# Patient Record
Sex: FEMALE | Race: ASIAN | NOT HISPANIC OR LATINO | URBAN - METROPOLITAN AREA
[De-identification: names, ages, dates, MRNs, and addresses within clinical notes are randomized per-mention and may not be internally consistent; named-entity substitution may affect disease eponyms.]

---

## 2017-09-25 ENCOUNTER — INPATIENT (INPATIENT)
Facility: HOSPITAL | Age: 47
LOS: 7 days | Discharge: HOME | End: 2017-10-03
Attending: HOSPITALIST | Admitting: HOSPITALIST
Payer: MEDICAID

## 2017-09-25 VITALS
HEART RATE: 98 BPM | TEMPERATURE: 98 F | RESPIRATION RATE: 16 BRPM | DIASTOLIC BLOOD PRESSURE: 70 MMHG | OXYGEN SATURATION: 100 % | SYSTOLIC BLOOD PRESSURE: 142 MMHG

## 2017-09-25 NOTE — ED ADULT NURSE NOTE - OBJECTIVE STATEMENT
(facilitating) Pt rec'd to R#26 Aox4, in NAD, sent in by PMD for elevated calcium, BUN, creat levels in BW. Pt c/o B/L lower extremity pain x3 days, denies fall/ recent injury to legs. States it is difficult to ambulate d/t pain. Also c/o nausea last night, subjective fevers x2.5 weeks, lower back pain (states she has known B/L kidney stones), intermittent abdominal pain. Denies CP/ SOB/ cough/ urinary symptoms/ weakness/ dizziness/ other acute complaints. VSS> IV inserted, BW collected and sent to lab. Awaiting MD [place]. Family at bedside. Report endorsed to Isaura MORATAYA.

## 2017-09-25 NOTE — ED ADULT TRIAGE NOTE - CHIEF COMPLAINT QUOTE
pt sent in by PMD for elevated Cal[place] and kidney levels. pt also c/o abdominal pain, nausea, dizziness.

## 2017-09-26 DIAGNOSIS — E11.9 TYPE 2 DIABETES MELLITUS WITHOUT COMPLICATIONS: ICD-10-CM

## 2017-09-26 DIAGNOSIS — Z29.9 ENCOUNTER FOR PROPHYLACTIC MEASURES, UNSPECIFIED: ICD-10-CM

## 2017-09-26 DIAGNOSIS — D64.9 ANEMIA, UNSPECIFIED: ICD-10-CM

## 2017-09-26 DIAGNOSIS — E87.5 HYPERKALEMIA: ICD-10-CM

## 2017-09-26 DIAGNOSIS — D86.9 SARCOIDOSIS, UNSPECIFIED: ICD-10-CM

## 2017-09-26 DIAGNOSIS — E83.52 HYPERCALCEMIA: ICD-10-CM

## 2017-09-26 DIAGNOSIS — D72.1 EOSINOPHILIA: ICD-10-CM

## 2017-09-26 DIAGNOSIS — N17.9 ACUTE KIDNEY FAILURE, UNSPECIFIED: ICD-10-CM

## 2017-09-26 LAB
24R-OH-CALCIDIOL SERPL-MCNC: 12.3 NG/ML — LOW (ref 30–80)
ALBUMIN SERPL ELPH-MCNC: 3.5 G/DL — SIGNIFICANT CHANGE UP (ref 3.3–5)
ALBUMIN SERPL ELPH-MCNC: 4.1 G/DL — SIGNIFICANT CHANGE UP (ref 3.3–5)
ALP SERPL-CCNC: 47 U/L — SIGNIFICANT CHANGE UP (ref 40–120)
ALP SERPL-CCNC: 47 U/L — SIGNIFICANT CHANGE UP (ref 40–120)
ALT FLD-CCNC: 4 U/L — SIGNIFICANT CHANGE UP (ref 4–33)
ALT FLD-CCNC: 4 U/L — SIGNIFICANT CHANGE UP (ref 4–33)
APPEARANCE UR: CLEAR — SIGNIFICANT CHANGE UP
AST SERPL-CCNC: 11 U/L — SIGNIFICANT CHANGE UP (ref 4–32)
AST SERPL-CCNC: 13 U/L — SIGNIFICANT CHANGE UP (ref 4–32)
BASOPHILS # BLD AUTO: 0.02 K/UL — SIGNIFICANT CHANGE UP (ref 0–0.2)
BASOPHILS # BLD AUTO: 0.02 K/UL — SIGNIFICANT CHANGE UP (ref 0–0.2)
BASOPHILS NFR BLD AUTO: 0.4 % — SIGNIFICANT CHANGE UP (ref 0–2)
BASOPHILS NFR BLD AUTO: 0.5 % — SIGNIFICANT CHANGE UP (ref 0–2)
BILIRUB SERPL-MCNC: 0.2 MG/DL — SIGNIFICANT CHANGE UP (ref 0.2–1.2)
BILIRUB SERPL-MCNC: 0.3 MG/DL — SIGNIFICANT CHANGE UP (ref 0.2–1.2)
BILIRUB UR-MCNC: NEGATIVE — SIGNIFICANT CHANGE UP
BLOOD UR QL VISUAL: NEGATIVE — SIGNIFICANT CHANGE UP
BUN SERPL-MCNC: 73 MG/DL — HIGH (ref 7–23)
BUN SERPL-MCNC: 77 MG/DL — HIGH (ref 7–23)
CALCIUM SERPL-MCNC: 12.8 MG/DL — HIGH (ref 8.4–10.5)
CALCIUM SERPL-MCNC: 15.3 MG/DL — CRITICAL HIGH (ref 8.4–10.5)
CHLORIDE SERPL-SCNC: 94 MMOL/L — LOW (ref 98–107)
CHLORIDE SERPL-SCNC: 99 MMOL/L — SIGNIFICANT CHANGE UP (ref 98–107)
CO2 SERPL-SCNC: 20 MMOL/L — LOW (ref 22–31)
CO2 SERPL-SCNC: 22 MMOL/L — SIGNIFICANT CHANGE UP (ref 22–31)
COLOR SPEC: SIGNIFICANT CHANGE UP
CREAT ?TM UR-MCNC: 35.32 MG/DL — SIGNIFICANT CHANGE UP
CREAT SERPL-MCNC: 5.21 MG/DL — HIGH (ref 0.5–1.3)
CREAT SERPL-MCNC: 5.65 MG/DL — HIGH (ref 0.5–1.3)
EOSINOPHIL # BLD AUTO: 0.5 K/UL — SIGNIFICANT CHANGE UP (ref 0–0.5)
EOSINOPHIL # BLD AUTO: 0.64 K/UL — HIGH (ref 0–0.5)
EOSINOPHIL NFR BLD AUTO: 11.5 % — HIGH (ref 0–6)
EOSINOPHIL NFR BLD AUTO: 12.2 % — HIGH (ref 0–6)
GLUCOSE SERPL-MCNC: 133 MG/DL — HIGH (ref 70–99)
GLUCOSE SERPL-MCNC: 86 MG/DL — SIGNIFICANT CHANGE UP (ref 70–99)
GLUCOSE UR-MCNC: 50 — SIGNIFICANT CHANGE UP
HBA1C BLD-MCNC: 6.9 % — HIGH (ref 4–5.6)
HCG UR-SCNC: NEGATIVE — SIGNIFICANT CHANGE UP
HCT VFR BLD CALC: 26.5 % — LOW (ref 34.5–45)
HCT VFR BLD CALC: 29.5 % — LOW (ref 34.5–45)
HGB BLD-MCNC: 8.6 G/DL — LOW (ref 11.5–15.5)
HGB BLD-MCNC: 9.7 G/DL — LOW (ref 11.5–15.5)
IMM GRANULOCYTES # BLD AUTO: 0.02 # — SIGNIFICANT CHANGE UP
IMM GRANULOCYTES # BLD AUTO: 0.03 # — SIGNIFICANT CHANGE UP
IMM GRANULOCYTES NFR BLD AUTO: 0.4 % — SIGNIFICANT CHANGE UP (ref 0–1.5)
IMM GRANULOCYTES NFR BLD AUTO: 0.7 % — SIGNIFICANT CHANGE UP (ref 0–1.5)
KETONES UR-MCNC: NEGATIVE — SIGNIFICANT CHANGE UP
LEUKOCYTE ESTERASE UR-ACNC: NEGATIVE — SIGNIFICANT CHANGE UP
LYMPHOCYTES # BLD AUTO: 0.69 K/UL — LOW (ref 1–3.3)
LYMPHOCYTES # BLD AUTO: 1.03 K/UL — SIGNIFICANT CHANGE UP (ref 1–3.3)
LYMPHOCYTES # BLD AUTO: 15.9 % — SIGNIFICANT CHANGE UP (ref 13–44)
LYMPHOCYTES # BLD AUTO: 19.6 % — SIGNIFICANT CHANGE UP (ref 13–44)
MCHC RBC-ENTMCNC: 25.4 PG — LOW (ref 27–34)
MCHC RBC-ENTMCNC: 26.1 PG — LOW (ref 27–34)
MCHC RBC-ENTMCNC: 32.5 % — SIGNIFICANT CHANGE UP (ref 32–36)
MCHC RBC-ENTMCNC: 32.9 % — SIGNIFICANT CHANGE UP (ref 32–36)
MCV RBC AUTO: 78.4 FL — LOW (ref 80–100)
MCV RBC AUTO: 79.3 FL — LOW (ref 80–100)
MONOCYTES # BLD AUTO: 0.55 K/UL — SIGNIFICANT CHANGE UP (ref 0–0.9)
MONOCYTES # BLD AUTO: 0.6 K/UL — SIGNIFICANT CHANGE UP (ref 0–0.9)
MONOCYTES NFR BLD AUTO: 11.4 % — SIGNIFICANT CHANGE UP (ref 2–14)
MONOCYTES NFR BLD AUTO: 12.7 % — SIGNIFICANT CHANGE UP (ref 2–14)
MUCOUS THREADS # UR AUTO: SIGNIFICANT CHANGE UP
NEUTROPHILS # BLD AUTO: 2.54 K/UL — SIGNIFICANT CHANGE UP (ref 1.8–7.4)
NEUTROPHILS # BLD AUTO: 2.94 K/UL — SIGNIFICANT CHANGE UP (ref 1.8–7.4)
NEUTROPHILS NFR BLD AUTO: 56 % — SIGNIFICANT CHANGE UP (ref 43–77)
NEUTROPHILS NFR BLD AUTO: 58.7 % — SIGNIFICANT CHANGE UP (ref 43–77)
NITRITE UR-MCNC: NEGATIVE — SIGNIFICANT CHANGE UP
NRBC # FLD: 0 — SIGNIFICANT CHANGE UP
NRBC # FLD: 0 — SIGNIFICANT CHANGE UP
PH UR: 6.5 — SIGNIFICANT CHANGE UP (ref 4.6–8)
PLATELET # BLD AUTO: 195 K/UL — SIGNIFICANT CHANGE UP (ref 150–400)
PLATELET # BLD AUTO: 216 K/UL — SIGNIFICANT CHANGE UP (ref 150–400)
PMV BLD: 8.9 FL — SIGNIFICANT CHANGE UP (ref 7–13)
PMV BLD: 9.3 FL — SIGNIFICANT CHANGE UP (ref 7–13)
POTASSIUM SERPL-MCNC: 5.2 MMOL/L — SIGNIFICANT CHANGE UP (ref 3.5–5.3)
POTASSIUM SERPL-MCNC: 5.5 MMOL/L — HIGH (ref 3.5–5.3)
POTASSIUM SERPL-SCNC: 5.2 MMOL/L — SIGNIFICANT CHANGE UP (ref 3.5–5.3)
POTASSIUM SERPL-SCNC: 5.5 MMOL/L — HIGH (ref 3.5–5.3)
PROT SERPL-MCNC: 6.9 G/DL — SIGNIFICANT CHANGE UP (ref 6–8.3)
PROT SERPL-MCNC: 7.3 G/DL — SIGNIFICANT CHANGE UP (ref 6–8.3)
PROT SERPL-MCNC: 8.5 G/DL — HIGH (ref 6–8.3)
PROT UR-MCNC: 22.7 MG/DL — SIGNIFICANT CHANGE UP
PROT UR-MCNC: 30 — HIGH
PTH-INTACT SERPL-MCNC: 8.12 PG/ML — LOW (ref 15–65)
RBC # BLD: 3.38 M/UL — LOW (ref 3.8–5.2)
RBC # BLD: 3.72 M/UL — LOW (ref 3.8–5.2)
RBC # FLD: 12.6 % — SIGNIFICANT CHANGE UP (ref 10.3–14.5)
RBC # FLD: 12.7 % — SIGNIFICANT CHANGE UP (ref 10.3–14.5)
RBC CASTS # UR COMP ASSIST: SIGNIFICANT CHANGE UP (ref 0–?)
SODIUM SERPL-SCNC: 135 MMOL/L — SIGNIFICANT CHANGE UP (ref 135–145)
SODIUM SERPL-SCNC: 137 MMOL/L — SIGNIFICANT CHANGE UP (ref 135–145)
SODIUM UR-SCNC: 102 MEQ/L — SIGNIFICANT CHANGE UP
SP GR SPEC: 1.01 — SIGNIFICANT CHANGE UP (ref 1–1.03)
SP GR UR: 1 — LOW (ref 1–1.03)
SQUAMOUS # UR AUTO: SIGNIFICANT CHANGE UP
UROBILINOGEN FLD QL: NORMAL E.U. — SIGNIFICANT CHANGE UP (ref 0.1–0.2)
VIT D25+D1,25 OH+D1,25 PNL SERPL-MCNC: 501 PG/ML — HIGH (ref 19.9–79.3)
WBC # BLD: 4.33 K/UL — SIGNIFICANT CHANGE UP (ref 3.8–10.5)
WBC # BLD: 5.25 K/UL — SIGNIFICANT CHANGE UP (ref 3.8–10.5)
WBC # FLD AUTO: 4.33 K/UL — SIGNIFICANT CHANGE UP (ref 3.8–10.5)
WBC # FLD AUTO: 5.25 K/UL — SIGNIFICANT CHANGE UP (ref 3.8–10.5)
WBC UR QL: SIGNIFICANT CHANGE UP (ref 0–?)

## 2017-09-26 PROCEDURE — 71250 CT THORAX DX C-: CPT | Mod: 26

## 2017-09-26 PROCEDURE — 99223 1ST HOSP IP/OBS HIGH 75: CPT | Mod: GC

## 2017-09-26 PROCEDURE — 84165 PROTEIN E-PHORESIS SERUM: CPT | Mod: 26

## 2017-09-26 PROCEDURE — 99223 1ST HOSP IP/OBS HIGH 75: CPT | Mod: AI,GC

## 2017-09-26 PROCEDURE — 84166 PROTEIN E-PHORESIS/URINE/CSF: CPT | Mod: 26

## 2017-09-26 PROCEDURE — 76775 US EXAM ABDO BACK WALL LIM: CPT | Mod: 26

## 2017-09-26 PROCEDURE — 86334 IMMUNOFIX E-PHORESIS SERUM: CPT | Mod: 26

## 2017-09-26 PROCEDURE — 86335 IMMUNFIX E-PHORSIS/URINE/CSF: CPT | Mod: 26

## 2017-09-26 RX ORDER — INFLUENZA VIRUS VACCINE 15; 15; 15; 15 UG/.5ML; UG/.5ML; UG/.5ML; UG/.5ML
0.5 SUSPENSION INTRAMUSCULAR ONCE
Qty: 0 | Refills: 0 | Status: DISCONTINUED | OUTPATIENT
Start: 2017-09-26 | End: 2017-10-03

## 2017-09-26 RX ORDER — DEXTROSE 50 % IN WATER 50 %
12.5 SYRINGE (ML) INTRAVENOUS ONCE
Qty: 0 | Refills: 0 | Status: DISCONTINUED | OUTPATIENT
Start: 2017-09-26 | End: 2017-10-03

## 2017-09-26 RX ORDER — SODIUM CHLORIDE 9 MG/ML
1000 INJECTION, SOLUTION INTRAVENOUS
Qty: 0 | Refills: 0 | Status: DISCONTINUED | OUTPATIENT
Start: 2017-09-26 | End: 2017-10-03

## 2017-09-26 RX ORDER — SODIUM POLYSTYRENE SULFONATE 4.1 MEQ/G
30 POWDER, FOR SUSPENSION ORAL ONCE
Qty: 0 | Refills: 0 | Status: DISCONTINUED | OUTPATIENT
Start: 2017-09-26 | End: 2017-09-26

## 2017-09-26 RX ORDER — SODIUM CHLORIDE 9 MG/ML
1000 INJECTION INTRAMUSCULAR; INTRAVENOUS; SUBCUTANEOUS
Qty: 0 | Refills: 0 | Status: DISCONTINUED | OUTPATIENT
Start: 2017-09-26 | End: 2017-09-29

## 2017-09-26 RX ORDER — DEXTROSE 50 % IN WATER 50 %
1 SYRINGE (ML) INTRAVENOUS ONCE
Qty: 0 | Refills: 0 | Status: DISCONTINUED | OUTPATIENT
Start: 2017-09-26 | End: 2017-10-03

## 2017-09-26 RX ORDER — OXYCODONE AND ACETAMINOPHEN 5; 325 MG/1; MG/1
1 TABLET ORAL EVERY 6 HOURS
Qty: 0 | Refills: 0 | Status: DISCONTINUED | OUTPATIENT
Start: 2017-09-26 | End: 2017-10-03

## 2017-09-26 RX ORDER — INSULIN LISPRO 100/ML
VIAL (ML) SUBCUTANEOUS AT BEDTIME
Qty: 0 | Refills: 0 | Status: DISCONTINUED | OUTPATIENT
Start: 2017-09-26 | End: 2017-10-03

## 2017-09-26 RX ORDER — SODIUM POLYSTYRENE SULFONATE 4.1 MEQ/G
15 POWDER, FOR SUSPENSION ORAL ONCE
Qty: 0 | Refills: 0 | Status: COMPLETED | OUTPATIENT
Start: 2017-09-26 | End: 2017-09-26

## 2017-09-26 RX ORDER — INSULIN LISPRO 100/ML
VIAL (ML) SUBCUTANEOUS
Qty: 0 | Refills: 0 | Status: DISCONTINUED | OUTPATIENT
Start: 2017-09-26 | End: 2017-10-03

## 2017-09-26 RX ORDER — DEXTROSE 50 % IN WATER 50 %
25 SYRINGE (ML) INTRAVENOUS ONCE
Qty: 0 | Refills: 0 | Status: DISCONTINUED | OUTPATIENT
Start: 2017-09-26 | End: 2017-10-03

## 2017-09-26 RX ORDER — SODIUM CHLORIDE 9 MG/ML
2000 INJECTION INTRAMUSCULAR; INTRAVENOUS; SUBCUTANEOUS ONCE
Qty: 0 | Refills: 0 | Status: COMPLETED | OUTPATIENT
Start: 2017-09-26 | End: 2017-09-26

## 2017-09-26 RX ORDER — ACETAMINOPHEN 500 MG
1000 TABLET ORAL ONCE
Qty: 0 | Refills: 0 | Status: COMPLETED | OUTPATIENT
Start: 2017-09-26 | End: 2017-09-26

## 2017-09-26 RX ORDER — GLUCAGON INJECTION, SOLUTION 0.5 MG/.1ML
1 INJECTION, SOLUTION SUBCUTANEOUS ONCE
Qty: 0 | Refills: 0 | Status: DISCONTINUED | OUTPATIENT
Start: 2017-09-26 | End: 2017-10-03

## 2017-09-26 RX ADMIN — SODIUM CHLORIDE 2000 MILLILITER(S): 9 INJECTION INTRAMUSCULAR; INTRAVENOUS; SUBCUTANEOUS at 00:44

## 2017-09-26 RX ADMIN — SODIUM POLYSTYRENE SULFONATE 15 GRAM(S): 4.1 POWDER, FOR SUSPENSION ORAL at 16:29

## 2017-09-26 RX ADMIN — Medication 1000 MILLIGRAM(S): at 01:05

## 2017-09-26 RX ADMIN — Medication 400 MILLIGRAM(S): at 00:50

## 2017-09-26 RX ADMIN — SODIUM CHLORIDE 200 MILLILITER(S): 9 INJECTION INTRAMUSCULAR; INTRAVENOUS; SUBCUTANEOUS at 08:30

## 2017-09-26 RX ADMIN — OXYCODONE AND ACETAMINOPHEN 1 TABLET(S): 5; 325 TABLET ORAL at 17:40

## 2017-09-26 RX ADMIN — OXYCODONE AND ACETAMINOPHEN 1 TABLET(S): 5; 325 TABLET ORAL at 18:30

## 2017-09-26 NOTE — CONSULT NOTE ADULT - ASSESSMENT
46 y/o female with history of DM, sarcoidosis ~2010 ( been on and off prednisone) presented to [facility] with compliant of weakness, back pain and LE pain found to have Jose and hypercalcemia. 47-year-old female with known history of sarcoidosis, DM presented to [facility] with complaints of weakness, back pain and LE pain. Pt. found to have MIKALA and hypercalcemia.

## 2017-09-26 NOTE — H&P ADULT - NSHPLABSRESULTS_GEN_ALL_CORE
Normocytic anemia,Hypercalcemia,MIKALA,hyperkalemic,hyperglycemia    EKG:NSR,Normal axis,no acute ST or T wave changes. Normocytic anemia, Hypercalcemia, MIKALA, Hyperkalemic, Hyperglycemia    EKG: NSR, Normal axis, no acute ST or T wave changes.

## 2017-09-26 NOTE — H&P ADULT - PROBLEM SELECTOR PLAN 2
differential includes Jose 2/2 malignancy(myeloma) vs prerenal vs intrinsic renal   -Renal Sono  -SPEP/UPEP,Immunofixation  -Strict I/OS  -Nephrology consult   -Hold nephrotixins differential includes Jose 2/2 malignancy(myeloma) vs prerenal 2/2 hypercalcemia vs NSAID induced nephropathy  -Renal Sono  -SPEP/UPEP,Immunofixation,FLC  -Strict I/OS  -Nephrology consult   -Hold nephrotixins

## 2017-09-26 NOTE — ED PROVIDER NOTE - NS ED ROS FT
REVIEW OF SYSTEMS:    CONSTITUTIONAL: No weakness, fevers or chills  EYES/ENT: No visual changes;  No vertigo or throat pain   NECK: No pain or stiffness  RESPIRATORY: No cough, wheezing, hemoptysis; No shortness of breath  CARDIOVASCULAR: No chest pain or palpitations  GASTROINTESTINAL: No abdominal or epigastric pain. No nausea, vomiting, or hematemesis; No diarrhea or constipation. No melena or hematochezia.  GENITOURINARY: Urinary burning.  NEUROLOGICAL: bilateral LE pain  SKIN: No itching, burning, rashes, or lesions   All other review of systems is negative unless indicated above.

## 2017-09-26 NOTE — ED PROVIDER NOTE - OBJECTIVE STATEMENT
46yo F w/ PMHx of sarcoidosis present to ER from pulmonologist' s office after found to have MIKALA and hyperCa level. Patient reported feeling urinary burning s/p outpatient abx treatment as well as bilateral LE pain from feet up to knee level. Patient was found to have elevated Ca up to 15 and MIKALA cr 5.5 (baseline 1.6 Cr). 48yo F w/ PMHx of sarcoidosis present to ER from pulmonologist' s office after found to have MIKALA and hyperCa level. Patient reported feeling urinary burning s/p outpatient abx treatment as well as bilateral LE pain from feet up to knee level. Patient was found to have elevated Ca up to 15 and MIKALA cr 5.5 (baseline 1.6 Cr).  Kle[place]fish: 47F PMH sarcoid, DM p/w abnormal labs. Pt had routine labs drawn 3d ago, called yesterday for high ca and high cr. Pt c/o b/l LE pain, diffuse, minimal lower back pain. Denies SOB/CP, f/c, palpitations, lightheaded, NVD, weakness/numbness, abd pain, urinary complaints. PMD [facility], has no nephrologist. Baseline cr ~1.6 per pt.

## 2017-09-26 NOTE — H&P ADULT - PROBLEM SELECTOR PLAN 3
Normcytic/microcytic anemia. Differntial includes iron deficiency vs anemia of chronic disease. Low suspicion for hemolysis given normal Bilirubin and unchanged Hg level from prior examinations   -Check iron studies  -Monitor CBC daily

## 2017-09-26 NOTE — ED PROVIDER NOTE - MEDICAL DECISION MAKING DETAILS
47F w/ sarcoidosis present to ER from pulmonologist' s office for MIKALA and hypercalcemia. Started on 2L bolus in the ER. Will trend BMP.

## 2017-09-26 NOTE — CONSULT NOTE ADULT - PROBLEM SELECTOR RECOMMENDATION 9
PT with MIKALA likely hemodynamically mediated in setting of hypercalcemia and NSAID use. Pt with Scr 1.7 in August 2017. Pt with elevated Scr at 5.6 on 9/26 with improvement to 5.2 with IVF. No documentation of urine output , however patient reports she is voiding well.  Advise to continue IVF. Advise to check renal sonogram. Monitor BMP, strict I/O, avoid nephrotoxcis, NSAIDS, RCA Pt. with most likely hemodynamically mediated MIKALA in setting of hypercalcemia and recent NSAID use. Scr elevated at 5.6 on initial labs in ER today. Scr decreased to 5.2 with IVF. Patient reports voiding well. Continue with IVF as tolerated. Check renal sonogram. Monitor labs and urine output. Avoid nephrotoxins, NSAIDs, and RCA

## 2017-09-26 NOTE — ED PROVIDER NOTE - ATTENDING CONTRIBUTION TO CARE
47F PMH sarcoid, DM p/w high ca and increasing cr, c/o b/l LE pain x3-4d, minimal LBP. No neuro or cardiovascular complaints. Vitals wnl, exam as above.  ddx: Possibly 2/2 sarcoid or DM vs. underlying malignancy,  CBC, cmp, IVF, EKG, reassess.

## 2017-09-26 NOTE — H&P ADULT - PROBLEM SELECTOR PLAN 5
Possible 2/2 sarcoid vs vasculitis    - Possible 2/2 sarcoid vs vasculitis    -Will check ANCAs  -Repeat CBC with diff

## 2017-09-26 NOTE — CONSULT NOTE ADULT - ASSESSMENT
47yoF hx of sarcoidosis (dx 2010 via biopsy showing noncaseating granulomas not on any treatment), T2DM, sent in from outside rheumatologist for abnormal labs showing MIKALA and hypercalcemia. Pt on admission has Ca+ 15.3, creatinine 5.65. Rheum consulted for further evaluation    1) Hypercalcemia- differential includes sarcoidosis vs. malignancy such as MM. Pt has diagnosed sarcoid not on any treatment  HyperCa+ can be due to excess Vit D production by granulomas. However, she should also undergo w/u to rule out multiple myeloma  - Check Vit D 25-OH, 1-25OH, ACE level  - SPEP, UPEP  - f/u CT Chest   - Check Immunoglobulins, HIV, Quant Gold, ESR CRP    2) MIKALA- MIKALA may be 2/2 hypercalcemia or AIN 2/2 Aleve use. Pt also notes recent Abx use for UTI treatment  - Would check UA and Urine culture  - c/w IVF  - If no improvement, will consider steroids  - f/u renal sono    3) Anemia- pt has microcytic anemia, likely iron deficiency  - Check iron studies including LDH Hapto Retic    will follow 47yoF hx of sarcoidosis (dx 2010 via biopsy showing noncaseating granulomas not on any treatment), T2DM, sent in from outside rheumatologist for abnormal labs showing MIKALA and hypercalcemia. Pt on admission has Ca+ 15.3, creatinine 5.65. Rheum consulted for further evaluation    1) Hypercalcemia- differential includes sarcoidosis vs. malignancy such as MM/lymphoma. Pt has diagnosed sarcoid not on any treatment  HyperCa+ can be due to excess Vit D production by granulomas. However, she should also undergo w/u to rule out multiple myeloma  - Check Vit D 25-OH, 1-25OH, ACE level  - SPEP, UPEP  - f/u CT Chest   - Check Immunoglobulins, HIV, Quant Gold, ESR CRP    2) MIKALA- MIKALA may be 2/2 hypercalcemia or AIN 2/2 Aleve use. Pt also notes recent Abx use for UTI treatment  - Would check UA and Urine culture  - c/w IVF  - If no improvement, will consider steroids  - f/u renal sono    3) Anemia- pt has microcytic anemia, likely iron deficiency  - Check iron studies including LDH Hapto Retic    will follow 47yoF hx of sarcoidosis (dx 2010 via biopsy showing noncaseating granulomas not on any treatment), T2DM, sent in from outside rheumatologist for abnormal labs showing MIKALA and hypercalcemia. Pt on admission has Ca+ 15.3, creatinine 5.65. Rheum consulted for further evaluation    1) Hypercalcemia- differential includes sarcoidosis vs. malignancy such as MM/lymphoma. Pt has diagnosed sarcoid not on any treatment  HyperCa+ can be due to excess Vit D production by granulomas. However, she should also undergo w/u to rule out multiple myeloma  - Check Vit D 25-OH, 1-25OH, ACE level  - SPEP, UPEP  - f/u CT Chest   - Check Immunoglobulins, HIV, Quant Gold, ESR CRP    2) MIKALA- MIKALA may be 2/2 hypercalcemia or AIN 2/2 NSAID use. Pt also notes recent Abx use for UTI treatment  - Would check UA and Urine culture  - c/w IVF  - If no improvement, will consider steroids  - f/u renal sono    3) Anemia- pt has microcytic anemia, likely iron deficiency  - Check iron studies including LDH Hapto Retic  4) Progressive ENGLISH - concern for pulm vs CARDIAC sarcoid vs possible pericardial effusion 2/2 renal failure/uremia  will follow     will follow

## 2017-09-26 NOTE — H&P ADULT - ASSESSMENT
47 y.o F with pmh sarcoidosis (untreated for past 3 years),DMII and nephrolithiasis p/w abnormal labs from pulmonologists office. Pt found 47 y.o F with pmh sarcoidosis (untreated for past 3 years), DMII and nephrolithiasis, p/w high Ca and increasing Cr from pulmonologists office, c/o BLE pain x3-4d, LBP, dizziness and nausea. Vitals wnL, no cardiac complaints. Exam as above.

## 2017-09-26 NOTE — H&P ADULT - PROBLEM SELECTOR PLAN 6
-Pulm consult   -Obtain home meds   -Talk to outpatient PCP Pt has been off treatment for over 3 years because she has not followed up with her PCP.   -Rheum consult   -Obtain home meds   -Talk to outpatient PCP

## 2017-09-26 NOTE — CONSULT NOTE ADULT - PROBLEM SELECTOR RECOMMENDATION 3
Pt with hypercalcemia in setting of sarcoidosis. Pt with serum calcium 10.3 in August 2017. Pt with serum calcium 15.3 today. Pt was started on IVF with improvement in serum calcium to 12.8. Advise to continue IVF. Monitor serum calcium closely Pt. with hypercalcemia in setting of sarcoidosis. Serum calcium decreased to 12.8 with IVF. Continue IVF. Monitor serum calcium

## 2017-09-26 NOTE — CONSULT NOTE ADULT - SUBJECTIVE AND OBJECTIVE BOX
LEONEL SIRIA  [id number]    HISTORY OF PRESENT ILLNESS:  47yoF hx of sarcoidosis (dx 2010 via transbronchial bx), T2DM sent in from outside rheumatologist Dr. Carmichael for hypercalcemia and MIKALA.  Pt accompanied by daughter at bedside.  States that she has not felt like herself for almost 2 years but more recently has felt more fatigue, worsening ENGLISH, and weight loss. Has lost about 25 lbs in the last month. Gets short of breath even when walking about 1 block. Has noticed rash on her arms and has had hair loss, muscle weakness, and leg pains. +dry mouth x 3 days.   Says that she only takes Metformin and Aleve at home but over the weekend, she had taken about 8 Aleve/day for relief of her joint pains. Normally takes about 2 Aleve/day for pains   Also reports that she recently had a UTI and was treated with ciprofloxacin.    Says she was dx with sarcoidosis in 2010 after she had reported cough and imaging showed abnormalities in her lungs. Transbronchial bx of RLL showed noncaseating granulomas. She believes she was started on Prednisone but then stopped because she was feeling well.  Had previously seen a rheumatologist Dr. Sky but had not followed up. She was recently found to have an elevated ESR at her PMD's office and was referred to see Dr. Carmichael who did labs on her yesterday 9/25. He noted that her calcium had returned >15 and her creatinine was 3.7 (labs in 8/2017 showed creatinine 1.67, calcium 10.3) so she was advised to come to ED.     In ED, pt noted to have creatinine of 5.65, calcium of 15.3. She was started on NS 2L and admitted to Medicine.    PAST MEDICAL & SURGICAL HISTORY:  Diabetes  Sarcoidosis  No significant past surgical history      Review of Systems:  Gen: +fatigue +weight loss  HEENT: No blurry vision, no difficulty swallowing. No painful/red eyes +dry mouth  CVS: No chest pain/palpitations  Resp: +SOB +ENGLISH  GI: No N/V/C/D/abdominal pain  MSK:  +joint pains  Skin: +rashes  Neuro: No headaches    MEDICATIONS  (STANDING):  sodium chloride 0.9%. 1000 milliLiter(s) (200 mL/Hr) IV Continuous <Continuous>  insulin lispro (HumaLOG) corrective regimen sliding scale   SubCutaneous three times a day before meals  insulin lispro (HumaLOG) corrective regimen sliding scale   SubCutaneous at bedtime  dextrose 5%. 1000 milliLiter(s) (50 mL/Hr) IV Continuous <Continuous>  dextrose 50% Injectable 12.5 Gram(s) IV Push once  dextrose 50% Injectable 25 Gram(s) IV Push once  dextrose 50% Injectable 25 Gram(s) IV Push once  sodium polystyrene sulfonate Suspension 15 Gram(s) Oral once    MEDICATIONS  (PRN):  dextrose Gel 1 Dose(s) Oral once PRN Blood Glucose LESS THAN 70 milliGRAM(s)/deciliter  glucagon  Injectable 1 milliGRAM(s) IntraMuscular once PRN Glucose LESS THAN 70 milligrams/deciliter      Allergies    No Known Allergies    Intolerances        PERTINENT MEDICATION HISTORY:    SOCIAL HISTORY: Originally from [facility], lives with family. No toxic habits  OCCUPATION:  at [employer].       FAMILY HISTORY:  Family history of type C viral hepatitis (Mother): Mother      Vital Signs Last 24 Hrs  T(C): 37.1 (26 Sep 2017 13:16), Max: 37.1 (26 Sep 2017 08:30)  T(F): 98.7 (26 Sep 2017 13:16), Max: 98.7 (26 Sep 2017 08:30)  HR: 88 (26 Sep 2017 14:53) (80 - 98)  BP: 132/70 (26 Sep 2017 14:53) (130/70 - 160/82)  BP(mean): --  RR: 20 (26 Sep 2017 14:53) (16 - 20)  SpO2: 100% (26 Sep 2017 14:53) (100% - 100%)    Daily     Daily     Physical Exam:  General: No apparent distress  HEENT: EOMI, MMM. Impressive palpable preauricular LAD as well as anterior/posterior cervical adenopathy and submandibular and supraclavicular LAD  CVS: +S1/S2, RRR, no murmurs/rubs/gallops  Resp: CTA b/l. No crackles/wheezing  GI: Soft, NT/ND +BS  MSK:  Shoulders: wnl  Elbows: wnl  Wrists: wnl  MCPs: wnl  PIPs: wnl  DIPs: wnl   Hips: wnl  Knees: wnl   Ankle: wnl  Neuro: AAOx3  Skin: palpable erythematous nodule appreciated on RUE. Pt has postinflammatory hyperpigmentation noted on RLE from previous dimitry.     LABS:                        8.6    4.33  )-----------( 195      ( 26 Sep 2017 09:09 )             26.5     09-26    137  |  99  |  73<H>  ----------------------------<  86  5.5<H>   |  20<L>  |  5.21<H>    Ca    12.8<H>      26 Sep 2017 07:35    TPro  7.3  /  Alb  x   /  TBili  x   /  DBili  x   /  AST  x   /  ALT  x   /  AlkPhos  x   09-26          RADIOLOGY & ADDITIONAL STUDIES:

## 2017-09-26 NOTE — H&P ADULT - RS GEN PE MLT RESP DETAILS PC
good air movement/clear to auscultation bilaterally/normal/airway patent/breath sounds equal/respirations non-labored/no wheezes

## 2017-09-26 NOTE — H&P ADULT - NSHPSOCIALHISTORY_GEN_ALL_CORE
Non smoker,non drinker,no recreational drugs. Pt reports having 6 kids and works at [employer] Non smoker,non drinker,no recreational drugs. Pt is single. She lives at home with her 6 children and mother. She reports working at [employer]. Non-smoker, non-drinker, no recreational drugs. Pt is single. She lives at home with her 6 children and mother. She reports working at [employer].

## 2017-09-26 NOTE — CONSULT NOTE ADULT - PROBLEM SELECTOR RECOMMENDATION 2
PT with hyperkalemia in setting of renal failure. Pt with elevated serum potassium at 5.5 today. Advise one dose of Kayexalate. Advise to repeat serum potassium. Monitor serum potassium closely.  Low potassium diet Pt. with mild hyperkalemia in setting of renal failure. Serum potassium elevated at 5.5 today. Recommend one dose of Kayexalate. Repeat serum potassium today. Low potassium diet. Monitor serum potassium

## 2017-09-26 NOTE — H&P ADULT - FAMILY HISTORY
Mother  Still living? Yes, Estimated age: Age Unknown  Family history of type C viral hepatitis, Age at diagnosis: Age Unknown

## 2017-09-26 NOTE — ED PROVIDER NOTE - PHYSICAL EXAMINATION
T(C): 36.9 (09-25-17 @ 21:44), Max: 36.9 (09-25-17 @ 21:44)  HR: 92 (09-25-17 @ 23:40) (92 - 98)  BP: 160/82 (09-25-17 @ 23:40) (142/70 - 160/82)  RR: 16 (09-25-17 @ 23:40) (16 - 16)  SpO2: 100% (09-25-17 @ 23:40) (100% - 100%)  Wt(kg): --  GENERAL: NAD, well-developed  HEAD:  Atraumatic, Normocephalic  EYES: EOMI, PERRLA, conjunctiva and sclera clear  NECK: Supple, No JVD  CHEST/LUNG: Clear to auscultation bilaterally; No wheeze  HEART: Regular rate and rhythm; No murmurs, rubs, or gallops  ABDOMEN: Soft, Nontender, Nondistended; Bowel sounds present  EXTREMITIES:  2+ Peripheral Pulses, No clubbing, cyanosis, or edema  PSYCH: AAOx3  NEUROLOGY: non-focal  SKIN: No rashes or lesions no LE edema, normal equal distal pulses.   no calf ttp, strength 5/5

## 2017-09-26 NOTE — H&P ADULT - HISTORY OF PRESENT ILLNESS
47 y.o F with pmh DMII,Nephrolithiasis, Sarcoidosis presenting from pulmonologist office with abnormal labs. PT said that she went to visit her pulmonlogist 47 y.o F with pmh DMII,Nephrolithiasis, Sarcoidosis presenting from pulmonologist office with abnormal labs. PT said that she went to visit a new pulmonologist to establish care. She reports that for the past 3 months she has been having fevers/night sweats,20 pounds weight loss, fatigue, 47 y.o F with pmh DMII,Nephrolithiasis, Sarcoidosis presenting from pulmonologist office with abnormal labs. PT said that she went to visit a new pulmonologist to establish care. She reports that for the past 3 months she has been having fevers/night sweats,20 pounds weight loss, fatigue. Within the past week she has been experiencing worsening diffuse crampy intermittent  abdominal pain 10/10 in intensity not associated with diarrhea. She also ntoes crampy bi 47 y.o F with pmh DMII, Nephrolithiasis, Sarcoidosis presenting from pulmonologist office with abnormal labs. PT said that she went to visit a new pulmonologist to establish care. She has not seen a pulmonologist or been treated for her Sarcoidosis for at least the last three years, despite being diagnosed with Sarcoidosis in 2010. She reports that for the past 3 months she has been having fevers/night sweats, 20 pounds weight loss, fatigue. Around 9/17/17, she was given antibiotics by her PCP for UTI after reporting burning during urination. Within the past week she has been experiencing worsening diffuse crampy intermittent abdominal pain 10/10 in intensity not associated with diarrhea. She also notes crampy bilateral leg pain below the knees since Friday. She has been using aleve 2-4 times daily to control the pain in her legs. This is on top of the aleve she has taken for several years for intermittent headaches. Prior to the last four days, ambulation beyond a few blocks was limited due to her shortness of breath, but now the pain her legs limits her ability to walk across the room.     In the ED, IVONNE Elaine 47 y.o F with pmh DMII, Nephrolithiasis, Sarcoidosis presenting from pulmonologist office with abnormal labs. PT said that she went to visit a new pulmonologist to establish care. She has not seen a pulmonologist or been treated for her Sarcoidosis for at least the last three years, despite being diagnosed with Sarcoidosis in 2010. She reports that for the past 3 months she has been having fevers/night sweats, 20 pounds weight loss, fatigue. Around 9/17/17, she was given antibiotics by her PCP for UTI after reporting burning during urination. Within the past week she has been experiencing worsening diffuse crampy intermittent abdominal pain 10/10 in intensity not associated with diarrhea. She also notes crampy bilateral leg pain below the knees since Friday. She has been using aleve 2-4 times daily to control the pain in her legs. This is on top of the aleve she has taken for several years for intermittent headaches. Prior to the last four days, ambulation beyond a few blocks was limited due to her shortness of breath, but now the pain her legs limits her ability to walk across the room.     In the ED, VS T 98.5F, HR 98/min, /70, RR 16/min, SpO2 100% on RA. Found to have Ca 15.3. She was given 2L NS bolus. 47 y.o F with pmh DMII, Nephrolithiasis, Sarcoidosis presenting from pulmonologist office with abnormal labs. PT said that she went to visit a new pulmonologist to establish care. She has not seen a pulmonologist or been treated for her Sarcoidosis for at least the last three years, despite being diagnosed with Sarcoidosis in 2010. She reports that for the past 3 months she has been having fevers/night sweats, 20 pounds weight loss, fatigue. Around 9/17/17, she was given antibiotics by her PCP for UTI after reporting burning during urination. Within the past week she has been experiencing worsening diffuse crampy intermittent abdominal pain 10/10 in intensity not associated with diarrhea. She also notes crampy bilateral leg pain below the knees since Friday. She has been using aleve 2-4 times daily to control the pain in her legs. This is on top of the aleve she has taken for several years for intermittent headaches. Prior to the last four days, ambulation beyond a few blocks was limited due to her shortness of breath, but now the pain her legs limits her ability to walk across the room.     In the ED, VS T 98.5F, HR 98/min, /70, RR 16/min, SpO2 100% on RA. Found to have Ca 15.3 and creatinine 5.65. Pt was given 2L NS bolus. Serum calcium improved to 12.8 and serum creatinine to 5.2. 47 y.o F with pmh DMII, Nephrolithiasis, Sarcoidosis presenting from pulmonologist office with abnormal labs. PT said that she went to visit a new pulmonologist to establish care. She has not seen a pulmonologist or been treated for her Sarcoidosis for at least the last three years, despite being diagnosed with Sarcoidosis in 2010. She reports that for the past 3 months she has been having fevers/night sweats, 20 pounds weight loss, and fatigue. Pt reports that on 9/17/17, she was given antibiotics by her PCP for UTI after reporting burning during urination. Within the past week she has been experiencing worsening diffuse crampy intermittent abdominal pain 10/10 in intensity not associated with diarrhea. She also notes crampy bilateral leg pain below the knees since Friday. She has been using aleve 2-4 times daily to control the pain in her legs. This is on top of the aleve she has taken for several years for intermittent headaches. Prior to the last four days, ambulation beyond a few blocks was limited due to her shortness of breath, but now the pain her legs limits her ability to walk across the room.     In the ED, VS T 98.5F, HR 98/min, /70, RR 16/min, SpO2 100% on RA. Found to have Ca 15.3 and creatinine 5.65. Pt was given 2L NS bolus. Serum calcium improved to 12.8 and serum creatinine to 5.2. 47 y.o F with pmh DMII, Nephrolithiasis, Sarcoidosis presenting from pulmonologist office with abnormal labs. PT said that she went to visit a new pulmonologist to establish care. She has not seen a pulmonologist or been treated for her Sarcoidosis for at least the last three years, despite being diagnosed with Sarcoidosis in 2010. She reports that for the past 3 months she has been having fevers/night sweats, 20 pounds weight loss, and fatigue. Pt reports that on 9/17/17, she was given antibiotics by her PCP for UTI after reporting burning during urination. Within the past week she has been experiencing worsening diffuse crampy intermittent abdominal pain 10/10 in intensity not associated with diarrhea. She also notes crampy bilateral leg pain below the knees since Friday. She has been using aleve 2-4 times daily to control the pain in her legs. This is on top of the aleve she has taken for several years for intermittent headaches. Prior to the last four days, ambulation beyond a few blocks was limited due to her shortness of breath, but now the pain her legs limits her ability to walk across the room without rest.   In the ED, her vital signs were T 98.5F, HR 98/min, /70, RR 16/min, SpO2 100% on RA. She was found to have Ca 15.3 and creatinine 5.65. Pt was given 2L NS bolus. Serum calcium improved to 12.8 and serum creatinine to 5.2. Pt endorses nausea, headaches, dizziness, back pain, cough, shortness of breath, orthopnea, skin nodules on right arm and right thigh. Denies vision changes, vomiting, diarrhea, changes in urinary or bowel frequency, or chest pain at this time. 47 y.o F with pmh DMII, Nephrolithiasis, Sarcoidosis presenting from pulmonologist office with abnormal labs. PT said that she went to visit a new pulmonologist to establish care. She has not seen a pulmonologist or been treated for her Sarcoidosis for at least the last three years, despite being diagnosed with Sarcoidosis in 2010. She reports that for the past 3 months she has been having fevers/night sweats, 20 pounds weight loss, and fatigue. Pt reports that on 9/17/17, she was given antibiotics by her PCP for UTI after reporting burning during urination. Within the past week she has been experiencing worsening diffuse crampy intermittent abdominal pain 10/10 in intensity not associated with diarrhea. She also notes crampy bilateral leg pain below the knees since Friday. She has been using aleve 2-4 times daily to control the pain in her legs. This is on top of the aleve she has taken for several years for intermittent headaches. Prior to the last four days, ambulation beyond a few blocks was limited due to her shortness of breath, but now the pain her legs limits her ability to walk across the room without rest. Pt endorses nausea, headaches, dizziness, back pain, cough, shortness of breath, orthopnea, skin changes on right arm and right thigh. Denies vision changes, vomiting, diarrhea, changes in urinary or bowel frequency, or chest pain at this time.    In the ED, her vital signs were T 98.5F, HR 98/min, /70, RR 16/min, SpO2 100% on RA. She was found to have Ca 15.3 and creatinine 5.65. Pt was given 2L NS bolus. Serum calcium improved to 12.8 and serum creatinine to 5.2. 47 y.o F with pmh DMII, Nephrolithiasis, Sarcoidosis presenting from pulmonologist office with abnormal labs. PT said that she went to visit a new pulmonologist to establish care. She has not seen a pulmonologist or been treated for her Sarcoidosis for at least the last three years, despite being diagnosed with Sarcoidosis in 2010. She reports that for the past 3 months she has been having fevers/night sweats, 20 pounds weight loss, and fatigue. Pt reports that on 9/17/17, she was given antibiotics by her PCP for UTI after reporting burning during urination. Within the past week she has been experiencing worsening diffuse crampy intermittent abdominal pain 10/10 in intensity not associated with diarrhea. She also notes crampy bilateral leg pain below the knees since Friday. She has been using aleve 2-4 times daily to control the pain in her legs. This is on top of the aleve she has taken for several years for intermittent headaches. Prior to the last four days, ambulation beyond a few blocks was limited due to her shortness of breath, but now the pain in her legs limits her ability to walk across the room without rest. Pt endorses nausea, headaches, dizziness, back pain, cough, shortness of breath, orthopnea, skin changes on right arm and right thigh. Denies vision changes, vomiting, diarrhea, changes in urinary or bowel frequency, or chest pain at this time.    In the ED, her vital signs were T 98.5F, HR 98/min, /70, RR 16/min, SpO2 100% on RA. She was found to have Ca 15.3 and creatinine 5.65. Pt was given 2L NS bolus. Serum calcium improved to 12.8 and serum creatinine to 5.2.

## 2017-09-26 NOTE — CONSULT NOTE ADULT - ATTENDING COMMENTS
patient seen and examined by me personally with fellow and resident   agree with assessment and plan as above   concern for progressive ENGLISH, fatigue, HA, subjective weakness, rash  LAD, renal failure,   need to r/o malignancy though sarcoid is highly likely the etiology  consider cardiac MRI to r/o cardiac sarcoid depending on ECHO  monitor Ca2++ and cardiac status closely   IVF for now - consider steroids if status changes but Ca is improving  will follow with you

## 2017-09-26 NOTE — H&P ADULT - NEGATIVE CARDIOVASCULAR SYMPTOMS
no paroxysmal nocturnal dyspnea/no chest pain/no orthopnea/no palpitations no paroxysmal nocturnal dyspnea/no palpitations/no chest pain

## 2017-09-26 NOTE — H&P ADULT - BACK EXAM
normal/normal shape/ROM intact normal shape/costovertebral angle tenderness L/costovertebral angle tenderness R/normal/ROM intact

## 2017-09-26 NOTE — H&P ADULT - CVS HE PE MLT D E PC
no rub/regular rate and rhythm systolic murmur/no rub/regular rate and rhythm regular rate and rhythm/no rub

## 2017-09-26 NOTE — CONSULT NOTE ADULT - SUBJECTIVE AND OBJECTIVE BOX
[facility] DIVISION OF KIDNEY DISEASES AND HYPERTENSION -- [phone]  -- INITIAL CONSULT NOTE  --------------------------------------------------------------------------------  HPI:  46 y/o female with history of DM, sarcoidosis since 2010 ( been  off prednisone for couple of years) presented to [facility] with compliant of weakness, back pain and LE pain. Nephrology consulted for elevated serum creatinine. Pt states she was told by  her PMD in August 2017, that her Scr was 1.6 which was slightly above then her usual serum creatinine. Her serum calcium was elevated at 10.3. Pt was subsequently seen by her pulmonologist on 9/22, who told her Scr was ~3. Pt as advised by her pulmonologist to go to ER. Pt was found to have Scr of 5.6 on 9/26 and serum calcium of 15.3 upon arrival to ER. Pt states she has never had any kidney disease before, or family history of kidney disease. Pt did state she has been taking aleve every day since past 4 days for back pain. Pt was started on IVF in ER, which improvement in Scr to 5.2 and serum calcium to 12.8. Pt seen and examined at bedside. PT currently denies urinary complaints. Pt reports intermittent SOB and abdominal pain.    PAST HISTORY  --------------------------------------------------------------------------------  PAST MEDICAL & SURGICAL HISTORY:  Diabetes  Sarcoidosis  No significant past surgical history    FAMILY HISTORY:    PAST SOCIAL HISTORY:    ALLERGIES & MEDICATIONS  --------------------------------------------------------------------------------  Allergies    No Known Allergies    Intolerances      Standing Inpatient Medications  sodium chloride 0.9%. 1000 milliLiter(s) IV Continuous <Continuous>  insulin lispro (HumaLOG) corrective regimen sliding scale   SubCutaneous three times a day before meals  insulin lispro (HumaLOG) corrective regimen sliding scale   SubCutaneous at bedtime  dextrose 5%. 1000 milliLiter(s) IV Continuous <Continuous>  dextrose 50% Injectable 12.5 Gram(s) IV Push once  dextrose 50% Injectable 25 Gram(s) IV Push once  dextrose 50% Injectable 25 Gram(s) IV Push once    PRN Inpatient Medications  dextrose Gel 1 Dose(s) Oral once PRN  glucagon  Injectable 1 milliGRAM(s) IntraMuscular once PRN      REVIEW OF SYSTEMS  --------------------------------------------------------------------------------  Gen: No fevers/chills  Skin: No rashes  Head/Eyes/Ears: Normal hearing,  Normal vision   Respiratory: + dyspnea, cough  CV: No chest pain  GI: + abdominal pain, no diarrhea, constipation, nausea, vomiting  : No dysuria, hematuria  MSK: No  edema  Heme: No easy bruising or bleeding  Psych: No significant depression    All other systems were reviewed and are negative, except as noted.    VITALS/PHYSICAL EXAM  --------------------------------------------------------------------------------  T(C): 37.1 (09-26-17 @ 13:16), Max: 37.1 (09-26-17 @ 08:30)  HR: 90 (09-26-17 @ 13:16) (80 - 98)  BP: 132/74 (09-26-17 @ 13:16) (130/70 - 160/82)  RR: 18 (09-26-17 @ 13:16) (16 - 20)  SpO2: 100% (09-26-17 @ 13:16) (100% - 100%)  Wt(kg): --        Physical Exam:  	Gen: NAD  	HEENT: MMM  	Pulm: CTA B/L  	CV: S1S2  	Abd: Soft, +BS   	Ext: No LE edema B/L  	Neuro: Awake, alert  	Skin: Warm and dry  	    LABS/STUDIES  --------------------------------------------------------------------------------              8.6    4.33  >-----------<  195      [09-26-17 @ 09:09]              26.5     137  |  99  |  73  ----------------------------<  86      [09-26-17 @ 07:35]  5.5   |  20  |  5.21        Ca     12.8     [09-26-17 @ 07:35]    TPro  7.3  /  Alb  x   /  TBili  x   /  DBili  x   /  AST  x   /  ALT  x   /  AlkPhos  x   [09-26-17 @ 09:09]          Creatinine Trend:  SCr 5.21 [09-26 @ 07:35]  SCr 5.65 [09-25 @ 23:40]      Urine Creatinine 35.32      [09-26-17 @ 08:34]  Urine Protein 22.7      [09-26-17 @ 09:21]  Urine Sodium 102      [09-26-17 @ 08:34]    PTH 8.12 (Ca --)      [09-26-17 @ 07:35]   --  HbA1c 6.9      [09-26-17 @ 09:39] [facility] DIVISION OF KIDNEY DISEASES AND HYPERTENSION -- [phone]  -- INITIAL CONSULT NOTE  --------------------------------------------------------------------------------  HPI: 47-year-old female with history of DM, sarcoidosis since 2010 ( been  off prednisone for couple of years) presented to [facility] with complaints of weakness, back pain and LE pain. Nephrology consulted for elevated serum creatinine. Pt states she was told by  her PMD in August 2017 that her Scr was 1.6 which was slightly above then her usual serum creatinine. Her serum calcium was elevated at 10.3 at that time. Pt was subsequently seen by her pulmonologist on 9/22 who told her that Scr was ~3. Pt. was advised by her pulmonologist to go to ER. Pt was found to have Scr of 5.6 and serum calcium of 15.3 on labs done upon arrival to ER. Pt. denies any personal or family history of kidney disease. Pt. gives history of taking NSAIDs (aleve) every day since past 4 days for back pain. Pt was started on IVF in ER, which improvement in Scr to 5.2 and serum calcium to 12.8. Pt seen and examined at bedside. Pt. denies urinary complaints. Pt. gives history of reports intermittent SOB and abdominal pain.    PAST HISTORY  --------------------------------------------------------------------------------  PAST MEDICAL & SURGICAL HISTORY:  Diabetes  Sarcoidosis  No significant past surgical history    FAMILY HISTORY:    PAST SOCIAL HISTORY:    ALLERGIES & MEDICATIONS  --------------------------------------------------------------------------------  Allergies    No Known Allergies    Intolerances      Standing Inpatient Medications  sodium chloride 0.9%. 1000 milliLiter(s) IV Continuous <Continuous>  insulin lispro (HumaLOG) corrective regimen sliding scale   SubCutaneous three times a day before meals  insulin lispro (HumaLOG) corrective regimen sliding scale   SubCutaneous at bedtime  dextrose 5%. 1000 milliLiter(s) IV Continuous <Continuous>  dextrose 50% Injectable 12.5 Gram(s) IV Push once  dextrose 50% Injectable 25 Gram(s) IV Push once  dextrose 50% Injectable 25 Gram(s) IV Push once    PRN Inpatient Medications  dextrose Gel 1 Dose(s) Oral once PRN  glucagon  Injectable 1 milliGRAM(s) IntraMuscular once PRN      REVIEW OF SYSTEMS  --------------------------------------------------------------------------------  Gen: No fevers/chills  Skin: No rashes  Head/Eyes/Ears: Normal hearing,  Normal vision   Respiratory: + intermittent dyspnea, cough  CV: No chest pain  GI: + abdominal pain, no diarrhea, constipation, nausea, vomiting  : No dysuria, hematuria  MSK: No  edema  Heme: No easy bruising or bleeding  Psych: No significant depression    All other systems were reviewed and are negative, except as noted.    VITALS/PHYSICAL EXAM  --------------------------------------------------------------------------------  T(C): 37.1 (09-26-17 @ 13:16), Max: 37.1 (09-26-17 @ 08:30)  HR: 90 (09-26-17 @ 13:16) (80 - 98)  BP: 132/74 (09-26-17 @ 13:16) (130/70 - 160/82)  RR: 18 (09-26-17 @ 13:16) (16 - 20)  SpO2: 100% (09-26-17 @ 13:16) (100% - 100%)  Wt(kg): --        Physical Exam:  	Gen: NAD  	HEENT: MMM  	Pulm: CTA B/L  	CV: S1S2  	Abd: Soft, +BS   	Ext: No LE edema B/L  	Neuro: Awake, alert  	Skin: Warm and dry  	    LABS/STUDIES  --------------------------------------------------------------------------------              8.6    4.33  >-----------<  195      [09-26-17 @ 09:09]              26.5     137  |  99  |  73  ----------------------------<  86      [09-26-17 @ 07:35]  5.5   |  20  |  5.21        Ca     12.8     [09-26-17 @ 07:35]    TPro  7.3  /  Alb  x   /  TBili  x   /  DBili  x   /  AST  x   /  ALT  x   /  AlkPhos  x   [09-26-17 @ 09:09]          Creatinine Trend:  SCr 5.21 [09-26 @ 07:35]  SCr 5.65 [09-25 @ 23:40]      Urine Creatinine 35.32      [09-26-17 @ 08:34]  Urine Protein 22.7      [09-26-17 @ 09:21]  Urine Sodium 102      [09-26-17 @ 08:34]    PTH 8.12 (Ca --)      [09-26-17 @ 07:35]   --  HbA1c 6.9      [09-26-17 @ 09:39]

## 2017-09-26 NOTE — H&P ADULT - NEGATIVE OPHTHALMOLOGIC SYMPTOMS
no diplopia/no lacrimation R/no blurred vision L/no blurred vision R/no photophobia/no lacrimation L

## 2017-09-27 LAB
BASOPHILS # BLD AUTO: 0 K/UL — SIGNIFICANT CHANGE UP (ref 0–0.2)
BASOPHILS NFR BLD AUTO: 0 % — SIGNIFICANT CHANGE UP (ref 0–2)
BUN SERPL-MCNC: 55 MG/DL — HIGH (ref 7–23)
BUN SERPL-MCNC: 59 MG/DL — HIGH (ref 7–23)
C-ANCA SER-ACNC: NEGATIVE — SIGNIFICANT CHANGE UP
CALCIUM SERPL-MCNC: 11.5 MG/DL — HIGH (ref 8.4–10.5)
CALCIUM SERPL-MCNC: 11.9 MG/DL — HIGH (ref 8.4–10.5)
CHLORIDE SERPL-SCNC: 105 MMOL/L — SIGNIFICANT CHANGE UP (ref 98–107)
CHLORIDE SERPL-SCNC: 107 MMOL/L — SIGNIFICANT CHANGE UP (ref 98–107)
CK SERPL-CCNC: 10 U/L — LOW (ref 25–170)
CO2 SERPL-SCNC: 17 MMOL/L — LOW (ref 22–31)
CO2 SERPL-SCNC: 20 MMOL/L — LOW (ref 22–31)
CREAT SERPL-MCNC: 4.3 MG/DL — HIGH (ref 0.5–1.3)
CREAT SERPL-MCNC: 4.61 MG/DL — HIGH (ref 0.5–1.3)
CRP SERPL-MCNC: 10.9 MG/L — HIGH
EOSINOPHIL # BLD AUTO: 0.55 K/UL — HIGH (ref 0–0.5)
EOSINOPHIL NFR BLD AUTO: 14.4 % — HIGH (ref 0–6)
ERYTHROCYTE [SEDIMENTATION RATE] IN BLOOD: 40 MM/HR — HIGH (ref 4–25)
FERRITIN SERPL-MCNC: 112.8 NG/ML — SIGNIFICANT CHANGE UP (ref 15–150)
GAS PNL BLDMV: SIGNIFICANT CHANGE UP
GAS PNL BLDMV: SIGNIFICANT CHANGE UP
GLUCOSE SERPL-MCNC: 122 MG/DL — HIGH (ref 70–99)
GLUCOSE SERPL-MCNC: 80 MG/DL — SIGNIFICANT CHANGE UP (ref 70–99)
HAPTOGLOB SERPL-MCNC: 98 MG/DL — SIGNIFICANT CHANGE UP (ref 34–200)
HBA1C BLD-MCNC: 6.9 % — HIGH (ref 4–5.6)
HCT VFR BLD CALC: 24.8 % — LOW (ref 34.5–45)
HGB BLD-MCNC: 8.1 G/DL — LOW (ref 11.5–15.5)
HIV 1+2 AB+HIV1 P24 AG SERPL QL IA: SIGNIFICANT CHANGE UP
IGA FLD-MCNC: 400 MG/DL — SIGNIFICANT CHANGE UP (ref 70–400)
IGG FLD-MCNC: 1581 MG/DL — SIGNIFICANT CHANGE UP (ref 700–1600)
IGM SERPL-MCNC: 99 MG/DL — SIGNIFICANT CHANGE UP (ref 40–230)
IMM GRANULOCYTES # BLD AUTO: 0.04 # — SIGNIFICANT CHANGE UP
IMM GRANULOCYTES NFR BLD AUTO: 1 % — SIGNIFICANT CHANGE UP (ref 0–1.5)
IRON SATN MFR SERPL: 202 UG/DL — SIGNIFICANT CHANGE UP (ref 140–530)
IRON SATN MFR SERPL: 36 UG/DL — SIGNIFICANT CHANGE UP (ref 30–160)
KAPPA FREE LIGHT CHAINS, SERUM: 17.4 MG/DL — HIGH (ref 0.33–1.94)
LAMBDA FREE LIGHT CHAINS, SERUM: 8.98 MG/DL — HIGH (ref 0.57–2.63)
LDH SERPL L TO P-CCNC: 130 U/L — LOW (ref 135–225)
LYMPHOCYTES # BLD AUTO: 0.76 K/UL — LOW (ref 1–3.3)
LYMPHOCYTES # BLD AUTO: 19.9 % — SIGNIFICANT CHANGE UP (ref 13–44)
MCHC RBC-ENTMCNC: 25.4 PG — LOW (ref 27–34)
MCHC RBC-ENTMCNC: 32.7 % — SIGNIFICANT CHANGE UP (ref 32–36)
MCV RBC AUTO: 77.7 FL — LOW (ref 80–100)
MONOCYTES # BLD AUTO: 0.5 K/UL — SIGNIFICANT CHANGE UP (ref 0–0.9)
MONOCYTES NFR BLD AUTO: 13.1 % — SIGNIFICANT CHANGE UP (ref 2–14)
NEUTROPHILS # BLD AUTO: 1.96 K/UL — SIGNIFICANT CHANGE UP (ref 1.8–7.4)
NEUTROPHILS NFR BLD AUTO: 51.6 % — SIGNIFICANT CHANGE UP (ref 43–77)
NRBC # FLD: 0 — SIGNIFICANT CHANGE UP
P-ANCA SER-ACNC: NEGATIVE — SIGNIFICANT CHANGE UP
PLATELET # BLD AUTO: 202 K/UL — SIGNIFICANT CHANGE UP (ref 150–400)
PMV BLD: 9.7 FL — SIGNIFICANT CHANGE UP (ref 7–13)
POTASSIUM SERPL-MCNC: 4.5 MMOL/L — SIGNIFICANT CHANGE UP (ref 3.5–5.3)
POTASSIUM SERPL-MCNC: 5 MMOL/L — SIGNIFICANT CHANGE UP (ref 3.5–5.3)
POTASSIUM SERPL-SCNC: 4.5 MMOL/L — SIGNIFICANT CHANGE UP (ref 3.5–5.3)
POTASSIUM SERPL-SCNC: 5 MMOL/L — SIGNIFICANT CHANGE UP (ref 3.5–5.3)
PROT SERPL-MCNC: 6.3 G/DL — SIGNIFICANT CHANGE UP (ref 6–8.3)
PROTEINASE3 AB FLD-ACNC: <5 UNITS — SIGNIFICANT CHANGE UP
RBC # BLD: 3.19 M/UL — LOW (ref 3.8–5.2)
RBC # FLD: 12.9 % — SIGNIFICANT CHANGE UP (ref 10.3–14.5)
RETICS #: 0 10X6/UL — LOW (ref 0.02–0.07)
RETICS/RBC NFR: 0.8 % — SIGNIFICANT CHANGE UP (ref 0.5–2.5)
SODIUM SERPL-SCNC: 138 MMOL/L — SIGNIFICANT CHANGE UP (ref 135–145)
SODIUM SERPL-SCNC: 139 MMOL/L — SIGNIFICANT CHANGE UP (ref 135–145)
UIBC SERPL-MCNC: 166 UG/DL — SIGNIFICANT CHANGE UP (ref 110–370)
WBC # BLD: 3.81 K/UL — SIGNIFICANT CHANGE UP (ref 3.8–10.5)
WBC # FLD AUTO: 3.81 K/UL — SIGNIFICANT CHANGE UP (ref 3.8–10.5)

## 2017-09-27 PROCEDURE — 99233 SBSQ HOSP IP/OBS HIGH 50: CPT | Mod: GC

## 2017-09-27 PROCEDURE — 99232 SBSQ HOSP IP/OBS MODERATE 35: CPT | Mod: GC

## 2017-09-27 RX ORDER — LANOLIN ALCOHOL/MO/W.PET/CERES
3 CREAM (GRAM) TOPICAL AT BEDTIME
Qty: 0 | Refills: 0 | Status: DISCONTINUED | OUTPATIENT
Start: 2017-09-27 | End: 2017-10-03

## 2017-09-27 RX ADMIN — OXYCODONE AND ACETAMINOPHEN 1 TABLET(S): 5; 325 TABLET ORAL at 21:59

## 2017-09-27 RX ADMIN — Medication 3 MILLIGRAM(S): at 22:45

## 2017-09-27 RX ADMIN — OXYCODONE AND ACETAMINOPHEN 1 TABLET(S): 5; 325 TABLET ORAL at 00:58

## 2017-09-27 RX ADMIN — OXYCODONE AND ACETAMINOPHEN 1 TABLET(S): 5; 325 TABLET ORAL at 00:28

## 2017-09-27 RX ADMIN — SODIUM CHLORIDE 200 MILLILITER(S): 9 INJECTION INTRAMUSCULAR; INTRAVENOUS; SUBCUTANEOUS at 02:56

## 2017-09-27 RX ADMIN — SODIUM CHLORIDE 100 MILLILITER(S): 9 INJECTION INTRAMUSCULAR; INTRAVENOUS; SUBCUTANEOUS at 14:49

## 2017-09-27 RX ADMIN — Medication 1: at 17:44

## 2017-09-27 RX ADMIN — OXYCODONE AND ACETAMINOPHEN 1 TABLET(S): 5; 325 TABLET ORAL at 21:29

## 2017-09-27 NOTE — CONSULT NOTE ADULT - SUBJECTIVE AND OBJECTIVE BOX
CC: Patient is a 47y old female who presents with a chief complaint of malaise, cough (26 Sep 2017 16:12)        Patient is a 47y year old female with PMHx of sarcoidosis (dx'ed , not on medications x 3 years) who presents from pulmonologist office with abnormal labs (hypercalcemia, hyperkalemia). She reports that for the past month she has had worsening cough, fevers/night sweats, fatigue, decreased po intake, and myalgias. Reports also 30 pound weight loss over 1 year.          ROS:  REVIEW OF SYSTEMS:  CONSTITUTIONAL: SEE HPI  EYES: No eye pain, visual disturbances, or discharge  ENMT:  No difficulty hearing, tinnitus, vertigo; No sinus or throat pain  RESPIRATORY: + COUGH, + SOB, No hemoptysis  CARDIOVASCULAR: No chest pain, palpitations, dizziness, or leg swelling  GASTROINTESTINAL: +NAUSEA, VOMITING, No diarrhea or constipation. No melena or hematochezia.  GENITOURINARY: RECENT UTI DX'ed 2 weeks ago; Currently no frequency, hematuria, or incontinence  NEUROLOGICAL: No headaches, memory loss, loss of strength, numbness, or tremors  SKIN: No itching, burning, rashes, or lesions   MUSCULOSKELETAL: No joint pain or swelling; No muscle, back, or extremity pain      PMH  Diabetes  Sarcoidosis    PSH  Transbronchial biopsy   Lap montse   R inguinal lymph node bx  (at [facility] in [place], "not cancer," granulomatous lymphadenitis per rheum note)    MEDS  see eMAR    Allergies  No Known Allergies or Intolerances      Social  Denies tobacco  Denies EtOH  Lives with mother and kids  Works at [employer]    Fam History  Mother: Liver disease from HCV, DM  Father: DM  Denies family history of blood disorders    Physical Exam  T(C): 36.7 (17 @ 11:05), Max: 36.8 (17 @ 21:33)  HR: 92 (17 @ 11:05) (82 - 95)  BP: 132/72 (17 @ 11:05) (127/59 - 148/89)  RR: 17 (17 @ 11:05) (16 - 18)  SpO2: 100% (17 @ 11:05) (100% - 100%)  Wt(kg): --  Tmax: T(C): , Max: 36.8 (17 @ 21:33)  Wt(kg): --    Gen: NAD  HEENT: normocephalic, atraumatic, no scleral icterus; Bulky cervical and axillary lymphadenopathy B/L, nontender  CV: S1, S2, RRR  Pulm: nonlabored respirations  Abd: Soft, ND, NTP, well-healed Pfannenstiel incision, no rebound, no guarding, no palpable organomegaly/masses  Groin: obvious inguinal lymphadenopathy, nontender; well-healed R groin incision  Ext: warm, no edema, palp DP      Labs:                        8.1    3.81  )-----------( 202      ( 27 Sep 2017 06:30 )             24.8         139  |  107  |  55<H>  ----------------------------<  80  4.5   |  17<L>  |  4.30<H>    Ca    11.5<H>      27 Sep 2017 06:30    TPro  6.3  /  Alb  x   /  TBili  x   /  DBili  x   /  AST  x   /  ALT  x   /  AlkPhos  x         Urinalysis Basic - ( 26 Sep 2017 15:02 )    Color: PLYEL / Appearance: CLEAR / S.010 / pH: 6.5  Gluc: 50 / Ketone: NEGATIVE  / Bili: NEGATIVE / Urobili: NORMAL E.U.   Blood: NEGATIVE / Protein: 30 / Nitrite: NEGATIVE   Leuk Esterase: NEGATIVE / RBC: 0-2 / WBC 0-2   Sq Epi: OCC / Non Sq Epi: x / Bacteria: x            Imaging  < from: CT Chest No Cont (17 @ 16:00) >  FINDINGS:    CHEST:     LUNGS AND LARGE AIRWAYS: Patent central airways. Mild fibrosis and   traction bronchiectasis involving the right middle lobe, lingula and to a   lesser extent the periphery of both lower lobes. No pulmonary nodules.  PLEURA: No pleural effusion.  VESSELS: Within normal limits.  HEART: Heart size is normal. No pericardial effusion.  MEDIASTINUM AND SANJANA: 1.7 x 1.3 cm prevascular adenopathy. 2.4 x 1.7 cm   subcarinal adenopathy. Additional smaller paratracheal and bilateral   hilar lymph nodes.  CHEST WALL AND LOWER NECK: Bulky bilateral axillary and subcutaneous   pectoral adenopathy measuring up to 3.6 x 3.3 cm on the right (series 2,   image 27) and 3.2 x 2.3 cm on the left (series 2, image 29). Bilateral   supraclavicular adenopathy is also noted.  VISUALIZED UPPER ABDOMEN: Bulky upper abdominal adenopathy noted. Status   post cholecystectomy.  BONES: Within normal limits.    IMPRESSION: Bulky adenopathy in the chest and upper abdomen which can be   seen with chronic lymphocytic leukemia.   Mild bibasilar pulmonary fibrosis.    < end of copied text >

## 2017-09-27 NOTE — PROGRESS NOTE ADULT - PROBLEM SELECTOR PLAN 3
Normocytic/microcytic anemia. Differential includes anemia of chronic disease vs iron deficiency. Low suspicion for hemolysis given normal Bilirubin and unchanged Hg level from prior examinations   -Check iron studies  -Monitor CBC daily Normocytic/microcytic anemia. Differential includes anemia of chronic disease vs iron deficiency less likely given normal iron studies vs thalessemia. Low suspicion for hemolysis given normal Bilirubin.   -Iron studies wnL (iron, ferritin, TIBC, transferrin)  -Monitor CBC daily

## 2017-09-27 NOTE — PROGRESS NOTE ADULT - PROBLEM SELECTOR PLAN 6
Pt has been off treatment for over 3 years because she has not followed up with her pulmonologist  -Rheum consult appreciated. Pt has hx of biopsy proven sarcoid in 2007 but no paper work regarding results. Pt would benefit from   -Will obtain Echo given murmur   -Will discuss today role of steroids given elevated   -F/u ACE level   -Talk to outpatient PCP today

## 2017-09-27 NOTE — PROGRESS NOTE ADULT - SUBJECTIVE AND OBJECTIVE BOX
MAYRAAVINASH BEVERLY  [id number]    INTERVAL HPI/OVERNIGHT EVENTS:    MEDICATIONS  (STANDING):  sodium chloride 0.9%. 1000 milliLiter(s) (200 mL/Hr) IV Continuous <Continuous>  insulin lispro (HumaLOG) corrective regimen sliding scale   SubCutaneous three times a day before meals  insulin lispro (HumaLOG) corrective regimen sliding scale   SubCutaneous at bedtime  dextrose 5%. 1000 milliLiter(s) (50 mL/Hr) IV Continuous <Continuous>  dextrose 50% Injectable 12.5 Gram(s) IV Push once  dextrose 50% Injectable 25 Gram(s) IV Push once  dextrose 50% Injectable 25 Gram(s) IV Push once  influenza   Vaccine 0.5 milliLiter(s) IntraMuscular once    MEDICATIONS  (PRN):  dextrose Gel 1 Dose(s) Oral once PRN Blood Glucose LESS THAN 70 milliGRAM(s)/deciliter  glucagon  Injectable 1 milliGRAM(s) IntraMuscular once PRN Glucose LESS THAN 70 milligrams/deciliter  oxyCODONE    5 mG/acetaminophen 325 mG 1 Tablet(s) Oral every 6 hours PRN Severe Pain (7 - 10)    Allergies: No Known Allergies    Review of Systems:   General:   HEENT:   CVS:   Resp:   GI:   MSK:   Skin:   Neuro:       Vital Signs Last 24 Hrs  T(C): 36.7 (27 Sep 2017 11:05), Max: 36.8 (26 Sep 2017 21:33)  T(F): 98 (27 Sep 2017 11:05), Max: 98.3 (26 Sep 2017 21:33)  HR: 92 (27 Sep 2017 11:05) (82 - 95)  BP: 132/72 (27 Sep 2017 11:05) (127/59 - 149/89)  RR: 17 (27 Sep 2017 11:05) (16 - 20)  SpO2: 100% (27 Sep 2017 11:05) (100% - 100%)    Physical Exam:  General:  HEENT:   Cardio:   Resp:   Abd:   MSK:  Neuro:   Psych:     LABS:                        8.1    3.81  )-----------( 202      ( 27 Sep 2017 06:30 )             24.8     Complete Blood Count + Automated Diff (17 @ 06:30)    Mean Cell Volume: 77.7 fL    Auto Neutrophil #: 1.96 K/uL    Auto Lymphocyte #: 0.76 K/uL    Auto Eosinophil #: 0.55 K/uL    Auto Neutrophil %: 51.6 %    Auto Lymphocyte %: 19.9 %    Auto Eosinophil %: 14.4 %          139  |  107  |  55<H>  ----------------------------<  80  4.5   |  17<L>  |  4.30<H>    Ca    11.5<H>      27 Sep 2017 06:30    TPro  6.3  /  Alb  x   /  TBili  x   /  DBili  x   /  AST  x   /  ALT  x   /  AlkPhos  x       Sedimentation Rate, Erythrocyte: 40 mm/hr (17 @ 06:30)  C-Reactive Protein, Serum: 10.9 mg/L (17 @ :30)    Iron with Total Binding Capacity in AM (17 @ :30)    Iron Total, Serum: 36 ug/dL    Unsaturated Iron Binding Capacity: 166 ug/dL    Total Iron Binding Capacity.: 202 ug/dL  Lactate Dehydrogenase, Serum: 130 U/L (17 @ :30)  Haptoglobin, Serum: 98 mg/dL (17 @ 06:30)  Reticulocyte Count in AM (17 @ 06:30)    Reticulocyte Percent: 0.8 %    Absolute Reticulocytes: 0.000 10x6/uL  Immunoglobulin, Serum (17 @ 06:30)    Quantitative IgA: 400 mg/dL    Quantitative Ig mg/dL    Quantitative IgM: 99 mg/dL  Serum Protein Electrophoresis -- 2017 ----------------------------------------------------------                                                  Reference Range         Total Protein -  7.30                6.2 - 8.2 G/DL             Albumin   -  3.43                3.3 - 4.4 G/DL      Alpha-1 Globulin -  0.23             0.1 - 0.3 G/DL      Alpha-2 Globulin -  0.76             0.6 - 1.0 G/DL      Beta Globulins   -  0.76                0.6 - 1.1 G/DL      Gamma Globulins  -  2.12            0.7 - 1.7 G/DL      ASSESSMENT:        INCREASED POLYCLONAL GAMMA FRACTION SUGGESTIVE OF CHRONIC      INFLAMMATION OR IMMUNE REACTION.    --------------------------------------------------------------------------------------------    Urine Protein Electrophoresis -- 2017 ----------------------------------------------------------                                   Albumin  -  PRESENT      Alpha-1 Globulin  -  PRESENT      Alpha-2 Globulin  -  PRESENT      Beta Globulins    -  PRESENT      Gamma Globulins   -  PRESENT      Total Protein     -  22.7 MG/DL      Specimen Type     -  RANDOM URINE X100 CONC            BANDS SEEN IN GAMMA REGION      ASSESSMENT:        DISTINCT BANDS IN GAMMA REGION ARE NOTED. SERUM AND URINE IMMUNOFIXATION      ELECTROPHORESIS ARE RECOMMENDED IF NOT PREVIOUSLY PERFORMED.    --------------------------------------------------------------------------------------------    Urinalysis Basic - ( 26 Sep 2017 15:02 )    Color: PLYEL / Appearance: CLEAR / S.010 / pH: 6.5  Gluc: 50 / Ketone: NEGATIVE  / Bili: NEGATIVE / Urobili: NORMAL E.U.   Blood: NEGATIVE / Protein: 30 / Nitrite: NEGATIVE   Leuk Esterase: NEGATIVE / RBC: 0-2 / WBC 0-2   Sq Epi: OCC / Non Sq Epi: x / Bacteria: x      RADIOLOGY & ADDITIONAL TESTS:   Ultrasound of the Kidneys 2017  "IMPRESSION: No hydronephrosis. Findings compatible with medical renal disease."    CT Chest 2017  "LUNGS AND LARGE AIRWAYS: Patent central airways. Mild fibrosis and   traction bronchiectasis involving the right middle lobe, lingula and to a   lesser extent the periphery of both lower lobes. No pulmonary nodules.  PLEURA: No pleural effusion.  VESSELS: Within normal limits.  HEART: Heart size is normal. No pericardial effusion.  MEDIASTINUM AND SANJANA: 1.7 x 1.3 cm prevascular adenopathy. 2.4 x 1.7 cm   subcarinal adenopathy. Additional smaller paratracheal and bilateral   hilar lymph nodes.  CHEST WALL AND LOWER NECK: Bulky bilateral axillary and subcutaneous   pectoral adenopathy measuring up to 3.6 x 3.3 cm on the right (series 2,   image 27) and 3.2 x 2.3 cm on the left (series 2, image 29). Bilateral   supraclavicular adenopathy is also noted.  VISUALIZED UPPER ABDOMEN: Bulky upper abdominal adenopathy noted. Status   post cholecystectomy.  BONES: Within normal limits.    IMPRESSION: Bulky adenopathy in the chest and upper abdomen which can be   seen with chronic lymphocytic leukemia.   Mild bibasilar pulmonary fibrosis." LEONEL SIRIA  [id number]    INTERVAL HPI/OVERNIGHT EVENTS:  Awake multiple times overnight; mostly feeling better than yesterday    MEDICATIONS  (STANDING):  sodium chloride 0.9%. 1000 milliLiter(s) (200 mL/Hr) IV Continuous <Continuous>  insulin lispro (HumaLOG) corrective regimen sliding scale   SubCutaneous three times a day before meals  insulin lispro (HumaLOG) corrective regimen sliding scale   SubCutaneous at bedtime  dextrose 5%. 1000 milliLiter(s) (50 mL/Hr) IV Continuous <Continuous>  dextrose 50% Injectable 12.5 Gram(s) IV Push once  dextrose 50% Injectable 25 Gram(s) IV Push once  dextrose 50% Injectable 25 Gram(s) IV Push once  influenza   Vaccine 0.5 milliLiter(s) IntraMuscular once    MEDICATIONS  (PRN):  dextrose Gel 1 Dose(s) Oral once PRN Blood Glucose LESS THAN 70 milliGRAM(s)/deciliter  glucagon  Injectable 1 milliGRAM(s) IntraMuscular once PRN Glucose LESS THAN 70 milligrams/deciliter  oxyCODONE    5 mG/acetaminophen 325 mG 1 Tablet(s) Oral every 6 hours PRN Severe Pain (7 - 10)    Allergies: No Known Allergies    Review of Systems (w/ reference to overnight & today):   General: no fevers, no chills, no night sweats  HEENT: no lacrimation  CVS: no chest pain, no palpitations  Resp: no shortness of breath  GI: no nausea, no vomiting; last bowel movement  was not bloody or black/tarry  MSK: leg pain 5/10, improved from 8.5/10  Neuro: no headache or dizziness      Vital Signs Last 24 Hrs  T(C): 36.7 (27 Sep 2017 11:05), Max: 36.8 (26 Sep 2017 21:33)  T(F): 98 (27 Sep 2017 11:05), Max: 98.3 (26 Sep 2017 21:33)  HR: 92 (27 Sep 2017 11:05) (82 - 95)  BP: 132/72 (27 Sep 2017 11:05) (127/59 - 149/89)  RR: 17 (27 Sep 2017 11:05) (16 - 20)  SpO2: 100% (27 Sep 2017 11:05) (100% - 100%)    Physical Exam:  General: no acute distress  Lymph: Swollen lymph nodes palpable in areas including: pre-auricular, submandibular, anterior and posterior cervical, supraclavicular, axillary  HEENT: pupils round, equal-sized  Cardio: regular rate and rhythm  Resp: bibasilar fine crackles  Abd: soft, non-tender  MSK: knees and calves non-tender bilaterally  Neuro: moves all four extremities  Psych: pleasant, coherent, goal-oriented    LABS:                        8.1    3.81  )-----------( 202      ( 27 Sep 2017 06:30 )             24.8     Complete Blood Count + Automated Diff (17 @ 06:30)    Mean Cell Volume: 77.7 fL    Auto Neutrophil #: 1.96 K/uL    Auto Lymphocyte #: 0.76 K/uL    Auto Eosinophil #: 0.55 K/uL    Auto Neutrophil %: 51.6 %    Auto Lymphocyte %: 19.9 %    Auto Eosinophil %: 14.4 %          139  |  107  |  55<H>  ----------------------------<  80  4.5   |  17<L>  |  4.30<H>    Ca    11.5<H>      27 Sep 2017 06:30    TPro  6.3  /  Alb  x   /  TBili  x   /  DBili  x   /  AST  x   /  ALT  x   /  AlkPhos  x       Sedimentation Rate, Erythrocyte: 40 mm/hr (17 @ 06:30)  C-Reactive Protein, Serum: 10.9 mg/L (17 @ 06:30)    Iron with Total Binding Capacity in AM (17 @ 06:30)    Iron Total, Serum: 36 ug/dL    Unsaturated Iron Binding Capacity: 166 ug/dL    Total Iron Binding Capacity.: 202 ug/dL  Lactate Dehydrogenase, Serum: 130 U/L (17 @ 06:30)  Haptoglobin, Serum: 98 mg/dL (17 @ 06:30)  Reticulocyte Count in AM (17 @ 06:30)    Reticulocyte Percent: 0.8 %    Absolute Reticulocytes: 0.000 10x6/uL  Immunoglobulin, Serum (17 @ 06:30)    Quantitative IgA: 400 mg/dL    Quantitative Ig mg/dL    Quantitative IgM: 99 mg/dL  Serum Protein Electrophoresis -- 2017 ----------------------------------------------------------                                                  Reference Range         Total Protein -  7.30                6.2 - 8.2 G/DL             Albumin   -  3.43                3.3 - 4.4 G/DL      Alpha-1 Globulin -  0.23             0.1 - 0.3 G/DL      Alpha-2 Globulin -  0.76             0.6 - 1.0 G/DL      Beta Globulins   -  0.76                0.6 - 1.1 G/DL      Gamma Globulins  -  2.12            0.7 - 1.7 G/DL      ASSESSMENT:        INCREASED POLYCLONAL GAMMA FRACTION SUGGESTIVE OF CHRONIC      INFLAMMATION OR IMMUNE REACTION.    --------------------------------------------------------------------------------------------    Urine Protein Electrophoresis -2017 ----------------------------------------------------------                                   Albumin  -  PRESENT      Alpha-1 Globulin  -  PRESENT      Alpha-2 Globulin  -  PRESENT      Beta Globulins    -  PRESENT      Gamma Globulins   -  PRESENT      Total Protein     -  22.7 MG/DL      Specimen Type     -  RANDOM URINE X100 CONC            BANDS SEEN IN GAMMA REGION      ASSESSMENT:        DISTINCT BANDS IN GAMMA REGION ARE NOTED. SERUM AND URINE IMMUNOFIXATION      ELECTROPHORESIS ARE RECOMMENDED IF NOT PREVIOUSLY PERFORMED.    --------------------------------------------------------------------------------------------    Urinalysis Basic - ( 26 Sep 2017 15:02 )    Color: PLYEL / Appearance: CLEAR / S.010 / pH: 6.5  Gluc: 50 / Ketone: NEGATIVE  / Bili: NEGATIVE / Urobili: NORMAL E.U.   Blood: NEGATIVE / Protein: 30 / Nitrite: NEGATIVE   Leuk Esterase: NEGATIVE / RBC: 0-2 / WBC 0-2   Sq Epi: OCC / Non Sq Epi: x / Bacteria: x      RADIOLOGY & ADDITIONAL TESTS:   Ultrasound of the Kidneys 2017  "IMPRESSION: No hydronephrosis. Findings compatible with medical renal disease."    CT Chest 2017  "LUNGS AND LARGE AIRWAYS: Patent central airways. Mild fibrosis and   traction bronchiectasis involving the right middle lobe, lingula and to a   lesser extent the periphery of both lower lobes. No pulmonary nodules.  PLEURA: No pleural effusion.  VESSELS: Within normal limits.  HEART: Heart size is normal. No pericardial effusion.  MEDIASTINUM AND SANJANA: 1.7 x 1.3 cm prevascular adenopathy. 2.4 x 1.7 cm   subcarinal adenopathy. Additional smaller paratracheal and bilateral   hilar lymph nodes.  CHEST WALL AND LOWER NECK: Bulky bilateral axillary and subcutaneous   pectoral adenopathy measuring up to 3.6 x 3.3 cm on the right (series 2,   image 27) and 3.2 x 2.3 cm on the left (series 2, image 29). Bilateral   supraclavicular adenopathy is also noted.  VISUALIZED UPPER ABDOMEN: Bulky upper abdominal adenopathy noted. Status   post cholecystectomy.  BONES: Within normal limits.    IMPRESSION: Bulky adenopathy in the chest and upper abdomen which can be   seen with chronic lymphocytic leukemia.   Mild bibasilar pulmonary fibrosis." LEONEL SIRIA  [id number]    INTERVAL HPI/OVERNIGHT EVENTS:  Awake multiple times overnight; mostly feeling better than yesterday    MEDICATIONS  (STANDING):  sodium chloride 0.9%. 1000 milliLiter(s) (200 mL/Hr) IV Continuous <Continuous>  insulin lispro (HumaLOG) corrective regimen sliding scale   SubCutaneous three times a day before meals  insulin lispro (HumaLOG) corrective regimen sliding scale   SubCutaneous at bedtime  dextrose 5%. 1000 milliLiter(s) (50 mL/Hr) IV Continuous <Continuous>  dextrose 50% Injectable 12.5 Gram(s) IV Push once  dextrose 50% Injectable 25 Gram(s) IV Push once  dextrose 50% Injectable 25 Gram(s) IV Push once  influenza   Vaccine 0.5 milliLiter(s) IntraMuscular once    MEDICATIONS  (PRN):  dextrose Gel 1 Dose(s) Oral once PRN Blood Glucose LESS THAN 70 milliGRAM(s)/deciliter  glucagon  Injectable 1 milliGRAM(s) IntraMuscular once PRN Glucose LESS THAN 70 milligrams/deciliter  oxyCODONE    5 mG/acetaminophen 325 mG 1 Tablet(s) Oral every 6 hours PRN Severe Pain (7 - 10)    Allergies: No Known Allergies    Review of Systems (w/ reference to overnight/morning &/or time of interview):   General: no fevers, no chills, no night sweats  HEENT: no lacrimation  CVS: no chest pain, no palpitations  Resp: no shortness of breath  GI: no nausea, no vomiting; last bowel movement  was not bloody or black/tarry  MSK: leg pain 5/10, improved from 8.5/10  Neuro: no headache or dizziness      Vital Signs Last 24 Hrs  T(C): 36.7 (27 Sep 2017 11:05), Max: 36.8 (26 Sep 2017 21:33)  T(F): 98 (27 Sep 2017 11:05), Max: 98.3 (26 Sep 2017 21:33)  HR: 92 (27 Sep 2017 11:05) (82 - 95)  BP: 132/72 (27 Sep 2017 11:05) (127/59 - 149/89)  RR: 17 (27 Sep 2017 11:05) (16 - 20)  SpO2: 100% (27 Sep 2017 11:05) (100% - 100%)    Physical Exam:  General: no acute distress  Lymph: Swollen lymph nodes palpable in areas including: pre-auricular, submandibular, anterior and posterior cervical, supraclavicular, axillary  HEENT: pupils round, equal-sized  Cardio: regular rate and rhythm  Resp: bibasilar fine crackles  Abd: soft, non-tender  MSK: knees and calves non-tender bilaterally  Neuro: moves all four extremities  Psych: pleasant, coherent, goal-oriented    LABS:                        8.1    3.81  )-----------( 202      ( 27 Sep 2017 06:30 )             24.8     Complete Blood Count + Automated Diff (17 @ 06:30)    Mean Cell Volume: 77.7 fL    Auto Neutrophil #: 1.96 K/uL    Auto Lymphocyte #: 0.76 K/uL    Auto Eosinophil #: 0.55 K/uL    Auto Neutrophil %: 51.6 %    Auto Lymphocyte %: 19.9 %    Auto Eosinophil %: 14.4 %          139  |  107  |  55<H>  ----------------------------<  80  4.5   |  17<L>  |  4.30<H>    Ca    11.5<H>      27 Sep 2017 06:30    TPro  6.3  /  Alb  x   /  TBili  x   /  DBili  x   /  AST  x   /  ALT  x   /  AlkPhos  x       Sedimentation Rate, Erythrocyte: 40 mm/hr (17 @ 06:30)  C-Reactive Protein, Serum: 10.9 mg/L (17 @ 06:30)    Iron with Total Binding Capacity in AM (17 @ 06:30)    Iron Total, Serum: 36 ug/dL    Unsaturated Iron Binding Capacity: 166 ug/dL    Total Iron Binding Capacity.: 202 ug/dL  Lactate Dehydrogenase, Serum: 130 U/L (17 @ 06:30)  Haptoglobin, Serum: 98 mg/dL (17 @ 06:30)  Reticulocyte Count in AM (17 @ 06:30)    Reticulocyte Percent: 0.8 %    Absolute Reticulocytes: 0.000 10x6/uL  Immunoglobulin, Serum (17 @ 06:30)    Quantitative IgA: 400 mg/dL    Quantitative Ig mg/dL    Quantitative IgM: 99 mg/dL  Serum Protein Electrophoresis -- 2017 ----------------------------------------------------------                                                  Reference Range         Total Protein -  7.30                6.2 - 8.2 G/DL             Albumin   -  3.43                3.3 - 4.4 G/DL      Alpha-1 Globulin -  0.23             0.1 - 0.3 G/DL      Alpha-2 Globulin -  0.76             0.6 - 1.0 G/DL      Beta Globulins   -  0.76                0.6 - 1.1 G/DL      Gamma Globulins  -  2.12            0.7 - 1.7 G/DL      ASSESSMENT:        INCREASED POLYCLONAL GAMMA FRACTION SUGGESTIVE OF CHRONIC      INFLAMMATION OR IMMUNE REACTION.    --------------------------------------------------------------------------------------------    Urine Protein Electrophoresis -- 2017 ----------------------------------------------------------                                   Albumin  -  PRESENT      Alpha-1 Globulin  -  PRESENT      Alpha-2 Globulin  -  PRESENT      Beta Globulins    -  PRESENT      Gamma Globulins   -  PRESENT      Total Protein     -  22.7 MG/DL      Specimen Type     -  RANDOM URINE X100 CONC            BANDS SEEN IN GAMMA REGION      ASSESSMENT:        DISTINCT BANDS IN GAMMA REGION ARE NOTED. SERUM AND URINE IMMUNOFIXATION      ELECTROPHORESIS ARE RECOMMENDED IF NOT PREVIOUSLY PERFORMED.    --------------------------------------------------------------------------------------------    Urinalysis Basic - ( 26 Sep 2017 15:02 )    Color: PLYEL / Appearance: CLEAR / S.010 / pH: 6.5  Gluc: 50 / Ketone: NEGATIVE  / Bili: NEGATIVE / Urobili: NORMAL E.U.   Blood: NEGATIVE / Protein: 30 / Nitrite: NEGATIVE   Leuk Esterase: NEGATIVE / RBC: 0-2 / WBC 0-2   Sq Epi: OCC / Non Sq Epi: x / Bacteria: x      RADIOLOGY & ADDITIONAL TESTS:   Ultrasound of the Kidneys 2017  "IMPRESSION: No hydronephrosis. Findings compatible with medical renal disease."    CT Chest 2017  "LUNGS AND LARGE AIRWAYS: Patent central airways. Mild fibrosis and   traction bronchiectasis involving the right middle lobe, lingula and to a   lesser extent the periphery of both lower lobes. No pulmonary nodules.  PLEURA: No pleural effusion.  VESSELS: Within normal limits.  HEART: Heart size is normal. No pericardial effusion.  MEDIASTINUM AND SANJANA: 1.7 x 1.3 cm prevascular adenopathy. 2.4 x 1.7 cm   subcarinal adenopathy. Additional smaller paratracheal and bilateral   hilar lymph nodes.  CHEST WALL AND LOWER NECK: Bulky bilateral axillary and subcutaneous   pectoral adenopathy measuring up to 3.6 x 3.3 cm on the right (series 2,   image 27) and 3.2 x 2.3 cm on the left (series 2, image 29). Bilateral   supraclavicular adenopathy is also noted.  VISUALIZED UPPER ABDOMEN: Bulky upper abdominal adenopathy noted. Status   post cholecystectomy.  BONES: Within normal limits.    IMPRESSION: Bulky adenopathy in the chest and upper abdomen which can be   seen with chronic lymphocytic leukemia.   Mild bibasilar pulmonary fibrosis." LEONEL BEVERLY  [id number]    INTERVAL HPI/OVERNIGHT EVENTS:  Awake multiple times overnight; mostly feeling better than yesterday  she has been evaluated by renal, medicine, is awaiting surgery consult for bx    MEDICATIONS  (STANDING):  sodium chloride 0.9%. 1000 milliLiter(s) (200 mL/Hr) IV Continuous <Continuous>  insulin lispro (HumaLOG) corrective regimen sliding scale   SubCutaneous three times a day before meals  insulin lispro (HumaLOG) corrective regimen sliding scale   SubCutaneous at bedtime  dextrose 5%. 1000 milliLiter(s) (50 mL/Hr) IV Continuous <Continuous>  dextrose 50% Injectable 12.5 Gram(s) IV Push once  dextrose 50% Injectable 25 Gram(s) IV Push once  dextrose 50% Injectable 25 Gram(s) IV Push once  influenza   Vaccine 0.5 milliLiter(s) IntraMuscular once    MEDICATIONS  (PRN):  dextrose Gel 1 Dose(s) Oral once PRN Blood Glucose LESS THAN 70 milliGRAM(s)/deciliter  glucagon  Injectable 1 milliGRAM(s) IntraMuscular once PRN Glucose LESS THAN 70 milligrams/deciliter  oxyCODONE    5 mG/acetaminophen 325 mG 1 Tablet(s) Oral every 6 hours PRN Severe Pain (7 - 10)    Allergies: No Known Allergies    Review of Systems (w/ reference to overnight/morning &/or time of interview):   General: no fevers, no chills, no night sweats  HEENT: no lacrimation  CVS: no chest pain, no palpitations  Resp: no shortness of breath  GI: no nausea, no vomiting; last bowel movement  was not bloody or black/tarry  MSK: leg pain 5/10, improved from 8.5/10  Neuro: no headache or dizziness      Vital Signs Last 24 Hrs  T(C): 36.7 (27 Sep 2017 11:05), Max: 36.8 (26 Sep 2017 21:33)  T(F): 98 (27 Sep 2017 11:05), Max: 98.3 (26 Sep 2017 21:33)  HR: 92 (27 Sep 2017 11:05) (82 - 95)  BP: 132/72 (27 Sep 2017 11:05) (127/59 - 149/89)  RR: 17 (27 Sep 2017 11:05) (16 - 20)  SpO2: 100% (27 Sep 2017 11:05) (100% - 100%)    Physical Exam:  General: no acute distress  Lymph: Swollen lymph nodes palpable in areas including: pre-auricular, submandibular, anterior and posterior cervical, supraclavicular, axillary  HEENT: pupils round, equal-sized  Cardio: regular rate and rhythm  Resp: bibasilar fine crackles  Abd: soft, non-tender  MSK: knees and calves non-tender bilaterally  Neuro: moves all four extremities  Psych: pleasant, coherent, goal-oriented    LABS:                        8.1    3.81  )-----------( 202      ( 27 Sep 2017 06:30 )             24.8     Complete Blood Count + Automated Diff (17 @ 06:30)    Mean Cell Volume: 77.7 fL    Auto Neutrophil #: 1.96 K/uL    Auto Lymphocyte #: 0.76 K/uL    Auto Eosinophil #: 0.55 K/uL    Auto Neutrophil %: 51.6 %    Auto Lymphocyte %: 19.9 %    Auto Eosinophil %: 14.4 %          139  |  107  |  55<H>  ----------------------------<  80  4.5   |  17<L>  |  4.30<H>    Ca    11.5<H>      27 Sep 2017 06:30    TPro  6.3  /  Alb  x   /  TBili  x   /  DBili  x   /  AST  x   /  ALT  x   /  AlkPhos  x       Sedimentation Rate, Erythrocyte: 40 mm/hr (17 @ 06:30)  C-Reactive Protein, Serum: 10.9 mg/L (17 @ 06:30)    Iron with Total Binding Capacity in AM (17 @ 06:30)    Iron Total, Serum: 36 ug/dL    Unsaturated Iron Binding Capacity: 166 ug/dL    Total Iron Binding Capacity.: 202 ug/dL  Lactate Dehydrogenase, Serum: 130 U/L (17 @ 06:30)  Haptoglobin, Serum: 98 mg/dL (17 @ 06:30)  Reticulocyte Count in AM (17 @ 06:30)    Reticulocyte Percent: 0.8 %    Absolute Reticulocytes: 0.000 10x6/uL  Immunoglobulin, Serum (17 @ 06:30)    Quantitative IgA: 400 mg/dL    Quantitative Ig mg/dL    Quantitative IgM: 99 mg/dL  Serum Protein Electrophoresis -- 2017 ----------------------------------------------------------                                                  Reference Range         Total Protein -  7.30                6.2 - 8.2 G/DL             Albumin   -  3.43                3.3 - 4.4 G/DL      Alpha-1 Globulin -  0.23             0.1 - 0.3 G/DL      Alpha-2 Globulin -  0.76             0.6 - 1.0 G/DL      Beta Globulins   -  0.76                0.6 - 1.1 G/DL      Gamma Globulins  -  2.12            0.7 - 1.7 G/DL      ASSESSMENT:        INCREASED POLYCLONAL GAMMA FRACTION SUGGESTIVE OF CHRONIC      INFLAMMATION OR IMMUNE REACTION.    --------------------------------------------------------------------------------------------    Urine Protein Electrophoresis -- 2017 ----------------------------------------------------------                                   Albumin  -  PRESENT      Alpha-1 Globulin  -  PRESENT      Alpha-2 Globulin  -  PRESENT      Beta Globulins    -  PRESENT      Gamma Globulins   -  PRESENT      Total Protein     -  22.7 MG/DL      Specimen Type     -  RANDOM URINE X100 CONC            BANDS SEEN IN GAMMA REGION      ASSESSMENT:        DISTINCT BANDS IN GAMMA REGION ARE NOTED. SERUM AND URINE IMMUNOFIXATION      ELECTROPHORESIS ARE RECOMMENDED IF NOT PREVIOUSLY PERFORMED.    --------------------------------------------------------------------------------------------    Urinalysis Basic - ( 26 Sep 2017 15:02 )    Color: PLYEL / Appearance: CLEAR / S.010 / pH: 6.5  Gluc: 50 / Ketone: NEGATIVE  / Bili: NEGATIVE / Urobili: NORMAL E.U.   Blood: NEGATIVE / Protein: 30 / Nitrite: NEGATIVE   Leuk Esterase: NEGATIVE / RBC: 0-2 / WBC 0-2   Sq Epi: OCC / Non Sq Epi: x / Bacteria: x      RADIOLOGY & ADDITIONAL TESTS:   Ultrasound of the Kidneys 2017  "IMPRESSION: No hydronephrosis. Findings compatible with medical renal disease."    CT Chest 2017  "LUNGS AND LARGE AIRWAYS: Patent central airways. Mild fibrosis and   traction bronchiectasis involving the right middle lobe, lingula and to a   lesser extent the periphery of both lower lobes. No pulmonary nodules.  PLEURA: No pleural effusion.  VESSELS: Within normal limits.  HEART: Heart size is normal. No pericardial effusion.  MEDIASTINUM AND SANJANA: 1.7 x 1.3 cm prevascular adenopathy. 2.4 x 1.7 cm   subcarinal adenopathy. Additional smaller paratracheal and bilateral   hilar lymph nodes.  CHEST WALL AND LOWER NECK: Bulky bilateral axillary and subcutaneous   pectoral adenopathy measuring up to 3.6 x 3.3 cm on the right (series 2,   image 27) and 3.2 x 2.3 cm on the left (series 2, image 29). Bilateral   supraclavicular adenopathy is also noted.  VISUALIZED UPPER ABDOMEN: Bulky upper abdominal adenopathy noted. Status   post cholecystectomy.  BONES: Within normal limits.    IMPRESSION: Bulky adenopathy in the chest and upper abdomen which can be   seen with chronic lymphocytic leukemia.   Mild bibasilar pulmonary fibrosis."

## 2017-09-27 NOTE — PROGRESS NOTE ADULT - SUBJECTIVE AND OBJECTIVE BOX
LEONEL BEVERLY  47y  Female      Patient is a 47y old  Female who presents with a chief complaint of High calcium and my kidney sent by doctor (26 Sep 2017 16:12)      INTERVAL HPI/OVERNIGHT EVENTS:    Pt reports difficulty with sleep, due to lab and noise disturbances. She has an 8.5/10 headache as a result. States pain in legs is much better, with only brief intermittent episodes of cramping. Walked to bathroom to urinate several times throughout the night without difficulty. Urine color and appearance is unchanged, has "bubbles." Only concern other than headache is when she can shower. No other concerns.    REVIEW OF SYSTEMS:  CONSTITUTIONAL: No fever, night sweats overnight. +some fatigue  EYES: No eye pain, visual disturbances, or discharge  ENMT:  No difficulty hearing, tinnitus, vertigo; No sinus or throat pain  NECK: No pain or stiffness  RESPIRATORY: No wheezing, chills or hemoptysis; +cough  CARDIOVASCULAR: No chest pain, palpitations, dizziness, or leg swelling  GASTROINTESTINAL: No abdominal or epigastric pain. No diarrhea or constipation. No nausea, vomiting, or hematemesis. No melena or hematochezia.  GENITOURINARY: No dysuria, hematuria, or incontinence  NEUROLOGICAL: No memory loss, loss of strength, numbness, or tremors. +headaches  MUSCULOSKELETAL: No joint pain or swelling; No muscle, back, or extremity pain  SKIN: No new itching, burning, rashes, or lesions   LYMPH NODES: +axillary and paratracheal   ENDOCRINE: No heat or cold intolerance; No hair loss  PSYCHIATRIC: No depression, anxiety, mood swings. +difficulty sleeping  HEME/LYMPH: No easy bruising, or bleeding gums  ALLERY AND IMMUNOLOGIC: No hives or eczema    T(C): 36.7 (17 @ 06:04), Max: 37.1 (17 @ 08:30)  HR: 95 (17 @ 06:04) (80 - 95)  BP: 130/84 (17 @ 06:04) (127/59 - 149/89)  RR: 16 (17 @ 06:04) (16 - 20)  SpO2: 100% (17 @ 06:04) (100% - 100%)  Wt(kg): --Vital Signs Last 24 Hrs  T(C): 36.7 (27 Sep 2017 06:04), Max: 37.1 (26 Sep 2017 08:30)  T(F): 98.1 (27 Sep 2017 06:04), Max: 98.7 (26 Sep 2017 08:30)  HR: 95 (27 Sep 2017 06:04) (80 - 95)  BP: 130/84 (27 Sep 2017 06:04) (127/59 - 149/89)  BP(mean): --  RR: 16 (27 Sep 2017 06:04) (16 - 20)  SpO2: 100% (27 Sep 2017 06:04) (100% - 100%)  Wt(kg): --    PHYSICAL EXAM:  GENERAL: NAD, well-groomed, well-developed  HEAD:  Atraumatic, Normocephalic  EYES: EOMI, PERRLA, conjunctiva and sclera clear  ENMT: Moist mucous membranes, Good dentition, No lesions  NECK: Supple, No JVD, Normal thyroid  NERVOUS SYSTEM:  Alert & Oriented X3, Good concentration; Motor Strength 5/5 B/L upper and lower extremities; DTRs 2+ intact and symmetric  CHEST/LUNG: Clear to auscultation bilaterally; No rales, rhonchi, wheezing, or rubs  HEART: Regular rate and rhythm; No rubs, or gallops  ABDOMEN: Soft, Nontender, Nondistended; Bowel sounds present  EXTREMITIES:  2+ Peripheral Pulses, No clubbing, cyanosis, or edema  SKIN: palpable erythematous nodule on RUE. Postinflammatory hyperpigmentation noted on right thigh and left lower extremity from previous dimitry.         Consultant(s) Notes Reviewed:  [x ] YES  [ ] NO  Care Discussed with Consultants/Other Providers [ x] YES  [ ] NO    LABS:                        8.6    4.33  )-----------( 195      ( 26 Sep 2017 09:09 )             26.5     09-    138  |  105  |  59<H>  ----------------------------<  122<H>  5.0   |  20<L>  |  4.61<H>    Ca    11.9<H>      27 Sep 2017 00:40    TPro  7.3  /  Alb  x   /  TBili  x   /  DBili  x   /  AST  x   /  ALT  x   /  AlkPhos  x         Urinalysis Basic - ( 26 Sep 2017 15:02 )    Color: PLYEL / Appearance: CLEAR / S.010 / pH: 6.5  Gluc: 50 / Ketone: NEGATIVE  / Bili: NEGATIVE / Urobili: NORMAL E.U.   Blood: NEGATIVE / Protein: 30 / Nitrite: NEGATIVE   Leuk Esterase: NEGATIVE / RBC: 0-2 / WBC 0-2   Sq Epi: OCC / Non Sq Epi: x / Bacteria: x      CAPILLARY BLOOD GLUCOSE  140 (26 Sep 2017 21:33)  102 (26 Sep 2017 17:38)  140 (26 Sep 2017 13:37)  105 (26 Sep 2017 12:24)  99 (26 Sep 2017 08:30)            Urinalysis Basic - ( 26 Sep 2017 15:02 )    Color: PLYEL / Appearance: CLEAR / S.010 / pH: 6.5  Gluc: 50 / Ketone: NEGATIVE  / Bili: NEGATIVE / Urobili: NORMAL E.U.   Blood: NEGATIVE / Protein: 30 / Nitrite: NEGATIVE   Leuk Esterase: NEGATIVE / RBC: 0-2 / WBC 0-2   Sq Epi: OCC / Non Sq Epi: x / Bacteria: x        RADIOLOGY & ADDITIONAL TESTS:    US Renal: consistent with medical renal disease.  CT chest: Bulky adenopathy in the chest and upper abdomen which can be seen with chronic lymphocytic leukemia. Mild bibasilar pulmonary fibrosis.    Imaging Personally Reviewed:  [X] YES  [ ] NO LEONEL BEVERLY  47y  Female      Patient is a 47y old  Female who presents with a chief complaint of High calcium and my kidney sent by doctor (26 Sep 2017 16:12)      INTERVAL HPI/OVERNIGHT EVENTS:    Pt reports difficulty with sleep, due to lab and noise disturbances. She has an 8.5/10 headache as a result. States pain in legs is much better, with only brief intermittent episodes of cramping. Walked to bathroom to urinate several times throughout the night without difficulty. Urine color and appearance is unchanged, has "bubbles." Only concern other than headache is when she can shower. Denies CP, abdominal pain.    REVIEW OF SYSTEMS:  CONSTITUTIONAL: No fever, night sweats overnight. +some fatigue  EYES: No eye pain, visual disturbances, or discharge  ENMT:  No difficulty hearing, tinnitus, vertigo; No sinus or throat pain  NECK: No pain or stiffness  RESPIRATORY: No wheezing, chills or hemoptysis; +cough  CARDIOVASCULAR: No chest pain, palpitations, dizziness, or leg swelling  GASTROINTESTINAL: No abdominal or epigastric pain. No diarrhea or constipation. No nausea, vomiting, or hematemesis. No melena or hematochezia.  GENITOURINARY: No dysuria, hematuria, or incontinence  NEUROLOGICAL: No memory loss, loss of strength, numbness, or tremors. +headaches  MUSCULOSKELETAL: No joint pain or swelling; No muscle, back, or extremity pain  SKIN: No new itching, burning, rashes, or lesions   LYMPH NODES: +axillary and paratracheal   ENDOCRINE: No heat or cold intolerance; No hair loss  PSYCHIATRIC: No depression, anxiety, mood swings. +difficulty sleeping  HEME/LYMPH: No easy bruising, or bleeding gums  ALLERY AND IMMUNOLOGIC: No hives or eczema    T(C): 36.7 (17 @ 06:04), Max: 37.1 (17 @ 08:30)  HR: 95 (17 @ 06:04) (80 - 95)  BP: 130/84 (17 @ 06:04) (127/59 - 149/89)  RR: 16 (17 @ 06:04) (16 - 20)  SpO2: 100% (17 @ 06:04) (100% - 100%)  Wt(kg): --Vital Signs Last 24 Hrs  T(C): 36.7 (27 Sep 2017 06:04), Max: 37.1 (26 Sep 2017 08:30)  T(F): 98.1 (27 Sep 2017 06:04), Max: 98.7 (26 Sep 2017 08:30)  HR: 95 (27 Sep 2017 06:04) (80 - 95)  BP: 130/84 (27 Sep 2017 06:04) (127/59 - 149/89)  BP(mean): --  RR: 16 (27 Sep 2017 06:04) (16 - 20)  SpO2: 100% (27 Sep 2017 06:04) (100% - 100%)  Wt(kg): --    PHYSICAL EXAM:  GENERAL: NAD, well-groomed, well-developed  HEAD:  Atraumatic, Normocephalic  EYES: EOMI, PERRLA, conjunctiva and sclera clear  ENMT: Moist mucous membranes, Good dentition, No lesions  NECK: Supple, No JVD, Normal thyroid  NERVOUS SYSTEM:  Alert & Oriented X3, Good concentration; Motor Strength 5/5 B/L upper and lower extremities; DTRs 2+ intact and symmetric  CHEST/LUNG: Clear to auscultation bilaterally; No rales, rhonchi, wheezing, or rubs  HEART: Regular rate and rhythm; No rubs, or gallops  ABDOMEN: Soft, Nontender, Nondistended; Bowel sounds present  EXTREMITIES:  2+ Peripheral Pulses, No clubbing, cyanosis, or edema  SKIN: palpable erythematous nodule on RUE. Postinflammatory hyperpigmentation noted on right thigh and left lower extremity from previous dimitry.         Consultant(s) Notes Reviewed:  [x ] YES  [ ] NO  Care Discussed with Consultants/Other Providers [ x] YES  [ ] NO    LABS:                        8.6    4.33  )-----------( 195      ( 26 Sep 2017 09:09 )             26.5     09-    138  |  105  |  59<H>  ----------------------------<  122<H>  5.0   |  20<L>  |  4.61<H>    Ca    11.9<H>      27 Sep 2017 00:40    TPro  7.3  /  Alb  x   /  TBili  x   /  DBili  x   /  AST  x   /  ALT  x   /  AlkPhos  x         Urinalysis Basic - ( 26 Sep 2017 15:02 )    Color: PLYEL / Appearance: CLEAR / S.010 / pH: 6.5  Gluc: 50 / Ketone: NEGATIVE  / Bili: NEGATIVE / Urobili: NORMAL E.U.   Blood: NEGATIVE / Protein: 30 / Nitrite: NEGATIVE   Leuk Esterase: NEGATIVE / RBC: 0-2 / WBC 0-2   Sq Epi: OCC / Non Sq Epi: x / Bacteria: x      CAPILLARY BLOOD GLUCOSE  140 (26 Sep 2017 21:33)  102 (26 Sep 2017 17:38)  140 (26 Sep 2017 13:37)  105 (26 Sep 2017 12:24)  99 (26 Sep 2017 08:30)            Urinalysis Basic - ( 26 Sep 2017 15:02 )    Color: PLYEL / Appearance: CLEAR / S.010 / pH: 6.5  Gluc: 50 / Ketone: NEGATIVE  / Bili: NEGATIVE / Urobili: NORMAL E.U.   Blood: NEGATIVE / Protein: 30 / Nitrite: NEGATIVE   Leuk Esterase: NEGATIVE / RBC: 0-2 / WBC 0-2   Sq Epi: OCC / Non Sq Epi: x / Bacteria: x        RADIOLOGY & ADDITIONAL TESTS:    US Renal: consistent with medical renal disease.  CT chest: Bulky adenopathy in the chest and upper abdomen which can be seen with chronic lymphocytic leukemia. Mild bibasilar pulmonary fibrosis.    Imaging Personally Reviewed:  [X] YES  [ ] NO

## 2017-09-27 NOTE — PROGRESS NOTE ADULT - PROBLEM SELECTOR PLAN 1
Pt. with most likely hemodynamically mediated MIKALA in setting of hypercalcemia and recent NSAID use. Scr improved to 4.6 today from 5.2 yesterday. Patient reports voiding well. Continue with IVF as tolerated. Monitor labs and urine output. Avoid nephrotoxins, NSAIDs, and RCA

## 2017-09-27 NOTE — PROGRESS NOTE ADULT - ASSESSMENT
47-year-old female with known history of sarcoidosis, DM presented to [facility] with complaints of weakness, back pain and LE pain. Pt. found to have MIKALA and hypercalcemia.

## 2017-09-27 NOTE — PROGRESS NOTE ADULT - ASSESSMENT
47 y.o F with pmh sarcoidosis (untreated for past 3 years), DMII and nephrolithiasis, p/w high Ca and increasing Cr from pulmonologists office.

## 2017-09-27 NOTE — CONSULT NOTE ADULT - ASSESSMENT
47F with history of sarcoidosis presenting with 1 month history of malaise, fevers/chills, SOB/cough, unintentional weight loss, previous history of lymphadenopathy s/p prior LN biopsy (granulomatous lymphadenitis)  - with concern for lymphoma/leukemia, recommend med onc consultation  - given bulky lymphadenopathy, would first recommend core needle biopsy  - if nondiagnostic, would be available to perform excisional biopsy  - discussed with attending Phyllis Chow [employer] surgery [id number]

## 2017-09-27 NOTE — PROGRESS NOTE ADULT - ASSESSMENT
47 y.o F with pmh sarcoidosis (untreated for past 3 years), DMII and nephrolithiasis, p/w high Ca and increasing Cr from pulmonologists office, c/o BLE pain x3-4d, LBP, dizziness and nausea. Vitals wnL, no cardiac complaints. Exam as above.

## 2017-09-27 NOTE — PROGRESS NOTE ADULT - PROBLEM SELECTOR PLAN 1
Pt presenting with hypercalcemia in the setting of known sarcoidosis. Differential includes Sarcoidosis vs malignancy vs Vit D intoxication s/p 2L NS bolus. Calcium at 11.9 down from 15.3. PTH is low, calcidiol is low, and calcitriol is high. Hypercalcemia 2/2 calcitriol from granulomas from Sarcoidosis vs PTHrP from lymphoma  -PTHrP  -NS @200cc/hr  -Strict I/Os  -Consider calcitonin though pt's calcium has improved significantly with IV fluids.

## 2017-09-27 NOTE — PROGRESS NOTE ADULT - PROBLEM SELECTOR PLAN 2
differential includes Jose 2/2 malignancy(myeloma) vs prerenal 2/2 hypercalcemia vs NSAID induced nephropathy. Renal Sono showing medical renal disease  -SPEP/UPEP,Immunofixation,FLC  -Strict I/OS  -Nephrology consult appreciated   -Hold nephrotoxins

## 2017-09-27 NOTE — PROGRESS NOTE ADULT - PROBLEM SELECTOR PLAN 3
Pt. with hypercalcemia in setting of sarcoidosis. Serum calcium decreased to 11.9 today from 12.8 yesterday.  Continue IVF. Monitor serum calcium Pt. with hypercalcemia in setting of sarcoidosis. Serum calcium decreased to 11.9 today from 12.8 yesterday. Continue IVF. Monitor serum calcium

## 2017-09-27 NOTE — PROGRESS NOTE ADULT - SUBJECTIVE AND OBJECTIVE BOX
[facility] DIVISION OF KIDNEY DISEASES AND HYPERTENSION -- [phone]   FOLLOW UP NOTE  --------------------------------------------------------------------------------  HPI:  47-year-old female with known history of sarcoidosis, DM presented to [facility] with complaints of weakness, back pain and LE pain. Pt. found to have MIKALA and hypercalcemia. Pt currently on IVF with improvement in serum creatinine and hypercalcemia. PT seen and examined at bedside. Pt reports improvement in SOB. PT denies chest pain, LE edema.     PAST HISTORY  --------------------------------------------------------------------------------  No significant changes to PMH, PSH, FHx, SHx, unless otherwise noted    ALLERGIES & MEDICATIONS  --------------------------------------------------------------------------------  Allergies    No Known Allergies    Intolerances      Standing Inpatient Medications  sodium chloride 0.9%. 1000 milliLiter(s) IV Continuous <Continuous>  insulin lispro (HumaLOG) corrective regimen sliding scale   SubCutaneous three times a day before meals  insulin lispro (HumaLOG) corrective regimen sliding scale   SubCutaneous at bedtime  dextrose 5%. 1000 milliLiter(s) IV Continuous <Continuous>  dextrose 50% Injectable 12.5 Gram(s) IV Push once  dextrose 50% Injectable 25 Gram(s) IV Push once  dextrose 50% Injectable 25 Gram(s) IV Push once  influenza   Vaccine 0.5 milliLiter(s) IntraMuscular once    PRN Inpatient Medications  dextrose Gel 1 Dose(s) Oral once PRN  glucagon  Injectable 1 milliGRAM(s) IntraMuscular once PRN  oxyCODONE    5 mG/acetaminophen 325 mG 1 Tablet(s) Oral every 6 hours PRN      REVIEW OF SYSTEMS  --------------------------------------------------------------------------------  General: no fever  CVS: no chest pain  RESP: improved  sob, no cough  ABD: no abdominal pain  : no dysuria,  MSK: no edema     VITALS/PHYSICAL EXAM  --------------------------------------------------------------------------------  T(C): 36.7 (09-27-17 @ 06:04), Max: 37.1 (09-26-17 @ 13:16)  HR: 95 (09-27-17 @ 06:04) (82 - 95)  BP: 130/84 (09-27-17 @ 06:04) (127/59 - 149/89)  RR: 16 (09-27-17 @ 06:04) (16 - 20)  SpO2: 100% (09-27-17 @ 06:04) (100% - 100%)  Wt(kg): --        Physical Exam:  	Gen: NAD  	HEENT: MMM  	Pulm: CTA B/L  	CV: S1S2  	Abd: Soft, +BS   	Ext: No LE edema B/L  	Neuro: Awake, alert  	Skin: Warm and dry    LABS/STUDIES  --------------------------------------------------------------------------------              8.6    4.33  >-----------<  195      [09-26-17 @ 09:09]              26.5     138  |  105  |  59  ----------------------------<  122      [09-27-17 @ 00:40]  5.0   |  20  |  4.61        Ca     11.9     [09-27-17 @ 00:40]    TPro  6.3  /  Alb  x   /  TBili  x   /  DBili  x   /  AST  x   /  ALT  x   /  AlkPhos  x   [09-27-17 @ 06:30]        CK 10      [09-27-17 @ 06:30]        [09-27-17 @ 06:30]    Creatinine Trend:  SCr 4.61 [09-27 @ 00:40]  SCr 5.21 [09-26 @ 07:35]  SCr 5.65 [09-25 @ 23:40]    Urinalysis - [09-26-17 @ 15:02]      Color PLYEL / Appearance CLEAR / SG 1.010 / pH 6.5      Gluc 50 / Ketone NEGATIVE  / Bili NEGATIVE / Urobili NORMAL       Blood NEGATIVE / Protein 30 / Leuk Est NEGATIVE / Nitrite NEGATIVE      RBC 0-2 / WBC 0-2 / Hyaline  / Gran  / Sq Epi OCC / Non Sq Epi  / Bacteria     Urine Creatinine 35.32      [09-26-17 @ 08:34]  Urine Protein 22.7      [09-26-17 @ 09:21]  Urine Sodium 102      [09-26-17 @ 08:34]    Iron 36, TIBC 202, %sat --      [09-27-17 @ 06:30]  Ferritin 112.8      [09-27-17 @ 06:30]  PTH 8.12 (Ca --)      [09-26-17 @ 07:35]   --  Vitamin D (25OH) 12.3      [09-26-17 @ 07:35]  HbA1c 6.9      [09-27-17 @ 06:30] [facility] DIVISION OF KIDNEY DISEASES AND HYPERTENSION -- [phone]   FOLLOW UP NOTE  --------------------------------------------------------------------------------  HPI: 47-year-old female with known history of sarcoidosis, DM presented to [facility] with complaints of weakness, back pain and LE pain. Pt. found to have MIKALA and hypercalcemia. Pt currently on IVF with improvement in serum creatinine and calcium levels. Pt. seen and examined at bedside. Pt. feels better with improvement in SOB. Pt. denies chest pain, LE edema.     PAST HISTORY  --------------------------------------------------------------------------------  No significant changes to PMH, PSH, FHx, SHx, unless otherwise noted    ALLERGIES & MEDICATIONS  --------------------------------------------------------------------------------  Allergies    No Known Allergies    Intolerances      Standing Inpatient Medications  sodium chloride 0.9%. 1000 milliLiter(s) IV Continuous <Continuous>  insulin lispro (HumaLOG) corrective regimen sliding scale   SubCutaneous three times a day before meals  insulin lispro (HumaLOG) corrective regimen sliding scale   SubCutaneous at bedtime  dextrose 5%. 1000 milliLiter(s) IV Continuous <Continuous>  dextrose 50% Injectable 12.5 Gram(s) IV Push once  dextrose 50% Injectable 25 Gram(s) IV Push once  dextrose 50% Injectable 25 Gram(s) IV Push once  influenza   Vaccine 0.5 milliLiter(s) IntraMuscular once    PRN Inpatient Medications  dextrose Gel 1 Dose(s) Oral once PRN  glucagon  Injectable 1 milliGRAM(s) IntraMuscular once PRN  oxyCODONE    5 mG/acetaminophen 325 mG 1 Tablet(s) Oral every 6 hours PRN      REVIEW OF SYSTEMS  --------------------------------------------------------------------------------  General: no fever  CVS: no chest pain  RESP: improved SOB, no cough  ABD: no abdominal pain  : no dysuria,  MSK: no edema     VITALS/PHYSICAL EXAM  --------------------------------------------------------------------------------  T(C): 36.7 (09-27-17 @ 06:04), Max: 37.1 (09-26-17 @ 13:16)  HR: 95 (09-27-17 @ 06:04) (82 - 95)  BP: 130/84 (09-27-17 @ 06:04) (127/59 - 149/89)  RR: 16 (09-27-17 @ 06:04) (16 - 20)  SpO2: 100% (09-27-17 @ 06:04) (100% - 100%)  Wt(kg): --    Physical Exam:  	Gen: NAD  	HEENT: MMM  	Pulm: CTA B/L  	CV: S1S2  	Abd: Soft, +BS   	Ext: No LE edema B/L  	Neuro: Awake, alert  	Skin: Warm and dry    LABS/STUDIES  --------------------------------------------------------------------------------              8.6    4.33  >-----------<  195      [09-26-17 @ 09:09]              26.5     138  |  105  |  59  ----------------------------<  122      [09-27-17 @ 00:40]  5.0   |  20  |  4.61        Ca     11.9     [09-27-17 @ 00:40]    TPro  6.3  /  Alb  x   /  TBili  x   /  DBili  x   /  AST  x   /  ALT  x   /  AlkPhos  x   [09-27-17 @ 06:30]    CK 10      [09-27-17 @ 06:30]        [09-27-17 @ 06:30]    Creatinine Trend:  SCr 4.61 [09-27 @ 00:40]  SCr 5.21 [09-26 @ 07:35]  SCr 5.65 [09-25 @ 23:40]    Urinalysis - [09-26-17 @ 15:02]      Color PLYEL / Appearance CLEAR / SG 1.010 / pH 6.5      Gluc 50 / Ketone NEGATIVE  / Bili NEGATIVE / Urobili NORMAL       Blood NEGATIVE / Protein 30 / Leuk Est NEGATIVE / Nitrite NEGATIVE      RBC 0-2 / WBC 0-2 / Hyaline  / Gran  / Sq Epi OCC / Non Sq Epi  / Bacteria     Urine Creatinine 35.32      [09-26-17 @ 08:34]  Urine Protein 22.7      [09-26-17 @ 09:21]  Urine Sodium 102      [09-26-17 @ 08:34]    Iron 36, TIBC 202, %sat --      [09-27-17 @ 06:30]  Ferritin 112.8      [09-27-17 @ 06:30]  PTH 8.12 (Ca --)      [09-26-17 @ 07:35]   --  Vitamin D (25OH) 12.3      [09-26-17 @ 07:35]  HbA1c 6.9      [09-27-17 @ 06:30]

## 2017-09-27 NOTE — CHART NOTE - NSCHARTNOTEFT_GEN_A_CORE
Excerpts from outpatient records faxed from [facility] (phone [phone], alt phone [phone], fax [phone]; courtesy Randi Burger NP)    Biopsy of Lymph Node, Right Groin -- collected 2014-12-02  "Gross Description: Comments The specimen [...] consists of one piece of yellowish tan lymph node measuring, in cm. 0.8 x 0.6 x 0.3. The specimen is bisected and entirely submitted in one cassette(s)."  "Clinical Diagnosis: Granulomatous lymphadenitis  Comments: Stains for acid fast and fungal organisms are negative."  "Clinical Information: Pt. with diffuse lymphadenopathy and weight loss, chills and night sweats Right groin matted nodes"    Immunofixation, Serum (2017-09-22) "No monoclonal proteins detected"    Quantiferon-TB Gold Serum (2017-09-22) Negative    Complete Blood Count (2017-09-22) WBC 5.3 (Eos 7.7%) / Hgb 10.9 / Hct 32.4 / MCV 78.1 / Plt 234    Comprehensive Metabolic Panel (2017-09-22) Na 135 / K 4.6 / Cl 96 / CO2 29 / Cr 3.71 / BUN 15 / Glc 167 / Ca >15   TProt 8.6 / Alb 4.2 / TGlobulin 4.4 / TBili 0.5 / AST 13 / ALT 7 / Alk Ph 39    Sed Rate (2017-09-22) 87  C-reactive Protein (2017-08-22) 7.4    HB Core Ab, Total (2017-09-22) Non Reactive  Hepatitis Panel, Acute (2017-09-22)   - HAV AB (IgM) Nonreactive  - HB S Ag Non Reactive  - HB Core AB (IgM) Non Reactive  - HCV Ratio - [facility] 0.09  - Hepatits C Ab Non Reactive    Angiotensin Converting Enzyme (2017-09-22) 357 Excerpts from outpatient records faxed from [facility] (phone [phone], alt phone [phone], fax [phone]; courtesy Randi Burger NP)  - Topmost results are from lymph-node biopsy in 12/2014  - Lower results are from recent lab values (9/2017)  - Complete fax placed in patient's binder    Biopsy of Lymph Node, Right Groin -- collected 2014-12-02  "Gross Description: Comments The specimen [...] consists of one piece of yellowish tan lymph node measuring, in cm. 0.8 x 0.6 x 0.3. The specimen is bisected and entirely submitted in one cassette(s)."  "Clinical Diagnosis: Granulomatous lymphadenitis  Comments: Stains for acid fast and fungal organisms are negative."  "Clinical Information: Pt. with diffuse lymphadenopathy and weight loss, chills and night sweats Right groin matted nodes"      Complete Blood Count (2017-09-22)   - WBC 5.3 (Eos 7.7%) / Hgb 10.9 / Hct 32.4 / MCV 78.1 / Plt 234    Comprehensive Metabolic Panel (2017-09-22)   - Na 135 / K 4.6 / Cl 96 / CO2 29 / Cr 3.71 / BUN 15 / Glc 167 / Ca >15   --- TProt 8.6 / Alb 4.2 / TGlobulin 4.4 / TBili 0.5 / AST 13 / ALT 7 / Alk Ph 39    Quantiferon-TB Gold Serum (2017-09-22)   - Negative  - Nil 0.22  - TB Antigen minus Nil <0.00  - Mitogen minus Nil 0.60    Sed Rate (2017-09-22) 87  C-reactive Protein (2017-09-22) 7.4    HB Core Ab, Total (2017-09-22) Non Reactive  Hepatitis Panel, Acute (2017-09-22)   - HAV AB (IgM) Nonreactive  - HB S Ag Non Reactive  - HB Core AB (IgM) Non Reactive  - HCV Ratio - [facility] 0.09  - Hepatits C Ab Non Reactive    Angiotensin Converting Enzyme (2017-09-22) 357    Protein Electrophoresis, Serum (2017-09-22)  - Protein total 8.6 (6.1-8.1)  - Gamma globulin 2.3 (0.8-1.7)    Immunofixation, Serum (2017-09-22) "No monoclonal proteins detected"    Immunofixation and Protein Electrophoresis, Random Urine (2017-09-22)  - Creatinine, Random Urine 84 ()  - Urine Total Protein 1031 ()  - Prot/Creat Ratio, Ur  (1233)  - Protein Fractions, Urine  --- Comments: Pattern consistent with glomerular proteinuria  --- Albumin 19.7%  --- Alpha-1 globulin 6.1%  --- Alpha-2 globulin 14.8%  --- Beta globulin 19.8%  --- Gamma globulin 39.6%  - A/G Ratio 0.25  - Immunofixation, Urine Not Detected  - Comments: No monoclonal proteins detected    High-Sensitivity CRP (2017-08-15) 9.5    Hemoglobin A1c (2017-08-15) 7.8    Lipid Panel (2017-08-15)  --- Total Cholesterol 198 (125-200)  --- HDL Cholesterol 22 (>=46)  --- Cholesterol/HDL Ratio 9.0 (<5.0)  --- LDL Cholesterol, calculated 119 (<130)  --- Triglycerides 287 (<150)    Urinalysis Complete (2017-08-15)  --- Appearance cloudy / Protein 1+ / Nitrite negative / Leuk Est 1+ / WBC 10-20 / Bacteria Few (10-25) / Hyaline casts occasional / Calcium Oxalate Crystals identified    Thyroid Studies (2017-08-15)  --- TSH 4.83 (0.4-4.50)  --- Free T4 1.4 (0.8-1.8)

## 2017-09-27 NOTE — PROGRESS NOTE ADULT - PROBLEM SELECTOR PLAN 2
differential includes Jose 2/2 malignancy(myeloma) vs prerenal 2/2 hypercalcemia vs NSAID induced nephropathy.   -Renal Sono consistent with medical renal disease.  -SPEP/UPEP,Immunofixation,FLC  -Strict I/OS  -Nephrology consult   -Hold nephrotixins

## 2017-09-27 NOTE — PROGRESS NOTE ADULT - PROBLEM SELECTOR PLAN 2
Pt. with mild hyperkalemia in setting of renal failure. Serum potassium improved to 5.0 today. Low potassium diet. Monitor serum potassium

## 2017-09-27 NOTE — PROGRESS NOTE ADULT - PROBLEM SELECTOR PLAN 6
Pt has been off treatment for over 3 years because she has not followed up with her PCP.   -Appreciate rheum consult   -Obtain home meds   -Talk to outpatient PCP

## 2017-09-27 NOTE — PROGRESS NOTE ADULT - SUBJECTIVE AND OBJECTIVE BOX
Patient is a 47y old  Female who presents with a chief complaint of High calcium and my kidney sent by doctor (26 Sep 2017 16:12)        SUBJECTIVE / OVERNIGHT EVENTS:No acute events overnight. No events on telemetry      MEDICATIONS  (STANDING):  sodium chloride 0.9%. 1000 milliLiter(s) (200 mL/Hr) IV Continuous <Continuous>  insulin lispro (HumaLOG) corrective regimen sliding scale   SubCutaneous three times a day before meals  insulin lispro (HumaLOG) corrective regimen sliding scale   SubCutaneous at bedtime  dextrose 5%. 1000 milliLiter(s) (50 mL/Hr) IV Continuous <Continuous>  dextrose 50% Injectable 12.5 Gram(s) IV Push once  dextrose 50% Injectable 25 Gram(s) IV Push once  dextrose 50% Injectable 25 Gram(s) IV Push once  influenza   Vaccine 0.5 milliLiter(s) IntraMuscular once    MEDICATIONS  (PRN):  dextrose Gel 1 Dose(s) Oral once PRN Blood Glucose LESS THAN 70 milliGRAM(s)/deciliter  glucagon  Injectable 1 milliGRAM(s) IntraMuscular once PRN Glucose LESS THAN 70 milligrams/deciliter  oxyCODONE    5 mG/acetaminophen 325 mG 1 Tablet(s) Oral every 6 hours PRN Severe Pain (7 - 10)        CAPILLARY BLOOD GLUCOSE  89 (27 Sep 2017 08:25)  140 (26 Sep 2017 21:33)  102 (26 Sep 2017 17:38)  140 (26 Sep 2017 13:37)  105 (26 Sep 2017 12:24)        I&O's Summary      PHYSICAL EXAM  GENERAL: NAD, well-developed  HEAD:  Atraumatic, Normocephalic  EYES: EOMI, PERRLA, conjunctiva and sclera clear  NECK: Supple, No JVD  CHEST/LUNG: Clear to auscultation bilaterally; No wheeze  HEART: Regular rate and rhythm; No murmurs, rubs, or gallops  ABDOMEN: Soft, Nontender, Nondistended; Bowel sounds present  EXTREMITIES:  2+ Peripheral Pulses, No clubbing, cyanosis, or edema. B/l axillary lymphadenopathy  PSYCH: AAOx3  SKIN: No rashes or lesions    LABS:                        8.6    4.33  )-----------( 195      ( 26 Sep 2017 09:09 )             26.5     09-27    138  |  105  |  59<H>  ----------------------------<  122<H>  5.0   |  20<L>  |  4.61<H>    Ca    11.9<H>      27 Sep 2017 00:40    TPro  6.3  /  Alb  x   /  TBili  x   /  DBili  x   /  AST  x   /  ALT  x   /  AlkPhos  x         CARDIAC MARKERS ( 27 Sep 2017 06:30 )  x     / x     / 10 u/L / x     / x          Urinalysis Basic - ( 26 Sep 2017 15:02 )    Color: PLYEL / Appearance: CLEAR / S.010 / pH: 6.5  Gluc: 50 / Ketone: NEGATIVE  / Bili: NEGATIVE / Urobili: NORMAL E.U.   Blood: NEGATIVE / Protein: 30 / Nitrite: NEGATIVE   Leuk Esterase: NEGATIVE / RBC: 0-2 / WBC 0-2   Sq Epi: OCC / Non Sq Epi: x / Bacteria: x        RADIOLOGY & ADDITIONAL TESTS:    Imaging Personally Reviewed:   CT Chest No Cont (17 @ 16:00) >  Bulky adenopathy in the chest and upper abdomen which can be   seen with chronic lymphocytic leukemia.   Mild bibasilar pulmonary fibrosis.    < end of copied text >    Consultant(s) Notes Reviewed:    Care Discussed with Consultants/Other Providers:

## 2017-09-27 NOTE — PROGRESS NOTE ADULT - ASSESSMENT
47yoF hx of sarcoidosis (dx 2010 via biopsy showing noncaseating granulomas not on any treatment), T2DM, sent in from outside rheumatologist for abnormal labs showing MIKALA and hypercalcemia. Pt on admission (9/25) had Ca+ 15.3, creatinine 5.65. Rheum consulted for further evaluation. As of 9/27, on IV fluids Ca improved to 11.5, Cr improved to 4.3.     1) Hypercalcemia- differential includes sarcoidosis vs. malignancy such as MM/lymphoma. Pt has diagnosed sarcoid not on any treatment  HyperCa+ can be due to excess Vit D production by granulomas. However, she should also undergo w/u to rule out multiple myeloma  - Vit D 25-OH low, 1-25OH high, ACE level pending  - SPEP w increased polyclonal gamma fraction, UPEP w bands in gamma region  - CT Chest diffuse adenopathy, bibasilar pulmonary fibrosis, no pulmonary nodules  - Immunoglobulins IgA ULN, IgG ULN, IgM wnl  - ESR 40 (above age-adjusted ULN), CRP 10.9  - Follow up HIV    2) MIKALA - MIKALA may be 2/2 hypercalcemia or AIN 2/2 NSAID use. Pt also notes recent Abx use for UTI treatment  - UA appears non-infectious; Urine culture collected  - c/w IV fluids  - US renal w/o hydronephrosis, with parenchyma echogenic  - If no improvement, will consider steroids  - In anticipation of possible steroid use, check Quant Gold, check Acute Hepatitis Panel    3) Anemia- pt has microcytic anemia w Abs Retic 0.0, likely failure of production  - Fe, wnl, TIBC wnl, UIBC wnl, Ferritin wnl, LDH low, haptoglobin wnl    4) Progressive ENGLISH - concern for pulm vs CARDIAC sarcoid vs possible pericardial effusion 2/2 renal failure/uremia  - Follow up TTE     will follow 47yoF hx of sarcoidosis (dx 2010 via biopsy showing noncaseating granulomas not on any treatment), T2DM, sent in from outside rheumatologist for abnormal labs showing MIKALA and hypercalcemia, in setting of subacute-to-acute sx/sx including weight loss, night sweats, and dyspnea on exertion. Pt on admission (9/25) had Ca+ 15.3, creatinine 5.65, diffuse lymphadenopathy on physical exam and CT chest. As of 9/27, on IV fluids Ca improved to 11.5, Cr improved to 4.3.   Diff Dx for hypercalcemia, MIKALA, recent weight loss & fatigue includes sarcoidosis but also hematologic malignancy (favored by diffuse lymphadenopathy even outside lungs). Given patient's subjective and objective improvement since admission, and given possible malignancy, recommend deferring steroids until after excisional biopsy of peripheral lymphadenopathy.     1) Hypercalcemia- differential includes sarcoidosis vs. malignancy such as MM/lymphoma. Pt has diagnosed sarcoid not on any treatment  HyperCa+ can be due to excess Vit D production by granulomas. However, she should also undergo w/u to rule out multiple myeloma  - Vit D 25-OH low, 1-25OH high, ACE level pending  - SPEP w increased polyclonal gamma fraction, UPEP w bands in gamma region  - CT Chest diffuse adenopathy, bibasilar pulmonary fibrosis, no pulmonary nodules  - Immunoglobulins IgA ULN, IgG ULN, IgM wnl  - ESR 40 (above age-adjusted ULN), CRP 10.9  - Follow up HIV    2) MIKALA - MIKALA may be 2/2 hypercalcemia or AIN 2/2 NSAID use. Pt also notes recent Abx use for UTI treatment  - UA appears non-infectious; Urine culture collected  - c/w IV fluids  - US renal w/o hydronephrosis, with parenchyma echogenic  - If improvement stops, will consider steroids  - In anticipation of possible steroid use, check Quant Gold, check Acute Hepatitis Panel    3) Anemia- pt has microcytic anemia w Retic Index 0.5%, likely failure of production (low EPO vs lymphoma vs parvovirus infection vs other)  - Fe, wnl, TIBC wnl, UIBC wnl, Ferritin wnl, LDH low, haptoglobin wnl    4) Progressive ENGLISH - concern for pulm vs CARDIAC sarcoid vs possible pericardial effusion 2/2 renal failure/uremia  - Follow up TTE     5) Fever, chills, night sweats, weight loss; diffuse lymphadenopathy  - Excisional biopsy of peripheral lymph node(s)    will follow 47yoF hx of sarcoidosis (dx 2010 via biopsy showing noncaseating granulomas not on any treatment), T2DM, sent in from outside rheumatologist for abnormal labs showing MIKALA and hypercalcemia, in setting of subacute-to-acute sx/sx including weight loss, night sweats, and dyspnea on exertion. Pt on admission (9/25) had Ca+ 15.3, creatinine 5.65, diffuse lymphadenopathy on physical exam and CT chest. As of 9/27, on IV fluids Ca improved to 11.5, Cr improved to 4.3.   Diff Dx for hypercalcemia, MIKALA, recent weight loss & fatigue includes sarcoidosis but also hematologic malignancy (favored by diffuse lymphadenopathy even outside lungs). Given patient's subjective and objective improvement since admission, and given possible malignancy, recommend deferring steroids until after excisional biopsy of peripheral lymphadenopathy.     1) Hypercalcemia- differential includes sarcoidosis vs. malignancy such as MM/lymphoma. Pt has diagnosed sarcoid not on any treatment  HyperCa+ can be due to excess Vit D production by granulomas. However, she should also undergo w/u to rule out multiple myeloma  - Vit D 25-OH low, 1-25OH high, ACE level pending  - SPEP w increased polyclonal gamma fraction, UPEP w bands in gamma region  - CT Chest diffuse adenopathy, bibasilar pulmonary fibrosis, no pulmonary nodules  - Immunoglobulins IgA ULN, IgG ULN, IgM wnl  - ESR 40 (above age-adjusted ULN), CRP 10.9  - Follow up HIV    2) MIKALA - MIKALA may be 2/2 hypercalcemia or AIN 2/2 NSAID use. Pt also notes recent Abx use for UTI treatment  - UA appears non-infectious; Urine culture collected  - c/w IV fluids  - US renal w/o hydronephrosis, with parenchyma echogenic  - If improvement stops, will consider steroids  - In anticipation of possible steroid use, check Quant Gold, check Acute Hepatitis Panel    3) Anemia- pt has microcytic anemia w Retic Index 0.5%, likely failure of production (low EPO vs lymphoma vs parvovirus infection vs other)  - Fe, wnl, TIBC wnl, UIBC wnl, Ferritin wnl, LDH low, haptoglobin wnl    4) Progressive ENGLISH - concern for pulm vs CARDIAC sarcoid vs possible pericardial effusion 2/2 renal failure/uremia  - Follow up TTE     5) Fever, chills, night sweats, weight loss; diffuse lymphadenopathy  - Excisional biopsy of peripheral lymph node(s), with analyses to include AFB staining    will follow 47yoF hx of sarcoidosis (dx 2010 via biopsy showing noncaseating granulomas not on any treatment), T2DM, sent in from outside rheumatologist for abnormal labs showing MIKALA and hypercalcemia, in setting of subacute-to-acute sx/sx including weight loss, night sweats, and dyspnea on exertion. Pt on admission (9/25) had Ca+ 15.3, creatinine 5.65, diffuse lymphadenopathy on physical exam and CT chest. As of 9/27, on IV fluids Ca improved to 11.5, Cr improved to 4.3.   Diff Dx for hypercalcemia, MIKALA, recent weight loss & fatigue includes sarcoidosis but also hematologic malignancy (favored by diffuse lymphadenopathy even outside lungs), although patient has history of lymphadenopathy/weight loss/night sweats that in 12/2014 prompted outpatient biopsy of R-groin lymph node demonstrating granulomatous inflammation (see Chart Note from 9/27 afternoon and/or patient's physical chart for details).     1) Hypercalcemia- differential includes sarcoidosis vs. malignancy such as MM/lymphoma. Pt has diagnosed sarcoid not on any treatment  HyperCa+ can be due to excess Vit D production by granulomas. However, she should also undergo w/u to rule out multiple myeloma  - Vit D 25-OH low, 1-25OH high, ACE level pending (elevated as outpatient 9/22)  - SPEP w increased polyclonal gamma fraction, UPEP w bands in gamma region  - Immunofixation pending (as outpatient 9/22, showed no monoclonal proteins in either Urine immunofixation or Serum immunofixation)  - CT Chest diffuse adenopathy, bibasilar pulmonary fibrosis, no pulmonary nodules  - Immunoglobulins IgA ULN, IgG ULN, IgM wnl  - ESR 40 (above age-adjusted ULN), CRP 10.9  - Follow up HIV  - Follow up Kappa:Lambda ratio    2) MIKALA - MIKALA may be 2/2 hypercalcemia or AIN 2/2 NSAID use. Pt also notes recent Abx use for UTI treatment  - UA appears non-infectious; Urine culture collected  - c/w IV fluids  - US renal w/o hydronephrosis, with parenchyma echogenic  - If improvement stops, will consider steroids  - In anticipation of possible steroid use, consider checking Quant Gold (negative as outpatient 9/22/17) and Acute Hepatitis Panel (negative as outpatient 9/22/17)    3) Anemia- pt has microcytic anemia w Retic Index 0.5%, likely failure of production (low EPO vs lymphoma vs parvovirus infection vs other)  - Fe, wnl, TIBC wnl, UIBC wnl, Ferritin wnl, LDH low, haptoglobin wnl    4) Progressive ENGLISH - concern for pulm vs CARDIAC sarcoid vs possible pericardial effusion 2/2 renal failure/uremia  - Follow up TTE     5) Fever, chills, night sweats, weight loss; diffuse lymphadenopathy; hx of R groin lymph-node bx (12/2014) revealing granulomatous lymphadenitis, AFB neg  - Consider repeat excisional biopsy of peripheral lymph node(s), with analyses to include AFB staining    will follow

## 2017-09-27 NOTE — PROGRESS NOTE ADULT - PROBLEM SELECTOR PLAN 3
Normcytic/microcytic anemia. Differntial includes iron deficiency vs anemia of chronic disease. Low suspicion for hemolysis given normal Bilirubin and unchanged Hg level from prior examinations. Iron studies not consistent with chronic disease or deficiency. Reticulocyte count showing no marrow response.   -Monitor CBC daily  -Will consider alternative diagnoses for pts presentation such as lymphoma,MM

## 2017-09-28 ENCOUNTER — RESULT REVIEW (OUTPATIENT)
Age: 47
End: 2017-09-28

## 2017-09-28 DIAGNOSIS — D72.819 DECREASED WHITE BLOOD CELL COUNT, UNSPECIFIED: ICD-10-CM

## 2017-09-28 DIAGNOSIS — R50.9 FEVER, UNSPECIFIED: ICD-10-CM

## 2017-09-28 DIAGNOSIS — R59.1 GENERALIZED ENLARGED LYMPH NODES: ICD-10-CM

## 2017-09-28 LAB
APTT BLD: 31.9 SEC — SIGNIFICANT CHANGE UP (ref 27.5–37.4)
B PERT DNA SPEC QL NAA+PROBE: SIGNIFICANT CHANGE UP
BACTERIA UR CULT: SIGNIFICANT CHANGE UP
BASOPHILS # BLD AUTO: 0.01 K/UL — SIGNIFICANT CHANGE UP (ref 0–0.2)
BASOPHILS NFR BLD AUTO: 0.3 % — SIGNIFICANT CHANGE UP (ref 0–2)
BUN SERPL-MCNC: 46 MG/DL — HIGH (ref 7–23)
C PNEUM DNA SPEC QL NAA+PROBE: NOT DETECTED — SIGNIFICANT CHANGE UP
CALCIUM SERPL-MCNC: 11.4 MG/DL — HIGH (ref 8.4–10.5)
CHLORIDE SERPL-SCNC: 109 MMOL/L — HIGH (ref 98–107)
CO2 SERPL-SCNC: 19 MMOL/L — LOW (ref 22–31)
CREAT SERPL-MCNC: 4.11 MG/DL — HIGH (ref 0.5–1.3)
DEPRECATED KAPPA LC FREE/LAMBDA SER: 2.66 — SIGNIFICANT CHANGE UP (ref 2.04–10.37)
EOSINOPHIL # BLD AUTO: 0.47 K/UL — SIGNIFICANT CHANGE UP (ref 0–0.5)
EOSINOPHIL NFR BLD AUTO: 12.7 % — HIGH (ref 0–6)
FLUAV H1 2009 PAND RNA SPEC QL NAA+PROBE: NOT DETECTED — SIGNIFICANT CHANGE UP
FLUAV H1 RNA SPEC QL NAA+PROBE: NOT DETECTED — SIGNIFICANT CHANGE UP
FLUAV H3 RNA SPEC QL NAA+PROBE: NOT DETECTED — SIGNIFICANT CHANGE UP
FLUAV SUBTYP SPEC NAA+PROBE: SIGNIFICANT CHANGE UP
FLUBV RNA SPEC QL NAA+PROBE: NOT DETECTED — SIGNIFICANT CHANGE UP
GAS PNL BLDMV: SIGNIFICANT CHANGE UP
GLUCOSE SERPL-MCNC: 91 MG/DL — SIGNIFICANT CHANGE UP (ref 70–99)
HADV DNA SPEC QL NAA+PROBE: NOT DETECTED — SIGNIFICANT CHANGE UP
HCOV 229E RNA SPEC QL NAA+PROBE: NOT DETECTED — SIGNIFICANT CHANGE UP
HCOV HKU1 RNA SPEC QL NAA+PROBE: NOT DETECTED — SIGNIFICANT CHANGE UP
HCOV NL63 RNA SPEC QL NAA+PROBE: NOT DETECTED — SIGNIFICANT CHANGE UP
HCOV OC43 RNA SPEC QL NAA+PROBE: NOT DETECTED — SIGNIFICANT CHANGE UP
HCT VFR BLD CALC: 25.1 % — LOW (ref 34.5–45)
HGB BLD-MCNC: 8.1 G/DL — LOW (ref 11.5–15.5)
HMPV RNA SPEC QL NAA+PROBE: NOT DETECTED — SIGNIFICANT CHANGE UP
HPIV1 RNA SPEC QL NAA+PROBE: NOT DETECTED — SIGNIFICANT CHANGE UP
HPIV2 RNA SPEC QL NAA+PROBE: NOT DETECTED — SIGNIFICANT CHANGE UP
HPIV3 RNA SPEC QL NAA+PROBE: NOT DETECTED — SIGNIFICANT CHANGE UP
HPIV4 RNA SPEC QL NAA+PROBE: NOT DETECTED — SIGNIFICANT CHANGE UP
IMM GRANULOCYTES # BLD AUTO: 0.05 # — SIGNIFICANT CHANGE UP
IMM GRANULOCYTES NFR BLD AUTO: 1.3 % — SIGNIFICANT CHANGE UP (ref 0–1.5)
INR BLD: 1.07 — SIGNIFICANT CHANGE UP (ref 0.88–1.17)
KAPPA LC UR-MCNC: 144 MG/L — HIGH (ref 1.35–24.19)
LAMBDA LC UR-MCNC: 54.2 MG/L — HIGH (ref 0.24–6.66)
LYMPHOCYTES # BLD AUTO: 1.03 K/UL — SIGNIFICANT CHANGE UP (ref 1–3.3)
LYMPHOCYTES # BLD AUTO: 27.8 % — SIGNIFICANT CHANGE UP (ref 13–44)
M PNEUMO DNA SPEC QL NAA+PROBE: NOT DETECTED — SIGNIFICANT CHANGE UP
M PROTEIN 24H MFR UR ELPH: 300 MG/24 HR — SIGNIFICANT CHANGE UP
MAGNESIUM SERPL-MCNC: 1.3 MG/DL — LOW (ref 1.6–2.6)
MCHC RBC-ENTMCNC: 25.8 PG — LOW (ref 27–34)
MCHC RBC-ENTMCNC: 32.3 % — SIGNIFICANT CHANGE UP (ref 32–36)
MCV RBC AUTO: 79.9 FL — LOW (ref 80–100)
MONOCYTES # BLD AUTO: 0.44 K/UL — SIGNIFICANT CHANGE UP (ref 0–0.9)
MONOCYTES NFR BLD AUTO: 11.9 % — SIGNIFICANT CHANGE UP (ref 2–14)
NEUTROPHILS # BLD AUTO: 1.71 K/UL — LOW (ref 1.8–7.4)
NEUTROPHILS NFR BLD AUTO: 46 % — SIGNIFICANT CHANGE UP (ref 43–77)
NRBC # FLD: 0 — SIGNIFICANT CHANGE UP
PHOSPHATE SERPL-MCNC: 4.5 MG/DL — SIGNIFICANT CHANGE UP (ref 2.5–4.5)
PLATELET # BLD AUTO: 178 K/UL — SIGNIFICANT CHANGE UP (ref 150–400)
PMV BLD: 9.4 FL — SIGNIFICANT CHANGE UP (ref 7–13)
POTASSIUM SERPL-MCNC: 4.4 MMOL/L — SIGNIFICANT CHANGE UP (ref 3.5–5.3)
POTASSIUM SERPL-SCNC: 4.4 MMOL/L — SIGNIFICANT CHANGE UP (ref 3.5–5.3)
PROTHROM AB SERPL-ACNC: 12 SEC — SIGNIFICANT CHANGE UP (ref 9.8–13.1)
RBC # BLD: 3.14 M/UL — LOW (ref 3.8–5.2)
RBC # FLD: 13.1 % — SIGNIFICANT CHANGE UP (ref 10.3–14.5)
RSV RNA SPEC QL NAA+PROBE: NOT DETECTED — SIGNIFICANT CHANGE UP
RV+EV RNA SPEC QL NAA+PROBE: NOT DETECTED — SIGNIFICANT CHANGE UP
SODIUM SERPL-SCNC: 141 MMOL/L — SIGNIFICANT CHANGE UP (ref 135–145)
SPECIMEN SOURCE: SIGNIFICANT CHANGE UP
SPECIMEN VOL 24H UR: 2500 ML — SIGNIFICANT CHANGE UP
WBC # BLD: 3.71 K/UL — LOW (ref 3.8–10.5)
WBC # FLD AUTO: 3.71 K/UL — LOW (ref 3.8–10.5)

## 2017-09-28 PROCEDURE — 71010: CPT | Mod: 26

## 2017-09-28 PROCEDURE — 76937 US GUIDE VASCULAR ACCESS: CPT | Mod: 26

## 2017-09-28 PROCEDURE — 99233 SBSQ HOSP IP/OBS HIGH 50: CPT | Mod: GC

## 2017-09-28 PROCEDURE — 88305 TISSUE EXAM BY PATHOLOGIST: CPT | Mod: 26

## 2017-09-28 PROCEDURE — 88312 SPECIAL STAINS GROUP 1: CPT | Mod: 26

## 2017-09-28 PROCEDURE — 93010 ELECTROCARDIOGRAM REPORT: CPT

## 2017-09-28 PROCEDURE — 99232 SBSQ HOSP IP/OBS MODERATE 35: CPT | Mod: GC

## 2017-09-28 PROCEDURE — 38505 NEEDLE BIOPSY LYMPH NODES: CPT

## 2017-09-28 PROCEDURE — 99231 SBSQ HOSP IP/OBS SF/LOW 25: CPT | Mod: GC

## 2017-09-28 RX ORDER — MAGNESIUM SULFATE 500 MG/ML
2 VIAL (ML) INJECTION ONCE
Qty: 0 | Refills: 0 | Status: COMPLETED | OUTPATIENT
Start: 2017-09-28 | End: 2017-09-28

## 2017-09-28 RX ADMIN — OXYCODONE AND ACETAMINOPHEN 1 TABLET(S): 5; 325 TABLET ORAL at 08:59

## 2017-09-28 RX ADMIN — OXYCODONE AND ACETAMINOPHEN 1 TABLET(S): 5; 325 TABLET ORAL at 21:41

## 2017-09-28 RX ADMIN — OXYCODONE AND ACETAMINOPHEN 1 TABLET(S): 5; 325 TABLET ORAL at 22:11

## 2017-09-28 RX ADMIN — SODIUM CHLORIDE 100 MILLILITER(S): 9 INJECTION INTRAMUSCULAR; INTRAVENOUS; SUBCUTANEOUS at 08:59

## 2017-09-28 RX ADMIN — Medication 50 GRAM(S): at 08:59

## 2017-09-28 RX ADMIN — SODIUM CHLORIDE 100 MILLILITER(S): 9 INJECTION INTRAMUSCULAR; INTRAVENOUS; SUBCUTANEOUS at 21:41

## 2017-09-28 RX ADMIN — OXYCODONE AND ACETAMINOPHEN 1 TABLET(S): 5; 325 TABLET ORAL at 08:03

## 2017-09-28 RX ADMIN — SODIUM CHLORIDE 100 MILLILITER(S): 9 INJECTION INTRAMUSCULAR; INTRAVENOUS; SUBCUTANEOUS at 19:31

## 2017-09-28 NOTE — CONSULT NOTE ADULT - SUBJECTIVE AND OBJECTIVE BOX
HPI:  DRAFT DRAFT DRAFT *****  47 y.o F with pmh DMII, Nephrolithiasis, Sarcoidosis presenting from pulmonologist office with abnormal labs. PT said that she went to visit a new pulmonologist to establish care. She has not seen a pulmonologist or been treated for her Sarcoidosis for at least the last three years, despite being diagnosed with Sarcoidosis in . She reports that for the past 3 months she has been having fevers/night sweats, 20 pounds weight loss, and fatigue. Pt reports that on 17, she was given antibiotics by her PCP for UTI after reporting burning during urination. Within the past week she has been experiencing worsening diffuse crampy intermittent abdominal pain 10/10 in intensity not associated with diarrhea. She also notes crampy bilateral leg pain below the knees since Friday. She has been using aleve 2-4 times daily to control the pain in her legs. This is on top of the aleve she has taken for several years for intermittent headaches. Prior to the last four days, ambulation beyond a few blocks was limited due to her shortness of breath, but now the pain in her legs limits her ability to walk across the room without rest. Pt endorses nausea, headaches, dizziness, back pain, cough, shortness of breath, orthopnea, skin changes on right arm and right thigh. Denies vision changes, vomiting, diarrhea, changes in urinary or bowel frequency, or chest pain at this time.    In the ED, her vital signs were T 98.5F, HR 98/min, /70, RR 16/min, SpO2 100% on RA. She was found to have Ca 15.3 and creatinine 5.65. Pt was given 2L NS bolus. Serum calcium improved to 12.8 and serum creatinine to 5.2. (26 Sep 2017 08:27)      PAST MEDICAL & SURGICAL HISTORY:  Diabetes  Sarcoidosis  No significant past surgical history      General: denies fevers, chills  Skin/Breast: denies rash   Ophthalmologic: denies blurry vision  ENMT: denies throat pain  Respiratory and Thorax: denies cough, denies shortness of breath  Cardiovascular: denies chest pain, palpitations. Denies LE swelling   Gastrointestinal: denies abdominal pain/ nausea/ vomiting/ diarrhea. Denies BRBPR/ melena   Genitourinary: Denies dysuria  Musculoskeletal: Denies mylagias   Neurological: Denies syncope  Psychiatric: Denies mood disturbance   Hematology/Lymphatics: denies bleeding/bruising. Denies skin lumps 	    MEDICATIONS  (STANDING):  sodium chloride 0.9%. 1000 milliLiter(s) (100 mL/Hr) IV Continuous <Continuous>  insulin lispro (HumaLOG) corrective regimen sliding scale   SubCutaneous three times a day before meals  insulin lispro (HumaLOG) corrective regimen sliding scale   SubCutaneous at bedtime  dextrose 5%. 1000 milliLiter(s) (50 mL/Hr) IV Continuous <Continuous>  dextrose 50% Injectable 12.5 Gram(s) IV Push once  dextrose 50% Injectable 25 Gram(s) IV Push once  dextrose 50% Injectable 25 Gram(s) IV Push once  influenza   Vaccine 0.5 milliLiter(s) IntraMuscular once    MEDICATIONS  (PRN):  dextrose Gel 1 Dose(s) Oral once PRN Blood Glucose LESS THAN 70 milliGRAM(s)/deciliter  glucagon  Injectable 1 milliGRAM(s) IntraMuscular once PRN Glucose LESS THAN 70 milligrams/deciliter  oxyCODONE    5 mG/acetaminophen 325 mG 1 Tablet(s) Oral every 6 hours PRN Severe Pain (7 - 10)  melatonin 3 milliGRAM(s) Oral at bedtime PRN Insomnia      Allergies    No Known Allergies    Intolerances        SOCIAL HISTORY:    FAMILY HISTORY:  Family history of type C viral hepatitis (Mother): Mother      Vital Signs Last 24 Hrs  T(C): 36.8 (28 Sep 2017 14:04), Max: 38.4 (27 Sep 2017 19:27)  T(F): 98.2 (28 Sep 2017 14:04), Max: 101.1 (27 Sep 2017 19:27)  HR: 99 (28 Sep 2017 14:04) (97 - 109)  BP: 154/80 (28 Sep 2017 14:04) (134/75 - 154/80)  BP(mean): --  RR: 18 (28 Sep 2017 14:04) (16 - 18)  SpO2: 100% (28 Sep 2017 14:04) (97% - 100%)    PHYSICAL EXAM:    GENERAL: NAD, AAOx3   HEAD:  NC/AT  EYES: EOMI, PERRLA, no scleral icterus  HEENT: Moist mucous membranes  LUNG: Clear to auscultation bilaterally; No rales, rhonchi, wheezing, or rubs  HEART: RRR; No murmurs, rubs, or gallops  ABDOMEN: +BS, ST/ND/NT  EXTREMITIES:  2+ Peripheral Pulses, No clubbing, cyanosis, or edema  LAD: no palpable adenopathy    LABS:                        8.1    3.71  )-----------( 178      ( 28 Sep 2017 06:30 )             25.1         141  |  109<H>  |  46<H>  ----------------------------<  91  4.4   |  19<L>  |  4.11<H>    Ca    11.4<H>      28 Sep 2017 06:30  Phos  4.5       Mg     1.3         TPro  6.3  /  Alb  x   /  TBili  x   /  DBili  x   /  AST  x   /  ALT  x   /  AlkPhos  x       PT/INR - ( 28 Sep 2017 09:51 )   PT: 12.0 SEC;   INR: 1.07          PTT - ( 28 Sep 2017 09:51 )  PTT:31.9 SEC  Urinalysis Basic - ( 26 Sep 2017 15:02 )    Color: PLYEL / Appearance: CLEAR / S.010 / pH: 6.5  Gluc: 50 / Ketone: NEGATIVE  / Bili: NEGATIVE / Urobili: NORMAL E.U.   Blood: NEGATIVE / Protein: 30 / Nitrite: NEGATIVE   Leuk Esterase: NEGATIVE / RBC: 0-2 / WBC 0-2   Sq Epi: OCC / Non Sq Epi: x / Bacteria: x            RADIOLOGY & ADDITIONAL STUDIES:  < from: CT Chest No Cont (17 @ 16:00) >  EXAM:  CT CHEST        PROCEDURE DATE:  Sep 26 2017         INTERPRETATION:  CLINICAL INFORMATION: Sarcoidosis.    COMPARISON: Chest radiograph 2010.    PROCEDURE:   CT of the Chest was performed without intravenous contrast.  Sagittal and coronal reformats were performed.      FINDINGS:    CHEST:     LUNGS AND LARGE AIRWAYS: Patent central airways. Mild fibrosis and   traction bronchiectasis involving the right middle lobe, lingula and to a   lesser extent the periphery of both lower lobes. No pulmonary nodules.  PLEURA: No pleural effusion.  VESSELS: Within normal limits.  HEART: Heart size is normal. No pericardial effusion.  MEDIASTINUM AND SANJANA: 1.7 x 1.3 cm prevascular adenopathy. 2.4 x 1.7 cm   subcarinal adenopathy. Additional smaller paratracheal and bilateral   hilar lymph nodes.  CHEST WALL AND LOWER NECK: Bulky bilateral axillary and subcutaneous   pectoral adenopathy measuring up to 3.6 x 3.3 cm on the right (series 2,   image 27) and 3.2 x 2.3 cm on the left (series 2, image 29). Bilateral   supraclavicular adenopathy is also noted.  VISUALIZED UPPER ABDOMEN: Bulky upper abdominal adenopathy noted. Status   post cholecystectomy.  BONES: Within normal limits.    IMPRESSION: Bulky adenopathy in the chest and upper abdomen which can be   seen with chronic lymphocytic leukemia.   Mild bibasilar pulmonary fibrosis.                    MARISELA LONDON M.D., ATTENDING RADIOLOGIST  This document has been electronically signed. Sep 26 2017  4:40PM          < end of copied text >  < from: Xray Chest 1 view AP/PA- Port (06.24.10 @ 08:57) >  EXAM:  RAD CHEST PORTABLE ROUTINE        PROCEDURE DATE:  2010     .    INTERPRETATION:  HISTORY: Status post bronchoscopy with right lower lobe   biopsy. Evaluate for pneumothorax.    Portable frontal view of the chest from 2010 at 0855.    COMPARISON: None.    FINDINGS:  The lungs are clear. There are no pleural effusions.  The cardiomediastinal structures are within normal limits.  The visualized osseous structures are unremarkable.        IMPRESSION:   Clear lungs.        JEFF RIZVI M.D., RESIDENT RADIOLOGIST    PASTOR GUDINO M.D., ATTENDING RADIOLOGIST  This examination was interpreted on:  2010  3:23P.  This document   has been electronically signed. 2010  4:09P.            < end of copied text > pt is a 47 y.o F with  DMII, Nephrolithiasis and Sarcoidosis dx in  never treated presenting from pulmonologist office with renal failure and hypercalcemia. She was establishing care with a new pulmonologist as over the past 3 months she reports fevers/night sweats, 20 pounds weight loss, and fatigue. Pt admits to recent abx use for UTI and increased NSAID use for b.l Leg pain recently and headaches. She denies  any recent travel, sick contacts, vision changes, vomiting, diarrhea, changes in urinary or bowel frequency, or chest pain. Currently she feels fatigue and has mild pain at her axillary LN biopsy site.     In the ED, her vital signs were T 98.5F, HR 98/min, /70, RR 16/min, SpO2 100% on RA. She was found to have Ca 15.3 and creatinine 5.65. Pt was given 2L NS bolus. Serum calcium improved to 12.8 and serum creatinine to 5.2. (26 Sep 2017 08:27)      PAST MEDICAL & SURGICAL HISTORY:  Diabetes  Sarcoidosis  No significant past surgical history      General: denies fevers, chills currently  Skin/Breast: denies rash   Ophthalmologic: denies blurry vision  ENMT: denies throat pain  Respiratory and Thorax: denies cough, denies shortness of breath  Cardiovascular: denies chest pain, palpitations. Denies LE swelling   Gastrointestinal: denies abdominal pain/ nausea/ vomiting/ diarrhea. Denies BRBPR/ melena   Genitourinary: Denies dysuria  Musculoskeletal: Denies mylagias   Neurological: Denies syncope  Psychiatric: Denies mood disturbance   Hematology/Lymphatics: denies bleeding/bruising. + axillary lymphadenopathy    MEDICATIONS  (STANDING):  sodium chloride 0.9%. 1000 milliLiter(s) (100 mL/Hr) IV Continuous <Continuous>  insulin lispro (HumaLOG) corrective regimen sliding scale   SubCutaneous three times a day before meals  insulin lispro (HumaLOG) corrective regimen sliding scale   SubCutaneous at bedtime  dextrose 5%. 1000 milliLiter(s) (50 mL/Hr) IV Continuous <Continuous>  dextrose 50% Injectable 12.5 Gram(s) IV Push once  dextrose 50% Injectable 25 Gram(s) IV Push once  dextrose 50% Injectable 25 Gram(s) IV Push once  influenza   Vaccine 0.5 milliLiter(s) IntraMuscular once    MEDICATIONS  (PRN):  dextrose Gel 1 Dose(s) Oral once PRN Blood Glucose LESS THAN 70 milliGRAM(s)/deciliter  glucagon  Injectable 1 milliGRAM(s) IntraMuscular once PRN Glucose LESS THAN 70 milligrams/deciliter  oxyCODONE    5 mG/acetaminophen 325 mG 1 Tablet(s) Oral every 6 hours PRN Severe Pain (7 - 10)  melatonin 3 milliGRAM(s) Oral at bedtime PRN Insomnia      Allergies    No Known Allergies    Intolerances        SOCIAL HISTORY:    FAMILY HISTORY:  Family history of type C viral hepatitis (Mother): Mother      Vital Signs Last 24 Hrs  T(C): 36.8 (28 Sep 2017 14:04), Max: 38.4 (27 Sep 2017 19:27)  T(F): 98.2 (28 Sep 2017 14:04), Max: 101.1 (27 Sep 2017 19:27)  HR: 99 (28 Sep 2017 14:04) (97 - 109)  BP: 154/80 (28 Sep 2017 14:04) (134/75 - 154/80)  BP(mean): --  RR: 18 (28 Sep 2017 14:04) (16 - 18)  SpO2: 100% (28 Sep 2017 14:04) (97% - 100%)    PHYSICAL EXAM:    GENERAL: NAD, AAOx3   HEAD:  NC/AT  EYES: EOMI, PERRLA, no scleral icterus  HEENT: Moist mucous membranes  LUNG: Clear to auscultation bilaterally; No rales, rhonchi, wheezing, or rubs  HEART: RRR; No murmurs, rubs, or gallops  ABDOMEN: +BS, ST/ND/NT  EXTREMITIES:  2+ Peripheral Pulses, No clubbing, cyanosis, or edema  LAD: + palpable axillary lymphadenopathy bilaterally. left axilla dressing clean dry intact.    LABS:                        8.1    3.71  )-----------( 178      ( 28 Sep 2017 06:30 )             25.1     -    141  |  109<H>  |  46<H>  ----------------------------<  91  4.4   |  19<L>  |  4.11<H>    Ca    11.4<H>      28 Sep 2017 06:30  Phos  4.5       Mg     1.3         TPro  6.3  /  Alb  x   /  TBili  x   /  DBili  x   /  AST  x   /  ALT  x   /  AlkPhos  x       PT/INR - ( 28 Sep 2017 09:51 )   PT: 12.0 SEC;   INR: 1.07          PTT - ( 28 Sep 2017 09:51 )  PTT:31.9 SEC  Urinalysis Basic - ( 26 Sep 2017 15:02 )    Color: PLYEL / Appearance: CLEAR / S.010 / pH: 6.5  Gluc: 50 / Ketone: NEGATIVE  / Bili: NEGATIVE / Urobili: NORMAL E.U.   Blood: NEGATIVE / Protein: 30 / Nitrite: NEGATIVE   Leuk Esterase: NEGATIVE / RBC: 0-2 / WBC 0-2   Sq Epi: OCC / Non Sq Epi: x / Bacteria: x            RADIOLOGY & ADDITIONAL STUDIES:  < from: CT Chest No Cont (17 @ 16:00) >  EXAM:  CT CHEST        PROCEDURE DATE:  Sep 26 2017         INTERPRETATION:  CLINICAL INFORMATION: Sarcoidosis.    COMPARISON: Chest radiograph 2010.    PROCEDURE:   CT of the Chest was performed without intravenous contrast.  Sagittal and coronal reformats were performed.      FINDINGS:    CHEST:     LUNGS AND LARGE AIRWAYS: Patent central airways. Mild fibrosis and   traction bronchiectasis involving the right middle lobe, lingula and to a   lesser extent the periphery of both lower lobes. No pulmonary nodules.  PLEURA: No pleural effusion.  VESSELS: Within normal limits.  HEART: Heart size is normal. No pericardial effusion.  MEDIASTINUM AND SANJANA: 1.7 x 1.3 cm prevascular adenopathy. 2.4 x 1.7 cm   subcarinal adenopathy. Additional smaller paratracheal and bilateral   hilar lymph nodes.  CHEST WALL AND LOWER NECK: Bulky bilateral axillary and subcutaneous   pectoral adenopathy measuring up to 3.6 x 3.3 cm on the right (series 2,   image 27) and 3.2 x 2.3 cm on the left (series 2, image 29). Bilateral   supraclavicular adenopathy is also noted.  VISUALIZED UPPER ABDOMEN: Bulky upper abdominal adenopathy noted. Status   post cholecystectomy.  BONES: Within normal limits.    IMPRESSION: Bulky adenopathy in the chest and upper abdomen which can be   seen with chronic lymphocytic leukemia.   Mild bibasilar pulmonary fibrosis.                    MARISELA LONDON M.D., ATTENDING RADIOLOGIST  This document has been electronically signed. Sep 26 2017  4:40PM          < end of copied text >  < from: Xray Chest 1 view AP/PA- Port (06.24.10 @ 08:57) >  EXAM:  RAD CHEST PORTABLE ROUTINE        PROCEDURE DATE:  2010     .    INTERPRETATION:  HISTORY: Status post bronchoscopy with right lower lobe   biopsy. Evaluate for pneumothorax.    Portable frontal view of the chest from 2010 at 0855.    COMPARISON: None.    FINDINGS:  The lungs are clear. There are no pleural effusions.  The cardiomediastinal structures are within normal limits.  The visualized osseous structures are unremarkable.        IMPRESSION:   Clear lungs.        JEFF RIZVI M.D., RESIDENT RADIOLOGIST    PASTOR GUDINO M.D., ATTENDING RADIOLOGIST  This examination was interpreted on:  2010  3:23P.  This document   has been electronically signed. 2010  4:09P.            < end of copied text >

## 2017-09-28 NOTE — DIETITIAN INITIAL EVALUATION ADULT. - PHYSICAL APPEARANCE
Nutrition focused physical exam conducted - found signs of malnutrition []absent [x]present   Subcutaneous fat loss: [ ] Orbital fat pads region, [ mild]Buccal fat region, [ ]Triceps region,  [ ]Ribs region  Muscle wasting: [mild ]Temples region, [ ]Clavicle region, [ ]Shoulder region, [ ]Scapula region, [ ]Interosseous region,  [ ]thigh region, [ mild]Calf region

## 2017-09-28 NOTE — PROGRESS NOTE ADULT - PROBLEM SELECTOR PLAN 7
Pt has been off treatment for over 3 years because she has not followed up with her pulmonologist  -Rheum consult appreciated. Pt has hx of biopsy proven sarcoid in 2007 and LN Bx in 2014 showing sarcoid   -Will obtain Echo given murmur   -Will defer steroids until diagnosis established  -F/u ACE level Pt has been off treatment for over 3 years because she has not followed up with her pulmonologist  -Rheum consult appreciated. Pt has hx of biopsy proven sarcoid in 2007 and LN Bx in 2014 showing sarcoid. Will obtain core biopsy today  -Will obtain Echo given murmur   -Will defer steroids until diagnosis established  -F/u ACE level  -Surgery consult appreciated

## 2017-09-28 NOTE — PROGRESS NOTE ADULT - PROBLEM SELECTOR PLAN 9
DVT PPX:Improve Score .4%. Low risk  Dispo:pending work up Pt has been off treatment for over 3 years because she has not followed up with her pulmonologist  -Rheum consult appreciated. Pt has hx of biopsy proven sarcoid in 2007 and LN Bx in 2014 showing sarcoid   -Will obtain Echo given murmur   -Will defer steroids until diagnosis established  -F/u ACE level

## 2017-09-28 NOTE — PROGRESS NOTE ADULT - PROBLEM SELECTOR PLAN 6
A1C 9/27 was 6.9  -FS TID   -Diabetic diet  -Hold metformin   -Sliding scale Unclear etiology but pt now has two cell lines (WBC, RBC) affected concerning for primary marrow issue  -Will consider hematology consult.

## 2017-09-28 NOTE — PROGRESS NOTE ADULT - PROBLEM SELECTOR PLAN 3
Normcytic/microcytic anemia likely 2/2 insufficient marrow production. Reticulocyte count showing no marrow response.   -Monitor CBC daily  -Will consider alternative diagnoses for pts presentation such as lymphoma,MM

## 2017-09-28 NOTE — PROGRESS NOTE ADULT - PROBLEM SELECTOR PLAN 8
Pt has been off treatment for over 3 years because she has not followed up with her pulmonologist  -Rheum consult appreciated. Pt has hx of biopsy proven sarcoid in 2007 and LN Bx in 2014 showing sarcoid   -Will obtain Echo given murmur   -Will defer steroids until diagnosis established  -F/u ACE level Possible 2/2 sarcoid. ANCAs neg no other evidence of vasculitis clinically   -Trend CBC. Will discuss with rheumatology

## 2017-09-28 NOTE — DIETITIAN INITIAL EVALUATION ADULT. - PERTINENT LABORATORY DATA
09-28 Na141 mmol/L Glu 91 mg/dL K+ 4.4 mmol/L Cr  4.11 mg/dL<H> BUN 46 mg/dL<H> Phos 4.5 mg/dL Alb n/a   PAB n/a

## 2017-09-28 NOTE — CONSULT NOTE ADULT - ATTENDING COMMENTS
This patient has a clinical diagnosis of sarcoidosis. There is fatigue and chronic state of hypercalcemia. A left axillary lymph node biopsy was performed today. results are pending and may be followed as an outpatient. If diagnosis yield is insufficient for sarcoid or malignancy, lymph node biopsy is recommended.

## 2017-09-28 NOTE — PROGRESS NOTE ADULT - PROBLEM SELECTOR PLAN 6
Possible 2/2 sarcoid. ANCAs neg no other evidence of vasculitis clinically   -Trend CBC. Will disucss with rheumatology

## 2017-09-28 NOTE — DIETITIAN INITIAL EVALUATION ADULT. - OTHER INFO
Nutrition consult for Wt. loss. Pt is a 47F with  F w PMH sarcoidosis (untreated for past 3 years), DM2, and nephrolithiasis, admitted for worsening sarcoid vs underlying malignancy.   Fair PO intake reported at this time.  No GI distress (nausea/vomiting/diarrhea/constipation.) No difficulties chewing or swallowing.  Pt endorses poor PO intake and appetite prior to admission 2/2 early satiety and poor appetite for the past 3 years. Pt states that she works long hours and skips meals at time. Intake of protein energy dense foods encouraged. Pt is amenable to Glucerna Shakes and states that she has tried them in the past.

## 2017-09-28 NOTE — CHART NOTE - NSCHARTNOTEFT_GEN_A_CORE
Called by RN for c/o SOB. Patient satting well, 97% on 4L NC. On my arrival, patient sitting up in bed and stating she is having difficulty catching her breath. Patient recently refused transport to a V/Q scan ordered by primary team because of concerns about laying flat. On exam, significant rales B/L noted on exam. No LE edema. Unlikely to represent CHF, so will keep fluids on for now (were ordered initially for treatment of hypercalcemia 2/2 sarcoid). Earlier in night, patient had a fever but is s/p BCx earlier today. CXR done recently negative for focal consolidation.     Returned to patient's room after a few minutes talking to nuclear medicine re: missed VQ scan and patient looks much better, was speaking on phone in NAD. Patient states her SOB is now improved. She says her SOB usually improved with supplemental oxygen. I will offer her a NRB PRN and inform RN to keep me updated if patient desats or develops worsening respiratory distress. D/w MAR. Will sign out events to day team.    Kennedy Garnett MD  PGY-1 | Preliminary Resident  Department of Internal Medicine  Pager: [phone] Called by RN for c/o SOB. Patient satting well, 97% on 4L NC. On my arrival, patient sitting up in bed and stating she is having difficulty catching her breath. Patient recently refused transport to a V/Q scan ordered by primary team because of concerns about laying flat. On exam, significant rales B/L noted on exam. No LE edema. Unlikely to represent CHF, so will keep fluids on for now (were ordered initially for treatment of hypercalcemia 2/2 sarcoid). Earlier in night, patient had a fever but is s/p BCx earlier today. CXR done recently negative for focal consolidation.     Returned to patient's room after a few minutes talking to nuclear medicine re: missed VQ scan and patient looks much better, was speaking on phone in NAD. Patient states her SOB is now improved. She says her SOB usually improved with supplemental oxygen. I will offer her a NRB PRN and inform RN to keep me updated if patient desats or develops worsening respiratory distress. I will also personally bed check the patient in a few hours.     D/w MAR. Will sign out events to day team.    Kennedy Garnett MD  PGY-1 | Preliminary Resident  Department of Internal Medicine  Pager: [phone] Called by RN for c/o SOB. Patient satting well, 97% on 4L NC. On my arrival, patient sitting up in bed and stating she is having difficulty catching her breath. Patient recently refused transport to a V/Q scan ordered by primary team because of concerns about laying flat. Significant rales B/L noted on exam. No LE edema. Unlikely to represent CHF, so will keep fluids on for now (were ordered initially for treatment of hypercalcemia 2/2 sarcoid). Earlier in night, patient had a fever but is s/p BCx earlier today. CXR done recently negative for focal consolidation.    Returned to patient's room after a few minutes talking to nuclear medicine re: missed VQ scan and patient looks much better, was speaking on phone in NAD. Patient states her SOB is now improved. She says her SOB usually improved with supplemental oxygen. I will offer her a NRB PRN and inform RN to keep me updated if patient desats or develops worsening respiratory distress. I will also personally bed check the patient in a few hours.     Bed check at 2am: patient sleeping comfortably. Satting well. Patient was on NRB briefly but is sleeping now on NC.    Will sign out events to day team.    Kennedy Garnett MD  PGY-1 | Preliminary Resident  Department of Internal Medicine  Pager: [phone]

## 2017-09-28 NOTE — PROGRESS NOTE ADULT - SUBJECTIVE AND OBJECTIVE BOX
LEONEL BEVERLY  47y  Female      Patient is a 47y old  Female who presents with a chief complaint of High calcium and creatinine sent by doctor (26 Sep 2017 16:12)      INTERVAL HPI/OVERNIGHT EVENTS:    Pt reports episode of fever and chills. T 101.1F at that time. Diffuse headache 6/10 at time of exam, which patient attributed to "hospital beeping." Endorses SOB on exertion, unchanged from yesterday. Lower leg pain improving and able to walk to the bathroom. Urinary frequency unchanged over the last few days. No dysuria. Denies CP, abd pain, n/v/c/d. Last BM 17.     REVIEW OF SYSTEMS:  CONSTITUTIONAL: No fatigue. +fever and chills overnight  EYES: No eye pain, visual disturbances, or discharge  ENMT:  No difficulty hearing, tinnitus, vertigo; No sinus or throat pain  NECK: No pain or stiffness  RESPIRATORY: +cough, +SOB. No wheezing, hemoptysis;   CARDIOVASCULAR: No chest pain, palpitations, dizziness, or leg swelling  GASTROINTESTINAL: No abdominal or epigastric pain. No diarrhea or constipation. No nausea, vomiting, or hematemesis. No melena or hematochezia.  GENITOURINARY: No dysuria, hematuria, or incontinence  NEUROLOGICAL: +headaches, No memory loss, loss of strength, numbness, or tremors  MUSCULOSKELETAL: No joint pain or swelling; No muscle, back pain. Bilateral lower extremity pain intermittent and alleviated by pain meds.  SKIN: No itching, burning, rashes, or lesions   LYMPH NODES: axillary, supraclavicular, submandibular lymph nodes  ENDOCRINE: No heat or cold intolerance; No hair loss  PSYCHIATRIC: +restlessness and difficulty sleeping. No depression, anxiety, mood swings.  HEME/LYMPH: No easy bruising, or bleeding gums  ALLERY AND IMMUNOLOGIC: No hives or eczema    T(C): 36.8 (17 @ 05:39), Max: 38.4 (17 @ 19:27)  HR: 97 (17 @ 05:39) (92 - 109)  BP: 134/75 (17 @ 05:39) (132/72 - 150/95)  RR: 17 (17 @ 05:39) (16 - 17)  SpO2: 100% (17 @ 05:39) (97% - 100%)  Wt(kg): --Vital Signs Last 24 Hrs  T(C): 36.8 (28 Sep 2017 05:39), Max: 38.4 (27 Sep 2017 19:27)  T(F): 98.3 (28 Sep 2017 05:39), Max: 101.1 (27 Sep 2017 19:27)  HR: 97 (28 Sep 2017 05:39) (92 - 109)  BP: 134/75 (28 Sep 2017 05:39) (132/72 - 150/95)  BP(mean): --  RR: 17 (28 Sep 2017 05:39) (16 - 17)  SpO2: 100% (28 Sep 2017 05:39) (97% - 100%)  Wt(kg): --    PHYSICAL EXAM:  GENERAL: NAD, well-groomed, well-developed  HEAD:  Atraumatic, Normocephalic  EYES: EOMI, PERRLA, conjunctiva and sclera clear  ENMT: Moist mucous membranes, Good dentition, No lesions  NECK: Supple, No JVD, Normal thyroid  NERVOUS SYSTEM:  Alert & Oriented X3, Good concentration; Motor Strength 5/5 B/L upper and lower extremities; DTRs 2+ intact and symmetric  CHEST/LUNG: Clear to auscultation bilaterally; No rales, rhonchi, wheezing, or rubs  HEART: Regular rate and rhythm; No murmurs, rubs, or gallops  ABDOMEN: Soft, Nontender, Nondistended; Bowel sounds present  EXTREMITIES:  2+ Peripheral Pulses, No clubbing, cyanosis, or edema  LYMPH: axillary, supraclavicular, preauricular, anterior cervical, posterior cervical lymph nodes palpable    Consultant(s) Notes Reviewed:  [x ] YES  [ ] NO    LABS:                        8.1    3.71  )-----------( 178      ( 28 Sep 2017 06:30 )             25.1     -    141  |  109<H>  |  46<H>  ----------------------------<  91  4.4   |  19<L>  |  4.11<H>    Ca    11.4<H>      28 Sep 2017 06:30  Phos  4.5       Mg     1.3         TPro  6.3  /  Alb  x   /  TBili  x   /  DBili  x   /  AST  x   /  ALT  x   /  AlkPhos  x         Urinalysis Basic - ( 26 Sep 2017 15:02 )    Color: PLYEL / Appearance: CLEAR / S.010 / pH: 6.5  Gluc: 50 / Ketone: NEGATIVE  / Bili: NEGATIVE / Urobili: NORMAL E.U.   Blood: NEGATIVE / Protein: 30 / Nitrite: NEGATIVE   Leuk Esterase: NEGATIVE / RBC: 0-2 / WBC 0-2   Sq Epi: OCC / Non Sq Epi: x / Bacteria: x      CAPILLARY BLOOD GLUCOSE  84 (28 Sep 2017 08:38)  146 (27 Sep 2017 21:49)  180 (27 Sep 2017 16:52)  134 (27 Sep 2017 12:10)            Urinalysis Basic - ( 26 Sep 2017 15:02 )    Color: PLYEL / Appearance: CLEAR / S.010 / pH: 6.5  Gluc: 50 / Ketone: NEGATIVE  / Bili: NEGATIVE / Urobili: NORMAL E.U.   Blood: NEGATIVE / Protein: 30 / Nitrite: NEGATIVE   Leuk Esterase: NEGATIVE / RBC: 0-2 / WBC 0-2   Sq Epi: OCC / Non Sq Epi: x / Bacteria: x

## 2017-09-28 NOTE — DIETITIAN INITIAL EVALUATION ADULT. - PROBLEM SELECTOR PLAN 1
Pt presenting with severe hypercalcemia in the setting of known sarcoidosis. Differential includes Sarcoidosis vs malignancy vs Vit D intoxication s/p 2L NS bolus  -PTH,PTHrp,Vit D 25,Vit D 1,25  -NS @200cc/hr  -Strict I/Os  -Consider calcitonin though pt's calcium has improved significantly with IV fluids.

## 2017-09-28 NOTE — PROGRESS NOTE ADULT - ASSESSMENT
47-year-old female with known history of sarcoidosis, DM presented to [facility] with complaints of weakness, back pain and LE pain. Pt. found to have MIKALA and hypercalcemia.

## 2017-09-28 NOTE — PROGRESS NOTE ADULT - PROBLEM SELECTOR PLAN 7
Possible 2/2 sarcoid. ANCAs neg no other evidence of vasculitis clinically   -Trend CBC. Will discuss with rheumatology A1C 9/27 was 6.9  -FS TID   -Diabetic diet  -Hold metformin   -Sliding scale

## 2017-09-28 NOTE — CHART NOTE - NSCHARTNOTEFT_GEN_A_CORE
NUTRITION SERVICES                                                                                  MALNUTRITION ALERT     Attention Health Care Provider: Upon nutritional assessment by the Registered Dietitian your patient was determined to meet criteria / has evidence of the following diagnosis/diagnoses:    [ ] Mild Protein Calorie Malnutrition   [ ] Moderate Protein Calorie Malnutrition   [x ] Severe Protein Calorie Malnutrition   [ ] Unspecified Protein Calorie Malnutrition   [ ] Underweight / BMI <19  [ ] Morbid Obesity / BMI >40    By signing this assessment you are acknowledging the diagnosis/diagnoses.       PLAN OF CARE: Refer to Initial Dietitian Evaluation or Nutrition Follow-Up Documentation for Nutritional Recommendations.

## 2017-09-28 NOTE — PROGRESS NOTE ADULT - PROBLEM SELECTOR PLAN 3
Differential includes MIKALA 2/2 malignancy(myeloma) vs 2/2 hypercalcemia vs NSAID induced nephropathy. Renal Sono showing medical renal disease. SPEP findings c/w chronic inflamation,UPEP showing gamma band  -Immunofixation,FLC  -Strict I/OS  -Nephrology consult appreciated   -Hold nephrotoxins Given history of fever, night sweats, weight loss, and several palpable lymph nodes in the axilla, supraclavicular, preauricular, and cervical regions, need to rule out lymphoma.  -Surgery to perform core needle biopsy   -will consider consulting med onc

## 2017-09-28 NOTE — PROGRESS NOTE ADULT - PROBLEM SELECTOR PLAN 1
Pt presenting with severe hypercalcemia in the setting of known sarcoidosis. Pt with Vit D 1,25 505 with otherwise normal hypercalemia w/u. Improving with IV hydration  - c/w NS  to 100cc/hr  -Strict I/Os  -Pt will benefit from steroids given elevated Vit D but will have to first r/o Lymphoma as a cause of elevated Vit D 1,25

## 2017-09-28 NOTE — PROGRESS NOTE ADULT - SUBJECTIVE AND OBJECTIVE BOX
[facility] DIVISION OF KIDNEY DISEASES AND HYPERTENSION -- [phone]   FOLLOW UP NOTE  --------------------------------------------------------------------------------  HPI:  47-year-old female with known history of sarcoidosis, DM presented to [facility] with complaints of weakness, back pain and LE pain. Pt. found to have MIKALA and hypercalcemia. Pt currently on IVF with improvement in serum creatinine and calcium levels. Pt. seen and examined at bedside. Pt. feels better with improvement in SOB. Pt. denies chest pain, LE edema.     PAST HISTORY  --------------------------------------------------------------------------------  No significant changes to PMH, PSH, FHx, SHx, unless otherwise noted    ALLERGIES & MEDICATIONS  --------------------------------------------------------------------------------  Allergies    No Known Allergies    Intolerances      Standing Inpatient Medications  sodium chloride 0.9%. 1000 milliLiter(s) IV Continuous <Continuous>  insulin lispro (HumaLOG) corrective regimen sliding scale   SubCutaneous three times a day before meals  insulin lispro (HumaLOG) corrective regimen sliding scale   SubCutaneous at bedtime  dextrose 5%. 1000 milliLiter(s) IV Continuous <Continuous>  dextrose 50% Injectable 12.5 Gram(s) IV Push once  dextrose 50% Injectable 25 Gram(s) IV Push once  dextrose 50% Injectable 25 Gram(s) IV Push once  influenza   Vaccine 0.5 milliLiter(s) IntraMuscular once    PRN Inpatient Medications  dextrose Gel 1 Dose(s) Oral once PRN  glucagon  Injectable 1 milliGRAM(s) IntraMuscular once PRN  oxyCODONE    5 mG/acetaminophen 325 mG 1 Tablet(s) Oral every 6 hours PRN  melatonin 3 milliGRAM(s) Oral at bedtime PRN      REVIEW OF SYSTEMS  --------------------------------------------------------------------------------  General: no fever  CVS: no chest pain  RESP: no sob, no cough  ABD: no abdominal pain  : no dysuria,  MSK: no edema     VITALS/PHYSICAL EXAM  --------------------------------------------------------------------------------  T(C): 36.8 (09-28-17 @ 05:39), Max: 38.4 (09-27-17 @ 19:27)  HR: 97 (09-28-17 @ 05:39) (92 - 109)  BP: 134/75 (09-28-17 @ 05:39) (132/72 - 150/95)  RR: 17 (09-28-17 @ 05:39) (16 - 17)  SpO2: 100% (09-28-17 @ 05:39) (97% - 100%)  Wt(kg): --        Physical Exam:  	Gen: NAD  	HEENT: MMM  	Pulm: CTA B/L  	CV: S1S2  	Abd: Soft, +BS   	Ext: No LE edema B/L  	Neuro: Awake, alert  	Skin: Warm and dry    LABS/STUDIES  --------------------------------------------------------------------------------              8.1    3.71  >-----------<  178      [09-28-17 @ 06:30]              25.1     141  |  109  |  46  ----------------------------<  91      [09-28-17 @ 06:30]  4.4   |  19  |  4.11        Ca     11.4     [09-28-17 @ 06:30]      Mg     1.3     [09-28-17 @ 06:30]      Phos  4.5     [09-28-17 @ 06:30]    TPro  6.3  /  Alb  x   /  TBili  x   /  DBili  x   /  AST  x   /  ALT  x   /  AlkPhos  x   [09-27-17 @ 06:30]        CK 10      [09-27-17 @ 06:30]        [09-27-17 @ 06:30]    Creatinine Trend:  SCr 4.11 [09-28 @ 06:30]  SCr 4.30 [09-27 @ 06:30]  SCr 4.61 [09-27 @ 00:40]  SCr 5.21 [09-26 @ 07:35]  SCr 5.65 [09-25 @ 23:40]    Urinalysis - [09-26-17 @ 15:02]      Color PLYEL / Appearance CLEAR / SG 1.010 / pH 6.5      Gluc 50 / Ketone NEGATIVE  / Bili NEGATIVE / Urobili NORMAL       Blood NEGATIVE / Protein 30 / Leuk Est NEGATIVE / Nitrite NEGATIVE      RBC 0-2 / WBC 0-2 / Hyaline  / Gran  / Sq Epi OCC / Non Sq Epi  / Bacteria     Urine Creatinine 35.32      [09-26-17 @ 08:34]  Urine Protein 22.7      [09-26-17 @ 09:21]  Urine Sodium 102      [09-26-17 @ 08:34]    Iron 36, TIBC 202, %sat --      [09-27-17 @ 06:30]  Ferritin 112.8      [09-27-17 @ 06:30]  PTH 8.12 (Ca --)      [09-26-17 @ 07:35]   --  Vitamin D (25OH) 12.3      [09-26-17 @ 07:35]  HbA1c 6.9      [09-27-17 @ 06:30]      ANCA: cANCA Negative, pANCA Negative, atypical ANCA --      [09-26-17 @ 09:09]  PR3-ANCA <5.0, interpretation: --      [09-26-17 @ 09:09]  Free Light Chains: kappa 17.40, lambda 8.98, ratio = --      [09-26 @ 09:09] [facility] DIVISION OF KIDNEY DISEASES AND HYPERTENSION -- [phone]   FOLLOW UP NOTE  --------------------------------------------------------------------------------  HPI: 47-year-old female with known history of sarcoidosis, DM presented to [facility] with complaints of weakness, back pain and LE pain. Pt. found to have MIKALA and hypercalcemia. Pt currently on IVF with improvement in serum creatinine and calcium levels. Pt. seen and examined at bedside. Pt. feels better with improvement in SOB. Pt. denies chest pain, LE edema. Pt. awaiting LN biopsy.    PAST HISTORY  --------------------------------------------------------------------------------  No significant changes to PMH, PSH, FHx, SHx, unless otherwise noted    ALLERGIES & MEDICATIONS  --------------------------------------------------------------------------------  Allergies    No Known Allergies    Intolerances      Standing Inpatient Medications  sodium chloride 0.9%. 1000 milliLiter(s) IV Continuous <Continuous>  insulin lispro (HumaLOG) corrective regimen sliding scale   SubCutaneous three times a day before meals  insulin lispro (HumaLOG) corrective regimen sliding scale   SubCutaneous at bedtime  dextrose 5%. 1000 milliLiter(s) IV Continuous <Continuous>  dextrose 50% Injectable 12.5 Gram(s) IV Push once  dextrose 50% Injectable 25 Gram(s) IV Push once  dextrose 50% Injectable 25 Gram(s) IV Push once  influenza   Vaccine 0.5 milliLiter(s) IntraMuscular once    PRN Inpatient Medications  dextrose Gel 1 Dose(s) Oral once PRN  glucagon  Injectable 1 milliGRAM(s) IntraMuscular once PRN  oxyCODONE    5 mG/acetaminophen 325 mG 1 Tablet(s) Oral every 6 hours PRN  melatonin 3 milliGRAM(s) Oral at bedtime PRN      REVIEW OF SYSTEMS  --------------------------------------------------------------------------------  General: no fever  CVS: no chest pain  RESP: no sob, no cough  ABD: no abdominal pain  : no dysuria,  MSK: no edema     VITALS/PHYSICAL EXAM  --------------------------------------------------------------------------------  T(C): 36.8 (09-28-17 @ 05:39), Max: 38.4 (09-27-17 @ 19:27)  HR: 97 (09-28-17 @ 05:39) (92 - 109)  BP: 134/75 (09-28-17 @ 05:39) (132/72 - 150/95)  RR: 17 (09-28-17 @ 05:39) (16 - 17)  SpO2: 100% (09-28-17 @ 05:39) (97% - 100%)  Wt(kg): --        Physical Exam:  	Gen: NAD  	HEENT: MMM  	Pulm: CTA B/L  	CV: S1S2  	Abd: Soft, +BS   	Ext: No LE edema B/L  	Neuro: Awake, alert  	Skin: Warm and dry    LABS/STUDIES  --------------------------------------------------------------------------------              8.1    3.71  >-----------<  178      [09-28-17 @ 06:30]              25.1     141  |  109  |  46  ----------------------------<  91      [09-28-17 @ 06:30]  4.4   |  19  |  4.11        Ca     11.4     [09-28-17 @ 06:30]      Mg     1.3     [09-28-17 @ 06:30]      Phos  4.5     [09-28-17 @ 06:30]    TPro  6.3  /  Alb  x   /  TBili  x   /  DBili  x   /  AST  x   /  ALT  x   /  AlkPhos  x   [09-27-17 @ 06:30]        CK 10      [09-27-17 @ 06:30]        [09-27-17 @ 06:30]    Creatinine Trend:  SCr 4.11 [09-28 @ 06:30]  SCr 4.30 [09-27 @ 06:30]  SCr 4.61 [09-27 @ 00:40]  SCr 5.21 [09-26 @ 07:35]  SCr 5.65 [09-25 @ 23:40]    Urinalysis - [09-26-17 @ 15:02]      Color PLYEL / Appearance CLEAR / SG 1.010 / pH 6.5      Gluc 50 / Ketone NEGATIVE  / Bili NEGATIVE / Urobili NORMAL       Blood NEGATIVE / Protein 30 / Leuk Est NEGATIVE / Nitrite NEGATIVE      RBC 0-2 / WBC 0-2 / Hyaline  / Gran  / Sq Epi OCC / Non Sq Epi  / Bacteria     Urine Creatinine 35.32      [09-26-17 @ 08:34]  Urine Protein 22.7      [09-26-17 @ 09:21]  Urine Sodium 102      [09-26-17 @ 08:34]    Iron 36, TIBC 202, %sat --      [09-27-17 @ 06:30]  Ferritin 112.8      [09-27-17 @ 06:30]  PTH 8.12 (Ca --)      [09-26-17 @ 07:35]   --  Vitamin D (25OH) 12.3      [09-26-17 @ 07:35]  HbA1c 6.9      [09-27-17 @ 06:30]      ANCA: cANCA Negative, pANCA Negative, atypical ANCA --      [09-26-17 @ 09:09]  PR3-ANCA <5.0, interpretation: --      [09-26-17 @ 09:09]  Free Light Chains: kappa 17.40, lambda 8.98, ratio = --      [09-26 @ 09:09]

## 2017-09-28 NOTE — PROGRESS NOTE ADULT - PROBLEM SELECTOR PLAN 1
Pt presenting with severe hypercalcemia in the setting of known sarcoidosis. Pt with Vit D 1,25 505 with otherwise normal hypercalemia w/u. Improving with IV hydration  -c/w NS  to 100cc/hr  -Strict I/Os  -Pt will benefit from steroids given elevated Vit D but will have to first r/o Lymphoma as a cause of elevated Vit D 1,25 Spiked a fever to 101.1 overnight. Episode similar to fever and chills she has been experiencing at home over the last month.   -Draw blood cx  -RVP  -f/u urine cx

## 2017-09-28 NOTE — PROGRESS NOTE ADULT - SUBJECTIVE AND OBJECTIVE BOX
LEONEL SIRIA  [id number]    INTERVAL HPI/OVERNIGHT EVENTS:  Fever (Tm 101.1) and chills overnight   AM core biopsy of lymph node(s) performed by Interventional Radiology    MEDICATIONS  (STANDING):  sodium chloride 0.9%. 1000 milliLiter(s) (100 mL/Hr) IV Continuous <Continuous>  insulin lispro (HumaLOG) corrective regimen sliding scale   SubCutaneous three times a day before meals  insulin lispro (HumaLOG) corrective regimen sliding scale   SubCutaneous at bedtime  dextrose 5%. 1000 milliLiter(s) (50 mL/Hr) IV Continuous <Continuous>  dextrose 50% Injectable 12.5 Gram(s) IV Push once  dextrose 50% Injectable 25 Gram(s) IV Push once  dextrose 50% Injectable 25 Gram(s) IV Push once  influenza   Vaccine 0.5 milliLiter(s) IntraMuscular once    MEDICATIONS  (PRN):  dextrose Gel 1 Dose(s) Oral once PRN Blood Glucose LESS THAN 70 milliGRAM(s)/deciliter  glucagon  Injectable 1 milliGRAM(s) IntraMuscular once PRN Glucose LESS THAN 70 milligrams/deciliter  oxyCODONE    5 mG/acetaminophen 325 mG 1 Tablet(s) Oral every 6 hours PRN Severe Pain (7 - 10)  melatonin 3 milliGRAM(s) Oral at bedtime PRN Insomnia    Allergies: No Known Allergies    Review of Systems:   General:   HEENT:   CVS:   Resp:   GI:   MSK:   Skin:   Neuro:       Vital Signs Last 24 Hrs  T(C): 36.8 (28 Sep 2017 05:39), Max: 38.4 (27 Sep 2017 19:27)  T(F): 98.3 (28 Sep 2017 05:39), Max: 101.1 (27 Sep 2017 19:27)  HR: 97 (28 Sep 2017 05:39) (92 - 109)  BP: 134/75 (28 Sep 2017 05:39) (132/72 - 150/95)  RR: 17 (28 Sep 2017 05:39) (16 - 17)  SpO2: 100% (28 Sep 2017 05:39) (97% - 100%)    Physical Exam:  General:  HEENT:   Cardio:   Resp:   GI:   MSK:  Neuro:   Psych:     LABS:                        8.1    3.71  )-----------( 178      ( 28 Sep 2017 06:30 )             25.1   - Auto Neutrophil # 1.71  - Auto Eosinophil # 0.47  - Auto Neutrophil % 46.0  - Auto Eosinophil % 12.7        141  |  109<H>  |  46<H>  ----------------------------<  91  4.4   |  19<L>  |  4.11<H>    Ca    11.4<H>      28 Sep 2017 06:30  Phos  4.5       Mg     1.3         TPro  6.3  /  Alb  x   /  TBili  x   /  DBili  x   /  AST  x   /  ALT  x   /  AlkPhos  x       [place] Free Light Chains, Serum: 17.40 mg/dL [0.3-1.9] (17 @ 09:09)  [place] Free Light Chains, Serum: 8.98 mg/dL [0.6-2.6] (17 @ 09:09)    Neutrophil Cytoplasmic Antibody (17 @ 09:09)    Cytoplasmic (c-ANCA) Antibody: Negative    Perinuclear (p-ANCA) Antibody: Negative  Proteinase 3 Antibody Assay: <5.0 Units (17 @ 09:09)    HIV-1/2 Antigen/Antibody Screen by CMIA (17 @ 06:30)    HIV-1/2 Combo Result: Nonreactive     Urinalysis Basic - ( 26 Sep 2017 15:02 )    Color: PLYEL / Appearance: CLEAR / S.010 / pH: 6.5  Gluc: 50 / Ketone: NEGATIVE  / Bili: NEGATIVE / Urobili: NORMAL E.U.   Blood: NEGATIVE / Protein: 30 / Nitrite: NEGATIVE   Leuk Esterase: NEGATIVE / RBC: 0-2 / WBC 0-2   Sq Epi: OCC / Non Sq Epi: x / Bacteria: x\    RADIOLOGY & ADDITIONAL TESTS: LEONEL BEVERLY  [id number]    INTERVAL HPI/OVERNIGHT EVENTS:  Fever (Tm 101.1) and chills overnight    MEDICATIONS  (STANDING):  sodium chloride 0.9%. 1000 milliLiter(s) (100 mL/Hr) IV Continuous <Continuous>  insulin lispro (HumaLOG) corrective regimen sliding scale   SubCutaneous three times a day before meals  insulin lispro (HumaLOG) corrective regimen sliding scale   SubCutaneous at bedtime  dextrose 5%. 1000 milliLiter(s) (50 mL/Hr) IV Continuous <Continuous>  dextrose 50% Injectable 12.5 Gram(s) IV Push once  dextrose 50% Injectable 25 Gram(s) IV Push once  dextrose 50% Injectable 25 Gram(s) IV Push once  influenza   Vaccine 0.5 milliLiter(s) IntraMuscular once    MEDICATIONS  (PRN):  dextrose Gel 1 Dose(s) Oral once PRN Blood Glucose LESS THAN 70 milliGRAM(s)/deciliter  glucagon  Injectable 1 milliGRAM(s) IntraMuscular once PRN Glucose LESS THAN 70 milligrams/deciliter  oxyCODONE    5 mG/acetaminophen 325 mG 1 Tablet(s) Oral every 6 hours PRN Severe Pain (7 - 10)  melatonin 3 milliGRAM(s) Oral at bedtime PRN Insomnia    Allergies: No Known Allergies    Review of Systems:   General: Fatigue. Overnight fever, chills, night sweats. Felt similar to at home, though she says usually at home she does not have an objective fever (i.e. usually her measured temp not elevated)  HEENT: No tears, no vision changes. Throat dry/painful in setting of NPO, improved w ice  CVS: No CP, no palpitations  Resp: Dry cough. No SOB.    GI: No abd pain, no nausea, no vomiting. LBM .  MSK: Foot pain overnight, resolved as of this interview  Skin: No new rash; no itching  Neuro: Headache earlier but resolved as of this interview. No focal weakness, no paresthesias.       Vital Signs Last 24 Hrs  T(C): 36.8 (28 Sep 2017 05:39), Max: 38.4 (27 Sep 2017 19:27)  T(F): 98.3 (28 Sep 2017 05:39), Max: 101.1 (27 Sep 2017 19:27)  HR: 97 (28 Sep 2017 05:39) (92 - 109)  BP: 134/75 (28 Sep 2017 05:39) (132/72 - 150/95)  RR: 17 (28 Sep 2017 05:39) (16 - 17)  SpO2: 100% (28 Sep 2017 05:39) (97% - 100%)    Physical Exam:  General: No acute distress  HEENT: Pupils equal-sized  Lymph: Palpable lymphadenopathy pre-auricular, submandibular (L), posterior cervical, supraclavicular, axillary  Cardio: S1 S2  Resp: Adequate air movement bilaterally. Bibasilar crackles.  Abd: Soft, non-tender  MSK: Feet non-tender, calves non-tender  Neuro: Moves all four extremities  Psych: Pleasant, coherent, goal-oriented    LABS:                        8.1    3.71  )-----------( 178      ( 28 Sep 2017 06:30 )             25.1   - Auto Neutrophil # 1.71  - Auto Eosinophil # 0.47  - Auto Neutrophil % 46.0  - Auto Eosinophil % 12.7        141  |  109<H>  |  46<H>  ----------------------------<  91  4.4   |  19<L>  |  4.11<H>    Ca    11.4<H>      28 Sep 2017 06:30  Phos  4.5       Mg     1.3         TPro  6.3  /  Alb  x   /  TBili  x   /  DBili  x   /  AST  x   /  ALT  x   /  AlkPhos  x       [place] Free Light Chains, Serum: 17.40 mg/dL [0.3-1.9] (17 @ 09:09)  [place] Free Light Chains, Serum: 8.98 mg/dL [0.6-2.6] (17 @ 09:09)    Neutrophil Cytoplasmic Antibody (17 @ 09:09)    Cytoplasmic (c-ANCA) Antibody: Negative    Perinuclear (p-ANCA) Antibody: Negative  Proteinase 3 Antibody Assay: <5.0 Units (17 @ 09:09)    HIV-1/2 Antigen/Antibody Screen by CMIA (17 @ 06:30)    HIV-1/2 Combo Result: Nonreactive     Urinalysis Basic - ( 26 Sep 2017 15:02 )    Color: PLYEL / Appearance: CLEAR / S.010 / pH: 6.5  Gluc: 50 / Ketone: NEGATIVE  / Bili: NEGATIVE / Urobili: NORMAL E.U.   Blood: NEGATIVE / Protein: 30 / Nitrite: NEGATIVE   Leuk Esterase: NEGATIVE / RBC: 0-2 / WBC 0-2   Sq Epi: OCC / Non Sq Epi: x / Bacteria: x\    RADIOLOGY & ADDITIONAL TESTS:

## 2017-09-28 NOTE — PROVIDER CONTACT NOTE (OTHER) - ASSESSMENT
VS as charted, MD aware.  O2 sat decreased to 85% on RA with tachycardia noted, sinus tachy to 120's.

## 2017-09-28 NOTE — PROGRESS NOTE ADULT - SUBJECTIVE AND OBJECTIVE BOX
Patient is a 47y old  Female who presents with a chief complaint of High calcium and my kidney sent by doctor (26 Sep 2017 16:12)        SUBJECTIVE / OVERNIGHT EVENTS:Pt febrile to 101.1 overnight. PT otherwise aymptomatic. No acute complaints.       MEDICATIONS  (STANDING):  sodium chloride 0.9%. 1000 milliLiter(s) (100 mL/Hr) IV Continuous <Continuous>  insulin lispro (HumaLOG) corrective regimen sliding scale   SubCutaneous three times a day before meals  insulin lispro (HumaLOG) corrective regimen sliding scale   SubCutaneous at bedtime  dextrose 5%. 1000 milliLiter(s) (50 mL/Hr) IV Continuous <Continuous>  dextrose 50% Injectable 12.5 Gram(s) IV Push once  dextrose 50% Injectable 25 Gram(s) IV Push once  dextrose 50% Injectable 25 Gram(s) IV Push once  influenza   Vaccine 0.5 milliLiter(s) IntraMuscular once    MEDICATIONS  (PRN):  dextrose Gel 1 Dose(s) Oral once PRN Blood Glucose LESS THAN 70 milliGRAM(s)/deciliter  glucagon  Injectable 1 milliGRAM(s) IntraMuscular once PRN Glucose LESS THAN 70 milligrams/deciliter  oxyCODONE    5 mG/acetaminophen 325 mG 1 Tablet(s) Oral every 6 hours PRN Severe Pain (7 - 10)  melatonin 3 milliGRAM(s) Oral at bedtime PRN Insomnia        CAPILLARY BLOOD GLUCOSE  146 (27 Sep 2017 21:49)  180 (27 Sep 2017 16:52)  134 (27 Sep 2017 12:10)  89 (27 Sep 2017 08:25)        I&O's Summary      PHYSICAL EXAM  GENERAL: NAD, well-developed  HEAD:  Atraumatic, Normocephalic  EYES: EOMI, PERRLA, conjunctiva and sclera clear  NECK: Supple, No JVD  CHEST/LUNG: Clear to auscultation bilaterally; No wheeze  HEART: Regular rate and rhythm; No murmurs, rubs, or gallops  ABDOMEN: Soft, Nontender, Nondistended; Bowel sounds present  EXTREMITIES:  2+ Peripheral Pulses, No clubbing, cyanosis, or edema,axillary LAD  PSYCH: AAOx3  SKIN: No rashes or lesions    LABS:                        8.1    3.71  )-----------( 178      ( 28 Sep 2017 06:30 )             25.1     09-27    139  |  107  |  55<H>  ----------------------------<  80  4.5   |  17<L>  |  4.30<H>    Ca    11.5<H>      27 Sep 2017 06:30    TPro  6.3  /  Alb  x   /  TBili  x   /  DBili  x   /  AST  x   /  ALT  x   /  AlkPhos  x         CARDIAC MARKERS ( 27 Sep 2017 06:30 )  x     / x     / 10 u/L / x     / x          Urinalysis Basic - ( 26 Sep 2017 15:02 )    Color: PLYEL / Appearance: CLEAR / S.010 / pH: 6.5  Gluc: 50 / Ketone: NEGATIVE  / Bili: NEGATIVE / Urobili: NORMAL E.U.   Blood: NEGATIVE / Protein: 30 / Nitrite: NEGATIVE   Leuk Esterase: NEGATIVE / RBC: 0-2 / WBC 0-2   Sq Epi: OCC / Non Sq Epi: x / Bacteria: x        RADIOLOGY & ADDITIONAL TESTS:    Imaging Personally Reviewed:  Consultant(s) Notes Reviewed:  Surgery,Rheumatology  Care Discussed with Consultants/Other Providers: Patient is a 47y old  Female who presents with a chief complaint of High calcium and my kidney sent by doctor (26 Sep 2017 16:12)        SUBJECTIVE / OVERNIGHT EVENTS:Pt febrile to 101.1 overnight. PT otherwise aymptomatic. Pt reports having chills.       MEDICATIONS  (STANDING):  sodium chloride 0.9%. 1000 milliLiter(s) (100 mL/Hr) IV Continuous <Continuous>  insulin lispro (HumaLOG) corrective regimen sliding scale   SubCutaneous three times a day before meals  insulin lispro (HumaLOG) corrective regimen sliding scale   SubCutaneous at bedtime  dextrose 5%. 1000 milliLiter(s) (50 mL/Hr) IV Continuous <Continuous>  dextrose 50% Injectable 12.5 Gram(s) IV Push once  dextrose 50% Injectable 25 Gram(s) IV Push once  dextrose 50% Injectable 25 Gram(s) IV Push once  influenza   Vaccine 0.5 milliLiter(s) IntraMuscular once    MEDICATIONS  (PRN):  dextrose Gel 1 Dose(s) Oral once PRN Blood Glucose LESS THAN 70 milliGRAM(s)/deciliter  glucagon  Injectable 1 milliGRAM(s) IntraMuscular once PRN Glucose LESS THAN 70 milligrams/deciliter  oxyCODONE    5 mG/acetaminophen 325 mG 1 Tablet(s) Oral every 6 hours PRN Severe Pain (7 - 10)  melatonin 3 milliGRAM(s) Oral at bedtime PRN Insomnia        CAPILLARY BLOOD GLUCOSE  146 (27 Sep 2017 21:49)  180 (27 Sep 2017 16:52)  134 (27 Sep 2017 12:10)  89 (27 Sep 2017 08:25)        I&O's Summary      PHYSICAL EXAM  GENERAL: NAD, well-developed  HEAD:  Atraumatic, Normocephalic  EYES: EOMI, PERRLA, conjunctiva and sclera clear  NECK: Supple, No JVD  CHEST/LUNG: Clear to auscultation bilaterally; No wheeze  HEART: Regular rate and rhythm; No murmurs, rubs, or gallops  ABDOMEN: Soft, Nontender, Nondistended; Bowel sounds present  EXTREMITIES:  2+ Peripheral Pulses, No clubbing, cyanosis, or edema,axillary LAD  PSYCH: AAOx3  SKIN: No rashes or lesions    LABS:                        8.1    3.71  )-----------( 178      ( 28 Sep 2017 06:30 )             25.1     -    139  |  107  |  55<H>  ----------------------------<  80  4.5   |  17<L>  |  4.30<H>    Ca    11.5<H>      27 Sep 2017 06:30    TPro  6.3  /  Alb  x   /  TBili  x   /  DBili  x   /  AST  x   /  ALT  x   /  AlkPhos  x   09      CARDIAC MARKERS ( 27 Sep 2017 06:30 )  x     / x     / 10 u/L / x     / x          Urinalysis Basic - ( 26 Sep 2017 15:02 )    Color: PLYEL / Appearance: CLEAR / S.010 / pH: 6.5  Gluc: 50 / Ketone: NEGATIVE  / Bili: NEGATIVE / Urobili: NORMAL E.U.   Blood: NEGATIVE / Protein: 30 / Nitrite: NEGATIVE   Leuk Esterase: NEGATIVE / RBC: 0-2 / WBC 0-2   Sq Epi: OCC / Non Sq Epi: x / Bacteria: x        RADIOLOGY & ADDITIONAL TESTS:    Imaging Personally Reviewed:  Consultant(s) Notes Reviewed:  Surgery,Rheumatology  Care Discussed with Consultants/Other Providers:

## 2017-09-28 NOTE — PROGRESS NOTE ADULT - ASSESSMENT
47yoF hx of sarcoidosis (dx 2010 via biopsy showing noncaseating granulomas not on any treatment), T2DM, sent in from outside rheumatologist for abnormal labs showing MIKALA and hypercalcemia, in setting of subacute-to-acute sx/sx including weight loss, night sweats, and dyspnea on exertion. Pt on admission (9/25) had Ca+ 15.3, creatinine 5.65, diffuse lymphadenopathy on physical exam and CT chest. As of 9/28, on IV fluids Ca improved to 11.4, Cr improved to 4.1.   Per Gen Surg and primary team, plan is first to perform core biopsy, though with goal of ruling out malignancy prior to starting steroid treatment, rheumatology recommendation is to perform excisional biopsy (to be re-discussed with Dr. Jackson).     PLAN  1) Hypercalcemia- differential includes sarcoidosis vs. malignancy such as MM/lymphoma. Pt has diagnosed sarcoid not on any treatment  HyperCa+ can be due to excess Vit D production by granulomas. However, she should also undergo w/u to rule out multiple myeloma  - Vit D 25-OH low, 1-25OH high, ACE level pending (ACE elevated as outpatient 9/22)  - SPEP w increased polyclonal gamma fraction, UPEP w bands in gamma region  - Immunofixation pending (as outpatient 9/22, showed no monoclonal proteins in either Urine immunofixation or Serum immunofixation)  - CT Chest diffuse adenopathy, bibasilar pulmonary fibrosis, no pulmonary nodules  - Immunoglobulins IgA ULN, IgG ULN, IgM wnl  - ESR 40 (above age-adjusted ULN), CRP 10.9  - Kappa:Lambda ratio <2:1  - HIV negative      2) MIKALA - MIKALA may be 2/2 hypercalcemia or AIN 2/2 NSAID use. Pt also notes recent Abx use for UTI treatment  - UA appears non-infectious; Urine culture collected  - c/w IV fluids  - US renal w/o hydronephrosis, with parenchyma echogenic  - If improvement stops, will consider steroids  - In anticipation of possible steroid use, follow up Quant Gold (negative as outpatient 9/22/17), and consider checking Acute Hepatitis Panel (negative as outpatient 9/22/17)    3) Anemia- pt has microcytic anemia w Retic Index 0.5%, likely failure of production (low EPO vs lymphoma vs parvovirus infection vs other)  - Fe wnl, TIBC wnl, UIBC wnl, Ferritin wnl, LDH low, haptoglobin wnl    4) Progressive ENGLISH - concern for pulm vs CARDIAC sarcoid vs possible pericardial effusion 2/2 renal failure/uremia  - Follow up TTE     5) Fever, chills, night sweats, weight loss; diffuse lymphadenopathy; hx of R groin lymph-node bx (12/2014) revealing granulomatous lymphadenitis, AFB neg  - Recommend repeat excisional biopsy of peripheral lymph node(s), with analyses to include AFB staining    will follow

## 2017-09-28 NOTE — CONSULT NOTE ADULT - ASSESSMENT
47 Y.O f w/ sarcoidosis p/w B symptoms found with lymphadenopathy consulted to r.o underlying malignancy 47 Y.O f w/ sarcoidosis p/w B symptoms, hypercalcemia and ARF found with lymphadenopathy on imaging consulted to r.o underlying malignancy

## 2017-09-28 NOTE — PROGRESS NOTE ADULT - PROBLEM SELECTOR PLAN 1
Pt. with most likely hemodynamically mediated MIKALA in setting of hypercalcemia and recent NSAID use. Scr improved to 4.1 today from 4.3 yesterday. Patient reports voiding well. Continue with IVF as tolerated. Monitor labs and urine output. Avoid nephrotoxins, NSAIDs, and RCA Pt. with most likely hemodynamically mediated MIKALA in setting of hypercalcemia and recent NSAID use. Scr decreased to 4.1 today from 4.3 yesterday. Patient reports voiding well. Continue with IVF as tolerated. Monitor labs and urine output. Avoid nephrotoxins, NSAIDs, and RCA

## 2017-09-28 NOTE — DIETITIAN INITIAL EVALUATION ADULT. - PROBLEM SELECTOR PLAN 2
differential includes Jose 2/2 malignancy(myeloma) vs prerenal 2/2 hypercalcemia vs NSAID induced nephropathy  -Renal Sono  -SPEP/UPEP,Immunofixation,FLC  -Strict I/OS  -Nephrology consult   -Hold nephrotixins

## 2017-09-28 NOTE — CONSULT NOTE ADULT - PROBLEM SELECTOR RECOMMENDATION 9
- broad differential diagnosis, infection, vs inflammation/sarcoidosis vs malignancy  - would consider pulm consult regarding prior hx of sarcoid. consider ACE level?  - check LDH, uric acid, HIV, hepatits  - if planning for lymph node biopsy would suggest excisional lN biopsy to evaluate full architecture. - broad differential diagnosis, infection, vs inflammation/sarcoidosis vs malignancy  - would consider pulm consult regarding prior hx of sarcoid. consider ACE level?  - check LDH, uric acid, HIV, hepatits  - if planning for lymph node biopsy would suggest excisional lN biopsy to evaluate full architecture. ?axillary area may be most approachable. - broad differential diagnosis, infection, vs inflammation/sarcoidosis vs malignancy  - would consider pulm consult regarding prior hx of sarcoid. consider ACE level?  - check LDH, uric acid, HIV, hepatits  - if planning for repeat lymph node biopsy would suggest excisional lN biopsy to evaluate full architecture. ?axillary area may be most approachable. will follow up path of needle biopsy prior to proceeding with further invasive procedures.  - Ct abd and pelvis with contrast to evaluate extent of lymphadenopathy

## 2017-09-28 NOTE — CONSULT NOTE ADULT - PROBLEM SELECTOR RECOMMENDATION 2
- appreciate work up thus far  - hypercalcemia improved from admission. agree with fluids  - may be related to sarcoid vs underlying lymphadenopathy - appreciate work up thus far  - hypercalcemia improved from admission. agree with fluids  - may be related to sarcoid vs underlying lymphadenopathy  -consider parathyroid like related peptide level

## 2017-09-28 NOTE — PROGRESS NOTE ADULT - PROBLEM SELECTOR PLAN 2
Given history of fever, night sweats, weight loss, and several palpable lymph nodes in the axilla, supraclavicular, preauricular, and cervical regions, need to rule out lymphoma.  -Surgery to perform core needle biopsy   -will consider consulting med onc Pt presenting with severe hypercalcemia in the setting of known sarcoidosis. Pt with Vit D 1,25 505 with otherwise normal hypercalemia w/u. Improving with IV hydration  -c/w NS  to 100cc/hr  -Strict I/Os  -Pt will benefit from steroids given elevated Vit D but will have to first r/o Lymphoma as a cause of elevated Vit D 1,25

## 2017-09-28 NOTE — PROGRESS NOTE ADULT - PROBLEM SELECTOR PLAN 5
Unclear etiology but pt now has two cell lines (WBC, RBC) affected concerning for primary marrow issue  -Will consider hematology consult. Normocytic/microcytic anemia (w nmL iron studies) likely 2/2 insufficient marrow production. Reticulocyte count showing no marrow response.   -Monitor CBC daily  -Will consider alternative diagnoses for pts presentation such as lymphoma,MM

## 2017-09-28 NOTE — PROGRESS NOTE ADULT - PROBLEM SELECTOR PLAN 2
Pt. with mild hyperkalemia in setting of renal failure. Serum potassium improved to 4.4 today. Low potassium diet. Monitor serum potassium

## 2017-09-28 NOTE — PROGRESS NOTE ADULT - PROBLEM SELECTOR PLAN 4
Unclear etiology but pt now has two cell lines affected concerning for primary marrow issue  -Will consider hematology consult.

## 2017-09-28 NOTE — PROGRESS NOTE ADULT - PROBLEM SELECTOR PLAN 4
Normocytic/microcytic anemia (w nmL iron studies) likely 2/2 insufficient marrow production. Reticulocyte count showing no marrow response.   -Monitor CBC daily  -Will consider alternative diagnoses for pts presentation such as lymphoma,MM Differential includes MIKALA 2/2 malignancy(myeloma) vs 2/2 hypercalcemia vs NSAID induced nephropathy. Renal Sono showing medical renal disease. SPEP findings c/w chronic inflamation,UPEP showing gamma band  -Immunofixation,FLC  -Strict I/OS  -Nephrology consult appreciated   -Hold nephrotoxins

## 2017-09-28 NOTE — PROGRESS NOTE ADULT - PROBLEM SELECTOR PLAN 3
Pt. with hypercalcemia in setting of sarcoidosis vs ?malignancy. Serum calcium decreased to 11.4 today. Continue IVF. Monitor serum calcium Pt. with hypercalcemia in setting of sarcoidosis/lymphadenopathy. Serum calcium decreased to 11.4 today. Continue IVF. Monitor serum calcium

## 2017-09-28 NOTE — PROGRESS NOTE ADULT - PROBLEM SELECTOR PLAN 2
differential includes Jose 2/2 malignancy(myeloma) vs 2/2 hypercalcemia vs NSAID induced nephropathy. Renal Sono showing medical renal disease. SPEP findings c/w chronic inflamation,UPEP showing gamma band  -Immunofixation,FLC  -Strict I/OS  -Nephrology consult appreciated   -Hold nephrotoxins

## 2017-09-29 DIAGNOSIS — R06.00 DYSPNEA, UNSPECIFIED: ICD-10-CM

## 2017-09-29 DIAGNOSIS — A41.9 SEPSIS, UNSPECIFIED ORGANISM: ICD-10-CM

## 2017-09-29 DIAGNOSIS — J96.91 RESPIRATORY FAILURE, UNSPECIFIED WITH HYPOXIA: ICD-10-CM

## 2017-09-29 LAB
ALBUMIN SERPL ELPH-MCNC: 3.2 G/DL — LOW (ref 3.3–5)
ALP SERPL-CCNC: 54 U/L — SIGNIFICANT CHANGE UP (ref 40–120)
ALT FLD-CCNC: 6 U/L — SIGNIFICANT CHANGE UP (ref 4–33)
AST SERPL-CCNC: 12 U/L — SIGNIFICANT CHANGE UP (ref 4–32)
BASOPHILS # BLD AUTO: 0.02 K/UL — SIGNIFICANT CHANGE UP (ref 0–0.2)
BASOPHILS NFR BLD AUTO: 0.4 % — SIGNIFICANT CHANGE UP (ref 0–2)
BILIRUB SERPL-MCNC: 0.3 MG/DL — SIGNIFICANT CHANGE UP (ref 0.2–1.2)
BUN SERPL-MCNC: 38 MG/DL — HIGH (ref 7–23)
CALCIUM SERPL-MCNC: 11.7 MG/DL — HIGH (ref 8.4–10.5)
CHLORIDE SERPL-SCNC: 105 MMOL/L — SIGNIFICANT CHANGE UP (ref 98–107)
CO2 SERPL-SCNC: 16 MMOL/L — LOW (ref 22–31)
CREAT SERPL-MCNC: 3.75 MG/DL — HIGH (ref 0.5–1.3)
EOSINOPHIL # BLD AUTO: 0.49 K/UL — SIGNIFICANT CHANGE UP (ref 0–0.5)
EOSINOPHIL NFR BLD AUTO: 9.7 % — HIGH (ref 0–6)
GLUCOSE SERPL-MCNC: 84 MG/DL — SIGNIFICANT CHANGE UP (ref 70–99)
HCT VFR BLD CALC: 27.8 % — LOW (ref 34.5–45)
HGB BLD-MCNC: 9.2 G/DL — LOW (ref 11.5–15.5)
IMM GRANULOCYTES # BLD AUTO: 0.04 # — SIGNIFICANT CHANGE UP
IMM GRANULOCYTES NFR BLD AUTO: 0.8 % — SIGNIFICANT CHANGE UP (ref 0–1.5)
KAPPA/LAMBDA FREE LIGHT CHAIN RATIO, SERUM: 1.94 — SIGNIFICANT CHANGE UP
LYMPHOCYTES # BLD AUTO: 0.8 K/UL — LOW (ref 1–3.3)
LYMPHOCYTES # BLD AUTO: 15.9 % — SIGNIFICANT CHANGE UP (ref 13–44)
M TB TUBERC IFN-G BLD QL: 0 IU/ML — SIGNIFICANT CHANGE UP
M TB TUBERC IFN-G BLD QL: 0.2 IU/ML — SIGNIFICANT CHANGE UP
M TB TUBERC IFN-G BLD QL: NEGATIVE — SIGNIFICANT CHANGE UP
MAGNESIUM SERPL-MCNC: 1.8 MG/DL — SIGNIFICANT CHANGE UP (ref 1.6–2.6)
MCHC RBC-ENTMCNC: 26.2 PG — LOW (ref 27–34)
MCHC RBC-ENTMCNC: 33.1 % — SIGNIFICANT CHANGE UP (ref 32–36)
MCV RBC AUTO: 79.2 FL — LOW (ref 80–100)
MITOGEN IGNF BCKGRD COR BLD-ACNC: 0.71 IU/ML — SIGNIFICANT CHANGE UP
MONOCYTES # BLD AUTO: 0.53 K/UL — SIGNIFICANT CHANGE UP (ref 0–0.9)
MONOCYTES NFR BLD AUTO: 10.5 % — SIGNIFICANT CHANGE UP (ref 2–14)
NEUTROPHILS # BLD AUTO: 3.16 K/UL — SIGNIFICANT CHANGE UP (ref 1.8–7.4)
NEUTROPHILS NFR BLD AUTO: 62.7 % — SIGNIFICANT CHANGE UP (ref 43–77)
NRBC # FLD: 0 — SIGNIFICANT CHANGE UP
NT-PROBNP SERPL-SCNC: SIGNIFICANT CHANGE UP PG/ML
PHOSPHATE SERPL-MCNC: 4.4 MG/DL — SIGNIFICANT CHANGE UP (ref 2.5–4.5)
PLATELET # BLD AUTO: 187 K/UL — SIGNIFICANT CHANGE UP (ref 150–400)
PMV BLD: 9.2 FL — SIGNIFICANT CHANGE UP (ref 7–13)
POTASSIUM SERPL-MCNC: 4.7 MMOL/L — SIGNIFICANT CHANGE UP (ref 3.5–5.3)
POTASSIUM SERPL-SCNC: 4.7 MMOL/L — SIGNIFICANT CHANGE UP (ref 3.5–5.3)
PROT SERPL-MCNC: 7 G/DL — SIGNIFICANT CHANGE UP (ref 6–8.3)
RBC # BLD: 3.51 M/UL — LOW (ref 3.8–5.2)
RBC # FLD: 12.9 % — SIGNIFICANT CHANGE UP (ref 10.3–14.5)
SODIUM SERPL-SCNC: 139 MMOL/L — SIGNIFICANT CHANGE UP (ref 135–145)
SPECIMEN SOURCE: SIGNIFICANT CHANGE UP
SPECIMEN SOURCE: SIGNIFICANT CHANGE UP
URATE SERPL-MCNC: 7.7 MG/DL — HIGH (ref 2.5–7)
WBC # BLD: 5.04 K/UL — SIGNIFICANT CHANGE UP (ref 3.8–10.5)
WBC # FLD AUTO: 5.04 K/UL — SIGNIFICANT CHANGE UP (ref 3.8–10.5)

## 2017-09-29 PROCEDURE — 93306 TTE W/DOPPLER COMPLETE: CPT | Mod: 26

## 2017-09-29 PROCEDURE — 88189 FLOWCYTOMETRY/READ 16 & >: CPT

## 2017-09-29 PROCEDURE — 99233 SBSQ HOSP IP/OBS HIGH 50: CPT | Mod: GC

## 2017-09-29 PROCEDURE — 99231 SBSQ HOSP IP/OBS SF/LOW 25: CPT | Mod: GC

## 2017-09-29 PROCEDURE — 99223 1ST HOSP IP/OBS HIGH 75: CPT | Mod: GC

## 2017-09-29 PROCEDURE — 74176 CT ABD & PELVIS W/O CONTRAST: CPT | Mod: 26

## 2017-09-29 RX ORDER — VANCOMYCIN HCL 1 G
750 VIAL (EA) INTRAVENOUS ONCE
Qty: 0 | Refills: 0 | Status: COMPLETED | OUTPATIENT
Start: 2017-09-29 | End: 2017-09-29

## 2017-09-29 RX ORDER — HEPARIN SODIUM 5000 [USP'U]/ML
5000 INJECTION INTRAVENOUS; SUBCUTANEOUS EVERY 8 HOURS
Qty: 0 | Refills: 0 | Status: DISCONTINUED | OUTPATIENT
Start: 2017-09-29 | End: 2017-10-03

## 2017-09-29 RX ORDER — AMPICILLIN SODIUM AND SULBACTAM SODIUM 250; 125 MG/ML; MG/ML
1.5 INJECTION, POWDER, FOR SUSPENSION INTRAMUSCULAR; INTRAVENOUS EVERY 12 HOURS
Qty: 0 | Refills: 0 | Status: DISCONTINUED | OUTPATIENT
Start: 2017-09-30 | End: 2017-10-03

## 2017-09-29 RX ORDER — ACETAMINOPHEN 500 MG
650 TABLET ORAL ONCE
Qty: 0 | Refills: 0 | Status: COMPLETED | OUTPATIENT
Start: 2017-09-29 | End: 2017-09-29

## 2017-09-29 RX ORDER — FUROSEMIDE 40 MG
40 TABLET ORAL ONCE
Qty: 0 | Refills: 0 | Status: COMPLETED | OUTPATIENT
Start: 2017-09-29 | End: 2017-09-29

## 2017-09-29 RX ORDER — AMPICILLIN SODIUM AND SULBACTAM SODIUM 250; 125 MG/ML; MG/ML
1.5 INJECTION, POWDER, FOR SUSPENSION INTRAMUSCULAR; INTRAVENOUS ONCE
Qty: 0 | Refills: 0 | Status: COMPLETED | OUTPATIENT
Start: 2017-09-29 | End: 2017-09-29

## 2017-09-29 RX ORDER — AMPICILLIN SODIUM AND SULBACTAM SODIUM 250; 125 MG/ML; MG/ML
INJECTION, POWDER, FOR SUSPENSION INTRAMUSCULAR; INTRAVENOUS
Qty: 0 | Refills: 0 | Status: DISCONTINUED | OUTPATIENT
Start: 2017-09-29 | End: 2017-10-03

## 2017-09-29 RX ADMIN — Medication 1: at 22:31

## 2017-09-29 RX ADMIN — OXYCODONE AND ACETAMINOPHEN 1 TABLET(S): 5; 325 TABLET ORAL at 09:15

## 2017-09-29 RX ADMIN — OXYCODONE AND ACETAMINOPHEN 1 TABLET(S): 5; 325 TABLET ORAL at 10:15

## 2017-09-29 RX ADMIN — HEPARIN SODIUM 5000 UNIT(S): 5000 INJECTION INTRAVENOUS; SUBCUTANEOUS at 20:23

## 2017-09-29 RX ADMIN — Medication 150 MILLIGRAM(S): at 18:23

## 2017-09-29 RX ADMIN — Medication 650 MILLIGRAM(S): at 20:01

## 2017-09-29 RX ADMIN — AMPICILLIN SODIUM AND SULBACTAM SODIUM 100 GRAM(S): 250; 125 INJECTION, POWDER, FOR SUSPENSION INTRAMUSCULAR; INTRAVENOUS at 20:03

## 2017-09-29 RX ADMIN — Medication 40 MILLIGRAM(S): at 11:24

## 2017-09-29 RX ADMIN — Medication 50 MILLIGRAM(S): at 20:02

## 2017-09-29 NOTE — PROGRESS NOTE ADULT - SUBJECTIVE AND OBJECTIVE BOX
CONTACT INFO  David Metz M.D., PGY-1  Pager: [facility]- [phone], [facility]- [phone]    Mon-Fri: pager covered by day team 7am-7pm;   ***Academic conferences M-F 8am-9am & 12pm-1pm- page ONLY if URGENT or if Consultant  /[place]: see chart, primary physician assigned available 7am-12pm  Sat/[place] Coverage 12pm-7pm: [facility]- page [phone] for Team1-4, [facility]- pager forwarded to covering Resident  For Night coverage 7pm-7am: [facility]- page [phone] Team1-3, page [phone] Team4 & Care Model    LEONEL BEVERLY  47y  Female      Patient is a 47y old  Female who presents with a chief complaint of High calcium and my kidney sent by doctor (26 Sep 2017 16:12)      INTERVAL HPI/OVERNIGHT EVENTS: SOB last night with sat 85%, NF placed on NRB and patient's SOB resolved. Patient was unable to get V/Q scan as she was unable to lie flat due to SOB. Tachy overnight with tele showing sinus tach 130's. Also received percocet ON for complaint of headache. This AM patient seen and she is feeling well satting high 90's on 3L NC, no head pain reported.     REVIEW OF SYSTEMS:  CONSTITUTIONAL: No fever  EYES: No eye pain, visual disturbances, or discharge  RESPIRATORY: SOB resolved  CARDIOVASCULAR: No chest pain  GASTROINTESTINAL: No abdominal or epigastric pain. No diarrhea or constipation. No nausea, vomiting, or hematemesis.  GENITOURINARY: No dysuria, frequency, hematuria, or incontinence  NEUROLOGICAL: Headache overnight  SKIN: No itching, burning, rashes, or lesions   PSYCHIATRIC: No difficulty sleeping    T(C): 37.4 (09-29-17 @ 05:44), Max: 38.3 (09-28-17 @ 21:48)  HR: 103 (09-29-17 @ 05:44) (99 - 128)  BP: 166/94 (09-29-17 @ 05:44) (141/72 - 166/94)  RR: 17 (09-29-17 @ 05:44) (17 - 22)  SpO2: 98% (09-29-17 @ 05:44) (85% - 100%)  Wt(kg): --Vital Signs Last 24 Hrs  T(C): 37.4 (29 Sep 2017 05:44), Max: 38.3 (28 Sep 2017 21:48)  T(F): 99.3 (29 Sep 2017 05:44), Max: 100.9 (28 Sep 2017 21:48)  HR: 103 (29 Sep 2017 05:44) (99 - 128)  BP: 166/94 (29 Sep 2017 05:44) (141/72 - 166/94)  BP(mean): --  RR: 17 (29 Sep 2017 05:44) (17 - 22)  SpO2: 98% (29 Sep 2017 05:44) (85% - 100%)  Wt(kg): --    PHYSICAL EXAM:  GENERAL: NAD  HEAD:  Atraumatic, Normocephalic  ENMT: Moist mucous membranes  NERVOUS SYSTEM:  Alert & Oriented X3, Good concentration; moving all extremities, SILT distal extremities  CHEST/LUNG: Diffuse faint crackles and decreased breath sounds bilaterally  HEART: Tachycardic, regular, no m/r/g  ABDOMEN: Soft, Nontender, Nondistended; Bowel sounds present  EXTREMITIES:  Warm, no edema  LYMPH: Axillary FESTUS bilateral    Consultant(s) Notes Reviewed:  [x ] YES  [ ] NO  Care Discussed with Consultants/Other Providers [ x] YES  [ ] NO    LABS:                        8.1    3.71  )-----------( 178      ( 28 Sep 2017 06:30 )             25.1     09-28    141  |  109<H>  |  46<H>  ----------------------------<  91  4.4   |  19<L>  |  4.11<H>    Ca    11.4<H>      28 Sep 2017 06:30  Phos  4.5     09-28  Mg     1.3     09-28      PT/INR - ( 28 Sep 2017 09:51 )   PT: 12.0 SEC;   INR: 1.07          PTT - ( 28 Sep 2017 09:51 )  PTT:31.9 SEC    CAPILLARY BLOOD GLUCOSE  102 (28 Sep 2017 22:25)  98 (28 Sep 2017 16:32)  103 (28 Sep 2017 13:31)  84 (28 Sep 2017 08:38)    RADIOLOGY & ADDITIONAL TESTS:  CXR 9/28 - Official read pending; increased interstitial markings bilateral, loss of diaphragmatic angle on R, ?pleural effusion   Imaging Personally Reviewed:  [x] YES  [ ] NO

## 2017-09-29 NOTE — CONSULT NOTE ADULT - ASSESSMENT
46 yo F with PMHx of TIIDM and sarcoidosis who presented for hypercalcemia and MIKALA. The patient has many features of uncontrolled sarcoidosis and could be manifesting some of the cardiac and respiratory manifestations such as pulmonary fibrosis. The CT chest did not show extensive fibrotic change making ILD component lesss likely to be causing the dyspnea. Unclear cause of eosinophilia. Currently being worked up for possible hematologic malignancy.  -Addition of albuterol nebulizer for possible endobronchial sarcoid vs underlying obstructive processess  -diuresis with furosemide, however patient not currently volume overloaded  -spirometry to determine type of lung disease  -follow up with TTE to assess pulmon HTN, lymphnode biospy

## 2017-09-29 NOTE — PROGRESS NOTE ADULT - PROBLEM SELECTOR PLAN 1
Pt presenting with severe hypercalcemia in the setting of known sarcoidosis. Pt with Vit D 1,25 505 with otherwise normal hypercalemia w/u.  - c/w NS  to 100cc/hr  -Strict I/Os  -Pt will benefit from steroids given elevated Vit D but will have to first r/o Lymphoma as a cause of elevated Vit D 1,25  - Flow cytometry sent this AM Pt presenting with severe hypercalcemia in the setting of known sarcoidosis. Pt with Vit D 1,25 505 with otherwise normal hypercalemia w/u.  - Ca uptrended today to 11.7 on fluids  - on 100 cc/hr NS ON; dc this AM as patient is increasingly dyspneic and has crackles  -Strict I/Os  -Pt will benefit from steroids given elevated Vit D but will have to first r/o Lymphoma as a cause of elevated Vit D 1,25  - Flow cytometry sent this AM SOB overnight with desat to 85%. Differential includes volume overload vs pna vs PE vs progression of sarcoid.   - Most likely volume overload; IVF dc'd, 40 lasix given will monitor for response  - Wells score 3, moderate risk group but patient can not tolerate V/Q due to SOB and has MIKALA preventing CTA, will continue to monitor   - Pulm c/s for dyspnea and recommendations regarding sarcoid

## 2017-09-29 NOTE — CONSULT NOTE ADULT - ATTENDING COMMENTS
H/o sarcoidosis with worsening dyspnea.  CT chest is not H/o sarcoidosis with worsening dyspnea.  CT chest shows some fibrotic changes, but it does not explain fevers and worsening dyspnea. It is difficult to assess for progression of disease because there is no imaging to compare todays CT to. I would not treat with steroids for parenchymal changes, but understand that patient may need steroids to control hypercalcemia.  Would try BD(albuterol) to see if there is component of endobronchial sarcoid contributing to dyspnea symptoms although lack of wheezing on exam makes me doubt this possibility.  Fevers are unusual, will await LN biopsy results to assess for alternative diagnosis causing patient's symptoms.

## 2017-09-29 NOTE — PROGRESS NOTE ADULT - PROBLEM SELECTOR PLAN 8
DVT PPX:Improve Score .4%. Low risk  Dispo:pending work up Pt has been off treatment for over 3 years because she has not followed up with her pulmonologist  Rheum consult appreciated. Pt has hx of biopsy proven sarcoid in 2007 and LN Bx in 2014 showing sarcoid.  -Will obtain Echo given murmur   -Will defer steroids until diagnosis established  -F/u ACE level  -Surgery consult appreciated

## 2017-09-29 NOTE — PROGRESS NOTE ADULT - ASSESSMENT
47-year-old female with known history of sarcoidosis, DM presented to [facility] with complaints of weakness, back pain and LE pain. Pt. found to have MIKALA and hypercalcemia.

## 2017-09-29 NOTE — PROGRESS NOTE ADULT - PROBLEM SELECTOR PLAN 7
Pt has been off treatment for over 3 years because she has not followed up with her pulmonologist  Rheum consult appreciated. Pt has hx of biopsy proven sarcoid in 2007 and LN Bx in 2014 showing sarcoid.  -Will obtain Echo given murmur   -Will defer steroids until diagnosis established  -F/u ACE level  -Surgery consult appreciated Possible 2/2 sarcoid. ANCAs neg no other evidence of vasculitis clinically   -Trend CBC

## 2017-09-29 NOTE — CONSULT NOTE ADULT - SUBJECTIVE AND OBJECTIVE BOX
CHIEF COMPLAINT: Abnormal labs    HPI: 48 yo F with pmh of TIIDM, sarcoidosis with no treatment in last 3 years who presented fro rheumatologist for abnormal labs.   past 3 months she reports fevers/night sweats, 20 pounds weight loss, and fatigue. Pt admits to recent abx use for UTI and increased NSAID use for b.l Leg pain recently and headaches.  T 98.5F, HR 98/min, /70, RR 16/min, SpO2 100% on RA. She was found to have Ca 15.3 and creatinine 5.65. Pt was given 2L NS bolus. Serum calcium improved to 12.8 and serum creatinine to 5.2. (26 Sep 2017 08:27)    PAST MEDICAL & SURGICAL HISTORY:  Diabetes  Sarcoidosis  No significant past surgical history      FAMILY HISTORY:  Family history of type C viral hepatitis (Mother): Mother      SOCIAL HISTORY:  Smoking: [ ] Never Smoked [ ] Former Smoker (__ packs x ___ years) [ ] Current Smoker  (__ packs x ___ years)  Substance Use: [ ] Never Used [ ] Used ____  EtOH Use:  Marital Status: [ ] Single [ ]  [ ]  [ ]   Sexual History:   Occupation:  Recent Travel:  Country of Birth:  Advance Directives:    Allergies    No Known Allergies    Intolerances        HOME MEDICATIONS:    REVIEW OF SYSTEMS:  Constitutional: [ ] negative [ ] fevers [ ] chills [ ] weight loss [ ] weight gain  HEENT: [ ] negative [ ] dry eyes [ ] eye irritation [ ] postnasal drip [ ] nasal congestion  CV: [ ] negative  [ ] chest pain [ ] orthopnea [ ] palpitations [ ] murmur  Resp: [ ] negative [ ] cough [ ] shortness of breath [ ] dyspnea [ ] wheezing [ ] sputum [ ] hemoptysis  GI: [ ] negative [ ] nausea [ ] vomiting [ ] diarrhea [ ] constipation [ ] abd pain [ ] dysphagia   : [ ] negative [ ] dysuria [ ] nocturia [ ] hematuria [ ] increased urinary frequency  Musculoskeletal: [ ] negative [ ] back pain [ ] myalgias [ ] arthralgias [ ] fracture  Skin: [ ] negative [ ] rash [ ] itch  Neurological: [ ] negative [ ] headache [ ] dizziness [ ] syncope [ ] weakness [ ] numbness  Psychiatric: [ ] negative [ ] anxiety [ ] depression  Endocrine: [ ] negative [ ] diabetes [ ] thyroid problem  Hematologic/Lymphatic: [ ] negative [ ] anemia [ ] bleeding problem  Allergic/Immunologic: [ ] negative [ ] itchy eyes [ ] nasal discharge [ ] hives [ ] angioedema  [ ] All other systems negative  [ ] Unable to assess ROS because ________    OBJECTIVE:  ICU Vital Signs Last 24 Hrs  T(C): 37.7 (29 Sep 2017 14:06), Max: 38.3 (28 Sep 2017 21:48)  T(F): 99.9 (29 Sep 2017 14:06), Max: 100.9 (28 Sep 2017 21:48)  HR: 104 (29 Sep 2017 14:06) (103 - 128)  BP: 148/91 (29 Sep 2017 14:06) (141/72 - 166/94)  BP(mean): --  ABP: --  ABP(mean): --  RR: 19 (29 Sep 2017 14:06) (17 - 22)  SpO2: 100% (29 Sep 2017 14:06) (85% - 100%)        09-29 @ 07:01  -  09-29 @ 15:39  --------------------------------------------------------  IN: 0 mL / OUT: 1800 mL / NET: -1800 mL      CAPILLARY BLOOD GLUCOSE  102 (29 Sep 2017 12:23)          PHYSICAL EXAM:  General:   HEENT:   Lymph Nodes:  Neck:   Respiratory:   Cardiovascular:   Abdomen:   Extremities:   Skin:   Neurological:  Psychiatry:    HOSPITAL MEDICATIONS:  Standing Meds:  insulin lispro (HumaLOG) corrective regimen sliding scale   SubCutaneous three times a day before meals  insulin lispro (HumaLOG) corrective regimen sliding scale   SubCutaneous at bedtime  dextrose 5%. 1000 milliLiter(s) IV Continuous <Continuous>  dextrose 50% Injectable 12.5 Gram(s) IV Push once  dextrose 50% Injectable 25 Gram(s) IV Push once  dextrose 50% Injectable 25 Gram(s) IV Push once  influenza   Vaccine 0.5 milliLiter(s) IntraMuscular once  vancomycin  IVPB 750 milliGRAM(s) IV Intermittent once  ampicillin/sulbactam  IVPB      heparin  Injectable 5000 Unit(s) SubCutaneous every 8 hours      PRN Meds:  dextrose Gel 1 Dose(s) Oral once PRN  glucagon  Injectable 1 milliGRAM(s) IntraMuscular once PRN  oxyCODONE    5 mG/acetaminophen 325 mG 1 Tablet(s) Oral every 6 hours PRN  melatonin 3 milliGRAM(s) Oral at bedtime PRN      LABS:                        9.2    5.04  )-----------( 187      ( 29 Sep 2017 06:30 )             27.8     Hgb Trend: 9.2<--, 8.1<--, 8.1<--, 8.6<--, 9.7<--  09-29    139  |  105  |  38<H>  ----------------------------<  84  4.7   |  16<L>  |  3.75<H>    Ca    11.7<H>      29 Sep 2017 06:30  Phos  4.4     09-29  Mg     1.8     09-29    TPro  7.0  /  Alb  3.2<L>  /  TBili  0.3  /  DBili  x   /  AST  12  /  ALT  6   /  AlkPhos  54  09-29    Creatinine Trend: 3.75<--, 4.11<--, 4.30<--, 4.61<--, 5.21<--, 5.65<--  PT/INR - ( 28 Sep 2017 09:51 )   PT: 12.0 SEC;   INR: 1.07          PTT - ( 28 Sep 2017 09:51 )  PTT:31.9 SEC          MICROBIOLOGY:     RADIOLOGY:  [ ] Reviewed and interpreted by me    PULMONARY FUNCTION TESTS:    EKG: CHIEF COMPLAINT: Abnormal labs    HPI: 46 yo F with pmh of TIIDM, sarcoidosis with no treatment in last 3 years who presented fro rheumatologist for abnormal labs. The patient states that over the past 3 months she has been having worsening symptoms of fever, night sweats, weight loss, fatigue, dyspnea on exertion and so went to a rheumatologist who salomón labs and found that she was hypercalcemic with acute kidney injury so sent her to the ED.  In the ED she was found to have T 98.5F, HR 98/min, /70, RR 16/min, SpO2 100% on RA. She was found to have Ca 15.3 and creatinine 5.65. Pt was given 2L NS bolus. Serum calcium improved to 12.8 and serum creatinine to 5.2. (26 Sep 2017 08:27)  She was treated for hypercalcemia and MIKALA with IVF    PAST MEDICAL & SURGICAL HISTORY:  Diabetes  Sarcoidosis  No significant past surgical history      FAMILY HISTORY:  Family history of type C viral hepatitis (Mother): Mother      SOCIAL HISTORY:  Smoking: [ ] Never Smoked [ ] Former Smoker (__ packs x ___ years) [ ] Current Smoker  (__ packs x ___ years)  Substance Use: [ ] Never Used [ ] Used ____  EtOH Use:  Marital Status: [ ] Single [ ]  [ ]  [ ]   Sexual History:   Occupation:  Recent Travel:  Country of Birth:  Advance Directives:    Allergies    No Known Allergies    Intolerances        HOME MEDICATIONS:    REVIEW OF SYSTEMS:  Constitutional: [ ] negative [ ] fevers [ ] chills [ ] weight loss [ ] weight gain  HEENT: [ ] negative [ ] dry eyes [ ] eye irritation [ ] postnasal drip [ ] nasal congestion  CV: [ ] negative  [ ] chest pain [ ] orthopnea [ ] palpitations [ ] murmur  Resp: [ ] negative [ ] cough [ ] shortness of breath [ ] dyspnea [ ] wheezing [ ] sputum [ ] hemoptysis  GI: [ ] negative [ ] nausea [ ] vomiting [ ] diarrhea [ ] constipation [ ] abd pain [ ] dysphagia   : [ ] negative [ ] dysuria [ ] nocturia [ ] hematuria [ ] increased urinary frequency  Musculoskeletal: [ ] negative [ ] back pain [ ] myalgias [ ] arthralgias [ ] fracture  Skin: [ ] negative [ ] rash [ ] itch  Neurological: [ ] negative [ ] headache [ ] dizziness [ ] syncope [ ] weakness [ ] numbness  Psychiatric: [ ] negative [ ] anxiety [ ] depression  Endocrine: [ ] negative [ ] diabetes [ ] thyroid problem  Hematologic/Lymphatic: [ ] negative [ ] anemia [ ] bleeding problem  Allergic/Immunologic: [ ] negative [ ] itchy eyes [ ] nasal discharge [ ] hives [ ] angioedema  [ ] All other systems negative  [ ] Unable to assess ROS because ________    OBJECTIVE:  ICU Vital Signs Last 24 Hrs  T(C): 37.7 (29 Sep 2017 14:06), Max: 38.3 (28 Sep 2017 21:48)  T(F): 99.9 (29 Sep 2017 14:06), Max: 100.9 (28 Sep 2017 21:48)  HR: 104 (29 Sep 2017 14:06) (103 - 128)  BP: 148/91 (29 Sep 2017 14:06) (141/72 - 166/94)  BP(mean): --  ABP: --  ABP(mean): --  RR: 19 (29 Sep 2017 14:06) (17 - 22)  SpO2: 100% (29 Sep 2017 14:06) (85% - 100%)        09-29 @ 07:01  -  09-29 @ 15:39  --------------------------------------------------------  IN: 0 mL / OUT: 1800 mL / NET: -1800 mL      CAPILLARY BLOOD GLUCOSE  102 (29 Sep 2017 12:23)          PHYSICAL EXAM:  General:   HEENT:   Lymph Nodes:  Neck:   Respiratory:   Cardiovascular:   Abdomen:   Extremities:   Skin:   Neurological:  Psychiatry:    HOSPITAL MEDICATIONS:  Standing Meds:  insulin lispro (HumaLOG) corrective regimen sliding scale   SubCutaneous three times a day before meals  insulin lispro (HumaLOG) corrective regimen sliding scale   SubCutaneous at bedtime  dextrose 5%. 1000 milliLiter(s) IV Continuous <Continuous>  dextrose 50% Injectable 12.5 Gram(s) IV Push once  dextrose 50% Injectable 25 Gram(s) IV Push once  dextrose 50% Injectable 25 Gram(s) IV Push once  influenza   Vaccine 0.5 milliLiter(s) IntraMuscular once  vancomycin  IVPB 750 milliGRAM(s) IV Intermittent once  ampicillin/sulbactam  IVPB      heparin  Injectable 5000 Unit(s) SubCutaneous every 8 hours      PRN Meds:  dextrose Gel 1 Dose(s) Oral once PRN  glucagon  Injectable 1 milliGRAM(s) IntraMuscular once PRN  oxyCODONE    5 mG/acetaminophen 325 mG 1 Tablet(s) Oral every 6 hours PRN  melatonin 3 milliGRAM(s) Oral at bedtime PRN      LABS:                        9.2    5.04  )-----------( 187      ( 29 Sep 2017 06:30 )             27.8     Hgb Trend: 9.2<--, 8.1<--, 8.1<--, 8.6<--, 9.7<--  09-29    139  |  105  |  38<H>  ----------------------------<  84  4.7   |  16<L>  |  3.75<H>    Ca    11.7<H>      29 Sep 2017 06:30  Phos  4.4     09-29  Mg     1.8     09-29    TPro  7.0  /  Alb  3.2<L>  /  TBili  0.3  /  DBili  x   /  AST  12  /  ALT  6   /  AlkPhos  54  09-29    Creatinine Trend: 3.75<--, 4.11<--, 4.30<--, 4.61<--, 5.21<--, 5.65<--  PT/INR - ( 28 Sep 2017 09:51 )   PT: 12.0 SEC;   INR: 1.07          PTT - ( 28 Sep 2017 09:51 )  PTT:31.9 SEC          MICROBIOLOGY:     RADIOLOGY:  [ ] Reviewed and interpreted by me    PULMONARY FUNCTION TESTS:    EKG: CHIEF COMPLAINT: Abnormal labs    HPI: 46 yo F with pmh of TIIDM, sarcoidosis with no treatment in last 3 years who presented fro rheumatologist for abnormal labs. The patient states that over the past 3 months she has been having worsening symptoms of fever, night sweats, weight loss, fatigue, dyspnea on exertion and so went to a rheumatologist who salomón labs and found that she was hypercalcemic with acute kidney injury so sent her to the ED.  In the ED she was found to have T 98.5F, HR 98/min, /70, RR 16/min, SpO2 100% on RA. She was found to have Ca 15.3 and creatinine 5.65. Pt was given 2L NS bolus. Serum calcium improved to 12.8 and serum creatinine to 5.2. (26 Sep 2017 08:27)  She was treated for hypercalcemia and MIKALA with IVF which have improve. Heme/Onc were consulted and took a left axillary biopsy.     PAST MEDICAL & SURGICAL HISTORY:  Diabetes  Sarcoidosis  No significant past surgical history      FAMILY HISTORY:  Family history of type C viral hepatitis (Mother): Mother      SOCIAL HISTORY:  Smoking: [x ] Never Smoked [ ] Former Smoker (__ packs x ___ years) [ ] Current Smoker  (__ packs x ___ years)  Substance Use: [x ] Never Used [ ] Used ____  EtOH Use: none  Marital Status: [x ] Single [ ]  [ ]  [ ]   Sexual History:   Occupation: works at [employer]  Recent Travel: None  Birth: [facility]    Allergies    No Known Allergies    Intolerances        HOME MEDICATIONS:    REVIEW OF SYSTEMS:  Constitutional: [ ] negative [x ] fevers [x ] chills [x ] weight loss [ ] weight gain  HEENT: [ ] negative [x ] dry eyes [x ] eye irritation [ ] postnasal drip [ ] nasal congestion  CV: [x ] negative  [ ] chest pain [ ] orthopnea [ ] palpitations [ ] murmur  Resp: [ ] negative [ ] cough [x ] shortness of breath [x ] dyspnea [ ] wheezing [ ] sputum [ ] hemoptysis  GI: [ x] negative [ ] nausea [ ] vomiting [ ] diarrhea [ ] constipation [ ] abd pain [ ] dysphagia   : [x ] negative [ ] dysuria [ ] nocturia [ ] hematuria [ ] increased urinary frequency  Musculoskeletal: [ ] negative [ ] back pain [X ] myalgias [X ] arthralgias [ ] fracture  Skin: [ ] negative [ X] rash [ ] itch  Neurological: [ ] negative [ ] headache [ ] dizziness [ ] syncope [ X] weakness [ ] numbness  Psychiatric: [X ] negative [ ] anxiety [ ] depression  Endocrine: [ ] negative [ X] diabetes [ ] thyroid problem  Hematologic/Lymphatic: [ ] negative [ ] anemia [ ] bleeding problem  Allergic/Immunologic: [X ] negative [ ] itchy eyes [ ] nasal discharge [ ] hives [ ] angioedema  [ ] All other systems negative  [ ] Unable to assess ROS because ________    OBJECTIVE:  ICU Vital Signs Last 24 Hrs  T(C): 37.7 (29 Sep 2017 14:06), Max: 38.3 (28 Sep 2017 21:48)  T(F): 99.9 (29 Sep 2017 14:06), Max: 100.9 (28 Sep 2017 21:48)  HR: 104 (29 Sep 2017 14:06) (103 - 128)  BP: 148/91 (29 Sep 2017 14:06) (141/72 - 166/94)  BP(mean): --  ABP: --  ABP(mean): --  RR: 19 (29 Sep 2017 14:06) (17 - 22)  SpO2: 100% (29 Sep 2017 14:06) (85% - 100%)        09-29 @ 07:01  -  09-29 @ 15:39  --------------------------------------------------------  IN: 0 mL / OUT: 1800 mL / NET: -1800 mL      CAPILLARY BLOOD GLUCOSE  102 (29 Sep 2017 12:23)          PHYSICAL EXAM:  General: Lying in bed no acute distress  HEENT: Injected conjunctiva, PERRL  Lymph Nodes: diffuse lymphadenopathy down cervical, posterior and SCM chain as well as axillary  Neck: No JVD  Respiratory: Mild inspiratory crackles L and R LL, no dullness to percussion  Cardiovascular: I/VI systolic murmur with normal S1, S2, no rubs or gallops  Abdomen: No TTP, normoactive BS, no distention  Extremities: No clubbing, 2+ dorsalis pedis pulses  Skin: right forearm erythema nodosum  Neurological: nonfocal, CN II-XII intact  Psychiatry: normal affect    HOSPITAL MEDICATIONS:  Standing Meds:  insulin lispro (HumaLOG) corrective regimen sliding scale   SubCutaneous three times a day before meals  insulin lispro (HumaLOG) corrective regimen sliding scale   SubCutaneous at bedtime  dextrose 5%. 1000 milliLiter(s) IV Continuous <Continuous>  dextrose 50% Injectable 12.5 Gram(s) IV Push once  dextrose 50% Injectable 25 Gram(s) IV Push once  dextrose 50% Injectable 25 Gram(s) IV Push once  influenza   Vaccine 0.5 milliLiter(s) IntraMuscular once  vancomycin  IVPB 750 milliGRAM(s) IV Intermittent once  ampicillin/sulbactam  IVPB      heparin  Injectable 5000 Unit(s) SubCutaneous every 8 hours      PRN Meds:  dextrose Gel 1 Dose(s) Oral once PRN  glucagon  Injectable 1 milliGRAM(s) IntraMuscular once PRN  oxyCODONE    5 mG/acetaminophen 325 mG 1 Tablet(s) Oral every 6 hours PRN  melatonin 3 milliGRAM(s) Oral at bedtime PRN      LABS:                        9.2    5.04  )-----------( 187      ( 29 Sep 2017 06:30 )             27.8     Hgb Trend: 9.2<--, 8.1<--, 8.1<--, 8.6<--, 9.7<--  09-29    139  |  105  |  38<H>  ----------------------------<  84  4.7   |  16<L>  |  3.75<H>    Ca    11.7<H>      29 Sep 2017 06:30  Phos  4.4     09-29  Mg     1.8     09-29    TPro  7.0  /  Alb  3.2<L>  /  TBili  0.3  /  DBili  x   /  AST  12  /  ALT  6   /  AlkPhos  54  09-29    Creatinine Trend: 3.75<--, 4.11<--, 4.30<--, 4.61<--, 5.21<--, 5.65<--  PT/INR - ( 28 Sep 2017 09:51 )   PT: 12.0 SEC;   INR: 1.07          PTT - ( 28 Sep 2017 09:51 )  PTT:31.9 SEC          MICROBIOLOGY:     RADIOLOGY:  [x ] Reviewed and interpreted by me    PULMONARY FUNCTION TESTS:    EKG:

## 2017-09-29 NOTE — PROGRESS NOTE ADULT - PROBLEM SELECTOR PLAN 2
differential includes Jose 2/2 malignancy(myeloma) vs 2/2 hypercalcemia vs NSAID induced nephropathy. Renal Sono showing medical renal disease. SPEP findings c/w chronic inflamation,UPEP showing gamma band, increased kappa and lambda light chains but ratio normal range. Cr down trending on IVF  -Immunofixation  -Strict I/OS  -Hold nephrotoxins differential includes Jose 2/2 malignancy(myeloma) vs 2/2 hypercalcemia vs NSAID induced nephropathy. Renal Sono showing medical renal disease. SPEP findings c/w chronic inflamation,UPEP showing gamma band, increased kappa and lambda light chains but ratio normal range. Cr down trending on IVF  - dc'd fluids this AM for SOB/crackles  -Immunofixation  -Strict I/OS  -Hold nephrotoxins Pt presenting with severe hypercalcemia in the setting of known sarcoidosis. Pt with Vit D 1,25 505 with otherwise normal hypercalemia w/u.  - Ca uptrended today to 11.7 on fluids  - on 100 cc/hr NS ON; dc this AM as patient is increasingly dyspneic and has crackles  -Strict I/Os  -Pt will benefit from steroids given elevated Vit D but will have to first r/o Lymphoma as a cause of elevated Vit D 1,25  - Flow cytometry sent this AM

## 2017-09-29 NOTE — PROGRESS NOTE ADULT - PROBLEM SELECTOR PLAN 5
A1C 1 month ago was 7.8; FS yesterday , no SSI administered   -FS TID   -Diabetic diet  -Hold metformin   -Sliding scale Unclear etiology but pt now has two cell lines affected concerning for primary marrow issue  - Hematology c/s: recommending hepatitis panel, uric acid, PTH-rp and CT A/P (HIV -, )

## 2017-09-29 NOTE — PROGRESS NOTE ADULT - PROBLEM SELECTOR PLAN 1
Pt. with most likely hemodynamically mediated MIKALA in setting of hypercalcemia and recent NSAID use. Scr decreased to 3.7  today from 4.1 yesterday. Patient reports voiding well. Continue IVF with close observation. May need to decrease IVF if patient shows signs of fluid overload.  Monitor labs and urine output. Avoid nephrotoxins, NSAIDs, and RCA Pt. with most likely hemodynamically mediated MIKALA in setting of hypercalcemia and recent NSAID use. Scr decreased to 3.7  today from 4.1 yesterday. Patient reports voiding well. Hold IVF. Advise one dose of lasix 40mg today.  Monitor labs and urine output. Avoid nephrotoxins, NSAIDs, and RCA Pt. with most likely hemodynamically mediated MIKALA in setting of hypercalcemia and recent NSAID use. Scr decreased to 3.75 today from 4.1 yesterday. Patient reports voiding well. IVF discontinued as pt. developed SOB. Pt. with ? fluid overload. Recommend a dose of IV lasix 40 mg now. Discussed with primary medical team.  Monitor labs and urine output. Avoid nephrotoxins, NSAIDs, and RCA

## 2017-09-29 NOTE — PROGRESS NOTE ADULT - PROBLEM SELECTOR PLAN 3
Pt. with hypercalcemia in setting of sarcoidosis/lymphadenopathy. Serum calcium 11.7  today. Continue IVF as tolerated. Monitor serum calcium Pt. with hypercalcemia in setting of sarcoidosis/lymphadenopathy. Serum calcium 11.7  today. Advise lasix 40mg today.  Monitor serum calcium Pt. with hypercalcemia in setting of sarcoidosis/lymphadenopathy. Serum calcium 11.7  today. Advise Lasix 40mg today. Monitor serum calcium

## 2017-09-29 NOTE — PROGRESS NOTE ADULT - ASSESSMENT
47yoF hx of sarcoidosis (dx 2010 via biopsy showing noncaseating granulomas not on any treatment), T2DM, sent in from outside rheumatologist for abnormal labs showing MIKALA and hypercalcemia, in setting of subacute-to-acute sx/sx including weight loss, night sweats, and dyspnea on exertion. Pt on admission (9/25) had Ca+ 15.3, creatinine 5.65, diffuse lymphadenopathy on physical exam and CT chest. As of 9/29, Ca improved to <12 and Cr to <4. S/p biopsy of axillary lymph node 9/28. Course complicated by event on 9/28 including hypoxia (SpO2 85%), tachycardia (HRmax 128), and hemoptysis, thought likely to be acute pulmonary edema in setting of heart failure -- supported by high volume of IV fluid input during admission, BNP >13,000. Another possible option is pulmonary embolism (Modified Wells 2.5 based on tachycardia + hemoptysis i.e. intermediate probability); patient's known pulmonary sarcoid seems unlikely to have progressed so acutely.     PLAN  1) Hypercalcemia- differential includes sarcoidosis vs. malignancy such as MM/lymphoma. Pt has diagnosed sarcoid not on any treatment  HyperCa+ can be due to excess Vit D production by granulomas. However, she should also undergo w/u to rule out multiple myeloma  - f/u immunofixation from inpatient studies (as outpatient 9/22, showed no monoclonal proteins in either Urine immunofixation or Serum immunofixation)  - f/u IR-guided core biopsy  - In anticipation of possible steroid use, consider checking Acute Hepatitis Panel (negative as outpatient 9/22/17). TB Quant Gold negative.   - Rheum to discuss with Pulm RE: advisability of initiating steroids with one-time dose of prednisone 50mg PO; final recommendations per attending's addendum.      2) MIKALA - MIKALA may be 2/2 hypercalcemia and/or AIN 2/2 NSAID use. Pt also notes recent Abx use for UTI treatment  - UA appears non-infectious; Urine culture negative  - s/p furosemide 40mg IV x1 on 9/29; holding IV fluids due to concern that dyspnea and cough 2/2 fluid overload  - US renal w/o hydronephrosis, with parenchyma echogenic    3) Anemia- pt has microcytic anemia w Retic Index 0.5%, likely failure of production (low EPO vs lymphoma vs parvovirus infection vs other)  - Fe wnl, TIBC wnl, UIBC wnl, Ferritin wnl, LDH low, haptoglobin wnl    4) Acute exacerbation of progressive ENGLISH - concern for pulm vs CARDIAC sarcoid vs possible pericardial effusion (though none seen on CT chest) 2/2 renal failure/uremia  - Perform TTE     5) Fever, chills, night sweats, weight loss; diffuse lymphadenopathy; hx of R groin lymph-node bx (12/2014) revealing granulomatous lymphadenitis, AFB neg  - Recommend repeat excisional biopsy of peripheral lymph node(s), with analyses to include AFB staining    will follow 47yoF hx of sarcoidosis (dx 2010 via biopsy showing noncaseating granulomas not on any treatment), T2DM, sent in from outside rheumatologist for abnormal labs showing MIKALA and hypercalcemia, in setting of subacute-to-acute sx/sx including weight loss, night sweats, and dyspnea on exertion. Pt on admission (9/25) had Ca+ 15.3, creatinine 5.65, diffuse lymphadenopathy on physical exam and CT chest. As of 9/29, Ca improved to <12 and Cr to <4. S/p biopsy of axillary lymph node 9/28. Course complicated by event on 9/28 including hypoxia (SpO2 85%), tachycardia (HRmax 128), and hemoptysis, thought likely to be acute pulmonary edema in setting of acute left-sided heart failure -- supported by high volume of IV fluid input during admission, BNP >13,000. Another possible option is pulmonary embolism (Modified Wells 2.5 based on tachycardia + hemoptysis i.e. intermediate probability, V/Q scan deferred on 9/28 due to patient's concern about lying flat for extended period, CTA inadvisable in setting of MIKALA). Patient's known pulmonary sarcoid seems unlikely to have progressed so acutely.     PLAN  1) Hypercalcemia- differential includes sarcoidosis vs. malignancy such as MM/lymphoma. Pt has diagnosed sarcoid not on any treatment  HyperCa+ can be due to excess Vit D production by granulomas. However, she should also undergo w/u to rule out multiple myeloma  - f/u immunofixation from inpatient studies (as outpatient 9/22, showed no monoclonal proteins in either Urine immunofixation or Serum immunofixation)  - f/u IR-guided core biopsy  - In anticipation of possible steroid use, consider checking Acute Hepatitis Panel (negative as outpatient 9/22/17). TB Quant Gold negative.   - Rheum to discuss with Pulm RE: advisability of initiating steroids with one-time dose of prednisone 50mg PO; final recommendations per attending's addendum.      2) MIKALA - MIKALA may be 2/2 hypercalcemia and/or AIN 2/2 NSAID use. Pt also notes recent Abx use for UTI treatment  - UA appears non-infectious; Urine culture negative  - s/p furosemide 40mg IV x1 on 9/29; holding IV fluids due to concern that dyspnea and cough 2/2 fluid overload  - US renal w/o hydronephrosis, with parenchyma echogenic    3) Anemia- pt has microcytic anemia w Retic Index 0.5%, likely failure of production (low EPO vs lymphoma vs parvovirus infection vs other)  - Fe wnl, TIBC wnl, UIBC wnl, Ferritin wnl, LDH low, haptoglobin wnl    4) Acute exacerbation of progressive ENGLISH - concern for pulm vs CARDIAC sarcoid vs possible pericardial effusion (though none seen on CT chest) 2/2 renal failure/uremia  - Perform TTE  - Consider V/Q scan      5) Fever, chills, night sweats, weight loss; diffuse lymphadenopathy; hx of R groin lymph-node bx (12/2014) revealing granulomatous lymphadenitis, AFB neg  - Recommend repeat excisional biopsy of peripheral lymph node(s), with analyses to include AFB staining    will follow 47yoF hx of sarcoidosis (dx 2010 via biopsy showing noncaseating granulomas not on any treatment), T2DM, sent in from outside rheumatologist for abnormal labs showing MIKALA and hypercalcemia, in setting of subacute-to-acute sx/sx including weight loss, night sweats, and dyspnea on exertion. Pt on admission (9/25) had Ca+ 15.3, creatinine 5.65, diffuse lymphadenopathy on physical exam and CT chest. As of 9/29, Ca improved to <12 and Cr to <4. S/p biopsy of axillary lymph node 9/28. Course complicated by event on 9/28 including hypoxia (SpO2 85%), tachycardia (HRmax 128), and hemoptysis, thought likely to be acute pulmonary edema in setting of acute left-sided heart failure -- supported by high volume of IV fluid input during admission, BNP >13,000. Another possible option is pulmonary embolism (Modified Wells 2.5 based on tachycardia + hemoptysis i.e. intermediate probability, V/Q scan deferred on 9/28 due to patient's concern about lying flat for extended period, CTA inadvisable in setting of MIKALA). Patient's known pulmonary sarcoid seems unlikely to have progressed so acutely.     PLAN  1) Hypercalcemia- differential includes sarcoidosis vs. malignancy such as MM/lymphoma. Pt has diagnosed sarcoid not on any treatment  HyperCa+ can be due to excess Vit D production by granulomas. However, she should also undergo w/u to rule out multiple myeloma  - f/u immunofixation from inpatient studies (as outpatient 9/22, showed no monoclonal proteins in either Urine immunofixation or Serum immunofixation)  - f/u IR-guided core biopsy  - In anticipation of possible steroid use, consider checking Acute Hepatitis Panel (negative as outpatient 9/22/17). TB Quant Gold negative.   - Rheum to discuss with Pulm RE: advisability of initiating steroids with one-time dose of prednisone 50mg PO; final recommendations per attending's addendum. (Recommend prednisone rather than methylprednisolone, because patient has history of tolerating prednisone and a questionable history of skin reaction in response to methylprednisolone)    2) MIKALA - MIKALA may be 2/2 hypercalcemia and/or AIN 2/2 NSAID use. Pt also notes recent Abx use for UTI treatment  - UA appears non-infectious; Urine culture negative  - s/p furosemide 40mg IV x1 on 9/29; holding IV fluids due to concern that dyspnea and cough 2/2 fluid overload  - US renal w/o hydronephrosis, with parenchyma echogenic    3) Anemia- pt has microcytic anemia w Retic Index 0.5%, likely failure of production (low EPO vs lymphoma vs parvovirus infection vs other)  - Fe wnl, TIBC wnl, UIBC wnl, Ferritin wnl, LDH low, haptoglobin wnl    4) Acute exacerbation of progressive ENGLISH - concern for pulm vs CARDIAC sarcoid vs possible pericardial effusion (though none seen on CT chest) 2/2 renal failure/uremia  - Perform TTE  - Consider V/Q scan      5) Fever, chills, night sweats, weight loss; diffuse lymphadenopathy; hx of R groin lymph-node bx (12/2014) revealing granulomatous lymphadenitis, AFB neg  - Recommend repeat excisional biopsy of peripheral lymph node(s), with analyses to include AFB staining    will follow 47yoF hx of sarcoidosis (dx 2010 via biopsy showing noncaseating granulomas not on any treatment), T2DM, sent in from outside rheumatologist for abnormal labs showing MIKALA and hypercalcemia, in setting of subacute-to-acute sx/sx including weight loss, night sweats, and dyspnea on exertion. Pt on admission (9/25) had Ca+ 15.3, creatinine 5.65, diffuse lymphadenopathy on physical exam and CT chest. As of 9/29, Ca improved to <12 and Cr to <4. S/p biopsy of axillary lymph node 9/28. Course complicated by event on 9/28 including hypoxia (SpO2 85%), tachycardia (HRmax 128), and hemoptysis, thought likely to be acute pulmonary edema in setting of acute left-sided heart failure -- supported by high volume of IV fluid input during admission, BNP >13,000. Another possible option is pulmonary embolism (Modified Wells 2.5 based on tachycardia + hemoptysis i.e. intermediate probability, V/Q scan deferred on 9/28 due to patient's concern about lying flat for extended period, CTA inadvisable in setting of MIKALA). Patient's known pulmonary sarcoid seems unlikely to have progressed so acutely. As of 9/29, on Unasyn and Vancomycin.      PLAN  1) Hypercalcemia- differential includes sarcoidosis vs. malignancy such as MM/lymphoma. Pt has diagnosed sarcoid not on any treatment  HyperCa+ can be due to excess Vit D production by granulomas. However, she should also undergo w/u to rule out multiple myeloma  - f/u immunofixation from inpatient studies (as outpatient 9/22, showed no monoclonal proteins in either Urine immunofixation or Serum immunofixation)  - f/u IR-guided core biopsy  - In anticipation of possible steroid use, consider checking Acute Hepatitis Panel (negative as outpatient 9/22/17). TB Quant Gold negative.   - Rheum to discuss with Pulm RE: advisability of initiating steroids with one-time dose of prednisone 50mg PO; final recommendations per attending's addendum. (Recommend prednisone rather than methylprednisolone, because patient has history of tolerating prednisone and a questionable history of skin reaction in response to methylprednisolone)    2) MIKALA - MIKALA may be 2/2 hypercalcemia and/or AIN 2/2 NSAID use. Pt also notes recent Abx use for UTI treatment  - UA appears non-infectious; Urine culture negative  - s/p furosemide 40mg IV x1 on 9/29; holding IV fluids due to concern that dyspnea and cough 2/2 fluid overload  - US renal w/o hydronephrosis, with parenchyma echogenic    3) Anemia- pt has microcytic anemia w Retic Index 0.5%, likely failure of production (low EPO vs lymphoma vs parvovirus infection vs other)  - Fe wnl, TIBC wnl, UIBC wnl, Ferritin wnl, LDH low, haptoglobin wnl    4) Acute exacerbation of progressive ENGLISH - concern for pulm vs CARDIAC sarcoid vs possible pericardial effusion (though none seen on CT chest) 2/2 renal failure/uremia  - Perform TTE  - Consider V/Q scan      5) Fever, chills, night sweats, weight loss; diffuse lymphadenopathy; hx of R groin lymph-node bx (12/2014) revealing granulomatous lymphadenitis, AFB neg  - Recommend repeat excisional biopsy of peripheral lymph node(s), with analyses to include AFB staining    will follow 47yoF hx of sarcoidosis (dx 2010 via biopsy showing noncaseating granulomas not on any treatment), T2DM, sent in from outside rheumatologist for abnormal labs showing MIKALA and hypercalcemia, in setting of subacute-to-acute sx/sx including weight loss, night sweats, and dyspnea on exertion. Pt on admission (9/25) had Ca+ 15.3, creatinine 5.65, diffuse lymphadenopathy on physical exam and CT chest. As of 9/29, Ca improved to <12 and Cr to <4. S/p biopsy of axillary lymph node 9/28. Course complicated by event on 9/28 including hypoxia (SpO2 85%), tachycardia (HRmax 128), and hemoptysis, thought likely to be acute pulmonary edema in setting of acute left-sided heart failure -- supported by high volume of IV fluid input during admission, BNP >13,000. Another possible option is pulmonary embolism (Modified Wells 2.5 based on tachycardia + hemoptysis i.e. intermediate probability, V/Q scan deferred on 9/28 due to patient's concern about lying flat for extended period, CTA inadvisable in setting of MIKALA). Diff Dx for fever & SOB also includes pneumonia (anion gap increased to 18 on 9/29 from 13 since 9/28); as of 9/29, on Unasyn and Vancomycin.  Patient's known pulmonary sarcoid seems unlikely to have progressed so acutely.     PLAN  1) Hypercalcemia- differential includes sarcoidosis vs. malignancy such as MM/lymphoma. Pt has diagnosed sarcoid not on any treatment  HyperCa+ can be due to excess Vit D production by granulomas. However, she should also undergo w/u to rule out multiple myeloma  - f/u immunofixation inpatient studies (as outpatient 9/22, showed no monoclonal proteins in either Urine immunofixation or Serum immunofixation)  - f/u IR-guided core biopsy  - In anticipation of possible steroid use, consider checking Acute Hepatitis Panel (negative as outpatient 9/22/17). TB Quant Gold negative.   - Rheum to discuss with Pulm RE: advisability of initiating steroids with one-time dose of prednisone 50mg PO; final recommendations per attending's addendum. (Recommend prednisone rather than methylprednisolone, because patient has history of tolerating prednisone and a questionable history of skin reaction in response to methylprednisolone)    2) MIKALA - MIKALA may be 2/2 hypercalcemia and/or AIN 2/2 NSAID use. Pt also notes recent Abx use for UTI treatment  - UA appears non-infectious; Urine culture negative  - s/p furosemide 40mg IV x1 on 9/29; holding IV fluids due to concern that dyspnea and cough 2/2 fluid overload  - US renal w/o hydronephrosis, with parenchyma echogenic    3) Anemia- pt has microcytic anemia w Retic Index 0.5%, likely failure of production (low EPO vs lymphoma vs parvovirus infection vs other)  - Fe wnl, TIBC wnl, UIBC wnl, Ferritin wnl, LDH low, haptoglobin wnl    4) Acute exacerbation of progressive ENGLISH - concern for pulm vs CARDIAC sarcoid vs possible pericardial effusion (though none seen on CT chest) 2/2 renal failure/uremia  - Perform TTE  - Consider V/Q scan if patient able to tolerate    5) Fever, chills, night sweats, weight loss; diffuse lymphadenopathy; hx of R groin lymph-node bx (12/2014) revealing granulomatous lymphadenitis, AFB neg  - Recommend repeat excisional biopsy of peripheral lymph node(s), with analyses to include AFB staining    will follow

## 2017-09-29 NOTE — PROGRESS NOTE ADULT - SUBJECTIVE AND OBJECTIVE BOX
MAYRATRACIELA SIRIA  [id number]    INTERVAL HPI/OVERNIGHT EVENTS:  9/28 evening, after biopsy of L axillary lymph node, patient became hypoxic (SpO2 85%) and tachycardic (HR 120s), w increased cougihing and w some hemoptysis. Placed on nasal cannula; overnight briefly used non-rebreather, otherwise nasal cannula.   9/28 night, febrile (Tmax 100.9).   Received furosemide 40mg IV x1  Ordered to start vancomycin and ampicillin/sulbactam  Started heparin subcutaneously at VTE prophylaxis dose    MEDICATIONS  (STANDING):  insulin lispro (HumaLOG) corrective regimen sliding scale   SubCutaneous three times a day before meals  insulin lispro (HumaLOG) corrective regimen sliding scale   SubCutaneous at bedtime  dextrose 5%. 1000 milliLiter(s) (50 mL/Hr) IV Continuous <Continuous>  dextrose 50% Injectable 12.5 Gram(s) IV Push once  dextrose 50% Injectable 25 Gram(s) IV Push once  dextrose 50% Injectable 25 Gram(s) IV Push once  influenza   Vaccine 0.5 milliLiter(s) IntraMuscular once  vancomycin  IVPB 750 milliGRAM(s) IV Intermittent once  ampicillin/sulbactam  IVPB      heparin  Injectable 5000 Unit(s) SubCutaneous every 8 hours    MEDICATIONS  (PRN):  dextrose Gel 1 Dose(s) Oral once PRN Blood Glucose LESS THAN 70 milliGRAM(s)/deciliter  glucagon  Injectable 1 milliGRAM(s) IntraMuscular once PRN Glucose LESS THAN 70 milligrams/deciliter  oxyCODONE    5 mG/acetaminophen 325 mG 1 Tablet(s) Oral every 6 hours PRN Severe Pain (7 - 10)  melatonin 3 milliGRAM(s) Oral at bedtime PRN Insomnia    Allergies: No Known Allergies    Review of Systems:   General:   HEENT:   CVS:   Resp:   GI:   MSK:   Skin:   Neuro:       Vital Signs Last 24 Hrs  T(C): 37.7 (29 Sep 2017 14:06), Max: 38.3 (28 Sep 2017 21:48)  T(F): 99.9 (29 Sep 2017 14:06), Max: 100.9 (28 Sep 2017 21:48)  HR: 104 (29 Sep 2017 14:06) (103 - 128)  BP: 148/91 (29 Sep 2017 14:06) (141/72 - 166/94)  RR: 19 (29 Sep 2017 14:06) (17 - 22)  SpO2: 100% (29 Sep 2017 14:06) (85% - 100%)    Physical Exam:  General:  HEENT:   Cardio:   Resp:   GI:   MSK:  Neuro:   Psych:     LABS:                        9.2    5.04  )-----------( 187      ( 29 Sep 2017 06:30 )             27.8     09-29    139  |  105      |  38<H>  ----------------------------<  84  4.7   |  16<L>  |  3.75<H>    Ca    11.7<H>      29 Sep 2017 06:30  Phos  4.4     09-29  Mg     1.8     09-29    TPro  7.0  /  Alb  3.2<L>  /  TBili  0.3  /  DBili  x   /  AST  12  /  ALT  6   /  AlkPhos  54  09-29    PT/INR - ( 28 Sep 2017 09:51 )   PT: 12.0 SEC;   INR: 1.07     PTT - ( 28 Sep 2017 09:51 )  PTT:31.9 SEC    Uric Acid, Serum: 7.7 mg/dL (09.29.17 @ 06:30)    Culture - Urine: NO GROWTH AT 24 HOURS (09.26.17 @ 19:11)    RADIOLOGY & ADDITIONAL TESTS:  "FINDINGS:  Bibasilar reticular opacities corresponding to known pulmonary fibrosis.   No new focal consolidations.  There are no pleural effusions or pneumothorax.  The cardiomediastinal silhouette is within normal limits.  The visualized osseous and soft tissue structures demonstrate no acute   pathology.    IMPRESSION:   Bibasilar reticular opacities correspond to known pulmonary fibrosis. No   new focal lung consolidations." LEONEL SIRIA  [id number]    INTERVAL HPI/OVERNIGHT EVENTS:  9/28 evening, after biopsy of L axillary lymph node, patient became hypoxic (SpO2 85%) and tachycardic (HR 120s), w increased cougihing and w some hemoptysis. Placed on nasal cannula; overnight briefly used non-rebreather, otherwise nasal cannula.   9/28 night, febrile (Tmax 100.9).   Received furosemide 40mg IV x1  Ordered to start vancomycin and ampicillin/sulbactam  Started heparin subcutaneously at VTE prophylaxis dose    MEDICATIONS  (STANDING):  insulin lispro (HumaLOG) corrective regimen sliding scale   SubCutaneous three times a day before meals  insulin lispro (HumaLOG) corrective regimen sliding scale   SubCutaneous at bedtime  dextrose 5%. 1000 milliLiter(s) (50 mL/Hr) IV Continuous <Continuous>  dextrose 50% Injectable 12.5 Gram(s) IV Push once  dextrose 50% Injectable 25 Gram(s) IV Push once  dextrose 50% Injectable 25 Gram(s) IV Push once  influenza   Vaccine 0.5 milliLiter(s) IntraMuscular once  vancomycin  IVPB 750 milliGRAM(s) IV Intermittent once  ampicillin/sulbactam  IVPB      heparin  Injectable 5000 Unit(s) SubCutaneous every 8 hours    MEDICATIONS  (PRN):  dextrose Gel 1 Dose(s) Oral once PRN Blood Glucose LESS THAN 70 milliGRAM(s)/deciliter  glucagon  Injectable 1 milliGRAM(s) IntraMuscular once PRN Glucose LESS THAN 70 milligrams/deciliter  oxyCODONE    5 mG/acetaminophen 325 mG 1 Tablet(s) Oral every 6 hours PRN Severe Pain (7 - 10)  melatonin 3 milliGRAM(s) Oral at bedtime PRN Insomnia    Allergies: No Known Allergies -- past history of skin lesions in association with ?Medrol dose pack, but also took prednisone without incident as recently as ~3 years ago    Review of Systems:   General:   HEENT:   CVS:   Resp: Most recent hemoptysis 9/29 AM  GI:   MSK:   Skin:   Neuro:       Vital Signs Last 24 Hrs  T(C): 37.7 (29 Sep 2017 14:06), Max: 38.3 (28 Sep 2017 21:48)  T(F): 99.9 (29 Sep 2017 14:06), Max: 100.9 (28 Sep 2017 21:48)  HR: 104 (29 Sep 2017 14:06) (103 - 128)  BP: 148/91 (29 Sep 2017 14:06) (141/72 - 166/94)  RR: 19 (29 Sep 2017 14:06) (17 - 22)  SpO2: 100% (29 Sep 2017 14:06) (85% - 100%)    Physical Exam:  General: Anxious-appearing  Cardio: Regular rate and rhythm  Resp: Posterior lung fields with crackles up to mid-lung-field  Abd: Soft, non-tender  Ext: Lower extremities without pitting edema  Neuro: Moving all four extremities  Psych: Coherent, goal-oriented    LABS:                        9.2    5.04  )-----------( 187      ( 29 Sep 2017 06:30 )             27.8     09-29    139  |  105      |  38<H>  ----------------------------<  84  4.7   |  16<L>  |  3.75<H>    Ca    11.7<H>      29 Sep 2017 06:30  Phos  4.4     09-29  Mg     1.8     09-29    TPro  7.0  /  Alb  3.2<L>  /  TBili  0.3  /  DBili  x   /  AST  12  /  ALT  6   /  AlkPhos  54  09-29    PT/INR - ( 28 Sep 2017 09:51 )   PT: 12.0 SEC;   INR: 1.07     PTT - ( 28 Sep 2017 09:51 )  PTT:31.9 SEC    Uric Acid, Serum: 7.7 mg/dL (09.29.17 @ 06:30)    Serum Pro-Brain Natriuretic Peptide: 31027  (09.29.17 @ 06:30)    CONSIDER ACUTE CONGESTIVE HEART FAILURE if:    AGE                NT-proBNP RESULT  ---                -------------  < 50 YEARS      >  450 pg/mL    Quantiferon - Gold Tuberculosis (09.28.17 @ 06:30)    Quantiferon - Gold Tuberculosis Result: Negative    Culture - Blood (09.28.17 @ 14:44)    Culture - Blood: NO ORGANISMS ISOLATED AT 24 HOURS    Specimen Source: BLOOD VENOUS  Culture - Blood (09.28.17 @ 14:44)    Culture - Blood: NO ORGANISMS ISOLATED AT 24 HOURS    Specimen Source: BLOOD  Culture - Urine: NO GROWTH AT 24 HOURS (09.26.17 @ 19:11)    RADIOLOGY & ADDITIONAL TESTS:  CT Abd/Pelvis W/ PO Contrast 9/29 15:03  "IMPRESSION:   Patchy perihilar opacities and small bilateral pleural effusions which   are new since 9/26/2017 and are consistent with pulmonary edema.  Diffuse bulky lymphadenopathy which is often seen in the setting of   leukemia or lymphoma."    Xray Chest 9/28 19:57  "IMPRESSION:   Bibasilar reticular opacities correspond to known pulmonary fibrosis. No   new focal lung consolidations." [Per primary team and per this author, R costophrenic angle blunting]

## 2017-09-29 NOTE — PROGRESS NOTE ADULT - PROBLEM SELECTOR PLAN 3
Normcytic/microcytic anemia likely 2/2 insufficient marrow production. Reticulocyte count showing no marrow response.   -Monitor CBC daily  -Will consider alternative diagnoses for pts presentation such as lymphoma,MM differential includes Jose 2/2 malignancy(myeloma) vs 2/2 hypercalcemia vs NSAID induced nephropathy. Renal Sono showing medical renal disease. SPEP findings c/w chronic inflamation,UPEP showing gamma band, increased kappa and lambda light chains but ratio normal range. Cr down trending on IVF  - dc'd fluids this AM for SOB/crackles  -Immunofixation  -Strict I/OS  -Hold nephrotoxins

## 2017-09-29 NOTE — PROGRESS NOTE ADULT - SUBJECTIVE AND OBJECTIVE BOX
[facility] DIVISION OF KIDNEY DISEASES AND HYPERTENSION -- [phone]   FOLLOW UP NOTE  --------------------------------------------------------------------------------  HPI:  47-year-old female with known history of sarcoidosis, DM presented to [facility] with complaints of weakness, back pain and LE pain. Pt. found to have MIKALA and hypercalcemia. Pt currently on IVF with improvement in serum creatinine and calcium levels. Pt. seen and examined at bedside. Pt. states she was SOB overnight, improved this AM. Pt also reports headache.  Pt. denies chest pain, LE edema.  Pt. s/p axillary  LN biopsy on 9/28.    PAST HISTORY  --------------------------------------------------------------------------------  No significant changes to PMH, PSH, FHx, SHx, unless otherwise noted    ALLERGIES & MEDICATIONS  --------------------------------------------------------------------------------  Allergies    No Known Allergies    Intolerances      Standing Inpatient Medications  insulin lispro (HumaLOG) corrective regimen sliding scale   SubCutaneous three times a day before meals  insulin lispro (HumaLOG) corrective regimen sliding scale   SubCutaneous at bedtime  dextrose 5%. 1000 milliLiter(s) IV Continuous <Continuous>  dextrose 50% Injectable 12.5 Gram(s) IV Push once  dextrose 50% Injectable 25 Gram(s) IV Push once  dextrose 50% Injectable 25 Gram(s) IV Push once  influenza   Vaccine 0.5 milliLiter(s) IntraMuscular once    PRN Inpatient Medications  dextrose Gel 1 Dose(s) Oral once PRN  glucagon  Injectable 1 milliGRAM(s) IntraMuscular once PRN  oxyCODONE    5 mG/acetaminophen 325 mG 1 Tablet(s) Oral every 6 hours PRN  melatonin 3 milliGRAM(s) Oral at bedtime PRN      REVIEW OF SYSTEMS  --------------------------------------------------------------------------------  General: no fever  CVS: no chest pain  RESP: + sob, + cough  ABD: no abdominal pain  : no dysuria,  MSK: no edema     VITALS/PHYSICAL EXAM  --------------------------------------------------------------------------------  T(C): 37.9 (09-29-17 @ 08:52), Max: 38.3 (09-28-17 @ 21:48)  HR: 125 (09-29-17 @ 08:52) (99 - 128)  BP: 165/98 (09-29-17 @ 08:52) (141/72 - 166/94)  RR: 17 (09-29-17 @ 08:52) (17 - 22)  SpO2: 95% (09-29-17 @ 08:52) (85% - 100%)  Wt(kg): --  Height (cm): 152.4 (09-28-17 @ 16:07)  Weight (kg): 54 (09-28-17 @ 16:07)  BMI (kg/m2): 23.3 (09-28-17 @ 16:07)  BSA (m2): 1.5 (09-28-17 @ 16:07)      Physical Exam:  	Gen: NAD  	HEENT: MMM  	Pulm: minimal bibasilar rales   	CV: S1S2  	Abd: Soft, +BS   	Ext: No LE edema B/L  	Neuro: Awake, alert  	Skin: Warm and dry    LABS/STUDIES  --------------------------------------------------------------------------------              9.2    5.04  >-----------<  187      [09-29-17 @ 06:30]              27.8     139  |  105  |  38  ----------------------------<  84      [09-29-17 @ 06:30]  4.7   |  16  |  3.75        Ca     11.7     [09-29-17 @ 06:30]      Mg     1.8     [09-29-17 @ 06:30]      Phos  4.4     [09-29-17 @ 06:30]    TPro  7.0  /  Alb  3.2  /  TBili  0.3  /  DBili  x   /  AST  12  /  ALT  6   /  AlkPhos  54  [09-29-17 @ 06:30]    PT/INR: PT 12.0 , INR 1.07       [09-28-17 @ 09:51]  PTT: 31.9       [09-28-17 @ 09:51]      Creatinine Trend:  SCr 3.75 [09-29 @ 06:30]  SCr 4.11 [09-28 @ 06:30]  SCr 4.30 [09-27 @ 06:30]  SCr 4.61 [09-27 @ 00:40]  SCr 5.21 [09-26 @ 07:35]    Urinalysis - [09-26-17 @ 15:02]      Color PLYEL / Appearance CLEAR / SG 1.010 / pH 6.5      Gluc 50 / Ketone NEGATIVE  / Bili NEGATIVE / Urobili NORMAL       Blood NEGATIVE / Protein 30 / Leuk Est NEGATIVE / Nitrite NEGATIVE      RBC 0-2 / WBC 0-2 / Hyaline  / Gran  / Sq Epi OCC / Non Sq Epi  / Bacteria     Urine Creatinine 35.32      [09-26-17 @ 08:34]  Urine Protein 22.7      [09-26-17 @ 09:21]  Urine Sodium 102      [09-26-17 @ 08:34]    Iron 36, TIBC 202, %sat --      [09-27-17 @ 06:30]  Ferritin 112.8      [09-27-17 @ 06:30]  PTH 8.12 (Ca --)      [09-26-17 @ 07:35]   --  Vitamin D (25OH) 12.3      [09-26-17 @ 07:35]  HbA1c 6.9      [09-27-17 @ 06:30]      ANCA: cANCA Negative, pANCA Negative, atypical ANCA --      [09-26-17 @ 09:09]  PR3-ANCA <5.0, interpretation: --      [09-26-17 @ 09:09]  Free Light Chains: kappa 17.40, lambda 8.98, ratio = --      [09-26 @ 09:09] [facility] DIVISION OF KIDNEY DISEASES AND HYPERTENSION -- [phone]   FOLLOW UP NOTE  --------------------------------------------------------------------------------  HPI:  47-year-old female with known history of sarcoidosis, DM presented to [facility] with complaints of weakness, back pain and LE pain. Pt. found to have MIKALA and hypercalcemia. Pt currently on IVF with improvement in serum creatinine and calcium levels. Pt. seen and examined at bedside. Pt. states she was SOB overnight, improved this AM. Pt also reports headache.  Pt. denies chest pain, LE edema.  Pt. s/p axillary  LN biopsy on 9/28.    PAST HISTORY  --------------------------------------------------------------------------------  No significant changes to PMH, PSH, FHx, SHx, unless otherwise noted    ALLERGIES & MEDICATIONS  --------------------------------------------------------------------------------  Allergies    No Known Allergies    Intolerances      Standing Inpatient Medications  insulin lispro (HumaLOG) corrective regimen sliding scale   SubCutaneous three times a day before meals  insulin lispro (HumaLOG) corrective regimen sliding scale   SubCutaneous at bedtime  dextrose 5%. 1000 milliLiter(s) IV Continuous <Continuous>  dextrose 50% Injectable 12.5 Gram(s) IV Push once  dextrose 50% Injectable 25 Gram(s) IV Push once  dextrose 50% Injectable 25 Gram(s) IV Push once  influenza   Vaccine 0.5 milliLiter(s) IntraMuscular once    PRN Inpatient Medications  dextrose Gel 1 Dose(s) Oral once PRN  glucagon  Injectable 1 milliGRAM(s) IntraMuscular once PRN  oxyCODONE    5 mG/acetaminophen 325 mG 1 Tablet(s) Oral every 6 hours PRN  melatonin 3 milliGRAM(s) Oral at bedtime PRN      REVIEW OF SYSTEMS  --------------------------------------------------------------------------------  General: + fever  CVS: no chest pain  RESP: + sob, + cough  ABD: no abdominal pain  : no dysuria,  MSK: no edema     VITALS/PHYSICAL EXAM  --------------------------------------------------------------------------------  T(C): 37.9 (09-29-17 @ 08:52), Max: 38.3 (09-28-17 @ 21:48)  HR: 125 (09-29-17 @ 08:52) (99 - 128)  BP: 165/98 (09-29-17 @ 08:52) (141/72 - 166/94)  RR: 17 (09-29-17 @ 08:52) (17 - 22)  SpO2: 95% (09-29-17 @ 08:52) (85% - 100%)  Wt(kg): --  Height (cm): 152.4 (09-28-17 @ 16:07)  Weight (kg): 54 (09-28-17 @ 16:07)  BMI (kg/m2): 23.3 (09-28-17 @ 16:07)  BSA (m2): 1.5 (09-28-17 @ 16:07)      Physical Exam:  	Gen: NAD  	HEENT: MMM  	Pulm: minimal bibasilar rales   	CV: S1S2  	Abd: Soft, +BS   	Ext: No LE edema B/L  	Neuro: Awake, alert  	Skin: Warm and dry    LABS/STUDIES  --------------------------------------------------------------------------------              9.2    5.04  >-----------<  187      [09-29-17 @ 06:30]              27.8     139  |  105  |  38  ----------------------------<  84      [09-29-17 @ 06:30]  4.7   |  16  |  3.75        Ca     11.7     [09-29-17 @ 06:30]      Mg     1.8     [09-29-17 @ 06:30]      Phos  4.4     [09-29-17 @ 06:30]    TPro  7.0  /  Alb  3.2  /  TBili  0.3  /  DBili  x   /  AST  12  /  ALT  6   /  AlkPhos  54  [09-29-17 @ 06:30]    PT/INR: PT 12.0 , INR 1.07       [09-28-17 @ 09:51]  PTT: 31.9       [09-28-17 @ 09:51]      Creatinine Trend:  SCr 3.75 [09-29 @ 06:30]  SCr 4.11 [09-28 @ 06:30]  SCr 4.30 [09-27 @ 06:30]  SCr 4.61 [09-27 @ 00:40]  SCr 5.21 [09-26 @ 07:35]    Urinalysis - [09-26-17 @ 15:02]      Color PLYEL / Appearance CLEAR / SG 1.010 / pH 6.5      Gluc 50 / Ketone NEGATIVE  / Bili NEGATIVE / Urobili NORMAL       Blood NEGATIVE / Protein 30 / Leuk Est NEGATIVE / Nitrite NEGATIVE      RBC 0-2 / WBC 0-2 / Hyaline  / Gran  / Sq Epi OCC / Non Sq Epi  / Bacteria     Urine Creatinine 35.32      [09-26-17 @ 08:34]  Urine Protein 22.7      [09-26-17 @ 09:21]  Urine Sodium 102      [09-26-17 @ 08:34]    Iron 36, TIBC 202, %sat --      [09-27-17 @ 06:30]  Ferritin 112.8      [09-27-17 @ 06:30]  PTH 8.12 (Ca --)      [09-26-17 @ 07:35]   --  Vitamin D (25OH) 12.3      [09-26-17 @ 07:35]  HbA1c 6.9      [09-27-17 @ 06:30]      ANCA: cANCA Negative, pANCA Negative, atypical ANCA --      [09-26-17 @ 09:09]  PR3-ANCA <5.0, interpretation: --      [09-26-17 @ 09:09]  Free Light Chains: kappa 17.40, lambda 8.98, ratio = --      [09-26 @ 09:09] [facility] DIVISION OF KIDNEY DISEASES AND HYPERTENSION -- [phone]   FOLLOW UP NOTE  --------------------------------------------------------------------------------  HPI:  47-year-old female with known history of sarcoidosis, DM presented to [facility] with complaints of weakness, back pain and LE pain. Pt. found to have MIKALA and hypercalcemia. Pt currently on IVF with improvement in serum creatinine and calcium levels. Pt. seen and examined at bedside. Pt. states she was SOB overnight, improved this AM. Pt also reports headache.  Pt. denies chest pain, LE edema.  Pt. s/p axillary  LN biopsy on 9/28.    PAST HISTORY  --------------------------------------------------------------------------------  No significant changes to PMH, PSH, FHx, SHx, unless otherwise noted    ALLERGIES & MEDICATIONS  --------------------------------------------------------------------------------  Allergies    No Known Allergies    Intolerances      Standing Inpatient Medications  insulin lispro (HumaLOG) corrective regimen sliding scale   SubCutaneous three times a day before meals  insulin lispro (HumaLOG) corrective regimen sliding scale   SubCutaneous at bedtime  dextrose 5%. 1000 milliLiter(s) IV Continuous <Continuous>  dextrose 50% Injectable 12.5 Gram(s) IV Push once  dextrose 50% Injectable 25 Gram(s) IV Push once  dextrose 50% Injectable 25 Gram(s) IV Push once  influenza   Vaccine 0.5 milliLiter(s) IntraMuscular once    PRN Inpatient Medications  dextrose Gel 1 Dose(s) Oral once PRN  glucagon  Injectable 1 milliGRAM(s) IntraMuscular once PRN  oxyCODONE    5 mG/acetaminophen 325 mG 1 Tablet(s) Oral every 6 hours PRN  melatonin 3 milliGRAM(s) Oral at bedtime PRN      REVIEW OF SYSTEMS  --------------------------------------------------------------------------------  General: + fever  CVS: no chest pain  RESP: + sob, + cough  ABD: no abdominal pain  : no dysuria,  MSK: no edema     VITALS/PHYSICAL EXAM  --------------------------------------------------------------------------------  T(C): 37.9 (09-29-17 @ 08:52), Max: 38.3 (09-28-17 @ 21:48)  HR: 125 (09-29-17 @ 08:52) (99 - 128)  BP: 165/98 (09-29-17 @ 08:52) (141/72 - 166/94)  RR: 17 (09-29-17 @ 08:52) (17 - 22)  SpO2: 95% (09-29-17 @ 08:52) (85% - 100%)  Wt(kg): --  Height (cm): 152.4 (09-28-17 @ 16:07)  Weight (kg): 54 (09-28-17 @ 16:07)  BMI (kg/m2): 23.3 (09-28-17 @ 16:07)  BSA (m2): 1.5 (09-28-17 @ 16:07)      Physical Exam:  	Gen: NAD  	HEENT: MMM  	Pulm: bibasilar crackles   	CV: S1S2  	Abd: Soft, +BS   	Ext: No LE edema B/L  	Neuro: Awake, alert  	Skin: Warm and dry    LABS/STUDIES  --------------------------------------------------------------------------------              9.2    5.04  >-----------<  187      [09-29-17 @ 06:30]              27.8     139  |  105  |  38  ----------------------------<  84      [09-29-17 @ 06:30]  4.7   |  16  |  3.75        Ca     11.7     [09-29-17 @ 06:30]      Mg     1.8     [09-29-17 @ 06:30]      Phos  4.4     [09-29-17 @ 06:30]    TPro  7.0  /  Alb  3.2  /  TBili  0.3  /  DBili  x   /  AST  12  /  ALT  6   /  AlkPhos  54  [09-29-17 @ 06:30]    PT/INR: PT 12.0 , INR 1.07       [09-28-17 @ 09:51]  PTT: 31.9       [09-28-17 @ 09:51]      Creatinine Trend:  SCr 3.75 [09-29 @ 06:30]  SCr 4.11 [09-28 @ 06:30]  SCr 4.30 [09-27 @ 06:30]  SCr 4.61 [09-27 @ 00:40]  SCr 5.21 [09-26 @ 07:35]    Urinalysis - [09-26-17 @ 15:02]      Color PLYEL / Appearance CLEAR / SG 1.010 / pH 6.5      Gluc 50 / Ketone NEGATIVE  / Bili NEGATIVE / Urobili NORMAL       Blood NEGATIVE / Protein 30 / Leuk Est NEGATIVE / Nitrite NEGATIVE      RBC 0-2 / WBC 0-2 / Hyaline  / Gran  / Sq Epi OCC / Non Sq Epi  / Bacteria     Urine Creatinine 35.32      [09-26-17 @ 08:34]  Urine Protein 22.7      [09-26-17 @ 09:21]  Urine Sodium 102      [09-26-17 @ 08:34]    Iron 36, TIBC 202, %sat --      [09-27-17 @ 06:30]  Ferritin 112.8      [09-27-17 @ 06:30]  PTH 8.12 (Ca --)      [09-26-17 @ 07:35]   --  Vitamin D (25OH) 12.3      [09-26-17 @ 07:35]  HbA1c 6.9      [09-27-17 @ 06:30]      ANCA: cANCA Negative, pANCA Negative, atypical ANCA --      [09-26-17 @ 09:09]  PR3-ANCA <5.0, interpretation: --      [09-26-17 @ 09:09]  Free Light Chains: kappa 17.40, lambda 8.98, ratio = --      [09-26 @ 09:09] [facility] DIVISION OF KIDNEY DISEASES AND HYPERTENSION -- [phone]   FOLLOW UP NOTE  --------------------------------------------------------------------------------  HPI: 47-year-old female with known history of sarcoidosis, DM presented to [facility] with complaints of weakness, back pain and LE pain. Pt. found to have MIKALA and hypercalcemia. Pt. received IVF with improvement in serum creatinine and calcium levels. Pt. seen and examined at bedside. Pt. states she was SOB overnight, improved this AM. IVF discontinued. Pt. also reports headache.  Pt. denies chest pain, LE edema.  Pt. s/p left axillary LN biopsy yesterday.     PAST HISTORY  --------------------------------------------------------------------------------  No significant changes to PMH, PSH, FHx, SHx, unless otherwise noted    ALLERGIES & MEDICATIONS  --------------------------------------------------------------------------------  Allergies    No Known Allergies    Intolerances    Standing Inpatient Medications  insulin lispro (HumaLOG) corrective regimen sliding scale   SubCutaneous three times a day before meals  insulin lispro (HumaLOG) corrective regimen sliding scale   SubCutaneous at bedtime  dextrose 5%. 1000 milliLiter(s) IV Continuous <Continuous>  dextrose 50% Injectable 12.5 Gram(s) IV Push once  dextrose 50% Injectable 25 Gram(s) IV Push once  dextrose 50% Injectable 25 Gram(s) IV Push once  influenza   Vaccine 0.5 milliLiter(s) IntraMuscular once    PRN Inpatient Medications  dextrose Gel 1 Dose(s) Oral once PRN  glucagon  Injectable 1 milliGRAM(s) IntraMuscular once PRN  oxyCODONE    5 mG/acetaminophen 325 mG 1 Tablet(s) Oral every 6 hours PRN  melatonin 3 milliGRAM(s) Oral at bedtime PRN      REVIEW OF SYSTEMS  --------------------------------------------------------------------------------  General: + fever  CVS: no chest pain  RESP: + sob, + cough  ABD: no abdominal pain  : no dysuria,  MSK: no edema     VITALS/PHYSICAL EXAM  --------------------------------------------------------------------------------  T(C): 37.9 (09-29-17 @ 08:52), Max: 38.3 (09-28-17 @ 21:48)  HR: 125 (09-29-17 @ 08:52) (99 - 128)  BP: 165/98 (09-29-17 @ 08:52) (141/72 - 166/94)  RR: 17 (09-29-17 @ 08:52) (17 - 22)  SpO2: 95% (09-29-17 @ 08:52) (85% - 100%)  Wt(kg): --  Height (cm): 152.4 (09-28-17 @ 16:07)  Weight (kg): 54 (09-28-17 @ 16:07)  BMI (kg/m2): 23.3 (09-28-17 @ 16:07)  BSA (m2): 1.5 (09-28-17 @ 16:07)      Physical Exam:  	Gen: NAD  	HEENT: MMM  	Pulm: bilateral crackles heard   	CV: S1S2  	Abd: Soft, +BS   	Ext: No LE edema B/L  	Neuro: Awake, alert  	Skin: Warm and dry    LABS/STUDIES  --------------------------------------------------------------------------------              9.2    5.04  >-----------<  187      [09-29-17 @ 06:30]              27.8     139  |  105  |  38  ----------------------------<  84      [09-29-17 @ 06:30]  4.7   |  16  |  3.75        Ca     11.7     [09-29-17 @ 06:30]      Mg     1.8     [09-29-17 @ 06:30]      Phos  4.4     [09-29-17 @ 06:30]    TPro  7.0  /  Alb  3.2  /  TBili  0.3  /  DBili  x   /  AST  12  /  ALT  6   /  AlkPhos  54  [09-29-17 @ 06:30]    PT/INR: PT 12.0 , INR 1.07       [09-28-17 @ 09:51]  PTT: 31.9       [09-28-17 @ 09:51]    Creatinine Trend:  SCr 3.75 [09-29 @ 06:30]  SCr 4.11 [09-28 @ 06:30]  SCr 4.30 [09-27 @ 06:30]  SCr 4.61 [09-27 @ 00:40]  SCr 5.21 [09-26 @ 07:35]    Urinalysis - [09-26-17 @ 15:02]      Color PLYEL / Appearance CLEAR / SG 1.010 / pH 6.5      Gluc 50 / Ketone NEGATIVE  / Bili NEGATIVE / Urobili NORMAL       Blood NEGATIVE / Protein 30 / Leuk Est NEGATIVE / Nitrite NEGATIVE      RBC 0-2 / WBC 0-2 / Hyaline  / Gran  / Sq Epi OCC / Non Sq Epi  / Bacteria     Urine Creatinine 35.32      [09-26-17 @ 08:34]  Urine Protein 22.7      [09-26-17 @ 09:21]  Urine Sodium 102      [09-26-17 @ 08:34]    Iron 36, TIBC 202, %sat --      [09-27-17 @ 06:30]  Ferritin 112.8      [09-27-17 @ 06:30]  PTH 8.12 (Ca --)      [09-26-17 @ 07:35]   --  Vitamin D (25OH) 12.3      [09-26-17 @ 07:35]  HbA1c 6.9      [09-27-17 @ 06:30]    ANCA: cANCA Negative, pANCA Negative, atypical ANCA --      [09-26-17 @ 09:09]  PR3-ANCA <5.0, interpretation: --      [09-26-17 @ 09:09]  Free Light Chains: kappa 17.40, lambda 8.98, ratio = --      [09-26 @ 09:09]

## 2017-09-29 NOTE — PROGRESS NOTE ADULT - PROBLEM SELECTOR PLAN 6
Possible 2/2 sarcoid. ANCAs neg no other evidence of vasculitis clinically   -Trend CBC A1C 1 month ago was 7.8; FS yesterday , no SSI administered   -FS TID   -Diabetic diet  -Hold metformin   -Sliding scale

## 2017-09-29 NOTE — PROGRESS NOTE ADULT - PROBLEM SELECTOR PLAN 2
Pt. with mild hyperkalemia in setting of renal failure. Serum potassium improved to 4.7 today. Low potassium diet. Monitor serum potassium

## 2017-09-30 LAB
ALBUMIN SERPL ELPH-MCNC: 3.9 G/DL — SIGNIFICANT CHANGE UP (ref 3.3–5)
ALP SERPL-CCNC: 72 U/L — SIGNIFICANT CHANGE UP (ref 40–120)
ALT FLD-CCNC: 6 U/L — SIGNIFICANT CHANGE UP (ref 4–33)
AST SERPL-CCNC: 12 U/L — SIGNIFICANT CHANGE UP (ref 4–32)
BILIRUB SERPL-MCNC: 0.4 MG/DL — SIGNIFICANT CHANGE UP (ref 0.2–1.2)
BUN SERPL-MCNC: 43 MG/DL — HIGH (ref 7–23)
CALCIUM SERPL-MCNC: 12.4 MG/DL — HIGH (ref 8.4–10.5)
CHLORIDE SERPL-SCNC: 98 MMOL/L — SIGNIFICANT CHANGE UP (ref 98–107)
CO2 SERPL-SCNC: 20 MMOL/L — LOW (ref 22–31)
CREAT SERPL-MCNC: 3.9 MG/DL — HIGH (ref 0.5–1.3)
GLUCOSE SERPL-MCNC: 246 MG/DL — HIGH (ref 70–99)
HCT VFR BLD CALC: 30.7 % — LOW (ref 34.5–45)
HGB BLD-MCNC: 10.1 G/DL — LOW (ref 11.5–15.5)
L PNEUMO AG UR QL: NEGATIVE — SIGNIFICANT CHANGE UP
MAGNESIUM SERPL-MCNC: 1.9 MG/DL — SIGNIFICANT CHANGE UP (ref 1.6–2.6)
MCHC RBC-ENTMCNC: 25.6 PG — LOW (ref 27–34)
MCHC RBC-ENTMCNC: 32.9 % — SIGNIFICANT CHANGE UP (ref 32–36)
MCV RBC AUTO: 77.7 FL — LOW (ref 80–100)
NRBC # FLD: 0 — SIGNIFICANT CHANGE UP
PHOSPHATE SERPL-MCNC: 6.3 MG/DL — HIGH (ref 2.5–4.5)
PLATELET # BLD AUTO: 225 K/UL — SIGNIFICANT CHANGE UP (ref 150–400)
PMV BLD: 9.5 FL — SIGNIFICANT CHANGE UP (ref 7–13)
POTASSIUM SERPL-MCNC: 5.3 MMOL/L — SIGNIFICANT CHANGE UP (ref 3.5–5.3)
POTASSIUM SERPL-SCNC: 5.3 MMOL/L — SIGNIFICANT CHANGE UP (ref 3.5–5.3)
PROT SERPL-MCNC: 8.5 G/DL — HIGH (ref 6–8.3)
RBC # BLD: 3.95 M/UL — SIGNIFICANT CHANGE UP (ref 3.8–5.2)
RBC # FLD: 12.9 % — SIGNIFICANT CHANGE UP (ref 10.3–14.5)
SODIUM SERPL-SCNC: 137 MMOL/L — SIGNIFICANT CHANGE UP (ref 135–145)
VANCOMYCIN TROUGH SERPL-MCNC: 15.1 UG/ML — SIGNIFICANT CHANGE UP (ref 10–20)
WBC # BLD: 3.77 K/UL — LOW (ref 3.8–10.5)
WBC # FLD AUTO: 3.77 K/UL — LOW (ref 3.8–10.5)

## 2017-09-30 PROCEDURE — 99232 SBSQ HOSP IP/OBS MODERATE 35: CPT | Mod: GC

## 2017-09-30 RX ORDER — VANCOMYCIN HCL 1 G
500 VIAL (EA) INTRAVENOUS EVERY 24 HOURS
Qty: 0 | Refills: 0 | Status: COMPLETED | OUTPATIENT
Start: 2017-09-30 | End: 2017-09-30

## 2017-09-30 RX ADMIN — Medication 100 MILLIGRAM(S): at 14:43

## 2017-09-30 RX ADMIN — HEPARIN SODIUM 5000 UNIT(S): 5000 INJECTION INTRAVENOUS; SUBCUTANEOUS at 05:33

## 2017-09-30 RX ADMIN — AMPICILLIN SODIUM AND SULBACTAM SODIUM 100 GRAM(S): 250; 125 INJECTION, POWDER, FOR SUSPENSION INTRAMUSCULAR; INTRAVENOUS at 05:32

## 2017-09-30 RX ADMIN — Medication 2: at 12:48

## 2017-09-30 RX ADMIN — Medication 2: at 08:57

## 2017-09-30 RX ADMIN — AMPICILLIN SODIUM AND SULBACTAM SODIUM 100 GRAM(S): 250; 125 INJECTION, POWDER, FOR SUSPENSION INTRAMUSCULAR; INTRAVENOUS at 17:08

## 2017-09-30 RX ADMIN — Medication 1: at 17:08

## 2017-09-30 RX ADMIN — HEPARIN SODIUM 5000 UNIT(S): 5000 INJECTION INTRAVENOUS; SUBCUTANEOUS at 14:44

## 2017-09-30 NOTE — PROGRESS NOTE ADULT - PROBLEM SELECTOR PLAN 2
Slghtly elevated today. Would benefit from steroids given elevated Vit D 1,25 pt s/p 1 dose prednisone last night  - Ca uptrended today likely 2/2 d/c of IV fluids   -Strict I/Os  - Flow cytometry and LN biopsy pending for lymphoma work up Sightly elevated today. Would benefit from steroids given elevated Vit D 1,25 pt s/p 1 dose prednisone last nigh. Ca uptrended today likely 2/2 d/c of IV fluids   -Strict I/Os  - Flow cytometry and LN biopsy pending for lymphoma work up

## 2017-09-30 NOTE — PROGRESS NOTE ADULT - PROBLEM SELECTOR PLAN 3
Stable. s/p 1 dose of Lasix yesterday. W/u for myeloma entirely neg. 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 Infectious w/u entirely negative. Differential includes tumor fever vs PE vs autoimmune process   -Will monitor response to steroids.

## 2017-09-30 NOTE — PROGRESS NOTE ADULT - PROBLEM SELECTOR PLAN 9
DVT PPX:Improve Score .4%. Low risk  Dispo:pending work up Pt has been off treatment for over 3 years because she has not followed up with her pulmonologist  Rheum consult appreciated. Pt has hx of biopsy proven sarcoid in 2007 and LN Bx in 2014 showing sarcoid. EF 65% no evidence of sarcoid on TTE thought MRI would be most sensitive test.  -Will touch base with rheumatology regarding steroids   -F/u ACE level

## 2017-09-30 NOTE — PROGRESS NOTE ADULT - SUBJECTIVE AND OBJECTIVE BOX
[facility] Division of Kidney Diseases & Hypertension  FOLLOW UP NOTE  [phone]--------------------------------------------------------------------------------    HPI: 47-year-old female with known history of sarcoidosis, DM presented to [facility] with complaints of weakness, back pain and LE pain. Pt. found to have MIKALA and hypercalcemia. Pt. received IVF with improvement in serum creatinine and calcium levels. Pt. seen and examined at bedside. Pt. states SOB has improved after receiving lasix. IVF discontinued.  Pt. denies chest pain, LE edema.  Pt. s/p left axillary LN biopsy on 9/28/17.      PAST HISTORY  --------------------------------------------------------------------------------  No significant changes to PMH, PSH, FHx, SHx, unless otherwise noted    ALLERGIES & MEDICATIONS  --------------------------------------------------------------------------------  Allergies    No Known Allergies    Intolerances      Standing Inpatient Medications  insulin lispro (HumaLOG) corrective regimen sliding scale   SubCutaneous three times a day before meals  insulin lispro (HumaLOG) corrective regimen sliding scale   SubCutaneous at bedtime  dextrose 5%. 1000 milliLiter(s) IV Continuous <Continuous>  dextrose 50% Injectable 12.5 Gram(s) IV Push once  dextrose 50% Injectable 25 Gram(s) IV Push once  dextrose 50% Injectable 25 Gram(s) IV Push once  influenza   Vaccine 0.5 milliLiter(s) IntraMuscular once  ampicillin/sulbactam  IVPB      heparin  Injectable 5000 Unit(s) SubCutaneous every 8 hours  ampicillin/sulbactam  IVPB 1.5 Gram(s) IV Intermittent every 12 hours    PRN Inpatient Medications  dextrose Gel 1 Dose(s) Oral once PRN  glucagon  Injectable 1 milliGRAM(s) IntraMuscular once PRN  oxyCODONE    5 mG/acetaminophen 325 mG 1 Tablet(s) Oral every 6 hours PRN  melatonin 3 milliGRAM(s) Oral at bedtime PRN      REVIEW OF SYSTEMS  --------------------------------------------------------------------------------  General: + fever  CVS: no chest pain  RESP: + sob, + cough  ABD: no abdominal pain  : no dysuria,  MSK: no edema     VITALS/PHYSICAL EXAM  --------------------------------------------------------------------------------  T(C): 36.4 (09-30-17 @ 05:29), Max: 39 (09-29-17 @ 19:31)  HR: 61 (09-30-17 @ 05:29) (61 - 121)  BP: 133/79 (09-30-17 @ 05:29) (133/79 - 148/91)  RR: 16 (09-30-17 @ 05:29) (16 - 19)  SpO2: 100% (09-30-17 @ 05:29) (96% - 100%)  Wt(kg): --  Height (cm): 152.4 (09-28-17 @ 16:07)  Weight (kg): 54 (09-28-17 @ 16:07)  BMI (kg/m2): 23.3 (09-28-17 @ 16:07)  BSA (m2): 1.5 (09-28-17 @ 16:07)      09-29-17 @ 07:01  -  09-30-17 @ 07:00  --------------------------------------------------------  IN: 0 mL / OUT: 1800 mL / NET: -1800 mL      Physical Exam:  	Gen: NAD  	HEENT: MMM  	Pulm: bilateral crackles heard   	CV: S1S2  	Abd: Soft, +BS   	Ext: No LE edema B/L  	Neuro: Awake, alert  	Skin: Warm and dry    LABS/STUDIES  --------------------------------------------------------------------------------              10.1   3.77  >-----------<  225      [09-30-17 @ 05:40]              30.7     137  |  98  |  43  ----------------------------<  246      [09-30-17 @ 05:40]  5.3   |  20  |  3.90        Ca     12.4     [09-30-17 @ 05:40]      Mg     1.9     [09-30-17 @ 05:40]      Phos  6.3     [09-30-17 @ 05:40]    TPro  8.5  /  Alb  3.9  /  TBili  0.4  /  DBili  x   /  AST  12  /  ALT  6   /  AlkPhos  72  [09-30-17 @ 05:40]        Uric acid 7.7      [09-29-17 @ 06:30]    Creatinine Trend:  SCr 3.90 [09-30 @ 05:40]  SCr 3.75 [09-29 @ 06:30]  SCr 4.11 [09-28 @ 06:30]  SCr 4.30 [09-27 @ 06:30]  SCr 4.61 [09-27 @ 00:40]    Urinalysis - [09-26-17 @ 15:02]      Color PLYEL / Appearance CLEAR / SG 1.010 / pH 6.5      Gluc 50 / Ketone NEGATIVE  / Bili NEGATIVE / Urobili NORMAL       Blood NEGATIVE / Protein 30 / Leuk Est NEGATIVE / Nitrite NEGATIVE      RBC 0-2 / WBC 0-2 / Hyaline  / Gran  / Sq Epi OCC / Non Sq Epi  / Bacteria     Urine Creatinine 35.32      [09-26-17 @ 08:34]  Urine Protein 22.7      [09-26-17 @ 09:21]  Urine Sodium 102      [09-26-17 @ 08:34]    Iron 36, TIBC 202, %sat --      [09-27-17 @ 06:30]  Ferritin 112.8      [09-27-17 @ 06:30]  PTH 8.12 (Ca --)      [09-26-17 @ 07:35]   --  Vitamin D (25OH) 12.3      [09-26-17 @ 07:35]  HbA1c 6.9      [09-27-17 @ 06:30]      ANCA: cANCA Negative, pANCA Negative, atypical ANCA --      [09-26-17 @ 09:09]  PR3-ANCA <5.0, interpretation: --      [09-26-17 @ 09:09]  Free Light Chains: kappa 17.40, lambda 8.98, ratio = 1.94      [09-26 @ 09:09] [facility] Division of Kidney Diseases & Hypertension  FOLLOW UP NOTE  [phone]--------------------------------------------------------------------------------    HPI: 47-year-old female with known history of sarcoidosis, DM presented to [facility] with complaints of weakness, back pain and LE pain. Pt. found to have MIKALA and hypercalcemia. Pt. received IVF with improvement in serum creatinine and calcium levels. Pt. seen and examined at bedside. Pt. states SOB has improved after receiving lasix. IVF discontinued. Pt. denies chest pain, LE edema.  Pt. s/p left axillary LN biopsy on 9/28/17.      PAST HISTORY  --------------------------------------------------------------------------------  No significant changes to PMH, PSH, FHx, SHx, unless otherwise noted    ALLERGIES & MEDICATIONS  --------------------------------------------------------------------------------  Allergies    No Known Allergies    Intolerances      Standing Inpatient Medications  insulin lispro (HumaLOG) corrective regimen sliding scale   SubCutaneous three times a day before meals  insulin lispro (HumaLOG) corrective regimen sliding scale   SubCutaneous at bedtime  dextrose 5%. 1000 milliLiter(s) IV Continuous <Continuous>  dextrose 50% Injectable 12.5 Gram(s) IV Push once  dextrose 50% Injectable 25 Gram(s) IV Push once  dextrose 50% Injectable 25 Gram(s) IV Push once  influenza   Vaccine 0.5 milliLiter(s) IntraMuscular once  ampicillin/sulbactam  IVPB      heparin  Injectable 5000 Unit(s) SubCutaneous every 8 hours  ampicillin/sulbactam  IVPB 1.5 Gram(s) IV Intermittent every 12 hours    PRN Inpatient Medications  dextrose Gel 1 Dose(s) Oral once PRN  glucagon  Injectable 1 milliGRAM(s) IntraMuscular once PRN  oxyCODONE    5 mG/acetaminophen 325 mG 1 Tablet(s) Oral every 6 hours PRN  melatonin 3 milliGRAM(s) Oral at bedtime PRN      REVIEW OF SYSTEMS  --------------------------------------------------------------------------------  General: + fever  CVS: no chest pain  RESP: + improved SOB, + cough  ABD: no abdominal pain  : no dysuria  MSK: no edema     VITALS/PHYSICAL EXAM  --------------------------------------------------------------------------------  T(C): 36.4 (09-30-17 @ 05:29), Max: 39 (09-29-17 @ 19:31)  HR: 61 (09-30-17 @ 05:29) (61 - 121)  BP: 133/79 (09-30-17 @ 05:29) (133/79 - 148/91)  RR: 16 (09-30-17 @ 05:29) (16 - 19)  SpO2: 100% (09-30-17 @ 05:29) (96% - 100%)  Wt(kg): --  Height (cm): 152.4 (09-28-17 @ 16:07)  Weight (kg): 54 (09-28-17 @ 16:07)  BMI (kg/m2): 23.3 (09-28-17 @ 16:07)  BSA (m2): 1.5 (09-28-17 @ 16:07)      09-29-17 @ 07:01  -  09-30-17 @ 07:00  --------------------------------------------------------  IN: 0 mL / OUT: 1800 mL / NET: -1800 mL      Physical Exam:  	Gen: NAD  	Pulm: bilateral crackles heard   	CV: S1S2  	Abd: Soft, +BS   	Ext: No LE edema B/L  	Neuro: Awake, alert  	Skin: Warm and dry    LABS/STUDIES  --------------------------------------------------------------------------------              10.1   3.77  >-----------<  225      [09-30-17 @ 05:40]              30.7     137  |  98  |  43  ----------------------------<  246      [09-30-17 @ 05:40]  5.3   |  20  |  3.90        Ca     12.4     [09-30-17 @ 05:40]      Mg     1.9     [09-30-17 @ 05:40]      Phos  6.3     [09-30-17 @ 05:40]    TPro  8.5  /  Alb  3.9  /  TBili  0.4  /  DBili  x   /  AST  12  /  ALT  6   /  AlkPhos  72  [09-30-17 @ 05:40]    Uric acid 7.7      [09-29-17 @ 06:30]    Creatinine Trend:  SCr 3.90 [09-30 @ 05:40]  SCr 3.75 [09-29 @ 06:30]  SCr 4.11 [09-28 @ 06:30]  SCr 4.30 [09-27 @ 06:30]  SCr 4.61 [09-27 @ 00:40]    Urinalysis - [09-26-17 @ 15:02]      Color PLYEL / Appearance CLEAR / SG 1.010 / pH 6.5      Gluc 50 / Ketone NEGATIVE  / Bili NEGATIVE / Urobili NORMAL       Blood NEGATIVE / Protein 30 / Leuk Est NEGATIVE / Nitrite NEGATIVE      RBC 0-2 / WBC 0-2 / Hyaline  / Gran  / Sq Epi OCC / Non Sq Epi  / Bacteria     Urine Creatinine 35.32      [09-26-17 @ 08:34]  Urine Protein 22.7      [09-26-17 @ 09:21]  Urine Sodium 102      [09-26-17 @ 08:34]    Iron 36, TIBC 202, %sat --      [09-27-17 @ 06:30]  Ferritin 112.8      [09-27-17 @ 06:30]  PTH 8.12 (Ca --)      [09-26-17 @ 07:35]   --  Vitamin D (25OH) 12.3      [09-26-17 @ 07:35]  HbA1c 6.9      [09-27-17 @ 06:30]    ANCA: cANCA Negative, pANCA Negative, atypical ANCA --      [09-26-17 @ 09:09]  PR3-ANCA <5.0, interpretation: --      [09-26-17 @ 09:09]  Free Light Chains: kappa 17.40, lambda 8.98, ratio = 1.94      [09-26 @ 09:09]

## 2017-09-30 NOTE — PROGRESS NOTE ADULT - PROBLEM SELECTOR PLAN 2
Pt presenting with severe hypercalcemia in the setting of known sarcoidosis. Pt with Vit D 1,25 505 with otherwise normal hypercalcemia w/u.  - Ca uptrended today to 11.7 on fluids  - on 100 cc/hr NS ON; dc this AM as patient is increasingly dyspneic and has crackles  -Strict I/Os  -Pt will benefit from steroids given elevated Vit D but will have to first r/o Lymphoma as a cause of elevated Vit D 1,25  - Flow cytometry sent this AM Pt presenting with severe hypercalcemia in the setting of known sarcoidosis. Pt with Vit D 1,25 505 with otherwise normal hypercalcemia w/u.  - Ca uptrended today to 12.4 off fluids  - IVF held yesterday AM as patient was increasingly dyspneic and had crackles  - Strict I/Os  - Pt will benefit from steroids given elevated Vit D but will have to first r/o Lymphoma as a cause of elevated Vit D 1,25  - Flow cytometry sent this AM Pt presenting with severe hypercalcemia in the setting of known sarcoidosis. Pt with Vit D 1,25 505 with otherwise normal hypercalcemia w/u.  - Ca uptrended today to 12.4 off fluids  - IVF held yesterday AM as patient was increasingly dyspneic and had crackles  - Strict I/Os  - Pt will benefit from steroids given elevated Vit D but will have to first r/o Lymphoma as a cause of elevated Vit D 1,25  - f/u flow cytometry

## 2017-09-30 NOTE — PROGRESS NOTE ADULT - ASSESSMENT
47yoF hx of sarcoidosis (dx 2010 via biopsy showing noncaseating granulomas not on any treatment), T2DM, sent in from outside rheumatologist for abnormal labs showing MIKALA and hypercalcemia, in setting of subacute-to-acute sx/sx including weight loss, night sweats, and dyspnea on exertion. Pt on admission (9/25) had Ca+ 15.3, creatinine 5.65, diffuse lymphadenopathy on physical exam and CT chest. As of 9/29, Ca improved to <12 and Cr to <4. S/p biopsy of axillary lymph node 9/28. Course complicated by event on 9/28 including hypoxia (SpO2 85%), tachycardia (HRmax 128), and hemoptysis, thought likely to be acute pulmonary edema in setting of acute left-sided heart failure -- supported by high volume of IV fluid input during admission, BNP >13,000. Another possible option is pulmonary embolism (Modified Wells 2.5 based on tachycardia + hemoptysis i.e. intermediate probability, V/Q scan deferred on 9/28 due to patient's concern about lying flat for extended period, CTA inadvisable in setting of MIKALA). Diff Dx for fever & SOB also includes pneumonia (anion gap increased to 18 on 9/29 from 13 since 9/28); as of 9/29, on Unasyn and Vancomycin.  Patient's known pulmonary sarcoid seems unlikely to have progressed so acutely.     PLAN  1) Hypercalcemia- differential includes sarcoidosis vs. malignancy such as MM/lymphoma. Pt has diagnosed sarcoid not on any treatment  HyperCa+ can be due to excess Vit D production by granulomas. However, she should also undergo w/u to rule out multiple myeloma  - f/u immunofixation inpatient studies (as outpatient 9/22, showed no monoclonal proteins in either Urine immunofixation or Serum immunofixation)  - f/u IR-guided core biopsy  - In anticipation of possible steroid use, consider checking Acute Hepatitis Panel (negative as outpatient 9/22/17). TB Quant Gold negative.   - Rheum to discuss with Pulm RE: advisability of initiating steroids with one-time dose of prednisone 50mg PO; final recommendations per attending's addendum. (Recommend prednisone rather than methylprednisolone, because patient has history of tolerating prednisone and a questionable history of skin reaction in response to methylprednisolone)    2) MIKALA - MIKALA may be 2/2 hypercalcemia and/or AIN 2/2 NSAID use. Pt also notes recent Abx use for UTI treatment  - UA appears non-infectious; Urine culture negative  - s/p furosemide 40mg IV x1 on 9/29; holding IV fluids due to concern that dyspnea and cough 2/2 fluid overload  - US renal w/o hydronephrosis, with parenchyma echogenic    3) Anemia- pt has microcytic anemia w Retic Index 0.5%, likely failure of production (low EPO vs lymphoma vs parvovirus infection vs other)  - Fe wnl, TIBC wnl, UIBC wnl, Ferritin wnl, LDH low, haptoglobin wnl    4) Acute exacerbation of progressive ENGLISH - concern for pulm vs CARDIAC sarcoid vs possible pericardial effusion (though none seen on CT chest) 2/2 renal failure/uremia  - Perform TTE  - Consider V/Q scan if patient able to tolerate    5) Fever, chills, night sweats, weight loss; diffuse lymphadenopathy; hx of R groin lymph-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 Will start prednisone 50mg once prelim of LN back. 47yoF hx of sarcoidosis (dx 2010 via biopsy showing noncaseating granulomas not on any treatment), T2DM, with hypercalcemia, MIKALA, weight loss, night sweats, diffuse LAD, s/p axillary LN bx on 9/28, differentials include hypercalcemia of malignancy vs sarcoidosis    PLAN  Plan:  -Ideally hold prednisone till preliminary results of LN back. Given fluid overload, would give gentle IVF for hypercalcemia, alternatively consider calcitonin.    TBD 47yoF hx of sarcoidosis (dx 2010 via biopsy showing noncaseating granulomas not on any treatment), T2DM, with hypercalcemia, MIKALA, weight loss, night sweats, diffuse LAD, s/p axillary LN bx on 9/28, differentials include hypercalcemia of malignancy vs sarcoidosis    PLAN  Plan:  -Ideally hold prednisone till preliminary results of LN biopsy back since prednisone can distort clinical presentation if malignancy is present. Given fluid overload, would give gentle IVF for hypercalcemia, alternatively consider calcitonin.     JUAN Osborne

## 2017-09-30 NOTE — PROGRESS NOTE ADULT - ASSESSMENT
47-year-old female with known history of sarcoidosis, DM presented to [facility] with complaints of weakness, back pain and LE pain. Pt. found to have MIKALA and hypercalcemia.

## 2017-09-30 NOTE — PROGRESS NOTE ADULT - PROBLEM SELECTOR PLAN 3
Pt. with hypercalcemia in setting of sarcoidosis/lymphadenopathy. Serum calcium is 12.4  today. IV fluids were discontinued. Was started on steroids by primary team. Monitor serum calcium. Pt. with hypercalcemia in setting of sarcoidosis/lymphadenopathy. Serum calcium elevated at 12.4  today. IV fluids were discontinued. Pt. received a dose of oral prednisone yesterday. Monitor serum calcium

## 2017-09-30 NOTE — PROGRESS NOTE ADULT - SUBJECTIVE AND OBJECTIVE BOX
LEONEL SIRIA  [id number]    INTERVAL HPI/OVERNIGHT EVENTS:  Respiratory status has improved after IV diuresis.     MEDICATIONS  (STANDING):  insulin lispro (HumaLOG) corrective regimen sliding scale   SubCutaneous three times a day before meals  insulin lispro (HumaLOG) corrective regimen sliding scale   SubCutaneous at bedtime  influenza   Vaccine 0.5 milliLiter(s) IntraMuscular once  vancomycin  IVPB 750 milliGRAM(s) IV Intermittent once  ampicillin/sulbactam  IVPB      heparin  Injectable 5000 Unit(s) SubCutaneous every 8 hours    MEDICATIONS  (PRN):  dextrose Gel 1 Dose(s) Oral once PRN Blood Glucose LESS THAN 70 milliGRAM(s)/deciliter  glucagon  Injectable 1 milliGRAM(s) IntraMuscular once PRN Glucose LESS THAN 70 milligrams/deciliter  oxyCODONE    5 mG/acetaminophen 325 mG 1 Tablet(s) Oral every 6 hours PRN Severe Pain (7 - 10)  melatonin 3 milliGRAM(s) Oral at bedtime PRN Insomnia    Allergies: No Known Allergies -- past history of skin lesions in association with ?Medrol dose pack, but also took prednisone without incident as recently as ~3 years ago    Review of Systems:   General:   HEENT:   CVS:   Resp: Most recent hemoptysis 9/29 AM  GI:   MSK:   Skin:   Neuro:       Vital Signs Last 24 Hrs  T(C): 37.7 (29 Sep 2017 14:06), Max: 38.3 (28 Sep 2017 21:48)  T(F): 99.9 (29 Sep 2017 14:06), Max: 100.9 (28 Sep 2017 21:48)  HR: 104 (29 Sep 2017 14:06) (103 - 128)  BP: 148/91 (29 Sep 2017 14:06) (141/72 - 166/94)  RR: 19 (29 Sep 2017 14:06) (17 - 22)  SpO2: 100% (29 Sep 2017 14:06) (85% - 100%)    Physical Exam:  General: Anxious-appearing  Cardio: Regular rate and rhythm  Resp: Posterior lung fields with crackles up to mid-lung-field  Abd: Soft, non-tender  Ext: Lower extremities without pitting edema  Neuro: Moving all four extremities  Psych: Coherent, goal-oriented    LABS:                        9.2    5.04  )-----------( 187      ( 29 Sep 2017 06:30 )             27.8     09-29    139  |  105      |  38<H>  ----------------------------<  84  4.7   |  16<L>  |  3.75<H>    Ca    11.7<H>      29 Sep 2017 06:30  Phos  4.4     09-29  Mg     1.8     09-29    TPro  7.0  /  Alb  3.2<L>  /  TBili  0.3  /  DBili  x   /  AST  12  /  ALT  6   /  AlkPhos  54  09-29    PT/INR - ( 28 Sep 2017 09:51 )   PT: 12.0 SEC;   INR: 1.07     PTT - ( 28 Sep 2017 09:51 )  PTT:31.9 SEC    Uric Acid, Serum: 7.7 mg/dL (09.29.17 @ 06:30)    Serum Pro-Brain Natriuretic Peptide: 19649  (09.29.17 @ 06:30)    CONSIDER ACUTE CONGESTIVE HEART FAILURE if:    AGE                NT-proBNP RESULT  ---                -------------  < 50 YEARS      >  450 pg/mL    Quantiferon - Gold Tuberculosis (09.28.17 @ 06:30)    Quantiferon - Gold Tuberculosis Result: Negative    Culture - Blood (09.28.17 @ 14:44)    Culture - Blood: NO ORGANISMS ISOLATED AT 24 HOURS    Specimen Source: BLOOD VENOUS  Culture - Blood (09.28.17 @ 14:44)    Culture - Blood: NO ORGANISMS ISOLATED AT 24 HOURS    Specimen Source: BLOOD  Culture - Urine: NO GROWTH AT 24 HOURS (09.26.17 @ 19:11)    RADIOLOGY & ADDITIONAL TESTS:  CT Abd/Pelvis W/ PO Contrast 9/29 15:03  "IMPRESSION:   Patchy perihilar opacities and small bilateral pleural effusions which   are new since 9/26/2017 and are consistent with pulmonary edema.  Diffuse bulky lymphadenopathy which is often seen in the setting of   leukemia or lymphoma."    Xray Chest 9/28 19:57  "IMPRESSION:   Bibasilar reticular opacities correspond to known pulmonary fibrosis. No   new focal lung consolidations." [Per primary team and per this author, R costophrenic angle blunting] LEONEL SIRIA  [id number]    INTERVAL HPI/OVERNIGHT EVENTS:  Respiratory status has improved after IV diuresis.     MEDICATIONS  (STANDING):  insulin lispro (HumaLOG) corrective regimen sliding scale   SubCutaneous three times a day before meals  insulin lispro (HumaLOG) corrective regimen sliding scale   SubCutaneous at bedtime  influenza   Vaccine 0.5 milliLiter(s) IntraMuscular once  vancomycin  IVPB 750 milliGRAM(s) IV Intermittent once  ampicillin/sulbactam  IVPB      heparin  Injectable 5000 Unit(s) SubCutaneous every 8 hours    MEDICATIONS  (PRN):  dextrose Gel 1 Dose(s) Oral once PRN Blood Glucose LESS THAN 70 milliGRAM(s)/deciliter  glucagon  Injectable 1 milliGRAM(s) IntraMuscular once PRN Glucose LESS THAN 70 milligrams/deciliter  oxyCODONE    5 mG/acetaminophen 325 mG 1 Tablet(s) Oral every 6 hours PRN Severe Pain (7 - 10)  melatonin 3 milliGRAM(s) Oral at bedtime PRN Insomnia    Allergies: No Known Allergies -- past history of skin lesions in association with ?Medrol dose pack, but also took prednisone without incident as recently as ~3 years ago    Review of Systems:   General:   HEENT:   CVS:   Resp: Most recent hemoptysis 9/29 AM  GI:   MSK:   Skin:   Neuro:       Vital Signs Last 24 Hrs  T(C): 37.7 (29 Sep 2017 14:06), Max: 38.3 (28 Sep 2017 21:48)  T(F): 99.9 (29 Sep 2017 14:06), Max: 100.9 (28 Sep 2017 21:48)  HR: 104 (29 Sep 2017 14:06) (103 - 128)  BP: 148/91 (29 Sep 2017 14:06) (141/72 - 166/94)  RR: 19 (29 Sep 2017 14:06) (17 - 22)  SpO2: 100% (29 Sep 2017 14:06) (85% - 100%)    Physical Exam:  General: Anxious-appearing  Cardio: Regular rate and rhythm  Resp: Posterior lung fields with crackles up to mid-lung-field  Abd: Soft, non-tender  Ext: Lower extremities without pitting edema  Neuro: Moving all four extremities  Psych: Coherent, goal-oriented    LABS:                10.1   3.77  >-----------<  225      [09-30-17 @ 05:40]              30.7     137  |  98  |  43  ----------------------------<  246      [09-30-17 @ 05:40]  5.3   |  20  |  3.90        Ca     12.4     [09-30-17 @ 05:40] <- 11.4      Mg     1.9     [09-30-17 @ 05:40]      Phos  6.3     [09-30-17 @ 05:40]    TPro  8.5  /  Alb  3.9  /  TBili  0.4  /  DBili  x   /  AST  12  /  ALT  6   /  AlkPhos  72  [09-30-17 @ 05:40]    Uric acid 7.7      [09-29-17 @ 06:30]    Creatinine Trend:  SCr 3.90 [09-30 @ 05:40]  SCr 3.75 [09-29 @ 06:30]  SCr 4.11 [09-28 @ 06:30]  SCr 4.30 [09-27 @ 06:30]  SCr 4.61 [09-27 @ 00:40]      Uric Acid, Serum: 7.7 mg/dL (09.29.17 @ 06:30)    Serum Pro-Brain Natriuretic Peptide: 20454  (09.29.17 @ 06:30)    CONSIDER ACUTE CONGESTIVE HEART FAILURE if:    AGE                NT-proBNP RESULT  ---                -------------  < 50 YEARS      >  450 pg/mL    Quantiferon - Gold Tuberculosis (09.28.17 @ 06:30)    Quantiferon - Gold Tuberculosis Result: Negative    Culture - Blood (09.28.17 @ 14:44)    Culture - Blood: NO ORGANISMS ISOLATED AT 24 HOURS    Specimen Source: BLOOD VENOUS  Culture - Blood (09.28.17 @ 14:44)    Culture - Blood: NO ORGANISMS ISOLATED AT 24 HOURS    Specimen Source: BLOOD  Culture - Urine: NO GROWTH AT 24 HOURS (09.26.17 @ 19:11)    RADIOLOGY & ADDITIONAL TESTS:  CT Abd/Pelvis W/ PO Contrast 9/29 15:03  "IMPRESSION:   Patchy perihilar opacities and small bilateral pleural effusions which   are new since 9/26/2017 and are consistent with pulmonary edema.  Diffuse bulky lymphadenopathy which is often seen in the setting of   leukemia or lymphoma."    Xray Chest 9/28 19:57  "IMPRESSION:   Bibasilar reticular opacities correspond to known pulmonary fibrosis. No   new focal lung consolidations." [Per primary team and per this author, R costophrenic angle blunting] LEONEL SIRIA  [id number]    INTERVAL HPI/OVERNIGHT EVENTS:  Respiratory status has improved after IV diuresis. Pt reports no SOB, cough, CP.    MEDICATIONS  (STANDING):  insulin lispro (HumaLOG) corrective regimen sliding scale   SubCutaneous three times a day before meals  insulin lispro (HumaLOG) corrective regimen sliding scale   SubCutaneous at bedtime  influenza   Vaccine 0.5 milliLiter(s) IntraMuscular once  vancomycin  IVPB 750 milliGRAM(s) IV Intermittent once  ampicillin/sulbactam  IVPB      heparin  Injectable 5000 Unit(s) SubCutaneous every 8 hours    MEDICATIONS  (PRN):  dextrose Gel 1 Dose(s) Oral once PRN Blood Glucose LESS THAN 70 milliGRAM(s)/deciliter  glucagon  Injectable 1 milliGRAM(s) IntraMuscular once PRN Glucose LESS THAN 70 milligrams/deciliter  oxyCODONE    5 mG/acetaminophen 325 mG 1 Tablet(s) Oral every 6 hours PRN Severe Pain (7 - 10)  melatonin 3 milliGRAM(s) Oral at bedtime PRN Insomnia    Allergies: No Known Allergies -- past history of skin lesions in association with ?Medrol dose pack, but also took prednisone without incident as recently as ~3 years ago    Vital Signs Last 24 Hrs  T(C): 37.7 (29 Sep 2017 14:06), Max: 38.3 (28 Sep 2017 21:48)  T(F): 99.9 (29 Sep 2017 14:06), Max: 100.9 (28 Sep 2017 21:48)  HR: 104 (29 Sep 2017 14:06) (103 - 128)  BP: 148/91 (29 Sep 2017 14:06) (141/72 - 166/94)  RR: 19 (29 Sep 2017 14:06) (17 - 22)  SpO2: 100% (29 Sep 2017 14:06) (85% - 100%)    Physical Exam:  General: NAD  Cardio: Regular rate and rhythm  Resp: Posterior lung fields with crackles up to mid-lung-field  Abd: Soft, non-tender  Ext: Lower extremities without pitting edema  Neuro: Moving all four extremities  Psych: Coherent, goal-oriented    LABS:                10.1   3.77  >-----------<  225      [09-30-17 @ 05:40]              30.7     137  |  98  |  43  ----------------------------<  246      [09-30-17 @ 05:40]  5.3   |  20  |  3.90        Ca     12.4     [09-30-17 @ 05:40] <- 11.4      Mg     1.9     [09-30-17 @ 05:40]      Phos  6.3     [09-30-17 @ 05:40]    TPro  8.5  /  Alb  3.9  /  TBili  0.4  /  DBili  x   /  AST  12  /  ALT  6   /  AlkPhos  72  [09-30-17 @ 05:40]    Uric acid 7.7      [09-29-17 @ 06:30]    Creatinine Trend:  SCr 3.90 [09-30 @ 05:40]  SCr 3.75 [09-29 @ 06:30]  SCr 4.11 [09-28 @ 06:30]  SCr 4.30 [09-27 @ 06:30]  SCr 4.61 [09-27 @ 00:40]    Uric Acid, Serum: 7.7 mg/dL (09.29.17 @ 06:30)    Serum Pro-Brain Natriuretic Peptide: 29512  (09.29.17 @ 06:30)    CONSIDER ACUTE CONGESTIVE HEART FAILURE if:    AGE                NT-proBNP RESULT  ---                -------------  < 50 YEARS      >  450 pg/mL    Quantiferon - Gold Tuberculosis (09.28.17 @ 06:30)    Quantiferon - Gold Tuberculosis Result: Negative    Culture - Blood (09.28.17 @ 14:44)    Culture - Blood: NO ORGANISMS ISOLATED AT 24 HOURS    Specimen Source: BLOOD VENOUS  Culture - Blood (09.28.17 @ 14:44)    Culture - Blood: NO ORGANISMS ISOLATED AT 24 HOURS    Specimen Source: BLOOD  Culture - Urine: NO GROWTH AT 24 HOURS (09.26.17 @ 19:11)    RADIOLOGY & ADDITIONAL TESTS:  CT Abd/Pelvis W/ PO Contrast 9/29 15:03  "IMPRESSION:   Patchy perihilar opacities and small bilateral pleural effusions which   are new since 9/26/2017 and are consistent with pulmonary edema.  Diffuse bulky lymphadenopathy which is often seen in the setting of   leukemia or lymphoma."    Xray Chest 9/28 19:57  "IMPRESSION:   Bibasilar reticular opacities correspond to known pulmonary fibrosis. No   new focal lung consolidations." [Per primary team and per this author, R costophrenic angle blunting]

## 2017-09-30 NOTE — PROGRESS NOTE ADULT - PROBLEM SELECTOR PLAN 5
Unclear etiology but pt now has two cell lines affected concerning for primary marrow issue  - Hematology c/s: recommending hepatitis panel,   - uric acid elevated  - f/u PTH-rp  - HIV neg,  Unclear etiology but pt now has two cell lines affected concerning for primary marrow issue. uric acid elevated to 7.7, HIV neg,   - Hematology c/s  - f/u hepatitis panel  - f/u PTH-rp Unclear etiology but pt now has two cell lines affected concerning for primary marrow issue. Uric acid elevated to 7.7, HIV neg, .  - Hematology c/s  - f/u hepatitis panel  - f/u PTH-rp

## 2017-09-30 NOTE — PROGRESS NOTE ADULT - SUBJECTIVE AND OBJECTIVE BOX
Patient is a 47y old  Female who presents with a chief complaint of High calcium and my kidney sent by doctor (26 Sep 2017 16:12)        SUBJECTIVE / OVERNIGHT EVENTS:      MEDICATIONS  (STANDING):  insulin lispro (HumaLOG) corrective regimen sliding scale   SubCutaneous three times a day before meals  insulin lispro (HumaLOG) corrective regimen sliding scale   SubCutaneous at bedtime  dextrose 5%. 1000 milliLiter(s) (50 mL/Hr) IV Continuous <Continuous>  dextrose 50% Injectable 12.5 Gram(s) IV Push once  dextrose 50% Injectable 25 Gram(s) IV Push once  dextrose 50% Injectable 25 Gram(s) IV Push once  influenza   Vaccine 0.5 milliLiter(s) IntraMuscular once  ampicillin/sulbactam  IVPB      heparin  Injectable 5000 Unit(s) SubCutaneous every 8 hours  ampicillin/sulbactam  IVPB 1.5 Gram(s) IV Intermittent every 12 hours    MEDICATIONS  (PRN):  dextrose Gel 1 Dose(s) Oral once PRN Blood Glucose LESS THAN 70 milliGRAM(s)/deciliter  glucagon  Injectable 1 milliGRAM(s) IntraMuscular once PRN Glucose LESS THAN 70 milligrams/deciliter  oxyCODONE    5 mG/acetaminophen 325 mG 1 Tablet(s) Oral every 6 hours PRN Severe Pain (7 - 10)  melatonin 3 milliGRAM(s) Oral at bedtime PRN Insomnia        CAPILLARY BLOOD GLUCOSE  203 (30 Sep 2017 08:30)  261 (29 Sep 2017 21:55)  102 (29 Sep 2017 12:23)        I&O's Summary    29 Sep 2017 07:01  -  30 Sep 2017 07:00  --------------------------------------------------------  IN: 0 mL / OUT: 1800 mL / NET: -1800 mL        PHYSICAL EXAM  GENERAL: NAD, well-developed  HEAD:  Atraumatic, Normocephalic  EYES: EOMI, PERRLA, conjunctiva and sclera clear  NECK: Supple, No JVD  CHEST/LUNG: Clear to auscultation bilaterally; No wheeze  HEART: Regular rate and rhythm; No murmurs, rubs, or gallops  ABDOMEN: Soft, Nontender, Nondistended; Bowel sounds present  EXTREMITIES:  2+ Peripheral Pulses, No clubbing, cyanosis, or edema  PSYCH: AAOx3  SKIN: No rashes or lesions    LABS:                        10.1   3.77  )-----------( 225      ( 30 Sep 2017 05:40 )             30.7     09-30    137  |  98  |  43<H>  ----------------------------<  246<H>  5.3   |  20<L>  |  3.90<H>    Ca    12.4<H>      30 Sep 2017 05:40  Phos  6.3     09-30  Mg     1.9     09-30    TPro  8.5<H>  /  Alb  3.9  /  TBili  0.4  /  DBili  x   /  AST  12  /  ALT  6   /  AlkPhos  72  09-30    PT/INR - ( 28 Sep 2017 09:51 )   PT: 12.0 SEC;   INR: 1.07          PTT - ( 28 Sep 2017 09:51 )  PTT:31.9 SEC          RADIOLOGY & ADDITIONAL TESTS:    Imaging Personally Reviewed:  Consultant(s) Notes Reviewed:    Care Discussed with Consultants/Other Providers: Patient is a 47y old  Female who presents with a chief complaint of High calcium and my kidney sent by doctor (26 Sep 2017 16:12)        SUBJECTIVE / OVERNIGHT EVENTS:Pt reports feeling well. She says since her prednisone dose her sugars have been increasing. She still reports non productive cough      MEDICATIONS  (STANDING):  insulin lispro (HumaLOG) corrective regimen sliding scale   SubCutaneous three times a day before meals  insulin lispro (HumaLOG) corrective regimen sliding scale   SubCutaneous at bedtime  dextrose 5%. 1000 milliLiter(s) (50 mL/Hr) IV Continuous <Continuous>  dextrose 50% Injectable 12.5 Gram(s) IV Push once  dextrose 50% Injectable 25 Gram(s) IV Push once  dextrose 50% Injectable 25 Gram(s) IV Push once  influenza   Vaccine 0.5 milliLiter(s) IntraMuscular once  ampicillin/sulbactam  IVPB      heparin  Injectable 5000 Unit(s) SubCutaneous every 8 hours  ampicillin/sulbactam  IVPB 1.5 Gram(s) IV Intermittent every 12 hours    MEDICATIONS  (PRN):  dextrose Gel 1 Dose(s) Oral once PRN Blood Glucose LESS THAN 70 milliGRAM(s)/deciliter  glucagon  Injectable 1 milliGRAM(s) IntraMuscular once PRN Glucose LESS THAN 70 milligrams/deciliter  oxyCODONE    5 mG/acetaminophen 325 mG 1 Tablet(s) Oral every 6 hours PRN Severe Pain (7 - 10)  melatonin 3 milliGRAM(s) Oral at bedtime PRN Insomnia        CAPILLARY BLOOD GLUCOSE  203 (30 Sep 2017 08:30)  261 (29 Sep 2017 21:55)  102 (29 Sep 2017 12:23)        I&O's Summary    29 Sep 2017 07:01  -  30 Sep 2017 07:00  --------------------------------------------------------  IN: 0 mL / OUT: 1800 mL / NET: -1800 mL        PHYSICAL EXAM  GENERAL: NAD, well-developed  HEAD:  Atraumatic, Normocephalic  EYES: EOMI, PERRLA, conjunctiva and sclera clear  NECK: Supple, No JVD  CHEST/LUNG: inspiratory crackles b/l; No wheeze  HEART: Regular rate and rhythm; No murmurs, rubs, or gallops  ABDOMEN: Soft, Nontender, Nondistended; Bowel sounds present  EXTREMITIES:  2+ Peripheral Pulses, No clubbing, cyanosis, or edema,axillary FESTUS  PSYCH: AAOx3  SKIN: No rashes or lesions    LABS:                        10.1   3.77  )-----------( 225      ( 30 Sep 2017 05:40 )             30.7     09-30    137  |  98  |  43<H>  ----------------------------<  246<H>  5.3   |  20<L>  |  3.90<H>    Ca    12.4<H>      30 Sep 2017 05:40  Phos  6.3     09-30  Mg     1.9     09-30    TPro  8.5<H>  /  Alb  3.9  /  TBili  0.4  /  DBili  x   /  AST  12  /  ALT  6   /  AlkPhos  72  09-30    PT/INR - ( 28 Sep 2017 09:51 )   PT: 12.0 SEC;   INR: 1.07          PTT - ( 28 Sep 2017 09:51 )  PTT:31.9 SEC          RADIOLOGY & ADDITIONAL TESTS:    Imaging Personally Reviewed:  Consultant(s) Notes Reviewed:    Care Discussed with Consultants/Other Providers:

## 2017-09-30 NOTE — PROGRESS NOTE ADULT - PROBLEM SELECTOR PLAN 7
Possible 2/2 sarcoid. ANCAs neg no other evidence of vasculitis clinically   -Trend CBC A1C 1 month ago was 7.8; FS trending upward in setting of steroids.  -FS TID   -Diabetic diet  -Hold metformin   -Sliding scale. Will add basal insulin if steroids will

## 2017-09-30 NOTE — PROGRESS NOTE ADULT - PROBLEM SELECTOR PLAN 3
differential includes Jose 2/2 malignancy(myeloma) vs 2/2 hypercalcemia vs NSAID induced nephropathy. Renal Sono showing medical renal disease. SPEP findings c/w chronic inflamation,UPEP showing gamma band, increased kappa and lambda light chains but ratio normal range. Cr down trending on IVF  - dc'd fluids this AM for SOB/crackles  -Immunofixation  -Strict I/OS  -Hold nephrotoxins Differential includes Jose 2/2 malignancy(myeloma) vs 2/2 hypercalcemia vs NSAID induced nephropathy. Renal Sono showing medical renal disease. SPEP findings c/w chronic inflamation,UPEP showing gamma band, increased kappa and lambda light chains but ratio normal range. Cr uptrending off IVF  -f/u Immunofixation  -Strict I/OS  -Hold nephrotoxins Differential includes MIKALA 2/2 malignancy (myeloma) vs 2/2 hypercalcemia vs NSAID-induced nephropathy. Renal Sono showing medical renal disease. SPEP findings c/w chronic inflammation, UPEP showing gamma band, increased kappa and lambda light chains but ratio normal range. Cr uptrending off IVF  -f/u Immunofixation  -Strict I/OS  -Hold nephrotoxins

## 2017-09-30 NOTE — PROGRESS NOTE ADULT - PROBLEM SELECTOR PLAN 2
Pt. with mild hyperkalemia in setting of renal failure. Serum potassium is 5.3 today. Low potassium diet. Monitor serum potassium

## 2017-09-30 NOTE — PROGRESS NOTE ADULT - PROBLEM SELECTOR PLAN 4
Normcytic/microcytic anemia likely 2/2 insufficient marrow production. Reticulocyte count showing no marrow response.   -Monitor CBC daily  -Will consider alternative diagnoses for pts presentation such as lymphoma Stable. s/p 1 dose of Lasix yesterday. W/u for myeloma entirely neg. 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

## 2017-09-30 NOTE — PROGRESS NOTE ADULT - PROBLEM SELECTOR PLAN 8
Pt has been off treatment for over 3 years because she has not followed up with her pulmonologist  Rheum consult appreciated. Pt has hx of biopsy proven sarcoid in 2007 and LN Bx in 2014 showing sarcoid.  -Will obtain Echo given murmur   -Will defer steroids until diagnosis established  -F/u ACE level  -Surgery consult appreciated Pt has been off treatment for over 3 years because she has not followed up with her pulmonologist  Rheum consult appreciated. Pt has hx of biopsy proven sarcoid in 2007 and LN Bx in 2014 showing sarcoid.  -Echo grossly normal  -Will defer steroids until diagnosis established  -F/u ACE level  -Surgery consult appreciated Pt has been off treatment for over 3 years because she has not followed up with her pulmonologist.  Rheum consult appreciated. Pt has hx of biopsy proven sarcoid in 2007 and LN Bx in 2014 showing sarcoid.  -Echo grossly normal  -Will defer steroids until diagnosis established  -F/u core needle biopsy of axillary lymph node  -F/u ACE level  -Surgery consult appreciated

## 2017-09-30 NOTE — PROGRESS NOTE ADULT - PROBLEM SELECTOR PLAN 4
Normcytic/microcytic anemia likely 2/2 insufficient marrow production. Reticulocyte count showing no marrow response.   -Monitor CBC daily  -Will consider alternative diagnoses for pts presentation such as lymphoma,MM  -F/u core needle biopsy of axillary node Normocytic/microcytic anemia likely 2/2 insufficient marrow production. Reticulocyte count showing no marrow response.   -Monitor CBC daily  -Will consider alternative diagnoses for pts presentation such as lymphoma,MM  -F/u core needle biopsy of axillary node

## 2017-09-30 NOTE — PROGRESS NOTE ADULT - PROBLEM SELECTOR PLAN 5
Unclear etiology but pt now has two cell lines affected concerning for primary marrow issue  - Hematology c/s: recommending hepatitis panel, uric acid, PTH-rp and CT A/P (HIV -, ) Normcytic/microcytic anemia likely 2/2 insufficient marrow production. Reticulocyte count showing no marrow response.   -Monitor CBC daily  -Will consider alternative diagnoses for pts presentation such as lymphoma

## 2017-09-30 NOTE — PROGRESS NOTE ADULT - PROBLEM SELECTOR PLAN 1
SOB overnight with desat to 85%. Differential includes volume overload vs pna vs PE vs progression of sarcoid. Pt s/p 1 dose lasix with 1.8L output but no improvement in oxygenation    - Wells score 3, moderate risk group but patient can not tolerate V/Q due to SOB and has MIKALA preventing CTA, will continue to monitor   - Pulm consult appreciated  -Will obtain ABG today   -c/w Unasyn and vanc Pt s/p 1 dose lasix with 1.8L output and pt no longer needs supplemental O2. She denies dyspnea at this time    - Pulm consult appreciated  -c/w Unasyn and vanc for presumed PNa  -Would dose Lasix 40 IV X 1 given inspiratory crackles on exam.   -Will ambulate patient to see if she desaturates on RA. Pt s/p 1 dose lasix with 1.8L output and pt no longer needs supplemental O2. She denies dyspnea at this time    - Pulm consult appreciated  -c/w Unasyn and bruce for presumed pna  -Will ambulate patient to see if she desaturates on RA.

## 2017-09-30 NOTE — PROGRESS NOTE ADULT - SUBJECTIVE AND OBJECTIVE BOX
LEONEL BEVERLY  47y  Female      Patient is a 47y old  Female who presents with a chief complaint of High calcium and my kidney sent by doctor (26 Sep 2017 16:12)      INTERVAL HPI/OVERNIGHT EVENTS:    REVIEW OF SYSTEMS:  CONSTITUTIONAL: No fever, weight loss, or fatigue  EYES: No eye pain, visual disturbances, or discharge  ENMT:  No difficulty hearing, tinnitus, vertigo; No sinus or throat pain  NECK: No pain or stiffness  RESPIRATORY: No cough, wheezing, chills or hemoptysis; No shortness of breath  CARDIOVASCULAR: No chest pain, palpitations, dizziness, or leg swelling  GASTROINTESTINAL: No abdominal or epigastric pain. No diarrhea or constipation. No nausea, vomiting, or hematemesis. No melena or hematochezia.  GENITOURINARY: No dysuria, frequency, hematuria, or incontinence  NEUROLOGICAL: No headaches, memory loss, loss of strength, numbness, or tremors  MUSCULOSKELETAL: No joint pain or swelling; No muscle, back, or extremity pain  SKIN: No itching, burning, rashes, or lesions   LYMPH NODES: No enlarged glands  ENDOCRINE: No heat or cold intolerance; No hair loss  PSYCHIATRIC: No depression, anxiety, mood swings, or difficulty sleeping  HEME/LYMPH: No easy bruising, or bleeding gums  ALLERY AND IMMUNOLOGIC: No hives or eczema    T(C): 36.4 (09-30-17 @ 05:29), Max: 39 (09-29-17 @ 19:31)  HR: 61 (09-30-17 @ 05:29) (61 - 125)  BP: 133/79 (09-30-17 @ 05:29) (133/79 - 165/98)  RR: 16 (09-30-17 @ 05:29) (16 - 19)  SpO2: 100% (09-30-17 @ 05:29) (95% - 100%)  Wt(kg): --Vital Signs Last 24 Hrs  T(C): 36.4 (30 Sep 2017 05:29), Max: 39 (29 Sep 2017 19:31)  T(F): 97.5 (30 Sep 2017 05:29), Max: 102.2 (29 Sep 2017 19:31)  HR: 61 (30 Sep 2017 05:29) (61 - 125)  BP: 133/79 (30 Sep 2017 05:29) (133/79 - 165/98)  BP(mean): --  RR: 16 (30 Sep 2017 05:29) (16 - 19)  SpO2: 100% (30 Sep 2017 05:29) (95% - 100%)  Wt(kg): --    PHYSICAL EXAM:  GENERAL: NAD, well-groomed, well-developed  HEAD:  Atraumatic, Normocephalic  EYES: EOMI, PERRLA, conjunctiva and sclera clear  ENMT: Moist mucous membranes, Good dentition, No lesions  NECK: Supple, No JVD, Normal thyroid  NERVOUS SYSTEM:  Alert & Oriented X3, Good concentration; Motor Strength 5/5 B/L upper and lower extremities; DTRs 2+ intact and symmetric  CHEST/LUNG: Clear to auscultation bilaterally; No rales, rhonchi, wheezing, or rubs  HEART: Regular rate and rhythm; No murmurs, rubs, or gallops  ABDOMEN: Soft, Nontender, Nondistended; Bowel sounds present  EXTREMITIES:  2+ Peripheral Pulses, No clubbing, cyanosis, or edema  LYMPH: No lymphadenopathy noted  SKIN: No rashes or lesions    Consultant(s) Notes Reviewed:  [x ] YES  [ ] NO  Care Discussed with Consultants/Other Providers [ x] YES  [ ] NO    LABS:                        9.2    5.04  )-----------( 187      ( 29 Sep 2017 06:30 )             27.8     09-29    139  |  105  |  38<H>  ----------------------------<  84  4.7   |  16<L>  |  3.75<H>    Ca    11.7<H>      29 Sep 2017 06:30  Phos  4.4     09-29  Mg     1.8     09-29    TPro  7.0  /  Alb  3.2<L>  /  TBili  0.3  /  DBili  x   /  AST  12  /  ALT  6   /  AlkPhos  54  09-29    PT/INR - ( 28 Sep 2017 09:51 )   PT: 12.0 SEC;   INR: 1.07          PTT - ( 28 Sep 2017 09:51 )  PTT:31.9 SEC    CAPILLARY BLOOD GLUCOSE  261 (29 Sep 2017 21:55)  102 (29 Sep 2017 12:23)                RADIOLOGY & ADDITIONAL TESTS:    Imaging Personally Reviewed:  [ ] YES  [ ] NO LEONEL BEVERLY  47y  Female      Patient is a 47y old  Female who presents with a chief complaint of High calcium and my kidney sent by doctor (26 Sep 2017 16:12)      INTERVAL HPI/OVERNIGHT EVENTS:    Pt denies dyspnea or hemoptysis for 24 hours. However, spiked a fever to 102.2F at 730p last night. Came down to less than 98F three hours later. Patient endorses sweating through clothes at that time, but notes that she felt better by the time it took to change her gown and sheets. Pt denies headache, which she attributes to sleeping well through the night. Denies CP, SOB, abd pain, or pain in lower extremities.     REVIEW OF SYSTEMS:  CONSTITUTIONAL: +fever, No fatigue  EYES: No eye pain, visual disturbances, or discharge  ENMT: No difficulty hearing, tinnitus, vertigo; No sinus or throat pain  NECK: No pain or stiffness  RESPIRATORY: No cough, wheezing, or hemoptysis; No shortness of breath  CARDIOVASCULAR: No chest pain, palpitations, dizziness, or leg swelling  GASTROINTESTINAL: No abdominal or epigastric pain. No diarrhea or constipation. No nausea, vomiting, or hematemesis. No melena or hematochezia.  GENITOURINARY: No dysuria, frequency, hematuria, or incontinence  NEUROLOGICAL: No headaches, memory loss, loss of strength, numbness, or tremors  MUSCULOSKELETAL: No joint pain or swelling; No muscle, back, or extremity pain  SKIN: No itching, burning, rashes, or lesions   LYMPH NODES: + axillary, supraclavicular, submandibular, anterior and posterior cervical  ENDOCRINE: No heat or cold intolerance; No hair loss  PSYCHIATRIC: No depression, anxiety, mood swings, or difficulty sleeping  HEME/LYMPH: No easy bruising, or bleeding gums  ALLERY AND IMMUNOLOGIC: No hives or eczema    T(C): 36.4 (09-30-17 @ 05:29), Max: 39 (09-29-17 @ 19:31)  HR: 61 (09-30-17 @ 05:29) (61 - 125)  BP: 133/79 (09-30-17 @ 05:29) (133/79 - 165/98)  RR: 16 (09-30-17 @ 05:29) (16 - 19)  SpO2: 100% (09-30-17 @ 05:29) (95% - 100%)  Wt(kg): --Vital Signs Last 24 Hrs  T(C): 36.4 (30 Sep 2017 05:29), Max: 39 (29 Sep 2017 19:31)  T(F): 97.5 (30 Sep 2017 05:29), Max: 102.2 (29 Sep 2017 19:31)  HR: 61 (30 Sep 2017 05:29) (61 - 125)  BP: 133/79 (30 Sep 2017 05:29) (133/79 - 165/98)  BP(mean): --  RR: 16 (30 Sep 2017 05:29) (16 - 19)  SpO2: 100% (30 Sep 2017 05:29) (95% - 100%)  Wt(kg): --    PHYSICAL EXAM:  GENERAL: NAD, well-groomed, well-developed  HEAD:  Atraumatic, Normocephalic  EYES: EOMI, PERRLA, conjunctiva and sclera clear  ENMT: Moist mucous membranes, Good dentition, No lesions  NECK: Supple, No JVD, Normal thyroid  NERVOUS SYSTEM:  Alert & Oriented X3, Good concentration; Motor Strength 5/5 B/L upper and lower extremities; DTRs 2+ intact and symmetric  CHEST/LUNG: No crackles, bilateral decreased breath sounds; No rales, rhonchi, wheezing, or rubs  HEART: Regular rate and rhythm; No murmurs, rubs, or gallops  ABDOMEN: Soft, Nontender, Nondistended; Bowel sounds present  EXTREMITIES:  2+ Peripheral Pulses, No clubbing, cyanosis, or edema  LYMPH: LAD - axillary, supraclavicular, submandibular, anterior and posterior cervical   SKIN: No rashes or lesions    Consultant(s) Notes Reviewed:  [x ] YES  [ ] NO    LABS:                             10.1<L>  3.77<L>  )-----------( 225      ( 30 Sep 2017 05:40 )                  30.7<L>    MCV: 77.7<L>    09-29    139  |  105  |  38<H>  ----------------------------<  84  4.7   |  16<L>  |  3.75<H>    Ca    11.7<H>      29 Sep 2017 06:30  Phos  4.4     09-29  Mg     1.8     09-29    TPro  7.0  /  Alb  3.2<L>  /  TBili  0.3  /  DBili  x   /  AST  12  /  ALT  6   /  AlkPhos  54  09-29    PT/INR - ( 28 Sep 2017 09:51 )   PT: 12.0 SEC;   INR: 1.07          PTT - ( 28 Sep 2017 09:51 )  PTT:31.9 SEC    CAPILLARY BLOOD GLUCOSE  261 (29 Sep 2017 21:55)  102 (29 Sep 2017 12:23)                RADIOLOGY & ADDITIONAL TESTS:    CT abd/pelvis 9/29/17:  Patchy perihilar opacities and small bilateral pleural effusions which are  new since 9/26/2017 and are consistent with pulmonary edema.  Diffuse bulky lymphadenopathy which is often seen in the setting of leukemia  or lymphoma.    TTE 9/29/17: grossly normal LEONEL BEVERLY  47y  Female      Patient is a 47y old  Female who presents with a chief complaint of High calcium and my kidney sent by doctor (26 Sep 2017 16:12)      INTERVAL HPI/OVERNIGHT EVENTS:    Pt denies dyspnea or hemoptysis for 24 hours. However, spiked a fever to 102.2F at 730p last night. Came down to less than 98F three hours later. Patient endorses sweating through clothes at that time, but notes that she felt better by the time it took to change her gown and sheets. Pt denies headache, which she attributes to sleeping well through the night. Denies CP, SOB, abd pain, or pain in lower extremities. On NC overnight, off supplemental oxygen as of 9am.    REVIEW OF SYSTEMS:  CONSTITUTIONAL: +fever, No fatigue  EYES: No eye pain, visual disturbances, or discharge  ENMT: No difficulty hearing, tinnitus, vertigo; No sinus or throat pain  NECK: No pain or stiffness  RESPIRATORY: No cough, wheezing, or hemoptysis; No shortness of breath  CARDIOVASCULAR: No chest pain, palpitations, dizziness, or leg swelling  GASTROINTESTINAL: No abdominal or epigastric pain. No diarrhea or constipation. No nausea, vomiting, or hematemesis. No melena or hematochezia.  GENITOURINARY: No dysuria, frequency, hematuria, or incontinence  NEUROLOGICAL: No headaches, memory loss, loss of strength, numbness, or tremors  MUSCULOSKELETAL: No joint pain or swelling; No muscle, back, or extremity pain  SKIN: No itching, burning, rashes, or lesions   LYMPH NODES: + axillary, supraclavicular, submandibular, anterior and posterior cervical  ENDOCRINE: No heat or cold intolerance; No hair loss  PSYCHIATRIC: No depression, anxiety, mood swings, or difficulty sleeping  HEME/LYMPH: No easy bruising, or bleeding gums  ALLERY AND IMMUNOLOGIC: No hives or eczema    T(C): 36.4 (09-30-17 @ 05:29), Max: 39 (09-29-17 @ 19:31)  HR: 61 (09-30-17 @ 05:29) (61 - 125)  BP: 133/79 (09-30-17 @ 05:29) (133/79 - 165/98)  RR: 16 (09-30-17 @ 05:29) (16 - 19)  SpO2: 100% (09-30-17 @ 05:29) (95% - 100%)  Wt(kg): --Vital Signs Last 24 Hrs  T(C): 36.4 (30 Sep 2017 05:29), Max: 39 (29 Sep 2017 19:31)  T(F): 97.5 (30 Sep 2017 05:29), Max: 102.2 (29 Sep 2017 19:31)  HR: 61 (30 Sep 2017 05:29) (61 - 125)  BP: 133/79 (30 Sep 2017 05:29) (133/79 - 165/98)  BP(mean): --  RR: 16 (30 Sep 2017 05:29) (16 - 19)  SpO2: 100% (30 Sep 2017 05:29) (95% - 100%)  Wt(kg): --    PHYSICAL EXAM:  GENERAL: NAD, well-groomed, well-developed  HEAD:  Atraumatic, Normocephalic  EYES: EOMI, PERRLA, conjunctiva and sclera clear  ENMT: Moist mucous membranes, Good dentition, No lesions  NECK: Supple, No JVD, Normal thyroid  NERVOUS SYSTEM:  Alert & Oriented X3, Good concentration; Motor Strength 5/5 B/L upper and lower extremities; DTRs 2+ intact and symmetric  CHEST/LUNG: Mild inspiratory crackles, bilateral decreased breath sounds; No rhonchi, wheezing, or rubs  HEART: Regular rate and rhythm; Soft systolic murmur. No rubs, or gallops  ABDOMEN: Soft, Nontender, Nondistended; Bowel sounds present  EXTREMITIES:  2+ Peripheral Pulses, No clubbing, cyanosis, or edema  LYMPH: LAD - axillary, supraclavicular, submandibular, anterior and posterior cervical   SKIN: No rashes or lesions    Consultant(s) Notes Reviewed:  [x ] YES  [ ] NO    LABS:                             10.1<L>  3.77<L>  )-----------( 225      ( 30 Sep 2017 05:40 )                  30.7<L>    MCV: 77.7<L>    09-30    137  |  98        |  43<H>  ----------------------------<  246<H>  5.3   |  20<L>  |  3.90<H>    Ca    12.4<H>      30 Sep 2017 05:40  Phos  6.3<H>     09-30  Mg     1.9           09-30    TPro  7.0  /  Alb  3.2<L>  /  TBili  0.3  /  DBili  x   /  AST  12  /  ALT  6   /  AlkPhos  54  09-29    PT/INR - ( 28 Sep 2017 09:51 )   PT: 12.0 SEC;   INR: 1.07          PTT - ( 28 Sep 2017 09:51 )  PTT:31.9 SEC    CAPILLARY BLOOD GLUCOSE  261 (29 Sep 2017 21:55)  102 (29 Sep 2017 12:23)                RADIOLOGY & ADDITIONAL TESTS:    CT abd/pelvis 9/29/17:  Patchy perihilar opacities and small bilateral pleural effusions which are  new since 9/26/2017 and are consistent with pulmonary edema.  Diffuse bulky lymphadenopathy which is often seen in the setting of leukemia  or lymphoma.    TTE 9/29/17: grossly normal

## 2017-09-30 NOTE — PROGRESS NOTE ADULT - PROBLEM SELECTOR PLAN 1
SOB improved since yesterday. No dyspnea or hemoptysis endorsed for 24 hours.  - Most likely 2/2 volume overload, improved with d/c IVF and lasix  - Pulm c/s for dyspnea and recommendations regarding sarcoid SOB improved since yesterday. No dyspnea or hemoptysis endorsed for 24 hours.  - Most likely 2/2 volume overload, improved with d/c IVF and lasix  - Due to PNA concern, started on Unasyn and one dose of vanc  - Pulm c/s for dyspnea and recommendations regarding sarcoid

## 2017-09-30 NOTE — PROGRESS NOTE ADULT - PROBLEM SELECTOR PLAN 1
Pt. with most likely hemodynamically mediated MIKALA in setting of hypercalcemia and recent NSAID use. Scr increased to 3.90 today from 3.75 yesterday. Patient reports voiding well. IVF discontinued as pt. developed SOB.  Monitor labs and urine output. Avoid nephrotoxins, NSAIDs, and RCA Pt. with most likely hemodynamically mediated MIKALA in setting of hypercalcemia and recent NSAID use. Scr increased to 3.90 today from 3.75 yesterday. Patient reports voiding well. IVF discontinued as pt. developed SOB. Monitor labs and urine output. Avoid nephrotoxins, NSAIDs, and RCA

## 2017-10-01 ENCOUNTER — OUTPATIENT (OUTPATIENT)
Dept: OUTPATIENT SERVICES | Facility: HOSPITAL | Age: 47
LOS: 1 days | End: 2017-10-01
Payer: MEDICAID

## 2017-10-01 LAB
BASOPHILS # BLD AUTO: 0.02 K/UL — SIGNIFICANT CHANGE UP (ref 0–0.2)
BASOPHILS NFR BLD AUTO: 0.5 % — SIGNIFICANT CHANGE UP (ref 0–2)
BUN SERPL-MCNC: 55 MG/DL — HIGH (ref 7–23)
CALCIUM SERPL-MCNC: 11.7 MG/DL — HIGH (ref 8.4–10.5)
CHLORIDE SERPL-SCNC: 97 MMOL/L — LOW (ref 98–107)
CO2 SERPL-SCNC: 24 MMOL/L — SIGNIFICANT CHANGE UP (ref 22–31)
CREAT SERPL-MCNC: 3.59 MG/DL — HIGH (ref 0.5–1.3)
EOSINOPHIL # BLD AUTO: 0.47 K/UL — SIGNIFICANT CHANGE UP (ref 0–0.5)
EOSINOPHIL NFR BLD AUTO: 11.5 % — HIGH (ref 0–6)
GLUCOSE SERPL-MCNC: 163 MG/DL — HIGH (ref 70–99)
HAV IGM SER-ACNC: NONREACTIVE — SIGNIFICANT CHANGE UP
HBV CORE IGM SER-ACNC: NONREACTIVE — SIGNIFICANT CHANGE UP
HBV SURFACE AG SER-ACNC: NONREACTIVE — SIGNIFICANT CHANGE UP
HCT VFR BLD CALC: 26.7 % — LOW (ref 34.5–45)
HCV AB S/CO SERPL IA: 0.13 S/CO — SIGNIFICANT CHANGE UP
HCV AB SERPL-IMP: SIGNIFICANT CHANGE UP
HGB BLD-MCNC: 8.9 G/DL — LOW (ref 11.5–15.5)
IMM GRANULOCYTES # BLD AUTO: 0.05 # — SIGNIFICANT CHANGE UP
IMM GRANULOCYTES NFR BLD AUTO: 1.2 % — SIGNIFICANT CHANGE UP (ref 0–1.5)
LYMPHOCYTES # BLD AUTO: 0.7 K/UL — LOW (ref 1–3.3)
LYMPHOCYTES # BLD AUTO: 17.1 % — SIGNIFICANT CHANGE UP (ref 13–44)
MAGNESIUM SERPL-MCNC: 1.9 MG/DL — SIGNIFICANT CHANGE UP (ref 1.6–2.6)
MCHC RBC-ENTMCNC: 25.9 PG — LOW (ref 27–34)
MCHC RBC-ENTMCNC: 33.3 % — SIGNIFICANT CHANGE UP (ref 32–36)
MCV RBC AUTO: 77.6 FL — LOW (ref 80–100)
MONOCYTES # BLD AUTO: 0.54 K/UL — SIGNIFICANT CHANGE UP (ref 0–0.9)
MONOCYTES NFR BLD AUTO: 13.2 % — SIGNIFICANT CHANGE UP (ref 2–14)
NEUTROPHILS # BLD AUTO: 2.32 K/UL — SIGNIFICANT CHANGE UP (ref 1.8–7.4)
NEUTROPHILS NFR BLD AUTO: 56.5 % — SIGNIFICANT CHANGE UP (ref 43–77)
NRBC # FLD: 0 — SIGNIFICANT CHANGE UP
PHOSPHATE SERPL-MCNC: 4.7 MG/DL — HIGH (ref 2.5–4.5)
PLATELET # BLD AUTO: 226 K/UL — SIGNIFICANT CHANGE UP (ref 150–400)
PMV BLD: 9.5 FL — SIGNIFICANT CHANGE UP (ref 7–13)
POTASSIUM SERPL-MCNC: 3.6 MMOL/L — SIGNIFICANT CHANGE UP (ref 3.5–5.3)
POTASSIUM SERPL-SCNC: 3.6 MMOL/L — SIGNIFICANT CHANGE UP (ref 3.5–5.3)
PTH RELATED PROT SERPL-MCNC: 2.9 ZZ — SIGNIFICANT CHANGE UP
RBC # BLD: 3.44 M/UL — LOW (ref 3.8–5.2)
RBC # FLD: 12.9 % — SIGNIFICANT CHANGE UP (ref 10.3–14.5)
SODIUM SERPL-SCNC: 138 MMOL/L — SIGNIFICANT CHANGE UP (ref 135–145)
WBC # BLD: 4.1 K/UL — SIGNIFICANT CHANGE UP (ref 3.8–10.5)
WBC # FLD AUTO: 4.1 K/UL — SIGNIFICANT CHANGE UP (ref 3.8–10.5)

## 2017-10-01 PROCEDURE — 99232 SBSQ HOSP IP/OBS MODERATE 35: CPT | Mod: GC

## 2017-10-01 RX ORDER — VANCOMYCIN HCL 1 G
500 VIAL (EA) INTRAVENOUS EVERY 24 HOURS
Qty: 0 | Refills: 0 | Status: DISCONTINUED | OUTPATIENT
Start: 2017-10-01 | End: 2017-10-03

## 2017-10-01 RX ORDER — PANTOPRAZOLE SODIUM 20 MG/1
40 TABLET, DELAYED RELEASE ORAL
Qty: 0 | Refills: 0 | Status: DISCONTINUED | OUTPATIENT
Start: 2017-10-01 | End: 2017-10-03

## 2017-10-01 RX ORDER — ACETAMINOPHEN 500 MG
650 TABLET ORAL ONCE
Qty: 0 | Refills: 0 | Status: COMPLETED | OUTPATIENT
Start: 2017-10-01 | End: 2017-10-01

## 2017-10-01 RX ADMIN — PANTOPRAZOLE SODIUM 40 MILLIGRAM(S): 20 TABLET, DELAYED RELEASE ORAL at 08:26

## 2017-10-01 RX ADMIN — Medication 1: at 17:58

## 2017-10-01 RX ADMIN — AMPICILLIN SODIUM AND SULBACTAM SODIUM 100 GRAM(S): 250; 125 INJECTION, POWDER, FOR SUSPENSION INTRAMUSCULAR; INTRAVENOUS at 17:59

## 2017-10-01 RX ADMIN — Medication 650 MILLIGRAM(S): at 18:57

## 2017-10-01 RX ADMIN — Medication 100 MILLIGRAM(S): at 14:33

## 2017-10-01 RX ADMIN — AMPICILLIN SODIUM AND SULBACTAM SODIUM 100 GRAM(S): 250; 125 INJECTION, POWDER, FOR SUSPENSION INTRAMUSCULAR; INTRAVENOUS at 05:49

## 2017-10-01 NOTE — PROGRESS NOTE ADULT - PROBLEM SELECTOR PLAN 8
Possible 2/2 sarcoid. ANCAs neg no other evidence of vasculitis clinically. Low suspicion for parasitic infection vs persistent allergen    -Trend CBC

## 2017-10-01 NOTE — PROGRESS NOTE ADULT - PROBLEM SELECTOR PLAN 9
Pt has been off treatment for over 3 years because she has not followed up with her pulmonologist  Rheum consult appreciated. Pt has hx of biopsy proven sarcoid in 2007 and LN Bx in 2014 showing sarcoid. EF 65% no evidence of sarcoid on TTE thought MRI would be most sensitive test.  -Will touch base with rheumatology regarding steroids   -F/u ACE level Pt has been off treatment for over 3 years because she has not followed up with her pulmonologist  Rheum consult appreciated. Pt has hx of biopsy proven sarcoid in 2007 and LN Bx in 2014 showing sarcoid. EF 65% no evidence of sarcoid on TTE thought MRI would be most sensitive test.  -As per rheum, hold steroids until LN biopsy results.  -F/u ACE level

## 2017-10-01 NOTE — PROGRESS NOTE ADULT - PROBLEM SELECTOR PLAN 1
Pt s/p 1 dose lasix with 1.8L output and pt no longer needs supplemental O2. She denies dyspnea at this time    - Pulm consult appreciated  -c/w Unasyn and bruce for presumed pna  -Will ambulate patient to see if she desaturates on RA. Improved with lasix and IVL. Pt no longer needs supplemental O2. She denies dyspnea at this time    - Pulm consult appreciated. Hold steroids until LN biopsy results.  -c/w Unasyn and vanc for presumed pna Improved with lasix and IVL. Pt no longer needs supplemental O2. She denies dyspnea at this time    - Pulm consult appreciated. Hold steroids until LN biopsy results.  -c/w Unasyn and vanc for presumed PNA

## 2017-10-01 NOTE — PROGRESS NOTE ADULT - PROBLEM SELECTOR PLAN 3
Pt. with hypercalcemia in setting of sarcoidosis/lymphadenopathy. Serum calcium elevated at 11.7  today. IV fluids were discontinued. Pt. received a dose of oral prednisone on 9/29. Monitor serum calcium

## 2017-10-01 NOTE — PROGRESS NOTE ADULT - PROBLEM SELECTOR PLAN 7
A1C 1 month ago was 7.8; FS trending upward in setting of steroids. Pt has elevated sugars after 1 dose of prednisone. s/p 5 units   -FS TID   -Diabetic diet  -Hold metformin   -Sliding scale. Will add basal insulin if steroids will be continuous factor

## 2017-10-01 NOTE — PROGRESS NOTE ADULT - PROBLEM SELECTOR PLAN 5
Normcytic/microcytic anemia likely 2/2 insufficient marrow production. Reticulocyte count showing no marrow response.   -Monitor CBC daily  -Will consider alternative diagnoses for pts presentation such as lymphoma

## 2017-10-01 NOTE — PROGRESS NOTE ADULT - PROBLEM SELECTOR PLAN 7
A1C 1 month ago was 7.8; FS trending upward in setting of steroids.  -FS TID   -Diabetic diet  -Hold metformin   -Sliding scale. Will add basal insulin if steroids will A1C 1 month ago was 7.8; FS trending upward in setting of steroids. 3 units over the last 24 hours.  -FS TID   -Diabetic diet  -Hold metformin   -Sliding scale. Will consider basal insulin A1C 1 month ago was 7.8; FS trending upward in setting of steroids. 5 units over the last 24 hours.  -FS TID   -Diabetic diet  -Hold metformin   -Sliding scale. Will consider basal insulin

## 2017-10-01 NOTE — PROGRESS NOTE ADULT - PROBLEM SELECTOR PLAN 5
Normcytic/microcytic anemia likely 2/2 insufficient marrow production. Reticulocyte count showing no marrow response.   -Monitor CBC daily  -F/u Flow cytometry. Will touch base with heme  -Will consider alternative diagnoses for pts presentation such as lymphoma

## 2017-10-01 NOTE — PROGRESS NOTE ADULT - SUBJECTIVE AND OBJECTIVE BOX
[facility] Division of Kidney Diseases & Hypertension  FOLLOW UP NOTE  [phone]--------------------------------------------------------------------------------  HPI: 47-year-old female with known history of sarcoidosis, DM presented to [facility] with complaints of weakness, back pain and LE pain. Pt. found to have MIKALA and hypercalcemia. Pt. received IVF with improvement in serum creatinine and calcium levels. Pt. seen and examined at bedside. Pt. denies chest pain, LE edema.  Pt. s/p left axillary LN biopsy on 9/28/17.    PAST HISTORY  --------------------------------------------------------------------------------  No significant changes to PMH, PSH, FHx, SHx, unless otherwise noted    ALLERGIES & MEDICATIONS  --------------------------------------------------------------------------------  Allergies    No Known Allergies    Intolerances      Standing Inpatient Medications  insulin lispro (HumaLOG) corrective regimen sliding scale   SubCutaneous three times a day before meals  insulin lispro (HumaLOG) corrective regimen sliding scale   SubCutaneous at bedtime  dextrose 5%. 1000 milliLiter(s) IV Continuous <Continuous>  dextrose 50% Injectable 12.5 Gram(s) IV Push once  dextrose 50% Injectable 25 Gram(s) IV Push once  dextrose 50% Injectable 25 Gram(s) IV Push once  influenza   Vaccine 0.5 milliLiter(s) IntraMuscular once  ampicillin/sulbactam  IVPB      heparin  Injectable 5000 Unit(s) SubCutaneous every 8 hours  ampicillin/sulbactam  IVPB 1.5 Gram(s) IV Intermittent every 12 hours  pantoprazole    Tablet 40 milliGRAM(s) Oral before breakfast  vancomycin  IVPB 500 milliGRAM(s) IV Intermittent every 24 hours    PRN Inpatient Medications  dextrose Gel 1 Dose(s) Oral once PRN  glucagon  Injectable 1 milliGRAM(s) IntraMuscular once PRN  oxyCODONE    5 mG/acetaminophen 325 mG 1 Tablet(s) Oral every 6 hours PRN  melatonin 3 milliGRAM(s) Oral at bedtime PRN      REVIEW OF SYSTEMS  --------------------------------------------------------------------------------  CVS: no chest pain  RESP: + improved SOB, + cough  ABD: no abdominal pain  : no dysuria  MSK: no edema     All other systems were reviewed and are negative, except as noted.    VITALS/PHYSICAL EXAM  --------------------------------------------------------------------------------  T(C): 36.6 (10-01-17 @ 05:49), Max: 36.7 (09-30-17 @ 20:10)  HR: 82 (10-01-17 @ 05:49) (82 - 89)  BP: 127/64 (10-01-17 @ 05:49) (121/68 - 141/80)  RR: 16 (10-01-17 @ 05:49) (16 - 18)  SpO2: 98% (10-01-17 @ 05:49) (96% - 100%)  Wt(kg): --        Physical Exam:  	Gen: NAD  	Pulm: bilateral crackles heard   	CV: S1S2  	Abd: Soft, +BS   	Ext: No LE edema B/L  	Neuro: Awake, alert  	Skin: Warm and dry    LABS/STUDIES  --------------------------------------------------------------------------------              8.9    4.10  >-----------<  226      [10-01-17 @ 06:15]              26.7     138  |  97  |  55  ----------------------------<  163      [10-01-17 @ 06:10]  3.6   |  24  |  3.59        Ca     11.7     [10-01-17 @ 06:10]      Mg     1.9     [10-01-17 @ 06:10]      Phos  4.7     [10-01-17 @ 06:10]    TPro  8.5  /  Alb  3.9  /  TBili  0.4  /  DBili  x   /  AST  12  /  ALT  6   /  AlkPhos  72  [09-30-17 @ 05:40]          Creatinine Trend:  SCr 3.59 [10-01 @ 06:10]  SCr 3.90 [09-30 @ 05:40]  SCr 3.75 [09-29 @ 06:30]  SCr 4.11 [09-28 @ 06:30]  SCr 4.30 [09-27 @ 06:30]    Urinalysis - [09-26-17 @ 15:02]      Color PLYEL / Appearance CLEAR / SG 1.010 / pH 6.5      Gluc 50 / Ketone NEGATIVE  / Bili NEGATIVE / Urobili NORMAL       Blood NEGATIVE / Protein 30 / Leuk Est NEGATIVE / Nitrite NEGATIVE      RBC 0-2 / WBC 0-2 / Hyaline  / Gran  / Sq Epi OCC / Non Sq Epi  / Bacteria     Urine Creatinine 35.32      [09-26-17 @ 08:34]  Urine Protein 22.7      [09-26-17 @ 09:21]  Urine Sodium 102      [09-26-17 @ 08:34]    Iron 36, TIBC 202, %sat --      [09-27-17 @ 06:30]  Ferritin 112.8      [09-27-17 @ 06:30]  PTH 8.12 (Ca --)      [09-26-17 @ 07:35]   --  Vitamin D (25OH) 12.3      [09-26-17 @ 07:35]  HbA1c 6.9      [09-27-17 @ 06:30]    HCV 0.13, --      [09-30-17 @ 05:40]    ANCA: cANCA Negative, pANCA Negative, atypical ANCA --      [09-26-17 @ 09:09]  PR3-ANCA <5.0, interpretation: --      [09-26-17 @ 09:09]  Free Light Chains: kappa 17.40, lambda 8.98, ratio = 1.94      [09-26 @ 09:09] [facility] Division of Kidney Diseases & Hypertension  FOLLOW UP NOTE  [phone]--------------------------------------------------------------------------------    HPI: 47-year-old female with known history of sarcoidosis, DM presented to [facility] with complaints of weakness, back pain and LE pain. Pt. found to have MIKALA and hypercalcemia. Pt. received IVF with improvement in serum creatinine and calcium levels. Pt. seen and examined at bedside. Pt. feels better and denies chest pain, LE edema.  Pt. s/p left axillary LN biopsy on 9/28/17.    PAST HISTORY  --------------------------------------------------------------------------------  No significant changes to PMH, PSH, FHx, SHx, unless otherwise noted    ALLERGIES & MEDICATIONS  --------------------------------------------------------------------------------  Allergies    No Known Allergies    Intolerances      Standing Inpatient Medications  insulin lispro (HumaLOG) corrective regimen sliding scale   SubCutaneous three times a day before meals  insulin lispro (HumaLOG) corrective regimen sliding scale   SubCutaneous at bedtime  dextrose 5%. 1000 milliLiter(s) IV Continuous <Continuous>  dextrose 50% Injectable 12.5 Gram(s) IV Push once  dextrose 50% Injectable 25 Gram(s) IV Push once  dextrose 50% Injectable 25 Gram(s) IV Push once  influenza   Vaccine 0.5 milliLiter(s) IntraMuscular once  ampicillin/sulbactam  IVPB      heparin  Injectable 5000 Unit(s) SubCutaneous every 8 hours  ampicillin/sulbactam  IVPB 1.5 Gram(s) IV Intermittent every 12 hours  pantoprazole    Tablet 40 milliGRAM(s) Oral before breakfast  vancomycin  IVPB 500 milliGRAM(s) IV Intermittent every 24 hours    PRN Inpatient Medications  dextrose Gel 1 Dose(s) Oral once PRN  glucagon  Injectable 1 milliGRAM(s) IntraMuscular once PRN  oxyCODONE    5 mG/acetaminophen 325 mG 1 Tablet(s) Oral every 6 hours PRN  melatonin 3 milliGRAM(s) Oral at bedtime PRN      REVIEW OF SYSTEMS  --------------------------------------------------------------------------------  CVS: no chest pain  RESP: + improved SOB, + cough  ABD: no abdominal pain  : no dysuria  MSK: no edema     All other systems were reviewed and are negative, except as noted.    VITALS/PHYSICAL EXAM  --------------------------------------------------------------------------------  T(C): 36.6 (10-01-17 @ 05:49), Max: 36.7 (09-30-17 @ 20:10)  HR: 82 (10-01-17 @ 05:49) (82 - 89)  BP: 127/64 (10-01-17 @ 05:49) (121/68 - 141/80)  RR: 16 (10-01-17 @ 05:49) (16 - 18)  SpO2: 98% (10-01-17 @ 05:49) (96% - 100%)  Wt(kg): --    Physical Exam:  	Gen: NAD  	Pulm: bilateral crackles heard   	CV: S1S2  	Abd: Soft, +BS   	Ext: No LE edema B/L  	Neuro: Awake, alert  	Skin: Warm and dry    LABS/STUDIES  --------------------------------------------------------------------------------              8.9    4.10  >-----------<  226      [10-01-17 @ 06:15]              26.7     138  |  97  |  55  ----------------------------<  163      [10-01-17 @ 06:10]  3.6   |  24  |  3.59        Ca     11.7     [10-01-17 @ 06:10]      Mg     1.9     [10-01-17 @ 06:10]      Phos  4.7     [10-01-17 @ 06:10]    TPro  8.5  /  Alb  3.9  /  TBili  0.4  /  DBili  x   /  AST  12  /  ALT  6   /  AlkPhos  72  [09-30-17 @ 05:40]    Creatinine Trend:  SCr 3.59 [10-01 @ 06:10]  SCr 3.90 [09-30 @ 05:40]  SCr 3.75 [09-29 @ 06:30]  SCr 4.11 [09-28 @ 06:30]  SCr 4.30 [09-27 @ 06:30]    Urinalysis - [09-26-17 @ 15:02]      Color PLYEL / Appearance CLEAR / SG 1.010 / pH 6.5      Gluc 50 / Ketone NEGATIVE  / Bili NEGATIVE / Urobili NORMAL       Blood NEGATIVE / Protein 30 / Leuk Est NEGATIVE / Nitrite NEGATIVE      RBC 0-2 / WBC 0-2 / Hyaline  / Gran  / Sq Epi OCC / Non Sq Epi  / Bacteria     Urine Creatinine 35.32      [09-26-17 @ 08:34]  Urine Protein 22.7      [09-26-17 @ 09:21]  Urine Sodium 102      [09-26-17 @ 08:34]    Iron 36, TIBC 202, %sat --      [09-27-17 @ 06:30]  Ferritin 112.8      [09-27-17 @ 06:30]  PTH 8.12 (Ca --)      [09-26-17 @ 07:35]   --  Vitamin D (25OH) 12.3      [09-26-17 @ 07:35]  HbA1c 6.9      [09-27-17 @ 06:30]    HCV 0.13, --      [09-30-17 @ 05:40]    ANCA: cANCA Negative, pANCA Negative, atypical ANCA --      [09-26-17 @ 09:09]  PR3-ANCA <5.0, interpretation: --      [09-26-17 @ 09:09]  Free Light Chains: kappa 17.40, lambda 8.98, ratio = 1.94      [09-26 @ 09:09]

## 2017-10-01 NOTE — PROGRESS NOTE ADULT - PROBLEM SELECTOR PLAN 2
Sightly elevated today. Would benefit from steroids given elevated Vit D 1,25 pt s/p 1 dose prednisone last nigh. Ca uptrended today likely 2/2 d/c of IV fluids   -Strict I/Os  - Flow cytometry and LN biopsy pending for lymphoma work up Still elevated today (11.7). Would benefit from steroids given elevated Vit D 1,25 pt s/p 1 dose prednisone last nigh. Ca uptrended today likely 2/2 d/c of IV fluids   -Strict I/Os  - Flow cytometry and LN biopsy pending for lymphoma work up

## 2017-10-01 NOTE — PROGRESS NOTE ADULT - PROBLEM SELECTOR PLAN 6
Unclear etiology but pt now has two cell lines affected concerning for primary marrow issue. Improving  -Daily CBC

## 2017-10-01 NOTE — PROGRESS NOTE ADULT - PROBLEM SELECTOR PLAN 2
Stable at current level. As per rheum team pt's dyspnea improved with lasix so no acute need for steroids at this time    -Strict I/Os  - Flow cytometry and LN biopsy pending for lymphoma work up

## 2017-10-01 NOTE — PROGRESS NOTE ADULT - PROBLEM SELECTOR PLAN 1
Pt s/p 1 dose lasix with 1.8L output and pt no longer needs supplemental O2. She denies dyspnea at this time    - Pulm consult appreciated  -c/w Unasyn and bruce for presumed pna  -Will ambulate patient to see if she desaturates on RA.

## 2017-10-01 NOTE — PROGRESS NOTE ADULT - PROBLEM SELECTOR PLAN 2
Pt. with mild hyperkalemia in setting of renal failure. Serum potassium is 3.6 today. Low potassium diet. Monitor serum potassium Pt. with mild hyperkalemia in setting of renal failure. Serum potassium WNL (3.6) today. Low potassium diet. Monitor serum potassium

## 2017-10-01 NOTE — PROGRESS NOTE ADULT - PROBLEM SELECTOR PLAN 3
Pt with persistent fevers of unclear etiology. Infectious w/u entirely negative. Differential includes tumor fever vs PE vs autoimmune process   -Will monitor response to steroids. Pt with persistent fevers of unclear etiology. Infectious w/u entirely negative. Differential includes tumor fever vs PE vs autoimmune process   -C/w unasyn and vanc for presumed PNA

## 2017-10-01 NOTE — PROGRESS NOTE ADULT - SUBJECTIVE AND OBJECTIVE BOX
Patient is a 47y old  Female who presents with a chief complaint of High calcium and my kidney sent by doctor (26 Sep 2017 16:12)        SUBJECTIVE / OVERNIGHT EVENTS:Pt reports no overnight events. Feels well. Ambulating       MEDICATIONS  (STANDING):  insulin lispro (HumaLOG) corrective regimen sliding scale   SubCutaneous three times a day before meals  insulin lispro (HumaLOG) corrective regimen sliding scale   SubCutaneous at bedtime  dextrose 5%. 1000 milliLiter(s) (50 mL/Hr) IV Continuous <Continuous>  dextrose 50% Injectable 12.5 Gram(s) IV Push once  dextrose 50% Injectable 25 Gram(s) IV Push once  dextrose 50% Injectable 25 Gram(s) IV Push once  influenza   Vaccine 0.5 milliLiter(s) IntraMuscular once  ampicillin/sulbactam  IVPB      heparin  Injectable 5000 Unit(s) SubCutaneous every 8 hours  ampicillin/sulbactam  IVPB 1.5 Gram(s) IV Intermittent every 12 hours  pantoprazole    Tablet 40 milliGRAM(s) Oral before breakfast    MEDICATIONS  (PRN):  dextrose Gel 1 Dose(s) Oral once PRN Blood Glucose LESS THAN 70 milliGRAM(s)/deciliter  glucagon  Injectable 1 milliGRAM(s) IntraMuscular once PRN Glucose LESS THAN 70 milligrams/deciliter  oxyCODONE    5 mG/acetaminophen 325 mG 1 Tablet(s) Oral every 6 hours PRN Severe Pain (7 - 10)  melatonin 3 milliGRAM(s) Oral at bedtime PRN Insomnia        CAPILLARY BLOOD GLUCOSE  136 (01 Oct 2017 08:31)  224 (30 Sep 2017 21:54)  194 (30 Sep 2017 16:33)  243 (30 Sep 2017 12:04)        I&O's Summary      PHYSICAL EXAM  GENERAL: NAD, well-developed  HEAD:  Atraumatic, Normocephalic  EYES: EOMI, PERRLA, conjunctiva and sclera clear  NECK: Supple, No JVD  CHEST/LUNG: Clear to auscultation bilaterally; No wheeze  HEART: Regular rate and rhythm; No murmurs, rubs, or gallops  ABDOMEN: Soft, Nontender, Nondistended; Bowel sounds present  EXTREMITIES:  2+ Peripheral Pulses, No clubbing, cyanosis, or edema,axillary FESTUS  PSYCH: AAOx3  SKIN: No rashes or lesions    LABS:                        8.9    4.10  )-----------( 226      ( 01 Oct 2017 06:15 )             26.7     10-01    138  |  97<L>  |  55<H>  ----------------------------<  163<H>  3.6   |  24  |  3.59<H>    Ca    11.7<H>      01 Oct 2017 06:10  Phos  4.7     10-01  Mg     1.9     10-01    TPro  8.5<H>  /  Alb  3.9  /  TBili  0.4  /  DBili  x   /  AST  12  /  ALT  6   /  AlkPhos  72  09-30              RADIOLOGY & ADDITIONAL TESTS:    Imaging Personally Reviewed:  Consultant(s) Notes Reviewed:    Care Discussed with Consultants/Other Providers:

## 2017-10-01 NOTE — PROGRESS NOTE ADULT - PROBLEM SELECTOR PLAN 6
Unclear etiology but pt now has two cell lines affected concerning for primary marrow issue  - Hematology c/s: recommending hepatitis panel, uric acid, PTH-rp and CT A/P (HIV -, ) Unclear etiology but concern for primary marrow issue (hep panel neg to date, uric acid elevated, HIV neg, )  - Appreciate Hematology recs

## 2017-10-01 NOTE — PROGRESS NOTE ADULT - SUBJECTIVE AND OBJECTIVE BOX
LEONEL BEVERLY  47y  Female      Patient is a 47y old  Female who presents with a chief complaint of High calcium and my kidney sent by doctor (26 Sep 2017 16:12)      INTERVAL HPI/OVERNIGHT EVENTS:    REVIEW OF SYSTEMS:  CONSTITUTIONAL: No fever, weight loss, or fatigue  EYES: No eye pain, visual disturbances, or discharge  ENMT:  No difficulty hearing, tinnitus, vertigo; No sinus or throat pain  NECK: No pain or stiffness  RESPIRATORY: No cough, wheezing, chills or hemoptysis; No shortness of breath  CARDIOVASCULAR: No chest pain, palpitations, dizziness, or leg swelling  GASTROINTESTINAL: No abdominal or epigastric pain. No diarrhea or constipation. No nausea, vomiting, or hematemesis. No melena or hematochezia.  GENITOURINARY: No dysuria, frequency, hematuria, or incontinence  NEUROLOGICAL: No headaches, memory loss, loss of strength, numbness, or tremors  MUSCULOSKELETAL: No joint pain or swelling; No muscle, back, or extremity pain  SKIN: No itching, burning, rashes, or lesions   LYMPH NODES: No enlarged glands  ENDOCRINE: No heat or cold intolerance; No hair loss  PSYCHIATRIC: No depression, anxiety, mood swings, or difficulty sleeping  HEME/LYMPH: No easy bruising, or bleeding gums  ALLERY AND IMMUNOLOGIC: No hives or eczema    T(C): 36.6 (10-01-17 @ 05:49), Max: 36.7 (09-30-17 @ 20:10)  HR: 82 (10-01-17 @ 05:49) (82 - 89)  BP: 127/64 (10-01-17 @ 05:49) (121/68 - 141/80)  RR: 16 (10-01-17 @ 05:49) (16 - 18)  SpO2: 98% (10-01-17 @ 05:49) (96% - 100%)  Wt(kg): --Vital Signs Last 24 Hrs  T(C): 36.6 (01 Oct 2017 05:49), Max: 36.7 (30 Sep 2017 20:10)  T(F): 97.9 (01 Oct 2017 05:49), Max: 98 (30 Sep 2017 20:10)  HR: 82 (01 Oct 2017 05:49) (82 - 89)  BP: 127/64 (01 Oct 2017 05:49) (121/68 - 141/80)  BP(mean): --  RR: 16 (01 Oct 2017 05:49) (16 - 18)  SpO2: 98% (01 Oct 2017 05:49) (96% - 100%)  Wt(kg): --    PHYSICAL EXAM:  GENERAL: NAD, well-groomed, well-developed  HEAD:  Atraumatic, Normocephalic  EYES: EOMI, PERRLA, conjunctiva and sclera clear  ENMT: Moist mucous membranes, Good dentition, No lesions  NECK: Supple, No JVD, Normal thyroid  NERVOUS SYSTEM:  Alert & Oriented X3, Good concentration; Motor Strength 5/5 B/L upper and lower extremities; DTRs 2+ intact and symmetric  CHEST/LUNG: Clear to auscultation bilaterally; No rales, rhonchi, wheezing, or rubs  HEART: Regular rate and rhythm; No murmurs, rubs, or gallops  ABDOMEN: Soft, Nontender, Nondistended; Bowel sounds present  EXTREMITIES:  2+ Peripheral Pulses, No clubbing, cyanosis, or edema  LYMPH: No lymphadenopathy noted  SKIN: No rashes or lesions    Consultant(s) Notes Reviewed:  [x ] YES  [ ] NO  Care Discussed with Consultants/Other Providers [ x] YES  [ ] NO    LABS:                        10.1   3.77  )-----------( 225      ( 30 Sep 2017 05:40 )             30.7     09-30    137  |  98  |  43<H>  ----------------------------<  246<H>  5.3   |  20<L>  |  3.90<H>    Ca    12.4<H>      30 Sep 2017 05:40  Phos  6.3     09-30  Mg     1.9     09-30    TPro  8.5<H>  /  Alb  3.9  /  TBili  0.4  /  DBili  x   /  AST  12  /  ALT  6   /  AlkPhos  72  09-30        CAPILLARY BLOOD GLUCOSE  224 (30 Sep 2017 21:54)  194 (30 Sep 2017 16:33)  243 (30 Sep 2017 12:04)  203 (30 Sep 2017 08:30) LEONEL BEVERLY  47y  Female      Patient is a 47y old  Female who presents with a chief complaint of High calcium and my kidney sent by doctor (26 Sep 2017 16:12)      INTERVAL HPI/OVERNIGHT EVENTS:    Pt reports episode of burning in upper abdominal area ON. Denies dyspnea, CP, SOB, n/v/c/d. Last BM Friday, no dysuria, changes in urine frequency, color, or appearance. Has been walking around the floor, no pain in lower extremities.    REVIEW OF SYSTEMS:  CONSTITUTIONAL: No fever, weight loss, or fatigue  EYES: No eye pain, visual disturbances, or discharge  ENMT:  No difficulty hearing, tinnitus, vertigo; No sinus or throat pain  NECK: No pain or stiffness  RESPIRATORY: +cough. No wheezing, chills or hemoptysis; No shortness of breath  CARDIOVASCULAR: No chest pain, palpitations, dizziness, or leg swelling  GASTROINTESTINAL: +epigastric burning. No abdominal pain. No diarrhea or constipation. No nausea, vomiting, or hematemesis. No melena or hematochezia.  GENITOURINARY: No dysuria, frequency, hematuria, or incontinence  NEUROLOGICAL: No headaches, memory loss, loss of strength, numbness, or tremors  MUSCULOSKELETAL: No joint pain or swelling; No muscle, back, or extremity pain  SKIN: No itching, burning, rashes, or lesions   LYMPH NODES: Diffuse enlarged glands  ENDOCRINE: No heat or cold intolerance; No hair loss  PSYCHIATRIC: No depression, anxiety, mood swings, or difficulty sleeping  HEME/LYMPH: No easy bruising, or bleeding gums  ALLERY AND IMMUNOLOGIC: No hives or eczema    T(C): 36.6 (10-01-17 @ 05:49), Max: 36.7 (09-30-17 @ 20:10)  HR: 82 (10-01-17 @ 05:49) (82 - 89)  BP: 127/64 (10-01-17 @ 05:49) (121/68 - 141/80)  RR: 16 (10-01-17 @ 05:49) (16 - 18)  SpO2: 98% (10-01-17 @ 05:49) (96% - 100%)  Wt(kg): --Vital Signs Last 24 Hrs  T(C): 36.6 (01 Oct 2017 05:49), Max: 36.7 (30 Sep 2017 20:10)  T(F): 97.9 (01 Oct 2017 05:49), Max: 98 (30 Sep 2017 20:10)  HR: 82 (01 Oct 2017 05:49) (82 - 89)  BP: 127/64 (01 Oct 2017 05:49) (121/68 - 141/80)  BP(mean): --  RR: 16 (01 Oct 2017 05:49) (16 - 18)  SpO2: 98% (01 Oct 2017 05:49) (96% - 100%)  Wt(kg): --    PHYSICAL EXAM:  GENERAL: NAD, well-groomed, well-developed  HEAD:  Atraumatic, Normocephalic  EYES: EOMI, PERRLA, conjunctiva and sclera clear  ENMT: Moist mucous membranes, Good dentition, No lesions  NECK: Supple, No JVD, Normal thyroid  NERVOUS SYSTEM:  Alert & Oriented X3, Good concentration; Motor Strength 5/5 B/L upper and lower extremities; DTRs 2+ intact and symmetric  CHEST/LUNG: No rales, rhonchi, wheezing, or rubs  HEART: Regular rate and rhythm; Soft systolic murmur. No rubs, or gallops  ABDOMEN: Soft, Nontender, Nondistended; Bowel sounds present  EXTREMITIES:  2+ Peripheral Pulses, No clubbing, cyanosis, or edema  LYMPH: LAD - axillary, supraclavicular, anterior and posterior cervical  SKIN: No rashes or lesions    Consultant(s) Notes Reviewed:  [x ] YES  [ ] NO  Care Discussed with Consultants/Other Providers [ x] YES  [ ] NO    LABS:                                   8.9<L>    4.10  )-----------( 226      ( 01 Oct 2017 06:15 )             26.7<L>     10-01    138  |  97<L>  |  55<H>  ----------------------------<  163<H>  3.6   |  24        |  3.59<H>    Ca    11.7<H>      01 Oct 2017 06:10  Phos  4.7     10-01  Mg     1.9     10-01    TPro  8.5<H>  /  Alb  3.9  /  TBili  0.4  /  DBili  x   /  AST  12  /  ALT  6   /  AlkPhos  72  09-30      CAPILLARY BLOOD GLUCOSE  224 (30 Sep 2017 21:54)  194 (30 Sep 2017 16:33)  243 (30 Sep 2017 12:04)  203 (30 Sep 2017 08:30)

## 2017-10-01 NOTE — PROGRESS NOTE ADULT - PROBLEM SELECTOR PLAN 3
Pt with persistent fevers of unclear etiology. Infectious w/u entirely negative. Pt afebrile last 24 hours. Differential includes tumor fever vs PE vs autoimmune process vs infection   -c/w Antibiotics

## 2017-10-01 NOTE — PROGRESS NOTE ADULT - PROBLEM SELECTOR PLAN 9
Pt has been off treatment for over 3 years because she has not followed up with her pulmonologist  Rheum consult appreciated. Pt has hx of biopsy proven sarcoid in 2007 and LN Bx in 2014 showing sarcoid. EF 65% no evidence of sarcoid on TTE thought MRI would be most sensitive test.  -F/u ACE level  -Hold off on steroids until we have definitive diagnosis

## 2017-10-01 NOTE — PROGRESS NOTE ADULT - PROBLEM SELECTOR PLAN 4
Stable. s/p 1 dose of Lasix yesterday. W/u for myeloma entirely neg. 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

## 2017-10-01 NOTE — PROGRESS NOTE ADULT - PROBLEM SELECTOR PLAN 1
Pt. with most likely hemodynamically mediated MIKALA in setting of hypercalcemia and recent NSAID use. Scr has improved to 3.59 today from 3.90 yesterday. Patient reports voiding well.  Monitor labs and urine output. Avoid nephrotoxins, NSAIDs, and RCA Pt. with most likely hemodynamically mediated MIKALA in setting of hypercalcemia and recent NSAID use. Scr decreased to 3.59 today from 3.90 yesterday. Patient reports voiding well.  Monitor labs and urine output. Avoid nephrotoxins, NSAIDs, and RCA

## 2017-10-01 NOTE — PROGRESS NOTE ADULT - ASSESSMENT
47-year-old female with known history of sarcoidosis, DM presented to [facility] with complaints of weakness, back pain and LE pain. Pt. found to have MIKALA and hypercalcemia.

## 2017-10-02 LAB
ACE SERPL-CCNC: 223 U/L — HIGH (ref 14–82)
BASOPHILS # BLD AUTO: 0.03 K/UL — SIGNIFICANT CHANGE UP (ref 0–0.2)
BASOPHILS NFR BLD AUTO: 0.8 % — SIGNIFICANT CHANGE UP (ref 0–2)
BUN SERPL-MCNC: 53 MG/DL — HIGH (ref 7–23)
CALCIUM SERPL-MCNC: 11.4 MG/DL — HIGH (ref 8.4–10.5)
CHLORIDE SERPL-SCNC: 100 MMOL/L — SIGNIFICANT CHANGE UP (ref 98–107)
CO2 SERPL-SCNC: 24 MMOL/L — SIGNIFICANT CHANGE UP (ref 22–31)
CREAT SERPL-MCNC: 3.71 MG/DL — HIGH (ref 0.5–1.3)
EOSINOPHIL # BLD AUTO: 0.69 K/UL — HIGH (ref 0–0.5)
EOSINOPHIL NFR BLD AUTO: 18.5 % — HIGH (ref 0–6)
GLUCOSE SERPL-MCNC: 170 MG/DL — HIGH (ref 70–99)
HCT VFR BLD CALC: 28.9 % — LOW (ref 34.5–45)
HGB BLD-MCNC: 9.4 G/DL — LOW (ref 11.5–15.5)
HTLV I+II AB PATRN SER RIPA-IMP: SIGNIFICANT CHANGE UP
IMM GRANULOCYTES # BLD AUTO: 0.05 # — SIGNIFICANT CHANGE UP
IMM GRANULOCYTES NFR BLD AUTO: 1.3 % — SIGNIFICANT CHANGE UP (ref 0–1.5)
LYMPHOCYTES # BLD AUTO: 0.66 K/UL — LOW (ref 1–3.3)
LYMPHOCYTES # BLD AUTO: 17.7 % — SIGNIFICANT CHANGE UP (ref 13–44)
MAGNESIUM SERPL-MCNC: 1.8 MG/DL — SIGNIFICANT CHANGE UP (ref 1.6–2.6)
MCHC RBC-ENTMCNC: 25.7 PG — LOW (ref 27–34)
MCHC RBC-ENTMCNC: 32.5 % — SIGNIFICANT CHANGE UP (ref 32–36)
MCV RBC AUTO: 79 FL — LOW (ref 80–100)
MONOCYTES # BLD AUTO: 0.4 K/UL — SIGNIFICANT CHANGE UP (ref 0–0.9)
MONOCYTES NFR BLD AUTO: 10.8 % — SIGNIFICANT CHANGE UP (ref 2–14)
NEUTROPHILS # BLD AUTO: 1.89 K/UL — SIGNIFICANT CHANGE UP (ref 1.8–7.4)
NEUTROPHILS NFR BLD AUTO: 50.9 % — SIGNIFICANT CHANGE UP (ref 43–77)
NRBC # FLD: 0 — SIGNIFICANT CHANGE UP
PHOSPHATE SERPL-MCNC: 4.6 MG/DL — HIGH (ref 2.5–4.5)
PLATELET # BLD AUTO: 246 K/UL — SIGNIFICANT CHANGE UP (ref 150–400)
PMV BLD: 9 FL — SIGNIFICANT CHANGE UP (ref 7–13)
POTASSIUM SERPL-MCNC: 4.4 MMOL/L — SIGNIFICANT CHANGE UP (ref 3.5–5.3)
POTASSIUM SERPL-SCNC: 4.4 MMOL/L — SIGNIFICANT CHANGE UP (ref 3.5–5.3)
RBC # BLD: 3.66 M/UL — LOW (ref 3.8–5.2)
RBC # FLD: 13.2 % — SIGNIFICANT CHANGE UP (ref 10.3–14.5)
SODIUM SERPL-SCNC: 140 MMOL/L — SIGNIFICANT CHANGE UP (ref 135–145)
TM INTERPRETATION: SIGNIFICANT CHANGE UP
VANCOMYCIN TROUGH SERPL-MCNC: 19.8 UG/ML — SIGNIFICANT CHANGE UP (ref 10–20)
WBC # BLD: 3.72 K/UL — LOW (ref 3.8–10.5)
WBC # FLD AUTO: 3.72 K/UL — LOW (ref 3.8–10.5)

## 2017-10-02 PROCEDURE — 99232 SBSQ HOSP IP/OBS MODERATE 35: CPT | Mod: GC

## 2017-10-02 PROCEDURE — 99233 SBSQ HOSP IP/OBS HIGH 50: CPT | Mod: GC

## 2017-10-02 RX ORDER — DOCUSATE SODIUM 100 MG
100 CAPSULE ORAL THREE TIMES A DAY
Qty: 0 | Refills: 0 | Status: DISCONTINUED | OUTPATIENT
Start: 2017-10-02 | End: 2017-10-03

## 2017-10-02 RX ORDER — SENNA PLUS 8.6 MG/1
2 TABLET ORAL AT BEDTIME
Qty: 0 | Refills: 0 | Status: DISCONTINUED | OUTPATIENT
Start: 2017-10-02 | End: 2017-10-03

## 2017-10-02 RX ADMIN — Medication 100 MILLIGRAM(S): at 21:26

## 2017-10-02 RX ADMIN — AMPICILLIN SODIUM AND SULBACTAM SODIUM 100 GRAM(S): 250; 125 INJECTION, POWDER, FOR SUSPENSION INTRAMUSCULAR; INTRAVENOUS at 06:21

## 2017-10-02 RX ADMIN — OXYCODONE AND ACETAMINOPHEN 1 TABLET(S): 5; 325 TABLET ORAL at 16:58

## 2017-10-02 RX ADMIN — PANTOPRAZOLE SODIUM 40 MILLIGRAM(S): 20 TABLET, DELAYED RELEASE ORAL at 06:21

## 2017-10-02 RX ADMIN — AMPICILLIN SODIUM AND SULBACTAM SODIUM 100 GRAM(S): 250; 125 INJECTION, POWDER, FOR SUSPENSION INTRAMUSCULAR; INTRAVENOUS at 17:07

## 2017-10-02 RX ADMIN — Medication 100 MILLIGRAM(S): at 13:47

## 2017-10-02 RX ADMIN — Medication 1: at 12:23

## 2017-10-02 RX ADMIN — OXYCODONE AND ACETAMINOPHEN 1 TABLET(S): 5; 325 TABLET ORAL at 16:13

## 2017-10-02 RX ADMIN — SENNA PLUS 2 TABLET(S): 8.6 TABLET ORAL at 21:26

## 2017-10-02 RX ADMIN — Medication 1: at 17:06

## 2017-10-02 NOTE — PROGRESS NOTE ADULT - PROBLEM SELECTOR PLAN 10
DVT PPX:Improve Score .4%. Low risk  Dispo:pending work up

## 2017-10-02 NOTE — PROGRESS NOTE ADULT - PROBLEM SELECTOR PLAN 2
Pt no longer needing supplemental O2. She denies dyspnea at this time    - c/w Unasyn and vanc for presumed pna  - Pulm consult appreciated

## 2017-10-02 NOTE — PROGRESS NOTE ADULT - SUBJECTIVE AND OBJECTIVE BOX
CHIEF COMPLAINT: abnormal lab values    Interval Events:  Patient states that she is feeling well and no longer needs oxygen. She had some knee pain overnight that was relieved with acetaminophen. She denies chest pain, headaches, palpitations, abdominal pain, N/V, diarrhea, dysuria.    REVIEW OF SYSTEMS:  Constitutional: [X ] negative [ ] fevers [ ] chills [ ] weight loss [ ] weight gain  HEENT: [X ] negative [ ] dry eyes [ ] eye irritation [ ] postnasal drip [ ] nasal congestion  CV: [ X] negative  [ ] chest pain [ ] orthopnea [ ] palpitations [ ] murmur  Resp: [ ] negative [ ] cough [X ] shortness of breath [ ] dyspnea [ ] wheezing [ ] sputum [ ] hemoptysis  GI: [X ] negative [ ] nausea [ ] vomiting [ ] diarrhea [ ] constipation [ ] abd pain [ ] dysphagia   : [X ] negative [ ] dysuria [ ] nocturia [ ] hematuria [ ] increased urinary frequency  Musculoskeletal: [ ] negative [ ] back pain [ ] myalgias [ X] arthralgias [ ] fracture  Skin: [X ] negative [ ] rash [ ] itch  Neurological: [X ] negative [ ] headache [ ] dizziness [ ] syncope [ ] weakness [ ] numbness  Psychiatric: [X ] negative [ ] anxiety [ ] depression  Endocrine: [ X] negative [ ] diabetes [ ] thyroid problem  Hematologic/Lymphatic: [X ] negative [ ] anemia [ ] bleeding problem  Allergic/Immunologic: [X ] negative [ ] itchy eyes [ ] nasal discharge [ ] hives [ ] angioedema  [ ] All other systems negative  [ ] Unable to assess ROS because ________    OBJECTIVE:  ICU Vital Signs Last 24 Hrs  T(C): 36.7 (02 Oct 2017 12:38), Max: 36.9 (01 Oct 2017 21:43)  T(F): 98 (02 Oct 2017 12:38), Max: 98.4 (01 Oct 2017 21:43)  HR: 87 (02 Oct 2017 12:38) (86 - 94)  BP: 145/79 (02 Oct 2017 12:38) (129/79 - 145/79)  BP(mean): --  ABP: --  ABP(mean): --  RR: 18 (02 Oct 2017 12:38) (17 - 18)  SpO2: 100% (02 Oct 2017 12:38) (97% - 100%)        CAPILLARY BLOOD GLUCOSE  166 (02 Oct 2017 12:16)        PHYSICAL EXAM:  General: Lying in bed no acute distress  HEENT: Injected conjunctiva, PERRL  Lymph Nodes: diffuse lymphadenopathy down cervical, posterior and SCM chain as well as axillary  Neck: No JVD  Respiratory: Mild inspiratory crackles L and R LL, no dullness to percussion  Cardiovascular: I/VI systolic murmur with normal S1, S2, no rubs or gallops  Abdomen: No TTP, normoactive BS, no distention  Extremities: No clubbing, 2+ dorsalis pedis pulses  Skin: right forearm erythema nodosum  Neurological: nonfocal, CN II-XII intact  Psychiatry: normal affect    HOSPITAL MEDICATIONS:  Standing Meds:  insulin lispro (HumaLOG) corrective regimen sliding scale   SubCutaneous three times a day before meals  insulin lispro (HumaLOG) corrective regimen sliding scale   SubCutaneous at bedtime  dextrose 5%. 1000 milliLiter(s) IV Continuous <Continuous>  dextrose 50% Injectable 12.5 Gram(s) IV Push once  dextrose 50% Injectable 25 Gram(s) IV Push once  dextrose 50% Injectable 25 Gram(s) IV Push once  influenza   Vaccine 0.5 milliLiter(s) IntraMuscular once  ampicillin/sulbactam  IVPB      heparin  Injectable 5000 Unit(s) SubCutaneous every 8 hours  ampicillin/sulbactam  IVPB 1.5 Gram(s) IV Intermittent every 12 hours  pantoprazole    Tablet 40 milliGRAM(s) Oral before breakfast  vancomycin  IVPB 500 milliGRAM(s) IV Intermittent every 24 hours      PRN Meds:  dextrose Gel 1 Dose(s) Oral once PRN  glucagon  Injectable 1 milliGRAM(s) IntraMuscular once PRN  oxyCODONE    5 mG/acetaminophen 325 mG 1 Tablet(s) Oral every 6 hours PRN  melatonin 3 milliGRAM(s) Oral at bedtime PRN      LABS:                        9.4    3.72  )-----------( 246      ( 02 Oct 2017 09:52 )             28.9     Hgb Trend: 9.4<--, 8.9<--, 10.1<--, 9.2<--, 8.1<--  10-02    140  |  100  |  53<H>  ----------------------------<  170<H>  4.4   |  24  |  3.71<H>    Ca    11.4<H>      02 Oct 2017 09:52  Phos  4.6     10-02  Mg     1.8     10-02      Creatinine Trend: 3.71<--, 3.59<--, 3.90<--, 3.75<--, 4.11<--, 4.30<--            MICROBIOLOGY:     RADIOLOGY:  [ ] Reviewed and interpreted by me  < from: Transthoracic Echocardiogram (09.29.17 @ 16:56) >  CONCLUSIONS:  1. Normal trileaflet aortic valve.  2. Normal left ventricular internal dimensions and wall  thicknesses.  3. Normal left ventricular systolic function. No segmental  wall motion abnormalities.  4. The right ventricle is not well visualized; grossly  normalright ventricular systolic function.    < end of copied text >

## 2017-10-02 NOTE — PROGRESS NOTE ADULT - PROBLEM SELECTOR PLAN 5
A1C 1 month ago was 7.8; FS trending upward in setting of steroids. Pt has elevated sugars after 1 dose of prednisone. s/p 5 units   -FS TID   -Diabetic diet  -Hold metformin   -Sliding scale.

## 2017-10-02 NOTE — PROGRESS NOTE ADULT - PROBLEM SELECTOR PLAN 1
Stable at current level. As per rheum team pt's dyspnea improved with lasix so no acute need for steroids at this time. PTHrP elevated to 2.9 (ref <2), possibly 2/2 paraneoplastic vs sarcoidosis.  - Strict I/Os  - Flow cytometry and LN biopsy pending for lymphoma work up

## 2017-10-02 NOTE — PROGRESS NOTE ADULT - ASSESSMENT
46 yo F with PMHx of TIIDM and sarcoidosis who presented for hypercalcemia and MIKALA. The patient has many features of uncontrolled sarcoidosis and could be manifesting some of the cardiac and respiratory manifestations such as pulmonary fibrosis. The CT chest did not show extensive fibrotic change making ILD component less likely to be causing the dyspnea. Unclear cause of eosinophilia. Currently being worked up for possible hematologic malignancy. TTE did not show signs of RH failure, however on bedside echo patient did have signs of mild pulmonary edema without peripheral edema.  -Can follow up with pulmonary outpatient for sarcoidosis management and steroid taper  -spirometry to determine type of lung disease  -follow up with lymphnode biospy

## 2017-10-02 NOTE — PROGRESS NOTE ADULT - SUBJECTIVE AND OBJECTIVE BOX
LEONEL SIRIA  [id number]    INTERVAL HPI/OVERNIGHT EVENTS:    MEDICATIONS  (STANDING):  insulin lispro (HumaLOG) corrective regimen sliding scale   SubCutaneous three times a day before meals  insulin lispro (HumaLOG) corrective regimen sliding scale   SubCutaneous at bedtime  dextrose 5%. 1000 milliLiter(s) (50 mL/Hr) IV Continuous <Continuous>  dextrose 50% Injectable 12.5 Gram(s) IV Push once  dextrose 50% Injectable 25 Gram(s) IV Push once  dextrose 50% Injectable 25 Gram(s) IV Push once  influenza   Vaccine 0.5 milliLiter(s) IntraMuscular once  ampicillin/sulbactam  IVPB      heparin  Injectable 5000 Unit(s) SubCutaneous every 8 hours  ampicillin/sulbactam  IVPB 1.5 Gram(s) IV Intermittent every 12 hours  pantoprazole    Tablet 40 milliGRAM(s) Oral before breakfast  vancomycin  IVPB 500 milliGRAM(s) IV Intermittent every 24 hours    MEDICATIONS  (PRN):  dextrose Gel 1 Dose(s) Oral once PRN Blood Glucose LESS THAN 70 milliGRAM(s)/deciliter  glucagon  Injectable 1 milliGRAM(s) IntraMuscular once PRN Glucose LESS THAN 70 milligrams/deciliter  oxyCODONE    5 mG/acetaminophen 325 mG 1 Tablet(s) Oral every 6 hours PRN Severe Pain (7 - 10)  melatonin 3 milliGRAM(s) Oral at bedtime PRN Insomnia      Allergies: No Known Allergies    Intolerances    Review of Systems:   General:   HEENT:   CVS:   Resp:   GI:   MSK:   Skin:   Neuro:       Vital Signs Last 24 Hrs  T(C): 36.7 (02 Oct 2017 12:38), Max: 36.9 (01 Oct 2017 21:43)  T(F): 98 (02 Oct 2017 12:38), Max: 98.4 (01 Oct 2017 21:43)  HR: 87 (02 Oct 2017 12:38) (86 - 94)  BP: 145/79 (02 Oct 2017 12:38) (129/79 - 145/79)  RR: 18 (02 Oct 2017 12:38) (17 - 18)  SpO2: 100% (02 Oct 2017 12:38) (97% - 100%)    Physical Exam:  General:  HEENT:   Cardio:   Resp:   GI:   MSK:  Neuro:   Psych:     LABS:                        9.4    3.72  )-----------( 246      ( 02 Oct 2017 09:52 )             28.9     Auto Eosinophil %: 18.5 % (10.02.17 @ 09:52)    10-02    140  |  100  |  53<H>  ----------------------------<  170<H>  4.4   |  24  |  3.71<H>    Ca    11.4<H>      02 Oct 2017 09:52  Phos  4.6     10-02  Mg     1.8     10-02    PTH Related Peptide: 2.9 (09.26.17 @ 07:35)    RADIOLOGY & ADDITIONAL TESTS:  TTE 9/29/2017  "Ejection Fraction (Teicholtz): 65 %  CONCLUSIONS:  1. Normal trileaflet aortic valve.  2. Normal left ventricular internal dimensions and wall  thicknesses.  3. Normal left ventricular systolic function. No segmental  wall motion abnormalities.  4. The right ventricle is not well visualized; grossly  normalright ventricular systolic function." LEONEL SIIRA  [id number]    INTERVAL HPI/OVERNIGHT EVENTS:  Transient sharp lower-extremity pain, localized around knee    MEDICATIONS  (STANDING):  insulin lispro (HumaLOG) corrective regimen sliding scale   SubCutaneous three times a day before meals  insulin lispro (HumaLOG) corrective regimen sliding scale   SubCutaneous at bedtime  dextrose 5%. 1000 milliLiter(s) (50 mL/Hr) IV Continuous <Continuous>  dextrose 50% Injectable 12.5 Gram(s) IV Push once  dextrose 50% Injectable 25 Gram(s) IV Push once  dextrose 50% Injectable 25 Gram(s) IV Push once  influenza   Vaccine 0.5 milliLiter(s) IntraMuscular once  ampicillin/sulbactam  IVPB      heparin  Injectable 5000 Unit(s) SubCutaneous every 8 hours  ampicillin/sulbactam  IVPB 1.5 Gram(s) IV Intermittent every 12 hours  pantoprazole    Tablet 40 milliGRAM(s) Oral before breakfast  vancomycin  IVPB 500 milliGRAM(s) IV Intermittent every 24 hours    MEDICATIONS  (PRN):  dextrose Gel 1 Dose(s) Oral once PRN Blood Glucose LESS THAN 70 milliGRAM(s)/deciliter  glucagon  Injectable 1 milliGRAM(s) IntraMuscular once PRN Glucose LESS THAN 70 milligrams/deciliter  oxyCODONE    5 mG/acetaminophen 325 mG 1 Tablet(s) Oral every 6 hours PRN Severe Pain (7 - 10)  melatonin 3 milliGRAM(s) Oral at bedtime PRN Insomnia      Allergies: No Known Allergies    Review of Systems:   General:   HEENT:   CVS:   Resp:   GI:   MSK:   Skin:   Neuro:       Vital Signs Last 24 Hrs  T(C): 36.7 (02 Oct 2017 12:38), Max: 36.9 (01 Oct 2017 21:43)  T(F): 98 (02 Oct 2017 12:38), Max: 98.4 (01 Oct 2017 21:43)  HR: 87 (02 Oct 2017 12:38) (86 - 94)  BP: 145/79 (02 Oct 2017 12:38) (129/79 - 145/79)  RR: 18 (02 Oct 2017 12:38) (17 - 18)  SpO2: 100% (02 Oct 2017 12:38) (97% - 100%)    Physical Exam:  General:  HEENT:   Cardio:   Resp:   GI:   MSK:  Neuro:   Psych:     LABS:                        9.4    3.72  )-----------( 246      ( 02 Oct 2017 09:52 )             28.9     Auto Eosinophil %: 18.5 % (10.02.17 @ 09:52)    10-02    140  |  100  |  53<H>  ----------------------------<  170<H>  4.4   |  24  |  3.71<H>    Ca    11.4<H>      02 Oct 2017 09:52  Phos  4.6     10-02  Mg     1.8     10-02    PTH Related Peptide: 2.9 (09.26.17 @ 07:35)    RADIOLOGY & ADDITIONAL TESTS:  TTE 9/29/2017  "Ejection Fraction (Teicholtz): 65 %  CONCLUSIONS:  1. Normal trileaflet aortic valve.  2. Normal left ventricular internal dimensions and wall  thicknesses.  3. Normal left ventricular systolic function. No segmental  wall motion abnormalities.  4. The right ventricle is not well visualized; grossly  normalright ventricular systolic function." LEONEL BEVERLY  [id number]    INTERVAL HPI/OVERNIGHT EVENTS:  Lower-extremity transient pain (see ROS below)    MEDICATIONS  (STANDING):  insulin lispro (HumaLOG) corrective regimen sliding scale   SubCutaneous three times a day before meals  insulin lispro (HumaLOG) corrective regimen sliding scale   SubCutaneous at bedtime  dextrose 5%. 1000 milliLiter(s) (50 mL/Hr) IV Continuous <Continuous>  dextrose 50% Injectable 12.5 Gram(s) IV Push once  dextrose 50% Injectable 25 Gram(s) IV Push once  dextrose 50% Injectable 25 Gram(s) IV Push once  influenza   Vaccine 0.5 milliLiter(s) IntraMuscular once  ampicillin/sulbactam  IVPB      heparin  Injectable 5000 Unit(s) SubCutaneous every 8 hours  ampicillin/sulbactam  IVPB 1.5 Gram(s) IV Intermittent every 12 hours  pantoprazole    Tablet 40 milliGRAM(s) Oral before breakfast  vancomycin  IVPB 500 milliGRAM(s) IV Intermittent every 24 hours    MEDICATIONS  (PRN):  dextrose Gel 1 Dose(s) Oral once PRN Blood Glucose LESS THAN 70 milliGRAM(s)/deciliter  glucagon  Injectable 1 milliGRAM(s) IntraMuscular once PRN Glucose LESS THAN 70 milligrams/deciliter  oxyCODONE    5 mG/acetaminophen 325 mG 1 Tablet(s) Oral every 6 hours PRN Severe Pain (7 - 10)  melatonin 3 milliGRAM(s) Oral at bedtime PRN Insomnia    Allergies: No Known Allergies    Review of Systems:   General: Main complaint is lower-extremity pain  HEENT: Coughing occasionally, non-productive  CVS: No chest pain  Resp: No abdominal pain  GI: No abdominal pain  MSK: Transient lower-extremity sharp pain, localized around knees, worse R than L, not as severe as the lower-extremity pain that she had on admission  Skin: No new rashes. Site of L axillary lymph-node biopsy not painful.       Vital Signs Last 24 Hrs  T(C): 36.7 (02 Oct 2017 12:38), Max: 36.9 (01 Oct 2017 21:43)  T(F): 98 (02 Oct 2017 12:38), Max: 98.4 (01 Oct 2017 21:43)  HR: 87 (02 Oct 2017 12:38) (86 - 94)  BP: 145/79 (02 Oct 2017 12:38) (129/79 - 145/79)  RR: 18 (02 Oct 2017 12:38) (17 - 18)  SpO2: 100% (02 Oct 2017 12:38) (97% - 100%)    Physical Exam:  General: Lying in bed comfortably  Lymph: Lymphadenopathy cervically, supraclavicularly, R axillary  Cardio: Regular rate and rhythm  Resp: Breath sounds soft bilaterally; no wheezes  Abd: Non-tender  MSK: Knees non-tender and without effusions bilaterally  Neuro: Moves all four extremities  Psych: pleasant, coherent, goal-oriented    LABS:                        9.4    3.72  )-----------( 246      ( 02 Oct 2017 09:52 )             28.9     Auto Eosinophil %: 18.5 % (10.02.17 @ 09:52)    10-02    140  |  100  |  53<H>  ----------------------------<  170<H>  4.4   |  24  |  3.71<H>    Ca    11.4<H>      02 Oct 2017 09:52  Phos  4.6     10-02  Mg     1.8     10-02    PTH Related Peptide: 2.9 [<2] (09.26.17 @ 07:35)    Angiotensin Converting Enzyme, Serum: 223 [14-82] (09.30.17 @ 05:40)    Flow Cytometry -- TM Interpretation (09.29.17 @ 12:00)  "DIAGNOSIS:  Peripheral blood:       - The lymphocyte immunophenotypic findings show no diagnostic abnormalities."    RADIOLOGY & ADDITIONAL TESTS:  TTE 9/29/2017  "Ejection Fraction (Teicholtz): 65 %  CONCLUSIONS:  1. Normal trileaflet aortic valve.  2. Normal left ventricular internal dimensions and wall  thicknesses.  3. Normal left ventricular systolic function. No segmental  wall motion abnormalities.  4. The right ventricle is not well visualized; grossly  normalright ventricular systolic function." LEONEL BEVERLY  [id number]    INTERVAL HPI/OVERNIGHT EVENTS:  Lower-extremity transient pain (see ROS below)  chart reviewed  MEDICATIONS  (STANDING):  insulin lispro (HumaLOG) corrective regimen sliding scale   SubCutaneous three times a day before meals  insulin lispro (HumaLOG) corrective regimen sliding scale   SubCutaneous at bedtime  dextrose 5%. 1000 milliLiter(s) (50 mL/Hr) IV Continuous <Continuous>  dextrose 50% Injectable 12.5 Gram(s) IV Push once  dextrose 50% Injectable 25 Gram(s) IV Push once  dextrose 50% Injectable 25 Gram(s) IV Push once  influenza   Vaccine 0.5 milliLiter(s) IntraMuscular once  ampicillin/sulbactam  IVPB      heparin  Injectable 5000 Unit(s) SubCutaneous every 8 hours  ampicillin/sulbactam  IVPB 1.5 Gram(s) IV Intermittent every 12 hours  pantoprazole    Tablet 40 milliGRAM(s) Oral before breakfast  vancomycin  IVPB 500 milliGRAM(s) IV Intermittent every 24 hours    MEDICATIONS  (PRN):  dextrose Gel 1 Dose(s) Oral once PRN Blood Glucose LESS THAN 70 milliGRAM(s)/deciliter  glucagon  Injectable 1 milliGRAM(s) IntraMuscular once PRN Glucose LESS THAN 70 milligrams/deciliter  oxyCODONE    5 mG/acetaminophen 325 mG 1 Tablet(s) Oral every 6 hours PRN Severe Pain (7 - 10)  melatonin 3 milliGRAM(s) Oral at bedtime PRN Insomnia    Allergies: No Known Allergies    Review of Systems:   General: Main complaint is lower-extremity pain  HEENT: Coughing occasionally, non-productive  CVS: No chest pain  Resp: No abdominal pain  GI: No abdominal pain  MSK: Transient lower-extremity sharp pain, localized around knees, worse R than L, not as severe as the lower-extremity pain that she had on admission  Skin: No new rashes. Site of L axillary lymph-node biopsy not painful.       Vital Signs Last 24 Hrs  T(C): 36.7 (02 Oct 2017 12:38), Max: 36.9 (01 Oct 2017 21:43)  T(F): 98 (02 Oct 2017 12:38), Max: 98.4 (01 Oct 2017 21:43)  HR: 87 (02 Oct 2017 12:38) (86 - 94)  BP: 145/79 (02 Oct 2017 12:38) (129/79 - 145/79)  RR: 18 (02 Oct 2017 12:38) (17 - 18)  SpO2: 100% (02 Oct 2017 12:38) (97% - 100%)    Physical Exam:  General: Lying in bed comfortably  Lymph: Lymphadenopathy cervically, supraclavicularly, R axillary  Cardio: Regular rate and rhythm  Resp: Breath sounds soft bilaterally; no wheezes  Abd: Non-tender  MSK: Knees non-tender and without effusions bilaterally  Neuro: Moves all four extremities  Psych: pleasant, coherent, goal-oriented    LABS:                        9.4    3.72  )-----------( 246      ( 02 Oct 2017 09:52 )             28.9     Auto Eosinophil %: 18.5 % (10.02.17 @ 09:52)    10-02    140  |  100  |  53<H>  ----------------------------<  170<H>  4.4   |  24  |  3.71<H>    Ca    11.4<H>      02 Oct 2017 09:52  Phos  4.6     10-02  Mg     1.8     10-02    PTH Related Peptide: 2.9 [<2] (09.26.17 @ 07:35)    Angiotensin Converting Enzyme, Serum: 223 [14-82] (09.30.17 @ 05:40)    Flow Cytometry -- TM Interpretation (09.29.17 @ 12:00)  "DIAGNOSIS:  Peripheral blood:       - The lymphocyte immunophenotypic findings show no diagnostic abnormalities."    RADIOLOGY & ADDITIONAL TESTS:  TTE 9/29/2017  "Ejection Fraction (Teicholtz): 65 %  CONCLUSIONS:  1. Normal trileaflet aortic valve.  2. Normal left ventricular internal dimensions and wall  thicknesses.  3. Normal left ventricular systolic function. No segmental  wall motion abnormalities.  4. The right ventricle is not well visualized; grossly  normalright ventricular systolic function."

## 2017-10-02 NOTE — PROGRESS NOTE ADULT - ASSESSMENT
47yoF hx of sarcoidosis (dx 2010 via biopsy showing noncaseating granulomas; not on any treatment), T2DM, sent in from outside rheumatologist for abnormal labs showing MIKALA and hypercalcemia, in setting of subacute-to-acute sx/sx including weight loss, night sweats, and dyspnea on exertion; diff dx includes sarcoid vs hematologic malignancy, s/p axillary lymph-node Core Biopsy 9/28, s/p prednisone 50mg PO x1 (9/29). Course c/b fevers (last fever 9/29 PM, SOB & hemoptysis on 9/28; being treated w vancomycin (9/29--> ) and Unasyn (9/29--> ).     PLAN  to be discussed with fellow, attending 47yoF hx of sarcoidosis (dx 2010 via biopsy showing noncaseating granulomas; not on any treatment), T2DM, sent in from outside rheumatologist for abnormal labs showing MIKALA and hypercalcemia, in setting of subacute-to-acute sx/sx including weight loss, night sweats, and dyspnea on exertion; diff dx includes sarcoid vs hematologic malignancy, s/p axillary lymph-node Core Biopsy 9/28, s/p prednisone 50mg PO x1 (9/29). Course c/b fevers (last fever 9/29 PM, SOB & hemoptysis on 9/28; being treated w vancomycin (9/29--> ) and Unasyn (9/29--> ).     PLAN  to be discussed with fellow, attending    Once biopsy is returned will start prednsione 50mg daily 47yoF hx of sarcoidosis (dx 2010 via biopsy showing noncaseating granulomas; not on any treatment), T2DM, sent in from outside rheumatologist for abnormal labs showing MIKALA and hypercalcemia, in setting of subacute-to-acute sx/sx including weight loss, night sweats, and dyspnea on exertion; diff dx includes sarcoid vs hematologic malignancy, s/p axillary lymph-node Core Biopsy 9/28, s/p prednisone 50mg PO x1 (9/29). Course c/b fevers (last fever 9/29 PM, SOB & hemoptysis on 9/28; being treated w vancomycin (9/29--> ) and Unasyn (9/29--> ).     PLAN  to be discussed with fellow, attending    Once biopsy is returned will start prednsione 50mg daily if clincially appropriate. 47yoF hx of sarcoidosis (dx 2010 via biopsy showing noncaseating granulomas; not on any treatment), T2DM, sent in from outside rheumatologist for abnormal labs showing MIKALA and hypercalcemia, in setting of subacute-to-acute sx/sx including weight loss, night sweats, and dyspnea on exertion; diff dx includes sarcoid vs hematologic malignancy, s/p axillary lymph-node Core Biopsy 9/28, s/p prednisone 50mg PO x1 (9/29). Course c/b fevers (last fever 9/29 PM, SOB & hemoptysis on 9/28; being treated w vancomycin (9/29--> ) and Unasyn (9/29--> ).     PLAN  Once biopsy is returned will start prednsione 50mg daily if clincially appropriate.   Given history of sarcoid recommend cardiac MRI, can be done inpatient or outpatient  Given history of sarcoid recommend ophthalmologic evaluation as outpatient

## 2017-10-02 NOTE — PROGRESS NOTE ADULT - SUBJECTIVE AND OBJECTIVE BOX
LEONEL BEVERLY  47y  Female      Patient is a 47y old  Female who presents with a chief complaint of High calcium and my kidney sent by doctor (26 Sep 2017 16:12)      INTERVAL HPI/OVERNIGHT EVENTS:    Sudden, sharp knee pain, alleviated by Tylenol. No other events overnight. Denies CP, SOB, abdominal pain, n/v/c/d.    REVIEW OF SYSTEMS:  CONSTITUTIONAL: No fever, weight loss, or fatigue  EYES: No eye pain, visual disturbances, or discharge  ENMT:  No difficulty hearing, tinnitus, vertigo; No sinus or throat pain  NECK: No pain or stiffness  RESPIRATORY: +cough. No wheezing, chills or hemoptysis; No shortness of breath  CARDIOVASCULAR: No chest pain, palpitations, dizziness, or leg swelling  GASTROINTESTINAL: No abdominal or epigastric pain. No diarrhea or constipation. No nausea, vomiting, or hematemesis. No melena or hematochezia.  GENITOURINARY: No dysuria, frequency, hematuria, or incontinence  NEUROLOGICAL: No headaches, memory loss, loss of strength, numbness, or tremors  MUSCULOSKELETAL: No joint pain or swelling; No muscle, back, or extremity pain  SKIN: No itching, burning, rashes, or lesions   LYMPH NODES: Diffuse enlarged glands  ENDOCRINE: No heat or cold intolerance; No hair loss  PSYCHIATRIC: No depression, anxiety, mood swings, or difficulty sleeping  HEME/LYMPH: No easy bruising, or bleeding gums  ALLERY AND IMMUNOLOGIC: No hives or eczema    T(C): 36.8 (10-02-17 @ 06:20), Max: 36.9 (10-01-17 @ 21:43)  HR: 86 (10-02-17 @ 06:20) (84 - 94)  BP: 129/79 (10-02-17 @ 06:20) (118/64 - 138/81)  RR: 17 (10-02-17 @ 06:20) (17 - 18)  SpO2: 97% (10-02-17 @ 06:20) (97% - 100%)  Wt(kg): --Vital Signs Last 24 Hrs  T(C): 36.8 (02 Oct 2017 06:20), Max: 36.9 (01 Oct 2017 21:43)  T(F): 98.2 (02 Oct 2017 06:20), Max: 98.4 (01 Oct 2017 21:43)  HR: 86 (02 Oct 2017 06:20) (84 - 94)  BP: 129/79 (02 Oct 2017 06:20) (118/64 - 138/81)  BP(mean): --  RR: 17 (02 Oct 2017 06:20) (17 - 18)  SpO2: 97% (02 Oct 2017 06:20) (97% - 100%)  Wt(kg): --    PHYSICAL EXAM:  GENERAL: NAD, well-groomed, well-developed  HEAD:  Atraumatic, Normocephalic  EYES: EOMI, PERRLA, conjunctiva and sclera clear  ENMT: Moist mucous membranes, Good dentition, No lesions  NECK: Supple, No JVD, Normal thyroid  NERVOUS SYSTEM:  Alert & Oriented X3, Good concentration; Motor Strength 5/5 B/L upper and lower extremities; DTRs 2+ intact and symmetric  CHEST/LUNG: Mild bibasilar crackles.; No rhonchi, wheezing, or rubs  HEART: Regular rate and rhythm; Subtle systolic murmur. No rubs or gallops  ABDOMEN: Soft, Nontender, Nondistended; Bowel sounds present  EXTREMITIES:  2+ Peripheral Pulses, No clubbing, cyanosis, or edema  LYMPH: LAD -axillary, supraclavicular, anterior and posterior cervical  SKIN: No new rashes or lesions    Consultant(s) Notes Reviewed:  [x ] YES  [ ] NO  Care Discussed with Consultants/Other Providers [ x] YES  [ ] NO    LABS:                          8.9<L>    4.10  )-----------( 226      ( 01 Oct 2017 06:15 )             26.7<L>     10-01    138  |  97<L>  |  55<H>  ----------------------------<  163<H>  3.6   |  24        |  3.59<H>    Ca    11.7<H>      01 Oct 2017 06:10  Phos  4.7     10-01  Mg     1.9     10-01           CAPILLARY BLOOD GLUCOSE  156 (01 Oct 2017 21:51)  184 (01 Oct 2017 16:26)  148 (01 Oct 2017 11:59)  136 (01 Oct 2017 08:31)

## 2017-10-02 NOTE — PROGRESS NOTE ADULT - PROBLEM SELECTOR PLAN 7
Pt has been off treatment for over 3 years because she has not followed up with her pulmonologist  Rheum consult appreciated. Pt has hx of biopsy proven sarcoid in 2007 and LN Bx in 2014 showing sarcoid. EF 65% no evidence of sarcoid on TTE thought MRI would be most sensitive test.  -F/u ACE level  -Hold off on steroids until we have definitive diagnosis Pt has been off treatment for over 3 years because she has not followed up with her pulmonologist  Rheum consult appreciated. Pt has hx of biopsy proven sarcoid in 2007 and LN Bx in 2014 showing sarcoid. EF 65% no evidence of sarcoid on TTE   -F/u ACE level  -Hold off on steroids until we have definitive diagnosis

## 2017-10-02 NOTE — PROGRESS NOTE ADULT - SUBJECTIVE AND OBJECTIVE BOX
Patient is a 47y old  Female who presents with a chief complaint of High calcium and my kidney sent by doctor (26 Sep 2017 16:12)        SUBJECTIVE / OVERNIGHT EVENTS:Pt has no acute complaints. No telemetry events       MEDICATIONS  (STANDING):  insulin lispro (HumaLOG) corrective regimen sliding scale   SubCutaneous three times a day before meals  insulin lispro (HumaLOG) corrective regimen sliding scale   SubCutaneous at bedtime  dextrose 5%. 1000 milliLiter(s) (50 mL/Hr) IV Continuous <Continuous>  dextrose 50% Injectable 12.5 Gram(s) IV Push once  dextrose 50% Injectable 25 Gram(s) IV Push once  dextrose 50% Injectable 25 Gram(s) IV Push once  influenza   Vaccine 0.5 milliLiter(s) IntraMuscular once  ampicillin/sulbactam  IVPB      heparin  Injectable 5000 Unit(s) SubCutaneous every 8 hours  ampicillin/sulbactam  IVPB 1.5 Gram(s) IV Intermittent every 12 hours  pantoprazole    Tablet 40 milliGRAM(s) Oral before breakfast  vancomycin  IVPB 500 milliGRAM(s) IV Intermittent every 24 hours    MEDICATIONS  (PRN):  dextrose Gel 1 Dose(s) Oral once PRN Blood Glucose LESS THAN 70 milliGRAM(s)/deciliter  glucagon  Injectable 1 milliGRAM(s) IntraMuscular once PRN Glucose LESS THAN 70 milligrams/deciliter  oxyCODONE    5 mG/acetaminophen 325 mG 1 Tablet(s) Oral every 6 hours PRN Severe Pain (7 - 10)  melatonin 3 milliGRAM(s) Oral at bedtime PRN Insomnia        CAPILLARY BLOOD GLUCOSE  143 (02 Oct 2017 08:39)  156 (01 Oct 2017 21:51)  184 (01 Oct 2017 16:26)  148 (01 Oct 2017 11:59)        I&O's Summary      PHYSICAL EXAM  GENERAL: NAD, well-developed  HEAD:  Atraumatic, Normocephalic  EYES: EOMI, PERRLA, conjunctiva and sclera clear  NECK: Supple, No JVD  CHEST/LUNG: Clear to auscultation bilaterally; No wheeze  HEART: Regular rate and rhythm; No murmurs, rubs, or gallops  ABDOMEN: Soft, Nontender, Nondistended; Bowel sounds present  EXTREMITIES:  2+ Peripheral Pulses, No clubbing, cyanosis, or edema,palpable axillary LN  PSYCH: AAOx3  SKIN: No rashes or lesions    LABS:                        8.9    4.10  )-----------( 226      ( 01 Oct 2017 06:15 )             26.7     10-01    138  |  97<L>  |  55<H>  ----------------------------<  163<H>  3.6   |  24  |  3.59<H>    Ca    11.7<H>      01 Oct 2017 06:10  Phos  4.7     10-01  Mg     1.9     10-01                RADIOLOGY & ADDITIONAL TESTS:    Imaging Personally Reviewed:  Consultant(s) Notes Reviewed:    Care Discussed with Consultants/Other Providers: Patient is a 47y old  Female who presents with a chief complaint of High calcium and my kidney sent by doctor (26 Sep 2017 16:12)        SUBJECTIVE / OVERNIGHT EVENTS:Pt has no acute complaints. No telemetry events       MEDICATIONS  (STANDING):  insulin lispro (HumaLOG) corrective regimen sliding scale   SubCutaneous three times a day before meals  insulin lispro (HumaLOG) corrective regimen sliding scale   SubCutaneous at bedtime  dextrose 5%. 1000 milliLiter(s) (50 mL/Hr) IV Continuous <Continuous>  dextrose 50% Injectable 12.5 Gram(s) IV Push once  dextrose 50% Injectable 25 Gram(s) IV Push once  dextrose 50% Injectable 25 Gram(s) IV Push once  influenza   Vaccine 0.5 milliLiter(s) IntraMuscular once  ampicillin/sulbactam  IVPB      heparin  Injectable 5000 Unit(s) SubCutaneous every 8 hours  ampicillin/sulbactam  IVPB 1.5 Gram(s) IV Intermittent every 12 hours  pantoprazole    Tablet 40 milliGRAM(s) Oral before breakfast  vancomycin  IVPB 500 milliGRAM(s) IV Intermittent every 24 hours    MEDICATIONS  (PRN):  dextrose Gel 1 Dose(s) Oral once PRN Blood Glucose LESS THAN 70 milliGRAM(s)/deciliter  glucagon  Injectable 1 milliGRAM(s) IntraMuscular once PRN Glucose LESS THAN 70 milligrams/deciliter  oxyCODONE    5 mG/acetaminophen 325 mG 1 Tablet(s) Oral every 6 hours PRN Severe Pain (7 - 10)  melatonin 3 milliGRAM(s) Oral at bedtime PRN Insomnia        CAPILLARY BLOOD GLUCOSE  143 (02 Oct 2017 08:39)  156 (01 Oct 2017 21:51)  184 (01 Oct 2017 16:26)  148 (01 Oct 2017 11:59)        I&O's Summary      PHYSICAL EXAM  GENERAL: NAD, well-developed  HEAD:  Atraumatic, Normocephalic  EYES: EOMI, PERRLA, conjunctiva and sclera clear  NECK: Supple, No JVD  CHEST/LUNG: Clear to auscultation bilaterally; No wheeze  HEART: Regular rate and rhythm; No murmurs, rubs, or gallops  ABDOMEN: Soft, Nontender, Nondistended; Bowel sounds present  EXTREMITIES:  2+ Peripheral Pulses, No clubbing, cyanosis, or edema,palpable axillary LN  PSYCH: AAOx3  SKIN: No rashes or lesions    LABS:                                     9.4    3.72  )-----------( 246      ( 02 Oct 2017 09:52 )             28.9                8.9    4.10  )-----------( 226      ( 01 Oct 2017 06:15 )             26.7     10-01    138  |  97<L>  |  55<H>  ----------------------------<  163<H>  3.6   |  24  |  3.59<H>    Ca    11.7<H>      01 Oct 2017 06:10  Phos  4.7     10-01  Mg     1.9     10-01                RADIOLOGY & ADDITIONAL TESTS:    Imaging Personally Reviewed:  Consultant(s) Notes Reviewed:  rheum, heme  Care Discussed with Consultants/Other Providers:

## 2017-10-02 NOTE — PROGRESS NOTE ADULT - PROBLEM SELECTOR PLAN 1
Stable at current level. As per rheum team pt's dyspnea improved with lasix so no acute need for steroids at this time    -Strict I/Os  - Flow cytometry and LN biopsy pending for lymphoma work up  -Will d/c tele

## 2017-10-02 NOTE — PROGRESS NOTE ADULT - PROBLEM SELECTOR PLAN 4
Stable and downtrending. W/u for myeloma entirely neg. 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   -Strict I/OS  -Hold nephrotoxins  -Daily BMP

## 2017-10-02 NOTE — PROGRESS NOTE ADULT - PROBLEM SELECTOR PLAN 7
A1C 1 month ago was 7.8; FS trended upward in setting of steroids. Now downtrending. s/p 1 units in 24 hrs.  -FS TID   -Diabetic diet  -Hold metformin   -Sliding scale. Will add basal insulin if steroids are a continuous factor

## 2017-10-02 NOTE — PROGRESS NOTE ADULT - PROBLEM SELECTOR PLAN 5
Normocytic/microcytic anemia likely 2/2 insufficient marrow production. Reticulocyte count showing no marrow response.   -Monitor CBC daily  -F/u Flow cytometry. Will touch base with heme  -Considering alternative diagnoses for pts presentation such as lymphoma

## 2017-10-02 NOTE — PROGRESS NOTE ADULT - PROBLEM SELECTOR PLAN 4
Normcytic/microcytic anemia likely 2/2 insufficient marrow production. Reticulocyte count showing no marrow response.   -Monitor CBC daily  -F/u Flow cytometry. Will touch base with heme  -Will consider alternative diagnoses for pts presentation such as lymphoma Normcytic/microcytic anemia likely 2/2 insufficient marrow production. Reticulocyte count showing no marrow response.   -Monitor CBC daily  -F/u Flow cytometry.

## 2017-10-03 ENCOUNTER — TRANSCRIPTION ENCOUNTER (OUTPATIENT)
Age: 47
End: 2017-10-03

## 2017-10-03 VITALS
RESPIRATION RATE: 17 BRPM | DIASTOLIC BLOOD PRESSURE: 64 MMHG | SYSTOLIC BLOOD PRESSURE: 98 MMHG | OXYGEN SATURATION: 100 % | HEART RATE: 77 BPM | TEMPERATURE: 98 F

## 2017-10-03 PROBLEM — D86.9 SARCOIDOSIS, UNSPECIFIED: Chronic | Status: ACTIVE | Noted: 2017-09-26

## 2017-10-03 PROBLEM — E11.9 TYPE 2 DIABETES MELLITUS WITHOUT COMPLICATIONS: Chronic | Status: ACTIVE | Noted: 2017-09-26

## 2017-10-03 LAB
BACTERIA BLD CULT: SIGNIFICANT CHANGE UP
BACTERIA BLD CULT: SIGNIFICANT CHANGE UP
BASOPHILS # BLD AUTO: 0.02 K/UL — SIGNIFICANT CHANGE UP (ref 0–0.2)
BASOPHILS NFR BLD AUTO: 0.5 % — SIGNIFICANT CHANGE UP (ref 0–2)
BUN SERPL-MCNC: 51 MG/DL — HIGH (ref 7–23)
CALCIUM SERPL-MCNC: 10.7 MG/DL — HIGH (ref 8.4–10.5)
CHLORIDE SERPL-SCNC: 102 MMOL/L — SIGNIFICANT CHANGE UP (ref 98–107)
CO2 SERPL-SCNC: 24 MMOL/L — SIGNIFICANT CHANGE UP (ref 22–31)
CREAT SERPL-MCNC: 3.48 MG/DL — HIGH (ref 0.5–1.3)
EOSINOPHIL # BLD AUTO: 0.71 K/UL — HIGH (ref 0–0.5)
EOSINOPHIL NFR BLD AUTO: 17.3 % — HIGH (ref 0–6)
GAS PNL BLDMV: SIGNIFICANT CHANGE UP
GLUCOSE SERPL-MCNC: 140 MG/DL — HIGH (ref 70–99)
HCT VFR BLD CALC: 28.1 % — LOW (ref 34.5–45)
HGB BLD-MCNC: 9.3 G/DL — LOW (ref 11.5–15.5)
IMM GRANULOCYTES # BLD AUTO: 0.03 # — SIGNIFICANT CHANGE UP
IMM GRANULOCYTES NFR BLD AUTO: 0.7 % — SIGNIFICANT CHANGE UP (ref 0–1.5)
LYMPHOCYTES # BLD AUTO: 0.82 K/UL — LOW (ref 1–3.3)
LYMPHOCYTES # BLD AUTO: 20 % — SIGNIFICANT CHANGE UP (ref 13–44)
MAGNESIUM SERPL-MCNC: 1.7 MG/DL — SIGNIFICANT CHANGE UP (ref 1.6–2.6)
MCHC RBC-ENTMCNC: 26 PG — LOW (ref 27–34)
MCHC RBC-ENTMCNC: 33.1 % — SIGNIFICANT CHANGE UP (ref 32–36)
MCV RBC AUTO: 78.5 FL — LOW (ref 80–100)
MONOCYTES # BLD AUTO: 0.54 K/UL — SIGNIFICANT CHANGE UP (ref 0–0.9)
MONOCYTES NFR BLD AUTO: 13.1 % — SIGNIFICANT CHANGE UP (ref 2–14)
NEUTROPHILS # BLD AUTO: 1.99 K/UL — SIGNIFICANT CHANGE UP (ref 1.8–7.4)
NEUTROPHILS NFR BLD AUTO: 48.4 % — SIGNIFICANT CHANGE UP (ref 43–77)
NON-GYNECOLOGICAL CYTOLOGY STUDY: SIGNIFICANT CHANGE UP
NRBC # FLD: 0 — SIGNIFICANT CHANGE UP
PHOSPHATE SERPL-MCNC: 4.4 MG/DL — SIGNIFICANT CHANGE UP (ref 2.5–4.5)
PLATELET # BLD AUTO: 241 K/UL — SIGNIFICANT CHANGE UP (ref 150–400)
PMV BLD: 8.8 FL — SIGNIFICANT CHANGE UP (ref 7–13)
POTASSIUM SERPL-MCNC: 4.4 MMOL/L — SIGNIFICANT CHANGE UP (ref 3.5–5.3)
POTASSIUM SERPL-SCNC: 4.4 MMOL/L — SIGNIFICANT CHANGE UP (ref 3.5–5.3)
RBC # BLD: 3.58 M/UL — LOW (ref 3.8–5.2)
RBC # FLD: 13.2 % — SIGNIFICANT CHANGE UP (ref 10.3–14.5)
SODIUM SERPL-SCNC: 141 MMOL/L — SIGNIFICANT CHANGE UP (ref 135–145)
TSH SERPL-MCNC: 2.07 UIU/ML — SIGNIFICANT CHANGE UP (ref 0.27–4.2)
WBC # BLD: 4.11 K/UL — SIGNIFICANT CHANGE UP (ref 3.8–10.5)
WBC # FLD AUTO: 4.11 K/UL — SIGNIFICANT CHANGE UP (ref 3.8–10.5)

## 2017-10-03 PROCEDURE — 99232 SBSQ HOSP IP/OBS MODERATE 35: CPT | Mod: GC

## 2017-10-03 PROCEDURE — 99239 HOSP IP/OBS DSCHRG MGMT >30: CPT

## 2017-10-03 RX ORDER — DOCUSATE SODIUM 100 MG
1 CAPSULE ORAL
Qty: 0 | Refills: 0 | COMMUNITY
Start: 2017-10-03

## 2017-10-03 RX ORDER — PANTOPRAZOLE SODIUM 20 MG/1
1 TABLET, DELAYED RELEASE ORAL
Qty: 30 | Refills: 0 | OUTPATIENT
Start: 2017-10-03 | End: 2017-11-02

## 2017-10-03 RX ORDER — DOCUSATE SODIUM 100 MG
1 CAPSULE ORAL
Qty: 90 | Refills: 0 | OUTPATIENT
Start: 2017-10-03 | End: 2017-11-02

## 2017-10-03 RX ORDER — CHOLECALCIFEROL (VITAMIN D3) 125 MCG
1 CAPSULE ORAL
Qty: 0 | Refills: 0 | COMMUNITY

## 2017-10-03 RX ORDER — METFORMIN HYDROCHLORIDE 850 MG/1
1 TABLET ORAL
Qty: 0 | Refills: 0 | COMMUNITY

## 2017-10-03 RX ORDER — SENNA PLUS 8.6 MG/1
2 TABLET ORAL
Qty: 0 | Refills: 0 | COMMUNITY
Start: 2017-10-03

## 2017-10-03 RX ORDER — SENNA PLUS 8.6 MG/1
2 TABLET ORAL
Qty: 60 | Refills: 0 | OUTPATIENT
Start: 2017-10-03 | End: 2017-11-02

## 2017-10-03 RX ORDER — PANTOPRAZOLE SODIUM 20 MG/1
1 TABLET, DELAYED RELEASE ORAL
Qty: 0 | Refills: 0 | COMMUNITY
Start: 2017-10-03

## 2017-10-03 RX ADMIN — OXYCODONE AND ACETAMINOPHEN 1 TABLET(S): 5; 325 TABLET ORAL at 07:00

## 2017-10-03 RX ADMIN — OXYCODONE AND ACETAMINOPHEN 1 TABLET(S): 5; 325 TABLET ORAL at 06:23

## 2017-10-03 RX ADMIN — AMPICILLIN SODIUM AND SULBACTAM SODIUM 100 GRAM(S): 250; 125 INJECTION, POWDER, FOR SUSPENSION INTRAMUSCULAR; INTRAVENOUS at 06:23

## 2017-10-03 RX ADMIN — Medication 100 MILLIGRAM(S): at 06:23

## 2017-10-03 RX ADMIN — Medication 100 MILLIGRAM(S): at 14:09

## 2017-10-03 RX ADMIN — PANTOPRAZOLE SODIUM 40 MILLIGRAM(S): 20 TABLET, DELAYED RELEASE ORAL at 06:23

## 2017-10-03 RX ADMIN — Medication 100 MILLIGRAM(S): at 14:08

## 2017-10-03 NOTE — PROGRESS NOTE ADULT - PROBLEM SELECTOR PLAN 4
Pt afebrile in last 24 hours. Blood cultures NGTD  -C/w Vanc and Unsayn to complete a 5 day course. Last day today.

## 2017-10-03 NOTE — PROGRESS NOTE ADULT - PROBLEM SELECTOR PLAN 2
Pt. with mild hyperkalemia in setting of renal failure. Serum potassium WNL (4.4) today. Low potassium diet. Monitor serum potassium

## 2017-10-03 NOTE — DISCHARGE NOTE ADULT - PATIENT PORTAL LINK FT
“You can access the [employer] Patient Portal, offered by [facility], by registering with the following website: [email or web address]”

## 2017-10-03 NOTE — PROGRESS NOTE ADULT - PROBLEM SELECTOR PLAN 1
Pt has been off treatment for over 3 years because she has not followed up with her pulmonologist  Rheum consult appreciated. Pt has hx of biopsy proven sarcoid in 2007 and LN Bx in 2014 showing sarcoid. EF 65% no evidence of sarcoid on TTE. ACE level elevated   -Hold off on steroids until we have definitive diagnosis  -Flow cytometry showing normal findings  -Pending final path from LN biopsy Pt has been off treatment for over 3 years because she has not followed up with her pulmonologist  Rheum consult appreciated. Pt has hx of biopsy proven sarcoid in 2007 and LN Bx in 2014 showing sarcoid. EF 65% no evidence of sarcoid on TTE. ACE level elevated. Path showing sarcoidosis  -Flow cytometry showing normal findings  -Will discuss steroids with rheumatology today

## 2017-10-03 NOTE — PROGRESS NOTE ADULT - SUBJECTIVE AND OBJECTIVE BOX
[facility] DIVISION OF KIDNEY DISEASES AND HYPERTENSION -- FOLLOW UP NOTE  --------------------------------------------------------------------------------  HPI: 47-year-old female with known history of sarcoidosis, DM presented to [facility] with complaints of weakness, back pain and LE pain. Pt. found to have MIKALA and hypercalcemia. Pt. received IVF with improvement in serum creatinine and calcium levels. Pt. seen and examined at bedside. Pt. feels better and denies chest pain, LE edema.  Pt. s/p left axillary LN biopsy on 9/28/17.        PAST HISTORY  --------------------------------------------------------------------------------  No significant changes to PMH, PSH, FHx, SHx, unless otherwise noted    ALLERGIES & MEDICATIONS  --------------------------------------------------------------------------------  Allergies    No Known Allergies    Intolerances      Standing Inpatient Medications  ampicillin/sulbactam  IVPB      ampicillin/sulbactam  IVPB 1.5 Gram(s) IV Intermittent every 12 hours  dextrose 5%. 1000 milliLiter(s) IV Continuous <Continuous>  dextrose 50% Injectable 12.5 Gram(s) IV Push once  dextrose 50% Injectable 25 Gram(s) IV Push once  dextrose 50% Injectable 25 Gram(s) IV Push once  docusate sodium 100 milliGRAM(s) Oral three times a day  heparin  Injectable 5000 Unit(s) SubCutaneous every 8 hours  influenza   Vaccine 0.5 milliLiter(s) IntraMuscular once  insulin lispro (HumaLOG) corrective regimen sliding scale   SubCutaneous three times a day before meals  insulin lispro (HumaLOG) corrective regimen sliding scale   SubCutaneous at bedtime  pantoprazole    Tablet 40 milliGRAM(s) Oral before breakfast  senna 2 Tablet(s) Oral at bedtime  vancomycin  IVPB 500 milliGRAM(s) IV Intermittent every 24 hours    PRN Inpatient Medications  dextrose Gel 1 Dose(s) Oral once PRN  glucagon  Injectable 1 milliGRAM(s) IntraMuscular once PRN  melatonin 3 milliGRAM(s) Oral at bedtime PRN  oxyCODONE    5 mG/acetaminophen 325 mG 1 Tablet(s) Oral every 6 hours PRN      REVIEW OF SYSTEMS  --------------------------------------------------------------------------------  CVS: no chest pain  RESP: + improved SOB  ABD: no abdominal pain  : no dysuria  MSK: no edema     All other systems were reviewed and are negative, except as noted.      VITALS/PHYSICAL EXAM  --------------------------------------------------------------------------------  T(C): 36.7 (10-03-17 @ 06:03), Max: 36.7 (10-02-17 @ 12:38)  HR: 83 (10-03-17 @ 06:03) (82 - 87)  BP: 120/70 (10-03-17 @ 06:03) (120/70 - 145/79)  RR: 17 (10-03-17 @ 06:03) (17 - 18)  SpO2: 99% (10-03-17 @ 06:03) (99% - 100%)  Wt(kg): --        Physical Exam:  	Gen: NAD  	Pulm: bilateral crackles heard   	CV: S1S2  	Abd: Soft, +BS   	Ext: No LE edema B/L  	Neuro: Awake, alert  	Skin: Warm and dry    LABS/STUDIES  --------------------------------------------------------------------------------              9.3    4.11  >-----------<  241      [10-03-17 @ 05:37]              28.1     141  |  102  |  51  ----------------------------<  140      [10-03-17 @ 05:37]  4.4   |  24  |  3.48        Ca     10.7     [10-03-17 @ 05:37]      Mg     1.7     [10-03-17 @ 05:37]      Phos  4.4     [10-03-17 @ 05:37]            Creatinine Trend:  SCr 3.48 [10-03 @ 05:37]  SCr 3.71 [10-02 @ 09:52]  SCr 3.59 [10-01 @ 06:10]  SCr 3.90 [09-30 @ 05:40]  SCr 3.75 [09-29 @ 06:30]    Urinalysis - [09-26-17 @ 15:02]      Color PLYEL / Appearance CLEAR / SG 1.010 / pH 6.5      Gluc 50 / Ketone NEGATIVE  / Bili NEGATIVE / Urobili NORMAL       Blood NEGATIVE / Protein 30 / Leuk Est NEGATIVE / Nitrite NEGATIVE      RBC 0-2 / WBC 0-2 / Hyaline  / Gran  / Sq Epi OCC / Non Sq Epi  / Bacteria       Iron 36, TIBC 202, %sat --      [09-27-17 @ 06:30]  Ferritin 112.8      [09-27-17 @ 06:30]  PTH 8.12 (Ca --)      [09-26-17 @ 07:35]   --  Vitamin D (25OH) 12.3      [09-26-17 @ 07:35]  HbA1c 6.9      [09-27-17 @ 06:30]    HBsAg Nonreactive      [09-30-17 @ 05:40]  HCV 0.13, Nonreactive Hepatitis C AB  S/CO Ratio                        Interpretation  < 1.0                                     Non-Reactive  1.0 - 4.9                           Weakly-Reactive  > 5.0                                 Reactive  Non-Reactive: Aperson with a non-reactive HCV antibody  result is considered uninfected.  No further action is  needed unless recent infection is suspected.  In these  cases, consider repeat testing later to detect  seroconversion..  Weakly-Reactive: HCV antibody test is abnormal, HCV RNA  Qualitative test will follow.  Reactive: HCV antibody test is abnormal, HCV RNA  Qualitative test will follow.  Note: HCV antibody testing is performed on the Abbott   system.      [09-30-17 @ 05:40]  HIV Nonreactive The HIV Ag/Ab Combo test performed screens for HIV-1 p24  antigen, antibodies to HIV-1 (group M and group O), and  antibodies to HIV-2. All specimens repeatedly reactive  will reflex to an HIV 1/2 antibody confirmation and  differentiation test. This assay detects p24 antigen which  may be present prior to the development of HIV antibodies,  therefore a reactive result with a negative HIV 1/2 AB  Confirmation should be followed up with HIV-1 RNA, HIV-2  RNA and repeat testing in 4-8 weeks. A nonreactive result  does not preclude previous exposure to or infection with  HIV-1 or HIV-2. [facility] prohibits disclosure of this  result to any unauthorized party.      [09-27-17 @ 06:30]    ANCA: cANCA Negative, pANCA Negative, atypical ANCA --      [09-26-17 @ 09:09]  PR3-ANCA <5.0, interpretation: --      [09-26-17 @ 09:09]  Free Light Chains: kappa 17.40, lambda 8.98, ratio = 1.94      [09-26 @ 09:09] [facility] DIVISION OF KIDNEY DISEASES AND HYPERTENSION -- FOLLOW UP NOTE  --------------------------------------------------------------------------------  HPI: 47-year-old female with known history of sarcoidosis, DM presented to [facility] with complaints of weakness, back pain and LE pain. Pt. found to have MIKALA and hypercalcemia. Pt. received IVF with improvement in serum creatinine and calcium levels. Pt. s/p left axillary LN biopsy on 9/28/17. Pt. seen and examined at bedside. Pt. feels better and denies chest pain, LE edema.       PAST HISTORY  --------------------------------------------------------------------------------  No significant changes to PMH, PSH, FHx, SHx, unless otherwise noted    ALLERGIES & MEDICATIONS  --------------------------------------------------------------------------------  Allergies    No Known Allergies    Intolerances    Standing Inpatient Medications  ampicillin/sulbactam  IVPB      ampicillin/sulbactam  IVPB 1.5 Gram(s) IV Intermittent every 12 hours  dextrose 5%. 1000 milliLiter(s) IV Continuous <Continuous>  dextrose 50% Injectable 12.5 Gram(s) IV Push once  dextrose 50% Injectable 25 Gram(s) IV Push once  dextrose 50% Injectable 25 Gram(s) IV Push once  docusate sodium 100 milliGRAM(s) Oral three times a day  heparin  Injectable 5000 Unit(s) SubCutaneous every 8 hours  influenza   Vaccine 0.5 milliLiter(s) IntraMuscular once  insulin lispro (HumaLOG) corrective regimen sliding scale   SubCutaneous three times a day before meals  insulin lispro (HumaLOG) corrective regimen sliding scale   SubCutaneous at bedtime  pantoprazole    Tablet 40 milliGRAM(s) Oral before breakfast  senna 2 Tablet(s) Oral at bedtime  vancomycin  IVPB 500 milliGRAM(s) IV Intermittent every 24 hours    PRN Inpatient Medications  dextrose Gel 1 Dose(s) Oral once PRN  glucagon  Injectable 1 milliGRAM(s) IntraMuscular once PRN  melatonin 3 milliGRAM(s) Oral at bedtime PRN  oxyCODONE    5 mG/acetaminophen 325 mG 1 Tablet(s) Oral every 6 hours PRN      REVIEW OF SYSTEMS  --------------------------------------------------------------------------------  CVS: no chest pain  RESP: + improved SOB  ABD: no abdominal pain  : no dysuria  MSK: no edema     All other systems were reviewed and are negative, except as noted.    VITALS/PHYSICAL EXAM  --------------------------------------------------------------------------------  T(C): 36.7 (10-03-17 @ 06:03), Max: 36.7 (10-02-17 @ 12:38)  HR: 83 (10-03-17 @ 06:03) (82 - 87)  BP: 120/70 (10-03-17 @ 06:03) (120/70 - 145/79)  RR: 17 (10-03-17 @ 06:03) (17 - 18)  SpO2: 99% (10-03-17 @ 06:03) (99% - 100%)  Wt(kg): --    Physical Exam:  	Gen: NAD  	Pulm: bilateral crackles heard   	CV: S1S2  	Abd: Soft, +BS   	Ext: No LE edema B/L  	Neuro: Awake, alert  	Skin: Warm and dry    LABS/STUDIES  --------------------------------------------------------------------------------              9.3    4.11  >-----------<  241      [10-03-17 @ 05:37]              28.1     141  |  102  |  51  ----------------------------<  140      [10-03-17 @ 05:37]  4.4   |  24  |  3.48        Ca     10.7     [10-03-17 @ 05:37]      Mg     1.7     [10-03-17 @ 05:37]      Phos  4.4     [10-03-17 @ 05:37]    Creatinine Trend:  SCr 3.48 [10-03 @ 05:37]  SCr 3.71 [10-02 @ 09:52]  SCr 3.59 [10-01 @ 06:10]  SCr 3.90 [09-30 @ 05:40]  SCr 3.75 [09-29 @ 06:30]    Urinalysis - [09-26-17 @ 15:02]      Color PLYEL / Appearance CLEAR / SG 1.010 / pH 6.5      Gluc 50 / Ketone NEGATIVE  / Bili NEGATIVE / Urobili NORMAL       Blood NEGATIVE / Protein 30 / Leuk Est NEGATIVE / Nitrite NEGATIVE      RBC 0-2 / WBC 0-2 / Hyaline  / Gran  / Sq Epi OCC / Non Sq Epi  / Bacteria       Iron 36, TIBC 202, %sat --      [09-27-17 @ 06:30]  Ferritin 112.8      [09-27-17 @ 06:30]  PTH 8.12 (Ca --)      [09-26-17 @ 07:35]   --  Vitamin D (25OH) 12.3      [09-26-17 @ 07:35]  HbA1c 6.9      [09-27-17 @ 06:30]    HBsAg Nonreactive      [09-30-17 @ 05:40]  HCV 0.13, Nonreactive Hepatitis C AB  S/CO Ratio                        Interpretation  < 1.0                                     Non-Reactive  1.0 - 4.9                           Weakly-Reactive  > 5.0                                 Reactive  Non-Reactive: Aperson with a non-reactive HCV antibody  result is considered uninfected.  No further action is  needed unless recent infection is suspected.  In these  cases, consider repeat testing later to detect  seroconversion..  Weakly-Reactive: HCV antibody test is abnormal, HCV RNA  Qualitative test will follow.  Reactive: HCV antibody test is abnormal, HCV RNA  Qualitative test will follow.  Note: HCV antibody testing is performed on the Abbott   system.      [09-30-17 @ 05:40]  HIV Nonreactive The HIV Ag/Ab Combo test performed screens for HIV-1 p24  antigen, antibodies to HIV-1 (group M and group O), and  antibodies to HIV-2. All specimens repeatedly reactive  will reflex to an HIV 1/2 antibody confirmation and  differentiation test. This assay detects p24 antigen which  may be present prior to the development of HIV antibodies,  therefore a reactive result with a negative HIV 1/2 AB  Confirmation should be followed up with HIV-1 RNA, HIV-2  RNA and repeat testing in 4-8 weeks. A nonreactive result  does not preclude previous exposure to or infection with  HIV-1 or HIV-2. [facility] prohibits disclosure of this  result to any unauthorized party.      [09-27-17 @ 06:30]    ANCA: cANCA Negative, pANCA Negative, atypical ANCA --      [09-26-17 @ 09:09]  PR3-ANCA <5.0, interpretation: --      [09-26-17 @ 09:09]  Free Light Chains: kappa 17.40, lambda 8.98, ratio = 1.94      [09-26 @ 09:09]

## 2017-10-03 NOTE — PROGRESS NOTE ADULT - PROBLEM SELECTOR PLAN 3
Stable. Cr downtrending. W/u for myeloma entirely neg. 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

## 2017-10-03 NOTE — PROGRESS NOTE ADULT - PROBLEM SELECTOR PLAN 6
A1C 1 month ago was 7.8; FS trending upward in setting of steroids. Pt has elevated sugars after 1 dose of prednisone.  -FS TID   -Diabetic diet  -Hold metformin   -Sliding scale. A1C 1 month ago was 7.8; FS trending upward in setting of steroids. Pt has elevated sugars after 1 dose of prednisone. s/p 2 u in 24 hr.  -FS TID   -Diabetic diet  -Hold metformin   -Sliding scale.

## 2017-10-03 NOTE — DISCHARGE NOTE ADULT - PLAN OF CARE
Improved SOB You came into the hospital with difficulty breathing. You were found to have sarcoidosis throughout the body. You were started on treatment for your sarcoidosis. Please follow up with the tr You were found to have an elevated calcium. You're calcium was elevated because of your sarcoidosis. Please continue to take your medications as prescribed, Please follow up with your PCP. You were found to have abnormal kidney function. Your kidneys were damaged from using NSAIDs like Aleve and Ibuprofen. Please avoid taking NSAIDs in the future. Please follow up with Dr. Garibay for management of your kidney disease . You came into the hospital with difficulty breathing. You were found to have sarcoidosis throughout the body. You were started on treatment for your sarcoidosis. Please follow up with the rheumatology clinic for management of your disease. You have a history of diabetes. You're diabetes was controlled in the hospital. Please stop taking metformin when you go home.

## 2017-10-03 NOTE — DISCHARGE NOTE ADULT - PROVIDER TOKENS
TOKEN:'[id number]' TOKEN:'[id number]',TOKEN:'[id number]' TOKEN:'[id number]:'[id number]:'[id number]',FREE:[LAST:[Brenton],FIRST:[Hernando],PHONE:[phone]:[(   [phone]    -],ADDRESS:[[address] [place] [address]]

## 2017-10-03 NOTE — PROGRESS NOTE ADULT - SUBJECTIVE AND OBJECTIVE BOX
Patient is a 47y old  Female who presents with a chief complaint of High calcium and my kidney sent by doctor (26 Sep 2017 16:12)        SUBJECTIVE / OVERNIGHT EVENTS:Pt has no acute complaints. No telemetry events       MEDICATIONS  (STANDING):  insulin lispro (HumaLOG) corrective regimen sliding scale   SubCutaneous three times a day before meals  insulin lispro (HumaLOG) corrective regimen sliding scale   SubCutaneous at bedtime  dextrose 5%. 1000 milliLiter(s) (50 mL/Hr) IV Continuous <Continuous>  dextrose 50% Injectable 12.5 Gram(s) IV Push once  dextrose 50% Injectable 25 Gram(s) IV Push once  dextrose 50% Injectable 25 Gram(s) IV Push once  influenza   Vaccine 0.5 milliLiter(s) IntraMuscular once  ampicillin/sulbactam  IVPB      heparin  Injectable 5000 Unit(s) SubCutaneous every 8 hours  ampicillin/sulbactam  IVPB 1.5 Gram(s) IV Intermittent every 12 hours  pantoprazole    Tablet 40 milliGRAM(s) Oral before breakfast  vancomycin  IVPB 500 milliGRAM(s) IV Intermittent every 24 hours    MEDICATIONS  (PRN):  dextrose Gel 1 Dose(s) Oral once PRN Blood Glucose LESS THAN 70 milliGRAM(s)/deciliter  glucagon  Injectable 1 milliGRAM(s) IntraMuscular once PRN Glucose LESS THAN 70 milligrams/deciliter  oxyCODONE    5 mG/acetaminophen 325 mG 1 Tablet(s) Oral every 6 hours PRN Severe Pain (7 - 10)  melatonin 3 milliGRAM(s) Oral at bedtime PRN Insomnia        CAPILLARY BLOOD GLUCOSE        I&O's Summary      PHYSICAL EXAM  GENERAL: NAD, well-developed  HEAD:  Atraumatic, Normocephalic  EYES: EOMI, PERRLA, conjunctiva and sclera clear  NECK: Supple, No JVD  CHEST/LUNG: Clear to auscultation bilaterally; No wheeze  HEART: Regular rate and rhythm; No murmurs, rubs, or gallops  ABDOMEN: Soft, Nontender, Nondistended; Bowel sounds present  EXTREMITIES:  2+ Peripheral Pulses, No clubbing, cyanosis, or edema,palpable axillary LN  PSYCH: AAOx3  SKIN: No rashes or lesions    LABS:                                          RADIOLOGY & ADDITIONAL TESTS:    Imaging Personally Reviewed:  Consultant(s) Notes Reviewed:  rheum, heme, pulm  Care Discussed with Consultants/Other Providers: Patient is a 47y old  Female who presents with a chief complaint of High calcium and my kidney sent by doctor (26 Sep 2017 16:12)        SUBJECTIVE / OVERNIGHT EVENTS:Pt has no acute complaints. No telemetry events       MEDICATIONS  (STANDING):  insulin lispro (HumaLOG) corrective regimen sliding scale   SubCutaneous three times a day before meals  insulin lispro (HumaLOG) corrective regimen sliding scale   SubCutaneous at bedtime  dextrose 5%. 1000 milliLiter(s) (50 mL/Hr) IV Continuous <Continuous>  dextrose 50% Injectable 12.5 Gram(s) IV Push once  dextrose 50% Injectable 25 Gram(s) IV Push once  dextrose 50% Injectable 25 Gram(s) IV Push once  influenza   Vaccine 0.5 milliLiter(s) IntraMuscular once  ampicillin/sulbactam  IVPB      heparin  Injectable 5000 Unit(s) SubCutaneous every 8 hours  ampicillin/sulbactam  IVPB 1.5 Gram(s) IV Intermittent every 12 hours  pantoprazole    Tablet 40 milliGRAM(s) Oral before breakfast  vancomycin  IVPB 500 milliGRAM(s) IV Intermittent every 24 hours    MEDICATIONS  (PRN):  dextrose Gel 1 Dose(s) Oral once PRN Blood Glucose LESS THAN 70 milliGRAM(s)/deciliter  glucagon  Injectable 1 milliGRAM(s) IntraMuscular once PRN Glucose LESS THAN 70 milligrams/deciliter  oxyCODONE    5 mG/acetaminophen 325 mG 1 Tablet(s) Oral every 6 hours PRN Severe Pain (7 - 10)  melatonin 3 milliGRAM(s) Oral at bedtime PRN Insomnia        CAPILLARY BLOOD GLUCOSE        I&O's Summary      PHYSICAL EXAM  GENERAL: NAD, well-developed  HEAD:  Atraumatic, Normocephalic  EYES: EOMI, PERRLA, conjunctiva and sclera clear  NECK: Supple, No JVD  CHEST/LUNG: Clear to auscultation bilaterally; No wheeze  HEART: Regular rate and rhythm; No murmurs, rubs, or gallops  ABDOMEN: Soft, Nontender, Nondistended; Bowel sounds present  EXTREMITIES:  2+ Peripheral Pulses, No clubbing, cyanosis, or edema,palpable axillary LN  PSYCH: AAOx3  SKIN: No rashes or lesions    LABS:    10-03    141  |  102  |  51<H>  ----------------------------<  140<H>  4.4   |  24  |  3.48<H>    Ca    10.7<H>      03 Oct 2017 05:37  Phos  4.4     10-03  Mg     1.7     10-03                                              RADIOLOGY & ADDITIONAL TESTS:    Imaging Personally Reviewed:  Consultant(s) Notes Reviewed:  rheum, heme, pulm  Care Discussed with Consultants/Other Providers:

## 2017-10-03 NOTE — PROGRESS NOTE ADULT - SUBJECTIVE AND OBJECTIVE BOX
Patient is a 47y old  Female who presents with a chief complaint of High calcium and my kidney sent by doctor (26 Sep 2017 16:12)        SUBJECTIVE / OVERNIGHT EVENTS:    Pt reports sudden pain in knee overnight, oxycodone was given at 6am, which alleviated her pain. This morning, she has no complaints. Denies CP, SOB, n/v/c/d, dysuria.      MEDICATIONS  (STANDING):  insulin lispro (HumaLOG) corrective regimen sliding scale   SubCutaneous three times a day before meals  insulin lispro (HumaLOG) corrective regimen sliding scale   SubCutaneous at bedtime  dextrose 5%. 1000 milliLiter(s) (50 mL/Hr) IV Continuous <Continuous>  dextrose 50% Injectable 12.5 Gram(s) IV Push once  dextrose 50% Injectable 25 Gram(s) IV Push once  dextrose 50% Injectable 25 Gram(s) IV Push once  influenza   Vaccine 0.5 milliLiter(s) IntraMuscular once  ampicillin/sulbactam  IVPB      heparin  Injectable 5000 Unit(s) SubCutaneous every 8 hours  ampicillin/sulbactam  IVPB 1.5 Gram(s) IV Intermittent every 12 hours  pantoprazole    Tablet 40 milliGRAM(s) Oral before breakfast  vancomycin  IVPB 500 milliGRAM(s) IV Intermittent every 24 hours    MEDICATIONS  (PRN):  dextrose Gel 1 Dose(s) Oral once PRN Blood Glucose LESS THAN 70 milliGRAM(s)/deciliter  glucagon  Injectable 1 milliGRAM(s) IntraMuscular once PRN Glucose LESS THAN 70 milligrams/deciliter  oxyCODONE    5 mG/acetaminophen 325 mG 1 Tablet(s) Oral every 6 hours PRN Severe Pain (7 - 10)  melatonin 3 milliGRAM(s) Oral at bedtime PRN Insomnia        CAPILLARY BLOOD GLUCOSE        I&O's Summary      PHYSICAL EXAM  GENERAL: NAD, well-developed  HEAD:  Atraumatic, Normocephalic  EYES: EOMI, PERRLA, conjunctiva and sclera clear  NECK: Supple, No JVD  CHEST/LUNG: Clear to auscultation bilaterally; No wheeze  HEART: Regular rate and rhythm; Mild systolic murmur. No rubs or gallops  ABDOMEN: Soft, Nontender, Nondistended; Bowel sounds present  EXTREMITIES:  2+ Peripheral Pulses, No clubbing, cyanosis, or edema  LYMPH: palpable axillary, anterior and posterior cervical, submandibular LN  PSYCH: AAOx3  SKIN: No new rashes or lesions    LABS:                                          RADIOLOGY & ADDITIONAL TESTS:    Imaging Personally Reviewed:  Consultant(s) Notes Reviewed:  rheum, heme, pulm    Flow cytometry:  Peripheral blood: The lymphocyte immunophenotypic findings show no diagnostic abnormalities.

## 2017-10-03 NOTE — PROGRESS NOTE ADULT - PROBLEM SELECTOR PLAN 1
Pt. with most likely hemodynamically mediated MIKALA in setting of hypercalcemia and recent NSAID use. Scr decreased to 3.48 today from 3.7 yesterday. Patient reports voiding well.  Monitor labs and urine output. Avoid nephrotoxins, NSAIDs, and RCA

## 2017-10-03 NOTE — PROGRESS NOTE ADULT - PROBLEM SELECTOR PLAN 2
Stable at current level. As per rheum team pt's dyspnea improved with lasix so no acute need for steroids at this time    -Strict I/Os  - Flow cytometry and LN biopsy pending for lymphoma work up  -Will d/c tele Stable at current level. Likely 2/2 sarcoid given biopsy findings  -Strict I/Os  -Will d/c tele

## 2017-10-03 NOTE — PROGRESS NOTE ADULT - PROBLEM SELECTOR PLAN 6
A1C 1 month ago was 7.8; FS trending upward in setting of steroids. Pt has elevated sugars after 1 dose of prednisone. s/p 5 units   -FS TID   -Diabetic diet  -Hold metformin   -Sliding scale. Possible 2/2 sarcoid. ANCAs neg no other evidence of vasculitis clinically. Low suspicion for parasitic infection vs persistent allergen    -Trend CBC

## 2017-10-03 NOTE — DISCHARGE NOTE ADULT - CARE PLAN
Principal Discharge DX:	Sarcoidosis  Goal:	Improved SOB  Instructions for follow-up, activity and diet:	You came into the hospital with difficulty breathing. You were found to have sarcoidosis throughout the body. You were started on treatment for your sarcoidosis. Please follow up with the tr  Secondary Diagnosis:	Hypercalcemia  Instructions for follow-up, activity and diet:	You were found to have an elevated calcium. You're calcium was elevated because of your sarcoidosis. Please continue to take your medications as prescribed, Please follow up with your PCP.  Secondary Diagnosis:	MIKALA (acute kidney injury)  Instructions for follow-up, activity and diet:	You were found to have abnormal kidney function. Your kidneys were damaged from using NSAIDs like Aleve and Ibuprofen. Please avoid taking NSAIDs in the future. Please follow up with Dr. Garibay for management of your kidney disease . Principal Discharge DX:	Sarcoidosis  Goal:	Improved SOB  Instructions for follow-up, activity and diet:	You came into the hospital with difficulty breathing. You were found to have sarcoidosis throughout the body. You were started on treatment for your sarcoidosis. Please follow up with the rheumatology clinic for management of your disease.  Secondary Diagnosis:	Hypercalcemia  Instructions for follow-up, activity and diet:	You were found to have an elevated calcium. You're calcium was elevated because of your sarcoidosis. Please continue to take your medications as prescribed, Please follow up with your PCP.  Secondary Diagnosis:	MIKALA (acute kidney injury)  Instructions for follow-up, activity and diet:	You were found to have abnormal kidney function. Your kidneys were damaged from using NSAIDs like Aleve and Ibuprofen. Please avoid taking NSAIDs in the future. Please follow up with Dr. Garibay for management of your kidney disease . Principal Discharge DX:	Sarcoidosis  Goal:	Improved SOB  Instructions for follow-up, activity and diet:	You came into the hospital with difficulty breathing. You were found to have sarcoidosis throughout the body. You were started on treatment for your sarcoidosis. Please follow up with the rheumatology clinic for management of your disease.  Secondary Diagnosis:	Hypercalcemia  Instructions for follow-up, activity and diet:	You were found to have an elevated calcium. You're calcium was elevated because of your sarcoidosis. Please continue to take your medications as prescribed, Please follow up with your PCP.  Secondary Diagnosis:	MIKALA (acute kidney injury)  Instructions for follow-up, activity and diet:	You were found to have abnormal kidney function. Your kidneys were damaged from using NSAIDs like Aleve and Ibuprofen. Please avoid taking NSAIDs in the future. Please follow up with Dr. Gairbay for management of your kidney disease .  Secondary Diagnosis:	Type 2 diabetes mellitus without complication, without long-term current use of insulin  Instructions for follow-up, activity and diet:	You have a history of diabetes. You're diabetes was controlled in the hospital. Please stop taking metformin when you go home.

## 2017-10-03 NOTE — PROGRESS NOTE ADULT - PROBLEM SELECTOR PLAN 1
Pt has been off treatment for over 3 years because she has not followed up with her pulmonologist  Rheum consult appreciated. Pt has hx of biopsy proven sarcoid in 2007 and LN Bx in 2014 showing sarcoid.TTE: EF 65% no evidence of sarcoid. ACE level elevated   -Hold off on steroids until LN biopsy returns with definitive diagnosis  -Flow cytometry showing normal findings  -Will f/u with LN biopsy path

## 2017-10-03 NOTE — PROGRESS NOTE ADULT - PROBLEM SELECTOR PLAN 3
Pt. with hypercalcemia in setting of sarcoidosis/lymphadenopathy. Serum calcium improved to 10.7 today. IV fluids were discontinued. Pt. received a dose of oral prednisone on 9/29. Monitor serum calcium

## 2017-10-03 NOTE — DISCHARGE NOTE ADULT - CARE PROVIDERS DIRECT ADDRESSES
,[email or web address] ,[email or web address],DirectAddress_Unknown,DirectAddress_Unknown

## 2017-10-03 NOTE — PROGRESS NOTE ADULT - ATTENDING COMMENTS
48 y/o F w/sarcoidosis previously untreated and RML and lingular bronchiectasis presenting with new onset fevers, hypercalcemia and MIKALA. Unclear if sarcoidosis is the etiology for Faith of findings vs potential underlying malignancy.  PTHrP is WNL, and vit D 1,25 is elevated with low Vitamin D level which would be consistent with hypercalcemia from granulomatous disease, and despite elevated paraproteins no monoclonal spike seen on SPEP or UPEP. Currently awaiting biopsy pathology prior to initiation steroids for treatment of sarcoid. BNP significantly elevated and some B lines seen on lung ultrasounds which could be consistent with volume overload.    - Continue diuresis goal net negative 0-500 cc  - Can continue broad spectrum antibiotics  - Can give trial of ICS and albuterol PRN  - monitor cr
48 y/o F w/sarcoidosis previously untreated and RML and lingular bronchiectasis presenting with new onset fevers, hypercalcemia and MIKALA. Unclear if sarcoidosis is the etiology for Sabianism of findings vs potential underlying malignancy.  PTHrP is WNL, and vit D 1,25 is elevated with low Vitamin D level which would be consistent with hypercalcemia from granulomatous disease, and despite elevated paraproteins no monoclonal spike seen on SPEP or UPEP. Biopsy path showing granulomas, no evidence of malignancy. Recommend starting prednisone.. BNP significantly elevated and some B lines seen on lung ultrasounds which could be consistent with volume overload, however BNP is drastically elevated for clinical condition, possible lab error?.    - Prednisone as per rheum  - Repeat BNP, please check TSH  - Can give trial of ICS and albuterol PRN
patient seen and examined by me personally w fellow and resident   agree with detailed assessment and plan as above    will initiate steroids once w/u is complete - if she acutely becomes worse can start prior to this   will follow w you
patient seen and examined by me personally with fellow and resident  recent events noted    agree with above assessment and plan  acute worsening of SOB being evaluated by Pulm.  Would consider tx w/ steroids if response to diuresis is minimal    will continue to follow with you  results of bx pending   patient is aware of our assessment and plan
patient seen and examined by me personally with fellow and rotating physician  events of recent hospital course reviewed  most likely the LAD and sx are related to her known sarcoid but we need to r/o underlying other granulomatous disease as her sarcoid was tx and dormant for nearly a decade, and this sudden abrupt change is not a typical sarcoid presentation.  r/o malignancy vs infection    agree with above assessment and plan in excellent note by resident   patient is aware of our assessment and plan
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   Workup sent for MM  Surgery called for possible biopsy  DW Rheem- Await recos re- steroids
Pt seen and examined by  me Agree with resident  1) MIKALA likely  ATN secondary to NSAID nephropathy vs hypercalcemia. Creatinine slowly improving  2) Non Anion Gap Metabolic Acidosis secondary to MIKALA  2) Bulky adenopathy  likely secondary to sarcoidosis ,r/o  leukemia .DW IR- plan for core biopsy   Discussed with Hematology - they will assess pt in AM  3) FUO - pt spiked a fever, resolved spontaneously. As per pt, she has been having chills with subjective fevers since 1-2 months   Could be secondary to underlying rheumatological process- will order infectious work up- BC/UA/RVP. UC - NGTD Pt had recent CT chest with no evidence of PNA. Will continue to monitor off Abx for now   4) Hypercalcemia likely secondary to sacroidosis ( elevated 125 OH Vitamin D). Await Rheum recos- Steroids  5) Dispo- Await IR core biopsy today
Pt seen and examined with HS on above date.  Agree with above HS note.  Plan discussed with House staff.
Pt seen and examined by me Agree with resident.   Pt doing much better, breathing back to baseline. Pt off supplemental oxygen and saturating well.   1) Hypoxic respiratory failure likely secondary to fluid overload- Resolved with Lasix 40 mg IV X1   2) Sepsis concerning for gram negative PNA- BC-NGTD, Afebrile x 48 hours . On  Vanc/Unasyn day 3/5 .Urine for Legionella- negative   3) Hypercalcemia likely secondary to Sarcoidosis - Recd Prednisone x 1 .DW Rheum - to start definitive treatment post Biopsy results  4) MIKALA likely ATN from NSAID nephropathy vs hypercalcemia- improving. IVF Dced. Encourage PO intake. Renal on board  5) Bulky Lpathy concerning for CLL vs Sarcoidosis- s/p IR guided Bx  day - Await path. Hematology on board  6) Sarcoidosis  with evidence of pulmonary sarcoidosis - Pulmonology/ Rheum on board.Await LN biopsy   7) DVT prox- HSQ .  8) Dispo - Await definitive treatment for Sarcoidosis/Hypercalcemia pending LN biopsy results. DC planning pending path
Pt seen and examined with HS on above date.  Agree with above HS note.  Plan discussed with House staff.    Pt with sarcoid on bx.  will discuss with heme/rheum for steroid initiation.  stable fo dc with o/p f/u with renal, pulm, rheum, pmd.  dc time 45 minutes
Pt seen and examined by me Agree with resident. Noted events from overnight - pt developed worsening cough with pinky sputumx 1 . pt was also hypoxic to the 80s and was placed on NRB. VQ scan was attempted but pt was not able to tolerate it as she could not lie flat  1) Hypoxic respiratory failure likely secondary to fluid overload- suspect pt has underlying HF . Pt recd close tp 7 litres in 3 day for the MIKALA.  Will given Lasix 40 mg x 1 and assess response.Check TTE/ABG   Other concern is PNA given presence of fever or progression of pulmonary sarcoidosis  2) Sepsis concerning for gram negative PNA- pt spike 101 yday. BC-pending.   Will start Vanc/Unasyn. Check sputum for gram stain./Urine for Legionella  3) Hypercalcemia likely secondary to Sarcoidosis - Pt receiving Lasix today. assess response to Lasix  4) MIKALA likely ATN from NSAID nephropathy vs hypercalcemia- improving. IVF Dced. Encourage PO intake. Renal on board  5) Bulky Lpathy concerning for CLL vs Sarcoidosis- s/p IR guided Bx  day - Await path. Hematology on board  6) Sarcoidosis  with evidence of pulmonary sarcoidosis - Pulmonology called. Rheum on board
Pt seen and examined by me Agree with resident.   Pt doing much better, breathing back to baseline. Pt off supplemental oxygen and saturating well.     1) Hypoxic respiratory failure likely secondary to fluid overload- suspect pt has underlying HF .improving, Recd Lasix 40mg IVx 1 yday with good diuresis.continue to monitor. Lasix PRN  2) Sepsis concerning for gram negative PNA- BC-pending. On  Vanc/Unasyn. Check sputum for gram stain./Urine for Legionella  3) Hypercalcemia likely secondary to Sarcoidosis - Recd Prednisone x 1 yday,   4) MIKALA likely ATN from NSAID nephropathy vs hypercalcemia- improving. IVF Dced. Encourage PO intake. Renal on board  5) Bulky Lpathy concerning for CLL vs Sarcoidosis- s/p IR guided Bx  day - Await path. Hematology on board  6) Sarcoidosis  with evidence of pulmonary sarcoidosis - Pulmonology called. Rheum on board .  7) DVT prox- HSQ

## 2017-10-03 NOTE — PROGRESS NOTE ADULT - ASSESSMENT
47yoF hx of sarcoidosis (dx 2010 via biopsy showing noncaseating granulomas; no treatment since ~2014), T2DM, sent in from outside rheumatologist for abnormal labs showing MIKALA and hypercalcemia, in setting of subacute-to-acute sx/sx including weight loss, night sweats, dyspnea on exertion, and lower-extremity pain; diff dx includes sarcoid vs hematologic malignancy, s/p axillary lymph-node Core Biopsy 9/28, s/p prednisone 50mg PO x1 (9/29). Course c/b fevers (last fever 9/29 PM, SOB & hemoptysis on 9/28; being treated w vancomycin (9/29--> ) and Unasyn (9/29--> ).     PLAN -- to be discussed and finalized w fellow and attending  Once biopsy is returned, will start prednsione 50mg PO daily if clinically appropriate.   Given history of sarcoid, recommend cardiac MRI, can be done inpatient or outpatient  Given history of sarcoid, recommend ophthalmologic evaluation as outpatient 47yoF hx of sarcoidosis (dx 2010 via biopsy showing noncaseating granulomas; no treatment since ~2014), T2DM, sent in from outside rheumatologist for abnormal labs showing MIKALA and hypercalcemia, in setting of subacute-to-acute sx/sx including weight loss, night sweats, dyspnea on exertion, and lower-extremity pain; diff dx includes sarcoid vs hematologic malignancy, s/p axillary lymph-node Core Biopsy 9/28, s/p prednisone 50mg PO x1 (9/29). Course c/b fevers (last fever 9/29 PM, SOB & hemoptysis on 9/28; being treated w vancomycin (9/29--> ) and Unasyn (9/29--> ).     PLAN -- to be discussed and finalized w fellow and attending  Outpatient steroid regimen recommendation to be finalized  Patient wants to switch outpatient rheumatology follow-up to [facility]; accordingly, please advise/help her to make an appointment at [address] [place] [address], [address], [place] [address], tel [phone], ideally with attending Dr. Frances Jackson  Given history of sarcoid, recommend cardiac MRI, can be done inpatient or outpatient  Given history of sarcoid, recommend ophthalmologic evaluation as outpatient 47yoF hx of sarcoidosis (dx 2010 via biopsy showing noncaseating granulomas; no treatment since ~2014), T2DM, sent in from outside rheumatologist for abnormal labs showing MIKALA and hypercalcemia, in setting of subacute-to-acute sx/sx including weight loss, night sweats, dyspnea on exertion, and lower-extremity pain; diff dx includes sarcoid vs hematologic malignancy, s/p axillary lymph-node Core Biopsy 9/28, s/p prednisone 50mg PO x1 (9/29). Course c/b fevers (last fever 9/29 PM, SOB & hemoptysis on 9/28; being treated w vancomycin (9/29--> ) and Unasyn (9/29--> ).     PLAN   prednisone 50mg daily   to be d/c today   follow up with Dr. Ospina in 3 weeks (our office to arrange)   Please call if any further questions.   Given history of sarcoid, recommend cardiac MRI, can be done inpatient or outpatient  Given history of sarcoid, recommend ophthalmologic evaluation as outpatient

## 2017-10-03 NOTE — PROGRESS NOTE ADULT - NSHPATTENDINGPLANDISCUSS_GEN_ALL_CORE
medicine resident
Dr. Phillips
medicine
medicine, renal;
patient
patient and rheumatology team
patient
patient and medical team

## 2017-10-03 NOTE — PROGRESS NOTE ADULT - PROBLEM SELECTOR PLAN 5
Normcytic/microcytic anemia likely 2/2 insufficient marrow production. Reticulocyte count showing no marrow response.   -Monitor CBC daily  -F/u Flow cytometry. A1C 1 month ago was 7.8; FS trending upward in setting of steroids. Pt has elevated sugars after 1 dose of prednisone. s/p 5 units   -FS TID   -Diabetic diet  -Hold metformin   -Sliding scale.

## 2017-10-03 NOTE — PROGRESS NOTE ADULT - PROBLEM SELECTOR PLAN 2
Stable at current level. As per rheum team pt's dyspnea improved with lasix so no acute need for steroids at this time    -Strict I/Os  -Flow cytometry did not demonstrate lymphocyte immunophenotypic findings c/w diagnostic abnormalities.  -LN biopsy pending for lymphoma work up

## 2017-10-03 NOTE — PROGRESS NOTE ADULT - PROBLEM SELECTOR PLAN 4
Pt afebrile in last 24 hours. Blood cultures NGTD  -c/w Vanc and Unsayn to compelte a 5 day course. Normcytic/microcytic anemia likely 2/2 insufficient marrow production. Reticulocyte count showing no marrow response.   -Monitor CBC daily  -F/u Flow cytometry.

## 2017-10-03 NOTE — PROGRESS NOTE ADULT - PROVIDER SPECIALTY LIST ADULT
Internal Medicine
Nephrology
Pulmonology
Pulmonology
Rheumatology
Nephrology
Rheumatology
Internal Medicine

## 2017-10-03 NOTE — DISCHARGE NOTE ADULT - MEDICATION SUMMARY - MEDICATIONS TO TAKE
I will START or STAY ON the medications listed below when I get home from the hospital:    predniSONE 50 mg oral tablet  -- 1 tab(s) by mouth once a day   -- It is very important that you take or use this exactly as directed.  Do not skip doses or discontinue unless directed by your doctor.  Obtain medical advice before taking any non-prescription drugs as some may affect the action of this medication.  Take with food or milk.    -- Indication: For Sarcoidosis    docusate sodium 100 mg oral capsule  -- 1 cap(s) by mouth 3 times a day  -- Indication: For Constipation    senna oral tablet  -- 2 tab(s) by mouth once a day (at bedtime)  -- Indication: For Constipation    pantoprazole 40 mg oral delayed release tablet  -- 1 tab(s) by mouth once a day (before a meal)  -- Indication: For GERD

## 2017-10-03 NOTE — DISCHARGE NOTE ADULT - SECONDARY DIAGNOSIS.
Hypercalcemia MIKALA (acute kidney injury) Type 2 diabetes mellitus without complication, without long-term current use of insulin

## 2017-10-03 NOTE — PROGRESS NOTE ADULT - PROBLEM SELECTOR PLAN 5
Normocytic/microcytic anemia likely 2/2 insufficient marrow production. Reticulocyte count showing no marrow response. Flow cytometry revealed no lymphocytic immunophenotypic findings.  -Monitor CBC daily

## 2017-10-03 NOTE — DISCHARGE NOTE ADULT - CARE PROVIDER_API CALL
Mariaelena Ware), Internal Medicine; Rheumatology  [address] [address]  Phone: [phone]  Fax: [phone] Mariaelena Ware), Internal Medicine; Rheumatology  [address] [facility]  [address]  [place] [address]  Phone: [phone]  Fax: [phone]    Jose M Garibay), Nephrology  [address] [place] [address]  [address] [place] [address]  Phone: [phone]  Fax: [phone] Mariaelena Ware), Internal Medicine; Rheumatology  [address] [facility] [address]  [place] [address]  Phone: [phone]  Fax: [phone]    Jose M Garibay), Nephrology  [address] [facility]  [address]nd [place] [address]  Phone: [phone]  Fax: [phone]    Jacinto Adams), Internal Medicine; Pulmonary Disease  [address] [place] [address]  Phone: [phone]  Fax: [phone]    Hernando Farris  [address] [facility]  Phone: [phone]  Fax: (   )    -

## 2017-10-03 NOTE — DISCHARGE NOTE ADULT - HOSPITAL COURSE
48 yo F w PMH sarcoidosis (untreated for past 3 years), DM2, and nephrolithiasis, p/w high Ca and increasing Cr. PT wad admitted to medical floors and started on IV fluids for elevated calcium. Pt had CT chest abdomen and pevlis which showed bulky adenopathy  Work up revealed elevated ACE levels and Vit D 1,25 c/w sarcoid diagnosis. Given patients ARF,anemia, and hypercalcemia pt had myeloma w/u which was unrevealing. Pt had LN biopsy which showed non caseating granulomas. 48 yo F w PMH sarcoidosis (untreated for past 3 years), DM2, and nephrolithiasis, p/w high Ca and increasing Cr. PT wad admitted to medical floors and started on IV fluids for elevated calcium. Pt had CT chest abdomen and pevlis which showed bulky adenopathy. TTE was grossly normal.  Work up revealed elevated ACE levels and Vit D 1,25 c/w sarcoid diagnosis. Given patients ARF,anemia, and hypercalcemia pt had myeloma w/u which was unrevealing. Pt had LN biopsy which showed non caseating granulomas which effectively r/o lymphoma. While admitted patient had episode of dyspnea and hypoxia that improved with IV lasix.  Pt was started on prednisone 50 mg by rheumatology. Pt's renal failure improved and patient is planned for outpatient rheumatology f/ u.

## 2017-10-03 NOTE — PROGRESS NOTE ADULT - ASSESSMENT
48 yo F with PMHx of TIIDM and sarcoidosis who presented for hypercalcemia and MIKALA. The patient has many features of uncontrolled sarcoidosis and could be manifesting some of the cardiac and respiratory manifestations such as pulmonary fibrosis. The CT chest did not show extensive fibrotic change making ILD component less likely to be causing the dyspnea. Currently being worked up for possible hematologic malignancy. TTE did not show signs of RH failure, however on bedside echo patient did have signs of mild pulmonary edema without peripheral edema and elevated proBNP.  -Can follow up with pulmonary outpatient for sarcoidosis management and steroid taper  -monitor creatinine  -can give trial of inhaled corticosteroids and albuterol PRN  -follow up with lymphnode biospy   -Please add TSH for possible hyperthyroidism, repeat proBNP as clinically patient does not appear in heart failure, can consider HIV testing. 46 yo F with PMHx of TIIDM and sarcoidosis who presented for hypercalcemia and MIKALA. The patient has many features of uncontrolled sarcoidosis and could be manifesting some of the cardiac and respiratory manifestations such as pulmonary fibrosis. The CT chest did not show extensive fibrotic change making ILD component less likely to be causing the dyspnea. Currently being worked up for possible hematologic malignancy. TTE did not show signs of RH failure, however on bedside echo patient did have signs of mild pulmonary edema without peripheral edema and elevated proBNP.  -Can follow up with pulmonary outpatient for sarcoidosis management and steroid taper  -monitor creatinine  -can give trial of inhaled corticosteroids and albuterol PRN  -follow up with lymphnode biospy   -Please add TSH for possible hyperthyroidism, repeat proBNP as clinically patient does not appear in heart failure, can consider HIV testing.  -scheduled for outpatient pulmonoogy appointment on October 31st at 3pm 46 yo F with PMHx of TIIDM and sarcoidosis who presented for hypercalcemia and MIKALA. The patient has many features of uncontrolled sarcoidosis and could be manifesting some of the cardiac and respiratory manifestations such as pulmonary fibrosis. The CT chest did not show extensive fibrotic change making ILD component less likely to be causing the dyspnea. Currently being worked up for possible hematologic malignancy. TTE did not show signs of RH failure, however on bedside echo patient did have signs of mild pulmonary edema without peripheral edema and elevated proBNP.  -Can follow up with pulmonary outpatient for sarcoidosis management and steroid taper  -monitor creatinine  -can give trial of inhaled corticosteroids and albuterol PRN  -follow up with lymphnode biospy   -Please add TSH for possible hyperthyroidism, repeat proBNP as clinically patient does not appear in heart failure.  -scheduled for outpatient pulmonoogy appointment on October 31st at 3pm

## 2017-10-03 NOTE — PROGRESS NOTE ADULT - ASSESSMENT
47-year-old female with known history of sarcoidosis, DM presented to [facility] with complaints of weakness, back pain and LE pain. Pt. found to have MIKALA and hypercalcemia.

## 2017-10-03 NOTE — PROGRESS NOTE ADULT - SUBJECTIVE AND OBJECTIVE BOX
LEONEL SIRIA  [id number]    INTERVAL HPI/OVERNIGHT EVENTS:  Knee pain in AM s/p oxycodone/acetaminophen 5/325    MEDICATIONS  (STANDING):  ampicillin/sulbactam  IVPB      ampicillin/sulbactam  IVPB 1.5 Gram(s) IV Intermittent every 12 hours  dextrose 5%. 1000 milliLiter(s) (50 mL/Hr) IV Continuous <Continuous>  dextrose 50% Injectable 12.5 Gram(s) IV Push once  dextrose 50% Injectable 25 Gram(s) IV Push once  dextrose 50% Injectable 25 Gram(s) IV Push once  docusate sodium 100 milliGRAM(s) Oral three times a day  heparin  Injectable 5000 Unit(s) SubCutaneous every 8 hours  influenza   Vaccine 0.5 milliLiter(s) IntraMuscular once  insulin lispro (HumaLOG) corrective regimen sliding scale   SubCutaneous three times a day before meals  insulin lispro (HumaLOG) corrective regimen sliding scale   SubCutaneous at bedtime  pantoprazole    Tablet 40 milliGRAM(s) Oral before breakfast  senna 2 Tablet(s) Oral at bedtime  vancomycin  IVPB 500 milliGRAM(s) IV Intermittent every 24 hours    MEDICATIONS  (PRN):  dextrose Gel 1 Dose(s) Oral once PRN Blood Glucose LESS THAN 70 milliGRAM(s)/deciliter  glucagon  Injectable 1 milliGRAM(s) IntraMuscular once PRN Glucose LESS THAN 70 milligrams/deciliter  melatonin 3 milliGRAM(s) Oral at bedtime PRN Insomnia  oxyCODONE    5 mG/acetaminophen 325 mG 1 Tablet(s) Oral every 6 hours PRN Severe Pain (7 - 10)      Allergies: No Known Allergies    Review of Systems:   General:   HEENT:   CVS:   Resp:   GI:   MSK:   Skin:   Neuro:       Vital Signs Last 24 Hrs  T(C): 36.7 (03 Oct 2017 06:03), Max: 36.7 (02 Oct 2017 12:38)  T(F): 98 (03 Oct 2017 06:03), Max: 98.1 (02 Oct 2017 21:22)  HR: 83 (03 Oct 2017 06:03) (82 - 87)  BP: 120/70 (03 Oct 2017 06:03) (120/70 - 145/79)  RR: 17 (03 Oct 2017 06:03) (17 - 18)  SpO2: 99% (03 Oct 2017 06:03) (99% - 100%)    Physical Exam:  General:  HEENT:   Cardio:   Resp:   GI:   MSK:  Neuro:   Psych:     LABS:                        9.3    4.11  )-----------( 241      ( 03 Oct 2017 05:37 )             28.1   Complete Blood Count + Automated Diff (10.03.17 @ 05:37)    Auto Eosinophil #: 0.71 K/uL    Auto Eosinophil %: 17.3 %    10-03    141  |  102  |  51<H>  ----------------------------<  140<H>  4.4   |  24  |  3.48<H>    Ca    10.7<H>      03 Oct 2017 05:37  Phos  4.4     10-03  Mg     1.7     10-03      RADIOLOGY & ADDITIONAL TESTS: LEONEL SIRIA  [id number]    INTERVAL HPI/OVERNIGHT EVENTS:  Transient knee pain, including an episode in AM improved w/ oxycodone/acetaminophen 5/325    MEDICATIONS  (STANDING):  ampicillin/sulbactam  IVPB      ampicillin/sulbactam  IVPB 1.5 Gram(s) IV Intermittent every 12 hours  dextrose 5%. 1000 milliLiter(s) (50 mL/Hr) IV Continuous <Continuous>  dextrose 50% Injectable 12.5 Gram(s) IV Push once  dextrose 50% Injectable 25 Gram(s) IV Push once  dextrose 50% Injectable 25 Gram(s) IV Push once  docusate sodium 100 milliGRAM(s) Oral three times a day  heparin  Injectable 5000 Unit(s) SubCutaneous every 8 hours  influenza   Vaccine 0.5 milliLiter(s) IntraMuscular once  insulin lispro (HumaLOG) corrective regimen sliding scale   SubCutaneous three times a day before meals  insulin lispro (HumaLOG) corrective regimen sliding scale   SubCutaneous at bedtime  pantoprazole    Tablet 40 milliGRAM(s) Oral before breakfast  senna 2 Tablet(s) Oral at bedtime  vancomycin  IVPB 500 milliGRAM(s) IV Intermittent every 24 hours    MEDICATIONS  (PRN):  dextrose Gel 1 Dose(s) Oral once PRN Blood Glucose LESS THAN 70 milliGRAM(s)/deciliter  glucagon  Injectable 1 milliGRAM(s) IntraMuscular once PRN Glucose LESS THAN 70 milligrams/deciliter  melatonin 3 milliGRAM(s) Oral at bedtime PRN Insomnia  oxyCODONE    5 mG/acetaminophen 325 mG 1 Tablet(s) Oral every 6 hours PRN Severe Pain (7 - 10)      Allergies: No Known Allergies    Review of Systems:   General:   HEENT:   CVS:   Resp:   GI:   MSK:   Skin:   Neuro:     Vital Signs Last 24 Hrs  T(C): 36.7 (03 Oct 2017 06:03), Max: 36.7 (02 Oct 2017 12:38)  T(F): 98 (03 Oct 2017 06:03), Max: 98.1 (02 Oct 2017 21:22)  HR: 83 (03 Oct 2017 06:03) (82 - 87)  BP: 120/70 (03 Oct 2017 06:03) (120/70 - 145/79)  RR: 17 (03 Oct 2017 06:03) (17 - 18)  SpO2: 99% (03 Oct 2017 06:03) (99% - 100%)    Physical Exam:  General: No acute distress  HEENT: pupils equal-sized, mouth not inflamed  Lymph: Lymphadenopathy palpable in multiple regions including cervical, supraclavicular, axillary  Cardio: regular rate and rhythm  Resp: air movement adequate bilaterally   Abd: Soft, non-tender   MSK: Knees non-tender  Neuro: moves all four extremities  Psych: coherent, goal-oriented    LABS:                        9.3    4.11  )-----------( 241      ( 03 Oct 2017 05:37 )             28.1   Complete Blood Count + Automated Diff (10.03.17 @ 05:37)    Auto Eosinophil #: 0.71 K/uL    Auto Eosinophil %: 17.3 %    10-03    141  |  102  |  51<H>  ----------------------------<  140<H>  4.4   |  24  |  3.48<H>    Ca    10.7<H>      03 Oct 2017 05:37  Phos  4.4     10-03  Mg     1.7     10-03      RADIOLOGY & ADDITIONAL TESTS:  Core Biopsy of Lymph Node, Axillary, Left 2017-10-03   "Final Diagnosis  LYMPH NODE, AXILLARY, LEFT, US GUIDED CORE BIOPSY  SIGNIFICANT FINDINGS.  Non-caseating granulomas, see note.    Note: The touch preps and core biopsy show non-caseating  granulomas in a background of lymphoid tissue. AFB and GMS  special stain results will be reported in an addendum. The  overall findings are compatible with the patient's known history  of sarcoidosis." LEONEL SIRIA  [id number]    INTERVAL HPI/OVERNIGHT EVENTS:  Transient knee pain, including an episode in AM improved w/ oxycodone/acetaminophen 5/325  no other complaints, no  fevers, last headache was yesterday and anxious to go home.     MEDICATIONS  (STANDING):  ampicillin/sulbactam  IVPB      ampicillin/sulbactam  IVPB 1.5 Gram(s) IV Intermittent every 12 hours  dextrose 5%. 1000 milliLiter(s) (50 mL/Hr) IV Continuous <Continuous>  dextrose 50% Injectable 12.5 Gram(s) IV Push once  dextrose 50% Injectable 25 Gram(s) IV Push once  dextrose 50% Injectable 25 Gram(s) IV Push once  docusate sodium 100 milliGRAM(s) Oral three times a day  heparin  Injectable 5000 Unit(s) SubCutaneous every 8 hours  influenza   Vaccine 0.5 milliLiter(s) IntraMuscular once  insulin lispro (HumaLOG) corrective regimen sliding scale   SubCutaneous three times a day before meals  insulin lispro (HumaLOG) corrective regimen sliding scale   SubCutaneous at bedtime  pantoprazole    Tablet 40 milliGRAM(s) Oral before breakfast  senna 2 Tablet(s) Oral at bedtime  vancomycin  IVPB 500 milliGRAM(s) IV Intermittent every 24 hours    MEDICATIONS  (PRN):  dextrose Gel 1 Dose(s) Oral once PRN Blood Glucose LESS THAN 70 milliGRAM(s)/deciliter  glucagon  Injectable 1 milliGRAM(s) IntraMuscular once PRN Glucose LESS THAN 70 milligrams/deciliter  melatonin 3 milliGRAM(s) Oral at bedtime PRN Insomnia  oxyCODONE    5 mG/acetaminophen 325 mG 1 Tablet(s) Oral every 6 hours PRN Severe Pain (7 - 10)      Allergies: No Known Allergies    Review of Systems: negative other than as above.       Vital Signs Last 24 Hrs  T(C): 36.7 (03 Oct 2017 06:03), Max: 36.7 (02 Oct 2017 12:38)  T(F): 98 (03 Oct 2017 06:03), Max: 98.1 (02 Oct 2017 21:22)  HR: 83 (03 Oct 2017 06:03) (82 - 87)  BP: 120/70 (03 Oct 2017 06:03) (120/70 - 145/79)  RR: 17 (03 Oct 2017 06:03) (17 - 18)  SpO2: 99% (03 Oct 2017 06:03) (99% - 100%)    Physical Exam:  General: No acute distress  HEENT: pupils equal-sized, mouth not inflamed  Lymph: Lymphadenopathy palpable in multiple regions including cervical, supraclavicular, axillary  Cardio: regular rate and rhythm  Resp: air movement adequate bilaterally   Abd: Soft, non-tender   MSK: Knees non-tender  Neuro: moves all four extremities  Psych: coherent, goal-oriented    LABS:                        9.3    4.11  )-----------( 241      ( 03 Oct 2017 05:37 )             28.1   Complete Blood Count + Automated Diff (10.03.17 @ 05:37)    Auto Eosinophil #: 0.71 K/uL    Auto Eosinophil %: 17.3 %    10-03    141  |  102  |  51<H>  ----------------------------<  140<H>  4.4   |  24  |  3.48<H>    Ca    10.7<H>      03 Oct 2017 05:37  Phos  4.4     10-03  Mg     1.7     10-03      RADIOLOGY & ADDITIONAL TESTS:  Core Biopsy of Lymph Node, Axillary, Left 2017-10-03   "Final Diagnosis  LYMPH NODE, AXILLARY, LEFT, US GUIDED CORE BIOPSY  SIGNIFICANT FINDINGS.  Non-caseating granulomas, see note.    Note: The touch preps and core biopsy show non-caseating  granulomas in a background of lymphoid tissue. AFB and GMS  special stain results will be reported in an addendum. The  overall findings are compatible with the patient's known history  of sarcoidosis."

## 2017-10-03 NOTE — PROGRESS NOTE ADULT - SUBJECTIVE AND OBJECTIVE BOX
CHIEF COMPLAINT: Abnormal lab values    Interval Events: Patient states that she is currently doing well with no SOB and not requiring NC. She also denies fevers, chills, chest pain, headache, N/V, abdominal pain    REVIEW OF SYSTEMS:  Constitutional: [X ] negative [ ] fevers [ ] chills [ ] weight loss [ ] weight gain  HEENT: [X ] negative [ ] dry eyes [ ] eye irritation [ ] postnasal drip [ ] nasal congestion  CV: [X ] negative  [ ] chest pain [ ] orthopnea [ ] palpitations [ ] murmur  Resp: [X ] negative [ ] cough [ ] shortness of breath [ ] dyspnea [ ] wheezing [ ] sputum [ ] hemoptysis  GI: [X ] negative [ ] nausea [ ] vomiting [ ] diarrhea [ ] constipation [ ] abd pain [ ] dysphagia   : [X ] negative [ ] dysuria [ ] nocturia [ ] hematuria [ ] increased urinary frequency  Musculoskeletal: [X ] negative [ ] back pain [ ] myalgias [ ] arthralgias [ ] fracture  Skin: [X ] negative [ ] rash [ ] itch  Neurological: [X ] negative [ ] headache [ ] dizziness [ ] syncope [ ] weakness [ ] numbness  Psychiatric: [X ] negative [ ] anxiety [ ] depression  Endocrine: [ X] negative [ ] diabetes [ ] thyroid problem  Hematologic/Lymphatic: [ X] negative [ ] anemia [ ] bleeding problem  Allergic/Immunologic: [X ] negative [ ] itchy eyes [ ] nasal discharge [ ] hives [ ] angioedema  [ ] All other systems negative  [ ] Unable to assess ROS because ________    OBJECTIVE:  ICU Vital Signs Last 24 Hrs  T(C): 36.7 (03 Oct 2017 06:03), Max: 36.7 (02 Oct 2017 12:38)  T(F): 98 (03 Oct 2017 06:03), Max: 98.1 (02 Oct 2017 21:22)  HR: 83 (03 Oct 2017 06:03) (82 - 87)  BP: 120/70 (03 Oct 2017 06:03) (120/70 - 145/79)  BP(mean): --  ABP: --  ABP(mean): --  RR: 17 (03 Oct 2017 06:03) (17 - 18)  SpO2: 99% (03 Oct 2017 06:03) (99% - 100%)        CAPILLARY BLOOD GLUCOSE  116 (03 Oct 2017 08:42)        PHYSICAL EXAM:  General: Lying in bed no acute distress  HEENT: Injected conjunctiva, PERRL  Lymph Nodes: diffuse lymphadenopathy down cervical, posterior and SCM chain as well as axillary  Neck: No JVD  Respiratory: Mild inspiratory crackles L and R LL, no dullness to percussion  Cardiovascular: normal S1, S2, no rubs or gallops  Abdomen: No TTP, normoactive BS, no distention  Extremities: No clubbing, 2+ dorsalis pedis pulses  Skin: right forearm erythema nodosum  Neurological: nonfocal, CN II-XII intact  Psychiatry: normal affect    HOSPITAL MEDICATIONS:  Standing Meds:  ampicillin/sulbactam  IVPB      ampicillin/sulbactam  IVPB 1.5 Gram(s) IV Intermittent every 12 hours  dextrose 5%. 1000 milliLiter(s) IV Continuous <Continuous>  dextrose 50% Injectable 12.5 Gram(s) IV Push once  dextrose 50% Injectable 25 Gram(s) IV Push once  dextrose 50% Injectable 25 Gram(s) IV Push once  docusate sodium 100 milliGRAM(s) Oral three times a day  heparin  Injectable 5000 Unit(s) SubCutaneous every 8 hours  influenza   Vaccine 0.5 milliLiter(s) IntraMuscular once  insulin lispro (HumaLOG) corrective regimen sliding scale   SubCutaneous three times a day before meals  insulin lispro (HumaLOG) corrective regimen sliding scale   SubCutaneous at bedtime  pantoprazole    Tablet 40 milliGRAM(s) Oral before breakfast  senna 2 Tablet(s) Oral at bedtime  vancomycin  IVPB 500 milliGRAM(s) IV Intermittent every 24 hours      PRN Meds:  dextrose Gel 1 Dose(s) Oral once PRN  glucagon  Injectable 1 milliGRAM(s) IntraMuscular once PRN  melatonin 3 milliGRAM(s) Oral at bedtime PRN  oxyCODONE    5 mG/acetaminophen 325 mG 1 Tablet(s) Oral every 6 hours PRN      LABS:                        9.3    4.11  )-----------( 241      ( 03 Oct 2017 05:37 )             28.1     Hgb Trend: 9.3<--, 9.4<--, 8.9<--, 10.1<--, 9.2<--  10-03    141  |  102  |  51<H>  ----------------------------<  140<H>  4.4   |  24  |  3.48<H>    Ca    10.7<H>      03 Oct 2017 05:37  Phos  4.4     10-03  Mg     1.7     10-03      Creatinine Trend: 3.48<--, 3.71<--, 3.59<--, 3.90<--, 3.75<--, 4.11<--            MICROBIOLOGY:     RADIOLOGY:  [X ] Reviewed and interpreted by me    PULMONARY FUNCTION TESTS:    EKG:

## 2017-10-03 NOTE — PROGRESS NOTE ADULT - PROBLEM SELECTOR PLAN 7
Possible 2/2 sarcoid. ANCAs neg no other evidence of vasculitis clinically. Low suspicion for parasitic infection vs persistent allergen    -Trend CBC DVT PPX:Improve Score .4%. Low risk  Dispo:Will d/c today likely after first dose of steroids.

## 2017-10-04 LAB — NON-GYNECOLOGICAL CYTOLOGY STUDY: SIGNIFICANT CHANGE UP

## 2017-10-06 ENCOUNTER — RX RENEWAL (OUTPATIENT)
Age: 47
End: 2017-10-06

## 2017-10-19 ENCOUNTER — INPATIENT (INPATIENT)
Facility: HOSPITAL | Age: 47
LOS: 1 days | Discharge: HOME | End: 2017-10-21
Attending: HOSPITALIST | Admitting: HOSPITALIST
Payer: MEDICAID

## 2017-10-19 ENCOUNTER — APPOINTMENT (OUTPATIENT)
Dept: RHEUMATOLOGY | Facility: CLINIC | Age: 47
End: 2017-10-19
Payer: MEDICAID

## 2017-10-19 VITALS
WEIGHT: 116 LBS | HEIGHT: 61 IN | TEMPERATURE: 98.3 F | DIASTOLIC BLOOD PRESSURE: 84 MMHG | SYSTOLIC BLOOD PRESSURE: 130 MMHG | BODY MASS INDEX: 21.9 KG/M2 | OXYGEN SATURATION: 98 % | HEART RATE: 106 BPM

## 2017-10-19 PROCEDURE — 99205 OFFICE O/P NEW HI 60 MIN: CPT | Mod: GC

## 2017-10-20 ENCOUNTER — APPOINTMENT (OUTPATIENT)
Dept: INTERNAL MEDICINE | Facility: CLINIC | Age: 47
End: 2017-10-20

## 2017-10-20 VITALS
HEART RATE: 108 BPM | TEMPERATURE: 98 F | DIASTOLIC BLOOD PRESSURE: 96 MMHG | RESPIRATION RATE: 24 BRPM | SYSTOLIC BLOOD PRESSURE: 134 MMHG | OXYGEN SATURATION: 100 %

## 2017-10-20 DIAGNOSIS — R06.02 SHORTNESS OF BREATH: ICD-10-CM

## 2017-10-20 DIAGNOSIS — Z29.9 ENCOUNTER FOR PROPHYLACTIC MEASURES, UNSPECIFIED: ICD-10-CM

## 2017-10-20 DIAGNOSIS — D86.9 SARCOIDOSIS, UNSPECIFIED: ICD-10-CM

## 2017-10-20 DIAGNOSIS — E11.9 TYPE 2 DIABETES MELLITUS WITHOUT COMPLICATIONS: ICD-10-CM

## 2017-10-20 DIAGNOSIS — E87.8 OTHER DISORDERS OF ELECTROLYTE AND FLUID BALANCE, NOT ELSEWHERE CLASSIFIED: ICD-10-CM

## 2017-10-20 LAB
ALBUMIN SERPL ELPH-MCNC: 3.9 G/DL — SIGNIFICANT CHANGE UP (ref 3.3–5)
ALP SERPL-CCNC: 63 U/L — SIGNIFICANT CHANGE UP (ref 40–120)
ALT FLD-CCNC: 35 U/L — HIGH (ref 4–33)
APPEARANCE UR: CLEAR — SIGNIFICANT CHANGE UP
APTT BLD: 23.2 SEC — LOW (ref 27.5–37.4)
AST SERPL-CCNC: 28 U/L — SIGNIFICANT CHANGE UP (ref 4–32)
B PERT DNA SPEC QL NAA+PROBE: SIGNIFICANT CHANGE UP
BASOPHILS # BLD AUTO: 0.02 K/UL — SIGNIFICANT CHANGE UP (ref 0–0.2)
BASOPHILS NFR BLD AUTO: 0.3 % — SIGNIFICANT CHANGE UP (ref 0–2)
BILIRUB SERPL-MCNC: 0.4 MG/DL — SIGNIFICANT CHANGE UP (ref 0.2–1.2)
BILIRUB UR-MCNC: NEGATIVE — SIGNIFICANT CHANGE UP
BLOOD UR QL VISUAL: NEGATIVE — SIGNIFICANT CHANGE UP
BUN SERPL-MCNC: 39 MG/DL — HIGH (ref 7–23)
BUN SERPL-MCNC: 42 MG/DL — HIGH (ref 7–23)
BUN SERPL-MCNC: 49 MG/DL — HIGH (ref 7–23)
C PNEUM DNA SPEC QL NAA+PROBE: NOT DETECTED — SIGNIFICANT CHANGE UP
CALCIUM SERPL-MCNC: 8.2 MG/DL — LOW (ref 8.4–10.5)
CALCIUM SERPL-MCNC: 8.3 MG/DL — LOW (ref 8.4–10.5)
CALCIUM SERPL-MCNC: 8.7 MG/DL — SIGNIFICANT CHANGE UP (ref 8.4–10.5)
CHLORIDE SERPL-SCNC: 101 MMOL/L — SIGNIFICANT CHANGE UP (ref 98–107)
CHLORIDE SERPL-SCNC: 104 MMOL/L — SIGNIFICANT CHANGE UP (ref 98–107)
CHLORIDE SERPL-SCNC: 104 MMOL/L — SIGNIFICANT CHANGE UP (ref 98–107)
CK MB BLD-MCNC: 1 NG/ML — SIGNIFICANT CHANGE UP (ref 1–4.7)
CK SERPL-CCNC: 11 U/L — LOW (ref 25–170)
CO2 SERPL-SCNC: 25 MMOL/L — SIGNIFICANT CHANGE UP (ref 22–31)
CO2 SERPL-SCNC: 25 MMOL/L — SIGNIFICANT CHANGE UP (ref 22–31)
CO2 SERPL-SCNC: 26 MMOL/L — SIGNIFICANT CHANGE UP (ref 22–31)
COLOR SPEC: SIGNIFICANT CHANGE UP
CREAT SERPL-MCNC: 1.59 MG/DL — HIGH (ref 0.5–1.3)
CREAT SERPL-MCNC: 1.59 MG/DL — HIGH (ref 0.5–1.3)
CREAT SERPL-MCNC: 1.78 MG/DL — HIGH (ref 0.5–1.3)
EOSINOPHIL # BLD AUTO: 0.4 K/UL — SIGNIFICANT CHANGE UP (ref 0–0.5)
EOSINOPHIL NFR BLD AUTO: 6.7 % — HIGH (ref 0–6)
FLUAV H1 2009 PAND RNA SPEC QL NAA+PROBE: NOT DETECTED — SIGNIFICANT CHANGE UP
FLUAV H1 RNA SPEC QL NAA+PROBE: NOT DETECTED — SIGNIFICANT CHANGE UP
FLUAV H3 RNA SPEC QL NAA+PROBE: NOT DETECTED — SIGNIFICANT CHANGE UP
FLUAV SUBTYP SPEC NAA+PROBE: SIGNIFICANT CHANGE UP
FLUBV RNA SPEC QL NAA+PROBE: NOT DETECTED — SIGNIFICANT CHANGE UP
GLUCOSE BLDC GLUCOMTR-MCNC: 135 MG/DL — HIGH (ref 70–99)
GLUCOSE BLDC GLUCOMTR-MCNC: 171 MG/DL — HIGH (ref 70–99)
GLUCOSE BLDC GLUCOMTR-MCNC: 321 MG/DL — HIGH (ref 70–99)
GLUCOSE BLDC GLUCOMTR-MCNC: 432 MG/DL — HIGH (ref 70–99)
GLUCOSE BLDC GLUCOMTR-MCNC: 467 MG/DL — CRITICAL HIGH (ref 70–99)
GLUCOSE SERPL-MCNC: 139 MG/DL — HIGH (ref 70–99)
GLUCOSE SERPL-MCNC: 190 MG/DL — HIGH (ref 70–99)
GLUCOSE SERPL-MCNC: 205 MG/DL — HIGH (ref 70–99)
GLUCOSE UR-MCNC: 500 — SIGNIFICANT CHANGE UP
HADV DNA SPEC QL NAA+PROBE: NOT DETECTED — SIGNIFICANT CHANGE UP
HCG UR-SCNC: NEGATIVE — SIGNIFICANT CHANGE UP
HCOV 229E RNA SPEC QL NAA+PROBE: NOT DETECTED — SIGNIFICANT CHANGE UP
HCOV HKU1 RNA SPEC QL NAA+PROBE: NOT DETECTED — SIGNIFICANT CHANGE UP
HCOV NL63 RNA SPEC QL NAA+PROBE: NOT DETECTED — SIGNIFICANT CHANGE UP
HCOV OC43 RNA SPEC QL NAA+PROBE: NOT DETECTED — SIGNIFICANT CHANGE UP
HCT VFR BLD CALC: 29.6 % — LOW (ref 34.5–45)
HGB BLD-MCNC: 9.1 G/DL — LOW (ref 11.5–15.5)
HMPV RNA SPEC QL NAA+PROBE: NOT DETECTED — SIGNIFICANT CHANGE UP
HPIV1 RNA SPEC QL NAA+PROBE: NOT DETECTED — SIGNIFICANT CHANGE UP
HPIV2 RNA SPEC QL NAA+PROBE: NOT DETECTED — SIGNIFICANT CHANGE UP
HPIV3 RNA SPEC QL NAA+PROBE: NOT DETECTED — SIGNIFICANT CHANGE UP
HPIV4 RNA SPEC QL NAA+PROBE: NOT DETECTED — SIGNIFICANT CHANGE UP
HYALINE CASTS # UR AUTO: SIGNIFICANT CHANGE UP (ref 0–?)
IMM GRANULOCYTES # BLD AUTO: 0.33 # — SIGNIFICANT CHANGE UP
IMM GRANULOCYTES NFR BLD AUTO: 5.5 % — HIGH (ref 0–1.5)
INR BLD: 0.91 — SIGNIFICANT CHANGE UP (ref 0.88–1.17)
KETONES UR-MCNC: NEGATIVE — SIGNIFICANT CHANGE UP
LEUKOCYTE ESTERASE UR-ACNC: NEGATIVE — SIGNIFICANT CHANGE UP
LYMPHOCYTES # BLD AUTO: 1.03 K/UL — SIGNIFICANT CHANGE UP (ref 1–3.3)
LYMPHOCYTES # BLD AUTO: 17.2 % — SIGNIFICANT CHANGE UP (ref 13–44)
M PNEUMO DNA SPEC QL NAA+PROBE: NOT DETECTED — SIGNIFICANT CHANGE UP
MAGNESIUM SERPL-MCNC: 1.3 MG/DL — LOW (ref 1.6–2.6)
MAGNESIUM SERPL-MCNC: 1.5 MG/DL — LOW (ref 1.6–2.6)
MCHC RBC-ENTMCNC: 26.3 PG — LOW (ref 27–34)
MCHC RBC-ENTMCNC: 30.7 % — LOW (ref 32–36)
MCV RBC AUTO: 85.5 FL — SIGNIFICANT CHANGE UP (ref 80–100)
MONOCYTES # BLD AUTO: 0.53 K/UL — SIGNIFICANT CHANGE UP (ref 0–0.9)
MONOCYTES NFR BLD AUTO: 8.8 % — SIGNIFICANT CHANGE UP (ref 2–14)
MUCOUS THREADS # UR AUTO: SIGNIFICANT CHANGE UP
NEUTROPHILS # BLD AUTO: 3.68 K/UL — SIGNIFICANT CHANGE UP (ref 1.8–7.4)
NEUTROPHILS NFR BLD AUTO: 61.5 % — SIGNIFICANT CHANGE UP (ref 43–77)
NITRITE UR-MCNC: NEGATIVE — SIGNIFICANT CHANGE UP
NRBC # FLD: 0 — SIGNIFICANT CHANGE UP
PH UR: 6 — SIGNIFICANT CHANGE UP (ref 4.6–8)
PHOSPHATE SERPL-MCNC: 4.3 MG/DL — SIGNIFICANT CHANGE UP (ref 2.5–4.5)
PHOSPHATE SERPL-MCNC: 4.6 MG/DL — HIGH (ref 2.5–4.5)
PLATELET # BLD AUTO: 260 K/UL — SIGNIFICANT CHANGE UP (ref 150–400)
PMV BLD: 8.9 FL — SIGNIFICANT CHANGE UP (ref 7–13)
POTASSIUM SERPL-MCNC: 5.3 MMOL/L — SIGNIFICANT CHANGE UP (ref 3.5–5.3)
POTASSIUM SERPL-MCNC: 5.4 MMOL/L — HIGH (ref 3.5–5.3)
POTASSIUM SERPL-MCNC: 5.7 MMOL/L — HIGH (ref 3.5–5.3)
POTASSIUM SERPL-SCNC: 5.3 MMOL/L — SIGNIFICANT CHANGE UP (ref 3.5–5.3)
POTASSIUM SERPL-SCNC: 5.4 MMOL/L — HIGH (ref 3.5–5.3)
POTASSIUM SERPL-SCNC: 5.7 MMOL/L — HIGH (ref 3.5–5.3)
PROT SERPL-MCNC: 6.7 G/DL — SIGNIFICANT CHANGE UP (ref 6–8.3)
PROT UR-MCNC: 10 — SIGNIFICANT CHANGE UP
PROTHROM AB SERPL-ACNC: 10.2 SEC — SIGNIFICANT CHANGE UP (ref 9.8–13.1)
RBC # BLD: 3.46 M/UL — LOW (ref 3.8–5.2)
RBC # FLD: 16 % — HIGH (ref 10.3–14.5)
RBC CASTS # UR COMP ASSIST: SIGNIFICANT CHANGE UP (ref 0–?)
RSV RNA SPEC QL NAA+PROBE: NOT DETECTED — SIGNIFICANT CHANGE UP
RV+EV RNA SPEC QL NAA+PROBE: NOT DETECTED — SIGNIFICANT CHANGE UP
SODIUM SERPL-SCNC: 139 MMOL/L — SIGNIFICANT CHANGE UP (ref 135–145)
SODIUM SERPL-SCNC: 141 MMOL/L — SIGNIFICANT CHANGE UP (ref 135–145)
SODIUM SERPL-SCNC: 141 MMOL/L — SIGNIFICANT CHANGE UP (ref 135–145)
SP GR SPEC: 1.01 — SIGNIFICANT CHANGE UP (ref 1–1.03)
SP GR UR: 1.01 — SIGNIFICANT CHANGE UP (ref 1–1.03)
SQUAMOUS # UR AUTO: SIGNIFICANT CHANGE UP
TROPONIN T SERPL-MCNC: < 0.06 NG/ML — SIGNIFICANT CHANGE UP (ref 0–0.06)
UROBILINOGEN FLD QL: NORMAL E.U. — SIGNIFICANT CHANGE UP (ref 0.1–0.2)
WBC # BLD: 5.99 K/UL — SIGNIFICANT CHANGE UP (ref 3.8–10.5)
WBC # FLD AUTO: 5.99 K/UL — SIGNIFICANT CHANGE UP (ref 3.8–10.5)
WBC UR QL: SIGNIFICANT CHANGE UP (ref 0–?)

## 2017-10-20 PROCEDURE — 99223 1ST HOSP IP/OBS HIGH 75: CPT | Mod: GC

## 2017-10-20 PROCEDURE — 93970 EXTREMITY STUDY: CPT | Mod: 26

## 2017-10-20 PROCEDURE — 71020: CPT | Mod: 26

## 2017-10-20 PROCEDURE — 71250 CT THORAX DX C-: CPT | Mod: 26

## 2017-10-20 PROCEDURE — 78582 LUNG VENTILAT&PERFUS IMAGING: CPT | Mod: 26,GC

## 2017-10-20 RX ORDER — INFLUENZA VIRUS VACCINE 15; 15; 15; 15 UG/.5ML; UG/.5ML; UG/.5ML; UG/.5ML
0.5 SUSPENSION INTRAMUSCULAR ONCE
Qty: 0 | Refills: 0 | Status: DISCONTINUED | OUTPATIENT
Start: 2017-10-20 | End: 2017-10-21

## 2017-10-20 RX ORDER — DOCUSATE SODIUM 100 MG
100 CAPSULE ORAL DAILY
Qty: 0 | Refills: 0 | Status: DISCONTINUED | OUTPATIENT
Start: 2017-10-20 | End: 2017-10-21

## 2017-10-20 RX ORDER — INSULIN LISPRO 100/ML
VIAL (ML) SUBCUTANEOUS AT BEDTIME
Qty: 0 | Refills: 0 | Status: DISCONTINUED | OUTPATIENT
Start: 2017-10-20 | End: 2017-10-21

## 2017-10-20 RX ORDER — SODIUM POLYSTYRENE SULFONATE 4.1 MEQ/G
15 POWDER, FOR SUSPENSION ORAL ONCE
Qty: 0 | Refills: 0 | Status: COMPLETED | OUTPATIENT
Start: 2017-10-20 | End: 2017-10-20

## 2017-10-20 RX ORDER — INSULIN LISPRO 100/ML
VIAL (ML) SUBCUTANEOUS
Qty: 0 | Refills: 0 | Status: DISCONTINUED | OUTPATIENT
Start: 2017-10-20 | End: 2017-10-21

## 2017-10-20 RX ORDER — INSULIN LISPRO 100/ML
VIAL (ML) SUBCUTANEOUS AT BEDTIME
Qty: 0 | Refills: 0 | Status: DISCONTINUED | OUTPATIENT
Start: 2017-10-20 | End: 2017-10-20

## 2017-10-20 RX ORDER — SODIUM CHLORIDE 9 MG/ML
1000 INJECTION INTRAMUSCULAR; INTRAVENOUS; SUBCUTANEOUS ONCE
Qty: 0 | Refills: 0 | Status: COMPLETED | OUTPATIENT
Start: 2017-10-20 | End: 2017-10-20

## 2017-10-20 RX ORDER — PANTOPRAZOLE SODIUM 20 MG/1
40 TABLET, DELAYED RELEASE ORAL
Qty: 0 | Refills: 0 | Status: DISCONTINUED | OUTPATIENT
Start: 2017-10-20 | End: 2017-10-21

## 2017-10-20 RX ORDER — INSULIN LISPRO 100/ML
VIAL (ML) SUBCUTANEOUS
Qty: 0 | Refills: 0 | Status: DISCONTINUED | OUTPATIENT
Start: 2017-10-20 | End: 2017-10-20

## 2017-10-20 RX ORDER — GLUCAGON INJECTION, SOLUTION 0.5 MG/.1ML
1 INJECTION, SOLUTION SUBCUTANEOUS ONCE
Qty: 0 | Refills: 0 | Status: DISCONTINUED | OUTPATIENT
Start: 2017-10-20 | End: 2017-10-21

## 2017-10-20 RX ORDER — POLYETHYLENE GLYCOL 3350 17 G/17G
17 POWDER, FOR SOLUTION ORAL DAILY
Qty: 0 | Refills: 0 | Status: DISCONTINUED | OUTPATIENT
Start: 2017-10-20 | End: 2017-10-21

## 2017-10-20 RX ORDER — HEPARIN SODIUM 5000 [USP'U]/ML
5000 INJECTION INTRAVENOUS; SUBCUTANEOUS EVERY 8 HOURS
Qty: 0 | Refills: 0 | Status: DISCONTINUED | OUTPATIENT
Start: 2017-10-20 | End: 2017-10-21

## 2017-10-20 RX ORDER — DEXTROSE 50 % IN WATER 50 %
25 SYRINGE (ML) INTRAVENOUS ONCE
Qty: 0 | Refills: 0 | Status: DISCONTINUED | OUTPATIENT
Start: 2017-10-20 | End: 2017-10-21

## 2017-10-20 RX ORDER — SODIUM CHLORIDE 9 MG/ML
1000 INJECTION, SOLUTION INTRAVENOUS
Qty: 0 | Refills: 0 | Status: DISCONTINUED | OUTPATIENT
Start: 2017-10-20 | End: 2017-10-21

## 2017-10-20 RX ORDER — DEXTROSE 50 % IN WATER 50 %
1 SYRINGE (ML) INTRAVENOUS ONCE
Qty: 0 | Refills: 0 | Status: DISCONTINUED | OUTPATIENT
Start: 2017-10-20 | End: 2017-10-21

## 2017-10-20 RX ORDER — SENNA PLUS 8.6 MG/1
2 TABLET ORAL AT BEDTIME
Qty: 0 | Refills: 0 | Status: DISCONTINUED | OUTPATIENT
Start: 2017-10-20 | End: 2017-10-21

## 2017-10-20 RX ORDER — DEXTROSE 50 % IN WATER 50 %
12.5 SYRINGE (ML) INTRAVENOUS ONCE
Qty: 0 | Refills: 0 | Status: DISCONTINUED | OUTPATIENT
Start: 2017-10-20 | End: 2017-10-21

## 2017-10-20 RX ORDER — MAGNESIUM SULFATE 500 MG/ML
2 VIAL (ML) INJECTION ONCE
Qty: 0 | Refills: 0 | Status: COMPLETED | OUTPATIENT
Start: 2017-10-20 | End: 2017-10-20

## 2017-10-20 RX ADMIN — SENNA PLUS 2 TABLET(S): 8.6 TABLET ORAL at 22:22

## 2017-10-20 RX ADMIN — HEPARIN SODIUM 5000 UNIT(S): 5000 INJECTION INTRAVENOUS; SUBCUTANEOUS at 15:28

## 2017-10-20 RX ADMIN — SODIUM CHLORIDE 1000 MILLILITER(S): 9 INJECTION INTRAMUSCULAR; INTRAVENOUS; SUBCUTANEOUS at 02:55

## 2017-10-20 RX ADMIN — Medication 50 GRAM(S): at 11:22

## 2017-10-20 RX ADMIN — POLYETHYLENE GLYCOL 3350 17 GRAM(S): 17 POWDER, FOR SOLUTION ORAL at 16:17

## 2017-10-20 RX ADMIN — SODIUM POLYSTYRENE SULFONATE 15 GRAM(S): 4.1 POWDER, FOR SUSPENSION ORAL at 05:35

## 2017-10-20 RX ADMIN — Medication 8: at 21:59

## 2017-10-20 RX ADMIN — HEPARIN SODIUM 5000 UNIT(S): 5000 INJECTION INTRAVENOUS; SUBCUTANEOUS at 22:22

## 2017-10-20 RX ADMIN — Medication 60 MILLIGRAM(S): at 15:30

## 2017-10-20 RX ADMIN — SODIUM POLYSTYRENE SULFONATE 15 GRAM(S): 4.1 POWDER, FOR SUSPENSION ORAL at 11:43

## 2017-10-20 RX ADMIN — Medication 4: at 18:30

## 2017-10-20 NOTE — H&P ADULT - PROBLEM SELECTOR PLAN 2
- Pt with recent high dose prednisone taper, currently on 10 mg TID  - will check ESR/CRP, ACE level  - will consult Rheum and Pulmonology

## 2017-10-20 NOTE — H&P ADULT - HISTORY OF PRESENT ILLNESS
Pt is a 46 yo F with PMH of sarcoidosis (on prednisone) and diabetes presenting here for 2-3 days for shortness of breath with exertion. She had a recent admission at [facility] 9/26-10/3 for SOB, found to have hypercalcemia, and MIKALA. During admission, she had an episode of dyspnea and hypoxia that improved with IV Lasix. She was discharged with 50 mg prednisone and told to f/u with cardiology, pulmonology (Dr. Jacinto Adams), nephrology (Dr. Jose M Garibay), and rheumatology (Dr. Ospina). 2-3 days prior to admission she began having dyspnea, mostly after walking or talking. She reports orthopnea, and requires 3-4 pillows under her head to sleep at night. The day before admission she went to her cardiologist, who recommended ED visit because of hypoxia and tachycardia. She also complains of an intermittent non-productive cough, chest tightness, palpitations, suprapubic tenderness, b/l lower leg pain, chills, headaches, increased eye discharge. She denies night sweats, n/v/d, hematochezia, skin rashes, epistaxis. She denies any sick contacts, recent travel, or pets at home.   In the ED, her vital signs were T 98.3F, /min, /96, RR 18/min, SpO2 100% on RA. She was found to have a Hb/Hc of 9/29.6, K of 5.7, BUN of 49, creatinine of 1.78, and Mg of 1.5. UA and RVP was within normal limits. Pulmonary V/P scan was performed to r/o PE, and showed no "radionuclide evidence of pulmonary embolus." 2g Mg sulfate was given, but Mg worsened to 1.3. 2 doses of 15g sodium polysterene sulfonate was given and K improved to 5.4. Pt is a 46 yo F with PMH of sarcoidosis (on prednisone taper) and DM (A1C 9/27 of 6.9) presenting here for 2-3 days for ENGLISH. Pt was recently admitted to [facility] from 9/26-10/3 for SOB, found to have hypercalcemia (2/2 Sarcoidosis), and MIKALA (2/2 NSAID use). She was discharged with 50 mg prednisone taper and told to f/u with cardiology, pulmonology (Dr. Jacinto Adams), nephrology (Dr. Jose M Garibay), and rheumatology (Dr. Ospina). 2-3 days prior to admission she began having dyspnea, mostly after walking or talking. She reports orthopnea, and requires 3-4 pillows under her head to sleep at night. Pt complained to consistently worsening ENGLISH which caused her to have trouble completing her ADLs. One day prior to presentation, she went to her Rheumatologist, who recommended ED visit because of hypoxia and tachycardia. She also complains of an intermittent non-productive cough, chest tightness, palpitations, suprapubic tenderness, b/l lower leg pain, chills, headaches, increased eye discharge. She denies night sweats, n/v/d, hematochezia, skin rashes, epistaxis. She denies any sick contacts, recent travel, or pets at home.   In the ED, pt was found to be tachycardic,( ) hyperkalemia (K=5.7). Due to pts tachycardia and SOB, a VQ scan was performed to r/o PE, which was negative. Pt was give Kayexalate for her hyperkalemia with improvement to 5.4. Pt was then admitted to medicine. Pt is a 48 yo F with PMH of sarcoidosis (on prednisone taper), nephrolithiasis, and DM (A1C 9/27 of 6.9) presenting here with 2-3 days for ENGLISH. Pt was recently admitted to [facility] from 9/26-10/3 for SOB, found to have hypercalcemia (2/2 Sarcoidosis), and MIKALA (2/2 NSAID use). She was discharged with 50 mg prednisone taper and told to f/u with cardiology, pulmonology (Dr. Jacinto Adams), nephrology (Dr. Jose M Garibay), and rheumatology (Dr. Ospina). 2-3 days prior to admission she began having dyspnea, mostly after walking or talking. She reports orthopnea, and requires 3-4 pillows under her head to sleep at night. Pt complained to consistently worsening ENGLISH which caused her to have trouble completing her ADLs. One day prior to presentation, she went to her Rheumatologist, who recommended ED visit because of hypoxia and tachycardia. She also complains of an intermittent non-productive cough, chest tightness, palpitations, suprapubic tenderness, b/l lower leg pain, chills, headaches, increased eye discharge. She denies night sweats, n/v/d, hematochezia, skin rashes, epistaxis. She denies any sick contacts, recent travel, or pets at home.   In the ED, pt was found to be tachycardic,( ) hyperkalemia (K=5.7). Due to pts tachycardia and SOB, a VQ scan was performed to r/o PE, which was negative. Pt was give Kayexalate for her hyperkalemia with improvement to 5.4. Pt was then admitted to medicine.

## 2017-10-20 NOTE — ED ADULT TRIAGE NOTE - CHIEF COMPLAINT QUOTE
pt was discharged from [facility] on October 2nd after admission for sarcoidosis. c/o worsening SOB since discharge. Also c/o chest pressure since 2 days ago. appears to be SOB at triage.  pt went to her PMD Yesterday who sent her to the ER. c/o swelling to both feet and weakness.

## 2017-10-20 NOTE — H&P ADULT - NSHPSOCIALHISTORY_GEN_ALL_CORE
Pt denies any tobacco, EtOH, or illicit drug use. Pt lives at home with her children and mother, and is a manager at a local restaurant.

## 2017-10-20 NOTE — ED PROVIDER NOTE - OBJECTIVE STATEMENT
47y f w sarcoidosis, recently hospitalized 9/26-10/3 for SOB, hypercalcemia, and MIKALA, here today for persistent SOB since d/c 47y f w sarcoidosis on prednisone, recently hospitalized 9/26-10/3 for SOB, hypercalcemia, and MIKALA, here today for persistent SOB since discharged from hospital. Says has not acutely worsened, just remained. Saw PMD today who recommended coming to ED for further eval. Since d/c has been using 3 pillow due to orthopnea lying flat. Denies cp. Also c/o dry cough. No fever. Has pain in both lower legs and feet that has not changed recently. Also c/o diffuse abd pain, worst in rlq, as well as back pain on both sides. 47y f w sarcoidosis on prednisone, recently hospitalized 9/26-10/3 for SOB, hypercalcemia, and MIKALA, here today for persistent SOB since discharged from hospital. Says has not acutely worsened, just remained. Saw cardiologist today who recommended coming to ED for further eval (said pt hypoxic, tachycardic, w hemoptysis). Since d/c has been using 3 pillow due to orthopnea lying flat. Denies cp. Also c/o dry cough. No fever. Has pain in both lower legs and feet that has not changed recently. Also c/o diffuse abd pain, worst in rlq, as well as back pain on both sides.

## 2017-10-20 NOTE — ED ADULT NURSE NOTE - CHIEF COMPLAINT QUOTE
pt was discharged from [facility] on October 2nd after admission for sarcoidosis. c/o worsening SOB since discharge. Also c/o chest pressure since 2 days ago. appears to be SOB at triage.  pt went to her PMD Yesterday who sent her to the ER. c/o swelling to both feet and weakness.

## 2017-10-20 NOTE — ED ADULT NURSE NOTE - OBJECTIVE STATEMENT
Break coverage- Pt received to spot #7. AAOx4, c/o feeling SOB after being discharged from the hospital for sarcoidosis. c/o intermittent chest pain and back pain. Also c/o B/L LE pain. Respiratory even and unlabored. Denies n/v. MD glasgow performed. #22g IV placed to left ac, labs drawn and sent. Family members at bedside. no acute distress. Awaiting further plan of care.

## 2017-10-20 NOTE — H&P ADULT - ASSESSMENT
Pt is a 46 yo F with PMH of sarcoidosis (on prednisone taper), nephrolithiasis, and DM (A1C 9/27 of 6.9) presenting here with 2-3 days for ENGLISH.

## 2017-10-20 NOTE — H&P ADULT - FAMILY HISTORY
Family history of type C viral hepatitis, Mother     Father  Still living? Unknown  Family history of diabetes mellitus in first degree relative, Age at diagnosis: Age Unknown     Mother  Still living? Unknown  Family history of diabetes mellitus in first degree relative, Age at diagnosis: Age Unknown

## 2017-10-20 NOTE — CONSULT NOTE ADULT - ASSESSMENT
47yoF hx of sarcoidosis (dx 2010 via biopsy showing noncaseating granulomas), T2DM sent in from our office after the patient was found to be dyspnic and tachycardic.  Here in the ED patient remains dyspneic and tachycardic with diffuse arthralgia and myalgia.  Concern for ongoing sarcoidosis flare vs. infectious etiology.    Plan:  Solumedrol 60mg once STAT once now and then cont 30mg BID daily starting tomorrow  RVP panel  follow BMP  CT chest wo contrast  pulmonary evaluation  close blood glucose control    Will follow    JUAN Cordova 47yoF hx of sarcoidosis (dx 2010 via biopsy showing noncaseating granulomas), T2DM sent in from our office after the patient was found to be dyspnic and tachycardic.  Here in the ED patient remains dyspneic and tachycardic with diffuse arthralgia and myalgia.  Concern for ongoing sarcoidosis flare vs. infectious etiology.    Plan:  Solumedrol 60mg once now and then cont 30mg BID daily starting tomorrow  RVP panel  follow BMP  CT chest wo contrast  pulmonary evaluation  close blood glucose monitoring    Will follow    JUAN Cordova

## 2017-10-20 NOTE — CONSULT NOTE ADULT - SUBJECTIVE AND OBJECTIVE BOX
MAYRAAVINASH BEVERLY  [id number]    HISTORY OF PRESENT ILLNESS:    THIS IS A PRELIM WORKING NOTE.      47yoF hx of sarcoidosis (dx 2010 via biopsy showing noncaseating granulomas), T2DM sent in from our office after evaluation of post hospitalization.    She reports SOB with or wo exertion - feels at baseline doesn't have enough oxygen.  she also has intermittent productive cough (green at times), intermittent chest pain substernal with no specific activity - exertion or with supine position, LE edema and b/l knee and ankle pain with ankle joint swelling Also reports some facial edema but no fever or rash.  She further reported elev BP at home with diastolic in the 100s.  She reports orthopnea with needing 3 pillows to sleep    She was previously hospitalized 17 for abnormal labs showing MIKALA and hypercalcemia, in setting of subacute-to-acute sx/sx including weight loss, night sweats, and dyspnea on exertion. Pt on admission () had Ca+ 15.3, creatinine 5.65, diffuse lymphadenopathy on physical exam and CT chest.  s/p lymph node bx on  confirming sarcoidosis.    Currently, in the ED with diffuse arthalgia and myalgia.  Reports she didn't take her prednisone yesterday and has        PAST MEDICAL & SURGICAL HISTORY:  Diabetes  Sarcoidosis  No significant past surgical history      Review of Systems:  Gen:  No fevers/chills, weight loss  HEENT: No blurry vision, no difficulty swallowing  CVS: No chest pain/palpitations  Resp: No SOB/wheezing  GI: No N/V/C/D/abdominal pain  MSK:  Skin: No new rashes  Neuro: No headaches    MEDICATIONS  (STANDING):  docusate sodium 100 milliGRAM(s) Oral daily  heparin  Injectable 5000 Unit(s) SubCutaneous every 8 hours  influenza   Vaccine 0.5 milliLiter(s) IntraMuscular once  pantoprazole    Tablet 40 milliGRAM(s) Oral before breakfast  predniSONE   Tablet 10 milliGRAM(s) Oral three times a day  senna 2 Tablet(s) Oral at bedtime    MEDICATIONS  (PRN):      Allergies    No Known Allergies    Intolerances        PERTINENT MEDICATION HISTORY:    SOCIAL HISTORY:  OCCUPATION:  TRAVEL HISTORY:    FAMILY HISTORY:  Family history of diabetes mellitus in first degree relative (Father, Mother)  Family history of type C viral hepatitis: Mother      Vital Signs Last 24 Hrs  T(C): 37.2 (20 Oct 2017 07:23), Max: 37.2 (20 Oct 2017 07:23)  T(F): 98.9 (20 Oct 2017 07:23), Max: 98.9 (20 Oct 2017 07:23)  HR: 100 (20 Oct 2017 07:23) (100 - 108)  BP: 143/82 (20 Oct 2017 07:23) (134/96 - 154/94)  BP(mean): --  RR: 18 (20 Oct 2017 07:23) (18 - 24)  SpO2: 100% (20 Oct 2017 07:23) (100% - 100%)    Daily     Daily     Physical Exam:  General: No apparent distress  HEENT: EOMI, MMM  CVS: +S1/S2, RRR, no murmurs/rubs/gallops  Resp: CTA b/l. No crackles/wheezing  GI: Soft, NT/ND +BS  MSK:  Shoulders: wnl  Elbows: wnl  Wrists: wnl  MCPs: wnl  PIPs: wnl  DIPs: wnl   Hips: wnl  Knees: wnl   Ankle: wnl  Neuro: AAOx3  Skin: no visible rashes    LABS:                        9.1    5.99  )-----------( 260      ( 20 Oct 2017 01:20 )             29.6     10-20    141  |  104  |  42<H>  ----------------------------<  139<H>  5.4<H>   |  26  |  1.59<H>    Ca    8.2<L>      20 Oct 2017 07:10  Phos  4.3     10-20  Mg     1.3     10-20    TPro  6.7  /  Alb  3.9  /  TBili  0.4  /  DBili  x   /  AST  28  /  ALT  35<H>  /  AlkPhos  63  10-20    PT/INR - ( 20 Oct 2017 07:03 )   PT: 10.2 SEC;   INR: 0.91          PTT - ( 20 Oct 2017 07:03 )  PTT:23.2 SEC  Urinalysis Basic - ( 20 Oct 2017 02:22 )    Color: PLYEL / Appearance: CLEAR / S.012 / pH: 6.0  Gluc: 500 / Ketone: NEGATIVE  / Bili: NEGATIVE / Urobili: NORMAL E.U.   Blood: NEGATIVE / Protein: 10 / Nitrite: NEGATIVE   Leuk Esterase: NEGATIVE / RBC: 0-2 / WBC 0-2   Sq Epi: OCC / Non Sq Epi: x / Bacteria: x        RADIOLOGY & ADDITIONAL STUDIES: LEONEL BEVERLY  [id number]    HISTORY OF PRESENT ILLNESS:    47yoF hx of sarcoidosis (dx 2010 via biopsy showing noncaseating granulomas), T2DM sent in from our office after evaluation of post hospitalization.    She reports SOB with or wo exertion - feels at baseline doesn't have enough oxygen.  she also has intermittent productive cough (green at times), intermittent chest pain substernal with no specific activity - exertion or with supine position, LE edema and b/l knee and ankle pain with ankle joint swelling Also reports some facial edema but no fever or rash.  She further reported elev BP at home with diastolic in the 100s.  She reports orthopnea with needing 3 pillows to sleep    She was previously hospitalized 17 for abnormal labs showing MIKALA and hypercalcemia, in setting of subacute-to-acute sx/sx including weight loss, night sweats, and dyspnea on exertion. Pt on admission () had Ca+ 15.3, creatinine 5.65, diffuse lymphadenopathy on physical exam and CT chest.  s/p lymph node bx on  confirming sarcoidosis.    Currently, in the ED with dyspnea, diffuse arthalgia and myalgia.  Reports she didn't take her prednisone yesterday and has yet to receive today's dose totaling 48 hours off of prednisone.    Denies any fever, rash, sore throat, n/v or any other complaints on ROS.    PAST MEDICAL & SURGICAL HISTORY:  Diabetes  Sarcoidosis  No significant past surgical history      Review of Systems:  Gen:  +fatigue, no fevers/chills, weight loss  HEENT: No blurry vision, no difficulty swallowing  CVS: No chest pain/palpitations  Resp: + SOB, cough; no wheezing  GI: No N/V/C/D/abdominal pain  MSK: diffuse arthralgias with increased pain in b/l ankle pain, diffuse myalgia  Skin: No new rashes  Neuro: No headaches    MEDICATIONS  (STANDING):  docusate sodium 100 milliGRAM(s) Oral daily  heparin  Injectable 5000 Unit(s) SubCutaneous every 8 hours  influenza   Vaccine 0.5 milliLiter(s) IntraMuscular once  pantoprazole    Tablet 40 milliGRAM(s) Oral before breakfast  predniSONE   Tablet 10 milliGRAM(s) Oral three times a day  senna 2 Tablet(s) Oral at bedtime    MEDICATIONS  (PRN):      Allergies    No Known Allergies    Intolerances        PERTINENT MEDICATION HISTORY:    SOCIAL HISTORY:non smoker  OCCUPATION:NA  TRAVEL HISTORY: NA    FAMILY HISTORY:  Family history of diabetes mellitus in first degree relative (Father, Mother)  Family history of type C viral hepatitis: Mother      Vital Signs Last 24 Hrs  T(C): 37.2 (20 Oct 2017 07:23), Max: 37.2 (20 Oct 2017 07:23)  T(F): 98.9 (20 Oct 2017 07:23), Max: 98.9 (20 Oct 2017 07:23)  HR: 100 (20 Oct 2017 07:23) (100 - 108)  BP: 143/82 (20 Oct 2017 07:23) (134/96 - 154/94)  BP(mean): --  RR: 18 (20 Oct 2017 07:23) (18 - 24)  SpO2: 100% (20 Oct 2017 07:23) (100% - 100%)    Daily     Daily     Physical Exam:  General: mild distress  HEENT: EOMI, dry oral mucosa  CVS: +S1/S2, tachycardic  Resp: CTA b/l.   GI: Soft, NT/ND +BS  MSK: no synovitis  Neuro: AAOx3  Skin: no visible rashes    LABS:                        9.1    5.99  )-----------( 260      ( 20 Oct 2017 01:20 )             29.6     10-20    141  |  104  |  42<H>  ----------------------------<  139<H>  5.4<H>   |  26  |  1.59<H>    Ca    8.2<L>      20 Oct 2017 07:10  Phos  4.3     10-20  Mg     1.3     10-20    TPro  6.7  /  Alb  3.9  /  TBili  0.4  /  DBili  x   /  AST  28  /  ALT  35<H>  /  AlkPhos  63  10-20    PT/INR - ( 20 Oct 2017 07:03 )   PT: 10.2 SEC;   INR: 0.91          PTT - ( 20 Oct 2017 07:03 )  PTT:23.2 SEC  Urinalysis Basic - ( 20 Oct 2017 02:22 )    Color: PLYEL / Appearance: CLEAR / S.012 / pH: 6.0  Gluc: 500 / Ketone: NEGATIVE  / Bili: NEGATIVE / Urobili: NORMAL E.U.   Blood: NEGATIVE / Protein: 10 / Nitrite: NEGATIVE   Leuk Esterase: NEGATIVE / RBC: 0-2 / WBC 0-2   Sq Epi: OCC / Non Sq Epi: x / Bacteria: x        RADIOLOGY & ADDITIONAL STUDIES:    EXAM:  NM PULM VENTILATION PERFUS IMG        PROCEDURE DATE:  Oct 20 2017       INTERPRETATION:  RADIOPHARMACEUTICAL: 1.0 mCi Tc-99m-DTPA via inhalation;   6.5 mCi Tc-99m-MAA, I.V.    CLINICAL INFORMATION: 47 year old female with shortness of breath;   referred to evaluate for pulmonary embolism.    TECHNIQUE:  Ventilation and perfusion images of the lungs were obtained   following administration of Tc-99m-DTPA and Tc-99m-MAA. Images were   obtained in the anterior, posterior, both lateral, andall 4 oblique   projections. The study was interpreted in conjunction with a chest   radiograph of 10/20/2017.    COMPARISON: No previous lung scans were available for comparison.    FINDINGS: There is normal distribution of radiopharmaceutical in both   lungs on the ventilation and perfusion images. There are no perfusion   defects in either lung.    IMPRESSION: Normal ventilation/perfusion lung scan. No radionuclide   evidence of pulmonary embolus.    < from: Xray Chest 2 Views PA/Lat (10.20. @ 01:36) >    EXAM:  RAD CHEST PA LAT        PROCEDURE DATE:  Oct 20 2017         INTERPRETATION:  CLINICAL INFORMATION: Sarcoidosis. Dyspnea.    TIME OF EXAMINATION: 10/20/2017 at 1:37 AM    EXAM: PA and Lateral Chest    FINDINGS:      The heart is normal in size. There is no focal consolidation,   pneumothorax, or pleural effusion.  Mild interstitial opacities at both lung bases with improvement of the   overall changes since the last exam.     COMPARISON: Chest radiograph [id number].    IMPRESSION: No acutepulmonary pathology.    < end of copied text > LEONEL BEVERLY  [id number]    HISTORY OF PRESENT ILLNESS:    47yoF hx of sarcoidosis (dx 2010 via biopsy showing noncaseating granulomas), T2DM sent in from our office after evaluation of post hospitalization.    She reports SOB with or wo exertion - feels at baseline doesn't have enough oxygen.  she also has intermittent productive cough (green at times), intermittent chest pain substernal with no specific activity - exertion or with supine position, LE edema and b/l knee and ankle pain with ankle joint swelling Also reports some facial edema but no fever or rash.  She further reported elev BP at home with diastolic in the 100s.  She reports orthopnea with needing 3 pillows to sleep    She was previously hospitalized 17 for abnormal labs showing MIKALA and hypercalcemia, in setting of subacute-to-acute sx/sx including weight loss, night sweats, and dyspnea on exertion. Pt on admission () had Ca+ 15.3, creatinine 5.65, diffuse lymphadenopathy on physical exam and CT chest.  s/p lymph node bx on  confirming sarcoidosis.    Currently, in the ED with dyspnea, diffuse arthalgia and myalgia.  Reports she didn't take her prednisone yesterday and has yet to receive today's dose totaling 48 hours off of prednisone.    Denies any fever, rash, sore throat, n/v or any other complaints on ROS.    PAST MEDICAL & SURGICAL HISTORY:  Diabetes  Sarcoidosis  No significant past surgical history      Review of Systems:  Gen:  +fatigue, no fevers/chills, weight loss  HEENT: No blurry vision, no difficulty swallowing  CVS: No chest pain/palpitations  Resp: + SOB, cough; no wheezing  GI: No N/V/C/D/abdominal pain  MSK: diffuse arthralgias with increased pain in b/l ankle pain, diffuse myalgia  Skin: No new rashes  Neuro: No headaches    MEDICATIONS  (STANDING):  docusate sodium 100 milliGRAM(s) Oral daily  heparin  Injectable 5000 Unit(s) SubCutaneous every 8 hours  influenza   Vaccine 0.5 milliLiter(s) IntraMuscular once  pantoprazole    Tablet 40 milliGRAM(s) Oral before breakfast  predniSONE   Tablet 10 milliGRAM(s) Oral three times a day  senna 2 Tablet(s) Oral at bedtime    MEDICATIONS  (PRN):      Allergies    No Known Allergies    Intolerances        PERTINENT MEDICATION HISTORY:    SOCIAL HISTORY:non smoker  OCCUPATION:NA  TRAVEL HISTORY: NA    FAMILY HISTORY:  Family history of diabetes mellitus in first degree relative (Father, Mother)  Family history of type C viral hepatitis: Mother      Vital Signs Last 24 Hrs  T(C): 37.2 (20 Oct 2017 07:23), Max: 37.2 (20 Oct 2017 07:23)  T(F): 98.9 (20 Oct 2017 07:23), Max: 98.9 (20 Oct 2017 07:23)  HR: 100 (20 Oct 2017 07:23) (100 - 108)  BP: 143/82 (20 Oct 2017 07:23) (134/96 - 154/94)  BP(mean): --  RR: 18 (20 Oct 2017 07:23) (18 - 24)  SpO2: 100% (20 Oct 2017 07:23) (100% - 100%)    Daily     Daily     Physical Exam:  General: mild distress  HEENT: EOMI, dry oral mucosa  CVS: +S1/S2,   Resp: CTA b/l.   GI: Soft, NT/ND +BS  MSK: no synovitis  Neuro: AAOx3  Skin: no visible rashes    LABS:                        9.1    5.99  )-----------( 260      ( 20 Oct 2017 01:20 )             29.6     10-20    141  |  104  |  42<H>  ----------------------------<  139<H>  5.4<H>   |  26  |  1.59<H>    Ca    8.2<L>      20 Oct 2017 07:10  Phos  4.3     10-20  Mg     1.3     10-20    TPro  6.7  /  Alb  3.9  /  TBili  0.4  /  DBili  x   /  AST  28  /  ALT  35<H>  /  AlkPhos  63  10-20    PT/INR - ( 20 Oct 2017 07:03 )   PT: 10.2 SEC;   INR: 0.91          PTT - ( 20 Oct 2017 07:03 )  PTT:23.2 SEC  Urinalysis Basic - ( 20 Oct 2017 02:22 )    Color: PLYEL / Appearance: CLEAR / S.012 / pH: 6.0  Gluc: 500 / Ketone: NEGATIVE  / Bili: NEGATIVE / Urobili: NORMAL E.U.   Blood: NEGATIVE / Protein: 10 / Nitrite: NEGATIVE   Leuk Esterase: NEGATIVE / RBC: 0-2 / WBC 0-2   Sq Epi: OCC / Non Sq Epi: x / Bacteria: x        RADIOLOGY & ADDITIONAL STUDIES:    EXAM:  NM PULM VENTILATION PERFUS IMG        PROCEDURE DATE:  Oct 20 2017       INTERPRETATION:  RADIOPHARMACEUTICAL: 1.0 mCi Tc-99m-DTPA via inhalation;   6.5 mCi Tc-99m-MAA, I.V.    CLINICAL INFORMATION: 47 year old female with shortness of breath;   referred to evaluate for pulmonary embolism.    TECHNIQUE:  Ventilation and perfusion images of the lungs were obtained   following administration of Tc-99m-DTPA and Tc-99m-MAA. Images were   obtained in the anterior, posterior, both lateral, andall 4 oblique   projections. The study was interpreted in conjunction with a chest   radiograph of 10/20/2017.    COMPARISON: No previous lung scans were available for comparison.    FINDINGS: There is normal distribution of radiopharmaceutical in both   lungs on the ventilation and perfusion images. There are no perfusion   defects in either lung.    IMPRESSION: Normal ventilation/perfusion lung scan. No radionuclide   evidence of pulmonary embolus.    < from: Xray Chest 2 Views PA/Lat (10.20.17 @ 01:36) >    EXAM:  RAD CHEST PA LAT        PROCEDURE DATE:  Oct 20 2017         INTERPRETATION:  CLINICAL INFORMATION: Sarcoidosis. Dyspnea.    TIME OF EXAMINATION: 10/20/2017 at 1:37 AM    EXAM: PA and Lateral Chest    FINDINGS:      The heart is normal in size. There is no focal consolidation,   pneumothorax, or pleural effusion.  Mild interstitial opacities at both lung bases with improvement of the   overall changes since the last exam.     COMPARISON: Chest radiograph [id number].    IMPRESSION: No acutepulmonary pathology.    < end of copied text >

## 2017-10-20 NOTE — H&P ADULT - PROBLEM SELECTOR PLAN 3
- A1C 6.9 on 9/27  - FS since admission WNL  - given that pt is on high dose steroids will give low dose ISS  - will closely monitor FS

## 2017-10-20 NOTE — H&P ADULT - PROBLEM SELECTOR PLAN 1
- Pt with 2-3 progressive dyspnea on exertion  - likely 2/2 to sarcoidosis  - pt complaining of YULISSA, orthopnea, and chest tightness  - given that pt was on high dose steroid taper, with no antibiotic prophylaxis, there is concern for possible infectious etiology  - Infectious etiology however is unlikely given normal WBC count, no fever, and negative X-ray  - VQ scan negative for PE  - will r/o cardiac etiology. will trend CE  - t/c CT chest

## 2017-10-20 NOTE — ED ADULT NURSE REASSESSMENT NOTE - NS ED NURSE REASSESS COMMENT FT1
Pt resting comfortably; repeat vitals as noted. Report given to HOOD Main in [facility]. Pt being xfer to [facility].

## 2017-10-20 NOTE — ED PROVIDER NOTE - MEDICAL DECISION MAKING DETAILS
47y f w sarcoidosis sent in by cardiologist for SOB, supposed hemoptysis and hypoxia. Given hx and rheum disorder w recent hospitalization and relative decreased immobilization pt at considerable risk for PE. Will CTA; cxr, labs, likely admit

## 2017-10-20 NOTE — H&P ADULT - NSHPLABSRESULTS_GEN_ALL_CORE
(10-20 @ 01:20)                      9.1  5.99 )-----------( 260                 29.6    Neutrophils = 3.68 (61.5%)  Lymphocytes = 1.03 (17.2%)  Eosinophils = 0.40 (6.7%)  Basophils = 0.02 (0.3%)  Monocytes = 0.53 (8.8%)  Bands = --%    10-20    141  |  104  |  42<H>  ----------------------------<  139<H>  5.4<H>   |  26  |  1.59<H>    Ca    8.2<L>      20 Oct 2017 07:10  Phos  4.3     10-20  Mg     1.3     10-20    TPro  6.7  /  Alb  3.9  /  TBili  0.4  /  DBili  x   /  AST  28  /  ALT  35<H>  /  AlkPhos  63  10-20    ( 20 Oct 2017 07:03 )   PT: 10.2 SEC;   INR: 0.91 ;       PTT:23.2 SEC      RVP:          Tox:         Urinalysis Basic - ( 20 Oct 2017 02:22 )    Color: PLYEL / Appearance: CLEAR / S.012 / pH: 6.0  Gluc: 500 / Ketone: NEGATIVE  / Bili: NEGATIVE / Urobili: NORMAL E.U.   Blood: NEGATIVE / Protein: 10 / Nitrite: NEGATIVE   Leuk Esterase: NEGATIVE / RBC: 0-2 / WBC 0-2   Sq Epi: OCC / Non Sq Epi: x / Bacteria: x      VQ Scan IMPRESSION: Normal ventilation/perfusion lung scan. No radionuclide   evidence of pulmonary embolus.    CXR IMPRESSION: No acute pulmonary pathology.

## 2017-10-20 NOTE — H&P ADULT - ATTENDING COMMENTS
Pt was seen and examined by me on 10/20/17 at 11am.  Case d/w housestaff and I agree with findings and plan as detailed above.    This is a 46 yo F w/ PMH of sarcoidosis, nephrolithiasis, and DM who was sent from rheumatologist's office for worsening SOB.  Pt reports symptoms of ENGLISH and orthopnea, which she says are unchanged since she was discharged 2 weeks ago.  Denies any fevers or chills.  No productive cough.  No recent sick contacts or travel.  She was normoxic on room air and with ambulation of 200 ft.  She remained nonlabored during my exam.  Given recent hospitalization, she underwent V/Q scan which was low probability for PE.  She remains tachycardic but is afebrile without leukocytosis.  Low clinical suspicion for infectious process.  I suspect her SOB is related to sarcoidosis but will observe overnight.  -monitor pulse ox  -c/w steroids  -rheum consult appreciated  -f/u cardiac enzymes to r/o atypical ACS (she did report exertional left sided chest pain)  -DVT ppx

## 2017-10-20 NOTE — H&P ADULT - PROBLEM SELECTOR PLAN 4
- Pt with K of 5.7, and Mg of 1.3 on admission,   - now s/p 30mg Kayexalate   - will check BMP following BM  - will replete Mg   - will continue to monitor

## 2017-10-20 NOTE — H&P ADULT - GASTROINTESTINAL DETAILS
no guarding/no distention/soft/no rebound tenderness/no rigidity/no masses palpable/bowel sounds normal

## 2017-10-20 NOTE — ED PROVIDER NOTE - ATTENDING CONTRIBUTION TO CARE
DR. ALMAGUER, ATTENDING MD-  I performed a face to face bedside interview with patient regarding history of present illness, review of symptoms and past medical history. I completed an independent physical exam.  I have discussed patient's plan of care with the resident.   Documentation as above in the note.    46 y/o female h/o sarcoidosis, dm2, hypercalcemia c/o sob and diffuse myalgias.  Recent admission for same, found to have hypercalcemia, karla, likely complications of sarcoidosis.  Seen by cardiologist today, sent to ED for further eval for concern of worsening sob, hemoptysis.  Denies f/c, ha, neck stiffness, cp, abd pain, n/v/d, dysuria, rash.  Afebrile, appears fatigued, mmm, tachypneic, diminished at bases otherwise ctabil, no retractions, easy wob, s1s2 tachy reg rhythm no m/r/g, abd soft non ttp no r/g, no cva tenderness b/l, no leg swelling b/l, no rash.  Worsening sarcoidosis vs PE vs less likely pna vs ptx.  Obtain cbc, cmp, cxr, ctpa if Cr ok for dye otherwise vq scan as inpatient, will need admission for further w/u and care.

## 2017-10-21 ENCOUNTER — TRANSCRIPTION ENCOUNTER (OUTPATIENT)
Age: 47
End: 2017-10-21

## 2017-10-21 VITALS
SYSTOLIC BLOOD PRESSURE: 142 MMHG | OXYGEN SATURATION: 100 % | RESPIRATION RATE: 18 BRPM | DIASTOLIC BLOOD PRESSURE: 83 MMHG | HEART RATE: 77 BPM | TEMPERATURE: 98 F

## 2017-10-21 LAB
BUN SERPL-MCNC: 45 MG/DL — HIGH (ref 7–23)
CALCIUM SERPL-MCNC: 8.4 MG/DL — SIGNIFICANT CHANGE UP (ref 8.4–10.5)
CHLORIDE SERPL-SCNC: 100 MMOL/L — SIGNIFICANT CHANGE UP (ref 98–107)
CK MB BLD-MCNC: 1 NG/ML — SIGNIFICANT CHANGE UP (ref 1–4.7)
CK SERPL-CCNC: 11 U/L — LOW (ref 25–170)
CO2 SERPL-SCNC: 25 MMOL/L — SIGNIFICANT CHANGE UP (ref 22–31)
CREAT SERPL-MCNC: 1.61 MG/DL — HIGH (ref 0.5–1.3)
GLUCOSE BLDC GLUCOMTR-MCNC: 210 MG/DL — HIGH (ref 70–99)
GLUCOSE BLDC GLUCOMTR-MCNC: 215 MG/DL — HIGH (ref 70–99)
GLUCOSE BLDC GLUCOMTR-MCNC: 374 MG/DL — HIGH (ref 70–99)
GLUCOSE SERPL-MCNC: 249 MG/DL — HIGH (ref 70–99)
HCT VFR BLD CALC: 28.2 % — LOW (ref 34.5–45)
HGB BLD-MCNC: 8.9 G/DL — LOW (ref 11.5–15.5)
MAGNESIUM SERPL-MCNC: 1.7 MG/DL — SIGNIFICANT CHANGE UP (ref 1.6–2.6)
MCHC RBC-ENTMCNC: 26.5 PG — LOW (ref 27–34)
MCHC RBC-ENTMCNC: 31.6 % — LOW (ref 32–36)
MCV RBC AUTO: 83.9 FL — SIGNIFICANT CHANGE UP (ref 80–100)
NRBC # FLD: 0 — SIGNIFICANT CHANGE UP
PHOSPHATE SERPL-MCNC: 4.9 MG/DL — HIGH (ref 2.5–4.5)
PLATELET # BLD AUTO: 190 K/UL — SIGNIFICANT CHANGE UP (ref 150–400)
PMV BLD: 8.6 FL — SIGNIFICANT CHANGE UP (ref 7–13)
POTASSIUM SERPL-MCNC: 5.4 MMOL/L — HIGH (ref 3.5–5.3)
POTASSIUM SERPL-SCNC: 5.4 MMOL/L — HIGH (ref 3.5–5.3)
RBC # BLD: 3.36 M/UL — LOW (ref 3.8–5.2)
RBC # FLD: 15.8 % — HIGH (ref 10.3–14.5)
SODIUM SERPL-SCNC: 141 MMOL/L — SIGNIFICANT CHANGE UP (ref 135–145)
TROPONIN T SERPL-MCNC: < 0.06 NG/ML — SIGNIFICANT CHANGE UP (ref 0–0.06)
WBC # BLD: 5.13 K/UL — SIGNIFICANT CHANGE UP (ref 3.8–10.5)
WBC # FLD AUTO: 5.13 K/UL — SIGNIFICANT CHANGE UP (ref 3.8–10.5)

## 2017-10-21 PROCEDURE — 99223 1ST HOSP IP/OBS HIGH 75: CPT | Mod: GC

## 2017-10-21 PROCEDURE — 99239 HOSP IP/OBS DSCHRG MGMT >30: CPT

## 2017-10-21 RX ORDER — DOCUSATE SODIUM 100 MG
1 CAPSULE ORAL
Qty: 0 | Refills: 0 | COMMUNITY
Start: 2017-10-21

## 2017-10-21 RX ORDER — SODIUM POLYSTYRENE SULFONATE 4.1 MEQ/G
30 POWDER, FOR SUSPENSION ORAL ONCE
Qty: 0 | Refills: 0 | Status: COMPLETED | OUTPATIENT
Start: 2017-10-21 | End: 2017-10-21

## 2017-10-21 RX ORDER — DOCUSATE SODIUM 100 MG
1 CAPSULE ORAL
Qty: 0 | Refills: 0 | DISCHARGE
Start: 2017-10-21

## 2017-10-21 RX ORDER — PANTOPRAZOLE SODIUM 20 MG/1
1 TABLET, DELAYED RELEASE ORAL
Qty: 10 | Refills: 0 | OUTPATIENT
Start: 2017-10-21 | End: 2017-10-31

## 2017-10-21 RX ORDER — SODIUM POLYSTYRENE SULFONATE 4.1 MEQ/G
30 POWDER, FOR SUSPENSION ORAL ONCE
Qty: 0 | Refills: 0 | Status: DISCONTINUED | OUTPATIENT
Start: 2017-10-21 | End: 2017-10-21

## 2017-10-21 RX ORDER — SENNA PLUS 8.6 MG/1
2 TABLET ORAL
Qty: 0 | Refills: 0 | COMMUNITY
Start: 2017-10-21

## 2017-10-21 RX ADMIN — HEPARIN SODIUM 5000 UNIT(S): 5000 INJECTION INTRAVENOUS; SUBCUTANEOUS at 05:11

## 2017-10-21 RX ADMIN — PANTOPRAZOLE SODIUM 40 MILLIGRAM(S): 20 TABLET, DELAYED RELEASE ORAL at 05:11

## 2017-10-21 RX ADMIN — Medication 10: at 16:59

## 2017-10-21 RX ADMIN — Medication 4: at 12:45

## 2017-10-21 RX ADMIN — SODIUM POLYSTYRENE SULFONATE 30 GRAM(S): 4.1 POWDER, FOR SUSPENSION ORAL at 11:58

## 2017-10-21 RX ADMIN — Medication 30 MILLIGRAM(S): at 05:10

## 2017-10-21 RX ADMIN — Medication 4: at 08:49

## 2017-10-21 NOTE — DISCHARGE NOTE ADULT - PLAN OF CARE
Improvement You originally came into the hospital due to shortness of breath. You were tested for both infectious causes and clots in your lungs with no evidence of either. It is thought that your SOB was caused by anxiety and missing some doses of your steroids. Please follow up with your PCP within 1 week of discharge regarding need for anxiolytics. Follow up with both your Pulmonologist and You originally came into the hospital due to shortness of breath. You were tested for both infectious causes and clots in your lungs with no evidence of either. It is thought that your SOB was caused by anxiety and missing some doses of your steroids. Please follow up with your PCP within 1 week of discharge regarding need for anxiolytics. Follow up with both your Pulmonologist and Rheumatologist within 1 week of discharge for further management of your Sarcoidosis. Follow up You were followed by Rheumatology for your diagnosis of Sarcoidosis, and recommended you take 60mg of Prednisone daily. Continue to take your Prednisone as prescribed and follow up with your Rheumatologist within one week of discharge. Blood sugar control You were given insulin while you were admitted in order to control your blood sugar. You are on high doses of steroids, which will cause your blood sugar to rise. Please follow up with your PCP regarding starting medications to control your blood sugar while you are on steroids.

## 2017-10-21 NOTE — PROGRESS NOTE ADULT - SUBJECTIVE AND OBJECTIVE BOX
Patient is a 47y old  Female who presents with a chief complaint of SOB (20 Oct 2017 11:22)      SUBJECTIVE / OVERNIGHT EVENTS:  Pt had LY overnight. Pt complaining of continued b/l ankle pain this AM. Pt denies any chest pain, palpitations SOB at rest, abdominal pain, N/V/D/C, dysuria, hematuria    MEDICATIONS  (STANDING):  dextrose 5%. 1000 milliLiter(s) (50 mL/Hr) IV Continuous <Continuous>  dextrose 50% Injectable 12.5 Gram(s) IV Push once  dextrose 50% Injectable 25 Gram(s) IV Push once  dextrose 50% Injectable 25 Gram(s) IV Push once  docusate sodium 100 milliGRAM(s) Oral daily  heparin  Injectable 5000 Unit(s) SubCutaneous every 8 hours  influenza   Vaccine 0.5 milliLiter(s) IntraMuscular once  insulin lispro (HumaLOG) corrective regimen sliding scale   SubCutaneous three times a day before meals  insulin lispro (HumaLOG) corrective regimen sliding scale   SubCutaneous at bedtime  methylPREDNISolone sodium succinate Injectable 30 milliGRAM(s) IV Push two times a day  pantoprazole    Tablet 40 milliGRAM(s) Oral before breakfast  polyethylene glycol 3350 17 Gram(s) Oral daily  senna 2 Tablet(s) Oral at bedtime    MEDICATIONS  (PRN):  dextrose Gel 1 Dose(s) Oral once PRN Blood Glucose LESS THAN 70 milliGRAM(s)/deciliter  glucagon  Injectable 1 milliGRAM(s) IntraMuscular once PRN Glucose LESS THAN 70 milligrams/deciliter      OBJECTIVE:    Vital Signs Last 24 Hrs  T(C): 36.8 (21 Oct 2017 04:34), Max: 37.3 (20 Oct 2017 14:17)  T(F): 98.2 (21 Oct 2017 04:34), Max: 99.2 (20 Oct 2017 14:17)  HR: 93 (21 Oct 2017 04:34) (93 - 106)  BP: 135/85 (21 Oct 2017 04:34) (115/76 - 135/85)  BP(mean): --  RR: 18 (21 Oct 2017 04:34) (18 - 18)  SpO2: 100% (21 Oct 2017 04:34) (100% - 100%)    CAPILLARY BLOOD GLUCOSE      POCT Blood Glucose.: 210 mg/dL (21 Oct 2017 08:19)  POCT Blood Glucose.: 432 mg/dL (20 Oct 2017 21:43)  POCT Blood Glucose.: 467 mg/dL (20 Oct 2017 21:23)  POCT Blood Glucose.: 321 mg/dL (20 Oct 2017 18:23)  POCT Blood Glucose.: 171 mg/dL (20 Oct 2017 13:07)    I&O's Summary      PHYSICAL EXAM:  GENERAL: NAD, well-developed  HEAD:  Atraumatic, Normocephalic  EYES: EOMI, PERRLA, conjunctiva and sclera clear  NECK: Supple, No JVD  CHEST/LUNG: Clear to auscultation bilaterally; No wheeze  HEART: Regular rate and rhythm; No murmurs, rubs, or gallops  ABDOMEN: Soft, Nontender, Nondistended; Bowel sounds present  EXTREMITIES:  2+ Peripheral Pulses, No clubbing, cyanosis, or edema  PSYCH: AAOx3  NEUROLOGY: non-focal  SKIN: No rashes or lesions    LABS:                        9.1    5.99  )-----------( 260      ( 20 Oct 2017 01:20 )             29.6     Auto Eosinophil # 0.40  / Auto Eosinophil % 6.7   / Auto Neutrophil # 3.68  / Auto Neutrophil % 61.5  / BANDS % x        10    139  |  101  |  39<H>  ----------------------------<  205<H>  5.3   |  25  |  1.59<H>  10    141  |  104  |  42<H>  ----------------------------<  139<H>  5.4<H>   |  26  |  1.59<H>  10-20    141  |  104  |  49<H>  ----------------------------<  190<H>  5.7<H>   |  25  |  1.78<H>    Ca    8.3<L>      20 Oct 2017 17:00  Mg     1.3     10-20  Phos  4.3     10-20  TPro  6.7  /  Alb  3.9  /  TBili  0.4  /  DBili  x   /  AST  28  /  ALT  35<H>  /  AlkPhos  63  10-    PT/INR - ( 20 Oct 2017 07:03 )   PT: 10.2 SEC;   INR: 0.91          PTT - ( 20 Oct 2017 07:03 )  PTT:23.2 SEC  CARDIAC MARKERS ( 20 Oct 2017 23:15 )  x     / < 0.06 ng/mL / 11 u/L / 1.00 ng/mL / x      CARDIAC MARKERS ( 20 Oct 2017 17:00 )  x     / < 0.06 ng/mL / 11 u/L / 1.00 ng/mL / x          Urinalysis Basic - ( 20 Oct 2017 02:22 )    Color: PLYEL / Appearance: CLEAR / S.012 / pH: 6.0  Gluc: 500 / Ketone: NEGATIVE  / Bili: NEGATIVE / Urobili: NORMAL E.U.   Blood: NEGATIVE / Protein: 10 / Nitrite: NEGATIVE   Leuk Esterase: NEGATIVE / RBC: 0-2 / WBC 0-2   Sq Epi: OCC / Non Sq Epi: x / Bacteria: x          RESPIRATORY  VENT:    ABG:     VBG:     RADIOLOGY & ADDITIONAL TESTS:  (Imaging Personally Reviewed)    Consultant(s) Notes Reviewed:      Care Discussed with Consultants/Other Providers:

## 2017-10-21 NOTE — DISCHARGE NOTE ADULT - NS AS ACTIVITY OBS
Sex allowed/Walking-Outdoors allowed/Stairs allowed/Showering allowed/Driving allowed/Walking-Indoors allowed/Return to Work/School allowed/Bathing allowed

## 2017-10-21 NOTE — CONSULT NOTE ADULT - ATTENDING COMMENTS
Patient seen and examined. Agree with above  Reviewed CT chest, V/Q scan, dopplers and CXR. Stable chronic fibrosis with traction bronchiectasis and honeycombing, no evidence of new opacity or fluid.  Patient states she missed 2 doses of her Prednisone this past week. Feeling much better since receiving Soulmedrol.  Not hypoxic. +Chronic dry cough.  Encouraged her to ambulate.  Significant anxiety - I spoke with her about this at length- recommend slow deep breaths, purse lip breathing. She will follow up with her PMD regarding anxiolytics.  From my standpoint she can be discharged home with close follow up. She has a Pulmonary follow up appointment on Oct 31st. Patient seen and examined. Agree with above  Reviewed CT chest, V/Q scan, dopplers and CXR. Stable chronic fibrosis with traction bronchiectasis and honeycombing, no evidence of new opacity or fluid.  Patient states she missed 2 doses of her Prednisone this past week. Feeling much better since receiving Soulmedrol.  Not hypoxic. +Chronic dry cough.  Encouraged her to ambulate.  Significant anxiety - I spoke with her about this at length- recommend slow deep breaths, purse lip breathing. She will follow up with her PMD regarding anxiolytics.  From a pulmonary standpoint she can be discharged home with close follow up. She has a Pulmonary follow up appointment on Oct 31st.

## 2017-10-21 NOTE — PROGRESS NOTE ADULT - ATTENDING COMMENTS
patient seen and examine at bed side   agree with above plan and management    46 yo F with PMH of sarcoidosis (on prednisone taper), nephrolithiasis, and DM (A1C 9/27 of 6.9) presenting here with 2-3 days for ENGLISH.  continue steroids  f/u with rheuma and pulmonary as out patient   dc home today

## 2017-10-21 NOTE — DISCHARGE NOTE ADULT - MEDICATION SUMMARY - MEDICATIONS TO STOP TAKING
I will STOP taking the medications listed below when I get home from the hospital:    predniSONE 10 mg oral tablet  -- 1 tab(s) by mouth 3 times a day

## 2017-10-21 NOTE — DISCHARGE NOTE ADULT - PROVIDER TOKENS
TOKEN:'[id number]',FREE:[LAST:[Brenton],FIRST:[Sp Luna],PHONE:[phone]],FAX:[(   [phone]    -],ADDRESS:[General Internal Medicine   [address] [place] [address], [address] [place] [id number]]],TOKEN:'[id number]'

## 2017-10-21 NOTE — CONSULT NOTE ADULT - SUBJECTIVE AND OBJECTIVE BOX
CHIEF COMPLAINT:    HPI:    PAST MEDICAL & SURGICAL HISTORY:  Diabetes  Sarcoidosis  No significant past surgical history      FAMILY HISTORY:  Family history of diabetes mellitus in first degree relative (Father, Mother)  Family history of type C viral hepatitis: Mother      SOCIAL HISTORY:  Smoking: [ ] Never Smoked [ ] Former Smoker (__ packs x ___ years) [ ] Current Smoker  (__ packs x ___ years)  Substance Use: [ ] Never Used [ ] Used ____  EtOH Use:  Marital Status: [ ] Single [ ]  [ ]  [ ]   Occupation:  Recent Travel:  Country of Birth:  Advance Directives:    Allergies    No Known Allergies    Intolerances        HOME MEDICATIONS:    REVIEW OF SYSTEMS:  Constitutional: [ ] fevers [ ] chills [ ] weight loss [ ] weight gain  HEENT: [ ] dry eyes [ ] eye irritation [ ] postnasal drip [ ] nasal congestion  CV: [ ] chest pain [ ] orthopnea [ ] palpitations [ ] murmur  Resp: [ ] cough [ ] shortness of breath [ ] wheezing [ ] sputum [ ] hemoptysis  GI: [ ] nausea [ ] vomiting [ ] diarrhea [ ] constipation [ ] abd pain [ ] dysphagia   : [ ] dysuria [ ] nocturia [ ] hematuria [ ] increased urinary frequency  Musculoskeletal: [ ] myalgias [ ] arthralgias   Skin: [ ] rash [ ] itch  Neurological: [ ] headache [ ] dizziness [ ] syncope [ ] weakness [ ] numbness  Psychiatric: [ ] anxiety [ ] depression  Endocrine: [ ] diabetes [ ] thyroid problem  Hematologic/Lymphatic: [ ] anemia [ ] bleeding problem  Allergic/Immunologic: [ ] itchy eyes [ ] nasal discharge [ ] hives [ ] angioedema  [ ] All other systems negative  [ ] Unable to assess ROS because ________    OBJECTIVE:  ICU Vital Signs Last 24 Hrs  T(C): 36.9 (21 Oct 2017 11:20), Max: 37.3 (20 Oct 2017 14:17)  T(F): 98.4 (21 Oct 2017 11:20), Max: 99.2 (20 Oct 2017 14:17)  HR: 77 (21 Oct 2017 11:20) (77 - 106)  BP: 142/83 (21 Oct 2017 11:20) (115/76 - 142/83)  BP(mean): --  ABP: --  ABP(mean): --  RR: 18 (21 Oct 2017 11:20) (18 - 18)  SpO2: 100% (21 Oct 2017 11:20) (100% - 100%)        CAPILLARY BLOOD GLUCOSE  175 (20 Oct 2017 00:08)      POCT Blood Glucose.: 210 mg/dL (21 Oct 2017 08:19)      PHYSICAL EXAM:  General:  NAD  HEENT:  NC/AT  Lymph Nodes: No cervical or supraclavicular lymphadenopathy   Neck: Supple  Respiratory:  CTA B/L, no wheezes, crackles or rhonchi  Cardiovascular:  RRR, no m/r/g  Abdomen: soft, NT/ND, +BS  Extremities: no clubbing, cyanosis or edema, warm  Skin: no rash  Neurological: AAOx3, no focal deficits  Psychiatry: not anxious, normal affect    HOSPITAL MEDICATIONS:  MEDICATIONS  (STANDING):  dextrose 5%. 1000 milliLiter(s) (50 mL/Hr) IV Continuous <Continuous>  dextrose 50% Injectable 12.5 Gram(s) IV Push once  dextrose 50% Injectable 25 Gram(s) IV Push once  dextrose 50% Injectable 25 Gram(s) IV Push once  docusate sodium 100 milliGRAM(s) Oral daily  heparin  Injectable 5000 Unit(s) SubCutaneous every 8 hours  influenza   Vaccine 0.5 milliLiter(s) IntraMuscular once  insulin lispro (HumaLOG) corrective regimen sliding scale   SubCutaneous three times a day before meals  insulin lispro (HumaLOG) corrective regimen sliding scale   SubCutaneous at bedtime  methylPREDNISolone sodium succinate Injectable 30 milliGRAM(s) IV Push two times a day  pantoprazole    Tablet 40 milliGRAM(s) Oral before breakfast  polyethylene glycol 3350 17 Gram(s) Oral daily  senna 2 Tablet(s) Oral at bedtime    MEDICATIONS  (PRN):  dextrose Gel 1 Dose(s) Oral once PRN Blood Glucose LESS THAN 70 milliGRAM(s)/deciliter  glucagon  Injectable 1 milliGRAM(s) IntraMuscular once PRN Glucose LESS THAN 70 milligrams/deciliter      LABS:                        8.9    5.13  )-----------( 190      ( 21 Oct 2017 10:00 )             28.2     Hgb Trend: 8.9<--, 9.1<--  10-21    141  |  100  |  45<H>  ----------------------------<  249<H>  5.4<H>   |  25  |  1.61<H>    Ca    8.4      21 Oct 2017 10:00  Phos  4.9     10-21  Mg     1.7     10-21    TPro  6.7  /  Alb  3.9  /  TBili  0.4  /  DBili  x   /  AST  28  /  ALT  35<H>  /  AlkPhos  63  10-20    Creatinine Trend: 1.61<--, 1.59<--, 1.59<--, 1.78<--, 3.48<--, 3.71<--  PT/INR - ( 20 Oct 2017 07:03 )   PT: 10.2 SEC;   INR: 0.91          PTT - ( 20 Oct 2017 07:03 )  PTT:23.2 SEC  Urinalysis Basic - ( 20 Oct 2017 02:22 )    Color: PLYEL / Appearance: CLEAR / S.012 / pH: 6.0  Gluc: 500 / Ketone: NEGATIVE  / Bili: NEGATIVE / Urobili: NORMAL E.U.   Blood: NEGATIVE / Protein: 10 / Nitrite: NEGATIVE   Leuk Esterase: NEGATIVE / RBC: 0-2 / WBC 0-2   Sq Epi: OCC / Non Sq Epi: x / Bacteria: x            MICROBIOLOGY:     RADIOLOGY:  [ ] Reviewed and interpreted by me    PULMONARY FUNCTION TESTS:    EKG:    Please call [phone] between 8am-pm weekdays, [phone] after hours and weekends. CHIEF COMPLAINT:  SOB    HPI:  46 yo F with PMH of sarcoidosis (on prednisone taper), nephrolithiasis, and DM (A1C  of 6.9) presenting here with 2-3 days for ENGLISH.    Recently hospitalized and treated for a flare of her sarcoidosis and treated with prednisone taper.    She returns with worsening shortness of breath.  Patient endorses chronic dry cough but no fevers.      PAST MEDICAL & SURGICAL HISTORY:  Diabetes  Sarcoidosis  No significant past surgical history      FAMILY HISTORY:  Family history of diabetes mellitus in first degree relative (Father, Mother)  Family history of type C viral hepatitis: Mother      SOCIAL HISTORY:  Smoking: [x ] Never Smoked [ ] Former Smoker (__ packs x ___ years) [ ] Current Smoker  (__ packs x ___ years)  Substance Use: [ x] Never Used [ ] Used ____    Allergies    No Known Allergies    Intolerances    HOME MEDICATIONS:  · 	senna oral tablet: 2 tab(s) orally once a day (at bedtime)  · 	docusate sodium 100 mg oral capsule: 1 cap(s) orally 3 times a day  · 	predniSONE 50 mg oral tablet: 1 tab(s) orally once a day   · 	pantoprazole 40 mg oral delayed release tablet: 1 tab(s) orally once a day (before a meal)    .    REVIEW OF SYSTEMS:  Constitutional: [ ] fevers [ ] chills [ ] weight loss [ ] weight gain  HEENT: [ ] dry eyes [ ] eye irritation [ ] postnasal drip [ ] nasal congestion  CV: [ ] chest pain [ ] orthopnea [ ] palpitations [ ] murmur  Resp: [ ] cough [ x] shortness of breath [ ] wheezing [ ] sputum [ ] hemoptysis  GI: [ ] nausea [ ] vomiting [ ] diarrhea [ ] constipation [ ] abd pain [ ] dysphagia   : [ ] dysuria [ ] nocturia [ ] hematuria [ ] increased urinary frequency  Musculoskeletal: [ ] myalgias [ ] arthralgias   Skin: [ ] rash [ ] itch  Neurological: [ ] headache [ ] dizziness [ ] syncope [ ] weakness [ ] numbness  Psychiatric: [ ] anxiety [ ] depression  Endocrine: [ ] diabetes [ ] thyroid problem  Hematologic/Lymphatic: [ ] anemia [ ] bleeding problem  Allergic/Immunologic: [ ] itchy eyes [ ] nasal discharge [ ] hives [ ] angioedema  [x ] All other systems negative  [ ] Unable to assess ROS because ________    OBJECTIVE:  ICU Vital Signs Last 24 Hrs  T(C): 36.9 (21 Oct 2017 11:20), Max: 37.3 (20 Oct 2017 14:17)  T(F): 98.4 (21 Oct 2017 11:20), Max: 99.2 (20 Oct 2017 14:17)  HR: 77 (21 Oct 2017 11:20) (77 - 106)  BP: 142/83 (21 Oct 2017 11:20) (115/76 - 142/83)  BP(mean): --  ABP: --  ABP(mean): --  RR: 18 (21 Oct 2017 11:20) (18 - 18)  SpO2: 100% (21 Oct 2017 11:20) (100% - 100%)        CAPILLARY BLOOD GLUCOSE  175 (20 Oct 2017 00:08)      POCT Blood Glucose.: 210 mg/dL (21 Oct 2017 08:19)      PHYSICAL EXAM:  General:  NAD  HEENT:  NC/AT  Lymph Nodes: No cervical or supraclavicular lymphadenopathy   Neck: Supple  Respiratory:  CTA B/L, no wheezes, crackles or rhonchi  Cardiovascular:  RRR, no m/r/g  Abdomen: soft, NT/ND, +BS  Extremities: no clubbing, cyanosis or edema, warm  Skin: no rash  Neurological: AAOx3, no focal deficits  Psychiatry: not anxious, normal affect    HOSPITAL MEDICATIONS:  MEDICATIONS  (STANDING):  dextrose 5%. 1000 milliLiter(s) (50 mL/Hr) IV Continuous <Continuous>  dextrose 50% Injectable 12.5 Gram(s) IV Push once  dextrose 50% Injectable 25 Gram(s) IV Push once  dextrose 50% Injectable 25 Gram(s) IV Push once  docusate sodium 100 milliGRAM(s) Oral daily  heparin  Injectable 5000 Unit(s) SubCutaneous every 8 hours  influenza   Vaccine 0.5 milliLiter(s) IntraMuscular once  insulin lispro (HumaLOG) corrective regimen sliding scale   SubCutaneous three times a day before meals  insulin lispro (HumaLOG) corrective regimen sliding scale   SubCutaneous at bedtime  methylPREDNISolone sodium succinate Injectable 30 milliGRAM(s) IV Push two times a day  pantoprazole    Tablet 40 milliGRAM(s) Oral before breakfast  polyethylene glycol 3350 17 Gram(s) Oral daily  senna 2 Tablet(s) Oral at bedtime    MEDICATIONS  (PRN):  dextrose Gel 1 Dose(s) Oral once PRN Blood Glucose LESS THAN 70 milliGRAM(s)/deciliter  glucagon  Injectable 1 milliGRAM(s) IntraMuscular once PRN Glucose LESS THAN 70 milligrams/deciliter      LABS:                        8.9    5.13  )-----------( 190      ( 21 Oct 2017 10:00 )             28.2     Hgb Trend: 8.9<--, 9.1<--  10-21    141  |  100  |  45<H>  ----------------------------<  249<H>  5.4<H>   |  25  |  1.61<H>    Ca    8.4      21 Oct 2017 10:00  Phos  4.9     10-21  Mg     1.7     10-21    TPro  6.7  /  Alb  3.9  /  TBili  0.4  /  DBili  x   /  AST  28  /  ALT  35<H>  /  AlkPhos  63  10-20    Creatinine Trend: 1.61<--, 1.59<--, 1.59<--, 1.78<--, 3.48<--, 3.71<--  PT/INR - ( 20 Oct 2017 07:03 )   PT: 10.2 SEC;   INR: 0.91          PTT - ( 20 Oct 2017 07:03 )  PTT:23.2 SEC  Urinalysis Basic - ( 20 Oct 2017 02:22 )    Color: PLYEL / Appearance: CLEAR / S.012 / pH: 6.0  Gluc: 500 / Ketone: NEGATIVE  / Bili: NEGATIVE / Urobili: NORMAL E.U.   Blood: NEGATIVE / Protein: 10 / Nitrite: NEGATIVE   Leuk Esterase: NEGATIVE / RBC: 0-2 / WBC 0-2   Sq Epi: OCC / Non Sq Epi: x / Bacteria: x        RADIOLOGY:  [x ] Reviewed and interpreted by me        Please call [phone] between 8am-pm weekdays, [phone] after hours and weekends.

## 2017-10-21 NOTE — DISCHARGE NOTE ADULT - MEDICATION SUMMARY - MEDICATIONS TO TAKE
I will START or STAY ON the medications listed below when I get home from the hospital:    predniSONE 20 mg oral tablet  -- 1 tab(s) by mouth 2 times a day   -- It is very important that you take or use this exactly as directed.  Do not skip doses or discontinue unless directed by your doctor.  Obtain medical advice before taking any non-prescription drugs as some may affect the action of this medication.  Take with food or milk.    -- Indication: For Sarcoidosis    docusate sodium 100 mg oral capsule  -- 1 cap(s) by mouth once a day  -- Indication: For Constipation    senna oral tablet  -- 2 tab(s) by mouth once a day (at bedtime)  -- Indication: For Constipation    pantoprazole 40 mg oral delayed release tablet  -- 1 tab(s) by mouth once a day (before a meal)  -- Indication: For GERD

## 2017-10-21 NOTE — PROGRESS NOTE ADULT - ASSESSMENT
Pt is a 48 yo F with PMH of sarcoidosis (on prednisone taper), nephrolithiasis, and DM (A1C 9/27 of 6.9) presenting here with 2-3 days for ENGLISH.

## 2017-10-21 NOTE — CONSULT NOTE ADULT - ASSESSMENT
48 y/o F with PMH of sarcoidosis p/w dyspnes    1.  Dyspnea-  -Etiology: possible related to underlying sarcoidosis +/- component of missed steroid dose +/- a component of anxiety  -Clinically stable, ok for discharge  -Send home on prednisone 30mg and taper to 20mg on Monday  -Follow up pulmonary as outpatient    Philippe Bess DO  Pulmonary and Critical Care Fellow

## 2017-10-21 NOTE — DISCHARGE NOTE ADULT - HOSPITAL COURSE
Pt is a 48 yo F with PMH of sarcoidosis (on prednisone taper), nephrolithiasis, and DM (A1C 9/27 of 6.9) presenting here with 2-3 days for ENGLISH. Pt was recently admitted to [facility] from 9/26-10/3 for SOB, found to have hypercalcemia (2/2 Sarcoidosis), and MIKALA (2/2 NSAID use). She was discharged with 50 mg prednisone taper and told to f/u with cardiology, pulmonology (Dr. Jacinto Adams), nephrology (Dr. Jose M Garibay), and rheumatology (Dr. Ospina). 2-3 days prior to admission she began having dyspnea, mostly after walking or talking. She reports orthopnea, and requires 3-4 pillows under her head to sleep at night. Pt complained to consistently worsening ENGLISH which caused her to have trouble completing her ADLs. One day prior to presentation, she went to her Rheumatologist, who recommended ED visit because of hypoxia and tachycardia. She also complains of an intermittent non-productive cough, chest tightness, palpitations, suprapubic tenderness, b/l lower leg pain, chills, headaches, increased eye discharge. She denies night sweats, n/v/d, hematochezia, skin rashes, epistaxis. She denies any sick contacts, recent travel, or pets at home.   In the ED, pt was found to be tachycardic,( ) hyperkalemia (K=5.7). Due to pts tachycardia and SOB, a VQ scan was performed to r/o PE, which was negative. Pt was give Kayexalate for her hyperkalemia with improvement to 5.4. Pt was then admitted to medicine.    Once admitted, the pt was seen by Rheumatology, who recommended a one time dose of Solumedrol 60 mg, since the pt had not taken her steroids on the day of admission. The pt was then continued on Solumedrol 30 mg BID, as per Rheumatology recommendations. The pts SOB began to improve, and was satting at 100% when ambulating. The pt was seen by Pulmonology, and it was discovered that there was a component on anxiety that was contributing to her SOB. Pulm spoke to the pt at length regarding breathing techniques and the need to follow up with her PCP regarding anxiolytics. The pt continued to improve clinically and was discharged to home in stable condition, with instructions to follow up with her PCP, Pulm, and Rufina, within 1 week of discharge.

## 2017-10-21 NOTE — PROGRESS NOTE ADULT - PROBLEM SELECTOR PLAN 1
- Pt with 2-3 days progressive dyspnea on exertion  - likely 2/2 to sarcoidosis  - pt complaining of ENGLISH, orthopnea, and chest tightness  - given that pt was on high dose steroid taper, with no antibiotic prophylaxis, there is concern for possible infectious etiology  - Infectious etiology however is unlikely given normal WBC count, no fever, and negative X-ray  - VQ scan negative for PE  - CE negative x3

## 2017-10-21 NOTE — DISCHARGE NOTE ADULT - CARE PROVIDER_API CALL
Anais Gordon), Internal Medicine; Pulmonary Disease  [address] [place] [address]  Phone: [phone]  Fax: [phone]    Sp Farris  [facility] Internal Medicine   [address] [place] [address] [address]  [place] [address]  Phone: [phone]  Fax: (   )    -    Frances Diaz), Internal Medicine; Rheumatology  [address] [facility] [address]  [place] [address]  Phone: [phone]  Fax: [phone]

## 2017-10-21 NOTE — PROGRESS NOTE ADULT - PROBLEM SELECTOR PLAN 4
- Pt with K of 5.7, and Mg of 1.3 on admission  - now s/p 30mg Kayexalate   - K today improving, will give 30 mg Kayexalate   - will continue to monitor

## 2017-10-21 NOTE — DISCHARGE NOTE ADULT - PATIENT PORTAL LINK FT
“You can access the [facility] Patient Portal, offered by [facility], by registering with the following website: [email or web address]”

## 2017-10-21 NOTE — DISCHARGE NOTE ADULT - CARE PROVIDERS DIRECT ADDRESSES
[email or web address]

## 2017-10-21 NOTE — DISCHARGE NOTE ADULT - CARE PLAN
Principal Discharge DX:	Shortness of breath  Goal:	Improvement  Instructions for follow-up, activity and diet:	You originally came into the hospital due to shortness of breath. You were tested for both infectious causes and clots in your lungs with no evidence of either. It is thought that your SOB was caused by anxiety and missing some doses of your steroids. Please follow up with your PCP within 1 week of discharge regarding need for anxiolytics. Follow up with both your Pulmonologist and  Secondary Diagnosis:	Sarcoidosis  Secondary Diagnosis:	Type 2 diabetes mellitus without complication, without long-term current use of insulin Principal Discharge DX:	Shortness of breath  Goal:	Improvement  Instructions for follow-up, activity and diet:	You originally came into the hospital due to shortness of breath. You were tested for both infectious causes and clots in your lungs with no evidence of either. It is thought that your SOB was caused by anxiety and missing some doses of your steroids. Please follow up with your PCP within 1 week of discharge regarding need for anxiolytics. Follow up with both your Pulmonologist and Rheumatologist within 1 week of discharge for further management of your Sarcoidosis.  Secondary Diagnosis:	Sarcoidosis  Goal:	Follow up  Instructions for follow-up, activity and diet:	You were followed by Rheumatology for your diagnosis of Sarcoidosis, and recommended you take 60mg of Prednisone daily. Continue to take your Prednisone as prescribed and follow up with your Rheumatologist within one week of discharge.  Secondary Diagnosis:	Type 2 diabetes mellitus without complication, without long-term current use of insulin  Goal:	Blood sugar control  Instructions for follow-up, activity and diet:	You were given insulin while you were admitted in order to control your blood sugar. You are on high doses of steroids, which will cause your blood sugar to rise. Please follow up with your PCP regarding starting medications to control your blood sugar while you are on steroids.

## 2017-10-21 NOTE — DISCHARGE NOTE ADULT - MEDICATION SUMMARY - MEDICATIONS TO CHANGE
I will SWITCH the dose or number of times a day I take the medications listed below when I get home from the hospital:    predniSONE 50 mg oral tablet  -- 1 tab(s) by mouth once a day   -- It is very important that you take or use this exactly as directed.  Do not skip doses or discontinue unless directed by your doctor.  Obtain medical advice before taking any non-prescription drugs as some may affect the action of this medication.  Take with food or milk.

## 2017-10-21 NOTE — PROGRESS NOTE ADULT - PROBLEM SELECTOR PLAN 2
- Pt with recent high dose prednisone taper, currently on 10 mg TID  - Per Rheum, will continue Solumedrol 30mg BID  - will c/s Pulmonology

## 2017-10-30 DIAGNOSIS — R69 ILLNESS, UNSPECIFIED: ICD-10-CM

## 2017-10-31 ENCOUNTER — APPOINTMENT (OUTPATIENT)
Dept: PULMONOLOGY | Facility: CLINIC | Age: 47
End: 2017-10-31

## 2017-11-02 ENCOUNTER — APPOINTMENT (OUTPATIENT)
Dept: PULMONOLOGY | Facility: CLINIC | Age: 47
End: 2017-11-02

## 2017-11-08 ENCOUNTER — APPOINTMENT (OUTPATIENT)
Dept: INTERNAL MEDICINE | Facility: CLINIC | Age: 47
End: 2017-11-08

## 2017-11-22 ENCOUNTER — APPOINTMENT (OUTPATIENT)
Dept: PULMONOLOGY | Facility: CLINIC | Age: 47
End: 2017-11-22

## 2017-11-24 ENCOUNTER — LABORATORY RESULT (OUTPATIENT)
Age: 47
End: 2017-11-24

## 2017-11-24 ENCOUNTER — OUTPATIENT (OUTPATIENT)
Dept: OUTPATIENT SERVICES | Facility: HOSPITAL | Age: 47
LOS: 1 days | End: 2017-11-24
Payer: MEDICAID

## 2017-11-24 ENCOUNTER — APPOINTMENT (OUTPATIENT)
Dept: INTERNAL MEDICINE | Facility: CLINIC | Age: 47
End: 2017-11-24
Payer: MEDICAID

## 2017-11-24 ENCOUNTER — MOBILE ON CALL (OUTPATIENT)
Age: 47
End: 2017-11-24

## 2017-11-24 VITALS
OXYGEN SATURATION: 96 % | WEIGHT: 120 LBS | HEIGHT: 61 IN | DIASTOLIC BLOOD PRESSURE: 80 MMHG | SYSTOLIC BLOOD PRESSURE: 148 MMHG | BODY MASS INDEX: 22.66 KG/M2 | HEART RATE: 106 BPM

## 2017-11-24 DIAGNOSIS — I10 ESSENTIAL (PRIMARY) HYPERTENSION: ICD-10-CM

## 2017-11-24 DIAGNOSIS — Z83.3 FAMILY HISTORY OF DIABETES MELLITUS: ICD-10-CM

## 2017-11-24 DIAGNOSIS — Z83.1 FAMILY HISTORY OF OTHER INFECTIOUS AND PARASITIC DISEASES: ICD-10-CM

## 2017-11-24 LAB
GLUCOSE BLDC GLUCOMTR-MCNC: 572
HBA1C MFR BLD HPLC: 10.5

## 2017-11-24 PROCEDURE — G0463: CPT

## 2017-11-24 PROCEDURE — 83036 HEMOGLOBIN GLYCOSYLATED A1C: CPT

## 2017-11-24 PROCEDURE — 82962 GLUCOSE BLOOD TEST: CPT

## 2017-11-24 PROCEDURE — 99205 OFFICE O/P NEW HI 60 MIN: CPT | Mod: GC

## 2017-11-28 LAB
CHOLEST SERPL-MCNC: 343 MG/DL
CHOLEST/HDLC SERPL: 4.3 RATIO
HBA1C MFR BLD HPLC: 11.3 %
HDLC SERPL-MCNC: 80 MG/DL
HIV1+2 AB SPEC QL IA.RAPID: NONREACTIVE
LDLC SERPL CALC-MCNC: 173 MG/DL
T PALLIDUM AB SER QL IA: NEGATIVE
TRIGL SERPL-MCNC: 450 MG/DL

## 2017-11-29 ENCOUNTER — APPOINTMENT (OUTPATIENT)
Dept: NEPHROLOGY | Facility: HOSPITAL | Age: 47
End: 2017-11-29

## 2017-11-29 ENCOUNTER — APPOINTMENT (OUTPATIENT)
Dept: INTERNAL MEDICINE | Facility: CLINIC | Age: 47
End: 2017-11-29

## 2017-12-01 ENCOUNTER — APPOINTMENT (OUTPATIENT)
Dept: INTERNAL MEDICINE | Facility: CLINIC | Age: 47
End: 2017-12-01

## 2017-12-05 DIAGNOSIS — D64.9 ANEMIA, UNSPECIFIED: ICD-10-CM

## 2017-12-05 DIAGNOSIS — Z83.3 FAMILY HISTORY OF DIABETES MELLITUS: ICD-10-CM

## 2017-12-05 DIAGNOSIS — Z83.1 FAMILY HISTORY OF OTHER INFECTIOUS AND PARASITIC DISEASES: ICD-10-CM

## 2017-12-05 DIAGNOSIS — D86.9 SARCOIDOSIS, UNSPECIFIED: ICD-10-CM

## 2017-12-05 DIAGNOSIS — E11.9 TYPE 2 DIABETES MELLITUS WITHOUT COMPLICATIONS: ICD-10-CM

## 2017-12-05 DIAGNOSIS — N18.9 CHRONIC KIDNEY DISEASE, UNSPECIFIED: ICD-10-CM

## 2017-12-13 NOTE — PROGRESS NOTE ADULT - PROBLEM SELECTOR PLAN 6
ER Documentation


Chief Complaint


Chief Complaint


diarrhea, vomiting x 1 day





HPI


5-year-old male presents here to emergency department for complaints of 

vomiting and diarrhea that started today.  Patient does not have any blood in 

the stool or black stool.  Patient denies any blood in vomit.  Patient does not 

have any sick contacts.  Patient does not complain of abdominal pain.  Patient 

did not take any medications to help with symptoms.  Patient denies any sick 

contacts.  Patient denies any recent travel.





ROS


All systems reviewed and are negative except as per history of present illness.





Medications


Home Meds


Active Scripts


Electrolyte,Oral (Pedialyte) 1,000 Ml Solution, 100 ML PO Q6, #1 BOT


   Prov:BERRY DEVINE NP         12/13/17


Ibuprofen (Ibuprofen) 100 Mg/5 Ml Oral.susp, 10 ML PO Q6H Y for PAIN AND OR 

ELEVATED TEMP, #4 OZ


   Prov:BERRY DEVINE NP         12/13/17


Ondansetron Hcl* (Ondansetron Hcl* Liq) 4 Mg/5 Ml Solution, 1 ML PO Q6H Y for 

NAUSEA AND/OR VOMITING, #2 OZ


   Prov:BERRY DEVINE NP         12/13/17


Electrolyte,Oral (Pedialyte) 1,000 Ml Solution, 100 ML PO Q6 Y for VOMITTING 

for 4 Days, ML


   Prov:GLADYS MARTIN MD         2/9/17


Ondansetron Hcl* (Zofran*) 4 Mg Tablet, 4 MG PO Q6H for NAUSEA AND/OR VOMITING, 

#8 TAB


   Prov:GLADYS MARTIN MD         2/9/17


Cetirizine Hcl* (Cetirizine Hcl*) 5 Mg/5 Ml Solution, 5 ML PO DAILY, #4 OZ


   Prov:BERRY DEVINE NP         12/19/16


Ibuprofen (Ibuprofen) 100 Mg/5 Ml Oral.susp, 10 ML PO Q6H Y for PAIN AND OR 

ELEVATED TEMP, #4 OZ


   Prov:BERRY DEVINE NP         12/19/16


Amoxicillin* (Amoxicillin* Susp) 400 Mg/5 Ml Susp.recon, 5 ML PO TID for 10 Days

, BOTTLE


   Prov:BERRY DEVINE NP         12/19/16


Cetirizine Hcl* (Zyrtec*) 1 Mg/Ml Syrup, 2.5 ML PO DAILY, #14 OZ


   Prov:HUGO REHMAN         1/30/16


Reported Medications


Ibuprofen* Susp (Motrin* Susp) 20 Mg/Ml Susp, 50 MG PO Q6H Y for FEVER GREATER 

THAN 100.6, ML


   1/30/16





Allergies


Allergies:  


Coded Allergies:  


     No Known Allergy (Unverified , 12/25/14)





PMhx/Soc


Immunizations: Up to date


Medical and Surgical Hx:  pt denies Medical Hx, pt denies Surgical Hx


History of Surgery:  No


Anesthesia Reaction:  No


Hx Neurological Disorder:  No


Hx Respiratory Disorders:  No


Hx Cardiac Disorders:  No


Hx Psychiatric Problems:  No


Hx Miscellaneous Medical Probl:  No


Hx Alcohol Use:  No


Hx Substance Use:  No


Hx Tobacco Use:  No


Smoking Status:  Never smoker





FmHx


Family History:  No coronary disease, No diabetes, No other





Physical Exam


Vitals





Vital Signs








  Date Time  Temp Pulse Resp B/P Pulse Ox O2 Delivery O2 Flow Rate FiO2


 


12/13/17 20:20 97.7 99 20 101/70 100   








Physical Exam


GENERAL:  The child is well developed and nourished for age, interactive and 

vigorous appearing. No acute distress and nontoxic.


HEENT: Atraumatic. Ears: Normal tympanic membrane, no erythema or bulging. No 

ear canal swelling. No ear discharge. Nose: normal nasal turbinates, no 

erythema or swelling. Normal nasal discharge. Throat: oropharynx clear. No 

tonsillar swelling or tonsillar exudates. No lymphadenopathy.


LUNGS:  Clear to auscultation.  No accessory muscle use.  No wheezing, no 

crackles. No signs or symptoms of respiratory distress.  


HEART:  Regular rate and rhythm. No murmurs, clicks, rubs or gallops.


ABDOMEN:  Soft, nontender and nondistended. Bowel sounds hyperactive. No 

rebound or guarding. No gross peritoneal signs. No Preston or McBurney point 

tenderness. No gross masses.


BACK:  No midline tenderness, no costovertebral tenderness.


EXTREMITIES:  There is no peripheral cyanosis or edema. No focal pain or 

notable trauma. Full range of motion. Good capillary refill.


NEURO:  The patient moves all 4 extremities with 5/5 strength. Cranial nerves 

are grossly intact. Normal mental status for age. 


SKIN:  There is no apparent rash, petechiae, erythema or swelling. Good skin 

turgor.


Results 24 hrs





 Current Medications








 Medications


  (Trade)  Dose


 Ordered  Sig/Lien


 Route


 PRN Reason  Start Time


 Stop Time Status Last Admin


Dose Admin


 


 Ondansetron HCl


  (Zofran (Ped))  2 mg  ONCE  STAT


 PO


   12/13/17 21:51


 12/13/17 21:52 DC  


 





Patient was given Zofran here in the emergency department. After treatment, 

patient was able to tolerate po fluids here in the emergency department without 

any vomiting. There is no signs and symptoms of dehydration.





Procedures/MDM


Medical Decision Making: Patient symptoms was likely is consistent with viral 

gastroenteritis.  No symptoms of dehydration.  No active vomiting at this time.

  There is low suspicion for abdominal emergencies at this time. Patients 

abdominal exam is normal at this time.  Radiology exams not indicated at this 

time. There is low suspicion for appendicitis, cholecystitis, abdominal aortic 

aneurysms or peritonitis at this time.  There is low suspicion for sepsis. 

Patient appears well and is hemodynamically stable.





Disposition: Home. Condition: Stable


Prescription Zofran Pedialyte ibuprofen


Instructions: Patient is advised to take medications as prescribed. Patient is 

advised to rest, increase fluid intake and do brat diet for next 1-2 days and 

progress as tolerated. Patient is advised that if symptoms are worse, severe 

abdominal pain, uncontrolled vomiting, high fever, severe flank pain, worst 

signs and symptoms, to return to the emergency department immediately.  

Otherwise, patient can follow up with primary care doctor in 5-7 days. 








Disclaimer: Inadvertent spelling and grammatical errors are likely due to EHR/

dictation software use and do not reflect on the overall quality of patient 

care. Also, please note that the electronic time recorded on this note does not 

necessarily reflect the actual time of the patient encounter.





Departure


Diagnosis:  


 Primary Impression:  


 Viral gastroenteritis


Condition:  Stable


Patient Instructions:  Viral Gastroenteritis in Children











BERRY DEVINE NP Dec 13, 2017 22:10 A1C 1 month ago was 7.8; FS yesterday , no SSI administered   -FS TID   -Diabetic diet  -Hold metformin   -Sliding scale Unclear etiology but pt now has two cell lines affected concerning for primary marrow issue  - Hematology c/s: recommending hepatitis panel, uric acid, PTH-rp and CT A/P (HIV -, )

## 2017-12-14 ENCOUNTER — APPOINTMENT (OUTPATIENT)
Dept: RHEUMATOLOGY | Facility: CLINIC | Age: 47
End: 2017-12-14

## 2017-12-14 ENCOUNTER — APPOINTMENT (OUTPATIENT)
Dept: INTERNAL MEDICINE | Facility: CLINIC | Age: 47
End: 2017-12-14

## 2017-12-14 ENCOUNTER — OTHER (OUTPATIENT)
Age: 47
End: 2017-12-14

## 2017-12-15 LAB
ALBUMIN SERPL ELPH-MCNC: 4.5 G/DL
ALP BLD-CCNC: 61 U/L
ALT SERPL-CCNC: 14 U/L
ANION GAP SERPL CALC-SCNC: 17 MMOL/L
AST SERPL-CCNC: 9 U/L
BASOPHILS # BLD AUTO: 0.01 K/UL
BASOPHILS NFR BLD AUTO: 0.1 %
BILIRUB SERPL-MCNC: 0.4 MG/DL
BUN SERPL-MCNC: 57 MG/DL
CALCIUM SERPL-MCNC: 10.2 MG/DL
CHLORIDE SERPL-SCNC: 92 MMOL/L
CO2 SERPL-SCNC: 23 MMOL/L
CREAT SERPL-MCNC: 1.72 MG/DL
EOSINOPHIL # BLD AUTO: 0.12 K/UL
EOSINOPHIL NFR BLD AUTO: 1.8 %
GLUCOSE SERPL-MCNC: 585 MG/DL
HCT VFR BLD CALC: 32.9 %
HGB BLD-MCNC: 10.6 G/DL
IMM GRANULOCYTES NFR BLD AUTO: 2.5 %
LYMPHOCYTES # BLD AUTO: 0.83 K/UL
LYMPHOCYTES NFR BLD AUTO: 12.3 %
MAN DIFF?: NORMAL
MCHC RBC-ENTMCNC: 28.5 PG
MCHC RBC-ENTMCNC: 32.2 GM/DL
MCV RBC AUTO: 88.4 FL
MONOCYTES # BLD AUTO: 0.19 K/UL
MONOCYTES NFR BLD AUTO: 2.8 %
NEUTROPHILS # BLD AUTO: 5.45 K/UL
NEUTROPHILS NFR BLD AUTO: 80.5 %
PLATELET # BLD AUTO: 284 K/UL
POTASSIUM SERPL-SCNC: 5.3 MMOL/L
PROT SERPL-MCNC: 7.9 G/DL
RBC # BLD: 3.72 M/UL
RBC # FLD: 14.8 %
SODIUM SERPL-SCNC: 132 MMOL/L
WBC # FLD AUTO: 6.77 K/UL

## 2017-12-27 ENCOUNTER — APPOINTMENT (OUTPATIENT)
Dept: INTERNAL MEDICINE | Facility: CLINIC | Age: 47
End: 2017-12-27

## 2017-12-27 ENCOUNTER — OUTPATIENT (OUTPATIENT)
Dept: OUTPATIENT SERVICES | Facility: HOSPITAL | Age: 47
LOS: 1 days | End: 2017-12-27
Payer: MEDICAID

## 2017-12-27 DIAGNOSIS — I10 ESSENTIAL (PRIMARY) HYPERTENSION: ICD-10-CM

## 2017-12-27 DIAGNOSIS — N83.9 NONINFLAMMATORY DISORDER OF OVARY, FALLOPIAN TUBE AND BROAD LIGAMENT, UNSPECIFIED: ICD-10-CM

## 2017-12-27 PROCEDURE — G0009: CPT

## 2017-12-27 PROCEDURE — G0463: CPT

## 2017-12-27 PROCEDURE — 99214 OFFICE O/P EST MOD 30 MIN: CPT | Mod: GC

## 2017-12-27 PROCEDURE — 90670 PCV13 VACCINE IM: CPT

## 2017-12-27 RX ORDER — PREDNISONE 10 MG/1
10 TABLET ORAL DAILY
Qty: 30 | Refills: 0 | Status: COMPLETED | COMMUNITY
Start: 2017-10-06 | End: 2017-12-27

## 2017-12-28 VITALS
BODY MASS INDEX: 23.03 KG/M2 | DIASTOLIC BLOOD PRESSURE: 74 MMHG | SYSTOLIC BLOOD PRESSURE: 146 MMHG | HEART RATE: 102 BPM | RESPIRATION RATE: 12 BRPM | WEIGHT: 122 LBS | HEIGHT: 61 IN

## 2017-12-29 ENCOUNTER — APPOINTMENT (OUTPATIENT)
Dept: PULMONOLOGY | Facility: CLINIC | Age: 47
End: 2017-12-29
Payer: MEDICAID

## 2017-12-29 VITALS
SYSTOLIC BLOOD PRESSURE: 110 MMHG | RESPIRATION RATE: 15 BRPM | BODY MASS INDEX: 24.73 KG/M2 | TEMPERATURE: 98.7 F | HEIGHT: 61 IN | OXYGEN SATURATION: 98 % | WEIGHT: 131 LBS | DIASTOLIC BLOOD PRESSURE: 62 MMHG | HEART RATE: 107 BPM

## 2017-12-29 PROCEDURE — 94729 DIFFUSING CAPACITY: CPT

## 2017-12-29 PROCEDURE — 99215 OFFICE O/P EST HI 40 MIN: CPT | Mod: 25

## 2017-12-29 PROCEDURE — ZZZZZ: CPT

## 2017-12-29 PROCEDURE — 94726 PLETHYSMOGRAPHY LUNG VOLUMES: CPT

## 2017-12-29 PROCEDURE — 94010 BREATHING CAPACITY TEST: CPT

## 2018-01-02 ENCOUNTER — APPOINTMENT (OUTPATIENT)
Dept: OBGYN | Facility: CLINIC | Age: 48
End: 2018-01-02

## 2018-01-03 DIAGNOSIS — N83.9 NONINFLAMMATORY DISORDER OF OVARY, FALLOPIAN TUBE AND BROAD LIGAMENT, UNSPECIFIED: ICD-10-CM

## 2018-01-03 DIAGNOSIS — E11.9 TYPE 2 DIABETES MELLITUS WITHOUT COMPLICATIONS: ICD-10-CM

## 2018-01-03 DIAGNOSIS — D86.9 SARCOIDOSIS, UNSPECIFIED: ICD-10-CM

## 2018-01-03 DIAGNOSIS — G40.909 EPILEPSY, UNSPECIFIED, NOT INTRACTABLE, WITHOUT STATUS EPILEPTICUS: ICD-10-CM

## 2018-01-03 DIAGNOSIS — Z23 ENCOUNTER FOR IMMUNIZATION: ICD-10-CM

## 2018-01-10 ENCOUNTER — APPOINTMENT (OUTPATIENT)
Dept: MRI IMAGING | Facility: HOSPITAL | Age: 48
End: 2018-01-10

## 2018-01-11 ENCOUNTER — APPOINTMENT (OUTPATIENT)
Dept: INTERNAL MEDICINE | Facility: HOME HEALTH | Age: 48
End: 2018-01-11

## 2018-01-12 ENCOUNTER — APPOINTMENT (OUTPATIENT)
Dept: ENDOCRINOLOGY | Facility: CLINIC | Age: 48
End: 2018-01-12
Payer: MEDICAID

## 2018-01-12 VITALS
HEART RATE: 88 BPM | BODY MASS INDEX: 25.3 KG/M2 | SYSTOLIC BLOOD PRESSURE: 120 MMHG | OXYGEN SATURATION: 98 % | DIASTOLIC BLOOD PRESSURE: 80 MMHG | WEIGHT: 134 LBS | HEIGHT: 61 IN

## 2018-01-12 DIAGNOSIS — E83.52 HYPERCALCEMIA: ICD-10-CM

## 2018-01-12 LAB — GLUCOSE BLDC GLUCOMTR-MCNC: 168

## 2018-01-12 PROCEDURE — 82962 GLUCOSE BLOOD TEST: CPT

## 2018-01-12 PROCEDURE — 36415 COLL VENOUS BLD VENIPUNCTURE: CPT

## 2018-01-12 PROCEDURE — 99205 OFFICE O/P NEW HI 60 MIN: CPT | Mod: 25

## 2018-01-12 RX ORDER — PREDNISONE 10 MG/1
10 TABLET ORAL
Qty: 120 | Refills: 3 | Status: DISCONTINUED | COMMUNITY
Start: 2017-12-29 | End: 2018-01-12

## 2018-01-12 RX ORDER — INSULIN GLARGINE 100 [IU]/ML
100 INJECTION, SOLUTION SUBCUTANEOUS AT BEDTIME
Qty: 5 | Refills: 0 | Status: DISCONTINUED | COMMUNITY
Start: 2017-12-27 | End: 2018-01-12

## 2018-01-14 ENCOUNTER — TRANSCRIPTION ENCOUNTER (OUTPATIENT)
Age: 48
End: 2018-01-14

## 2018-01-14 PROBLEM — E83.52 HYPERCALCEMIA: Status: ACTIVE | Noted: 2018-01-14

## 2018-01-17 ENCOUNTER — APPOINTMENT (OUTPATIENT)
Dept: MRI IMAGING | Facility: HOSPITAL | Age: 48
End: 2018-01-17

## 2018-01-18 ENCOUNTER — OTHER (OUTPATIENT)
Age: 48
End: 2018-01-18

## 2018-01-19 ENCOUNTER — APPOINTMENT (OUTPATIENT)
Dept: PULMONOLOGY | Facility: CLINIC | Age: 48
End: 2018-01-19
Payer: MEDICAID

## 2018-01-19 ENCOUNTER — OTHER (OUTPATIENT)
Age: 48
End: 2018-01-19

## 2018-01-19 VITALS
SYSTOLIC BLOOD PRESSURE: 120 MMHG | TEMPERATURE: 97.2 F | DIASTOLIC BLOOD PRESSURE: 80 MMHG | RESPIRATION RATE: 16 BRPM | BODY MASS INDEX: 24.92 KG/M2 | OXYGEN SATURATION: 97 % | WEIGHT: 132 LBS | HEIGHT: 61 IN | HEART RATE: 100 BPM

## 2018-01-19 PROCEDURE — 99213 OFFICE O/P EST LOW 20 MIN: CPT

## 2018-01-23 ENCOUNTER — APPOINTMENT (OUTPATIENT)
Dept: NEUROLOGY | Facility: HOSPITAL | Age: 48
End: 2018-01-23

## 2018-01-23 ENCOUNTER — OUTPATIENT (OUTPATIENT)
Dept: OUTPATIENT SERVICES | Facility: HOSPITAL | Age: 48
LOS: 1 days | End: 2018-01-23
Payer: MEDICAID

## 2018-01-23 VITALS
HEART RATE: 88 BPM | DIASTOLIC BLOOD PRESSURE: 92 MMHG | SYSTOLIC BLOOD PRESSURE: 152 MMHG | WEIGHT: 132 LBS | RESPIRATION RATE: 14 BRPM | HEIGHT: 61 IN | BODY MASS INDEX: 24.92 KG/M2

## 2018-01-23 DIAGNOSIS — G40.909 EPILEPSY, UNSPECIFIED, NOT INTRACTABLE, WITHOUT STATUS EPILEPTICUS: ICD-10-CM

## 2018-01-23 DIAGNOSIS — R56.9 UNSPECIFIED CONVULSIONS: ICD-10-CM

## 2018-01-23 DIAGNOSIS — D86.9 SARCOIDOSIS, UNSPECIFIED: ICD-10-CM

## 2018-01-23 LAB — PHENYTOIN SERPL QL: 6.7 UG/ML

## 2018-01-23 PROCEDURE — G0463: CPT

## 2018-01-24 ENCOUNTER — LABORATORY RESULT (OUTPATIENT)
Age: 48
End: 2018-01-24

## 2018-01-24 ENCOUNTER — OUTPATIENT (OUTPATIENT)
Dept: OUTPATIENT SERVICES | Facility: HOSPITAL | Age: 48
LOS: 1 days | End: 2018-01-24

## 2018-01-24 ENCOUNTER — APPOINTMENT (OUTPATIENT)
Dept: NEPHROLOGY | Facility: HOSPITAL | Age: 48
End: 2018-01-24
Payer: MEDICAID

## 2018-01-24 VITALS
BODY MASS INDEX: 25.3 KG/M2 | HEART RATE: 84 BPM | SYSTOLIC BLOOD PRESSURE: 157 MMHG | DIASTOLIC BLOOD PRESSURE: 103 MMHG | WEIGHT: 134 LBS | HEIGHT: 61 IN

## 2018-01-24 LAB
CREAT ?TM UR-MCNC: 80.18 MG/DL — SIGNIFICANT CHANGE UP
PROT UR-MCNC: 31.3 MG/DL — SIGNIFICANT CHANGE UP

## 2018-01-24 PROCEDURE — 99214 OFFICE O/P EST MOD 30 MIN: CPT | Mod: GC

## 2018-01-25 ENCOUNTER — OTHER (OUTPATIENT)
Age: 48
End: 2018-01-25

## 2018-01-25 DIAGNOSIS — I10 ESSENTIAL (PRIMARY) HYPERTENSION: ICD-10-CM

## 2018-01-25 DIAGNOSIS — N18.9 CHRONIC KIDNEY DISEASE, UNSPECIFIED: ICD-10-CM

## 2018-01-25 DIAGNOSIS — N17.9 ACUTE KIDNEY FAILURE, UNSPECIFIED: ICD-10-CM

## 2018-01-25 LAB
25(OH)D3 SERPL-MCNC: 13.5 NG/ML
ALBUMIN SERPL ELPH-MCNC: 4.1 G/DL
ALP BLD-CCNC: 41 U/L
ALT SERPL-CCNC: 8 U/L
ANION GAP SERPL CALC-SCNC: 16 MMOL/L
AST SERPL-CCNC: 9 U/L
BILIRUB SERPL-MCNC: <0.2 MG/DL
BUN SERPL-MCNC: 34 MG/DL
CALCIUM SERPL-MCNC: 9.2 MG/DL
CHLORIDE SERPL-SCNC: 101 MMOL/L
CHOLEST SERPL-MCNC: 289 MG/DL
CHOLEST/HDLC SERPL: 5.6 RATIO
CO2 SERPL-SCNC: 23 MMOL/L
CREAT SERPL-MCNC: 1.57 MG/DL
CREAT SPEC-SCNC: 86 MG/DL
GLUCOSE SERPL-MCNC: 212 MG/DL
HDLC SERPL-MCNC: 52 MG/DL
LDLC SERPL CALC-MCNC: NORMAL
MICROALBUMIN 24H UR DL<=1MG/L-MCNC: 6.6 MG/DL
MICROALBUMIN/CREAT 24H UR-RTO: 77 MG/G
POTASSIUM SERPL-SCNC: 4.9 MMOL/L
PROT SERPL-MCNC: 7.2 G/DL
SODIUM SERPL-SCNC: 140 MMOL/L
TRIGL SERPL-MCNC: 429 MG/DL
TSH SERPL-ACNC: 2.21 UIU/ML

## 2018-01-29 ENCOUNTER — APPOINTMENT (OUTPATIENT)
Dept: INTERNAL MEDICINE | Facility: CLINIC | Age: 48
End: 2018-01-29
Payer: MEDICAID

## 2018-01-29 ENCOUNTER — OUTPATIENT (OUTPATIENT)
Dept: OUTPATIENT SERVICES | Facility: HOSPITAL | Age: 48
LOS: 1 days | End: 2018-01-29
Payer: MEDICAID

## 2018-01-29 VITALS
HEART RATE: 83 BPM | WEIGHT: 133 LBS | BODY MASS INDEX: 25.13 KG/M2 | SYSTOLIC BLOOD PRESSURE: 142 MMHG | DIASTOLIC BLOOD PRESSURE: 90 MMHG | OXYGEN SATURATION: 97 %

## 2018-01-29 DIAGNOSIS — I10 ESSENTIAL (PRIMARY) HYPERTENSION: ICD-10-CM

## 2018-01-29 PROCEDURE — 99213 OFFICE O/P EST LOW 20 MIN: CPT | Mod: GE

## 2018-01-29 PROCEDURE — G0463: CPT

## 2018-01-29 PROCEDURE — 90471 IMMUNIZATION ADMIN: CPT

## 2018-01-29 PROCEDURE — 90715 TDAP VACCINE 7 YRS/> IM: CPT

## 2018-01-29 PROCEDURE — 83036 HEMOGLOBIN GLYCOSYLATED A1C: CPT

## 2018-01-29 RX ORDER — PANTOPRAZOLE 40 MG/1
40 TABLET, DELAYED RELEASE ORAL
Refills: 0 | Status: COMPLETED | COMMUNITY
Start: 2017-12-27 | End: 2018-01-29

## 2018-01-31 LAB — HBA1C MFR BLD HPLC: 8.4

## 2018-02-06 ENCOUNTER — RECORD ABSTRACTING (OUTPATIENT)
Age: 48
End: 2018-02-06

## 2018-02-08 ENCOUNTER — OUTPATIENT (OUTPATIENT)
Dept: OUTPATIENT SERVICES | Facility: HOSPITAL | Age: 48
LOS: 1 days | End: 2018-02-08
Payer: MEDICAID

## 2018-02-08 ENCOUNTER — APPOINTMENT (OUTPATIENT)
Dept: INTERNAL MEDICINE | Facility: CLINIC | Age: 48
End: 2018-02-08
Payer: MEDICAID

## 2018-02-08 VITALS
HEART RATE: 106 BPM | TEMPERATURE: 99.8 F | OXYGEN SATURATION: 98 % | SYSTOLIC BLOOD PRESSURE: 119 MMHG | WEIGHT: 130 LBS | DIASTOLIC BLOOD PRESSURE: 90 MMHG | BODY MASS INDEX: 24.56 KG/M2

## 2018-02-08 DIAGNOSIS — E78.5 HYPERLIPIDEMIA, UNSPECIFIED: ICD-10-CM

## 2018-02-08 DIAGNOSIS — I10 ESSENTIAL (PRIMARY) HYPERTENSION: ICD-10-CM

## 2018-02-08 DIAGNOSIS — D86.9 SARCOIDOSIS, UNSPECIFIED: ICD-10-CM

## 2018-02-08 DIAGNOSIS — E11.9 TYPE 2 DIABETES MELLITUS WITHOUT COMPLICATIONS: ICD-10-CM

## 2018-02-08 DIAGNOSIS — Z23 ENCOUNTER FOR IMMUNIZATION: ICD-10-CM

## 2018-02-08 DIAGNOSIS — K21.9 GASTRO-ESOPHAGEAL REFLUX DISEASE WITHOUT ESOPHAGITIS: ICD-10-CM

## 2018-02-08 DIAGNOSIS — F32.89 OTHER SPECIFIED DEPRESSIVE EPISODES: ICD-10-CM

## 2018-02-08 DIAGNOSIS — G40.909 EPILEPSY, UNSPECIFIED, NOT INTRACTABLE, WITHOUT STATUS EPILEPTICUS: ICD-10-CM

## 2018-02-08 DIAGNOSIS — N18.9 CHRONIC KIDNEY DISEASE, UNSPECIFIED: ICD-10-CM

## 2018-02-08 PROCEDURE — 99213 OFFICE O/P EST LOW 20 MIN: CPT | Mod: GE

## 2018-02-08 PROCEDURE — G0463: CPT

## 2018-02-12 ENCOUNTER — APPOINTMENT (OUTPATIENT)
Dept: ENDOCRINOLOGY | Facility: CLINIC | Age: 48
End: 2018-02-12

## 2018-02-22 ENCOUNTER — APPOINTMENT (OUTPATIENT)
Dept: PULMONOLOGY | Facility: CLINIC | Age: 48
End: 2018-02-22

## 2018-02-22 DIAGNOSIS — R05 COUGH: ICD-10-CM

## 2018-02-22 DIAGNOSIS — F32.89 OTHER SPECIFIED DEPRESSIVE EPISODES: ICD-10-CM

## 2018-03-01 PROCEDURE — G9001: CPT

## 2018-03-05 ENCOUNTER — APPOINTMENT (OUTPATIENT)
Dept: INTERNAL MEDICINE | Facility: CLINIC | Age: 48
End: 2018-03-05
Payer: MEDICAID

## 2018-03-05 ENCOUNTER — OUTPATIENT (OUTPATIENT)
Dept: OUTPATIENT SERVICES | Facility: HOSPITAL | Age: 48
LOS: 1 days | End: 2018-03-05
Payer: MEDICAID

## 2018-03-05 VITALS
HEIGHT: 61 IN | DIASTOLIC BLOOD PRESSURE: 90 MMHG | WEIGHT: 132 LBS | HEART RATE: 93 BPM | BODY MASS INDEX: 24.92 KG/M2 | SYSTOLIC BLOOD PRESSURE: 160 MMHG | OXYGEN SATURATION: 98 %

## 2018-03-05 DIAGNOSIS — F41.9 ANXIETY DISORDER, UNSPECIFIED: ICD-10-CM

## 2018-03-05 DIAGNOSIS — I10 ESSENTIAL (PRIMARY) HYPERTENSION: ICD-10-CM

## 2018-03-05 DIAGNOSIS — K21.9 GASTRO-ESOPHAGEAL REFLUX DISEASE WITHOUT ESOPHAGITIS: ICD-10-CM

## 2018-03-05 DIAGNOSIS — D86.9 SARCOIDOSIS, UNSPECIFIED: ICD-10-CM

## 2018-03-05 PROCEDURE — 99214 OFFICE O/P EST MOD 30 MIN: CPT | Mod: GC

## 2018-03-05 RX ORDER — AZITHROMYCIN 500 MG/1
500 TABLET, FILM COATED ORAL DAILY
Qty: 5 | Refills: 0 | Status: COMPLETED | COMMUNITY
Start: 2018-02-08 | End: 2018-03-05

## 2018-03-05 RX ORDER — DEXTROMETHORPHAN 30 MG/5ML
30 SUSPENSION, EXTENDED RELEASE ORAL
Qty: 1 | Refills: 0 | Status: COMPLETED | COMMUNITY
Start: 2018-02-08 | End: 2018-03-05

## 2018-03-05 RX ORDER — OMEGA-3-ACID ETHYL ESTERS CAPSULES 1 G/1
1 CAPSULE, LIQUID FILLED ORAL
Qty: 120 | Refills: 3 | Status: COMPLETED | COMMUNITY
Start: 2018-01-25 | End: 2018-03-05

## 2018-03-05 RX ORDER — BENZONATATE 100 MG/1
100 CAPSULE ORAL
Qty: 42 | Refills: 0 | Status: COMPLETED | COMMUNITY
Start: 2018-02-06 | End: 2018-03-05

## 2018-03-05 RX ORDER — INSULIN GLARGINE 100 [IU]/ML
100 INJECTION, SOLUTION SUBCUTANEOUS
Qty: 30 | Refills: 0 | Status: COMPLETED | COMMUNITY
Start: 2018-01-12 | End: 2018-03-05

## 2018-03-05 RX ORDER — FAMOTIDINE 20 MG/1
20 TABLET, FILM COATED ORAL DAILY
Qty: 30 | Refills: 0 | Status: COMPLETED | COMMUNITY
Start: 2018-01-29 | End: 2018-03-05

## 2018-03-07 ENCOUNTER — FORM ENCOUNTER (OUTPATIENT)
Age: 48
End: 2018-03-07

## 2018-03-08 ENCOUNTER — APPOINTMENT (OUTPATIENT)
Dept: MRI IMAGING | Facility: HOSPITAL | Age: 48
End: 2018-03-08

## 2018-03-08 DIAGNOSIS — N83.9 NONINFLAMMATORY DISORDER OF OVARY, FALLOPIAN TUBE AND BROAD LIGAMENT, UNSPECIFIED: ICD-10-CM

## 2018-03-08 DIAGNOSIS — G40.909 EPILEPSY, UNSPECIFIED, NOT INTRACTABLE, WITHOUT STATUS EPILEPTICUS: ICD-10-CM

## 2018-03-08 LAB
GLUCOSE BLDC GLUCOMTR-MCNC: 272 MG/DL — HIGH (ref 70–99)
GLUCOSE BLDC GLUCOMTR-MCNC: 309 MG/DL — HIGH (ref 70–99)
GLUCOSE BLDC GLUCOMTR-MCNC: 329 MG/DL — HIGH (ref 70–99)

## 2018-03-08 PROCEDURE — 82962 GLUCOSE BLOOD TEST: CPT

## 2018-03-08 PROCEDURE — 72195 MRI PELVIS W/O DYE: CPT

## 2018-03-08 PROCEDURE — G0463: CPT

## 2018-03-08 PROCEDURE — 70553 MRI BRAIN STEM W/O & W/DYE: CPT | Mod: 26

## 2018-03-08 PROCEDURE — 70553 MRI BRAIN STEM W/O & W/DYE: CPT

## 2018-03-08 PROCEDURE — 72195 MRI PELVIS W/O DYE: CPT | Mod: 26

## 2018-03-14 ENCOUNTER — APPOINTMENT (OUTPATIENT)
Dept: NEUROLOGY | Facility: CLINIC | Age: 48
End: 2018-03-14

## 2018-03-22 ENCOUNTER — APPOINTMENT (OUTPATIENT)
Dept: INTERNAL MEDICINE | Facility: CLINIC | Age: 48
End: 2018-03-22

## 2018-03-23 ENCOUNTER — APPOINTMENT (OUTPATIENT)
Dept: NEUROLOGY | Facility: CLINIC | Age: 48
End: 2018-03-23
Payer: MEDICAID

## 2018-03-23 PROCEDURE — 95819 EEG AWAKE AND ASLEEP: CPT

## 2018-03-24 PROCEDURE — 95953: CPT

## 2018-03-25 ENCOUNTER — OTHER (OUTPATIENT)
Age: 48
End: 2018-03-25

## 2018-03-27 ENCOUNTER — RESULT REVIEW (OUTPATIENT)
Age: 48
End: 2018-03-27

## 2018-03-28 ENCOUNTER — RX RENEWAL (OUTPATIENT)
Age: 48
End: 2018-03-28

## 2018-04-02 ENCOUNTER — OTHER (OUTPATIENT)
Age: 48
End: 2018-04-02

## 2018-04-05 ENCOUNTER — APPOINTMENT (OUTPATIENT)
Dept: PULMONOLOGY | Facility: CLINIC | Age: 48
End: 2018-04-05
Payer: MEDICAID

## 2018-04-05 VITALS
TEMPERATURE: 98.4 F | RESPIRATION RATE: 18 BRPM | HEART RATE: 104 BPM | WEIGHT: 133 LBS | HEIGHT: 61 IN | BODY MASS INDEX: 25.11 KG/M2 | OXYGEN SATURATION: 98 %

## 2018-04-05 PROCEDURE — ZZZZZ: CPT

## 2018-04-05 PROCEDURE — 94060 EVALUATION OF WHEEZING: CPT

## 2018-04-05 PROCEDURE — 94726 PLETHYSMOGRAPHY LUNG VOLUMES: CPT

## 2018-04-05 PROCEDURE — 99214 OFFICE O/P EST MOD 30 MIN: CPT | Mod: 25

## 2018-04-05 PROCEDURE — 94729 DIFFUSING CAPACITY: CPT

## 2018-04-10 ENCOUNTER — LABORATORY RESULT (OUTPATIENT)
Age: 48
End: 2018-04-10

## 2018-04-10 ENCOUNTER — APPOINTMENT (OUTPATIENT)
Dept: NEUROLOGY | Facility: HOSPITAL | Age: 48
End: 2018-04-10

## 2018-04-10 ENCOUNTER — OUTPATIENT (OUTPATIENT)
Dept: OUTPATIENT SERVICES | Facility: HOSPITAL | Age: 48
LOS: 1 days | End: 2018-04-10
Payer: MEDICAID

## 2018-04-10 VITALS
BODY MASS INDEX: 25.3 KG/M2 | DIASTOLIC BLOOD PRESSURE: 99 MMHG | SYSTOLIC BLOOD PRESSURE: 158 MMHG | WEIGHT: 134 LBS | HEART RATE: 87 BPM | HEIGHT: 61 IN

## 2018-04-10 DIAGNOSIS — D86.9 SARCOIDOSIS, UNSPECIFIED: ICD-10-CM

## 2018-04-10 DIAGNOSIS — G40.909 EPILEPSY, UNSPECIFIED, NOT INTRACTABLE, WITHOUT STATUS EPILEPTICUS: ICD-10-CM

## 2018-04-10 DIAGNOSIS — R51 HEADACHE: ICD-10-CM

## 2018-04-10 PROCEDURE — G0463: CPT

## 2018-04-10 RX ORDER — ERGOCALCIFEROL 1.25 MG/1
1.25 MG CAPSULE, LIQUID FILLED ORAL
Qty: 8 | Refills: 0 | Status: DISCONTINUED | COMMUNITY
Start: 2018-01-25 | End: 2018-04-10

## 2018-04-11 ENCOUNTER — MEDICATION RENEWAL (OUTPATIENT)
Age: 48
End: 2018-04-11

## 2018-04-12 ENCOUNTER — APPOINTMENT (OUTPATIENT)
Dept: ENDOCRINOLOGY | Facility: CLINIC | Age: 48
End: 2018-04-12
Payer: MEDICAID

## 2018-04-12 ENCOUNTER — APPOINTMENT (OUTPATIENT)
Dept: INTERNAL MEDICINE | Facility: CLINIC | Age: 48
End: 2018-04-12

## 2018-04-12 PROCEDURE — 83036 HEMOGLOBIN GLYCOSYLATED A1C: CPT | Mod: QW

## 2018-04-12 PROCEDURE — 99214 OFFICE O/P EST MOD 30 MIN: CPT | Mod: 25

## 2018-04-12 RX ORDER — PANTOPRAZOLE 40 MG/1
40 TABLET, DELAYED RELEASE ORAL DAILY
Qty: 30 | Refills: 1 | Status: DISCONTINUED | COMMUNITY
Start: 2018-03-05 | End: 2018-04-12

## 2018-04-25 ENCOUNTER — OUTPATIENT (OUTPATIENT)
Dept: OUTPATIENT SERVICES | Facility: HOSPITAL | Age: 48
LOS: 1 days | End: 2018-04-25

## 2018-04-25 ENCOUNTER — APPOINTMENT (OUTPATIENT)
Dept: NEPHROLOGY | Facility: HOSPITAL | Age: 48
End: 2018-04-25
Payer: MEDICAID

## 2018-04-25 VITALS
SYSTOLIC BLOOD PRESSURE: 120 MMHG | HEIGHT: 61 IN | HEART RATE: 95 BPM | DIASTOLIC BLOOD PRESSURE: 84 MMHG | BODY MASS INDEX: 25.68 KG/M2 | WEIGHT: 136 LBS

## 2018-04-25 PROCEDURE — 99213 OFFICE O/P EST LOW 20 MIN: CPT | Mod: GC

## 2018-04-26 DIAGNOSIS — N18.9 CHRONIC KIDNEY DISEASE, UNSPECIFIED: ICD-10-CM

## 2018-04-26 DIAGNOSIS — I10 ESSENTIAL (PRIMARY) HYPERTENSION: ICD-10-CM

## 2018-04-26 DIAGNOSIS — N17.9 ACUTE KIDNEY FAILURE, UNSPECIFIED: ICD-10-CM

## 2018-04-30 LAB
25(OH)D3 SERPL-MCNC: 28.1 NG/ML
ALBUMIN SERPL ELPH-MCNC: 4.3 G/DL
ALP BLD-CCNC: 72 U/L
ALT SERPL-CCNC: 16 U/L
ANION GAP SERPL CALC-SCNC: 17 MMOL/L
AST SERPL-CCNC: 11 U/L
BILIRUB SERPL-MCNC: 0.2 MG/DL
BUN SERPL-MCNC: 25 MG/DL
C PEPTIDE SERPL-MCNC: 3.7 NG/ML
CALCIUM SERPL-MCNC: 9.6 MG/DL
CHLORIDE SERPL-SCNC: 99 MMOL/L
CHOLEST SERPL-MCNC: 228 MG/DL
CHOLEST/HDLC SERPL: 3.7 RATIO
CK SERPL-CCNC: 19 U/L
CO2 SERPL-SCNC: 23 MMOL/L
CREAT SERPL-MCNC: 1.42 MG/DL
GLUCOSE SERPL-MCNC: 245 MG/DL
HBA1C MFR BLD HPLC: 10.2
HDLC SERPL-MCNC: 62 MG/DL
LDLC SERPL CALC-MCNC: 93 MG/DL
POTASSIUM SERPL-SCNC: 5 MMOL/L
PROT SERPL-MCNC: 7.6 G/DL
SODIUM SERPL-SCNC: 139 MMOL/L
TRIGL SERPL-MCNC: 364 MG/DL

## 2018-05-02 ENCOUNTER — APPOINTMENT (OUTPATIENT)
Dept: PULMONOLOGY | Facility: CLINIC | Age: 48
End: 2018-05-02

## 2018-05-14 ENCOUNTER — APPOINTMENT (OUTPATIENT)
Dept: INTERNAL MEDICINE | Facility: CLINIC | Age: 48
End: 2018-05-14

## 2018-05-23 ENCOUNTER — APPOINTMENT (OUTPATIENT)
Dept: ENDOCRINOLOGY | Facility: CLINIC | Age: 48
End: 2018-05-23

## 2018-06-13 ENCOUNTER — APPOINTMENT (OUTPATIENT)
Dept: PULMONOLOGY | Facility: CLINIC | Age: 48
End: 2018-06-13
Payer: MEDICAID

## 2018-06-13 VITALS
RESPIRATION RATE: 16 BRPM | SYSTOLIC BLOOD PRESSURE: 130 MMHG | WEIGHT: 140 LBS | TEMPERATURE: 98.1 F | HEART RATE: 98 BPM | BODY MASS INDEX: 26.43 KG/M2 | DIASTOLIC BLOOD PRESSURE: 90 MMHG | HEIGHT: 61 IN

## 2018-06-13 PROCEDURE — 99213 OFFICE O/P EST LOW 20 MIN: CPT | Mod: GC

## 2018-06-27 ENCOUNTER — APPOINTMENT (OUTPATIENT)
Dept: PULMONOLOGY | Facility: CLINIC | Age: 48
End: 2018-06-27

## 2018-06-27 ENCOUNTER — OUTPATIENT (OUTPATIENT)
Dept: OUTPATIENT SERVICES | Facility: HOSPITAL | Age: 48
LOS: 1 days | End: 2018-06-27
Payer: MEDICAID

## 2018-06-27 ENCOUNTER — APPOINTMENT (OUTPATIENT)
Dept: INTERNAL MEDICINE | Facility: CLINIC | Age: 48
End: 2018-06-27
Payer: MEDICAID

## 2018-06-27 VITALS
DIASTOLIC BLOOD PRESSURE: 80 MMHG | BODY MASS INDEX: 26.26 KG/M2 | SYSTOLIC BLOOD PRESSURE: 127 MMHG | HEART RATE: 88 BPM | OXYGEN SATURATION: 99 % | WEIGHT: 139 LBS

## 2018-06-27 DIAGNOSIS — I10 ESSENTIAL (PRIMARY) HYPERTENSION: ICD-10-CM

## 2018-06-27 LAB — HBA1C MFR BLD HPLC: 12.9

## 2018-06-27 PROCEDURE — G0463: CPT

## 2018-06-27 PROCEDURE — 99214 OFFICE O/P EST MOD 30 MIN: CPT | Mod: GC

## 2018-06-27 RX ORDER — ENALAPRIL MALEATE 5 MG/1
5 TABLET ORAL
Qty: 60 | Refills: 3 | Status: DISCONTINUED | COMMUNITY
Start: 2018-04-12 | End: 2018-06-27

## 2018-06-28 RX ORDER — ESCITALOPRAM OXALATE 10 MG/1
10 TABLET ORAL TWICE DAILY
Qty: 180 | Refills: 0 | Status: DISCONTINUED | COMMUNITY
Start: 2018-06-27 | End: 2018-06-28

## 2018-06-28 NOTE — END OF VISIT
[] : Resident [FreeTextEntry3] : emphasized dm mgmt; resent all meds; wrote letter for public assistance; connected to  and sw. fu with glucometer in 2w. call if any low sugars.

## 2018-06-28 NOTE — PLAN
[FreeTextEntry1] : 47 y.o F with DMII, Sarcoidosis, CKDIII, seizure disorder presents to the clinic for a follow up \par \par # pulm sarcoid\par - at June 13 visit, pt had prednisone dose decr'd from 20mg qd to 15mg qd. Feels about the same on this dose. Needs a few minutes of rest after walking up one flight of steps or 4 flat city blocks. \par ---> pt skipped a pft appointment today to see med team, advised to make pulm appt for next week, having had just dec'rd steroids on June 13th and given that pt still with sx, defer change in steroiid mgmt at this time, will follow up additional pulm rec's. \par \par # dry cough: Has been going for about 4 weeks per pt, worse at night, but not responsive to flonase given by pulm on June 13\par ---> may be 2/2 ACE cough, DC'd enalapril, started losartan, fu BP at next visit. \par \par # Anxiety \par - KAIT 7 score at 8 and moderate interference in her life, started lexapro 10mg and will bridge with xanax for now for breakthru anxiety\par \par # Disability\par - filled out paperwork related to disability application \par \par DW Dr. Mitchell\par FU 2 weeks for anxiety and BP check

## 2018-06-28 NOTE — REVIEW OF SYSTEMS
[Cough] : cough [Dyspnea on Exertion] : dyspnea on exertion [Anxiety] : anxiety [Negative] : Heme/Lymph [Fever] : no fever [Chills] : no chills [Chest Pain] : no chest pain [FreeTextEntry6] : see hpi

## 2018-06-28 NOTE — PHYSICAL EXAM

## 2018-06-28 NOTE — HISTORY OF PRESENT ILLNESS
[FreeTextEntry1] : Follow up [de-identified] : 47 y.o F with DMII, Sarcoidosis, CKDIII, seizure disorder presents to the clinic for a follow up \par \par # pulm sarcoid\par - at June 13 visit, pt had prednisone dose decr'd from 20mg qd to 15mg qd. Feels about the same on this dose. Needs a few minutes of rest after walking up one flight of steps or 4 flat city blocks. \par \par # dry cough: Has been going for about 4 weeks per pt, worse at night, but not responsive to flonase given by pulm on June 13\par \par Pt states she has home aid for 6 hours per day. Also relies on kids for support. Pt states she always needs someone nearby or else would have a panic attack. Reports last panic was Tuesday, occurred after walking through tunnel near the [place] [facility].

## 2018-07-02 DIAGNOSIS — D86.9 SARCOIDOSIS, UNSPECIFIED: ICD-10-CM

## 2018-07-02 DIAGNOSIS — E11.9 TYPE 2 DIABETES MELLITUS WITHOUT COMPLICATIONS: ICD-10-CM

## 2018-07-02 DIAGNOSIS — F41.9 ANXIETY DISORDER, UNSPECIFIED: ICD-10-CM

## 2018-07-02 DIAGNOSIS — G40.909 EPILEPSY, UNSPECIFIED, NOT INTRACTABLE, WITHOUT STATUS EPILEPTICUS: ICD-10-CM

## 2018-07-02 DIAGNOSIS — N18.9 CHRONIC KIDNEY DISEASE, UNSPECIFIED: ICD-10-CM

## 2018-07-03 ENCOUNTER — OUTPATIENT (OUTPATIENT)
Dept: OUTPATIENT SERVICES | Facility: HOSPITAL | Age: 48
LOS: 1 days | End: 2018-07-03
Payer: MEDICAID

## 2018-07-03 ENCOUNTER — APPOINTMENT (OUTPATIENT)
Dept: NEUROLOGY | Facility: HOSPITAL | Age: 48
End: 2018-07-03

## 2018-07-03 VITALS
SYSTOLIC BLOOD PRESSURE: 158 MMHG | HEART RATE: 83 BPM | BODY MASS INDEX: 26.24 KG/M2 | DIASTOLIC BLOOD PRESSURE: 94 MMHG | HEIGHT: 61 IN | WEIGHT: 139 LBS | RESPIRATION RATE: 14 BRPM

## 2018-07-03 DIAGNOSIS — R56.9 UNSPECIFIED CONVULSIONS: ICD-10-CM

## 2018-07-03 DIAGNOSIS — G40.909 EPILEPSY, UNSPECIFIED, NOT INTRACTABLE, WITHOUT STATUS EPILEPTICUS: ICD-10-CM

## 2018-07-03 PROCEDURE — G0463: CPT

## 2018-07-11 ENCOUNTER — APPOINTMENT (OUTPATIENT)
Dept: ENDOCRINOLOGY | Facility: CLINIC | Age: 48
End: 2018-07-11

## 2018-08-01 ENCOUNTER — APPOINTMENT (OUTPATIENT)
Dept: INTERNAL MEDICINE | Facility: CLINIC | Age: 48
End: 2018-08-01

## 2018-08-03 ENCOUNTER — OTHER (OUTPATIENT)
Age: 48
End: 2018-08-03

## 2018-08-03 ENCOUNTER — TRANSCRIPTION ENCOUNTER (OUTPATIENT)
Age: 48
End: 2018-08-03

## 2018-08-07 ENCOUNTER — RX RENEWAL (OUTPATIENT)
Age: 48
End: 2018-08-07

## 2018-08-09 ENCOUNTER — APPOINTMENT (OUTPATIENT)
Dept: INTERNAL MEDICINE | Facility: CLINIC | Age: 48
End: 2018-08-09

## 2018-08-30 ENCOUNTER — RX RENEWAL (OUTPATIENT)
Age: 48
End: 2018-08-30

## 2018-09-17 NOTE — H&P ADULT - NEGATIVE ENMT SYMPTOMS
no nose bleeds/no gum bleeding
Patient is a 71yo male reporting 2 days of fever to 101 at home. Patient reports he has hx of sepsis with unknown origin and this feels the same way. Patient denies any chest pain, shortness of breath, abdominal pain, n/v/d, urinary symptoms. Patient took 1000mg of Ciprofloxacin prior to arrival as per his PMD.

## 2018-09-24 ENCOUNTER — LABORATORY RESULT (OUTPATIENT)
Age: 48
End: 2018-09-24

## 2018-09-24 ENCOUNTER — OUTPATIENT (OUTPATIENT)
Dept: OUTPATIENT SERVICES | Facility: HOSPITAL | Age: 48
LOS: 1 days | End: 2018-09-24
Payer: MEDICAID

## 2018-09-24 ENCOUNTER — APPOINTMENT (OUTPATIENT)
Dept: INTERNAL MEDICINE | Facility: CLINIC | Age: 48
End: 2018-09-24
Payer: MEDICAID

## 2018-09-24 VITALS
HEART RATE: 88 BPM | OXYGEN SATURATION: 98 % | BODY MASS INDEX: 24 KG/M2 | WEIGHT: 127 LBS | DIASTOLIC BLOOD PRESSURE: 90 MMHG | SYSTOLIC BLOOD PRESSURE: 134 MMHG

## 2018-09-24 DIAGNOSIS — I10 ESSENTIAL (PRIMARY) HYPERTENSION: ICD-10-CM

## 2018-09-24 LAB
HCT VFR BLD CALC: 35.2 % — SIGNIFICANT CHANGE UP (ref 34.5–45)
HGB BLD-MCNC: 11.9 G/DL — SIGNIFICANT CHANGE UP (ref 11.5–15.5)
MCHC RBC-ENTMCNC: 27.6 PG — SIGNIFICANT CHANGE UP (ref 27–34)
MCHC RBC-ENTMCNC: 33.8 GM/DL — SIGNIFICANT CHANGE UP (ref 32–36)
MCV RBC AUTO: 81.7 FL — SIGNIFICANT CHANGE UP (ref 80–100)
PLATELET # BLD AUTO: 204 K/UL — SIGNIFICANT CHANGE UP (ref 150–400)
RBC # BLD: 4.31 M/UL — SIGNIFICANT CHANGE UP (ref 3.8–5.2)
RBC # FLD: 12.6 % — SIGNIFICANT CHANGE UP (ref 10.3–14.5)
WBC # BLD: 8.55 K/UL — SIGNIFICANT CHANGE UP (ref 3.8–10.5)
WBC # FLD AUTO: 8.55 K/UL — SIGNIFICANT CHANGE UP (ref 3.8–10.5)

## 2018-09-24 PROCEDURE — 80061 LIPID PANEL: CPT

## 2018-09-24 PROCEDURE — G0463: CPT

## 2018-09-24 PROCEDURE — 99214 OFFICE O/P EST MOD 30 MIN: CPT | Mod: GC

## 2018-09-24 PROCEDURE — 80185 ASSAY OF PHENYTOIN TOTAL: CPT

## 2018-09-24 PROCEDURE — 80053 COMPREHEN METABOLIC PANEL: CPT

## 2018-09-24 PROCEDURE — 85027 COMPLETE CBC AUTOMATED: CPT

## 2018-09-24 PROCEDURE — 83036 HEMOGLOBIN GLYCOSYLATED A1C: CPT

## 2018-09-24 NOTE — PHYSICAL EXAM
[No Acute Distress] : no acute distress [Well Nourished] : well nourished [Normal Sclera/Conjunctiva] : normal sclera/conjunctiva [PERRL] : pupils equal round and reactive to light [Normal Outer Ear/Nose] : the outer ears and nose were normal in appearance [No JVD] : no jugular venous distention [Supple] : supple [No Respiratory Distress] : no respiratory distress  [Clear to Auscultation] : lungs were clear to auscultation bilaterally [Normal Rate] : normal rate  [Regular Rhythm] : with a regular rhythm [Soft] : abdomen soft [Non Tender] : non-tender [No CVA Tenderness] : no CVA  tenderness [Grossly Normal Strength/Tone] : grossly normal strength/tone [No Rash] : no rash [Coordination Grossly Intact] : coordination grossly intact [Normal Insight/Judgement] : insight and judgment were intact

## 2018-09-25 ENCOUNTER — APPOINTMENT (OUTPATIENT)
Dept: NEUROLOGY | Facility: HOSPITAL | Age: 48
End: 2018-09-25

## 2018-09-25 ENCOUNTER — OUTPATIENT (OUTPATIENT)
Dept: OUTPATIENT SERVICES | Facility: HOSPITAL | Age: 48
LOS: 1 days | End: 2018-09-25
Payer: MEDICAID

## 2018-09-25 VITALS
HEIGHT: 61 IN | BODY MASS INDEX: 23.98 KG/M2 | WEIGHT: 127 LBS | HEART RATE: 92 BPM | DIASTOLIC BLOOD PRESSURE: 97 MMHG | SYSTOLIC BLOOD PRESSURE: 145 MMHG | RESPIRATION RATE: 16 BRPM

## 2018-09-25 DIAGNOSIS — G40.909 EPILEPSY, UNSPECIFIED, NOT INTRACTABLE, WITHOUT STATUS EPILEPTICUS: ICD-10-CM

## 2018-09-25 DIAGNOSIS — R56.9 UNSPECIFIED CONVULSIONS: ICD-10-CM

## 2018-09-25 LAB
ALBUMIN SERPL ELPH-MCNC: 4.5 G/DL — SIGNIFICANT CHANGE UP (ref 3.3–5)
ALP SERPL-CCNC: 63 U/L — SIGNIFICANT CHANGE UP (ref 40–120)
ALT FLD-CCNC: 24 U/L — SIGNIFICANT CHANGE UP (ref 10–45)
ANION GAP SERPL CALC-SCNC: 19 MMOL/L — HIGH (ref 5–17)
AST SERPL-CCNC: 22 U/L — SIGNIFICANT CHANGE UP (ref 10–40)
BILIRUB SERPL-MCNC: 0.2 MG/DL — SIGNIFICANT CHANGE UP (ref 0.2–1.2)
BUN SERPL-MCNC: 34 MG/DL — HIGH (ref 7–23)
CALCIUM SERPL-MCNC: 10.1 MG/DL — SIGNIFICANT CHANGE UP (ref 8.4–10.5)
CHLORIDE SERPL-SCNC: 91 MMOL/L — LOW (ref 96–108)
CHOLEST SERPL-MCNC: 336 MG/DL — HIGH (ref 10–199)
CO2 SERPL-SCNC: 23 MMOL/L — SIGNIFICANT CHANGE UP (ref 22–31)
CREAT SERPL-MCNC: 1.44 MG/DL — HIGH (ref 0.5–1.3)
GLUCOSE SERPL-MCNC: 564 MG/DL — CRITICAL HIGH (ref 70–99)
HBA1C BLD-MCNC: >15.5 % — HIGH (ref 4–5.6)
HDLC SERPL-MCNC: 56 MG/DL — SIGNIFICANT CHANGE UP
LIPID PNL WITH DIRECT LDL SERPL: SIGNIFICANT CHANGE UP
PHENYTOIN FREE SERPL-MCNC: 4.4 UG/ML — LOW (ref 10–20)
POTASSIUM SERPL-MCNC: 4.6 MMOL/L — SIGNIFICANT CHANGE UP (ref 3.5–5.3)
POTASSIUM SERPL-SCNC: 4.6 MMOL/L — SIGNIFICANT CHANGE UP (ref 3.5–5.3)
PROT SERPL-MCNC: 7.7 G/DL — SIGNIFICANT CHANGE UP (ref 6–8.3)
SODIUM SERPL-SCNC: 133 MMOL/L — LOW (ref 135–145)
TOTAL CHOLESTEROL/HDL RATIO MEASUREMENT: 6 RATIO — SIGNIFICANT CHANGE UP (ref 3.3–7.1)
TRIGL SERPL-MCNC: 624 MG/DL — HIGH (ref 10–149)

## 2018-09-25 PROCEDURE — G0463: CPT

## 2018-09-25 NOTE — ASSESSMENT
[FreeTextEntry1] : 48F with DMII (12.9% 6/18), Sarcoidosis on chronic steroids, CKDIII, seizure disorder on AED presents to the clinic for a follow up. \par

## 2018-09-25 NOTE — END OF VISIT
[] : Resident [FreeTextEntry3] : Uncontrolled DM- A1C today\par Sarcoidosis- back to see pulm and higher dose of steroids\par CKD\par Seizure dz- appt to see neuro this week\par Social issues affecting care- address this to assist with medical care

## 2018-09-25 NOTE — REVIEW OF SYSTEMS
[Fever] : no fever [Chills] : no chills [Fatigue] : no fatigue [Vision Problems] : no vision problems [Hearing Loss] : no hearing loss [Chest Pain] : no chest pain [Palpitations] : no palpitations [Orthopnea] : no orthopnea [Shortness Of Breath] : no shortness of breath [Wheezing] : no wheezing [Abdominal Pain] : no abdominal pain [Nausea] : no nausea [Vomiting] : no vomiting [Hematuria] : no hematuria [Joint Pain] : no joint pain [Skin Rash] : no skin rash [Headache] : no headache

## 2018-09-25 NOTE — PLAN
[FreeTextEntry1] : Sarcoidosis \par Currently stable \par Cont Prednisone 15 mg QD \par Follow Up w Pulmonary \par \par Seizure disorder \par Absence seizure 2/2 medication non-adherence\par Cont Vimpat and Phenytoin\par Send Phenytoin level today\par Neurology follow up\par \par DM2, on insulin\par Uncontrolled - A1c today > 14 \par Will continue Humalog to 20 units TID, increase 30 units  Basaglar\par \par Send CBC, CMP, A1c , Lipid Panel, Phenytoin level\par Pt referred to Case management for extensive social issues \par \par Will address healthcare maintenance during next visit\par \par Case discussed w Dr. Dill \par \par RTC in 5 weeks \par \par

## 2018-09-25 NOTE — HISTORY OF PRESENT ILLNESS
[FreeTextEntry1] : Follow Up [de-identified] : 48F with DMII (12.9% 6/18), Sarcoidosis on chronic steroids, CKDIII, seizure disorder on AED presents to the clinic for a follow up. \par \par Pt currently unemployed, unable to work 2/2 severe comorbid conditions, endorses medication non-adherence for multiple reasons including lack of adequate medication supplies. On chronic prednisone for Sarcoidosis however has been taking 10 mg QD instead of recommended 15 mg QD because of insufficient supply. Describes as her respiratory status as stable, improved exercise tolerance from 3 blocks to 5 blocks, denies fevers cough or recent illness. \par DM is uncontrolled, often skips third premeal dosing b/c pt "forgets," denies hyperglycemic episodes including headache dizziness, chest pain, diaphoresis. +Palpitations. -400 Premeal.\par Pt on Anti-epileptics and previously seizure free since December 2017, however  endorsing 1 absence seizure last month, w/o associated shaking, tongue biting or urinary/fecal incontinence, due to medications nonadherence. \par \par Needs public assistance papers filled out today.\par

## 2018-09-27 DIAGNOSIS — D86.9 SARCOIDOSIS, UNSPECIFIED: ICD-10-CM

## 2018-09-27 DIAGNOSIS — N18.3 CHRONIC KIDNEY DISEASE, STAGE 3 (MODERATE): ICD-10-CM

## 2018-09-27 DIAGNOSIS — E11.65 TYPE 2 DIABETES MELLITUS WITH HYPERGLYCEMIA: ICD-10-CM

## 2018-10-31 ENCOUNTER — APPOINTMENT (OUTPATIENT)
Dept: NEPHROLOGY | Facility: HOSPITAL | Age: 48
End: 2018-10-31
Payer: MEDICAID

## 2018-10-31 ENCOUNTER — APPOINTMENT (OUTPATIENT)
Dept: INTERNAL MEDICINE | Facility: CLINIC | Age: 48
End: 2018-10-31

## 2018-10-31 ENCOUNTER — OUTPATIENT (OUTPATIENT)
Dept: OUTPATIENT SERVICES | Facility: HOSPITAL | Age: 48
LOS: 1 days | End: 2018-10-31

## 2018-10-31 VITALS
HEART RATE: 100 BPM | HEIGHT: 61 IN | SYSTOLIC BLOOD PRESSURE: 142 MMHG | DIASTOLIC BLOOD PRESSURE: 88 MMHG | BODY MASS INDEX: 24.76 KG/M2 | WEIGHT: 131.13 LBS

## 2018-10-31 PROCEDURE — 99213 OFFICE O/P EST LOW 20 MIN: CPT | Mod: GC

## 2018-11-02 DIAGNOSIS — I10 ESSENTIAL (PRIMARY) HYPERTENSION: ICD-10-CM

## 2018-11-02 DIAGNOSIS — N18.3 CHRONIC KIDNEY DISEASE, STAGE 3 (MODERATE): ICD-10-CM

## 2018-11-12 ENCOUNTER — APPOINTMENT (OUTPATIENT)
Dept: PULMONOLOGY | Facility: CLINIC | Age: 48
End: 2018-11-12

## 2018-11-20 ENCOUNTER — APPOINTMENT (OUTPATIENT)
Dept: NEUROLOGY | Facility: HOSPITAL | Age: 48
End: 2018-11-20

## 2018-12-05 ENCOUNTER — APPOINTMENT (OUTPATIENT)
Dept: INTERNAL MEDICINE | Facility: CLINIC | Age: 48
End: 2018-12-05

## 2018-12-27 ENCOUNTER — RX RENEWAL (OUTPATIENT)
Age: 48
End: 2018-12-27

## 2018-12-28 ENCOUNTER — RX RENEWAL (OUTPATIENT)
Age: 48
End: 2018-12-28

## 2019-01-10 ENCOUNTER — APPOINTMENT (OUTPATIENT)
Dept: INTERNAL MEDICINE | Facility: CLINIC | Age: 49
End: 2019-01-10
Payer: MEDICAID

## 2019-01-10 ENCOUNTER — OUTPATIENT (OUTPATIENT)
Dept: OUTPATIENT SERVICES | Facility: HOSPITAL | Age: 49
LOS: 1 days | End: 2019-01-10
Payer: MEDICAID

## 2019-01-10 VITALS
WEIGHT: 124 LBS | BODY MASS INDEX: 23.41 KG/M2 | DIASTOLIC BLOOD PRESSURE: 70 MMHG | OXYGEN SATURATION: 99 % | SYSTOLIC BLOOD PRESSURE: 114 MMHG | HEART RATE: 92 BPM | HEIGHT: 61 IN

## 2019-01-10 DIAGNOSIS — I10 ESSENTIAL (PRIMARY) HYPERTENSION: ICD-10-CM

## 2019-01-10 PROCEDURE — G0463: CPT

## 2019-01-10 PROCEDURE — 99215 OFFICE O/P EST HI 40 MIN: CPT | Mod: GC

## 2019-01-10 RX ORDER — DULAGLUTIDE 0.75 MG/.5ML
0.75 INJECTION, SOLUTION SUBCUTANEOUS
Qty: 4 | Refills: 2 | Status: DISCONTINUED | COMMUNITY
Start: 2018-09-24 | End: 2019-01-10

## 2019-01-10 NOTE — PHYSICAL EXAM
[No Acute Distress] : no acute distress [Well Nourished] : well nourished [PERRL] : pupils equal round and reactive to light [EOMI] : extraocular movements intact [Normal Oropharynx] : the oropharynx was normal [No JVD] : no jugular venous distention [Supple] : supple [No Respiratory Distress] : no respiratory distress  [Clear to Auscultation] : lungs were clear to auscultation bilaterally

## 2019-01-16 ENCOUNTER — APPOINTMENT (OUTPATIENT)
Dept: RADIOLOGY | Facility: IMAGING CENTER | Age: 49
End: 2019-01-16

## 2019-01-16 ENCOUNTER — APPOINTMENT (OUTPATIENT)
Dept: PULMONOLOGY | Facility: CLINIC | Age: 49
End: 2019-01-16

## 2019-01-22 NOTE — END OF VISIT
[] : Resident [FreeTextEntry3] : Preventative visit\par addressed age appropr screening yet given many co-morbid conditions focused on DM

## 2019-01-22 NOTE — ASSESSMENT
[FreeTextEntry1] : 48F with DMII (15.5% 9/18), Sarcoidosis on chronic steroids, CKDIII, seizure disorder on AED presents to the clinic for a follow up. \par \par Age appropr screening\par \par IDDM\par Remains uncontrolled, a1C 14% today \par Will increase basal insulin to 35 units at bedtime \par Will begin to decrease Humalog to 20 units TID \par \par Sarcoidosis \par Symptoms well controlled on chronic Prednisone- Care as per Pulmonology\par \par Seizures \par Well controlled\par Cont Phenytoin and Vimpat \par \par RTC in 2 weeks and again in 5 weeks\par \par Case discussed w Dr. Dill

## 2019-01-22 NOTE — HISTORY OF PRESENT ILLNESS
[FreeTextEntry1] : CPE [de-identified] : 48F with DMII (15.5% 9/18), Sarcoidosis on chronic steroids, CKDIII, seizure disorder on AED presents to the clinic for a follow up. \par Pt accompanied by daughter who is present during the interview.\par \par Pt previously w many barriers to medications including financial limitations, and physical disabilities preventing adherence, however pt endorses that for past 1-2 months she has had no difficulty obtaining meds. \par \par  Pt taking Basaglar nighttime 25 units, and Humalog 25 units tid. Pt FS are as follows: , Lunch 200, Dinner 285, Bedtime 365.  Lowest - experiences dizziness and nausea, thus skips premeal dosing of insulin and eats to a repeat FS > 250-300 at which points symptoms improve. Denies symptoms of hyperglycemia, despite endorsing HA w neck pain that coincide w elevated FS. Insurance did not cover Trulicity so was never started on the new medication. \par \par Pt continues on chronic Prednisone 15 mg QD for maintenance of Sarcoidosis. Endorses improved cough. Physical activity remains limited  w exercise tolerance of 1 flight of stairs prior to becoming more fatigued. Follows w Dr. Leslie of Pulmonology w appt 1/16/18. \par \par Pt is adherent to AED, denies hx of seizures since last visit.\par \par Pt intermittently taking Lexapro w Xanax PRN when feeling down, however endorses well controlled depressive symptoms. Denies worsening anhedonia, feelings of ill-will or suicidality.

## 2019-01-22 NOTE — HEALTH RISK ASSESSMENT
[Fair] : ~his/her~ current health as fair  [Good] : ~his/her~  mood as  good [No falls in past year] : Patient reported no falls in the past year [0] : 2) Feeling down, depressed, or hopeless: Not at all (0) [] : No

## 2019-01-22 NOTE — REVIEW OF SYSTEMS
[Fatigue] : fatigue [Dyspnea on Exertion] : dyspnea on exertion [Nausea] : nausea [Skin Rash] : skin rash [Headache] : headache [Dizziness] : dizziness [Fever] : no fever [Chills] : no chills [Vision Problems] : no vision problems [Earache] : no earache [Hearing Loss] : no hearing loss [Chest Pain] : no chest pain [Palpitations] : no palpitations [Shortness Of Breath] : no shortness of breath [Cough] : no cough [Abdominal Pain] : no abdominal pain [Constipation] : no constipation [Vomiting] : no vomiting [Dysuria] : no dysuria [Incontinence] : no incontinence [Hematuria] : no hematuria [Frequency] : no frequency [Joint Pain] : no joint pain [Muscle Pain] : no muscle pain [Depression] : no depression

## 2019-01-23 DIAGNOSIS — E11.65 TYPE 2 DIABETES MELLITUS WITH HYPERGLYCEMIA: ICD-10-CM

## 2019-01-23 DIAGNOSIS — B36.9 SUPERFICIAL MYCOSIS, UNSPECIFIED: ICD-10-CM

## 2019-01-28 ENCOUNTER — INPATIENT (INPATIENT)
Facility: HOSPITAL | Age: 49
LOS: 3 days | Discharge: HOME | End: 2019-02-01
Attending: HOSPITALIST | Admitting: HOSPITALIST
Payer: MEDICAID

## 2019-01-28 VITALS
DIASTOLIC BLOOD PRESSURE: 81 MMHG | TEMPERATURE: 103 F | OXYGEN SATURATION: 100 % | HEART RATE: 124 BPM | SYSTOLIC BLOOD PRESSURE: 134 MMHG | RESPIRATION RATE: 22 BRPM

## 2019-01-28 DIAGNOSIS — A41.9 SEPSIS, UNSPECIFIED ORGANISM: ICD-10-CM

## 2019-01-28 DIAGNOSIS — N17.9 ACUTE KIDNEY FAILURE, UNSPECIFIED: ICD-10-CM

## 2019-01-28 DIAGNOSIS — N39.0 URINARY TRACT INFECTION, SITE NOT SPECIFIED: ICD-10-CM

## 2019-01-28 DIAGNOSIS — G40.909 EPILEPSY, UNSPECIFIED, NOT INTRACTABLE, WITHOUT STATUS EPILEPTICUS: ICD-10-CM

## 2019-01-28 DIAGNOSIS — J09.X1 INFLUENZA DUE TO IDENTIFIED NOVEL INFLUENZA A VIRUS WITH PNEUMONIA: ICD-10-CM

## 2019-01-28 DIAGNOSIS — D86.9 SARCOIDOSIS, UNSPECIFIED: ICD-10-CM

## 2019-01-28 DIAGNOSIS — I10 ESSENTIAL (PRIMARY) HYPERTENSION: ICD-10-CM

## 2019-01-28 DIAGNOSIS — E11.22 TYPE 2 DIABETES MELLITUS WITH DIABETIC CHRONIC KIDNEY DISEASE: ICD-10-CM

## 2019-01-28 DIAGNOSIS — Z29.9 ENCOUNTER FOR PROPHYLACTIC MEASURES, UNSPECIFIED: ICD-10-CM

## 2019-01-28 DIAGNOSIS — E87.2 ACIDOSIS: ICD-10-CM

## 2019-01-28 LAB
ALBUMIN SERPL ELPH-MCNC: 4.1 G/DL — SIGNIFICANT CHANGE UP (ref 3.3–5)
ALP SERPL-CCNC: 46 U/L — SIGNIFICANT CHANGE UP (ref 40–120)
ALT FLD-CCNC: 5 U/L — SIGNIFICANT CHANGE UP (ref 4–33)
AMORPH CRY # UR COMP ASSIST: SIGNIFICANT CHANGE UP (ref 0–0)
ANION GAP SERPL CALC-SCNC: 20 MMO/L — HIGH (ref 7–14)
APPEARANCE UR: SIGNIFICANT CHANGE UP
AST SERPL-CCNC: 12 U/L — SIGNIFICANT CHANGE UP (ref 4–32)
B PERT DNA SPEC QL NAA+PROBE: NOT DETECTED — SIGNIFICANT CHANGE UP
BACTERIA # UR AUTO: HIGH
BASE EXCESS BLDV CALC-SCNC: -0.8 MMOL/L — SIGNIFICANT CHANGE UP
BASE EXCESS BLDV CALC-SCNC: -7.5 MMOL/L — SIGNIFICANT CHANGE UP
BASOPHILS # BLD AUTO: 0.02 K/UL — SIGNIFICANT CHANGE UP (ref 0–0.2)
BASOPHILS NFR BLD AUTO: 0.2 % — SIGNIFICANT CHANGE UP (ref 0–2)
BILIRUB SERPL-MCNC: 0.7 MG/DL — SIGNIFICANT CHANGE UP (ref 0.2–1.2)
BILIRUB UR-MCNC: NEGATIVE — SIGNIFICANT CHANGE UP
BLOOD GAS VENOUS - CREATININE: 1.15 MG/DL — SIGNIFICANT CHANGE UP (ref 0.5–1.3)
BLOOD GAS VENOUS - CREATININE: 1.67 MG/DL — HIGH (ref 0.5–1.3)
BLOOD UR QL VISUAL: HIGH
BUN SERPL-MCNC: 27 MG/DL — HIGH (ref 7–23)
C PNEUM DNA SPEC QL NAA+PROBE: NOT DETECTED — SIGNIFICANT CHANGE UP
CALCIUM SERPL-MCNC: 9.6 MG/DL — SIGNIFICANT CHANGE UP (ref 8.4–10.5)
CHLORIDE BLDV-SCNC: 101 MMOL/L — SIGNIFICANT CHANGE UP (ref 96–108)
CHLORIDE BLDV-SCNC: 110 MMOL/L — HIGH (ref 96–108)
CHLORIDE SERPL-SCNC: 97 MMOL/L — LOW (ref 98–107)
CO2 SERPL-SCNC: 21 MMOL/L — LOW (ref 22–31)
COLOR SPEC: YELLOW — SIGNIFICANT CHANGE UP
CREAT SERPL-MCNC: 1.7 MG/DL — HIGH (ref 0.5–1.3)
EOSINOPHIL # BLD AUTO: 0.03 K/UL — SIGNIFICANT CHANGE UP (ref 0–0.5)
EOSINOPHIL NFR BLD AUTO: 0.3 % — SIGNIFICANT CHANGE UP (ref 0–6)
FLUAV H1 2009 PAND RNA SPEC QL NAA+PROBE: NOT DETECTED — SIGNIFICANT CHANGE UP
FLUAV H1 RNA SPEC QL NAA+PROBE: NOT DETECTED — SIGNIFICANT CHANGE UP
FLUAV H3 RNA SPEC QL NAA+PROBE: DETECTED — HIGH
FLUBV RNA SPEC QL NAA+PROBE: NOT DETECTED — SIGNIFICANT CHANGE UP
GAS PNL BLDV: 132 MMOL/L — LOW (ref 136–146)
GAS PNL BLDV: 137 MMOL/L — SIGNIFICANT CHANGE UP (ref 136–146)
GLUCOSE BLDV-MCNC: 282 — HIGH (ref 70–99)
GLUCOSE BLDV-MCNC: 374 — HIGH (ref 70–99)
GLUCOSE SERPL-MCNC: 280 MG/DL — HIGH (ref 70–99)
GLUCOSE UR-MCNC: 1000 — HIGH
GRAN CASTS # UR COMP ASSIST: SIGNIFICANT CHANGE UP
HADV DNA SPEC QL NAA+PROBE: NOT DETECTED — SIGNIFICANT CHANGE UP
HCO3 BLDV-SCNC: 19 MMOL/L — LOW (ref 20–27)
HCO3 BLDV-SCNC: 22 MMOL/L — SIGNIFICANT CHANGE UP (ref 20–27)
HCOV PNL SPEC NAA+PROBE: SIGNIFICANT CHANGE UP
HCT VFR BLD CALC: 35.1 % — SIGNIFICANT CHANGE UP (ref 34.5–45)
HCT VFR BLDV CALC: 29.5 % — LOW (ref 34.5–45)
HCT VFR BLDV CALC: 37.2 % — SIGNIFICANT CHANGE UP (ref 34.5–45)
HGB BLD-MCNC: 11.4 G/DL — LOW (ref 11.5–15.5)
HGB BLDV-MCNC: 12.1 G/DL — SIGNIFICANT CHANGE UP (ref 11.5–15.5)
HGB BLDV-MCNC: 9.5 G/DL — LOW (ref 11.5–15.5)
HMPV RNA SPEC QL NAA+PROBE: NOT DETECTED — SIGNIFICANT CHANGE UP
HPIV1 RNA SPEC QL NAA+PROBE: NOT DETECTED — SIGNIFICANT CHANGE UP
HPIV2 RNA SPEC QL NAA+PROBE: NOT DETECTED — SIGNIFICANT CHANGE UP
HPIV3 RNA SPEC QL NAA+PROBE: NOT DETECTED — SIGNIFICANT CHANGE UP
HPIV4 RNA SPEC QL NAA+PROBE: NOT DETECTED — SIGNIFICANT CHANGE UP
IMM GRANULOCYTES NFR BLD AUTO: 1.4 % — SIGNIFICANT CHANGE UP (ref 0–1.5)
KETONES UR-MCNC: SIGNIFICANT CHANGE UP
LACTATE BLDV-MCNC: 1.1 MMOL/L — SIGNIFICANT CHANGE UP (ref 0.5–2)
LACTATE BLDV-MCNC: 3.2 MMOL/L — HIGH (ref 0.5–2)
LEUKOCYTE ESTERASE UR-ACNC: NEGATIVE — SIGNIFICANT CHANGE UP
LYMPHOCYTES # BLD AUTO: 1.06 K/UL — SIGNIFICANT CHANGE UP (ref 1–3.3)
LYMPHOCYTES # BLD AUTO: 11.2 % — LOW (ref 13–44)
MCHC RBC-ENTMCNC: 27 PG — SIGNIFICANT CHANGE UP (ref 27–34)
MCHC RBC-ENTMCNC: 32.5 % — SIGNIFICANT CHANGE UP (ref 32–36)
MCV RBC AUTO: 83.2 FL — SIGNIFICANT CHANGE UP (ref 80–100)
MONOCYTES # BLD AUTO: 0.76 K/UL — SIGNIFICANT CHANGE UP (ref 0–0.9)
MONOCYTES NFR BLD AUTO: 8.1 % — SIGNIFICANT CHANGE UP (ref 2–14)
NEUTROPHILS # BLD AUTO: 7.44 K/UL — HIGH (ref 1.8–7.4)
NEUTROPHILS NFR BLD AUTO: 78.8 % — HIGH (ref 43–77)
NITRITE UR-MCNC: NEGATIVE — SIGNIFICANT CHANGE UP
NRBC # FLD: 0 K/UL — LOW (ref 25–125)
PCO2 BLDV: 30 MMHG — LOW (ref 41–51)
PCO2 BLDV: 45 MMHG — SIGNIFICANT CHANGE UP (ref 41–51)
PH BLDV: 7.35 PH — SIGNIFICANT CHANGE UP (ref 7.32–7.43)
PH BLDV: 7.37 PH — SIGNIFICANT CHANGE UP (ref 7.32–7.43)
PH UR: 6 — SIGNIFICANT CHANGE UP (ref 5–8)
PLATELET # BLD AUTO: 301 K/UL — SIGNIFICANT CHANGE UP (ref 150–400)
PMV BLD: 9.7 FL — SIGNIFICANT CHANGE UP (ref 7–13)
PO2 BLDV: 99 MMHG — HIGH (ref 35–40)
PO2 BLDV: < 24 MMHG — LOW (ref 35–40)
POTASSIUM BLDV-SCNC: 4.3 MMOL/L — SIGNIFICANT CHANGE UP (ref 3.4–4.5)
POTASSIUM BLDV-SCNC: 4.7 MMOL/L — HIGH (ref 3.4–4.5)
POTASSIUM SERPL-MCNC: 4.7 MMOL/L — SIGNIFICANT CHANGE UP (ref 3.5–5.3)
POTASSIUM SERPL-SCNC: 4.7 MMOL/L — SIGNIFICANT CHANGE UP (ref 3.5–5.3)
PROT SERPL-MCNC: 8.8 G/DL — HIGH (ref 6–8.3)
PROT UR-MCNC: >600 — HIGH
RBC # BLD: 4.22 M/UL — SIGNIFICANT CHANGE UP (ref 3.8–5.2)
RBC # FLD: 11.6 % — SIGNIFICANT CHANGE UP (ref 10.3–14.5)
RBC CASTS # UR COMP ASSIST: SIGNIFICANT CHANGE UP (ref 0–?)
RSV RNA SPEC QL NAA+PROBE: NOT DETECTED — SIGNIFICANT CHANGE UP
RV+EV RNA SPEC QL NAA+PROBE: NOT DETECTED — SIGNIFICANT CHANGE UP
SAO2 % BLDV: 20.6 % — LOW (ref 60–85)
SAO2 % BLDV: 97.7 % — HIGH (ref 60–85)
SODIUM SERPL-SCNC: 138 MMOL/L — SIGNIFICANT CHANGE UP (ref 135–145)
SP GR SPEC: 1.02 — SIGNIFICANT CHANGE UP (ref 1–1.04)
SQUAMOUS # UR AUTO: SIGNIFICANT CHANGE UP
UROBILINOGEN FLD QL: NORMAL — SIGNIFICANT CHANGE UP
WBC # BLD: 9.44 K/UL — SIGNIFICANT CHANGE UP (ref 3.8–10.5)
WBC # FLD AUTO: 9.44 K/UL — SIGNIFICANT CHANGE UP (ref 3.8–10.5)
WBC UR QL: HIGH (ref 0–?)

## 2019-01-28 PROCEDURE — 71046 X-RAY EXAM CHEST 2 VIEWS: CPT | Mod: 26

## 2019-01-28 PROCEDURE — 71250 CT THORAX DX C-: CPT | Mod: 26

## 2019-01-28 RX ORDER — AZITHROMYCIN 500 MG/1
500 TABLET, FILM COATED ORAL ONCE
Qty: 0 | Refills: 0 | Status: COMPLETED | OUTPATIENT
Start: 2019-01-28 | End: 2019-01-28

## 2019-01-28 RX ORDER — ATORVASTATIN CALCIUM 80 MG/1
40 TABLET, FILM COATED ORAL AT BEDTIME
Qty: 0 | Refills: 0 | Status: DISCONTINUED | OUTPATIENT
Start: 2019-01-28 | End: 2019-02-01

## 2019-01-28 RX ORDER — AZITHROMYCIN 500 MG/1
500 TABLET, FILM COATED ORAL EVERY 24 HOURS
Qty: 0 | Refills: 0 | Status: DISCONTINUED | OUTPATIENT
Start: 2019-01-29 | End: 2019-02-01

## 2019-01-28 RX ORDER — INSULIN GLARGINE 100 [IU]/ML
24 INJECTION, SOLUTION SUBCUTANEOUS AT BEDTIME
Qty: 0 | Refills: 0 | Status: DISCONTINUED | OUTPATIENT
Start: 2019-01-28 | End: 2019-01-28

## 2019-01-28 RX ORDER — INSULIN LISPRO 100/ML
VIAL (ML) SUBCUTANEOUS AT BEDTIME
Qty: 0 | Refills: 0 | Status: DISCONTINUED | OUTPATIENT
Start: 2019-01-28 | End: 2019-02-01

## 2019-01-28 RX ORDER — LACOSAMIDE 50 MG/1
150 TABLET ORAL
Qty: 0 | Refills: 0 | Status: DISCONTINUED | OUTPATIENT
Start: 2019-01-28 | End: 2019-01-31

## 2019-01-28 RX ORDER — INSULIN LISPRO 100/ML
16 VIAL (ML) SUBCUTANEOUS
Qty: 0 | Refills: 0 | Status: DISCONTINUED | OUTPATIENT
Start: 2019-01-28 | End: 2019-01-29

## 2019-01-28 RX ORDER — ACETAMINOPHEN 500 MG
1000 TABLET ORAL ONCE
Qty: 0 | Refills: 0 | Status: COMPLETED | OUTPATIENT
Start: 2019-01-28 | End: 2019-01-28

## 2019-01-28 RX ORDER — SODIUM CHLORIDE 9 MG/ML
1000 INJECTION INTRAMUSCULAR; INTRAVENOUS; SUBCUTANEOUS
Qty: 0 | Refills: 0 | Status: DISCONTINUED | OUTPATIENT
Start: 2019-01-28 | End: 2019-01-29

## 2019-01-28 RX ORDER — ACETAMINOPHEN 500 MG
975 TABLET ORAL ONCE
Qty: 0 | Refills: 0 | Status: COMPLETED | OUTPATIENT
Start: 2019-01-28 | End: 2019-01-28

## 2019-01-28 RX ORDER — AMLODIPINE BESYLATE 2.5 MG/1
10 TABLET ORAL DAILY
Qty: 0 | Refills: 0 | Status: DISCONTINUED | OUTPATIENT
Start: 2019-01-28 | End: 2019-01-28

## 2019-01-28 RX ORDER — INSULIN GLARGINE 100 [IU]/ML
30 INJECTION, SOLUTION SUBCUTANEOUS AT BEDTIME
Qty: 0 | Refills: 0 | Status: DISCONTINUED | OUTPATIENT
Start: 2019-01-28 | End: 2019-01-28

## 2019-01-28 RX ORDER — ESCITALOPRAM OXALATE 10 MG/1
10 TABLET, FILM COATED ORAL
Qty: 0 | Refills: 0 | Status: DISCONTINUED | OUTPATIENT
Start: 2019-01-28 | End: 2019-02-01

## 2019-01-28 RX ORDER — INSULIN LISPRO 100/ML
VIAL (ML) SUBCUTANEOUS
Qty: 0 | Refills: 0 | Status: DISCONTINUED | OUTPATIENT
Start: 2019-01-28 | End: 2019-02-01

## 2019-01-28 RX ORDER — INSULIN GLARGINE 100 [IU]/ML
28 INJECTION, SOLUTION SUBCUTANEOUS AT BEDTIME
Qty: 0 | Refills: 0 | Status: DISCONTINUED | OUTPATIENT
Start: 2019-01-28 | End: 2019-01-29

## 2019-01-28 RX ORDER — DEXTROSE 50 % IN WATER 50 %
25 SYRINGE (ML) INTRAVENOUS ONCE
Qty: 0 | Refills: 0 | Status: DISCONTINUED | OUTPATIENT
Start: 2019-01-28 | End: 2019-02-01

## 2019-01-28 RX ORDER — CEFTRIAXONE 500 MG/1
1 INJECTION, POWDER, FOR SOLUTION INTRAMUSCULAR; INTRAVENOUS EVERY 24 HOURS
Qty: 0 | Refills: 0 | Status: DISCONTINUED | OUTPATIENT
Start: 2019-01-29 | End: 2019-02-01

## 2019-01-28 RX ORDER — SODIUM CHLORIDE 9 MG/ML
2000 INJECTION INTRAMUSCULAR; INTRAVENOUS; SUBCUTANEOUS ONCE
Qty: 0 | Refills: 0 | Status: COMPLETED | OUTPATIENT
Start: 2019-01-28 | End: 2019-01-28

## 2019-01-28 RX ORDER — AMLODIPINE BESYLATE 2.5 MG/1
10 TABLET ORAL DAILY
Qty: 0 | Refills: 0 | Status: DISCONTINUED | OUTPATIENT
Start: 2019-01-28 | End: 2019-01-29

## 2019-01-28 RX ORDER — DEXTROSE 50 % IN WATER 50 %
15 SYRINGE (ML) INTRAVENOUS ONCE
Qty: 0 | Refills: 0 | Status: DISCONTINUED | OUTPATIENT
Start: 2019-01-28 | End: 2019-02-01

## 2019-01-28 RX ORDER — PANTOPRAZOLE SODIUM 20 MG/1
40 TABLET, DELAYED RELEASE ORAL
Qty: 0 | Refills: 0 | Status: DISCONTINUED | OUTPATIENT
Start: 2019-01-28 | End: 2019-02-01

## 2019-01-28 RX ORDER — CEFTRIAXONE 500 MG/1
1 INJECTION, POWDER, FOR SOLUTION INTRAMUSCULAR; INTRAVENOUS ONCE
Qty: 0 | Refills: 0 | Status: COMPLETED | OUTPATIENT
Start: 2019-01-28 | End: 2019-01-28

## 2019-01-28 RX ORDER — ACETAMINOPHEN 500 MG
650 TABLET ORAL EVERY 6 HOURS
Qty: 0 | Refills: 0 | Status: DISCONTINUED | OUTPATIENT
Start: 2019-01-28 | End: 2019-02-01

## 2019-01-28 RX ORDER — INSULIN LISPRO 100/ML
14 VIAL (ML) SUBCUTANEOUS
Qty: 0 | Refills: 0 | Status: DISCONTINUED | OUTPATIENT
Start: 2019-01-28 | End: 2019-01-28

## 2019-01-28 RX ADMIN — SODIUM CHLORIDE 2000 MILLILITER(S): 9 INJECTION INTRAMUSCULAR; INTRAVENOUS; SUBCUTANEOUS at 22:36

## 2019-01-28 RX ADMIN — Medication 400 MILLIGRAM(S): at 20:26

## 2019-01-28 RX ADMIN — SODIUM CHLORIDE 75 MILLILITER(S): 9 INJECTION INTRAMUSCULAR; INTRAVENOUS; SUBCUTANEOUS at 23:16

## 2019-01-28 RX ADMIN — AZITHROMYCIN 250 MILLIGRAM(S): 500 TABLET, FILM COATED ORAL at 16:35

## 2019-01-28 RX ADMIN — Medication 125 MILLIGRAM(S): at 16:35

## 2019-01-28 RX ADMIN — SODIUM CHLORIDE 2000 MILLILITER(S): 9 INJECTION INTRAMUSCULAR; INTRAVENOUS; SUBCUTANEOUS at 15:58

## 2019-01-28 RX ADMIN — CEFTRIAXONE 100 GRAM(S): 500 INJECTION, POWDER, FOR SOLUTION INTRAMUSCULAR; INTRAVENOUS at 15:58

## 2019-01-28 RX ADMIN — Medication 975 MILLIGRAM(S): at 16:00

## 2019-01-28 NOTE — ED PROVIDER NOTE - PROGRESS NOTE DETAILS
gilbert: pt signed out to me from dr quach & dr chappell. will adm for r middle lobe pna, given abx & ivf. cannot get 30 cc/kg bolus for sepsis give hx ckd

## 2019-01-28 NOTE — H&P ADULT - NSHPPHYSICALEXAM_GEN_ALL_CORE
Vital Signs Last 24 Hrs  T(C): 36.7 (28 Jan 2019 19:29), Max: 39.7 (28 Jan 2019 15:59)  T(F): 98.1 (28 Jan 2019 19:29), Max: 103.4 (28 Jan 2019 15:59)  HR: 83 (28 Jan 2019 20:12) (83 - 126)  BP: 138/85 (28 Jan 2019 20:12) (128/80 - 138/85)  RR: 23 (28 Jan 2019 19:29) (21 - 23)  SpO2: 100% (28 Jan 2019 19:29) (98% - 100%)    General: WN/WD NAD  Neurology: A&Ox3, nonfocal, DAVID x 4  Eyes: PERRLA/ EOMI, Gross vision intact  ENT/Neck: Neck supple, trachea midline, No JVD, Gross hearing intact  Respiratory: mild right basilar crackles, No wheezing, rales, rhonchi  CV: RRR, S1S2, no murmurs, rubs or gallops  Abdominal: mild TTP at b/l lower quadrants, ND +BS  Extremities: No edema, + peripheral pulses  Skin: No Rashes, Hematoma, Ecchymosis

## 2019-01-28 NOTE — ED PROVIDER NOTE - ATTENDING CONTRIBUTION TO CARE
I performed a history and physical exam of the patient and discussed their management with the resident and /or advanced care provider. I reviewed the resident and /or ACP's note and agree with the documented findings and plan of care. My medical decison making and observations are found above.  Lungs clear, abd soft.

## 2019-01-28 NOTE — H&P ADULT - PROBLEM SELECTOR PLAN 4
- SCr 1.7 (baseline ~1.4), CKD stage 3  - likely pre-renal in setting of poor sepsis, poor PO intake, and GI losses (n/v/d)  - s/p 2L bolus in ED, c/w IVF   - trend SCr, renally dose medications, avoid nephrotoxic medications

## 2019-01-28 NOTE — H&P ADULT - NSHPOUTPATIENTPROVIDERS_GEN_ALL_CORE
PCP - [facility]   Pulmonology - Dr. Leslie  Nephrology - Dr. Jose M Garibay PCP - [facility] (Dr. Ash Marquez)  Pulmonology - Dr. Leslie  Nephrology - Dr. Jose M Garibay

## 2019-01-28 NOTE — H&P ADULT - PROBLEM SELECTOR PROBLEM 7
Type 2 diabetes mellitus with chronic kidney disease, with long-term current use of insulin, unspecified CKD stage Seizure disorder

## 2019-01-28 NOTE — H&P ADULT - PROBLEM SELECTOR PLAN 1
- tachycardic, febrile Tm 103, lactate 3.2  - RVP + influenza, CXR with bilateral opacities R > L  - BCx and UCx pending, s/p 2L NS bolus, will continue with maintenance fluids  - pt. on chronic steroids at home, s/p solu-medrol 125mg IV in ED, however, pt. not hypotensive prior to solu-medrol, and currrently maintaining blood pressures, thus will continue with home dose of prednisone 15mg daily, if becomes hypotensive resume stress dose steroids  - repeat VBG, abx coverage with CTX and azithromycin for PNA - tachycardic, febrile Tm 103, lactate 3.2  - RVP + influenza, CXR with bilateral opacities R > L  - BCx and UCx pending, s/p 2L NS bolus, will continue with maintenance fluids  - initially elevated lactate now wnl  - pt. on chronic steroids at home, s/p solu-medrol 125mg IV in ED, however, pt. not hypotensive prior to solu-medrol, and currrently maintaining blood pressures, thus will continue with home dose of prednisone 15mg daily, if becomes hypotensive resume stress dose steroids  - repeat VBG, abx coverage with CTX and azithromycin for PNA

## 2019-01-28 NOTE — H&P ADULT - NSHPLABSRESULTS_GEN_ALL_CORE
11.4   9.44  )-----------( 301      ( 28 Jan 2019 13:15 )             35.1     01-28    138  |  97<L>  |  27<H>  ----------------------------<  280<H>  4.7   |  21<L>  |  1.70<H>    Ca    9.6      28 Jan 2019 13:15    TPro  8.8<H>  /  Alb  4.1  /  TBili  0.7  /  DBili  x   /  AST  12  /  ALT  5   /  AlkPhos  46  01-28    < from: Xray Chest 2 Views PA/Lat (01.28.19 @ 15:04) >    IMPRESSION:  Redemonstrated bibasilar reticular interstitial opacities right greater   than left reflecting scarring/bronchiectasis, however component of   superimposed infection cannot  be excluded.     Grossly clear upper lungs. No pleural effusions or pneumothorax.     Cardiac and mediastinal silhouettes within normal limits.    Trachea midline.     Unremarkable osseous structures.    < end of copied text >     138  |  97<L>  |  27<H>  ----------------------------<  280<H>  4.7   |  21<L>  |  1.70<H>    Ca    9.6      2019 13:15    TPro  8.8<H>  /  Alb  4.1  /  TBili  0.7  /  DBili  x   /  AST  12  /  ALT  5   /  AlkPhos  46                          11.4   9.44  )-----------( 301      ( 2019 13:15 )             35.1     Urinalysis Basic - ( 2019 13:15 )  Color: YELLOW / Appearance: Lt TURBID / S.024 / pH: 6.0  Gluc: 1000 / Ketone: SMALL  / Bili: NEGATIVE / Urobili: NORMAL   Blood: MODERATE / Protein: >600 / Nitrite: NEGATIVE   Leuk Esterase: NEGATIVE / RBC: 3-5 / WBC 11-25   Sq Epi: MODERATE / Non Sq Epi: x / Bacteria: MANY    22:00 - VBG - pH: 7.37  | pCO2: 30    | pO2: 99    | Lactate: 1.1    13:15 - VBG - pH: 7.35  | pCO2: 45    | pO2: < 24  | Lactate: 3.2  \    [facility] Detect[facility] (19 @ 13:15)    < from: Xray Chest 2 Views PA/Lat (19 @ 15:04) >  IMPRESSION: Redemonstrated bibasilar reticular interstitial opacities right greater than left reflecting scarring/bronchiectasis, however component of superimposed infection cannot  be excluded. Grossly clear upper lungs. No pleural effusions or pneumothorax. Cardiac and mediastinal silhouettes within normal limits. Trachea midline. Unremarkable osseous structures.  < end of copied text >     138  |  97<L>  |  27<H>  ----------------------------<  280<H>  4.7   |  21<L>  |  1.70<H>    Ca    9.6      2019 13:15    TPro  8.8<H>  /  Alb  4.1  /  TBili  0.7  /  DBili  x   /  AST  12  /  ALT  5   /  AlkPhos  46                          11.4   9.44  )-----------( 301      ( 2019 13:15 )             35.1     Urinalysis Basic - ( 2019 13:15 )  Color: YELLOW / Appearance: Lt TURBID / S.024 / pH: 6.0  Gluc: 1000 / Ketone: SMALL  / Bili: NEGATIVE / Urobili: NORMAL   Blood: MODERATE / Protein: >600 / Nitrite: NEGATIVE   Leuk Esterase: NEGATIVE / RBC: 3-5 / WBC 11-25   Sq Epi: MODERATE / Non Sq Epi: x / Bacteria: MANY    22:00 - VBG - pH: 7.37  | pCO2: 30    | pO2: 99    | Lactate: 1.1    13:15 - VBG - pH: 7.35  | pCO2: 45    | pO2: < 24  | Lactate: 3.2  \    [facility] (19 @ 13:15)    < from: Xray Chest 2 Views PA/Lat (19 @ 15:04) >  IMPRESSION: Redemonstrated bibasilar reticular interstitial opacities right greater than left reflecting scarring/bronchiectasis, however component of superimposed infection cannot  be excluded. Grossly clear upper lungs. No pleural effusions or pneumothorax. Cardiac and mediastinal silhouettes within normal limits. Trachea midline. Unremarkable osseous structures.  < end of copied text >    EKG ordered and pending

## 2019-01-28 NOTE — H&P ADULT - PROBLEM SELECTOR PROBLEM 8
Hypertension Type 2 diabetes mellitus with chronic kidney disease, with long-term current use of insulin, unspecified CKD stage

## 2019-01-28 NOTE — ED PROVIDER NOTE - OBJECTIVE STATEMENT
48F presents to ED with 2 week history of subjective fever, cough, rhinorrhea, body aches.  Patient reports that multiple family members have the flu and they are getting better while she seems to be getting worse.  Patient reports she took Tylenol once 3 days ago but no other medications since.  Patient also reports 4 episodes of vomiting and diarrhea since last night.  Patient denies chest pain, abdominal pain.

## 2019-01-28 NOTE — H&P ADULT - ATTENDING COMMENTS
48-year-old female with sarcoidosis, seizure disorder, IDDM2, CKD3, HTN, HLD, presenting with 3 weeks of URI sx, recent URI sx in all her household members, found to have sepsis secondary to influenza A, on Tamiflu.  Covering for UTI given dysuria (contaminated UA with neg LE/N, Cx pending) and possible superimposed pna, f/u CT chest. Patient reports nausea/vomiting/diarrhea, abdominal pain, poor PO intake in setting of (+)influenza - admits she has not taken her basal or bolus insulin for 3 weeks as a result of poor PO intake.  Patient given lantus 28, discontinued, also discontinued pre-meal insulin, monitor on SS for now and dose insulin pending FS tomorrow.  Patient notes she had R sided chest pain described as a tightness in the ED, not pleuritic in nature, no known history of heart disease, resolved after a few hours, not reproducible on exam, associated with shortness of breath and diaphoresis.  Ordered for EKG, f/u.  Trop added onto presenting labs and repeat with AM labs. Agree with resident H&P and plan as edited above. 48-year-old female with sarcoidosis, seizure disorder, IDDM2, CKD3, HTN, HLD, presenting with 3 weeks of URI sx, recent URI sx in all her household members, found to have sepsis secondary to influenza A, on Tamiflu.  Covering for UTI given dysuria (contaminated UA with neg LE/N, Cx pending) and possible superimposed pna, f/u CT chest. Patient reports nausea/vomiting/diarrhea, abdominal pain, poor PO intake in setting of (+)influenza - admits she has not taken her basal or bolus insulin for 3 weeks as a result of poor PO intake.  Patient given lantus 28, discontinued, also discontinued pre-meal insulin, monitor on SS for now and dose insulin pending FS for 1/29 PM.  Patient notes she had R sided chest pain described as a tightness in the ED, not pleuritic in nature, no known history of heart disease, resolved after a few hours, not reproducible on exam, associated with shortness of breath and diaphoresis.  Ordered for EKG, f/u.  Trop added onto presenting labs and repeat with AM labs. Agree with resident H&P and plan as edited above.

## 2019-01-28 NOTE — H&P ADULT - PMH
Chronic kidney disease (CKD), stage III (moderate)    Diabetes    Hyperlipidemia    Hypertension    Sarcoidosis    Seizure disorder

## 2019-01-28 NOTE — H&P ADULT - HISTORY OF PRESENT ILLNESS
48F hx of sarcoidosis (dx 2010 via biopsy showing noncaseating granulomas on prednisone), seizure disorder, CKD stage 3, HTN, HLD, who presents with fevers and cough. Over the past 3 weeks pt. has not been feeling well, with subjective fevers at home as well as productive cough with yellow sputum. Of note, pt. had multiple family members at home with the flu, however they recovered quickly while pt. continued to get worse. She has been unable to tolerate PO intake, and began to have nausea/vomiting/diarrhea a few days ago associated with diffuse, crampy abdominal pain. She denies any blood in vomitus or BMs. She took tylenol one day and nyquil last night, but denies antibiotic use or recent hospitalizations. In addition, this morning she had dysuria.  She denies headache, neck stiffness, chest pain, shortness of breath, rashes, joint pains, recent travel.     In the ED - Tm 103.4, , /81, RR 22, 100% on RA  Labs notable for SCr. 1.7, anion gap 20, lactate 3.2, UA with moderate blood, protein, and bacteria, RVP +Influenza  CXR with bibasilar reticular interstitial opacities right greater than left   s/p ceftriaxone, azithromycin, 2L NS bolus, tyelnol, solu-medrol 125mg IV x1 48F hx of sarcoidosis (dx 2010 via biopsy showing noncaseating granulomas on prednisone), seizure disorder, T2DM (A1C >15.5% in 09/2018), CKD stage 3, HTN, HLD, who presents with fevers and cough. Over the past 3 weeks pt. has not been feeling well, with subjective fevers at home as well as productive cough with yellow sputum. Of note, pt. had multiple family members at home with the flu, however they recovered quickly while pt. continued to get worse. She has been unable to tolerate PO intake, and began to have nausea/vomiting/diarrhea a few days ago associated with diffuse, crampy abdominal pain. She denies any blood in vomitus or BMs. She took tylenol one day and nyquil last night, but denies antibiotic use or recent hospitalizations. In addition, this morning she had dysuria.  She denies headache, neck stiffness, chest pain, shortness of breath, rashes, joint pains, recent travel.     In the ED - Tm 103.4, , /81, RR 22, 100% on RA  Labs notable for SCr. 1.7, anion gap 20, lactate 3.2, UA with moderate blood, protein, and bacteria, RVP +Influenza  CXR with bibasilar reticular interstitial opacities right greater than left   s/p ceftriaxone, azithromycin, 2L NS bolus, tyelnol, solu-medrol 125mg IV x1

## 2019-01-28 NOTE — H&P ADULT - ASSESSMENT
48F hx of sarcoidosis (dx 2010 via biopsy showing noncaseating granulomas on prednisone), seizure disorder, CKD stage 3, HTN, HLD, who presents with fevers and cough a/w sepsis 2/2 influenza (RVP positive) and possibly CAP and UTI. 48F hx of sarcoidosis (dx 2010 via biopsy showing noncaseating granulomas on prednisone), seizure disorder, T2DM (A1C >15.5% in 09/2018), CKD stage 3, HTN, HLD who presents with fevers and cough a/w sepsis 2/2 influenza (RVP positive) and possibly CAP and UTI.

## 2019-01-28 NOTE — H&P ADULT - PROBLEM SELECTOR PLAN 9
- c/w phenytoin 300mg daily and vimpat 150mg BID  - pt. with nausea/vomiting/diarrhea, possibly in setting of viral illness, however will check phenytoin levels as could it be an adverse effect if levels are elevated - BPs wnl  - c/w amlodipine 10mg daily, hold losartan in setting of MIKALA  - monitor vital signs [id number] - BPs wnl  - c/w amlodipine 10mg daily with higher hold parameter (SBP <120) in setting of sepsis, hold losartan in setting of MIKALA  - monitor vital signs [id number] Essential hypertension  - BPs wnl  - c/w amlodipine 10mg daily with higher hold parameter (SBP <120) in setting of sepsis, hold losartan in setting of MIKALA  - monitor vital signs [id number]

## 2019-01-28 NOTE — H&P ADULT - PROBLEM SELECTOR PLAN 8
- BPs wnl  - c/w amlodipine 10mg daily, hold losartan in setting of MIKALA  - monitor vital signs [id number] - uncontrolled, A1C >15.5 (09/2018), in setting of steroid use recheck level in AM  - hyperglycemic on chem panel, anion gap elevated to 20, however pH on VBG wnl, not concerned for DKA  - at home: Basaglar 35 units PM, and Humalog 20 units TID   - will decrease home regimen in setting of poor PO intake start with Lantus 24 units at bedtime and humalog 14 units TID, titrate as needed  - FS and SSI qac/hs - uncontrolled, A1C >15.5 (09/2018), in setting of steroid use recheck level in AM  - hyperglycemic on chem panel, anion gap elevated to 20, however pH on VBG wnl, not concerned for DKA  - at home: Basaglar 35 units PM, and Humalog 20 units TID   - will decrease home regimen in setting of poor PO intake/however on steroids  start with Lantus 30 units at bedtime and humalog 16 units TID, titrate as needed  - FS and SSI qac/hs - uncontrolled, A1C >15.5 (09/2018), in setting of steroid use recheck level in AM  - hyperglycemic on chem panel, anion gap elevated to 20, however pH on VBG wnl, not concerned for DKA  - at home: Basaglar 35 units PM, and Humalog 20 units TID   - will decrease home regimen in setting of poor PO intake/however on steroids  start with Lantus 28 units at bedtime and humalog 16 units TID, titrate as needed  - FS and SSI qac/hs - uncontrolled, A1C >15.5 (09/2018), in setting of steroid use recheck level in AM  - hyperglycemic on chem panel, anion gap elevated to 20, however pH on VBG wnl, not concerned for DKA  - at home: Basaglar 35 units PM, and Humalog 20 units TID, reports not taking for past 3 weeks in light of poor PO intake, will d/c for now  - received lantus 28, check FS now to ensure no precipitous drop in glucose then will resume checking QAC dosing.  Standing basal insulin discontinued for now, monitor FS and redose for glucose control pending need for SS coverage, titrate as needed  - FS and SSI qac/hs - uncontrolled, A1C >15.5 (09/2018), in setting of steroid use recheck level in AM  - hyperglycemic on chem panel, anion gap elevated to 20, however pH on VBG wnl, not concerned for DKA  - at home: Basaglar 35 units PM, and Humalog 20 units TID, reports not taking for past 3 weeks in light of poor PO intake, will d/c for now  - received lantus 28, Standing basal insulin discontinued for now, monitor FS and redose for glucose control pending need for SS coverage, titrate as needed  - FS and SSI qac/hs

## 2019-01-28 NOTE — H&P ADULT - PROBLEM SELECTOR PLAN 6
- dx 2010 via biopsy showing noncaseating granulomas on prednisone  - s/p solu-medrol 125mg IV in ED   - however, pt. not hypotensive prior to solu-medrol, and currrently maintaining blood pressures, thus will continue with home dose of prednisone 15mg daily starting tomorrow morning, if becomes hypotensive resume stress dose steroids - dx 2010 via biopsy showing noncaseating granulomas on prednisone  - s/p solu-medrol 125mg IV in ED   - however, pt. not hypotensive prior to solu-medrol, and currrently maintaining blood pressures, thus will continue with home dose of prednisone 15mg daily starting tomorrow morning, if becomes hypotensive resume stress dose steroids  - GI stress ulcer prophylaxis with pantoprazole 40mg daily - dx 2010 via biopsy showing noncaseating granulomas on prednisone  - s/p solu-medrol 125mg IV in ED   - however, pt. not hypotensive prior to solu-medrol, and currently maintaining blood pressures, thus will continue with home dose of prednisone 15mg daily starting tomorrow morning, if becomes hypotensive resume stress dose steroids  - GI stress ulcer prophylaxis with pantoprazole 40mg daily

## 2019-01-28 NOTE — H&P ADULT - PROBLEM SELECTOR PLAN 2
- p/w fever and productive cough, RVP positive for influenza A, CXR with bilateral patchy opacities R>L  - pt. has hx of sarcoidosis, will get CT chest non-con to better delineate lung parenchyma and PNA   - f/u sputum cultures and pro-calcitonin, and LDH (pt. immunosuppressed on steroids, may be at risk for PCP)  - renally dose Tamiflu 30mg BID x 5 days, c/w CTX and azithromycin for CAP coverage, if pt. decompensates add vancomycin for possible post obstructive PNA  - droplet precautions, tylenol PRN fever, c/w IVF

## 2019-01-28 NOTE — ED PROVIDER NOTE - MEDICAL DECISION MAKING DETAILS
48F presents with fever, rhinorrhea, body aches, vomiting and diarrhea.  CXR, labs, IVFs.  Reassess. 48F presents with fever, rhinorrhea, body aches, vomiting and diarrhea.  CXR, labs, IVFs.  Reassess.  Kathy: 2 weeks of intermittent fevers, sarcoid on steroids Concern so immunocompromised. Will look for infection. hydrate as pt looks dry. vomitting and diarrhea contributing.

## 2019-01-28 NOTE — H&P ADULT - FAMILY HISTORY
Mother  Still living? Unknown  Family history of diabetes mellitus in first degree relative, Age at diagnosis: Age Unknown

## 2019-01-28 NOTE — ED ADULT NURSE NOTE - OBJECTIVE STATEMENT
(facilitator) Pt presents to ED R#4 c/o subjective fevers x3 weeks, with productive cough (green-yellow suputum), nausea, NBNB vomiting, diarrhea and generalized weakness x4 days. Has multiple sick contacts at home, denies recent travel. Denies dysuria/ CP/ SOB/ dizziness/ other acute complaints at this time. CODE SEPSIS protocol in place. Tachycardic, placed on continuous cardiac monitoring, otherwise VSS. Pt is in NAD, respirations even and unlabored. Family at bedside.

## 2019-01-28 NOTE — H&P ADULT - PROBLEM SELECTOR PLAN 7
- uncontrolled, A1C >15.5 (09/2018), in setting of steroid use recheck level in AM  - hyperglycemic on chem panel, anion gap elevated to 20, however pH on VBG wnl, not concerned for DKA  - at home: Basaglar 35 units PM, and Humalog 20 units TID   - will decrease home regimen in setting of poor PO intake start with Lantus 24 units at bedtime and humalog 14 units TID, titrate as needed  - FS and SSI qac/hs - c/w phenytoin 300mg daily and vimpat 150mg BID  - pt. with nausea/vomiting/diarrhea, possibly in setting of viral illness, however will check phenytoin levels as could it be an adverse effect if levels are elevated

## 2019-01-28 NOTE — H&P ADULT - PROBLEM SELECTOR PLAN 5
- anion gap of 20   - likely multifactorial from lactic acidosis in setting of sepsis (lactic acid level of 3.2) with component of starvation ketoacidosis (small ketones in UA)  - although UA + ketones, non-acidotic pH on VBG, chem panel glucose 280, low suspicion for DKA  - repeat VBG, c/w IVF

## 2019-01-28 NOTE — ED ADULT NURSE NOTE - NSIMPLEMENTINTERV_GEN_ALL_ED
Implemented All Universal Safety Interventions:  [place] to call system. Call bell, personal items and telephone within reach. Instruct patient to call for assistance. Room bathroom lighting operational. Non-slip footwear when patient is off stretcher. Physically safe environment: no spills, clutter or unnecessary equipment. Stretcher in lowest position, wheels locked, appropriate side rails in place.

## 2019-01-28 NOTE — H&P ADULT - PROBLEM SELECTOR PLAN 3
- pt. c/o dysuria, UA with blood and bacteria, however negative for leukocyte esterase and nitrites   - urine cultures pending, covering for UTI with CTX as pt. on it for CAP as above

## 2019-01-28 NOTE — H&P ADULT - NSHPREVIEWOFSYSTEMS_GEN_ALL_CORE
REVIEW OF SYSTEMS:  CONSTITUTIONAL: +weakness, +fevers or chills  EYES/ENT: No visual changes;  No vertigo or throat pain   NECK: No pain or stiffness  RESPIRATORY: +cough, wheezing, hemoptysis; No shortness of breath  CARDIOVASCULAR: No chest pain or palpitations  GASTROINTESTINAL: +abdominal pain, n/v/d. No melena or hematochezia.  GENITOURINARY: +dysuria, no increased frequency or hematuria  NEUROLOGICAL: No numbness or weakness  SKIN: No itching, rashes REVIEW OF SYSTEMS:  CONSTITUTIONAL: +weakness, +fevers or chills; poor PO intake  EYES/ENT: No visual changes;  No vertigo or (+)sore throat   NECK: No pain or stiffness  RESPIRATORY: +cough, wheezing, No hemoptysis; No shortness of breath  CARDIOVASCULAR: No chest pain or palpitations  GASTROINTESTINAL: +RLQ abdominal pain, n/v/d. No melena or hematochezia.  GENITOURINARY: +dysuria, no increased frequency or hematuria  NEUROLOGICAL: No numbness or weakness; history of seizures, last seizure in December - staring spells (in setting of missed doses of antiepileptics)  SKIN: No itching, rashes

## 2019-01-29 LAB
ALBUMIN SERPL ELPH-MCNC: 3.2 G/DL — LOW (ref 3.3–5)
ALP SERPL-CCNC: 40 U/L — SIGNIFICANT CHANGE UP (ref 40–120)
ALT FLD-CCNC: 5 U/L — SIGNIFICANT CHANGE UP (ref 4–33)
ANION GAP SERPL CALC-SCNC: 14 MMO/L — SIGNIFICANT CHANGE UP (ref 7–14)
ANION GAP SERPL CALC-SCNC: 20 MMO/L — HIGH (ref 7–14)
AST SERPL-CCNC: 10 U/L — SIGNIFICANT CHANGE UP (ref 4–32)
BASOPHILS # BLD AUTO: 0 K/UL — SIGNIFICANT CHANGE UP (ref 0–0.2)
BASOPHILS NFR BLD AUTO: 0 % — SIGNIFICANT CHANGE UP (ref 0–2)
BILIRUB SERPL-MCNC: 0.3 MG/DL — SIGNIFICANT CHANGE UP (ref 0.2–1.2)
BUN SERPL-MCNC: 32 MG/DL — HIGH (ref 7–23)
BUN SERPL-MCNC: 36 MG/DL — HIGH (ref 7–23)
CALCIUM SERPL-MCNC: 8.1 MG/DL — LOW (ref 8.4–10.5)
CALCIUM SERPL-MCNC: 8.3 MG/DL — LOW (ref 8.4–10.5)
CHLORIDE SERPL-SCNC: 102 MMOL/L — SIGNIFICANT CHANGE UP (ref 98–107)
CHLORIDE SERPL-SCNC: 102 MMOL/L — SIGNIFICANT CHANGE UP (ref 98–107)
CO2 SERPL-SCNC: 17 MMOL/L — LOW (ref 22–31)
CO2 SERPL-SCNC: 20 MMOL/L — LOW (ref 22–31)
CREAT SERPL-MCNC: 1.23 MG/DL — SIGNIFICANT CHANGE UP (ref 0.5–1.3)
CREAT SERPL-MCNC: 1.34 MG/DL — HIGH (ref 0.5–1.3)
EOSINOPHIL # BLD AUTO: 0 K/UL — SIGNIFICANT CHANGE UP (ref 0–0.5)
EOSINOPHIL NFR BLD AUTO: 0 % — SIGNIFICANT CHANGE UP (ref 0–6)
GLUCOSE BLDC GLUCOMTR-MCNC: 277 MG/DL — HIGH (ref 70–99)
GLUCOSE BLDC GLUCOMTR-MCNC: 344 MG/DL — HIGH (ref 70–99)
GLUCOSE BLDC GLUCOMTR-MCNC: 395 MG/DL — HIGH (ref 70–99)
GLUCOSE SERPL-MCNC: 370 MG/DL — HIGH (ref 70–99)
GLUCOSE SERPL-MCNC: 392 MG/DL — HIGH (ref 70–99)
HBA1C BLD-MCNC: 14 % — HIGH (ref 4–5.6)
HCT VFR BLD CALC: 32.4 % — LOW (ref 34.5–45)
HGB BLD-MCNC: 10.3 G/DL — LOW (ref 11.5–15.5)
HIV 1+2 AB+HIV1 P24 AG SERPL QL IA: SIGNIFICANT CHANGE UP
IMM GRANULOCYTES NFR BLD AUTO: 0.5 % — SIGNIFICANT CHANGE UP (ref 0–1.5)
LDH SERPL L TO P-CCNC: 193 U/L — SIGNIFICANT CHANGE UP (ref 135–225)
LYMPHOCYTES # BLD AUTO: 0.51 K/UL — LOW (ref 1–3.3)
LYMPHOCYTES # BLD AUTO: 12.1 % — LOW (ref 13–44)
MAGNESIUM SERPL-MCNC: 1.8 MG/DL — SIGNIFICANT CHANGE UP (ref 1.6–2.6)
MCHC RBC-ENTMCNC: 26.8 PG — LOW (ref 27–34)
MCHC RBC-ENTMCNC: 31.8 % — LOW (ref 32–36)
MCV RBC AUTO: 84.4 FL — SIGNIFICANT CHANGE UP (ref 80–100)
MONOCYTES # BLD AUTO: 0.12 K/UL — SIGNIFICANT CHANGE UP (ref 0–0.9)
MONOCYTES NFR BLD AUTO: 2.8 % — SIGNIFICANT CHANGE UP (ref 2–14)
NEUTROPHILS # BLD AUTO: 3.57 K/UL — SIGNIFICANT CHANGE UP (ref 1.8–7.4)
NEUTROPHILS NFR BLD AUTO: 84.6 % — HIGH (ref 43–77)
NRBC # FLD: 0 K/UL — LOW (ref 25–125)
PHENYTOIN FREE SERPL-MCNC: < 0.6 UG/ML — LOW (ref 10–20)
PHOSPHATE SERPL-MCNC: 4.1 MG/DL — SIGNIFICANT CHANGE UP (ref 2.5–4.5)
PLATELET # BLD AUTO: 249 K/UL — SIGNIFICANT CHANGE UP (ref 150–400)
PMV BLD: 9.8 FL — SIGNIFICANT CHANGE UP (ref 7–13)
POTASSIUM SERPL-MCNC: 4.9 MMOL/L — SIGNIFICANT CHANGE UP (ref 3.5–5.3)
POTASSIUM SERPL-MCNC: 4.9 MMOL/L — SIGNIFICANT CHANGE UP (ref 3.5–5.3)
POTASSIUM SERPL-SCNC: 4.9 MMOL/L — SIGNIFICANT CHANGE UP (ref 3.5–5.3)
POTASSIUM SERPL-SCNC: 4.9 MMOL/L — SIGNIFICANT CHANGE UP (ref 3.5–5.3)
PROCALCITONIN SERPL-MCNC: 2.82 NG/ML — HIGH (ref 0.02–0.1)
PROT SERPL-MCNC: 7.5 G/DL — SIGNIFICANT CHANGE UP (ref 6–8.3)
RBC # BLD: 3.84 M/UL — SIGNIFICANT CHANGE UP (ref 3.8–5.2)
RBC # FLD: 11.7 % — SIGNIFICANT CHANGE UP (ref 10.3–14.5)
SODIUM SERPL-SCNC: 136 MMOL/L — SIGNIFICANT CHANGE UP (ref 135–145)
SODIUM SERPL-SCNC: 139 MMOL/L — SIGNIFICANT CHANGE UP (ref 135–145)
SPECIMEN SOURCE: SIGNIFICANT CHANGE UP
TROPONIN T, HIGH SENSITIVITY: < 6 NG/L — SIGNIFICANT CHANGE UP (ref ?–14)
TROPONIN T, HIGH SENSITIVITY: < 6 NG/L — SIGNIFICANT CHANGE UP (ref ?–14)
WBC # BLD: 4.22 K/UL — SIGNIFICANT CHANGE UP (ref 3.8–10.5)
WBC # FLD AUTO: 4.22 K/UL — SIGNIFICANT CHANGE UP (ref 3.8–10.5)

## 2019-01-29 PROCEDURE — 12345: CPT | Mod: NC

## 2019-01-29 PROCEDURE — 99255 IP/OBS CONSLTJ NEW/EST HI 80: CPT | Mod: GC

## 2019-01-29 PROCEDURE — 99223 1ST HOSP IP/OBS HIGH 75: CPT | Mod: GC

## 2019-01-29 PROCEDURE — 93010 ELECTROCARDIOGRAM REPORT: CPT

## 2019-01-29 RX ORDER — INSULIN LISPRO 100/ML
15 VIAL (ML) SUBCUTANEOUS
Qty: 0 | Refills: 0 | Status: DISCONTINUED | OUTPATIENT
Start: 2019-01-29 | End: 2019-01-30

## 2019-01-29 RX ORDER — SODIUM CHLORIDE 9 MG/ML
1000 INJECTION INTRAMUSCULAR; INTRAVENOUS; SUBCUTANEOUS
Qty: 0 | Refills: 0 | Status: DISCONTINUED | OUTPATIENT
Start: 2019-01-29 | End: 2019-01-30

## 2019-01-29 RX ORDER — INSULIN LISPRO 100/ML
12 VIAL (ML) SUBCUTANEOUS
Qty: 0 | Refills: 0 | Status: DISCONTINUED | OUTPATIENT
Start: 2019-01-29 | End: 2019-01-29

## 2019-01-29 RX ORDER — INSULIN GLARGINE 100 [IU]/ML
35 INJECTION, SOLUTION SUBCUTANEOUS AT BEDTIME
Qty: 0 | Refills: 0 | Status: DISCONTINUED | OUTPATIENT
Start: 2019-01-29 | End: 2019-01-31

## 2019-01-29 RX ORDER — INSULIN GLARGINE 100 [IU]/ML
38 INJECTION, SOLUTION SUBCUTANEOUS AT BEDTIME
Qty: 0 | Refills: 0 | Status: DISCONTINUED | OUTPATIENT
Start: 2019-01-29 | End: 2019-01-29

## 2019-01-29 RX ORDER — INSULIN GLARGINE 100 [IU]/ML
35 INJECTION, SOLUTION SUBCUTANEOUS AT BEDTIME
Qty: 0 | Refills: 0 | Status: DISCONTINUED | OUTPATIENT
Start: 2019-01-29 | End: 2019-01-29

## 2019-01-29 RX ORDER — ONDANSETRON 8 MG/1
4 TABLET, FILM COATED ORAL ONCE
Qty: 0 | Refills: 0 | Status: DISCONTINUED | OUTPATIENT
Start: 2019-01-29 | End: 2019-01-30

## 2019-01-29 RX ADMIN — Medication 3: at 00:06

## 2019-01-29 RX ADMIN — INSULIN GLARGINE 28 UNIT(S): 100 INJECTION, SOLUTION SUBCUTANEOUS at 00:06

## 2019-01-29 RX ADMIN — ATORVASTATIN CALCIUM 40 MILLIGRAM(S): 80 TABLET, FILM COATED ORAL at 21:32

## 2019-01-29 RX ADMIN — Medication 30 MILLIGRAM(S): at 17:09

## 2019-01-29 RX ADMIN — Medication 30 MILLIGRAM(S): at 01:06

## 2019-01-29 RX ADMIN — INSULIN GLARGINE 35 UNIT(S): 100 INJECTION, SOLUTION SUBCUTANEOUS at 22:06

## 2019-01-29 RX ADMIN — Medication 4: at 17:23

## 2019-01-29 RX ADMIN — AZITHROMYCIN 250 MILLIGRAM(S): 500 TABLET, FILM COATED ORAL at 16:33

## 2019-01-29 RX ADMIN — LACOSAMIDE 150 MILLIGRAM(S): 50 TABLET ORAL at 05:00

## 2019-01-29 RX ADMIN — SODIUM CHLORIDE 100 MILLILITER(S): 9 INJECTION INTRAMUSCULAR; INTRAVENOUS; SUBCUTANEOUS at 16:33

## 2019-01-29 RX ADMIN — CEFTRIAXONE 100 GRAM(S): 500 INJECTION, POWDER, FOR SOLUTION INTRAMUSCULAR; INTRAVENOUS at 16:32

## 2019-01-29 RX ADMIN — Medication 5: at 07:54

## 2019-01-29 RX ADMIN — Medication 1: at 21:32

## 2019-01-29 RX ADMIN — AMLODIPINE BESYLATE 10 MILLIGRAM(S): 2.5 TABLET ORAL at 00:09

## 2019-01-29 RX ADMIN — Medication 5: at 12:50

## 2019-01-29 RX ADMIN — LACOSAMIDE 150 MILLIGRAM(S): 50 TABLET ORAL at 17:14

## 2019-01-29 RX ADMIN — Medication 30 MILLIGRAM(S): at 08:56

## 2019-01-29 RX ADMIN — ESCITALOPRAM OXALATE 10 MILLIGRAM(S): 10 TABLET, FILM COATED ORAL at 17:09

## 2019-01-29 RX ADMIN — Medication 300 MILLIGRAM(S): at 12:51

## 2019-01-29 RX ADMIN — ATORVASTATIN CALCIUM 40 MILLIGRAM(S): 80 TABLET, FILM COATED ORAL at 00:11

## 2019-01-29 RX ADMIN — PANTOPRAZOLE SODIUM 40 MILLIGRAM(S): 20 TABLET, DELAYED RELEASE ORAL at 05:01

## 2019-01-29 RX ADMIN — Medication 12 UNIT(S): at 17:23

## 2019-01-29 RX ADMIN — ESCITALOPRAM OXALATE 10 MILLIGRAM(S): 10 TABLET, FILM COATED ORAL at 07:46

## 2019-01-29 RX ADMIN — Medication 15 MILLIGRAM(S): at 05:00

## 2019-01-29 NOTE — PHYSICAL THERAPY INITIAL EVALUATION ADULT - PERTINENT HX OF CURRENT PROBLEM, REHAB EVAL
This is a 48F hx of sarcoidosis, seizure disorder, CKD stage 3,  who presents with fevers and cough a/w sepsis 2/2 influenza (RVP positive) and possibly CAP and UTI.

## 2019-01-29 NOTE — PHYSICAL THERAPY INITIAL EVALUATION ADULT - DISCHARGE DISPOSITION, PT EVAL
Home PT due to impaired strength, balance,endurance , decreased functional mobility, to negotiate 1 flight of stairs, use of new assistive device  and to safely navigate in the home environment/home w/ home PT

## 2019-01-29 NOTE — PROGRESS NOTE ADULT - PROBLEM SELECTOR PLAN 5
- SCr 1.7 (baseline ~1.4), CKD stage 3 - likely pre-renal in setting of sepsis, poor PO intake, and GI losses (n/v/d), s/p 2L bolus in ED, c/w IVF   - trend SCr, renally dose medications, avoid nephrotoxic medications

## 2019-01-29 NOTE — CONSULT NOTE ADULT - SUBJECTIVE AND OBJECTIVE BOX
HPI:  48F hx of sarcoidosis (dx 2010 via biopsy showing noncaseating granulomas on prednisone), seizure disorder, T2DM (A1C >15.5% in 09/2018), CKD stage 3, HTN, HLD, who presents with fevers and cough. Over the past 3 weeks pt. has not been feeling well, with subjective fevers at home as well as productive cough with yellow sputum. Of note, pt. had multiple family members at home with the flu, however they recovered quickly while pt. continued to get worse. She has been unable to tolerate PO intake, and began to have nausea/vomiting/diarrhea a few days ago associated with diffuse, crampy abdominal pain. She denies any blood in vomitus or BMs. She took tylenol one day and nyquil last night, but denies antibiotic use or recent hospitalizations. In addition, this morning she had dysuria.  She denies headache, neck stiffness, chest pain, shortness of breath, rashes, joint pains, recent travel.     In the ED - Tm 103.4, , /81, RR 22, 100% on RA  Labs notable for SCr. 1.7, anion gap 20, lactate 3.2, UA with moderate blood, protein, and bacteria, RVP +Influenza  CXR with bibasilar reticular interstitial opacities right greater than left   s/p ceftriaxone, azithromycin, 2L NS bolus, tyelnol, solu-medrol 125mg IV x1 (28 Jan 2019 20:33)      Interval History:   Today pt says she still feels run down, but reports that nausea and chest tightness are better.  Patient says that she was first diagnosed with diabetes "over 10 years ago", and that her home diabetes meds are Lantus 35 and Humalog 20 three times with meals.  Patient endorses checking her fingersticks at home, and reports that at home, when she's well, she fingersticks run high (300s, sometimes 400). Patient reports that "when I give myself insulin and I check my sugar and it's 215 or lower, I feel lightheaded and I have to have some orange juice. My doctor said it's because my body is used to the high sugars." However, patient reports that she has not taken any insulin nor has she checked her fingersticks at home for about 3 weeks prior to admission, because during this time she was feeling so unwell and nauseous that she was unable to eat solid foods.   Currently says that her nausea has improved and she has had no further episodes of emesis. Is tolerating regular diet. Says that she isn't eating much because food doesn't taste good to her and she has a sore throat.    Describes her typical diet (when she is well) as cereal for breakfast, toast or crackers (saltines) with a cup of coffee around 10-11am, and for lunch and dinner meals typically consist of rice with vegetables & fish or outside food / takeout. Endorses eating bread, rice, and pasta regularly. Reports that she avoids fruits due to their sugar content, but says she eats vegetables often.    She says that she does have an endocrinologist but she hasn't been able to see her for the last 2-3 months because of problems with her insurance.    Endorses polydipsia. Reports polyuria when she's well, but says for the last few weeks she has hardly been urinating at all. Endorses burning on soles of feet. Does not check her feet regularly for cuts, scrapes, ingrown nails, etc.  Endorses fevers, chills, malaise, body aches. Endorses unintentional weight loss, which she attributes to poor PO intake for 3 weeks.  Denies chest pain, palpitation, SOB, abd pain, vomiting, diarrhea, leg pain, numbness, weakness.      Reason for Consult: hyperglycemia in setting of chronic steroid use      PAST MEDICAL & SURGICAL HISTORY:  Seizure disorder  Chronic kidney disease (CKD), stage III (moderate)  Hyperlipidemia  Hypertension  Diabetes  Sarcoidosis  No significant past surgical history      FAMILY HISTORY:  Family history of diabetes mellitus in first degree relative (Mother)      Social History: denies tobacco, alcohol, illicit drug use    Outpatient Medications:  · 	docusate sodium 100 mg oral capsule: 1 cap(s) orally 2 times a day, Last Dose Taken:    · 	HumaLOG KwikPen 100 units/mL injectable solution: 20 unit(s) injectable 3 times a day (before meals), Last Dose Taken:    · 	pantoprazole 40 mg oral delayed release tablet: 1 tab(s) orally once a day (before a meal), Last Dose Taken:    · 	Vimpat 150 mg oral tablet: 1 tab(s) orally 2 times a day, Last Dose Taken:    · 	amLODIPine 10 mg oral tablet: 1 tab(s) orally once a day, Last Dose Taken:    · 	atorvastatin 40 mg oral tablet: 1 tab(s) orally once a day, Last Dose Taken:    · 	Basaglar KwikPen 100 units/mL subcutaneous solution: 35 unit(s) subcutaneous once a day (at bedtime), Last Dose Taken:    · 	phenytoin 100 mg oral capsule: 3 cap(s) (300mg) orally once a day, Last Dose Taken:    · 	predniSONE 10 mg oral tablet: 1 tab(s) orally once a day, taken with 5mg tablet for total daily dose of 15mg, Last Dose Taken:    · 	predniSONE 5 mg oral tablet: 1 tab(s) orally once a day, taken with 10mg tablets for total daily dose of 15mg, Last Dose Taken:    · 	losartan 25 mg oral tablet: 1 tab(s) orally once a day, Last Dose Taken:    · 	escitalopram 10 mg oral tablet: 1 tab(s) orally 2 times a day, Last Dose Taken:      MEDICATIONS  (STANDING):  atorvastatin 40 milliGRAM(s) Oral at bedtime  azithromycin  IVPB 500 milliGRAM(s) IV Intermittent every 24 hours  cefTRIAXone   IVPB 1 Gram(s) IV Intermittent every 24 hours  dextrose 50% Injectable 25 Gram(s) IV Push once  escitalopram 10 milliGRAM(s) Oral <User Schedule>  insulin glargine Injectable (LANTUS) 38 Unit(s) SubCutaneous at bedtime  insulin lispro (HumaLOG) corrective regimen sliding scale   SubCutaneous three times a day before meals  insulin lispro (HumaLOG) corrective regimen sliding scale   SubCutaneous at bedtime  insulin lispro Injectable (HumaLOG) 12 Unit(s) SubCutaneous three times a day before meals  lacosamide 150 milliGRAM(s) Oral two times a day  oseltamivir 30 milliGRAM(s) Oral two times a day  pantoprazole    Tablet 40 milliGRAM(s) Oral before breakfast  phenytoin   Capsule 300 milliGRAM(s) Oral daily  predniSONE   Tablet 15 milliGRAM(s) Oral daily  sodium chloride 0.9%. 1000 milliLiter(s) (100 mL/Hr) IV Continuous <Continuous>    MEDICATIONS  (PRN):  acetaminophen   Tablet .. 650 milliGRAM(s) Oral every 6 hours PRN Temp greater or equal to 38C (100.4F), Mild Pain (1 - 3), Moderate Pain (4 - 6)  dextrose 40% Gel 15 Gram(s) Oral once PRN Blood Glucose LESS THAN 70 milliGRAM(s)/deciliter      Allergies  No Known Allergies      Review of Systems:  Constitutional: Endorses subjective fevers at home, see above  Eyes: No acute visual disturbances  Neuro: No tremors  HEENT: No pain  Cardiovascular: No chest pain, palpitations  Respiratory: No SOB  GI: No nausea, vomiting, abdominal pain, see above  : No dysuria  Skin: no rash  Psych: no depression  Endocrine: see above  Hem/lymph: no swelling      PHYSICAL EXAM:  VITALS: T(C): 36.3 (01-29-19 @ 12:41)  T(F): 97.4 (01-29-19 @ 12:41), Max: 102.1 (01-28-19 @ 16:28)  HR: 71 (01-29-19 @ 12:41) (71 - 106)  BP: 114/74 (01-29-19 @ 12:41) (101/71 - 138/85)  RR:  (18 - 23)  SpO2:  (100% - 100%)  Wt(kg): --  GENERAL: NAD  EYES: No proptosis, no lid lag, anicteric  HEENT:  Atraumatic, Normocephalic, moist mucous membranes  THYROID: Normal size, no palpable nodules  RESPIRATORY: Clear to auscultation anteriorly bilaterally  CARDIOVASCULAR: Regular rate and rhythm; No murmurs; no peripheral edema  GI: Soft, nontender, non distended, normal bowel sounds  SKIN: Dry, intact, No rashes or lesions  MUSCULOSKELETAL: Full range of motion, normal strength  NEURO: sensation intact, extraocular movements intact, no tremor  PSYCH: Alert and oriented x 3, normal affect, normal mood  CUSHING'S SIGNS: no striae      CAPILLARY BLOOD GLUCOSE      POCT Blood Glucose.: 395 mg/dL (29 Jan 2019 12:04)  POCT Blood Glucose.: 397 mg/dL (29 Jan 2019 07:47)  POCT Blood Glucose.: 412 mg/dL (29 Jan 2019 07:45)  POCT Blood Glucose.: 372 mg/dL (29 Jan 2019 03:45)  POCT Blood Glucose.: 399 mg/dL (28 Jan 2019 23:32)                            10.3   4.22  )-----------( 249      ( 29 Jan 2019 06:30 )             32.4       01-29    139  |  102  |  32<H>  ----------------------------<  392<H>  4.9   |  17<L>  |  1.34<H>    EGFR if : 55  EGFR if non : 47    Ca    8.1<L>      01-29  Mg     1.8     01-29  Phos  4.1     01-29    TPro  7.5  /  Alb  3.2<L>  /  TBili  0.3  /  DBili  x   /  AST  10  /  ALT  5   /  AlkPhos  40  01-29        Hemoglobin A1C, Whole Blood: 14.0 % <H> [4.0 - 5.6] (01-29-19 @ 06:30)      Radiology:       EXAM:  XR CHEST PA LAT 2V      PROCEDURE DATE:  Jan 28 2019     INTERPRETATION:  CLINICAL INDICATION: fever; sarcoidosis    EXAM:  Frontal and lateral chest from 1/28/2019 at 1504. Compared to prior study   from 10/20/2017.    IMPRESSION:  Redemonstrated bibasilar reticular interstitial opacities right greater   than left reflecting scarring/bronchiectasis, however component of   superimposed infection cannot  be excluded.     Grossly clear upper lungs. No pleural effusions or pneumothorax.     Cardiac and mediastinal silhouettes within normal limits.    Trachea midline.     Unremarkable osseous structures.

## 2019-01-29 NOTE — PROGRESS NOTE ADULT - PROBLEM SELECTOR PLAN 7
- dx 2010 via biopsy showing noncaseating granulomas on prednisone  - s/p solu-medrol 125mg IV in ED   - however, pt. not hypotensive prior to solu-medrol, and currently maintaining blood pressures, thus will continue with home dose of prednisone 15mg daily starting tomorrow morning, if becomes hypotensive resume stress dose steroids  - GI stress ulcer prophylaxis with pantoprazole 40mg daily

## 2019-01-29 NOTE — PROGRESS NOTE ADULT - ASSESSMENT
48F hx of sarcoidosis (dx 2010 via biopsy showing noncaseating granulomas on prednisone), seizure disorder, uncontrolled T2DM (on insulin since 2017, A1C >15.5% in 09/2018), CKD stage 3, HTN, HLD who presents with fevers and cough a/w sepsis 2/2 influenza (RVP positive), CAP and possible UTI.

## 2019-01-29 NOTE — PROGRESS NOTE ADULT - SUBJECTIVE AND OBJECTIVE BOX
Patient is a 48y old  Female who presents with a chief complaint of influenza A, PNA (2019 20:33)    SUBJECTIVE / OVERNIGHT EVENTS:  C/o feeling very weak.  Family been sick as well.  Fever, cough, nausea, vomiting, diarrhea, sugars been very elevated, not been taking her insulin.    MEDICATIONS  (STANDING):  atorvastatin 40 milliGRAM(s) Oral at bedtime  azithromycin  IVPB 500 milliGRAM(s) IV Intermittent every 24 hours  cefTRIAXone   IVPB 1 Gram(s) IV Intermittent every 24 hours  dextrose 50% Injectable 25 Gram(s) IV Push once  escitalopram 10 milliGRAM(s) Oral <User Schedule>  insulin glargine Injectable (LANTUS) 38 Unit(s) SubCutaneous at bedtime  insulin lispro (HumaLOG) corrective regimen sliding scale   SubCutaneous three times a day before meals  insulin lispro (HumaLOG) corrective regimen sliding scale   SubCutaneous at bedtime  insulin lispro Injectable (HumaLOG) 12 Unit(s) SubCutaneous three times a day before meals  lacosamide 150 milliGRAM(s) Oral two times a day  oseltamivir 30 milliGRAM(s) Oral two times a day  pantoprazole    Tablet 40 milliGRAM(s) Oral before breakfast  phenytoin   Capsule 300 milliGRAM(s) Oral daily  predniSONE   Tablet 15 milliGRAM(s) Oral daily  sodium chloride 0.9%. 1000 milliLiter(s) (100 mL/Hr) IV Continuous <Continuous>    MEDICATIONS  (PRN):  acetaminophen   Tablet .. 650 milliGRAM(s) Oral every 6 hours PRN Temp greater or equal to 38C (100.4F), Mild Pain (1 - 3), Moderate Pain (4 - 6)  dextrose 40% Gel 15 Gram(s) Oral once PRN Blood Glucose LESS THAN 70 milliGRAM(s)/deciliter    CAPILLARY BLOOD GLUCOSE  POCT Blood Glucose.: 395 mg/dL (2019 12:04)  POCT Blood Glucose.: 397 mg/dL (2019 07:47)  POCT Blood Glucose.: 412 mg/dL (2019 07:45)  POCT Blood Glucose.: 372 mg/dL (2019 03:45)  POCT Blood Glucose.: 399 mg/dL (2019 23:32)    Vital Signs Last 24 Hrs  T(C): 36.3 (2019 12:41), Max: 39.7 (2019 15:59)  T(F): 97.4 (2019 12:41), Max: 103.4 (2019 15:59)  HR: 71 (2019 12:41) (71 - 117)  BP: 114/74 (2019 12:41) (101/71 - 138/85)  RR: 18 (2019 12:41) (18 - 23)  SpO2: 100% (2019 12:41) (98% - 100%)    PHYSICAL EXAM:  GENERAL: middle aged female, looks tired  HEAD:  Atraumatic, Normocephalic  EYES: EOMI, PERRLA, conjunctiva and sclera clear  NECK: Supple, No JVD  CHEST/LUNG: mild R base rhonchi  HEART: Regular rate and rhythm; No murmurs, rubs, or gallops  ABDOMEN: Soft, Nontender, Nondistended; Bowel sounds present  EXTREMITIES:  2+ Peripheral Pulses, No clubbing, cyanosis, or edema  PSYCH: AAOx3  NEUROLOGY: non-focal  SKIN: No rashes or lesions    LABS:                        10.3   4.22  )-----------( 249      ( 2019 06:30 )             32.4         139  |  102  |  32<H>  ----------------------------<  392<H>  4.9   |  17<L>  |  1.34<H>    Ca    8.1<L>      2019 06:30  Phos  4.1       Mg     1.8         TPro  7.5  /  Alb  3.2<L>  /  TBili  0.3  /  DBili  x   /  AST  10  /  ALT  5   /  AlkPhos  40      Urinalysis Basic - ( 2019 13:15 )  Color: YELLOW / Appearance: Lt TURBID / S.024 / pH: 6.0  Gluc: 1000 / Ketone: SMALL  / Bili: NEGATIVE / Urobili: NORMAL   Blood: MODERATE / Protein: >600 / Nitrite: NEGATIVE   Leuk Esterase: NEGATIVE / RBC: 3-5 / WBC 11-25   Sq Epi: MODERATE / Non Sq Epi: x / Bacteria: MANY    Ucx, blood cx NGTD    RADIOLOGY & ADDITIONAL TESTS:  < from: CT Chest No Cont (19 @ 22:09) >  Comparison is made with the prior chest CT of 2017.    There are enlarged bilateral axillary and subpectoral lymph nodes with   overall interval decrease in size since 2017. For reference   there is an enlarged right axillarylymph node measuring about 1.4 cm in   transverse dimension which previously measured about 2 cm. Subcentimeter   bilateral supraclavicular lymph nodes are also noted. There is a mildly   enlarged subcarinal lymph node measuring about 1.5 cm in anteroposterior   diameter containing internal foci of calcification as on the prior study.   Smaller mediastinal lymph nodes are also noted which are predominantly   unchanged. There is no pericardial effusion. The left atrium is prominent   measuring about 4 cm in anteroposterior dimension. Minimal right pleural   fluid is present.    Evaluation of the upper abdominal organs demonstrate the patient to be   status post cholecystectomy. The spleen is prominent as on the prior   study. There are prominent upper abdominal lymph nodes which appears to   have decreased in size since the prior study.    Evaluation of the lungs demonstrate bilateral lower lung peripheral cysts   within the lingula, right middle lobe and the lower lobes with mild   associated bronchiectasis predominantly unchanged since the prior study.   There are some associated mild bilateral peripheral reticular opacities   as on the prior study. There are superimposed bilateral patchy and   nodular opacities scattered throughout both lungs with somewhat of a   lower lung predominance new since the prior study. There are no central   endobronchial lesions.    Evaluation of the bones demonstrate no significant abnormality.    IMPRESSION: Multiple bilateral nodular and patchy opacities scattered   throughout both lungs new since 2017 are suggestive of   infection. One month follow-up chest CT is recommended to ensure   resolution.    Bilateral peripheral lower lung cysts are predominantly unchanged since   2017.    Bilateral axillary enlarged lymph nodes with overall interval improvement   and decrease in size since 2017.    Imaging Personally Reviewed:    Consultant(s) Notes Reviewed:      Care Discussed with Consultants/Other Providers: RN, SW, CM, ADS re overall care

## 2019-01-29 NOTE — PROGRESS NOTE ADULT - PROBLEM SELECTOR PLAN 1
- p/w fever and productive cough, RVP positive for influenza A, CXR with bilateral patchy opacities R>L, CT confirms new multiple bilateral opacities c/w pneumonia  - renally dose Tamiflu 30mg BID x 5 days, c/w CTX and azithromycin for CAP coverage, if pt. decompensates add vancomycin for possible post obstructive PNA  - droplet precautions, tylenol PRN fever, c/w IVF

## 2019-01-29 NOTE — CONSULT NOTE ADULT - ASSESSMENT
ASSESSMENT:  47yo F, PMHx sarcoidosis (diagnosed in 2010 via biopsy, on chronic prednisone), seizure disorder (on phenytoin and vimpat), DM2 (HgbA1C 14%), CKD stage 3, HTN, HLD, admitted for sepsis secondary to influenzae A. Endocrine consulted for hyperglycemia in the setting of chronic steroid use.      PLAN:  - Restart home dose of Lantus 35 and Humalog 20 TID      Will discuss with team      Mamie Boyer, PGY-1 ASSESSMENT:  49yo F, PMHx sarcoidosis (diagnosed in 2010 via biopsy, on chronic prednisone), seizure disorder (on phenytoin and vimpat), DM2 (HgbA1C 14%), CKD stage 3, HTN, HLD, admitted for sepsis secondary to influenzae A. Endocrine consulted for hyperglycemia in the setting of chronic steroid use.      PLAN:  - Lantus 35 and Humalog 15 TID and moderate ISS  - Continue to monitor FS TID with meals and at bedtime  - Likely some component of hyperglycemia due to single dose of solumedrol 125mg IV given in ED. Agree with d/c solumedrol & continuing only home dose prednisone 15mg qd  - Nutrition consult with RD    Discussed with team.      Mamie Boyer, PGY-1

## 2019-01-30 DIAGNOSIS — Z71.89 OTHER SPECIFIED COUNSELING: ICD-10-CM

## 2019-01-30 DIAGNOSIS — R50.81 FEVER PRESENTING WITH CONDITIONS CLASSIFIED ELSEWHERE: ICD-10-CM

## 2019-01-30 DIAGNOSIS — R05 COUGH: ICD-10-CM

## 2019-01-30 DIAGNOSIS — J18.9 PNEUMONIA, UNSPECIFIED ORGANISM: ICD-10-CM

## 2019-01-30 DIAGNOSIS — E83.39 OTHER DISORDERS OF PHOSPHORUS METABOLISM: ICD-10-CM

## 2019-01-30 LAB
ANION GAP SERPL CALC-SCNC: 12 MMO/L — SIGNIFICANT CHANGE UP (ref 7–14)
BACTERIA UR CULT: SIGNIFICANT CHANGE UP
BASOPHILS # BLD AUTO: 0.01 K/UL — SIGNIFICANT CHANGE UP (ref 0–0.2)
BASOPHILS NFR BLD AUTO: 0.1 % — SIGNIFICANT CHANGE UP (ref 0–2)
BUN SERPL-MCNC: 26 MG/DL — HIGH (ref 7–23)
CALCIUM SERPL-MCNC: 7.8 MG/DL — LOW (ref 8.4–10.5)
CHLORIDE SERPL-SCNC: 108 MMOL/L — HIGH (ref 98–107)
CO2 SERPL-SCNC: 20 MMOL/L — LOW (ref 22–31)
CREAT SERPL-MCNC: 1.08 MG/DL — SIGNIFICANT CHANGE UP (ref 0.5–1.3)
EOSINOPHIL # BLD AUTO: 0 K/UL — SIGNIFICANT CHANGE UP (ref 0–0.5)
EOSINOPHIL NFR BLD AUTO: 0 % — SIGNIFICANT CHANGE UP (ref 0–6)
GLUCOSE BLDC GLUCOMTR-MCNC: 108 MG/DL — HIGH (ref 70–99)
GLUCOSE BLDC GLUCOMTR-MCNC: 157 MG/DL — HIGH (ref 70–99)
GLUCOSE BLDC GLUCOMTR-MCNC: 205 MG/DL — HIGH (ref 70–99)
GLUCOSE BLDC GLUCOMTR-MCNC: 87 MG/DL — SIGNIFICANT CHANGE UP (ref 70–99)
GLUCOSE SERPL-MCNC: 207 MG/DL — HIGH (ref 70–99)
GRAM STN SPT: SIGNIFICANT CHANGE UP
HCT VFR BLD CALC: 30.7 % — LOW (ref 34.5–45)
HGB BLD-MCNC: 10 G/DL — LOW (ref 11.5–15.5)
IMM GRANULOCYTES NFR BLD AUTO: 1.1 % — SIGNIFICANT CHANGE UP (ref 0–1.5)
LYMPHOCYTES # BLD AUTO: 0.67 K/UL — LOW (ref 1–3.3)
LYMPHOCYTES # BLD AUTO: 9.2 % — LOW (ref 13–44)
MAGNESIUM SERPL-MCNC: 1.7 MG/DL — SIGNIFICANT CHANGE UP (ref 1.6–2.6)
MCHC RBC-ENTMCNC: 26.5 PG — LOW (ref 27–34)
MCHC RBC-ENTMCNC: 32.6 % — SIGNIFICANT CHANGE UP (ref 32–36)
MCV RBC AUTO: 81.2 FL — SIGNIFICANT CHANGE UP (ref 80–100)
METHOD TYPE: SIGNIFICANT CHANGE UP
MONOCYTES # BLD AUTO: 0.32 K/UL — SIGNIFICANT CHANGE UP (ref 0–0.9)
MONOCYTES NFR BLD AUTO: 4.4 % — SIGNIFICANT CHANGE UP (ref 2–14)
NEUTROPHILS # BLD AUTO: 6.18 K/UL — SIGNIFICANT CHANGE UP (ref 1.8–7.4)
NEUTROPHILS NFR BLD AUTO: 85.2 % — HIGH (ref 43–77)
NRBC # FLD: 0 K/UL — LOW (ref 25–125)
ORGANISM # SPEC MICROSCOPIC CNT: SIGNIFICANT CHANGE UP
ORGANISM # SPEC MICROSCOPIC CNT: SIGNIFICANT CHANGE UP
PHOSPHATE SERPL-MCNC: 1.8 MG/DL — LOW (ref 2.5–4.5)
PLATELET # BLD AUTO: 334 K/UL — SIGNIFICANT CHANGE UP (ref 150–400)
PMV BLD: 9.4 FL — SIGNIFICANT CHANGE UP (ref 7–13)
POTASSIUM SERPL-MCNC: 3.8 MMOL/L — SIGNIFICANT CHANGE UP (ref 3.5–5.3)
POTASSIUM SERPL-SCNC: 3.8 MMOL/L — SIGNIFICANT CHANGE UP (ref 3.5–5.3)
RBC # BLD: 3.78 M/UL — LOW (ref 3.8–5.2)
RBC # FLD: 11.8 % — SIGNIFICANT CHANGE UP (ref 10.3–14.5)
SODIUM SERPL-SCNC: 140 MMOL/L — SIGNIFICANT CHANGE UP (ref 135–145)
SPECIMEN SOURCE: SIGNIFICANT CHANGE UP
WBC # BLD: 7.26 K/UL — SIGNIFICANT CHANGE UP (ref 3.8–10.5)
WBC # FLD AUTO: 7.26 K/UL — SIGNIFICANT CHANGE UP (ref 3.8–10.5)

## 2019-01-30 PROCEDURE — 99233 SBSQ HOSP IP/OBS HIGH 50: CPT

## 2019-01-30 PROCEDURE — 99232 SBSQ HOSP IP/OBS MODERATE 35: CPT | Mod: GC

## 2019-01-30 PROCEDURE — 99255 IP/OBS CONSLTJ NEW/EST HI 80: CPT

## 2019-01-30 RX ORDER — SIMETHICONE 80 MG/1
80 TABLET, CHEWABLE ORAL ONCE
Qty: 0 | Refills: 0 | Status: COMPLETED | OUTPATIENT
Start: 2019-01-30 | End: 2019-01-30

## 2019-01-30 RX ORDER — LACTOBACILLUS ACIDOPHILUS 100MM CELL
1 CAPSULE ORAL
Qty: 0 | Refills: 0 | Status: DISCONTINUED | OUTPATIENT
Start: 2019-01-30 | End: 2019-02-01

## 2019-01-30 RX ORDER — SODIUM CHLORIDE 9 MG/ML
1000 INJECTION INTRAMUSCULAR; INTRAVENOUS; SUBCUTANEOUS
Qty: 0 | Refills: 0 | Status: DISCONTINUED | OUTPATIENT
Start: 2019-01-30 | End: 2019-02-01

## 2019-01-30 RX ORDER — POTASSIUM PHOSPHATE, MONOBASIC POTASSIUM PHOSPHATE, DIBASIC 236; 224 MG/ML; MG/ML
15 INJECTION, SOLUTION INTRAVENOUS ONCE
Qty: 0 | Refills: 0 | Status: COMPLETED | OUTPATIENT
Start: 2019-01-30 | End: 2019-01-30

## 2019-01-30 RX ORDER — INSULIN LISPRO 100/ML
10 VIAL (ML) SUBCUTANEOUS
Qty: 0 | Refills: 0 | Status: DISCONTINUED | OUTPATIENT
Start: 2019-01-30 | End: 2019-01-30

## 2019-01-30 RX ORDER — ONDANSETRON 8 MG/1
4 TABLET, FILM COATED ORAL ONCE
Qty: 0 | Refills: 0 | Status: DISCONTINUED | OUTPATIENT
Start: 2019-01-30 | End: 2019-02-01

## 2019-01-30 RX ORDER — INSULIN LISPRO 100/ML
12 VIAL (ML) SUBCUTANEOUS
Qty: 0 | Refills: 0 | Status: DISCONTINUED | OUTPATIENT
Start: 2019-01-30 | End: 2019-01-31

## 2019-01-30 RX ADMIN — Medication 650 MILLIGRAM(S): at 00:39

## 2019-01-30 RX ADMIN — PANTOPRAZOLE SODIUM 40 MILLIGRAM(S): 20 TABLET, DELAYED RELEASE ORAL at 06:01

## 2019-01-30 RX ADMIN — Medication 1: at 17:42

## 2019-01-30 RX ADMIN — Medication 1 TABLET(S): at 17:43

## 2019-01-30 RX ADMIN — Medication 2: at 08:23

## 2019-01-30 RX ADMIN — CEFTRIAXONE 100 GRAM(S): 500 INJECTION, POWDER, FOR SOLUTION INTRAMUSCULAR; INTRAVENOUS at 15:58

## 2019-01-30 RX ADMIN — Medication 650 MILLIGRAM(S): at 01:15

## 2019-01-30 RX ADMIN — ATORVASTATIN CALCIUM 40 MILLIGRAM(S): 80 TABLET, FILM COATED ORAL at 22:57

## 2019-01-30 RX ADMIN — AZITHROMYCIN 250 MILLIGRAM(S): 500 TABLET, FILM COATED ORAL at 15:58

## 2019-01-30 RX ADMIN — LACOSAMIDE 150 MILLIGRAM(S): 50 TABLET ORAL at 17:45

## 2019-01-30 RX ADMIN — Medication 75 MILLIGRAM(S): at 17:43

## 2019-01-30 RX ADMIN — Medication 30 MILLIGRAM(S): at 06:01

## 2019-01-30 RX ADMIN — INSULIN GLARGINE 35 UNIT(S): 100 INJECTION, SOLUTION SUBCUTANEOUS at 22:57

## 2019-01-30 RX ADMIN — Medication 300 MILLIGRAM(S): at 16:00

## 2019-01-30 RX ADMIN — ESCITALOPRAM OXALATE 10 MILLIGRAM(S): 10 TABLET, FILM COATED ORAL at 17:43

## 2019-01-30 RX ADMIN — Medication 15 UNIT(S): at 12:45

## 2019-01-30 RX ADMIN — SODIUM CHLORIDE 100 MILLILITER(S): 9 INJECTION INTRAMUSCULAR; INTRAVENOUS; SUBCUTANEOUS at 08:23

## 2019-01-30 RX ADMIN — Medication 12 UNIT(S): at 17:42

## 2019-01-30 RX ADMIN — POTASSIUM PHOSPHATE, MONOBASIC POTASSIUM PHOSPHATE, DIBASIC 62.5 MILLIMOLE(S): 236; 224 INJECTION, SOLUTION INTRAVENOUS at 17:42

## 2019-01-30 RX ADMIN — ESCITALOPRAM OXALATE 10 MILLIGRAM(S): 10 TABLET, FILM COATED ORAL at 06:01

## 2019-01-30 RX ADMIN — SODIUM CHLORIDE 100 MILLILITER(S): 9 INJECTION INTRAMUSCULAR; INTRAVENOUS; SUBCUTANEOUS at 01:04

## 2019-01-30 RX ADMIN — SODIUM CHLORIDE 75 MILLILITER(S): 9 INJECTION INTRAMUSCULAR; INTRAVENOUS; SUBCUTANEOUS at 23:48

## 2019-01-30 RX ADMIN — SODIUM CHLORIDE 100 MILLILITER(S): 9 INJECTION INTRAMUSCULAR; INTRAVENOUS; SUBCUTANEOUS at 23:00

## 2019-01-30 RX ADMIN — Medication 1 TABLET(S): at 08:21

## 2019-01-30 RX ADMIN — Medication 15 UNIT(S): at 08:23

## 2019-01-30 RX ADMIN — LACOSAMIDE 150 MILLIGRAM(S): 50 TABLET ORAL at 06:01

## 2019-01-30 RX ADMIN — Medication 15 MILLIGRAM(S): at 06:01

## 2019-01-30 NOTE — PROGRESS NOTE ADULT - PROBLEM SELECTOR PLAN 4
likely multifactorial - repeat chem, VBG as noted above, - SCr 1.7 (baseline ~1.4), CKD stage 3 - likely pre-renal in setting of sepsis, poor PO intake, and GI losses (n/v/d), s/p 2L bolus in ED, c/w IVF   - trend SCr, creatinine improving, avoid nephrotoxic medications

## 2019-01-30 NOTE — CONSULT NOTE ADULT - ATTENDING COMMENTS
Uncontrolled DM2 HBA1c 14%. Stopped insulin at home when not feeling well.  Reinforce compliance with basal bolus plan.  She can't remember the name of her endocrinologist.  Nutrition consult.  Will follow.
Yusuf Stanford  Attending Physician   Division of Infectious Disease  Pager #[phone]  After 5pm/weekend or no response, call #[phone]

## 2019-01-30 NOTE — DIETITIAN INITIAL EVALUATION ADULT. - NS AS NUTRI INTERV MEALS SNACK
Diets modified for specific foods and ingredients/1. Suggest: PO diet rx: Regular, Consistent Carbohydrate (no snacks), DASH/TLC (cholesterol and sodium restricted), Low Fat; PO supplement: Glucerna Therapeutic Nutrition 8oz. 1x daily (will provide additional ~220kcals, ~10g protein);          2. Encourage & assist Pt with meals; Monitor PO diet tolerance; Honor food preferences;            3. Monitor labs, weights, hydration status;            4. Suggest outpatient follow up with an endocrinologist to ensure long-term DM diet comprehension and compliance. Suggest outpatient follow up with appropriate RD for the purposes of long-term nutrition evaluation and diet education;/Other (specify)

## 2019-01-30 NOTE — PROGRESS NOTE ADULT - PROBLEM SELECTOR PLAN 9
amlodipine and losartan held, f/u bp closely - DVT ppx: IMPROVE score of 0, encourage ambulation  - DIET: DASH/TLC, Carbohydrate restricted

## 2019-01-30 NOTE — PROGRESS NOTE ADULT - PROBLEM SELECTOR PLAN 8
- c/w phenytoin 300mg daily and vimpat 150mg BID  - likely not been taking phenytoin level <.6 amlodipine and losartan held, f/u bp closely

## 2019-01-30 NOTE — PROGRESS NOTE ADULT - PROBLEM SELECTOR PLAN 1
- p/w fever and productive cough, RVP positive for influenza A, CXR with bilateral patchy opacities R>L, CT confirms new multiple bilateral opacities c/w pneumonia  - increased dose of Tamiflu 75mg BID as renal function has improved, c/w CTX and azithromycin for CAP coverage, if pt. decompensates add vancomycin for possible post obstructive PNA  - droplet precautions, tylenol PRN fever, c/w IVF  ID consulted, appreciate consult, check legionella and sputum culture

## 2019-01-30 NOTE — PROGRESS NOTE ADULT - PROBLEM SELECTOR PLAN 7
- dx 2010 via biopsy showing noncaseating granulomas on prednisone  - s/p solu-medrol 125mg IV in ED   - however, pt. not hypotensive prior to solu-medrol, and currently maintaining blood pressures, thus will continue with home dose of prednisone 15mg daily starting tomorrow morning, if becomes hypotensive resume stress dose steroids  - GI stress ulcer prophylaxis with pantoprazole 40mg daily - c/w phenytoin 300mg daily and vimpat 150mg BID  - likely not been taking phenytoin level <.6

## 2019-01-30 NOTE — CONSULT NOTE ADULT - ASSESSMENT
48F hx of sarcoidosis (dx 2010 via biopsy showing noncaseating granulomas on prednisone), seizure disorder, T2DM (A1C >15.5% in 09/2018), CKD stage 3, HTN, HLD, who presents with fevers and cough, influenza, PNA

## 2019-01-30 NOTE — DIETITIAN INITIAL EVALUATION ADULT. - OTHER INFO
Nutrition Consult X Assessment, Education; 49 yo F with PMHx of uncontrolled DM on insulin. HgA1C 14.0%. At time of visit Pt appears alert, oriented; Per Pt her appetite has been poor for past few weeks. Food preferences discussed; Pt agreed to try PO supplement: Glucerna Shake. No chewing difficulty, but Pt C/O swallowing difficulty. Per Pt it harts when she swallows her food. RDN offered altered consistency (likes Soft, Mechanical Soft) but Pt declined. Pt vomited this morning, but no C/O nausea/vomiting/diarrhea @ this time. Per Pt her body weight: ~127#; Pt also stated her body fluctuates ~2-5# every now and then. Of note Pt's HbA1c level 14.0% (1/29). At times Pt uses Regular sugar/honey to her beverages reported. Consistent Carbohydrate diet discussed with Pt including better food choices, foods to avoid; RDN answered questions/concerns related to diet; written materials on diet also provided. Pt verbalized understanding. RDN remains available, Pt made aware.

## 2019-01-30 NOTE — DIETITIAN INITIAL EVALUATION ADULT. - PERTINENT LABORATORY DATA
(1/30) H/H 10.0/30.7 L, phosphorus 1.8 L, Cl 108 H, BUN 26 H, Glu 207 H;              (1/29) Albumin 3.2 L, HbA1c 14.0% H

## 2019-01-30 NOTE — DIETITIAN INITIAL EVALUATION ADULT. - PROBLEM SELECTOR PROBLEM 3
UTI (urinary tract infection) Abdomen soft, nontender, nondistended, bowel sounds present in all 4 quadrants.

## 2019-01-30 NOTE — PROGRESS NOTE ADULT - PROBLEM SELECTOR PLAN 6
- pt. c/o dysuria, UA with blood and bacteria, however negative for leukocyte esterase and nitrites   - urine cultures pending, covering for UTI with CTX as pt. on it for CAP as above - dx 2010 via biopsy showing noncaseating granulomas on prednisone  - s/p solu-medrol 125mg IV in ED   - however, pt. not hypotensive prior to solu-medrol, and currently maintaining blood pressures, thus will continue with home dose of prednisone 15mg daily starting tomorrow morning, if becomes hypotensive resume stress dose steroids  - GI stress ulcer prophylaxis with pantoprazole 40mg daily

## 2019-01-30 NOTE — PROGRESS NOTE ADULT - ASSESSMENT
ASSESSMENT:  49yo F, PMHx sarcoidosis (diagnosed in 2010 via biopsy, on chronic prednisone), seizure disorder (on phenytoin and vimpat), DM2 (HgbA1C 14%), CKD stage 3, HTN, HLD, admitted for sepsis secondary to influenzae A. Endocrine consulted for hyperglycemia in the setting of chronic steroid use.      PLAN:  - Lantus 35 and Humalog 10 TID, given poor PO intake, and moderate ISS  - Can uptitrate Humalog if PO intake improves  - Continue to monitor FS TID with meals and at bedtime  - Nutrition consult with RD    Will discuss with team    Mamie Boyer, PGY-1 ASSESSMENT:  47yo F, PMHx sarcoidosis (diagnosed in 2010 via biopsy, on chronic prednisone), seizure disorder (on phenytoin and vimpat), DM2 (HgbA1C 14%), CKD stage 3, HTN, HLD, admitted for sepsis secondary to influenzae A. Endocrine consulted for hyperglycemia in the setting of chronic steroid use.      PLAN:  - Lantus 35 and Humalog 12 TID, given poor PO intake, and moderate ISS  - Can uptitrate Humalog if PO intake improves  - Continue to monitor FS TID with meals and at bedtime  - Nutrition consult with RD    Will discuss with team    Mamie Boyer, PGY-1

## 2019-01-30 NOTE — PROGRESS NOTE ADULT - SUBJECTIVE AND OBJECTIVE BOX
Patient is a 48y old  Female who presents with a chief complaint of influenza A, PNA (30 Jan 2019 13:15)      SUBJECTIVE / OVERNIGHT EVENTS: Pt seen and examined at 12:20pm, no overnight events, pt reports that she feels better comparatively, has cough with whitish to yellowish sputum and mild sob, no chest pain no other complaints.    MEDICATIONS  (STANDING):  atorvastatin 40 milliGRAM(s) Oral at bedtime  azithromycin  IVPB 500 milliGRAM(s) IV Intermittent every 24 hours  cefTRIAXone   IVPB 1 Gram(s) IV Intermittent every 24 hours  dextrose 50% Injectable 25 Gram(s) IV Push once  escitalopram 10 milliGRAM(s) Oral <User Schedule>  insulin glargine Injectable (LANTUS) 35 Unit(s) SubCutaneous at bedtime  insulin lispro (HumaLOG) corrective regimen sliding scale   SubCutaneous three times a day before meals  insulin lispro (HumaLOG) corrective regimen sliding scale   SubCutaneous at bedtime  insulin lispro Injectable (HumaLOG) 12 Unit(s) SubCutaneous three times a day before meals  lacosamide 150 milliGRAM(s) Oral two times a day  lactobacillus acidophilus 1 Tablet(s) Oral two times a day with meals  ondansetron Injectable 4 milliGRAM(s) IV Push once  oseltamivir 75 milliGRAM(s) Oral two times a day  pantoprazole    Tablet 40 milliGRAM(s) Oral before breakfast  phenytoin   Capsule 300 milliGRAM(s) Oral daily  predniSONE   Tablet 15 milliGRAM(s) Oral daily  sodium chloride 0.9%. 1000 milliLiter(s) (100 mL/Hr) IV Continuous <Continuous>    MEDICATIONS  (PRN):  acetaminophen   Tablet .. 650 milliGRAM(s) Oral every 6 hours PRN Temp greater or equal to 38C (100.4F), Mild Pain (1 - 3), Moderate Pain (4 - 6)  dextrose 40% Gel 15 Gram(s) Oral once PRN Blood Glucose LESS THAN 70 milliGRAM(s)/deciliter      Vital Signs Last 24 Hrs  T(C): 36.7 (30 Jan 2019 11:42), Max: 36.8 (29 Jan 2019 20:40)  T(F): 98 (30 Jan 2019 11:42), Max: 98.2 (29 Jan 2019 20:40)  HR: 69 (30 Jan 2019 11:42) (69 - 77)  BP: 113/69 (30 Jan 2019 11:42) (113/69 - 135/85)  BP(mean): --  RR: 16 (30 Jan 2019 11:42) (16 - 18)  SpO2: 99% (30 Jan 2019 11:42) (96% - 100%)  CAPILLARY BLOOD GLUCOSE      POCT Blood Glucose.: 157 mg/dL (30 Jan 2019 17:30)  POCT Blood Glucose.: 108 mg/dL (30 Jan 2019 12:08)  POCT Blood Glucose.: 205 mg/dL (30 Jan 2019 07:42)  POCT Blood Glucose.: 277 mg/dL (29 Jan 2019 21:26)    I&O's Summary      PHYSICAL EXAM:  GENERAL: NAD, well-developed  CHEST/LUNG: Decreased bs mid to lower lung fields b/l  HEART: Regular rate and rhythm  ABDOMEN: Soft, Nontender, Nondistended  EXTREMITIES: No LE edema  PSYCH: Calm  NEUROLOGY: AAOx3  SKIN: No rashes or lesions    LABS:                        10.0   7.26  )-----------( 334      ( 30 Jan 2019 07:05 )             30.7     01-30    140  |  108<H>  |  26<H>  ----------------------------<  207<H>  3.8   |  20<L>  |  1.08    Ca    7.8<L>      30 Jan 2019 07:05  Phos  1.8     01-30  Mg     1.7     01-30    TPro  7.5  /  Alb  3.2<L>  /  TBili  0.3  /  DBili  x   /  AST  10  /  ALT  5   /  AlkPhos  40  01-29              RADIOLOGY & ADDITIONAL TESTS:    Imaging Personally Reviewed:    Consultant(s) Notes Reviewed:      Care Discussed with Consultants/Other Providers:

## 2019-01-30 NOTE — PROGRESS NOTE ADULT - PROBLEM SELECTOR PLAN 10
- DVT ppx: IMPROVE score of 0, encourage ambulation  - DIET: DASH/TLC, Carbohydrate restricted repleted phos

## 2019-01-30 NOTE — CONSULT NOTE ADULT - PROBLEM SELECTOR RECOMMENDATION 4
-cont tamiflu  -viral vs secondary bacterial PNA  -ceftriaxone 1 gm iv q24  -zithromax 500 mg iv q24  -improving   -check sputum cx and urine legionella ag

## 2019-01-30 NOTE — DIETITIAN INITIAL EVALUATION ADULT. - PERTINENT MEDS FT
Lactobacillus Acidophilus, Zofran (PRN), K-Phos, Insulin (Humalog), Insulin (Lantus), Lipitor, Protonix, Dilantin

## 2019-01-30 NOTE — PROGRESS NOTE ADULT - PROBLEM SELECTOR PLAN 5
- SCr 1.7 (baseline ~1.4), CKD stage 3 - likely pre-renal in setting of sepsis, poor PO intake, and GI losses (n/v/d), s/p 2L bolus in ED, c/w IVF   - trend SCr, creatinine improving, avoid nephrotoxic medications - pt. c/o dysuria, UA with blood and bacteria, however negative for leukocyte esterase and nitrites   - urine cultures pending, covering for UTI with CTX as pt. on it for CAP as above

## 2019-01-30 NOTE — CONSULT NOTE ADULT - SUBJECTIVE AND OBJECTIVE BOX
LEONEL BEVERLY 48y Female  MRN-[id number]    Patient is a 48y old  Female who presents with a chief complaint of influenza A, PNA (2019 16:01)      HPI:  48F hx of sarcoidosis (dx 2010 via biopsy showing noncaseating granulomas on prednisone), seizure disorder, T2DM (A1C >15.5% in 2018), CKD stage 3, HTN, HLD, who presents with fevers and cough. Over the past 3 weeks pt. has not been feeling well, with subjective fevers at home as well as productive cough with yellow sputum. Of note, pt. had multiple family members at home with the flu, however they recovered quickly while pt. continued to get worse. She has been unable to tolerate PO intake, and began to have nausea/vomiting/diarrhea a few days ago associated with diffuse, crampy abdominal pain. She denies any blood in vomitus or BMs. She took tylenol one day and nyquil last night, but denies antibiotic use or recent hospitalizations. In addition, this morning she had dysuria.  She denies headache, neck stiffness, chest pain, shortness of breath, rashes, joint pains, recent travel.     In the ED - Tm 103.4, , /81, RR 22, 100% on RA  Labs notable for SCr. 1.7, anion gap 20, lactate 3.2, UA with moderate blood, protein, and bacteria, RVP +Influenza  CXR with bibasilar reticular interstitial opacities right greater than left   s/p ceftriaxone, azithromycin, 2L NS bolus, tyelnol, solu-medrol 125mg IV x1 (2019 20:33)    Feels better. No fever.    Did not get flu shot    PAST MEDICAL & SURGICAL HISTORY:  Seizure disorder  Chronic kidney disease (CKD), stage III (moderate)  Hyperlipidemia  Hypertension  Diabetes  Sarcoidosis  No significant past surgical history      Allergies    No Known Allergies    Intolerances        ANTIMICROBIALS:  azithromycin  IVPB 500 every 24 hours  cefTRIAXone   IVPB 1 every 24 hours  oseltamivir 75 two times a day      MEDICATIONS  (STANDING):  atorvastatin 40 milliGRAM(s) Oral at bedtime  azithromycin  IVPB 500 milliGRAM(s) IV Intermittent every 24 hours  cefTRIAXone   IVPB 1 Gram(s) IV Intermittent every 24 hours  dextrose 50% Injectable 25 Gram(s) IV Push once  escitalopram 10 milliGRAM(s) Oral <User Schedule>  insulin glargine Injectable (LANTUS) 35 Unit(s) SubCutaneous at bedtime  insulin lispro (HumaLOG) corrective regimen sliding scale   SubCutaneous three times a day before meals  insulin lispro (HumaLOG) corrective regimen sliding scale   SubCutaneous at bedtime  insulin lispro Injectable (HumaLOG) 15 Unit(s) SubCutaneous three times a day before meals  lacosamide 150 milliGRAM(s) Oral two times a day  lactobacillus acidophilus 1 Tablet(s) Oral two times a day with meals  ondansetron Injectable 4 milliGRAM(s) IV Push once  oseltamivir 75 milliGRAM(s) Oral two times a day  pantoprazole    Tablet 40 milliGRAM(s) Oral before breakfast  phenytoin   Capsule 300 milliGRAM(s) Oral daily  potassium phosphate IVPB 15 milliMole(s) IV Intermittent once  predniSONE   Tablet 15 milliGRAM(s) Oral daily  sodium chloride 0.9%. 1000 milliLiter(s) (100 mL/Hr) IV Continuous <Continuous>      Social History  Smoking: no  Etoh: no  Drug use: no      FAMILY HISTORY:  Family history of diabetes mellitus in first degree relative (Mother)      Vital Signs Last 24 Hrs  T(C): 36.7 (2019 11:42), Max: 36.8 (2019 20:40)  T(F): 98 (2019 11:42), Max: 98.2 (2019 20:40)  HR: 69 (2019 11:42) (69 - 82)  BP: 113/69 (2019 11:42) (113/69 - 140/92)  BP(mean): --  RR: 16 (2019 11:42) (16 - 20)  SpO2: 99% (2019 11:42) (96% - 100%)    CBC Full  -  ( 2019 07:05 )  WBC Count : 7.26 K/uL  Hemoglobin : 10.0 g/dL  Hematocrit : 30.7 %  Platelet Count - Automated : 334 K/uL  Mean Cell Volume : 81.2 fL  Mean Cell Hemoglobin : 26.5 pg  Mean Cell Hemoglobin Concentration : 32.6 %  Auto Neutrophil # : 6.18 K/uL  Auto Lymphocyte # : 0.67 K/uL  Auto Monocyte # : 0.32 K/uL  Auto Eosinophil # : 0.00 K/uL  Auto Basophil # : 0.01 K/uL  Auto Neutrophil % : 85.2 %  Auto Lymphocyte % : 9.2 %  Auto Monocyte % : 4.4 %  Auto Eosinophil % : 0.0 %  Auto Basophil % : 0.1 %        140  |  108<H>  |  26<H>  ----------------------------<  207<H>  3.8   |  20<L>  |  1.08    Ca    7.8<L>      2019 07:05  Phos  1.8       Mg     1.7         TPro  7.5  /  Alb  3.2<L>  /  TBili  0.3  /  DBili  x   /  AST  10  /  ALT  5   /  AlkPhos  40      LIVER FUNCTIONS - ( 2019 06:30 )  Alb: 3.2 g/dL / Pro: 7.5 g/dL / ALK PHOS: 40 u/L / ALT: 5 u/L / AST: 10 u/L / GGT: x           Urinalysis Basic - ( 2019 13:15 )    Color: YELLOW / Appearance: Lt TURBID / S.024 / pH: 6.0  Gluc: 1000 / Ketone: SMALL  / Bili: NEGATIVE / Urobili: NORMAL   Blood: MODERATE / Protein: >600 / Nitrite: NEGATIVE   Leuk Esterase: NEGATIVE / RBC: 3-5 / WBC 11-25   Sq Epi: MODERATE / Non Sq Epi: x / Bacteria: MANY        MICROBIOLOGY:  URINE MIDSTREAM  19 --  --  --    Culture - Blood (19 @ 14:33)    Culture - Blood:   NO ORGANISMS ISOLATED AT 24 HOURS    Culture - Blood:   ***Blood Panel PCR results on this specimen are available  approximately 3 hours after the Gram stain result***  Gram stain, PCR, and/or culture results may not always  correspond due to difference in methodologies  ------------------------------------------------------------  This PCR assay was performed using [facility]  The  following targets are tested for:  Enterococcus, vancomycin  resistant enterococci, Listeria monocytogenes,  coagulase  negative staphylococci, S. aureus, methicillin resistant S.  aureus, Streptococcus agalactiae (Group B), S. pneumoniae,  S. pyogenes (Group A), Acinetobacter baumannii, Enterobacter  cloacae, E. coli, Klebsiella oxytoca, K. pneumoniae, Proteus  sp., Serratia marcescens, Haemophilus influenzae, Neisseria  meningitidis, Pseudomonas aeruginosa, Candida albicans, C.  glabrata, C. krusei, C. parapsilosis, C. tropicalis and the  KPC resistance gene.  **NOTE: Due to technical problems, Proteus sp. will NOT be  reported as part of the BCID paneluntil further notice.    -  Coagulase negative Staphylococcus: + DETECT DOMI    Specimen Source: BLOOD VENOUS    Organism: BLOOD CULTURE PCR    Gram Stain Blood:   GPCCL^Gram Pos Cocci In Clusters  AFTER: 32 HOURS INCUBATION  BOTTLE: ANAEROBIC BOTTLE    Gram Stain Blood:   ***** CRITICAL RESULT *****  PERSON CALLED / READ-BACK: KEILA CONWAY R.N./RAD  DATE / TIME CALLED: 19 0235  CALLED BY: SUSSY COHEN                *******************************                * This is an appended result. *          *******************************  A prior result that was reported as final has been changed.  GPCCL^Gram Pos Cocci In Clusters  AFTER: 32 HOURS INCUBATION  BOTTLE: AEROBIC   ANAEROBIC BOTTLES    Organism Identification: BLOOD CULTURE PCR    Method Type: PCR        Rapid Respiratory Viral Panel (19 @ 13:15)    Adenovirus (RapRVP): Not Detected    Influenza AH1 2009 (RapRVP): Detected    Influenza AH1 (RapRVP): Not Detected    Influenza AH3 (RapRVP): Not Detected    Influenza B (RapRVP): Not Detected    Parainfluenza 1 (RapRVP): Not Detected    Parainfluenza 2 (RapRVP): Not Detected    Parainfluenza 3 (RapRVP): Not Detected    Parainfluenza 4 (RapRVP): Not Detected    Resp Syncytial Virus (RapRVP): Not Detected    Chlamydia pneumoniae (RapRVP): Not Detected    Mycoplasma pneumoniae (RapRVP): Not Detected    Entero/Rhinovirus (RapRVP): Not Detected    hMPV (RapRVP): Not Detected    Coronavirus (229E,HKU1,NL63,OC43): Not Detected This Respiratory Panel uses polymerase chain reaction (PCR)  to detect for adenovirus; coronavirus (HKU1, NL63, 229E,  OC43); human metapneumovirus (hMPV); human  enterovirus/rhinovirus (Entero/RV); influenza A; influenza  A/H1: influenza A/H3; influenza A/H1-2009; influenza B;  parainfluenza viruses 1,2,3,4; respiratory syncytial virus;  Mycoplasma pneumoniae; and Chlamydophila pneumoniae.      RADIOLOGY    CT Chest No Cont (19 @ 22:09) >  Multiple bilateral nodular and patchy opacities scattered   throughout both lungs new since 2017 are suggestive of   infection. One month follow-up chest CT is recommended to ensure   resolution.    Bilateral peripheral lower lung cysts are predominantly unchanged since   2017.    Bilateral axillary enlarged lymph nodes with overall interval improvement   and decrease in size since 2017

## 2019-01-30 NOTE — PROGRESS NOTE ADULT - SUBJECTIVE AND OBJECTIVE BOX
Chief Complaint: Influenza A, PNA    Interval History: Patient reports feeling better than yesterday. Reports that she still does not have much of an appetite because her throat is sore, but endorses that her odynophagia is improved from yesterday. She estimates that she only eats about 25% of each meal.  Patient reports that she felt lightheaded when her blood glucose level goes below 200. Earlier today when he blood glucose was 205, she reported feeling lightheaded and had to eat something in order to feel better.    Her regular Endocrinologist is Dr. Camp. She reports that she has not been able to see her for the last 2 months due to insurance problems but now those problems have resolved.    MEDICATIONS  (STANDING):  atorvastatin 40 milliGRAM(s) Oral at bedtime  azithromycin  IVPB 500 milliGRAM(s) IV Intermittent every 24 hours  cefTRIAXone   IVPB 1 Gram(s) IV Intermittent every 24 hours  dextrose 50% Injectable 25 Gram(s) IV Push once  escitalopram 10 milliGRAM(s) Oral <User Schedule>  insulin glargine Injectable (LANTUS) 35 Unit(s) SubCutaneous at bedtime  insulin lispro (HumaLOG) corrective regimen sliding scale   SubCutaneous three times a day before meals  insulin lispro (HumaLOG) corrective regimen sliding scale   SubCutaneous at bedtime  insulin lispro Injectable (HumaLOG) 15 Unit(s) SubCutaneous three times a day before meals  lacosamide 150 milliGRAM(s) Oral two times a day  lactobacillus acidophilus 1 Tablet(s) Oral two times a day with meals  ondansetron Injectable 4 milliGRAM(s) IV Push once  oseltamivir 75 milliGRAM(s) Oral two times a day  pantoprazole    Tablet 40 milliGRAM(s) Oral before breakfast  phenytoin   Capsule 300 milliGRAM(s) Oral daily  potassium phosphate IVPB 15 milliMole(s) IV Intermittent once  predniSONE   Tablet 15 milliGRAM(s) Oral daily  sodium chloride 0.9%. 1000 milliLiter(s) (100 mL/Hr) IV Continuous <Continuous>    MEDICATIONS  (PRN):  acetaminophen   Tablet .. 650 milliGRAM(s) Oral every 6 hours PRN Temp greater or equal to 38C (100.4F), Mild Pain (1 - 3), Moderate Pain (4 - 6)  dextrose 40% Gel 15 Gram(s) Oral once PRN Blood Glucose LESS THAN 70 milliGRAM(s)/deciliter      Allergies    No Known Allergies    Intolerances      Review of Systems:  Constitutional: No fever  Eyes: No acute visual changes  Neuro: No tremors  HEENT: Sore throat, improving  Cardiovascular: No chest pain, palpitations  Respiratory: No SOB, cough improving  GI: No nausea, vomiting, abdominal pain  : No dysuria  Skin: no rash  Psych: no depression  Endocrine: no polyuria, polydipsia  Hem/lymph: no swelling  Osteoporosis: no fractures    ALL OTHER SYSTEMS REVIEWED AND NEGATIVE    UNABLE TO OBTAIN    PHYSICAL EXAM:  VITALS: T(C): 36.7 (01-30-19 @ 11:42)  T(F): 98 (01-30-19 @ 11:42), Max: 98.2 (01-29-19 @ 20:40)  HR: 69 (01-30-19 @ 11:42) (69 - 82)  BP: 113/69 (01-30-19 @ 11:42) (113/69 - 140/92)  RR:  (16 - 20)  SpO2:  (96% - 100%)  Wt(kg): --  GENERAL: NAD, well-groomed  EYES: No proptosis, no lid lag, anicteric  HEENT:  Atraumatic, Normocephalic, moist mucous membranes  THYROID: Normal size, no palpable nodules  RESPIRATORY: Clear to auscultation anteriorly bilaterally  CARDIOVASCULAR: Regular rate and rhythm; No murmurs; no peripheral edema  GI: Soft, nontender, non distended  SKIN: Dry, intact, No rashes or lesions  MUSCULOSKELETAL: Full range of motion, normal strength  NEURO: extraocular movements intact, no tremor  PSYCH: Alert and oriented x 3, normal affect, normal mood      CAPILLARY BLOOD GLUCOSE      POCT Blood Glucose.: 108 mg/dL (30 Jan 2019 12:08)  POCT Blood Glucose.: 205 mg/dL (30 Jan 2019 07:42)  POCT Blood Glucose.: 277 mg/dL (29 Jan 2019 21:26)  POCT Blood Glucose.: 344 mg/dL (29 Jan 2019 17:21)      01-30    140  |  108<H>  |  26<H>  ----------------------------<  207<H>  3.8   |  20<L>  |  1.08    EGFR if : 71  EGFR if non : 62    Ca    7.8<L>      01-30  Mg     1.7     01-30  Phos  1.8     01-30    TPro  7.5  /  Alb  3.2<L>  /  TBili  0.3  /  DBili  x   /  AST  10  /  ALT  5   /  AlkPhos  40  01-29          Thyroid Function Tests:      Hemoglobin A1C, Whole Blood: 14.0 % <H> [4.0 - 5.6] (01-29-19 @ 06:30)

## 2019-01-31 ENCOUNTER — TRANSCRIPTION ENCOUNTER (OUTPATIENT)
Age: 49
End: 2019-01-31

## 2019-01-31 DIAGNOSIS — E87.8 OTHER DISORDERS OF ELECTROLYTE AND FLUID BALANCE, NOT ELSEWHERE CLASSIFIED: ICD-10-CM

## 2019-01-31 DIAGNOSIS — E11.65 TYPE 2 DIABETES MELLITUS WITH HYPERGLYCEMIA: ICD-10-CM

## 2019-01-31 DIAGNOSIS — R78.81 BACTEREMIA: ICD-10-CM

## 2019-01-31 LAB
-  COAGULASE NEGATIVE STAPHYLOCOCCUS: SIGNIFICANT CHANGE UP
ALBUMIN SERPL ELPH-MCNC: 2.8 G/DL — LOW (ref 3.3–5)
ALP SERPL-CCNC: 30 U/L — LOW (ref 40–120)
ALT FLD-CCNC: < 4 U/L — LOW (ref 4–33)
ANION GAP SERPL CALC-SCNC: 11 MMO/L — SIGNIFICANT CHANGE UP (ref 7–14)
AST SERPL-CCNC: 10 U/L — SIGNIFICANT CHANGE UP (ref 4–32)
BACTERIA BLD CULT: SIGNIFICANT CHANGE UP
BASOPHILS # BLD AUTO: 0.01 K/UL — SIGNIFICANT CHANGE UP (ref 0–0.2)
BASOPHILS NFR BLD AUTO: 0.1 % — SIGNIFICANT CHANGE UP (ref 0–2)
BASOPHILS NFR SPEC: 0 % — SIGNIFICANT CHANGE UP (ref 0–2)
BILIRUB SERPL-MCNC: < 0.2 MG/DL — LOW (ref 0.2–1.2)
BLASTS # FLD: 0 % — SIGNIFICANT CHANGE UP (ref 0–0)
BUN SERPL-MCNC: 16 MG/DL — SIGNIFICANT CHANGE UP (ref 7–23)
CALCIUM SERPL-MCNC: 7.8 MG/DL — LOW (ref 8.4–10.5)
CHLORIDE SERPL-SCNC: 113 MMOL/L — HIGH (ref 98–107)
CO2 SERPL-SCNC: 21 MMOL/L — LOW (ref 22–31)
CREAT SERPL-MCNC: 1.09 MG/DL — SIGNIFICANT CHANGE UP (ref 0.5–1.3)
EOSINOPHIL # BLD AUTO: 0.03 K/UL — SIGNIFICANT CHANGE UP (ref 0–0.5)
EOSINOPHIL NFR BLD AUTO: 0.4 % — SIGNIFICANT CHANGE UP (ref 0–6)
EOSINOPHIL NFR FLD: 0 % — SIGNIFICANT CHANGE UP (ref 0–6)
GLUCOSE BLDC GLUCOMTR-MCNC: 125 MG/DL — HIGH (ref 70–99)
GLUCOSE BLDC GLUCOMTR-MCNC: 67 MG/DL — LOW (ref 70–99)
GLUCOSE BLDC GLUCOMTR-MCNC: 81 MG/DL — SIGNIFICANT CHANGE UP (ref 70–99)
GLUCOSE BLDC GLUCOMTR-MCNC: 87 MG/DL — SIGNIFICANT CHANGE UP (ref 70–99)
GLUCOSE BLDC GLUCOMTR-MCNC: 99 MG/DL — SIGNIFICANT CHANGE UP (ref 70–99)
GLUCOSE SERPL-MCNC: 67 MG/DL — LOW (ref 70–99)
HCT VFR BLD CALC: 30.9 % — LOW (ref 34.5–45)
HGB BLD-MCNC: 9.8 G/DL — LOW (ref 11.5–15.5)
HYPOCHROMIA BLD QL: SLIGHT — SIGNIFICANT CHANGE UP
IMM GRANULOCYTES NFR BLD AUTO: 2.8 % — HIGH (ref 0–1.5)
L PNEUMO AG UR QL: NEGATIVE — SIGNIFICANT CHANGE UP
LYMPHOCYTES # BLD AUTO: 1.31 K/UL — SIGNIFICANT CHANGE UP (ref 1–3.3)
LYMPHOCYTES # BLD AUTO: 19.3 % — SIGNIFICANT CHANGE UP (ref 13–44)
LYMPHOCYTES NFR SPEC AUTO: 14.8 % — SIGNIFICANT CHANGE UP (ref 13–44)
MAGNESIUM SERPL-MCNC: 1.4 MG/DL — LOW (ref 1.6–2.6)
MCHC RBC-ENTMCNC: 26.6 PG — LOW (ref 27–34)
MCHC RBC-ENTMCNC: 31.7 % — LOW (ref 32–36)
MCV RBC AUTO: 84 FL — SIGNIFICANT CHANGE UP (ref 80–100)
METAMYELOCYTES # FLD: 0.9 % — SIGNIFICANT CHANGE UP (ref 0–1)
MICROCYTES BLD QL: SLIGHT — SIGNIFICANT CHANGE UP
MONOCYTES # BLD AUTO: 0.52 K/UL — SIGNIFICANT CHANGE UP (ref 0–0.9)
MONOCYTES NFR BLD AUTO: 7.7 % — SIGNIFICANT CHANGE UP (ref 2–14)
MONOCYTES NFR BLD: 7.8 % — SIGNIFICANT CHANGE UP (ref 2–9)
MYELOCYTES NFR BLD: 0 % — SIGNIFICANT CHANGE UP (ref 0–0)
NEUTROPHIL AB SER-ACNC: 73 % — SIGNIFICANT CHANGE UP (ref 43–77)
NEUTROPHILS # BLD AUTO: 4.72 K/UL — SIGNIFICANT CHANGE UP (ref 1.8–7.4)
NEUTROPHILS NFR BLD AUTO: 69.7 % — SIGNIFICANT CHANGE UP (ref 43–77)
NEUTS BAND # BLD: 0 % — SIGNIFICANT CHANGE UP (ref 0–6)
NRBC # FLD: 0 K/UL — LOW (ref 25–125)
ORGANISM # SPEC MICROSCOPIC CNT: SIGNIFICANT CHANGE UP
OTHER - HEMATOLOGY %: 0 — SIGNIFICANT CHANGE UP
PHOSPHATE SERPL-MCNC: 2.4 MG/DL — LOW (ref 2.5–4.5)
PLATELET # BLD AUTO: 355 K/UL — SIGNIFICANT CHANGE UP (ref 150–400)
PLATELET COUNT - ESTIMATE: NORMAL — SIGNIFICANT CHANGE UP
PMV BLD: 9.2 FL — SIGNIFICANT CHANGE UP (ref 7–13)
POTASSIUM SERPL-MCNC: 3.3 MMOL/L — LOW (ref 3.5–5.3)
POTASSIUM SERPL-SCNC: 3.3 MMOL/L — LOW (ref 3.5–5.3)
PROMYELOCYTES # FLD: 0 % — SIGNIFICANT CHANGE UP (ref 0–0)
PROT SERPL-MCNC: 6.1 G/DL — SIGNIFICANT CHANGE UP (ref 6–8.3)
RBC # BLD: 3.68 M/UL — LOW (ref 3.8–5.2)
RBC # FLD: 11.9 % — SIGNIFICANT CHANGE UP (ref 10.3–14.5)
REVIEW TO FOLLOW: YES — SIGNIFICANT CHANGE UP
SODIUM SERPL-SCNC: 145 MMOL/L — SIGNIFICANT CHANGE UP (ref 135–145)
SPECIMEN SOURCE: SIGNIFICANT CHANGE UP
SPECIMEN SOURCE: SIGNIFICANT CHANGE UP
VARIANT LYMPHS # BLD: 3.5 % — SIGNIFICANT CHANGE UP
WBC # BLD: 6.78 K/UL — SIGNIFICANT CHANGE UP (ref 3.8–10.5)
WBC # FLD AUTO: 6.78 K/UL — SIGNIFICANT CHANGE UP (ref 3.8–10.5)

## 2019-01-31 PROCEDURE — 99232 SBSQ HOSP IP/OBS MODERATE 35: CPT

## 2019-01-31 PROCEDURE — 99233 SBSQ HOSP IP/OBS HIGH 50: CPT

## 2019-01-31 RX ORDER — LACOSAMIDE 50 MG/1
150 TABLET ORAL
Qty: 0 | Refills: 0 | Status: DISCONTINUED | OUTPATIENT
Start: 2019-01-31 | End: 2019-02-01

## 2019-01-31 RX ORDER — INSULIN GLARGINE 100 [IU]/ML
25 INJECTION, SOLUTION SUBCUTANEOUS AT BEDTIME
Qty: 0 | Refills: 0 | Status: DISCONTINUED | OUTPATIENT
Start: 2019-01-31 | End: 2019-02-01

## 2019-01-31 RX ORDER — INSULIN LISPRO 100/ML
8 VIAL (ML) SUBCUTANEOUS
Qty: 0 | Refills: 0 | Status: DISCONTINUED | OUTPATIENT
Start: 2019-01-31 | End: 2019-01-31

## 2019-01-31 RX ORDER — INSULIN LISPRO 100/ML
7 VIAL (ML) SUBCUTANEOUS
Qty: 0 | Refills: 0 | Status: DISCONTINUED | OUTPATIENT
Start: 2019-01-31 | End: 2019-02-01

## 2019-01-31 RX ORDER — SODIUM,POTASSIUM PHOSPHATES 278-250MG
1 POWDER IN PACKET (EA) ORAL
Qty: 0 | Refills: 0 | Status: COMPLETED | OUTPATIENT
Start: 2019-01-31 | End: 2019-02-01

## 2019-01-31 RX ORDER — MAGNESIUM OXIDE 400 MG ORAL TABLET 241.3 MG
400 TABLET ORAL ONCE
Qty: 0 | Refills: 0 | Status: COMPLETED | OUTPATIENT
Start: 2019-01-31 | End: 2019-01-31

## 2019-01-31 RX ORDER — INSULIN GLARGINE 100 [IU]/ML
30 INJECTION, SOLUTION SUBCUTANEOUS AT BEDTIME
Qty: 0 | Refills: 0 | Status: DISCONTINUED | OUTPATIENT
Start: 2019-01-31 | End: 2019-01-31

## 2019-01-31 RX ORDER — POTASSIUM CHLORIDE 20 MEQ
20 PACKET (EA) ORAL ONCE
Qty: 0 | Refills: 0 | Status: COMPLETED | OUTPATIENT
Start: 2019-01-31 | End: 2019-01-31

## 2019-01-31 RX ADMIN — LACOSAMIDE 150 MILLIGRAM(S): 50 TABLET ORAL at 06:54

## 2019-01-31 RX ADMIN — CEFTRIAXONE 100 GRAM(S): 500 INJECTION, POWDER, FOR SOLUTION INTRAMUSCULAR; INTRAVENOUS at 13:22

## 2019-01-31 RX ADMIN — AZITHROMYCIN 250 MILLIGRAM(S): 500 TABLET, FILM COATED ORAL at 19:02

## 2019-01-31 RX ADMIN — Medication 8 UNIT(S): at 13:20

## 2019-01-31 RX ADMIN — ESCITALOPRAM OXALATE 10 MILLIGRAM(S): 10 TABLET, FILM COATED ORAL at 19:03

## 2019-01-31 RX ADMIN — LACOSAMIDE 150 MILLIGRAM(S): 50 TABLET ORAL at 19:02

## 2019-01-31 RX ADMIN — Medication 1 TABLET(S): at 13:21

## 2019-01-31 RX ADMIN — Medication 300 MILLIGRAM(S): at 13:21

## 2019-01-31 RX ADMIN — SODIUM CHLORIDE 75 MILLILITER(S): 9 INJECTION INTRAMUSCULAR; INTRAVENOUS; SUBCUTANEOUS at 21:57

## 2019-01-31 RX ADMIN — INSULIN GLARGINE 25 UNIT(S): 100 INJECTION, SOLUTION SUBCUTANEOUS at 21:58

## 2019-01-31 RX ADMIN — Medication 20 MILLIEQUIVALENT(S): at 13:21

## 2019-01-31 RX ADMIN — PANTOPRAZOLE SODIUM 40 MILLIGRAM(S): 20 TABLET, DELAYED RELEASE ORAL at 06:55

## 2019-01-31 RX ADMIN — ATORVASTATIN CALCIUM 40 MILLIGRAM(S): 80 TABLET, FILM COATED ORAL at 21:57

## 2019-01-31 RX ADMIN — Medication 7 UNIT(S): at 19:02

## 2019-01-31 RX ADMIN — Medication 15 MILLIGRAM(S): at 06:55

## 2019-01-31 RX ADMIN — Medication 75 MILLIGRAM(S): at 06:54

## 2019-01-31 RX ADMIN — Medication 1 TABLET(S): at 19:03

## 2019-01-31 RX ADMIN — MAGNESIUM OXIDE 400 MG ORAL TABLET 400 MILLIGRAM(S): 241.3 TABLET ORAL at 13:21

## 2019-01-31 RX ADMIN — Medication 75 MILLIGRAM(S): at 19:03

## 2019-01-31 RX ADMIN — Medication 1 TABLET(S): at 21:57

## 2019-01-31 RX ADMIN — Medication 1 TABLET(S): at 08:36

## 2019-01-31 RX ADMIN — ESCITALOPRAM OXALATE 10 MILLIGRAM(S): 10 TABLET, FILM COATED ORAL at 06:54

## 2019-01-31 NOTE — DISCHARGE NOTE ADULT - MEDICATION SUMMARY - MEDICATIONS TO TAKE
I will START or STAY ON the medications listed below when I get home from the hospital:    predniSONE 5 mg oral tablet  -- 3 tab(s) by mouth once a day  -- Indication: For Sarcoidosis    losartan 25 mg oral tablet  -- 1 tab(s) by mouth once a day  -- Indication: For Hypertension    phenytoin 100 mg oral capsule  -- 3 cap(s) (300mg) by mouth once a day  -- Indication: For Seizure disorder    Vimpat 150 mg oral tablet  -- 1 tab(s) by mouth 2 times a day  -- Indication: For Seizure disorder    escitalopram 10 mg oral tablet  -- 1 tab(s) by mouth 2 times a day  -- Indication: For Depression    Basaglar KwikPen 100 units/mL subcutaneous solution  -- 18 unit(s) subcutaneous once a day (at bedtime)  -- Indication: For Diabetes    HumaLOG 100 units/mL injectable solution  -- 5 unit(s) subcutaneous 3 times a day (before meals)  -- Indication: For Diabetes    atorvastatin 40 mg oral tablet  -- 1 tab(s) by mouth once a day  -- Indication: For Hyperlipidemia    amLODIPine 10 mg oral tablet  -- 1 tab(s) by mouth once a day  -- Indication: For Hypertension    docusate sodium 100 mg oral capsule  -- 1 cap(s) by mouth 2 times a day  -- Indication: For Constipation Prevention    pantoprazole 40 mg oral delayed release tablet  -- 1 tab(s) by mouth once a day (before a meal)  -- Indication: For Ulcer prevention    levoFLOXacin 500 mg oral tablet  -- 1 tab(s) by mouth every 24 hours  -- Indication: For Influenza A with pneumonia

## 2019-01-31 NOTE — DISCHARGE NOTE ADULT - HOSPITAL COURSE
48F hx of sarcoidosis (dx 2010 via biopsy showing noncaseating granulomas on prednisone), seizure disorder, T2DM (A1C >15.5% in 09/2018), CKD stage 3, HTN, HLD who presents with fevers and cough a/w sepsis 2/2 influenza (RVP positive) and possibly CAP and UTI.     hospital course:     Sepsis 2/2 Influenza A with pneumonia  - tachycardic, febrile Tm 103, lactate 3.2  - RVP + influenza, CXR with bilateral opacities R > L  - 1/28 BCx- GPC TBI, UCx -NTD  - s/p Solu-medrol 125mg IV in ED, c/w home dose of Prednisone 15mg daily  - abx coverage with CTX and Azithromycin for PNA   - f/u sputum cultures, Pro-calcitonin- 2.82 high, LDH- WNL   - Tamiflu x 5 days thru 2/2  - CT Chest- Multiple bilateral nodular and patchy opacities scattered throughout both lungs new since October 20, 2017 are suggestive of infection. Follow-up chest CT in 1 month for resolution    Acute on chronic renal failure  - SCr 1.7 (baseline ~1.4), CKD stage 3, c/w IVF   - trend SCr, renally dose medications, avoid nephrotoxic medications.     High anion gap metabolic acidosis  - anion gap of 20 multifactorial from lactic acidosis in setting of sepsis   - although UA + ketones, non-acidotic pH on VBG, glucose 280, low suspicion for DKA  -A1c =14    Sarcoidosis  - dx 2010 via biopsy showing noncaseating granulomas on prednisone  - s/p solu-medrol 125mg IV in ED   - continue with home dose of Prednisone 15mg daily, Pantoprazole 40mg daily    Seizure disorder  - c/w Phenytoin 300mg daily and Vimpat 150mg BID  - Phenytoin levels- <0.6     DM2 with chronic kidney disease, with long-term current use of insulin  - Endo house consulted  - at home on Basaglar 35 units PM, and Humalog 20 units TID   - Lantus 35 and Humalog 10 TID and moderate ISS  - d/c solumedrol & continuing only home dose Prednisone 15mg qd  - FS and SSI qac/hs, HgA1C- 14.0% 1/29     Hypertension   - Amlodipine 10mg daily on Hold 2/2 soft BP  - hold Losartan in setting of MIKALA    Need for prophylactic measure  - DVT ppx: IMPROVE score of 0, encourage ambulation  - DIET: DASH/TLC, Carbohydrate restricted. 48F hx of sarcoidosis (dx 2010 via biopsy showing noncaseating granulomas on prednisone), seizure disorder, T2DM (A1C >15.5% in 09/2018), CKD stage 3, HTN, HLD who presents with fevers and cough a/w sepsis 2/2 influenza (RVP positive) and possibly CAP and UTI.     hospital course:     Sepsis 2/2 Influenza A with pneumonia  - tachycardic, febrile Tm 103, lactate 3.2  - RVP + influenza, CXR with bilateral opacities R > L  - 1/28 BCx- GPC TBI, UCx -NTD  - s/p Solu-medrol 125mg IV in ED, c/w home dose of Prednisone 15mg daily  - abx coverage with CTX and Azithromycin for PNA   -completed tamiflu  - CT Chest- Multiple bilateral nodular and patchy opacities scattered throughout both lungs new since October 20, 2017 are suggestive of infection. Follow-up chest CT in 1 month for resolution    Acute on chronic renal failure  - SCr 1.7 (baseline ~1.4), CKD stage 3, c/w IVF   - trend SCr, renally dose medications, avoid nephrotoxic medications.     High anion gap metabolic acidosis  - improved  -A1c =14    Sarcoidosis  - dx 2010 via biopsy showing noncaseating granulomas on prednisone  - s/p solu-medrol 125mg IV in ED   - continue with home dose of Prednisone 15mg daily, Pantoprazole 40mg daily    Seizure disorder  - c/w Phenytoin 300mg daily and Vimpat 150mg BID  - Phenytoin levels- <0.6     DM2 with chronic kidney disease, with long-term current use of insulin  - Endo house consulted  - at home on Basaglar 35 units PM, and Humalog 20 units TID   - insulin decreased per endo recs due to low glucose  - d/c solumedrol & continuing only home dose Prednisone 15mg qd  - FS and SSI qac/hs, HgA1C- 14.0% 1/29     Hypertension   - Amlodipine 10mg daily on Hold 2/2 soft BP  - hold Losartan in setting of MIKALA    Need for prophylactic measure  - DVT ppx: IMPROVE score of 0, encourage ambulation  - DIET: DASH/TLC, Carbohydrate restricted.  pt stable for d/c home, advised to f/u as outpt.

## 2019-01-31 NOTE — DISCHARGE NOTE ADULT - MEDICATION SUMMARY - MEDICATIONS TO CHANGE
I will SWITCH the dose or number of times a day I take the medications listed below when I get home from the hospital:    HumaLOG KwikPen 100 units/mL injectable solution  -- 20 unit(s) injectable 3 times a day (before meals)    Basaglar KwikPen 100 units/mL subcutaneous solution  -- 35 unit(s) subcutaneous once a day (at bedtime)

## 2019-01-31 NOTE — PROGRESS NOTE ADULT - SUBJECTIVE AND OBJECTIVE BOX
Chief Complaint: DM2    History: Patient reports decreased po intake. If she eats more it causes N/V.  + hypoglycemia symptoms earlier    MEDICATIONS  (STANDING):  atorvastatin 40 milliGRAM(s) Oral at bedtime  azithromycin  IVPB 500 milliGRAM(s) IV Intermittent every 24 hours  cefTRIAXone   IVPB 1 Gram(s) IV Intermittent every 24 hours  dextrose 50% Injectable 25 Gram(s) IV Push once  escitalopram 10 milliGRAM(s) Oral <User Schedule>  insulin glargine Injectable (LANTUS) 30 Unit(s) SubCutaneous at bedtime  insulin lispro (HumaLOG) corrective regimen sliding scale   SubCutaneous three times a day before meals  insulin lispro (HumaLOG) corrective regimen sliding scale   SubCutaneous at bedtime  insulin lispro Injectable (HumaLOG) 8 Unit(s) SubCutaneous three times a day before meals  lacosamide 150 milliGRAM(s) Oral two times a day  lactobacillus acidophilus 1 Tablet(s) Oral two times a day with meals  ondansetron Injectable 4 milliGRAM(s) IV Push once  oseltamivir 75 milliGRAM(s) Oral two times a day  pantoprazole    Tablet 40 milliGRAM(s) Oral before breakfast  phenytoin   Capsule 300 milliGRAM(s) Oral daily  potassium acid phosphate/sodium acid phosphate tablet (K-PHOS No. 2) 1 Tablet(s) Oral four times a day with meals  predniSONE   Tablet 15 milliGRAM(s) Oral daily  sodium chloride 0.9%. 1000 milliLiter(s) (75 mL/Hr) IV Continuous <Continuous>    MEDICATIONS  (PRN):  acetaminophen   Tablet .. 650 milliGRAM(s) Oral every 6 hours PRN Temp greater or equal to 38C (100.4F), Mild Pain (1 - 3), Moderate Pain (4 - 6)  dextrose 40% Gel 15 Gram(s) Oral once PRN Blood Glucose LESS THAN 70 milliGRAM(s)/deciliter      Allergies    No Known Allergies    Intolerances      Review of Systems:      ALL OTHER SYSTEMS REVIEWED AND NEGATIVE      PHYSICAL EXAM:  VITALS: T(C): 36.8 (01-31-19 @ 12:27)  T(F): 98.2 (01-31-19 @ 12:27), Max: 98.4 (01-30-19 @ 21:45)  HR: 71 (01-31-19 @ 12:27) (71 - 77)  BP: 137/86 (01-31-19 @ 12:27) (121/77 - 148/80)  RR:  (17 - 18)  SpO2:  (100% - 100%)  Wt(kg): --  GENERAL: NAD, well-groomed, well-developed  EYES: No proptosis, no lid lag, anicteric  HEENT:  Atraumatic, Normocephalic, moist mucous membranes  NEURO: extraocular movements intact, no tremor  PSYCH: Alert and oriented x 3, normal affect, normal mood      CAPILLARY BLOOD GLUCOSE      POCT Blood Glucose.: 99 mg/dL (31 Jan 2019 12:19)  POCT Blood Glucose.: 81 mg/dL (31 Jan 2019 07:17)  POCT Blood Glucose.: 87 mg/dL (30 Jan 2019 21:31)  POCT Blood Glucose.: 157 mg/dL (30 Jan 2019 17:30)      01-31    145  |  113<H>  |  16  ----------------------------<  67<L>  3.3<L>   |  21<L>  |  1.09    EGFR if : 71  EGFR if non : 61    Ca    7.8<L>      01-31  Mg     1.4     01-31  Phos  2.4     01-31    TPro  6.1  /  Alb  2.8<L>  /  TBili  < 0.2<L>  /  DBili  x   /  AST  10  /  ALT  < 4<L>  /  AlkPhos  30<L>  01-31          Thyroid Function Tests:      Hemoglobin A1C, Whole Blood: 14.0 % <H> [4.0 - 5.6] (01-29-19 @ 06:30)

## 2019-01-31 NOTE — DISCHARGE NOTE ADULT - HOME CARE AGENCY
[facility]  for RN and PT visits. Initial RN visit a day after discharge.  [facility] . Home care services to resume on day of discharge.

## 2019-01-31 NOTE — DISCHARGE NOTE ADULT - SECONDARY DIAGNOSIS.
Type 2 diabetes mellitus with chronic kidney disease, with long-term current use of insulin, unspecified CKD stage Acute on chronic renal failure UTI (urinary tract infection) Sarcoidosis Hypertension Sepsis

## 2019-01-31 NOTE — DISCHARGE NOTE ADULT - CARE PLAN
Principal Discharge DX:	Influenza A with pneumonia  Assessment and plan of treatment:	You were started on tamiflu in the hospital. IV antibiotics given. Please continue with medications as prescribed. Follow up with your PCP if symptoms do not resolve or worsen.  Please have CT chest done in 1 month for assure resolution of infection.  Secondary Diagnosis:	Sepsis  Assessment and plan of treatment:	Blood cultures have been positive. Possibly contaminated.  Secondary Diagnosis:	Type 2 diabetes mellitus with chronic kidney disease, with long-term current use of insulin, unspecified CKD stage  Assessment and plan of treatment:	Outpatient endocrine follow up with Dr. Camp.   Continue consistent carbohydrate diet.  Monitor blood glucose levels throughout the day before meals and at bedtime.  Record blood sugars and bring to outpatient providers appointment in order to be reviewed by your doctor for management modifications.  Be aware of diabetes management symptoms including feeling cool and clammy may be related to low glucose levels.  Feeling hot and dry may indicate high glucose levels.  If  you feel these symptoms, check your blood sugar.  Make regular podiatry appointments in order to have feet checked for wounds and toe nails cut by a doctor to prevent infections.  Secondary Diagnosis:	Acute on chronic renal failure  Assessment and plan of treatment:	Monitor kidney function outpatient with primary care physician.  Secondary Diagnosis:	UTI (urinary tract infection)  Assessment and plan of treatment:	Urine cultures negative.  Secondary Diagnosis:	Sarcoidosis  Assessment and plan of treatment:	Continue with prednisone 15 mg daily.  Secondary Diagnosis:	Hypertension  Assessment and plan of treatment:	Continue blood pressure medication regimen as directed. Monitor for any visual changes, headaches or dizziness.  Monitor blood pressure regularly.  Follow up with your PCP for further management for high blood pressure. Principal Discharge DX:	Influenza A with pneumonia  Goal:	Follow-u  Assessment and plan of treatment:	You were started on tamiflu in the hospital and completed the course. IV antibiotics given. Please continue with medications as prescribed (including Oral antibiotics for 3 more days). Follow up with your PCP if symptoms do not resolve or worsen.  Please have CT chest done in 1 month for assure resolution of infection.  Secondary Diagnosis:	Sepsis  Goal:	Resolved  Assessment and plan of treatment:	Blood cultures have been positive. Possibly contaminated.  Secondary Diagnosis:	Type 2 diabetes mellitus with chronic kidney disease, with long-term current use of insulin, unspecified CKD stage  Goal:	Follow-up with endocrinology as soon as possible  Assessment and plan of treatment:	Outpatient endocrine follow up with Dr. Camp as soon as possible  Reduce Lantus to 18Units at night and Humalog 5 units prior to meals  Continue consistent carbohydrate diet.  Monitor blood glucose levels throughout the day before meals and at bedtime.  Record blood sugars and bring to outpatient providers appointment in order to be reviewed by your doctor for management modifications.  Be aware of diabetes management symptoms including feeling cool and clammy may be related to low glucose levels.  Feeling hot and dry may indicate high glucose levels.  If  you feel these symptoms, check your blood sugar.  Make regular podiatry appointments in order to have feet checked for wounds and toe nails cut by a doctor to prevent infections.  Secondary Diagnosis:	Acute on chronic renal failure  Assessment and plan of treatment:	Monitor kidney function outpatient with primary care physician.  Secondary Diagnosis:	UTI (urinary tract infection)  Assessment and plan of treatment:	Urine cultures negative.  Secondary Diagnosis:	Sarcoidosis  Assessment and plan of treatment:	Continue with prednisone 15 mg daily.  Secondary Diagnosis:	Hypertension  Assessment and plan of treatment:	Continue blood pressure medication regimen as directed. Monitor for any visual changes, headaches or dizziness.  Monitor blood pressure regularly.  Follow up with your PCP for further management for high blood pressure.

## 2019-01-31 NOTE — PROGRESS NOTE ADULT - PROBLEM SELECTOR PLAN 6
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

## 2019-01-31 NOTE — PROGRESS NOTE ADULT - PROBLEM SELECTOR PLAN 5
- pt. c/o dysuria, UA with blood and bacteria, however negative for leukocyte esterase and nitrites   - urine cultures pending, covering for UTI with CTX as pt. on it for CAP as above - pt. c/o dysuria, UA with blood and bacteria, however negative for leukocyte esterase and nitrites   - urine cultures neg, covering for UTI with CTX as pt. on it for CAP as above

## 2019-01-31 NOTE — PROGRESS NOTE ADULT - PROBLEM SELECTOR PLAN 1
Hypoglycemia today due to decreased PO intake.  Decrease Humalog to 7/7/7  Decrease Lantus to 25 units qhs  BG target 100-200 mg/dl.  DC on basal bolus regimen TBD.  Outpatient endocrine follow up with Dr. Camp.

## 2019-01-31 NOTE — PROGRESS NOTE ADULT - SUBJECTIVE AND OBJECTIVE BOX
Patient is a 48y old  Female who presents with a chief complaint of influenza A, PNA (31 Jan 2019 15:54)      SUBJECTIVE / OVERNIGHT EVENTS: Pt seen and examined at 12:30pm, overnight/early am events noted ( low glucose)  pt reports that her po intake is low as they serve pork and things that she does not eat, cough and sob better, no chest pain no other complaints.        MEDICATIONS  (STANDING):  atorvastatin 40 milliGRAM(s) Oral at bedtime  azithromycin  IVPB 500 milliGRAM(s) IV Intermittent every 24 hours  cefTRIAXone   IVPB 1 Gram(s) IV Intermittent every 24 hours  dextrose 50% Injectable 25 Gram(s) IV Push once  escitalopram 10 milliGRAM(s) Oral <User Schedule>  insulin glargine Injectable (LANTUS) 25 Unit(s) SubCutaneous at bedtime  insulin lispro (HumaLOG) corrective regimen sliding scale   SubCutaneous three times a day before meals  insulin lispro (HumaLOG) corrective regimen sliding scale   SubCutaneous at bedtime  insulin lispro Injectable (HumaLOG) 7 Unit(s) SubCutaneous three times a day before meals  lacosamide 150 milliGRAM(s) Oral two times a day  lactobacillus acidophilus 1 Tablet(s) Oral two times a day with meals  ondansetron Injectable 4 milliGRAM(s) IV Push once  oseltamivir 75 milliGRAM(s) Oral two times a day  pantoprazole    Tablet 40 milliGRAM(s) Oral before breakfast  phenytoin   Capsule 300 milliGRAM(s) Oral daily  potassium acid phosphate/sodium acid phosphate tablet (K-PHOS No. 2) 1 Tablet(s) Oral four times a day with meals  predniSONE   Tablet 15 milliGRAM(s) Oral daily  sodium chloride 0.9%. 1000 milliLiter(s) (75 mL/Hr) IV Continuous <Continuous>    MEDICATIONS  (PRN):  acetaminophen   Tablet .. 650 milliGRAM(s) Oral every 6 hours PRN Temp greater or equal to 38C (100.4F), Mild Pain (1 - 3), Moderate Pain (4 - 6)  dextrose 40% Gel 15 Gram(s) Oral once PRN Blood Glucose LESS THAN 70 milliGRAM(s)/deciliter      Vital Signs Last 24 Hrs  T(C): 36.8 (31 Jan 2019 12:27), Max: 36.9 (30 Jan 2019 21:45)  T(F): 98.2 (31 Jan 2019 12:27), Max: 98.4 (30 Jan 2019 21:45)  HR: 71 (31 Jan 2019 12:27) (71 - 77)  BP: 137/86 (31 Jan 2019 12:27) (121/77 - 148/80)  BP(mean): --  RR: 18 (31 Jan 2019 12:27) (17 - 18)  SpO2: 100% (31 Jan 2019 12:27) (100% - 100%)  CAPILLARY BLOOD GLUCOSE      POCT Blood Glucose.: 87 mg/dL (31 Jan 2019 17:25)  POCT Blood Glucose.: 99 mg/dL (31 Jan 2019 12:19)  POCT Blood Glucose.: 81 mg/dL (31 Jan 2019 07:17)  POCT Blood Glucose.: 87 mg/dL (30 Jan 2019 21:31)    I&O's Summary      PHYSICAL EXAM:  GENERAL: NAD, well-developed  CHEST/LUNG: Rt base with crackles, left clear to auscultation  HEART: Regular rate and rhythm  ABDOMEN: Soft, Nontender, Nondistended  EXTREMITIES: No LE edema  PSYCH: Calm  NEUROLOGY: AAOx3  SKIN: No rashes or lesions    LABS:                        9.8    6.78  )-----------( 355      ( 31 Jan 2019 06:30 )             30.9     01-31    145  |  113<H>  |  16  ----------------------------<  67<L>  3.3<L>   |  21<L>  |  1.09    Ca    7.8<L>      31 Jan 2019 06:30  Phos  2.4     01-31  Mg     1.4     01-31    TPro  6.1  /  Alb  2.8<L>  /  TBili  < 0.2<L>  /  DBili  x   /  AST  10  /  ALT  < 4<L>  /  AlkPhos  30<L>  01-31              RADIOLOGY & ADDITIONAL TESTS:    Imaging Personally Reviewed:    Consultant(s) Notes Reviewed:      Care Discussed with Consultants/Other Providers:

## 2019-01-31 NOTE — DISCHARGE NOTE ADULT - PATIENT PORTAL LINK FT
You can access the [facility] Patient Portal, offered by [facility], by registering with the following website: [email or web address]

## 2019-01-31 NOTE — PROGRESS NOTE ADULT - PROBLEM SELECTOR PLAN 1
- p/w fever and productive cough, RVP positive for influenza A, CXR with bilateral patchy opacities R>L, CT confirms new multiple bilateral opacities c/w pneumonia  - increased dose of Tamiflu 75mg BID as renal function has improved, c/w CTX and azithromycin for CAP coverage, Improving on current abx  - droplet precautions, tylenol PRN fever, c/w IVF  ID consulted, appreciate consult, f/u legionella and f/u sputum culture  d/c planning for tomorrow if stable

## 2019-01-31 NOTE — PROGRESS NOTE ADULT - SUBJECTIVE AND OBJECTIVE BOX
LEONEL BEVERLY 48y [id number]    Patient is a 48y old  Female who presents with a chief complaint of influenza A, PNA (30 Jan 2019 17:50)      Follow Up/CC:  ID following for flu    Interval History/ROS: feels better, no fever    Allergies    No Known Allergies    Intolerances        ANTIMICROBIALS:  azithromycin  IVPB 500 every 24 hours  cefTRIAXone   IVPB 1 every 24 hours  oseltamivir 75 two times a day      MEDICATIONS  (STANDING):  atorvastatin 40 milliGRAM(s) Oral at bedtime  azithromycin  IVPB 500 milliGRAM(s) IV Intermittent every 24 hours  cefTRIAXone   IVPB 1 Gram(s) IV Intermittent every 24 hours  dextrose 50% Injectable 25 Gram(s) IV Push once  escitalopram 10 milliGRAM(s) Oral <User Schedule>  insulin glargine Injectable (LANTUS) 30 Unit(s) SubCutaneous at bedtime  insulin lispro (HumaLOG) corrective regimen sliding scale   SubCutaneous three times a day before meals  insulin lispro (HumaLOG) corrective regimen sliding scale   SubCutaneous at bedtime  insulin lispro Injectable (HumaLOG) 8 Unit(s) SubCutaneous three times a day before meals  lacosamide 150 milliGRAM(s) Oral two times a day  lactobacillus acidophilus 1 Tablet(s) Oral two times a day with meals  ondansetron Injectable 4 milliGRAM(s) IV Push once  oseltamivir 75 milliGRAM(s) Oral two times a day  pantoprazole    Tablet 40 milliGRAM(s) Oral before breakfast  phenytoin   Capsule 300 milliGRAM(s) Oral daily  potassium acid phosphate/sodium acid phosphate tablet (K-PHOS No. 2) 1 Tablet(s) Oral four times a day with meals  predniSONE   Tablet 15 milliGRAM(s) Oral daily  sodium chloride 0.9%. 1000 milliLiter(s) (75 mL/Hr) IV Continuous <Continuous>    MEDICATIONS  (PRN):  acetaminophen   Tablet .. 650 milliGRAM(s) Oral every 6 hours PRN Temp greater or equal to 38C (100.4F), Mild Pain (1 - 3), Moderate Pain (4 - 6)  dextrose 40% Gel 15 Gram(s) Oral once PRN Blood Glucose LESS THAN 70 milliGRAM(s)/deciliter        Vital Signs Last 24 Hrs  T(C): 36.8 (31 Jan 2019 12:27), Max: 36.9 (30 Jan 2019 21:45)  T(F): 98.2 (31 Jan 2019 12:27), Max: 98.4 (30 Jan 2019 21:45)  HR: 71 (31 Jan 2019 12:27) (71 - 77)  BP: 137/86 (31 Jan 2019 12:27) (121/77 - 148/80)  BP(mean): --  RR: 18 (31 Jan 2019 12:27) (17 - 18)  SpO2: 100% (31 Jan 2019 12:27) (100% - 100%)    CBC Full  -  ( 31 Jan 2019 06:30 )  WBC Count : 6.78 K/uL  Hemoglobin : 9.8 g/dL  Hematocrit : 30.9 %  Platelet Count - Automated : 355 K/uL  Mean Cell Volume : 84.0 fL  Mean Cell Hemoglobin : 26.6 pg  Mean Cell Hemoglobin Concentration : 31.7 %  Auto Neutrophil # : 4.72 K/uL  Auto Lymphocyte # : 1.31 K/uL  Auto Monocyte # : 0.52 K/uL  Auto Eosinophil # : 0.03 K/uL  Auto Basophil # : 0.01 K/uL  Auto Neutrophil % : 69.7 %  Auto Lymphocyte % : 19.3 %  Auto Monocyte % : 7.7 %  Auto Eosinophil % : 0.4 %  Auto Basophil % : 0.1 %    01-31    145  |  113<H>  |  16  ----------------------------<  67<L>  3.3<L>   |  21<L>  |  1.09    Ca    7.8<L>      31 Jan 2019 06:30  Phos  2.4     01-31  Mg     1.4     01-31    TPro  6.1  /  Alb  2.8<L>  /  TBili  < 0.2<L>  /  DBili  x   /  AST  10  /  ALT  < 4<L>  /  AlkPhos  30<L>  01-31    LIVER FUNCTIONS - ( 31 Jan 2019 06:30 )  Alb: 2.8 g/dL / Pro: 6.1 g/dL / ALK PHOS: 30 u/L / ALT: < 4 u/L / AST: 10 u/L / GGT: x               MICROBIOLOGY:  SPUTUM  01-30-19 --  --  --      BLOOD PERIPHERAL  01-30-19 --  --  --      URINE MIDSTREAM  01-28-19 --  --  --      BLOOD PERIPHERAL  01-28-19 --  --  BLOOD CULTURE PCR  Staphylococcus sp.,coag neg      RADIOLOGY    < from: CT Chest No Cont (01.28.19 @ 22:09) >  Multiple bilateral nodular and patchy opacities scattered   throughout both lungs new since October 20, 2017 are suggestive of   infection. One month follow-up chest CT is recommended to ensure   resolution.    Bilateral peripheral lower lung cysts are predominantly unchanged since   October 20, 2017.    Bilateral axillary enlarged lymph nodes with overall interval improvement   and decrease in size since October 20, 2017.    < end of copied text >

## 2019-01-31 NOTE — PROGRESS NOTE ADULT - PROBLEM SELECTOR PLAN 4
- SCr 1.7 (baseline ~1.4), CKD stage 3 - likely pre-renal in setting of sepsis, poor PO intake, and GI losses (n/v/d), s/p 2L bolus in ED, c/w IVF   - trend SCr, creatinine improving, avoid nephrotoxic medications

## 2019-01-31 NOTE — DISCHARGE NOTE ADULT - PLAN OF CARE
Blood cultures have been positive. Possibly contaminated. Outpatient endocrine follow up with Dr. Camp.   Continue consistent carbohydrate diet.  Monitor blood glucose levels throughout the day before meals and at bedtime.  Record blood sugars and bring to outpatient providers appointment in order to be reviewed by your doctor for management modifications.  Be aware of diabetes management symptoms including feeling cool and clammy may be related to low glucose levels.  Feeling hot and dry may indicate high glucose levels.  If  you feel these symptoms, check your blood sugar.  Make regular podiatry appointments in order to have feet checked for wounds and toe nails cut by a doctor to prevent infections. Monitor kidney function outpatient with primary care physician. Urine cultures negative. Continue with prednisone 15 mg daily. You were started on tamiflu in the hospital. IV antibiotics given. Please continue with medications as prescribed. Follow up with your PCP if symptoms do not resolve or worsen.  Please have CT chest done in 1 month for assure resolution of infection. Continue blood pressure medication regimen as directed. Monitor for any visual changes, headaches or dizziness.  Monitor blood pressure regularly.  Follow up with your PCP for further management for high blood pressure. Follow-u You were started on tamiflu in the hospital and completed the course. IV antibiotics given. Please continue with medications as prescribed (including Oral antibiotics for 3 more days). Follow up with your PCP if symptoms do not resolve or worsen.  Please have CT chest done in 1 month for assure resolution of infection. Resolved Follow-up with endocrinology as soon as possible Outpatient endocrine follow up with Dr. Camp as soon as possible  Reduce Lantus to 18Units at night and Humalog 5 units prior to meals  Continue consistent carbohydrate diet.  Monitor blood glucose levels throughout the day before meals and at bedtime.  Record blood sugars and bring to outpatient providers appointment in order to be reviewed by your doctor for management modifications.  Be aware of diabetes management symptoms including feeling cool and clammy may be related to low glucose levels.  Feeling hot and dry may indicate high glucose levels.  If  you feel these symptoms, check your blood sugar.  Make regular podiatry appointments in order to have feet checked for wounds and toe nails cut by a doctor to prevent infections.

## 2019-01-31 NOTE — PROGRESS NOTE ADULT - ASSESSMENT
ASSESSMENT:  49yo F, PMHx sarcoidosis (diagnosed in 2010 via biopsy, on chronic prednisone), seizure disorder (on phenytoin and vimpat), DM2 (HgbA1C 14%), CKD stage 3, HTN, HLD, admitted for sepsis secondary to influenzae A. Endocrine consulted for hyperglycemia in the setting of chronic steroid use.

## 2019-02-01 VITALS
HEART RATE: 78 BPM | OXYGEN SATURATION: 100 % | TEMPERATURE: 98 F | RESPIRATION RATE: 18 BRPM | DIASTOLIC BLOOD PRESSURE: 70 MMHG | SYSTOLIC BLOOD PRESSURE: 147 MMHG

## 2019-02-01 LAB
ALBUMIN SERPL ELPH-MCNC: 3.4 G/DL — SIGNIFICANT CHANGE UP (ref 3.3–5)
ALP SERPL-CCNC: 38 U/L — LOW (ref 40–120)
ALT FLD-CCNC: 5 U/L — SIGNIFICANT CHANGE UP (ref 4–33)
ANION GAP SERPL CALC-SCNC: 16 MMO/L — HIGH (ref 7–14)
AST SERPL-CCNC: 16 U/L — SIGNIFICANT CHANGE UP (ref 4–32)
BILIRUB SERPL-MCNC: 0.2 MG/DL — SIGNIFICANT CHANGE UP (ref 0.2–1.2)
BUN SERPL-MCNC: 9 MG/DL — SIGNIFICANT CHANGE UP (ref 7–23)
CALCIUM SERPL-MCNC: 8.7 MG/DL — SIGNIFICANT CHANGE UP (ref 8.4–10.5)
CHLORIDE SERPL-SCNC: 107 MMOL/L — SIGNIFICANT CHANGE UP (ref 98–107)
CO2 SERPL-SCNC: 20 MMOL/L — LOW (ref 22–31)
CREAT SERPL-MCNC: 0.92 MG/DL — SIGNIFICANT CHANGE UP (ref 0.5–1.3)
GLUCOSE BLDC GLUCOMTR-MCNC: 110 MG/DL — HIGH (ref 70–99)
GLUCOSE BLDC GLUCOMTR-MCNC: 139 MG/DL — HIGH (ref 70–99)
GLUCOSE BLDC GLUCOMTR-MCNC: 93 MG/DL — SIGNIFICANT CHANGE UP (ref 70–99)
GLUCOSE SERPL-MCNC: 52 MG/DL — LOW (ref 70–99)
MAGNESIUM SERPL-MCNC: 1.3 MG/DL — LOW (ref 1.6–2.6)
PHOSPHATE SERPL-MCNC: 2.3 MG/DL — LOW (ref 2.5–4.5)
POTASSIUM SERPL-MCNC: 3.4 MMOL/L — LOW (ref 3.5–5.3)
POTASSIUM SERPL-SCNC: 3.4 MMOL/L — LOW (ref 3.5–5.3)
PROT SERPL-MCNC: 7.1 G/DL — SIGNIFICANT CHANGE UP (ref 6–8.3)
SODIUM SERPL-SCNC: 143 MMOL/L — SIGNIFICANT CHANGE UP (ref 135–145)

## 2019-02-01 PROCEDURE — 99232 SBSQ HOSP IP/OBS MODERATE 35: CPT

## 2019-02-01 PROCEDURE — 99239 HOSP IP/OBS DSCHRG MGMT >30: CPT

## 2019-02-01 RX ORDER — INSULIN LISPRO 100/ML
5 VIAL (ML) SUBCUTANEOUS
Qty: 0 | Refills: 0 | DISCHARGE
Start: 2019-02-01

## 2019-02-01 RX ORDER — INSULIN LISPRO 100/ML
5 VIAL (ML) SUBCUTANEOUS
Qty: 0 | Refills: 0 | Status: DISCONTINUED | OUTPATIENT
Start: 2019-02-01 | End: 2019-02-01

## 2019-02-01 RX ORDER — INSULIN LISPRO 100/ML
20 VIAL (ML) SUBCUTANEOUS
Qty: 0 | Refills: 0 | COMMUNITY

## 2019-02-01 RX ORDER — POTASSIUM PHOSPHATE, MONOBASIC POTASSIUM PHOSPHATE, DIBASIC 236; 224 MG/ML; MG/ML
15 INJECTION, SOLUTION INTRAVENOUS ONCE
Qty: 0 | Refills: 0 | Status: COMPLETED | OUTPATIENT
Start: 2019-02-01 | End: 2019-02-01

## 2019-02-01 RX ORDER — INSULIN GLARGINE 100 [IU]/ML
34 INJECTION, SOLUTION SUBCUTANEOUS
Qty: 0 | Refills: 0 | DISCHARGE
Start: 2019-02-01

## 2019-02-01 RX ORDER — INSULIN GLARGINE 100 [IU]/ML
35 INJECTION, SOLUTION SUBCUTANEOUS
Qty: 0 | Refills: 0 | COMMUNITY

## 2019-02-01 RX ORDER — INSULIN GLARGINE 100 [IU]/ML
18 INJECTION, SOLUTION SUBCUTANEOUS AT BEDTIME
Qty: 0 | Refills: 0 | Status: DISCONTINUED | OUTPATIENT
Start: 2019-02-01 | End: 2019-02-01

## 2019-02-01 RX ORDER — INSULIN GLARGINE 100 [IU]/ML
18 INJECTION, SOLUTION SUBCUTANEOUS
Qty: 0 | Refills: 0 | DISCHARGE
Start: 2019-02-01

## 2019-02-01 RX ORDER — MAGNESIUM SULFATE 500 MG/ML
2 VIAL (ML) INJECTION ONCE
Qty: 0 | Refills: 0 | Status: COMPLETED | OUTPATIENT
Start: 2019-02-01 | End: 2019-02-01

## 2019-02-01 RX ORDER — POTASSIUM CHLORIDE 20 MEQ
40 PACKET (EA) ORAL ONCE
Qty: 0 | Refills: 0 | Status: COMPLETED | OUTPATIENT
Start: 2019-02-01 | End: 2019-02-01

## 2019-02-01 RX ADMIN — Medication 1 TABLET(S): at 08:57

## 2019-02-01 RX ADMIN — PANTOPRAZOLE SODIUM 40 MILLIGRAM(S): 20 TABLET, DELAYED RELEASE ORAL at 06:01

## 2019-02-01 RX ADMIN — Medication 50 GRAM(S): at 12:19

## 2019-02-01 RX ADMIN — Medication 5 UNIT(S): at 12:26

## 2019-02-01 RX ADMIN — POTASSIUM PHOSPHATE, MONOBASIC POTASSIUM PHOSPHATE, DIBASIC 62.5 MILLIMOLE(S): 236; 224 INJECTION, SOLUTION INTRAVENOUS at 12:18

## 2019-02-01 RX ADMIN — ESCITALOPRAM OXALATE 10 MILLIGRAM(S): 10 TABLET, FILM COATED ORAL at 06:01

## 2019-02-01 RX ADMIN — Medication 40 MILLIEQUIVALENT(S): at 12:21

## 2019-02-01 RX ADMIN — LACOSAMIDE 150 MILLIGRAM(S): 50 TABLET ORAL at 06:02

## 2019-02-01 RX ADMIN — Medication 15 MILLIGRAM(S): at 06:01

## 2019-02-01 RX ADMIN — Medication 75 MILLIGRAM(S): at 06:02

## 2019-02-01 RX ADMIN — Medication 300 MILLIGRAM(S): at 12:18

## 2019-02-01 NOTE — PROGRESS NOTE ADULT - SUBJECTIVE AND OBJECTIVE BOX
LEONEL BEVERLY 48y [id number]    Patient is a 48y old  Female who presents with a chief complaint of influenza A, PNA (01 Feb 2019 10:54)      Follow Up/CC:  ID following for flu    Interval History/ROS: feels well, no fever    Allergies    No Known Allergies    Intolerances        ANTIMICROBIALS:  levoFLOXacin  Tablet 500 every 24 hours  oseltamivir 75 two times a day      MEDICATIONS  (STANDING):  atorvastatin 40 milliGRAM(s) Oral at bedtime  dextrose 50% Injectable 25 Gram(s) IV Push once  escitalopram 10 milliGRAM(s) Oral <User Schedule>  insulin glargine Injectable (LANTUS) 18 Unit(s) SubCutaneous at bedtime  insulin lispro (HumaLOG) corrective regimen sliding scale   SubCutaneous three times a day before meals  insulin lispro (HumaLOG) corrective regimen sliding scale   SubCutaneous at bedtime  insulin lispro Injectable (HumaLOG) 5 Unit(s) SubCutaneous three times a day before meals  lacosamide 150 milliGRAM(s) Oral two times a day  lactobacillus acidophilus 1 Tablet(s) Oral two times a day with meals  levoFLOXacin  Tablet 500 milliGRAM(s) Oral every 24 hours  ondansetron Injectable 4 milliGRAM(s) IV Push once  oseltamivir 75 milliGRAM(s) Oral two times a day  pantoprazole    Tablet 40 milliGRAM(s) Oral before breakfast  phenytoin   Capsule 300 milliGRAM(s) Oral daily  predniSONE   Tablet 15 milliGRAM(s) Oral daily    MEDICATIONS  (PRN):  acetaminophen   Tablet .. 650 milliGRAM(s) Oral every 6 hours PRN Temp greater or equal to 38C (100.4F), Mild Pain (1 - 3), Moderate Pain (4 - 6)  dextrose 40% Gel 15 Gram(s) Oral once PRN Blood Glucose LESS THAN 70 milliGRAM(s)/deciliter        Vital Signs Last 24 Hrs  T(C): 36.7 (01 Feb 2019 12:00), Max: 37.1 (31 Jan 2019 20:49)  T(F): 98 (01 Feb 2019 12:00), Max: 98.7 (31 Jan 2019 20:49)  HR: 78 (01 Feb 2019 12:00) (77 - 79)  BP: 147/70 (01 Feb 2019 12:00) (126/80 - 172/90)  BP(mean): --  RR: 18 (01 Feb 2019 12:00) (18 - 18)  SpO2: 100% (01 Feb 2019 12:00) (100% - 100%)    CBC Full  -  ( 31 Jan 2019 06:30 )  WBC Count : 6.78 K/uL  Hemoglobin : 9.8 g/dL  Hematocrit : 30.9 %  Platelet Count - Automated : 355 K/uL  Mean Cell Volume : 84.0 fL  Mean Cell Hemoglobin : 26.6 pg  Mean Cell Hemoglobin Concentration : 31.7 %  Auto Neutrophil # : 4.72 K/uL  Auto Lymphocyte # : 1.31 K/uL  Auto Monocyte # : 0.52 K/uL  Auto Eosinophil # : 0.03 K/uL  Auto Basophil # : 0.01 K/uL  Auto Neutrophil % : 69.7 %  Auto Lymphocyte % : 19.3 %  Auto Monocyte % : 7.7 %  Auto Eosinophil % : 0.4 %  Auto Basophil % : 0.1 %    02-01    143  |  107  |  9   ----------------------------<  52<L>  3.4<L>   |  20<L>  |  0.92    Ca    8.7      01 Feb 2019 05:44  Phos  2.3     02-01  Mg     1.3     02-01    TPro  7.1  /  Alb  3.4  /  TBili  0.2  /  DBili  x   /  AST  16  /  ALT  5   /  AlkPhos  38<L>  02-01    LIVER FUNCTIONS - ( 01 Feb 2019 05:44 )  Alb: 3.4 g/dL / Pro: 7.1 g/dL / ALK PHOS: 38 u/L / ALT: 5 u/L / AST: 16 u/L / GGT: x               MICROBIOLOGY:  SPUTUM  01-30-19 --  --  --      BLOOD PERIPHERAL  01-30-19 --  --  --      URINE MIDSTREAM  01-28-19 --  --  --      BLOOD PERIPHERAL  01-28-19 --  --  BLOOD CULTURE PCR  Staphylococcus sp.,coag neg          RADIOLOGY    < from: CT Chest No Cont (01.28.19 @ 22:09) >   Multiple bilateral nodular and patchy opacities scattered   throughout both lungs new since October 20, 2017 are suggestive of   infection. One month follow-up chest CT is recommended to ensure   resolution.    Bilateral peripheral lower lung cysts are predominantly unchanged since   October 20, 2017.    Bilateral axillary enlarged lymph nodes with overall interval improvement   and decrease in size since October 20, 2017.    < end of copied text >

## 2019-02-01 NOTE — PROGRESS NOTE ADULT - ATTENDING COMMENTS
Agree with plan as outlined.
Yusuf Stanford  Attending Physician   Division of Infectious Disease  Pager #[phone]  After 5pm/weekend or no response, call #[phone]
PT rec home PT, has CDP[employer] aide at home
Yusuf Stanford  Attending Physician   Division of Infectious Disease  Pager #[phone]  After 5pm/weekend or no response, call #[phone]

## 2019-02-01 NOTE — PROGRESS NOTE ADULT - PROBLEM SELECTOR PLAN 2
sepsis d/t above, being treated as ntoed  - pt. on chronic steroids at home, s/p solu-medrol 125mg IV in ED, however, pt. not hypotensive prior to solu-medrol, and currrently maintaining blood pressures, thus will continue with home dose of prednisone 15mg daily, if becomes hypotensive resume stress dose steroids
sepsis d/t above, being treated as ntoed  - pt. on chronic steroids at home, s/p solu-medrol 125mg IV in ED, however, pt. not hypotensive prior to solu-medrol, and currrently maintaining blood pressures, thus will continue with home dose of prednisone 15mg daily, if becomes hypotensive resume stress dose steroids  Blood culture 1 bottle coag neg staph likely contaminant
-from flu/PNA  -antitussives per primary team
-from flu/PNA  -antitussives per primary team

## 2019-02-01 NOTE — PROGRESS NOTE ADULT - RS GEN PE MLT RESP DETAILS PC
clear to auscultation bilaterally/respirations non-labored
respirations non-labored/clear to auscultation bilaterally/good air movement

## 2019-02-01 NOTE — PROGRESS NOTE ADULT - PROBLEM SELECTOR PLAN 5
- pt. c/o dysuria, UA with blood and bacteria, however negative for leukocyte esterase and nitrites   - urine cultures neg, covering for UTI with CTX as pt. on it for CAP as above

## 2019-02-01 NOTE — PROGRESS NOTE ADULT - PROBLEM SELECTOR PLAN 3
-better  -cont to trend
uncontrolled DM - Hb A1c 14  - at home: Basaglar 35 units PM, and Humalog 20 units TID, reports not taking for past 3 weeks --> s/p lantus 28 last night - remains very elevated, cont lantus 35 units and premeal 12 units per endo recs
uncontrolled DM - Hb A1c 14  - at home: Basaglar 35 units PM, and Humalog 20 units TID, reports not taking for past 3 weeks --> s/p lantus 28 last night - remains very elevated, repeat BMP with VBG, start lantus 38/humalog 12 tid, f/u endo input
uncontrolled DM - Hb A1c 14  - at home: Basaglar 35 units PM, and Humalog 20 units TID, reports not taking for past 3 weeks -->   insulin doses adjusted by endo due to low glucose
uncontrolled DM - Hb A1c 14  - at home: Basaglar 35 units PM, and Humalog 20 units TID, reports not taking for past 3 weeks -->   insulin doses adjusted by endo due to low glucose
-better  -cont to trend

## 2019-02-01 NOTE — PROGRESS NOTE ADULT - PROBLEM SELECTOR PROBLEM 3
Type 2 diabetes mellitus with chronic kidney disease, with long-term current use of insulin, unspecified CKD stage
Fever due to infection
Fever due to infection

## 2019-02-01 NOTE — PROGRESS NOTE ADULT - PROVIDER SPECIALTY LIST ADULT
Endocrinology
Hospitalist
Infectious Disease
Hospitalist
Infectious Disease

## 2019-02-01 NOTE — PROGRESS NOTE ADULT - REASON FOR ADMISSION
influenza A, PNA

## 2019-02-01 NOTE — PROGRESS NOTE ADULT - GASTROINTESTINAL DETAILS
no distention/no guarding/soft/nontender/no rebound tenderness/no rigidity
no rebound tenderness/no rigidity/nontender/soft/no distention/no guarding

## 2019-02-01 NOTE — PROGRESS NOTE ADULT - PROBLEM SELECTOR PROBLEM 1
Influenza A with pneumonia
Uncontrolled type 2 diabetes mellitus with hyperglycemia
Uncontrolled type 2 diabetes mellitus with hyperglycemia
Influenza A with pneumonia

## 2019-02-01 NOTE — PROGRESS NOTE ADULT - SUBJECTIVE AND OBJECTIVE BOX
Chief Complaint: DM2    History: Reports feeling better overall. Endorses hypoglycemia symptoms (mainly lightheadedness) when her BG < 200. Reports decreased PO intake while in hospital (consumes < 25% of meals) due to dislike of hospital food, as well as sore throat.    MEDICATIONS  (STANDING):  atorvastatin 40 milliGRAM(s) Oral at bedtime  azithromycin  IVPB 500 milliGRAM(s) IV Intermittent every 24 hours  cefTRIAXone   IVPB 1 Gram(s) IV Intermittent every 24 hours  dextrose 50% Injectable 25 Gram(s) IV Push once  escitalopram 10 milliGRAM(s) Oral <User Schedule>  insulin glargine Injectable (LANTUS) 25 Unit(s) SubCutaneous at bedtime  insulin lispro (HumaLOG) corrective regimen sliding scale   SubCutaneous three times a day before meals  insulin lispro (HumaLOG) corrective regimen sliding scale   SubCutaneous at bedtime  insulin lispro Injectable (HumaLOG) 7 Unit(s) SubCutaneous three times a day before meals  lacosamide 150 milliGRAM(s) Oral two times a day  lactobacillus acidophilus 1 Tablet(s) Oral two times a day with meals  magnesium sulfate  IVPB 2 Gram(s) IV Intermittent once  ondansetron Injectable 4 milliGRAM(s) IV Push once  oseltamivir 75 milliGRAM(s) Oral two times a day  pantoprazole    Tablet 40 milliGRAM(s) Oral before breakfast  phenytoin   Capsule 300 milliGRAM(s) Oral daily  potassium chloride    Tablet ER 40 milliEquivalent(s) Oral once  potassium phosphate IVPB 15 milliMole(s) IV Intermittent once  predniSONE   Tablet 15 milliGRAM(s) Oral daily    MEDICATIONS  (PRN):  acetaminophen   Tablet .. 650 milliGRAM(s) Oral every 6 hours PRN Temp greater or equal to 38C (100.4F), Mild Pain (1 - 3), Moderate Pain (4 - 6)  dextrose 40% Gel 15 Gram(s) Oral once PRN Blood Glucose LESS THAN 70 milliGRAM(s)/deciliter      Allergies  No Known Allergies      Review of Systems:  Constitutional: No fever  Eyes: No blurry vision  Neuro: No tremors  HEENT: No pain  Cardiovascular: No chest pain, palpitations  Respiratory: No SOB, no cough  GI: No nausea, vomiting, abdominal pain  : No dysuria  Skin: no rash  Psych: no depression  Endocrine: no polyuria, polydipsia  Hem/lymph: no swelling  Osteoporosis: no fractures    ALL OTHER SYSTEMS REVIEWED AND NEGATIVE    UNABLE TO OBTAIN    PHYSICAL EXAM:  VITALS: T(C): 36.7 (02-01-19 @ 05:10)  T(F): 98.1 (02-01-19 @ 05:10), Max: 98.7 (01-31-19 @ 20:49)  HR: 77 (02-01-19 @ 05:10) (71 - 79)  BP: 126/80 (02-01-19 @ 05:40) (126/80 - 172/90)  RR:  (18 - 18)  SpO2:  (100% - 100%)  Wt(kg): --  GENERAL: NAD  EYES: No proptosis, no lid lag, anicteric  HEENT:  Atraumatic, Normocephalic, moist mucous membranes  GI: Soft, nontender, non distended  SKIN: Dry, intact, No rashes or lesions  NEURO: extraocular movements intact, no tremor  PSYCH: Alert and oriented x 3, normal affect, normal mood      CAPILLARY BLOOD GLUCOSE      POCT Blood Glucose.: 110 mg/dL (01 Feb 2019 07:43)  POCT Blood Glucose.: 125 mg/dL (31 Jan 2019 22:54)  POCT Blood Glucose.: 67 mg/dL (31 Jan 2019 22:26)  POCT Blood Glucose.: 87 mg/dL (31 Jan 2019 17:25)  POCT Blood Glucose.: 99 mg/dL (31 Jan 2019 12:19)      02-01    143  |  107  |  9   ----------------------------<  52<L>  3.4<L>   |  20<L>  |  0.92    EGFR if : 87  EGFR if non : 75    Ca    8.7      02-01  Mg     1.3     02-01  Phos  2.3     02-01    TPro  7.1  /  Alb  3.4  /  TBili  0.2  /  DBili  x   /  AST  16  /  ALT  5   /  AlkPhos  38<L>  02-01          Thyroid Function Tests:      Hemoglobin A1C, Whole Blood: 14.0 % <H> [4.0 - 5.6] (01-29-19 @ 06:30) Chief Complaint: DM2    History: Reports feeling better overall. Endorses hypoglycemia symptoms (mainly lightheadedness) when her BG < 200. Reports decreased PO intake while in hospital (consumes < 25% of meals) due to dislike of hospital food, as well as sore throat.    MEDICATIONS  (STANDING):  atorvastatin 40 milliGRAM(s) Oral at bedtime  azithromycin  IVPB 500 milliGRAM(s) IV Intermittent every 24 hours  cefTRIAXone   IVPB 1 Gram(s) IV Intermittent every 24 hours  dextrose 50% Injectable 25 Gram(s) IV Push once  escitalopram 10 milliGRAM(s) Oral <User Schedule>  insulin glargine Injectable (LANTUS) 25 Unit(s) SubCutaneous at bedtime  insulin lispro (HumaLOG) corrective regimen sliding scale   SubCutaneous three times a day before meals  insulin lispro (HumaLOG) corrective regimen sliding scale   SubCutaneous at bedtime  insulin lispro Injectable (HumaLOG) 7 Unit(s) SubCutaneous three times a day before meals  lacosamide 150 milliGRAM(s) Oral two times a day  lactobacillus acidophilus 1 Tablet(s) Oral two times a day with meals  magnesium sulfate  IVPB 2 Gram(s) IV Intermittent once  ondansetron Injectable 4 milliGRAM(s) IV Push once  oseltamivir 75 milliGRAM(s) Oral two times a day  pantoprazole    Tablet 40 milliGRAM(s) Oral before breakfast  phenytoin   Capsule 300 milliGRAM(s) Oral daily  potassium chloride    Tablet ER 40 milliEquivalent(s) Oral once  potassium phosphate IVPB 15 milliMole(s) IV Intermittent once  predniSONE   Tablet 15 milliGRAM(s) Oral daily    MEDICATIONS  (PRN):  acetaminophen   Tablet .. 650 milliGRAM(s) Oral every 6 hours PRN Temp greater or equal to 38C (100.4F), Mild Pain (1 - 3), Moderate Pain (4 - 6)  dextrose 40% Gel 15 Gram(s) Oral once PRN Blood Glucose LESS THAN 70 milliGRAM(s)/deciliter      Allergies  No Known Allergies      Review of Systems:  Constitutional: No fever  Eyes: No blurry vision  Neuro: No tremors  HEENT: No pain  Cardiovascular: No chest pain, palpitations  Respiratory: No SOB, no cough  GI: No nausea, vomiting, abdominal pain  : No dysuria  Skin: no rash  Psych: no depression  Endocrine: no polyuria, polydipsia  Hem/lymph: no swelling  Osteoporosis: no fractures    ALL OTHER SYSTEMS REVIEWED AND NEGATIVE      PHYSICAL EXAM:  VITALS: T(C): 36.7 (02-01-19 @ 05:10)  T(F): 98.1 (02-01-19 @ 05:10), Max: 98.7 (01-31-19 @ 20:49)  HR: 77 (02-01-19 @ 05:10) (71 - 79)  BP: 126/80 (02-01-19 @ 05:40) (126/80 - 172/90)  RR:  (18 - 18)  SpO2:  (100% - 100%)  Wt(kg): --  GENERAL: NAD  EYES: No proptosis, no lid lag, anicteric  HEENT:  Atraumatic, Normocephalic, moist mucous membranes  GI: Soft, nontender, non distended  SKIN: Dry, intact, No rashes or lesions  NEURO: extraocular movements intact, no tremor  PSYCH: Alert and oriented x 3, normal affect, normal mood      CAPILLARY BLOOD GLUCOSE      POCT Blood Glucose.: 110 mg/dL (01 Feb 2019 07:43)  POCT Blood Glucose.: 125 mg/dL (31 Jan 2019 22:54)  POCT Blood Glucose.: 67 mg/dL (31 Jan 2019 22:26)  POCT Blood Glucose.: 87 mg/dL (31 Jan 2019 17:25)  POCT Blood Glucose.: 99 mg/dL (31 Jan 2019 12:19)      02-01    143  |  107  |  9   ----------------------------<  52<L>  3.4<L>   |  20<L>  |  0.92    EGFR if : 87  EGFR if non : 75    Ca    8.7      02-01  Mg     1.3     02-01  Phos  2.3     02-01    TPro  7.1  /  Alb  3.4  /  TBili  0.2  /  DBili  x   /  AST  16  /  ALT  5   /  AlkPhos  38<L>  02-01          Thyroid Function Tests:      Hemoglobin A1C, Whole Blood: 14.0 % <H> [4.0 - 5.6] (01-29-19 @ 06:30)

## 2019-02-01 NOTE — PROGRESS NOTE ADULT - NEUROLOGICAL DETAILS
responds to verbal commands/normal strength/alert and oriented x 3
responds to verbal commands/alert and oriented x 3/normal strength

## 2019-02-01 NOTE — PROGRESS NOTE ADULT - NEGATIVE GENERAL GENITOURINARY SYMPTOMS
no dysuria/no hematuria/no flank pain R/no flank pain L
no flank pain R/no hematuria/no flank pain L/no dysuria

## 2019-02-01 NOTE — PROGRESS NOTE ADULT - PROBLEM SELECTOR PLAN 4
-cont abx for flu/bacterial PNA  -levofloxacin 500 mg po daily x 3 days to complete abx for PNA  -potential side effects of FQs explained including GI, Cdiff, tendinitis, resistance issues, development of allergies, etc.

## 2019-02-01 NOTE — PROGRESS NOTE ADULT - PROBLEM SELECTOR PLAN 1
Continue with Lantus 25u qhs and Humalog 7u TID.  BG target 100-200 mg/dl.  Outpatient endocrine follow up with Dr. Camp at [address] [place] [address].    Will discuss with team. Change to Lantus 18u qhs and Humalog 5u TID due to poor PO intake.  BG target 100-200 mg/dl.  Outpatient endocrine follow up with Dr. Camp at [address] [place] [address], who can uptitrate insulin back to her home doses as an outpatient once pt is well.    Discussed with team.

## 2019-02-01 NOTE — PROGRESS NOTE ADULT - SUBJECTIVE AND OBJECTIVE BOX
Patient is a 48y old  Female who presents with a chief complaint of influenza A, PNA (01 Feb 2019 12:41)      SUBJECTIVE / OVERNIGHT EVENTS: Pt seen and examined at 1:25pm, no overnight events except low glucose, pt reports that she does not like the food here and her intake is low due to that otherwise feels well, no other complaints.        MEDICATIONS  (STANDING):  atorvastatin 40 milliGRAM(s) Oral at bedtime  dextrose 50% Injectable 25 Gram(s) IV Push once  escitalopram 10 milliGRAM(s) Oral <User Schedule>  insulin glargine Injectable (LANTUS) 18 Unit(s) SubCutaneous at bedtime  insulin lispro (HumaLOG) corrective regimen sliding scale   SubCutaneous three times a day before meals  insulin lispro (HumaLOG) corrective regimen sliding scale   SubCutaneous at bedtime  insulin lispro Injectable (HumaLOG) 5 Unit(s) SubCutaneous three times a day before meals  lacosamide 150 milliGRAM(s) Oral two times a day  lactobacillus acidophilus 1 Tablet(s) Oral two times a day with meals  levoFLOXacin  Tablet 500 milliGRAM(s) Oral every 24 hours  ondansetron Injectable 4 milliGRAM(s) IV Push once  oseltamivir 75 milliGRAM(s) Oral two times a day  pantoprazole    Tablet 40 milliGRAM(s) Oral before breakfast  phenytoin   Capsule 300 milliGRAM(s) Oral daily  predniSONE   Tablet 15 milliGRAM(s) Oral daily    MEDICATIONS  (PRN):  acetaminophen   Tablet .. 650 milliGRAM(s) Oral every 6 hours PRN Temp greater or equal to 38C (100.4F), Mild Pain (1 - 3), Moderate Pain (4 - 6)  dextrose 40% Gel 15 Gram(s) Oral once PRN Blood Glucose LESS THAN 70 milliGRAM(s)/deciliter      Vital Signs Last 24 Hrs  T(C): 36.7 (01 Feb 2019 12:00), Max: 37.1 (31 Jan 2019 20:49)  T(F): 98 (01 Feb 2019 12:00), Max: 98.7 (31 Jan 2019 20:49)  HR: 78 (01 Feb 2019 12:00) (77 - 79)  BP: 147/70 (01 Feb 2019 12:00) (126/80 - 172/90)  BP(mean): --  RR: 18 (01 Feb 2019 12:00) (18 - 18)  SpO2: 100% (01 Feb 2019 12:00) (100% - 100%)  CAPILLARY BLOOD GLUCOSE      POCT Blood Glucose.: 93 mg/dL (01 Feb 2019 17:16)  POCT Blood Glucose.: 139 mg/dL (01 Feb 2019 11:56)  POCT Blood Glucose.: 110 mg/dL (01 Feb 2019 07:43)  POCT Blood Glucose.: 125 mg/dL (31 Jan 2019 22:54)  POCT Blood Glucose.: 67 mg/dL (31 Jan 2019 22:26)    I&O's Summary      PHYSICAL EXAM:  GENERAL: NAD, well-developed  CHEST/LUNG: ctab  HEART: Regular rate and rhythm  ABDOMEN: Soft, Nontender, Nondistended  EXTREMITIES: No LE edema  PSYCH: Calm  NEUROLOGY: AAOx3  SKIN: No rashes or lesions      LABS:                        9.8    6.78  )-----------( 355      ( 31 Jan 2019 06:30 )             30.9     02-01    143  |  107  |  9   ----------------------------<  52<L>  3.4<L>   |  20<L>  |  0.92    Ca    8.7      01 Feb 2019 05:44  Phos  2.3     02-01  Mg     1.3     02-01    TPro  7.1  /  Alb  3.4  /  TBili  0.2  /  DBili  x   /  AST  16  /  ALT  5   /  AlkPhos  38<L>  02-01              RADIOLOGY & ADDITIONAL TESTS:    Imaging Personally Reviewed:    Consultant(s) Notes Reviewed:      Care Discussed with Consultants/Other Providers:

## 2019-02-01 NOTE — PROGRESS NOTE ADULT - PROBLEM SELECTOR PLAN 1
- p/w fever and productive cough, RVP positive for influenza A, CXR with bilateral patchy opacities R>L, CT confirms new multiple bilateral opacities c/w pneumonia  - increased dose of Tamiflu 75mg BID as renal function has improved, c/w CTX and azithromycin for CAP coverage, Improving on current abx  - droplet precautions, tylenol PRN fever, c/w IVF  ID consulted, appreciate consult, legionella neg  completed tamiflu  d/c home today, d/c planning time spent in coordination 45 mts ( discussion with Amaris, ID, HS, preparing d/c summary and plan)  advised to f/u as outpt.

## 2019-02-02 LAB
BACTERIA BLD CULT: SIGNIFICANT CHANGE UP
BACTERIA SPT RESP CULT: SIGNIFICANT CHANGE UP

## 2019-02-04 LAB
BACTERIA BLD CULT: SIGNIFICANT CHANGE UP
BACTERIA BLD CULT: SIGNIFICANT CHANGE UP

## 2019-02-12 PROBLEM — I10 ESSENTIAL (PRIMARY) HYPERTENSION: Chronic | Status: ACTIVE | Noted: 2019-01-28

## 2019-02-12 PROBLEM — E78.5 HYPERLIPIDEMIA, UNSPECIFIED: Chronic | Status: ACTIVE | Noted: 2019-01-28

## 2019-02-12 PROBLEM — N18.3 CHRONIC KIDNEY DISEASE, STAGE 3 (MODERATE): Chronic | Status: ACTIVE | Noted: 2019-01-28

## 2019-02-12 PROBLEM — G40.909 EPILEPSY, UNSPECIFIED, NOT INTRACTABLE, WITHOUT STATUS EPILEPTICUS: Chronic | Status: ACTIVE | Noted: 2019-01-28

## 2019-02-20 ENCOUNTER — APPOINTMENT (OUTPATIENT)
Dept: PULMONOLOGY | Facility: CLINIC | Age: 49
End: 2019-02-20

## 2019-02-21 ENCOUNTER — OUTPATIENT (OUTPATIENT)
Dept: OUTPATIENT SERVICES | Facility: HOSPITAL | Age: 49
LOS: 1 days | End: 2019-02-21
Payer: MEDICAID

## 2019-02-21 ENCOUNTER — APPOINTMENT (OUTPATIENT)
Dept: INTERNAL MEDICINE | Facility: CLINIC | Age: 49
End: 2019-02-21
Payer: MEDICAID

## 2019-02-21 VITALS
OXYGEN SATURATION: 97 % | DIASTOLIC BLOOD PRESSURE: 88 MMHG | BODY MASS INDEX: 21.9 KG/M2 | SYSTOLIC BLOOD PRESSURE: 112 MMHG | HEART RATE: 110 BPM | WEIGHT: 116 LBS | HEIGHT: 61 IN

## 2019-02-21 DIAGNOSIS — I10 ESSENTIAL (PRIMARY) HYPERTENSION: ICD-10-CM

## 2019-02-21 DIAGNOSIS — J10.00 INFLUENZA DUE TO OTHER IDENTIFIED INFLUENZA VIRUS WITH UNSPECIFIED TYPE OF PNEUMONIA: ICD-10-CM

## 2019-02-21 PROCEDURE — 99214 OFFICE O/P EST MOD 30 MIN: CPT | Mod: GC

## 2019-02-21 PROCEDURE — G0463: CPT

## 2019-02-22 PROBLEM — J10.00: Status: ACTIVE | Noted: 2019-02-22

## 2019-02-22 NOTE — PHYSICAL EXAM
[No Acute Distress] : no acute distress [Well Nourished] : well nourished [Well Developed] : well developed [Well-Appearing] : well-appearing [Normal Sclera/Conjunctiva] : normal sclera/conjunctiva [PERRL] : pupils equal round and reactive to light [EOMI] : extraocular movements intact [Normal Outer Ear/Nose] : the outer ears and nose were normal in appearance [Normal Oropharynx] : the oropharynx was normal [No JVD] : no jugular venous distention [Supple] : supple [No Respiratory Distress] : no respiratory distress  [Clear to Auscultation] : lungs were clear to auscultation bilaterally [No Accessory Muscle Use] : no accessory muscle use [Normal Rate] : normal rate  [Regular Rhythm] : with a regular rhythm [Normal S1, S2] : normal S1 and S2 [No Murmur] : no murmur heard [Pedal Pulses Present] : the pedal pulses are present [No Edema] : there was no peripheral edema [Soft] : abdomen soft [Non Tender] : non-tender [Non-distended] : non-distended [No Masses] : no abdominal mass palpated [No HSM] : no HSM [Normal Bowel Sounds] : normal bowel sounds [Normal Supraclavicular Nodes] : no supraclavicular lymphadenopathy [Normal Posterior Cervical Nodes] : no posterior cervical lymphadenopathy [Normal Anterior Cervical Nodes] : no anterior cervical lymphadenopathy [No CVA Tenderness] : no CVA  tenderness [No Spinal Tenderness] : no spinal tenderness [No Joint Swelling] : no joint swelling [Grossly Normal Strength/Tone] : grossly normal strength/tone [No Rash] : no rash [Normal Gait] : normal gait [Coordination Grossly Intact] : coordination grossly intact [No Focal Deficits] : no focal deficits [Normal Affect] : the affect was normal [Normal Insight/Judgement] : insight and judgment were intact

## 2019-03-04 NOTE — END OF VISIT
[] : Resident [FreeTextEntry3] : Recent flu, pneumonia, admission to hospital on this basis.  Advised to follow up chest CT to resolution of radiologic findings - will re-order this.  Has still felt limited, tired, since pneumonia - some cough with lomas inspiration -- to use Albuterol to optimize ventilation/reduce airway spasm s/p recent respiratory compromise.  To follow up at 5 weeks.

## 2019-03-04 NOTE — REVIEW OF SYSTEMS
[Fever] : no fever [Chills] : no chills [Fatigue] : no fatigue [Chest Pain] : no chest pain [Palpitations] : no palpitations [Lower Ext Edema] : no lower extremity edema [Orthopnea] : no orthopnea [Shortness Of Breath] : no shortness of breath [Wheezing] : no wheezing [Cough] : no cough [Dyspnea on Exertion] : no dyspnea on exertion [Abdominal Pain] : no abdominal pain [Nausea] : no nausea [Vomiting] : no vomiting

## 2019-03-04 NOTE — HISTORY OF PRESENT ILLNESS
[FreeTextEntry1] : hospital f/u [de-identified] : 48F with DMII (15.5% 9/18), Sarcoidosis on chronic steroids, CKDIII, seizure disorder, HTN, IDDM presents for f/u\par \par PNA - Recently admitted for sepsis with influenza PNA. Reports improvement although still with episodes of shortness of breath. Reports improvement with albuterol nebulizer of family member. Was advised to repeat Chest CT on discharge. Pending eval with pulm \par \par DM - Reports she continues with basaglar 35U qhs and humalog 20U premeals. Was titrated down during admission but reports requiring more insulin at home.

## 2019-03-04 NOTE — ASSESSMENT
[FreeTextEntry1] : 48F with DMII (15.5% 9/18), Sarcoidosis on chronic steroids, CKDIII, seizure disorder, HTN, IDDM presents for f/u\par \par PNA - overall improved\par - CT chest ordered to monitor for improvement\par - albuterol rescue inhaler ordered\par - f/u with pulm\par \par DM - Last A1c at 14 on 1/2019.\par - Cont basaglar 35U qhs and humalog 20U premeals.  \par - Advised to bring in blood glucose log\par \par f/u in 5-10 wks\par d/w Dr Echavarria

## 2019-03-05 DIAGNOSIS — J10.00 INFLUENZA DUE TO OTHER IDENTIFIED INFLUENZA VIRUS WITH UNSPECIFIED TYPE OF PNEUMONIA: ICD-10-CM

## 2019-03-05 DIAGNOSIS — E11.9 TYPE 2 DIABETES MELLITUS WITHOUT COMPLICATIONS: ICD-10-CM

## 2019-04-30 ENCOUNTER — APPOINTMENT (OUTPATIENT)
Dept: NEUROLOGY | Facility: HOSPITAL | Age: 49
End: 2019-04-30

## 2019-05-01 ENCOUNTER — APPOINTMENT (OUTPATIENT)
Dept: PULMONOLOGY | Facility: CLINIC | Age: 49
End: 2019-05-01

## 2019-07-10 ENCOUNTER — APPOINTMENT (OUTPATIENT)
Dept: PULMONOLOGY | Facility: CLINIC | Age: 49
End: 2019-07-10

## 2019-08-09 ENCOUNTER — OUTPATIENT (OUTPATIENT)
Dept: OUTPATIENT SERVICES | Facility: HOSPITAL | Age: 49
LOS: 1 days | End: 2019-08-09
Payer: MEDICAID

## 2019-08-09 ENCOUNTER — LABORATORY RESULT (OUTPATIENT)
Age: 49
End: 2019-08-09

## 2019-08-09 ENCOUNTER — APPOINTMENT (OUTPATIENT)
Dept: INTERNAL MEDICINE | Facility: CLINIC | Age: 49
End: 2019-08-09
Payer: MEDICAID

## 2019-08-09 VITALS
SYSTOLIC BLOOD PRESSURE: 154 MMHG | BODY MASS INDEX: 21.16 KG/M2 | DIASTOLIC BLOOD PRESSURE: 92 MMHG | OXYGEN SATURATION: 99 % | HEART RATE: 85 BPM | WEIGHT: 112 LBS

## 2019-08-09 DIAGNOSIS — I10 ESSENTIAL (PRIMARY) HYPERTENSION: ICD-10-CM

## 2019-08-09 LAB
ALBUMIN SERPL ELPH-MCNC: 4.9 G/DL — SIGNIFICANT CHANGE UP (ref 3.3–5)
ALP SERPL-CCNC: 46 U/L — SIGNIFICANT CHANGE UP (ref 40–120)
ALT FLD-CCNC: 5 U/L — LOW (ref 10–45)
ANION GAP SERPL CALC-SCNC: 13 MMOL/L — SIGNIFICANT CHANGE UP (ref 5–17)
AST SERPL-CCNC: 9 U/L — LOW (ref 10–40)
BASOPHILS # BLD AUTO: 0.04 K/UL — SIGNIFICANT CHANGE UP (ref 0–0.2)
BASOPHILS NFR BLD AUTO: 0.5 % — SIGNIFICANT CHANGE UP (ref 0–2)
BILIRUB SERPL-MCNC: 0.4 MG/DL — SIGNIFICANT CHANGE UP (ref 0.2–1.2)
BUN SERPL-MCNC: 25 MG/DL — HIGH (ref 7–23)
CALCIUM SERPL-MCNC: 10.1 MG/DL — SIGNIFICANT CHANGE UP (ref 8.4–10.5)
CHLORIDE SERPL-SCNC: 102 MMOL/L — SIGNIFICANT CHANGE UP (ref 96–108)
CHOLEST SERPL-MCNC: 263 MG/DL — HIGH (ref 10–199)
CO2 SERPL-SCNC: 23 MMOL/L — SIGNIFICANT CHANGE UP (ref 22–31)
CREAT SERPL-MCNC: 1.35 MG/DL — HIGH (ref 0.5–1.3)
EOSINOPHIL # BLD AUTO: 0.35 K/UL — SIGNIFICANT CHANGE UP (ref 0–0.5)
EOSINOPHIL NFR BLD AUTO: 4.8 % — SIGNIFICANT CHANGE UP (ref 0–6)
GLUCOSE SERPL-MCNC: 277 MG/DL — HIGH (ref 70–99)
HCT VFR BLD CALC: 39.1 % — SIGNIFICANT CHANGE UP (ref 34.5–45)
HDLC SERPL-MCNC: 41 MG/DL — LOW
HGB BLD-MCNC: 13.2 G/DL — SIGNIFICANT CHANGE UP (ref 11.5–15.5)
IMM GRANULOCYTES NFR BLD AUTO: 0.4 % — SIGNIFICANT CHANGE UP (ref 0–1.5)
LIPID PNL WITH DIRECT LDL SERPL: 155 MG/DL — HIGH
LYMPHOCYTES # BLD AUTO: 1.93 K/UL — SIGNIFICANT CHANGE UP (ref 1–3.3)
LYMPHOCYTES # BLD AUTO: 26.3 % — SIGNIFICANT CHANGE UP (ref 13–44)
MCHC RBC-ENTMCNC: 28.3 PG — SIGNIFICANT CHANGE UP (ref 27–34)
MCHC RBC-ENTMCNC: 33.8 GM/DL — SIGNIFICANT CHANGE UP (ref 32–36)
MCV RBC AUTO: 83.9 FL — SIGNIFICANT CHANGE UP (ref 80–100)
MONOCYTES # BLD AUTO: 0.43 K/UL — SIGNIFICANT CHANGE UP (ref 0–0.9)
MONOCYTES NFR BLD AUTO: 5.9 % — SIGNIFICANT CHANGE UP (ref 2–14)
NEUTROPHILS # BLD AUTO: 4.57 K/UL — SIGNIFICANT CHANGE UP (ref 1.8–7.4)
NEUTROPHILS NFR BLD AUTO: 62.1 % — SIGNIFICANT CHANGE UP (ref 43–77)
PLATELET # BLD AUTO: 245 K/UL — SIGNIFICANT CHANGE UP (ref 150–400)
POTASSIUM SERPL-MCNC: 5.1 MMOL/L — SIGNIFICANT CHANGE UP (ref 3.5–5.3)
POTASSIUM SERPL-SCNC: 5.1 MMOL/L — SIGNIFICANT CHANGE UP (ref 3.5–5.3)
PROT SERPL-MCNC: 7.9 G/DL — SIGNIFICANT CHANGE UP (ref 6–8.3)
RBC # BLD: 4.66 M/UL — SIGNIFICANT CHANGE UP (ref 3.8–5.2)
RBC # FLD: 13.3 % — SIGNIFICANT CHANGE UP (ref 10.3–14.5)
SODIUM SERPL-SCNC: 138 MMOL/L — SIGNIFICANT CHANGE UP (ref 135–145)
TOTAL CHOLESTEROL/HDL RATIO MEASUREMENT: 6.4 RATIO — SIGNIFICANT CHANGE UP (ref 3.3–7.1)
TRIGL SERPL-MCNC: 336 MG/DL — HIGH (ref 10–149)
WBC # BLD: 7.35 K/UL — SIGNIFICANT CHANGE UP (ref 3.8–10.5)
WBC # FLD AUTO: 7.35 K/UL — SIGNIFICANT CHANGE UP (ref 3.8–10.5)

## 2019-08-09 PROCEDURE — 80053 COMPREHEN METABOLIC PANEL: CPT

## 2019-08-09 PROCEDURE — 80061 LIPID PANEL: CPT

## 2019-08-09 PROCEDURE — G0463: CPT | Mod: 25

## 2019-08-09 PROCEDURE — 83036 HEMOGLOBIN GLYCOSYLATED A1C: CPT

## 2019-08-09 PROCEDURE — 99213 OFFICE O/P EST LOW 20 MIN: CPT | Mod: GE

## 2019-08-09 PROCEDURE — 82043 UR ALBUMIN QUANTITATIVE: CPT

## 2019-08-09 RX ORDER — MULTIVITAMIN
CAPSULE ORAL
Refills: 0 | Status: DISCONTINUED | COMMUNITY
Start: 2018-01-12 | End: 2019-08-09

## 2019-08-09 RX ORDER — NYSTATIN 100000 [USP'U]/G
100000 CREAM TOPICAL 3 TIMES DAILY
Qty: 1 | Refills: 1 | Status: DISCONTINUED | COMMUNITY
Start: 2019-01-10 | End: 2019-08-09

## 2019-08-09 RX ORDER — ALPRAZOLAM 0.25 MG/1
0.25 TABLET ORAL
Qty: 90 | Refills: 0 | Status: DISCONTINUED | COMMUNITY
Start: 2018-06-27 | End: 2019-08-09

## 2019-08-09 RX ORDER — ESCITALOPRAM OXALATE 10 MG/1
10 TABLET ORAL DAILY
Qty: 30 | Refills: 2 | Status: DISCONTINUED | COMMUNITY
Start: 2018-06-28 | End: 2019-08-09

## 2019-08-09 NOTE — PHYSICAL EXAM
[No Acute Distress] : no acute distress [Well Nourished] : well nourished [Well Developed] : well developed [PERRL] : pupils equal round and reactive to light [EOMI] : extraocular movements intact [Normal Outer Ear/Nose] : the outer ears and nose were normal in appearance [Normal Oropharynx] : the oropharynx was normal [Supple] : supple [No Lymphadenopathy] : no lymphadenopathy [No Accessory Muscle Use] : no accessory muscle use [Clear to Auscultation] : lungs were clear to auscultation bilaterally [Regular Rhythm] : with a regular rhythm [Normal S1, S2] : normal S1 and S2 [Soft] : abdomen soft [No HSM] : no HSM [No CVA Tenderness] : no CVA  tenderness [No Spinal Tenderness] : no spinal tenderness [Grossly Normal Strength/Tone] : grossly normal strength/tone [No Rash] : no rash [Coordination Grossly Intact] : coordination grossly intact [Normal Affect] : the affect was normal [No Focal Deficits] : no focal deficits [Normal Insight/Judgement] : insight and judgment were intact

## 2019-08-10 LAB
CREAT ?TM UR-MCNC: 116 MG/DL — SIGNIFICANT CHANGE UP
ESTIMATED AVERAGE GLUCOSE: 275 MG/DL — HIGH (ref 68–114)
HBA1C BLD-MCNC: 11.2 % — HIGH (ref 4–5.6)
MICROALBUMIN UR-MCNC: 105.9 MG/DL — SIGNIFICANT CHANGE UP
MICROALBUMIN/CREAT UR-RTO: 911 MG/G — HIGH (ref 0–30)

## 2019-08-12 LAB — HBA1C MFR BLD HPLC: 12

## 2019-08-12 NOTE — ASSESSMENT
[FreeTextEntry1] : 48F with DMII (15.5% 9/18), Sarcoidosis on chronic steroids, CKDIII, seizure disorder, HTN, IDDM presents for f/u\par \par HCM\par Immunizations UTD \par GYN ref for pap smear\par Podiatry and Opthalmology ref given\par \par Case discussed w Dr. Burton \par \par RTC in 5 weeks

## 2019-08-12 NOTE — HISTORY OF PRESENT ILLNESS
[FreeTextEntry1] : Follow Up [de-identified] : 48F with DMII (15.5% 9/18), Sarcoidosis on chronic steroids, CKDIII, seizure disorder, HTN, IDDM presents for f/u.\par \par Overall pt is doing well, denies recent hospitalizations or ED visits. Notes she has run out of a number of medications including her Prednisone that she hasn't had for approx 3 weeks. She notes shes had a new productive cough for 2 weeks, however denies recent sick contacts, fevers or chills. She notes shes had difficulty sleeping for last month namely because her sleep schedule is out of wack.\par With regards to DM pt takes insulin however at lower doses than prescribed because of feelings of hypoglycemia including tremors, HA, dizziness and lightheadedness. Generally FS run from 100 fasting to 260-280 postprandially. Currently pt is taking 15-20 units premeal and 25 units at bedtime. \par Has complaints of HA and neck pain- namely when BP is elevated w SBP approx 180's at home.

## 2019-08-12 NOTE — REVIEW OF SYSTEMS
[Headache] : headache [Dizziness] : dizziness [Fever] : no fever [Chills] : no chills [Fatigue] : no fatigue [Vision Problems] : no vision problems [Hearing Loss] : no hearing loss [Chest Pain] : no chest pain [Palpitations] : no palpitations [Lower Ext Edema] : no lower extremity edema [Shortness Of Breath] : no shortness of breath [Dyspnea on Exertion] : no dyspnea on exertion [Abdominal Pain] : no abdominal pain [Nausea] : no nausea [Vomiting] : no vomiting [Joint Pain] : no joint pain [Muscle Pain] : no muscle pain [Skin Rash] : no skin rash [Unsteady Walking] : no ataxia [Anxiety] : no anxiety [Depression] : no depression

## 2019-08-13 DIAGNOSIS — D86.9 SARCOIDOSIS, UNSPECIFIED: ICD-10-CM

## 2019-08-13 DIAGNOSIS — G40.909 EPILEPSY, UNSPECIFIED, NOT INTRACTABLE, WITHOUT STATUS EPILEPTICUS: ICD-10-CM

## 2019-08-13 DIAGNOSIS — N18.9 CHRONIC KIDNEY DISEASE, UNSPECIFIED: ICD-10-CM

## 2019-08-13 DIAGNOSIS — E11.9 TYPE 2 DIABETES MELLITUS WITHOUT COMPLICATIONS: ICD-10-CM

## 2019-09-13 ENCOUNTER — APPOINTMENT (OUTPATIENT)
Dept: INTERNAL MEDICINE | Facility: CLINIC | Age: 49
End: 2019-09-13
Payer: MEDICAID

## 2019-09-13 ENCOUNTER — OUTPATIENT (OUTPATIENT)
Dept: OUTPATIENT SERVICES | Facility: HOSPITAL | Age: 49
LOS: 1 days | End: 2019-09-13
Payer: MEDICAID

## 2019-09-13 VITALS
OXYGEN SATURATION: 99 % | DIASTOLIC BLOOD PRESSURE: 96 MMHG | BODY MASS INDEX: 21.16 KG/M2 | WEIGHT: 112 LBS | SYSTOLIC BLOOD PRESSURE: 142 MMHG | HEART RATE: 87 BPM

## 2019-09-13 LAB
BILIRUB UR QL STRIP: NORMAL
CLARITY UR: NORMAL
COLLECTION METHOD: NORMAL
GLUCOSE BLDC GLUCOMTR-MCNC: 307
GLUCOSE BLDC GLUCOMTR-MCNC: 460
GLUCOSE BLDC GLUCOMTR-MCNC: 588
GLUCOSE UR-MCNC: NORMAL
HBA1C MFR BLD HPLC: 11.1
HCG UR QL: 0.2 EU/DL
HGB UR QL STRIP.AUTO: NORMAL
KETONES UR-MCNC: NORMAL
LEUKOCYTE ESTERASE UR QL STRIP: NORMAL
NITRITE UR QL STRIP: NORMAL
PH UR STRIP: 6.5
PROT UR STRIP-MCNC: NORMAL
SP GR UR STRIP: 1.01

## 2019-09-13 PROCEDURE — 99215 OFFICE O/P EST HI 40 MIN: CPT | Mod: GC

## 2019-09-13 PROCEDURE — G0463: CPT | Mod: 25

## 2019-09-13 PROCEDURE — 83036 HEMOGLOBIN GLYCOSYLATED A1C: CPT

## 2019-09-13 PROCEDURE — 81002 URINALYSIS NONAUTO W/O SCOPE: CPT

## 2019-09-13 PROCEDURE — 81003 URINALYSIS AUTO W/O SCOPE: CPT

## 2019-09-13 RX ORDER — INSULIN GLARGINE 100 [IU]/ML
100 INJECTION, SOLUTION SUBCUTANEOUS
Qty: 3 | Refills: 5 | Status: DISCONTINUED | COMMUNITY
Start: 2018-03-05 | End: 2019-09-13

## 2019-09-13 RX ORDER — SODIUM CHLORIDE 9 G/ML
0.9 INJECTION, SOLUTION INTRAVENOUS
Qty: 0 | Refills: 0 | Status: COMPLETED | OUTPATIENT
Start: 2019-09-13

## 2019-09-13 RX ORDER — INSULIN LISPRO 100 [IU]/ML
100 INJECTION, SOLUTION INTRAVENOUS; SUBCUTANEOUS
Qty: 0 | Refills: 0 | Status: COMPLETED | OUTPATIENT
Start: 2019-09-13

## 2019-09-13 RX ADMIN — INSULIN HUMAN 0 UNIT/ML: 100 INJECTION, SOLUTION PARENTERAL at 00:00

## 2019-09-13 RX ADMIN — SODIUM CHLORIDE 0 %: 9 INJECTION, SOLUTION INTRAVENOUS at 00:00

## 2019-09-13 RX ADMIN — SODIUM CHLORIDE %: 9 INJECTION, SOLUTION INTRAVENOUS at 00:00

## 2019-09-13 RX ADMIN — INSULIN LISPRO 15 UNIT/ML: 100 INJECTION, SOLUTION INTRAVENOUS; SUBCUTANEOUS at 00:00

## 2019-09-13 NOTE — PHYSICAL EXAM
[No Acute Distress] : no acute distress [Well Nourished] : well nourished [EOMI] : extraocular movements intact [Normal Outer Ear/Nose] : the outer ears and nose were normal in appearance [No Lymphadenopathy] : no lymphadenopathy [No Accessory Muscle Use] : no accessory muscle use [No Respiratory Distress] : no respiratory distress  [Regular Rhythm] : with a regular rhythm [Normal Rate] : normal rate  [Soft] : abdomen soft [Non Tender] : non-tender [No HSM] : no HSM [No Spinal Tenderness] : no spinal tenderness [Grossly Normal Strength/Tone] : grossly normal strength/tone [Coordination Grossly Intact] : coordination grossly intact [No Rash] : no rash [Memory Grossly Normal] : memory grossly normal [No Focal Deficits] : no focal deficits [Normal Mood] : the mood was normal [Normal Affect] : the affect was normal [Normal Insight/Judgement] : insight and judgment were intact [Normal S1, S2] : normal S1 and S2

## 2019-09-14 NOTE — REVIEW OF SYSTEMS
[Fatigue] : fatigue [Back Pain] : back pain [Dizziness] : dizziness [Headache] : headache [Fever] : no fever [Chills] : no chills [Vision Problems] : no vision problems [Earache] : no earache [Hearing Loss] : no hearing loss [Chest Pain] : no chest pain [Palpitations] : no palpitations [Shortness Of Breath] : no shortness of breath [Dyspnea on Exertion] : no dyspnea on exertion [Abdominal Pain] : no abdominal pain [Nausea] : no nausea [Dysuria] : no dysuria [Joint Pain] : no joint pain [Muscle Pain] : no muscle pain [Skin Rash] : no skin rash [Anxiety] : no anxiety

## 2019-09-14 NOTE — HISTORY OF PRESENT ILLNESS
[FreeTextEntry1] : Follow Up [de-identified] : 49F with DMII (11.2% 8/19), Sarcoidosis on chronic steroids, CKDIII, seizure disorder, HTN, IDDM presents for f/u.\par \par Pt states she has been doing well since last meeting. Pt states previous cough has resolved since restarting chronic prednisone. Pt states she hasn't has insulin nor supplies for 2 weeks and thus has been non-adherent. Pt states when she has been taking insulin she has experienced relative hypoglycemia w 's, but also expressed periods w FS 50-70 after taking injecting premeal hypotension- notes she's has inconsistent food intake.\par \par Today POCT , A1C 11.1.% POCT Urine negative for ketones. Pt asymptomatic, denies HA, dizziness, Diaphoresis, palpitations.

## 2019-09-14 NOTE — ASSESSMENT
[FreeTextEntry1] : 49F with DMII (11.2% 8/19), Sarcoidosis on chronic steroids, CKDIII, seizure disorder, HTN, IDDM presents for f/u.\par Pt found to have FS >500, UA w/o ketones consistent w hyperglycemic hyperosmolar syndrome.\par \par Will address HCM during next visit\par \par RTC in 2 weeks for follow up\par \par Case discussed w Dr. Burton

## 2019-09-18 ENCOUNTER — APPOINTMENT (OUTPATIENT)
Dept: PULMONOLOGY | Facility: CLINIC | Age: 49
End: 2019-09-18
Payer: MEDICAID

## 2019-09-18 VITALS
HEART RATE: 86 BPM | SYSTOLIC BLOOD PRESSURE: 133 MMHG | TEMPERATURE: 97.9 F | OXYGEN SATURATION: 99 % | DIASTOLIC BLOOD PRESSURE: 79 MMHG | RESPIRATION RATE: 16 BRPM | WEIGHT: 123.13 LBS | BODY MASS INDEX: 23.26 KG/M2

## 2019-09-18 PROCEDURE — 99213 OFFICE O/P EST LOW 20 MIN: CPT | Mod: GC

## 2019-09-18 NOTE — HISTORY OF PRESENT ILLNESS
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 \par \par # Sarcoidosis\par Patient has been on chronic steroids for last several years. Dx with sarcoid in 2010 but had extrapulmonary involvement in 2018. Patient has poor up in our office. Since last visit had had a CT scan showing scattered nodular opacity in Jan when admitted for FlluA/pneumonia with stable lower cystic disease and lymph adenopathy. She has been feeling better on this current dose of steroids . Ran out of meds several weeks ago and symptoms of cough, sputum production and ENGLISH worsened. Went to see her PMD who reordered steroids with improvement in her symptoms.

## 2019-09-18 NOTE — PHYSICAL EXAM
[General Appearance - Well Nourished] : well nourished [General Appearance - Well Developed] : well developed [General Appearance - In No Acute Distress] : no acute distress [Normal Oropharynx] : normal oropharynx [Neck Appearance] : the appearance of the neck was normal [Heart Rate And Rhythm] : heart rate and rhythm were normal [Heart Sounds] : normal S1 and S2 [Murmurs] : no murmurs present [Edema] : no peripheral edema present [Respiration, Rhythm And Depth] : normal respiratory rhythm and effort [Exaggerated Use Of Accessory Muscles For Inspiration] : no accessory muscle use [Auscultation Breath Sounds / Voice Sounds] : lungs were clear to auscultation bilaterally [Abdomen Soft] : soft [Bowel Sounds] : normal bowel sounds [Abdomen Tenderness] : non-tender [Nail Clubbing] : no clubbing of the fingernails [Abnormal Walk] : normal gait [] : no rash [Cyanosis, Localized] : no localized cyanosis [No Focal Deficits] : no focal deficits [Cranial Nerves] : cranial nerves 2-12 were intact [Affect] : the affect was normal [Oriented To Time, Place, And Person] : oriented to person, place, and time [Impaired Insight] : insight and judgment were intact [Mood] : the mood was normal

## 2019-09-18 NOTE — ASSESSMENT
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 \par \par # Sarcoidosis\par - currently on prednisone 15mg with control of symptoms. \par - Will need 6 min walk test and PFT prior to next visit. \par - Will attempt to titrate down to the lowest possible dose\par - D/W patient about dangers of abruptly stopping chronic steroids\par - F/U in clinic 2-4 weeks \par Walked patient 180' without O2 desaturation\par # HCM\par - D/W patient about the risk and benefits of flu vaccinations. \par - Would like to d/w her PMD Dr. Marquez prior to administation.

## 2019-09-18 NOTE — REVIEW OF SYSTEMS
[Fatigue] : fatigue [Cough] : cough [Dyspnea] : dyspnea [Wheezing] : wheezing [Hypertension] : ~T hypertension [Diabetes] : diabetes mellitus [Sputum] : sputum  [Negative] : Dermatologic [Fever] : no fever [Chills] : no chills [Poor Appetite] : normal appetite  [Pleuritic Pain] : no pleuritic pain [Palpitations] : no palpitations [Edema] : ~T edema was not present [Nausea] : no nausea [Vomiting] : no vomiting [de-identified] : lesion right arm

## 2019-09-19 DIAGNOSIS — I10 ESSENTIAL (PRIMARY) HYPERTENSION: ICD-10-CM

## 2019-10-01 ENCOUNTER — APPOINTMENT (OUTPATIENT)
Dept: INTERNAL MEDICINE | Facility: CLINIC | Age: 49
End: 2019-10-01

## 2019-10-03 DIAGNOSIS — G40.909 EPILEPSY, UNSPECIFIED, NOT INTRACTABLE, WITHOUT STATUS EPILEPTICUS: ICD-10-CM

## 2019-10-03 DIAGNOSIS — D86.9 SARCOIDOSIS, UNSPECIFIED: ICD-10-CM

## 2019-10-03 DIAGNOSIS — E11.9 TYPE 2 DIABETES MELLITUS WITHOUT COMPLICATIONS: ICD-10-CM

## 2019-10-10 ENCOUNTER — APPOINTMENT (OUTPATIENT)
Dept: OBGYN | Facility: CLINIC | Age: 49
End: 2019-10-10

## 2019-10-16 ENCOUNTER — APPOINTMENT (OUTPATIENT)
Dept: NEPHROLOGY | Facility: CLINIC | Age: 49
End: 2019-10-16

## 2019-10-17 ENCOUNTER — APPOINTMENT (OUTPATIENT)
Dept: CHRONIC DISEASE MANAGEMENT | Facility: CLINIC | Age: 49
End: 2019-10-17

## 2019-11-05 ENCOUNTER — APPOINTMENT (OUTPATIENT)
Dept: NEUROLOGY | Facility: HOSPITAL | Age: 49
End: 2019-11-05

## 2019-11-11 ENCOUNTER — APPOINTMENT (OUTPATIENT)
Dept: PODIATRY | Facility: HOSPITAL | Age: 49
End: 2019-11-11

## 2019-11-14 NOTE — CHART NOTE - NSCHARTNOTEFT_GEN_A_CORE
Spoke to ID and will d/c on Levaquin for 3 more days and patient completed Tamiflu today so no need for further doses. Spoke to Endo and given patient not eating much will decrease Lantus to 18U QHS and Humalog 5U AC but will need prompt follow-up with her endocrinologist in next few days because she likely will be back to eating her normal diet at home.   -James GAFFNEY NP Service PGY4 [facility] Pager#[phone] 21:56

## 2019-11-27 ENCOUNTER — APPOINTMENT (OUTPATIENT)
Dept: PULMONOLOGY | Facility: CLINIC | Age: 49
End: 2019-11-27

## 2019-12-02 NOTE — PROVIDER CONTACT NOTE (OTHER) - ACTION/TREATMENT ORDERED:
Follow Up Call  Onset: two days  Location / description: Patient calling with non bloody diarrhea, watery with mucous, about five stools/day. She has had this for two weeks. She has tried Imodium, but it hasn't helped.  She was seen in the office on 11/25/19 and had lab/ stool tests done, which were all negative. She is concerned because she is still having diarrhea. She has been on \"mostly\" BRAT diet for a week. She has been taking Imodium four times/day but is still having diarrhea. (No Imodium taken 48 hours before labs were done.) No abdominal or low back pain. No fever. No new medications. She did stop taking magnesium. No other signs of illness. She is drinking a lot of fluids. She is asking for a stronger medication that Imodium. She would use the [facility] in [place] if medication is called in for her. She is already taking Imodium, so she doesn't need a prescription for that. Please call her back.      CXR and EKG ordered.  Continue 2LNC.

## 2020-03-03 NOTE — ED PROVIDER NOTE - RESPIRATORY, MLM
Discharge Instructions/Wound [facility]  [address]  [place], [address] [facility]  Telephone: [phone]       Wound Care Orders:    Left mid foot   Clean wound with NS, pat dry. Pack with endoform, 2.5 cm @ 10:00. . Patient to change outer dressing only. Leotis Curl, RG and tape  Left heel    Clean wound with NS, pat dry. Apply endoform, HFBR, gauze RG. Right foot plantar lateral    Clean wound with NS, pat dry. Apply endoform, HFBR, gauze RG. Return to wound center in 1 week to see Dr Elaine No. .      Treatment Orders:   Elevate leg(s) above the level of the heart when sitting, if swelling present. Avoid prolonged standing in one place. Wear off-loading shoe/boot on the affected foot when walking. Do not get dressing/cast wet. Do not use objects to scratch inside cast/wrap. Follow diet as prescribed:  [] Diet as tolerated: [x] Diabetic Diet: Low carb no sugar [] No Added Salt:  [] Increase Protein: [] Other:                Follow-up:  [x] Return Appointment: With Dr Elaine No in 1 week. [] Ordered tests:        Electronically signed Leticia Pabon RN on 3/3/2020 at [facility] [address] Information: Should you experience any significant changes in your wound(s) or have questions about your wound care, please contact the [address] [place] [address] at [address] [place] [address] 8:00 am - 4:30. If you need help with your wound outside these hours and cannot wait until we are again available, contact your PCP or go to the hospital emergency room. PLEASE NOTE: IF YOU ARE UNABLE TO OBTAIN WOUND SUPPLIES, CONTINUE TO USE THE SUPPLIES YOU HAVE AVAILABLE UNTIL YOU ARE ABLE TO REACH US. IT IS MOST IMPORTANT TO KEEP THE WOUND COVERED AT ALL TIMES.      Physician Signature:_______________________    Date: ___________ Time:  ____________
Breath sounds clear and equal bilaterally.

## 2020-03-11 ENCOUNTER — APPOINTMENT (OUTPATIENT)
Dept: PULMONOLOGY | Facility: CLINIC | Age: 50
End: 2020-03-11
Payer: MEDICAID

## 2020-03-11 VITALS
RESPIRATION RATE: 17 BRPM | WEIGHT: 119 LBS | HEART RATE: 81 BPM | OXYGEN SATURATION: 100 % | HEIGHT: 61 IN | DIASTOLIC BLOOD PRESSURE: 92 MMHG | SYSTOLIC BLOOD PRESSURE: 144 MMHG | TEMPERATURE: 97.9 F | BODY MASS INDEX: 22.47 KG/M2

## 2020-03-11 VITALS — HEIGHT: 61 IN | BODY MASS INDEX: 22.47 KG/M2 | WEIGHT: 119 LBS

## 2020-03-11 PROCEDURE — 94618 PULMONARY STRESS TESTING: CPT | Mod: GC

## 2020-03-11 PROCEDURE — 99213 OFFICE O/P EST LOW 20 MIN: CPT | Mod: 25,GC

## 2020-03-11 PROCEDURE — 94726 PLETHYSMOGRAPHY LUNG VOLUMES: CPT | Mod: GC

## 2020-03-11 PROCEDURE — ZZZZZ: CPT

## 2020-03-11 PROCEDURE — 94010 BREATHING CAPACITY TEST: CPT | Mod: GC

## 2020-03-11 PROCEDURE — 94729 DIFFUSING CAPACITY: CPT | Mod: GC

## 2020-03-11 NOTE — REVIEW OF SYSTEMS
[Fatigue] : fatigue [Eye Irritation] : eye irritation [Nasal Congestion] : nasal congestion [Cough] : cough [Dyspnea] : dyspnea [SOB on Exertion] : sob on exertion [PND] : PND [Watery Eyes] : watery eyes [Seasonal Allergies] : seasonal allergies [Chronic Pain] : chronic pain [Headache] : headache [Negative] : Endocrine [Fever] : no fever [Recent Wt Gain (___ Lbs)] : ~T no recent weight gain [Chills] : no chills [Recent Wt Loss (___ Lbs)] : ~T no recent weight loss [Epistaxis] : no epistaxis [Sore Throat] : no sore throat [Postnasal Drip] : no postnasal drip [Hemoptysis] : no hemoptysis [Chest Tightness] : no chest tightness [Sputum] : no sputum [Pleuritic Pain] : no pleuritic pain [Wheezing] : no wheezing [Chest Discomfort] : no chest discomfort [Edema] : no edema [Orthopnea] : no orthopnea [Itchy Eyes] : no itchy eyes [Nasal Discharge] : no nasal discharge [GERD] : no gerd [Abdominal Pain] : no abdominal pain [Nausea] : no nausea [Vomiting] : no vomiting [Arthralgias] : no arthralgias [Myalgias] : no myalgias [Back Pain] : no back pain [TextBox_94] : chronic LLE pain

## 2020-03-11 NOTE — PHYSICAL EXAM
[No Acute Distress] : no acute distress [Well Nourished] : well nourished [Well Developed] : well developed [Normal Oropharynx] : normal oropharynx [Erythema] : erythema [Turbinate hypertrophy] : turbinate hypertrophy [Normal Appearance] : normal appearance [Supple] : supple [No Neck Mass] : no neck mass [No JVD] : no jvd [Normal Rate/Rhythm] : normal rate/rhythm [Normal Pulses] : normal pulses [Normal S1, S2] : normal s1, s2 [No Murmurs] : no murmurs [No Resp Distress] : no resp distress [No Acc Muscle Use] : no acc muscle use [Normal Rhythm and Effort] : normal rhythm and effort [No Abnormalities] : no abnormalities [Benign] : benign [Gait - Sufficient For Exercise Testing] : gait sufficient for exercise testing [No Clubbing] : no clubbing [No Cyanosis] : no cyanosis [No Edema] : no edema [FROM] : FROM [Normal Color/ Pigmentation] : normal color/ pigmentation [No Focal Deficits] : no focal deficits [Oriented x3] : oriented x3 [Normal Affect] : normal affect [TextBox_68] : fine crackles present b/l

## 2020-03-11 NOTE — REASON FOR VISIT
[Follow-Up] : a follow-up visit [Pulmonary Fibrosis] : pulmonary fibrosis [Sarcoidosis] : sarcoidosis

## 2020-03-11 NOTE — ASSESSMENT
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 PFTs from today reveal mild restrictive defect with severe reduction in DLCO, essentially unchanged from 2018. 6MWT distance was suboptimal at 362m.\par \par Sarcoidosis\par - continue prednisone 15 mg daily, reiterated importance of medication adherence and proper follow up\par - albuterol prn\par - PFTs today do not reveal significant progression of pulmonary disease\par - 6MWT distance suboptimal, however patient did not desaturate after walking 362 meters, though was symptomatic\par - repeat CT chest\par - will attempt to start slow steroid taper if patient is adherent to follow up\par \par RTC in 1 month

## 2020-03-11 NOTE — HISTORY OF PRESENT ILLNESS
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 Reports symptoms were generally well controlled up until that time. Since stopping steroids, patient has been having worsening SOB and dyspnea and PND. Reportedly tried calling the office for a refill but was unable to get through. Denies any f/c, cough, URI symptoms, sick contacts, recent travel, chest pain, sputum production, hemoptysis.

## 2020-03-19 ENCOUNTER — NON-APPOINTMENT (OUTPATIENT)
Age: 50
End: 2020-03-19

## 2020-04-06 ENCOUNTER — APPOINTMENT (OUTPATIENT)
Dept: PODIATRY | Facility: HOSPITAL | Age: 50
End: 2020-04-06

## 2020-04-09 ENCOUNTER — APPOINTMENT (OUTPATIENT)
Dept: INTERNAL MEDICINE | Facility: CLINIC | Age: 50
End: 2020-04-09

## 2020-04-09 ENCOUNTER — OUTPATIENT (OUTPATIENT)
Dept: OUTPATIENT SERVICES | Facility: HOSPITAL | Age: 50
LOS: 1 days | End: 2020-04-09
Payer: MEDICAID

## 2020-04-09 DIAGNOSIS — B37.9 CANDIDIASIS, UNSPECIFIED: ICD-10-CM

## 2020-04-09 PROCEDURE — G0463: CPT

## 2020-04-17 ENCOUNTER — INPATIENT (INPATIENT)
Facility: HOSPITAL | Age: 50
LOS: 9 days | Discharge: HOME | DRG: 871 | End: 2020-04-27
Attending: HOSPITALIST | Admitting: STUDENT IN AN ORGANIZED HEALTH CARE EDUCATION/TRAINING PROGRAM
Payer: MEDICAID

## 2020-04-17 VITALS
DIASTOLIC BLOOD PRESSURE: 82 MMHG | HEART RATE: 108 BPM | TEMPERATURE: 98 F | RESPIRATION RATE: 28 BRPM | OXYGEN SATURATION: 85 % | SYSTOLIC BLOOD PRESSURE: 137 MMHG

## 2020-04-17 LAB
ALBUMIN SERPL ELPH-MCNC: 3.9 G/DL — SIGNIFICANT CHANGE UP (ref 3.3–5)
ALP SERPL-CCNC: 53 U/L — SIGNIFICANT CHANGE UP (ref 40–120)
ALT FLD-CCNC: 9 U/L — LOW (ref 10–45)
ANION GAP SERPL CALC-SCNC: 17 MMOL/L — SIGNIFICANT CHANGE UP (ref 5–17)
APPEARANCE UR: CLEAR — SIGNIFICANT CHANGE UP
APTT BLD: 28.8 SEC — SIGNIFICANT CHANGE UP (ref 27.5–36.3)
AST SERPL-CCNC: 16 U/L — SIGNIFICANT CHANGE UP (ref 10–40)
BASOPHILS # BLD AUTO: 0.01 K/UL — SIGNIFICANT CHANGE UP (ref 0–0.2)
BASOPHILS NFR BLD AUTO: 0.2 % — SIGNIFICANT CHANGE UP (ref 0–2)
BILIRUB SERPL-MCNC: 0.4 MG/DL — SIGNIFICANT CHANGE UP (ref 0.2–1.2)
BILIRUB UR-MCNC: NEGATIVE — SIGNIFICANT CHANGE UP
BUN SERPL-MCNC: 56 MG/DL — HIGH (ref 7–23)
CALCIUM SERPL-MCNC: 10.1 MG/DL — SIGNIFICANT CHANGE UP (ref 8.4–10.5)
CHLORIDE SERPL-SCNC: 87 MMOL/L — LOW (ref 96–108)
CK SERPL-CCNC: 12 U/L — LOW (ref 25–170)
CO2 SERPL-SCNC: 21 MMOL/L — LOW (ref 22–31)
COLOR SPEC: COLORLESS — SIGNIFICANT CHANGE UP
CREAT SERPL-MCNC: 1.82 MG/DL — HIGH (ref 0.5–1.3)
D DIMER BLD IA.RAPID-MCNC: 314 NG/ML DDU — HIGH
DIFF PNL FLD: ABNORMAL
EOSINOPHIL # BLD AUTO: 0 K/UL — SIGNIFICANT CHANGE UP (ref 0–0.5)
EOSINOPHIL NFR BLD AUTO: 0 % — SIGNIFICANT CHANGE UP (ref 0–6)
GAS PNL BLDV: SIGNIFICANT CHANGE UP
GLUCOSE SERPL-MCNC: 1039 MG/DL — CRITICAL HIGH (ref 70–99)
GLUCOSE UR QL: ABNORMAL
HCT VFR BLD CALC: 43.8 % — SIGNIFICANT CHANGE UP (ref 34.5–45)
HGB BLD-MCNC: 13.5 G/DL — SIGNIFICANT CHANGE UP (ref 11.5–15.5)
IMM GRANULOCYTES NFR BLD AUTO: 0.6 % — SIGNIFICANT CHANGE UP (ref 0–1.5)
INR BLD: 1.05 RATIO — SIGNIFICANT CHANGE UP (ref 0.88–1.16)
KETONES UR-MCNC: SIGNIFICANT CHANGE UP
LDH SERPL L TO P-CCNC: 301 U/L — HIGH (ref 50–242)
LEUKOCYTE ESTERASE UR-ACNC: NEGATIVE — SIGNIFICANT CHANGE UP
LIDOCAIN IGE QN: 96 U/L — HIGH (ref 7–60)
LYMPHOCYTES # BLD AUTO: 0.74 K/UL — LOW (ref 1–3.3)
LYMPHOCYTES # BLD AUTO: 11.7 % — LOW (ref 13–44)
MCHC RBC-ENTMCNC: 26.5 PG — LOW (ref 27–34)
MCHC RBC-ENTMCNC: 30.8 GM/DL — LOW (ref 32–36)
MCV RBC AUTO: 86.1 FL — SIGNIFICANT CHANGE UP (ref 80–100)
MONOCYTES # BLD AUTO: 0.68 K/UL — SIGNIFICANT CHANGE UP (ref 0–0.9)
MONOCYTES NFR BLD AUTO: 10.8 % — SIGNIFICANT CHANGE UP (ref 2–14)
NEUTROPHILS # BLD AUTO: 4.83 K/UL — SIGNIFICANT CHANGE UP (ref 1.8–7.4)
NEUTROPHILS NFR BLD AUTO: 76.7 % — SIGNIFICANT CHANGE UP (ref 43–77)
NITRITE UR-MCNC: NEGATIVE — SIGNIFICANT CHANGE UP
NRBC # BLD: 0 /100 WBCS — SIGNIFICANT CHANGE UP (ref 0–0)
NT-PROBNP SERPL-SCNC: 204 PG/ML — SIGNIFICANT CHANGE UP (ref 0–300)
PH UR: 6 — SIGNIFICANT CHANGE UP (ref 5–8)
PLATELET # BLD AUTO: 285 K/UL — SIGNIFICANT CHANGE UP (ref 150–400)
POTASSIUM SERPL-MCNC: 7.6 MMOL/L — CRITICAL HIGH (ref 3.5–5.3)
POTASSIUM SERPL-SCNC: 7.6 MMOL/L — CRITICAL HIGH (ref 3.5–5.3)
PROCALCITONIN SERPL-MCNC: 1.32 NG/ML — HIGH (ref 0.02–0.1)
PROT SERPL-MCNC: 8.7 G/DL — HIGH (ref 6–8.3)
PROT UR-MCNC: 100 — SIGNIFICANT CHANGE UP
PROTHROM AB SERPL-ACNC: 12 SEC — SIGNIFICANT CHANGE UP (ref 10–12.9)
RBC # BLD: 5.09 M/UL — SIGNIFICANT CHANGE UP (ref 3.8–5.2)
RBC # FLD: 12.6 % — SIGNIFICANT CHANGE UP (ref 10.3–14.5)
SODIUM SERPL-SCNC: 125 MMOL/L — LOW (ref 135–145)
SP GR SPEC: 1.03 — HIGH (ref 1.01–1.02)
TROPONIN T, HIGH SENSITIVITY RESULT: 7 NG/L — SIGNIFICANT CHANGE UP (ref 0–51)
UROBILINOGEN FLD QL: NEGATIVE — SIGNIFICANT CHANGE UP
WBC # BLD: 6.3 K/UL — SIGNIFICANT CHANGE UP (ref 3.8–10.5)
WBC # FLD AUTO: 6.3 K/UL — SIGNIFICANT CHANGE UP (ref 3.8–10.5)

## 2020-04-17 PROCEDURE — 71045 X-RAY EXAM CHEST 1 VIEW: CPT | Mod: 26

## 2020-04-17 PROCEDURE — 93010 ELECTROCARDIOGRAM REPORT: CPT

## 2020-04-17 PROCEDURE — 99291 CRITICAL CARE FIRST HOUR: CPT

## 2020-04-17 RX ORDER — ACETAMINOPHEN 500 MG
650 TABLET ORAL ONCE
Refills: 0 | Status: COMPLETED | OUTPATIENT
Start: 2020-04-17 | End: 2020-04-17

## 2020-04-17 RX ORDER — ONDANSETRON 8 MG/1
4 TABLET, FILM COATED ORAL ONCE
Refills: 0 | Status: COMPLETED | OUTPATIENT
Start: 2020-04-17 | End: 2020-04-17

## 2020-04-17 RX ORDER — SODIUM CHLORIDE 9 MG/ML
250 INJECTION INTRAMUSCULAR; INTRAVENOUS; SUBCUTANEOUS ONCE
Refills: 0 | Status: DISCONTINUED | OUTPATIENT
Start: 2020-04-17 | End: 2020-04-18

## 2020-04-17 RX ADMIN — Medication 650 MILLIGRAM(S): at 22:47

## 2020-04-17 RX ADMIN — ONDANSETRON 4 MILLIGRAM(S): 8 TABLET, FILM COATED ORAL at 22:47

## 2020-04-17 NOTE — ED ADULT NURSE NOTE - OBJECTIVE STATEMENT
49F to ED c/o SOB, cough, fever and abd pain, body aches. Pt c/o chest pain with coughs, nausea. Pt room air sat is 5-88%. Pt is on 4L nc in ED see VS as documented. Pt states oral swelling and white stuff on her tongue a month ago. Hx of seizure disorder well controlled with medication. Pt has 20 ga to LAC. Pt states abd pain for last 3 days. Pt is alert and oriented x4, calm/cooperative, NAD, VSS.

## 2020-04-17 NOTE — ED PROVIDER NOTE - CARE PLAN
Principal Discharge DX:	COVID-19 virus infection Principal Discharge DX:	Hyperglycemic crisis in diabetes mellitus  Secondary Diagnosis:	COVID-19 virus infection Principal Discharge DX:	Hyperglycemic crisis in diabetes mellitus  Secondary Diagnosis:	COVID-19 virus infection  Secondary Diagnosis:	Hyperkalemia

## 2020-04-17 NOTE — ED PROVIDER NOTE - OBJECTIVE STATEMENT
Delma Saavedra MD: 50yo F with PMH of seizure disorder on Vimpat and phenytoin, DM, sarcoidosis on 10mg prednisone, CKD, HTN, HLD who presents with a fever, cough, shortness of breath, generalized weakness and body aches over the last 5 days. Symptoms have been getting progressively worse. SOB is worse with exertion. Chest pain with cough. Also admits to 3 days of epigastric abdominal crampy pain relieved with multiple episodes of NBNB emesis a day. No sick contacts. Pt was hypoxic on RA to 85% requiring 4L NC.

## 2020-04-17 NOTE — ED PROVIDER NOTE - PHYSICAL EXAMINATION
CONSTITUTIONAL: Nontoxic, well nourished, well developed, middle-aged female, difficulty speaking in full sentences   HEAD: Normocephalic; atraumatic  EYES: Normal inspection, EOMI  ENMT: External appears normal; normal oropharynx  NECK: Supple; non-tender; no cervical lymphadenopathy  CARD: RRR; no audible murmurs, rubs, or gallops  RESP: tachypneic, lungs ctab/l, on 4L NC   ABD: Soft, non-distended; diffusely TTP; no rebound or guarding  EXT: No LE pitting edema or calf tenderness; distal pulses intact with good capillary refill  SKIN: Warm, dry, intact  NEURO: aaox3, moving all extremities spontaneously

## 2020-04-17 NOTE — ED PROVIDER NOTE - SHIFT CHANGE DETAILS
Ashley Coe MD - Attending Physician: PT here with likely COVID+, multiple medical complaints. Found to be severely hyperglycemic with hyperK and hypoNa. Hospitalist aware, to accept for admit once K improves and pt stabilizes

## 2020-04-17 NOTE — ED PROVIDER NOTE - PROGRESS NOTE DETAILS
[facility] giving fluids and meds for hyperglycemia and hyperK (ekg normal). Discussed pt. with hospitalist, will accept patient when K is improved. Pt. signed out to Carolin pending repeat labs. Ashley Coe MD - Attending Physician: First repeat CMP done prior to medication administration. Will repeat now that Insulin, Ca, and IVF have been given Spoke with ICU, now accepts for admission given minimal change to labs post-IVF, insulin, Ca. Admit to Dr Turpin

## 2020-04-17 NOTE — ED PROVIDER NOTE - ATTENDING CONTRIBUTION TO CARE
Cadence DO: patient seen and evaluated with the resident.  I was present for key portions of the History & Physical, and I agree with the Impression & Plan.  Cadence, DO: 48yo F w/ sarcoid, HTN HLD DM seizure disorder p/w COVID like symptoms, fever cough SOB, poor historian seems confused and uncomfortable. Also complaining of abd pain, N/V. Hypoxic in triage.  Gen: Mod distress  HEENT: Dry mucous membranes, NCAT  CV: Tachycardic  Resp: CTAB  GI: Abdomen soft, NT, ND.   : No CVAT  Neuro: A&O x 3, moving all 4 extremities  MSK: No gross abnormalities  Skin: No rashes   48yo F w/ multiple medical problems presents hypoxic with COVID like symptoms, will send labs, EKG, CXR, CT abd/pelv although nontender but in significant distress, not requiring intubation at this time, on NRB, needs admission.

## 2020-04-17 NOTE — ED PROVIDER NOTE - NS ED ROS FT
General: +fever, chills  HENT: denies nasal congestion, sore throat, rhinorrhea  Eyes: denies vision changes  CV: +chest pain  Resp: +difficulty breathing, +cough  Abdominal: +nausea, +vomiting, diarrhea, +abdominal pain, blood in stool, dark stool  : denies pain with urination  MSK: denies recent trauma  Neuro: denies headaches, numbness, tingling, dizziness, lightheadedness.  Skin: denies new rashes  Endocrine: denies recent weight loss

## 2020-04-17 NOTE — ED ADULT TRIAGE NOTE - TEMPERATURE IN CELSIUS (DEGREES C)
Quality 130: Documentation Of Current Medications In The Medical Record: Current Medications Documented Detail Level: Detailed Quality 131: Pain Assessment And Follow-Up: Pain assessment using a standardized tool is documented as negative, no follow-up plan required Quality 111:Pneumonia Vaccination Status For Older Adults: Pneumococcal Vaccination not Administered or Previously Received, Reason not Otherwise Specified 36.9 Quality 110: Preventive Care And Screening: Influenza Immunization: Influenza Immunization not Administered for Documented Reasons.

## 2020-04-17 NOTE — ED PROVIDER NOTE - CLINICAL SUMMARY MEDICAL DECISION MAKING FREE TEXT BOX
Delma Saavedra MD: 50yo F with PMH of seizure disorder on Vimpat and phenytoin, DM, sarcoidosis on 10mg prednisone, CKD, HTN, HLD who presents with a URI symptoms over 5 days with recent N/V abdominal pain and tenderness on exam concerning for COVID infection, possible pancreatitis vs gastritis vs gastroparesis. Will get labs, CXR, EKG, COVID swab, CT A/P, pain and nausea control, admit

## 2020-04-18 DIAGNOSIS — E27.8 OTHER SPECIFIED DISORDERS OF ADRENAL GLAND: ICD-10-CM

## 2020-04-18 DIAGNOSIS — E11.00 TYPE 2 DIABETES MELLITUS WITH HYPEROSMOLARITY WITHOUT NONKETOTIC HYPERGLYCEMIC-HYPEROSMOLAR COMA (NKHHC): ICD-10-CM

## 2020-04-18 DIAGNOSIS — E78.5 HYPERLIPIDEMIA, UNSPECIFIED: ICD-10-CM

## 2020-04-18 DIAGNOSIS — E11.65 TYPE 2 DIABETES MELLITUS WITH HYPERGLYCEMIA: ICD-10-CM

## 2020-04-18 DIAGNOSIS — I10 ESSENTIAL (PRIMARY) HYPERTENSION: ICD-10-CM

## 2020-04-18 LAB
A1C WITH ESTIMATED AVERAGE GLUCOSE RESULT: 14.2 % — HIGH (ref 4–5.6)
ALBUMIN SERPL ELPH-MCNC: 3 G/DL — LOW (ref 3.3–5)
ALBUMIN SERPL ELPH-MCNC: 3.1 G/DL — LOW (ref 3.3–5)
ALBUMIN SERPL ELPH-MCNC: 3.7 G/DL — SIGNIFICANT CHANGE UP (ref 3.3–5)
ALP SERPL-CCNC: 38 U/L — LOW (ref 40–120)
ALP SERPL-CCNC: 41 U/L — SIGNIFICANT CHANGE UP (ref 40–120)
ALP SERPL-CCNC: 53 U/L — SIGNIFICANT CHANGE UP (ref 40–120)
ALT FLD-CCNC: 6 U/L — LOW (ref 10–45)
ALT FLD-CCNC: 6 U/L — LOW (ref 10–45)
ALT FLD-CCNC: 8 U/L — LOW (ref 10–45)
ANION GAP SERPL CALC-SCNC: 13 MMOL/L — SIGNIFICANT CHANGE UP (ref 5–17)
ANION GAP SERPL CALC-SCNC: 13 MMOL/L — SIGNIFICANT CHANGE UP (ref 5–17)
ANION GAP SERPL CALC-SCNC: 14 MMOL/L — SIGNIFICANT CHANGE UP (ref 5–17)
ANION GAP SERPL CALC-SCNC: 16 MMOL/L — SIGNIFICANT CHANGE UP (ref 5–17)
APTT BLD: 29.4 SEC — SIGNIFICANT CHANGE UP (ref 27.5–36.3)
AST SERPL-CCNC: 14 U/L — SIGNIFICANT CHANGE UP (ref 10–40)
AST SERPL-CCNC: 16 U/L — SIGNIFICANT CHANGE UP (ref 10–40)
AST SERPL-CCNC: 19 U/L — SIGNIFICANT CHANGE UP (ref 10–40)
BILIRUB SERPL-MCNC: 0.2 MG/DL — SIGNIFICANT CHANGE UP (ref 0.2–1.2)
BILIRUB SERPL-MCNC: 0.2 MG/DL — SIGNIFICANT CHANGE UP (ref 0.2–1.2)
BILIRUB SERPL-MCNC: 0.4 MG/DL — SIGNIFICANT CHANGE UP (ref 0.2–1.2)
BUN SERPL-MCNC: 41 MG/DL — HIGH (ref 7–23)
BUN SERPL-MCNC: 46 MG/DL — HIGH (ref 7–23)
BUN SERPL-MCNC: 52 MG/DL — HIGH (ref 7–23)
BUN SERPL-MCNC: 57 MG/DL — HIGH (ref 7–23)
CALCIUM SERPL-MCNC: 10.1 MG/DL — SIGNIFICANT CHANGE UP (ref 8.4–10.5)
CALCIUM SERPL-MCNC: 9.4 MG/DL — SIGNIFICANT CHANGE UP (ref 8.4–10.5)
CALCIUM SERPL-MCNC: 9.9 MG/DL — SIGNIFICANT CHANGE UP (ref 8.4–10.5)
CALCIUM SERPL-MCNC: 9.9 MG/DL — SIGNIFICANT CHANGE UP (ref 8.4–10.5)
CHLORIDE SERPL-SCNC: 106 MMOL/L — SIGNIFICANT CHANGE UP (ref 96–108)
CHLORIDE SERPL-SCNC: 107 MMOL/L — SIGNIFICANT CHANGE UP (ref 96–108)
CHLORIDE SERPL-SCNC: 89 MMOL/L — LOW (ref 96–108)
CHLORIDE SERPL-SCNC: 97 MMOL/L — SIGNIFICANT CHANGE UP (ref 96–108)
CO2 SERPL-SCNC: 20 MMOL/L — LOW (ref 22–31)
CO2 SERPL-SCNC: 21 MMOL/L — LOW (ref 22–31)
CREAT SERPL-MCNC: 1.36 MG/DL — HIGH (ref 0.5–1.3)
CREAT SERPL-MCNC: 1.41 MG/DL — HIGH (ref 0.5–1.3)
CREAT SERPL-MCNC: 1.51 MG/DL — HIGH (ref 0.5–1.3)
CREAT SERPL-MCNC: 1.75 MG/DL — HIGH (ref 0.5–1.3)
CRP SERPL-MCNC: 11.09 MG/DL — HIGH (ref 0–0.4)
CRP SERPL-MCNC: 9.02 MG/DL — HIGH (ref 0–0.4)
D DIMER BLD IA.RAPID-MCNC: 273 NG/ML DDU — HIGH
ESTIMATED AVERAGE GLUCOSE: 361 MG/DL — HIGH (ref 68–114)
FERRITIN SERPL-MCNC: 1066 NG/ML — HIGH (ref 15–150)
FERRITIN SERPL-MCNC: 916 NG/ML — HIGH (ref 15–150)
FIBRINOGEN PPP-MCNC: 1094 MG/DL — SIGNIFICANT CHANGE UP (ref 350–510)
GLUCOSE BLDC GLUCOMTR-MCNC: 165 MG/DL — HIGH (ref 70–99)
GLUCOSE BLDC GLUCOMTR-MCNC: 174 MG/DL — HIGH (ref 70–99)
GLUCOSE BLDC GLUCOMTR-MCNC: 179 MG/DL — HIGH (ref 70–99)
GLUCOSE BLDC GLUCOMTR-MCNC: 182 MG/DL — HIGH (ref 70–99)
GLUCOSE BLDC GLUCOMTR-MCNC: 187 MG/DL — HIGH (ref 70–99)
GLUCOSE BLDC GLUCOMTR-MCNC: 194 MG/DL — HIGH (ref 70–99)
GLUCOSE BLDC GLUCOMTR-MCNC: 204 MG/DL — HIGH (ref 70–99)
GLUCOSE BLDC GLUCOMTR-MCNC: 218 MG/DL — HIGH (ref 70–99)
GLUCOSE BLDC GLUCOMTR-MCNC: 238 MG/DL — HIGH (ref 70–99)
GLUCOSE BLDC GLUCOMTR-MCNC: 259 MG/DL — HIGH (ref 70–99)
GLUCOSE BLDC GLUCOMTR-MCNC: 270 MG/DL — HIGH (ref 70–99)
GLUCOSE BLDC GLUCOMTR-MCNC: 386 MG/DL — HIGH (ref 70–99)
GLUCOSE BLDC GLUCOMTR-MCNC: 545 MG/DL — CRITICAL HIGH (ref 70–99)
GLUCOSE BLDC GLUCOMTR-MCNC: 572 MG/DL — CRITICAL HIGH (ref 70–99)
GLUCOSE SERPL-MCNC: 194 MG/DL — HIGH (ref 70–99)
GLUCOSE SERPL-MCNC: 252 MG/DL — HIGH (ref 70–99)
GLUCOSE SERPL-MCNC: 728 MG/DL — CRITICAL HIGH (ref 70–99)
GLUCOSE SERPL-MCNC: 988 MG/DL — CRITICAL HIGH (ref 70–99)
HCT VFR BLD CALC: 32.9 % — LOW (ref 34.5–45)
HCT VFR BLD CALC: 33.3 % — LOW (ref 34.5–45)
HCT VFR BLD CALC: 39.7 % — SIGNIFICANT CHANGE UP (ref 34.5–45)
HGB BLD-MCNC: 10.9 G/DL — LOW (ref 11.5–15.5)
HGB BLD-MCNC: 11.1 G/DL — LOW (ref 11.5–15.5)
HGB BLD-MCNC: 12.7 G/DL — SIGNIFICANT CHANGE UP (ref 11.5–15.5)
INR BLD: 1.09 RATIO — SIGNIFICANT CHANGE UP (ref 0.88–1.16)
LDH SERPL L TO P-CCNC: 266 U/L — HIGH (ref 50–242)
MAGNESIUM SERPL-MCNC: 2.6 MG/DL — SIGNIFICANT CHANGE UP (ref 1.6–2.6)
MCHC RBC-ENTMCNC: 26.7 PG — LOW (ref 27–34)
MCHC RBC-ENTMCNC: 27.3 PG — SIGNIFICANT CHANGE UP (ref 27–34)
MCHC RBC-ENTMCNC: 27.6 PG — SIGNIFICANT CHANGE UP (ref 27–34)
MCHC RBC-ENTMCNC: 32 GM/DL — SIGNIFICANT CHANGE UP (ref 32–36)
MCHC RBC-ENTMCNC: 32.7 GM/DL — SIGNIFICANT CHANGE UP (ref 32–36)
MCHC RBC-ENTMCNC: 33.7 GM/DL — SIGNIFICANT CHANGE UP (ref 32–36)
MCV RBC AUTO: 81.8 FL — SIGNIFICANT CHANGE UP (ref 80–100)
MCV RBC AUTO: 83.5 FL — SIGNIFICANT CHANGE UP (ref 80–100)
MCV RBC AUTO: 83.6 FL — SIGNIFICANT CHANGE UP (ref 80–100)
NRBC # BLD: 0 /100 WBCS — SIGNIFICANT CHANGE UP (ref 0–0)
OSMOLALITY SERPL: 334 MOSMOL/KG — HIGH (ref 275–300)
PHENYTOIN FREE SERPL-MCNC: 2.6 UG/ML — LOW (ref 10–20)
PHOSPHATE SERPL-MCNC: 3.7 MG/DL — SIGNIFICANT CHANGE UP (ref 2.5–4.5)
PLATELET # BLD AUTO: 233 K/UL — SIGNIFICANT CHANGE UP (ref 150–400)
PLATELET # BLD AUTO: 254 K/UL — SIGNIFICANT CHANGE UP (ref 150–400)
PLATELET # BLD AUTO: 275 K/UL — SIGNIFICANT CHANGE UP (ref 150–400)
POTASSIUM SERPL-MCNC: 5.3 MMOL/L — SIGNIFICANT CHANGE UP (ref 3.5–5.3)
POTASSIUM SERPL-MCNC: 5.4 MMOL/L — HIGH (ref 3.5–5.3)
POTASSIUM SERPL-MCNC: 5.9 MMOL/L — HIGH (ref 3.5–5.3)
POTASSIUM SERPL-MCNC: 6.9 MMOL/L — CRITICAL HIGH (ref 3.5–5.3)
POTASSIUM SERPL-SCNC: 5.3 MMOL/L — SIGNIFICANT CHANGE UP (ref 3.5–5.3)
POTASSIUM SERPL-SCNC: 5.4 MMOL/L — HIGH (ref 3.5–5.3)
POTASSIUM SERPL-SCNC: 5.9 MMOL/L — HIGH (ref 3.5–5.3)
POTASSIUM SERPL-SCNC: 6.9 MMOL/L — CRITICAL HIGH (ref 3.5–5.3)
PROCALCITONIN SERPL-MCNC: 1.22 NG/ML — HIGH (ref 0.02–0.1)
PROT SERPL-MCNC: 6.8 G/DL — SIGNIFICANT CHANGE UP (ref 6–8.3)
PROT SERPL-MCNC: 7 G/DL — SIGNIFICANT CHANGE UP (ref 6–8.3)
PROT SERPL-MCNC: 8 G/DL — SIGNIFICANT CHANGE UP (ref 6–8.3)
PROTHROM AB SERPL-ACNC: 12.5 SEC — SIGNIFICANT CHANGE UP (ref 10–12.9)
RBC # BLD: 3.99 M/UL — SIGNIFICANT CHANGE UP (ref 3.8–5.2)
RBC # BLD: 4.02 M/UL — SIGNIFICANT CHANGE UP (ref 3.8–5.2)
RBC # BLD: 4.75 M/UL — SIGNIFICANT CHANGE UP (ref 3.8–5.2)
RBC # FLD: 12.2 % — SIGNIFICANT CHANGE UP (ref 10.3–14.5)
RBC # FLD: 12.3 % — SIGNIFICANT CHANGE UP (ref 10.3–14.5)
RBC # FLD: 12.3 % — SIGNIFICANT CHANGE UP (ref 10.3–14.5)
SARS-COV-2 RNA SPEC QL NAA+PROBE: DETECTED
SODIUM SERPL-SCNC: 126 MMOL/L — LOW (ref 135–145)
SODIUM SERPL-SCNC: 132 MMOL/L — LOW (ref 135–145)
SODIUM SERPL-SCNC: 140 MMOL/L — SIGNIFICANT CHANGE UP (ref 135–145)
SODIUM SERPL-SCNC: 140 MMOL/L — SIGNIFICANT CHANGE UP (ref 135–145)
TROPONIN T, HIGH SENSITIVITY RESULT: <6 NG/L — SIGNIFICANT CHANGE UP (ref 0–51)
WBC # BLD: 6.11 K/UL — SIGNIFICANT CHANGE UP (ref 3.8–10.5)
WBC # BLD: 8.3 K/UL — SIGNIFICANT CHANGE UP (ref 3.8–10.5)
WBC # BLD: 9.42 K/UL — SIGNIFICANT CHANGE UP (ref 3.8–10.5)
WBC # FLD AUTO: 6.11 K/UL — SIGNIFICANT CHANGE UP (ref 3.8–10.5)
WBC # FLD AUTO: 8.3 K/UL — SIGNIFICANT CHANGE UP (ref 3.8–10.5)
WBC # FLD AUTO: 9.42 K/UL — SIGNIFICANT CHANGE UP (ref 3.8–10.5)

## 2020-04-18 PROCEDURE — 74176 CT ABD & PELVIS W/O CONTRAST: CPT | Mod: 26

## 2020-04-18 PROCEDURE — 99223 1ST HOSP IP/OBS HIGH 75: CPT

## 2020-04-18 PROCEDURE — 99291 CRITICAL CARE FIRST HOUR: CPT

## 2020-04-18 RX ORDER — INSULIN HUMAN 100 [IU]/ML
2 INJECTION, SOLUTION SUBCUTANEOUS
Qty: 100 | Refills: 0 | Status: DISCONTINUED | OUTPATIENT
Start: 2020-04-18 | End: 2020-04-18

## 2020-04-18 RX ORDER — IPRATROPIUM/ALBUTEROL SULFATE 18-103MCG
3 AEROSOL WITH ADAPTER (GRAM) INHALATION
Refills: 0 | Status: COMPLETED | OUTPATIENT
Start: 2020-04-18 | End: 2020-04-18

## 2020-04-18 RX ORDER — LACOSAMIDE 50 MG/1
150 TABLET ORAL
Refills: 0 | Status: DISCONTINUED | OUTPATIENT
Start: 2020-04-18 | End: 2020-04-27

## 2020-04-18 RX ORDER — GLUCAGON INJECTION, SOLUTION 0.5 MG/.1ML
1 INJECTION, SOLUTION SUBCUTANEOUS ONCE
Refills: 0 | Status: DISCONTINUED | OUTPATIENT
Start: 2020-04-18 | End: 2020-04-27

## 2020-04-18 RX ORDER — SODIUM CHLORIDE 9 MG/ML
1000 INJECTION INTRAMUSCULAR; INTRAVENOUS; SUBCUTANEOUS ONCE
Refills: 0 | Status: COMPLETED | OUTPATIENT
Start: 2020-04-18 | End: 2020-04-18

## 2020-04-18 RX ORDER — HUMAN INSULIN 100 [IU]/ML
15 INJECTION, SUSPENSION SUBCUTANEOUS ONCE
Refills: 0 | Status: COMPLETED | OUTPATIENT
Start: 2020-04-18 | End: 2020-04-18

## 2020-04-18 RX ORDER — DEXTROSE 50 % IN WATER 50 %
25 SYRINGE (ML) INTRAVENOUS ONCE
Refills: 0 | Status: DISCONTINUED | OUTPATIENT
Start: 2020-04-18 | End: 2020-04-27

## 2020-04-18 RX ORDER — INSULIN LISPRO 100/ML
VIAL (ML) SUBCUTANEOUS AT BEDTIME
Refills: 0 | Status: DISCONTINUED | OUTPATIENT
Start: 2020-04-18 | End: 2020-04-22

## 2020-04-18 RX ORDER — SODIUM CHLORIDE 9 MG/ML
1000 INJECTION, SOLUTION INTRAVENOUS
Refills: 0 | Status: DISCONTINUED | OUTPATIENT
Start: 2020-04-18 | End: 2020-04-18

## 2020-04-18 RX ORDER — LOSARTAN POTASSIUM 100 MG/1
50 TABLET, FILM COATED ORAL DAILY
Refills: 0 | Status: DISCONTINUED | OUTPATIENT
Start: 2020-04-18 | End: 2020-04-18

## 2020-04-18 RX ORDER — INSULIN LISPRO 100/ML
VIAL (ML) SUBCUTANEOUS
Refills: 0 | Status: DISCONTINUED | OUTPATIENT
Start: 2020-04-18 | End: 2020-04-22

## 2020-04-18 RX ORDER — HYDROXYCHLOROQUINE SULFATE 200 MG
TABLET ORAL
Refills: 0 | Status: COMPLETED | OUTPATIENT
Start: 2020-04-18 | End: 2020-04-22

## 2020-04-18 RX ORDER — PANTOPRAZOLE SODIUM 20 MG/1
40 TABLET, DELAYED RELEASE ORAL DAILY
Refills: 0 | Status: DISCONTINUED | OUTPATIENT
Start: 2020-04-18 | End: 2020-04-21

## 2020-04-18 RX ORDER — AMLODIPINE BESYLATE 2.5 MG/1
10 TABLET ORAL DAILY
Refills: 0 | Status: DISCONTINUED | OUTPATIENT
Start: 2020-04-18 | End: 2020-04-19

## 2020-04-18 RX ORDER — INSULIN GLARGINE 100 [IU]/ML
40 INJECTION, SOLUTION SUBCUTANEOUS ONCE
Refills: 0 | Status: DISCONTINUED | OUTPATIENT
Start: 2020-04-18 | End: 2020-04-18

## 2020-04-18 RX ORDER — DEXTROSE 50 % IN WATER 50 %
15 SYRINGE (ML) INTRAVENOUS ONCE
Refills: 0 | Status: DISCONTINUED | OUTPATIENT
Start: 2020-04-18 | End: 2020-04-27

## 2020-04-18 RX ORDER — HYDROXYCHLOROQUINE SULFATE 200 MG
400 TABLET ORAL EVERY 24 HOURS
Refills: 0 | Status: COMPLETED | OUTPATIENT
Start: 2020-04-19 | End: 2020-04-22

## 2020-04-18 RX ORDER — SODIUM ZIRCONIUM CYCLOSILICATE 10 G/10G
10 POWDER, FOR SUSPENSION ORAL ONCE
Refills: 0 | Status: COMPLETED | OUTPATIENT
Start: 2020-04-18 | End: 2020-04-18

## 2020-04-18 RX ORDER — HYDROXYCHLOROQUINE SULFATE 200 MG
800 TABLET ORAL EVERY 24 HOURS
Refills: 0 | Status: COMPLETED | OUTPATIENT
Start: 2020-04-18 | End: 2020-04-18

## 2020-04-18 RX ORDER — SODIUM CHLORIDE 9 MG/ML
1000 INJECTION, SOLUTION INTRAVENOUS
Refills: 0 | Status: DISCONTINUED | OUTPATIENT
Start: 2020-04-18 | End: 2020-04-27

## 2020-04-18 RX ORDER — DEXTROSE 50 % IN WATER 50 %
12.5 SYRINGE (ML) INTRAVENOUS ONCE
Refills: 0 | Status: DISCONTINUED | OUTPATIENT
Start: 2020-04-18 | End: 2020-04-27

## 2020-04-18 RX ORDER — INSULIN HUMAN 100 [IU]/ML
10 INJECTION, SOLUTION SUBCUTANEOUS ONCE
Refills: 0 | Status: COMPLETED | OUTPATIENT
Start: 2020-04-18 | End: 2020-04-18

## 2020-04-18 RX ORDER — CALCIUM GLUCONATE 100 MG/ML
2 VIAL (ML) INTRAVENOUS ONCE
Refills: 0 | Status: COMPLETED | OUTPATIENT
Start: 2020-04-18 | End: 2020-04-18

## 2020-04-18 RX ORDER — HEPARIN SODIUM 5000 [USP'U]/ML
5000 INJECTION INTRAVENOUS; SUBCUTANEOUS EVERY 12 HOURS
Refills: 0 | Status: DISCONTINUED | OUTPATIENT
Start: 2020-04-18 | End: 2020-04-19

## 2020-04-18 RX ORDER — INSULIN LISPRO 100/ML
8 VIAL (ML) SUBCUTANEOUS
Refills: 0 | Status: DISCONTINUED | OUTPATIENT
Start: 2020-04-18 | End: 2020-04-20

## 2020-04-18 RX ORDER — CHLORHEXIDINE GLUCONATE 213 G/1000ML
1 SOLUTION TOPICAL
Refills: 0 | Status: DISCONTINUED | OUTPATIENT
Start: 2020-04-18 | End: 2020-04-19

## 2020-04-18 RX ORDER — INSULIN GLARGINE 100 [IU]/ML
30 INJECTION, SOLUTION SUBCUTANEOUS AT BEDTIME
Refills: 0 | Status: DISCONTINUED | OUTPATIENT
Start: 2020-04-18 | End: 2020-04-19

## 2020-04-18 RX ORDER — GABAPENTIN 400 MG/1
300 CAPSULE ORAL AT BEDTIME
Refills: 0 | Status: DISCONTINUED | OUTPATIENT
Start: 2020-04-18 | End: 2020-04-27

## 2020-04-18 RX ORDER — INSULIN GLARGINE 100 [IU]/ML
10 INJECTION, SOLUTION SUBCUTANEOUS ONCE
Refills: 0 | Status: COMPLETED | OUTPATIENT
Start: 2020-04-18 | End: 2020-04-18

## 2020-04-18 RX ADMIN — SODIUM CHLORIDE 1000 MILLILITER(S): 9 INJECTION INTRAMUSCULAR; INTRAVENOUS; SUBCUTANEOUS at 01:23

## 2020-04-18 RX ADMIN — Medication 2: at 17:44

## 2020-04-18 RX ADMIN — Medication 10 MILLIGRAM(S): at 06:37

## 2020-04-18 RX ADMIN — HEPARIN SODIUM 5000 UNIT(S): 5000 INJECTION INTRAVENOUS; SUBCUTANEOUS at 17:57

## 2020-04-18 RX ADMIN — Medication 800 MILLIGRAM(S): at 11:29

## 2020-04-18 RX ADMIN — PANTOPRAZOLE SODIUM 40 MILLIGRAM(S): 20 TABLET, DELAYED RELEASE ORAL at 11:29

## 2020-04-18 RX ADMIN — Medication 2 GRAM(S): at 01:22

## 2020-04-18 RX ADMIN — INSULIN GLARGINE 10 UNIT(S): 100 INJECTION, SOLUTION SUBCUTANEOUS at 00:36

## 2020-04-18 RX ADMIN — INSULIN GLARGINE 30 UNIT(S): 100 INJECTION, SOLUTION SUBCUTANEOUS at 22:12

## 2020-04-18 RX ADMIN — Medication 8 UNIT(S): at 17:44

## 2020-04-18 RX ADMIN — LACOSAMIDE 150 MILLIGRAM(S): 50 TABLET ORAL at 17:57

## 2020-04-18 RX ADMIN — HUMAN INSULIN 15 UNIT(S): 100 INJECTION, SUSPENSION SUBCUTANEOUS at 13:26

## 2020-04-18 RX ADMIN — CHLORHEXIDINE GLUCONATE 1 APPLICATION(S): 213 SOLUTION TOPICAL at 06:44

## 2020-04-18 RX ADMIN — SODIUM CHLORIDE 1000 MILLILITER(S): 9 INJECTION INTRAMUSCULAR; INTRAVENOUS; SUBCUTANEOUS at 00:11

## 2020-04-18 RX ADMIN — INSULIN HUMAN 5 UNIT(S)/HR: 100 INJECTION, SOLUTION SUBCUTANEOUS at 02:58

## 2020-04-18 RX ADMIN — GABAPENTIN 300 MILLIGRAM(S): 400 CAPSULE ORAL at 22:12

## 2020-04-18 RX ADMIN — INSULIN HUMAN 10 UNIT(S): 100 INJECTION, SOLUTION SUBCUTANEOUS at 00:10

## 2020-04-18 RX ADMIN — Medication 300 MILLIGRAM(S): at 11:29

## 2020-04-18 RX ADMIN — HEPARIN SODIUM 5000 UNIT(S): 5000 INJECTION INTRAVENOUS; SUBCUTANEOUS at 02:57

## 2020-04-18 RX ADMIN — LACOSAMIDE 150 MILLIGRAM(S): 50 TABLET ORAL at 06:37

## 2020-04-18 RX ADMIN — Medication 200 GRAM(S): at 00:36

## 2020-04-18 RX ADMIN — SODIUM CHLORIDE 75 MILLILITER(S): 9 INJECTION, SOLUTION INTRAVENOUS at 06:37

## 2020-04-18 RX ADMIN — AMLODIPINE BESYLATE 10 MILLIGRAM(S): 2.5 TABLET ORAL at 06:37

## 2020-04-18 NOTE — CONSULT NOTE ADULT - SUBJECTIVE AND OBJECTIVE BOX
HPI:  48 y/o F w/ hx of Type 2 DM x 10 years complicated by neuropathy on Lantus 37 units and Humalog 20 with meals. Recently nonadherent with hyperglycemia. No hypoglycemia. Has had polyuria and polydipsia. Also with nausea and vomiting. Mother with Type 2 DM.  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  Per outpatient notes pt on prednisone 15 daily.  Pt recently given fluconazole and nystatin for concern for oral candidiasis.      PAST MEDICAL & SURGICAL HISTORY:  Seizure disorder  Chronic kidney disease (CKD), stage III (moderate)  Hyperlipidemia  Hypertension  Diabetes  Sarcoidosis  No significant past surgical history      FAMILY HISTORY:  Mother- Type 2 DM      Social History: No tobacco or alcohol use    Outpatient Medications:  · 	predniSONE 5 mg oral tablet: 3 tab(s) orally once a day  · 	levoFLOXacin 500 mg oral tablet: 1 tab(s) orally every 24 hours  · 	HumaLOG 100 units/mL injectable solution: 20 unit(s) subcutaneous 3 times a day (before meals)  · 	Lantus Pen 100 units/mL subcutaneous solution: 37 unit(s) subcutaneous once a day (at bedtime)  · 	docusate sodium 100 mg oral capsule: 1 cap(s) orally 2 times a day  · 	pantoprazole 40 mg oral delayed release tablet: 1 tab(s) orally once a day (before a meal)  · 	Vimpat 150 mg oral tablet: 1 tab(s) orally 2 times a day  · 	amLODIPine 10 mg oral tablet: 1 tab(s) orally once a day  · 	atorvastatin 40 mg oral tablet: 1 tab(s) orally once a day  · 	phenytoin 100 mg oral capsule: 3 cap(s) (300mg) orally once a day  · 	losartan 25 mg oral tablet: 1 tab(s) orally once a day  · 	escitalopram 10 mg oral tablet: 1 tab(s) orally 2 times a day    MEDICATIONS  (STANDING):  amLODIPine   Tablet 10 milliGRAM(s) Oral daily  chlorhexidine 4% Liquid 1 Application(s) Topical <User Schedule>  dextrose 5% + sodium chloride 0.45% 1000 milliLiter(s) (75 mL/Hr) IV Continuous <Continuous>  dextrose 5%. 1000 milliLiter(s) (50 mL/Hr) IV Continuous <Continuous>  dextrose 50% Injectable 12.5 Gram(s) IV Push once  dextrose 50% Injectable 25 Gram(s) IV Push once  dextrose 50% Injectable 25 Gram(s) IV Push once  gabapentin 300 milliGRAM(s) Oral at bedtime  heparin   Injectable 5000 Unit(s) SubCutaneous every 12 hours  hydroxychloroquine   Oral   insulin glargine Injectable (LANTUS) 30 Unit(s) SubCutaneous at bedtime  insulin lispro Injectable (HumaLOG) 8 Unit(s) SubCutaneous three times a day before meals  insulin regular Infusion 2 Unit(s)/Hr (2 mL/Hr) IV Continuous <Continuous>  lacosamide 150 milliGRAM(s) Oral two times a day  pantoprazole  Injectable 40 milliGRAM(s) IV Push daily  phenytoin   Capsule 300 milliGRAM(s) Oral daily  predniSONE   Tablet 10 milliGRAM(s) Oral daily    MEDICATIONS  (PRN):  dextrose 40% Gel 15 Gram(s) Oral once PRN Blood Glucose LESS THAN 70 milliGRAM(s)/deciliter  glucagon  Injectable 1 milliGRAM(s) IntraMuscular once PRN Glucose LESS THAN 70 milligrams/deciliter      Allergies    No Known Allergies    Intolerances      Review of Systems:  Constitutional: + fever, No change in weight  Eyes: No blurry vision  Neuro: No headache, No paresthesias  HEENT: No throat pain  Cardiovascular: No chest pain  Respiratory: +SOB +cough  GI: + nausea and vomiting  : + polyuria  Skin: no rash  Psych: no depression  Endocrine: + polydipsia, No heat or cold intolerance, rest as noted in HPI  Hem/lymph: no swelling    All other review of systems negative      PHYSICAL EXAM:  VITALS: T(C): 37.3 (04-18-20 @ 12:00)  T(F): 99.2 (04-18-20 @ 12:00), Max: 99.2 (04-18-20 @ 12:00)  HR: 100 (04-18-20 @ 12:00) (97 - 111)  BP: 114/73 (04-18-20 @ 12:00) (109/71 - 141/97)  RR:  (20 - 32)  SpO2:  (85% - 99%)  Wt(kg): --  Deferred. Please refer to [facility] 4/18/2020      POCT Blood Glucose.: 187 mg/dL (04-18-20 @ 15:21)  POCT Blood Glucose.: 182 mg/dL (04-18-20 @ 14:15)  POCT Blood Glucose.: 174 mg/dL (04-18-20 @ 13:07)  POCT Blood Glucose.: 194 mg/dL (04-18-20 @ 12:09)  POCT Blood Glucose.: 218 mg/dL (04-18-20 @ 11:11)  POCT Blood Glucose.: 270 mg/dL (04-18-20 @ 10:00)  POCT Blood Glucose.: 259 mg/dL (04-18-20 @ 09:15)  POCT Blood Glucose.: 238 mg/dL (04-18-20 @ 08:23)  POCT Blood Glucose.: 386 mg/dL (04-18-20 @ 06:23)  POCT Blood Glucose.: 545 mg/dL (04-18-20 @ 05:07)  POCT Blood Glucose.: 572 mg/dL (04-18-20 @ 04:17)  POCT Blood Glucose.: >600 mg/dL (04-17-20 @ 23:55)                              10.9   6.11  )-----------( 233      ( 18 Apr 2020 15:32 )             33.3       04-18    140  |  106  |  46<H>  ----------------------------<  252<H>  5.4<H>   |  21<L>  |  1.41<H>    EGFR if : 51<L>  EGFR if non : 44<L>    Ca    9.9      04-18  Mg     2.6     04-18  Phos  3.7     04-18    TPro  7.0  /  Alb  3.1<L>  /  TBili  0.2  /  DBili  x   /  AST  16  /  ALT  8<L>  /  AlkPhos  41  04-18    Thyroid Function Tests:            Radiology:

## 2020-04-18 NOTE — CONSULT NOTE ADULT - ASSESSMENT
50 y/o F w/ severely uncontrolled Type 2 DM w/ hyperglycemia complicated by nephropathy, HTN, HLD, on chronic steroids for sarcoid admitted with hyperglycemia and COVID with glucose values > 1000 on admission (high risk patient with severely uncontrolled diabetes with glucose level >1000 with COVID >60 min spent in the coordination and care for this patient via telephone interview and discussion of diabetes and management of HHS).

## 2020-04-18 NOTE — ED ADULT NURSE REASSESSMENT NOTE - NS ED NURSE REASSESS COMMENT FT1
Insulin gtt started at 5units (5mls/hr) as per orders, verified by 2nd RN. glucometer unable to read glucose, will pass on to MICU team to f/u serum. VSS with pt w/o c/o, awaiting available inpt bed

## 2020-04-18 NOTE — CHART NOTE - NSCHARTNOTEFT_GEN_A_CORE
Nutrition Initial Assessment    Source: medical record, communication with team. Unable to speak to pt due to current airborne isolation contact precautions related to COVID-19. Telephone interview inappropriate at time given current medical condition in ICU setting.     Admitting Diagnosis: Pt is a 49y Female admitted for Hyperglycemic crisis in diabetes mellitus, found to be covid-19 positive.      Past Medical History:   Seizure disorder  Chronic kidney disease (CKD), stage III (moderate)  Hyperlipidemia  Hypertension  Sarcoidosis  Diabetes --  PTA DM medically managed with Humalog 5 units 3 times a day pre-meals, Basaglar Kwikpen 18 units once a day at bedtime. Noted also takes prednisone for sarcoidosis 10mg daily. Currently no HbA1c available. Most recent 8/9/2019, 11.2%. Unable to assess therapeutic diet adherence at this time.     Food Allergies: none per chart    Diet Order: Diet, NPO:   Except Medications (04-18-20 @ 03:10)    Nutrition Events:   - Per ED provider note, admitted with nausea with multiple episodes NBNB emesis daily, diarrhea, abdominal pain, blood in stool, dark stool. Denied recent weight loss at time of admission.   - Admitted with HHS, blood sugars between 238 to >600 mg/dL since admission; addressed with insulin infusion at this time   - Admitted with hyponatremia; now resolved, Na 140 (4/18; 9:02)  - Noted with persistent hyperkalemia; most recent 5.4 (4/18; 9:02); trending down since admit  - Noted order for lactated ringers @ 75ml/hr   - Currently no weight/height in chart at this time. Per previous RD note, pt weighed 56.2 kg at 60 inches on 1/30/2019 (~1.5 years ago).     GI per chart:   - no BM documented at this time; suspect PTA  - not currently ordered for bowel regimen    Anthropometric Measurements:  Unable to assess current weight/height at this time  Using height per previous RD note, height 60 inches, IBW: 100 pounds +/-10%  %IBW: unable to assess at this time without current weight   Unable to visually assess pt or conduct nutrition-focused physical exam at this time due to current airborne/isolation precautions related to COVID-19.    Medications: MEDICATIONS  (STANDING):  amLODIPine   Tablet 10 milliGRAM(s) Oral daily  chlorhexidine 4% Liquid 1 Application(s) Topical <User Schedule>  dextrose 5%. 1000 milliLiter(s) (50 mL/Hr) IV Continuous <Continuous>  dextrose 50% Injectable 12.5 Gram(s) IV Push once  dextrose 50% Injectable 25 Gram(s) IV Push once  dextrose 50% Injectable 25 Gram(s) IV Push once  gabapentin 300 milliGRAM(s) Oral at bedtime  heparin   Injectable 5000 Unit(s) SubCutaneous every 12 hours  hydroxychloroquine   Oral   hydroxychloroquine 800 milliGRAM(s) Oral every 24 hours  insulin regular Infusion 5 Unit(s)/Hr (5 mL/Hr) IV Continuous <Continuous>  lacosamide 150 milliGRAM(s) Oral two times a day  lactated ringers. 1000 milliLiter(s) (75 mL/Hr) IV Continuous <Continuous>  pantoprazole  Injectable 40 milliGRAM(s) IV Push daily  phenytoin   Capsule 300 milliGRAM(s) Oral daily  predniSONE   Tablet 10 milliGRAM(s) Oral daily  sodium zirconium cyclosilicate 10 Gram(s) Oral once    MEDICATIONS  (PRN):  dextrose 40% Gel 15 Gram(s) Oral once PRN Blood Glucose LESS THAN 70 milliGRAM(s)/deciliter  glucagon  Injectable 1 milliGRAM(s) IntraMuscular once PRN Glucose LESS THAN 70 milligrams/deciliter    Labs: 04-18 @ 09:02: Sodium --, Potassium --, Calcium --, Magnesium --, Phosphorus --, BUN --, Creatinine --, Glucose --, Alk Phos --, ALT/SGPT --, AST/SGOT --, Albumin --, Prealbumin --, Total Bilirubin --, Hemoglobin 11.1<L>, Hematocrit 32.9<L>, Ferritin --, C-Reactive Protein --, Creatine Kinase <<27>  04-18 @ 06:26: Sodium --, Potassium --, Calcium --, Magnesium 2.6, Phosphorus 3.7, BUN --, Creatinine --, Glucose --, Alk Phos --, ALT/SGPT --, AST/SGOT --, Albumin --, Prealbumin --, Total Bilirubin --, Hemoglobin 12.7, Hematocrit 39.7, Ferritin --, C-Reactive Protein --, Creatine Kinase <<27>  04-18 @ 02:09: Sodium 132<L>, Potassium 5.9<H>, Calcium 10.1, Magnesium --, Phosphorus --, BUN 52<H>, Creatinine 1.51<H>, Glucose 728<HH>, Alk Phos --, ALT/SGPT --, AST/SGOT --, Albumin --, Prealbumin --, Total Bilirubin --, Hemoglobin --, Hematocrit --, Ferritin --, C-Reactive Protein --, Creatine Kinase <<27>  04-18 @ 01:13: Sodium --, Potassium --, Calcium --, Magnesium --, Phosphorus --, BUN --, Creatinine --, Glucose --, Alk Phos --, ALT/SGPT --, AST/SGOT --, Albumin --, Prealbumin --, Total Bilirubin --, Hemoglobin --, Hematocrit --, Ferritin 1066<H>, C-Reactive Protein 11.09<H>, Creatine Kinase <<27>  04-18 @ 00:29: Sodium 126<L>, Potassium 6.9<HH>, Calcium 9.9, Magnesium --, Phosphorus --, BUN 57<H>, Creatinine 1.75<H>, Glucose 988<HH>, Alk Phos 53, ALT/SGPT 6<L>, AST/SGOT 14, Albumin 3.7, Prealbumin --, Total Bilirubin 0.4, Hemoglobin --, Hematocrit --, Ferritin --, C-Reactive Protein --, Creatine Kinase <<27>  04-17 @ 22:57: Sodium 125<L>, Potassium 7.6<HH>, Calcium 10.1, Magnesium --, Phosphorus --, BUN 56<H>, Creatinine 1.82<H>, Glucose 1039<HH>, Alk Phos 53, ALT/SGPT 9<L>, AST/SGOT 16, Albumin 3.9, Prealbumin --, Total Bilirubin 0.4, Hemoglobin 13.5, Hematocrit 43.8, Ferritin --, C-Reactive Protein --, Creatine Kinase <<27>      POCT Blood Glucose.: 259 mg/dL (04-18-20 @ 09:15)  POCT Blood Glucose.: 238 mg/dL (04-18-20 @ 08:23)  POCT Blood Glucose.: 386 mg/dL (04-18-20 @ 06:23)  POCT Blood Glucose.: 545 mg/dL (04-18-20 @ 05:07)  POCT Blood Glucose.: 572 mg/dL (04-18-20 @ 04:17)  POCT Blood Glucose.: >600 mg/dL (04-17-20 @ 23:55)    Skin: free of pressure injuries   Edema: none noted    Estimated Needs:   Using  pounds/45.5 kg  Energy (25-30 kcal/kg): 2798-8178 kcal/day  Protein (1.0-1.2 gm/kg): 45.5-54.6 gm/day    Nutrition Diagnosis: Altered nutrition related lab values related to endocrine dysfunction, currently medical illness, as evidenced by [facility]    Goal: Pt to maintain euglycemia 140-180 mg/dL    Recommended Interventions:   1) Continue NPO as medically necessary - defer initiation of feeding to medical team  2) As feasible, initiate Consistent Carbohydrate diet - monitor need for No Concentrated Potassium restriction & need for oral nutrition supplements (Glucerna) to optimize PO intake  3) Monitor POCT blood glucose, electrolytes, renal indices   4) Obtain weight/height    Monitoring and Evaluation:   Continue to monitor nutrition provision and tolerance, weights, labs, skin integrity.   RD remains available upon request and will follow up per protocol.    Rossy Maldonado RD, CDN    Pager# [phone]

## 2020-04-18 NOTE — ED ADULT NURSE REASSESSMENT NOTE - NS ED NURSE REASSESS COMMENT FT1
Report given to accepting RN, Chelita, in PACU (MICU). H&P, ER course and pertinent clinical data reviewed. Last serum chemistry is still being resulted, RN aware of needed f/u on current glucose. Pt LOCx3, calm and cooperative with VSS. Awaiting avail resident for transfer

## 2020-04-18 NOTE — H&P ADULT - HISTORY OF PRESENT ILLNESS
50 yo F hx of sarcoidosis (dx 2010 via biopsy showing noncaseating granulomas on prednisone), seizure disorder, T2DM (A1C >15.5% in 09/2018), CKD stage 3, HTN, HLD, who presents with    Per prior documentation pt with hx noncompliance    Pt recently given fluconazole and nystatin for concern for oral candidiasis.      Pt previously noted to be taking roughly 35 Lantus and 15 Humalog TID however had episodes of hypoglycemia for which she was told to take Humalog 5 when she notes FS <120. 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  Per outpatient notes pt on prednisone 15 daily.  Pt recently given fluconazole and nystatin for concern for oral candidiasis.  Pt previously noted to be taking roughly 35 Lantus and 15 Humalog TID however had episodes of hypoglycemia for which she was told to take Humalog 5 when she notes FS <120.              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  Per outpatient notes pt on prednisone 15 daily.  Pt recently given fluconazole and nystatin for concern for oral candidiasis.  Pt previously noted to be taking roughly 35 Lantus and 15 Humalog TID however had episodes of hypoglycemia for which she was told to take Humalog 5 when she notes FS <120.    In ED  Vitals: RR 28, SaO2 85% on RA, , Tmax 98.7, /82  Labs: K+ 7.6, BMP glucose 1039, BhB 1.2, AG 17.   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

## 2020-04-18 NOTE — ED ADULT NURSE REASSESSMENT NOTE - NS ED NURSE REASSESS COMMENT FT1
Report rec'd pt seen. Criitcal K and serum glucose >900. STAT chems redrawn, Insulin and IVP up. Pt in no severe distress, en route to CT will folloow

## 2020-04-18 NOTE — H&P ADULT - ASSESSMENT
# Neuro  - pt with seziure hx on meds  - will check seizure med levels and dose renally    # Pulm  - pt found to be COVID + in the setting of underlying Pulm sarcoid with previously noted fibrosis on chronic prednisone    # Renal  - pt noted to have MIKALA with associated hyperkalemia on admission  - s/p 1 L IVF in the ER, pt will need more IVF however will be cautious in setting of COVID  - trend BMP for K, s/p Ca gluconate, insulin and lokelma  - will check EKG  - renally dose all meds    # Endo  - pt with borderline DKA/HHS on admission  - given lantus 10 initially with 10 humulin  - started on insulin gtt  - A1C pending, previously with A1C in the 11-16 range  - trend blood gas for acidosis, BHB, UA for ketones  - AM Endo consult  - Q1H FS on ISS    # GI  - NPO for now  - CT A/P w/o any acute abdominal pathology    # ID  - pt + for COVID with procalcitonin elevated to 1.32  - consider abx  - trend COVID labs  - will monitor for oral candidiasis    # DVT PPX  - HSQ 50 yo F hx of 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      # Neuro  - pt with seziure hx on meds  - will check seizure med levels and dose renally    # Pulm  - pt found to be COVID + in the setting of underlying Pulm sarcoidosis (dx 2010 via biopsy showing noncaseating granulomas on prednisone) c/b traction bronchiectasis on chronic prednisone    # Renal  - pt noted to have MIKALA with associated hyperkalemia on admission  - s/p 1 L IVF in the ER, pt will need more IVF however will be cautious in setting of COVID  - trend BMP for K, s/p Ca gluconate, insulin and lokelma  - will check EKG  - renally dose all meds    # Endo  - pt with borderline DKA/HHS on admission  - given lantus 10 initially with 10 humulin  - started on insulin gtt  - A1C pending, previously with A1C in the 11-16 range  - trend blood gas for acidosis, BHB, UA for ketones  - AM Endo consult  - Q1H FS on ISS    # GI  - NPO for now  - CT A/P w/o any acute abdominal pathology    # ID  - pt + for COVID with procalcitonin elevated to 1.32  - consider abx  - trend COVID labs  - will monitor for oral candidiasis    # DVT PPX  - HSQ 48 yo F hx of 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      # Neuro  - pt with seziure hx on meds  - will check seizure med levels and dose renally    # Pulm  - pt found to be COVID + in the setting of underlying Pulm sarcoidosis (dx 2010 via biopsy showing noncaseating granulomas on prednisone) c/b traction bronchiectasis on chronic prednisone    # Renal  - pt noted to have MIKALA with associated hyperkalemia on admission  - s/p 1 L IVF in the ER, pt will need more IVF however will be cautious in setting of COVID  - trend BMP for K, s/p Ca gluconate, insulin and lokelma  - will check EKG  - renally dose all meds    # Endo  - pt with borderline DKA/HHS on admission  - given lantus 10 initially with 10 humulin  - started on insulin gtt  - A1C pending, previously with A1C in the 11-16 range  - trend blood gas for acidosis, BHB, UA for ketones  - AM Endo consult  - Q1H FS on ISS    # GI  - NPO for now  - lipase elevated to 96, CT A/P w/o any acute abdominal pathology    # ID  - pt + for COVID with procalcitonin elevated to 1.32 and lactate 3  - consider abx  - trend COVID labs  - will monitor for oral candidiasis    # DVT PPX  - HSQ 50 yo F hx of 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      # Neuro  - pt with seziure hx on meds  - Resume home lacosamide     #CV  -Continue with home Losartan 50mg qD and Amlodipine 10qD    # Pulm  - pt found to be COVID + in the setting of underlying Pulm sarcoidosis (dx 2010 via biopsy showing noncaseating granulomas on prednisone) c/b traction bronchiectasis on chronic prednisone  - c/w home prednisone 10mg PO qD    # Renal  - pt noted to have MIKALA (1.8 Cr from baseline 1.5) with associated hyperkalemia on admission  - s/p 1 L IVF in the ER, pt will need more IVF however will be cautious in setting of COVID  - c/w 75cc/hr LR  - trend BMP q4h for K, s/p Ca gluconate, insulin and lokelma  - f/u EKG  - renally dose meds    # Endo  - pt with borderline DKA/HHS on admission  - Hold home antidiabetic medications  - given lantus 10 initially with 10 humulin  - started on insulin gtt  - Q1H FS  - A1C pending, previously with A1C in the 11-16 range  - trend blood gas for acidosis, BHB  - AM Endo consult    # GI  - NPO for now  - lipase elevated to 96, CT A/P w/o any acute abdominal pathology    # ID  - pt + for COVID with procalcitonin elevated to 1.32 and lactate 3  - trend COVID labs  - will monitor for oral candidiasis    # DVT PPX  - HSQ

## 2020-04-18 NOTE — H&P ADULT - ATTENDING COMMENTS
Patient seen and examined. Patient with complex medical history admitted to [facility]ICU (PACU) for hyperglycemia and metabolic abnormalities. She has been found to be COVID-19 positive. She has had decreased PO intake with nausea/vomiting/diarrhea for the last 5-7 days. She has not taken any insulin during this time. She continued to feel unwell prompting hospitalization. In the ED she was found to have blood sugars > 900 with a relatively normal AG.    - Hyperglycemia/HHS - continue insulin gtt and check A1c. Will give IVF as patient appears dehydrated until she is able to tolerate PO. Check serum osm. Monitor potassium and replete as necessary. Endocrine evaluation  - COVID-19 - follow Ddimer, CRP, and Ferritin. Patient is currently requiring 2-4L O2 without any evidence of respiratory distress. Will start Plaquenil. If she worsens clinically will consider Tocilizumab vs Anakinra.   - Cardiovascular - check EKG. No evidence of shock at present and patient not requiring vasopressor support.  - Neurosarcoid - continue AED. Continue Prednisone 10mg which patient is on chronically. If hypotensive may require stress dose steroid.    I have provided 40 minutes of critical care time independent of procedures and/or teaching services. Patient is critically ill requiring frequent bedside visits with therapy change

## 2020-04-18 NOTE — PROGRESS NOTE ADULT - ASSESSMENT
48yo woman h/o severe pulmonary and neurosarcoidosis with seizures, DMII nonadherent to medications, CKD III, HTN, HLD transferred to ICU for HHS mgmt likely 2/2 COVID19 infection.    Daily Labs:  CBC/CMP/Mg/Phos/CRP/PT, PTT & INR/D-Dimer/LDH/Ferritin/CK/Troponins    Every 3 day labs:   ESR/Tcell subset/D-dimer/LDH/Ferritin/CK/Troponins/Coags  COVID isolation protocol    Pulmonary:  Sepsis 2/2 COVID+  prednisone 10 daily  to review with Attending for potential treatment for COVID19    Follow Ups:  T-Cell Subset  Urinalysis  Urine Cultures  Blood Cultures  Obtain HIV consent for testing when able    Cardiovascular:    Goal MAP >65    Neurology:  no FND  seizures 2/2 neurosarcoidosis  c/w home meds dilantin 300mg daily and vimpat 150mg bid  f/u phenytoin and lacosamide levels    Gastrointestinal:  Prophylaxis  - Pantoprazole 40mg IV daily    Genitourinary:  strict I/Os while mgmt HHS    ID:  Sepsis 2/2 COVID+  consider treatment contingent upon conversation with ICU Attending    Diet: NPO until switch from insulin gtt to lantus    Code: FULL CODE    Disposition: Patient requires continued monitoring in MICU    Mgmt d/w ICU Attending 50yo woman h/o severe pulmonary and neurosarcoidosis with seizures, DMII nonadherent to medications, CKD III, HTN, HLD transferred to ICU for HHS mgmt likely 2/2 COVID19 infection.    Daily Labs:  CBC/CMP/Mg/Phos/CRP/PT, PTT & INR/D-Dimer/LDH/Ferritin/CK/Troponins    Every 3 day labs:   ESR/Tcell subset/D-dimer/LDH/Ferritin/CK/Troponins/Coags  COVID isolation protocol    Pulmonary:  Sepsis 2/2 COVID+  prednisone 10 daily  to review with Attending for potential treatment for COVID19    Follow Ups:  T-Cell Subset  Urinalysis  Urine Cultures  Blood Cultures  Obtain HIV consent for testing when able    Cardiovascular:  c/w amlodipine  stopped cozaar to prevent worsening of MIKALA  Goal MAP >65    Neurology:  no FND  seizure disorder 2/2 neurosarcoidosis  c/w home meds dilantin 300mg daily, vimpat 150mg bid, gabapentin 300mg at bedtime  f/u phenytoin and lacosamide levels    Endo:  A1c pending - known nonadherence and has documented A1c > 13 in past  [facility] management  K 5.4, changed fluids to D5 1/2 NS w/ KCl 20mEQ @ 75cc/hr ( to prevent overhydration in setting of COVID19)  Endo consult placed, f/u recs from Dr. Mars    Gastrointestinal:  Prophylaxis  - Pantoprazole 40mg IV daily    Genitourinary:  strict I/Os while mgmt [facility]    ID:  Sepsis 2/2 COVID+  plaquenil started 4/18  on daily prednisone 10mg daily for known sarcoidosis - continuing    Diet: NPO until switch from insulin gtt to lantus    Code: FULL CODE    Disposition: Patient requires continued monitoring in MICU    Mgmt d/w ICU Attending Dr. Donald, Fellow Waldemar 50yo woman h/o severe pulmonary and neurosarcoidosis with seizures, DMII nonadherent to medications, CKD III, HTN, HLD transferred to ICU for HHS mgmt likely 2/2 COVID19 infection.    Daily Labs:  CBC/CMP/Mg/Phos/CRP/PT, PTT & INR/D-Dimer/LDH/Ferritin/CK/Troponins    Every 3 day labs:   ESR/Tcell subset/D-dimer/LDH/Ferritin/CK/Troponins/Coags  COVID isolation protocol    Pulmonary:  Sepsis 2/2 COVID+  prednisone 10 daily    Follow Ups:  T-Cell Subset  Urinalysis  Urine Cultures  Blood Cultures  Obtain HIV consent for testing when able    Cardiovascular:  c/w amlodipine  stopped cozaar to prevent worsening of MIKALA  Goal MAP >65    Neurology:  no FND  seizure disorder 2/2 neurosarcoidosis  c/w home meds dilantin 300mg daily, vimpat 150mg bid, gabapentin 300mg at bedtime  f/u phenytoin and lacosamide levels    Endo:  A1c pending - known nonadherence and has documented A1c > 13 in past  [facility] management  K 5.4, changed fluids to D5 1/2 NS w/ KCl 20mEQ @ 75cc/hr ( to prevent overhydration in setting of COVID19)  Endo consult placed, f/u recs from Dr. Mars    Gastrointestinal:  Prophylaxis  - Pantoprazole 40mg IV daily    Genitourinary:  strict I/Os while mgmt [facility]    ID:  Sepsis 2/2 COVID+  plaquenil started 4/18  on daily prednisone 10mg daily for known sarcoidosis - continuing  EKG daily  QTc on last     Diet: NPO until switch from insulin gtt to lantus    Code: FULL CODE    Disposition: Patient requires continued monitoring in MICU    Mgmt d/w ICU Attending Dr. Donald, Fellow Waldemar

## 2020-04-18 NOTE — PROGRESS NOTE ADULT - SUBJECTIVE AND OBJECTIVE BOX
Admit Date: 04-18-20    Reason for admission to ICU:  48yo woman transferred to ICU for HHS mgmt    INTERVAL HPI/OVERNIGHT EVENTS:  insulin gtt, still hyperkalemic 5.9    MEDICATIONS  (STANDING):  amLODIPine   Tablet 10 milliGRAM(s) Oral daily  chlorhexidine 4% Liquid 1 Application(s) Topical <User Schedule>  dextrose 5%. 1000 milliLiter(s) (50 mL/Hr) IV Continuous <Continuous>  dextrose 50% Injectable 12.5 Gram(s) IV Push once  dextrose 50% Injectable 25 Gram(s) IV Push once  dextrose 50% Injectable 25 Gram(s) IV Push once  gabapentin 300 milliGRAM(s) Oral at bedtime  heparin   Injectable 5000 Unit(s) SubCutaneous every 12 hours  insulin regular Infusion 5 Unit(s)/Hr (5 mL/Hr) IV Continuous <Continuous>  lacosamide 150 milliGRAM(s) Oral two times a day  lactated ringers. 1000 milliLiter(s) (75 mL/Hr) IV Continuous <Continuous>  losartan 50 milliGRAM(s) Oral daily  pantoprazole  Injectable 40 milliGRAM(s) IV Push daily  phenytoin   Capsule 300 milliGRAM(s) Oral daily  predniSONE   Tablet 10 milliGRAM(s) Oral daily  sodium zirconium cyclosilicate 10 Gram(s) Oral once    MEDICATIONS  (PRN):  dextrose 40% Gel 15 Gram(s) Oral once PRN Blood Glucose LESS THAN 70 milliGRAM(s)/deciliter  glucagon  Injectable 1 milliGRAM(s) IntraMuscular once PRN Glucose LESS THAN 70 milligrams/deciliter      Vitals:  Vital Signs Last 24 Hrs  T(C): 36.7 (18 Apr 2020 04:00), Max: 37.1 (18 Apr 2020 02:47)  T(F): 98.1 (18 Apr 2020 04:00), Max: 98.7 (18 Apr 2020 02:47)  HR: 109 (18 Apr 2020 06:00) (97 - 110)  BP: 109/71 (18 Apr 2020 06:00) (109/71 - 141/97)  BP(mean): 85 (18 Apr 2020 06:00) (83 - 105)  RR: 20 (18 Apr 2020 06:00) (20 - 32)  SpO2: 94% (18 Apr 2020 06:00) (85% - 98%)    Physical Exam:      Labs:  COVID:  COVID-19 PCR: Detected (04-17-20 @ 23:09)                          12.7   9.42  )-----------( 275      ( 18 Apr 2020 06:26 )             39.7     04-18    132<L>  |  97  |  52<H>  ----------------------------<  728<HH>  5.9<H>   |  21<L>  |  1.51<H>    Ca    10.1      18 Apr 2020 02:09    TPro  8.0  /  Alb  3.7  /  TBili  0.4  /  DBili  x   /  AST  14  /  ALT  6<L>  /  AlkPhos  53  04-18    PT/INR - ( 17 Apr 2020 22:57 )   PT: 12.0 sec;   INR: 1.05 ratio         PTT - ( 17 Apr 2020 22:57 )  PTT:28.8 sec  Urinalysis Basic - ( 17 Apr 2020 23:30 )    Color: x / Appearance: x / SG: x / pH: x  Gluc: x / Ketone: x  / Bili: x / Urobili: x   Blood: x / Protein: x / Nitrite: x   Leuk Esterase: x / RBC: 412 /hpf / WBC 6 /HPF   Sq Epi: x / Non Sq Epi: 1 /hpf / Bacteria: Negative    COVID related labs:  18 Apr 2020 01:13  D-dimer:  x  Calcitonin:  x  CRP:  x  LDH:  x  Lactate,Blood:  x  CK:  x  Troponin I:  x  Troponin T:  x  Troponin HS:  x  Ferritin, Serum: 1066<H>  BNP:  x    (VENOUS):  04-17-20 @ 22:57  pH 7.25 / pCO2 55 / pO2 23 / HCO3 23  Total CO2, Venous 25  FiO2, Venous --  Oxygen Saturation 25  Blood Gas Venous - Creatinine --  Blood Gas Venous - Glucose >900  Blood Gas Venous - Hematocrit --  Blood Gas Venous Hemoglobin --  Blood Gas Venous - Lactate 3.0  Blood Gas Venous - Potassium 7.0  Blood Gas Venous - Sodium 130  Base Excess, Venous -4.1 Admit Date: 04-18-20    Reason for admission to ICU:  48yo woman transferred to ICU for HHS mgmt    INTERVAL HPI/OVERNIGHT EVENTS:  insulin gtt, still hyperkalemic 5.9    MEDICATIONS  (STANDING):  amLODIPine   Tablet 10 milliGRAM(s) Oral daily  chlorhexidine 4% Liquid 1 Application(s) Topical <User Schedule>  dextrose 5%. 1000 milliLiter(s) (50 mL/Hr) IV Continuous <Continuous>  dextrose 50% Injectable 12.5 Gram(s) IV Push once  dextrose 50% Injectable 25 Gram(s) IV Push once  dextrose 50% Injectable 25 Gram(s) IV Push once  gabapentin 300 milliGRAM(s) Oral at bedtime  heparin   Injectable 5000 Unit(s) SubCutaneous every 12 hours  insulin regular Infusion 5 Unit(s)/Hr (5 mL/Hr) IV Continuous <Continuous>  lacosamide 150 milliGRAM(s) Oral two times a day  lactated ringers. 1000 milliLiter(s) (75 mL/Hr) IV Continuous <Continuous>  losartan 50 milliGRAM(s) Oral daily  pantoprazole  Injectable 40 milliGRAM(s) IV Push daily  phenytoin   Capsule 300 milliGRAM(s) Oral daily  predniSONE   Tablet 10 milliGRAM(s) Oral daily  sodium zirconium cyclosilicate 10 Gram(s) Oral once    MEDICATIONS  (PRN):  dextrose 40% Gel 15 Gram(s) Oral once PRN Blood Glucose LESS THAN 70 milliGRAM(s)/deciliter  glucagon  Injectable 1 milliGRAM(s) IntraMuscular once PRN Glucose LESS THAN 70 milligrams/deciliter      Vitals:  Vital Signs Last 24 Hrs  T(C): 36.7 (18 Apr 2020 04:00), Max: 37.1 (18 Apr 2020 02:47)  T(F): 98.1 (18 Apr 2020 04:00), Max: 98.7 (18 Apr 2020 02:47)  HR: 109 (18 Apr 2020 06:00) (97 - 110)  BP: 109/71 (18 Apr 2020 06:00) (109/71 - 141/97)  BP(mean): 85 (18 Apr 2020 06:00) (83 - 105)  RR: 20 (18 Apr 2020 06:00) (20 - 32)  SpO2: 94% (18 Apr 2020 06:00) (85% - 98%)    Physical Exam:  MS: awake, alert, oriented to self, date, location, can name, repeat, intact comprehension  CN: PERRL, no gross facial asymmetry  Motor: grossly intact strength throughout  General: woman in bed in no acute distress  CV: S1, S2  Resp: CTAB  GI: soft, nontender, normoactive bs    Labs:  COVID:  COVID-19 PCR: Detected (04-17-20 @ 23:09)                          12.7   9.42  )-----------( 275      ( 18 Apr 2020 06:26 )             39.7     04-18    132<L>  |  97  |  52<H>  ----------------------------<  728<HH>  5.9<H>   |  21<L>  |  1.51<H>    Ca    10.1      18 Apr 2020 02:09    TPro  8.0  /  Alb  3.7  /  TBili  0.4  /  DBili  x   /  AST  14  /  ALT  6<L>  /  AlkPhos  53  04-18    PT/INR - ( 17 Apr 2020 22:57 )   PT: 12.0 sec;   INR: 1.05 ratio         PTT - ( 17 Apr 2020 22:57 )  PTT:28.8 sec  Urinalysis Basic - ( 17 Apr 2020 23:30 )    Color: x / Appearance: x / SG: x / pH: x  Gluc: x / Ketone: x  / Bili: x / Urobili: x   Blood: x / Protein: x / Nitrite: x   Leuk Esterase: x / RBC: 412 /hpf / WBC 6 /HPF   Sq Epi: x / Non Sq Epi: 1 /hpf / Bacteria: Negative    COVID related labs:  18 Apr 2020 01:13  D-dimer:  x  Calcitonin:  x  CRP:  x  LDH:  x  Lactate,Blood:  x  CK:  x  Troponin I:  x  Troponin T:  x  Troponin HS:  x  Ferritin, Serum: 1066<H>  BNP:  x    (VENOUS):  04-17-20 @ 22:57  pH 7.25 / pCO2 55 / pO2 23 / HCO3 23  Total CO2, Venous 25  FiO2, Venous --  Oxygen Saturation 25  Blood Gas Venous - Creatinine --  Blood Gas Venous - Glucose >900  Blood Gas Venous - Hematocrit --  Blood Gas Venous Hemoglobin --  Blood Gas Venous - Lactate 3.0  Blood Gas Venous - Potassium 7.0  Blood Gas Venous - Sodium 130  Base Excess, Venous -4.1

## 2020-04-19 DIAGNOSIS — G40.909 EPILEPSY, UNSPECIFIED, NOT INTRACTABLE, WITHOUT STATUS EPILEPTICUS: ICD-10-CM

## 2020-04-19 DIAGNOSIS — E11.00 TYPE 2 DIABETES MELLITUS WITH HYPEROSMOLARITY WITHOUT NONKETOTIC HYPERGLYCEMIC-HYPEROSMOLAR COMA (NKHHC): ICD-10-CM

## 2020-04-19 DIAGNOSIS — Z02.9 ENCOUNTER FOR ADMINISTRATIVE EXAMINATIONS, UNSPECIFIED: ICD-10-CM

## 2020-04-19 DIAGNOSIS — D86.9 SARCOIDOSIS, UNSPECIFIED: ICD-10-CM

## 2020-04-19 DIAGNOSIS — U07.1 COVID-19: ICD-10-CM

## 2020-04-19 DIAGNOSIS — B37.9 CANDIDIASIS, UNSPECIFIED: ICD-10-CM

## 2020-04-19 DIAGNOSIS — N18.3 CHRONIC KIDNEY DISEASE, STAGE 3 (MODERATE): ICD-10-CM

## 2020-04-19 LAB
ALBUMIN SERPL ELPH-MCNC: 3.1 G/DL — LOW (ref 3.3–5)
ALP SERPL-CCNC: 38 U/L — LOW (ref 40–120)
ALT FLD-CCNC: 5 U/L — LOW (ref 10–45)
ANION GAP SERPL CALC-SCNC: 13 MMOL/L — SIGNIFICANT CHANGE UP (ref 5–17)
AST SERPL-CCNC: 21 U/L — SIGNIFICANT CHANGE UP (ref 10–40)
BILIRUB SERPL-MCNC: 0.2 MG/DL — SIGNIFICANT CHANGE UP (ref 0.2–1.2)
BUN SERPL-MCNC: 31 MG/DL — HIGH (ref 7–23)
CALCIUM SERPL-MCNC: 9.4 MG/DL — SIGNIFICANT CHANGE UP (ref 8.4–10.5)
CHLORIDE SERPL-SCNC: 110 MMOL/L — HIGH (ref 96–108)
CO2 SERPL-SCNC: 22 MMOL/L — SIGNIFICANT CHANGE UP (ref 22–31)
CREAT SERPL-MCNC: 1.38 MG/DL — HIGH (ref 0.5–1.3)
GLUCOSE BLDC GLUCOMTR-MCNC: 112 MG/DL — HIGH (ref 70–99)
GLUCOSE BLDC GLUCOMTR-MCNC: 117 MG/DL — HIGH (ref 70–99)
GLUCOSE BLDC GLUCOMTR-MCNC: 54 MG/DL — LOW (ref 70–99)
GLUCOSE BLDC GLUCOMTR-MCNC: 90 MG/DL — SIGNIFICANT CHANGE UP (ref 70–99)
GLUCOSE SERPL-MCNC: 78 MG/DL — SIGNIFICANT CHANGE UP (ref 70–99)
HCT VFR BLD CALC: 33.5 % — LOW (ref 34.5–45)
HGB BLD-MCNC: 10.5 G/DL — LOW (ref 11.5–15.5)
MAGNESIUM SERPL-MCNC: 1.8 MG/DL — SIGNIFICANT CHANGE UP (ref 1.6–2.6)
MCHC RBC-ENTMCNC: 26.9 PG — LOW (ref 27–34)
MCHC RBC-ENTMCNC: 31.3 GM/DL — LOW (ref 32–36)
MCV RBC AUTO: 85.7 FL — SIGNIFICANT CHANGE UP (ref 80–100)
NRBC # BLD: 0 /100 WBCS — SIGNIFICANT CHANGE UP (ref 0–0)
PHENYTOIN FREE SERPL-MCNC: 5.2 UG/ML — LOW (ref 10–20)
PHOSPHATE SERPL-MCNC: 2.3 MG/DL — LOW (ref 2.5–4.5)
PLATELET # BLD AUTO: 272 K/UL — SIGNIFICANT CHANGE UP (ref 150–400)
POTASSIUM SERPL-MCNC: 4.4 MMOL/L — SIGNIFICANT CHANGE UP (ref 3.5–5.3)
POTASSIUM SERPL-SCNC: 4.4 MMOL/L — SIGNIFICANT CHANGE UP (ref 3.5–5.3)
PROT SERPL-MCNC: 7.1 G/DL — SIGNIFICANT CHANGE UP (ref 6–8.3)
RBC # BLD: 3.91 M/UL — SIGNIFICANT CHANGE UP (ref 3.8–5.2)
RBC # FLD: 12.5 % — SIGNIFICANT CHANGE UP (ref 10.3–14.5)
SODIUM SERPL-SCNC: 145 MMOL/L — SIGNIFICANT CHANGE UP (ref 135–145)
WBC # BLD: 6.23 K/UL — SIGNIFICANT CHANGE UP (ref 3.8–10.5)
WBC # FLD AUTO: 6.23 K/UL — SIGNIFICANT CHANGE UP (ref 3.8–10.5)

## 2020-04-19 PROCEDURE — 12345: CPT | Mod: NC

## 2020-04-19 PROCEDURE — 99231 SBSQ HOSP IP/OBS SF/LOW 25: CPT

## 2020-04-19 RX ORDER — FLUCONAZOLE 150 MG/1
200 TABLET ORAL DAILY
Refills: 0 | Status: DISCONTINUED | OUTPATIENT
Start: 2020-04-19 | End: 2020-04-27

## 2020-04-19 RX ORDER — LOSARTAN POTASSIUM 100 MG/1
25 TABLET, FILM COATED ORAL DAILY
Refills: 0 | Status: DISCONTINUED | OUTPATIENT
Start: 2020-04-19 | End: 2020-04-19

## 2020-04-19 RX ORDER — AMLODIPINE BESYLATE 2.5 MG/1
10 TABLET ORAL DAILY
Refills: 0 | Status: DISCONTINUED | OUTPATIENT
Start: 2020-04-19 | End: 2020-04-27

## 2020-04-19 RX ORDER — ALBUTEROL 90 UG/1
1 AEROSOL, METERED ORAL EVERY 4 HOURS
Refills: 0 | Status: DISCONTINUED | OUTPATIENT
Start: 2020-04-19 | End: 2020-04-27

## 2020-04-19 RX ORDER — ATORVASTATIN CALCIUM 80 MG/1
40 TABLET, FILM COATED ORAL AT BEDTIME
Refills: 0 | Status: DISCONTINUED | OUTPATIENT
Start: 2020-04-19 | End: 2020-04-27

## 2020-04-19 RX ORDER — ACETAMINOPHEN 500 MG
975 TABLET ORAL EVERY 8 HOURS
Refills: 0 | Status: DISCONTINUED | OUTPATIENT
Start: 2020-04-19 | End: 2020-04-19

## 2020-04-19 RX ORDER — INSULIN GLARGINE 100 [IU]/ML
24 INJECTION, SOLUTION SUBCUTANEOUS AT BEDTIME
Refills: 0 | Status: DISCONTINUED | OUTPATIENT
Start: 2020-04-19 | End: 2020-04-20

## 2020-04-19 RX ORDER — LOSARTAN POTASSIUM 100 MG/1
1 TABLET, FILM COATED ORAL
Qty: 0 | Refills: 0 | DISCHARGE

## 2020-04-19 RX ORDER — ENOXAPARIN SODIUM 100 MG/ML
40 INJECTION SUBCUTANEOUS DAILY
Refills: 0 | Status: DISCONTINUED | OUTPATIENT
Start: 2020-04-19 | End: 2020-04-27

## 2020-04-19 RX ORDER — ACETAMINOPHEN 500 MG
650 TABLET ORAL EVERY 6 HOURS
Refills: 0 | Status: DISCONTINUED | OUTPATIENT
Start: 2020-04-19 | End: 2020-04-27

## 2020-04-19 RX ORDER — ESCITALOPRAM OXALATE 10 MG/1
1 TABLET, FILM COATED ORAL
Qty: 0 | Refills: 0 | DISCHARGE

## 2020-04-19 RX ADMIN — Medication 8 UNIT(S): at 18:12

## 2020-04-19 RX ADMIN — FLUCONAZOLE 200 MILLIGRAM(S): 150 TABLET ORAL at 12:11

## 2020-04-19 RX ADMIN — GABAPENTIN 300 MILLIGRAM(S): 400 CAPSULE ORAL at 20:54

## 2020-04-19 RX ADMIN — Medication 400 MILLIGRAM(S): at 08:55

## 2020-04-19 RX ADMIN — ENOXAPARIN SODIUM 40 MILLIGRAM(S): 100 INJECTION SUBCUTANEOUS at 12:11

## 2020-04-19 RX ADMIN — AMLODIPINE BESYLATE 10 MILLIGRAM(S): 2.5 TABLET ORAL at 05:40

## 2020-04-19 RX ADMIN — Medication 300 MILLIGRAM(S): at 20:54

## 2020-04-19 RX ADMIN — Medication 8 UNIT(S): at 12:11

## 2020-04-19 RX ADMIN — Medication 15 MILLIGRAM(S): at 05:40

## 2020-04-19 RX ADMIN — LACOSAMIDE 150 MILLIGRAM(S): 50 TABLET ORAL at 18:11

## 2020-04-19 RX ADMIN — Medication 8 UNIT(S): at 08:55

## 2020-04-19 RX ADMIN — PANTOPRAZOLE SODIUM 40 MILLIGRAM(S): 20 TABLET, DELAYED RELEASE ORAL at 12:11

## 2020-04-19 RX ADMIN — Medication 650 MILLIGRAM(S): at 06:48

## 2020-04-19 RX ADMIN — Medication 975 MILLIGRAM(S): at 00:50

## 2020-04-19 RX ADMIN — ATORVASTATIN CALCIUM 40 MILLIGRAM(S): 80 TABLET, FILM COATED ORAL at 20:55

## 2020-04-19 RX ADMIN — LACOSAMIDE 150 MILLIGRAM(S): 50 TABLET ORAL at 05:40

## 2020-04-19 NOTE — PROGRESS NOTE ADULT - PROBLEM SELECTOR PLAN 4
Baseline SCr 1.5  -renally dose meds, avoid nephrotoxins, monitor SCr -continue home phenytoin and vimpat  -monitor phenytoin level q72h  -seizure precautions -continue home phenytoin and vimpat  -check phenytoin level in AM as it was low on admission, no need to monitor vimpat levels per pharmacy  -seizure precautions

## 2020-04-19 NOTE — PROGRESS NOTE ADULT - ASSESSMENT
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

## 2020-04-19 NOTE — PROGRESS NOTE ADULT - PROBLEM SELECTOR PROBLEM 2
Hyperosmolar non-ketotic state due to type 2 diabetes mellitus Type 2 diabetes mellitus with hyperosmolarity without coma, with long-term current use of insulin

## 2020-04-19 NOTE — PROGRESS NOTE ADULT - SUBJECTIVE AND OBJECTIVE BOX
Chief Complaint: Evaluating this 50 y/o F for uncontrolled Type 2 DM w/ hyperglycemia      Interval History: Glucose mostly at goal. Slightly below goal fasting.    MEDICATIONS  (STANDING):  amLODIPine   Tablet 10 milliGRAM(s) Oral daily  atorvastatin 40 milliGRAM(s) Oral at bedtime  dextrose 5%. 1000 milliLiter(s) (50 mL/Hr) IV Continuous <Continuous>  dextrose 50% Injectable 12.5 Gram(s) IV Push once  dextrose 50% Injectable 25 Gram(s) IV Push once  dextrose 50% Injectable 25 Gram(s) IV Push once  enoxaparin Injectable 40 milliGRAM(s) SubCutaneous daily  fluconAZOLE   Tablet 200 milliGRAM(s) Oral daily  gabapentin 300 milliGRAM(s) Oral at bedtime  hydroxychloroquine   Oral   hydroxychloroquine 400 milliGRAM(s) Oral every 24 hours  insulin glargine Injectable (LANTUS) 24 Unit(s) SubCutaneous at bedtime  insulin lispro (HumaLOG) corrective regimen sliding scale   SubCutaneous three times a day before meals  insulin lispro (HumaLOG) corrective regimen sliding scale   SubCutaneous at bedtime  insulin lispro Injectable (HumaLOG) 8 Unit(s) SubCutaneous three times a day before meals  lacosamide 150 milliGRAM(s) Oral two times a day  pantoprazole  Injectable 40 milliGRAM(s) IV Push daily  phenytoin   Capsule 300 milliGRAM(s) Oral at bedtime  predniSONE   Tablet 15 milliGRAM(s) Oral daily    MEDICATIONS  (PRN):  acetaminophen   Tablet .. 650 milliGRAM(s) Oral every 6 hours PRN Temp greater or equal to 38C (100.4F), Mild Pain (1 - 3), Moderate Pain (4 - 6)  ALBUTerol    90 MICROgram(s) HFA Inhaler 1 Puff(s) Inhalation every 4 hours PRN Shortness of Breath and/or Wheezing  dextrose 40% Gel 15 Gram(s) Oral once PRN Blood Glucose LESS THAN 70 milliGRAM(s)/deciliter  glucagon  Injectable 1 milliGRAM(s) IntraMuscular once PRN Glucose LESS THAN 70 milligrams/deciliter      Allergies    No Known Allergies    Intolerances      Review of Systems:  Not obtained at this time      PHYSICAL EXAM:  VITALS: T(C): 37.1 (04-19-20 @ 17:03)  T(F): 98.8 (04-19-20 @ 17:03), Max: 101.6 (04-19-20 @ 00:10)  HR: 98 (04-19-20 @ 17:03) (93 - 100)  BP: 121/77 (04-19-20 @ 17:03) (107/72 - 138/78)  RR:  (20 - 22)  SpO2:  (93% - 96%)  Wt(kg): --  Deferred. Refer to Internal Medicine PE      POCT Blood Glucose.: 112 mg/dL (04-19-20 @ 16:57)  POCT Blood Glucose.: 117 mg/dL (04-19-20 @ 12:04)  POCT Blood Glucose.: 165 mg/dL (04-18-20 @ 22:10)  POCT Blood Glucose.: 204 mg/dL (04-18-20 @ 16:59)  POCT Blood Glucose.: 179 mg/dL (04-18-20 @ 16:04)  POCT Blood Glucose.: 187 mg/dL (04-18-20 @ 15:21)  POCT Blood Glucose.: 182 mg/dL (04-18-20 @ 14:15)  POCT Blood Glucose.: 174 mg/dL (04-18-20 @ 13:07)  POCT Blood Glucose.: 194 mg/dL (04-18-20 @ 12:09)  POCT Blood Glucose.: 218 mg/dL (04-18-20 @ 11:11)  POCT Blood Glucose.: 270 mg/dL (04-18-20 @ 10:00)  POCT Blood Glucose.: 259 mg/dL (04-18-20 @ 09:15)  POCT Blood Glucose.: 238 mg/dL (04-18-20 @ 08:23)  POCT Blood Glucose.: 386 mg/dL (04-18-20 @ 06:23)  POCT Blood Glucose.: 545 mg/dL (04-18-20 @ 05:07)  POCT Blood Glucose.: 572 mg/dL (04-18-20 @ 04:17)  POCT Blood Glucose.: >600 mg/dL (04-17-20 @ 23:55)        04-19    145  |  110<H>  |  31<H>  ----------------------------<  78  4.4   |  22  |  1.38<H>    EGFR if : 53<L>  EGFR if non : 45<L>    Ca    9.4      04-19  Mg     1.8     04-19  Phos  2.3     04-19    TPro  7.1  /  Alb  3.1<L>  /  TBili  0.2  /  DBili  x   /  AST  21  /  ALT  5<L>  /  AlkPhos  38<L>  04-19        Thyroid Function Tests:

## 2020-04-19 NOTE — PROGRESS NOTE ADULT - PROBLEM SELECTOR PLAN 5
continue home prednisone 10mg daily Baseline SCr 1.5  -renally dose meds, avoid nephrotoxins, monitor SCr

## 2020-04-19 NOTE — PROGRESS NOTE ADULT - PROBLEM SELECTOR PLAN 1
-Plaquenil (4/18-)  -titrate supp O2 to maintain sat 92-96%, currently on 4L NC -Plaquenil (4/18-), QTc 423 (4/18)  -titrate supp O2 to maintain sat 92-96%, currently on 4L NC -Plaquenil (4/18-), QTc 423 (4/18)  -titrate supp O2 to maintain sat 92-96%, currently on 4L NC  -If worsens clinically will consider Tocilizumab vs Anakinra  -If becomes hypotensive, may require stress-dose steroids due to chronic prednisone -Plaquenil (4/18-), QTc 423 (4/18)  -titrate supp O2 to maintain sat 92-96%, currently on 4L NC  -If worsens clinically will consider Anakinra  -If becomes hypotensive, may require stress-dose steroids due to chronic prednisone

## 2020-04-19 NOTE — PROGRESS NOTE ADULT - PROBLEM SELECTOR PLAN 7
VTE ppx:    Transitions of Care Status:  1.  Name of PCP: Dr. Ash Marquez ([id number])  2.  PCP Contacted on Admission: [ ] Y    [ ] N    3.  PCP contacted at Discharge: [ ] Y    [ ] N    [ ] N/A  4.  Post-Discharge Appointment Date and Location:  5.  Summary of Handoff given to PCP: Home regimen amlodipine 10mg qD, losartan 25mg AM, losartan 50mg qHS  -amlodipine and losartan 25mg, restart home losartan 50mg qhs as needed  -Goal BP<130/80 Home regimen amlodipine 10mg qD, losartan 25mg AM, losartan 50mg qHS  -continue amlodipine, losartan held for MIKALA on admission  -Goal BP<130/80

## 2020-04-19 NOTE — PROGRESS NOTE ADULT - SUBJECTIVE AND OBJECTIVE BOX
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  Per outpatient notes pt on prednisone 10 daily.  Pt recently given fluconazole and nystatin for concern for oral candidiasis.  Pt previously noted to be taking roughly 35 Lantus and 15 Humalog TID however had episodes of hypoglycemia for which she was told to take Humalog 5 when she notes FS <120. BMP glucose 1039, K+ 7.6. Found to be COVID+.    MICU COURSE: Patient admitted to MICU and started on insulin drip. Hyperglycemia resolved and patient transitioned to basal bolus on diet. Hyperkalemia fully resolved. Patient was continued on home prednisone and anti-epileptic medications. Patient oxygen requirements remained at 4L NC for duration of her stay. Inflammatory markers CRP, ferritin, D-dimer downtrended. Patient is clinically stable and ready for transition to floors.    SUBJECTIVE / OVERNIGHT EVENTS:      ADDITIONAL REVIEW OF SYSTEMS:    MEDICATIONS  (STANDING):  amLODIPine   Tablet 10 milliGRAM(s) Oral daily  dextrose 5%. 1000 milliLiter(s) (50 mL/Hr) IV Continuous <Continuous>  dextrose 50% Injectable 12.5 Gram(s) IV Push once  dextrose 50% Injectable 25 Gram(s) IV Push once  dextrose 50% Injectable 25 Gram(s) IV Push once  gabapentin 300 milliGRAM(s) Oral at bedtime  heparin   Injectable 5000 Unit(s) SubCutaneous every 12 hours  hydroxychloroquine   Oral   hydroxychloroquine 400 milliGRAM(s) Oral every 24 hours  insulin glargine Injectable (LANTUS) 30 Unit(s) SubCutaneous at bedtime  insulin lispro (HumaLOG) corrective regimen sliding scale   SubCutaneous three times a day before meals  insulin lispro (HumaLOG) corrective regimen sliding scale   SubCutaneous at bedtime  insulin lispro Injectable (HumaLOG) 8 Unit(s) SubCutaneous three times a day before meals  lacosamide 150 milliGRAM(s) Oral two times a day  pantoprazole  Injectable 40 milliGRAM(s) IV Push daily  phenytoin   Capsule 300 milliGRAM(s) Oral daily  predniSONE   Tablet 10 milliGRAM(s) Oral daily    MEDICATIONS  (PRN):  acetaminophen   Tablet .. 975 milliGRAM(s) Oral every 8 hours PRN Temp greater or equal to 38C (100.4F)  dextrose 40% Gel 15 Gram(s) Oral once PRN Blood Glucose LESS THAN 70 milliGRAM(s)/deciliter  glucagon  Injectable 1 milliGRAM(s) IntraMuscular once PRN Glucose LESS THAN 70 milligrams/deciliter    CAPILLARY BLOOD GLUCOSE      POCT Blood Glucose.: 165 mg/dL (18 Apr 2020 22:10)  POCT Blood Glucose.: 204 mg/dL (18 Apr 2020 16:59)  POCT Blood Glucose.: 179 mg/dL (18 Apr 2020 16:04)  POCT Blood Glucose.: 187 mg/dL (18 Apr 2020 15:21)  POCT Blood Glucose.: 182 mg/dL (18 Apr 2020 14:15)  POCT Blood Glucose.: 174 mg/dL (18 Apr 2020 13:07)  POCT Blood Glucose.: 194 mg/dL (18 Apr 2020 12:09)  POCT Blood Glucose.: 218 mg/dL (18 Apr 2020 11:11)  POCT Blood Glucose.: 270 mg/dL (18 Apr 2020 10:00)  POCT Blood Glucose.: 259 mg/dL (18 Apr 2020 09:15)  POCT Blood Glucose.: 238 mg/dL (18 Apr 2020 08:23)  POCT Blood Glucose.: 386 mg/dL (18 Apr 2020 06:23)  POCT Blood Glucose.: 545 mg/dL (18 Apr 2020 05:07)  POCT Blood Glucose.: 572 mg/dL (18 Apr 2020 04:17)    I&O's Summary    17 Apr 2020 07:01  -  18 Apr 2020 07:00  --------------------------------------------------------  IN: 240 mL / OUT: 0 mL / NET: 240 mL    18 Apr 2020 07:01  -  19 Apr 2020 00:49  --------------------------------------------------------  IN: 1043 mL / OUT: 400 mL / NET: 643 mL        PHYSICAL EXAM per MICU provider note:  MS: awake, alert, oriented to self, date, location, can name, repeat, intact comprehension  CN: PERRL, no gross facial asymmetry  Motor: grossly intact strength throughout  General: woman in bed in no acute distress  CV: S1, S2  Resp: CTAB  GI: soft, nontender, normoactive bs      LABS:                        10.9   6.11  )-----------( 233      ( 18 Apr 2020 15:32 )             33.3     04-18    140  |  107  |  41<H>  ----------------------------<  194<H>  5.3   |  20<L>  |  1.36<H>    Ca    9.4      18 Apr 2020 15:32  Phos  3.7     04-18  Mg     2.6     04-18    TPro  6.8  /  Alb  3.0<L>  /  TBili  0.2  /  DBili  x   /  AST  19  /  ALT  6<L>  /  AlkPhos  38<L>  04-18    PT/INR - ( 18 Apr 2020 06:26 )   PT: 12.5 sec;   INR: 1.09 ratio         PTT - ( 18 Apr 2020 06:26 )  PTT:29.4 sec  CARDIAC MARKERS ( 17 Apr 2020 22:57 )  x     / x     / 12 U/L / x     / x          Urinalysis Basic - ( 17 Apr 2020 23:30 )    Color: x / Appearance: x / SG: x / pH: x  Gluc: x / Ketone: x  / Bili: x / Urobili: x   Blood: x / Protein: x / Nitrite: x   Leuk Esterase: x / RBC: 412 /hpf / WBC 6 /HPF   Sq Epi: x / Non Sq Epi: 1 /hpf / Bacteria: Negative        COVID-19 PCR: Detected (17 Apr 2020 23:09)      RADIOLOGY & ADDITIONAL TESTS:  Imaging from Last 24 Hours:    Electrocardiogram/QTc Interval: MICU accept note    Patient is a 49y old  Female who presents with a chief complaint of COVID (18 Apr 2020 15:51)    HPI: 48 yo F hx of 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  Per outpatient notes pt on prednisone 10 daily.  Pt recently given fluconazole and nystatin for concern for oral candidiasis.  Pt previously noted to be taking roughly 35 Lantus and 15 Humalog TID however had episodes of hypoglycemia for which she was told to take Humalog 5 when she notes FS <120. BMP glucose 1039, K+ 7.6. Found to be COVID+.    MICU COURSE: Patient admitted to MICU and started on insulin drip. Hyperglycemia resolved and patient transitioned to basal bolus on diet. Hyperkalemia fully resolved. Patient was continued on home prednisone and anti-epileptic medications. Patient oxygen requirements remained at 4L NC for duration of her stay. Inflammatory markers CRP, ferritin, D-dimer downtrended. Patient is clinically stable and ready for transition to floors.    SUBJECTIVE / OVERNIGHT EVENTS:  Pt reports feeling very weak and SOB. Shortness of breath has been stable for days. Pt did not eat any dinner 4/18. Denies N/V, abd pain. At home her children help her to the bathroom and shower.    ADDITIONAL REVIEW OF SYSTEMS:    MEDICATIONS  (STANDING):  amLODIPine   Tablet 10 milliGRAM(s) Oral daily  dextrose 5%. 1000 milliLiter(s) (50 mL/Hr) IV Continuous <Continuous>  dextrose 50% Injectable 12.5 Gram(s) IV Push once  dextrose 50% Injectable 25 Gram(s) IV Push once  dextrose 50% Injectable 25 Gram(s) IV Push once  gabapentin 300 milliGRAM(s) Oral at bedtime  heparin   Injectable 5000 Unit(s) SubCutaneous every 12 hours  hydroxychloroquine   Oral   hydroxychloroquine 400 milliGRAM(s) Oral every 24 hours  insulin glargine Injectable (LANTUS) 30 Unit(s) SubCutaneous at bedtime  insulin lispro (HumaLOG) corrective regimen sliding scale   SubCutaneous three times a day before meals  insulin lispro (HumaLOG) corrective regimen sliding scale   SubCutaneous at bedtime  insulin lispro Injectable (HumaLOG) 8 Unit(s) SubCutaneous three times a day before meals  lacosamide 150 milliGRAM(s) Oral two times a day  pantoprazole  Injectable 40 milliGRAM(s) IV Push daily  phenytoin   Capsule 300 milliGRAM(s) Oral daily  predniSONE   Tablet 10 milliGRAM(s) Oral daily    MEDICATIONS  (PRN):  acetaminophen   Tablet .. 975 milliGRAM(s) Oral every 8 hours PRN Temp greater or equal to 38C (100.4F)  dextrose 40% Gel 15 Gram(s) Oral once PRN Blood Glucose LESS THAN 70 milliGRAM(s)/deciliter  glucagon  Injectable 1 milliGRAM(s) IntraMuscular once PRN Glucose LESS THAN 70 milligrams/deciliter    CAPILLARY BLOOD GLUCOSE      POCT Blood Glucose.: 165 mg/dL (18 Apr 2020 22:10)  POCT Blood Glucose.: 204 mg/dL (18 Apr 2020 16:59)  POCT Blood Glucose.: 179 mg/dL (18 Apr 2020 16:04)  POCT Blood Glucose.: 187 mg/dL (18 Apr 2020 15:21)  POCT Blood Glucose.: 182 mg/dL (18 Apr 2020 14:15)  POCT Blood Glucose.: 174 mg/dL (18 Apr 2020 13:07)  POCT Blood Glucose.: 194 mg/dL (18 Apr 2020 12:09)  POCT Blood Glucose.: 218 mg/dL (18 Apr 2020 11:11)  POCT Blood Glucose.: 270 mg/dL (18 Apr 2020 10:00)  POCT Blood Glucose.: 259 mg/dL (18 Apr 2020 09:15)  POCT Blood Glucose.: 238 mg/dL (18 Apr 2020 08:23)  POCT Blood Glucose.: 386 mg/dL (18 Apr 2020 06:23)  POCT Blood Glucose.: 545 mg/dL (18 Apr 2020 05:07)  POCT Blood Glucose.: 572 mg/dL (18 Apr 2020 04:17)    I&O's Summary    17 Apr 2020 07:01  -  18 Apr 2020 07:00  --------------------------------------------------------  IN: 240 mL / OUT: 0 mL / NET: 240 mL    18 Apr 2020 07:01  -  19 Apr 2020 00:49  --------------------------------------------------------  IN: 1043 mL / OUT: 400 mL / NET: 643 mL        PHYSICAL EXAM:  Vital Signs Last 24 Hrs  T(C): 38.7 (19 Apr 2020 00:10), Max: 38.7 (19 Apr 2020 00:10)  T(F): 101.6 (19 Apr 2020 00:10), Max: 101.6 (19 Apr 2020 00:10)  HR: 100 (19 Apr 2020 02:22) (93 - 111)  BP: 138/78 (19 Apr 2020 00:10) (109/71 - 138/78)  BP(mean): 96 (18 Apr 2020 23:00) (85 - 105)  RR: 20 (19 Apr 2020 00:10) (20 - 24)  SpO2: 94% (19 Apr 2020 02:22) (93% - 99%)    GENERAL: No acute distress, well-developed  HEAD:  Atraumatic, Normocephalic  ENT: sclera clear  CHEST/LUNG: Clear to auscultation bilaterally; No wheeze, equal breath sounds bilaterally, unable to speak a full sentence, tachypneic, on 4L NC  HEART: Regular rate and rhythm; No murmurs, rubs, or gallops  ABDOMEN: Soft, nontender, nondistended; Bowel sounds present, no organomegaly, no guarding  EXTREMITIES:  No clubbing, cyanosis, or edema  PSYCH: Nl behavior, nl affect  NEUROLOGY: AAOx3, non-focal, moves all extremities spontaneously  SKIN: Normal color, No rashes or lesions        LABS:                        10.9   6.11  )-----------( 233      ( 18 Apr 2020 15:32 )             33.3     04-18    140  |  107  |  41<H>  ----------------------------<  194<H>  5.3   |  20<L>  |  1.36<H>    Ca    9.4      18 Apr 2020 15:32  Phos  3.7     04-18  Mg     2.6     04-18    TPro  6.8  /  Alb  3.0<L>  /  TBili  0.2  /  DBili  x   /  AST  19  /  ALT  6<L>  /  AlkPhos  38<L>  04-18    PT/INR - ( 18 Apr 2020 06:26 )   PT: 12.5 sec;   INR: 1.09 ratio         PTT - ( 18 Apr 2020 06:26 )  PTT:29.4 sec  CARDIAC MARKERS ( 17 Apr 2020 22:57 )  x     / x     / 12 U/L / x     / x          Urinalysis Basic - ( 17 Apr 2020 23:30 )    Color: x / Appearance: x / SG: x / pH: x  Gluc: x / Ketone: x  / Bili: x / Urobili: x   Blood: x / Protein: x / Nitrite: x   Leuk Esterase: x / RBC: 412 /hpf / WBC 6 /HPF   Sq Epi: x / Non Sq Epi: 1 /hpf / Bacteria: Negative        COVID-19 PCR: Detected (17 Apr 2020 23:09)      RADIOLOGY & ADDITIONAL TESTS:  Imaging from Last 24 Hours:

## 2020-04-19 NOTE — PROGRESS NOTE ADULT - PROBLEM SELECTOR PLAN 9
VTE ppx: HSQ    Transitions of Care Status:  1.  Name of PCP: Dr. Ash Marquez ([facility])  2.  PCP Contacted on Admission: [ ] Y    [ ] N    3.  PCP contacted at Discharge: [ ] Y    [ ] N    [ ] N/A  4.  Post-Discharge Appointment Date and Location:  5.  Summary of Handoff given to PCP: VTE ppx: lovenox    Transitions of Care Status:  1.  Name of PCP: Dr. Ash Marquez ([facility])  2.  PCP Contacted on Admission: [ ] Y    [ ] N    3.  PCP contacted at Discharge: [ ] Y    [ ] N    [ ] N/A  4.  Post-Discharge Appointment Date and Location:  5.  Summary of Handoff given to PCP:

## 2020-04-19 NOTE — PROGRESS NOTE ADULT - PROBLEM SELECTOR PLAN 1
Goal glucose 100-180  Decrease Lantus to 24 units  Continue with Humalog 8 units with meals.  c/w correction scale  Will adjust further as needed

## 2020-04-19 NOTE — PROGRESS NOTE ADULT - PROBLEM SELECTOR PLAN 3
HHS resolved s/p insulin gtt in ICU  -Endo following HHS resolved s/p insulin gtt in ICU  -Endo following  -monitor FS

## 2020-04-20 DIAGNOSIS — E78.5 HYPERLIPIDEMIA, UNSPECIFIED: ICD-10-CM

## 2020-04-20 LAB
GLUCOSE BLDC GLUCOMTR-MCNC: 107 MG/DL — HIGH (ref 70–99)
GLUCOSE BLDC GLUCOMTR-MCNC: 125 MG/DL — HIGH (ref 70–99)
GLUCOSE BLDC GLUCOMTR-MCNC: 179 MG/DL — HIGH (ref 70–99)
GLUCOSE BLDC GLUCOMTR-MCNC: 196 MG/DL — HIGH (ref 70–99)
GLUCOSE BLDC GLUCOMTR-MCNC: 249 MG/DL — HIGH (ref 70–99)
GLUCOSE BLDC GLUCOMTR-MCNC: 348 MG/DL — HIGH (ref 70–99)
GLUCOSE BLDC GLUCOMTR-MCNC: 97 MG/DL — SIGNIFICANT CHANGE UP (ref 70–99)
GLUCOSE BLDC GLUCOMTR-MCNC: >600 MG/DL — CRITICAL HIGH (ref 70–99)
PHENYTOIN FREE SERPL-MCNC: < 0.5 MG/L — LOW (ref 1–2)

## 2020-04-20 PROCEDURE — 99232 SBSQ HOSP IP/OBS MODERATE 35: CPT

## 2020-04-20 PROCEDURE — 99233 SBSQ HOSP IP/OBS HIGH 50: CPT

## 2020-04-20 RX ORDER — ONDANSETRON 8 MG/1
4 TABLET, FILM COATED ORAL EVERY 6 HOURS
Refills: 0 | Status: DISCONTINUED | OUTPATIENT
Start: 2020-04-20 | End: 2020-04-27

## 2020-04-20 RX ORDER — INSULIN GLARGINE 100 [IU]/ML
10 INJECTION, SOLUTION SUBCUTANEOUS AT BEDTIME
Refills: 0 | Status: DISCONTINUED | OUTPATIENT
Start: 2020-04-20 | End: 2020-04-22

## 2020-04-20 RX ORDER — INSULIN LISPRO 100/ML
3 VIAL (ML) SUBCUTANEOUS
Refills: 0 | Status: DISCONTINUED | OUTPATIENT
Start: 2020-04-20 | End: 2020-04-22

## 2020-04-20 RX ORDER — MULTIVIT-MIN/FERROUS GLUCONATE 9 MG/15 ML
1 LIQUID (ML) ORAL DAILY
Refills: 0 | Status: DISCONTINUED | OUTPATIENT
Start: 2020-04-20 | End: 2020-04-27

## 2020-04-20 RX ADMIN — LACOSAMIDE 150 MILLIGRAM(S): 50 TABLET ORAL at 17:47

## 2020-04-20 RX ADMIN — Medication 1: at 13:08

## 2020-04-20 RX ADMIN — FLUCONAZOLE 200 MILLIGRAM(S): 150 TABLET ORAL at 13:09

## 2020-04-20 RX ADMIN — Medication 650 MILLIGRAM(S): at 16:10

## 2020-04-20 RX ADMIN — Medication 1 TABLET(S): at 17:48

## 2020-04-20 RX ADMIN — GABAPENTIN 300 MILLIGRAM(S): 400 CAPSULE ORAL at 21:07

## 2020-04-20 RX ADMIN — ENOXAPARIN SODIUM 40 MILLIGRAM(S): 100 INJECTION SUBCUTANEOUS at 13:09

## 2020-04-20 RX ADMIN — INSULIN GLARGINE 10 UNIT(S): 100 INJECTION, SOLUTION SUBCUTANEOUS at 21:05

## 2020-04-20 RX ADMIN — Medication 2: at 17:48

## 2020-04-20 RX ADMIN — Medication 300 MILLIGRAM(S): at 21:07

## 2020-04-20 RX ADMIN — LACOSAMIDE 150 MILLIGRAM(S): 50 TABLET ORAL at 05:49

## 2020-04-20 RX ADMIN — AMLODIPINE BESYLATE 10 MILLIGRAM(S): 2.5 TABLET ORAL at 05:49

## 2020-04-20 RX ADMIN — Medication 15 MILLIGRAM(S): at 05:49

## 2020-04-20 RX ADMIN — Medication 3 UNIT(S): at 17:47

## 2020-04-20 RX ADMIN — Medication 3 UNIT(S): at 13:09

## 2020-04-20 RX ADMIN — Medication 400 MILLIGRAM(S): at 09:24

## 2020-04-20 RX ADMIN — Medication 650 MILLIGRAM(S): at 05:51

## 2020-04-20 RX ADMIN — PANTOPRAZOLE SODIUM 40 MILLIGRAM(S): 20 TABLET, DELAYED RELEASE ORAL at 13:09

## 2020-04-20 RX ADMIN — ONDANSETRON 4 MILLIGRAM(S): 8 TABLET, FILM COATED ORAL at 16:49

## 2020-04-20 RX ADMIN — ATORVASTATIN CALCIUM 40 MILLIGRAM(S): 80 TABLET, FILM COATED ORAL at 21:07

## 2020-04-20 RX ADMIN — Medication 1: at 09:24

## 2020-04-20 RX ADMIN — Medication 8 UNIT(S): at 09:25

## 2020-04-20 NOTE — PROGRESS NOTE ADULT - ASSESSMENT
50yo woman h/o severe pulmonary and neurosarcoidosis with seizures on chronic prednisone, DM2 nonadherent to medications (A1c 14% in 4/2020), CKD III (baseline SCr 1.5), HTN, HLD; presented with HHS 2/2 COVID19 infection.

## 2020-04-20 NOTE — PROGRESS NOTE ADULT - PROBLEM SELECTOR PLAN 9
VTE ppx: lovenox    Transitions of Care Status:  1.  Name of PCP: Dr. Ash Marquez ([facility])  2.  PCP Contacted on Admission: [ ] Y    [ ] N    3.  PCP contacted at Discharge: [ ] Y    [ ] N    [ ] N/A  4.  Post-Discharge Appointment Date and Location:  5.  Summary of Handoff given to PCP:

## 2020-04-20 NOTE — PROGRESS NOTE ADULT - PROBLEM SELECTOR PLAN 7
Home regimen amlodipine 10mg qD, losartan 25mg AM, losartan 50mg qHS  -continue amlodipine, losartan held for MIKALA on admission  -Goal BP<130/80

## 2020-04-20 NOTE — PROGRESS NOTE ADULT - PROBLEM SELECTOR PLAN 1
-Plaquenil (4/18-), QTc 423 (4/18)  -titrate supp O2 to maintain sat 92-96%, currently on 4L NC; wean as tolerated.   -If worsens clinically will consider Anakinra or tociluzimab or clinical trial.   -If becomes hypotensive, may require stress-dose steroids due to chronic prednisone

## 2020-04-20 NOTE — PROVIDER CONTACT NOTE (OTHER) - ASSESSMENT
Pt AOx4, VSS, and resting in bed. Upon RN assessment, pt was shaking in bed but denies headache, lightheadedness, & hunger. RN checked FS which was 52. Repeat FS showed 90. Pt reports not eating dinner d/t decreased appetite. RN gave chocolate pudding, orange juice x2, crackers & string cheese of which pt ate.

## 2020-04-20 NOTE — PROGRESS NOTE ADULT - PROBLEM SELECTOR PLAN 1
- Patient with severe hypoglycemia while febrile and on steroids  - Recommend check C-peptide, KAIT Ab, Islet Cell Ab, and Zn Transporter Ab in AM to r/o JAMES given mildly high BHB and trace ketones on admission  - Recommend Lantus 10 units QHS. Recommend Humalog 3 units TID AC and continue humalog low SSI AC and HS  - Hypoglycemia can trigger seizure in a patient with seizure history so goal is to avoid hypoglycemia. Also febrile state/sepsis can trigger hypoglycemia, so continue aggressive management for underlying condition    Outpatient Plan  - Basal-Bolus insulin for home or rehab  - If going home, patient will need insulin teaching as initial or refresher teaching given A1c is 14%. She will also need glucometer, test strips, and lancets  - Final doses to be determined

## 2020-04-20 NOTE — PROGRESS NOTE ADULT - ASSESSMENT
48 y/o female with uncontrolled T2DM (A1c 14%) with history of pulmonary and neurosarcoidosis and h/o seizure on steroids at home here with COVID-19 infection. Patient continues to be febrile and noted to have hypoglycemia while on Prednisone 15 mg QD. Also with h/o HTN and HLD.

## 2020-04-20 NOTE — PROGRESS NOTE ADULT - SUBJECTIVE AND OBJECTIVE BOX
Chief Complaint/Follow-up on: T2DM    Subjective: Lantus held overnight for FS 90 at bedtime. Patient continues to be on home dose Prednisone 15 mg QAM for sarcoidosis. Called patient room and no answer. Spoke with Diana WOLF. Patient has been eating and ate breakfast this morning. No seizures this admission.    History: 50 yo woman h/o severe pulmonary and neurosarcoidosis with seizures on chronic prednisone, DM2 nonadherent to medications (A1c 14% in 4/2020), CKD III (baseline SCr 1.5), HTN, HLD presented with HHS 2/2 COVID19 infection. Patient was on an insulin gtt in the ICU this admission. Serum osm was 334 with FS in 500s. BHB was 1.2. Had presence of trace urine ketones on admission.      Diet Ordered: Cons carb with HS snack    MEDICATIONS  (STANDING):  amLODIPine   Tablet 10 milliGRAM(s) Oral daily  atorvastatin 40 milliGRAM(s) Oral at bedtime  dextrose 5%. 1000 milliLiter(s) (50 mL/Hr) IV Continuous <Continuous>  dextrose 50% Injectable 12.5 Gram(s) IV Push once  dextrose 50% Injectable 25 Gram(s) IV Push once  dextrose 50% Injectable 25 Gram(s) IV Push once  enoxaparin Injectable 40 milliGRAM(s) SubCutaneous daily  fluconAZOLE   Tablet 200 milliGRAM(s) Oral daily  gabapentin 300 milliGRAM(s) Oral at bedtime  hydroxychloroquine   Oral   hydroxychloroquine 400 milliGRAM(s) Oral every 24 hours  insulin glargine Injectable (LANTUS) 24 Unit(s) SubCutaneous at bedtime  insulin lispro (HumaLOG) corrective regimen sliding scale LOW  SubCutaneous three times a day before meals  insulin lispro (HumaLOG) corrective regimen sliding scale LOW  SubCutaneous at bedtime  insulin lispro Injectable (HumaLOG) 8 Unit(s) SubCutaneous three times a day before meals  lacosamide 150 milliGRAM(s) Oral two times a day  pantoprazole  Injectable 40 milliGRAM(s) IV Push daily  phenytoin   Capsule 300 milliGRAM(s) Oral at bedtime  predniSONE   Tablet 15 milliGRAM(s) Oral daily    MEDICATIONS  (PRN):  acetaminophen   Tablet .. 650 milliGRAM(s) Oral every 6 hours PRN Temp greater or equal to 38C (100.4F), Mild Pain (1 - 3), Moderate Pain (4 - 6)  ALBUTerol    90 MICROgram(s) HFA Inhaler 1 Puff(s) Inhalation every 4 hours PRN Shortness of Breath and/or Wheezing  dextrose 40% Gel 15 Gram(s) Oral once PRN Blood Glucose LESS THAN 70 milliGRAM(s)/deciliter  glucagon  Injectable 1 milliGRAM(s) IntraMuscular once PRN Glucose LESS THAN 70 milligrams/deciliter      PHYSICAL EXAM:  VITALS: T(C): 37.6 (04-20-20 @ 09:15)  T(F): 99.7 (04-20-20 @ 09:15), Max: 101 (04-20-20 @ 05:07)  HR: 100 (04-20-20 @ 09:15) (93 - 106)  BP: 106/63 (04-20-20 @ 09:15) (106/63 - 137/84)  RR:  (20 - 24)  SpO2:  (89% - 94%) 4L NC  Wt(kg): 56.9 kg    Due to [facility] policy during the evolving novel coronavirus outbreak, efforts are being made to limit unnecessary patient contacts to limit the spread of disease. Accordingly, patients without clear indication for physical exam or face to face interview from the Endocrine Team will be adjusted in conjunction with conversations with primary provider team. This is being done for the safety of all patients we care for.    As per  Progress Note 4/19: Physical Exam:  GENERAL: No acute distress, well-developed  HEAD:  Atraumatic, Normocephalic  ENT: sclera clear  CHEST/LUNG: Clear to auscultation bilaterally; No wheeze, equal breath sounds bilaterally, unable to speak a full sentence, tachypneic, on 4L NC  HEART: Regular rate and rhythm; No murmurs, rubs, or gallops  ABDOMEN: Soft, nontender, nondistended; Bowel sounds present, no organomegaly, no guarding  EXTREMITIES:  No clubbing, cyanosis, or edema  PSYCH: Nl behavior, nl affect  NEUROLOGY: AAOx3, non-focal, moves all extremities spontaneously  SKIN: Normal color, No rashes or lesions      POCT Blood Glucose.: 196 mg/dL (04-20-20 @ 09:14)  POCT Blood Glucose.: 107 mg/dL (04-20-20 @ 05:46) Prednisone 15 mg QD, Hum 8+1    POCT Blood Glucose.: 90 mg/dL (04-19-20 @ 20:46) x Lantus 24  POCT Blood Glucose.: 54 mg/dL (04-19-20 @ 20:45)  POCT Blood Glucose.: 112 mg/dL (04-19-20 @ 16:57) Hum 8  POCT Blood Glucose.: 117 mg/dL (04-19-20 @ 12:04) Prednisone 15 mg QD, Hum 8  7am - Hum 8    POCT Blood Glucose.: 165 mg/dL (04-18-20 @ 22:10) Lantus 30  POCT Blood Glucose.: 204 mg/dL (04-18-20 @ 16:59) Hum 8+2  POCT Blood Glucose.: 179 mg/dL (04-18-20 @ 16:04)   POCT Blood Glucose.: 187 mg/dL (04-18-20 @ 15:21)  POCT Blood Glucose.: 182 mg/dL (04-18-20 @ 14:15)  POCT Blood Glucose.: 174 mg/dL (04-18-20 @ 13:07) NPH 15 x 1 dose  POCT Blood Glucose.: 194 mg/dL (04-18-20 @ 12:09)  POCT Blood Glucose.: 218 mg/dL (04-18-20 @ 11:11)  POCT Blood Glucose.: 270 mg/dL (04-18-20 @ 10:00)  POCT Blood Glucose.: 259 mg/dL (04-18-20 @ 09:15)  POCT Blood Glucose.: 238 mg/dL (04-18-20 @ 08:23)  POCT Blood Glucose.: 386 mg/dL (04-18-20 @ 06:23)  POCT Blood Glucose.: 545 mg/dL (04-18-20 @ 05:07) Prednisone 10 mg QD  POCT Blood Glucose.: 572 mg/dL (04-18-20 @ 04:17)  POCT Blood Glucose.: >600 mg/dL (04-17-20 @ 23:55) Lantus 10      Beta Hydroxy-Butyrate (04.17.20 @ 22:57)    Beta Hydroxy-Butyrate: 1.2 mmol/L    Blood Gas Venous - Lactate (04.17.20 @ 22:57)    Blood Gas Venous - Lactate: 3.0: Elevated lactate. Consider ordering follow-up lactate to trend. mmoL/L    04-19    145  |  110<H>  |  31<H>  ----------------------------<  78  4.4   |  22  |  1.38<H>    EGFR if : 53<L>  EGFR if non : 45<L>    Ca    9.4      04-19  Mg     1.8     04-19  Phos  2.3     04-19    TPro  7.1  /  Alb  3.1<L>  /  TBili  0.2  /  DBili  x   /  AST  21  /  ALT  5<L>  /  AlkPhos  38<L>  04-19    A1C with Estimated Average Glucose (04.18.20 @ 17:09)    A1C with Estimated Average Glucose Result: 14.2%

## 2020-04-20 NOTE — PROGRESS NOTE ADULT - SUBJECTIVE AND OBJECTIVE BOX
Patient is a 49y old  Female who presents with a chief complaint of COVID (19 Apr 2020 17:49)        SUBJECTIVE / OVERNIGHT EVENTS: Patient reports some cough and SOB. Not much appetite.       MEDICATIONS  (STANDING):  amLODIPine   Tablet 10 milliGRAM(s) Oral daily  atorvastatin 40 milliGRAM(s) Oral at bedtime  dextrose 5%. 1000 milliLiter(s) (50 mL/Hr) IV Continuous <Continuous>  dextrose 50% Injectable 12.5 Gram(s) IV Push once  dextrose 50% Injectable 25 Gram(s) IV Push once  dextrose 50% Injectable 25 Gram(s) IV Push once  enoxaparin Injectable 40 milliGRAM(s) SubCutaneous daily  fluconAZOLE   Tablet 200 milliGRAM(s) Oral daily  gabapentin 300 milliGRAM(s) Oral at bedtime  hydroxychloroquine   Oral   hydroxychloroquine 400 milliGRAM(s) Oral every 24 hours  insulin glargine Injectable (LANTUS) 10 Unit(s) SubCutaneous at bedtime  insulin lispro (HumaLOG) corrective regimen sliding scale   SubCutaneous three times a day before meals  insulin lispro (HumaLOG) corrective regimen sliding scale   SubCutaneous at bedtime  insulin lispro Injectable (HumaLOG) 3 Unit(s) SubCutaneous three times a day before meals  lacosamide 150 milliGRAM(s) Oral two times a day  multivitamin/minerals 1 Tablet(s) Oral daily  pantoprazole  Injectable 40 milliGRAM(s) IV Push daily  phenytoin   Capsule 300 milliGRAM(s) Oral at bedtime  predniSONE   Tablet 15 milliGRAM(s) Oral daily    MEDICATIONS  (PRN):  acetaminophen   Tablet .. 650 milliGRAM(s) Oral every 6 hours PRN Temp greater or equal to 38C (100.4F), Mild Pain (1 - 3), Moderate Pain (4 - 6)  ALBUTerol    90 MICROgram(s) HFA Inhaler 1 Puff(s) Inhalation every 4 hours PRN Shortness of Breath and/or Wheezing  dextrose 40% Gel 15 Gram(s) Oral once PRN Blood Glucose LESS THAN 70 milliGRAM(s)/deciliter  glucagon  Injectable 1 milliGRAM(s) IntraMuscular once PRN Glucose LESS THAN 70 milligrams/deciliter  ondansetron Injectable 4 milliGRAM(s) IV Push every 6 hours PRN Nausea and/or Vomiting      Vital Signs Last 24 Hrs  T(C): 36.9 (20 Apr 2020 17:03), Max: 37.6 (20 Apr 2020 09:15)  T(F): 98.5 (20 Apr 2020 17:03), Max: 99.7 (20 Apr 2020 09:15)  HR: 89 (20 Apr 2020 17:03) (89 - 100)  BP: 107/70 (20 Apr 2020 17:03) (106/63 - 107/70)  BP(mean): --  RR: 22 (20 Apr 2020 17:03) (22 - 24)  SpO2: 93% (20 Apr 2020 17:03) (89% - 93%)  CAPILLARY BLOOD GLUCOSE      POCT Blood Glucose.: 97 mg/dL (20 Apr 2020 20:58)  POCT Blood Glucose.: 249 mg/dL (20 Apr 2020 16:59)  POCT Blood Glucose.: 179 mg/dL (20 Apr 2020 13:04)  POCT Blood Glucose.: 196 mg/dL (20 Apr 2020 09:14)    I&O's Summary    19 Apr 2020 07:01  -  20 Apr 2020 07:00  --------------------------------------------------------  IN: 1200 mL / OUT: 0 mL / NET: 1200 mL    20 Apr 2020 07:01  -  21 Apr 2020 06:47  --------------------------------------------------------  IN: 120 mL / OUT: 0 mL / NET: 120 mL          PHYSICAL EXAM:   GENERAL: NAD, well-developed  HEAD:  Atraumatic, Normocephalic  EYES: Conjunctiva and sclera clear  NECK: Supple   CHEST/LUNG: Crackles.   HEART: S1S2 normal. Regular rate and rhythm; No murmurs, rubs, or gallops  ABDOMEN: Soft, Nontender, Nondistended; Bowel sounds present  EXTREMITIES: No clubbing, cyanosis, or edema  PSYCH/Neuro: AAOx3. Non-focal.   SKIN: No rashes or lesions      LABS:                        10.5   6.23  )-----------( 272      ( 19 Apr 2020 07:30 )             33.5     04-19    145  |  110<H>  |  31<H>  ----------------------------<  78  4.4   |  22  |  1.38<H>    Ca    9.4      19 Apr 2020 07:30  Phos  2.3     04-19  Mg     1.8     04-19    TPro  7.1  /  Alb  3.1<L>  /  TBili  0.2  /  DBili  x   /  AST  21  /  ALT  5<L>  /  AlkPhos  38<L>  04-19          RADIOLOGY & ADDITIONAL TESTS:    Imaging Personally Reviewed:  Consultant(s) Notes Reviewed:  Endo  Care Discussed with Consultants/Other Providers:

## 2020-04-20 NOTE — PROGRESS NOTE ADULT - PROBLEM SELECTOR PLAN 4
-continue home phenytoin and vimpat  -monitor phenytoin level as it was low on admission, no need to monitor vimpat levels per pharmacy  -seizure precautions

## 2020-04-21 LAB
C PEPTIDE SERPL-MCNC: 1 NG/ML — LOW (ref 1.1–4.4)
GLUCOSE BLDC GLUCOMTR-MCNC: 126 MG/DL — HIGH (ref 70–99)
GLUCOSE BLDC GLUCOMTR-MCNC: 132 MG/DL — HIGH (ref 70–99)
GLUCOSE BLDC GLUCOMTR-MCNC: 218 MG/DL — HIGH (ref 70–99)
GLUCOSE BLDC GLUCOMTR-MCNC: 71 MG/DL — SIGNIFICANT CHANGE UP (ref 70–99)

## 2020-04-21 PROCEDURE — 99232 SBSQ HOSP IP/OBS MODERATE 35: CPT

## 2020-04-21 PROCEDURE — 99233 SBSQ HOSP IP/OBS HIGH 50: CPT

## 2020-04-21 RX ORDER — PANTOPRAZOLE SODIUM 20 MG/1
40 TABLET, DELAYED RELEASE ORAL
Refills: 0 | Status: DISCONTINUED | OUTPATIENT
Start: 2020-04-21 | End: 2020-04-27

## 2020-04-21 RX ORDER — NYSTATIN 500MM UNIT
500000 POWDER (EA) MISCELLANEOUS
Refills: 0 | Status: DISCONTINUED | OUTPATIENT
Start: 2020-04-21 | End: 2020-04-27

## 2020-04-21 RX ADMIN — ENOXAPARIN SODIUM 40 MILLIGRAM(S): 100 INJECTION SUBCUTANEOUS at 12:25

## 2020-04-21 RX ADMIN — Medication 300 MILLIGRAM(S): at 20:31

## 2020-04-21 RX ADMIN — Medication 650 MILLIGRAM(S): at 08:56

## 2020-04-21 RX ADMIN — Medication 1 TABLET(S): at 12:24

## 2020-04-21 RX ADMIN — Medication 2: at 17:29

## 2020-04-21 RX ADMIN — PANTOPRAZOLE SODIUM 40 MILLIGRAM(S): 20 TABLET, DELAYED RELEASE ORAL at 12:25

## 2020-04-21 RX ADMIN — Medication 3 UNIT(S): at 12:24

## 2020-04-21 RX ADMIN — Medication 3 UNIT(S): at 08:53

## 2020-04-21 RX ADMIN — GABAPENTIN 300 MILLIGRAM(S): 400 CAPSULE ORAL at 20:31

## 2020-04-21 RX ADMIN — LACOSAMIDE 150 MILLIGRAM(S): 50 TABLET ORAL at 17:30

## 2020-04-21 RX ADMIN — LACOSAMIDE 150 MILLIGRAM(S): 50 TABLET ORAL at 05:31

## 2020-04-21 RX ADMIN — ATORVASTATIN CALCIUM 40 MILLIGRAM(S): 80 TABLET, FILM COATED ORAL at 20:31

## 2020-04-21 RX ADMIN — Medication 500000 UNIT(S): at 17:29

## 2020-04-21 RX ADMIN — Medication 400 MILLIGRAM(S): at 08:54

## 2020-04-21 RX ADMIN — Medication 15 MILLIGRAM(S): at 05:31

## 2020-04-21 RX ADMIN — Medication 3 UNIT(S): at 17:30

## 2020-04-21 RX ADMIN — FLUCONAZOLE 200 MILLIGRAM(S): 150 TABLET ORAL at 12:24

## 2020-04-21 RX ADMIN — AMLODIPINE BESYLATE 10 MILLIGRAM(S): 2.5 TABLET ORAL at 05:31

## 2020-04-21 NOTE — CHART NOTE - NSCHARTNOTEFT_GEN_A_CORE
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        Source of Information:  Unable to conduct a face to face interview or nutrition-focused physical exam due to limited contact restrictions related to Pt's medical condition and isolation precautions. Information obtained via phone call with pt and from EMR. Patient receptive. Able to carry on conversation without extreme SOB.    Current Diet: Consistent CHO diet with Glucerna 8 ounces x3  No Pork    Subjective Information: Patient reports a poor appetite in general.  Does not care for hospital food as she is used to her own cultural foods which includes, Roti, noodles, Yams, pumpkin.  Patient has been seen by nutrition services in past and has known hx of noncompliance to diet as well as chronic steroid use.  Patient unable to discuss A1c hx and when she check fingersticks at home, notes that they run 300-400.  Patient admits that for the last 5 weeks she has not been taking her insulin and meds since she was eating poorly, sick. Food recall prior to hospitalization is heavy starches.  Complains of nausea but better controlled with antiemetics.  Reinforced importance of getting control over her A1c and following up with her endocrinologist.  Discussed meal planning, portion sizes as well as importance of proteins with every meal and snack.  Also discussed benefits of blood sugar control with reduced complications.  Patient able to teach back a few points but not fully engaged with importance.      Objective Information: Finger sticks -   WT- 125 pounds  Calorie needs- 1420- 1700 kcals- (25-30 kcals/kg BW), Pro needs- 57-68 gms- (1.0-1.2 gms/kg BW)    Previous Nutrition Diagnosis: altered nutrition related labs related to endocrine dysfunction, behavioral issues and knowledge deficit evidenced by A1c 14%, lack of dietary discretion, high starch intake, steroid intake.         Nutrition Care Plan:  [x ] In progress   [ ] Achieved    Nutrition Interventions: continue Consistent CHO diet with snack, reinforced nutrition education for meal planning and self monitoring.    Monitoring and Evaluation:   Continue to monitor Nutritional intake, Tolerance to diet prescription, weights, labs, skin integrity    RD remains available upon request and will follow up per protocol    Nicole Yu MA,RD,CHES,CDN  Beeper [phone]

## 2020-04-21 NOTE — PROGRESS NOTE ADULT - SUBJECTIVE AND OBJECTIVE BOX
Chief Complaint/Follow-up on: T2DM    Subjective: Patient evaluated by RD. Noted to have have a poor diet inpatient as she is used to eating her cultural foods including roti, yams, etc. Spoke with RN, patient is minimally eating but drinking glucerna. Also has oral thrush and will start treatment as per primary team which may improve her diet.    Diet Ordered: Cons carb diet with glucerna can TID    MEDICATIONS  (STANDING):  amLODIPine   Tablet 10 milliGRAM(s) Oral daily  atorvastatin 40 milliGRAM(s) Oral at bedtime  dextrose 5%. 1000 milliLiter(s) (50 mL/Hr) IV Continuous <Continuous>  dextrose 50% Injectable 12.5 Gram(s) IV Push once  dextrose 50% Injectable 25 Gram(s) IV Push once  dextrose 50% Injectable 25 Gram(s) IV Push once  enoxaparin Injectable 40 milliGRAM(s) SubCutaneous daily  fluconAZOLE   Tablet 200 milliGRAM(s) Oral daily  gabapentin 300 milliGRAM(s) Oral at bedtime  hydroxychloroquine   Oral   hydroxychloroquine 400 milliGRAM(s) Oral every 24 hours  insulin glargine Injectable (LANTUS) 10 Unit(s) SubCutaneous at bedtime  insulin lispro (HumaLOG) corrective regimen sliding scale LOW  SubCutaneous three times a day before meals  insulin lispro (HumaLOG) corrective regimen sliding scale LOW  SubCutaneous at bedtime  insulin lispro Injectable (HumaLOG) 3 Unit(s) SubCutaneous three times a day before meals  lacosamide 150 milliGRAM(s) Oral two times a day  multivitamin/minerals 1 Tablet(s) Oral daily  pantoprazole  Injectable 40 milliGRAM(s) IV Push daily  phenytoin   Capsule 300 milliGRAM(s) Oral at bedtime  predniSONE   Tablet 15 milliGRAM(s) Oral daily    MEDICATIONS  (PRN):  acetaminophen   Tablet .. 650 milliGRAM(s) Oral every 6 hours PRN Temp greater or equal to 38C (100.4F), Mild Pain (1 - 3), Moderate Pain (4 - 6)  ALBUTerol    90 MICROgram(s) HFA Inhaler 1 Puff(s) Inhalation every 4 hours PRN Shortness of Breath and/or Wheezing  dextrose 40% Gel 15 Gram(s) Oral once PRN Blood Glucose LESS THAN 70 milliGRAM(s)/deciliter  glucagon  Injectable 1 milliGRAM(s) IntraMuscular once PRN Glucose LESS THAN 70 milligrams/deciliter  ondansetron Injectable 4 milliGRAM(s) IV Push every 6 hours PRN Nausea and/or Vomiting      PHYSICAL EXAM:  VITALS: T(C): 38.3 (04-21-20 @ 09:21)  T(F): 101 (04-21-20 @ 09:21), Max: 101 (04-21-20 @ 09:21)  HR: 109 (04-21-20 @ 09:21) (89 - 109)  BP: 124/77 (04-21-20 @ 09:21) (107/70 - 124/77)  RR:  (22 - 24)  SpO2:  (92% - 95%) on NC  Wt(kg): 56.9 kg    Due to [facility] policy during the evolving novel coronavirus outbreak, efforts are being made to limit unnecessary patient contacts to limit the spread of disease. Accordingly, patients without clear indication for physical exam or face to face interview from the Endocrine Team will be adjusted in conjunction with conversations with primary provider team. This is being done for the safety of all patients we care for.    As per Hospitalist Attending Progress Note 4/20:  PHYSICAL EXAM:   GENERAL: NAD, well-developed  HEAD:  Atraumatic, Normocephalic  EYES: Conjunctiva and sclera clear  NECK: Supple   CHEST/LUNG: Crackles.   HEART: S1S2 normal. Regular rate and rhythm; No murmurs, rubs, or gallops  ABDOMEN: Soft, Nontender, Nondistended; Bowel sounds present  EXTREMITIES: No clubbing, cyanosis, or edema  PSYCH/Neuro: AAOx3. Non-focal.   SKIN: No rashes or lesions        POCT Blood Glucose.: 126 mg/dL (04-21-20 @ 12:01) Hum 3  POCT Blood Glucose.: 132 mg/dL (04-21-20 @ 08:24) Hum 3    POCT Blood Glucose.: 97 mg/dL (04-20-20 @ 20:58) Lantus 10  POCT Blood Glucose.: 249 mg/dL (04-20-20 @ 16:59) Hum 3+2  POCT Blood Glucose.: 179 mg/dL (04-20-20 @ 13:04) Hum 3+1  POCT Blood Glucose.: 196 mg/dL (04-20-20 @ 09:14) Hum 8+1  POCT Blood Glucose.: 107 mg/dL (04-20-20 @ 05:46) Prednisone 15    POCT Blood Glucose.: 90 mg/dL (04-19-20 @ 20:46)  POCT Blood Glucose.: 54 mg/dL (04-19-20 @ 20:45)  POCT Blood Glucose.: 112 mg/dL (04-19-20 @ 16:57)  POCT Blood Glucose.: 117 mg/dL (04-19-20 @ 12:04)  POCT Blood Glucose.: 125 mg/dL (04-19-20 @ 08:25) Prednisone 15    POCT Blood Glucose.: 165 mg/dL (04-18-20 @ 22:10)  POCT Blood Glucose.: 204 mg/dL (04-18-20 @ 16:59)  POCT Blood Glucose.: 179 mg/dL (04-18-20 @ 16:04)  POCT Blood Glucose.: 187 mg/dL (04-18-20 @ 15:21)  POCT Blood Glucose.: 182 mg/dL (04-18-20 @ 14:15)  POCT Blood Glucose.: 174 mg/dL (04-18-20 @ 13:07)    04-19    145  |  110<H>  |  31<H>  ----------------------------<  78  4.4   |  22  |  1.38<H>    EGFR if : 53<L>  EGFR if non : 45<L>    Ca    9.4      04-19  Mg     1.8     04-19  Phos  2.3     04-19    TPro  7.1  /  Alb  3.1<L>  /  TBili  0.2  /  DBili  x   /  AST  21  /  ALT  5<L>  /  AlkPhos  38<L>  04-19

## 2020-04-21 NOTE — PROGRESS NOTE ADULT - SUBJECTIVE AND OBJECTIVE BOX
Patient is a 49y old  Female who presents with a chief complaint of HHS and COVID (20 Apr 2020 14:19)        SUBJECTIVE / OVERNIGHT EVENTS: Patient still reports some cough and SOB. Reduced taste and tongue burning making PO intake difficult.       MEDICATIONS  (STANDING):  amLODIPine   Tablet 10 milliGRAM(s) Oral daily  atorvastatin 40 milliGRAM(s) Oral at bedtime  dextrose 5%. 1000 milliLiter(s) (50 mL/Hr) IV Continuous <Continuous>  dextrose 50% Injectable 12.5 Gram(s) IV Push once  dextrose 50% Injectable 25 Gram(s) IV Push once  dextrose 50% Injectable 25 Gram(s) IV Push once  enoxaparin Injectable 40 milliGRAM(s) SubCutaneous daily  fluconAZOLE   Tablet 200 milliGRAM(s) Oral daily  gabapentin 300 milliGRAM(s) Oral at bedtime  hydroxychloroquine   Oral   hydroxychloroquine 400 milliGRAM(s) Oral every 24 hours  insulin glargine Injectable (LANTUS) 10 Unit(s) SubCutaneous at bedtime  insulin lispro (HumaLOG) corrective regimen sliding scale   SubCutaneous three times a day before meals  insulin lispro (HumaLOG) corrective regimen sliding scale   SubCutaneous at bedtime  insulin lispro Injectable (HumaLOG) 3 Unit(s) SubCutaneous three times a day before meals  lacosamide 150 milliGRAM(s) Oral two times a day  multivitamin/minerals 1 Tablet(s) Oral daily  nystatin    Suspension 232168 Unit(s) Oral four times a day  pantoprazole    Tablet 40 milliGRAM(s) Oral before breakfast  phenytoin   Capsule 300 milliGRAM(s) Oral at bedtime  predniSONE   Tablet 15 milliGRAM(s) Oral daily    MEDICATIONS  (PRN):  acetaminophen   Tablet .. 650 milliGRAM(s) Oral every 6 hours PRN Temp greater or equal to 38C (100.4F), Mild Pain (1 - 3), Moderate Pain (4 - 6)  ALBUTerol    90 MICROgram(s) HFA Inhaler 1 Puff(s) Inhalation every 4 hours PRN Shortness of Breath and/or Wheezing  dextrose 40% Gel 15 Gram(s) Oral once PRN Blood Glucose LESS THAN 70 milliGRAM(s)/deciliter  glucagon  Injectable 1 milliGRAM(s) IntraMuscular once PRN Glucose LESS THAN 70 milligrams/deciliter  ondansetron Injectable 4 milliGRAM(s) IV Push every 6 hours PRN Nausea and/or Vomiting      Vital Signs Last 24 Hrs  T(C): 36.7 (21 Apr 2020 16:44), Max: 38.3 (21 Apr 2020 09:21)  T(F): 98 (21 Apr 2020 16:44), Max: 101 (21 Apr 2020 09:21)  HR: 83 (21 Apr 2020 16:44) (83 - 109)  BP: 117/89 (21 Apr 2020 16:44) (117/89 - 124/77)  BP(mean): --  RR: 20 (21 Apr 2020 16:44) (20 - 24)  SpO2: 94% (21 Apr 2020 16:44) (94% - 95%)  CAPILLARY BLOOD GLUCOSE      POCT Blood Glucose.: 218 mg/dL (21 Apr 2020 16:41)  POCT Blood Glucose.: 126 mg/dL (21 Apr 2020 12:01)  POCT Blood Glucose.: 132 mg/dL (21 Apr 2020 08:24)  POCT Blood Glucose.: 97 mg/dL (20 Apr 2020 20:58)    I&O's Summary    20 Apr 2020 07:01  -  21 Apr 2020 07:00  --------------------------------------------------------  IN: 120 mL / OUT: 0 mL / NET: 120 mL    21 Apr 2020 07:01  -  21 Apr 2020 17:08  --------------------------------------------------------  IN: 660 mL / OUT: 0 mL / NET: 660 mL          PHYSICAL EXAM:   GENERAL: NAD, well-developed  HEAD:  Atraumatic, Normocephalic; oral thrush noted on tongue.   EYES: Conjunctiva and sclera clear  NECK: Supple   CHEST/LUNG: Crackles noted.   HEART: S1S2 normal. Regular rate and rhythm; No murmurs, rubs, or gallops  ABDOMEN: Soft, Nontender, Nondistended; Bowel sounds present  EXTREMITIES:   No clubbing, cyanosis, or edema  PSYCH/Neuro: AAOx3. Non-focal.   SKIN: No rashes or lesions      LABS:              RADIOLOGY & ADDITIONAL TESTS:    Imaging Personally Reviewed:  Consultant(s) Notes Reviewed:  Endo  Care Discussed with Consultants/Other Providers:

## 2020-04-21 NOTE — PROGRESS NOTE ADULT - PROBLEM SELECTOR PLAN 1
-Plaquenil (4/18-4/22), QTc 423 (4/18)  -titrate supp O2 to maintain sat 92-96%, currently on 4L NC; wean as tolerated.   -If worsens clinically will consider Anakinra or tociluzimab or clinical trial.   -If becomes hypotensive, may require stress-dose steroids due to chronic prednisone use.

## 2020-04-21 NOTE — PROGRESS NOTE ADULT - ASSESSMENT
48 y/o female with uncontrolled T2DM (A1c 14%) with history of pulmonary and neurosarcoidosis and h/o seizure on steroids at home here with COVID-19 infection. Patient continues to be febrile while on Prednisone 15 mg QD. Also with h/o HTN and HLD.

## 2020-04-22 DIAGNOSIS — E87.5 HYPERKALEMIA: ICD-10-CM

## 2020-04-22 LAB
ALBUMIN SERPL ELPH-MCNC: 3.1 G/DL — LOW (ref 3.3–5)
ALP SERPL-CCNC: 65 U/L — SIGNIFICANT CHANGE UP (ref 40–120)
ALT FLD-CCNC: <5 U/L — LOW (ref 10–45)
ANION GAP SERPL CALC-SCNC: 12 MMOL/L — SIGNIFICANT CHANGE UP (ref 5–17)
AST SERPL-CCNC: 15 U/L — SIGNIFICANT CHANGE UP (ref 10–40)
BILIRUB SERPL-MCNC: 0.2 MG/DL — SIGNIFICANT CHANGE UP (ref 0.2–1.2)
BUN SERPL-MCNC: 19 MG/DL — SIGNIFICANT CHANGE UP (ref 7–23)
CALCIUM SERPL-MCNC: 9.1 MG/DL — SIGNIFICANT CHANGE UP (ref 8.4–10.5)
CHLORIDE SERPL-SCNC: 101 MMOL/L — SIGNIFICANT CHANGE UP (ref 96–108)
CO2 SERPL-SCNC: 25 MMOL/L — SIGNIFICANT CHANGE UP (ref 22–31)
CREAT SERPL-MCNC: 1.24 MG/DL — SIGNIFICANT CHANGE UP (ref 0.5–1.3)
GLUCOSE BLDC GLUCOMTR-MCNC: 197 MG/DL — HIGH (ref 70–99)
GLUCOSE BLDC GLUCOMTR-MCNC: 275 MG/DL — HIGH (ref 70–99)
GLUCOSE BLDC GLUCOMTR-MCNC: 290 MG/DL — HIGH (ref 70–99)
GLUCOSE BLDC GLUCOMTR-MCNC: 392 MG/DL — HIGH (ref 70–99)
GLUCOSE BLDC GLUCOMTR-MCNC: 89 MG/DL — SIGNIFICANT CHANGE UP (ref 70–99)
GLUCOSE SERPL-MCNC: 158 MG/DL — HIGH (ref 70–99)
HCT VFR BLD CALC: 33.7 % — LOW (ref 34.5–45)
HGB BLD-MCNC: 10.8 G/DL — LOW (ref 11.5–15.5)
MCHC RBC-ENTMCNC: 27.2 PG — SIGNIFICANT CHANGE UP (ref 27–34)
MCHC RBC-ENTMCNC: 32 GM/DL — SIGNIFICANT CHANGE UP (ref 32–36)
MCV RBC AUTO: 84.9 FL — SIGNIFICANT CHANGE UP (ref 80–100)
NRBC # BLD: 0 /100 WBCS — SIGNIFICANT CHANGE UP (ref 0–0)
PLATELET # BLD AUTO: 369 K/UL — SIGNIFICANT CHANGE UP (ref 150–400)
POTASSIUM SERPL-MCNC: 5.4 MMOL/L — HIGH (ref 3.5–5.3)
POTASSIUM SERPL-SCNC: 5.4 MMOL/L — HIGH (ref 3.5–5.3)
PROT SERPL-MCNC: 7.5 G/DL — SIGNIFICANT CHANGE UP (ref 6–8.3)
RBC # BLD: 3.97 M/UL — SIGNIFICANT CHANGE UP (ref 3.8–5.2)
RBC # FLD: 12 % — SIGNIFICANT CHANGE UP (ref 10.3–14.5)
SODIUM SERPL-SCNC: 138 MMOL/L — SIGNIFICANT CHANGE UP (ref 135–145)
WBC # BLD: 7.63 K/UL — SIGNIFICANT CHANGE UP (ref 3.8–10.5)
WBC # FLD AUTO: 7.63 K/UL — SIGNIFICANT CHANGE UP (ref 3.8–10.5)

## 2020-04-22 PROCEDURE — 99233 SBSQ HOSP IP/OBS HIGH 50: CPT

## 2020-04-22 PROCEDURE — 99232 SBSQ HOSP IP/OBS MODERATE 35: CPT | Mod: GC

## 2020-04-22 RX ORDER — INSULIN LISPRO 100/ML
VIAL (ML) SUBCUTANEOUS
Refills: 0 | Status: DISCONTINUED | OUTPATIENT
Start: 2020-04-22 | End: 2020-04-27

## 2020-04-22 RX ORDER — INSULIN LISPRO 100/ML
8 VIAL (ML) SUBCUTANEOUS
Refills: 0 | Status: DISCONTINUED | OUTPATIENT
Start: 2020-04-22 | End: 2020-04-23

## 2020-04-22 RX ORDER — INSULIN GLARGINE 100 [IU]/ML
15 INJECTION, SOLUTION SUBCUTANEOUS AT BEDTIME
Refills: 0 | Status: DISCONTINUED | OUTPATIENT
Start: 2020-04-22 | End: 2020-04-25

## 2020-04-22 RX ORDER — SODIUM ZIRCONIUM CYCLOSILICATE 10 G/10G
10 POWDER, FOR SUSPENSION ORAL ONCE
Refills: 0 | Status: COMPLETED | OUTPATIENT
Start: 2020-04-22 | End: 2020-04-22

## 2020-04-22 RX ORDER — INSULIN LISPRO 100/ML
VIAL (ML) SUBCUTANEOUS AT BEDTIME
Refills: 0 | Status: DISCONTINUED | OUTPATIENT
Start: 2020-04-22 | End: 2020-04-27

## 2020-04-22 RX ADMIN — Medication 1 TABLET(S): at 12:03

## 2020-04-22 RX ADMIN — Medication 3: at 08:10

## 2020-04-22 RX ADMIN — GABAPENTIN 300 MILLIGRAM(S): 400 CAPSULE ORAL at 21:40

## 2020-04-22 RX ADMIN — Medication 2: at 17:17

## 2020-04-22 RX ADMIN — Medication 400 MILLIGRAM(S): at 08:14

## 2020-04-22 RX ADMIN — ATORVASTATIN CALCIUM 40 MILLIGRAM(S): 80 TABLET, FILM COATED ORAL at 21:40

## 2020-04-22 RX ADMIN — Medication 3 UNIT(S): at 08:10

## 2020-04-22 RX ADMIN — INSULIN GLARGINE 15 UNIT(S): 100 INJECTION, SOLUTION SUBCUTANEOUS at 21:39

## 2020-04-22 RX ADMIN — Medication 500000 UNIT(S): at 05:41

## 2020-04-22 RX ADMIN — SODIUM ZIRCONIUM CYCLOSILICATE 10 GRAM(S): 10 POWDER, FOR SUSPENSION ORAL at 17:16

## 2020-04-22 RX ADMIN — Medication 300 MILLIGRAM(S): at 21:40

## 2020-04-22 RX ADMIN — LACOSAMIDE 150 MILLIGRAM(S): 50 TABLET ORAL at 17:16

## 2020-04-22 RX ADMIN — Medication 5: at 12:04

## 2020-04-22 RX ADMIN — ENOXAPARIN SODIUM 40 MILLIGRAM(S): 100 INJECTION SUBCUTANEOUS at 12:03

## 2020-04-22 RX ADMIN — Medication 1: at 00:26

## 2020-04-22 RX ADMIN — Medication 500000 UNIT(S): at 00:28

## 2020-04-22 RX ADMIN — Medication 500000 UNIT(S): at 12:03

## 2020-04-22 RX ADMIN — INSULIN GLARGINE 10 UNIT(S): 100 INJECTION, SOLUTION SUBCUTANEOUS at 00:28

## 2020-04-22 RX ADMIN — FLUCONAZOLE 200 MILLIGRAM(S): 150 TABLET ORAL at 12:03

## 2020-04-22 RX ADMIN — Medication 3 UNIT(S): at 12:04

## 2020-04-22 RX ADMIN — Medication 650 MILLIGRAM(S): at 05:41

## 2020-04-22 RX ADMIN — Medication 8 UNIT(S): at 17:17

## 2020-04-22 RX ADMIN — Medication 15 MILLIGRAM(S): at 05:41

## 2020-04-22 RX ADMIN — LACOSAMIDE 150 MILLIGRAM(S): 50 TABLET ORAL at 05:41

## 2020-04-22 RX ADMIN — AMLODIPINE BESYLATE 10 MILLIGRAM(S): 2.5 TABLET ORAL at 05:41

## 2020-04-22 RX ADMIN — PANTOPRAZOLE SODIUM 40 MILLIGRAM(S): 20 TABLET, DELAYED RELEASE ORAL at 05:41

## 2020-04-22 RX ADMIN — Medication 500000 UNIT(S): at 17:16

## 2020-04-22 NOTE — PROGRESS NOTE ADULT - SUBJECTIVE AND OBJECTIVE BOX
Patient is a 49y old  Female who presents with a chief complaint of COVID, HHS (21 Apr 2020 12:52)      INTERVAL History of Present Illness/OVERNIGHT EVENTS: feels ok.  generalized weakness    MEDICATIONS  (STANDING):  amLODIPine   Tablet 10 milliGRAM(s) Oral daily  atorvastatin 40 milliGRAM(s) Oral at bedtime  dextrose 5%. 1000 milliLiter(s) (50 mL/Hr) IV Continuous <Continuous>  dextrose 50% Injectable 12.5 Gram(s) IV Push once  dextrose 50% Injectable 25 Gram(s) IV Push once  dextrose 50% Injectable 25 Gram(s) IV Push once  enoxaparin Injectable 40 milliGRAM(s) SubCutaneous daily  fluconAZOLE   Tablet 200 milliGRAM(s) Oral daily  gabapentin 300 milliGRAM(s) Oral at bedtime  insulin glargine Injectable (LANTUS) 15 Unit(s) SubCutaneous at bedtime  insulin lispro (HumaLOG) corrective regimen sliding scale   SubCutaneous three times a day before meals  insulin lispro (HumaLOG) corrective regimen sliding scale   SubCutaneous at bedtime  insulin lispro Injectable (HumaLOG) 8 Unit(s) SubCutaneous three times a day before meals  lacosamide 150 milliGRAM(s) Oral two times a day  multivitamin/minerals 1 Tablet(s) Oral daily  nystatin    Suspension 585659 Unit(s) Oral four times a day  pantoprazole    Tablet 40 milliGRAM(s) Oral before breakfast  phenytoin   Capsule 300 milliGRAM(s) Oral at bedtime  predniSONE   Tablet 15 milliGRAM(s) Oral daily    MEDICATIONS  (PRN):  acetaminophen   Tablet .. 650 milliGRAM(s) Oral every 6 hours PRN Temp greater or equal to 38C (100.4F), Mild Pain (1 - 3), Moderate Pain (4 - 6)  ALBUTerol    90 MICROgram(s) HFA Inhaler 1 Puff(s) Inhalation every 4 hours PRN Shortness of Breath and/or Wheezing  dextrose 40% Gel 15 Gram(s) Oral once PRN Blood Glucose LESS THAN 70 milliGRAM(s)/deciliter  glucagon  Injectable 1 milliGRAM(s) IntraMuscular once PRN Glucose LESS THAN 70 milligrams/deciliter  ondansetron Injectable 4 milliGRAM(s) IV Push every 6 hours PRN Nausea and/or Vomiting      Allergies    No Known Allergies    Intolerances        REVIEW OF SYSTEMS:  Negative unless otherwise specified above.    Vital Signs Last 24 Hrs  T(C): 36.8 (22 Apr 2020 10:00), Max: 37.9 (22 Apr 2020 05:39)  T(F): 98.3 (22 Apr 2020 10:00), Max: 100.2 (22 Apr 2020 05:39)  HR: 105 (22 Apr 2020 10:00) (83 - 115)  BP: 112/75 (22 Apr 2020 10:00) (112/75 - 148/92)  BP(mean): --  RR: 24 (22 Apr 2020 10:00) (20 - 24)  SpO2: 94% (22 Apr 2020 10:00) (94% - 95%)        PHYSICAL EXAM:  GENERAL: No apparent distress, appears stated age  HEAD:  Atraumatic, Normocephalic  EYES: Conjunctiva and sclera clear, no discharge  CHEST/LUNG: Normal respirations  HEART: Regular rhythm  ABDOMEN: Nondistended  EXTREMITIES:  No clubbing, cyanosis or edema  SKIN: No rash or new discoloration  NERVOUS SYSTEM:  Alert & Oriented; Bilateral Lower extremity mobile, sensation to light touch intact      LABS:                        10.8   7.63  )-----------( 369      ( 22 Apr 2020 05:52 )             33.7     22 Apr 2020 05:52    138    |  101    |  19     ----------------------------<  158    5.4     |  25     |  1.24     Ca    9.1        22 Apr 2020 05:52    TPro  7.5    /  Alb  3.1    /  TBili  0.2    /  DBili  x      /  AST  15     /  ALT  <5     /  AlkPhos  65     22 Apr 2020 05:52        CAPILLARY BLOOD GLUCOSE      POCT Blood Glucose.: 392 mg/dL (22 Apr 2020 11:47)  POCT Blood Glucose.: 290 mg/dL (22 Apr 2020 08:02)  POCT Blood Glucose.: 275 mg/dL (22 Apr 2020 00:23)  POCT Blood Glucose.: 71 mg/dL (21 Apr 2020 20:37)  POCT Blood Glucose.: 218 mg/dL (21 Apr 2020 16:41)      RADIOLOGY & ADDITIONAL TESTS:    Images reviewed personally    Consultant Notes Reviewed and Care Discussed with relevant Consultants/Other Providers.

## 2020-04-22 NOTE — PROGRESS NOTE ADULT - PROBLEM SELECTOR PLAN 1
- increase  Lantus to 15 units QHS   - increase Humalog to 8 units TID AC  - humalog moderate dose sliding scale tidac and qhs   - If Prednisone 15 mg dose is changed, please notify our team as she will need less insulin doses as well    Outpatient plan  - C-peptide 1.0, will discharge on insulin. A1c is 14%, glucose was in 200-300s even when she was taking her lantus 37 and humalog 20 units tidac at home but with hypoglycemic events depending on what she eats. Diet is carb heavy.   - F/u Endocrinology with [facility] at [address] [facility], call [phone] for apt - increase Lantus to 15 units QHS   - increase Humalog to 8 units TID AC  - humalog moderate dose sliding scale tidac and qhs   - If Prednisone 15 mg dose is changed, please notify our team as she will need less insulin doses as well    Outpatient plan  - C-peptide 1.0, will discharge on insulin. A1c is 14%, glucose was in 200-300s even when she was taking her lantus 37 and humalog 20 units tidac at home but with hypoglycemic events depending on what she eats. Diet is carb heavy.   - F/u Endocrinology with [facility] at [address] [facility], call [phone] for apt

## 2020-04-22 NOTE — PROGRESS NOTE ADULT - ASSESSMENT
50 y/o female with uncontrolled T2DM (A1c 14%) with history of pulmonary and neurosarcoidosis and h/o seizure on steroids at home here with COVID-19 infection. Patient continues to be febrile while on Prednisone 15 mg QD. Also with h/o HTN and HLD.

## 2020-04-22 NOTE — PROGRESS NOTE ADULT - SUBJECTIVE AND OBJECTIVE BOX
Chief Complaint:     History:    MEDICATIONS  (STANDING):  amLODIPine   Tablet 10 milliGRAM(s) Oral daily  atorvastatin 40 milliGRAM(s) Oral at bedtime  dextrose 5%. 1000 milliLiter(s) (50 mL/Hr) IV Continuous <Continuous>  dextrose 50% Injectable 12.5 Gram(s) IV Push once  dextrose 50% Injectable 25 Gram(s) IV Push once  dextrose 50% Injectable 25 Gram(s) IV Push once  enoxaparin Injectable 40 milliGRAM(s) SubCutaneous daily  fluconAZOLE   Tablet 200 milliGRAM(s) Oral daily  gabapentin 300 milliGRAM(s) Oral at bedtime  insulin glargine Injectable (LANTUS) 10 Unit(s) SubCutaneous at bedtime  insulin lispro (HumaLOG) corrective regimen sliding scale   SubCutaneous three times a day before meals  insulin lispro (HumaLOG) corrective regimen sliding scale   SubCutaneous at bedtime  insulin lispro Injectable (HumaLOG) 3 Unit(s) SubCutaneous three times a day before meals  lacosamide 150 milliGRAM(s) Oral two times a day  multivitamin/minerals 1 Tablet(s) Oral daily  nystatin    Suspension 069687 Unit(s) Oral four times a day  pantoprazole    Tablet 40 milliGRAM(s) Oral before breakfast  phenytoin   Capsule 300 milliGRAM(s) Oral at bedtime  predniSONE   Tablet 15 milliGRAM(s) Oral daily    MEDICATIONS  (PRN):  acetaminophen   Tablet .. 650 milliGRAM(s) Oral every 6 hours PRN Temp greater or equal to 38C (100.4F), Mild Pain (1 - 3), Moderate Pain (4 - 6)  ALBUTerol    90 MICROgram(s) HFA Inhaler 1 Puff(s) Inhalation every 4 hours PRN Shortness of Breath and/or Wheezing  dextrose 40% Gel 15 Gram(s) Oral once PRN Blood Glucose LESS THAN 70 milliGRAM(s)/deciliter  glucagon  Injectable 1 milliGRAM(s) IntraMuscular once PRN Glucose LESS THAN 70 milligrams/deciliter  ondansetron Injectable 4 milliGRAM(s) IV Push every 6 hours PRN Nausea and/or Vomiting      Allergies    No Known Allergies    Intolerances        ROS: All other systems reviewed and negative    PHYSICAL EXAM:  VITALS: T(C): 36.8 (04-22-20 @ 10:00)  T(F): 98.3 (04-22-20 @ 10:00), Max: 100.2 (04-22-20 @ 05:39)  HR: 105 (04-22-20 @ 10:00) (83 - 115)  BP: 112/75 (04-22-20 @ 10:00) (112/75 - 148/92)  RR:  (20 - 24)  SpO2:  (94% - 95%)  Wt(kg): --      POCT Blood Glucose.: 392 mg/dL (04-22-20 @ 11:47)H3+5  POCT Blood Glucose.: 290 mg/dL (04-22-20 @ 08:02)H3+3 Prednisone 15 mg   POCT Blood Glucose.: 275 mg/dL (04-22-20 @ 00:23)  POCT Blood Glucose.: 71 mg/dL (04-21-20 @ 20:37)L10  POCT Blood Glucose.: 218 mg/dL (04-21-20 @ 16:41)H3+2    POCT Blood Glucose.: 126 mg/dL (04-21-20 @ 12:01) Hum 3  POCT Blood Glucose.: 132 mg/dL (04-21-20 @ 08:24) Hum 3    POCT Blood Glucose.: 97 mg/dL (04-20-20 @ 20:58) Lantus 10  POCT Blood Glucose.: 249 mg/dL (04-20-20 @ 16:59) Hum 3+2  POCT Blood Glucose.: 179 mg/dL (04-20-20 @ 13:04) Hum 3+1  POCT Blood Glucose.: 196 mg/dL (04-20-20 @ 09:14) Hum 8+1  POCT Blood Glucose.: 107 mg/dL (04-20-20 @ 05:46) Prednisone 15      04-22    138  |  101  |  19  ----------------------------<  158<H>  5.4<H>   |  25  |  1.24    EGFR if : 60  EGFR if non : 52<L>    Ca    9.1      04-22    TPro  7.5  /  Alb  3.1<L>  /  TBili  0.2  /  DBili  x   /  AST  15  /  ALT  <5<L>  /  AlkPhos  65  04-22          Thyroid Function Tests: Chief Complaint: T2DM     History: Appetite improved today now that patient is feeling less nauseous. She had home food brought for her last night and ate it close to bedtime. No complaints today.     MEDICATIONS  (STANDING):  amLODIPine   Tablet 10 milliGRAM(s) Oral daily  atorvastatin 40 milliGRAM(s) Oral at bedtime  dextrose 5%. 1000 milliLiter(s) (50 mL/Hr) IV Continuous <Continuous>  dextrose 50% Injectable 12.5 Gram(s) IV Push once  dextrose 50% Injectable 25 Gram(s) IV Push once  dextrose 50% Injectable 25 Gram(s) IV Push once  enoxaparin Injectable 40 milliGRAM(s) SubCutaneous daily  fluconAZOLE   Tablet 200 milliGRAM(s) Oral daily  gabapentin 300 milliGRAM(s) Oral at bedtime  insulin glargine Injectable (LANTUS) 10 Unit(s) SubCutaneous at bedtime  insulin lispro (HumaLOG) corrective regimen sliding scale   SubCutaneous three times a day before meals  insulin lispro (HumaLOG) corrective regimen sliding scale   SubCutaneous at bedtime  insulin lispro Injectable (HumaLOG) 3 Unit(s) SubCutaneous three times a day before meals  lacosamide 150 milliGRAM(s) Oral two times a day  multivitamin/minerals 1 Tablet(s) Oral daily  nystatin    Suspension [id number] Unit(s) Oral four times a day  pantoprazole    Tablet 40 milliGRAM(s) Oral before breakfast  phenytoin   Capsule 300 milliGRAM(s) Oral at bedtime  predniSONE   Tablet 15 milliGRAM(s) Oral daily    MEDICATIONS  (PRN):  acetaminophen   Tablet .. 650 milliGRAM(s) Oral every 6 hours PRN Temp greater or equal to 38C (100.4F), Mild Pain (1 - 3), Moderate Pain (4 - 6)  ALBUTerol    90 MICROgram(s) HFA Inhaler 1 Puff(s) Inhalation every 4 hours PRN Shortness of Breath and/or Wheezing  dextrose 40% Gel 15 Gram(s) Oral once PRN Blood Glucose LESS THAN 70 milliGRAM(s)/deciliter  glucagon  Injectable 1 milliGRAM(s) IntraMuscular once PRN Glucose LESS THAN 70 milligrams/deciliter  ondansetron Injectable 4 milliGRAM(s) IV Push every 6 hours PRN Nausea and/or Vomiting      Allergies    No Known Allergies    Intolerances        ROS: All other systems reviewed and negative    PHYSICAL EXAM:  VITALS: T(C): 36.8 (04-22-20 @ 10:00)  T(F): 98.3 (04-22-20 @ 10:00), Max: 100.2 (04-22-20 @ 05:39)  HR: 105 (04-22-20 @ 10:00) (83 - 115)  BP: 112/75 (04-22-20 @ 10:00) (112/75 - 148/92)  RR:  (20 - 24)  SpO2:  (94% - 95%)  Wt(kg): --      POCT Blood Glucose.: 392 mg/dL (04-22-20 @ 11:47)H3+5  POCT Blood Glucose.: 290 mg/dL (04-22-20 @ 08:02)H3+3 Prednisone 15 mg   POCT Blood Glucose.: 275 mg/dL (04-22-20 @ 00:23)  POCT Blood Glucose.: 71 mg/dL (04-21-20 @ 20:37)L10  POCT Blood Glucose.: 218 mg/dL (04-21-20 @ 16:41)H3+2    POCT Blood Glucose.: 126 mg/dL (04-21-20 @ 12:01) Hum 3  POCT Blood Glucose.: 132 mg/dL (04-21-20 @ 08:24) Hum 3    POCT Blood Glucose.: 97 mg/dL (04-20-20 @ 20:58) Lantus 10  POCT Blood Glucose.: 249 mg/dL (04-20-20 @ 16:59) Hum 3+2  POCT Blood Glucose.: 179 mg/dL (04-20-20 @ 13:04) Hum 3+1  POCT Blood Glucose.: 196 mg/dL (04-20-20 @ 09:14) Hum 8+1  POCT Blood Glucose.: 107 mg/dL (04-20-20 @ 05:46) Prednisone 15      04-22    138  |  101  |  19  ----------------------------<  158<H>  5.4<H>   |  25  |  1.24    EGFR if : 60  EGFR if non : 52<L>    Ca    9.1      04-22    TPro  7.5  /  Alb  3.1<L>  /  TBili  0.2  /  DBili  x   /  AST  15  /  ALT  <5<L>  /  AlkPhos  65  04-22          Thyroid Function Tests: Chief Complaint: T2DM     History: Appetite improved today now that patient is feeling less nauseous. She had home food brought for her last night and ate it close to bedtime. No complaints today.     MEDICATIONS  (STANDING):  amLODIPine   Tablet 10 milliGRAM(s) Oral daily  atorvastatin 40 milliGRAM(s) Oral at bedtime  dextrose 5%. 1000 milliLiter(s) (50 mL/Hr) IV Continuous <Continuous>  dextrose 50% Injectable 12.5 Gram(s) IV Push once  dextrose 50% Injectable 25 Gram(s) IV Push once  dextrose 50% Injectable 25 Gram(s) IV Push once  enoxaparin Injectable 40 milliGRAM(s) SubCutaneous daily  fluconAZOLE   Tablet 200 milliGRAM(s) Oral daily  gabapentin 300 milliGRAM(s) Oral at bedtime  insulin glargine Injectable (LANTUS) 10 Unit(s) SubCutaneous at bedtime  insulin lispro (HumaLOG) corrective regimen sliding scale   SubCutaneous three times a day before meals  insulin lispro (HumaLOG) corrective regimen sliding scale   SubCutaneous at bedtime  insulin lispro Injectable (HumaLOG) 3 Unit(s) SubCutaneous three times a day before meals  lacosamide 150 milliGRAM(s) Oral two times a day  multivitamin/minerals 1 Tablet(s) Oral daily  nystatin    Suspension [id number] Unit(s) Oral four times a day  pantoprazole    Tablet 40 milliGRAM(s) Oral before breakfast  phenytoin   Capsule 300 milliGRAM(s) Oral at bedtime  predniSONE   Tablet 15 milliGRAM(s) Oral daily    MEDICATIONS  (PRN):  acetaminophen   Tablet .. 650 milliGRAM(s) Oral every 6 hours PRN Temp greater or equal to 38C (100.4F), Mild Pain (1 - 3), Moderate Pain (4 - 6)  ALBUTerol    90 MICROgram(s) HFA Inhaler 1 Puff(s) Inhalation every 4 hours PRN Shortness of Breath and/or Wheezing  dextrose 40% Gel 15 Gram(s) Oral once PRN Blood Glucose LESS THAN 70 milliGRAM(s)/deciliter  glucagon  Injectable 1 milliGRAM(s) IntraMuscular once PRN Glucose LESS THAN 70 milligrams/deciliter  ondansetron Injectable 4 milliGRAM(s) IV Push every 6 hours PRN Nausea and/or Vomiting      Allergies    No Known Allergies    Intolerances    ROS: All other systems reviewed and negative    PHYSICAL EXAM:  VITALS: T(C): 36.8 (04-22-20 @ 10:00)  T(F): 98.3 (04-22-20 @ 10:00), Max: 100.2 (04-22-20 @ 05:39)  HR: 105 (04-22-20 @ 10:00) (83 - 115)  BP: 112/75 (04-22-20 @ 10:00) (112/75 - 148/92)  RR:  (20 - 24)  SpO2:  (94% - 95%)  Wt(kg): --      POCT Blood Glucose.: 392 mg/dL (04-22-20 @ 11:47)H3+5  POCT Blood Glucose.: 290 mg/dL (04-22-20 @ 08:02)H3+3 Prednisone 15 mg   POCT Blood Glucose.: 275 mg/dL (04-22-20 @ 00:23)  POCT Blood Glucose.: 71 mg/dL (04-21-20 @ 20:37)L10  POCT Blood Glucose.: 218 mg/dL (04-21-20 @ 16:41)H3+2    POCT Blood Glucose.: 126 mg/dL (04-21-20 @ 12:01) Hum 3  POCT Blood Glucose.: 132 mg/dL (04-21-20 @ 08:24) Hum 3    POCT Blood Glucose.: 97 mg/dL (04-20-20 @ 20:58) Lantus 10  POCT Blood Glucose.: 249 mg/dL (04-20-20 @ 16:59) Hum 3+2  POCT Blood Glucose.: 179 mg/dL (04-20-20 @ 13:04) Hum 3+1  POCT Blood Glucose.: 196 mg/dL (04-20-20 @ 09:14) Hum 8+1  POCT Blood Glucose.: 107 mg/dL (04-20-20 @ 05:46) Prednisone 15      04-22    138  |  101  |  19  ----------------------------<  158<H>  5.4<H>   |  25  |  1.24    EGFR if : 60  EGFR if non : 52<L>    Ca    9.1      04-22    TPro  7.5  /  Alb  3.1<L>  /  TBili  0.2  /  DBili  x   /  AST  15  /  ALT  <5<L>  /  AlkPhos  65  04-22          Thyroid Function Tests:

## 2020-04-22 NOTE — PROGRESS NOTE ADULT - PROBLEM SELECTOR PLAN 1
-Plaquenil (4/18-4/22), QTc 423 (4/18)  -titrate supp O2 to maintain sat 92-96%, currently on 4L NC; wean as tolerated.   -If worsens clinically will consider Anakinra or tociluzimab or clinical trial.   -If becomes hypotensive, may require stress-dose steroids due to chronic prednisone use.  STABLE CLINICAL STATUS

## 2020-04-23 LAB
GLUCOSE BLDC GLUCOMTR-MCNC: 148 MG/DL — HIGH (ref 70–99)
GLUCOSE BLDC GLUCOMTR-MCNC: 171 MG/DL — HIGH (ref 70–99)
GLUCOSE BLDC GLUCOMTR-MCNC: 185 MG/DL — HIGH (ref 70–99)
GLUCOSE BLDC GLUCOMTR-MCNC: 192 MG/DL — HIGH (ref 70–99)
GLUCOSE BLDC GLUCOMTR-MCNC: 218 MG/DL — HIGH (ref 70–99)

## 2020-04-23 PROCEDURE — 99232 SBSQ HOSP IP/OBS MODERATE 35: CPT | Mod: GC

## 2020-04-23 PROCEDURE — 99233 SBSQ HOSP IP/OBS HIGH 50: CPT

## 2020-04-23 RX ORDER — INSULIN LISPRO 100/ML
7 VIAL (ML) SUBCUTANEOUS
Refills: 0 | Status: DISCONTINUED | OUTPATIENT
Start: 2020-04-23 | End: 2020-04-27

## 2020-04-23 RX ORDER — INSULIN LISPRO 100/ML
7 VIAL (ML) SUBCUTANEOUS
Refills: 0 | Status: DISCONTINUED | OUTPATIENT
Start: 2020-04-23 | End: 2020-04-23

## 2020-04-23 RX ORDER — INSULIN LISPRO 100/ML
8 VIAL (ML) SUBCUTANEOUS
Refills: 0 | Status: DISCONTINUED | OUTPATIENT
Start: 2020-04-23 | End: 2020-04-25

## 2020-04-23 RX ADMIN — INSULIN GLARGINE 15 UNIT(S): 100 INJECTION, SOLUTION SUBCUTANEOUS at 21:57

## 2020-04-23 RX ADMIN — Medication 15 MILLIGRAM(S): at 06:14

## 2020-04-23 RX ADMIN — ATORVASTATIN CALCIUM 40 MILLIGRAM(S): 80 TABLET, FILM COATED ORAL at 21:57

## 2020-04-23 RX ADMIN — Medication 300 MILLIGRAM(S): at 21:57

## 2020-04-23 RX ADMIN — Medication 2: at 08:36

## 2020-04-23 RX ADMIN — Medication 500000 UNIT(S): at 12:23

## 2020-04-23 RX ADMIN — Medication 7 UNIT(S): at 12:23

## 2020-04-23 RX ADMIN — Medication 4: at 12:23

## 2020-04-23 RX ADMIN — Medication 1 TABLET(S): at 12:23

## 2020-04-23 RX ADMIN — LACOSAMIDE 150 MILLIGRAM(S): 50 TABLET ORAL at 17:57

## 2020-04-23 RX ADMIN — LACOSAMIDE 150 MILLIGRAM(S): 50 TABLET ORAL at 06:14

## 2020-04-23 RX ADMIN — Medication 7 UNIT(S): at 17:58

## 2020-04-23 RX ADMIN — Medication 500000 UNIT(S): at 06:14

## 2020-04-23 RX ADMIN — PANTOPRAZOLE SODIUM 40 MILLIGRAM(S): 20 TABLET, DELAYED RELEASE ORAL at 06:14

## 2020-04-23 RX ADMIN — GABAPENTIN 300 MILLIGRAM(S): 400 CAPSULE ORAL at 21:57

## 2020-04-23 RX ADMIN — ENOXAPARIN SODIUM 40 MILLIGRAM(S): 100 INJECTION SUBCUTANEOUS at 12:23

## 2020-04-23 RX ADMIN — Medication 8 UNIT(S): at 08:36

## 2020-04-23 RX ADMIN — Medication 2: at 17:58

## 2020-04-23 RX ADMIN — FLUCONAZOLE 200 MILLIGRAM(S): 150 TABLET ORAL at 12:24

## 2020-04-23 RX ADMIN — AMLODIPINE BESYLATE 10 MILLIGRAM(S): 2.5 TABLET ORAL at 06:14

## 2020-04-23 NOTE — PROGRESS NOTE ADULT - SUBJECTIVE AND OBJECTIVE BOX
Patient is a 49y old  Female who presents with a chief complaint of covid (23 Apr 2020 10:13)      INTERVAL History of Present Illness/OVERNIGHT EVENTS: stable clinical status.  still on 6 L NC    MEDICATIONS  (STANDING):  amLODIPine   Tablet 10 milliGRAM(s) Oral daily  atorvastatin 40 milliGRAM(s) Oral at bedtime  dextrose 5%. 1000 milliLiter(s) (50 mL/Hr) IV Continuous <Continuous>  dextrose 50% Injectable 12.5 Gram(s) IV Push once  dextrose 50% Injectable 25 Gram(s) IV Push once  dextrose 50% Injectable 25 Gram(s) IV Push once  enoxaparin Injectable 40 milliGRAM(s) SubCutaneous daily  fluconAZOLE   Tablet 200 milliGRAM(s) Oral daily  gabapentin 300 milliGRAM(s) Oral at bedtime  insulin glargine Injectable (LANTUS) 15 Unit(s) SubCutaneous at bedtime  insulin lispro (HumaLOG) corrective regimen sliding scale   SubCutaneous three times a day before meals  insulin lispro (HumaLOG) corrective regimen sliding scale   SubCutaneous at bedtime  insulin lispro Injectable (HumaLOG) 7 Unit(s) SubCutaneous three times a day before meals  lacosamide 150 milliGRAM(s) Oral two times a day  multivitamin/minerals 1 Tablet(s) Oral daily  nystatin    Suspension [id number] Unit(s) Oral four times a day  pantoprazole    Tablet 40 milliGRAM(s) Oral before breakfast  phenytoin   Capsule 300 milliGRAM(s) Oral at bedtime  predniSONE   Tablet 15 milliGRAM(s) Oral daily    MEDICATIONS  (PRN):  acetaminophen   Tablet .. 650 milliGRAM(s) Oral every 6 hours PRN Temp greater or equal to 38C (100.4F), Mild Pain (1 - 3), Moderate Pain (4 - 6)  ALBUTerol    90 MICROgram(s) HFA Inhaler 1 Puff(s) Inhalation every 4 hours PRN Shortness of Breath and/or Wheezing  dextrose 40% Gel 15 Gram(s) Oral once PRN Blood Glucose LESS THAN 70 milliGRAM(s)/deciliter  glucagon  Injectable 1 milliGRAM(s) IntraMuscular once PRN Glucose LESS THAN 70 milligrams/deciliter  ondansetron Injectable 4 milliGRAM(s) IV Push every 6 hours PRN Nausea and/or Vomiting      Allergies    No Known Allergies    Intolerances        REVIEW OF SYSTEMS:  Negative unless otherwise specified above.    Vital Signs Last 24 Hrs  T(C): 37.2 (23 Apr 2020 08:45), Max: 37.6 (23 Apr 2020 04:32)  T(F): 98.9 (23 Apr 2020 08:45), Max: 99.7 (23 Apr 2020 04:32)  HR: 115 (23 Apr 2020 08:45) (82 - 115)  BP: 127/87 (23 Apr 2020 08:45) (109/70 - 130/85)  BP(mean): --  RR: 20 (23 Apr 2020 08:45) (20 - 25)  SpO2: 95% (23 Apr 2020 08:45) (94% - 98%)        PHYSICAL EXAM:  GENERAL: No apparent distress, appears stated age  HEAD:  Atraumatic, Normocephalic  EYES: Conjunctiva and sclera clear, no discharge  ENMT: Moist mucous membranes, no nasal discharge  NECK: Supple, no JVD  CHEST/LUNG: Normal respirations  HEART: Regular rhythm  ABDOMEN: Soft, Nontender, Nondistended; Bowel sounds present  EXTREMITIES:  No clubbing, cyanosis or edema  SKIN: No rash or new discoloration  NERVOUS SYSTEM:  Alert & Oriented; Bilateral Lower extremity mobile, sensation to light touch intact      LABS:      Ca    9.1        22 Apr 2020 05:52          CAPILLARY BLOOD GLUCOSE      POCT Blood Glucose.: 218 mg/dL (23 Apr 2020 11:39)  POCT Blood Glucose.: 185 mg/dL (23 Apr 2020 08:20)  POCT Blood Glucose.: 148 mg/dL (23 Apr 2020 00:32)  POCT Blood Glucose.: 89 mg/dL (22 Apr 2020 21:06)  POCT Blood Glucose.: 197 mg/dL (22 Apr 2020 17:03)      RADIOLOGY & ADDITIONAL TESTS:    Images reviewed personally    Consultant Notes Reviewed and Care Discussed with relevant Consultants/Other Providers.

## 2020-04-23 NOTE — PROGRESS NOTE ADULT - PROBLEM SELECTOR PLAN 1
- continue Lantus 15 units QHS   - decrease Humalog to 7 units TID AC  - humalog moderate dose sliding scale tidac and qhs   - If Prednisone 15 mg dose is changed, please notify our team as she will need less insulin doses as well    Outpatient plan  - C-peptide 1.0, will discharge on insulin. A1c is 14%, glucose was in 200-300s even when she was taking her lantus 37 and humalog 20 units tidac at home but with hypoglycemic events depending on what she eats. Diet is carb heavy.   - F/u Endocrinology with [facility] at [address] [facility], call [phone] for apt - continue Lantus 15 units QHS   - decrease Humalog to 8/7/7 units TID AC  - humalog moderate dose sliding scale tidac and qhs   - If Prednisone 15 mg dose is changed, please notify our team as she will need less insulin doses as well    Outpatient plan  - C-peptide 1.0, will discharge on insulin. A1c is 14%, glucose was in 200-300s even when she was taking her lantus 37 and humalog 20 units tidac at home but with hypoglycemic events depending on what she eats. Diet is carb heavy.   - F/u Endocrinology with [facility] at [address] [facility], call [phone] for apt

## 2020-04-24 LAB
ALBUMIN SERPL ELPH-MCNC: 3.1 G/DL — LOW (ref 3.3–5)
ALP SERPL-CCNC: 70 U/L — SIGNIFICANT CHANGE UP (ref 40–120)
ALT FLD-CCNC: 11 U/L — SIGNIFICANT CHANGE UP (ref 10–45)
ANION GAP SERPL CALC-SCNC: 13 MMOL/L — SIGNIFICANT CHANGE UP (ref 5–17)
AST SERPL-CCNC: 16 U/L — SIGNIFICANT CHANGE UP (ref 10–40)
BASOPHILS # BLD AUTO: 0.03 K/UL — SIGNIFICANT CHANGE UP (ref 0–0.2)
BASOPHILS NFR BLD AUTO: 0.4 % — SIGNIFICANT CHANGE UP (ref 0–2)
BILIRUB SERPL-MCNC: 0.1 MG/DL — LOW (ref 0.2–1.2)
BUN SERPL-MCNC: 17 MG/DL — SIGNIFICANT CHANGE UP (ref 7–23)
CALCIUM SERPL-MCNC: 9.2 MG/DL — SIGNIFICANT CHANGE UP (ref 8.4–10.5)
CHLORIDE SERPL-SCNC: 98 MMOL/L — SIGNIFICANT CHANGE UP (ref 96–108)
CO2 SERPL-SCNC: 25 MMOL/L — SIGNIFICANT CHANGE UP (ref 22–31)
CREAT SERPL-MCNC: 1.18 MG/DL — SIGNIFICANT CHANGE UP (ref 0.5–1.3)
D DIMER BLD IA.RAPID-MCNC: 663 NG/ML DDU — HIGH
DESMETHYL LACOSAMIDE: <0.5 UG/ML — SIGNIFICANT CHANGE UP
EOSINOPHIL # BLD AUTO: 0.19 K/UL — SIGNIFICANT CHANGE UP (ref 0–0.5)
EOSINOPHIL NFR BLD AUTO: 2.8 % — SIGNIFICANT CHANGE UP (ref 0–6)
FERRITIN SERPL-MCNC: 353 NG/ML — HIGH (ref 15–150)
GAD65 AB SER-MCNC: 0 NMOL/L — SIGNIFICANT CHANGE UP
GLUCOSE BLDC GLUCOMTR-MCNC: 158 MG/DL — HIGH (ref 70–99)
GLUCOSE BLDC GLUCOMTR-MCNC: 180 MG/DL — HIGH (ref 70–99)
GLUCOSE BLDC GLUCOMTR-MCNC: 338 MG/DL — HIGH (ref 70–99)
GLUCOSE BLDC GLUCOMTR-MCNC: 338 MG/DL — HIGH (ref 70–99)
GLUCOSE SERPL-MCNC: 329 MG/DL — HIGH (ref 70–99)
HCT VFR BLD CALC: 32.4 % — LOW (ref 34.5–45)
HGB BLD-MCNC: 10 G/DL — LOW (ref 11.5–15.5)
IMM GRANULOCYTES NFR BLD AUTO: 4.7 % — HIGH (ref 0–1.5)
LACOSAMIDE (VIMPAT) RESULT: 6.7 UG/ML — SIGNIFICANT CHANGE UP (ref 1–10)
LYMPHOCYTES # BLD AUTO: 1.25 K/UL — SIGNIFICANT CHANGE UP (ref 1–3.3)
LYMPHOCYTES # BLD AUTO: 18.2 % — SIGNIFICANT CHANGE UP (ref 13–44)
MCHC RBC-ENTMCNC: 27 PG — SIGNIFICANT CHANGE UP (ref 27–34)
MCHC RBC-ENTMCNC: 30.9 GM/DL — LOW (ref 32–36)
MCV RBC AUTO: 87.3 FL — SIGNIFICANT CHANGE UP (ref 80–100)
MONOCYTES # BLD AUTO: 0.52 K/UL — SIGNIFICANT CHANGE UP (ref 0–0.9)
MONOCYTES NFR BLD AUTO: 7.6 % — SIGNIFICANT CHANGE UP (ref 2–14)
NEUTROPHILS # BLD AUTO: 4.54 K/UL — SIGNIFICANT CHANGE UP (ref 1.8–7.4)
NEUTROPHILS NFR BLD AUTO: 66.3 % — SIGNIFICANT CHANGE UP (ref 43–77)
NRBC # BLD: 0 /100 WBCS — SIGNIFICANT CHANGE UP (ref 0–0)
PLATELET # BLD AUTO: 434 K/UL — HIGH (ref 150–400)
POTASSIUM SERPL-MCNC: 5.6 MMOL/L — HIGH (ref 3.5–5.3)
POTASSIUM SERPL-SCNC: 5.6 MMOL/L — HIGH (ref 3.5–5.3)
PROT SERPL-MCNC: 7.6 G/DL — SIGNIFICANT CHANGE UP (ref 6–8.3)
RBC # BLD: 3.71 M/UL — LOW (ref 3.8–5.2)
RBC # FLD: 11.8 % — SIGNIFICANT CHANGE UP (ref 10.3–14.5)
SODIUM SERPL-SCNC: 136 MMOL/L — SIGNIFICANT CHANGE UP (ref 135–145)
WBC # BLD: 6.85 K/UL — SIGNIFICANT CHANGE UP (ref 3.8–10.5)
WBC # FLD AUTO: 6.85 K/UL — SIGNIFICANT CHANGE UP (ref 3.8–10.5)

## 2020-04-24 PROCEDURE — 99233 SBSQ HOSP IP/OBS HIGH 50: CPT

## 2020-04-24 PROCEDURE — 99232 SBSQ HOSP IP/OBS MODERATE 35: CPT | Mod: GC

## 2020-04-24 RX ORDER — SODIUM ZIRCONIUM CYCLOSILICATE 10 G/10G
10 POWDER, FOR SUSPENSION ORAL ONCE
Refills: 0 | Status: COMPLETED | OUTPATIENT
Start: 2020-04-24 | End: 2020-04-24

## 2020-04-24 RX ADMIN — Medication 7 UNIT(S): at 12:15

## 2020-04-24 RX ADMIN — Medication 300 MILLIGRAM(S): at 22:42

## 2020-04-24 RX ADMIN — Medication 8: at 08:00

## 2020-04-24 RX ADMIN — SODIUM ZIRCONIUM CYCLOSILICATE 10 GRAM(S): 10 POWDER, FOR SUSPENSION ORAL at 10:53

## 2020-04-24 RX ADMIN — Medication 15 MILLIGRAM(S): at 05:13

## 2020-04-24 RX ADMIN — GABAPENTIN 300 MILLIGRAM(S): 400 CAPSULE ORAL at 22:42

## 2020-04-24 RX ADMIN — INSULIN GLARGINE 15 UNIT(S): 100 INJECTION, SOLUTION SUBCUTANEOUS at 22:25

## 2020-04-24 RX ADMIN — Medication 1 TABLET(S): at 12:15

## 2020-04-24 RX ADMIN — LACOSAMIDE 150 MILLIGRAM(S): 50 TABLET ORAL at 05:12

## 2020-04-24 RX ADMIN — ATORVASTATIN CALCIUM 40 MILLIGRAM(S): 80 TABLET, FILM COATED ORAL at 22:42

## 2020-04-24 RX ADMIN — AMLODIPINE BESYLATE 10 MILLIGRAM(S): 2.5 TABLET ORAL at 05:13

## 2020-04-24 RX ADMIN — FLUCONAZOLE 200 MILLIGRAM(S): 150 TABLET ORAL at 12:14

## 2020-04-24 RX ADMIN — ENOXAPARIN SODIUM 40 MILLIGRAM(S): 100 INJECTION SUBCUTANEOUS at 10:53

## 2020-04-24 RX ADMIN — Medication 8 UNIT(S): at 08:00

## 2020-04-24 RX ADMIN — Medication 8: at 12:15

## 2020-04-24 RX ADMIN — LACOSAMIDE 150 MILLIGRAM(S): 50 TABLET ORAL at 17:36

## 2020-04-24 RX ADMIN — PANTOPRAZOLE SODIUM 40 MILLIGRAM(S): 20 TABLET, DELAYED RELEASE ORAL at 05:13

## 2020-04-24 RX ADMIN — Medication 2: at 17:36

## 2020-04-24 RX ADMIN — Medication 7 UNIT(S): at 17:36

## 2020-04-24 NOTE — PROGRESS NOTE ADULT - SUBJECTIVE AND OBJECTIVE BOX
Patient is a 49y old  Female who presents with a chief complaint of COVID pneumonia (23 Apr 2020 14:43)      INTERVAL History of Present Illness/OVERNIGHT EVENTS: stable respiratory status.  staff report dietary non-compliance with [employer] food for dinner last night etc.  Patient counseled on importance of dietary compliance    MEDICATIONS  (STANDING):  amLODIPine   Tablet 10 milliGRAM(s) Oral daily  atorvastatin 40 milliGRAM(s) Oral at bedtime  dextrose 5%. 1000 milliLiter(s) (50 mL/Hr) IV Continuous <Continuous>  dextrose 50% Injectable 12.5 Gram(s) IV Push once  dextrose 50% Injectable 25 Gram(s) IV Push once  dextrose 50% Injectable 25 Gram(s) IV Push once  enoxaparin Injectable 40 milliGRAM(s) SubCutaneous daily  fluconAZOLE   Tablet 200 milliGRAM(s) Oral daily  gabapentin 300 milliGRAM(s) Oral at bedtime  insulin glargine Injectable (LANTUS) 15 Unit(s) SubCutaneous at bedtime  insulin lispro (HumaLOG) corrective regimen sliding scale   SubCutaneous three times a day before meals  insulin lispro (HumaLOG) corrective regimen sliding scale   SubCutaneous at bedtime  insulin lispro Injectable (HumaLOG) 8 Unit(s) SubCutaneous before breakfast  insulin lispro Injectable (HumaLOG) 7 Unit(s) SubCutaneous before lunch  insulin lispro Injectable (HumaLOG) 7 Unit(s) SubCutaneous before dinner  lacosamide 150 milliGRAM(s) Oral two times a day  multivitamin/minerals 1 Tablet(s) Oral daily  nystatin    Suspension 181988 Unit(s) Oral four times a day  pantoprazole    Tablet 40 milliGRAM(s) Oral before breakfast  phenytoin   Capsule 300 milliGRAM(s) Oral at bedtime  predniSONE   Tablet 15 milliGRAM(s) Oral daily    MEDICATIONS  (PRN):  acetaminophen   Tablet .. 650 milliGRAM(s) Oral every 6 hours PRN Temp greater or equal to 38C (100.4F), Mild Pain (1 - 3), Moderate Pain (4 - 6)  ALBUTerol    90 MICROgram(s) HFA Inhaler 1 Puff(s) Inhalation every 4 hours PRN Shortness of Breath and/or Wheezing  dextrose 40% Gel 15 Gram(s) Oral once PRN Blood Glucose LESS THAN 70 milliGRAM(s)/deciliter  glucagon  Injectable 1 milliGRAM(s) IntraMuscular once PRN Glucose LESS THAN 70 milligrams/deciliter  ondansetron Injectable 4 milliGRAM(s) IV Push every 6 hours PRN Nausea and/or Vomiting      Allergies    No Known Allergies    Intolerances        REVIEW OF SYSTEMS:  Negative unless otherwise specified above.    Vital Signs Last 24 Hrs  T(C): 37.2 (24 Apr 2020 08:21), Max: 37.2 (24 Apr 2020 08:21)  T(F): 99 (24 Apr 2020 08:21), Max: 99 (24 Apr 2020 08:21)  HR: 109 (24 Apr 2020 08:21) (99 - 109)  BP: 137/88 (24 Apr 2020 08:21) (122/86 - 137/88)  BP(mean): --  RR: 20 (24 Apr 2020 08:21) (20 - 20)  SpO2: 96% (24 Apr 2020 08:21) (96% - 98%)        PHYSICAL EXAM:  GENERAL: No apparent distress, appears stated age  HEAD:  Atraumatic, Normocephalic  EYES: Conjunctiva and sclera clear, no discharge  ENMT: Moist mucous membranes, no nasal discharge  NECK: Supple, no JVD  CHEST/LUNG: Normal respiratory effort  HEART: Regular rhythm  ABDOMEN: Nondistended  EXTREMITIES:  No clubbing, cyanosis or edema  SKIN: No rash or new discoloration  NERVOUS SYSTEM:  Alert & Oriented; Bilateral Lower extremity mobile, sensation to light touch intact      LABS:                        10.0   6.85  )-----------( 434      ( 24 Apr 2020 07:09 )             32.4     24 Apr 2020 07:08    136    |  98     |  17     ----------------------------<  329    5.6     |  25     |  1.18     Ca    9.2        24 Apr 2020 07:08    TPro  7.6    /  Alb  3.1    /  TBili  0.1    /  DBili  x      /  AST  16     /  ALT  11     /  AlkPhos  70     24 Apr 2020 07:08        CAPILLARY BLOOD GLUCOSE      POCT Blood Glucose.: 338 mg/dL (24 Apr 2020 12:09)  POCT Blood Glucose.: 338 mg/dL (24 Apr 2020 07:46)  POCT Blood Glucose.: 192 mg/dL (23 Apr 2020 21:03)  POCT Blood Glucose.: 171 mg/dL (23 Apr 2020 17:00)      RADIOLOGY & ADDITIONAL TESTS:    Images reviewed personally    Consultant Notes Reviewed and Care Discussed with relevant Consultants/Other Providers.

## 2020-04-24 NOTE — PROGRESS NOTE ADULT - ASSESSMENT
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

## 2020-04-24 NOTE — PROGRESS NOTE ADULT - PROBLEM SELECTOR PLAN 1
- increase Lantus to 20 units QHS   - increase Humalog to 10 units TID AC  - humalog moderate dose sliding scale tidac and qhs   - If Prednisone 15 mg dose is changed, please notify our team as she will need less insulin doses as well    Outpatient plan  - C-peptide 1.0, will discharge on insulin. A1c is 14%, glucose was in 200-300s even when she was taking her lantus 37 and humalog 20 units tidac at home but with hypoglycemic events depending on what she eats. Diet is carb heavy.   - F/u Endocrinology with [facility] at [address] [facility], call [phone] for apt

## 2020-04-24 NOTE — PROGRESS NOTE ADULT - OTHER
[address] [facility]
[address] [facility]
[address] [facility].

## 2020-04-24 NOTE — PROGRESS NOTE ADULT - PROBLEM SELECTOR PLAN 1
-Plaquenil (4/18-4/22), QTc 423 (4/18)  -titrate supp O2 to maintain sat 92-96%, currently on 4L NC; wean as tolerated.     STABLE CLINICAL STATUS

## 2020-04-24 NOTE — PROGRESS NOTE ADULT - PROBLEM SELECTOR PLAN 3
more Camrynkelma today.  patient ate banana 4/23 - advised to avoid given diabetes and hyperkalemia.  very poor dietary and lifestyle choices

## 2020-04-24 NOTE — PROGRESS NOTE ADULT - SUBJECTIVE AND OBJECTIVE BOX
Chief Complaint: covid    History: Spoke with patient [phone]. Had half a mcdonalds sandwich at bedtime last night. Feels well generally with no complaints. Has good appetite.     MEDICATIONS  (STANDING):  amLODIPine   Tablet 10 milliGRAM(s) Oral daily  atorvastatin 40 milliGRAM(s) Oral at bedtime  dextrose 5%. 1000 milliLiter(s) (50 mL/Hr) IV Continuous <Continuous>  dextrose 50% Injectable 12.5 Gram(s) IV Push once  dextrose 50% Injectable 25 Gram(s) IV Push once  dextrose 50% Injectable 25 Gram(s) IV Push once  enoxaparin Injectable 40 milliGRAM(s) SubCutaneous daily  fluconAZOLE   Tablet 200 milliGRAM(s) Oral daily  gabapentin 300 milliGRAM(s) Oral at bedtime  insulin glargine Injectable (LANTUS) 15 Unit(s) SubCutaneous at bedtime  insulin lispro (HumaLOG) corrective regimen sliding scale   SubCutaneous three times a day before meals  insulin lispro (HumaLOG) corrective regimen sliding scale   SubCutaneous at bedtime  insulin lispro Injectable (HumaLOG) 8 Unit(s) SubCutaneous before breakfast  insulin lispro Injectable (HumaLOG) 7 Unit(s) SubCutaneous before lunch  insulin lispro Injectable (HumaLOG) 7 Unit(s) SubCutaneous before dinner  lacosamide 150 milliGRAM(s) Oral two times a day  multivitamin/minerals 1 Tablet(s) Oral daily  nystatin    Suspension 716807 Unit(s) Oral four times a day  pantoprazole    Tablet 40 milliGRAM(s) Oral before breakfast  phenytoin   Capsule 300 milliGRAM(s) Oral at bedtime  predniSONE   Tablet 15 milliGRAM(s) Oral daily    MEDICATIONS  (PRN):  acetaminophen   Tablet .. 650 milliGRAM(s) Oral every 6 hours PRN Temp greater or equal to 38C (100.4F), Mild Pain (1 - 3), Moderate Pain (4 - 6)  ALBUTerol    90 MICROgram(s) HFA Inhaler 1 Puff(s) Inhalation every 4 hours PRN Shortness of Breath and/or Wheezing  dextrose 40% Gel 15 Gram(s) Oral once PRN Blood Glucose LESS THAN 70 milliGRAM(s)/deciliter  glucagon  Injectable 1 milliGRAM(s) IntraMuscular once PRN Glucose LESS THAN 70 milligrams/deciliter  ondansetron Injectable 4 milliGRAM(s) IV Push every 6 hours PRN Nausea and/or Vomiting      Allergies    No Known Allergies    Intolerances    ROS: All other systems reviewed and negative    PHYSICAL EXAM:  VITALS: T(C): 37.2 (04-24-20 @ 08:21)  T(F): 99 (04-24-20 @ 08:21), Max: 99 (04-24-20 @ 08:21)  HR: 109 (04-24-20 @ 08:21) (99 - 109)  BP: 137/88 (04-24-20 @ 08:21) (122/86 - 137/88)  RR:  (20 - 20)  SpO2:  (96% - 98%)  Wt(kg): --    POCT Blood Glucose.: 338 mg/dL (04-24-20 @ 12:09)H8+8  POCT Blood Glucose.: 338 mg/dL (04-24-20 @ 07:46)H8+7 Prednisone 15 mg   POCT Blood Glucose.: 192 mg/dL (04-23-20 @ 21:03)L15  POCT Blood Glucose.: 171 mg/dL (04-23-20 @ 17:00)H7+2  POCT Blood Glucose.: 218 mg/dL (04-23-20 @ 11:39)H7+4    POCT Blood Glucose.: 185 mg/dL (04-23-20 @ 08:20)H8+2 Pred 15  POCT Blood Glucose.: 148 mg/dL (04-23-20 @ 00:32)  POCT Blood Glucose.: 89 mg/dL (04-22-20 @ 21:06)L15  POCT Blood Glucose.: 197 mg/dL (04-22-20 @ 17:03)H8+2  POCT Blood Glucose.: 392 mg/dL (04-22-20 @ 11:47)H3+5  POCT Blood Glucose.: 290 mg/dL (04-22-20 @ 08:02)H3+3 Prednisone 15 mg   POCT Blood Glucose.: 275 mg/dL (04-22-20 @ 00:23)  POCT Blood Glucose.: 71 mg/dL (04-21-20 @ 20:37)L10  POCT Blood Glucose.: 218 mg/dL (04-21-20 @ 16:41)H3+2    POCT Blood Glucose.: 126 mg/dL (04-21-20 @ 12:01) Hum 3  POCT Blood Glucose.: 132 mg/dL (04-21-20 @ 08:24) Hum 3    POCT Blood Glucose.: 97 mg/dL (04-20-20 @ 20:58) Lantus 10  POCT Blood Glucose.: 249 mg/dL (04-20-20 @ 16:59) Hum 3+2  POCT Blood Glucose.: 179 mg/dL (04-20-20 @ 13:04) Hum 3+1  POCT Blood Glucose.: 196 mg/dL (04-20-20 @ 09:14) Hum 8+1  POCT Blood Glucose.: 107 mg/dL (04-20-20 @ 05:46) Prednisone 15    04-24    136  |  98  |  17  ----------------------------<  329<H>  5.6<H>   |  25  |  1.18    EGFR if : 64  EGFR if non : 55<L>    Ca    9.2      04-24    TPro  7.6  /  Alb  3.1<L>  /  TBili  0.1<L>  /  DBili  x   /  AST  16  /  ALT  11  /  AlkPhos  70  04-24          Thyroid Function Tests: Chief Complaint: covid    History: Spoke with patient [phone]. Had half a mcdonalds sandwich at bedtime last night. Feels well generally with no complaints. Has good appetite.     MEDICATIONS  (STANDING):  amLODIPine   Tablet 10 milliGRAM(s) Oral daily  atorvastatin 40 milliGRAM(s) Oral at bedtime  dextrose 5%. 1000 milliLiter(s) (50 mL/Hr) IV Continuous <Continuous>  dextrose 50% Injectable 12.5 Gram(s) IV Push once  dextrose 50% Injectable 25 Gram(s) IV Push once  dextrose 50% Injectable 25 Gram(s) IV Push once  enoxaparin Injectable 40 milliGRAM(s) SubCutaneous daily  fluconAZOLE   Tablet 200 milliGRAM(s) Oral daily  gabapentin 300 milliGRAM(s) Oral at bedtime  insulin glargine Injectable (LANTUS) 15 Unit(s) SubCutaneous at bedtime  insulin lispro (HumaLOG) corrective regimen sliding scale   SubCutaneous three times a day before meals  insulin lispro (HumaLOG) corrective regimen sliding scale   SubCutaneous at bedtime  insulin lispro Injectable (HumaLOG) 8 Unit(s) SubCutaneous before breakfast  insulin lispro Injectable (HumaLOG) 7 Unit(s) SubCutaneous before lunch  insulin lispro Injectable (HumaLOG) 7 Unit(s) SubCutaneous before dinner  lacosamide 150 milliGRAM(s) Oral two times a day  multivitamin/minerals 1 Tablet(s) Oral daily  nystatin    Suspension [id number] Unit(s) Oral four times a day  pantoprazole    Tablet 40 milliGRAM(s) Oral before breakfast  phenytoin   Capsule 300 milliGRAM(s) Oral at bedtime  predniSONE   Tablet 15 milliGRAM(s) Oral daily    MEDICATIONS  (PRN):  acetaminophen   Tablet .. 650 milliGRAM(s) Oral every 6 hours PRN Temp greater or equal to 38C (100.4F), Mild Pain (1 - 3), Moderate Pain (4 - 6)  ALBUTerol    90 MICROgram(s) HFA Inhaler 1 Puff(s) Inhalation every 4 hours PRN Shortness of Breath and/or Wheezing  dextrose 40% Gel 15 Gram(s) Oral once PRN Blood Glucose LESS THAN 70 milliGRAM(s)/deciliter  glucagon  Injectable 1 milliGRAM(s) IntraMuscular once PRN Glucose LESS THAN 70 milligrams/deciliter  ondansetron Injectable 4 milliGRAM(s) IV Push every 6 hours PRN Nausea and/or Vomiting      Allergies    No Known Allergies    Intolerances    ROS: All other systems reviewed and negative    PHYSICAL EXAM:  VITALS: T(C): 37.2 (04-24-20 @ 08:21)  T(F): 99 (04-24-20 @ 08:21), Max: 99 (04-24-20 @ 08:21)  HR: 109 (04-24-20 @ 08:21) (99 - 109)  BP: 137/88 (04-24-20 @ 08:21) (122/86 - 137/88)  RR:  (20 - 20)  SpO2:  (96% - 98%)  Wt(kg): --  Deferred. Please refer to IM PE    POCT Blood Glucose.: 338 mg/dL (04-24-20 @ 12:09)H8+8  POCT Blood Glucose.: 338 mg/dL (04-24-20 @ 07:46)H8+7 Prednisone 15 mg   POCT Blood Glucose.: 192 mg/dL (04-23-20 @ 21:03)L15  POCT Blood Glucose.: 171 mg/dL (04-23-20 @ 17:00)H7+2  POCT Blood Glucose.: 218 mg/dL (04-23-20 @ 11:39)H7+4    POCT Blood Glucose.: 185 mg/dL (04-23-20 @ 08:20)H8+2 Pred 15  POCT Blood Glucose.: 148 mg/dL (04-23-20 @ 00:32)  POCT Blood Glucose.: 89 mg/dL (04-22-20 @ 21:06)L15  POCT Blood Glucose.: 197 mg/dL (04-22-20 @ 17:03)H8+2  POCT Blood Glucose.: 392 mg/dL (04-22-20 @ 11:47)H3+5  POCT Blood Glucose.: 290 mg/dL (04-22-20 @ 08:02)H3+3 Prednisone 15 mg   POCT Blood Glucose.: 275 mg/dL (04-22-20 @ 00:23)  POCT Blood Glucose.: 71 mg/dL (04-21-20 @ 20:37)L10  POCT Blood Glucose.: 218 mg/dL (04-21-20 @ 16:41)H3+2    POCT Blood Glucose.: 126 mg/dL (04-21-20 @ 12:01) Hum 3  POCT Blood Glucose.: 132 mg/dL (04-21-20 @ 08:24) Hum 3    POCT Blood Glucose.: 97 mg/dL (04-20-20 @ 20:58) Lantus 10  POCT Blood Glucose.: 249 mg/dL (04-20-20 @ 16:59) Hum 3+2  POCT Blood Glucose.: 179 mg/dL (04-20-20 @ 13:04) Hum 3+1  POCT Blood Glucose.: 196 mg/dL (04-20-20 @ 09:14) Hum 8+1  POCT Blood Glucose.: 107 mg/dL (04-20-20 @ 05:46) Prednisone 15    04-24    136  |  98  |  17  ----------------------------<  329<H>  5.6<H>   |  25  |  1.18    EGFR if : 64  EGFR if non : 55<L>    Ca    9.2      04-24    TPro  7.6  /  Alb  3.1<L>  /  TBili  0.1<L>  /  DBili  x   /  AST  16  /  ALT  11  /  AlkPhos  70  04-24          Thyroid Function Tests:

## 2020-04-25 LAB
ANION GAP SERPL CALC-SCNC: 13 MMOL/L — SIGNIFICANT CHANGE UP (ref 5–17)
BUN SERPL-MCNC: 19 MG/DL — SIGNIFICANT CHANGE UP (ref 7–23)
CALCIUM SERPL-MCNC: 9.4 MG/DL — SIGNIFICANT CHANGE UP (ref 8.4–10.5)
CHLORIDE SERPL-SCNC: 100 MMOL/L — SIGNIFICANT CHANGE UP (ref 96–108)
CO2 SERPL-SCNC: 23 MMOL/L — SIGNIFICANT CHANGE UP (ref 22–31)
CREAT SERPL-MCNC: 1.15 MG/DL — SIGNIFICANT CHANGE UP (ref 0.5–1.3)
GLUCOSE BLDC GLUCOMTR-MCNC: 108 MG/DL — HIGH (ref 70–99)
GLUCOSE BLDC GLUCOMTR-MCNC: 139 MG/DL — HIGH (ref 70–99)
GLUCOSE BLDC GLUCOMTR-MCNC: 142 MG/DL — HIGH (ref 70–99)
GLUCOSE BLDC GLUCOMTR-MCNC: 220 MG/DL — HIGH (ref 70–99)
GLUCOSE BLDC GLUCOMTR-MCNC: 298 MG/DL — HIGH (ref 70–99)
GLUCOSE SERPL-MCNC: 304 MG/DL — HIGH (ref 70–99)
HCT VFR BLD CALC: 32.2 % — LOW (ref 34.5–45)
HGB BLD-MCNC: 10 G/DL — LOW (ref 11.5–15.5)
ISLET CELL512 AB SER-ACNC: SIGNIFICANT CHANGE UP
MCHC RBC-ENTMCNC: 26.7 PG — LOW (ref 27–34)
MCHC RBC-ENTMCNC: 31.1 GM/DL — LOW (ref 32–36)
MCV RBC AUTO: 86.1 FL — SIGNIFICANT CHANGE UP (ref 80–100)
NRBC # BLD: 0 /100 WBCS — SIGNIFICANT CHANGE UP (ref 0–0)
PLATELET # BLD AUTO: 457 K/UL — HIGH (ref 150–400)
POTASSIUM SERPL-MCNC: 5.1 MMOL/L — SIGNIFICANT CHANGE UP (ref 3.5–5.3)
POTASSIUM SERPL-SCNC: 5.1 MMOL/L — SIGNIFICANT CHANGE UP (ref 3.5–5.3)
RBC # BLD: 3.74 M/UL — LOW (ref 3.8–5.2)
RBC # FLD: 11.9 % — SIGNIFICANT CHANGE UP (ref 10.3–14.5)
SODIUM SERPL-SCNC: 136 MMOL/L — SIGNIFICANT CHANGE UP (ref 135–145)
WBC # BLD: 8.74 K/UL — SIGNIFICANT CHANGE UP (ref 3.8–10.5)
WBC # FLD AUTO: 8.74 K/UL — SIGNIFICANT CHANGE UP (ref 3.8–10.5)

## 2020-04-25 PROCEDURE — 99442: CPT

## 2020-04-25 PROCEDURE — 99232 SBSQ HOSP IP/OBS MODERATE 35: CPT

## 2020-04-25 RX ORDER — INSULIN GLARGINE 100 [IU]/ML
18 INJECTION, SOLUTION SUBCUTANEOUS AT BEDTIME
Refills: 0 | Status: DISCONTINUED | OUTPATIENT
Start: 2020-04-25 | End: 2020-04-27

## 2020-04-25 RX ORDER — INSULIN LISPRO 100/ML
12 VIAL (ML) SUBCUTANEOUS
Refills: 0 | Status: DISCONTINUED | OUTPATIENT
Start: 2020-04-26 | End: 2020-04-27

## 2020-04-25 RX ADMIN — Medication 7 UNIT(S): at 12:06

## 2020-04-25 RX ADMIN — Medication 4: at 08:22

## 2020-04-25 RX ADMIN — LACOSAMIDE 150 MILLIGRAM(S): 50 TABLET ORAL at 05:41

## 2020-04-25 RX ADMIN — Medication 7 UNIT(S): at 17:32

## 2020-04-25 RX ADMIN — INSULIN GLARGINE 18 UNIT(S): 100 INJECTION, SOLUTION SUBCUTANEOUS at 23:36

## 2020-04-25 RX ADMIN — LACOSAMIDE 150 MILLIGRAM(S): 50 TABLET ORAL at 17:32

## 2020-04-25 RX ADMIN — Medication 6: at 12:06

## 2020-04-25 RX ADMIN — FLUCONAZOLE 200 MILLIGRAM(S): 150 TABLET ORAL at 12:09

## 2020-04-25 RX ADMIN — GABAPENTIN 300 MILLIGRAM(S): 400 CAPSULE ORAL at 23:35

## 2020-04-25 RX ADMIN — Medication 500000 UNIT(S): at 17:33

## 2020-04-25 RX ADMIN — Medication 300 MILLIGRAM(S): at 23:35

## 2020-04-25 RX ADMIN — ATORVASTATIN CALCIUM 40 MILLIGRAM(S): 80 TABLET, FILM COATED ORAL at 23:35

## 2020-04-25 RX ADMIN — Medication 1 TABLET(S): at 12:09

## 2020-04-25 RX ADMIN — Medication 15 MILLIGRAM(S): at 05:40

## 2020-04-25 RX ADMIN — Medication 500000 UNIT(S): at 12:09

## 2020-04-25 RX ADMIN — AMLODIPINE BESYLATE 10 MILLIGRAM(S): 2.5 TABLET ORAL at 05:41

## 2020-04-25 RX ADMIN — PANTOPRAZOLE SODIUM 40 MILLIGRAM(S): 20 TABLET, DELAYED RELEASE ORAL at 05:41

## 2020-04-25 RX ADMIN — ENOXAPARIN SODIUM 40 MILLIGRAM(S): 100 INJECTION SUBCUTANEOUS at 12:05

## 2020-04-25 RX ADMIN — Medication 8 UNIT(S): at 08:22

## 2020-04-25 NOTE — PROGRESS NOTE ADULT - ASSESSMENT
48 y/o female with uncontrolled T2DM (A1c 14%) with history of pulmonary and neurosarcoidosis and h/o seizure on steroids at home here with COVID-19 infection and HHS. On prednisone 15mg daily w/hyperglycemia. BG goal (100-180mg/dl). Tolerating POs w/improved PO intake.

## 2020-04-25 NOTE — PROGRESS NOTE ADULT - PROBLEM SELECTOR PLAN 3
-s/p more Lokelma yesterday. -K within normal today.   patient ate banana 4/23 - advised to avoid given diabetes and hyperkalemia.  very poor dietary and lifestyle choices. -RD f/u.   -Low K diet.

## 2020-04-25 NOTE — PROGRESS NOTE ADULT - PROBLEM SELECTOR PLAN 1
-test BG AC/HS  -Increase Lantus 18 units QHS  -Adjust Humalog 12-7-7 w/meals  -c/w Humalog moderate correction scale AC and Mod HS scale  - If Prednisone 15 mg dose is changed, please notify our team as she will need less insulin doses as well    Outpatient plan  - C-peptide 1.0, will discharge on insulin. A1c is 14%, glucose was in 200-300s even when she was taking her lantus 37 and humalog 20 units tidac at home but with hypoglycemic events depending on what she eats. Diet is carb heavy.   - F/u Endocrinology with [facility] at [address] [place] [address], call [phone] for apt    -I spent a total time of 18 mins with the patient via telephone of which more than 50% spent on coordination of care.

## 2020-04-25 NOTE — PROGRESS NOTE ADULT - SUBJECTIVE AND OBJECTIVE BOX
Diabetes Follow up note:    Chief complaint:     Interval Hx:    Review of Systems:  General:  GI: Tolerating POs. Denies N/V/D/Abd pain  CV: Denies CP/SOB  ENDO: No S&Sx of hypoglycemia  MEDS:  atorvastatin 40 milliGRAM(s) Oral at bedtime  dextrose 40% Gel 15 Gram(s) Oral once PRN  dextrose 50% Injectable 12.5 Gram(s) IV Push once  dextrose 50% Injectable 25 Gram(s) IV Push once  dextrose 50% Injectable 25 Gram(s) IV Push once  glucagon  Injectable 1 milliGRAM(s) IntraMuscular once PRN  insulin glargine Injectable (LANTUS) 15 Unit(s) SubCutaneous at bedtime  insulin lispro (HumaLOG) corrective regimen sliding scale   SubCutaneous three times a day before meals  insulin lispro (HumaLOG) corrective regimen sliding scale   SubCutaneous at bedtime  insulin lispro Injectable (HumaLOG) 8 Unit(s) SubCutaneous before breakfast  insulin lispro Injectable (HumaLOG) 7 Unit(s) SubCutaneous before lunch  insulin lispro Injectable (HumaLOG) 7 Unit(s) SubCutaneous before dinner  predniSONE   Tablet 15 milliGRAM(s) Oral daily    fluconAZOLE   Tablet 200 milliGRAM(s) Oral daily  nystatin    Suspension 614886 Unit(s) Oral four times a day    Allergies    No Known Allergies        PE:  General:  Vital Signs Last 24 Hrs  T(C): 36.7 (25 Apr 2020 09:20), Max: 37.5 (25 Apr 2020 05:04)  T(F): 98.1 (25 Apr 2020 09:20), Max: 99.5 (25 Apr 2020 05:04)  HR: 100 (25 Apr 2020 09:20) (98 - 102)  BP: 113/75 (25 Apr 2020 09:20) (110/75 - 128/84)  BP(mean): --  RR: 20 (25 Apr 2020 09:20) (20 - 21)  SpO2: 98% (25 Apr 2020 09:20) (96% - 100%)  Abd: Soft, NT,ND,   Extremities: Warm  Neuro: A&O X3    LABS:  POCT Blood Glucose.: 298 mg/dL (04-25-20 @ 11:53)  POCT Blood Glucose.: 220 mg/dL (04-25-20 @ 07:58)  POCT Blood Glucose.: 158 mg/dL (04-24-20 @ 21:36)  POCT Blood Glucose.: 180 mg/dL (04-24-20 @ 17:34)  POCT Blood Glucose.: 338 mg/dL (04-24-20 @ 12:09)  POCT Blood Glucose.: 338 mg/dL (04-24-20 @ 07:46)  POCT Blood Glucose.: 192 mg/dL (04-23-20 @ 21:03)  POCT Blood Glucose.: 171 mg/dL (04-23-20 @ 17:00)  POCT Blood Glucose.: 218 mg/dL (04-23-20 @ 11:39)  POCT Blood Glucose.: 185 mg/dL (04-23-20 @ 08:20)  POCT Blood Glucose.: 148 mg/dL (04-23-20 @ 00:32)  POCT Blood Glucose.: 89 mg/dL (04-22-20 @ 21:06)  POCT Blood Glucose.: 197 mg/dL (04-22-20 @ 17:03)                            10.0   8.74  )-----------( 457      ( 25 Apr 2020 10:46 )             32.2       04-25    136  |  100  |  19  ----------------------------<  304<H>  5.1   |  23  |  1.15    Ca    9.4      25 Apr 2020 10:46    TPro  7.6  /  Alb  3.1<L>  /  TBili  0.1<L>  /  DBili  x   /  AST  16  /  ALT  11  /  AlkPhos  70  04-24      Thyroid Function Tests:          C-Peptide, Serum: 1.0 ng/mL (04-21 @ 16:12)      Contact number: olinda [phone] or [phone] Diabetes Follow up note:    Chief complaint: T2DM    Interval Hx: Glucose values in 200s today. Pt reports eating an apple this AM prior to FS testing. Feels better, reports that she is on 2L NC today. Understands she needs to take her insulin at discharge.     Review of Systems:  General: + appetite improved  GI: Tolerating POs. Denies N/V/D/Abd pain  CV: Denies CP/SOB  ENDO: No S&Sx of hypoglycemia  MEDS:  atorvastatin 40 milliGRAM(s) Oral at bedtime    insulin glargine Injectable (LANTUS) 15 Unit(s) SubCutaneous at bedtime  insulin lispro (HumaLOG) corrective regimen sliding scale   SubCutaneous three times a day before meals  insulin lispro (HumaLOG) corrective regimen sliding scale   SubCutaneous at bedtime  insulin lispro Injectable (HumaLOG) 8 Unit(s) SubCutaneous before breakfast  insulin lispro Injectable (HumaLOG) 7 Unit(s) SubCutaneous before lunch  insulin lispro Injectable (HumaLOG) 7 Unit(s) SubCutaneous before dinner  predniSONE   Tablet 15 milliGRAM(s) Oral daily    fluconAZOLE   Tablet 200 milliGRAM(s) Oral daily  nystatin    Suspension 615996 Unit(s) Oral four times a day    Allergies    No Known Allergies        PHYSICAL EXAM: (4/24 internal medicine exam)  GENERAL: No apparent distress, appears stated age  HEAD:  Atraumatic, Normocephalic  EYES: Conjunctiva and sclera clear, no discharge  ENMT: Moist mucous membranes, no nasal discharge  NECK: Supple, no JVD  CHEST/LUNG: Normal respiratory effort  HEART: Regular rhythm  ABDOMEN: Nondistended  EXTREMITIES:  No clubbing, cyanosis or edema  SKIN: No rash or new discoloration  NERVOUS SYSTEM:  Alert & Oriented; Bilateral Lower extremity mobile, sensation to light touch intact    Vital Signs Last 24 Hrs  T(C): 36.7 (25 Apr 2020 09:20), Max: 37.5 (25 Apr 2020 05:04)  T(F): 98.1 (25 Apr 2020 09:20), Max: 99.5 (25 Apr 2020 05:04)  HR: 100 (25 Apr 2020 09:20) (98 - 102)  BP: 113/75 (25 Apr 2020 09:20) (110/75 - 128/84)  BP(mean): --  RR: 20 (25 Apr 2020 09:20) (20 - 21)  SpO2: 98% (25 Apr 2020 09:20) (96% - 100%)      LABS:  POCT Blood Glucose.: 298 mg/dL (04-25-20 @ 11:53)  POCT Blood Glucose.: 220 mg/dL (04-25-20 @ 07:58)  POCT Blood Glucose.: 158 mg/dL (04-24-20 @ 21:36)  POCT Blood Glucose.: 180 mg/dL (04-24-20 @ 17:34)  POCT Blood Glucose.: 338 mg/dL (04-24-20 @ 12:09)  POCT Blood Glucose.: 338 mg/dL (04-24-20 @ 07:46)  POCT Blood Glucose.: 192 mg/dL (04-23-20 @ 21:03)  POCT Blood Glucose.: 171 mg/dL (04-23-20 @ 17:00)  POCT Blood Glucose.: 218 mg/dL (04-23-20 @ 11:39)  POCT Blood Glucose.: 185 mg/dL (04-23-20 @ 08:20)  POCT Blood Glucose.: 148 mg/dL (04-23-20 @ 00:32)  POCT Blood Glucose.: 89 mg/dL (04-22-20 @ 21:06)  POCT Blood Glucose.: 197 mg/dL (04-22-20 @ 17:03)                            10.0   8.74  )-----------( 457      ( 25 Apr 2020 10:46 )             32.2       04-25    136  |  100  |  19  ----------------------------<  304<H>  5.1   |  23  |  1.15    Ca    9.4      25 Apr 2020 10:46    TPro  7.6  /  Alb  3.1<L>  /  TBili  0.1<L>  /  DBili  x   /  AST  16  /  ALT  11  /  AlkPhos  70  04-24            C-Peptide, Serum: 1.0 ng/mL (04-21 @ 16:12)      Contact number: olinda [phone] or [phone]

## 2020-04-25 NOTE — PROGRESS NOTE ADULT - NS ED BHA TELEPSYCH PATIENT LOCATION
[facility]

## 2020-04-25 NOTE — PROGRESS NOTE ADULT - SUBJECTIVE AND OBJECTIVE BOX
Patient is a 49y old  Female who presents with a chief complaint of covid (24 Apr 2020 14:35)        SUBJECTIVE / OVERNIGHT EVENTS: Patient reports that she's eating better. She says her mouth pain is a bit better. She says brushing her tongue helped. She doesn't report SOB.       MEDICATIONS  (STANDING):  amLODIPine   Tablet 10 milliGRAM(s) Oral daily  atorvastatin 40 milliGRAM(s) Oral at bedtime  dextrose 5%. 1000 milliLiter(s) (50 mL/Hr) IV Continuous <Continuous>  dextrose 50% Injectable 12.5 Gram(s) IV Push once  dextrose 50% Injectable 25 Gram(s) IV Push once  dextrose 50% Injectable 25 Gram(s) IV Push once  enoxaparin Injectable 40 milliGRAM(s) SubCutaneous daily  fluconAZOLE   Tablet 200 milliGRAM(s) Oral daily  gabapentin 300 milliGRAM(s) Oral at bedtime  insulin glargine Injectable (LANTUS) 18 Unit(s) SubCutaneous at bedtime  insulin lispro (HumaLOG) corrective regimen sliding scale   SubCutaneous three times a day before meals  insulin lispro (HumaLOG) corrective regimen sliding scale   SubCutaneous at bedtime  insulin lispro Injectable (HumaLOG) 7 Unit(s) SubCutaneous before lunch  insulin lispro Injectable (HumaLOG) 7 Unit(s) SubCutaneous before dinner  lacosamide 150 milliGRAM(s) Oral two times a day  multivitamin/minerals 1 Tablet(s) Oral daily  nystatin    Suspension [id number] Unit(s) Oral four times a day  pantoprazole    Tablet 40 milliGRAM(s) Oral before breakfast  phenytoin   Capsule 300 milliGRAM(s) Oral at bedtime  predniSONE   Tablet 15 milliGRAM(s) Oral daily    MEDICATIONS  (PRN):  acetaminophen   Tablet .. 650 milliGRAM(s) Oral every 6 hours PRN Temp greater or equal to 38C (100.4F), Mild Pain (1 - 3), Moderate Pain (4 - 6)  ALBUTerol    90 MICROgram(s) HFA Inhaler 1 Puff(s) Inhalation every 4 hours PRN Shortness of Breath and/or Wheezing  dextrose 40% Gel 15 Gram(s) Oral once PRN Blood Glucose LESS THAN 70 milliGRAM(s)/deciliter  glucagon  Injectable 1 milliGRAM(s) IntraMuscular once PRN Glucose LESS THAN 70 milligrams/deciliter  ondansetron Injectable 4 milliGRAM(s) IV Push every 6 hours PRN Nausea and/or Vomiting      Vital Signs Last 24 Hrs  T(C): 36.7 (25 Apr 2020 15:38), Max: 37.5 (25 Apr 2020 05:04)  T(F): 98.1 (25 Apr 2020 15:38), Max: 99.5 (25 Apr 2020 05:04)  HR: 103 (25 Apr 2020 15:38) (98 - 103)  BP: 130/85 (25 Apr 2020 15:38) (110/75 - 130/85)  BP(mean): --  RR: 20 (25 Apr 2020 15:38) (20 - 21)  SpO2: 99% (25 Apr 2020 15:38) (98% - 100%)  CAPILLARY BLOOD GLUCOSE      POCT Blood Glucose.: 142 mg/dL (25 Apr 2020 17:02)  POCT Blood Glucose.: 298 mg/dL (25 Apr 2020 11:53)  POCT Blood Glucose.: 220 mg/dL (25 Apr 2020 07:58)  POCT Blood Glucose.: 158 mg/dL (24 Apr 2020 21:36)    I&O's Summary    24 Apr 2020 07:01  -  25 Apr 2020 07:00  --------------------------------------------------------  IN: 1280 mL / OUT: 0 mL / NET: 1280 mL    25 Apr 2020 07:01  -  25 Apr 2020 18:17  --------------------------------------------------------  IN: 480 mL / OUT: 0 mL / NET: 480 mL          PHYSICAL EXAM:   GENERAL: NAD, well-developed  HEAD:  Atraumatic, Normocephalic; tongue with thrush.   EYES: Conjunctiva and sclera clear  NECK: Supple   CHEST/LUNG: Clear but distant; No wheeze  HEART: S1S2 normal. Regular rate and rhythm; No murmurs, rubs, or gallops  ABDOMEN: Soft, Nontender, Nondistended; Bowel sounds present  EXTREMITIES:   No clubbing, cyanosis, or edema  PSYCH/Neuro: AAOx3. Non-focal.          LABS:                        10.0   8.74  )-----------( 457      ( 25 Apr 2020 10:46 )             32.2     04-25    136  |  100  |  19  ----------------------------<  304<H>  5.1   |  23  |  1.15    Ca    9.4      25 Apr 2020 10:46    TPro  7.6  /  Alb  3.1<L>  /  TBili  0.1<L>  /  DBili  x   /  AST  16  /  ALT  11  /  AlkPhos  70  04-24          RADIOLOGY & ADDITIONAL TESTS:    Imaging Personally Reviewed:  Consultant(s) Notes Reviewed:  Endocrine  Care Discussed with Consultants/Other Providers:

## 2020-04-25 NOTE — PROGRESS NOTE ADULT - NS ED BHA TELEPSYCH PATIENT INTERVIEW PRIVATE SPACE
Patient interviewed in a private space.

## 2020-04-25 NOTE — PROGRESS NOTE ADULT - NSREFPHYEXINPTDOCREFER_GEN_ALL_CORE
Internal Medicine 4/19/2020
4/24 internal medicine exam
4/20 .
Hospitalist
IM 4/24
4/19 IM Progress Note
IM 4/22

## 2020-04-25 NOTE — PROGRESS NOTE ADULT - PROBLEM SELECTOR PLAN 1
-Plaquenil (4/18-4/22), QTc 423 (4/18)  -titrate supp O2 to maintain sat 92-96%, currently on 6L NC; wean as tolerated.   -Supportive care.

## 2020-04-26 LAB
GLUCOSE BLDC GLUCOMTR-MCNC: 149 MG/DL — HIGH (ref 70–99)
GLUCOSE BLDC GLUCOMTR-MCNC: 179 MG/DL — HIGH (ref 70–99)
GLUCOSE BLDC GLUCOMTR-MCNC: 375 MG/DL — HIGH (ref 70–99)
GLUCOSE BLDC GLUCOMTR-MCNC: 98 MG/DL — SIGNIFICANT CHANGE UP (ref 70–99)

## 2020-04-26 PROCEDURE — 99232 SBSQ HOSP IP/OBS MODERATE 35: CPT

## 2020-04-26 RX ADMIN — Medication 10: at 12:15

## 2020-04-26 RX ADMIN — AMLODIPINE BESYLATE 10 MILLIGRAM(S): 2.5 TABLET ORAL at 06:17

## 2020-04-26 RX ADMIN — Medication 7 UNIT(S): at 12:15

## 2020-04-26 RX ADMIN — Medication 15 MILLIGRAM(S): at 06:17

## 2020-04-26 RX ADMIN — LACOSAMIDE 150 MILLIGRAM(S): 50 TABLET ORAL at 06:17

## 2020-04-26 RX ADMIN — ENOXAPARIN SODIUM 40 MILLIGRAM(S): 100 INJECTION SUBCUTANEOUS at 12:17

## 2020-04-26 RX ADMIN — LACOSAMIDE 150 MILLIGRAM(S): 50 TABLET ORAL at 17:33

## 2020-04-26 RX ADMIN — INSULIN GLARGINE 18 UNIT(S): 100 INJECTION, SOLUTION SUBCUTANEOUS at 22:45

## 2020-04-26 RX ADMIN — Medication 12 UNIT(S): at 09:00

## 2020-04-26 RX ADMIN — GABAPENTIN 300 MILLIGRAM(S): 400 CAPSULE ORAL at 22:42

## 2020-04-26 RX ADMIN — Medication 2: at 09:00

## 2020-04-26 RX ADMIN — FLUCONAZOLE 200 MILLIGRAM(S): 150 TABLET ORAL at 12:17

## 2020-04-26 RX ADMIN — Medication 300 MILLIGRAM(S): at 22:43

## 2020-04-26 RX ADMIN — Medication 1 TABLET(S): at 12:17

## 2020-04-26 RX ADMIN — ATORVASTATIN CALCIUM 40 MILLIGRAM(S): 80 TABLET, FILM COATED ORAL at 22:42

## 2020-04-26 RX ADMIN — Medication 7 UNIT(S): at 17:33

## 2020-04-26 RX ADMIN — PANTOPRAZOLE SODIUM 40 MILLIGRAM(S): 20 TABLET, DELAYED RELEASE ORAL at 06:17

## 2020-04-26 NOTE — PROGRESS NOTE ADULT - PROBLEM SELECTOR PLAN 9
VTE ppx: lovenox  -Dispo: -DCP soon once off of O2.     Transitions of Care Status:  1.  Name of PCP: Dr. Ash Marquez ([address] [place] [address])  2.  PCP Contacted on Admission: [ ] Y    [ ] N    3.  PCP contacted at Discharge: [ ] Y    [ ] N    [ ] N/A  4.  Post-Discharge Appointment Date and Location:  5.  Summary of Handoff given to PCP:

## 2020-04-26 NOTE — CHART NOTE - NSCHARTNOTEFT_GEN_A_CORE
Nutrition Follow-up Note  Patient seen for: nutrition consult and nutrition follow up    Chart reviewed, events noted.  "Dxd- h/o severe pulmonary and neurosarcoidosis with seizures on chronic prednisone, DM2 nonadherent to medications (A1c 14% in 4/2020), CKD III (baseline SCr 1.5), HTN, HLD; presented with HHS 2/2 COVID19 infection."    Source of Information:  Face-to-face RD interview deferred at this time secondary to restricted isolation precautions with pt medical condition. RD interview conducted with pt via phone at this time. Pt seems with limited interest in RD follow up a this time overall.     Current Diet: Consistent Carbohydrate (with evening snacks) + No Concentrated Potassium + No Pork + 3 Glucerna/daily    GI: no acute GI distress reported, states nausea and vomiting has resolved. Last BM 4/25 per pt.    PO intake: good, pt reports eating well now and improved appetite, states she doesn't want to drink the glucerna given recent vomiting earlier in admission. Pt recalls she spoke with RD a few days ago and was provided nutrition education at that time, declines need for review of nutrition education and is able to teach back points regarding monitoring carbohydrates at meals and importance of SMBG, however overall seems disengaged in conversation at this time. Pt made aware RD remains available.    Weights: no new wt to assess    Previous Nutrition Diagnosis: altered nutrition related labs related to endocrine dysfunction, behavioral issues and knowledge deficit    New Nutrition Diagnosis: n/a    Nutrition Care Plan:  [ ] In progress   [x] Achieved    Nutrition Recommendations:  1. Continue Consistent Carbohydrate (with evening snacks) and No Concentrated Potassium diet as indicated, No Pork per pt preference.  2. Discontinue glucerna shakes as pt is not drinking them  3. Provide/review nutrition education as indicated     Monitoring and Evaluation:   Continue to monitor Nutritional intake, Tolerance to diet prescription, weights, labs, skin integrity    RD remains available upon request and will follow up per protocol. Jasmin Carreon RD, [id number]

## 2020-04-26 NOTE — PROGRESS NOTE ADULT - PROBLEM SELECTOR PLAN 3
-s/p more Lokelma on 4/24. -K within normal yesterday.   patient ate banana 4/23 - advised to avoid given diabetes and hyperkalemia.  very poor dietary and lifestyle choices. -RD f/u.   -Low K diet.

## 2020-04-26 NOTE — PROGRESS NOTE ADULT - PROBLEM SELECTOR PLAN 1
-Plaquenil (4/18-4/22), QTc 423 (4/18)  -titrate supp O2 to maintain sat 92-96%, currently on 4L NC; wean as tolerated.   -Supportive care.

## 2020-04-26 NOTE — PROGRESS NOTE ADULT - SUBJECTIVE AND OBJECTIVE BOX
Patient is a 49y old  Female who presents with a chief complaint of HHS and COVID (25 Apr 2020 13:09)        SUBJECTIVE / OVERNIGHT EVENTS: Patient reports feeling better overall. Eating better. Breathing and cough a bit better. Says she ambulates without issue.       MEDICATIONS  (STANDING):  amLODIPine   Tablet 10 milliGRAM(s) Oral daily  atorvastatin 40 milliGRAM(s) Oral at bedtime  dextrose 5%. 1000 milliLiter(s) (50 mL/Hr) IV Continuous <Continuous>  dextrose 50% Injectable 12.5 Gram(s) IV Push once  dextrose 50% Injectable 25 Gram(s) IV Push once  dextrose 50% Injectable 25 Gram(s) IV Push once  enoxaparin Injectable 40 milliGRAM(s) SubCutaneous daily  fluconAZOLE   Tablet 200 milliGRAM(s) Oral daily  gabapentin 300 milliGRAM(s) Oral at bedtime  insulin glargine Injectable (LANTUS) 18 Unit(s) SubCutaneous at bedtime  insulin lispro (HumaLOG) corrective regimen sliding scale   SubCutaneous three times a day before meals  insulin lispro (HumaLOG) corrective regimen sliding scale   SubCutaneous at bedtime  insulin lispro Injectable (HumaLOG) 12 Unit(s) SubCutaneous before breakfast  insulin lispro Injectable (HumaLOG) 7 Unit(s) SubCutaneous before lunch  insulin lispro Injectable (HumaLOG) 7 Unit(s) SubCutaneous before dinner  lacosamide 150 milliGRAM(s) Oral two times a day  multivitamin/minerals 1 Tablet(s) Oral daily  nystatin    Suspension 835104 Unit(s) Oral four times a day  pantoprazole    Tablet 40 milliGRAM(s) Oral before breakfast  phenytoin   Capsule 300 milliGRAM(s) Oral at bedtime  predniSONE   Tablet 15 milliGRAM(s) Oral daily    MEDICATIONS  (PRN):  acetaminophen   Tablet .. 650 milliGRAM(s) Oral every 6 hours PRN Temp greater or equal to 38C (100.4F), Mild Pain (1 - 3), Moderate Pain (4 - 6)  ALBUTerol    90 MICROgram(s) HFA Inhaler 1 Puff(s) Inhalation every 4 hours PRN Shortness of Breath and/or Wheezing  dextrose 40% Gel 15 Gram(s) Oral once PRN Blood Glucose LESS THAN 70 milliGRAM(s)/deciliter  glucagon  Injectable 1 milliGRAM(s) IntraMuscular once PRN Glucose LESS THAN 70 milligrams/deciliter  ondansetron Injectable 4 milliGRAM(s) IV Push every 6 hours PRN Nausea and/or Vomiting      Vital Signs Last 24 Hrs  T(C): 37.3 (26 Apr 2020 08:15), Max: 37.4 (26 Apr 2020 06:10)  T(F): 99.1 (26 Apr 2020 08:15), Max: 99.4 (26 Apr 2020 06:10)  HR: 108 (26 Apr 2020 08:15) (101 - 108)  BP: 119/84 (26 Apr 2020 08:15) (119/84 - 130/85)  BP(mean): --  RR: 20 (26 Apr 2020 08:15) (20 - 20)  SpO2: 98% (26 Apr 2020 08:15) (98% - 99%)  CAPILLARY BLOOD GLUCOSE      POCT Blood Glucose.: 375 mg/dL (26 Apr 2020 11:56)  POCT Blood Glucose.: 179 mg/dL (26 Apr 2020 08:42)  POCT Blood Glucose.: 139 mg/dL (25 Apr 2020 23:34)  POCT Blood Glucose.: 108 mg/dL (25 Apr 2020 21:58)  POCT Blood Glucose.: 142 mg/dL (25 Apr 2020 17:02)    I&O's Summary    25 Apr 2020 07:01  -  26 Apr 2020 07:00  --------------------------------------------------------  IN: 1430 mL / OUT: 0 mL / NET: 1430 mL    26 Apr 2020 07:01  -  26 Apr 2020 15:20  --------------------------------------------------------  IN: 480 mL / OUT: 0 mL / NET: 480 mL          PHYSICAL EXAM:   GENERAL: NAD, well-developed  HEAD:  Atraumatic, Normocephalic  EYES: Conjunctiva and sclera clear  NECK: Supple   CHEST/LUNG: Crackles in bases.   HEART: S1S2 normal. Regular rate and rhythm; No murmurs, rubs, or gallops  ABDOMEN: Soft, Nontender, Nondistended; Bowel sounds present  EXTREMITIES:   No clubbing, cyanosis, or edema  PSYCH/Neuro: AAOx3. Non-focal.          LABS:                        10.0   8.74  )-----------( 457      ( 25 Apr 2020 10:46 )             32.2     04-25    136  |  100  |  19  ----------------------------<  304<H>  5.1   |  23  |  1.15    Ca    9.4      25 Apr 2020 10:46        RADIOLOGY & ADDITIONAL TESTS:    Imaging Personally Reviewed:   Consultant(s) Notes Reviewed:    Care Discussed with Consultants/Other Providers:

## 2020-04-27 ENCOUNTER — TRANSCRIPTION ENCOUNTER (OUTPATIENT)
Age: 50
End: 2020-04-27

## 2020-04-27 VITALS — OXYGEN SATURATION: 95 % | RESPIRATION RATE: 20 BRPM

## 2020-04-27 DIAGNOSIS — E78.2 MIXED HYPERLIPIDEMIA: ICD-10-CM

## 2020-04-27 LAB
ANION GAP SERPL CALC-SCNC: 13 MMOL/L — SIGNIFICANT CHANGE UP (ref 5–17)
BUN SERPL-MCNC: 19 MG/DL — SIGNIFICANT CHANGE UP (ref 7–23)
CALCIUM SERPL-MCNC: 9.6 MG/DL — SIGNIFICANT CHANGE UP (ref 8.4–10.5)
CHLORIDE SERPL-SCNC: 101 MMOL/L — SIGNIFICANT CHANGE UP (ref 96–108)
CO2 SERPL-SCNC: 23 MMOL/L — SIGNIFICANT CHANGE UP (ref 22–31)
CREAT SERPL-MCNC: 1.07 MG/DL — SIGNIFICANT CHANGE UP (ref 0.5–1.3)
GLUCOSE BLDC GLUCOMTR-MCNC: 116 MG/DL — HIGH (ref 70–99)
GLUCOSE BLDC GLUCOMTR-MCNC: 270 MG/DL — HIGH (ref 70–99)
GLUCOSE BLDC GLUCOMTR-MCNC: 87 MG/DL — SIGNIFICANT CHANGE UP (ref 70–99)
GLUCOSE SERPL-MCNC: 234 MG/DL — HIGH (ref 70–99)
HCT VFR BLD CALC: 31.8 % — LOW (ref 34.5–45)
HGB BLD-MCNC: 10.1 G/DL — LOW (ref 11.5–15.5)
MCHC RBC-ENTMCNC: 27.2 PG — SIGNIFICANT CHANGE UP (ref 27–34)
MCHC RBC-ENTMCNC: 31.8 GM/DL — LOW (ref 32–36)
MCV RBC AUTO: 85.7 FL — SIGNIFICANT CHANGE UP (ref 80–100)
NRBC # BLD: 0 /100 WBCS — SIGNIFICANT CHANGE UP (ref 0–0)
PLATELET # BLD AUTO: 515 K/UL — HIGH (ref 150–400)
POTASSIUM SERPL-MCNC: 4.9 MMOL/L — SIGNIFICANT CHANGE UP (ref 3.5–5.3)
POTASSIUM SERPL-SCNC: 4.9 MMOL/L — SIGNIFICANT CHANGE UP (ref 3.5–5.3)
RBC # BLD: 3.71 M/UL — LOW (ref 3.8–5.2)
RBC # FLD: 11.9 % — SIGNIFICANT CHANGE UP (ref 10.3–14.5)
SODIUM SERPL-SCNC: 137 MMOL/L — SIGNIFICANT CHANGE UP (ref 135–145)
WBC # BLD: 9.43 K/UL — SIGNIFICANT CHANGE UP (ref 3.8–10.5)
WBC # FLD AUTO: 9.43 K/UL — SIGNIFICANT CHANGE UP (ref 3.8–10.5)

## 2020-04-27 PROCEDURE — 84145 PROCALCITONIN (PCT): CPT

## 2020-04-27 PROCEDURE — 87635 SARS-COV-2 COVID-19 AMP PRB: CPT

## 2020-04-27 PROCEDURE — 82330 ASSAY OF CALCIUM: CPT

## 2020-04-27 PROCEDURE — 80053 COMPREHEN METABOLIC PANEL: CPT

## 2020-04-27 PROCEDURE — 71045 X-RAY EXAM CHEST 1 VIEW: CPT

## 2020-04-27 PROCEDURE — 85379 FIBRIN DEGRADATION QUANT: CPT

## 2020-04-27 PROCEDURE — 81001 URINALYSIS AUTO W/SCOPE: CPT

## 2020-04-27 PROCEDURE — 82550 ASSAY OF CK (CPK): CPT

## 2020-04-27 PROCEDURE — 93005 ELECTROCARDIOGRAM TRACING: CPT

## 2020-04-27 PROCEDURE — 84132 ASSAY OF SERUM POTASSIUM: CPT

## 2020-04-27 PROCEDURE — 83735 ASSAY OF MAGNESIUM: CPT

## 2020-04-27 PROCEDURE — 85014 HEMATOCRIT: CPT

## 2020-04-27 PROCEDURE — 96375 TX/PRO/DX INJ NEW DRUG ADDON: CPT

## 2020-04-27 PROCEDURE — 96365 THER/PROPH/DIAG IV INF INIT: CPT | Mod: XU

## 2020-04-27 PROCEDURE — 83036 HEMOGLOBIN GLYCOSYLATED A1C: CPT

## 2020-04-27 PROCEDURE — 86341 ISLET CELL ANTIBODY: CPT

## 2020-04-27 PROCEDURE — 99285 EMERGENCY DEPT VISIT HI MDM: CPT | Mod: 25

## 2020-04-27 PROCEDURE — 84100 ASSAY OF PHOSPHORUS: CPT

## 2020-04-27 PROCEDURE — 85384 FIBRINOGEN ACTIVITY: CPT

## 2020-04-27 PROCEDURE — 86140 C-REACTIVE PROTEIN: CPT

## 2020-04-27 PROCEDURE — 82435 ASSAY OF BLOOD CHLORIDE: CPT

## 2020-04-27 PROCEDURE — 82962 GLUCOSE BLOOD TEST: CPT

## 2020-04-27 PROCEDURE — 80186 ASSAY OF PHENYTOIN FREE: CPT

## 2020-04-27 PROCEDURE — 83605 ASSAY OF LACTIC ACID: CPT

## 2020-04-27 PROCEDURE — 82803 BLOOD GASES ANY COMBINATION: CPT

## 2020-04-27 PROCEDURE — 80185 ASSAY OF PHENYTOIN TOTAL: CPT

## 2020-04-27 PROCEDURE — 82010 KETONE BODYS QUAN: CPT

## 2020-04-27 PROCEDURE — 85730 THROMBOPLASTIN TIME PARTIAL: CPT

## 2020-04-27 PROCEDURE — 83880 ASSAY OF NATRIURETIC PEPTIDE: CPT

## 2020-04-27 PROCEDURE — 83930 ASSAY OF BLOOD OSMOLALITY: CPT

## 2020-04-27 PROCEDURE — 74176 CT ABD & PELVIS W/O CONTRAST: CPT

## 2020-04-27 PROCEDURE — 84295 ASSAY OF SERUM SODIUM: CPT

## 2020-04-27 PROCEDURE — 82947 ASSAY GLUCOSE BLOOD QUANT: CPT

## 2020-04-27 PROCEDURE — 85027 COMPLETE CBC AUTOMATED: CPT

## 2020-04-27 PROCEDURE — 85610 PROTHROMBIN TIME: CPT

## 2020-04-27 PROCEDURE — 80235 DRUG ASSAY LACOSAMIDE: CPT

## 2020-04-27 PROCEDURE — 80048 BASIC METABOLIC PNL TOTAL CA: CPT

## 2020-04-27 PROCEDURE — 99239 HOSP IP/OBS DSCHRG MGMT >30: CPT

## 2020-04-27 PROCEDURE — 82728 ASSAY OF FERRITIN: CPT

## 2020-04-27 PROCEDURE — 83690 ASSAY OF LIPASE: CPT

## 2020-04-27 PROCEDURE — 83615 LACTATE (LD) (LDH) ENZYME: CPT

## 2020-04-27 PROCEDURE — 84681 ASSAY OF C-PEPTIDE: CPT

## 2020-04-27 PROCEDURE — 99232 SBSQ HOSP IP/OBS MODERATE 35: CPT

## 2020-04-27 PROCEDURE — 84484 ASSAY OF TROPONIN QUANT: CPT

## 2020-04-27 RX ORDER — LOSARTAN POTASSIUM 100 MG/1
1 TABLET, FILM COATED ORAL
Qty: 0 | Refills: 0 | DISCHARGE

## 2020-04-27 RX ORDER — INSULIN GLARGINE 100 [IU]/ML
28 INJECTION, SOLUTION SUBCUTANEOUS
Qty: 840 | Refills: 0
Start: 2020-04-27 | End: 2020-05-26

## 2020-04-27 RX ORDER — INSULIN LISPRO 100/ML
6 VIAL (ML) SUBCUTANEOUS ONCE
Refills: 0 | Status: COMPLETED | OUTPATIENT
Start: 2020-04-27 | End: 2020-04-27

## 2020-04-27 RX ORDER — SITAGLIPTIN AND METFORMIN HYDROCHLORIDE 500; 50 MG/1; MG/1
1 TABLET, FILM COATED ORAL
Qty: 0 | Refills: 0 | DISCHARGE

## 2020-04-27 RX ORDER — INSULIN LISPRO 100/ML
10 VIAL (ML) SUBCUTANEOUS
Qty: 30 | Refills: 0
Start: 2020-04-27 | End: 2020-05-26

## 2020-04-27 RX ORDER — INSULIN GLARGINE 100 [IU]/ML
36 INJECTION, SOLUTION SUBCUTANEOUS
Qty: 0 | Refills: 0 | DISCHARGE
Start: 2020-04-27 | End: 2020-05-26

## 2020-04-27 RX ORDER — MULTIVIT-MIN/FERROUS GLUCONATE 9 MG/15 ML
1 LIQUID (ML) ORAL
Qty: 0 | Refills: 0 | DISCHARGE
Start: 2020-04-27

## 2020-04-27 RX ORDER — ACETAMINOPHEN 500 MG
2 TABLET ORAL
Qty: 0 | Refills: 0 | DISCHARGE
Start: 2020-04-27

## 2020-04-27 RX ADMIN — PANTOPRAZOLE SODIUM 40 MILLIGRAM(S): 20 TABLET, DELAYED RELEASE ORAL at 05:56

## 2020-04-27 RX ADMIN — FLUCONAZOLE 200 MILLIGRAM(S): 150 TABLET ORAL at 13:31

## 2020-04-27 RX ADMIN — LACOSAMIDE 150 MILLIGRAM(S): 50 TABLET ORAL at 05:55

## 2020-04-27 RX ADMIN — Medication 7 UNIT(S): at 12:03

## 2020-04-27 RX ADMIN — Medication 6 UNIT(S): at 09:06

## 2020-04-27 RX ADMIN — Medication 6: at 12:01

## 2020-04-27 RX ADMIN — Medication 1 TABLET(S): at 13:31

## 2020-04-27 RX ADMIN — LACOSAMIDE 150 MILLIGRAM(S): 50 TABLET ORAL at 17:18

## 2020-04-27 RX ADMIN — AMLODIPINE BESYLATE 10 MILLIGRAM(S): 2.5 TABLET ORAL at 05:55

## 2020-04-27 RX ADMIN — Medication 15 MILLIGRAM(S): at 05:55

## 2020-04-27 RX ADMIN — ENOXAPARIN SODIUM 40 MILLIGRAM(S): 100 INJECTION SUBCUTANEOUS at 12:53

## 2020-04-27 RX ADMIN — Medication 7 UNIT(S): at 17:17

## 2020-04-27 RX ADMIN — Medication 500000 UNIT(S): at 13:31

## 2020-04-27 NOTE — DISCHARGE NOTE PROVIDER - NSDCFUADDAPPT_GEN_ALL_CORE_FT
Follow up with Endocrinology with [facility] at [address] [place] [address], call [phone] for appointment.

## 2020-04-27 NOTE — DISCHARGE NOTE NURSING/CASE MANAGEMENT/SOCIAL WORK - NSDCFUADDAPPT_GEN_ALL_CORE_FT
Follow up with Endocrinology with [facility] at [address] [place] [address], call [phone] for appointment.

## 2020-04-27 NOTE — DISCHARGE NOTE PROVIDER - NSDCFUADDINST_GEN_ALL_CORE_FT
Humalog 3x a day before meals   Blood Glucose:  : 10 units  151-250: 12 units  251-350: 14 units  over 351: 18 units

## 2020-04-27 NOTE — DISCHARGE NOTE PROVIDER - NSDCCPCAREPLAN_GEN_ALL_CORE_FT
PRINCIPAL DISCHARGE DIAGNOSIS  Diagnosis: Hyperglycemic crisis in diabetes mellitus  Assessment and Plan of Treatment:   Follow up with Endocrinology with [facility] at [address] [facility], call [phone] for appointment.      SECONDARY DISCHARGE DIAGNOSES  Diagnosis: Hyperkalemia  Assessment and Plan of Treatment: treated, resolved.    Diagnosis: COVID-19 virus infection  Assessment and Plan of Treatment: Quarantine yourself for 14 days   Please follow up with your PCP in 1-2 weeks -Call your Provider before hand to make then aware of your hospitalization. If you do not have a PMD please call [phone] for follow up.  Take Tylenol for Fevers every 6 hours as needed- Do not exceed 4gm of Tylenol in a 24 hour period  Avoid NSAIDs  Stay hydrated   WEAR A FACE MASK   Cover your cough and sneezes   Clean your hands often   Avoid sharing personal house hold items   Clean all high touch surfaces- everyday items like table tops , door knobs, cell phones etc   You should restrict activities outside your home except for getting medical care   Avoid using public transportation  Do not go to work, school, or Public areas   Monitor your oxygen saturation   Call [facility] at [phone] PRINCIPAL DISCHARGE DIAGNOSIS  Diagnosis: Hyperglycemic crisis in diabetes mellitus  Assessment and Plan of Treatment: -You were discharged with a Freestyle Charlie Sensor and [place], will have RN/CDE Eve Cagle f/u with you by phone  -Discharge on: Lantus 28 units sq qhs and humalog scale: bs : 10 units, 151-250: 12 units, 251-350: 14 units, and over 351: 18 units  Follow up with Endocrinology with [facility] at [address] [place] [address], call [phone] for appointment.      SECONDARY DISCHARGE DIAGNOSES  Diagnosis: Hyperkalemia  Assessment and Plan of Treatment: treated, resolved.    Diagnosis: COVID-19 virus infection  Assessment and Plan of Treatment: Quarantine yourself for 14 days   Please follow up with your PCP in 1-2 weeks -Call your Provider before hand to make then aware of your hospitalization. If you do not have a PMD please call [phone] for follow up.  Take Tylenol for Fevers every 6 hours as needed- Do not exceed 4gm of Tylenol in a 24 hour period  Avoid NSAIDs  Stay hydrated   WEAR A FACE MASK   Cover your cough and sneezes   Clean your hands often   Avoid sharing personal house hold items   Clean all high touch surfaces- everyday items like table tops , door knobs, cell phones etc   You should restrict activities outside your home except for getting medical care   Avoid using public transportation  Do not go to work, school, or Public areas   Monitor your oxygen saturation   Call [facility] at [phone]

## 2020-04-27 NOTE — PROGRESS NOTE ADULT - PROBLEM SELECTOR PLAN 3
-s/p more Lokelma on 4/24. -K within normal today.   patient ate banana 4/23 - advised to avoid given diabetes and hyperkalemia.  very poor dietary and lifestyle choices. -RD f/u appreciated.   -Low K diet advised.  -Hold home losartan on DC.

## 2020-04-27 NOTE — PROGRESS NOTE ADULT - NSHPATTENDINGPLANDISCUSS_GEN_ALL_CORE
PA and RN
Diana, NP
MARYANN Arguello
MARYANN Cortes
MARYANN Dubon, and HOOD
MARYANN Lane, and HOOD
MARYANN Yousif and RN

## 2020-04-27 NOTE — PROGRESS NOTE ADULT - PROBLEM SELECTOR PLAN 9
VTE ppx: lovenox while admitted only.   -Dispo: -DCP today since off of supplemental O2. -37 minutes spent on the discharge process.     Transitions of Care Status:  1.  Name of PCP: Dr. Ash Marquez ([address] [place] [address])  2.  PCP Contacted on Admission: [ ] Y    [ ] N    3.  PCP contacted at Discharge: [ ] Y    [ ] N    [ ] N/A  4.  Post-Discharge Appointment Date and Location:  5.  Summary of Handoff given to PCP:

## 2020-04-27 NOTE — DISCHARGE NOTE NURSING/CASE MANAGEMENT/SOCIAL WORK - PATIENT PORTAL LINK FT
You can access the FollowMyHealth Patient Portal offered by [facility] by registering at the following website: [email or web address]. By joining [facility]’s FollowMyHealth portal, you will also be able to view your health information using other applications (apps) compatible with our system.

## 2020-04-27 NOTE — PROGRESS NOTE ADULT - PROBLEM SELECTOR PLAN 1
-Plaquenil (4/18-4/22), QTc 423 (4/18)  -titrate supp O2 to maintain sat 92-96%. -Today saturating well on room air (~94%).   -Supportive care.

## 2020-04-27 NOTE — DISCHARGE NOTE PROVIDER - NSDCFUSCHEDAPPT_GEN_ALL_CORE_FT
LEONEL BEVERLY ; 05/14/2020 ; NPP Neurology [address] LEONEL Love ; 05/18/2020 ; NPP Podiatry [address] LEONEL Hdz ; 05/27/2020 ; NPP Med Int [address] [facility] LEONEL BEVERLY ; 05/14/2020 ; NPP Neurology [address] LEONEL Love ; 05/18/2020 ; NPP Podiatry [address] LEONEL Hdz ; 05/27/2020 ; NPP Med Int [address] [facility] LEONEL BEVERLY ; 05/14/2020 ; NPP Neurology [address] LEONEL Love ; 05/18/2020 ; NPP Podiatry [address] LEONEL Hdz ; 05/27/2020 ; NPP Med Int [address] [facility]

## 2020-04-27 NOTE — PROGRESS NOTE ADULT - PROBLEM SELECTOR PROBLEM 8
Hypertension, unspecified type
Candidiasis
Candidiasis
Hypertension, unspecified type
Candidiasis

## 2020-04-27 NOTE — DISCHARGE NOTE PROVIDER - HOSPITAL COURSE
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  Per outpatient notes pt on prednisone 15 daily.  Pt recently given fluconazole and nystatin for concern for oral candidiasis.  Pt previously noted to be taking roughly 35 Lantus and 15 Humalog TID however had episodes of hypoglycemia for which she was told to take Humalog 5 when she notes FS <120.          Patient with complex medical history admitted to COVID-ICU (PACU) for hyperglycemia and metabolic abnormalities. She has been found to be COVID-19 positive. She has had decreased PO intake with nausea/vomiting/diarrhea for the last 5-7 days. She has not taken any insulin during this time. She continued to feel unwell prompting hospitalization. In the ED she was found to have blood sugars > 900 with a relatively normal AG. Patient started on insulin drip. BMPs q4 to monitor hyperkalemia. Started on 75cc/hr LR. --> HHS mgmt thru out the day --> and was weaned off by the late afternoon.   Endocrinology was consulted for management of DM.  Patient FS improved and patient was stable for transfer to [facility] [address] [place] on 4/18. Patient was weaned off supplemental oxygen.  At the time of discharge, patient was tolerating a diet, having GI fxn, FS controlled via endo recs, and at rest and ambulating with O2 sat >92%. The patient was stable for discharge home. She felt comfortable with discharge and had no other questions. 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  Per outpatient notes pt on prednisone 15 daily.  Pt recently given fluconazole and nystatin for concern for oral candidiasis.  Pt previously noted to be taking roughly 35 Lantus and 15 Humalog TID however had episodes of hypoglycemia for which she was told to take Humalog 5 when she notes FS <120.          Patient with complex medical history admitted to COVID-ICU (PACU) for hyperglycemia and metabolic abnormalities. She has been found to be COVID-19 positive. She has had decreased PO intake with nausea/vomiting/diarrhea for the last 5-7 days. She has not taken any insulin during this time. She continued to feel unwell prompting hospitalization. In the ED she was found to have blood sugars > 900 with a relatively normal AG. Patient started on insulin drip. BMPs q4 to monitor hyperkalemia. Started on 75cc/hr LR. --> HHS mgmt thru out the day --> and was weaned off by the late afternoon.   Endocrinology was consulted for management of DM.  Patient FS improved and patient was stable for transfer to [facility] [address] [place] on 4/18. Patient was weaned off supplemental oxygen.  At the time of discharge, patient was tolerating a diet, having GI fxn, FS controlled via endo recs, and at rest and ambulating with O2 sat >92%. The patient was stable for discharge home. Pt will follow up with PMD and endo.  She felt comfortable with discharge and had no other questions. 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  Per outpatient notes pt on prednisone 15 daily.  Pt recently given fluconazole and nystatin for concern for oral candidiasis.  Pt previously noted to be taking roughly 35 Lantus and 15 Humalog TID however had episodes of hypoglycemia for which she was told to take Humalog 5 when she notes FS <120.          Patient with complex medical history admitted to COVID-ICU (PACU) for hyperglycemia and metabolic abnormalities. She has been found to be COVID-19 positive. She has had decreased PO intake with nausea/vomiting/diarrhea for the last 5-7 days. She has not taken any insulin during this time. She continued to feel unwell prompting hospitalization. In the ED she was found to have blood sugars > 900 with a relatively normal AG. Patient started on insulin drip. BMPs q4 to monitor hyperkalemia. Started on 75cc/hr LR. --> HHS mgmt thru out the day --> and was weaned off by the late afternoon.   Endocrinology was consulted for management of DM.  Patient FS improved and patient was stable for transfer to [facility] [address] [place] on 4/18. Patient was weaned off supplemental oxygen.  At the time of discharge, patient was tolerating a diet, having GI fxn, FS controlled via endo recs, and at rest and ambulating with O2 sat >92%. The patient was stable for discharge home. Pt will follow up with PMD and endo.  Patient does not require dvt ppx upon dc per attending Dr. Loredo. She felt comfortable with discharge and had no other questions.

## 2020-04-27 NOTE — PROGRESS NOTE ADULT - ASSESSMENT
50 y/o female with uncontrolled T2DM (A1c 14%) with history of pulmonary and neurosarcoidosis and h/o seizure on steroids at home here with COVID-19 infection and HHS. On prednisone 15mg daily w/hyperglycemia. BG goal (100-180mg/dl). Tolerating POs w/improved PO intake.

## 2020-04-27 NOTE — PROGRESS NOTE ADULT - PROBLEM SELECTOR PLAN 2
- Ca is stable  - Continue steroids as per primary team  - Please notify us if steroid doses are changed
- Ca is stable  - Continue steroids as per primary team  - Please notify us if steroid doses are changed
- Ca is stable  - Continue steroids as per primary team  - Please notify us if steroid doses are changed    Cedric Prince MD, Endocrine Fellow   Pager  from 9-5PM. After hours and on weekends please call [phone]
- Ca is stable  - Continue steroids as per primary team  - Please notify us if steroid doses are changed    Cedric Prince MD, Endocrine Fellow   Pager  from 9-5PM. After hours and on weekends please call [phone]
- Well controlled, at goal <130/80  - Continue current meds
-continue atorvastatin 40mg po qhs  -discussed with NP Ling Easley MD  Pager: [phone]  Nights and Weekends: [phone]
-continue home oral fluconazole.   -Oral candidiasis on tongue noted causing difficulty eating. Will add Nystatin oral for 10 days.
-continue home oral fluconazole.   -Oral candidiasis on tongue noted causing difficulty eating. Added Nystatin oral for 10 days.
A1c 14, not adherent with medications. Home regimen lantus 34u qhs, humalog 5u premeal, janumet  -hold home oral agents  -lantus 10u qhs, humalog 3u premeal  -Endo following. Goal glucose 100-180  -Needs outpt. endo, optho, podiatry, and nephrology followup  -F/u labs recommended by endo.  -FAYE glasgow. Glucerdomenic.
A1c 14, not adherent with medications. Home regimen lantus 34u qhs, humalog 5u premeal, janumet  -hold home oral agents  -lantus 10u qhs, humalog 3u premeal, per endo.   -Endo following. Goal glucose 100-180  -Needs outpt. endo, optho, podiatry, and nephrology followup  -F/u labs recommended by endo.  -FAYE glasgow appreciated. Glucerna.
A1c 14, not adherent with medications. Home regimen lantus 34u qhs, humalog 5u premeal, janumet  -hold home oral agents  -lantus 30u qhs, humalog 8u premeal  -Endo following. Goal glucose 100-180  -Needs outpt. endo, optho, podiatry, and nephrology followup

## 2020-04-27 NOTE — PROGRESS NOTE ADULT - PROBLEM SELECTOR PLAN 8
Blood pressure meds with hold parameters
-continue home oral fluconazole
-continue home oral fluconazole.   -Oral candidiasis on tongue noted causing difficulty eating. Will add Nystatin oral for 10 days.
Blood pressure meds with hold parameters.   -Currently acceptable on amlodipine.
Blood pressure meds with hold parameters.   -Currently acceptable on amlodipine.
Blood pressure meds with hold parameters.   -Currently acceptable on amlodipine.  -Hold home losartan on DC.
-continue home oral fluconazole

## 2020-04-27 NOTE — PROGRESS NOTE ADULT - PROBLEM SELECTOR PROBLEM 4
Allergy; Type 2 diabetes mellitus with hyperosmolarity without coma, with long-term current use of insulin

## 2020-04-27 NOTE — DISCHARGE NOTE PROVIDER - CARE PROVIDER_API CALL
Dr. Ash Marquez  Phone: (   )    -  Fax: (   )    -  Follow Up Time: Dr. Ash Marquez  Phone: (   )    -  Fax: (   )    -  Follow Up Time:     Alen Loredo)  Internal Medicine  [address] [place] [address]  Phone: [phone]  Fax: [phone]  Follow Up Time:

## 2020-04-27 NOTE — DISCHARGE NOTE PROVIDER - PROVIDER TOKENS
FREE:[LAST:[Jimmy],FIRST:[Dr. Aleman],PHONE:[(   )    -],FAX:[(   )    -]] FREE:[LAST:[Marquez],FIRST:[Dr. Aleman],PHONE:[(   )    -],FAX:[(   )    -]],PROVIDER:[TOKEN:[[id number]]]

## 2020-04-27 NOTE — PROGRESS NOTE ADULT - SUBJECTIVE AND OBJECTIVE BOX
Patient is a 49y old  Female who presents with a chief complaint of SOB (27 Apr 2020 14:26)        SUBJECTIVE / OVERNIGHT EVENTS: Patient reports breathing better. Ambulating without issues. Eating better. Feels better. She would like to go home.       MEDICATIONS  (STANDING):  amLODIPine   Tablet 10 milliGRAM(s) Oral daily  atorvastatin 40 milliGRAM(s) Oral at bedtime  dextrose 5%. 1000 milliLiter(s) (50 mL/Hr) IV Continuous <Continuous>  dextrose 50% Injectable 12.5 Gram(s) IV Push once  dextrose 50% Injectable 25 Gram(s) IV Push once  dextrose 50% Injectable 25 Gram(s) IV Push once  enoxaparin Injectable 40 milliGRAM(s) SubCutaneous daily  fluconAZOLE   Tablet 200 milliGRAM(s) Oral daily  gabapentin 300 milliGRAM(s) Oral at bedtime  insulin glargine Injectable (LANTUS) 18 Unit(s) SubCutaneous at bedtime  insulin lispro (HumaLOG) corrective regimen sliding scale   SubCutaneous three times a day before meals  insulin lispro (HumaLOG) corrective regimen sliding scale   SubCutaneous at bedtime  insulin lispro Injectable (HumaLOG) 7 Unit(s) SubCutaneous before lunch  insulin lispro Injectable (HumaLOG) 7 Unit(s) SubCutaneous before dinner  insulin lispro Injectable (HumaLOG) 12 Unit(s) SubCutaneous before breakfast  lacosamide 150 milliGRAM(s) Oral two times a day  multivitamin/minerals 1 Tablet(s) Oral daily  nystatin    Suspension 345519 Unit(s) Oral four times a day  pantoprazole    Tablet 40 milliGRAM(s) Oral before breakfast  phenytoin   Capsule 300 milliGRAM(s) Oral at bedtime  predniSONE   Tablet 15 milliGRAM(s) Oral daily    MEDICATIONS  (PRN):  acetaminophen   Tablet .. 650 milliGRAM(s) Oral every 6 hours PRN Temp greater or equal to 38C (100.4F), Mild Pain (1 - 3), Moderate Pain (4 - 6)  ALBUTerol    90 MICROgram(s) HFA Inhaler 1 Puff(s) Inhalation every 4 hours PRN Shortness of Breath and/or Wheezing  dextrose 40% Gel 15 Gram(s) Oral once PRN Blood Glucose LESS THAN 70 milliGRAM(s)/deciliter  glucagon  Injectable 1 milliGRAM(s) IntraMuscular once PRN Glucose LESS THAN 70 milligrams/deciliter  ondansetron Injectable 4 milliGRAM(s) IV Push every 6 hours PRN Nausea and/or Vomiting      Vital Signs Last 24 Hrs  T(C): 37 (27 Apr 2020 08:07), Max: 37.2 (27 Apr 2020 05:25)  T(F): 98.6 (27 Apr 2020 08:07), Max: 98.9 (27 Apr 2020 05:25)  HR: 101 (27 Apr 2020 08:07) (99 - 101)  BP: 129/82 (27 Apr 2020 08:07) (118/81 - 129/82)  BP(mean): --  RR: 20 (27 Apr 2020 14:53) (20 - 20)  SpO2: 95% (27 Apr 2020 14:53) (95% - 100%)  CAPILLARY BLOOD GLUCOSE      POCT Blood Glucose.: 270 mg/dL (27 Apr 2020 11:44)  POCT Blood Glucose.: 116 mg/dL (27 Apr 2020 08:28)  POCT Blood Glucose.: 87 mg/dL (27 Apr 2020 01:07)  POCT Blood Glucose.: 98 mg/dL (26 Apr 2020 21:49)  POCT Blood Glucose.: 149 mg/dL (26 Apr 2020 17:12)    I&O's Summary    26 Apr 2020 07:01  -  27 Apr 2020 07:00  --------------------------------------------------------  IN: 1080 mL / OUT: 0 mL / NET: 1080 mL    27 Apr 2020 07:01  -  27 Apr 2020 15:19  --------------------------------------------------------  IN: 360 mL / OUT: 0 mL / NET: 360 mL          PHYSICAL EXAM:   GENERAL: NAD, well-developed  HEAD:  Atraumatic, Normocephalic  EYES: Conjunctiva and sclera clear  NECK: Supple   CHEST/LUNG: Clear to auscultation bilaterally; No wheeze  HEART: S1S2 normal. Regular rate and rhythm; No murmurs, rubs, or gallops  ABDOMEN: Soft, Nontender, Nondistended; Bowel sounds present  EXTREMITIES:   No clubbing, cyanosis, or edema  PSYCH/Neuro: AAOx3. Non-focal.          LABS:                        10.1   9.43  )-----------( 515      ( 27 Apr 2020 10:53 )             31.8     04-27    137  |  101  |  19  ----------------------------<  234<H>  4.9   |  23  |  1.07    Ca    9.6      27 Apr 2020 10:53          RADIOLOGY & ADDITIONAL TESTS:    Imaging Personally Reviewed:  Consultant(s) Notes Reviewed:  Endo  Care Discussed with Consultants/Other Providers:

## 2020-04-27 NOTE — DISCHARGE NOTE PROVIDER - NSDCMRMEDTOKEN_GEN_ALL_CORE_FT
albuterol 90 mcg/inh inhalation aerosol: 2 puff(s) inhaled every 4 hours, As Needed  amLODIPine 10 mg oral tablet: 1 tab(s) orally once a day  atorvastatin 40 mg oral tablet: 1 tab(s) orally once a day (at bedtime)  Basaglar KwikPen 100 units/mL subcutaneous solution: 34 unit(s) subcutaneous once a day (at bedtime)  docusate sodium 100 mg oral capsule: 1 cap(s) orally 3 times a day  fluconazole 200 mg oral tablet: 1 tab(s) orally once a day  fluticasone 50 mcg/inh nasal spray: 1 spray(s) nasal 2 times a day  gabapentin 300 mg oral capsule: 1 cap(s) orally once a day (at bedtime)  glucometer (per patient&#x27;s insurance): Test blood sugars four times a day. Dispense #1 glucometer.  HumaLOG 100 units/mL injectable solution: 5 unit(s) subcutaneous 3 times a day (before meals)  Janumet 50 mg-500 mg oral tablet: 1 tab(s) orally once a day  lancets: 1 application subcutaneously 4 times a day   losartan 25 mg oral tablet: 1 tab(s) orally once a day (in the morning)  losartan 50 mg oral tablet: 1 tab(s) orally once a day (in the evening)  nystatin 100,000 units/mL oral suspension: 4 milliliter(s) orally 4 times a day  pantoprazole 40 mg oral delayed release tablet: 1 tab(s) orally once a day (before a meal)  phenytoin 100 mg oral capsule: 3 cap(s) orally once a day (at bedtime)  predniSONE 5 mg oral tablet: 3 tab(s) orally once a day  test strips (per patient&#x27;s insurance): 1 application subcutaneously 4 times a day. ** Compatible with patient&#x27;s glucometer **  Vimpat 150 mg oral tablet: 1 tab(s) orally 2 times a day  Vitamin D3 2000 intl units (50 mcg) oral tablet: 1 tab(s) orally once a day acetaminophen 325 mg oral tablet: 2 tab(s) orally every 6 hours, As needed, Temp greater or equal to 38C (100.4F), Mild Pain (1 - 3), Moderate Pain (4 - 6)  albuterol 90 mcg/inh inhalation aerosol: 2 puff(s) inhaled every 4 hours, As Needed  amLODIPine 10 mg oral tablet: 1 tab(s) orally once a day  atorvastatin 40 mg oral tablet: 1 tab(s) orally once a day (at bedtime)  Basaglar KwikPen 100 units/mL subcutaneous solution: 28 unit(s) subcutaneous once a day (at bedtime)   docusate sodium 100 mg oral capsule: 1 cap(s) orally 3 times a day  fluconazole 200 mg oral tablet: 1 tab(s) orally once a day  fluticasone 50 mcg/inh nasal spray: 1 spray(s) nasal 2 times a day  gabapentin 300 mg oral capsule: 1 cap(s) orally once a day (at bedtime)  glucometer (per patient&#x27;s insurance): Test blood sugars four times a day. Dispense #1 glucometer.  HumaLOG 100 units/mL injectable solution: 10 unit(s) subcutaneous 3 times a day (before meals) BS: : 10 units, 151-250: 12 units, 251-350: 14 units, over 351: 18 units  humalog sliding scale: Blood Glucose:  : 10 units  151-250: 12 units  251-350: 14 units  over 351: 18 units    lancets: 1 application subcutaneously 4 times a day   Multiple Vitamins with Minerals oral tablet: 1 tab(s) orally once a day  nystatin 100,000 units/mL oral suspension: 4 milliliter(s) orally 4 times a day  pantoprazole 40 mg oral delayed release tablet: 1 tab(s) orally once a day (before a meal)  phenytoin 100 mg oral capsule: 3 cap(s) orally once a day (at bedtime)  predniSONE 5 mg oral tablet: 3 tab(s) orally once a day  test strips (per patient&#x27;s insurance): 1 application subcutaneously 4 times a day. ** Compatible with patient&#x27;s glucometer **  Vimpat 150 mg oral tablet: 1 tab(s) orally 2 times a day  Vitamin D3 2000 intl units (50 mcg) oral tablet: 1 tab(s) orally once a day

## 2020-04-27 NOTE — PROGRESS NOTE ADULT - PROBLEM SELECTOR PROBLEM 1
COVID-19
Type 2 diabetes mellitus with hyperosmolarity without coma, with long-term current use of insulin
COVID-19
Type 2 diabetes mellitus with hyperosmolarity without coma, with long-term current use of insulin

## 2020-04-27 NOTE — PROGRESS NOTE ADULT - ATTENDING COMMENTS
Alen Loredo MD  Division of [facility] Medicine  Cell: [phone]  Pager: [phone]  Office: [phone]
Alen Loredo MD  Division of [facility] Medicine  Cell: [phone]  Pager: [phone]  Office: [phone]
Alen Loredo MD  Division of [facility] Medicine  Cell: [phone]  Pager: [phone]  Office: [phone]
Alen Loredo MD  Division of [facility] Medicine  Cell: [phone]  Pager: [phone]  Office: [phone]
Alen Loredo MD  Division of [facility] Medicine  Cell: [phone]  Pager: [phone]  Office: [phone]
Agree with assessment and plan as above by Dr. Prince. Reviewed all pertinent labs, glucose values, and imaging studies. Modifications made as indicated above. Hyperglycemia likely related to improved po intake and [employer]. Will needs more insulin so increase Lantus to 20 and Humalog to 10 units with meals. Will adjust further as needed.     Jayce Mars D.O  [phone]
Agree with assessment and plan as above by Dr. Prince. Reviewed all pertinent labs, glucose values, and imaging studies. Modifications made as indicated above. Decrease Humalog to prevent hypoglycemia. Monitor on Lantus 15 for now.    Jayce Mars D.O  [phone]
Agree with assessment and plan as above by Dr. Prince. Reviewed all pertinent labs, glucose values, and imaging studies. Modifications made as indicated above. Now eating better. Increase Lantus to 15 and Humalog to 8.    Jayce Masr D.O  [phone]
discharge home with home oxygen when lower oxygen requirement.  patient keen to go home to see her  grandchild.  plan of care discussed with floor NP/PA
plan of care discussed with floor NP/PA
very poor dietary and lifestyle choices predict a poor long term outcome from diabetes!  plan of care discussed with floor NP/PA   hopefully can be discharged home early next week with home oxygen

## 2020-04-27 NOTE — PROGRESS NOTE ADULT - SUBJECTIVE AND OBJECTIVE BOX
Chief Complaint/Follow-up on: T2D    Subjective: Patient has been weaned off of oxygen so she is being prepared for discharge. She is feeling better.    MEDICATIONS  (STANDING):  amLODIPine   Tablet 10 milliGRAM(s) Oral daily  atorvastatin 40 milliGRAM(s) Oral at bedtime  dextrose 5%. 1000 milliLiter(s) (50 mL/Hr) IV Continuous <Continuous>  dextrose 50% Injectable 12.5 Gram(s) IV Push once  dextrose 50% Injectable 25 Gram(s) IV Push once  dextrose 50% Injectable 25 Gram(s) IV Push once  enoxaparin Injectable 40 milliGRAM(s) SubCutaneous daily  fluconAZOLE   Tablet 200 milliGRAM(s) Oral daily  gabapentin 300 milliGRAM(s) Oral at bedtime  insulin glargine Injectable (LANTUS) 18 Unit(s) SubCutaneous at bedtime  insulin lispro (HumaLOG) corrective regimen sliding scale   SubCutaneous three times a day before meals  insulin lispro (HumaLOG) corrective regimen sliding scale   SubCutaneous at bedtime  insulin lispro Injectable (HumaLOG) 7 Unit(s) SubCutaneous before lunch  insulin lispro Injectable (HumaLOG) 7 Unit(s) SubCutaneous before dinner  insulin lispro Injectable (HumaLOG) 12 Unit(s) SubCutaneous before breakfast  lacosamide 150 milliGRAM(s) Oral two times a day  multivitamin/minerals 1 Tablet(s) Oral daily  nystatin    Suspension 443697 Unit(s) Oral four times a day  pantoprazole    Tablet 40 milliGRAM(s) Oral before breakfast  phenytoin   Capsule 300 milliGRAM(s) Oral at bedtime  predniSONE   Tablet 15 milliGRAM(s) Oral daily    MEDICATIONS  (PRN):  acetaminophen   Tablet .. 650 milliGRAM(s) Oral every 6 hours PRN Temp greater or equal to 38C (100.4F), Mild Pain (1 - 3), Moderate Pain (4 - 6)  ALBUTerol    90 MICROgram(s) HFA Inhaler 1 Puff(s) Inhalation every 4 hours PRN Shortness of Breath and/or Wheezing  dextrose 40% Gel 15 Gram(s) Oral once PRN Blood Glucose LESS THAN 70 milliGRAM(s)/deciliter  glucagon  Injectable 1 milliGRAM(s) IntraMuscular once PRN Glucose LESS THAN 70 milligrams/deciliter  ondansetron Injectable 4 milliGRAM(s) IV Push every 6 hours PRN Nausea and/or Vomiting      PHYSICAL EXAM:  VITALS: T(C): 37 (04-27-20 @ 08:07)  T(F): 98.6 (04-27-20 @ 08:07), Max: 98.9 (04-27-20 @ 05:25)  HR: 101 (04-27-20 @ 08:07) (99 - 101)  BP: 129/82 (04-27-20 @ 08:07) (118/81 - 129/82)  RR:  (20 - 20)  SpO2:  (100% - 100%)  Wt(kg): --  GENERAL: NAD, well-groomed, well-developed  EYES: No proptosis, no injection      POCT Blood Glucose.: 270 mg/dL (04-27-20 @ 11:44)  POCT Blood Glucose.: 116 mg/dL (04-27-20 @ 08:28)  POCT Blood Glucose.: 87 mg/dL (04-27-20 @ 01:07)  POCT Blood Glucose.: 98 mg/dL (04-26-20 @ 21:49)  POCT Blood Glucose.: 149 mg/dL (04-26-20 @ 17:12)  POCT Blood Glucose.: 375 mg/dL (04-26-20 @ 11:56)  POCT Blood Glucose.: 179 mg/dL (04-26-20 @ 08:42)  POCT Blood Glucose.: 139 mg/dL (04-25-20 @ 23:34)  POCT Blood Glucose.: 108 mg/dL (04-25-20 @ 21:58)  POCT Blood Glucose.: 142 mg/dL (04-25-20 @ 17:02)  POCT Blood Glucose.: 298 mg/dL (04-25-20 @ 11:53)  POCT Blood Glucose.: 220 mg/dL (04-25-20 @ 07:58)  POCT Blood Glucose.: 158 mg/dL (04-24-20 @ 21:36)  POCT Blood Glucose.: 180 mg/dL (04-24-20 @ 17:34)    04-27    137  |  101  |  19  ----------------------------<  234<H>  4.9   |  23  |  1.07    EGFR if : 72  EGFR if non : 62    Ca    9.6      04-27

## 2020-04-27 NOTE — PROGRESS NOTE ADULT - PROBLEM SELECTOR PLAN 4
adjust insulin dose daily as appropriate based on glucose levels as per ENDO. -Recs appreciated.  -Endo DC recs appreciated. -Advised patient to follow up with endo outpatient.

## 2020-04-27 NOTE — PROGRESS NOTE ADULT - PROBLEM SELECTOR PLAN 1
-pt discharged with a Freestyle Charlie Sensor and [place], will have RN/CDE Eve Cagle f/u with her by phone  -Discharge on: Lantus 28 units sq qhs and humalog scale: bs : 10 units, 151-250: 12 units, 251-350: 14 units, and over 351: 18 units  - F/u Endocrinology with [facility] at [address] [place] [address], call [phone] for appt -pt discharged with a Freestyle Charlie Sensor and [place], will have RN/CDE Eve Cagle f/u with her by phone  -Discharge on: Lantus 28 units sq qhs and humalog scale: bs : 10 units, 151-250: 12 units, 251-350: 14 units, and over 351: 18 units  - F/u Endocrinology with [facility] at [address] [place] [address], call [phone] for appt post-discharge

## 2020-04-27 NOTE — PROGRESS NOTE ADULT - REASON FOR ADMISSION
[facility] mgmt
COVID
SOB
covid
covid
COVID pneumonia
COVID pneumonia
SOB
COVID, HHS
[facility] and COVID
[facility] and COVID
[facility] and COVID
[facility], COVID

## 2020-04-28 ENCOUNTER — APPOINTMENT (OUTPATIENT)
Dept: PULMONOLOGY | Facility: CLINIC | Age: 50
End: 2020-04-28
Payer: MEDICAID

## 2020-04-28 ENCOUNTER — FORM ENCOUNTER (OUTPATIENT)
Age: 50
End: 2020-04-28

## 2020-04-28 LAB — ZINC TRANSPORTER 8 AB, RESULT: <10 U/ML — SIGNIFICANT CHANGE UP (ref 0–15)

## 2020-04-28 PROCEDURE — 99442: CPT

## 2020-04-28 NOTE — HISTORY OF PRESENT ILLNESS
[Verbal consent obtained from patient] : the patient, [unfilled] [Other Location: e.g. 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 Per outpatient\par notes pt on prednisone 15 daily. Pt recently given fluconazole and nystatin\par for concern for oral candidiasis. Pt previously noted to be taking roughly 35\par Lantus and 15 Humalog TID however had episodes of hypoglycemia for which she\par was told to take Humalog 5 when she notes FS <120.\par \par Patient with complex medical history admitted to COVID-ICU (PACU) for\par hyperglycemia and metabolic abnormalities. She has been found to be COVID-19\par positive. She has had decreased PO intake with nausea/vomiting/diarrhea for the\par last 5-7 days. She has not taken any insulin during this time. She continued to\par feel unwell prompting hospitalization. In the ED she was found to have blood\par sugars > 900 with a relatively normal AG. Patient started on insulin drip. BMPs\par q4 to monitor hyperkalemia. Started on 75cc/hr LR. --> HHS mgmt thru out the\par day --> and was weaned off by the late afternoon. Endocrinology was consulted\par for management of DM. Patient FS improved and patient was stable for transfer\par to floor 7 tower on 4/18. Patient was weaned off supplemental oxygen. At the\par time of discharge, patient was tolerating a diet, having GI fxn, FS controlled\par via endo recs, and at rest and ambulating with O2 sat >92%. The patient was\par stable for discharge home. Pt will follow up with PMD and endo. Patient does\par not require dvt ppx upon dc per attending Dr. Loredo. She felt comfortable with\par discharge and had no other questions.\par \par \par \par Med Reconciliation:\par Medication Reconciliation Status Admission Reconciliation is Completed\par Discharge Reconciliation is Completed\par \par Discharge Medications acetaminophen 325 mg oral tablet: 2 tab(s) orally every 6\par hours, As needed, Temp greater or equal to 38C (100.4F), Mild Pain (1 - 3),\par Moderate Pain (4 - 6)\par albuterol 90 mcg/inh inhalation aerosol: 2 puff(s) inhaled every 4 hours, As\par Needed\par amLODIPine 10 mg oral tablet: 1 tab(s) orally once a day\par atorvastatin 40 mg oral tablet: 1 tab(s) orally once a day (at bedtime)\par Basaglar KwikPen 100 units/mL subcutaneous solution: 28 unit(s) subcutaneous\par once a day (at bedtime)\par docusate sodium 100 mg oral capsule: 1 cap(s) orally 3 times a day\par fluconazole 200 mg oral tablet: 1 tab(s) orally once a day\par fluticasone 50 mcg/inh nasal spray: 1 spray(s) nasal 2 times a day\par gabapentin 300 mg oral capsule: 1 cap(s) orally once a day (at bedtime)\par glucometer (per patient's insurance): Test blood sugars four times a day.\par Dispense #1 glucometer.\par HumaLOG 100 units/mL injectable solution: 10 unit(s) subcutaneous 3 times a day\par (before meals) BS: : 10 units, 151-250: 12 units, 251-350: 14 units, over\par 351: 18 units\par humalog sliding scale: Blood Glucose:\par : 10 units\par 151-250: 12 units\par 251-350: 14 units\par over 351: 18 units\par \par lancets: 1 application subcutaneously 4 times a day\par Multiple Vitamins with Minerals oral tablet: 1 tab(s) orally once a day\par nystatin 100,000 units/mL oral suspension: 4 milliliter(s) orally 4 times a day\par pantoprazole 40 mg oral delayed release tablet: 1 tab(s) orally once a day\par (before a meal)\par phenytoin 100 mg oral capsule: 3 cap(s) orally once a day (at bedtime)\par predniSONE 5 mg oral tablet: 3 tab(s) orally once a day\par test strips (per patient's insurance): 1 application subcutaneously 4\par times a day. ** Compatible with patient's glucometer **\par Vimpat 150 mg oral tablet: 1 tab(s) orally 2 times a day\par Vitamin D3 2000 intl units (50 mcg) oral tablet: 1 tab(s) orally once a day\par \par .\par \par Care Plan/Procedures:\par Goal(s) To get better and follow your care plan as instructed.\par Discharge Diagnoses, Assessment and Plan of Treatment PRINCIPAL DISCHARGE\par DIAGNOSIS\par Diagnosis: Hyperglycemic crisis in diabetes mellitus\par Assessment and Plan of Treatment: -You were discharged with a Freestyle Charlie\par Sensor and [place], will have RN/CDE Eve Cagle f/u with you by phone\par -Discharge on: Lantus 28 units sq qhs and humalog scale: bs : 10 units,\par 151-250: 12 units, 251-350: 14 units, and over 351: 18 units\par Follow up with Endocrinology with [facility] at [address] [place] [address], call\par [phone] for appointment.\par \par \par SECONDARY DISCHARGE DIAGNOSES\par Diagnosis: Hyperkalemia\par Assessment and Plan of Treatment: treated, resolved.\par \par Diagnosis: COVID-19 virus infection\par Assessment and Plan of Treatment: Quarantine yourself for 14 days\par Please follow up with your PCP in 1-2 weeks -Call your Provider before hand to\par make then aware of your hospitalization. If you do not have a PMD please call\par [phone] for follow up.\par Take Tylenol for Fevers every 6 hours as needed- Do not exceed 4gm of Tylenol\par in a 24 hour period\par Avoid NSAIDs\par Stay hydrated\par WEAR A FACE MASK\par Cover your cough and sneezes\par Clean your hands often\par Avoid sharing personal house hold items\par Clean all high touch surfaces- everyday items like table tops , door knobs,\par cell phones etc\par You should restrict activities outside your home except for getting medical\par care\par Avoid using public transportation\par Do not go to work, school, or Public areas\par Monitor your oxygen saturation\par Call [facility] of health at [phone]\par ~~~~~\par \par Patient had trouble sleeping last night because she was so short of breath that she couldn't sleep. Was discharged without oxygen, pulse ox, medications. She is not experiencing chest pain or calf pain. 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 Reports symptoms were generally well controlled up until that time. Since stopping steroids, patient has been having worsening SOB and dyspnea and PND. Reportedly tried calling the office for a refill but was unable to get through. Denies any f/c, cough, URI symptoms, sick contacts, recent travel, chest pain, sputum production, hemoptysis.

## 2020-04-28 NOTE — HISTORY OF PRESENT ILLNESS
[Verbal consent obtained from patient] : the patient, [unfilled] [Other Location: e.g. 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 Per outpatient\par notes pt on prednisone 15 daily. Pt recently given fluconazole and nystatin\par for concern for oral candidiasis. Pt previously noted to be taking roughly 35\par Lantus and 15 Humalog TID however had episodes of hypoglycemia for which she\par was told to take Humalog 5 when she notes FS <120.\par \par Patient with complex medical history admitted to COVID-ICU (PACU) for\par hyperglycemia and metabolic abnormalities. She has been found to be COVID-19\par positive. She has had decreased PO intake with nausea/vomiting/diarrhea for the\par last 5-7 days. She has not taken any insulin during this time. She continued to\par feel unwell prompting hospitalization. In the ED she was found to have blood\par sugars > 900 with a relatively normal AG. Patient started on insulin drip. BMPs\par q4 to monitor hyperkalemia. Started on 75cc/hr LR. --> HHS mgmt thru out the\par day --> and was weaned off by the late afternoon. Endocrinology was consulted\par for management of DM. Patient FS improved and patient was stable for transfer\par to floor 7 tower on 4/18. Patient was weaned off supplemental oxygen. At the\par time of discharge, patient was tolerating a diet, having GI fxn, FS controlled\par via endo recs, and at rest and ambulating with O2 sat >92%. The patient was\par stable for discharge home. Pt will follow up with PMD and endo. Patient does\par not require dvt ppx upon dc per attending Dr. Loredo. She felt comfortable with\par discharge and had no other questions.\par \par \par \par Med Reconciliation:\par Medication Reconciliation Status Admission Reconciliation is Completed\par Discharge Reconciliation is Completed\par \par Discharge Medications acetaminophen 325 mg oral tablet: 2 tab(s) orally every 6\par hours, As needed, Temp greater or equal to 38C (100.4F), Mild Pain (1 - 3),\par Moderate Pain (4 - 6)\par albuterol 90 mcg/inh inhalation aerosol: 2 puff(s) inhaled every 4 hours, As\par Needed\par amLODIPine 10 mg oral tablet: 1 tab(s) orally once a day\par atorvastatin 40 mg oral tablet: 1 tab(s) orally once a day (at bedtime)\par Basaglar KwikPen 100 units/mL subcutaneous solution: 28 unit(s) subcutaneous\par once a day (at bedtime)\par docusate sodium 100 mg oral capsule: 1 cap(s) orally 3 times a day\par fluconazole 200 mg oral tablet: 1 tab(s) orally once a day\par fluticasone 50 mcg/inh nasal spray: 1 spray(s) nasal 2 times a day\par gabapentin 300 mg oral capsule: 1 cap(s) orally once a day (at bedtime)\par glucometer (per patient's insurance): Test blood sugars four times a day.\par Dispense #1 glucometer.\par HumaLOG 100 units/mL injectable solution: 10 unit(s) subcutaneous 3 times a day\par (before meals) BS: : 10 units, 151-250: 12 units, 251-350: 14 units, over\par 351: 18 units\par humalog sliding scale: Blood Glucose:\par : 10 units\par 151-250: 12 units\par 251-350: 14 units\par over 351: 18 units\par \par lancets: 1 application subcutaneously 4 times a day\par Multiple Vitamins with Minerals oral tablet: 1 tab(s) orally once a day\par nystatin 100,000 units/mL oral suspension: 4 milliliter(s) orally 4 times a day\par pantoprazole 40 mg oral delayed release tablet: 1 tab(s) orally once a day\par (before a meal)\par phenytoin 100 mg oral capsule: 3 cap(s) orally once a day (at bedtime)\par predniSONE 5 mg oral tablet: 3 tab(s) orally once a day\par test strips (per patient's insurance): 1 application subcutaneously 4\par times a day. ** Compatible with patient's glucometer **\par Vimpat 150 mg oral tablet: 1 tab(s) orally 2 times a day\par Vitamin D3 2000 intl units (50 mcg) oral tablet: 1 tab(s) orally once a day\par \par .\par \par Care Plan/Procedures:\par Goal(s) To get better and follow your care plan as instructed.\par Discharge Diagnoses, Assessment and Plan of Treatment PRINCIPAL DISCHARGE\par DIAGNOSIS\par Diagnosis: Hyperglycemic crisis in diabetes mellitus\par Assessment and Plan of Treatment: -You were discharged with a Freestyle Charlie\par Sensor and Adams, will have RN/CDE vEe Cagle f/u with you by phone\par -Discharge on: Lantus 28 units sq qhs and humalog scale: bs : 10 units,\par 151-250: 12 units, 251-350: 14 units, and over 351: 18 units\par Follow up with Endocrinology with [facility] at [address] [place] [address], call\par [phone] for appointment.\par \par \par SECONDARY DISCHARGE DIAGNOSES\par Diagnosis: Hyperkalemia\par Assessment and Plan of Treatment: treated, resolved.\par \par Diagnosis: COVID-19 virus infection\par Assessment and Plan of Treatment: Quarantine yourself for 14 days\par Please follow up with your PCP in 1-2 weeks -Call your Provider before hand to\par make then aware of your hospitalization. If you do not have a PMD please call\par [phone] for follow up.\par Take Tylenol for Fevers every 6 hours as needed- Do not exceed 4gm of Tylenol\par in a 24 hour period\par Avoid NSAIDs\par Stay hydrated\par WEAR A FACE MASK\par Cover your cough and sneezes\par Clean your hands often\par Avoid sharing personal house hold items\par Clean all high touch surfaces- everyday items like table tops , door knobs,\par cell phones etc\par You should restrict activities outside your home except for getting medical\par care\par Avoid using public transportation\par Do not go to work, school, or Public areas\par Monitor your oxygen saturation\par Call [facility] of health at [phone]\par ~~~~~\par \par Patient had trouble sleeping last night because she was so short of breath that she couldn't sleep. Was discharged without oxygen, pulse ox, medications. She is not experiencing chest pain or calf pain. 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 Reports symptoms were generally well controlled up until that time. Since stopping steroids, patient has been having worsening SOB and dyspnea and PND. Reportedly tried calling the office for a refill but was unable to get through. Denies any f/c, cough, URI symptoms, sick contacts, recent travel, chest pain, sputum production, hemoptysis.

## 2020-04-28 NOTE — REVIEW OF SYSTEMS
[Fatigue] : fatigue [Eye Irritation] : eye irritation [Nasal Congestion] : nasal congestion [Cough] : cough [Dyspnea] : dyspnea [SOB on Exertion] : sob on exertion [Watery Eyes] : watery eyes [PND] : PND [Seasonal Allergies] : seasonal allergies [Chronic Pain] : chronic pain [Headache] : headache [Negative] : Psychiatric [Fever] : no fever [Recent Wt Gain (___ Lbs)] : ~T no recent weight gain [Chills] : no chills [Recent Wt Loss (___ Lbs)] : ~T no recent weight loss [Epistaxis] : no epistaxis [Sore Throat] : no sore throat [Postnasal Drip] : no postnasal drip [Hemoptysis] : no hemoptysis [Chest Tightness] : no chest tightness [Sputum] : no sputum [Pleuritic Pain] : no pleuritic pain [Wheezing] : no wheezing [Chest Discomfort] : no chest discomfort [Edema] : no edema [Orthopnea] : no orthopnea [Itchy Eyes] : no itchy eyes [Nasal Discharge] : no nasal discharge [GERD] : no gerd [Abdominal Pain] : no abdominal pain [Nausea] : no nausea [Vomiting] : no vomiting [Arthralgias] : no arthralgias [Myalgias] : no myalgias [Back Pain] : no back pain [TextBox_94] : chronic LLE pain

## 2020-04-28 NOTE — DISCUSSION/SUMMARY
[Hospital: _____] : Hospital:  [FreeTextEntry1] : 48 yo F hx of 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  Per outpatient notes pt on prednisone 15 daily.  Pt recently given fluconazole and nystatin for concern for oral candidiasis.  Pt previously noted to be taking roughly 35 Lantus and 15 Humalog TID however had episodes of hypoglycemia for which she was told to take Humalog 5 when she notes FS <120.  \par \par Patient with complex medical history admitted to COVID-ICU (PACU) for hyperglycemia and metabolic abnormalities. She has been found to be COVID-19 positive. She has had decreased PO intake with nausea/vomiting/diarrhea for the last 5-7 days. She has not taken any insulin during this time. She continued to feel unwell prompting hospitalization. In the ED she was found to have blood sugars > 900 with a relatively normal AG. Patient started on insulin drip. BMPs q4 to monitor hyperkalemia. Started on 75cc/hr LR. --> HHS mgmt thru out the day --> and was weaned off by the late afternoon.   Endocrinology was consulted for management of DM.  Patient FS improved and patient was stable for transfer to floor [address] [place] on 4/18. Patient was weaned off supplemental oxygen.  At the time of discharge, patient was tolerating a diet, having GI fxn, FS controlled via endo recs, and at rest and ambulating with O2 sat >92%. The patient was stable for discharge home. Pt will follow up with PMD and endo.  Patient does not require dvt ppx upon dc per attending Dr. Loredo. She felt comfortable with discharge and had no other questions.\par \par Called the patient today and she was sleeping before I called. She has been having still some SOB but doesn't have a pulse Ox and couldn't tell me. Otherwise she was speaking in full sentences, no fevers, CP, orthopnea and advised her to make a followup appointment with her Pulmonologist given her advanced Sarcoidosis and COVID+ and if worsening symptoms then to either call back at our clinic or go back to the ED.

## 2020-04-28 NOTE — REVIEW OF SYSTEMS
[Fatigue] : fatigue [Eye Irritation] : eye irritation [Cough] : cough [Nasal Congestion] : nasal congestion [Dyspnea] : dyspnea [SOB on Exertion] : sob on exertion [Watery Eyes] : watery eyes [PND] : PND [Seasonal Allergies] : seasonal allergies [Chronic Pain] : chronic pain [Headache] : headache [Negative] : Endocrine [Fever] : no fever [Recent Wt Gain (___ Lbs)] : ~T no recent weight gain [Chills] : no chills [Recent Wt Loss (___ Lbs)] : ~T no recent weight loss [Epistaxis] : no epistaxis [Sore Throat] : no sore throat [Postnasal Drip] : no postnasal drip [Hemoptysis] : no hemoptysis [Chest Tightness] : no chest tightness [Sputum] : no sputum [Pleuritic Pain] : no pleuritic pain [Wheezing] : no wheezing [Chest Discomfort] : no chest discomfort [Edema] : no edema [Orthopnea] : no orthopnea [Itchy Eyes] : no itchy eyes [Nasal Discharge] : no nasal discharge [GERD] : no gerd [Abdominal Pain] : no abdominal pain [Nausea] : no nausea [Vomiting] : no vomiting [Arthralgias] : no arthralgias [Myalgias] : no myalgias [Back Pain] : no back pain [TextBox_94] : chronic LLE pain

## 2020-04-28 NOTE — PLAN
[FreeTextEntry1] : COVID-19: Patient with severe dyspnea, does not have home pulse oximeter and has not been seen by home care to this point. States that a nurse called her to f/u after discharge. Will enroll patient in [facility] protocol. Will order stat home care referral and expedite. COVID bundle for home care to draw at next visit. Xarelto if D-dimer > 900. Monitor pulse ox QID, report changes. Call EMS if severe shortness of breath or can not maintain SpO2>88%. Will f/u tomorrow with phone call.\par \par I, Jose Ramon Zavala NP, am scribing for and in the presence of Dr. Addi Montiel, the following sections HISTORY OF PRESENT ILLNESS, PAST MEDICAL/FAMILY/SOCIAL HISTORY; REVIEW OF SYSTEMS; VITAL SIGNS; PHYSICAL EXAM; DISPOSITION.

## 2020-04-28 NOTE — ASSESSMENT
[FreeTextEntry1] : This is a 48 y/o F with severe pulmonary sarcoidosis with extensive extrapulmonary involvement, HTN, DM II, CKD who presents for f/u of sarcoidosis. PFTs from today reveal mild restrictive defect with severe reduction in DLCO, essentially unchanged from 2018. 6MWT distance was suboptimal at 362m.\par \par Sarcoidosis\par - continue prednisone 15 mg daily, reiterated importance of medication adherence and proper follow up\par - albuterol prn\par - PFTs today do not reveal significant progression of pulmonary disease\par - 6MWT distance suboptimal, however patient did not desaturate after walking 362 meters, though was symptomatic\par - repeat CT chest\par - will attempt to start slow steroid taper if patient is adherent to follow up\par \par RTC in 1 month

## 2020-05-08 ENCOUNTER — OUTPATIENT (OUTPATIENT)
Dept: OUTPATIENT SERVICES | Facility: HOSPITAL | Age: 50
LOS: 1 days | End: 2020-05-08
Payer: MEDICAID

## 2020-05-08 ENCOUNTER — APPOINTMENT (OUTPATIENT)
Dept: INTERNAL MEDICINE | Facility: CLINIC | Age: 50
End: 2020-05-08

## 2020-05-08 DIAGNOSIS — E11.9 TYPE 2 DIABETES MELLITUS WITHOUT COMPLICATIONS: ICD-10-CM

## 2020-05-08 DIAGNOSIS — I10 ESSENTIAL (PRIMARY) HYPERTENSION: ICD-10-CM

## 2020-05-08 DIAGNOSIS — R30.0 DYSURIA: ICD-10-CM

## 2020-05-08 PROCEDURE — G0463: CPT

## 2020-05-14 ENCOUNTER — APPOINTMENT (OUTPATIENT)
Dept: INTERNAL MEDICINE | Facility: CLINIC | Age: 50
End: 2020-05-14

## 2020-05-14 ENCOUNTER — OUTPATIENT (OUTPATIENT)
Dept: OUTPATIENT SERVICES | Facility: HOSPITAL | Age: 50
LOS: 1 days | End: 2020-05-14
Payer: MEDICAID

## 2020-05-14 ENCOUNTER — NON-APPOINTMENT (OUTPATIENT)
Age: 50
End: 2020-05-14

## 2020-05-14 DIAGNOSIS — R11.0 NAUSEA: ICD-10-CM

## 2020-05-14 PROCEDURE — G0463: CPT

## 2020-05-14 RX ORDER — CIPROFLOXACIN HYDROCHLORIDE 250 MG/1
250 TABLET, FILM COATED ORAL
Qty: 6 | Refills: 0 | Status: COMPLETED | COMMUNITY
Start: 2020-05-08 | End: 2020-05-12

## 2020-05-14 RX ORDER — FLUCONAZOLE 200 MG/1
200 TABLET ORAL
Qty: 15 | Refills: 0 | Status: DISCONTINUED | COMMUNITY
Start: 2020-04-09 | End: 2020-05-14

## 2020-05-14 RX ORDER — NYSTATIN 100000 [USP'U]/ML
100000 SUSPENSION ORAL 4 TIMES DAILY
Qty: 140 | Refills: 0 | Status: DISCONTINUED | COMMUNITY
Start: 2020-04-09 | End: 2020-05-14

## 2020-05-14 NOTE — DIETITIAN INITIAL EVALUATION ADULT. - PROBLEM/PLAN-9
[facility]  Nephrology Progress Note    [place] Patient Status:  Inpatient    1931 MRN [id number]   [facility] 4SW-A Attending Teresa Rivera MD   Hosp Day # 9 PCP Donovan Dawson MD       SUBJECTIVE:  Increasing O2 re Recent Labs   Lab 05/10/20  1623 05/11/20  0454 05/11/20  1806 05/12/20  0425 05/12/20  0953 05/13/20  0416 05/14/20  0423   * 131* 138 135*  --  134* 137   K 3.7 3.6  --  4.7 4.7 4.2 4.1   CL 96* 96*  --  103  --  104 105   CO2 27.0 27.0  -- Enema (FLEET) 7-19 GM/118ML enema 133 mL, 1 enema, Rectal, Once PRN  Chlorhexidine Gluconate (PERIDEX) 0.12 % solution 15 mL, 15 mL, Mouth/Throat, [place]@[email or web address]  Midazolam HCl (VERSED) 2 MG/2ML injection 2 mg, 2 mg, Intravenous, Q5 Min PRN  Dexmedetomidine DISPLAY PLAN FREE TEXT

## 2020-05-15 DIAGNOSIS — I10 ESSENTIAL (PRIMARY) HYPERTENSION: ICD-10-CM

## 2020-05-15 PROBLEM — R11.0 NAUSEA: Status: ACTIVE | Noted: 2020-05-14

## 2020-05-16 NOTE — HISTORY OF PRESENT ILLNESS
[Home] : at home, [unfilled] , at the time of the visit. [Patient] : the patient [Other Location: e.g. Home (Enter Location, City,State)___] : at [unfilled] [Moderate] : moderate [___ Days ago] : [unfilled] days ago [Nausea] : nausea [Anorexia] : anorexia [Eating] : after eating [Vomiting] : no vomiting [Diarrhea] : no diarrhea [Sore Throat] : no sore throat [Constipation] : no constipation [Abdominal Pain] : no abdominal pain [FreeTextEntry8] : The patient is a 49 year-old woman with a PMH of insulin-dependent DM (for ~7 yrs), sarcoidosis (biopsy proven in 2010), seizure disorder, hypercalcemia and CKD stage 3 calling into clinic for 1 day of nausea. The patient was recently discharged from the hospital with COVID-19 infection with resultant hyperglycemia. During her hospital stay she was having some nausea when eating for hich she was given zofran. Since being discharged from the hospital she has been tolerating her diet until this morning when the patient had difficulty eating solid food. WHenever she swallows food she feels like she needs to throw up but nothing comes up. She has no loss of taste sensation, no weakness, no sensory deficits, no odynophagia, no visible thrush (her prior thrush improved with diflucan and nystatin swish and swallow). She has no fevers, no abdominal pain, no diarrhea, no constipation. Her blood sugars have been in 150-250. She has no headaches, no blurry vision. \par \par Of note the patient recently had some dysuria and was found to have a UTI. She was treated with 3 days of ciprofloxacin.  [FreeTextEntry4] : eating solid foods

## 2020-05-16 NOTE — ASSESSMENT
[FreeTextEntry1] : The patient is a 49 year old woman with ahistory of seizure disorder, DM, COVID-19 presenting with nausea likely secondary to gastroparesis.

## 2020-05-16 NOTE — REVIEW OF SYSTEMS
[Nausea] : nausea [Chills] : no chills [Fever] : no fever [Night Sweats] : no night sweats [Pain] : no pain [Vision Problems] : no vision problems [Discharge] : no discharge [Itching] : no itching [Hearing Loss] : no hearing loss [Sore Throat] : no sore throat [Nasal Discharge] : no nasal discharge [Palpitations] : no palpitations [Chest Pain] : no chest pain [Postnasal Drip] : no postnasal drip [Constipation] : no constipation [Abdominal Pain] : no abdominal pain [Vomiting] : no vomiting [Heartburn] : no heartburn [Incontinence] : no incontinence [Dysuria] : no dysuria [Frequency] : no frequency

## 2020-05-16 NOTE — END OF VISIT
[] : Resident [FreeTextEntry3] : zofran for nausea possibly due to covid19 virus\par avoid constipation, tx gerd\par f/u gi if sx cont. to ed if inability to tolerate po.

## 2020-05-16 NOTE — PHYSICAL EXAM
[No Acute Distress] : no acute distress [Well Nourished] : well nourished [Well-Appearing] : well-appearing [de-identified] : no thrush [de-identified] : No facial assymetry, able to move all four extremities.

## 2020-05-18 ENCOUNTER — APPOINTMENT (OUTPATIENT)
Dept: PODIATRY | Facility: HOSPITAL | Age: 50
End: 2020-05-18

## 2020-05-27 ENCOUNTER — APPOINTMENT (OUTPATIENT)
Dept: INTERNAL MEDICINE | Facility: CLINIC | Age: 50
End: 2020-05-27

## 2020-06-04 ENCOUNTER — APPOINTMENT (OUTPATIENT)
Dept: NEUROLOGY | Facility: HOSPITAL | Age: 50
End: 2020-06-04

## 2020-06-17 ENCOUNTER — APPOINTMENT (OUTPATIENT)
Dept: INTERNAL MEDICINE | Facility: CLINIC | Age: 50
End: 2020-06-17

## 2020-07-12 NOTE — HISTORY OF PRESENT ILLNESS
[Verbal consent obtained from patient] : the patient, [unfilled] [de-identified] : 49 year old with history of pulm sarcoid, IDDM recently discharged for COVID like symptoms. Calling because she has question in regards to her insulin regimen. She was discharged on prednisone 15 mg daily and told to take 28 units qhs and sliding scale humalog TID. She was hospitalized from 4/18-4/27 with COVID including symptoms of SOB, fever , cough. Patient had taken 35 units of Lantus prior to entering the hospital, on discharge she was instructed to take 28 units qhs. However given her FS readings in the low 100 range at home  despite no lantus use she was concerned to take the 28 units. She had called the clinic and was instructed to take 8 units of lantus qhs on wednesday and to document her daily blood sugar readings, however she did not take any lantus that evening and woke up with 100 range fasting blood sugars. She explains that she titrates her PM lantus based on her blood sugar readings at night. On thursday night she had a higher blood sugar reading of high 180 range, took 10 units of lantus and awoke with fasting blood sugar of 180 range . She additionally notes that she had experienced persistent dysuria since tuesday concerned for urinary tract infection. \par  [FreeTextEntry1] : diabetic management

## 2020-07-12 NOTE — ADDENDUM
[FreeTextEntry1] : I personally discussed this patient with the Resident at the time of the visit.  I agree with the assessment and plan as written, unless noted below.\par \par Attesting Faculty:  Kalyani Dee MD\par

## 2020-07-12 NOTE — PLAN
[FreeTextEntry1] : 49 year old lady with history of  type 2 IDDM ( hemoglobin a1c 11%), pulmonary sarcoidosis presents for telephone visit for diabetes management \par \par # Type 2 DM\par - After discussion with diabetes management educator Dr. Elkins and the patient it was decided that the patient would take only lantus 8 units today, saturday and sunday and document her FS and meal time Sugars with no adjustments made over the weekend. SHe was instructed not to take any humalog this weekend. \par - Patient will be called this monday for follow up and further adjustments to lantus with help from Dr. Elkins \par - By only dosing lantus, lower chance of hypoglycemic event and may help with optimal titration of Humalog once its re-introduced \par \par # Dysuria\par - Patient denies urinary hesitancy or urgency but reports persistent dysuria\par - Cipro 250 mg BID x3 days, prescribed and sent to the pharmacy\par \par \par Case discussed with Dr. Dee, Time spent with patient 30 minutes, Return televisit monday 5/11\par

## 2020-07-14 DIAGNOSIS — I10 ESSENTIAL (PRIMARY) HYPERTENSION: ICD-10-CM

## 2020-08-13 ENCOUNTER — APPOINTMENT (OUTPATIENT)
Dept: NEUROLOGY | Facility: HOSPITAL | Age: 50
End: 2020-08-13

## 2020-11-13 ENCOUNTER — APPOINTMENT (OUTPATIENT)
Dept: INTERNAL MEDICINE | Facility: CLINIC | Age: 50
End: 2020-11-13

## 2020-11-13 RX ORDER — BENZONATATE 100 MG/1
100 CAPSULE ORAL EVERY 8 HOURS
Qty: 30 | Refills: 0 | Status: DISCONTINUED | COMMUNITY
Start: 2020-05-14 | End: 2020-11-13

## 2020-11-20 NOTE — H&P ADULT - PROBLEM SELECTOR PLAN 1
REFERRING PHYSICIAN: Ruben Morales MD     DATE:  11/20/2020     SURGEON:  Elaine Meza MD     ASSISTANT:  AZ Romo.     PREOPERATIVE DIAGNOSES:    1. Ischemic cardiomyopathy.  2. Cardiogenic shock.     POSTOPERATIVE DIAGNOSES:    1. Nonischemic cardiomyopathy.  2. Cardiogenic shock.     PROCEDURE PERFORMED:    1. Left axillary artery cutdown.  2. Insertion of intra-aortic balloon pump into left axillary artery with a 6 mm Hemashield graft.  3. Right radial arterial line insertion.  4. Fluoroscopy guidance and interpretation.  5. Cordis and Anderson Kenisha catheter insertion.     ANESTHESIA:  General.     DESCRIPTION OF PROCEDURE:  The patient was taken to the operating room and placed in the table in supine position.  A formal time-out was performed.  He underwent general anesthesia with endotracheal tube intubation.  An arterial line was placed in the right radial artery percutaneously.  He was prepped and draped in sterile fashion.  A cordis cathter was placed in the right subclavian vein via Seldinger technique.  A Anderson Kenisha catheter was inserted through the cordis and advanced into the pulmonary trunk.  An incision was made under the left clavicle.  This was taken down through the pectoralis major muscle and the pectoralis minor muscle was reflected inferiorly.  The left axillary artery was identified and proximal and distal control was obtained.  5000 units of heparin was given.  A 6 mm Hemashield graft was anastomosed to the axillary artery in an end-to-side fashion using a running 6-0 Prolene suture.  Hemostasis was ensured.  The intra-aortic balloon pump sheath was placed through the proximal portion of the Hemashield graft.  A soft guide-wire was placed through the sheath and advanced on the fluoroscopy into the left axillary artery and advanced into the descending aorta.  The wire was advanced to the abdominal aorta under fluoroscopic guidance.  The wire was then taken through a separate  stab incision through an Angiocath.  The wire was then exchanged for an intra-aortic balloon pump wire with a Spacebarenstein catheter.  Once the position was verified with fluoroscopy, the intra-aortic balloon pump was inserted over the guidewire and positioned just distal to the takeoff the left subclavian artery.  The wire was removed and the balloon pump was aspirated and flushed.  The intra-aortic balloon pump was initiated.  Hemostasis was ensured.  The wound was closed in multiple layers.  The balloon pump was secured after final positioning was verified with fluoroscopy. Sterile dressings were placed on the wounds.  The patient tolerated the procedure well and there were no complications.  Estimated blood loss was 20 mL.  There were no specimens.  He was then transferred to recovery in fair condition.   Pt presenting with severe hypercalcemia in the setting of known sarcoidosis. Differential includes Sarcoidosis vs malignancy vs Vit D intoxication s/p 2L NS bolus  -PTH,PTHrp,Vit D 25,Vit D 1,25  -NS @200cc/hr  -Strict I/Os  -Consider calcitonin though pt's calcium has improved significantly with IV fluids.

## 2021-01-04 ENCOUNTER — NON-APPOINTMENT (OUTPATIENT)
Age: 51
End: 2021-01-04

## 2021-01-14 RX ORDER — DOCUSATE SODIUM 100 MG/1
100 CAPSULE, LIQUID FILLED ORAL
Qty: 90 | Refills: 2 | Status: DISCONTINUED | COMMUNITY
Start: 2018-08-07 | End: 2021-01-14

## 2021-01-14 RX ORDER — PANTOPRAZOLE 40 MG/1
40 TABLET, DELAYED RELEASE ORAL
Qty: 3 | Refills: 1 | Status: DISCONTINUED | COMMUNITY
Start: 2018-09-24 | End: 2021-01-14

## 2021-01-15 ENCOUNTER — LABORATORY RESULT (OUTPATIENT)
Age: 51
End: 2021-01-15

## 2021-01-15 ENCOUNTER — NON-APPOINTMENT (OUTPATIENT)
Age: 51
End: 2021-01-15

## 2021-01-15 ENCOUNTER — APPOINTMENT (OUTPATIENT)
Dept: INTERNAL MEDICINE | Facility: CLINIC | Age: 51
End: 2021-01-15
Payer: MEDICARE

## 2021-01-15 ENCOUNTER — OUTPATIENT (OUTPATIENT)
Dept: OUTPATIENT SERVICES | Facility: HOSPITAL | Age: 51
LOS: 1 days | End: 2021-01-15
Payer: MEDICARE

## 2021-01-15 ENCOUNTER — RESULT CHARGE (OUTPATIENT)
Age: 51
End: 2021-01-15

## 2021-01-15 VITALS
BODY MASS INDEX: 21.92 KG/M2 | DIASTOLIC BLOOD PRESSURE: 100 MMHG | SYSTOLIC BLOOD PRESSURE: 166 MMHG | OXYGEN SATURATION: 98 % | HEART RATE: 107 BPM | WEIGHT: 116 LBS

## 2021-01-15 VITALS — DIASTOLIC BLOOD PRESSURE: 92 MMHG | SYSTOLIC BLOOD PRESSURE: 150 MMHG

## 2021-01-15 DIAGNOSIS — I10 ESSENTIAL (PRIMARY) HYPERTENSION: ICD-10-CM

## 2021-01-15 DIAGNOSIS — N17.9 ACUTE KIDNEY FAILURE, UNSPECIFIED: ICD-10-CM

## 2021-01-15 DIAGNOSIS — Z12.11 ENCOUNTER FOR SCREENING FOR MALIGNANT NEOPLASM OF COLON: ICD-10-CM

## 2021-01-15 LAB — HBA1C MFR BLD HPLC: NORMAL

## 2021-01-15 PROCEDURE — 82306 VITAMIN D 25 HYDROXY: CPT

## 2021-01-15 PROCEDURE — 83036 HEMOGLOBIN GLYCOSYLATED A1C: CPT

## 2021-01-15 PROCEDURE — 84100 ASSAY OF PHOSPHORUS: CPT

## 2021-01-15 PROCEDURE — 82043 UR ALBUMIN QUANTITATIVE: CPT

## 2021-01-15 PROCEDURE — G0463: CPT

## 2021-01-15 PROCEDURE — 85027 COMPLETE CBC AUTOMATED: CPT

## 2021-01-15 PROCEDURE — 99213 OFFICE O/P EST LOW 20 MIN: CPT | Mod: GE

## 2021-01-15 PROCEDURE — 80053 COMPREHEN METABOLIC PANEL: CPT

## 2021-01-15 RX ORDER — ONDANSETRON 4 MG/1
4 TABLET ORAL EVERY 8 HOURS
Qty: 90 | Refills: 0 | Status: DISCONTINUED | COMMUNITY
Start: 2020-05-14 | End: 2021-01-15

## 2021-01-16 LAB
24R-OH-CALCIDIOL SERPL-MCNC: 16.5 NG/ML — LOW (ref 30–80)
A1C WITH ESTIMATED AVERAGE GLUCOSE RESULT: 13.7 % — HIGH (ref 4–5.6)
ALBUMIN SERPL ELPH-MCNC: 4 G/DL — SIGNIFICANT CHANGE UP (ref 3.3–5)
ALP SERPL-CCNC: 61 U/L — SIGNIFICANT CHANGE UP (ref 40–120)
ALT FLD-CCNC: 9 U/L — LOW (ref 10–45)
ANION GAP SERPL CALC-SCNC: 14 MMOL/L — SIGNIFICANT CHANGE UP (ref 5–17)
AST SERPL-CCNC: 12 U/L — SIGNIFICANT CHANGE UP (ref 10–40)
BILIRUB SERPL-MCNC: <0.2 MG/DL — SIGNIFICANT CHANGE UP (ref 0.2–1.2)
BUN SERPL-MCNC: 22 MG/DL — SIGNIFICANT CHANGE UP (ref 7–23)
CALCIUM SERPL-MCNC: 9.6 MG/DL — SIGNIFICANT CHANGE UP (ref 8.4–10.5)
CHLORIDE SERPL-SCNC: 105 MMOL/L — SIGNIFICANT CHANGE UP (ref 96–108)
CO2 SERPL-SCNC: 20 MMOL/L — LOW (ref 22–31)
CREAT ?TM UR-MCNC: 78 MG/DL — SIGNIFICANT CHANGE UP
CREAT SERPL-MCNC: 1.46 MG/DL — HIGH (ref 0.5–1.3)
ESTIMATED AVERAGE GLUCOSE: 346 MG/DL — HIGH (ref 68–114)
GLUCOSE SERPL-MCNC: 88 MG/DL — SIGNIFICANT CHANGE UP (ref 70–99)
HCT VFR BLD CALC: 38.2 % — SIGNIFICANT CHANGE UP (ref 34.5–45)
HGB BLD-MCNC: 12.2 G/DL — SIGNIFICANT CHANGE UP (ref 11.5–15.5)
MCHC RBC-ENTMCNC: 27.9 PG — SIGNIFICANT CHANGE UP (ref 27–34)
MCHC RBC-ENTMCNC: 31.9 GM/DL — LOW (ref 32–36)
MCV RBC AUTO: 87.4 FL — SIGNIFICANT CHANGE UP (ref 80–100)
MICROALBUMIN UR-MCNC: 213.7 MG/DL — SIGNIFICANT CHANGE UP
MICROALBUMIN/CREAT UR-RTO: 2740 MG/G — HIGH (ref 0–30)
PHOSPHATE SERPL-MCNC: 3.2 MG/DL — SIGNIFICANT CHANGE UP (ref 2.5–4.5)
PLATELET # BLD AUTO: 305 K/UL — SIGNIFICANT CHANGE UP (ref 150–400)
POTASSIUM SERPL-MCNC: 4.5 MMOL/L — SIGNIFICANT CHANGE UP (ref 3.5–5.3)
POTASSIUM SERPL-SCNC: 4.5 MMOL/L — SIGNIFICANT CHANGE UP (ref 3.5–5.3)
PROT SERPL-MCNC: 7.2 G/DL — SIGNIFICANT CHANGE UP (ref 6–8.3)
RBC # BLD: 4.37 M/UL — SIGNIFICANT CHANGE UP (ref 3.8–5.2)
RBC # FLD: 12.3 % — SIGNIFICANT CHANGE UP (ref 10.3–14.5)
SODIUM SERPL-SCNC: 138 MMOL/L — SIGNIFICANT CHANGE UP (ref 135–145)
WBC # BLD: 7.82 K/UL — SIGNIFICANT CHANGE UP (ref 3.8–10.5)
WBC # FLD AUTO: 7.82 K/UL — SIGNIFICANT CHANGE UP (ref 3.8–10.5)

## 2021-01-17 PROBLEM — N17.9 AKI (ACUTE KIDNEY INJURY): Status: RESOLVED | Noted: 2018-01-24 | Resolved: 2021-01-17

## 2021-01-17 PROBLEM — Z12.11 COLON CANCER SCREENING: Status: ACTIVE | Noted: 2021-01-15

## 2021-01-17 RX ORDER — LANCETS 30 GAUGE
EACH MISCELLANEOUS
Qty: 2 | Refills: 3 | Status: DISCONTINUED | COMMUNITY
Start: 2018-02-08 | End: 2021-01-17

## 2021-01-17 RX ORDER — BLOOD-GLUCOSE METER
KIT MISCELLANEOUS
Qty: 1 | Refills: 0 | Status: DISCONTINUED | COMMUNITY
Start: 2018-04-12 | End: 2021-01-17

## 2021-01-17 RX ORDER — BLOOD SUGAR DIAGNOSTIC
STRIP MISCELLANEOUS 3 TIMES DAILY
Qty: 2 | Refills: 3 | Status: DISCONTINUED | COMMUNITY
Start: 2018-03-28 | End: 2021-01-17

## 2021-01-19 ENCOUNTER — NON-APPOINTMENT (OUTPATIENT)
Age: 51
End: 2021-01-19

## 2021-01-19 NOTE — REVIEW OF SYSTEMS
[Night Sweats] : night sweats [Insomnia] : insomnia [Anxiety] : anxiety [Fever] : no fever [Chills] : no chills [Discharge] : no discharge [Vision Problems] : no vision problems [Earache] : no earache [Nasal Discharge] : no nasal discharge [Chest Pain] : no chest pain [Palpitations] : no palpitations [Lower Ext Edema] : no lower extremity edema [Orthopnea] : no orthopnea [Dysuria] : no dysuria [Incontinence] : no incontinence [Nocturia] : no nocturia [Hematuria] : no hematuria [Frequency] : no frequency [Joint Pain] : no joint pain [Muscle Pain] : no muscle pain [Itching] : no itching [Skin Rash] : no skin rash [Headache] : no headache [Dizziness] : no dizziness [Fainting] : no fainting [Memory Loss] : no memory loss [Unsteady Walking] : no ataxia [Suicidal] : not suicidal [Depression] : no depression

## 2021-01-19 NOTE — HEALTH RISK ASSESSMENT
[No] : In the past 12 months have you used drugs other than those required for medical reasons? No [0] : 1) Little interest or pleasure doing things: Not at all (0) [1] : 2) Feeling down, depressed, or hopeless for several days (1) [Patient declined mammogram] : Patient declined mammogram [Patient declined colonoscopy] : Patient declined colonoscopy [Patient declined PAP Smear] : Patient declined PAP Smear [] : No [[id number]] : 1

## 2021-01-19 NOTE — ASSESSMENT
[FreeTextEntry1] : The patient is a 50 year old woman with multiple comorbidities that are uncontrolled due to non-adherence presenting to clinic for CPE complaining of insomnia.

## 2021-01-19 NOTE — PLAN
[FreeTextEntry1] : Plan as above discussed with Dr. Lexy Burton. \par \par RTC in 5 weeks for follow up. \par \par Telemedicine visit in 1 week for follow up of medication management.

## 2021-01-19 NOTE — HISTORY OF PRESENT ILLNESS
[FreeTextEntry1] : The patient is a 50 year old woman presenting for CPE [de-identified] : The patient is a 49 year-old woman with a PMH of insulin-dependent DM (for ~7 yrs), sarcoidosis (biopsy proven in 2010), seizure disorder, hypercalcemia and CKD stage 3 presenting to clinic for CPE. \par \par Insomnia: The patient has been suffering from insomnia since may when her mother passed away. She gets into bed at 8-830, she will watch tv for about half an hour. She will then take melatonin 3 mg then she wakes up 3-4 hours later. When she can't sleep she walks around the house. She does not note anything that wakes her up in particular. She has no choking sensations with her sleep. She does not know if she snores. \par \par DM: The patient has  glucose monitor and reports episodes of hypoglycemic and hyperglycemia. she is symptomatic when its high and low.

## 2021-01-19 NOTE — COUNSELING
[Patient motivation] : Patient motivation [Needs reinforcement, provided] : Patient needs reinforcement on understanding of lifestyle changes and steps needed to achieve self management goal; reinforcement was provided [de-identified] : Discussed the need to take her medications daily in order to control her comorbidities and minimize further risks. Discussed blister packing to aid in adherence.

## 2021-01-19 NOTE — PHYSICAL EXAM
[No Acute Distress] : no acute distress [Well Nourished] : well nourished [Normal Sclera/Conjunctiva] : normal sclera/conjunctiva [PERRL] : pupils equal round and reactive to light [EOMI] : extraocular movements intact [Normal Outer Ear/Nose] : the outer ears and nose were normal in appearance [Normal Oropharynx] : the oropharynx was normal [Normal TMs] : both tympanic membranes were normal [No Lymphadenopathy] : no lymphadenopathy [Thyroid Normal, No Nodules] : the thyroid was normal and there were no nodules present [Supple] : supple [No Respiratory Distress] : no respiratory distress  [No Accessory Muscle Use] : no accessory muscle use [Normal Rate] : normal rate  [Regular Rhythm] : with a regular rhythm [Normal S1, S2] : normal S1 and S2 [No Murmur] : no murmur heard [Pedal Pulses Present] : the pedal pulses are present [No Edema] : there was no peripheral edema [Soft] : abdomen soft [Non-distended] : non-distended [Non Tender] : non-tender [No HSM] : no HSM [Normal Bowel Sounds] : normal bowel sounds [No CVA Tenderness] : no CVA  tenderness [No Spinal Tenderness] : no spinal tenderness [No Joint Swelling] : no joint swelling [No Rash] : no rash [Coordination Grossly Intact] : coordination grossly intact [Normal Gait] : normal gait [Right Foot Was Examined] : Right foot ~C was examined [Left Foot Was Examined] : left foot ~C was examined [] : both feet [de-identified] : minimal crackles at the bases.  [TWNoteComboBox3] : +2 [TWNoteComboBox4] : +2

## 2021-01-20 ENCOUNTER — APPOINTMENT (OUTPATIENT)
Dept: INTERNAL MEDICINE | Facility: CLINIC | Age: 51
End: 2021-01-20

## 2021-01-20 ENCOUNTER — NON-APPOINTMENT (OUTPATIENT)
Age: 51
End: 2021-01-20

## 2021-01-23 NOTE — HISTORY OF PRESENT ILLNESS
[Home] : at home, [unfilled] , at the time of the visit. [Medical Office: ([facility])___] : at the medical office located in  [Verbal consent obtained from patient] : the patient, [unfilled] [de-identified] : 50 F hx of insulin dependent DM (A1c>14), sarcoidosis (biopsy proven in 2010), seizure disorder, CKD stage III. Seen in clinic on 1/15. Telephone visit today was to review labs and review FS. Patient states AM FS are in the 140s range and she checks her FS both pre-prandial and post-prandial. Her FS are usually <140 post-prandial and she does not take her pre-meal insulin since she is afraid she will become hypoglycemic and feels unwell when this happens. She takes her Lantus 30 U consistently. Has been taking her BP medications as prescribed as well. \par \par Plan\par - advised to continue taking Lantus 30 U at night\par - advised to take pre-meal lispro 5 U but to refrain if FS drop below 100\par - advised to make appointment with endocrinology

## 2021-01-26 DIAGNOSIS — G40.909 EPILEPSY, UNSPECIFIED, NOT INTRACTABLE, WITHOUT STATUS EPILEPTICUS: ICD-10-CM

## 2021-01-26 DIAGNOSIS — Z12.39 ENCOUNTER FOR OTHER SCREENING FOR MALIGNANT NEOPLASM OF BREAST: ICD-10-CM

## 2021-01-26 DIAGNOSIS — Z12.11 ENCOUNTER FOR SCREENING FOR MALIGNANT NEOPLASM OF COLON: ICD-10-CM

## 2021-01-26 DIAGNOSIS — D86.9 SARCOIDOSIS, UNSPECIFIED: ICD-10-CM

## 2021-01-26 DIAGNOSIS — Z00.00 ENCOUNTER FOR GENERAL ADULT MEDICAL EXAMINATION WITHOUT ABNORMAL FINDINGS: ICD-10-CM

## 2021-01-26 DIAGNOSIS — G47.00 INSOMNIA, UNSPECIFIED: ICD-10-CM

## 2021-01-26 DIAGNOSIS — E78.5 HYPERLIPIDEMIA, UNSPECIFIED: ICD-10-CM

## 2021-01-26 DIAGNOSIS — N18.30 CHRONIC KIDNEY DISEASE, STAGE 3 UNSPECIFIED: ICD-10-CM

## 2021-01-26 DIAGNOSIS — E11.9 TYPE 2 DIABETES MELLITUS WITHOUT COMPLICATIONS: ICD-10-CM

## 2021-01-27 ENCOUNTER — APPOINTMENT (OUTPATIENT)
Dept: INTERNAL MEDICINE | Facility: CLINIC | Age: 51
End: 2021-01-27

## 2021-02-10 ENCOUNTER — APPOINTMENT (OUTPATIENT)
Dept: INTERNAL MEDICINE | Facility: CLINIC | Age: 51
End: 2021-02-10

## 2021-02-11 ENCOUNTER — APPOINTMENT (OUTPATIENT)
Dept: ENDOCRINOLOGY | Facility: HOSPITAL | Age: 51
End: 2021-02-11

## 2021-02-22 ENCOUNTER — APPOINTMENT (OUTPATIENT)
Dept: INTERNAL MEDICINE | Facility: CLINIC | Age: 51
End: 2021-02-22

## 2021-02-24 ENCOUNTER — NON-APPOINTMENT (OUTPATIENT)
Age: 51
End: 2021-02-24

## 2021-02-25 ENCOUNTER — NON-APPOINTMENT (OUTPATIENT)
Age: 51
End: 2021-02-25

## 2021-03-03 ENCOUNTER — NON-APPOINTMENT (OUTPATIENT)
Age: 51
End: 2021-03-03

## 2021-03-04 ENCOUNTER — NON-APPOINTMENT (OUTPATIENT)
Age: 51
End: 2021-03-04

## 2021-03-04 NOTE — PROGRESS NOTE ADULT - PROBLEM SELECTOR PLAN 6
A1C 1 month ago was 7.8; FS yesterday , no SSI administered   -FS TID   -Diabetic diet  -Hold metformin   -Sliding scale A1C 1 month ago was 7.8; FS yesterday , no SSI administered. 3 units of humalog given in 24 hours.   -FS TID   -Diabetic diet  -Hold metformin   -Sliding scale Griseofulvin Pregnancy And Lactation Text: This medication is Pregnancy Category X and is known to cause serious birth defects. It is unknown if this medication is excreted in breast milk but breast feeding should be avoided.

## 2021-03-11 ENCOUNTER — NON-APPOINTMENT (OUTPATIENT)
Age: 51
End: 2021-03-11

## 2021-03-22 ENCOUNTER — APPOINTMENT (OUTPATIENT)
Dept: INTERNAL MEDICINE | Facility: CLINIC | Age: 51
End: 2021-03-22

## 2021-03-31 ENCOUNTER — NON-APPOINTMENT (OUTPATIENT)
Age: 51
End: 2021-03-31

## 2021-04-01 ENCOUNTER — NON-APPOINTMENT (OUTPATIENT)
Age: 51
End: 2021-04-01

## 2021-04-05 ENCOUNTER — APPOINTMENT (OUTPATIENT)
Dept: INTERNAL MEDICINE | Facility: CLINIC | Age: 51
End: 2021-04-05
Payer: MEDICARE

## 2021-04-05 ENCOUNTER — NON-APPOINTMENT (OUTPATIENT)
Age: 51
End: 2021-04-05

## 2021-04-05 ENCOUNTER — OUTPATIENT (OUTPATIENT)
Dept: OUTPATIENT SERVICES | Facility: HOSPITAL | Age: 51
LOS: 1 days | End: 2021-04-05
Payer: MEDICARE

## 2021-04-05 ENCOUNTER — RESULT CHARGE (OUTPATIENT)
Age: 51
End: 2021-04-05

## 2021-04-05 ENCOUNTER — APPOINTMENT (OUTPATIENT)
Dept: CHRONIC DISEASE MANAGEMENT | Facility: CLINIC | Age: 51
End: 2021-04-05

## 2021-04-05 VITALS
OXYGEN SATURATION: 98 % | WEIGHT: 120 LBS | SYSTOLIC BLOOD PRESSURE: 170 MMHG | BODY MASS INDEX: 22.67 KG/M2 | HEART RATE: 97 BPM | DIASTOLIC BLOOD PRESSURE: 92 MMHG

## 2021-04-05 DIAGNOSIS — I10 ESSENTIAL (PRIMARY) HYPERTENSION: ICD-10-CM

## 2021-04-05 DIAGNOSIS — Z01.419 ENCOUNTER FOR GYNECOLOGICAL EXAMINATION (GENERAL) (ROUTINE) W/OUT ABNORMAL FINDINGS: ICD-10-CM

## 2021-04-05 DIAGNOSIS — D86.9 SARCOIDOSIS, UNSPECIFIED: ICD-10-CM

## 2021-04-05 DIAGNOSIS — N18.30 CHRONIC KIDNEY DISEASE, STAGE 3 UNSPECIFIED: ICD-10-CM

## 2021-04-05 DIAGNOSIS — E55.9 VITAMIN D DEFICIENCY, UNSPECIFIED: ICD-10-CM

## 2021-04-05 DIAGNOSIS — G40.909 EPILEPSY, UNSPECIFIED, NOT INTRACTABLE, WITHOUT STATUS EPILEPTICUS: ICD-10-CM

## 2021-04-05 DIAGNOSIS — Z01.419 ENCOUNTER FOR GYNECOLOGICAL EXAMINATION (GENERAL) (ROUTINE) WITHOUT ABNORMAL FINDINGS: ICD-10-CM

## 2021-04-05 DIAGNOSIS — E11.9 TYPE 2 DIABETES MELLITUS WITHOUT COMPLICATIONS: ICD-10-CM

## 2021-04-05 LAB — HBA1C MFR BLD HPLC: 10

## 2021-04-05 PROCEDURE — 99213 OFFICE O/P EST LOW 20 MIN: CPT | Mod: GE

## 2021-04-05 PROCEDURE — 83036 HEMOGLOBIN GLYCOSYLATED A1C: CPT

## 2021-04-05 PROCEDURE — G0463: CPT | Mod: 25

## 2021-04-05 RX ORDER — LOSARTAN POTASSIUM 25 MG/1
25 TABLET, FILM COATED ORAL
Qty: 90 | Refills: 3 | Status: DISCONTINUED | COMMUNITY
Start: 2018-06-27 | End: 2021-04-05

## 2021-04-05 NOTE — PHYSICAL EXAM
[No Acute Distress] : no acute distress [Normal Sclera/Conjunctiva] : normal sclera/conjunctiva [Normal Outer Ear/Nose] : the outer ears and nose were normal in appearance [No JVD] : no jugular venous distention [No Respiratory Distress] : no respiratory distress  [Normal Rate] : normal rate  [No Rash] : no rash [Coordination Grossly Intact] : coordination grossly intact [Normal Mood] : the mood was normal

## 2021-04-14 ENCOUNTER — APPOINTMENT (OUTPATIENT)
Dept: PULMONOLOGY | Facility: CLINIC | Age: 51
End: 2021-04-14

## 2021-04-16 ENCOUNTER — NON-APPOINTMENT (OUTPATIENT)
Age: 51
End: 2021-04-16

## 2021-04-20 NOTE — HISTORY OF PRESENT ILLNESS
[FreeTextEntry1] : CPE [de-identified] : 50 F hx of insulin dependent DM (A1c>14), sarcoidosis (biopsy proven in 2010), seizure disorder, CKD stage III here for CPE.\par \par #DM: when wakes up gets 110s-130s. checks at lunch, 120-200s, highest was 300s. if >200s takes 10 units insulin. if <200 skips insulin.  does not eat lunch every day. Won't take insulin if she does not eat. Dinner time numbers are mid 200s. Takes 10 units before dinner, always. Infrequently gets values >300 in which case takes 15 units. Takes Lantus 32 units QHS. Is compliant with long-acting. Forgot long-acting last night. Mom passed away. Right now FSG via freestyle is 248. Reports weak and shaky when she gets values of 80s, 90,s 110s. Gets values like that twice per week. Meals are labile. Spoke with nutrition this am. \par \par #HTN: takes amlodipine 10 mg and losartan 25 in AM, then losartan 50 mg in evening. Checks BPs at home, 150-160s, one time had 170s. \par \par #Sarcoidosis: has televisit with pul on 4/14. \par \par #CKDIII: used to see Dr. Garibay, lost to follow up. \par \par #Seizure d/o: takes vimpat and phenytoin. No seizures.

## 2021-04-20 NOTE — HISTORY OF PRESENT ILLNESS
[FreeTextEntry1] : CPE [de-identified] : 50 F hx of insulin dependent DM (A1c>14), sarcoidosis (biopsy proven in 2010), seizure disorder, CKD stage III here for CPE.\par \par #DM: when wakes up gets 110s-130s. checks at lunch, 120-200s, highest was 300s. if >200s takes 10 units insulin. if <200 skips insulin.  does not eat lunch every day. Won't take insulin if she does not eat. Dinner time numbers are mid 200s. Takes 10 units before dinner, always. Infrequently gets values >300 in which case takes 15 units. Takes Lantus 32 units QHS. Is compliant with long-acting. Forgot long-acting last night. Mom passed away. Right now FSG via freestyle is 248. Reports weak and shaky when she gets values of 80s, 90,s 110s. Gets values like that twice per week. Meals are labile. Spoke with nutrition this am. \par \par #HTN: takes amlodipine 10 mg and losartan 25 in AM, then losartan 50 mg in evening. Checks BPs at home, 150-160s, one time had 170s. \par \par #Sarcoidosis: has televisit with pul on 4/14. \par \par #CKDIII: used to see Dr. Garibay, lost to follow up. \par \par #Seizure d/o: takes vimpat and phenytoin. No seizures.

## 2021-04-20 NOTE — REVIEW OF SYSTEMS
[Fever] : no fever [Chest Pain] : no chest pain [Abdominal Pain] : no abdominal pain [Joint Pain] : no joint pain [Suicidal] : not suicidal

## 2021-04-20 NOTE — PLAN
[FreeTextEntry1] : #Diabetes mellitus, type 2 \par -improved to 10 today, last POCT A1C was >14. BS on Freestyle moshe today was 238 because she missed last night's Lantus dose. \par -discussed eating regular meals to avoid labile FSG and hypoglycemia. Nutrition met with her today and she was given resources for healthier meal options. \par -continue with Lantus 32 QHS, 10 premeal\par -endo appt next week\par -podiatry referral given, will schedule her own ophtho  \par \par #Essential hypertension \par -patient takes losartan 25 in the AM and 50 mg in the PM, also with amlodipine. She says her systolic BP is around 150-160 at home. today on repeat BP is 150/90. \par -increase losartan to 50 BID \par \par #CKD (chronic kidney disease) stage 3, GFR 30-59 ml/min\par -patient with baseline creatinine around 1.4. Last BMP from 2/2021 showing baseline Cr. will recheck BMP at next visit given increase in losartan.  \par \par #Sarcoidosis \par -patient is on chronic steroids but does not follow up with her pulmonologist. is scheduled to see pulm on 4/13.\par -continue prednisone 15 daily \par \par #Seizure disorder \par -no recent seizures\par -given referral to neuro \par -on vimpat and phenytoin \par \par #HLD (hyperlipidemia) \par -continue with statin, f/u lipid profile at next visit\par -may need uptitration given phenytoin decreases serum concentrations of statin. pharm recs appreciated!\par \par #HCM\par -declined pap today, will refer to GYN\par -no flu shot, stressed importance of COVID vaccine. pt states she is hesitant but will think about getting it. \par \par RTC for BP check \par Dw Dr. Dee

## 2021-04-21 ENCOUNTER — APPOINTMENT (OUTPATIENT)
Dept: ENDOCRINOLOGY | Facility: CLINIC | Age: 51
End: 2021-04-21
Payer: MEDICARE

## 2021-04-21 VITALS
BODY MASS INDEX: 22.66 KG/M2 | WEIGHT: 120 LBS | HEIGHT: 61 IN | SYSTOLIC BLOOD PRESSURE: 140 MMHG | HEART RATE: 93 BPM | OXYGEN SATURATION: 98 % | DIASTOLIC BLOOD PRESSURE: 80 MMHG | TEMPERATURE: 97.8 F

## 2021-04-21 PROCEDURE — 99204 OFFICE O/P NEW MOD 45 MIN: CPT | Mod: 25

## 2021-04-21 PROCEDURE — 82962 GLUCOSE BLOOD TEST: CPT

## 2021-04-21 PROCEDURE — 36415 COLL VENOUS BLD VENIPUNCTURE: CPT

## 2021-04-22 NOTE — ASSESSMENT
[FreeTextEntry1] : 49 yo F with PMH DM2, HTN, HLD, hypercalcemia 2/2 sarcoidosis\par \par 1. DM2 - continue lantus and humalog. d/c janumet and rx metformin 500 mg BID\par \par 2. HTN - amlodipine, losartan\par \par 3. HLD - atorvastatin\par

## 2021-04-22 NOTE — HISTORY OF PRESENT ILLNESS
[FreeTextEntry1] : 49 yo F with PMH DM2, HTN, HLD, hypercalcemia 2/2 sarcoidosis\par \par B: 2 bread butter\par L; 1/4 plate of rice meat + water\par D: veg + roti + water\par snacks apples , bananas\par lantus 32 u qhs humalog 10 -15 u tidac\par also on januvia  mg qd\par prednisone 15 mg qd taken at 6AM \par last ophthalmologist visit 4/2021

## 2021-04-28 LAB
25(OH)D3 SERPL-MCNC: 10.5 NG/ML
ALBUMIN SERPL ELPH-MCNC: 3.9 G/DL
ALP BLD-CCNC: 49 U/L
ALT SERPL-CCNC: 6 U/L
ANION GAP SERPL CALC-SCNC: 10 MMOL/L
AST SERPL-CCNC: 8 U/L
BILIRUB SERPL-MCNC: <0.2 MG/DL
BUN SERPL-MCNC: 27 MG/DL
C PEPTIDE SERPL-MCNC: 6.6 NG/ML
CALCIUM SERPL-MCNC: 9.5 MG/DL
CHLORIDE SERPL-SCNC: 103 MMOL/L
CHOLEST SERPL-MCNC: 309 MG/DL
CK SERPL-CCNC: 27 U/L
CO2 SERPL-SCNC: 23 MMOL/L
CREAT SERPL-MCNC: 1.41 MG/DL
CREAT SPEC-SCNC: 74 MG/DL
GLUCOSE BLDC GLUCOMTR-MCNC: 279
GLUCOSE SERPL-MCNC: 253 MG/DL
HDLC SERPL-MCNC: 77 MG/DL
LDLC SERPL CALC-MCNC: 184 MG/DL
MICROALBUMIN 24H UR DL<=1MG/L-MCNC: 321.9 MG/DL
MICROALBUMIN/CREAT 24H UR-RTO: 4373 MG/G
NONHDLC SERPL-MCNC: 232 MG/DL
POTASSIUM SERPL-SCNC: 5.7 MMOL/L
PROT SERPL-MCNC: 6.6 G/DL
SODIUM SERPL-SCNC: 137 MMOL/L
T4 FREE SERPL-MCNC: 0.9 NG/DL
TRIGL SERPL-MCNC: 239 MG/DL
TSH SERPL-ACNC: 1.29 UIU/ML

## 2021-05-14 ENCOUNTER — APPOINTMENT (OUTPATIENT)
Dept: INTERNAL MEDICINE | Facility: CLINIC | Age: 51
End: 2021-05-14

## 2021-05-26 ENCOUNTER — APPOINTMENT (OUTPATIENT)
Dept: PULMONOLOGY | Facility: CLINIC | Age: 51
End: 2021-05-26

## 2021-06-03 ENCOUNTER — NON-APPOINTMENT (OUTPATIENT)
Age: 51
End: 2021-06-03

## 2021-07-01 ENCOUNTER — NON-APPOINTMENT (OUTPATIENT)
Age: 51
End: 2021-07-01

## 2021-07-14 ENCOUNTER — APPOINTMENT (OUTPATIENT)
Dept: NEPHROLOGY | Facility: CLINIC | Age: 51
End: 2021-07-14

## 2021-07-15 ENCOUNTER — NON-APPOINTMENT (OUTPATIENT)
Age: 51
End: 2021-07-15

## 2021-07-16 ENCOUNTER — NON-APPOINTMENT (OUTPATIENT)
Age: 51
End: 2021-07-16

## 2021-07-20 ENCOUNTER — NON-APPOINTMENT (OUTPATIENT)
Age: 51
End: 2021-07-20

## 2021-07-21 ENCOUNTER — APPOINTMENT (OUTPATIENT)
Dept: PULMONOLOGY | Facility: CLINIC | Age: 51
End: 2021-07-21

## 2021-07-26 ENCOUNTER — OUTPATIENT (OUTPATIENT)
Dept: OUTPATIENT SERVICES | Facility: HOSPITAL | Age: 51
LOS: 1 days | End: 2021-07-26
Payer: MEDICARE

## 2021-07-26 ENCOUNTER — APPOINTMENT (OUTPATIENT)
Dept: INTERNAL MEDICINE | Facility: CLINIC | Age: 51
End: 2021-07-26
Payer: MEDICARE

## 2021-07-26 VITALS
DIASTOLIC BLOOD PRESSURE: 82 MMHG | SYSTOLIC BLOOD PRESSURE: 138 MMHG | BODY MASS INDEX: 22.66 KG/M2 | HEART RATE: 94 BPM | WEIGHT: 120 LBS | OXYGEN SATURATION: 98 % | HEIGHT: 61 IN

## 2021-07-26 DIAGNOSIS — I10 ESSENTIAL (PRIMARY) HYPERTENSION: ICD-10-CM

## 2021-07-26 LAB
GLUCOSE BLDC GLUCOMTR-MCNC: 96
HBA1C MFR BLD HPLC: 14

## 2021-07-26 PROCEDURE — G0463: CPT

## 2021-07-26 PROCEDURE — 99213 OFFICE O/P EST LOW 20 MIN: CPT | Mod: GE

## 2021-07-26 RX ORDER — SITAGLIPTIN AND METFORMIN HYDROCHLORIDE 50; 500 MG/1; MG/1
50-500 TABLET, FILM COATED ORAL DAILY
Qty: 90 | Refills: 3 | Status: DISCONTINUED | COMMUNITY
Start: 2019-09-13 | End: 2021-07-26

## 2021-07-27 DIAGNOSIS — E11.9 TYPE 2 DIABETES MELLITUS WITHOUT COMPLICATIONS: ICD-10-CM

## 2021-07-27 NOTE — PHYSICAL EXAM
[Normal] : soft, non-tender, non-distended, no masses palpated, no HSM and normal bowel sounds [de-identified] : Periorbital hyperpigmentation

## 2021-07-27 NOTE — ASSESSMENT
[FreeTextEntry1] : 50 y.o. F with PMH of uncontrolled T2DM, HTN, CKD, sarcoidosis on daily prednisone, seizure disorder, HLD, presents for follow up of multiple chronic medical problems, found to have A1C of 14 with frequent labile blood glucose levels. \par \par # T2DM - Uncontrolled likely in s/o nonadherence and c/b steroid induced hyperglycemia. \par - A1C today 07/2021 = 14 \par - Extensive counseling on medication adherence provided \par - INCREASE lantus to 38 U qHS (given morning fasting glucose consistently above 190) \par - STOP pre-meal humalog as pt with frequent hypoglycemia in s/o poor appetite and meal skipping\par - START sliding scale: 2U of Humalog for every 50 above 150; ie: 151-200=2U, 201-250=4U, etc. to prevent hypoglycemia \par - Tasked endocrinology to squeeze in a sooner appointment. Currently scheduled for f/u in 09/2021, which may be too far out \par - Follow up with [facility] within 1-2 weeks for sugar control \par \par # DM specific HCM \par - Optho exam UTD, next due 04/2022 \par - Foot exam UTD, next due 01/2022. Will give podiatry referral next visit \par - Evidence of CKD and nephrotic range proteinuria; already on Losartan. Appointment with Nephro scheduled 09/2021, will call office to try and expedite.\par - Neuropathy - on GPN 300mg TID \par \par # HTN - at goal \par - BP improved; 138/82 today, will c/w current regimen of Losartan 50mg BID and Amlodipine 10mg qD \par \par # Sarcoidosis\par - Will need to discuss with Pulm re: prednisone taper vs able to come off entirely? as hyperglycemia likely more challenging to manage in s/o steroid use \par - appt scheduled 8/4/21 \par \par \par

## 2021-07-27 NOTE — REVIEW OF SYSTEMS
[Fatigue] : fatigue [Palpitations] : palpitations [Negative] : Gastrointestinal [Recent Change In Weight] : ~T no recent weight change [Lower Ext Edema] : no lower extremity edema

## 2021-07-27 NOTE — HISTORY OF PRESENT ILLNESS
[FreeTextEntry1] : Follow up for diabetes  [de-identified] : 50 y.o. F with PMH of uncontrolled T2DM, HTN, CKD, sarcoidosis on daily prednisone, seizure disorder, HLD, presents for follow up of diabetes and HTN. \par \par Pt was last seen for CPE in April 2021, during which her A1C was found to have improved from 14 to 10. Her BP was uncontrolled and losartan was increased to 50mg BID. In the interim, pt was contacted or hyperglycemia to 600s and findings of nephrotic range proteinuria. She is scheduled to see Endo and Nephro in September 2021. \par \par # T2DM \par - prescribed regimen is basaglar 35U qHS and humalog 10-15U qAC \par - forgets to take lantus 2-3 x/wk. fasting am sugars are 190s at lowest\par - she has a continuous glucose monitoring device, average range 300s, hypoglycemic to 70s frequently \par - pt administers humalog only if BG over 200, which varies from 10-15U based on her appetite; she reports poor appetite overall\par - during hypoglycemic episodes pt feels dizzy and weak; improves with orange juice consumption \par - denies n/v/d, abdominal pain, polyuria, polydipsia; denies prior DKA or hospitalization for DM \par - no dedicated physical activity but pt taking care of 3 grandchildren, which keeps her active \par - consumes 1 cup of rice daily. drinks water mostly but has had to drink orange juice frequently due to hypoglycemic episodes \par - saw eye doctor in April, was told everything is fine\par - never established care with podiatry \par - has b/l feet paresthesia and pain; adherent to  TID \par \par # HTN\par - adherent to Losartan 50mg BID and Amlodipine 10mg qD \par - no LE swelling \par \par # Sarcoidosis\par - still on prednisone 15mg qD \par - scheduled to see Pulm 8/4/21\par \par

## 2021-07-27 NOTE — PLAN
[FreeTextEntry1] : RTC in 1-2 weeks to monitor glycemic control. Follow up with Endocrine as soon as possible. \par \par Care discussed with Dr. Hyatt.

## 2021-07-30 RX ORDER — INSULIN LISPRO 100 [IU]/ML
100 INJECTION, SOLUTION INTRAVENOUS; SUBCUTANEOUS
Qty: 1 | Refills: 2 | Status: DISCONTINUED | COMMUNITY
Start: 2017-11-24 | End: 2021-07-30

## 2021-07-31 NOTE — CHART NOTE - NSCHARTNOTEFT_GEN_A_CORE
MICU Transfer Note    Transfer from: MICU  Transfer to:  (X) Medicine    (  ) Telemetry    (  ) RCU    (  ) Palliative    (  ) Stroke Unit    (  ) _______________  Accepting physician: Dr. 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  Per outpatient notes pt on prednisone 10 daily.  Pt recently given fluconazole and nystatin for concern for oral candidiasis.  Pt previously noted to be taking roughly 35 Lantus and 15 Humalog TID however had episodes of hypoglycemia for which she was told to take Humalog 5 when she notes FS <120. BMP glucose 1039, K+ 7.6. Found to be COVID+.    MICU COURSE: Patient admitted to MICU and started on insulin drip. Hyperglycemia resolved and patient transitioned to basal bolus on diet. Hyperkalemia fully resolved. Patient was continued on home prednisone and anti-epileptic medications. Patient oxygen requirements remained at 4L NC for duration of her stay. Patient is clinically stable and ready for transition to floors.    Vital Signs Last 24 Hrs  T(C): 36.6 (18 Apr 2020 23:00), Max: 37.3 (18 Apr 2020 12:00)  T(F): 97.9 (18 Apr 2020 23:00), Max: 99.2 (18 Apr 2020 12:00)  HR: 98 (18 Apr 2020 23:00) (93 - 111)  BP: 126/76 (18 Apr 2020 23:00) (109/71 - 131/95)  BP(mean): 96 (18 Apr 2020 23:00) (83 - 105)  RR: 22 (18 Apr 2020 23:00) (20 - 24)  SpO2: 96% (18 Apr 2020 23:00) (93% - 99%)  I&O's Summary    17 Apr 2020 07:01  - 18 Apr 2020 07:00  --------------------------------------------------------  IN: 240 mL / OUT: 0 mL / NET: 240 mL    18 Apr 2020 07:01  -  18 Apr 2020 23:08  --------------------------------------------------------  IN: 1043 mL / OUT: 400 mL / NET: 643 mL          MEDICATIONS  (STANDING):  amLODIPine   Tablet 10 milliGRAM(s) Oral daily  chlorhexidine 4% Liquid 1 Application(s) Topical <User Schedule>  dextrose 5%. 1000 milliLiter(s) (50 mL/Hr) IV Continuous <Continuous>  dextrose 50% Injectable 12.5 Gram(s) IV Push once  dextrose 50% Injectable 25 Gram(s) IV Push once  dextrose 50% Injectable 25 Gram(s) IV Push once  gabapentin 300 milliGRAM(s) Oral at bedtime  heparin   Injectable 5000 Unit(s) SubCutaneous every 12 hours  hydroxychloroquine   Oral   insulin glargine Injectable (LANTUS) 30 Unit(s) SubCutaneous at bedtime  insulin lispro (HumaLOG) corrective regimen sliding scale   SubCutaneous three times a day before meals  insulin lispro (HumaLOG) corrective regimen sliding scale   SubCutaneous at bedtime  insulin lispro Injectable (HumaLOG) 8 Unit(s) SubCutaneous three times a day before meals  lacosamide 150 milliGRAM(s) Oral two times a day  pantoprazole  Injectable 40 milliGRAM(s) IV Push daily  phenytoin   Capsule 300 milliGRAM(s) Oral daily  predniSONE   Tablet 10 milliGRAM(s) Oral daily    MEDICATIONS  (PRN):  dextrose 40% Gel 15 Gram(s) Oral once PRN Blood Glucose LESS THAN 70 milliGRAM(s)/deciliter  glucagon  Injectable 1 milliGRAM(s) IntraMuscular once PRN Glucose LESS THAN 70 milligrams/deciliter        LABS                                            10.9                  Neurophils% (auto):   x      (04-18 @ 15:32):    6.11 )-----------(233          Lymphocytes% (auto):  x                                             33.3                   Eosinphils% (auto):   x        Manual%: Neutrophils x    ; Lymphocytes x    ; Eosinophils x    ; Bands%: x    ; Blasts x                                    140    |  107    |  41                  Calcium: 9.4   / iCa: x      (04-18 @ 15:32)    ----------------------------<  194       Magnesium: x                                5.3     |  20     |  1.36             Phosphorous: x        TPro  6.8    /  Alb  3.0    /  TBili  0.2    /  DBili  x      /  AST  19     /  ALT  6      /  AlkPhos  38     18 Apr 2020 15:32    ( 04-18 @ 06:26 )   PT: 12.5 sec;   INR: 1.09 ratio  aPTT: 29.4 sec      Assessment: 48yo woman h/o severe pulmonary and neurosarcoidosis with seizures, DMII nonadherent to medications, CKD III, HTN, HLD transferred to ICU for HHS mgmt likely 2/2 COVID19 infection.    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  c/w home meds dilantin 300mg daily, vimpat 150mg bid, gabapentin 300mg at bedtime  f/u phenytoin and lacosamide levels    Gastrointestinal:  Prophylaxis  - Pantoprazole 40mg IV daily    Genitourinary:  strict I/Os while mgmt HHS    ID:  Sepsis 2/2 COVID+  plaquenil started 4/18  on daily prednisone 10mg daily for known sarcoidosis - continuing  EKG daily  QTc on last     Endo:  A1c 14.2  HHS resolved on basal bolus Lantus 30u/premeal 8u, as per endocrine  Will require outpatient endo, ophto, renal, podaitry workup    Diet: NPO until switch from insulin gtt to lantus    Code: FULL CODE    Follow Ups:  [ ] Lacosamide and Phenytoin levels  [ ] ctm for oral thrush  [ ] Obtain HIV consent for testing when able    [ ] T-Cell Subset Statement Selected

## 2021-08-04 ENCOUNTER — APPOINTMENT (OUTPATIENT)
Dept: PULMONOLOGY | Facility: CLINIC | Age: 51
End: 2021-08-04

## 2021-08-09 ENCOUNTER — OUTPATIENT (OUTPATIENT)
Dept: OUTPATIENT SERVICES | Facility: HOSPITAL | Age: 51
LOS: 1 days | End: 2021-08-09
Payer: MEDICARE

## 2021-08-09 ENCOUNTER — RESULT CHARGE (OUTPATIENT)
Age: 51
End: 2021-08-09

## 2021-08-09 ENCOUNTER — APPOINTMENT (OUTPATIENT)
Dept: INTERNAL MEDICINE | Facility: CLINIC | Age: 51
End: 2021-08-09
Payer: MEDICARE

## 2021-08-09 ENCOUNTER — LABORATORY RESULT (OUTPATIENT)
Age: 51
End: 2021-08-09

## 2021-08-09 VITALS
WEIGHT: 122 LBS | SYSTOLIC BLOOD PRESSURE: 140 MMHG | HEART RATE: 97 BPM | BODY MASS INDEX: 23.03 KG/M2 | HEIGHT: 61 IN | DIASTOLIC BLOOD PRESSURE: 90 MMHG | OXYGEN SATURATION: 99 %

## 2021-08-09 DIAGNOSIS — I10 ESSENTIAL (PRIMARY) HYPERTENSION: ICD-10-CM

## 2021-08-09 PROCEDURE — 99214 OFFICE O/P EST MOD 30 MIN: CPT | Mod: GC

## 2021-08-09 PROCEDURE — G0463: CPT

## 2021-08-09 PROCEDURE — 80053 COMPREHEN METABOLIC PANEL: CPT

## 2021-08-09 RX ORDER — METFORMIN HYDROCHLORIDE 500 MG/1
500 TABLET, COATED ORAL
Qty: 180 | Refills: 0 | Status: COMPLETED | COMMUNITY
Start: 2021-04-22 | End: 2021-08-09

## 2021-08-10 DIAGNOSIS — E11.9 TYPE 2 DIABETES MELLITUS WITHOUT COMPLICATIONS: ICD-10-CM

## 2021-08-10 LAB
ALBUMIN SERPL ELPH-MCNC: 4 G/DL — SIGNIFICANT CHANGE UP (ref 3.3–5)
ALP SERPL-CCNC: 50 U/L — SIGNIFICANT CHANGE UP (ref 40–120)
ALT FLD-CCNC: 9 U/L — LOW (ref 10–45)
ANION GAP SERPL CALC-SCNC: 13 MMOL/L — SIGNIFICANT CHANGE UP (ref 5–17)
AST SERPL-CCNC: 17 U/L — SIGNIFICANT CHANGE UP (ref 10–40)
BILIRUB SERPL-MCNC: <0.2 MG/DL — SIGNIFICANT CHANGE UP (ref 0.2–1.2)
BUN SERPL-MCNC: 24 MG/DL — HIGH (ref 7–23)
CALCIUM SERPL-MCNC: 9.5 MG/DL — SIGNIFICANT CHANGE UP (ref 8.4–10.5)
CHLORIDE SERPL-SCNC: 103 MMOL/L — SIGNIFICANT CHANGE UP (ref 96–108)
CO2 SERPL-SCNC: 21 MMOL/L — LOW (ref 22–31)
CREAT SERPL-MCNC: 1.41 MG/DL — HIGH (ref 0.5–1.3)
GLUCOSE SERPL-MCNC: 302 MG/DL — HIGH (ref 70–99)
POTASSIUM SERPL-MCNC: 5.1 MMOL/L — SIGNIFICANT CHANGE UP (ref 3.5–5.3)
POTASSIUM SERPL-SCNC: 5.1 MMOL/L — SIGNIFICANT CHANGE UP (ref 3.5–5.3)
PROT SERPL-MCNC: 6.8 G/DL — SIGNIFICANT CHANGE UP (ref 6–8.3)
SODIUM SERPL-SCNC: 136 MMOL/L — SIGNIFICANT CHANGE UP (ref 135–145)

## 2021-08-10 NOTE — PLAN
[FreeTextEntry1] : \par 50 F with hx of uncontrolled DM, HTN, CKD, sarcoidosis (on daily 15g prednisone), HLD, presenting for follow up for her diabetes management and for her new LE swelling B/L\par \par # Uncontrolled DM\par - No hypoglycemic episodes since last visit but still getting symptomatic (dizziness). No LOC or syncope. Persistent hyperglycemia likely i/s/o continued prednisone use and dietary choices\par - Educated patient on appropriate dietary choices given her diabetes\par - Continue Lantus 38u qhs, can't increase further given glucose reading of 90 in AM\par - Advised patient to keep log of glucose readings daily (premeal and postmeal) and to bring them at next  visit so that adjustments can be made to correctional scale if needed \par - Sent prescription for Metformin HCl ER 500mg qd. Patient stopped taking metformin 500mg BID before because it was giving her diarrhea. ER version may have less risk of GI upset and patient amenable to trying medication.\par \par # Hypertension\par - /90, repeat /80\par - C/w amlodipine 10mg and losartan 50mg BID \par - Check CMP for renal function and electrolytes \par - Sent prescription for HCTZ 12.5mg qd. Can increase dose if tolerated at next visit.\par - Also sent script for new BP machine to pharmacy per pt's request\par - Recommend low salt diet\par \par # LE swelling\par - B/L swelling, symmetric, associated with pain on walking/standing. Likely worsened by uncontrolled sugar. Pain probably worsening neuropathy i/s/o poor diabetes control. No ulcers, cuts, wounds.\par - Recommend symptom management using compression stockings, leg elevation, and warm compress\par - Prescription for HCTZ to reduce swelling which could potentially improve pain\par - C/w gabapentin\par - Advised patient to avoid open toe shoes to prevent cuts or injury to foot. \par \par # HCM\par - Referral for GYN for cervical cancer screening\par - Mammography referral for breast cancer screening \par - FIT kit provided for colon cancer screening\par - Patient not planning to get COVID vaccine because she felt she has enough going on and was concerned the vaccine would introduce more issues. \par \par RTC in 1 week for blood glucose log check and f/u on LE swelling.\par \par Discussed with Dr. Mitchell\par \par Saji Madsen, PGY-2\par [facility]

## 2021-08-10 NOTE — HISTORY OF PRESENT ILLNESS
[FreeTextEntry1] : Follow up for Diabetes and Blood glucose level [de-identified] : 50 F with hx of uncontrolled DM, HTN, CKD, sarcoidosis (on 15mg prednisone), HLD, seizure d/o, presenting for follow up for her blood glucose readings. Patient also with complaints of LE swelling and pain. Since last visit, insulin regimen was adjusted - lantus was increased to 38u, premeal insulin discontinued, and started on correctional scale insulin. Patient was having difficulties getting the sliding scale insulin but finally got it a few days ago, so she hasn't been taking it until recently. Felt lightheaded occasionally but denied syncope. Glucose level this AM was 90. No readings in the 70s/80s. Denies chest pain, abdominal pain, changes in BMs or urination, vision changes. Experiences some dyspnea with exertion but this is not new and is attributed to her sarcoidosis. \par \par In regards to her LE swelling, pt first noticed symptoms last Thursday. She has never experienced these symptoms before. Symptoms associated with pain, especially with walking or standing. She takes gabapentin for her neuropathic pain, which she says has not really been helping with this new pain. Denies recent trauma or injury to feet. Has tried massaging her feet, but it does not improve the pain or swelling.

## 2021-08-10 NOTE — REVIEW OF SYSTEMS
[Fatigue] : fatigue [Lower Ext Edema] : lower extremity edema [Dyspnea on Exertion] : dyspnea on exertion [Dizziness] : dizziness [Negative] : Heme/Lymph [Fever] : no fever [Chills] : no chills [Hot Flashes] : no hot flashes [Night Sweats] : no night sweats [Recent Change In Weight] : ~T no recent weight change [Vision Problems] : no vision problems [Shortness Of Breath] : no shortness of breath [Wheezing] : no wheezing [Cough] : no cough [Headache] : no headache [Fainting] : no fainting

## 2021-08-10 NOTE — END OF VISIT
[] : Resident [FreeTextEntry3] : disucssed dm mgmt prevention of complications and htn mgmt fu 1w with sugar log.

## 2021-08-13 ENCOUNTER — NON-APPOINTMENT (OUTPATIENT)
Age: 51
End: 2021-08-13

## 2021-08-16 ENCOUNTER — OUTPATIENT (OUTPATIENT)
Dept: OUTPATIENT SERVICES | Facility: HOSPITAL | Age: 51
LOS: 1 days | End: 2021-08-16
Payer: MEDICARE

## 2021-08-16 ENCOUNTER — APPOINTMENT (OUTPATIENT)
Dept: INTERNAL MEDICINE | Facility: CLINIC | Age: 51
End: 2021-08-16

## 2021-08-16 DIAGNOSIS — I10 ESSENTIAL (PRIMARY) HYPERTENSION: ICD-10-CM

## 2021-08-16 PROCEDURE — G0463: CPT

## 2021-08-18 ENCOUNTER — APPOINTMENT (OUTPATIENT)
Dept: PULMONOLOGY | Facility: CLINIC | Age: 51
End: 2021-08-18
Payer: MEDICARE

## 2021-08-18 VITALS
SYSTOLIC BLOOD PRESSURE: 128 MMHG | HEIGHT: 61 IN | BODY MASS INDEX: 23.79 KG/M2 | RESPIRATION RATE: 16 BRPM | TEMPERATURE: 97.2 F | WEIGHT: 126 LBS | HEART RATE: 84 BPM | DIASTOLIC BLOOD PRESSURE: 85 MMHG

## 2021-08-18 DIAGNOSIS — B36.9 SUPERFICIAL MYCOSIS, UNSPECIFIED: ICD-10-CM

## 2021-08-18 DIAGNOSIS — U07.1 COVID-19: ICD-10-CM

## 2021-08-18 PROCEDURE — 99213 OFFICE O/P EST LOW 20 MIN: CPT | Mod: CS,GC

## 2021-08-18 NOTE — ASSESSMENT
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 PFTs from 2020 reveal  restrictive defect with severe reduction in DLCO.\par \par \par Pulmonary sarcoidosis:\par - as patient with stable symptoms over the past year, can decrease prednisone to 10mg/ day\par - patient to keep log of symptoms, and if worsening SOB, increase back up to 15 mg\par \par Breast rash;\par - patient with pruritic rash under bilateral breasts likely fungal in nature\par - will prescribe nystatin powder and follow up with medicine\par \par \par RTC in 2 months to evaluate symptoms.\par \par Plan discussed with Dr. Leslie

## 2021-08-18 NOTE — HISTORY OF PRESENT ILLNESS
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 After that, she was admitted to the hospital for COVID PNA and hyperglycemia. She has since followed up with the medicine clinic with ongoing management of uncontrolled DM and HTN.\par \par Patient has been on prednisone 15mg, and reports taking it fairly consistently. She states she misses about 1 dose/ week. Her respiratory symptoms are stable. She has SOB when she walks up the stairs or carries something heavy, unchanged from one year prior. No SOB at rest. \par \par Last PFTs from 2020 showing restrictive lung disease with severely decreased DLCO.

## 2021-08-18 NOTE — PHYSICAL EXAM
[No Acute Distress] : no acute distress [Well Nourished] : well nourished [Normal Oropharynx] : normal oropharynx [Normal Appearance] : normal appearance [No Neck Mass] : no neck mass [Normal Rate/Rhythm] : normal rate/rhythm [Normal S1, S2] : normal s1, s2 [No Abnormalities] : no abnormalities [Benign] : benign [No Edema] : no edema [Normal Color/ Pigmentation] : normal color/ pigmentation [No Focal Deficits] : no focal deficits [Oriented x3] : oriented x3 [Normal Affect] : normal affect [TextBox_68] : mild bibasilar crackles

## 2021-08-18 NOTE — REVIEW OF SYSTEMS
[Fever] : no fever [Fatigue] : no fatigue [Dry Eyes] : no dry eyes [Eye Irritation] : no eye irritation [Cough] : no cough [Sputum] : no sputum [Chest Discomfort] : no chest discomfort [Arthralgias] : no arthralgias [Myalgias] : no myalgias [Anemia] : no anemia

## 2021-08-30 DIAGNOSIS — E11.9 TYPE 2 DIABETES MELLITUS WITHOUT COMPLICATIONS: ICD-10-CM

## 2021-08-30 NOTE — ASSESSMENT
[FreeTextEntry1] : 50 y.o. F with PMH of uncontrolled T2DM, HTN, CKD, sarcoidosis on daily prednisone (15mg), seizure disorder, HLD, presents for follow up of DM w/ A1C of 14 (7/2021).

## 2021-08-30 NOTE — PLAN
[FreeTextEntry1] : \par # T2DM - Uncontrolled d/t steroid induced hyperglycemia. \par - A1C 07/2021 = 14 \par -Lantus 38u qHS\par -sliding scale: 2U of Humalog for every 50 above 150; ie: 151-200=2U, 201-250=4U, etc.\par -started Metformin ER 500mg last visit, tolerating well, no GI complaints\par -Has pulm appointment for sarcoidosis on steroids, possibly taper?, appt scheduled 8/18/21 \par -Has endo appointment in 9/24/21\par -FS broad range ranging from , given the lability of FS and in range AM sugars, will continue with current regimen\par \par #HTN - uncontrolled currently\par -currently on amlodipine 10mg and losartan 50mg BID & HCTZ 12.5mg qd\par -states she started taking HCTZ 5 days ago\par -will monitor for now, she hasn't been checking her BP at home\par -advised to bring in her BP log next visit\par \par d/w Dr. Echavarria\par RTC in 10/2021\par

## 2021-08-30 NOTE — HISTORY OF PRESENT ILLNESS
[Home] : at home, [unfilled] , at the time of the visit. [Medical Office: ([facility])___] : at the medical office located in  [Verbal consent obtained from patient] : the patient, [unfilled] [FreeTextEntry1] : DM follow up [de-identified] : 50 y.o. F with PMH of uncontrolled T2DM, HTN, CKD, sarcoidosis on daily prednisone (15mg), seizure disorder, HLD, presents for follow up of DM w/ A1C of 14 (7/2021). \par \par #DM - Fingersticks\par Before breakfast 120\par 11AM after breakfast: 179\par 2:30PM: after lunch 251\par 3:37PM: 128\par 5PM right before dinner: 89\par 5:07 PM after dinner: 107\par 6:07PM: 167\par 9:30PM: 264\par \par Yesterday\par Afternoon 4PM: right after eating (Rice, spinach, chickpeas) \par \par No dizziness over the past week/ no hypoglycemic episodes\par \par Todays BP is 145/93; HR 86s

## 2021-09-01 ENCOUNTER — APPOINTMENT (OUTPATIENT)
Dept: NEPHROLOGY | Facility: CLINIC | Age: 51
End: 2021-09-01
Payer: MEDICARE

## 2021-09-01 ENCOUNTER — OUTPATIENT (OUTPATIENT)
Dept: OUTPATIENT SERVICES | Facility: HOSPITAL | Age: 51
LOS: 1 days | End: 2021-09-01

## 2021-09-01 ENCOUNTER — RESULT REVIEW (OUTPATIENT)
Age: 51
End: 2021-09-01

## 2021-09-01 VITALS
DIASTOLIC BLOOD PRESSURE: 88 MMHG | RESPIRATION RATE: 16 BRPM | OXYGEN SATURATION: 99 % | HEART RATE: 97 BPM | SYSTOLIC BLOOD PRESSURE: 128 MMHG | HEIGHT: 61 IN | WEIGHT: 127 LBS | BODY MASS INDEX: 23.98 KG/M2

## 2021-09-01 VITALS — TEMPERATURE: 96.5 F

## 2021-09-01 DIAGNOSIS — N18.32 CHRONIC KIDNEY DISEASE, STAGE 3B: ICD-10-CM

## 2021-09-01 DIAGNOSIS — I10 ESSENTIAL (PRIMARY) HYPERTENSION: ICD-10-CM

## 2021-09-01 DIAGNOSIS — R80.9 PROTEINURIA, UNSPECIFIED: ICD-10-CM

## 2021-09-01 LAB
CREAT ?TM UR-MCNC: 53 MG/DL — SIGNIFICANT CHANGE UP
PROT ?TM UR-MCNC: 291 MG/DL — SIGNIFICANT CHANGE UP
PROT/CREAT UR-RTO: 5.5 RATIO — HIGH (ref 0–0.2)

## 2021-09-01 PROCEDURE — 99214 OFFICE O/P EST MOD 30 MIN: CPT | Mod: GC

## 2021-09-01 RX ORDER — ATORVASTATIN CALCIUM 40 MG/1
40 TABLET, FILM COATED ORAL
Qty: 90 | Refills: 3 | Status: DISCONTINUED | COMMUNITY
Start: 2018-01-25 | End: 2021-09-01

## 2021-09-01 NOTE — ASSESSMENT
[FreeTextEntry1] : 1. CKD Stage 3b/Proteinuria: Pt. with previous history of MIKALA in the setting of hypercalcemia. Pt was initially seen during her hospitalization at [facility] from 09/25/17 to 10/03/17. At that time, the patient was admitted with Scr of 5.65 and serum calcium of 15.3 on 09/25/17. Scr improved to 3.48 and serum calcium decreased to 10.7 on 10/03/17. Scr has further improved 1.44 and serum calcium is WNL at 10.1 on labs done on 9/24/18. Patient had a renal sonogram done on 12/26/17 which revealed no hydronephrosis. 24-hour urine for protein was 300 mg on 12/26/17. Last Scr checked on 4/28/21 was elevated/stable at 1.41. However pt. noted to have increasing spot urine microalbumin-to-creatinine ratio since January 2021. Spot urine microalbumin-to-creatinine ratio was elevated at 2740 mg/g on 1/15/21 and further increased to 4373 on 4/21/21. Of note, pt. with poorly controlled DM, HbA1C was significantly elevated at 14 in July 2021. Pt. with worsening proteinuria in the setting of poorly controlled DM. Pt. with likely diabetic nephropathy. Check renal panel, spot urine TP/CR ratio today. Continue Losartan for antiproteinuric effect (however change from 50 mg BID to 100 mg once daily). Importance of better glycemic control discussed with the patient. Monitor renal function. Avoid NSAIDs, RCA and nephrotoxins\par \par 2. HTN: BP in acceptable range during clinic visit today. Low salt diet advised. Monitor BP. \par \par RTC: 3 months

## 2021-09-01 NOTE — END OF VISIT
[FreeTextEntry3] : I was physically present for the key portions of the evaluation and management (E/M) service provided. I agree with the above history, ROS, physical exam, assessment and plan which I have reviewed and edited where appropriate. I have also reviewed the assessment and management of CKD, proteinuria and HTN with the patient during clinic visit today.\par \par Pt. with CKD stage 3b. Check labs today (as outlined above). Avoid nephrotoxins.

## 2021-09-01 NOTE — REVIEW OF SYSTEMS
[As Noted in HPI] : as noted in HPI [Lower Ext Edema] : lower extremity edema [Negative] : Heme/Lymph [Fever] : no fever [Chills] : no chills [Eye Pain] : no eye pain [Earache] : no earache [Heart Rate Is Slow] : the heart rate was not slow [Heart Rate Is Fast] : the heart rate was not fast [Chest Pain] : no chest pain [Palpitations] : no palpitations [Shortness Of Breath] : no shortness of breath [Cough] : no cough [SOB on Exertion] : no shortness of breath during exertion [Abdominal Pain] : no abdominal pain [Dysuria] : no dysuria [Incontinence] : no incontinence [Arthralgias] : no arthralgias [Skin Lesions] : no skin lesions [Skin Wound] : no skin wound [Confused] : no confusion [Convulsions] : no convulsions [Dizziness] : no dizziness

## 2021-09-01 NOTE — HISTORY OF PRESENT ILLNESS
[FreeTextEntry1] : 51-year-old female with longstanding history of insulin-dependent DM, sarcoidosis (biopsy proven in 2010), CKD stage 3, proteinuria, seizure disorder and hypercalcemia presented for follow-up visit after ~34 months. Pt. was last seen in nephrology clinic on 10/31/18. \par \par Pt. currently feels well and denies fever, CP, SOB, HA or dizziness. Pt. states she was advised to follow-up in nephrology clinic by her PMD for increasing proteinuria. Pt. says she noted LE edema recently and was initiated on HCTZ. LE edema has improved on diuretic therapy. Pt. gives history of poorly controlled DM. Scr was elevated/stable at 1.41 and serum calcium was WNL (9.5) on recent labs done on 8/9/21.

## 2021-09-01 NOTE — PHYSICAL EXAM
[General Appearance - Alert] : alert [General Appearance - In No Acute Distress] : in no acute distress [General Appearance - Well Nourished] : well nourished [Sclera] : the sclera and conjunctiva were normal [Outer Ear] : the ears and nose were normal in appearance [Jugular Venous Distention Increased] : there was no jugular-venous distention [Respiration, Rhythm And Depth] : normal respiratory rhythm and effort [Auscultation Breath Sounds / Voice Sounds] : lungs were clear to auscultation bilaterally [Heart Sounds] : normal S1 and S2 [Abdomen Soft] : soft [No CVA Tenderness] : no ~M costovertebral angle tenderness [] : no rash [Oriented To Time, Place, And Person] : oriented to person, place, and time [Impaired Insight] : insight and judgment were intact [Affect] : the affect was normal [FreeTextEntry1] : Improved LE edema B/L

## 2021-09-02 ENCOUNTER — NON-APPOINTMENT (OUTPATIENT)
Age: 51
End: 2021-09-02

## 2021-09-24 ENCOUNTER — APPOINTMENT (OUTPATIENT)
Dept: ENDOCRINOLOGY | Facility: CLINIC | Age: 51
End: 2021-09-24

## 2021-10-06 ENCOUNTER — APPOINTMENT (OUTPATIENT)
Dept: PULMONOLOGY | Facility: CLINIC | Age: 51
End: 2021-10-06

## 2021-10-12 ENCOUNTER — NON-APPOINTMENT (OUTPATIENT)
Age: 51
End: 2021-10-12

## 2021-10-13 NOTE — ED ADULT TRIAGE NOTE - RESPIRATORY RATE (BREATHS/MIN)
[facility]  Certificate of Child Health Examination  Student's Name:   Roger Hoffman Birth Date  2007  Sex  male  Race/Ethnicity   School/Grade Level/ID#     Address:   [address] [address]  Parent/Guardian             Telephone#                                            Home:                               Work:   IMMUNIZATIONS: To be completed by health care provider. The mo/da/yr for every dose administered is required. If a specific vaccine is medically contraindicated, a separate written statement must be attached by the health care responsible for completing the health examination explaining the medical reason for the contraindication.      Immunization History   Administered Date(s) Administered   • COVID-19 12Y+ Pfizer-BioNtech - Requires Dilution 05/13/2021, 06/02/2021   • DTaP(INFANRIX) 2009, 2009, 2009, 08/17/2010, 02/11/2013   • HIB, Unspecified Formulation 2009, 2009, 2009, 05/11/2010   • HPV 9-Valent 10/13/2021   • Hep A, ped/adol, 2 dose 02/11/2011, 02/13/2012   • Hep B, adolescent or pediatric 2009, 2009, 2009   • Influenza, Nasal, Unspecified Formulation 10/30/2014   • Influenza, injectable, quadrivalent, preservative free, pediatric 2009, 2009, 09/21/2010, 10/25/2011, 10/26/2012, 10/24/2013, 10/14/2015   • Influenza, injectable, quadrivalent, preservative-free 11/03/2017, 10/10/2018, 10/14/2019, 09/03/2020, 10/13/2021   • Influenza, seasonal, injectable, preservative free 10/31/2016   • MMR 05/11/2010, 02/14/2014   • Meningococcal Conjugate MCV4O (Menveo) 09/03/2020   • Novel Influenza [id number] 2009, 2009   • PPD 08/17/2010, 02/14/2014   • Pneumococcal Conjugate 13 valent 08/17/2010   • Pneumococcal Conjugate 7 Valent 2009, 2009, 2009, 02/12/2010   • Polio, INACTIVATED 2009, 2009, 08/17/2010, 02/11/2013   • Tdap 03/04/2019   • Varicella 02/12/2010, 02/14/2014          Health care provider (MD, , APN, PA, school health professional, health official) verifying above immunization history must sign below. If adding dates to the above immunization history section, put your initials by date(s) and sign here.)  Signature                                                                   Title                                                Date 10/13/21  _____________________________________________________________________________________  Signature                                                                 Title                                                Date  _____________________________________________________________________________________   ALTERNATIVE PROOF OF IMMUNITY   1. Clinical diagnosis (measles, mumps, hepatitis B) is allowed when verified by physician and supported with lab confirmation.  Attach copy of lab result.    * MEASLES (Rubeola)  MO   DA  YR          **MUMPS  MO   DA  YR             HEPATITIS B  MO   DA   YR           VARICELLA  MO   DA  YR      2. History of varicella (chickenpox) disease is acceptable if verified by health care provider, school health professional or health official.  Person signing below verifies that the parent/guardian’s description of varicella disease history is indicative of past infection and is accepting such history as documentation of disease.  Date of Disease                                  Signature                                                           Title   3. Laboratory Evidence of Immunity (check one)      __ Measles*         __ Mumps**        __ Rubella        __Varicella    (Attach copy of lab result)         *All measles cases diagnosed on or after July 1, 2002, must be confirmed by laboratory evidence.      **All mumps cases diagnosed on or after July 1, 2013, must be confirmed by laboratory evidence.     Completion of Alternative 1 or 3 MUST be accompanied by Labs & Physician Signature:  ___________________________  Physician Statements of Immunity MUST be submitted to [facility] for review.     Certificates of Gnosticist Exemption to Immunizations or Physician Medical Statements of Medical Contraindication Are Reviewed   and Maintained by the School Authority     (11/2015)                                         (COMPLETE BOTH SIDES)                                  Approved [facility] SHP 3/2016      Student's Name:  Roger Hoffman Birth Date 2007 Sex male School Grade Level/ID#     Page 2 of 3   HEALTH HISTORY      TO BE COMPLETED AND SIGNED BY PARENT/GUARDIAN AND VERIFIED BY HEALTH CARE PROVIDER  ALLERGIES: (Food, drug, insect, other)  is allergic to dust, oak, and trees.   MEDICATION: (List all prescribed or taken on a regular basis.)     Diagnosis of asthma?  Child wakes during night coughing? Yes    No  Yes    No  Loss of function of one of paired  organs?(eye/ear/kidney/testicle) Yes    No    Birth defects? Yes    No  Hospitalizations? Yes    No    Developmental delay? Yes    No   When? What for? Yes    No    Blood disorders? Hemophilia,  Sickle Cell, Other? Explain. Yes    No  Surgery? (List all.)  When? What for? Yes    No    Diabetes? Yes    No  Serious injury or illness? Yes    No    Head injury/Concussion/Passed out? Yes    No  TB skin test positive (past/present)? * Yes    No *If yes, refer to local [facility] dept   Seizures? What are they like?  Yes    No  TB disease (past or present)? * Yes    No *[facility] yes[facility]   Heart problem/Shortness of breath?  Yes    No  Tobacco use (type, frequency)?  Yes    No    Heart murmur/High blood pressure? Yes    No  Alcohol/Drug use?  Yes    No    Dizziness or chest pain with exercise? Yes    No  Family history of sudden death  before age 50? (Cause?) Yes    No    Eye/Vision problems? ______           __Glasses          __Contacts  Last exam by eye doctor ______  Other concerns? (crossed eye, drooping lids, squinting, difficulty reading)  Dental?   __ Braces     __ Bridge     __ Plate   __Other   Ear/Hearing problems? Yes    No  Information may be shared with appropriate personnel for health and educational purposes.   Bone/Joint problem/injury/scoliosis? Yes    No  Parent/Guardian  Signature                                               Date   PHYSICAL EXAMINATION REQUIREMENTS   Entire section below to be completed by MD//KEAGANN/PA Head Circumference if <2-3 years old - BP (!) 102/51 (BP Location: RUE - Right upper extremity, Patient Position: Sitting, Cuff Size: Regular)   Pulse 75   Temp 97.6 °F (36.4 °C) (Temporal)   Resp 20   Ht 5' 3.25\" (1.607 m)   Wt 44 kg (97 lb 1.6 oz)   BMI 17.06 kg/m²   BSA 1.43 m²    29 %ile (Z= -0.54) based on CDC (Boys, 2-20 Years) BMI-for-age based on BMI available as of 10/13/2021.   DIABETES SCREENING (NOT REQUIRED FOR ) BMI>85% age/sex No     And any two of the following: Family History: No  Ethnic Minority: No    Signs of Insulin Resistance (hypertension, dyslipidemia, polycystic ovarian syndrome, acanthosis nigricans): No  At Risk: No   LEAD RISK QUESTIONNAIRE Required for children age 6 months through 6 years enrolled in licensed or public school operated day care, , nursery school and/or . (Blood test required if resides in [place] or high risk zip code.)  Questionnaire Administered? Yes  Blood Test Indicated? No   Blood Test Date _____   Blood Test Result ______________   TB SKIN OR BLOOD TEST Recommended only for children in high-risk groups including children immunosuppressed due to HIV infection or other conditions, frequent travel to or born in high prevalence countries or those exposed to adults in high-risk categories. See CDC guidelines. http://www.cdc.gov/tb/publications/factsheets/testing/TB_testing.htm.  Test Needed: No     Test performed: No                          Skin Test:    Date Read              /      /             Result:   Positive__      Negative__         mm________                          Blood Test: Date Reported       /      /               Result:  Positive__      Negative__      Value________  LAB TESTS (recommended) Date/Result  Date/Results   Hemoglobin or Hematocrit NA Sickle Cell (when indicated) NA   Urinalysis NA Developmental Screening Tool NA        SYSTEM REVIEW Normal  Comments/Follow-up/Needs  Normal Comments/Follow-up/Needs   Skin  Yes  Endocrine Yes    Ears Yes                  Screening Result Gastrointestinal Yes    Eyes Yes                  Screening Result: Genito-Urinary Yes                                            LMP   Nose Yes  Neurologic Yes    Throat Yes  Musculoskeletal Yes    Mouth/Dental Yes  Spinal Exam Yes    Cardiovascular/HTN Yes  Nutritional status Yes    Respiratory Yes Diagnosis of Asthma: yes   Mental Health Yes    Currently Prescribed Asthma Medication:        No  Quick-relief medication (e.g. Short Acting Beta Antagonist)        No   Controller medication (e.g. inhaled corticosteroid) Other     NEEDS/MODIFICATIONS required in the school setting: No restrictions DIETARY Needs/Restrictions: No restrictions   SPECIAL INSTRUCTIONS/DEVICES e.g. safety glasses, glass eye, chest protector for arrhythmia, pacemaker, prosthetic device, dental bridge, false teeth, athletic support/cup: No restrictions   MENTAL HEALTH/OTHER Is there anything else the school should know about this student?  If you would like to discuss this student’s health with school or school health personnel:  Not needed   EMERGENCY ACTION needed while at school due to child’s health condition (e.g. ,seizures, asthma, insect sting, food, peanut allergy, bleeding problem, diabetes, heart problem)?   No   On the basis of the examination on this day, I approve this child’s participation in                                (If No or Modified please attach explanation.)  PHYSICAL EDUCATION:  Yes                               INTERSCHOLASTIC SPORTS   Yes   Print Name   Leda Cook MD         Signature                                                                         Date: 10/13/21   Address:  [address]  [address]  [facility] [address]  [phone]         (Complete Both Sides)     Approved [facility] 3/2016   [id number]

## 2021-11-01 ENCOUNTER — RESULT CHARGE (OUTPATIENT)
Age: 51
End: 2021-11-01

## 2021-11-01 ENCOUNTER — OUTPATIENT (OUTPATIENT)
Dept: OUTPATIENT SERVICES | Facility: HOSPITAL | Age: 51
LOS: 1 days | End: 2021-11-01
Payer: MEDICARE

## 2021-11-01 ENCOUNTER — APPOINTMENT (OUTPATIENT)
Dept: INTERNAL MEDICINE | Facility: CLINIC | Age: 51
End: 2021-11-01
Payer: MEDICARE

## 2021-11-01 VITALS
HEIGHT: 61 IN | SYSTOLIC BLOOD PRESSURE: 122 MMHG | WEIGHT: 127 LBS | DIASTOLIC BLOOD PRESSURE: 90 MMHG | HEART RATE: 104 BPM | OXYGEN SATURATION: 99 % | BODY MASS INDEX: 23.98 KG/M2

## 2021-11-01 DIAGNOSIS — I10 ESSENTIAL (PRIMARY) HYPERTENSION: ICD-10-CM

## 2021-11-01 LAB — HBA1C MFR BLD HPLC: 13.1

## 2021-11-01 PROCEDURE — 99214 OFFICE O/P EST MOD 30 MIN: CPT | Mod: GC

## 2021-11-01 PROCEDURE — G0463: CPT | Mod: 25

## 2021-11-01 PROCEDURE — 83036 HEMOGLOBIN GLYCOSYLATED A1C: CPT

## 2021-11-01 RX ORDER — HYDROCHLOROTHIAZIDE 12.5 MG/1
12.5 TABLET ORAL DAILY
Qty: 30 | Refills: 0 | Status: DISCONTINUED | COMMUNITY
Start: 2021-08-09 | End: 2021-11-01

## 2021-11-01 NOTE — PHYSICAL EXAM
[No Acute Distress] : no acute distress [No Respiratory Distress] : no respiratory distress  [Normal Rate] : normal rate  [Regular Rhythm] : with a regular rhythm [No Edema] : there was no peripheral edema [Soft] : abdomen soft [Non Tender] : non-tender [Non-distended] : non-distended

## 2021-11-04 NOTE — REVIEW OF SYSTEMS
[Fever] : no fever [Chest Pain] : no chest pain [Abdominal Pain] : no abdominal pain [Nausea] : no nausea [Vomiting] : no vomiting [Dysuria] : no dysuria [FreeTextEntry6] : as above

## 2021-11-04 NOTE — HISTORY OF PRESENT ILLNESS
[FreeTextEntry1] : follow up  [de-identified] : 51F with PMH DM2, HTN, HLD, CKD3 with proteinuria, hypercalcemia 2/2 sarcoidosis\par \par DM2: \par cup of tea in the AM, coffee with milk and small amt of sugar.\par crackers\par lunch is around 12-1. rice, noodles, veggies. not a meat person \par not a snacker\par dinner: roti, rice, noodles, cassava, plantain, sweet potato \par snack: banana, fruits \par \par Uses the freestyle moshe. Fasting in the AM gets values in the 110-130s. As high as 150 if late night snack. Checks after each meal. Values range from 200-400s. Is on prednisone. Last week pulm decreased dose to 10 mg from 15 mg. Missed last appt. Has been on lantus 38 units QHS, for meals she is only on sliding scale. For example if FSG is 200 she will inject 2 units. Does not take regular pre-meal insulin. \par \par Has 10 stairs at home, finds she is sob with stairs or with walking a lot of blocks. Finds that going down on dose exacerbates symptoms.

## 2021-11-04 NOTE — PLAN
[FreeTextEntry1] : #Diabetes mellitus, type 2 \par -previous POCT A1C was 14. uncontrolled FSG based on Freestyle moshe readings. \par -continue with Lantus 38 units. needs pre-meal insulin. start with 4 units pre-meal in addition to correction.\par -Nutrition will call her today to discuss carb counting. will reach out to Dr. White for assistance with converting carbs to insulin regimen. \par -endo referral given\par -needs podiatry referral, will schedule her own ophtho  \par \par #Essential hypertension \par -controlled on amlodipine and losartan 100 QD. \par \par #CKD (chronic kidney disease) stage 3, GFR 30-59 ml/min\par -patient with baseline creatinine around 1.4. Last BMP showing baseline Cr. \par -advised regular follow up with renal \par -on losartan \par \par #Sarcoidosis \par -patient is on chronic steroids but does not follow up with her pulmonologist.\par -continue prednisone as per pulm, most recently dose is 10 mg. \par \par #Seizure disorder \par -no recent seizures\par -given referral to neuro \par -cw AEDs \par \par #HLD (hyperlipidemia) \par -continue with statin\par \par #HCM\par -needs pap, refer to GYN at next visit.\par -given FIT test today\par -she declines both flu shot and COVID vaccination, stressed importance of COVID vaccine. pt states she is hesitant but will think about getting it. \par \par RTC 5 weeks for DM2 MGMT  \par Dw Dr. Dee

## 2021-11-08 ENCOUNTER — OUTPATIENT (OUTPATIENT)
Dept: OUTPATIENT SERVICES | Facility: HOSPITAL | Age: 51
LOS: 1 days | End: 2021-11-08
Payer: MEDICARE

## 2021-11-08 ENCOUNTER — APPOINTMENT (OUTPATIENT)
Dept: INTERNAL MEDICINE | Facility: CLINIC | Age: 51
End: 2021-11-08

## 2021-11-08 DIAGNOSIS — I10 ESSENTIAL (PRIMARY) HYPERTENSION: ICD-10-CM

## 2021-11-08 PROCEDURE — 97802 MEDICAL NUTRITION INDIV IN: CPT

## 2021-11-10 DIAGNOSIS — N18.30 CHRONIC KIDNEY DISEASE, STAGE 3 UNSPECIFIED: ICD-10-CM

## 2021-11-10 DIAGNOSIS — D86.9 SARCOIDOSIS, UNSPECIFIED: ICD-10-CM

## 2021-11-10 DIAGNOSIS — E11.9 TYPE 2 DIABETES MELLITUS WITHOUT COMPLICATIONS: ICD-10-CM

## 2021-11-12 DIAGNOSIS — E11.65 TYPE 2 DIABETES MELLITUS WITH HYPERGLYCEMIA: ICD-10-CM

## 2021-11-19 ENCOUNTER — NON-APPOINTMENT (OUTPATIENT)
Age: 51
End: 2021-11-19

## 2021-11-29 ENCOUNTER — NON-APPOINTMENT (OUTPATIENT)
Age: 51
End: 2021-11-29

## 2021-12-17 ENCOUNTER — NON-APPOINTMENT (OUTPATIENT)
Age: 51
End: 2021-12-17

## 2021-12-22 ENCOUNTER — APPOINTMENT (OUTPATIENT)
Dept: NEPHROLOGY | Facility: CLINIC | Age: 51
End: 2021-12-22

## 2022-02-24 ENCOUNTER — APPOINTMENT (OUTPATIENT)
Dept: ENDOCRINOLOGY | Facility: HOSPITAL | Age: 52
End: 2022-02-24

## 2022-03-15 NOTE — PROGRESS NOTE ADULT - PROBLEM SELECTOR PLAN 4
INDICATION:  

Left leg fractures.



TECHNIQUE: 

Intraoperative fluoroscopic views were obtained during

intramedullary bebe placement for tibial fracture. A total of six

views was obtained in Surgery. 141.5 seconds of fluoroscopy time

was used.



FINDINGS:

The intraoperative views demonstrate comminuted mid shaft

fractures of the tibia and fibula. An intramedullary bebe is seen

in place in the tibial shaft with anatomic alignment of the major

fracture fragments.



IMPRESSION:

Anatomic alignment of the tibial shaft fracture with an

intramedullary bebe in place. A fibular shaft fracture is also

noted.



Dictated by: 



  Dictated on workstation # [id number] Unclear etiology but pt now has two cell lines affected concerning for primary marrow issue  - Hematology c/s: recommending hepatitis panel, uric acid, PTH-rp and CT A/P (HIV -, ) Normcytic/microcytic anemia likely 2/2 insufficient marrow production. Reticulocyte count showing no marrow response.   -Monitor CBC daily  -Will consider alternative diagnoses for pts presentation such as lymphoma,MM

## 2022-04-29 ENCOUNTER — APPOINTMENT (OUTPATIENT)
Dept: INTERNAL MEDICINE | Facility: CLINIC | Age: 52
End: 2022-04-29

## 2022-06-13 ENCOUNTER — OUTPATIENT (OUTPATIENT)
Dept: OUTPATIENT SERVICES | Facility: HOSPITAL | Age: 52
LOS: 1 days | End: 2022-06-13
Payer: MEDICARE

## 2022-06-13 ENCOUNTER — RESULT CHARGE (OUTPATIENT)
Age: 52
End: 2022-06-13

## 2022-06-13 ENCOUNTER — APPOINTMENT (OUTPATIENT)
Dept: INTERNAL MEDICINE | Facility: CLINIC | Age: 52
End: 2022-06-13
Payer: MEDICARE

## 2022-06-13 VITALS
BODY MASS INDEX: 22.09 KG/M2 | HEART RATE: 103 BPM | WEIGHT: 117 LBS | HEIGHT: 61 IN | SYSTOLIC BLOOD PRESSURE: 120 MMHG | DIASTOLIC BLOOD PRESSURE: 70 MMHG | OXYGEN SATURATION: 99 %

## 2022-06-13 DIAGNOSIS — F41.9 ANXIETY DISORDER, UNSPECIFIED: ICD-10-CM

## 2022-06-13 DIAGNOSIS — I10 ESSENTIAL (PRIMARY) HYPERTENSION: ICD-10-CM

## 2022-06-13 LAB — HBA1C MFR BLD HPLC: 12.5

## 2022-06-13 PROCEDURE — 83036 HEMOGLOBIN GLYCOSYLATED A1C: CPT

## 2022-06-13 PROCEDURE — G0463: CPT

## 2022-06-13 PROCEDURE — 99213 OFFICE O/P EST LOW 20 MIN: CPT | Mod: GE

## 2022-06-14 NOTE — PHYSICAL EXAM
[Normal Sclera/Conjunctiva] : normal sclera/conjunctiva [Pedal Pulses Present] : the pedal pulses are present [No Edema] : there was no peripheral edema [Normal] : affect was normal and insight and judgment were intact [de-identified] : TTP in paravertebral muscles of upper, middle and lower back b/l.  [de-identified] : Lower back pain upon knee flexion

## 2022-06-14 NOTE — REVIEW OF SYSTEMS
[Muscle Pain] : muscle pain [Back Pain] : back pain [Fever] : no fever [Chills] : no chills [Chest Pain] : no chest pain [Palpitations] : no palpitations [Shortness Of Breath] : no shortness of breath [Wheezing] : no wheezing [Abdominal Pain] : no abdominal pain [Vomiting] : no vomiting [Dizziness] : no dizziness [Fainting] : no fainting

## 2022-06-14 NOTE — ASSESSMENT
[FreeTextEntry1] : 51F with PMH DM2, HTN, HLD, CKD3 with proteinuria, hypercalcemia 2/2 sarcoidosis, seizure disorder p/w back pain\par \par #Back pain \par -PT referral \par -10 day course of Naproxen \par -Heating pads \par -Rest\par -CMP to r/o electrolyte abnormalities \par \par #T2DM\par -Increase to Metformin ER 1000 qd \par -Start on Jardiance 25mg qd \par -D/c premeal Insulin \par -C/w Basaglar 36 units qhs\par -F/u w/ Pharmacy \par -F/u in 5 weeks to assess medication adherence \par \par #Sarcoidosis \par -F/u w/ Pulm\par \par #Seizure disorder\par -F/u w/ Neuro \par \par #CKD\par -F/u w/ Nephro \par -CMP today\par \par #Anxiety \par -Referral to Dr. Wright \par -Will task SW about transportation issues \par \par \par \par \par -F/u in 5 weeks\par

## 2022-06-14 NOTE — HISTORY OF PRESENT ILLNESS
[Family Member] : family member [FreeTextEntry1] : Back pain  [de-identified] : 51F with PMH DM2, HTN, HLD, CKD3 with proteinuria, hypercalcemia 2/2 sarcoidosis, seizure disorder p/w Back pain\par \par Patient has had back pain for the past 3 weeks. Pain is in mid back, also in upper and lower back pain, has trouble sleeping due to pain. No injury, was not lifting anything heavy. Patient also has pain from R knee down to R foot. Pain is sharp. Back pain feels "like heat." Leg pain "feels like water." Has not tried anything for the pain. Denies fevers, chills, CP, SOB. \par \par A1C 12.5 today. Patient takes Metformin occasionally and take Insulin occasionally. Patient states regimen is confusing, afraid of symptoms of hypoglycemia. Patient takes Basasglar, 36 units qhs, fasting sugar is in 200s. Patient sometimes takes Novolog w/ meals, but often gets hypoglycemic. \par \par Patient also complains of anxiety.

## 2022-06-16 ENCOUNTER — NON-APPOINTMENT (OUTPATIENT)
Age: 52
End: 2022-06-16

## 2022-06-16 ENCOUNTER — RX RENEWAL (OUTPATIENT)
Age: 52
End: 2022-06-16

## 2022-06-16 LAB
25(OH)D3 SERPL-MCNC: 5.3 NG/ML
ALBUMIN SERPL ELPH-MCNC: 3.6 G/DL
ALP BLD-CCNC: 48 U/L
ALT SERPL-CCNC: 8 U/L
ANION GAP SERPL CALC-SCNC: 17 MMOL/L
AST SERPL-CCNC: 10 U/L
BASOPHILS # BLD AUTO: 0.02 K/UL
BASOPHILS NFR BLD AUTO: 0.2 %
BILIRUB SERPL-MCNC: 0.2 MG/DL
BUN SERPL-MCNC: 40 MG/DL
CALCIUM SERPL-MCNC: 8.9 MG/DL
CHLORIDE SERPL-SCNC: 101 MMOL/L
CHOLEST SERPL-MCNC: 375 MG/DL
CO2 SERPL-SCNC: 20 MMOL/L
CREAT SERPL-MCNC: 2.8 MG/DL
CREAT SPEC-SCNC: 145 MG/DL
EGFR: 20 ML/MIN/1.73M2
EOSINOPHIL # BLD AUTO: 0.36 K/UL
EOSINOPHIL NFR BLD AUTO: 4.2 %
ESTIMATED AVERAGE GLUCOSE: 361 MG/DL
GLUCOSE SERPL-MCNC: 161 MG/DL
HBA1C MFR BLD HPLC: 14.2 %
HCT VFR BLD CALC: 32.1 %
HDLC SERPL-MCNC: 47 MG/DL
HGB BLD-MCNC: 10.8 G/DL
IMM GRANULOCYTES NFR BLD AUTO: 0.5 %
LDLC SERPL CALC-MCNC: NORMAL MG/DL
LYMPHOCYTES # BLD AUTO: 2.38 K/UL
LYMPHOCYTES NFR BLD AUTO: 27.8 %
MAN DIFF?: NORMAL
MCHC RBC-ENTMCNC: 27 PG
MCHC RBC-ENTMCNC: 33.6 GM/DL
MCV RBC AUTO: 80.3 FL
MICROALBUMIN 24H UR DL<=1MG/L-MCNC: 804 MG/DL
MICROALBUMIN/CREAT 24H UR-RTO: 5553 MG/G
MONOCYTES # BLD AUTO: 0.63 K/UL
MONOCYTES NFR BLD AUTO: 7.4 %
NEUTROPHILS # BLD AUTO: 5.13 K/UL
NEUTROPHILS NFR BLD AUTO: 59.9 %
NONHDLC SERPL-MCNC: 328 MG/DL
PLATELET # BLD AUTO: 268 K/UL
POTASSIUM SERPL-SCNC: 4.3 MMOL/L
PROT SERPL-MCNC: 6.2 G/DL
RBC # BLD: 4 M/UL
RBC # FLD: 12.4 %
SODIUM SERPL-SCNC: 139 MMOL/L
TRIGL SERPL-MCNC: 721 MG/DL
WBC # FLD AUTO: 8.56 K/UL

## 2022-06-17 ENCOUNTER — NON-APPOINTMENT (OUTPATIENT)
Age: 52
End: 2022-06-17

## 2022-06-20 DIAGNOSIS — N18.30 CHRONIC KIDNEY DISEASE, STAGE 3 UNSPECIFIED: ICD-10-CM

## 2022-06-20 DIAGNOSIS — E11.9 TYPE 2 DIABETES MELLITUS WITHOUT COMPLICATIONS: ICD-10-CM

## 2022-06-20 DIAGNOSIS — M54.9 DORSALGIA, UNSPECIFIED: ICD-10-CM

## 2022-06-28 ENCOUNTER — NON-APPOINTMENT (OUTPATIENT)
Age: 52
End: 2022-06-28

## 2022-07-21 ENCOUNTER — APPOINTMENT (OUTPATIENT)
Dept: INTERNAL MEDICINE | Facility: CLINIC | Age: 52
End: 2022-07-21

## 2022-07-27 ENCOUNTER — OUTPATIENT (OUTPATIENT)
Dept: OUTPATIENT SERVICES | Facility: HOSPITAL | Age: 52
LOS: 1 days | End: 2022-07-27

## 2022-07-27 ENCOUNTER — APPOINTMENT (OUTPATIENT)
Dept: NEPHROLOGY | Facility: CLINIC | Age: 52
End: 2022-07-27

## 2022-07-27 VITALS
BODY MASS INDEX: 22.47 KG/M2 | HEART RATE: 80 BPM | RESPIRATION RATE: 18 BRPM | TEMPERATURE: 97.3 F | SYSTOLIC BLOOD PRESSURE: 206 MMHG | HEIGHT: 61 IN | DIASTOLIC BLOOD PRESSURE: 104 MMHG | WEIGHT: 119 LBS | OXYGEN SATURATION: 97 %

## 2022-07-27 VITALS — SYSTOLIC BLOOD PRESSURE: 200 MMHG | DIASTOLIC BLOOD PRESSURE: 100 MMHG

## 2022-07-27 DIAGNOSIS — N18.30 CHRONIC KIDNEY DISEASE, STAGE 3 UNSPECIFIED: ICD-10-CM

## 2022-07-27 DIAGNOSIS — I10 ESSENTIAL (PRIMARY) HYPERTENSION: ICD-10-CM

## 2022-07-27 DIAGNOSIS — N18.4 CHRONIC KIDNEY DISEASE, STAGE 4 (SEVERE): ICD-10-CM

## 2022-07-27 DIAGNOSIS — R80.9 PROTEINURIA, UNSPECIFIED: ICD-10-CM

## 2022-07-27 LAB
ALBUMIN SERPL ELPH-MCNC: 3.2 G/DL
ANION GAP SERPL CALC-SCNC: 11 MMOL/L
BUN SERPL-MCNC: 42 MG/DL
CALCIUM SERPL-MCNC: 8.5 MG/DL
CHLORIDE SERPL-SCNC: 105 MMOL/L
CO2 SERPL-SCNC: 21 MMOL/L
CREAT SERPL-MCNC: 2.91 MG/DL
CREAT SPEC-SCNC: 45 MG/DL
CREAT/PROT UR: 11.1 RATIO
EGFR: 19 ML/MIN/1.73M2
GLUCOSE SERPL-MCNC: 443 MG/DL
PHOSPHATE SERPL-MCNC: 5.8 MG/DL
POTASSIUM SERPL-SCNC: 5.2 MMOL/L
PROT UR-MCNC: 496 MG/DL
SODIUM SERPL-SCNC: 137 MMOL/L

## 2022-07-27 PROCEDURE — 99214 OFFICE O/P EST MOD 30 MIN: CPT | Mod: GC

## 2022-07-27 RX ORDER — LACOSAMIDE 150 MG/1
150 TABLET, FILM COATED ORAL
Qty: 60 | Refills: 4 | Status: DISCONTINUED | COMMUNITY
Start: 2017-12-27 | End: 2022-07-27

## 2022-07-27 RX ORDER — METFORMIN HYDROCHLORIDE 1000 MG/1
1000 TABLET, FILM COATED, EXTENDED RELEASE ORAL
Qty: 90 | Refills: 3 | Status: DISCONTINUED | COMMUNITY
Start: 2021-08-09 | End: 2022-07-27

## 2022-07-27 RX ORDER — ERGOCALCIFEROL 1.25 MG/1
1.25 MG CAPSULE ORAL
Qty: 8 | Refills: 0 | Status: DISCONTINUED | COMMUNITY
Start: 2021-04-28 | End: 2022-07-27

## 2022-07-27 RX ORDER — PHENYTOIN SODIUM 100 MG/1
100 CAPSULE ORAL
Qty: 90 | Refills: 3 | Status: DISCONTINUED | COMMUNITY
Start: 2017-12-27 | End: 2022-07-27

## 2022-07-27 NOTE — PHYSICAL EXAM
[] : no respiratory distress [Heart Sounds Gallop] : no gallops [Heart Sounds Pericardial Friction Rub] : no pericardial rub [Edema] : there was no peripheral edema [Abdomen Tenderness] : non-tender [Nail Clubbing] : no clubbing  or cyanosis of the fingernails [Mood] : the mood was normal [Affect] : the affect was normal

## 2022-07-27 NOTE — REVIEW OF SYSTEMS
[As Noted in HPI] : as noted in HPI [Fever] : no fever [Chills] : no chills [Eye Pain] : no eye pain [Earache] : no earache [Heart Rate Is Slow] : the heart rate was not slow [Heart Rate Is Fast] : the heart rate was not fast [Chest Pain] : no chest pain [Palpitations] : no palpitations [Lower Ext Edema] : no lower extremity edema [Shortness Of Breath] : no shortness of breath [Cough] : no cough [SOB on Exertion] : no shortness of breath during exertion [Abdominal Pain] : no abdominal pain [Dysuria] : no dysuria [Incontinence] : no incontinence [Arthralgias] : no arthralgias [Skin Lesions] : no skin lesions [Skin Wound] : no skin wound [Confused] : no confusion [Convulsions] : no convulsions [Dizziness] : no dizziness [Depression] : no depression [Muscle Weakness] : no muscle weakness [FreeTextEntry5] : intermittent LE edema. No LE edema at present.

## 2022-07-27 NOTE — ASSESSMENT
[FreeTextEntry1] : 1. CKD Stage 4/Proteinuria: Pt. with previous history of MIKALA in the setting of hypercalcemia. Pt was initially seen during her hospitalization at [facility] from 09/25/17 to 10/03/17. At that time, the patient was admitted with Scr of 5.65 and serum calcium of 15.3 on 09/25/17. Scr improved to 3.48 and serum calcium decreased to 10.7 on 10/03/17. Scr improved 1.44 and serum calcium is WNL at 10.1 on labs done on 9/24/18. Scr remained elevated/stable at 1.41 on 8/9/2021. Recent Scr increased to 2.4 on 6/13/2022. Pt. with nephrotic range proteinuria in setting of poorly controlled DM (HbA1C was significantly elevated at 14.2 in July 2022). Pt. with likely diabetic nephropathy. Check renal panel and spot urine TP/CR ratio today. Continue Losartan for antiproteinuric effect. Advised to discontinue metformin (due to worsening kidney function). Importance of better glycemic and BP control discussed with the patient. Monitor kidney function. Avoid NSAIDs, RCA and nephrotoxins\par \par 2. HTN: BP elevated during clinic visit today. Pt. says she did not take her BP meds today. Pt. advised to take her BP meds as soon as she reaches home. Pt. advised to be compliant to her BP meds.Low salt diet advised. Monitor BP daily at home. \par \par RTC: 1 week

## 2022-07-27 NOTE — HISTORY OF PRESENT ILLNESS
[FreeTextEntry1] : 51-year-old female with longstanding history of insulin-dependent DM, sarcoidosis (biopsy proven in 2010), CKD, proteinuria, seizure disorder and hypercalcemia presented for follow-up visit after ~11 months. Pt. was last seen in nephrology clinic on 9/1/21. \par \par Pt. currently feels well and denies fever, CP, SOB, HA or dizziness. Pt. found to have elevated BP reading in clinic today. Pt. says she do not take her BP mediations today morning. SBP ranges in 140-150s at home (as per pt.). Pt. gives history of poorly controlled DM. Pt. gives history of intermittent LE swelling. Pt. however with no LE swelling at present. Denies chest pain, shortness of breath, palpitations, headaches, dizziness, nausea, vomiting, dysuria, hematuria, changes in urination. Scr increased to 2.8 and serum calcium was WNL (8.9) on recent labs done on 6/13/22.

## 2022-07-27 NOTE — END OF VISIT
[FreeTextEntry3] : I was physically present for the key portions of the evaluation and management (E/M) service provided. I agree with the above history, ROS, physical exam, assessment and plan which I have reviewed and edited where appropriate. I have also reviewed the assessment and management of CKD, proteinuria and HTN with the patient during clinic visit today.\par \par Pt. with advanced CKD stage 4. Recent Scr level increased to 2.8 in June 2022. Check labs today (as outlined above). Importance of better BP and glycemic control reviewed with patient during clinic visit today. Avoid nephrotoxins.

## 2022-07-28 ENCOUNTER — NON-APPOINTMENT (OUTPATIENT)
Age: 52
End: 2022-07-28

## 2022-08-03 ENCOUNTER — APPOINTMENT (OUTPATIENT)
Dept: NEPHROLOGY | Facility: CLINIC | Age: 52
End: 2022-08-03

## 2022-08-03 ENCOUNTER — OUTPATIENT (OUTPATIENT)
Dept: OUTPATIENT SERVICES | Facility: HOSPITAL | Age: 52
LOS: 1 days | End: 2022-08-03

## 2022-08-03 VITALS
TEMPERATURE: 97.7 F | BODY MASS INDEX: 22.51 KG/M2 | DIASTOLIC BLOOD PRESSURE: 78 MMHG | SYSTOLIC BLOOD PRESSURE: 134 MMHG | RESPIRATION RATE: 16 BRPM | OXYGEN SATURATION: 100 % | WEIGHT: 119.25 LBS | HEART RATE: 88 BPM | HEIGHT: 61 IN

## 2022-08-03 DIAGNOSIS — R60.0 LOCALIZED EDEMA: ICD-10-CM

## 2022-08-03 DIAGNOSIS — I10 ESSENTIAL (PRIMARY) HYPERTENSION: ICD-10-CM

## 2022-08-03 DIAGNOSIS — N18.4 CHRONIC KIDNEY DISEASE, STAGE 4 (SEVERE): ICD-10-CM

## 2022-08-03 DIAGNOSIS — R80.9 PROTEINURIA, UNSPECIFIED: ICD-10-CM

## 2022-08-03 PROCEDURE — 99214 OFFICE O/P EST MOD 30 MIN: CPT

## 2022-08-03 RX ORDER — NAPROXEN 500 MG/1
500 TABLET ORAL
Qty: 10 | Refills: 0 | Status: DISCONTINUED | COMMUNITY
Start: 2022-06-13 | End: 2022-08-03

## 2022-08-03 RX ORDER — METFORMIN ER 500 MG 500 MG/1
500 TABLET ORAL
Qty: 180 | Refills: 3 | Status: DISCONTINUED | COMMUNITY
Start: 2022-06-16 | End: 2022-08-03

## 2022-08-03 NOTE — ASSESSMENT
[FreeTextEntry1] : 1. CKD Stage 4/Proteinuria: Pt. with previous history of MIKALA in the setting of hypercalcemia. Pt was initially seen during her hospitalization at [facility] from 9/25/17 to 10/3/17. At that time, the patient was admitted with Scr of 5.65 and serum calcium of 15.3 on 09/25/17. Scr improved to 3.48 and serum calcium decreased to 10.7 on 10/03/17. Scr improved 1.44 and serum calcium is WNL at 10.1 on labs done on 9/24/18. Scr remained stable at 1.41 on 8/9/2021. Scr increased to 2.4 on 6/13/22. Scr increased further to 2.91 on labs done last week on 7/27/22. Pt. with significant/nephrotic range proteinuria in setting of poorly controlled DM (HbA1C was significantly elevated at 14.2 in July 2022). Pt. with most likely diabetic nephropathy. Continue Losartan for antiproteinuric effect. Importance of better glycemic and BP control discussed with the patient and her daughter today. Monitor kidney function. Avoid NSAIDs, RCA and nephrotoxins\par \par 2. HTN: BP in acceptable range during clinic visit today. Continue with current BP meds. Low salt diet advised. Monitor BP daily at home. \par \par 3. LE edema: Pt. with B/L LE pitting edema on exam today. Add oral Torsemide 10 mg once daily. Low salt diet and fluid restriction advised. Monitor body weight.  \par \par RTC: 6-8 weeks.

## 2022-08-03 NOTE — PHYSICAL EXAM
[General Appearance - Alert] : alert [General Appearance - In No Acute Distress] : in no acute distress [General Appearance - Well Nourished] : well nourished [Sclera] : the sclera and conjunctiva were normal [Outer Ear] : the ears and nose were normal in appearance [Jugular Venous Distention Increased] : there was no jugular-venous distention [Respiration, Rhythm And Depth] : normal respiratory rhythm and effort [Auscultation Breath Sounds / Voice Sounds] : lungs were clear to auscultation bilaterally [Heart Sounds] : normal S1 and S2 [Heart Sounds Gallop] : no gallops [Heart Sounds Pericardial Friction Rub] : no pericardial rub [Abdomen Soft] : soft [Abdomen Tenderness] : non-tender [No CVA Tenderness] : no ~M costovertebral angle tenderness [Nail Clubbing] : no clubbing  or cyanosis of the fingernails [] : no rash [Oriented To Time, Place, And Person] : oriented to person, place, and time [Affect] : the affect was normal [Mood] : the mood was normal [FreeTextEntry1] : B/L LE: pitting edema present on exam today

## 2022-08-03 NOTE — HISTORY OF PRESENT ILLNESS
[FreeTextEntry1] : 51-year-old female with longstanding history of insulin-dependent DM, sarcoidosis (biopsy proven in 2010), CKD, proteinuria, seizure disorder and hypercalcemia presented for follow-up visit. Pt. was seen in clinic last week on 7/27/22.   \par \par Pt. currently feels well but gives history of B/L LE edema. No fever, CP, SOB, HA or dizziness during clinic visit today. Pt. admits compliance to her BP mediations. SBP ranges in 140-150s at home (as per pt.).

## 2022-08-03 NOTE — REVIEW OF SYSTEMS
[As Noted in HPI] : as noted in HPI [Negative] : Heme/Lymph [Lower Ext Edema] : lower extremity edema [Limb Swelling] : limb swelling [Fever] : no fever [Eye Pain] : no eye pain [Earache] : no earache [Heart Rate Is Slow] : the heart rate was not slow [Heart Rate Is Fast] : the heart rate was not fast [Chest Pain] : no chest pain [Palpitations] : no palpitations [Shortness Of Breath] : no shortness of breath [Cough] : no cough [SOB on Exertion] : no shortness of breath during exertion [Abdominal Pain] : no abdominal pain [Dysuria] : no dysuria [Incontinence] : no incontinence [Arthralgias] : no arthralgias [Skin Lesions] : no skin lesions [Skin Wound] : no skin wound [Confused] : no confusion [Convulsions] : no convulsions [Dizziness] : no dizziness [Depression] : no depression [Muscle Weakness] : no muscle weakness

## 2022-08-17 ENCOUNTER — APPOINTMENT (OUTPATIENT)
Dept: NEPHROLOGY | Facility: CLINIC | Age: 52
End: 2022-08-17

## 2022-08-19 NOTE — CHART NOTE - NSCHARTNOTEFT_GEN_A_CORE
Spoke with pt informed referral was placed for eye specialist   comfortable in bed  normal respirations  regular rhythm  abdomen not distended  no edema    Plaquenil for COVID pneumonia  Tylenol prn fever  Monitor oxygen saturation and wean off oxygen as tolerated via NC    Poorly controlled Type 2 DM likely due to medication non-compliance.  adjust insulin dose daily as appropriate based on glucose levels.     CKD III: avoid nephrotoxics. monitor intake and output.     Stable clinical status currently, but pre-existing lung disease and poorly managed co-morbid conditions make her high risk for further morbidity and mortality.  Patient reports that her daughter is also admitted due to COVID.  I spoke to her and updated her on plan of care.    plan of care discussed with floor NP/PA

## 2022-09-12 ENCOUNTER — RESULT CHARGE (OUTPATIENT)
Age: 52
End: 2022-09-12

## 2022-09-12 ENCOUNTER — OUTPATIENT (OUTPATIENT)
Dept: OUTPATIENT SERVICES | Facility: HOSPITAL | Age: 52
LOS: 1 days | End: 2022-09-12
Payer: MEDICARE

## 2022-09-12 ENCOUNTER — APPOINTMENT (OUTPATIENT)
Dept: INTERNAL MEDICINE | Facility: CLINIC | Age: 52
End: 2022-09-12

## 2022-09-12 VITALS
SYSTOLIC BLOOD PRESSURE: 120 MMHG | OXYGEN SATURATION: 98 % | DIASTOLIC BLOOD PRESSURE: 90 MMHG | HEIGHT: 61 IN | HEART RATE: 91 BPM | BODY MASS INDEX: 21.71 KG/M2 | WEIGHT: 115 LBS

## 2022-09-12 DIAGNOSIS — M25.562 PAIN IN LEFT KNEE: ICD-10-CM

## 2022-09-12 DIAGNOSIS — Z00.00 ENCOUNTER FOR GENERAL ADULT MEDICAL EXAMINATION WITHOUT ABNORMAL FINDINGS: ICD-10-CM

## 2022-09-12 DIAGNOSIS — I10 ESSENTIAL (PRIMARY) HYPERTENSION: ICD-10-CM

## 2022-09-12 PROCEDURE — G0439: CPT

## 2022-09-12 PROCEDURE — 80061 LIPID PANEL: CPT

## 2022-09-12 PROCEDURE — G0439: CPT | Mod: GC

## 2022-09-12 PROCEDURE — 80053 COMPREHEN METABOLIC PANEL: CPT

## 2022-09-12 PROCEDURE — 83036 HEMOGLOBIN GLYCOSYLATED A1C: CPT

## 2022-09-12 PROCEDURE — 85025 COMPLETE CBC W/AUTO DIFF WBC: CPT

## 2022-09-12 RX ORDER — PANTOPRAZOLE 40 MG/1
40 TABLET, DELAYED RELEASE ORAL
Qty: 90 | Refills: 3 | Status: COMPLETED | COMMUNITY
Start: 2019-10-04 | End: 2022-09-12

## 2022-09-13 ENCOUNTER — NON-APPOINTMENT (OUTPATIENT)
Age: 52
End: 2022-09-13

## 2022-09-13 LAB
ALBUMIN SERPL ELPH-MCNC: 4.1 G/DL
ALP BLD-CCNC: 48 U/L
ALT SERPL-CCNC: 7 U/L
ANION GAP SERPL CALC-SCNC: 11 MMOL/L
AST SERPL-CCNC: 10 U/L
BASOPHILS # BLD AUTO: 0.03 K/UL
BASOPHILS NFR BLD AUTO: 0.5 %
BILIRUB SERPL-MCNC: 0.2 MG/DL
BUN SERPL-MCNC: 45 MG/DL
CALCIUM SERPL-MCNC: 9.2 MG/DL
CHLORIDE SERPL-SCNC: 103 MMOL/L
CHOLEST SERPL-MCNC: 244 MG/DL
CO2 SERPL-SCNC: 22 MMOL/L
CREAT SERPL-MCNC: 3.36 MG/DL
EGFR: 16 ML/MIN/1.73M2
EOSINOPHIL # BLD AUTO: 0.4 K/UL
EOSINOPHIL NFR BLD AUTO: 6.1 %
GLUCOSE SERPL-MCNC: 306 MG/DL
HBA1C MFR BLD HPLC: 9.9
HCT VFR BLD CALC: 32.1 %
HDLC SERPL-MCNC: 41 MG/DL
HGB BLD-MCNC: 10.5 G/DL
IMM GRANULOCYTES NFR BLD AUTO: 0.3 %
LDLC SERPL CALC-MCNC: 126 MG/DL
LYMPHOCYTES # BLD AUTO: 2.01 K/UL
LYMPHOCYTES NFR BLD AUTO: 30.7 %
MAN DIFF?: NORMAL
MCHC RBC-ENTMCNC: 27.1 PG
MCHC RBC-ENTMCNC: 32.7 GM/DL
MCV RBC AUTO: 82.9 FL
MONOCYTES # BLD AUTO: 0.41 K/UL
MONOCYTES NFR BLD AUTO: 6.3 %
NEUTROPHILS # BLD AUTO: 3.68 K/UL
NEUTROPHILS NFR BLD AUTO: 56.1 %
NONHDLC SERPL-MCNC: 204 MG/DL
PLATELET # BLD AUTO: 244 K/UL
POTASSIUM SERPL-SCNC: 5.5 MMOL/L
PROT SERPL-MCNC: 6.9 G/DL
RBC # BLD: 3.87 M/UL
RBC # FLD: 12.5 %
SODIUM SERPL-SCNC: 136 MMOL/L
TRIGL SERPL-MCNC: 390 MG/DL
WBC # FLD AUTO: 6.55 K/UL

## 2022-09-13 RX ORDER — GABAPENTIN 300 MG/1
300 CAPSULE ORAL TWICE DAILY
Qty: 60 | Refills: 1 | Status: DISCONTINUED | COMMUNITY
Start: 2019-08-09 | End: 2022-09-13

## 2022-09-13 RX ORDER — AMLODIPINE BESYLATE 5 MG/1
5 TABLET ORAL DAILY
Qty: 30 | Refills: 3 | Status: DISCONTINUED | COMMUNITY
Start: 2018-01-24 | End: 2022-09-13

## 2022-09-13 RX ORDER — AMLODIPINE BESYLATE 10 MG/1
10 TABLET ORAL
Qty: 90 | Refills: 3 | Status: DISCONTINUED | COMMUNITY
Start: 2022-09-13 | End: 2022-09-13

## 2022-09-22 ENCOUNTER — APPOINTMENT (OUTPATIENT)
Dept: RADIOLOGY | Facility: IMAGING CENTER | Age: 52
End: 2022-09-22

## 2022-09-22 ENCOUNTER — OUTPATIENT (OUTPATIENT)
Dept: OUTPATIENT SERVICES | Facility: HOSPITAL | Age: 52
LOS: 1 days | End: 2022-09-22
Payer: MEDICARE

## 2022-09-22 DIAGNOSIS — M54.9 DORSALGIA, UNSPECIFIED: ICD-10-CM

## 2022-09-22 PROCEDURE — 72070 X-RAY EXAM THORAC SPINE 2VWS: CPT

## 2022-09-22 PROCEDURE — 72100 X-RAY EXAM L-S SPINE 2/3 VWS: CPT

## 2022-09-22 PROCEDURE — 72070 X-RAY EXAM THORAC SPINE 2VWS: CPT | Mod: 26

## 2022-09-22 PROCEDURE — 72100 X-RAY EXAM L-S SPINE 2/3 VWS: CPT | Mod: 26

## 2022-09-28 ENCOUNTER — APPOINTMENT (OUTPATIENT)
Dept: NEPHROLOGY | Facility: CLINIC | Age: 52
End: 2022-09-28

## 2022-10-05 NOTE — PLAN
[FreeTextEntry1] : 52-year-old female with longstanding history of insulin-dependent DM, sarcoidosis (biopsy proven in 2010), CKD, proteinuria, and hypercalcemia presented for CPE. \par \par #Back Pain\par -Chronic back pain with acute worsening spinal point tenderness with radiating pain, worse with mvt, as well as paraspinal tenderness. DDx muscle pain/spasm, herniated disc, compression fracture given long tern steroid use. Lumbar spine Xray ordered, consider MRI if normal.\par -Tylenol 650 mg q8 for 2 weeks, no NSAIDs/Flexeril given GFR\par -Lidocaine patch\par \par #Sarcoidosis\par -Off prednisone for 1 week after running out of medications\par -Held off on restarting prednisone 10 mg given concern for possible compression fracture\par -Tasking [facility] for Pulm appointment as pt struggling to make appt\par \par #Knee pain\par -DDX patellofemoral syndrome, bursitis. Can consider gout since started on a diuretic but no evidence of swelling/warmth/erythema noted\par -Tylenol and Lidocaine patches as above\par \par #CKD, worsening\par -Repeat CMP\par -Renally dose Jardiance to 10 mg QD\par -Renally dose Gabapentin to 100 mg TID max\par -D/C PPI, replace with Famotidine\par -Patient on ACE-I for proteinuria and Torsemide for b/l LE edema but has worsening kidney function and low BP in AM. Will consider holding or lowering dosage pending CMP.\par -Attempt to add on CK\par -Encouraged patient to go to scheduled renal appointment on 9/28\par \par #T2DM\par -POCT A1C 9.9, from >14. Unclear if accurate\par -Gave patient booklet to record blood glucose pre-meals, and pt endorses hyperglycemic/hypoglycemic episodes\par -Start Trulicity 0.75 mg qweekly\par -Continue Basaglar 32 U at bedtime\par -Continue renally dosed Jardiance\par -Endo referral\par \par #BP\par -Endorses hypotensive episodes after taking BP meds to systolic 80-90s in the AM, resolves 1-2 hours later. Normotensive in clinic today. \par -Could be 2/2 medication dosage too high. Possible adrenal insufficiency due to long term steroids, or being off long term steroid regimen\par -Will space out BP meds to reduce HoTN\par -Will add on ACTH, cortisol, renin, aldosterone. If not able, draw at next visit\par -C/w Amlodipine 10 mg and Losartan 100 mg OD\par \par #HCM\par -Pending Flu shot, Prevnar and Shingles. Patient in pain, deferring at that time. Refusing COVID vaccine\par -Cancer screenings at next visit\par -Referral to Health Home given multiple comorbidities. Task sent to KIRSTEN Nash\par -To fill out forms for disability, patient to

## 2022-10-05 NOTE — PHYSICAL EXAM
[Well Nourished] : well nourished [Normal Sclera/Conjunctiva] : normal sclera/conjunctiva [PERRL] : pupils equal round and reactive to light [No JVD] : no jugular venous distention [No Lymphadenopathy] : no lymphadenopathy [Supple] : supple [Thyroid Normal, No Nodules] : the thyroid was normal and there were no nodules present [No Respiratory Distress] : no respiratory distress  [Clear to Auscultation] : lungs were clear to auscultation bilaterally [Normal Rate] : normal rate  [Regular Rhythm] : with a regular rhythm [Normal S1, S2] : normal S1 and S2 [Pedal Pulses Present] : the pedal pulses are present [No Edema] : there was no peripheral edema [Soft] : abdomen soft [Non Tender] : non-tender [Non-distended] : non-distended [Normal Bowel Sounds] : normal bowel sounds [Normal] : normal gait, coordination grossly intact, no focal deficits and deep tendon reflexes were 2+ and symmetric [None] : no ulcers in either foot were found [] : right foot [Kyphosis] : no kyphosis [Scoliosis] : no scoliosis [de-identified] : Mild discomfort 2/2 pain [de-identified] : spinal tenderness along thoracic and lumbar spine, paraspinal tenderness, straight leg test positive on left side [de-identified] : Left knee diffuse tenderness w/o swelling, warmth, erythema. Pain with anterior/posterior drawer test, Dev/Lachman test. ROM intact with active and passive motion.

## 2022-10-05 NOTE — HISTORY OF PRESENT ILLNESS
[Family Member] : family member [FreeTextEntry1] : CPE [de-identified] : 52-year-old female with longstanding history of insulin-dependent DM, sarcoidosis (biopsy proven in 2010), CKD, proteinuria and hypercalcemia presented for CPE.\par \par Endorses worsening back pain. Has had chronic back for "years" but states it has worsening acutely in last 5 days. Came on quickly, starting in middle of back radiating across lower back, down her left hip and leg. Described as a severe ache "hot, burning pain." Also has had left knee pain in last 5 days as well, described as a constant ache. Both pains worsen with movement, improve with rest and lying down. Was able to have some relief with Tylenol 650 mg last night but has returned since. Denies numbness, weakness, paresthesias in legs. Has chronic numbness and tingling in her feet from diabetes but unchanged. \par \par Also endorses low blood pressure and lightheadedness/dizziness after taking BP meds in the AM for a few weeks. Checks her blood pressure at that time and is systolic 80-90s. Has been occurring for "months." Improves after she drinks water/eats something salty.\par \par For pt's diabetes, does not check FSG regularly, only when she feels excessively thirsty or lightheaded/sweaty, expecting her blood sugar will be very high or low, respectively. States these sensations occur multiple times a week but unable to state when or if after taking insulin since she does not write the numbers down after checking the [employer]. Takes her insulin at bedtime and Jardiance as prescribed. No longer on pre-meal insulin due to recurrent hypoglycemic episodes after taking it. \par \par Unable to make appointment with pulmonologist. Therefore, has been off prednisone for at least 2 weeks.

## 2022-10-19 ENCOUNTER — APPOINTMENT (OUTPATIENT)
Dept: NEPHROLOGY | Facility: CLINIC | Age: 52
End: 2022-10-19

## 2022-10-20 ENCOUNTER — APPOINTMENT (OUTPATIENT)
Dept: INTERNAL MEDICINE | Facility: CLINIC | Age: 52
End: 2022-10-20

## 2022-10-26 ENCOUNTER — APPOINTMENT (OUTPATIENT)
Dept: INTERNAL MEDICINE | Facility: CLINIC | Age: 52
End: 2022-10-26

## 2022-10-26 ENCOUNTER — RESULT REVIEW (OUTPATIENT)
Age: 52
End: 2022-10-26

## 2022-10-26 ENCOUNTER — OUTPATIENT (OUTPATIENT)
Dept: OUTPATIENT SERVICES | Facility: HOSPITAL | Age: 52
LOS: 1 days | End: 2022-10-26
Payer: MEDICARE

## 2022-10-26 DIAGNOSIS — M25.561 PAIN IN RIGHT KNEE: ICD-10-CM

## 2022-10-26 DIAGNOSIS — I10 ESSENTIAL (PRIMARY) HYPERTENSION: ICD-10-CM

## 2022-10-26 PROCEDURE — G0463: CPT

## 2022-10-26 PROCEDURE — 99442: CPT | Mod: 95

## 2022-10-26 NOTE — ASSESSMENT
[FreeTextEntry1] : 52-year-old female with longstanding history of insulin-dependent DM, sarcoidosis (biopsy proven in 2010), CKD, proteinuria, and hypercalcemia, presents via telephonic visit for 3-4 week h/o LLE pain originating from buttocks radiating down to toes, suspicious for radiculopathy vs avascular necrosis and referred pain given chronic steroid use.

## 2022-10-26 NOTE — PLAN
[FreeTextEntry1] : # LLE pain\par - DDx: lumbar radiculopathy vs avascular necrosis vs OA \par - No concern for cord compression/cauda equina syndrome at this time \par - Xray left hip and left knee \par - C/w tylenol icy hot and voltaren gel \par - Increase GPN to 200mg TID (eGFR noted) \par - Add tramadol 25-50mg q12 PRN (7 day supply) for severe pain \par - RTC at earliest convenience for in-person exam \par \par Care discussed with Dr. Hanley.\par \par Lisha Hess, PGY3\par [facility]\par

## 2022-10-26 NOTE — HISTORY OF PRESENT ILLNESS
[Home] : at home, [unfilled] , at the time of the visit. [Medical Office: ([facility])___] : at the medical office located in  [Verbal consent obtained from patient] : the patient, [unfilled] [FreeTextEntry8] : 52-year-old female with longstanding history of insulin-dependent DM, sarcoidosis (biopsy proven in 2010), CKD, proteinuria, and hypercalcemia, presents via telephonic visit for 3-4 week h/o LLE pain. \par \par She reports pain in the entire left lower extremity, maximally in left knee and radiates from waist to buttcheeks and all the way down to toes. She reports sharp quality in pain, denies associated numbness, tingling, swelling, erythema. She is having difficulty sleeping at times due to pain, and she feels that her ambulation is beginning to be affected as well. She denies bowel/urinary incontinence, weakness in legs, or sensory changes. \par \par For pain control, she has been using tylenol and icy hot. She was previously prescribed voltaren gel for back pain and has been adding that onto her knee as well.

## 2022-10-26 NOTE — REVIEW OF SYSTEMS
[Back Pain] : back pain [Joint Stiffness] : no joint stiffness [Joint Swelling] : no joint swelling [Muscle Weakness] : no muscle weakness [FreeTextEntry9] : Left thigh pain radiating down to toes

## 2022-11-01 ENCOUNTER — OUTPATIENT (OUTPATIENT)
Dept: OUTPATIENT SERVICES | Facility: HOSPITAL | Age: 52
LOS: 1 days | End: 2022-11-01
Payer: MEDICARE

## 2022-11-01 ENCOUNTER — APPOINTMENT (OUTPATIENT)
Dept: INTERNAL MEDICINE | Facility: CLINIC | Age: 52
End: 2022-11-01

## 2022-11-01 VITALS
HEART RATE: 95 BPM | BODY MASS INDEX: 21.71 KG/M2 | HEIGHT: 61 IN | SYSTOLIC BLOOD PRESSURE: 144 MMHG | OXYGEN SATURATION: 99 % | WEIGHT: 115 LBS | DIASTOLIC BLOOD PRESSURE: 100 MMHG

## 2022-11-01 DIAGNOSIS — M79.605 DORSALGIA, UNSPECIFIED: ICD-10-CM

## 2022-11-01 DIAGNOSIS — I10 ESSENTIAL (PRIMARY) HYPERTENSION: ICD-10-CM

## 2022-11-01 DIAGNOSIS — M54.9 DORSALGIA, UNSPECIFIED: ICD-10-CM

## 2022-11-01 PROCEDURE — 99213 OFFICE O/P EST LOW 20 MIN: CPT | Mod: GE

## 2022-11-02 DIAGNOSIS — M54.9 DORSALGIA, UNSPECIFIED: ICD-10-CM

## 2022-11-02 DIAGNOSIS — D64.9 ANEMIA, UNSPECIFIED: ICD-10-CM

## 2022-11-02 PROCEDURE — 83550 IRON BINDING TEST: CPT

## 2022-11-02 PROCEDURE — 82607 VITAMIN B-12: CPT

## 2022-11-02 PROCEDURE — 82728 ASSAY OF FERRITIN: CPT

## 2022-11-02 PROCEDURE — 85025 COMPLETE CBC W/AUTO DIFF WBC: CPT

## 2022-11-02 PROCEDURE — 36415 COLL VENOUS BLD VENIPUNCTURE: CPT

## 2022-11-02 PROCEDURE — G0463: CPT

## 2022-11-02 PROCEDURE — 80069 RENAL FUNCTION PANEL: CPT

## 2022-11-02 PROCEDURE — 82550 ASSAY OF CK (CPK): CPT

## 2022-11-03 LAB
ALBUMIN SERPL ELPH-MCNC: 3.5 G/DL
ANION GAP SERPL CALC-SCNC: 10 MMOL/L
BASOPHILS # BLD AUTO: 0.03 K/UL
BASOPHILS NFR BLD AUTO: 0.5 %
BUN SERPL-MCNC: 39 MG/DL
CALCIUM SERPL-MCNC: 8.8 MG/DL
CHLORIDE SERPL-SCNC: 104 MMOL/L
CK SERPL-CCNC: 23 U/L
CO2 SERPL-SCNC: 23 MMOL/L
CREAT SERPL-MCNC: 2.95 MG/DL
EGFR: 19 ML/MIN/1.73M2
EOSINOPHIL # BLD AUTO: 0.39 K/UL
EOSINOPHIL NFR BLD AUTO: 6.9 %
FERRITIN SERPL-MCNC: 38 NG/ML
FOLATE SERPL-MCNC: 8.8 NG/ML
GLUCOSE SERPL-MCNC: 246 MG/DL
HCT VFR BLD CALC: 29.5 %
HGB BLD-MCNC: 9.8 G/DL
IMM GRANULOCYTES NFR BLD AUTO: 0.5 %
IRON SATN MFR SERPL: 18 %
IRON SERPL-MCNC: 43 UG/DL
LYMPHOCYTES # BLD AUTO: 1.98 K/UL
LYMPHOCYTES NFR BLD AUTO: 35.1 %
MAN DIFF?: NORMAL
MCHC RBC-ENTMCNC: 27.8 PG
MCHC RBC-ENTMCNC: 33.2 GM/DL
MCV RBC AUTO: 83.6 FL
MONOCYTES # BLD AUTO: 0.48 K/UL
MONOCYTES NFR BLD AUTO: 8.5 %
NEUTROPHILS # BLD AUTO: 2.73 K/UL
NEUTROPHILS NFR BLD AUTO: 48.5 %
PHOSPHATE SERPL-MCNC: 5.2 MG/DL
PLATELET # BLD AUTO: 300 K/UL
POTASSIUM SERPL-SCNC: 4.9 MMOL/L
RBC # BLD: 3.53 M/UL
RBC # FLD: 13.2 %
SODIUM SERPL-SCNC: 137 MMOL/L
TIBC SERPL-MCNC: 242 UG/DL
UIBC SERPL-MCNC: 199 UG/DL
VIT B12 SERPL-MCNC: 833 PG/ML
WBC # FLD AUTO: 5.64 K/UL

## 2022-11-03 NOTE — PHYSICAL EXAM
[Normal] : normal rate, regular rhythm, normal S1 and S2 and no murmur heard [No Edema] : there was no peripheral edema [de-identified] : Tenderness to palpation on L hip, anterior thigh, anterior/medial/lateral knee. No visible swelling. Full ROM.

## 2022-11-03 NOTE — REVIEW OF SYSTEMS
[Joint Stiffness] : joint stiffness [Joint Swelling] : joint swelling [Muscle Pain] : muscle pain [Negative] : Respiratory

## 2022-11-03 NOTE — HISTORY OF PRESENT ILLNESS
[FreeTextEntry8] : 51 y/o F with insulin-dependent DM, sarcoidosis, CKD with proteinuria, hypercalcemia here for acute visit.\par \par Had telephonic visit on 10/26 for 3-4 week h/o LLE pain, orst in the knee, radiating proximally and distally.\par Referred for xray of L hip and knee (not done)\par Increased gabapentin to 200 TID and added tramadal prn for severe pain x 1 week.\par \par Pt reports that has been having L hip pain and L knee pain x 4-5 weeks. \par Says that even when touches her leg, will have pain.\par Tingling on the bottom of foot, improved with gabapentin. Increased dose. \par Pain is continuous throughout the day. Currently taking tramadol, voltaren, icy hot, tylenol. \par Feels some stiffness as well. \par Able to walk but sometimes limping. \par \par DM - last A1c 9.9 (down from 12 previously). dorota 36u, timothy

## 2022-11-03 NOTE — ASSESSMENT
[FreeTextEntry1] : 53 y/o F insulin-dependent DM, sarcoidosis, CKD with proteinuria here for acute visit for LLE pain.\par Pain localized in L hip and L knee but also with some tenderness to palpation of quadriceps.\par No alarm symptoms.\par Suspect possible OA with referred pain. \par - Advised to proceed with hip and knee xrays\par - Can continue tramadol prn\par - Discussed careful use of voltaren, given CKD and possible systemic absorption.\par - will check labs including CK\par \par RTC in 1-2 months or sooner prn.

## 2022-11-08 ENCOUNTER — NON-APPOINTMENT (OUTPATIENT)
Age: 52
End: 2022-11-08

## 2022-11-08 DIAGNOSIS — M54.9 DORSALGIA, UNSPECIFIED: ICD-10-CM

## 2022-11-15 ENCOUNTER — APPOINTMENT (OUTPATIENT)
Dept: PULMONOLOGY | Facility: CLINIC | Age: 52
End: 2022-11-15

## 2022-11-15 ENCOUNTER — APPOINTMENT (OUTPATIENT)
Dept: RADIOLOGY | Facility: IMAGING CENTER | Age: 52
End: 2022-11-15

## 2022-11-15 ENCOUNTER — OUTPATIENT (OUTPATIENT)
Dept: OUTPATIENT SERVICES | Facility: HOSPITAL | Age: 52
LOS: 1 days | End: 2022-11-15
Payer: MEDICARE

## 2022-11-15 DIAGNOSIS — M54.50 LOW BACK PAIN, UNSPECIFIED: ICD-10-CM

## 2022-11-15 DIAGNOSIS — M25.562 PAIN IN LEFT KNEE: ICD-10-CM

## 2022-11-15 PROCEDURE — 73502 X-RAY EXAM HIP UNI 2-3 VIEWS: CPT | Mod: 26,LT

## 2022-11-15 PROCEDURE — 73502 X-RAY EXAM HIP UNI 2-3 VIEWS: CPT

## 2022-11-15 PROCEDURE — 73562 X-RAY EXAM OF KNEE 3: CPT

## 2022-11-15 PROCEDURE — 73562 X-RAY EXAM OF KNEE 3: CPT | Mod: 26,LT

## 2022-11-18 ENCOUNTER — NON-APPOINTMENT (OUTPATIENT)
Age: 52
End: 2022-11-18

## 2023-01-11 ENCOUNTER — APPOINTMENT (OUTPATIENT)
Dept: NEPHROLOGY | Facility: CLINIC | Age: 53
End: 2023-01-11
Payer: MEDICARE

## 2023-01-11 ENCOUNTER — OUTPATIENT (OUTPATIENT)
Dept: OUTPATIENT SERVICES | Facility: HOSPITAL | Age: 53
LOS: 1 days | End: 2023-01-11

## 2023-01-11 VITALS
BODY MASS INDEX: 21.34 KG/M2 | DIASTOLIC BLOOD PRESSURE: 98 MMHG | HEART RATE: 75 BPM | SYSTOLIC BLOOD PRESSURE: 140 MMHG | WEIGHT: 113 LBS | HEIGHT: 61 IN | OXYGEN SATURATION: 99 %

## 2023-01-11 DIAGNOSIS — N18.4 CHRONIC KIDNEY DISEASE, STAGE 4 (SEVERE): ICD-10-CM

## 2023-01-11 PROCEDURE — 99214 OFFICE O/P EST MOD 30 MIN: CPT

## 2023-01-12 ENCOUNTER — NON-APPOINTMENT (OUTPATIENT)
Age: 53
End: 2023-01-12

## 2023-01-12 RX ORDER — TORSEMIDE 10 MG/1
10 TABLET ORAL DAILY
Qty: 90 | Refills: 0 | Status: DISCONTINUED | COMMUNITY
Start: 2022-08-03 | End: 2023-01-12

## 2023-01-12 NOTE — ASSESSMENT
[FreeTextEntry1] : 1. CKD Stage 4/Proteinuria: Pt. with previous history of MIKALA in the setting of hypercalcemia. Pt was initially seen during her hospitalization at [facility] from 9/25/17 to 10/3/17. At that time, the patient was admitted with Scr of 5.65 and serum calcium of 15.3 on 09/25/17. Scr improved to 3.48 and serum calcium decreased to 10.7 on 10/03/17. Scr improved 1.44 and serum calcium is WNL at 10.1 on labs done on 9/24/18. Scr remained stable at 1.41 on 8/9/2021. Scr increased to 3.36 on 9/12/22. Last Scr is el;evated /improved to 2.95 on 11/1/22. Pt. with significant/nephrotic range proteinuria in setting of poorly controlled DM (HbA1C was significantly elevated at 14.2 in July 2022). Pt. with most likely diabetic nephropathy. Check renal panel and spot urine TP/Cr ratio today. Continue Losartan for antiproteinuric effect. Importance of better glycemic and BP control discussed with the patient today. Monitor kidney function. Avoid NSAIDs, RCA and nephrotoxins\par \par 2. HTN: BP is above goal during clinic visit today. However, pt reports that BP readings at home have been 120s-130s systolic. Continue with current BP meds. Low salt diet advised. Monitor BP daily at home. \par \par 3. LE edema: LE edema resolved with torsemide use. Continue with Torsemide 10 mg qd. Low salt diet and fluid restriction advised. Monitor body weight.  \par \par RTC: 3 months

## 2023-01-12 NOTE — END OF VISIT
[] : Fellow [FreeTextEntry3] : Patient examined and ROS reviewed. Agreed with the findings and management. [Time Spent: ___ minutes] : I have spent [unfilled] minutes of time on the encounter. [>50% of the face to face encounter time was spent on counseling and/or coordination of care for ___] : Greater than 50% of the face to face encounter time was spent on counseling and/or coordination of care for [unfilled]

## 2023-01-12 NOTE — REVIEW OF SYSTEMS
[As Noted in HPI] : as noted in HPI [Negative] : Heme/Lymph [Fever] : no fever [Eye Pain] : no eye pain [Earache] : no earache [Heart Rate Is Slow] : the heart rate was not slow [Heart Rate Is Fast] : the heart rate was not fast [Chest Pain] : no chest pain [Palpitations] : no palpitations [Lower Ext Edema] : no lower extremity edema [Shortness Of Breath] : no shortness of breath [Cough] : no cough [SOB on Exertion] : no shortness of breath during exertion [Abdominal Pain] : no abdominal pain [Dysuria] : no dysuria [Incontinence] : no incontinence [Arthralgias] : no arthralgias [Limb Swelling] : no limb swelling [Skin Lesions] : no skin lesions [Skin Wound] : no skin wound [Confused] : no confusion [Convulsions] : no convulsions [Dizziness] : no dizziness [Depression] : no depression [Muscle Weakness] : no muscle weakness

## 2023-01-12 NOTE — HISTORY OF PRESENT ILLNESS
[FreeTextEntry1] : 52-year-old female with longstanding history of insulin-dependent DM, sarcoidosis (biopsy proven in 2010), CKD, proteinuria, seizure disorder and hypercalcemia presented for follow-up visit. Pt. was seen in clinic last week on 8/3/22. During last clinic visit, pt was started on torsemide 10 mg qd for BL LE edema. \par \par Pt. currently feels well, repports that LE edema has resolved with torsemide. No fever, CP, SOB, HA or dizziness during clinic visit today. Pt. admits compliance to her BP mediations. SBP ranges in 120-130s at home (as per pt.).

## 2023-01-13 DIAGNOSIS — R60.0 LOCALIZED EDEMA: ICD-10-CM

## 2023-01-13 DIAGNOSIS — I10 ESSENTIAL (PRIMARY) HYPERTENSION: ICD-10-CM

## 2023-01-13 DIAGNOSIS — N18.4 CHRONIC KIDNEY DISEASE, STAGE 4 (SEVERE): ICD-10-CM

## 2023-01-13 DIAGNOSIS — R80.9 PROTEINURIA, UNSPECIFIED: ICD-10-CM

## 2023-01-20 ENCOUNTER — RX RENEWAL (OUTPATIENT)
Age: 53
End: 2023-01-20

## 2023-01-20 ENCOUNTER — NON-APPOINTMENT (OUTPATIENT)
Age: 53
End: 2023-01-20

## 2023-01-23 ENCOUNTER — RX RENEWAL (OUTPATIENT)
Age: 53
End: 2023-01-23

## 2023-01-23 ENCOUNTER — OUTPATIENT (OUTPATIENT)
Dept: OUTPATIENT SERVICES | Facility: HOSPITAL | Age: 53
LOS: 1 days | End: 2023-01-23

## 2023-01-23 ENCOUNTER — APPOINTMENT (OUTPATIENT)
Dept: INTERNAL MEDICINE | Facility: CLINIC | Age: 53
End: 2023-01-23
Payer: MEDICARE

## 2023-01-23 DIAGNOSIS — M13.0 POLYARTHRITIS, UNSPECIFIED: ICD-10-CM

## 2023-01-23 DIAGNOSIS — I10 ESSENTIAL (PRIMARY) HYPERTENSION: ICD-10-CM

## 2023-01-23 PROCEDURE — 99212 OFFICE O/P EST SF 10 MIN: CPT | Mod: GE,95

## 2023-01-24 PROBLEM — M13.0 SYMMETRICAL POLYARTHRITIS: Status: ACTIVE | Noted: 2023-01-24

## 2023-01-25 ENCOUNTER — INPATIENT (INPATIENT)
Facility: HOSPITAL | Age: 53
LOS: 5 days | Discharge: HOME HEALTH | DRG: 73 | End: 2023-01-31
Attending: HOSPITALIST | Admitting: STUDENT IN AN ORGANIZED HEALTH CARE EDUCATION/TRAINING PROGRAM
Payer: MEDICARE

## 2023-01-25 VITALS
RESPIRATION RATE: 20 BRPM | TEMPERATURE: 98 F | DIASTOLIC BLOOD PRESSURE: 132 MMHG | HEART RATE: 107 BPM | OXYGEN SATURATION: 100 % | WEIGHT: 130.07 LBS | SYSTOLIC BLOOD PRESSURE: 203 MMHG

## 2023-01-25 LAB
ALBUMIN SERPL ELPH-MCNC: 3.6 G/DL — SIGNIFICANT CHANGE UP (ref 3.3–5)
ALP SERPL-CCNC: 48 U/L — SIGNIFICANT CHANGE UP (ref 40–120)
ALT FLD-CCNC: 9 U/L — LOW (ref 10–45)
ANION GAP SERPL CALC-SCNC: 15 MMOL/L — SIGNIFICANT CHANGE UP (ref 5–17)
AST SERPL-CCNC: 13 U/L — SIGNIFICANT CHANGE UP (ref 10–40)
BASE EXCESS BLDV CALC-SCNC: -4.9 MMOL/L — LOW (ref -2–3)
BASOPHILS # BLD AUTO: 0.03 K/UL — SIGNIFICANT CHANGE UP (ref 0–0.2)
BASOPHILS NFR BLD AUTO: 0.4 % — SIGNIFICANT CHANGE UP (ref 0–2)
BILIRUB SERPL-MCNC: 0.2 MG/DL — SIGNIFICANT CHANGE UP (ref 0.2–1.2)
BUN SERPL-MCNC: 55 MG/DL — HIGH (ref 7–23)
CA-I SERPL-SCNC: 1.24 MMOL/L — SIGNIFICANT CHANGE UP (ref 1.15–1.33)
CALCIUM SERPL-MCNC: 9.5 MG/DL — SIGNIFICANT CHANGE UP (ref 8.4–10.5)
CHLORIDE BLDV-SCNC: 105 MMOL/L — SIGNIFICANT CHANGE UP (ref 96–108)
CHLORIDE SERPL-SCNC: 102 MMOL/L — SIGNIFICANT CHANGE UP (ref 96–108)
CO2 BLDV-SCNC: 23 MMOL/L — SIGNIFICANT CHANGE UP (ref 22–26)
CO2 SERPL-SCNC: 18 MMOL/L — LOW (ref 22–31)
CREAT SERPL-MCNC: 4.9 MG/DL — HIGH (ref 0.5–1.3)
EGFR: 10 ML/MIN/1.73M2 — LOW
EOSINOPHIL # BLD AUTO: 0.34 K/UL — SIGNIFICANT CHANGE UP (ref 0–0.5)
EOSINOPHIL NFR BLD AUTO: 4.3 % — SIGNIFICANT CHANGE UP (ref 0–6)
FLUAV AG NPH QL: SIGNIFICANT CHANGE UP
FLUBV AG NPH QL: SIGNIFICANT CHANGE UP
GAS PNL BLDV: 135 MMOL/L — LOW (ref 136–145)
GAS PNL BLDV: SIGNIFICANT CHANGE UP
GLUCOSE BLDV-MCNC: 364 MG/DL — HIGH (ref 70–99)
GLUCOSE SERPL-MCNC: 360 MG/DL — HIGH (ref 70–99)
HCO3 BLDV-SCNC: 22 MMOL/L — SIGNIFICANT CHANGE UP (ref 22–29)
HCT VFR BLD CALC: 31 % — LOW (ref 34.5–45)
HCT VFR BLDA CALC: 33 % — LOW (ref 34.5–46.5)
HGB BLD CALC-MCNC: 10.9 G/DL — LOW (ref 11.7–16.1)
HGB BLD-MCNC: 10.2 G/DL — LOW (ref 11.5–15.5)
IMM GRANULOCYTES NFR BLD AUTO: 0.3 % — SIGNIFICANT CHANGE UP (ref 0–0.9)
LACTATE BLDV-MCNC: 1.2 MMOL/L — SIGNIFICANT CHANGE UP (ref 0.5–2)
LIDOCAIN IGE QN: 71 U/L — HIGH (ref 7–60)
LYMPHOCYTES # BLD AUTO: 1.81 K/UL — SIGNIFICANT CHANGE UP (ref 1–3.3)
LYMPHOCYTES # BLD AUTO: 22.9 % — SIGNIFICANT CHANGE UP (ref 13–44)
MCHC RBC-ENTMCNC: 27 PG — SIGNIFICANT CHANGE UP (ref 27–34)
MCHC RBC-ENTMCNC: 32.9 GM/DL — SIGNIFICANT CHANGE UP (ref 32–36)
MCV RBC AUTO: 82 FL — SIGNIFICANT CHANGE UP (ref 80–100)
MONOCYTES # BLD AUTO: 0.3 K/UL — SIGNIFICANT CHANGE UP (ref 0–0.9)
MONOCYTES NFR BLD AUTO: 3.8 % — SIGNIFICANT CHANGE UP (ref 2–14)
NEUTROPHILS # BLD AUTO: 5.39 K/UL — SIGNIFICANT CHANGE UP (ref 1.8–7.4)
NEUTROPHILS NFR BLD AUTO: 68.3 % — SIGNIFICANT CHANGE UP (ref 43–77)
NRBC # BLD: 0 /100 WBCS — SIGNIFICANT CHANGE UP (ref 0–0)
PCO2 BLDV: 45 MMHG — HIGH (ref 39–42)
PH BLDV: 7.29 — LOW (ref 7.32–7.43)
PLATELET # BLD AUTO: 331 K/UL — SIGNIFICANT CHANGE UP (ref 150–400)
PO2 BLDV: 26 MMHG — SIGNIFICANT CHANGE UP (ref 25–45)
POTASSIUM BLDV-SCNC: 5.2 MMOL/L — HIGH (ref 3.5–5.1)
POTASSIUM SERPL-MCNC: 5.3 MMOL/L — SIGNIFICANT CHANGE UP (ref 3.5–5.3)
POTASSIUM SERPL-SCNC: 5.3 MMOL/L — SIGNIFICANT CHANGE UP (ref 3.5–5.3)
PROT SERPL-MCNC: 7.2 G/DL — SIGNIFICANT CHANGE UP (ref 6–8.3)
RBC # BLD: 3.78 M/UL — LOW (ref 3.8–5.2)
RBC # FLD: 12.4 % — SIGNIFICANT CHANGE UP (ref 10.3–14.5)
RSV RNA NPH QL NAA+NON-PROBE: SIGNIFICANT CHANGE UP
SAO2 % BLDV: 43.4 % — LOW (ref 67–88)
SARS-COV-2 RNA SPEC QL NAA+PROBE: SIGNIFICANT CHANGE UP
SODIUM SERPL-SCNC: 135 MMOL/L — SIGNIFICANT CHANGE UP (ref 135–145)
WBC # BLD: 7.89 K/UL — SIGNIFICANT CHANGE UP (ref 3.8–10.5)
WBC # FLD AUTO: 7.89 K/UL — SIGNIFICANT CHANGE UP (ref 3.8–10.5)

## 2023-01-25 PROCEDURE — 73562 X-RAY EXAM OF KNEE 3: CPT | Mod: 26,RT

## 2023-01-25 PROCEDURE — 71045 X-RAY EXAM CHEST 1 VIEW: CPT | Mod: 26

## 2023-01-25 PROCEDURE — 73590 X-RAY EXAM OF LOWER LEG: CPT | Mod: 26,RT

## 2023-01-25 PROCEDURE — 99285 EMERGENCY DEPT VISIT HI MDM: CPT | Mod: GC

## 2023-01-25 RX ORDER — MORPHINE SULFATE 50 MG/1
4 CAPSULE, EXTENDED RELEASE ORAL ONCE
Refills: 0 | Status: DISCONTINUED | OUTPATIENT
Start: 2023-01-25 | End: 2023-01-25

## 2023-01-25 RX ORDER — SODIUM CHLORIDE 9 MG/ML
1000 INJECTION INTRAMUSCULAR; INTRAVENOUS; SUBCUTANEOUS ONCE
Refills: 0 | Status: COMPLETED | OUTPATIENT
Start: 2023-01-25 | End: 2023-01-25

## 2023-01-25 RX ADMIN — Medication 0.5 MILLIGRAM(S): at 23:08

## 2023-01-25 RX ADMIN — Medication 650 MILLIGRAM(S): at 00:00

## 2023-01-25 RX ADMIN — SODIUM CHLORIDE 1000 MILLILITER(S): 9 INJECTION INTRAMUSCULAR; INTRAVENOUS; SUBCUTANEOUS at 22:07

## 2023-01-25 RX ADMIN — MORPHINE SULFATE 4 MILLIGRAM(S): 50 CAPSULE, EXTENDED RELEASE ORAL at 22:04

## 2023-01-25 NOTE — ED ADULT NURSE NOTE - NSICDXPASTMEDICALHX_GEN_ALL_CORE_FT
PAST MEDICAL HISTORY:  Chronic kidney disease (CKD), stage III (moderate)     Diabetes     Hyperlipidemia     Hypertension     Sarcoidosis     Seizure disorder

## 2023-01-25 NOTE — ED PROVIDER NOTE - CLINICAL SUMMARY MEDICAL DECISION MAKING FREE TEXT BOX
52-year-old past medical history sarcoidosis who presents to the ED for worsening knee and hip pain that has been ongoing for the past couple of months.  Recently prescribed tramadol for pain management which has exacerbated her poor appetite, nausea, vomiting.  Of note, now difficulty ambulating secondary to pain mostly bedbound.  On exam right knee tender to palpation no edema or erythema.  Left hip tender to palpation but no edema or erythema.  No concerns of a septic joint at this time.  Will assess with x-rays and blood work. 52-year-old past medical history sarcoidosis who presents to the ED for worsening knee and hip pain that has been ongoing for the past couple of months.  Recently prescribed tramadol for pain management which has exacerbated her poor appetite, nausea, vomiting.  Of note, now difficulty ambulating secondary to pain mostly bedbound.  On exam right knee tender to palpation no edema or erythema.  Left hip tender to palpation but no edema or erythema.  No concerns of a septic joint at this time.  Will assess with x-rays and blood work.    Hallie LOPEZ: See my attestation for history and physical. multiple MSK complaints - concerning for arthritis or possibly other autoimmune process. Exam is not significant for swelling, skin changes. Patient notably has MIKALA with Cr of 4.9. She reports she is not eating or drinking. Patient is on torsemide. Will need to hydrate gently. She will require admission for further treatment and evaluation of MIKALA and MSK pain.

## 2023-01-25 NOTE — ED PROVIDER NOTE - OBJECTIVE STATEMENT
52-year-old female patient past medical history of sarcoidosis, CKD, hyperlipidemia, GERD, diabetes who presents to the emergency department by referral of the PCP for further knee and hip pain that has been persistent for the past 2 months and worsening.  Patient endorsing weight loss and loss of appetite for the past couple of months with intermittent vomiting.  Of note, has been experiencing episodic headaches and dizziness.  Was recently prescribed tramadol which patient endorses has worsened her nausea.  Patient baseline short of breath, but denies any worsening shortness of breath or any chest pain, diarrhea, abdominal pain or any other complaints. Patient endorsing difficulty ambulating secondary to pain and being nearly bedbound. Currently on 10 of prednisone.

## 2023-01-25 NOTE — ED PROVIDER NOTE - ATTENDING CONTRIBUTION TO CARE
Patient is a 52-year-old female with a history of sarcoidosis,  diabetes, hypertension, hyperlipidemia, CKD stage III, seizure disorder presenting with multiple complaints.  Patient complains of bilateral knee pain and left hip pain.  She reported that she had headaches and was overall weak in triage.  Patient reports that she has not been eating or drinking.  She states that she has lost about 13 pounds in the past month.  She states that she takes tramadol for her pain which causes her to be nauseated.  She denies any fevers, chest pain,  abdominal pain, urinary symptoms.  She denies any black or bloody stools.    VS noted  Gen. no acute distress, Non toxic, underweight  HEENT: EOMI, mmm, pale conjunctiva  Lungs: CTAB/L no C/ W /R   CVS: RRR   Abd; Soft non tender, non distended  Pelvis: stable, ttp to left hip, full ROM, no skin changes  Ext: no edema, bilateral knees with full ROM, not warm, no laxity, mild tenderness to bilateral knees, no skin changes, feet without any lesions  Skin: no rash  Neuro AAOx3 non focal clear speech  a/p: multiple MSK complaints - concerning for arthritis or possibly other autoimmune process. Exam is not significant for swelling, skin changes. Patient notably has MIKALA with Cr of 4.9. She reports she is not eating or drinking. Patient is on torsemide. Will need to hydrate gently. She will require admission for further treatment and evaluation of MIKALA and MSK pain.   - Hallie LOPEZ

## 2023-01-25 NOTE — ED PROVIDER NOTE - PROGRESS NOTE DETAILS
X-rays nonactionable/unremarkable at this time.  PEnding CT head.  Will admit patient given unable to ambulate secondary to pain, weight loss/failure to thrive, poor appetite, dehydration.

## 2023-01-25 NOTE — ED CLERICAL - NS ED CLERK NOTE PRE-ARRIVAL INFORMATION; ADDITIONAL PRE-ARRIVAL INFORMATION
CC/Reason For referral: Pt. was having tele visit and reported being bedbound. Hx sarcoidosis, CKD. Needs expedited work-up for sarcoid arthritis, gout, CPPD and rheumatology evaluation.   Preferred Consultant(if applicable):  Who admits for you (if needed):  Do you have documents you would like to fax over?  Would you still like to speak to an ED attending? Yes

## 2023-01-25 NOTE — ED ADULT NURSE NOTE - NSIMPLEMENTINTERV_GEN_ALL_ED
Implemented All Fall Risk Interventions:  [place] to call system. Call bell, personal items and telephone within reach. Instruct patient to call for assistance. Room bathroom lighting operational. Non-slip footwear when patient is off stretcher. Physically safe environment: no spills, clutter or unnecessary equipment. Stretcher in lowest position, wheels locked, appropriate side rails in place. Provide visual cue, wrist band, yellow gown, etc. Monitor gait and stability. Monitor for mental status changes and reorient to person, place, and time. Review medications for side effects contributing to fall risk. Reinforce activity limits and safety measures with patient and family.

## 2023-01-25 NOTE — ED PROVIDER NOTE - PHYSICAL EXAMINATION
GENERAL: Awake, alert, NAD  HEENT: NC/AT, dry mucous membranes, EOMI  LUNGS: CTAB, no wheezes or crackles   CARDIAC: RRR, no m/r/g  ABDOMEN: Soft, non tender, non distended, no rebound, no guarding  BACK: No midline spinal tenderness  EXT: Right knee not edematous or erythematous.  Right knee tender to palpation over lower lateral aspect of patella.  Left hip tender to palpation.  Range of motion intact but limited to pain  NEURO: A&Ox3. Moving all extremities.  SKIN: Warm and dry. No rash..  PSYCH: Normal affect.

## 2023-01-25 NOTE — ED ADULT NURSE NOTE - OBJECTIVE STATEMENT
52 y.o. F A&Ox4 PMHx of sarcoidosis, CKD, hyperlipidemia, GERD, diabetes presenting to ED via walk-in triage from home for R Knee/hip pain. States that she has been having R knee and hip pain for 2 months and that it has been worsening. States that she went to her PCP and was referred to the ED for further work-up. States that she has also been having H/A and dizziness with Nausea that is preventing PO intake (including BP medication). States that she has lost a significant amount of weight over the last few months. Pt denies fever/chills, vision changes, SOB, chest pain, abd pain, Diarrhea, constipation, dysuria, hematuria, hematochezia, numbness, and tingling. Upon assessment, pt alert, grossly neurologically intact, and well appearing. Pupils PERRLA and no drainage noted. Airway patent, chest rise symmetrical with no advantageous L/S, and pt is eupneic. Skin is warm, dry, and normal for race. No cyanosis noted to lips or fingernails. Mucous membranes dry. Negative clubbed fingernails. Radial pulses equal and +2 bilaterally. Abd soft, nontender, nondistended. ROM intact and strength +5 in all four extremities. No edema noted to bilateral legs or arms. R hip and knee tender to palpation. Pt resting in stretcher in position of comfort. Stretcher locked and lowered and call bell within reach.

## 2023-01-25 NOTE — ED PROVIDER NOTE - RAPID ASSESSMENT
PMD: Dr. Venkata Roman MD ([phone])  51 y/o F PMHx seizure disorder, CKD3, HLD, GERD, DM, presents to the ED c/o rt knee and leg pain accompanied with headaches and weakness. Pt was sent by PMD to the ED for further eval. Pt reports loss of appetite, weight loss of 13lbs in a month, some vomiting. Pt reports headache and missing blood pressure medication for the past two days due to nausea. Pt denies asthma, chance of pregnancy, fever, trouble ambulating. Pt awake and alert with leg pain and high blood pressure.    Chato MARTINEZ (Scribe) have documented this rapid assessment note under the dictation of Ronnie Nolan) which has been reviewed and affirmed to be accurate. Patient was seen as a QDOC patient. The patient will be seen and further worked up in the main emergency department and their care will be completed by the main emergency department team along with a thorough physical exam. Receiving team will follow up on labs, analgesia, any clinical imaging, reassess and disposition as clinically indicated, all decisions regarding the progression of care will be made at their discretion. PMD: Dr. Venkata Roman MD ([phone])  51 y/o F PMHx seizure disorder, CKD3, HLD, GERD, DM, presents to the ED c/o rt knee and leg pain accompanied with headaches and weakness. Pt was sent by PMD to the ED for further eval. Pt reports loss of appetite, weight loss of 13lbs in a month, some vomiting. Pt reports headache and missing blood pressure medication for the past two days due to nausea. Pt denies asthma, chance of pregnancy, fever, trouble ambulating. Pt awake and alert with leg pain and high blood pressure.  Ronnie Nolan MD, Cristina   I, Chato James (Scribe) have documented this rapid assessment note under the dictation of Ronnie Nolan (MD) which has been reviewed and affirmed to be accurate. Patient was seen as a QDOC patient. The patient will be seen and further worked up in the main emergency department and their care will be completed by the main emergency department team along with a thorough physical exam. Receiving team will follow up on labs, analgesia, any clinical imaging, reassess and disposition as clinically indicated, all decisions regarding the progression of care will be made at their discretion.    PT seen as Q-doc/Triage with scribe, full evaluation to be performed once pt is transferred to Main ED  Ronnie Nolan MD, Cristina

## 2023-01-25 NOTE — ED PROVIDER NOTE - NS ED ROS FT
CONST: no fevers, no chills. + Dizziness, weakness  EYES: no pain, no vision changes  ENT: no sore throat, no ear pain, no change in hearing  CV: no chest pain, no leg swelling  RESP: no shortness of breath, no cough  ABD: no abdominal pain, no diarrhea. + Nausea, vomiting  : no dysuria, no flank pain, no hematuria  MSK: no back pain. + Knee and hip pain  NEURO: + Headache  SKIN:  no rash

## 2023-01-26 ENCOUNTER — NON-APPOINTMENT (OUTPATIENT)
Age: 53
End: 2023-01-26

## 2023-01-26 DIAGNOSIS — I10 ESSENTIAL (PRIMARY) HYPERTENSION: ICD-10-CM

## 2023-01-26 DIAGNOSIS — D86.9 SARCOIDOSIS, UNSPECIFIED: ICD-10-CM

## 2023-01-26 DIAGNOSIS — R62.7 ADULT FAILURE TO THRIVE: ICD-10-CM

## 2023-01-26 DIAGNOSIS — E11.9 TYPE 2 DIABETES MELLITUS WITHOUT COMPLICATIONS: ICD-10-CM

## 2023-01-26 DIAGNOSIS — Z29.9 ENCOUNTER FOR PROPHYLACTIC MEASURES, UNSPECIFIED: ICD-10-CM

## 2023-01-26 DIAGNOSIS — M35.3 POLYMYALGIA RHEUMATICA: ICD-10-CM

## 2023-01-26 DIAGNOSIS — N17.9 ACUTE KIDNEY FAILURE, UNSPECIFIED: ICD-10-CM

## 2023-01-26 DIAGNOSIS — R11.2 NAUSEA WITH VOMITING, UNSPECIFIED: ICD-10-CM

## 2023-01-26 DIAGNOSIS — N18.9 CHRONIC KIDNEY DISEASE, UNSPECIFIED: ICD-10-CM

## 2023-01-26 LAB
APPEARANCE UR: CLEAR — SIGNIFICANT CHANGE UP
BACTERIA # UR AUTO: NEGATIVE — SIGNIFICANT CHANGE UP
BILIRUB UR-MCNC: NEGATIVE — SIGNIFICANT CHANGE UP
CK SERPL-CCNC: 26 U/L — SIGNIFICANT CHANGE UP (ref 25–170)
COLOR SPEC: SIGNIFICANT CHANGE UP
CREAT ?TM UR-MCNC: 28 MG/DL — SIGNIFICANT CHANGE UP
CREAT ?TM UR-MCNC: 32 MG/DL — SIGNIFICANT CHANGE UP
DIFF PNL FLD: ABNORMAL
EPI CELLS # UR: 2 /HPF — SIGNIFICANT CHANGE UP
GLUCOSE BLDC GLUCOMTR-MCNC: 140 MG/DL — HIGH (ref 70–99)
GLUCOSE BLDC GLUCOMTR-MCNC: 211 MG/DL — HIGH (ref 70–99)
GLUCOSE BLDC GLUCOMTR-MCNC: 359 MG/DL — HIGH (ref 70–99)
GLUCOSE BLDC GLUCOMTR-MCNC: 429 MG/DL — HIGH (ref 70–99)
GLUCOSE UR QL: ABNORMAL
HYALINE CASTS # UR AUTO: 3 /LPF — HIGH (ref 0–2)
KETONES UR-MCNC: NEGATIVE — SIGNIFICANT CHANGE UP
LEUKOCYTE ESTERASE UR-ACNC: NEGATIVE — SIGNIFICANT CHANGE UP
NITRITE UR-MCNC: NEGATIVE — SIGNIFICANT CHANGE UP
OSMOLALITY UR: 369 MOS/KG — SIGNIFICANT CHANGE UP (ref 300–900)
PH UR: 6.5 — SIGNIFICANT CHANGE UP (ref 5–8)
POTASSIUM UR-SCNC: 18 MMOL/L — SIGNIFICANT CHANGE UP
PROT ?TM UR-MCNC: 533 MG/DL — HIGH (ref 0–12)
PROT ?TM UR-MCNC: 724 MG/DL — HIGH (ref 0–12)
PROT UR-MCNC: >600
PROT/CREAT UR-RTO: 19 RATIO — HIGH (ref 0–0.2)
PROT/CREAT UR-RTO: 22.6 RATIO — HIGH (ref 0–0.2)
RBC CASTS # UR COMP ASSIST: 11 /HPF — HIGH (ref 0–4)
SODIUM UR-SCNC: 103 MMOL/L — SIGNIFICANT CHANGE UP
SODIUM UR-SCNC: 88 MMOL/L — SIGNIFICANT CHANGE UP
SP GR SPEC: 1.02 — SIGNIFICANT CHANGE UP (ref 1.01–1.02)
UROBILINOGEN FLD QL: NEGATIVE — SIGNIFICANT CHANGE UP
UUN UR-MCNC: 209 MG/DL — SIGNIFICANT CHANGE UP
UUN UR-MCNC: 249 MG/DL — SIGNIFICANT CHANGE UP
WBC UR QL: 2 /HPF — SIGNIFICANT CHANGE UP (ref 0–5)

## 2023-01-26 PROCEDURE — 72125 CT NECK SPINE W/O DYE: CPT | Mod: 26,MA

## 2023-01-26 PROCEDURE — 76770 US EXAM ABDO BACK WALL COMP: CPT | Mod: 26

## 2023-01-26 PROCEDURE — 12345: CPT | Mod: NC,GC

## 2023-01-26 PROCEDURE — 70450 CT HEAD/BRAIN W/O DYE: CPT | Mod: 26,MA

## 2023-01-26 RX ORDER — HYDRALAZINE HCL 50 MG
20 TABLET ORAL THREE TIMES A DAY
Refills: 0 | Status: DISCONTINUED | OUTPATIENT
Start: 2023-01-26 | End: 2023-01-26

## 2023-01-26 RX ORDER — DEXTROSE 50 % IN WATER 50 %
25 SYRINGE (ML) INTRAVENOUS ONCE
Refills: 0 | Status: DISCONTINUED | OUTPATIENT
Start: 2023-01-26 | End: 2023-01-26

## 2023-01-26 RX ORDER — GLUCAGON INJECTION, SOLUTION 0.5 MG/.1ML
1 INJECTION, SOLUTION SUBCUTANEOUS ONCE
Refills: 0 | Status: DISCONTINUED | OUTPATIENT
Start: 2023-01-26 | End: 2023-01-26

## 2023-01-26 RX ORDER — DEXTROSE 50 % IN WATER 50 %
25 SYRINGE (ML) INTRAVENOUS ONCE
Refills: 0 | Status: DISCONTINUED | OUTPATIENT
Start: 2023-01-26 | End: 2023-01-31

## 2023-01-26 RX ORDER — INSULIN LISPRO 100/ML
4 VIAL (ML) SUBCUTANEOUS
Refills: 0 | Status: DISCONTINUED | OUTPATIENT
Start: 2023-01-26 | End: 2023-01-26

## 2023-01-26 RX ORDER — ALBUTEROL 90 UG/1
2 AEROSOL, METERED ORAL EVERY 6 HOURS
Refills: 0 | Status: DISCONTINUED | OUTPATIENT
Start: 2023-01-26 | End: 2023-01-31

## 2023-01-26 RX ORDER — INSULIN LISPRO 100/ML
9 VIAL (ML) SUBCUTANEOUS
Refills: 0 | Status: DISCONTINUED | OUTPATIENT
Start: 2023-01-26 | End: 2023-01-28

## 2023-01-26 RX ORDER — PANTOPRAZOLE SODIUM 20 MG/1
40 TABLET, DELAYED RELEASE ORAL
Refills: 0 | Status: DISCONTINUED | OUTPATIENT
Start: 2023-01-26 | End: 2023-01-31

## 2023-01-26 RX ORDER — NYSTATIN 500MM UNIT
4 POWDER (EA) MISCELLANEOUS
Qty: 0 | Refills: 0 | DISCHARGE

## 2023-01-26 RX ORDER — SODIUM CHLORIDE 9 MG/ML
1000 INJECTION INTRAMUSCULAR; INTRAVENOUS; SUBCUTANEOUS ONCE
Refills: 0 | Status: COMPLETED | OUTPATIENT
Start: 2023-01-26 | End: 2023-01-26

## 2023-01-26 RX ORDER — INSULIN LISPRO 100/ML
5 VIAL (ML) SUBCUTANEOUS
Refills: 0 | Status: DISCONTINUED | OUTPATIENT
Start: 2023-01-26 | End: 2023-01-26

## 2023-01-26 RX ORDER — DEXTROSE 50 % IN WATER 50 %
15 SYRINGE (ML) INTRAVENOUS ONCE
Refills: 0 | Status: DISCONTINUED | OUTPATIENT
Start: 2023-01-26 | End: 2023-01-26

## 2023-01-26 RX ORDER — OXYCODONE HYDROCHLORIDE 5 MG/1
5 TABLET ORAL ONCE
Refills: 0 | Status: DISCONTINUED | OUTPATIENT
Start: 2023-01-26 | End: 2023-01-26

## 2023-01-26 RX ORDER — INSULIN LISPRO 100/ML
VIAL (ML) SUBCUTANEOUS AT BEDTIME
Refills: 0 | Status: DISCONTINUED | OUTPATIENT
Start: 2023-01-26 | End: 2023-01-28

## 2023-01-26 RX ORDER — LACOSAMIDE 50 MG/1
1 TABLET ORAL
Qty: 0 | Refills: 0 | DISCHARGE

## 2023-01-26 RX ORDER — SODIUM CHLORIDE 9 MG/ML
1000 INJECTION, SOLUTION INTRAVENOUS
Refills: 0 | Status: DISCONTINUED | OUTPATIENT
Start: 2023-01-26 | End: 2023-01-26

## 2023-01-26 RX ORDER — HYDRALAZINE HCL 50 MG
50 TABLET ORAL THREE TIMES A DAY
Refills: 0 | Status: DISCONTINUED | OUTPATIENT
Start: 2023-01-26 | End: 2023-01-27

## 2023-01-26 RX ORDER — AMLODIPINE BESYLATE 2.5 MG/1
5 TABLET ORAL ONCE
Refills: 0 | Status: COMPLETED | OUTPATIENT
Start: 2023-01-26 | End: 2023-01-26

## 2023-01-26 RX ORDER — FLUCONAZOLE 150 MG/1
1 TABLET ORAL
Qty: 0 | Refills: 0 | DISCHARGE

## 2023-01-26 RX ORDER — LOSARTAN POTASSIUM 100 MG/1
100 TABLET, FILM COATED ORAL ONCE
Refills: 0 | Status: COMPLETED | OUTPATIENT
Start: 2023-01-26 | End: 2023-01-26

## 2023-01-26 RX ORDER — SODIUM CHLORIDE 9 MG/ML
1000 INJECTION INTRAMUSCULAR; INTRAVENOUS; SUBCUTANEOUS
Refills: 0 | Status: DISCONTINUED | OUTPATIENT
Start: 2023-01-26 | End: 2023-01-27

## 2023-01-26 RX ORDER — ONDANSETRON 8 MG/1
4 TABLET, FILM COATED ORAL EVERY 8 HOURS
Refills: 0 | Status: DISCONTINUED | OUTPATIENT
Start: 2023-01-26 | End: 2023-01-31

## 2023-01-26 RX ORDER — PANTOPRAZOLE SODIUM 20 MG/1
1 TABLET, DELAYED RELEASE ORAL
Qty: 0 | Refills: 0 | DISCHARGE

## 2023-01-26 RX ORDER — CHOLECALCIFEROL (VITAMIN D3) 125 MCG
1 CAPSULE ORAL
Qty: 0 | Refills: 0 | DISCHARGE

## 2023-01-26 RX ORDER — DEXTROSE 50 % IN WATER 50 %
15 SYRINGE (ML) INTRAVENOUS ONCE
Refills: 0 | Status: DISCONTINUED | OUTPATIENT
Start: 2023-01-26 | End: 2023-01-31

## 2023-01-26 RX ORDER — SODIUM CHLORIDE 9 MG/ML
1000 INJECTION, SOLUTION INTRAVENOUS
Refills: 0 | Status: DISCONTINUED | OUTPATIENT
Start: 2023-01-26 | End: 2023-01-31

## 2023-01-26 RX ORDER — HEPARIN SODIUM 5000 [USP'U]/ML
5000 INJECTION INTRAVENOUS; SUBCUTANEOUS EVERY 8 HOURS
Refills: 0 | Status: DISCONTINUED | OUTPATIENT
Start: 2023-01-26 | End: 2023-01-31

## 2023-01-26 RX ORDER — ACETAMINOPHEN 500 MG
650 TABLET ORAL EVERY 6 HOURS
Refills: 0 | Status: DISCONTINUED | OUTPATIENT
Start: 2023-01-26 | End: 2023-01-31

## 2023-01-26 RX ORDER — DEXTROSE 50 % IN WATER 50 %
12.5 SYRINGE (ML) INTRAVENOUS ONCE
Refills: 0 | Status: DISCONTINUED | OUTPATIENT
Start: 2023-01-26 | End: 2023-01-26

## 2023-01-26 RX ORDER — INSULIN LISPRO 100/ML
VIAL (ML) SUBCUTANEOUS AT BEDTIME
Refills: 0 | Status: DISCONTINUED | OUTPATIENT
Start: 2023-01-26 | End: 2023-01-26

## 2023-01-26 RX ORDER — INSULIN LISPRO 100/ML
VIAL (ML) SUBCUTANEOUS
Refills: 0 | Status: DISCONTINUED | OUTPATIENT
Start: 2023-01-26 | End: 2023-01-26

## 2023-01-26 RX ORDER — INSULIN GLARGINE 100 [IU]/ML
36 INJECTION, SOLUTION SUBCUTANEOUS AT BEDTIME
Refills: 0 | Status: DISCONTINUED | OUTPATIENT
Start: 2023-01-26 | End: 2023-01-28

## 2023-01-26 RX ORDER — HYDRALAZINE HCL 50 MG
25 TABLET ORAL ONCE
Refills: 0 | Status: COMPLETED | OUTPATIENT
Start: 2023-01-26 | End: 2023-01-26

## 2023-01-26 RX ORDER — HYDROMORPHONE HYDROCHLORIDE 2 MG/ML
2 INJECTION INTRAMUSCULAR; INTRAVENOUS; SUBCUTANEOUS EVERY 4 HOURS
Refills: 0 | Status: DISCONTINUED | OUTPATIENT
Start: 2023-01-26 | End: 2023-01-31

## 2023-01-26 RX ORDER — AMLODIPINE BESYLATE 2.5 MG/1
10 TABLET ORAL DAILY
Refills: 0 | Status: DISCONTINUED | OUTPATIENT
Start: 2023-01-26 | End: 2023-01-31

## 2023-01-26 RX ORDER — GABAPENTIN 400 MG/1
1 CAPSULE ORAL
Qty: 0 | Refills: 0 | DISCHARGE

## 2023-01-26 RX ORDER — INSULIN GLARGINE 100 [IU]/ML
36 INJECTION, SOLUTION SUBCUTANEOUS AT BEDTIME
Refills: 0 | Status: DISCONTINUED | OUTPATIENT
Start: 2023-01-26 | End: 2023-01-26

## 2023-01-26 RX ORDER — INSULIN LISPRO 100/ML
VIAL (ML) SUBCUTANEOUS
Refills: 0 | Status: DISCONTINUED | OUTPATIENT
Start: 2023-01-26 | End: 2023-01-31

## 2023-01-26 RX ORDER — CHLORHEXIDINE GLUCONATE 213 G/1000ML
1 SOLUTION TOPICAL DAILY
Refills: 0 | Status: DISCONTINUED | OUTPATIENT
Start: 2023-01-26 | End: 2023-01-30

## 2023-01-26 RX ORDER — HYDRALAZINE HCL 50 MG
10 TABLET ORAL THREE TIMES A DAY
Refills: 0 | Status: DISCONTINUED | OUTPATIENT
Start: 2023-01-26 | End: 2023-01-26

## 2023-01-26 RX ORDER — ONDANSETRON 8 MG/1
4 TABLET, FILM COATED ORAL ONCE
Refills: 0 | Status: COMPLETED | OUTPATIENT
Start: 2023-01-26 | End: 2023-01-26

## 2023-01-26 RX ORDER — INFLUENZA VIRUS VACCINE 15; 15; 15; 15 UG/.5ML; UG/.5ML; UG/.5ML; UG/.5ML
0.5 SUSPENSION INTRAMUSCULAR ONCE
Refills: 0 | Status: COMPLETED | OUTPATIENT
Start: 2023-01-26 | End: 2023-01-26

## 2023-01-26 RX ORDER — INSULIN GLARGINE 100 [IU]/ML
38 INJECTION, SOLUTION SUBCUTANEOUS AT BEDTIME
Refills: 0 | Status: DISCONTINUED | OUTPATIENT
Start: 2023-01-26 | End: 2023-01-26

## 2023-01-26 RX ORDER — NIFEDIPINE 30 MG
30 TABLET, EXTENDED RELEASE 24 HR ORAL ONCE
Refills: 0 | Status: DISCONTINUED | OUTPATIENT
Start: 2023-01-26 | End: 2023-01-26

## 2023-01-26 RX ORDER — DEXTROSE 50 % IN WATER 50 %
12.5 SYRINGE (ML) INTRAVENOUS ONCE
Refills: 0 | Status: DISCONTINUED | OUTPATIENT
Start: 2023-01-26 | End: 2023-01-31

## 2023-01-26 RX ORDER — GLUCAGON INJECTION, SOLUTION 0.5 MG/.1ML
1 INJECTION, SOLUTION SUBCUTANEOUS ONCE
Refills: 0 | Status: DISCONTINUED | OUTPATIENT
Start: 2023-01-26 | End: 2023-01-31

## 2023-01-26 RX ORDER — LIDOCAINE 4 G/100G
1 CREAM TOPICAL EVERY 24 HOURS
Refills: 0 | Status: DISCONTINUED | OUTPATIENT
Start: 2023-01-26 | End: 2023-01-31

## 2023-01-26 RX ADMIN — Medication 10 MILLIGRAM(S): at 06:24

## 2023-01-26 RX ADMIN — Medication 50 MILLIGRAM(S): at 21:44

## 2023-01-26 RX ADMIN — MORPHINE SULFATE 4 MILLIGRAM(S): 50 CAPSULE, EXTENDED RELEASE ORAL at 00:29

## 2023-01-26 RX ADMIN — Medication 25 MILLIGRAM(S): at 18:13

## 2023-01-26 RX ADMIN — Medication 12: at 14:05

## 2023-01-26 RX ADMIN — ONDANSETRON 4 MILLIGRAM(S): 8 TABLET, FILM COATED ORAL at 18:06

## 2023-01-26 RX ADMIN — SODIUM CHLORIDE 1000 MILLILITER(S): 9 INJECTION INTRAMUSCULAR; INTRAVENOUS; SUBCUTANEOUS at 02:03

## 2023-01-26 RX ADMIN — Medication 9 UNIT(S): at 17:54

## 2023-01-26 RX ADMIN — SODIUM CHLORIDE 100 MILLILITER(S): 9 INJECTION INTRAMUSCULAR; INTRAVENOUS; SUBCUTANEOUS at 12:03

## 2023-01-26 RX ADMIN — Medication 5 UNIT(S): at 14:06

## 2023-01-26 RX ADMIN — HEPARIN SODIUM 5000 UNIT(S): 5000 INJECTION INTRAVENOUS; SUBCUTANEOUS at 06:27

## 2023-01-26 RX ADMIN — LOSARTAN POTASSIUM 100 MILLIGRAM(S): 100 TABLET, FILM COATED ORAL at 00:34

## 2023-01-26 RX ADMIN — AMLODIPINE BESYLATE 5 MILLIGRAM(S): 2.5 TABLET ORAL at 00:33

## 2023-01-26 RX ADMIN — Medication 5: at 09:16

## 2023-01-26 RX ADMIN — HYDROMORPHONE HYDROCHLORIDE 2 MILLIGRAM(S): 2 INJECTION INTRAMUSCULAR; INTRAVENOUS; SUBCUTANEOUS at 09:25

## 2023-01-26 RX ADMIN — HYDROMORPHONE HYDROCHLORIDE 2 MILLIGRAM(S): 2 INJECTION INTRAMUSCULAR; INTRAVENOUS; SUBCUTANEOUS at 08:55

## 2023-01-26 RX ADMIN — OXYCODONE HYDROCHLORIDE 5 MILLIGRAM(S): 5 TABLET ORAL at 03:09

## 2023-01-26 RX ADMIN — HEPARIN SODIUM 5000 UNIT(S): 5000 INJECTION INTRAVENOUS; SUBCUTANEOUS at 13:02

## 2023-01-26 RX ADMIN — PANTOPRAZOLE SODIUM 40 MILLIGRAM(S): 20 TABLET, DELAYED RELEASE ORAL at 06:27

## 2023-01-26 RX ADMIN — AMLODIPINE BESYLATE 10 MILLIGRAM(S): 2.5 TABLET ORAL at 06:24

## 2023-01-26 RX ADMIN — ONDANSETRON 4 MILLIGRAM(S): 8 TABLET, FILM COATED ORAL at 10:00

## 2023-01-26 RX ADMIN — Medication 650 MILLIGRAM(S): at 22:03

## 2023-01-26 RX ADMIN — Medication 20 MILLIGRAM(S): at 13:01

## 2023-01-26 RX ADMIN — INSULIN GLARGINE 36 UNIT(S): 100 INJECTION, SOLUTION SUBCUTANEOUS at 22:04

## 2023-01-26 RX ADMIN — Medication 4: at 17:55

## 2023-01-26 RX ADMIN — LIDOCAINE 1 PATCH: 4 CREAM TOPICAL at 06:44

## 2023-01-26 RX ADMIN — LIDOCAINE 1 PATCH: 4 CREAM TOPICAL at 18:15

## 2023-01-26 RX ADMIN — HEPARIN SODIUM 5000 UNIT(S): 5000 INJECTION INTRAVENOUS; SUBCUTANEOUS at 21:43

## 2023-01-26 RX ADMIN — ONDANSETRON 4 MILLIGRAM(S): 8 TABLET, FILM COATED ORAL at 00:34

## 2023-01-26 NOTE — DIETITIAN INITIAL EVALUATION ADULT - ADD RECOMMEND
1) New malnutrition notification sent.   2) Continue current diet order; consistent carbohydrate diet.   3) Recommend [place] with Carbsteady 2x/day (850 johana, 21 Gm protein) to optimize PO intake.   4) Encourage adequate intake of meals and supplements to optimize PO intake.   5) Monitor PO intake/tolerance, weights, labs, hydration status, bowels, and skin integrity.

## 2023-01-26 NOTE — PHYSICAL THERAPY INITIAL EVALUATION ADULT - PLANNED THERAPY INTERVENTIONS, PT EVAL
GOAL: Stair Negotiation Training: Patient will be able to negotiate up & down 1 flight of stairs independently with unilateral rail, step to gait pattern, in 2 weeks./balance training/gait training/strengthening/transfer training

## 2023-01-26 NOTE — PROGRESS NOTE ADULT - SUBJECTIVE AND OBJECTIVE BOX
Andres Peterson  PGY-1 Resident Physician     Patient is a 52y old  Female who presents with a chief complaint of MIKALA, MSK pains (2023 05:21)      SUBJECTIVE / OVERNIGHT EVENTS:  NAEON. Endorsing HA, abdominal pain located in epigastric region, & right leg/left hip pain. Remaining ROS (-).     MEDICATIONS  (STANDING):  amLODIPine   Tablet 10 milliGRAM(s) Oral daily  dextrose 5%. 1000 milliLiter(s) (50 mL/Hr) IV Continuous <Continuous>  dextrose 5%. 1000 milliLiter(s) (100 mL/Hr) IV Continuous <Continuous>  dextrose 50% Injectable 25 Gram(s) IV Push once  dextrose 50% Injectable 12.5 Gram(s) IV Push once  dextrose 50% Injectable 25 Gram(s) IV Push once  glucagon  Injectable 1 milliGRAM(s) IntraMuscular once  heparin   Injectable 5000 Unit(s) SubCutaneous every 8 hours  hydrALAZINE 20 milliGRAM(s) Oral three times a day  influenza   Vaccine 0.5 milliLiter(s) IntraMuscular once  insulin glargine Injectable (LANTUS) 36 Unit(s) SubCutaneous at bedtime  insulin lispro (ADMELOG) corrective regimen sliding scale   SubCutaneous three times a day before meals  insulin lispro (ADMELOG) corrective regimen sliding scale   SubCutaneous at bedtime  insulin lispro Injectable (ADMELOG) 5 Unit(s) SubCutaneous three times a day before meals  lidocaine   4% Patch 1 Patch Transdermal every 24 hours  pantoprazole    Tablet 40 milliGRAM(s) Oral before breakfast  predniSONE   Tablet 10 milliGRAM(s) Oral daily  sodium chloride 0.9%. 1000 milliLiter(s) (100 mL/Hr) IV Continuous <Continuous>    MEDICATIONS  (PRN):  acetaminophen     Tablet .. 650 milliGRAM(s) Oral every 6 hours PRN Temp greater or equal to 38C (100.4F), Mild Pain (1 - 3)  albuterol    90 MICROgram(s) HFA Inhaler 2 Puff(s) Inhalation every 6 hours PRN Shortness of Breath and/or Wheezing  dextrose Oral Gel 15 Gram(s) Oral once PRN Blood Glucose LESS THAN 70 milliGRAM(s)/deciliter  HYDROmorphone   Tablet 2 milliGRAM(s) Oral every 4 hours PRN Severe Pain (7 - 10)  ondansetron Injectable 4 milliGRAM(s) IV Push every 8 hours PRN Nausea and/or Vomiting    Allergies    No Known Allergies    Intolerances        Vital Signs Last 24 Hrs  T(C): 36.6 (2023 10:16), Max: 36.9 (2023 03:05)  T(F): 97.9 (2023 10:16), Max: 98.4 (2023 03:05)  HR: 116 (2023 10:16) (99 - 116)  BP: 184/115 (2023 10:16) (170/104 - 225/120)  BP(mean): 133 (2023 03:05) (133 - 153)  RR: 16 (2023 06:22) (16 - 20)  SpO2: 100% (2023 06:22) (99% - 100%)    Parameters below as of 2023 06:22  Patient On (Oxygen Delivery Method): room air      Daily     Daily   I&O's Summary      Physical Exam  Gen: in mild distress from nausea and LE pain  HEENT: atraumatic, normocephalic, EOMI  CV: RRR, no murmurs  Resp: normal breath sounds  GI: soft, nontender, nondistended, BS+  MSK: extremities atraumatic, R knee and lower leg TTP diffusely, L hip TTP, no swelling or erythema noted. No edema.  Skin: warm, dry, no rashes or lesions  Neuro: no focal deficits, sensation grossly intact  Psych: alert and oriented x3, appropriate mood and affect    DIAGNOSTICS:                         10.2   7.89  )-----------( 331      ( 2023 19:38 )             31.0     Hgb Trend: 10.2<--      135  |  102  |  55<H>  ----------------------------<  360<H>  5.3   |  18<L>  |  4.90<H>    Ca    9.5      2023 19:38    TPro  7.2  /  Alb  3.6  /  TBili  0.2  /  DBili  x   /  AST  13  /  ALT  9<L>  /  AlkPhos  48      CAPILLARY BLOOD GLUCOSE      POCT Blood Glucose.: 359 mg/dL (2023 09:11)    Creatinine Trend: 4.90<--  LIVER FUNCTIONS - ( 2023 19:38 )  Alb: 3.6 g/dL / Pro: 7.2 g/dL / ALK PHOS: 48 U/L / ALT: 9 U/L / AST: 13 U/L / GGT: x             CARDIAC MARKERS ( 2023 19:38 )  x     / x     / 26 U/L / x     / x          Urinalysis Basic - ( 2023 00:42 )    Color: Light Yellow / Appearance: Clear / S.016 / pH: x  Gluc: x / Ketone: Negative  / Bili: Negative / Urobili: Negative   Blood: x / Protein: >600 / Nitrite: Negative   Leuk Esterase: Negative / RBC: 11 /hpf / WBC 2 /HPF   Sq Epi: x / Non Sq Epi: 2 /hpf / Bacteria: Negative

## 2023-01-26 NOTE — DIETITIAN INITIAL EVALUATION ADULT - PERTINENT MEDS FT
MEDICATIONS  (STANDING):  amLODIPine   Tablet 10 milliGRAM(s) Oral daily  chlorhexidine 2% Cloths 1 Application(s) Topical daily  dextrose 5%. 1000 milliLiter(s) (100 mL/Hr) IV Continuous <Continuous>  dextrose 5%. 1000 milliLiter(s) (50 mL/Hr) IV Continuous <Continuous>  dextrose 50% Injectable 25 Gram(s) IV Push once  dextrose 50% Injectable 12.5 Gram(s) IV Push once  dextrose 50% Injectable 25 Gram(s) IV Push once  glucagon  Injectable 1 milliGRAM(s) IntraMuscular once  heparin   Injectable 5000 Unit(s) SubCutaneous every 8 hours  hydrALAZINE 20 milliGRAM(s) Oral three times a day  influenza   Vaccine 0.5 milliLiter(s) IntraMuscular once  insulin glargine Injectable (LANTUS) 36 Unit(s) SubCutaneous at bedtime  insulin lispro (ADMELOG) corrective regimen sliding scale   SubCutaneous three times a day before meals  insulin lispro (ADMELOG) corrective regimen sliding scale   SubCutaneous at bedtime  insulin lispro Injectable (ADMELOG) 9 Unit(s) SubCutaneous three times a day before meals  lidocaine   4% Patch 1 Patch Transdermal every 24 hours  pantoprazole    Tablet 40 milliGRAM(s) Oral before breakfast  predniSONE   Tablet 10 milliGRAM(s) Oral daily  sodium chloride 0.9%. 1000 milliLiter(s) (100 mL/Hr) IV Continuous <Continuous>    MEDICATIONS  (PRN):  acetaminophen     Tablet .. 650 milliGRAM(s) Oral every 6 hours PRN Temp greater or equal to 38C (100.4F), Mild Pain (1 - 3)  albuterol    90 MICROgram(s) HFA Inhaler 2 Puff(s) Inhalation every 6 hours PRN Shortness of Breath and/or Wheezing  dextrose Oral Gel 15 Gram(s) Oral once PRN Blood Glucose LESS THAN 70 milliGRAM(s)/deciliter  HYDROmorphone   Tablet 2 milliGRAM(s) Oral every 4 hours PRN Severe Pain (7 - 10)  ondansetron Injectable 4 milliGRAM(s) IV Push every 8 hours PRN Nausea and/or Vomiting

## 2023-01-26 NOTE — DIETITIAN INITIAL EVALUATION ADULT - ORAL INTAKE PTA/DIET HISTORY
Pt reports variable appetite/PO intake x 2-3 months. Reports progressively worsening appetite/PO intake in the past few weeks.   - NKFA/intolerances reported.   - No therapeutic restrictions reported. Of note, reports she avoids fatty foods and cheese secondary to hx of gallbladder removal. Reports she does not eat pork.   - Micronutrient/Other supplementation: none   - Protein-energy supplementation: none   - No hx of chewing/swallowing difficulties.   Pt with hx of DM, confirms use of Trulicity and Glargine. Confirms use of moshe, with range between 200-300 mg/dl. No HbA1c yet documented in chart.

## 2023-01-26 NOTE — DIETITIAN INITIAL EVALUATION ADULT - REASON INDICATOR FOR ASSESSMENT
Nutrition consult warranted for: unintentional weight loss PTA  Information obtained from: electronic medical record and patient  Chart reviewed, events noted.

## 2023-01-26 NOTE — PHYSICAL THERAPY INITIAL EVALUATION ADULT - ADDITIONAL COMMENTS
Prior to hospitalization patient resides with her 5 adult children in a 2 family house with 6 steps to enter and 12 steps to 2nd level. Patient is assisted with ADLs by her children or by CDPAP 6 hours/5 days. Patient states she was independent with ambulation however has been having difficulty due to pain.

## 2023-01-26 NOTE — H&P ADULT - PROBLEM SELECTOR PLAN 1
Baseline SCr 2.9 11/2022. Increased to 4.19 1/11 and now 4.9 on admission  2/2 poor PO intake, uncontrolled HTN and DM  - holding home torsemide and losartan  - avoid nephrotoxins, renally dose medications  - obtain urine studies  - s/p 2L IVF, monitor SCr and UOP  - consider nephrology consult if SCr continues to worsen Baseline SCr 2.9 11/2022. Increased to 4.19 1/11 and now 4.9 on admission  2/2 poor PO intake, uncontrolled HTN and DM  - holding home torsemide and losartan  - avoid nephrotoxins, renally dose medications  - obtain urine studies  - Renal US  - s/p 2L IVF, monitor SCr and UOP  - consider nephrology consult if SCr continues to worsen

## 2023-01-26 NOTE — DIETITIAN INITIAL EVALUATION ADULT - REASON FOR ADMISSION
Per chart, "52F with sarcoidosis, T2DM, and CKD who presents c/o b/l MSK pains x 1.5 mo and n/v x 1 week. She states that the pains are constant. Denies noticing any erythema, swelling or rash. Pain involves R knee down the shin and L hip. States that she uses tylenol, tramadol and diclofenac gel for the pain. States that she has had poor PO intake over the past week and has felt increasingly nauseous, seemingly exacerbated by tramadol, over the past few days. Unable to take BP pills over the past two days because of nausea. Still having BM and passing."

## 2023-01-26 NOTE — H&P ADULT - PROBLEM SELECTOR PLAN 4
Home: amlodipine 5 mg daily, losartan 100 mg daily  Acutely worsened iso pain  - c/w amlodipine 5 daily  - start hydral 10 TID  - hold home losartan iso MIKALA

## 2023-01-26 NOTE — H&P ADULT - NSHPLABSRESULTS_GEN_ALL_CORE
10.2   7.89  )-----------( 331      ( 2023 19:38 )             31.0           135  |  102  |  55<H>  ----------------------------<  360<H>  5.3   |  18<L>  |  4.90<H>    Ca    9.5      2023 19:38    TPro  7.2  /  Alb  3.6  /  TBili  0.2  /  DBili  x   /  AST  13  /  ALT  9<L>  /  AlkPhos  48                Urinalysis Basic - ( 2023 00:42 )    Color: Light Yellow / Appearance: Clear / S.016 / pH: x  Gluc: x / Ketone: Negative  / Bili: Negative / Urobili: Negative   Blood: x / Protein: >600 / Nitrite: Negative   Leuk Esterase: Negative / RBC: 11 /hpf / WBC 2 /HPF   Sq Epi: x / Non Sq Epi: 2 /hpf / Bacteria: Negative            Lactate Trend            CAPILLARY BLOOD GLUCOSE    XR TIB+FIB  FINDINGS:  No acute fracture or dislocation.  No knee joint effusion or soft tissue swelling.    IMPRESSION:  No acute fracture or dislocation.      XR HIP  IMPRESSION:  No hip fractures or dislocations.    Intact pelvic and obturator rings and symmetrically aligned and spaced SI   joints.    Pubic symphysis degenerative changes again noted. Preserved bilateral hip   joint spaces. No gross radiographic evidence for AVN.    Redemonstrated tiny bone islands in peripheral anterior left iliac wing.

## 2023-01-26 NOTE — DIETITIAN INITIAL EVALUATION ADULT - PERTINENT LABORATORY DATA
01-25    135  |  102  |  55<H>  ----------------------------<  360<H>  5.3   |  18<L>  |  4.90<H>    Ca    9.5      25 Jan 2023 19:38    TPro  7.2  /  Alb  3.6  /  TBili  0.2  /  DBili  x   /  AST  13  /  ALT  9<L>  /  AlkPhos  48  01-25  POCT Blood Glucose.: 429 mg/dL (01-26-23 @ 14:04)

## 2023-01-26 NOTE — DIETITIAN INITIAL EVALUATION ADULT - NSFNSGIIOFT_GEN_A_CORE
- Pt denies nausea, vomiting, diarrhea, or constipation.   - Last BM:PTA; not currently ordered for bowel regimen

## 2023-01-26 NOTE — H&P ADULT - PROBLEM SELECTOR PLAN 5
Home: Basaglar 36u qhs, Jardiance 10, Trulicity qweek  - c/w lantus 36u qhs (monitor FS iso worsening renal fx), SSI  - f/u A1c

## 2023-01-26 NOTE — H&P ADULT - NSHPREVIEWOFSYSTEMS_GEN_ALL_CORE
Constitutional: +weight loss, no fever, no chills, no night sweats, no fatigue  ENT/mouth: no hearing changes, no sore throat, no rhinorrhea  Eyes: no eye pain, no eye redness, no eye swelling, no vision changes  CV: no chest pain, no orthopnea, no palpitations  Resp: +shortness of breath, no cough, no wheezing  GI: no abdominal pain, +nausea, +vomiting, no diarrhea, no constipation  : no dysuria, no urinary frequency or hesitancy, no hematuria  Skin/MSK: +pain, no rashes, no lower extremity edema  Neuro: +weakness, no numbness, no loss of consciousness, no syncope, no dizziness, no headache Constitutional: +weight loss, no fever, no chills, no night sweats, no fatigue  ENT/mouth: no hearing changes, no sore throat, no rhinorrhea  Eyes: no eye pain, no eye redness, no eye swelling, no vision changes  CV: no chest pain, no orthopnea, no palpitations  Resp: +shortness of breath, no cough, no wheezing  GI: no abdominal pain, +nausea, +vomiting, no diarrhea, no constipation  : no dysuria, no urinary frequency or hesitancy, no hematuria  Skin/MSK: +pain, no rashes, no lower extremity edema  Neuro: +weakness, no numbness, no loss of consciousness, no syncope, no dizziness, no headache  PSYCHIATRIC: No depression, anxiety, mood swings, or difficulty sleeping  HEME/LYMPH: No easy bruising, or bleeding gums; no enlarged LN

## 2023-01-26 NOTE — DIETITIAN INITIAL EVALUATION ADULT - SIGNS/SYMPTOMS
12% wt loss x 2 months, moderate findings of muscle/fat depletion, </75% of nutrition needs >/1month

## 2023-01-26 NOTE — DIETITIAN INITIAL EVALUATION ADULT - PHYSCIAL ASSESSMENT
Weight Hx Per:  - Source: patient   - UBW: 128 pounds   - Reported weight changes: Pt reports significant weight loss, reports dosing weight is incorrect. States that she weighed 113 pounds during outpatient visit last week.     [facility]  - 57.2 kg (8/18/2021)  - 54 kg (7/27/2022)  - 52.2 kg (11/1/2022)    Current Admission Weights:  - Dosing weight: 130.1 pounds (59 kg) 1/25   ?accuracy. Weight trend per [facility] is downtrending.   - Daily weight:     Weight Change:  - 12% weight loss x 2 months, clinically significant. (UBW vs current weight as stated by pt: 113 pounds)    **  Will continue to monitor weight trends as available/able.   Ht: 5'1 (obtained from [facility])  IBW: 105 pounds   %IBW: 108%    Nutrition Focused Physical Exam performed with pt's consent.

## 2023-01-26 NOTE — DIETITIAN INITIAL EVALUATION ADULT - OTHER INFO
Endo:   - BG being managed with Lantus, Lispro SSI.  - Finger sticks today 429, 359 mg/dl.   - Ordered for Deltasone.     Renal:   - Pt with CKD stage 3.   - Electrolytes WDL.

## 2023-01-26 NOTE — DIETITIAN INITIAL EVALUATION ADULT - ENERGY INTAKE
Pt reports continued poor appetite and PO intake since admission, is currently ordered for a consistent carbohydrate diet. Pt is amenable to receiving oral nutrition supplements at this time. Pt made aware of menu ordering procedures in house, food preferences obtained will honor as able. Pt confirms having no further questions at this time.

## 2023-01-26 NOTE — PROGRESS NOTE ADULT - PROBLEM SELECTOR PLAN 2
-Home: Basaglar 36u qhs, Jardiance 10, Trulicity qweek  > c/w lantus 36u qhs, ademelog 5, MDISS  > f/u A1c

## 2023-01-26 NOTE — H&P ADULT - PROBLEM SELECTOR PLAN 3
Concern for gastroparesis iso uncontrolled DM vs opioid SE. Pt having BM and passing gas. Abd exam benign. Low concern for SBO.  - Zofran PRN

## 2023-01-26 NOTE — PROGRESS NOTE ADULT - ATTENDING COMMENTS
51 yo f w pmh dm, htn, hld, ckd, sarcoidosis, chronic pain, p/w myalgias + arthralgias, found to have karla on ckd vs progressing ckd + uncontrolled dm + uncontrolled bp, admitted to medicine for further mgmt    of note, known to be nonadherent/noncompliant with medications and follow up visits.    karla on ckd  unclear baseline cr; 7/2022, cr 2.91 -> 9/2022, cr 3.36 ->11/2022, cr 2.94 -> 1/11/2023, cr 4.19 ->1/25/23, cr 4.9  per outpatient nephrology documentation; ckd deemed to be 2/2 diabetic nephropathy  s/p 2 L NS in ER  Monitor strict I/Os, daily weights, UO, BUN/Cr, volume status, acid-base balance, electrolytes   obtain urine studies + calculate FENa/FEUrea, renal/bladder US   avoid nephrotoxic agents (including home acei/arb + diuretics); appropriate dose adjustments for all renally cleared medications   can consider nephro consult if no improvement noted to kidney function     poorly controlled dm  obtain a1c  monitor fingersticks tidac + hs  carb consistent diet  hold home antidm meds, start home dose basal glargine + low dose correctional scale lispro tidac + hs; adjust to maintain goal bg 140-180 mg/dl    poorly controlled htn  replace home nephrotoxic antihtn with hydralazine; continues to be elevated; will increase dosing of hydralazine   - can also consider adding isodril vs imdur  adjust antihtn regimen to maintain goal bp <150/90 mmhg    PT eval pending     Rest as above. Discussed with HS.

## 2023-01-26 NOTE — H&P ADULT - ATTENDING COMMENTS
51 yo f w pmh dm, htn, hld, ckd, sarcoidosis, chronic pain, p/w myalgias + arthralgias, found to have karla on ckd vs progressing ckd + uncontrolled dm + uncontrolled bp, admitted to medicine for further mgmt    of note, known to be nonadherent/noncompliant with medications and follow up visits.    karla on ckd  unclear baseline cr; 7/2022, cr 2.91 -> 9/2022, cr 3.36 ->11/2022, cr 2.94 -> 1/11/2023, cr 4.19 ->1/25/23, cr 4.9  outpatient nephrology documentation reviewed; ckd deemed to be 2/2 diabetic nephropathy  s/p 2 L NS in ER  Monitor strict I/Os, daily weights, UO, BUN/Cr, volume status, acid-base balance, electrolytes   obtain urine studies + calculate FENa/FEUrea, renal/bladder US   avoid nephrotoxic agents (including home acei/arb + diuretics); appropriate dose adjustments for all renally cleared medications   nephro consult in am    poorly controlled dm  obtain a1c  monitor fingersticks tidac + hs  carb consistent diet  hold home antidm meds, start home dose basal glargine + low dose correctional scale lispro tidac + hs; adjust to maintain goal bg 140-180 mg/dl    poorly controlled htn  replace home nephrotoxic antihtn with hydralazine +/- isodril vs imdur  adjust antihtn regimen to maintain goal bp <150/90 mmhg    otherwise, concur with a+p as described by resident above

## 2023-01-26 NOTE — H&P ADULT - PROBLEM SELECTOR PLAN 2
Introductory lactation consult with Cooper Villeda and new baby girl. Ronal reports breastfeeding their son until late 2017, well into the pregnancy with this baby without issue. Breastfeeding has been going very well since delivery and infant has had over 9 good feedings since birth. Parents report she has been less interested this morning. Discussed typical expectations and recommendations, answering questions. See Infant education record.  Provided Lactation Support Services introductory packet with Lactation Consultant contact information. Encouraged to call with questions, concerns or for assistance with feedings as needed or desired.    Raad Lai RN, IBCLC  
XR LE w/o acute findings. Appears to involve both joint and muscles. Possible myopathy?  - Hold home rosuvastatin  - Obtain TSH, CK, ESR, CRP. Can consider inflammatory myopathy markers if elevated.  - Lidocaine patch  - Dilaudid 2 mg q4h for severe pain

## 2023-01-26 NOTE — DIETITIAN NUTRITION RISK NOTIFICATION - TREATMENT: THE FOLLOWING DIET HAS BEEN RECOMMENDED
Diet, Regular:   Consistent Carbohydrate {Evening Snack} (CSTCHOSN) (01-26-23 @ 04:00) [Active]

## 2023-01-26 NOTE — H&P ADULT - ASSESSMENT
52F with sarcoidosis, T2DM, and CKD who presents c/o b/l MSK pains x 1.5 mo and n/v x 1 week, admitted for myalgias/arthralgias and MIKALA on CKD.

## 2023-01-26 NOTE — H&P ADULT - NSHPPHYSICALEXAM_GEN_ALL_CORE
Vital Signs Last 24 Hrs  T(C): 36.9 (26 Jan 2023 05:09), Max: 36.9 (26 Jan 2023 03:05)  T(F): 98.4 (26 Jan 2023 05:09), Max: 98.4 (26 Jan 2023 03:05)  HR: 105 (26 Jan 2023 05:09) (99 - 111)  BP: 176/91 (26 Jan 2023 05:09) (170/104 - 225/120)  BP(mean): 133 (26 Jan 2023 03:05) (133 - 153)  RR: 16 (26 Jan 2023 05:09) (16 - 20)  SpO2: 100% (26 Jan 2023 05:09) (99% - 100%)    Parameters below as of 26 Jan 2023 05:09  Patient On (Oxygen Delivery Method): room air      Gen: in mild distress from nausea and LE pain  HEENT: atraumatic, normocephalic, EOMI  CV: RRR, no murmurs  Resp: decreased basilar breath sounds  GI: soft, nontender, nondistended, BS+  MSK: extremities atraumatic, R knee and lower leg TTP diffusely, L hip TTP, no swelling or erythema noted. No edema.  Skin: warm, dry, no rashes or lesions  Neuro: no focal deficits, sensation grossly intact  Psych: alert and oriented x3, appropriate mood and affect

## 2023-01-26 NOTE — PHYSICAL THERAPY INITIAL EVALUATION ADULT - PERTINENT HX OF CURRENT PROBLEM, REHAB EVAL
52F with sarcoidosis, T2DM, and CKD who presents c/o b/l MSK pains x 1.5 mo and n/v x 1 week. She states that the pains are constant. Denies noticing any erythema, swelling or rash. Pain involves R knee down the shin and L hip. States that she uses tylenol, tramadol and diclofenac gel for the pain. States that she has had poor PO intake over the past week and has felt increasingly nauseous, seemingly exacerbated by tramadol, over the past few days. Unable to take BP pills over the past two days because of nausea. In the ED, HR sinus 90-110s and /120s. Labs significant for SCr 4.9 and  mg/dl prot. CTH, c-spine wnl. CXR clear. XR R knee, tib+fib, L hip wnl. S/p 2L IVF and pain medication. Admitted to medicine for further management. 52F with sarcoidosis, T2DM, and CKD who presents c/o b/l MSK pains x 1.5 mo and n/v x 1 week. She states that the pains are constant. Denies noticing any erythema, swelling or rash. Pain involves R knee down the shin and L hip. States that she uses tylenol, tramadol and diclofenac gel for the pain. States that she has had poor PO intake over the past week and has felt increasingly nauseous, seemingly exacerbated by tramadol, over the past few days. Unable to take BP pills over the past two days because of nausea. In the ED, HR sinus 90-110s and /120s. Labs significant for SCr 4.9 and  mg/dl prot. CTH, c-spine wnl. CXR clear. XR R knee, tib+fib, L hip wnl. S/p 2L IVF and pain medication. Admitted to medicine for further management. US Abd: Increased echogenicity bilaterally, suggestive of underlying renal parenchymal disease.

## 2023-01-26 NOTE — DIETITIAN INITIAL EVALUATION ADULT - PROBLEM/PLAN-1
DISPLAY PLAN FREE TEXT
Principal Discharge DX:	Hyperglycemia  Secondary Diagnosis:	Dyspnea and respiratory abnormalities

## 2023-01-26 NOTE — DIETITIAN INITIAL EVALUATION ADULT - PERSON TAUGHT/METHOD
Emphasized the importance of adequate kcal and protein intake, provided recommendations to optimize nutritional intake in case of decreased appetite, recommended small frequent meals by consuming nutrient-dense snacks between meals, to start with protein, and sips of supplement throughout the day./verbal instruction/teach back - (Patient repeats in own words)/patient instructed

## 2023-01-26 NOTE — H&P ADULT - HISTORY OF PRESENT ILLNESS
52F with sarcoidosis, T2DM, and CKD who presents c/o b/l MSK pains x 1.5 mo and n/v x 1 week. She states that the pains are constant. Denies noticing any erythema, swelling or rash. Pain involves R knee down the shin and L hip. States that she uses tylenol, tramadol and diclofenac gel for the pain. States that she has had poor PO intake over the past week and has felt increasingly nauseous, seemingly exacerbated by tramadol, over the past few days. Unable to take BP pills over the past two days because of nausea. Still having BM and passing gas. FS over past 2wks reviewed, running 200-400s. Denies any urinary sx. Of note, SCr was 4.19 1/11 and was advised by nephrology to cut torsemide from 10 to 5, however pt states pharmacy did not get rx so she has continued to take torsemide 10. Pt denies fevers or CP. Endorses chronic dyspnea. In the ED, HR sinus 90-110s and /120s. Labs significant for SCr 4.9 and  mg/dl prot. CTH, c-spine wnl. CXR clear. XR R knee, tib+fib, L hip wnl. S/p 2L IVF and pain medication. Admitted to medicine for further management.

## 2023-01-26 NOTE — PATIENT PROFILE ADULT - FALL HARM RISK - RISK INTERVENTIONS
Assistance OOB with selected safe patient handling equipment/Assistance with ambulation/Communicate Fall Risk and Risk Factors to all staff, patient, and family/Discuss with provider need for PT consult/Monitor gait and stability/Reinforce activity limits and safety measures with patient and family/Visual Cue: Yellow wristband/Bed in lowest position, wheels locked, appropriate side rails in place/Call bell, personal items and telephone in reach/Instruct patient to call for assistance before getting out of bed or chair/Non-slip footwear when patient is out of bed/[place] to call system/Physically safe environment - no spills, clutter or unnecessary equipment/Purposeful Proactive Rounding/Room/bathroom lighting operational, light cord in reach

## 2023-01-27 LAB
A1C WITH ESTIMATED AVERAGE GLUCOSE RESULT: 10.6 % — HIGH (ref 4–5.6)
ALBUMIN SERPL ELPH-MCNC: 3.1 G/DL — LOW (ref 3.3–5)
ALP SERPL-CCNC: 44 U/L — SIGNIFICANT CHANGE UP (ref 40–120)
ALT FLD-CCNC: 9 U/L — LOW (ref 10–45)
ANION GAP SERPL CALC-SCNC: 12 MMOL/L — SIGNIFICANT CHANGE UP (ref 5–17)
AST SERPL-CCNC: 13 U/L — SIGNIFICANT CHANGE UP (ref 10–40)
BILIRUB SERPL-MCNC: 0.1 MG/DL — LOW (ref 0.2–1.2)
BUN SERPL-MCNC: 50 MG/DL — HIGH (ref 7–23)
CALCIUM SERPL-MCNC: 8.9 MG/DL — SIGNIFICANT CHANGE UP (ref 8.4–10.5)
CHLORIDE SERPL-SCNC: 110 MMOL/L — HIGH (ref 96–108)
CO2 SERPL-SCNC: 19 MMOL/L — LOW (ref 22–31)
CREAT SERPL-MCNC: 4.6 MG/DL — HIGH (ref 0.5–1.3)
EGFR: 11 ML/MIN/1.73M2 — LOW
ESTIMATED AVERAGE GLUCOSE: 258 MG/DL — HIGH (ref 68–114)
GLUCOSE BLDC GLUCOMTR-MCNC: 100 MG/DL — HIGH (ref 70–99)
GLUCOSE BLDC GLUCOMTR-MCNC: 102 MG/DL — HIGH (ref 70–99)
GLUCOSE BLDC GLUCOMTR-MCNC: 169 MG/DL — HIGH (ref 70–99)
GLUCOSE BLDC GLUCOMTR-MCNC: 208 MG/DL — HIGH (ref 70–99)
GLUCOSE BLDC GLUCOMTR-MCNC: 219 MG/DL — HIGH (ref 70–99)
GLUCOSE SERPL-MCNC: 102 MG/DL — HIGH (ref 70–99)
HCT VFR BLD CALC: 29.1 % — LOW (ref 34.5–45)
HGB BLD-MCNC: 9.6 G/DL — LOW (ref 11.5–15.5)
MAGNESIUM SERPL-MCNC: 1.9 MG/DL — SIGNIFICANT CHANGE UP (ref 1.6–2.6)
MCHC RBC-ENTMCNC: 27.3 PG — SIGNIFICANT CHANGE UP (ref 27–34)
MCHC RBC-ENTMCNC: 33 GM/DL — SIGNIFICANT CHANGE UP (ref 32–36)
MCV RBC AUTO: 82.7 FL — SIGNIFICANT CHANGE UP (ref 80–100)
MRSA PCR RESULT.: SIGNIFICANT CHANGE UP
NRBC # BLD: 0 /100 WBCS — SIGNIFICANT CHANGE UP (ref 0–0)
PHOSPHATE SERPL-MCNC: 4.5 MG/DL — SIGNIFICANT CHANGE UP (ref 2.5–4.5)
PLATELET # BLD AUTO: 296 K/UL — SIGNIFICANT CHANGE UP (ref 150–400)
POTASSIUM SERPL-MCNC: 4.3 MMOL/L — SIGNIFICANT CHANGE UP (ref 3.5–5.3)
POTASSIUM SERPL-SCNC: 4.3 MMOL/L — SIGNIFICANT CHANGE UP (ref 3.5–5.3)
PROT SERPL-MCNC: 6.1 G/DL — SIGNIFICANT CHANGE UP (ref 6–8.3)
RBC # BLD: 3.52 M/UL — LOW (ref 3.8–5.2)
RBC # FLD: 12.5 % — SIGNIFICANT CHANGE UP (ref 10.3–14.5)
S AUREUS DNA NOSE QL NAA+PROBE: SIGNIFICANT CHANGE UP
SODIUM SERPL-SCNC: 141 MMOL/L — SIGNIFICANT CHANGE UP (ref 135–145)
WBC # BLD: 6.5 K/UL — SIGNIFICANT CHANGE UP (ref 3.8–10.5)
WBC # FLD AUTO: 6.5 K/UL — SIGNIFICANT CHANGE UP (ref 3.8–10.5)

## 2023-01-27 PROCEDURE — 99233 SBSQ HOSP IP/OBS HIGH 50: CPT | Mod: GC

## 2023-01-27 PROCEDURE — 99222 1ST HOSP IP/OBS MODERATE 55: CPT | Mod: GC

## 2023-01-27 RX ORDER — HYDRALAZINE HCL 50 MG
100 TABLET ORAL THREE TIMES A DAY
Refills: 0 | Status: DISCONTINUED | OUTPATIENT
Start: 2023-01-27 | End: 2023-01-31

## 2023-01-27 RX ORDER — SODIUM CHLORIDE 9 MG/ML
1000 INJECTION INTRAMUSCULAR; INTRAVENOUS; SUBCUTANEOUS
Refills: 0 | Status: DISCONTINUED | OUTPATIENT
Start: 2023-01-27 | End: 2023-01-28

## 2023-01-27 RX ADMIN — HEPARIN SODIUM 5000 UNIT(S): 5000 INJECTION INTRAVENOUS; SUBCUTANEOUS at 14:52

## 2023-01-27 RX ADMIN — Medication 9 UNIT(S): at 17:43

## 2023-01-27 RX ADMIN — LIDOCAINE 1 PATCH: 4 CREAM TOPICAL at 17:46

## 2023-01-27 RX ADMIN — Medication 9 UNIT(S): at 12:51

## 2023-01-27 RX ADMIN — Medication 4: at 12:51

## 2023-01-27 RX ADMIN — SODIUM CHLORIDE 80 MILLILITER(S): 9 INJECTION INTRAMUSCULAR; INTRAVENOUS; SUBCUTANEOUS at 17:44

## 2023-01-27 RX ADMIN — INSULIN GLARGINE 36 UNIT(S): 100 INJECTION, SOLUTION SUBCUTANEOUS at 21:42

## 2023-01-27 RX ADMIN — Medication 650 MILLIGRAM(S): at 05:56

## 2023-01-27 RX ADMIN — SODIUM CHLORIDE 80 MILLILITER(S): 9 INJECTION INTRAMUSCULAR; INTRAVENOUS; SUBCUTANEOUS at 14:53

## 2023-01-27 RX ADMIN — LIDOCAINE 1 PATCH: 4 CREAM TOPICAL at 08:38

## 2023-01-27 RX ADMIN — Medication 4: at 08:43

## 2023-01-27 RX ADMIN — SODIUM CHLORIDE 80 MILLILITER(S): 9 INJECTION INTRAMUSCULAR; INTRAVENOUS; SUBCUTANEOUS at 12:54

## 2023-01-27 RX ADMIN — HEPARIN SODIUM 5000 UNIT(S): 5000 INJECTION INTRAVENOUS; SUBCUTANEOUS at 21:10

## 2023-01-27 RX ADMIN — CHLORHEXIDINE GLUCONATE 1 APPLICATION(S): 213 SOLUTION TOPICAL at 13:03

## 2023-01-27 RX ADMIN — AMLODIPINE BESYLATE 10 MILLIGRAM(S): 2.5 TABLET ORAL at 05:48

## 2023-01-27 RX ADMIN — Medication 650 MILLIGRAM(S): at 06:26

## 2023-01-27 RX ADMIN — Medication 9 UNIT(S): at 08:43

## 2023-01-27 RX ADMIN — Medication 100 MILLIGRAM(S): at 14:52

## 2023-01-27 RX ADMIN — Medication 100 MILLIGRAM(S): at 21:10

## 2023-01-27 RX ADMIN — Medication 10 MILLIGRAM(S): at 05:47

## 2023-01-27 RX ADMIN — Medication 50 MILLIGRAM(S): at 05:47

## 2023-01-27 RX ADMIN — HEPARIN SODIUM 5000 UNIT(S): 5000 INJECTION INTRAVENOUS; SUBCUTANEOUS at 05:48

## 2023-01-27 RX ADMIN — PANTOPRAZOLE SODIUM 40 MILLIGRAM(S): 20 TABLET, DELAYED RELEASE ORAL at 06:28

## 2023-01-27 RX ADMIN — LIDOCAINE 1 PATCH: 4 CREAM TOPICAL at 04:51

## 2023-01-27 RX ADMIN — SODIUM CHLORIDE 80 MILLILITER(S): 9 INJECTION INTRAMUSCULAR; INTRAVENOUS; SUBCUTANEOUS at 10:59

## 2023-01-27 RX ADMIN — LIDOCAINE 1 PATCH: 4 CREAM TOPICAL at 05:58

## 2023-01-27 NOTE — CONSULT NOTE ADULT - SUBJECTIVE AND OBJECTIVE BOX
LEONEL SIRIA  [id number]    HISTORY OF PRESENT ILLNESS:  52-year-old F w/hx of sarcoidosis DM2 on insulin, seizure, CKD stage IV presented with presented with R knee pain started over a month after stopping her gabapentin. Rheumatologist was consulted for further evaluation.     On admission, her BP was 220/120. Labs were remarkable for glucose 331, BUN 55, creatinine 4.9, Ca++ 9.5, UA showed protein 600 mg/dl. XR hip/tib/fib were all negative.     Hospital summary:  Patient was treated for her hyperglycemia. The CT head and spine were negative. The R knee XR was also negative.     Consult day:   23  Patient was seen at the bedside. Patient stated that her pain improved significantly after starting pain medication in the hospital. Patient stated that the pain is located in the R leg and described as sharp burning sensation. She stated that the pain started 2 month ago after stopping her gabapentin.     ROS:  Positive: R burning sensation on the leg, foamy urine  Negative: fevers, chills, night sweats, eye pain, eye redness, vision changes, photosensitivity, abdominal pain, n/v/d/c, foamy urine, visual change, joint swelling     #Sarcoidosis  Dx    Previous rheumatologist: Dr. Silva  Current rheumatologist:   Clinical manifestation: SOB and intermittent chest pain with LE edema, and joint pain, lymphoadnenopathy   BX: 2017 showed noncaseating granulomatosis axillary  Previous treatment: prednisone   Last CT chest showed traction bronchiectasis and linear bands of fibrosis. Subpleural honeycombing b/l  baseline.   Current med: prednisone 10 mg          PAST MEDICAL & SURGICAL HISTORY:  Sarcoidosis      Diabetes      Hypertension      Hyperlipidemia      Chronic kidney disease (CKD), stage III (moderate)      Seizure disorder      No significant past surgical history          Review of Systems:  Gen:  No fevers/chills, weight loss  HEENT: No blurry vision, no difficulty swallowing, no oral or nasal ulcers  CVS: No chest pain/palpitations  Resp: No SOB/wheezing  GI: No N/V/C/D/abdominal pain  MSK:  Skin: No new rashes  Neuro: No headaches    MEDICATIONS  (STANDING):  amLODIPine   Tablet 10 milliGRAM(s) Oral daily  chlorhexidine 2% Cloths 1 Application(s) Topical daily  dextrose 5%. 1000 milliLiter(s) (100 mL/Hr) IV Continuous <Continuous>  dextrose 5%. 1000 milliLiter(s) (50 mL/Hr) IV Continuous <Continuous>  dextrose 50% Injectable 25 Gram(s) IV Push once  dextrose 50% Injectable 12.5 Gram(s) IV Push once  dextrose 50% Injectable 25 Gram(s) IV Push once  glucagon  Injectable 1 milliGRAM(s) IntraMuscular once  heparin   Injectable 5000 Unit(s) SubCutaneous every 8 hours  hydrALAZINE 100 milliGRAM(s) Oral three times a day  influenza   Vaccine 0.5 milliLiter(s) IntraMuscular once  insulin glargine Injectable (LANTUS) 36 Unit(s) SubCutaneous at bedtime  insulin lispro (ADMELOG) corrective regimen sliding scale   SubCutaneous three times a day before meals  insulin lispro (ADMELOG) corrective regimen sliding scale   SubCutaneous at bedtime  insulin lispro Injectable (ADMELOG) 9 Unit(s) SubCutaneous three times a day before meals  lidocaine   4% Patch 1 Patch Transdermal every 24 hours  pantoprazole    Tablet 40 milliGRAM(s) Oral before breakfast  predniSONE   Tablet 10 milliGRAM(s) Oral daily  sodium chloride 0.9%. 1000 milliLiter(s) (80 mL/Hr) IV Continuous <Continuous>    MEDICATIONS  (PRN):  acetaminophen     Tablet .. 650 milliGRAM(s) Oral every 6 hours PRN Temp greater or equal to 38C (100.4F), Mild Pain (1 - 3)  albuterol    90 MICROgram(s) HFA Inhaler 2 Puff(s) Inhalation every 6 hours PRN Shortness of Breath and/or Wheezing  dextrose Oral Gel 15 Gram(s) Oral once PRN Blood Glucose LESS THAN 70 milliGRAM(s)/deciliter  HYDROmorphone   Tablet 2 milliGRAM(s) Oral every 4 hours PRN Severe Pain (7 - 10)  ondansetron Injectable 4 milliGRAM(s) IV Push every 8 hours PRN Nausea and/or Vomiting      Allergies    No Known Allergies    Intolerances        PERTINENT MEDICATION HISTORY:    SOCIAL HISTORY:  OCCUPATION:  TRAVEL HISTORY:    FAMILY HISTORY:  Family history of diabetes mellitus in first degree relative        Vital Signs Last 24 Hrs  T(C): 36.9 (2023 14:06), Max: 36.9 (2023 05:22)  T(F): 98.5 (2023 14:06), Max: 98.5 (2023 05:22)  HR: 96 (2023 14:06) (90 - 109)  BP: 155/82 (2023 14:06) (137/95 - 179/96)  BP(mean): --  RR: 18 (2023 14:06) (18 - 18)  SpO2: 100% (2023 14:06) (99% - 100%)    Parameters below as of 2023 14:06  Patient On (Oxygen Delivery Method): room air        Physical Exam:  General: No apparent distress  HEENT: EOMI, MMM  CVS: +S1/S2, RRR, no murmurs/rubs/gallops  Resp: CTA b/l. No crackles/wheezing  GI: Soft, NT/ND +BS  MSK:  Neuro: AAOx3  Skin: no visible rashes    LABS:                        9.6    6.50  )-----------( 296      ( 2023 06:44 )             29.1         141  |  110<H>  |  50<H>  ----------------------------<  102<H>  4.3   |  19<L>  |  4.60<H>    Ca    8.9      2023 06:38  Phos  4.5       Mg     1.9         TPro  6.1  /  Alb  3.1<L>  /  TBili  0.1<L>  /  DBili  x   /  AST  13  /  ALT  9<L>  /  AlkPhos  44        Urinalysis Basic - ( 2023 00:42 )    Color: Light Yellow / Appearance: Clear / S.016 / pH: x  Gluc: x / Ketone: Negative  / Bili: Negative / Urobili: Negative   Blood: x / Protein: >600 / Nitrite: Negative   Leuk Esterase: Negative / RBC: 11 /hpf / WBC 2 /HPF   Sq Epi: x / Non Sq Epi: 2 /hpf / Bacteria: Negative        RADIOLOGY & ADDITIONAL STUDIES:     LEONEL SIRIA  [id number]    HISTORY OF PRESENT ILLNESS:  52-year-old F w/hx of sarcoidosis DM2 on insulin, seizure, CKD stage IV presented with presented with R knee pain started over a month after stopping her gabapentin. Rheumatologist was consulted for further evaluation.     On admission, her BP was 220/120. Labs were remarkable for glucose 331, BUN 55, creatinine 4.9, Ca++ 9.5, UA showed protein 600 mg/dl. XR hip/tib/fib were all negative.     Hospital summary:  Patient was treated for her hyperglycemia. The CT head and spine were negative. The R knee XR was also negative.     Consult day:   23  Patient was seen at the bedside. Patient stated that her pain improved significantly after starting pain medication in the hospital. Patient stated that the pain is located in the R leg and described as sharp burning sensation. She stated that the pain started 2 month ago after stopping her gabapentin.     ROS:  Positive: R burning sensation on the leg, foamy urine  Negative: fevers, chills, night sweats, eye pain, eye redness, vision changes, photosensitivity, abdominal pain, n/v/d/c, foamy urine, visual change, joint swelling     #Sarcoidosis  Dx    Previous rheumatologist: Dr. Silva  Not active follow up with rheumatologist   Current rheumatologist:   Clinical manifestation: SOB and intermittent chest pain with LE edema, and joint pain, lymphoadenopathy   BX: 2017 showed noncaseating granulomatosis axillary  Previous treatment: prednisone   Last CT chest showed traction bronchiectasis and linear bands of fibrosis. Subpleural honeycombing b/l  baseline.   Current med: prednisone 10 mg per pulmonologist           PAST MEDICAL & SURGICAL HISTORY:  Sarcoidosis      Diabetes      Hypertension      Hyperlipidemia      Chronic kidney disease (CKD), stage III (moderate)      Seizure disorder      No significant past surgical history          Review of Systems:  See H&P    MEDICATIONS  (STANDING):  amLODIPine   Tablet 10 milliGRAM(s) Oral daily  chlorhexidine 2% Cloths 1 Application(s) Topical daily  dextrose 5%. 1000 milliLiter(s) (100 mL/Hr) IV Continuous <Continuous>  dextrose 5%. 1000 milliLiter(s) (50 mL/Hr) IV Continuous <Continuous>  dextrose 50% Injectable 25 Gram(s) IV Push once  dextrose 50% Injectable 12.5 Gram(s) IV Push once  dextrose 50% Injectable 25 Gram(s) IV Push once  glucagon  Injectable 1 milliGRAM(s) IntraMuscular once  heparin   Injectable 5000 Unit(s) SubCutaneous every 8 hours  hydrALAZINE 100 milliGRAM(s) Oral three times a day  influenza   Vaccine 0.5 milliLiter(s) IntraMuscular once  insulin glargine Injectable (LANTUS) 36 Unit(s) SubCutaneous at bedtime  insulin lispro (ADMELOG) corrective regimen sliding scale   SubCutaneous three times a day before meals  insulin lispro (ADMELOG) corrective regimen sliding scale   SubCutaneous at bedtime  insulin lispro Injectable (ADMELOG) 9 Unit(s) SubCutaneous three times a day before meals  lidocaine   4% Patch 1 Patch Transdermal every 24 hours  pantoprazole    Tablet 40 milliGRAM(s) Oral before breakfast  predniSONE   Tablet 10 milliGRAM(s) Oral daily  sodium chloride 0.9%. 1000 milliLiter(s) (80 mL/Hr) IV Continuous <Continuous>    MEDICATIONS  (PRN):  acetaminophen     Tablet .. 650 milliGRAM(s) Oral every 6 hours PRN Temp greater or equal to 38C (100.4F), Mild Pain (1 - 3)  albuterol    90 MICROgram(s) HFA Inhaler 2 Puff(s) Inhalation every 6 hours PRN Shortness of Breath and/or Wheezing  dextrose Oral Gel 15 Gram(s) Oral once PRN Blood Glucose LESS THAN 70 milliGRAM(s)/deciliter  HYDROmorphone   Tablet 2 milliGRAM(s) Oral every 4 hours PRN Severe Pain (7 - 10)  ondansetron Injectable 4 milliGRAM(s) IV Push every 8 hours PRN Nausea and/or Vomiting      Allergies    No Known Allergies    Intolerances        PERTINENT MEDICATION HISTORY:    SOCIAL HISTORY:  OCCUPATION:  TRAVEL HISTORY:    FAMILY HISTORY:  Family history of diabetes mellitus in first degree relative        Vital Signs Last 24 Hrs  T(C): 36.9 (2023 14:06), Max: 36.9 (2023 05:22)  T(F): 98.5 (2023 14:06), Max: 98.5 (2023 05:22)  HR: 96 (2023 14:06) (90 - 109)  BP: 155/82 (2023 14:06) (137/95 - 179/96)  BP(mean): --  RR: 18 (2023 14:06) (18 - 18)  SpO2: 100% (2023 14:06) (99% - 100%)    Parameters below as of 2023 14:06  Patient On (Oxygen Delivery Method): room air        Physical Exam:  General: No apparent distress  HEENT: EOMI, MMM  CVS: +S1/S2, RRR, no murmurs/rubs/gallops  Resp: CTA b/l. No crackles/wheezing  GI: Soft, NT/ND +BS  MSK: no sign of synovitis w/good ROM  Neuro: AAOx3, R leg S1, L5 dermatoma neuropathy   Skin: no visible rashes    LABS:                        9.6    6.50  )-----------( 296      ( 2023 06:44 )             29.1         141  |  110<H>  |  50<H>  ----------------------------<  102<H>  4.3   |  19<L>  |  4.60<H>    Ca    8.9      2023 06:38  Phos  4.5       Mg     1.9         TPro  6.1  /  Alb  3.1<L>  /  TBili  0.1<L>  /  DBili  x   /  AST  13  /  ALT  9<L>  /  AlkPhos  44        Urinalysis Basic - ( 2023 00:42 )    Color: Light Yellow / Appearance: Clear / S.016 / pH: x  Gluc: x / Ketone: Negative  / Bili: Negative / Urobili: Negative   Blood: x / Protein: >600 / Nitrite: Negative   Leuk Esterase: Negative / RBC: 11 /hpf / WBC 2 /HPF   Sq Epi: x / Non Sq Epi: 2 /hpf / Bacteria: Negative  Protein/Creatinine Ratio Calculation: 22.6 Ratio (23 @ 13:10)       RADIOLOGY & ADDITIONAL STUDIES:  < from: Xray Tibia + Fibula 2 Views, Right (23 @ 20:27) >    Right knee: No acute displaced fracture or dislocation.No joint effusion.    Right tibia/fibula: No acute displaced fracture.    < end of copied text >  < from: Xray Knee 3 Views, Right (23 @ 20:27) >  Right knee: No acute displaced fracture or dislocation.No joint effusion.    Right tibia/fibula: No acute displaced fracture.    < end of copied text >  < from: CT Head No Cont (23 @ 00:04) >  Head CT: No evidence for intracranial hemorrhage, mass effect, or   displaced calvarial fracture.    Cervical spine CT: No evidence for acute displaced fracture or traumatic   malalignment.    < end of copied text >  < from: US Kidney and Bladder (23 @ 10:48) >  Increased echogenicity bilaterally, suggestive of underlying renal   parenchymal disease.    < end of copied text >     LEONEL SIRIA  [id number]    HISTORY OF PRESENT ILLNESS:  52-year-old F w/hx of sarcoidosis, DM2 on insulin, seizure, CKD stage IV presented with R knee pain started over a month after stopping her gabapentin. Rheumatologist was consulted for further evaluation.     On admission, her BP was 220/120. Labs were remarkable for glucose 331, BUN 55, creatinine 4.9, Ca++ 9.5, UA showed protein 600 mg/dl. XR hip/tib/fib were all negative.     Hospital summary:  Patient was treated for her hyperglycemia. The CT head and spine were negative. The R knee XR was also negative.     Consult day:   23  Patient was seen at the bedside. Patient stated that her pain improved significantly after starting pain medication in the hospital. Patient stated that the pain is located in the R leg and described as sharp burning sensation. She stated that the pain started 2 months ago after stopping her gabapentin.     ROS:  Positive: R burning sensation on the leg, foamy urine  Negative: fevers, chills, night sweats, eye pain, eye redness, vision changes, photosensitivity, abdominal pain, n/v/d/c, foamy urine, visual change, joint swelling     #Sarcoidosis  Dx    Previous rheumatologist: Dr. Silva  Not active follow up with rheumatologist   Clinical manifestation: SOB and intermittent chest pain with LE edema, and joint pain, lymphoadenopathy   BX: 2017 showed noncaseating granulomatosis axillary   treatment: prednisone   Last CT chest showed traction bronchiectasis and linear bands of fibrosis. Subpleural honeycombing b/l  baseline.   Current med: prednisone 10 mg per pulmonologist           PAST MEDICAL & SURGICAL HISTORY:  Sarcoidosis      Diabetes      Hypertension      Hyperlipidemia      Chronic kidney disease (CKD), stage III (moderate)      Seizure disorder      No significant past surgical history          Review of Systems:  See H&P    MEDICATIONS  (STANDING):  amLODIPine   Tablet 10 milliGRAM(s) Oral daily  chlorhexidine 2% Cloths 1 Application(s) Topical daily  dextrose 5%. 1000 milliLiter(s) (100 mL/Hr) IV Continuous <Continuous>  dextrose 5%. 1000 milliLiter(s) (50 mL/Hr) IV Continuous <Continuous>  dextrose 50% Injectable 25 Gram(s) IV Push once  dextrose 50% Injectable 12.5 Gram(s) IV Push once  dextrose 50% Injectable 25 Gram(s) IV Push once  glucagon  Injectable 1 milliGRAM(s) IntraMuscular once  heparin   Injectable 5000 Unit(s) SubCutaneous every 8 hours  hydrALAZINE 100 milliGRAM(s) Oral three times a day  influenza   Vaccine 0.5 milliLiter(s) IntraMuscular once  insulin glargine Injectable (LANTUS) 36 Unit(s) SubCutaneous at bedtime  insulin lispro (ADMELOG) corrective regimen sliding scale   SubCutaneous three times a day before meals  insulin lispro (ADMELOG) corrective regimen sliding scale   SubCutaneous at bedtime  insulin lispro Injectable (ADMELOG) 9 Unit(s) SubCutaneous three times a day before meals  lidocaine   4% Patch 1 Patch Transdermal every 24 hours  pantoprazole    Tablet 40 milliGRAM(s) Oral before breakfast  predniSONE   Tablet 10 milliGRAM(s) Oral daily  sodium chloride 0.9%. 1000 milliLiter(s) (80 mL/Hr) IV Continuous <Continuous>    MEDICATIONS  (PRN):  acetaminophen     Tablet .. 650 milliGRAM(s) Oral every 6 hours PRN Temp greater or equal to 38C (100.4F), Mild Pain (1 - 3)  albuterol    90 MICROgram(s) HFA Inhaler 2 Puff(s) Inhalation every 6 hours PRN Shortness of Breath and/or Wheezing  dextrose Oral Gel 15 Gram(s) Oral once PRN Blood Glucose LESS THAN 70 milliGRAM(s)/deciliter  HYDROmorphone   Tablet 2 milliGRAM(s) Oral every 4 hours PRN Severe Pain (7 - 10)  ondansetron Injectable 4 milliGRAM(s) IV Push every 8 hours PRN Nausea and/or Vomiting      Allergies    No Known Allergies    Intolerances      FAMILY HISTORY:  Family history of diabetes mellitus in first degree relative        Vital Signs Last 24 Hrs  T(C): 36.9 (2023 14:06), Max: 36.9 (2023 05:22)  T(F): 98.5 (2023 14:06), Max: 98.5 (2023 05:22)  HR: 96 (2023 14:06) (90 - 109)  BP: 155/82 (2023 14:06) (137/95 - 179/96)  BP(mean): --  RR: 18 (2023 14:06) (18 - 18)  SpO2: 100% (2023 14:06) (99% - 100%)    Parameters below as of 2023 14:06  Patient On (Oxygen Delivery Method): room air        Physical Exam:  General: No apparent distress  HEENT: EOMI, MMM  CVS: +S1/S2  Resp: CTA b/l  GI: Soft,   MSK: no sign of synovitis w/good ROM  Neuro: AAOx3, R leg S1, L5 dermatoma neuropathy   Skin: no visible rashes    LABS:                        9.6    6.50  )-----------( 296      ( 2023 06:44 )             29.1         141  |  110<H>  |  50<H>  ----------------------------<  102<H>  4.3   |  19<L>  |  4.60<H>    Ca    8.9      2023 06:38  Phos  4.5       Mg     1.9         TPro  6.1  /  Alb  3.1<L>  /  TBili  0.1<L>  /  DBili  x   /  AST  13  /  ALT  9<L>  /  AlkPhos  44        Urinalysis Basic - ( 2023 00:42 )    Color: Light Yellow / Appearance: Clear / S.016 / pH: x  Gluc: x / Ketone: Negative  / Bili: Negative / Urobili: Negative   Blood: x / Protein: >600 / Nitrite: Negative   Leuk Esterase: Negative / RBC: 11 /hpf / WBC 2 /HPF   Sq Epi: x / Non Sq Epi: 2 /hpf / Bacteria: Negative  Protein/Creatinine Ratio Calculation: 22.6 Ratio (23 @ 13:10)       RADIOLOGY & ADDITIONAL STUDIES:  < from: Xray Tibia + Fibula 2 Views, Right (23 @ 20:27) >    Right knee: No acute displaced fracture or dislocation.No joint effusion.    Right tibia/fibula: No acute displaced fracture.    < end of copied text >  < from: Xray Knee 3 Views, Right (23 @ 20:27) >  Right knee: No acute displaced fracture or dislocation.No joint effusion.    Right tibia/fibula: No acute displaced fracture.    < end of copied text >  < from: CT Head No Cont (23 @ 00:04) >  Head CT: No evidence for intracranial hemorrhage, mass effect, or   displaced calvarial fracture.    Cervical spine CT: No evidence for acute displaced fracture or traumatic   malalignment.    < end of copied text >  < from: US Kidney and Bladder (23 @ 10:48) >  Increased echogenicity bilaterally, suggestive of underlying renal   parenchymal disease.    < end of copied text >

## 2023-01-27 NOTE — CONSULT NOTE ADULT - ASSESSMENT
52-year-old F w/hx of sarcoidosis DM2 on insulin, seizure, CKD stage IV presented with presented with R knee pain started over a month after stopping her gabapentin. Rheumatologist was consulted for further evaluation.  52-year-old F w/hx of sarcoidosis DM2 on insulin, seizure, CKD stage IV presented with presented with R knee pain started over a month after stopping her gabapentin. Rheumatologist was consulted for further evaluation.       #R L neuropathy   -Patient stated that she had this neuropathy for 1-2 months since she stopped taking the gabapentin per Nephrologist recommendation   -No active arthritis   -Risk factors: DM2 uncontrol w/last A1C 14   -Patient stated that the pain improved with pain medications   -Likely diabetic neuropathy, unlikely peripheral neurosarcoidosis since patient does have foot drop and is unilateral     #Sarcoidosis   Dx 2010   Previous rheumatologist: Dr. Silva  Not active follow up with rheumatologist   Current rheumatologist:   Clinical manifestation: SOB and intermittent chest pain with LE edema, and joint pain, lymphoadenopathy   BX: 2017 showed noncaseating granulomatosis axillary  2017 Last CT chest showed traction bronchiectasis and linear bands of fibrosis. Subpleural honeycombing b/l  baseline.   Current med: prednisone 10 mg per pulmonologist     #DM2  Uncontrol DM2 w/A1C 14 last visit      #MIKALA on CKD  Last creatinine 1.5 on 1/15 baseline admitted w/~ creatinine 4   P/Cr 22.6   Per nephrologist likely DM2 nephropathy      Plan  Peripheral neuropathy consistent w/uncontrol DM2. Unlikely sarcoidosis and no role to check biomarker  Patient will need to start tapering down the steroid as outpatient and start on sparing dose agent   Nephrology on board w/current CKD  Patient can follow w/us as outpatient w/Dr. Geronimo Lopez     We will sign off, call us for any question     DW Dr. Geronimo Del Angel MD  PGY-4 Rheumatology Fellow  Pager: [phone]   Available in teams           52-year-old F w/hx of sarcoidosis DM2 on insulin, seizure, CKD stage IV presented with R knee and leg pain started over a month after stopping her gabapentin. Rheumatologist was consulted for further evaluation.       #R L neuropathy   -Patient stated that she had this neuropathy for 1-2 months since she stopped taking the gabapentin per Nephrologist's recommendation   -No active arthritis   -Risk factors: DM2 uncontrol w/last A1C 14   -Patient stated that the pain improved with pain medications   -Likely diabetic neuropathy, unlikely peripheral neurosarcoidosis since patient does not have foot drop and is unilateral     #Sarcoidosis   Dx 2010   Previous rheumatologist: Dr. Silva  Not active follow up with rheumatologist   Clinical manifestation: SOB and intermittent chest pain with LE edema, and joint pain, lymphoadenopathy   BX: 2017 showed noncaseating granulomatosis axillary  2017 Last CT chest showed traction bronchiectasis and linear bands of fibrosis. Subpleural honeycombing b/l  baseline.   Current med: prednisone 10 mg per pulmonologist     #DM2  Uncontrol DM2 w/A1C 14 last visit      #MIKALA on CKD  Last creatinine 1.5 on 1/15 baseline admitted w/~ creatinine 4   P/Cr 22.6   Per nephrologist likely DM2 nephropathy      Plan  Peripheral neuropathy consistent w/uncontrolled DM2. Unlikely related to sarcoidosis  Patient should follow up with treating pulmonologist to discuss tapering of prednisone as outpatient given poorly controlled DM, would benefit from steroid sparing agent   Nephrology on board w/current CKD  Patient can follow w/us as outpatient w/Dr. Geronimo Del Angel MD  PGY-4 Rheumatology Fellow  Pager: [phone]   Available in teams

## 2023-01-27 NOTE — PROGRESS NOTE ADULT - ATTENDING COMMENTS
53 yo f w pmh dm, htn, hld, ckd, sarcoidosis, chronic pain, p/w myalgias + arthralgias, found to have karla on ckd vs progressing ckd + uncontrolled dm + uncontrolled bp, admitted to medicine for further mgmt    of note, known to be nonadherent/noncompliant with medications and follow up visits.    karla on ckd  unclear baseline cr; 7/2022, cr 2.91 -> 9/2022, cr 3.36 ->11/2022, cr 2.94 -> 1/11/2023, cr 4.19 ->1/25/23, cr 4.9  per outpatient nephrology documentation; ckd deemed to be 2/2 diabetic nephropathy  s/p 2 L NS in ER  Monitor strict I/Os, daily weights, UO, BUN/Cr, volume status, acid-base balance, electrolytes   renal/bladder US done showing parenchymal disease    avoid nephrotoxic agents (including home acei/arb + diuretics); appropriate dose adjustments for all renally cleared medications   can consider nephro consult if no improvement noted to kidney function     poorly controlled dm  f/u a1c  monitor fingersticks tidac + hs  carb consistent diet  hold home antidm meds, start home dose basal glargine + low dose correctional scale lispro tidac + hs; adjust to maintain goal bg 140-180 mg/dl  - blood sugars currently improving     poorly controlled htn  replace home nephrotoxic antihtn with hydralazine; continues to be elevated; will increase dosing of hydralazine   - can also consider adding isodril vs imdur  adjust antihtn regimen to maintain goal bp <150/90 mmhg    PT eval: home PT      Rheum consult for further evaluation for symmetric polyarthropathy     Rest as above. Discussed with HS. 53 yo f w pmh dm, htn, hld, ckd, sarcoidosis, chronic pain, p/w myalgias + arthralgias, found to have karla on ckd vs progressing ckd + uncontrolled dm + uncontrolled bp, admitted to medicine for further mgmt    of note, known to be nonadherent/noncompliant with medications and follow up visits.    karla on ckd  unclear baseline cr; 7/2022, cr 2.91 -> 9/2022, cr 3.36 ->11/2022, cr 2.94 -> 1/11/2023, cr 4.19 ->1/25/23, cr 4.9  per outpatient nephrology documentation; ckd deemed to be 2/2 diabetic nephropathy  s/p 2 L NS in ER  Monitor strict I/Os, daily weights, UO, BUN/Cr, volume status, acid-base balance, electrolytes   renal/bladder US done showing parenchymal disease    avoid nephrotoxic agents (including home acei/arb + diuretics); appropriate dose adjustments for all renally cleared medications   can consider nephro consult if no improvement noted to kidney function   - creatinine slightly improved to 4.6 this morning     poorly controlled dm  f/u a1c  monitor fingersticks tidac + hs  carb consistent diet  hold home antidm meds, start home dose basal glargine + low dose correctional scale lispro tidac + hs; adjust to maintain goal bg 140-180 mg/dl  - blood sugars currently improving     poorly controlled htn  replace home nephrotoxic antihtn with hydralazine; continues to be elevated; will increase dosing of hydralazine   - can also consider adding isodril vs imdur  adjust antihtn regimen to maintain goal bp <150/90 mmhg    PT eval: home PT      Rheum consult for further evaluation for symmetric polyarthropathy     Rest as above. Discussed with HS.

## 2023-01-27 NOTE — PROGRESS NOTE ADULT - SUBJECTIVE AND OBJECTIVE BOX
Andres Peterson  PGY-1 Resident Physician     Patient is a 52y old  Female who presents with a chief complaint of Per chart, "52F with sarcoidosis, T2DM, and CKD who presents c/o b/l MSK pains x 1.5 mo and n/v x 1 week. She states that the pains are constant. Denies noticing any erythema, swelling or rash. Pain involves R knee down the shin and L hip. States that she uses tylenol, tramadol and diclofenac gel for the pain. States that she has had poor PO intake over the past week and has felt increasingly nauseous, seemingly exacerbated by tramadol, over the past few days. Unable to take BP pills over the past two days because of nausea. Still having BM and passing."     (2023 16:29)      SUBJECTIVE / OVERNIGHT EVENTS:    MEDICATIONS  (STANDING):  amLODIPine   Tablet 10 milliGRAM(s) Oral daily  chlorhexidine 2% Cloths 1 Application(s) Topical daily  dextrose 5%. 1000 milliLiter(s) (100 mL/Hr) IV Continuous <Continuous>  dextrose 5%. 1000 milliLiter(s) (50 mL/Hr) IV Continuous <Continuous>  dextrose 50% Injectable 25 Gram(s) IV Push once  dextrose 50% Injectable 12.5 Gram(s) IV Push once  dextrose 50% Injectable 25 Gram(s) IV Push once  glucagon  Injectable 1 milliGRAM(s) IntraMuscular once  heparin   Injectable 5000 Unit(s) SubCutaneous every 8 hours  hydrALAZINE 100 milliGRAM(s) Oral three times a day  influenza   Vaccine 0.5 milliLiter(s) IntraMuscular once  insulin glargine Injectable (LANTUS) 36 Unit(s) SubCutaneous at bedtime  insulin lispro (ADMELOG) corrective regimen sliding scale   SubCutaneous three times a day before meals  insulin lispro (ADMELOG) corrective regimen sliding scale   SubCutaneous at bedtime  insulin lispro Injectable (ADMELOG) 9 Unit(s) SubCutaneous three times a day before meals  lidocaine   4% Patch 1 Patch Transdermal every 24 hours  pantoprazole    Tablet 40 milliGRAM(s) Oral before breakfast  predniSONE   Tablet 10 milliGRAM(s) Oral daily  sodium chloride 0.9%. 1000 milliLiter(s) (80 mL/Hr) IV Continuous <Continuous>    MEDICATIONS  (PRN):  acetaminophen     Tablet .. 650 milliGRAM(s) Oral every 6 hours PRN Temp greater or equal to 38C (100.4F), Mild Pain (1 - 3)  albuterol    90 MICROgram(s) HFA Inhaler 2 Puff(s) Inhalation every 6 hours PRN Shortness of Breath and/or Wheezing  dextrose Oral Gel 15 Gram(s) Oral once PRN Blood Glucose LESS THAN 70 milliGRAM(s)/deciliter  HYDROmorphone   Tablet 2 milliGRAM(s) Oral every 4 hours PRN Severe Pain (7 - 10)  ondansetron Injectable 4 milliGRAM(s) IV Push every 8 hours PRN Nausea and/or Vomiting    Allergies    No Known Allergies    Intolerances        Vital Signs Last 24 Hrs  T(C): 36.9 (2023 05:22), Max: 36.9 (2023 05:22)  T(F): 98.5 (2023 05:22), Max: 98.5 (2023 05:22)  HR: 90 (2023 06:40) (90 - 109)  BP: 168/82 (2023 06:40) (137/95 - 179/96)  BP(mean): --  RR: 18 (2023 05:22) (18 - 18)  SpO2: 100% (2023 05:22) (99% - 100%)    Parameters below as of 2023 05:22  Patient On (Oxygen Delivery Method): room air      Daily     Daily   I&O's Summary      Physical Exam  Gen: in mild distress from nausea and LE pain  HEENT: atraumatic, normocephalic, EOMI  CV: RRR, no murmurs  Resp: normal breath sounds  GI: soft, nontender, nondistended, BS+  MSK: extremities atraumatic, R knee and lower leg TTP diffusely, L hip TTP, no swelling or erythema noted. No edema.  Skin: warm, dry, no rashes or lesions  Neuro: no focal deficits, sensation grossly intact  Psych: alert and oriented x3, appropriate mood and affect      DIAGNOSTICS:                         9.6    6.50  )-----------( 296      ( 2023 06:44 )             29.1     Hgb Trend: 9.6<--, 10.2<--  -    141  |  110<H>  |  50<H>  ----------------------------<  102<H>  4.3   |  19<L>  |  4.60<H>    Ca    8.9      2023 06:38  Phos  4.5       Mg     1.9         TPro  6.1  /  Alb  3.1<L>  /  TBili  0.1<L>  /  DBili  x   /  AST  13  /  ALT  9<L>  /  AlkPhos  44      CAPILLARY BLOOD GLUCOSE      POCT Blood Glucose.: 219 mg/dL (2023 12:30)  POCT Blood Glucose.: 208 mg/dL (2023 08:41)  POCT Blood Glucose.: 140 mg/dL (2023 22:02)  POCT Blood Glucose.: 211 mg/dL (2023 17:09)  POCT Blood Glucose.: 429 mg/dL (2023 14:04)    Creatinine Trend: 4.60<--, 4.90<--  LIVER FUNCTIONS - ( 2023 06:38 )  Alb: 3.1 g/dL / Pro: 6.1 g/dL / ALK PHOS: 44 U/L / ALT: 9 U/L / AST: 13 U/L / GGT: x             CARDIAC MARKERS ( 2023 19:38 )  x     / x     / 26 U/L / x     / x          Urinalysis Basic - ( 2023 00:42 )    Color: Light Yellow / Appearance: Clear / S.016 / pH: x  Gluc: x / Ketone: Negative  / Bili: Negative / Urobili: Negative   Blood: x / Protein: >600 / Nitrite: Negative   Leuk Esterase: Negative / RBC: 11 /hpf / WBC 2 /HPF   Sq Epi: x / Non Sq Epi: 2 /hpf / Bacteria: Negative

## 2023-01-27 NOTE — PROGRESS NOTE ADULT - PROBLEM SELECTOR PLAN 2
-Home: Basaglar 36u qhs, Jardiance 10, Trulicity qweek  > c/w lantus 36u qhs, adeclaire 9, MDISS  > f/u A1c

## 2023-01-28 LAB
24R-OH-CALCIDIOL SERPL-MCNC: 7.6 NG/ML — LOW (ref 30–80)
A1C WITH ESTIMATED AVERAGE GLUCOSE RESULT: 10.6 % — HIGH (ref 4–5.6)
ALBUMIN SERPL ELPH-MCNC: 3.1 G/DL — LOW (ref 3.3–5)
ALP SERPL-CCNC: 40 U/L — SIGNIFICANT CHANGE UP (ref 40–120)
ALT FLD-CCNC: 7 U/L — LOW (ref 10–45)
ANION GAP SERPL CALC-SCNC: 18 MMOL/L — HIGH (ref 5–17)
AST SERPL-CCNC: 11 U/L — SIGNIFICANT CHANGE UP (ref 10–40)
BILIRUB SERPL-MCNC: 0.1 MG/DL — LOW (ref 0.2–1.2)
BUN SERPL-MCNC: 48 MG/DL — HIGH (ref 7–23)
CALCIUM SERPL-MCNC: 9.3 MG/DL — SIGNIFICANT CHANGE UP (ref 8.4–10.5)
CHLORIDE SERPL-SCNC: 115 MMOL/L — HIGH (ref 96–108)
CK SERPL-CCNC: 25 U/L — SIGNIFICANT CHANGE UP (ref 25–170)
CO2 SERPL-SCNC: 13 MMOL/L — LOW (ref 22–31)
CREAT SERPL-MCNC: 4.89 MG/DL — HIGH (ref 0.5–1.3)
CRP SERPL-MCNC: <3 MG/L — SIGNIFICANT CHANGE UP (ref 0–4)
EGFR: 10 ML/MIN/1.73M2 — LOW
ESTIMATED AVERAGE GLUCOSE: 258 MG/DL — HIGH (ref 68–114)
GLUCOSE BLDC GLUCOMTR-MCNC: 118 MG/DL — HIGH (ref 70–99)
GLUCOSE BLDC GLUCOMTR-MCNC: 144 MG/DL — HIGH (ref 70–99)
GLUCOSE BLDC GLUCOMTR-MCNC: 187 MG/DL — HIGH (ref 70–99)
GLUCOSE BLDC GLUCOMTR-MCNC: 207 MG/DL — HIGH (ref 70–99)
GLUCOSE SERPL-MCNC: 87 MG/DL — SIGNIFICANT CHANGE UP (ref 70–99)
HCT VFR BLD CALC: 29.2 % — LOW (ref 34.5–45)
HGB BLD-MCNC: 9.7 G/DL — LOW (ref 11.5–15.5)
MAGNESIUM SERPL-MCNC: 1.9 MG/DL — SIGNIFICANT CHANGE UP (ref 1.6–2.6)
MCHC RBC-ENTMCNC: 27.6 PG — SIGNIFICANT CHANGE UP (ref 27–34)
MCHC RBC-ENTMCNC: 33.2 GM/DL — SIGNIFICANT CHANGE UP (ref 32–36)
MCV RBC AUTO: 83.2 FL — SIGNIFICANT CHANGE UP (ref 80–100)
NRBC # BLD: 0 /100 WBCS — SIGNIFICANT CHANGE UP (ref 0–0)
PHOSPHATE SERPL-MCNC: 4.2 MG/DL — SIGNIFICANT CHANGE UP (ref 2.5–4.5)
PLATELET # BLD AUTO: 437 K/UL — HIGH (ref 150–400)
POTASSIUM SERPL-MCNC: 4.1 MMOL/L — SIGNIFICANT CHANGE UP (ref 3.5–5.3)
POTASSIUM SERPL-SCNC: 4.1 MMOL/L — SIGNIFICANT CHANGE UP (ref 3.5–5.3)
PROT SERPL-MCNC: 6.3 G/DL — SIGNIFICANT CHANGE UP (ref 6–8.3)
RBC # BLD: 3.51 M/UL — LOW (ref 3.8–5.2)
RBC # FLD: 13 % — SIGNIFICANT CHANGE UP (ref 10.3–14.5)
SODIUM SERPL-SCNC: 146 MMOL/L — HIGH (ref 135–145)
TSH SERPL-MCNC: 1.71 UIU/ML — SIGNIFICANT CHANGE UP (ref 0.27–4.2)
VIT D25+D1,25 OH+D1,25 PNL SERPL-MCNC: 7 PG/ML — LOW (ref 19.9–79.3)
WBC # BLD: 13.82 K/UL — HIGH (ref 3.8–10.5)
WBC # FLD AUTO: 13.82 K/UL — HIGH (ref 3.8–10.5)

## 2023-01-28 PROCEDURE — 99233 SBSQ HOSP IP/OBS HIGH 50: CPT | Mod: GC

## 2023-01-28 RX ORDER — CHOLECALCIFEROL (VITAMIN D3) 125 MCG
50000 CAPSULE ORAL
Refills: 0 | Status: DISCONTINUED | OUTPATIENT
Start: 2023-01-28 | End: 2023-01-28

## 2023-01-28 RX ORDER — INSULIN LISPRO 100/ML
VIAL (ML) SUBCUTANEOUS AT BEDTIME
Refills: 0 | Status: DISCONTINUED | OUTPATIENT
Start: 2023-01-28 | End: 2023-01-31

## 2023-01-28 RX ORDER — INSULIN GLARGINE 100 [IU]/ML
32 INJECTION, SOLUTION SUBCUTANEOUS AT BEDTIME
Refills: 0 | Status: DISCONTINUED | OUTPATIENT
Start: 2023-01-28 | End: 2023-01-31

## 2023-01-28 RX ORDER — SODIUM CHLORIDE 9 MG/ML
1000 INJECTION INTRAMUSCULAR; INTRAVENOUS; SUBCUTANEOUS
Refills: 0 | Status: DISCONTINUED | OUTPATIENT
Start: 2023-01-28 | End: 2023-01-31

## 2023-01-28 RX ORDER — ERGOCALCIFEROL 1.25 MG/1
50000 CAPSULE ORAL
Refills: 0 | Status: DISCONTINUED | OUTPATIENT
Start: 2023-01-28 | End: 2023-01-31

## 2023-01-28 RX ORDER — INSULIN LISPRO 100/ML
12 VIAL (ML) SUBCUTANEOUS
Refills: 0 | Status: DISCONTINUED | OUTPATIENT
Start: 2023-01-28 | End: 2023-01-31

## 2023-01-28 RX ADMIN — LIDOCAINE 1 PATCH: 4 CREAM TOPICAL at 18:05

## 2023-01-28 RX ADMIN — AMLODIPINE BESYLATE 10 MILLIGRAM(S): 2.5 TABLET ORAL at 05:10

## 2023-01-28 RX ADMIN — HEPARIN SODIUM 5000 UNIT(S): 5000 INJECTION INTRAVENOUS; SUBCUTANEOUS at 21:23

## 2023-01-28 RX ADMIN — PANTOPRAZOLE SODIUM 40 MILLIGRAM(S): 20 TABLET, DELAYED RELEASE ORAL at 05:12

## 2023-01-28 RX ADMIN — HEPARIN SODIUM 5000 UNIT(S): 5000 INJECTION INTRAVENOUS; SUBCUTANEOUS at 13:03

## 2023-01-28 RX ADMIN — Medication 12 UNIT(S): at 17:17

## 2023-01-28 RX ADMIN — HYDROMORPHONE HYDROCHLORIDE 2 MILLIGRAM(S): 2 INJECTION INTRAMUSCULAR; INTRAVENOUS; SUBCUTANEOUS at 05:12

## 2023-01-28 RX ADMIN — LIDOCAINE 1 PATCH: 4 CREAM TOPICAL at 05:15

## 2023-01-28 RX ADMIN — INSULIN GLARGINE 32 UNIT(S): 100 INJECTION, SOLUTION SUBCUTANEOUS at 21:45

## 2023-01-28 RX ADMIN — HEPARIN SODIUM 5000 UNIT(S): 5000 INJECTION INTRAVENOUS; SUBCUTANEOUS at 05:11

## 2023-01-28 RX ADMIN — CHLORHEXIDINE GLUCONATE 1 APPLICATION(S): 213 SOLUTION TOPICAL at 11:18

## 2023-01-28 RX ADMIN — Medication 9 UNIT(S): at 08:23

## 2023-01-28 RX ADMIN — Medication 9 UNIT(S): at 12:20

## 2023-01-28 RX ADMIN — Medication 100 MILLIGRAM(S): at 05:11

## 2023-01-28 RX ADMIN — SODIUM CHLORIDE 80 MILLILITER(S): 9 INJECTION INTRAMUSCULAR; INTRAVENOUS; SUBCUTANEOUS at 09:13

## 2023-01-28 RX ADMIN — Medication 100 MILLIGRAM(S): at 13:03

## 2023-01-28 RX ADMIN — LIDOCAINE 1 PATCH: 4 CREAM TOPICAL at 07:37

## 2023-01-28 RX ADMIN — Medication 100 MILLIGRAM(S): at 21:23

## 2023-01-28 RX ADMIN — ONDANSETRON 4 MILLIGRAM(S): 8 TABLET, FILM COATED ORAL at 09:13

## 2023-01-28 RX ADMIN — Medication 10 MILLIGRAM(S): at 05:10

## 2023-01-28 RX ADMIN — ERGOCALCIFEROL 50000 UNIT(S): 1.25 CAPSULE ORAL at 12:19

## 2023-01-28 RX ADMIN — ONDANSETRON 4 MILLIGRAM(S): 8 TABLET, FILM COATED ORAL at 17:36

## 2023-01-28 RX ADMIN — SODIUM CHLORIDE 100 MILLILITER(S): 9 INJECTION INTRAMUSCULAR; INTRAVENOUS; SUBCUTANEOUS at 10:50

## 2023-01-28 RX ADMIN — Medication 4: at 08:23

## 2023-01-28 NOTE — CHART NOTE - NSCHARTNOTEFT_GEN_A_CORE
Consulted Endo for DM management; as consult non-urgent pt will not be seen today. Recs provided by Dr. Mcgraw and followed: decrease basal lantus to 32u given CKD and AM BG 87, increase admelog to 12u before meals, bedtime ISS changed to low dose scale.

## 2023-01-28 NOTE — PROGRESS NOTE ADULT - ATTENDING COMMENTS
53 yo f w pmh dm, htn, hld, ckd, sarcoidosis, chronic pain, p/w myalgias + arthralgias, found to have karla on ckd vs progressing ckd + uncontrolled dm + uncontrolled bp, admitted to medicine for further mgmt    of note, known to be nonadherent/noncompliant with medications and follow up visits.    karla on ckd  unclear baseline cr; 7/2022, cr 2.91 -> 9/2022, cr 3.36 ->11/2022, cr 2.94 -> 1/11/2023, cr 4.19 ->1/25/23, cr 4.9  per outpatient nephrology documentation; ckd deemed to be 2/2 diabetic nephropathy  s/p 2 L NS in ER  Monitor strict I/Os, daily weights, UO, BUN/Cr, volume status, acid-base balance, electrolytes   renal/bladder US done showing parenchymal disease    avoid nephrotoxic agents (including home acei/arb + diuretics); appropriate dose adjustments for all renally cleared medications   can consider nephro consult if no improvement noted to kidney function   - creatinine slightly elevated to 4.89 this morning     poorly controlled dm  a1c noted to be 10.6   monitor fingersticks tidac + hs  carb consistent diet  hold home antidm meds, start home dose basal glargine + low dose correctional scale lispro tidac + hs; adjust to maintain goal bg 140-180 mg/dl  - blood sugars currently improving   - endo consult for further evaluation in setting of elevated A1c; recs noted and changed; f/u official consult note     poorly controlled htn  replace home nephrotoxic antihtn with hydralazine; improving now with higher dose of hydralazine   - can also consider adding isodril vs imdur  adjust antihtn regimen to maintain goal bp <150/90 mmhg    PT eval: home PT    Rheum consult noted; f/u JOSLYN, dsDNA, ACE level; outpatient rheum follow up   Vit d supplementation for low Vit D levels   f/u blood cx     Rest as above. Discussed with HS. 51 yo f w pmh dm, htn, hld, ckd, sarcoidosis, chronic pain, p/w myalgias + arthralgias, found to have karla on ckd vs progressing ckd + uncontrolled dm + uncontrolled bp, admitted to medicine for further mgmt    of note, known to be nonadherent/noncompliant with medications and follow up visits.    karla on ckd  unclear baseline cr; 7/2022, cr 2.91 -> 9/2022, cr 3.36 ->11/2022, cr 2.94 -> 1/11/2023, cr 4.19 ->1/25/23, cr 4.9  per outpatient nephrology documentation; ckd deemed to be 2/2 diabetic nephropathy  s/p 2 L NS in ER  Monitor strict I/Os, daily weights, UO, BUN/Cr, volume status, acid-base balance, electrolytes   renal/bladder US done showing parenchymal disease    avoid nephrotoxic agents (including home acei/arb + diuretics); appropriate dose adjustments for all renally cleared medications   can consider nephro consult if no improvement noted to kidney function   - creatinine slightly elevated to 4.89 this morning; will give some IVF since patient not eating/drinking too well and monitor     poorly controlled dm  a1c noted to be 10.6   monitor fingersticks tidac + hs  carb consistent diet  hold home antidm meds, start home dose basal glargine + low dose correctional scale lispro tidac + hs; adjust to maintain goal bg 140-180 mg/dl  - blood sugars currently improving   - endo consult for further evaluation in setting of elevated A1c; recs noted and changed; f/u official consult note     poorly controlled htn  replace home nephrotoxic antihtn with hydralazine; improving now with higher dose of hydralazine   - can also consider adding isodril vs imdur  adjust antihtn regimen to maintain goal bp <150/90 mmhg    PT eval: home PT    Rheum consult noted; f/u JOSLYN, dsDNA, ACE level; outpatient rheum follow up   Vit d supplementation for low Vit D levels   f/u blood cx     Rest as above. Discussed with HS.

## 2023-01-28 NOTE — PROGRESS NOTE ADULT - SUBJECTIVE AND OBJECTIVE BOX
Meredith Allison | PGY-2  Internal Medicine    OVERNIGHT EVENTS: no overnight events      SUBJECTIVE: Patient was examined at bedside this morning. Appeared comfortable. Overnight vitals and monitoring results were reviewed. Reporting nausea      MEDICATIONS  (STANDING):  amLODIPine   Tablet 10 milliGRAM(s) Oral daily  chlorhexidine 2% Cloths 1 Application(s) Topical daily  dextrose 5%. 1000 milliLiter(s) (100 mL/Hr) IV Continuous <Continuous>  dextrose 5%. 1000 milliLiter(s) (50 mL/Hr) IV Continuous <Continuous>  dextrose 50% Injectable 25 Gram(s) IV Push once  dextrose 50% Injectable 12.5 Gram(s) IV Push once  dextrose 50% Injectable 25 Gram(s) IV Push once  ergocalciferol 35814 Unit(s) Oral <User Schedule>  glucagon  Injectable 1 milliGRAM(s) IntraMuscular once  heparin   Injectable 5000 Unit(s) SubCutaneous every 8 hours  hydrALAZINE 100 milliGRAM(s) Oral three times a day  influenza   Vaccine 0.5 milliLiter(s) IntraMuscular once  insulin glargine Injectable (LANTUS) 36 Unit(s) SubCutaneous at bedtime  insulin lispro (ADMELOG) corrective regimen sliding scale   SubCutaneous three times a day before meals  insulin lispro (ADMELOG) corrective regimen sliding scale   SubCutaneous at bedtime  insulin lispro Injectable (ADMELOG) 9 Unit(s) SubCutaneous three times a day before meals  lidocaine   4% Patch 1 Patch Transdermal every 24 hours  pantoprazole    Tablet 40 milliGRAM(s) Oral before breakfast  predniSONE   Tablet 10 milliGRAM(s) Oral daily  sodium chloride 0.9%. 1000 milliLiter(s) (80 mL/Hr) IV Continuous <Continuous>    MEDICATIONS  (PRN):  acetaminophen     Tablet .. 650 milliGRAM(s) Oral every 6 hours PRN Temp greater or equal to 38C (100.4F), Mild Pain (1 - 3)  albuterol    90 MICROgram(s) HFA Inhaler 2 Puff(s) Inhalation every 6 hours PRN Shortness of Breath and/or Wheezing  dextrose Oral Gel 15 Gram(s) Oral once PRN Blood Glucose LESS THAN 70 milliGRAM(s)/deciliter  HYDROmorphone   Tablet 2 milliGRAM(s) Oral every 4 hours PRN Severe Pain (7 - 10)  ondansetron Injectable 4 milliGRAM(s) IV Push every 8 hours PRN Nausea and/or Vomiting        T(F): 97.3 (01-28-23 @ 05:53), Max: 98.5 (01-27-23 @ 14:06)  HR: 118 (01-28-23 @ 05:53) (96 - 118)  BP: 137/81 (01-28-23 @ 05:53) (137/81 - 168/80)  BP(mean): --  RR: 18 (01-28-23 @ 05:53) (18 - 20)  SpO2: 99% (01-28-23 @ 05:53) (99% - 100%)    PHYSICAL EXAM:     GENERAL: NAD, lying in bed comfortably  HEAD:  Atraumatic, Normocephalic  EYES: EOMI, PERRLA, conjunctiva and sclera clear, no nystagmus noted  CHEST/LUNG: Clear to auscultation bilaterally; No rales, rhonchi, wheezing, or rubs. Unlabored respirations  HEART: Regular rate and rhythm; No murmurs, rubs, or gallops, normal S1/S2  ABDOMEN: normal bowel sounds; Soft, nontender, nondistended, no organomegaly   EXTREMITIES:  2+ Peripheral Pulses, brisk capillary refill. No clubbing, cyanosis, or edema  Neurological:  A&Ox3, no focal deficits     TELEMETRY:    LABS:                        9.7    13.82 )-----------( 437      ( 28 Jan 2023 07:28 )             29.2     01-28    146<H>  |  115<H>  |  48<H>  ----------------------------<  87  4.1   |  13<L>  |  4.89<H>    Ca    9.3      28 Jan 2023 07:29  Phos  4.2     01-28  Mg     1.9     01-28    TPro  6.3  /  Alb  3.1<L>  /  TBili  0.1<L>  /  DBili  x   /  AST  11  /  ALT  7<L>  /  AlkPhos  40  01-28    CARDIAC MARKERS ( 28 Jan 2023 07:29 )  x     / x     / 25 U/L / x     / x              Creatinine Trend: 4.89<--, 4.60<--, 4.90<--  I&O's Summary    27 Jan 2023 07:01  -  28 Jan 2023 07:00  --------------------------------------------------------  IN: 0 mL / OUT: 400 mL / NET: -400 mL      BNP    RADIOLOGY & ADDITIONAL STUDIES:

## 2023-01-28 NOTE — PROGRESS NOTE ADULT - PROBLEM SELECTOR PLAN 2
-Home: Basaglar 36u qhs, Jardiance 10, Trulicity qweek  > c/w lantus 36u qhs, adeclaire 9, MDISS  > f/u A1c -Home: Basaglar 36u qhs, Jardiance 10, Trulicity qweek  > lantus 32u qhs, gricel 12u, MDISS for meals, low dose ISS for bedtime  > f/u A1c  - endo c/s, email sent, refer to chart note

## 2023-01-29 DIAGNOSIS — R73.9 HYPERGLYCEMIA, UNSPECIFIED: ICD-10-CM

## 2023-01-29 DIAGNOSIS — E11.65 TYPE 2 DIABETES MELLITUS WITH HYPERGLYCEMIA: ICD-10-CM

## 2023-01-29 DIAGNOSIS — E78.5 HYPERLIPIDEMIA, UNSPECIFIED: ICD-10-CM

## 2023-01-29 LAB
ALBUMIN SERPL ELPH-MCNC: 3 G/DL — LOW (ref 3.3–5)
ALP SERPL-CCNC: 40 U/L — SIGNIFICANT CHANGE UP (ref 40–120)
ALT FLD-CCNC: 13 U/L — SIGNIFICANT CHANGE UP (ref 10–45)
ANION GAP SERPL CALC-SCNC: 11 MMOL/L — SIGNIFICANT CHANGE UP (ref 5–17)
AST SERPL-CCNC: 21 U/L — SIGNIFICANT CHANGE UP (ref 10–40)
BILIRUB SERPL-MCNC: <0.1 MG/DL — LOW (ref 0.2–1.2)
BUN SERPL-MCNC: 44 MG/DL — HIGH (ref 7–23)
CALCIUM SERPL-MCNC: 8.8 MG/DL — SIGNIFICANT CHANGE UP (ref 8.4–10.5)
CHLORIDE SERPL-SCNC: 112 MMOL/L — HIGH (ref 96–108)
CO2 SERPL-SCNC: 17 MMOL/L — LOW (ref 22–31)
CREAT SERPL-MCNC: 4.56 MG/DL — HIGH (ref 0.5–1.3)
EGFR: 11 ML/MIN/1.73M2 — LOW
GLUCOSE BLDC GLUCOMTR-MCNC: 151 MG/DL — HIGH (ref 70–99)
GLUCOSE BLDC GLUCOMTR-MCNC: 157 MG/DL — HIGH (ref 70–99)
GLUCOSE BLDC GLUCOMTR-MCNC: 169 MG/DL — HIGH (ref 70–99)
GLUCOSE BLDC GLUCOMTR-MCNC: 89 MG/DL — SIGNIFICANT CHANGE UP (ref 70–99)
GLUCOSE SERPL-MCNC: 96 MG/DL — SIGNIFICANT CHANGE UP (ref 70–99)
HCT VFR BLD CALC: 27 % — LOW (ref 34.5–45)
HGB BLD-MCNC: 8.9 G/DL — LOW (ref 11.5–15.5)
MAGNESIUM SERPL-MCNC: 1.8 MG/DL — SIGNIFICANT CHANGE UP (ref 1.6–2.6)
MCHC RBC-ENTMCNC: 28.3 PG — SIGNIFICANT CHANGE UP (ref 27–34)
MCHC RBC-ENTMCNC: 33 GM/DL — SIGNIFICANT CHANGE UP (ref 32–36)
MCV RBC AUTO: 85.7 FL — SIGNIFICANT CHANGE UP (ref 80–100)
NRBC # BLD: 0 /100 WBCS — SIGNIFICANT CHANGE UP (ref 0–0)
PHOSPHATE SERPL-MCNC: 5 MG/DL — HIGH (ref 2.5–4.5)
PLATELET # BLD AUTO: 283 K/UL — SIGNIFICANT CHANGE UP (ref 150–400)
POTASSIUM SERPL-MCNC: 4.6 MMOL/L — SIGNIFICANT CHANGE UP (ref 3.5–5.3)
POTASSIUM SERPL-SCNC: 4.6 MMOL/L — SIGNIFICANT CHANGE UP (ref 3.5–5.3)
PROT SERPL-MCNC: 5.8 G/DL — LOW (ref 6–8.3)
RBC # BLD: 3.15 M/UL — LOW (ref 3.8–5.2)
RBC # FLD: 13.2 % — SIGNIFICANT CHANGE UP (ref 10.3–14.5)
SODIUM SERPL-SCNC: 140 MMOL/L — SIGNIFICANT CHANGE UP (ref 135–145)
WBC # BLD: 6.55 K/UL — SIGNIFICANT CHANGE UP (ref 3.8–10.5)
WBC # FLD AUTO: 6.55 K/UL — SIGNIFICANT CHANGE UP (ref 3.8–10.5)

## 2023-01-29 PROCEDURE — 99222 1ST HOSP IP/OBS MODERATE 55: CPT | Mod: GC

## 2023-01-29 PROCEDURE — 99232 SBSQ HOSP IP/OBS MODERATE 35: CPT

## 2023-01-29 RX ORDER — POLYETHYLENE GLYCOL 3350 17 G/17G
17 POWDER, FOR SOLUTION ORAL
Refills: 0 | Status: DISCONTINUED | OUTPATIENT
Start: 2023-01-29 | End: 2023-01-31

## 2023-01-29 RX ORDER — SENNA PLUS 8.6 MG/1
2 TABLET ORAL AT BEDTIME
Refills: 0 | Status: DISCONTINUED | OUTPATIENT
Start: 2023-01-29 | End: 2023-01-31

## 2023-01-29 RX ADMIN — HEPARIN SODIUM 5000 UNIT(S): 5000 INJECTION INTRAVENOUS; SUBCUTANEOUS at 21:50

## 2023-01-29 RX ADMIN — PANTOPRAZOLE SODIUM 40 MILLIGRAM(S): 20 TABLET, DELAYED RELEASE ORAL at 05:10

## 2023-01-29 RX ADMIN — HEPARIN SODIUM 5000 UNIT(S): 5000 INJECTION INTRAVENOUS; SUBCUTANEOUS at 13:24

## 2023-01-29 RX ADMIN — Medication 100 MILLIGRAM(S): at 21:46

## 2023-01-29 RX ADMIN — AMLODIPINE BESYLATE 10 MILLIGRAM(S): 2.5 TABLET ORAL at 05:11

## 2023-01-29 RX ADMIN — Medication 12 UNIT(S): at 12:23

## 2023-01-29 RX ADMIN — LIDOCAINE 1 PATCH: 4 CREAM TOPICAL at 17:39

## 2023-01-29 RX ADMIN — Medication 2: at 12:22

## 2023-01-29 RX ADMIN — SENNA PLUS 2 TABLET(S): 8.6 TABLET ORAL at 21:46

## 2023-01-29 RX ADMIN — Medication 12 UNIT(S): at 17:34

## 2023-01-29 RX ADMIN — HYDROMORPHONE HYDROCHLORIDE 2 MILLIGRAM(S): 2 INJECTION INTRAMUSCULAR; INTRAVENOUS; SUBCUTANEOUS at 04:47

## 2023-01-29 RX ADMIN — LIDOCAINE 1 PATCH: 4 CREAM TOPICAL at 04:48

## 2023-01-29 RX ADMIN — LIDOCAINE 1 PATCH: 4 CREAM TOPICAL at 07:39

## 2023-01-29 RX ADMIN — Medication 100 MILLIGRAM(S): at 05:11

## 2023-01-29 RX ADMIN — Medication 12 UNIT(S): at 08:35

## 2023-01-29 RX ADMIN — Medication 10 MILLIGRAM(S): at 05:11

## 2023-01-29 RX ADMIN — Medication 2: at 17:34

## 2023-01-29 RX ADMIN — HYDROMORPHONE HYDROCHLORIDE 2 MILLIGRAM(S): 2 INJECTION INTRAMUSCULAR; INTRAVENOUS; SUBCUTANEOUS at 06:26

## 2023-01-29 RX ADMIN — INSULIN GLARGINE 32 UNIT(S): 100 INJECTION, SOLUTION SUBCUTANEOUS at 21:08

## 2023-01-29 RX ADMIN — Medication 100 MILLIGRAM(S): at 17:33

## 2023-01-29 RX ADMIN — Medication 2: at 08:34

## 2023-01-29 RX ADMIN — CHLORHEXIDINE GLUCONATE 1 APPLICATION(S): 213 SOLUTION TOPICAL at 11:12

## 2023-01-29 RX ADMIN — HEPARIN SODIUM 5000 UNIT(S): 5000 INJECTION INTRAVENOUS; SUBCUTANEOUS at 05:11

## 2023-01-29 RX ADMIN — POLYETHYLENE GLYCOL 3350 17 GRAM(S): 17 POWDER, FOR SOLUTION ORAL at 06:16

## 2023-01-29 NOTE — PROGRESS NOTE ADULT - SUBJECTIVE AND OBJECTIVE BOX
Andres Peterson  PGY-1 Resident Physician     Patient is a 52y old Female who presents with a chief complaint of MIKALA, MSK pains (29 Jan 2023 09:04)    SUBJECTIVE / OVERNIGHT EVENTS:  NAEON. Endorsing N/V. Able to produce urine. Remaining ROS (-).     MEDICATIONS  (STANDING):  amLODIPine   Tablet 10 milliGRAM(s) Oral daily  chlorhexidine 2% Cloths 1 Application(s) Topical daily  dextrose 5%. 1000 milliLiter(s) (100 mL/Hr) IV Continuous <Continuous>  dextrose 5%. 1000 milliLiter(s) (50 mL/Hr) IV Continuous <Continuous>  dextrose 50% Injectable 25 Gram(s) IV Push once  dextrose 50% Injectable 12.5 Gram(s) IV Push once  dextrose 50% Injectable 25 Gram(s) IV Push once  ergocalciferol 42573 Unit(s) Oral <User Schedule>  glucagon  Injectable 1 milliGRAM(s) IntraMuscular once  heparin   Injectable 5000 Unit(s) SubCutaneous every 8 hours  hydrALAZINE 100 milliGRAM(s) Oral three times a day  influenza   Vaccine 0.5 milliLiter(s) IntraMuscular once  insulin glargine Injectable (LANTUS) 32 Unit(s) SubCutaneous at bedtime  insulin lispro (ADMELOG) corrective regimen sliding scale   SubCutaneous three times a day before meals  insulin lispro (ADMELOG) corrective regimen sliding scale   SubCutaneous at bedtime  insulin lispro Injectable (ADMELOG) 12 Unit(s) SubCutaneous three times a day before meals  lidocaine   4% Patch 1 Patch Transdermal every 24 hours  pantoprazole    Tablet 40 milliGRAM(s) Oral before breakfast  predniSONE   Tablet 10 milliGRAM(s) Oral daily  senna 2 Tablet(s) Oral at bedtime  sodium chloride 0.9%. 1000 milliLiter(s) (100 mL/Hr) IV Continuous <Continuous>    MEDICATIONS  (PRN):  acetaminophen     Tablet .. 650 milliGRAM(s) Oral every 6 hours PRN Temp greater or equal to 38C (100.4F), Mild Pain (1 - 3)  albuterol    90 MICROgram(s) HFA Inhaler 2 Puff(s) Inhalation every 6 hours PRN Shortness of Breath and/or Wheezing  dextrose Oral Gel 15 Gram(s) Oral once PRN Blood Glucose LESS THAN 70 milliGRAM(s)/deciliter  HYDROmorphone   Tablet 2 milliGRAM(s) Oral every 4 hours PRN Severe Pain (7 - 10)  ondansetron Injectable 4 milliGRAM(s) IV Push every 8 hours PRN Nausea and/or Vomiting  polyethylene glycol 3350 17 Gram(s) Oral two times a day PRN Constipation    Allergies    No Known Allergies    Intolerances        Vital Signs Last 24 Hrs  T(C): 36.9 (29 Jan 2023 04:31), Max: 37 (28 Jan 2023 20:52)  T(F): 98.5 (29 Jan 2023 04:31), Max: 98.6 (28 Jan 2023 20:52)  HR: 108 (29 Jan 2023 06:31) (105 - 114)  BP: 162/95 (29 Jan 2023 06:31) (149/85 - 176/93)  BP(mean): --  RR: 18 (29 Jan 2023 04:31) (18 - 18)  SpO2: 98% (29 Jan 2023 04:31) (98% - 98%)    Parameters below as of 29 Jan 2023 04:31  Patient On (Oxygen Delivery Method): room air      Daily Height in cm: 152.4 (28 Jan 2023 13:11)    Daily   I&O's Summary      Physical Exam  Gen: in mild distress from nausea and LE pain  HEENT: atraumatic, normocephalic, EOMI  CV: RRR, no murmurs  Resp: normal breath sounds  GI: soft, nontender, nondistended, BS+  MSK: extremities atraumatic, R knee and lower leg TTP diffusely, L hip TTP, no swelling or erythema noted. No edema.  Skin: warm, dry, no rashes or lesions  Neuro: no focal deficits, sensation grossly intact  Psych: alert and oriented x3, appropriate mood and affect    DIAGNOSTICS:                         8.9    6.55  )-----------( 283      ( 29 Jan 2023 07:01 )             27.0     Hgb Trend: 8.9<--, 9.7<--, 9.6<--, 10.2<--  01-29    140  |  112<H>  |  44<H>  ----------------------------<  96  4.6   |  17<L>  |  4.56<H>    Ca    8.8      29 Jan 2023 07:01  Phos  5.0     01-29  Mg     1.8     01-29    TPro  5.8<L>  /  Alb  3.0<L>  /  TBili  <0.1<L>  /  DBili  x   /  AST  21  /  ALT  13  /  AlkPhos  40  01-29    CAPILLARY BLOOD GLUCOSE      POCT Blood Glucose.: 151 mg/dL (29 Jan 2023 08:22)  POCT Blood Glucose.: 187 mg/dL (28 Jan 2023 21:08)  POCT Blood Glucose.: 118 mg/dL (28 Jan 2023 17:15)  POCT Blood Glucose.: 144 mg/dL (28 Jan 2023 12:17)    Creatinine Trend: 4.56<--, 4.89<--, 4.60<--, 4.90<--  LIVER FUNCTIONS - ( 29 Jan 2023 07:01 )  Alb: 3.0 g/dL / Pro: 5.8 g/dL / ALK PHOS: 40 U/L / ALT: 13 U/L / AST: 21 U/L / GGT: x             CARDIAC MARKERS ( 28 Jan 2023 07:29 )  x     / x     / 25 U/L / x     / x

## 2023-01-29 NOTE — CONSULT NOTE ADULT - SUBJECTIVE AND OBJECTIVE BOX
ENDOCRINE INITIAL CONSULT - diabetes    HPI:  52F with sarcoidosis, T2DM, and CKD who presents c/o b/l MSK pains x 1.5 mo and n/v x 1 week. She states that the pains are constant. Denies noticing any erythema, swelling or rash. Pain involves R knee down the shin and L hip. States that she uses tylenol, tramadol and diclofenac gel for the pain. States that she has had poor PO intake over the past week and has felt increasingly nauseous, seemingly exacerbated by tramadol, over the past few days. Unable to take BP pills over the past two days because of nausea. Still having BM and passing gas. FS over past 2wks reviewed, running 200-400s. Denies any urinary sx. Of note, SCr was 4.19 1/11 and was advised by nephrology to cut torsemide from 10 to 5, however pt states pharmacy did not get rx so she has continued to take torsemide 10. Pt denies fevers or CP. Endorses chronic dyspnea. In the ED, HR sinus 90-110s and /120s. Labs significant for SCr 4.9 and  mg/dl prot. CTH, c-spine wnl. CXR clear. XR R knee, tib+fib, L hip wnl. S/p 2L IVF and pain medication. Admitted to medicine for further management. (26 Jan 2023 05:21)      ENDOCRINE HISTORY   Diagnosed with DM:   Last HbA1c: 10.6  Endocrinologist:   Fran review of medications:  Home DM Meds: jardiance 10mg daily, trulicity 0.75mg subcutaneous weekly, basaglar 36 units nightly  Adherence:  Microvascular complications: Renal, opthalmologic, neuropathy  Macrovascular complications:  SMBG:  Symptoms:  Hypoglycemia episodes:  BG at home:  Diet at home:  Appetite in hospital:  Exercise:  PMHx:  PSHx:  Family hx:  Social hx:  Insurance:  Resides in:      PAST MEDICAL & SURGICAL HISTORY:  Sarcoidosis      Diabetes      Hypertension      Hyperlipidemia      Chronic kidney disease (CKD), stage III (moderate)      Seizure disorder      No significant past surgical history          FAMILY HISTORY:  Family history of diabetes mellitus in first degree relative        Social History:  Lives with two children  Not working  Denies substance use  Needs assistance to walk (26 Jan 2023 05:21)      Home Medications:  acetaminophen 325 mg oral tablet: 2 tab(s) orally every 6 hours, As needed, Temp greater or equal to 38C (100.4F), Mild Pain (1 - 3), Moderate Pain (4 - 6) (26 Jan 2023 04:46)  albuterol 90 mcg/inh inhalation aerosol: 2 puff(s) inhaled every 4 hours, As Needed (26 Jan 2023 04:46)  amLODIPine 10 mg oral tablet: 1 tab(s) orally once a day (26 Jan 2023 04:46)  Basaglar KwikPen 100 units/mL subcutaneous solution: 36 unit(s) subcutaneous once a day (at bedtime) (26 Jan 2023 04:46)  diclofenac 1% topical gel: Apply topically to affected area 3 times a day, As Needed (26 Jan 2023 04:46)  famotidine 20 mg oral tablet: 1 tab(s) orally once a day (26 Jan 2023 04:46)  fluticasone 50 mcg/inh nasal spray: 1 spray(s) nasal 2 times a day (26 Jan 2023 04:46)  Jardiance 10 mg oral tablet: 1 tab(s) orally once a day (in the morning) (26 Jan 2023 04:46)  losartan 100 mg oral tablet: 1 tab(s) orally once a day (26 Jan 2023 04:46)  predniSONE 10 mg oral tablet: 1 tab(s) orally once a day (26 Jan 2023 04:46)  rosuvastatin 40 mg oral tablet: 1 tab(s) orally once a day (26 Jan 2023 04:46)  torsemide 10 mg oral tablet: 1 tab(s) orally once a day (26 Jan 2023 04:46)  traMADol 50 mg oral tablet: 1 tab(s) orally every 6 hours, As Needed (26 Jan 2023 04:46)  Trulicity Pen 0.75 mg/0.5 mL subcutaneous solution: 0.75 milligram(s) subcutaneous once a week (26 Jan 2023 04:46)      MEDICATIONS  (STANDING):  amLODIPine   Tablet 10 milliGRAM(s) Oral daily  chlorhexidine 2% Cloths 1 Application(s) Topical daily  dextrose 5%. 1000 milliLiter(s) (100 mL/Hr) IV Continuous <Continuous>  dextrose 5%. 1000 milliLiter(s) (50 mL/Hr) IV Continuous <Continuous>  dextrose 50% Injectable 25 Gram(s) IV Push once  dextrose 50% Injectable 12.5 Gram(s) IV Push once  dextrose 50% Injectable 25 Gram(s) IV Push once  ergocalciferol 41947 Unit(s) Oral <User Schedule>  glucagon  Injectable 1 milliGRAM(s) IntraMuscular once  heparin   Injectable 5000 Unit(s) SubCutaneous every 8 hours  hydrALAZINE 100 milliGRAM(s) Oral three times a day  influenza   Vaccine 0.5 milliLiter(s) IntraMuscular once  insulin glargine Injectable (LANTUS) 32 Unit(s) SubCutaneous at bedtime  insulin lispro (ADMELOG) corrective regimen sliding scale   SubCutaneous at bedtime  insulin lispro (ADMELOG) corrective regimen sliding scale   SubCutaneous three times a day before meals  insulin lispro Injectable (ADMELOG) 12 Unit(s) SubCutaneous three times a day before meals  lidocaine   4% Patch 1 Patch Transdermal every 24 hours  pantoprazole    Tablet 40 milliGRAM(s) Oral before breakfast  predniSONE   Tablet 10 milliGRAM(s) Oral daily  senna 2 Tablet(s) Oral at bedtime  sodium chloride 0.9%. 1000 milliLiter(s) (100 mL/Hr) IV Continuous <Continuous>    MEDICATIONS  (PRN):  acetaminophen     Tablet .. 650 milliGRAM(s) Oral every 6 hours PRN Temp greater or equal to 38C (100.4F), Mild Pain (1 - 3)  albuterol    90 MICROgram(s) HFA Inhaler 2 Puff(s) Inhalation every 6 hours PRN Shortness of Breath and/or Wheezing  dextrose Oral Gel 15 Gram(s) Oral once PRN Blood Glucose LESS THAN 70 milliGRAM(s)/deciliter  HYDROmorphone   Tablet 2 milliGRAM(s) Oral every 4 hours PRN Severe Pain (7 - 10)  ondansetron Injectable 4 milliGRAM(s) IV Push every 8 hours PRN Nausea and/or Vomiting  polyethylene glycol 3350 17 Gram(s) Oral two times a day PRN Constipation      Allergies    No Known Allergies    Intolerances        REVIEW OF SYSTEMS  Constitutional: No fever  Eyes: No blurry vision  Neuro: No tremors  HEENT: No pain  Cardiovascular: No chest pain, palpitations  Respiratory: No SOB, no cough  GI: No nausea, vomiting, abdominal pain  : No dysuria  Skin: no rash  Psych: no depression  Endocrine: no polyuria, polydipsia  Hem/lymph: no swelling  Osteoporosis: no fractures  ALL OTHER SYSTEMS REVIEWED AND NEGATIVE     UNABLE TO OBTAIN     PHYSICAL EXAM   Vital Signs Last 24 Hrs  T(C): 36.9 (29 Jan 2023 04:31), Max: 37 (28 Jan 2023 20:52)  T(F): 98.5 (29 Jan 2023 04:31), Max: 98.6 (28 Jan 2023 20:52)  HR: 108 (29 Jan 2023 06:31) (105 - 114)  BP: 162/95 (29 Jan 2023 06:31) (149/85 - 176/93)  BP(mean): --  RR: 18 (29 Jan 2023 04:31) (18 - 18)  SpO2: 98% (29 Jan 2023 04:31) (98% - 98%)    Parameters below as of 29 Jan 2023 04:31  Patient On (Oxygen Delivery Method): room air      GENERAL: NAD, well-groomed, well-developed  EYES: No proptosis, no lid lag, anicteric  HEENT:  Atraumatic, Normocephalic, moist mucous membranes  THYROID: Normal size, no palpable nodules  RESPIRATORY: Clear to auscultation bilaterally; No rales, rhonchi, wheezing  CARDIOVASCULAR: Regular rate and rhythm; No murmurs; no peripheral edema  GI: Soft, nontender, non distended, normal bowel sounds  SKIN: Dry, intact, No rashes or lesions  MUSCULOSKELETAL: Full range of motion, normal strength  NEURO: sensation intact, extraocular movements intact, no tremor  PSYCH: Alert and oriented x 3, normal affect, normal mood  CUSHING'S SIGNS: no striae    CAPILLARY BLOOD GLUCOSE      POCT Blood Glucose.: 151 mg/dL (29 Jan 2023 08:22)  POCT Blood Glucose.: 187 mg/dL (28 Jan 2023 21:08)  POCT Blood Glucose.: 118 mg/dL (28 Jan 2023 17:15)  POCT Blood Glucose.: 144 mg/dL (28 Jan 2023 12:17)      A1C with Estimated Average Glucose Result: 10.6 % (01-27-23 @ 18:12)  A1C with Estimated Average Glucose Result: 10.6 % (01-27-23 @ 06:44)                            8.9    6.55  )-----------( 283      ( 29 Jan 2023 07:01 )             27.0       01-29    140  |  112<H>  |  44<H>  ----------------------------<  96  4.6   |  17<L>  |  4.56<H>    Ca    8.8      29 Jan 2023 07:01  Phos  5.0     01-29  Mg     1.8     01-29    TPro  5.8<L>  /  Alb  3.0<L>  /  TBili  <0.1<L>  /  DBili  x   /  AST  21  /  ALT  13  /  AlkPhos  40  01-29      Thyroid Stimulating Hormone, Serum: 1.71 uIU/mL (01-28-23 @ 07:29)      LIPIDS    RADIOLOGY ENDOCRINE INITIAL CONSULT - diabetes    HPI:  52F with sarcoidosis, T2DM, and CKD who presents c/o b/l MSK pains x 1.5 mo and n/v x 1 week. She states that the pains are constant. Denies noticing any erythema, swelling or rash. Pain involves R knee down the shin and L hip. States that she uses tylenol, tramadol and diclofenac gel for the pain. States that she has had poor PO intake over the past week and has felt increasingly nauseous, seemingly exacerbated by tramadol, over the past few days. Unable to take BP pills over the past two days because of nausea. Still having BM and passing gas. FS over past 2wks reviewed, running 200-400s. Denies any urinary sx. Of note, SCr was 4.19 1/11 and was advised by nephrology to cut torsemide from 10 to 5, however pt states pharmacy did not get rx so she has continued to take torsemide 10. Pt denies fevers or CP. Endorses chronic dyspnea. In the ED, HR sinus 90-110s and /120s. Labs significant for SCr 4.9 and  mg/dl prot. CTH, c-spine wnl. CXR clear. XR R knee, tib+fib, L hip wnl. S/p 2L IVF and pain medication. Admitted to medicine for further management. (26 Jan 2023 05:21)      ENDOCRINE HISTORY   Diagnosed with DM: 2 for since 2010  Last HbA1c: 10.6  Endocrinologist: managed by primary care doctor  Home DM Meds: basaglar 36 units, trulicity 0.75mg subcutaneous weekly, jardiance 10mg daily, takes prednisone 10mg daily chronically since 2010 for Sarcoidosis  Adherence: occasionally misses doses  Microvascular complications: endorses nephropathy, neuropathy in feet, denies retinopathy  Macrovascular complications: denies MI or CVA  SMBG: has freestyle moshe  Symptoms: denies polyuria, polydipsia, endorses neuropathy  Hypoglycemia episodes: denies  BG at home: am 180-250, before lunch: 180-200, before dinner: similar to lunch, bedtime 200s  Diet at home:  - Breakfast: crackers, egg, cereal  - Lunch: spaghetti, vegetables, spinach, eggplant  - Dinner: same as lunch  - Snacks: denies  - Denies juice or soda  Appetite in hospital: poor  Exercise: denies  PMHx: as above  PSHx: as above  Family hx: mother and father with diabetes, denies thyroid conditions  Social hx: denies tobacco use, alcohol use, recreational drug use or marijuana  Insurance: medicaid and medicare  Resides in: [place]      PAST MEDICAL & SURGICAL HISTORY:  Sarcoidosis      Diabetes      Hypertension      Hyperlipidemia      Chronic kidney disease (CKD), stage III (moderate)      Seizure disorder      No significant past surgical history          FAMILY HISTORY:  Family history of diabetes mellitus in first degree relative        Social History:  Lives with two children  Not working  Denies substance use  Needs assistance to walk (26 Jan 2023 05:21)      Home Medications:  acetaminophen 325 mg oral tablet: 2 tab(s) orally every 6 hours, As needed, Temp greater or equal to 38C (100.4F), Mild Pain (1 - 3), Moderate Pain (4 - 6) (26 Jan 2023 04:46)  albuterol 90 mcg/inh inhalation aerosol: 2 puff(s) inhaled every 4 hours, As Needed (26 Jan 2023 04:46)  amLODIPine 10 mg oral tablet: 1 tab(s) orally once a day (26 Jan 2023 04:46)  Basaglar KwikPen 100 units/mL subcutaneous solution: 36 unit(s) subcutaneous once a day (at bedtime) (26 Jan 2023 04:46)  diclofenac 1% topical gel: Apply topically to affected area 3 times a day, As Needed (26 Jan 2023 04:46)  famotidine 20 mg oral tablet: 1 tab(s) orally once a day (26 Jan 2023 04:46)  fluticasone 50 mcg/inh nasal spray: 1 spray(s) nasal 2 times a day (26 Jan 2023 04:46)  Jardiance 10 mg oral tablet: 1 tab(s) orally once a day (in the morning) (26 Jan 2023 04:46)  losartan 100 mg oral tablet: 1 tab(s) orally once a day (26 Jan 2023 04:46)  predniSONE 10 mg oral tablet: 1 tab(s) orally once a day (26 Jan 2023 04:46)  rosuvastatin 40 mg oral tablet: 1 tab(s) orally once a day (26 Jan 2023 04:46)  torsemide 10 mg oral tablet: 1 tab(s) orally once a day (26 Jan 2023 04:46)  traMADol 50 mg oral tablet: 1 tab(s) orally every 6 hours, As Needed (26 Jan 2023 04:46)  Trulicity Pen 0.75 mg/0.5 mL subcutaneous solution: 0.75 milligram(s) subcutaneous once a week (26 Jan 2023 04:46)      MEDICATIONS  (STANDING):  amLODIPine   Tablet 10 milliGRAM(s) Oral daily  chlorhexidine 2% Cloths 1 Application(s) Topical daily  dextrose 5%. 1000 milliLiter(s) (100 mL/Hr) IV Continuous <Continuous>  dextrose 5%. 1000 milliLiter(s) (50 mL/Hr) IV Continuous <Continuous>  dextrose 50% Injectable 25 Gram(s) IV Push once  dextrose 50% Injectable 12.5 Gram(s) IV Push once  dextrose 50% Injectable 25 Gram(s) IV Push once  ergocalciferol 42775 Unit(s) Oral <User Schedule>  glucagon  Injectable 1 milliGRAM(s) IntraMuscular once  heparin   Injectable 5000 Unit(s) SubCutaneous every 8 hours  hydrALAZINE 100 milliGRAM(s) Oral three times a day  influenza   Vaccine 0.5 milliLiter(s) IntraMuscular once  insulin glargine Injectable (LANTUS) 32 Unit(s) SubCutaneous at bedtime  insulin lispro (ADMELOG) corrective regimen sliding scale   SubCutaneous at bedtime  insulin lispro (ADMELOG) corrective regimen sliding scale   SubCutaneous three times a day before meals  insulin lispro Injectable (ADMELOG) 12 Unit(s) SubCutaneous three times a day before meals  lidocaine   4% Patch 1 Patch Transdermal every 24 hours  pantoprazole    Tablet 40 milliGRAM(s) Oral before breakfast  predniSONE   Tablet 10 milliGRAM(s) Oral daily  senna 2 Tablet(s) Oral at bedtime  sodium chloride 0.9%. 1000 milliLiter(s) (100 mL/Hr) IV Continuous <Continuous>    MEDICATIONS  (PRN):  acetaminophen     Tablet .. 650 milliGRAM(s) Oral every 6 hours PRN Temp greater or equal to 38C (100.4F), Mild Pain (1 - 3)  albuterol    90 MICROgram(s) HFA Inhaler 2 Puff(s) Inhalation every 6 hours PRN Shortness of Breath and/or Wheezing  dextrose Oral Gel 15 Gram(s) Oral once PRN Blood Glucose LESS THAN 70 milliGRAM(s)/deciliter  HYDROmorphone   Tablet 2 milliGRAM(s) Oral every 4 hours PRN Severe Pain (7 - 10)  ondansetron Injectable 4 milliGRAM(s) IV Push every 8 hours PRN Nausea and/or Vomiting  polyethylene glycol 3350 17 Gram(s) Oral two times a day PRN Constipation      Allergies    No Known Allergies    Intolerances        REVIEW OF SYSTEMS  Constitutional: No fever, chills  Eyes: No blurry vision  Neuro: No tremors  HEENT: No pain  Cardiovascular: No chest pain, palpitations  Respiratory: No SOB, no cough  GI: Endorses nausea, denies vomiting, abdominal pain, endorses mild constipation  : No dysuria  Skin: no rash  Psych: no depression  Endocrine: no polyuria, polydipsia  Hem/lymph: no swelling  Osteoporosis: no fractures  ALL OTHER SYSTEMS REVIEWED AND NEGATIVE       PHYSICAL EXAM   Vital Signs Last 24 Hrs  T(C): 36.9 (29 Jan 2023 04:31), Max: 37 (28 Jan 2023 20:52)  T(F): 98.5 (29 Jan 2023 04:31), Max: 98.6 (28 Jan 2023 20:52)  HR: 108 (29 Jan 2023 06:31) (105 - 114)  BP: 162/95 (29 Jan 2023 06:31) (149/85 - 176/93)  BP(mean): --  RR: 18 (29 Jan 2023 04:31) (18 - 18)  SpO2: 98% (29 Jan 2023 04:31) (98% - 98%)    Parameters below as of 29 Jan 2023 04:31  Patient On (Oxygen Delivery Method): room air      GENERAL: NAD, well-groomed, well-developed  EYES: No proptosis, no lid lag, anicteric  HEENT:  Atraumatic, Normocephalic, moist mucous membranes  THYROID: Normal size, no palpable nodules  RESPIRATORY: Clear to auscultation bilaterally; No rales, rhonchi, wheezing  CARDIOVASCULAR: Regular rate and rhythm; No murmurs; no peripheral edema  GI: Soft, nontender, non distended, normal bowel sounds  SKIN: Dry, intact, No rashes or lesions  MUSCULOSKELETAL: Full range of motion, normal strength  NEURO: sensation intact, extraocular movements intact, no tremor  PSYCH: Alert and oriented x 3, normal affect, normal mood  CUSHING'S SIGNS: no striae    CAPILLARY BLOOD GLUCOSE      POCT Blood Glucose.: 151 mg/dL (29 Jan 2023 08:22)  POCT Blood Glucose.: 187 mg/dL (28 Jan 2023 21:08)  POCT Blood Glucose.: 118 mg/dL (28 Jan 2023 17:15)  POCT Blood Glucose.: 144 mg/dL (28 Jan 2023 12:17)      A1C with Estimated Average Glucose Result: 10.6 % (01-27-23 @ 18:12)  A1C with Estimated Average Glucose Result: 10.6 % (01-27-23 @ 06:44)                            8.9    6.55  )-----------( 283      ( 29 Jan 2023 07:01 )             27.0       01-29    140  |  112<H>  |  44<H>  ----------------------------<  96  4.6   |  17<L>  |  4.56<H>    Ca    8.8      29 Jan 2023 07:01  Phos  5.0     01-29  Mg     1.8     01-29    TPro  5.8<L>  /  Alb  3.0<L>  /  TBili  <0.1<L>  /  DBili  x   /  AST  21  /  ALT  13  /  AlkPhos  40  01-29      Thyroid Stimulating Hormone, Serum: 1.71 uIU/mL (01-28-23 @ 07:29)      LIPIDS    RADIOLOGY ENDOCRINE INITIAL CONSULT - diabetes    HPI:  52F with sarcoidosis, T2DM, and CKD who presents c/o b/l MSK pains x 1.5 mo and n/v x 1 week. She states that the pains are constant. Denies noticing any erythema, swelling or rash. Pain involves R knee down the shin and L hip. States that she uses tylenol, tramadol and diclofenac gel for the pain. States that she has had poor PO intake over the past week and has felt increasingly nauseous, seemingly exacerbated by tramadol, over the past few days. Unable to take BP pills over the past two days because of nausea. Still having BM and passing gas. FS over past 2wks reviewed, running 200-400s. Denies any urinary sx. Of note, SCr was 4.19 1/11 and was advised by nephrology to cut torsemide from 10 to 5, however pt states pharmacy did not get rx so she has continued to take torsemide 10. Pt denies fevers or CP. Endorses chronic dyspnea. In the ED, HR sinus 90-110s and /120s. Labs significant for SCr 4.9 and  mg/dl prot. CTH, c-spine wnl. CXR clear. XR R knee, tib+fib, L hip wnl. S/p 2L IVF and pain medication. Admitted to medicine for further management. (26 Jan 2023 05:21)      ENDOCRINE HISTORY   Diagnosed with DM: 2 for since 2010  Last HbA1c: 10.6  Endocrinologist: managed by primary care doctor  Home DM Meds: basaglar 36 units, trulicity 0.75mg subcutaneous weekly, jardiance 10mg daily, takes prednisone 10mg daily chronically since 2010 for Sarcoidosis  Adherence: occasionally misses doses  Microvascular complications: endorses nephropathy, neuropathy in feet, denies retinopathy  Macrovascular complications: denies MI or CVA  SMBG: has freestyle moshe  Symptoms: denies polyuria, polydipsia, endorses neuropathy  Hypoglycemia episodes: denies  BG at home: am 180-250, before lunch: 180-200, before dinner: similar to lunch, bedtime 200s  Diet at home:  - Breakfast: crackers, egg, cereal  - Lunch: spaghetti, vegetables, spinach, eggplant  - Dinner: same as lunch  - Snacks: denies  - Denies juice or soda  Appetite in hospital: poor  Exercise: denies  PMHx: as above  PSHx: as above  Family hx: mother and father with diabetes, denies thyroid conditions  Social hx: denies tobacco use, alcohol use, recreational drug use or marijuana  Insurance: medicaid and medicare  Resides in: [place]      PAST MEDICAL & SURGICAL HISTORY:  Sarcoidosis      Diabetes      Hypertension      Hyperlipidemia      Chronic kidney disease (CKD), stage III (moderate)      Seizure disorder      No significant past surgical history          FAMILY HISTORY:  Family history of diabetes mellitus in first degree relative        Social History:  Lives with two children  Not working  Denies substance use  Needs assistance to walk (26 Jan 2023 05:21)      Home Medications:  acetaminophen 325 mg oral tablet: 2 tab(s) orally every 6 hours, As needed, Temp greater or equal to 38C (100.4F), Mild Pain (1 - 3), Moderate Pain (4 - 6) (26 Jan 2023 04:46)  albuterol 90 mcg/inh inhalation aerosol: 2 puff(s) inhaled every 4 hours, As Needed (26 Jan 2023 04:46)  amLODIPine 10 mg oral tablet: 1 tab(s) orally once a day (26 Jan 2023 04:46)  Basaglar KwikPen 100 units/mL subcutaneous solution: 36 unit(s) subcutaneous once a day (at bedtime) (26 Jan 2023 04:46)  diclofenac 1% topical gel: Apply topically to affected area 3 times a day, As Needed (26 Jan 2023 04:46)  famotidine 20 mg oral tablet: 1 tab(s) orally once a day (26 Jan 2023 04:46)  fluticasone 50 mcg/inh nasal spray: 1 spray(s) nasal 2 times a day (26 Jan 2023 04:46)  Jardiance 10 mg oral tablet: 1 tab(s) orally once a day (in the morning) (26 Jan 2023 04:46)  losartan 100 mg oral tablet: 1 tab(s) orally once a day (26 Jan 2023 04:46)  predniSONE 10 mg oral tablet: 1 tab(s) orally once a day (26 Jan 2023 04:46)  rosuvastatin 40 mg oral tablet: 1 tab(s) orally once a day (26 Jan 2023 04:46)  torsemide 10 mg oral tablet: 1 tab(s) orally once a day (26 Jan 2023 04:46)  traMADol 50 mg oral tablet: 1 tab(s) orally every 6 hours, As Needed (26 Jan 2023 04:46)  Trulicity Pen 0.75 mg/0.5 mL subcutaneous solution: 0.75 milligram(s) subcutaneous once a week (26 Jan 2023 04:46)      MEDICATIONS  (STANDING):  amLODIPine   Tablet 10 milliGRAM(s) Oral daily  chlorhexidine 2% Cloths 1 Application(s) Topical daily  dextrose 5%. 1000 milliLiter(s) (100 mL/Hr) IV Continuous <Continuous>  dextrose 5%. 1000 milliLiter(s) (50 mL/Hr) IV Continuous <Continuous>  dextrose 50% Injectable 25 Gram(s) IV Push once  dextrose 50% Injectable 12.5 Gram(s) IV Push once  dextrose 50% Injectable 25 Gram(s) IV Push once  ergocalciferol 61338 Unit(s) Oral <User Schedule>  glucagon  Injectable 1 milliGRAM(s) IntraMuscular once  heparin   Injectable 5000 Unit(s) SubCutaneous every 8 hours  hydrALAZINE 100 milliGRAM(s) Oral three times a day  influenza   Vaccine 0.5 milliLiter(s) IntraMuscular once  insulin glargine Injectable (LANTUS) 32 Unit(s) SubCutaneous at bedtime  insulin lispro (ADMELOG) corrective regimen sliding scale   SubCutaneous at bedtime  insulin lispro (ADMELOG) corrective regimen sliding scale   SubCutaneous three times a day before meals  insulin lispro Injectable (ADMELOG) 12 Unit(s) SubCutaneous three times a day before meals  lidocaine   4% Patch 1 Patch Transdermal every 24 hours  pantoprazole    Tablet 40 milliGRAM(s) Oral before breakfast  predniSONE   Tablet 10 milliGRAM(s) Oral daily  senna 2 Tablet(s) Oral at bedtime  sodium chloride 0.9%. 1000 milliLiter(s) (100 mL/Hr) IV Continuous <Continuous>    MEDICATIONS  (PRN):  acetaminophen     Tablet .. 650 milliGRAM(s) Oral every 6 hours PRN Temp greater or equal to 38C (100.4F), Mild Pain (1 - 3)  albuterol    90 MICROgram(s) HFA Inhaler 2 Puff(s) Inhalation every 6 hours PRN Shortness of Breath and/or Wheezing  dextrose Oral Gel 15 Gram(s) Oral once PRN Blood Glucose LESS THAN 70 milliGRAM(s)/deciliter  HYDROmorphone   Tablet 2 milliGRAM(s) Oral every 4 hours PRN Severe Pain (7 - 10)  ondansetron Injectable 4 milliGRAM(s) IV Push every 8 hours PRN Nausea and/or Vomiting  polyethylene glycol 3350 17 Gram(s) Oral two times a day PRN Constipation      Allergies    No Known Allergies    Intolerances        REVIEW OF SYSTEMS  Constitutional: No fever, chills  Eyes: No blurry vision  Neuro: No tremors  HEENT: No pain  Cardiovascular: No chest pain, palpitations  Respiratory: No SOB, no cough  GI: Endorses nausea, denies vomiting, abdominal pain, endorses mild constipation  : No dysuria  Skin: no rash  Psych: no depression  Endocrine: no polyuria, polydipsia  Hem/lymph: no swelling  Osteoporosis: no fractures  ALL OTHER SYSTEMS REVIEWED AND NEGATIVE       PHYSICAL EXAM   Vital Signs Last 24 Hrs  T(C): 36.9 (29 Jan 2023 04:31), Max: 37 (28 Jan 2023 20:52)  T(F): 98.5 (29 Jan 2023 04:31), Max: 98.6 (28 Jan 2023 20:52)  HR: 108 (29 Jan 2023 06:31) (105 - 114)  BP: 162/95 (29 Jan 2023 06:31) (149/85 - 176/93)  BP(mean): --  RR: 18 (29 Jan 2023 04:31) (18 - 18)  SpO2: 98% (29 Jan 2023 04:31) (98% - 98%)    Parameters below as of 29 Jan 2023 04:31  Patient On (Oxygen Delivery Method): room air      GENERAL: NAD, sitting up in bed  EYES: No proptosis, no lid lag, anicteric  HEENT:  Atraumatic, Normocephalic, moist mucous membranes  THYROID: Thyroid mildly enlarged, no palpable nodules  RESPIRATORY: Clear to auscultation bilaterally; No rales, rhonchi, wheezing  CARDIOVASCULAR: Regular rate and rhythm; No murmurs; no peripheral edema  GI: Soft, nontender, non distended, normal bowel sounds  SKIN: Dry, intact, No rashes or lesions  MUSCULOSKELETAL: Full range of motion, normal strength  NEURO: sensation intact, extraocular movements intact, no tremor  PSYCH: Answering questions appropriately, normal affect, normal mood  CUSHING'S SIGNS: no striae, no acanthosis, no dorsocervical fat pad    CAPILLARY BLOOD GLUCOSE      POCT Blood Glucose.: 151 mg/dL (29 Jan 2023 08:22)  POCT Blood Glucose.: 187 mg/dL (28 Jan 2023 21:08)  POCT Blood Glucose.: 118 mg/dL (28 Jan 2023 17:15)  POCT Blood Glucose.: 144 mg/dL (28 Jan 2023 12:17)      A1C with Estimated Average Glucose Result: 10.6 % (01-27-23 @ 18:12)  A1C with Estimated Average Glucose Result: 10.6 % (01-27-23 @ 06:44)                            8.9    6.55  )-----------( 283      ( 29 Jan 2023 07:01 )             27.0       01-29    140  |  112<H>  |  44<H>  ----------------------------<  96  4.6   |  17<L>  |  4.56<H>    Ca    8.8      29 Jan 2023 07:01  Phos  5.0     01-29  Mg     1.8     01-29    TPro  5.8<L>  /  Alb  3.0<L>  /  TBili  <0.1<L>  /  DBili  x   /  AST  21  /  ALT  13  /  AlkPhos  40  01-29      Thyroid Stimulating Hormone, Serum: 1.71 uIU/mL (01-28-23 @ 07:29)      LIPIDS    RADIOLOGY ENDOCRINE INITIAL CONSULT - diabetes    HPI:  52F with sarcoidosis, T2DM, and CKD who presents c/o b/l MSK pains x 1.5 mo and n/v x 1 week. She states that the pains are constant. Denies noticing any erythema, swelling or rash. Pain involves R knee down the shin and L hip. States that she uses tylenol, tramadol and diclofenac gel for the pain. States that she has had poor PO intake over the past week and has felt increasingly nauseous, seemingly exacerbated by tramadol, over the past few days. Unable to take BP pills over the past two days because of nausea. Still having BM and passing gas. FS over past 2wks reviewed, running 200-400s. Denies any urinary sx. Of note, SCr was 4.19 1/11 and was advised by nephrology to cut torsemide from 10 to 5, however pt states pharmacy did not get rx so she has continued to take torsemide 10. Pt denies fevers or CP. Endorses chronic dyspnea. In the ED, HR sinus 90-110s and /120s. Labs significant for SCr 4.9 and  mg/dl prot. CTH, c-spine wnl. CXR clear. XR R knee, tib+fib, L hip wnl. S/p 2L IVF and pain medication. Admitted to medicine for further management. (26 Jan 2023 05:21)      ENDOCRINE HISTORY   Diagnosed with DM: 2 for since 2010  Last HbA1c: 10.6  Endocrinologist: managed by primary care doctor  Home DM Meds: basaglar 36 units, trulicity 0.75mg subcutaneous weekly, jardiance 10mg daily, takes prednisone 10mg daily chronically since 2010 for Sarcoidosis  Adherence: occasionally misses doses  Microvascular complications: endorses nephropathy, neuropathy in feet, denies retinopathy  Macrovascular complications: denies MI or CVA  SMBG: has freestyle moshe  Symptoms: denies polyuria, polydipsia, endorses neuropathy  Hypoglycemia episodes: denies  BG at home: am 180-250, before lunch: 180-200, before dinner: similar to lunch, bedtime 200s  Diet at home:  - Breakfast: crackers, egg, cereal  - Lunch: spaghetti, vegetables, spinach, eggplant  - Dinner: same as lunch  - Snacks: denies  - Denies juice or soda  Appetite in hospital: poor  Exercise: denies  PMHx: as above  PSHx: as above  Family hx: mother and father with diabetes, denies thyroid conditions  Social hx: denies tobacco use, alcohol use, recreational drug use or marijuana  Insurance: medicaid and medicare  Resides in: [place]      PAST MEDICAL & SURGICAL HISTORY:  Sarcoidosis      Diabetes      Hypertension      Hyperlipidemia      Chronic kidney disease (CKD), stage III (moderate)      Seizure disorder      No significant past surgical history          FAMILY HISTORY:  Family history of diabetes mellitus in first degree relative        Social History:  Lives with two children  Not working  Denies substance use  Needs assistance to walk (26 Jan 2023 05:21)      Home Medications:  acetaminophen 325 mg oral tablet: 2 tab(s) orally every 6 hours, As needed, Temp greater or equal to 38C (100.4F), Mild Pain (1 - 3), Moderate Pain (4 - 6) (26 Jan 2023 04:46)  albuterol 90 mcg/inh inhalation aerosol: 2 puff(s) inhaled every 4 hours, As Needed (26 Jan 2023 04:46)  amLODIPine 10 mg oral tablet: 1 tab(s) orally once a day (26 Jan 2023 04:46)  Basaglar KwikPen 100 units/mL subcutaneous solution: 36 unit(s) subcutaneous once a day (at bedtime) (26 Jan 2023 04:46)  diclofenac 1% topical gel: Apply topically to affected area 3 times a day, As Needed (26 Jan 2023 04:46)  famotidine 20 mg oral tablet: 1 tab(s) orally once a day (26 Jan 2023 04:46)  fluticasone 50 mcg/inh nasal spray: 1 spray(s) nasal 2 times a day (26 Jan 2023 04:46)  Jardiance 10 mg oral tablet: 1 tab(s) orally once a day (in the morning) (26 Jan 2023 04:46)  losartan 100 mg oral tablet: 1 tab(s) orally once a day (26 Jan 2023 04:46)  predniSONE 10 mg oral tablet: 1 tab(s) orally once a day (26 Jan 2023 04:46)  rosuvastatin 40 mg oral tablet: 1 tab(s) orally once a day (26 Jan 2023 04:46)  torsemide 10 mg oral tablet: 1 tab(s) orally once a day (26 Jan 2023 04:46)  traMADol 50 mg oral tablet: 1 tab(s) orally every 6 hours, As Needed (26 Jan 2023 04:46)  Trulicity Pen 0.75 mg/0.5 mL subcutaneous solution: 0.75 milligram(s) subcutaneous once a week (26 Jan 2023 04:46)      MEDICATIONS  (STANDING):  amLODIPine   Tablet 10 milliGRAM(s) Oral daily  chlorhexidine 2% Cloths 1 Application(s) Topical daily  dextrose 5%. 1000 milliLiter(s) (100 mL/Hr) IV Continuous <Continuous>  dextrose 5%. 1000 milliLiter(s) (50 mL/Hr) IV Continuous <Continuous>  dextrose 50% Injectable 25 Gram(s) IV Push once  dextrose 50% Injectable 12.5 Gram(s) IV Push once  dextrose 50% Injectable 25 Gram(s) IV Push once  ergocalciferol 29349 Unit(s) Oral <User Schedule>  glucagon  Injectable 1 milliGRAM(s) IntraMuscular once  heparin   Injectable 5000 Unit(s) SubCutaneous every 8 hours  hydrALAZINE 100 milliGRAM(s) Oral three times a day  influenza   Vaccine 0.5 milliLiter(s) IntraMuscular once  insulin glargine Injectable (LANTUS) 32 Unit(s) SubCutaneous at bedtime  insulin lispro (ADMELOG) corrective regimen sliding scale   SubCutaneous at bedtime  insulin lispro (ADMELOG) corrective regimen sliding scale   SubCutaneous three times a day before meals  insulin lispro Injectable (ADMELOG) 12 Unit(s) SubCutaneous three times a day before meals  lidocaine   4% Patch 1 Patch Transdermal every 24 hours  pantoprazole    Tablet 40 milliGRAM(s) Oral before breakfast  predniSONE   Tablet 10 milliGRAM(s) Oral daily  senna 2 Tablet(s) Oral at bedtime  sodium chloride 0.9%. 1000 milliLiter(s) (100 mL/Hr) IV Continuous <Continuous>    MEDICATIONS  (PRN):  acetaminophen     Tablet .. 650 milliGRAM(s) Oral every 6 hours PRN Temp greater or equal to 38C (100.4F), Mild Pain (1 - 3)  albuterol    90 MICROgram(s) HFA Inhaler 2 Puff(s) Inhalation every 6 hours PRN Shortness of Breath and/or Wheezing  dextrose Oral Gel 15 Gram(s) Oral once PRN Blood Glucose LESS THAN 70 milliGRAM(s)/deciliter  HYDROmorphone   Tablet 2 milliGRAM(s) Oral every 4 hours PRN Severe Pain (7 - 10)  ondansetron Injectable 4 milliGRAM(s) IV Push every 8 hours PRN Nausea and/or Vomiting  polyethylene glycol 3350 17 Gram(s) Oral two times a day PRN Constipation      Allergies    No Known Allergies    Intolerances        REVIEW OF SYSTEMS  Constitutional: No fever, chills  Eyes: No blurry vision  Neuro: No tremors  HEENT: No pain  Cardiovascular: No chest pain, palpitations  Respiratory: No SOB, no cough  GI: Endorses nausea, denies vomiting, abdominal pain, endorses mild constipation  : No dysuria  Skin: no rash  Psych: no depression  Endocrine: no polyuria, polydipsia  Hem/lymph: no swelling  Osteoporosis: no fractures  ALL OTHER SYSTEMS REVIEWED AND NEGATIVE       PHYSICAL EXAM   Vital Signs Last 24 Hrs  T(C): 36.9 (29 Jan 2023 04:31), Max: 37 (28 Jan 2023 20:52)  T(F): 98.5 (29 Jan 2023 04:31), Max: 98.6 (28 Jan 2023 20:52)  HR: 108 (29 Jan 2023 06:31) (105 - 114)  BP: 162/95 (29 Jan 2023 06:31) (149/85 - 176/93)  BP(mean): --  RR: 18 (29 Jan 2023 04:31) (18 - 18)  SpO2: 98% (29 Jan 2023 04:31) (98% - 98%)    Parameters below as of 29 Jan 2023 04:31  Patient On (Oxygen Delivery Method): room air      GENERAL: NAD, sitting up in bed  EYES: No proptosis, no lid lag, anicteric  HEENT:  Atraumatic, Normocephalic, moist mucous membranes  THYROID: Normal thyroid gland, no palpable nodules  RESPIRATORY: Clear to auscultation bilaterally; No rales, rhonchi, wheezing  CARDIOVASCULAR: Regular rate and rhythm; No murmurs; no peripheral edema  GI: Soft, nontender, non distended, normal bowel sounds  SKIN: Dry, intact, No rashes or lesions  MUSCULOSKELETAL: Full range of motion, normal strength  NEURO: sensation intact, extraocular movements intact, no tremor  PSYCH: Answering questions appropriately, normal affect, normal mood  CUSHING'S SIGNS: no striae, no acanthosis, no dorsocervical fat pad    CAPILLARY BLOOD GLUCOSE      POCT Blood Glucose.: 151 mg/dL (29 Jan 2023 08:22)  POCT Blood Glucose.: 187 mg/dL (28 Jan 2023 21:08)  POCT Blood Glucose.: 118 mg/dL (28 Jan 2023 17:15)  POCT Blood Glucose.: 144 mg/dL (28 Jan 2023 12:17)      A1C with Estimated Average Glucose Result: 10.6 % (01-27-23 @ 18:12)  A1C with Estimated Average Glucose Result: 10.6 % (01-27-23 @ 06:44)                            8.9    6.55  )-----------( 283      ( 29 Jan 2023 07:01 )             27.0       01-29    140  |  112<H>  |  44<H>  ----------------------------<  96  4.6   |  17<L>  |  4.56<H>    Ca    8.8      29 Jan 2023 07:01  Phos  5.0     01-29  Mg     1.8     01-29    TPro  5.8<L>  /  Alb  3.0<L>  /  TBili  <0.1<L>  /  DBili  x   /  AST  21  /  ALT  13  /  AlkPhos  40  01-29      Thyroid Stimulating Hormone, Serum: 1.71 uIU/mL (01-28-23 @ 07:29)      LIPIDS    RADIOLOGY

## 2023-01-29 NOTE — PROGRESS NOTE ADULT - ATTENDING COMMENTS
53 yo f w pmh dm, htn, hld, ckd, sarcoidosis, chronic pain, p/w myalgias + arthralgias, found to have karla on ckd vs progressing ckd + uncontrolled dm + uncontrolled bp, admitted to medicine for further mgmt    of note, known to be nonadherent/noncompliant with medications and follow up visits.    karla on ckd  unclear baseline cr; 7/2022, cr 2.91 -> 9/2022, cr 3.36 ->11/2022, cr 2.94 -> 1/11/2023, cr 4.19 ->1/25/23, cr 4.9  per outpatient nephrology documentation; ckd deemed to be 2/2 diabetic nephropathy  s/p 2 L NS in ER  Monitor strict I/Os, daily weights, UO, BUN/Cr, volume status, acid-base balance, electrolytes   renal/bladder US done showing parenchymal disease    avoid nephrotoxic agents (including home acei/arb + diuretics); appropriate dose adjustments for all renally cleared medications   can consider nephro consult if no improvement noted to kidney function   - slight improvement in creatinine    poorly controlled dm  a1c noted to be 10.6   monitor fingersticks tidac + hs  carb consistent diet  hold home antidm meds, start home dose basal glargine + low dose correctional scale lispro tidac + hs; adjust to maintain goal bg 140-180 mg/dl  - blood sugars currently improving   - endo consult for further evaluation in setting of elevated A1c; recs noted and changed; f/u official consult note     poorly controlled htn  replace home nephrotoxic antihtn with hydralazine; improving now with higher dose of hydralazine   - can also consider adding isodril vs imdur  adjust antihtn regimen to maintain goal bp <150/90 mmhg    PT eval: home PT    Rheum consult noted; f/u JOSLYN, dsDNA, ACE level; outpatient rheum follow up   Vit d supplementation for low Vit D levels

## 2023-01-29 NOTE — PROGRESS NOTE ADULT - PROBLEM SELECTOR PLAN 2
-Home: Basaglar 36u qhs, Jardiance 10, Trulicity qweek  > Endo recs     > lantus 32u qhs, ademelrosa 12u, MDISS for meals, low dose ISS for bedtime     > f/u A1c  > Noted exacerbation w/ prednisone

## 2023-01-29 NOTE — CONSULT NOTE ADULT - ASSESSMENT
52F with sarcoidosis, T2DM, and CKD who presents c/o b/l MSK pains x 1.5 mo and n/v x 1 week. She states that the pains are constant. Denies noticing any erythema, swelling or rash. Pain involves R knee down the shin and L hip. Endocrinology consulted for management of diabetes.    Poorly controlled T2DM with hyperglycemia  Steroid Induced hyperglycemia  - HbA1c: 10.6  - Home Regimen:  jardiance 10mg daily, trulicity 0.75mg subcutaneous weekly, basaglar 36 units nightly  - Endocrinologist:  - patient is on prednisone 10mg daily  PLAN  - Hold oral DM agents while inpatient  - Start Lantus  units at bedtime. DO NOT HOLD IF NPO.  - Start Admelog  units TID pre-meal. HOLD IF NPO.  - Use moderate/Use low dose Admelog correction scale pre-meal  - Use moderate/Use low dose Admelog correction scale at bedtime  - Fingerstick BG before meals and bedtime  - Goal -180  - Carbohydrate consistent diet  - RD consult  Discharge plan:  - Discharge medications: ************************  - Patient will need education on how to self-administer insulin, how to check FSBG, as well as hypoglycemia management. Patient to call doctor with persistent high or low BG at home.   - Ensure patient has glucometer, test strips and lancets on discharge.  - Recommend routine outpatient ophthalmology, podiatry and endocrinology f/u    HTN  - Home regimen:  PLAN  - Continue  - Outpatient goal BP <130/80. Management per primary team.    HLD  - Home regimen: rosuvastatin 40mg daily  PLAN  - Would likely benefit from a statin if no contraindication  - Can check lipid profile if not done recently    Discussed with primary team.    Francisco Grace MD, Endocrinology Fellow  Pager [phone] from 9am to 5pm. After hours and on weekends, please call [phone].   52F with sarcoidosis, T2DM, and CKD who presents c/o b/l MSK pains x 1.5 mo and n/v x 1 week. She states that the pains are constant. Denies noticing any erythema, swelling or rash. Pain involves R knee down the shin and L hip. Endocrinology consulted for management of diabetes.    Poorly controlled T2DM with hyperglycemia  Steroid Induced hyperglycemia  - HbA1c: 10.6  - Home Regimen:  jardiance 10mg daily, trulicity 0.75mg subcutaneous weekly, basaglar 36 units nightly  - Endocrinologist:  - patient is on prednisone 10mg daily  PLAN  - Hold oral DM agents while inpatient  - Start Lantus  units at bedtime. DO NOT HOLD IF NPO.  - Start Admelog  units TID pre-meal. HOLD IF NPO.  - Use moderate/Use low dose Admelog correction scale pre-meal  - Use moderate/Use low dose Admelog correction scale at bedtime  - Fingerstick BG before meals and bedtime  - Goal -180  - Carbohydrate consistent diet  - RD consult  Discharge plan:  - Discharge medications: ************************  - Patient will need education on how to self-administer insulin, how to check FSBG, as well as hypoglycemia management. Patient to call doctor with persistent high or low BG at home.   - Ensure patient has glucometer, test strips and lancets on discharge.  - Recommend routine outpatient ophthalmology, podiatry and endocrinology f/u    Mildly enlarged thyroid  - clinically euthyroid  - appreciated on exam  PLAN  -  thyroid outpatient    HTN  - Home regimen:  PLAN  - Continue  - Outpatient goal BP <130/80. Management per primary team.    HLD  - Home regimen: rosuvastatin 40mg daily  PLAN  - Would likely benefit from a statin if no contraindication  - Can check lipid profile if not done recently    Discussed with primary team.    Francisco Grace MD, Endocrinology Fellow  Pager [phone] from 9am to 5pm. After hours and on weekends, please call [phone].   52F with sarcoidosis, T2DM, and CKD who presents c/o b/l MSK pains x 1.5 mo and n/v x 1 week. She states that the pains are constant. Denies noticing any erythema, swelling or rash. Pain involves R knee down the shin and L hip. Endocrinology consulted for management of diabetes.    Poorly controlled T2DM with hyperglycemia  Steroid Induced hyperglycemia  - HbA1c: 10.6  - Home Regimen:  jardiance 10mg daily, trulicity 0.75mg subcutaneous weekly, basaglar 36 units nightly  - Endocrinologist:  - patient is on prednisone 10mg daily  PLAN  - Hold oral DM agents while inpatient  - Start Lantus  units at bedtime. DO NOT HOLD IF NPO.  - Start Admelog  units TID pre-meal. HOLD IF NPO.  - Use moderate/Use low dose Admelog correction scale pre-meal  - Use moderate/Use low dose Admelog correction scale at bedtime  - Fingerstick BG before meals and bedtime  - Goal -180  - Carbohydrate consistent diet  - RD consult  Discharge plan:  - Discharge medications: ************************  - Patient will need education on how to self-administer insulin, how to check FSBG, as well as hypoglycemia management. Patient to call doctor with persistent high or low BG at home.   - Ensure patient has glucometer, test strips and lancets on discharge.  - Recommend routine outpatient ophthalmology, podiatry and endocrinology f/u    Mildly enlarged thyroid  - clinically euthyroid  - appreciated on exam  PLAN  - US thyroid outpatient    HTN  - Home regimen: amlodipine 10mg daily, losartan 100mg daily, torsemide 10 mg daily  PLAN  - Can check urine microalbumin outpatient  - Outpatient goal BP <130/80. Management per primary team.    HLD  - Home regimen: rosuvastatin 40mg daily  PLAN  - Would likely benefit from a statin if no contraindication  - Can check lipid profile if not done recently    Discussed with primary team.    Francisco Grace MD, Endocrinology Fellow  Pager [phone] from 9am to 5pm. After hours and on weekends, please call [phone].   52F with sarcoidosis, T2DM, and CKD who presents c/o b/l MSK pains x 1.5 mo and n/v x 1 week. She states that the pains are constant. Denies noticing any erythema, swelling or rash. Pain involves R knee down the shin and L hip. Endocrinology consulted for management of diabetes.    Poorly controlled T2DM with hyperglycemia  Steroid Induced hyperglycemia  - HbA1c: 10.6  - Home Regimen:  jardiance 10mg daily, trulicity 0.75mg subcutaneous weekly, basaglar 36 units nightly  - Endocrinologist: managed by primary care doctor  - patient is on prednisone 10mg daily  PLAN  - Hold oral DM agents while inpatient  - Continue lantus 32 units at bedtime. DO NOT HOLD IF NPO.  - Continue Admelog 12 units TID pre-meal. HOLD IF NPO.  - Use moderate dose Admelog correction scale pre-meal  - Use low dose Admelog correction scale at bedtime  - Fingerstick BG before meals and bedtime  - Goal -180  - Carbohydrate consistent diet  - RD consult  Discharge plan:  - Discharge medications: basal insulin, possibly uptitrate jardiance and trulicity pending clinical course  - Patient will need education on how to self-administer insulin, how to check FSBG, as well as hypoglycemia management. Patient to call doctor with persistent high or low BG at home.   - Ensure patient has glucometer, test strips and lancets on discharge.  - Recommend routine outpatient ophthalmology, podiatry and endocrinology f/u    HTN  - Home regimen: amlodipine 10mg daily, losartan 100mg daily, torsemide 10 mg daily  PLAN  - Can check urine microalbumin outpatient  - Outpatient goal BP <130/80. Management per primary team.    HLD  - Home regimen: rosuvastatin 40mg daily  PLAN  - Would likely benefit from a statin if no contraindication  - Can check lipid profile if not done recently    Discussed with primary team.    Francisco Grace MD, Endocrinology Fellow  Pager [phone] from 9am to 5pm. After hours and on weekends, please call [phone].

## 2023-01-30 DIAGNOSIS — N18.9 CHRONIC KIDNEY DISEASE, UNSPECIFIED: ICD-10-CM

## 2023-01-30 LAB
ACE SERPL-CCNC: 81 U/L — SIGNIFICANT CHANGE UP (ref 14–82)
ALBUMIN SERPL ELPH-MCNC: 3.1 G/DL — LOW (ref 3.3–5)
ALP SERPL-CCNC: 43 U/L — SIGNIFICANT CHANGE UP (ref 40–120)
ALT FLD-CCNC: 24 U/L — SIGNIFICANT CHANGE UP (ref 10–45)
ANION GAP SERPL CALC-SCNC: 9 MMOL/L — SIGNIFICANT CHANGE UP (ref 5–17)
AST SERPL-CCNC: 24 U/L — SIGNIFICANT CHANGE UP (ref 10–40)
BILIRUB SERPL-MCNC: <0.1 MG/DL — LOW (ref 0.2–1.2)
BUN SERPL-MCNC: 46 MG/DL — HIGH (ref 7–23)
CALCIUM SERPL-MCNC: 8.9 MG/DL — SIGNIFICANT CHANGE UP (ref 8.4–10.5)
CHLORIDE SERPL-SCNC: 109 MMOL/L — HIGH (ref 96–108)
CO2 SERPL-SCNC: 21 MMOL/L — LOW (ref 22–31)
CREAT SERPL-MCNC: 4.77 MG/DL — HIGH (ref 0.5–1.3)
DSDNA AB SER-ACNC: <12 IU/ML — SIGNIFICANT CHANGE UP
EGFR: 11 ML/MIN/1.73M2 — LOW
GLUCOSE BLDC GLUCOMTR-MCNC: 131 MG/DL — HIGH (ref 70–99)
GLUCOSE BLDC GLUCOMTR-MCNC: 139 MG/DL — HIGH (ref 70–99)
GLUCOSE BLDC GLUCOMTR-MCNC: 152 MG/DL — HIGH (ref 70–99)
GLUCOSE BLDC GLUCOMTR-MCNC: 160 MG/DL — HIGH (ref 70–99)
GLUCOSE BLDC GLUCOMTR-MCNC: 222 MG/DL — HIGH (ref 70–99)
GLUCOSE SERPL-MCNC: 125 MG/DL — HIGH (ref 70–99)
HCT VFR BLD CALC: 28.3 % — LOW (ref 34.5–45)
HGB BLD-MCNC: 9 G/DL — LOW (ref 11.5–15.5)
MAGNESIUM SERPL-MCNC: 1.8 MG/DL — SIGNIFICANT CHANGE UP (ref 1.6–2.6)
MCHC RBC-ENTMCNC: 27.4 PG — SIGNIFICANT CHANGE UP (ref 27–34)
MCHC RBC-ENTMCNC: 31.8 GM/DL — LOW (ref 32–36)
MCV RBC AUTO: 86 FL — SIGNIFICANT CHANGE UP (ref 80–100)
NRBC # BLD: 0 /100 WBCS — SIGNIFICANT CHANGE UP (ref 0–0)
PHOSPHATE SERPL-MCNC: 4.5 MG/DL — SIGNIFICANT CHANGE UP (ref 2.5–4.5)
PLATELET # BLD AUTO: 306 K/UL — SIGNIFICANT CHANGE UP (ref 150–400)
POTASSIUM SERPL-MCNC: 4.8 MMOL/L — SIGNIFICANT CHANGE UP (ref 3.5–5.3)
POTASSIUM SERPL-SCNC: 4.8 MMOL/L — SIGNIFICANT CHANGE UP (ref 3.5–5.3)
PROT SERPL-MCNC: 5.7 G/DL — LOW (ref 6–8.3)
RBC # BLD: 3.29 M/UL — LOW (ref 3.8–5.2)
RBC # FLD: 12.8 % — SIGNIFICANT CHANGE UP (ref 10.3–14.5)
SODIUM SERPL-SCNC: 139 MMOL/L — SIGNIFICANT CHANGE UP (ref 135–145)
WBC # BLD: 7.38 K/UL — SIGNIFICANT CHANGE UP (ref 3.8–10.5)
WBC # FLD AUTO: 7.38 K/UL — SIGNIFICANT CHANGE UP (ref 3.8–10.5)

## 2023-01-30 PROCEDURE — 93010 ELECTROCARDIOGRAM REPORT: CPT

## 2023-01-30 PROCEDURE — 99232 SBSQ HOSP IP/OBS MODERATE 35: CPT

## 2023-01-30 PROCEDURE — 99223 1ST HOSP IP/OBS HIGH 75: CPT | Mod: GC

## 2023-01-30 PROCEDURE — 99233 SBSQ HOSP IP/OBS HIGH 50: CPT | Mod: GC

## 2023-01-30 RX ORDER — CHLORHEXIDINE GLUCONATE 213 G/1000ML
1 SOLUTION TOPICAL DAILY
Refills: 0 | Status: DISCONTINUED | OUTPATIENT
Start: 2023-01-30 | End: 2023-01-31

## 2023-01-30 RX ADMIN — Medication 100 MILLIGRAM(S): at 15:10

## 2023-01-30 RX ADMIN — Medication 12 UNIT(S): at 09:32

## 2023-01-30 RX ADMIN — CHLORHEXIDINE GLUCONATE 1 APPLICATION(S): 213 SOLUTION TOPICAL at 15:08

## 2023-01-30 RX ADMIN — HEPARIN SODIUM 5000 UNIT(S): 5000 INJECTION INTRAVENOUS; SUBCUTANEOUS at 22:02

## 2023-01-30 RX ADMIN — LIDOCAINE 1 PATCH: 4 CREAM TOPICAL at 19:29

## 2023-01-30 RX ADMIN — SENNA PLUS 2 TABLET(S): 8.6 TABLET ORAL at 22:01

## 2023-01-30 RX ADMIN — LIDOCAINE 1 PATCH: 4 CREAM TOPICAL at 05:41

## 2023-01-30 RX ADMIN — HEPARIN SODIUM 5000 UNIT(S): 5000 INJECTION INTRAVENOUS; SUBCUTANEOUS at 15:07

## 2023-01-30 RX ADMIN — HEPARIN SODIUM 5000 UNIT(S): 5000 INJECTION INTRAVENOUS; SUBCUTANEOUS at 05:40

## 2023-01-30 RX ADMIN — AMLODIPINE BESYLATE 10 MILLIGRAM(S): 2.5 TABLET ORAL at 05:39

## 2023-01-30 RX ADMIN — Medication 12 UNIT(S): at 17:40

## 2023-01-30 RX ADMIN — INSULIN GLARGINE 32 UNIT(S): 100 INJECTION, SOLUTION SUBCUTANEOUS at 22:04

## 2023-01-30 RX ADMIN — PANTOPRAZOLE SODIUM 40 MILLIGRAM(S): 20 TABLET, DELAYED RELEASE ORAL at 05:39

## 2023-01-30 RX ADMIN — Medication 12 UNIT(S): at 13:09

## 2023-01-30 RX ADMIN — Medication 100 MILLIGRAM(S): at 22:01

## 2023-01-30 RX ADMIN — Medication 10 MILLIGRAM(S): at 05:39

## 2023-01-30 RX ADMIN — Medication 650 MILLIGRAM(S): at 20:56

## 2023-01-30 RX ADMIN — Medication 2: at 09:32

## 2023-01-30 RX ADMIN — Medication 100 MILLIGRAM(S): at 05:38

## 2023-01-30 RX ADMIN — Medication 650 MILLIGRAM(S): at 21:03

## 2023-01-30 RX ADMIN — LIDOCAINE 1 PATCH: 4 CREAM TOPICAL at 07:00

## 2023-01-30 NOTE — CONSULT NOTE ADULT - SUBJECTIVE AND OBJECTIVE BOX
Failureto dilate  Failure to descend  Inadequate uterine contraction [facility] DIVISION OF KIDNEY DISEASES AND HYPERTENSION -- [phone]  -- INITIAL CONSULT NOTE  --------------------------------------------------------------------------------  HPI:  Pt. is a 52 y.o. F with PMHx. of Sarcoidosis, DM2, HTN and CKD presents for worsening LE pain. Nephrology consulted for CKD. Pt. follows with Dr. Garibay as outpatient. Pt. admitted with SCr. of 4.9 which has trended to 4.7 today. She has 22.6 grams proteinuia. Per [facility] review she was started on Torsemide on last outpatient visit for lower extremity edema. SCr. on 1/11/23 was 4.1 at that time. SCr. was 2.9 in June 2022. Pt. here denies any shortness of breath, states she is urinating well. Has uncontrolled DM, on maintenance steroids for sarcoidosis. Denies any changes in urination. States LE edema is now resolved. Endorses lightheadedness and dizziness especially with standing and thus is afraid to go to the bathroom. Denies any NSAID use.      PAST HISTORY  --------------------------------------------------------------------------------  PAST MEDICAL & SURGICAL HISTORY:  Sarcoidosis      Diabetes      Hypertension      Hyperlipidemia      Chronic kidney disease (CKD), stage III (moderate)      Seizure disorder      No significant past surgical history        FAMILY HISTORY:  Family history of diabetes mellitus in first degree relative    Family history of ESRD      PAST SOCIAL HISTORY:  No toxic habits disclosed.     ALLERGIES & MEDICATIONS  --------------------------------------------------------------------------------  Allergies    No Known Allergies    Intolerances      Standing Inpatient Medications  amLODIPine   Tablet 10 milliGRAM(s) Oral daily  chlorhexidine 4% Liquid 1 Application(s) Topical daily  dextrose 5%. 1000 milliLiter(s) IV Continuous <Continuous>  dextrose 5%. 1000 milliLiter(s) IV Continuous <Continuous>  dextrose 50% Injectable 25 Gram(s) IV Push once  dextrose 50% Injectable 12.5 Gram(s) IV Push once  dextrose 50% Injectable 25 Gram(s) IV Push once  ergocalciferol 53101 Unit(s) Oral <User Schedule>  glucagon  Injectable 1 milliGRAM(s) IntraMuscular once  heparin   Injectable 5000 Unit(s) SubCutaneous every 8 hours  hydrALAZINE 100 milliGRAM(s) Oral three times a day  influenza   Vaccine 0.5 milliLiter(s) IntraMuscular once  insulin glargine Injectable (LANTUS) 32 Unit(s) SubCutaneous at bedtime  insulin lispro (ADMELOG) corrective regimen sliding scale   SubCutaneous three times a day before meals  insulin lispro (ADMELOG) corrective regimen sliding scale   SubCutaneous at bedtime  insulin lispro Injectable (ADMELOG) 12 Unit(s) SubCutaneous three times a day before meals  lidocaine   4% Patch 1 Patch Transdermal every 24 hours  pantoprazole    Tablet 40 milliGRAM(s) Oral before breakfast  predniSONE   Tablet 10 milliGRAM(s) Oral daily  senna 2 Tablet(s) Oral at bedtime  sodium chloride 0.9%. 1000 milliLiter(s) IV Continuous <Continuous>    PRN Inpatient Medications  acetaminophen     Tablet .. 650 milliGRAM(s) Oral every 6 hours PRN  albuterol    90 MICROgram(s) HFA Inhaler 2 Puff(s) Inhalation every 6 hours PRN  dextrose Oral Gel 15 Gram(s) Oral once PRN  HYDROmorphone   Tablet 2 milliGRAM(s) Oral every 4 hours PRN  ondansetron Injectable 4 milliGRAM(s) IV Push every 8 hours PRN  polyethylene glycol 3350 17 Gram(s) Oral two times a day PRN      REVIEW OF SYSTEMS  --------------------------------------------------------------------------------  As per HPI    VITALS/PHYSICAL EXAM  --------------------------------------------------------------------------------  T(C): 36.8 (01-30-23 @ 13:34), Max: 37.1 (01-30-23 @ 12:50)  HR: 111 (01-30-23 @ 13:34) (110 - 118)  BP: 162/93 (01-30-23 @ 13:34) (138/78 - 162/93)  RR: 17 (01-30-23 @ 13:34) (17 - 18)  SpO2: 99% (01-30-23 @ 13:34) (99% - 100%)  Wt(kg): --        01-29-23 @ 07:01  -  01-30-23 @ 07:00  --------------------------------------------------------  IN: 240 mL / OUT: 0 mL / NET: 240 mL      Physical Exam:  	Gen: NAD  	HEENT: MMM  	Pulm: CTA B/L  	CV: S1S2  	Abd: Soft, +BS   	Ext: trace LE edema B/L  	Neuro: Awake  	Skin: Warm and dry  	Vascular access: peripheral     LABS/STUDIES  --------------------------------------------------------------------------------              9.0    7.38  >-----------<  306      [01-30-23 @ 07:10]              28.3     139  |  109  |  46  ----------------------------<  125      [01-30-23 @ 07:10]  4.8   |  21  |  4.77        Ca     8.9     [01-30-23 @ 07:10]      Mg     1.8     [01-30-23 @ 07:10]      Phos  4.5     [01-30-23 @ 07:10]    TPro  5.7  /  Alb  3.1  /  TBili  <0.1  /  DBili  x   /  AST  24  /  ALT  24  /  AlkPhos  43  [01-30-23 @ 07:10]          Creatinine Trend:  SCr 4.77 [01-30 @ 07:10]  SCr 4.56 [01-29 @ 07:01]  SCr 4.89 [01-28 @ 07:29]  SCr 4.60 [01-27 @ 06:38]  SCr 4.90 [01-25 @ 19:38]    Urinalysis - [01-26-23 @ 00:42]      Color Light Yellow / Appearance Clear / SG 1.016 / pH 6.5      Gluc 1000 mg/dL / Ketone Negative  / Bili Negative / Urobili Negative       Blood Moderate / Protein >600 / Leuk Est Negative / Nitrite Negative      RBC 11 / WBC 2 / Hyaline 3 / Gran  / Sq Epi  / Non Sq Epi 2 / Bacteria Negative    Urine Creatinine 32      [01-26-23 @ 13:10]  Urine Protein 724      [01-26-23 @ 13:10]  Urine Sodium 88      [01-26-23 @ 13:10]  Urine Urea Nitrogen 249      [01-26-23 @ 13:14]  Urine Potassium 18      [01-26-23 @ 13:10]  Urine Osmolality 369      [01-26-23 @ 13:10]    Vitamin D (25OH) 7.6      [01-27-23 @ 18:12]  HbA1c 11.2      [08-09-19 @ 21:20]  TSH 1.71      [01-28-23 @ 07:29]    HIV Nonreactive The HIV Ag/Ab Combo test performed screens for HIV-1 p24  antigen, antibodies to HIV-1 (group M and group O), and  antibodies to HIV-2. All specimens repeatedly reactive  will reflex to an HIV 1/2 antibody confirmation and  differentiation test. This assay detects p24 antigen which  may be present prior to the development of HIV antibodies,  therefore a reactive result with a negative HIV 1/2 AB  Confirmation should be followed up with HIV-1 RNA, HIV-2  RNA and repeat testing in 4-8 weeks. A nonreactive result  does not preclude previous exposure to or infection with  HIV-1 or HIV-2. [facility] prohibits disclosure of this  result to any unauthorized party.      [01-29-19 @ 06:30]    dsDNA <12      [01-28-23 @ 07:29]

## 2023-01-30 NOTE — CHART NOTE - NSCHARTNOTEFT_GEN_A_CORE
Nutrition Follow Up Note  Patient seen for: consult for nutrition services assessment and education and HbA1c 10.6%, 1st malnutrition follow up   Chart reviewed, events noted.     Source: [x] Patient       [x] Medical Record        [] RN        [] Family at bedside       [] Other:    -If unable to interview patient: [] Trach/Vent/BiPAP  [] Disoriented/confused/inappropriate to interview    Diet Order:   Diet, Consistent Carbohydrate w/Evening Snack:   Supplement Feeding Modality:  Oral  Glucerna Shake Cans or Servings Per Day:  2       Frequency:  Daily (23)    - Is current order appropriate/adequate? [] Yes  []  No: See recommendations below     - PO intake :   [] >75%  Adequate    [x] 50-75%  Fair       [] <50%  Poor    - Nutrition-related concerns:      - Pt reports fair appetite and PO intake, reports she is consuming ~50% of meals secondary to disliking institutional meals. Reports family brings food from home.       - Pt reports she has not been receiving oral nutritional supplements (currently backordered). RD to change oral nutritional supplements to Green[employer] Trans glucose support 2x/day.       - Ordered for Vitamin D.       - IVF: NaCl 0.9% @ 100 ml/hr.     Endo:       - Pt ordered for Deltasone.       - BG being managed with Lantus and Lispro SSI. BG goal 100-180 mg/dl.       - Pt reports hypoglycemic episode last night. Finger sticks today; 160, 131 mg/dl.       - HbA1c 10.6% (2023), suggests poor glycemic control.     Renal: Chronic kidney disease, unspecified CKD stage. All electrolytes WDL today.     GI:  Pt denies nausea/vomiting and diarrhea/constipation. Last BM: none documented thus far.   Bowel Regimen? [x] Yes   Miralax and Senna       Weights:   Dosin kg   Daily: No daily weights to assess, will continue to monitor weight trends as able.     Nutritionally Pertinent MEDICATIONS  (STANDING):  amLODIPine   Tablet  dextrose 5%.  dextrose 5%.  dextrose 50% Injectable  dextrose 50% Injectable  dextrose 50% Injectable  ergocalciferol  glucagon  Injectable  hydrALAZINE  insulin glargine Injectable (LANTUS)  insulin lispro (ADMELOG) corrective regimen sliding scale  insulin lispro (ADMELOG) corrective regimen sliding scale  insulin lispro Injectable (ADMELOG)  pantoprazole    Tablet  predniSONE   Tablet  senna  sodium chloride 0.9%.    Pertinent Labs:  @ 07:10: Na 139, BUN 46<H>, Cr 4.77<H>, <H>, K+ 4.8, Phos 4.5, Mg 1.8, Alk Phos 43, ALT/SGPT 24, AST/SGOT 24, HbA1c --    A1C with Estimated Average Glucose Result: 10.6 % (23 @ 18:12)  A1C with Estimated Average Glucose Result: 10.6 % (23 @ 06:44)    Finger Sticks:  POCT Blood Glucose.: 131 mg/dL ( @ 12:51)  POCT Blood Glucose.: 160 mg/dL ( @ 08:57)  POCT Blood Glucose.: 89 mg/dL ( @ 20:20)  POCT Blood Glucose.: 157 mg/dL ( @ 17:20)      Skin per nursing documentation: No pressure injuries noted.  Edema per nursing documentation: No edema per flow sheets.     Estimated Needs: Based on reported current weight 51.2 kg   Energy needs: 4519-9240 kcal/kg (28-33 kcal/kg)  Protein needs: 61.44-71.68 g/kg (1.2-1.4 g/kg)  Fluid needs: 7473-5097 ml/kg (28-33 ml/kg)   [x] no change since previous assessment  [] recalculated:     Previous Nutrition Diagnosis: Severe chronic malnutrition  Nutrition Diagnosis is: [x] ongoing  - being addressed with PO intake, oral nutritional supplements, food preferences     Nutrition Care Plan:  [x] In Progress  [] Achieved  [] Not applicable    New Nutrition Diagnosis: Food and Nutrition related knowledge deficit related to limited prior nutrition-related education as evidenced by pt with DM nutrition-related questions.   Goal: Pt to be able to discuss 2 points discussed during following RD visit.     Nutrition Interventions:     Education Provided   [x] Yes:  [] No: The following were discussed with the pt: carbohydrate sources, pairing carbohydrates with proteins at meals, protein sources, portion sizes, balanced plate, consistent carbohydrate intake, relationship of carbs and blood sugar, necessary diet modifications. Written education also provided.     Recommendations:      1) Continue current diet order; consistent carbohydrate diet.   2) Recommend  Vantage[employer] 1.2 Glucose Support 2x/day (to be ordered as provider to RN).   3) Encourage adequate intake of meals and supplements to optimize PO intake.  4) Reinforce DM diet education as needed/able.       Monitoring and Evaluation:   Continue to monitor nutritional intake, tolerance to diet prescription, weights, labs, skin integrity    RD remains available upon request and will follow up per protocol  Faye Peterson RD CDN pager # [phone]

## 2023-01-30 NOTE — PROGRESS NOTE ADULT - ASSESSMENT
52F with sarcoidosis, T2DM A1C 10.6, c/b CKD, neuropathy who presents c/o b/l MSK pains x 1.5 mo and n/v x 1 week. She states that the pains are constant. Denies noticing any erythema, swelling or rash. Pain involves R knee down the shin and L hip. Endocrinology consulted for management of diabetes.    Met with patient and reviewed the following:    -A1c LEVEL: Present and goal  -Blood glucose goals: 100s to 150s as out pt  -Glucose monitoring frequency: ac and hs  -Hypoglycemia prevention,detection and treatment  -Insulin(s) action, time of administration and side effects  -Importance of follow up care- endocrine   Educated patient on current and potential complications of Diabetes (Retinopathy, neuropathy, nephropathy, MI, stroke, delayed wound healing )  Spent over 35 minutes providing face to face education.      Poorly controlled T2DM with hyperglycemia  Steroid Induced hyperglycemia  - HbA1c: 10.6  - Home Regimen:  jardiance 10mg daily, trulicity 0.75mg subcutaneous weekly, basaglar 36 units nightly  - Endocrinologist: managed by primary care doctor  - patient is on prednisone 10mg daily  - bedtime bg tightly controlled x1  due to insulin to carb mismatch at dinner   PLAN  - Hold oral DM agents while inpatient  - Continue lantus 32 units at bedtime. DO NOT HOLD IF NPO.  - Continue Admelog 12 units TID pre-meal. HOLD IF NPO.  - Use moderate dose Admelog correction scale pre-meal  - Use low dose Admelog correction scale at bedtime  - Fingerstick BG before meals and bedtime  - Goal -180  - Carbohydrate consistent diet  - RD consult  - consider pain management consult for neuropathy if pain remains uncontrolled , consider dose adjusted gabapentin if renal function allows  Discharge plan:  - egfr 11 , here with  MIKALA on CKD, recommend discontinuing Jardiance for glycemic management now given low eGFR, however defer to nephrology if medication can be continued or not  - current renal function limits oral medication choices, given current insulin requirements  will need bolus insulin at discharge   - Basaglar 36 units sq qhs  - continue Trulicity to 0.75mg sq once weekly, consider uptitrating as outpatient when n/v improved   - Send Rx for Humalog kwik pen (final dose TBD) TID AC   - Send Rx for glucometer, test strips, lancets, alcohol pads, BD heavenly pen needles.  -At home, Patient should check FSBG premeals and bedtime. Pt should call their doctor when FSBG <70 or above >400 and or consistently above 200s as changes in the regimen will have to be made.  -Recommend outpatient routine dilated eye exam/ophthalmology f/u; podiatry referral and endocrinology     follow up  [facility] [place]  [facility]  [address], [place] [address]  [phone]   *office emailed for f/u appt awaiting detials*       HTN  - Home regimen: amlodipine 10mg daily, losartan 100mg daily, torsemide 10 mg daily  PLAN  - Can check urine microalbumin outpatient  - Outpatient goal BP <130/80. Management per primary team.    HLD  - Home regimen: rosuvastatin 40mg daily  PLAN  - Would likely benefit from a statin if no contraindication  - Can check lipid profile if not done recently    discussed with patient and team Dr Ghosh   Contact via [employer] Teams during business hours  On evenings and weekends (or if no response on [employer] Teams) please call [phone] or page endocrine fellow on call.   For nonurgent matters please email [email or web address]    Please note that this patient may be followed by different provider tomorrow.  Notify endocrine 24 hours prior to discharge for final recommendations    greater than 50% of the encounter was spent counseling and/or coordination of care.  34 minutes spent on total encounter; The necessity of the time spent during the encounter on this date of service was due to development of plan of care/coordination of care/glycemic control through review of labs, blood glucose values and vital signs.  52F with sarcoidosis, T2DM A1C 10.6, c/b CKD, neuropathy who presents c/o b/l MSK pains x 1.5 mo and n/v x 1 week. She states that the pains are constant. Denies noticing any erythema, swelling or rash. Pain involves R knee down the shin and L hip. Endocrinology consulted for management of diabetes.    Met with patient and reviewed the following:    -A1c LEVEL: Present and goal  -Blood glucose goals: 100s to 150s as out pt  -Glucose monitoring frequency: ac and hs  -Hypoglycemia prevention,detection and treatment  -Insulin(s) action, time of administration and side effects  -Importance of follow up care- endocrine   Educated patient on current and potential complications of Diabetes (Retinopathy, neuropathy, nephropathy, MI, stroke, delayed wound healing )  Spent over 35 minutes providing face to face education.      Poorly controlled T2DM with hyperglycemia  Steroid Induced hyperglycemia  - HbA1c: 10.6  - Home Regimen:  jardiance 10mg daily, trulicity 0.75mg subcutaneous weekly, basaglar 36 units nightly  - Endocrinologist: managed by primary care doctor  - patient is on prednisone 10mg daily  - bedtime bg tightly controlled x1  due to insulin to carb mismatch at dinner   PLAN  - Hold oral DM agents while inpatient  - Continue lantus 32 units at bedtime. DO NOT HOLD IF NPO.  - Continue Admelog 12 units TID pre-meal. HOLD IF NPO.  - Use moderate dose Admelog correction scale pre-meal  - Use low dose Admelog correction scale at bedtime  - Fingerstick BG before meals and bedtime  - Goal -180  - Carbohydrate consistent diet  - RD consult  - consider pain management consult for neuropathy if pain remains uncontrolled , consider dose adjusted gabapentin if renal function allows  Discharge plan:  - egfr 11 , here with  MIKALA on CKD, recommend discontinuing Jardiance for glycemic management now given low eGFR, however defer to nephrology if medication can be continued or not  - current renal function limits oral medication choices, given current insulin requirements  will need bolus insulin at discharge   - Basaglar (Final dose TBD)  units sq qhs  - continue Trulicity to 0.75mg sq once weekly, consider uptitrating as outpatient when n/v improved   - Send Rx for Humalog kwik pen (final dose TBD) TID AC   - Send Rx for glucometer, test strips, lancets, alcohol pads, BD heavenly pen needles.  -At home, Patient should check FSBG premeals and bedtime. Pt should call their doctor when FSBG <70 or above >400 and or consistently above 200s as changes in the regimen will have to be made.  -Recommend outpatient routine dilated eye exam/ophthalmology f/u; podiatry referral and endocrinology     follow up  [facility] at [place]  [facility]  [address], [address], [place] [address]  [phone]   *office emailed for f/u appt awaiting detials*       HTN  - Home regimen: amlodipine 10mg daily, losartan 100mg daily, torsemide 10 mg daily  PLAN  - Can check urine microalbumin outpatient  - Outpatient goal BP <130/80. Management per primary team.    HLD  - Home regimen: rosuvastatin 40mg daily  PLAN  - Would likely benefit from a statin if no contraindication  - Can check lipid profile if not done recently    discussed with patient and team Dr Ghosh   Contact via Microsoft Teams during business hours  On evenings and weekends (or if no response on [employer] Teams) please call [phone] or page endocrine fellow on call.   For nonurgent matters please email [email or web address]    Please note that this patient may be followed by different provider tomorrow.  Notify endocrine 24 hours prior to discharge for final recommendations    greater than 50% of the encounter was spent counseling and/or coordination of care.  34 minutes spent on total encounter; The necessity of the time spent during the encounter on this date of service was due to development of plan of care/coordination of care/glycemic control through review of labs, blood glucose values and vital signs.  52F with sarcoidosis, T2DM A1C 10.6, c/b CKD, neuropathy who presents c/o b/l MSK pains x 1.5 mo and n/v x 1 week. She states that the pains are constant. Denies noticing any erythema, swelling or rash. Pain involves R knee down the shin and L hip. Endocrinology consulted for management of diabetes.    Met with patient and reviewed the following:    -A1c LEVEL: Present and goal  -Blood glucose goals: 100s to 150s as out pt  -Glucose monitoring frequency: ac and hs  -Hypoglycemia prevention,detection and treatment  -Insulin(s) action, time of administration and side effects  -Importance of follow up care- endocrine   Educated patient on current and potential complications of Diabetes (Retinopathy, neuropathy, nephropathy, MI, stroke, delayed wound healing )  Spent over 35 minutes providing face to face education.      Poorly controlled T2DM with hyperglycemia  Steroid Induced hyperglycemia  - HbA1c: 10.6  - Home Regimen:  jardiance 10mg daily, trulicity 0.75mg subcutaneous weekly, basaglar 36 units nightly  - Endocrinologist: managed by primary care doctor  - patient is on prednisone 10mg daily  - bedtime bg tightly controlled x1  due to insulin to carb mismatch at dinner   PLAN  - Hold oral DM agents while inpatient  - Continue lantus 32 units at bedtime. DO NOT HOLD IF NPO.  - Continue Admelog 12 units TID pre-meal. HOLD IF NPO.  - Use moderate dose Admelog correction scale pre-meal  - Use low dose Admelog correction scale at bedtime  - Fingerstick BG before meals and bedtime  - Goal -180  - Carbohydrate consistent diet  - RD consult  - consider pain management consult for neuropathy if pain remains uncontrolled , consider dose adjusted gabapentin if renal function allows  Discharge plan:  - egfr 11 , here with  MIKALA on CKD, recommend discontinuing Jardiance for glycemic management now given low eGFR, however defer to nephrology if medication can be continued or not  - current renal function limits oral medication choices, given current insulin requirements  will need bolus insulin at discharge   - Basaglar (Final dose TBD)  units sq qhs  - continue Trulicity to 0.75mg sq once weekly, consider uptitrating as outpatient when n/v improved   - Send Rx for Humalog kwik pen (final dose TBD) TID AC   - Send Rx for glucometer, test strips, lancets, alcohol pads, BD heavenly pen needles.  -At home, Patient should check FSBG premeals and bedtime. Pt should call their doctor when FSBG <70 or above >400 and or consistently above 200s as changes in the regimen will have to be made.  -Recommend outpatient routine dilated eye exam/ophthalmology f/u; podiatry referral and endocrinology     follow up  [facility] at [place]  [facility]  [address], [address], [place] [address]  [phone]   ADD TO DC PAPERS :  NP: 3/3 @2:30PM  Dr. Yessi Soriano ([address] [place] [address]): 6/20@ 2:20PM      HTN  - Home regimen: amlodipine 10mg daily, losartan 100mg daily, torsemide 10 mg daily  PLAN  - Can check urine microalbumin outpatient  - Outpatient goal BP <130/80. Management per primary team.    HLD  - Home regimen: rosuvastatin 40mg daily  PLAN  - Would likely benefit from a statin if no contraindication  - Can check lipid profile if not done recently    discussed with patient and team Dr Ghosh   Contact via [employer] Teams during business hours  On evenings and weekends (or if no response on [employer] Teams) please call [phone] or page endocrine fellow on call.   For nonurgent matters please email [email or web address]    Please note that this patient may be followed by different provider tomorrow.  Notify endocrine 24 hours prior to discharge for final recommendations    greater than 50% of the encounter was spent counseling and/or coordination of care.  34 minutes spent on total encounter; The necessity of the time spent during the encounter on this date of service was due to development of plan of care/coordination of care/glycemic control through review of labs, blood glucose values and vital signs.  52F with sarcoidosis, T2DM A1C 10.6, c/b CKD, neuropathy who presents c/o b/l MSK pains x 1.5 mo and n/v x 1 week. She states that the pains are constant. Denies noticing any erythema, swelling or rash. Pain involves R knee down the shin and L hip. Endocrinology consulted for management of diabetes.    Met with patient and reviewed the following:    -A1c LEVEL: Present and goal  -Blood glucose goals: 100s to 150s as out pt  -Glucose monitoring frequency: ac and hs  -Hypoglycemia prevention,detection and treatment  -Insulin(s) action, time of administration and side effects  -Importance of follow up care- endocrine   Educated patient on current and potential complications of Diabetes (Retinopathy, neuropathy, nephropathy, MI, stroke, delayed wound healing )  Spent over 35 minutes providing face to face education.      Poorly controlled T2DM with hyperglycemia  Steroid Induced hyperglycemia  - HbA1c: 10.6  - Home Regimen:  jardiance 10mg daily, trulicity 0.75mg subcutaneous weekly, basaglar 36 units nightly  - Endocrinologist: managed by primary care doctor  - patient is on prednisone 10mg daily  - bedtime bg tightly controlled x1  due to insulin to carb mismatch at dinner   PLAN  - Hold oral DM agents while inpatient  - Continue lantus 32 units at bedtime. DO NOT HOLD IF NPO.  - Continue Admelog 12 units TID pre-meal. HOLD IF NPO.  - Use moderate dose Admelog correction scale pre-meal  - Use low dose Admelog correction scale at bedtime  - Fingerstick BG before meals and bedtime  - Goal -180  - Carbohydrate consistent diet  - RD consult  - consider pain management consult for neuropathy if pain remains uncontrolled , consider dose adjusted gabapentin if renal function allows  Discharge plan:  - egfr 11 , here with  MIKALA on CKD, recommend discontinuing Jardiance for glycemic management now given low eGFR, however defer to nephrology if medication can be continued or not  - current renal function limits oral medication choices, given current insulin requirements  will need bolus insulin at discharge   - Basaglar (Final dose TBD)  units sq qhs  - continue Trulicity to 0.75mg sq once weekly, consider uptitrating as outpatient when n/v improved - needs new RX  - Send Rx for Humalog kwik pen (final dose TBD) TID AC   - Send Rx for glucometer, test strips, lancets, alcohol pads, BD heavenly pen needles.  -At home, Patient should check FSBG premeals and bedtime. Pt should call their doctor when FSBG <70 or above >400 and or consistently above 200s as changes in the regimen will have to be made.  -Recommend outpatient routine dilated eye exam/ophthalmology f/u; podiatry referral and endocrinology   - uses moshe monitor     follow up  [facility] at [place]  [facility]  [address], [address], [place] [address]  [phone]   ADD TO DC PAPERS :  NP: 3/3 @2:30PM  Dr. Yessi Soriano ([address] [place] [address]): @ 2:20PM      HTN  - Home regimen: amlodipine 10mg daily, losartan 100mg daily, torsemide 10 mg daily  PLAN  - Can check urine microalbumin outpatient  - Outpatient goal BP <130/80. Management per primary team.    HLD  - Home regimen: rosuvastatin 40mg daily  PLAN  - Would likely benefit from a statin if no contraindication  - Can check lipid profile if not done recently    discussed with patient and team Dr Ghosh   Contact via [employer] Teams during business hours  On evenings and weekends (or if no response on [employer] Teams) please call [phone] or page endocrine fellow on call.   For nonurgent matters please email [email or web address]    Please note that this patient may be followed by different provider tomorrow.  Notify endocrine 24 hours prior to discharge for final recommendations    greater than 50% of the encounter was spent counseling and/or coordination of care.  34 minutes spent on total encounter; The necessity of the time spent during the encounter on this date of service was due to development of plan of care/coordination of care/glycemic control through review of labs, blood glucose values and vital signs.     addendum 1749   pt has  freestyle moshe on left arm removed when revisted this evening   provided pt with living with t2dm booklet  pt agreeable to basal bolus + Glp1ra - she does confirm the n/v started only 1 day PTA & she is not always compliant with basal insulin misses it 1-2x week , states compliance to GLP1ra which her PCP started approx 1 month ago - states she needs a new RX

## 2023-01-30 NOTE — PROGRESS NOTE ADULT - SUBJECTIVE AND OBJECTIVE BOX
seen earlier today     Chief Complaint: Type 2 Diabetes Mellitus     INTERVAL HX: pt endorses she ate less than half of dinner yesterday as she was nauseas, received full admelog dose , bedtime BG tightly controlled, pt endorses she had sweating/chills/fatigue when BG 89 . educated pt on pseudohypoglycemia and goal BG while inpt 100-180. counseled on A1C current. observed multiple snacks on bedside tray full container of gummy candy -pt states her grandchild brought this but she is not eating it, container appears closed & pt denies snacking b/w meals . pt c/o bilateral burning sensation in feet which responded to gabapentin in past       Review of Systems:  General: As above  Cardiovascular: No chest pain  Respiratory: No SOB  GI: No nausea, vomiting  Endocrine: no  S&Sx of hypoglycemia    Allergies    No Known Allergies    Intolerances      MEDICATIONS  (STANDING):  amLODIPine   Tablet 10 milliGRAM(s) Oral daily  chlorhexidine 2% Cloths 1 Application(s) Topical daily  dextrose 5%. 1000 milliLiter(s) (100 mL/Hr) IV Continuous <Continuous>  dextrose 5%. 1000 milliLiter(s) (50 mL/Hr) IV Continuous <Continuous>  dextrose 50% Injectable 25 Gram(s) IV Push once  dextrose 50% Injectable 12.5 Gram(s) IV Push once  dextrose 50% Injectable 25 Gram(s) IV Push once  ergocalciferol 69556 Unit(s) Oral <User Schedule>  glucagon  Injectable 1 milliGRAM(s) IntraMuscular once  heparin   Injectable 5000 Unit(s) SubCutaneous every 8 hours  hydrALAZINE 100 milliGRAM(s) Oral three times a day  influenza   Vaccine 0.5 milliLiter(s) IntraMuscular once  insulin glargine Injectable (LANTUS) 32 Unit(s) SubCutaneous at bedtime  insulin lispro (ADMELOG) corrective regimen sliding scale   SubCutaneous three times a day before meals  insulin lispro (ADMELOG) corrective regimen sliding scale   SubCutaneous at bedtime  insulin lispro Injectable (ADMELOG) 12 Unit(s) SubCutaneous three times a day before meals  lidocaine   4% Patch 1 Patch Transdermal every 24 hours  pantoprazole    Tablet 40 milliGRAM(s) Oral before breakfast  predniSONE   Tablet 10 milliGRAM(s) Oral daily  senna 2 Tablet(s) Oral at bedtime  sodium chloride 0.9%. 1000 milliLiter(s) (100 mL/Hr) IV Continuous <Continuous>        insulin glargine Injectable (LANTUS)   32 Unit(s) SubCutaneous (01-29-23 @ 21:08)    insulin lispro (ADMELOG) corrective regimen sliding scale   2 Unit(s) SubCutaneous (01-30-23 @ 09:32)   2 Unit(s) SubCutaneous (01-29-23 @ 17:34)    insulin lispro Injectable (ADMELOG)   12 Unit(s) SubCutaneous (01-30-23 @ 13:09)   12 Unit(s) SubCutaneous (01-30-23 @ 09:32)   12 Unit(s) SubCutaneous (01-29-23 @ 17:34)    predniSONE   Tablet   10 milliGRAM(s) Oral (01-30-23 @ 05:39)        PHYSICAL EXAM:  VITALS: T(C): 37 (01-30-23 @ 06:10)  T(F): 98.6 (01-30-23 @ 06:10), Max: 98.6 (01-30-23 @ 06:10)  HR: 110 (01-30-23 @ 06:10) (110 - 118)  BP: 152/95 (01-30-23 @ 06:10) (138/78 - 152/95)  RR:  (18 - 18)  SpO2:  (100% - 100%)  Wt(kg): --  GENERAL: female laying in bed in NAD  Respiratory: Respirations unlabored   Extremities: Warm, no edema  NEURO: Alert , appropriate     LABS:  POCT Blood Glucose.: 131 mg/dL (01-30-23 @ 12:51)  POCT Blood Glucose.: 160 mg/dL (01-30-23 @ 08:57)  POCT Blood Glucose.: 89 mg/dL (01-29-23 @ 20:20)  POCT Blood Glucose.: 157 mg/dL (01-29-23 @ 17:20)  POCT Blood Glucose.: 169 mg/dL (01-29-23 @ 12:15)  POCT Blood Glucose.: 151 mg/dL (01-29-23 @ 08:22)  POCT Blood Glucose.: 187 mg/dL (01-28-23 @ 21:08)  POCT Blood Glucose.: 118 mg/dL (01-28-23 @ 17:15)  POCT Blood Glucose.: 144 mg/dL (01-28-23 @ 12:17)  POCT Blood Glucose.: 207 mg/dL (01-28-23 @ 08:17)  POCT Blood Glucose.: 169 mg/dL (01-27-23 @ 21:32)  POCT Blood Glucose.: 102 mg/dL (01-27-23 @ 17:27)  POCT Blood Glucose.: 100 mg/dL (01-27-23 @ 17:25)                          9.0    7.38  )-----------( 306      ( 30 Jan 2023 07:10 )             28.3     01-30    139  |  109<H>  |  46<H>  ----------------------------<  125<H>  4.8   |  21<L>  |  4.77<H>    Ca    8.9      30 Jan 2023 07:10  Phos  4.5     01-30  Mg     1.8     01-30    TPro  5.7<L>  /  Alb  3.1<L>  /  TBili  <0.1<L>  /  DBili  x   /  AST  24  /  ALT  24  /  AlkPhos  43  01-30    eGFR: 11 mL/min/1.73m2 (30 Jan 2023 07:10)      Thyroid Function Tests:  01-28 @ 07:29 TSH 1.71 FreeT4 -- T3 -- Anti TPO -- Anti Thyroglobulin Ab -- TSI --      A1C with Estimated Average Glucose Result: 10.6 % (01-27-23 @ 18:12)  A1C with Estimated Average Glucose Result: 10.6 % (01-27-23 @ 06:44)    Estimated Average Glucose: 258 mg/dL (01-27-23 @ 18:12)  Estimated Average Glucose: 258 mg/dL (01-27-23 @ 06:44)        Diet, Consistent Carbohydrate w/Evening Snack:   Supplement Feeding Modality:  Oral  Glucerna Shake Cans or Servings Per Day:  2       Frequency:  Daily (01-26-23 @ 17:13) [Active]  Diet, Consistent Carbohydrate w/Evening Snack:   Supplement Feeding Modality:  Oral  Suplena Cans or Servings Per Day:  2       Frequency:  Daily (01-26-23 @ 16:59) [Pending Verification By Attending]              seen earlier today     Chief Complaint: Type 2 Diabetes Mellitus     INTERVAL HX: pt endorses she ate less than half of dinner yesterday as she was nauseas, received full admelog dose , bedtime BG tightly controlled, pt endorses she had sweating/chills/fatigue when BG 89 . educated pt on pseudohypoglycemia and goal BG while inpt 100-180. counseled on A1C current. observed multiple snacks on bedside tray full container of gummy candy -pt states her grandchild brought this but she is not eating it, container appears closed & pt denies snacking b/w meals . pt c/o bilateral burning sensation in feet which responded to gabapentin in past .       Review of Systems:  General: As above  Cardiovascular: No chest pain  Respiratory: No SOB  GI: No nausea, vomiting  Endocrine: no  S&Sx of hypoglycemia    Allergies    No Known Allergies    Intolerances      MEDICATIONS  (STANDING):  amLODIPine   Tablet 10 milliGRAM(s) Oral daily  chlorhexidine 2% Cloths 1 Application(s) Topical daily  dextrose 5%. 1000 milliLiter(s) (100 mL/Hr) IV Continuous <Continuous>  dextrose 5%. 1000 milliLiter(s) (50 mL/Hr) IV Continuous <Continuous>  dextrose 50% Injectable 25 Gram(s) IV Push once  dextrose 50% Injectable 12.5 Gram(s) IV Push once  dextrose 50% Injectable 25 Gram(s) IV Push once  ergocalciferol 87777 Unit(s) Oral <User Schedule>  glucagon  Injectable 1 milliGRAM(s) IntraMuscular once  heparin   Injectable 5000 Unit(s) SubCutaneous every 8 hours  hydrALAZINE 100 milliGRAM(s) Oral three times a day  influenza   Vaccine 0.5 milliLiter(s) IntraMuscular once  insulin glargine Injectable (LANTUS) 32 Unit(s) SubCutaneous at bedtime  insulin lispro (ADMELOG) corrective regimen sliding scale   SubCutaneous three times a day before meals  insulin lispro (ADMELOG) corrective regimen sliding scale   SubCutaneous at bedtime  insulin lispro Injectable (ADMELOG) 12 Unit(s) SubCutaneous three times a day before meals  lidocaine   4% Patch 1 Patch Transdermal every 24 hours  pantoprazole    Tablet 40 milliGRAM(s) Oral before breakfast  predniSONE   Tablet 10 milliGRAM(s) Oral daily  senna 2 Tablet(s) Oral at bedtime  sodium chloride 0.9%. 1000 milliLiter(s) (100 mL/Hr) IV Continuous <Continuous>        insulin glargine Injectable (LANTUS)   32 Unit(s) SubCutaneous (01-29-23 @ 21:08)    insulin lispro (ADMELOG) corrective regimen sliding scale   2 Unit(s) SubCutaneous (01-30-23 @ 09:32)   2 Unit(s) SubCutaneous (01-29-23 @ 17:34)    insulin lispro Injectable (ADMELOG)   12 Unit(s) SubCutaneous (01-30-23 @ 13:09)   12 Unit(s) SubCutaneous (01-30-23 @ 09:32)   12 Unit(s) SubCutaneous (01-29-23 @ 17:34)    predniSONE   Tablet   10 milliGRAM(s) Oral (01-30-23 @ 05:39)        PHYSICAL EXAM:  VITALS: T(C): 37 (01-30-23 @ 06:10)  T(F): 98.6 (01-30-23 @ 06:10), Max: 98.6 (01-30-23 @ 06:10)  HR: 110 (01-30-23 @ 06:10) (110 - 118)  BP: 152/95 (01-30-23 @ 06:10) (138/78 - 152/95)  RR:  (18 - 18)  SpO2:  (100% - 100%)  Wt(kg): --  GENERAL: female laying in bed in NAD  Respiratory: Respirations unlabored   Extremities: Warm, no edema  NEURO: Alert , appropriate     LABS:  POCT Blood Glucose.: 131 mg/dL (01-30-23 @ 12:51)  POCT Blood Glucose.: 160 mg/dL (01-30-23 @ 08:57)  POCT Blood Glucose.: 89 mg/dL (01-29-23 @ 20:20)  POCT Blood Glucose.: 157 mg/dL (01-29-23 @ 17:20)  POCT Blood Glucose.: 169 mg/dL (01-29-23 @ 12:15)  POCT Blood Glucose.: 151 mg/dL (01-29-23 @ 08:22)  POCT Blood Glucose.: 187 mg/dL (01-28-23 @ 21:08)  POCT Blood Glucose.: 118 mg/dL (01-28-23 @ 17:15)  POCT Blood Glucose.: 144 mg/dL (01-28-23 @ 12:17)  POCT Blood Glucose.: 207 mg/dL (01-28-23 @ 08:17)  POCT Blood Glucose.: 169 mg/dL (01-27-23 @ 21:32)  POCT Blood Glucose.: 102 mg/dL (01-27-23 @ 17:27)  POCT Blood Glucose.: 100 mg/dL (01-27-23 @ 17:25)                          9.0    7.38  )-----------( 306      ( 30 Jan 2023 07:10 )             28.3     01-30    139  |  109<H>  |  46<H>  ----------------------------<  125<H>  4.8   |  21<L>  |  4.77<H>    Ca    8.9      30 Jan 2023 07:10  Phos  4.5     01-30  Mg     1.8     01-30    TPro  5.7<L>  /  Alb  3.1<L>  /  TBili  <0.1<L>  /  DBili  x   /  AST  24  /  ALT  24  /  AlkPhos  43  01-30    eGFR: 11 mL/min/1.73m2 (30 Jan 2023 07:10)      Thyroid Function Tests:  01-28 @ 07:29 TSH 1.71 FreeT4 -- T3 -- Anti TPO -- Anti Thyroglobulin Ab -- TSI --      A1C with Estimated Average Glucose Result: 10.6 % (01-27-23 @ 18:12)  A1C with Estimated Average Glucose Result: 10.6 % (01-27-23 @ 06:44)    Estimated Average Glucose: 258 mg/dL (01-27-23 @ 18:12)  Estimated Average Glucose: 258 mg/dL (01-27-23 @ 06:44)        Diet, Consistent Carbohydrate w/Evening Snack:   Supplement Feeding Modality:  Oral  Glucerna Shake Cans or Servings Per Day:  2       Frequency:  Daily (01-26-23 @ 17:13) [Active]  Diet, Consistent Carbohydrate w/Evening Snack:   Supplement Feeding Modality:  Oral  Suplena Cans or Servings Per Day:  2       Frequency:  Daily (01-26-23 @ 16:59) [Pending Verification By Attending]

## 2023-01-30 NOTE — CONSULT NOTE ADULT - ATTENDING COMMENTS
Amendments to fellow's A/P as above.  Patient aware of our recommendations and in agreement.  Discussed with primary team
Marii Jack MD  Off: [phone]  contact me on teams    (After 5 pm or on weekends please page the on-call fellow/attending, can check [email or web address] for schedule. Login is tianna carney, schedule under [facility] medicine, psych, derm)
Uncontrolled DM2  Agree with basal/bolus plan as outlined, adjust as plan above   d/w pt in detail regarding lifestyle changes with carb controlled diet, monitoring FSBG, exercise  Endocrine team consulted for uncontrolled diabetes. Patient is high risk with high level decision making due to uncontrolled diabetes which places patient at high risk for cardiovascular and cerebrovascular events. Patient with lability of glucose requiring close monitoring and insulin adjustments.

## 2023-01-30 NOTE — CONSULT NOTE ADULT - ASSESSMENT
Pt. is a 52 y.o. F with PMHx. of Sarcoidosis, DM2, HTN and CKD presents for worsening LE pain. Nephrology consulted for CKD.

## 2023-01-30 NOTE — PROGRESS NOTE ADULT - PROBLEM SELECTOR PLAN 7
Diet: CC  DVT: HSQ  Dispo: Pending PT recs, clinical course Diet: CC  DVT: HSQ  Dispo: Pending PT recs, clinical course  Communication: Reached out to daughter w/o response (1/30); #[phone]

## 2023-01-30 NOTE — CONSULT NOTE ADULT - PROBLEM SELECTOR RECOMMENDATION 9
Pt. admitted with SCr. of 4.9 which has trended to 4.7 today. She has 22.6 grams proteinuria. Per [facility] review she was started on Torsemide on last outpatient visit for lower extremity edema. SCr. on 1/11/23 was 4.1 at that time. SCr. was 2.9 in June 2022. Unclear of Patients baseline Cr. Pt. recent start on diuretics likely to have caused elevation in SCr. in addition to uncontrolled diabetes. Renal US consistent with CKD, 8.7 and 9.1 cm kidneys. Dose all medications per eGFR. Would not pursue renal biopsy as this is likely to be 2/2 diabetic nephropathy. Can repeat UA to show resolution of hematuria. Will need outpatient follow up for advanced CKD. Will likely need to plan for RRT. Pt. to think about it further. Check Uric acid. Hold SGLT2i, ARB and Torsemide. Optimize hemodynamics. Avoid urinary retention.    If you have any questions, please feel free to contact me  Lito Oglesby  Nephrology Fellow  [phone]; Prefer [employer] TEAMS  (After 5pm or on weekends please page the on-call fellow) Pt. admitted with SCr. of 4.9 which has trended to 4.7 today. She has 22.6 grams proteinuria. [facility] on last outpatient visit for lower extremity edema. SCr. on 1/11/23 was 4.1 at that time. SCr. was 2.9 in June 2022. Unclear of Patients baseline Cr. Pt. recent start on diuretics likely to have caused elevation in SCr. in addition to uncontrolled diabetes. Renal US consistent with CKD, 8.7 and 9.1 cm kidneys. Dose all medications per eGFR. Would not pursue renal biopsy as this is likely to be 2/2 diabetic nephropathy. Can repeat UA to show resolution of hematuria. Will need outpatient follow up for advanced CKD. Will likely need to plan for RRT. Pt. to think about it further. Check Uric acid. Hold SGLT2i, ARB and Torsemide. Optimize hemodynamics. Avoid urinary retention.    #Anemia: Please check TSAT, HgB below goal  #BMD: Chek calcium and phos daily. Check PTH.     If you have any questions, please feel free to contact me  Lito Oglesby  Nephrology Fellow  [phone]; Prefer [employer] TEAMS  (After 5pm or on weekends please page the on-call fellow)

## 2023-01-30 NOTE — PROGRESS NOTE ADULT - SUBJECTIVE AND OBJECTIVE BOX
Andres Peterson  PGY-1 Resident Physician     Patient is a 52y old  Female who presents with a chief complaint of MIKALA, MSK pains (29 Jan 2023 09:22)      SUBJECTIVE / OVERNIGHT EVENTS:  Not endorsing any pain or GI distress. Denies remaining ROS. Able to tolerate PO diet.     MEDICATIONS  (STANDING):  amLODIPine   Tablet 10 milliGRAM(s) Oral daily  chlorhexidine 2% Cloths 1 Application(s) Topical daily  dextrose 5%. 1000 milliLiter(s) (100 mL/Hr) IV Continuous <Continuous>  dextrose 5%. 1000 milliLiter(s) (50 mL/Hr) IV Continuous <Continuous>  dextrose 50% Injectable 25 Gram(s) IV Push once  dextrose 50% Injectable 12.5 Gram(s) IV Push once  dextrose 50% Injectable 25 Gram(s) IV Push once  ergocalciferol 94933 Unit(s) Oral <User Schedule>  glucagon  Injectable 1 milliGRAM(s) IntraMuscular once  heparin   Injectable 5000 Unit(s) SubCutaneous every 8 hours  hydrALAZINE 100 milliGRAM(s) Oral three times a day  influenza   Vaccine 0.5 milliLiter(s) IntraMuscular once  insulin glargine Injectable (LANTUS) 32 Unit(s) SubCutaneous at bedtime  insulin lispro (ADMELOG) corrective regimen sliding scale   SubCutaneous three times a day before meals  insulin lispro (ADMELOG) corrective regimen sliding scale   SubCutaneous at bedtime  insulin lispro Injectable (ADMELOG) 12 Unit(s) SubCutaneous three times a day before meals  lidocaine   4% Patch 1 Patch Transdermal every 24 hours  pantoprazole    Tablet 40 milliGRAM(s) Oral before breakfast  predniSONE   Tablet 10 milliGRAM(s) Oral daily  senna 2 Tablet(s) Oral at bedtime  sodium chloride 0.9%. 1000 milliLiter(s) (100 mL/Hr) IV Continuous <Continuous>    MEDICATIONS  (PRN):  acetaminophen     Tablet .. 650 milliGRAM(s) Oral every 6 hours PRN Temp greater or equal to 38C (100.4F), Mild Pain (1 - 3)  albuterol    90 MICROgram(s) HFA Inhaler 2 Puff(s) Inhalation every 6 hours PRN Shortness of Breath and/or Wheezing  dextrose Oral Gel 15 Gram(s) Oral once PRN Blood Glucose LESS THAN 70 milliGRAM(s)/deciliter  HYDROmorphone   Tablet 2 milliGRAM(s) Oral every 4 hours PRN Severe Pain (7 - 10)  ondansetron Injectable 4 milliGRAM(s) IV Push every 8 hours PRN Nausea and/or Vomiting  polyethylene glycol 3350 17 Gram(s) Oral two times a day PRN Constipation    Allergies    No Known Allergies    Intolerances        Vital Signs Last 24 Hrs  T(C): 37 (30 Jan 2023 06:10), Max: 37 (30 Jan 2023 06:10)  T(F): 98.6 (30 Jan 2023 06:10), Max: 98.6 (30 Jan 2023 06:10)  HR: 110 (30 Jan 2023 06:10) (107 - 118)  BP: 152/95 (30 Jan 2023 06:10) (138/78 - 154/89)  BP(mean): --  RR: 18 (30 Jan 2023 06:10) (18 - 18)  SpO2: 100% (30 Jan 2023 06:10) (100% - 100%)    Parameters below as of 30 Jan 2023 06:10  Patient On (Oxygen Delivery Method): room air      Daily     Daily   I&O's Summary    29 Jan 2023 07:01  -  30 Jan 2023 07:00  --------------------------------------------------------  IN: 240 mL / OUT: 0 mL / NET: 240 mL        Physical Exam  Gen: in mild distress from nausea and LE pain  HEENT: atraumatic, normocephalic, EOMI  CV: RRR, no murmurs  Resp: normal breath sounds  GI: soft, nontender, nondistended, BS+  MSK: extremities atraumatic, R knee and lower leg TTP diffusely, L hip TTP, no swelling or erythema noted. No edema.  Skin: warm, dry, no rashes or lesions  Neuro: no focal deficits, sensation grossly intact  Psych: alert and oriented x3, appropriate mood and affect    DIAGNOSTICS:                         9.0    7.38  )-----------( 306      ( 30 Jan 2023 07:10 )             28.3     Hgb Trend: 9.0<--, 8.9<--, 9.7<--, 9.6<--, 10.2<--  01-30    139  |  109<H>  |  46<H>  ----------------------------<  125<H>  4.8   |  21<L>  |  4.77<H>    Ca    8.9      30 Jan 2023 07:10  Phos  4.5     01-30  Mg     1.8     01-30    TPro  5.7<L>  /  Alb  3.1<L>  /  TBili  <0.1<L>  /  DBili  x   /  AST  24  /  ALT  24  /  AlkPhos  43  01-30    CAPILLARY BLOOD GLUCOSE      POCT Blood Glucose.: 160 mg/dL (30 Jan 2023 08:57)  POCT Blood Glucose.: 89 mg/dL (29 Jan 2023 20:20)  POCT Blood Glucose.: 157 mg/dL (29 Jan 2023 17:20)  POCT Blood Glucose.: 169 mg/dL (29 Jan 2023 12:15)    Creatinine Trend: 4.77<--, 4.56<--, 4.89<--, 4.60<--, 4.90<--  LIVER FUNCTIONS - ( 30 Jan 2023 07:10 )  Alb: 3.1 g/dL / Pro: 5.7 g/dL / ALK PHOS: 43 U/L / ALT: 24 U/L / AST: 24 U/L / GGT: x

## 2023-01-31 ENCOUNTER — TRANSCRIPTION ENCOUNTER (OUTPATIENT)
Age: 53
End: 2023-01-31

## 2023-01-31 VITALS
RESPIRATION RATE: 17 BRPM | HEART RATE: 111 BPM | SYSTOLIC BLOOD PRESSURE: 164 MMHG | OXYGEN SATURATION: 100 % | DIASTOLIC BLOOD PRESSURE: 93 MMHG | TEMPERATURE: 98 F

## 2023-01-31 DIAGNOSIS — E11.65 TYPE 2 DIABETES MELLITUS WITH HYPERGLYCEMIA: ICD-10-CM

## 2023-01-31 DIAGNOSIS — E11.42 TYPE 2 DIABETES MELLITUS WITH DIABETIC POLYNEUROPATHY: ICD-10-CM

## 2023-01-31 LAB
GLUCOSE BLDC GLUCOMTR-MCNC: 191 MG/DL — HIGH (ref 70–99)
GLUCOSE BLDC GLUCOMTR-MCNC: 265 MG/DL — HIGH (ref 70–99)
IRON SATN MFR SERPL: 18 % — SIGNIFICANT CHANGE UP (ref 14–50)
IRON SATN MFR SERPL: 48 UG/DL — SIGNIFICANT CHANGE UP (ref 30–160)
TIBC SERPL-MCNC: 267 UG/DL — SIGNIFICANT CHANGE UP (ref 220–430)
UIBC SERPL-MCNC: 219 UG/DL — SIGNIFICANT CHANGE UP (ref 110–370)
URATE SERPL-MCNC: 5.5 MG/DL — SIGNIFICANT CHANGE UP (ref 2.5–7)

## 2023-01-31 PROCEDURE — 82652 VIT D 1 25-DIHYDROXY: CPT

## 2023-01-31 PROCEDURE — 97530 THERAPEUTIC ACTIVITIES: CPT

## 2023-01-31 PROCEDURE — 97161 PT EVAL LOW COMPLEX 20 MIN: CPT

## 2023-01-31 PROCEDURE — 99232 SBSQ HOSP IP/OBS MODERATE 35: CPT | Mod: GC

## 2023-01-31 PROCEDURE — 82310 ASSAY OF CALCIUM: CPT

## 2023-01-31 PROCEDURE — 84295 ASSAY OF SERUM SODIUM: CPT

## 2023-01-31 PROCEDURE — 83735 ASSAY OF MAGNESIUM: CPT

## 2023-01-31 PROCEDURE — 82570 ASSAY OF URINE CREATININE: CPT

## 2023-01-31 PROCEDURE — 99239 HOSP IP/OBS DSCHRG MGMT >30: CPT

## 2023-01-31 PROCEDURE — 82330 ASSAY OF CALCIUM: CPT

## 2023-01-31 PROCEDURE — 85014 HEMATOCRIT: CPT

## 2023-01-31 PROCEDURE — 82306 VITAMIN D 25 HYDROXY: CPT

## 2023-01-31 PROCEDURE — 82947 ASSAY GLUCOSE BLOOD QUANT: CPT

## 2023-01-31 PROCEDURE — 87637 SARSCOV2&INF A&B&RSV AMP PRB: CPT

## 2023-01-31 PROCEDURE — 73590 X-RAY EXAM OF LOWER LEG: CPT

## 2023-01-31 PROCEDURE — 76770 US EXAM ABDO BACK WALL COMP: CPT

## 2023-01-31 PROCEDURE — 87640 STAPH A DNA AMP PROBE: CPT

## 2023-01-31 PROCEDURE — 99232 SBSQ HOSP IP/OBS MODERATE 35: CPT

## 2023-01-31 PROCEDURE — 84550 ASSAY OF BLOOD/URIC ACID: CPT

## 2023-01-31 PROCEDURE — 85025 COMPLETE CBC W/AUTO DIFF WBC: CPT

## 2023-01-31 PROCEDURE — 82435 ASSAY OF BLOOD CHLORIDE: CPT

## 2023-01-31 PROCEDURE — 99285 EMERGENCY DEPT VISIT HI MDM: CPT

## 2023-01-31 PROCEDURE — 96374 THER/PROPH/DIAG INJ IV PUSH: CPT

## 2023-01-31 PROCEDURE — 82962 GLUCOSE BLOOD TEST: CPT

## 2023-01-31 PROCEDURE — 84100 ASSAY OF PHOSPHORUS: CPT

## 2023-01-31 PROCEDURE — 83605 ASSAY OF LACTIC ACID: CPT

## 2023-01-31 PROCEDURE — 84133 ASSAY OF URINE POTASSIUM: CPT

## 2023-01-31 PROCEDURE — 84132 ASSAY OF SERUM POTASSIUM: CPT

## 2023-01-31 PROCEDURE — 71045 X-RAY EXAM CHEST 1 VIEW: CPT

## 2023-01-31 PROCEDURE — 85652 RBC SED RATE AUTOMATED: CPT

## 2023-01-31 PROCEDURE — 70450 CT HEAD/BRAIN W/O DYE: CPT | Mod: MA

## 2023-01-31 PROCEDURE — 87040 BLOOD CULTURE FOR BACTERIA: CPT

## 2023-01-31 PROCEDURE — 82803 BLOOD GASES ANY COMBINATION: CPT

## 2023-01-31 PROCEDURE — 85027 COMPLETE CBC AUTOMATED: CPT

## 2023-01-31 PROCEDURE — 82728 ASSAY OF FERRITIN: CPT

## 2023-01-31 PROCEDURE — 83690 ASSAY OF LIPASE: CPT

## 2023-01-31 PROCEDURE — 83970 ASSAY OF PARATHORMONE: CPT

## 2023-01-31 PROCEDURE — 83036 HEMOGLOBIN GLYCOSYLATED A1C: CPT

## 2023-01-31 PROCEDURE — 84540 ASSAY OF URINE/UREA-N: CPT

## 2023-01-31 PROCEDURE — 86038 ANTINUCLEAR ANTIBODIES: CPT

## 2023-01-31 PROCEDURE — 85018 HEMOGLOBIN: CPT

## 2023-01-31 PROCEDURE — 82550 ASSAY OF CK (CPK): CPT

## 2023-01-31 PROCEDURE — 84443 ASSAY THYROID STIM HORMONE: CPT

## 2023-01-31 PROCEDURE — 72125 CT NECK SPINE W/O DYE: CPT | Mod: MA

## 2023-01-31 PROCEDURE — 36415 COLL VENOUS BLD VENIPUNCTURE: CPT

## 2023-01-31 PROCEDURE — 86140 C-REACTIVE PROTEIN: CPT

## 2023-01-31 PROCEDURE — 83935 ASSAY OF URINE OSMOLALITY: CPT

## 2023-01-31 PROCEDURE — 96372 THER/PROPH/DIAG INJ SC/IM: CPT

## 2023-01-31 PROCEDURE — 96375 TX/PRO/DX INJ NEW DRUG ADDON: CPT

## 2023-01-31 PROCEDURE — 84156 ASSAY OF PROTEIN URINE: CPT

## 2023-01-31 PROCEDURE — 82164 ANGIOTENSIN I ENZYME TEST: CPT

## 2023-01-31 PROCEDURE — 84300 ASSAY OF URINE SODIUM: CPT

## 2023-01-31 PROCEDURE — 73562 X-RAY EXAM OF KNEE 3: CPT

## 2023-01-31 PROCEDURE — 80307 DRUG TEST PRSMV CHEM ANLYZR: CPT

## 2023-01-31 PROCEDURE — 81001 URINALYSIS AUTO W/SCOPE: CPT

## 2023-01-31 PROCEDURE — 80053 COMPREHEN METABOLIC PANEL: CPT

## 2023-01-31 PROCEDURE — 87641 MR-STAPH DNA AMP PROBE: CPT

## 2023-01-31 PROCEDURE — 86225 DNA ANTIBODY NATIVE: CPT

## 2023-01-31 PROCEDURE — 83540 ASSAY OF IRON: CPT

## 2023-01-31 PROCEDURE — 97116 GAIT TRAINING THERAPY: CPT

## 2023-01-31 PROCEDURE — 83550 IRON BINDING TEST: CPT

## 2023-01-31 PROCEDURE — 93005 ELECTROCARDIOGRAM TRACING: CPT

## 2023-01-31 RX ORDER — DULAGLUTIDE 4.5 MG/.5ML
1.5 INJECTION, SOLUTION SUBCUTANEOUS
Qty: 1 | Refills: 0
Start: 2023-01-31 | End: 2023-03-01

## 2023-01-31 RX ORDER — ISOPROPYL ALCOHOL, BENZOCAINE .7; .06 ML/ML; ML/ML
1 SWAB TOPICAL
Qty: 100 | Refills: 1
Start: 2023-01-31 | End: 2023-03-21

## 2023-01-31 RX ORDER — CARVEDILOL PHOSPHATE 80 MG/1
1 CAPSULE, EXTENDED RELEASE ORAL
Qty: 30 | Refills: 0
Start: 2023-01-31 | End: 2023-03-01

## 2023-01-31 RX ORDER — INSULIN ASPART 100 [IU]/ML
10 INJECTION, SOLUTION SUBCUTANEOUS
Qty: 1 | Refills: 0
Start: 2023-01-31 | End: 2023-03-01

## 2023-01-31 RX ORDER — HYDRALAZINE HCL 50 MG
1 TABLET ORAL
Qty: 90 | Refills: 0
Start: 2023-01-31 | End: 2023-03-01

## 2023-01-31 RX ORDER — DULAGLUTIDE 4.5 MG/.5ML
0.75 INJECTION, SOLUTION SUBCUTANEOUS
Qty: 0 | Refills: 0 | DISCHARGE

## 2023-01-31 RX ORDER — EMPAGLIFLOZIN 10 MG/1
1 TABLET, FILM COATED ORAL
Qty: 0 | Refills: 0 | DISCHARGE

## 2023-01-31 RX ORDER — GABAPENTIN 400 MG/1
1 CAPSULE ORAL
Qty: 60 | Refills: 0
Start: 2023-01-31 | End: 2023-03-01

## 2023-01-31 RX ORDER — LOSARTAN POTASSIUM 100 MG/1
1 TABLET, FILM COATED ORAL
Qty: 0 | Refills: 0 | DISCHARGE

## 2023-01-31 RX ADMIN — HEPARIN SODIUM 5000 UNIT(S): 5000 INJECTION INTRAVENOUS; SUBCUTANEOUS at 05:38

## 2023-01-31 RX ADMIN — Medication 12 UNIT(S): at 13:49

## 2023-01-31 RX ADMIN — Medication 10 MILLIGRAM(S): at 05:37

## 2023-01-31 RX ADMIN — Medication 100 MILLIGRAM(S): at 13:52

## 2023-01-31 RX ADMIN — PANTOPRAZOLE SODIUM 40 MILLIGRAM(S): 20 TABLET, DELAYED RELEASE ORAL at 05:39

## 2023-01-31 RX ADMIN — HEPARIN SODIUM 5000 UNIT(S): 5000 INJECTION INTRAVENOUS; SUBCUTANEOUS at 13:53

## 2023-01-31 RX ADMIN — AMLODIPINE BESYLATE 10 MILLIGRAM(S): 2.5 TABLET ORAL at 05:38

## 2023-01-31 RX ADMIN — Medication 100 MILLIGRAM(S): at 05:37

## 2023-01-31 RX ADMIN — LIDOCAINE 1 PATCH: 4 CREAM TOPICAL at 05:39

## 2023-01-31 RX ADMIN — Medication 2: at 09:06

## 2023-01-31 RX ADMIN — Medication 12 UNIT(S): at 09:07

## 2023-01-31 RX ADMIN — LIDOCAINE 1 PATCH: 4 CREAM TOPICAL at 12:32

## 2023-01-31 RX ADMIN — CHLORHEXIDINE GLUCONATE 1 APPLICATION(S): 213 SOLUTION TOPICAL at 12:35

## 2023-01-31 RX ADMIN — Medication 6: at 13:48

## 2023-01-31 NOTE — PROGRESS NOTE ADULT - PROBLEM SELECTOR PLAN 4
-XR LE w/o acute findings. Appears to involve both joint and muscles.   > Hold home rosuvastatin  > f/u TSH, CK, ESR, CRP  > consulted rheum for inpt eval  > PRN Lidocaine patch, Dilaudid 2 mg q4h for severe pain
-XR LE w/o acute findings. Appears to involve both joint and muscles.   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
-XR LE w/o acute findings. Appears to involve both joint and muscles.   > Hold home rosuvastatin  > f/u TSH, CK, ESR, CRP  > consulted rheum for inpt eval  > PRN Lidocaine patch, Dilaudid 2 mg q4h for severe pain
-XR LE w/o acute findings. Appears to involve both joint and muscles.   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
-XR LE w/o acute findings. Appears to involve both joint and muscles.   > Hold home rosuvastatin  > f/u TSH, CK, ESR, CRP. Can consider inflammatory myopathy markers if elevated.  > PRN Lidocaine patch, Dilaudid 2 mg q4h for severe pain
-Pt with neuropathy in LEs  -Consider gabapentin renal dose if ok with renal team
-XR LE w/o acute findings. Appears to involve both joint and muscles.   > Hold home rosuvastatin  > f/u TSH, CK, ESR, CRP  > consulted rheum for inpt eval  > PRN Lidocaine patch, Dilaudid 2 mg q4h for severe pain

## 2023-01-31 NOTE — PROGRESS NOTE ADULT - SUBJECTIVE AND OBJECTIVE BOX
Andres Peterson  PGY-1 Resident Physician     Patient is a 52y old  Female who presents with a chief complaint of MIKALA, MSK pains (31 Jan 2023 08:27)      SUBJECTIVE / OVERNIGHT EVENTS:  NAEON. ROS (-): no CP, SOB, dysuria.     MEDICATIONS  (STANDING):  amLODIPine   Tablet 10 milliGRAM(s) Oral daily  chlorhexidine 4% Liquid 1 Application(s) Topical daily  dextrose 5%. 1000 milliLiter(s) (100 mL/Hr) IV Continuous <Continuous>  dextrose 5%. 1000 milliLiter(s) (50 mL/Hr) IV Continuous <Continuous>  dextrose 50% Injectable 25 Gram(s) IV Push once  dextrose 50% Injectable 12.5 Gram(s) IV Push once  dextrose 50% Injectable 25 Gram(s) IV Push once  ergocalciferol 75621 Unit(s) Oral <User Schedule>  glucagon  Injectable 1 milliGRAM(s) IntraMuscular once  heparin   Injectable 5000 Unit(s) SubCutaneous every 8 hours  hydrALAZINE 100 milliGRAM(s) Oral three times a day  influenza   Vaccine 0.5 milliLiter(s) IntraMuscular once  insulin glargine Injectable (LANTUS) 32 Unit(s) SubCutaneous at bedtime  insulin lispro (ADMELOG) corrective regimen sliding scale   SubCutaneous three times a day before meals  insulin lispro (ADMELOG) corrective regimen sliding scale   SubCutaneous at bedtime  insulin lispro Injectable (ADMELOG) 12 Unit(s) SubCutaneous three times a day before meals  lidocaine   4% Patch 1 Patch Transdermal every 24 hours  pantoprazole    Tablet 40 milliGRAM(s) Oral before breakfast  predniSONE   Tablet 10 milliGRAM(s) Oral daily  senna 2 Tablet(s) Oral at bedtime  sodium chloride 0.9%. 1000 milliLiter(s) (100 mL/Hr) IV Continuous <Continuous>    MEDICATIONS  (PRN):  acetaminophen     Tablet .. 650 milliGRAM(s) Oral every 6 hours PRN Temp greater or equal to 38C (100.4F), Mild Pain (1 - 3)  albuterol    90 MICROgram(s) HFA Inhaler 2 Puff(s) Inhalation every 6 hours PRN Shortness of Breath and/or Wheezing  dextrose Oral Gel 15 Gram(s) Oral once PRN Blood Glucose LESS THAN 70 milliGRAM(s)/deciliter  HYDROmorphone   Tablet 2 milliGRAM(s) Oral every 4 hours PRN Severe Pain (7 - 10)  ondansetron Injectable 4 milliGRAM(s) IV Push every 8 hours PRN Nausea and/or Vomiting  polyethylene glycol 3350 17 Gram(s) Oral two times a day PRN Constipation    Allergies    No Known Allergies    Intolerances        Vital Signs Last 24 Hrs  T(C): 37.1 (31 Jan 2023 04:59), Max: 37.1 (30 Jan 2023 12:50)  T(F): 98.8 (31 Jan 2023 04:59), Max: 98.8 (31 Jan 2023 04:59)  HR: 108 (31 Jan 2023 04:59) (108 - 116)  BP: 153/88 (31 Jan 2023 04:59) (144/81 - 162/93)  BP(mean): --  RR: 18 (31 Jan 2023 04:59) (17 - 18)  SpO2: 99% (31 Jan 2023 04:59) (98% - 100%)    Parameters below as of 31 Jan 2023 04:59  Patient On (Oxygen Delivery Method): room air      Daily     Daily   I&O's Summary      Physical Exam  Gen: in mild distress from nausea and LE pain  HEENT: atraumatic, normocephalic, EOMI  CV: RRR, no murmurs  Resp: normal breath sounds  GI: soft, nontender, nondistended, BS+  MSK: extremities atraumatic, R knee and lower leg TTP diffusely, L hip TTP, no swelling or erythema noted. No edema.  Skin: warm, dry, no rashes or lesions  Neuro: no focal deficits, sensation grossly intact  Psych: alert and oriented x3, appropriate mood and affect    DIAGNOSTICS:                         9.0    7.38  )-----------( 306      ( 30 Jan 2023 07:10 )             28.3     Hgb Trend: 9.0<--, 8.9<--, 9.7<--, 9.6<--, 10.2<--  01-30    139  |  109<H>  |  46<H>  ----------------------------<  125<H>  4.8   |  21<L>  |  4.77<H>    Ca    8.9      30 Jan 2023 07:10  Phos  4.5     01-30  Mg     1.8     01-30    TPro  5.7<L>  /  Alb  3.1<L>  /  TBili  <0.1<L>  /  DBili  x   /  AST  24  /  ALT  24  /  AlkPhos  43  01-30    CAPILLARY BLOOD GLUCOSE      POCT Blood Glucose.: 191 mg/dL (31 Jan 2023 08:55)  POCT Blood Glucose.: 222 mg/dL (30 Jan 2023 22:00)  POCT Blood Glucose.: 152 mg/dL (30 Jan 2023 20:45)  POCT Blood Glucose.: 139 mg/dL (30 Jan 2023 17:10)  POCT Blood Glucose.: 131 mg/dL (30 Jan 2023 12:51)    Creatinine Trend: 4.77<--, 4.56<--, 4.89<--, 4.60<--, 4.90<--  LIVER FUNCTIONS - ( 30 Jan 2023 07:10 )  Alb: 3.1 g/dL / Pro: 5.7 g/dL / ALK PHOS: 43 U/L / ALT: 24 U/L / AST: 24 U/L / GGT: x

## 2023-01-31 NOTE — PROGRESS NOTE ADULT - PROBLEM SELECTOR PLAN 6
Diet: CC  DVT: HSQ  Dispo: DC  Communication: D/w daughter via phone by bedside (1/31); #[phone]
> c/w albuterol prn  > c/w prednisone 10 mg daily

## 2023-01-31 NOTE — DISCHARGE NOTE PROVIDER - HOSPITAL COURSE
52F with sarcoidosis, T2DM, and CKD who presents c/o b/l MSK pains x 1.5 mo and n/v x 1 week. She states that the pains are constant. Denies noticing any erythema, swelling or rash. Pain involves R knee down the shin and L hip. FS over past 2wks reviewed, running 200-400s. Denies any urinary sx. Of note, SCr was 4.19 1/11 and was advised by nephrology to cut torsemide from 10 to 5, however pt states pharmacy did not get rx so she has continued to take torsemide 10. In the ED, HR sinus 90-110s and /120s. Labs significant for SCr 4.9 and  mg/dl prot. CTH, c-spine wnl. CXR clear. XR R knee, tib+fib, L hip wnl. S/p 2L IVF and pain medication. Admitted to medicine for further management. of MIKALA, uncontrolled DM, & HTN.     In light of MIKALA, home torsemide & losartan were stopped. Continued w/ amlodipine & hydralazine 100 TID. Sugars were controlled on insulin regimen. Renal and endocrine were consulted for mgmt. Rheumatology c/s for polymyalgia and determined source to be uncontrolled DM neuropathy. During hospital course, DM & HTN were well controlled. Pt at this point will not need dialysis, but will need close Nephrology f/u. Current etiology for MIKALA determined to be pre-renal/intra-renal injury. On Day of d/c patient determined to be HDS.

## 2023-01-31 NOTE — PROGRESS NOTE ADULT - SUBJECTIVE AND OBJECTIVE BOX
DIABETES FOLLOW UP NOTE: Saw pt earlier today    Chief Complaint: Endocrine consult requested for management of T2DM    INTERVAL HX: Pt stable, reports tolerating POs with BG levels variable between 100s to 200s. No hypoglycemia. On Prednisone 10mg for sarcoidosis. Per primary team pt going home today.       Review of Systems:  General: As above  Cardiovascular: No chest pain, palpitations  Respiratory: No SOB, no cough  GI: No nausea, vomiting, abdominal pain  Endocrine: No polyuria, polydipsia or S&Sx of hypoglycemia    Allergies    No Known Allergies    Intolerances      MEDICATIONS:  ergocalciferol 53671 Unit(s) Oral <User Schedule>  insulin glargine Injectable (LANTUS) 32 Unit(s) SubCutaneous at bedtime  insulin lispro (ADMELOG) corrective regimen sliding scale   SubCutaneous at bedtime  insulin lispro (ADMELOG) corrective regimen sliding scale   SubCutaneous three times a day before meals  insulin lispro Injectable (ADMELOG) 12 Unit(s) SubCutaneous three times a day before meals  predniSONE   Tablet 10 milliGRAM(s) Oral daily    PHYSICAL EXAM:  VITALS: T(C): 36.4 (01-31-23 @ 12:35)  T(F): 97.5 (01-31-23 @ 12:35), Max: 98.8 (01-31-23 @ 04:59)  HR: 111 (01-31-23 @ 12:35) (108 - 116)  BP: 164/93 (01-31-23 @ 12:35) (144/81 - 164/93)  RR:  (17 - 18)  SpO2:  (98% - 100%)  Wt(kg): --  GENERAL: Female laying in bed in NAD  Abdomen: Soft, nontender, non distended  Extremities: Warm, no edema in all 4 exts  NEURO: A&O X3    LABS:  POCT Blood Glucose.: 265 mg/dL (01-31-23 @ 13:22)  POCT Blood Glucose.: 191 mg/dL (01-31-23 @ 08:55)  POCT Blood Glucose.: 222 mg/dL (01-30-23 @ 22:00)  POCT Blood Glucose.: 152 mg/dL (01-30-23 @ 20:45)  POCT Blood Glucose.: 139 mg/dL (01-30-23 @ 17:10)  POCT Blood Glucose.: 131 mg/dL (01-30-23 @ 12:51)  POCT Blood Glucose.: 160 mg/dL (01-30-23 @ 08:57)  POCT Blood Glucose.: 89 mg/dL (01-29-23 @ 20:20)  POCT Blood Glucose.: 157 mg/dL (01-29-23 @ 17:20)  POCT Blood Glucose.: 169 mg/dL (01-29-23 @ 12:15)  POCT Blood Glucose.: 151 mg/dL (01-29-23 @ 08:22)  POCT Blood Glucose.: 187 mg/dL (01-28-23 @ 21:08)  POCT Blood Glucose.: 118 mg/dL (01-28-23 @ 17:15)                            9.0    7.38  )-----------( 306      ( 30 Jan 2023 07:10 )             28.3       01-30    139  |  109<H>  |  46<H>  ----------------------------<  125<H>  4.8   |  21<L>  |  4.77<H>    eGFR: 11<L>    Ca    8.9      01-30  Mg     1.8     01-30  Phos  4.5     01-30    TPro  5.7<L>  /  Alb  3.1<L>  /  TBili  <0.1<L>  /  DBili  x   /  AST  24  /  ALT  24  /  AlkPhos  43  01-30      Thyroid Function Tests:  01-28 @ 07:29 TSH 1.71 FreeT4 -- T3 -- Anti TPO -- Anti Thyroglobulin Ab -- TSI --      A1C with Estimated Average Glucose Result: 10.6 % (01-27-23 @ 18:12)  A1C with Estimated Average Glucose Result: 10.6 % (01-27-23 @ 06:44)      Estimated Average Glucose: 258 mg/dL (01-27-23 @ 18:12)  Estimated Average Glucose: 258 mg/dL (01-27-23 @ 06:44)

## 2023-01-31 NOTE — PROGRESS NOTE ADULT - PROBLEM SELECTOR PLAN 5
-Concern for gastroparesis iso uncontrolled DM vs opioid SE. Pt having BM and passing gas. Abd exam benign. Low concern for SBO.  > Zofran PRN
> c/w albuterol prn  > c/w prednisone 10 mg daily

## 2023-01-31 NOTE — DISCHARGE NOTE PROVIDER - NSFOLLOWUPCLINICSTOKEN_GEN_ALL_ED_FT
014916: || ||00\01||False;[id number]: || ||00\01||False; 273330:1 week|| ||00\01||False;120766:1 week|| ||00\01||False;

## 2023-01-31 NOTE — DISCHARGE NOTE NURSING/CASE MANAGEMENT/SOCIAL WORK - NSDCPEFALRISK_GEN_ALL_CORE
For information on Fall & Injury Prevention, visit: [email or web address] OR  [email or web address] OR  https://www.cdc.gov/steadi/patient.html

## 2023-01-31 NOTE — PROGRESS NOTE ADULT - PROBLEM SELECTOR PLAN 2
- Can check urine microalbumin outpatient  - Outpatient goal BP <130/80.   Management per primary team/renal

## 2023-01-31 NOTE — PROGRESS NOTE ADULT - PROBLEM SELECTOR PROBLEM 2
HTN (hypertension)
Diabetes mellitus
Poorly controlled type 2 diabetes mellitus
Diabetes mellitus

## 2023-01-31 NOTE — DISCHARGE NOTE PROVIDER - NSDCFUADDAPPT_GEN_ALL_CORE_FT
Please follow up with your Diabetes Doctor (as listed above) on 3/3.     Please follow up with your nephrologist within 1-2 weeks of discharge.     Please follow up with your PCP within 2 weeks of leaving the hospital.

## 2023-01-31 NOTE — DISCHARGE NOTE PROVIDER - NSDCMRMEDTOKEN_GEN_ALL_CORE_FT
acetaminophen 325 mg oral tablet: 2 tab(s) orally every 6 hours, As needed, Temp greater or equal to 38C (100.4F), Mild Pain (1 - 3), Moderate Pain (4 - 6)  albuterol 90 mcg/inh inhalation aerosol: 2 puff(s) inhaled every 4 hours, As Needed  amLODIPine 10 mg oral tablet: 1 tab(s) orally once a day  Basaglar KwikPen 100 units/mL subcutaneous solution: 36 unit(s) subcutaneous once a day (at bedtime)  diclofenac 1% topical gel: Apply topically to affected area 3 times a day, As Needed  famotidine 20 mg oral tablet: 1 tab(s) orally once a day  fluticasone 50 mcg/inh nasal spray: 1 spray(s) nasal 2 times a day  Jardiance 10 mg oral tablet: 1 tab(s) orally once a day (in the morning)  losartan 100 mg oral tablet: 1 tab(s) orally once a day  predniSONE 10 mg oral tablet: 1 tab(s) orally once a day  rosuvastatin 40 mg oral tablet: 1 tab(s) orally once a day  torsemide 10 mg oral tablet: 1 tab(s) orally once a day  traMADol 50 mg oral tablet: 1 tab(s) orally every 6 hours, As Needed  Trulicity Pen 0.75 mg/0.5 mL subcutaneous solution: 0.75 milligram(s) subcutaneous once a week   acetaminophen 325 mg oral tablet: 2 tab(s) orally every 6 hours, As needed, Temp greater or equal to 38C (100.4F), Mild Pain (1 - 3), Moderate Pain (4 - 6)  albuterol 90 mcg/inh inhalation aerosol: 2 puff(s) inhaled every 4 hours, As Needed  amLODIPine 10 mg oral tablet: 1 tab(s) orally once a day  Basaglar KwikPen 100 units/mL subcutaneous solution: 36 unit(s) subcutaneous once a day (at bedtime)  diclofenac 1% topical gel: Apply topically to affected area 3 times a day, As Needed  famotidine 20 mg oral tablet: 1 tab(s) orally once a day  fluticasone 50 mcg/inh nasal spray: 1 spray(s) nasal 2 times a day  Jardiance 10 mg oral tablet: 1 tab(s) orally once a day (in the morning)  losartan 100 mg oral tablet: 1 tab(s) orally once a day  Please give the patient a rolling walker :   predniSONE 10 mg oral tablet: 1 tab(s) orally once a day  rosuvastatin 40 mg oral tablet: 1 tab(s) orally once a day  torsemide 10 mg oral tablet: 1 tab(s) orally once a day  traMADol 50 mg oral tablet: 1 tab(s) orally every 6 hours, As Needed  Trulicity Pen 0.75 mg/0.5 mL subcutaneous solution: 0.75 milligram(s) subcutaneous once a week   acetaminophen 325 mg oral tablet: 2 tab(s) orally every 6 hours, As needed, Temp greater or equal to 38C (100.4F), Mild Pain (1 - 3), Moderate Pain (4 - 6)  albuterol 90 mcg/inh inhalation aerosol: 2 puff(s) inhaled every 4 hours, As Needed  alcohol swabs : Apply topically to affected area 4 times a day   amLODIPine 10 mg oral tablet: 1 tab(s) orally once a day  Basaglar KwikPen 100 units/mL subcutaneous solution: 36 unit(s) subcutaneous once a day (at bedtime)  carvedilol 12.5 mg oral tablet: 1 tab(s) orally once a day   diclofenac 1% topical gel: Apply topically to affected area 3 times a day, As Needed  dulaglutide 1.5 mg/0.5 mL subcutaneous solution: 1.5 milligram(s) subcutaneously every 7 days   famotidine 20 mg oral tablet: 1 tab(s) orally once a day  fluticasone 50 mcg/inh nasal spray: 1 spray(s) nasal 2 times a day  Freestyle Charlie 2 [place]: Dispense 1 [place]  gabapentin 100 mg oral capsule: 1 cap(s) orally 2 times a day   glucometer (per patient&#x27;s insurance): Test blood sugars four times a day. Dispense #1 glucometer.  hydrALAZINE 100 mg oral tablet: 1 tab(s) orally 3 times a day  Insulin Pen Needles, 4mm: 1 application subcutaneously 4 times a day. ** Use with insulin pen **   lancets: 1 application subcutaneously 4 times a day   NovoLOG 100 units/mL injectable solution: 10 unit(s) injectable 3 times a day   predniSONE 10 mg oral tablet: 1 tab(s) orally once a day  rosuvastatin 40 mg oral tablet: 1 tab(s) orally once a day  test strips (per patient&#x27;s insurance): 1 application subcutaneously 4 times a day. ** Compatible with patient&#x27;s glucometer **  traMADol 50 mg oral tablet: 1 tab(s) orally every 6 hours, As Needed

## 2023-01-31 NOTE — DISCHARGE NOTE NURSING/CASE MANAGEMENT/SOCIAL WORK - PATIENT PORTAL LINK FT
You can access the FollowMyHealth Patient Portal offered by [facility] by registering at the following website: [email or web address]. By joining [facility]’s FollowMyHealth portal, you will also be able to view your health information using other applications (apps) compatible with our system.

## 2023-01-31 NOTE — PROGRESS NOTE ADULT - PROBLEM SELECTOR PROBLEM 5
Nausea & vomiting
Sarcoidosis
Nausea & vomiting

## 2023-01-31 NOTE — DISCHARGE NOTE PROVIDER - NSDCFUSCHEDAPPT_GEN_ALL_CORE_FT
Cheri Corona  [facility]  [address]  Scheduled Appointment: 03/03/2023

## 2023-01-31 NOTE — PROGRESS NOTE ADULT - ATTENDING COMMENTS
-C/w hydralazine, amlodipine, and carvedilol added for BP control.   -F/u final endo recs for DCP.   -Outpatient follow up with endo, renal, and PMD.   -36 minutes spent on the DC process.   -Discussed with patient and daughter at bedside.

## 2023-01-31 NOTE — DISCHARGE NOTE PROVIDER - NSDCCPCAREPLAN_GEN_ALL_CORE_FT
PRINCIPAL DISCHARGE DIAGNOSIS  Diagnosis: Acute kidney injury superimposed on CKD  Assessment and Plan of Treatment: It was noted that your kidney number was elevated upon admission. It was determined that uncontrolled diabetes & hypertension were contributing to acute kidney damage and causing neuropathic pain. You will need to follow up with your kidney doctor, while also being strict with your BP & diabetes medications.       PRINCIPAL DISCHARGE DIAGNOSIS  Diagnosis: Acute kidney injury superimposed on CKD  Assessment and Plan of Treatment: It was noted that your kidney number was elevated upon admission. It was determined that uncontrolled diabetes & hypertension were contributing to acute kidney damage and causing neuropathic pain. You will need to follow up with your kidney doctor, while also being strict with your BP & diabetes medications. Please follow up with nephrologist, endocrinologist, primary care physician within 1 week of discharge from the hospital.

## 2023-01-31 NOTE — PROGRESS NOTE ADULT - PROBLEM SELECTOR PROBLEM 3
HLD (hyperlipidemia)
HTN (hypertension)

## 2023-01-31 NOTE — PROGRESS NOTE ADULT - NSPROGADDITIONALINFOA_GEN_ALL_CORE
-Plan discussed with pt/team.  Contact info: [phone] (24/7). pager [phone]  [email or web address] password [email or web address]  Teams  Spent 30 minutes providing face to face education as well as assessing  pt/labs/meds and discussing discharge plan with primary team/pt  Adjusting insulin  Discharge plan  Follow up care

## 2023-01-31 NOTE — PROGRESS NOTE ADULT - ASSESSMENT
52F w/h/o uncontrolled T2DM (A1C 10.6%) on Basaglar 36 units q hs plus Trulicity 0.75mg started over a month ago and Jardiance also started a month ago. Novolog ac meals stopped when new DM meds were started a month ago. DM c/b CKD and neuropathy. Also h/o HTN/HLD. On Prednisone 10mg daily for sarcoidosis. Here with b/l MSK pains x 1.5 mo and n/v x 1 week> now improved > still has neuropathic pain in LEs> was on Gabapentin but ran out ot it. Endocrinology consulted for management of diabetes. Tolerating POs with BG levels variable between 100s to 200s while on present insulin doses. Will continue to adjust insulin to BG goal 100 to 180s. Per primary team, pt going home today> spoke to team and gave recs. Noted last GFR 11.     Discussed plan of care with pt.     Home Regimen:  jardiance 10mg daily, trulicity 0.75mg subcutaneous weekly, basaglar 36 units nightly

## 2023-01-31 NOTE — PROGRESS NOTE ADULT - PROBLEM SELECTOR PLAN 3
-Home: amlodipine 5 mg daily, losartan 100 mg daily  > increase amlodipine 10 daily  > increase hydral 100 TID  > hold home losartan iso MIKALA
-On rosuvastatin 40mg daily at home. Restart as out pt in not contraindicated  - Follow up levels as out pt
-Home: amlodipine 5 mg daily, losartan 100 mg daily  > increase amlodipine 10 daily  > increase hydral 100 TID  > hold home losartan iso MIKALA  > will discuss r/s home hypertensive in light of MIKALA
-Home: amlodipine 5 mg daily, losartan 100 mg daily  > increase amlodipine 10 daily  > increase hydral 20 TID  > hold home losartan iso MIKALA

## 2023-01-31 NOTE — PROGRESS NOTE ADULT - NUTRITIONAL ASSESSMENT
This patient has been assessed with a concern for Malnutrition and has been determined to have a diagnosis/diagnoses of Severe protein-calorie malnutrition.    This patient is being managed with:   Diet Consistent Carbohydrate w/Evening Snack-  Supplement Feeding Modality:  Oral  Glucerna Shake Cans or Servings Per Day:  2       Frequency:  Daily  Entered: Jan 26 2023  5:13PM    Diet Consistent Carbohydrate w/Evening Snack-  Supplement Feeding Modality:  Oral  Suplena Cans or Servings Per Day:  2       Frequency:  Daily  Entered: Jan 26 2023  4:59PM    The following pending diet order is being considered for treatment of Severe protein-calorie malnutrition:null
This patient has been assessed with a concern for Malnutrition and has been determined to have a diagnosis/diagnoses of Severe protein-calorie malnutrition.    This patient is being managed with:   Diet Consistent Carbohydrate w/Evening Snack-  Supplement Feeding Modality:  Oral  Glucerna Shake Cans or Servings Per Day:  2       Frequency:  Daily  Entered: Jan 26 2023  5:13PM    Diet Consistent Carbohydrate w/Evening Snack-  Supplement Feeding Modality:  Oral  Suplena Cans or Servings Per Day:  2       Frequency:  Daily  Entered: Jan 26 2023  4:59PM    The following pending diet order is being considered for treatment of Severe protein-calorie malnutrition:null
Diet, Consistent Carbohydrate w/Evening Snack:   Supplement Feeding Modality:  Oral  Glucerna Shake Cans or Servings Per Day:  2       Frequency:  Daily (01-26-23 @ 17:13) [Active]    Please see RD assessment and/or follow up.  Managed by primary team as well
This patient has been assessed with a concern for Malnutrition and has been determined to have a diagnosis/diagnoses of Severe protein-calorie malnutrition.    This patient is being managed with:   Diet Consistent Carbohydrate w/Evening Snack-  Supplement Feeding Modality:  Oral  Glucerna Shake Cans or Servings Per Day:  2       Frequency:  Daily  Entered: Jan 26 2023  5:13PM    Diet Consistent Carbohydrate w/Evening Snack-  Supplement Feeding Modality:  Oral  Suplena Cans or Servings Per Day:  2       Frequency:  Daily  Entered: Jan 26 2023  4:59PM    The following pending diet order is being considered for treatment of Severe protein-calorie malnutrition:null

## 2023-01-31 NOTE — PROGRESS NOTE ADULT - PROBLEM SELECTOR PROBLEM 1
Uncontrolled type 2 diabetes mellitus with hyperglycemia, with long-term current use of insulin
Chronic kidney disease, unspecified CKD stage
HLD (hyperlipidemia)
Acute kidney injury superimposed on CKD

## 2023-01-31 NOTE — DISCHARGE NOTE PROVIDER - NSFOLLOWUPCLINICS_GEN_ALL_ED_FT
[facility]  Nephrology  [address] [place] [address], [address] [place] [address]  Phone: [phone]  Fax:     [facility] [place]  Internal Medicine  [address] [place] [address]  Phone: [phone]  Fax: [phone]     [facility]  Nephrology  [address] [place] [address], [address]nd [place] [address]  Phone: [phone]  Fax:   Follow Up Time: 1 week    [facility] [place]  Internal Medicine  [address] [place] [address]  Phone: [phone]  Fax: [phone]  Follow Up Time: 1 week

## 2023-01-31 NOTE — PROGRESS NOTE ADULT - PROBLEM SELECTOR PROBLEM 4
Polymyalgia
Peripheral sensory neuropathy due to type 2 diabetes mellitus
Steroid-induced hyperglycemia
Polymyalgia

## 2023-01-31 NOTE — PROGRESS NOTE ADULT - PROBLEM SELECTOR PLAN 1
-Baseline SCr 2.9 11/2022. Increased to 4.19 1/11 and now 4.9 on admission. Suspected pre-renal injury.   -2/2 poor PO intake, uncontrolled HTN and DM  -Renal US w/o acute pathology  -Noted FeUrea (71%) indicative of intrinsic kidney damage  > holding home torsemide and losartan  > avoid nephrotoxins, renally dose medications  > consider renal c/s in light of increasing Cr
Pt. admitted with SCr. of 4.9 which has trended to 4.7 on day of consult. Awaiting labs for today. She has 22.6 grams proteinuria. Per Eusebio BERNAL review she was started on Torsemide on last outpatient visit for lower extremity edema. SCr. on 1/11/23 was 4.1 at that time. SCr. was 2.9 in June 2022. Unclear of Patients baseline Cr. Pt. recent start on diuretics likely to have caused elevation in SCr. in addition to uncontrolled diabetes. Renal US consistent with CKD, 8.7 and 9.1 cm kidneys. Dose all medications per eGFR. Would not pursue renal biopsy as this is likely to be 2/2 diabetic nephropathy. Can repeat UA to show resolution of hematuria. Will need outpatient follow up for advanced CKD. Will likely need to plan for RRT as outpatient. No current urgent indication for dialysis. Check Uric acid. Hold SGLT2i, ARB and Torsemide. Optimize hemodynamics. Avoid urinary retention.    #Anemia: Please check TSAT, HgB below goal  #BMD: Chek calcium and phos daily. Check PTH.     If you have any questions, please feel free to contact me  Lito Oglesby  Nephrology Fellow  [phone]; Prefer [employer] TEAMS  (After 5pm or on weekends please page the on-call fellow).
-Baseline SCr 2.9 11/2022. Increased to 4.19 1/11 and now 4.9 on admission. Suspected pre-renal injury.   -2/2 poor PO intake, uncontrolled HTN and DM  -Renal US w/o acute pathology  -Noted FeUrea (71%) indicative of intrinsic kidney damage  > holding home torsemide and losartan  > avoid nephrotoxins, renally dose medications  > IVF
-Baseline SCr 2.9 11/2022. Increased to 4.19 1/11 and now 4.9 on admission. Suspected pre-renal injury.   -2/2 poor PO intake, uncontrolled HTN and DM  > holding home torsemide and losartan  > avoid nephrotoxins, renally dose medications  > obtain urine studies, calculate FeUrea (d/t recent diuretic use)  > F/u Renal US  > started 1L LR for suspected pre-renal injury
-Baseline SCr 2.9 11/2022. Increased to 4.19 1/11 and now 4.9 on admission. Suspected pre-renal injury.   -2/2 poor PO intake, uncontrolled HTN and DM  -Renal US w/o acute pathology  -Noted FeUrea (71%) indicative of intrinsic kidney damage  > holding home torsemide and losartan  > avoid nephrotoxins, renally dose medications  > F/u Renal US  > IVF
-Test BG ac and hs  -Increase lantus dose to 34 units at bedtime. DO NOT HOLD IF NPO.  - Continue Admelog 12 units TID pre-meal for now. HOLD IF NPO.  - C/w moderate dose Admelog correction scale pre-meal and at bedtime  -going home today  Discharge plan:  - Basaglar 36 units q hs (home dose) plus increase Trulicity dose to 1.5mg since 0.75mg is not the therapeutic dose, plus Novolog 10 units ac meals.   -Discontinue Jardiance due to worsening CKD with GFR of 11. Can reevaluate as out pt with f/u GFR   -Pt has Freestyle Charlie CGM but doesn't have glucose meter so write rx for glucose meter, strips and lancets. Pt instructed to confirm BG <100s or >250 with POC testing until BGs are more stable to rely on CGM alone  -F/u at endo office  [address] [place] [address] [address]. Phone . Has NP appt for 3/3/23 at 2:30pm and with Dr Christie Dickson 6/2/23 at 2:20pm.   - Recommend routine outpatient ophthalmology, podiatry
-Baseline SCr 2.9 11/2022. Increased to 4.19 1/11 and now 4.9 on admission. Suspected pre-renal injury.   -2/2 poor PO intake, uncontrolled HTN and DM  -Renal US w/o acute pathology  -Noted FeUrea (71%) indicative of intrinsic kidney damage  > holding home torsemide and losartan  > avoid nephrotoxins, renally dose medications  > F/u Renal US  > started 1L LR for suspected pre-renal injury
-Baseline SCr 2.9 11/2022. Increased to 4.19 1/11 and now 4.9 on admission. Suspected pre-renal injury.   -2/2 poor PO intake, uncontrolled HTN and DM  -Renal US w/o acute pathology  -Noted FeUrea (71%) indicative of intrinsic kidney damage  > holding home torsemide and losartan  > avoid nephrotoxins, renally dose medications  > consider renal c/s in light of increasing Cr

## 2023-01-31 NOTE — PROGRESS NOTE ADULT - REASON FOR ADMISSION
MIKALA, MSK pains

## 2023-01-31 NOTE — PROGRESS NOTE ADULT - SUBJECTIVE AND OBJECTIVE BOX
[facility] DIVISION OF KIDNEY DISEASES AND HYPERTENSION -- FOLLOW UP NOTE  --------------------------------------------------------------------------------  HPI:  Pt. is a 52 y.o. F with PMHx. of Sarcoidosis, DM2, HTN and CKD presents for worsening LE pain. Nephrology consulted for CKD. Pt. follows with Dr. Garibay as outpatient. Pt. admitted with SCr. of 4.9 which has trended to 4.7on day of consult. She has 22.6 grams proteinuia. Per [facility] review she was started on Torsemide on last outpatient visit for lower extremity edema. SCr. on 1/11/23 was 4.1 at that time. SCr. was 2.9 in June 2022. Pt. here denies any shortness of breath, states she is urinating well. Has uncontrolled DM, on maintenance steroids for sarcoidosis. Denies any changes in urination. States LE edema is now resolved. Endorses lightheadedness and dizziness especially with standing and thus is afraid to go to the bathroom. Denies any NSAID use.    Pt. seen this AM. Denies any acute complaints. Awaiting discharge.       PAST HISTORY  --------------------------------------------------------------------------------  No significant changes to PMH, PSH, FHx, SHx, unless otherwise noted    ALLERGIES & MEDICATIONS  --------------------------------------------------------------------------------  Allergies    No Known Allergies    Intolerances      Standing Inpatient Medications  amLODIPine   Tablet 10 milliGRAM(s) Oral daily  chlorhexidine 4% Liquid 1 Application(s) Topical daily  dextrose 5%. 1000 milliLiter(s) IV Continuous <Continuous>  dextrose 5%. 1000 milliLiter(s) IV Continuous <Continuous>  dextrose 50% Injectable 25 Gram(s) IV Push once  dextrose 50% Injectable 12.5 Gram(s) IV Push once  dextrose 50% Injectable 25 Gram(s) IV Push once  ergocalciferol 43115 Unit(s) Oral <User Schedule>  glucagon  Injectable 1 milliGRAM(s) IntraMuscular once  heparin   Injectable 5000 Unit(s) SubCutaneous every 8 hours  hydrALAZINE 100 milliGRAM(s) Oral three times a day  influenza   Vaccine 0.5 milliLiter(s) IntraMuscular once  insulin glargine Injectable (LANTUS) 32 Unit(s) SubCutaneous at bedtime  insulin lispro (ADMELOG) corrective regimen sliding scale   SubCutaneous three times a day before meals  insulin lispro (ADMELOG) corrective regimen sliding scale   SubCutaneous at bedtime  insulin lispro Injectable (ADMELOG) 12 Unit(s) SubCutaneous three times a day before meals  lidocaine   4% Patch 1 Patch Transdermal every 24 hours  pantoprazole    Tablet 40 milliGRAM(s) Oral before breakfast  predniSONE   Tablet 10 milliGRAM(s) Oral daily  senna 2 Tablet(s) Oral at bedtime  sodium chloride 0.9%. 1000 milliLiter(s) IV Continuous <Continuous>    PRN Inpatient Medications  acetaminophen     Tablet .. 650 milliGRAM(s) Oral every 6 hours PRN  albuterol    90 MICROgram(s) HFA Inhaler 2 Puff(s) Inhalation every 6 hours PRN  dextrose Oral Gel 15 Gram(s) Oral once PRN  HYDROmorphone   Tablet 2 milliGRAM(s) Oral every 4 hours PRN  ondansetron Injectable 4 milliGRAM(s) IV Push every 8 hours PRN  polyethylene glycol 3350 17 Gram(s) Oral two times a day PRN      REVIEW OF SYSTEMS  --------------------------------------------------------------------------------  As per HPI    VITALS/PHYSICAL EXAM  --------------------------------------------------------------------------------  T(C): 37.1 (01-31-23 @ 04:59), Max: 37.1 (01-30-23 @ 12:50)  HR: 108 (01-31-23 @ 04:59) (108 - 116)  BP: 153/88 (01-31-23 @ 04:59) (144/81 - 162/93)  RR: 18 (01-31-23 @ 04:59) (17 - 18)  SpO2: 99% (01-31-23 @ 04:59) (98% - 100%)  Wt(kg): --      Physical Exam:  	Gen: NAD  	HEENT: MMM  	Pulm: CTA B/L  	CV: S1S2  	Abd: Soft, +BS   	Ext: trace LE edema B/L  	Neuro: Awake  	Skin: Warm and dry  	Vascular access: peripheral       LABS/STUDIES  --------------------------------------------------------------------------------              9.0    7.38  >-----------<  306      [01-30-23 @ 07:10]              28.3     139  |  109  |  46  ----------------------------<  125      [01-30-23 @ 07:10]  4.8   |  21  |  4.77        Ca     8.9     [01-30-23 @ 07:10]      Mg     1.8     [01-30-23 @ 07:10]      Phos  4.5     [01-30-23 @ 07:10]    TPro  5.7  /  Alb  3.1  /  TBili  <0.1  /  DBili  x   /  AST  24  /  ALT  24  /  AlkPhos  43  [01-30-23 @ 07:10]          Creatinine Trend:  SCr 4.77 [01-30 @ 07:10]  SCr 4.56 [01-29 @ 07:01]  SCr 4.89 [01-28 @ 07:29]  SCr 4.60 [01-27 @ 06:38]  SCr 4.90 [01-25 @ 19:38]    Urinalysis - [01-26-23 @ 00:42]      Color Light Yellow / Appearance Clear / SG 1.016 / pH 6.5      Gluc 1000 mg/dL / Ketone Negative  / Bili Negative / Urobili Negative       Blood Moderate / Protein >600 / Leuk Est Negative / Nitrite Negative      RBC 11 / WBC 2 / Hyaline 3 / Gran  / Sq Epi  / Non Sq Epi 2 / Bacteria Negative    Urine Creatinine 32      [01-26-23 @ 13:10]  Urine Protein 724      [01-26-23 @ 13:10]  Urine Sodium 88      [01-26-23 @ 13:10]  Urine Urea Nitrogen 249      [01-26-23 @ 13:14]  Urine Potassium 18      [01-26-23 @ 13:10]  Urine Osmolality 369      [01-26-23 @ 13:10]    Vitamin D (25OH) 7.6      [01-27-23 @ 18:12]  HbA1c 11.2      [08-09-19 @ 21:20]  TSH 1.71      [01-28-23 @ 07:29]      dsDNA <12      [01-28-23 @ 07:29]

## 2023-01-31 NOTE — PROGRESS NOTE ADULT - PROVIDER SPECIALTY LIST ADULT
Endocrinology
Internal Medicine
Internal Medicine
Nephrology
Endocrinology
Internal Medicine

## 2023-01-31 NOTE — PROGRESS NOTE ADULT - ATTENDING COMMENTS
CKD advanced stage 4/5  Will need close followup in [facility] nephrology clinic on [facility] with Dr Garibay  Importance of DM and HTN control discussed with pt    Marii Jack MD  Off: [phone]  contact me on teams    (After 5 pm or on weekends please page the on-call fellow/attending, can check [email or web address] for schedule. Login is tianna carney, schedule under [facility] medicine, psych, derm)

## 2023-02-01 ENCOUNTER — NON-APPOINTMENT (OUTPATIENT)
Age: 53
End: 2023-02-01

## 2023-02-01 LAB
CALCIUM SERPL-MCNC: 8.1 MG/DL — LOW (ref 8.4–10.5)
CULTURE RESULTS: SIGNIFICANT CHANGE UP
CULTURE RESULTS: SIGNIFICANT CHANGE UP
ERYTHROCYTE [SEDIMENTATION RATE] IN BLOOD: 2 MM/HR — SIGNIFICANT CHANGE UP (ref 0–20)
FERRITIN SERPL-MCNC: 58 NG/ML — SIGNIFICANT CHANGE UP (ref 15–150)
PTH-INTACT FLD-MCNC: 98 PG/ML — HIGH (ref 15–65)
SPECIMEN SOURCE: SIGNIFICANT CHANGE UP
SPECIMEN SOURCE: SIGNIFICANT CHANGE UP

## 2023-02-02 ENCOUNTER — APPOINTMENT (OUTPATIENT)
Dept: INTERNAL MEDICINE | Facility: CLINIC | Age: 53
End: 2023-02-02
Payer: MEDICARE

## 2023-02-02 ENCOUNTER — APPOINTMENT (OUTPATIENT)
Dept: INTERNAL MEDICINE | Facility: CLINIC | Age: 53
End: 2023-02-02

## 2023-02-02 ENCOUNTER — OUTPATIENT (OUTPATIENT)
Dept: OUTPATIENT SERVICES | Facility: HOSPITAL | Age: 53
LOS: 1 days | End: 2023-02-02
Payer: MEDICARE

## 2023-02-02 DIAGNOSIS — I10 ESSENTIAL (PRIMARY) HYPERTENSION: ICD-10-CM

## 2023-02-02 LAB — ANA TITR SER: NEGATIVE — SIGNIFICANT CHANGE UP

## 2023-02-02 PROCEDURE — G0463: CPT

## 2023-02-02 PROCEDURE — 99443: CPT | Mod: 95

## 2023-02-03 ENCOUNTER — NON-APPOINTMENT (OUTPATIENT)
Age: 53
End: 2023-02-03

## 2023-02-03 LAB — G-OH-BUTYR BLD-MCNC: NEGATIVE — SIGNIFICANT CHANGE UP

## 2023-02-03 RX ORDER — EMPAGLIFLOZIN 10 MG/1
10 TABLET, FILM COATED ORAL DAILY
Qty: 30 | Refills: 2 | Status: COMPLETED | COMMUNITY
Start: 2022-06-13 | End: 2023-02-03

## 2023-02-03 RX ORDER — DOCUSATE SODIUM 100 MG/1
100 CAPSULE ORAL 3 TIMES DAILY
Qty: 90 | Refills: 6 | Status: COMPLETED | COMMUNITY
Start: 2018-03-05 | End: 2023-02-03

## 2023-02-03 RX ORDER — BLOOD-GLUCOSE METER
KIT MISCELLANEOUS
Qty: 1 | Refills: 0 | Status: COMPLETED | COMMUNITY
Start: 2021-08-09 | End: 2023-02-03

## 2023-02-03 RX ORDER — MULTIVIT-MIN/FOLIC/VIT K/LYCOP 400-300MCG
50 MCG TABLET ORAL
Qty: 90 | Refills: 3 | Status: COMPLETED | COMMUNITY
Start: 2018-04-12 | End: 2023-02-03

## 2023-02-03 RX ORDER — TORSEMIDE 5 MG/1
5 TABLET ORAL DAILY
Qty: 30 | Refills: 3 | Status: COMPLETED | COMMUNITY
Start: 2023-01-12 | End: 2023-02-03

## 2023-02-03 RX ORDER — BLOOD-GLUCOSE METER
KIT MISCELLANEOUS
Qty: 1 | Refills: 0 | Status: ACTIVE | COMMUNITY
Start: 2019-08-09 | End: 1900-01-01

## 2023-02-03 RX ORDER — LOSARTAN POTASSIUM 100 MG/1
100 TABLET, FILM COATED ORAL
Qty: 90 | Refills: 3 | Status: COMPLETED | COMMUNITY
Start: 2019-08-09 | End: 2023-02-03

## 2023-02-03 NOTE — PLAN
[FreeTextEntry1] : Patient is a 52F with sarcoidosis, T2DM, and CKD presents for TEB for post-hospitalization visit. Discharged from hospital yesterday. \par \par #T2DM\par -New home regimen of Basaglar 36U at bedtime, Novolog 10 units with meals, Trulicity 1.5 mg/weekly\par -Stopped Jardiance\par -Counselled to check FSG from [employer] Charlie fasting, premeal and bedtime. Told to write down sugars but can also see when patient comes in clinic\par -Appt with Endo in March and June\par \par #CKD\par -2/2 Diabetic nephropathy\par -Off torsemide and losartan\par -Has nephrology appt in May, will task Kia if appt can be pushed up\par \par #HTN\par -On Hydralazine 100 mg TID, Amlodipine 10 mg, Carvedilol 12.5 mg QD\par -Prescribe BP cuff\par -Instructed patient to document BPs and bring into visit if uptitration needed\par \par #Pain control\par -Discharged with Tylenol 650 mg TID for mild pain, Tramadol 50 mg q6-8 for severe pain\par -Say pain controlled with Tylenol for now\par \par RTC clinic in 5 weeks for BP, DM check

## 2023-02-03 NOTE — HISTORY OF PRESENT ILLNESS
[Home] : at home, [unfilled] , at the time of the visit. [Medical Office: ([facility])___] : at the medical office located in  [Verbal consent obtained from patient] : the patient, [unfilled] [FreeTextEntry1] : Post hospitalization follow-up [de-identified] : Patient is a 52F with sarcoidosis, T2DM, and CKD presents for TEB for post-hospitalization visit. Discharged from hospital yesterday. \par \par Patient presented to [facility] ED for b/l MSK pains x 1.5 mo and n/v x 1 week, found to have MIKALA on CKD, hyperglycemic to 300s with /120. Labs significant for SCr 4.9, up from 4.1, and  mg/dl prot. CTH, c-spine wnl. CXR clear. XR R knee, tib+fib,\par L hip wnl. S/p 2L IVF and pain medication. In light of MIKALA, home torsemide & losartan were stopped. Continued w/\par amlodipine & hydralazine 100 TID. Renal and endocrine were consulted for mgmt. Rheumatology c/s for polymyalgia and determined source to be uncontrolled DM neuropathy. During hospital course, DM & HTN were well controlled.  Current etiology for MIKALA determined to be pre-renal/intra-renal injury. No need for inpatient HD, but will need close Nephrology f/u, c/f likely HD in the future. \par \par Today, patient states she feels well. Endorses mild pain, controlled with tylenol. Med rec noted in medication list of note. On ROS, notes bilateral swelling of feet and ankles, present in hospital. Has not worsened, non-pitting per patient. Improves with compression stockings and rising legs. Able to lay flat, no SOB, ENGLISH. \par \par Has Free Style moshe. Did not take Basaglar last night as returned from hospital late, so sugars this AM was in 300s. Took her AM Novolog and sugars later on were 110 (premeal, after Novolog)-169 (after meal 1 hour)-121 (after meal 2 hours). States she sometimes feels fatigued and weak when sugars below 120, feels better after drinking juice. No LOC, lightheadedness, dizziness. No sugars noted below 80, but only one day. \par \par Unable to check BP without BP cuff. Knows to stop Losartan, Torsemide, Jardiance. Starting hydralazine 100 mg TID, Trulicity at new 1.5/week dose, Carvedilol 12.5 mg QD (asked patient if it was BID, states its QD and seen on DC summary as well). Has all medications except Fluticasone. \par

## 2023-02-05 NOTE — REVIEW OF SYSTEMS
[Nausea] : nausea [Vomiting] : vomiting [Headache] : headache [Fever] : no fever [Chills] : no chills [Chest Pain] : no chest pain [Palpitations] : no palpitations [Shortness Of Breath] : no shortness of breath [Wheezing] : no wheezing

## 2023-02-05 NOTE — PLAN
[FreeTextEntry1] : 51 y/o F insulin-dependent DM, sarcoidosis, CKD with proteinuria previous evaluation for LLE pain presenting for TEB with interval progression to symmetric polyarthropathy of ankles, knees, and with involvement of hips. \par \par #Symmetric polyathropathy\par -previous imaging does not suggest OA\par -CK normal \par -Distribution of pain in bilateral ankles and knees suggests sarcoid arthritis/Todd syndrome. 1st line Therapeutic options such as NSAID or colchicine contraindicated in this patient with CKD. Prednisone remains an option although without physical assessment difficult to definitively diagnose and would appreciate rheum assistance. \par -Gout or CPPD disease remain on differential no previous joint aspiration per patient, of note with worsening kidney function patient at higher risk\par -Patient states largely bedbound at this time\par -given clinical complexity encouraged ED visit at time of TEB. follow up call 1/24 AM with no response and left VM and documented.\par -Other causes of symmetrical polyathropathy include SLE, vasculitis, rheumatoid arthritis. Would assess with JOSLYN, anti-dsDNA, C-ANCA, P-ANCA, anti-CCP, and rheumatoid factor. \par -Given patient has been essentially bedbound only ambulating to navigate to bathroom and kitchen with symptoms ongoing over past months will send to [facility] ED for further evaluation and management.

## 2023-02-05 NOTE — HISTORY OF PRESENT ILLNESS
[Home] : at home, [unfilled] , at the time of the visit. [Medical Office: ([facility])___] : at the medical office located in  [Verbal consent obtained from patient] : the patient, [unfilled] [FreeTextEntry1] : OZZIE [de-identified] : 51 y/o F insulin-dependent DM, sarcoidosis, CKD with proteinuria previous evaluation for LLE pain presenting for TEB.\par \par On previous exam at clinic- Pain localized in L hip and L knee but also with some tenderness to palpation of quadriceps. No alarm symptoms. Suspected possible OA with referred pain. \par \par Interval Hx- Has been unable to rise from bed with bilateral knee and ankle pain. Now has been present for past two months. Notes heat at the bilateral knees. No swelling of the knees. When pain is at its worst it is "past 10/10". Movement is limited by pain not focal deficit. No issues with fecal or urinary incontinence consistent with previous. Completed gabapentin 200 TID and was not refilled, notes foot burning alleviated by this medication. Tramadol has trialed one tablet. Tylenol 2 tabs every 8 hours. Has been previously evaluated by rheumatology and would be amenable to return to rheumatology. Cannot recall previous joint aspiration. Previously normal CK. \par \par Also with headache this AM posterior vomited once this morning nonbloody, bile. No changes to vision including double vision or blurriness. With intermittent R side or posterior headaches unsure if photophobia, pounding headache will last a couple of hours when present. Takes tylenol which alleviates the pain. Denies aura. Just a sharp pain.

## 2023-02-06 ENCOUNTER — APPOINTMENT (OUTPATIENT)
Dept: INTERNAL MEDICINE | Facility: CLINIC | Age: 53
End: 2023-02-06
Payer: MEDICARE

## 2023-02-06 PROCEDURE — 99213 OFFICE O/P EST LOW 20 MIN: CPT | Mod: GC,95

## 2023-02-08 ENCOUNTER — NON-APPOINTMENT (OUTPATIENT)
Age: 53
End: 2023-02-08

## 2023-02-08 NOTE — REVIEW OF SYSTEMS
[Abdominal Pain] : abdominal pain [Heartburn] : heartburn [Negative] : Constitutional [Chest Pain] : no chest pain [Nausea] : no nausea [Diarrhea] : diarrhea [Vomiting] : no vomiting

## 2023-02-08 NOTE — PLAN
[FreeTextEntry1] : # T2DM, poorly controlled\par - A1C 10.6 while admitted in Jan 2023 \par - Reviewed FSG (range 100-210s). Pt frequently increasing amount of food to match insulin. Encouraged eating to when she is full but not over extending to meet insulin regimen \par - Cut back on Novolog to 8U TID and Basaglar to 27U qHS \par - C/w Trulicity 1.5mg/wk  \par - Advised pt to keep FSG log \par - F/u appt scheduled 1/24 with Dr. Peterson; Endo appt 3/3 \par - Needs optho and pod eval\par - Will benefit from nutrition consult\par \par # Acid reflux\par - Had episode of epigastric pain with burning, suspect acid reflux. Possibly steroid induced, vs gastroparesis, vs h.pylori? \par - Trial of famotidine 20mg qD\par - Review lifestyle modification next visit \par \par # CKD 2/2 DM  \par - Cr 4.77 eGFR 11  \par - off of torsemide & losartan  \par - needs to see nephro! \par - role for bicarb ? defer to nephro \par \par # HTN \par - Hydral 100 TID, amlodipine 10mg, carvedilol 12.5mg QD (if BP and HR stable next visit, increase to BID. QD dose reasoning unclear, possibly mistake?)\par \par # Sarcoid  \par - C/w Prednisone 10mg qD \par \par # HCM  \par - Next visit, need repeat CBC CMP \par - Due for flu shot, prevnar 20, COVID, Shingrx \par - Mammo, HPV Pap, CRC screening: NO record on file\par \par =====================\par RTC as scheduled 2/24 for DM management and HCM discussion\par \par Care discussed with Dr. Beck\par \par Lisha Hess, PGY3\par [facility]\par

## 2023-02-08 NOTE — ASSESSMENT
[FreeTextEntry1] : 52F with sarcoidosis, T2DM (poorly controlled), CKD, recent hospitalization for DM neuropathy progression of CKD, who presents via telephonic visit for follow up. \par \par

## 2023-02-08 NOTE — HISTORY OF PRESENT ILLNESS
[Home] : at home, [unfilled] , at the time of the visit. [Medical Office: ([place])___] : at the medical office located in  [Verbal consent obtained from patient] : the patient, [unfilled] [FreeTextEntry1] : Follow up [de-identified] : 52F with sarcoidosis, T2DM, and CKD presents for follow up. \par \par Interval hx: \par Seen by Dr. Mireles 2/2/23 (telehealth) for post hospital visit. Called office reporting hypoglycemia in s/o skipping meals and insulin injection the day after. \par \par Pt report feeling unwell, states she woke  up around 4am this morning with sudden abdominal pain in epigastrium, non radiating, intermittent, 6 out of 10 severity, and burning in quality. She denies nausea, vomiting, changes in diet. \par \par She checks glucose at home before meals and notes range is around 100s in the morning, and 160-210s throughout the day. Since TEB visit 2/3 with Dr. Mireles, she denies new episodes of hypoglycemia but does notice glucose levels in low 80s when she does not eat a large amount. She reports a small appetite (not new). \par \par

## 2023-02-10 ENCOUNTER — NON-APPOINTMENT (OUTPATIENT)
Age: 53
End: 2023-02-10

## 2023-02-15 ENCOUNTER — OUTPATIENT (OUTPATIENT)
Dept: OUTPATIENT SERVICES | Facility: HOSPITAL | Age: 53
LOS: 1 days | End: 2023-02-15

## 2023-02-15 ENCOUNTER — APPOINTMENT (OUTPATIENT)
Dept: NEPHROLOGY | Facility: CLINIC | Age: 53
End: 2023-02-15
Payer: MEDICARE

## 2023-02-15 VITALS
BODY MASS INDEX: 23.03 KG/M2 | OXYGEN SATURATION: 99 % | DIASTOLIC BLOOD PRESSURE: 60 MMHG | HEART RATE: 95 BPM | SYSTOLIC BLOOD PRESSURE: 140 MMHG | HEIGHT: 61 IN | WEIGHT: 122 LBS

## 2023-02-15 DIAGNOSIS — R60.0 LOCALIZED EDEMA: ICD-10-CM

## 2023-02-15 DIAGNOSIS — N18.5 CHRONIC KIDNEY DISEASE, STAGE 5: ICD-10-CM

## 2023-02-15 DIAGNOSIS — I10 ESSENTIAL (PRIMARY) HYPERTENSION: ICD-10-CM

## 2023-02-15 PROCEDURE — 99214 OFFICE O/P EST MOD 30 MIN: CPT | Mod: GC

## 2023-02-15 NOTE — PHYSICAL EXAM
[General Appearance - Alert] : alert [General Appearance - In No Acute Distress] : in no acute distress [General Appearance - Well Nourished] : well nourished [Sclera] : the sclera and conjunctiva were normal [Outer Ear] : the ears and nose were normal in appearance [Jugular Venous Distention Increased] : there was no jugular-venous distention [Respiration, Rhythm And Depth] : normal respiratory rhythm and effort [Auscultation Breath Sounds / Voice Sounds] : lungs were clear to auscultation bilaterally [Heart Sounds] : normal S1 and S2 [Heart Sounds Gallop] : no gallops [Heart Sounds Pericardial Friction Rub] : no pericardial rub [Abdomen Soft] : soft [Abdomen Tenderness] : non-tender [No CVA Tenderness] : no ~M costovertebral angle tenderness [Abnormal Walk] : normal gait [Nail Clubbing] : no clubbing  or cyanosis of the fingernails [] : no rash [Oriented To Time, Place, And Person] : oriented to person, place, and time [Affect] : the affect was normal [Mood] : the mood was normal [FreeTextEntry1] : B/L LE: pitting edema present on exam today.

## 2023-02-15 NOTE — REVIEW OF SYSTEMS
[Negative] : Heme/Lymph [Recent Weight Gain (___ Lbs)] : recent [unfilled] ~Ulb weight gain [Lower Ext Edema] : lower extremity edema [As Noted in HPI] : as noted in HPI [SOB on Exertion] : shortness of breath during exertion [Arthralgias] : arthralgias [Limb Swelling] : limb swelling [Fever] : no fever [Eye Pain] : no eye pain [Eyesight Problems] : no eyesight problems [Earache] : no earache [Sore Throat] : no sore throat [Heart Rate Is Slow] : the heart rate was not slow [Heart Rate Is Fast] : the heart rate was not fast [Chest Pain] : no chest pain [Palpitations] : no palpitations [Cough] : no cough [Abdominal Pain] : no abdominal pain [Vomiting] : no vomiting [Dysuria] : no dysuria [Incontinence] : no incontinence [Skin Lesions] : no skin lesions [Skin Wound] : no skin wound [Confused] : no confusion [Convulsions] : no convulsions [Depression] : no depression [Muscle Weakness] : no muscle weakness [FreeTextEntry5] : B/L LE edema [FreeTextEntry9] : B/L LE edema [de-identified] : chronic LE neuropathy

## 2023-02-15 NOTE — ASSESSMENT
[FreeTextEntry1] : 1. CKD Stage 5/Proteinuria: Pt. with advanced/progressive CKD in setting of uncontrolled DM. Pt. with most likely diabetic nephropathy. Scr was 2.95 on labs done on 11/1/22. Pt was initiated on PO Torsemide 10 mg once daily for B/L LE edema. Scr increased to 4.19 on labs done during last clinic visit on 1/11/23. Torsemide dose was decreased to 5 mg once daily in view of worsening Scr and resolution of LE swelling. Pt. was recently admitted to [facility] (1/25/23-1/31/23) for worsening B/L LE chronic pain. Scr was elevated at 4.9 on 1/25/23 and remained elevated at 4.7 on 1/30/23. Torsemide and Losartan were held during hospitalization and upon discharge. Check renal panel today. Discussed with pt. and daughter regarding need for RRT in near future. Various options for RRT including HD, PD and kidney transplantation reviewed with patient and her daughter during clinic visit today. Pt. enrolled in [facility] Transitions in CKD program. Check renal panel today. Importance of better glycemic and BP control discussed with the patient today. Monitor kidney function. Avoid NSAIDs, RCA and nephrotoxins. \par  \par 2. HTN: BP is acceptable range during clinic visit today. Continue with current BP meds. Low salt diet advised. Monitor BP daily at home. \par \par 3. LE edema: Pt with significant B/L LE swelling on exam today. Pt advised to increase Torsemide to 20 mg PO once daily. Low salt diet and fluid restriction advised. Monitor body weight.  \par \par RTC: 1 week

## 2023-02-15 NOTE — END OF VISIT
[FreeTextEntry3] : I was physically present for the key portions of the evaluation and management (E/M) service provided. I agree with the above history, ROS, physical exam, assessment and plan which I have reviewed and edited where appropriate. I have also reviewed the assessment and management of CKD, HTN and LE edema with the patient during clinic visit today.\par \par Pt. with advanced CKD stage 5. Recent Scr elevated at 4.7 in January 2023. Check labs today (as outlined above). Importance of better BP and glycemic control reviewed with patient during clinic visit today. Avoid nephrotoxins.

## 2023-02-15 NOTE — HISTORY OF PRESENT ILLNESS
[FreeTextEntry1] : 52-year-old female with longstanding history of insulin-dependent DM, sarcoidosis (biopsy proven in 2010), CKD, proteinuria, seizure disorder and hypercalcemia presented for follow-up visit. Pt. was seen in clinic last week on 1/11/23.  \par \par Kidney history: Pt. with previous history of MIKALA in the setting of hypercalcemia. Pt was initially seen during her hospitalization at [facility] from 9/25/17 to 10/3/17. At that time, the patient was admitted with Scr of 5.65 and serum calcium of 15.3 on 09/25/17. Scr improved to 3.48 and serum calcium decreased to 10.7 on 10/03/17. Scr improved 1.44 and serum calcium was WNL at 10.1 on labs done on 9/24/18. Scr remained stable at 1.41 on 8/9/2021. Scr increased to 3.36 on 9/12/22. Pt with history of long standing uncontrolled DM. Scr was 2.95 on labs done on 11/1/22. Pt was initiated on PO Torsemide 10 mg once daily for B/L LE edema. Scr increased to 4.19 on labs done during clinic visit on 1/11/23. Torsemide dose was decreased to 5 mg once daily in view of worsening Scr and resolution of LE swelling. Pt. was admitted to [facility] (1/25/23-1/31/23) for worsening B/L LE chronic pain. Scr was elevated at 4.9 on 1/25/23 and remained elevated at 4.7 on 1/30/23. Pt. was seen by inpatient house nephrology team. Torsemide and Losartan were held during hospitalization and upon discharge. \par \par Pt. reported worsening of LE swelling with feeling of intermittent SOB after ~10 days of discharge from [facility]. Pt. was advised to initiate PO Torsemide 10 mg  once daily on 2/13/23. \par \par Pt. currently complaining of worsening B/L LE swelling and feeling intermittent SOB. Pt also reports chronic B/L LE pain. No fever, CP, SOB, HA or dizziness during clinic visit today.

## 2023-02-15 NOTE — REASON FOR VISIT
[Follow-Up] : a follow-up visit [Family Member] : family member [FreeTextEntry1] : Chronic kidney disease and hypertension

## 2023-02-17 ENCOUNTER — NON-APPOINTMENT (OUTPATIENT)
Age: 53
End: 2023-02-17

## 2023-02-20 ENCOUNTER — INPATIENT (INPATIENT)
Facility: HOSPITAL | Age: 53
LOS: 4 days | Discharge: HOME | DRG: 640 | End: 2023-02-25
Attending: STUDENT IN AN ORGANIZED HEALTH CARE EDUCATION/TRAINING PROGRAM | Admitting: HOSPITALIST
Payer: MEDICARE

## 2023-02-20 VITALS
HEIGHT: 61 IN | WEIGHT: 119.93 LBS | DIASTOLIC BLOOD PRESSURE: 79 MMHG | RESPIRATION RATE: 22 BRPM | TEMPERATURE: 98 F | OXYGEN SATURATION: 95 % | SYSTOLIC BLOOD PRESSURE: 126 MMHG | HEART RATE: 90 BPM

## 2023-02-20 DIAGNOSIS — J81.1 CHRONIC PULMONARY EDEMA: ICD-10-CM

## 2023-02-20 DIAGNOSIS — Z29.9 ENCOUNTER FOR PROPHYLACTIC MEASURES, UNSPECIFIED: ICD-10-CM

## 2023-02-20 DIAGNOSIS — N18.4 CHRONIC KIDNEY DISEASE, STAGE 4 (SEVERE): ICD-10-CM

## 2023-02-20 DIAGNOSIS — I10 ESSENTIAL (PRIMARY) HYPERTENSION: ICD-10-CM

## 2023-02-20 DIAGNOSIS — N18.9 CHRONIC KIDNEY DISEASE, UNSPECIFIED: ICD-10-CM

## 2023-02-20 DIAGNOSIS — D86.9 SARCOIDOSIS, UNSPECIFIED: ICD-10-CM

## 2023-02-20 DIAGNOSIS — E87.70 FLUID OVERLOAD, UNSPECIFIED: ICD-10-CM

## 2023-02-20 DIAGNOSIS — A41.9 SEPSIS, UNSPECIFIED ORGANISM: ICD-10-CM

## 2023-02-20 DIAGNOSIS — E11.9 TYPE 2 DIABETES MELLITUS WITHOUT COMPLICATIONS: ICD-10-CM

## 2023-02-20 DIAGNOSIS — I50.9 HEART FAILURE, UNSPECIFIED: ICD-10-CM

## 2023-02-20 DIAGNOSIS — E78.5 HYPERLIPIDEMIA, UNSPECIFIED: ICD-10-CM

## 2023-02-20 LAB
ALBUMIN SERPL ELPH-MCNC: 3.5 G/DL — SIGNIFICANT CHANGE UP (ref 3.3–5)
ALP SERPL-CCNC: 43 U/L — SIGNIFICANT CHANGE UP (ref 40–120)
ALT FLD-CCNC: 16 U/L — SIGNIFICANT CHANGE UP (ref 10–45)
ANION GAP SERPL CALC-SCNC: 19 MMOL/L — HIGH (ref 5–17)
ANION GAP SERPL CALC-SCNC: 19 MMOL/L — HIGH (ref 5–17)
APPEARANCE UR: CLEAR — SIGNIFICANT CHANGE UP
APTT BLD: 25.1 SEC — LOW (ref 27.5–35.5)
AST SERPL-CCNC: 13 U/L — SIGNIFICANT CHANGE UP (ref 10–40)
BACTERIA # UR AUTO: NEGATIVE — SIGNIFICANT CHANGE UP
BASE EXCESS BLDV CALC-SCNC: -6.9 MMOL/L — LOW (ref -2–3)
BASOPHILS # BLD AUTO: 0.06 K/UL — SIGNIFICANT CHANGE UP (ref 0–0.2)
BASOPHILS NFR BLD AUTO: 0.4 % — SIGNIFICANT CHANGE UP (ref 0–2)
BILIRUB SERPL-MCNC: 0.2 MG/DL — SIGNIFICANT CHANGE UP (ref 0.2–1.2)
BILIRUB UR-MCNC: NEGATIVE — SIGNIFICANT CHANGE UP
BLD GP AB SCN SERPL QL: NEGATIVE — SIGNIFICANT CHANGE UP
BUN SERPL-MCNC: 102 MG/DL — HIGH (ref 7–23)
BUN SERPL-MCNC: 96 MG/DL — HIGH (ref 7–23)
CA-I SERPL-SCNC: 1.03 MMOL/L — LOW (ref 1.15–1.33)
CALCIUM SERPL-MCNC: 7.7 MG/DL — LOW (ref 8.4–10.5)
CALCIUM SERPL-MCNC: 7.9 MG/DL — LOW (ref 8.4–10.5)
CHLORIDE BLDV-SCNC: 107 MMOL/L — SIGNIFICANT CHANGE UP (ref 96–108)
CHLORIDE SERPL-SCNC: 103 MMOL/L — SIGNIFICANT CHANGE UP (ref 96–108)
CHLORIDE SERPL-SCNC: 99 MMOL/L — SIGNIFICANT CHANGE UP (ref 96–108)
CO2 BLDV-SCNC: 20 MMOL/L — LOW (ref 22–26)
CO2 SERPL-SCNC: 15 MMOL/L — LOW (ref 22–31)
CO2 SERPL-SCNC: 15 MMOL/L — LOW (ref 22–31)
COLOR SPEC: COLORLESS — SIGNIFICANT CHANGE UP
CREAT ?TM UR-MCNC: 18 MG/DL — SIGNIFICANT CHANGE UP
CREAT SERPL-MCNC: 5.15 MG/DL — HIGH (ref 0.5–1.3)
CREAT SERPL-MCNC: 5.25 MG/DL — HIGH (ref 0.5–1.3)
DIFF PNL FLD: NEGATIVE — SIGNIFICANT CHANGE UP
EGFR: 10 ML/MIN/1.73M2 — LOW
EGFR: 9 ML/MIN/1.73M2 — LOW
EOSINOPHIL # BLD AUTO: 0.53 K/UL — HIGH (ref 0–0.5)
EOSINOPHIL NFR BLD AUTO: 3.4 % — SIGNIFICANT CHANGE UP (ref 0–6)
EPI CELLS # UR: 2 /HPF — SIGNIFICANT CHANGE UP
FLUAV AG NPH QL: SIGNIFICANT CHANGE UP
FLUBV AG NPH QL: SIGNIFICANT CHANGE UP
GAS PNL BLDV: 135 MMOL/L — LOW (ref 136–145)
GAS PNL BLDV: SIGNIFICANT CHANGE UP
GAS PNL BLDV: SIGNIFICANT CHANGE UP
GLUCOSE BLDC GLUCOMTR-MCNC: 228 MG/DL — HIGH (ref 70–99)
GLUCOSE BLDC GLUCOMTR-MCNC: 282 MG/DL — HIGH (ref 70–99)
GLUCOSE BLDC GLUCOMTR-MCNC: 364 MG/DL — HIGH (ref 70–99)
GLUCOSE BLDV-MCNC: 145 MG/DL — HIGH (ref 70–99)
GLUCOSE SERPL-MCNC: 150 MG/DL — HIGH (ref 70–99)
GLUCOSE SERPL-MCNC: 306 MG/DL — HIGH (ref 70–99)
GLUCOSE UR QL: ABNORMAL
HCO3 BLDV-SCNC: 19 MMOL/L — LOW (ref 22–29)
HCT VFR BLD CALC: 24.2 % — LOW (ref 34.5–45)
HCT VFR BLDA CALC: 25 % — LOW (ref 34.5–46.5)
HGB BLD CALC-MCNC: 8.3 G/DL — LOW (ref 11.7–16.1)
HGB BLD-MCNC: 8 G/DL — LOW (ref 11.5–15.5)
HYALINE CASTS # UR AUTO: 1 /LPF — SIGNIFICANT CHANGE UP (ref 0–2)
IMM GRANULOCYTES NFR BLD AUTO: 2.9 % — HIGH (ref 0–0.9)
INR BLD: 0.85 RATIO — LOW (ref 0.88–1.16)
KETONES UR-MCNC: NEGATIVE — SIGNIFICANT CHANGE UP
LACTATE BLDV-MCNC: 1.2 MMOL/L — SIGNIFICANT CHANGE UP (ref 0.5–2)
LEUKOCYTE ESTERASE UR-ACNC: NEGATIVE — SIGNIFICANT CHANGE UP
LYMPHOCYTES # BLD AUTO: 16.9 % — SIGNIFICANT CHANGE UP (ref 13–44)
LYMPHOCYTES # BLD AUTO: 2.6 K/UL — SIGNIFICANT CHANGE UP (ref 1–3.3)
MCHC RBC-ENTMCNC: 27.8 PG — SIGNIFICANT CHANGE UP (ref 27–34)
MCHC RBC-ENTMCNC: 33.1 GM/DL — SIGNIFICANT CHANGE UP (ref 32–36)
MCV RBC AUTO: 84 FL — SIGNIFICANT CHANGE UP (ref 80–100)
MONOCYTES # BLD AUTO: 1.21 K/UL — HIGH (ref 0–0.9)
MONOCYTES NFR BLD AUTO: 7.9 % — SIGNIFICANT CHANGE UP (ref 2–14)
NEUTROPHILS # BLD AUTO: 10.57 K/UL — HIGH (ref 1.8–7.4)
NEUTROPHILS NFR BLD AUTO: 68.5 % — SIGNIFICANT CHANGE UP (ref 43–77)
NITRITE UR-MCNC: NEGATIVE — SIGNIFICANT CHANGE UP
NRBC # BLD: 0 /100 WBCS — SIGNIFICANT CHANGE UP (ref 0–0)
NT-PROBNP SERPL-SCNC: 7876 PG/ML — HIGH (ref 0–300)
PCO2 BLDV: 40 MMHG — SIGNIFICANT CHANGE UP (ref 39–42)
PH BLDV: 7.29 — LOW (ref 7.32–7.43)
PH UR: 6.5 — SIGNIFICANT CHANGE UP (ref 5–8)
PLATELET # BLD AUTO: 290 K/UL — SIGNIFICANT CHANGE UP (ref 150–400)
PO2 BLDV: 45 MMHG — SIGNIFICANT CHANGE UP (ref 25–45)
POTASSIUM BLDV-SCNC: 5.2 MMOL/L — HIGH (ref 3.5–5.1)
POTASSIUM SERPL-MCNC: 5.2 MMOL/L — SIGNIFICANT CHANGE UP (ref 3.5–5.3)
POTASSIUM SERPL-MCNC: 5.8 MMOL/L — HIGH (ref 3.5–5.3)
POTASSIUM SERPL-SCNC: 5.2 MMOL/L — SIGNIFICANT CHANGE UP (ref 3.5–5.3)
POTASSIUM SERPL-SCNC: 5.8 MMOL/L — HIGH (ref 3.5–5.3)
POTASSIUM UR-SCNC: 26 MMOL/L — SIGNIFICANT CHANGE UP
PROT ?TM UR-MCNC: 229 MG/DL — HIGH (ref 0–12)
PROT SERPL-MCNC: 6.2 G/DL — SIGNIFICANT CHANGE UP (ref 6–8.3)
PROT UR-MCNC: ABNORMAL
PROT/CREAT UR-RTO: 12.7 RATIO — HIGH (ref 0–0.2)
PROTHROM AB SERPL-ACNC: 9.8 SEC — LOW (ref 10.5–13.4)
RBC # BLD: 2.88 M/UL — LOW (ref 3.8–5.2)
RBC # FLD: 13.1 % — SIGNIFICANT CHANGE UP (ref 10.3–14.5)
RBC CASTS # UR COMP ASSIST: 2 /HPF — SIGNIFICANT CHANGE UP (ref 0–4)
RH IG SCN BLD-IMP: NEGATIVE — SIGNIFICANT CHANGE UP
RH IG SCN BLD-IMP: NEGATIVE — SIGNIFICANT CHANGE UP
RSV RNA NPH QL NAA+NON-PROBE: SIGNIFICANT CHANGE UP
SAO2 % BLDV: 77.2 % — SIGNIFICANT CHANGE UP (ref 67–88)
SARS-COV-2 RNA SPEC QL NAA+PROBE: SIGNIFICANT CHANGE UP
SODIUM SERPL-SCNC: 133 MMOL/L — LOW (ref 135–145)
SODIUM SERPL-SCNC: 137 MMOL/L — SIGNIFICANT CHANGE UP (ref 135–145)
SODIUM UR-SCNC: 100 MMOL/L — SIGNIFICANT CHANGE UP
SP GR SPEC: 1.01 — SIGNIFICANT CHANGE UP (ref 1.01–1.02)
UROBILINOGEN FLD QL: NEGATIVE — SIGNIFICANT CHANGE UP
WBC # BLD: 15.41 K/UL — HIGH (ref 3.8–10.5)
WBC # FLD AUTO: 15.41 K/UL — HIGH (ref 3.8–10.5)
WBC UR QL: 2 /HPF — SIGNIFICANT CHANGE UP (ref 0–5)

## 2023-02-20 PROCEDURE — 99285 EMERGENCY DEPT VISIT HI MDM: CPT | Mod: GC

## 2023-02-20 PROCEDURE — 99223 1ST HOSP IP/OBS HIGH 75: CPT

## 2023-02-20 PROCEDURE — 71045 X-RAY EXAM CHEST 1 VIEW: CPT | Mod: 26

## 2023-02-20 PROCEDURE — 93308 TTE F-UP OR LMTD: CPT | Mod: 26

## 2023-02-20 PROCEDURE — 99223 1ST HOSP IP/OBS HIGH 75: CPT | Mod: GC

## 2023-02-20 RX ORDER — DEXTROSE 50 % IN WATER 50 %
25 SYRINGE (ML) INTRAVENOUS ONCE
Refills: 0 | Status: DISCONTINUED | OUTPATIENT
Start: 2023-02-20 | End: 2023-02-25

## 2023-02-20 RX ORDER — AMLODIPINE BESYLATE 2.5 MG/1
10 TABLET ORAL DAILY
Refills: 0 | Status: DISCONTINUED | OUTPATIENT
Start: 2023-02-20 | End: 2023-02-25

## 2023-02-20 RX ORDER — ACETAMINOPHEN 500 MG
650 TABLET ORAL EVERY 6 HOURS
Refills: 0 | Status: DISCONTINUED | OUTPATIENT
Start: 2023-02-20 | End: 2023-02-25

## 2023-02-20 RX ORDER — INSULIN LISPRO 100/ML
VIAL (ML) SUBCUTANEOUS AT BEDTIME
Refills: 0 | Status: DISCONTINUED | OUTPATIENT
Start: 2023-02-20 | End: 2023-02-25

## 2023-02-20 RX ORDER — BUMETANIDE 0.25 MG/ML
4 INJECTION INTRAMUSCULAR; INTRAVENOUS ONCE
Refills: 0 | Status: DISCONTINUED | OUTPATIENT
Start: 2023-02-20 | End: 2023-02-20

## 2023-02-20 RX ORDER — SODIUM CHLORIDE 9 MG/ML
1000 INJECTION, SOLUTION INTRAVENOUS
Refills: 0 | Status: DISCONTINUED | OUTPATIENT
Start: 2023-02-20 | End: 2023-02-25

## 2023-02-20 RX ORDER — BUMETANIDE 0.25 MG/ML
4 INJECTION INTRAMUSCULAR; INTRAVENOUS ONCE
Refills: 0 | Status: COMPLETED | OUTPATIENT
Start: 2023-02-20 | End: 2023-02-20

## 2023-02-20 RX ORDER — INSULIN HUMAN 100 [IU]/ML
5 INJECTION, SOLUTION SUBCUTANEOUS ONCE
Refills: 0 | Status: COMPLETED | OUTPATIENT
Start: 2023-02-20 | End: 2023-02-20

## 2023-02-20 RX ORDER — ONDANSETRON 8 MG/1
4 TABLET, FILM COATED ORAL EVERY 8 HOURS
Refills: 0 | Status: DISCONTINUED | OUTPATIENT
Start: 2023-02-20 | End: 2023-02-25

## 2023-02-20 RX ORDER — HEPARIN SODIUM 5000 [USP'U]/ML
5000 INJECTION INTRAVENOUS; SUBCUTANEOUS EVERY 8 HOURS
Refills: 0 | Status: DISCONTINUED | OUTPATIENT
Start: 2023-02-20 | End: 2023-02-25

## 2023-02-20 RX ORDER — ATORVASTATIN CALCIUM 80 MG/1
80 TABLET, FILM COATED ORAL AT BEDTIME
Refills: 0 | Status: DISCONTINUED | OUTPATIENT
Start: 2023-02-20 | End: 2023-02-25

## 2023-02-20 RX ORDER — SODIUM ZIRCONIUM CYCLOSILICATE 10 G/10G
5 POWDER, FOR SUSPENSION ORAL ONCE
Refills: 0 | Status: COMPLETED | OUTPATIENT
Start: 2023-02-20 | End: 2023-02-20

## 2023-02-20 RX ORDER — GLUCAGON INJECTION, SOLUTION 0.5 MG/.1ML
1 INJECTION, SOLUTION SUBCUTANEOUS ONCE
Refills: 0 | Status: DISCONTINUED | OUTPATIENT
Start: 2023-02-20 | End: 2023-02-25

## 2023-02-20 RX ORDER — LANOLIN ALCOHOL/MO/W.PET/CERES
3 CREAM (GRAM) TOPICAL AT BEDTIME
Refills: 0 | Status: DISCONTINUED | OUTPATIENT
Start: 2023-02-20 | End: 2023-02-25

## 2023-02-20 RX ORDER — DEXTROSE 50 % IN WATER 50 %
15 SYRINGE (ML) INTRAVENOUS ONCE
Refills: 0 | Status: DISCONTINUED | OUTPATIENT
Start: 2023-02-20 | End: 2023-02-25

## 2023-02-20 RX ORDER — ACETAMINOPHEN 500 MG
1000 TABLET ORAL ONCE
Refills: 0 | Status: COMPLETED | OUTPATIENT
Start: 2023-02-20 | End: 2023-02-20

## 2023-02-20 RX ORDER — DEXTROSE 50 % IN WATER 50 %
50 SYRINGE (ML) INTRAVENOUS ONCE
Refills: 0 | Status: COMPLETED | OUTPATIENT
Start: 2023-02-20 | End: 2023-02-20

## 2023-02-20 RX ORDER — CEFEPIME 1 G/1
2000 INJECTION, POWDER, FOR SOLUTION INTRAMUSCULAR; INTRAVENOUS ONCE
Refills: 0 | Status: COMPLETED | OUTPATIENT
Start: 2023-02-20 | End: 2023-02-20

## 2023-02-20 RX ORDER — INSULIN GLARGINE 100 [IU]/ML
18 INJECTION, SOLUTION SUBCUTANEOUS AT BEDTIME
Refills: 0 | Status: DISCONTINUED | OUTPATIENT
Start: 2023-02-20 | End: 2023-02-20

## 2023-02-20 RX ORDER — HYDRALAZINE HCL 50 MG
100 TABLET ORAL THREE TIMES A DAY
Refills: 0 | Status: DISCONTINUED | OUTPATIENT
Start: 2023-02-20 | End: 2023-02-25

## 2023-02-20 RX ORDER — AZITHROMYCIN 500 MG/1
500 TABLET, FILM COATED ORAL ONCE
Refills: 0 | Status: COMPLETED | OUTPATIENT
Start: 2023-02-20 | End: 2023-02-20

## 2023-02-20 RX ORDER — INSULIN GLARGINE 100 [IU]/ML
20 INJECTION, SOLUTION SUBCUTANEOUS AT BEDTIME
Refills: 0 | Status: DISCONTINUED | OUTPATIENT
Start: 2023-02-20 | End: 2023-02-21

## 2023-02-20 RX ORDER — DEXTROSE 50 % IN WATER 50 %
12.5 SYRINGE (ML) INTRAVENOUS ONCE
Refills: 0 | Status: DISCONTINUED | OUTPATIENT
Start: 2023-02-20 | End: 2023-02-25

## 2023-02-20 RX ORDER — BUMETANIDE 0.25 MG/ML
2 INJECTION INTRAMUSCULAR; INTRAVENOUS
Qty: 20 | Refills: 0 | Status: DISCONTINUED | OUTPATIENT
Start: 2023-02-20 | End: 2023-02-23

## 2023-02-20 RX ORDER — INSULIN LISPRO 100/ML
VIAL (ML) SUBCUTANEOUS
Refills: 0 | Status: DISCONTINUED | OUTPATIENT
Start: 2023-02-20 | End: 2023-02-25

## 2023-02-20 RX ORDER — FLUTICASONE PROPIONATE 50 MCG
1 SPRAY, SUSPENSION NASAL
Refills: 0 | Status: DISCONTINUED | OUTPATIENT
Start: 2023-02-20 | End: 2023-02-25

## 2023-02-20 RX ORDER — CARVEDILOL PHOSPHATE 80 MG/1
6.25 CAPSULE, EXTENDED RELEASE ORAL EVERY 12 HOURS
Refills: 0 | Status: DISCONTINUED | OUTPATIENT
Start: 2023-02-20 | End: 2023-02-25

## 2023-02-20 RX ORDER — GABAPENTIN 400 MG/1
100 CAPSULE ORAL
Refills: 0 | Status: DISCONTINUED | OUTPATIENT
Start: 2023-02-20 | End: 2023-02-20

## 2023-02-20 RX ORDER — FAMOTIDINE 10 MG/ML
20 INJECTION INTRAVENOUS DAILY
Refills: 0 | Status: DISCONTINUED | OUTPATIENT
Start: 2023-02-20 | End: 2023-02-25

## 2023-02-20 RX ADMIN — CEFEPIME 2000 MILLIGRAM(S): 1 INJECTION, POWDER, FOR SOLUTION INTRAMUSCULAR; INTRAVENOUS at 10:12

## 2023-02-20 RX ADMIN — Medication 100 MILLIGRAM(S): at 23:24

## 2023-02-20 RX ADMIN — Medication 1: at 23:27

## 2023-02-20 RX ADMIN — AZITHROMYCIN 500 MILLIGRAM(S): 500 TABLET, FILM COATED ORAL at 12:19

## 2023-02-20 RX ADMIN — Medication 1000 MILLIGRAM(S): at 12:19

## 2023-02-20 RX ADMIN — Medication 1 SPRAY(S): at 17:48

## 2023-02-20 RX ADMIN — Medication 2: at 15:41

## 2023-02-20 RX ADMIN — ATORVASTATIN CALCIUM 80 MILLIGRAM(S): 80 TABLET, FILM COATED ORAL at 23:25

## 2023-02-20 RX ADMIN — AZITHROMYCIN 255 MILLIGRAM(S): 500 TABLET, FILM COATED ORAL at 10:16

## 2023-02-20 RX ADMIN — CEFEPIME 100 MILLIGRAM(S): 1 INJECTION, POWDER, FOR SOLUTION INTRAMUSCULAR; INTRAVENOUS at 10:00

## 2023-02-20 RX ADMIN — INSULIN GLARGINE 20 UNIT(S): 100 INJECTION, SOLUTION SUBCUTANEOUS at 23:21

## 2023-02-20 RX ADMIN — BUMETANIDE 132 MILLIGRAM(S): 0.25 INJECTION INTRAMUSCULAR; INTRAVENOUS at 12:51

## 2023-02-20 RX ADMIN — BUMETANIDE 10 MG/HR: 0.25 INJECTION INTRAMUSCULAR; INTRAVENOUS at 17:45

## 2023-02-20 RX ADMIN — CARVEDILOL PHOSPHATE 6.25 MILLIGRAM(S): 80 CAPSULE, EXTENDED RELEASE ORAL at 17:49

## 2023-02-20 RX ADMIN — HEPARIN SODIUM 5000 UNIT(S): 5000 INJECTION INTRAVENOUS; SUBCUTANEOUS at 23:26

## 2023-02-20 RX ADMIN — Medication 400 MILLIGRAM(S): at 11:06

## 2023-02-20 RX ADMIN — Medication 100 MILLIGRAM(S): at 14:16

## 2023-02-20 RX ADMIN — Medication 650 MILLIGRAM(S): at 23:26

## 2023-02-20 NOTE — CHART NOTE - NSCHARTNOTEFT_GEN_A_CORE
TO BE COMPLETED WITHIN 6 HOURS OF INITIAL ASSESSMENT:    For use in patients that have 2 sepsis criteria and new organ dysfunction   •	New or increased oxygen requirement  •	Creatinine >2mg/dL  •	Bilirubin>2mg/dL  •	Platelet <100,000/mm3  •	INR >1.5, PTT>60  •	Lactate >2    If patient persistent hypotension (SBP<90) or any lactate >4 then provider evaluation (Physician/PA/NP) within 30 minutes of bolus completion is required.    Vital Signs Last 24 Hrs  T(C): 36.9 (20 Feb 2023 08:46), Max: 36.9 (20 Feb 2023 08:46)  T(F): 98.4 (20 Feb 2023 08:46), Max: 98.4 (20 Feb 2023 08:46)  HR: 89 (20 Feb 2023 08:46) (89 - 90)  BP: 131/76 (20 Feb 2023 08:46) (126/79 - 131/76)  BP(mean): --  RR: 28 (20 Feb 2023 08:46) (22 - 28)  SpO2: 94% (20 Feb 2023 08:46) (94% - 95%)    Parameters below as of 20 Feb 2023 08:46  Patient On (Oxygen Delivery Method): room air        		  LUNGS:  [  ] Clear bilaterally [  ] Wheeze [  ] Rhonchi [  ] Rales [  ] Crackles; Other:  HEART: [  ]RRR [  ] No murmur [  ]  Normal S1S2 [  ] Tachycardia;  Other:  CAPILLARY REFILL:  	Fingers: [  ] less than 2 seconds [  ] more than 2 seconds                                           Toes: [  ]  less than 2 seconds [  ] more than 2 seconds   PERIPHERAL PULSES:  Radial: [  ] Palpable  [  ]  non-palpable                                         Dorsalis Pedis: [  ] Palpable  [  ] non-palpable                                         Posterior Tibial: [  ] Palpable  [  ] non-palpable                                          Other:  SKIN:   [  ]  Diaphoretic  [  ]  Mottling  [  ]  Cold extremities  [  ]  Warm [  ]  Dry                      Other:    Labs:  20 Feb 2023 09:04    137    |  103    |  102    ----------------------------<  150    5.2     |  15     |  5.15     Ca    7.7        20 Feb 2023 09:04    TPro  6.2    /  Alb  3.5    /  TBili  0.2    /  DBili  x      /  AST  13     /  ALT  16     /  AlkPhos  43     20 Feb 2023 09:04                          8.0    15.41 )-----------( 290      ( 20 Feb 2023 09:04 )             24.2     PT/INR - ( 20 Feb 2023 09:04 )   PT: 9.8 sec;   INR: 0.85 ratio         PTT - ( 20 Feb 2023 09:04 )  PTT:25.1 sec  Lactate: 1.2    [ ] I have re-evaluated the patient's fluid status and reviewed vital signs. Clinical evaluation demonstrates an appropriate response to fluid resuscitation, with subsequent plan as follows:    The patient was not given 30ml/kg of fluid because it would be detrimental to the patient's condition despite having (check at least 1):  [ ] Hypotension  [ ] Lactate >=4  [ ] Septic shock     Due to (check at least 1):  [ ] Advanced stage heart failure (symptoms with minimal exertion or symptoms at rest)  [ ] Advanced stage kidney disease with GFR<30ml/min    Patient received _________ amount     Plan (orders must be placed in EMR):     [  ]  Check Repeat Lactate   [x]  No change in current plan  [  ]  Start Vasopressors:  [  ]  Repeat Fluid Bolus:  [  ] other:    Care Discussed with Consultants/Other Providers [x] YES  [ ] NO admitting team The patient meets criteria for diagnosis of severe sepsis as she has tachypnea and leukocytosis with a new oxygen requirement. Appropriately, the patient has received antibiotic therapy for coverage of possible community-acquired pneumonia (admitting team can consider broadening antibiotic coverage given recent hospitalization). The patient appropriately has not received fluid resuscitation as she has an acute kidney injury superimposed on advanced ckd with evidence of gross volume overload. She does not have an elevated serum lactate, and she is not hypotensive. She has brisk capillary refill on exam suggesting strong end-organ perfusion. Most likely, the patient's clinical extremis is secondary to fluid overload in the setting of progression of advanced kidney disease. Thus, diuresis is appropriate.

## 2023-02-20 NOTE — H&P ADULT - PROBLEM SELECTOR PLAN 2
-pt endorsing worsening LE edema and SOB since being d/c from hospital off of home torsemide   -pt w/ b/l infiltrates on CXR suspicious for pulmonary edema   -PE w/ crackles b/l   -possibly i/s/o CHF and volume overload due to under diuresis   -pt s/p bumex 4 IV in ED   -c/w lasix 40 IV qD  -strict Is/Os  -daily weights   -f/u proBNP  -f/u TTE -pt endorsing worsening LE edema and SOB since being d/c from hospital off of home torsemide   -pt w/ b/l infiltrates on CXR suspicious for pulmonary edema   -PE w/ crackles b/l   -volume overload due to under diuresis i/s/o CKD vs CHF  -pt s/p bumex 4 IV in ED   -nephro c/s; appreciate recs   -bumex gtt 1cc/hr  -strict Is/Os  -daily weights   -f/u proBNP -pt endorsing worsening LE edema and SOB since being d/c from hospital off of home torsemide   -pt w/ b/l infiltrates on CXR suspicious for pulmonary edema   -PE w/ crackles b/l   -volume overload due to under diuresis i/s/o CKD vs CHF  -pt s/p bumex 4 IV in ED   -nephro c/s; appreciate recs   -bumex gtt 1cc/hr  -strict Is/Os  -daily weights   -POCUS w/ grossly intact cardiac function   -f/u proBNP  -f/u TTE -pt endorsing worsening LE edema and SOB since being d/c from hospital off of home torsemide   -pt w/ b/l infiltrates on CXR suspicious for pulmonary edema   -PE w/ crackles b/l   -volume overload due to under diuresis i/s/o CKD vs CHF  -pt s/p bumex 4 IV in ED   -nephro c/s; appreciate recs   -start bumex gtt 2cc/hr  -strict Is/Os  -daily weights   -POCUS w/ grossly intact cardiac function   -f/u proBNP  -f/u TTE -pt endorsing worsening LE edema and SOB since being d/c from hospital off of home torsemide   -pt w/ b/l infiltrates on CXR suspicious for pulmonary edema   -PE w/ crackles b/l   -volume overload due to under diuresis i/s/o CKD vs CHF  -pt s/p bumex 4 IV in ED   -nephro c/s; appreciate recs   -start bumex gtt 2cc/hr, goal urine o/p 1-2L/day  -strict Is/Os  -daily weights   -POCUS w/ grossly intact cardiac function   -f/u proBNP  -f/u TTE

## 2023-02-20 NOTE — ED ADULT NURSE REASSESSMENT NOTE - NS ED NURSE REASSESS COMMENT FT1
MD Youssef request the patient only receive 1G of the 2G Cefepime that was ordered and administered. the 2G Cefepime was stopped after patient received 50mL of the 100mL in the medication bag. MD Youssef aware.

## 2023-02-20 NOTE — H&P ADULT - NSHPSOCIALHISTORY_GEN_ALL_CORE
No smoking hx, no drinking, no recreational drug use. Lives at home w/ kids and grandkids. Does not work.

## 2023-02-20 NOTE — CONSULT NOTE ADULT - ATTENDING COMMENTS
Pt. with CKD and massive nephrotic range proteinuria, presumed to be 2/2 diabetic nephropathy, presenting with hypervolemia and noted to have MIKALA on CKD with rise in BUN/ azotemia without uremia.   Recommend IV diuretics.   No plan for HD at this time however pt. made aware that she may need to start HD if volume status does not improve with IV diuresis.   Monitor BMP. Strict I/Os. Avoid nephrotoxins.     Fany Chavez MD  Office : [phone]  Contact me on [employer] Teams.

## 2023-02-20 NOTE — ED PROVIDER NOTE - PROGRESS NOTE DETAILS
Echocardiography in the ED showed good cardiac function, bilateral pleural effusion, and bilateral B-lines. Patient has a WBC count of 15, and in the setting of an acute worsening of respiratory symptoms possibly pneumonia; plan to give antibiotics empirically. Jeremi Youssef- spoke to hospitalist for admission. nephro recs 4 bumex and gtt.

## 2023-02-20 NOTE — H&P ADULT - ATTENDING COMMENTS
This is a 52F with h/o recent MIKALA on CKD 3/4, HTN, Pulmonary sarcoidosis, poorly controlled T2DM p/w SOB and LE edema. No c/o chest pain, fever, cough, sick contact or recent travel.  She was discharged from hospital in Jan 2023, was off of home torsemide and losartan. Upon f/u with Renal outpatient Torsemide was added 20mg qD  but she did not notice any increase in urination frequency rather she felt worsening LE swelling and SOB. Her renal function was worsening and she came to ED.    In ED BP was 126/79, afebrile, O2 96-98% RA. CXR showed b/l hazy opacity->pulmonary edema with small pleural effusion. WBC 15. She received Bumex 4mg IV x1& IV anbx    1. Worsening MIKALA on CKD 4, likely 2/2 diabetic nephropathy  2. Leukocytosis, less likely infection  3. Pulmonary Sarcoidosis on steroid  4. HTN  5. Poorly controlled T2DM    - O2 sat 94-98% RA, reviewed CXR->pulmonary edema, mild b/l pleural effusion, elevated Bunr 102/5.1 from 46/4.7.   - Spoke to Renal attending-> will start Bumex gtt at 2mg/hr, daily weight, I/O to net - 1-2L/d, held ACE/ARB.   - No need of renal biopsy per Renal. Outpatient w/u done. May need to prepare HD for near future. Also can be a renal transplant candidate for future  - would not continue with IV anbx, less suspicion for Infectious process. Would rather f/u blood and urine c/s. Elevated WBC is likely from worsening Renal failure   - Would get an Echo, c/w Coreg, amlodipine  - c/w Home dose prednisone 10mg/d for Sarcoidosis   - HbA1C 10.6%, c/w Lantus 20u qhs and Admelog SS, Diabetic renal diet  - DVT ppx This is a 52F with h/o recent MIKALA on CKD 3/4, HTN, Pulmonary sarcoidosis, poorly controlled T2DM p/w SOB and LE edema. No c/o chest pain, fever, cough, sick contact or recent travel.  She was discharged from hospital in Jan 2023, was off of home torsemide and losartan. Upon f/u with Renal outpatient Torsemide was added 20mg qD  but she did not notice any increase in urination frequency rather she felt worsening LE swelling and SOB. Her renal function was worsening and she came to ED.    In ED BP was 126/79, afebrile, O2 96-98% RA. CXR showed b/l hazy opacity->pulmonary edema with small pleural effusion. WBC 15. She received Bumex 4mg IV x1& IV anbx    1. Worsening MIKALA on CKD 4, likely 2/2 diabetic nephropathy  2. Leukocytosis, less likely infection  3. Pulmonary Sarcoidosis on steroid  4. HTN  5. Poorly controlled T2DM with proteinuria    - O2 sat 94-98% RA, reviewed CXR->pulmonary edema, mild b/l pleural effusion, elevated Bunr 102/5.1 from 46/4.7.   - Spoke to Renal attending-> will start Bumex gtt at 2mg/hr, daily weight, I/O to net - 1-2L/d, held ACE/ARB.   - No need of renal biopsy per Renal. Outpatient w/u done. May need to prepare HD for near future. Also can be a renal transplant candidate for future  - would not continue with IV anbx, less suspicion for Infectious process. Would rather f/u blood and urine c/s. Elevated WBC is likely from worsening Renal failure   - Would get an Echo, c/w Coreg, amlodipine, Hydral  - c/w Home dose prednisone 10mg/d for Sarcoidosis   - HbA1C 10.6%, c/w Lantus 20u qhs and Admelog SS, Diabetic renal diet  - DVT ppx

## 2023-02-20 NOTE — H&P ADULT - NSHPPHYSICALEXAM_GEN_ALL_CORE
VITALS:   T(C): 36.9 (02-20-23 @ 08:46), Max: 36.9 (02-20-23 @ 08:46)  HR: 86 (02-20-23 @ 12:21) (86 - 90)  BP: 132/82 (02-20-23 @ 12:21) (126/79 - 132/82)  RR: 22 (02-20-23 @ 12:21) (22 - 28)  SpO2: 98% (02-20-23 @ 12:21) (94% - 98%)    GENERAL: uncomfortable  HEAD:  Atraumatic, Normocephalic  EYES: EOMI, PERRLA, conjunctiva and sclera clear  ENT: Moist mucous membranes  NECK: Supple, No JVD, no anterior cervical, submandibular or axillary lymphadenopathy  CHEST/LUNG: b/l crackles   HEART: RRR. No M/R/G  ABDOMEN: Soft, nontender, non-distended, normoactive BS. No hepatomegaly  EXTREMITIES:  2+ Peripheral Pulses, brisk capillary refill. 2+ b/l LE edema   NERVOUS SYSTEM:  Alert & Oriented X3, speech clear. No deficits, CN II-XII intact. Normal sensation   MSK: FROM all 4 extremities, full and equal strength  PSYCH: Normal affect, normal speech, normal behavior  SKIN: No rashes or lesions

## 2023-02-20 NOTE — ED PROVIDER NOTE - ATTENDING CONTRIBUTION TO CARE
I, Austin Keys, performed a history and physical exam of the patient and discussed their management with the resident, student, and/or fellow. I reviewed the note and agree with the documented findings and plan of care. I was present and available for all procedures.

## 2023-02-20 NOTE — ED ADULT NURSE NOTE - OBJECTIVE STATEMENT
52 y f came to the ed c/o sob. patient says she has had increasing sob over the last week. has hx of CKD and is planning to start dialysis soon. says her MD started her on PO lasix about 1 week ago and symptoms have not improved instead have gotten worse. called her MD today who recommend she come to the ed for further management. patient also endorses worsening lower extremity edema. patient is currently a/ox3. denies fevers, chills, cough, chest pain. patient is tachypneic. abdomen is soft and nontender. skin is warm and dry. +1 pitting edema in lower extremities.

## 2023-02-20 NOTE — H&P ADULT - PROBLEM SELECTOR PLAN 3
-pt w/ sCr 5.15 on presentation   -pt w/ baseline CKD stage 5 i/s/o uncontrolled DM  -during last hospitalization baseline sCr around 4.9  -worsening renal function possibly i/s/o volume overload   -f/u FeUrea and U/A -pt w/ sCr 5.15 on presentation   -pt w/ baseline CKD stage 5 i/s/o uncontrolled DM  -during last hospitalization baseline sCr around 4.9  -worsening renal function possibly i/s/o volume overload   -f/u U/A, FeUrea, urine lytes, spot TP/CR

## 2023-02-20 NOTE — ED PROVIDER NOTE - OBJECTIVE STATEMENT
Pt is a 52y female with history of CKD, HTN, sarcoidosis, DMII, who was recently discharged from the hospital and now returns with worsening lower extremity swelling and new shortness of breath for three days. She was admitted for management of lower extremity pain, diagnosed as diabetic neuropathy iso uncontrolled DM, and worsening kidney function. Since discharge, she has noticed increased leg swelling; she spoke with her nephrologist Dr. Jose M Garibay who recommended she restart torsemide at home at 20mg qd one week ago. She did not notice an increase in urinary frequency or volume. Over the weekend three days ago she began having shortness of breath at rest, which has worsened. She presents now short of breath, with no chest pain, no abdominal pain, nausea, or vomiting. She has bilateral lower extremity pain, unchanged from baseline.

## 2023-02-20 NOTE — ED PROVIDER NOTE - NS ED ROS FT
Gen: No fever, normal appetite  Eyes: No eye irritation or discharge  ENT: No earpain, congestion, sore throat  Resp: +SOB, No cough  Cardiovascular: No chest pain or palpitation  Gastroenteric: No nausea/vomiting, diarrhea, constipation  : No dysuria  MS: No joint or muscle pain  Skin: No rashes  Neuro: No headache  Remainder negative, except as per the HPI

## 2023-02-20 NOTE — H&P ADULT - PROBLEM SELECTOR PLAN 5
-pt w/ home regimen lantus 36U and ademolog 10U premeal  -will start lantus 18U and CANDI -pt w/ home regimen lantus 36U and ademolog 10U premeal  -will start lantus 20U and CANDI  -A1c 10.6 in January

## 2023-02-20 NOTE — H&P ADULT - PROBLEM SELECTOR PLAN 1
-pt w/ SIRS criteria w/ tachypnea and leukocytosis to 15 on admission w/ new O2 requirement   -CXR w/ b/l infiltrates suspicious for pulmonary edema vs infection   -pt w/ lactate wnl   -s/p cefepime and azithro in ED   -likely i/s/o volume overload rather than infection   -pt s/p bumex 4 IV  -diuresis as below -pt w/ SIRS criteria w/ tachypnea and leukocytosis to 15 on admission w/ new O2 requirement   -CXR w/ b/l infiltrates suspicious for pulmonary edema vs infection   -pt w/ lactate wnl   -s/p cefepime and azithro in ED   -will hold abx given low suspicion for inf   -f/u blood cx   -likely i/s/o volume overload rather than infection   -pt s/p bumex 4 IV  -diuresis as below

## 2023-02-20 NOTE — H&P ADULT - NSHPREVIEWOFSYSTEMS_GEN_ALL_CORE
REVIEW OF SYSTEMS      General: no fever, no chills  Skin: no rashes  ENMT: no dry mouth, no swelling	  Respiratory: +SOB, no cough, no wheezing  Cardiovascular:  no chest pain, no palpitations   Gastrointestinal: no abdominal pain, no diarrhea, no N/V  Genitourinary: no dysuria, no hematuria   Musculoskeletal: no joint pain, + LE edema  Neurological: no HA, no change in vision 	  Hematology: no easy bruising

## 2023-02-20 NOTE — CONSULT NOTE ADULT - PROBLEM SELECTOR RECOMMENDATION 9
Pt. with MIKALA on advanced CKD likely in setting of volume overload and progression of her underlying disease. On review of [facility] pt noted to have a SCr of 4.77 1/30/23 and on arrival today elevated to 5.15. Primary nephrologist is Dr. Jose M aGribay, last seen in office 2/15/23. CKD thought to be in setting of advanced diabetic nephropathy. Send UA, urine electrolytes, spot urine TP/CR (previously elevated to 22.6g). Labs reviewed, pt clinically volume overloaded. At this time recommend one time dose of IV bumex 4mg followed by initiation of a bumex infusion. No acute indication for RRT at this time, however explained to patient that should her renal function worsen or unable to improve her volume status then there is a chance she may require it. Monitor labs and urine output. Avoid nephrotoxins. Dose medications as per eGFR.    If any questions, please feel free to contact me     Doug Stewart  Nephrology Fellow  [facility] Pager: [phone]

## 2023-02-20 NOTE — ED PROVIDER NOTE - PHYSICAL EXAMINATION
PHYSICAL EXAM:  GENERAL: Uncomfortable, breathing quickly  HEAD:  Atraumatic, Normocephalic  EYES: EOMI, conjunctiva and sclera clear  NECK: Supple, no lymphadenopathy, no JVD  CHEST/LUNG: Tachypneic, CTAB in all lung fields anteriorly and posteriorly; No wheezes, rales, or rhonchi  HEART: Tachycardic rate and normal rhythm. Normal S1/S2. No murmurs, rubs, or gallops  ABDOMEN: Soft, non-tender, non-distended; normal bowel sounds, no organomegaly  EXTREMITIES:  2+ peripheral pulses b/l, 1+ edema in b/l lower extremities, pain on palpation of legs (patient reports not new)  NEUROLOGY: A&O x 3, no focal deficits  SKIN: No rashes or lesions

## 2023-02-20 NOTE — H&P ADULT - ASSESSMENT
52 y.o. PMH CKD, HTN, sarcoidosis, DMII who presents w/ SOB and LE edema. Labs and VSS significant for leukocytosis, tachypnea and new O2 requirement and imaging consistent w/ b/l infiltrates suggestive of pulm edema vs infection. Pt admitted for AHRF likely i/s/o volume overload 2/2 CHF.  52 y.o. PMH CKD, HTN, sarcoidosis, DMII who presents w/ SOB and LE edema. Labs and VSS significant for leukocytosis, tachypnea and new O2 requirement and imaging consistent w/ b/l infiltrates suggestive of pulm edema vs infection. Pt admitted for AHRF likely i/s/o volume overload due to underdiuresis i/s/o CKD vs CHF.

## 2023-02-20 NOTE — ED ADULT NURSE NOTE - COVID-19  TEST TYPE
C/o L hip pain that shoots down leg since Sunday. Denies injury. States pain is worse with any movement. 6 years ago, had similar pain in bilateral hips. Was tested for rheumatoid arthritis which was negative and was told they don't know what it is.
MOLECULAR PCR

## 2023-02-20 NOTE — H&P ADULT - NSHPLABSRESULTS_GEN_ALL_CORE
8.0    15.41 )-----------( 290      ( 20 Feb 2023 09:04 )             24.2     02-20    137  |  103  |  102<H>  ----------------------------<  150<H>  5.2   |  15<L>  |  5.15<H>    Ca    7.7<L>      20 Feb 2023 09:04    TPro  6.2  /  Alb  3.5  /  TBili  0.2  /  DBili  x   /  AST  13  /  ALT  16  /  AlkPhos  43  02-20          LIVER FUNCTIONS - ( 20 Feb 2023 09:04 )  Alb: 3.5 g/dL / Pro: 6.2 g/dL / ALK PHOS: 43 U/L / ALT: 16 U/L / AST: 13 U/L / GGT: x             PT/INR - ( 20 Feb 2023 09:04 )   PT: 9.8 sec;   INR: 0.85 ratio         PTT - ( 20 Feb 2023 09:04 )  PTT:25.1 sec

## 2023-02-20 NOTE — H&P ADULT - HISTORY OF PRESENT ILLNESS
52 y.o. PMH CKD, HTN, sarcoidosis, DMII here w/ SOB and LE edema. Pt endorses start of LE swelling shortly after d/c from hospital. Pt's home torsemide and losartan was discontinued after discharge. Over time, pt's LE edema worsened. Last monday, pt started Torsemide 5mg qD which was increased to 20mg qD on Wednesday after seeing nephrologist. Pt reports that she did not notice any increase in urination frequency after starting torsemide. Pt states that she started feeling SOB since Friday. She states that she feels SOB even at rest. She has had to use multiple pillows when she sleeps or lays down. She also endorses waking up multiple times in the night SOB.     In the ED, pt tachypneic w/ RR 28, /76, HR 89 and 98.4F. Pt started on 3L NC for comfort w/ subsequent improvement in tachypnea.  52 y.o. PMH CKD, HTN, sarcoidosis, DMII here w/ SOB and LE edema. Pt endorses start of LE swelling shortly after d/c from hospital. Pt's home torsemide and losartan was discontinued after discharge. Over time, pt's LE edema worsened. Last monday, pt started Torsemide 5mg qD which was increased to 20mg qD on Wednesday after seeing nephrologist. Pt reports that she did not notice any increase in urination frequency after starting torsemide. Pt states that she started feeling SOB since Friday. She states that she feels SOB even at rest. She has had to use multiple pillows when she sleeps or lays down. She also endorses waking up multiple times in the night SOB. Pt has never had LE this significant in past and has not been this SOB at rest before.     In the ED, pt tachypneic w/ RR 28, /76, HR 89 and 98.4F. Pt started on 3L NC for comfort w/ subsequent improvement in tachypnea. Pt s/p 4mg bumex IV.  52 y.o. PMH CKD, HTN, sarcoidosis, DMII who p/w SOB and LE edema. Pt endorses start of LE swelling shortly after d/c from hospital. Pt's home torsemide and losartan was discontinued after discharge. Over time, pt's LE edema worsened. Last monday, pt started Torsemide 5mg qD which was increased to 20mg qD on Wednesday after seeing nephrologist. Pt reports that she did not notice any increase in urination frequency after starting torsemide. Pt states that she started feeling SOB since Friday. She states that she feels SOB even at rest. She has had to use multiple pillows when she sleeps or lays down. She also endorses waking up multiple times in the night SOB. Pt has never had LE this significant in past and has not been this SOB at rest before.     In the ED, pt tachypneic w/ RR 28, /76, HR 89 and 98.4F. Pt started on 3L NC for comfort w/ subsequent improvement in tachypnea. Pt w/ leukocytosis and tachypnea meeting SIRS criteria. Pt s/p cefepime and azithro. Pt s/p 4mg bumex IV.  52 y.o. PMH CKD, HTN, sarcoidosis, DMII who p/w SOB and LE edema. Pt endorses start of LE swelling shortly after d/c from hospital. Pt's home torsemide and losartan was discontinued after discharge. Over time, pt's LE edema worsened. Last monday, pt started Torsemide 5mg qD which was increased to 20mg qD on Wednesday after seeing nephrologist. Pt reports that she did not notice any increase in urination frequency after starting torsemide. Pt states that she started feeling SOB since Friday. She states that she feels SOB even at rest. She has had to use multiple pillows when she sleeps or lays down. She also endorses waking up multiple times in the night SOB. Pt has never had LE this significant in past and has not been this SOB at rest before. Pt denies cough, fever, sick contacts.     In the ED, pt tachypneic w/ RR 28, /76, HR 89 and 98.4F. Pt started on 3L NC for comfort w/ subsequent improvement in tachypnea. Pt w/ leukocytosis and tachypnea meeting SIRS criteria. Pt s/p cefepime and azithro. Pt s/p 4mg bumex IV.

## 2023-02-20 NOTE — CONSULT NOTE ADULT - SUBJECTIVE AND OBJECTIVE BOX
[facility] DIVISION OF KIDNEY DISEASES AND HYPERTENSION -- [phone]  -- INITIAL CONSULT NOTE  --------------------------------------------------------------------------------  HPI:  Patient is a 52 year old female with PMH of Sarcoidosis, DM2, HTN and CKD stage 5 who presents to the hospital with worsening LE edema and shortness of breath. The nephrology team was consulted for advanced kidney disease and volume overload. On review of [facility] pt noted to have a SCr of 4.77 1/30/23 and on arrival today elevated to 5.15. Of note the patient was recently admitted to [facility] after presenting with LE pain and shortness of breath as well. The patient follows with Dr Jose M Garibay as her primary nephrologist, last seen in the office 2/15/23. At that time she was advised to increase her diuretics to Torsemide 20mg daily and states she has been adherent. However over the course of the past few days she noticed worsening edema and shortness of breath. She states the breathing worsens when she lays down flat. She admits she has been urinating well without issues. She denied any chest pain, nausea, vomiting, abdominal pain, change in appetite or taste.      PAST HISTORY  --------------------------------------------------------------------------------  PAST MEDICAL & SURGICAL HISTORY:  Sarcoidosis      Diabetes      Hypertension      Hyperlipidemia      Chronic kidney disease (CKD), stage III (moderate)      Seizure disorder      No significant past surgical history        FAMILY HISTORY:  Family history of diabetes mellitus in first degree relative      PAST SOCIAL HISTORY:    ALLERGIES & MEDICATIONS  --------------------------------------------------------------------------------  Allergies    No Known Allergies    Intolerances      Standing Inpatient Medications  acetaminophen   IVPB .. 1000 milliGRAM(s) IV Intermittent Once    PRN Inpatient Medications      REVIEW OF SYSTEMS  All other systems were reviewed and are negative, except as noted.    VITALS/PHYSICAL EXAM  --------------------------------------------------------------------------------  T(C): 36.9 (02-20-23 @ 08:46), Max: 36.9 (02-20-23 @ 08:46)  HR: 89 (02-20-23 @ 08:46) (89 - 90)  BP: 131/76 (02-20-23 @ 08:46) (126/79 - 131/76)  RR: 28 (02-20-23 @ 08:46) (22 - 28)  SpO2: 94% (02-20-23 @ 08:46) (94% - 95%)  Wt(kg): --  Height (cm): 154.9 (02-20-23 @ 08:18)  Weight (kg): 54.4 (02-20-23 @ 08:18)  BMI (kg/m2): 22.7 (02-20-23 @ 08:18)  BSA (m2): 1.52 (02-20-23 @ 08:18)      Physical Exam:  	Gen: ill appearing  	HEENT: MMM  	Pulm: crackles in lung bases  	CV: S1S2  	Abd: Soft, +BS   	Ext: ++ LE edema B/L  	Neuro: Awake and alert   	Skin: Warm and dry    LABS/STUDIES  --------------------------------------------------------------------------------              8.0    15.41 >-----------<  290      [02-20-23 @ 09:04]              24.2     137  |  103  |  102  ----------------------------<  150      [02-20-23 @ 09:04]  5.2   |  15  |  5.15        Ca     7.7     [02-20-23 @ 09:04]    TPro  6.2  /  Alb  3.5  /  TBili  0.2  /  DBili  x   /  AST  13  /  ALT  16  /  AlkPhos  43  [02-20-23 @ 09:04]    PT/INR: PT 9.8  , INR 0.85       [02-20-23 @ 09:04]  PTT: 25.1       [02-20-23 @ 09:04]      Creatinine Trend:  SCr 5.15 [02-20 @ 09:04]  SCr 4.77 [01-30 @ 07:10]  SCr 4.56 [01-29 @ 07:01]  SCr 4.89 [01-28 @ 07:29]  SCr 4.60 [01-27 @ 06:38]    Urinalysis - [01-26-23 @ 00:42]      Color Light Yellow / Appearance Clear / SG 1.016 / pH 6.5      Gluc 1000 mg/dL / Ketone Negative  / Bili Negative / Urobili Negative       Blood Moderate / Protein >600 / Leuk Est Negative / Nitrite Negative      RBC 11 / WBC 2 / Hyaline 3 / Gran  / Sq Epi  / Non Sq Epi 2 / Bacteria Negative      Iron 48, TIBC 267, %sat 18      [01-31-23 @ 12:34]  Ferritin 58      [01-31-23 @ 12:34]  PTH -- (Ca 8.1)      [01-31-23 @ 12:34]   98  Vitamin D (25OH) 7.6      [01-27-23 @ 18:12]  HbA1c 11.2      [08-09-19 @ 21:20]  TSH 1.71      [01-28-23 @ 07:29]    HIV Nonreactive The HIV Ag/Ab Combo test performed screens for HIV-1 p24  antigen, antibodies to HIV-1 (group M and group O), and  antibodies to HIV-2. All specimens repeatedly reactive  will reflex to an HIV 1/2 antibody confirmation and  differentiation test. This assay detects p24 antigen which  may be present prior to the development of HIV antibodies,  therefore a reactive result with a negative HIV 1/2 AB  Confirmation should be followed up with HIV-1 RNA, HIV-2  RNA and repeat testing in 4-8 weeks. A nonreactive result  does not preclude previous exposure to or infection with  HIV-1 or HIV-2. [facility] prohibits disclosure of this  result to any unauthorized party.      [01-29-19 @ 06:30]    JOSLYN: titer Negative, pattern --      [01-28-23 @ 07:29]  dsDNA <12      [01-28-23 @ 07:29]

## 2023-02-21 LAB
ALBUMIN SERPL ELPH-MCNC: 3.3 G/DL — SIGNIFICANT CHANGE UP (ref 3.3–5)
ALP SERPL-CCNC: 43 U/L — SIGNIFICANT CHANGE UP (ref 40–120)
ALT FLD-CCNC: 14 U/L — SIGNIFICANT CHANGE UP (ref 10–45)
ANION GAP SERPL CALC-SCNC: 16 MMOL/L — SIGNIFICANT CHANGE UP (ref 5–17)
ANION GAP SERPL CALC-SCNC: 20 MMOL/L — HIGH (ref 5–17)
AST SERPL-CCNC: 10 U/L — SIGNIFICANT CHANGE UP (ref 10–40)
BASOPHILS # BLD AUTO: 0.04 K/UL — SIGNIFICANT CHANGE UP (ref 0–0.2)
BASOPHILS NFR BLD AUTO: 0.3 % — SIGNIFICANT CHANGE UP (ref 0–2)
BILIRUB SERPL-MCNC: 0.2 MG/DL — SIGNIFICANT CHANGE UP (ref 0.2–1.2)
BUN SERPL-MCNC: 100 MG/DL — HIGH (ref 7–23)
BUN SERPL-MCNC: 99 MG/DL — HIGH (ref 7–23)
CALCIUM SERPL-MCNC: 8.1 MG/DL — LOW (ref 8.4–10.5)
CALCIUM SERPL-MCNC: 8.1 MG/DL — LOW (ref 8.4–10.5)
CHLORIDE SERPL-SCNC: 100 MMOL/L — SIGNIFICANT CHANGE UP (ref 96–108)
CHLORIDE SERPL-SCNC: 97 MMOL/L — SIGNIFICANT CHANGE UP (ref 96–108)
CHOLEST SERPL-MCNC: 254 MG/DL — HIGH
CO2 SERPL-SCNC: 17 MMOL/L — LOW (ref 22–31)
CO2 SERPL-SCNC: 19 MMOL/L — LOW (ref 22–31)
CREAT SERPL-MCNC: 5.31 MG/DL — HIGH (ref 0.5–1.3)
CREAT SERPL-MCNC: 5.52 MG/DL — HIGH (ref 0.5–1.3)
CULTURE RESULTS: SIGNIFICANT CHANGE UP
EGFR: 9 ML/MIN/1.73M2 — LOW
EGFR: 9 ML/MIN/1.73M2 — LOW
EOSINOPHIL # BLD AUTO: 0.27 K/UL — SIGNIFICANT CHANGE UP (ref 0–0.5)
EOSINOPHIL NFR BLD AUTO: 2.2 % — SIGNIFICANT CHANGE UP (ref 0–6)
GLUCOSE BLDC GLUCOMTR-MCNC: 240 MG/DL — HIGH (ref 70–99)
GLUCOSE BLDC GLUCOMTR-MCNC: 264 MG/DL — HIGH (ref 70–99)
GLUCOSE BLDC GLUCOMTR-MCNC: 289 MG/DL — HIGH (ref 70–99)
GLUCOSE BLDC GLUCOMTR-MCNC: 349 MG/DL — HIGH (ref 70–99)
GLUCOSE BLDC GLUCOMTR-MCNC: 70 MG/DL — SIGNIFICANT CHANGE UP (ref 70–99)
GLUCOSE SERPL-MCNC: 102 MG/DL — HIGH (ref 70–99)
GLUCOSE SERPL-MCNC: 359 MG/DL — HIGH (ref 70–99)
HCT VFR BLD CALC: 24.4 % — LOW (ref 34.5–45)
HDLC SERPL-MCNC: 117 MG/DL — SIGNIFICANT CHANGE UP
HGB BLD-MCNC: 8.2 G/DL — LOW (ref 11.5–15.5)
IMM GRANULOCYTES NFR BLD AUTO: 2.5 % — HIGH (ref 0–0.9)
LIPID PNL WITH DIRECT LDL SERPL: 117 MG/DL — HIGH
LYMPHOCYTES # BLD AUTO: 17.6 % — SIGNIFICANT CHANGE UP (ref 13–44)
LYMPHOCYTES # BLD AUTO: 2.15 K/UL — SIGNIFICANT CHANGE UP (ref 1–3.3)
MAGNESIUM SERPL-MCNC: 2 MG/DL — SIGNIFICANT CHANGE UP (ref 1.6–2.6)
MAGNESIUM SERPL-MCNC: 2.1 MG/DL — SIGNIFICANT CHANGE UP (ref 1.6–2.6)
MCHC RBC-ENTMCNC: 27.8 PG — SIGNIFICANT CHANGE UP (ref 27–34)
MCHC RBC-ENTMCNC: 33.6 GM/DL — SIGNIFICANT CHANGE UP (ref 32–36)
MCV RBC AUTO: 82.7 FL — SIGNIFICANT CHANGE UP (ref 80–100)
MONOCYTES # BLD AUTO: 0.97 K/UL — HIGH (ref 0–0.9)
MONOCYTES NFR BLD AUTO: 7.9 % — SIGNIFICANT CHANGE UP (ref 2–14)
NEUTROPHILS # BLD AUTO: 8.5 K/UL — HIGH (ref 1.8–7.4)
NEUTROPHILS NFR BLD AUTO: 69.5 % — SIGNIFICANT CHANGE UP (ref 43–77)
NON HDL CHOLESTEROL: 137 MG/DL — HIGH
NRBC # BLD: 0 /100 WBCS — SIGNIFICANT CHANGE UP (ref 0–0)
NT-PROBNP SERPL-SCNC: 7907 PG/ML — HIGH (ref 0–300)
OSMOLALITY UR: 339 MOS/KG — SIGNIFICANT CHANGE UP (ref 300–900)
PHOSPHATE SERPL-MCNC: 7.3 MG/DL — HIGH (ref 2.5–4.5)
PHOSPHATE SERPL-MCNC: 8 MG/DL — HIGH (ref 2.5–4.5)
PLATELET # BLD AUTO: 304 K/UL — SIGNIFICANT CHANGE UP (ref 150–400)
POTASSIUM SERPL-MCNC: 4.7 MMOL/L — SIGNIFICANT CHANGE UP (ref 3.5–5.3)
POTASSIUM SERPL-MCNC: 5.5 MMOL/L — HIGH (ref 3.5–5.3)
POTASSIUM SERPL-SCNC: 4.7 MMOL/L — SIGNIFICANT CHANGE UP (ref 3.5–5.3)
POTASSIUM SERPL-SCNC: 5.5 MMOL/L — HIGH (ref 3.5–5.3)
PROT SERPL-MCNC: 6.1 G/DL — SIGNIFICANT CHANGE UP (ref 6–8.3)
RBC # BLD: 2.95 M/UL — LOW (ref 3.8–5.2)
RBC # FLD: 13.1 % — SIGNIFICANT CHANGE UP (ref 10.3–14.5)
SODIUM SERPL-SCNC: 132 MMOL/L — LOW (ref 135–145)
SODIUM SERPL-SCNC: 137 MMOL/L — SIGNIFICANT CHANGE UP (ref 135–145)
SPECIMEN SOURCE: SIGNIFICANT CHANGE UP
TRIGL SERPL-MCNC: 101 MG/DL — SIGNIFICANT CHANGE UP
UUN UR-MCNC: 237 MG/DL — SIGNIFICANT CHANGE UP
WBC # BLD: 12.24 K/UL — HIGH (ref 3.8–10.5)
WBC # FLD AUTO: 12.24 K/UL — HIGH (ref 3.8–10.5)

## 2023-02-21 PROCEDURE — 99233 SBSQ HOSP IP/OBS HIGH 50: CPT | Mod: GC

## 2023-02-21 PROCEDURE — 93306 TTE W/DOPPLER COMPLETE: CPT | Mod: 26

## 2023-02-21 PROCEDURE — 99233 SBSQ HOSP IP/OBS HIGH 50: CPT

## 2023-02-21 RX ORDER — INSULIN GLARGINE 100 [IU]/ML
25 INJECTION, SOLUTION SUBCUTANEOUS AT BEDTIME
Refills: 0 | Status: DISCONTINUED | OUTPATIENT
Start: 2023-02-21 | End: 2023-02-25

## 2023-02-21 RX ORDER — INSULIN LISPRO 100/ML
5 VIAL (ML) SUBCUTANEOUS
Refills: 0 | Status: DISCONTINUED | OUTPATIENT
Start: 2023-02-22 | End: 2023-02-23

## 2023-02-21 RX ORDER — INFLUENZA VIRUS VACCINE 15; 15; 15; 15 UG/.5ML; UG/.5ML; UG/.5ML; UG/.5ML
0.5 SUSPENSION INTRAMUSCULAR ONCE
Refills: 0 | Status: DISCONTINUED | OUTPATIENT
Start: 2023-02-21 | End: 2023-02-25

## 2023-02-21 RX ORDER — IBUPROFEN 200 MG
400 TABLET ORAL ONCE
Refills: 0 | Status: DISCONTINUED | OUTPATIENT
Start: 2023-02-21 | End: 2023-02-21

## 2023-02-21 RX ORDER — INSULIN LISPRO 100/ML
5 VIAL (ML) SUBCUTANEOUS ONCE
Refills: 0 | Status: DISCONTINUED | OUTPATIENT
Start: 2023-02-21 | End: 2023-02-21

## 2023-02-21 RX ORDER — ACETAMINOPHEN 500 MG
325 TABLET ORAL ONCE
Refills: 0 | Status: COMPLETED | OUTPATIENT
Start: 2023-02-21 | End: 2023-02-21

## 2023-02-21 RX ADMIN — ATORVASTATIN CALCIUM 80 MILLIGRAM(S): 80 TABLET, FILM COATED ORAL at 21:54

## 2023-02-21 RX ADMIN — Medication 30 MILLILITER(S): at 12:21

## 2023-02-21 RX ADMIN — HEPARIN SODIUM 5000 UNIT(S): 5000 INJECTION INTRAVENOUS; SUBCUTANEOUS at 13:46

## 2023-02-21 RX ADMIN — CARVEDILOL PHOSPHATE 6.25 MILLIGRAM(S): 80 CAPSULE, EXTENDED RELEASE ORAL at 17:34

## 2023-02-21 RX ADMIN — Medication 4: at 17:37

## 2023-02-21 RX ADMIN — Medication 325 MILLIGRAM(S): at 01:56

## 2023-02-21 RX ADMIN — Medication 650 MILLIGRAM(S): at 10:39

## 2023-02-21 RX ADMIN — BUMETANIDE 10 MG/HR: 0.25 INJECTION INTRAMUSCULAR; INTRAVENOUS at 02:33

## 2023-02-21 RX ADMIN — AMLODIPINE BESYLATE 10 MILLIGRAM(S): 2.5 TABLET ORAL at 05:18

## 2023-02-21 RX ADMIN — Medication 10 MILLIGRAM(S): at 05:18

## 2023-02-21 RX ADMIN — HEPARIN SODIUM 5000 UNIT(S): 5000 INJECTION INTRAVENOUS; SUBCUTANEOUS at 05:18

## 2023-02-21 RX ADMIN — BUMETANIDE 10 MG/HR: 0.25 INJECTION INTRAMUSCULAR; INTRAVENOUS at 05:19

## 2023-02-21 RX ADMIN — Medication 50 MILLILITER(S): at 00:40

## 2023-02-21 RX ADMIN — INSULIN HUMAN 5 UNIT(S): 100 INJECTION, SOLUTION SUBCUTANEOUS at 00:51

## 2023-02-21 RX ADMIN — Medication 650 MILLIGRAM(S): at 01:04

## 2023-02-21 RX ADMIN — Medication 100 MILLIGRAM(S): at 13:46

## 2023-02-21 RX ADMIN — HEPARIN SODIUM 5000 UNIT(S): 5000 INJECTION INTRAVENOUS; SUBCUTANEOUS at 21:50

## 2023-02-21 RX ADMIN — Medication 1 SPRAY(S): at 05:17

## 2023-02-21 RX ADMIN — Medication 1 SPRAY(S): at 17:36

## 2023-02-21 RX ADMIN — INSULIN GLARGINE 25 UNIT(S): 100 INJECTION, SOLUTION SUBCUTANEOUS at 22:55

## 2023-02-21 RX ADMIN — BUMETANIDE 10 MG/HR: 0.25 INJECTION INTRAMUSCULAR; INTRAVENOUS at 21:50

## 2023-02-21 RX ADMIN — FAMOTIDINE 20 MILLIGRAM(S): 10 INJECTION INTRAVENOUS at 12:19

## 2023-02-21 RX ADMIN — BUMETANIDE 10 MG/HR: 0.25 INJECTION INTRAMUSCULAR; INTRAVENOUS at 17:34

## 2023-02-21 RX ADMIN — SODIUM ZIRCONIUM CYCLOSILICATE 5 GRAM(S): 10 POWDER, FOR SUSPENSION ORAL at 00:44

## 2023-02-21 RX ADMIN — Medication 100 MILLIGRAM(S): at 21:55

## 2023-02-21 RX ADMIN — CARVEDILOL PHOSPHATE 6.25 MILLIGRAM(S): 80 CAPSULE, EXTENDED RELEASE ORAL at 05:16

## 2023-02-21 RX ADMIN — Medication 3: at 12:17

## 2023-02-21 RX ADMIN — BUMETANIDE 10 MG/HR: 0.25 INJECTION INTRAMUSCULAR; INTRAVENOUS at 09:33

## 2023-02-21 RX ADMIN — Medication 100 MILLIGRAM(S): at 05:17

## 2023-02-21 NOTE — PROGRESS NOTE ADULT - PROBLEM SELECTOR PLAN 3
-pt w/ sCr 5.15 on presentation   -pt w/ baseline CKD stage 5 i/s/o uncontrolled DM  -during last hospitalization baseline sCr around 4.9  -worsening renal function possibly i/s/o volume overload   -f/u U/A, FeUrea, urine lytes, spot TP/CR -pt w/ sCr 5.15 on presentation   -pt w/ baseline CKD stage 5 i/s/o uncontrolled DM  -during last hospitalization baseline sCr around 4.9  -worsening renal function possibly i/s/o volume overload   -f/u U/A, FeUrea, urine lytes, spot TP/CR  -will get AVF inpt per nephrology recommendations

## 2023-02-21 NOTE — PROGRESS NOTE ADULT - PROBLEM SELECTOR PLAN 5
-pt w/ home regimen lantus 36U and ademolog 10U premeal  -will start lantus 20U and CANDI  -A1c 10.6 in January

## 2023-02-21 NOTE — PROGRESS NOTE ADULT - PROBLEM SELECTOR PLAN 1
Pt. with MIKALA on advanced CKD likely in setting of volume overload and progression of her underlying disease. On review of [facility] pt noted to have a SCr of 4.77 1/30/23 and trended to 5.3 today. Primary nephrologist is Dr. Jose M Garibay, last seen in office 2/15/23. CKD thought to be in setting of advanced diabetic nephropathy. Send UA, urine electrolytes, spot urine TP/CR (previously elevated to 22.6g). Labs reviewed, pt clinically volume overloaded. C/w Bumex gtt. No acute indication for RRT at this time, however explained to patient that should her renal function worsen or unable to improve her volume status then there is a chance she may require it. It would be beneficial for patient to get AVF inpatient. Will continue to discuss with patient.     Monitor labs and strict urine output. Avoid nephrotoxins. Dose medications as per eGFR.    If you have any questions, please feel free to contact me  Lito Oglesby  Nephrology Fellow  [phone]; Prefer [employer] TEAMS  (After 5pm or on weekends please page the on-call fellow)

## 2023-02-21 NOTE — PATIENT PROFILE ADULT - FALL HARM RISK - UNIVERSAL INTERVENTIONS
Bed in lowest position, wheels locked, appropriate side rails in place/Call bell, personal items and telephone in reach/Instruct patient to call for assistance before getting out of bed or chair/Non-slip footwear when patient is out of bed/[place] to call system/Physically safe environment - no spills, clutter or unnecessary equipment/Purposeful Proactive Rounding/Room/bathroom lighting operational, light cord in reach

## 2023-02-21 NOTE — PROGRESS NOTE ADULT - PROBLEM SELECTOR PLAN 1
-pt w/ SIRS criteria w/ tachypnea and leukocytosis to 15 on admission w/ new O2 requirement   -CXR w/ b/l infiltrates suspicious for pulmonary edema vs infection   -pt w/ lactate wnl   -s/p cefepime and azithro in ED   -will hold abx given low suspicion for inf   -f/u blood cx   -likely i/s/o volume overload rather than infection   -pt s/p bumex 4 IV  -diuresis as below

## 2023-02-21 NOTE — PROGRESS NOTE ADULT - SUBJECTIVE AND OBJECTIVE BOX
[facility] DIVISION OF KIDNEY DISEASES AND HYPERTENSION -- FOLLOW UP NOTE  --------------------------------------------------------------------------------  HPI:  Patient is a 52 year old female with PMH of Sarcoidosis, DM2, HTN and CKD stage 5 who presents to the hospital with worsening LE edema and shortness of breath. The nephrology team was consulted for advanced kidney disease and volume overload. On review of [facility] pt noted to have a SCr of 4.77 1/30/23 and on arrival today elevated to 5.15. Of note the patient was recently admitted to [facility] after presenting with LE pain and shortness of breath as well. The patient follows with Dr Jose M Garibay as her primary nephrologist, last seen in the office 2/15/23. At that time she was advised to increase her diuretics to Torsemide 20mg daily and states she has been adherent. However over the course of the past few days she noticed worsening edema and shortness of breath. She states the breathing worsens when she lays down flat. She admits she has been urinating well without issues. She denied any chest pain, nausea, vomiting, abdominal pain, change in appetite or taste.      Pt. seen this AM. Endorses feeling better with breathing. Denies any tremors. Some what decreased appetite but no nausea or vomiting. Remains on Bumex gtt. Endorses good UOP. Discussed dialysis with [facility] but she is hesistant because she is awaiting transplant. Endorses donors but none have started the evaluation process yet. Discussed with Pt. that she may require HD before she is able to be transplanted.       PAST HISTORY  --------------------------------------------------------------------------------  No significant changes to PMH, PSH, FHx, SHx, unless otherwise noted    ALLERGIES & MEDICATIONS  --------------------------------------------------------------------------------  Allergies    No Known Allergies    Intolerances      Standing Inpatient Medications  amLODIPine   Tablet 10 milliGRAM(s) Oral daily  atorvastatin 80 milliGRAM(s) Oral at bedtime  buMETAnide Infusion 2 mG/Hr IV Continuous <Continuous>  carvedilol 6.25 milliGRAM(s) Oral every 12 hours  dextrose 5%. 1000 milliLiter(s) IV Continuous <Continuous>  dextrose 5%. 1000 milliLiter(s) IV Continuous <Continuous>  dextrose 50% Injectable 25 Gram(s) IV Push once  dextrose 50% Injectable 12.5 Gram(s) IV Push once  dextrose 50% Injectable 25 Gram(s) IV Push once  famotidine    Tablet 20 milliGRAM(s) Oral daily  fluticasone propionate 50 MICROgram(s)/spray Nasal Spray 1 Spray(s) Both Nostrils two times a day  glucagon  Injectable 1 milliGRAM(s) IntraMuscular once  heparin   Injectable 5000 Unit(s) SubCutaneous every 8 hours  hydrALAZINE 100 milliGRAM(s) Oral three times a day  insulin glargine Injectable (LANTUS) 20 Unit(s) SubCutaneous at bedtime  insulin lispro (ADMELOG) corrective regimen sliding scale   SubCutaneous three times a day before meals  insulin lispro (ADMELOG) corrective regimen sliding scale   SubCutaneous at bedtime  predniSONE   Tablet 10 milliGRAM(s) Oral daily    PRN Inpatient Medications  acetaminophen     Tablet .. 650 milliGRAM(s) Oral every 6 hours PRN  aluminum hydroxide/magnesium hydroxide/simethicone Suspension 30 milliLiter(s) Oral every 4 hours PRN  dextrose Oral Gel 15 Gram(s) Oral once PRN  melatonin 3 milliGRAM(s) Oral at bedtime PRN  ondansetron Injectable 4 milliGRAM(s) IV Push every 8 hours PRN      REVIEW OF SYSTEMS  --------------------------------------------------------------------------------  As per HPI    VITALS/PHYSICAL EXAM  --------------------------------------------------------------------------------  T(C): 36.9 (02-21-23 @ 04:38), Max: 37 (02-20-23 @ 17:45)  HR: 92 (02-21-23 @ 04:38) (76 - 93)  BP: 161/79 (02-21-23 @ 04:38) (132/82 - 170/76)  RR: 22 (02-21-23 @ 04:38) (20 - 22)  SpO2: 100% (02-21-23 @ 04:38) (96% - 100%)  Wt(kg): --  Height (cm): 154.9 (02-20-23 @ 08:18)  Weight (kg): 54.4 (02-20-23 @ 08:18)  BMI (kg/m2): 22.7 (02-20-23 @ 08:18)  BSA (m2): 1.52 (02-20-23 @ 08:18)      Physical Exam:  	Gen: ill appearing  	HEENT: MMM  	Pulm: faint crackles in axilla  	CV: S1S2  	Abd: Soft, +BS   	Ext: + LE edema B/L  	Neuro: Awake and alert   	Skin: Warm and dry      LABS/STUDIES  --------------------------------------------------------------------------------              8.2    12.24 >-----------<  304      [02-21-23 @ 07:58]              24.4     137  |  100  |  100  ----------------------------<  102      [02-21-23 @ 07:58]  4.7   |  17  |  5.31        Ca     8.1     [02-21-23 @ 07:58]      Mg     2.0     [02-21-23 @ 07:58]      Phos  7.3     [02-21-23 @ 07:58]    TPro  6.1  /  Alb  3.3  /  TBili  0.2  /  DBili  x   /  AST  10  /  ALT  14  /  AlkPhos  43  [02-21-23 @ 07:58]    PT/INR: PT 9.8  , INR 0.85       [02-20-23 @ 09:04]  PTT: 25.1       [02-20-23 @ 09:04]      Creatinine Trend:  SCr 5.31 [02-21 @ 07:58]  SCr 5.25 [02-20 @ 22:52]  SCr 5.15 [02-20 @ 09:04]  SCr 4.77 [01-30 @ 07:10]  SCr 4.56 [01-29 @ 07:01]    Urinalysis - [02-20-23 @ 15:05]      Color Colorless / Appearance Clear / SG 1.010 / pH 6.5      Gluc 200 mg/dL / Ketone Negative  / Bili Negative / Urobili Negative       Blood Negative / Protein 300 mg/dL / Leuk Est Negative / Nitrite Negative      RBC 2 / WBC 2 / Hyaline 1 / Gran  / Sq Epi  / Non Sq Epi 2 / Bacteria Negative    Urine Creatinine 18      [02-20-23 @ 22:52]  Urine Protein 229      [02-20-23 @ 22:52]  Urine Sodium 100      [02-20-23 @ 22:52]  Urine Urea Nitrogen 237      [02-20-23 @ 22:52]  Urine Potassium 26      [02-20-23 @ 22:52]  Urine Osmolality 339      [02-20-23 @ 22:52]    Iron 48, TIBC 267, %sat 18      [01-31-23 @ 12:34]  Ferritin 58      [01-31-23 @ 12:34]  PTH -- (Ca 8.1)      [01-31-23 @ 12:34]   98  Vitamin D (25OH) 7.6      [01-27-23 @ 18:12]  HbA1c 11.2      [08-09-19 @ 21:20]  TSH 1.71      [01-28-23 @ 07:29]      JOSLYN: titer Negative, pattern --      [01-28-23 @ 07:29]  dsDNA <12      [01-28-23 @ 07:29]   [facility] DIVISION OF KIDNEY DISEASES AND HYPERTENSION -- FOLLOW UP NOTE  --------------------------------------------------------------------------------  HPI:  Patient is a 52 year old female with PMH of Sarcoidosis, DM2, HTN and CKD stage 5 who presents to the hospital with worsening LE edema and shortness of breath. The nephrology team was consulted for advanced kidney disease and volume overload. On review of [facility] pt noted to have a SCr of 4.77 1/30/23 and on arrival today elevated to 5.15. Of note the patient was recently admitted to [facility] after presenting with LE pain and shortness of breath as well. The patient follows with Dr Jose M Garibay as her primary nephrologist, last seen in the office 2/15/23. At that time she was advised to increase her diuretics to Torsemide 20mg daily and states she has been adherent. However over the course of the past few days she noticed worsening edema and shortness of breath. She states the breathing worsens when she lays down flat. She admits she has been urinating well without issues. She denied any chest pain, nausea, vomiting, abdominal pain, change in appetite or taste.      Pt. seen this AM. Endorses feeling better with breathing. Denies any tremors. Some what decreased appetite but no nausea or vomiting. Remains on Bumex gtt. Endorses good UOP. Discussed dialysis with [facility] but she is hesistant because she is awaiting transplant. Endorses donors but none have started the evaluation process yet. Discussed with Pt. that she may require HD before she is able to be transplanted.       PAST HISTORY  --------------------------------------------------------------------------------  No significant changes to PMH, PSH, FHx, SHx, unless otherwise noted    ALLERGIES & MEDICATIONS  --------------------------------------------------------------------------------  Allergies    No Known Allergies    Intolerances      Standing Inpatient Medications  amLODIPine   Tablet 10 milliGRAM(s) Oral daily  atorvastatin 80 milliGRAM(s) Oral at bedtime  buMETAnide Infusion 2 mG/Hr IV Continuous <Continuous>  carvedilol 6.25 milliGRAM(s) Oral every 12 hours  dextrose 5%. 1000 milliLiter(s) IV Continuous <Continuous>  dextrose 5%. 1000 milliLiter(s) IV Continuous <Continuous>  dextrose 50% Injectable 25 Gram(s) IV Push once  dextrose 50% Injectable 12.5 Gram(s) IV Push once  dextrose 50% Injectable 25 Gram(s) IV Push once  famotidine    Tablet 20 milliGRAM(s) Oral daily  fluticasone propionate 50 MICROgram(s)/spray Nasal Spray 1 Spray(s) Both Nostrils two times a day  glucagon  Injectable 1 milliGRAM(s) IntraMuscular once  heparin   Injectable 5000 Unit(s) SubCutaneous every 8 hours  hydrALAZINE 100 milliGRAM(s) Oral three times a day  insulin glargine Injectable (LANTUS) 20 Unit(s) SubCutaneous at bedtime  insulin lispro (ADMELOG) corrective regimen sliding scale   SubCutaneous three times a day before meals  insulin lispro (ADMELOG) corrective regimen sliding scale   SubCutaneous at bedtime  predniSONE   Tablet 10 milliGRAM(s) Oral daily    PRN Inpatient Medications  acetaminophen     Tablet .. 650 milliGRAM(s) Oral every 6 hours PRN  aluminum hydroxide/magnesium hydroxide/simethicone Suspension 30 milliLiter(s) Oral every 4 hours PRN  dextrose Oral Gel 15 Gram(s) Oral once PRN  melatonin 3 milliGRAM(s) Oral at bedtime PRN  ondansetron Injectable 4 milliGRAM(s) IV Push every 8 hours PRN      REVIEW OF SYSTEMS  --------------------------------------------------------------------------------  As per HPI    VITALS/PHYSICAL EXAM  --------------------------------------------------------------------------------  T(C): 36.9 (02-21-23 @ 04:38), Max: 37 (02-20-23 @ 17:45)  HR: 92 (02-21-23 @ 04:38) (76 - 93)  BP: 161/79 (02-21-23 @ 04:38) (132/82 - 170/76)  RR: 22 (02-21-23 @ 04:38) (20 - 22)  SpO2: 100% (02-21-23 @ 04:38) (96% - 100%)  Wt(kg): --  Height (cm): 154.9 (02-20-23 @ 08:18)  Weight (kg): 54.4 (02-20-23 @ 08:18)  BMI (kg/m2): 22.7 (02-20-23 @ 08:18)  BSA (m2): 1.52 (02-20-23 @ 08:18)      Physical Exam:  	Gen: ill appearing  	HEENT: MMM  	Pulm: faint crackles in axilla  	CV: S1S2  	Abd: Soft, +BS   	Ext: + LE edema B/L, tender to palpation  	Neuro: Awake and alert   	Skin: Warm and dry      LABS/STUDIES  --------------------------------------------------------------------------------              8.2    12.24 >-----------<  304      [02-21-23 @ 07:58]              24.4     137  |  100  |  100  ----------------------------<  102      [02-21-23 @ 07:58]  4.7   |  17  |  5.31        Ca     8.1     [02-21-23 @ 07:58]      Mg     2.0     [02-21-23 @ 07:58]      Phos  7.3     [02-21-23 @ 07:58]    TPro  6.1  /  Alb  3.3  /  TBili  0.2  /  DBili  x   /  AST  10  /  ALT  14  /  AlkPhos  43  [02-21-23 @ 07:58]    PT/INR: PT 9.8  , INR 0.85       [02-20-23 @ 09:04]  PTT: 25.1       [02-20-23 @ 09:04]      Creatinine Trend:  SCr 5.31 [02-21 @ 07:58]  SCr 5.25 [02-20 @ 22:52]  SCr 5.15 [02-20 @ 09:04]  SCr 4.77 [01-30 @ 07:10]  SCr 4.56 [01-29 @ 07:01]    Urinalysis - [02-20-23 @ 15:05]      Color Colorless / Appearance Clear / SG 1.010 / pH 6.5      Gluc 200 mg/dL / Ketone Negative  / Bili Negative / Urobili Negative       Blood Negative / Protein 300 mg/dL / Leuk Est Negative / Nitrite Negative      RBC 2 / WBC 2 / Hyaline 1 / Gran  / Sq Epi  / Non Sq Epi 2 / Bacteria Negative    Urine Creatinine 18      [02-20-23 @ 22:52]  Urine Protein 229      [02-20-23 @ 22:52]  Urine Sodium 100      [02-20-23 @ 22:52]  Urine Urea Nitrogen 237      [02-20-23 @ 22:52]  Urine Potassium 26      [02-20-23 @ 22:52]  Urine Osmolality 339      [02-20-23 @ 22:52]    Iron 48, TIBC 267, %sat 18      [01-31-23 @ 12:34]  Ferritin 58      [01-31-23 @ 12:34]  PTH -- (Ca 8.1)      [01-31-23 @ 12:34]   98  Vitamin D (25OH) 7.6      [01-27-23 @ 18:12]  HbA1c 11.2      [08-09-19 @ 21:20]  TSH 1.71      [01-28-23 @ 07:29]      JOSLYN: titer Negative, pattern --      [01-28-23 @ 07:29]  dsDNA <12      [01-28-23 @ 07:29]

## 2023-02-21 NOTE — PROGRESS NOTE ADULT - PROBLEM SELECTOR PLAN 2
-pt endorsing worsening LE edema and SOB since being d/c from hospital off of home torsemide   -pt w/ b/l infiltrates on CXR suspicious for pulmonary edema   -PE w/ crackles b/l   -volume overload due to under diuresis i/s/o CKD vs CHF  -pt s/p bumex 4 IV in ED   -nephro c/s; appreciate recs   -start bumex gtt 2cc/hr, goal urine o/p 1-2L/day  -strict Is/Os  -daily weights   -POCUS w/ grossly intact cardiac function   -f/u proBNP  -f/u TTE

## 2023-02-21 NOTE — PROGRESS NOTE ADULT - SUBJECTIVE AND OBJECTIVE BOX
Ambrose Moran MD   Internal Medicine, PGY 1  Contact via TEAMS.     SUBJECTIVE / OVERNIGHT EVENTS:  - Pt seen and examined at bedside  - NADER    MEDICATIONS  (STANDING):  amLODIPine   Tablet 10 milliGRAM(s) Oral daily  atorvastatin 80 milliGRAM(s) Oral at bedtime  buMETAnide Infusion 2 mG/Hr (10 mL/Hr) IV Continuous <Continuous>  carvedilol 6.25 milliGRAM(s) Oral every 12 hours  dextrose 5%. 1000 milliLiter(s) (100 mL/Hr) IV Continuous <Continuous>  dextrose 5%. 1000 milliLiter(s) (50 mL/Hr) IV Continuous <Continuous>  dextrose 50% Injectable 25 Gram(s) IV Push once  dextrose 50% Injectable 12.5 Gram(s) IV Push once  dextrose 50% Injectable 25 Gram(s) IV Push once  famotidine    Tablet 20 milliGRAM(s) Oral daily  fluticasone propionate 50 MICROgram(s)/spray Nasal Spray 1 Spray(s) Both Nostrils two times a day  glucagon  Injectable 1 milliGRAM(s) IntraMuscular once  heparin   Injectable 5000 Unit(s) SubCutaneous every 8 hours  hydrALAZINE 100 milliGRAM(s) Oral three times a day  insulin glargine Injectable (LANTUS) 20 Unit(s) SubCutaneous at bedtime  insulin lispro (ADMELOG) corrective regimen sliding scale   SubCutaneous three times a day before meals  insulin lispro (ADMELOG) corrective regimen sliding scale   SubCutaneous at bedtime  predniSONE   Tablet 10 milliGRAM(s) Oral daily    MEDICATIONS  (PRN):  acetaminophen     Tablet .. 650 milliGRAM(s) Oral every 6 hours PRN Temp greater or equal to 38C (100.4F), Mild Pain (1 - 3)  aluminum hydroxide/magnesium hydroxide/simethicone Suspension 30 milliLiter(s) Oral every 4 hours PRN Dyspepsia  dextrose Oral Gel 15 Gram(s) Oral once PRN Blood Glucose LESS THAN 70 milliGRAM(s)/deciliter  melatonin 3 milliGRAM(s) Oral at bedtime PRN Insomnia  ondansetron Injectable 4 milliGRAM(s) IV Push every 8 hours PRN Nausea and/or Vomiting          PHYSICAL EXAM:  Vital Signs Last 24 Hrs  T(C): 36.9 (2023 04:38), Max: 37 (2023 17:45)  T(F): 98.4 (2023 04:38), Max: 98.6 (2023 17:45)  HR: 92 (2023 04:38) (76 - 93)  BP: 161/79 (2023 04:38) (126/79 - 170/76)  BP(mean): --  RR: 22 (2023 04:38) (20 - 28)  SpO2: 100% (2023 04:38) (94% - 100%)    Parameters below as of 2023 04:38  Patient On (Oxygen Delivery Method): nasal cannula  O2 Flow (L/min): 2      CAPILLARY BLOOD GLUCOSE      POCT Blood Glucose.: 264 mg/dL (2023 00:43)  POCT Blood Glucose.: 282 mg/dL (2023 23:16)  POCT Blood Glucose.: 364 mg/dL (2023 22:01)  POCT Blood Glucose.: 228 mg/dL (2023 15:21)    I&O's Summary      CONSTITUTIONAL: NAD, well-developed  RESPIRATORY: Normal respiratory effort; lungs are clear to auscultation bilaterally  CARDIOVASCULAR: Regular rate and rhythm, normal S1 and S2, no murmur/rub/gallop; No lower extremity edema; Peripheral pulses are 2+ bilaterally  ABDOMEN: Nontender to palpation, normoactive bowel sounds, no rebound/guarding; No hepatosplenomegaly  MUSCLOSKELETAL: no clubbing or cyanosis of digits; no joint swelling or tenderness to palpation  PSYCH: A+O to person, place, and time; affect appropriate    LABS:                        8.0    15.41 )-----------( 290      ( 2023 09:04 )             24.2     02-20    133<L>  |  99  |  96<H>  ----------------------------<  306<H>  5.8<H>   |  15<L>  |  5.25<H>    Ca    7.9<L>      2023 22:52    TPro  6.2  /  Alb  3.5  /  TBili  0.2  /  DBili  x   /  AST  13  /  ALT  16  /  AlkPhos  43  02-20    PT/INR - ( 2023 09:04 )   PT: 9.8 sec;   INR: 0.85 ratio         PTT - ( 2023 09:04 )  PTT:25.1 sec      Urinalysis Basic - ( 2023 15:05 )    Color: Colorless / Appearance: Clear / S.010 / pH: x  Gluc: x / Ketone: Negative  / Bili: Negative / Urobili: Negative   Blood: x / Protein: 300 mg/dL / Nitrite: Negative   Leuk Esterase: Negative / RBC: 2 /hpf / WBC 2 /HPF   Sq Epi: x / Non Sq Epi: 2 /hpf / Bacteria: Negative          IMAGING:    [X] All pertinent imaging reviewed by me Ambrose Moran MD   Internal Medicine, PGY 1  Contact via TEAMS.     SUBJECTIVE / OVERNIGHT EVENTS:  - Pt seen and examined at bedside  - Pt hyperkalemic ON, given insulin+D50 and lokelma. Repeat CMP this AM w/ resolved hyperkalemia.    MEDICATIONS  (STANDING):  amLODIPine   Tablet 10 milliGRAM(s) Oral daily  atorvastatin 80 milliGRAM(s) Oral at bedtime  buMETAnide Infusion 2 mG/Hr (10 mL/Hr) IV Continuous <Continuous>  carvedilol 6.25 milliGRAM(s) Oral every 12 hours  dextrose 5%. 1000 milliLiter(s) (100 mL/Hr) IV Continuous <Continuous>  dextrose 5%. 1000 milliLiter(s) (50 mL/Hr) IV Continuous <Continuous>  dextrose 50% Injectable 25 Gram(s) IV Push once  dextrose 50% Injectable 12.5 Gram(s) IV Push once  dextrose 50% Injectable 25 Gram(s) IV Push once  famotidine    Tablet 20 milliGRAM(s) Oral daily  fluticasone propionate 50 MICROgram(s)/spray Nasal Spray 1 Spray(s) Both Nostrils two times a day  glucagon  Injectable 1 milliGRAM(s) IntraMuscular once  heparin   Injectable 5000 Unit(s) SubCutaneous every 8 hours  hydrALAZINE 100 milliGRAM(s) Oral three times a day  insulin glargine Injectable (LANTUS) 20 Unit(s) SubCutaneous at bedtime  insulin lispro (ADMELOG) corrective regimen sliding scale   SubCutaneous three times a day before meals  insulin lispro (ADMELOG) corrective regimen sliding scale   SubCutaneous at bedtime  predniSONE   Tablet 10 milliGRAM(s) Oral daily    MEDICATIONS  (PRN):  acetaminophen     Tablet .. 650 milliGRAM(s) Oral every 6 hours PRN Temp greater or equal to 38C (100.4F), Mild Pain (1 - 3)  aluminum hydroxide/magnesium hydroxide/simethicone Suspension 30 milliLiter(s) Oral every 4 hours PRN Dyspepsia  dextrose Oral Gel 15 Gram(s) Oral once PRN Blood Glucose LESS THAN 70 milliGRAM(s)/deciliter  melatonin 3 milliGRAM(s) Oral at bedtime PRN Insomnia  ondansetron Injectable 4 milliGRAM(s) IV Push every 8 hours PRN Nausea and/or Vomiting          PHYSICAL EXAM:  Vital Signs Last 24 Hrs  T(C): 36.9 (2023 04:38), Max: 37 (2023 17:45)  T(F): 98.4 (2023 04:38), Max: 98.6 (2023 17:45)  HR: 92 (2023 04:38) (76 - 93)  BP: 161/79 (2023 04:38) (126/79 - 170/76)  BP(mean): --  RR: 22 (2023 04:38) (20 - 28)  SpO2: 100% (2023 04:38) (94% - 100%)    Parameters below as of 2023 04:38  Patient On (Oxygen Delivery Method): nasal cannula  O2 Flow (L/min): 2      CAPILLARY BLOOD GLUCOSE      POCT Blood Glucose.: 264 mg/dL (2023 00:43)  POCT Blood Glucose.: 282 mg/dL (2023 23:16)  POCT Blood Glucose.: 364 mg/dL (2023 22:01)  POCT Blood Glucose.: 228 mg/dL (2023 15:21)    I&O's Summary      CONSTITUTIONAL: NAD, well-developed  HEENT: NC/AT  RESPIRATORY: b/l crackles   CARDIOVASCULAR: Regular rate and rhythm, normal S1 and S2, no murmur/rub/gallop; No lower extremity edema; Peripheral pulses are 2+ bilaterally  ABDOMEN: Nontender to palpation, normoactive bowel sounds, no rebound/guarding; No hepatosplenomegaly  MUSCLOSKELETAL: no clubbing or cyanosis of digits; no joint swelling or tenderness to palpation  EXTREMITIES: 2+ b/l LE edema   NEURO: no focal deficits   PSYCH: A+O to person, place, and time; affect appropriate    LABS:                        8.0    15.41 )-----------( 290      ( 2023 09:04 )             24.2     02-20    133<L>  |  99  |  96<H>  ----------------------------<  306<H>  5.8<H>   |  15<L>  |  5.25<H>    Ca    7.9<L>      2023 22:52    TPro  6.2  /  Alb  3.5  /  TBili  0.2  /  DBili  x   /  AST  13  /  ALT  16  /  AlkPhos  43  02-20    PT/INR - ( 2023 09:04 )   PT: 9.8 sec;   INR: 0.85 ratio         PTT - ( 2023 09:04 )  PTT:25.1 sec      Urinalysis Basic - ( 2023 15:05 )    Color: Colorless / Appearance: Clear / S.010 / pH: x  Gluc: x / Ketone: Negative  / Bili: Negative / Urobili: Negative   Blood: x / Protein: 300 mg/dL / Nitrite: Negative   Leuk Esterase: Negative / RBC: 2 /hpf / WBC 2 /HPF   Sq Epi: x / Non Sq Epi: 2 /hpf / Bacteria: Negative          IMAGING:    [X] All pertinent imaging reviewed by me

## 2023-02-21 NOTE — PATIENT PROFILE ADULT - FUNCTIONAL ASSESSMENT - BASIC MOBILITY 3.
RITA RAMOS 89y Female from home brought by family to the ER for c/o generalized weakness, SOB and inc difficulty ambulating x 3 days du to generalized muscke pain.  The pt at baseline isable to ambulate with walker but in the last few days has been unable to get out of be due to severe musculoskeletal pain.  Of note the pt has Hx of  warm autoimmune hemlytic anemia and has been ofn prolonged steroid  tx  and has had  Rutixan tx as well.  The pt was noted to be fluid overloaded with a BNP of 4500. There is also the question of PNA  and PE.   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 There is also the ques of PNA and PE given the lower ext DVT.  The pt is being evaluated by Hem-Onc Dr Mejias.  The PMHx includes:  HTN, ASHD, TIA, CKD III, Warm IGG Hemolytic Anemia, OA, DDD, DJD, mobility dysfunction, GERD, diverticulosis, ch constipation.  Hospital Course:  The pt was treated for hypoxic RF with high flow O2,V diuretics and was already on steroids for hematologic issue.  The  pt had + BL DVT and was started on IV heparin, noted to have dec in PLTS ( she previously had dec in PLts which improved with D/C pepcid), concern over HIT, thus D/C all heparin products and started on  Eliquis.  The  pt had a high probability of  PE given the SOB, quick desaturation off O2 and + DVT. The pt had an  ECHO which  showed EF 55-60%, GIDD, severe AS/mild AI, severe Pul HTN.  The pt cont to require high flow O2 and given the prolonged immunosuppressive tx with prednisone the possibility of an opportunistic infection ( PCP) is high risk.  The Fungitell was + and the pt was started on Mepron/Atovaquone.      Overnight events:  pt clinically improved, c/o oral/tongue pain +thrush,  OOB to chair, good appetite, off O2, prednisone 40mg, apixiban now 5mg bid, atovaquon  750 q12, goal is SNF placement for cont tx and mm strength and mobility improvement    MEDICATIONS  (STANDING):  Mepron/atovaquone 750mg q12  chlorhexidine 4% Liquid 1 Application(s) Topical <User Schedule>  cyanocobalamin 1000 MICROGram(s) Oral daily  folic acid 1 milliGRAM(s) Oral daily  furosemide   Injectable 20 milliGRAM(s) to po  NIFEdipine XL 30 milliGRAM(s) Oral daily  predniSONE   Tablet 40 milliGRAM(s) Oral daily  Eliquis 5mg q 12   MEDICATIONS  (PRN):      Allergies    aspirin (Unknown)  Cipro (Unknown)  codeine (Unknown)  dicyclomine (Unknown)  Diovan (Unknown)  Flagyl (Unknown)  Librax (Unknown)  metronidazole (Unknown)  penicillin (Unknown)  Prevacid (Unknown)  trimethobenzamide (Unknown)  	    Vital Signs Last 24 Hrs    T(F): 97.3  HR: 70  BP: 105/55    RR: 18  SpO2: 100%    PHYSICAL EXAM:    Constitutional:  elderly, pale,  frail and fragile, alert and oriented and verbal      Eyes:  nonicteric    ENMT:  dental defects, white patches    Neck:  supple, + JVD, no bruits    Back:  + Kyphosis    Respiratory:  dec BS, shallow respirations, bibasilar crackles    Cardiovascular:  S1S2 tachy    Gastrointestinal: globose soft and benign    Genitourinary:    Extremities: moves all ext, + arthritic changes, + edema        LABS:                       10.7  7 )-----------(159            33.4    137 |  94 |  36  ----------------------------<  119  3.8  |  35 |  0.7  GFR  28...49, 64  Ca    8.4       Phos  3.4      Mg     2.3  troponin 0.02  BNP 7,429  4,500 dec to 840  , 404  Haptos 85    LA 4.0  Nare MRSA negativ  Fungitell  >500   Mycoplasma negative    TPro  4.6, 4.3 /  Alb  3.1, 2.8, 2.6  /  TBili  1.0  /  DBili  0.4<H>  /  AST  14  /  ALT  13  /  AlkPhos  73  11-26    PT/INR - ( 2019 13:30 )   PT: 13.10 sec;   INR: 1.14 ratio         PTT - ( 2019 13:30 )  PTT:21.6 sec  Urinalysis Basic - ( 2019 16:20 )    Color: Yellow / Appearance: Clear / S.020 / pH: x  Gluc: x / Ketone: Negative  / Bili: Negative / Urobili: <2 mg/dL   Blood: x / Protein: 30 mg/dL / Nitrite: Negative   Leuk Esterase: Negative / RBC: 10 /HPF / WBC 6 /HPF   Sq Epi: x / Non Sq Epi: 5 /HPF / Bacteria: Few        RADIOLOGY & ADDITIONAL TESTS:  CXR  inc vasc markings, cardiomegaly  CXR :  stable BL space occupying  opacities and sm L pl effusion  EKG sinus tach 121/min QTc 462    CT of abd:  bibasilar atelectasis, stable hepatic cyst,  calcification n the sybil hepatis in the area of the GB neck, stable  side branch pancreatic IPMN, no pancreatic constanza dilatation    Sono of abd:  no cholelithiases    Venous doppler of lower ext:  + DVT of  R posterior tibial vein, + DVT of  L peroneal and soleal veins    ECHO:  LVEF 55-60%, , mild LV wall thickening, GIDD, moderate enlarged R ventricle,  trace MR, severe AS, mild AI, inc pul a pressure 89.9 c/w severe Pul HTN RITA RAMOS 89y Female from home brought by family to the ER for c/o generalized weakness, SOB and inc difficulty ambulating x 3 days du to generalized muscke pain.  The pt at baseline isable to ambulate with walker but in the last few days has been unable to get out of be due to severe musculoskeletal pain.  Of note the pt has Hx of  warm autoimmune hemlytic anemia and has been ofn prolonged steroid  tx  and has had  Rutixan tx as well.  The pt was noted to be fluid overloaded with a BNP of 4500. There is also the question of PNA  and PE.   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 There is also the ques of PNA and PE given the lower ext DVT.  The pt is being evaluated by Hem-Onc Dr Mejias.  The PMHx includes:  HTN, ASHD, TIA, CKD III, Warm IGG Hemolytic Anemia, OA, DDD, DJD, mobility dysfunction, GERD, diverticulosis, ch constipation.  Hospital Course:  The pt was treated for hypoxic RF with high flow O2,V diuretics and was already on steroids for hematologic issue.  The  pt had + BL DVT and was started on IV heparin, noted to have dec in PLTS ( she previously had dec in PLts which improved with D/C pepcid), concern over HIT, thus D/C all heparin products and started on  Eliquis.  The  pt had a high probability of  PE given the SOB, quick desaturation off O2 and + DVT. The pt had an  ECHO which  showed EF 55-60%, GIDD, severe AS/mild AI, severe Pul HTN.  The pt cont to require high flow O2 and given the prolonged immunosuppressive tx with prednisone the possibility of an opportunistic infection ( PCP) is high risk.  The Fungitell was + and the pt was started on Mepron/Atovaquone.      Overnight events:  pt examined while sitting OOB in chair, daughter present, pt feels well, v alert and appropriate, oral discomfort improved on Myeclex nate, switched from atovaquone to Bactrim SS for presumed PCP, cont on Eliquis, pt needs O2  2 L desaturates on RA to 85, will anticipate for D/C to SNF for the AM    MEDICATIONS  (STANDING):  Mepron/atovaquone 750mg q12  chlorhexidine 4% Liquid 1 Application(s) Topical <User Schedule>  cyanocobalamin 1000 MICROGram(s) Oral daily  folic acid 1 milliGRAM(s) Oral daily  furosemide   Injectable 20 milliGRAM(s) to po  NIFEdipine XL 30 milliGRAM(s) Oral daily  predniSONE   Tablet 40 milliGRAM(s) Oral daily  Eliquis 5mg q 12   MEDICATIONS  (PRN):      Allergies    aspirin (Unknown)  Cipro (Unknown)  codeine (Unknown)  dicyclomine (Unknown)  Diovan (Unknown)  Flagyl (Unknown)  Librax (Unknown)  metronidazole (Unknown)  penicillin (Unknown)  Prevacid (Unknown)  trimethobenzamide (Unknown)  	    Vital Signs Last 24 Hrs    T(F): 96.1  HR: 75  BP: 115/58    RR: 18  SpO2: 95% on 2 L, 85% on RA    PHYSICAL EXAM:    Constitutional:  elderly, pale,  frail,  alert and oriented and verbal , O2 2 L NC    Eyes:  nonicteric    ENMT:  dental defects, white patches    Neck:  supple, + JVD, no bruits    Back:  + Kyphosis    Respiratory:  dec BS, shallow respirations, bibasilar crackles    Cardiovascular:  S1S2 tachy    Gastrointestinal: globose soft and benign    Genitourinary:    Extremities: moves all ext, + arthritic changes, + edema        LABS:                       10.3  7 )-----------(137            32.4    141  98 |  20  ----------------------------<  106  3.3  |  28 |  0.8    GFR  28...49, 64  Ca    8.4       Phos  3.4      Mg     2.3  troponin 0.02  BNP 7,429  4,500 dec to 840  , 404  Haptos 85    LA 4.0  Nare MRSA negativ  Fungitell  >500   Mycoplasma negative    TPro  4.6, 4.3, 4.4 /  Alb  3.1, 2.8, 2.6, 2.7  /  TBili  1.0  /  DBili  0.4<H>  /  AST  14  /  ALT  13  /  AlkPhos  73  11-26    PT/INR - ( 2019 13:30 )   PT: 13.10 sec;   INR: 1.14 ratio         PTT - ( 2019 13:30 )  PTT:21.6 sec  Urinalysis Basic - ( 2019 16:20 )    Color: Yellow / Appearance: Clear / S.020 / pH: x  Gluc: x / Ketone: Negative  / Bili: Negative / Urobili: <2 mg/dL   Blood: x / Protein: 30 mg/dL / Nitrite: Negative   Leuk Esterase: Negative / RBC: 10 /HPF / WBC 6 /HPF   Sq Epi: x / Non Sq Epi: 5 /HPF / Bacteria: Few        RADIOLOGY & ADDITIONAL TESTS:  CXR  inc vasc markings, cardiomegaly  CXR :  stable BL space occupying  opacities and sm L pl effusion  EKG sinus tach 121/min QTc 462    CT of abd:  bibasilar atelectasis, stable hepatic cyst,  calcification n the sybil hepatis in the area of the GB neck, stable  side branch pancreatic IPMN, no pancreatic constanza dilatation    Sono of abd:  no cholelithiases    Venous doppler of lower ext:  + DVT of  R posterior tibial vein, + DVT of  L peroneal and soleal veins    ECHO:  LVEF 55-60%, , mild LV wall thickening, GIDD, moderate enlarged R ventricle,  trace MR, severe AS, mild AI, inc pul a pressure 89.9 c/w severe Pul HTN 4 = No assist / stand by assistance

## 2023-02-22 ENCOUNTER — APPOINTMENT (OUTPATIENT)
Dept: NEPHROLOGY | Facility: CLINIC | Age: 53
End: 2023-02-22

## 2023-02-22 LAB
ALBUMIN SERPL ELPH-MCNC: 3.3 G/DL — SIGNIFICANT CHANGE UP (ref 3.3–5)
ALP SERPL-CCNC: 39 U/L — LOW (ref 40–120)
ALT FLD-CCNC: 16 U/L — SIGNIFICANT CHANGE UP (ref 10–45)
ANION GAP SERPL CALC-SCNC: 18 MMOL/L — HIGH (ref 5–17)
ANION GAP SERPL CALC-SCNC: 19 MMOL/L — HIGH (ref 5–17)
ANION GAP SERPL CALC-SCNC: 20 MMOL/L — HIGH (ref 5–17)
AST SERPL-CCNC: 13 U/L — SIGNIFICANT CHANGE UP (ref 10–40)
BASE EXCESS BLDV CALC-SCNC: -2.8 MMOL/L — LOW (ref -2–3)
BILIRUB SERPL-MCNC: 0.2 MG/DL — SIGNIFICANT CHANGE UP (ref 0.2–1.2)
BLD GP AB SCN SERPL QL: NEGATIVE — SIGNIFICANT CHANGE UP
BUN SERPL-MCNC: 104 MG/DL — HIGH (ref 7–23)
BUN SERPL-MCNC: 105 MG/DL — HIGH (ref 7–23)
BUN SERPL-MCNC: 113 MG/DL — HIGH (ref 7–23)
CA-I SERPL-SCNC: 1.05 MMOL/L — LOW (ref 1.15–1.33)
CALCIUM SERPL-MCNC: 8 MG/DL — LOW (ref 8.4–10.5)
CALCIUM SERPL-MCNC: 8.1 MG/DL — LOW (ref 8.4–10.5)
CALCIUM SERPL-MCNC: 8.4 MG/DL — SIGNIFICANT CHANGE UP (ref 8.4–10.5)
CALCIUM SERPL-MCNC: 8.4 MG/DL — SIGNIFICANT CHANGE UP (ref 8.4–10.5)
CHLORIDE BLDV-SCNC: 103 MMOL/L — SIGNIFICANT CHANGE UP (ref 96–108)
CHLORIDE SERPL-SCNC: 100 MMOL/L — SIGNIFICANT CHANGE UP (ref 96–108)
CHLORIDE SERPL-SCNC: 101 MMOL/L — SIGNIFICANT CHANGE UP (ref 96–108)
CHLORIDE SERPL-SCNC: 99 MMOL/L — SIGNIFICANT CHANGE UP (ref 96–108)
CO2 BLDV-SCNC: 23 MMOL/L — SIGNIFICANT CHANGE UP (ref 22–26)
CO2 SERPL-SCNC: 18 MMOL/L — LOW (ref 22–31)
CO2 SERPL-SCNC: 19 MMOL/L — LOW (ref 22–31)
CO2 SERPL-SCNC: 19 MMOL/L — LOW (ref 22–31)
CREAT ?TM UR-MCNC: 17 MG/DL — SIGNIFICANT CHANGE UP
CREAT SERPL-MCNC: 5.66 MG/DL — HIGH (ref 0.5–1.3)
CREAT SERPL-MCNC: 5.88 MG/DL — HIGH (ref 0.5–1.3)
CREAT SERPL-MCNC: 5.94 MG/DL — HIGH (ref 0.5–1.3)
EGFR: 8 ML/MIN/1.73M2 — LOW
EGFR: 8 ML/MIN/1.73M2 — LOW
EGFR: 9 ML/MIN/1.73M2 — LOW
GAS PNL BLDV: 133 MMOL/L — LOW (ref 136–145)
GAS PNL BLDV: SIGNIFICANT CHANGE UP
GAS PNL BLDV: SIGNIFICANT CHANGE UP
GLUCOSE BLDC GLUCOMTR-MCNC: 143 MG/DL — HIGH (ref 70–99)
GLUCOSE BLDC GLUCOMTR-MCNC: 178 MG/DL — HIGH (ref 70–99)
GLUCOSE BLDC GLUCOMTR-MCNC: 229 MG/DL — HIGH (ref 70–99)
GLUCOSE BLDC GLUCOMTR-MCNC: 319 MG/DL — HIGH (ref 70–99)
GLUCOSE BLDV-MCNC: 215 MG/DL — HIGH (ref 70–99)
GLUCOSE SERPL-MCNC: 210 MG/DL — HIGH (ref 70–99)
GLUCOSE SERPL-MCNC: 252 MG/DL — HIGH (ref 70–99)
GLUCOSE SERPL-MCNC: 81 MG/DL — SIGNIFICANT CHANGE UP (ref 70–99)
HCO3 BLDV-SCNC: 22 MMOL/L — SIGNIFICANT CHANGE UP (ref 22–29)
HCT VFR BLD CALC: 23.8 % — LOW (ref 34.5–45)
HCT VFR BLDA CALC: 24 % — LOW (ref 34.5–46.5)
HGB BLD CALC-MCNC: 8 G/DL — LOW (ref 11.7–16.1)
HGB BLD-MCNC: 7.7 G/DL — LOW (ref 11.5–15.5)
LACTATE BLDV-MCNC: 0.8 MMOL/L — SIGNIFICANT CHANGE UP (ref 0.5–2)
MAGNESIUM SERPL-MCNC: 2.1 MG/DL — SIGNIFICANT CHANGE UP (ref 1.6–2.6)
MAGNESIUM SERPL-MCNC: 2.2 MG/DL — SIGNIFICANT CHANGE UP (ref 1.6–2.6)
MAGNESIUM SERPL-MCNC: 2.2 MG/DL — SIGNIFICANT CHANGE UP (ref 1.6–2.6)
MCHC RBC-ENTMCNC: 27.7 PG — SIGNIFICANT CHANGE UP (ref 27–34)
MCHC RBC-ENTMCNC: 32.4 GM/DL — SIGNIFICANT CHANGE UP (ref 32–36)
MCV RBC AUTO: 85.6 FL — SIGNIFICANT CHANGE UP (ref 80–100)
NRBC # BLD: 0 /100 WBCS — SIGNIFICANT CHANGE UP (ref 0–0)
PCO2 BLDV: 35 MMHG — LOW (ref 39–42)
PH BLDV: 7.4 — SIGNIFICANT CHANGE UP (ref 7.32–7.43)
PHOSPHATE SERPL-MCNC: 8.4 MG/DL — HIGH (ref 2.5–4.5)
PHOSPHATE SERPL-MCNC: 8.6 MG/DL — HIGH (ref 2.5–4.5)
PHOSPHATE SERPL-MCNC: 9.2 MG/DL — HIGH (ref 2.5–4.5)
PLATELET # BLD AUTO: 287 K/UL — SIGNIFICANT CHANGE UP (ref 150–400)
PO2 BLDV: 110 MMHG — HIGH (ref 25–45)
POTASSIUM BLDV-SCNC: 5.6 MMOL/L — HIGH (ref 3.5–5.1)
POTASSIUM SERPL-MCNC: 4.8 MMOL/L — SIGNIFICANT CHANGE UP (ref 3.5–5.3)
POTASSIUM SERPL-MCNC: 5.2 MMOL/L — SIGNIFICANT CHANGE UP (ref 3.5–5.3)
POTASSIUM SERPL-MCNC: 5.5 MMOL/L — HIGH (ref 3.5–5.3)
POTASSIUM SERPL-SCNC: 4.8 MMOL/L — SIGNIFICANT CHANGE UP (ref 3.5–5.3)
POTASSIUM SERPL-SCNC: 5.2 MMOL/L — SIGNIFICANT CHANGE UP (ref 3.5–5.3)
POTASSIUM SERPL-SCNC: 5.5 MMOL/L — HIGH (ref 3.5–5.3)
PROT ?TM UR-MCNC: 206 MG/DL — HIGH (ref 0–12)
PROT SERPL-MCNC: 6.1 G/DL — SIGNIFICANT CHANGE UP (ref 6–8.3)
PROT/CREAT UR-RTO: 12.1 RATIO — HIGH (ref 0–0.2)
PTH-INTACT FLD-MCNC: 233 PG/ML — HIGH (ref 15–65)
RBC # BLD: 2.78 M/UL — LOW (ref 3.8–5.2)
RBC # FLD: 13.2 % — SIGNIFICANT CHANGE UP (ref 10.3–14.5)
RH IG SCN BLD-IMP: NEGATIVE — SIGNIFICANT CHANGE UP
SAO2 % BLDV: 98.2 % — HIGH (ref 67–88)
SODIUM SERPL-SCNC: 136 MMOL/L — SIGNIFICANT CHANGE UP (ref 135–145)
SODIUM SERPL-SCNC: 137 MMOL/L — SIGNIFICANT CHANGE UP (ref 135–145)
SODIUM SERPL-SCNC: 140 MMOL/L — SIGNIFICANT CHANGE UP (ref 135–145)
WBC # BLD: 8.76 K/UL — SIGNIFICANT CHANGE UP (ref 3.8–10.5)
WBC # FLD AUTO: 8.76 K/UL — SIGNIFICANT CHANGE UP (ref 3.8–10.5)

## 2023-02-22 PROCEDURE — 99233 SBSQ HOSP IP/OBS HIGH 50: CPT

## 2023-02-22 PROCEDURE — 99233 SBSQ HOSP IP/OBS HIGH 50: CPT | Mod: GC

## 2023-02-22 RX ORDER — SEVELAMER CARBONATE 2400 MG/1
800 POWDER, FOR SUSPENSION ORAL
Refills: 0 | Status: DISCONTINUED | OUTPATIENT
Start: 2023-02-22 | End: 2023-02-24

## 2023-02-22 RX ORDER — ERYTHROPOIETIN 10000 [IU]/ML
6000 INJECTION, SOLUTION INTRAVENOUS; SUBCUTANEOUS ONCE
Refills: 0 | Status: DISCONTINUED | OUTPATIENT
Start: 2023-02-22 | End: 2023-02-25

## 2023-02-22 RX ORDER — SODIUM BICARBONATE 1 MEQ/ML
650 SYRINGE (ML) INTRAVENOUS THREE TIMES A DAY
Refills: 0 | Status: DISCONTINUED | OUTPATIENT
Start: 2023-02-22 | End: 2023-02-25

## 2023-02-22 RX ORDER — DEXTROSE 50 % IN WATER 50 %
50 SYRINGE (ML) INTRAVENOUS ONCE
Refills: 0 | Status: DISCONTINUED | OUTPATIENT
Start: 2023-02-22 | End: 2023-02-22

## 2023-02-22 RX ORDER — INSULIN HUMAN 100 [IU]/ML
5 INJECTION, SOLUTION SUBCUTANEOUS ONCE
Refills: 0 | Status: DISCONTINUED | OUTPATIENT
Start: 2023-02-22 | End: 2023-02-22

## 2023-02-22 RX ADMIN — Medication 650 MILLIGRAM(S): at 23:31

## 2023-02-22 RX ADMIN — BUMETANIDE 10 MG/HR: 0.25 INJECTION INTRAMUSCULAR; INTRAVENOUS at 07:57

## 2023-02-22 RX ADMIN — FAMOTIDINE 20 MILLIGRAM(S): 10 INJECTION INTRAVENOUS at 11:53

## 2023-02-22 RX ADMIN — SEVELAMER CARBONATE 800 MILLIGRAM(S): 2400 POWDER, FOR SUSPENSION ORAL at 17:47

## 2023-02-22 RX ADMIN — CARVEDILOL PHOSPHATE 6.25 MILLIGRAM(S): 80 CAPSULE, EXTENDED RELEASE ORAL at 05:27

## 2023-02-22 RX ADMIN — Medication 100 MILLIGRAM(S): at 22:30

## 2023-02-22 RX ADMIN — Medication 2: at 22:03

## 2023-02-22 RX ADMIN — Medication 1 SPRAY(S): at 05:29

## 2023-02-22 RX ADMIN — AMLODIPINE BESYLATE 10 MILLIGRAM(S): 2.5 TABLET ORAL at 05:27

## 2023-02-22 RX ADMIN — Medication 100 MILLIGRAM(S): at 05:28

## 2023-02-22 RX ADMIN — Medication 650 MILLIGRAM(S): at 12:23

## 2023-02-22 RX ADMIN — Medication 100 MILLIGRAM(S): at 13:11

## 2023-02-22 RX ADMIN — INSULIN GLARGINE 25 UNIT(S): 100 INJECTION, SOLUTION SUBCUTANEOUS at 22:04

## 2023-02-22 RX ADMIN — HEPARIN SODIUM 5000 UNIT(S): 5000 INJECTION INTRAVENOUS; SUBCUTANEOUS at 22:30

## 2023-02-22 RX ADMIN — Medication 5 UNIT(S): at 09:14

## 2023-02-22 RX ADMIN — HEPARIN SODIUM 5000 UNIT(S): 5000 INJECTION INTRAVENOUS; SUBCUTANEOUS at 13:12

## 2023-02-22 RX ADMIN — Medication 650 MILLIGRAM(S): at 22:31

## 2023-02-22 RX ADMIN — CARVEDILOL PHOSPHATE 6.25 MILLIGRAM(S): 80 CAPSULE, EXTENDED RELEASE ORAL at 17:44

## 2023-02-22 RX ADMIN — Medication 2: at 17:43

## 2023-02-22 RX ADMIN — Medication 5 UNIT(S): at 17:43

## 2023-02-22 RX ADMIN — Medication 10 MILLIGRAM(S): at 05:27

## 2023-02-22 RX ADMIN — ATORVASTATIN CALCIUM 80 MILLIGRAM(S): 80 TABLET, FILM COATED ORAL at 22:30

## 2023-02-22 RX ADMIN — Medication 1 SPRAY(S): at 17:44

## 2023-02-22 RX ADMIN — Medication 650 MILLIGRAM(S): at 11:53

## 2023-02-22 RX ADMIN — HEPARIN SODIUM 5000 UNIT(S): 5000 INJECTION INTRAVENOUS; SUBCUTANEOUS at 05:28

## 2023-02-22 RX ADMIN — Medication 650 MILLIGRAM(S): at 17:47

## 2023-02-22 RX ADMIN — BUMETANIDE 10 MG/HR: 0.25 INJECTION INTRAMUSCULAR; INTRAVENOUS at 17:42

## 2023-02-22 RX ADMIN — SEVELAMER CARBONATE 800 MILLIGRAM(S): 2400 POWDER, FOR SUSPENSION ORAL at 11:53

## 2023-02-22 RX ADMIN — Medication 5 UNIT(S): at 13:12

## 2023-02-22 RX ADMIN — Medication 1: at 13:12

## 2023-02-22 NOTE — PROGRESS NOTE ADULT - PROBLEM SELECTOR PLAN 3
-pt w/ sCr 5.15 on presentation   -pt w/ baseline CKD stage 5 i/s/o uncontrolled DM  -during last hospitalization baseline sCr around 4.9  -worsening renal function possibly i/s/o volume overload   -f/u U/A, FeUrea, urine lytes, spot TP/CR  -will get AVF inpt per nephrology recommendations -pt w/ sCr 5.15 on presentation   -pt w/ baseline CKD stage 5 i/s/o uncontrolled DM  -during last hospitalization baseline sCr around 4.9  -worsening renal function possibly i/s/o volume overload   -U/A, FeUrea, urine lytes, spot TP/CR suggestive of intrinsic renal disease   -c/w sodium bicarb 650 TID   -c/w sevelamer 800 TID w/ meals   -possibility of HD requirement depending on progression of kidney function discussed w/ pt

## 2023-02-22 NOTE — PROGRESS NOTE ADULT - SUBJECTIVE AND OBJECTIVE BOX
Ambrose Moran MD   Internal Medicine, PGY 1  Contact via TEAMS.     SUBJECTIVE / OVERNIGHT EVENTS:  - Pt seen and examined at bedside  - NADER    MEDICATIONS  (STANDING):  amLODIPine   Tablet 10 milliGRAM(s) Oral daily  atorvastatin 80 milliGRAM(s) Oral at bedtime  buMETAnide Infusion 2 mG/Hr (10 mL/Hr) IV Continuous <Continuous>  carvedilol 6.25 milliGRAM(s) Oral every 12 hours  dextrose 5%. 1000 milliLiter(s) (50 mL/Hr) IV Continuous <Continuous>  dextrose 5%. 1000 milliLiter(s) (100 mL/Hr) IV Continuous <Continuous>  dextrose 50% Injectable 25 Gram(s) IV Push once  dextrose 50% Injectable 12.5 Gram(s) IV Push once  dextrose 50% Injectable 25 Gram(s) IV Push once  famotidine    Tablet 20 milliGRAM(s) Oral daily  fluticasone propionate 50 MICROgram(s)/spray Nasal Spray 1 Spray(s) Both Nostrils two times a day  glucagon  Injectable 1 milliGRAM(s) IntraMuscular once  heparin   Injectable 5000 Unit(s) SubCutaneous every 8 hours  hydrALAZINE 100 milliGRAM(s) Oral three times a day  influenza   Vaccine 0.5 milliLiter(s) IntraMuscular once  insulin glargine Injectable (LANTUS) 25 Unit(s) SubCutaneous at bedtime  insulin lispro (ADMELOG) corrective regimen sliding scale   SubCutaneous three times a day before meals  insulin lispro (ADMELOG) corrective regimen sliding scale   SubCutaneous at bedtime  insulin lispro Injectable (ADMELOG) 5 Unit(s) SubCutaneous three times a day before meals  predniSONE   Tablet 10 milliGRAM(s) Oral daily    MEDICATIONS  (PRN):  acetaminophen     Tablet .. 650 milliGRAM(s) Oral every 6 hours PRN Temp greater or equal to 38C (100.4F), Mild Pain (1 - 3)  aluminum hydroxide/magnesium hydroxide/simethicone Suspension 30 milliLiter(s) Oral every 4 hours PRN Dyspepsia  dextrose Oral Gel 15 Gram(s) Oral once PRN Blood Glucose LESS THAN 70 milliGRAM(s)/deciliter  melatonin 3 milliGRAM(s) Oral at bedtime PRN Insomnia  ondansetron Injectable 4 milliGRAM(s) IV Push every 8 hours PRN Nausea and/or Vomiting        23 @ 07:01  -  23 @ 07:00  --------------------------------------------------------  IN: 280 mL / OUT: 3050 mL / NET: -2770 mL        PHYSICAL EXAM:  Vital Signs Last 24 Hrs  T(C): 37.2 (2023 04:47), Max: 37.2 (2023 04:47)  T(F): 99 (2023 04:47), Max: 99 (2023 04:47)  HR: 100 (2023 04:47) (88 - 100)  BP: 147/75 (2023 04:47) (140/64 - 153/74)  BP(mean): --  RR: 18 (:47) (18 - 19)  SpO2: 96% (:47) (96% - 99%)    Parameters below as of 2023 04:47  Patient On (Oxygen Delivery Method): room air        CAPILLARY BLOOD GLUCOSE      POCT Blood Glucose.: 240 mg/dL (2023 22:10)  POCT Blood Glucose.: 349 mg/dL (2023 17:03)  POCT Blood Glucose.: 289 mg/dL (2023 11:31)  POCT Blood Glucose.: 70 mg/dL (2023 08:21)    I&O's Summary    2023 07:01  -  2023 07:00  --------------------------------------------------------  IN: 280 mL / OUT: 3050 mL / NET: -2770 mL        CONSTITUTIONAL: NAD, well-developed  RESPIRATORY: Normal respiratory effort; lungs are clear to auscultation bilaterally  CARDIOVASCULAR: Regular rate and rhythm, normal S1 and S2, no murmur/rub/gallop; No lower extremity edema; Peripheral pulses are 2+ bilaterally  ABDOMEN: Nontender to palpation, normoactive bowel sounds, no rebound/guarding; No hepatosplenomegaly  MUSCLOSKELETAL: no clubbing or cyanosis of digits; no joint swelling or tenderness to palpation  PSYCH: A+O to person, place, and time; affect appropriate    LABS:                        8.2    12.24 )-----------( 304      ( 2023 07:58 )             24.4     -    136  |  100  |  105<H>  ----------------------------<  252<H>  5.2   |  18<L>  |  5.66<H>    Ca    8.1<L>      2023 23:54  Phos  8.4       Mg     2.1         TPro  6.1  /  Alb  3.3  /  TBili  0.2  /  DBili  x   /  AST  10  /  ALT  14  /  AlkPhos  43      PT/INR - ( 2023 09:04 )   PT: 9.8 sec;   INR: 0.85 ratio         PTT - ( 2023 09:04 )  PTT:25.1 sec      Urinalysis Basic - ( 2023 15:05 )    Color: Colorless / Appearance: Clear / S.010 / pH: x  Gluc: x / Ketone: Negative  / Bili: Negative / Urobili: Negative   Blood: x / Protein: 300 mg/dL / Nitrite: Negative   Leuk Esterase: Negative / RBC: 2 /hpf / WBC 2 /HPF   Sq Epi: x / Non Sq Epi: 2 /hpf / Bacteria: Negative        Culture - Urine (collected 2023 15:05)  Source: Clean Catch Clean Catch (Midstream)  Final Report (2023 15:00):    <10,000 CFU/mL Normal Urogenital Cassandra        IMAGING:    [X] All pertinent imaging reviewed by me Ambrose Moran MD   Internal Medicine, PGY 1  Contact via TEAMS.     SUBJECTIVE / OVERNIGHT EVENTS:  - Pt seen and examined at bedside  - Pt endorses improvement in breathing and LE swelling and pain. Endorses anxiety regarding possible dialysis.     MEDICATIONS  (STANDING):  amLODIPine   Tablet 10 milliGRAM(s) Oral daily  atorvastatin 80 milliGRAM(s) Oral at bedtime  buMETAnide Infusion 2 mG/Hr (10 mL/Hr) IV Continuous <Continuous>  carvedilol 6.25 milliGRAM(s) Oral every 12 hours  dextrose 5%. 1000 milliLiter(s) (50 mL/Hr) IV Continuous <Continuous>  dextrose 5%. 1000 milliLiter(s) (100 mL/Hr) IV Continuous <Continuous>  dextrose 50% Injectable 25 Gram(s) IV Push once  dextrose 50% Injectable 12.5 Gram(s) IV Push once  dextrose 50% Injectable 25 Gram(s) IV Push once  famotidine    Tablet 20 milliGRAM(s) Oral daily  fluticasone propionate 50 MICROgram(s)/spray Nasal Spray 1 Spray(s) Both Nostrils two times a day  glucagon  Injectable 1 milliGRAM(s) IntraMuscular once  heparin   Injectable 5000 Unit(s) SubCutaneous every 8 hours  hydrALAZINE 100 milliGRAM(s) Oral three times a day  influenza   Vaccine 0.5 milliLiter(s) IntraMuscular once  insulin glargine Injectable (LANTUS) 25 Unit(s) SubCutaneous at bedtime  insulin lispro (ADMELOG) corrective regimen sliding scale   SubCutaneous three times a day before meals  insulin lispro (ADMELOG) corrective regimen sliding scale   SubCutaneous at bedtime  insulin lispro Injectable (ADMELOG) 5 Unit(s) SubCutaneous three times a day before meals  predniSONE   Tablet 10 milliGRAM(s) Oral daily    MEDICATIONS  (PRN):  acetaminophen     Tablet .. 650 milliGRAM(s) Oral every 6 hours PRN Temp greater or equal to 38C (100.4F), Mild Pain (1 - 3)  aluminum hydroxide/magnesium hydroxide/simethicone Suspension 30 milliLiter(s) Oral every 4 hours PRN Dyspepsia  dextrose Oral Gel 15 Gram(s) Oral once PRN Blood Glucose LESS THAN 70 milliGRAM(s)/deciliter  melatonin 3 milliGRAM(s) Oral at bedtime PRN Insomnia  ondansetron Injectable 4 milliGRAM(s) IV Push every 8 hours PRN Nausea and/or Vomiting        23 @ 07:01  -  23 @ 07:00  --------------------------------------------------------  IN: 280 mL / OUT: 3050 mL / NET: -2770 mL        PHYSICAL EXAM:  Vital Signs Last 24 Hrs  T(C): 37.2 (2023 04:47), Max: 37.2 (2023 04:47)  T(F): 99 (2023 04:47), Max: 99 (2023 04:47)  HR: 100 (2023 04:47) (88 - 100)  BP: 147/75 (2023 04:47) (140/64 - 153/74)  BP(mean): --  RR: 18 (2023 04:47) (18 - 19)  SpO2: 96% (2023 04:47) (96% - 99%)    Parameters below as of 2023 04:47  Patient On (Oxygen Delivery Method): room air        CAPILLARY BLOOD GLUCOSE      POCT Blood Glucose.: 240 mg/dL (2023 22:10)  POCT Blood Glucose.: 349 mg/dL (2023 17:03)  POCT Blood Glucose.: 289 mg/dL (2023 11:31)  POCT Blood Glucose.: 70 mg/dL (2023 08:21)    I&O's Summary    2023 07:01  -  2023 07:00  --------------------------------------------------------  IN: 280 mL / OUT: 3050 mL / NET: -2770 mL        CONSTITUTIONAL: NAD  HEENT: NC/AT  RESPIRATORY: b/l crackles at lung bases   CARDIOVASCULAR: Regular rate and rhythm, normal S1 and S2, no murmur/rub/gallop; No lower extremity edema; Peripheral pulses are 2+ bilaterally  ABDOMEN: Nontender to palpation, normoactive bowel sounds, no rebound/guarding; No hepatosplenomegaly  MUSCLOSKELETAL: no clubbing or cyanosis of digits; no joint swelling or tenderness to palpation  EXTREMITIES: 1+ b/l LE edema, mildly TTP   NEURO: no focal deficits   PSYCH: A+O to person, place, and time; affect appropriate    LABS:                        8.2    12.24 )-----------( 304      ( 2023 07:58 )             24.4     02-21    136  |  100  |  105<H>  ----------------------------<  252<H>  5.2   |  18<L>  |  5.66<H>    Ca    8.1<L>      2023 23:54  Phos  8.4     -  Mg     2.1     -    TPro  6.1  /  Alb  3.3  /  TBili  0.2  /  DBili  x   /  AST  10  /  ALT  14  /  AlkPhos  43  -21    PT/INR - ( 2023 09:04 )   PT: 9.8 sec;   INR: 0.85 ratio         PTT - ( 2023 09:04 )  PTT:25.1 sec      Urinalysis Basic - ( 2023 15:05 )    Color: Colorless / Appearance: Clear / S.010 / pH: x  Gluc: x / Ketone: Negative  / Bili: Negative / Urobili: Negative   Blood: x / Protein: 300 mg/dL / Nitrite: Negative   Leuk Esterase: Negative / RBC: 2 /hpf / WBC 2 /HPF   Sq Epi: x / Non Sq Epi: 2 /hpf / Bacteria: Negative        Culture - Urine (collected 2023 15:05)  Source: Clean Catch Clean Catch (Midstream)  Final Report (2023 15:00):    <10,000 CFU/mL Normal Urogenital Cassandra        IMAGING:    [X] All pertinent imaging reviewed by me

## 2023-02-22 NOTE — PROGRESS NOTE ADULT - PROBLEM SELECTOR PLAN 2
-pt endorsing worsening LE edema and SOB since being d/c from hospital off of home torsemide   -pt w/ b/l infiltrates on CXR suspicious for pulmonary edema   -PE w/ crackles b/l   -volume overload due to under diuresis i/s/o CKD vs CHF  -pt s/p bumex 4 IV in ED   -nephro c/s; appreciate recs   -start bumex gtt 2cc/hr, goal urine o/p 1-2L/day  -strict Is/Os  -daily weights   -POCUS w/ grossly intact cardiac function   -f/u proBNP  -f/u TTE -pt endorsing worsening LE edema and SOB since being d/c from hospital off of home torsemide   -pt w/ b/l infiltrates on CXR suspicious for pulmonary edema   -PE w/ crackles b/l   -volume overload due to under diuresis i/s/o CKD vs CHF  -pt s/p bumex 4 IV in ED   -nephro c/s; appreciate recs   -start bumex gtt 2cc/hr, goal urine o/p 1-2L/day, net negative 2.7L   -strict Is/Os  -daily weights   -POCUS w/ grossly intact cardiac function   -proBNP elevated at 7900  -TTE w/ no systolic dysfunction, likely fluid overload i/s/o CKD

## 2023-02-22 NOTE — PROGRESS NOTE ADULT - SUBJECTIVE AND OBJECTIVE BOX
[facility] DIVISION OF KIDNEY DISEASES AND HYPERTENSION -- FOLLOW UP NOTE  --------------------------------------------------------------------------------  HPI:  Patient is a 52 year old female with PMH of Sarcoidosis, DM2, HTN and CKD stage 5 who presents to the hospital with worsening LE edema and shortness of breath. The nephrology team was consulted for advanced kidney disease and volume overload. On review of [facility] pt noted to have a SCr of 4.77 1/30/23 and on arrival today elevated to 5.15. Of note the patient was recently admitted to [facility] after presenting with LE pain and shortness of breath as well. The patient follows with Dr Jose M Garibay as her primary nephrologist, last seen in the office 2/15/23. At that time she was advised to increase her diuretics to Torsemide 20mg daily and states she has been adherent. However over the course of the past few days she noticed worsening edema and shortness of breath. She states the breathing worsens when she lays down flat. She admits she has been urinating well without issues. She denied any chest pain, nausea, vomiting, abdominal pain, change in appetite or taste.      Pt. seen this AM. Endorses feeling better with breathing. Swellign decreased. Good UOP. Pt wants to discuss PD dialysis.       PAST HISTORY  --------------------------------------------------------------------------------  No significant changes to PMH, PSH, FHx, SHx, unless otherwise noted    ALLERGIES & MEDICATIONS  --------------------------------------------------------------------------------  Allergies    No Known Allergies    Intolerances      Standing Inpatient Medications  amLODIPine   Tablet 10 milliGRAM(s) Oral daily  atorvastatin 80 milliGRAM(s) Oral at bedtime  buMETAnide Infusion 2 mG/Hr IV Continuous <Continuous>  carvedilol 6.25 milliGRAM(s) Oral every 12 hours  dextrose 5%. 1000 milliLiter(s) IV Continuous <Continuous>  dextrose 5%. 1000 milliLiter(s) IV Continuous <Continuous>  dextrose 50% Injectable 25 Gram(s) IV Push once  dextrose 50% Injectable 12.5 Gram(s) IV Push once  dextrose 50% Injectable 25 Gram(s) IV Push once  famotidine    Tablet 20 milliGRAM(s) Oral daily  fluticasone propionate 50 MICROgram(s)/spray Nasal Spray 1 Spray(s) Both Nostrils two times a day  glucagon  Injectable 1 milliGRAM(s) IntraMuscular once  heparin   Injectable 5000 Unit(s) SubCutaneous every 8 hours  hydrALAZINE 100 milliGRAM(s) Oral three times a day  influenza   Vaccine 0.5 milliLiter(s) IntraMuscular once  insulin glargine Injectable (LANTUS) 25 Unit(s) SubCutaneous at bedtime  insulin lispro (ADMELOG) corrective regimen sliding scale   SubCutaneous three times a day before meals  insulin lispro (ADMELOG) corrective regimen sliding scale   SubCutaneous at bedtime  insulin lispro Injectable (ADMELOG) 5 Unit(s) SubCutaneous three times a day before meals  predniSONE   Tablet 10 milliGRAM(s) Oral daily    PRN Inpatient Medications  acetaminophen     Tablet .. 650 milliGRAM(s) Oral every 6 hours PRN  aluminum hydroxide/magnesium hydroxide/simethicone Suspension 30 milliLiter(s) Oral every 4 hours PRN  dextrose Oral Gel 15 Gram(s) Oral once PRN  melatonin 3 milliGRAM(s) Oral at bedtime PRN  ondansetron Injectable 4 milliGRAM(s) IV Push every 8 hours PRN      REVIEW OF SYSTEMS  --------------------------------------------------------------------------------  As per HPI    VITALS/PHYSICAL EXAM  --------------------------------------------------------------------------------  T(C): 37.2 (02-22-23 @ 04:47), Max: 37.2 (02-22-23 @ 04:47)  HR: 100 (02-22-23 @ 04:47) (88 - 100)  BP: 147/75 (02-22-23 @ 04:47) (140/64 - 153/74)  RR: 18 (02-22-23 @ 04:47) (18 - 19)  SpO2: 96% (02-22-23 @ 04:47) (96% - 99%)  Wt(kg): --        02-21-23 @ 07:01  -  02-22-23 @ 07:00  --------------------------------------------------------  IN: 280 mL / OUT: 3050 mL / NET: -2770 mL      Physical Exam:  	Gen: ill appearing  	HEENT: MMM  	Pulm: CTAB anteriorly, on NC  	CV: S1S2  	Abd: Soft, +BS   	Ext: + LE edema B/L, tender to palpation  	Neuro: Awake and alert   	Skin: Warm and dry        LABS/STUDIES  --------------------------------------------------------------------------------              7.7    8.76  >-----------<  287      [02-22-23 @ 07:12]              23.8     140  |  101  |  104  ----------------------------<  81      [02-22-23 @ 07:14]  4.8   |  19  |  5.88        Ca     8.4     [02-22-23 @ 07:14]      Mg     2.2     [02-22-23 @ 07:14]      Phos  8.6     [02-22-23 @ 07:14]    TPro  6.1  /  Alb  3.3  /  TBili  0.2  /  DBili  x   /  AST  13  /  ALT  16  /  AlkPhos  39  [02-22-23 @ 07:14]      Creatinine Trend:  SCr 5.88 [02-22 @ 07:14]  SCr 5.66 [02-21 @ 23:54]  SCr 5.52 [02-21 @ 15:02]  SCr 5.31 [02-21 @ 07:58]  SCr 5.25 [02-20 @ 22:52]    Urinalysis - [02-20-23 @ 15:05]      Color Colorless / Appearance Clear / SG 1.010 / pH 6.5      Gluc 200 mg/dL / Ketone Negative  / Bili Negative / Urobili Negative       Blood Negative / Protein 300 mg/dL / Leuk Est Negative / Nitrite Negative      RBC 2 / WBC 2 / Hyaline 1 / Gran  / Sq Epi  / Non Sq Epi 2 / Bacteria Negative    Urine Creatinine 17      [02-22-23 @ 07:15]  Urine Protein 206      [02-22-23 @ 07:15]  Urine Sodium 100      [02-20-23 @ 22:52]  Urine Urea Nitrogen 237      [02-20-23 @ 22:52]  Urine Potassium 26      [02-20-23 @ 22:52]  Urine Osmolality 339      [02-20-23 @ 22:52]    Iron 48, TIBC 267, %sat 18      [01-31-23 @ 12:34]  Ferritin 58      [01-31-23 @ 12:34]  PTH -- (Ca 8.1)      [01-31-23 @ 12:34]   98  Vitamin D (25OH) 7.6      [01-27-23 @ 18:12]  HbA1c 11.2      [08-09-19 @ 21:20]  TSH 1.71      [01-28-23 @ 07:29]  Lipid: chol 254, , , LDL --      [02-21-23 @ 07:58]      JOSLYN: titer Negative, pattern --      [01-28-23 @ 07:29]  dsDNA <12      [01-28-23 @ 07:29]

## 2023-02-22 NOTE — PROGRESS NOTE ADULT - PROBLEM SELECTOR PLAN 1
Pt. with MIKALA on advanced CKD likely in setting of volume overload and progression of her underlying disease. On review of [facility] pt noted to have a SCr of 4.77 1/30/23 and trended to 5.88 today. Primary nephrologist is Dr. Jose M Garibay, last seen in office 2/15/23. CKD thought to be in setting of advanced diabetic nephropathy. Send UA, urine electrolytes, spot urine TP/CR (previously elevated to 22.6g). Labs reviewed, pt clinically volume overloaded. C/w Bumex gtt. No acute indication for RRT at this time, however explained to patient that should her renal function worsen or unable to improve her volume status then there is a chance she may require it. It would be beneficial for patient to get AVF inpatient. Will continue to discuss with patient. Discussed possibility of starting PD dialysis. Pt. wants to discuss with family.    Please obtain daily weights.     Check TSAT. May require PRESTON.    Monitor labs and strict urine output. Avoid nephrotoxins. Dose medications as per eGFR.    If you have any questions, please feel free to contact me  Lito Oglesby  Nephrology Fellow  [phone]; Prefer [employer] TEAMS  (After 5pm or on weekends please page the on-call fellow) Pt. with MIKALA on advanced CKD likely in setting of volume overload and progression of her underlying disease. On review of [facility] pt noted to have a SCr of 4.77 1/30/23 and trended to 5.88 today. Primary nephrologist is Dr. Jose M Garibay, last seen in office 2/15/23. CKD thought to be in setting of advanced diabetic nephropathy. Send UA, urine electrolytes, spot urine TP/CR (previously elevated to 22.6g). Labs reviewed, pt clinically volume overloaded. C/w Bumex gtt. No acute indication for RRT at this time, however explained to patient that should her renal function worsen or unable to improve her volume status then there is a chance she may require it. It would be beneficial for patient to get AVF inpatient. Will continue to discuss with patient. Discussed possibility of starting PD dialysis. Pt. wants to discuss with family.    Please obtain daily weights.     Check TSAT and ferritin. May require PRESTON.    Check blood gas. Start NaHCO3 650mg TID    Check phos and calcium daily. check PTH. Start Sevelamer 800mg TID with meals.    Monitor labs and strict urine output. Avoid nephrotoxins. Dose medications as per eGFR.    If you have any questions, please feel free to contact me  Lito Oglesby  Nephrology Fellow  [phone]; Prefer [employer] TEAMS  (After 5pm or on weekends please page the on-call fellow) Pt. with MIKALA on advanced CKD likely in setting of volume overload and progression of her underlying disease. On review of [facility] pt noted to have a SCr of 4.77 1/30/23 and trended to 5.88 today. Primary nephrologist is Dr. Jose M Garibay, last seen in office 2/15/23. CKD thought to be in setting of advanced diabetic nephropathy. Send UA, urine electrolytes, spot urine TP/CR (previously elevated to 22.6g). Labs reviewed, pt clinically volume overloaded. C/w Bumex gtt. No acute indication for RRT at this time, however explained to patient that should her renal function worsen or unable to improve her volume status then there is a chance she may require it. It would be beneficial for patient to get AVF inpatient. Will continue to discuss with patient. Discussed possibility of starting PD dialysis. Pt. wants to discuss with family.    Please obtain daily weights. Can give Metolazone 5mg Daily    Check TSAT and ferritin. Will start PRESTON.    Check blood gas. Start NaHCO3 650mg TID    Check phos and calcium daily. check PTH. Start Sevelamer 800mg TID with meals.    Monitor labs and strict urine output. Avoid nephrotoxins. Dose medications as per eGFR.    If you have any questions, please feel free to contact me  Lito Oglesby  Nephrology Fellow  [phone]; Prefer [employer] TEAMS  (After 5pm or on weekends please page the on-call fellow)

## 2023-02-23 LAB
ANION GAP SERPL CALC-SCNC: 19 MMOL/L — HIGH (ref 5–17)
ANION GAP SERPL CALC-SCNC: 20 MMOL/L — HIGH (ref 5–17)
ANION GAP SERPL CALC-SCNC: 23 MMOL/L — HIGH (ref 5–17)
BLD GP AB SCN SERPL QL: NEGATIVE — SIGNIFICANT CHANGE UP
BUN SERPL-MCNC: 113 MG/DL — HIGH (ref 7–23)
BUN SERPL-MCNC: 119 MG/DL — HIGH (ref 7–23)
BUN SERPL-MCNC: 121 MG/DL — HIGH (ref 7–23)
CALCIUM SERPL-MCNC: 8.1 MG/DL — LOW (ref 8.4–10.5)
CALCIUM SERPL-MCNC: 8.1 MG/DL — LOW (ref 8.4–10.5)
CALCIUM SERPL-MCNC: 8.2 MG/DL — LOW (ref 8.4–10.5)
CALCIUM SERPL-MCNC: 8.4 MG/DL — SIGNIFICANT CHANGE UP (ref 8.4–10.5)
CHLORIDE SERPL-SCNC: 94 MMOL/L — LOW (ref 96–108)
CHLORIDE SERPL-SCNC: 96 MMOL/L — SIGNIFICANT CHANGE UP (ref 96–108)
CHLORIDE SERPL-SCNC: 97 MMOL/L — SIGNIFICANT CHANGE UP (ref 96–108)
CO2 SERPL-SCNC: 17 MMOL/L — LOW (ref 22–31)
CO2 SERPL-SCNC: 20 MMOL/L — LOW (ref 22–31)
CO2 SERPL-SCNC: 22 MMOL/L — SIGNIFICANT CHANGE UP (ref 22–31)
CREAT SERPL-MCNC: 5.92 MG/DL — HIGH (ref 0.5–1.3)
CREAT SERPL-MCNC: 5.92 MG/DL — HIGH (ref 0.5–1.3)
CREAT SERPL-MCNC: 5.93 MG/DL — HIGH (ref 0.5–1.3)
EGFR: 8 ML/MIN/1.73M2 — LOW
FERRITIN SERPL-MCNC: 22 NG/ML — SIGNIFICANT CHANGE UP (ref 15–150)
GLUCOSE BLDC GLUCOMTR-MCNC: 122 MG/DL — HIGH (ref 70–99)
GLUCOSE BLDC GLUCOMTR-MCNC: 154 MG/DL — HIGH (ref 70–99)
GLUCOSE BLDC GLUCOMTR-MCNC: 235 MG/DL — HIGH (ref 70–99)
GLUCOSE BLDC GLUCOMTR-MCNC: 98 MG/DL — SIGNIFICANT CHANGE UP (ref 70–99)
GLUCOSE SERPL-MCNC: 127 MG/DL — HIGH (ref 70–99)
GLUCOSE SERPL-MCNC: 152 MG/DL — HIGH (ref 70–99)
GLUCOSE SERPL-MCNC: 338 MG/DL — HIGH (ref 70–99)
HCT VFR BLD CALC: 22.7 % — LOW (ref 34.5–45)
HGB BLD-MCNC: 7.3 G/DL — LOW (ref 11.5–15.5)
IRON SATN MFR SERPL: 12 % — LOW (ref 14–50)
IRON SATN MFR SERPL: 33 UG/DL — SIGNIFICANT CHANGE UP (ref 30–160)
MAGNESIUM SERPL-MCNC: 2.2 MG/DL — SIGNIFICANT CHANGE UP (ref 1.6–2.6)
MAGNESIUM SERPL-MCNC: 2.2 MG/DL — SIGNIFICANT CHANGE UP (ref 1.6–2.6)
MAGNESIUM SERPL-MCNC: 2.3 MG/DL — SIGNIFICANT CHANGE UP (ref 1.6–2.6)
MCHC RBC-ENTMCNC: 27.2 PG — SIGNIFICANT CHANGE UP (ref 27–34)
MCHC RBC-ENTMCNC: 32.2 GM/DL — SIGNIFICANT CHANGE UP (ref 32–36)
MCV RBC AUTO: 84.7 FL — SIGNIFICANT CHANGE UP (ref 80–100)
NRBC # BLD: 0 /100 WBCS — SIGNIFICANT CHANGE UP (ref 0–0)
PHOSPHATE SERPL-MCNC: 8.7 MG/DL — HIGH (ref 2.5–4.5)
PHOSPHATE SERPL-MCNC: 9.6 MG/DL — HIGH (ref 2.5–4.5)
PHOSPHATE SERPL-MCNC: 9.7 MG/DL — HIGH (ref 2.5–4.5)
PLATELET # BLD AUTO: 275 K/UL — SIGNIFICANT CHANGE UP (ref 150–400)
POTASSIUM SERPL-MCNC: 4.3 MMOL/L — SIGNIFICANT CHANGE UP (ref 3.5–5.3)
POTASSIUM SERPL-MCNC: 4.8 MMOL/L — SIGNIFICANT CHANGE UP (ref 3.5–5.3)
POTASSIUM SERPL-MCNC: 5.3 MMOL/L — SIGNIFICANT CHANGE UP (ref 3.5–5.3)
POTASSIUM SERPL-SCNC: 4.3 MMOL/L — SIGNIFICANT CHANGE UP (ref 3.5–5.3)
POTASSIUM SERPL-SCNC: 4.8 MMOL/L — SIGNIFICANT CHANGE UP (ref 3.5–5.3)
POTASSIUM SERPL-SCNC: 5.3 MMOL/L — SIGNIFICANT CHANGE UP (ref 3.5–5.3)
PTH-INTACT FLD-MCNC: 218 PG/ML — HIGH (ref 15–65)
RBC # BLD: 2.68 M/UL — LOW (ref 3.8–5.2)
RBC # FLD: 13.2 % — SIGNIFICANT CHANGE UP (ref 10.3–14.5)
RH IG SCN BLD-IMP: NEGATIVE — SIGNIFICANT CHANGE UP
SODIUM SERPL-SCNC: 135 MMOL/L — SIGNIFICANT CHANGE UP (ref 135–145)
SODIUM SERPL-SCNC: 136 MMOL/L — SIGNIFICANT CHANGE UP (ref 135–145)
SODIUM SERPL-SCNC: 137 MMOL/L — SIGNIFICANT CHANGE UP (ref 135–145)
TIBC SERPL-MCNC: 280 UG/DL — SIGNIFICANT CHANGE UP (ref 220–430)
UIBC SERPL-MCNC: 247 UG/DL — SIGNIFICANT CHANGE UP (ref 110–370)
WBC # BLD: 7.99 K/UL — SIGNIFICANT CHANGE UP (ref 3.8–10.5)
WBC # FLD AUTO: 7.99 K/UL — SIGNIFICANT CHANGE UP (ref 3.8–10.5)

## 2023-02-23 PROCEDURE — 99232 SBSQ HOSP IP/OBS MODERATE 35: CPT

## 2023-02-23 PROCEDURE — 99233 SBSQ HOSP IP/OBS HIGH 50: CPT | Mod: GC

## 2023-02-23 RX ORDER — INSULIN LISPRO 100/ML
7 VIAL (ML) SUBCUTANEOUS
Refills: 0 | Status: DISCONTINUED | OUTPATIENT
Start: 2023-02-23 | End: 2023-02-25

## 2023-02-23 RX ORDER — BUMETANIDE 0.25 MG/ML
4 INJECTION INTRAMUSCULAR; INTRAVENOUS EVERY 8 HOURS
Refills: 0 | Status: DISCONTINUED | OUTPATIENT
Start: 2023-02-23 | End: 2023-02-24

## 2023-02-23 RX ADMIN — SEVELAMER CARBONATE 800 MILLIGRAM(S): 2400 POWDER, FOR SUSPENSION ORAL at 08:50

## 2023-02-23 RX ADMIN — Medication 650 MILLIGRAM(S): at 21:46

## 2023-02-23 RX ADMIN — HEPARIN SODIUM 5000 UNIT(S): 5000 INJECTION INTRAVENOUS; SUBCUTANEOUS at 21:48

## 2023-02-23 RX ADMIN — Medication 1 SPRAY(S): at 06:22

## 2023-02-23 RX ADMIN — Medication 10 MILLIGRAM(S): at 06:19

## 2023-02-23 RX ADMIN — Medication 650 MILLIGRAM(S): at 06:19

## 2023-02-23 RX ADMIN — AMLODIPINE BESYLATE 10 MILLIGRAM(S): 2.5 TABLET ORAL at 06:19

## 2023-02-23 RX ADMIN — Medication 7 UNIT(S): at 18:07

## 2023-02-23 RX ADMIN — BUMETANIDE 10 MG/HR: 0.25 INJECTION INTRAMUSCULAR; INTRAVENOUS at 06:48

## 2023-02-23 RX ADMIN — Medication 100 MILLIGRAM(S): at 06:20

## 2023-02-23 RX ADMIN — BUMETANIDE 132 MILLIGRAM(S): 0.25 INJECTION INTRAMUSCULAR; INTRAVENOUS at 21:47

## 2023-02-23 RX ADMIN — Medication 1: at 18:07

## 2023-02-23 RX ADMIN — ATORVASTATIN CALCIUM 80 MILLIGRAM(S): 80 TABLET, FILM COATED ORAL at 21:46

## 2023-02-23 RX ADMIN — Medication 5 UNIT(S): at 09:26

## 2023-02-23 RX ADMIN — Medication 650 MILLIGRAM(S): at 13:24

## 2023-02-23 RX ADMIN — Medication 1 SPRAY(S): at 17:57

## 2023-02-23 RX ADMIN — Medication 2: at 13:21

## 2023-02-23 RX ADMIN — Medication 100 MILLIGRAM(S): at 21:46

## 2023-02-23 RX ADMIN — SEVELAMER CARBONATE 800 MILLIGRAM(S): 2400 POWDER, FOR SUSPENSION ORAL at 12:02

## 2023-02-23 RX ADMIN — Medication 100 MILLIGRAM(S): at 13:24

## 2023-02-23 RX ADMIN — SEVELAMER CARBONATE 800 MILLIGRAM(S): 2400 POWDER, FOR SUSPENSION ORAL at 17:58

## 2023-02-23 RX ADMIN — BUMETANIDE 10 MG/HR: 0.25 INJECTION INTRAMUSCULAR; INTRAVENOUS at 08:50

## 2023-02-23 RX ADMIN — Medication 7 UNIT(S): at 13:21

## 2023-02-23 RX ADMIN — HEPARIN SODIUM 5000 UNIT(S): 5000 INJECTION INTRAVENOUS; SUBCUTANEOUS at 06:20

## 2023-02-23 RX ADMIN — HEPARIN SODIUM 5000 UNIT(S): 5000 INJECTION INTRAVENOUS; SUBCUTANEOUS at 13:25

## 2023-02-23 RX ADMIN — CARVEDILOL PHOSPHATE 6.25 MILLIGRAM(S): 80 CAPSULE, EXTENDED RELEASE ORAL at 06:19

## 2023-02-23 RX ADMIN — INSULIN GLARGINE 25 UNIT(S): 100 INJECTION, SOLUTION SUBCUTANEOUS at 21:20

## 2023-02-23 RX ADMIN — BUMETANIDE 132 MILLIGRAM(S): 0.25 INJECTION INTRAMUSCULAR; INTRAVENOUS at 14:08

## 2023-02-23 RX ADMIN — CARVEDILOL PHOSPHATE 6.25 MILLIGRAM(S): 80 CAPSULE, EXTENDED RELEASE ORAL at 17:58

## 2023-02-23 RX ADMIN — FAMOTIDINE 20 MILLIGRAM(S): 10 INJECTION INTRAVENOUS at 12:02

## 2023-02-23 NOTE — PROGRESS NOTE ADULT - PROBLEM SELECTOR PLAN 2
-pt endorsing worsening LE edema and SOB since being d/c from hospital off of home torsemide   -pt w/ b/l infiltrates on CXR suspicious for pulmonary edema   -PE w/ crackles b/l   -volume overload due to under diuresis i/s/o CKD vs CHF  -pt s/p bumex 4 IV in ED   -nephro c/s; appreciate recs   -start bumex gtt 2cc/hr, goal urine o/p 1-2L/day, net negative 2.7L   -strict Is/Os  -daily weights   -POCUS w/ grossly intact cardiac function   -proBNP elevated at 7900  -TTE w/ no systolic dysfunction, likely fluid overload i/s/o CKD

## 2023-02-23 NOTE — PHYSICAL THERAPY INITIAL EVALUATION ADULT - BED MOBILITY LIMITATIONS, REHAB EVAL
pt moves her LE s with assist of her UEs 2/2 edema in eamon LEs/decreased ability to use legs for bridging/pushing

## 2023-02-23 NOTE — PHYSICAL THERAPY INITIAL EVALUATION ADULT - PLANNED THERAPY INTERVENTIONS, PT EVAL
Stairs Goal: Pt will go up/down 10 steps with + handrail independently in 2 weeks./balance training/gait training/strengthening

## 2023-02-23 NOTE — PROGRESS NOTE ADULT - SUBJECTIVE AND OBJECTIVE BOX
[facility] DIVISION OF KIDNEY DISEASES AND HYPERTENSION -- FOLLOW UP NOTE  --------------------------------------------------------------------------------  HPI:  Patient is a 52 year old female with PMH of Sarcoidosis, DM2, HTN and CKD stage 5 who presents to the hospital with worsening LE edema and shortness of breath. The nephrology team was consulted for advanced kidney disease and volume overload. On review of [facility] pt noted to have a SCr of 4.77 1/30/23 and on arrival today elevated to 5.15. Of note the patient was recently admitted to [facility] after presenting with LE pain and shortness of breath as well. The patient follows with Dr Jose M Garibay as her primary nephrologist, last seen in the office 2/15/23. At that time she was advised to increase her diuretics to Torsemide 20mg daily and states she has been adherent. However over the course of the past few days she noticed worsening edema and shortness of breath. She states the breathing worsens when she lays down flat. She admits she has been urinating well without issues. She denied any chest pain, nausea, vomiting, abdominal pain, change in appetite or taste.      Pt. seen this AM. Endorses feeling better with breathing. Swelling decreased. Good UOP. Discussed PD with Pt. but after discussion with Dr. Garibay, outpatient nephrologist, pt with uncontrolled diabetes with A1c in 11-14 range over the last 5 years. Pt. here with uncontrolled diabetes Discussed that this will make her DM even more difficult to manage and that we will not be able to get good clearance or UF with PD. She wants to think and discuss more with family     PAST HISTORY  --------------------------------------------------------------------------------  No significant changes to PMH, PSH, FHx, SHx, unless otherwise noted    ALLERGIES & MEDICATIONS  --------------------------------------------------------------------------------  Allergies    No Known Allergies    Intolerances      Standing Inpatient Medications  amLODIPine   Tablet 10 milliGRAM(s) Oral daily  atorvastatin 80 milliGRAM(s) Oral at bedtime  buMETAnide Infusion 2 mG/Hr IV Continuous <Continuous>  carvedilol 6.25 milliGRAM(s) Oral every 12 hours  dextrose 5%. 1000 milliLiter(s) IV Continuous <Continuous>  dextrose 5%. 1000 milliLiter(s) IV Continuous <Continuous>  dextrose 50% Injectable 25 Gram(s) IV Push once  dextrose 50% Injectable 12.5 Gram(s) IV Push once  dextrose 50% Injectable 25 Gram(s) IV Push once  epoetin suzette-epbx (RETACRIT) Injectable 6000 Unit(s) SubCutaneous once  famotidine    Tablet 20 milliGRAM(s) Oral daily  fluticasone propionate 50 MICROgram(s)/spray Nasal Spray 1 Spray(s) Both Nostrils two times a day  glucagon  Injectable 1 milliGRAM(s) IntraMuscular once  heparin   Injectable 5000 Unit(s) SubCutaneous every 8 hours  hydrALAZINE 100 milliGRAM(s) Oral three times a day  influenza   Vaccine 0.5 milliLiter(s) IntraMuscular once  insulin glargine Injectable (LANTUS) 25 Unit(s) SubCutaneous at bedtime  insulin lispro (ADMELOG) corrective regimen sliding scale   SubCutaneous three times a day before meals  insulin lispro (ADMELOG) corrective regimen sliding scale   SubCutaneous at bedtime  insulin lispro Injectable (ADMELOG) 5 Unit(s) SubCutaneous three times a day before meals  metolazone 5 milliGRAM(s) Oral daily  predniSONE   Tablet 10 milliGRAM(s) Oral daily  sevelamer carbonate 800 milliGRAM(s) Oral three times a day with meals  sodium bicarbonate 650 milliGRAM(s) Oral three times a day    PRN Inpatient Medications  acetaminophen     Tablet .. 650 milliGRAM(s) Oral every 6 hours PRN  aluminum hydroxide/magnesium hydroxide/simethicone Suspension 30 milliLiter(s) Oral every 4 hours PRN  dextrose Oral Gel 15 Gram(s) Oral once PRN  melatonin 3 milliGRAM(s) Oral at bedtime PRN  ondansetron Injectable 4 milliGRAM(s) IV Push every 8 hours PRN      REVIEW OF SYSTEMS  --------------------------------------------------------------------------------  Gen: No fevers/chills  Skin: No rashes  Head/Eyes/Ears: Normal hearing,   Respiratory: No dyspnea, cough  CV: No chest pain  GI: No abdominal pain, diarrhea  : No dysuria, hematuria  MSK: No  edema  Heme: No easy bruising or bleeding  Psych: No significant depression      All other systems were reviewed and are negative, except as noted.    VITALS/PHYSICAL EXAM  --------------------------------------------------------------------------------  T(C): 36.4 (02-23-23 @ 04:28), Max: 36.9 (02-22-23 @ 12:08)  HR: 95 (02-23-23 @ 04:28) (95 - 96)  BP: 157/79 (02-23-23 @ 04:28) (155/80 - 160/75)  RR: 18 (02-23-23 @ 04:28) (18 - 18)  SpO2: 98% (02-23-23 @ 04:28) (98% - 100%)  Wt(kg): --        02-22-23 @ 07:01  -  02-23-23 @ 07:00  --------------------------------------------------------  IN: 450 mL / OUT: 4450 mL / NET: -4000 mL        Physical Exam:  	Gen: NAD  	HEENT: MMM  	Pulm: CTA B/L  	CV: S1S2  	Abd: Soft, +BS   	Ext: No LE edema B/L  	Neuro: Awake  	Skin: Warm and dry  	Vascular access:      LABS/STUDIES  --------------------------------------------------------------------------------              7.3    7.99  >-----------<  275      [02-23-23 @ 06:50]              22.7     137  |  97  |  121  ----------------------------<  127      [02-23-23 @ 06:50]  4.3   |  20  |  5.92        Ca     8.2     [02-23-23 @ 06:50]      Mg     2.2     [02-23-23 @ 06:50]      Phos  9.6     [02-23-23 @ 06:50]    TPro  6.1  /  Alb  3.3  /  TBili  0.2  /  DBili  x   /  AST  13  /  ALT  16  /  AlkPhos  39  [02-22-23 @ 07:14]          Creatinine Trend:  SCr 5.92 [02-23 @ 06:50]  SCr 5.93 [02-23 @ 01:02]  SCr 5.94 [02-22 @ 17:33]  SCr 5.88 [02-22 @ 07:14]  SCr 5.66 [02-21 @ 23:54]    Urinalysis - [02-20-23 @ 15:05]      Color Colorless / Appearance Clear / SG 1.010 / pH 6.5      Gluc 200 mg/dL / Ketone Negative  / Bili Negative / Urobili Negative       Blood Negative / Protein 300 mg/dL / Leuk Est Negative / Nitrite Negative      RBC 2 / WBC 2 / Hyaline 1 / Gran  / Sq Epi  / Non Sq Epi 2 / Bacteria Negative    Urine Creatinine 17      [02-22-23 @ 07:15]  Urine Protein 206      [02-22-23 @ 07:15]  Urine Sodium 100      [02-20-23 @ 22:52]  Urine Urea Nitrogen 237      [02-20-23 @ 22:52]  Urine Potassium 26      [02-20-23 @ 22:52]  Urine Osmolality 339      [02-20-23 @ 22:52]    Iron 48, TIBC 267, %sat 18      [01-31-23 @ 12:34]  Ferritin 58      [01-31-23 @ 12:34]  PTH -- (Ca 8.0)      [02-22-23 @ 17:33]   233  PTH -- (Ca 8.1)      [01-31-23 @ 12:34]   98  Vitamin D (25OH) 7.6      [01-27-23 @ 18:12]  HbA1c 11.2      [08-09-19 @ 21:20]  TSH 1.71      [01-28-23 @ 07:29]  Lipid: chol 254, , , LDL --      [02-21-23 @ 07:58]      JOSLYN: titer Negative, pattern --      [01-28-23 @ 07:29]  dsDNA <12      [01-28-23 @ 07:29]

## 2023-02-23 NOTE — PROGRESS NOTE ADULT - SUBJECTIVE AND OBJECTIVE BOX
Ambrose Moran MD   Internal Medicine, PGY 1  Contact via TEAMS.     SUBJECTIVE / OVERNIGHT EVENTS:  - Pt seen and examined at bedside  - NADER    MEDICATIONS  (STANDING):  amLODIPine   Tablet 10 milliGRAM(s) Oral daily  atorvastatin 80 milliGRAM(s) Oral at bedtime  buMETAnide Infusion 2 mG/Hr (10 mL/Hr) IV Continuous <Continuous>  carvedilol 6.25 milliGRAM(s) Oral every 12 hours  dextrose 5%. 1000 milliLiter(s) (50 mL/Hr) IV Continuous <Continuous>  dextrose 5%. 1000 milliLiter(s) (100 mL/Hr) IV Continuous <Continuous>  dextrose 50% Injectable 25 Gram(s) IV Push once  dextrose 50% Injectable 12.5 Gram(s) IV Push once  dextrose 50% Injectable 25 Gram(s) IV Push once  epoetin suzette-epbx (RETACRIT) Injectable 6000 Unit(s) SubCutaneous once  famotidine    Tablet 20 milliGRAM(s) Oral daily  fluticasone propionate 50 MICROgram(s)/spray Nasal Spray 1 Spray(s) Both Nostrils two times a day  glucagon  Injectable 1 milliGRAM(s) IntraMuscular once  heparin   Injectable 5000 Unit(s) SubCutaneous every 8 hours  hydrALAZINE 100 milliGRAM(s) Oral three times a day  influenza   Vaccine 0.5 milliLiter(s) IntraMuscular once  insulin glargine Injectable (LANTUS) 25 Unit(s) SubCutaneous at bedtime  insulin lispro (ADMELOG) corrective regimen sliding scale   SubCutaneous three times a day before meals  insulin lispro (ADMELOG) corrective regimen sliding scale   SubCutaneous at bedtime  insulin lispro Injectable (ADMELOG) 5 Unit(s) SubCutaneous three times a day before meals  metolazone 5 milliGRAM(s) Oral daily  predniSONE   Tablet 10 milliGRAM(s) Oral daily  sevelamer carbonate 800 milliGRAM(s) Oral three times a day with meals  sodium bicarbonate 650 milliGRAM(s) Oral three times a day    MEDICATIONS  (PRN):  acetaminophen     Tablet .. 650 milliGRAM(s) Oral every 6 hours PRN Temp greater or equal to 38C (100.4F), Mild Pain (1 - 3)  aluminum hydroxide/magnesium hydroxide/simethicone Suspension 30 milliLiter(s) Oral every 4 hours PRN Dyspepsia  dextrose Oral Gel 15 Gram(s) Oral once PRN Blood Glucose LESS THAN 70 milliGRAM(s)/deciliter  melatonin 3 milliGRAM(s) Oral at bedtime PRN Insomnia  ondansetron Injectable 4 milliGRAM(s) IV Push every 8 hours PRN Nausea and/or Vomiting        02-22-23 @ 07:01  -  02-23-23 @ 07:00  --------------------------------------------------------  IN: 450 mL / OUT: 4450 mL / NET: -4000 mL        PHYSICAL EXAM:  Vital Signs Last 24 Hrs  T(C): 36.4 (23 Feb 2023 04:28), Max: 36.9 (22 Feb 2023 12:08)  T(F): 97.6 (23 Feb 2023 04:28), Max: 98.4 (22 Feb 2023 12:08)  HR: 95 (23 Feb 2023 04:28) (95 - 96)  BP: 157/79 (23 Feb 2023 04:28) (155/80 - 160/75)  BP(mean): --  RR: 18 (23 Feb 2023 04:28) (18 - 18)  SpO2: 98% (23 Feb 2023 04:28) (98% - 100%)    Parameters below as of 23 Feb 2023 04:28  Patient On (Oxygen Delivery Method): room air        CAPILLARY BLOOD GLUCOSE      POCT Blood Glucose.: 319 mg/dL (22 Feb 2023 21:10)  POCT Blood Glucose.: 229 mg/dL (22 Feb 2023 17:11)  POCT Blood Glucose.: 178 mg/dL (22 Feb 2023 12:41)  POCT Blood Glucose.: 143 mg/dL (22 Feb 2023 08:45)    I&O's Summary    22 Feb 2023 07:01  -  23 Feb 2023 07:00  --------------------------------------------------------  IN: 450 mL / OUT: 4450 mL / NET: -4000 mL        CONSTITUTIONAL: NAD, well-developed  RESPIRATORY: Normal respiratory effort; lungs are clear to auscultation bilaterally  CARDIOVASCULAR: Regular rate and rhythm, normal S1 and S2, no murmur/rub/gallop; No lower extremity edema; Peripheral pulses are 2+ bilaterally  ABDOMEN: Nontender to palpation, normoactive bowel sounds, no rebound/guarding; No hepatosplenomegaly  MUSCLOSKELETAL: no clubbing or cyanosis of digits; no joint swelling or tenderness to palpation  PSYCH: A+O to person, place, and time; affect appropriate    LABS:                        7.3    7.99  )-----------( 275      ( 23 Feb 2023 06:50 )             22.7     02-23    137  |  97  |  121<H>  ----------------------------<  127<H>  4.3   |  20<L>  |  5.92<H>    Ca    8.2<L>      23 Feb 2023 06:50  Phos  9.6     02-23  Mg     2.2     02-23    TPro  6.1  /  Alb  3.3  /  TBili  0.2  /  DBili  x   /  AST  13  /  ALT  16  /  AlkPhos  39<L>  02-22              Culture - Blood (collected 21 Feb 2023 05:00)  Source: .Blood Blood-Peripheral  Preliminary Report (22 Feb 2023 12:01):    No growth to date.    Culture - Blood (collected 21 Feb 2023 04:50)  Source: .Blood Blood-Peripheral  Preliminary Report (22 Feb 2023 12:01):    No growth to date.    Culture - Urine (collected 20 Feb 2023 15:05)  Source: Clean Catch Clean Catch (Midstream)  Final Report (21 Feb 2023 15:00):    <10,000 CFU/mL Normal Urogenital Cassandra        IMAGING:    [X] All pertinent imaging reviewed by me Ambrose Moran MD   Internal Medicine, PGY 1  Contact via TEAMS.     SUBJECTIVE / OVERNIGHT EVENTS:  - Pt seen and examined at bedside  - NADER    MEDICATIONS  (STANDING):  amLODIPine   Tablet 10 milliGRAM(s) Oral daily  atorvastatin 80 milliGRAM(s) Oral at bedtime  buMETAnide Infusion 2 mG/Hr (10 mL/Hr) IV Continuous <Continuous>  carvedilol 6.25 milliGRAM(s) Oral every 12 hours  dextrose 5%. 1000 milliLiter(s) (50 mL/Hr) IV Continuous <Continuous>  dextrose 5%. 1000 milliLiter(s) (100 mL/Hr) IV Continuous <Continuous>  dextrose 50% Injectable 25 Gram(s) IV Push once  dextrose 50% Injectable 12.5 Gram(s) IV Push once  dextrose 50% Injectable 25 Gram(s) IV Push once  epoetin suzette-epbx (RETACRIT) Injectable 6000 Unit(s) SubCutaneous once  famotidine    Tablet 20 milliGRAM(s) Oral daily  fluticasone propionate 50 MICROgram(s)/spray Nasal Spray 1 Spray(s) Both Nostrils two times a day  glucagon  Injectable 1 milliGRAM(s) IntraMuscular once  heparin   Injectable 5000 Unit(s) SubCutaneous every 8 hours  hydrALAZINE 100 milliGRAM(s) Oral three times a day  influenza   Vaccine 0.5 milliLiter(s) IntraMuscular once  insulin glargine Injectable (LANTUS) 25 Unit(s) SubCutaneous at bedtime  insulin lispro (ADMELOG) corrective regimen sliding scale   SubCutaneous three times a day before meals  insulin lispro (ADMELOG) corrective regimen sliding scale   SubCutaneous at bedtime  insulin lispro Injectable (ADMELOG) 5 Unit(s) SubCutaneous three times a day before meals  metolazone 5 milliGRAM(s) Oral daily  predniSONE   Tablet 10 milliGRAM(s) Oral daily  sevelamer carbonate 800 milliGRAM(s) Oral three times a day with meals  sodium bicarbonate 650 milliGRAM(s) Oral three times a day    MEDICATIONS  (PRN):  acetaminophen     Tablet .. 650 milliGRAM(s) Oral every 6 hours PRN Temp greater or equal to 38C (100.4F), Mild Pain (1 - 3)  aluminum hydroxide/magnesium hydroxide/simethicone Suspension 30 milliLiter(s) Oral every 4 hours PRN Dyspepsia  dextrose Oral Gel 15 Gram(s) Oral once PRN Blood Glucose LESS THAN 70 milliGRAM(s)/deciliter  melatonin 3 milliGRAM(s) Oral at bedtime PRN Insomnia  ondansetron Injectable 4 milliGRAM(s) IV Push every 8 hours PRN Nausea and/or Vomiting        02-22-23 @ 07:01  -  02-23-23 @ 07:00  --------------------------------------------------------  IN: 450 mL / OUT: 4450 mL / NET: -4000 mL        PHYSICAL EXAM:  Vital Signs Last 24 Hrs  T(C): 36.4 (23 Feb 2023 04:28), Max: 36.9 (22 Feb 2023 12:08)  T(F): 97.6 (23 Feb 2023 04:28), Max: 98.4 (22 Feb 2023 12:08)  HR: 95 (23 Feb 2023 04:28) (95 - 96)  BP: 157/79 (23 Feb 2023 04:28) (155/80 - 160/75)  BP(mean): --  RR: 18 (23 Feb 2023 04:28) (18 - 18)  SpO2: 98% (23 Feb 2023 04:28) (98% - 100%)    Parameters below as of 23 Feb 2023 04:28  Patient On (Oxygen Delivery Method): room air        CAPILLARY BLOOD GLUCOSE      POCT Blood Glucose.: 319 mg/dL (22 Feb 2023 21:10)  POCT Blood Glucose.: 229 mg/dL (22 Feb 2023 17:11)  POCT Blood Glucose.: 178 mg/dL (22 Feb 2023 12:41)  POCT Blood Glucose.: 143 mg/dL (22 Feb 2023 08:45)    I&O's Summary    22 Feb 2023 07:01  -  23 Feb 2023 07:00  --------------------------------------------------------  IN: 450 mL / OUT: 4450 mL / NET: -4000 mL        CONSTITUTIONAL: NAD, well-developed  HEENT: NC/AT  RESPIRATORY: Normal respiratory effort; lungs are clear to auscultation bilaterally  CARDIOVASCULAR: Regular rate and rhythm, normal S1 and S2, no murmur/rub/gallop; No lower extremity edema; Peripheral pulses are 2+ bilaterally  ABDOMEN: Nontender to palpation, normoactive bowel sounds, no rebound/guarding; No hepatosplenomegaly  MUSCLOSKELETAL: no clubbing or cyanosis of digits; no joint swelling or tenderness to palpation  EXTREMITIES: 1+ b/l LE edema  NEURO: no focal deficits   PSYCH: A+O to person, place, and time; affect appropriate    LABS:                        7.3    7.99  )-----------( 275      ( 23 Feb 2023 06:50 )             22.7     02-23    137  |  97  |  121<H>  ----------------------------<  127<H>  4.3   |  20<L>  |  5.92<H>    Ca    8.2<L>      23 Feb 2023 06:50  Phos  9.6     02-23  Mg     2.2     02-23    TPro  6.1  /  Alb  3.3  /  TBili  0.2  /  DBili  x   /  AST  13  /  ALT  16  /  AlkPhos  39<L>  02-22              Culture - Blood (collected 21 Feb 2023 05:00)  Source: .Blood Blood-Peripheral  Preliminary Report (22 Feb 2023 12:01):    No growth to date.    Culture - Blood (collected 21 Feb 2023 04:50)  Source: .Blood Blood-Peripheral  Preliminary Report (22 Feb 2023 12:01):    No growth to date.    Culture - Urine (collected 20 Feb 2023 15:05)  Source: Clean Catch Clean Catch (Midstream)  Final Report (21 Feb 2023 15:00):    <10,000 CFU/mL Normal Urogenital Cassandra        IMAGING:    [X] All pertinent imaging reviewed by me

## 2023-02-23 NOTE — PROGRESS NOTE ADULT - PROBLEM SELECTOR PLAN 3
-pt w/ sCr 5.15 on presentation   -pt w/ baseline CKD stage 5 i/s/o uncontrolled DM  -during last hospitalization baseline sCr around 4.9  -worsening renal function possibly i/s/o volume overload   -U/A, FeUrea, urine lytes, spot TP/CR suggestive of intrinsic renal disease   -c/w sodium bicarb 650 TID   -c/w sevelamer 800 TID w/ meals   -possibility of HD requirement depending on progression of kidney function discussed w/ pt -pt w/ sCr 5.15 on presentation   -pt w/ baseline CKD stage 5 i/s/o uncontrolled DM  -during last hospitalization baseline sCr around 4.9  -worsening renal function possibly i/s/o volume overload   -U/A, FeUrea, urine lytes, spot TP/CR suggestive of intrinsic renal disease   -c/w sodium bicarb 650 TID   -c/w sevelamer 800 TID w/ meals   -possibility of HD requirement depending on progression of kidney function discussed w/ pt  -s/p EPO 2/22

## 2023-02-23 NOTE — PROGRESS NOTE ADULT - PROBLEM SELECTOR PLAN 1
Pt. with MIKALA on advanced CKD likely in setting of volume overload and progression of her underlying disease. On review of [facility] pt noted to have a SCr of 4.77 1/30/23 and trended to 5.88 today. Primary nephrologist is Dr. Jose M Garibay, last seen in office 2/15/23. CKD thought to be in setting of advanced diabetic nephropathy. Previously elevated proteinuria to 22.6g, now 12 grams; no hematuria. Labs reviewed, pt clinically volume overloaded. Off Bumex gtt would start Bumex 4mg TID.     No acute indication for RRT at this time, however explained to patient that should her renal function worsen or unable to improve her volume status then there is a chance she may require it. It would be beneficial for patient to get AVF inpatient. Will continue to discuss with patient. Pt. not a good candidate for PD at this time, perhaps in the future after better glycemic control.     Please obtain daily weights. Can give Metolazone 5mg Daily    Check TSAT and ferritin. Given Epo 6k units 2/22    Check blood gas. C/w NaHCO3 650mg TID    Check phos and calcium daily. PTH: 233(acceptable). C/w Sevelamer 800mg TID with meals.    Monitor labs and strict urine output. Avoid nephrotoxins. Dose medications as per eGFR.    If you have any questions, please feel free to contact me  Lito Oglesby  Nephrology Fellow  [phone]; Prefer [employer] TEAMS  (After 5pm or on weekends please page the on-call fellow)

## 2023-02-23 NOTE — PHYSICAL THERAPY INITIAL EVALUATION ADULT - PERTINENT HX OF CURRENT PROBLEM, REHAB EVAL
52 y.o. PMH CKD, HTN, sarcoidosis, DMII who presents w/ SOB and LE edema. Labs and VSS significant for leukocytosis, tachypnea and new O2 requirement and imaging consistent w/ b/l infiltrates suggestive of pulm edema vs infection. Pt admitted for AHRF likely i/s/o volume overload due to under diuresis i/s/o CKD vs CHF. 52 y.o. PMH CKD, HTN, sarcoidosis, DMII who presents w/ SOB and LE edema. Labs and VSS significant for leukocytosis, tachypnea and new O2 requirement and imaging consistent w/ b/l infiltrates suggestive of pulm edema vs infection. Pt admitted for AHRF likely i/s/o volume overload due to under diuresis i/s/o CKD vs CHF. **Of note, pt was recently admitted in Jan and received RW which she has been using. Prior to that admission, pt did not use a device.

## 2023-02-23 NOTE — PHYSICAL THERAPY INITIAL EVALUATION ADULT - ADDITIONAL COMMENTS
Pt lives in an apartment in a house with 5 steps to enter outside with + handrail and 12 steps inside with + handrail. Prior to last admission in January, pt ambulated without a device.

## 2023-02-23 NOTE — PHYSICAL THERAPY INITIAL EVALUATION ADULT - GENERAL OBSERVATIONS, REHAB EVAL
Pt received supine in bed in NAD, VSS, + UE IV (RN disconnected for PT), + 3 edema in feet and lower LEs.

## 2023-02-24 ENCOUNTER — APPOINTMENT (OUTPATIENT)
Dept: INTERNAL MEDICINE | Facility: CLINIC | Age: 53
End: 2023-02-24

## 2023-02-24 ENCOUNTER — TRANSCRIPTION ENCOUNTER (OUTPATIENT)
Age: 53
End: 2023-02-24

## 2023-02-24 LAB
ALBUMIN SERPL ELPH-MCNC: 3.3 G/DL — SIGNIFICANT CHANGE UP (ref 3.3–5)
ALP SERPL-CCNC: 52 U/L — SIGNIFICANT CHANGE UP (ref 40–120)
ALT FLD-CCNC: 19 U/L — SIGNIFICANT CHANGE UP (ref 10–45)
ANION GAP SERPL CALC-SCNC: 20 MMOL/L — HIGH (ref 5–17)
AST SERPL-CCNC: 13 U/L — SIGNIFICANT CHANGE UP (ref 10–40)
BILIRUB SERPL-MCNC: 0.1 MG/DL — LOW (ref 0.2–1.2)
BUN SERPL-MCNC: 116 MG/DL — HIGH (ref 7–23)
CALCIUM SERPL-MCNC: 7.9 MG/DL — LOW (ref 8.4–10.5)
CHLORIDE SERPL-SCNC: 94 MMOL/L — LOW (ref 96–108)
CO2 SERPL-SCNC: 22 MMOL/L — SIGNIFICANT CHANGE UP (ref 22–31)
CREAT SERPL-MCNC: 6.42 MG/DL — HIGH (ref 0.5–1.3)
EGFR: 7 ML/MIN/1.73M2 — LOW
GLUCOSE BLDC GLUCOMTR-MCNC: 195 MG/DL — HIGH (ref 70–99)
GLUCOSE BLDC GLUCOMTR-MCNC: 212 MG/DL — HIGH (ref 70–99)
GLUCOSE BLDC GLUCOMTR-MCNC: 215 MG/DL — HIGH (ref 70–99)
GLUCOSE BLDC GLUCOMTR-MCNC: 255 MG/DL — HIGH (ref 70–99)
GLUCOSE BLDC GLUCOMTR-MCNC: 317 MG/DL — HIGH (ref 70–99)
GLUCOSE SERPL-MCNC: 258 MG/DL — HIGH (ref 70–99)
HCT VFR BLD CALC: 21.6 % — LOW (ref 34.5–45)
HGB BLD-MCNC: 7.1 G/DL — LOW (ref 11.5–15.5)
MAGNESIUM SERPL-MCNC: 2.3 MG/DL — SIGNIFICANT CHANGE UP (ref 1.6–2.6)
MCHC RBC-ENTMCNC: 27.8 PG — SIGNIFICANT CHANGE UP (ref 27–34)
MCHC RBC-ENTMCNC: 32.9 GM/DL — SIGNIFICANT CHANGE UP (ref 32–36)
MCV RBC AUTO: 84.7 FL — SIGNIFICANT CHANGE UP (ref 80–100)
NRBC # BLD: 0 /100 WBCS — SIGNIFICANT CHANGE UP (ref 0–0)
PHOSPHATE SERPL-MCNC: 9.3 MG/DL — HIGH (ref 2.5–4.5)
PLATELET # BLD AUTO: 259 K/UL — SIGNIFICANT CHANGE UP (ref 150–400)
POTASSIUM SERPL-MCNC: 4.3 MMOL/L — SIGNIFICANT CHANGE UP (ref 3.5–5.3)
POTASSIUM SERPL-SCNC: 4.3 MMOL/L — SIGNIFICANT CHANGE UP (ref 3.5–5.3)
PROT SERPL-MCNC: 5.7 G/DL — LOW (ref 6–8.3)
RBC # BLD: 2.55 M/UL — LOW (ref 3.8–5.2)
RBC # FLD: 13.2 % — SIGNIFICANT CHANGE UP (ref 10.3–14.5)
SODIUM SERPL-SCNC: 136 MMOL/L — SIGNIFICANT CHANGE UP (ref 135–145)
WBC # BLD: 7.41 K/UL — SIGNIFICANT CHANGE UP (ref 3.8–10.5)
WBC # FLD AUTO: 7.41 K/UL — SIGNIFICANT CHANGE UP (ref 3.8–10.5)

## 2023-02-24 PROCEDURE — 99232 SBSQ HOSP IP/OBS MODERATE 35: CPT

## 2023-02-24 PROCEDURE — 99233 SBSQ HOSP IP/OBS HIGH 50: CPT | Mod: GC

## 2023-02-24 RX ORDER — IRON SUCROSE 20 MG/ML
200 INJECTION, SOLUTION INTRAVENOUS EVERY 24 HOURS
Refills: 0 | Status: DISCONTINUED | OUTPATIENT
Start: 2023-02-24 | End: 2023-02-25

## 2023-02-24 RX ORDER — TRAMADOL HYDROCHLORIDE 50 MG/1
1 TABLET ORAL
Qty: 0 | Refills: 0 | DISCHARGE

## 2023-02-24 RX ORDER — IRON SUCROSE 20 MG/ML
200 INJECTION, SOLUTION INTRAVENOUS EVERY 24 HOURS
Refills: 0 | Status: DISCONTINUED | OUTPATIENT
Start: 2023-02-24 | End: 2023-02-24

## 2023-02-24 RX ORDER — BUMETANIDE 0.25 MG/ML
4 INJECTION INTRAMUSCULAR; INTRAVENOUS
Refills: 0 | Status: DISCONTINUED | OUTPATIENT
Start: 2023-02-24 | End: 2023-02-25

## 2023-02-24 RX ORDER — SEVELAMER CARBONATE 2400 MG/1
1600 POWDER, FOR SUSPENSION ORAL
Refills: 0 | Status: DISCONTINUED | OUTPATIENT
Start: 2023-02-24 | End: 2023-02-25

## 2023-02-24 RX ORDER — SODIUM BICARBONATE 1 MEQ/ML
1 SYRINGE (ML) INTRAVENOUS
Qty: 90 | Refills: 0
Start: 2023-02-24 | End: 2023-03-25

## 2023-02-24 RX ORDER — BUMETANIDE 0.25 MG/ML
1 INJECTION INTRAMUSCULAR; INTRAVENOUS
Qty: 60 | Refills: 0
Start: 2023-02-24 | End: 2023-03-25

## 2023-02-24 RX ORDER — SEVELAMER CARBONATE 2400 MG/1
2 POWDER, FOR SUSPENSION ORAL
Qty: 180 | Refills: 0
Start: 2023-02-24 | End: 2023-03-25

## 2023-02-24 RX ADMIN — FAMOTIDINE 20 MILLIGRAM(S): 10 INJECTION INTRAVENOUS at 13:32

## 2023-02-24 RX ADMIN — Medication 1: at 22:00

## 2023-02-24 RX ADMIN — Medication 1 SPRAY(S): at 18:52

## 2023-02-24 RX ADMIN — SEVELAMER CARBONATE 800 MILLIGRAM(S): 2400 POWDER, FOR SUSPENSION ORAL at 09:31

## 2023-02-24 RX ADMIN — INSULIN GLARGINE 25 UNIT(S): 100 INJECTION, SOLUTION SUBCUTANEOUS at 22:00

## 2023-02-24 RX ADMIN — Medication 1 SPRAY(S): at 05:33

## 2023-02-24 RX ADMIN — BUMETANIDE 4 MILLIGRAM(S): 0.25 INJECTION INTRAMUSCULAR; INTRAVENOUS at 13:44

## 2023-02-24 RX ADMIN — Medication 2: at 13:31

## 2023-02-24 RX ADMIN — Medication 10 MILLIGRAM(S): at 05:32

## 2023-02-24 RX ADMIN — HEPARIN SODIUM 5000 UNIT(S): 5000 INJECTION INTRAVENOUS; SUBCUTANEOUS at 13:33

## 2023-02-24 RX ADMIN — Medication 650 MILLIGRAM(S): at 21:46

## 2023-02-24 RX ADMIN — CARVEDILOL PHOSPHATE 6.25 MILLIGRAM(S): 80 CAPSULE, EXTENDED RELEASE ORAL at 05:32

## 2023-02-24 RX ADMIN — Medication 7 UNIT(S): at 18:46

## 2023-02-24 RX ADMIN — Medication 650 MILLIGRAM(S): at 05:23

## 2023-02-24 RX ADMIN — Medication 650 MILLIGRAM(S): at 13:35

## 2023-02-24 RX ADMIN — BUMETANIDE 4 MILLIGRAM(S): 0.25 INJECTION INTRAMUSCULAR; INTRAVENOUS at 21:49

## 2023-02-24 RX ADMIN — Medication 4: at 09:29

## 2023-02-24 RX ADMIN — HEPARIN SODIUM 5000 UNIT(S): 5000 INJECTION INTRAVENOUS; SUBCUTANEOUS at 21:46

## 2023-02-24 RX ADMIN — Medication 650 MILLIGRAM(S): at 05:33

## 2023-02-24 RX ADMIN — CARVEDILOL PHOSPHATE 6.25 MILLIGRAM(S): 80 CAPSULE, EXTENDED RELEASE ORAL at 18:47

## 2023-02-24 RX ADMIN — ATORVASTATIN CALCIUM 80 MILLIGRAM(S): 80 TABLET, FILM COATED ORAL at 21:46

## 2023-02-24 RX ADMIN — Medication 100 MILLIGRAM(S): at 13:33

## 2023-02-24 RX ADMIN — Medication 100 MILLIGRAM(S): at 05:33

## 2023-02-24 RX ADMIN — Medication 650 MILLIGRAM(S): at 04:23

## 2023-02-24 RX ADMIN — HEPARIN SODIUM 5000 UNIT(S): 5000 INJECTION INTRAVENOUS; SUBCUTANEOUS at 05:32

## 2023-02-24 RX ADMIN — Medication 2: at 18:45

## 2023-02-24 RX ADMIN — Medication 7 UNIT(S): at 09:30

## 2023-02-24 RX ADMIN — AMLODIPINE BESYLATE 10 MILLIGRAM(S): 2.5 TABLET ORAL at 05:32

## 2023-02-24 RX ADMIN — BUMETANIDE 132 MILLIGRAM(S): 0.25 INJECTION INTRAMUSCULAR; INTRAVENOUS at 05:31

## 2023-02-24 RX ADMIN — Medication 7 UNIT(S): at 13:31

## 2023-02-24 RX ADMIN — Medication 100 MILLIGRAM(S): at 21:45

## 2023-02-24 RX ADMIN — IRON SUCROSE 110 MILLIGRAM(S): 20 INJECTION, SOLUTION INTRAVENOUS at 18:52

## 2023-02-24 NOTE — DISCHARGE NOTE PROVIDER - PROVIDER TOKENS
PROVIDER:[TOKEN:[[id number]],FOLLOWUP:[Routine]],PROVIDER:[TOKEN:[[id number]],FOLLOWUP:[Routine],ESTABLISHEDPATIENT:[T]]

## 2023-02-24 NOTE — PROGRESS NOTE ADULT - PROBLEM SELECTOR PLAN 1
-pt w/ SIRS criteria w/ tachypnea and leukocytosis to 15 on admission w/ new O2 requirement   -CXR w/ b/l infiltrates suspicious for pulmonary edema vs infection   -pt w/ lactate wnl   -s/p cefepime and azithro in ED   -will hold abx given low suspicion for inf   -f/u blood cx   -likely i/s/o volume overload rather than infection   -pt s/p bumex 4 IV  -diuresis as below -pt w/ SIRS criteria w/ tachypnea and leukocytosis to 15 on admission w/ new O2 requirement   -CXR w/ b/l infiltrates suspicious for pulmonary edema vs infection   -pt w/ lactate wnl   -s/p cefepime and azithro in ED   -will hold abx given low suspicion for inf   -blood cx NGTD  -likely i/s/o volume overload rather than infection   -pt s/p bumex 4 IV  -diuresis as below

## 2023-02-24 NOTE — PROGRESS NOTE ADULT - SUBJECTIVE AND OBJECTIVE BOX
[facility] DIVISION OF KIDNEY DISEASES AND HYPERTENSION -- FOLLOW UP NOTE  --------------------------------------------------------------------------------  HPI:  Patient is a 52 year old female with PMH of Sarcoidosis, DM2, HTN and CKD stage 5 who presents to the hospital with worsening LE edema and shortness of breath. The nephrology team was consulted for advanced kidney disease and volume overload. On review of [facility] pt noted to have a SCr of 4.77 1/30/23 and on arrival today elevated to 5.15. Of note the patient was recently admitted to [facility] after presenting with LE pain and shortness of breath as well. The patient follows with Dr Jose M Garibay as her primary nephrologist, last seen in the office 2/15/23. At that time she was advised to increase her diuretics to Torsemide 20mg daily and states she has been adherent. However over the course of the past few days she noticed worsening edema and shortness of breath. She states the breathing worsens when she lays down flat. She admits she has been urinating well without issues. She denied any chest pain, nausea, vomiting, abdominal pain, change in appetite or taste.      Pt. seen this AM. Now off O2. Swelling decreased. Off Bumex gtt. Tolerating intermittent diuretics.       PAST HISTORY  --------------------------------------------------------------------------------  No significant changes to PMH, PSH, FHx, SHx, unless otherwise noted    ALLERGIES & MEDICATIONS  --------------------------------------------------------------------------------  Allergies    No Known Allergies    Intolerances      Standing Inpatient Medications  amLODIPine   Tablet 10 milliGRAM(s) Oral daily  atorvastatin 80 milliGRAM(s) Oral at bedtime  buMETAnide IVPB 4 milliGRAM(s) IV Intermittent every 8 hours  carvedilol 6.25 milliGRAM(s) Oral every 12 hours  dextrose 5%. 1000 milliLiter(s) IV Continuous <Continuous>  dextrose 5%. 1000 milliLiter(s) IV Continuous <Continuous>  dextrose 50% Injectable 25 Gram(s) IV Push once  dextrose 50% Injectable 12.5 Gram(s) IV Push once  dextrose 50% Injectable 25 Gram(s) IV Push once  epoetin suzette-epbx (RETACRIT) Injectable 6000 Unit(s) SubCutaneous once  famotidine    Tablet 20 milliGRAM(s) Oral daily  fluticasone propionate 50 MICROgram(s)/spray Nasal Spray 1 Spray(s) Both Nostrils two times a day  glucagon  Injectable 1 milliGRAM(s) IntraMuscular once  heparin   Injectable 5000 Unit(s) SubCutaneous every 8 hours  hydrALAZINE 100 milliGRAM(s) Oral three times a day  influenza   Vaccine 0.5 milliLiter(s) IntraMuscular once  insulin glargine Injectable (LANTUS) 25 Unit(s) SubCutaneous at bedtime  insulin lispro (ADMELOG) corrective regimen sliding scale   SubCutaneous three times a day before meals  insulin lispro (ADMELOG) corrective regimen sliding scale   SubCutaneous at bedtime  insulin lispro Injectable (ADMELOG) 7 Unit(s) SubCutaneous three times a day before meals  metolazone 5 milliGRAM(s) Oral daily  predniSONE   Tablet 10 milliGRAM(s) Oral daily  sevelamer carbonate 800 milliGRAM(s) Oral three times a day with meals  sodium bicarbonate 650 milliGRAM(s) Oral three times a day    PRN Inpatient Medications  acetaminophen     Tablet .. 650 milliGRAM(s) Oral every 6 hours PRN  aluminum hydroxide/magnesium hydroxide/simethicone Suspension 30 milliLiter(s) Oral every 4 hours PRN  dextrose Oral Gel 15 Gram(s) Oral once PRN  melatonin 3 milliGRAM(s) Oral at bedtime PRN  ondansetron Injectable 4 milliGRAM(s) IV Push every 8 hours PRN      REVIEW OF SYSTEMS  --------------------------------------------------------------------------------  As per HPI    VITALS/PHYSICAL EXAM  --------------------------------------------------------------------------------  T(C): 37.3 (02-24-23 @ 05:10), Max: 37.3 (02-24-23 @ 05:10)  HR: 105 (02-24-23 @ 05:10) (93 - 105)  BP: 135/68 (02-24-23 @ 05:10) (135/68 - 154/83)  RR: 18 (02-24-23 @ 05:10) (18 - 18)  SpO2: 98% (02-24-23 @ 05:10) (97% - 98%)  Wt(kg): --        02-23-23 @ 07:01  -  02-24-23 @ 07:00  --------------------------------------------------------  IN: 340 mL / OUT: 2775 mL / NET: -2435 mL      Physical Exam:  	Gen: NAD  	HEENT: MMM  	Pulm: CTA B/L  	CV: S1S2  	Abd: Soft, +BS   	Ext: No LE edema B/L  	Neuro: Awake  	Skin: Warm and dry  	Vascular access:      LABS/STUDIES  --------------------------------------------------------------------------------              7.1    7.41  >-----------<  259      [02-24-23 @ 07:05]              21.6     136  |  94  |  116  ----------------------------<  258      [02-24-23 @ 07:06]  4.3   |  22  |  6.42        Ca     7.9     [02-24-23 @ 07:06]      Mg     2.3     [02-24-23 @ 07:06]      Phos  9.3     [02-24-23 @ 07:06]    TPro  5.7  /  Alb  3.3  /  TBili  0.1  /  DBili  x   /  AST  13  /  ALT  19  /  AlkPhos  52  [02-24-23 @ 07:06]      Creatinine Trend:  SCr 6.42 [02-24 @ 07:06]  SCr 5.92 [02-23 @ 16:29]  SCr 5.92 [02-23 @ 06:50]  SCr 5.93 [02-23 @ 01:02]  SCr 5.94 [02-22 @ 17:33]    Urinalysis - [02-20-23 @ 15:05]      Color Colorless / Appearance Clear / SG 1.010 / pH 6.5      Gluc 200 mg/dL / Ketone Negative  / Bili Negative / Urobili Negative       Blood Negative / Protein 300 mg/dL / Leuk Est Negative / Nitrite Negative      RBC 2 / WBC 2 / Hyaline 1 / Gran  / Sq Epi  / Non Sq Epi 2 / Bacteria Negative    Urine Creatinine 17      [02-22-23 @ 07:15]  Urine Protein 206      [02-22-23 @ 07:15]  Urine Sodium 100      [02-20-23 @ 22:52]  Urine Urea Nitrogen 237      [02-20-23 @ 22:52]  Urine Potassium 26      [02-20-23 @ 22:52]  Urine Osmolality 339      [02-20-23 @ 22:52]    Iron 33, TIBC 280, %sat 12      [02-23-23 @ 06:50]  Ferritin 22      [02-23-23 @ 06:50]  PTH -- (Ca 8.1)      [02-23-23 @ 06:50]   218  PTH -- (Ca 8.0)      [02-22-23 @ 17:33]   233  PTH -- (Ca 8.1)      [01-31-23 @ 12:34]   98  Vitamin D (25OH) 7.6      [01-27-23 @ 18:12]  HbA1c 11.2      [08-09-19 @ 21:20]  TSH 1.71      [01-28-23 @ 07:29]  Lipid: chol 254, , , LDL --      [02-21-23 @ 07:58]      JOSLYN: titer Negative, pattern --      [01-28-23 @ 07:29]  dsDNA <12      [01-28-23 @ 07:29]

## 2023-02-24 NOTE — DISCHARGE NOTE PROVIDER - NSDCFUSCHEDAPPT_GEN_ALL_CORE_FT
[facility] Physician [facility]  INTMED  [facility]   Scheduled Appointment: 02/24/2023    Cheri Corona  [facility]  ENDOCRIN [address] [facility]  Scheduled Appointment: 03/03/2023    Jose M Garibay  [facility]  NEPHRO OP [id number] [facility]  Scheduled Appointment: 05/10/2023     Cheri Corona  [facility]  ENDOCRIN [address] [facility]  Scheduled Appointment: 03/03/2023    Jose M Garibay  [facility] Physician [facility]  NEPHRO OP [id number] [facility]  Scheduled Appointment: 05/10/2023     Gabriela Ramirez  [facility] Physician [facility]  INTMED  [facility]   Scheduled Appointment: 03/01/2023    Cheri Corona  [facility]  ENDOCRIN [address] [facility]  Scheduled Appointment: 03/03/2023    Jose M Garibay  [facility]  NEPHRO OP [id number] [facility]  Scheduled Appointment: 04/26/2023    Jose M Garibay  [facility]  NEPHRO OP [id number] [facility]  Scheduled Appointment: 05/10/2023

## 2023-02-24 NOTE — DISCHARGE NOTE PROVIDER - HOSPITAL COURSE
52F PMH CKD presented w/ SOB and LE edema 2/2 missing torsemide due to recent MKIALA at previous admission.    While in hospital pt received bumex gtt and then transitioned to PO. Tolerating bumex PO well and still deciding regarding the decision for dialysis. She will follow up with nephro regarding dialysis decision (PD vs HD) and endocrine for better sugar control. 52F PMH CKD presented w/ SOB and LE edema 2/2 missing torsemide due to recent MIKALA at previous admission.    While in hospital pt received bumex gtt and then transitioned to PO. Tolerating bumex PO well and still deciding regarding the decision for dialysis. She is leaning towards peritoneal dialysis and will follow up with nephrology outpatient to further discuss. She will see endocrine for better sugar control to optimize for possible PD.

## 2023-02-24 NOTE — PROGRESS NOTE ADULT - SUBJECTIVE AND OBJECTIVE BOX
Ambrose Moran MD   Internal Medicine, PGY 1  Contact via TEAMS.     SUBJECTIVE / OVERNIGHT EVENTS:  - Pt seen and examined at bedside  - NADER    MEDICATIONS  (STANDING):  amLODIPine   Tablet 10 milliGRAM(s) Oral daily  atorvastatin 80 milliGRAM(s) Oral at bedtime  buMETAnide IVPB 4 milliGRAM(s) IV Intermittent every 8 hours  carvedilol 6.25 milliGRAM(s) Oral every 12 hours  dextrose 5%. 1000 milliLiter(s) (50 mL/Hr) IV Continuous <Continuous>  dextrose 5%. 1000 milliLiter(s) (100 mL/Hr) IV Continuous <Continuous>  dextrose 50% Injectable 25 Gram(s) IV Push once  dextrose 50% Injectable 12.5 Gram(s) IV Push once  dextrose 50% Injectable 25 Gram(s) IV Push once  epoetin suzette-epbx (RETACRIT) Injectable 6000 Unit(s) SubCutaneous once  famotidine    Tablet 20 milliGRAM(s) Oral daily  fluticasone propionate 50 MICROgram(s)/spray Nasal Spray 1 Spray(s) Both Nostrils two times a day  glucagon  Injectable 1 milliGRAM(s) IntraMuscular once  heparin   Injectable 5000 Unit(s) SubCutaneous every 8 hours  hydrALAZINE 100 milliGRAM(s) Oral three times a day  influenza   Vaccine 0.5 milliLiter(s) IntraMuscular once  insulin glargine Injectable (LANTUS) 25 Unit(s) SubCutaneous at bedtime  insulin lispro (ADMELOG) corrective regimen sliding scale   SubCutaneous three times a day before meals  insulin lispro (ADMELOG) corrective regimen sliding scale   SubCutaneous at bedtime  insulin lispro Injectable (ADMELOG) 7 Unit(s) SubCutaneous three times a day before meals  metolazone 5 milliGRAM(s) Oral daily  predniSONE   Tablet 10 milliGRAM(s) Oral daily  sevelamer carbonate 800 milliGRAM(s) Oral three times a day with meals  sodium bicarbonate 650 milliGRAM(s) Oral three times a day    MEDICATIONS  (PRN):  acetaminophen     Tablet .. 650 milliGRAM(s) Oral every 6 hours PRN Temp greater or equal to 38C (100.4F), Mild Pain (1 - 3)  aluminum hydroxide/magnesium hydroxide/simethicone Suspension 30 milliLiter(s) Oral every 4 hours PRN Dyspepsia  dextrose Oral Gel 15 Gram(s) Oral once PRN Blood Glucose LESS THAN 70 milliGRAM(s)/deciliter  melatonin 3 milliGRAM(s) Oral at bedtime PRN Insomnia  ondansetron Injectable 4 milliGRAM(s) IV Push every 8 hours PRN Nausea and/or Vomiting        02-22-23 @ 07:01  -  02-23-23 @ 07:00  --------------------------------------------------------  IN: 450 mL / OUT: 4450 mL / NET: -4000 mL    02-23-23 @ 07:01  -  02-24-23 @ 06:58  --------------------------------------------------------  IN: 340 mL / OUT: 2775 mL / NET: -2435 mL        PHYSICAL EXAM:  Vital Signs Last 24 Hrs  T(C): 37.3 (24 Feb 2023 05:10), Max: 37.3 (24 Feb 2023 05:10)  T(F): 99.2 (24 Feb 2023 05:10), Max: 99.2 (24 Feb 2023 05:10)  HR: 105 (24 Feb 2023 05:10) (93 - 105)  BP: 135/68 (24 Feb 2023 05:10) (135/68 - 154/83)  BP(mean): --  RR: 18 (24 Feb 2023 05:10) (18 - 18)  SpO2: 98% (24 Feb 2023 05:10) (97% - 98%)    Parameters below as of 24 Feb 2023 05:10  Patient On (Oxygen Delivery Method): room air        CAPILLARY BLOOD GLUCOSE      POCT Blood Glucose.: 122 mg/dL (23 Feb 2023 20:41)  POCT Blood Glucose.: 154 mg/dL (23 Feb 2023 18:05)  POCT Blood Glucose.: 235 mg/dL (23 Feb 2023 12:07)  POCT Blood Glucose.: 98 mg/dL (23 Feb 2023 09:23)    I&O's Summary    22 Feb 2023 07:01  -  23 Feb 2023 07:00  --------------------------------------------------------  IN: 450 mL / OUT: 4450 mL / NET: -4000 mL    23 Feb 2023 07:01  -  24 Feb 2023 06:58  --------------------------------------------------------  IN: 340 mL / OUT: 2775 mL / NET: -2435 mL        CONSTITUTIONAL: NAD, well-developed  RESPIRATORY: Normal respiratory effort; lungs are clear to auscultation bilaterally  CARDIOVASCULAR: Regular rate and rhythm, normal S1 and S2, no murmur/rub/gallop; No lower extremity edema; Peripheral pulses are 2+ bilaterally  ABDOMEN: Nontender to palpation, normoactive bowel sounds, no rebound/guarding; No hepatosplenomegaly  MUSCLOSKELETAL: no clubbing or cyanosis of digits; no joint swelling or tenderness to palpation  PSYCH: A+O to person, place, and time; affect appropriate    LABS:                        7.3    7.99  )-----------( 275      ( 23 Feb 2023 06:50 )             22.7     02-23    135  |  94<L>  |  119<H>  ----------------------------<  152<H>  5.3   |  22  |  5.92<H>    Ca    8.4      23 Feb 2023 16:29  Phos  9.7     02-23  Mg     2.3     02-23    TPro  6.1  /  Alb  3.3  /  TBili  0.2  /  DBili  x   /  AST  13  /  ALT  16  /  AlkPhos  39<L>  02-22                IMAGING:    [X] All pertinent imaging reviewed by me Ambrose Moran MD   Internal Medicine, PGY 1  Contact via TEAMS.     SUBJECTIVE / OVERNIGHT EVENTS:  - Pt seen and examined at bedside  - NADER    MEDICATIONS  (STANDING):  amLODIPine   Tablet 10 milliGRAM(s) Oral daily  atorvastatin 80 milliGRAM(s) Oral at bedtime  buMETAnide IVPB 4 milliGRAM(s) IV Intermittent every 8 hours  carvedilol 6.25 milliGRAM(s) Oral every 12 hours  dextrose 5%. 1000 milliLiter(s) (50 mL/Hr) IV Continuous <Continuous>  dextrose 5%. 1000 milliLiter(s) (100 mL/Hr) IV Continuous <Continuous>  dextrose 50% Injectable 25 Gram(s) IV Push once  dextrose 50% Injectable 12.5 Gram(s) IV Push once  dextrose 50% Injectable 25 Gram(s) IV Push once  epoetin suzette-epbx (RETACRIT) Injectable 6000 Unit(s) SubCutaneous once  famotidine    Tablet 20 milliGRAM(s) Oral daily  fluticasone propionate 50 MICROgram(s)/spray Nasal Spray 1 Spray(s) Both Nostrils two times a day  glucagon  Injectable 1 milliGRAM(s) IntraMuscular once  heparin   Injectable 5000 Unit(s) SubCutaneous every 8 hours  hydrALAZINE 100 milliGRAM(s) Oral three times a day  influenza   Vaccine 0.5 milliLiter(s) IntraMuscular once  insulin glargine Injectable (LANTUS) 25 Unit(s) SubCutaneous at bedtime  insulin lispro (ADMELOG) corrective regimen sliding scale   SubCutaneous three times a day before meals  insulin lispro (ADMELOG) corrective regimen sliding scale   SubCutaneous at bedtime  insulin lispro Injectable (ADMELOG) 7 Unit(s) SubCutaneous three times a day before meals  metolazone 5 milliGRAM(s) Oral daily  predniSONE   Tablet 10 milliGRAM(s) Oral daily  sevelamer carbonate 800 milliGRAM(s) Oral three times a day with meals  sodium bicarbonate 650 milliGRAM(s) Oral three times a day    MEDICATIONS  (PRN):  acetaminophen     Tablet .. 650 milliGRAM(s) Oral every 6 hours PRN Temp greater or equal to 38C (100.4F), Mild Pain (1 - 3)  aluminum hydroxide/magnesium hydroxide/simethicone Suspension 30 milliLiter(s) Oral every 4 hours PRN Dyspepsia  dextrose Oral Gel 15 Gram(s) Oral once PRN Blood Glucose LESS THAN 70 milliGRAM(s)/deciliter  melatonin 3 milliGRAM(s) Oral at bedtime PRN Insomnia  ondansetron Injectable 4 milliGRAM(s) IV Push every 8 hours PRN Nausea and/or Vomiting        02-22-23 @ 07:01  -  02-23-23 @ 07:00  --------------------------------------------------------  IN: 450 mL / OUT: 4450 mL / NET: -4000 mL    02-23-23 @ 07:01  -  02-24-23 @ 06:58  --------------------------------------------------------  IN: 340 mL / OUT: 2775 mL / NET: -2435 mL        PHYSICAL EXAM:  Vital Signs Last 24 Hrs  T(C): 37.3 (24 Feb 2023 05:10), Max: 37.3 (24 Feb 2023 05:10)  T(F): 99.2 (24 Feb 2023 05:10), Max: 99.2 (24 Feb 2023 05:10)  HR: 105 (24 Feb 2023 05:10) (93 - 105)  BP: 135/68 (24 Feb 2023 05:10) (135/68 - 154/83)  BP(mean): --  RR: 18 (24 Feb 2023 05:10) (18 - 18)  SpO2: 98% (24 Feb 2023 05:10) (97% - 98%)    Parameters below as of 24 Feb 2023 05:10  Patient On (Oxygen Delivery Method): room air        CAPILLARY BLOOD GLUCOSE      POCT Blood Glucose.: 122 mg/dL (23 Feb 2023 20:41)  POCT Blood Glucose.: 154 mg/dL (23 Feb 2023 18:05)  POCT Blood Glucose.: 235 mg/dL (23 Feb 2023 12:07)  POCT Blood Glucose.: 98 mg/dL (23 Feb 2023 09:23)    I&O's Summary    22 Feb 2023 07:01  -  23 Feb 2023 07:00  --------------------------------------------------------  IN: 450 mL / OUT: 4450 mL / NET: -4000 mL    23 Feb 2023 07:01  -  24 Feb 2023 06:58  --------------------------------------------------------  IN: 340 mL / OUT: 2775 mL / NET: -2435 mL        CONSTITUTIONAL: NAD   HEENT: NC/AT  RESPIRATORY: CTABL  CARDIOVASCULAR: Regular rate and rhythm, normal S1 and S2, no murmur/rub/gallop; No lower extremity edema; Peripheral pulses are 2+ bilaterally  ABDOMEN: Nontender to palpation, normoactive bowel sounds, no rebound/guarding; No hepatosplenomegaly  MUSCLOSKELETAL: no clubbing or cyanosis of digits; no joint swelling or tenderness to palpation  EXTREMITIES: 1+ LE edema  NEURO: no focal deficits   PSYCH: A+O to person, place, and time; affect appropriate    LABS:                        7.3    7.99  )-----------( 275      ( 23 Feb 2023 06:50 )             22.7     02-23    135  |  94<L>  |  119<H>  ----------------------------<  152<H>  5.3   |  22  |  5.92<H>    Ca    8.4      23 Feb 2023 16:29  Phos  9.7     02-23  Mg     2.3     02-23    TPro  6.1  /  Alb  3.3  /  TBili  0.2  /  DBili  x   /  AST  13  /  ALT  16  /  AlkPhos  39<L>  02-22                IMAGING:    [X] All pertinent imaging reviewed by me

## 2023-02-24 NOTE — DISCHARGE NOTE PROVIDER - NSDCMRMEDTOKEN_GEN_ALL_CORE_FT
acetaminophen 325 mg oral tablet: 2 tab(s) orally every 6 hours, As needed, Temp greater or equal to 38C (100.4F), Mild Pain (1 - 3), Moderate Pain (4 - 6)  albuterol 90 mcg/inh inhalation aerosol: 2 puff(s) inhaled every 4 hours, As Needed  amLODIPine 10 mg oral tablet: 1 tab(s) orally once a day  Basaglar KwikPen 100 units/mL subcutaneous solution: 36 unit(s) subcutaneous once a day (at bedtime)  carvedilol 12.5 mg oral tablet: 1 tab(s) orally once a day   diclofenac 1% topical gel: Apply topically to affected area 3 times a day, As Needed  dulaglutide 1.5 mg/0.5 mL subcutaneous solution: 1.5 milligram(s) subcutaneously every 7 days   famotidine 20 mg oral tablet: 1 tab(s) orally once a day  fluticasone 50 mcg/inh nasal spray: 1 spray(s) nasal 2 times a day  Freestyle Charlie 2 [place]: Dispense 1 [place]  gabapentin 100 mg oral capsule: 1 cap(s) orally 2 times a day   glucometer (per patient&#x27;s insurance): Test blood sugars four times a day. Dispense #1 glucometer.  hydrALAZINE 100 mg oral tablet: 1 tab(s) orally 3 times a day  Insulin Pen Needles, 4mm: 1 application subcutaneously 4 times a day. ** Use with insulin pen **   lancets: 1 application subcutaneously 4 times a day   NovoLOG 100 units/mL injectable solution: 10 unit(s) injectable 3 times a day   predniSONE 10 mg oral tablet: 1 tab(s) orally once a day  rosuvastatin 40 mg oral tablet: 1 tab(s) orally once a day  test strips (per patient&#x27;s insurance): 1 application subcutaneously 4 times a day. ** Compatible with patient&#x27;s glucometer **  traMADol 50 mg oral tablet: 1 tab(s) orally every 6 hours, As Needed   albuterol 90 mcg/inh inhalation aerosol: 2 puff(s) inhaled every 4 hours, As Needed  amLODIPine 10 mg oral tablet: 1 tab(s) orally once a day  Basaglar KwikPen 100 units/mL subcutaneous solution: 36 unit(s) subcutaneous once a day (at bedtime)  carvedilol 12.5 mg oral tablet: 1 tab(s) orally once a day   diclofenac 1% topical gel: Apply topically to affected area 3 times a day, As Needed  dulaglutide 1.5 mg/0.5 mL subcutaneous solution: 1.5 milligram(s) subcutaneously every 7 days   famotidine 20 mg oral tablet: 1 tab(s) orally once a day  fluticasone 50 mcg/inh nasal spray: 1 spray(s) nasal 2 times a day  Freestyle Charlie 2 [place]: Dispense 1 [place]  gabapentin 100 mg oral capsule: 1 cap(s) orally 2 times a day   glucometer (per patient&#x27;s insurance): Test blood sugars four times a day. Dispense #1 glucometer.  hydrALAZINE 100 mg oral tablet: 1 tab(s) orally 3 times a day  Insulin Pen Needles, 4mm: 1 application subcutaneously 4 times a day. ** Use with insulin pen **   lancets: 1 application subcutaneously 4 times a day   NovoLOG 100 units/mL injectable solution: 10 unit(s) injectable 3 times a day   predniSONE 10 mg oral tablet: 1 tab(s) orally once a day  rosuvastatin 40 mg oral tablet: 1 tab(s) orally once a day  test strips (per patient&#x27;s insurance): 1 application subcutaneously 4 times a day. ** Compatible with patient&#x27;s glucometer **   albuterol 90 mcg/inh inhalation aerosol: 2 puff(s) inhaled every 4 hours, As Needed  amLODIPine 10 mg oral tablet: 1 tab(s) orally once a day  Basaglar KwikPen 100 units/mL subcutaneous solution: 36 unit(s) subcutaneous once a day (at bedtime)  bumetanide 2 mg oral tablet: 1 tab(s) orally 2 times a day   carvedilol 12.5 mg oral tablet: 1 tab(s) orally once a day   diclofenac 1% topical gel: Apply topically to affected area 3 times a day, As Needed  dulaglutide 1.5 mg/0.5 mL subcutaneous solution: 1.5 milligram(s) subcutaneously every 7 days   famotidine 20 mg oral tablet: 1 tab(s) orally once a day  ferrous sulfate 324 mg (65 mg elemental iron) oral delayed release tablet: 1 tab(s) orally once a day   fluticasone 50 mcg/inh nasal spray: 1 spray(s) nasal 2 times a day  Freestyle Charlie 2 [place]: Dispense 1 [place]  gabapentin 100 mg oral capsule: 1 cap(s) orally 2 times a day   glucometer (per patient&#x27;s insurance): Test blood sugars four times a day. Dispense #1 glucometer.  hydrALAZINE 100 mg oral tablet: 1 tab(s) orally 3 times a day  Insulin Pen Needles, 4mm: 1 application subcutaneously 4 times a day. ** Use with insulin pen **   lancets: 1 application subcutaneously 4 times a day   NovoLOG 100 units/mL injectable solution: 10 unit(s) injectable 3 times a day   predniSONE 10 mg oral tablet: 1 tab(s) orally once a day  rosuvastatin 40 mg oral tablet: 1 tab(s) orally once a day  sevelamer carbonate 800 mg oral tablet: 2 tab(s) orally 3 times a day (with meals)  sodium bicarbonate 650 mg oral tablet: 1 tab(s) orally 3 times a day  test strips (per patient&#x27;s insurance): 1 application subcutaneously 4 times a day. ** Compatible with patient&#x27;s glucometer **   3 in 1 Bedside Commode: 3 in 1 Bedside Commode  ICD10: J81, D86.9  albuterol 90 mcg/inh inhalation aerosol: 2 puff(s) inhaled every 4 hours, As Needed  amLODIPine 10 mg oral tablet: 1 tab(s) orally once a day  Basaglar KwikPen 100 units/mL subcutaneous solution: 36 unit(s) subcutaneous once a day (at bedtime)  bumetanide 2 mg oral tablet: 1 tab(s) orally 2 times a day   carvedilol 12.5 mg oral tablet: 1 tab(s) orally once a day   diclofenac 1% topical gel: Apply topically to affected area 3 times a day, As Needed  dulaglutide 1.5 mg/0.5 mL subcutaneous solution: 1.5 milligram(s) subcutaneously every 7 days   famotidine 20 mg oral tablet: 1 tab(s) orally once a day  ferrous sulfate 324 mg (65 mg elemental iron) oral delayed release tablet: 1 tab(s) orally once a day   fluticasone 50 mcg/inh nasal spray: 1 spray(s) nasal 2 times a day  Freestyle Charlie 2 [place]: Dispense 1 [place]  gabapentin 100 mg oral capsule: 1 cap(s) orally 2 times a day   glucometer (per patient&#x27;s insurance): Test blood sugars four times a day. Dispense #1 glucometer.  hydrALAZINE 100 mg oral tablet: 1 tab(s) orally 3 times a day  Insulin Pen Needles, 4mm: 1 application subcutaneously 4 times a day. ** Use with insulin pen **   lancets: 1 application subcutaneously 4 times a day   NovoLOG 100 units/mL injectable solution: 10 unit(s) injectable 3 times a day   predniSONE 10 mg oral tablet: 1 tab(s) orally once a day  rosuvastatin 40 mg oral tablet: 1 tab(s) orally once a day  sevelamer carbonate 800 mg oral tablet: 2 tab(s) orally 3 times a day (with meals)  sodium bicarbonate 650 mg oral tablet: 1 tab(s) orally 3 times a day  test strips (per patient&#x27;s insurance): 1 application subcutaneously 4 times a day. ** Compatible with patient&#x27;s glucometer **

## 2023-02-24 NOTE — DISCHARGE NOTE PROVIDER - NSDCCPCAREPLAN_GEN_ALL_CORE_FT
PRINCIPAL DISCHARGE DIAGNOSIS  Diagnosis: Fluid overload  Assessment and Plan of Treatment: You had fluid overload and difficulty breathing because your diuretic regimen was stopped. We resumed your regimen and you improved. We discharged you on _______ regimen. Please follow up with your nephrologist regarding the decision for dialysis as it will remove more fluids. Please follow up with endocrine doctor for better sugar control as well.       PRINCIPAL DISCHARGE DIAGNOSIS  Diagnosis: Fluid overload  Assessment and Plan of Treatment: You had fluid overload and difficulty breathing because your diuretic regimen was stopped following your prior discharge from the hospital. We adjusted your regimen of diuretic medications and your breathing and lower extremity edema improved. We discharged you on oral bumex regimen. Please follow up with your nephrologist regarding the decision for dialysis as it will remove more fluids and help to filter your blood. Please follow up with endocrine doctor for better sugar control as well.       PRINCIPAL DISCHARGE DIAGNOSIS  Diagnosis: Fluid overload  Assessment and Plan of Treatment: You had fluid overload and difficulty breathing because your diuretic regimen was stopped following your prior discharge from the hospital. We adjusted your regimen of diuretic medications and your breathing and lower extremity edema improved. We discharged you on oral bumex regimen. Please follow up with your nephrologist regarding the decision for dialysis as it will remove more fluids and help to filter your blood. Please follow up with endocrine doctor for better sugar control as well. Please get bloodwork within a week of discharge in consultation with your nephrologist or kidney doctor.

## 2023-02-24 NOTE — DISCHARGE NOTE PROVIDER - CARE PROVIDERS DIRECT ADDRESSES
,DirectAddress_Unknown,[email or web address]

## 2023-02-24 NOTE — PROGRESS NOTE ADULT - PROBLEM SELECTOR PLAN 2
-pt endorsing worsening LE edema and SOB since being d/c from hospital off of home torsemide   -pt w/ b/l infiltrates on CXR suspicious for pulmonary edema   -PE w/ crackles b/l   -volume overload due to under diuresis i/s/o CKD vs CHF  -pt s/p bumex 4 IV in ED   -nephro c/s; appreciate recs   -start bumex gtt 2cc/hr, goal urine o/p 1-2L/day, net negative 2.7L   -strict Is/Os  -daily weights   -POCUS w/ grossly intact cardiac function   -proBNP elevated at 7900  -TTE w/ no systolic dysfunction, likely fluid overload i/s/o CKD -pt endorsing worsening LE edema and SOB since being d/c from hospital off of home torsemide   -pt w/ b/l infiltrates on CXR suspicious for pulmonary edema   -PE w/ crackles b/l   -volume overload due to under diuresis i/s/o CKD vs CHF  -pt s/p bumex 4 IV in ED   -nephro c/s; appreciate recs   -transitioned to bumex 4 IV TID  -strict Is/Os  -daily weights   -POCUS w/ grossly intact cardiac function   -proBNP elevated at 7900  -TTE w/ no systolic dysfunction, likely fluid overload i/s/o CKD -pt endorsing worsening LE edema and SOB since being d/c from hospital off of home torsemide   -pt w/ b/l infiltrates on CXR suspicious for pulmonary edema   -PE w/ crackles b/l   -volume overload due to under diuresis i/s/o CKD vs CHF  -pt s/p bumex 4 IV in ED   -nephro c/s; appreciate recs   -transitioned to bumex 4 PO TID  -strict Is/Os  -daily weights   -POCUS w/ grossly intact cardiac function   -proBNP elevated at 7900  -TTE w/ no systolic dysfunction, likely fluid overload i/s/o CKD

## 2023-02-24 NOTE — PROGRESS NOTE ADULT - PROBLEM SELECTOR PLAN 1
Pt. with MIKALA on advanced CKD likely in setting of volume overload and progression of her underlying disease. [facility] noted to have a SCr of 4.77 1/30/23 and trended to 5.88 today. Primary nephrologist is Dr. Jose M Garibay, last seen in office 2/15/23. CKD thought to be in setting of advanced diabetic nephropathy. Previously elevated proteinuria to 22.6g, now 12 grams; no hematuria. Labs reviewed, pt clinically volume overloaded. Off Bumex gtt would start Bumex 4mg TID. Can switch to PO Bumex today (unless giving pRBC then would c/w IV).     No acute indication for RRT at this time, however explained to patient that should her renal function worsen or unable to improve her volume status then there is a chance she may require it. It would be beneficial for patient to get AVF inpatient. Will continue to discuss with patient. Pt. not a good candidate for PD at this time, perhaps in the future after better glycemic control. Ensure endocrinology f/u as outpatient.    Please obtain daily weights. Can give Metolazone 5mg Daily    TSAT 12% and ferritin 22. Given Epo 6k units 2/22. Needs IV Iron 200mg x 5 doses. Transfusion as per primary team.    Check blood gas. C/w NaHCO3 650mg TID    Check phos and calcium daily. PTH: 233(acceptable). Increase Sevelamer to 1,600mg TID with meals.    Monitor labs and strict urine output. Avoid nephrotoxins. Dose medications as per eGFR.    If you have any questions, please feel free to contact me  Lito Oglesby  Nephrology Fellow  [phone]; Prefer [employer] TEAMS  (After 5pm or on weekends please page the on-call fellow) Pt. with MIKALA on advanced CKD likely in setting of volume overload and progression of her underlying disease. [facility] noted to have a SCr of 4.77 1/30/23 and trended to 5.88 today. Primary nephrologist is Dr. Jose M Garibay, last seen in office 2/15/23. CKD thought to be in setting of advanced diabetic nephropathy. Previously elevated proteinuria to 22.6g, now 12 grams; no hematuria. Labs reviewed, pt clinically volume overloaded. Off Bumex gtt would start Bumex 4mg TID. Can switch to PO Bumex today (unless giving pRBC then would c/w IV).     No acute indication for RRT at this time, however explained to patient that should her renal function worsen or unable to improve her volume status then there is a chance she may require it. It would be beneficial for patient to get AVF inpatient. Will continue to discuss with patient. Pt. not a good candidate for PD at this time, perhaps in the future after better glycemic control. Ensure endocrinology f/u as outpatient.    Please obtain daily weights. Can increase Metolazone to 10 mg Daily    TSAT 12% and ferritin 22. Given Epo 6k units 2/22. Needs IV Iron 200mg x 5 doses. Transfusion as per primary team.    Check blood gas. C/w NaHCO3 650mg TID    Check phos and calcium daily. PTH: 233(acceptable). Increase Sevelamer to 1,600mg TID with meals.    Monitor labs and strict urine output. Avoid nephrotoxins. Dose medications as per eGFR.    If you have any questions, please feel free to contact me  Lito Oglesby  Nephrology Fellow  [phone]; Prefer [employer] TEAMS  (After 5pm or on weekends please page the on-call fellow)

## 2023-02-24 NOTE — PROGRESS NOTE ADULT - PROBLEM SELECTOR PLAN 3
-pt w/ sCr 5.15 on presentation   -pt w/ baseline CKD stage 5 i/s/o uncontrolled DM  -during last hospitalization baseline sCr around 4.9  -worsening renal function possibly i/s/o volume overload   -U/A, FeUrea, urine lytes, spot TP/CR suggestive of intrinsic renal disease   -c/w sodium bicarb 650 TID   -c/w sevelamer 800 TID w/ meals   -possibility of HD requirement depending on progression of kidney function discussed w/ pt  -s/p EPO 2/22 -pt w/ sCr 5.15 on presentation   -pt w/ baseline CKD stage 5 i/s/o uncontrolled DM  -during last hospitalization baseline sCr around 4.9  -worsening renal function possibly i/s/o volume overload   -U/A, FeUrea, urine lytes, spot TP/CR suggestive of intrinsic renal disease   -c/w sodium bicarb 650 TID   -c/w sevelamer 1600 TID w/ meals   -possibility of HD requirement depending on progression of kidney function discussed w/ pt  -s/p EPO 2/22

## 2023-02-24 NOTE — DISCHARGE NOTE NURSING/CASE MANAGEMENT/SOCIAL WORK - PATIENT PORTAL LINK FT
You can access the FollowMyHealth Patient Portal offered by [facility] by registering at the following website: [email or web address]. By joining [facility]’s FollowMyHealth portal, you will also be able to view your health information using other applications (apps) compatible with our system.

## 2023-02-24 NOTE — DISCHARGE NOTE NURSING/CASE MANAGEMENT/SOCIAL WORK - NSDCFUADDAPPT_GEN_ALL_CORE_FT
APPTS ARE READY TO BE MADE: [x ] YES    Best Family or Patient Contact (if needed):    Additional Information about above appointments (if needed):    1: nephrology (patient needs an earlier appt than 5/10)  2:   3:     Other comments or requests:

## 2023-02-24 NOTE — DISCHARGE NOTE PROVIDER - NSDCFUADDAPPT_GEN_ALL_CORE_FT
APPTS ARE READY TO BE MADE: [x ] YES    Best Family or Patient Contact (if needed):    Additional Information about above appointments (if needed):    1: nephrology (patient needs an earlier appt than 5/10)  2:   3:     Other comments or requests:    APPTS ARE READY TO BE MADE: [x ] YES    Best Family or Patient Contact (if needed):    Additional Information about above appointments (if needed):    1: nephrology (patient needs an earlier appt than 5/10)  2:   3:     Other comments or requests:   Patient was scheduled with Dr. Jose M Garibay on 04/26/2023 11:20am at [address] [place] [address] through the provider's office. Patient advised of appointment.    Patient was previously scheduled with JANNY Corona ON 03/03/2023 2:30pm at [address] [place] [phone].     APPTS ARE READY TO BE MADE: [x ] YES    Best Family or Patient Contact (if needed):    Additional Information about above appointments (if needed):    1: nephrology (patient needs an earlier appt than 5/10)  2:   3:     Other comments or requests:   Patient was scheduled with Dr. Jose M Garibay on 04/26/2023 11:20am at [address] [place] [address] through the provider's office. Patient advised of appointment.    Patient was previously scheduled with JANNY Corona ON 03/03/2023 2:30pm at [address] [place] [id number].    Patient was scheduled with Dr. Stiven Ramirez on 003/01/2023 1:30pm at [address] [place] [phone] through the provider's office. Patient advised of appointment.   APPTS ARE READY TO BE MADE: [x ] YES    Best Family or Patient Contact (if needed):    Additional Information about above appointments (if needed):    1: nephrology (patient needs an earlier appt than 5/10)  2:   3:     Other comments or requests:   Patient was scheduled with Dr. Jose M Garibay on 04/26/2023 11:20am at [address] [place] [address] through the provider's office. Patient advised of appointment.    Patient was previously scheduled with JANNY Corona ON 03/03/2023 2:30pm at [address] [place] [phone].    Patient was scheduled with Dr. Gabriela Ramirez on 003/01/2023 1:30pm at [address] [place] [phone] through the provider's office. Patient advised of appointment.

## 2023-02-24 NOTE — DISCHARGE NOTE PROVIDER - CARE PROVIDER_API CALL
Cheri Corona (NP; RN)  NP in Family Health  Follow Up Time: Routine    Jose M Garibay (MD)  Nephrology  [address] [place] [address], [address] [place] [address]  Phone: [phone]  Fax: [phone]  Established Patient  Follow Up Time: Routine

## 2023-02-24 NOTE — DISCHARGE NOTE PROVIDER - ATTENDING DISCHARGE PHYSICAL EXAMINATION:
PHYSICAL EXAM  GENERAL: NAD, lying comfortably in bed   HEAD:  Atraumatic, Normocephalic  EYES: EOMI b/l, conjunctiva and sclera clear  NECK: Supple, No LAD   CHEST/LUNG: Clear to auscultation bilaterally; No wheeze or ronchi  HEART: Regular rate and rhythm; S1 and S2 present, No murmurs, rubs, or gallops  ABDOMEN: Soft, Nontender, Nondistended; Bowel sounds present  EXTREMITIES:  2+ Peripheral Pulses, trace pitting edema in ankles b/l  NEURO: AAOx3, non-focal   SKIN: No rashes or lesions

## 2023-02-24 NOTE — DISCHARGE NOTE PROVIDER - NSDCCPTREATMENT_GEN_ALL_CORE_FT
PRINCIPAL PROCEDURE  Procedure: CXR, single AP view  Findings and Treatment: FINDINGS:  The heart size is not well evaluated on an AP film  There is bilateral interstitial prominence with small bilateral pleural   effusions and bibasilar linear atelectasis. No focal consolidations.  There is no pneumothorax or pleural effusion.  IMPRESSION:  Bilateral interstitial prominence, slightly increased from the prior   study, which may represent pulmonary edema or infection on top of chronic   lung disease. Small bilateral effusions with bibasilar linear atelectasis.  .

## 2023-02-25 VITALS
SYSTOLIC BLOOD PRESSURE: 143 MMHG | DIASTOLIC BLOOD PRESSURE: 72 MMHG | RESPIRATION RATE: 18 BRPM | TEMPERATURE: 98 F | HEART RATE: 94 BPM | OXYGEN SATURATION: 98 %

## 2023-02-25 LAB
ANION GAP SERPL CALC-SCNC: 21 MMOL/L — HIGH (ref 5–17)
BUN SERPL-MCNC: 122 MG/DL — HIGH (ref 7–23)
CALCIUM SERPL-MCNC: 8.2 MG/DL — LOW (ref 8.4–10.5)
CHLORIDE SERPL-SCNC: 92 MMOL/L — LOW (ref 96–108)
CO2 SERPL-SCNC: 23 MMOL/L — SIGNIFICANT CHANGE UP (ref 22–31)
CREAT SERPL-MCNC: 6.27 MG/DL — HIGH (ref 0.5–1.3)
EGFR: 8 ML/MIN/1.73M2 — LOW
GLUCOSE BLDC GLUCOMTR-MCNC: 124 MG/DL — HIGH (ref 70–99)
GLUCOSE BLDC GLUCOMTR-MCNC: 422 MG/DL — HIGH (ref 70–99)
GLUCOSE BLDC GLUCOMTR-MCNC: 437 MG/DL — HIGH (ref 70–99)
GLUCOSE SERPL-MCNC: 230 MG/DL — HIGH (ref 70–99)
HCT VFR BLD CALC: 23.3 % — LOW (ref 34.5–45)
HGB BLD-MCNC: 7.7 G/DL — LOW (ref 11.5–15.5)
MAGNESIUM SERPL-MCNC: 2.4 MG/DL — SIGNIFICANT CHANGE UP (ref 1.6–2.6)
MCHC RBC-ENTMCNC: 27.8 PG — SIGNIFICANT CHANGE UP (ref 27–34)
MCHC RBC-ENTMCNC: 33 GM/DL — SIGNIFICANT CHANGE UP (ref 32–36)
MCV RBC AUTO: 84.1 FL — SIGNIFICANT CHANGE UP (ref 80–100)
NRBC # BLD: 0 /100 WBCS — SIGNIFICANT CHANGE UP (ref 0–0)
PHOSPHATE SERPL-MCNC: 9.1 MG/DL — HIGH (ref 2.5–4.5)
PLATELET # BLD AUTO: 274 K/UL — SIGNIFICANT CHANGE UP (ref 150–400)
POTASSIUM SERPL-MCNC: 4.7 MMOL/L — SIGNIFICANT CHANGE UP (ref 3.5–5.3)
POTASSIUM SERPL-SCNC: 4.7 MMOL/L — SIGNIFICANT CHANGE UP (ref 3.5–5.3)
RBC # BLD: 2.77 M/UL — LOW (ref 3.8–5.2)
RBC # FLD: 12.9 % — SIGNIFICANT CHANGE UP (ref 10.3–14.5)
SODIUM SERPL-SCNC: 136 MMOL/L — SIGNIFICANT CHANGE UP (ref 135–145)
WBC # BLD: 8.11 K/UL — SIGNIFICANT CHANGE UP (ref 3.8–10.5)
WBC # FLD AUTO: 8.11 K/UL — SIGNIFICANT CHANGE UP (ref 3.8–10.5)

## 2023-02-25 PROCEDURE — 83970 ASSAY OF PARATHORMONE: CPT

## 2023-02-25 PROCEDURE — 85025 COMPLETE CBC W/AUTO DIFF WBC: CPT

## 2023-02-25 PROCEDURE — 82728 ASSAY OF FERRITIN: CPT

## 2023-02-25 PROCEDURE — 84540 ASSAY OF URINE/UREA-N: CPT

## 2023-02-25 PROCEDURE — 83935 ASSAY OF URINE OSMOLALITY: CPT

## 2023-02-25 PROCEDURE — 84300 ASSAY OF URINE SODIUM: CPT

## 2023-02-25 PROCEDURE — 96365 THER/PROPH/DIAG IV INF INIT: CPT

## 2023-02-25 PROCEDURE — 84133 ASSAY OF URINE POTASSIUM: CPT

## 2023-02-25 PROCEDURE — 87040 BLOOD CULTURE FOR BACTERIA: CPT

## 2023-02-25 PROCEDURE — 80061 LIPID PANEL: CPT

## 2023-02-25 PROCEDURE — 86850 RBC ANTIBODY SCREEN: CPT

## 2023-02-25 PROCEDURE — 99285 EMERGENCY DEPT VISIT HI MDM: CPT | Mod: 25

## 2023-02-25 PROCEDURE — 85027 COMPLETE CBC AUTOMATED: CPT

## 2023-02-25 PROCEDURE — 83540 ASSAY OF IRON: CPT

## 2023-02-25 PROCEDURE — 99232 SBSQ HOSP IP/OBS MODERATE 35: CPT | Mod: GC

## 2023-02-25 PROCEDURE — 86901 BLOOD TYPING SEROLOGIC RH(D): CPT

## 2023-02-25 PROCEDURE — 80053 COMPREHEN METABOLIC PANEL: CPT

## 2023-02-25 PROCEDURE — 96375 TX/PRO/DX INJ NEW DRUG ADDON: CPT

## 2023-02-25 PROCEDURE — 80048 BASIC METABOLIC PNL TOTAL CA: CPT

## 2023-02-25 PROCEDURE — 82947 ASSAY GLUCOSE BLOOD QUANT: CPT

## 2023-02-25 PROCEDURE — 83880 ASSAY OF NATRIURETIC PEPTIDE: CPT

## 2023-02-25 PROCEDURE — 82803 BLOOD GASES ANY COMBINATION: CPT

## 2023-02-25 PROCEDURE — 71045 X-RAY EXAM CHEST 1 VIEW: CPT

## 2023-02-25 PROCEDURE — 87637 SARSCOV2&INF A&B&RSV AMP PRB: CPT

## 2023-02-25 PROCEDURE — 82962 GLUCOSE BLOOD TEST: CPT

## 2023-02-25 PROCEDURE — 85014 HEMATOCRIT: CPT

## 2023-02-25 PROCEDURE — 93308 TTE F-UP OR LMTD: CPT

## 2023-02-25 PROCEDURE — 96367 TX/PROPH/DG ADDL SEQ IV INF: CPT

## 2023-02-25 PROCEDURE — 85018 HEMOGLOBIN: CPT

## 2023-02-25 PROCEDURE — 86900 BLOOD TYPING SEROLOGIC ABO: CPT

## 2023-02-25 PROCEDURE — 85730 THROMBOPLASTIN TIME PARTIAL: CPT

## 2023-02-25 PROCEDURE — 93005 ELECTROCARDIOGRAM TRACING: CPT

## 2023-02-25 PROCEDURE — 84100 ASSAY OF PHOSPHORUS: CPT

## 2023-02-25 PROCEDURE — 83735 ASSAY OF MAGNESIUM: CPT

## 2023-02-25 PROCEDURE — 87086 URINE CULTURE/COLONY COUNT: CPT

## 2023-02-25 PROCEDURE — 84295 ASSAY OF SERUM SODIUM: CPT

## 2023-02-25 PROCEDURE — 93306 TTE W/DOPPLER COMPLETE: CPT

## 2023-02-25 PROCEDURE — 85610 PROTHROMBIN TIME: CPT

## 2023-02-25 PROCEDURE — 82330 ASSAY OF CALCIUM: CPT

## 2023-02-25 PROCEDURE — 81001 URINALYSIS AUTO W/SCOPE: CPT

## 2023-02-25 PROCEDURE — 83550 IRON BINDING TEST: CPT

## 2023-02-25 PROCEDURE — 82570 ASSAY OF URINE CREATININE: CPT

## 2023-02-25 PROCEDURE — 36415 COLL VENOUS BLD VENIPUNCTURE: CPT

## 2023-02-25 PROCEDURE — 82310 ASSAY OF CALCIUM: CPT

## 2023-02-25 PROCEDURE — 84156 ASSAY OF PROTEIN URINE: CPT

## 2023-02-25 PROCEDURE — 97162 PT EVAL MOD COMPLEX 30 MIN: CPT

## 2023-02-25 PROCEDURE — 99239 HOSP IP/OBS DSCHRG MGMT >30: CPT

## 2023-02-25 PROCEDURE — 83605 ASSAY OF LACTIC ACID: CPT

## 2023-02-25 PROCEDURE — 82435 ASSAY OF BLOOD CHLORIDE: CPT

## 2023-02-25 PROCEDURE — 94640 AIRWAY INHALATION TREATMENT: CPT

## 2023-02-25 PROCEDURE — 84132 ASSAY OF SERUM POTASSIUM: CPT

## 2023-02-25 RX ORDER — FERROUS SULFATE 325(65) MG
1 TABLET ORAL
Qty: 5 | Refills: 0
Start: 2023-02-25 | End: 2023-03-01

## 2023-02-25 RX ADMIN — CARVEDILOL PHOSPHATE 6.25 MILLIGRAM(S): 80 CAPSULE, EXTENDED RELEASE ORAL at 05:37

## 2023-02-25 RX ADMIN — AMLODIPINE BESYLATE 10 MILLIGRAM(S): 2.5 TABLET ORAL at 05:36

## 2023-02-25 RX ADMIN — Medication 650 MILLIGRAM(S): at 05:37

## 2023-02-25 RX ADMIN — SEVELAMER CARBONATE 1600 MILLIGRAM(S): 2400 POWDER, FOR SUSPENSION ORAL at 09:32

## 2023-02-25 RX ADMIN — Medication 1 SPRAY(S): at 05:34

## 2023-02-25 RX ADMIN — Medication 100 MILLIGRAM(S): at 05:37

## 2023-02-25 RX ADMIN — HEPARIN SODIUM 5000 UNIT(S): 5000 INJECTION INTRAVENOUS; SUBCUTANEOUS at 05:37

## 2023-02-25 RX ADMIN — BUMETANIDE 4 MILLIGRAM(S): 0.25 INJECTION INTRAMUSCULAR; INTRAVENOUS at 05:36

## 2023-02-25 RX ADMIN — Medication 10 MILLIGRAM(S): at 05:36

## 2023-02-25 NOTE — PROGRESS NOTE ADULT - SUBJECTIVE AND OBJECTIVE BOX
[facility] DIVISION OF KIDNEY DISEASES AND HYPERTENSION -- FOLLOW UP NOTE  --------------------------------------------------------------------------------  HPI:  Patient is a 52 year old female with PMH of Sarcoidosis, DM2, HTN and CKD stage 5 who presents to the hospital with worsening LE edema and shortness of breath. The nephrology team was consulted for advanced kidney disease and volume overload. On review of [facility] pt noted to have a SCr of 4.77 1/30/23 and on arrival today elevated to 5.15. Of note the patient was recently admitted to [facility] after presenting with LE pain and shortness of breath as well. The patient follows with Dr Jose M Garibay as her primary nephrologist, last seen in the office 2/15/23. At that time she was advised to increase her diuretics to Torsemide 20mg daily and states she has been adherent. However over the course of the past few days she noticed worsening edema and shortness of breath. She states the breathing worsens when she lays down flat. She admits she has been urinating well without issues. She denied any chest pain, nausea, vomiting, abdominal pain, change in appetite or taste.      Pt. seen this AM. No acute complaints. Titrated to PO bumex. Swelling decreased.     PAST HISTORY  --------------------------------------------------------------------------------  No significant changes to PMH, PSH, FHx, SHx, unless otherwise noted    ALLERGIES & MEDICATIONS  --------------------------------------------------------------------------------  Allergies    No Known Allergies    Intolerances      Standing Inpatient Medications  amLODIPine   Tablet 10 milliGRAM(s) Oral daily  atorvastatin 80 milliGRAM(s) Oral at bedtime  buMETAnide 4 milliGRAM(s) Oral <User Schedule>  carvedilol 6.25 milliGRAM(s) Oral every 12 hours  dextrose 5%. 1000 milliLiter(s) IV Continuous <Continuous>  dextrose 5%. 1000 milliLiter(s) IV Continuous <Continuous>  dextrose 50% Injectable 25 Gram(s) IV Push once  dextrose 50% Injectable 12.5 Gram(s) IV Push once  dextrose 50% Injectable 25 Gram(s) IV Push once  epoetin suzette-epbx (RETACRIT) Injectable 6000 Unit(s) SubCutaneous once  famotidine    Tablet 20 milliGRAM(s) Oral daily  fluticasone propionate 50 MICROgram(s)/spray Nasal Spray 1 Spray(s) Both Nostrils two times a day  glucagon  Injectable 1 milliGRAM(s) IntraMuscular once  heparin   Injectable 5000 Unit(s) SubCutaneous every 8 hours  hydrALAZINE 100 milliGRAM(s) Oral three times a day  influenza   Vaccine 0.5 milliLiter(s) IntraMuscular once  insulin glargine Injectable (LANTUS) 25 Unit(s) SubCutaneous at bedtime  insulin lispro (ADMELOG) corrective regimen sliding scale   SubCutaneous three times a day before meals  insulin lispro (ADMELOG) corrective regimen sliding scale   SubCutaneous at bedtime  insulin lispro Injectable (ADMELOG) 7 Unit(s) SubCutaneous three times a day before meals  iron sucrose IVPB 200 milliGRAM(s) IV Intermittent every 24 hours  metolazone 10 milliGRAM(s) Oral daily  predniSONE   Tablet 10 milliGRAM(s) Oral daily  sevelamer carbonate 1600 milliGRAM(s) Oral three times a day with meals  sodium bicarbonate 650 milliGRAM(s) Oral three times a day    PRN Inpatient Medications  acetaminophen     Tablet .. 650 milliGRAM(s) Oral every 6 hours PRN  aluminum hydroxide/magnesium hydroxide/simethicone Suspension 30 milliLiter(s) Oral every 4 hours PRN  dextrose Oral Gel 15 Gram(s) Oral once PRN  melatonin 3 milliGRAM(s) Oral at bedtime PRN  ondansetron Injectable 4 milliGRAM(s) IV Push every 8 hours PRN      REVIEW OF SYSTEMS  --------------------------------------------------------------------------------  As per HPI    VITALS/PHYSICAL EXAM  --------------------------------------------------------------------------------  T(C): 37.4 (02-25-23 @ 04:33), Max: 37.4 (02-25-23 @ 04:33)  HR: 93 (02-25-23 @ 04:33) (91 - 94)  BP: 133/71 (02-25-23 @ 04:33) (108/66 - 134/71)  RR: 18 (02-25-23 @ 04:33) (18 - 18)  SpO2: 100% (02-25-23 @ 04:33) (95% - 100%)  Wt(kg): --        02-25-23 @ 07:01  -  02-25-23 @ 10:39  --------------------------------------------------------  IN: 240 mL / OUT: 0 mL / NET: 240 mL      Physical Exam:  	Gen: NAD  	HEENT: MMM  	Pulm: CTA B/L  	CV: S1S2  	Abd: Soft, +BS   	Ext: No LE edema B/L  	Neuro: Awake  	Skin: Warm and dry  	Vascular access:      LABS/STUDIES  --------------------------------------------------------------------------------              7.7    8.11  >-----------<  274      [02-25-23 @ 00:46]              23.3     136  |  92  |  122  ----------------------------<  230      [02-25-23 @ 00:45]  4.7   |  23  |  6.27        Ca     8.2     [02-25-23 @ 00:45]      Mg     2.4     [02-25-23 @ 00:45]      Phos  9.1     [02-25-23 @ 00:45]    TPro  5.7  /  Alb  3.3  /  TBili  0.1  /  DBili  x   /  AST  13  /  ALT  19  /  AlkPhos  52  [02-24-23 @ 07:06]          Creatinine Trend:  SCr 6.27 [02-25 @ 00:45]  SCr 6.42 [02-24 @ 07:06]  SCr 5.92 [02-23 @ 16:29]  SCr 5.92 [02-23 @ 06:50]  SCr 5.93 [02-23 @ 01:02]    Urinalysis - [02-20-23 @ 15:05]      Color Colorless / Appearance Clear / SG 1.010 / pH 6.5      Gluc 200 mg/dL / Ketone Negative  / Bili Negative / Urobili Negative       Blood Negative / Protein 300 mg/dL / Leuk Est Negative / Nitrite Negative      RBC 2 / WBC 2 / Hyaline 1 / Gran  / Sq Epi  / Non Sq Epi 2 / Bacteria Negative    Urine Creatinine 17      [02-22-23 @ 07:15]  Urine Protein 206      [02-22-23 @ 07:15]  Urine Sodium 100      [02-20-23 @ 22:52]  Urine Urea Nitrogen 237      [02-20-23 @ 22:52]  Urine Potassium 26      [02-20-23 @ 22:52]  Urine Osmolality 339      [02-20-23 @ 22:52]    Iron 33, TIBC 280, %sat 12      [02-23-23 @ 06:50]  Ferritin 22      [02-23-23 @ 06:50]  PTH -- (Ca 8.1)      [02-23-23 @ 06:50]   218  PTH -- (Ca 8.0)      [02-22-23 @ 17:33]   233  PTH -- (Ca 8.1)      [01-31-23 @ 12:34]   98  Vitamin D (25OH) 7.6      [01-27-23 @ 18:12]  HbA1c 11.2      [08-09-19 @ 21:20]  TSH 1.71      [01-28-23 @ 07:29]  Lipid: chol 254, , , LDL --      [02-21-23 @ 07:58]      JOSLYN: titer Negative, pattern --      [01-28-23 @ 07:29]  dsDNA <12      [01-28-23 @ 07:29]

## 2023-02-25 NOTE — PROGRESS NOTE ADULT - PROBLEM SELECTOR PLAN 2
-pt endorsing worsening LE edema and SOB since being d/c from hospital off of home torsemide   -pt w/ b/l infiltrates on CXR suspicious for pulmonary edema   -PE w/ crackles b/l   -volume overload due to under diuresis i/s/o CKD vs CHF  -pt s/p bumex 4 IV in ED   -nephro c/s; appreciate recs   -transitioned to bumex 4 PO TID  -strict Is/Os  -daily weights   -POCUS w/ grossly intact cardiac function   -proBNP elevated at 7900  -TTE w/ no systolic dysfunction, likely fluid overload i/s/o CKD

## 2023-02-25 NOTE — PROGRESS NOTE ADULT - ASSESSMENT
52 y.o. PMH CKD, HTN, sarcoidosis, DMII who presents w/ SOB and LE edema. Labs and VSS significant for leukocytosis, tachypnea and new O2 requirement and imaging consistent w/ b/l infiltrates suggestive of pulm edema vs infection. Pt admitted for AHRF likely i/s/o volume overload due to underdiuresis i/s/o CKD vs CHF. 
Pt. with MIKALA on advanced CKD
52 y.o. PMH CKD, HTN, sarcoidosis, DMII who presents w/ SOB and LE edema. Labs and VSS significant for leukocytosis, tachypnea and new O2 requirement and imaging consistent w/ b/l infiltrates suggestive of pulm edema vs infection. Pt admitted for AHRF likely i/s/o volume overload due to underdiuresis i/s/o CKD vs CHF. 
Pt. with MIKALA on advanced CKD
Pt. with MIKALA on advanced CKD
52 y.o. PMH CKD, HTN, sarcoidosis, DMII who presents w/ SOB and LE edema. Labs and VSS significant for leukocytosis, tachypnea and new O2 requirement and imaging consistent w/ b/l infiltrates suggestive of pulm edema vs infection. Pt admitted for AHRF likely i/s/o volume overload due to underdiuresis i/s/o CKD vs CHF. 

## 2023-02-25 NOTE — PROGRESS NOTE ADULT - PROVIDER SPECIALTY LIST ADULT
Internal Medicine
Nephrology
Internal Medicine

## 2023-02-25 NOTE — PROGRESS NOTE ADULT - PROBLEM SELECTOR PLAN 6
-c/w home meds coreg, hydralazine and amlodipine

## 2023-02-25 NOTE — PROGRESS NOTE ADULT - PROBLEM SELECTOR PLAN 1
Pt. with MIKALA on advanced CKD likely in setting of volume overload and progression of her underlying disease. On review of [facility] pt noted to have a SCr of 4.77 1/30/23 and trended to 5.88 today. Primary nephrologist is Dr. Jose M Garibay, last seen in office 2/15/23. CKD thought to be in setting of advanced diabetic nephropathy. Previously elevated proteinuria to 22.6g, now 12 grams; no hematuria. Labs reviewed, pt clinically volume overloaded. Off Bumex gtt. Can discharge on PO Bumex 2mg BID. Needs close nephrology f/u with labs within 1 week. Discussed with Patient.     No acute indication for RRT at this time, however explained to patient that should her renal function worsen or unable to improve her volume status then there is a chance she may require it. It would be beneficial for patient to get AVF inpatient. Will continue to discuss with patient. Pt. not a good candidate for PD at this time, perhaps in the future after better glycemic control. Ensure endocrinology f/u as outpatient.    Please obtain daily weights. Can increase Metolazone to 10 mg Daily    TSAT 12% and ferritin 22. Given Epo 6k units 2/22. Needs IV Iron 200mg x 5 doses. Transfusion as per primary team.    Check blood gas. C/w NaHCO3 650mg TID    Check phos and calcium daily. PTH: 233(acceptable). Increase Sevelamer to 1,600mg TID with meals.    Monitor labs and strict urine output. Avoid nephrotoxins. Dose medications as per eGFR.    If you have any questions, please feel free to contact me  Lito Oglesby  Nephrology Fellow  [phone]; Prefer [employer] TEAMS  (After 5pm or on weekends please page the on-call fellow)

## 2023-02-25 NOTE — PROGRESS NOTE ADULT - PROBLEM SELECTOR PLAN 1
-pt w/ SIRS criteria w/ tachypnea and leukocytosis to 15 on admission w/ new O2 requirement   -CXR w/ b/l infiltrates suspicious for pulmonary edema vs infection   -pt w/ lactate wnl   -s/p cefepime and azithro in ED   -will hold abx given low suspicion for inf   -blood cx NGTD  -likely i/s/o volume overload rather than infection   -pt s/p bumex 4 IV  -diuresis as below

## 2023-02-25 NOTE — PROGRESS NOTE ADULT - REASON FOR ADMISSION
SOB and LE edema

## 2023-02-25 NOTE — PROGRESS NOTE ADULT - PROBLEM SELECTOR PROBLEM 1
Acute kidney injury superimposed on CKD
Sepsis with acute hypoxic respiratory failure

## 2023-02-25 NOTE — PROGRESS NOTE ADULT - PROBLEM SELECTOR PLAN 8
DVT PPx: heparin subq  Diet: DASH/Consistent Carb

## 2023-02-25 NOTE — PROGRESS NOTE ADULT - ATTENDING COMMENTS
Patient is a 52 year old female with PMH of Sarcoidosis, DM2, HTN and CKD stage 5 who presents to the hospital with worsening LE edema and shortness of breath. The nephrology team was consulted for advanced kidney disease and volume overload. On review of [facility] pt noted to have a SCr of 4.77 1/30/23 and on arrival today elevated to 5.15. Of note the patient was recently admitted to [facility] after presenting with LE pain and shortness of breath as well. The patient follows with Dr Jose M Garibay as her primary nephrologist, last seen in the office 2/15/23. At that time she was advised to increase her diuretics to Torsemide 20mg daily and states she has been adherent. However over the course of the past few days she noticed worsening edema and shortness of breath. Now much less SOB following aggressive diuretic rx.  Still with significant edema on PE>  Confirmed she has resisted access placement and counting on one of 6 children donating kidney although no evaluation has taken place.  NO uremic symptoms including no tremor, asterixis.  Speaking comfortably in full sentences.  Less edema, on bumex infusion-->intermittent.  Goal transition to PO for outpatient management.      1.  ARF on CKD5--no absolute HD indications at present.  Emphasized need for volume optimization and liklihood dialysis required before pre-emptive transplant evaluation.  Unclear if acute cardiorenal component vs isolated right sided associated renal venous congestion  Concur with rx as outlined.  Reviewed potential PD option which may be challenging given underlying issues with glycemic control.  Discussed with outpt nephrologist who favors hemodialysis option unless re-emptive transplant can be accomplished.  We will recommend AV access but if resolve volume overload--> PO meds can be d/c for f/u discussions.  She wants further review with family.    2.  Volume overload--diuretic rx even if --> uremic symptoms.  Goal is alleviation 3.  BNP >7K c/w CHF dysfunction.  Would add metolazone as part of effort to transition to PO meds.    3.  Shortness of breath--no requirement for UF or NIPPV.  Continue diuresis.  4.  Hyperphosphatemia--check PTH, binder goal <5.5.  Remains elevated and titrate further    discussed with med team  Lico Martines MD  contact me on TEAMS
Patient is a 52 year old female with PMH of Sarcoidosis, DM2, HTN and CKD stage 5 who presents to the hospital with worsening LE edema and shortness of breath. The nephrology team was consulted for advanced kidney disease and volume overload. On review of [facility] pt noted to have a SCr of 4.77 1/30/23 and on arrival today elevated to 5.15. Of note the patient was recently admitted to [facility] after presenting with LE pain and shortness of breath as well. The patient follows with Dr Jose M Garibay as her primary nephrologist, last seen in the office 2/15/23. At that time she was advised to increase her diuretics to Torsemide 20mg daily and states she has been adherent. However over the course of the past few days she noticed worsening edema and shortness of breath. Now much less SOB following aggressive diuretic rx.  Still with significant edema on PE>  Confirmed she has resisted access placement and counting on one of 6 children donating kidney although no evaluation has taken place.  NO uremic symptoms including no tremor, asterixis.  Speaking comfortably in full sentences.  Less edema, on bumex infusion-->intermittent.  Transition to PO for outpatient management.  Now rx anemia.  No SOB, edema dramatically improved.      1.  ARF on CKD5--no absolute HD indications at present.  Emphasized need for volume optimization and likelihood dialysis required before pre-emptive transplant evaluation.  Acute cardiorenal component < isolated right sided associated renal venous congestion  Concur with rx as outlined.  Reviewed potential PD option which may be challenging given underlying issues with glycemic control.  Discussed with outpt nephrologist who favors hemodialysis option unless re-emptive transplant can be accomplished.  We will recommend AV access but if resolve volume overload--> PO meds can be d/c for f/u discussions.  She wants further review with family.  F/U with Dr. Jose M Garibay  2.  Volume overload--diuretic rx even if --> uremic symptoms.  Goal is alleviation 3.  BNP >7K c/w CHF dysfunction.  Would add metolazone as part of effort to transition to PO meds.    3.  Shortness of breath--no requirement for UF or NIPPV.  Continue diuresis unless uremia supervenes.  4.  Hyperphosphatemia--check PTH, binder goal <5.5.  Remains elevated and titrate further  5.  Anemia--Fe, PRBC as outlined with [facility]    discussed with med team  Lico Martines MD  contact me on TEAMS
Patient is a 52 year old female with PMH of Sarcoidosis, DM2, HTN and CKD stage 5 who presents to the hospital with worsening LE edema and shortness of breath. The nephrology team was consulted for advanced kidney disease and volume overload. On review of [facility] pt noted to have a SCr of 4.77 1/30/23 and on arrival today elevated to 5.15. Of note the patient was recently admitted to [facility] after presenting with LE pain and shortness of breath as well. The patient follows with Dr Jose M Garibay as her primary nephrologist, last seen in the office 2/15/23. At that time she was advised to increase her diuretics to Torsemide 20mg daily and states she has been adherent. However over the course of the past few days she noticed worsening edema and shortness of breath. Now much less SOB following aggressive diuretic rx.  Still with significant edema on PE>  Confirmed she has resisted access placement and counting on one of 6 children donating kidney although no evaluation has taken place.  NO uremic symptoms including no tremor, asterixis.  No supplemental O2 at present and speaking comfortably in full sentences.    1.  ARF on CKD5--no absolute HD indications.  Emphasized need for volume optimization and liklihood HD required before pre-emptive transplant evaluation.  Unclear if acute cardiorenal component vs isolated right sided associated renal venous congestion  Concur with rx as outlined  2.  Volume overload--diuretic rx even if --> uremic symptoms.  Goal is alleviation 3.  BNP >7K c/w CHF dysfunction  3.  Shortness of breath--no requirement for UF or NIPPV.    4.  Hyperphosphatemia--check PTH, binders    discussed with med team  Lico Chaudhary MD  contact me on TEAMS
Patient is a 52 year old female with PMH of Sarcoidosis, DM2, HTN and CKD stage 5 who presents to the hospital with worsening LE edema and shortness of breath. The nephrology team was consulted for advanced kidney disease and volume overload. On review of [facility] pt noted to have a SCr of 4.77 1/30/23 and on arrival today elevated to 5.15. Of note the patient was recently admitted to [facility] after presenting with LE pain and shortness of breath as well. The patient follows with Dr Jose M Garibay as her primary nephrologist, last seen in the office 2/15/23. At that time she was advised to increase her diuretics to Torsemide 20mg daily and states she has been adherent. However over the course of the past few days she noticed worsening edema and shortness of breath. Now much less SOB following aggressive diuretic rx.  Still with significant edema on PE>  Confirmed she has resisted access placement and counting on one of 6 children donating kidney although no evaluation has taken place.  NO uremic symptoms including no tremor, asterixis.  Supplemental O2 at present and speaking comfortably in full sentences.  Less edema, on bumex infusion    1.  ARF on CKD5--no absolute HD indications.  Emphasized need for volume optimization and liklihood dialysis required before pre-emptive transplant evaluation.  Unclear if acute cardiorenal component vs isolated right sided associated renal venous congestion  Concur with rx as outlined.  Reviewed potential PD option which may be challenging given underlying issues with glycemic control.  Discussed with outpt nephrologist who favors hemodialysis option unless re-emptive transplant can be accomplished.  We will recommend AV access but if resolve volume overload--> PO meds can be d/c for f/u discussions  2.  Volume overload--diuretic rx even if --> uremic symptoms.  Goal is alleviation 3.  BNP >7K c/w CHF dysfunction.  Would add metolazone as part of effort to transition to PO meds.    3.  Shortness of breath--no requirement for UF or NIPPV.  Continue diuresis.  4.  Hyperphosphatemia--check PTH, binder goal <5.5    discussed with med team  Lico Martines MD  contact me on TEAMS
Patient is a 52 year old female with PMH of Sarcoidosis, DM2, HTN and CKD stage 5 who presents to the hospital with worsening LE edema and shortness of breath. The nephrology team was consulted for advanced kidney disease and volume overload. On review of [facility] pt noted to have a SCr of 4.77 1/30/23 and on arrival today elevated to 5.15. Of note the patient was recently admitted to [facility] after presenting with LE pain and shortness of breath as well. The patient follows with Dr Jose M Garibay as her primary nephrologist, last seen in the office 2/15/23. At that time she was advised to increase her diuretics to Torsemide 20mg daily and states she has been adherent. However over the course of the past few days she noticed worsening edema and shortness of breath. Now much less SOB following aggressive diuretic rx.  Still with significant edema on PE>  Confirmed she has resisted access placement and counting on one of 6 children donating kidney although no evaluation has taken place.  NO uremic symptoms including no tremor, asterixis.  Speaking comfortably in full sentences.  Less edema, on bumex infusion-->intermittent.  Goal transition to PO for outpatient management.  Now rx anemai    1.  ARF on CKD5--no absolute HD indications at present.  Emphasized need for volume optimization and likelihood dialysis required before pre-emptive transplant evaluation.  Acute cardiorenal component < isolated right sided associated renal venous congestion  Concur with rx as outlined.  Reviewed potential PD option which may be challenging given underlying issues with glycemic control.  Discussed with outpt nephrologist who favors hemodialysis option unless re-emptive transplant can be accomplished.  We will recommend AV access but if resolve volume overload--> PO meds can be d/c for f/u discussions.  She wants further review with family.    2.  Volume overload--diuretic rx even if --> uremic symptoms.  Goal is alleviation 3.  BNP >7K c/w CHF dysfunction.  Would add metolazone as part of effort to transition to PO meds.    3.  Shortness of breath--no requirement for UF or NIPPV.  Continue diuresis unless uremia supervenes.  4.  Hyperphosphatemia--check PTH, binder goal <5.5.  Remains elevated and titrate further  5.  Anemia--Fe, PRBC as outlined with [facility]    discussed with med team  Lico Martines MD  contact me on TEAMS
52 y.o. PMH CKD, HTN, sarcoidosis, DMII who presents w/ SOB and LE edema with tachypnea and new O2 requirement and imaging consistent w/ b/l infiltrates suggestive of pulm edema vs infection. Pt admitted for AHRF likely i/s/o volume overload due to progression of CKD    Currently with crackles, edema in hands, legs, interstitial spaces  Mild hyperkalemia as well, on bumex drip with about 150-200 ml/hr urine output  PrimaFit; io q4 hours  Monitor off abx - no source of infection suspected  q8 bmp wshile on diuretic infusion and with mild hyperkalemia  may need to plan for dialysis as per nephro
52 y.o. PMH CKD, HTN, sarcoidosis, DMII who presents w/ SOB and LE edema with tachypnea and new O2 requirement and imaging consistent w/ b/l infiltrates suggestive of pulm edema vs infection. Pt admitted for AHRF likely i/s/o volume overload due to progression of CKD    crackles, edema in hands, legs, interstitial spaces improved with significant diuresis - transitioned 2/23 to intermittent IV diuretic  PrimaFit; io q4 hours  Monitor off abx - no source of infection suspected  may need to plan for dialysis in the medium-term as per nephro - discussed with nephrology team and patient - she is amenable to exploring the notion of dialysis further at this time and wishes to discuss with her children and the nephrology team re: PD vs HD - her daughter will come to visit 2/24 and she will discuss with the team further after then
52 y.o. PMH CKD, HTN, sarcoidosis, DMII who presents w/ SOB and LE edema with tachypnea and new O2 requirement and imaging consistent w/ b/l infiltrates suggestive of pulm edema vs infection. Pt admitted for AHRF likely i/s/o volume overload due to progression of CKD    crackles, edema in hands, legs, interstitial spaces improved with neg negative 2.7 L  PrimaFit; io q4 hours  Monitor off abx - no source of infection suspected  q8 bmp while on diuretic infusion and with mild hyperkalemia  may need to plan for dialysis as per nephro - discussed with nephrology team and patient - she is amenable to exploring the notion of dialysis further at this time and wishes to discuss with her children and the nephrology team re: PD vs HD
52 y.o. PMH CKD, HTN, sarcoidosis, DMII who presents w/ SOB and LE edema with tachypnea and new O2 requirement and imaging consistent w/ b/l infiltrates suggestive of pulm edema vs infection. Pt admitted for AHRF likely i/s/o volume overload due to progression of CKD    crackles, edema in hands, legs, interstitial spaces improved with significant diuresis - transitioned 2/23 to intermittent IV diuretic  PrimaFit; io q4 hours  Monitor off abx - no source of infection suspected  may need to plan for dialysis in the medium-term as per nephro - discussed with nephrology team and patient - she is amenable to exploring the notion of dialysis further at this time and wishes to discuss with her children and the nephrology team re: PD vs HD  she has made the decision to pursue PD outpatient if feasible - we will switch her to PO diuresis and if she tolerates this regimen, she can feasibly be discharged over the weekend as her edema has drastically improved

## 2023-02-25 NOTE — PROGRESS NOTE ADULT - PROBLEM SELECTOR PLAN 5
-pt w/ home regimen lantus 36U and ademolog 10U premeal  -will start lantus 20U and CANDI  -A1c 10.6 in January -pt w/ home regimen lantus 36U and ademolog 10U premeal  -c/w lantus 25U, admelog 7U and CANDI   -A1c 10.6 in January

## 2023-02-25 NOTE — PROGRESS NOTE ADULT - PROBLEM SELECTOR PROBLEM 3
Acute kidney injury superimposed on CKD
Hpi Title: Evaluation of a Skin Lesion
Acute kidney injury superimposed on CKD

## 2023-02-25 NOTE — PROGRESS NOTE ADULT - PROBLEM SELECTOR PLAN 4
-pt w/ biopsy proven sarcoidosis in 2010   -no lymphadenopathy noted on PE   -c/w home prednisone

## 2023-02-25 NOTE — PROGRESS NOTE ADULT - PROBLEM SELECTOR PLAN 3
-pt w/ sCr 5.15 on presentation   -pt w/ baseline CKD stage 5 i/s/o uncontrolled DM  -during last hospitalization baseline sCr around 4.9  -worsening renal function possibly i/s/o volume overload   -U/A, FeUrea, urine lytes, spot TP/CR suggestive of intrinsic renal disease   -c/w sodium bicarb 650 TID   -c/w sevelamer 1600 TID w/ meals   -possibility of HD requirement depending on progression of kidney function discussed w/ pt  -s/p EPO 2/22

## 2023-02-26 LAB
CULTURE RESULTS: SIGNIFICANT CHANGE UP
CULTURE RESULTS: SIGNIFICANT CHANGE UP
SPECIMEN SOURCE: SIGNIFICANT CHANGE UP
SPECIMEN SOURCE: SIGNIFICANT CHANGE UP

## 2023-02-27 ENCOUNTER — APPOINTMENT (OUTPATIENT)
Dept: INTERNAL MEDICINE | Facility: CLINIC | Age: 53
End: 2023-02-27

## 2023-02-27 ENCOUNTER — NON-APPOINTMENT (OUTPATIENT)
Age: 53
End: 2023-02-27

## 2023-03-01 ENCOUNTER — OUTPATIENT (OUTPATIENT)
Dept: OUTPATIENT SERVICES | Facility: HOSPITAL | Age: 53
LOS: 1 days | End: 2023-03-01
Payer: MEDICARE

## 2023-03-01 ENCOUNTER — APPOINTMENT (OUTPATIENT)
Dept: INTERNAL MEDICINE | Facility: CLINIC | Age: 53
End: 2023-03-01
Payer: MEDICARE

## 2023-03-01 DIAGNOSIS — I10 ESSENTIAL (PRIMARY) HYPERTENSION: ICD-10-CM

## 2023-03-01 PROCEDURE — G0463: CPT

## 2023-03-01 PROCEDURE — 99214 OFFICE O/P EST MOD 30 MIN: CPT | Mod: GE,95

## 2023-03-01 NOTE — END OF VISIT
[] : Resident [FreeTextEntry3] : 51yo F with complex med hx including recent MIKALA on CKD c/b volume overload. Recently d/nimco from hospital, has planned in-person f/u on [facility] but could not make today due to transport issues. Does have ENGLISH persistently, however no overt distress noted at this time. Given close in-person follow-up favor keeping her at home over ED eval. Cont current meds. ER precautions reviewed.

## 2023-03-01 NOTE — PHYSICAL EXAM
[No Acute Distress] : no acute distress [No Edema] : there was no peripheral edema [de-identified] : N [de-identified] : No respiratory distress apparent on the phone

## 2023-03-01 NOTE — REVIEW OF SYSTEMS
[Lower Ext Edema] : lower extremity edema [Negative] : Constitutional [Shortness Of Breath] : no shortness of breath [Wheezing] : no wheezing [Cough] : no cough [FreeTextEntry2] : N

## 2023-03-01 NOTE — ASSESSMENT
[FreeTextEntry1] : 51 y/o female with pmh of IDDM, Sarcoidosis, CKD, Seizure Disorder, and Hypercalcemia who presents to clinic for a telephonic visit for a hospital follow up.

## 2023-03-01 NOTE — HISTORY OF PRESENT ILLNESS
[Home] : at home, [unfilled] , at the time of the visit. [Medical Office: ([facility])___] : at the medical office located in  [Verbal consent obtained from patient] : the patient, [unfilled] [FreeTextEntry8] : \par Ms. Ortiz is a 53 y/o female with pmh of IDDM, Sarcoidosis, CKD, Seizure Disorder, and Hypercalcemia who presents to clinic for a telephonic visit for a hospital follow up. She was scheduled for an in person hospital follow up today however she was unable to get transportation to come to the clinic today therefore her appointment was converted to a telemedicine visit and she has an in person visit schedule for Monday on 3/1/23.\par \par The pt has had two hospitalizations in this year. From 1/25/23 to 1/31/23 the pt was at [facility] for bilateral lower extremity pain, during this visit she was noted to have an MIKALA on CKD with Cr of 4.9. Nephrology was consulted and the pt's torsemide and losartan were held in the hospitalization and after discharge as well. There was discussion that the pt may need RRT. Then the patient was noted to have SOB and bilateral lower extremity edema on 2/17, spoke with nephrology, and was encouraged to go to the ED for further evaluation. From 2/20/23 to 2/25/23 the pt was in the hospital and at this time was placed on a bumex gtt for volume overload and transitioned to bumex 2 mg BID, continuing this on discharge as well. The pt continues to take bumex 2 mg BID. Nephrology saw the patient in the hospital and the conversation of RRT was discussed as well. The pt was instructed to see a nephrologist outpatient for her CKD and endocrinology for her diabetes. \par \par Today the pt is stating that she has bilateral lower extremity edema and SOB when ambulating or exerting herself. She denies chest pain, nausea, or vomiting. She does not have a pulse oximetry at home, therefore was unable to tell me the range of her oxygen saturation.

## 2023-03-03 ENCOUNTER — APPOINTMENT (OUTPATIENT)
Dept: ENDOCRINOLOGY | Facility: CLINIC | Age: 53
End: 2023-03-03

## 2023-03-03 DIAGNOSIS — Z09 ENCOUNTER FOR FOLLOW-UP EXAMINATION AFTER COMPLETED TREATMENT FOR CONDITIONS OTHER THAN MALIGNANT NEOPLASM: ICD-10-CM

## 2023-03-04 NOTE — CHART NOTE - NSCHARTNOTEFT_GEN_A_CORE
Spoke w/ home care agency regarding pt albuterol inhaler. Pt did not require albuterol inhaler during hospitalization, but took as needed at home. Pt not currently wheezing or in emergent need of inhaler, currently breathing comfortably. Discussed w/ home care agency to advise pt to follow up with primary care provider to get refill of inhaler.

## 2023-03-06 ENCOUNTER — OUTPATIENT (OUTPATIENT)
Dept: OUTPATIENT SERVICES | Facility: HOSPITAL | Age: 53
LOS: 1 days | End: 2023-03-06
Payer: MEDICARE

## 2023-03-06 ENCOUNTER — APPOINTMENT (OUTPATIENT)
Dept: INTERNAL MEDICINE | Facility: CLINIC | Age: 53
End: 2023-03-06
Payer: MEDICARE

## 2023-03-06 VITALS
HEIGHT: 61 IN | HEART RATE: 106 BPM | SYSTOLIC BLOOD PRESSURE: 116 MMHG | OXYGEN SATURATION: 97 % | DIASTOLIC BLOOD PRESSURE: 60 MMHG | WEIGHT: 110 LBS | BODY MASS INDEX: 20.77 KG/M2

## 2023-03-06 DIAGNOSIS — K12.0 RECURRENT ORAL APHTHAE: ICD-10-CM

## 2023-03-06 DIAGNOSIS — R25.1 TREMOR, UNSPECIFIED: ICD-10-CM

## 2023-03-06 PROCEDURE — 84466 ASSAY OF TRANSFERRIN: CPT

## 2023-03-06 PROCEDURE — 82550 ASSAY OF CK (CPK): CPT

## 2023-03-06 PROCEDURE — 80053 COMPREHEN METABOLIC PANEL: CPT

## 2023-03-06 PROCEDURE — 82728 ASSAY OF FERRITIN: CPT

## 2023-03-06 PROCEDURE — 84443 ASSAY THYROID STIM HORMONE: CPT

## 2023-03-06 PROCEDURE — 99215 OFFICE O/P EST HI 40 MIN: CPT

## 2023-03-06 PROCEDURE — 85025 COMPLETE CBC W/AUTO DIFF WBC: CPT

## 2023-03-06 PROCEDURE — 80061 LIPID PANEL: CPT

## 2023-03-06 PROCEDURE — G0463: CPT

## 2023-03-06 PROCEDURE — 82607 VITAMIN B-12: CPT

## 2023-03-06 PROCEDURE — 83970 ASSAY OF PARATHORMONE: CPT

## 2023-03-06 PROCEDURE — 83036 HEMOGLOBIN GLYCOSYLATED A1C: CPT

## 2023-03-06 RX ORDER — TORSEMIDE 20 MG/1
20 TABLET ORAL DAILY
Qty: 90 | Refills: 2 | Status: COMPLETED | COMMUNITY
Start: 2023-02-15 | End: 2023-03-06

## 2023-03-07 ENCOUNTER — NON-APPOINTMENT (OUTPATIENT)
Age: 53
End: 2023-03-07

## 2023-03-07 DIAGNOSIS — I10 ESSENTIAL (PRIMARY) HYPERTENSION: ICD-10-CM

## 2023-03-07 LAB
ALBUMIN SERPL ELPH-MCNC: 3.5 G/DL
ALP BLD-CCNC: 42 U/L
ALT SERPL-CCNC: 12 U/L
ANION GAP SERPL CALC-SCNC: 19 MMOL/L
AST SERPL-CCNC: 9 U/L
BASOPHILS # BLD AUTO: 0.04 K/UL
BASOPHILS NFR BLD AUTO: 0.4 %
BILIRUB SERPL-MCNC: 0.3 MG/DL
BUN SERPL-MCNC: 108 MG/DL
CALCIUM SERPL-MCNC: 8.2 MG/DL
CALCIUM SERPL-MCNC: 8.2 MG/DL
CHLORIDE SERPL-SCNC: 88 MMOL/L
CHOLEST SERPL-MCNC: 338 MG/DL
CK SERPL-CCNC: 21 U/L
CO2 SERPL-SCNC: 27 MMOL/L
CREAT SERPL-MCNC: 7.49 MG/DL
EGFR: 6 ML/MIN/1.73M2
EOSINOPHIL # BLD AUTO: 0.52 K/UL
EOSINOPHIL NFR BLD AUTO: 5.8 %
ESTIMATED AVERAGE GLUCOSE: 203 MG/DL
FERRITIN SERPL-MCNC: 371 NG/ML
FOLATE SERPL-MCNC: >20 NG/ML
GLUCOSE SERPL-MCNC: 369 MG/DL
HBA1C MFR BLD HPLC: 8.7 %
HCT VFR BLD CALC: 24.6 %
HDLC SERPL-MCNC: 66 MG/DL
HGB BLD-MCNC: 8.3 G/DL
IMM GRANULOCYTES NFR BLD AUTO: 0.6 %
LDLC SERPL CALC-MCNC: 211 MG/DL
LYMPHOCYTES # BLD AUTO: 1.62 K/UL
LYMPHOCYTES NFR BLD AUTO: 18.2 %
MAN DIFF?: NORMAL
MCHC RBC-ENTMCNC: 28.4 PG
MCHC RBC-ENTMCNC: 33.7 GM/DL
MCV RBC AUTO: 84.2 FL
MONOCYTES # BLD AUTO: 0.66 K/UL
MONOCYTES NFR BLD AUTO: 7.4 %
NEUTROPHILS # BLD AUTO: 6.02 K/UL
NEUTROPHILS NFR BLD AUTO: 67.6 %
NONHDLC SERPL-MCNC: 272 MG/DL
PARATHYROID HORMONE INTACT: 275 PG/ML
PLATELET # BLD AUTO: 257 K/UL
POTASSIUM SERPL-SCNC: 5 MMOL/L
PROT SERPL-MCNC: 6.3 G/DL
RBC # BLD: 2.92 M/UL
RBC # FLD: 12 %
SODIUM SERPL-SCNC: 134 MMOL/L
TRANSFERRIN SERPL-MCNC: 177 MG/DL
TRIGL SERPL-MCNC: 304 MG/DL
TSH SERPL-ACNC: 1.68 UIU/ML
VIT B12 SERPL-MCNC: 849 PG/ML
WBC # FLD AUTO: 8.91 K/UL

## 2023-03-08 ENCOUNTER — NON-APPOINTMENT (OUTPATIENT)
Age: 53
End: 2023-03-08

## 2023-03-09 ENCOUNTER — NON-APPOINTMENT (OUTPATIENT)
Age: 53
End: 2023-03-09

## 2023-03-09 NOTE — PLAN
[FreeTextEntry1] : Patient is a 52 F with PMHx of CKD V, Uncontrolled Type 2 Diabetes Mellitus, HLD, HTN, Sarcoidosis presents for follow-up following recent hospitalization.\par \par #CKD V\par -C/w Bumex 2mg BID, currently euvolemic\par -C/w Sevalemer and Sodium Bicarb\par -F/u CMP, if worsening BUN/Cr or urgent electrolyte derangements, will reach out to Nephrology if patient needs expedite starting dialysis, either inpatient or outpatient\par -Will reach out Healthy Transitions program with [facility] to assist patient/family getting more information on starting dialysis\par \par #Type 2 Diabetes\par -F/u A1C\par -Patient to continue Basaglar 27 Units at bedtime, Novolog (8 Units if low PO intake, 10 Units if full meal)\par -Counselled patient to start Trulicity to improve glycemic control\par -Tasked Pulm if patient can be tapered down from prednisone and started on steroid sparing therapy to improve glycemic control\par -Counselled patient and family to write down sugars fasting and before and after meals. Patient came late so was unable to look at FreeStyle on computer with Dr. White, will plan to do so at next visit.\par \par #Odynophagia\par -Noted poor dentition, possible apthous ulcer or white plaque at back of throat\par -Chlorhexidine mouth wash, encourage improved dental hygiene\par -Patient encouraged to change texture of foods to increase PO intake (soft texture, soups, Glucerna shakes)\par -ENT referral\par \par #Tremors\par -Will send CBC, CMP, iron studies, B12, folate, PTH, CK for possible metabolic causes\par -If nothing seen and not resolved, patient given Neurology referral\par \par #Social Needs\par -Wrote Letter of Medical Necessity to extend home care hours\par -Working with [facility] to write scripts for shower chair, etc (recommended for PT). \par -[facility] to work on setting up patient with [facility] to improve social support, better transportation. Daughter Mc as alternate contact\par -At next visit, will try to connect patient to Dr. Colleti to assist with pt's obvious anxiety and difficulty with navigating the health system\par \par RTC 5 weeks

## 2023-03-09 NOTE — HISTORY OF PRESENT ILLNESS
[Post-hospitalization from ___ Hospital] : Post-hospitalization from [unfilled] Hospital [Patient Contacted By: ____] : and contacted by [unfilled] [FreeTextEntry2] : Patient is a 52 F with PMHx of CKD V, Uncontrolled Type 2 Diabetes Mellitus, HLD, HTN, Sarcoidosis presents for follow-up following recent hospitalization. Daughter Mc in visit. \par \par Was hospitalized at [facility] on 2/20/23 to 2/25/23 after complaints of SOB and worsening volume overload at home. Was started on bumex gtt for volume overload and transitioned to bumex 2 mg BID, continuing this on discharge as well. Nephrology saw the patient in the hospital and the conversation of RRT was discussed as well. The pt was instructed to see a nephrologist outpatient for her CKD and endocrinology for her diabetes. \par \par Today, patient states her breathing and LE swelling has improved. Now has complaints of pain with swallowing, resulting in decreased PO intake. Patient states she is able to swallow liquids and solids without difficulty. Has poor dentition with several missing teeth and recently mistakenly biting her inner cheeks causing sores. Also endorsing involuntary tremors in her arms following discharge from the hospital, now reducing. Patient conscious throughout this time. No LOC, falls, seizure like activity. \par \par Regarding her DM, patient checks her sugars fasting, before meals and whenever she feels unwell. Does not have sugars written down with her. In AM, her sugars are usually in the 200s. However, due to her reduced and often inconsistent PO intake, her sugars fluctuate to as high as 300s and low as 90s. No longer having sugars below 70s after reducing Novolog to 8 units if low PO intake. Patient only eating fruits such as strawberries and bananas due to pain with eating. Patient taking Novolog 10 units premeal (reducing to 8 Units if not having a full meal) and Basaglar 27 Units. Not taking Trulicity for a few months because she was scared of injecting herself with new medication.\par \par Regarding her CKD, patient and family does not fully understand the severity of her disease or what options they may have regarding renal replacement therapy. Very overwhelmed and anxious with idea of patient likely needing dialysis. Also understand patient has several complex medical problems and need more help at home.  \par

## 2023-03-09 NOTE — PHYSICAL EXAM
[Normal Sclera/Conjunctiva] : normal sclera/conjunctiva [PERRL] : pupils equal round and reactive to light [Pedal Pulses Present] : the pedal pulses are present [No Edema] : there was no peripheral edema [Normal] : no rash [No Focal Deficits] : no focal deficits [Normal Gait] : normal gait [Alert and Oriented x3] : oriented to person, place, and time [de-identified] : weak appearing, thin, NAD [de-identified] : poor dentition, possible white plaques in back of throat, flat white lesions in inner cheeks (unclear if apthous ulcers or lesions from biting cheeks) [de-identified] : Anxious, tangential speech [36662 - Moderate Complexity requires multiple possible diagnoses and/or the management options, moderate complexity of the medical data (tests, etc.) to be reviewed, and moderate risk of significant complications, morbidity, and/or mortality as well as co] : Moderate Complexity

## 2023-03-10 DIAGNOSIS — K12.0 RECURRENT ORAL APHTHAE: ICD-10-CM

## 2023-03-10 DIAGNOSIS — D50.9 IRON DEFICIENCY ANEMIA, UNSPECIFIED: ICD-10-CM

## 2023-03-10 DIAGNOSIS — R25.1 TREMOR, UNSPECIFIED: ICD-10-CM

## 2023-03-15 ENCOUNTER — LABORATORY RESULT (OUTPATIENT)
Age: 53
End: 2023-03-15

## 2023-03-17 ENCOUNTER — RX RENEWAL (OUTPATIENT)
Age: 53
End: 2023-03-17

## 2023-03-23 ENCOUNTER — APPOINTMENT (OUTPATIENT)
Dept: INTERNAL MEDICINE | Facility: CLINIC | Age: 53
End: 2023-03-23
Payer: MEDICARE

## 2023-03-23 DIAGNOSIS — N18.9 CHRONIC KIDNEY DISEASE, UNSPECIFIED: ICD-10-CM

## 2023-03-23 PROCEDURE — 99213 OFFICE O/P EST LOW 20 MIN: CPT | Mod: GE,95

## 2023-03-24 NOTE — PHYSICAL EXAM
[No Acute Distress] : no acute distress [Well-Appearing] : well-appearing [de-identified] : Physical exam limited, as this was a TEB visit

## 2023-03-24 NOTE — HISTORY OF PRESENT ILLNESS
[Home] : at home, [unfilled] , at the time of the visit. [Medical Office: ([facility])___] : at the medical office located in  [Verbal consent obtained from patient] : the patient, [unfilled] [Family Member] : family member [FreeTextEntry1] : need for HD [de-identified] : 52 F with PMHx of CKD V, Uncontrolled Type 2 Diabetes Mellitus, HLD, HTN, Sarcoidosis presents for follow-up regarding need for HD. Discussed with patient and daughters regarding why HD was necessary, that CKD was worsening, and that patient needs to make decision soon regarding HD initiation. Explained that given rapidly worsening CKD, the options available now are HD or possible hospice.\par \par Patient stated that she was anxious about "everything," especially regarding preparation for HD (AVF placement, etc). Explained that sedation during AVF should be possible. She then handed over the phone to her daughters, who were not convinced that her kidney function was rapidly deteriorating (given that most recent Cr downtrended). However, I listed our her Cr values over the past months/years to demonstrate that her Kidneys were indeed worsening, and that urgent need for HD was likely imminent. Patient and daughters stated that they would still like time to think about it and plan to follow up with Nephrology next week.

## 2023-03-24 NOTE — ASSESSMENT
[FreeTextEntry1] : 52 F with PMHx of CKD V, Uncontrolled Type 2 Diabetes Mellitus, HLD, HTN, Sarcoidosis presents for follow-up regarding need for HD. \par \par #Need for HD \par -Had nearly hour long TEB visit - discussion above. Explained that our interprofessional team is trying to provide her with knowledge of her current medical status so that she can make an informed decision that is best for her and her family. Explained in layman's terms how her kidneys function, why HD was necessary, how she can continue to spend time with her loved ones even after initiation of dialysis, how not having HD would lead to eventual death (possibly within the next few weeks), and what hospice entailed should she choose to not go through with HD. Also stated that sedation would be an option during AVF placement, if the anxiety would be overwhelming for her. At end of discussion, patient was still unable to make a decision. \par -Encouraged her to follow up with Nephrology next week, when she can have more info regarding HD\par -Emailed [facility] Program that patient still needs information about following points:\par  1)what exactly does life with HD entail? \par  2)What do HD sessions look like? Are there videos,pamphlets, etc available where she can see how it is       conducted?\par  3)Is there any pain during HD sessions?\par  4)She is looking for confirmation that sedation can be provided for all procedures necessary to initiate HD\par -Our team will continue to discuss at next CPE on 4/12\par \par

## 2023-03-29 ENCOUNTER — OUTPATIENT (OUTPATIENT)
Dept: OUTPATIENT SERVICES | Facility: HOSPITAL | Age: 53
LOS: 1 days | End: 2023-03-29

## 2023-03-29 ENCOUNTER — APPOINTMENT (OUTPATIENT)
Dept: NEPHROLOGY | Facility: CLINIC | Age: 53
End: 2023-03-29
Payer: MEDICARE

## 2023-03-29 VITALS
HEIGHT: 61 IN | OXYGEN SATURATION: 99 % | WEIGHT: 120 LBS | HEART RATE: 110 BPM | TEMPERATURE: 97.5 F | RESPIRATION RATE: 17 BRPM | BODY MASS INDEX: 22.66 KG/M2 | DIASTOLIC BLOOD PRESSURE: 70 MMHG | SYSTOLIC BLOOD PRESSURE: 138 MMHG

## 2023-03-29 DIAGNOSIS — R80.9 PROTEINURIA, UNSPECIFIED: ICD-10-CM

## 2023-03-29 DIAGNOSIS — I10 ESSENTIAL (PRIMARY) HYPERTENSION: ICD-10-CM

## 2023-03-29 DIAGNOSIS — N18.5 CHRONIC KIDNEY DISEASE, STAGE 5: ICD-10-CM

## 2023-03-29 DIAGNOSIS — R60.0 LOCALIZED EDEMA: ICD-10-CM

## 2023-03-29 PROCEDURE — 99214 OFFICE O/P EST MOD 30 MIN: CPT | Mod: GC

## 2023-03-29 NOTE — HISTORY OF PRESENT ILLNESS
[FreeTextEntry1] : 52-year-old female with longstanding history of insulin-dependent DM, sarcoidosis (biopsy proven in 2010), CKD, proteinuria, seizure disorder and hypercalcemia presented for follow-up visit. Pt. was seen in clinic last week on 2/15/23.\par \par Kidney history: Pt. with previous history of MIKALA in the setting of hypercalcemia. Pt. was hospitalized at [facility] from 9/25/17 to 10/3/17. At that time, the patient was admitted with Scr of 5.65 and serum calcium of 15.3 on 09/25/17. Scr improved to 3.48 and serum calcium decreased to 10.7 on 10/03/17. Scr improved 1.44 and serum calcium was WNL at 10.1 on labs done on 9/24/18. Scr remained stable at 1.41 on 8/9/2021. Scr increased to 3.36 on 9/12/22. Pt with history of long standing uncontrolled DM. Scr was 2.95 on labs done on 11/1/22. Pt was initiated on PO Torsemide 10 mg once daily for B/L LE edema. Scr increased to 4.19 on labs done during clinic visit on 1/11/23. Torsemide dose was decreased to 5 mg once daily in view of worsening Scr and resolution of LE swelling. Pt. was admitted to [facility] (1/25/23-1/31/23) for worsening B/L LE chronic pain. Scr was elevated at 4.9 on 1/25/23 and remained elevated at 4.7 on 1/30/23. Pt. was seen by our inpatient nephrology team. Torsemide and Losartan were held during hospitalization and upon discharge. Pt. reported worsening of LE swelling with feeling of intermittent SOB after ~10 days of discharge from [facility]. Pt. was advised to initiate PO Torsemide 10 mg  once daily on 2/13/23. Pt. developed worsening LE swelling and SOB and was subsequently admitted at [facility] (2/20/23-2/25/23). Scr was elevated at 5.15 on 2/20/23 and remained elevated at 6.27 on 2/25/23. Pt was seen was seen by our inpatient nephrology team at that time. Pt. received Bumex infusion and was later transitioned to PO Bumex 2 mg twice daily upon discharge. Pt. was advised to initiate long term RRT at that time, however pt refused. Last Scr was elevated at 6.96 on 3/15/23.  \par \par Pt. gives history of worsening B/L LE swelling. Pt. also reports chronic B/L LE pain. No fever, CP, SOB, HA or dizziness during clinic visit today.

## 2023-03-29 NOTE — PHYSICAL EXAM
[General Appearance - Alert] : alert [General Appearance - In No Acute Distress] : in no acute distress [General Appearance - Well Nourished] : well nourished [Sclera] : the sclera and conjunctiva were normal [Outer Ear] : the ears and nose were normal in appearance [Jugular Venous Distention Increased] : there was no jugular-venous distention [Respiration, Rhythm And Depth] : normal respiratory rhythm and effort [Auscultation Breath Sounds / Voice Sounds] : lungs were clear to auscultation bilaterally [Heart Sounds] : normal S1 and S2 [Heart Sounds Gallop] : no gallops [Heart Sounds Pericardial Friction Rub] : no pericardial rub [Abdomen Soft] : soft [Abdomen Tenderness] : non-tender [No CVA Tenderness] : no ~M costovertebral angle tenderness [Nail Clubbing] : no clubbing  or cyanosis of the fingernails [] : no rash [Oriented To Time, Place, And Person] : oriented to person, place, and time [Affect] : the affect was normal [Mood] : the mood was normal [Abnormal Walk] : normal gait [FreeTextEntry1] : No asterixis seen (on outstretched hands)

## 2023-03-29 NOTE — END OF VISIT
[FreeTextEntry3] : I was physically present for the key portions of the evaluation and management (E/M) service provided. I agree with the above history, ROS, physical exam, assessment and plan which I have reviewed and edited where appropriate. I have also reviewed the assessment and management of CKD, HTN and LE edema with the patient during clinic visit today.\par \par Pt. with advanced CKD stage 5. Recent Scr significantly elevated at 6.96 (3/15/23). Check renal panel today. Pt. agreeable to initiate long-term HD at [facility] next week. Avoid nephrotoxins.

## 2023-03-29 NOTE — REVIEW OF SYSTEMS
[Recent Weight Gain (___ Lbs)] : recent [unfilled] ~Ulb weight gain [Lower Ext Edema] : lower extremity edema [Limb Swelling] : limb swelling [As Noted in HPI] : as noted in HPI [Negative] : Heme/Lymph [Feeling Tired] : feeling tired [Fever] : no fever [Eye Pain] : no eye pain [Eyesight Problems] : no eyesight problems [Earache] : no earache [Sore Throat] : no sore throat [Heart Rate Is Slow] : the heart rate was not slow [Heart Rate Is Fast] : the heart rate was not fast [Chest Pain] : no chest pain [Palpitations] : no palpitations [Shortness Of Breath] : no shortness of breath [Cough] : no cough [Abdominal Pain] : no abdominal pain [Vomiting] : no vomiting [Dysuria] : no dysuria [Incontinence] : no incontinence [Arthralgias] : no arthralgias [Skin Lesions] : no skin lesions [Skin Wound] : no skin wound [Confused] : no confusion [Convulsions] : no convulsions [Depression] : no depression [Muscle Weakness] : no muscle weakness [FreeTextEntry2] : occasionally [FreeTextEntry5] : B/L LE edema [FreeTextEntry9] : B/L LE edema [de-identified] : chronic LE neuropathy

## 2023-03-29 NOTE — ASSESSMENT
[FreeTextEntry1] : 1. CKD Stage 5/Proteinuria: Pt. with advanced/progressive CKD in setting of uncontrolled DM. Pt. with most likely diabetic nephropathy. Recent Scr was significantly elevated at 6.96 on 3/15/23. Pt. currently enrolled in [facility] in CKD program. Pt. was advised to initiate long-term HD earlier this month however pt. refused. Various options for RRT including HD, PD and kidney transplantation were reviewed again with patient and her daughter during clinic visit today. Spoke with patient in length regarding need to initiate long-term dialysis. Pt. agreeable to go to [facility] on Monday, 4/3/23 for initiation of long-term HD. Check renal panel today. Avoid NSAIDs/nephrotoxins. \par  \par 2. HTN: BP in acceptable range during clinic visit today. Continue with current BP meds. Low salt diet advised. Monitor BP daily at home. \par \par 3. LE edema: Pt with significant B/L LE swelling on exam today. Pt. currently on oral Bumex 2 mg BID. Pt advised to take oral Bumex 2 mg TID. Low salt diet and fluid restriction advised. Monitor body weight.  \par \par Follow-up pending review of lab results.

## 2023-03-30 ENCOUNTER — NON-APPOINTMENT (OUTPATIENT)
Age: 53
End: 2023-03-30

## 2023-03-30 LAB
ALBUMIN SERPL ELPH-MCNC: 3.3 G/DL
ALBUMIN SERPL ELPH-MCNC: 3.6 G/DL
ANION GAP SERPL CALC-SCNC: 12 MMOL/L
ANION GAP SERPL CALC-SCNC: 18 MMOL/L
BUN SERPL-MCNC: 51 MG/DL
BUN SERPL-MCNC: 70 MG/DL
CALCIUM SERPL-MCNC: 8.2 MG/DL
CALCIUM SERPL-MCNC: 8.6 MG/DL
CHLORIDE SERPL-SCNC: 102 MMOL/L
CHLORIDE SERPL-SCNC: 99 MMOL/L
CO2 SERPL-SCNC: 20 MMOL/L
CO2 SERPL-SCNC: 21 MMOL/L
CREAT SERPL-MCNC: 4.19 MG/DL
CREAT SERPL-MCNC: 4.97 MG/DL
CREAT SPEC-SCNC: 37 MG/DL
CREAT/PROT UR: 11.7 RATIO
EGFR: 10 ML/MIN/1.73M2
EGFR: 12 ML/MIN/1.73M2
GLUCOSE SERPL-MCNC: 326 MG/DL
GLUCOSE SERPL-MCNC: 371 MG/DL
PHOSPHATE SERPL-MCNC: 6.1 MG/DL
PHOSPHATE SERPL-MCNC: 6.2 MG/DL
POTASSIUM SERPL-SCNC: 4.9 MMOL/L
POTASSIUM SERPL-SCNC: 5.1 MMOL/L
PROT UR-MCNC: 429 MG/DL
SODIUM SERPL-SCNC: 134 MMOL/L
SODIUM SERPL-SCNC: 137 MMOL/L

## 2023-04-05 ENCOUNTER — OUTPATIENT (OUTPATIENT)
Dept: OUTPATIENT SERVICES | Facility: HOSPITAL | Age: 53
LOS: 1 days | End: 2023-04-05

## 2023-04-05 ENCOUNTER — APPOINTMENT (OUTPATIENT)
Dept: NEPHROLOGY | Facility: CLINIC | Age: 53
End: 2023-04-05
Payer: MEDICARE

## 2023-04-05 VITALS
RESPIRATION RATE: 18 BRPM | DIASTOLIC BLOOD PRESSURE: 78 MMHG | WEIGHT: 119 LBS | HEART RATE: 104 BPM | BODY MASS INDEX: 22.47 KG/M2 | OXYGEN SATURATION: 98 % | SYSTOLIC BLOOD PRESSURE: 142 MMHG | HEIGHT: 61 IN | TEMPERATURE: 96.5 F

## 2023-04-05 VITALS — DIASTOLIC BLOOD PRESSURE: 78 MMHG | SYSTOLIC BLOOD PRESSURE: 134 MMHG

## 2023-04-05 DIAGNOSIS — R80.9 PROTEINURIA, UNSPECIFIED: ICD-10-CM

## 2023-04-05 DIAGNOSIS — N18.5 CHRONIC KIDNEY DISEASE, STAGE 5: ICD-10-CM

## 2023-04-05 DIAGNOSIS — I10 ESSENTIAL (PRIMARY) HYPERTENSION: ICD-10-CM

## 2023-04-05 DIAGNOSIS — R60.0 LOCALIZED EDEMA: ICD-10-CM

## 2023-04-05 LAB
ALBUMIN SERPL ELPH-MCNC: 3.8 G/DL
ANION GAP SERPL CALC-SCNC: 15 MMOL/L
BUN SERPL-MCNC: 56 MG/DL
CALCIUM SERPL-MCNC: 8.2 MG/DL
CHLORIDE SERPL-SCNC: 98 MMOL/L
CO2 SERPL-SCNC: 24 MMOL/L
CREAT SERPL-MCNC: 6.38 MG/DL
EGFR: 7 ML/MIN/1.73M2
GLUCOSE SERPL-MCNC: 327 MG/DL
PHOSPHATE SERPL-MCNC: 5.4 MG/DL
POTASSIUM SERPL-SCNC: 5.5 MMOL/L
SODIUM SERPL-SCNC: 137 MMOL/L

## 2023-04-05 PROCEDURE — 99214 OFFICE O/P EST MOD 30 MIN: CPT | Mod: GC

## 2023-04-05 NOTE — PHYSICAL EXAM
[General Appearance - Alert] : alert [General Appearance - In No Acute Distress] : in no acute distress [General Appearance - Well Nourished] : well nourished [Sclera] : the sclera and conjunctiva were normal [Outer Ear] : the ears and nose were normal in appearance [Jugular Venous Distention Increased] : there was no jugular-venous distention [Respiration, Rhythm And Depth] : normal respiratory rhythm and effort [Auscultation Breath Sounds / Voice Sounds] : lungs were clear to auscultation bilaterally [Heart Sounds] : normal S1 and S2 [Heart Sounds Gallop] : no gallops [Heart Sounds Pericardial Friction Rub] : no pericardial rub [Abdomen Soft] : soft [Abdomen Tenderness] : non-tender [No CVA Tenderness] : no ~M costovertebral angle tenderness [Abnormal Walk] : normal gait [Nail Clubbing] : no clubbing  or cyanosis of the fingernails [] : no rash [Oriented To Time, Place, And Person] : oriented to person, place, and time [Affect] : the affect was normal [Mood] : the mood was normal [FreeTextEntry1] : No asterixis seen (on outstretched hands)

## 2023-04-05 NOTE — HISTORY OF PRESENT ILLNESS
[FreeTextEntry1] : 52-year-old female with longstanding history of insulin-dependent DM, sarcoidosis (biopsy proven in 2010), CKD, proteinuria, seizure disorder and hypercalcemia presented for follow-up visit. Pt. was seen in clinic last week on 3/29/23. \par \par Kidney history: Pt. with previous history of MIKALA in the setting of hypercalcemia. Pt. was hospitalized at [facility] from 9/25/17 to 10/3/17. At that time, the patient was admitted with Scr of 5.65 and serum calcium of 15.3 on 09/25/17. Scr improved to 3.48 and serum calcium decreased to 10.7 on 10/03/17. Scr improved 1.44 and serum calcium was WNL at 10.1 on labs done on 9/24/18. Scr remained stable at 1.41 on 8/9/2021. Scr increased to 3.36 on 9/12/22. Pt with history of long standing uncontrolled DM. Scr was 2.95 on labs done on 11/1/22. Pt was initiated on PO Torsemide 10 mg once daily for B/L LE edema. Scr increased to 4.19 on labs done during clinic visit on 1/11/23. Torsemide dose was decreased to 5 mg once daily in view of worsening Scr and resolution of LE swelling. Pt. was admitted to [facility] (1/25/23-1/31/23) for worsening B/L LE chronic pain. Scr was elevated at 4.9 on 1/25/23 and remained elevated at 4.7 on 1/30/23. Pt. was seen by our inpatient nephrology team. Torsemide and Losartan were held during hospitalization and upon discharge. Pt. reported worsening of LE swelling with feeling of intermittent SOB after ~10 days of discharge from [facility]. Pt. was advised to initiate PO Torsemide 10 mg  once daily on 2/13/23. Pt. developed worsening LE swelling and SOB and was subsequently admitted at [facility] (2/20/23-2/25/23). Scr was elevated at 5.15 on 2/20/23 and remained elevated at 6.27 on 2/25/23. Pt was seen was seen by our inpatient nephrology team at that time. Pt. received Bumex infusion and was later transitioned to PO Bumex 2 mg twice daily upon discharge. Pt. was advised to initiate long term RRT at that time, however pt refused. Pt. with significant b/L LE swelling on exam during clinic visit on 3/29/23. PO Bumex was increased to 2 mg TID.  Last Scr decreased to 4.97 at  on 3/29/23.  \par \par Pt. says her B/L LE swelling has slightly improved. Pt. also reports chronic B/L LE pain. No fever, CP, SOB, HA or dizziness during clinic visit today.

## 2023-04-05 NOTE — END OF VISIT
[FreeTextEntry3] : I was physically present for the key portions of the evaluation and management (E/M) service provided. I agree with the above history, ROS, physical exam, assessment and plan which I have reviewed and edited where appropriate. I have also reviewed the assessment and management of CKD, HTN and LE edema with the patient during clinic visit today.\par \par Pt. with advanced CKD stage 5. Last Scr elevated at 4.97 on 3/29/23. Check renal panel today. Pt. with significant B/L LE edema, continue with oral diuretic therapy. Avoid nephrotoxins.

## 2023-04-05 NOTE — ASSESSMENT
[FreeTextEntry1] : 1. CKD Stage 5/Proteinuria: Pt. with advanced/progressive CKD in setting of uncontrolled DM. Pt. with most likely diabetic nephropathy. Scr was significantly elevated at 6.96 on 3/15/23. Last Scr was elevated but decreased to 4.97 on 3/29/23. Pt. currently enrolled in [facility] in CKD program. Pt. was advised to initiate long-term HD last month however pt. refused. Various options for RRT including HD, PD and kidney transplantation were reviewed again with patient and her daughter during last clinic visit. Check renal panel today. Avoid NSAIDs/nephrotoxins. \par  \par 2. HTN: Repeat BP reading in acceptable range during clinic visit today. Continue with current BP meds. Low salt diet advised. Monitor BP daily at home. \par \par 3. LE edema: Pt with persistent/significant B/L LE swelling on exam today. Pt. currently on oral Bumex 2 mg TID. Will consider adding PO Metolazone pending review of labs results from today.  Low salt diet and fluid restriction advised. Monitor body weight.  \par \par Follow-up: 1 week.

## 2023-04-05 NOTE — REVIEW OF SYSTEMS
[Feeling Tired] : feeling tired [Recent Weight Gain (___ Lbs)] : recent [unfilled] ~Ulb weight gain [Lower Ext Edema] : lower extremity edema [Limb Swelling] : limb swelling [As Noted in HPI] : as noted in HPI [Negative] : Heme/Lymph [Fever] : no fever [Eye Pain] : no eye pain [Eyesight Problems] : no eyesight problems [Earache] : no earache [Sore Throat] : no sore throat [Heart Rate Is Slow] : the heart rate was not slow [Heart Rate Is Fast] : the heart rate was not fast [Chest Pain] : no chest pain [Palpitations] : no palpitations [Shortness Of Breath] : no shortness of breath [Cough] : no cough [Abdominal Pain] : no abdominal pain [Vomiting] : no vomiting [Dysuria] : no dysuria [Incontinence] : no incontinence [Arthralgias] : no arthralgias [Skin Lesions] : no skin lesions [Skin Wound] : no skin wound [Confused] : no confusion [Convulsions] : no convulsions [Depression] : no depression [Muscle Weakness] : no muscle weakness [FreeTextEntry2] : occasionally [FreeTextEntry5] : B/L LE edema [FreeTextEntry9] : B/L LE edema [de-identified] : chronic LE neuropathy

## 2023-04-06 ENCOUNTER — NON-APPOINTMENT (OUTPATIENT)
Age: 53
End: 2023-04-06

## 2023-04-12 ENCOUNTER — OUTPATIENT (OUTPATIENT)
Dept: OUTPATIENT SERVICES | Facility: HOSPITAL | Age: 53
LOS: 1 days | End: 2023-04-12
Payer: MEDICARE

## 2023-04-12 ENCOUNTER — APPOINTMENT (OUTPATIENT)
Dept: NEPHROLOGY | Facility: CLINIC | Age: 53
End: 2023-04-12

## 2023-04-12 ENCOUNTER — APPOINTMENT (OUTPATIENT)
Dept: INTERNAL MEDICINE | Facility: CLINIC | Age: 53
End: 2023-04-12
Payer: MEDICARE

## 2023-04-12 VITALS
DIASTOLIC BLOOD PRESSURE: 78 MMHG | SYSTOLIC BLOOD PRESSURE: 120 MMHG | HEIGHT: 61 IN | OXYGEN SATURATION: 98 % | BODY MASS INDEX: 21.71 KG/M2 | WEIGHT: 115 LBS | HEART RATE: 98 BPM

## 2023-04-12 DIAGNOSIS — I10 ESSENTIAL (PRIMARY) HYPERTENSION: ICD-10-CM

## 2023-04-12 PROCEDURE — 99214 OFFICE O/P EST MOD 30 MIN: CPT

## 2023-04-12 PROCEDURE — G0463: CPT

## 2023-04-12 RX ORDER — CHLORHEXIDINE GLUCONATE, 0.12% ORAL RINSE 1.2 MG/ML
0.12 SOLUTION DENTAL
Qty: 900 | Refills: 0 | Status: COMPLETED | COMMUNITY
Start: 2023-03-06 | End: 2023-04-12

## 2023-04-12 RX ORDER — TRAMADOL HYDROCHLORIDE 50 MG/1
50 TABLET, COATED ORAL
Qty: 21 | Refills: 0 | Status: COMPLETED | COMMUNITY
Start: 2022-10-26 | End: 2023-04-12

## 2023-04-12 RX ORDER — CARVEDILOL 12.5 MG/1
12.5 TABLET, FILM COATED ORAL DAILY
Qty: 30 | Refills: 3 | Status: COMPLETED | COMMUNITY
Start: 2023-02-03 | End: 2023-04-12

## 2023-04-12 RX ORDER — LIDOCAINE 40 MG/G
4 PATCH TOPICAL
Qty: 90 | Refills: 2 | Status: COMPLETED | COMMUNITY
Start: 2022-09-12 | End: 2023-04-12

## 2023-04-12 NOTE — END OF VISIT
[FreeTextEntry3] : Pt w renal failure thus far refusing dialysis/transplant.\par Long discussion about risk to her life w pt and also  family members.\par iMportance of f/u w Renal, she missed today's appointmt.\par \par  [] : Resident

## 2023-04-12 NOTE — ASSESSMENT
[FreeTextEntry1] : Patient is a 52 F with PMHx of CKD V, Uncontrolled Type 2 Diabetes Mellitus, HLD, HTN, Sarcoidosis presents for follow-up regarding HD initiation.\par \par #CKDV\par -At this time, patient continues to refuse to initiating HD. Patient has full capacity and able to fully explain the current situation. Able to reiterate risks of waiting until an emergent situation rather than planning out HD at this time. Also knows if she changes her mind, she can go to the hospital or call the clinic for instructions.\par -Patient currently hypervolemic, but significantly improved from prior Nephrology appointment. Has clear lungs and no JVD but reiterated the risks of volume overload and respiratory compromise if she continues to refuse HD. Will continue on PO Bumex TID. Tasked HD if any further options for diuretics. \par -Will task Nephrology to reschedule patient for weekly blood draws/visits as well as further discussions for HD. Is being followed by [facility] team for possible HD initiation. \par -Patient refusing Palliative Care/Hospice as well as Behavioral Health visit with Dr. Colleti. States she has  with her family, her Ca[employer] michelle and recently started going to a [employer] with her nephew. \par \par #Goals of Care\par -As patient does not want palliative care/hospice, continuing discussion on code status and health care proxy. Explained in depth what CPR entails, what does DNR/DNI mean and that the default is full code and what that means. States she needs to discuss that with her family further. \par -Also explained the importance of her picking a health care proxy. Has had difficulty establishing one amongst her 6 children since they have difference of opinions on what to medically. Told patient she needs to establish someone to make medical decisions for her if she is not able to, which can be imminent given her medical conditions.  \par -Holding off on health care maintenance given active and severe medical conditions\par \par RTC 5 weeks

## 2023-04-12 NOTE — HISTORY OF PRESENT ILLNESS
[FreeTextEntry1] : Follow-up [de-identified] : Patient is a 52 F with PMHx of CKD V, Uncontrolled Type 2 Diabetes Mellitus, HLD, HTN, Sarcoidosis presents for follow-up.\par \par During last in-person and tele-visit in Medicine clinic, as well as Nephrology follow-up appointments, patient has had multiple discussions regarding starting dialysis. Emphasized to patient again that her Creatinine is rapidly worsening and that she needs to make a decision to either pursue HD or hospice care. \par \par Last appointment with Nephrology had significant lower extremity swelling. Was increased to Bumex 2 mg TID from BID. States she has significant improvement and is urinating frequently but states returns when she lays in bed in supine position. \par Patient was unable to make her appointment with Nephrology this morning due to transportation issues. She states since her creatinine downtrended two blood draws before, her kidneys are not as bad as she has been told and that there may be medications, foods she is eating or other outside influences that are causing the fluctuation in creatinine. She also states it is hard to believe are kidneys "are that bad" if she "feels well," meaning she is not short of breath or fatigued like she was prior to her last hospitalization.  Explained to patient although there are fluctuations in her creatinine on labwork, her overall trend shows her kidneys are worsening and end stage. Emphasized that pursuing dialysis can open the possibility of extending her life or pursuing transplantation in the future but refusing dialysis until she no longer feels well can lead to a medical emergency (ie. volume overload, electrolyte abnormalities) can lead to her dying before getting any life saving interventions. Patient states she does not want to pursue hospice or palliative care at this time.

## 2023-04-12 NOTE — PHYSICAL EXAM
[No JVD] : no jugular venous distention [Supple] : supple [Pedal Pulses Present] : the pedal pulses are present [No Extremity Clubbing/Cyanosis] : no extremity clubbing/cyanosis [Normal] : affect was normal and insight and judgment were intact [de-identified] : 1+ pitting edema to below knees

## 2023-04-13 DIAGNOSIS — N18.5 CHRONIC KIDNEY DISEASE, STAGE 5: ICD-10-CM

## 2023-04-13 DIAGNOSIS — R60.0 LOCALIZED EDEMA: ICD-10-CM

## 2023-04-19 ENCOUNTER — APPOINTMENT (OUTPATIENT)
Dept: NEPHROLOGY | Facility: CLINIC | Age: 53
End: 2023-04-19
Payer: MEDICARE

## 2023-04-19 ENCOUNTER — OUTPATIENT (OUTPATIENT)
Dept: OUTPATIENT SERVICES | Facility: HOSPITAL | Age: 53
LOS: 1 days | End: 2023-04-19

## 2023-04-19 VITALS
TEMPERATURE: 98.1 F | RESPIRATION RATE: 16 BRPM | OXYGEN SATURATION: 98 % | HEIGHT: 61 IN | WEIGHT: 109.25 LBS | DIASTOLIC BLOOD PRESSURE: 78 MMHG | SYSTOLIC BLOOD PRESSURE: 122 MMHG | BODY MASS INDEX: 20.62 KG/M2

## 2023-04-19 VITALS — HEART RATE: 90 BPM

## 2023-04-19 LAB
ALBUMIN SERPL ELPH-MCNC: 4 G/DL
ANION GAP SERPL CALC-SCNC: 20 MMOL/L
BUN SERPL-MCNC: 70 MG/DL
CALCIUM SERPL-MCNC: 8.4 MG/DL
CHLORIDE SERPL-SCNC: 88 MMOL/L
CO2 SERPL-SCNC: 23 MMOL/L
CREAT SERPL-MCNC: 7.73 MG/DL
EGFR: 6 ML/MIN/1.73M2
GLUCOSE SERPL-MCNC: 467 MG/DL
PHOSPHATE SERPL-MCNC: 6.8 MG/DL
POTASSIUM SERPL-SCNC: 5.1 MMOL/L
SODIUM SERPL-SCNC: 131 MMOL/L

## 2023-04-19 PROCEDURE — 99214 OFFICE O/P EST MOD 30 MIN: CPT | Mod: GC

## 2023-04-19 NOTE — HISTORY OF PRESENT ILLNESS
[FreeTextEntry1] : 52-year-old female with longstanding history of insulin-dependent DM, sarcoidosis (biopsy proven in 2010), CKD, proteinuria, seizure disorder and hypercalcemia presents for follow-up visit. Pt. was last seen in clinic on 4/5/23. \par \par Kidney history: Pt. with previous history of MIKALA in the setting of hypercalcemia. Pt. was hospitalized at [facility] from 9/25/17 to 10/3/17. At that time, the patient was admitted with Scr of 5.65 and serum calcium of 15.3 on 09/25/17. Scr improved to 3.48 and serum calcium decreased to 10.7 on 10/03/17. Scr improved 1.44 and serum calcium was WNL at 10.1 on labs done on 9/24/18. Scr remained stable at 1.41 on 8/9/2021. Scr increased to 3.36 on 9/12/22. Pt with history of long standing uncontrolled DM. Scr was 2.95 on labs done on 11/1/22. Pt was initiated on PO Torsemide 10 mg once daily for B/L LE edema. Scr increased to 4.19 on labs done during clinic visit on 1/11/23. Torsemide dose was decreased to 5 mg once daily in view of worsening Scr and resolution of LE swelling. Pt. was admitted to [facility] (1/25/23-1/31/23) for worsening B/L LE chronic pain. Scr was elevated at 4.9 on 1/25/23 and remained elevated at 4.7 on 1/30/23. Pt. was seen by our inpatient nephrology team. Torsemide and Losartan were held during hospitalization and upon discharge. Pt. reported worsening of LE swelling with feeling of intermittent SOB after ~10 days of discharge from [facility]. Pt. was advised to initiate PO Torsemide 10 mg once daily on 2/13/23. Pt. developed worsening LE swelling and SOB and was subsequently admitted at [facility] (2/20/23-2/25/23). Scr was elevated at 5.15 on 2/20/23 and remained elevated at 6.27 on 2/25/23. Pt was seen was seen by our inpatient nephrology team at that time. Pt. received Bumex infusion and was later transitioned to PO Bumex 2 mg twice daily upon discharge. Pt. was advised to initiate long term RRT at that time, however pt refused. Pt. with significant b/L LE swelling on exam during clinic visit on 3/29/23. PO Bumex was increased to 2 mg TID at that time. Labs done on 4/5/23 showed an elevated serum potassium level of 5.5 and an elevated Scr of 6.38. Pt. was advised to go to the ER for further evaluation and initiation of HD however refused to go to the ER. Pt. initiated on oral Metolazone 2.5 mg on 4/13/23 for worsening B/L leg edema.\par \par Pt. says her B/L LE swelling has significantly improved/resolved after initiation of metolazone. She admits to having a cough with some congestion since Friday and states her grandchildren have been ill with cold like symptoms. She has an adequate appetite but admits to not eating as much. She has noted increased urine output and has lost ~11 lbs since 3/29/23. No fever, CP, SOB, HA or dizziness during clinic visit today.

## 2023-04-19 NOTE — ASSESSMENT
[FreeTextEntry1] : 1. CKD Stage 5/Proteinuria: Pt. with advanced/progressive CKD in setting of uncontrolled DM. Pt. with most likely diabetic nephropathy. Scr was significantly elevated at 6.96 on 3/15/23. Last Scr elevated at 6.38 with a serum potassium of 5.5 on 4/5/23. Pt. was advised to go to the ER for further evaluation and initiation of HD. Pt. however did not go to ER as advised. Pt. currently enrolled in [facility] in CKD program. Various options for RRT including HD, PD and kidney transplantation have been reviewed with the patient and her daughter (several times) during previous clinic visits. Check renal panel today. Avoid NSAIDs/nephrotoxins. \par  \par 2. HTN: BP in acceptable range during clinic visit today. Continue with current BP meds. Low salt diet advised. Monitor BP daily at home. \par \par 3. LE edema: Pt. initiated on oral Metolazone 2.5 mg on 4/13/23 for worsening B/L leg edema. No LE edema on exam during clinic visit today. Pt. advised to decrease Bumex to 2mg BID and to continue with oral Metolazone 2.5mg BID. Low salt diet and fluid restriction advised. Monitor body weight. \par \par Follow-up: 2 weeks.

## 2023-04-19 NOTE — PHYSICAL EXAM
[General Appearance - Alert] : alert [General Appearance - In No Acute Distress] : in no acute distress [General Appearance - Well Nourished] : well nourished [Sclera] : the sclera and conjunctiva were normal [Outer Ear] : the ears and nose were normal in appearance [Jugular Venous Distention Increased] : there was no jugular-venous distention [Respiration, Rhythm And Depth] : normal respiratory rhythm and effort [Auscultation Breath Sounds / Voice Sounds] : lungs were clear to auscultation bilaterally [Heart Sounds] : normal S1 and S2 [Heart Sounds Gallop] : no gallops [Heart Sounds Pericardial Friction Rub] : no pericardial rub [Edema] : there was no peripheral edema [Abdomen Soft] : soft [Abdomen Tenderness] : non-tender [No CVA Tenderness] : no ~M costovertebral angle tenderness [Abnormal Walk] : normal gait [Nail Clubbing] : no clubbing  or cyanosis of the fingernails [] : no rash [Oriented To Time, Place, And Person] : oriented to person, place, and time [Affect] : the affect was normal [Mood] : the mood was normal [FreeTextEntry1] : No asterixis seen (on outstretched hands)

## 2023-04-19 NOTE — REVIEW OF SYSTEMS
[Feeling Tired] : feeling tired [Negative] : Heme/Lymph [As Noted in HPI] : as noted in HPI [Fever] : no fever [Eye Pain] : no eye pain [Eyesight Problems] : no eyesight problems [Earache] : no earache [Sore Throat] : no sore throat [Heart Rate Is Slow] : the heart rate was not slow [Heart Rate Is Fast] : the heart rate was not fast [Chest Pain] : no chest pain [Palpitations] : no palpitations [Lower Ext Edema] : no lower extremity edema [Shortness Of Breath] : no shortness of breath [Cough] : no cough [Abdominal Pain] : no abdominal pain [Vomiting] : no vomiting [Dysuria] : no dysuria [Arthralgias] : no arthralgias [Limb Swelling] : no limb swelling [Skin Lesions] : no skin lesions [Skin Wound] : no skin wound [Confused] : no confusion [Convulsions] : no convulsions [Depression] : no depression [Muscle Weakness] : no muscle weakness [FreeTextEntry2] : occasionally [FreeTextEntry5] : LE edema resolved with addition of oral Metolazone [de-identified] : chronic LE neuropathy

## 2023-04-19 NOTE — END OF VISIT
[FreeTextEntry3] : I was physically present for the key portions of the evaluation and management (E/M) service provided. I agree with the above history, ROS, physical exam, assessment and plan which I have reviewed and edited where appropriate. I have also reviewed the assessment and management of CKD, HTN and LE edema with the patient during clinic visit today.\par \par Pt. with advanced CKD stage 5. Last Scr elevated at 6.38 on 4/5/23. Check renal panel today. LE edema resolved with oral diuretic therapy. Low salt diet and fluid restriction advised. Avoid nephrotoxins.

## 2023-04-20 ENCOUNTER — NON-APPOINTMENT (OUTPATIENT)
Age: 53
End: 2023-04-20

## 2023-04-21 DIAGNOSIS — N18.5 CHRONIC KIDNEY DISEASE, STAGE 5: ICD-10-CM

## 2023-04-21 DIAGNOSIS — I10 ESSENTIAL (PRIMARY) HYPERTENSION: ICD-10-CM

## 2023-04-21 DIAGNOSIS — R60.0 LOCALIZED EDEMA: ICD-10-CM

## 2023-04-26 ENCOUNTER — APPOINTMENT (OUTPATIENT)
Dept: NEPHROLOGY | Facility: CLINIC | Age: 53
End: 2023-04-26

## 2023-05-03 ENCOUNTER — OUTPATIENT (OUTPATIENT)
Dept: OUTPATIENT SERVICES | Facility: HOSPITAL | Age: 53
LOS: 1 days | End: 2023-05-03

## 2023-05-03 ENCOUNTER — APPOINTMENT (OUTPATIENT)
Dept: NEPHROLOGY | Facility: CLINIC | Age: 53
End: 2023-05-03
Payer: MEDICARE

## 2023-05-03 VITALS
WEIGHT: 110 LBS | DIASTOLIC BLOOD PRESSURE: 78 MMHG | RESPIRATION RATE: 17 BRPM | HEIGHT: 61 IN | TEMPERATURE: 97.1 F | HEART RATE: 110 BPM | OXYGEN SATURATION: 98 % | SYSTOLIC BLOOD PRESSURE: 132 MMHG | BODY MASS INDEX: 20.77 KG/M2

## 2023-05-03 PROCEDURE — 99214 OFFICE O/P EST MOD 30 MIN: CPT | Mod: GC

## 2023-05-03 NOTE — REVIEW OF SYSTEMS
[As Noted in HPI] : as noted in HPI [Negative] : Heme/Lymph [Fever] : no fever [Feeling Tired] : not feeling tired [Eye Pain] : no eye pain [Eyesight Problems] : no eyesight problems [Earache] : no earache [Sore Throat] : no sore throat [Heart Rate Is Slow] : the heart rate was not slow [Heart Rate Is Fast] : the heart rate was not fast [Chest Pain] : no chest pain [Palpitations] : no palpitations [Lower Ext Edema] : no lower extremity edema [Shortness Of Breath] : no shortness of breath [Cough] : no cough [Abdominal Pain] : no abdominal pain [Vomiting] : no vomiting [Dysuria] : no dysuria [Arthralgias] : no arthralgias [Limb Swelling] : no limb swelling [Skin Lesions] : no skin lesions [Skin Wound] : no skin wound [Confused] : no confusion [Convulsions] : no convulsions [Depression] : no depression [Muscle Weakness] : no muscle weakness [FreeTextEntry2] : decreased appetite [FreeTextEntry5] : LE edema resolved with addition of oral Metolazone [de-identified] : chronic LE neuropathy

## 2023-05-03 NOTE — END OF VISIT
[FreeTextEntry3] : I was physically present for the key portions of the evaluation and management (E/M) service provided. I agree with the above history, ROS, physical exam, assessment and plan which I have reviewed and edited where appropriate. I have also reviewed the assessment and management of CKD, HTN and LE edema with the patient during clinic visit today.\par \par Pt. with advanced CKD stage 5. Last Scr elevated at 7.73 on 4/5/23. Pt. continues to refuse HD initiation. Check renal panel today. LE edema resolved with oral diuretic therapy. Low salt diet and fluid restriction advised. Avoid nephrotoxins.

## 2023-05-03 NOTE — ASSESSMENT
[FreeTextEntry1] : 1. CKD Stage 5/Proteinuria: Pt. with advanced/progressive CKD in setting of uncontrolled DM. Pt. with most likely diabetic nephropathy. Last Scr significantly elevated at 7.73 on 4/19/23.. Pt. was advised to go to the ER for further evaluation and initiation of HD. Pt. however did not go to ER as advised. Pt. continues to refuse HD initiation. Pt. currently enrolled in [facility] in CKD program. Various options for RRT including HD, PD and kidney transplantation have been reviewed with the patient and her daughter (several times) during previous clinic visits. Check renal panel today. Avoid NSAIDs/nephrotoxins. \par  \par 2. HTN: BP in acceptable range during clinic visit today. Continue with current BP meds. Low salt diet advised. Monitor BP daily at home. \par \par 3. LE edema: Pt. initiated on oral Metolazone 2.5 mg on 4/13/23 for worsening B/L leg edema. No LE edema on exam during clinic visit today. Pt. advised to continue with Bumex 2 mg BID, and Metolazone 2.5 mg BID. Low salt diet and fluid restriction advised. Monitor body weight. \par \par Follow-up: 1 week

## 2023-05-03 NOTE — HISTORY OF PRESENT ILLNESS
[FreeTextEntry1] : 52-year-old female with longstanding history of insulin-dependent DM, sarcoidosis (biopsy proven in 2010), CKD, proteinuria, seizure disorder and hypercalcemia presents for follow-up visit. Pt. was last seen in clinic on 4/19/23. \par \par Kidney history: Pt. with previous history of MIKALA in the setting of hypercalcemia. Pt. was hospitalized at [facility] from 9/25/17 to 10/3/17. At that time, the patient was admitted with Scr of 5.65 and serum calcium of 15.3 on 09/25/17. Scr improved to 3.48 and serum calcium decreased to 10.7 on 10/03/17. Scr improved 1.44 and serum calcium was WNL at 10.1 on labs done on 9/24/18. Scr remained stable at 1.41 on 8/9/2021. Scr increased to 3.36 on 9/12/22. Pt with history of long standing uncontrolled DM. Scr was 2.95 on labs done on 11/1/22. Pt was initiated on PO Torsemide 10 mg once daily for B/L LE edema. Scr increased to 4.19 on labs done during clinic visit on 1/11/23. Torsemide dose was decreased to 5 mg once daily in view of worsening Scr and resolution of LE swelling. Pt. was admitted to [facility] (1/25/23-1/31/23) for worsening B/L LE chronic pain. Scr was elevated at 4.9 on 1/25/23 and remained elevated at 4.7 on 1/30/23. Pt. was seen by our inpatient nephrology team. Torsemide and Losartan were held during hospitalization and upon discharge. Pt. reported worsening of LE swelling with feeling of intermittent SOB after ~10 days of discharge from [facility]. Pt. was advised to initiate PO Torsemide 10 mg once daily on 2/13/23. Pt. developed worsening LE swelling and SOB and was subsequently admitted at [facility] (2/20/23-2/25/23). Scr was elevated at 5.15 on 2/20/23 and remained elevated at 6.27 on 2/25/23. Pt was seen was seen by our inpatient nephrology team at that time. Pt. received Bumex infusion and was later transitioned to PO Bumex 2 mg twice daily upon discharge. Pt. was advised to initiate long term RRT at that time, however pt refused. Pt. with significant b/L LE swelling on exam during clinic visit on 3/29/23. PO Bumex was increased to 2 mg TID at that time. Labs done on 4/5/23 showed an elevated serum potassium level of 5.5 and an elevated Scr of 6.38. Pt. was advised to go to the ER for further evaluation and initiation of HD however refused to go to the ER. Pt. initiated on oral Metolazone 2.5 mg on 4/13/23 for worsening B/L leg edema. Last Scr increased to 7.73 on labs performed on 4/19/23.\par \par Pt. says her B/L LE swelling has resolved after initiation of metolazone. She reports mild decrease in appetite. Otherwise feels well with no complaints. Pt. was advised to go to the ER multiple times to initiate HD. However, pt. continues to refuse initiate HD. No fever, CP, SOB, HA or dizziness during clinic visit today.

## 2023-05-04 ENCOUNTER — NON-APPOINTMENT (OUTPATIENT)
Age: 53
End: 2023-05-04

## 2023-05-04 DIAGNOSIS — N18.5 CHRONIC KIDNEY DISEASE, STAGE 5: ICD-10-CM

## 2023-05-04 DIAGNOSIS — I10 ESSENTIAL (PRIMARY) HYPERTENSION: ICD-10-CM

## 2023-05-04 DIAGNOSIS — R60.0 LOCALIZED EDEMA: ICD-10-CM

## 2023-05-10 ENCOUNTER — OUTPATIENT (OUTPATIENT)
Dept: OUTPATIENT SERVICES | Facility: HOSPITAL | Age: 53
LOS: 1 days | End: 2023-05-10

## 2023-05-10 ENCOUNTER — APPOINTMENT (OUTPATIENT)
Dept: NEPHROLOGY | Facility: CLINIC | Age: 53
End: 2023-05-10
Payer: MEDICARE

## 2023-05-10 VITALS
BODY MASS INDEX: 57.52 KG/M2 | WEIGHT: 293 LBS | HEIGHT: 60 IN | HEART RATE: 107 BPM | OXYGEN SATURATION: 98 % | DIASTOLIC BLOOD PRESSURE: 80 MMHG | SYSTOLIC BLOOD PRESSURE: 160 MMHG

## 2023-05-10 VITALS — DIASTOLIC BLOOD PRESSURE: 78 MMHG | SYSTOLIC BLOOD PRESSURE: 150 MMHG

## 2023-05-10 DIAGNOSIS — E83.51 HYPOCALCEMIA: ICD-10-CM

## 2023-05-10 PROCEDURE — 99214 OFFICE O/P EST MOD 30 MIN: CPT | Mod: GC

## 2023-05-10 NOTE — PHYSICAL EXAM
[General Appearance - Alert] : alert [General Appearance - In No Acute Distress] : in no acute distress [General Appearance - Well Nourished] : well nourished [Sclera] : the sclera and conjunctiva were normal [Outer Ear] : the ears and nose were normal in appearance [Jugular Venous Distention Increased] : there was no jugular-venous distention [Respiration, Rhythm And Depth] : normal respiratory rhythm and effort [Auscultation Breath Sounds / Voice Sounds] : lungs were clear to auscultation bilaterally [Heart Sounds] : normal S1 and S2 [Heart Sounds Gallop] : no gallops [Heart Sounds Pericardial Friction Rub] : no pericardial rub [Abdomen Tenderness] : non-tender [Abdomen Soft] : soft [No CVA Tenderness] : no ~M costovertebral angle tenderness [Abnormal Walk] : normal gait [Nail Clubbing] : no clubbing  or cyanosis of the fingernails [] : no rash [Oriented To Time, Place, And Person] : oriented to person, place, and time [Affect] : the affect was normal [Mood] : the mood was normal [FreeTextEntry1] : No asterixis seen (on outstretched hands)

## 2023-05-10 NOTE — HISTORY OF PRESENT ILLNESS
[FreeTextEntry1] : 52-year-old female with longstanding history of insulin-dependent DM, sarcoidosis (biopsy proven in 2010), CKD, proteinuria, seizure disorder and hypercalcemia presents for follow-up visit. Pt. was last seen in clinic on 5/3/23. \par \par Kidney history: Pt. with previous history of MIKALA in the setting of hypercalcemia. Pt. was hospitalized at [facility] from 9/25/17 to 10/3/17. At that time, the patient was admitted with Scr of 5.65 and serum calcium of 15.3 on 09/25/17. Scr improved to 3.48 and serum calcium decreased to 10.7 on 10/03/17. Scr improved 1.44 and serum calcium was WNL at 10.1 on labs done on 9/24/18. Scr remained stable at 1.41 on 8/9/2021. Scr increased to 3.36 on 9/12/22. Pt with history of long standing uncontrolled DM. Scr was 2.95 on labs done on 11/1/22. Pt was initiated on PO Torsemide 10 mg once daily for B/L LE edema. Scr increased to 4.19 on labs done during clinic visit on 1/11/23. Torsemide dose was decreased to 5 mg once daily in view of worsening Scr and resolution of LE swelling. Pt. was admitted to [facility] (1/25/23-1/31/23) for worsening B/L LE chronic pain. Scr was elevated at 4.9 on 1/25/23 and remained elevated at 4.7 on 1/30/23. Pt. was seen by our inpatient nephrology team. Torsemide and Losartan were held during hospitalization and upon discharge. Pt. reported worsening of LE swelling with feeling of intermittent SOB after ~10 days of discharge from [facility]. Pt. was advised to initiate PO Torsemide 10 mg once daily on 2/13/23. Pt. developed worsening LE swelling and SOB and was subsequently admitted at [facility] (2/20/23-2/25/23). Scr was elevated at 5.15 on 2/20/23 and remained elevated at 6.27 on 2/25/23. Pt was seen was seen by our inpatient nephrology team at that time. Pt. received Bumex infusion and was later transitioned to PO Bumex 2 mg twice daily upon discharge. Pt. was advised to initiate long term RRT at that time, however pt refused. Pt. with significant b/L LE swelling on exam during clinic visit on 3/29/23. PO Bumex was increased to 2 mg TID at that time. Labs done on 4/5/23 showed an elevated serum potassium level of 5.5 and an elevated Scr of 6.38. Pt. was advised to go to the ER for further evaluation and initiation of HD however refused to go to the ER. Pt. initiated on oral Metolazone 2.5 mg on 4/13/23 for worsening B/L leg edema. Last Scr increased to 7.28 on labs performed on 5/3/23.\par \par Pt. reports B/L LE edema which developed over the past week. She reports compliance with diuretic medication. Otherwise feels well with no complaints. Pt. was advised to go to the ER multiple times to initiate HD. However, pt. continues to refuse initiation of HD. No fever, CP, SOB, HA or dizziness during clinic visit today.

## 2023-05-10 NOTE — REVIEW OF SYSTEMS
[Lower Ext Edema] : lower extremity edema [As Noted in HPI] : as noted in HPI [Negative] : Heme/Lymph [Fever] : no fever [Feeling Tired] : not feeling tired [Eye Pain] : no eye pain [Eyesight Problems] : no eyesight problems [Earache] : no earache [Sore Throat] : no sore throat [Heart Rate Is Slow] : the heart rate was not slow [Heart Rate Is Fast] : the heart rate was not fast [Chest Pain] : no chest pain [Palpitations] : no palpitations [Shortness Of Breath] : no shortness of breath [Cough] : no cough [Abdominal Pain] : no abdominal pain [Vomiting] : no vomiting [Dysuria] : no dysuria [Arthralgias] : no arthralgias [Limb Swelling] : no limb swelling [Skin Lesions] : no skin lesions [Skin Wound] : no skin wound [Confused] : no confusion [Convulsions] : no convulsions [Depression] : no depression [Muscle Weakness] : no muscle weakness [de-identified] : chronic LE neuropathy

## 2023-05-10 NOTE — ED ADULT NURSE NOTE - NSIMPLEMENTINTERV_GEN_ALL_ED
MED
Implemented All Universal Safety Interventions:  [place] to call system. Call bell, personal items and telephone within reach. Instruct patient to call for assistance. Room bathroom lighting operational. Non-slip footwear when patient is off stretcher. Physically safe environment: no spills, clutter or unnecessary equipment. Stretcher in lowest position, wheels locked, appropriate side rails in place.

## 2023-05-11 ENCOUNTER — NON-APPOINTMENT (OUTPATIENT)
Age: 53
End: 2023-05-11

## 2023-05-11 PROBLEM — E83.51 HYPOCALCEMIA: Status: ACTIVE | Noted: 2023-05-11

## 2023-05-15 ENCOUNTER — APPOINTMENT (OUTPATIENT)
Dept: INTERNAL MEDICINE | Facility: CLINIC | Age: 53
End: 2023-05-15

## 2023-05-15 DIAGNOSIS — I10 ESSENTIAL (PRIMARY) HYPERTENSION: ICD-10-CM

## 2023-05-15 DIAGNOSIS — E83.51 HYPOCALCEMIA: ICD-10-CM

## 2023-05-15 DIAGNOSIS — N18.5 CHRONIC KIDNEY DISEASE, STAGE 5: ICD-10-CM

## 2023-05-15 DIAGNOSIS — R60.0 LOCALIZED EDEMA: ICD-10-CM

## 2023-05-31 ENCOUNTER — APPOINTMENT (OUTPATIENT)
Dept: NEPHROLOGY | Facility: CLINIC | Age: 53
End: 2023-05-31
Payer: MEDICARE

## 2023-05-31 ENCOUNTER — OUTPATIENT (OUTPATIENT)
Dept: OUTPATIENT SERVICES | Facility: HOSPITAL | Age: 53
LOS: 1 days | End: 2023-05-31

## 2023-05-31 VITALS
HEIGHT: 60 IN | DIASTOLIC BLOOD PRESSURE: 76 MMHG | WEIGHT: 118 LBS | OXYGEN SATURATION: 99 % | HEART RATE: 98 BPM | SYSTOLIC BLOOD PRESSURE: 140 MMHG | BODY MASS INDEX: 23.16 KG/M2

## 2023-05-31 VITALS — BODY MASS INDEX: 22.75 KG/M2 | WEIGHT: 116.5 LBS

## 2023-05-31 PROCEDURE — 99214 OFFICE O/P EST MOD 30 MIN: CPT | Mod: GC

## 2023-05-31 NOTE — ASSESSMENT
[FreeTextEntry1] : 1. CKD Stage 5/Proteinuria: Pt. with advanced/progressive CKD in setting of uncontrolled DM. Pt. with most likely diabetic nephropathy. Last Scr significantly elevated at 6.87 on labs done on 5/10/23.. Pt. was advised to go to the ER multiple times in the past for initiation of HD. Pt. however continues to refuse HD initiation. Pt. currently enrolled in [facility] in CKD program. Various options for RRT including HD, PD and kidney transplantation have been reviewed with the patient and her daughter (several times) during previous clinic visits. Check renal panel today. Avoid NSAIDs/nephrotoxins. \par  \par 2. HTN: BP in acceptable range during clinic visit today. Continue with current BP meds. Low salt diet advised. Monitor BP daily at home. \par \par 3. LE edema: Pt. currently receiving Bumex 2 mg BID, and Metolazone 5 mg BID (initiated on 2.5 mg BID on 4/13/23, dose increased to 5 mg BID on 5/10/23). Pt. with significant B/L LE edema on exam today. Increase Metolazone dose to 7.5 mg BID (30 minutes prior to Bumex dose). Low salt diet and fluid restriction advised. Monitor body weight. \par \par RTC in 1 week. Pt. advised to go to the ER, if she develops any new complaints.

## 2023-05-31 NOTE — REVIEW OF SYSTEMS
[Chills] : chills [Lower Ext Edema] : lower extremity edema [Negative] : Heme/Lymph [As Noted in HPI] : as noted in HPI [Limb Swelling] : limb swelling [Fever] : no fever [Feeling Tired] : not feeling tired [Eye Pain] : no eye pain [Eyesight Problems] : no eyesight problems [Earache] : no earache [Sore Throat] : no sore throat [Heart Rate Is Slow] : the heart rate was not slow [Heart Rate Is Fast] : the heart rate was not fast [Chest Pain] : no chest pain [Palpitations] : no palpitations [Shortness Of Breath] : no shortness of breath [Cough] : no cough [Abdominal Pain] : no abdominal pain [Vomiting] : no vomiting [Dysuria] : no dysuria [Arthralgias] : no arthralgias [Skin Lesions] : no skin lesions [Skin Wound] : no skin wound [Confused] : no confusion [Convulsions] : no convulsions [Depression] : no depression [Muscle Weakness] : no muscle weakness [de-identified] : chronic LE neuropathy

## 2023-05-31 NOTE — PHYSICAL EXAM
[General Appearance - Alert] : alert [General Appearance - In No Acute Distress] : in no acute distress [General Appearance - Well Nourished] : well nourished [Sclera] : the sclera and conjunctiva were normal [Outer Ear] : the ears and nose were normal in appearance [Jugular Venous Distention Increased] : there was no jugular-venous distention [Respiration, Rhythm And Depth] : normal respiratory rhythm and effort [Auscultation Breath Sounds / Voice Sounds] : lungs were clear to auscultation bilaterally [Heart Sounds] : normal S1 and S2 [Heart Sounds Gallop] : no gallops [Heart Sounds Pericardial Friction Rub] : no pericardial rub [Abdomen Soft] : soft [No CVA Tenderness] : no ~M costovertebral angle tenderness [Abdomen Tenderness] : non-tender [Abnormal Walk] : normal gait [Nail Clubbing] : no clubbing  or cyanosis of the fingernails [] : no rash [Oriented To Time, Place, And Person] : oriented to person, place, and time [Affect] : the affect was normal [Mood] : the mood was normal [FreeTextEntry1] : No asterixis seen (on outstretched hands)

## 2023-05-31 NOTE — END OF VISIT
[FreeTextEntry3] : I was physically present for the key portions of the evaluation and management (E/M) service provided. I agree with the above history, ROS, physical exam, assessment and plan which I have reviewed and edited where appropriate. I have also reviewed the assessment and management of CKD, HTN and LE edema with the patient during clinic visit today.\par \par Pt. with advanced CKD stage 5. Last Scr elevated at 6.87 on 5/10/23. Pt. continues to refuse HD initiation. Check renal panel today. Pt. with significant LE edema on exam today. Metolazone dose increased to 7.5 mg BID. Low salt diet and fluid restriction advised. Avoid nephrotoxins.

## 2023-05-31 NOTE — HISTORY OF PRESENT ILLNESS
[FreeTextEntry1] : 52-year-old female with longstanding history of insulin-dependent DM, sarcoidosis (biopsy proven in 2010), CKD, proteinuria, seizure disorder and hypercalcemia presents for follow-up visit. Pt. was last seen in clinic on 5/10/23. \par \par Kidney history: Pt. with previous history of MIKALA in the setting of hypercalcemia. Pt. was hospitalized at [facility] from 9/25/17 to 10/3/17. At that time, the patient was admitted with Scr of 5.65 and serum calcium of 15.3 on 09/25/17. Scr improved to 3.48 and serum calcium decreased to 10.7 on 10/03/17. Scr improved 1.44 and serum calcium was WNL at 10.1 on labs done on 9/24/18. Scr remained stable at 1.41 on 8/9/2021. Scr increased to 3.36 on 9/12/22. Pt with history of long standing uncontrolled DM. Scr was 2.95 on labs done on 11/1/22. Pt was initiated on PO Torsemide 10 mg once daily for B/L LE edema. Scr increased to 4.19 on labs done during clinic visit on 1/11/23. Torsemide dose was decreased to 5 mg once daily in view of worsening Scr and resolution of LE swelling. Pt. was admitted to [facility] (1/25/23-1/31/23) for worsening B/L LE chronic pain. Scr was elevated at 4.9 on 1/25/23 and remained elevated at 4.7 on 1/30/23. Pt. was seen by our inpatient nephrology team. Torsemide and Losartan were held during hospitalization and upon discharge. Pt. reported worsening of LE swelling with feeling of intermittent SOB after ~10 days of discharge from [facility]. Pt. was advised to initiate PO Torsemide 10 mg once daily on 2/13/23. Pt. developed worsening LE swelling and SOB and was subsequently admitted at [facility] (2/20/23-2/25/23). Scr was elevated at 5.15 on 2/20/23 and remained elevated at 6.27 on 2/25/23. Pt was seen was seen by our inpatient nephrology team at that time. Pt. received Bumex infusion and was later transitioned to PO Bumex 2 mg twice daily upon discharge. Pt. was advised to initiate long term RRT at that time, however pt refused. Pt. with significant b/L LE swelling on exam during clinic visit on 3/29/23. PO Bumex was increased to 2 mg TID at that time. Labs done on 4/5/23 showed an elevated serum potassium level of 5.5 and an elevated Scr of 6.38. Pt. was advised to go to the ER for further evaluation and initiation of HD however refused to go to the ER. Pt. initiated on oral Metolazone 2.5 mg on 4/13/23 for worsening B/L leg edema, increased to 5 mg BID on 5/10/23. Last Scr increased to 6.87 on labs performed on 5/10/23.\par \par Pt. reports intermittent chills, started ~4 days ago. She reports initial resolution of B/L LE edema with increased dose of Metolazone. Pt. however reports increasing B/L LE edema since 5/28. Pt. says she missed her PM diuretic dose on 5/28. Pt. was advised to go to the ER multiple times in the past to initiate HD. However, pt. continues to refuse initiation of HD. No fever, decreased appetite, nausea, vomiting, CP, SOB, HA or dizziness during clinic visit today.

## 2023-06-01 ENCOUNTER — NON-APPOINTMENT (OUTPATIENT)
Age: 53
End: 2023-06-01

## 2023-06-01 DIAGNOSIS — I10 ESSENTIAL (PRIMARY) HYPERTENSION: ICD-10-CM

## 2023-06-01 DIAGNOSIS — R60.0 LOCALIZED EDEMA: ICD-10-CM

## 2023-06-01 DIAGNOSIS — N18.5 CHRONIC KIDNEY DISEASE, STAGE 5: ICD-10-CM

## 2023-06-02 ENCOUNTER — APPOINTMENT (OUTPATIENT)
Dept: ENDOCRINOLOGY | Facility: CLINIC | Age: 53
End: 2023-06-02

## 2023-06-14 ENCOUNTER — APPOINTMENT (OUTPATIENT)
Dept: NEPHROLOGY | Facility: CLINIC | Age: 53
End: 2023-06-14

## 2023-06-15 ENCOUNTER — NON-APPOINTMENT (OUTPATIENT)
Age: 53
End: 2023-06-15

## 2023-06-22 ENCOUNTER — OUTPATIENT (OUTPATIENT)
Dept: OUTPATIENT SERVICES | Facility: HOSPITAL | Age: 53
LOS: 1 days | End: 2023-06-22
Payer: MEDICARE

## 2023-06-22 ENCOUNTER — APPOINTMENT (OUTPATIENT)
Dept: INTERNAL MEDICINE | Facility: CLINIC | Age: 53
End: 2023-06-22
Payer: MEDICARE

## 2023-06-22 ENCOUNTER — APPOINTMENT (OUTPATIENT)
Dept: VASCULAR SURGERY | Facility: CLINIC | Age: 53
End: 2023-06-22

## 2023-06-22 DIAGNOSIS — I10 ESSENTIAL (PRIMARY) HYPERTENSION: ICD-10-CM

## 2023-06-22 PROCEDURE — G0463: CPT

## 2023-06-22 PROCEDURE — 99213 OFFICE O/P EST LOW 20 MIN: CPT | Mod: GE,95

## 2023-06-23 DIAGNOSIS — E11.9 TYPE 2 DIABETES MELLITUS WITHOUT COMPLICATIONS: ICD-10-CM

## 2023-06-23 NOTE — PHYSICAL EXAM
[Normal] : affect was normal and insight and judgment were intact [de-identified] : trace pitting edema to ankles

## 2023-06-23 NOTE — PLAN
[FreeTextEntry1] : Patient is a 52 F with PMHx of CKD V, Uncontrolled Type 2 Diabetes Mellitus, HLD, HTN, Sarcoidosis presents for follow-up regarding HD initiation.\par  \par #Leg Swelling\par -Compression stockings for some symptom relief\par -Plan below re: CKD\par \par #Diabetes\par -Will reduce Basaglar to 22 Units at bedtime due to AM hypoglycemia\par -C/w Novolog 8 Units\par -Encouraged patient to be consistent with Trulicity\par -Continue Glucerna given poor nutrition\par -A1C ordered, plan to draw with bmp during renal appt. \par \par  #CKDV \par - At this time, patient continues to refuse to initiating HD. Patient has full capacity and able to fully explain the current situation. Able to reiterate risks of waiting until an emergent situation rather than planning out HD at this time. Also knows if she changes her mind, she can go to the hospital or call her nephrologist for instructions.\par  - Patient intermittently hypervolemic. Denies SOB/ENGLISH, but sometimes has bilateral pitting edema to shins. Reiterated the risks of volume overload and respiratory compromise if she continues to refuse HD. Pt to continue on PO Bumex 2mg BID and Metalazone 7.5 mg BID.\par   -Patient missed her appt with Nephrology for weekly labs. Patient has reached to Nephro, plan for appt next week.\par  -Patient refusing Palliative Care/Hospice as well as Behavioral Health visit with Dr. Colleti. States she has  with her family, her Yars[employer]ism michelle and recently started going to a [employer] with her nephew.   \par \par #Goals of Care\par  - Patient states she wants to remain FULL CODE. Reiterated risks of delaying HD causing electrolyte abnormalities  - Also explained the importance of her picking a health care proxy.Having difficulty establishing one amongst her 6 children since they each have different opinions on what to medically. Told patient she needs to establish someone to make medical decisions for her if she is not able to, which can be imminent given her medical conditions.

## 2023-06-23 NOTE — HISTORY OF PRESENT ILLNESS
[Home] : at home, [unfilled] , at the time of the visit. [Medical Office: ([facility])___] : at the medical office located in  [Verbal consent obtained from patient] : the patient, [unfilled] [FreeTextEntry1] : follow-up [de-identified] : Patient is a 52 F with PMHx of CKD V, Uncontrolled Type 2 Diabetes Mellitus, HLD, HTN, Sarcoidosis presents for follow-up regarding HD initiation.\par  \par Patient has complaints of lower extremity swelling, worse throughout the day, worse with legs hanging down. Improves with diuretics, legs lifted upward. Denies SOB, ENGLISH, chest pain, N/V, abdominal pain, abdominal swelling.\par \par For her DM, does not track her FSG all the time. But when she does, AM FSG sometimes low at 50-60 with symptoms of hypoglycemia 2-3x/week. Pre-meal, sugars 240-250s but there are times post-meal, when her sugars drop to 70. Has poor appetite, but drinks Glucerna to supplement diet. Using Trulicity intermittently, thought it was causing her sugars to drop.

## 2023-06-29 ENCOUNTER — NON-APPOINTMENT (OUTPATIENT)
Age: 53
End: 2023-06-29

## 2023-07-12 ENCOUNTER — APPOINTMENT (OUTPATIENT)
Dept: NEPHROLOGY | Facility: CLINIC | Age: 53
End: 2023-07-12
Payer: MEDICARE

## 2023-07-12 ENCOUNTER — OUTPATIENT (OUTPATIENT)
Dept: OUTPATIENT SERVICES | Facility: HOSPITAL | Age: 53
LOS: 1 days | End: 2023-07-12

## 2023-07-12 VITALS
HEART RATE: 101 BPM | RESPIRATION RATE: 18 BRPM | DIASTOLIC BLOOD PRESSURE: 79 MMHG | TEMPERATURE: 97.7 F | WEIGHT: 120 LBS | HEIGHT: 60 IN | SYSTOLIC BLOOD PRESSURE: 162 MMHG | OXYGEN SATURATION: 100 % | BODY MASS INDEX: 23.56 KG/M2

## 2023-07-12 VITALS — DIASTOLIC BLOOD PRESSURE: 80 MMHG | SYSTOLIC BLOOD PRESSURE: 140 MMHG

## 2023-07-12 LAB
ALBUMIN SERPL ELPH-MCNC: 4.1 G/DL
ANION GAP SERPL CALC-SCNC: 25 MMOL/L
BUN SERPL-MCNC: 124 MG/DL
CALCIUM SERPL-MCNC: 7.5 MG/DL
CHLORIDE SERPL-SCNC: 91 MMOL/L
CO2 SERPL-SCNC: 21 MMOL/L
CREAT SERPL-MCNC: 7.96 MG/DL
EGFR: 6 ML/MIN/1.73M2
GLUCOSE SERPL-MCNC: 208 MG/DL
PHOSPHATE SERPL-MCNC: 6.6 MG/DL
POTASSIUM SERPL-SCNC: 4.6 MMOL/L
SODIUM SERPL-SCNC: 136 MMOL/L

## 2023-07-12 PROCEDURE — 99214 OFFICE O/P EST MOD 30 MIN: CPT | Mod: GC

## 2023-07-12 RX ORDER — METOLAZONE 2.5 MG/1
2.5 TABLET ORAL
Qty: 60 | Refills: 1 | Status: DISCONTINUED | COMMUNITY
Start: 2023-05-31 | End: 2023-07-12

## 2023-07-12 NOTE — ASSESSMENT
[FreeTextEntry1] : 1. CKD Stage 5/Proteinuria: Pt. with advanced/progressive CKD in setting of uncontrolled DM. Pt. with most likely diabetic nephropathy. Last Scr significantly elevated at 7.02 on labs done on 5/31/23.. Pt. was advised to go to the ER multiple times in the past for initiation of HD. Pt. however continues to refuse HD initiation. Pt. currently enrolled in [facility] in CKD program. Various options for RRT including HD, PD and kidney transplantation have been reviewed with the patient and her daughter (several times) during previous clinic visits. Pt. with asterixis on exam today, advised to go to the ER today to initiate dialysis. Pt. refusing to go to ER today, wants to review her labs results (from today) first. Check renal panel today. Avoid NSAIDs/nephrotoxins.\par  \par 2. HTN: BP in acceptable range during clinic visit today. Continue with current BP meds. Low salt diet advised. Monitor BP daily at home. \par \par 3. LE edema: Pt. currently receiving Bumex 2 mg BID, and Metolazone 5 mg BID (initiated on 2.5 mg BID on 4/13/23, dose increased to 5 mg BID on 5/10/23). Pt. with stable B/L LE edema on exam today. Continue with Bumex and metolazone at current dose. Low salt diet and fluid restriction advised. Monitor body weight. \par \par RTC in 1 week. Pt. advised to go to the ER, if she develops any new complaints.

## 2023-07-12 NOTE — REVIEW OF SYSTEMS
[Lower Ext Edema] : lower extremity edema [Limb Swelling] : limb swelling [As Noted in HPI] : as noted in HPI [Negative] : Heme/Lymph [Fever] : no fever [Chills] : no chills [Feeling Tired] : not feeling tired [Eye Pain] : no eye pain [Eyesight Problems] : no eyesight problems [Earache] : no earache [Sore Throat] : no sore throat [Heart Rate Is Slow] : the heart rate was not slow [Heart Rate Is Fast] : the heart rate was not fast [Chest Pain] : no chest pain [Palpitations] : no palpitations [Shortness Of Breath] : no shortness of breath [Cough] : no cough [Abdominal Pain] : no abdominal pain [Vomiting] : no vomiting [Dysuria] : no dysuria [Arthralgias] : no arthralgias [Skin Lesions] : no skin lesions [Skin Wound] : no skin wound [Confused] : no confusion [Convulsions] : no convulsions [Depression] : no depression [Muscle Weakness] : no muscle weakness [FreeTextEntry5] : +Stable BL LE edema [de-identified] : chronic LE neuropathy

## 2023-07-12 NOTE — HISTORY OF PRESENT ILLNESS
[FreeTextEntry1] : HPI: 52-year-old female with longstanding history of insulin-dependent DM, sarcoidosis (biopsy proven in 2010), CKD, proteinuria, seizure disorder and hypercalcemia presents for follow-up visit. Pt. was last seen in clinic on 5/31/23. \par \par Kidney history: Pt. with previous history of MIKALA in the setting of hypercalcemia. Pt. was hospitalized at [facility] from 9/25/17 to 10/3/17. At that time, the patient was admitted with Scr of 5.65 and serum calcium of 15.3 on 09/25/17. Scr improved to 3.48 and serum calcium decreased to 10.7 on 10/03/17. Scr improved 1.44 and serum calcium was WNL at 10.1 on labs done on 9/24/18. Scr remained stable at 1.41 on 8/9/2021. Scr increased to 3.36 on 9/12/22. Pt with history of long standing uncontrolled DM. Scr was 2.95 on labs done on 11/1/22. Pt was initiated on PO Torsemide 10 mg once daily for B/L LE edema. Scr increased to 4.19 on labs done during clinic visit on 1/11/23. Torsemide dose was decreased to 5 mg once daily in view of worsening Scr and resolution of LE swelling. Pt. was admitted to [facility] (1/25/23-1/31/23) for worsening B/L LE chronic pain. Scr was elevated at 4.9 on 1/25/23 and remained elevated at 4.7 on 1/30/23. Pt. was seen by our inpatient nephrology team. Torsemide and Losartan were held during hospitalization and upon discharge. Pt. reported worsening of LE swelling with feeling of intermittent SOB after ~10 days of discharge from [facility]. Pt. was advised to initiate PO Torsemide 10 mg once daily on 2/13/23. Pt. developed worsening LE swelling and SOB and was subsequently admitted at [facility] (2/20/23-2/25/23). Scr was elevated at 5.15 on 2/20/23 and remained elevated at 6.27 on 2/25/23. Pt was seen was seen by our inpatient nephrology team at that time. Pt. received Bumex infusion and was later transitioned to PO Bumex 2 mg twice daily upon discharge. Pt. was advised to initiate long term RRT at that time, however pt refused. Pt. with significant b/L LE swelling on exam during clinic visit on 3/29/23. PO Bumex was increased to 2 mg TID at that time. Labs done on 4/5/23 showed an elevated serum potassium level of 5.5 and an elevated Scr of 6.38. Pt. was advised to go to the ER for further evaluation and initiation of HD however refused to go to the ER. Pt. initiated on oral Metolazone 2.5 mg on 4/13/23 for worsening B/L leg edema, increased to 5 mg BID on 5/10/23. Last Scr increased to 7.02 on labs performed on 5/31/23.\par \par Pt. reports stable BL LE edema. Otherwise, feels well. Pt. was advised to go to the ER multiple times in the past to initiate HD. However, pt. continues to refuse initiation of HD. No fever, decreased appetite, nausea, vomiting, CP, SOB, HA or dizziness during clinic visit today.

## 2023-07-12 NOTE — PHYSICAL EXAM
[General Appearance - Alert] : alert [General Appearance - In No Acute Distress] : in no acute distress [General Appearance - Well Nourished] : well nourished [Sclera] : the sclera and conjunctiva were normal [Outer Ear] : the ears and nose were normal in appearance [Jugular Venous Distention Increased] : there was no jugular-venous distention [Respiration, Rhythm And Depth] : normal respiratory rhythm and effort [Auscultation Breath Sounds / Voice Sounds] : lungs were clear to auscultation bilaterally [Heart Sounds] : normal S1 and S2 [Heart Sounds Gallop] : no gallops [Heart Sounds Pericardial Friction Rub] : no pericardial rub [Abdomen Soft] : soft [Abdomen Tenderness] : non-tender [No CVA Tenderness] : no ~M costovertebral angle tenderness [Abnormal Walk] : normal gait [Nail Clubbing] : no clubbing  or cyanosis of the fingernails [] : no rash [Oriented To Time, Place, And Person] : oriented to person, place, and time [Affect] : the affect was normal [Mood] : the mood was normal [FreeTextEntry1] : +Asterixis on outstretched hands bilaterally

## 2023-07-12 NOTE — END OF VISIT
[FreeTextEntry3] : I was physically present for the key portions of the evaluation and management (E/M) service provided. I agree with the above history, ROS, physical exam, assessment and plan which I have reviewed and edited where appropriate. I have also reviewed the assessment and management of CKD, HTN and LE edema with the patient during clinic visit today.\par \par Pt. with advanced CKD stage 5. Last Scr elevated at 7.02 on 5/31/23. Pt. continues to refuse HD initiation. Check renal panel today. Pt. with B/L LE edema, on diuretic therapy. Low salt diet and fluid restriction advised. Avoid nephrotoxins.

## 2023-07-13 ENCOUNTER — NON-APPOINTMENT (OUTPATIENT)
Age: 53
End: 2023-07-13

## 2023-07-13 DIAGNOSIS — N18.5 CHRONIC KIDNEY DISEASE, STAGE 5: ICD-10-CM

## 2023-07-13 DIAGNOSIS — R60.0 LOCALIZED EDEMA: ICD-10-CM

## 2023-07-13 DIAGNOSIS — I10 ESSENTIAL (PRIMARY) HYPERTENSION: ICD-10-CM

## 2023-07-13 LAB — HBV SURFACE AG SER QL: NONREACTIVE

## 2023-07-19 ENCOUNTER — APPOINTMENT (OUTPATIENT)
Dept: NEPHROLOGY | Facility: CLINIC | Age: 53
End: 2023-07-19

## 2023-08-23 ENCOUNTER — APPOINTMENT (OUTPATIENT)
Dept: NEPHROLOGY | Facility: CLINIC | Age: 53
End: 2023-08-23
Payer: MEDICARE

## 2023-08-23 ENCOUNTER — OUTPATIENT (OUTPATIENT)
Dept: OUTPATIENT SERVICES | Facility: HOSPITAL | Age: 53
LOS: 1 days | End: 2023-08-23

## 2023-08-23 VITALS — DIASTOLIC BLOOD PRESSURE: 88 MMHG | SYSTOLIC BLOOD PRESSURE: 182 MMHG

## 2023-08-23 VITALS
RESPIRATION RATE: 20 BRPM | DIASTOLIC BLOOD PRESSURE: 88 MMHG | HEIGHT: 60 IN | WEIGHT: 119 LBS | OXYGEN SATURATION: 98 % | TEMPERATURE: 97.5 F | SYSTOLIC BLOOD PRESSURE: 182 MMHG | HEART RATE: 101 BPM | BODY MASS INDEX: 23.36 KG/M2

## 2023-08-23 PROCEDURE — 99214 OFFICE O/P EST MOD 30 MIN: CPT | Mod: GC

## 2023-08-23 NOTE — END OF VISIT
[FreeTextEntry3] : I was physically present for the key portions of the evaluation and management (E/M) service provided. I agree with the above history, ROS, physical exam, assessment and plan which I have reviewed and edited where appropriate. I have also reviewed the assessment and management of CKD, HTN and LE edema with the patient during clinic visit today.  Pt. with advanced CKD stage 5. Last Scr elevated at 7.96 on 7/12/23. Pt. continues to refuse HD initiation. Check renal panel today. Pt. with B/L LE edema, on diuretic therapy. Pt. with elevated BP readings in clinic today, oral Metoprolol added. Low salt diet and fluid restriction advised. Avoid nephrotoxins.

## 2023-08-23 NOTE — HISTORY OF PRESENT ILLNESS
[FreeTextEntry1] : HPI: 52-year-old female with advanced CKD in the setting of longstanding history of insulin-dependent DM, sarcoidosis (biopsy proven in 2010), proteinuria, seizure disorder and hypercalcemia presents for follow-up visit. Pt. was last seen in clinic on 7/12/23.   Kidney history: Pt. with previous history of MIKALA in the setting of hypercalcemia. Pt. was hospitalized at [facility] from 9/25/17 to 10/3/17. At that time, the patient was admitted with Scr of 5.65 and serum calcium of 15.3 on 09/25/17. Scr improved to 3.48 and serum calcium decreased to 10.7 on 10/03/17. Scr improved 1.44 and serum calcium was WNL at 10.1 on labs done on 9/24/18. Scr remained stable at 1.41 on 8/9/2021. Scr increased to 3.36 on 9/12/22. Pt with history of long standing uncontrolled DM. Scr was 2.95 on labs done on 11/1/22. Pt was initiated on PO Torsemide 10 mg once daily for B/L LE edema. Scr increased to 4.19 on labs done during clinic visit on 1/11/23. Torsemide dose was decreased to 5 mg once daily in view of worsening Scr and resolution of LE swelling. Pt. was admitted to [facility] (1/25/23-1/31/23) for worsening B/L LE chronic pain. Scr was elevated at 4.9 on 1/25/23 and remained elevated at 4.7 on 1/30/23. Pt. was seen by our inpatient nephrology team. Torsemide and Losartan were held during hospitalization and upon discharge. Pt. reported worsening of LE swelling with feeling of intermittent SOB after ~10 days of discharge from [facility]. Pt. was advised to initiate PO Torsemide 10 mg once daily on 2/13/23. Pt. developed worsening LE swelling and SOB and was subsequently admitted at [facility] (2/20/23-2/25/23). Scr was elevated at 5.15 on 2/20/23 and remained elevated at 6.27 on 2/25/23. Pt was seen was seen by our inpatient nephrology team at that time. Pt. received Bumex infusion and was later transitioned to PO Bumex 2 mg twice daily upon discharge. Pt. was advised to initiate long term RRT at that time, however pt refused. Pt. with significant b/L LE swelling on exam during clinic visit on 3/29/23. PO Bumex was increased to 2 mg TID at that time. Labs done on 4/5/23 showed an elevated serum potassium level of 5.5 and an elevated Scr of 6.38. Pt. was advised to go to the ER for further evaluation and initiation of HD however refused to go to the ER. Pt. initiated on oral Metolazone 2.5 mg on 4/13/23 for worsening B/L leg edema, increased to 5 mg BID on 5/10/23. Last Scr increased to 7.96 on labs performed on 7/12/23.  Pt. reports B/L LE numbness and tingling. Also endorses trouble sleeping at night. Pt. states B/L LE edema has improved. Otherwise, feels well. Pt. was advised to go to the ER multiple times in the past to initiate HD. However, pt. continues to refuse initiation of HD. No fever, decreased appetite, nausea, vomiting, CP, SOB, HA or dizziness during clinic visit today.

## 2023-08-23 NOTE — REVIEW OF SYSTEMS
[Lower Ext Edema] : lower extremity edema [Limb Swelling] : limb swelling [As Noted in HPI] : as noted in HPI [Negative] : Heme/Lymph [Fever] : no fever [Chills] : no chills [Feeling Tired] : not feeling tired [Eye Pain] : no eye pain [Eyesight Problems] : no eyesight problems [Earache] : no earache [Sore Throat] : no sore throat [Heart Rate Is Slow] : the heart rate was not slow [Heart Rate Is Fast] : the heart rate was not fast [Chest Pain] : no chest pain [Palpitations] : no palpitations [Shortness Of Breath] : no shortness of breath [Cough] : no cough [Abdominal Pain] : no abdominal pain [Vomiting] : no vomiting [Dysuria] : no dysuria [Arthralgias] : no arthralgias [Skin Lesions] : no skin lesions [Skin Wound] : no skin wound [Confused] : no confusion [Convulsions] : no convulsions [Depression] : no depression [Muscle Weakness] : no muscle weakness [FreeTextEntry2] : +Insomnia [FreeTextEntry9] : decreased LE edema [de-identified] : chronic LE neuropathy, worsening recently

## 2023-08-23 NOTE — ASSESSMENT
[FreeTextEntry1] : 1. CKD Stage 5/Proteinuria: Pt. with advanced/progressive CKD in setting of uncontrolled DM. Pt. with most likely diabetic nephropathy. Last Scr significantly elevated at 7.96 on labs done on 7/12/23. Pt. was advised to go to the ER multiple times in the past for initiation of HD. Pt. however continues to refuse HD initiation. Pt. currently enrolled in [facility] in CKD program. Various options for RRT including HD, PD and kidney transplantation have been reviewed with the patient and her daughter (several times) during previous clinic visits. Pt. advised again to go to the ER today to initiate dialysis. Pt. states she will consider going to the ER today but needs to go home first. Check renal panel today. Avoid NSAIDs/nephrotoxins.    2. HTN: Pt. with elevated BP readings during clinic visit today. Add oral Metoprolol Succinate ER 25 mg once daily. Low salt diet advised. Monitor BP daily at home.   3. LE edema: Pt. currently receiving Bumex 2 mg BID and Metolazone 5 mg BID (initiated on 2.5 mg BID on 4/13/23, dose increased to 5 mg BID on 5/10/23). Pt. with mild B/L LE edema on exam today. Continue with Bumex and Metolazone at current dose. Low salt diet and fluid restriction advised. Monitor body weight.   Pt. was advised to go to the ER again today but remains undecided. Follow-up appointment in 2 weeks.

## 2023-08-24 ENCOUNTER — NON-APPOINTMENT (OUTPATIENT)
Age: 53
End: 2023-08-24

## 2023-08-24 DIAGNOSIS — I10 ESSENTIAL (PRIMARY) HYPERTENSION: ICD-10-CM

## 2023-08-24 DIAGNOSIS — N18.5 CHRONIC KIDNEY DISEASE, STAGE 5: ICD-10-CM

## 2023-08-24 DIAGNOSIS — R60.0 LOCALIZED EDEMA: ICD-10-CM

## 2023-09-01 ENCOUNTER — NON-APPOINTMENT (OUTPATIENT)
Age: 53
End: 2023-09-01

## 2023-09-06 ENCOUNTER — APPOINTMENT (OUTPATIENT)
Dept: NEPHROLOGY | Facility: CLINIC | Age: 53
End: 2023-09-06
Payer: MEDICARE

## 2023-09-06 ENCOUNTER — INPATIENT (INPATIENT)
Facility: HOSPITAL | Age: 53
LOS: 0 days | Discharge: LEFT BEFORE DISCHARGE | End: 2023-09-07
Attending: STUDENT IN AN ORGANIZED HEALTH CARE EDUCATION/TRAINING PROGRAM | Admitting: STUDENT IN AN ORGANIZED HEALTH CARE EDUCATION/TRAINING PROGRAM
Payer: MEDICARE

## 2023-09-06 ENCOUNTER — OUTPATIENT (OUTPATIENT)
Dept: OUTPATIENT SERVICES | Facility: HOSPITAL | Age: 53
LOS: 1 days | End: 2023-09-06

## 2023-09-06 VITALS
RESPIRATION RATE: 20 BRPM | DIASTOLIC BLOOD PRESSURE: 86 MMHG | TEMPERATURE: 99 F | OXYGEN SATURATION: 100 % | HEART RATE: 11 BPM | SYSTOLIC BLOOD PRESSURE: 156 MMHG

## 2023-09-06 VITALS
SYSTOLIC BLOOD PRESSURE: 176 MMHG | OXYGEN SATURATION: 99 % | DIASTOLIC BLOOD PRESSURE: 85 MMHG | BODY MASS INDEX: 22.38 KG/M2 | HEART RATE: 102 BPM | HEIGHT: 60 IN | WEIGHT: 114 LBS | RESPIRATION RATE: 16 BRPM

## 2023-09-06 DIAGNOSIS — I10 ESSENTIAL (PRIMARY) HYPERTENSION: ICD-10-CM

## 2023-09-06 DIAGNOSIS — N18.5 CHRONIC KIDNEY DISEASE, STAGE 5: ICD-10-CM

## 2023-09-06 DIAGNOSIS — R60.0 LOCALIZED EDEMA: ICD-10-CM

## 2023-09-06 DIAGNOSIS — E11.65 TYPE 2 DIABETES MELLITUS WITH HYPERGLYCEMIA: ICD-10-CM

## 2023-09-06 LAB
A1C WITH ESTIMATED AVERAGE GLUCOSE RESULT: 10.4 % — HIGH (ref 4–5.6)
ALBUMIN SERPL ELPH-MCNC: 4 G/DL — SIGNIFICANT CHANGE UP (ref 3.3–5)
ALP SERPL-CCNC: 62 U/L — SIGNIFICANT CHANGE UP (ref 40–120)
ALT FLD-CCNC: 10 U/L — SIGNIFICANT CHANGE UP (ref 4–33)
ANION GAP SERPL CALC-SCNC: 22 MMOL/L — HIGH (ref 7–14)
ANION GAP SERPL CALC-SCNC: 26 MMOL/L — HIGH (ref 7–14)
APPEARANCE UR: CLEAR — SIGNIFICANT CHANGE UP
AST SERPL-CCNC: 11 U/L — SIGNIFICANT CHANGE UP (ref 4–32)
B-OH-BUTYR SERPL-SCNC: 1 MMOL/L — HIGH (ref 0–0.4)
BASOPHILS # BLD AUTO: 0.02 K/UL — SIGNIFICANT CHANGE UP (ref 0–0.2)
BASOPHILS NFR BLD AUTO: 0.2 % — SIGNIFICANT CHANGE UP (ref 0–2)
BILIRUB SERPL-MCNC: 0.2 MG/DL — SIGNIFICANT CHANGE UP (ref 0.2–1.2)
BILIRUB UR-MCNC: NEGATIVE — SIGNIFICANT CHANGE UP
BLOOD GAS VENOUS COMPREHENSIVE RESULT: SIGNIFICANT CHANGE UP
BLOOD GAS VENOUS COMPREHENSIVE RESULT: SIGNIFICANT CHANGE UP
BUN SERPL-MCNC: 106 MG/DL — HIGH (ref 7–23)
BUN SERPL-MCNC: 107 MG/DL — HIGH (ref 7–23)
CALCIUM SERPL-MCNC: 8.2 MG/DL — LOW (ref 8.4–10.5)
CALCIUM SERPL-MCNC: 8.7 MG/DL — SIGNIFICANT CHANGE UP (ref 8.4–10.5)
CHLORIDE SERPL-SCNC: 84 MMOL/L — LOW (ref 98–107)
CHLORIDE SERPL-SCNC: 89 MMOL/L — LOW (ref 98–107)
CO2 SERPL-SCNC: 18 MMOL/L — LOW (ref 22–31)
CO2 SERPL-SCNC: 19 MMOL/L — LOW (ref 22–31)
COLOR SPEC: YELLOW — SIGNIFICANT CHANGE UP
CREAT SERPL-MCNC: 9.64 MG/DL — HIGH (ref 0.5–1.3)
CREAT SERPL-MCNC: 9.9 MG/DL — HIGH (ref 0.5–1.3)
DIFF PNL FLD: NEGATIVE — SIGNIFICANT CHANGE UP
EGFR: 4 ML/MIN/1.73M2 — LOW
EGFR: 4 ML/MIN/1.73M2 — LOW
EOSINOPHIL # BLD AUTO: 0.02 K/UL — SIGNIFICANT CHANGE UP (ref 0–0.5)
EOSINOPHIL NFR BLD AUTO: 0.2 % — SIGNIFICANT CHANGE UP (ref 0–6)
ESTIMATED AVERAGE GLUCOSE: 252 — SIGNIFICANT CHANGE UP
GLUCOSE BLDC GLUCOMTR-MCNC: 310 MG/DL — HIGH (ref 70–99)
GLUCOSE BLDC GLUCOMTR-MCNC: 370 MG/DL — HIGH (ref 70–99)
GLUCOSE SERPL-MCNC: 506 MG/DL — CRITICAL HIGH (ref 70–99)
GLUCOSE SERPL-MCNC: 677 MG/DL — CRITICAL HIGH (ref 70–99)
GLUCOSE UR QL: >=1000 MG/DL
HCT VFR BLD CALC: 23.1 % — LOW (ref 34.5–45)
HGB BLD-MCNC: 7.9 G/DL — LOW (ref 11.5–15.5)
IANC: 8.48 K/UL — HIGH (ref 1.8–7.4)
IMM GRANULOCYTES NFR BLD AUTO: 1.1 % — HIGH (ref 0–0.9)
KETONES UR-MCNC: NEGATIVE MG/DL — SIGNIFICANT CHANGE UP
LEUKOCYTE ESTERASE UR-ACNC: NEGATIVE — SIGNIFICANT CHANGE UP
LYMPHOCYTES # BLD AUTO: 0.58 K/UL — LOW (ref 1–3.3)
LYMPHOCYTES # BLD AUTO: 6.2 % — LOW (ref 13–44)
MCHC RBC-ENTMCNC: 27.9 PG — SIGNIFICANT CHANGE UP (ref 27–34)
MCHC RBC-ENTMCNC: 34.2 GM/DL — SIGNIFICANT CHANGE UP (ref 32–36)
MCV RBC AUTO: 81.6 FL — SIGNIFICANT CHANGE UP (ref 80–100)
MONOCYTES # BLD AUTO: 0.12 K/UL — SIGNIFICANT CHANGE UP (ref 0–0.9)
MONOCYTES NFR BLD AUTO: 1.3 % — LOW (ref 2–14)
NEUTROPHILS # BLD AUTO: 8.48 K/UL — HIGH (ref 1.8–7.4)
NEUTROPHILS NFR BLD AUTO: 91 % — HIGH (ref 43–77)
NITRITE UR-MCNC: NEGATIVE — SIGNIFICANT CHANGE UP
NRBC # BLD: 0 /100 WBCS — SIGNIFICANT CHANGE UP (ref 0–0)
NRBC # FLD: 0 K/UL — SIGNIFICANT CHANGE UP (ref 0–0)
PH UR: 7 — SIGNIFICANT CHANGE UP (ref 5–8)
PLATELET # BLD AUTO: 273 K/UL — SIGNIFICANT CHANGE UP (ref 150–400)
POTASSIUM SERPL-MCNC: 4.7 MMOL/L — SIGNIFICANT CHANGE UP (ref 3.5–5.3)
POTASSIUM SERPL-MCNC: 5.7 MMOL/L — HIGH (ref 3.5–5.3)
POTASSIUM SERPL-SCNC: 4.7 MMOL/L — SIGNIFICANT CHANGE UP (ref 3.5–5.3)
POTASSIUM SERPL-SCNC: 5.7 MMOL/L — HIGH (ref 3.5–5.3)
PROT SERPL-MCNC: 8.4 G/DL — HIGH (ref 6–8.3)
PROT UR-MCNC: >=1000 MG/DL
RBC # BLD: 2.83 M/UL — LOW (ref 3.8–5.2)
RBC # FLD: 11.8 % — SIGNIFICANT CHANGE UP (ref 10.3–14.5)
SODIUM SERPL-SCNC: 128 MMOL/L — LOW (ref 135–145)
SODIUM SERPL-SCNC: 130 MMOL/L — LOW (ref 135–145)
SP GR SPEC: 1.02 — SIGNIFICANT CHANGE UP (ref 1–1.03)
UROBILINOGEN FLD QL: 0.2 MG/DL — SIGNIFICANT CHANGE UP (ref 0.2–1)
WBC # BLD: 9.32 K/UL — SIGNIFICANT CHANGE UP (ref 3.8–10.5)
WBC # FLD AUTO: 9.32 K/UL — SIGNIFICANT CHANGE UP (ref 3.8–10.5)

## 2023-09-06 PROCEDURE — 99283 EMERGENCY DEPT VISIT LOW MDM: CPT | Mod: GC

## 2023-09-06 PROCEDURE — 99214 OFFICE O/P EST MOD 30 MIN: CPT | Mod: GC

## 2023-09-06 PROCEDURE — 99285 EMERGENCY DEPT VISIT HI MDM: CPT

## 2023-09-06 PROCEDURE — 71045 X-RAY EXAM CHEST 1 VIEW: CPT | Mod: 26

## 2023-09-06 RX ORDER — SODIUM ZIRCONIUM CYCLOSILICATE 10 G/10G
10 POWDER, FOR SUSPENSION ORAL ONCE
Refills: 0 | Status: COMPLETED | OUTPATIENT
Start: 2023-09-06 | End: 2023-09-06

## 2023-09-06 RX ORDER — INSULIN GLARGINE 100 [IU]/ML
40 INJECTION, SOLUTION SUBCUTANEOUS ONCE
Refills: 0 | Status: COMPLETED | OUTPATIENT
Start: 2023-09-06 | End: 2023-09-06

## 2023-09-06 RX ORDER — SODIUM CHLORIDE 9 MG/ML
500 INJECTION INTRAMUSCULAR; INTRAVENOUS; SUBCUTANEOUS ONCE
Refills: 0 | Status: COMPLETED | OUTPATIENT
Start: 2023-09-06 | End: 2023-09-06

## 2023-09-06 RX ORDER — INSULIN HUMAN 100 [IU]/ML
5 INJECTION, SOLUTION SUBCUTANEOUS ONCE
Refills: 0 | Status: COMPLETED | OUTPATIENT
Start: 2023-09-06 | End: 2023-09-06

## 2023-09-06 RX ORDER — CALCIUM GLUCONATE 100 MG/ML
2 VIAL (ML) INTRAVENOUS ONCE
Refills: 0 | Status: COMPLETED | OUTPATIENT
Start: 2023-09-06 | End: 2023-09-06

## 2023-09-06 RX ORDER — INSULIN LISPRO 100/ML
4 VIAL (ML) SUBCUTANEOUS ONCE
Refills: 0 | Status: COMPLETED | OUTPATIENT
Start: 2023-09-06 | End: 2023-09-06

## 2023-09-06 RX ADMIN — SODIUM CHLORIDE 500 MILLILITER(S): 9 INJECTION INTRAMUSCULAR; INTRAVENOUS; SUBCUTANEOUS at 17:43

## 2023-09-06 RX ADMIN — INSULIN HUMAN 5 UNIT(S): 100 INJECTION, SOLUTION SUBCUTANEOUS at 19:42

## 2023-09-06 RX ADMIN — Medication 200 GRAM(S): at 17:10

## 2023-09-06 RX ADMIN — INSULIN GLARGINE 40 UNIT(S): 100 INJECTION, SOLUTION SUBCUTANEOUS at 21:19

## 2023-09-06 RX ADMIN — SODIUM ZIRCONIUM CYCLOSILICATE 10 GRAM(S): 10 POWDER, FOR SUSPENSION ORAL at 19:42

## 2023-09-06 RX ADMIN — Medication 4 UNIT(S): at 23:01

## 2023-09-06 NOTE — ED PROVIDER NOTE - ATTENDING APP SHARED VISIT CONTRIBUTION OF CARE
54 y/o female with pmhx of CKD not on dialysis, DM, HTN, sarcoidosis sent to ED for initiation of dialysis. Pt states her nephrologist Dr. Jose M Garibay sent her to ED. Per nephrology, pt has creatinine levels of 9 and elevated potassium. Of note, pt fingerstick high in ED. Pt admits to sometimes taking her insulin, but did not take any today and only intermittently takes it. Did not take it today. Pt c/o fatigue and loss of appetite x few days. Chronic leg swelling that is mild. Pt denies cp, sob, cough, abd pain, n/v/d, fever, chills.  pt appearsin nad, will labs, lytes, ro DKA  admit for HD  plan for permacath tomrorow

## 2023-09-06 NOTE — PHYSICAL EXAM
[General Appearance - Alert] : alert [General Appearance - In No Acute Distress] : in no acute distress [General Appearance - Well Nourished] : well nourished [Sclera] : the sclera and conjunctiva were normal [Outer Ear] : the ears and nose were normal in appearance [Jugular Venous Distention Increased] : there was no jugular-venous distention [Respiration, Rhythm And Depth] : normal respiratory rhythm and effort [Auscultation Breath Sounds / Voice Sounds] : lungs were clear to auscultation bilaterally [Heart Sounds] : normal S1 and S2 [Heart Sounds Gallop] : no gallops [Heart Sounds Pericardial Friction Rub] : no pericardial rub [Abdomen Soft] : soft [Abdomen Tenderness] : non-tender [No CVA Tenderness] : no ~M costovertebral angle tenderness [Abnormal Walk] : normal gait [Nail Clubbing] : no clubbing  or cyanosis of the fingernails [] : no rash [Oriented To Time, Place, And Person] : oriented to person, place, and time [Affect] : the affect was normal [Mood] : the mood was normal [FreeTextEntry1] : B/L LE: pitting edema

## 2023-09-06 NOTE — ED ADULT NURSE NOTE - NSFALLHARMRISKINTERV_ED_ALL_ED
Please call patient to offer sooner available spot with Dr Demario Daniel for [facility] next week Assistance OOB with selected safe patient handling equipment if applicable/Assistance with ambulation/Communicate risk of Fall with Harm to all staff, patient, and family/Monitor gait and stability/Provide visual cue: red socks, yellow wristband, yellow gown, etc/Reinforce activity limits and safety measures with patient and family/Use of alarms - bed, stretcher, chair and/or video monitoring/Bed in lowest position, wheels locked, appropriate side rails in place/Call bell, personal items and telephone in reach/Instruct patient to call for assistance before getting out of bed/chair/stretcher/Non-slip footwear applied when patient is off stretcher/[place] to call system/Physically safe environment - no spills, clutter or unnecessary equipment/Purposeful Proactive Rounding/Room/bathroom lighting operational, light cord in reach

## 2023-09-06 NOTE — ED ADULT TRIAGE NOTE - CHIEF COMPLAINT QUOTE
Sent by MD for dialysis, as per pt her kidney function is low pt stated eamon. lower ext. swelling denies CP, no dizziness.  FS=Hi pt states she did not take her insulin today.

## 2023-09-06 NOTE — ED PROVIDER NOTE - NSICDXFAMILYHX_GEN_ALL_CORE_FT
HEARING EVALUATION    Name:  Vivien Barros  :  [id number]  Age:  70 Karissa Haynes , CCC-A  Clinical Audiologist
FAMILY HISTORY:  Family history of diabetes mellitus in first degree relative

## 2023-09-06 NOTE — ED ADULT NURSE NOTE - OBJECTIVE STATEMENT
Received pt in bed A and Ox 3 in NAD resting comfortably, c/o  high blood sugar,  PERRLA extremities are strong and equal B/l no ketosis like smell note don breath, abd soft non distended non tender, pt  reports here to have dialysis done, breathing is even and unlabored lung sounds celar B/l, some pedal edema noted to legs B/l. non pitting, IV intimated labs drawn and sent, mediated as per order, pt is on monitor with sinus rhythm noted.

## 2023-09-06 NOTE — HISTORY OF PRESENT ILLNESS
[FreeTextEntry1] : HPI: 52-year-old female with advanced CKD in the setting of longstanding history of insulin-dependent DM, sarcoidosis (biopsy proven in 2010), proteinuria, seizure disorder and hypercalcemia presents for follow-up visit. Pt. was last seen in clinic on 7/12/23.   Kidney history: Pt. with previous history of MIKALA in the setting of hypercalcemia. Pt. was hospitalized at [facility] from 9/25/17 to 10/3/17. At that time, the patient was admitted with Scr of 5.65 and serum calcium of 15.3 on 09/25/17. Scr improved to 3.48 and serum calcium decreased to 10.7 on 10/03/17. Scr improved 1.44 and serum calcium was WNL at 10.1 on labs done on 9/24/18. Scr remained stable at 1.41 on 8/9/2021. Scr increased to 3.36 on 9/12/22. Pt with history of long standing uncontrolled DM. Scr was 2.95 on labs done on 11/1/22. Pt was initiated on PO Torsemide 10 mg once daily for B/L LE edema. Scr increased to 4.19 on labs done during clinic visit on 1/11/23. Torsemide dose was decreased to 5 mg once daily in view of worsening Scr and resolution of LE swelling. Pt. was admitted to [facility] (1/25/23-1/31/23) for worsening B/L LE chronic pain. Scr was elevated at 4.9 on 1/25/23 and remained elevated at 4.7 on 1/30/23. Pt. was seen by our inpatient nephrology team. Torsemide and Losartan were held during hospitalization and upon discharge. Pt. reported worsening of LE swelling with feeling of intermittent SOB after ~10 days of discharge from [facility]. Pt. was advised to initiate PO Torsemide 10 mg once daily on 2/13/23. Pt. developed worsening LE swelling and SOB and was subsequently admitted at [facility] (2/20/23-2/25/23). Scr was elevated at 5.15 on 2/20/23 and remained elevated at 6.27 on 2/25/23. Pt was seen was seen by our inpatient nephrology team at that time. Pt. received Bumex infusion and was later transitioned to PO Bumex 2 mg twice daily upon discharge. Pt. was advised to initiate long term RRT at that time, however pt refused. Pt. with significant B/L LE swelling on exam during clinic visit on 3/29/23. PO Bumex was increased to 2 mg TID at that time. Labs done on 4/5/23 showed an elevated serum potassium level of 5.5 and an elevated Scr of 6.38. Pt. was advised to go to the ER for further evaluation and initiation of HD however refused to go to the ER. Pt. initiated on oral Metolazone 2.5 mg on 4/13/23 for worsening B/L leg edema, increased to 5 mg BID on 5/10/23. Last Scr increased to 8.84 on labs performed on 8/23/23.  Pt. reports decreased appetite and fatigue at times. Otherwise feels well. Pt. was advised to go to the ER multiple times in the past to initiate HD. However, pt. continues to refuse initiation of HD. No fever, nausea, vomiting, CP, SOB, HA or dizziness during clinic visit today.

## 2023-09-06 NOTE — CONSULT NOTE ADULT - SUBJECTIVE AND OBJECTIVE BOX
CHIEF COMPLAINT:     53 year-old Gayanes[place] female with PMHx of CKD (not on dialysis, +urine output), DM (insulin/lantus) HTN, and sarcoidosis who presented to ED from Nephrology outpatient office for elevated creatinine and initiation of dialysis. Pt and her daughter at bedside. Pt states she follows Dr. Jose M Garibay weekly and was at her Nephrology appointment when Dr. Jose M Garibay recommended she go to the ED to initiate dialysis given the significant elevated in creatinine (9) and hyperkalemia. Upon arrival, pt also found to have FSG ~660s, stating she did not take her insulin or Lantus > 48 hours secondary to significant neuropathic pain. She is awake, alert, and complaining of mild fatigue and loss of appetite but denies any changes in mental status, chest pain, palpitations, SOB, cough, abdominal pain, N/V/D, fever or chills. Regarding her renal diseases, she states she does make urine at home and has been in discussion of initiating dialysis in the near future, however was urgently sent by her Nephrologist.    In ED, pts vitals: HR tachycardic 100s, BP hypertensive 170/90s, RR 14 on room air, afebrile. EKG significant for slightly peaked T waves, correlating to hyperkalemia of 5.7. Glucose significant for ~660 finger stick metabolic acidosis 7.33, BHB1, anion gap 26. No leukocytosis 9.32, Anemia (possibly chronic i/s/o CKD) 7.9. UA sign, corrected na i/s/o hyperglycemia. A1c 10, CXR BL hazy opacities reduced from prior. Pt received 500 IV bolus x1 followed by insulin stat and lantus (see below) as well as calcium gluconate and lokelma for hyperkalemia. MICU consulted for evaluation of r/o DKA and indication for urgent dialysis.     Upon evaluation with MICU resident and attending, pt sitting upright with pt's daughter in ED, stable VSS and improved glucose.     Glucose: Improved from 660s to 400 and now ~200s s/p Lantus 40 and stat insulin IV 5  Hyperkalemia: 5.7 improved to 4.7 s/p cocktail, repeat EKG improved from prior   Acidosis: improving with fluids and management of above   Urine output: Increased when compared to prior to admission       FAMILY HISTORY:  Family history of diabetes mellitus in first degree relative   parents from liver cirrhosis (father) and hepatitis C (mother)      SOCIAL HISTORY:  No h/o of illicit drug use, tobacco use or alcohol.   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 No nausea, vomiting, or hematemesis; No diarrhea or constipation. No melena or hematochezia.  GENITOURINARY: No dysuria, frequency or hematuria  NEUROLOGICAL: +foot nerve tingling sensation consistent with neuropathy;  No numbness or weakness  SKIN: No itching, rashes    [x ] All other systems negative  [ ] Unable to assess ROS because ________    OBJECTIVE:  ICU Vital Signs Last 24 Hrs  T(C): 36.7 (06 Sep 2023 20:44), Max: 37 (06 Sep 2023 15:20)  T(F): 98 (06 Sep 2023 20:44), Max: 98.6 (06 Sep 2023 15:20)  HR: 102 (06 Sep 2023 20:44) (11 - 103)  BP: 173/90 (06 Sep 2023 20:44) (156/86 - 173/90)  BP(mean): --  ABP: --  ABP(mean): --  RR: 18 (06 Sep 2023 20:44) (18 - 20)  SpO2: 100% (06 Sep 2023 20:44) (100% - 100%)    O2 Parameters below as of 06 Sep 2023 20:44  Patient On (Oxygen Delivery Method): room air              CAPILLARY BLOOD GLUCOSE      POCT Blood Glucose.: 370 mg/dL (06 Sep 2023 22:52)      PHYSICAL EXAM:  GENERAL: NAD, well-developed  HEAD:  Atraumatic, Normocephalic  EYES: EOMI, PERRLA, conjunctiva and sclera clear  NECK: Supple, No JVD  CHEST/LUNG: Clear to auscultation bilaterally; No wheeze  HEART: Regular rate and rhythm; No murmurs, rubs, or gallops  ABDOMEN: Soft, Nontender, Nondistended; Bowel sounds present  EXTREMITIES:  2+ Peripheral Pulses, No clubbing, cyanosis, or edema  PSYCH: AAOx3  NEUROLOGY: non-focal  SKIN: No rashes or lesions      HOSPITAL MEDICATIONS:  MEDICATIONS  (STANDING):    MEDICATIONS  (PRN):      LABS:                        7.9    9.32  )-----------( 273      ( 06 Sep 2023 17:15 )             23.1         130<L>  |  89<L>  |  106<H>  ----------------------------<  506<HH>  4.7   |  19<L>  |  9.64<H>    Ca    8.7      06 Sep 2023 20:47    TPro  8.4<H>  /  Alb  4.0  /  TBili  0.2  /  DBili  x   /  AST  11  /  ALT  10  /  AlkPhos  62        Urinalysis Basic - ( 06 Sep 2023 22:30 )    Color: Yellow / Appearance: Clear / S.016 / pH: x  Gluc: x / Ketone: Negative mg/dL  / Bili: Negative / Urobili: 0.2 mg/dL   Blood: x / Protein: >=1000 mg/dL / Nitrite: Negative   Leuk Esterase: Negative / RBC: 3 /HPF / WBC 1 /HPF   Sq Epi: x / Non Sq Epi: 3 /HPF / Bacteria: Negative /HPF        Venous Blood Gas:   @ 22:30  7.34/40/51/22/82.2  VBG Lactate: 2.5  Venous Blood Gas:   @ 17:15  7.33/39/56/21/86.2  VBG Lactate: 1.9      MICROBIOLOGY:     RADIOLOGY:  [x ] Reviewed and interpreted by me    EKG:

## 2023-09-06 NOTE — CONSULT NOTE ADULT - ATTENDING COMMENTS
52 yo with CKD4, makes urine, DM on insulin, sarcoidosis, presents for initiation of HD and found to be hyperglycemic 2/2 not taking her insulin over the last 24-48 hours. Glucose 600's BHB 1 bicarb 18  vbg pH 7.33, she was given 5u short acting insulin sq for glucose and for hyperkalemia 5.7.  Peaked Ts on EKG.  Repeat K 4.7, EKG improved.  Also received 40U lantus and 500cc crystalloid.  Glucose improved to 200's.  No evidence of acute infection.  MS good, hemodynamically stable  [facility] mixed etiology: renal failure and hyperglycemia/very mild DKA now resolved  Would continue sq basal bolus with SS coverage; no need for insulin gtt at this time; judicious ivf iso renal failure  Plans for HD per nephrology  Pt does not require MICU level of care.  D/W ED team

## 2023-09-06 NOTE — ED ADULT NURSE REASSESSMENT NOTE - NS ED NURSE REASSESS COMMENT FT1
Critical value of blood glucose  685 given to MARYANN Landaverde, read back value obtained correctly.

## 2023-09-06 NOTE — ED PROVIDER NOTE - CARE PLAN
Principal Discharge DX:	Diabetes mellitus with hyperglycemia  Secondary Diagnosis:	Hyperkalemia  Secondary Diagnosis:	ESRD needing dialysis   1

## 2023-09-06 NOTE — ED PROVIDER NOTE - OBJECTIVE STATEMENT
52 y/o female with pmhx of CKD not on dialysis, DM, HTN, sarcoidosis sent to ED for initiation of dialysis. Pt states her nephrologist Dr. Betty Garibay sent her to ED. Per nephrology, pt has creatinine levels of 9 and elevated potassium. Of note, pt fingerstick high in ED. Pt admits to sometimes taking her insulin. Did not take it today. Pt c/o fatigue and loss of appetite x few days. Chronic leg swelling that is mild. Pt denies cp, sob, cough, abd pain, n/v/d, fever, chills. 52 y/o female with pmhx of CKD not on dialysis, DM, HTN, sarcoidosis sent to ED for initiation of dialysis. Pt states her nephrologist Dr. Jose M Garibay sent her to ED. Per nephrology, pt has creatinine levels of 9 and elevated potassium. Of note, pt fingerstick high in ED. Pt admits to sometimes taking her insulin. Did not take it today. Pt c/o fatigue and loss of appetite x few days. Chronic leg swelling that is mild. Pt denies cp, sob, cough, abd pain, n/v/d, fever, chills.

## 2023-09-06 NOTE — REVIEW OF SYSTEMS
[Lower Ext Edema] : lower extremity edema [As Noted in HPI] : as noted in HPI [Negative] : Heme/Lymph [Fever] : no fever [Chills] : no chills [Feeling Tired] : not feeling tired [Eye Pain] : no eye pain [Eyesight Problems] : no eyesight problems [Earache] : no earache [Sore Throat] : no sore throat [Heart Rate Is Slow] : the heart rate was not slow [Heart Rate Is Fast] : the heart rate was not fast [Chest Pain] : no chest pain [Palpitations] : no palpitations [Shortness Of Breath] : no shortness of breath [Cough] : no cough [Abdominal Pain] : no abdominal pain [Vomiting] : no vomiting [Dysuria] : no dysuria [Arthralgias] : no arthralgias [Limb Swelling] : no limb swelling [Skin Lesions] : no skin lesions [Skin Wound] : no skin wound [Confused] : no confusion [Convulsions] : no convulsions [Depression] : no depression [Muscle Weakness] : no muscle weakness [FreeTextEntry2] : insomnia [FreeTextEntry7] : decreased appetite [de-identified] : chronic LE neuropathy

## 2023-09-06 NOTE — ED PROVIDER NOTE - CLINICAL SUMMARY MEDICAL DECISION MAKING FREE TEXT BOX
52 y/o female with pmhx of CKD not on dialysis, DM, HTN, sarcoidosis sent to ED for initiation of dialysis. Pt states her nephrologist Dr. Betty Garibay sent her to ED. Per nephrology, pt has creatinine levels of 9 and elevated potassium. Of note, pt fingerstick high in ED. Pt admits to sometimes taking her insulin. Did not take it today. Pt c/o fatigue and loss of appetite x few days. Chronic leg swelling that is mild. pt well appearing, abd soft NT, lungs clear, no LE edema. plan to check labs r/o DKA, admit for dialysis

## 2023-09-06 NOTE — ED PROVIDER NOTE - INTERPRETATION
WORK STATUS REPORT    September 17, 2019      Re:   Rigoberto Nicole  [address]  [id number] [place] [id number]                    To Whom it may concern:    This is to certify that Rigoberto Nicole was evaluated today, 9/17/19, by Dr Khan at the [facility]. He is recommended the following work restrictions:    RESTRICTIONS: 50 lb lifting restriction from today until 2 week; No restrictions at work after 2 weeks.    REMARKS: Patient will follow only as needed with Dr Erin Khan MD  [id number] [place] [id number] [phone]      
normal

## 2023-09-06 NOTE — END OF VISIT
[FreeTextEntry3] : I was physically present for the key portions of the evaluation and management (E/M) service provided. I agree with the above history, ROS, physical exam, assessment and plan which I have reviewed and edited where appropriate. I have also reviewed the assessment and management of CKD, HTN and LE edema with the patient during clinic visit today.  Pt. with advanced CKD stage 5. Last Scr elevated at 8.84 on 8/23/23. Pt. agrees to go to [facility] today for long-term HD initiation. Pt. with B/L LE edema, on diuretic therapy. Pt. with elevated BP readings in clinic today, oral Metoprolol was added on 8/23/23. Low salt diet and fluid restriction advised. Avoid nephrotoxins.

## 2023-09-06 NOTE — ASSESSMENT
[FreeTextEntry1] : 1. CKD Stage 5/Proteinuria: Pt. with advanced/progressive CKD in setting of uncontrolled DM. Pt. with most likely diabetic nephropathy. Last Scr increased to 8.84 on labs performed on 8/23/23. Pt. was advised to go to the ER multiple times in the past for initiation of HD. Pt. however continues to refuse HD initiation. Pt. currently enrolled in [facility] in CKD program. Various options for RRT including HD, PD and kidney transplantation have been reviewed with the patient and her daughter (several times) during previous clinic visits. Pt. advised again to go to the ER today to initiate dialysis. Pt. states she will go to the ER later today but needs to go home first. Labs to be checked in ER. Avoid NSAIDs/nephrotoxins.    2. HTN: Pt. with elevated BP readings during clinic visit today. Oral Metoprolol Succinate ER 25 mg once daily was added on 8/23/23. Low salt diet advised. Monitor BP daily at home.  3. LE edema: Pt. currently receiving Bumex 2 mg BID and Metolazone 5 mg BID (initiated on 2.5 mg BID on 4/13/23, dose increased to 5 mg BID on 5/10/23). Pt. with B/L LE edema on exam today. Continue with Bumex and Metolazone at current dose. Low salt diet and fluid restriction advised. Monitor body weight.   Pt. was advised to go to the ER again today. Pt. agreeable to go to the ER later today to initiate hemodialysis.

## 2023-09-06 NOTE — ED ADULT TRIAGE NOTE - BP NONINVASIVE DIASTOLIC (MM HG)
Arm improving.  Taking antibiotics.  Minimal discharge.  Mildly tender.  Erythema improved.  Interested in resuming ADD med.  Used to take Vyvanse.  Not sure helped as well.  Would like to do wellness exam at later date.  Interested in HPV vaccine trial of Adderall XR.  JOSÉ MIGUEL BURCH reviewed     86

## 2023-09-07 ENCOUNTER — TRANSCRIPTION ENCOUNTER (OUTPATIENT)
Age: 53
End: 2023-09-07

## 2023-09-07 VITALS
SYSTOLIC BLOOD PRESSURE: 149 MMHG | DIASTOLIC BLOOD PRESSURE: 85 MMHG | HEART RATE: 113 BPM | OXYGEN SATURATION: 100 % | RESPIRATION RATE: 18 BRPM | TEMPERATURE: 98 F

## 2023-09-07 DIAGNOSIS — E87.20 ACIDOSIS, UNSPECIFIED: ICD-10-CM

## 2023-09-07 DIAGNOSIS — E11.65 TYPE 2 DIABETES MELLITUS WITH HYPERGLYCEMIA: ICD-10-CM

## 2023-09-07 DIAGNOSIS — T38.0X5A ADVERSE EFFECT OF GLUCOCORTICOIDS AND SYNTHETIC ANALOGUES, INITIAL ENCOUNTER: ICD-10-CM

## 2023-09-07 DIAGNOSIS — Z29.9 ENCOUNTER FOR PROPHYLACTIC MEASURES, UNSPECIFIED: ICD-10-CM

## 2023-09-07 DIAGNOSIS — E78.49 OTHER HYPERLIPIDEMIA: ICD-10-CM

## 2023-09-07 DIAGNOSIS — D86.9 SARCOIDOSIS, UNSPECIFIED: ICD-10-CM

## 2023-09-07 DIAGNOSIS — N18.6 END STAGE RENAL DISEASE: ICD-10-CM

## 2023-09-07 DIAGNOSIS — E11.9 TYPE 2 DIABETES MELLITUS WITHOUT COMPLICATIONS: ICD-10-CM

## 2023-09-07 DIAGNOSIS — Z79.899 OTHER LONG TERM (CURRENT) DRUG THERAPY: ICD-10-CM

## 2023-09-07 DIAGNOSIS — I10 ESSENTIAL (PRIMARY) HYPERTENSION: ICD-10-CM

## 2023-09-07 DIAGNOSIS — E11.40 TYPE 2 DIABETES MELLITUS WITH DIABETIC NEUROPATHY, UNSPECIFIED: ICD-10-CM

## 2023-09-07 LAB
24R-OH-CALCIDIOL SERPL-MCNC: 7.5 NG/ML — LOW (ref 30–80)
ALBUMIN SERPL ELPH-MCNC: 3.4 G/DL — SIGNIFICANT CHANGE UP (ref 3.3–5)
ALBUMIN SERPL ELPH-MCNC: 3.6 G/DL — SIGNIFICANT CHANGE UP (ref 3.3–5)
ALP SERPL-CCNC: 43 U/L — SIGNIFICANT CHANGE UP (ref 40–120)
ALP SERPL-CCNC: 47 U/L — SIGNIFICANT CHANGE UP (ref 40–120)
ALT FLD-CCNC: 10 U/L — SIGNIFICANT CHANGE UP (ref 4–33)
ALT FLD-CCNC: 8 U/L — SIGNIFICANT CHANGE UP (ref 4–33)
ANION GAP SERPL CALC-SCNC: 19 MMOL/L — HIGH (ref 7–14)
ANION GAP SERPL CALC-SCNC: 23 MMOL/L — HIGH (ref 7–14)
APTT BLD: 28.6 SEC — SIGNIFICANT CHANGE UP (ref 24.5–35.6)
AST SERPL-CCNC: 21 U/L — SIGNIFICANT CHANGE UP (ref 4–32)
AST SERPL-CCNC: 9 U/L — SIGNIFICANT CHANGE UP (ref 4–32)
B-OH-BUTYR SERPL-SCNC: <0 MMOL/L — SIGNIFICANT CHANGE UP (ref 0–0.4)
BASOPHILS # BLD AUTO: 0.02 K/UL — SIGNIFICANT CHANGE UP (ref 0–0.2)
BASOPHILS NFR BLD AUTO: 0.3 % — SIGNIFICANT CHANGE UP (ref 0–2)
BILIRUB SERPL-MCNC: <0.2 MG/DL — SIGNIFICANT CHANGE UP (ref 0.2–1.2)
BILIRUB SERPL-MCNC: <0.2 MG/DL — SIGNIFICANT CHANGE UP (ref 0.2–1.2)
BLD GP AB SCN SERPL QL: NEGATIVE — SIGNIFICANT CHANGE UP
BUN SERPL-MCNC: 109 MG/DL — HIGH (ref 7–23)
BUN SERPL-MCNC: 112 MG/DL — HIGH (ref 7–23)
CALCIUM SERPL-MCNC: 8.7 MG/DL — SIGNIFICANT CHANGE UP (ref 8.4–10.5)
CALCIUM SERPL-MCNC: 9.3 MG/DL — SIGNIFICANT CHANGE UP (ref 8.4–10.5)
CHLORIDE SERPL-SCNC: 92 MMOL/L — LOW (ref 98–107)
CHLORIDE SERPL-SCNC: 95 MMOL/L — LOW (ref 98–107)
CO2 SERPL-SCNC: 19 MMOL/L — LOW (ref 22–31)
CO2 SERPL-SCNC: 24 MMOL/L — SIGNIFICANT CHANGE UP (ref 22–31)
CREAT SERPL-MCNC: 9.49 MG/DL — HIGH (ref 0.5–1.3)
CREAT SERPL-MCNC: 9.96 MG/DL — HIGH (ref 0.5–1.3)
EGFR: 4 ML/MIN/1.73M2 — LOW
EGFR: 5 ML/MIN/1.73M2 — LOW
EOSINOPHIL # BLD AUTO: 0.19 K/UL — SIGNIFICANT CHANGE UP (ref 0–0.5)
EOSINOPHIL NFR BLD AUTO: 2.7 % — SIGNIFICANT CHANGE UP (ref 0–6)
FERRITIN SERPL-MCNC: 155 NG/ML — SIGNIFICANT CHANGE UP (ref 13–330)
GLUCOSE BLDC GLUCOMTR-MCNC: 101 MG/DL — HIGH (ref 70–99)
GLUCOSE BLDC GLUCOMTR-MCNC: 130 MG/DL — HIGH (ref 70–99)
GLUCOSE BLDC GLUCOMTR-MCNC: 163 MG/DL — HIGH (ref 70–99)
GLUCOSE SERPL-MCNC: 143 MG/DL — HIGH (ref 70–99)
GLUCOSE SERPL-MCNC: 168 MG/DL — HIGH (ref 70–99)
HCT VFR BLD CALC: 19.4 % — CRITICAL LOW (ref 34.5–45)
HCT VFR BLD CALC: 20.6 % — CRITICAL LOW (ref 34.5–45)
HGB BLD-MCNC: 6.9 G/DL — CRITICAL LOW (ref 11.5–15.5)
HGB BLD-MCNC: 7.3 G/DL — LOW (ref 11.5–15.5)
IANC: 4.94 K/UL — SIGNIFICANT CHANGE UP (ref 1.8–7.4)
IMM GRANULOCYTES NFR BLD AUTO: 1 % — HIGH (ref 0–0.9)
INR BLD: 0.91 RATIO — SIGNIFICANT CHANGE UP (ref 0.85–1.18)
IRON SATN MFR SERPL: 21 % — SIGNIFICANT CHANGE UP (ref 14–50)
IRON SATN MFR SERPL: 46 UG/DL — SIGNIFICANT CHANGE UP (ref 30–160)
LACTATE SERPL-SCNC: 2.1 MMOL/L — HIGH (ref 0.5–2)
LYMPHOCYTES # BLD AUTO: 1.25 K/UL — SIGNIFICANT CHANGE UP (ref 1–3.3)
LYMPHOCYTES # BLD AUTO: 17.9 % — SIGNIFICANT CHANGE UP (ref 13–44)
MAGNESIUM SERPL-MCNC: 3 MG/DL — HIGH (ref 1.6–2.6)
MAGNESIUM SERPL-MCNC: 3 MG/DL — HIGH (ref 1.6–2.6)
MCHC RBC-ENTMCNC: 28.2 PG — SIGNIFICANT CHANGE UP (ref 27–34)
MCHC RBC-ENTMCNC: 28.4 PG — SIGNIFICANT CHANGE UP (ref 27–34)
MCHC RBC-ENTMCNC: 35.4 GM/DL — SIGNIFICANT CHANGE UP (ref 32–36)
MCHC RBC-ENTMCNC: 35.6 GM/DL — SIGNIFICANT CHANGE UP (ref 32–36)
MCV RBC AUTO: 79.5 FL — LOW (ref 80–100)
MCV RBC AUTO: 79.8 FL — LOW (ref 80–100)
MONOCYTES # BLD AUTO: 0.5 K/UL — SIGNIFICANT CHANGE UP (ref 0–0.9)
MONOCYTES NFR BLD AUTO: 7.2 % — SIGNIFICANT CHANGE UP (ref 2–14)
NEUTROPHILS # BLD AUTO: 4.94 K/UL — SIGNIFICANT CHANGE UP (ref 1.8–7.4)
NEUTROPHILS NFR BLD AUTO: 70.9 % — SIGNIFICANT CHANGE UP (ref 43–77)
NRBC # BLD: 0 /100 WBCS — SIGNIFICANT CHANGE UP (ref 0–0)
NRBC # BLD: 0 /100 WBCS — SIGNIFICANT CHANGE UP (ref 0–0)
NRBC # FLD: 0 K/UL — SIGNIFICANT CHANGE UP (ref 0–0)
NRBC # FLD: 0 K/UL — SIGNIFICANT CHANGE UP (ref 0–0)
PHOSPHATE SERPL-MCNC: 10.1 MG/DL — HIGH (ref 2.5–4.5)
PHOSPHATE SERPL-MCNC: 9.3 MG/DL — HIGH (ref 2.5–4.5)
PLATELET # BLD AUTO: 256 K/UL — SIGNIFICANT CHANGE UP (ref 150–400)
PLATELET # BLD AUTO: 269 K/UL — SIGNIFICANT CHANGE UP (ref 150–400)
POTASSIUM SERPL-MCNC: 4.1 MMOL/L — SIGNIFICANT CHANGE UP (ref 3.5–5.3)
POTASSIUM SERPL-MCNC: 4.3 MMOL/L — SIGNIFICANT CHANGE UP (ref 3.5–5.3)
POTASSIUM SERPL-SCNC: 4.1 MMOL/L — SIGNIFICANT CHANGE UP (ref 3.5–5.3)
POTASSIUM SERPL-SCNC: 4.3 MMOL/L — SIGNIFICANT CHANGE UP (ref 3.5–5.3)
PROT SERPL-MCNC: 7.2 G/DL — SIGNIFICANT CHANGE UP (ref 6–8.3)
PROT SERPL-MCNC: 7.3 G/DL — SIGNIFICANT CHANGE UP (ref 6–8.3)
PROTHROM AB SERPL-ACNC: 10.2 SEC — SIGNIFICANT CHANGE UP (ref 9.5–13)
RBC # BLD: 2.43 M/UL — LOW (ref 3.8–5.2)
RBC # BLD: 2.59 M/UL — LOW (ref 3.8–5.2)
RBC # FLD: 11.9 % — SIGNIFICANT CHANGE UP (ref 10.3–14.5)
RBC # FLD: 11.9 % — SIGNIFICANT CHANGE UP (ref 10.3–14.5)
RH IG SCN BLD-IMP: NEGATIVE — SIGNIFICANT CHANGE UP
SODIUM SERPL-SCNC: 134 MMOL/L — LOW (ref 135–145)
SODIUM SERPL-SCNC: 138 MMOL/L — SIGNIFICANT CHANGE UP (ref 135–145)
TIBC SERPL-MCNC: 219 UG/DL — LOW (ref 220–430)
UIBC SERPL-MCNC: 173 UG/DL — SIGNIFICANT CHANGE UP (ref 110–370)
WBC # BLD: 6.97 K/UL — SIGNIFICANT CHANGE UP (ref 3.8–10.5)
WBC # BLD: 8.2 K/UL — SIGNIFICANT CHANGE UP (ref 3.8–10.5)
WBC # FLD AUTO: 6.97 K/UL — SIGNIFICANT CHANGE UP (ref 3.8–10.5)
WBC # FLD AUTO: 8.2 K/UL — SIGNIFICANT CHANGE UP (ref 3.8–10.5)

## 2023-09-07 PROCEDURE — 99223 1ST HOSP IP/OBS HIGH 75: CPT

## 2023-09-07 RX ORDER — GABAPENTIN 400 MG/1
100 CAPSULE ORAL THREE TIMES A DAY
Refills: 0 | Status: DISCONTINUED | OUTPATIENT
Start: 2023-09-07 | End: 2023-09-07

## 2023-09-07 RX ORDER — DEXTROSE 50 % IN WATER 50 %
25 SYRINGE (ML) INTRAVENOUS ONCE
Refills: 0 | Status: DISCONTINUED | OUTPATIENT
Start: 2023-09-07 | End: 2023-09-07

## 2023-09-07 RX ORDER — INSULIN LISPRO 100/ML
VIAL (ML) SUBCUTANEOUS AT BEDTIME
Refills: 0 | Status: DISCONTINUED | OUTPATIENT
Start: 2023-09-07 | End: 2023-09-07

## 2023-09-07 RX ORDER — METOPROLOL TARTRATE 50 MG
25 TABLET ORAL DAILY
Refills: 0 | Status: DISCONTINUED | OUTPATIENT
Start: 2023-09-07 | End: 2023-09-07

## 2023-09-07 RX ORDER — HEPARIN SODIUM 5000 [USP'U]/ML
5000 INJECTION INTRAVENOUS; SUBCUTANEOUS EVERY 8 HOURS
Refills: 0 | Status: DISCONTINUED | OUTPATIENT
Start: 2023-09-07 | End: 2023-09-07

## 2023-09-07 RX ORDER — INSULIN LISPRO 100/ML
8 VIAL (ML) SUBCUTANEOUS
Refills: 0 | Status: DISCONTINUED | OUTPATIENT
Start: 2023-09-07 | End: 2023-09-07

## 2023-09-07 RX ORDER — SODIUM CHLORIDE 9 MG/ML
1000 INJECTION, SOLUTION INTRAVENOUS
Refills: 0 | Status: DISCONTINUED | OUTPATIENT
Start: 2023-09-07 | End: 2023-09-07

## 2023-09-07 RX ORDER — INSULIN GLARGINE 100 [IU]/ML
28 INJECTION, SOLUTION SUBCUTANEOUS AT BEDTIME
Refills: 0 | Status: DISCONTINUED | OUTPATIENT
Start: 2023-09-07 | End: 2023-09-07

## 2023-09-07 RX ORDER — GLUCAGON INJECTION, SOLUTION 0.5 MG/.1ML
1 INJECTION, SOLUTION SUBCUTANEOUS ONCE
Refills: 0 | Status: DISCONTINUED | OUTPATIENT
Start: 2023-09-07 | End: 2023-09-07

## 2023-09-07 RX ORDER — ATORVASTATIN CALCIUM 80 MG/1
80 TABLET, FILM COATED ORAL AT BEDTIME
Refills: 0 | Status: DISCONTINUED | OUTPATIENT
Start: 2023-09-07 | End: 2023-09-07

## 2023-09-07 RX ORDER — GABAPENTIN 400 MG/1
100 CAPSULE ORAL
Refills: 0 | Status: DISCONTINUED | OUTPATIENT
Start: 2023-09-07 | End: 2023-09-07

## 2023-09-07 RX ORDER — HEPARIN SODIUM 5000 [USP'U]/ML
5000 INJECTION INTRAVENOUS; SUBCUTANEOUS EVERY 12 HOURS
Refills: 0 | Status: DISCONTINUED | OUTPATIENT
Start: 2023-09-07 | End: 2023-09-07

## 2023-09-07 RX ORDER — AMLODIPINE BESYLATE 2.5 MG/1
10 TABLET ORAL DAILY
Refills: 0 | Status: DISCONTINUED | OUTPATIENT
Start: 2023-09-07 | End: 2023-09-07

## 2023-09-07 RX ORDER — ACETAMINOPHEN 500 MG
975 TABLET ORAL ONCE
Refills: 0 | Status: COMPLETED | OUTPATIENT
Start: 2023-09-07 | End: 2023-09-07

## 2023-09-07 RX ORDER — FERROUS SULFATE 325(65) MG
325 TABLET ORAL DAILY
Refills: 0 | Status: DISCONTINUED | OUTPATIENT
Start: 2023-09-07 | End: 2023-09-07

## 2023-09-07 RX ORDER — FLUTICASONE PROPIONATE 50 MCG
1 SPRAY, SUSPENSION NASAL
Qty: 0 | Refills: 0 | DISCHARGE

## 2023-09-07 RX ORDER — BUMETANIDE 0.25 MG/ML
2 INJECTION INTRAMUSCULAR; INTRAVENOUS
Refills: 0 | Status: DISCONTINUED | OUTPATIENT
Start: 2023-09-07 | End: 2023-09-07

## 2023-09-07 RX ORDER — INSULIN LISPRO 100/ML
VIAL (ML) SUBCUTANEOUS
Refills: 0 | Status: DISCONTINUED | OUTPATIENT
Start: 2023-09-07 | End: 2023-09-07

## 2023-09-07 RX ORDER — DICLOFENAC SODIUM 30 MG/G
1 GEL TOPICAL
Qty: 0 | Refills: 0 | DISCHARGE

## 2023-09-07 RX ORDER — FAMOTIDINE 10 MG/ML
1 INJECTION INTRAVENOUS
Qty: 0 | Refills: 0 | DISCHARGE

## 2023-09-07 RX ORDER — DEXTROSE 50 % IN WATER 50 %
15 SYRINGE (ML) INTRAVENOUS ONCE
Refills: 0 | Status: DISCONTINUED | OUTPATIENT
Start: 2023-09-07 | End: 2023-09-07

## 2023-09-07 RX ORDER — SEVELAMER CARBONATE 2400 MG/1
1600 POWDER, FOR SUSPENSION ORAL
Refills: 0 | Status: DISCONTINUED | OUTPATIENT
Start: 2023-09-07 | End: 2023-09-07

## 2023-09-07 RX ORDER — ALBUTEROL 90 UG/1
2 AEROSOL, METERED ORAL EVERY 4 HOURS
Refills: 0 | Status: DISCONTINUED | OUTPATIENT
Start: 2023-09-07 | End: 2023-09-07

## 2023-09-07 RX ORDER — SODIUM BICARBONATE 1 MEQ/ML
650 SYRINGE (ML) INTRAVENOUS THREE TIMES A DAY
Refills: 0 | Status: DISCONTINUED | OUTPATIENT
Start: 2023-09-07 | End: 2023-09-07

## 2023-09-07 RX ORDER — GABAPENTIN 400 MG/1
200 CAPSULE ORAL DAILY
Refills: 0 | Status: DISCONTINUED | OUTPATIENT
Start: 2023-09-07 | End: 2023-09-07

## 2023-09-07 RX ORDER — DEXTROSE 50 % IN WATER 50 %
12.5 SYRINGE (ML) INTRAVENOUS ONCE
Refills: 0 | Status: DISCONTINUED | OUTPATIENT
Start: 2023-09-07 | End: 2023-09-07

## 2023-09-07 RX ORDER — HYDRALAZINE HCL 50 MG
100 TABLET ORAL THREE TIMES A DAY
Refills: 0 | Status: DISCONTINUED | OUTPATIENT
Start: 2023-09-07 | End: 2023-09-07

## 2023-09-07 RX ORDER — INSULIN LISPRO 100/ML
7 VIAL (ML) SUBCUTANEOUS
Refills: 0 | Status: DISCONTINUED | OUTPATIENT
Start: 2023-09-07 | End: 2023-09-07

## 2023-09-07 RX ADMIN — SEVELAMER CARBONATE 1600 MILLIGRAM(S): 2400 POWDER, FOR SUSPENSION ORAL at 08:47

## 2023-09-07 RX ADMIN — BUMETANIDE 2 MILLIGRAM(S): 0.25 INJECTION INTRAMUSCULAR; INTRAVENOUS at 13:31

## 2023-09-07 RX ADMIN — Medication 975 MILLIGRAM(S): at 13:30

## 2023-09-07 RX ADMIN — Medication 325 MILLIGRAM(S): at 13:29

## 2023-09-07 RX ADMIN — Medication 100 MILLIGRAM(S): at 07:47

## 2023-09-07 RX ADMIN — Medication 10 MILLIGRAM(S): at 07:47

## 2023-09-07 RX ADMIN — AMLODIPINE BESYLATE 10 MILLIGRAM(S): 2.5 TABLET ORAL at 07:47

## 2023-09-07 RX ADMIN — GABAPENTIN 200 MILLIGRAM(S): 400 CAPSULE ORAL at 13:29

## 2023-09-07 RX ADMIN — BUMETANIDE 2 MILLIGRAM(S): 0.25 INJECTION INTRAMUSCULAR; INTRAVENOUS at 07:47

## 2023-09-07 RX ADMIN — Medication 8 UNIT(S): at 08:45

## 2023-09-07 RX ADMIN — Medication 100 MILLIGRAM(S): at 13:30

## 2023-09-07 NOTE — PROVIDER CONTACT NOTE (OTHER) - ASSESSMENT
Patient and family upset that patient did not get a private room and scheduled IR shiley placement was not under general sedation. Char WOLF and attending collaborated with IR after speaking to patient and stated that procedure would have to be pushed back if sedation is required due to patient not being NPO. AMN Melchor spoke with patient regarding lack of room availability. Patient and mostly daughter upset and decided to wait. PA and attending both educated patient who is alert and oriented to person, place, and situation the risks of leave against medical advice. Patient signed AMA and it was placed in the charge. Discharge paperwork given to patient. PAtient and daughter decided to leave unit without waiting for transport to wheel them down. IV access removed.
Patient is asymptomatic.

## 2023-09-07 NOTE — CHART NOTE - NSCHARTNOTEFT_GEN_A_CORE
IR Pre-Procedure Note    Patient Age: 53y    Patient Gender: Female    Procedure (including site / side if known): non-tunneled HD cath     Diagnosis / Indication: Patient is a 53y old  Female who presents with a chief complaint of dialysis initiation (07 Sep 2023 11:21)    Interventional Radiology Attending Physician: Dr. Mcfarland     Ordering Attending Physician: Dr. Gerard    PAST MEDICAL & SURGICAL HISTORY:  Sarcoidosis  Diabetes  Hypertension  Hyperlipidemia  Chronic kidney disease (CKD), stage III (moderate)  Seizure disorder  No significant past surgical history    Pertinent Labs:   CBC Full  -  ( 07 Sep 2023 09:32 )  WBC Count : 8.20 K/uL  RBC Count : 2.59 M/uL  Hemoglobin : 7.3 g/dL  Hematocrit : 20.6 %  Platelet Count - Automated : 256 K/uL  Mean Cell Volume : 79.5 fL  Mean Cell Hemoglobin : 28.2 pg  Mean Cell Hemoglobin Concentration : 35.4 gm/dL  Auto Neutrophil # : x  Auto Lymphocyte # : x  Auto Monocyte # : x  Auto Eosinophil # : x  Auto Basophil # : x  Auto Neutrophil % : x  Auto Lymphocyte % : x  Auto Monocyte % : x  Auto Eosinophil % : x  Auto Basophil % : x    09-07    138  |  95<L>  |  109<H>  ----------------------------<  168<H>  4.1   |  24  |  9.96<H>    Ca    8.7      07 Sep 2023 06:50  Phos  10.1     09-07  Mg     3.00     09-07    TPro  7.2  /  Alb  3.4  /  TBili  <0.2  /  DBili  x   /  AST  9   /  ALT  8   /  AlkPhos  43  09-07    PT/INR - ( 07 Sep 2023 06:50 )   PT: 10.2 sec;   INR: 0.91 ratio         PTT - ( 07 Sep 2023 06:50 )  PTT:28.6 sec    Patient / Family aware of procedure:   [X] Y   [  ] N    Char Dowling PA-C  Department of Medicine   In-house pager #[phone]

## 2023-09-07 NOTE — CHART NOTE - NSCHARTNOTEFT_GEN_A_CORE
Called by nurse to evaluate patient who wishes to leave the hospital against medical advice. Spoke with patient and patient's daughter at bedside with Dr. Gerard. Patient has capacity to make her own medical decisions. Pt was informed of their medical conditions, benefits, and alternatives to treatment as well as the risks of refusing treatment and the seriousness of leaving against medical advice such as the risks of worsening kidney function, electrolytes imbalances, cardiac arrhythmias and even death were fully explained to the patient.  After expressing understanding, patient then signs out against medical advice.     Char Dowling PA-C  Department of Medicine   In-house pager #[phone]

## 2023-09-07 NOTE — PHARMACOTHERAPY INTERVENTION NOTE - COMMENTS
Medication history is complete. Medication list/dosages confirmed with outpatient pharmacy and patient at bedside.   Medication list updated in Outpatient Medication Record (OMR).  Of Note:   - Patient reports on Trulicity 0.75mg once a week (last dose ~ 1.5 weeks ago - unsure which day)

## 2023-09-07 NOTE — CHART NOTE - NSCHARTNOTEFT_GEN_A_CORE
Patient seen and examined. Expressed frustration over current room, Shiley not placed yet and pain in her feet. Had not taken Insulin prior to hospitalization x 2 days because of pain in her feet and trying to get to the hospital.  Nephrology, IR and Endocrine consulted  Awaiting Shiley placement and HD initiation   SW to assist with HD placement   Med rec done, medications started as appropriate   Plan per H&P Patient seen and examined. Expressed frustration over current room, Shiley not placed yet and pain in her feet. Had not taken Insulin prior to hospitalization x 2 days because of pain in her feet and trying to get to the hospital.  Nephrology, IR and Endocrine consulted  Awaiting Shiley placement and HD initiation   SW to assist with HD placement   Med rec done, medications started as appropriate   Plan per H&P    Addendum: D/w Dr. Mcfarland from IR, unable to get tunneled catheter w/o sedation given degree of anemia. Discussed with patient that she still can have catheter placed today with Ativan instead of full sedation. Patient's daughter began yelling, used expletives and adamantly refused catheter placement. Patient stated understanding of stated risks of leaving AMA which includes but is not limited to becoming hypervolemic, electrolyte abnl including hyperkalemia that can cause fatal arrythmia and death. AMA paperwork signed. Patient seen and examined. Expressed frustration over current room, Shiley not placed yet and pain in her feet. Had not taken Insulin prior to hospitalization x 2 days because of pain in her feet and trying to get to the hospital.  Nephrology, IR and Endocrine consulted  Awaiting Shiley placement and HD initiation   SW to assist with HD placement   Med rec done, medications started as appropriate   Plan per H&P    Addendum: D/w Dr. Mcfarland from IR, unable to get tunneled catheter w/ sedation given degree of anemia. Discussed with patient that she still can have catheter placed today with Ativan instead of full sedation. Patient's daughter began yelling, used expletives and adamantly refused catheter placement. Patient stated understanding of stated risks of leaving AMA which includes but is not limited to becoming hypervolemic, electrolyte abnl including hyperkalemia that can cause fatal arrythmia and death. AMA paperwork signed.

## 2023-09-07 NOTE — CONSULT NOTE ADULT - SUBJECTIVE AND OBJECTIVE BOX
Interventional Radiology    Evaluate for Procedure: Non-tunneled catheter for dialysis     HPI: 53 year-old female with PMHx of CKD (not on dialysis, +urine output), DM (insulin/lantus) HTN, and sarcoidosis who presented to ED from Nephrology outpatient office for elevated creatinine and initiation of dialysis. Scr >9, K 5.7. glucose 667 AG 26. Repeat labs show BUN/Cr 109/9.96 and K 4.1. Seen by MICU who deemed no need for emergent dialysis. IR consulted for non-tunneled catheter for dialysis.     Allergies: No Known Allergies    Medications (Abx/Cardiac/Anticoagulation/Blood Products)    amLODIPine   Tablet: 10 milliGRAM(s) Oral (09-07 @ 07:47)  buMETAnide: 2 milliGRAM(s) Oral (09-07 @ 07:47)  hydrALAZINE: 100 milliGRAM(s) Oral (09-07 @ 07:47)    Data:  157.5  57.153  T(C): 36.4  HR: 110  BP: 121/82  RR: 16  SpO2: 100%    -WBC 8.20 / HgB 7.3 / Hct 20.6 / Plt 256  -Na 138 / Cl 95 /  / Glucose 168  -K 4.1 / CO2 24 / Cr 9.96  -ALT 8 / Alk Phos 43 / T.Bili <0.2  -INR 0.91 / PTT 28.6    Assessment/Plan:   53 year-old female with PMHx of CKD (not on dialysis, +urine output), DM (insulin/lantus) HTN, and sarcoidosis who presented to ED from Nephrology outpatient office for elevated creatinine and initiation of dialysis. Scr >9, K 5.7. glucose 667 AG 26. Repeat labs show BUN/Cr 109/9.96 and K 4.1. Seen by MICU who deemed no need for emergent dialysis. IR consulted for non-tunneled catheter for dialysis. Case discussed with Dr. Mcfarland.   -- IR will plan to perform non-tunneled catheter placement for dialysis  -- please complete IR pre-procedure note  -- please place IR procedure request order under Dr. Mcfarland

## 2023-09-07 NOTE — CONSULT NOTE ADULT - ASSESSMENT
53 year-old [place] female with PMHx of CKD (not on dialysis, +urine output), DM (insulin/lantus) HTN, and sarcoidosis who presents with acute on chronic worsening CKD requiring dialysis initiation as well as DKA Hyperglycemia r/o for missing DM medications > 48 hours.     #DM with Hyperglycemia   #Hyperkalemia #ESRD requiring dialysis  - Pt with hyperglycemia w/hx of DM A1c 10.4%, s/p insulin/lantus 40   - improved FSG from > 600s to ~200s   - improving anion gap from 22, 26  - recommend to monitor FSG q4-6 while NPO with endocrine consult for DM managements i/s/o CKD  - no indication for insulin gtt at this time  - IVF, however caution i/s/o CKD and renal failure requiring HD setup    #lactic acidosis   - trend lactate elevated from 1.9 to 2.5  - s/p 500 IV cc, strict I/O now with improved urine output and consider fluid resuscitation to prevent elevation of lactate, caution for fluid overload or respiratory changes     #ESRD requiring dialysis #hyperkalemia (resolved)  - electrolyte abnormalities improved with medication management   - return to baseline with urine output, monitor strict I/O and formal nephro consult  - resolved hyperkalemia with improved lab and EKG findings   - pt stable, no need for MICU admission for urgent dialysis  - pt pending medicine admission for HD cath placement and initiation of HD    - manage rest of care with primar    Case discussed with MICU attending  See attestation below for final recommendations     SM
53F DM2 complicated by DM neuropathy and advanced CKD now ESRD and presented for new HD. Glucose 600s on presentation.    1) Type 2 DM with hyperglycemia - HbA1c 10.4%  mild DKA on admission now improved after Lantus 40 units given last night, glucose 600s to 100s.  Home regimen: Basaglar 35 units qhs with missed doses, Novolog 10 units premeal with missed doses, Trulicity 1.5mg SQ weekly with missed doses, Charlie 2 reader and sensors. Patient skipping insulin doses often due to hypoglycemia and reduced appetite.  Chronic prednisone 10mg.    While inpatient:  Agree with reduce Lantus to 28 units qhs to avoid hypoglycemia, lower insulin requirements due to renal status.  Reduce Admelog to 7/7/7 units premeal TID  Add low scale premeal and low bedtime scale  carb consistent diet  RD consult  Discharge plan:  Adjusted basal bolus insulin doses (Basaglar and Novolog pens), with Trulicity 1.5mg SQ weekly likely to be resumed.  Will need additional prescription for Charlie 2 sensors. Has a reader.  Future new appointment scheduled with [facility] endocrinology Dr. Rosie Fagan ([place]) at [address] [place] [address] [address] on 11/28 at 1pm.  Patient is aware of this appointment.    2) DM neuropathy  patient with pain and numbness b/l feet, is very uncomfortable  can increase gabapentin to maximum for ESRD which is 300mg daily.    3) Hypertension  BP goal < 130/80  on amlodipine, hydralazine    4) Hyperlipidemia  continue with high intensity statin (here atorva 80mg, home rosuva 40mg).    5) Long term steroid  is n chronic prednisone 10mg for sarcoid. May have component of secondary/tertiary adrenal insufficiency due to long term steroid, would not stop abruptly.    Endocrine team consulted for uncontrolled diabetes. Patient is high risk with high level decision making due to uncontrolled diabetes which places patient at high risk for cardiovascular and cerebrovascular events. Patient with lability of glucose requiring close monitoring and insulin adjustments.P    Plan discussed with Dr. Moustapha Thorpe MD  Division of Endocrinology  Pager: [phone]    If after 6PM or before 9AM, or on weekends/holidays, please call endocrine answering service for assistance ([phone]).  For nonurgent matters email [email or web address] for assistance.

## 2023-09-07 NOTE — H&P ADULT - HISTORY OF PRESENT ILLNESS
53 year-old [place] female with PMHx of CKD (not on dialysis, +urine output), DM (insulin/lantus) HTN, and sarcoidosis who presented to ED from Nephrology outpatient office for elevated creatinine and initiation of dialysis. Scr >9, K 5.7. glucose 667 AG 26. Aside from mild nausea which had since passed at time of my exam, pt reports her USOH and baseline urine output. She did miss her insulin doses over last 2 days. Repeat labs follow lantus 50, lispo 4, 500cc IVF, shows BHB 0.0,  . CMP resulting. Pt lucid, without significant volume overload, normalized K, most recent pH 7.34. Seen by MICU who deemed no need for emergent dialysis

## 2023-09-07 NOTE — DISCHARGE NOTE NURSING/CASE MANAGEMENT/SOCIAL WORK - NSDCFUADDAPPT_GEN_ALL_CORE_FT
Follow up with your PCP and Nephrologist.     Future new appointment scheduled with [facility] endocrinology Dr. Rosie ClemensJennjuvenal Fagan at [address] on 11/28 at 1pm.

## 2023-09-07 NOTE — DISCHARGE NOTE NURSING/CASE MANAGEMENT/SOCIAL WORK - NSDCPEFALRISK_GEN_ALL_CORE
For information on Fall & Injury Prevention, visit: [email or web address] OR  [email or web address] OR  https://www.cdc.gov/steadi/patient.html

## 2023-09-07 NOTE — H&P ADULT - PROBLEM SELECTOR PLAN 7
will order meds pt can recall, defer med rec to pharmacy whom I've emailed will order meds pt can recall, defer med rec to pharmacy whom I've emailed  pt unsure if she takes coreg or NaBicarb. reports taking metolazone 7.5 bid

## 2023-09-07 NOTE — H&P ADULT - NSHPPHYSICALEXAM_GEN_ALL_CORE
PHYSICAL EXAM:      Constitutional: NAD, well-groomed, well-developed  HEENT:  EOMI, Normal Hearing, poor dentition  Neck: No LAD, No JVD  Back: Normal spine flexure, No CVA tenderness  Respiratory: CTAB  Cardiovascular: S1 and S2, RRR  Gastrointestinal: BS+, soft, NT/ND  Extremities: No appreciable  peripheral edema  Vascular: 2+ peripheral pulses  Neurological: A/O x 3, no focal deficits  Psychiatric: Normal mood, normal affect  Musculoskeletal: 5/5 strength b/l upper and lower extremities  Skin: No rashes

## 2023-09-07 NOTE — H&P ADULT - NSHPLABSRESULTS_GEN_ALL_CORE
7.9    9.32  )-----------( 273      ( 06 Sep 2023 17:15 )             23.1     09-07    134<L>  |  92<L>  |  112<H>  ----------------------------<  143<H>  4.3   |  19<L>  |  9.49<H>    Ca    9.3      07 Sep 2023 01:40  Phos  9.3     09-07  Mg     3.00     09-07    TPro  7.3  /  Alb  3.6  /  TBili  <0.2  /  DBili  x   /  AST  21  /  ALT  10  /  AlkPhos  47  09-07    CAPILLARY BLOOD GLUCOSE      POCT Blood Glucose.: 163 mg/dL (07 Sep 2023 01:25)  POCT Blood Glucose.: 310 mg/dL (06 Sep 2023 23:48)  POCT Blood Glucose.: 370 mg/dL (06 Sep 2023 22:52)  POCT Blood Glucose.: 475 mg/dL (06 Sep 2023 20:59)  POCT Blood Glucose.: >600 mg/dL (06 Sep 2023 15:24)      Urinalysis Basic - ( 07 Sep 2023 01:40 )    Color: x / Appearance: x / SG: x / pH: x  Gluc: 143 mg/dL / Ketone: x  / Bili: x / Urobili: x   Blood: x / Protein: x / Nitrite: x   Leuk Esterase: x / RBC: x / WBC x   Sq Epi: x / Non Sq Epi: x / Bacteria: x      Vital Signs Last 24 Hrs  T(C): 36.4 (07 Sep 2023 02:40), Max: 37 (06 Sep 2023 15:20)  T(F): 97.5 (07 Sep 2023 02:40), Max: 98.6 (06 Sep 2023 15:20)  HR: 104 (07 Sep 2023 02:40) (11 - 104)  BP: 131/76 (07 Sep 2023 02:40) (131/76 - 174/92)  BP(mean): --  RR: 16 (07 Sep 2023 02:40) (15 - 20)  SpO2: 100% (07 Sep 2023 02:40) (100% - 100%)    Parameters below as of 07 Sep 2023 02:40  Patient On (Oxygen Delivery Method): room air

## 2023-09-07 NOTE — DISCHARGE NOTE PROVIDER - NSDCFUADDAPPT_GEN_ALL_CORE_FT
Follow up with your PCP and Nephrologist.     Future new appointment scheduled with [facility] endocrinology Dr. Rosie ClemensJennjuvenal Fagan at [address] on 11/28 at 1pm.

## 2023-09-07 NOTE — H&P ADULT - PROBLEM SELECTOR PLAN 1
-will need renal and vascular called in am to initiate dialysis  -c/w diuretics, Is/os, phosph binder,  htn meds

## 2023-09-07 NOTE — H&P ADULT - PROBLEM SELECTOR PLAN 2
-resolved dka as BHB 0, glucose 143. Elevated AG maybe explained by esrd. lacaate 2.1   -c/w home insulin regimen; pt on 35 basaglar, 10 premeal; will reduce by 20% -resolved mild  dka as BHB 0, glucose 143. Elevated AG maybe explained by esrd. lactate 2.1   -c/w home insulin regimen; pt on 35 basaglar, 10 premeal; will reduce by 20%

## 2023-09-07 NOTE — DISCHARGE NOTE PROVIDER - HOSPITAL COURSE
54 yo f with esrd not on HD, poorly controlled IDDM sent by nephrologist for initiation of HD.  IR consulted for shiley. Renal consulted for new HD. Patient leaving against medical advice prior to shiley placement and initiation of HD. Follow up with nephrologist upon discharge.

## 2023-09-07 NOTE — PROVIDER CONTACT NOTE (OTHER) - SITUATION
Patient and family upset that patient did not get a private room and scheduled IR shiley placement was not under general sedation.
patient's blood pressure 174/89

## 2023-09-07 NOTE — DISCHARGE NOTE PROVIDER - NSDCCPCAREPLAN_GEN_ALL_CORE_FT
PRINCIPAL DISCHARGE DIAGNOSIS  Diagnosis: ESRD needing dialysis  Assessment and Plan of Treatment: It is recommended you start on dialysis in order to prevent worsening of kidney function. Please follow up with your nephrologist for management.

## 2023-09-07 NOTE — CONSULT NOTE ADULT - SUBJECTIVE AND OBJECTIVE BOX
HPI:  53 year-old [facility] female with PMHx of CKD (not on dialysis, +urine output), DM (insulin/lantus) HTN, and sarcoidosis who presented to ED from Nephrology outpatient office for elevated creatinine and initiation of dialysis. Scr >9, K 5.7. glucose 667 AG 26. Aside from mild nausea which had since passed at time of my exam, pt reports her USOH and baseline urine output. She did miss her insulin doses over last 2 days. Repeat labs follow lantus 50, lispo 4, 500cc IVF, shows BHB 0.0,  . CMP resulting. Pt lucid, without significant volume overload, normalized K, most recent pH 7.34. Seen by MICU who deemed no need for emergent dialysis.    Endocrine history:  53F with type 2 DM, advanced CKD told to present for new HD. Noted with glucose 600s and mild DKA on presentation. Anion gap 26 then downtrend to 19. Initial BHB 1.0 now 0 after insulin received overnight. pH 7.33.  Patient with DM2 for about 10 years, initially on pills now on insulin.  Follows with pcp, has future new visit scheduled with endocrinology Dr. Jenn Fagan 11/28.  Home regimen for DM is Basaglar 35 units qhs, skips frequently due to it causing hypoglycemia as low as 40s, might skip about 50% of the time.  Novolog 10 units premeals, has not taken in 1 week due to poor appetite, not eating.   Trulicity newer med for her, med rec reports 1.5mg SQ once weekly. Last dose 1.5 weeks ago. She denies side effects from this med.  Has a Freestyle Charlie 2 reader and sensors at home.  + painful DM neuropathy b/l, said she was given gabapentin which helps to some degree.  Daughter of patient at bedside and is upset about patient being in [facility] and not receiving a standard room.  Patient follows closely with outpatient nephrology.    PAST MEDICAL & SURGICAL HISTORY:  Sarcoidosis      Diabetes      Hypertension      Hyperlipidemia      Chronic kidney disease (CKD), stage III (moderate)      Seizure disorder      No significant past surgical history          FAMILY HISTORY:  Family history of diabetes mellitus in first degree relative        Social History:    albuterol 90 mcg/inh inhalation aerosol: 2 puff(s) inhaled every 4 hours, As Needed  amLODIPine 10 mg oral tablet: 1 tab(s) orally once a day  Basaglar KwikPen 100 units/mL subcutaneous solution: 35 unit(s) subcutaneous once a day (at bedtime)  bumetanide 2 mg oral tablet: 1 tab(s) orally 3 times a day  famotidine 20 mg oral tablet: 1 tab(s) orally once a day (at bedtime)  ferrous sulfate 325 mg (65 mg elemental iron) oral delayed release tablet: 1 tab(s) orally once a day  gabapentin 100 mg oral capsule: 1 cap(s) orally 3 times a day  hydrALAZINE 100 mg oral tablet: 1 tab(s) orally 3 times a day  metOLazone 2.5 mg oral tablet: 1 tab(s) orally once a day  metOLazone 5 mg oral tablet: 1 tab(s) orally once a day  metoprolol succinate 25 mg oral tablet, extended release: 1 tab(s) orally once a day  NovoLOG 100 units/mL injectable solution: 10 unit(s) injectable 3 times a day   predniSONE 10 mg oral tablet: 1 tab(s) orally once a day  rosuvastatin 40 mg oral tablet: 1 tab(s) orally once a day  sevelamer carbonate 800 mg oral tablet: 2 tab(s) orally 3 times a day (with meals)  sodium bicarbonate 650 mg oral tablet: 1 tab(s) orally 3 times a day  Trulicity Pen 0.75 mg/0.5 mL subcutaneous solution: 0.75 milligram(s) subcutaneously once a week (Patient reports last dose ~1.5 weeks ago - unsure which day).      MEDICATIONS  (STANDING):  acetaminophen     Tablet .. 975 milliGRAM(s) Oral once  amLODIPine   Tablet 10 milliGRAM(s) Oral daily  atorvastatin 80 milliGRAM(s) Oral at bedtime  buMETAnide 2 milliGRAM(s) Oral two times a day  dextrose 5%. 1000 milliLiter(s) (100 mL/Hr) IV Continuous <Continuous>  dextrose 5%. 1000 milliLiter(s) (50 mL/Hr) IV Continuous <Continuous>  dextrose 50% Injectable 25 Gram(s) IV Push once  dextrose 50% Injectable 25 Gram(s) IV Push once  dextrose 50% Injectable 12.5 Gram(s) IV Push once  ferrous    sulfate 325 milliGRAM(s) Oral daily  gabapentin 200 milliGRAM(s) Oral daily  glucagon  Injectable 1 milliGRAM(s) IntraMuscular once  heparin   Injectable 5000 Unit(s) SubCutaneous every 8 hours  hydrALAZINE 100 milliGRAM(s) Oral three times a day  insulin glargine Injectable (LANTUS) 28 Unit(s) SubCutaneous at bedtime  insulin lispro Injectable (ADMELOG) 8 Unit(s) SubCutaneous before dinner  insulin lispro Injectable (ADMELOG) 8 Unit(s) SubCutaneous before lunch  insulin lispro Injectable (ADMELOG) 8 Unit(s) SubCutaneous before breakfast  metolazone 7.5 milliGRAM(s) Oral <User Schedule>  predniSONE   Tablet 10 milliGRAM(s) Oral daily  sevelamer carbonate 1600 milliGRAM(s) Oral three times a day with meals    MEDICATIONS  (PRN):  dextrose Oral Gel 15 Gram(s) Oral once PRN Blood Glucose LESS THAN 70 milliGRAM(s)/deciliter      Allergies    No Known Allergies    Intolerances      Review of Systems:  Constitutional: No fever  Eyes: No blurry vision  Neuro: No tremors  HEENT: No pain  Cardiovascular: No chest pain, palpitations  Respiratory: No SOB, no cough  GI: No nausea, vomiting, abdominal pain  : No dysuria  Skin: no rash  Psych: no depression  Endocrine: no polyuria, polydipsia  Hem/lymph: no swelling  Osteoporosis: no fractures    ALL OTHER SYSTEMS REVIEWED AND NEGATIVE    UNABLE TO OBTAIN    PHYSICAL EXAM:  VITALS: T(C): 36.4 (09-07-23 @ 02:40)  T(F): 97.5 (09-07-23 @ 02:40), Max: 98.6 (09-06-23 @ 15:20)  HR: 110 (09-07-23 @ 07:41) (11 - 110)  BP: 121/82 (09-07-23 @ 07:41) (121/82 - 174/92)  RR:  (15 - 20)  SpO2:  (100% - 100%)  Wt(kg): --  GENERAL: NAD, well-groomed, well-developed  EYES: No proptosis, no lid lag, anicteric  HEENT:  Atraumatic, Normocephalic, moist mucous membranes  THYROID: Normal size, no palpable nodules  RESPIRATORY: Clear to auscultation bilaterally; No rales, rhonchi, wheezing  CARDIOVASCULAR: Regular rate and rhythm; No murmurs; no peripheral edema  GI: Soft, nontender, non distended, normal bowel sounds  SKIN: Dry, intact, No rashes or lesions  MUSCULOSKELETAL: Full range of motion, normal strength  NEURO: sensation intact, extraocular movements intact, no tremor  PSYCH: Alert and oriented x 3, normal affect, normal mood  CUSHING'S SIGNS: no striae      CAPILLARY BLOOD GLUCOSE      POCT Blood Glucose.: 101 mg/dL (07 Sep 2023 12:14)  POCT Blood Glucose.: 130 mg/dL (07 Sep 2023 08:25)  POCT Blood Glucose.: 163 mg/dL (07 Sep 2023 01:25)  POCT Blood Glucose.: 310 mg/dL (06 Sep 2023 23:48)  POCT Blood Glucose.: 370 mg/dL (06 Sep 2023 22:52)  POCT Blood Glucose.: 475 mg/dL (06 Sep 2023 20:59)  POCT Blood Glucose.: >600 mg/dL (06 Sep 2023 15:24)                            7.3    8.20  )-----------( 256      ( 07 Sep 2023 09:32 )             20.6       09-07    138  |  95<L>  |  109<H>  ----------------------------<  168<H>  4.1   |  24  |  9.96<H>    eGFR: 4<L>    Ca    8.7      09-07  Mg     3.00     09-07  Phos  10.1     09-07    TPro  7.2  /  Alb  3.4  /  TBili  <0.2  /  DBili  x   /  AST  9   /  ALT  8   /  AlkPhos  43  09-07      Thyroid Function Tests:      A1C with Estimated Average Glucose Result: 10.4 % (09-06-23 @ 17:15)  A1C with Estimated Average Glucose Result: 10.6 % (01-27-23 @ 18:12)  A1C with Estimated Average Glucose Result: 10.6 % (01-27-23 @ 06:44)          Radiology:                HPI:  53 year-old [facility] female with PMHx of CKD (not on dialysis, +urine output), DM (insulin/lantus) HTN, and sarcoidosis who presented to ED from Nephrology outpatient office for elevated creatinine and initiation of dialysis. Scr >9, K 5.7. glucose 667 AG 26. Aside from mild nausea which had since passed at time of my exam, pt reports her USOH and baseline urine output. She did miss her insulin doses over last 2 days. Repeat labs follow lantus 50, lispo 4, 500cc IVF, shows BHB 0.0,  . CMP resulting. Pt lucid, without significant volume overload, normalized K, most recent pH 7.34. Seen by MICU who deemed no need for emergent dialysis.    Endocrine history:  53F with type 2 DM, advanced CKD told to present for new HD. Noted with glucose 600s and mild DKA on presentation. Anion gap 26 then downtrend to 19. Initial BHB 1.0 now 0 after insulin received overnight. pH 7.33.  Patient with DM2 for about 10 years, initially on pills now on insulin.  Follows with pcp, has future new visit scheduled with endocrinology Dr. Jenn Fagan 11/28.  Home regimen for DM is Basaglar 35 units qhs, skips frequently due to it causing hypoglycemia as low as 40s, might skip about 50% of the time.  Novolog 10 units premeals, has not taken in 1 week due to poor appetite, not eating.   Trulicity newer med for her, med rec reports 1.5mg SQ once weekly. Last dose 1.5 weeks ago. She denies side effects from this med.  Has a Freestyle Charlie 2 reader and sensors at home.  + painful DM neuropathy b/l, said she was given gabapentin which helps to some degree.  Daughter of patient at bedside and is upset about patient being in [facility] and not receiving a standard room.  Patient follows closely with outpatient nephrology.  Patient takes prednisone 10mg daily chronically for sarcoidosis.    PAST MEDICAL & SURGICAL HISTORY:  Sarcoidosis      Diabetes      Hypertension      Hyperlipidemia      Chronic kidney disease (CKD), stage III (moderate)      Seizure disorder      No significant past surgical history          FAMILY HISTORY:  Family history of diabetes mellitus in first degree relative        Social History: no tobacco    albuterol 90 mcg/inh inhalation aerosol: 2 puff(s) inhaled every 4 hours, As Needed  amLODIPine 10 mg oral tablet: 1 tab(s) orally once a day  Basaglar KwikPen 100 units/mL subcutaneous solution: 35 unit(s) subcutaneous once a day (at bedtime)  bumetanide 2 mg oral tablet: 1 tab(s) orally 3 times a day  famotidine 20 mg oral tablet: 1 tab(s) orally once a day (at bedtime)  ferrous sulfate 325 mg (65 mg elemental iron) oral delayed release tablet: 1 tab(s) orally once a day  gabapentin 100 mg oral capsule: 1 cap(s) orally 3 times a day  hydrALAZINE 100 mg oral tablet: 1 tab(s) orally 3 times a day  metOLazone 2.5 mg oral tablet: 1 tab(s) orally once a day  metOLazone 5 mg oral tablet: 1 tab(s) orally once a day  metoprolol succinate 25 mg oral tablet, extended release: 1 tab(s) orally once a day  NovoLOG 100 units/mL injectable solution: 10 unit(s) injectable 3 times a day   predniSONE 10 mg oral tablet: 1 tab(s) orally once a day  rosuvastatin 40 mg oral tablet: 1 tab(s) orally once a day  sevelamer carbonate 800 mg oral tablet: 2 tab(s) orally 3 times a day (with meals)  sodium bicarbonate 650 mg oral tablet: 1 tab(s) orally 3 times a day  Trulicity Pen 0.75 mg/0.5 mL subcutaneous solution: 0.75 milligram(s) subcutaneously once a week (Patient reports last dose ~1.5 weeks ago - unsure which day).      MEDICATIONS  (STANDING):  acetaminophen     Tablet .. 975 milliGRAM(s) Oral once  amLODIPine   Tablet 10 milliGRAM(s) Oral daily  atorvastatin 80 milliGRAM(s) Oral at bedtime  buMETAnide 2 milliGRAM(s) Oral two times a day  dextrose 5%. 1000 milliLiter(s) (100 mL/Hr) IV Continuous <Continuous>  dextrose 5%. 1000 milliLiter(s) (50 mL/Hr) IV Continuous <Continuous>  dextrose 50% Injectable 25 Gram(s) IV Push once  dextrose 50% Injectable 25 Gram(s) IV Push once  dextrose 50% Injectable 12.5 Gram(s) IV Push once  ferrous    sulfate 325 milliGRAM(s) Oral daily  gabapentin 200 milliGRAM(s) Oral daily  glucagon  Injectable 1 milliGRAM(s) IntraMuscular once  heparin   Injectable 5000 Unit(s) SubCutaneous every 8 hours  hydrALAZINE 100 milliGRAM(s) Oral three times a day  insulin glargine Injectable (LANTUS) 28 Unit(s) SubCutaneous at bedtime  insulin lispro Injectable (ADMELOG) 8 Unit(s) SubCutaneous before dinner  insulin lispro Injectable (ADMELOG) 8 Unit(s) SubCutaneous before lunch  insulin lispro Injectable (ADMELOG) 8 Unit(s) SubCutaneous before breakfast  metolazone 7.5 milliGRAM(s) Oral <User Schedule>  predniSONE   Tablet 10 milliGRAM(s) Oral daily  sevelamer carbonate 1600 milliGRAM(s) Oral three times a day with meals    MEDICATIONS  (PRN):  dextrose Oral Gel 15 Gram(s) Oral once PRN Blood Glucose LESS THAN 70 milliGRAM(s)/deciliter      Allergies    No Known Allergies    Intolerances      Review of Systems:  Constitutional: No fever  Eyes: No blurry vision  Neuro: + foot pain, numbness b/l  HEENT: No pain  Cardiovascular: No chest pain, palpitations  Respiratory: No SOB, no cough  GI: No nausea, vomiting, abdominal pain  : No dysuria  Skin: no rash  Psych: no depression  Endocrine: no polyuria, polydipsia  Hem/lymph: no swelling  Osteoporosis: no fractures    ALL OTHER SYSTEMS REVIEWED AND NEGATIVE      PHYSICAL EXAM:  VITALS: T(C): 36.4 (09-07-23 @ 02:40)  T(F): 97.5 (09-07-23 @ 02:40), Max: 98.6 (09-06-23 @ 15:20)  HR: 110 (09-07-23 @ 07:41) (11 - 110)  BP: 121/82 (09-07-23 @ 07:41) (121/82 - 174/92)  RR:  (15 - 20)  SpO2:  (100% - 100%)  Wt(kg): --  GENERAL: NAD, well-groomed, well-developed  EYES: No proptosis, no lid lag, anicteric  HEENT:  Atraumatic, Normocephalic, moist mucous membranes  THYROID: Normal size, no palpable nodules  RESPIRATORY: Clear to auscultation bilaterally; No rales, rhonchi, wheezing  CARDIOVASCULAR: Regular rate and rhythm; No murmurs; no peripheral edema  GI: Soft, nontender, non distended, normal bowel sounds  SKIN: Dry, intact, No rashes or lesions  MUSCULOSKELETAL: Full range of motion, normal strength  NEURO: decreased sesation b/l feet, no ulcers  PSYCH: Alert and oriented x 3, normal affect, normal mood      CAPILLARY BLOOD GLUCOSE      POCT Blood Glucose.: 101 mg/dL (07 Sep 2023 12:14)  POCT Blood Glucose.: 130 mg/dL (07 Sep 2023 08:25)  POCT Blood Glucose.: 163 mg/dL (07 Sep 2023 01:25)  POCT Blood Glucose.: 310 mg/dL (06 Sep 2023 23:48)  POCT Blood Glucose.: 370 mg/dL (06 Sep 2023 22:52)  POCT Blood Glucose.: 475 mg/dL (06 Sep 2023 20:59)  POCT Blood Glucose.: >600 mg/dL (06 Sep 2023 15:24)                            7.3    8.20  )-----------( 256      ( 07 Sep 2023 09:32 )             20.6       09-07    138  |  95<L>  |  109<H>  ----------------------------<  168<H>  4.1   |  24  |  9.96<H>    eGFR: 4<L>    Ca    8.7      09-07  Mg     3.00     09-07  Phos  10.1     09-07    TPro  7.2  /  Alb  3.4  /  TBili  <0.2  /  DBili  x   /  AST  9   /  ALT  8   /  AlkPhos  43  09-07      Thyroid Function Tests:      A1C with Estimated Average Glucose Result: 10.4 % (09-06-23 @ 17:15)  A1C with Estimated Average Glucose Result: 10.6 % (01-27-23 @ 18:12)  A1C with Estimated Average Glucose Result: 10.6 % (01-27-23 @ 06:44)          Radiology:

## 2023-09-07 NOTE — DISCHARGE NOTE PROVIDER - DID THE PATIENT PRESENT WITH OR WAS TREATED FOR MALNUTRITION DURING THIS ADMISSION
63 yo F with ESRD (on HD M/Tu/Th/Sa), type 1 DM w/DKA in past, CAD, HFrEF (EF 50% on TTE from 10/18), TIA, and PVD, initially admitted on 9/21/19 for COPD exacerbation 2/2 to enterovirus, PNA, now with hyperglycemia to 600s 2/2 steroids and increased po intake, MICU for concern for DKA.    #Hyperglycemia   Elevated to 600s today, likely 2/2 steroids and freq snaking by patient  AG 19, bicarb 19, pH 7.45, BHB negative  Pt at time of consult was refusing transfer to ICU for potential insulin drip   repeat     Recommend:  BMP, VBG, betahydroxybutyrate q4   FS q4 with insulin sliding scale, lantus/humalog as per endocrine recs   can d/w pulm regarding steroids as not optimal given hyperglycemia and pt not in acute respiratory distress    Not candidate for MICU at this time, please reconsult as necessary    D/w attending    Hoang Chavis, PGY-3  MICU resident 64y female with DM, CAD, ESRD, CHF, severe AS, COPD, PVD s/p b/l fem-pop, hilar adenopathy,  recently here with fever and viral URI.  Brief abx given, pt remained afebrile, resp status stable.  She returns with increasing SOB, weakness, chills, persistent cough.  Afebrile.  WBC slightly low with normal diff.  Trop still elevated.  Thus, difficult to be absolute abt prospect of new infection such as post viral pneumonia vs continuum of same process (PCR still with same EV/RV), now with component of CHF.  CT findings noted; again, ?of new infection/pneumonia or lag- CT not performed on recent admission.    Plan:  For any question of post viral pneumonia, will follow on Cefepime 1 g daily  Check Vanco level for AM  Doubt atypical process, d/c Azithro  Check MRSA screen  Follow temps and CBC/diff   d/w Dr Viera 65 yo F with ESRD (on HD M/Tu/Th/Sa), type 1 DM w/DKA in past, CAD, HFrEF (EF 50% on TTE from 10/18), TIA, and PVD, very recent admission here until two days ago for COPD exacerbation felt 2/2 viral illness from enterovirus now readmitted with HCAP No 65 yo F with ESRD on HD, DM1, CAD with ICM (EF 50%), TIA, and PVD (bilateral fem-pop bypass in 2013 with multiple angiogram/angioplasty since, and ileofemoral endarterectomy with vein patch 2015) well known to Dr. Elizalde admitted for COPD exacerbation secondary to pneumonia consulted for left lower extremity pain, which is chronic in nature, foot without wounds, DP/PT biphasic signal     - No intervention indicated, would recommend evaluate with arterial duplex (ordered)   - Discussed with Dr. Elizalde

## 2023-09-07 NOTE — H&P ADULT - NSHPREVIEWOFSYSTEMS_GEN_ALL_CORE
Review of Systems:   CONSTITUTIONAL: No fever  EYES: No eye pain, visual disturbances, or discharge  ENMT:  No difficulty hearing, tinnitus, vertigo; No sinus or throat pain  NECK: No pain or stiffness  RESPIRATORY: No cough, wheezing, chills or hemoptysis; No shortness of breath  CARDIOVASCULAR: No chest pain, palpitations, dizziness, or leg swelling  GASTROINTESTINAL: No abdominal or epigastric pain. No current  nausea, vomiting, or hematemesis; No diarrhea or constipation. No melena or hematochezia.  GENITOURINARY: No dysuria, frequency, hematuria, or incontinence  NEUROLOGICAL: baseline diabetic neuropathy   SKIN: No itching, burning, rashes, or lesions   MUSCULOSKELETAL: No joint pain or swelling; baseline diabetic neuropathy

## 2023-09-07 NOTE — DISCHARGE NOTE PROVIDER - NSDCMRMEDTOKEN_GEN_ALL_CORE_FT
albuterol 90 mcg/inh inhalation aerosol: 2 puff(s) inhaled every 4 hours, As Needed  amLODIPine 10 mg oral tablet: 1 tab(s) orally once a day  Basaglar KwikPen 100 units/mL subcutaneous solution: 35 unit(s) subcutaneous once a day (at bedtime)  bumetanide 2 mg oral tablet: 1 tab(s) orally 3 times a day  famotidine 20 mg oral tablet: 1 tab(s) orally once a day (at bedtime)  ferrous sulfate 325 mg (65 mg elemental iron) oral delayed release tablet: 1 tab(s) orally once a day  gabapentin 100 mg oral capsule: 1 cap(s) orally 3 times a day  hydrALAZINE 100 mg oral tablet: 1 tab(s) orally 3 times a day  metOLazone 2.5 mg oral tablet: 1 tab(s) orally once a day  metOLazone 5 mg oral tablet: 1 tab(s) orally once a day  metoprolol succinate 25 mg oral tablet, extended release: 1 tab(s) orally once a day  NovoLOG 100 units/mL injectable solution: 10 unit(s) injectable 3 times a day   predniSONE 10 mg oral tablet: 1 tab(s) orally once a day  rosuvastatin 40 mg oral tablet: 1 tab(s) orally once a day  sevelamer carbonate 800 mg oral tablet: 2 tab(s) orally 3 times a day (with meals)  sodium bicarbonate 650 mg oral tablet: 1 tab(s) orally 3 times a day  Trulicity Pen 0.75 mg/0.5 mL subcutaneous solution: 0.75 milligram(s) subcutaneously once a week (Patient reports last dose ~1.5 weeks ago - unsure which day).

## 2023-09-07 NOTE — DISCHARGE NOTE PROVIDER - NSDCFUSCHEDAPPT_GEN_ALL_CORE_FT
[facility]  [address]  [address]   Scheduled Appointment: 09/11/2023    [facility]  [address]  [address]  Scheduled Appointment: 09/13/2023    [facility]  PULED [address]  Scheduled Appointment: 09/25/2023    Addi Montiel  [facility]  PULMMED [address]  Scheduled Appointment: 09/25/2023    Jose M Garibay  [facility]  [id number]  Scheduled Appointment: 11/08/2023    Rosie Fagan  [facility]  [address]CRIN [address]  Scheduled Appointment: 11/28/2023

## 2023-09-07 NOTE — DISCHARGE NOTE NURSING/CASE MANAGEMENT/SOCIAL WORK - PATIENT PORTAL LINK FT
You can access the FollowMyHealth Patient Portal offered by [facility] by registering at the following website: [email or web address]. By joining [facility]’s FollowMyHealth portal, you will also be able to view your health information using other applications (apps) compatible with our system.

## 2023-09-07 NOTE — CONSULT NOTE ADULT - CONSULT REASON
MICU evaluation for urgent dialysis complicated by DKA rule out
Non-tunneled catheter placement for HD
DM2 uncontrolled

## 2023-09-08 ENCOUNTER — NON-APPOINTMENT (OUTPATIENT)
Age: 53
End: 2023-09-08

## 2023-09-11 ENCOUNTER — APPOINTMENT (OUTPATIENT)
Dept: INTERNAL MEDICINE | Facility: CLINIC | Age: 53
End: 2023-09-11

## 2023-09-12 LAB — PTH RELATED PROT SERPL-MCNC: <2 PMOL/L — SIGNIFICANT CHANGE UP

## 2023-09-13 ENCOUNTER — APPOINTMENT (OUTPATIENT)
Age: 53
End: 2023-09-13

## 2023-09-19 ENCOUNTER — INPATIENT (INPATIENT)
Facility: HOSPITAL | Age: 53
LOS: 8 days | Discharge: HOME | DRG: 673 | End: 2023-09-28
Attending: HOSPITALIST | Admitting: HOSPITALIST
Payer: MEDICARE

## 2023-09-19 VITALS
OXYGEN SATURATION: 95 % | DIASTOLIC BLOOD PRESSURE: 76 MMHG | WEIGHT: 149.91 LBS | RESPIRATION RATE: 18 BRPM | HEART RATE: 102 BPM | TEMPERATURE: 103 F | SYSTOLIC BLOOD PRESSURE: 174 MMHG

## 2023-09-19 LAB
ALBUMIN SERPL ELPH-MCNC: 3.6 G/DL — SIGNIFICANT CHANGE UP (ref 3.3–5)
ALP SERPL-CCNC: 35 U/L — LOW (ref 40–120)
ALT FLD-CCNC: 12 U/L — SIGNIFICANT CHANGE UP (ref 10–45)
ANION GAP SERPL CALC-SCNC: 27 MMOL/L — HIGH (ref 5–17)
AST SERPL-CCNC: 13 U/L — SIGNIFICANT CHANGE UP (ref 10–40)
BASE EXCESS BLDV CALC-SCNC: 0.6 MMOL/L — SIGNIFICANT CHANGE UP (ref -2–3)
BASOPHILS # BLD AUTO: 0.12 K/UL — SIGNIFICANT CHANGE UP (ref 0–0.2)
BASOPHILS NFR BLD AUTO: 0.8 % — SIGNIFICANT CHANGE UP (ref 0–2)
BILIRUB SERPL-MCNC: 0.4 MG/DL — SIGNIFICANT CHANGE UP (ref 0.2–1.2)
BUN SERPL-MCNC: 144 MG/DL — HIGH (ref 7–23)
CA-I SERPL-SCNC: 0.84 MMOL/L — LOW (ref 1.15–1.33)
CALCIUM SERPL-MCNC: 7.4 MG/DL — LOW (ref 8.4–10.5)
CHLORIDE BLDV-SCNC: 97 MMOL/L — SIGNIFICANT CHANGE UP (ref 96–108)
CHLORIDE SERPL-SCNC: 91 MMOL/L — LOW (ref 96–108)
CO2 BLDV-SCNC: 27 MMOL/L — HIGH (ref 22–26)
CO2 SERPL-SCNC: 22 MMOL/L — SIGNIFICANT CHANGE UP (ref 22–31)
CREAT SERPL-MCNC: 10.92 MG/DL — HIGH (ref 0.5–1.3)
EGFR: 4 ML/MIN/1.73M2 — LOW
EOSINOPHIL # BLD AUTO: 0.52 K/UL — HIGH (ref 0–0.5)
EOSINOPHIL NFR BLD AUTO: 3.5 % — SIGNIFICANT CHANGE UP (ref 0–6)
GAS PNL BLDV: 136 MMOL/L — SIGNIFICANT CHANGE UP (ref 136–145)
GAS PNL BLDV: SIGNIFICANT CHANGE UP
GLUCOSE BLDV-MCNC: 126 MG/DL — HIGH (ref 70–99)
GLUCOSE SERPL-MCNC: 131 MG/DL — HIGH (ref 70–99)
HCO3 BLDV-SCNC: 26 MMOL/L — SIGNIFICANT CHANGE UP (ref 22–29)
HCT VFR BLD CALC: 20.2 % — CRITICAL LOW (ref 34.5–45)
HCT VFR BLDA CALC: 20 % — CRITICAL LOW (ref 34.5–46.5)
HGB BLD CALC-MCNC: 6.8 G/DL — CRITICAL LOW (ref 11.7–16.1)
HGB BLD-MCNC: 6.7 G/DL — CRITICAL LOW (ref 11.5–15.5)
HYPOCHROMIA BLD QL: SLIGHT — SIGNIFICANT CHANGE UP
LACTATE BLDV-MCNC: 1.1 MMOL/L — SIGNIFICANT CHANGE UP (ref 0.5–2)
LYMPHOCYTES # BLD AUTO: 16.5 % — SIGNIFICANT CHANGE UP (ref 13–44)
LYMPHOCYTES # BLD AUTO: 2.44 K/UL — SIGNIFICANT CHANGE UP (ref 1–3.3)
MAGNESIUM SERPL-MCNC: 3.1 MG/DL — HIGH (ref 1.6–2.6)
MANUAL SMEAR VERIFICATION: SIGNIFICANT CHANGE UP
MCHC RBC-ENTMCNC: 29 PG — SIGNIFICANT CHANGE UP (ref 27–34)
MCHC RBC-ENTMCNC: 33.2 GM/DL — SIGNIFICANT CHANGE UP (ref 32–36)
MCV RBC AUTO: 87.4 FL — SIGNIFICANT CHANGE UP (ref 80–100)
MONOCYTES # BLD AUTO: 0.9 K/UL — SIGNIFICANT CHANGE UP (ref 0–0.9)
MONOCYTES NFR BLD AUTO: 6.1 % — SIGNIFICANT CHANGE UP (ref 2–14)
MYELOCYTES NFR BLD: 0.9 % — HIGH (ref 0–0)
NEUTROPHILS # BLD AUTO: 10.67 K/UL — HIGH (ref 1.8–7.4)
NEUTROPHILS NFR BLD AUTO: 72.2 % — SIGNIFICANT CHANGE UP (ref 43–77)
OTHER CELLS CSF MANUAL: 4 ML/DL — LOW (ref 18–22)
PCO2 BLDV: 45 MMHG — HIGH (ref 39–42)
PH BLDV: 7.37 — SIGNIFICANT CHANGE UP (ref 7.32–7.43)
PHOSPHATE SERPL-MCNC: 8.9 MG/DL — HIGH (ref 2.5–4.5)
PLAT MORPH BLD: NORMAL — SIGNIFICANT CHANGE UP
PLATELET # BLD AUTO: 184 K/UL — SIGNIFICANT CHANGE UP (ref 150–400)
PO2 BLDV: 25 MMHG — SIGNIFICANT CHANGE UP (ref 25–45)
POTASSIUM BLDV-SCNC: 4.9 MMOL/L — SIGNIFICANT CHANGE UP (ref 3.5–5.1)
POTASSIUM SERPL-MCNC: 5 MMOL/L — SIGNIFICANT CHANGE UP (ref 3.5–5.3)
POTASSIUM SERPL-SCNC: 5 MMOL/L — SIGNIFICANT CHANGE UP (ref 3.5–5.3)
PROT SERPL-MCNC: 6.9 G/DL — SIGNIFICANT CHANGE UP (ref 6–8.3)
RBC # BLD: 2.31 M/UL — LOW (ref 3.8–5.2)
RBC # FLD: 12.3 % — SIGNIFICANT CHANGE UP (ref 10.3–14.5)
RBC BLD AUTO: SIGNIFICANT CHANGE UP
SAO2 % BLDV: 39.5 % — LOW (ref 67–88)
SODIUM SERPL-SCNC: 140 MMOL/L — SIGNIFICANT CHANGE UP (ref 135–145)
WBC # BLD: 14.78 K/UL — HIGH (ref 3.8–10.5)
WBC # FLD AUTO: 14.78 K/UL — HIGH (ref 3.8–10.5)

## 2023-09-19 PROCEDURE — 99285 EMERGENCY DEPT VISIT HI MDM: CPT

## 2023-09-19 RX ORDER — VANCOMYCIN HCL 1 G
1000 VIAL (EA) INTRAVENOUS ONCE
Refills: 0 | Status: COMPLETED | OUTPATIENT
Start: 2023-09-19 | End: 2023-09-19

## 2023-09-19 RX ORDER — SODIUM CHLORIDE 9 MG/ML
1000 INJECTION, SOLUTION INTRAVENOUS ONCE
Refills: 0 | Status: COMPLETED | OUTPATIENT
Start: 2023-09-19 | End: 2023-09-19

## 2023-09-19 RX ORDER — CEFEPIME 1 G/1
1000 INJECTION, POWDER, FOR SOLUTION INTRAMUSCULAR; INTRAVENOUS ONCE
Refills: 0 | Status: COMPLETED | OUTPATIENT
Start: 2023-09-19 | End: 2023-09-19

## 2023-09-19 RX ORDER — ACETAMINOPHEN 500 MG
1000 TABLET ORAL ONCE
Refills: 0 | Status: COMPLETED | OUTPATIENT
Start: 2023-09-19 | End: 2023-09-19

## 2023-09-19 RX ADMIN — Medication 250 MILLIGRAM(S): at 23:53

## 2023-09-19 RX ADMIN — CEFEPIME 100 MILLIGRAM(S): 1 INJECTION, POWDER, FOR SOLUTION INTRAMUSCULAR; INTRAVENOUS at 23:44

## 2023-09-19 RX ADMIN — Medication 400 MILLIGRAM(S): at 22:36

## 2023-09-19 RX ADMIN — Medication 1000 MILLIGRAM(S): at 22:51

## 2023-09-19 RX ADMIN — SODIUM CHLORIDE 1000 MILLILITER(S): 9 INJECTION, SOLUTION INTRAVENOUS at 23:53

## 2023-09-19 NOTE — ED PROVIDER NOTE - OBJECTIVE STATEMENT
52 yo F with PMHx of ESRD not on HD, poorly controlled IDDM (on insulin), HTN, sarcoidosis, recent admission for HD (9/6-7) but never received due to leaving AMA, presenting for worsening chills since Friday. Pt also complained of 10/10 back pain which started today while in bed. Daughter stated that today, pt was sleeping all day in bed and much more tired than usual. Pt endorsed nonproductive cough. No sick contacts, no recent travel. Lives w/ children at home. Denied SOB or leg swelling. Daughter denied any change to pt's mental status.    Nephrologist: Dr. Jose M Garibay

## 2023-09-19 NOTE — ED ADULT NURSE NOTE - NSFALLRISKINTERV_ED_ALL_ED

## 2023-09-19 NOTE — ED PROVIDER NOTE - ATTENDING APP SHARED VISIT CONTRIBUTION OF CARE
Attending MD HITESH Soria- This was a shared visit with GIOVANNY.  I have reviewed and discussed the case with the GIOVANNY and agree with verified documentation unless otherwise documented.  I have independently spoken with and examined the patient and my documentation of history/physical exam and MDM are as follows:    53 F with PMH ESRD not on HD, HTN, poorly controlled DM, sarcoidosis, recent admission to initiate HD but patient left AMA, presenting with chills and generalized weakness x3 to 4 days with dry cough.  No fever, chest pain, shortness of breath, abdominal pain, GI symptoms, dysuria.  No sick contacts or recent travel.  On exam, patient in no acute distress.  Heart and lungs clear to auscultation, abdomen soft nontender.    MDM–53 F with multiple comorbidities presenting with chills and generalized weakness, noted to be febrile and tachycardic on triage vitals, concern for sepsis.  Will evaluate for metabolic derangement and infection with labs, chest x-ray.  Treated with empiric antibiotics, fluids, Tylenol.    Labs with leukocytosis to 14.7.  Anemic to 6.7, will transfuse (patient has required transfusions in the past).  No thrombocytopenia.  BMP concerning for creatinine of 10.9, gradually uptrending compared to prior recent values.    Anion gap attributable to .  Hepatic panel grossly within normal limits.  No significant acid-base derangement, lactate negative.  Viral panel positive for rhino enterovirus.  X-ray chest with bibasilar hazy opacities.  Urine negative for infection.  Patient admitted for sepsis secondary to gram-negative enterovirus.

## 2023-09-19 NOTE — ED ADULT NURSE NOTE - OBJECTIVE STATEMENT
53 y.o. female coming in from home via private car for chills x 3 days. pt states that she was recently diagnosed with CKD and was told that she needs to go on dialysis but was nervous about having a HD catheter placed, states that she was told that someone at [facility] would be able to sedate her for the procedure and went to [facility] to get the HD catheter placed. pt states that staff was delaying the procedure and pt wanted to leave the hospital, pt then 3 days ago stated that she started shaking and today started to feel feverish but did not take her temperature at home. PMH HTN, diabetes, HLD. A&Ox3, vss, NSR on tele, no other complaints at this time, bed in lowest position, call bell in reach and teaching on use completed, daughter at bedside.

## 2023-09-19 NOTE — ED PROVIDER NOTE - PHYSICAL EXAMINATION
PHYSICAL EXAM:  GENERAL: NAD, well-groomed, well-developed  HEAD:  Atraumatic, Normocephalic  EYES: EOMI, PERRLA, conjunctiva and sclera clear, no jaundice   ENMT: No tonsillar erythema, exudates, or enlargement; Moist mucous membranes  HEART: Regular rate and rhythm; No murmurs, rubs, or gallops. S1/S2 present  RESPIRATORY: CTA B/L, No W/R/R  ABDOMEN: Soft, Nontender, Nondistended; Bowel sounds present. No hepatosplenomegaly  NEUROLOGY: A&Ox3, nonfocal, moving all extremities  EXTREMITIES:  2+ Peripheral Pulses, No clubbing, cyanosis, or edema  SKIN: warm, dry, normal color, no rash or abnormal lesions

## 2023-09-19 NOTE — ED PROVIDER NOTE - CLINICAL SUMMARY MEDICAL DECISION MAKING FREE TEXT BOX
54 yo F with PMHx of ESRD not on HD, poorly controlled IDDM (on insulin), HTN, sarcoidosis, recent admission for HD (9/6-7) but never received due to leaving AMA, presenting for worsening chills since Friday, will obtain labs, RVP, CXR given desat.

## 2023-09-19 NOTE — ED ADULT TRIAGE NOTE - CHIEF COMPLAINT QUOTE
52 yo F ESRD to be started on HD pt has been avoiding starting now c/o chills, fever and slight cough.

## 2023-09-20 DIAGNOSIS — E83.39 OTHER DISORDERS OF PHOSPHORUS METABOLISM: ICD-10-CM

## 2023-09-20 DIAGNOSIS — Z86.69 PERSONAL HISTORY OF OTHER DISEASES OF THE NERVOUS SYSTEM AND SENSE ORGANS: ICD-10-CM

## 2023-09-20 DIAGNOSIS — A41.9 SEPSIS, UNSPECIFIED ORGANISM: ICD-10-CM

## 2023-09-20 DIAGNOSIS — Z29.9 ENCOUNTER FOR PROPHYLACTIC MEASURES, UNSPECIFIED: ICD-10-CM

## 2023-09-20 DIAGNOSIS — E11.9 TYPE 2 DIABETES MELLITUS WITHOUT COMPLICATIONS: ICD-10-CM

## 2023-09-20 DIAGNOSIS — E83.51 HYPOCALCEMIA: ICD-10-CM

## 2023-09-20 DIAGNOSIS — D86.9 SARCOIDOSIS, UNSPECIFIED: ICD-10-CM

## 2023-09-20 DIAGNOSIS — D64.9 ANEMIA, UNSPECIFIED: ICD-10-CM

## 2023-09-20 DIAGNOSIS — N18.6 END STAGE RENAL DISEASE: ICD-10-CM

## 2023-09-20 DIAGNOSIS — E78.5 HYPERLIPIDEMIA, UNSPECIFIED: ICD-10-CM

## 2023-09-20 DIAGNOSIS — G40.909 EPILEPSY, UNSPECIFIED, NOT INTRACTABLE, WITHOUT STATUS EPILEPTICUS: ICD-10-CM

## 2023-09-20 DIAGNOSIS — I10 ESSENTIAL (PRIMARY) HYPERTENSION: ICD-10-CM

## 2023-09-20 DIAGNOSIS — B34.9 VIRAL INFECTION, UNSPECIFIED: ICD-10-CM

## 2023-09-20 DIAGNOSIS — M54.9 DORSALGIA, UNSPECIFIED: ICD-10-CM

## 2023-09-20 LAB
ANION GAP SERPL CALC-SCNC: 23 MMOL/L — HIGH (ref 5–17)
APPEARANCE UR: CLEAR — SIGNIFICANT CHANGE UP
BACTERIA # UR AUTO: NEGATIVE — SIGNIFICANT CHANGE UP
BILIRUB UR-MCNC: NEGATIVE — SIGNIFICANT CHANGE UP
BLD GP AB SCN SERPL QL: NEGATIVE — SIGNIFICANT CHANGE UP
BUN SERPL-MCNC: 128 MG/DL — HIGH (ref 7–23)
CALCIUM SERPL-MCNC: 6.7 MG/DL — LOW (ref 8.4–10.5)
CHLORIDE SERPL-SCNC: 95 MMOL/L — LOW (ref 96–108)
CO2 SERPL-SCNC: 20 MMOL/L — LOW (ref 22–31)
COLOR SPEC: YELLOW — SIGNIFICANT CHANGE UP
CREAT SERPL-MCNC: 10.33 MG/DL — HIGH (ref 0.5–1.3)
DIFF PNL FLD: ABNORMAL
EGFR: 4 ML/MIN/1.73M2 — LOW
EPI CELLS # UR: 2 — SIGNIFICANT CHANGE UP
GLUCOSE BLDC GLUCOMTR-MCNC: 167 MG/DL — HIGH (ref 70–99)
GLUCOSE BLDC GLUCOMTR-MCNC: 180 MG/DL — HIGH (ref 70–99)
GLUCOSE SERPL-MCNC: 118 MG/DL — HIGH (ref 70–99)
GLUCOSE UR QL: ABNORMAL
HCT VFR BLD CALC: 22.5 % — LOW (ref 34.5–45)
HGB BLD-MCNC: 7.6 G/DL — LOW (ref 11.5–15.5)
HYALINE CASTS # UR AUTO: 7 /LPF — HIGH (ref 0–7)
KETONES UR-MCNC: NEGATIVE — SIGNIFICANT CHANGE UP
LEUKOCYTE ESTERASE UR-ACNC: NEGATIVE — SIGNIFICANT CHANGE UP
MCHC RBC-ENTMCNC: 29.5 PG — SIGNIFICANT CHANGE UP (ref 27–34)
MCHC RBC-ENTMCNC: 33.8 GM/DL — SIGNIFICANT CHANGE UP (ref 32–36)
MCV RBC AUTO: 87.2 FL — SIGNIFICANT CHANGE UP (ref 80–100)
NITRITE UR-MCNC: NEGATIVE — SIGNIFICANT CHANGE UP
NRBC # BLD: 0 /100 WBCS — SIGNIFICANT CHANGE UP (ref 0–0)
PH UR: 7 — SIGNIFICANT CHANGE UP (ref 5–8)
PLATELET # BLD AUTO: 158 K/UL — SIGNIFICANT CHANGE UP (ref 150–400)
POTASSIUM SERPL-MCNC: 4.6 MMOL/L — SIGNIFICANT CHANGE UP (ref 3.5–5.3)
POTASSIUM SERPL-SCNC: 4.6 MMOL/L — SIGNIFICANT CHANGE UP (ref 3.5–5.3)
PROT UR-MCNC: ABNORMAL
RAPID RVP RESULT: DETECTED
RBC # BLD: 2.58 M/UL — LOW (ref 3.8–5.2)
RBC # FLD: 12.3 % — SIGNIFICANT CHANGE UP (ref 10.3–14.5)
RBC CASTS # UR COMP ASSIST: 2 /HPF — SIGNIFICANT CHANGE UP (ref 0–4)
RH IG SCN BLD-IMP: NEGATIVE — SIGNIFICANT CHANGE UP
RV+EV RNA SPEC QL NAA+PROBE: DETECTED
SARS-COV-2 RNA SPEC QL NAA+PROBE: SIGNIFICANT CHANGE UP
SODIUM SERPL-SCNC: 138 MMOL/L — SIGNIFICANT CHANGE UP (ref 135–145)
SP GR SPEC: 1.02 — SIGNIFICANT CHANGE UP (ref 1.01–1.02)
UROBILINOGEN FLD QL: SIGNIFICANT CHANGE UP
WBC # BLD: 12.88 K/UL — HIGH (ref 3.8–10.5)
WBC # FLD AUTO: 12.88 K/UL — HIGH (ref 3.8–10.5)
WBC UR QL: 1 /HPF — SIGNIFICANT CHANGE UP (ref 0–5)

## 2023-09-20 PROCEDURE — 99255 IP/OBS CONSLTJ NEW/EST HI 80: CPT | Mod: GC

## 2023-09-20 PROCEDURE — 99223 1ST HOSP IP/OBS HIGH 75: CPT | Mod: GC

## 2023-09-20 PROCEDURE — 51702 INSERT TEMP BLADDER CATH: CPT

## 2023-09-20 PROCEDURE — 71045 X-RAY EXAM CHEST 1 VIEW: CPT | Mod: 26

## 2023-09-20 PROCEDURE — 72100 X-RAY EXAM L-S SPINE 2/3 VWS: CPT | Mod: 26

## 2023-09-20 RX ORDER — FAMOTIDINE 10 MG/ML
20 INJECTION INTRAVENOUS DAILY
Refills: 0 | Status: DISCONTINUED | OUTPATIENT
Start: 2023-09-20 | End: 2023-09-28

## 2023-09-20 RX ORDER — AMLODIPINE BESYLATE 2.5 MG/1
10 TABLET ORAL DAILY
Refills: 0 | Status: DISCONTINUED | OUTPATIENT
Start: 2023-09-20 | End: 2023-09-28

## 2023-09-20 RX ORDER — AMLODIPINE BESYLATE 2.5 MG/1
10 TABLET ORAL ONCE
Refills: 0 | Status: COMPLETED | OUTPATIENT
Start: 2023-09-20 | End: 2023-09-20

## 2023-09-20 RX ORDER — DEXTROSE 50 % IN WATER 50 %
15 SYRINGE (ML) INTRAVENOUS ONCE
Refills: 0 | Status: DISCONTINUED | OUTPATIENT
Start: 2023-09-20 | End: 2023-09-28

## 2023-09-20 RX ORDER — DEXTROSE 50 % IN WATER 50 %
25 SYRINGE (ML) INTRAVENOUS ONCE
Refills: 0 | Status: DISCONTINUED | OUTPATIENT
Start: 2023-09-20 | End: 2023-09-28

## 2023-09-20 RX ORDER — SODIUM CHLORIDE 9 MG/ML
1000 INJECTION, SOLUTION INTRAVENOUS
Refills: 0 | Status: DISCONTINUED | OUTPATIENT
Start: 2023-09-20 | End: 2023-09-28

## 2023-09-20 RX ORDER — INSULIN GLARGINE 100 [IU]/ML
14 INJECTION, SOLUTION SUBCUTANEOUS AT BEDTIME
Refills: 0 | Status: DISCONTINUED | OUTPATIENT
Start: 2023-09-20 | End: 2023-09-24

## 2023-09-20 RX ORDER — METOPROLOL TARTRATE 50 MG
25 TABLET ORAL DAILY
Refills: 0 | Status: DISCONTINUED | OUTPATIENT
Start: 2023-09-20 | End: 2023-09-28

## 2023-09-20 RX ORDER — ATORVASTATIN CALCIUM 80 MG/1
80 TABLET, FILM COATED ORAL AT BEDTIME
Refills: 0 | Status: DISCONTINUED | OUTPATIENT
Start: 2023-09-20 | End: 2023-09-28

## 2023-09-20 RX ORDER — DEXTROSE 50 % IN WATER 50 %
12.5 SYRINGE (ML) INTRAVENOUS ONCE
Refills: 0 | Status: DISCONTINUED | OUTPATIENT
Start: 2023-09-20 | End: 2023-09-28

## 2023-09-20 RX ORDER — HYDRALAZINE HCL 50 MG
100 TABLET ORAL ONCE
Refills: 0 | Status: COMPLETED | OUTPATIENT
Start: 2023-09-20 | End: 2023-09-20

## 2023-09-20 RX ORDER — CALCIUM ACETATE 667 MG
667 TABLET ORAL
Refills: 0 | Status: DISCONTINUED | OUTPATIENT
Start: 2023-09-20 | End: 2023-09-28

## 2023-09-20 RX ORDER — METOPROLOL TARTRATE 50 MG
25 TABLET ORAL ONCE
Refills: 0 | Status: COMPLETED | OUTPATIENT
Start: 2023-09-20 | End: 2023-09-20

## 2023-09-20 RX ORDER — ACETAMINOPHEN 500 MG
650 TABLET ORAL ONCE
Refills: 0 | Status: COMPLETED | OUTPATIENT
Start: 2023-09-20 | End: 2023-09-20

## 2023-09-20 RX ORDER — INSULIN LISPRO 100/ML
VIAL (ML) SUBCUTANEOUS EVERY 6 HOURS
Refills: 0 | Status: DISCONTINUED | OUTPATIENT
Start: 2023-09-20 | End: 2023-09-22

## 2023-09-20 RX ORDER — INFLUENZA VIRUS VACCINE 15; 15; 15; 15 UG/.5ML; UG/.5ML; UG/.5ML; UG/.5ML
0.5 SUSPENSION INTRAMUSCULAR ONCE
Refills: 0 | Status: DISCONTINUED | OUTPATIENT
Start: 2023-09-20 | End: 2023-09-28

## 2023-09-20 RX ORDER — GABAPENTIN 400 MG/1
100 CAPSULE ORAL THREE TIMES A DAY
Refills: 0 | Status: DISCONTINUED | OUTPATIENT
Start: 2023-09-20 | End: 2023-09-24

## 2023-09-20 RX ORDER — HYDRALAZINE HCL 50 MG
100 TABLET ORAL THREE TIMES A DAY
Refills: 0 | Status: DISCONTINUED | OUTPATIENT
Start: 2023-09-20 | End: 2023-09-28

## 2023-09-20 RX ORDER — HEPARIN SODIUM 5000 [USP'U]/ML
5000 INJECTION INTRAVENOUS; SUBCUTANEOUS EVERY 8 HOURS
Refills: 0 | Status: DISCONTINUED | OUTPATIENT
Start: 2023-09-20 | End: 2023-09-20

## 2023-09-20 RX ORDER — SODIUM BICARBONATE 1 MEQ/ML
650 SYRINGE (ML) INTRAVENOUS THREE TIMES A DAY
Refills: 0 | Status: DISCONTINUED | OUTPATIENT
Start: 2023-09-20 | End: 2023-09-22

## 2023-09-20 RX ORDER — CEFEPIME 1 G/1
500 INJECTION, POWDER, FOR SOLUTION INTRAMUSCULAR; INTRAVENOUS EVERY 24 HOURS
Refills: 0 | Status: DISCONTINUED | OUTPATIENT
Start: 2023-09-21 | End: 2023-09-22

## 2023-09-20 RX ORDER — GLUCAGON INJECTION, SOLUTION 0.5 MG/.1ML
1 INJECTION, SOLUTION SUBCUTANEOUS ONCE
Refills: 0 | Status: DISCONTINUED | OUTPATIENT
Start: 2023-09-20 | End: 2023-09-28

## 2023-09-20 RX ADMIN — Medication 1000 MILLIGRAM(S): at 01:18

## 2023-09-20 RX ADMIN — Medication 25 MILLIGRAM(S): at 04:53

## 2023-09-20 RX ADMIN — ATORVASTATIN CALCIUM 80 MILLIGRAM(S): 80 TABLET, FILM COATED ORAL at 21:32

## 2023-09-20 RX ADMIN — Medication 650 MILLIGRAM(S): at 21:33

## 2023-09-20 RX ADMIN — CEFEPIME 1000 MILLIGRAM(S): 1 INJECTION, POWDER, FOR SOLUTION INTRAMUSCULAR; INTRAVENOUS at 01:18

## 2023-09-20 RX ADMIN — SODIUM CHLORIDE 1000 MILLILITER(S): 9 INJECTION, SOLUTION INTRAVENOUS at 01:18

## 2023-09-20 RX ADMIN — Medication 100 MILLIGRAM(S): at 21:32

## 2023-09-20 RX ADMIN — Medication 1: at 18:06

## 2023-09-20 RX ADMIN — AMLODIPINE BESYLATE 10 MILLIGRAM(S): 2.5 TABLET ORAL at 04:53

## 2023-09-20 RX ADMIN — INSULIN GLARGINE 14 UNIT(S): 100 INJECTION, SOLUTION SUBCUTANEOUS at 21:32

## 2023-09-20 RX ADMIN — Medication 650 MILLIGRAM(S): at 17:16

## 2023-09-20 RX ADMIN — Medication 650 MILLIGRAM(S): at 04:52

## 2023-09-20 RX ADMIN — GABAPENTIN 100 MILLIGRAM(S): 400 CAPSULE ORAL at 21:32

## 2023-09-20 RX ADMIN — Medication 1: at 21:33

## 2023-09-20 RX ADMIN — Medication 667 MILLIGRAM(S): at 17:17

## 2023-09-20 RX ADMIN — Medication 100 MILLIGRAM(S): at 04:53

## 2023-09-20 NOTE — MEDICAL STUDENT ADULT H&P (EDUCATION) - PLAN 2
- Sepsis workup with blood culture and urine culture pending  - CXR complete showing bilateral hazy opacities  - Patient received one time dose of cefepime and vancomycin

## 2023-09-20 NOTE — MEDICAL STUDENT ADULT H&P (EDUCATION) - NS MD HP STUD PMH FT
Sarcoidosis- on prednisone and albuterol  Poorly Controlled DM- on insulin and Lantus  CKD3  HTN- on amlodipine, metoprolol, hydralazine  HLD- on rosuvastatin  BASIM- on ferrous sulfate  GERD- on famotidine  Seizure Disorder- started 5 years ago with 4 seizures, took Phenytoin and Vimpat up until November 2021 and has not followed with neurologist, but has not had any seizures as per daughter)

## 2023-09-20 NOTE — MEDICAL STUDENT ADULT H&P (EDUCATION) - NSHPREVIEWOFSYSTEMS_GEN_ALL_CORE
Review of Systems:  CONSTITUTIONAL: Febrile, somnolent  EYES: No eye pain, visual disturbances, or discharge  ENMT:  No difficulty hearing, tinnitus, vertigo; No sinus or throat pain  NECK: No pain or stiffness  RESPIRATORY: No cough, wheezing, chills or hemoptysis; No shortness of breath  CARDIOVASCULAR: No chest pain, palpitations, dizziness, + B/L 1+ pitting edema  GASTROINTESTINAL: No abdominal or epigastric pain. No current  nausea, vomiting, or hematemesis; No diarrhea or constipation. No melena or hematochezia.  GENITOURINARY: No dysuria, frequency, hematuria, or incontinence  NEUROLOGICAL: baseline diabetic neuropathy   SKIN: No itching, burning, rashes, or lesions   MUSCULOSKELETAL: Pain on palpation around L2-L4; baseline diabetic neuropathy

## 2023-09-20 NOTE — ED ADULT NURSE REASSESSMENT NOTE - NS ED NURSE REASSESS COMMENT FT1
rai catheter attempted x 3 with 16 F and 14 F coudae catheter, inpatient MD Singh Ferrara contacted and states that he will put in for a urology consult to attempt for rai, pending floor bed

## 2023-09-20 NOTE — H&P ADULT - NSHPPOAPULMEMBOLUS_GEN_A_CORE
Left HIPAA compliant message for patient to return call to [phone]. Re: Follow up: ACM attempted to reach pt to offer CC enrollment , to assess needs. Message left with number to return call.  Referral sent from [facility].  Review POC no

## 2023-09-20 NOTE — H&P ADULT - ATTENDING COMMENTS
-Found to have enterorhinovirus positive but unclear if that completely explains high fever to 102. Would cover with cefepime for now pending blood cultures. MRSA swab. Consider further imaging. Supportive care.   -ESRD. Renal for HD initiation. IR appreciated; plan for non-tunneled HD cath tomorrow with sedation. NPO at MN. Given retention of urine, urology appreciated for [place] placement.   -DM mgmt. Adjust insulins based on PO status and BG levels and SS usage.   -Trend Hgb. S/p 1 PRBC. Anemia w/u.   -F/u spine X-ray. Inflammatory markers.

## 2023-09-20 NOTE — CONSULT NOTE ADULT - SUBJECTIVE AND OBJECTIVE BOX
Interventional Radiology    Evaluate for Procedure: HD cathter placement     HPI: HPI:  Patient is a 54 y/o F w/ a PMHx of ESRD not on HD, poorly controlled Type 2 DM on insulin, HTN, and sarcoidosis, with a recent admission at [facility] (9/6-9/7) for HD initiation but left AMA before HD was started, who is presenting with chills and lower back pain. Patient states that she has had worsening chills since Friday and this morning woke up with 10/10 back pain. Patient's daughter states that the patient has been more tired in the past week since her prior AMA from [facility]. Denies any shortness of breath, cough, chest pain. Denies any sick contacts or recent travel. Patient also endorses worsening leg swelling since leaving [facility]. Patient denies any SOB, chest pain, abdominal pain, nausea/vomiting, diarrhea, or urinary symptoms.    Patient previously presented to the [facility] ED for initiation of dialysis on 09/06/2023 with creatinine of 9 and elevated potassium, also with fatigue and loss of appetite. At that time she had a fingerstick glucose in the 660s stating that she did not take her insulin or Lantus for > 2 days 2/2 neuropathic pain. She was found to have anion gap metabolic acidosis (bicarb 18, VBG pH 7.33) and hyperkalemia. MICU at that time deemed that she was not in need of emergent dialysis. Patient signed out AMA on 09/07/2023 due to frustrations with delays in the process of getting a Shiley placed for HD.    ED Course:  In the ED, patient was febrile (Tmax 102.9), tachycardic (102), hypertensive (174/76mmHg), requiring 2L NC after desatting to 88% on RA. Labs significant for leukocytosis (WBC 14.78), anemia (Hb 6.7), elevated BUN (144) and creatinine (10.92), hypocalcemia (7.4). UA was wnl. RVP positive for enterorhinovirus. CXR w/ bibasilar hazy opacities.   (20 Sep 2023 13:01)      PAST MEDICAL HISTORY:  53y    PAST SURGICAL HISTORY:  Female    MEDICATIONS:  No Known Allergies      ALLERGIES:  amLODIPine   Tablet: 10 milliGRAM(s) Oral (09-20 @ 04:53)  cefepime   IVPB: 100 mL/Hr IV Intermittent (09-19 @ 23:44)  hydrALAZINE: 100 milliGRAM(s) Oral (09-20 @ 04:53)  metoprolol succinate ER: 25 milliGRAM(s) Oral (09-20 @ 04:53)  vancomycin  IVPB.: 250 mL/Hr IV Intermittent (09-19 @ 23:53)      VITALS & I/Os:  Vital Signs Last 24 Hrs  T(C): 36.7 (20 Sep 2023 14:36), Max: 39.4 (19 Sep 2023 20:45)  T(F): 98.1 (20 Sep 2023 14:36), Max: 102.9 (19 Sep 2023 20:45)  HR: 85 (20 Sep 2023 14:36) (78 - 102)  BP: 160/77 (20 Sep 2023 14:36) (114/63 - 183/94)  BP(mean): 78 (20 Sep 2023 12:08) (78 - 121)  RR: 19 (20 Sep 2023 14:36) (12 - 22)  SpO2: 100% (20 Sep 2023 14:36) (88% - 100%)    Parameters below as of 20 Sep 2023 14:36  Patient On (Oxygen Delivery Method): nasal cannula  O2 Flow (L/min): 2      I&O's Summary      Allergies: No Known Allergies    Medications (Abx/Cardiac/Anticoagulation/Blood Products)    amLODIPine   Tablet: 10 milliGRAM(s) Oral (09-20 @ 04:53)  cefepime   IVPB: 100 mL/Hr IV Intermittent (09-19 @ 23:44)  hydrALAZINE: 100 milliGRAM(s) Oral (09-20 @ 04:53)  metoprolol succinate ER: 25 milliGRAM(s) Oral (09-20 @ 04:53)  vancomycin  IVPB.: 250 mL/Hr IV Intermittent (09-19 @ 23:53)    Data:    68  T(C): 36.7  HR: 85  BP: 160/77  RR: 19  SpO2: 100%    -WBC 12.88 / HgB 7.6 / Hct 22.5 / Plt 158  -Na 138 / Cl 95 /  / Glucose 118  -K 4.6 / CO2 20 / Cr 10.33  -ALT -- / Alk Phos -- / T.Bili --  -INR 0.91 / PTT 28.6    Radiology:     ##############INCOMPLETE UNTIL ATTENDING ATTESTATION##############    Assessment/Plan:   Patient is a 54 y/o F w/ a PMHx of ESRD not on HD, poorly controlled Type 2 DM on insulin, HTN, and sarcoidosis, with a recent admission at [facility] (9/6-9/7) for HD initiation but left AMA before HD was started, who is presenting with chills and lower back pain, found to be enterorhinovirus positive on RVP with leukocytosis, as well as anemia and elevated BUN/creatinine, admitted for HD initiation and suspected viral pneumonia. Per nephro CKD V and will require HD, elevated WBC w/+RVP panel and BCx pending (09/19 received)     - case reviewed and approved for tunneled vs non-tunneled HD cathter placement pending on WBC curve BCx  - please place IR procedure order under Dr. Nito Pena   - STAT labs in AM (cbc,coags, bmp, T&S)  - hold DVT ppx for 24hrs  - NPO @ [facility]   - Pt requesting GA can consider conscious sedation pending anesthesia approval   - d/w primary team Interventional Radiology    Evaluate for Procedure: HD cathter placement     HPI: HPI:  Patient is a 54 y/o F w/ a PMHx of ESRD not on HD, poorly controlled Type 2 DM on insulin, HTN, and sarcoidosis, with a recent admission at [facility] (9/6-9/7) for HD initiation but left AMA before HD was started, who is presenting with chills and lower back pain. Patient states that she has had worsening chills since Friday and this morning woke up with 10/10 back pain. Patient's daughter states that the patient has been more tired in the past week since her prior AMA from [facility]. Denies any shortness of breath, cough, chest pain. Denies any sick contacts or recent travel. Patient also endorses worsening leg swelling since leaving [facility]. Patient denies any SOB, chest pain, abdominal pain, nausea/vomiting, diarrhea, or urinary symptoms.    Patient previously presented to the [facility] ED for initiation of dialysis on 09/06/2023 with creatinine of 9 and elevated potassium, also with fatigue and loss of appetite. At that time she had a fingerstick glucose in the 660s stating that she did not take her insulin or Lantus for > 2 days 2/2 neuropathic pain. She was found to have anion gap metabolic acidosis (bicarb 18, VBG pH 7.33) and hyperkalemia. MICU at that time deemed that she was not in need of emergent dialysis. Patient signed out AMA on 09/07/2023 due to frustrations with delays in the process of getting a Shiley placed for HD.    ED Course:  In the ED, patient was febrile (Tmax 102.9), tachycardic (102), hypertensive (174/76mmHg), requiring 2L NC after desatting to 88% on RA. Labs significant for leukocytosis (WBC 14.78), anemia (Hb 6.7), elevated BUN (144) and creatinine (10.92), hypocalcemia (7.4). UA was wnl. RVP positive for enterorhinovirus. CXR w/ bibasilar hazy opacities.   (20 Sep 2023 13:01)      PAST MEDICAL HISTORY:  53y    PAST SURGICAL HISTORY:  Female    MEDICATIONS:  No Known Allergies      ALLERGIES:  amLODIPine   Tablet: 10 milliGRAM(s) Oral (09-20 @ 04:53)  cefepime   IVPB: 100 mL/Hr IV Intermittent (09-19 @ 23:44)  hydrALAZINE: 100 milliGRAM(s) Oral (09-20 @ 04:53)  metoprolol succinate ER: 25 milliGRAM(s) Oral (09-20 @ 04:53)  vancomycin  IVPB.: 250 mL/Hr IV Intermittent (09-19 @ 23:53)      VITALS & I/Os:  Vital Signs Last 24 Hrs  T(C): 36.7 (20 Sep 2023 14:36), Max: 39.4 (19 Sep 2023 20:45)  T(F): 98.1 (20 Sep 2023 14:36), Max: 102.9 (19 Sep 2023 20:45)  HR: 85 (20 Sep 2023 14:36) (78 - 102)  BP: 160/77 (20 Sep 2023 14:36) (114/63 - 183/94)  BP(mean): 78 (20 Sep 2023 12:08) (78 - 121)  RR: 19 (20 Sep 2023 14:36) (12 - 22)  SpO2: 100% (20 Sep 2023 14:36) (88% - 100%)    Parameters below as of 20 Sep 2023 14:36  Patient On (Oxygen Delivery Method): nasal cannula  O2 Flow (L/min): 2      I&O's Summary      Allergies: No Known Allergies    Medications (Abx/Cardiac/Anticoagulation/Blood Products)    amLODIPine   Tablet: 10 milliGRAM(s) Oral (09-20 @ 04:53)  cefepime   IVPB: 100 mL/Hr IV Intermittent (09-19 @ 23:44)  hydrALAZINE: 100 milliGRAM(s) Oral (09-20 @ 04:53)  metoprolol succinate ER: 25 milliGRAM(s) Oral (09-20 @ 04:53)  vancomycin  IVPB.: 250 mL/Hr IV Intermittent (09-19 @ 23:53)    Data:    68  T(C): 36.7  HR: 85  BP: 160/77  RR: 19  SpO2: 100%    -WBC 12.88 / HgB 7.6 / Hct 22.5 / Plt 158  -Na 138 / Cl 95 /  / Glucose 118  -K 4.6 / CO2 20 / Cr 10.33  -ALT -- / Alk Phos -- / T.Bili --  -INR 0.91 / PTT 28.6    Radiology:     Assessment/Plan:   Patient is a 54 y/o F w/ a PMHx of ESRD not on HD, poorly controlled Type 2 DM on insulin, HTN, and sarcoidosis, with a recent admission at [facility] (9/6-9/7) for HD initiation but left AMA before HD was started, who is presenting with chills and lower back pain, found to be enterorhinovirus positive on RVP with leukocytosis, as well as anemia and elevated BUN/creatinine, admitted for HD initiation and suspected viral pneumonia. Per nephro CKD V and will require HD, elevated WBC w/+RVP panel and BCx pending (09/19 received)     - case reviewed and approved fo non-tunneled HD cathter placement given elevated WBC w/o speciation of BCx  - please place IR procedure order under Dr. Nito Pena 09/21  - STAT labs in AM (cbc,coags, bmp, T&S)  - hold DVT ppx   - NPO @ [facility]   - Pt requesting GA can consider conscious sedation pending anesthesia approval   - d/w primary team

## 2023-09-20 NOTE — H&P ADULT - PROBLEM SELECTOR PLAN 9
- DVT PPx: heparin subq  - Diet:   - GOC:   - Pharmacy:   - Dispo:   - Communication: Pt reports a history of having seizures for 1 day roughly 5 years ago. States that she was taking antiepileptic medication up until last year, when she stopped because she felt well. Does not remember name of medication.  - Will monitor for signs/symptoms of seizure

## 2023-09-20 NOTE — MEDICAL STUDENT ADULT H&P (EDUCATION) - NSHPPHYSICALEXAM_GEN_ALL_CORE
GENERAL APPEARANCE: Somnolent, laying in bed, breathing comfortably on nasal cannula  HEAD:  Atraumatic, Normocephalic   ENMT: No tonsillar erythema, exudates, or enlargement; Moist mucous membranes  CARDIO: Regular rate and rhythm, Normal S1, S2, no murmurs, rubs or gallops, B/L LE Pitting edema   PULM: Bibasilar crackles, no wheezes   MSK: Point tenderness at L2-L4 spine  ABDOMEN: Soft, Nontender, Nondistended; Bowel sounds present. No hepatosplenomegaly  NEUROLOGY: A&Ox3, nonfocal, moving all extremities  SKIN: warm, dry, normal color, no rash or abnormal lesions

## 2023-09-20 NOTE — H&P ADULT - HISTORY OF PRESENT ILLNESS
Patient is a 52 y/o F w/ a PMHx of ESRD not on HD, poorly controlled Type 2 DM on insulin, HTN, and sarcoidosis, with a recent admission at [facility] (9/6-9/7) for HD initiation but left AMA before HD was started, who is presenting with chills and lower back pain. Patient states that she has had worsening chills since Friday and this morning woke up with 10/10 back pain. Patient's daughter states that the patient has been more tired in the past week since her prior AMA from [facility]. Denies any shortness of breath, cough, chest pain. Denies any sick contacts or recent travel. Patient also endorses worsening leg swelling since leaving [facility]. Patient denies any SOB, chest pain, abdominal pain, nausea/vomiting, diarrhea, or urinary symptoms.    Patient previously presented to the [facility] ED for initiation of dialysis on 09/06/2023 with creatinine of 9 and elevated potassium, also with fatigue and loss of appetite. At that time she had a fingerstick glucose in the 660s stating that she did not take her insulin or Lantus for > 2 days 2/2 neuropathic pain. She was found to have anion gap metabolic acidosis (bicarb 18, VBG pH 7.33) and hyperkalemia. MICU at that time deemed that she was not in need of emergent dialysis. Patient signed out AMA on 09/07/2023 due to frustrations with delays in the process of getting a Shiley placed for HD.    ED Course:  In the ED, patient was febrile (Tmax 102.9), tachycardic (102), hypertensive (174/76mmHg), requiring 2L NC after desatting to 88% on RA. Labs significant for leukocytosis (WBC 14.78), anemia (Hb 6.7), elevated BUN (144) and creatinine (10.92), hypocalcemia (7.4). UA was wnl. RVP positive for enterorhinovirus. CXR w/ bibasilar hazy opacities.

## 2023-09-20 NOTE — ED ADULT NURSE REASSESSMENT NOTE - NS ED NURSE REASSESS COMMENT FT1
Resident Singh JAY, teams status is offline, paged T4 in resident assignment at [phone], pending call back

## 2023-09-20 NOTE — H&P ADULT - ASSESSMENT
Patient is a 52 y/o F w/ a PMHx of ESRD not on HD, poorly controlled Type 2 DM on insulin, HTN, and sarcoidosis, with a recent admission at [facility] (9/6-9/7) for HD initiation but left AMA before HD was started, who is presenting with chills and lower back pain, found to be enterorhinovirus positive on RVP with leukocytosis, as well as anemia and elevated BUN/creatinine, admitted for HD initiation and suspected viral pneumonia.

## 2023-09-20 NOTE — PATIENT PROFILE ADULT - FALL HARM RISK - HARM RISK INTERVENTIONS

## 2023-09-20 NOTE — ED ADULT NURSE REASSESSMENT NOTE - NS ED NURSE REASSESS COMMENT FT1
Approximately ~1200 mL of urine drained during straight cath and ~360 mL noted on post-void bladder scan. MD Mcarthur made aware via TEAMS chat.

## 2023-09-20 NOTE — PATIENT PROFILE ADULT - NSPROEXTENSIONSOFSELF_GEN_A_NUR
clothing, pink and black luggage bag, cellphone, , 2 bangels gold colored , black coat, brown and white blanket

## 2023-09-20 NOTE — H&P ADULT - PROBLEM SELECTOR PLAN 5
Patient with a history of hypertension, on home amlodipine 10mg qd, hydralazine 100mg TID, Toprol XL 25mg qd.   - Will restart patient's home BP regimen  - Will hold patient's home diuretics (bumetanide, metolazone) given ESRD status Pt with a history of type 2 diabetes mellitus requiring insulin at home. Home regimen: Basaglar 35 units qhs with missed doses, Novolog 10 units premeal with missed doses, Trulicity 1.5mg SQ weekly with missed doses,   - A1c 10.4% on 9/6/23  - Endocrinology consulted on prior admission, rec Lantus 28 units qhs and Admelog 7/7/7 TID premeal  - Will start Lantus 14 units qhs given patient's decreased PO intake and current NPO status  - Start Admelog correctional scale q6h (for NPO)  - FS q6h (while NPO)

## 2023-09-20 NOTE — H&P ADULT - PROBLEM SELECTOR PLAN 8
Pt reports a history of having seizures for 1 day roughly 5 years ago. States that she was taking antiepileptic medication up until last year, when she stopped because she felt well. Does not remember name of medication.  - Will monitor for signs/symptoms of seizure Patient w/ a history of sarcoidosis, takes prednisone 10mg qd at home.  - c/w prednisone 10mg qd

## 2023-09-20 NOTE — CONSULT NOTE ADULT - SUBJECTIVE AND OBJECTIVE BOX
[facility] DIVISION OF KIDNEY DISEASES AND HYPERTENSION -- [phone]  -- INITIAL CONSULT NOTE  --------------------------------------------------------------------------------  HPI:  Patient is a 54 y/o F w/ a PMHx of ESRD not on HD, poorly controlled Type 2 DM on insulin, HTN, and sarcoidosis, with a recent admission at [facility] (9/6-9/7) for HD initiation but left AMA before HD was started, who is presenting with chills and lower back pain. Patient states she has been having chills and feeling feverish (didn't check temp) at home for last 3-4 days. Denies any shortness of breath, cough, chest pain, abdominal pain, nausea/vomiting, diarrhea, or urinary symptoms.     Nephrology consulted for ESRD management.       PAST HISTORY  --------------------------------------------------------------------------------  PAST MEDICAL & SURGICAL HISTORY:  Sarcoidosis      Diabetes      Hypertension      Hyperlipidemia      Chronic kidney disease (CKD), stage III (moderate)      Seizure disorder      No significant past surgical history        FAMILY HISTORY:  Family history of diabetes mellitus in first degree relative      PAST SOCIAL HISTORY:    ALLERGIES & MEDICATIONS  --------------------------------------------------------------------------------  Allergies    No Known Allergies    Intolerances      Standing Inpatient Medications  amLODIPine   Tablet 10 milliGRAM(s) Oral daily  atorvastatin 80 milliGRAM(s) Oral at bedtime  calcium acetate 667 milliGRAM(s) Oral three times a day with meals  dextrose 5%. 1000 milliLiter(s) IV Continuous <Continuous>  dextrose 5%. 1000 milliLiter(s) IV Continuous <Continuous>  dextrose 50% Injectable 12.5 Gram(s) IV Push once  dextrose 50% Injectable 25 Gram(s) IV Push once  dextrose 50% Injectable 25 Gram(s) IV Push once  famotidine    Tablet 20 milliGRAM(s) Oral daily  gabapentin 100 milliGRAM(s) Oral three times a day  glucagon  Injectable 1 milliGRAM(s) IntraMuscular once  heparin   Injectable 5000 Unit(s) SubCutaneous every 8 hours  hydrALAZINE 100 milliGRAM(s) Oral three times a day  insulin glargine Injectable (LANTUS) 14 Unit(s) SubCutaneous at bedtime  insulin lispro (ADMELOG) corrective regimen sliding scale   SubCutaneous every 6 hours  metoprolol succinate ER 25 milliGRAM(s) Oral daily  predniSONE   Tablet 10 milliGRAM(s) Oral daily  sodium bicarbonate 650 milliGRAM(s) Oral three times a day    PRN Inpatient Medications  dextrose Oral Gel 15 Gram(s) Oral once PRN      REVIEW OF SYSTEMS  --------------------------------------------------------------------------------  as per HPI    All other systems were reviewed and are negative, except as noted.    VITALS/PHYSICAL EXAM  --------------------------------------------------------------------------------  T(C): 36.7 (09-20-23 @ 12:08), Max: 39.4 (09-19-23 @ 20:45)  HR: 78 (09-20-23 @ 12:08) (78 - 102)  BP: 114/63 (09-20-23 @ 12:08) (114/63 - 183/94)  RR: 14 (09-20-23 @ 12:08) (12 - 22)  SpO2: 100% (09-20-23 @ 12:08) (88% - 100%)  Wt(kg): --    Weight (kg): 68 (09-19-23 @ 20:45)        Physical Exam:  	Gen: NAD  	HEENT: Anicteric  	Pulm: Bibasilar crackles   	CV: S1S2+  	Abd: Soft, +BS , NT  	Ext: b/l LE 1+edema   	Neuro: Awake, grossly intact CN.   	Skin: Warm and dry    LABS/STUDIES  --------------------------------------------------------------------------------              7.6    12.88 >-----------<  158      [09-20-23 @ 05:11]              22.5     138  |  95  |  128  ----------------------------<  118      [09-20-23 @ 05:11]  4.6   |  20  |  10.33        Ca     6.7     [09-20-23 @ 05:11]      Mg     3.1     [09-19-23 @ 22:34]      Phos  8.9     [09-19-23 @ 22:34]    TPro  6.9  /  Alb  3.6  /  TBili  0.4  /  DBili  x   /  AST  13  /  ALT  12  /  AlkPhos  35  [09-19-23 @ 22:34]          Creatinine Trend:  SCr 10.33 [09-20 @ 05:11]  SCr 10.92 [09-19 @ 22:34]  SCr 9.96 [09-07 @ 06:50]  SCr 9.49 [09-07 @ 01:40]  SCr 9.64 [09-06 @ 20:47]    Urinalysis - [09-20-23 @ 05:11]      Color Yellow / Appearance Clear / SG 1.019 / pH 7.0      Gluc 200 mg/dL / Ketone Negative  / Bili Negative / Urobili <2 mg/dL       Blood Trace / Protein 300 mg/dL / Leuk Est Negative / Nitrite Negative      RBC 2 / WBC 1 / Hyaline 7 / Gran  / Sq Epi  / Non Sq Epi  / Bacteria Negative      HIV Nonreactive The HIV Ag/Ab Combo test performed screens for HIV-1 p24  antigen, antibodies to HIV-1 (group M and group O), and  antibodies to HIV-2. All specimens repeatedly reactive  will reflex to an HIV 1/2 antibody confirmation and  differentiation test. This assay detects p24 antigen which  may be present prior to the development of HIV antibodies,  therefore a reactive result with a negative HIV 1/2 AB  Confirmation should be followed up with HIV-1 RNA, HIV-2  RNA and repeat testing in 4-8 weeks. A nonreactive result  does not preclude previous exposure to or infection with  HIV-1 or HIV-2. [facility] prohibits disclosure of this  result to any unauthorized party.      [01-29-19 @ 06:30]    JOSLYN: titer Negative, pattern --      [01-28-23 @ 07:29]  dsDNA <12      [01-28-23 @ 07:29]    Tacrolimus  Cyclosporine  Sirolimus  Mycophenolate  BK PCR  CMV PCR  Parvo PCR  EBV PCR

## 2023-09-20 NOTE — H&P ADULT - PROBLEM SELECTOR PLAN 7
Patient w/ a history of sarcoidosis, takes prednisone 10mg qd at home.  - c/w prednisone 10mg qd Pt w/ a history of HLD, on home rosuvastatin 40mg qd  - Will start atorvastatin 40mg qhs

## 2023-09-20 NOTE — MEDICAL STUDENT ADULT H&P (EDUCATION) - HISTORY OF PRESENT ILLNESS
52 yo F with PMHx of CKD not on dialysis, poorly controlled DM on insulin and Lantus, HTN, HLD, Sarcoidosis, GERD, and BASIM who presented to the [facility] ED for initiation of dialysis on 09/06/2023 with creatinine of 9 and elevated potassium, also with fatigue and loss of appetite. At that time she had a fingerstick glucose in the 660s stating that she did not take her insulin or Lantus for > 2 days 2/2 neuropathic pain. She was found to have anion gap metabolic acidosis (bicarb 18, VBG pH 7.33) and hyperkalemia. MICU at that time deemed that she was not in need of emergent dialysis. Signed out AMA on 09/07/2023. Patient presented yesterday 9/19 for worsening chills since Friday, 10/10 back pain and excessive fatigue. She has no sick contacts, no recent travel. No SOB. The daughter reports worsening leg swelling recently. Daughter denies any changes to her mental status.

## 2023-09-20 NOTE — H&P ADULT - NSHPLABSRESULTS_GEN_ALL_CORE
.                  7.6    12.88 )-----------( 158      ( 20 Sep 2023 05:11 )                 22.5       138  |  95<L>  |  128<H>  ----------------------------<  118<H>  4.6   |  20<L>  |  10.33<H>    Ca    6.7<L>      20 Sep 2023 05:11  Phos  8.9       Mg     3.1         TPro  6.9  /  Alb  3.6  /  TBili  0.4  /  DBili  x   /  AST  13  /  ALT  12  /  AlkPhos  35<L>      Urinalysis Basic - ( 20 Sep 2023 05:11 )  Color: Yellow / Appearance: Clear / S.019 / pH: x  Gluc: 118 mg/dL / Ketone: Negative  / Bili: Negative / Urobili: <2 mg/dL   Blood: x / Protein: 300 mg/dL / Nitrite: Negative   Leuk Esterase: Negative / RBC: 2 /hpf / WBC 1 /HPF   Sq Epi: x / Non Sq Epi: x / Bacteria: Negative    Respiratory Viral Panel with COVID-19 by CARMEN (23 @ 22:45)    Rapid RVP Result: Detected    SARS-CoV-2: NotDetec: This Respiratory Panel uses polymerase chain reaction (PCR) to detect for  adenovirus; coronavirus (HKU1, NL63, 229E, OC43); human metapneumovirus  (hMPV); human enterovirus/rhinovirus (Entero/RV); influenza A; influenza  A/H1; influenza A/H3; influenza A/H1-2009; influenza B; parainfluenza  viruses 1, 2, 3, 4; respiratory syncytial virus; Mycoplasma pneumoniae;  Chlamydophila pneumoniae; and SARS-CoV-2.    Entero/Rhinovirus (RapRVP): Detected      RADIOLOGY:  < from: Xray Chest 1 View- PORTABLE-Urgent (23 @ 00:23) >    FINDINGS:    The heart size is not accurately assessed on this projection.  Bibasilar hazy opacities.  There is no pneumothorax. Trace bilateral pleural effusions.    IMPRESSION:  Bibasilar hazy opacities.    < end of copied text >

## 2023-09-20 NOTE — MEDICAL STUDENT ADULT H&P (EDUCATION) - NSHPSOCIALHISTORY_GEN_ALL_CORE
Patient lives with her children at home. She used to work for [employer] and [employer] but has since stopped working because of worsening medical condition. She spends most of her time sedentary at home and goes out a few times a week.

## 2023-09-20 NOTE — H&P ADULT - NSHPREVIEWOFSYSTEMS_GEN_ALL_CORE
General: +Fever. +Chills. +Fatigue. No weakness, no change in weight  HEENT: No blurry vision, no congestion, no rhinorrhea, no ear pain, no throat pain, no odynophagia,   Respiratory: No cough, no shortness of breath  Cardiac: No chest pain, no palpitations  GI: No abdominal pain, no nausea, no vomiting, no constipation, no diarrhea  : No dysuria, no hematuria  MSK: +Lower back pain. +Lower leg swelling. No arthralgias.  Neuro: No headache, no dizziness  Skin: No rashes

## 2023-09-20 NOTE — MEDICAL STUDENT ADULT H&P (EDUCATION) - NS MD HP STUD MEDICATIONS FT
negative - no change in level of consciousness
Home Medications:   · 	sevelamer carbonate 800 mg oral tablet: 2 tab(s) orally 3 times a day (with meals)  · 	sodium bicarbonate 650 mg oral tablet: 1 tab(s) orally 3 times a day  · 	hydrALAZINE 100 mg oral tablet: 1 tab(s) orally 3 times a day  · 	NovoLOG 100 units/mL injectable solution: 10 unit(s) injectable 3 times a day   · 	rosuvastatin 40 mg oral tablet: 1 tab(s) orally once a day  · 	Trulicity Pen 0.75 mg/0.5 mL subcutaneous solution: 0.75 milligram(s) subcutaneously once a week (Patient reports last dose ~1.5 weeks ago - unsure which day).  · 	ferrous sulfate 325 mg (65 mg elemental iron) oral delayed release tablet: 1 tab(s) orally once a day  · 	gabapentin 100 mg oral capsule: 1 cap(s) orally 3 times a day  · 	albuterol 90 mcg/inh inhalation aerosol: 2 puff(s) inhaled every 4 hours, As Needed  · 	amLODIPine 10 mg oral tablet: 1 tab(s) orally once a day  · 	metOLazone 5 mg oral tablet: 1 tab(s) orally once a day  · 	bumetanide 2 mg oral tablet: 1 tab(s) orally 3 times a day  · 	famotidine 20 mg oral tablet: 1 tab(s) orally once a day (at bedtime)  · 	metoprolol succinate 25 mg oral tablet, extended release: 1 tab(s) orally once a day  · 	Basaglar KwikPen 100 units/mL subcutaneous solution: 35 unit(s) subcutaneous once a day (at bedtime)  · 	predniSONE 10 mg oral tablet: 1 tab(s) orally once a day  · 	metOLazone 2.5 mg oral tablet: 1 tab(s) orally once a day

## 2023-09-20 NOTE — CONSULT NOTE ADULT - PROBLEM SELECTOR RECOMMENDATION 4
Low phos diet. Start phoslo 667 TID with meals.  Obtain PTH level.    Thank you for this consult.  John Garcia  Nephrology Fellow  Please contact me on TEAMS  After 5 pm please contact the on-call Fellow.

## 2023-09-20 NOTE — H&P ADULT - PROBLEM SELECTOR PLAN 6
Pt w/ a history of HLD, on home rosuvastatin 40mg qd  - Will start atorvastatin 40mg qhs Patient with a history of hypertension, on home amlodipine 10mg qd, hydralazine 100mg TID, Toprol XL 25mg qd.   - Will restart patient's home BP regimen  - Will hold patient's home diuretics (bumetanide, metolazone) given ESRD status

## 2023-09-20 NOTE — H&P ADULT - PROBLEM SELECTOR PLAN 10
- DVT PPx: heparin subq  - Diet:   - GOC:   - Pharmacy:   - Dispo:   - Communication: - DVT PPx: heparin subq  - Diet: NPO in case of Shiley placement  - GOC: full code  - Pharmacy: Rx pharmacy on [address]  - Dispo: pending clinical course  - Communication:

## 2023-09-20 NOTE — H&P ADULT - PROBLEM SELECTOR PLAN 3
Pt with a history of anemia in the setting of ESRD. Baseline hemoglobin ~7-8.   - Hb on admission 6.7, received 1u pRBCs  - Will check iron studies, ferritin

## 2023-09-20 NOTE — CONSULT NOTE ADULT - ASSESSMENT
Patient is a 52 y/o F w/ a PMHx of ESRD not on HD, poorly controlled Type 2 DM on insulin, HTN, and sarcoidosis here for initiation of dialysis.

## 2023-09-20 NOTE — CONSULT NOTE ADULT - PROBLEM SELECTOR RECOMMENDATION 9
Pt with h/o CKD-5 approaching Pt with h/o CKD-5 now ESRD in need for non-emergent HD. Labs acceptable. Needs access. Suggest IR eval.  Start Bicarb tabs 650 mg TID.  Consent obtained and given to HD unit.

## 2023-09-20 NOTE — ED ADULT NURSE REASSESSMENT NOTE - NS ED NURSE REASSESS COMMENT FT1
#1 unit PRBC's administered as per MD order with 2 RN's at the bedside. Type and screen resulted. Consent in chart. PT educated on signs and symptoms of transfusion reaction, verbalized understanding. PT on cardiac monitor with two large bore IV's in place. Safety and comfort maintained. Call bell within reach.

## 2023-09-20 NOTE — H&P ADULT - PROBLEM SELECTOR PLAN 4
Pt with a history of type 2 diabetes mellitus requiring insulin at home. Home regimen: Basaglar 35 units qhs with missed doses, Novolog 10 units premeal with missed doses, Trulicity 1.5mg SQ weekly with missed doses,   - A1c 10.4% on 9/6/23  - Endocrinology consulted on prior admission, rec Lantus 28 units qhs and Admelog 7/7/7 TID premeal  - Will start Lantus 14 units qhs given patient's decreased PO intake and current NPO status  - Start Admelog correctional scale q6h (for NPO)  - FS q6h (while NPO) Pt endorsing new onset 10/10 back pain starting yesterday, with focal spinal tenderness on palpation of lumbar spine (L2-L4).  - Xray lumbar spine ordered  - Pain control as needed

## 2023-09-20 NOTE — H&P ADULT - NSHPSOCIALHISTORY_GEN_ALL_CORE
Patient lives with her children at home. She used to work for "[employer] and [employer] but has since stopped working because of worsening medical condition. She spends most of her time sedentary at home and goes out a few times a week. Denies any cigarette use, alcohol use, or illicit drug use.

## 2023-09-20 NOTE — ED ADULT NURSE REASSESSMENT NOTE - NS ED NURSE REASSESS COMMENT FT1
Report received from Asia MORATAYA. Patient resting comfortably in bed at this time. A&O x4. Patient and family member updated on plan of care, pending bed assignment. VS as documented. Bed locked and lowered. Comfort and safety measures maintained.

## 2023-09-20 NOTE — PROCEDURE NOTE - ADDITIONAL PROCEDURE DETAILS
Called by primary team for difficult straight catheterization. Patient presenting CKD V requiring initiation of HD also found to have urinary retention >500cc. Patient able to void with PVR 255cc, currently comfortable. Discussed with primary team who would still like rai.     Patient was prepped and draped with sterile technique. Urethra meatal opening visible. 14F coude easily inserted with return of clear yellow urine. Balloon inflated with 10cc sterile water and rai secured to leg. Patient immediately drained 50cc and continued draining. Rai management per primary team. No contraindication for RN rai placement for future as meatal opening visible. No speciality catheters required as well.

## 2023-09-20 NOTE — ED ADULT NURSE REASSESSMENT NOTE - NS ED NURSE REASSESS COMMENT FT1
PT bladder scanned and found to have 1,200 mL in her bladder, MD Mcarthur made aware via TEAMS. PT straight catheterized under sterile technique with 2 RN's at the bedside as per MD order. PT tolerated well. Approximately XX mL clear, yellow urine drained. UA sent as per MD order. Safety and comfort maintained. Call bell within reach.

## 2023-09-20 NOTE — MEDICAL STUDENT ADULT H&P (EDUCATION) - ASSESSMENT
54 yo F with PMHx of CKD not on dialysis, poorly controlled DM on insulin and Lantus, HTN, HLD, Sarcoidosis, GERD, and BASIM who initially presented to the [facility] ED for initiation of dialysis on 09/06 but signed out AMA on 09/07 and represented yesterday 9/19 for worsening chills since Friday, 10/10 back pain and excessive fatigue. She has no sick contacts, no recent travel. No SOB. The daughter reports worsening leg swelling. Daughter denies any changes to her mental status. The patient was febrile to 102F, with elevated WBC count to 14.88, and tachycardic to 102bpm meeting the criteria for sepsis, currently with an undetermined source.

## 2023-09-20 NOTE — H&P ADULT - NSHPPHYSICALEXAM_GEN_ALL_CORE
CONSTITUTIONAL: NAD, well-developed, tired but able to answer questions  HEENT: PERRLA, EOMI, moist mucous membranes.  NECK: Supple. No JVD.  RESPIRATORY: Normal respiratory effort. Bibasilar crackles.  CARDIOVASCULAR: Regular rate and rhythm with normal S1 and S2. No murmurs.  ABDOMEN: Soft, non-tender, non-distended. Normoactive bowel sounds, no rebound/guarding; No hepatosplenomegaly  EXTREMITIES: 1+ BLE pitting edema. 2+ peripheral pulses. No clubbing, cyanosis.  MUSCULOSKELETAL: +TTP to lumbar spine (~L2-L4). No joint swelling or tenderness to palpation  SKIN: No rashes or lesions  NEUROLOGIC: A&Ox3. No focal deficits.  PSYCH: Affect appropriate

## 2023-09-20 NOTE — H&P ADULT - PROBLEM SELECTOR PLAN 1
Pt w/ a history of ESRD, follows with Dr. Jose M Garibay outpt. Prior hospitalization at [facility] 9/6-9/7 for HD initiation, but left AMA. , Cr 10.92 on admission now.  - Pt states that she is unwilling to have the Shiley placed without being put to sleep for it  - Nephrology consulted, recommend initiation of HD on this admission w/ Shiley placement by IR  - Will check HBV surface antigen, PTH (hypocalcemia)  - Will start phoslo 667mg PO TID premeals for hyperphosphatemia  - Will start sodium bicabonate 650mg PO TID  - IR consulted for R Shiley placement, would like to defer until it can be fully determined that the pt will get more permanent access  - Will clarify if R Shiley placement with midazolam is acceptable for patient

## 2023-09-21 LAB
A1C WITH ESTIMATED AVERAGE GLUCOSE RESULT: 9.2 % — HIGH (ref 4–5.6)
ALBUMIN SERPL ELPH-MCNC: 3.3 G/DL — SIGNIFICANT CHANGE UP (ref 3.3–5)
ALP SERPL-CCNC: 43 U/L — SIGNIFICANT CHANGE UP (ref 40–120)
ALT FLD-CCNC: 9 U/L — LOW (ref 10–45)
ANION GAP SERPL CALC-SCNC: 26 MMOL/L — HIGH (ref 5–17)
AST SERPL-CCNC: 11 U/L — SIGNIFICANT CHANGE UP (ref 10–40)
BILIRUB SERPL-MCNC: 0.3 MG/DL — SIGNIFICANT CHANGE UP (ref 0.2–1.2)
BUN SERPL-MCNC: 133 MG/DL — HIGH (ref 7–23)
CA-I BLD-SCNC: 0.87 MMOL/L — LOW (ref 1.15–1.33)
CALCIUM SERPL-MCNC: 7.3 MG/DL — LOW (ref 8.4–10.5)
CALCIUM SERPL-MCNC: 8 MG/DL — LOW (ref 8.4–10.5)
CHLORIDE SERPL-SCNC: 93 MMOL/L — LOW (ref 96–108)
CO2 SERPL-SCNC: 20 MMOL/L — LOW (ref 22–31)
CREAT SERPL-MCNC: 11.42 MG/DL — HIGH (ref 0.5–1.3)
CRP SERPL-MCNC: 163 MG/L — HIGH (ref 0–4)
EGFR: 4 ML/MIN/1.73M2 — LOW
ERYTHROCYTE [SEDIMENTATION RATE] IN BLOOD: 67 MM/HR — HIGH (ref 0–20)
ESTIMATED AVERAGE GLUCOSE: 217 MG/DL — HIGH (ref 68–114)
FERRITIN SERPL-MCNC: 1 NG/ML — LOW (ref 13–330)
FOLATE SERPL-MCNC: >20 NG/ML — SIGNIFICANT CHANGE UP
GLUCOSE BLDC GLUCOMTR-MCNC: 135 MG/DL — HIGH (ref 70–99)
GLUCOSE BLDC GLUCOMTR-MCNC: 141 MG/DL — HIGH (ref 70–99)
GLUCOSE BLDC GLUCOMTR-MCNC: 174 MG/DL — HIGH (ref 70–99)
GLUCOSE BLDC GLUCOMTR-MCNC: 179 MG/DL — HIGH (ref 70–99)
GLUCOSE BLDC GLUCOMTR-MCNC: 258 MG/DL — HIGH (ref 70–99)
GLUCOSE SERPL-MCNC: 104 MG/DL — HIGH (ref 70–99)
HBV SURFACE AG SER-ACNC: SIGNIFICANT CHANGE UP
HCT VFR BLD CALC: 24.4 % — LOW (ref 34.5–45)
HGB BLD-MCNC: 8.3 G/DL — LOW (ref 11.5–15.5)
IRON SATN MFR SERPL: 14 UG/DL — LOW (ref 30–160)
IRON SATN MFR SERPL: 7 % — LOW (ref 14–50)
LDH SERPL L TO P-CCNC: 300 U/L — HIGH (ref 50–242)
MAGNESIUM SERPL-MCNC: 3.1 MG/DL — HIGH (ref 1.6–2.6)
MCHC RBC-ENTMCNC: 28.6 PG — SIGNIFICANT CHANGE UP (ref 27–34)
MCHC RBC-ENTMCNC: 34 GM/DL — SIGNIFICANT CHANGE UP (ref 32–36)
MCV RBC AUTO: 84.1 FL — SIGNIFICANT CHANGE UP (ref 80–100)
NRBC # BLD: 0 /100 WBCS — SIGNIFICANT CHANGE UP (ref 0–0)
PHOSPHATE SERPL-MCNC: 10.6 MG/DL — HIGH (ref 2.5–4.5)
PLATELET # BLD AUTO: 162 K/UL — SIGNIFICANT CHANGE UP (ref 150–400)
POTASSIUM SERPL-MCNC: 5.2 MMOL/L — SIGNIFICANT CHANGE UP (ref 3.5–5.3)
POTASSIUM SERPL-SCNC: 5.2 MMOL/L — SIGNIFICANT CHANGE UP (ref 3.5–5.3)
PROCALCITONIN SERPL-MCNC: 0.94 NG/ML — HIGH (ref 0.02–0.1)
PROT SERPL-MCNC: 6.7 G/DL — SIGNIFICANT CHANGE UP (ref 6–8.3)
PTH-INTACT FLD-MCNC: 7 PG/ML — LOW (ref 15–65)
RBC # BLD: 2.9 M/UL — LOW (ref 3.8–5.2)
RBC # BLD: 2.91 M/UL — LOW (ref 3.8–5.2)
RBC # FLD: 12.3 % — SIGNIFICANT CHANGE UP (ref 10.3–14.5)
RETICS #: 50.6 K/UL — SIGNIFICANT CHANGE UP (ref 25–125)
RETICS/RBC NFR: 1.7 % — SIGNIFICANT CHANGE UP (ref 0.5–2.5)
SODIUM SERPL-SCNC: 139 MMOL/L — SIGNIFICANT CHANGE UP (ref 135–145)
TIBC SERPL-MCNC: 191 UG/DL — LOW (ref 220–430)
UIBC SERPL-MCNC: 178 UG/DL — SIGNIFICANT CHANGE UP (ref 110–370)
VIT B12 SERPL-MCNC: 904 PG/ML — SIGNIFICANT CHANGE UP (ref 232–1245)
VIT D25+D1,25 OH+D1,25 PNL SERPL-MCNC: <5 PG/ML — LOW (ref 19.9–79.3)
WBC # BLD: 9.3 K/UL — SIGNIFICANT CHANGE UP (ref 3.8–10.5)
WBC # FLD AUTO: 9.3 K/UL — SIGNIFICANT CHANGE UP (ref 3.8–10.5)

## 2023-09-21 PROCEDURE — 36556 INSERT NON-TUNNEL CV CATH: CPT

## 2023-09-21 PROCEDURE — 99233 SBSQ HOSP IP/OBS HIGH 50: CPT | Mod: GC

## 2023-09-21 PROCEDURE — 76937 US GUIDE VASCULAR ACCESS: CPT | Mod: 26

## 2023-09-21 PROCEDURE — 77001 FLUOROGUIDE FOR VEIN DEVICE: CPT | Mod: 26

## 2023-09-21 RX ORDER — CALCIUM GLUCONATE 100 MG/ML
2 VIAL (ML) INTRAVENOUS ONCE
Refills: 0 | Status: COMPLETED | OUTPATIENT
Start: 2023-09-21 | End: 2023-09-21

## 2023-09-21 RX ORDER — ACETAMINOPHEN 500 MG
650 TABLET ORAL ONCE
Refills: 0 | Status: COMPLETED | OUTPATIENT
Start: 2023-09-21 | End: 2023-09-21

## 2023-09-21 RX ORDER — FERROUS SULFATE 325(65) MG
325 TABLET ORAL DAILY
Refills: 0 | Status: DISCONTINUED | OUTPATIENT
Start: 2023-09-21 | End: 2023-09-21

## 2023-09-21 RX ORDER — CHLORHEXIDINE GLUCONATE 213 G/1000ML
1 SOLUTION TOPICAL
Refills: 0 | Status: DISCONTINUED | OUTPATIENT
Start: 2023-09-21 | End: 2023-09-28

## 2023-09-21 RX ORDER — IRON SUCROSE 20 MG/ML
200 INJECTION, SOLUTION INTRAVENOUS EVERY 24 HOURS
Refills: 0 | Status: COMPLETED | OUTPATIENT
Start: 2023-09-21 | End: 2023-09-25

## 2023-09-21 RX ORDER — SODIUM CHLORIDE 9 MG/ML
10 INJECTION INTRAMUSCULAR; INTRAVENOUS; SUBCUTANEOUS
Refills: 0 | Status: DISCONTINUED | OUTPATIENT
Start: 2023-09-21 | End: 2023-09-28

## 2023-09-21 RX ORDER — ONDANSETRON 8 MG/1
4 TABLET, FILM COATED ORAL ONCE
Refills: 0 | Status: DISCONTINUED | OUTPATIENT
Start: 2023-09-26 | End: 2023-09-28

## 2023-09-21 RX ORDER — ACETAMINOPHEN 500 MG
650 TABLET ORAL EVERY 6 HOURS
Refills: 0 | Status: DISCONTINUED | OUTPATIENT
Start: 2023-09-21 | End: 2023-09-28

## 2023-09-21 RX ADMIN — Medication 3: at 12:45

## 2023-09-21 RX ADMIN — CEFEPIME 100 MILLIGRAM(S): 1 INJECTION, POWDER, FOR SOLUTION INTRAMUSCULAR; INTRAVENOUS at 05:19

## 2023-09-21 RX ADMIN — Medication 200 GRAM(S): at 16:47

## 2023-09-21 RX ADMIN — INSULIN GLARGINE 14 UNIT(S): 100 INJECTION, SOLUTION SUBCUTANEOUS at 21:26

## 2023-09-21 RX ADMIN — Medication 650 MILLIGRAM(S): at 05:19

## 2023-09-21 RX ADMIN — Medication 650 MILLIGRAM(S): at 21:27

## 2023-09-21 RX ADMIN — IRON SUCROSE 110 MILLIGRAM(S): 20 INJECTION, SOLUTION INTRAVENOUS at 16:00

## 2023-09-21 RX ADMIN — GABAPENTIN 100 MILLIGRAM(S): 400 CAPSULE ORAL at 05:19

## 2023-09-21 RX ADMIN — AMLODIPINE BESYLATE 10 MILLIGRAM(S): 2.5 TABLET ORAL at 05:19

## 2023-09-21 RX ADMIN — Medication 10 MILLIGRAM(S): at 05:18

## 2023-09-21 RX ADMIN — Medication 650 MILLIGRAM(S): at 21:28

## 2023-09-21 RX ADMIN — Medication 100 MILLIGRAM(S): at 21:28

## 2023-09-21 RX ADMIN — Medication 650 MILLIGRAM(S): at 11:27

## 2023-09-21 RX ADMIN — GABAPENTIN 100 MILLIGRAM(S): 400 CAPSULE ORAL at 21:27

## 2023-09-21 RX ADMIN — Medication 100 MILLIGRAM(S): at 05:19

## 2023-09-21 RX ADMIN — Medication 25 MILLIGRAM(S): at 05:19

## 2023-09-21 RX ADMIN — ATORVASTATIN CALCIUM 80 MILLIGRAM(S): 80 TABLET, FILM COATED ORAL at 21:27

## 2023-09-21 RX ADMIN — Medication 650 MILLIGRAM(S): at 10:50

## 2023-09-21 RX ADMIN — Medication 650 MILLIGRAM(S): at 21:48

## 2023-09-21 NOTE — DIETITIAN INITIAL EVALUATION ADULT - PERTINENT MEDS FT
MEDICATIONS  (STANDING):  amLODIPine   Tablet 10 milliGRAM(s) Oral daily  atorvastatin 80 milliGRAM(s) Oral at bedtime  calcium acetate 667 milliGRAM(s) Oral three times a day with meals  calcium gluconate IVPB 2 Gram(s) IV Intermittent once  cefepime   IVPB 500 milliGRAM(s) IV Intermittent every 24 hours  famotidine    Tablet 20 milliGRAM(s) Oral daily  gabapentin 100 milliGRAM(s) Oral three times a day  hydrALAZINE 100 milliGRAM(s) Oral three times a day  insulin glargine Injectable (LANTUS) 14 Unit(s) SubCutaneous at bedtime  insulin lispro (ADMELOG) corrective regimen sliding scale   SubCutaneous every 6 hours  metoprolol succinate ER 25 milliGRAM(s) Oral daily  predniSONE   Tablet 10 milliGRAM(s) Oral daily  sodium bicarbonate 650 milliGRAM(s) Oral three times a day

## 2023-09-21 NOTE — DIETITIAN INITIAL EVALUATION ADULT - PROBLEM SELECTOR PLAN 1
Pt w/ a history of ESRD, follows with Dr. Jose M Garibay outpt. Prior hospitalization at [facility] 9/6-9/7 for HD initiation, but left AMA. , Cr 10.92 on admission now.  - Pt states that she is unwilling to have the Shiley placed without being put to sleep for it  - Nephrology consulted, recommend initiation of HD on this admission w/ Shiley placement by IR  - Will check HBV surface antigen, PTH (hypocalcemia)  - Will start phoslo 667mg PO TID premeals for hyperphosphatemia  - Will start sodium bicabonate 650mg PO TID  - IR consulted for R Shiley placement, would like to defer until it can be fully determined that the pt will get more permanent access  - Will clarify if R Shiley placement with midazolam is acceptable for patient

## 2023-09-21 NOTE — DIETITIAN INITIAL EVALUATION ADULT - PROBLEM SELECTOR PLAN 10
- DVT PPx: heparin subq  - Diet: NPO in case of Shiley placement  - GOC: full code  - Pharmacy: Rx pharmacy on [address]  - Dispo: pending clinical course  - Communication:

## 2023-09-21 NOTE — DIETITIAN INITIAL EVALUATION ADULT - NS FNS DIET ORDER
Diet, Renal Restrictions:   For patients receiving Renal Replacement - No Protein Restr, No Conc K, No Conc Phos, Low Sodium  Consistent Carbohydrate {Evening Snack} (CSTCHOSN)  DASH/TLC {Sodium & Cholesterol Restricted} (DASH) (09-20-23 @ 15:32)  Diet, NPO after Midnight:      NPO Start Date: 20-Sep-2023,   NPO Start Time: 23:59 (09-20-23 @ 15:31)

## 2023-09-21 NOTE — DIETITIAN INITIAL EVALUATION ADULT - PROBLEM SELECTOR PLAN 4
Pt endorsing new onset 10/10 back pain starting yesterday, with focal spinal tenderness on palpation of lumbar spine (L2-L4).  - Xray lumbar spine ordered  - Pain control as needed

## 2023-09-21 NOTE — PROGRESS NOTE ADULT - SUBJECTIVE AND OBJECTIVE BOX
[facility] DIVISION OF KIDNEY DISEASES AND HYPERTENSION   FOLLOW UP NOTE    --------------------------------------------------------------------------------  Chief Complaint:    24 hour events/subjective: Pt. was seen and examined today. Feels tired and lethargic, otherwise able to converse appropriately.       PAST HISTORY  --------------------------------------------------------------------------------  No significant changes to PMH, PSH, FHx, SHx, unless otherwise noted    ALLERGIES & MEDICATIONS  --------------------------------------------------------------------------------  Allergies    No Known Allergies    Intolerances      Standing Inpatient Medications  amLODIPine   Tablet 10 milliGRAM(s) Oral daily  atorvastatin 80 milliGRAM(s) Oral at bedtime  calcium acetate 667 milliGRAM(s) Oral three times a day with meals  calcium gluconate IVPB 2 Gram(s) IV Intermittent once  cefepime   IVPB 500 milliGRAM(s) IV Intermittent every 24 hours  dextrose 5%. 1000 milliLiter(s) IV Continuous <Continuous>  dextrose 5%. 1000 milliLiter(s) IV Continuous <Continuous>  dextrose 50% Injectable 12.5 Gram(s) IV Push once  dextrose 50% Injectable 25 Gram(s) IV Push once  dextrose 50% Injectable 25 Gram(s) IV Push once  famotidine    Tablet 20 milliGRAM(s) Oral daily  ferrous    sulfate 325 milliGRAM(s) Oral daily  gabapentin 100 milliGRAM(s) Oral three times a day  glucagon  Injectable 1 milliGRAM(s) IntraMuscular once  hydrALAZINE 100 milliGRAM(s) Oral three times a day  influenza   Vaccine 0.5 milliLiter(s) IntraMuscular once  insulin glargine Injectable (LANTUS) 14 Unit(s) SubCutaneous at bedtime  insulin lispro (ADMELOG) corrective regimen sliding scale   SubCutaneous every 6 hours  metoprolol succinate ER 25 milliGRAM(s) Oral daily  predniSONE   Tablet 10 milliGRAM(s) Oral daily  sodium bicarbonate 650 milliGRAM(s) Oral three times a day    PRN Inpatient Medications  acetaminophen     Tablet .. 650 milliGRAM(s) Oral every 6 hours PRN  dextrose Oral Gel 15 Gram(s) Oral once PRN      REVIEW OF SYSTEMS  --------------------------------------------------------------------------------  as per hpi    All other systems were reviewed and are negative, except as noted.    VITALS/PHYSICAL EXAM  --------------------------------------------------------------------------------  T(C): 36.6 (09-21-23 @ 12:58), Max: 37.7 (09-20-23 @ 19:50)  HR: 91 (09-21-23 @ 12:58) (85 - 103)  BP: 146/80 (09-21-23 @ 12:58) (146/80 - 167/88)  RR: 18 (09-21-23 @ 04:50) (18 - 19)  SpO2: 95% (09-21-23 @ 12:58) (95% - 100%)  Wt(kg): --    Weight (kg): 68 (09-19-23 @ 20:45)        Physical Exam:  	Gen: NAD  	HEENT: Anicteric  	Pulm: Bibasilar crackles   	CV: S1S2+  	Abd: Soft, +BS , NT  	Ext: b/l LE 1+edema   	Neuro: Awake, grossly intact CN.   	Skin: Warm and dry    LABS/STUDIES  --------------------------------------------------------------------------------              8.3    9.30  >-----------<  162      [09-21-23 @ 05:13]              24.4     139  |  93  |  133  ----------------------------<  104      [09-21-23 @ 05:15]  5.2   |  20  |  11.42        Ca     8.0     [09-21-23 @ 05:15]      iCa    0.87     [09-21 @ 05:13]      Mg     3.1     [09-21-23 @ 05:15]      Phos  10.6     [09-21-23 @ 05:15]    TPro  6.7  /  Alb  3.3  /  TBili  0.3  /  DBili  x   /  AST  11  /  ALT  9   /  AlkPhos  43  [09-21-23 @ 05:15]              [09-21-23 @ 05:15]    Creatinine Trend:  SCr 11.42 [09-21 @ 05:15]  SCr 10.33 [09-20 @ 05:11]  SCr 10.92 [09-19 @ 22:34]  SCr 9.96 [09-07 @ 06:50]  SCr 9.49 [09-07 @ 01:40]    Urinalysis - [09-21-23 @ 05:15]      Color  / Appearance  / SG  / pH       Gluc 104 / Ketone   / Bili  / Urobili        Blood  / Protein  / Leuk Est  / Nitrite       RBC  / WBC  / Hyaline  / Gran  / Sq Epi  / Non Sq Epi  / Bacteria       HBsAg Nonreact      [09-21-23 @ 05:15]      Tacrolimus  Cyclosporine  Sirolimus  Mycophenolate  BK PCR  CMV PCR  Parvo PCR  EBV PCR [facility] DIVISION OF KIDNEY DISEASES AND HYPERTENSION   FOLLOW UP NOTE    --------------------------------------------------------------------------------  Chief Complaint:    24 hour events/subjective: Pt. was seen and examined today. Feels tired and lethargic, otherwise able to converse appropriately.   no acute events  renal fxn worsening    PAST HISTORY  --------------------------------------------------------------------------------  No significant changes to PMH, PSH, FHx, SHx, unless otherwise noted    ALLERGIES & MEDICATIONS  --------------------------------------------------------------------------------  Allergies    No Known Allergies    Intolerances      Standing Inpatient Medications  amLODIPine   Tablet 10 milliGRAM(s) Oral daily  atorvastatin 80 milliGRAM(s) Oral at bedtime  calcium acetate 667 milliGRAM(s) Oral three times a day with meals  calcium gluconate IVPB 2 Gram(s) IV Intermittent once  cefepime   IVPB 500 milliGRAM(s) IV Intermittent every 24 hours  dextrose 5%. 1000 milliLiter(s) IV Continuous <Continuous>  dextrose 5%. 1000 milliLiter(s) IV Continuous <Continuous>  dextrose 50% Injectable 12.5 Gram(s) IV Push once  dextrose 50% Injectable 25 Gram(s) IV Push once  dextrose 50% Injectable 25 Gram(s) IV Push once  famotidine    Tablet 20 milliGRAM(s) Oral daily  ferrous    sulfate 325 milliGRAM(s) Oral daily  gabapentin 100 milliGRAM(s) Oral three times a day  glucagon  Injectable 1 milliGRAM(s) IntraMuscular once  hydrALAZINE 100 milliGRAM(s) Oral three times a day  influenza   Vaccine 0.5 milliLiter(s) IntraMuscular once  insulin glargine Injectable (LANTUS) 14 Unit(s) SubCutaneous at bedtime  insulin lispro (ADMELOG) corrective regimen sliding scale   SubCutaneous every 6 hours  metoprolol succinate ER 25 milliGRAM(s) Oral daily  predniSONE   Tablet 10 milliGRAM(s) Oral daily  sodium bicarbonate 650 milliGRAM(s) Oral three times a day    PRN Inpatient Medications  acetaminophen     Tablet .. 650 milliGRAM(s) Oral every 6 hours PRN  dextrose Oral Gel 15 Gram(s) Oral once PRN      REVIEW OF SYSTEMS  --------------------------------------------------------------------------------  as per hpi    All other systems were reviewed and are negative, except as noted.    VITALS/PHYSICAL EXAM  --------------------------------------------------------------------------------  T(C): 36.6 (09-21-23 @ 12:58), Max: 37.7 (09-20-23 @ 19:50)  HR: 91 (09-21-23 @ 12:58) (85 - 103)  BP: 146/80 (09-21-23 @ 12:58) (146/80 - 167/88)  RR: 18 (09-21-23 @ 04:50) (18 - 19)  SpO2: 95% (09-21-23 @ 12:58) (95% - 100%)  Wt(kg): --    Weight (kg): 68 (09-19-23 @ 20:45)        Physical Exam:  	Gen: NAD  	HEENT: Anicteric  	Pulm: Bibasilar crackles   	CV: S1S2+  	Abd: Soft, +BS , NT  	Ext: b/l LE 1+edema   	Neuro: Awake, grossly intact CN.   	Skin: Warm and dry    LABS/STUDIES  --------------------------------------------------------------------------------              8.3    9.30  >-----------<  162      [09-21-23 @ 05:13]              24.4     139  |  93  |  133  ----------------------------<  104      [09-21-23 @ 05:15]  5.2   |  20  |  11.42        Ca     8.0     [09-21-23 @ 05:15]      iCa    0.87     [09-21 @ 05:13]      Mg     3.1     [09-21-23 @ 05:15]      Phos  10.6     [09-21-23 @ 05:15]    TPro  6.7  /  Alb  3.3  /  TBili  0.3  /  DBili  x   /  AST  11  /  ALT  9   /  AlkPhos  43  [09-21-23 @ 05:15]              [09-21-23 @ 05:15]    Creatinine Trend:  SCr 11.42 [09-21 @ 05:15]  SCr 10.33 [09-20 @ 05:11]  SCr 10.92 [09-19 @ 22:34]  SCr 9.96 [09-07 @ 06:50]  SCr 9.49 [09-07 @ 01:40]    Urinalysis - [09-21-23 @ 05:15]      Color  / Appearance  / SG  / pH       Gluc 104 / Ketone   / Bili  / Urobili        Blood  / Protein  / Leuk Est  / Nitrite       RBC  / WBC  / Hyaline  / Gran  / Sq Epi  / Non Sq Epi  / Bacteria       HBsAg Nonreact      [09-21-23 @ 05:15]      Tacrolimus  Cyclosporine  Sirolimus  Mycophenolate  BK PCR  CMV PCR  Parvo PCR  EBV PCR

## 2023-09-21 NOTE — PROGRESS NOTE ADULT - ATTENDING COMMENTS
pt seen and examined this morning in ER by me     Progressed CKD Stage V  was at [facility] early this month for dialysis initiation and she left [facility]  didn't feel well these past three days- fever, body aches, weakness so presented to [facility]  #viral syndrome now +rhinovirus; supportive management as needed  # ckd stage V  bun/cr increasing  will need to initiate hd this admission  please contact IR for dolores  # anemia in ckd  +iron deficiency- needs venofer 200mg iv x 5 days  #hypocalcemia  pth low- primary hypopth?  #hyperphosphatemia- start phoslo 667mg po tid with meals  #met acidosis  start sodium bicarb tabs 650mg po tid  #hyperkalemia  lokelma as needed  low k diet  monitor k trend  #plan HD today #1 after IR places access

## 2023-09-21 NOTE — DIETITIAN INITIAL EVALUATION ADULT - PROBLEM SELECTOR PLAN 6
Patient with a history of hypertension, on home amlodipine 10mg qd, hydralazine 100mg TID, Toprol XL 25mg qd.   - Will restart patient's home BP regimen  - Will hold patient's home diuretics (bumetanide, metolazone) given ESRD status

## 2023-09-21 NOTE — PROGRESS NOTE ADULT - PROBLEM SELECTOR PLAN 4
-pt w/ biopsy proven sarcoidosis in 2010   -no lymphadenopathy noted on PE   -c/w home prednisone Pt endorsing new onset 10/10 back pain starting yesterday, with focal spinal tenderness on palpation of lumbar spine (L2-L4).  - Xray lumbar spine ordered  - Pain control as needed Pt endorsing new onset 10/10 back pain starting yesterday, with focal spinal tenderness on palpation of lumbar spine (L2-L4).  - Xray lumbar spine w/ no fractures/dislocations  - Pain control as needed

## 2023-09-21 NOTE — DIETITIAN INITIAL EVALUATION ADULT - PERTINENT LABORATORY DATA
09-21    139  |  93<L>  |  133<H>  ----------------------------<  104<H>  5.2   |  20<L>  |  11.42<H>    Ca    8.0<L>      21 Sep 2023 05:15  Phos  10.6     09-21  Mg     3.1     09-21    TPro  6.7  /  Alb  3.3  /  TBili  0.3  /  DBili  x   /  AST  11  /  ALT  9<L>  /  AlkPhos  43  09-21  POCT Blood Glucose.: 174 mg/dL (09-21-23 @ 09:03)  A1C with Estimated Average Glucose Result: 9.2 % (09-21-23 @ 05:16)  A1C with Estimated Average Glucose Result: 10.4 % (09-06-23 @ 17:15)  A1C with Estimated Average Glucose Result: 10.6 % (01-27-23 @ 18:12)

## 2023-09-21 NOTE — PROGRESS NOTE ADULT - ASSESSMENT
52 y.o. PMH CKD, HTN, sarcoidosis, DMII who presents w/ SOB and LE edema. Labs and VSS significant for leukocytosis, tachypnea and new O2 requirement and imaging consistent w/ b/l infiltrates suggestive of pulm edema vs infection. Pt admitted for AHRF likely i/s/o volume overload due to underdiuresis i/s/o CKD vs CHF.  Patient is a 54 y/o F w/ a PMHx of ESRD not on HD, poorly controlled Type 2 DM on insulin, HTN, and sarcoidosis, with a recent admission at [facility] (9/6-9/7) for HD initiation but left AMA before HD was started, who is presenting with chills and lower back pain, found to be enterorhinovirus positive on RVP with leukocytosis, as well as anemia and elevated BUN/creatinine, admitted for HD initiation and suspected viral pneumonia.

## 2023-09-21 NOTE — PROGRESS NOTE ADULT - PROBLEM SELECTOR PLAN 1
Pt with h/o CKD-5 now ESRD in need for non-emergent HD. BUN worsening, K borderline high. Plan for IR PC placement followed by HD session today.

## 2023-09-21 NOTE — PRE-ANESTHESIA EVALUATION ADULT - NSANTHPMHFT_GEN_ALL_CORE
54 y/o F w/ a PMHx of ESRD not on HD, poorly controlled Type 2 DM on insulin, HTN, and sarcoidosis to undergo non-tunnelled catheter placement.

## 2023-09-21 NOTE — DIETITIAN INITIAL EVALUATION ADULT - OBTAIN CURRENT WEIGHT
PT IS VERY MINIMAL WITH NURSING ASSESSMENT. PT DOES NOT RATE ANXIETY OR DEPRESSION NUMERICALLY. PT ISOLATES TO HIS ROOM. DENIES ANY SI, HI, OR AVH.    yes

## 2023-09-21 NOTE — PROGRESS NOTE ADULT - PROBLEM SELECTOR PLAN 10
- DVT PPx: heparin subq  - Diet: NPO in case of Shiley placement  - GOC: full code  - Pharmacy: Rx pharmacy on [address]  - Dispo: pending clinical course  - Communication: - DVT PPx: heparin subq  - Diet: NPO for Shiley placement  - GOC: full code  - Pharmacy: Rx pharmacy on [address]  - Dispo: pending clinical course  - Communication:

## 2023-09-21 NOTE — DIETITIAN INITIAL EVALUATION ADULT - NSFNSGIIOFT_GEN_A_CORE
Patient reports no nausea/vomiting, diarrhea, or constipation. Last BM yesterday per patient and per flowsheets. Bowel regimen: none.

## 2023-09-21 NOTE — DIETITIAN INITIAL EVALUATION ADULT - REASON INDICATOR FOR ASSESSMENT
RD assessment warranted for: MST score 2 or >. Chart reviewed, events noted.  Source: medical record, patient.

## 2023-09-21 NOTE — DIETITIAN INITIAL EVALUATION ADULT - REASON FOR ADMISSION
Sepsis    Per chart: "Patient is a 54 y/o F w/ a PMHx of ESRD not on HD, poorly controlled Type 2 DM on insulin, HTN, and sarcoidosis, with a recent admission at [facility] (9/6-9/7) for HD initiation but left AMA before HD was started, who is presenting with chills and lower back pain, found to be enterorhinovirus positive on RVP with leukocytosis, as well as anemia and elevated BUN/creatinine, admitted for HD initiation and suspected viral pneumonia."

## 2023-09-21 NOTE — DIETITIAN INITIAL EVALUATION ADULT - OTHER CALCULATIONS
Defer fluid needs to team.  Estimated nutrient needs based on dosing weight 149.9lb, with consideration for malnutrition, hemodialysis.

## 2023-09-21 NOTE — PROGRESS NOTE ADULT - PROBLEM SELECTOR PLAN 5
-pt w/ home regimen lantus 36U and ademolog 10U premeal  -c/w lantus 25U, admelog 7U and CANDI   -A1c 10.6 in January Pt with a history of type 2 diabetes mellitus requiring insulin at home. Home regimen: Basaglar 35 units qhs with missed doses, Novolog 10 units premeal with missed doses, Trulicity 1.5mg SQ weekly with missed doses,   - A1c 10.4% on 9/6/23  - Endocrinology consulted on prior admission, rec Lantus 28 units qhs and Admelog 7/7/7 TID premeal  - Will start Lantus 14 units qhs given patient's decreased PO intake and current NPO status  - Start Admelog correctional scale q6h (for NPO)  - FS q6h (while NPO) Pt with a history of type 2 diabetes mellitus requiring insulin at home. Home regimen: Basaglar 35 units qhs with missed doses, Novolog 10 units premeal with missed doses, Trulicity 1.5mg SQ weekly with missed doses,   - A1c 10.4% on 9/6/23  - Endocrinology consulted on prior admission, rec Lantus 28 units qhs and Admelog 7/7/7 TID premeal  - c/w Lantus 14 units qhs given patient's decreased PO intake and current NPO status  - c/w Admelog correctional scale q6h (for NPO)  - FS q6h (while NPO)

## 2023-09-21 NOTE — DIETITIAN INITIAL EVALUATION ADULT - ORAL INTAKE PTA/DIET HISTORY
Patient reports decreased appetite and PO intake PTA since becoming sick a few weeks ago. Patient reports eating 1-2 meals/day, no snacking in between. Patient reports not following any dietary restrictions at home due to decreased appetite and PO intake. Confirms NKFA.

## 2023-09-21 NOTE — DIETITIAN INITIAL EVALUATION ADULT - PERSON TAUGHT/METHOD
Encouraged protein-energy intake, discussed eating small frequent meals to maximize PO intake and avoid skipping meals which can negatively impact blood glucose levels. Discussed use of oral nutrition supplements and keeping non-perishable food aside from tray to have available.   Discussed importance of monitoring carbohydrate intake for glycemic control at home, and to be mindful of sodium, potassium, and phosphorous intake for renal function./verbal instruction/written material/patient instructed

## 2023-09-21 NOTE — PROGRESS NOTE ADULT - PROBLEM SELECTOR PLAN 6
-c/w home meds coreg, hydralazine and amlodipine Patient with a history of hypertension, on home amlodipine 10mg qd, hydralazine 100mg TID, Toprol XL 25mg qd.   - Will restart patient's home BP regimen  - Will hold patient's home diuretics (bumetanide, metolazone) given ESRD status

## 2023-09-21 NOTE — PROGRESS NOTE ADULT - PROBLEM SELECTOR PLAN 9
Pt reports a history of having seizures for 1 day roughly 5 years ago. States that she was taking antiepileptic medication up until last year, when she stopped because she felt well. Does not remember name of medication.  - Will monitor for signs/symptoms of seizure

## 2023-09-21 NOTE — PROGRESS NOTE ADULT - PROBLEM SELECTOR PLAN 2
-pt endorsing worsening LE edema and SOB since being d/c from hospital off of home torsemide   -pt w/ b/l infiltrates on CXR suspicious for pulmonary edema   -PE w/ crackles b/l   -volume overload due to under diuresis i/s/o CKD vs CHF  -pt s/p bumex 4 IV in ED   -nephro c/s; appreciate recs   -transitioned to bumex 4 PO TID  -strict Is/Os  -daily weights   -POCUS w/ grossly intact cardiac function   -proBNP elevated at 7900  -TTE w/ no systolic dysfunction, likely fluid overload i/s/o CKD Pt presenting w/ chills, met sepsis criteria on admission with fever, tachycardia, and leukocytosis  - RVP+ for enterorhinovirus  - s/p 1x vancomycin and cefepime in the ED  - c/w cefepime 500mg (dose for eGFR)  - Currently requiring 2L NC, wean as tolerated  - Supportive care

## 2023-09-21 NOTE — DIETITIAN INITIAL EVALUATION ADULT - SIGNS/SYMPTOMS
<75% estimated energy needs > 7 days, mild edema, mild muscle loss Team planning to initiate hemodialysis during this admission

## 2023-09-21 NOTE — PROGRESS NOTE ADULT - PROBLEM SELECTOR PLAN 7
-c/w home rosuvastatin Pt w/ a history of HLD, on home rosuvastatin 40mg qd  - Will start atorvastatin 40mg qhs Pt w/ a history of HLD, on home rosuvastatin 40mg qd  - c/w atorvastatin 40mg qhs

## 2023-09-21 NOTE — DIETITIAN INITIAL EVALUATION ADULT - OTHER INFO
Patient unsure of UBW, reports it is usually within the 110-140lb range, but states that weight fluctuates frequently due to fluid shifts. Patient states she knows she lost weight because her clothes have been fitter looser, but is unsure of exact amount due to fluid shifts.   Dosing weight: 149.9lb (9/19)   IBW: 105lb  %IBW: 136% based on dosing weight  Weight history per [facility]: 126lb (9/7/23), 120lb (7/3/23), 118lb (5/31/23), 110lb (5/4/23), 109.3lb (4/19/23), 115lb (4/12/23), 120lb (3/29/23), 110lb (3/9/23), 122lb (2/15/23), 113lb (1/11/23), 115lb (9/12/22). - Consistent with pt's reported weight fluctuations. RD to continue to monitor weight trends as available/able.     -Pt ordered for steroids which can increase blood glucose levels; insulin regimen in-house: lantus 14 units at bedtime, admelog sliding scale q6hr.  -Ordered for calcium acetate for hyperphosphatemia; calcium gluconate for low calcium.  -Seen by RD during previous admission in January 2023 and diagnosed with severe malnutrition.  -Team planning to initiate hemodialysis during this admission per chart.

## 2023-09-21 NOTE — PROGRESS NOTE ADULT - PROBLEM SELECTOR PLAN 4
Pt. with hyperphosphatemia in the setting of ESRD. Low phos diet. Continue with phos binder. Monitor serum phosphorus, goal: 3.5-5.5.       Thank you for this consult.  John Garcia  Nephrology Fellow  Please contact me on TEAMS  After 5 pm please contact the on-call Fellow.

## 2023-09-21 NOTE — PROGRESS NOTE ADULT - PROBLEM SELECTOR PLAN 3
-pt w/ sCr 5.15 on presentation   -pt w/ baseline CKD stage 5 i/s/o uncontrolled DM  -during last hospitalization baseline sCr around 4.9  -worsening renal function possibly i/s/o volume overload   -U/A, FeUrea, urine lytes, spot TP/CR suggestive of intrinsic renal disease   -c/w sodium bicarb 650 TID   -c/w sevelamer 1600 TID w/ meals   -possibility of HD requirement depending on progression of kidney function discussed w/ pt  -s/p EPO 2/22 Pt with a history of anemia in the setting of ESRD. Baseline hemoglobin ~7-8.   - Hb on admission 6.7, received 1u pRBCs  - Will check iron studies, ferritin Pt with a history of anemia in the setting of ESRD. Baseline hemoglobin ~7-8.   - Hb on admission 6.7, received 1u pRBCs  - Iron low, ferritin low  - Will start oral ferrous sulfate

## 2023-09-21 NOTE — DIETITIAN INITIAL EVALUATION ADULT - ADD RECOMMEND
-Continue with renal restrictions and consistent carbohydrate diet restrictions, recommend discontinuing DASH/TLC restriction due to poor PO intake, patient already on low sodium through renal restrictions diet restriction. Recommend adding Glucerna 2x/day to maximize PO intake.   -Monitor PO intake, diet, weight, labs, skin, GI symptoms, and BM regularity.  -RD remains available upon request and will follow up per protocol.   -Malnutrition alert placed in chart.  -Continue with renal restrictions and consistent carbohydrate diet restrictions, recommend discontinuing DASH/TLC restriction due to poor PO intake, patient already on low sodium through renal restrictions diet restriction. Recommend adding Nepro 2x/day to maximize PO intake.   -Monitor PO intake, diet, weight, labs, skin, GI symptoms, and BM regularity.  -RD remains available upon request and will follow up per protocol.   -Malnutrition alert placed in chart.

## 2023-09-21 NOTE — DIETITIAN INITIAL EVALUATION ADULT - PROBLEM SELECTOR PLAN 5
Pt with a history of type 2 diabetes mellitus requiring insulin at home. Home regimen: Basaglar 35 units qhs with missed doses, Novolog 10 units premeal with missed doses, Trulicity 1.5mg SQ weekly with missed doses,   - A1c 10.4% on 9/6/23  - Endocrinology consulted on prior admission, rec Lantus 28 units qhs and Admelog 7/7/7 TID premeal  - Will start Lantus 14 units qhs given patient's decreased PO intake and current NPO status  - Start Admelog correctional scale q6h (for NPO)  - FS q6h (while NPO)

## 2023-09-21 NOTE — DIETITIAN INITIAL EVALUATION ADULT - ENERGY INTAKE
No intake documented per flowsheets, patient reports poor appetite and PO intake since admit. Patient currently NPO after Midnight for procedure today. No intake documented per flowsheets. Poor (<50%)

## 2023-09-21 NOTE — PROGRESS NOTE ADULT - PROBLEM SELECTOR PLAN 1
-pt w/ SIRS criteria w/ tachypnea and leukocytosis to 15 on admission w/ new O2 requirement   -CXR w/ b/l infiltrates suspicious for pulmonary edema vs infection   -pt w/ lactate wnl   -s/p cefepime and azithro in ED   -will hold abx given low suspicion for inf   -blood cx NGTD  -likely i/s/o volume overload rather than infection   -pt s/p bumex 4 IV  -diuresis as below Pt w/ a history of ESRD, follows with Dr. Jose M Garibay outpt. Prior hospitalization at [facility] 9/6-9/7 for HD initiation, but left AMA. , Cr 10.92 on admission now.  - Pt states that she is unwilling to have the Shiley placed without being put to sleep for it  - Nephrology consulted, recommend initiation of HD on this admission w/ Shiley placement by IR  - Will check HBV surface antigen, PTH (hypocalcemia)  - Will start phoslo 667mg PO TID premeals for hyperphosphatemia  - Will start sodium bicabonate 650mg PO TID  - IR consulted for R Shiley placement, would like to defer until it can be fully determined that the pt will get more permanent access  - Will clarify if R Shiley placement with midazolam is acceptable for patient Pt w/ a history of ESRD, follows with Dr. Jose M Garibay outpt. Prior hospitalization at [facility] 9/6-9/7 for HD initiation, but left AMA. , Cr 10.92 on admission now.  - Pt states that she is unwilling to have the Shiley placed without being put to sleep for it  - Nephrology consulted, recommend initiation of HD on this admission w/ Shiley placement by IR  - Will check HBV surface antigen, PTH (hypocalcemia)  - Will start phoslo 667mg PO TID premeals for hyperphosphatemia  - Will start sodium bicabonate 650mg PO TID  - IR consulted for R Shiley placement, would like to defer until it can be fully determined that the pt will get more permanent access  - Will clarify if R Shiley placement with midazolam is acceptable for patient Pt w/ a history of ESRD, follows with Dr. Jose M Garibay outpt. Prior hospitalization at [facility] 9/6-9/7 for HD initiation, but left AMA. , Cr 10.92 on admission now.  - Pt states that she is unwilling to have the Shiley placed without being put to sleep for it  - Nephrology consulted, recommend initiation of HD on this admission w/ Shiley placement by IR  - Will check HBV surface antigen, PTH (hypocalcemia)  - c/w phoslo 667mg PO TID premeals for hyperphosphatemia  - c/w sodium bicabonate 650mg PO TID  - IR consulted for R Shiley placement, plan for 9/21 with anesthesia

## 2023-09-21 NOTE — PROGRESS NOTE ADULT - ATTENDING COMMENTS
-F/u renal recs for HD and low Ca.   -IR for HD cath placement.   -C/w cefepime for now pending cultures.

## 2023-09-21 NOTE — DIETITIAN INITIAL EVALUATION ADULT - PROBLEM SELECTOR PLAN 2
Pt presenting w/ chills, met sepsis criteria on admission with fever, tachycardia, and leukocytosis  - RVP+ for enterorhinovirus  - s/p 1x vancomycin and cefepime in the ED  - c/w cefepime 500mg (dose for eGFR)  - Currently requiring 2L NC, wean as tolerated  - Supportive care

## 2023-09-21 NOTE — DIETITIAN NUTRITION RISK NOTIFICATION - TREATMENT: THE FOLLOWING DIET HAS BEEN RECOMMENDED
Diet, Renal Restrictions:   For patients receiving Renal Replacement - No Protein Restr, No Conc K, No Conc Phos, Low Sodium  Consistent Carbohydrate {Evening Snack} (CSTCHOSN)  Supplement Feeding Modality:  Oral  Nepro Cans or Servings Per Day:  2       Frequency:  Daily (09-21-23 @ 13:44) [Pending Verification By Attending]  Diet, Renal Restrictions:   For patients receiving Renal Replacement - No Protein Restr, No Conc K, No Conc Phos, Low Sodium  Consistent Carbohydrate {Evening Snack} (CSTCHOSN)  DASH/TLC {Sodium & Cholesterol Restricted} (DASH) (09-20-23 @ 15:32) [Active]  Diet, NPO after Midnight:      NPO Start Date: 20-Sep-2023,   NPO Start Time: 23:59 (09-20-23 @ 15:31) [Active]

## 2023-09-21 NOTE — DIETITIAN INITIAL EVALUATION ADULT - LITERATURE/VIDEOS GIVEN
1) Carbohydrate Counting for Diabetes  2) Plate Method for Diabetes  3) Chronic Kidney Disease Stage 3-5 Nutrition Therapy

## 2023-09-21 NOTE — DIETITIAN INITIAL EVALUATION ADULT - NSFNSADHERENCEPTAFT_GEN_A_CORE
Patient with hx T2DM. Reports being compliant with insulin schedule but has forgotten doses after becoming sick. Checks blood glucose levels everyday using freestyle moshe continuous glucose monitor. A1C 10.4% on 9/6/23 to 9.6% on 9/20/23 consistent with pt's reported decrease in PO intake, indicates poor glycemic control PTA.

## 2023-09-21 NOTE — PROGRESS NOTE ADULT - PROBLEM SELECTOR PLAN 8
DVT PPx: heparin subq  Diet: DASH/Consistent Carb Patient w/ a history of sarcoidosis, takes prednisone 10mg qd at home.  - c/w prednisone 10mg qd

## 2023-09-21 NOTE — PROGRESS NOTE ADULT - SUBJECTIVE AND OBJECTIVE BOX
PROGRESS NOTE:   Patient is a 53y old  Female who presents with a chief complaint of Chills (20 Sep 2023 15:09)    SUBJECTIVE / OVERNIGHT EVENTS:  No acute events overnight.      MEDICATIONS  (STANDING):  amLODIPine   Tablet 10 milliGRAM(s) Oral daily  atorvastatin 80 milliGRAM(s) Oral at bedtime  calcium acetate 667 milliGRAM(s) Oral three times a day with meals  cefepime   IVPB 500 milliGRAM(s) IV Intermittent every 24 hours  dextrose 5%. 1000 milliLiter(s) (100 mL/Hr) IV Continuous <Continuous>  dextrose 5%. 1000 milliLiter(s) (50 mL/Hr) IV Continuous <Continuous>  dextrose 50% Injectable 12.5 Gram(s) IV Push once  dextrose 50% Injectable 25 Gram(s) IV Push once  dextrose 50% Injectable 25 Gram(s) IV Push once  famotidine    Tablet 20 milliGRAM(s) Oral daily  gabapentin 100 milliGRAM(s) Oral three times a day  glucagon  Injectable 1 milliGRAM(s) IntraMuscular once  hydrALAZINE 100 milliGRAM(s) Oral three times a day  influenza   Vaccine 0.5 milliLiter(s) IntraMuscular once  insulin glargine Injectable (LANTUS) 14 Unit(s) SubCutaneous at bedtime  insulin lispro (ADMELOG) corrective regimen sliding scale   SubCutaneous every 6 hours  metoprolol succinate ER 25 milliGRAM(s) Oral daily  predniSONE   Tablet 10 milliGRAM(s) Oral daily  sodium bicarbonate 650 milliGRAM(s) Oral three times a day    MEDICATIONS  (PRN):  dextrose Oral Gel 15 Gram(s) Oral once PRN Blood Glucose LESS THAN 70 milliGRAM(s)/deciliter      CAPILLARY BLOOD GLUCOSE  POCT Blood Glucose.: 135 mg/dL (21 Sep 2023 05:32)  POCT Blood Glucose.: 180 mg/dL (20 Sep 2023 21:23)  POCT Blood Glucose.: 167 mg/dL (20 Sep 2023 17:36)      PHYSICAL EXAM:  Vital Signs Last 24 Hrs  T(C): 37.3 (21 Sep 2023 04:50), Max: 37.7 (20 Sep 2023 19:50)  T(F): 99.2 (21 Sep 2023 04:50), Max: 99.8 (20 Sep 2023 19:50)  HR: 103 (21 Sep 2023 04:50) (78 - 103)  BP: 147/82 (21 Sep 2023 04:50) (114/63 - 167/88)  BP(mean): 78 (20 Sep 2023 12:08) (78 - 78)  RR: 18 (21 Sep 2023 04:50) (14 - 19)  SpO2: 97% (21 Sep 2023 04:50) (97% - 100%)  Parameters below as of 21 Sep 2023 04:50  Patient On (Oxygen Delivery Method): room air    CONSTITUTIONAL: NAD, well-developed, tired but able to answer questions  HEENT: PERRLA, EOMI, moist mucous membranes.  NECK: Supple. No JVD.  RESPIRATORY: Normal respiratory effort. Bibasilar crackles.  CARDIOVASCULAR: Regular rate and rhythm with normal S1 and S2. No murmurs.  ABDOMEN: Soft, non-tender, non-distended. Normoactive bowel sounds, no rebound/guarding; No hepatosplenomegaly  EXTREMITIES: 1+ BLE pitting edema. 2+ peripheral pulses. No clubbing, cyanosis.  MUSCULOSKELETAL: +TTP to lumbar spine (~L2-L4). No joint swelling or tenderness to palpation  SKIN: No rashes or lesions  NEUROLOGIC: A&Ox3. No focal deficits.  PSYCH: Affect appropriate    LABS:                        8.3    9.30  )-----------( 162      ( 21 Sep 2023 05:13 )             24.4     09-21  139  |  93<L>  |  133<H>  ----------------------------<  104<H>  5.2   |  20<L>  |  11.42<H>    Ca    8.0<L>      21 Sep 2023 05:15  Phos  10.6     09-21  Mg     3.1     09-21    TPro  6.7  /  Alb  3.3  /  TBili  0.3  /  DBili  x   /  AST  11  /  ALT  9<L>  /  AlkPhos  43  09-21    Urinalysis Basic - ( 21 Sep 2023 05:15 )  Color: x / Appearance: x / SG: x / pH: x  Gluc: 104 mg/dL / Ketone: x  / Bili: x / Urobili: x   Blood: x / Protein: x / Nitrite: x   Leuk Esterase: x / RBC: x / WBC x   Sq Epi: x / Non Sq Epi: x / Bacteria: x      Culture - Blood (collected 19 Sep 2023 23:30)  Source: .Blood Blood-Peripheral  Preliminary Report (21 Sep 2023 04:01):    No growth at 24 hours    Culture - Blood (collected 19 Sep 2023 21:45)  Source: .Blood Blood-Peripheral  Preliminary Report (21 Sep 2023 04:01):    No growth at 24 hours        RADIOLOGY & ADDITIONAL TESTS:  Results Reviewed:   Imaging Personally Reviewed:  Electrocardiogram Personally Reviewed:    COORDINATION OF CARE:  Care Discussed with Consultants/Other Providers [Y/N]:  Prior or Outpatient Records Reviewed [Y/N]: PROGRESS NOTE:   Patient is a 53y old  Female who presents with a chief complaint of Chills (20 Sep 2023 15:09)    SUBJECTIVE / OVERNIGHT EVENTS:  No acute events overnight. Patient states that she did not sleep well but feels better overall than yesterday. Still endorsing generalized fatigue but denies any chills. States that her back pain has improved. Patient denies any fever, chills, SOB, chest pain, abdominal pain, nausea/vomiting, diarrhea, or urinary symptoms.      MEDICATIONS  (STANDING):  amLODIPine   Tablet 10 milliGRAM(s) Oral daily  atorvastatin 80 milliGRAM(s) Oral at bedtime  calcium acetate 667 milliGRAM(s) Oral three times a day with meals  cefepime   IVPB 500 milliGRAM(s) IV Intermittent every 24 hours  dextrose 5%. 1000 milliLiter(s) (100 mL/Hr) IV Continuous <Continuous>  dextrose 5%. 1000 milliLiter(s) (50 mL/Hr) IV Continuous <Continuous>  dextrose 50% Injectable 12.5 Gram(s) IV Push once  dextrose 50% Injectable 25 Gram(s) IV Push once  dextrose 50% Injectable 25 Gram(s) IV Push once  famotidine    Tablet 20 milliGRAM(s) Oral daily  gabapentin 100 milliGRAM(s) Oral three times a day  glucagon  Injectable 1 milliGRAM(s) IntraMuscular once  hydrALAZINE 100 milliGRAM(s) Oral three times a day  influenza   Vaccine 0.5 milliLiter(s) IntraMuscular once  insulin glargine Injectable (LANTUS) 14 Unit(s) SubCutaneous at bedtime  insulin lispro (ADMELOG) corrective regimen sliding scale   SubCutaneous every 6 hours  metoprolol succinate ER 25 milliGRAM(s) Oral daily  predniSONE   Tablet 10 milliGRAM(s) Oral daily  sodium bicarbonate 650 milliGRAM(s) Oral three times a day    MEDICATIONS  (PRN):  dextrose Oral Gel 15 Gram(s) Oral once PRN Blood Glucose LESS THAN 70 milliGRAM(s)/deciliter      CAPILLARY BLOOD GLUCOSE  POCT Blood Glucose.: 135 mg/dL (21 Sep 2023 05:32)  POCT Blood Glucose.: 180 mg/dL (20 Sep 2023 21:23)  POCT Blood Glucose.: 167 mg/dL (20 Sep 2023 17:36)      PHYSICAL EXAM:  Vital Signs Last 24 Hrs  T(C): 37.3 (21 Sep 2023 04:50), Max: 37.7 (20 Sep 2023 19:50)  T(F): 99.2 (21 Sep 2023 04:50), Max: 99.8 (20 Sep 2023 19:50)  HR: 103 (21 Sep 2023 04:50) (78 - 103)  BP: 147/82 (21 Sep 2023 04:50) (114/63 - 167/88)  BP(mean): 78 (20 Sep 2023 12:08) (78 - 78)  RR: 18 (21 Sep 2023 04:50) (14 - 19)  SpO2: 97% (21 Sep 2023 04:50) (97% - 100%)  Parameters below as of 21 Sep 2023 04:50  Patient On (Oxygen Delivery Method): room air    CONSTITUTIONAL: NAD, well-developed, tired but able to answer questions  HEENT: PERRLA, EOMI, moist mucous membranes.  NECK: Supple. No JVD.  RESPIRATORY: Normal respiratory effort. Bibasilar crackles.  CARDIOVASCULAR: Regular rate and rhythm with normal S1 and S2. No murmurs.  ABDOMEN: Soft, non-tender, non-distended. Normoactive bowel sounds, no rebound/guarding; No hepatosplenomegaly  EXTREMITIES: 1+ BLE pitting edema. 2+ peripheral pulses. No clubbing, cyanosis.  MUSCULOSKELETAL: +TTP to lumbar spine (~L2-L4). No joint swelling or tenderness to palpation  SKIN: No rashes or lesions  NEUROLOGIC: A&Ox3. No focal deficits.  PSYCH: Affect appropriate    LABS:                        8.3    9.30  )-----------( 162      ( 21 Sep 2023 05:13 )             24.4     09-21  139  |  93<L>  |  133<H>  ----------------------------<  104<H>  5.2   |  20<L>  |  11.42<H>    Ca    8.0<L>      21 Sep 2023 05:15  Phos  10.6     09-21  Mg     3.1     09-21    TPro  6.7  /  Alb  3.3  /  TBili  0.3  /  DBili  x   /  AST  11  /  ALT  9<L>  /  AlkPhos  43  09-21    Urinalysis Basic - ( 21 Sep 2023 05:15 )  Color: x / Appearance: x / SG: x / pH: x  Gluc: 104 mg/dL / Ketone: x  / Bili: x / Urobili: x   Blood: x / Protein: x / Nitrite: x   Leuk Esterase: x / RBC: x / WBC x   Sq Epi: x / Non Sq Epi: x / Bacteria: x      Culture - Blood (collected 19 Sep 2023 23:30)  Source: .Blood Blood-Peripheral  Preliminary Report (21 Sep 2023 04:01):    No growth at 24 hours    Culture - Blood (collected 19 Sep 2023 21:45)  Source: .Blood Blood-Peripheral  Preliminary Report (21 Sep 2023 04:01):    No growth at 24 hours        RADIOLOGY & ADDITIONAL TESTS:  Results Reviewed:   Imaging Personally Reviewed:  Electrocardiogram Personally Reviewed:    COORDINATION OF CARE:  Care Discussed with Consultants/Other Providers [Y/N]:  Prior or Outpatient Records Reviewed [Y/N]:

## 2023-09-22 DIAGNOSIS — E11.65 TYPE 2 DIABETES MELLITUS WITH HYPERGLYCEMIA: ICD-10-CM

## 2023-09-22 DIAGNOSIS — E78.5 HYPERLIPIDEMIA, UNSPECIFIED: ICD-10-CM

## 2023-09-22 DIAGNOSIS — E55.9 VITAMIN D DEFICIENCY, UNSPECIFIED: ICD-10-CM

## 2023-09-22 DIAGNOSIS — E83.51 HYPOCALCEMIA: ICD-10-CM

## 2023-09-22 DIAGNOSIS — E20.9 HYPOPARATHYROIDISM, UNSPECIFIED: ICD-10-CM

## 2023-09-22 DIAGNOSIS — I10 ESSENTIAL (PRIMARY) HYPERTENSION: ICD-10-CM

## 2023-09-22 LAB
ALBUMIN SERPL ELPH-MCNC: 3.1 G/DL — LOW (ref 3.3–5)
ALP SERPL-CCNC: 40 U/L — SIGNIFICANT CHANGE UP (ref 40–120)
ALT FLD-CCNC: 8 U/L — LOW (ref 10–45)
ANION GAP SERPL CALC-SCNC: 16 MMOL/L — SIGNIFICANT CHANGE UP (ref 5–17)
AST SERPL-CCNC: 7 U/L — LOW (ref 10–40)
BASOPHILS # BLD AUTO: 0.01 K/UL — SIGNIFICANT CHANGE UP (ref 0–0.2)
BASOPHILS NFR BLD AUTO: 0.2 % — SIGNIFICANT CHANGE UP (ref 0–2)
BILIRUB SERPL-MCNC: 0.2 MG/DL — SIGNIFICANT CHANGE UP (ref 0.2–1.2)
BUN SERPL-MCNC: 73 MG/DL — HIGH (ref 7–23)
CALCIUM SERPL-MCNC: 8.4 MG/DL — SIGNIFICANT CHANGE UP (ref 8.4–10.5)
CHLORIDE SERPL-SCNC: 98 MMOL/L — SIGNIFICANT CHANGE UP (ref 96–108)
CO2 SERPL-SCNC: 26 MMOL/L — SIGNIFICANT CHANGE UP (ref 22–31)
CREAT SERPL-MCNC: 6.91 MG/DL — HIGH (ref 0.5–1.3)
EGFR: 7 ML/MIN/1.73M2 — LOW
EOSINOPHIL # BLD AUTO: 0.24 K/UL — SIGNIFICANT CHANGE UP (ref 0–0.5)
EOSINOPHIL NFR BLD AUTO: 4 % — SIGNIFICANT CHANGE UP (ref 0–6)
GLUCOSE BLDC GLUCOMTR-MCNC: 116 MG/DL — HIGH (ref 70–99)
GLUCOSE BLDC GLUCOMTR-MCNC: 179 MG/DL — HIGH (ref 70–99)
GLUCOSE BLDC GLUCOMTR-MCNC: 186 MG/DL — HIGH (ref 70–99)
GLUCOSE BLDC GLUCOMTR-MCNC: 211 MG/DL — HIGH (ref 70–99)
GLUCOSE SERPL-MCNC: 112 MG/DL — HIGH (ref 70–99)
HCT VFR BLD CALC: 22.9 % — LOW (ref 34.5–45)
HGB BLD-MCNC: 7.7 G/DL — LOW (ref 11.5–15.5)
IMM GRANULOCYTES NFR BLD AUTO: 0.5 % — SIGNIFICANT CHANGE UP (ref 0–0.9)
LYMPHOCYTES # BLD AUTO: 1.27 K/UL — SIGNIFICANT CHANGE UP (ref 1–3.3)
LYMPHOCYTES # BLD AUTO: 21.2 % — SIGNIFICANT CHANGE UP (ref 13–44)
MAGNESIUM SERPL-MCNC: 2.6 MG/DL — SIGNIFICANT CHANGE UP (ref 1.6–2.6)
MCHC RBC-ENTMCNC: 28.6 PG — SIGNIFICANT CHANGE UP (ref 27–34)
MCHC RBC-ENTMCNC: 33.6 GM/DL — SIGNIFICANT CHANGE UP (ref 32–36)
MCV RBC AUTO: 85.1 FL — SIGNIFICANT CHANGE UP (ref 80–100)
MONOCYTES # BLD AUTO: 0.62 K/UL — SIGNIFICANT CHANGE UP (ref 0–0.9)
MONOCYTES NFR BLD AUTO: 10.3 % — SIGNIFICANT CHANGE UP (ref 2–14)
MRSA PCR RESULT.: SIGNIFICANT CHANGE UP
NEUTROPHILS # BLD AUTO: 3.83 K/UL — SIGNIFICANT CHANGE UP (ref 1.8–7.4)
NEUTROPHILS NFR BLD AUTO: 63.8 % — SIGNIFICANT CHANGE UP (ref 43–77)
NRBC # BLD: 0 /100 WBCS — SIGNIFICANT CHANGE UP (ref 0–0)
PHOSPHATE SERPL-MCNC: 6.7 MG/DL — HIGH (ref 2.5–4.5)
PLATELET # BLD AUTO: 176 K/UL — SIGNIFICANT CHANGE UP (ref 150–400)
POTASSIUM SERPL-MCNC: 4 MMOL/L — SIGNIFICANT CHANGE UP (ref 3.5–5.3)
POTASSIUM SERPL-SCNC: 4 MMOL/L — SIGNIFICANT CHANGE UP (ref 3.5–5.3)
PROT SERPL-MCNC: 6.2 G/DL — SIGNIFICANT CHANGE UP (ref 6–8.3)
RBC # BLD: 2.69 M/UL — LOW (ref 3.8–5.2)
RBC # FLD: 12.4 % — SIGNIFICANT CHANGE UP (ref 10.3–14.5)
S AUREUS DNA NOSE QL NAA+PROBE: SIGNIFICANT CHANGE UP
SODIUM SERPL-SCNC: 140 MMOL/L — SIGNIFICANT CHANGE UP (ref 135–145)
WBC # BLD: 6 K/UL — SIGNIFICANT CHANGE UP (ref 3.8–10.5)
WBC # FLD AUTO: 6 K/UL — SIGNIFICANT CHANGE UP (ref 3.8–10.5)

## 2023-09-22 PROCEDURE — 71045 X-RAY EXAM CHEST 1 VIEW: CPT | Mod: 26

## 2023-09-22 PROCEDURE — 99233 SBSQ HOSP IP/OBS HIGH 50: CPT

## 2023-09-22 PROCEDURE — 99222 1ST HOSP IP/OBS MODERATE 55: CPT

## 2023-09-22 PROCEDURE — 99233 SBSQ HOSP IP/OBS HIGH 50: CPT | Mod: GC

## 2023-09-22 RX ORDER — ONDANSETRON 8 MG/1
4 TABLET, FILM COATED ORAL ONCE
Refills: 0 | Status: COMPLETED | OUTPATIENT
Start: 2023-09-22 | End: 2023-09-22

## 2023-09-22 RX ORDER — ERYTHROPOIETIN 10000 [IU]/ML
4000 INJECTION, SOLUTION INTRAVENOUS; SUBCUTANEOUS
Refills: 0 | Status: DISCONTINUED | OUTPATIENT
Start: 2023-09-22 | End: 2023-09-28

## 2023-09-22 RX ORDER — INSULIN LISPRO 100/ML
VIAL (ML) SUBCUTANEOUS
Refills: 0 | Status: DISCONTINUED | OUTPATIENT
Start: 2023-09-22 | End: 2023-09-28

## 2023-09-22 RX ORDER — LANOLIN ALCOHOL/MO/W.PET/CERES
3 CREAM (GRAM) TOPICAL ONCE
Refills: 0 | Status: COMPLETED | OUTPATIENT
Start: 2023-09-22 | End: 2023-09-22

## 2023-09-22 RX ORDER — LANOLIN ALCOHOL/MO/W.PET/CERES
3 CREAM (GRAM) TOPICAL AT BEDTIME
Refills: 0 | Status: DISCONTINUED | OUTPATIENT
Start: 2023-09-22 | End: 2023-09-22

## 2023-09-22 RX ORDER — INSULIN LISPRO 100/ML
VIAL (ML) SUBCUTANEOUS AT BEDTIME
Refills: 0 | Status: DISCONTINUED | OUTPATIENT
Start: 2023-09-22 | End: 2023-09-25

## 2023-09-22 RX ORDER — ERGOCALCIFEROL 1.25 MG/1
50000 CAPSULE ORAL
Refills: 0 | Status: DISCONTINUED | OUTPATIENT
Start: 2023-09-23 | End: 2023-09-28

## 2023-09-22 RX ORDER — ACETAMINOPHEN 500 MG
1000 TABLET ORAL ONCE
Refills: 0 | Status: COMPLETED | OUTPATIENT
Start: 2023-09-22 | End: 2023-09-22

## 2023-09-22 RX ADMIN — GABAPENTIN 100 MILLIGRAM(S): 400 CAPSULE ORAL at 15:29

## 2023-09-22 RX ADMIN — Medication 25 MILLIGRAM(S): at 09:04

## 2023-09-22 RX ADMIN — Medication 650 MILLIGRAM(S): at 06:43

## 2023-09-22 RX ADMIN — Medication 10 MILLIGRAM(S): at 06:18

## 2023-09-22 RX ADMIN — GABAPENTIN 100 MILLIGRAM(S): 400 CAPSULE ORAL at 06:18

## 2023-09-22 RX ADMIN — Medication 650 MILLIGRAM(S): at 23:55

## 2023-09-22 RX ADMIN — Medication 100 MILLIGRAM(S): at 06:17

## 2023-09-22 RX ADMIN — Medication 650 MILLIGRAM(S): at 15:29

## 2023-09-22 RX ADMIN — Medication 667 MILLIGRAM(S): at 09:04

## 2023-09-22 RX ADMIN — Medication 650 MILLIGRAM(S): at 23:23

## 2023-09-22 RX ADMIN — Medication 667 MILLIGRAM(S): at 17:57

## 2023-09-22 RX ADMIN — ONDANSETRON 4 MILLIGRAM(S): 8 TABLET, FILM COATED ORAL at 09:04

## 2023-09-22 RX ADMIN — Medication 3 MILLIGRAM(S): at 02:29

## 2023-09-22 RX ADMIN — CHLORHEXIDINE GLUCONATE 1 APPLICATION(S): 213 SOLUTION TOPICAL at 12:04

## 2023-09-22 RX ADMIN — FAMOTIDINE 20 MILLIGRAM(S): 10 INJECTION INTRAVENOUS at 09:04

## 2023-09-22 RX ADMIN — CEFEPIME 100 MILLIGRAM(S): 1 INJECTION, POWDER, FOR SOLUTION INTRAMUSCULAR; INTRAVENOUS at 06:19

## 2023-09-22 RX ADMIN — Medication 650 MILLIGRAM(S): at 06:18

## 2023-09-22 RX ADMIN — Medication 1000 MILLIGRAM(S): at 02:30

## 2023-09-22 RX ADMIN — Medication 400 MILLIGRAM(S): at 02:08

## 2023-09-22 RX ADMIN — GABAPENTIN 100 MILLIGRAM(S): 400 CAPSULE ORAL at 22:24

## 2023-09-22 RX ADMIN — IRON SUCROSE 110 MILLIGRAM(S): 20 INJECTION, SOLUTION INTRAVENOUS at 15:30

## 2023-09-22 RX ADMIN — Medication 100 MILLIGRAM(S): at 22:24

## 2023-09-22 RX ADMIN — INSULIN GLARGINE 14 UNIT(S): 100 INJECTION, SOLUTION SUBCUTANEOUS at 22:25

## 2023-09-22 RX ADMIN — ATORVASTATIN CALCIUM 80 MILLIGRAM(S): 80 TABLET, FILM COATED ORAL at 22:24

## 2023-09-22 RX ADMIN — Medication 100 MILLIGRAM(S): at 15:29

## 2023-09-22 RX ADMIN — Medication 2: at 00:54

## 2023-09-22 NOTE — PROGRESS NOTE ADULT - SUBJECTIVE AND OBJECTIVE BOX
[facility] DIVISION OF KIDNEY DISEASES AND HYPERTENSION -- HEMODIALYSIS NOTE  --------------------------------------------------------------------------------  Chief Complaint: ESRD/Ongoing hemodialysis requirement    24 hour events/subjective:  seen on dialysis  tolerating well  has dolores  received hd #1 yesterday   no complaints except she doesnt like dialysis        PAST HISTORY  --------------------------------------------------------------------------------  No significant changes to PMH, PSH, FHx, SHx, unless otherwise noted    ALLERGIES & MEDICATIONS  --------------------------------------------------------------------------------  Allergies    No Known Allergies    Intolerances      Standing Inpatient Medications  amLODIPine   Tablet 10 milliGRAM(s) Oral daily  atorvastatin 80 milliGRAM(s) Oral at bedtime  calcium acetate 667 milliGRAM(s) Oral three times a day with meals  cefepime   IVPB 500 milliGRAM(s) IV Intermittent every 24 hours  chlorhexidine 4% Liquid 1 Application(s) Topical <User Schedule>  dextrose 5%. 1000 milliLiter(s) IV Continuous <Continuous>  dextrose 5%. 1000 milliLiter(s) IV Continuous <Continuous>  dextrose 50% Injectable 12.5 Gram(s) IV Push once  dextrose 50% Injectable 25 Gram(s) IV Push once  dextrose 50% Injectable 25 Gram(s) IV Push once  famotidine    Tablet 20 milliGRAM(s) Oral daily  gabapentin 100 milliGRAM(s) Oral three times a day  glucagon  Injectable 1 milliGRAM(s) IntraMuscular once  hydrALAZINE 100 milliGRAM(s) Oral three times a day  influenza   Vaccine 0.5 milliLiter(s) IntraMuscular once  insulin glargine Injectable (LANTUS) 14 Unit(s) SubCutaneous at bedtime  insulin lispro (ADMELOG) corrective regimen sliding scale   SubCutaneous every 6 hours  iron sucrose IVPB 200 milliGRAM(s) IV Intermittent every 24 hours  metoprolol succinate ER 25 milliGRAM(s) Oral daily  predniSONE   Tablet 10 milliGRAM(s) Oral daily  sodium bicarbonate 650 milliGRAM(s) Oral three times a day    PRN Inpatient Medications  acetaminophen     Tablet .. 650 milliGRAM(s) Oral every 6 hours PRN  dextrose Oral Gel 15 Gram(s) Oral once PRN  sodium chloride 0.9% lock flush 10 milliLiter(s) IV Push every 1 hour PRN      REVIEW OF SYSTEMS  --------------------------------------------------------------------------------  Gen: No weight changes, fatigue, fevers/chills, weakness  Skin: No rashes  Head/Eyes/Ears/Mouth: No headache; Normal hearing; Normal vision w/o blurriness; No sinus pain/discomfort, sore throat  Respiratory: No dyspnea, cough, wheezing, hemoptysis  CV: No chest pain, PND, orthopnea  GI: No abdominal pain, diarrhea, constipation, nausea, vomiting, melena, hematochezia  : No increased frequency, dysuria, hematuria, nocturia  MSK: No joint pain/swelling; no back pain; no edema  Neuro: No dizziness/lightheadedness, weakness, seizures, numbness, tingling  Heme: No easy bruising or bleeding  Endo: No heat/cold intolerance  Psych: No significant nervousness, anxiety, stress, depression    All other systems were reviewed and are negative, except as noted.    VITALS/PHYSICAL EXAM  --------------------------------------------------------------------------------  T(C): 36.4 (09-22-23 @ 09:35), Max: 37.1 (09-22-23 @ 00:54)  HR: 97 (09-22-23 @ 09:35) (78 - 101)  BP: 149/82 (09-22-23 @ 09:35) (136/74 - 185/92)  RR: 18 (09-22-23 @ 09:35) (15 - 18)  SpO2: 96% (09-22-23 @ 09:35) (95% - 100%)  Wt(kg): --  Drug Dosing Weight  Height (cm): 157.5 (07 Sep 2023 05:36)  Weight (kg): 68 (21 Sep 2023 14:00)  BMI (kg/m2): 27.4 (21 Sep 2023 14:00)  BSA (m2): 1.69 (21 Sep 2023 14:00)    Weight (kg): 68 (09-21-23 @ 14:00)      09-21-23 @ 07:01  -  09-22-23 @ 07:00  --------------------------------------------------------  IN: 200 mL / OUT: 1900 mL / NET: -1700 mL      Physical Exam:  	Gen: NAD,    	HEENT: no jvp  	Pulm: CTA B/L  	CV: RRR, S1S2; no rub  	Back:  no sacral edema  	Abd: +BS, soft,    	: No suprapubic tenderness  	LE: no edema  	Neuro: awake  	Psych: alert  	Skin: Warm,   	Vascular access: right dolores    LABS/STUDIES  --------------------------------------------------------------------------------              7.7    6.00  >-----------<  176      [09-22-23 @ 07:17]              22.9     140  |  98  |  73  ----------------------------<  112      [09-22-23 @ 07:17]  4.0   |  26  |  6.91        Ca     8.4     [09-22-23 @ 07:17]      iCa    0.87     [09-21 @ 05:13]      Mg     2.6     [09-22-23 @ 07:17]      Phos  6.7     [09-22-23 @ 07:17]    TPro  6.2  /  Alb  3.1  /  TBili  0.2  /  DBili  x   /  AST  7   /  ALT  8   /  AlkPhos  40  [09-22-23 @ 07:17]              [09-21-23 @ 05:15]    Iron 14, TIBC 191, %sat 7      [09-21-23 @ 05:17]  Ferritin 1      [09-21-23 @ 05:15]  PTH -- (Ca 7.3)      [09-21-23 @ 05:15]   7  PTH -- (Ca 8.1)      [02-23-23 @ 06:50]   218  PTH -- (Ca 8.0)      [02-22-23 @ 17:33]   233  PTH -- (Ca 8.1)      [01-31-23 @ 12:34]   98  Vitamin D (25OH) 7.5      [09-07-23 @ 06:50]  HbA1c 11.2      [08-09-19 @ 21:20]  TSH 1.71      [01-28-23 @ 07:29]  Lipid: chol 254, , , LDL --      [02-21-23 @ 07:58]    HBsAg Nonreact      [09-21-23 @ 05:15]      RADIOLOGY:  --------------------------------------------------------------------------------------

## 2023-09-22 NOTE — PROGRESS NOTE ADULT - ATTENDING COMMENTS
-Repeat CXR today clear. Cultures negative. DC cefepime. Observe off abx.   -Renal appreciated. HD per renal.   -Hypoparathyroidism and severe vitamin D deficiency. -F/u endo recs.   -Supportive care.

## 2023-09-22 NOTE — CONSULT NOTE ADULT - ATTENDING COMMENTS
Agree with assessment and plan as above by Dr. Balnton. Reviewed all pertinent labs, glucose values, and imaging studies. Modifications made as indicated above. Repeat PTH level to assess for hypoparathyroidism and possible need for calcitriol. In the meantime start ergocalciferol weekly for severely low vitamin D level. Monitor glucose with Lantus 14 units daily for now with poor po intake. Can increase as needed.     Jayce Mars D.O  [phone]
pt seen and examined this morning in ER by me     Progressed CKD Stage V  was at [facility] early this month for dialysis initiation and she left ama  didn't feel well these past three days- fever, body aches, weakness so presented to [facility]  #viral syndrome now +rhinovirus; supportive management as needed  # ckd stage V  will need to initiate hd this admission  please contact IR for dolores  #check [employer] ag  # anemia in ckd  check ferritin, iron studies  #hypocalcemia  check albumin  check pth  #hyperphosphatemia- start phoslo 667mg po tid with meals  #met acidosis  start sodium bicarb tabs 650mg po tid
(0) independent

## 2023-09-22 NOTE — PROGRESS NOTE ADULT - PROBLEM SELECTOR PLAN 3
Pt with a history of anemia in the setting of ESRD. Baseline hemoglobin ~7-8.   - Hb on admission 6.7, received 1u pRBCs  - Iron low, ferritin low  - Will start oral ferrous sulfate

## 2023-09-22 NOTE — MEDICAL STUDENT PROGRESS NOTE(EDUCATION) - PLAN 2
- Sepsis workup with blood culture and urine culture pending  - CXR complete showing bilateral hazy opacities  - Patient received one time dose of cefepime and vancomycin  - c/w cefepime
- Sepsis workup with blood culture and urine culture pending  - CXR complete showing bilateral hazy opacities  - Patient received one time dose of cefepime and vancomycin  - c/w cefepime pending repeat CXR

## 2023-09-22 NOTE — MEDICAL STUDENT PROGRESS NOTE(EDUCATION) - ASSESSMENT
54 yo F with PMHx of CKD not on dialysis, poorly controlled DM on insulin and Lantus, HTN, HLD, Sarcoidosis, GERD, and BASIM who initially presented to the [facility] ED for initiation of dialysis on 09/06 but signed out AMA on 09/07 and represented yesterday 9/19 for worsening chills since Friday, 10/10 back pain and excessive fatigue. She has no sick contacts, no recent travel. No SOB. The daughter reports worsening leg swelling. Daughter denies any changes to her mental status. The patient was febrile to 102F, with elevated WBC count to 14.88, and tachycardic to 102bpm meeting the criteria for sepsis, currently with an undetermined source. 
54 yo F with PMHx of CKD not on dialysis, poorly controlled DM on insulin and Lantus, HTN, HLD, Sarcoidosis, GERD, and BASIM who initially presented to the [facility] ED for initiation of dialysis on 09/06 but signed out AMA on 09/07 and represented yesterday 9/19 for worsening chills since Friday, 10/10 back pain and excessive fatigue. She has no sick contacts, no recent travel. No SOB. The daughter reports worsening leg swelling. Daughter denies any changes to her mental status. The patient was febrile to 102F, with elevated WBC count to 14.88, and tachycardic to 102bpm meeting the criteria for sepsis, currently with an undetermined source.

## 2023-09-22 NOTE — MEDICAL STUDENT PROGRESS NOTE(EDUCATION) - PLAN 5
- Continue hydralazine, amlodipine, and metoprolol
- Continue hydralazine, amlodipine, and metoprolol

## 2023-09-22 NOTE — MEDICAL STUDENT PROGRESS NOTE(EDUCATION) - PLAN 6
- Bladder scans revealed retention, De Los Santos catheter in
- Bladder scans revealed retention, De Los Santos catheter in

## 2023-09-22 NOTE — PROGRESS NOTE ADULT - PROBLEM SELECTOR PLAN 1
Pt w/ a history of ESRD, follows with Dr. Jose M Garibay outpt. Prior hospitalization at [facility] 9/6-9/7 for HD initiation, but left AMA. , Cr 10.92 on admission now.  - Pt states that she is unwilling to have the Shiley placed without being put to sleep for it  - Nephrology consulted, recommend initiation of HD on this admission w/ Shiley placement by IR  - Will check HBV surface antigen, PTH (hypocalcemia)  - c/w phoslo 667mg PO TID premeals for hyperphosphatemia  - c/w sodium bicabonate 650mg PO TID  - IR consulted for R Shiley placement, plan for 9/21 with anesthesia Pt w/ a history of ESRD, follows with Dr. Jose M Garibay outpt. Prior hospitalization at [facility] 9/6-9/7 for HD initiation, but left AMA. , Cr 10.92 on admission now.  - Pt states that she is unwilling to have the Shiley placed without being put to sleep for it  - Nephrology consulted, recommend initiation of HD on this admission w/ Shiley placement by IR  - Will check HBV surface antigen, PTH (hypocalcemia)  - c/w phoslo 667mg PO TID premeals for hyperphosphatemia  - c/w sodium bicabonate 650mg PO TID  - R IJ Betty placed 9/21 with first session of HD

## 2023-09-22 NOTE — PROGRESS NOTE ADULT - ATTENDING COMMENTS
Progressed CKD Stage V  was at [facility] early this month for dialysis initiation and she left ama  didn't feel well these past three days- fever, body aches, weakness so presented to [facility]  #viral syndrome now +rhinovirus; supportive management as needed  # ckd stage V  saeyinka placed 9/21  hd # 1 9/21  hd #2 today, tolerating well  HD # 3 monday 9/25  #access- [facility]ores; needs permacath/avf  #needs outpt hd setup by SW  # anemia in ckd  +iron deficiency- needs venofer 200mg iv x 5 days  start joseph  #hypocalcemia  pth low- primary hypopth? consider endo   #hyperphosphatemia-  phoslo 667mg po tid with meals; monitor phos trends  #met acidosis  DC sodium bicarb tabs   bicarb 26 today  monitor bicarb  #hyperkalemia  k stable today  low k diet  monitor k trend

## 2023-09-22 NOTE — CONSULT NOTE ADULT - SUBJECTIVE AND OBJECTIVE BOX
HPI:  Patient is a 54 y/o F w/ a PMHx of ESRD not on HD, poorly controlled Type 2 DM on insulin, HTN, and sarcoidosis, with a recent admission at [facility] (9/6-9/7) for HD initiation but left AMA before HD was started, who is presenting with chills and lower back pain. Patient states that she has had worsening chills since Friday and this morning woke up with 10/10 back pain. Patient's daughter states that the patient has been more tired in the past week since her prior AMA from [facility]. Denies any shortness of breath, cough, chest pain. Denies any sick contacts or recent travel. Patient also endorses worsening leg swelling since leaving [facility]. Patient denies any SOB, chest pain, abdominal pain, nausea/vomiting, diarrhea, or urinary symptoms.    Patient previously presented to the [facility] ED for initiation of dialysis on 09/06/2023 with creatinine of 9 and elevated potassium, also with fatigue and loss of appetite. At that time she had a fingerstick glucose in the 660s stating that she did not take her insulin or Lantus for > 2 days 2/2 neuropathic pain. She was found to have anion gap metabolic acidosis (bicarb 18, VBG pH 7.33) and hyperkalemia. MICU at that time deemed that she was not in need of emergent dialysis. Patient signed out AMA on 09/07/2023 due to frustrations with delays in the process of getting a Shiley placed for HD.    ED Course:  In the ED, patient was febrile (Tmax 102.9), tachycardic (102), hypertensive (174/76mmHg), requiring 2L NC after desatting to 88% on RA. Labs significant for leukocytosis (WBC 14.78), anemia (Hb 6.7), elevated BUN (144) and creatinine (10.92), hypocalcemia (7.4). UA was wnl. RVP positive for enterorhinovirus. CXR w/ bibasilar hazy opacities.   (20 Sep 2023 13:01)      ENDOCRINE HX:    Diabetes:  History/diagnosis: T2DM dx 10 years ago  Follows with: PCP, but has upcoming appt in November to see ivan Fagan  Last hemoglobin A1c: 9.2%  Home regimen: basaglar 35 units qhs; admelog 10 units TIDAC  Adherence: misses doses occasionally due to hypoglycemia  Complications: + neuropathy, + ESRD  Of note, patient also takes prednisone 10mg daily    Patient denies any personal or family history of calcium disorders. She denies neck surgery, diuretic use, or the use of bisphosphonates or any other medications for low bone density. She does have a hx of autoimmune disease (sarcoidosis). Workup notable for corrected calcium 6.7, low PTH, low vitamin D, and elevated phos i/s/o ESRD. Started on phoslo TID. Patient states she was formerly on a vitamin D supplement but has not taken it in months. She reports paraesthesias in her feet but has a hx of neuropathy. She denies muscle spasms, palpitations, or SOB.      PAST MEDICAL & SURGICAL HISTORY:  Sarcoidosis      Diabetes      Hypertension      Hyperlipidemia      Chronic kidney disease (CKD), stage III (moderate)      Seizure disorder      No significant past surgical history          FAMILY HISTORY:  Family history of diabetes mellitus in first degree relative        Social History:    Outpatient Medications: basaglar 35 units qhs; admelog 10 units TIDAC; prednisone 10mg daily    MEDICATIONS  (STANDING):  amLODIPine   Tablet 10 milliGRAM(s) Oral daily  atorvastatin 80 milliGRAM(s) Oral at bedtime  calcium acetate 667 milliGRAM(s) Oral three times a day with meals  chlorhexidine 4% Liquid 1 Application(s) Topical <User Schedule>  dextrose 5%. 1000 milliLiter(s) (100 mL/Hr) IV Continuous <Continuous>  dextrose 5%. 1000 milliLiter(s) (50 mL/Hr) IV Continuous <Continuous>  dextrose 50% Injectable 12.5 Gram(s) IV Push once  dextrose 50% Injectable 25 Gram(s) IV Push once  dextrose 50% Injectable 25 Gram(s) IV Push once  epoetin suzette-epbx (RETACRIT) Injectable 4000 Unit(s) IV Push <User Schedule>  famotidine    Tablet 20 milliGRAM(s) Oral daily  gabapentin 100 milliGRAM(s) Oral three times a day  glucagon  Injectable 1 milliGRAM(s) IntraMuscular once  hydrALAZINE 100 milliGRAM(s) Oral three times a day  influenza   Vaccine 0.5 milliLiter(s) IntraMuscular once  insulin glargine Injectable (LANTUS) 14 Unit(s) SubCutaneous at bedtime  insulin lispro (ADMELOG) corrective regimen sliding scale   SubCutaneous every 6 hours  iron sucrose IVPB 200 milliGRAM(s) IV Intermittent every 24 hours  metoprolol succinate ER 25 milliGRAM(s) Oral daily  predniSONE   Tablet 10 milliGRAM(s) Oral daily  sodium bicarbonate 650 milliGRAM(s) Oral three times a day    MEDICATIONS  (PRN):  acetaminophen     Tablet .. 650 milliGRAM(s) Oral every 6 hours PRN Mild Pain (1 - 3)  dextrose Oral Gel 15 Gram(s) Oral once PRN Blood Glucose LESS THAN 70 milliGRAM(s)/deciliter  sodium chloride 0.9% lock flush 10 milliLiter(s) IV Push every 1 hour PRN Pre/post blood products, medications, blood draw, and to maintain line patency      Allergies    No Known Allergies    Intolerances      Review of Systems:  Constitutional: No fever  Eyes: No blurry vision  Neuro: No tremors  HEENT: No pain  Cardiovascular: No chest pain, palpitations  Respiratory: No SOB, no cough  GI: No nausea, vomiting, abdominal pain  : No dysuria  Skin: no rash  Psych: no depression  Endocrine: no polyuria, polydipsia  Hem/lymph: no swelling  Osteoporosis: no fractures    ALL OTHER SYSTEMS REVIEWED AND NEGATIVE    PHYSICAL EXAM:  VITALS: T(C): 36.2 (09-22-23 @ 12:08)  T(F): 97.1 (09-22-23 @ 12:08), Max: 98.8 (09-22-23 @ 00:54)  HR: 97 (09-22-23 @ 12:08) (78 - 101)  BP: 156/84 (09-22-23 @ 12:08) (136/74 - 172/73)  RR:  (17 - 18)  SpO2:  (95% - 100%)  Wt(kg): --  GENERAL: NAD, well-groomed, well-developed  EYES: No proptosis, no lid lag, anicteric  HEENT:  Atraumatic, Normocephalic, moist mucous membranes  THYROID: Normal size, no palpable nodules  RESPIRATORY: Normal respiratory effort; no audible wheezing  SKIN: Dry, intact, No rashes or lesions; RIJ dialysis catheter in place  MUSCULOSKELETAL: Full range of motion, normal strength  NEURO: sensation intact, extraocular movements intact, no tremor  PSYCH: Alert and oriented x 3, normal affect, normal mood  CUSHING'S SIGNS: no striae                          7.7    6.00  )-----------( 176      ( 22 Sep 2023 07:17 )             22.9       09-22    140  |  98  |  73<H>  ----------------------------<  112<H>  4.0   |  26  |  6.91<H>    eGFR: 7<L>    Ca    8.4      09-22  Mg     2.6     09-22  Phos  6.7     09-22    TPro  6.2  /  Alb  3.1<L>  /  TBili  0.2  /  DBili  x   /  AST  7<L>  /  ALT  8<L>  /  AlkPhos  40  09-22      A1C with Estimated Average Glucose Result: 9.2 % (09-21-23 @ 05:16)  A1C with Estimated Average Glucose Result: 10.4 % (09-06-23 @ 17:15)  A1C with Estimated Average Glucose Result: 10.6 % (01-27-23 @ 18:12)  A1C with Estimated Average Glucose Result: 10.6 % (01-27-23 @ 06:44)      CAPILLARY BLOOD GLUCOSE      POCT Blood Glucose.: 116 mg/dL (22 Sep 2023 06:00)  POCT Blood Glucose.: 211 mg/dL (22 Sep 2023 00:48)  POCT Blood Glucose.: 179 mg/dL (21 Sep 2023 21:24)  POCT Blood Glucose.: 141 mg/dL (21 Sep 2023 15:58)      Thyroid Function Tests:  acetaminophen     Tablet .. 650 milliGRAM(s) Oral every 6 hours PRN  amLODIPine   Tablet 10 milliGRAM(s) Oral daily  atorvastatin 80 milliGRAM(s) Oral at bedtime  calcium acetate 667 milliGRAM(s) Oral three times a day with meals  chlorhexidine 4% Liquid 1 Application(s) Topical <User Schedule>  dextrose 5%. 1000 milliLiter(s) IV Continuous <Continuous>  dextrose 5%. 1000 milliLiter(s) IV Continuous <Continuous>  dextrose 50% Injectable 12.5 Gram(s) IV Push once  dextrose 50% Injectable 25 Gram(s) IV Push once  dextrose 50% Injectable 25 Gram(s) IV Push once  dextrose Oral Gel 15 Gram(s) Oral once PRN  epoetin suzette-epbx (RETACRIT) Injectable 4000 Unit(s) IV Push <User Schedule>  famotidine    Tablet 20 milliGRAM(s) Oral daily  gabapentin 100 milliGRAM(s) Oral three times a day  glucagon  Injectable 1 milliGRAM(s) IntraMuscular once  hydrALAZINE 100 milliGRAM(s) Oral three times a day  influenza   Vaccine 0.5 milliLiter(s) IntraMuscular once  insulin glargine Injectable (LANTUS) 14 Unit(s) SubCutaneous at bedtime  insulin lispro (ADMELOG) corrective regimen sliding scale   SubCutaneous every 6 hours  iron sucrose IVPB 200 milliGRAM(s) IV Intermittent every 24 hours  metoprolol succinate ER 25 milliGRAM(s) Oral daily  predniSONE   Tablet 10 milliGRAM(s) Oral daily  sodium bicarbonate 650 milliGRAM(s) Oral three times a day  sodium chloride 0.9% lock flush 10 milliLiter(s) IV Push every 1 hour PRN          Radiology:          HPI:  Patient is a 52 y/o F w/ a PMHx of ESRD not on HD, poorly controlled Type 2 DM on insulin, HTN, and sarcoidosis, with a recent admission at [facility] (9/6-9/7) for HD initiation but left AMA before HD was started, who is presenting with chills and lower back pain. Patient states that she has had worsening chills since Friday and this morning woke up with 10/10 back pain. Patient's daughter states that the patient has been more tired in the past week since her prior AMA from [facility]. Denies any shortness of breath, cough, chest pain. Denies any sick contacts or recent travel. Patient also endorses worsening leg swelling since leaving [facility]. Patient denies any SOB, chest pain, abdominal pain, nausea/vomiting, diarrhea, or urinary symptoms.    Patient previously presented to the [facility] ED for initiation of dialysis on 09/06/2023 with creatinine of 9 and elevated potassium, also with fatigue and loss of appetite. At that time she had a fingerstick glucose in the 660s stating that she did not take her insulin or Lantus for > 2 days 2/2 neuropathic pain. She was found to have anion gap metabolic acidosis (bicarb 18, VBG pH 7.33) and hyperkalemia. MICU at that time deemed that she was not in need of emergent dialysis. Patient signed out AMA on 09/07/2023 due to frustrations with delays in the process of getting a Shiley placed for HD.    ED Course:  In the ED, patient was febrile (Tmax 102.9), tachycardic (102), hypertensive (174/76mmHg), requiring 2L NC after desatting to 88% on RA. Labs significant for leukocytosis (WBC 14.78), anemia (Hb 6.7), elevated BUN (144) and creatinine (10.92), hypocalcemia (7.4). UA was wnl. RVP positive for enterorhinovirus. CXR w/ bibasilar hazy opacities.   (20 Sep 2023 13:01)      ENDOCRINE HX:    Diabetes:  History/diagnosis: T2DM dx 10 years ago  Follows with: PCP, but has upcoming appt in November to see ivan Fagan  Last hemoglobin A1c: 9.2%  Home regimen: basaglar 35 units qhs; admelog 10 units TIDAC  Adherence: misses doses occasionally due to hypoglycemia  Complications: + neuropathy, + ESRD  Of note, patient also takes prednisone 10mg daily    Patient denies any personal or family history of calcium disorders. She denies neck surgery, diuretic use, or the use of bisphosphonates or any other medications for low bone density. She does have a hx of autoimmune disease (sarcoidosis). Workup notable for corrected calcium 6.7, low PTH, low vitamin D, and elevated phos i/s/o ESRD. Started on phoslo TID. Patient states she was formerly on a vitamin D supplement but has not taken it in months. She reports paraesthesias in her feet but has a hx of neuropathy. She denies muscle spasms, palpitations, or SOB.      PAST MEDICAL & SURGICAL HISTORY:  Sarcoidosis      Diabetes      Hypertension      Hyperlipidemia      Chronic kidney disease (CKD), stage III (moderate)      Seizure disorder      No significant past surgical history          FAMILY HISTORY:  Family history of diabetes mellitus in first degree relative        Social History: No tobacco or alcohol use    Outpatient Medications: basaglar 35 units qhs; admelog 10 units TIDAC; prednisone 10mg daily    MEDICATIONS  (STANDING):  amLODIPine   Tablet 10 milliGRAM(s) Oral daily  atorvastatin 80 milliGRAM(s) Oral at bedtime  calcium acetate 667 milliGRAM(s) Oral three times a day with meals  chlorhexidine 4% Liquid 1 Application(s) Topical <User Schedule>  dextrose 5%. 1000 milliLiter(s) (100 mL/Hr) IV Continuous <Continuous>  dextrose 5%. 1000 milliLiter(s) (50 mL/Hr) IV Continuous <Continuous>  dextrose 50% Injectable 12.5 Gram(s) IV Push once  dextrose 50% Injectable 25 Gram(s) IV Push once  dextrose 50% Injectable 25 Gram(s) IV Push once  epoetin suzette-epbx (RETACRIT) Injectable 4000 Unit(s) IV Push <User Schedule>  famotidine    Tablet 20 milliGRAM(s) Oral daily  gabapentin 100 milliGRAM(s) Oral three times a day  glucagon  Injectable 1 milliGRAM(s) IntraMuscular once  hydrALAZINE 100 milliGRAM(s) Oral three times a day  influenza   Vaccine 0.5 milliLiter(s) IntraMuscular once  insulin glargine Injectable (LANTUS) 14 Unit(s) SubCutaneous at bedtime  insulin lispro (ADMELOG) corrective regimen sliding scale   SubCutaneous every 6 hours  iron sucrose IVPB 200 milliGRAM(s) IV Intermittent every 24 hours  metoprolol succinate ER 25 milliGRAM(s) Oral daily  predniSONE   Tablet 10 milliGRAM(s) Oral daily  sodium bicarbonate 650 milliGRAM(s) Oral three times a day    MEDICATIONS  (PRN):  acetaminophen     Tablet .. 650 milliGRAM(s) Oral every 6 hours PRN Mild Pain (1 - 3)  dextrose Oral Gel 15 Gram(s) Oral once PRN Blood Glucose LESS THAN 70 milliGRAM(s)/deciliter  sodium chloride 0.9% lock flush 10 milliLiter(s) IV Push every 1 hour PRN Pre/post blood products, medications, blood draw, and to maintain line patency      Allergies    No Known Allergies    Intolerances      Review of Systems:  Constitutional: No fever  Eyes: No blurry vision  Neuro: No tremors  HEENT: No pain  Cardiovascular: No chest pain, palpitations  Respiratory: No SOB, no cough  GI: No nausea, vomiting, abdominal pain  : No dysuria  Skin: no rash  Psych: no depression  Endocrine: no polyuria, polydipsia  Hem/lymph: no swelling  Osteoporosis: no fractures    ALL OTHER SYSTEMS REVIEWED AND NEGATIVE    PHYSICAL EXAM:  VITALS: T(C): 36.2 (09-22-23 @ 12:08)  T(F): 97.1 (09-22-23 @ 12:08), Max: 98.8 (09-22-23 @ 00:54)  HR: 97 (09-22-23 @ 12:08) (78 - 101)  BP: 156/84 (09-22-23 @ 12:08) (136/74 - 172/73)  RR:  (17 - 18)  SpO2:  (95% - 100%)  Wt(kg): --  GENERAL: NAD, well-groomed, well-developed  EYES: No proptosis, no lid lag, anicteric  HEENT:  Atraumatic, Normocephalic, moist mucous membranes  THYROID: Normal size, no palpable nodules  RESPIRATORY: Normal respiratory effort; no audible wheezing  SKIN: Dry, intact, No rashes or lesions; RIJ dialysis catheter in place  MUSCULOSKELETAL: Full range of motion, normal strength  NEURO: sensation intact, extraocular movements intact, no tremor  PSYCH: Alert and oriented x 3, normal affect, normal mood  CUSHING'S SIGNS: no striae                          7.7    6.00  )-----------( 176      ( 22 Sep 2023 07:17 )             22.9       09-22    140  |  98  |  73<H>  ----------------------------<  112<H>  4.0   |  26  |  6.91<H>    eGFR: 7<L>    Ca    8.4      09-22  Mg     2.6     09-22  Phos  6.7     09-22    TPro  6.2  /  Alb  3.1<L>  /  TBili  0.2  /  DBili  x   /  AST  7<L>  /  ALT  8<L>  /  AlkPhos  40  09-22      A1C with Estimated Average Glucose Result: 9.2 % (09-21-23 @ 05:16)  A1C with Estimated Average Glucose Result: 10.4 % (09-06-23 @ 17:15)  A1C with Estimated Average Glucose Result: 10.6 % (01-27-23 @ 18:12)  A1C with Estimated Average Glucose Result: 10.6 % (01-27-23 @ 06:44)      CAPILLARY BLOOD GLUCOSE      POCT Blood Glucose.: 116 mg/dL (22 Sep 2023 06:00)  POCT Blood Glucose.: 211 mg/dL (22 Sep 2023 00:48)  POCT Blood Glucose.: 179 mg/dL (21 Sep 2023 21:24)  POCT Blood Glucose.: 141 mg/dL (21 Sep 2023 15:58)      Thyroid Function Tests:  acetaminophen     Tablet .. 650 milliGRAM(s) Oral every 6 hours PRN  amLODIPine   Tablet 10 milliGRAM(s) Oral daily  atorvastatin 80 milliGRAM(s) Oral at bedtime  calcium acetate 667 milliGRAM(s) Oral three times a day with meals  chlorhexidine 4% Liquid 1 Application(s) Topical <User Schedule>  dextrose 5%. 1000 milliLiter(s) IV Continuous <Continuous>  dextrose 5%. 1000 milliLiter(s) IV Continuous <Continuous>  dextrose 50% Injectable 12.5 Gram(s) IV Push once  dextrose 50% Injectable 25 Gram(s) IV Push once  dextrose 50% Injectable 25 Gram(s) IV Push once  dextrose Oral Gel 15 Gram(s) Oral once PRN  epoetin suzette-epbx (RETACRIT) Injectable 4000 Unit(s) IV Push <User Schedule>  famotidine    Tablet 20 milliGRAM(s) Oral daily  gabapentin 100 milliGRAM(s) Oral three times a day  glucagon  Injectable 1 milliGRAM(s) IntraMuscular once  hydrALAZINE 100 milliGRAM(s) Oral three times a day  influenza   Vaccine 0.5 milliLiter(s) IntraMuscular once  insulin glargine Injectable (LANTUS) 14 Unit(s) SubCutaneous at bedtime  insulin lispro (ADMELOG) corrective regimen sliding scale   SubCutaneous every 6 hours  iron sucrose IVPB 200 milliGRAM(s) IV Intermittent every 24 hours  metoprolol succinate ER 25 milliGRAM(s) Oral daily  predniSONE   Tablet 10 milliGRAM(s) Oral daily  sodium bicarbonate 650 milliGRAM(s) Oral three times a day  sodium chloride 0.9% lock flush 10 milliLiter(s) IV Push every 1 hour PRN          Radiology:

## 2023-09-22 NOTE — PROGRESS NOTE ADULT - ASSESSMENT
Patient is a 54 y/o F w/ a PMHx of ESRD not on HD, poorly controlled Type 2 DM on insulin, HTN, and sarcoidosis, with a recent admission at [facility] (9/6-9/7) for HD initiation but left AMA before HD was started, who is presenting with chills and lower back pain, found to be enterorhinovirus positive on RVP with leukocytosis, as well as anemia and elevated BUN/creatinine, admitted for HD initiation and suspected viral pneumonia.

## 2023-09-22 NOTE — MEDICAL STUDENT PROGRESS NOTE(EDUCATION) - SUBJECTIVE AND OBJECTIVE BOX
52 yo F with PMHx of CKD not on dialysis, poorly controlled DM on insulin and Lantus, HTN, HLD, Sarcoidosis, GERD, and BASIM who presented to the [facility] ED for initiation of dialysis on 09/06/2023 with creatinine of 9 and elevated potassium, also with fatigue and loss of appetite. At that time she had a fingerstick glucose in the 660s stating that she did not take her insulin or Lantus for > 2 days 2/2 neuropathic pain. She was found to have anion gap metabolic acidosis (bicarb 18, VBG pH 7.33) and hyperkalemia. MICU at that time deemed that she was not in need of emergent dialysis. Signed out AMA on 09/07/2023. Patient presented yesterday 9/19 for worsening chills since Friday, 10/10 back pain and excessive fatigue. She has no sick contacts, no recent travel. No SOB. The daughter reports worsening leg swelling recently. Daughter denies any changes to her mental status.    Interval History: Patient is more alert and less somnolent today. She still endorses chills but denies back pain. She has bilateral LE pain but with improved edema today.    Objective:    LABS:                         8.3    9.30  )-----------( 162      ( 21 Sep 2023 05:13 )             24.4     09-21    139  |  93<L>  |  133<H>  ----------------------------<  104<H>  5.2   |  20<L>  |  11.42<H>    Ca    8.0<L>      21 Sep 2023 05:15  Phos  10.6     09-21  Mg     3.1     09-21    TPro  6.7  /  Alb  3.3  /  TBili  0.3  /  DBili  x   /  AST  11  /  ALT  9<L>  /  AlkPhos  43  09-21      Urinalysis Basic - ( 21 Sep 2023 05:15 )    Color: x / Appearance: x / SG: x / pH: x  Gluc: 104 mg/dL / Ketone: x  / Bili: x / Urobili: x   Blood: x / Protein: x / Nitrite: x   Leuk Esterase: x / RBC: x / WBC x   Sq Epi: x / Non Sq Epi: x / Bacteria: x      PHYSICAL EXAM:   T(C): 36.6 (09-21-23 @ 12:58), Max: 37.7 (09-20-23 @ 19:50)  HR: 91 (09-21-23 @ 12:58) (85 - 103)  BP: 146/80 (09-21-23 @ 12:58) (146/80 - 167/88)  RR: 18 (09-21-23 @ 04:50) (18 - 19)  SpO2: 95% (09-21-23 @ 12:58) (95% - 100%)    GENERAL: Well-appearing, well-nourished, NAD  EYES: EOMI, PERRL no scleral icterus  NECK: No JVD, no nodules, no carotid bruits  LUNG: CTAB, no wheezes, no rhonchi, no accessory muscle use  HEART: RRR, no m/g/r  ABDOMEN: Soft, NT, ND, + bowel sounds, no bruits  EXTREMITIES: BLE 1+ edema, 2+ DP pulses, no clubbing, no cynanosis  PSYCH: AAOx3, normal affect, normal mood, tired  NEUROLOGY: non-focal, strength 5/5 in all extremities, sensation intact, bilateral LE tingling pain  SKIN: No rashes or lesions    IMAGING:     ACC: [id number] EXAM:  XR LS SPINE AP LAT 2-3 VIEWS   ORDERED BY:  CELIA GREENE     PROCEDURE DATE:  09/20/2023          INTERPRETATION:  CLINICAL INFORMATION: Back pain.    TECHNIQUE: 2 views of the lumbosacral spine were obtained.    COMPARISON: Lumbar spine x-rays 9/22/2022.    FINDINGS:  The vertebral body heights are maintained. No acute compression fracture   or malalignment of the vertebral bodies. Disc space heights are   maintained.      IMPRESSION:  No evidence of acute compression deformity or traumatic malalignment of   the lumbosacral spine.      ACC: [id number] EXAM:  XR CHEST PORTABLE URGENT 1V   ORDERED BY: DANIAL CAMPOVERDE     PROCEDURE DATE:  09/20/2023          INTERPRETATION:  EXAMINATION: XR CHEST URGENT    CLINICAL INDICATION: cough    TECHNIQUE: Single frontal portable view of the chest from 9/20/2023 12:23   AM    COMPARISON: 9/6/2023    FINDINGS:    The heart size is not accurately assessed on this projection.  Bibasilar hazy opacities.  There is no pneumothorax. Trace bilateral pleural effusions.    IMPRESSION:  Bibasilar hazy opacities.  
52 yo F with PMHx of CKD not on dialysis, poorly controlled DM on insulin and Lantus, HTN, HLD, Sarcoidosis, GERD, and BASIM who presented to the [facility] ED for initiation of dialysis on 09/06/2023 with creatinine of 9 and elevated potassium, also with fatigue and loss of appetite. At that time she had a fingerstick glucose in the 660s stating that she did not take her insulin or Lantus for > 2 days 2/2 neuropathic pain. She was found to have anion gap metabolic acidosis (bicarb 18, VBG pH 7.33) and hyperkalemia. MICU at that time deemed that she was not in need of emergent dialysis. Signed out AMA on 09/07/2023. Patient presented yesterday 9/19 for worsening chills since Friday, 10/10 back pain and excessive fatigue. She has no sick contacts, no recent travel. No SOB. The daughter reports worsening leg swelling recently. Daughter denies any changes to her mental status.    Interval History: Patient is more alert and less somnolent today. Patient got Shiley placed yesterday and started HD last night. She still endorses chills but denies back pain. She has bilateral LE pain but with improved edema today. She reports trouble sleeping and was given melatonin last night.     Objective:    LABS:   LABS:                        7.7    6.00  )-----------( 176      ( 22 Sep 2023 07:17 )             22.9     09-22    140  |  98  |  73<H>  ----------------------------<  112<H>  4.0   |  26  |  6.91<H>    Ca    8.4      22 Sep 2023 07:17  Phos  6.7     09-22  Mg     2.6     09-22    TPro  6.2  /  Alb  3.1<L>  /  TBili  0.2  /  DBili  x   /  AST  7<L>  /  ALT  8<L>  /  AlkPhos  40  09-22    Urinalysis Basic - ( 22 Sep 2023 07:17 )    Color: x / Appearance: x / SG: x / pH: x  Gluc: 112 mg/dL / Ketone: x  / Bili: x / Urobili: x   Blood: x / Protein: x / Nitrite: x   Leuk Esterase: x / RBC: x / WBC x   Sq Epi: x / Non Sq Epi: x / Bacteria: x    PHYSICAL EXAM:   T(C): 36.2 (09-22-23 @ 12:08), Max: 37.1 (09-22-23 @ 00:54)  HR: 97 (09-22-23 @ 12:08) (78 - 101)  BP: 156/84 (09-22-23 @ 12:08) (136/74 - 185/92)  RR: 18 (09-22-23 @ 12:08) (15 - 18)  SpO2: 100% (09-22-23 @ 12:08) (95% - 100%)    GENERAL: Fatigued, well-nourished, NAD  EYES: EOMI, PERRL no scleral icterus  NECK: No JVD, no nodules, no carotid bruits  LUNG: CTAB, no wheezes, no rhonchi, no accessory muscle use  HEART: RRR, no m/g/r  ABDOMEN: Soft, NT, ND, + bowel sounds, no bruits  EXTREMITIES:  No BLE edema, 2+ DP pulses, no clubbing, no cynanosis  PSYCH: AAOx3, normal affect, normal mood  NEUROLOGY: non-focal, strength 5/5 in all extremities, sensation intact  SKIN: No rashes or lesions    IMAGING:     ACC: [id number] EXAM:  XR LS SPINE AP LAT 2-3 VIEWS   ORDERED BY:  CELIA GREENE     PROCEDURE DATE:  09/20/2023        INTERPRETATION:  CLINICAL INFORMATION: Back pain.    TECHNIQUE: 2 views of the lumbosacral spine were obtained.    COMPARISON: Lumbar spine x-rays 9/22/2022.    FINDINGS:  The vertebral body heights are maintained. No acute compression fracture   or malalignment of the vertebral bodies. Disc space heights are   maintained.      IMPRESSION:  No evidence of acute compression deformity or traumatic malalignment of   the lumbosacral spine.      ACC: [id number] EXAM:  XR CHEST PORTABLE URGENT 1V   ORDERED BY: DANIAL CAMPOVERDE     PROCEDURE DATE:  09/20/2023          INTERPRETATION:  EXAMINATION: XR CHEST URGENT    CLINICAL INDICATION: cough    TECHNIQUE: Single frontal portable view of the chest from 9/20/2023 12:23   AM    COMPARISON: 9/6/2023    FINDINGS:    The heart size is not accurately assessed on this projection.  Bibasilar hazy opacities.  There is no pneumothorax. Trace bilateral pleural effusions.    IMPRESSION:  Bibasilar hazy opacities.

## 2023-09-22 NOTE — CONSULT NOTE ADULT - ASSESSMENT
Patient is a 52 y/o F w/ a PMHx of ESRD not on HD, poorly controlled Type 2 DM on insulin, HTN, and sarcoidosis, with a recent admission at [facility] (9/6-9/7) for HD initiation but left AMA before HD was started, who is presenting with chills and lower back pain, found to be enterorhinovirus positive on RVP with leukocytosis, as well as anemia and elevated BUN/creatinine, admitted for HD initiation and suspected viral pneumonia. Endocrinology consulted for hypocalcemia.     #Hypocalcemia  #Hypoparathyroidism  #Vitamin D deficiency  - Patient denies any personal or family history of calcium disorders. No hx neck surgery, diuretic use, or the use of bisphosphonates or any other medications for low bone density.   - Hypocalcemia likely multifactorial i/s/o ESRD w/ elevated phos, severe vitamin D deficiency, and hypoparathyroidism  - currently asymptomatic  - Corrected Ca 7.0 -> 9.4  - PTH 7, low; 25OH vitamin D 7.5, low; 1-25OH vitamin D  <5, low; phos 10.6 -> 6.7, high  PLAN  ***    #Uncontrolled Type 2 Diabetes Mellitus   - Hemoglobin A1c: 9.2%  - home regimen:  basaglar 35 units qhs; admelog 10 units TIDAC  - patient takes prednisone 10mg daily  - no evidence of DKA on labs  INPATIENT PLAN:  - c/w Lantus 14 units QHS   - patient reports no appetite today; if she starts eating meals can start admelog 3 units TIDAC  - Low dose admelog correction scale TIDQAC and separate low dose scale QHS  - Please check FSG before meals and QHS, or q6h while NPO  - Inpatient glucose goals: <140 pre-meal, <180 random  - consistent carb diet when eating  DISCHARGE PLANNING:  - Discharge recs pending clinical course: likely c/w basal/bolus insulin (dose TBD)   - will need Endocrinology follow up. Please provide clinic info in DC paperwork for patient to make an appointment:    #Hypertension  - Goal BP <130/80  - on hydralazine and metoprolol  - Management as per primary team    #Hyperlipidemia  - LDL goal <100  - on atorvastatin    Deandre Blanton MD  Endocrine Fellow  For nonurgent matters, please email [email or web address] or [email or web address]. For urgent follow up questions, discharge recommendations, or new consults please call answering service at [phone] (weekdays), [phone] (nights/weekends).    Patient is a 52 y/o F w/ a PMHx of ESRD not on HD, poorly controlled Type 2 DM on insulin, HTN, and sarcoidosis, with a recent admission at [facility] (9/6-9/7) for HD initiation but left AMA before HD was started, who is presenting with chills and lower back pain, found to be enterorhinovirus positive on RVP with leukocytosis, as well as anemia and elevated BUN/creatinine, admitted for HD initiation and suspected viral pneumonia. Endocrinology consulted for hypocalcemia.     #Hypocalcemia  #Hypoparathyroidism  #Vitamin D deficiency  - Patient denies any personal or family history of calcium disorders. No hx neck surgery, diuretic use, or the use of bisphosphonates or any other medications for low bone density.   - Hypocalcemia likely multifactorial i/s/o ESRD w/ elevated phos, severe vitamin D deficiency, and possible hypoparathyroidism (versus low PTH i/s/o electrolyte abnormalities)  - currently asymptomatic  - Corrected Ca 7.0 -> 9.4  - PTH 7, low; 25OH vitamin D 7.5, low; 1-25OH vitamin D  <5, low; phos 10.6 -> 6.7, high  PLAN  - please repeat PTH  - start ergocalciferol 23380 units qweekly  - will consider calcitriol if repeat PTH low  - c/w HD as per nephro    #Uncontrolled Type 2 Diabetes Mellitus   - Hemoglobin A1c: 9.2%  - home regimen:  basaglar 35 units qhs; admelog 10 units TIDAC  - patient takes prednisone 10mg daily  - no evidence of DKA on labs  INPATIENT PLAN:  - c/w Lantus 14 units QHS   - patient reports no appetite today; if she starts eating meals can start admelog 3 units TID before meals  - c/w Low dose admelog correction scale TIDQAC and separate low dose scale QHS  - Please check FSG before meals and QHS, or q6h while NPO  - Inpatient glucose goals: <140 pre-meal, <180 random  - consistent carb diet when eating  DISCHARGE PLANNING:  - Discharge recs pending clinical course: likely c/w basal/bolus insulin (dose TBD)   - will need Endocrinology follow up. Please provide clinic info in DC paperwork for patient to make an appointment:    #Hypertension  - Goal BP <130/80  - on hydralazine and metoprolol  - Management as per primary team    #Hyperlipidemia  - LDL goal <100  - on atorvastatin    Deandre Blanton MD  Endocrine Fellow  For nonurgent matters, please email [email or web address] or [email or web address]. For urgent follow up questions, discharge recommendations, or new consults please call answering service at [phone] (weekdays), [phone] (nights/weekends).    Patient is a 54 y/o F w/ a PMHx of ESRD not on HD, poorly controlled Type 2 DM on insulin, HTN, and sarcoidosis, with a recent admission at [facility] (9/6-9/7) for HD initiation but left AMA before HD was started, who is presenting with chills and lower back pain, found to be enterorhinovirus positive on RVP with leukocytosis, as well as anemia and elevated BUN/creatinine, admitted for HD initiation and suspected viral pneumonia. Endocrinology consulted for hypocalcemia.     #Hypocalcemia  #Hypoparathyroidism  #Vitamin D deficiency  - Patient denies any personal or family history of calcium disorders. No hx neck surgery, diuretic use, or the use of bisphosphonates or any other medications for low bone density.   - Hypocalcemia likely multifactorial i/s/o ESRD w/ elevated phos, severe vitamin D deficiency, and possible hypoparathyroidism (versus low PTH i/s/o electrolyte abnormalities)  - currently asymptomatic  - Corrected Ca 7.0 -> 9.4  - PTH 7, low; 25OH vitamin D 7.5, low; 1-25OH vitamin D  <5, low; phos 10.6 -> 6.7, high  PLAN  - please repeat PTH  - start ergocalciferol 84929 units qweekly  - will consider calcitriol if repeat PTH low  - c/w calcium acetate as ordered  - c/w HD as per nephro    #Uncontrolled Type 2 Diabetes Mellitus   - Hemoglobin A1c: 9.2%  - home regimen:  basaglar 35 units qhs; admelog 10 units TIDAC  - patient takes prednisone 10mg daily  - no evidence of DKA on labs  INPATIENT PLAN:  - c/w Lantus 14 units QHS   - patient reports no appetite today; if she starts eating meals can start admelog 3 units TID before meals  - c/w Low dose admelog correction scale TIDQAC and separate low dose scale QHS  - Please check FSG before meals and QHS, or q6h while NPO  - Inpatient glucose goals: <140 pre-meal, <180 random  - consistent carb diet when eating  DISCHARGE PLANNING:  - Discharge recs pending clinical course: likely c/w basal/bolus insulin (dose TBD)   - will need Endocrinology follow up. Please provide clinic info in DC paperwork for patient to make an appointment:    #Hypertension  - Goal BP <130/80  - on hydralazine and metoprolol  - Management as per primary team    #Hyperlipidemia  - LDL goal <100  - on atorvastatin    Deandre Blanton MD  Endocrine Fellow  For nonurgent matters, please email [email or web address] or [email or web address]. For urgent follow up questions, discharge recommendations, or new consults please call answering service at [phone] (weekdays), [phone] (nights/weekends).    Patient is a 52 y/o F w/ a PMHx of ESRD not on HD, poorly controlled Type 2 DM on insulin, HTN, and sarcoidosis, with a recent admission at [facility] (9/6-9/7) for HD initiation but left AMA before HD was started, who is presenting with chills and lower back pain, found to be enterorhinovirus positive on RVP with leukocytosis, as well as anemia and elevated BUN/creatinine, admitted for HD initiation and suspected viral pneumonia. Endocrinology consulted for hypocalcemia (high risk patient with severely uncontrolled diabetes with A1c of 10.4% at high risk of CAD and CVA with high level decision-making).      #Hypocalcemia  #Hypoparathyroidism  #Vitamin D deficiency  - Patient denies any personal or family history of calcium disorders. No hx neck surgery, diuretic use, or the use of bisphosphonates or any other medications for low bone density.   - Hypocalcemia likely multifactorial i/s/o ESRD w/ elevated phos, severe vitamin D deficiency, and possible hypoparathyroidism (versus low PTH i/s/o electrolyte abnormalities)  - currently asymptomatic  - Corrected Ca 7.0 -> 9.4  - PTH 7, low; 25OH vitamin D 7.5, low; 1-25OH vitamin D  <5, low; phos 10.6 -> 6.7, high  PLAN  - please repeat PTH  - start ergocalciferol 93366 units qweekly  - will consider calcitriol if repeat PTH low  - c/w calcium acetate as ordered  - c/w HD as per nephro    #Uncontrolled Type 2 Diabetes Mellitus   - Hemoglobin A1c: 9.2%  - home regimen:  basaglar 35 units qhs; admelog 10 units TIDAC  - patient takes prednisone 10mg daily  - no evidence of DKA on labs  INPATIENT PLAN:  - c/w Lantus 14 units QHS   - patient reports no appetite today; if she starts eating meals can start admelog 3 units TID before meals  - c/w Low dose admelog correction scale TIDQAC and separate low dose scale QHS  - Please check FSG before meals and QHS, or q6h while NPO  - Inpatient glucose goals: <140 pre-meal, <180 random  - consistent carb diet when eating  DISCHARGE PLANNING:  - Discharge recs pending clinical course: likely c/w basal/bolus insulin (dose TBD)   - will need Endocrinology follow up. Please provide clinic info in DC paperwork for patient to make an appointment:    #Hypertension  - Goal BP <130/80  - on hydralazine and metoprolol  - Management as per primary team    #Hyperlipidemia  - LDL goal <100  - on atorvastatin    Deandre Blanton MD  Endocrine Fellow  For nonurgent matters, please email [email or web address] or [email or web address]. For urgent follow up questions, discharge recommendations, or new consults please call answering service at [phone] (weekdays), [phone] (nights/weekends).

## 2023-09-22 NOTE — MEDICAL STUDENT PROGRESS NOTE(EDUCATION) - PLAN 1
- Most recent Cr 10.33,   - Pt started HD last night
- Most recent Cr 10.33,   - Pt for insertion of Shiley to initiate HD

## 2023-09-22 NOTE — PROGRESS NOTE ADULT - ASSESSMENT
Patient is a 54 y/o F w/ a PMHx of ESRD not on HD, poorly controlled Type 2 DM on insulin, HTN, and sarcoidosis here for initiation of dialysis.

## 2023-09-22 NOTE — PROGRESS NOTE ADULT - PROBLEM SELECTOR PLAN 4
Pt endorsing new onset 10/10 back pain starting yesterday, with focal spinal tenderness on palpation of lumbar spine (L2-L4).  - Xray lumbar spine w/ no fractures/dislocations  - Pain control as needed

## 2023-09-22 NOTE — PROGRESS NOTE ADULT - PROBLEM SELECTOR PLAN 10
- DVT PPx: heparin subq  - Diet: NPO for Shiley placement  - GOC: full code  - Pharmacy: Rx pharmacy on [address]  - Dispo: pending clinical course  - Communication:

## 2023-09-22 NOTE — PROGRESS NOTE ADULT - PROBLEM SELECTOR PLAN 5
Pt with a history of type 2 diabetes mellitus requiring insulin at home. Home regimen: Basaglar 35 units qhs with missed doses, Novolog 10 units premeal with missed doses, Trulicity 1.5mg SQ weekly with missed doses,   - A1c 10.4% on 9/6/23  - Endocrinology consulted on prior admission, rec Lantus 28 units qhs and Admelog 7/7/7 TID premeal  - c/w Lantus 14 units qhs given patient's decreased PO intake and current NPO status  - c/w Admelog correctional scale q6h (for NPO)  - FS q6h (while NPO) Pt with a history of type 2 diabetes mellitus requiring insulin at home. Home regimen: Basaglar 35 units qhs with missed doses, Novolog 10 units premeal with missed doses, Trulicity 1.5mg SQ weekly with missed doses,   - A1c 10.4% on 9/6/23  - Endocrinology consulted on prior admission, rec Lantus 28 units qhs and Admelog 7/7/7 TID premeal  - c/w Lantus 14 units qhs given patient's decreased PO intake and current NPO status  - c/w Admelog correctional scale premeal TID and qhs  - FS premeal TID and qhs

## 2023-09-22 NOTE — PROGRESS NOTE ADULT - SUBJECTIVE AND OBJECTIVE BOX
PROGRESS NOTE:   Patient is a 53y old  Female who presents with a chief complaint of Chills (21 Sep 2023 14:12)    SUBJECTIVE / OVERNIGHT EVENTS:  No acute events overnight.      MEDICATIONS  (STANDING):  amLODIPine   Tablet 10 milliGRAM(s) Oral daily  atorvastatin 80 milliGRAM(s) Oral at bedtime  calcium acetate 667 milliGRAM(s) Oral three times a day with meals  cefepime   IVPB 500 milliGRAM(s) IV Intermittent every 24 hours  chlorhexidine 4% Liquid 1 Application(s) Topical <User Schedule>  dextrose 5%. 1000 milliLiter(s) (100 mL/Hr) IV Continuous <Continuous>  dextrose 5%. 1000 milliLiter(s) (50 mL/Hr) IV Continuous <Continuous>  dextrose 50% Injectable 12.5 Gram(s) IV Push once  dextrose 50% Injectable 25 Gram(s) IV Push once  dextrose 50% Injectable 25 Gram(s) IV Push once  famotidine    Tablet 20 milliGRAM(s) Oral daily  gabapentin 100 milliGRAM(s) Oral three times a day  glucagon  Injectable 1 milliGRAM(s) IntraMuscular once  hydrALAZINE 100 milliGRAM(s) Oral three times a day  influenza   Vaccine 0.5 milliLiter(s) IntraMuscular once  insulin glargine Injectable (LANTUS) 14 Unit(s) SubCutaneous at bedtime  insulin lispro (ADMELOG) corrective regimen sliding scale   SubCutaneous every 6 hours  iron sucrose IVPB 200 milliGRAM(s) IV Intermittent every 24 hours  metoprolol succinate ER 25 milliGRAM(s) Oral daily  predniSONE   Tablet 10 milliGRAM(s) Oral daily  sodium bicarbonate 650 milliGRAM(s) Oral three times a day    MEDICATIONS  (PRN):  acetaminophen     Tablet .. 650 milliGRAM(s) Oral every 6 hours PRN Mild Pain (1 - 3)  dextrose Oral Gel 15 Gram(s) Oral once PRN Blood Glucose LESS THAN 70 milliGRAM(s)/deciliter  sodium chloride 0.9% lock flush 10 milliLiter(s) IV Push every 1 hour PRN Pre/post blood products, medications, blood draw, and to maintain line patency      CAPILLARY BLOOD GLUCOSE  POCT Blood Glucose.: 116 mg/dL (22 Sep 2023 06:00)  POCT Blood Glucose.: 211 mg/dL (22 Sep 2023 00:48)  POCT Blood Glucose.: 179 mg/dL (21 Sep 2023 21:24)  POCT Blood Glucose.: 141 mg/dL (21 Sep 2023 15:58)  POCT Blood Glucose.: 258 mg/dL (21 Sep 2023 12:31)  POCT Blood Glucose.: 174 mg/dL (21 Sep 2023 09:03)    I&O's Summary  21 Sep 2023 07:01  -  22 Sep 2023 07:00  --------------------------------------------------------  IN: 200 mL / OUT: 1900 mL / NET: -1700 mL      PHYSICAL EXAM:  Vital Signs Last 24 Hrs  T(C): 36.8 (22 Sep 2023 05:08), Max: 37.1 (22 Sep 2023 00:54)  T(F): 98.3 (22 Sep 2023 05:08), Max: 98.8 (22 Sep 2023 00:54)  HR: 82 (22 Sep 2023 05:08) (78 - 101)  BP: 136/74 (22 Sep 2023 05:08) (136/74 - 185/92)  BP(mean): 98 (21 Sep 2023 15:40) (78 - 116)  RR: 18 (22 Sep 2023 05:08) (15 - 18)  SpO2: 98% (22 Sep 2023 05:08) (95% - 100%)  Parameters below as of 22 Sep 2023 05:08  Patient On (Oxygen Delivery Method): room air    CONSTITUTIONAL: NAD, well-developed, tired but able to answer questions  HEENT: PERRLA, EOMI, moist mucous membranes.  NECK: Supple. No JVD.  RESPIRATORY: Normal respiratory effort. Bibasilar crackles.  CARDIOVASCULAR: Regular rate and rhythm with normal S1 and S2. No murmurs.  ABDOMEN: Soft, non-tender, non-distended. Normoactive bowel sounds, no rebound/guarding; No hepatosplenomegaly  EXTREMITIES: 1+ BLE pitting edema. 2+ peripheral pulses. No clubbing, cyanosis.  MUSCULOSKELETAL: +TTP to lumbar spine (~L2-L4). No joint swelling or tenderness to palpation  SKIN: No rashes or lesions  NEUROLOGIC: A&Ox3. No focal deficits.  PSYCH: Affect appropriate      LABS:                        8.3    9.30  )-----------( 162      ( 21 Sep 2023 05:13 )             24.4     09-21  139  |  93<L>  |  133<H>  ----------------------------<  104<H>  5.2   |  20<L>  |  11.42<H>    Ca    8.0<L>      21 Sep 2023 05:15  Phos  10.6     09-21  Mg     3.1     09-21    TPro  6.7  /  Alb  3.3  /  TBili  0.3  /  DBili  x   /  AST  11  /  ALT  9<L>  /  AlkPhos  43  09-21    Urinalysis Basic - ( 21 Sep 2023 05:15 )  Color: x / Appearance: x / SG: x / pH: x  Gluc: 104 mg/dL / Ketone: x  / Bili: x / Urobili: x   Blood: x / Protein: x / Nitrite: x   Leuk Esterase: x / RBC: x / WBC x   Sq Epi: x / Non Sq Epi: x / Bacteria: x      Culture - Blood (collected 19 Sep 2023 23:30)  Source: .Blood Blood-Peripheral  Preliminary Report (22 Sep 2023 04:01):    No growth at 48 Hours    Culture - Blood (collected 19 Sep 2023 21:45)  Source: .Blood Blood-Peripheral  Preliminary Report (22 Sep 2023 04:01):    No growth at 48 Hours        RADIOLOGY & ADDITIONAL TESTS:  Results Reviewed:   Imaging Personally Reviewed:  Electrocardiogram Personally Reviewed:    COORDINATION OF CARE:  Care Discussed with Consultants/Other Providers [Y/N]:  Prior or Outpatient Records Reviewed [Y/N]: PROGRESS NOTE:   Patient is a 53y old  Female who presents with a chief complaint of Chills (21 Sep 2023 14:12)    SUBJECTIVE / OVERNIGHT EVENTS:  No acute events overnight. Patient states that she did not sleep well because she was cold and there was too much noise. Otherwise states that her back pain is improved and her leg swelling is going down. Endorses some tenderness around the R Shiley site. Patient denies any fever, chills, SOB, chest pain, abdominal pain, nausea/vomiting, diarrhea, or urinary symptoms.      MEDICATIONS  (STANDING):  amLODIPine   Tablet 10 milliGRAM(s) Oral daily  atorvastatin 80 milliGRAM(s) Oral at bedtime  calcium acetate 667 milliGRAM(s) Oral three times a day with meals  cefepime   IVPB 500 milliGRAM(s) IV Intermittent every 24 hours  chlorhexidine 4% Liquid 1 Application(s) Topical <User Schedule>  dextrose 5%. 1000 milliLiter(s) (100 mL/Hr) IV Continuous <Continuous>  dextrose 5%. 1000 milliLiter(s) (50 mL/Hr) IV Continuous <Continuous>  dextrose 50% Injectable 12.5 Gram(s) IV Push once  dextrose 50% Injectable 25 Gram(s) IV Push once  dextrose 50% Injectable 25 Gram(s) IV Push once  famotidine    Tablet 20 milliGRAM(s) Oral daily  gabapentin 100 milliGRAM(s) Oral three times a day  glucagon  Injectable 1 milliGRAM(s) IntraMuscular once  hydrALAZINE 100 milliGRAM(s) Oral three times a day  influenza   Vaccine 0.5 milliLiter(s) IntraMuscular once  insulin glargine Injectable (LANTUS) 14 Unit(s) SubCutaneous at bedtime  insulin lispro (ADMELOG) corrective regimen sliding scale   SubCutaneous every 6 hours  iron sucrose IVPB 200 milliGRAM(s) IV Intermittent every 24 hours  metoprolol succinate ER 25 milliGRAM(s) Oral daily  predniSONE   Tablet 10 milliGRAM(s) Oral daily  sodium bicarbonate 650 milliGRAM(s) Oral three times a day    MEDICATIONS  (PRN):  acetaminophen     Tablet .. 650 milliGRAM(s) Oral every 6 hours PRN Mild Pain (1 - 3)  dextrose Oral Gel 15 Gram(s) Oral once PRN Blood Glucose LESS THAN 70 milliGRAM(s)/deciliter  sodium chloride 0.9% lock flush 10 milliLiter(s) IV Push every 1 hour PRN Pre/post blood products, medications, blood draw, and to maintain line patency      CAPILLARY BLOOD GLUCOSE  POCT Blood Glucose.: 116 mg/dL (22 Sep 2023 06:00)  POCT Blood Glucose.: 211 mg/dL (22 Sep 2023 00:48)  POCT Blood Glucose.: 179 mg/dL (21 Sep 2023 21:24)  POCT Blood Glucose.: 141 mg/dL (21 Sep 2023 15:58)  POCT Blood Glucose.: 258 mg/dL (21 Sep 2023 12:31)  POCT Blood Glucose.: 174 mg/dL (21 Sep 2023 09:03)    I&O's Summary  21 Sep 2023 07:01  -  22 Sep 2023 07:00  --------------------------------------------------------  IN: 200 mL / OUT: 1900 mL / NET: -1700 mL      PHYSICAL EXAM:  Vital Signs Last 24 Hrs  T(C): 36.8 (22 Sep 2023 05:08), Max: 37.1 (22 Sep 2023 00:54)  T(F): 98.3 (22 Sep 2023 05:08), Max: 98.8 (22 Sep 2023 00:54)  HR: 82 (22 Sep 2023 05:08) (78 - 101)  BP: 136/74 (22 Sep 2023 05:08) (136/74 - 185/92)  BP(mean): 98 (21 Sep 2023 15:40) (78 - 116)  RR: 18 (22 Sep 2023 05:08) (15 - 18)  SpO2: 98% (22 Sep 2023 05:08) (95% - 100%)  Parameters below as of 22 Sep 2023 05:08  Patient On (Oxygen Delivery Method): room air    CONSTITUTIONAL: NAD, well-developed, tired but able to answer questions  HEENT: PERRLA, EOMI, moist mucous membranes.  NECK: Supple. No JVD. +R IJ Shiley, site appears c/d/i.  RESPIRATORY: Normal respiratory effort. Lungs CTAB.  CARDIOVASCULAR: Regular rate and rhythm with normal S1 and S2. No murmurs.  ABDOMEN: Soft, non-tender, non-distended. Normoactive bowel sounds, no rebound/guarding; No hepatosplenomegaly  EXTREMITIES: 1+ BLE pitting edema. 2+ peripheral pulses. No clubbing, cyanosis.  MUSCULOSKELETAL: +TTP to lumbar spine (~L2-L4). No joint swelling or tenderness to palpation  SKIN: No rashes or lesions  NEUROLOGIC: A&Ox3. No focal deficits.  PSYCH: Affect appropriate      LABS:                        8.3    9.30  )-----------( 162      ( 21 Sep 2023 05:13 )             24.4     09-21  139  |  93<L>  |  133<H>  ----------------------------<  104<H>  5.2   |  20<L>  |  11.42<H>    Ca    8.0<L>      21 Sep 2023 05:15  Phos  10.6     09-21  Mg     3.1     09-21    TPro  6.7  /  Alb  3.3  /  TBili  0.3  /  DBili  x   /  AST  11  /  ALT  9<L>  /  AlkPhos  43  09-21    Urinalysis Basic - ( 21 Sep 2023 05:15 )  Color: x / Appearance: x / SG: x / pH: x  Gluc: 104 mg/dL / Ketone: x  / Bili: x / Urobili: x   Blood: x / Protein: x / Nitrite: x   Leuk Esterase: x / RBC: x / WBC x   Sq Epi: x / Non Sq Epi: x / Bacteria: x      Culture - Blood (collected 19 Sep 2023 23:30)  Source: .Blood Blood-Peripheral  Preliminary Report (22 Sep 2023 04:01):    No growth at 48 Hours    Culture - Blood (collected 19 Sep 2023 21:45)  Source: .Blood Blood-Peripheral  Preliminary Report (22 Sep 2023 04:01):    No growth at 48 Hours        RADIOLOGY & ADDITIONAL TESTS:  Results Reviewed:   Imaging Personally Reviewed:  Electrocardiogram Personally Reviewed:    COORDINATION OF CARE:  Care Discussed with Consultants/Other Providers [Y/N]:  Prior or Outpatient Records Reviewed [Y/N]:

## 2023-09-23 LAB
ALBUMIN SERPL ELPH-MCNC: 3 G/DL — LOW (ref 3.3–5)
ALP SERPL-CCNC: 39 U/L — LOW (ref 40–120)
ALT FLD-CCNC: 8 U/L — LOW (ref 10–45)
ANION GAP SERPL CALC-SCNC: 15 MMOL/L — SIGNIFICANT CHANGE UP (ref 5–17)
AST SERPL-CCNC: 12 U/L — SIGNIFICANT CHANGE UP (ref 10–40)
BASOPHILS # BLD AUTO: 0.02 K/UL — SIGNIFICANT CHANGE UP (ref 0–0.2)
BASOPHILS NFR BLD AUTO: 0.4 % — SIGNIFICANT CHANGE UP (ref 0–2)
BILIRUB SERPL-MCNC: 0.1 MG/DL — LOW (ref 0.2–1.2)
BUN SERPL-MCNC: 41 MG/DL — HIGH (ref 7–23)
CALCIUM SERPL-MCNC: 8.1 MG/DL — LOW (ref 8.4–10.5)
CALCIUM SERPL-MCNC: 8.5 MG/DL — SIGNIFICANT CHANGE UP (ref 8.4–10.5)
CHLORIDE SERPL-SCNC: 100 MMOL/L — SIGNIFICANT CHANGE UP (ref 96–108)
CO2 SERPL-SCNC: 26 MMOL/L — SIGNIFICANT CHANGE UP (ref 22–31)
CREAT SERPL-MCNC: 4.87 MG/DL — HIGH (ref 0.5–1.3)
EGFR: 10 ML/MIN/1.73M2 — LOW
EOSINOPHIL # BLD AUTO: 0.25 K/UL — SIGNIFICANT CHANGE UP (ref 0–0.5)
EOSINOPHIL NFR BLD AUTO: 4.5 % — SIGNIFICANT CHANGE UP (ref 0–6)
GLUCOSE BLDC GLUCOMTR-MCNC: 151 MG/DL — HIGH (ref 70–99)
GLUCOSE BLDC GLUCOMTR-MCNC: 198 MG/DL — HIGH (ref 70–99)
GLUCOSE BLDC GLUCOMTR-MCNC: 239 MG/DL — HIGH (ref 70–99)
GLUCOSE BLDC GLUCOMTR-MCNC: 266 MG/DL — HIGH (ref 70–99)
GLUCOSE SERPL-MCNC: 87 MG/DL — SIGNIFICANT CHANGE UP (ref 70–99)
HCT VFR BLD CALC: 23.1 % — LOW (ref 34.5–45)
HGB BLD-MCNC: 7.6 G/DL — LOW (ref 11.5–15.5)
IMM GRANULOCYTES NFR BLD AUTO: 0.5 % — SIGNIFICANT CHANGE UP (ref 0–0.9)
LYMPHOCYTES # BLD AUTO: 1.69 K/UL — SIGNIFICANT CHANGE UP (ref 1–3.3)
LYMPHOCYTES # BLD AUTO: 30.5 % — SIGNIFICANT CHANGE UP (ref 13–44)
MAGNESIUM SERPL-MCNC: 2.2 MG/DL — SIGNIFICANT CHANGE UP (ref 1.6–2.6)
MCHC RBC-ENTMCNC: 29 PG — SIGNIFICANT CHANGE UP (ref 27–34)
MCHC RBC-ENTMCNC: 32.9 GM/DL — SIGNIFICANT CHANGE UP (ref 32–36)
MCV RBC AUTO: 88.2 FL — SIGNIFICANT CHANGE UP (ref 80–100)
MONOCYTES # BLD AUTO: 0.71 K/UL — SIGNIFICANT CHANGE UP (ref 0–0.9)
MONOCYTES NFR BLD AUTO: 12.8 % — SIGNIFICANT CHANGE UP (ref 2–14)
NEUTROPHILS # BLD AUTO: 2.85 K/UL — SIGNIFICANT CHANGE UP (ref 1.8–7.4)
NEUTROPHILS NFR BLD AUTO: 51.3 % — SIGNIFICANT CHANGE UP (ref 43–77)
NRBC # BLD: 0 /100 WBCS — SIGNIFICANT CHANGE UP (ref 0–0)
PHOSPHATE SERPL-MCNC: 5.4 MG/DL — HIGH (ref 2.5–4.5)
PLATELET # BLD AUTO: 173 K/UL — SIGNIFICANT CHANGE UP (ref 150–400)
POTASSIUM SERPL-MCNC: 3.8 MMOL/L — SIGNIFICANT CHANGE UP (ref 3.5–5.3)
POTASSIUM SERPL-SCNC: 3.8 MMOL/L — SIGNIFICANT CHANGE UP (ref 3.5–5.3)
PROT SERPL-MCNC: 6 G/DL — SIGNIFICANT CHANGE UP (ref 6–8.3)
PTH-INTACT FLD-MCNC: 138 PG/ML — HIGH (ref 15–65)
RBC # BLD: 2.62 M/UL — LOW (ref 3.8–5.2)
RBC # FLD: 12.5 % — SIGNIFICANT CHANGE UP (ref 10.3–14.5)
SODIUM SERPL-SCNC: 141 MMOL/L — SIGNIFICANT CHANGE UP (ref 135–145)
WBC # BLD: 5.55 K/UL — SIGNIFICANT CHANGE UP (ref 3.8–10.5)
WBC # FLD AUTO: 5.55 K/UL — SIGNIFICANT CHANGE UP (ref 3.8–10.5)

## 2023-09-23 PROCEDURE — 99232 SBSQ HOSP IP/OBS MODERATE 35: CPT | Mod: GC

## 2023-09-23 PROCEDURE — 99233 SBSQ HOSP IP/OBS HIGH 50: CPT

## 2023-09-23 RX ORDER — LANOLIN ALCOHOL/MO/W.PET/CERES
3 CREAM (GRAM) TOPICAL AT BEDTIME
Refills: 0 | Status: DISCONTINUED | OUTPATIENT
Start: 2023-09-23 | End: 2023-09-23

## 2023-09-23 RX ORDER — TAMSULOSIN HYDROCHLORIDE 0.4 MG/1
0.4 CAPSULE ORAL AT BEDTIME
Refills: 0 | Status: DISCONTINUED | OUTPATIENT
Start: 2023-09-23 | End: 2023-09-28

## 2023-09-23 RX ORDER — LANOLIN ALCOHOL/MO/W.PET/CERES
3 CREAM (GRAM) TOPICAL AT BEDTIME
Refills: 0 | Status: DISCONTINUED | OUTPATIENT
Start: 2023-09-23 | End: 2023-09-28

## 2023-09-23 RX ORDER — SENNA PLUS 8.6 MG/1
2 TABLET ORAL AT BEDTIME
Refills: 0 | Status: DISCONTINUED | OUTPATIENT
Start: 2023-09-23 | End: 2023-09-28

## 2023-09-23 RX ORDER — LANOLIN ALCOHOL/MO/W.PET/CERES
3 CREAM (GRAM) TOPICAL ONCE
Refills: 0 | Status: COMPLETED | OUTPATIENT
Start: 2023-09-23 | End: 2023-09-23

## 2023-09-23 RX ORDER — INSULIN LISPRO 100/ML
3 VIAL (ML) SUBCUTANEOUS
Refills: 0 | Status: DISCONTINUED | OUTPATIENT
Start: 2023-09-23 | End: 2023-09-24

## 2023-09-23 RX ADMIN — Medication 3 MILLIGRAM(S): at 21:26

## 2023-09-23 RX ADMIN — ERGOCALCIFEROL 50000 UNIT(S): 1.25 CAPSULE ORAL at 11:34

## 2023-09-23 RX ADMIN — Medication 650 MILLIGRAM(S): at 13:09

## 2023-09-23 RX ADMIN — Medication 667 MILLIGRAM(S): at 09:17

## 2023-09-23 RX ADMIN — CHLORHEXIDINE GLUCONATE 1 APPLICATION(S): 213 SOLUTION TOPICAL at 13:04

## 2023-09-23 RX ADMIN — ATORVASTATIN CALCIUM 80 MILLIGRAM(S): 80 TABLET, FILM COATED ORAL at 21:26

## 2023-09-23 RX ADMIN — INSULIN GLARGINE 14 UNIT(S): 100 INJECTION, SOLUTION SUBCUTANEOUS at 21:57

## 2023-09-23 RX ADMIN — TAMSULOSIN HYDROCHLORIDE 0.4 MILLIGRAM(S): 0.4 CAPSULE ORAL at 21:32

## 2023-09-23 RX ADMIN — GABAPENTIN 100 MILLIGRAM(S): 400 CAPSULE ORAL at 05:44

## 2023-09-23 RX ADMIN — Medication 100 MILLIGRAM(S): at 13:08

## 2023-09-23 RX ADMIN — Medication 1: at 18:14

## 2023-09-23 RX ADMIN — AMLODIPINE BESYLATE 10 MILLIGRAM(S): 2.5 TABLET ORAL at 05:44

## 2023-09-23 RX ADMIN — Medication 3: at 13:10

## 2023-09-23 RX ADMIN — GABAPENTIN 100 MILLIGRAM(S): 400 CAPSULE ORAL at 13:09

## 2023-09-23 RX ADMIN — Medication 25 MILLIGRAM(S): at 05:44

## 2023-09-23 RX ADMIN — Medication 1: at 09:17

## 2023-09-23 RX ADMIN — Medication 667 MILLIGRAM(S): at 11:35

## 2023-09-23 RX ADMIN — Medication 650 MILLIGRAM(S): at 13:45

## 2023-09-23 RX ADMIN — Medication 667 MILLIGRAM(S): at 17:09

## 2023-09-23 RX ADMIN — GABAPENTIN 100 MILLIGRAM(S): 400 CAPSULE ORAL at 21:26

## 2023-09-23 RX ADMIN — IRON SUCROSE 110 MILLIGRAM(S): 20 INJECTION, SOLUTION INTRAVENOUS at 17:08

## 2023-09-23 RX ADMIN — Medication 100 MILLIGRAM(S): at 21:25

## 2023-09-23 RX ADMIN — SENNA PLUS 2 TABLET(S): 8.6 TABLET ORAL at 21:27

## 2023-09-23 RX ADMIN — FAMOTIDINE 20 MILLIGRAM(S): 10 INJECTION INTRAVENOUS at 11:35

## 2023-09-23 RX ADMIN — Medication 10 MILLIGRAM(S): at 05:45

## 2023-09-23 RX ADMIN — Medication 3 MILLIGRAM(S): at 01:15

## 2023-09-23 RX ADMIN — Medication 100 MILLIGRAM(S): at 05:45

## 2023-09-23 NOTE — PROGRESS NOTE ADULT - ASSESSMENT
Patient is a 54 y/o F w/ a PMHx of ESRD not on HD, poorly controlled Type 2 DM on insulin, HTN, and sarcoidosis, with a recent admission at [facility] (9/6-9/7) for HD initiation but left AMA before HD was started, who is presenting with chills and lower back pain, found to be enterorhinovirus positive on RVP with leukocytosis, as well as anemia and elevated BUN/creatinine, admitted for HD initiation and suspected viral pneumonia.

## 2023-09-23 NOTE — PROGRESS NOTE ADULT - SUBJECTIVE AND OBJECTIVE BOX
PROGRESS NOTE:     Patient is a 53y old  Female who presents with a chief complaint of Chills (22 Sep 2023 15:22)      SUBJECTIVE / OVERNIGHT EVENTS:    ADDITIONAL REVIEW OF SYSTEMS:    MEDICATIONS  (STANDING):  amLODIPine   Tablet 10 milliGRAM(s) Oral daily  atorvastatin 80 milliGRAM(s) Oral at bedtime  calcium acetate 667 milliGRAM(s) Oral three times a day with meals  chlorhexidine 4% Liquid 1 Application(s) Topical <User Schedule>  dextrose 5%. 1000 milliLiter(s) (100 mL/Hr) IV Continuous <Continuous>  dextrose 5%. 1000 milliLiter(s) (50 mL/Hr) IV Continuous <Continuous>  dextrose 50% Injectable 12.5 Gram(s) IV Push once  dextrose 50% Injectable 25 Gram(s) IV Push once  dextrose 50% Injectable 25 Gram(s) IV Push once  epoetin suzette-epbx (RETACRIT) Injectable 4000 Unit(s) IV Push <User Schedule>  ergocalciferol 52069 Unit(s) Oral every week  famotidine    Tablet 20 milliGRAM(s) Oral daily  gabapentin 100 milliGRAM(s) Oral three times a day  glucagon  Injectable 1 milliGRAM(s) IntraMuscular once  hydrALAZINE 100 milliGRAM(s) Oral three times a day  influenza   Vaccine 0.5 milliLiter(s) IntraMuscular once  insulin glargine Injectable (LANTUS) 14 Unit(s) SubCutaneous at bedtime  insulin lispro (ADMELOG) corrective regimen sliding scale   SubCutaneous at bedtime  insulin lispro (ADMELOG) corrective regimen sliding scale   SubCutaneous three times a day before meals  iron sucrose IVPB 200 milliGRAM(s) IV Intermittent every 24 hours  metoprolol succinate ER 25 milliGRAM(s) Oral daily  predniSONE   Tablet 10 milliGRAM(s) Oral daily    MEDICATIONS  (PRN):  acetaminophen     Tablet .. 650 milliGRAM(s) Oral every 6 hours PRN Mild Pain (1 - 3)  dextrose Oral Gel 15 Gram(s) Oral once PRN Blood Glucose LESS THAN 70 milliGRAM(s)/deciliter  sodium chloride 0.9% lock flush 10 milliLiter(s) IV Push every 1 hour PRN Pre/post blood products, medications, blood draw, and to maintain line patency      CAPILLARY BLOOD GLUCOSE      POCT Blood Glucose.: 179 mg/dL (22 Sep 2023 22:15)  POCT Blood Glucose.: 186 mg/dL (22 Sep 2023 17:21)    I&O's Summary    22 Sep 2023 07:01  -  23 Sep 2023 07:00  --------------------------------------------------------  IN: 1070 mL / OUT: 1350 mL / NET: -280 mL        PHYSICAL EXAM:  Vital Signs Last 24 Hrs  T(C): 36.8 (23 Sep 2023 05:13), Max: 37 (22 Sep 2023 21:33)  T(F): 98.2 (23 Sep 2023 05:13), Max: 98.6 (22 Sep 2023 21:33)  HR: 74 (23 Sep 2023 05:13) (74 - 97)  BP: 129/66 (23 Sep 2023 05:13) (129/66 - 172/73)  BP(mean): --  RR: 18 (23 Sep 2023 05:13) (18 - 18)  SpO2: 99% (23 Sep 2023 05:13) (96% - 100%)    Parameters below as of 23 Sep 2023 05:13  Patient On (Oxygen Delivery Method): room air        CONSTITUTIONAL: NAD, well-developed  RESPIRATORY: Normal respiratory effort; lungs are clear to auscultation bilaterally  CARDIOVASCULAR: Regular rate and rhythm, normal S1 and S2, no murmur/rub/gallop; No lower extremity edema; Peripheral pulses are 2+ bilaterally  ABDOMEN: Nontender to palpation, normoactive bowel sounds, no rebound/guarding; No hepatosplenomegaly  MUSCULOSKELETAL: no clubbing or cyanosis of digits; no joint swelling or tenderness to palpation  PSYCH: A+O to person, place, and time; affect appropriate    LABS:                        7.7    6.00  )-----------( 176      ( 22 Sep 2023 07:17 )             22.9     09-22    140  |  98  |  73<H>  ----------------------------<  112<H>  4.0   |  26  |  6.91<H>    Ca    8.4      22 Sep 2023 07:17  Phos  6.7     09-22  Mg     2.6     09-22    TPro  6.2  /  Alb  3.1<L>  /  TBili  0.2  /  DBili  x   /  AST  7<L>  /  ALT  8<L>  /  AlkPhos  40  09-22          Urinalysis Basic - ( 22 Sep 2023 07:17 )    Color: x / Appearance: x / SG: x / pH: x  Gluc: 112 mg/dL / Ketone: x  / Bili: x / Urobili: x   Blood: x / Protein: x / Nitrite: x   Leuk Esterase: x / RBC: x / WBC x   Sq Epi: x / Non Sq Epi: x / Bacteria: x          RADIOLOGY & ADDITIONAL TESTS:  Results Reviewed:   Imaging Personally Reviewed:  Electrocardiogram Personally Reviewed:    COORDINATION OF CARE:  Care Discussed with Consultants/Other Providers [Y/N]:  Prior or Outpatient Records Reviewed [Y/N]:      ******************************  Authored By: Kris Garcia MD PGY2  Internal Medicine  Pager: [phone] [facility]; [phone] [facility]  MS Teams Preferred  ******************************   PROGRESS NOTE:     Patient is a 53y old  Female who presents with a chief complaint of Chills (22 Sep 2023 15:22)      SUBJECTIVE / OVERNIGHT EVENTS: No acute issues overnight per night team. Pt this morning ambulating around room, only c/o difficulty sleeping and persistent b/l peripheral neuropathic pain. HD yesterday, some tiredness afterwards - nothing afterwards.    ADDITIONAL REVIEW OF SYSTEMS:    MEDICATIONS  (STANDING):  amLODIPine   Tablet 10 milliGRAM(s) Oral daily  atorvastatin 80 milliGRAM(s) Oral at bedtime  calcium acetate 667 milliGRAM(s) Oral three times a day with meals  chlorhexidine 4% Liquid 1 Application(s) Topical <User Schedule>  dextrose 5%. 1000 milliLiter(s) (100 mL/Hr) IV Continuous <Continuous>  dextrose 5%. 1000 milliLiter(s) (50 mL/Hr) IV Continuous <Continuous>  dextrose 50% Injectable 12.5 Gram(s) IV Push once  dextrose 50% Injectable 25 Gram(s) IV Push once  dextrose 50% Injectable 25 Gram(s) IV Push once  epoetin suzette-epbx (RETACRIT) Injectable 4000 Unit(s) IV Push <User Schedule>  ergocalciferol 64795 Unit(s) Oral every week  famotidine    Tablet 20 milliGRAM(s) Oral daily  gabapentin 100 milliGRAM(s) Oral three times a day  glucagon  Injectable 1 milliGRAM(s) IntraMuscular once  hydrALAZINE 100 milliGRAM(s) Oral three times a day  influenza   Vaccine 0.5 milliLiter(s) IntraMuscular once  insulin glargine Injectable (LANTUS) 14 Unit(s) SubCutaneous at bedtime  insulin lispro (ADMELOG) corrective regimen sliding scale   SubCutaneous at bedtime  insulin lispro (ADMELOG) corrective regimen sliding scale   SubCutaneous three times a day before meals  iron sucrose IVPB 200 milliGRAM(s) IV Intermittent every 24 hours  metoprolol succinate ER 25 milliGRAM(s) Oral daily  predniSONE   Tablet 10 milliGRAM(s) Oral daily    MEDICATIONS  (PRN):  acetaminophen     Tablet .. 650 milliGRAM(s) Oral every 6 hours PRN Mild Pain (1 - 3)  dextrose Oral Gel 15 Gram(s) Oral once PRN Blood Glucose LESS THAN 70 milliGRAM(s)/deciliter  sodium chloride 0.9% lock flush 10 milliLiter(s) IV Push every 1 hour PRN Pre/post blood products, medications, blood draw, and to maintain line patency      CAPILLARY BLOOD GLUCOSE      POCT Blood Glucose.: 179 mg/dL (22 Sep 2023 22:15)  POCT Blood Glucose.: 186 mg/dL (22 Sep 2023 17:21)    I&O's Summary    22 Sep 2023 07:01  -  23 Sep 2023 07:00  --------------------------------------------------------  IN: 1070 mL / OUT: 1350 mL / NET: -280 mL        PHYSICAL EXAM:  Vital Signs Last 24 Hrs  T(C): 36.8 (23 Sep 2023 05:13), Max: 37 (22 Sep 2023 21:33)  T(F): 98.2 (23 Sep 2023 05:13), Max: 98.6 (22 Sep 2023 21:33)  HR: 74 (23 Sep 2023 05:13) (74 - 97)  BP: 129/66 (23 Sep 2023 05:13) (129/66 - 172/73)  BP(mean): --  RR: 18 (23 Sep 2023 05:13) (18 - 18)  SpO2: 99% (23 Sep 2023 05:13) (96% - 100%)    Parameters below as of 23 Sep 2023 05:13  Patient On (Oxygen Delivery Method): room air      CONSTITUTIONAL: NAD, well-developed  HEENT: PERRLA, EOMI, moist mucous membranes  NECK: Supple. No JVD. +R IJ Shiley, site appears c/d/i  RESPIRATORY: Normal respiratory effort. Lungs CTAB.  CARDIOVASCULAR: Regular rate and rhythm with normal S1 and S2. No murmurs.  ABDOMEN: Soft, non-tender, non-distended. Normoactive bowel sounds, no rebound/guarding; No hepatosplenomegaly  EXTREMITIES: 1+ BLE pitting edema. 2+ peripheral pulses. No clubbing, cyanosis.  MUSCULOSKELETAL: No joint swelling or tenderness to palpation  SKIN: No rashes or lesions  NEUROLOGIC: A&Ox3. No focal deficits  PSYCH: Affect appropriate      LABS:                        7.7    6.00  )-----------( 176      ( 22 Sep 2023 07:17 )             22.9     09-22    140  |  98  |  73<H>  ----------------------------<  112<H>  4.0   |  26  |  6.91<H>    Ca    8.4      22 Sep 2023 07:17  Phos  6.7     09-22  Mg     2.6     09-22    TPro  6.2  /  Alb  3.1<L>  /  TBili  0.2  /  DBili  x   /  AST  7<L>  /  ALT  8<L>  /  AlkPhos  40  09-22          Urinalysis Basic - ( 22 Sep 2023 07:17 )    Color: x / Appearance: x / SG: x / pH: x  Gluc: 112 mg/dL / Ketone: x  / Bili: x / Urobili: x   Blood: x / Protein: x / Nitrite: x   Leuk Esterase: x / RBC: x / WBC x   Sq Epi: x / Non Sq Epi: x / Bacteria: x          RADIOLOGY & ADDITIONAL TESTS:  Results Reviewed:   Imaging Personally Reviewed:  Electrocardiogram Personally Reviewed:    COORDINATION OF CARE:  Care Discussed with Consultants/Other Providers [Y/N]:  Prior or Outpatient Records Reviewed [Y/N]:      ******************************  Authored By: Kris Garcia MD PGY2  Internal Medicine  Pager: [phone] [facility]; [id number] [facility]  MS Teams Preferred  ******************************   PROGRESS NOTE:     Patient is a 53y old  Female who presents with a chief complaint of Chills (22 Sep 2023 15:22)      SUBJECTIVE / OVERNIGHT EVENTS:No acute issues overnight per night team. Pt ambulating without issues, HD yesterday - some generalized fatigue afterwards but improved this morning. Pt having persistent worsening b/l neuropathic pain, rai still in place, difficulty sleeping. Amenable to standing melatonin, gabapentin c/w.    ADDITIONAL REVIEW OF SYSTEMS:    MEDICATIONS  (STANDING):  amLODIPine   Tablet 10 milliGRAM(s) Oral daily  atorvastatin 80 milliGRAM(s) Oral at bedtime  calcium acetate 667 milliGRAM(s) Oral three times a day with meals  chlorhexidine 4% Liquid 1 Application(s) Topical <User Schedule>  dextrose 5%. 1000 milliLiter(s) (100 mL/Hr) IV Continuous <Continuous>  dextrose 5%. 1000 milliLiter(s) (50 mL/Hr) IV Continuous <Continuous>  dextrose 50% Injectable 12.5 Gram(s) IV Push once  dextrose 50% Injectable 25 Gram(s) IV Push once  dextrose 50% Injectable 25 Gram(s) IV Push once  epoetin suzette-epbx (RETACRIT) Injectable 4000 Unit(s) IV Push <User Schedule>  ergocalciferol 12251 Unit(s) Oral every week  famotidine    Tablet 20 milliGRAM(s) Oral daily  gabapentin 100 milliGRAM(s) Oral three times a day  glucagon  Injectable 1 milliGRAM(s) IntraMuscular once  hydrALAZINE 100 milliGRAM(s) Oral three times a day  influenza   Vaccine 0.5 milliLiter(s) IntraMuscular once  insulin glargine Injectable (LANTUS) 14 Unit(s) SubCutaneous at bedtime  insulin lispro (ADMELOG) corrective regimen sliding scale   SubCutaneous at bedtime  insulin lispro (ADMELOG) corrective regimen sliding scale   SubCutaneous three times a day before meals  iron sucrose IVPB 200 milliGRAM(s) IV Intermittent every 24 hours  metoprolol succinate ER 25 milliGRAM(s) Oral daily  predniSONE   Tablet 10 milliGRAM(s) Oral daily    MEDICATIONS  (PRN):  acetaminophen     Tablet .. 650 milliGRAM(s) Oral every 6 hours PRN Mild Pain (1 - 3)  dextrose Oral Gel 15 Gram(s) Oral once PRN Blood Glucose LESS THAN 70 milliGRAM(s)/deciliter  sodium chloride 0.9% lock flush 10 milliLiter(s) IV Push every 1 hour PRN Pre/post blood products, medications, blood draw, and to maintain line patency      CAPILLARY BLOOD GLUCOSE      POCT Blood Glucose.: 179 mg/dL (22 Sep 2023 22:15)  POCT Blood Glucose.: 186 mg/dL (22 Sep 2023 17:21)    I&O's Summary    22 Sep 2023 07:01  -  23 Sep 2023 07:00  --------------------------------------------------------  IN: 1070 mL / OUT: 1350 mL / NET: -280 mL        PHYSICAL EXAM:  Vital Signs Last 24 Hrs  T(C): 36.8 (23 Sep 2023 05:13), Max: 37 (22 Sep 2023 21:33)  T(F): 98.2 (23 Sep 2023 05:13), Max: 98.6 (22 Sep 2023 21:33)  HR: 74 (23 Sep 2023 05:13) (74 - 97)  BP: 129/66 (23 Sep 2023 05:13) (129/66 - 172/73)  BP(mean): --  RR: 18 (23 Sep 2023 05:13) (18 - 18)  SpO2: 99% (23 Sep 2023 05:13) (96% - 100%)    Parameters below as of 23 Sep 2023 05:13  Patient On (Oxygen Delivery Method): room air      CONSTITUTIONAL: NAD, well-developed  HEENT: PERRLA, EOMI, moist mucous membranes  NECK: Supple. No JVD. +R IJ Shiley, site appears c/d/i  RESPIRATORY: Normal respiratory effort. Lungs CTAB.  CARDIOVASCULAR: Regular rate and rhythm with normal S1 and S2. No murmurs.  ABDOMEN: Soft, non-tender, non-distended. Normoactive bowel sounds, no rebound/guarding; No hepatosplenomegaly  EXTREMITIES: mild non-pitting edema. 2+ peripheral pulses. No clubbing, cyanosis.  MUSCULOSKELETAL: No joint swelling or tenderness to palpation  SKIN: No rashes or lesions  NEUROLOGIC: A&Ox3. No focal deficits  PSYCH: Affect appropriate      LABS:                        7.7    6.00  )-----------( 176      ( 22 Sep 2023 07:17 )             22.9     09-22    140  |  98  |  73<H>  ----------------------------<  112<H>  4.0   |  26  |  6.91<H>    Ca    8.4      22 Sep 2023 07:17  Phos  6.7     09-22  Mg     2.6     09-22    TPro  6.2  /  Alb  3.1<L>  /  TBili  0.2  /  DBili  x   /  AST  7<L>  /  ALT  8<L>  /  AlkPhos  40  09-22          Urinalysis Basic - ( 22 Sep 2023 07:17 )    Color: x / Appearance: x / SG: x / pH: x  Gluc: 112 mg/dL / Ketone: x  / Bili: x / Urobili: x   Blood: x / Protein: x / Nitrite: x   Leuk Esterase: x / RBC: x / WBC x   Sq Epi: x / Non Sq Epi: x / Bacteria: x          RADIOLOGY & ADDITIONAL TESTS:  Results Reviewed:   Imaging Personally Reviewed:  Electrocardiogram Personally Reviewed:    COORDINATION OF CARE:  Care Discussed with Consultants/Other Providers [Y/N]:  Prior or Outpatient Records Reviewed [Y/N]:      ******************************  Authored By: Kris Garcia MD PGY2  Internal Medicine  Pager: [phone] [facility]; [phone] [facility]  MS Teams Preferred  ******************************

## 2023-09-23 NOTE — PROGRESS NOTE ADULT - PROBLEM SELECTOR PLAN 5
Pt with a history of type 2 diabetes mellitus requiring insulin at home. Home regimen: Basaglar 35 units qhs with missed doses, Novolog 10 units premeal with missed doses, Trulicity 1.5mg SQ weekly with missed doses,   - A1c 10.4% on 9/6/23  - Endocrinology consulted on prior admission, rec Lantus 28 units qhs and Admelog 7/7/7 TID premeal  - c/w Lantus 14 units qhs given patient's decreased PO intake and current NPO status  - c/w Admelog correctional scale premeal TID and qhs  - FS premeal TID and qhs

## 2023-09-23 NOTE — PROGRESS NOTE ADULT - SUBJECTIVE AND OBJECTIVE BOX
Interval events:   Seen at the bedside. patient is tolerating diet, denies nausea or vomiting. denies abdominal pain.   corrected calcium 9.3 today with repeat        MEDICATIONS  (STANDING):  amLODIPine   Tablet 10 milliGRAM(s) Oral daily  atorvastatin 80 milliGRAM(s) Oral at bedtime  calcium acetate 667 milliGRAM(s) Oral three times a day with meals  chlorhexidine 4% Liquid 1 Application(s) Topical <User Schedule>  dextrose 5%. 1000 milliLiter(s) (50 mL/Hr) IV Continuous <Continuous>  dextrose 5%. 1000 milliLiter(s) (100 mL/Hr) IV Continuous <Continuous>  dextrose 50% Injectable 25 Gram(s) IV Push once  dextrose 50% Injectable 25 Gram(s) IV Push once  dextrose 50% Injectable 12.5 Gram(s) IV Push once  epoetin suzette-epbx (RETACRIT) Injectable 4000 Unit(s) IV Push <User Schedule>  ergocalciferol 11880 Unit(s) Oral every week  famotidine    Tablet 20 milliGRAM(s) Oral daily  gabapentin 100 milliGRAM(s) Oral three times a day  glucagon  Injectable 1 milliGRAM(s) IntraMuscular once  hydrALAZINE 100 milliGRAM(s) Oral three times a day  influenza   Vaccine 0.5 milliLiter(s) IntraMuscular once  insulin glargine Injectable (LANTUS) 14 Unit(s) SubCutaneous at bedtime  insulin lispro (ADMELOG) corrective regimen sliding scale   SubCutaneous three times a day before meals  insulin lispro (ADMELOG) corrective regimen sliding scale   SubCutaneous at bedtime  insulin lispro Injectable (ADMELOG) 3 Unit(s) SubCutaneous three times a day before meals  iron sucrose IVPB 200 milliGRAM(s) IV Intermittent every 24 hours  melatonin 3 milliGRAM(s) Oral at bedtime  metoprolol succinate ER 25 milliGRAM(s) Oral daily  predniSONE   Tablet 10 milliGRAM(s) Oral daily  senna 2 Tablet(s) Oral at bedtime  tamsulosin 0.4 milliGRAM(s) Oral at bedtime    MEDICATIONS  (PRN):  acetaminophen     Tablet .. 650 milliGRAM(s) Oral every 6 hours PRN Mild Pain (1 - 3)  dextrose Oral Gel 15 Gram(s) Oral once PRN Blood Glucose LESS THAN 70 milliGRAM(s)/deciliter  sodium chloride 0.9% lock flush 10 milliLiter(s) IV Push every 1 hour PRN Pre/post blood products, medications, blood draw, and to maintain line patency      Allergies    No Known Allergies    Intolerances      Review of Systems:  Constitutional: No fever  Eyes: No blurry vision  Neuro: No tremors  HEENT: No pain  Cardiovascular: No chest pain, palpitations  Respiratory: No SOB, no cough  GI: No nausea, vomiting, abdominal pain  : No dysuria  Skin: no rash  Psych: no depression  Endocrine: no polyuria, polydipsia  Hem/lymph: no swelling  Osteoporosis: no fractures    ALL OTHER SYSTEMS REVIEWED AND NEGATIVE    PHYSICAL EXAM:  Vital Signs Last 24 Hrs  T(C): 37 (23 Sep 2023 20:35), Max: 37 (22 Sep 2023 23:54)  T(F): 98.6 (23 Sep 2023 20:35), Max: 98.6 (22 Sep 2023 23:54)  HR: 99 (23 Sep 2023 20:35) (74 - 99)  BP: 154/73 (23 Sep 2023 20:35) (115/72 - 154/73)  BP(mean): --  RR: 18 (23 Sep 2023 20:35) (18 - 18)  SpO2: 99% (23 Sep 2023 20:35) (97% - 99%)    Parameters below as of 23 Sep 2023 20:35  Patient On (Oxygen Delivery Method): room air      GENERAL: NAD, well-groomed, well-developed  EYES: No proptosis, no lid lag, anicteric  HEENT:  Atraumatic, Normocephalic, moist mucous membranes  THYROID: Normal size, no palpable nodules  RESPIRATORY: Normal respiratory effort; no audible wheezing  SKIN: Dry, intact, No rashes or lesions; RIJ dialysis catheter in place  MUSCULOSKELETAL: Full range of motion, normal strength  NEURO: sensation intact, extraocular movements intact, no tremor  PSYCH: Alert and oriented x 3, normal affect, normal mood  CUSHING'S SIGNS: no striae               09-23    141  |  100  |  41<H>  ----------------------------<  87  3.8   |  26  |  4.87<H>    Ca    8.5      23 Sep 2023 06:58  Phos  5.4     09-23  Mg     2.2     09-23    TPro  6.0  /  Alb  3.0<L>  /  TBili  0.1<L>  /  DBili  x   /  AST  12  /  ALT  8<L>  /  AlkPhos  39<L>  09-23      A1C with Estimated Average Glucose Result: 9.2 % (09-21-23 @ 05:16)  A1C with Estimated Average Glucose Result: 10.4 % (09-06-23 @ 17:15)  A1C with Estimated Average Glucose Result: 10.6 % (01-27-23 @ 18:12)  A1C with Estimated Average Glucose Result: 10.6 % (01-27-23 @ 06:44)      CAPILLARY BLOOD GLUCOSE    CAPILLARY BLOOD GLUCOSE      POCT Blood Glucose.: 239 mg/dL (23 Sep 2023 21:45)  POCT Blood Glucose.: 198 mg/dL (23 Sep 2023 17:32)  POCT Blood Glucose.: 266 mg/dL (23 Sep 2023 13:02)  POCT Blood Glucose.: 151 mg/dL (23 Sep 2023 08:34)    POCT Blood Glucose.: 116 mg/dL (22 Sep 2023 06:00)  POCT Blood Glucose.: 211 mg/dL (22 Sep 2023 00:48)  POCT Blood Glucose.: 179 mg/dL (21 Sep 2023 21:24)  POCT Blood Glucose.: 141 mg/dL (21 Sep 2023 15:58)

## 2023-09-23 NOTE — PROGRESS NOTE ADULT - ATTENDING COMMENTS
-Cr improving on HD. F/u renal recs for HD. F/u with CM for outpt HD planning.   -Endo recs appreciated. Repeat PTH elevated. C/w ergocalciferol for Vitamin D deficiency.   -Flomax nightly added in setting of [place] placed for initial episode of retention earlier in admission. Consider TOV in next day or so given anatomy required urology to place [place] and also to give some time for the flomax to work.   -OOB to chair.

## 2023-09-23 NOTE — PROGRESS NOTE ADULT - PROBLEM SELECTOR PLAN 10
- DVT PPx: heparin subq  - Diet: NPO for Shiley placement  - GOC: full code  - Pharmacy: Rx pharmacy on [address]  - Dispo: pending clinical course  - Communication:

## 2023-09-23 NOTE — PROGRESS NOTE ADULT - PROBLEM SELECTOR PROBLEM 6
Hypertension Epidermal Autograft Text: The defect edges were debeveled with a #15 scalpel blade. Given the location of the defect, shape of the defect and the proximity to free margins an epidermal autograft was deemed most appropriate. Using a sterile surgical marker, the primary defect shape was transferred to the donor site. The epidermal graft was then harvested.  The skin graft was then placed in the primary defect and oriented appropriately.

## 2023-09-23 NOTE — PROGRESS NOTE ADULT - PROBLEM SELECTOR PLAN 1
Pt w/ a history of ESRD, follows with Dr. Jose M Garibay outpt. Prior hospitalization at [facility] 9/6-9/7 for HD initiation, but left AMA. , Cr 10.92 on admission now.  - Pt states that she is unwilling to have the Shiley placed without being put to sleep for it  - Nephrology consulted, recommend initiation of HD on this admission w/ Shiley placement by IR  - Will check HBV surface antigen, PTH (hypocalcemia)  - c/w phoslo 667mg PO TID premeals for hyperphosphatemia  - c/w sodium bicabonate 650mg PO TID  - R IJ Betty placed 9/21 with first session of HD Pt w/ a history of ESRD, follows with Dr. Jose M Garibay outpt. Prior hospitalization at [facility] 9/6-9/7 for HD initiation, but left AMA. , Cr 10.92 on admission now.  - Pt states that she is unwilling to have the Shiley placed without being put to sleep for it  - Nephrology consulted, recommend initiation of HD on this admission w/ Shiley placement by IR  - Will check HBV surface antigen, PTH (hypocalcemia)  - c/w phoslo 667mg PO TID premeals for hyperphosphatemia  - c/w sodium bicabonate 650mg PO TID  - R IJ Shiley placed 9/21 with first session of HD  - Flomax started 9/23 to help with voiding (retention on admission, difficult rai), consider TOV in next couple days

## 2023-09-23 NOTE — PROGRESS NOTE ADULT - ASSESSMENT
53 F with history of ESRD not on HD, poorly controlled Type 2 DM on insulin, HTN, and sarcoidosis, with a recent admission at [facility] (9/6-9/7) for HD initiation but left AMA before HD was started, who is presenting with chills and lower back pain, found to be enterorhinovirus positive on RVP with leukocytosis, as well as anemia and elevated BUN/creatinine, admitted for HD initiation and suspected viral pneumonia. Endocrinology consulted for hypocalcemia (high risk patient with severely uncontrolled diabetes with A1c of 10.4% at high risk of CAD and CVA with high level decision-making).      #Hypocalcemia  #Hypoparathyroidism  #Vitamin D deficiency  - Patient denies any personal or family history of calcium disorders. No hx neck surgery, diuretic use, or the use of bisphosphonates or any other medications for low bone density.   - Hypocalcemia likely multifactorial i/s/o ESRD w/ elevated phos, severe vitamin D deficiency, and possible hypoparathyroidism (versus low PTH i/s/o electrolyte abnormalities)  - currently asymptomatic  - Corrected Ca 7.0 -> 9.4-->9.3 today   - PTH 7, low, repeat 138; 25OH vitamin D 7.5, low; 1-25OH vitamin D  <5, low; phos 10.6 -> 6.7, high  PLAN  - Repeat PTH elevated, more consistent with secondary hyperparathyroidism in the setting of ESRD  - contnue with ergocalciferol 71749 units qweekly  - c/w calcium acetate as ordered  - c/w HD as per nephro    #Uncontrolled Type 2 Diabetes Mellitus   - Hemoglobin A1c: 9.2%  - home regimen:  basaglar 35 units qhs; admelog 10-15 units TIDAC  - patient takes prednisone 10mg daily  - no evidence of DKA on labs  INPATIENT PLAN:  - Fasting glucose at goal, continue with Lantus 14 units QHS   - postprandial glucose elevated, start admelog 3 units TIDAC   - Continue with Low dose admelog correction scale TIDQAC and separate low dose scale QHS  - Please check FSG before meals and QHS, or q6h while NPO  - Inpatient glucose goals: <140 pre-meal, <180 random  - consistent carb diet when eating  DISCHARGE PLANNING:  - Outpatient Medications: basaglar 35 units qhs; admelog 10 units TIDAC; prednisone 10mg daily  - Discharge recs pending clinical course: likely c/w basal/bolus insulin (dose TBD)   - will need Endocrinology follow up. Please provide clinic info in DC paperwork for patient to make an appointment:    #Hypertension  - Goal BP <130/80  - on hydralazine and metoprolol  - Management as per primary team    #Hyperlipidemia  - LDL goal <100  - on atorvastatin    Adina Singleton MD  Attending Physician   Department of Endocrinology, Diabetes and Metabolism   [employer] Teams     If before 9AM or after 5PM, or on weekends/holidays, please call the Endocrine answering service for assistance ([phone]).  For nonurgent matters, please email [email or web address] for assistance.

## 2023-09-24 ENCOUNTER — TRANSCRIPTION ENCOUNTER (OUTPATIENT)
Age: 53
End: 2023-09-24

## 2023-09-24 LAB
ALBUMIN SERPL ELPH-MCNC: 3.4 G/DL — SIGNIFICANT CHANGE UP (ref 3.3–5)
ALP SERPL-CCNC: 49 U/L — SIGNIFICANT CHANGE UP (ref 40–120)
ALT FLD-CCNC: 9 U/L — LOW (ref 10–45)
ANION GAP SERPL CALC-SCNC: 17 MMOL/L — SIGNIFICANT CHANGE UP (ref 5–17)
AST SERPL-CCNC: 15 U/L — SIGNIFICANT CHANGE UP (ref 10–40)
BASOPHILS # BLD AUTO: 0.02 K/UL — SIGNIFICANT CHANGE UP (ref 0–0.2)
BASOPHILS NFR BLD AUTO: 0.4 % — SIGNIFICANT CHANGE UP (ref 0–2)
BILIRUB SERPL-MCNC: <0.1 MG/DL — LOW (ref 0.2–1.2)
BUN SERPL-MCNC: 61 MG/DL — HIGH (ref 7–23)
CALCIUM SERPL-MCNC: 8.6 MG/DL — SIGNIFICANT CHANGE UP (ref 8.4–10.5)
CHLORIDE SERPL-SCNC: 98 MMOL/L — SIGNIFICANT CHANGE UP (ref 96–108)
CO2 SERPL-SCNC: 25 MMOL/L — SIGNIFICANT CHANGE UP (ref 22–31)
CREAT SERPL-MCNC: 6.88 MG/DL — HIGH (ref 0.5–1.3)
EGFR: 7 ML/MIN/1.73M2 — LOW
EOSINOPHIL # BLD AUTO: 0.23 K/UL — SIGNIFICANT CHANGE UP (ref 0–0.5)
EOSINOPHIL NFR BLD AUTO: 4.1 % — SIGNIFICANT CHANGE UP (ref 0–6)
GLUCOSE BLDC GLUCOMTR-MCNC: 204 MG/DL — HIGH (ref 70–99)
GLUCOSE BLDC GLUCOMTR-MCNC: 246 MG/DL — HIGH (ref 70–99)
GLUCOSE BLDC GLUCOMTR-MCNC: 322 MG/DL — HIGH (ref 70–99)
GLUCOSE BLDC GLUCOMTR-MCNC: 413 MG/DL — HIGH (ref 70–99)
GLUCOSE SERPL-MCNC: 185 MG/DL — HIGH (ref 70–99)
HCT VFR BLD CALC: 24.1 % — LOW (ref 34.5–45)
HGB BLD-MCNC: 7.7 G/DL — LOW (ref 11.5–15.5)
IMM GRANULOCYTES NFR BLD AUTO: 0.7 % — SIGNIFICANT CHANGE UP (ref 0–0.9)
LYMPHOCYTES # BLD AUTO: 1.59 K/UL — SIGNIFICANT CHANGE UP (ref 1–3.3)
LYMPHOCYTES # BLD AUTO: 28.5 % — SIGNIFICANT CHANGE UP (ref 13–44)
MAGNESIUM SERPL-MCNC: 2.3 MG/DL — SIGNIFICANT CHANGE UP (ref 1.6–2.6)
MCHC RBC-ENTMCNC: 28.7 PG — SIGNIFICANT CHANGE UP (ref 27–34)
MCHC RBC-ENTMCNC: 32 GM/DL — SIGNIFICANT CHANGE UP (ref 32–36)
MCV RBC AUTO: 89.9 FL — SIGNIFICANT CHANGE UP (ref 80–100)
MONOCYTES # BLD AUTO: 0.57 K/UL — SIGNIFICANT CHANGE UP (ref 0–0.9)
MONOCYTES NFR BLD AUTO: 10.2 % — SIGNIFICANT CHANGE UP (ref 2–14)
NEUTROPHILS # BLD AUTO: 3.12 K/UL — SIGNIFICANT CHANGE UP (ref 1.8–7.4)
NEUTROPHILS NFR BLD AUTO: 56.1 % — SIGNIFICANT CHANGE UP (ref 43–77)
NRBC # BLD: 0 /100 WBCS — SIGNIFICANT CHANGE UP (ref 0–0)
PHOSPHATE SERPL-MCNC: 5.7 MG/DL — HIGH (ref 2.5–4.5)
PLATELET # BLD AUTO: 189 K/UL — SIGNIFICANT CHANGE UP (ref 150–400)
POTASSIUM SERPL-MCNC: 4.4 MMOL/L — SIGNIFICANT CHANGE UP (ref 3.5–5.3)
POTASSIUM SERPL-SCNC: 4.4 MMOL/L — SIGNIFICANT CHANGE UP (ref 3.5–5.3)
PROT SERPL-MCNC: 6.4 G/DL — SIGNIFICANT CHANGE UP (ref 6–8.3)
RBC # BLD: 2.68 M/UL — LOW (ref 3.8–5.2)
RBC # FLD: 12.4 % — SIGNIFICANT CHANGE UP (ref 10.3–14.5)
SODIUM SERPL-SCNC: 140 MMOL/L — SIGNIFICANT CHANGE UP (ref 135–145)
WBC # BLD: 5.57 K/UL — SIGNIFICANT CHANGE UP (ref 3.8–10.5)
WBC # FLD AUTO: 5.57 K/UL — SIGNIFICANT CHANGE UP (ref 3.8–10.5)

## 2023-09-24 PROCEDURE — 99233 SBSQ HOSP IP/OBS HIGH 50: CPT | Mod: GC

## 2023-09-24 PROCEDURE — 99233 SBSQ HOSP IP/OBS HIGH 50: CPT

## 2023-09-24 RX ORDER — INSULIN GLARGINE 100 [IU]/ML
17 INJECTION, SOLUTION SUBCUTANEOUS AT BEDTIME
Refills: 0 | Status: DISCONTINUED | OUTPATIENT
Start: 2023-09-24 | End: 2023-09-25

## 2023-09-24 RX ORDER — INSULIN LISPRO 100/ML
6 VIAL (ML) SUBCUTANEOUS
Refills: 0 | Status: DISCONTINUED | OUTPATIENT
Start: 2023-09-24 | End: 2023-09-25

## 2023-09-24 RX ORDER — GABAPENTIN 400 MG/1
200 CAPSULE ORAL THREE TIMES A DAY
Refills: 0 | Status: DISCONTINUED | OUTPATIENT
Start: 2023-09-24 | End: 2023-09-28

## 2023-09-24 RX ADMIN — IRON SUCROSE 110 MILLIGRAM(S): 20 INJECTION, SOLUTION INTRAVENOUS at 17:21

## 2023-09-24 RX ADMIN — Medication 667 MILLIGRAM(S): at 09:15

## 2023-09-24 RX ADMIN — CHLORHEXIDINE GLUCONATE 1 APPLICATION(S): 213 SOLUTION TOPICAL at 05:09

## 2023-09-24 RX ADMIN — Medication 4: at 21:57

## 2023-09-24 RX ADMIN — Medication 100 MILLIGRAM(S): at 21:55

## 2023-09-24 RX ADMIN — Medication 100 MILLIGRAM(S): at 05:07

## 2023-09-24 RX ADMIN — Medication 10 MILLIGRAM(S): at 05:07

## 2023-09-24 RX ADMIN — Medication 2: at 09:12

## 2023-09-24 RX ADMIN — GABAPENTIN 200 MILLIGRAM(S): 400 CAPSULE ORAL at 21:57

## 2023-09-24 RX ADMIN — FAMOTIDINE 20 MILLIGRAM(S): 10 INJECTION INTRAVENOUS at 11:08

## 2023-09-24 RX ADMIN — Medication 4: at 12:42

## 2023-09-24 RX ADMIN — Medication 3 UNIT(S): at 12:42

## 2023-09-24 RX ADMIN — INSULIN GLARGINE 17 UNIT(S): 100 INJECTION, SOLUTION SUBCUTANEOUS at 21:56

## 2023-09-24 RX ADMIN — Medication 667 MILLIGRAM(S): at 11:08

## 2023-09-24 RX ADMIN — Medication 3 UNIT(S): at 09:12

## 2023-09-24 RX ADMIN — Medication 100 MILLIGRAM(S): at 15:15

## 2023-09-24 RX ADMIN — Medication 650 MILLIGRAM(S): at 02:20

## 2023-09-24 RX ADMIN — AMLODIPINE BESYLATE 10 MILLIGRAM(S): 2.5 TABLET ORAL at 05:07

## 2023-09-24 RX ADMIN — Medication 25 MILLIGRAM(S): at 05:07

## 2023-09-24 RX ADMIN — GABAPENTIN 100 MILLIGRAM(S): 400 CAPSULE ORAL at 05:07

## 2023-09-24 RX ADMIN — GABAPENTIN 200 MILLIGRAM(S): 400 CAPSULE ORAL at 15:15

## 2023-09-24 RX ADMIN — Medication 650 MILLIGRAM(S): at 01:54

## 2023-09-24 RX ADMIN — Medication 6 UNIT(S): at 17:22

## 2023-09-24 RX ADMIN — Medication 667 MILLIGRAM(S): at 17:22

## 2023-09-24 RX ADMIN — Medication 3 MILLIGRAM(S): at 21:56

## 2023-09-24 RX ADMIN — ATORVASTATIN CALCIUM 80 MILLIGRAM(S): 80 TABLET, FILM COATED ORAL at 21:57

## 2023-09-24 RX ADMIN — Medication 2: at 17:22

## 2023-09-24 RX ADMIN — TAMSULOSIN HYDROCHLORIDE 0.4 MILLIGRAM(S): 0.4 CAPSULE ORAL at 21:56

## 2023-09-24 NOTE — DISCHARGE NOTE PROVIDER - PROVIDER TOKENS
PROVIDER:[TOKEN:[[id number]],FOLLOWUP:[1 week],ESTABLISHEDPATIENT:[T]] PROVIDER:[TOKEN:[[id number]],FOLLOWUP:[1 week],ESTABLISHEDPATIENT:[T]],PROVIDER:[TOKEN:[[id number]],FOLLOWUP:[2 weeks]]

## 2023-09-24 NOTE — DISCHARGE NOTE PROVIDER - NSDCCPCAREPLAN_GEN_ALL_CORE_FT
PRINCIPAL DISCHARGE DIAGNOSIS  Diagnosis: ESRD needing dialysis  Assessment and Plan of Treatment:       SECONDARY DISCHARGE DIAGNOSES  Diagnosis: Viral infection  Assessment and Plan of Treatment:      PRINCIPAL DISCHARGE DIAGNOSIS  Diagnosis: ESRD needing dialysis  Assessment and Plan of Treatment: You recently came to the hospital because you needed to start hemodialysis (HD). When you came to the hospital this time, you were seen by nephrology, who recommended that you start HD during this admission. You had a temporary HD catheter placed by interventional radiology on 9/21/22, and you had your first session of HD that day. You had a second session on 9/22, and a third session on 9/25. You were transitioned to a more permanent HD catheter the following week. Please continue your HD sessions as scheduled at the outpatient dialysis center.  Please follow up with your outpatient nephrologist (Dr. Garibay) within 2 weeks of discharge from the hospital.      SECONDARY DISCHARGE DIAGNOSES  Diagnosis: Viral infection  Assessment and Plan of Treatment: You came to the hospital because you were having fevers and chills and were very tired. You were found to have a viral infection (enterorhinovirus). You initially required some supplemental oxygen, but were able to be weaned off quickly. You received 3 days of antibiotics (cefepime) as empiric treatment until your blood cultures came back with no bacterial growth.   Please follow up with your outpatient primary care provider within 2 weeks of discharge from the hospital.    Diagnosis: Back pain  Assessment and Plan of Treatment: You had severe lower back pain when you came to the hospital. An x-ray of your lumbar spine was done, which showed no fractures or dislocations. You were treated with pain relief medications.    Diagnosis: Hypocalcemia  Assessment and Plan of Treatment: You were noted to have low calcium in your blood while you were in the hospital, which was likely due to your kidney disease. You were seen by endocrinology, who recommended that you start vitamin D supplementation. Please continue to take the vitamin D supplementation after you are discharged from the hospital.   Please follow up with an outpatient endocrinologist within 2 weeks of discharge from the hospital.     PRINCIPAL DISCHARGE DIAGNOSIS  Diagnosis: ESRD needing dialysis  Assessment and Plan of Treatment: You recently came to the hospital because you needed to start hemodialysis (HD). When you came to the hospital this time, you were seen by nephrology, who recommended that you start HD during this admission. You had a temporary HD catheter placed by interventional radiology on 9/21/22, and you had your first session of HD that day. You had a second session on 9/22, and a third session on 9/25. You were transitioned to a more permanent HD catheter on 9/25/23. Please continue your HD sessions as scheduled at the outpatient dialysis center.  Please follow up with your outpatient nephrologist (Dr. Garibay) within 2 weeks of discharge from the hospital.      SECONDARY DISCHARGE DIAGNOSES  Diagnosis: Viral infection  Assessment and Plan of Treatment: You came to the hospital because you were having fevers and chills and were very tired. You were found to have a viral infection (enterorhinovirus). You initially required some supplemental oxygen, but were able to be weaned off quickly. You received 3 days of antibiotics (cefepime) as empiric treatment until your blood cultures came back with no bacterial growth.   Please follow up with your outpatient primary care provider within 2 weeks of discharge from the hospital.    Diagnosis: Back pain  Assessment and Plan of Treatment: You had severe lower back pain when you came to the hospital. An x-ray of your lumbar spine was done, which showed no fractures or dislocations. You were treated with pain relief medications.    Diagnosis: Hypocalcemia  Assessment and Plan of Treatment: You were noted to have low calcium in your blood while you were in the hospital, which was likely due to your kidney disease. You were seen by endocrinology, who recommended that you start vitamin D supplementation. Please continue to take the vitamin D supplementation after you are discharged from the hospital.   Please follow up with an outpatient endocrinologist within 2 weeks of discharge from the hospital.     PRINCIPAL DISCHARGE DIAGNOSIS  Diagnosis: ESRD needing dialysis  Assessment and Plan of Treatment: You recently came to the hospital because you needed to start hemodialysis (HD). When you came to the hospital this time, you were seen by nephrology, who recommended that you start HD during this admission. You had a temporary HD catheter placed by interventional radiology on 9/21/22, and you had your first session of HD that day. You had a second session on 9/22, and a third session on 9/25. You were transitioned to a more permanent HD catheter on 9/25/23. Please continue your HD sessions as scheduled at the outpatient dialysis center (M, W, F at 7:30pm).  Please follow up with your outpatient nephrologist (Dr. Garibay) within 2 weeks of discharge from the hospital.      SECONDARY DISCHARGE DIAGNOSES  Diagnosis: Type 2 diabetes mellitus  Assessment and Plan of Treatment: You were seen by endocrinology while you were in the hospital. They adjusted your diabetes medications.   Please follow up with your outpatient endocrinologist within 2 weeks of discharge from the hospital.    Diagnosis: Acute UTI  Assessment and Plan of Treatment: You developed a urinary tract infection while in the hospital. You were treated with IV antibiotics (ceftriaxone). You will be discharged with ........ . Please continue to take your antibiotic as prescribed.  Please follow up with your outpatient primary care provider within 2 weeks of discharge from the hospital.    Diagnosis: Viral infection  Assessment and Plan of Treatment: You came to the hospital because you were having fevers and chills and were very tired. You were found to have a viral infection (enterorhinovirus). You initially required some supplemental oxygen, but were able to be weaned off quickly. You received 3 days of antibiotics (cefepime) as empiric treatment until your blood cultures came back with no bacterial growth.   Please follow up with your outpatient primary care provider within 2 weeks of discharge from the hospital.    Diagnosis: Back pain  Assessment and Plan of Treatment: You had severe lower back pain when you came to the hospital. An x-ray of your lumbar spine was done, which showed no fractures or dislocations. You were treated with pain relief medications.    Diagnosis: Hypocalcemia  Assessment and Plan of Treatment: You were noted to have low calcium in your blood while you were in the hospital, which was likely due to your kidney disease. You were seen by endocrinology, who recommended that you start vitamin D supplementation. Please continue to take the vitamin D supplementation after you are discharged from the hospital.   Please follow up with an outpatient endocrinologist within 2 weeks of discharge from the hospital.

## 2023-09-24 NOTE — PROGRESS NOTE ADULT - PROBLEM SELECTOR PLAN 10
- DVT PPx: heparin subq  - Diet: NPO for Shiley placement  - GOC: full code  - Pharmacy: Rx pharmacy on [address]  - Dispo: pending clinical course  - Communication: Pt reports a history of having seizures for 1 day roughly 5 years ago. States that she was taking antiepileptic medication up until last year, when she stopped because she felt well. Does not remember name of medication.  - Will monitor for signs/symptoms of seizure

## 2023-09-24 NOTE — DISCHARGE NOTE PROVIDER - NSDCFUSCHEDAPPT_GEN_ALL_CORE_FT
[facility] Physician [facility]  [address]  Scheduled Appointment: 10/11/2023    Addi Montiel  [facility] Physician [facility]  [address]  Scheduled Appointment: 10/11/2023    Jose M Garibay  [facility]  NEPHRO OP [id number] [facility]  Scheduled Appointment: 11/08/2023    Rosei Fagan  [facility] Physician [facility]  ENDOCRIN [address] [facility] [address]  Scheduled Appointment: 11/28/2023

## 2023-09-24 NOTE — PROGRESS NOTE ADULT - PROBLEM SELECTOR PLAN 3
Pt with a history of anemia in the setting of ESRD. Baseline hemoglobin ~7-8.   - Hb on admission 6.7, received 1u pRBCs  - Iron low, ferritin low  - Will start oral ferrous sulfate Pt w/ hypocalcemia, low PTH, and low vitamin D, likely multifactorial i/s/o ESRD w/ elevated phos, severe vitamin D deficiency, and possible hypoparathyroidism (versus low PTH i/s/o electrolyte abnormalities)  - Endocrinology consulted, appreciate recs  - Repeat PTH elevated Pt w/ hypocalcemia, low PTH, and low vitamin D, likely multifactorial i/s/o ESRD w/ elevated phos, severe vitamin D deficiency, and possible hypoparathyroidism (versus low PTH i/s/o electrolyte abnormalities)  - Endocrinology consulted, appreciate recs  - Repeat PTH elevated, more consistent with secondary hyperparathyroidism in the setting of ESRD  - c/w ergocalciferol 61699 units qweekly

## 2023-09-24 NOTE — PROGRESS NOTE ADULT - ASSESSMENT
53 F with history of ESRD not on HD, poorly controlled Type 2 DM on insulin, HTN, and sarcoidosis, with a recent admission at [facility] (9/6-9/7) for HD initiation but left AMA before HD was started, who is presenting with chills and lower back pain, found to be enterorhinovirus positive on RVP with leukocytosis, as well as anemia and elevated BUN/creatinine, admitted for HD initiation and suspected viral pneumonia. Endocrinology consulted for hypocalcemia and T2D management. (high risk patient with severely uncontrolled diabetes with A1c of 10.4% at high risk of CAD and CVA with high level decision-making).      #Hypocalcemia  #Hyperparathyroidism  #Vitamin D deficiency  - Patient denies any personal or family history of calcium disorders. No hx neck surgery, diuretic use, or the use of bisphosphonates or any other medications for low bone density.   - Hypocalcemia likely multifactorial i/s/o ESRD w/ elevated phos, severe vitamin D deficiency, and possible hypoparathyroidism (versus low PTH i/s/o electrolyte abnormalities)  - currently asymptomatic  - Corrected Ca 7.0 -> 9.4-->9.3--> 9.1 today   - PTH 7, low, repeat 138; 25OH vitamin D 7.5, low; 1-25OH vitamin D  <5, low; phos 10.6 -> 6.7, high  PLAN  - Repeat PTH elevated, more consistent with secondary hyperparathyroidism in the setting of ESRD and severe vitamin D deficiency   - contnue with ergocalciferol 00448 units qweekly  - c/w calcium acetate as ordered  - c/w HD as per nephro    #Uncontrolled Type 2 Diabetes Mellitus   - Hemoglobin A1c: 9.2%  - home regimen:  basaglar 35 units qhs; admelog 10-15 units TIDAC  - patient takes prednisone 10mg daily  - no evidence of DKA on labs  INPATIENT PLAN:  - Fasting glucose elevated, increase Lantus to 17 units QHS   - postprandial glucose elevated, increase admelog to 6 units TIDAC   - Continue with Low dose admelog correction scale TIDQAC and separate low dose scale QHS  - consistent carb diet when eating    DISCHARGE PLANNING:  - Outpatient Medications: basaglar 35 units qhs; admelog 10 units TIDAC; prednisone 10mg daily  - Discharge recs pending clinical course: likely c/w basal/bolus insulin (dose TBD)   - will need Endocrinology follow up. Please provide clinic info in DC paperwork for patient to make an appointment:    #Hypertension  - Goal BP <130/80  - on hydralazine and metoprolol  - Management as per primary team    #Hyperlipidemia  - LDL goal <100  - on atorvastatin    Adina Singleton MD  Attending Physician   Department of Endocrinology, Diabetes and Metabolism   [employer] Teams     If before 9AM or after 5PM, or on weekends/holidays, please call the Endocrine answering service for assistance ([phone]).  For nonurgent matters, please email [email or web address] for assistance.

## 2023-09-24 NOTE — DISCHARGE NOTE PROVIDER - DETAILS OF MALNUTRITION DIAGNOSIS/DIAGNOSES
This patient has been assessed with a concern for Malnutrition and was treated during this hospitalization for the following Nutrition diagnosis/diagnoses:     -  09/21/2023: Moderate protein-calorie malnutrition

## 2023-09-24 NOTE — PROGRESS NOTE ADULT - PROBLEM SELECTOR PLAN 6
Patient with a history of hypertension, on home amlodipine 10mg qd, hydralazine 100mg TID, Toprol XL 25mg qd.   - Will restart patient's home BP regimen  - Will hold patient's home diuretics (bumetanide, metolazone) given ESRD status Pt with a history of type 2 diabetes mellitus requiring insulin at home. Home regimen: Basaglar 35 units qhs with missed doses, Novolog 10 units premeal with missed doses, Trulicity 1.5mg SQ weekly with missed doses,   - A1c 10.4% on 9/6/23  - Endocrinology consulted on prior admission, rec Lantus 28 units qhs and Admelog 7/7/7 TID premeal  - c/w Lantus 14 units qhs given patient's decreased PO intake and current NPO status  - c/w Admelog correctional scale premeal TID and qhs  - FS premeal TID and qhs Pt with a history of type 2 diabetes mellitus requiring insulin at home. Home regimen: Basaglar 35 units qhs with missed doses, Novolog 10 units premeal with missed doses, Trulicity 1.5mg SQ weekly with missed doses,   - A1c 10.4% on 9/6/23  - Endocrinology consulted on prior admission, rec Lantus 28 units qhs and Admelog 7/7/7 TID premeal  - c/w Lantus 14 units qhs   - start Admelog 3 units premeal TID and qhs  - c/w Admelog correctional scale premeal TID and qhs  - FS premeal TID and qhs Pt with a history of type 2 diabetes mellitus requiring insulin at home. Home regimen: Basaglar 35 units qhs with missed doses, Novolog 10 units premeal with missed doses, Trulicity 1.5mg SQ weekly with missed doses,   - A1c 10.4% on 9/6/23  - Endocrinology consulted on prior admission, rec Lantus 28 units qhs and Admelog 7/7/7 TID premeal  - c/w Lantus 14 units qhs   - start Admelog 3 units premeal TID and qhs  - c/w Admelog correctional scale premeal TID and qhs  - FS premeal TID and qhs  - increase gabapentin to 200mg TID for neuropathic pain

## 2023-09-24 NOTE — PROGRESS NOTE ADULT - ASSESSMENT
Patient is a 52 y/o F w/ a PMHx of ESRD not on HD, poorly controlled Type 2 DM on insulin, HTN, and sarcoidosis, with a recent admission at [facility] (9/6-9/7) for HD initiation but left AMA before HD was started, who is presenting with chills and lower back pain, found to be enterorhinovirus positive on RVP with leukocytosis, as well as anemia and elevated BUN/creatinine, admitted for HD initiation and suspected viral pneumonia.

## 2023-09-24 NOTE — DISCHARGE NOTE PROVIDER - HOSPITAL COURSE
When received this patient  From previous shift nurse, Pt's diet NPO with SLP consult ordered, and PT and INR increased, later admission orders showed up in Epic, diet changed to regular diet, and coumadin 4mg ordered with other medicine, [facility] called concerning about diet, and medicine, new order noted, bedside dysphagia screening done, If result were pass, all medicines can be given, and coumadin 4mg withheld tonight, bedside swallowing screening done by this nurse, result showed Pass, all medicines given without any difficulty of swallowing  Pills, Pt. Said I am okay swallowing medicines , and Pt. Wanted to eat some snacks, snacks provided, Pt. Ate some of snacks provided   HPI:   Patient is a 52 y/o F w/ a PMHx of ESRD not on HD, poorly controlled Type 2 DM on insulin, HTN, and sarcoidosis, with a recent admission at [facility] (9/6-9/7) for HD initiation but left AMA before HD was started, who is presenting with chills and lower back pain. Patient states that she has had worsening chills since Friday and this morning woke up with 10/10 back pain. Patient's daughter states that the patient has been more tired in the past week since her prior AMA from [facility]. Denies any shortness of breath, cough, chest pain. Denies any sick contacts or recent travel. Patient also endorses worsening leg swelling since leaving [facility]. Patient denies any SOB, chest pain, abdominal pain, nausea/vomiting, diarrhea, or urinary symptoms.  Patient previously presented to the [facility] ED for initiation of dialysis on 09/06/2023 with creatinine of 9 and elevated potassium, also with fatigue and loss of appetite. At that time she had a fingerstick glucose in the 660s stating that she did not take her insulin or Lantus for > 2 days 2/2 neuropathic pain. She was found to have anion gap metabolic acidosis (bicarb 18, VBG pH 7.33) and hyperkalemia. MICU at that time deemed that she was not in need of emergent dialysis. Patient signed out AMA on 09/07/2023 due to frustrations with delays in the process of getting a Shiley placed for HD.    Hospital Course:   In the ED, patient was febrile (Tmax 102.9), tachycardic (102), hypertensive (174/76mmHg), requiring 2L NC after desatting to 88% on RA. Labs significant for leukocytosis (WBC 14.78), anemia (Hb 6.7), elevated BUN (144) and creatinine (10.92), hypocalcemia (7.4). UA was wnl. RVP positive for enterorhinovirus. CXR w/ bibasilar hazy opacities.       Important Changes and Follow Ups:  - Continue with outpatient dialysis schedule  - Follow up with your outpatient primary care provider within 2 weeks of discharge from the hospital.  - Follow up with your outpatient nephrologist (Dr. Garibay) within 2 weeks of discharge from the hospital. HPI:   Patient is a 52 y/o F w/ a PMHx of ESRD not on HD, poorly controlled Type 2 DM on insulin, HTN, and sarcoidosis, with a recent admission at [facility] (9/6-9/7) for HD initiation but left AMA before HD was started, who is presenting with chills and lower back pain. Patient states that she has had worsening chills since Friday and this morning woke up with 10/10 back pain. Patient's daughter states that the patient has been more tired in the past week since her prior AMA from [facility]. Denies any shortness of breath, cough, chest pain. Denies any sick contacts or recent travel. Patient also endorses worsening leg swelling since leaving [facility]. Patient denies any SOB, chest pain, abdominal pain, nausea/vomiting, diarrhea, or urinary symptoms.  Patient previously presented to the [facility] ED for initiation of dialysis on 09/06/2023 with creatinine of 9 and elevated potassium, also with fatigue and loss of appetite. At that time she had a fingerstick glucose in the 660s stating that she did not take her insulin or Lantus for > 2 days 2/2 neuropathic pain. She was found to have anion gap metabolic acidosis (bicarb 18, VBG pH 7.33) and hyperkalemia. MICU at that time deemed that she was not in need of emergent dialysis. Patient signed out AMA on 09/07/2023 due to frustrations with delays in the process of getting a Shiley placed for HD.    Hospital Course:   In the ED, patient was febrile (Tmax 102.9), tachycardic (102), hypertensive (174/76mmHg), requiring 2L NC after desatting to 88% on RA. Labs significant for leukocytosis (WBC 14.78), anemia (Hb 6.7), elevated BUN (144) and creatinine (10.92), hypocalcemia (7.4). UA was wnl. RVP positive for enterorhinovirus. CXR w/ bibasilar hazy opacities. Patient met sepsis criteria on admission, and was treated empirically with cefepime until blood cultures came back with no growth after 48 hours. A repeat CXR was done, which showed no focal consolidation and a trace left pleural effusion. Nephrology and IR were consulted for HD initiation and R Shiley placement. The patient requested anesthesia for sedation, so the R IJ Shiley was placed with conscious sedation on 9/21 and HD was initiated. Patient had a second HD session on 9/22. Patient was transitioned to a Permacath on .......... .  Patient also noted to have urinary retention on admission, so a De Los Santos was attempted but could not be placed due to difficult anatomy. Urology was consulted, who placed the 14F coude catheter. De Los Santos was removed on ....... and patient was able to void freely.  Patient's course complicated by hypocalcemia, with low PTH and low vitamin D. Endocrinology was consulted, who recommended repeating the PTH level, which then came back as mildly elevated, more consistent with hypocalcemia secondary to renal dysfunction. Patient was started on vitamin D supplementation.     Patient is currently hemodynamically stable and no longer requires inpatient level of care. Patient should follow up with their outpatient primary care provider.      Important Changes and Follow Ups:  - Continue with outpatient dialysis schedule  - Follow up with your outpatient primary care provider within 2 weeks of discharge from the hospital.  - Follow up with your outpatient nephrologist (Dr. Garibay) within 2 weeks of discharge from the hospital.  - Follow up with outpatient endocrinology within 2 weeks of discharge from the hospital. HPI:   Patient is a 52 y/o F w/ a PMHx of ESRD not on HD, poorly controlled Type 2 DM on insulin, HTN, and sarcoidosis, with a recent admission at [facility] (9/6-9/7) for HD initiation but left AMA before HD was started, who is presenting with chills and lower back pain. Patient states that she has had worsening chills since Friday and this morning woke up with 10/10 back pain. Patient's daughter states that the patient has been more tired in the past week since her prior AMA from [facility]. Denies any shortness of breath, cough, chest pain. Denies any sick contacts or recent travel. Patient also endorses worsening leg swelling since leaving [facility]. Patient denies any SOB, chest pain, abdominal pain, nausea/vomiting, diarrhea, or urinary symptoms.  Patient previously presented to the [facility] ED for initiation of dialysis on 09/06/2023 with creatinine of 9 and elevated potassium, also with fatigue and loss of appetite. At that time she had a fingerstick glucose in the 660s stating that she did not take her insulin or Lantus for > 2 days 2/2 neuropathic pain. She was found to have anion gap metabolic acidosis (bicarb 18, VBG pH 7.33) and hyperkalemia. MICU at that time deemed that she was not in need of emergent dialysis. Patient signed out AMA on 09/07/2023 due to frustrations with delays in the process of getting a Shiley placed for HD.    Hospital Course:   In the ED, patient was febrile (Tmax 102.9), tachycardic (102), hypertensive (174/76mmHg), requiring 2L NC after desatting to 88% on RA. Labs significant for leukocytosis (WBC 14.78), anemia (Hb 6.7), elevated BUN (144) and creatinine (10.92), hypocalcemia (7.4). UA was wnl. RVP positive for enterorhinovirus. CXR w/ bibasilar hazy opacities. Patient met sepsis criteria on admission, and was treated empirically with cefepime until blood cultures came back with no growth after 48 hours. A repeat CXR was done, which showed no focal consolidation and a trace left pleural effusion. Nephrology and IR were consulted for HD initiation and R Shiley placement. The patient requested anesthesia for sedation, so the R IJ Shiley was placed with conscious sedation on 9/21 and HD was initiated. Patient had a second HD session on 9/22. Patient was transitioned to a Permacath on 09/26/23, followed by a session of HD.  Patient also noted to have urinary retention on admission, so a De Los Santos was attempted but could not be placed due to difficult anatomy. Urology was consulted, who placed the 14F coude catheter. De Los Santos was removed on 09/25/23 and patient was able to void freely.  Patient's course complicated by hypocalcemia, with low PTH and low vitamin D. Endocrinology was consulted, who recommended repeating the PTH level, which then came back as mildly elevated, more consistent with hypocalcemia secondary to renal dysfunction. Patient was started on vitamin D supplementation.     Patient is currently hemodynamically stable and no longer requires inpatient level of care. Patient should follow up with their outpatient primary care provider.      Important Changes and Follow Ups:  - Continue with outpatient dialysis schedule  - Follow up with your outpatient primary care provider within 2 weeks of discharge from the hospital.  - Follow up with your outpatient nephrologist (Dr. Garibay) within 2 weeks of discharge from the hospital.  - Follow up with outpatient endocrinology within 2 weeks of discharge from the hospital. HPI:   Patient is a 52 y/o F w/ a PMHx of ESRD not on HD, poorly controlled Type 2 DM on insulin, HTN, and sarcoidosis, with a recent admission at [facility] (9/6-9/7) for HD initiation but left AMA before HD was started, who is presenting with chills and lower back pain. Patient states that she has had worsening chills since Friday and this morning woke up with 10/10 back pain. Patient's daughter states that the patient has been more tired in the past week since her prior AMA from [facility]. Denies any shortness of breath, cough, chest pain. Denies any sick contacts or recent travel. Patient also endorses worsening leg swelling since leaving [facility]. Patient denies any SOB, chest pain, abdominal pain, nausea/vomiting, diarrhea, or urinary symptoms.  Patient previously presented to the [facility] ED for initiation of dialysis on 09/06/2023 with creatinine of 9 and elevated potassium, also with fatigue and loss of appetite. At that time she had a fingerstick glucose in the 660s stating that she did not take her insulin or Lantus for > 2 days 2/2 neuropathic pain. She was found to have anion gap metabolic acidosis (bicarb 18, VBG pH 7.33) and hyperkalemia. MICU at that time deemed that she was not in need of emergent dialysis. Patient signed out AMA on 09/07/2023 due to frustrations with delays in the process of getting a Shiley placed for HD.    Hospital Course:   In the ED, patient was febrile (Tmax 102.9), tachycardic (102), hypertensive (174/76mmHg), requiring 2L NC after desatting to 88% on RA. Labs significant for leukocytosis (WBC 14.78), anemia (Hb 6.7), elevated BUN (144) and creatinine (10.92), hypocalcemia (7.4). UA was wnl. RVP positive for enterorhinovirus. CXR w/ bibasilar hazy opacities. Patient met sepsis criteria on admission, and was treated empirically with cefepime until blood cultures came back with no growth after 48 hours. A repeat CXR was done, which showed no focal consolidation and a trace left pleural effusion. Nephrology and IR were consulted for HD initiation and R Shiley placement. The patient requested anesthesia for sedation, so the R IJ Shiley was placed with conscious sedation on 9/21 and HD was initiated. Patient had a second HD session on 9/22. Patient was transitioned to a Permacath on 09/26/23, followed by a session of HD.  Patient also noted to have urinary retention on admission, so a De Los Santos was attempted but could not be placed due to difficult anatomy. Urology was consulted, who placed the 14F coude catheter. De Los Santos was removed on 09/25/23 and patient was able to void freely. Patient's course complicated by UTI after De Los Santos removal, started on ceftriaxone 1g daily. Urine culture came back with E. coli, sensitivities pending.   Patient's course complicated by hypocalcemia, with low PTH and low vitamin D. Endocrinology was consulted, who recommended repeating the PTH level, which then came back as mildly elevated, more consistent with hypocalcemia secondary to renal dysfunction. Patient was started on vitamin D supplementation. Endocrinology also adjusted the patient's insulin regimen while in the hospital. Patient will be discharged on ......... .    Patient is currently hemodynamically stable and no longer requires inpatient level of care. Patient should follow up with their outpatient primary care provider, endocrinologist, and nephrologist.       Important Changes and Follow Ups:  - Continue with antibiotics for a full 7 day course (ends 10/3/23)  - Continue with outpatient dialysis schedule (M, W, F at 19:30)  - Follow up with your outpatient primary care provider within 2 weeks of discharge from the hospital.  - Follow up with your outpatient nephrologist (Dr. Jose M Garibay) within 2 weeks of discharge from the hospital.  - Follow up with outpatient endocrinology within 2 weeks of discharge from the hospital. HPI:   Patient is a 54 y/o F w/ a PMHx of ESRD not on HD, poorly controlled Type 2 DM on insulin, HTN, and sarcoidosis, with a recent admission at [facility] (9/6-9/7) for HD initiation but left AMA before HD was started, who is presenting with chills and lower back pain. Patient states that she has had worsening chills since Friday and this morning woke up with 10/10 back pain. Patient's daughter states that the patient has been more tired in the past week since her prior AMA from [facility]. Denies any shortness of breath, cough, chest pain. Denies any sick contacts or recent travel. Patient also endorses worsening leg swelling since leaving [facility]. Patient denies any SOB, chest pain, abdominal pain, nausea/vomiting, diarrhea, or urinary symptoms.  Patient previously presented to the [facility] ED for initiation of dialysis on 09/06/2023 with creatinine of 9 and elevated potassium, also with fatigue and loss of appetite. At that time she had a fingerstick glucose in the 660s stating that she did not take her insulin or Lantus for > 2 days 2/2 neuropathic pain. She was found to have anion gap metabolic acidosis (bicarb 18, VBG pH 7.33) and hyperkalemia. MICU at that time deemed that she was not in need of emergent dialysis. Patient signed out AMA on 09/07/2023 due to frustrations with delays in the process of getting a Shiley placed for HD.    Hospital Course:   In the ED, patient was febrile (Tmax 102.9), tachycardic (102), hypertensive (174/76mmHg), requiring 2L NC after desatting to 88% on RA. Labs significant for leukocytosis (WBC 14.78), anemia (Hb 6.7), elevated BUN (144) and creatinine (10.92), hypocalcemia (7.4). UA was wnl. RVP positive for enterorhinovirus. CXR w/ bibasilar hazy opacities. Patient met sepsis criteria on admission, and was treated empirically with cefepime until blood cultures came back with no growth after 48 hours. A repeat CXR was done, which showed no focal consolidation and a trace left pleural effusion. Nephrology and IR were consulted for HD initiation and R Shiley placement. The patient requested anesthesia for sedation, so the R IJ Shiley was placed with conscious sedation on 9/21 and HD was initiated. Patient had a second HD session on 9/22. Patient was transitioned to a Permacath on 09/26/23, followed by a session of HD.  Patient also noted to have urinary retention on admission, so a De Los Santos was attempted but could not be placed due to difficult anatomy. Urology was consulted, who placed the 14F coude catheter. De Los Santos was removed on 09/25/23 and patient was able to void freely. Patient's course complicated by UTI after De Los Santos removal, started on ceftriaxone 1g daily. Urine culture came back with E. coli, sensitivities pending.   Patient's course complicated by hypocalcemia, with low PTH and low vitamin D. Endocrinology was consulted, who recommended repeating the PTH level, which then came back as mildly elevated, more consistent with hypocalcemia secondary to renal dysfunction. Patient was started on vitamin D supplementation. Endocrinology also adjusted the patient's insulin regimen while in the hospital. Patient will be discharged on 16U lantus and 10U premeal.     Patient is currently hemodynamically stable and no longer requires inpatient level of care. Patient should follow up with their outpatient primary care provider, endocrinologist, and nephrologist.       Important Changes and Follow Ups:  - Continue with antibiotics for a full 7 day course (ends 10/3/23)  - Continue with outpatient dialysis schedule (M, W, F at 19:30)  - Stop taking bumex and metolazone  - Follow up with your outpatient primary care provider within 2 weeks of discharge from the hospital.  - Follow up with your outpatient nephrologist (Dr. Jose M Garibay) within 2 weeks of discharge from the hospital.  - Follow up with outpatient endocrinology within 2 weeks of discharge from the hospital.

## 2023-09-24 NOTE — PROGRESS NOTE ADULT - PROBLEM SELECTOR PLAN 5
Pt with a history of type 2 diabetes mellitus requiring insulin at home. Home regimen: Basaglar 35 units qhs with missed doses, Novolog 10 units premeal with missed doses, Trulicity 1.5mg SQ weekly with missed doses,   - A1c 10.4% on 9/6/23  - Endocrinology consulted on prior admission, rec Lantus 28 units qhs and Admelog 7/7/7 TID premeal  - c/w Lantus 14 units qhs given patient's decreased PO intake and current NPO status  - c/w Admelog correctional scale premeal TID and qhs  - FS premeal TID and qhs Pt endorsing new onset 10/10 back pain starting yesterday, with focal spinal tenderness on palpation of lumbar spine (L2-L4).  - Xray lumbar spine w/ no fractures/dislocations  - Pain control as needed

## 2023-09-24 NOTE — PROGRESS NOTE ADULT - PROBLEM SELECTOR PLAN 8
Patient w/ a history of sarcoidosis, takes prednisone 10mg qd at home.  - c/w prednisone 10mg qd Pt w/ a history of HLD, on home rosuvastatin 40mg qd  - c/w atorvastatin 40mg qhs

## 2023-09-24 NOTE — PROGRESS NOTE ADULT - PROBLEM SELECTOR PLAN 4
Pt endorsing new onset 10/10 back pain starting yesterday, with focal spinal tenderness on palpation of lumbar spine (L2-L4).  - Xray lumbar spine w/ no fractures/dislocations  - Pain control as needed Pt with a history of anemia in the setting of ESRD. Baseline hemoglobin ~7-8.   - Hb on admission 6.7, received 1u pRBCs  - Iron low, ferritin low  - Will start oral ferrous sulfate

## 2023-09-24 NOTE — DISCHARGE NOTE PROVIDER - NSFOLLOWUPCLINICS_GEN_ALL_ED_FT
[facility]  Endocrinology  [address] [place] [address]  Phone: [phone]  Fax:

## 2023-09-24 NOTE — DISCHARGE NOTE PROVIDER - NSDCMRMEDTOKEN_GEN_ALL_CORE_FT
albuterol 90 mcg/inh inhalation aerosol: 2 puff(s) inhaled every 4 hours, As Needed  amLODIPine 10 mg oral tablet: 1 tab(s) orally once a day  Basaglar KwikPen 100 units/mL subcutaneous solution: 35 unit(s) subcutaneous once a day (at bedtime)  bumetanide 2 mg oral tablet: 1 tab(s) orally 3 times a day  famotidine 20 mg oral tablet: 1 tab(s) orally once a day (at bedtime)  ferrous sulfate 325 mg (65 mg elemental iron) oral delayed release tablet: 1 tab(s) orally once a day  gabapentin 100 mg oral capsule: 1 cap(s) orally 3 times a day  hydrALAZINE 100 mg oral tablet: 1 tab(s) orally 3 times a day  metOLazone 2.5 mg oral tablet: 1 tab(s) orally once a day  metOLazone 5 mg oral tablet: 1 tab(s) orally once a day  metoprolol succinate 25 mg oral tablet, extended release: 1 tab(s) orally once a day  NovoLOG 100 units/mL injectable solution: 10 unit(s) injectable 3 times a day   predniSONE 10 mg oral tablet: 1 tab(s) orally once a day  rosuvastatin 40 mg oral tablet: 1 tab(s) orally once a day  sevelamer carbonate 800 mg oral tablet: 2 tab(s) orally 3 times a day (with meals)  sodium bicarbonate 650 mg oral tablet: 1 tab(s) orally 3 times a day  Trulicity Pen 0.75 mg/0.5 mL subcutaneous solution: 0.75 milligram(s) subcutaneously once a week (Patient reports last dose ~1.5 weeks ago - unsure which day).   albuterol 90 mcg/inh inhalation aerosol: 2 puff(s) inhaled every 4 hours, As Needed  amLODIPine 10 mg oral tablet: 1 tab(s) orally once a day  Basaglar KwikPen 100 units/mL subcutaneous solution: 35 unit(s) subcutaneous once a day (at bedtime)  bumetanide 2 mg oral tablet: 1 tab(s) orally 3 times a day  famotidine 20 mg oral tablet: 1 tab(s) orally once a day (at bedtime)  ferrous sulfate 325 mg (65 mg elemental iron) oral delayed release tablet: 1 tab(s) orally once a day  hydrALAZINE 100 mg oral tablet: 1 tab(s) orally 3 times a day  lactobacillus acidophilus oral capsule: 1 tab(s) orally once a day  metOLazone 2.5 mg oral tablet: 1 tab(s) orally once a day  metOLazone 5 mg oral tablet: 1 tab(s) orally once a day  metoprolol succinate 25 mg oral tablet, extended release: 1 tab(s) orally once a day  Neurontin 100 mg oral capsule: 2 cap(s) orally 3 times a day  NovoLOG 100 units/mL injectable solution: 10 unit(s) injectable 3 times a day   predniSONE 10 mg oral tablet: 1 tab(s) orally once a day  rosuvastatin 40 mg oral tablet: 1 tab(s) orally once a day  sevelamer carbonate 800 mg oral tablet: 2 tab(s) orally 3 times a day (with meals)  sodium bicarbonate 650 mg oral tablet: 1 tab(s) orally 3 times a day  tamsulosin 0.4 mg oral capsule: 1 cap(s) orally once a day (at bedtime)  Trulicity Pen 0.75 mg/0.5 mL subcutaneous solution: 0.75 milligram(s) subcutaneously once a week (Patient reports last dose ~1.5 weeks ago - unsure which day).  Vitamin D2 1.25 mg (50,000 intl units) oral capsule: 1 cap(s) orally once a week   albuterol 90 mcg/inh inhalation aerosol: 2 puff(s) inhaled every 4 hours, As Needed  amLODIPine 10 mg oral tablet: 1 tab(s) orally once a day  calcium acetate 667 mg oral tablet: 3 tab(s) orally 3 times a day (with meals)  cefuroxime 250 mg oral tablet: 1 tab(s) orally 2 times a day until 10/3  epoetin suzette: 4,000 unit(s) injectable 3 times a week Administer with HD  famotidine 20 mg oral tablet: 1 tab(s) orally once a day (at bedtime)  ferrous sulfate 325 mg (65 mg elemental iron) oral delayed release tablet: 1 tab(s) orally once a day  hydrALAZINE 100 mg oral tablet: 1 tab(s) orally 3 times a day  insulin glargine 100 units/mL subcutaneous solution: 16 unit(s) subcutaneous once a day (at bedtime)  lactobacillus acidophilus oral capsule: 1 tab(s) orally once a day  metoprolol succinate 25 mg oral tablet, extended release: 1 tab(s) orally once a day  Neurontin 100 mg oral capsule: 2 cap(s) orally 3 times a day  NovoLOG 100 units/mL injectable solution: 10 unit(s) injectable 3 times a day  predniSONE 10 mg oral tablet: 1 tab(s) orally once a day  rosuvastatin 40 mg oral tablet: 1 tab(s) orally once a day  sodium bicarbonate 650 mg oral tablet: 1 tab(s) orally 3 times a day  tamsulosin 0.4 mg oral capsule: 1 cap(s) orally once a day (at bedtime)  Trulicity Pen 0.75 mg/0.5 mL subcutaneous solution: 0.75 milligram(s) subcutaneously once a week (Patient reports last dose ~1.5 weeks ago - unsure which day).  Vitamin D2 1.25 mg (50,000 intl units) oral capsule: 1 cap(s) orally once a week

## 2023-09-24 NOTE — PROGRESS NOTE ADULT - ATTENDING COMMENTS
-Can increase gabapentin to help with LE neuropathy now that patient on HD.   -F/u renal recs for HD.   -Would consult IR tomorrow for permacath placement with light sedation as prior (patient request).   -C/w flomax nightly. Bowel regimen. Tentative plan for TOV tomorrow.   -Endo recs for DM mgmt appreciated. C/w ergocalciferol for Vitamin D deficiency.   -D/w CM regarding initiating outpatient HD discussions with patient. -F/u with them re: outpt HD center requirements for DCP. -Hep B panel and Quantiferon in am.

## 2023-09-24 NOTE — PROGRESS NOTE ADULT - SUBJECTIVE AND OBJECTIVE BOX
PROGRESS NOTE:   Patient is a 53y old  Female who presents with a chief complaint of Chills (23 Sep 2023 23:03)    SUBJECTIVE / OVERNIGHT EVENTS:  No acute events overnight.      MEDICATIONS  (STANDING):  amLODIPine   Tablet 10 milliGRAM(s) Oral daily  atorvastatin 80 milliGRAM(s) Oral at bedtime  calcium acetate 667 milliGRAM(s) Oral three times a day with meals  chlorhexidine 4% Liquid 1 Application(s) Topical <User Schedule>  dextrose 5%. 1000 milliLiter(s) (50 mL/Hr) IV Continuous <Continuous>  dextrose 5%. 1000 milliLiter(s) (100 mL/Hr) IV Continuous <Continuous>  dextrose 50% Injectable 25 Gram(s) IV Push once  dextrose 50% Injectable 25 Gram(s) IV Push once  dextrose 50% Injectable 12.5 Gram(s) IV Push once  epoetin suzette-epbx (RETACRIT) Injectable 4000 Unit(s) IV Push <User Schedule>  ergocalciferol 15552 Unit(s) Oral every week  famotidine    Tablet 20 milliGRAM(s) Oral daily  gabapentin 100 milliGRAM(s) Oral three times a day  glucagon  Injectable 1 milliGRAM(s) IntraMuscular once  hydrALAZINE 100 milliGRAM(s) Oral three times a day  influenza   Vaccine 0.5 milliLiter(s) IntraMuscular once  insulin glargine Injectable (LANTUS) 14 Unit(s) SubCutaneous at bedtime  insulin lispro (ADMELOG) corrective regimen sliding scale   SubCutaneous at bedtime  insulin lispro (ADMELOG) corrective regimen sliding scale   SubCutaneous three times a day before meals  insulin lispro Injectable (ADMELOG) 3 Unit(s) SubCutaneous three times a day before meals  iron sucrose IVPB 200 milliGRAM(s) IV Intermittent every 24 hours  melatonin 3 milliGRAM(s) Oral at bedtime  metoprolol succinate ER 25 milliGRAM(s) Oral daily  predniSONE   Tablet 10 milliGRAM(s) Oral daily  senna 2 Tablet(s) Oral at bedtime  tamsulosin 0.4 milliGRAM(s) Oral at bedtime    MEDICATIONS  (PRN):  acetaminophen     Tablet .. 650 milliGRAM(s) Oral every 6 hours PRN Mild Pain (1 - 3)  dextrose Oral Gel 15 Gram(s) Oral once PRN Blood Glucose LESS THAN 70 milliGRAM(s)/deciliter  sodium chloride 0.9% lock flush 10 milliLiter(s) IV Push every 1 hour PRN Pre/post blood products, medications, blood draw, and to maintain line patency      CAPILLARY BLOOD GLUCOSE  POCT Blood Glucose.: 239 mg/dL (23 Sep 2023 21:45)  POCT Blood Glucose.: 198 mg/dL (23 Sep 2023 17:32)  POCT Blood Glucose.: 266 mg/dL (23 Sep 2023 13:02)  POCT Blood Glucose.: 151 mg/dL (23 Sep 2023 08:34)    I&O's Summary  23 Sep 2023 07:01  -  24 Sep 2023 07:00  --------------------------------------------------------  IN: 240 mL / OUT: 0 mL / NET: 240 mL      PHYSICAL EXAM:  Vital Signs Last 24 Hrs  T(C): 36.6 (24 Sep 2023 04:35), Max: 37 (23 Sep 2023 20:35)  T(F): 97.9 (24 Sep 2023 04:35), Max: 98.6 (23 Sep 2023 20:35)  HR: 92 (24 Sep 2023 04:35) (85 - 99)  BP: 134/68 (24 Sep 2023 04:35) (115/72 - 154/73)  BP(mean): --  RR: 18 (24 Sep 2023 04:35) (18 - 18)  SpO2: 100% (24 Sep 2023 04:35) (99% - 100%)  Parameters below as of 24 Sep 2023 04:35  Patient On (Oxygen Delivery Method): room air    CONSTITUTIONAL: NAD, well-developed  HEENT: PERRLA, EOMI, moist mucous membranes.  NECK: Supple. No JVD. +R IJ Shiley, site appears c/d/i.  RESPIRATORY: Normal respiratory effort, Lungs CTAB  CARDIOVASCULAR: Regular rate and rhythm with normal S1 and S2. No murmurs.  ABDOMEN: Soft, non-tender, non-distended. Normoactive bowel sounds, no rebound/guarding; No hepatosplenomegaly  EXTREMITIES: 2+ peripheral pulses. No clubbing, cyanosis, or edema.  MUSCULOSKELETAL: No joint swelling or tenderness to palpation  SKIN: No rashes or lesions  NEUROLOGIC: A&Ox3. No focal deficits.  PSYCH: Affect appropriate      LABS:                        7.6    5.55  )-----------( 173      ( 23 Sep 2023 06:58 )             23.1     09-23  141  |  100  |  41<H>  ----------------------------<  87  3.8   |  26  |  4.87<H>    Ca    8.5      23 Sep 2023 06:58  Phos  5.4     09-23  Mg     2.2     09-23    TPro  6.0  /  Alb  3.0<L>  /  TBili  0.1<L>  /  DBili  x   /  AST  12  /  ALT  8<L>  /  AlkPhos  39<L>  09-23    Urinalysis Basic - ( 23 Sep 2023 06:58 )  Color: x / Appearance: x / SG: x / pH: x  Gluc: 87 mg/dL / Ketone: x  / Bili: x / Urobili: x   Blood: x / Protein: x / Nitrite: x   Leuk Esterase: x / RBC: x / WBC x   Sq Epi: x / Non Sq Epi: x / Bacteria: x        RADIOLOGY & ADDITIONAL TESTS:  Results Reviewed:   Imaging Personally Reviewed:  Electrocardiogram Personally Reviewed:    COORDINATION OF CARE:  Care Discussed with Consultants/Other Providers [Y/N]:  Prior or Outpatient Records Reviewed [Y/N]: PROGRESS NOTE:   Patient is a 53y old  Female who presents with a chief complaint of Chills (23 Sep 2023 23:03)    SUBJECTIVE / OVERNIGHT EVENTS:  No acute events overnight. Patient states that she was unable to sleep well because the room is too noisy at night. Endorses continuing neuropathic leg pain. Would like to have the [place] removed. Patient denies any fever, chills, SOB, chest pain, abdominal pain, nausea/vomiting, diarrhea, or urinary symptoms.      MEDICATIONS  (STANDING):  amLODIPine   Tablet 10 milliGRAM(s) Oral daily  atorvastatin 80 milliGRAM(s) Oral at bedtime  calcium acetate 667 milliGRAM(s) Oral three times a day with meals  chlorhexidine 4% Liquid 1 Application(s) Topical <User Schedule>  dextrose 5%. 1000 milliLiter(s) (50 mL/Hr) IV Continuous <Continuous>  dextrose 5%. 1000 milliLiter(s) (100 mL/Hr) IV Continuous <Continuous>  dextrose 50% Injectable 25 Gram(s) IV Push once  dextrose 50% Injectable 25 Gram(s) IV Push once  dextrose 50% Injectable 12.5 Gram(s) IV Push once  epoetin suzette-epbx (RETACRIT) Injectable 4000 Unit(s) IV Push <User Schedule>  ergocalciferol 02061 Unit(s) Oral every week  famotidine    Tablet 20 milliGRAM(s) Oral daily  gabapentin 100 milliGRAM(s) Oral three times a day  glucagon  Injectable 1 milliGRAM(s) IntraMuscular once  hydrALAZINE 100 milliGRAM(s) Oral three times a day  influenza   Vaccine 0.5 milliLiter(s) IntraMuscular once  insulin glargine Injectable (LANTUS) 14 Unit(s) SubCutaneous at bedtime  insulin lispro (ADMELOG) corrective regimen sliding scale   SubCutaneous at bedtime  insulin lispro (ADMELOG) corrective regimen sliding scale   SubCutaneous three times a day before meals  insulin lispro Injectable (ADMELOG) 3 Unit(s) SubCutaneous three times a day before meals  iron sucrose IVPB 200 milliGRAM(s) IV Intermittent every 24 hours  melatonin 3 milliGRAM(s) Oral at bedtime  metoprolol succinate ER 25 milliGRAM(s) Oral daily  predniSONE   Tablet 10 milliGRAM(s) Oral daily  senna 2 Tablet(s) Oral at bedtime  tamsulosin 0.4 milliGRAM(s) Oral at bedtime    MEDICATIONS  (PRN):  acetaminophen     Tablet .. 650 milliGRAM(s) Oral every 6 hours PRN Mild Pain (1 - 3)  dextrose Oral Gel 15 Gram(s) Oral once PRN Blood Glucose LESS THAN 70 milliGRAM(s)/deciliter  sodium chloride 0.9% lock flush 10 milliLiter(s) IV Push every 1 hour PRN Pre/post blood products, medications, blood draw, and to maintain line patency      CAPILLARY BLOOD GLUCOSE  POCT Blood Glucose.: 239 mg/dL (23 Sep 2023 21:45)  POCT Blood Glucose.: 198 mg/dL (23 Sep 2023 17:32)  POCT Blood Glucose.: 266 mg/dL (23 Sep 2023 13:02)  POCT Blood Glucose.: 151 mg/dL (23 Sep 2023 08:34)    I&O's Summary  23 Sep 2023 07:01  -  24 Sep 2023 07:00  --------------------------------------------------------  IN: 240 mL / OUT: 0 mL / NET: 240 mL      PHYSICAL EXAM:  Vital Signs Last 24 Hrs  T(C): 36.6 (24 Sep 2023 04:35), Max: 37 (23 Sep 2023 20:35)  T(F): 97.9 (24 Sep 2023 04:35), Max: 98.6 (23 Sep 2023 20:35)  HR: 92 (24 Sep 2023 04:35) (85 - 99)  BP: 134/68 (24 Sep 2023 04:35) (115/72 - 154/73)  BP(mean): --  RR: 18 (24 Sep 2023 04:35) (18 - 18)  SpO2: 100% (24 Sep 2023 04:35) (99% - 100%)  Parameters below as of 24 Sep 2023 04:35  Patient On (Oxygen Delivery Method): room air    CONSTITUTIONAL: NAD, well-developed  HEENT: PERRLA, EOMI, moist mucous membranes.  NECK: Supple. No JVD. +R IJ Shiley, site appears c/d/i.  RESPIRATORY: Normal respiratory effort, Lungs CTAB  CARDIOVASCULAR: Regular rate and rhythm with normal S1 and S2. No murmurs.  ABDOMEN: Soft, non-tender, non-distended. Normoactive bowel sounds, no rebound/guarding; No hepatosplenomegaly  EXTREMITIES: Mild non-pitting BLE edema. 2+ peripheral pulses. No clubbing, cyanosis.  MUSCULOSKELETAL: No joint swelling or tenderness to palpation  SKIN: No rashes or lesions  NEUROLOGIC: A&Ox3. No focal deficits.  PSYCH: Affect appropriate      LABS:                        7.6    5.55  )-----------( 173      ( 23 Sep 2023 06:58 )             23.1     09-23  141  |  100  |  41<H>  ----------------------------<  87  3.8   |  26  |  4.87<H>    Ca    8.5      23 Sep 2023 06:58  Phos  5.4     09-23  Mg     2.2     09-23    TPro  6.0  /  Alb  3.0<L>  /  TBili  0.1<L>  /  DBili  x   /  AST  12  /  ALT  8<L>  /  AlkPhos  39<L>  09-23    Urinalysis Basic - ( 23 Sep 2023 06:58 )  Color: x / Appearance: x / SG: x / pH: x  Gluc: 87 mg/dL / Ketone: x  / Bili: x / Urobili: x   Blood: x / Protein: x / Nitrite: x   Leuk Esterase: x / RBC: x / WBC x   Sq Epi: x / Non Sq Epi: x / Bacteria: x        RADIOLOGY & ADDITIONAL TESTS:  Results Reviewed:   Imaging Personally Reviewed:  Electrocardiogram Personally Reviewed:    COORDINATION OF CARE:  Care Discussed with Consultants/Other Providers [Y/N]:  Prior or Outpatient Records Reviewed [Y/N]:

## 2023-09-24 NOTE — DISCHARGE NOTE PROVIDER - NSDCCPTREATMENT_GEN_ALL_CORE_FT
PRINCIPAL PROCEDURE  Procedure: Portable x-ray of chest, AP view  Findings and Treatment:   09/20/23:  FINDINGS:  The heart size is not accurately assessed on this projection.  Bibasilar hazy opacities.  There is no pneumothorax. Trace bilateral pleural effusions.  IMPRESSION:  Bibasilar hazy opacities.  09/22/23:  IMPRESSION:  No focal consolidation. Trace left pleural effusion.      SECONDARY PROCEDURE  Procedure: X-ray of lumbar spine, two views  Findings and Treatment:   FINDINGS:  The vertebral body heights are maintained. No acute compression fracture or malalignment of the vertebral bodies. Disc space heights are   maintained.  IMPRESSION:  No evidence of acute compression deformity or traumatic malalignment of the lumbosacral spine.

## 2023-09-24 NOTE — PROGRESS NOTE ADULT - PROBLEM SELECTOR PLAN 7
Pt w/ a history of HLD, on home rosuvastatin 40mg qd  - c/w atorvastatin 40mg qhs Patient with a history of hypertension, on home amlodipine 10mg qd, hydralazine 100mg TID, Toprol XL 25mg qd.   - Will restart patient's home BP regimen  - Will hold patient's home diuretics (bumetanide, metolazone) given ESRD status

## 2023-09-24 NOTE — PROGRESS NOTE ADULT - PROBLEM SELECTOR PLAN 11
- DVT PPx: heparin subq  - Diet: NPO for Shiley placement  - GOC: full code  - Pharmacy: Rx pharmacy on [address]  - Dispo: pending clinical course  - Communication:

## 2023-09-24 NOTE — PROGRESS NOTE ADULT - PROBLEM SELECTOR PLAN 2
Pt presenting w/ chills, met sepsis criteria on admission with fever, tachycardia, and leukocytosis  - RVP+ for enterorhinovirus  - s/p 1x vancomycin and cefepime in the ED  - c/w cefepime 500mg (dose for eGFR)  - Currently requiring 2L NC, wean as tolerated  - Supportive care Pt presenting w/ chills, met sepsis criteria on admission with fever, tachycardia, and leukocytosis  - RVP+ for enterorhinovirus  - s/p 1x vancomycin and cefepime in the ED  - s/p cefepime course (9/19-9/22)  - Weaned off O2  - Supportive care

## 2023-09-24 NOTE — DISCHARGE NOTE PROVIDER - NSDCFUADDAPPT_GEN_ALL_CORE_FT
Please follow up with your outpatient nephrologist (Dr. Garibay) within 2 weeks of discharge from the hospital. Please also go to your outpatient dialysis sessions as scheduled.    Please follow up with your outpatient primary care provider ( .....) within 2 weeks of discharge from the hospital.    Please follow up with an outpatient endocrinologist within 2 weeks of discharge from the hospital for further management of your Type 2 diabetes and for your low calcium. Please follow up with your outpatient nephrologist (Dr. Garibay) within 2 weeks of discharge from the hospital. Please also go to your outpatient dialysis sessions as scheduled.    Please follow up with an outpatient endocrinologist within 2 weeks of discharge from the hospital for further management of your Type 2 diabetes and for your low calcium.    Please follow up with your outpatient primary care provider within 2 weeks of discharge from the hospital. You may call [phone] to make an appointment with General Internal Medicine, [address] [place] [address], [address], [place] [address].

## 2023-09-24 NOTE — DISCHARGE NOTE PROVIDER - CARE PROVIDER_API CALL
Jose M Garibay  Nephrology  [address] [place] [address], [address]nd [place] [address]  Phone: [phone]  Fax: [phone]  Established Patient  Follow Up Time: 1 week   Jose M Garibay  Nephrology  [address] [facility], [address]nd [place] [address]  Phone: [phone]  Fax: [phone]  Established Patient  Follow Up Time: 1 week    Kalyani Dee  Internal Medicine  [address] [facility], [address]  [place] [address]  Phone: [phone]  Fax: [phone]  Follow Up Time: 2 weeks

## 2023-09-24 NOTE — DISCHARGE NOTE PROVIDER - CARE PROVIDERS DIRECT ADDRESSES
[email or web address]

## 2023-09-24 NOTE — PROGRESS NOTE ADULT - PROBLEM SELECTOR PLAN 1
Pt w/ a history of ESRD, follows with Dr. Jose M Garibay outpt. Prior hospitalization at [facility] 9/6-9/7 for HD initiation, but left AMA. , Cr 10.92 on admission now.  - Pt states that she is unwilling to have the Shiley placed without being put to sleep for it  - Nephrology consulted, recommend initiation of HD on this admission w/ Shiley placement by IR  - Will check HBV surface antigen, PTH (hypocalcemia)  - c/w phoslo 667mg PO TID premeals for hyperphosphatemia  - c/w sodium bicabonate 650mg PO TID  - R IJ Shiley placed 9/21 with first session of HD  - Flomax started 9/23 to help with voiding (retention on admission, difficult rai), consider TOV in next couple days Pt w/ a history of ESRD, follows with Dr. Jose M Garibay outpt. Prior hospitalization at [facility] 9/6-9/7 for HD initiation, but left AMA. , Cr 10.92 on admission now.  - Pt states that she is unwilling to have the Shiley placed without being put to sleep for it  - Nephrology consulted, recommend initiation of HD on this admission w/ Shiley placement by IR  - Will check HBV surface antigen, PTH (hypocalcemia)  - c/w phoslo 667mg PO TID premeals for hyperphosphatemia  - c/w sodium bicabonate 650mg PO TID  - R IJ Shiley placed 9/21 with first session of HD, 2nd HD session 9/22  - Flomax started 9/23 to help with voiding (retention on admission, difficult rai), consider TOV in next couple days  - Next HD 9/25

## 2023-09-25 LAB
ALBUMIN SERPL ELPH-MCNC: 3.3 G/DL — SIGNIFICANT CHANGE UP (ref 3.3–5)
ALP SERPL-CCNC: 45 U/L — SIGNIFICANT CHANGE UP (ref 40–120)
ALT FLD-CCNC: 13 U/L — SIGNIFICANT CHANGE UP (ref 10–45)
ANION GAP SERPL CALC-SCNC: 16 MMOL/L — SIGNIFICANT CHANGE UP (ref 5–17)
AST SERPL-CCNC: 18 U/L — SIGNIFICANT CHANGE UP (ref 10–40)
BASOPHILS # BLD AUTO: 0.02 K/UL — SIGNIFICANT CHANGE UP (ref 0–0.2)
BASOPHILS NFR BLD AUTO: 0.3 % — SIGNIFICANT CHANGE UP (ref 0–2)
BILIRUB SERPL-MCNC: 0.1 MG/DL — LOW (ref 0.2–1.2)
BUN SERPL-MCNC: 72 MG/DL — HIGH (ref 7–23)
CALCIUM SERPL-MCNC: 8.6 MG/DL — SIGNIFICANT CHANGE UP (ref 8.4–10.5)
CHLORIDE SERPL-SCNC: 97 MMOL/L — SIGNIFICANT CHANGE UP (ref 96–108)
CO2 SERPL-SCNC: 25 MMOL/L — SIGNIFICANT CHANGE UP (ref 22–31)
CREAT SERPL-MCNC: 7.46 MG/DL — HIGH (ref 0.5–1.3)
CULTURE RESULTS: SIGNIFICANT CHANGE UP
CULTURE RESULTS: SIGNIFICANT CHANGE UP
EGFR: 6 ML/MIN/1.73M2 — LOW
EOSINOPHIL # BLD AUTO: 0.12 K/UL — SIGNIFICANT CHANGE UP (ref 0–0.5)
EOSINOPHIL NFR BLD AUTO: 1.9 % — SIGNIFICANT CHANGE UP (ref 0–6)
GLUCOSE BLDC GLUCOMTR-MCNC: 134 MG/DL — HIGH (ref 70–99)
GLUCOSE BLDC GLUCOMTR-MCNC: 195 MG/DL — HIGH (ref 70–99)
GLUCOSE BLDC GLUCOMTR-MCNC: 323 MG/DL — HIGH (ref 70–99)
GLUCOSE BLDC GLUCOMTR-MCNC: 404 MG/DL — HIGH (ref 70–99)
GLUCOSE SERPL-MCNC: 189 MG/DL — HIGH (ref 70–99)
HAV IGM SER-ACNC: SIGNIFICANT CHANGE UP
HBV CORE AB SER-ACNC: SIGNIFICANT CHANGE UP
HBV CORE IGM SER-ACNC: SIGNIFICANT CHANGE UP
HBV SURFACE AB SER-ACNC: <3 MIU/ML — LOW
HBV SURFACE AB SER-ACNC: SIGNIFICANT CHANGE UP
HBV SURFACE AB SER-ACNC: SIGNIFICANT CHANGE UP
HBV SURFACE AG SER-ACNC: SIGNIFICANT CHANGE UP
HBV SURFACE AG SER-ACNC: SIGNIFICANT CHANGE UP
HCT VFR BLD CALC: 22.2 % — LOW (ref 34.5–45)
HCV AB S/CO SERPL IA: 0.07 S/CO — SIGNIFICANT CHANGE UP (ref 0–0.99)
HCV AB SERPL-IMP: SIGNIFICANT CHANGE UP
HGB BLD-MCNC: 7.4 G/DL — LOW (ref 11.5–15.5)
IMM GRANULOCYTES NFR BLD AUTO: 1.6 % — HIGH (ref 0–0.9)
LYMPHOCYTES # BLD AUTO: 1.42 K/UL — SIGNIFICANT CHANGE UP (ref 1–3.3)
LYMPHOCYTES # BLD AUTO: 22.7 % — SIGNIFICANT CHANGE UP (ref 13–44)
MAGNESIUM SERPL-MCNC: 2.3 MG/DL — SIGNIFICANT CHANGE UP (ref 1.6–2.6)
MCHC RBC-ENTMCNC: 29 PG — SIGNIFICANT CHANGE UP (ref 27–34)
MCHC RBC-ENTMCNC: 33.3 GM/DL — SIGNIFICANT CHANGE UP (ref 32–36)
MCV RBC AUTO: 87.1 FL — SIGNIFICANT CHANGE UP (ref 80–100)
MONOCYTES # BLD AUTO: 0.51 K/UL — SIGNIFICANT CHANGE UP (ref 0–0.9)
MONOCYTES NFR BLD AUTO: 8.2 % — SIGNIFICANT CHANGE UP (ref 2–14)
NEUTROPHILS # BLD AUTO: 4.08 K/UL — SIGNIFICANT CHANGE UP (ref 1.8–7.4)
NEUTROPHILS NFR BLD AUTO: 65.3 % — SIGNIFICANT CHANGE UP (ref 43–77)
NRBC # BLD: 0 /100 WBCS — SIGNIFICANT CHANGE UP (ref 0–0)
PHOSPHATE SERPL-MCNC: 4.2 MG/DL — SIGNIFICANT CHANGE UP (ref 2.5–4.5)
PLATELET # BLD AUTO: 177 K/UL — SIGNIFICANT CHANGE UP (ref 150–400)
POTASSIUM SERPL-MCNC: 4.7 MMOL/L — SIGNIFICANT CHANGE UP (ref 3.5–5.3)
POTASSIUM SERPL-SCNC: 4.7 MMOL/L — SIGNIFICANT CHANGE UP (ref 3.5–5.3)
PROT SERPL-MCNC: 6.1 G/DL — SIGNIFICANT CHANGE UP (ref 6–8.3)
RBC # BLD: 2.55 M/UL — LOW (ref 3.8–5.2)
RBC # FLD: 12.1 % — SIGNIFICANT CHANGE UP (ref 10.3–14.5)
SODIUM SERPL-SCNC: 138 MMOL/L — SIGNIFICANT CHANGE UP (ref 135–145)
SPECIMEN SOURCE: SIGNIFICANT CHANGE UP
SPECIMEN SOURCE: SIGNIFICANT CHANGE UP
WBC # BLD: 6.25 K/UL — SIGNIFICANT CHANGE UP (ref 3.8–10.5)
WBC # FLD AUTO: 6.25 K/UL — SIGNIFICANT CHANGE UP (ref 3.8–10.5)

## 2023-09-25 PROCEDURE — 90935 HEMODIALYSIS ONE EVALUATION: CPT

## 2023-09-25 PROCEDURE — 99232 SBSQ HOSP IP/OBS MODERATE 35: CPT

## 2023-09-25 PROCEDURE — 99232 SBSQ HOSP IP/OBS MODERATE 35: CPT | Mod: GC

## 2023-09-25 RX ORDER — INSULIN LISPRO 100/ML
10 VIAL (ML) SUBCUTANEOUS
Refills: 0 | Status: DISCONTINUED | OUTPATIENT
Start: 2023-09-25 | End: 2023-09-28

## 2023-09-25 RX ORDER — INSULIN GLARGINE 100 [IU]/ML
18 INJECTION, SOLUTION SUBCUTANEOUS ONCE
Refills: 0 | Status: COMPLETED | OUTPATIENT
Start: 2023-09-25 | End: 2023-09-25

## 2023-09-25 RX ORDER — INSULIN LISPRO 100/ML
VIAL (ML) SUBCUTANEOUS
Refills: 0 | Status: DISCONTINUED | OUTPATIENT
Start: 2023-09-25 | End: 2023-09-28

## 2023-09-25 RX ORDER — INSULIN GLARGINE 100 [IU]/ML
22 INJECTION, SOLUTION SUBCUTANEOUS AT BEDTIME
Refills: 0 | Status: DISCONTINUED | OUTPATIENT
Start: 2023-09-26 | End: 2023-09-27

## 2023-09-25 RX ADMIN — ATORVASTATIN CALCIUM 80 MILLIGRAM(S): 80 TABLET, FILM COATED ORAL at 21:36

## 2023-09-25 RX ADMIN — GABAPENTIN 200 MILLIGRAM(S): 400 CAPSULE ORAL at 21:35

## 2023-09-25 RX ADMIN — Medication 667 MILLIGRAM(S): at 17:51

## 2023-09-25 RX ADMIN — Medication 100 MILLIGRAM(S): at 21:35

## 2023-09-25 RX ADMIN — Medication 10 UNIT(S): at 17:46

## 2023-09-25 RX ADMIN — GABAPENTIN 200 MILLIGRAM(S): 400 CAPSULE ORAL at 05:44

## 2023-09-25 RX ADMIN — INSULIN GLARGINE 18 UNIT(S): 100 INJECTION, SOLUTION SUBCUTANEOUS at 21:55

## 2023-09-25 RX ADMIN — IRON SUCROSE 110 MILLIGRAM(S): 20 INJECTION, SOLUTION INTRAVENOUS at 17:46

## 2023-09-25 RX ADMIN — Medication 3 MILLIGRAM(S): at 21:36

## 2023-09-25 RX ADMIN — FAMOTIDINE 20 MILLIGRAM(S): 10 INJECTION INTRAVENOUS at 13:23

## 2023-09-25 RX ADMIN — Medication 100 MILLIGRAM(S): at 13:23

## 2023-09-25 RX ADMIN — Medication 667 MILLIGRAM(S): at 08:45

## 2023-09-25 RX ADMIN — Medication 6 UNIT(S): at 13:22

## 2023-09-25 RX ADMIN — Medication 100 MILLIGRAM(S): at 05:42

## 2023-09-25 RX ADMIN — Medication 1: at 08:37

## 2023-09-25 RX ADMIN — Medication 10 MILLIGRAM(S): at 05:42

## 2023-09-25 RX ADMIN — Medication 650 MILLIGRAM(S): at 05:43

## 2023-09-25 RX ADMIN — GABAPENTIN 200 MILLIGRAM(S): 400 CAPSULE ORAL at 13:23

## 2023-09-25 RX ADMIN — TAMSULOSIN HYDROCHLORIDE 0.4 MILLIGRAM(S): 0.4 CAPSULE ORAL at 21:36

## 2023-09-25 RX ADMIN — Medication 4: at 17:47

## 2023-09-25 RX ADMIN — Medication 6: at 13:22

## 2023-09-25 RX ADMIN — Medication 25 MILLIGRAM(S): at 05:42

## 2023-09-25 RX ADMIN — Medication 667 MILLIGRAM(S): at 13:23

## 2023-09-25 RX ADMIN — SENNA PLUS 2 TABLET(S): 8.6 TABLET ORAL at 21:36

## 2023-09-25 RX ADMIN — CHLORHEXIDINE GLUCONATE 1 APPLICATION(S): 213 SOLUTION TOPICAL at 05:45

## 2023-09-25 RX ADMIN — AMLODIPINE BESYLATE 10 MILLIGRAM(S): 2.5 TABLET ORAL at 05:42

## 2023-09-25 RX ADMIN — Medication 650 MILLIGRAM(S): at 23:52

## 2023-09-25 RX ADMIN — Medication 6 UNIT(S): at 08:37

## 2023-09-25 RX ADMIN — ERYTHROPOIETIN 4000 UNIT(S): 10000 INJECTION, SOLUTION INTRAVENOUS; SUBCUTANEOUS at 06:19

## 2023-09-25 NOTE — PROGRESS NOTE ADULT - PROBLEM SELECTOR PLAN 2
Pt presenting w/ chills, met sepsis criteria on admission with fever, tachycardia, and leukocytosis  - RVP+ for enterorhinovirus  - s/p 1x vancomycin and cefepime in the ED  - s/p cefepime course (9/19-9/22)  - Weaned off O2  - Supportive care

## 2023-09-25 NOTE — PROGRESS NOTE ADULT - PROBLEM SELECTOR PLAN 3
LDL goal <70 due to DM  Pt LDL NA  Order fasting lipid if not recently done  Statin as noted above  Manage per primary team   F/u levels as out pt

## 2023-09-25 NOTE — PROGRESS NOTE ADULT - PROBLEM SELECTOR PLAN 1
Pt w/ a history of ESRD, follows with Dr. Jose M Garibay outpt. Prior hospitalization at [facility] 9/6-9/7 for HD initiation, but left AMA. , Cr 10.92 on admission now.  - Pt states that she is unwilling to have the Shiley placed without being put to sleep for it  - Nephrology consulted, recommend initiation of HD on this admission w/ Shiley placement by IR  - Will check HBV surface antigen, PTH (hypocalcemia)  - c/w phoslo 667mg PO TID premeals for hyperphosphatemia  - c/w sodium bicabonate 650mg PO TID  - R IJ Shiley placed 9/21 with first session of HD, 2nd HD session 9/22  - Flomax started 9/23 to help with voiding (retention on admission, difficult rai), consider TOV in next couple days  - Next HD 9/25 Pt w/ a history of ESRD, follows with Dr. Jose M Garibay outpt. Prior hospitalization at [facility] 9/6-9/7 for HD initiation, but left AMA. , Cr 10.92 on admission now.  - Pt states that she is unwilling to have the Shiley placed without being put to sleep for it  - Nephrology consulted, recommend initiation of HD on this admission w/ Shiley placement by IR  - c/w phoslo 667mg PO TID premeals for hyperphosphatemia  - c/w sodium bicabonate 650mg PO TID  - R IJ Shiley placed 9/21 with first session of HD, 2nd HD session 9/22, 3rd HD session 9/25  - Flomax started 9/23 to help with voiding (retention on admission, difficult rai), TOV 9/25

## 2023-09-25 NOTE — PROGRESS NOTE ADULT - ASSESSMENT
Patient is a 52 y/o F w/ a PMHx of ESRD not on HD, poorly controlled Type 2 DM on insulin, HTN, and sarcoidosis, with a recent admission at [facility] (9/6-9/7) for HD initiation but left AMA before HD was started, who is presenting with chills and lower back pain, found to be enterorhinovirus positive on RVP with leukocytosis, as well as anemia and elevated BUN/creatinine, admitted for HD initiation and suspected viral pneumonia.

## 2023-09-25 NOTE — PROGRESS NOTE ADULT - PROBLEM SELECTOR PLAN 7
Patient with a history of hypertension, on home amlodipine 10mg qd, hydralazine 100mg TID, Toprol XL 25mg qd.   - Will restart patient's home BP regimen  - Will hold patient's home diuretics (bumetanide, metolazone) given ESRD status Patient with a history of hypertension, on home amlodipine 10mg qd, hydralazine 100mg TID, Toprol XL 25mg qd.   - c/w patient's home BP regimen  - Will hold patient's home diuretics (bumetanide, metolazone) given ESRD status

## 2023-09-25 NOTE — PROGRESS NOTE ADULT - PROBLEM SELECTOR PLAN 1
- Test BG ac, hs and 2am. Q6h when NPO  - Increase Lantus to 22 units q hs. Administer 18 units for NPO tonight  - Increase admelog to 10 units TIDAC . HOLD IF NPO/NOT EATING  - Continue with Low dose admelog correction scale TIDQAC and separate low dose scale QHS and 2am  - Will continue to adjust insulin as needed   Discharge:   Basal/bolus insulin (dose TBD)   Make sure pt has Rx for all DM supplies and insulin  Can follow at [facility] practice. [address] [facility] [address]. Phone . Call for apt closer to discharge  Needs Optho/renal f/u

## 2023-09-25 NOTE — PROGRESS NOTE ADULT - ATTENDING COMMENTS
-HD today per renal. F/u renal recs.   -TOV today. C/w flomax nightly.   -Endo recs for DM appreciated.   -IR for permacath conversion tomorrow appreciated.   -CM f/u for outpatient HD planning. -HD today per renal. F/u renal recs. -IV iron and epogen for anemia per renal recs.   -TOV today. C/w flomax nightly.   -Endo recs for DM appreciated.   -IR for permacath conversion tomorrow appreciated.   -CM f/u for outpatient HD planning.

## 2023-09-25 NOTE — PROGRESS NOTE ADULT - PROBLEM SELECTOR PLAN 4
Pt with a history of anemia in the setting of ESRD. Baseline hemoglobin ~7-8.   - Hb on admission 6.7, received 1u pRBCs  - Iron low, ferritin low  - Will start oral ferrous sulfate Pt with a history of anemia in the setting of ESRD. Baseline hemoglobin ~7-8.   - Hb on admission 6.7, received 1u pRBCs  - Iron low, ferritin low  - c/w oral ferrous sulfate

## 2023-09-25 NOTE — PROGRESS NOTE ADULT - ASSESSMENT
53 F w/h/o uncontrolled T2DM (A1C: 9.2%) on basal/bolus insulin PTA. DM c/b ESRD not on HD, neuropathy. Also h/o HTN, and sarcoidosis on Prednisone 10mg at home. Recent admission at [facility] (9/6-9/7) for HD initiation but left AMA before HD was started. Here with chills and lower back pain, LE edema. Found to have + enterorhinovirus with  leukocytosis, as well as anemia, elevated BUN/creatinine, admitted for HD initiation and suspected viral pneumonia. Endocrinology consulted for hypocalcemia and T2D management. Also found to have Hyperparathyroidism and Vitamin D deficiency. Tolerating POs with BG levels high 100s to 400s in the last 24 hours. Will increase insulin doses to BG goal of 100 to 180s. No hypoglycemia    Noted NPO order for conversion from non-tunneled to tunneled HD catheter tomorrow       Spoke to pt about the need for diet adherence> pt states she knows about diet but unable to verbalize if she can follow it. Also discussed effects of HD on glucose control and the likely need of insulin adjustments upon discharge. Pt verbalized understanding.     Home regimen: Basaglar 35 units qhs and Admelog 10-15 units TIDAC

## 2023-09-25 NOTE — PROGRESS NOTE ADULT - PROBLEM SELECTOR PLAN 3
Pt w/ hypocalcemia, low PTH, and low vitamin D, likely multifactorial i/s/o ESRD w/ elevated phos, severe vitamin D deficiency, and possible hypoparathyroidism (versus low PTH i/s/o electrolyte abnormalities)  - Endocrinology consulted, appreciate recs  - Repeat PTH elevated, more consistent with secondary hyperparathyroidism in the setting of ESRD  - c/w ergocalciferol 55137 units qweekly

## 2023-09-25 NOTE — PROGRESS NOTE ADULT - PROBLEM SELECTOR PLAN 4
Per Dr Singleton:  - Patient denies any personal or family history of calcium disorders. No hx neck surgery, diuretic use, or the use of bisphosphonates or any other medications for low bone density.   - Hypocalcemia likely multifactorial i/s/o ESRD w/ elevated phos, severe vitamin D deficiency, and possible hypoparathyroidism (versus low PTH i/s/o electrolyte abnormalities)  -Repeat PTH elevated, more consistent with secondary hyperparathyroidism in the setting of ESRD and severe vitamin D deficiency   c/w calcium acetate as ordered  c/w HD as per nephro

## 2023-09-25 NOTE — PROGRESS NOTE ADULT - SUBJECTIVE AND OBJECTIVE BOX
PROGRESS NOTE:   Patient is a 53y old  Female who presents with a chief complaint of Chills (24 Sep 2023 16:48)    SUBJECTIVE / OVERNIGHT EVENTS:        MEDICATIONS  (STANDING):  amLODIPine   Tablet 10 milliGRAM(s) Oral daily  atorvastatin 80 milliGRAM(s) Oral at bedtime  calcium acetate 667 milliGRAM(s) Oral three times a day with meals  chlorhexidine 4% Liquid 1 Application(s) Topical <User Schedule>  dextrose 5%. 1000 milliLiter(s) (50 mL/Hr) IV Continuous <Continuous>  dextrose 5%. 1000 milliLiter(s) (100 mL/Hr) IV Continuous <Continuous>  dextrose 50% Injectable 25 Gram(s) IV Push once  dextrose 50% Injectable 12.5 Gram(s) IV Push once  dextrose 50% Injectable 25 Gram(s) IV Push once  epoetin suzette-epbx (RETACRIT) Injectable 4000 Unit(s) IV Push <User Schedule>  ergocalciferol 63466 Unit(s) Oral every week  famotidine    Tablet 20 milliGRAM(s) Oral daily  gabapentin 200 milliGRAM(s) Oral three times a day  glucagon  Injectable 1 milliGRAM(s) IntraMuscular once  hydrALAZINE 100 milliGRAM(s) Oral three times a day  influenza   Vaccine 0.5 milliLiter(s) IntraMuscular once  insulin glargine Injectable (LANTUS) 17 Unit(s) SubCutaneous at bedtime  insulin lispro (ADMELOG) corrective regimen sliding scale   SubCutaneous three times a day before meals  insulin lispro (ADMELOG) corrective regimen sliding scale   SubCutaneous at bedtime  insulin lispro Injectable (ADMELOG) 6 Unit(s) SubCutaneous three times a day before meals  iron sucrose IVPB 200 milliGRAM(s) IV Intermittent every 24 hours  melatonin 3 milliGRAM(s) Oral at bedtime  metoprolol succinate ER 25 milliGRAM(s) Oral daily  predniSONE   Tablet 10 milliGRAM(s) Oral daily  senna 2 Tablet(s) Oral at bedtime  tamsulosin 0.4 milliGRAM(s) Oral at bedtime    MEDICATIONS  (PRN):  acetaminophen     Tablet .. 650 milliGRAM(s) Oral every 6 hours PRN Mild Pain (1 - 3)  dextrose Oral Gel 15 Gram(s) Oral once PRN Blood Glucose LESS THAN 70 milliGRAM(s)/deciliter  sodium chloride 0.9% lock flush 10 milliLiter(s) IV Push every 1 hour PRN Pre/post blood products, medications, blood draw, and to maintain line patency      CAPILLARY BLOOD GLUCOSE  POCT Blood Glucose.: 413 mg/dL (24 Sep 2023 21:41)  POCT Blood Glucose.: 246 mg/dL (24 Sep 2023 17:07)  POCT Blood Glucose.: 322 mg/dL (24 Sep 2023 12:33)  POCT Blood Glucose.: 204 mg/dL (24 Sep 2023 09:06)    I&O's Summary  24 Sep 2023 07:01  -  25 Sep 2023 07:00  --------------------------------------------------------  IN: 0 mL / OUT: 1000 mL / NET: -1000 mL      PHYSICAL EXAM:  Vital Signs Last 24 Hrs  T(C): 37 (25 Sep 2023 04:19), Max: 37 (25 Sep 2023 04:19)  T(F): 98.6 (25 Sep 2023 04:19), Max: 98.6 (25 Sep 2023 04:19)  HR: 94 (25 Sep 2023 04:19) (82 - 96)  BP: 148/86 (25 Sep 2023 04:19) (122/73 - 148/86)  BP(mean): --  RR: 18 (25 Sep 2023 04:19) (18 - 18)  SpO2: 97% (25 Sep 2023 04:19) (97% - 100%)  Parameters below as of 25 Sep 2023 04:19  Patient On (Oxygen Delivery Method): room air    CONSTITUTIONAL: NAD, well-developed  HEENT: PERRLA, EOMI, moist mucous membranes.  NECK: Supple. No JVD. +R IJ Shiley, site appears c/d/i.  RESPIRATORY: Normal respiratory effort, Lungs CTAB  CARDIOVASCULAR: Regular rate and rhythm with normal S1 and S2. No murmurs.  ABDOMEN: Soft, non-tender, non-distended. Normoactive bowel sounds, no rebound/guarding; No hepatosplenomegaly  EXTREMITIES: Mild non-pitting BLE edema. 2+ peripheral pulses. No clubbing, cyanosis.  MUSCULOSKELETAL: No joint swelling or tenderness to palpation  SKIN: No rashes or lesions  NEUROLOGIC: A&Ox3. No focal deficits.  PSYCH: Affect appropriate      LABS:                        7.7    5.57  )-----------( 189      ( 24 Sep 2023 07:50 )             24.1     09-24    140  |  98  |  61<H>  ----------------------------<  185<H>  4.4   |  25  |  6.88<H>    Ca    8.6      24 Sep 2023 07:50  Phos  5.7     09-24  Mg     2.3     09-24    TPro  6.4  /  Alb  3.4  /  TBili  <0.1<L>  /  DBili  x   /  AST  15  /  ALT  9<L>  /  AlkPhos  49  09-24    Urinalysis Basic - ( 24 Sep 2023 07:50 )  Color: x / Appearance: x / SG: x / pH: x  Gluc: 185 mg/dL / Ketone: x  / Bili: x / Urobili: x   Blood: x / Protein: x / Nitrite: x   Leuk Esterase: x / RBC: x / WBC x   Sq Epi: x / Non Sq Epi: x / Bacteria: x        RADIOLOGY & ADDITIONAL TESTS:  Results Reviewed:   Imaging Personally Reviewed:  Electrocardiogram Personally Reviewed:    COORDINATION OF CARE:  Care Discussed with Consultants/Other Providers [Y/N]:  Prior or Outpatient Records Reviewed [Y/N]: PROGRESS NOTE:   Patient is a 53y old  Female who presents with a chief complaint of Chills (24 Sep 2023 16:48)    SUBJECTIVE / OVERNIGHT EVENTS:  No acute events overnight. Patient states that she is upset about the De Los Santos catheter and would like it removed today. She otherwise still endorses some neuropathic pain, that is improved with the increased gabapentin. Patient denies any fever, chills, SOB, chest pain, abdominal pain, nausea/vomiting, diarrhea, or urinary symptoms.      MEDICATIONS  (STANDING):  amLODIPine   Tablet 10 milliGRAM(s) Oral daily  atorvastatin 80 milliGRAM(s) Oral at bedtime  calcium acetate 667 milliGRAM(s) Oral three times a day with meals  chlorhexidine 4% Liquid 1 Application(s) Topical <User Schedule>  dextrose 5%. 1000 milliLiter(s) (50 mL/Hr) IV Continuous <Continuous>  dextrose 5%. 1000 milliLiter(s) (100 mL/Hr) IV Continuous <Continuous>  dextrose 50% Injectable 25 Gram(s) IV Push once  dextrose 50% Injectable 12.5 Gram(s) IV Push once  dextrose 50% Injectable 25 Gram(s) IV Push once  epoetin suzette-epbx (RETACRIT) Injectable 4000 Unit(s) IV Push <User Schedule>  ergocalciferol 32694 Unit(s) Oral every week  famotidine    Tablet 20 milliGRAM(s) Oral daily  gabapentin 200 milliGRAM(s) Oral three times a day  glucagon  Injectable 1 milliGRAM(s) IntraMuscular once  hydrALAZINE 100 milliGRAM(s) Oral three times a day  influenza   Vaccine 0.5 milliLiter(s) IntraMuscular once  insulin glargine Injectable (LANTUS) 17 Unit(s) SubCutaneous at bedtime  insulin lispro (ADMELOG) corrective regimen sliding scale   SubCutaneous three times a day before meals  insulin lispro (ADMELOG) corrective regimen sliding scale   SubCutaneous at bedtime  insulin lispro Injectable (ADMELOG) 6 Unit(s) SubCutaneous three times a day before meals  iron sucrose IVPB 200 milliGRAM(s) IV Intermittent every 24 hours  melatonin 3 milliGRAM(s) Oral at bedtime  metoprolol succinate ER 25 milliGRAM(s) Oral daily  predniSONE   Tablet 10 milliGRAM(s) Oral daily  senna 2 Tablet(s) Oral at bedtime  tamsulosin 0.4 milliGRAM(s) Oral at bedtime    MEDICATIONS  (PRN):  acetaminophen     Tablet .. 650 milliGRAM(s) Oral every 6 hours PRN Mild Pain (1 - 3)  dextrose Oral Gel 15 Gram(s) Oral once PRN Blood Glucose LESS THAN 70 milliGRAM(s)/deciliter  sodium chloride 0.9% lock flush 10 milliLiter(s) IV Push every 1 hour PRN Pre/post blood products, medications, blood draw, and to maintain line patency      CAPILLARY BLOOD GLUCOSE  POCT Blood Glucose.: 413 mg/dL (24 Sep 2023 21:41)  POCT Blood Glucose.: 246 mg/dL (24 Sep 2023 17:07)  POCT Blood Glucose.: 322 mg/dL (24 Sep 2023 12:33)  POCT Blood Glucose.: 204 mg/dL (24 Sep 2023 09:06)    I&O's Summary  24 Sep 2023 07:01  -  25 Sep 2023 07:00  --------------------------------------------------------  IN: 0 mL / OUT: 1000 mL / NET: -1000 mL      PHYSICAL EXAM:  Vital Signs Last 24 Hrs  T(C): 37 (25 Sep 2023 04:19), Max: 37 (25 Sep 2023 04:19)  T(F): 98.6 (25 Sep 2023 04:19), Max: 98.6 (25 Sep 2023 04:19)  HR: 94 (25 Sep 2023 04:19) (82 - 96)  BP: 148/86 (25 Sep 2023 04:19) (122/73 - 148/86)  BP(mean): --  RR: 18 (25 Sep 2023 04:19) (18 - 18)  SpO2: 97% (25 Sep 2023 04:19) (97% - 100%)  Parameters below as of 25 Sep 2023 04:19  Patient On (Oxygen Delivery Method): room air    CONSTITUTIONAL: NAD, well-developed  HEENT: PERRLA, EOMI, moist mucous membranes.  NECK: Supple. No JVD. +R IJ Shiley, site appears c/d/i.  RESPIRATORY: Normal respiratory effort, Lungs CTAB  CARDIOVASCULAR: Regular rate and rhythm with normal S1 and S2. No murmurs.  ABDOMEN: Soft, non-tender, non-distended. Normoactive bowel sounds, no rebound/guarding; No hepatosplenomegaly  EXTREMITIES: Mild non-pitting BLE edema. 2+ peripheral pulses. No clubbing, cyanosis.  MUSCULOSKELETAL: No joint swelling or tenderness to palpation  SKIN: No rashes or lesions  NEUROLOGIC: A&Ox3. No focal deficits.  PSYCH: Affect appropriate      LABS:                        7.7    5.57  )-----------( 189      ( 24 Sep 2023 07:50 )             24.1     09-24    140  |  98  |  61<H>  ----------------------------<  185<H>  4.4   |  25  |  6.88<H>    Ca    8.6      24 Sep 2023 07:50  Phos  5.7     09-24  Mg     2.3     09-24    TPro  6.4  /  Alb  3.4  /  TBili  <0.1<L>  /  DBili  x   /  AST  15  /  ALT  9<L>  /  AlkPhos  49  09-24    Urinalysis Basic - ( 24 Sep 2023 07:50 )  Color: x / Appearance: x / SG: x / pH: x  Gluc: 185 mg/dL / Ketone: x  / Bili: x / Urobili: x   Blood: x / Protein: x / Nitrite: x   Leuk Esterase: x / RBC: x / WBC x   Sq Epi: x / Non Sq Epi: x / Bacteria: x        RADIOLOGY & ADDITIONAL TESTS:  Results Reviewed:   Imaging Personally Reviewed:  Electrocardiogram Personally Reviewed:    COORDINATION OF CARE:  Care Discussed with Consultants/Other Providers [Y/N]:  Prior or Outpatient Records Reviewed [Y/N]: PROGRESS NOTE:   Patient is a 53y old  Female who presents with a chief complaint of Chills (24 Sep 2023 16:48)    SUBJECTIVE / OVERNIGHT EVENTS:  No acute events overnight. Patient states that she is upset about the De Los Santos catheter and would like it removed today. She otherwise still endorses some neuropathic pain, that is improved with the increased gabapentin. Pt amenable to Permacath placement with conscious sedation. Patient denies any fever, chills, SOB, chest pain, abdominal pain, nausea/vomiting, diarrhea, or urinary symptoms.      MEDICATIONS  (STANDING):  amLODIPine   Tablet 10 milliGRAM(s) Oral daily  atorvastatin 80 milliGRAM(s) Oral at bedtime  calcium acetate 667 milliGRAM(s) Oral three times a day with meals  chlorhexidine 4% Liquid 1 Application(s) Topical <User Schedule>  dextrose 5%. 1000 milliLiter(s) (50 mL/Hr) IV Continuous <Continuous>  dextrose 5%. 1000 milliLiter(s) (100 mL/Hr) IV Continuous <Continuous>  dextrose 50% Injectable 25 Gram(s) IV Push once  dextrose 50% Injectable 12.5 Gram(s) IV Push once  dextrose 50% Injectable 25 Gram(s) IV Push once  epoetin suzette-epbx (RETACRIT) Injectable 4000 Unit(s) IV Push <User Schedule>  ergocalciferol 97557 Unit(s) Oral every week  famotidine    Tablet 20 milliGRAM(s) Oral daily  gabapentin 200 milliGRAM(s) Oral three times a day  glucagon  Injectable 1 milliGRAM(s) IntraMuscular once  hydrALAZINE 100 milliGRAM(s) Oral three times a day  influenza   Vaccine 0.5 milliLiter(s) IntraMuscular once  insulin glargine Injectable (LANTUS) 17 Unit(s) SubCutaneous at bedtime  insulin lispro (ADMELOG) corrective regimen sliding scale   SubCutaneous three times a day before meals  insulin lispro (ADMELOG) corrective regimen sliding scale   SubCutaneous at bedtime  insulin lispro Injectable (ADMELOG) 6 Unit(s) SubCutaneous three times a day before meals  iron sucrose IVPB 200 milliGRAM(s) IV Intermittent every 24 hours  melatonin 3 milliGRAM(s) Oral at bedtime  metoprolol succinate ER 25 milliGRAM(s) Oral daily  predniSONE   Tablet 10 milliGRAM(s) Oral daily  senna 2 Tablet(s) Oral at bedtime  tamsulosin 0.4 milliGRAM(s) Oral at bedtime    MEDICATIONS  (PRN):  acetaminophen     Tablet .. 650 milliGRAM(s) Oral every 6 hours PRN Mild Pain (1 - 3)  dextrose Oral Gel 15 Gram(s) Oral once PRN Blood Glucose LESS THAN 70 milliGRAM(s)/deciliter  sodium chloride 0.9% lock flush 10 milliLiter(s) IV Push every 1 hour PRN Pre/post blood products, medications, blood draw, and to maintain line patency      CAPILLARY BLOOD GLUCOSE  POCT Blood Glucose.: 413 mg/dL (24 Sep 2023 21:41)  POCT Blood Glucose.: 246 mg/dL (24 Sep 2023 17:07)  POCT Blood Glucose.: 322 mg/dL (24 Sep 2023 12:33)  POCT Blood Glucose.: 204 mg/dL (24 Sep 2023 09:06)    I&O's Summary  24 Sep 2023 07:01  -  25 Sep 2023 07:00  --------------------------------------------------------  IN: 0 mL / OUT: 1000 mL / NET: -1000 mL      PHYSICAL EXAM:  Vital Signs Last 24 Hrs  T(C): 37 (25 Sep 2023 04:19), Max: 37 (25 Sep 2023 04:19)  T(F): 98.6 (25 Sep 2023 04:19), Max: 98.6 (25 Sep 2023 04:19)  HR: 94 (25 Sep 2023 04:19) (82 - 96)  BP: 148/86 (25 Sep 2023 04:19) (122/73 - 148/86)  BP(mean): --  RR: 18 (25 Sep 2023 04:19) (18 - 18)  SpO2: 97% (25 Sep 2023 04:19) (97% - 100%)  Parameters below as of 25 Sep 2023 04:19  Patient On (Oxygen Delivery Method): room air    CONSTITUTIONAL: NAD, well-developed  HEENT: PERRLA, EOMI, moist mucous membranes.  NECK: Supple. No JVD. +R IJ Shiley, site appears c/d/i.  RESPIRATORY: Normal respiratory effort, Lungs CTAB  CARDIOVASCULAR: Regular rate and rhythm with normal S1 and S2. No murmurs.  ABDOMEN: Soft, non-tender, non-distended. Normoactive bowel sounds, no rebound/guarding; No hepatosplenomegaly  EXTREMITIES: Mild non-pitting BLE edema. 2+ peripheral pulses. No clubbing, cyanosis.  MUSCULOSKELETAL: No joint swelling or tenderness to palpation  SKIN: No rashes or lesions  NEUROLOGIC: A&Ox3. No focal deficits.  PSYCH: Affect appropriate      LABS:                        7.7    5.57  )-----------( 189      ( 24 Sep 2023 07:50 )             24.1     09-24    140  |  98  |  61<H>  ----------------------------<  185<H>  4.4   |  25  |  6.88<H>    Ca    8.6      24 Sep 2023 07:50  Phos  5.7     09-24  Mg     2.3     09-24    TPro  6.4  /  Alb  3.4  /  TBili  <0.1<L>  /  DBili  x   /  AST  15  /  ALT  9<L>  /  AlkPhos  49  09-24    Urinalysis Basic - ( 24 Sep 2023 07:50 )  Color: x / Appearance: x / SG: x / pH: x  Gluc: 185 mg/dL / Ketone: x  / Bili: x / Urobili: x   Blood: x / Protein: x / Nitrite: x   Leuk Esterase: x / RBC: x / WBC x   Sq Epi: x / Non Sq Epi: x / Bacteria: x        RADIOLOGY & ADDITIONAL TESTS:  Results Reviewed:   Imaging Personally Reviewed:  Electrocardiogram Personally Reviewed:    COORDINATION OF CARE:  Care Discussed with Consultants/Other Providers [Y/N]:  Prior or Outpatient Records Reviewed [Y/N]:

## 2023-09-25 NOTE — CONSULT NOTE ADULT - SUBJECTIVE AND OBJECTIVE BOX
Interventional Radiology    Evaluate for Procedure: Conversion from non-tunneled HD cathter to tunneled- HD catheter     HPI:  Patient is a 54 y/o F w/ a PMHx of ESRD not on HD, poorly controlled Type 2 DM on insulin, HTN, and sarcoidosis, with a recent admission at [facility] (9/6-9/7) for HD initiation but left AMA before HD was started, who is presenting with chills and lower back pain. Patient states that she has had worsening chills since Friday and this morning woke up with 10/10 back pain. Patient's daughter states that the patient has been more tired in the past week since her prior AMA from [facility]. Denies any shortness of breath, cough, chest pain. Denies any sick contacts or recent travel. Patient also endorses worsening leg swelling since leaving [facility]. Patient denies any SOB, chest pain, abdominal pain, nausea/vomiting, diarrhea, or urinary symptoms.    Patient previously presented to the [facility] ED for initiation of dialysis on 09/06/2023 with creatinine of 9 and elevated potassium, also with fatigue and loss of appetite. At that time she had a fingerstick glucose in the 660s stating that she did not take her insulin or Lantus for > 2 days 2/2 neuropathic pain. She was found to have anion gap metabolic acidosis (bicarb 18, VBG pH 7.33) and hyperkalemia. MICU at that time deemed that she was not in need of emergent dialysis. Patient signed out AMA on 09/07/2023 due to frustrations with delays in the process of getting a Shiley placed for HD.    ED Course:  In the ED, patient was febrile (Tmax 102.9), tachycardic (102), hypertensive (174/76mmHg), requiring 2L NC after desatting to 88% on RA. Labs significant for leukocytosis (WBC 14.78), anemia (Hb 6.7), elevated BUN (144) and creatinine (10.92), hypocalcemia (7.4). UA was wnl. RVP positive for enterorhinovirus. CXR w/ bibasilar hazy opacities.   (20 Sep 2023 13:01)      PAST MEDICAL HISTORY:  53y    PAST SURGICAL HISTORY:  Female    MEDICATIONS:  No Known Allergies      ALLERGIES:    amLODIPine   Tablet: 10 milliGRAM(s) Oral (09-25 @ 05:42)  hydrALAZINE: 100 milliGRAM(s) Oral (09-25 @ 05:42)  metoprolol succinate ER: 25 milliGRAM(s) Oral (09-25 @ 05:42)      VITALS & I/Os:  Vital Signs Last 24 Hrs  T(C): 37 (25 Sep 2023 04:19), Max: 37 (25 Sep 2023 04:19)  T(F): 98.6 (25 Sep 2023 04:19), Max: 98.6 (25 Sep 2023 04:19)  HR: 94 (25 Sep 2023 04:19) (82 - 96)  BP: 148/86 (25 Sep 2023 04:19) (122/73 - 148/86)  BP(mean): --  RR: 18 (25 Sep 2023 04:19) (18 - 18)  SpO2: 97% (25 Sep 2023 04:19) (97% - 100%)    Parameters below as of 25 Sep 2023 04:19  Patient On (Oxygen Delivery Method): room air        I&O's Summary    24 Sep 2023 07:01  -  25 Sep 2023 07:00  --------------------------------------------------------  IN: 0 mL / OUT: 1000 mL / NET: -1000 mL        Allergies: No Known Allergies    Medications (Abx/Cardiac/Anticoagulation/Blood Products)    amLODIPine   Tablet: 10 milliGRAM(s) Oral (09-25 @ 05:42)  hydrALAZINE: 100 milliGRAM(s) Oral (09-25 @ 05:42)  metoprolol succinate ER: 25 milliGRAM(s) Oral (09-25 @ 05:42)    Data:    T(C): 37  HR: 94  BP: 148/86  RR: 18  SpO2: 97%    -WBC 6.25 / HgB 7.4 / Hct 22.2 / Plt 177  -Na 138 / Cl 97 / BUN 72 / Glucose 189  -K 4.7 / CO2 25 / Cr 7.46  -ALT 13 / Alk Phos 45 / T.Bili 0.1  -INR 0.91 / PTT 28.6    Radiology:     Assessment/Plan:   Patient is a 54 y/o F w/ a PMHx of ESRD not on HD, poorly controlled Type 2 DM on insulin, HTN, and sarcoidosis, with a recent admission at [facility] (9/6-9/7) for HD initiation but left AMA before HD was started, who is presenting with chills and lower back pain, found to be enterorhinovirus positive on RVP with leukocytosis, as well as anemia and elevated BUN/creatinine, admitted for HD initiation and suspected viral pneumonia. Per nephro CKD V and will require HD, elevated WBC w/+RVP panel and BCx pending (09/19 received). WBC normalized w/NGTD on BCx, IR consulted for conversion from non-tunneled HD to tunneled HD cathter     - case reviewed and approved for conversion from non-tunneled to tunneled HD cathter   - please place IR procedure order under Dr. Teddy Page 09/26  - STAT labs in AM (cbc,coags, bmp, T&S)  - hold DVT ppx pm and am dose  - NPO @ [facility]     Roel Jordan PGY2  Available on [employer] TEAMS

## 2023-09-25 NOTE — PROGRESS NOTE ADULT - PROBLEM SELECTOR PLAN 11
- DVT PPx: heparin subq  - Diet: NPO for Shiley placement  - GOC: full code  - Pharmacy: Rx pharmacy on [address]  - Dispo: pending clinical course  - Communication: - DVT PPx: heparin subq  - Diet: Renal, consistent carb  - GOC: full code  - Pharmacy: Rx pharmacy on [address]  - Dispo: outpatient dialysis, pending acceptance (prefers [facility])

## 2023-09-25 NOTE — PROGRESS NOTE ADULT - PROBLEM SELECTOR PLAN 6
Pt with a history of type 2 diabetes mellitus requiring insulin at home. Home regimen: Basaglar 35 units qhs with missed doses, Novolog 10 units premeal with missed doses, Trulicity 1.5mg SQ weekly with missed doses,   - A1c 10.4% on 9/6/23  - Endocrinology consulted on prior admission, rec Lantus 28 units qhs and Admelog 7/7/7 TID premeal  - c/w Lantus 14 units qhs   - start Admelog 3 units premeal TID and qhs  - c/w Admelog correctional scale premeal TID and qhs  - FS premeal TID and qhs  - increase gabapentin to 200mg TID for neuropathic pain Pt with a history of type 2 diabetes mellitus requiring insulin at home. Home regimen: Basaglar 35 units qhs with missed doses, Novolog 10 units premeal with missed doses, Trulicity 1.5mg SQ weekly with missed doses,   - A1c 10.4% on 9/6/23  - Endocrinology consulted on prior admission, rec Lantus 28 units qhs and Admelog 7/7/7 TID premeal  - c/w Lantus 14 units qhs   - Increase Admelog to 6 units TIDAC  - c/w Admelog correctional scale TIDAC and qhs  - FS premeal TID and qhs  - c/w gabapentin to 200mg TID for neuropathic pain

## 2023-09-25 NOTE — CONSULT NOTE ADULT - CONSULT REASON
Conversion from temp to permanent tunneled HD cath
ESRD needing HD
HD cathter placement
hypocalcemia

## 2023-09-25 NOTE — PROGRESS NOTE ADULT - SUBJECTIVE AND OBJECTIVE BOX
DIABETES FOLLOW UP NOTE: Saw pt earlier today    Chief Complaint: Endocrine consult requested for management of T2DM    INTERVAL HX: Saw pt while having HD, reports tolerating POs with BG levels above goal due to insulin underdosing and pt's nutritional indiscretions. Pt reports she had apple sauce before BG was tested and then had regular coke after dinner because she feels thirsty even though pt reports she shouldn't be drinking regular juices or soda. Complains of worsening neuropathic pain. At home on Gabapentin 100mg tid but this dose is not working any more. On chronic prednisone 10mg/day for sarcoidosis.       Review of Systems:  General: As above  Cardiovascular: No chest pain, palpitations  Respiratory: No SOB, no cough  GI: No nausea, vomiting, abdominal pain  Endocrine: No S&Sx of hypoglycemia    Allergies    No Known Allergies    Intolerances      MEDICATIONS:  atorvastatin 80 milliGRAM(s) Oral at bedtime  ergocalciferol 67330 Unit(s) Oral every week  insulin glargine Injectable (LANTUS) 17 Unit(s) SubCutaneous at bedtime  insulin lispro (ADMELOG) corrective regimen sliding scale   SubCutaneous at bedtime  insulin lispro (ADMELOG) corrective regimen sliding scale   SubCutaneous three times a day before meals  insulin lispro Injectable (ADMELOG) 6 Unit(s) SubCutaneous three times a day before meals  predniSONE   Tablet 10 milliGRAM(s) Oral daily  calcium acetate 667 milliGRAM(s) Oral three times a day with meals      PHYSICAL EXAM:  VITALS: T(C): 36.1 (09-25-23 @ 09:20)  T(F): 97 (09-25-23 @ 09:20), Max: 98.6 (09-25-23 @ 04:19)  HR: 88 (09-25-23 @ 09:20) (88 - 96)  BP: 128/73 (09-25-23 @ 09:20) (128/73 - 148/86)  RR:  (17 - 18)  SpO2:  (97% - 99%)  Wt(kg): --  GENERAL: Female laying in bed having HD in NAD  Abdomen: Soft, nontender, non distended, central adiposity  Extremities: Warm, no edema in all 4 exts  NEURO: A&O X3    LABS:  POCT Blood Glucose.: 404 mg/dL (09-25-23 @ 13:20)  POCT Blood Glucose.: 195 mg/dL (09-25-23 @ 08:23)  POCT Blood Glucose.: 413 mg/dL (09-24-23 @ 21:41)  POCT Blood Glucose.: 246 mg/dL (09-24-23 @ 17:07)  POCT Blood Glucose.: 322 mg/dL (09-24-23 @ 12:33)  POCT Blood Glucose.: 204 mg/dL (09-24-23 @ 09:06)  POCT Blood Glucose.: 239 mg/dL (09-23-23 @ 21:45)  POCT Blood Glucose.: 198 mg/dL (09-23-23 @ 17:32)  POCT Blood Glucose.: 266 mg/dL (09-23-23 @ 13:02)  POCT Blood Glucose.: 151 mg/dL (09-23-23 @ 08:34)  POCT Blood Glucose.: 179 mg/dL (09-22-23 @ 22:15)  POCT Blood Glucose.: 186 mg/dL (09-22-23 @ 17:21)                            7.4    6.25  )-----------( 177      ( 25 Sep 2023 07:10 )             22.2       09-25    138  |  97  |  72<H>  ----------------------------<  189<H>  4.7   |  25  |  7.46<H>    eGFR: 6<L>    Ca    8.6      09-25  Mg     2.3     09-25  Phos  4.2     09-25    TPro  6.1  /  Alb  3.3  /  TBili  0.1<L>  /  DBili  x   /  AST  18  /  ALT  13  /  AlkPhos  45  09-25      A1C with Estimated Average Glucose Result: 9.2 % (09-21-23 @ 05:16)  A1C with Estimated Average Glucose Result: 10.4 % (09-06-23 @ 17:15)      Estimated Average Glucose: 217 mg/dL (09-21-23 @ 05:16)  Estimated Average Glucose: 252 (09-06-23 @ 17:15)    Vitamin D, 25-Hydroxy: 7.5 ng/mL (09-07-23 @ 06:50)    Intact PTH: 138 pg/mL (09-23-23 @ 06:54)  Intact PTH: 7 pg/mL (09-21-23 @ 05:15)

## 2023-09-25 NOTE — PROGRESS NOTE ADULT - ATTENDING COMMENTS
Progressed CKD Stage V  was at [facility] early this month for dialysis initiation and she left ama  didn't feel well these past three days- fever, body aches, weakness so presented to [facility]  #viral syndrome now +rhinovirus; supportive management as needed  # ckd stage V-->ESRD  dolores placed 9/21  hd # 1 9/21,   tolerating hd mwf  hd today planned   #access- dolores; needs permacath/avf  pt refusing avf at this time  #needs outpt hd setup by SW  # anemia in ckd  +iron deficiency- s/p venofer 200mg iv x 5 days  started joseph with hd tiw  monitor hb trend  #hypocalcemia  pth low relatively for CKD- primary hypopth? component   #hyperphosphatemia-  phoslo 667mg po tid with meals; monitor phos trends  #met acidosis  monitor bicarb  #hyperkalemia  k stable today  low k diet  monitor k trend  d/w patient in detail

## 2023-09-25 NOTE — PROGRESS NOTE ADULT - PROBLEM SELECTOR PLAN 5
Vitamin D, 25-Hydroxy: 7.5 ng/mL (09-07-23 @ 06:50)  C/w ergocalciferol 96915 units qweekly x 6weeks   Follow up levels as out pt

## 2023-09-25 NOTE — PROGRESS NOTE ADULT - SUBJECTIVE AND OBJECTIVE BOX
[facility] DIVISION OF KIDNEY DISEASES AND HYPERTENSION -- HEMODIALYSIS NOTE  --------------------------------------------------------------------------------  Chief Complaint: ESRD/Ongoing hemodialysis requirement    24 hour events/subjective:  seen on hd  feeling better  no major complaints        PAST HISTORY  --------------------------------------------------------------------------------  No significant changes to PMH, PSH, FHx, SHx, unless otherwise noted    ALLERGIES & MEDICATIONS  --------------------------------------------------------------------------------  Allergies    No Known Allergies    Intolerances      Standing Inpatient Medications  amLODIPine   Tablet 10 milliGRAM(s) Oral daily  atorvastatin 80 milliGRAM(s) Oral at bedtime  calcium acetate 667 milliGRAM(s) Oral three times a day with meals  chlorhexidine 4% Liquid 1 Application(s) Topical <User Schedule>  dextrose 5%. 1000 milliLiter(s) IV Continuous <Continuous>  dextrose 5%. 1000 milliLiter(s) IV Continuous <Continuous>  dextrose 50% Injectable 12.5 Gram(s) IV Push once  dextrose 50% Injectable 25 Gram(s) IV Push once  dextrose 50% Injectable 25 Gram(s) IV Push once  epoetin suzette-epbx (RETACRIT) Injectable 4000 Unit(s) IV Push <User Schedule>  ergocalciferol 21923 Unit(s) Oral every week  famotidine    Tablet 20 milliGRAM(s) Oral daily  gabapentin 200 milliGRAM(s) Oral three times a day  glucagon  Injectable 1 milliGRAM(s) IntraMuscular once  hydrALAZINE 100 milliGRAM(s) Oral three times a day  influenza   Vaccine 0.5 milliLiter(s) IntraMuscular once  insulin glargine Injectable (LANTUS) 17 Unit(s) SubCutaneous at bedtime  insulin lispro (ADMELOG) corrective regimen sliding scale   SubCutaneous three times a day before meals  insulin lispro (ADMELOG) corrective regimen sliding scale   SubCutaneous at bedtime  insulin lispro Injectable (ADMELOG) 6 Unit(s) SubCutaneous three times a day before meals  iron sucrose IVPB 200 milliGRAM(s) IV Intermittent every 24 hours  melatonin 3 milliGRAM(s) Oral at bedtime  metoprolol succinate ER 25 milliGRAM(s) Oral daily  predniSONE   Tablet 10 milliGRAM(s) Oral daily  senna 2 Tablet(s) Oral at bedtime  tamsulosin 0.4 milliGRAM(s) Oral at bedtime    PRN Inpatient Medications  acetaminophen     Tablet .. 650 milliGRAM(s) Oral every 6 hours PRN  dextrose Oral Gel 15 Gram(s) Oral once PRN  sodium chloride 0.9% lock flush 10 milliLiter(s) IV Push every 1 hour PRN      REVIEW OF SYSTEMS  --------------------------------------------------------------------------------  Gen: No weight changes, fatigue, fevers/chills, weakness  Skin: No rashes  Head/Eyes/Ears/Mouth: No headache; Normal hearing; Normal vision w/o blurriness; No sinus pain/discomfort, sore throat  Respiratory: No dyspnea, cough, wheezing, hemoptysis  CV: No chest pain, PND, orthopnea  GI: No abdominal pain, diarrhea, constipation, nausea, vomiting, melena, hematochezia  : No increased frequency, dysuria, hematuria, nocturia  MSK: No joint pain/swelling; no back pain; no edema  Neuro: No dizziness/lightheadedness, weakness, seizures, numbness, tingling  Heme: No easy bruising or bleeding  Endo: No heat/cold intolerance  Psych: No significant nervousness, anxiety, stress, depression    All other systems were reviewed and are negative, except as noted.    VITALS/PHYSICAL EXAM  --------------------------------------------------------------------------------  T(C): 36.1 (09-25-23 @ 09:20), Max: 37 (09-25-23 @ 04:19)  HR: 88 (09-25-23 @ 09:20) (82 - 96)  BP: 128/73 (09-25-23 @ 09:20) (122/73 - 148/86)  RR: 17 (09-25-23 @ 09:20) (17 - 18)  SpO2: 99% (09-25-23 @ 09:20) (97% - 100%)  Wt(kg): --  Drug Dosing Weight  Height (cm): 157.5 (07 Sep 2023 05:36)  Weight (kg): 68 (21 Sep 2023 14:00)  BMI (kg/m2): 27.4 (21 Sep 2023 14:00)  BSA (m2): 1.69 (21 Sep 2023 14:00)        09-24-23 @ 07:01  -  09-25-23 @ 07:00  --------------------------------------------------------  IN: 0 mL / OUT: 1000 mL / NET: -1000 mL      Physical Exam:  	Gen: NAD,    	HEENT: no jvp  	Pulm: CTA B/L  	CV: RRR, S1S2; no rub  	Back:  no sacral edema  	Abd: +BS, soft   	: No suprapubic tenderness  	LE:   no edema  	Neuro: awake  	Psych: alert  	Skin: Warm,    	Vascular access: VIOLETA bernal    LABS/STUDIES  --------------------------------------------------------------------------------              7.4    6.25  >-----------<  177      [09-25-23 @ 07:10]              22.2     138  |  97  |  72  ----------------------------<  189      [09-25-23 @ 07:06]  4.7   |  25  |  7.46        Ca     8.6     [09-25-23 @ 07:06]      Mg     2.3     [09-25-23 @ 07:06]      Phos  4.2     [09-25-23 @ 07:06]    TPro  6.1  /  Alb  3.3  /  TBili  0.1  /  DBili  x   /  AST  18  /  ALT  13  /  AlkPhos  45  [09-25-23 @ 07:06]          Iron 14, TIBC 191, %sat 7      [09-21-23 @ 05:17]  Ferritin 1      [09-21-23 @ 05:15]  PTH -- (Ca 8.1)      [09-23-23 @ 06:54]   138  PTH -- (Ca 7.3)      [09-21-23 @ 05:15]   7  PTH -- (Ca 8.1)      [02-23-23 @ 06:50]   218  PTH -- (Ca 8.0)      [02-22-23 @ 17:33]   233  PTH -- (Ca 8.1)      [01-31-23 @ 12:34]   98  Vitamin D (25OH) 7.5      [09-07-23 @ 06:50]  HbA1c 11.2      [08-09-19 @ 21:20]  TSH 1.71      [01-28-23 @ 07:29]  Lipid: chol 254, , , LDL --      [02-21-23 @ 07:58]    HBsAb <3.0      [09-25-23 @ 07:07]  HBsAb Nonreact      [09-25-23 @ 07:07]  HBsAg Nonreact      [09-25-23 @ 07:07]  HBcAb Nonreact      [09-25-23 @ 07:07]  HCV 0.07, Nonreact      [09-25-23 @ 07:07]      RADIOLOGY:  --------------------------------------------------------------------------------------

## 2023-09-26 LAB
ANION GAP SERPL CALC-SCNC: 13 MMOL/L — SIGNIFICANT CHANGE UP (ref 5–17)
APPEARANCE UR: ABNORMAL
APTT BLD: 23.5 SEC — LOW (ref 24.5–35.6)
BACTERIA # UR AUTO: ABNORMAL
BILIRUB UR-MCNC: NEGATIVE — SIGNIFICANT CHANGE UP
BLD GP AB SCN SERPL QL: NEGATIVE — SIGNIFICANT CHANGE UP
BUN SERPL-MCNC: 39 MG/DL — HIGH (ref 7–23)
CALCIUM SERPL-MCNC: 8.6 MG/DL — SIGNIFICANT CHANGE UP (ref 8.4–10.5)
CHLORIDE SERPL-SCNC: 100 MMOL/L — SIGNIFICANT CHANGE UP (ref 96–108)
CO2 SERPL-SCNC: 28 MMOL/L — SIGNIFICANT CHANGE UP (ref 22–31)
COLOR SPEC: ABNORMAL
CREAT SERPL-MCNC: 4.77 MG/DL — HIGH (ref 0.5–1.3)
DIFF PNL FLD: ABNORMAL
EGFR: 10 ML/MIN/1.73M2 — LOW
EPI CELLS # UR: 5 /HPF — SIGNIFICANT CHANGE UP
GAMMA INTERFERON BACKGROUND BLD IA-ACNC: 0.02 IU/ML — SIGNIFICANT CHANGE UP
GLUCOSE BLDC GLUCOMTR-MCNC: 149 MG/DL — HIGH (ref 70–99)
GLUCOSE BLDC GLUCOMTR-MCNC: 156 MG/DL — HIGH (ref 70–99)
GLUCOSE BLDC GLUCOMTR-MCNC: 173 MG/DL — HIGH (ref 70–99)
GLUCOSE BLDC GLUCOMTR-MCNC: 184 MG/DL — HIGH (ref 70–99)
GLUCOSE BLDC GLUCOMTR-MCNC: 222 MG/DL — HIGH (ref 70–99)
GLUCOSE BLDC GLUCOMTR-MCNC: 237 MG/DL — HIGH (ref 70–99)
GLUCOSE BLDC GLUCOMTR-MCNC: 302 MG/DL — HIGH (ref 70–99)
GLUCOSE SERPL-MCNC: 126 MG/DL — HIGH (ref 70–99)
GLUCOSE UR QL: ABNORMAL
HCT VFR BLD CALC: 22.4 % — LOW (ref 34.5–45)
HGB BLD-MCNC: 7.4 G/DL — LOW (ref 11.5–15.5)
HYALINE CASTS # UR AUTO: 1 /LPF — SIGNIFICANT CHANGE UP (ref 0–2)
INR BLD: 0.95 RATIO — SIGNIFICANT CHANGE UP (ref 0.85–1.18)
KETONES UR-MCNC: NEGATIVE — SIGNIFICANT CHANGE UP
LEUKOCYTE ESTERASE UR-ACNC: ABNORMAL
M TB IFN-G BLD-IMP: NEGATIVE — SIGNIFICANT CHANGE UP
M TB IFN-G CD4+ BCKGRND COR BLD-ACNC: -0.01 IU/ML — SIGNIFICANT CHANGE UP
M TB IFN-G CD4+CD8+ BCKGRND COR BLD-ACNC: -0.01 IU/ML — SIGNIFICANT CHANGE UP
MAGNESIUM SERPL-MCNC: 2.1 MG/DL — SIGNIFICANT CHANGE UP (ref 1.6–2.6)
MCHC RBC-ENTMCNC: 28.6 PG — SIGNIFICANT CHANGE UP (ref 27–34)
MCHC RBC-ENTMCNC: 33 GM/DL — SIGNIFICANT CHANGE UP (ref 32–36)
MCV RBC AUTO: 86.5 FL — SIGNIFICANT CHANGE UP (ref 80–100)
NITRITE UR-MCNC: POSITIVE
NRBC # BLD: 0 /100 WBCS — SIGNIFICANT CHANGE UP (ref 0–0)
PH UR: 7.5 — SIGNIFICANT CHANGE UP (ref 5–8)
PHOSPHATE SERPL-MCNC: 2.6 MG/DL — SIGNIFICANT CHANGE UP (ref 2.5–4.5)
PLATELET # BLD AUTO: 203 K/UL — SIGNIFICANT CHANGE UP (ref 150–400)
POTASSIUM SERPL-MCNC: 4.2 MMOL/L — SIGNIFICANT CHANGE UP (ref 3.5–5.3)
POTASSIUM SERPL-SCNC: 4.2 MMOL/L — SIGNIFICANT CHANGE UP (ref 3.5–5.3)
PROT UR-MCNC: >600
PROTHROM AB SERPL-ACNC: 10 SEC — SIGNIFICANT CHANGE UP (ref 9.5–13)
QUANT TB PLUS MITOGEN MINUS NIL: 1.24 IU/ML — SIGNIFICANT CHANGE UP
RBC # BLD: 2.59 M/UL — LOW (ref 3.8–5.2)
RBC # FLD: 12.2 % — SIGNIFICANT CHANGE UP (ref 10.3–14.5)
RBC CASTS # UR COMP ASSIST: 21 /HPF — HIGH (ref 0–4)
RH IG SCN BLD-IMP: NEGATIVE — SIGNIFICANT CHANGE UP
SODIUM SERPL-SCNC: 141 MMOL/L — SIGNIFICANT CHANGE UP (ref 135–145)
SP GR SPEC: 1.02 — SIGNIFICANT CHANGE UP (ref 1.01–1.02)
UROBILINOGEN FLD QL: NEGATIVE — SIGNIFICANT CHANGE UP
WBC # BLD: 10.75 K/UL — HIGH (ref 3.8–10.5)
WBC # FLD AUTO: 10.75 K/UL — HIGH (ref 3.8–10.5)
WBC UR QL: 835 /HPF — HIGH (ref 0–5)

## 2023-09-26 PROCEDURE — 99232 SBSQ HOSP IP/OBS MODERATE 35: CPT | Mod: GC

## 2023-09-26 PROCEDURE — 36558 INSERT TUNNELED CV CATH: CPT

## 2023-09-26 PROCEDURE — 77001 FLUOROGUIDE FOR VEIN DEVICE: CPT | Mod: 26

## 2023-09-26 RX ORDER — ONDANSETRON 8 MG/1
4 TABLET, FILM COATED ORAL ONCE
Refills: 0 | Status: DISCONTINUED | OUTPATIENT
Start: 2023-09-26 | End: 2023-09-28

## 2023-09-26 RX ADMIN — GABAPENTIN 200 MILLIGRAM(S): 400 CAPSULE ORAL at 05:24

## 2023-09-26 RX ADMIN — Medication 650 MILLIGRAM(S): at 20:51

## 2023-09-26 RX ADMIN — TAMSULOSIN HYDROCHLORIDE 0.4 MILLIGRAM(S): 0.4 CAPSULE ORAL at 21:42

## 2023-09-26 RX ADMIN — Medication 650 MILLIGRAM(S): at 00:52

## 2023-09-26 RX ADMIN — Medication 3 MILLIGRAM(S): at 22:37

## 2023-09-26 RX ADMIN — Medication 2: at 17:36

## 2023-09-26 RX ADMIN — Medication 2: at 21:41

## 2023-09-26 RX ADMIN — Medication 10 MILLIGRAM(S): at 05:25

## 2023-09-26 RX ADMIN — Medication 100 MILLIGRAM(S): at 21:39

## 2023-09-26 RX ADMIN — GABAPENTIN 200 MILLIGRAM(S): 400 CAPSULE ORAL at 16:05

## 2023-09-26 RX ADMIN — FAMOTIDINE 20 MILLIGRAM(S): 10 INJECTION INTRAVENOUS at 16:04

## 2023-09-26 RX ADMIN — Medication 650 MILLIGRAM(S): at 21:21

## 2023-09-26 RX ADMIN — Medication 100 MILLIGRAM(S): at 16:04

## 2023-09-26 RX ADMIN — ATORVASTATIN CALCIUM 80 MILLIGRAM(S): 80 TABLET, FILM COATED ORAL at 21:39

## 2023-09-26 RX ADMIN — Medication 2: at 11:58

## 2023-09-26 RX ADMIN — CHLORHEXIDINE GLUCONATE 1 APPLICATION(S): 213 SOLUTION TOPICAL at 05:25

## 2023-09-26 RX ADMIN — Medication 10 UNIT(S): at 17:36

## 2023-09-26 RX ADMIN — AMLODIPINE BESYLATE 10 MILLIGRAM(S): 2.5 TABLET ORAL at 05:25

## 2023-09-26 RX ADMIN — Medication 667 MILLIGRAM(S): at 17:37

## 2023-09-26 RX ADMIN — Medication 100 MILLIGRAM(S): at 05:24

## 2023-09-26 RX ADMIN — INSULIN GLARGINE 22 UNIT(S): 100 INJECTION, SOLUTION SUBCUTANEOUS at 21:40

## 2023-09-26 RX ADMIN — GABAPENTIN 200 MILLIGRAM(S): 400 CAPSULE ORAL at 21:40

## 2023-09-26 RX ADMIN — Medication 25 MILLIGRAM(S): at 05:24

## 2023-09-26 NOTE — PROGRESS NOTE ADULT - ASSESSMENT
Patient is a 54 y/o F w/ a PMHx of ESRD not on HD, poorly controlled Type 2 DM on insulin, HTN, and sarcoidosis, with a recent admission at [facility] (9/6-9/7) for HD initiation but left AMA before HD was started, who is presenting with chills and lower back pain, found to be enterorhinovirus positive on RVP with leukocytosis, as well as anemia and elevated BUN/creatinine, admitted for HD initiation and suspected viral pneumonia.

## 2023-09-26 NOTE — PROGRESS NOTE ADULT - PROBLEM SELECTOR PLAN 4
Pt with a history of anemia in the setting of ESRD. Baseline hemoglobin ~7-8.   - Hb on admission 6.7, received 1u pRBCs  - Iron low, ferritin low  - c/w oral ferrous sulfate

## 2023-09-26 NOTE — PROGRESS NOTE ADULT - PROBLEM SELECTOR PLAN 11
- DVT PPx: heparin subq  - Diet: Renal, consistent carb  - GOC: full code  - Pharmacy: Rx pharmacy on [address]  - Dispo: outpatient dialysis, pending acceptance (prefers [facility])

## 2023-09-26 NOTE — PRE-ANESTHESIA EVALUATION ADULT - NSRADCARDRESULTSFT_GEN_ALL_CORE
< from: Transthoracic Echocardiogram (02.21.23 @ 08:11) >    Observations:  Mitral Valve: Mitral annular and leaflet calcification.  Minimal mitral regurgitation.  Aortic Valve/Aorta: Normal aortic valve.  Normal aortic root size.  Left Atrium: Moderately dilated left atrium.  Left Ventricle: Normal left ventricular internal dimensions  and wall thicknesses.  Normal left ventricular systolic function. No segmental  wall motion abnormalities.  Left ventricular filling pressure is moderately elevated  ("pseudonormal" mitral inflow pattern).  Right Heart: Normal right atrium. Prominent Chiari network.  Normal right ventricular size and function.  Normal tricuspid valve. Mild tricuspid regurgitation.  Normal pulmonic valve. Minimal pulmonic regurgitation.  Pericardium/Pleura: Normal pericardium with no pericardial  effusion.  Hemodynamic: Estimated right atrial pressure is mildly  elevated.  Mild pulmonary hypertension. Estimated PASP 44 mmHg.  ------------------------------------------------------------------------  Conclusions:  Normal left ventricular systolic function. No segmental  wall motion abnormalities.  Left ventricular filling pressure is moderately elevated  ("pseudonormal" mitral inflow pattern).  Mild pulmonary hypertension.    < end of copied text >
Reviewed.

## 2023-09-26 NOTE — PROGRESS NOTE ADULT - ATTENDING COMMENTS
-Some dysuria s/p [place] removal yesterday, but reports otherwise urinating well. -F/u UA. C/w flomax for now.   -Renal recs appreciated; f/u phos in am. If becomes low, can stop Phoslo.   -Endo appreciated for DM mgmt.   -IR for conversion to Permacath today.   -D/w CM regarding DCP in progress - outpt HD being set up.

## 2023-09-26 NOTE — PRE-ANESTHESIA EVALUATION ADULT - NSANTHPEFT_GEN_ALL_CORE
GENERAL: NAD  NECK: Full ROM  CHEST/LUNG: Clear to auscultation bilaterally  HEART: Normal S1/S2
Gen: No acute distress, fatigued  Pulm: breathing comfortably on room air  Cor: regular rate

## 2023-09-26 NOTE — PRE-ANESTHESIA EVALUATION ADULT - NSANTHAIRWAYFT_ENT_ALL_CORE
full neck extension, no limits to neck ROM; denies h/o c-spine injury or surgery
TMD > 3FB's. Teeth intact. Neck full ROM.

## 2023-09-26 NOTE — PROGRESS NOTE ADULT - SUBJECTIVE AND OBJECTIVE BOX
PROGRESS NOTE:   Patient is a 53y old  Female who presents with a chief complaint of Chills (25 Sep 2023 15:32)    SUBJECTIVE / OVERNIGHT EVENTS:        MEDICATIONS  (STANDING):  amLODIPine   Tablet 10 milliGRAM(s) Oral daily  atorvastatin 80 milliGRAM(s) Oral at bedtime  calcium acetate 667 milliGRAM(s) Oral three times a day with meals  chlorhexidine 4% Liquid 1 Application(s) Topical <User Schedule>  dextrose 5%. 1000 milliLiter(s) (100 mL/Hr) IV Continuous <Continuous>  dextrose 5%. 1000 milliLiter(s) (50 mL/Hr) IV Continuous <Continuous>  dextrose 50% Injectable 12.5 Gram(s) IV Push once  dextrose 50% Injectable 25 Gram(s) IV Push once  dextrose 50% Injectable 25 Gram(s) IV Push once  epoetin suzette-epbx (RETACRIT) Injectable 4000 Unit(s) IV Push <User Schedule>  ergocalciferol 99324 Unit(s) Oral every week  famotidine    Tablet 20 milliGRAM(s) Oral daily  gabapentin 200 milliGRAM(s) Oral three times a day  glucagon  Injectable 1 milliGRAM(s) IntraMuscular once  hydrALAZINE 100 milliGRAM(s) Oral three times a day  influenza   Vaccine 0.5 milliLiter(s) IntraMuscular once  insulin glargine Injectable (LANTUS) 22 Unit(s) SubCutaneous at bedtime  insulin lispro (ADMELOG) corrective regimen sliding scale   SubCutaneous <User Schedule>  insulin lispro (ADMELOG) corrective regimen sliding scale   SubCutaneous three times a day before meals  insulin lispro Injectable (ADMELOG) 10 Unit(s) SubCutaneous three times a day before meals  melatonin 3 milliGRAM(s) Oral at bedtime  metoprolol succinate ER 25 milliGRAM(s) Oral daily  predniSONE   Tablet 10 milliGRAM(s) Oral daily  senna 2 Tablet(s) Oral at bedtime  tamsulosin 0.4 milliGRAM(s) Oral at bedtime    MEDICATIONS  (PRN):  acetaminophen     Tablet .. 650 milliGRAM(s) Oral every 6 hours PRN Mild Pain (1 - 3)  dextrose Oral Gel 15 Gram(s) Oral once PRN Blood Glucose LESS THAN 70 milliGRAM(s)/deciliter  sodium chloride 0.9% lock flush 10 milliLiter(s) IV Push every 1 hour PRN Pre/post blood products, medications, blood draw, and to maintain line patency      CAPILLARY BLOOD GLUCOSE  POCT Blood Glucose.: 156 mg/dL (26 Sep 2023 03:10)  POCT Blood Glucose.: 134 mg/dL (25 Sep 2023 21:36)  POCT Blood Glucose.: 323 mg/dL (25 Sep 2023 17:33)  POCT Blood Glucose.: 404 mg/dL (25 Sep 2023 13:20)  POCT Blood Glucose.: 195 mg/dL (25 Sep 2023 08:23)    I&O's Summary  25 Sep 2023 07:01  -  26 Sep 2023 07:00  --------------------------------------------------------  IN: 0 mL / OUT: 700 mL / NET: -700 mL      PHYSICAL EXAM:  Vital Signs Last 24 Hrs  T(C): 36.9 (26 Sep 2023 04:27), Max: 37.5 (25 Sep 2023 23:50)  T(F): 98.4 (26 Sep 2023 04:27), Max: 99.5 (25 Sep 2023 23:50)  HR: 98 (26 Sep 2023 04:27) (88 - 107)  BP: 154/76 (26 Sep 2023 04:27) (128/73 - 167/90)  BP(mean): --  RR: 18 (26 Sep 2023 04:27) (17 - 18)  SpO2: 96% (26 Sep 2023 04:27) (96% - 100%)  Parameters below as of 26 Sep 2023 04:27  Patient On (Oxygen Delivery Method): room air    CONSTITUTIONAL: NAD, well-developed  HEENT: PERRLA, EOMI, moist mucous membranes.  NECK: Supple. No JVD. +R IJ Shiley, site appears c/d/i.  RESPIRATORY: Normal respiratory effort, Lungs CTAB  CARDIOVASCULAR: Regular rate and rhythm with normal S1 and S2. No murmurs.  ABDOMEN: Soft, non-tender, non-distended. Normoactive bowel sounds, no rebound/guarding; No hepatosplenomegaly  EXTREMITIES: Mild non-pitting BLE edema. 2+ peripheral pulses. No clubbing, cyanosis.  MUSCULOSKELETAL: No joint swelling or tenderness to palpation  SKIN: No rashes or lesions  NEUROLOGIC: A&Ox3. No focal deficits.  PSYCH: Affect appropriate      LABS:                        7.4    10.75 )-----------( 203      ( 26 Sep 2023 04:29 )             22.4     09-26  141  |  100  |  39<H>  ----------------------------<  126<H>  4.2   |  28  |  4.77<H>    Ca    8.6      26 Sep 2023 04:29  Phos  2.6     09-26  Mg     2.1     09-26    TPro  6.1  /  Alb  3.3  /  TBili  0.1<L>  /  DBili  x   /  AST  18  /  ALT  13  /  AlkPhos  45  09-25    PT/INR - ( 26 Sep 2023 04:29 )   PT: 10.0 sec;   INR: 0.95 ratio    PTT - ( 26 Sep 2023 04:29 )  PTT:23.5 sec    Urinalysis Basic - ( 26 Sep 2023 04:29 )  Color: x / Appearance: x / SG: x / pH: x  Gluc: 126 mg/dL / Ketone: x  / Bili: x / Urobili: x   Blood: x / Protein: x / Nitrite: x   Leuk Esterase: x / RBC: x / WBC x   Sq Epi: x / Non Sq Epi: x / Bacteria: x        RADIOLOGY & ADDITIONAL TESTS:  Results Reviewed:   Imaging Personally Reviewed:  Electrocardiogram Personally Reviewed:    COORDINATION OF CARE:  Care Discussed with Consultants/Other Providers [Y/N]:  Prior or Outpatient Records Reviewed [Y/N]: PROGRESS NOTE:   Patient is a 53y old  Female who presents with a chief complaint of Chills (25 Sep 2023 15:32)    SUBJECTIVE / OVERNIGHT EVENTS:  No acute events overnight. Patient still endorsing neuropathic pain in her legs, mildly improved with the increased dosage of gabapentin. Also endorsing some burning on urination. Patient denies any fever, chills, SOB, chest pain, abdominal pain, nausea/vomiting, diarrhea, or urinary symptoms.      MEDICATIONS  (STANDING):  amLODIPine   Tablet 10 milliGRAM(s) Oral daily  atorvastatin 80 milliGRAM(s) Oral at bedtime  calcium acetate 667 milliGRAM(s) Oral three times a day with meals  chlorhexidine 4% Liquid 1 Application(s) Topical <User Schedule>  dextrose 5%. 1000 milliLiter(s) (100 mL/Hr) IV Continuous <Continuous>  dextrose 5%. 1000 milliLiter(s) (50 mL/Hr) IV Continuous <Continuous>  dextrose 50% Injectable 12.5 Gram(s) IV Push once  dextrose 50% Injectable 25 Gram(s) IV Push once  dextrose 50% Injectable 25 Gram(s) IV Push once  epoetin suzette-epbx (RETACRIT) Injectable 4000 Unit(s) IV Push <User Schedule>  ergocalciferol 65009 Unit(s) Oral every week  famotidine    Tablet 20 milliGRAM(s) Oral daily  gabapentin 200 milliGRAM(s) Oral three times a day  glucagon  Injectable 1 milliGRAM(s) IntraMuscular once  hydrALAZINE 100 milliGRAM(s) Oral three times a day  influenza   Vaccine 0.5 milliLiter(s) IntraMuscular once  insulin glargine Injectable (LANTUS) 22 Unit(s) SubCutaneous at bedtime  insulin lispro (ADMELOG) corrective regimen sliding scale   SubCutaneous <User Schedule>  insulin lispro (ADMELOG) corrective regimen sliding scale   SubCutaneous three times a day before meals  insulin lispro Injectable (ADMELOG) 10 Unit(s) SubCutaneous three times a day before meals  melatonin 3 milliGRAM(s) Oral at bedtime  metoprolol succinate ER 25 milliGRAM(s) Oral daily  predniSONE   Tablet 10 milliGRAM(s) Oral daily  senna 2 Tablet(s) Oral at bedtime  tamsulosin 0.4 milliGRAM(s) Oral at bedtime    MEDICATIONS  (PRN):  acetaminophen     Tablet .. 650 milliGRAM(s) Oral every 6 hours PRN Mild Pain (1 - 3)  dextrose Oral Gel 15 Gram(s) Oral once PRN Blood Glucose LESS THAN 70 milliGRAM(s)/deciliter  sodium chloride 0.9% lock flush 10 milliLiter(s) IV Push every 1 hour PRN Pre/post blood products, medications, blood draw, and to maintain line patency      CAPILLARY BLOOD GLUCOSE  POCT Blood Glucose.: 156 mg/dL (26 Sep 2023 03:10)  POCT Blood Glucose.: 134 mg/dL (25 Sep 2023 21:36)  POCT Blood Glucose.: 323 mg/dL (25 Sep 2023 17:33)  POCT Blood Glucose.: 404 mg/dL (25 Sep 2023 13:20)  POCT Blood Glucose.: 195 mg/dL (25 Sep 2023 08:23)    I&O's Summary  25 Sep 2023 07:01  -  26 Sep 2023 07:00  --------------------------------------------------------  IN: 0 mL / OUT: 700 mL / NET: -700 mL      PHYSICAL EXAM:  Vital Signs Last 24 Hrs  T(C): 36.9 (26 Sep 2023 04:27), Max: 37.5 (25 Sep 2023 23:50)  T(F): 98.4 (26 Sep 2023 04:27), Max: 99.5 (25 Sep 2023 23:50)  HR: 98 (26 Sep 2023 04:27) (88 - 107)  BP: 154/76 (26 Sep 2023 04:27) (128/73 - 167/90)  BP(mean): --  RR: 18 (26 Sep 2023 04:27) (17 - 18)  SpO2: 96% (26 Sep 2023 04:27) (96% - 100%)  Parameters below as of 26 Sep 2023 04:27  Patient On (Oxygen Delivery Method): room air    CONSTITUTIONAL: NAD, well-developed  HEENT: PERRLA, EOMI, moist mucous membranes.  NECK: Supple. No JVD. +R IJ Shiley, site appears c/d/i.  RESPIRATORY: Normal respiratory effort, Lungs CTAB  CARDIOVASCULAR: Regular rate and rhythm with normal S1 and S2. No murmurs.  ABDOMEN: Soft, non-tender, non-distended. Normoactive bowel sounds, no rebound/guarding; No hepatosplenomegaly  EXTREMITIES: Mild non-pitting BLE edema. 2+ peripheral pulses. No clubbing, cyanosis.  MUSCULOSKELETAL: No joint swelling or tenderness to palpation  SKIN: No rashes or lesions  NEUROLOGIC: A&Ox3. No focal deficits.  PSYCH: Affect appropriate      LABS:                        7.4    10.75 )-----------( 203      ( 26 Sep 2023 04:29 )             22.4     09-26  141  |  100  |  39<H>  ----------------------------<  126<H>  4.2   |  28  |  4.77<H>    Ca    8.6      26 Sep 2023 04:29  Phos  2.6     09-26  Mg     2.1     09-26    TPro  6.1  /  Alb  3.3  /  TBili  0.1<L>  /  DBili  x   /  AST  18  /  ALT  13  /  AlkPhos  45  09-25    PT/INR - ( 26 Sep 2023 04:29 )   PT: 10.0 sec;   INR: 0.95 ratio    PTT - ( 26 Sep 2023 04:29 )  PTT:23.5 sec    Urinalysis Basic - ( 26 Sep 2023 04:29 )  Color: x / Appearance: x / SG: x / pH: x  Gluc: 126 mg/dL / Ketone: x  / Bili: x / Urobili: x   Blood: x / Protein: x / Nitrite: x   Leuk Esterase: x / RBC: x / WBC x   Sq Epi: x / Non Sq Epi: x / Bacteria: x        RADIOLOGY & ADDITIONAL TESTS:  Results Reviewed:   Imaging Personally Reviewed:  Electrocardiogram Personally Reviewed:    COORDINATION OF CARE:  Care Discussed with Consultants/Other Providers [Y/N]:  Prior or Outpatient Records Reviewed [Y/N]:

## 2023-09-26 NOTE — PRE-ANESTHESIA EVALUATION ADULT - NSANTHOSAYNRD_GEN_A_CORE
No. SHANNAN screening performed.  STOP BANG Legend: 0-2 = LOW Risk; 3-4 = INTERMEDIATE Risk; 5-8 = HIGH Risk
No. SHANNAN screening performed.  STOP BANG Legend: 0-2 = LOW Risk; 3-4 = INTERMEDIATE Risk; 5-8 = HIGH Risk

## 2023-09-26 NOTE — PRE-OP CHECKLIST - RESPIRATORY RATE (BREATHS/MIN)
Patient called to say that nausea continues. Her preferred pharmacy is [address] [place] [address].    99

## 2023-09-26 NOTE — PROGRESS NOTE ADULT - PROBLEM SELECTOR PLAN 1
Pt w/ a history of ESRD, follows with Dr. Jose M Garibay outpt. Prior hospitalization at [facility] 9/6-9/7 for HD initiation, but left AMA. , Cr 10.92 on admission now.  - Pt states that she is unwilling to have the Shiley placed without being put to sleep for it  - Nephrology consulted, recommend initiation of HD on this admission w/ Shiley placement by IR  - c/w phoslo 667mg PO TID premeals for hyperphosphatemia  - c/w sodium bicabonate 650mg PO TID  - R IJ Shiley placed 9/21 with first session of HD, 2nd HD session 9/22, 3rd HD session 9/25  - Flomax started 9/23 to help with voiding (retention on admission, difficult rai), TOV 9/25 Pt w/ a history of ESRD, follows with Dr. Jose M Garibay outpt. Prior hospitalization at [facility] 9/6-9/7 for HD initiation, but left AMA. , Cr 10.92 on admission now.  - Pt states that she is unwilling to have the Shiley placed without being put to sleep for it  - Nephrology consulted, recommend initiation of HD on this admission w/ Shiley placement by IR  - c/w phoslo 667mg PO TID premeals for hyperphosphatemia  - c/w sodium bicabonate 650mg PO TID  - R IJ Shiley placed 9/21 with first session of HD, 2nd HD session 9/22, 3rd HD session 9/25  - Flomax started 9/23 to help with voiding (retention on admission, difficult rai), TOV 9/25  - Permacath planned for 9/26

## 2023-09-26 NOTE — PROGRESS NOTE ADULT - PROBLEM SELECTOR PLAN 7
Patient with a history of hypertension, on home amlodipine 10mg qd, hydralazine 100mg TID, Toprol XL 25mg qd.   - c/w patient's home BP regimen  - Will hold patient's home diuretics (bumetanide, metolazone) given ESRD status

## 2023-09-26 NOTE — PRE-OP CHECKLIST - ANTIBIOTIC
If you receive a survey via e-mail or mail, please take the time to complete and return as your feedback is very helpful to our practice.     If your neurological testing is not going to be scheduled today our Pre-Service Department will contact you to schedule within one to two days. If you would like to call and schedule when it is convenient for you or if you need to reschedule your appointment please call the Pre-Service Department at [phone].    Your tests will be reviewed by the Benefits Department and if there are any concerns regarding prior authorization or financial obligation they will contact you. We recommend you still contact your insurance company to see if the test, provider, and location of service are covered, and if so, will there be any out of pocket expenses.     If you should have any concerns regarding the financial obligation of the tests please contact our Financial Advocates at [phone] and they will be happy to assist you.                 Thank you for letting us care for you today.      n/a

## 2023-09-26 NOTE — PROGRESS NOTE ADULT - PROBLEM SELECTOR PLAN 6
Pt with a history of type 2 diabetes mellitus requiring insulin at home. Home regimen: Basaglar 35 units qhs with missed doses, Novolog 10 units premeal with missed doses, Trulicity 1.5mg SQ weekly with missed doses,   - A1c 10.4% on 9/6/23  - Endocrinology consulted on prior admission, rec Lantus 28 units qhs and Admelog 7/7/7 TID premeal  - c/w Lantus 14 units qhs   - Increase Admelog to 6 units TIDAC  - c/w Admelog correctional scale TIDAC and qhs  - FS premeal TID and qhs  - c/w gabapentin to 200mg TID for neuropathic pain

## 2023-09-26 NOTE — PACU DISCHARGE NOTE - COMMENTS
No anesthesia complications.  Patient cleared for discharge from PACU s/p anesthesia. Ultimate disposition from PACU per primary service.

## 2023-09-26 NOTE — PROGRESS NOTE ADULT - PROBLEM SELECTOR PLAN 3
Pt w/ hypocalcemia, low PTH, and low vitamin D, likely multifactorial i/s/o ESRD w/ elevated phos, severe vitamin D deficiency, and possible hypoparathyroidism (versus low PTH i/s/o electrolyte abnormalities)  - Endocrinology consulted, appreciate recs  - Repeat PTH elevated, more consistent with secondary hyperparathyroidism in the setting of ESRD  - c/w ergocalciferol 90007 units qweekly

## 2023-09-27 DIAGNOSIS — N39.0 URINARY TRACT INFECTION, SITE NOT SPECIFIED: ICD-10-CM

## 2023-09-27 LAB
ANION GAP SERPL CALC-SCNC: 15 MMOL/L — SIGNIFICANT CHANGE UP (ref 5–17)
BASOPHILS # BLD AUTO: 0.02 K/UL — SIGNIFICANT CHANGE UP (ref 0–0.2)
BASOPHILS NFR BLD AUTO: 0.2 % — SIGNIFICANT CHANGE UP (ref 0–2)
BUN SERPL-MCNC: 53 MG/DL — HIGH (ref 7–23)
CALCIUM SERPL-MCNC: 8.3 MG/DL — LOW (ref 8.4–10.5)
CHLORIDE SERPL-SCNC: 101 MMOL/L — SIGNIFICANT CHANGE UP (ref 96–108)
CO2 SERPL-SCNC: 25 MMOL/L — SIGNIFICANT CHANGE UP (ref 22–31)
CREAT SERPL-MCNC: 6.19 MG/DL — HIGH (ref 0.5–1.3)
EGFR: 8 ML/MIN/1.73M2 — LOW
EOSINOPHIL # BLD AUTO: 0.16 K/UL — SIGNIFICANT CHANGE UP (ref 0–0.5)
EOSINOPHIL NFR BLD AUTO: 1.7 % — SIGNIFICANT CHANGE UP (ref 0–6)
GLUCOSE BLDC GLUCOMTR-MCNC: 123 MG/DL — HIGH (ref 70–99)
GLUCOSE BLDC GLUCOMTR-MCNC: 138 MG/DL — HIGH (ref 70–99)
GLUCOSE BLDC GLUCOMTR-MCNC: 165 MG/DL — HIGH (ref 70–99)
GLUCOSE BLDC GLUCOMTR-MCNC: 206 MG/DL — HIGH (ref 70–99)
GLUCOSE SERPL-MCNC: 94 MG/DL — SIGNIFICANT CHANGE UP (ref 70–99)
HCT VFR BLD CALC: 23.4 % — LOW (ref 34.5–45)
HGB BLD-MCNC: 7.6 G/DL — LOW (ref 11.5–15.5)
IMM GRANULOCYTES NFR BLD AUTO: 2 % — HIGH (ref 0–0.9)
LYMPHOCYTES # BLD AUTO: 2.04 K/UL — SIGNIFICANT CHANGE UP (ref 1–3.3)
LYMPHOCYTES # BLD AUTO: 21.3 % — SIGNIFICANT CHANGE UP (ref 13–44)
MAGNESIUM SERPL-MCNC: 2.1 MG/DL — SIGNIFICANT CHANGE UP (ref 1.6–2.6)
MCHC RBC-ENTMCNC: 28.9 PG — SIGNIFICANT CHANGE UP (ref 27–34)
MCHC RBC-ENTMCNC: 32.5 GM/DL — SIGNIFICANT CHANGE UP (ref 32–36)
MCV RBC AUTO: 89 FL — SIGNIFICANT CHANGE UP (ref 80–100)
MONOCYTES # BLD AUTO: 0.73 K/UL — SIGNIFICANT CHANGE UP (ref 0–0.9)
MONOCYTES NFR BLD AUTO: 7.6 % — SIGNIFICANT CHANGE UP (ref 2–14)
NEUTROPHILS # BLD AUTO: 6.42 K/UL — SIGNIFICANT CHANGE UP (ref 1.8–7.4)
NEUTROPHILS NFR BLD AUTO: 67.2 % — SIGNIFICANT CHANGE UP (ref 43–77)
NRBC # BLD: 0 /100 WBCS — SIGNIFICANT CHANGE UP (ref 0–0)
PHOSPHATE SERPL-MCNC: 3.2 MG/DL — SIGNIFICANT CHANGE UP (ref 2.5–4.5)
PLATELET # BLD AUTO: 255 K/UL — SIGNIFICANT CHANGE UP (ref 150–400)
POTASSIUM SERPL-MCNC: 4.1 MMOL/L — SIGNIFICANT CHANGE UP (ref 3.5–5.3)
POTASSIUM SERPL-SCNC: 4.1 MMOL/L — SIGNIFICANT CHANGE UP (ref 3.5–5.3)
RBC # BLD: 2.63 M/UL — LOW (ref 3.8–5.2)
RBC # FLD: 12.5 % — SIGNIFICANT CHANGE UP (ref 10.3–14.5)
SODIUM SERPL-SCNC: 141 MMOL/L — SIGNIFICANT CHANGE UP (ref 135–145)
WBC # BLD: 9.56 K/UL — SIGNIFICANT CHANGE UP (ref 3.8–10.5)
WBC # FLD AUTO: 9.56 K/UL — SIGNIFICANT CHANGE UP (ref 3.8–10.5)

## 2023-09-27 PROCEDURE — 99232 SBSQ HOSP IP/OBS MODERATE 35: CPT | Mod: GC

## 2023-09-27 PROCEDURE — 99232 SBSQ HOSP IP/OBS MODERATE 35: CPT

## 2023-09-27 PROCEDURE — 99233 SBSQ HOSP IP/OBS HIGH 50: CPT

## 2023-09-27 RX ORDER — LACTOBACILLUS ACIDOPHILUS 100MM CELL
1 CAPSULE ORAL
Refills: 0 | Status: DISCONTINUED | OUTPATIENT
Start: 2023-09-27 | End: 2023-09-28

## 2023-09-27 RX ORDER — CEFTRIAXONE 500 MG/1
INJECTION, POWDER, FOR SOLUTION INTRAMUSCULAR; INTRAVENOUS
Refills: 0 | Status: DISCONTINUED | OUTPATIENT
Start: 2023-09-27 | End: 2023-09-28

## 2023-09-27 RX ORDER — CEFTRIAXONE 500 MG/1
1000 INJECTION, POWDER, FOR SOLUTION INTRAMUSCULAR; INTRAVENOUS ONCE
Refills: 0 | Status: COMPLETED | OUTPATIENT
Start: 2023-09-27 | End: 2023-09-27

## 2023-09-27 RX ORDER — CEFTRIAXONE 500 MG/1
1000 INJECTION, POWDER, FOR SOLUTION INTRAMUSCULAR; INTRAVENOUS EVERY 24 HOURS
Refills: 0 | Status: DISCONTINUED | OUTPATIENT
Start: 2023-09-28 | End: 2023-09-28

## 2023-09-27 RX ORDER — METOPROLOL TARTRATE 50 MG
25 TABLET ORAL ONCE
Refills: 0 | Status: COMPLETED | OUTPATIENT
Start: 2023-09-27 | End: 2023-09-27

## 2023-09-27 RX ORDER — INSULIN GLARGINE 100 [IU]/ML
20 INJECTION, SOLUTION SUBCUTANEOUS AT BEDTIME
Refills: 0 | Status: DISCONTINUED | OUTPATIENT
Start: 2023-09-27 | End: 2023-09-28

## 2023-09-27 RX ORDER — INSULIN LISPRO 100/ML
2 VIAL (ML) SUBCUTANEOUS AT BEDTIME
Refills: 0 | Status: DISCONTINUED | OUTPATIENT
Start: 2023-09-27 | End: 2023-09-28

## 2023-09-27 RX ORDER — OXYCODONE HYDROCHLORIDE 5 MG/1
2.5 TABLET ORAL ONCE
Refills: 0 | Status: DISCONTINUED | OUTPATIENT
Start: 2023-09-27 | End: 2023-09-28

## 2023-09-27 RX ADMIN — CEFTRIAXONE 100 MILLIGRAM(S): 500 INJECTION, POWDER, FOR SOLUTION INTRAMUSCULAR; INTRAVENOUS at 07:56

## 2023-09-27 RX ADMIN — GABAPENTIN 200 MILLIGRAM(S): 400 CAPSULE ORAL at 21:02

## 2023-09-27 RX ADMIN — FAMOTIDINE 20 MILLIGRAM(S): 10 INJECTION INTRAVENOUS at 12:47

## 2023-09-27 RX ADMIN — Medication 10 UNIT(S): at 08:51

## 2023-09-27 RX ADMIN — Medication 1: at 12:48

## 2023-09-27 RX ADMIN — Medication 10 UNIT(S): at 12:48

## 2023-09-27 RX ADMIN — GABAPENTIN 200 MILLIGRAM(S): 400 CAPSULE ORAL at 14:07

## 2023-09-27 RX ADMIN — Medication 10 MILLIGRAM(S): at 06:12

## 2023-09-27 RX ADMIN — CHLORHEXIDINE GLUCONATE 1 APPLICATION(S): 213 SOLUTION TOPICAL at 07:57

## 2023-09-27 RX ADMIN — Medication 1 TABLET(S): at 13:01

## 2023-09-27 RX ADMIN — Medication 100 MILLIGRAM(S): at 14:07

## 2023-09-27 RX ADMIN — TAMSULOSIN HYDROCHLORIDE 0.4 MILLIGRAM(S): 0.4 CAPSULE ORAL at 21:03

## 2023-09-27 RX ADMIN — ATORVASTATIN CALCIUM 80 MILLIGRAM(S): 80 TABLET, FILM COATED ORAL at 21:02

## 2023-09-27 RX ADMIN — GABAPENTIN 200 MILLIGRAM(S): 400 CAPSULE ORAL at 06:12

## 2023-09-27 RX ADMIN — Medication 2 UNIT(S): at 21:59

## 2023-09-27 RX ADMIN — ERYTHROPOIETIN 4000 UNIT(S): 10000 INJECTION, SOLUTION INTRAVENOUS; SUBCUTANEOUS at 16:37

## 2023-09-27 RX ADMIN — Medication 25 MILLIGRAM(S): at 18:57

## 2023-09-27 RX ADMIN — INSULIN GLARGINE 20 UNIT(S): 100 INJECTION, SOLUTION SUBCUTANEOUS at 21:58

## 2023-09-27 RX ADMIN — Medication 650 MILLIGRAM(S): at 17:40

## 2023-09-27 RX ADMIN — Medication 667 MILLIGRAM(S): at 07:57

## 2023-09-27 RX ADMIN — Medication 650 MILLIGRAM(S): at 18:40

## 2023-09-27 RX ADMIN — Medication 3 MILLIGRAM(S): at 21:58

## 2023-09-27 RX ADMIN — Medication 667 MILLIGRAM(S): at 12:48

## 2023-09-27 RX ADMIN — Medication 100 MILLIGRAM(S): at 21:01

## 2023-09-27 NOTE — PROGRESS NOTE ADULT - ATTENDING COMMENTS
-UA positive, has dysuria. -CTX starting pending UCx. Can give pyridium renally dosed. Probiotics.   -Right permcath site looks intact. Some tenderness at site. Can use oxycodone 2.5mg po x 1. tylenol prn.   -HD today per renal. -D/w CM regarding outpt HD set up. They are pending acceptance/confirmation.

## 2023-09-27 NOTE — PROGRESS NOTE ADULT - PROBLEM SELECTOR PLAN 1
- Test BG ac, hs and 2am. Q6h when NPO  - Decrease Lantus to 20 units q hs. Administer 18 units for NPO tonight  - C/w admelog 10 units TIDAC . HOLD IF NPO/NOT EATING  -Add Admelog 2 units with sanck at night/OLD IF NOT HAIVNG SNACK  - Continue with Low dose admelog correction scale TIDQAC and separate low dose scale QHS and 2am  - Will continue to adjust insulin as needed   Discharge:   Basal/bolus insulin (dose TBD)   Make sure pt has Rx for all DM supplies and insulin  Can follow at endo practice. [address] [facility] [address]. Phone . Call for apt closer to discharge  Needs Optho/renal f/u

## 2023-09-27 NOTE — PROGRESS NOTE ADULT - PROBLEM SELECTOR PLAN 12
- DVT PPx: heparin subq  - Diet: Renal, consistent carb  - GOC: full code  - Pharmacy: Rx pharmacy on [address]  - Dispo: outpatient dialysis, pending acceptance (prefers [facility])

## 2023-09-27 NOTE — PROGRESS NOTE ADULT - PROBLEM SELECTOR PLAN 3
Pt w/ hypocalcemia, low PTH, and low vitamin D, likely multifactorial i/s/o ESRD w/ elevated phos, severe vitamin D deficiency, and possible hypoparathyroidism (versus low PTH i/s/o electrolyte abnormalities)  - Endocrinology consulted, appreciate recs  - Repeat PTH elevated, more consistent with secondary hyperparathyroidism in the setting of ESRD  - c/w ergocalciferol 24149 units qweekly Pt reporting dysuria on 9/26, UA grossly positive.  - Started ceftriaxone for 3 day course (9/27- )

## 2023-09-27 NOTE — PROGRESS NOTE ADULT - PROBLEM SELECTOR PLAN 11
- DVT PPx: heparin subq  - Diet: Renal, consistent carb  - GOC: full code  - Pharmacy: Rx pharmacy on [address]  - Dispo: outpatient dialysis, pending acceptance (prefers [facility]) Pt reports a history of having seizures for 1 day roughly 5 years ago. States that she was taking antiepileptic medication up until last year, when she stopped because she felt well. Does not remember name of medication.  - Will monitor for signs/symptoms of seizure

## 2023-09-27 NOTE — PROGRESS NOTE ADULT - SUBJECTIVE AND OBJECTIVE BOX
DIABETES FOLLOW UP NOTE: Saw pt earlier today    Chief Complaint: Endocrine consult requested for management of T2DM    INTERVAL HX: Pt reports tolerating POs with BG levels 100s while NPO and 300s last night at bedtime. Pt reports she came back from procedure around dinner time> ate some food from tray and later family came with more food> BG at bedtime in 300s. BG improved today. FBG 94 per BMP> pt received 2 units of Admelog last night for bg >300s. Noted juices and snacks at bedside. Pt reports she takes just a little bit of them here and there (Yohoo chocolate milk, apple juice and Takis). On chronic prednisone 10mg/day for sarcoidosis.         Review of Systems:  General: As above  Cardiovascular: No chest pain, palpitations  Respiratory: No SOB, no cough  GI: No nausea, vomiting, abdominal pain  Endocrine: No polyuria, polydipsia or S&Sx of hypoglycemia    Allergies    No Known Allergies    Intolerances      MEDICATIONS:  atorvastatin 80 milliGRAM(s) Oral at bedtime    ergocalciferol 22032 Unit(s) Oral every week  insulin glargine Injectable (LANTUS) 22 Unit(s) SubCutaneous at bedtime  insulin lispro (ADMELOG) corrective regimen sliding scale   SubCutaneous three times a day before meals  insulin lispro (ADMELOG) corrective regimen sliding scale   SubCutaneous <User Schedule>  insulin lispro Injectable (ADMELOG) 10 Unit(s) SubCutaneous three times a day before meals  predniSONE   Tablet 10 milliGRAM(s) Oral daily      PHYSICAL EXAM:  VITALS: T(C): 36.2 (09-27-23 @ 15:55)  T(F): 97.2 (09-27-23 @ 15:55), Max: 98.4 (09-26-23 @ 20:08)  HR: 106 (09-27-23 @ 15:55) (95 - 106)  BP: 145/88 (09-27-23 @ 15:55) (127/72 - 145/88)  RR:  (16 - 18)  SpO2:  (96% - 100%)  Wt(kg): --  GENERAL: Female laying in bed having HD in NAD  Chest: R tunneled HD cath in place with dressing D&I  Abdomen: Soft, nontender, non distended, central adiposity  Extremities: Warm, no edema in all 4 exts  NEURO: A&O X3    LABS:  POCT Blood Glucose.: 165 mg/dL (09-27-23 @ 12:37)  POCT Blood Glucose.: 123 mg/dL (09-27-23 @ 08:26)  POCT Blood Glucose.: 138 mg/dL (09-27-23 @ 04:11)  POCT Blood Glucose.: 302 mg/dL (09-26-23 @ 21:22)  POCT Blood Glucose.: 237 mg/dL (09-26-23 @ 17:20)  POCT Blood Glucose.: 173 mg/dL (09-26-23 @ 14:18)  POCT Blood Glucose.: 184 mg/dL (09-26-23 @ 13:07)  POCT Blood Glucose.: 222 mg/dL (09-26-23 @ 11:55)  POCT Blood Glucose.: 149 mg/dL (09-26-23 @ 09:02)  POCT Blood Glucose.: 156 mg/dL (09-26-23 @ 03:10)  POCT Blood Glucose.: 134 mg/dL (09-25-23 @ 21:36)  POCT Blood Glucose.: 323 mg/dL (09-25-23 @ 17:33)  POCT Blood Glucose.: 404 mg/dL (09-25-23 @ 13:20)  POCT Blood Glucose.: 195 mg/dL (09-25-23 @ 08:23)  POCT Blood Glucose.: 413 mg/dL (09-24-23 @ 21:41)                            7.6    9.56  )-----------( 255      ( 27 Sep 2023 07:04 )             23.4       09-27    141  |  101  |  53<H>  ----------------------------<  94  4.1   |  25  |  6.19<H>    eGFR: 8<L>    Ca    8.3<L>      09-27  Mg     2.1     09-27  Phos  3.2     09-27    TPro  6.1  /  Alb  3.3  /  TBili  0.1<L>  /  DBili  x   /  AST  18  /  ALT  13  /  AlkPhos  45  09-25    A1C with Estimated Average Glucose Result: 9.2 % (09-21-23 @ 05:16)  A1C with Estimated Average Glucose Result: 10.4 % (09-06-23 @ 17:15)      Estimated Average Glucose: 217 mg/dL (09-21-23 @ 05:16)  Estimated Average Glucose: 252 (09-06-23 @ 17:15)

## 2023-09-27 NOTE — PROGRESS NOTE ADULT - ASSESSMENT
Patient is a 54 y/o F w/ a PMHx of ESRD not on HD, poorly controlled Type 2 DM on insulin, HTN, and sarcoidosis, with a recent admission at [facility] (9/6-9/7) for HD initiation but left AMA before HD was started, who is presenting with chills and lower back pain, found to be enterorhinovirus positive on RVP with leukocytosis, as well as anemia and elevated BUN/creatinine, admitted for HD initiation and suspected viral pneumonia.

## 2023-09-27 NOTE — PROGRESS NOTE ADULT - ASSESSMENT
53 F w/h/o uncontrolled T2DM (A1C: 9.2%) on basal/bolus insulin PTA. DM c/b ESRD not on HD, neuropathy. Also h/o HTN, and sarcoidosis on Prednisone 10mg at home. Recent admission at [facility] (9/6-9/7) for HD initiation but left AMA before HD was started. Here with chills and lower back pain, LE edema. Found to have + enterorhinovirus with  leukocytosis, as well as anemia, elevated BUN/creatinine, admitted for HD initiation and suspected viral pneumonia. Endocrinology consulted for hypocalcemia and T2D management. Also found to have Hyperparathyroidism and Vitamin D deficiency. S/p tunneled HD cath 9/26/23. Tolerating POs with BG levels variable depending on PO intake with on/off nutritional indiscretions. FBG coming down so will preventively decrease basal insulin and add bedtime snack insulin low dose in case pt eats at night> pt made aware of changes. BG goal of 100 to 180s. No hypoglycemia        Spoke to pt about the need for diet adherence> pt states she knows about diet but unable to verbalize if she can follow it. Also discussed effects of HD on glucose control and the likely need of insulin adjustments upon discharge. Pt verbalized understanding.     Home regimen: Basaglar 35 units qhs and Admelog 10-15 units TIDAC

## 2023-09-27 NOTE — PROVIDER CONTACT NOTE (OTHER) - SITUATION
Patient has an HR of 107
Patient currently on HD and complained about pain on her b/l foot. Tylenol was given as ordered. Patient remains alert and has requested to come off HD. Nephrologist also notified.

## 2023-09-27 NOTE — PROVIDER CONTACT NOTE (OTHER) - ASSESSMENT
Patient remains alert on room air. 2 hours HD completed with 400cc fluid removal. Patient tachy at 114's to 119's. Patient denies chest pain and denies sob.

## 2023-09-27 NOTE — PROGRESS NOTE ADULT - PROBLEM SELECTOR PLAN 6
Pt with a history of type 2 diabetes mellitus requiring insulin at home. Home regimen: Basaglar 35 units qhs with missed doses, Novolog 10 units premeal with missed doses, Trulicity 1.5mg SQ weekly with missed doses,   - A1c 10.4% on 9/6/23  - Endocrinology consulted on prior admission, rec Lantus 28 units qhs and Admelog 7/7/7 TID premeal  - c/w Lantus 14 units qhs   - Increase Admelog to 6 units TIDAC  - c/w Admelog correctional scale TIDAC and qhs  - FS premeal TID and qhs  - c/w gabapentin to 200mg TID for neuropathic pain Pt endorsing new onset 10/10 back pain starting yesterday, with focal spinal tenderness on palpation of lumbar spine (L2-L4).  - Xray lumbar spine w/ no fractures/dislocations  - Pain control as needed

## 2023-09-27 NOTE — PROGRESS NOTE ADULT - SUBJECTIVE AND OBJECTIVE BOX
[facility] DIVISION OF KIDNEY DISEASES AND HYPERTENSION -- FOLLOW UP NOTE  --------------------------------------------------------------------------------  Chief Complaint:/subjective:  no complaints  feels well    24 hour events:  permacath placed by IR /26      PAST HISTORY  --------------------------------------------------------------------------------  No significant changes to PMH, PSH, FHx, SHx, unless otherwise noted    ALLERGIES & MEDICATIONS  --------------------------------------------------------------------------------  Allergies    No Known Allergies    Intolerances      Standing Inpatient Medications  amLODIPine   Tablet 10 milliGRAM(s) Oral daily  atorvastatin 80 milliGRAM(s) Oral at bedtime  calcium acetate 667 milliGRAM(s) Oral three times a day with meals  cefTRIAXone   IVPB      chlorhexidine 4% Liquid 1 Application(s) Topical <User Schedule>  dextrose 5%. 1000 milliLiter(s) IV Continuous <Continuous>  dextrose 5%. 1000 milliLiter(s) IV Continuous <Continuous>  dextrose 50% Injectable 12.5 Gram(s) IV Push once  dextrose 50% Injectable 25 Gram(s) IV Push once  dextrose 50% Injectable 25 Gram(s) IV Push once  epoetin suzette-epbx (RETACRIT) Injectable 4000 Unit(s) IV Push <User Schedule>  ergocalciferol 51739 Unit(s) Oral every week  famotidine    Tablet 20 milliGRAM(s) Oral daily  gabapentin 200 milliGRAM(s) Oral three times a day  glucagon  Injectable 1 milliGRAM(s) IntraMuscular once  hydrALAZINE 100 milliGRAM(s) Oral three times a day  influenza   Vaccine 0.5 milliLiter(s) IntraMuscular once  insulin glargine Injectable (LANTUS) 22 Unit(s) SubCutaneous at bedtime  insulin lispro (ADMELOG) corrective regimen sliding scale   SubCutaneous <User Schedule>  insulin lispro (ADMELOG) corrective regimen sliding scale   SubCutaneous three times a day before meals  insulin lispro Injectable (ADMELOG) 10 Unit(s) SubCutaneous three times a day before meals  melatonin 3 milliGRAM(s) Oral at bedtime  metoprolol succinate ER 25 milliGRAM(s) Oral daily  predniSONE   Tablet 10 milliGRAM(s) Oral daily  senna 2 Tablet(s) Oral at bedtime  tamsulosin 0.4 milliGRAM(s) Oral at bedtime    PRN Inpatient Medications  acetaminophen     Tablet .. 650 milliGRAM(s) Oral every 6 hours PRN  dextrose Oral Gel 15 Gram(s) Oral once PRN  ondansetron Injectable 4 milliGRAM(s) IV Push once PRN  ondansetron Injectable 4 milliGRAM(s) IV Push once PRN  sodium chloride 0.9% lock flush 10 milliLiter(s) IV Push every 1 hour PRN      REVIEW OF SYSTEMS  --------------------------------------------------------------------------------  Gen: No weight changes, fatigue, fevers/chills, weakness  Skin: No rashes  Head/Eyes/Ears/Mouth: No headache;   Respiratory: No dyspnea, cough  CV: No chest pain, PND, orthopnea  GI: No abdominal pain, diarrhea, constipation, nausea, vomiting  : No increased frequency, dysuria, hematuria, nocturia  MSK: No joint pain/swelling; no back pain; no edema  Neuro: No dizziness/lightheadedness, weakness  Heme: No easy bruising or bleeding  Psych: No significant nervousness, anxiety, stress, depression    All other systems were reviewed and are negative, except as noted.    VITALS/PHYSICAL EXAM  --------------------------------------------------------------------------------  T(C): 36.8 (09-27-23 @ 05:36), Max: 36.9 (09-26-23 @ 20:08)  HR: 105 (09-27-23 @ 05:36) (88 - 105)  BP: 130/72 (09-27-23 @ 05:36) (93/63 - 154/82)  RR: 18 (09-27-23 @ 05:36) (13 - 99)  SpO2: 97% (09-27-23 @ 05:36) (94% - 100%)  Wt(kg): --  Adult Advanced Hemodynamics Last 24 Hrs  ABP: --  ABP(mean): --  CVP(mm Hg): --  CO: --  CI: --  PA: --  PA(mean): --  PCWP: --  SVR: --  SVRI: --    Weight (kg): 68 (09-26-23 @ 13:31)      09-27-23 @ 07:01  -  09-27-23 @ 11:00  --------------------------------------------------------  IN: 240 mL / OUT: 0 mL / NET: 240 mL      Physical Exam:  	Gen: NAD,    	HEENT: , no jvp  	Pulm: CTA B/L  	CV: RRR, S1S2; no rub  	Back:  no sacral edema  	Abd: +BS, soft, nontender/nondistended  	: No suprapubic tenderness  	Ext: no edema  	Neuro: awake  	Psych: alert  	Skin: Warm   	Vascular access: permacath right     LABS/STUDIES  --------------------------------------------------------------------------------              7.6    9.56  >-----------<  255      [09-27-23 @ 07:04]              23.4     Hemoglobin: 7.6 g/dL (09-27-23 @ 07:04)  Hemoglobin: 7.4 g/dL (09-26-23 @ 04:29)    Platelet Count - Automated: 255 K/uL (09-27-23 @ 07:04)  Platelet Count - Automated: 203 K/uL (09-26-23 @ 04:29)    141  |  101  |  53  ----------------------------<  94      [09-27-23 @ 07:01]  4.1   |  25  |  6.19        Ca     8.3     [09-27-23 @ 07:01]      Mg     2.1     [09-27-23 @ 07:01]      Phos  3.2     [09-27-23 @ 07:01]      PT/INR: PT 10.0 , INR 0.95       [09-26-23 @ 04:29]  PTT: 23.5       [09-26-23 @ 04:29]      Creatinine Trend:  SCr 6.19 [09-27 @ 07:01]  SCr 4.77 [09-26 @ 04:29]  SCr 7.46 [09-25 @ 07:06]  SCr 6.88 [09-24 @ 07:50]  SCr 4.87 [09-23 @ 06:58]    Urinalysis - [09-27-23 @ 07:01]      Color  / Appearance  / SG  / pH       Gluc 94 / Ketone   / Bili  / Urobili        Blood  / Protein  / Leuk Est  / Nitrite       RBC  / WBC  / Hyaline  / Gran  / Sq Epi  / Non Sq Epi  / Bacteria       Iron 14, TIBC 191, %sat 7      [09-21-23 @ 05:17]  Ferritin 1      [09-21-23 @ 05:15]  PTH -- (Ca 8.1)      [09-23-23 @ 06:54]   138  PTH -- (Ca 7.3)      [09-21-23 @ 05:15]   7  PTH -- (Ca 8.1)      [02-23-23 @ 06:50]   218  PTH -- (Ca 8.0)      [02-22-23 @ 17:33]   233  PTH -- (Ca 8.1)      [01-31-23 @ 12:34]   98  Vitamin D (25OH) 7.5      [09-07-23 @ 06:50]  HbA1c 11.2      [08-09-19 @ 21:20]  TSH 1.71      [01-28-23 @ 07:29]  Lipid: chol 254, , , LDL --      [02-21-23 @ 07:58]    HBsAb <3.0      [09-25-23 @ 07:07]  HBsAb Nonreact      [09-25-23 @ 07:07]  HBsAg Nonreact      [09-25-23 @ 07:07]  HBcAb Nonreact      [09-25-23 @ 07:07]  HCV 0.07, Nonreact      [09-25-23 @ 07:07]

## 2023-09-27 NOTE — PROGRESS NOTE ADULT - SUBJECTIVE AND OBJECTIVE BOX
PROGRESS NOTE:   Patient is a 53y old  Female who presents with a chief complaint of Chills (26 Sep 2023 07:06)    SUBJECTIVE / OVERNIGHT EVENTS:  No acute events overnight.      MEDICATIONS  (STANDING):  amLODIPine   Tablet 10 milliGRAM(s) Oral daily  atorvastatin 80 milliGRAM(s) Oral at bedtime  calcium acetate 667 milliGRAM(s) Oral three times a day with meals  chlorhexidine 4% Liquid 1 Application(s) Topical <User Schedule>  dextrose 5%. 1000 milliLiter(s) (100 mL/Hr) IV Continuous <Continuous>  dextrose 5%. 1000 milliLiter(s) (50 mL/Hr) IV Continuous <Continuous>  dextrose 50% Injectable 12.5 Gram(s) IV Push once  dextrose 50% Injectable 25 Gram(s) IV Push once  dextrose 50% Injectable 25 Gram(s) IV Push once  epoetin suzette-epbx (RETACRIT) Injectable 4000 Unit(s) IV Push <User Schedule>  ergocalciferol 30611 Unit(s) Oral every week  famotidine    Tablet 20 milliGRAM(s) Oral daily  gabapentin 200 milliGRAM(s) Oral three times a day  glucagon  Injectable 1 milliGRAM(s) IntraMuscular once  hydrALAZINE 100 milliGRAM(s) Oral three times a day  influenza   Vaccine 0.5 milliLiter(s) IntraMuscular once  insulin glargine Injectable (LANTUS) 22 Unit(s) SubCutaneous at bedtime  insulin lispro (ADMELOG) corrective regimen sliding scale   SubCutaneous <User Schedule>  insulin lispro (ADMELOG) corrective regimen sliding scale   SubCutaneous three times a day before meals  insulin lispro Injectable (ADMELOG) 10 Unit(s) SubCutaneous three times a day before meals  melatonin 3 milliGRAM(s) Oral at bedtime  metoprolol succinate ER 25 milliGRAM(s) Oral daily  predniSONE   Tablet 10 milliGRAM(s) Oral daily  senna 2 Tablet(s) Oral at bedtime  tamsulosin 0.4 milliGRAM(s) Oral at bedtime    MEDICATIONS  (PRN):  acetaminophen     Tablet .. 650 milliGRAM(s) Oral every 6 hours PRN Mild Pain (1 - 3)  dextrose Oral Gel 15 Gram(s) Oral once PRN Blood Glucose LESS THAN 70 milliGRAM(s)/deciliter  ondansetron Injectable 4 milliGRAM(s) IV Push once PRN Nausea and/or Vomiting  ondansetron Injectable 4 milliGRAM(s) IV Push once PRN Nausea and/or Vomiting  sodium chloride 0.9% lock flush 10 milliLiter(s) IV Push every 1 hour PRN Pre/post blood products, medications, blood draw, and to maintain line patency      CAPILLARY BLOOD GLUCOSE  POCT Blood Glucose.: 138 mg/dL (27 Sep 2023 04:11)  POCT Blood Glucose.: 302 mg/dL (26 Sep 2023 21:22)  POCT Blood Glucose.: 237 mg/dL (26 Sep 2023 17:20)  POCT Blood Glucose.: 173 mg/dL (26 Sep 2023 14:18)  POCT Blood Glucose.: 184 mg/dL (26 Sep 2023 13:07)  POCT Blood Glucose.: 222 mg/dL (26 Sep 2023 11:55)  POCT Blood Glucose.: 149 mg/dL (26 Sep 2023 09:02)      PHYSICAL EXAM:  Vital Signs Last 24 Hrs  T(C): 36.8 (27 Sep 2023 05:36), Max: 36.9 (26 Sep 2023 20:08)  T(F): 98.2 (27 Sep 2023 05:36), Max: 98.4 (26 Sep 2023 20:08)  HR: 105 (27 Sep 2023 05:36) (88 - 105)  BP: 130/72 (27 Sep 2023 05:36) (93/63 - 154/82)  BP(mean): 104 (26 Sep 2023 15:05) (73 - 104)  RR: 18 (27 Sep 2023 05:36) (13 - 99)  SpO2: 97% (27 Sep 2023 05:36) (94% - 100%)  Parameters below as of 27 Sep 2023 05:36  Patient On (Oxygen Delivery Method): room air    CONSTITUTIONAL: NAD, well-developed  HEENT: PERRLA, EOMI, moist mucous membranes.  NECK: Supple. No JVD. +R IJ Shiley, site appears c/d/i.  RESPIRATORY: Normal respiratory effort, Lungs CTAB  CARDIOVASCULAR: Regular rate and rhythm with normal S1 and S2. No murmurs.  ABDOMEN: Soft, non-tender, non-distended. Normoactive bowel sounds, no rebound/guarding; No hepatosplenomegaly  EXTREMITIES: Mild non-pitting BLE edema. 2+ peripheral pulses. No clubbing, cyanosis.  MUSCULOSKELETAL: No joint swelling or tenderness to palpation  SKIN: No rashes or lesions  NEUROLOGIC: A&Ox3. No focal deficits.  PSYCH: Affect appropriate      LABS:                        7.4    10.75 )-----------( 203      ( 26 Sep 2023 04:29 )             22.4     -  141  |  100  |  39<H>  ----------------------------<  126<H>  4.2   |  28  |  4.77<H>    Ca    8.6      26 Sep 2023 04:29  Phos  2.6       Mg     2.1         PT/INR - ( 26 Sep 2023 04:29 )   PT: 10.0 sec;   INR: 0.95 ratio    PTT - ( 26 Sep 2023 04:29 )  PTT:23.5 sec    Urinalysis Basic - ( 26 Sep 2023 22:23 )  Color: Light Orange / Appearance: Slightly Turbid / S.018 / pH: x  Gluc: x / Ketone: Negative  / Bili: Negative / Urobili: Negative   Blood: x / Protein: >600 / Nitrite: Positive   Leuk Esterase: Large / RBC: 21 /hpf /  /HPF   Sq Epi: x / Non Sq Epi: x / Bacteria: Many        RADIOLOGY & ADDITIONAL TESTS:  Results Reviewed:   Imaging Personally Reviewed:  Electrocardiogram Personally Reviewed:    COORDINATION OF CARE:  Care Discussed with Consultants/Other Providers [Y/N]:  Prior or Outpatient Records Reviewed [Y/N]: PROGRESS NOTE:   Patient is a 53y old  Female who presents with a chief complaint of Chills (26 Sep 2023 07:06)    SUBJECTIVE / OVERNIGHT EVENTS:  No acute events overnight. Patient tolerated the Permacath procedure well yesterday. Currently states that she feels well and would like to go home as soon as possible. Patient denies any fever, chills, SOB, chest pain, abdominal pain, nausea/vomiting, or diarrhea.      MEDICATIONS  (STANDING):  amLODIPine   Tablet 10 milliGRAM(s) Oral daily  atorvastatin 80 milliGRAM(s) Oral at bedtime  calcium acetate 667 milliGRAM(s) Oral three times a day with meals  chlorhexidine 4% Liquid 1 Application(s) Topical <User Schedule>  dextrose 5%. 1000 milliLiter(s) (100 mL/Hr) IV Continuous <Continuous>  dextrose 5%. 1000 milliLiter(s) (50 mL/Hr) IV Continuous <Continuous>  dextrose 50% Injectable 12.5 Gram(s) IV Push once  dextrose 50% Injectable 25 Gram(s) IV Push once  dextrose 50% Injectable 25 Gram(s) IV Push once  epoetin suzette-epbx (RETACRIT) Injectable 4000 Unit(s) IV Push <User Schedule>  ergocalciferol 55913 Unit(s) Oral every week  famotidine    Tablet 20 milliGRAM(s) Oral daily  gabapentin 200 milliGRAM(s) Oral three times a day  glucagon  Injectable 1 milliGRAM(s) IntraMuscular once  hydrALAZINE 100 milliGRAM(s) Oral three times a day  influenza   Vaccine 0.5 milliLiter(s) IntraMuscular once  insulin glargine Injectable (LANTUS) 22 Unit(s) SubCutaneous at bedtime  insulin lispro (ADMELOG) corrective regimen sliding scale   SubCutaneous <User Schedule>  insulin lispro (ADMELOG) corrective regimen sliding scale   SubCutaneous three times a day before meals  insulin lispro Injectable (ADMELOG) 10 Unit(s) SubCutaneous three times a day before meals  melatonin 3 milliGRAM(s) Oral at bedtime  metoprolol succinate ER 25 milliGRAM(s) Oral daily  predniSONE   Tablet 10 milliGRAM(s) Oral daily  senna 2 Tablet(s) Oral at bedtime  tamsulosin 0.4 milliGRAM(s) Oral at bedtime    MEDICATIONS  (PRN):  acetaminophen     Tablet .. 650 milliGRAM(s) Oral every 6 hours PRN Mild Pain (1 - 3)  dextrose Oral Gel 15 Gram(s) Oral once PRN Blood Glucose LESS THAN 70 milliGRAM(s)/deciliter  ondansetron Injectable 4 milliGRAM(s) IV Push once PRN Nausea and/or Vomiting  ondansetron Injectable 4 milliGRAM(s) IV Push once PRN Nausea and/or Vomiting  sodium chloride 0.9% lock flush 10 milliLiter(s) IV Push every 1 hour PRN Pre/post blood products, medications, blood draw, and to maintain line patency      CAPILLARY BLOOD GLUCOSE  POCT Blood Glucose.: 138 mg/dL (27 Sep 2023 04:11)  POCT Blood Glucose.: 302 mg/dL (26 Sep 2023 21:22)  POCT Blood Glucose.: 237 mg/dL (26 Sep 2023 17:20)  POCT Blood Glucose.: 173 mg/dL (26 Sep 2023 14:18)  POCT Blood Glucose.: 184 mg/dL (26 Sep 2023 13:07)  POCT Blood Glucose.: 222 mg/dL (26 Sep 2023 11:55)  POCT Blood Glucose.: 149 mg/dL (26 Sep 2023 09:02)      PHYSICAL EXAM:  Vital Signs Last 24 Hrs  T(C): 36.8 (27 Sep 2023 05:36), Max: 36.9 (26 Sep 2023 20:08)  T(F): 98.2 (27 Sep 2023 05:36), Max: 98.4 (26 Sep 2023 20:08)  HR: 105 (27 Sep 2023 05:36) (88 - 105)  BP: 130/72 (27 Sep 2023 05:36) (93/63 - 154/82)  BP(mean): 104 (26 Sep 2023 15:05) (73 - 104)  RR: 18 (27 Sep 2023 05:36) (13 - 99)  SpO2: 97% (27 Sep 2023 05:36) (94% - 100%)  Parameters below as of 27 Sep 2023 05:36  Patient On (Oxygen Delivery Method): room air    CONSTITUTIONAL: NAD, well-developed  HEENT: PERRLA, EOMI, moist mucous membranes.  NECK: Supple. No JVD. +R IJ Shiley, site appears c/d/i.  RESPIRATORY: Normal respiratory effort, Lungs CTAB  CARDIOVASCULAR: Regular rate and rhythm with normal S1 and S2. No murmurs.  ABDOMEN: Soft, non-tender, non-distended. Normoactive bowel sounds, no rebound/guarding; No hepatosplenomegaly  EXTREMITIES: Mild non-pitting BLE edema. 2+ peripheral pulses. No clubbing, cyanosis.  MUSCULOSKELETAL: No joint swelling or tenderness to palpation  SKIN: No rashes or lesions  NEUROLOGIC: A&Ox3. No focal deficits.  PSYCH: Affect appropriate      LABS:                        7.4    10.75 )-----------( 203      ( 26 Sep 2023 04:29 )             22.4     09-  141  |  100  |  39<H>  ----------------------------<  126<H>  4.2   |  28  |  4.77<H>    Ca    8.6      26 Sep 2023 04:29  Phos  2.6       Mg     2.1         PT/INR - ( 26 Sep 2023 04:29 )   PT: 10.0 sec;   INR: 0.95 ratio    PTT - ( 26 Sep 2023 04:29 )  PTT:23.5 sec    Urinalysis Basic - ( 26 Sep 2023 22:23 )  Color: Light Orange / Appearance: Slightly Turbid / S.018 / pH: x  Gluc: x / Ketone: Negative  / Bili: Negative / Urobili: Negative   Blood: x / Protein: >600 / Nitrite: Positive   Leuk Esterase: Large / RBC: 21 /hpf /  /HPF   Sq Epi: x / Non Sq Epi: x / Bacteria: Many        RADIOLOGY & ADDITIONAL TESTS:  Results Reviewed:   Imaging Personally Reviewed:  Electrocardiogram Personally Reviewed:    COORDINATION OF CARE:  Care Discussed with Consultants/Other Providers [Y/N]:  Prior or Outpatient Records Reviewed [Y/N]:

## 2023-09-27 NOTE — PROGRESS NOTE ADULT - PROBLEM SELECTOR PLAN 1
Pt w/ a history of ESRD, follows with Dr. Jose M Garibay outpt. Prior hospitalization at [facility] 9/6-9/7 for HD initiation, but left AMA. , Cr 10.92 on admission now.  - Pt states that she is unwilling to have the Shiley placed without being put to sleep for it  - Nephrology consulted, recommend initiation of HD on this admission w/ Shiley placement by IR  - c/w phoslo 667mg PO TID premeals for hyperphosphatemia  - c/w sodium bicabonate 650mg PO TID  - R IJ Shiley placed 9/21 with first session of HD, 2nd HD session 9/22, 3rd HD session 9/25  - Flomax started 9/23 to help with voiding (retention on admission, difficult rai), TOV 9/25  - Permacath placed on 9/26 Pt w/ a history of ESRD, follows with Dr. Jose M Garibay outpt. Prior hospitalization at [facility] 9/6-9/7 for HD initiation, but left AMA. , Cr 10.92 on admission now.  - Pt states that she is unwilling to have the Shiley placed without being put to sleep for it  - Nephrology consulted, recommend initiation of HD on this admission w/ Shiley placement by IR  - c/w phoslo 667mg PO TID premeals for hyperphosphatemia  - c/w sodium bicabonate 650mg PO TID  - R IJ Shiley placed 9/21 with first session of HD, 2nd HD session 9/22, 3rd HD session 9/25  - Flomax started 9/23 to help with voiding (retention on admission, difficult ria), TOV 9/25  - Permacath placed on 9/26, plan for HD 9/27

## 2023-09-27 NOTE — PROGRESS NOTE ADULT - PROBLEM SELECTOR PLAN 8
Pt w/ a history of HLD, on home rosuvastatin 40mg qd  - c/w atorvastatin 40mg qhs Patient with a history of hypertension, on home amlodipine 10mg qd, hydralazine 100mg TID, Toprol XL 25mg qd.   - c/w patient's home BP regimen  - Will hold patient's home diuretics (bumetanide, metolazone) given ESRD status

## 2023-09-27 NOTE — PROGRESS NOTE ADULT - PROBLEM SELECTOR PLAN 4
Pt with a history of anemia in the setting of ESRD. Baseline hemoglobin ~7-8.   - Hb on admission 6.7, received 1u pRBCs  - Iron low, ferritin low  - c/w oral ferrous sulfate Pt w/ hypocalcemia, low PTH, and low vitamin D, likely multifactorial i/s/o ESRD w/ elevated phos, severe vitamin D deficiency, and possible hypoparathyroidism (versus low PTH i/s/o electrolyte abnormalities)  - Endocrinology consulted, appreciate recs  - Repeat PTH elevated, more consistent with secondary hyperparathyroidism in the setting of ESRD  - c/w ergocalciferol 77151 units qweekly

## 2023-09-27 NOTE — PROVIDER CONTACT NOTE (OTHER) - RECOMMENDATIONS
Order for metoprolol taken from primary team and was given to patient. Patient can go back to her room at 4 DSU [id number].

## 2023-09-27 NOTE — PROGRESS NOTE ADULT - PROBLEM SELECTOR PLAN 5
Vitamin D, 25-Hydroxy: 7.5 ng/mL (09-07-23 @ 06:50)  C/w ergocalciferol 70594 units qweekly x 6weeks   Follow up levels as out pt

## 2023-09-27 NOTE — PROGRESS NOTE ADULT - ATTENDING COMMENTS
Progressed CKD Stage V  was at [facility] early this month for dialysis initiation and she left ama  didn't feel well these past three days- fever, body aches, weakness so presented to [facility]  #viral syndrome now +rhinovirus; supportive management as needed  # ckd stage V-->ESRD  dolores placed 9/21' permacath 9/26 by IR  hd # 1 9/21,   tolerating hd mwf  hd today planned   #access- has permacath   pt refusing avf at this time  #needs outpt hd setup by SW  # anemia in ckd  +iron deficiency- s/p venofer 200mg iv x 5 days  started joseph with hd tiw  monitor hb trend  #hypocalcemia  pth low relatively for CKD- primary hypopth? component   #hyperphosphatemia-  phoslo 667mg po tid with meals; monitor phos trends  #met acidosis  monitor bicarb  #hyperkalemia  k stable today  low k diet  monitor k trend  d/w patient in detail

## 2023-09-27 NOTE — PROGRESS NOTE ADULT - PROBLEM SELECTOR PLAN 7
Patient with a history of hypertension, on home amlodipine 10mg qd, hydralazine 100mg TID, Toprol XL 25mg qd.   - c/w patient's home BP regimen  - Will hold patient's home diuretics (bumetanide, metolazone) given ESRD status Pt with a history of type 2 diabetes mellitus requiring insulin at home. Home regimen: Basaglar 35 units qhs with missed doses, Novolog 10 units premeal with missed doses, Trulicity 1.5mg SQ weekly with missed doses,   - A1c 10.4% on 9/6/23  - Endocrinology consulted on prior admission, rec Lantus 28 units qhs and Admelog 7/7/7 TID premeal  - c/w Lantus 14 units qhs   - Increase Admelog to 6 units TIDAC  - c/w Admelog correctional scale TIDAC and qhs  - FS premeal TID and qhs  - c/w gabapentin to 200mg TID for neuropathic pain

## 2023-09-27 NOTE — PROGRESS NOTE ADULT - PROBLEM SELECTOR PLAN 5
Pt endorsing new onset 10/10 back pain starting yesterday, with focal spinal tenderness on palpation of lumbar spine (L2-L4).  - Xray lumbar spine w/ no fractures/dislocations  - Pain control as needed Pt with a history of anemia in the setting of ESRD. Baseline hemoglobin ~7-8.   - Hb on admission 6.7, received 1u pRBCs  - Iron low, ferritin low  - c/w oral ferrous sulfate

## 2023-09-27 NOTE — PROVIDER CONTACT NOTE (OTHER) - BACKGROUND
Patient admitted for initiation of HD, she came to the ER with complains chills and low back pain, elevated BUN and creatinine.
Patient is here for Sepsis

## 2023-09-28 ENCOUNTER — TRANSCRIPTION ENCOUNTER (OUTPATIENT)
Age: 53
End: 2023-09-28

## 2023-09-28 VITALS
HEART RATE: 95 BPM | OXYGEN SATURATION: 100 % | RESPIRATION RATE: 18 BRPM | TEMPERATURE: 98 F | DIASTOLIC BLOOD PRESSURE: 68 MMHG | SYSTOLIC BLOOD PRESSURE: 111 MMHG

## 2023-09-28 LAB
ANION GAP SERPL CALC-SCNC: 12 MMOL/L — SIGNIFICANT CHANGE UP (ref 5–17)
BASOPHILS # BLD AUTO: 0.02 K/UL — SIGNIFICANT CHANGE UP (ref 0–0.2)
BASOPHILS NFR BLD AUTO: 0.2 % — SIGNIFICANT CHANGE UP (ref 0–2)
BUN SERPL-MCNC: 32 MG/DL — HIGH (ref 7–23)
CALCIUM SERPL-MCNC: 8.2 MG/DL — LOW (ref 8.4–10.5)
CHLORIDE SERPL-SCNC: 101 MMOL/L — SIGNIFICANT CHANGE UP (ref 96–108)
CO2 SERPL-SCNC: 28 MMOL/L — SIGNIFICANT CHANGE UP (ref 22–31)
CREAT SERPL-MCNC: 4.21 MG/DL — HIGH (ref 0.5–1.3)
EGFR: 12 ML/MIN/1.73M2 — LOW
EOSINOPHIL # BLD AUTO: 0.18 K/UL — SIGNIFICANT CHANGE UP (ref 0–0.5)
EOSINOPHIL NFR BLD AUTO: 1.8 % — SIGNIFICANT CHANGE UP (ref 0–6)
GLUCOSE BLDC GLUCOMTR-MCNC: 168 MG/DL — HIGH (ref 70–99)
GLUCOSE BLDC GLUCOMTR-MCNC: 186 MG/DL — HIGH (ref 70–99)
GLUCOSE BLDC GLUCOMTR-MCNC: 248 MG/DL — HIGH (ref 70–99)
GLUCOSE BLDC GLUCOMTR-MCNC: 260 MG/DL — HIGH (ref 70–99)
GLUCOSE BLDC GLUCOMTR-MCNC: 270 MG/DL — HIGH (ref 70–99)
GLUCOSE BLDC GLUCOMTR-MCNC: 64 MG/DL — LOW (ref 70–99)
GLUCOSE BLDC GLUCOMTR-MCNC: 68 MG/DL — LOW (ref 70–99)
GLUCOSE BLDC GLUCOMTR-MCNC: 76 MG/DL — SIGNIFICANT CHANGE UP (ref 70–99)
GLUCOSE SERPL-MCNC: 51 MG/DL — CRITICAL LOW (ref 70–99)
HCT VFR BLD CALC: 23.9 % — LOW (ref 34.5–45)
HGB BLD-MCNC: 8.1 G/DL — LOW (ref 11.5–15.5)
IMM GRANULOCYTES NFR BLD AUTO: 2.3 % — HIGH (ref 0–0.9)
LYMPHOCYTES # BLD AUTO: 3.4 K/UL — HIGH (ref 1–3.3)
LYMPHOCYTES # BLD AUTO: 33.3 % — SIGNIFICANT CHANGE UP (ref 13–44)
MAGNESIUM SERPL-MCNC: 1.9 MG/DL — SIGNIFICANT CHANGE UP (ref 1.6–2.6)
MCHC RBC-ENTMCNC: 29.8 PG — SIGNIFICANT CHANGE UP (ref 27–34)
MCHC RBC-ENTMCNC: 33.9 GM/DL — SIGNIFICANT CHANGE UP (ref 32–36)
MCV RBC AUTO: 87.9 FL — SIGNIFICANT CHANGE UP (ref 80–100)
MONOCYTES # BLD AUTO: 0.74 K/UL — SIGNIFICANT CHANGE UP (ref 0–0.9)
MONOCYTES NFR BLD AUTO: 7.2 % — SIGNIFICANT CHANGE UP (ref 2–14)
NEUTROPHILS # BLD AUTO: 5.64 K/UL — SIGNIFICANT CHANGE UP (ref 1.8–7.4)
NEUTROPHILS NFR BLD AUTO: 55.2 % — SIGNIFICANT CHANGE UP (ref 43–77)
NRBC # BLD: 0 /100 WBCS — SIGNIFICANT CHANGE UP (ref 0–0)
PHOSPHATE SERPL-MCNC: 3.5 MG/DL — SIGNIFICANT CHANGE UP (ref 2.5–4.5)
PLATELET # BLD AUTO: 283 K/UL — SIGNIFICANT CHANGE UP (ref 150–400)
POTASSIUM SERPL-MCNC: 3.5 MMOL/L — SIGNIFICANT CHANGE UP (ref 3.5–5.3)
POTASSIUM SERPL-SCNC: 3.5 MMOL/L — SIGNIFICANT CHANGE UP (ref 3.5–5.3)
RBC # BLD: 2.72 M/UL — LOW (ref 3.8–5.2)
RBC # FLD: 12.6 % — SIGNIFICANT CHANGE UP (ref 10.3–14.5)
SODIUM SERPL-SCNC: 141 MMOL/L — SIGNIFICANT CHANGE UP (ref 135–145)
WBC # BLD: 10.21 K/UL — SIGNIFICANT CHANGE UP (ref 3.8–10.5)
WBC # FLD AUTO: 10.21 K/UL — SIGNIFICANT CHANGE UP (ref 3.8–10.5)

## 2023-09-28 PROCEDURE — 82652 VIT D 1 25-DIHYDROXY: CPT

## 2023-09-28 PROCEDURE — 87186 SC STD MICRODIL/AGAR DIL: CPT

## 2023-09-28 PROCEDURE — 36558 INSERT TUNNELED CV CATH: CPT

## 2023-09-28 PROCEDURE — 96365 THER/PROPH/DIAG IV INF INIT: CPT

## 2023-09-28 PROCEDURE — C1750: CPT

## 2023-09-28 PROCEDURE — 85730 THROMBOPLASTIN TIME PARTIAL: CPT

## 2023-09-28 PROCEDURE — 87040 BLOOD CULTURE FOR BACTERIA: CPT

## 2023-09-28 PROCEDURE — 82607 VITAMIN B-12: CPT

## 2023-09-28 PROCEDURE — 83550 IRON BINDING TEST: CPT

## 2023-09-28 PROCEDURE — C1894: CPT

## 2023-09-28 PROCEDURE — 82330 ASSAY OF CALCIUM: CPT

## 2023-09-28 PROCEDURE — 82728 ASSAY OF FERRITIN: CPT

## 2023-09-28 PROCEDURE — C1769: CPT

## 2023-09-28 PROCEDURE — 99239 HOSP IP/OBS DSCHRG MGMT >30: CPT | Mod: GC

## 2023-09-28 PROCEDURE — 76937 US GUIDE VASCULAR ACCESS: CPT

## 2023-09-28 PROCEDURE — 85027 COMPLETE CBC AUTOMATED: CPT

## 2023-09-28 PROCEDURE — 83970 ASSAY OF PARATHORMONE: CPT

## 2023-09-28 PROCEDURE — 83036 HEMOGLOBIN GLYCOSYLATED A1C: CPT

## 2023-09-28 PROCEDURE — 86850 RBC ANTIBODY SCREEN: CPT

## 2023-09-28 PROCEDURE — 81001 URINALYSIS AUTO W/SCOPE: CPT

## 2023-09-28 PROCEDURE — 72100 X-RAY EXAM L-S SPINE 2/3 VWS: CPT

## 2023-09-28 PROCEDURE — 84100 ASSAY OF PHOSPHORUS: CPT

## 2023-09-28 PROCEDURE — 86480 TB TEST CELL IMMUN MEASURE: CPT

## 2023-09-28 PROCEDURE — 86140 C-REACTIVE PROTEIN: CPT

## 2023-09-28 PROCEDURE — 71045 X-RAY EXAM CHEST 1 VIEW: CPT | Mod: 26

## 2023-09-28 PROCEDURE — 99261: CPT

## 2023-09-28 PROCEDURE — C1887: CPT

## 2023-09-28 PROCEDURE — 86704 HEP B CORE ANTIBODY TOTAL: CPT

## 2023-09-28 PROCEDURE — 85610 PROTHROMBIN TIME: CPT

## 2023-09-28 PROCEDURE — 87640 STAPH A DNA AMP PROBE: CPT

## 2023-09-28 PROCEDURE — 80048 BASIC METABOLIC PNL TOTAL CA: CPT

## 2023-09-28 PROCEDURE — 84295 ASSAY OF SERUM SODIUM: CPT

## 2023-09-28 PROCEDURE — 83540 ASSAY OF IRON: CPT

## 2023-09-28 PROCEDURE — 36430 TRANSFUSION BLD/BLD COMPNT: CPT

## 2023-09-28 PROCEDURE — 99232 SBSQ HOSP IP/OBS MODERATE 35: CPT

## 2023-09-28 PROCEDURE — 87086 URINE CULTURE/COLONY COUNT: CPT

## 2023-09-28 PROCEDURE — 85045 AUTOMATED RETICULOCYTE COUNT: CPT

## 2023-09-28 PROCEDURE — C1752: CPT

## 2023-09-28 PROCEDURE — 83735 ASSAY OF MAGNESIUM: CPT

## 2023-09-28 PROCEDURE — 87340 HEPATITIS B SURFACE AG IA: CPT

## 2023-09-28 PROCEDURE — 82435 ASSAY OF BLOOD CHLORIDE: CPT

## 2023-09-28 PROCEDURE — 82962 GLUCOSE BLOOD TEST: CPT

## 2023-09-28 PROCEDURE — 87641 MR-STAPH DNA AMP PROBE: CPT

## 2023-09-28 PROCEDURE — 0225U NFCT DS DNA&RNA 21 SARSCOV2: CPT

## 2023-09-28 PROCEDURE — 82746 ASSAY OF FOLIC ACID SERUM: CPT

## 2023-09-28 PROCEDURE — 99285 EMERGENCY DEPT VISIT HI MDM: CPT | Mod: 25

## 2023-09-28 PROCEDURE — 86901 BLOOD TYPING SEROLOGIC RH(D): CPT

## 2023-09-28 PROCEDURE — 84132 ASSAY OF SERUM POTASSIUM: CPT

## 2023-09-28 PROCEDURE — 85025 COMPLETE CBC W/AUTO DIFF WBC: CPT

## 2023-09-28 PROCEDURE — 82947 ASSAY GLUCOSE BLOOD QUANT: CPT

## 2023-09-28 PROCEDURE — 85014 HEMATOCRIT: CPT

## 2023-09-28 PROCEDURE — 85018 HEMOGLOBIN: CPT

## 2023-09-28 PROCEDURE — 83615 LACTATE (LD) (LDH) ENZYME: CPT

## 2023-09-28 PROCEDURE — 36415 COLL VENOUS BLD VENIPUNCTURE: CPT

## 2023-09-28 PROCEDURE — 36556 INSERT NON-TUNNEL CV CATH: CPT

## 2023-09-28 PROCEDURE — 85652 RBC SED RATE AUTOMATED: CPT

## 2023-09-28 PROCEDURE — 71045 X-RAY EXAM CHEST 1 VIEW: CPT

## 2023-09-28 PROCEDURE — 80074 ACUTE HEPATITIS PANEL: CPT

## 2023-09-28 PROCEDURE — 86923 COMPATIBILITY TEST ELECTRIC: CPT

## 2023-09-28 PROCEDURE — 83605 ASSAY OF LACTIC ACID: CPT

## 2023-09-28 PROCEDURE — 80053 COMPREHEN METABOLIC PANEL: CPT

## 2023-09-28 PROCEDURE — 77001 FLUOROGUIDE FOR VEIN DEVICE: CPT

## 2023-09-28 PROCEDURE — P9016: CPT

## 2023-09-28 PROCEDURE — 87077 CULTURE AEROBIC IDENTIFY: CPT

## 2023-09-28 PROCEDURE — 96367 TX/PROPH/DG ADDL SEQ IV INF: CPT

## 2023-09-28 PROCEDURE — 86900 BLOOD TYPING SEROLOGIC ABO: CPT

## 2023-09-28 PROCEDURE — 82803 BLOOD GASES ANY COMBINATION: CPT

## 2023-09-28 PROCEDURE — 84145 PROCALCITONIN (PCT): CPT

## 2023-09-28 PROCEDURE — 82310 ASSAY OF CALCIUM: CPT

## 2023-09-28 PROCEDURE — 86706 HEP B SURFACE ANTIBODY: CPT

## 2023-09-28 RX ORDER — METOLAZONE 5 MG/1
1 TABLET ORAL
Refills: 0 | DISCHARGE

## 2023-09-28 RX ORDER — BUMETANIDE 0.25 MG/ML
1 INJECTION INTRAMUSCULAR; INTRAVENOUS
Refills: 0 | DISCHARGE

## 2023-09-28 RX ORDER — DEXTROSE 50 % IN WATER 50 %
15 SYRINGE (ML) INTRAVENOUS ONCE
Refills: 0 | Status: DISCONTINUED | OUTPATIENT
Start: 2023-09-28 | End: 2023-09-28

## 2023-09-28 RX ORDER — CALCIUM ACETATE 667 MG
3 TABLET ORAL
Qty: 90 | Refills: 0
Start: 2023-09-28 | End: 2023-10-27

## 2023-09-28 RX ORDER — INSULIN GLARGINE 100 [IU]/ML
35 INJECTION, SOLUTION SUBCUTANEOUS
Refills: 0 | DISCHARGE

## 2023-09-28 RX ORDER — INSULIN ASPART 100 [IU]/ML
10 INJECTION, SOLUTION SUBCUTANEOUS
Qty: 10 | Refills: 0
Start: 2023-09-28 | End: 2023-10-27

## 2023-09-28 RX ORDER — INSULIN GLARGINE 100 [IU]/ML
16 INJECTION, SOLUTION SUBCUTANEOUS AT BEDTIME
Refills: 0 | Status: DISCONTINUED | OUTPATIENT
Start: 2023-09-28 | End: 2023-09-28

## 2023-09-28 RX ORDER — ERYTHROPOIETIN 10000 [IU]/ML
4000 INJECTION, SOLUTION INTRAVENOUS; SUBCUTANEOUS
Qty: 0 | Refills: 0 | DISCHARGE
Start: 2023-09-28

## 2023-09-28 RX ORDER — ERGOCALCIFEROL 1.25 MG/1
1 CAPSULE ORAL
Qty: 4 | Refills: 0
Start: 2023-09-28 | End: 2023-10-27

## 2023-09-28 RX ORDER — DEXTROSE 50 % IN WATER 50 %
25 SYRINGE (ML) INTRAVENOUS ONCE
Refills: 0 | Status: COMPLETED | OUTPATIENT
Start: 2023-09-28 | End: 2023-09-28

## 2023-09-28 RX ORDER — GABAPENTIN 400 MG/1
2 CAPSULE ORAL
Qty: 180 | Refills: 0
Start: 2023-09-28 | End: 2023-10-27

## 2023-09-28 RX ORDER — TAMSULOSIN HYDROCHLORIDE 0.4 MG/1
1 CAPSULE ORAL
Qty: 30 | Refills: 0
Start: 2023-09-28 | End: 2023-10-27

## 2023-09-28 RX ORDER — CEFUROXIME AXETIL 250 MG
1 TABLET ORAL
Qty: 10 | Refills: 0
Start: 2023-09-28 | End: 2023-10-02

## 2023-09-28 RX ORDER — GABAPENTIN 400 MG/1
1 CAPSULE ORAL
Qty: 180 | Refills: 0 | DISCHARGE
Start: 2023-09-28 | End: 2023-10-27

## 2023-09-28 RX ORDER — HEPARIN SODIUM 5000 [USP'U]/ML
5000 INJECTION INTRAVENOUS; SUBCUTANEOUS EVERY 8 HOURS
Refills: 0 | Status: DISCONTINUED | OUTPATIENT
Start: 2023-09-28 | End: 2023-09-28

## 2023-09-28 RX ORDER — GABAPENTIN 400 MG/1
1 CAPSULE ORAL
Refills: 0 | DISCHARGE

## 2023-09-28 RX ORDER — INSULIN GLARGINE 100 [IU]/ML
16 INJECTION, SOLUTION SUBCUTANEOUS
Qty: 10 | Refills: 0
Start: 2023-09-28 | End: 2023-10-27

## 2023-09-28 RX ORDER — LACTOBACILLUS ACIDOPHILUS 100MM CELL
1 CAPSULE ORAL
Qty: 30 | Refills: 0
Start: 2023-09-28 | End: 2023-10-27

## 2023-09-28 RX ADMIN — Medication 1 TABLET(S): at 17:30

## 2023-09-28 RX ADMIN — Medication 667 MILLIGRAM(S): at 12:43

## 2023-09-28 RX ADMIN — HEPARIN SODIUM 5000 UNIT(S): 5000 INJECTION INTRAVENOUS; SUBCUTANEOUS at 13:52

## 2023-09-28 RX ADMIN — Medication 10 MILLIGRAM(S): at 06:00

## 2023-09-28 RX ADMIN — Medication 1 TABLET(S): at 12:42

## 2023-09-28 RX ADMIN — Medication 10 UNIT(S): at 08:52

## 2023-09-28 RX ADMIN — CHLORHEXIDINE GLUCONATE 1 APPLICATION(S): 213 SOLUTION TOPICAL at 12:42

## 2023-09-28 RX ADMIN — AMLODIPINE BESYLATE 10 MILLIGRAM(S): 2.5 TABLET ORAL at 06:00

## 2023-09-28 RX ADMIN — Medication 1: at 17:36

## 2023-09-28 RX ADMIN — Medication 100 MILLIGRAM(S): at 05:59

## 2023-09-28 RX ADMIN — GABAPENTIN 200 MILLIGRAM(S): 400 CAPSULE ORAL at 13:50

## 2023-09-28 RX ADMIN — Medication 100 MILLIGRAM(S): at 13:50

## 2023-09-28 RX ADMIN — Medication 3: at 12:42

## 2023-09-28 RX ADMIN — Medication 667 MILLIGRAM(S): at 17:30

## 2023-09-28 RX ADMIN — Medication 10 UNIT(S): at 17:31

## 2023-09-28 RX ADMIN — Medication 25 GRAM(S): at 08:47

## 2023-09-28 RX ADMIN — FAMOTIDINE 20 MILLIGRAM(S): 10 INJECTION INTRAVENOUS at 12:43

## 2023-09-28 RX ADMIN — Medication 25 MILLIGRAM(S): at 06:07

## 2023-09-28 RX ADMIN — CEFTRIAXONE 100 MILLIGRAM(S): 500 INJECTION, POWDER, FOR SOLUTION INTRAMUSCULAR; INTRAVENOUS at 08:52

## 2023-09-28 RX ADMIN — GABAPENTIN 200 MILLIGRAM(S): 400 CAPSULE ORAL at 05:59

## 2023-09-28 RX ADMIN — Medication 10 UNIT(S): at 12:43

## 2023-09-28 NOTE — PROGRESS NOTE ADULT - PROBLEM SELECTOR PLAN 1
- Test BG ac, hs and 2am. Q6h when NPO  - Decrease Lantus to 16 units q hs  - C/w admelog 10 units TIDAC . HOLD IF NPO/NOT EATING  -c/w  Admelog 2 units with sanck at night/OLD IF NOT HAIVNG SNACK  - Continue with Low dose admelog correction scale TIDQAC and separate low dose scale QHS and 2am  - Will continue to adjust insulin as needed   Discharge:   Basal/bolus insulin (doses as above) patient to call PCP/Endocrinologist if BG remains >200 or <70  Make sure pt has Rx for all DM supplies and insulin  Can follow at [facility] practice. [address]. Phone . Call for apt closer to discharge  Needs Optho/renal f/u

## 2023-09-28 NOTE — PROGRESS NOTE ADULT - PROBLEM SELECTOR PLAN 4
Pt w/ hypocalcemia, low PTH, and low vitamin D, likely multifactorial i/s/o ESRD w/ elevated phos, severe vitamin D deficiency, and possible hypoparathyroidism (versus low PTH i/s/o electrolyte abnormalities)  - Endocrinology consulted, appreciate recs  - Repeat PTH elevated, more consistent with secondary hyperparathyroidism in the setting of ESRD  - c/w ergocalciferol 70928 units qweekly

## 2023-09-28 NOTE — PROGRESS NOTE ADULT - ATTENDING COMMENTS
-UCx growing E.coli. Symptoms improved on CTX thus far. Will transition to oral cephalosporin for total 7 day course of abx for UTI.   -Renal recs appreciated.   -CM for HD set up appreciated. Patient can be DC'ed today and start her HD tomorrow.   -Endo f/u for DM recs appreciated.   -Stable for DC with outpatient PMD, renal, and endo f/u.   -36 minutes spent on the DC process.

## 2023-09-28 NOTE — PROGRESS NOTE ADULT - SUBJECTIVE AND OBJECTIVE BOX
seen earlier today     Chief Complaint: Diabetes Mellitus follow up    INTERVAL HX: hypoglycemic to 64, serum BG 51 this am-> patient reports eating pudding and receiving IV dextrose w. improvement of BG , ate breakfast . patient c/o bilateral LE neuropathy pain      Review of Systems:  General: As above  GI: No nausea, vomiting  Endocrine: weakness when hypoglycemia, resolved s/p IV Dextrose     Allergies    No Known Allergies    Intolerances      MEDICATIONS  (STANDING):  amLODIPine   Tablet 10 milliGRAM(s) Oral daily  atorvastatin 80 milliGRAM(s) Oral at bedtime  calcium acetate 667 milliGRAM(s) Oral three times a day with meals  cefTRIAXone   IVPB      cefTRIAXone   IVPB 1000 milliGRAM(s) IV Intermittent every 24 hours  chlorhexidine 4% Liquid 1 Application(s) Topical <User Schedule>  dextrose 5%. 1000 milliLiter(s) (100 mL/Hr) IV Continuous <Continuous>  dextrose 5%. 1000 milliLiter(s) (50 mL/Hr) IV Continuous <Continuous>  dextrose 50% Injectable 12.5 Gram(s) IV Push once  dextrose 50% Injectable 25 Gram(s) IV Push once  dextrose 50% Injectable 25 Gram(s) IV Push once  epoetin suzette-epbx (RETACRIT) Injectable 4000 Unit(s) IV Push <User Schedule>  ergocalciferol 65207 Unit(s) Oral every week  famotidine    Tablet 20 milliGRAM(s) Oral daily  gabapentin 200 milliGRAM(s) Oral three times a day  glucagon  Injectable 1 milliGRAM(s) IntraMuscular once  heparin   Injectable 5000 Unit(s) SubCutaneous every 8 hours  hydrALAZINE 100 milliGRAM(s) Oral three times a day  influenza   Vaccine 0.5 milliLiter(s) IntraMuscular once  insulin glargine Injectable (LANTUS) 16 Unit(s) SubCutaneous at bedtime  insulin lispro (ADMELOG) corrective regimen sliding scale   SubCutaneous three times a day before meals  insulin lispro (ADMELOG) corrective regimen sliding scale   SubCutaneous <User Schedule>  insulin lispro Injectable (ADMELOG) 2 Unit(s) SubCutaneous at bedtime  insulin lispro Injectable (ADMELOG) 10 Unit(s) SubCutaneous three times a day before meals  lactobacillus acidophilus 1 Tablet(s) Oral three times a day with meals  melatonin 3 milliGRAM(s) Oral at bedtime  metoprolol succinate ER 25 milliGRAM(s) Oral daily  oxyCODONE    IR 2.5 milliGRAM(s) Oral once  predniSONE   Tablet 10 milliGRAM(s) Oral daily  senna 2 Tablet(s) Oral at bedtime  tamsulosin 0.4 milliGRAM(s) Oral at bedtime      atorvastatin   80 milliGRAM(s) Oral (09-27-23 @ 21:02)    dextrose 50% Injectable   25 Gram(s) IV Push (09-28-23 @ 08:47)    insulin glargine Injectable (LANTUS)   20 Unit(s) SubCutaneous (09-27-23 @ 21:58)    insulin lispro (ADMELOG) corrective regimen sliding scale   3 Unit(s) SubCutaneous (09-28-23 @ 12:42)    insulin lispro Injectable (ADMELOG)   2 Unit(s) SubCutaneous (09-27-23 @ 21:59)    insulin lispro Injectable (ADMELOG)   10 Unit(s) SubCutaneous (09-28-23 @ 12:43)   10 Unit(s) SubCutaneous (09-28-23 @ 08:52)    predniSONE   Tablet   10 milliGRAM(s) Oral (09-28-23 @ 06:00)        PHYSICAL EXAM:  VITALS: T(C): 36.8 (09-28-23 @ 12:58)  T(F): 98.2 (09-28-23 @ 12:58), Max: 98.8 (09-28-23 @ 06:49)  HR: 95 (09-28-23 @ 12:58) (95 - 114)  BP: 111/68 (09-28-23 @ 12:58) (111/68 - 170/99)  RR:  (16 - 18)  SpO2:  (98% - 100%)  Wt(kg): --  GENERAL: NAD  Respiratory: Respirations unlabored   Extremities: Warm, no edema  NEURO: Alert , appropriate     LABS:  POCT Blood Glucose.: 260 mg/dL (09-28-23 @ 12:06)  POCT Blood Glucose.: 248 mg/dL (09-28-23 @ 09:22)  POCT Blood Glucose.: 270 mg/dL (09-28-23 @ 08:57)  POCT Blood Glucose.: 76 mg/dL (09-28-23 @ 08:39)  POCT Blood Glucose.: 68 mg/dL (09-28-23 @ 08:20)  POCT Blood Glucose.: 64 mg/dL (09-28-23 @ 08:19)  POCT Blood Glucose.: 168 mg/dL (09-28-23 @ 02:01)  POCT Blood Glucose.: 206 mg/dL (09-27-23 @ 21:33)  POCT Blood Glucose.: 165 mg/dL (09-27-23 @ 12:37)  POCT Blood Glucose.: 123 mg/dL (09-27-23 @ 08:26)  POCT Blood Glucose.: 138 mg/dL (09-27-23 @ 04:11)  POCT Blood Glucose.: 302 mg/dL (09-26-23 @ 21:22)  POCT Blood Glucose.: 237 mg/dL (09-26-23 @ 17:20)  POCT Blood Glucose.: 173 mg/dL (09-26-23 @ 14:18)  POCT Blood Glucose.: 184 mg/dL (09-26-23 @ 13:07)  POCT Blood Glucose.: 222 mg/dL (09-26-23 @ 11:55)  POCT Blood Glucose.: 149 mg/dL (09-26-23 @ 09:02)  POCT Blood Glucose.: 156 mg/dL (09-26-23 @ 03:10)  POCT Blood Glucose.: 134 mg/dL (09-25-23 @ 21:36)  POCT Blood Glucose.: 323 mg/dL (09-25-23 @ 17:33)                          8.1    10.21 )-----------( 283      ( 28 Sep 2023 07:13 )             23.9     09-28    141  |  101  |  32<H>  ----------------------------<  51<LL>  3.5   |  28  |  4.21<H>    Ca    8.2<L>      28 Sep 2023 07:14  Phos  3.5     09-28  Mg     1.9     09-28      eGFR: 12 mL/min/1.73m2 (28 Sep 2023 07:14)      Thyroid Function Tests:      A1C with Estimated Average Glucose Result: 9.2 % (09-21-23 @ 05:16)  A1C with Estimated Average Glucose Result: 10.4 % (09-06-23 @ 17:15)    Estimated Average Glucose: 217 mg/dL (09-21-23 @ 05:16)  Estimated Average Glucose: 252 (09-06-23 @ 17:15)        Diet, Renal Restrictions:   For patients receiving Renal Replacement - No Protein Restr, No Conc K, No Conc Phos, Low Sodium  Consistent Carbohydrate Evening Snack (CSTCHOSN)  Supplement Feeding Modality:  Oral  Nepro Cans or Servings Per Day:  2       Frequency:  Daily (09-25-23 @ 14:18) [Active]

## 2023-09-28 NOTE — PROGRESS NOTE ADULT - SUBJECTIVE AND OBJECTIVE BOX
PROGRESS NOTE:   Patient is a 53y old  Female who presents with a chief complaint of Chills (27 Sep 2023 17:38)    SUBJECTIVE / OVERNIGHT EVENTS:        MEDICATIONS  (STANDING):  amLODIPine   Tablet 10 milliGRAM(s) Oral daily  atorvastatin 80 milliGRAM(s) Oral at bedtime  calcium acetate 667 milliGRAM(s) Oral three times a day with meals  cefTRIAXone   IVPB 1000 milliGRAM(s) IV Intermittent every 24 hours  cefTRIAXone   IVPB      chlorhexidine 4% Liquid 1 Application(s) Topical <User Schedule>  dextrose 5%. 1000 milliLiter(s) (50 mL/Hr) IV Continuous <Continuous>  dextrose 5%. 1000 milliLiter(s) (100 mL/Hr) IV Continuous <Continuous>  dextrose 50% Injectable 25 Gram(s) IV Push once  dextrose 50% Injectable 12.5 Gram(s) IV Push once  dextrose 50% Injectable 25 Gram(s) IV Push once  epoetin suzette-epbx (RETACRIT) Injectable 4000 Unit(s) IV Push <User Schedule>  ergocalciferol 15817 Unit(s) Oral every week  famotidine    Tablet 20 milliGRAM(s) Oral daily  gabapentin 200 milliGRAM(s) Oral three times a day  glucagon  Injectable 1 milliGRAM(s) IntraMuscular once  hydrALAZINE 100 milliGRAM(s) Oral three times a day  influenza   Vaccine 0.5 milliLiter(s) IntraMuscular once  insulin glargine Injectable (LANTUS) 20 Unit(s) SubCutaneous at bedtime  insulin lispro (ADMELOG) corrective regimen sliding scale   SubCutaneous three times a day before meals  insulin lispro (ADMELOG) corrective regimen sliding scale   SubCutaneous <User Schedule>  insulin lispro Injectable (ADMELOG) 2 Unit(s) SubCutaneous at bedtime  insulin lispro Injectable (ADMELOG) 10 Unit(s) SubCutaneous three times a day before meals  lactobacillus acidophilus 1 Tablet(s) Oral three times a day with meals  melatonin 3 milliGRAM(s) Oral at bedtime  metoprolol succinate ER 25 milliGRAM(s) Oral daily  oxyCODONE    IR 2.5 milliGRAM(s) Oral once  predniSONE   Tablet 10 milliGRAM(s) Oral daily  senna 2 Tablet(s) Oral at bedtime  tamsulosin 0.4 milliGRAM(s) Oral at bedtime    MEDICATIONS  (PRN):  acetaminophen     Tablet .. 650 milliGRAM(s) Oral every 6 hours PRN Mild Pain (1 - 3)  dextrose Oral Gel 15 Gram(s) Oral once PRN Blood Glucose LESS THAN 70 milliGRAM(s)/deciliter  ondansetron Injectable 4 milliGRAM(s) IV Push once PRN Nausea and/or Vomiting  ondansetron Injectable 4 milliGRAM(s) IV Push once PRN Nausea and/or Vomiting  sodium chloride 0.9% lock flush 10 milliLiter(s) IV Push every 1 hour PRN Pre/post blood products, medications, blood draw, and to maintain line patency      CAPILLARY BLOOD GLUCOSE  POCT Blood Glucose.: 168 mg/dL (28 Sep 2023 02:01)  POCT Blood Glucose.: 206 mg/dL (27 Sep 2023 21:33)  POCT Blood Glucose.: 165 mg/dL (27 Sep 2023 12:37)  POCT Blood Glucose.: 123 mg/dL (27 Sep 2023 08:26)    I&O's Summary  27 Sep 2023 07:01  -  28 Sep 2023 07:00  --------------------------------------------------------  IN: 480 mL / OUT: 400 mL / NET: 80 mL      PHYSICAL EXAM:  Vital Signs Last 24 Hrs  T(C): 37.1 (28 Sep 2023 06:49), Max: 37.1 (28 Sep 2023 06:49)  T(F): 98.8 (28 Sep 2023 06:49), Max: 98.8 (28 Sep 2023 06:49)  HR: 110 (28 Sep 2023 06:49) (101 - 114)  BP: 136/76 (28 Sep 2023 06:49) (134/75 - 170/99)  BP(mean): --  RR: 18 (28 Sep 2023 06:49) (16 - 18)  SpO2: 98% (28 Sep 2023 06:49) (98% - 100%)  Parameters below as of 28 Sep 2023 06:49  Patient On (Oxygen Delivery Method): room air    CONSTITUTIONAL: NAD, well-developed  HEENT: PERRLA, EOMI, moist mucous membranes.  NECK: Supple. No JVD. +R Permacath site appears c/d/i.  RESPIRATORY: Normal respiratory effort, Lungs CTAB  CARDIOVASCULAR: Regular rate and rhythm with normal S1 and S2. No murmurs.  ABDOMEN: Soft, non-tender, non-distended. Normoactive bowel sounds, no rebound/guarding; No hepatosplenomegaly  EXTREMITIES: Mild non-pitting BLE edema. 2+ peripheral pulses. No clubbing, cyanosis.  MUSCULOSKELETAL: No joint swelling or tenderness to palpation  SKIN: No rashes or lesions  NEUROLOGIC: A&Ox3. No focal deficits.  PSYCH: Affect appropriate      LABS:                        7.6    9.56  )-----------( 255      ( 27 Sep 2023 07:04 )             23.4     09-27  141  |  101  |  53<H>  ----------------------------<  94  4.1   |  25  |  6.19<H>    Ca    8.3<L>      27 Sep 2023 07:01  Phos  3.2     09-27  Mg     2.1     09-27    Urinalysis Basic - ( 27 Sep 2023 07:01 )  Color: x / Appearance: x / SG: x / pH: x  Gluc: 94 mg/dL / Ketone: x  / Bili: x / Urobili: x   Blood: x / Protein: x / Nitrite: x   Leuk Esterase: x / RBC: x / WBC x   Sq Epi: x / Non Sq Epi: x / Bacteria: x        RADIOLOGY & ADDITIONAL TESTS:  Results Reviewed:   Imaging Personally Reviewed:  Electrocardiogram Personally Reviewed:    COORDINATION OF CARE:  Care Discussed with Consultants/Other Providers [Y/N]:  Prior or Outpatient Records Reviewed [Y/N]: PROGRESS NOTE:   Patient is a 53y old  Female who presents with a chief complaint of Chills (27 Sep 2023 17:38)    SUBJECTIVE / OVERNIGHT EVENTS:  Patient noted to be tachycardic and had b/l leg pain at the end of her dialysis yesterday, had to stop HD about 30 minutes early. This morning, patient still endorsing some neuropathic leg pain, but currently manageable. She is trying to figure out a better time for her outpatient dialysis sessions at the facility she was accepted to. Patient denies any fever, chills, SOB, chest pain, abdominal pain, nausea/vomiting, diarrhea, or urinary symptoms.      MEDICATIONS  (STANDING):  amLODIPine   Tablet 10 milliGRAM(s) Oral daily  atorvastatin 80 milliGRAM(s) Oral at bedtime  calcium acetate 667 milliGRAM(s) Oral three times a day with meals  cefTRIAXone   IVPB 1000 milliGRAM(s) IV Intermittent every 24 hours  cefTRIAXone   IVPB      chlorhexidine 4% Liquid 1 Application(s) Topical <User Schedule>  dextrose 5%. 1000 milliLiter(s) (50 mL/Hr) IV Continuous <Continuous>  dextrose 5%. 1000 milliLiter(s) (100 mL/Hr) IV Continuous <Continuous>  dextrose 50% Injectable 25 Gram(s) IV Push once  dextrose 50% Injectable 12.5 Gram(s) IV Push once  dextrose 50% Injectable 25 Gram(s) IV Push once  epoetin suzette-epbx (RETACRIT) Injectable 4000 Unit(s) IV Push <User Schedule>  ergocalciferol 24758 Unit(s) Oral every week  famotidine    Tablet 20 milliGRAM(s) Oral daily  gabapentin 200 milliGRAM(s) Oral three times a day  glucagon  Injectable 1 milliGRAM(s) IntraMuscular once  hydrALAZINE 100 milliGRAM(s) Oral three times a day  influenza   Vaccine 0.5 milliLiter(s) IntraMuscular once  insulin glargine Injectable (LANTUS) 20 Unit(s) SubCutaneous at bedtime  insulin lispro (ADMELOG) corrective regimen sliding scale   SubCutaneous three times a day before meals  insulin lispro (ADMELOG) corrective regimen sliding scale   SubCutaneous <User Schedule>  insulin lispro Injectable (ADMELOG) 2 Unit(s) SubCutaneous at bedtime  insulin lispro Injectable (ADMELOG) 10 Unit(s) SubCutaneous three times a day before meals  lactobacillus acidophilus 1 Tablet(s) Oral three times a day with meals  melatonin 3 milliGRAM(s) Oral at bedtime  metoprolol succinate ER 25 milliGRAM(s) Oral daily  oxyCODONE    IR 2.5 milliGRAM(s) Oral once  predniSONE   Tablet 10 milliGRAM(s) Oral daily  senna 2 Tablet(s) Oral at bedtime  tamsulosin 0.4 milliGRAM(s) Oral at bedtime    MEDICATIONS  (PRN):  acetaminophen     Tablet .. 650 milliGRAM(s) Oral every 6 hours PRN Mild Pain (1 - 3)  dextrose Oral Gel 15 Gram(s) Oral once PRN Blood Glucose LESS THAN 70 milliGRAM(s)/deciliter  ondansetron Injectable 4 milliGRAM(s) IV Push once PRN Nausea and/or Vomiting  ondansetron Injectable 4 milliGRAM(s) IV Push once PRN Nausea and/or Vomiting  sodium chloride 0.9% lock flush 10 milliLiter(s) IV Push every 1 hour PRN Pre/post blood products, medications, blood draw, and to maintain line patency      CAPILLARY BLOOD GLUCOSE  POCT Blood Glucose.: 168 mg/dL (28 Sep 2023 02:01)  POCT Blood Glucose.: 206 mg/dL (27 Sep 2023 21:33)  POCT Blood Glucose.: 165 mg/dL (27 Sep 2023 12:37)  POCT Blood Glucose.: 123 mg/dL (27 Sep 2023 08:26)    I&O's Summary  27 Sep 2023 07:01  -  28 Sep 2023 07:00  --------------------------------------------------------  IN: 480 mL / OUT: 400 mL / NET: 80 mL      PHYSICAL EXAM:  Vital Signs Last 24 Hrs  T(C): 37.1 (28 Sep 2023 06:49), Max: 37.1 (28 Sep 2023 06:49)  T(F): 98.8 (28 Sep 2023 06:49), Max: 98.8 (28 Sep 2023 06:49)  HR: 110 (28 Sep 2023 06:49) (101 - 114)  BP: 136/76 (28 Sep 2023 06:49) (134/75 - 170/99)  BP(mean): --  RR: 18 (28 Sep 2023 06:49) (16 - 18)  SpO2: 98% (28 Sep 2023 06:49) (98% - 100%)  Parameters below as of 28 Sep 2023 06:49  Patient On (Oxygen Delivery Method): room air    CONSTITUTIONAL: NAD, well-developed  HEENT: PERRLA, EOMI, moist mucous membranes.  NECK: Supple. No JVD. +R Permacath site appears c/d/i.  RESPIRATORY: Normal respiratory effort, Lungs CTAB  CARDIOVASCULAR: Regular rate and rhythm with normal S1 and S2. No murmurs.  ABDOMEN: Soft, non-tender, non-distended. Normoactive bowel sounds, no rebound/guarding; No hepatosplenomegaly  EXTREMITIES: Mild non-pitting BLE edema. 2+ peripheral pulses. No clubbing, cyanosis.  MUSCULOSKELETAL: No joint swelling or tenderness to palpation  SKIN: No rashes or lesions  NEUROLOGIC: A&Ox3. No focal deficits.  PSYCH: Affect appropriate      LABS:                        7.6    9.56  )-----------( 255      ( 27 Sep 2023 07:04 )             23.4     09-27  141  |  101  |  53<H>  ----------------------------<  94  4.1   |  25  |  6.19<H>    Ca    8.3<L>      27 Sep 2023 07:01  Phos  3.2     09-27  Mg     2.1     09-27    Urinalysis Basic - ( 27 Sep 2023 07:01 )  Color: x / Appearance: x / SG: x / pH: x  Gluc: 94 mg/dL / Ketone: x  / Bili: x / Urobili: x   Blood: x / Protein: x / Nitrite: x   Leuk Esterase: x / RBC: x / WBC x   Sq Epi: x / Non Sq Epi: x / Bacteria: x        RADIOLOGY & ADDITIONAL TESTS:  Results Reviewed:   Imaging Personally Reviewed:  Electrocardiogram Personally Reviewed:    COORDINATION OF CARE:  Care Discussed with Consultants/Other Providers [Y/N]:  Prior or Outpatient Records Reviewed [Y/N]:

## 2023-09-28 NOTE — PROGRESS NOTE ADULT - REASON FOR ADMISSION
Chills

## 2023-09-28 NOTE — PROVIDER CONTACT NOTE (HYPOGLYCEMIA EVENT) - NS PROVIDER CONTACT BACKGROUND-HYPO
Age: 53y    Gender: Female    POCT Blood Glucose:  260 mg/dL (09-28-23 @ 12:06)  248 mg/dL (09-28-23 @ 09:22)  270 mg/dL (09-28-23 @ 08:57)  76 mg/dL (09-28-23 @ 08:39)  68 mg/dL (09-28-23 @ 08:20)  64 mg/dL (09-28-23 @ 08:19)  168 mg/dL (09-28-23 @ 02:01)  206 mg/dL (09-27-23 @ 21:33)      eMAR:atorvastatin   80 milliGRAM(s) Oral (09-27-23 @ 21:02)    dextrose 50% Injectable   25 Gram(s) IV Push (09-28-23 @ 08:47)    insulin glargine Injectable (LANTUS)   20 Unit(s) SubCutaneous (09-27-23 @ 21:58)    insulin lispro (ADMELOG) corrective regimen sliding scale   1 Unit(s) SubCutaneous (09-27-23 @ 12:48)    insulin lispro Injectable (ADMELOG)   10 Unit(s) SubCutaneous (09-28-23 @ 08:52)   10 Unit(s) SubCutaneous (09-27-23 @ 12:48)    insulin lispro Injectable (ADMELOG)   2 Unit(s) SubCutaneous (09-27-23 @ 21:59)    predniSONE   Tablet   10 milliGRAM(s) Oral (09-28-23 @ 06:00)

## 2023-09-28 NOTE — DISCHARGE NOTE NURSING/CASE MANAGEMENT/SOCIAL WORK - PATIENT PORTAL LINK FT
You can access the FollowMyHealth Patient Portal offered by [facility] by registering at the following website: [email or web address]. By joining [facility]’s FollowMyHealth portal, you will also be able to view your health information using other applications (apps) compatible with our system.

## 2023-09-28 NOTE — PROGRESS NOTE ADULT - PROBLEM SELECTOR PLAN 1
Pt w/ a history of ESRD, follows with Dr. Jose M Garibay outpt. Prior hospitalization at [facility] 9/6-9/7 for HD initiation, but left AMA. , Cr 10.92 on admission now.  - Pt states that she is unwilling to have the Shiley placed without being put to sleep for it  - Nephrology consulted, recommend initiation of HD on this admission w/ Shiley placement by IR  - c/w phoslo 667mg PO TID premeals for hyperphosphatemia  - c/w sodium bicabonate 650mg PO TID  - R IJ Shiley placed 9/21 with first session of HD, 2nd HD session 9/22, 3rd HD session 9/25  - Flomax started 9/23 to help with voiding (retention on admission, difficult rai), TOV 9/25  - Permacath placed on 9/26, plan for HD 9/27 Pt w/ a history of ESRD, follows with Dr. Jose M Garibay outpt. Prior hospitalization at [facility] 9/6-9/7 for HD initiation, but left AMA. , Cr 10.92 on admission now.  - Pt states that she is unwilling to have the Shiley placed without being put to sleep for it  - Nephrology consulted, recommend initiation of HD on this admission w/ Shiley placement by IR  - c/w phoslo 667mg PO TID premeals for hyperphosphatemia  - c/w sodium bicabonate 650mg PO TID  - R IJ Shiley placed 9/21 with first session of HD, 2nd HD session 9/22, 3rd HD session 9/25  - Flomax started 9/23 to help with voiding (retention on admission, difficult rai), TOV 9/25  - Permacath placed on 9/26, plan for HD 9/27  - Pending acceptance at outpatient dialysis

## 2023-09-28 NOTE — DISCHARGE NOTE NURSING/CASE MANAGEMENT/SOCIAL WORK - NSDCPEFALRISK_GEN_ALL_CORE
For information on Fall & Injury Prevention, visit: [email or web address] OR  [email or web address] OR  https://www.cdc.gov/steadi/patient.html

## 2023-09-28 NOTE — PROGRESS NOTE ADULT - PROVIDER SPECIALTY LIST ADULT
Endocrinology
Internal Medicine
Nephrology
Nephrology-Cardiorenal
Endocrinology
Endocrinology
Nephrology
Endocrinology
Nephrology
Endocrinology
Internal Medicine

## 2023-09-28 NOTE — DISCHARGE NOTE NURSING/CASE MANAGEMENT/SOCIAL WORK - NSDCCRTYPESERV_GEN_ALL_CORE_FT
Dialysis M/W/F 7:30 pm. Please arrive 30 minutes early on Friday 9/29 to complete required paperwork Dialysis M/W/F 7:30 pm. Please arrive at 3:45 pm on Friday 9/29 to complete required paperwork

## 2023-09-28 NOTE — PROVIDER CONTACT NOTE (CRITICAL VALUE NOTIFICATION) - BACKGROUND
pt has hx of diabetes. admitted for fevers and chills w/ FS of 660 2/2 to skipping lantus and insulin for 2days.

## 2023-09-28 NOTE — PROGRESS NOTE ADULT - PROBLEM SELECTOR PLAN 5
Vitamin D, 25-Hydroxy: 7.5 ng/mL (09-07-23 @ 06:50)  C/w ergocalciferol 07440 units qweekly x 6weeks   Follow up levels as out patient      discussed with patient and Rn  Contact via [employer] Teams during business hours  To reach covering provider access AMION via sunrise tools  For Urgent matters/after-hours/weekends/holidays please page endocrine fellow on call   For nonurgent matters please email [email or web address]    Please note that this patient may be followed by different provider tomorrow.  Notify endocrine 24 hours prior to discharge for final recommendations Vitamin D, 25-Hydroxy: 7.5 ng/mL (09-07-23 @ 06:50)  C/w ergocalciferol 52892 units qweekly x 6weeks   Follow up levels as out patient      consider adjusting gabapentin dose for neuropathy if renal funciton allows   discussed with patient and Rn  Contact via Microsoft Teams during business hours  To reach covering provider access AMION via sunrise tools  For Urgent matters/after-hours/weekends/holidays please page endocrine fellow on call   For nonurgent matters please email [email or web address]    Please note that this patient may be followed by different provider tomorrow.  Notify endocrine 24 hours prior to discharge for final recommendations

## 2023-09-28 NOTE — DISCHARGE NOTE NURSING/CASE MANAGEMENT/SOCIAL WORK - NSDCCRNAME_GEN_ALL_CORE_FT
[facility] [phone] [facility] [phone]

## 2023-09-28 NOTE — PROGRESS NOTE ADULT - ASSESSMENT
Patient is a 54 y/o F w/ a PMHx of ESRD not on HD, poorly controlled Type 2 DM on insulin, HTN, and sarcoidosis, with a recent admission at [facility] (9/6-9/7) for HD initiation but left AMA before HD was started, who is presenting with chills and lower back pain, found to be enterorhinovirus positive on RVP with leukocytosis, as well as anemia and elevated BUN/creatinine, admitted for HD initiation and suspected viral pneumonia.

## 2023-09-28 NOTE — PROVIDER CONTACT NOTE (CRITICAL VALUE NOTIFICATION) - ACTION/TREATMENT ORDERED:
encourage pt to consume carbs and promote oral hydration. follow hypoglycemic protocol and administer d5 25g iv

## 2023-09-28 NOTE — PROGRESS NOTE ADULT - PROBLEM SELECTOR PLAN 12
- DVT PPx: heparin subq  - Diet: Renal, consistent carb  - GOC: full code  - Pharmacy: Rx pharmacy on [address]  - Dispo: outpatient dialysis, pending acceptance (prefers [facility]) - DVT PPx: heparin subq  - Diet: Renal, consistent carb  - GOC: full code  - Pharmacy: Rx pharmacy on [address]  - Dispo: outpatient dialysis, pending acceptance

## 2023-09-28 NOTE — PROGRESS NOTE ADULT - ASSESSMENT
53 F w/h/o uncontrolled T2DM (A1C: 9.2%) on basal/bolus insulin PTA. DM c/b ESRD not on HD, neuropathy. Also h/o HTN, and sarcoidosis on Prednisone 10mg at home. Recent admission at [facility] (9/6-9/7) for HD initiation but left AMA before HD was started. Here with chills and lower back pain, LE edema. Found to have + enterorhinovirus with  leukocytosis, as well as anemia, elevated BUN/creatinine, admitted for HD initiation and suspected viral pneumonia. Endocrinology consulted for hypocalcemia and T2D management. Also found to have Hyperparathyroidism and Vitamin D deficiency. S/p tunneled HD cath 9/26/23. Tolerating POs with BG levels variable depending on PO intake with on/off nutritional indiscretions. Fasting hypoglycemia this am, reduce lantus  BG goal of 100 to 180s.         Spoke to pt about the need for diet adherence> pt states she knows about diet but unable to verbalize if she can follow it. Also discussed effects of HD on glucose control and the likely need of insulin adjustments upon discharge. Pt verbalized understanding.     Home regimen: Basaglar 35 units qhs and Admelog 10-15 units TIDAC

## 2023-09-28 NOTE — DISCHARGE NOTE NURSING/CASE MANAGEMENT/SOCIAL WORK - NSDCFUADDAPPT_GEN_ALL_CORE_FT
Please follow up with your outpatient nephrologist (Dr. Garibya) within 2 weeks of discharge from the hospital. Please also go to your outpatient dialysis sessions as scheduled.    Please follow up with an outpatient endocrinologist within 2 weeks of discharge from the hospital for further management of your Type 2 diabetes and for your low calcium.    Please follow up with your outpatient primary care provider within 2 weeks of discharge from the hospital. You may call [phone] to make an appointment with General Internal Medicine, [address] [place] [address], [address], [place] [address].

## 2023-09-28 NOTE — PROGRESS NOTE ADULT - NUTRITIONAL ASSESSMENT
Diet, Renal Restrictions:   For patients receiving Renal Replacement - No Protein Restr, No Conc K, No Conc Phos, Low Sodium  Consistent Carbohydrate {Evening Snack} (CSTCHOSN)  Supplement Feeding Modality:  Oral  Nepro Cans or Servings Per Day:  2       Frequency:  Daily (09-25-23 @ 14:18) [Active]    Please see RD assessment and/or follow up.  Managed by primary team as well
This patient has been assessed with a concern for Malnutrition and has been determined to have a diagnosis/diagnoses of Moderate protein-calorie malnutrition.    This patient is being managed with:   Diet Renal Restrictions-  For patients receiving Renal Replacement - No Protein Restr No Conc K No Conc Phos Low Sodium  Consistent Carbohydrate {Evening Snack} (CSTCHOSN)  Supplement Feeding Modality:  Oral  Nepro Cans or Servings Per Day:  2       Frequency:  Daily  Entered: Sep 21 2023  1:43PM  
This patient has been assessed with a concern for Malnutrition and has been determined to have a diagnosis/diagnoses of Moderate protein-calorie malnutrition.    This patient is being managed with:   Diet Renal Restrictions-  For patients receiving Renal Replacement - No Protein Restr No Conc K No Conc Phos Low Sodium  Consistent Carbohydrate {Evening Snack} (CSTCHOSN)  Supplement Feeding Modality:  Oral  Nepro Cans or Servings Per Day:  2       Frequency:  Daily  Entered: Sep 21 2023  1:43PM  
This patient has been assessed with a concern for Malnutrition and has been determined to have a diagnosis/diagnoses of Moderate protein-calorie malnutrition.    This patient is being managed with:   Diet Renal Restrictions-  For patients receiving Renal Replacement - No Protein Restr No Conc K No Conc Phos Low Sodium  Consistent Carbohydrate {Evening Snack} (CSTCHOSN)  Supplement Feeding Modality:  Oral  Nepro Cans or Servings Per Day:  2       Frequency:  Daily  Entered: Sep 25 2023  1:33PM  
This patient has been assessed with a concern for Malnutrition and has been determined to have a diagnosis/diagnoses of Moderate protein-calorie malnutrition.    This patient is being managed with:   Diet Renal Restrictions-  For patients receiving Renal Replacement - No Protein Restr No Conc K No Conc Phos Low Sodium  Consistent Carbohydrate {Evening Snack} (CSTCHOSN)  Supplement Feeding Modality:  Oral  Nepro Cans or Servings Per Day:  2       Frequency:  Daily  Entered: Sep 25 2023  1:33PM  
Diet, Renal Restrictions:   For patients receiving Renal Replacement - No Protein Restr, No Conc K, No Conc Phos, Low Sodium  Consistent Carbohydrate {Evening Snack} (CSTCHOSN)  Supplement Feeding Modality:  Oral  Nepro Cans or Servings Per Day:  2       Frequency:  Daily (09-25-23 @ 14:18) [Active]    Please see RD assessment and/or follow up.  Managed by primary team as well
Diet, NPO after Midnight:      NPO Start Date: 25-Sep-2023,   NPO Start Time: 23:59  Except Medications (09-25-23 @ 14:18) [Active]  Diet, Renal Restrictions:   For patients receiving Renal Replacement - No Protein Restr, No Conc K, No Conc Phos, Low Sodium  Consistent Carbohydrate {Evening Snack} (CSTCHOSN)  Supplement Feeding Modality:  Oral  Nepro Cans or Servings Per Day:  2       Frequency:  Daily (09-25-23 @ 14:18) [Active]      Please see RD assessment and/or follow up.  Managed by primary team as well
This patient has been assessed with a concern for Malnutrition and has been determined to have a diagnosis/diagnoses of Moderate protein-calorie malnutrition.    This patient is being managed with:   Diet Renal Restrictions-  For patients receiving Renal Replacement - No Protein Restr No Conc K No Conc Phos Low Sodium  Consistent Carbohydrate {Evening Snack} (CSTCHOSN)  Supplement Feeding Modality:  Oral  Nepro Cans or Servings Per Day:  2       Frequency:  Daily  Entered: Sep 21 2023  1:43PM  
This patient has been assessed with a concern for Malnutrition and has been determined to have a diagnosis/diagnoses of Moderate protein-calorie malnutrition.    This patient is being managed with:   Diet Renal Restrictions-  For patients receiving Renal Replacement - No Protein Restr No Conc K No Conc Phos Low Sodium  Consistent Carbohydrate {Evening Snack} (CSTCHOSN)  Supplement Feeding Modality:  Oral  Nepro Cans or Servings Per Day:  2       Frequency:  Daily  Entered: Sep 25 2023  1:33PM  
This patient has been assessed with a concern for Malnutrition and has been determined to have a diagnosis/diagnoses of Moderate protein-calorie malnutrition.    This patient is being managed with:   Diet Renal Restrictions-  For patients receiving Renal Replacement - No Protein Restr No Conc K No Conc Phos Low Sodium  Consistent Carbohydrate {Evening Snack} (CSTCHOSN)  Supplement Feeding Modality:  Oral  Nepro Cans or Servings Per Day:  2       Frequency:  Daily  Entered: Sep 21 2023  1:43PM  
This patient has been assessed with a concern for Malnutrition and has been determined to have a diagnosis/diagnoses of Moderate protein-calorie malnutrition.    This patient is being managed with:   Diet NPO after Midnight-     NPO Start Date: 25-Sep-2023   NPO Start Time: 23:59  Except Medications  Entered: Sep 25 2023  2:18PM    Diet Renal Restrictions-  For patients receiving Renal Replacement - No Protein Restr No Conc K No Conc Phos Low Sodium  Consistent Carbohydrate {Evening Snack} (CSTCHOSN)  Supplement Feeding Modality:  Oral  Nepro Cans or Servings Per Day:  2       Frequency:  Daily  Entered: Sep 25 2023  1:33PM

## 2023-09-29 LAB
-  AMIKACIN: SIGNIFICANT CHANGE UP
-  AMOXICILLIN/CLAVULANIC ACID: SIGNIFICANT CHANGE UP
-  AMPICILLIN/SULBACTAM: SIGNIFICANT CHANGE UP
-  AMPICILLIN: SIGNIFICANT CHANGE UP
-  AZTREONAM: SIGNIFICANT CHANGE UP
-  CEFAZOLIN: SIGNIFICANT CHANGE UP
-  CEFEPIME: SIGNIFICANT CHANGE UP
-  CEFTRIAXONE: SIGNIFICANT CHANGE UP
-  CEFUROXIME: SIGNIFICANT CHANGE UP
-  CIPROFLOXACIN: SIGNIFICANT CHANGE UP
-  ERTAPENEM: SIGNIFICANT CHANGE UP
-  GENTAMICIN: SIGNIFICANT CHANGE UP
-  IMIPENEM: SIGNIFICANT CHANGE UP
-  LEVOFLOXACIN: SIGNIFICANT CHANGE UP
-  MEROPENEM: SIGNIFICANT CHANGE UP
-  NITROFURANTOIN: SIGNIFICANT CHANGE UP
-  PIPERACILLIN/TAZOBACTAM: SIGNIFICANT CHANGE UP
-  TOBRAMYCIN: SIGNIFICANT CHANGE UP
-  TRIMETHOPRIM/SULFAMETHOXAZOLE: SIGNIFICANT CHANGE UP
CULTURE RESULTS: SIGNIFICANT CHANGE UP
METHOD TYPE: SIGNIFICANT CHANGE UP
ORGANISM # SPEC MICROSCOPIC CNT: SIGNIFICANT CHANGE UP
ORGANISM # SPEC MICROSCOPIC CNT: SIGNIFICANT CHANGE UP
SPECIMEN SOURCE: SIGNIFICANT CHANGE UP

## 2023-09-29 RX ORDER — CIPROFLOXACIN LACTATE 400MG/40ML
1 VIAL (ML) INTRAVENOUS
Qty: 7 | Refills: 0
Start: 2023-09-29 | End: 2023-10-05

## 2023-09-29 NOTE — CHART NOTE - NSCHARTNOTEFT_GEN_A_CORE
Patient's urine culture resulted 9/29 after dc on 9/28, growing E Coli ESBL. Sensitive to fluoroquinolones. Prescribed ciprofloxacin 500 mg q24h (renally dosed) x 7 days and instructed patient to pick it up at her pharmacy. Patient expressed understanding and amenable to plan.    Nitesh Michelle  PGY-2, Internal Medicine  Available on [employer] Teams  Pager: [phone] ([facility]), [phone] ([place])
pt seen and examined this morning in ER  full consult note to follow  Progressed CKD Stage V  was at [facility] early this month for dialysis initiation and she left [facility]  didn't feel well these past three days- fever, body aches, weakness so presented to [facility]  #viral syndrome now +rhinovirus; supportive management as needed  # ckd stage V  will need to initiate hd this admission  please contact IR for dolores  #check [employer]  # anemia in ckd  check ferritin, iron studies  #hypocalcemia  check albumin  check pth  #hyperphosphatemia- start phoslo 667mg po tid with meals  #met acidosis  start sodium bicarb tabs 650mg po tid    will follow

## 2023-10-11 ENCOUNTER — APPOINTMENT (OUTPATIENT)
Dept: PULMONOLOGY | Facility: CLINIC | Age: 53
End: 2023-10-11

## 2023-10-11 NOTE — END OF VISIT
[FreeTextEntry3] : I was physically present for the key portions of the evaluation and management (E/M) service provided. I agree with the above history, ROS, physical exam, assessment and plan which I have reviewed and edited where appropriate. I have also reviewed the assessment and management of CKD, HTN and LE edema with the patient during clinic visit today.\par \par Pt. with advanced CKD stage 5. Last Scr elevated at 7.28 on 5/3/23. Pt. continues to refuse HD initiation. Check renal panel today. Pt. with significant LE edema on exam today. Metolazone dose increased to 5 mg BID. Low salt diet and fluid restriction advised. Avoid nephrotoxins. Where Do You Want The Question To Include Opioid Counseling Located?: Case Summary Tab

## 2023-10-25 ENCOUNTER — INPATIENT (INPATIENT)
Facility: HOSPITAL | Age: 53
LOS: 1 days | Discharge: HOME | DRG: 312 | End: 2023-10-27
Attending: STUDENT IN AN ORGANIZED HEALTH CARE EDUCATION/TRAINING PROGRAM | Admitting: INTERNAL MEDICINE
Payer: MEDICARE

## 2023-10-25 VITALS
OXYGEN SATURATION: 99 % | HEART RATE: 104 BPM | SYSTOLIC BLOOD PRESSURE: 129 MMHG | TEMPERATURE: 98 F | DIASTOLIC BLOOD PRESSURE: 76 MMHG | RESPIRATION RATE: 17 BRPM | WEIGHT: 108.03 LBS

## 2023-10-25 DIAGNOSIS — N18.6 END STAGE RENAL DISEASE: ICD-10-CM

## 2023-10-25 DIAGNOSIS — E11.9 TYPE 2 DIABETES MELLITUS WITHOUT COMPLICATIONS: ICD-10-CM

## 2023-10-25 DIAGNOSIS — D86.9 SARCOIDOSIS, UNSPECIFIED: ICD-10-CM

## 2023-10-25 DIAGNOSIS — I95.1 ORTHOSTATIC HYPOTENSION: ICD-10-CM

## 2023-10-25 DIAGNOSIS — Z29.9 ENCOUNTER FOR PROPHYLACTIC MEASURES, UNSPECIFIED: ICD-10-CM

## 2023-10-25 DIAGNOSIS — I10 ESSENTIAL (PRIMARY) HYPERTENSION: ICD-10-CM

## 2023-10-25 DIAGNOSIS — R42 DIZZINESS AND GIDDINESS: ICD-10-CM

## 2023-10-25 LAB
ALBUMIN SERPL ELPH-MCNC: 3.7 G/DL — SIGNIFICANT CHANGE UP (ref 3.3–5)
ALBUMIN SERPL ELPH-MCNC: 3.7 G/DL — SIGNIFICANT CHANGE UP (ref 3.3–5)
ALP SERPL-CCNC: 59 U/L — SIGNIFICANT CHANGE UP (ref 40–120)
ALP SERPL-CCNC: 59 U/L — SIGNIFICANT CHANGE UP (ref 40–120)
ALT FLD-CCNC: 14 U/L — SIGNIFICANT CHANGE UP (ref 10–45)
ALT FLD-CCNC: 14 U/L — SIGNIFICANT CHANGE UP (ref 10–45)
ANION GAP SERPL CALC-SCNC: 18 MMOL/L — HIGH (ref 5–17)
ANION GAP SERPL CALC-SCNC: 18 MMOL/L — HIGH (ref 5–17)
AST SERPL-CCNC: 30 U/L — SIGNIFICANT CHANGE UP (ref 10–40)
AST SERPL-CCNC: 30 U/L — SIGNIFICANT CHANGE UP (ref 10–40)
BASE EXCESS BLDV CALC-SCNC: 2.8 MMOL/L — SIGNIFICANT CHANGE UP (ref -2–3)
BASE EXCESS BLDV CALC-SCNC: 2.8 MMOL/L — SIGNIFICANT CHANGE UP (ref -2–3)
BASOPHILS # BLD AUTO: 0.04 K/UL — SIGNIFICANT CHANGE UP (ref 0–0.2)
BASOPHILS # BLD AUTO: 0.04 K/UL — SIGNIFICANT CHANGE UP (ref 0–0.2)
BASOPHILS NFR BLD AUTO: 0.8 % — SIGNIFICANT CHANGE UP (ref 0–2)
BASOPHILS NFR BLD AUTO: 0.8 % — SIGNIFICANT CHANGE UP (ref 0–2)
BILIRUB SERPL-MCNC: 0.4 MG/DL — SIGNIFICANT CHANGE UP (ref 0.2–1.2)
BILIRUB SERPL-MCNC: 0.4 MG/DL — SIGNIFICANT CHANGE UP (ref 0.2–1.2)
BUN SERPL-MCNC: 12 MG/DL — SIGNIFICANT CHANGE UP (ref 7–23)
BUN SERPL-MCNC: 12 MG/DL — SIGNIFICANT CHANGE UP (ref 7–23)
CA-I SERPL-SCNC: 1.13 MMOL/L — LOW (ref 1.15–1.33)
CA-I SERPL-SCNC: 1.13 MMOL/L — LOW (ref 1.15–1.33)
CALCIUM SERPL-MCNC: 9.5 MG/DL — SIGNIFICANT CHANGE UP (ref 8.4–10.5)
CALCIUM SERPL-MCNC: 9.5 MG/DL — SIGNIFICANT CHANGE UP (ref 8.4–10.5)
CHLORIDE BLDV-SCNC: 104 MMOL/L — SIGNIFICANT CHANGE UP (ref 96–108)
CHLORIDE BLDV-SCNC: 104 MMOL/L — SIGNIFICANT CHANGE UP (ref 96–108)
CHLORIDE SERPL-SCNC: 99 MMOL/L — SIGNIFICANT CHANGE UP (ref 96–108)
CHLORIDE SERPL-SCNC: 99 MMOL/L — SIGNIFICANT CHANGE UP (ref 96–108)
CO2 BLDV-SCNC: 28 MMOL/L — HIGH (ref 22–26)
CO2 BLDV-SCNC: 28 MMOL/L — HIGH (ref 22–26)
CO2 SERPL-SCNC: 23 MMOL/L — SIGNIFICANT CHANGE UP (ref 22–31)
CO2 SERPL-SCNC: 23 MMOL/L — SIGNIFICANT CHANGE UP (ref 22–31)
CREAT SERPL-MCNC: 5.9 MG/DL — HIGH (ref 0.5–1.3)
CREAT SERPL-MCNC: 5.9 MG/DL — HIGH (ref 0.5–1.3)
EGFR: 8 ML/MIN/1.73M2 — LOW
EGFR: 8 ML/MIN/1.73M2 — LOW
EOSINOPHIL # BLD AUTO: 0.24 K/UL — SIGNIFICANT CHANGE UP (ref 0–0.5)
EOSINOPHIL # BLD AUTO: 0.24 K/UL — SIGNIFICANT CHANGE UP (ref 0–0.5)
EOSINOPHIL NFR BLD AUTO: 4.9 % — SIGNIFICANT CHANGE UP (ref 0–6)
EOSINOPHIL NFR BLD AUTO: 4.9 % — SIGNIFICANT CHANGE UP (ref 0–6)
GAS PNL BLDV: 135 MMOL/L — LOW (ref 136–145)
GAS PNL BLDV: 135 MMOL/L — LOW (ref 136–145)
GAS PNL BLDV: SIGNIFICANT CHANGE UP
GLUCOSE BLDV-MCNC: 245 MG/DL — HIGH (ref 70–99)
GLUCOSE BLDV-MCNC: 245 MG/DL — HIGH (ref 70–99)
GLUCOSE SERPL-MCNC: 236 MG/DL — HIGH (ref 70–99)
GLUCOSE SERPL-MCNC: 236 MG/DL — HIGH (ref 70–99)
HCO3 BLDV-SCNC: 27 MMOL/L — SIGNIFICANT CHANGE UP (ref 22–29)
HCO3 BLDV-SCNC: 27 MMOL/L — SIGNIFICANT CHANGE UP (ref 22–29)
HCT VFR BLD CALC: 34.8 % — SIGNIFICANT CHANGE UP (ref 34.5–45)
HCT VFR BLD CALC: 34.8 % — SIGNIFICANT CHANGE UP (ref 34.5–45)
HCT VFR BLDA CALC: 33 % — LOW (ref 34.5–46.5)
HCT VFR BLDA CALC: 33 % — LOW (ref 34.5–46.5)
HGB BLD CALC-MCNC: 11 G/DL — LOW (ref 11.7–16.1)
HGB BLD CALC-MCNC: 11 G/DL — LOW (ref 11.7–16.1)
HGB BLD-MCNC: 11.3 G/DL — LOW (ref 11.5–15.5)
HGB BLD-MCNC: 11.3 G/DL — LOW (ref 11.5–15.5)
IMM GRANULOCYTES NFR BLD AUTO: 0.8 % — SIGNIFICANT CHANGE UP (ref 0–0.9)
IMM GRANULOCYTES NFR BLD AUTO: 0.8 % — SIGNIFICANT CHANGE UP (ref 0–0.9)
LACTATE BLDV-MCNC: 1.1 MMOL/L — SIGNIFICANT CHANGE UP (ref 0.5–2)
LACTATE BLDV-MCNC: 1.1 MMOL/L — SIGNIFICANT CHANGE UP (ref 0.5–2)
LYMPHOCYTES # BLD AUTO: 1.67 K/UL — SIGNIFICANT CHANGE UP (ref 1–3.3)
LYMPHOCYTES # BLD AUTO: 1.67 K/UL — SIGNIFICANT CHANGE UP (ref 1–3.3)
LYMPHOCYTES # BLD AUTO: 34.2 % — SIGNIFICANT CHANGE UP (ref 13–44)
LYMPHOCYTES # BLD AUTO: 34.2 % — SIGNIFICANT CHANGE UP (ref 13–44)
MAGNESIUM SERPL-MCNC: 2.2 MG/DL — SIGNIFICANT CHANGE UP (ref 1.6–2.6)
MAGNESIUM SERPL-MCNC: 2.2 MG/DL — SIGNIFICANT CHANGE UP (ref 1.6–2.6)
MCHC RBC-ENTMCNC: 27.8 PG — SIGNIFICANT CHANGE UP (ref 27–34)
MCHC RBC-ENTMCNC: 27.8 PG — SIGNIFICANT CHANGE UP (ref 27–34)
MCHC RBC-ENTMCNC: 32.5 GM/DL — SIGNIFICANT CHANGE UP (ref 32–36)
MCHC RBC-ENTMCNC: 32.5 GM/DL — SIGNIFICANT CHANGE UP (ref 32–36)
MCV RBC AUTO: 85.7 FL — SIGNIFICANT CHANGE UP (ref 80–100)
MCV RBC AUTO: 85.7 FL — SIGNIFICANT CHANGE UP (ref 80–100)
MONOCYTES # BLD AUTO: 0.38 K/UL — SIGNIFICANT CHANGE UP (ref 0–0.9)
MONOCYTES # BLD AUTO: 0.38 K/UL — SIGNIFICANT CHANGE UP (ref 0–0.9)
MONOCYTES NFR BLD AUTO: 7.8 % — SIGNIFICANT CHANGE UP (ref 2–14)
MONOCYTES NFR BLD AUTO: 7.8 % — SIGNIFICANT CHANGE UP (ref 2–14)
NEUTROPHILS # BLD AUTO: 2.51 K/UL — SIGNIFICANT CHANGE UP (ref 1.8–7.4)
NEUTROPHILS # BLD AUTO: 2.51 K/UL — SIGNIFICANT CHANGE UP (ref 1.8–7.4)
NEUTROPHILS NFR BLD AUTO: 51.5 % — SIGNIFICANT CHANGE UP (ref 43–77)
NEUTROPHILS NFR BLD AUTO: 51.5 % — SIGNIFICANT CHANGE UP (ref 43–77)
NRBC # BLD: 0 /100 WBCS — SIGNIFICANT CHANGE UP (ref 0–0)
NRBC # BLD: 0 /100 WBCS — SIGNIFICANT CHANGE UP (ref 0–0)
PCO2 BLDV: 40 MMHG — SIGNIFICANT CHANGE UP (ref 39–42)
PCO2 BLDV: 40 MMHG — SIGNIFICANT CHANGE UP (ref 39–42)
PH BLDV: 7.44 — HIGH (ref 7.32–7.43)
PH BLDV: 7.44 — HIGH (ref 7.32–7.43)
PHOSPHATE SERPL-MCNC: 5.5 MG/DL — HIGH (ref 2.5–4.5)
PHOSPHATE SERPL-MCNC: 5.5 MG/DL — HIGH (ref 2.5–4.5)
PLATELET # BLD AUTO: 265 K/UL — SIGNIFICANT CHANGE UP (ref 150–400)
PLATELET # BLD AUTO: 265 K/UL — SIGNIFICANT CHANGE UP (ref 150–400)
PO2 BLDV: 84 MMHG — HIGH (ref 25–45)
PO2 BLDV: 84 MMHG — HIGH (ref 25–45)
POTASSIUM BLDV-SCNC: 3.6 MMOL/L — SIGNIFICANT CHANGE UP (ref 3.5–5.1)
POTASSIUM BLDV-SCNC: 3.6 MMOL/L — SIGNIFICANT CHANGE UP (ref 3.5–5.1)
POTASSIUM SERPL-MCNC: 4 MMOL/L — SIGNIFICANT CHANGE UP (ref 3.5–5.3)
POTASSIUM SERPL-MCNC: 4 MMOL/L — SIGNIFICANT CHANGE UP (ref 3.5–5.3)
POTASSIUM SERPL-SCNC: 4 MMOL/L — SIGNIFICANT CHANGE UP (ref 3.5–5.3)
POTASSIUM SERPL-SCNC: 4 MMOL/L — SIGNIFICANT CHANGE UP (ref 3.5–5.3)
PROT SERPL-MCNC: 7 G/DL — SIGNIFICANT CHANGE UP (ref 6–8.3)
PROT SERPL-MCNC: 7 G/DL — SIGNIFICANT CHANGE UP (ref 6–8.3)
RBC # BLD: 4.06 M/UL — SIGNIFICANT CHANGE UP (ref 3.8–5.2)
RBC # BLD: 4.06 M/UL — SIGNIFICANT CHANGE UP (ref 3.8–5.2)
RBC # FLD: 14.6 % — HIGH (ref 10.3–14.5)
RBC # FLD: 14.6 % — HIGH (ref 10.3–14.5)
SAO2 % BLDV: 97.9 % — HIGH (ref 67–88)
SAO2 % BLDV: 97.9 % — HIGH (ref 67–88)
SODIUM SERPL-SCNC: 140 MMOL/L — SIGNIFICANT CHANGE UP (ref 135–145)
SODIUM SERPL-SCNC: 140 MMOL/L — SIGNIFICANT CHANGE UP (ref 135–145)
WBC # BLD: 4.88 K/UL — SIGNIFICANT CHANGE UP (ref 3.8–10.5)
WBC # BLD: 4.88 K/UL — SIGNIFICANT CHANGE UP (ref 3.8–10.5)
WBC # FLD AUTO: 4.88 K/UL — SIGNIFICANT CHANGE UP (ref 3.8–10.5)
WBC # FLD AUTO: 4.88 K/UL — SIGNIFICANT CHANGE UP (ref 3.8–10.5)

## 2023-10-25 PROCEDURE — 99285 EMERGENCY DEPT VISIT HI MDM: CPT

## 2023-10-25 PROCEDURE — 71045 X-RAY EXAM CHEST 1 VIEW: CPT | Mod: 26

## 2023-10-25 PROCEDURE — 99223 1ST HOSP IP/OBS HIGH 75: CPT

## 2023-10-25 RX ORDER — AMLODIPINE BESYLATE 2.5 MG/1
10 TABLET ORAL DAILY
Refills: 0 | Status: DISCONTINUED | OUTPATIENT
Start: 2023-10-26 | End: 2023-10-27

## 2023-10-25 RX ORDER — DEXTROSE 50 % IN WATER 50 %
25 SYRINGE (ML) INTRAVENOUS ONCE
Refills: 0 | Status: DISCONTINUED | OUTPATIENT
Start: 2023-10-25 | End: 2023-10-27

## 2023-10-25 RX ORDER — DEXTROSE 50 % IN WATER 50 %
15 SYRINGE (ML) INTRAVENOUS ONCE
Refills: 0 | Status: DISCONTINUED | OUTPATIENT
Start: 2023-10-25 | End: 2023-10-27

## 2023-10-25 RX ORDER — INSULIN LISPRO 100/ML
VIAL (ML) SUBCUTANEOUS
Refills: 0 | Status: DISCONTINUED | OUTPATIENT
Start: 2023-10-25 | End: 2023-10-27

## 2023-10-25 RX ORDER — SODIUM CHLORIDE 9 MG/ML
1000 INJECTION INTRAMUSCULAR; INTRAVENOUS; SUBCUTANEOUS
Refills: 0 | Status: DISCONTINUED | OUTPATIENT
Start: 2023-10-25 | End: 2023-10-25

## 2023-10-25 RX ORDER — SEVELAMER CARBONATE 2400 MG/1
800 POWDER, FOR SUSPENSION ORAL THREE TIMES A DAY
Refills: 0 | Status: DISCONTINUED | OUTPATIENT
Start: 2023-10-25 | End: 2023-10-27

## 2023-10-25 RX ORDER — LANOLIN ALCOHOL/MO/W.PET/CERES
3 CREAM (GRAM) TOPICAL AT BEDTIME
Refills: 0 | Status: DISCONTINUED | OUTPATIENT
Start: 2023-10-25 | End: 2023-10-27

## 2023-10-25 RX ORDER — GABAPENTIN 400 MG/1
200 CAPSULE ORAL ONCE
Refills: 0 | Status: COMPLETED | OUTPATIENT
Start: 2023-10-25 | End: 2023-10-25

## 2023-10-25 RX ORDER — SODIUM CHLORIDE 9 MG/ML
1000 INJECTION, SOLUTION INTRAVENOUS
Refills: 0 | Status: DISCONTINUED | OUTPATIENT
Start: 2023-10-25 | End: 2023-10-27

## 2023-10-25 RX ORDER — INSULIN GLARGINE 100 [IU]/ML
10 INJECTION, SOLUTION SUBCUTANEOUS AT BEDTIME
Refills: 0 | Status: DISCONTINUED | OUTPATIENT
Start: 2023-10-25 | End: 2023-10-26

## 2023-10-25 RX ORDER — METOPROLOL TARTRATE 50 MG
25 TABLET ORAL DAILY
Refills: 0 | Status: DISCONTINUED | OUTPATIENT
Start: 2023-10-25 | End: 2023-10-27

## 2023-10-25 RX ORDER — GABAPENTIN 400 MG/1
100 CAPSULE ORAL ONCE
Refills: 0 | Status: DISCONTINUED | OUTPATIENT
Start: 2023-10-25 | End: 2023-10-25

## 2023-10-25 RX ORDER — GLUCAGON INJECTION, SOLUTION 0.5 MG/.1ML
1 INJECTION, SOLUTION SUBCUTANEOUS ONCE
Refills: 0 | Status: DISCONTINUED | OUTPATIENT
Start: 2023-10-25 | End: 2023-10-27

## 2023-10-25 RX ORDER — HEPARIN SODIUM 5000 [USP'U]/ML
5000 INJECTION INTRAVENOUS; SUBCUTANEOUS EVERY 8 HOURS
Refills: 0 | Status: DISCONTINUED | OUTPATIENT
Start: 2023-10-25 | End: 2023-10-27

## 2023-10-25 RX ORDER — GABAPENTIN 400 MG/1
200 CAPSULE ORAL THREE TIMES A DAY
Refills: 0 | Status: DISCONTINUED | OUTPATIENT
Start: 2023-10-25 | End: 2023-10-25

## 2023-10-25 RX ORDER — INFLUENZA VIRUS VACCINE 15; 15; 15; 15 UG/.5ML; UG/.5ML; UG/.5ML; UG/.5ML
0.5 SUSPENSION INTRAMUSCULAR ONCE
Refills: 0 | Status: DISCONTINUED | OUTPATIENT
Start: 2023-10-25 | End: 2023-10-27

## 2023-10-25 RX ORDER — ATORVASTATIN CALCIUM 80 MG/1
80 TABLET, FILM COATED ORAL AT BEDTIME
Refills: 0 | Status: DISCONTINUED | OUTPATIENT
Start: 2023-10-25 | End: 2023-10-27

## 2023-10-25 RX ORDER — ACETAMINOPHEN 500 MG
650 TABLET ORAL EVERY 6 HOURS
Refills: 0 | Status: DISCONTINUED | OUTPATIENT
Start: 2023-10-25 | End: 2023-10-27

## 2023-10-25 RX ORDER — DEXTROSE 50 % IN WATER 50 %
12.5 SYRINGE (ML) INTRAVENOUS ONCE
Refills: 0 | Status: DISCONTINUED | OUTPATIENT
Start: 2023-10-25 | End: 2023-10-27

## 2023-10-25 RX ORDER — SODIUM BICARBONATE 1 MEQ/ML
650 SYRINGE (ML) INTRAVENOUS THREE TIMES A DAY
Refills: 0 | Status: DISCONTINUED | OUTPATIENT
Start: 2023-10-25 | End: 2023-10-27

## 2023-10-25 RX ORDER — GABAPENTIN 400 MG/1
100 CAPSULE ORAL THREE TIMES A DAY
Refills: 0 | Status: DISCONTINUED | OUTPATIENT
Start: 2023-10-25 | End: 2023-10-25

## 2023-10-25 RX ORDER — SODIUM CHLORIDE 9 MG/ML
500 INJECTION INTRAMUSCULAR; INTRAVENOUS; SUBCUTANEOUS ONCE
Refills: 0 | Status: COMPLETED | OUTPATIENT
Start: 2023-10-25 | End: 2023-10-25

## 2023-10-25 RX ORDER — INSULIN LISPRO 100/ML
VIAL (ML) SUBCUTANEOUS AT BEDTIME
Refills: 0 | Status: DISCONTINUED | OUTPATIENT
Start: 2023-10-25 | End: 2023-10-27

## 2023-10-25 RX ORDER — GABAPENTIN 400 MG/1
300 CAPSULE ORAL DAILY
Refills: 0 | Status: DISCONTINUED | OUTPATIENT
Start: 2023-10-26 | End: 2023-10-27

## 2023-10-25 RX ADMIN — GABAPENTIN 200 MILLIGRAM(S): 400 CAPSULE ORAL at 12:48

## 2023-10-25 RX ADMIN — SODIUM CHLORIDE 500 MILLILITER(S): 9 INJECTION INTRAMUSCULAR; INTRAVENOUS; SUBCUTANEOUS at 08:03

## 2023-10-25 RX ADMIN — SEVELAMER CARBONATE 800 MILLIGRAM(S): 2400 POWDER, FOR SUSPENSION ORAL at 22:47

## 2023-10-25 RX ADMIN — Medication 10 MILLIGRAM(S): at 12:14

## 2023-10-25 RX ADMIN — INSULIN GLARGINE 10 UNIT(S): 100 INJECTION, SOLUTION SUBCUTANEOUS at 22:54

## 2023-10-25 RX ADMIN — Medication 25 MILLIGRAM(S): at 12:15

## 2023-10-25 RX ADMIN — Medication 650 MILLIGRAM(S): at 18:54

## 2023-10-25 RX ADMIN — Medication 650 MILLIGRAM(S): at 17:00

## 2023-10-25 RX ADMIN — SODIUM CHLORIDE 75 MILLILITER(S): 9 INJECTION INTRAMUSCULAR; INTRAVENOUS; SUBCUTANEOUS at 11:42

## 2023-10-25 RX ADMIN — SEVELAMER CARBONATE 800 MILLIGRAM(S): 2400 POWDER, FOR SUSPENSION ORAL at 12:20

## 2023-10-25 RX ADMIN — Medication 4: at 16:48

## 2023-10-25 RX ADMIN — HEPARIN SODIUM 5000 UNIT(S): 5000 INJECTION INTRAVENOUS; SUBCUTANEOUS at 22:48

## 2023-10-25 RX ADMIN — ATORVASTATIN CALCIUM 80 MILLIGRAM(S): 80 TABLET, FILM COATED ORAL at 22:47

## 2023-10-25 RX ADMIN — GABAPENTIN 100 MILLIGRAM(S): 400 CAPSULE ORAL at 12:15

## 2023-10-25 RX ADMIN — Medication 650 MILLIGRAM(S): at 12:15

## 2023-10-25 RX ADMIN — Medication 650 MILLIGRAM(S): at 22:48

## 2023-10-25 NOTE — PHYSICAL THERAPY INITIAL EVALUATION ADULT - STRENGTHENING, PT EVAL
Syncope GOAL: Patient will demonstrate a 1/3 increase in strength where deficient to assist with performing functional mobility and ADLs.

## 2023-10-25 NOTE — PHYSICAL THERAPY INITIAL EVALUATION ADULT - PERTINENT HX OF CURRENT PROBLEM, REHAB EVAL
54 y/o F w/ PMHx of ESRD on HD M/W/F, T2DM, HTN, sarcoidosis presents with lightheadedness/dizziness. Patient reports she was in her usual state of health until this weekend when she started having diarrhea. Reports having over 5 episodes of loose stool per day. No abd pain, n/v. Also admits to poor PO intake since then. Diarrhea has now resolved. This morning she woke up and went to her usual HD session and starting feeling unsteady on her feet due to lightheadedness and dizziness. She denies any falls, syncopal episodes, CP, SOB, palpitations, abd pain, dysuria, fevers, chills. 10/25 Chest Xray: Clear Lungs.

## 2023-10-25 NOTE — PHYSICAL THERAPY INITIAL EVALUATION ADULT - ADDITIONAL COMMENTS
Pt lives in a private home with her children, 13 steps to bedroom. Pt reports that she is mostly independent with functional mobility and ADLs, reports her children assist her with stairs and some ADL. Pt occasionally uses a RW, but doesn't often leave the house. Pt lives in a private home with her children, 13 steps to bedroom. Pt reports that she is mostly independent with functional mobility and ADLs, reports her children assist her with stairs and some ADL. Pt occasionally uses a RW, but doesn't often leave the house. Pt owns RW, shower chair and commode.

## 2023-10-25 NOTE — ED PROVIDER NOTE - OBJECTIVE STATEMENT
54 yo F with a PMH of ESRD not on HD-MWF, poorly controlled Type 2 DM on insulin, HTN, and sarcoidosis presents with dizziness x this AM. Pt states she feels like everything is spinning when she stands up from sitting or laying down. Has never had this before. Went to HD but was not dialyzed 2/2 sxs. Providers there also noted pts BP to be low and advised she be transferred to ED. Of note pt had multiple episodes of watery diarrhea last week- that has since resolved. Has been eating/drinking normally. Still voids, denies urinary complaints. No chest pain or SOB. Denies nausea, vomiting, fever, chills, headadche, changes in speech/va, paresthesias, facial asymetrry, syncope. Follows with Dr. Jose M Guerrero

## 2023-10-25 NOTE — H&P ADULT - PROBLEM SELECTOR PLAN 3
C/w metoprolol and norvasc for now with holding parameters  Holding home diuretics and hydralazine; resume pending BP trend   Orthostatics as above

## 2023-10-25 NOTE — H&P ADULT - PROBLEM SELECTOR PLAN 4
-Lantus 10u qhs; increase to home insulin regimen pending FS trend  -ISS  Carb consistent diet  -F/u A1C  -D/w pharmacy - renal dose - Gabapentin 300mg daily for neuropathic pain

## 2023-10-25 NOTE — H&P ADULT - NSHPLABSRESULTS_GEN_ALL_CORE
LABS:                         11.3   4.88  )-----------( 265      ( 25 Oct 2023 08:14 )             34.8     10-25    140  |  99  |  12  ----------------------------<  236<H>  4.0   |  23  |  5.90<H>    Ca    9.5      25 Oct 2023 08:14  Phos  5.5     10-25  Mg     2.2     10-25    TPro  7.0  /  Alb  3.7  /  TBili  0.4  /  DBili  x   /  AST  30  /  ALT  14  /  AlkPhos  59  10-25      Urinalysis Basic - ( 25 Oct 2023 08:14 )    Color: x / Appearance: x / SG: x / pH: x  Gluc: 236 mg/dL / Ketone: x  / Bili: x / Urobili: x   Blood: x / Protein: x / Nitrite: x   Leuk Esterase: x / RBC: x / WBC x   Sq Epi: x / Non Sq Epi: x / Bacteria: x      EKG: sinus tach  CXR: no pulmonary processes

## 2023-10-25 NOTE — ED ADULT NURSE NOTE - OBJECTIVE STATEMENT
Pt is a 53 yr old female with extensive pmh including new kidney failure started on dialysis MWF a month ago (pt has right upper chest shiley) coming from dialysis center for hypotension. Pt states she has had decreased PO intake since Friday- and has been drinking less fluids (pt is on fluid restriction and is nervous about drinking too much fluid). PT endorses dizziness when standing up- and while at the dialysis center had an episode of hypotension and dizziness. ems was called and pt brought to the ED. PT has had an episode similar to this in the past but states it was not as severe. Pt did not receive any dialysis today- but was able to complete the full session on Monday. Pt is a/ox 3- orthostatic positive when standing.

## 2023-10-25 NOTE — PATIENT PROFILE ADULT - FALL HARM RISK - HARM RISK INTERVENTIONS
Assistance with ambulation/Assistance OOB with selected safe patient handling equipment/Communicate Risk of Fall with Harm to all staff/Discuss with provider need for PT consult/Monitor gait and stability/Orthostatic vital signs/Provide patient with walking aids - walker, cane, crutches/Reinforce activity limits and safety measures with patient and family/Sit up slowly, dangle for a short time, stand at bedside before walking/Tailored Fall Risk Interventions/Use of alarms - bed, chair and/or voice tab/Visual Cue: Yellow wristband and red socks/Bed in lowest position, wheels locked, appropriate side rails in place/Call bell, personal items and telephone in reach/Instruct patient to call for assistance before getting out of bed or chair/Non-slip footwear when patient is out of bed/[place] to call system/Physically safe environment - no spills, clutter or unnecessary equipment/Purposeful Proactive Rounding/Room/bathroom lighting operational, light cord in reach

## 2023-10-25 NOTE — H&P ADULT - NSHPPHYSICALEXAM_GEN_ALL_CORE
Vital Signs Last 24 Hrs  T(C): 36.6 (25 Oct 2023 07:45), Max: 36.6 (25 Oct 2023 07:21)  T(F): 97.9 (25 Oct 2023 07:45), Max: 97.9 (25 Oct 2023 07:21)  HR: 109 (25 Oct 2023 09:30) (104 - 109)  BP: 139/77 (25 Oct 2023 09:30) (129/76 - 139/77)  BP(mean): 95 (25 Oct 2023 09:30) (95 - 95)  RR: 16 (25 Oct 2023 09:30) (16 - 18)  SpO2: 98% (25 Oct 2023 09:30) (98% - 100%)    Parameters below as of 25 Oct 2023 09:30  Patient On (Oxygen Delivery Method): room air        CONSTITUTIONAL: Well-groomed, in no apparent distress  EYES: No conjunctival or scleral injection, non-icteric; EOMI  ENMT: No external nasal lesions; no pharyngeal injection or exudates, oral mucosa with moist membranes  NECK: Supple; Trachea midline   RESPIRATORY: Breathing comfortably; lungs CTA without wheeze  CARDIOVASCULAR: +S1S2, RRR; no lower extremity edema  GASTROINTESTINAL: No tenderness, +BS throughout, no rebound/guarding  MUSCULOSKELETAL: normal strength and tone of extremities  NEUROLOGIC: grossly nonfocal; sensation intact in LEs b/l to light touch  PSYCHIATRIC: A+O x 3; mood and affect appropriate

## 2023-10-25 NOTE — CONSULT NOTE ADULT - SUBJECTIVE AND OBJECTIVE BOX
[facility] Consult Note Nephrology - CONSULTATION NOTE    52 y/o F w/ PMHx of ESRD on HD M/W/F, T2DM, HTN, sarcoidosis presents with lightheadedness/dizziness.   Patient reports she was in her usual state of health until this weekend when she started having diarrhea. Reports having over 5 episodes of loose stool per day. No abd pain, n/v. Also admits to poor PO intake since then. Diarrhea has now resolved. This morning she woke up and went to her usual HD session and starting feeling unsteady on her feet due to lightheadedness and dizziness. She denies any falls, syncopal episodes, CP, SOB, palpitations, abd pain, dysuria, fevers, chills.     In the ED, afebrile, , 129/76, 99% on RA. Positive orthostatics in ED. Given NS 500cc bolus. Admitted to medicine for further management.    Renal consult for esrd on hd  last HD Monday  Was complaining of dizziness- now on IVF  says the dizziness is better  did not get HD today  Denies any f/c/n/v currently       PAST MEDICAL & SURGICAL HISTORY:  Sarcoidosis      Diabetes      Hypertension      Hyperlipidemia      Chronic kidney disease (CKD), stage III (moderate)      Seizure disorder      No significant past surgical history        No Known Allergies    Home Medications Reviewed  Hospital Medications:   MEDICATIONS  (STANDING):  atorvastatin 80 milliGRAM(s) Oral at bedtime  dextrose 5%. 1000 milliLiter(s) (50 mL/Hr) IV Continuous <Continuous>  dextrose 5%. 1000 milliLiter(s) (100 mL/Hr) IV Continuous <Continuous>  dextrose 50% Injectable 25 Gram(s) IV Push once  dextrose 50% Injectable 25 Gram(s) IV Push once  dextrose 50% Injectable 12.5 Gram(s) IV Push once  glucagon  Injectable 1 milliGRAM(s) IntraMuscular once  heparin   Injectable 5000 Unit(s) SubCutaneous every 8 hours  insulin glargine Injectable (LANTUS) 10 Unit(s) SubCutaneous at bedtime  insulin lispro (ADMELOG) corrective regimen sliding scale   SubCutaneous at bedtime  insulin lispro (ADMELOG) corrective regimen sliding scale   SubCutaneous three times a day before meals  metoprolol succinate ER 25 milliGRAM(s) Oral daily  predniSONE   Tablet 10 milliGRAM(s) Oral daily  sevelamer carbonate 800 milliGRAM(s) Oral three times a day  sodium bicarbonate 650 milliGRAM(s) Oral three times a day  sodium chloride 0.9%. 1000 milliLiter(s) (75 mL/Hr) IV Continuous <Continuous>    SOCIAL HISTORY:  Denies ETOh,Smoking,   FAMILY HISTORY:  Family history of diabetes mellitus in first degree relative      REVIEW OF SYSTEMS:  CONSTITUTIONAL: +weakness  EYES/ENT: No visual changes;  No vertigo or throat pain   NECK: No pain or stiffness  RESPIRATORY: No cough, wheezing, hemoptysis; No shortness of breath  CARDIOVASCULAR: No chest pain or palpitations.  GASTROINTESTINAL: No abdominal or epigastric pain. No nausea, vomiting, or hematemesis; No diarrhea or constipation. No melena or hematochezia.  GENITOURINARY: No dysuria, frequency, foamy urine, urinary urgency, incontinence or hematuria  NEUROLOGICAL: No numbness or weakness  SKIN: No itching, burning, rashes, or lesions   VASCULAR: No bilateral lower extremity edema.   All other review of systems is negative unless indicated above.    VITALS:  T(F): 98 (10-25-23 @ 13:15), Max: 98 (10-25-23 @ 13:15)  HR: 101 (10-25-23 @ 13:15)  BP: 127/76 (10-25-23 @ 13:15)  RR: 19 (10-25-23 @ 13:15)  SpO2: 98% (10-25-23 @ 13:15)  Wt(kg): --      Weight (kg): 49 (10-25 @ 07:21)  PHYSICAL EXAM:  Constitutional: NAD  HEENT: anicteric sclera, oropharynx clear, MMM  Neck: No JVD  Respiratory: CTAB, no wheezes, rales or rhonchi  Cardiovascular: S1, S2, RRR  Gastrointestinal: BS+, soft, NT/ND  Extremities: No cyanosis or clubbing. No peripheral edema  Neurological: A/O x 3, no focal deficits  Psychiatric: Normal mood, normal affect  : No CVA tenderness. No rai.   Skin: No rashes  IJ PC+    LABS:  10-25    140  |  99  |  12  ----------------------------<  236<H>  4.0   |  23  |  5.90<H>    Ca    9.5      25 Oct 2023 08:14  Phos  5.5     10-25  Mg     2.2     10-25    TPro  7.0  /  Alb  3.7  /  TBili  0.4  /  DBili      /  AST  30  /  ALT  14  /  AlkPhos  59  10-25    Creatinine Trend: 5.90 <--                        11.3   4.88  )-----------( 265      ( 25 Oct 2023 08:14 )             34.8     Urine Studies:  Urinalysis Basic - ( 25 Oct 2023 08:14 )    Color:  / Appearance:  / SG:  / pH:   Gluc: 236 mg/dL / Ketone:   / Bili:  / Urobili:    Blood:  / Protein:  / Nitrite:    Leuk Esterase:  / RBC:  / WBC    Sq Epi:  / Non Sq Epi:  / Bacteria:         RADIOLOGY & ADDITIONAL STUDIES:

## 2023-10-25 NOTE — ED ADULT NURSE NOTE - NSFALLRISKINTERV_ED_ALL_ED

## 2023-10-25 NOTE — H&P ADULT - PROBLEM SELECTOR PLAN 1
P/w lightheadedness in setting of diarrhea earlier this week and poor PO intake. +Orthostatics in ED   -Diarrhea now resolved. Encourage PO intake.   -C/w IVF, compression stockings  -Hold hydralazine, diuretics for now   -F/u repeat orthostatics    -PT eval

## 2023-10-25 NOTE — H&P ADULT - HISTORY OF PRESENT ILLNESS
52 y/o F w/ PMHx of ESRD on HD M/W/F, T2DM, HTN, sarcoidosis presents with lightheadedness/dizziness.   Patient reports she was in her usual state of health until this weekend when she started having diarrhea. Reports having over 5 episodes of loose stool per day. No abd pain, n/v. Also admits to poor PO intake since then. Diarrhea has now resolved. This morning she woke up and went to her usual HD session and starting feeling unsteady on her feet due to lightheadedness and dizziness. She denies any falls, syncopal episodes, CP, SOB, palpitations, abd pain, dysuria, fevers, chills.     In the ED, afebrile, , 129/76, 99% on RA. Positive orthostatics in ED. Given NS 500cc bolus. Admitted to medicine for further management.

## 2023-10-25 NOTE — ED PROVIDER NOTE - PHYSICAL EXAMINATION
CONSTITUTIONAL: In no apparent distress.  ENT: Airway patent, moist mucous membranes.   EYES: Pupils equal, round and reactive to light. EOMI. Conjunctiva normal appearing.   CARDIAC: Normal rate, regular rhythm.  Heart sounds S1, S2.    RESPIRATORY: Breath sounds clear and equal bilaterally.   CHEST: R chest permacath in place.   GASTROINTESTINAL: Abdomen soft, non-tender, not distended.  MUSCULOSKELETAL: Spine appears normal.  NEUROLOGICAL: Alert and oriented x3, no focal deficits, no motor or sensory deficits. 5/5 muscle strength throughout. Non focal neuro exam.   SKIN: Skin normal color, warm, dry and intact.   PSYCHIATRIC: Normal mood and affect.

## 2023-10-25 NOTE — CONSULT NOTE ADULT - ASSESSMENT
54 y/o F w/ PMHx of ESRD on HD M/W/F, T2DM, HTN, sarcoidosis presents with lightheadedness/dizziness.       1) ESRD on HD-  [facility] HD  Acces: PC  consent in chart   Plan for HD today  2k bath will not perform UF   d/c IVF  trend bmp        2) ANemia in ckd  hgb above goal  monitor    Dr Lewis  [phone]

## 2023-10-25 NOTE — PHYSICAL THERAPY INITIAL EVALUATION ADULT - GAIT TRAINING, PT EVAL
Requesting refill of Sumatriptan    LOV 7/7/21  Next OV not scheduled    Last rx'd # 9 on 9/7/21    Ok to refill?   Patient will independently ambulate 300 ft with appropriate AD within 2 weeks

## 2023-10-25 NOTE — H&P ADULT - ASSESSMENT
52 y/o F w/ PMHx of ESRD on HD M/W/F, T2DM, HTN, sarcoidosis presents from HD center unable to tolerated HD session due to lightheadedness/dizziness. Found to be orthostatic positive.

## 2023-10-26 ENCOUNTER — APPOINTMENT (OUTPATIENT)
Dept: INTERNAL MEDICINE | Facility: CLINIC | Age: 53
End: 2023-10-26

## 2023-10-26 LAB
A1C WITH ESTIMATED AVERAGE GLUCOSE RESULT: 7 % — HIGH (ref 4–5.6)
A1C WITH ESTIMATED AVERAGE GLUCOSE RESULT: 7 % — HIGH (ref 4–5.6)
ANION GAP SERPL CALC-SCNC: 13 MMOL/L — SIGNIFICANT CHANGE UP (ref 5–17)
ANION GAP SERPL CALC-SCNC: 13 MMOL/L — SIGNIFICANT CHANGE UP (ref 5–17)
BASOPHILS # BLD AUTO: 0.02 K/UL — SIGNIFICANT CHANGE UP (ref 0–0.2)
BASOPHILS # BLD AUTO: 0.02 K/UL — SIGNIFICANT CHANGE UP (ref 0–0.2)
BASOPHILS NFR BLD AUTO: 0.3 % — SIGNIFICANT CHANGE UP (ref 0–2)
BASOPHILS NFR BLD AUTO: 0.3 % — SIGNIFICANT CHANGE UP (ref 0–2)
BUN SERPL-MCNC: 6 MG/DL — LOW (ref 7–23)
BUN SERPL-MCNC: 6 MG/DL — LOW (ref 7–23)
CALCIUM SERPL-MCNC: 8.7 MG/DL — SIGNIFICANT CHANGE UP (ref 8.4–10.5)
CALCIUM SERPL-MCNC: 8.7 MG/DL — SIGNIFICANT CHANGE UP (ref 8.4–10.5)
CHLORIDE SERPL-SCNC: 98 MMOL/L — SIGNIFICANT CHANGE UP (ref 96–108)
CHLORIDE SERPL-SCNC: 98 MMOL/L — SIGNIFICANT CHANGE UP (ref 96–108)
CO2 SERPL-SCNC: 26 MMOL/L — SIGNIFICANT CHANGE UP (ref 22–31)
CO2 SERPL-SCNC: 26 MMOL/L — SIGNIFICANT CHANGE UP (ref 22–31)
CREAT SERPL-MCNC: 2.84 MG/DL — HIGH (ref 0.5–1.3)
CREAT SERPL-MCNC: 2.84 MG/DL — HIGH (ref 0.5–1.3)
EGFR: 19 ML/MIN/1.73M2 — LOW
EGFR: 19 ML/MIN/1.73M2 — LOW
EOSINOPHIL # BLD AUTO: 0.01 K/UL — SIGNIFICANT CHANGE UP (ref 0–0.5)
EOSINOPHIL # BLD AUTO: 0.01 K/UL — SIGNIFICANT CHANGE UP (ref 0–0.5)
EOSINOPHIL NFR BLD AUTO: 0.1 % — SIGNIFICANT CHANGE UP (ref 0–6)
EOSINOPHIL NFR BLD AUTO: 0.1 % — SIGNIFICANT CHANGE UP (ref 0–6)
ESTIMATED AVERAGE GLUCOSE: 154 MG/DL — HIGH (ref 68–114)
ESTIMATED AVERAGE GLUCOSE: 154 MG/DL — HIGH (ref 68–114)
GLUCOSE BLDC GLUCOMTR-MCNC: 279 MG/DL — HIGH (ref 70–99)
GLUCOSE BLDC GLUCOMTR-MCNC: 279 MG/DL — HIGH (ref 70–99)
GLUCOSE BLDC GLUCOMTR-MCNC: 307 MG/DL — HIGH (ref 70–99)
GLUCOSE BLDC GLUCOMTR-MCNC: 307 MG/DL — HIGH (ref 70–99)
GLUCOSE BLDC GLUCOMTR-MCNC: 321 MG/DL — HIGH (ref 70–99)
GLUCOSE BLDC GLUCOMTR-MCNC: 321 MG/DL — HIGH (ref 70–99)
GLUCOSE SERPL-MCNC: 299 MG/DL — HIGH (ref 70–99)
GLUCOSE SERPL-MCNC: 299 MG/DL — HIGH (ref 70–99)
HCT VFR BLD CALC: 28.8 % — LOW (ref 34.5–45)
HCT VFR BLD CALC: 28.8 % — LOW (ref 34.5–45)
HGB BLD-MCNC: 9.8 G/DL — LOW (ref 11.5–15.5)
HGB BLD-MCNC: 9.8 G/DL — LOW (ref 11.5–15.5)
IMM GRANULOCYTES NFR BLD AUTO: 0.7 % — SIGNIFICANT CHANGE UP (ref 0–0.9)
IMM GRANULOCYTES NFR BLD AUTO: 0.7 % — SIGNIFICANT CHANGE UP (ref 0–0.9)
LYMPHOCYTES # BLD AUTO: 0.93 K/UL — LOW (ref 1–3.3)
LYMPHOCYTES # BLD AUTO: 0.93 K/UL — LOW (ref 1–3.3)
LYMPHOCYTES # BLD AUTO: 12.3 % — LOW (ref 13–44)
LYMPHOCYTES # BLD AUTO: 12.3 % — LOW (ref 13–44)
MCHC RBC-ENTMCNC: 28.4 PG — SIGNIFICANT CHANGE UP (ref 27–34)
MCHC RBC-ENTMCNC: 28.4 PG — SIGNIFICANT CHANGE UP (ref 27–34)
MCHC RBC-ENTMCNC: 34 GM/DL — SIGNIFICANT CHANGE UP (ref 32–36)
MCHC RBC-ENTMCNC: 34 GM/DL — SIGNIFICANT CHANGE UP (ref 32–36)
MCV RBC AUTO: 83.5 FL — SIGNIFICANT CHANGE UP (ref 80–100)
MCV RBC AUTO: 83.5 FL — SIGNIFICANT CHANGE UP (ref 80–100)
MONOCYTES # BLD AUTO: 0.34 K/UL — SIGNIFICANT CHANGE UP (ref 0–0.9)
MONOCYTES # BLD AUTO: 0.34 K/UL — SIGNIFICANT CHANGE UP (ref 0–0.9)
MONOCYTES NFR BLD AUTO: 4.5 % — SIGNIFICANT CHANGE UP (ref 2–14)
MONOCYTES NFR BLD AUTO: 4.5 % — SIGNIFICANT CHANGE UP (ref 2–14)
NEUTROPHILS # BLD AUTO: 6.2 K/UL — SIGNIFICANT CHANGE UP (ref 1.8–7.4)
NEUTROPHILS # BLD AUTO: 6.2 K/UL — SIGNIFICANT CHANGE UP (ref 1.8–7.4)
NEUTROPHILS NFR BLD AUTO: 82.1 % — HIGH (ref 43–77)
NEUTROPHILS NFR BLD AUTO: 82.1 % — HIGH (ref 43–77)
NRBC # BLD: 0 /100 WBCS — SIGNIFICANT CHANGE UP (ref 0–0)
NRBC # BLD: 0 /100 WBCS — SIGNIFICANT CHANGE UP (ref 0–0)
PLATELET # BLD AUTO: 232 K/UL — SIGNIFICANT CHANGE UP (ref 150–400)
PLATELET # BLD AUTO: 232 K/UL — SIGNIFICANT CHANGE UP (ref 150–400)
POTASSIUM SERPL-MCNC: 3.5 MMOL/L — SIGNIFICANT CHANGE UP (ref 3.5–5.3)
POTASSIUM SERPL-MCNC: 3.5 MMOL/L — SIGNIFICANT CHANGE UP (ref 3.5–5.3)
POTASSIUM SERPL-SCNC: 3.5 MMOL/L — SIGNIFICANT CHANGE UP (ref 3.5–5.3)
POTASSIUM SERPL-SCNC: 3.5 MMOL/L — SIGNIFICANT CHANGE UP (ref 3.5–5.3)
RBC # BLD: 3.45 M/UL — LOW (ref 3.8–5.2)
RBC # BLD: 3.45 M/UL — LOW (ref 3.8–5.2)
RBC # FLD: 14.4 % — SIGNIFICANT CHANGE UP (ref 10.3–14.5)
RBC # FLD: 14.4 % — SIGNIFICANT CHANGE UP (ref 10.3–14.5)
SODIUM SERPL-SCNC: 137 MMOL/L — SIGNIFICANT CHANGE UP (ref 135–145)
SODIUM SERPL-SCNC: 137 MMOL/L — SIGNIFICANT CHANGE UP (ref 135–145)
WBC # BLD: 7.55 K/UL — SIGNIFICANT CHANGE UP (ref 3.8–10.5)
WBC # BLD: 7.55 K/UL — SIGNIFICANT CHANGE UP (ref 3.8–10.5)
WBC # FLD AUTO: 7.55 K/UL — SIGNIFICANT CHANGE UP (ref 3.8–10.5)
WBC # FLD AUTO: 7.55 K/UL — SIGNIFICANT CHANGE UP (ref 3.8–10.5)

## 2023-10-26 PROCEDURE — 99233 SBSQ HOSP IP/OBS HIGH 50: CPT

## 2023-10-26 RX ORDER — INSULIN GLARGINE 100 [IU]/ML
12 INJECTION, SOLUTION SUBCUTANEOUS AT BEDTIME
Refills: 0 | Status: DISCONTINUED | OUTPATIENT
Start: 2023-10-26 | End: 2023-10-27

## 2023-10-26 RX ORDER — ERYTHROPOIETIN 10000 [IU]/ML
4000 INJECTION, SOLUTION INTRAVENOUS; SUBCUTANEOUS
Refills: 0 | Status: DISCONTINUED | OUTPATIENT
Start: 2023-10-26 | End: 2023-10-27

## 2023-10-26 RX ORDER — CHLORHEXIDINE GLUCONATE 213 G/1000ML
1 SOLUTION TOPICAL
Refills: 0 | Status: DISCONTINUED | OUTPATIENT
Start: 2023-10-26 | End: 2023-10-27

## 2023-10-26 RX ORDER — ONDANSETRON 8 MG/1
4 TABLET, FILM COATED ORAL EVERY 8 HOURS
Refills: 0 | Status: DISCONTINUED | OUTPATIENT
Start: 2023-10-26 | End: 2023-10-27

## 2023-10-26 RX ORDER — SODIUM CHLORIDE 9 MG/ML
250 INJECTION INTRAMUSCULAR; INTRAVENOUS; SUBCUTANEOUS ONCE
Refills: 0 | Status: COMPLETED | OUTPATIENT
Start: 2023-10-26 | End: 2023-10-26

## 2023-10-26 RX ADMIN — SEVELAMER CARBONATE 800 MILLIGRAM(S): 2400 POWDER, FOR SUSPENSION ORAL at 21:27

## 2023-10-26 RX ADMIN — Medication 25 MILLIGRAM(S): at 05:49

## 2023-10-26 RX ADMIN — INSULIN GLARGINE 12 UNIT(S): 100 INJECTION, SOLUTION SUBCUTANEOUS at 21:27

## 2023-10-26 RX ADMIN — Medication 650 MILLIGRAM(S): at 14:23

## 2023-10-26 RX ADMIN — Medication 650 MILLIGRAM(S): at 03:08

## 2023-10-26 RX ADMIN — GABAPENTIN 300 MILLIGRAM(S): 400 CAPSULE ORAL at 11:14

## 2023-10-26 RX ADMIN — Medication 650 MILLIGRAM(S): at 21:27

## 2023-10-26 RX ADMIN — AMLODIPINE BESYLATE 10 MILLIGRAM(S): 2.5 TABLET ORAL at 05:49

## 2023-10-26 RX ADMIN — SEVELAMER CARBONATE 800 MILLIGRAM(S): 2400 POWDER, FOR SUSPENSION ORAL at 14:22

## 2023-10-26 RX ADMIN — Medication 1: at 08:50

## 2023-10-26 RX ADMIN — HEPARIN SODIUM 5000 UNIT(S): 5000 INJECTION INTRAVENOUS; SUBCUTANEOUS at 14:23

## 2023-10-26 RX ADMIN — ATORVASTATIN CALCIUM 80 MILLIGRAM(S): 80 TABLET, FILM COATED ORAL at 21:26

## 2023-10-26 RX ADMIN — Medication 650 MILLIGRAM(S): at 05:49

## 2023-10-26 RX ADMIN — ONDANSETRON 4 MILLIGRAM(S): 8 TABLET, FILM COATED ORAL at 22:35

## 2023-10-26 RX ADMIN — Medication 4: at 16:54

## 2023-10-26 RX ADMIN — HEPARIN SODIUM 5000 UNIT(S): 5000 INJECTION INTRAVENOUS; SUBCUTANEOUS at 05:48

## 2023-10-26 RX ADMIN — HEPARIN SODIUM 5000 UNIT(S): 5000 INJECTION INTRAVENOUS; SUBCUTANEOUS at 21:29

## 2023-10-26 RX ADMIN — SEVELAMER CARBONATE 800 MILLIGRAM(S): 2400 POWDER, FOR SUSPENSION ORAL at 05:54

## 2023-10-26 RX ADMIN — Medication 2: at 21:49

## 2023-10-26 RX ADMIN — Medication 650 MILLIGRAM(S): at 03:38

## 2023-10-26 RX ADMIN — SODIUM CHLORIDE 250 MILLILITER(S): 9 INJECTION INTRAMUSCULAR; INTRAVENOUS; SUBCUTANEOUS at 12:31

## 2023-10-26 RX ADMIN — Medication 3: at 11:55

## 2023-10-26 RX ADMIN — Medication 10 MILLIGRAM(S): at 05:49

## 2023-10-26 NOTE — PROGRESS NOTE ADULT - PROBLEM SELECTOR PLAN 3
Advance stage copd  -pt poorly compliant with bipap. Pt is agreeable today to use BiPAP  -VBG CO2 increased from yesterday as well as increased HCO3.   -guarded to poor prognosis  -currently 40% venti-mask on 15L  -Continue prednisone taper  -pulm Dr. Capone following  -COVID PCR performed 11/27 NEGATIVE C/w metoprolol and norvasc for now with holding parameters  Holding home diuretics and hydralazine; resume pending BP trend   Orthostatics as above

## 2023-10-26 NOTE — DIETITIAN INITIAL EVALUATION ADULT - PERTINENT LABORATORY DATA
10-26    137  |  98  |  6<L>  ----------------------------<  299<H>  3.5   |  26  |  2.84<H>    Ca    8.7      26 Oct 2023 05:47  Phos  5.5     10-25  Mg     2.2     10-25    TPro  7.0  /  Alb  3.7  /  TBili  0.4  /  DBili  x   /  AST  30  /  ALT  14  /  AlkPhos  59  10-25    10-26 @ 05:47: Na 137, BUN 6<L>, Cr 2.84<H>, <H>, K+ 3.5, Phos --, Mg --, Alk Phos --, ALT/SGPT --, AST/SGOT --, HbA1c --    POCT Blood Glucose.: 279 mg/dL (10-26-23 @ 11:52)  POCT Blood Glucose.: 172 mg/dL (10-26-23 @ 08:15)  POCT Blood Glucose.: 154 mg/dL (10-25-23 @ 22:51)  POCT Blood Glucose.: 323 mg/dL (10-25-23 @ 16:43)  POCT Blood Glucose.: 279 mg/dL (10-26-23 @ 11:52)  A1C with Estimated Average Glucose Result: 7.0 % (10-26-23 @ 05:46)  A1C with Estimated Average Glucose Result: 9.2 % (09-21-23 @ 05:16)  A1C with Estimated Average Glucose Result: 10.4 % (09-06-23 @ 17:15)

## 2023-10-26 NOTE — DIETITIAN NUTRITION RISK NOTIFICATION - TREATMENT: THE FOLLOWING DIET HAS BEEN RECOMMENDED
Diet, Consistent Carbohydrate Renal w/Evening Snack:   For patients receiving Renal Replacement - No Protein Restr, No Conc K, No Conc Phos, Low Sodium (RENAL) (10-25-23 @ 11:36) [Active]

## 2023-10-26 NOTE — DIETITIAN INITIAL EVALUATION ADULT - ORAL INTAKE PTA/DIET HISTORY
Pt reports reduced appetite and PO intake PTA; reports worsening since last week  Confirmed history DM; reports was checking glu levels PTA reports usual glu PTA ~100s; noted A1C with Estimated Average Glucose Result: 7.0 % (10-26-23 @ 05:46)  A1C with Estimated Average Glucose Result: 9.2 % (09-21-23 @ 05:16)  A1C with Estimated Average Glucose Result: 10.4 % (09-06-23 @ 17:15)  Reports was following renal diet PTA    Pt confirmed No known food allergies / food intolerances.

## 2023-10-26 NOTE — DIETITIAN INITIAL EVALUATION ADULT - OTHER INFO
Dosing wt 108 pounds; reports wt loss reports was ~124 pounds ~2 months ago (13% wt loss)  108% IBW ( pounds)     Home Medications:  albuterol 90 mcg/inh inhalation aerosol: 2 puff(s) inhaled every 4 hours, As Needed (25 Oct 2023 10:05)  amLODIPine 10 mg oral tablet: 1 tab(s) orally once a day (25 Oct 2023 10:05)  bumetanide 2 mg oral tablet: 1 tab(s) orally 2 times a day (25 Oct 2023 10:05)  epoetin suzette: 4,000 unit(s) injectable 3 times a week Administer with HD (25 Oct 2023 10:05)  famotidine 20 mg oral tablet: 1 tab(s) orally once a day (at bedtime) (25 Oct 2023 10:05)  ferrous sulfate 325 mg (65 mg elemental iron) oral delayed release tablet: 1 tab(s) orally once a day (25 Oct 2023 10:05)  metOLazone 5 mg oral tablet: 1 tab(s) orally 2 times a day (25 Oct 2023 10:05)  metoprolol succinate 25 mg oral tablet, extended release: 1 tab(s) orally once a day (25 Oct 2023 10:05)  Neurontin 100 mg oral capsule: 1 cap(s) orally 2 times a day (25 Oct 2023 10:02)  predniSONE 10 mg oral tablet: 1 tab(s) orally once a day (25 Oct 2023 10:05)  Renvela 800 mg oral tablet: 2 tab(s) orally 3 times a day (25 Oct 2023 10:05)  rosuvastatin 40 mg oral tablet: 1 tab(s) orally once a day (25 Oct 2023 10:05)  Trulicity Pen 0.75 mg/0.5 mL subcutaneous solution: 0.75 milligram(s) subcutaneously once a week (Patient reports last dose ~1.5 weeks ago - unsure which day). (25 Oct 2023 10:05)

## 2023-10-26 NOTE — DIETITIAN INITIAL EVALUATION ADULT - PROBLEM SELECTOR PLAN 2
ESRD on HD M/W/F.   HD center: [facility]  Nephrologist: Dr. Doug Stewart     -Renal consulted; HD per renal   -C/w Renvela and sodium bicarb

## 2023-10-26 NOTE — DIETITIAN INITIAL EVALUATION ADULT - PERSON TAUGHT/METHOD
- Provided education on renal diet, education included importance of monitoring intake of foods high in potassium/phosphorous/sodium, review of foods high in these micronutrients as well as alternatives, monitoring fluid intake, and importance of adequate protein intake due to increased demand on HD.   - Provided education on Carbohydrate Consistent diet including sources of carbohydrates, portion sizes, pairing protein with carbohydrates, limiting sugar sweetened beverages in diet and the importance of consistent eating pattern to help optimize glycemic control./verbal instruction/written material/teach back - (Patient repeats in own words)/patient instructed

## 2023-10-26 NOTE — PROGRESS NOTE ADULT - PROBLEM SELECTOR PLAN 4
-Lantus 12u qhs; increase to home insulin regimen pending FS trend  -ISS  Carb consistent diet  -A1C 7  -D/w pharmacy - renal dose - Gabapentin 300mg daily for neuropathic pain

## 2023-10-26 NOTE — PROGRESS NOTE ADULT - PROBLEM SELECTOR PLAN 2
ESRD on HD M/W/F.   HD center: [facility] [place]  Nephrologist: Dr. Doug Stewart     -Renal consulted; HD per renal   -C/w Renvela and sodium bicarb

## 2023-10-26 NOTE — DIETITIAN INITIAL EVALUATION ADULT - LITERATURE/VIDEOS GIVEN
Carbohydrate Counting for People with Diabetes, Diabetes Label Reading Nutrition Tips, Plate Method for Diabetes  Chronic Kidney Disease Stage 3-5 Nutrition Therapy, Phosphorus Content of Food, Potassium Content of Food

## 2023-10-26 NOTE — PROGRESS NOTE ADULT - PROBLEM SELECTOR PLAN 1
P/w lightheadedness in setting of diarrhea earlier this week and poor PO intake. +Orthostatics in ED   -Diarrhea now resolved. Encourage PO intake.   -Compression stockings  -Hold hydralazine, diuretics for now   -Repeat orthostatics improved   -PT eval

## 2023-10-26 NOTE — DIETITIAN INITIAL EVALUATION ADULT - NSFNSGIIOFT_GEN_A_CORE
- Pt denies nausea, vomiting, diarrhea, or constipation at this time   - Last BM:today per pt; not currently ordered for bowel regimen

## 2023-10-26 NOTE — DIETITIAN INITIAL EVALUATION ADULT - ORAL NUTRITION SUPPLEMENTS
- If not medically contraindicated, recommend [employer] Renal Support shake BID  - If not medically contraindicated, recommend Nephro-juan alberto daily

## 2023-10-26 NOTE — DIETITIAN INITIAL EVALUATION ADULT - NS FNS DIET ORDER
Diet, Consistent Carbohydrate Renal w/Evening Snack:   For patients receiving Renal Replacement - No Protein Restr, No Conc K, No Conc Phos, Low Sodium (RENAL) (10-25-23)

## 2023-10-26 NOTE — DIETITIAN INITIAL EVALUATION ADULT - PERTINENT MEDS FT
MEDICATIONS  (STANDING):  amLODIPine   Tablet 10 milliGRAM(s) Oral daily  atorvastatin 80 milliGRAM(s) Oral at bedtime  chlorhexidine 2% Cloths 1 Application(s) Topical <User Schedule>  dextrose 5%. 1000 milliLiter(s) (100 mL/Hr) IV Continuous <Continuous>  dextrose 5%. 1000 milliLiter(s) (50 mL/Hr) IV Continuous <Continuous>  dextrose 50% Injectable 25 Gram(s) IV Push once  dextrose 50% Injectable 25 Gram(s) IV Push once  dextrose 50% Injectable 12.5 Gram(s) IV Push once  epoetin suzette (EPOGEN) Injectable 4000 Unit(s) IV Push <User Schedule>  gabapentin 300 milliGRAM(s) Oral daily  glucagon  Injectable 1 milliGRAM(s) IntraMuscular once  heparin   Injectable 5000 Unit(s) SubCutaneous every 8 hours  influenza   Vaccine 0.5 milliLiter(s) IntraMuscular once  insulin glargine Injectable (LANTUS) 12 Unit(s) SubCutaneous at bedtime  insulin lispro (ADMELOG) corrective regimen sliding scale   SubCutaneous at bedtime  insulin lispro (ADMELOG) corrective regimen sliding scale   SubCutaneous three times a day before meals  metoprolol succinate ER 25 milliGRAM(s) Oral daily  predniSONE   Tablet 10 milliGRAM(s) Oral daily  sevelamer carbonate 800 milliGRAM(s) Oral three times a day  sodium bicarbonate 650 milliGRAM(s) Oral three times a day    MEDICATIONS  (PRN):  acetaminophen     Tablet .. 650 milliGRAM(s) Oral every 6 hours PRN Temp greater or equal to 38C (100.4F), Mild Pain (1 - 3)  dextrose Oral Gel 15 Gram(s) Oral once PRN Blood Glucose LESS THAN 70 milliGRAM(s)/deciliter  melatonin 3 milliGRAM(s) Oral at bedtime PRN Insomnia

## 2023-10-27 ENCOUNTER — TRANSCRIPTION ENCOUNTER (OUTPATIENT)
Age: 53
End: 2023-10-27

## 2023-10-27 VITALS
DIASTOLIC BLOOD PRESSURE: 90 MMHG | HEART RATE: 97 BPM | WEIGHT: 132.28 LBS | RESPIRATION RATE: 18 BRPM | SYSTOLIC BLOOD PRESSURE: 150 MMHG | TEMPERATURE: 97 F | OXYGEN SATURATION: 100 %

## 2023-10-27 LAB
ANION GAP SERPL CALC-SCNC: 11 MMOL/L — SIGNIFICANT CHANGE UP (ref 5–17)
ANION GAP SERPL CALC-SCNC: 11 MMOL/L — SIGNIFICANT CHANGE UP (ref 5–17)
BASOPHILS # BLD AUTO: 0.02 K/UL — SIGNIFICANT CHANGE UP (ref 0–0.2)
BASOPHILS # BLD AUTO: 0.02 K/UL — SIGNIFICANT CHANGE UP (ref 0–0.2)
BASOPHILS NFR BLD AUTO: 0.4 % — SIGNIFICANT CHANGE UP (ref 0–2)
BASOPHILS NFR BLD AUTO: 0.4 % — SIGNIFICANT CHANGE UP (ref 0–2)
BUN SERPL-MCNC: 13 MG/DL — SIGNIFICANT CHANGE UP (ref 7–23)
BUN SERPL-MCNC: 13 MG/DL — SIGNIFICANT CHANGE UP (ref 7–23)
CALCIUM SERPL-MCNC: 9 MG/DL — SIGNIFICANT CHANGE UP (ref 8.4–10.5)
CALCIUM SERPL-MCNC: 9 MG/DL — SIGNIFICANT CHANGE UP (ref 8.4–10.5)
CHLORIDE SERPL-SCNC: 101 MMOL/L — SIGNIFICANT CHANGE UP (ref 96–108)
CHLORIDE SERPL-SCNC: 101 MMOL/L — SIGNIFICANT CHANGE UP (ref 96–108)
CO2 SERPL-SCNC: 28 MMOL/L — SIGNIFICANT CHANGE UP (ref 22–31)
CO2 SERPL-SCNC: 28 MMOL/L — SIGNIFICANT CHANGE UP (ref 22–31)
CREAT SERPL-MCNC: 4.61 MG/DL — HIGH (ref 0.5–1.3)
CREAT SERPL-MCNC: 4.61 MG/DL — HIGH (ref 0.5–1.3)
EGFR: 11 ML/MIN/1.73M2 — LOW
EGFR: 11 ML/MIN/1.73M2 — LOW
EOSINOPHIL # BLD AUTO: 0.22 K/UL — SIGNIFICANT CHANGE UP (ref 0–0.5)
EOSINOPHIL # BLD AUTO: 0.22 K/UL — SIGNIFICANT CHANGE UP (ref 0–0.5)
EOSINOPHIL NFR BLD AUTO: 4.6 % — SIGNIFICANT CHANGE UP (ref 0–6)
EOSINOPHIL NFR BLD AUTO: 4.6 % — SIGNIFICANT CHANGE UP (ref 0–6)
GLUCOSE BLDC GLUCOMTR-MCNC: 187 MG/DL — HIGH (ref 70–99)
GLUCOSE BLDC GLUCOMTR-MCNC: 187 MG/DL — HIGH (ref 70–99)
GLUCOSE BLDC GLUCOMTR-MCNC: 326 MG/DL — HIGH (ref 70–99)
GLUCOSE BLDC GLUCOMTR-MCNC: 326 MG/DL — HIGH (ref 70–99)
GLUCOSE SERPL-MCNC: 120 MG/DL — HIGH (ref 70–99)
GLUCOSE SERPL-MCNC: 120 MG/DL — HIGH (ref 70–99)
HCT VFR BLD CALC: 28.6 % — LOW (ref 34.5–45)
HCT VFR BLD CALC: 28.6 % — LOW (ref 34.5–45)
HGB BLD-MCNC: 9.6 G/DL — LOW (ref 11.5–15.5)
HGB BLD-MCNC: 9.6 G/DL — LOW (ref 11.5–15.5)
IMM GRANULOCYTES NFR BLD AUTO: 1.2 % — HIGH (ref 0–0.9)
IMM GRANULOCYTES NFR BLD AUTO: 1.2 % — HIGH (ref 0–0.9)
LYMPHOCYTES # BLD AUTO: 1.81 K/UL — SIGNIFICANT CHANGE UP (ref 1–3.3)
LYMPHOCYTES # BLD AUTO: 1.81 K/UL — SIGNIFICANT CHANGE UP (ref 1–3.3)
LYMPHOCYTES # BLD AUTO: 37.6 % — SIGNIFICANT CHANGE UP (ref 13–44)
LYMPHOCYTES # BLD AUTO: 37.6 % — SIGNIFICANT CHANGE UP (ref 13–44)
MCHC RBC-ENTMCNC: 28.4 PG — SIGNIFICANT CHANGE UP (ref 27–34)
MCHC RBC-ENTMCNC: 28.4 PG — SIGNIFICANT CHANGE UP (ref 27–34)
MCHC RBC-ENTMCNC: 33.6 GM/DL — SIGNIFICANT CHANGE UP (ref 32–36)
MCHC RBC-ENTMCNC: 33.6 GM/DL — SIGNIFICANT CHANGE UP (ref 32–36)
MCV RBC AUTO: 84.6 FL — SIGNIFICANT CHANGE UP (ref 80–100)
MCV RBC AUTO: 84.6 FL — SIGNIFICANT CHANGE UP (ref 80–100)
MONOCYTES # BLD AUTO: 0.32 K/UL — SIGNIFICANT CHANGE UP (ref 0–0.9)
MONOCYTES # BLD AUTO: 0.32 K/UL — SIGNIFICANT CHANGE UP (ref 0–0.9)
MONOCYTES NFR BLD AUTO: 6.7 % — SIGNIFICANT CHANGE UP (ref 2–14)
MONOCYTES NFR BLD AUTO: 6.7 % — SIGNIFICANT CHANGE UP (ref 2–14)
NEUTROPHILS # BLD AUTO: 2.38 K/UL — SIGNIFICANT CHANGE UP (ref 1.8–7.4)
NEUTROPHILS # BLD AUTO: 2.38 K/UL — SIGNIFICANT CHANGE UP (ref 1.8–7.4)
NEUTROPHILS NFR BLD AUTO: 49.5 % — SIGNIFICANT CHANGE UP (ref 43–77)
NEUTROPHILS NFR BLD AUTO: 49.5 % — SIGNIFICANT CHANGE UP (ref 43–77)
NRBC # BLD: 0 /100 WBCS — SIGNIFICANT CHANGE UP (ref 0–0)
NRBC # BLD: 0 /100 WBCS — SIGNIFICANT CHANGE UP (ref 0–0)
PLATELET # BLD AUTO: 233 K/UL — SIGNIFICANT CHANGE UP (ref 150–400)
PLATELET # BLD AUTO: 233 K/UL — SIGNIFICANT CHANGE UP (ref 150–400)
POTASSIUM SERPL-MCNC: 3.4 MMOL/L — LOW (ref 3.5–5.3)
POTASSIUM SERPL-MCNC: 3.4 MMOL/L — LOW (ref 3.5–5.3)
POTASSIUM SERPL-SCNC: 3.4 MMOL/L — LOW (ref 3.5–5.3)
POTASSIUM SERPL-SCNC: 3.4 MMOL/L — LOW (ref 3.5–5.3)
RBC # BLD: 3.38 M/UL — LOW (ref 3.8–5.2)
RBC # BLD: 3.38 M/UL — LOW (ref 3.8–5.2)
RBC # FLD: 14.3 % — SIGNIFICANT CHANGE UP (ref 10.3–14.5)
RBC # FLD: 14.3 % — SIGNIFICANT CHANGE UP (ref 10.3–14.5)
SODIUM SERPL-SCNC: 140 MMOL/L — SIGNIFICANT CHANGE UP (ref 135–145)
SODIUM SERPL-SCNC: 140 MMOL/L — SIGNIFICANT CHANGE UP (ref 135–145)
WBC # BLD: 4.81 K/UL — SIGNIFICANT CHANGE UP (ref 3.8–10.5)
WBC # BLD: 4.81 K/UL — SIGNIFICANT CHANGE UP (ref 3.8–10.5)
WBC # FLD AUTO: 4.81 K/UL — SIGNIFICANT CHANGE UP (ref 3.8–10.5)
WBC # FLD AUTO: 4.81 K/UL — SIGNIFICANT CHANGE UP (ref 3.8–10.5)

## 2023-10-27 PROCEDURE — 99239 HOSP IP/OBS DSCHRG MGMT >30: CPT

## 2023-10-27 RX ORDER — METOLAZONE 5 MG/1
1 TABLET ORAL
Refills: 0 | DISCHARGE

## 2023-10-27 RX ORDER — FAMOTIDINE 10 MG/ML
1 INJECTION INTRAVENOUS
Refills: 0 | DISCHARGE

## 2023-10-27 RX ORDER — POTASSIUM CHLORIDE 20 MEQ
20 PACKET (EA) ORAL ONCE
Refills: 0 | Status: COMPLETED | OUTPATIENT
Start: 2023-10-27 | End: 2023-10-27

## 2023-10-27 RX ORDER — GABAPENTIN 400 MG/1
1 CAPSULE ORAL
Qty: 30 | Refills: 0
Start: 2023-10-27 | End: 2023-11-25

## 2023-10-27 RX ORDER — BUMETANIDE 0.25 MG/ML
1 INJECTION INTRAMUSCULAR; INTRAVENOUS
Refills: 0 | DISCHARGE

## 2023-10-27 RX ADMIN — Medication 650 MILLIGRAM(S): at 14:58

## 2023-10-27 RX ADMIN — SEVELAMER CARBONATE 800 MILLIGRAM(S): 2400 POWDER, FOR SUSPENSION ORAL at 14:58

## 2023-10-27 RX ADMIN — CHLORHEXIDINE GLUCONATE 1 APPLICATION(S): 213 SOLUTION TOPICAL at 05:34

## 2023-10-27 RX ADMIN — Medication 650 MILLIGRAM(S): at 08:26

## 2023-10-27 RX ADMIN — GABAPENTIN 300 MILLIGRAM(S): 400 CAPSULE ORAL at 12:22

## 2023-10-27 RX ADMIN — AMLODIPINE BESYLATE 10 MILLIGRAM(S): 2.5 TABLET ORAL at 05:30

## 2023-10-27 RX ADMIN — Medication 25 MILLIGRAM(S): at 05:30

## 2023-10-27 RX ADMIN — Medication 650 MILLIGRAM(S): at 11:58

## 2023-10-27 RX ADMIN — Medication 4: at 12:22

## 2023-10-27 RX ADMIN — Medication 10 MILLIGRAM(S): at 05:31

## 2023-10-27 RX ADMIN — Medication 1: at 08:27

## 2023-10-27 RX ADMIN — Medication 20 MILLIEQUIVALENT(S): at 08:26

## 2023-10-27 RX ADMIN — ERYTHROPOIETIN 4000 UNIT(S): 10000 INJECTION, SOLUTION INTRAVENOUS; SUBCUTANEOUS at 18:01

## 2023-10-27 RX ADMIN — Medication 650 MILLIGRAM(S): at 18:10

## 2023-10-27 RX ADMIN — HEPARIN SODIUM 5000 UNIT(S): 5000 INJECTION INTRAVENOUS; SUBCUTANEOUS at 05:31

## 2023-10-27 RX ADMIN — Medication 650 MILLIGRAM(S): at 05:30

## 2023-10-27 RX ADMIN — SEVELAMER CARBONATE 800 MILLIGRAM(S): 2400 POWDER, FOR SUSPENSION ORAL at 05:31

## 2023-10-27 NOTE — DISCHARGE NOTE NURSING/CASE MANAGEMENT/SOCIAL WORK - NSDCFUADDAPPT_GEN_ALL_CORE_FT
APPTS ARE READY TO BE MADE: [X] YES    Best Family or Patient Contact (if needed):    Additional Information about above appointments (if needed):    1: [phone] within 1 week of discharge   2: Nephrology  3:     Other comments or requests:

## 2023-10-27 NOTE — DISCHARGE NOTE PROVIDER - NSDCFUSCHEDAPPT_GEN_ALL_CORE_FT
Rosie Fagan  [facility]  ENDOCRIN [address] [facility]  Scheduled Appointment: 11/28/2023    [facility]  INTMED  [facility]   Scheduled Appointment: 11/30/2023

## 2023-10-27 NOTE — DISCHARGE NOTE PROVIDER - NSDCMRMEDTOKEN_GEN_ALL_CORE_FT
albuterol 90 mcg/inh inhalation aerosol: 2 puff(s) inhaled every 4 hours, As Needed  amLODIPine 10 mg oral tablet: 1 tab(s) orally once a day  epoetin suzette: 4,000 unit(s) injectable 3 times a week Administer with HD  ferrous sulfate 325 mg (65 mg elemental iron) oral delayed release tablet: 1 tab(s) orally once a day  insulin glargine 100 units/mL subcutaneous solution: 16 unit(s) subcutaneous once a day (at bedtime)  lactobacillus acidophilus oral capsule: 1 tab(s) orally once a day  metoprolol succinate 25 mg oral tablet, extended release: 1 tab(s) orally once a day  NovoLOG 100 units/mL injectable solution: 10 unit(s) injectable 3 times a day  predniSONE 10 mg oral tablet: 1 tab(s) orally once a day  Renvela 800 mg oral tablet: 2 tab(s) orally 3 times a day  rosuvastatin 40 mg oral tablet: 1 tab(s) orally once a day  sodium bicarbonate 650 mg oral tablet: 1 tab(s) orally 3 times a day  Trulicity Pen 0.75 mg/0.5 mL subcutaneous solution: 0.75 milligram(s) subcutaneously once a week (Patient reports last dose ~1.5 weeks ago - unsure which day).  Vitamin D2 1.25 mg (50,000 intl units) oral capsule: 1 cap(s) orally once a week   albuterol 90 mcg/inh inhalation aerosol: 2 puff(s) inhaled every 4 hours, As Needed  amLODIPine 10 mg oral tablet: 1 tab(s) orally once a day  epoetin suzette: 4,000 unit(s) injectable 3 times a week Administer with HD  ferrous sulfate 325 mg (65 mg elemental iron) oral delayed release tablet: 1 tab(s) orally once a day  gabapentin 300 mg oral capsule: 1 cap(s) orally once a day  insulin glargine 100 units/mL subcutaneous solution: 16 unit(s) subcutaneous once a day (at bedtime)  lactobacillus acidophilus oral capsule: 1 tab(s) orally once a day  metoprolol succinate 25 mg oral tablet, extended release: 1 tab(s) orally once a day  NovoLOG 100 units/mL injectable solution: 10 unit(s) injectable 3 times a day  predniSONE 10 mg oral tablet: 1 tab(s) orally once a day  Renvela 800 mg oral tablet: 2 tab(s) orally 3 times a day  rosuvastatin 40 mg oral tablet: 1 tab(s) orally once a day  sodium bicarbonate 650 mg oral tablet: 1 tab(s) orally 3 times a day  Trulicity Pen 0.75 mg/0.5 mL subcutaneous solution: 0.75 milligram(s) subcutaneously once a week (Patient reports last dose ~1.5 weeks ago - unsure which day).  Vitamin D2 1.25 mg (50,000 intl units) oral capsule: 1 cap(s) orally once a week

## 2023-10-27 NOTE — DISCHARGE NOTE PROVIDER - NSDCFUADDAPPT_GEN_ALL_CORE_FT
APPTS ARE READY TO BE MADE: [ ] YES    Best Family or Patient Contact (if needed):    Additional Information about above appointments (if needed):    1: [phone]  2: Nephrology  3:     Other comments or requests:    APPTS ARE READY TO BE MADE: [X] YES    Best Family or Patient Contact (if needed):    Additional Information about above appointments (if needed):    1: [phone]  2: Nephrology  3:     Other comments or requests:    APPTS ARE READY TO BE MADE: [X] YES    Best Family or Patient Contact (if needed):    Additional Information about above appointments (if needed):    1: [phone] within 1 week of discharge   2: Nephrology  3:     Other comments or requests:    APPTS ARE READY TO BE MADE: [X] YES    Best Family or Patient Contact (if needed):    Additional Information about above appointments (if needed):    1: [phone] within 1 week of discharge   2: Nephrology  3:     Other comments or requests:       Outreached on 11/3, 11/6, and 11/7 which have been unsuccessful. Will await call back from patient/caregiver to coordinate follow up care.

## 2023-10-27 NOTE — DISCHARGE NOTE PROVIDER - HOSPITAL COURSE
HPI:  52 y/o F w/ PMHx of ESRD on HD M/W/F, T2DM, HTN, sarcoidosis presents with lightheadedness/dizziness.   Patient reports she was in her usual state of health until this weekend when she started having diarrhea. Reports having over 5 episodes of loose stool per day. No abd pain, n/v. Also admits to poor PO intake since then. Diarrhea has now resolved. This morning she woke up and went to her usual HD session and starting feeling unsteady on her feet due to lightheadedness and dizziness. She denies any falls, syncopal episodes, CP, SOB, palpitations, abd pain, dysuria, fevers, chills.     In the ED, afebrile, , 129/76, 99% on RA. Positive orthostatics in ED. Given NS 500cc bolus. Admitted to medicine for further management. (25 Oct 2023 11:58)    Hospital Course:  Patient P/w lightheadedness in setting of diarrhea earlier this week and poor PO intake. +Orthostatics in ED , found to be orthostatic positive, hydralazine and diuretics held, treated with IVF and compression stockings.  ESRD patient- HD on discharge   Sarcoidosis- on prednisone    Patient medically cleared for discharge by Dr. Coronado on 10/27/23 with outpatient PCP and nephrology follow up.     Important Medication Changes and Reason:  Hydralazine and diuretics held due to orthostatic hypotension   Gabapentin decreased for ESRD    Active or Pending Issues Requiring Follow-up:  PCP  Nephrology- Dr. Doug Stewart     Advanced Directives:   [X ] Full code  [ ] DNR  [ ] Hospice    Discharge Diagnoses:  ESRD  Sarcoidosis  Orthostatic hypotension         HPI:  54 y/o F w/ PMHx of ESRD on HD M/W/F, T2DM, HTN, sarcoidosis presents with lightheadedness/dizziness.   Patient reports she was in her usual state of health until this weekend when she started having diarrhea. Reports having over 5 episodes of loose stool per day. No abd pain, n/v. Also admits to poor PO intake since then. Diarrhea has now resolved. This morning she woke up and went to her usual HD session and starting feeling unsteady on her feet due to lightheadedness and dizziness. She denies any falls, syncopal episodes, CP, SOB, palpitations, abd pain, dysuria, fevers, chills.     In the ED, afebrile, , 129/76, 99% on RA. Positive orthostatics in ED. Given NS 500cc bolus. Admitted to medicine for further management. (25 Oct 2023 11:58)    Hospital Course:  Patient P/w lightheadedness in setting of diarrhea earlier this week and poor PO intake. +Orthostatics in ED , found to be orthostatic positive, hydralazine and diuretics held, treated with IVF and compression stockings. Orthostatics improved. Currently asymptomatic.   ESRD patient- HD on discharge   Sarcoidosis- on prednisone    Patient medically optimized for discharge with outpatient PCP and nephrology follow up.     Important Medication Changes and Reason:  Hydralazine and diuretics held due to orthostatic hypotension   Gabapentin decreased for ESRD    Active or Pending Issues Requiring Follow-up:  PCP  Nephrology- Dr. Doug Stewart     Advanced Directives:   [X ] Full code  [ ] DNR  [ ] Hospice    Discharge Diagnoses:  ESRD  Sarcoidosis  Orthostatic hypotension

## 2023-10-27 NOTE — PROGRESS NOTE ADULT - SUBJECTIVE AND OBJECTIVE BOX
[facility] : [facility] [phone], Office [phone]  Dr. Stewart/Dr Garcia/Dr Chandra  /Dr Balbir pal /Dr LOGAN Lewis/Dr Graeme Gale/Dr Balbir Das /Dr SHAW Estrada  _______________________________________________________________________________________________    Pt seen and examined earlier today. Denied any acute complaints. Denied chest pain, shortness of breath, nausea, vomiting, or abdominal pain. No acute issues noted overnight  States her diarrhea has improved and no longer feeling lightheaded or dizzy     VITALS:  T(F): 98.3 (10-26 @ 12:06), Max: 98.6 (10-26 @ 04:32)  HR: 80 (10-26 @ 12:06)  BP: 127/73 (10-26 @ 12:06)  ABP: --  RR: 18 (10-26 @ 12:06)  SpO2: 100% (10-26 @ 12:06)    10-25 @ 07:01  -  10-26 @ 07:00  --------------------------------------------------------  IN: 340 mL / OUT: 0 mL / NET: 340 mL      Physical Exam :-  Constitutional: NAD  Respiratory: Bilateral equal breath sounds, no Crackles present.  Cardiovascular: S1, S2 normal, positive Murmur  Gastrointestinal: Bowel Sounds present, soft  Extremities: no Edema Feet  Neurological: Alert and Oriented x 3  Psychiatric: Normal mood, normal affect    Data:-  Allergies :   No Known Allergies    Hospital Medications:   MEDICATIONS  (STANDING):  amLODIPine   Tablet 10 milliGRAM(s) Oral daily  atorvastatin 80 milliGRAM(s) Oral at bedtime  chlorhexidine 2% Cloths 1 Application(s) Topical <User Schedule>  dextrose 5%. 1000 milliLiter(s) (100 mL/Hr) IV Continuous <Continuous>  dextrose 5%. 1000 milliLiter(s) (50 mL/Hr) IV Continuous <Continuous>  dextrose 50% Injectable 25 Gram(s) IV Push once  dextrose 50% Injectable 25 Gram(s) IV Push once  dextrose 50% Injectable 12.5 Gram(s) IV Push once  gabapentin 300 milliGRAM(s) Oral daily  glucagon  Injectable 1 milliGRAM(s) IntraMuscular once  heparin   Injectable 5000 Unit(s) SubCutaneous every 8 hours  influenza   Vaccine 0.5 milliLiter(s) IntraMuscular once  insulin glargine Injectable (LANTUS) 12 Unit(s) SubCutaneous at bedtime  insulin lispro (ADMELOG) corrective regimen sliding scale   SubCutaneous three times a day before meals  insulin lispro (ADMELOG) corrective regimen sliding scale   SubCutaneous at bedtime  metoprolol succinate ER 25 milliGRAM(s) Oral daily  predniSONE   Tablet 10 milliGRAM(s) Oral daily  sevelamer carbonate 800 milliGRAM(s) Oral three times a day  sodium bicarbonate 650 milliGRAM(s) Oral three times a day    10-26    137  |  98  |  6<L>  ----------------------------<  299<H>  3.5   |  26  |  2.84<H>    Ca    8.7      26 Oct 2023 05:47  Phos  5.5     10-25  Mg     2.2     10-25    TPro  7.0  /  Alb  3.7  /  TBili  0.4  /  DBili      /  AST  30  /  ALT  14  /  AlkPhos  59  10-25    Creatinine Trend: 2.84 <--, 5.90 <--  egfr trend : 19 <--, 8 <--                        9.8    7.55  )-----------( 232      ( 26 Oct 2023 05:46 )             28.8   
[facility] : [facility] [phone], Office [phone]  Dr. Stewart/Dr Garcia/Dr Chandra  /Dr Balbir pal /Dr LOGAN Lewis/Dr Graeme Gale/Dr Balbir Das /Dr SHAW Estrada  _______________________________________________________________________________________________    Pt seen and examined earlier today. Denied any acute complaints. Denied chest pain, shortness of breath, nausea, vomiting, or abdominal pain. No acute issues noted overnight    VITALS:  T(F): 98.7 (10-27 @ 04:49), Max: 99.7 (10-26 @ 20:48)  HR: 77 (10-27 @ 04:49)  BP: 126/74 (10-27 @ 04:49)  ABP: --  RR: 18 (10-27 @ 04:49)  SpO2: 98% (10-27 @ 04:49)    10-26 @ 07:01  -  10-27 @ 07:00  --------------------------------------------------------  IN: 480 mL / OUT: 0 mL / NET: 480 mL      Physical Exam :-  Constitutional: NAD  Respiratory: Bilateral equal breath sounds, no Crackles present.  Cardiovascular: S1, S2 normal, positive Murmur  Gastrointestinal: Bowel Sounds present, soft  Extremities: no Edema Feet  Neurological: Alert and Oriented x 3  Psychiatric: Normal mood, normal affect    Data:-  Allergies :   No Known Allergies    Hospital Medications:   MEDICATIONS  (STANDING):  amLODIPine   Tablet 10 milliGRAM(s) Oral daily  atorvastatin 80 milliGRAM(s) Oral at bedtime  chlorhexidine 2% Cloths 1 Application(s) Topical <User Schedule>  dextrose 5%. 1000 milliLiter(s) (100 mL/Hr) IV Continuous <Continuous>  dextrose 5%. 1000 milliLiter(s) (50 mL/Hr) IV Continuous <Continuous>  dextrose 50% Injectable 25 Gram(s) IV Push once  dextrose 50% Injectable 12.5 Gram(s) IV Push once  dextrose 50% Injectable 25 Gram(s) IV Push once  epoetin suzette (EPOGEN) Injectable 4000 Unit(s) IV Push <User Schedule>  gabapentin 300 milliGRAM(s) Oral daily  glucagon  Injectable 1 milliGRAM(s) IntraMuscular once  heparin   Injectable 5000 Unit(s) SubCutaneous every 8 hours  influenza   Vaccine 0.5 milliLiter(s) IntraMuscular once  insulin glargine Injectable (LANTUS) 12 Unit(s) SubCutaneous at bedtime  insulin lispro (ADMELOG) corrective regimen sliding scale   SubCutaneous at bedtime  insulin lispro (ADMELOG) corrective regimen sliding scale   SubCutaneous three times a day before meals  metoprolol succinate ER 25 milliGRAM(s) Oral daily  predniSONE   Tablet 10 milliGRAM(s) Oral daily  sevelamer carbonate 800 milliGRAM(s) Oral three times a day  sodium bicarbonate 650 milliGRAM(s) Oral three times a day    10-27    140  |  101  |  13  ----------------------------<  120<H>  3.4<L>   |  28  |  4.61<H>    Ca    9.0      27 Oct 2023 06:35      Creatinine Trend: 4.61 <--, 2.84 <--, 5.90 <--  egfr trend : 11 <--, 19 <--, 8 <--                        9.6    4.81  )-----------( 233      ( 27 Oct 2023 06:35 )             28.6   
SUBJECTIVE / OVERNIGHT EVENTS:  Pt seen and examined this morning. No lightheadedness/dizziness this morning. Tolerated HD well yesterday  Denies CP, SOB, n/v, abd pain, diarrhea, dysuria.     MEDICATIONS  (STANDING):  amLODIPine   Tablet 10 milliGRAM(s) Oral daily  atorvastatin 80 milliGRAM(s) Oral at bedtime  chlorhexidine 2% Cloths 1 Application(s) Topical <User Schedule>  dextrose 5%. 1000 milliLiter(s) (50 mL/Hr) IV Continuous <Continuous>  dextrose 5%. 1000 milliLiter(s) (100 mL/Hr) IV Continuous <Continuous>  dextrose 50% Injectable 25 Gram(s) IV Push once  dextrose 50% Injectable 25 Gram(s) IV Push once  dextrose 50% Injectable 12.5 Gram(s) IV Push once  gabapentin 300 milliGRAM(s) Oral daily  glucagon  Injectable 1 milliGRAM(s) IntraMuscular once  heparin   Injectable 5000 Unit(s) SubCutaneous every 8 hours  influenza   Vaccine 0.5 milliLiter(s) IntraMuscular once  insulin glargine Injectable (LANTUS) 10 Unit(s) SubCutaneous at bedtime  insulin lispro (ADMELOG) corrective regimen sliding scale   SubCutaneous at bedtime  insulin lispro (ADMELOG) corrective regimen sliding scale   SubCutaneous three times a day before meals  metoprolol succinate ER 25 milliGRAM(s) Oral daily  predniSONE   Tablet 10 milliGRAM(s) Oral daily  sevelamer carbonate 800 milliGRAM(s) Oral three times a day  sodium bicarbonate 650 milliGRAM(s) Oral three times a day  sodium chloride 0.9% Bolus 250 milliLiter(s) IV Bolus once    MEDICATIONS  (PRN):  acetaminophen     Tablet .. 650 milliGRAM(s) Oral every 6 hours PRN Temp greater or equal to 38C (100.4F), Mild Pain (1 - 3)  dextrose Oral Gel 15 Gram(s) Oral once PRN Blood Glucose LESS THAN 70 milliGRAM(s)/deciliter  melatonin 3 milliGRAM(s) Oral at bedtime PRN Insomnia      I&O's Summary    25 Oct 2023 07:01  -  26 Oct 2023 07:00  --------------------------------------------------------  IN: 340 mL / OUT: 0 mL / NET: 340 mL        PHYSICAL EXAM:  Vital Signs Last 24 Hrs  T(C): 37 (26 Oct 2023 04:32), Max: 37 (26 Oct 2023 04:32)  T(F): 98.6 (26 Oct 2023 04:32), Max: 98.6 (26 Oct 2023 04:32)  HR: 97 (26 Oct 2023 05:40) (97 - 103)  BP: 158/80 (26 Oct 2023 05:40) (127/76 - 158/80)  BP(mean): 106 (26 Oct 2023 05:40) (93 - 106)  RR: 16 (26 Oct 2023 04:32) (16 - 19)  SpO2: 100% (26 Oct 2023 11:06) (98% - 100%)    Parameters below as of 26 Oct 2023 11:06  Patient On (Oxygen Delivery Method): room air      CONSTITUTIONAL: Well-groomed, in no apparent distress  EYES: No conjunctival or scleral injection, non-icteric; EOMI  ENMT: No external nasal lesions; no pharyngeal injection or exudates, oral mucosa with moist membranes  NECK: Supple; Trachea midline   RESPIRATORY: Breathing comfortably; lungs CTA without wheeze  CARDIOVASCULAR: +S1S2, RRR; no lower extremity edema  GASTROINTESTINAL: No tenderness, +BS throughout, no rebound/guarding  MUSCULOSKELETAL: normal strength and tone of extremities  NEUROLOGIC: grossly nonfocal; sensation intact in LEs b/l to light touch  PSYCHIATRIC: A+O x 3; mood and affect appropriate    LABS:                        9.8    7.55  )-----------( 232      ( 26 Oct 2023 05:46 )             28.8     10-26    137  |  98  |  6<L>  ----------------------------<  299<H>  3.5   |  26  |  2.84<H>    Ca    8.7      26 Oct 2023 05:47  Phos  5.5     10-25  Mg     2.2     10-25    TPro  7.0  /  Alb  3.7  /  TBili  0.4  /  DBili  x   /  AST  30  /  ALT  14  /  AlkPhos  59  10-25          Urinalysis Basic - ( 26 Oct 2023 05:47 )    Color: x / Appearance: x / SG: x / pH: x  Gluc: 299 mg/dL / Ketone: x  / Bili: x / Urobili: x   Blood: x / Protein: x / Nitrite: x   Leuk Esterase: x / RBC: x / WBC x   Sq Epi: x / Non Sq Epi: x / Bacteria: x        SARS-CoV-2: NotDetec (19 Sep 2023 22:45)

## 2023-10-27 NOTE — DISCHARGE NOTE PROVIDER - DETAILS OF MALNUTRITION DIAGNOSIS/DIAGNOSES
This patient has been assessed with a concern for Malnutrition and was treated during this hospitalization for the following Nutrition diagnosis/diagnoses:     -  10/26/2023: Severe protein-calorie malnutrition

## 2023-10-27 NOTE — DISCHARGE NOTE PROVIDER - NSDCCPCAREPLAN_GEN_ALL_CORE_FT
PRINCIPAL DISCHARGE DIAGNOSIS  Diagnosis: Orthostatic hypotension  Assessment and Plan of Treatment: You were admitted for lightheadess  Your hydralazine and water pill known as diuretic (Lasix/Bumex) have been held- you are to follow up with your PMD on when to resume these medications   Continue your metoprolol and amlodipine      SECONDARY DISCHARGE DIAGNOSES  Diagnosis: ESRD on dialysis  Assessment and Plan of Treatment: Continue HD    Diagnosis: Sarcoidosis  Assessment and Plan of Treatment: Continue prednisone     PRINCIPAL DISCHARGE DIAGNOSIS  Diagnosis: Orthostatic hypotension  Assessment and Plan of Treatment: You were admitted for lightheadess  Your hydralazine and water pill known as diuretic (Bumex) have been held- you are to follow up with your PMD on when to resume these medications   Continue your metoprolol and amlodipine      SECONDARY DISCHARGE DIAGNOSES  Diagnosis: ESRD on dialysis  Assessment and Plan of Treatment: Continue HD    Diagnosis: Sarcoidosis  Assessment and Plan of Treatment: Continue prednisone

## 2023-10-27 NOTE — PROGRESS NOTE ADULT - ASSESSMENT
52 y/o F w/ PMHx of ESRD on HD M/W/F, T2DM, HTN, sarcoidosis presents with lightheadedness/dizziness.     1) ESRD on HD-  [facility]; [facility] HD  Access: PC  consent in chart   last HD on 10/25; plan for routine HD today  trend bmp  No renal contraindication for discharge; will continue with HD at her outpatient HD unit    2)HTN:   pt presented with hypovolemia and hypotension   continue with Amlodipine 10mg on discharge   hold Hydralazine and diuretics    3) Anemia in ckd  hgb below goal   continue EPO 4k units with HD   monitor    If any questions, please feel free to contact me     Doug Stewart  Nephrology Attending  Cell #[phone]
52 y/o F w/ PMHx of ESRD on HD M/W/F, T2DM, HTN, sarcoidosis presents with lightheadedness/dizziness.     1) ESRD on HD-  [facility] HD  Acces: PC  consent in chart   last HD on 10/25; plan for routine HD tomorrow  trend bmp    2) ANemia in ckd  hgb below goal   started EPO 4k units with HD   monitor    If any questions, please feel free to contact me     Doug Stewart  Nephrology Attending  Cell #[phone]  
52 y/o F w/ PMHx of ESRD on HD M/W/F, T2DM, HTN, sarcoidosis presents from HD center unable to tolerated HD session due to lightheadedness/dizziness. Found to be orthostatic positive.

## 2023-10-27 NOTE — DISCHARGE NOTE NURSING/CASE MANAGEMENT/SOCIAL WORK - PATIENT PORTAL LINK FT
You can access the FollowMyHealth Patient Portal offered by [facility] by registering at the following website: [email or web address]. By joining [facility]’s FollowMyHealth portal, you will also be able to view your health information using other applications (apps) compatible with our system.

## 2023-10-31 ENCOUNTER — OUTPATIENT (OUTPATIENT)
Dept: OUTPATIENT SERVICES | Facility: HOSPITAL | Age: 53
LOS: 1 days | End: 2023-10-31

## 2023-10-31 ENCOUNTER — APPOINTMENT (OUTPATIENT)
Dept: INTERNAL MEDICINE | Facility: CLINIC | Age: 53
End: 2023-10-31
Payer: MEDICARE

## 2023-10-31 VITALS
BODY MASS INDEX: 20.81 KG/M2 | SYSTOLIC BLOOD PRESSURE: 156 MMHG | OXYGEN SATURATION: 99 % | WEIGHT: 106 LBS | DIASTOLIC BLOOD PRESSURE: 84 MMHG | HEIGHT: 60 IN | HEART RATE: 106 BPM

## 2023-10-31 DIAGNOSIS — Z99.2 DEPENDENCE ON RENAL DIALYSIS: ICD-10-CM

## 2023-10-31 DIAGNOSIS — G47.00 INSOMNIA, UNSPECIFIED: ICD-10-CM

## 2023-10-31 DIAGNOSIS — D50.9 IRON DEFICIENCY ANEMIA, UNSPECIFIED: ICD-10-CM

## 2023-10-31 DIAGNOSIS — Z86.79 PERSONAL HISTORY OF OTHER DISEASES OF THE CIRCULATORY SYSTEM: ICD-10-CM

## 2023-10-31 DIAGNOSIS — E11.9 TYPE 2 DIABETES MELLITUS WITHOUT COMPLICATIONS: ICD-10-CM

## 2023-10-31 DIAGNOSIS — M54.50 LOW BACK PAIN, UNSPECIFIED: ICD-10-CM

## 2023-10-31 DIAGNOSIS — N18.5 CHRONIC KIDNEY DISEASE, STAGE 5: ICD-10-CM

## 2023-10-31 DIAGNOSIS — I10 ESSENTIAL (PRIMARY) HYPERTENSION: ICD-10-CM

## 2023-10-31 DIAGNOSIS — M79.604 LOW BACK PAIN, UNSPECIFIED: ICD-10-CM

## 2023-10-31 PROCEDURE — 99495 TRANSJ CARE MGMT MOD F2F 14D: CPT

## 2023-10-31 RX ORDER — BUMETANIDE 2 MG/1
2 TABLET ORAL TWICE DAILY
Qty: 120 | Refills: 3 | Status: DISCONTINUED | COMMUNITY
Start: 2023-03-06 | End: 2023-10-31

## 2023-10-31 RX ORDER — METOLAZONE 5 MG/1
5 TABLET ORAL
Qty: 60 | Refills: 3 | Status: DISCONTINUED | COMMUNITY
Start: 2023-04-13 | End: 2023-10-31

## 2023-10-31 RX ORDER — FAMOTIDINE 20 MG/1
20 TABLET, FILM COATED ORAL
Qty: 90 | Refills: 3 | Status: DISCONTINUED | COMMUNITY
Start: 2022-09-12 | End: 2023-10-31

## 2023-10-31 RX ORDER — HYDRALAZINE HYDROCHLORIDE 100 MG/1
100 TABLET ORAL 3 TIMES DAILY
Qty: 270 | Refills: 3 | Status: DISCONTINUED | COMMUNITY
Start: 2023-02-03 | End: 2023-10-31

## 2023-11-05 PROBLEM — D50.9 ANEMIA, IRON DEFICIENCY: Status: ACTIVE | Noted: 2023-03-06

## 2023-11-06 NOTE — CHART NOTE - NSCHARTNOTEFT_GEN_A_CORE
Left a message for patient in regards to follow up care with callback information.
Left a message for patient in regards to follow up care with callback information.

## 2023-11-06 NOTE — CHART NOTE - NSCHARTNOTESELECT_GEN_ALL_CORE
Assessment:     5 wk  o  female infant  1  Health check for infant over 34 days old     2  Screening for depression           Plan:         1  Anticipatory guidance discussed  Specific topics reviewed: encouraged that any formula used be iron-fortified, impossible to "spoil" infants at this age, limit daytime sleep to 3-4 hours at a time, normal crying, safe sleep furniture, sleep face up to decrease chances of SIDS and typical  feeding habits  Not giving water  Only formula or breast milk  Proper mixing of formula  2  Screening tests:   a  State  metabolic screen: negative    3  Immunizations today: per orders  Discussed with: mother    4  Follow-up visit in 1 week for next well child visit, or sooner as needed  Will do weight check in one week  Baby is growing well on growth chart, but per mom baby has lost weight in the last week from the [facility] visit  She was 10 pounds and now she is 9 pounds 13oz  Mom was breast feeding and not supplementing  She went back to work and baby was just sleeping and waiting for mom to return from work to feed  Mom feels her milk supply is low  She just started supplementing today with 1 bottle took 3 5 ounces  Tolerating well  5   Rash on face and upper body consistent with seborrhea dermitis and contact derm  Supportive care  Discussed cradle cap care  OTC ointments  If worsens will consider hydrocortisone ointment and/or selenium sulfide shampoo  6  Mom concerned about "black spots" on tongue  Will continue to monitor  Do not appear concerning  7   noisy breathing  Physical exam is unremarkable  Advised nasal saline drops  Reassurance  continue to monitor  Subjective:     Jordon Cassidy is a 5 wk  o  female who was brought in for this well child visit  Current Issues:  Current concerns include:   1  black on tongue, not sure when first noticed  Won't wipe away  Not bothering her or interfering with eating 
d/c appt/Event Note
2  nasal congestion   Constant since birth  No apnea/cyanosis  No fussiness/fevers/chills/resp distress  Minimal spitting-up  Did come through nose once  Mom has tried suctioning mucous from mouth with bulb syringe, but not from nose  Hasn't tried nasal saline drops  Has air condition in room  Baby sleeps in bassinet and sometimes a rock and play  Well Child Assessment:  History was provided by the mother  Jairo Jarrett lives with her mother, brother, sister and grandmother  Interval problems do not include caregiver depression, caregiver stress, lack of social support, recent illness or recent injury  Nutrition  Types of milk consumed include formula and breast feeding  Breast Feeding - Feedings occur every 4-5 hours  The patient feeds from both sides  6-10 minutes are spent on the right breast  6-10 minutes are spent on the left breast  5 ounces are consumed every 24 hours  The breast milk is pumped  Formula - Types of formula consumed include cow's milk based (Similac Advance )  4 (mom just introduced formula today 2018, believes baby was not getting enough milk through breast feeding, thinks her milk supply is low ) ounces of formula are consumed per feeding  Feeding problems do not include burping poorly, spitting up or vomiting  Elimination  Urination occurs more than 6 times per 24 hours  Bowel movements occur with every feeding  Stools have a seedy and loose consistency  Elimination problems include gas  Elimination problems do not include colic, constipation, diarrhea or urinary symptoms  Sleep  The patient sleeps in her crib or bassinet  Child falls asleep while on own  Sleep positions include supine  Average sleep duration is 8 (4-6 hour nap daily ) hours  Safety  Home is child-proofed? no  There is smoking in the home  Home has working smoke alarms? yes  Home has working carbon monoxide alarms? yes  There is an appropriate car seat in use  Screening  Immunizations are up-to-date 
d/c appt/Event Note
Social  The caregiver enjoys the child  Childcare is provided at child's home  The childcare provider is a parent  Birth History    Birth     Length: 21 5" (54 6 cm)     Weight: 3820 g (8 lb 6 8 oz)     HC 36 cm (14 17")    Apgar     One: 8     Five: 9    Discharge Weight: 3440 g (7 lb 9 3 oz)    Delivery Method: Vaginal, Spontaneous Delivery    Gestation Age: 44 wks    Days in Hospital: [place] [address] Name: [facility]     Baby received double phototherapy in hospital   Mom A+  Maternal Labs negative  Borderline LGA  Baby's cbc/crp unremarkable  Hypoglycemia (glucose 40, resolved with supplementing)  Stayed in NICU for photherapy  The following portions of the patient's history were reviewed and updated as appropriate:   She  has a past medical history of Jaundice of  (2018)  She   Patient Active Problem List    Diagnosis Date Noted     screening tests negative 2018    Slow weight gain in pediatric patient 2018     She  has no past surgical history on file  She  reports that she is a non-smoker but has been exposed to tobacco smoke  She has never used smokeless tobacco  Her alcohol and drug histories are not on file              Objective:     Growth parameters are noted and are appropriate for age  Wt Readings from Last 1 Encounters:   18 4457 g (9 lb 13 2 oz) (59 %, Z= 0 21)*     * Growth percentiles are based on WHO (Girls, 0-2 years) data  Ht Readings from Last 1 Encounters:   18 21 34" (54 2 cm) (50 %, Z= 0 00)*     * Growth percentiles are based on WHO (Girls, 0-2 years) data  Head Circumference: 38 4 cm (15 12")      Vitals:    18 1812   Weight: 4457 g (9 lb 13 2 oz)   Height: 21 34" (54 2 cm)   HC: 38 4 cm (15 12")       Physical Exam  Vitals reviewed and are appropriate for age  Growth parameters reviewed    Growing along [facility]   (per mom baby has lost weight from [facility] apt)  General: awake, alert, behavior
appropriate for age and no distress  Head: normocephalic, atraumatic, anterior fontanel is open, soft, and flat,  Ears: no deformities noted on external ear exam; no pits/tags; canals are bilaterally patent without exudate or inflammation; tympanic membranes are intact with light reflex and landmarks visible  Eyes: red reflex is symmetric and present, corneal light reflex is symmetrical and present, extraocular movements are intact; pupils are equal, round and reactive to light; no noted discharge or injection  Nose: nares patent, no discharge  Oropharynx: oral cavity is without lesions, palate normal; moist mucosal membranes; tonsils are symmetric and without erythema or exudate   + hyper pigmented (bluish/black discoloration on anterior portion of tongue)  Neck: supple, FROM, no torticolis  Resp: regular rate, lungs clear to auscultation; no wheezes/crackles appreciated; no increased work of breathing  Intermittent upper airway transmitted noises  Cardiac: regular rate and rhythm; s1 and s2 present; no murmurs, symmetric femoral pulses, well perfused  Abdomen: round, soft, normoactive BS throughout, nontender/nondistended; no hepatosplenomegaly appreciated  : sexual maturity rating 1, anatomy appropriate for age/no deformities noted  MSK: symmetric movement u/e and l/e, no edema noted; no hip clicks/clunks noted, clavicles intact  Skin: + flakes/plaques stuck on scalp   + erythematous to skin colored papules located along hair line, behind ears, face and upper chest and back      Neuro: developmentally appropriate; no focal deficits noted, primitive reflexes intact  Spine: no sacral dimples/pits/miriam of hair

## 2023-11-08 ENCOUNTER — APPOINTMENT (OUTPATIENT)
Dept: NEPHROLOGY | Facility: CLINIC | Age: 53
End: 2023-11-08

## 2023-11-13 PROCEDURE — 82435 ASSAY OF BLOOD CHLORIDE: CPT

## 2023-11-13 PROCEDURE — 82330 ASSAY OF CALCIUM: CPT

## 2023-11-13 PROCEDURE — 85014 HEMATOCRIT: CPT

## 2023-11-13 PROCEDURE — 85025 COMPLETE CBC W/AUTO DIFF WBC: CPT

## 2023-11-13 PROCEDURE — 83605 ASSAY OF LACTIC ACID: CPT

## 2023-11-13 PROCEDURE — 82803 BLOOD GASES ANY COMBINATION: CPT

## 2023-11-13 PROCEDURE — 83036 HEMOGLOBIN GLYCOSYLATED A1C: CPT

## 2023-11-13 PROCEDURE — 99285 EMERGENCY DEPT VISIT HI MDM: CPT

## 2023-11-13 PROCEDURE — 85018 HEMOGLOBIN: CPT

## 2023-11-13 PROCEDURE — 87040 BLOOD CULTURE FOR BACTERIA: CPT

## 2023-11-13 PROCEDURE — 97116 GAIT TRAINING THERAPY: CPT

## 2023-11-13 PROCEDURE — 97161 PT EVAL LOW COMPLEX 20 MIN: CPT

## 2023-11-13 PROCEDURE — 99261: CPT

## 2023-11-13 PROCEDURE — 84100 ASSAY OF PHOSPHORUS: CPT

## 2023-11-13 PROCEDURE — 71045 X-RAY EXAM CHEST 1 VIEW: CPT

## 2023-11-13 PROCEDURE — 36415 COLL VENOUS BLD VENIPUNCTURE: CPT

## 2023-11-13 PROCEDURE — 82962 GLUCOSE BLOOD TEST: CPT

## 2023-11-13 PROCEDURE — 84295 ASSAY OF SERUM SODIUM: CPT

## 2023-11-13 PROCEDURE — 80048 BASIC METABOLIC PNL TOTAL CA: CPT

## 2023-11-13 PROCEDURE — 80053 COMPREHEN METABOLIC PANEL: CPT

## 2023-11-13 PROCEDURE — 83735 ASSAY OF MAGNESIUM: CPT

## 2023-11-13 PROCEDURE — 97110 THERAPEUTIC EXERCISES: CPT

## 2023-11-13 PROCEDURE — 82947 ASSAY GLUCOSE BLOOD QUANT: CPT

## 2023-11-13 PROCEDURE — 84132 ASSAY OF SERUM POTASSIUM: CPT

## 2023-11-13 NOTE — PROGRESS NOTE ADULT - PROBLEM/PLAN-8
DISPLAY PLAN FREE TEXT
no concerns

## 2023-11-16 ENCOUNTER — RX RENEWAL (OUTPATIENT)
Age: 53
End: 2023-11-16

## 2023-12-08 ENCOUNTER — INPATIENT (INPATIENT)
Facility: HOSPITAL | Age: 53
LOS: 1 days | Discharge: HOME | DRG: 304 | End: 2023-12-10
Attending: STUDENT IN AN ORGANIZED HEALTH CARE EDUCATION/TRAINING PROGRAM | Admitting: STUDENT IN AN ORGANIZED HEALTH CARE EDUCATION/TRAINING PROGRAM
Payer: MEDICARE

## 2023-12-08 VITALS
DIASTOLIC BLOOD PRESSURE: 120 MMHG | HEART RATE: 113 BPM | TEMPERATURE: 98 F | WEIGHT: 105.16 LBS | SYSTOLIC BLOOD PRESSURE: 209 MMHG | RESPIRATION RATE: 18 BRPM | OXYGEN SATURATION: 98 %

## 2023-12-08 DIAGNOSIS — E11.9 TYPE 2 DIABETES MELLITUS WITHOUT COMPLICATIONS: ICD-10-CM

## 2023-12-08 DIAGNOSIS — Z29.9 ENCOUNTER FOR PROPHYLACTIC MEASURES, UNSPECIFIED: ICD-10-CM

## 2023-12-08 DIAGNOSIS — N18.6 END STAGE RENAL DISEASE: ICD-10-CM

## 2023-12-08 DIAGNOSIS — I10 ESSENTIAL (PRIMARY) HYPERTENSION: ICD-10-CM

## 2023-12-08 DIAGNOSIS — D86.9 SARCOIDOSIS, UNSPECIFIED: ICD-10-CM

## 2023-12-08 DIAGNOSIS — E78.5 HYPERLIPIDEMIA, UNSPECIFIED: ICD-10-CM

## 2023-12-08 LAB
ALBUMIN SERPL ELPH-MCNC: 2.6 G/DL — LOW (ref 3.5–5)
ALBUMIN SERPL ELPH-MCNC: 2.6 G/DL — LOW (ref 3.5–5)
ALP SERPL-CCNC: 54 U/L — SIGNIFICANT CHANGE UP (ref 40–120)
ALP SERPL-CCNC: 54 U/L — SIGNIFICANT CHANGE UP (ref 40–120)
ALT FLD-CCNC: 13 U/L DA — SIGNIFICANT CHANGE UP (ref 10–60)
ALT FLD-CCNC: 13 U/L DA — SIGNIFICANT CHANGE UP (ref 10–60)
ANION GAP SERPL CALC-SCNC: 9 MMOL/L — SIGNIFICANT CHANGE UP (ref 5–17)
ANION GAP SERPL CALC-SCNC: 9 MMOL/L — SIGNIFICANT CHANGE UP (ref 5–17)
APTT BLD: 36 SEC — HIGH (ref 24.5–35.6)
APTT BLD: 36 SEC — HIGH (ref 24.5–35.6)
AST SERPL-CCNC: 15 U/L — SIGNIFICANT CHANGE UP (ref 10–40)
AST SERPL-CCNC: 15 U/L — SIGNIFICANT CHANGE UP (ref 10–40)
BASOPHILS # BLD AUTO: 0.03 K/UL — SIGNIFICANT CHANGE UP (ref 0–0.2)
BASOPHILS # BLD AUTO: 0.03 K/UL — SIGNIFICANT CHANGE UP (ref 0–0.2)
BASOPHILS NFR BLD AUTO: 0.4 % — SIGNIFICANT CHANGE UP (ref 0–2)
BASOPHILS NFR BLD AUTO: 0.4 % — SIGNIFICANT CHANGE UP (ref 0–2)
BILIRUB SERPL-MCNC: 0.3 MG/DL — SIGNIFICANT CHANGE UP (ref 0.2–1.2)
BILIRUB SERPL-MCNC: 0.3 MG/DL — SIGNIFICANT CHANGE UP (ref 0.2–1.2)
BUN SERPL-MCNC: 65 MG/DL — HIGH (ref 7–18)
BUN SERPL-MCNC: 65 MG/DL — HIGH (ref 7–18)
CALCIUM SERPL-MCNC: 8.3 MG/DL — LOW (ref 8.4–10.5)
CALCIUM SERPL-MCNC: 8.3 MG/DL — LOW (ref 8.4–10.5)
CHLORIDE SERPL-SCNC: 106 MMOL/L — SIGNIFICANT CHANGE UP (ref 96–108)
CHLORIDE SERPL-SCNC: 106 MMOL/L — SIGNIFICANT CHANGE UP (ref 96–108)
CO2 SERPL-SCNC: 25 MMOL/L — SIGNIFICANT CHANGE UP (ref 22–31)
CO2 SERPL-SCNC: 25 MMOL/L — SIGNIFICANT CHANGE UP (ref 22–31)
CREAT SERPL-MCNC: 12.8 MG/DL — HIGH (ref 0.5–1.3)
CREAT SERPL-MCNC: 12.8 MG/DL — HIGH (ref 0.5–1.3)
EGFR: 3 ML/MIN/1.73M2 — LOW
EGFR: 3 ML/MIN/1.73M2 — LOW
EOSINOPHIL # BLD AUTO: 0.4 K/UL — SIGNIFICANT CHANGE UP (ref 0–0.5)
EOSINOPHIL # BLD AUTO: 0.4 K/UL — SIGNIFICANT CHANGE UP (ref 0–0.5)
EOSINOPHIL NFR BLD AUTO: 5.8 % — SIGNIFICANT CHANGE UP (ref 0–6)
EOSINOPHIL NFR BLD AUTO: 5.8 % — SIGNIFICANT CHANGE UP (ref 0–6)
GLUCOSE BLDC GLUCOMTR-MCNC: 124 MG/DL — HIGH (ref 70–99)
GLUCOSE BLDC GLUCOMTR-MCNC: 124 MG/DL — HIGH (ref 70–99)
GLUCOSE SERPL-MCNC: 156 MG/DL — HIGH (ref 70–99)
GLUCOSE SERPL-MCNC: 156 MG/DL — HIGH (ref 70–99)
HCT VFR BLD CALC: 35.4 % — SIGNIFICANT CHANGE UP (ref 34.5–45)
HCT VFR BLD CALC: 35.4 % — SIGNIFICANT CHANGE UP (ref 34.5–45)
HGB BLD-MCNC: 11.2 G/DL — LOW (ref 11.5–15.5)
HGB BLD-MCNC: 11.2 G/DL — LOW (ref 11.5–15.5)
IMM GRANULOCYTES NFR BLD AUTO: 0.6 % — SIGNIFICANT CHANGE UP (ref 0–0.9)
IMM GRANULOCYTES NFR BLD AUTO: 0.6 % — SIGNIFICANT CHANGE UP (ref 0–0.9)
INR BLD: 0.93 RATIO — SIGNIFICANT CHANGE UP (ref 0.85–1.18)
INR BLD: 0.93 RATIO — SIGNIFICANT CHANGE UP (ref 0.85–1.18)
LYMPHOCYTES # BLD AUTO: 1.63 K/UL — SIGNIFICANT CHANGE UP (ref 1–3.3)
LYMPHOCYTES # BLD AUTO: 1.63 K/UL — SIGNIFICANT CHANGE UP (ref 1–3.3)
LYMPHOCYTES # BLD AUTO: 23.6 % — SIGNIFICANT CHANGE UP (ref 13–44)
LYMPHOCYTES # BLD AUTO: 23.6 % — SIGNIFICANT CHANGE UP (ref 13–44)
MCHC RBC-ENTMCNC: 27.9 PG — SIGNIFICANT CHANGE UP (ref 27–34)
MCHC RBC-ENTMCNC: 27.9 PG — SIGNIFICANT CHANGE UP (ref 27–34)
MCHC RBC-ENTMCNC: 31.6 GM/DL — LOW (ref 32–36)
MCHC RBC-ENTMCNC: 31.6 GM/DL — LOW (ref 32–36)
MCV RBC AUTO: 88.3 FL — SIGNIFICANT CHANGE UP (ref 80–100)
MCV RBC AUTO: 88.3 FL — SIGNIFICANT CHANGE UP (ref 80–100)
MONOCYTES # BLD AUTO: 0.51 K/UL — SIGNIFICANT CHANGE UP (ref 0–0.9)
MONOCYTES # BLD AUTO: 0.51 K/UL — SIGNIFICANT CHANGE UP (ref 0–0.9)
MONOCYTES NFR BLD AUTO: 7.4 % — SIGNIFICANT CHANGE UP (ref 2–14)
MONOCYTES NFR BLD AUTO: 7.4 % — SIGNIFICANT CHANGE UP (ref 2–14)
NEUTROPHILS # BLD AUTO: 4.31 K/UL — SIGNIFICANT CHANGE UP (ref 1.8–7.4)
NEUTROPHILS # BLD AUTO: 4.31 K/UL — SIGNIFICANT CHANGE UP (ref 1.8–7.4)
NEUTROPHILS NFR BLD AUTO: 62.2 % — SIGNIFICANT CHANGE UP (ref 43–77)
NEUTROPHILS NFR BLD AUTO: 62.2 % — SIGNIFICANT CHANGE UP (ref 43–77)
NRBC # BLD: 0 /100 WBCS — SIGNIFICANT CHANGE UP (ref 0–0)
NRBC # BLD: 0 /100 WBCS — SIGNIFICANT CHANGE UP (ref 0–0)
PLATELET # BLD AUTO: 260 K/UL — SIGNIFICANT CHANGE UP (ref 150–400)
PLATELET # BLD AUTO: 260 K/UL — SIGNIFICANT CHANGE UP (ref 150–400)
POTASSIUM SERPL-MCNC: 5.5 MMOL/L — HIGH (ref 3.5–5.3)
POTASSIUM SERPL-MCNC: 5.5 MMOL/L — HIGH (ref 3.5–5.3)
POTASSIUM SERPL-SCNC: 5.5 MMOL/L — HIGH (ref 3.5–5.3)
POTASSIUM SERPL-SCNC: 5.5 MMOL/L — HIGH (ref 3.5–5.3)
PROT SERPL-MCNC: 6.4 G/DL — SIGNIFICANT CHANGE UP (ref 6–8.3)
PROT SERPL-MCNC: 6.4 G/DL — SIGNIFICANT CHANGE UP (ref 6–8.3)
PROTHROM AB SERPL-ACNC: 10.6 SEC — SIGNIFICANT CHANGE UP (ref 9.5–13)
PROTHROM AB SERPL-ACNC: 10.6 SEC — SIGNIFICANT CHANGE UP (ref 9.5–13)
RBC # BLD: 4.01 M/UL — SIGNIFICANT CHANGE UP (ref 3.8–5.2)
RBC # BLD: 4.01 M/UL — SIGNIFICANT CHANGE UP (ref 3.8–5.2)
RBC # FLD: 14 % — SIGNIFICANT CHANGE UP (ref 10.3–14.5)
RBC # FLD: 14 % — SIGNIFICANT CHANGE UP (ref 10.3–14.5)
SODIUM SERPL-SCNC: 140 MMOL/L — SIGNIFICANT CHANGE UP (ref 135–145)
SODIUM SERPL-SCNC: 140 MMOL/L — SIGNIFICANT CHANGE UP (ref 135–145)
WBC # BLD: 6.92 K/UL — SIGNIFICANT CHANGE UP (ref 3.8–10.5)
WBC # BLD: 6.92 K/UL — SIGNIFICANT CHANGE UP (ref 3.8–10.5)
WBC # FLD AUTO: 6.92 K/UL — SIGNIFICANT CHANGE UP (ref 3.8–10.5)
WBC # FLD AUTO: 6.92 K/UL — SIGNIFICANT CHANGE UP (ref 3.8–10.5)

## 2023-12-08 PROCEDURE — 71045 X-RAY EXAM CHEST 1 VIEW: CPT | Mod: 26

## 2023-12-08 PROCEDURE — 99223 1ST HOSP IP/OBS HIGH 75: CPT | Mod: GC

## 2023-12-08 PROCEDURE — 99285 EMERGENCY DEPT VISIT HI MDM: CPT

## 2023-12-08 RX ORDER — ATORVASTATIN CALCIUM 80 MG/1
1 TABLET, FILM COATED ORAL
Qty: 0 | Refills: 0 | DISCHARGE

## 2023-12-08 RX ORDER — FERROUS SULFATE 325(65) MG
325 TABLET ORAL DAILY
Refills: 0 | Status: DISCONTINUED | OUTPATIENT
Start: 2023-12-08 | End: 2023-12-10

## 2023-12-08 RX ORDER — INSULIN LISPRO 100/ML
VIAL (ML) SUBCUTANEOUS AT BEDTIME
Refills: 0 | Status: DISCONTINUED | OUTPATIENT
Start: 2023-12-08 | End: 2023-12-10

## 2023-12-08 RX ORDER — ERYTHROPOIETIN 10000 [IU]/ML
4000 INJECTION, SOLUTION INTRAVENOUS; SUBCUTANEOUS
Refills: 0 | Status: DISCONTINUED | OUTPATIENT
Start: 2023-12-08 | End: 2023-12-10

## 2023-12-08 RX ORDER — HYDRALAZINE HCL 50 MG
100 TABLET ORAL ONCE
Refills: 0 | Status: COMPLETED | OUTPATIENT
Start: 2023-12-08 | End: 2023-12-08

## 2023-12-08 RX ORDER — INSULIN LISPRO 100/ML
VIAL (ML) SUBCUTANEOUS
Refills: 0 | Status: DISCONTINUED | OUTPATIENT
Start: 2023-12-08 | End: 2023-12-10

## 2023-12-08 RX ORDER — METOPROLOL TARTRATE 50 MG
25 TABLET ORAL DAILY
Refills: 0 | Status: DISCONTINUED | OUTPATIENT
Start: 2023-12-08 | End: 2023-12-10

## 2023-12-08 RX ORDER — INSULIN GLARGINE 100 [IU]/ML
17 INJECTION, SOLUTION SUBCUTANEOUS AT BEDTIME
Refills: 0 | Status: DISCONTINUED | OUTPATIENT
Start: 2023-12-08 | End: 2023-12-10

## 2023-12-08 RX ORDER — ACETAMINOPHEN 500 MG
725 TABLET ORAL ONCE
Refills: 0 | Status: DISCONTINUED | OUTPATIENT
Start: 2023-12-08 | End: 2023-12-10

## 2023-12-08 RX ORDER — SODIUM ZIRCONIUM CYCLOSILICATE 10 G/10G
10 POWDER, FOR SUSPENSION ORAL ONCE
Refills: 0 | Status: COMPLETED | OUTPATIENT
Start: 2023-12-08 | End: 2023-12-08

## 2023-12-08 RX ORDER — HEPARIN SODIUM 5000 [USP'U]/ML
5000 INJECTION INTRAVENOUS; SUBCUTANEOUS EVERY 8 HOURS
Refills: 0 | Status: DISCONTINUED | OUTPATIENT
Start: 2023-12-08 | End: 2023-12-10

## 2023-12-08 RX ORDER — INSULIN LISPRO 100/ML
10 VIAL (ML) SUBCUTANEOUS
Refills: 0 | Status: DISCONTINUED | OUTPATIENT
Start: 2023-12-08 | End: 2023-12-10

## 2023-12-08 RX ORDER — AMLODIPINE BESYLATE 2.5 MG/1
10 TABLET ORAL DAILY
Refills: 0 | Status: DISCONTINUED | OUTPATIENT
Start: 2023-12-08 | End: 2023-12-09

## 2023-12-08 RX ORDER — ALBUTEROL 90 UG/1
2 AEROSOL, METERED ORAL EVERY 4 HOURS
Refills: 0 | Status: DISCONTINUED | OUTPATIENT
Start: 2023-12-08 | End: 2023-12-10

## 2023-12-08 RX ORDER — LACTOBACILLUS ACIDOPHILUS 100MM CELL
1 CAPSULE ORAL DAILY
Refills: 0 | Status: DISCONTINUED | OUTPATIENT
Start: 2023-12-08 | End: 2023-12-10

## 2023-12-08 RX ORDER — SEVELAMER CARBONATE 2400 MG/1
1600 POWDER, FOR SUSPENSION ORAL THREE TIMES A DAY
Refills: 0 | Status: DISCONTINUED | OUTPATIENT
Start: 2023-12-08 | End: 2023-12-10

## 2023-12-08 RX ORDER — ATORVASTATIN CALCIUM 80 MG/1
80 TABLET, FILM COATED ORAL AT BEDTIME
Refills: 0 | Status: DISCONTINUED | OUTPATIENT
Start: 2023-12-08 | End: 2023-12-10

## 2023-12-08 RX ORDER — SODIUM BICARBONATE 1 MEQ/ML
650 SYRINGE (ML) INTRAVENOUS THREE TIMES A DAY
Refills: 0 | Status: DISCONTINUED | OUTPATIENT
Start: 2023-12-08 | End: 2023-12-09

## 2023-12-08 RX ORDER — ERGOCALCIFEROL 1.25 MG/1
50000 CAPSULE ORAL
Refills: 0 | Status: DISCONTINUED | OUTPATIENT
Start: 2023-12-08 | End: 2023-12-10

## 2023-12-08 RX ORDER — HYDRALAZINE HCL 50 MG
50 TABLET ORAL ONCE
Refills: 0 | Status: COMPLETED | OUTPATIENT
Start: 2023-12-08 | End: 2023-12-08

## 2023-12-08 RX ORDER — GABAPENTIN 400 MG/1
300 CAPSULE ORAL DAILY
Refills: 0 | Status: DISCONTINUED | OUTPATIENT
Start: 2023-12-08 | End: 2023-12-10

## 2023-12-08 RX ADMIN — Medication 50 MILLIGRAM(S): at 19:31

## 2023-12-08 RX ADMIN — INSULIN GLARGINE 17 UNIT(S): 100 INJECTION, SOLUTION SUBCUTANEOUS at 23:53

## 2023-12-08 RX ADMIN — SEVELAMER CARBONATE 1600 MILLIGRAM(S): 2400 POWDER, FOR SUSPENSION ORAL at 23:50

## 2023-12-08 RX ADMIN — ATORVASTATIN CALCIUM 80 MILLIGRAM(S): 80 TABLET, FILM COATED ORAL at 23:51

## 2023-12-08 RX ADMIN — HEPARIN SODIUM 5000 UNIT(S): 5000 INJECTION INTRAVENOUS; SUBCUTANEOUS at 23:51

## 2023-12-08 RX ADMIN — Medication 100 MILLIGRAM(S): at 23:51

## 2023-12-08 RX ADMIN — SODIUM ZIRCONIUM CYCLOSILICATE 10 GRAM(S): 10 POWDER, FOR SUSPENSION ORAL at 23:52

## 2023-12-08 NOTE — H&P ADULT - HISTORY OF PRESENT ILLNESS
52 y/o F w/ PMHx of ESRD on HD M/W/F, T2DM, HTN, sarcoidosis, anemia, seizure disorder (no longer on meds)  presents to the ED after missing Monday and Wednesday dialysis. She was directed to come here for inpatient HD  54 y/o F w/ PMHx of ESRD on HD M/W/F, T2DM, HTN, sarcoidosis, anemia, seizure disorder (no longer on meds)  presents to the ED after missing Monday and Wednesday dialysis. She was directed to come here for inpatient HD  52 y/o F w/ PMHx of ESRD on HD M/W/F, T2DM, HTN, sarcoidosis, anemia, seizure disorder (no longer on meds)  presents to the ED after missing Monday and Wednesday dialysis. On Monday, pt sustained a fall, mechanical fall according to the patient, but she has been increasingly weak over the past weeks. On Wednesday she stated that she was having copious diarrhea (has been going on for a week), watery, non-bloody diarrhea, which stopped her from going to HD. No sick contacts, no recent travel. She arrived at her HD location, but shewas directed to come here for inpatient HD.  54 y/o F w/ PMHx of ESRD on HD M/W/F, T2DM, HTN, sarcoidosis, anemia, seizure disorder (no longer on meds)  presents to the ED after missing Monday and Wednesday dialysis. On Monday, pt sustained a fall, mechanical fall according to the patient, but she has been increasingly weak over the past weeks. On Wednesday she stated that she was having copious diarrhea (has been going on for a week), watery, non-bloody diarrhea, which stopped her from going to HD. No sick contacts, no recent travel. She arrived at her HD location, but she was directed to come here for inpatient HD.  52 y/o F w/ PMHx of ESRD on HD M/W/F, T2DM, HTN, sarcoidosis, anemia, seizure disorder (no longer on meds)  presents to the ED after missing Monday and Wednesday dialysis. On Monday, pt sustained a fall, mechanical fall according to the patient, but she has been increasingly weak over the past weeks. On Wednesday she stated that she was having copious diarrhea (has been going on for a week), watery, non-bloody diarrhea, which stopped her from going to HD. No sick contacts, no recent travel. She arrived at her HD location, but she was directed to come here for inpatient HD.

## 2023-12-08 NOTE — H&P ADULT - PROBLEM SELECTOR PLAN 2
- Hx of DM on lantus 17 at bedtime novolog 10u TID  - c/w home regimen as last POCT 326  - ISS fs, qhs and before meals - hyperkalemic to 5.5   - s/p  lokelma  - f/u morning BMP

## 2023-12-08 NOTE — H&P ADULT - ASSESSMENT
54 y/o F w/ PMHx of ESRD on HD M/W/F, T2DM, HTN, HLD, sarcoidosis, anemia, seizure disorder (no longer on meds) presents to the ED after missing Monday and Wednesday dialysis. She was directed to come here for inpatient HD. Vitals significant for 217/120. Labs sig for K 5.5, BUN/Cr 65/12.8, POCT 187 > 326. s/p hydral 100mg in the ED. Admitted for in-patient HD.

## 2023-12-08 NOTE — ED ADULT NURSE NOTE - OBJECTIVE STATEMENT
52 y/o female received awake, alert and oriented x3, with no physical complaint. States she missed 1 week of hemodialysis , was told from her home hemo site to get cleared from emergency department to receive hemodialysis therapy  . Denies any chest pain or shortness of breath at this time. 54 y/o female received awake, alert and oriented x3, with no physical complaint. States she missed 1 week of hemodialysis , was told from her home hemo site to get cleared from emergency department to receive hemodialysis therapy  . Denies any chest pain or shortness of breath at this time.

## 2023-12-08 NOTE — H&P ADULT - NSHPPHYSICALEXAM_GEN_ALL_CORE
T(C): 37.2 (12-09-23 @ 00:43), Max: 37.5 (12-08-23 @ 19:07)  HR: 116 (12-09-23 @ 00:43) (104 - 116)  BP: 206/112 (12-09-23 @ 00:43) (205/116 - 217/120)  RR: 18 (12-09-23 @ 00:43) (17 - 18)  SpO2: 97% (12-09-23 @ 00:43) (97% - 98%)    CONSTITUTIONAL: Well groomed, no apparent distress  EYES: PERRLA and symmetric, EOMI, No conjunctival or scleral injection, non-icteric  ENMT: Oral mucosa with moist membranes.  RESP: No respiratory distress, no use of accessory muscles; (+) mild inspiratory crackles  CV: RRR, +S1S2, no MRG; no JVD; (+) trace peripheral edema in ankles   GI: Soft, NT, ND, no rebound, no guarding; no palpable masses; no hepatosplenomegaly; no hernia palpated  MSK: Normal ROM without pain, no spinal tenderness, normal muscle strength/tone  SKIN: No rashes or ulcers noted; no subcutaneous nodules or induration palpable  NEURO: CN II-XII intact; normal reflexes in upper and lower extremities, sensation intact in upper and lower extremities b/l to light touch   PSYCH: Appropriate insight/judgment; A+O x 3, mood and affect appropriate, recent/remote memory intact

## 2023-12-08 NOTE — ED PROVIDER NOTE - CLINICAL SUMMARY MEDICAL DECISION MAKING FREE TEXT BOX
Dr. Hills will do the dialysis on her tomorrow. Dr. Hills will do the dialysis on her tomorrow.  pt with missed dialysis x two   comes in for dialysis , she willl not ne allowed at center   pt asymptomatic   hypertension

## 2023-12-08 NOTE — H&P ADULT - PROBLEM SELECTOR PLAN 1
-  presents to the ED after missing Monday and Wednesday dialysis.   - She was directed to come here for inpatient HD.   - Vitals significant for 217/120.  - Labs sig for K 5.5, BUN/Cr 65/12.8, POCT 187 > 326.   Admitted for in-patient HD  - Dr. Estrada consulted, will under HD -  presents to the ED after missing Monday and Wednesday dialysis.   - She was directed to come here for inpatient HD.   - Vitals significant for 217/120.  - Labs sig for K 5.5, BUN/Cr 65/12.8, POCT 187 > 326.   Admitted for in-patient HD  - Dr. Estrada consulted, will go for HD tomorrow

## 2023-12-08 NOTE — ED ADULT NURSE NOTE - NSFALLUNIVINTERV_ED_ALL_ED
Bed/Stretcher in lowest position, wheels locked, appropriate side rails in place/Call bell, personal items and telephone in reach/Instruct patient to call for assistance before getting out of bed/chair/stretcher/Non-slip footwear applied when patient is off stretcher/[place] to call system/Physically safe environment - no spills, clutter or unnecessary equipment/Purposeful proactive rounding/Room/bathroom lighting operational, light cord in reach Bed/Stretcher in lowest position, wheels locked, appropriate side rails in place/Call bell, personal items and telephone in reach/Instruct patient to call for assistance before getting out of bed/chair/stretcher/Non-slip footwear applied when patient is off stretcher/[place] to call system/Physically safe environment - no spills, clutter or unnecessary equipment/Purposeful proactive rounding/Room/bathroom lighting operational, light cord in reach

## 2023-12-08 NOTE — H&P ADULT - PROBLEM SELECTOR PLAN 4
- hx of sarcoidosis on prednisone 10mg qd  - c/w home med - hx of HTN on metoprolol and amlodipine  - now in hypertensive emergency 217/120  - s/p hydral 50mg po in the ED, did not improve   - s/p labetalol 10 x1   - pt stated that her BP meds changed recently,  - start hydral 50 TID   - will continue to monitor and restart home meds amlodipine and metoprolol  - PRIMARY TEAM TO CONFIRM MED REC

## 2023-12-08 NOTE — ED ADULT NURSE REASSESSMENT NOTE - NS ED NURSE REASSESS COMMENT FT1
This nurse assess pt, pt reports not needing anything at the moment, pt is ax4, on room air, pt is ambulatory, pt is aware of POC, bed in low position, side rails are up, call light within reach, no other concerns are noted.

## 2023-12-08 NOTE — ED PROVIDER NOTE - BIRTH SEX
Methotrexate Pregnancy And Lactation Text: This medication is Pregnancy Category X and is known to cause fetal harm. This medication is excreted in breast milk. Female

## 2023-12-08 NOTE — ED PROVIDER NOTE - OBJECTIVE STATEMENT
53 year old female presents to the ED for dialysis. Patient states she missed her Monday and Wednesday dialysis and isn't allowed back until she has dialysis here.

## 2023-12-08 NOTE — H&P ADULT - ATTENDING COMMENTS
I have personally reviewed the labs, imaging and ekg. CXR w/o focal consolidations, some signs of pulmonary congestion, EKG with Sinus tachycardia  QTc 486 non-specific ST segment findings. Case discussed with ER Physician Dr. Kang    53F w/ PMHx of ESRD on HD M/W/F, T2DM, HTN, HLD, sarcoidosis, anemia, seizure disorder (no longer on meds) presents to the ED after missing Monday and Wednesday dialysis. Pt states she missed one session because she fell while talking to daughter on phone. She sat in chair afterwards with no obvious sequelae. hit her face. Second episode from diarrhea x1 day which has since improved. Reportedly not allowed back at dialysis today. Nephrology called in ER, plan for dialysis in AM. Pt otherwise endorsing some orthopnea, mild headache.  after hydralazine on my bedside exam. Pt endorsing medication compliance    #Hypertensive Emergency  #Hyperkalemia  #Missed Dialysis  #ESRD on HD  #Sarcoid    -Give Lokelma STAT  -Cont. home BP regimen, amlodipine, hydralazine (dose?), not clear if still on Toprol can cont. for now  -F/u Nephrology recommendations  -Cont. Prednisone  -Can cont. prior admission insulin regimen for now, Pt states she takes 35U Lantus at night at home  -Cont. Phosphate binder  -Repeat EKG in AM  -Trend BMP, Mg, vital signs  -DVT PPx, Hep subq I have personally reviewed the labs, imaging and ekg. CXR w/o focal consolidations, some signs of pulmonary congestion, EKG with Sinus tachycardia  QTc 486 non-specific ST segment findings. Case discussed with ER Physician Dr. Kang    53F w/ PMHx of ESRD on HD M/W/F, T2DM, HTN, HLD, sarcoidosis, anemia, seizure disorder (no longer on meds) presents to the ED after missing Monday and Wednesday dialysis. Pt states she missed one session because she fell while talking to daughter on phone. She sat in chair afterwards with no obvious sequelae. hit her face. Second episode from diarrhea x1 day which has since improved. Reportedly not allowed back at dialysis today. Nephrology called in ER, plan for dialysis in AM. Pt otherwise endorsing some orthopnea, mild headache.  after hydralazine on my bedside exam. Pt endorsing medication compliance    #Hypertensive Emergency  #Hyperkalemia  #Missed Dialysis  #ESRD on HD  #Sarcoid    -Give Lokelma STAT  -Cont. home BP regimen, amlodipine, hydralazine (dose?), not clear if still on Toprol can cont. for now  -F/u Nephrology recommendations  -Cont. Prednisone  -Can cont. prior admission insulin regimen for now, Pt states she takes 35U Lantus at night at home  -Cont. Phosphate binder  -Repeat EKG in AM  -Trend BMP, Mg, vital signs  -Doesn't remember meds well, need med rec  -DVT PPx, Hep subq

## 2023-12-08 NOTE — H&P ADULT - PROBLEM SELECTOR PLAN 3
- hx of HTN on metoprolol and amlodipine  - now in hypertensive emergency 217/120  - s/p hydral 100mg po in the ED  - will continue to monitor and restart home meds - hx of HTN on metoprolol and amlodipine  - now in hypertensive emergency 217/120  - s/p hydral 100mg po in the ED, did not improve   - s/p labetalol 10 x1   - pt stated that her BP meds changed recently   - will continue to monitor and restart home meds  - PRIMARY TEAM TO CONFIRM MED REC - hx of HTN on metoprolol and amlodipine  - now in hypertensive emergency 217/120  - s/p hydral 100mg po in the ED, did not improve   - s/p labetalol 10 x1   - pt stated that her BP meds changed recently, start hydral 50 TID   - will continue to monitor and restart home meds  - PRIMARY TEAM TO CONFIRM MED REC - hx of HTN on metoprolol and amlodipine  - now in hypertensive emergency 217/120  - s/p hydral 50mg po in the ED, did not improve   - s/p labetalol 10 x1   - pt stated that her BP meds changed recently,  - start hydral 50 TID   - will continue to monitor and restart home meds amlodipine and metoprolol  - PRIMARY TEAM TO CONFIRM MED REC - Hx of DM on lantus 17 at bedtime novolog 10u TID  - c/w home regimen as last POCT 326  - ISS fs, qhs and before meals

## 2023-12-09 DIAGNOSIS — E87.5 HYPERKALEMIA: ICD-10-CM

## 2023-12-09 DIAGNOSIS — I16.1 HYPERTENSIVE EMERGENCY: ICD-10-CM

## 2023-12-09 LAB
ALBUMIN SERPL ELPH-MCNC: 2.2 G/DL — LOW (ref 3.5–5)
ALBUMIN SERPL ELPH-MCNC: 2.2 G/DL — LOW (ref 3.5–5)
ALP SERPL-CCNC: 47 U/L — SIGNIFICANT CHANGE UP (ref 40–120)
ALP SERPL-CCNC: 47 U/L — SIGNIFICANT CHANGE UP (ref 40–120)
ALT FLD-CCNC: 12 U/L DA — SIGNIFICANT CHANGE UP (ref 10–60)
ALT FLD-CCNC: 12 U/L DA — SIGNIFICANT CHANGE UP (ref 10–60)
ANION GAP SERPL CALC-SCNC: 11 MMOL/L — SIGNIFICANT CHANGE UP (ref 5–17)
ANION GAP SERPL CALC-SCNC: 11 MMOL/L — SIGNIFICANT CHANGE UP (ref 5–17)
AST SERPL-CCNC: 14 U/L — SIGNIFICANT CHANGE UP (ref 10–40)
AST SERPL-CCNC: 14 U/L — SIGNIFICANT CHANGE UP (ref 10–40)
BASOPHILS # BLD AUTO: 0.04 K/UL — SIGNIFICANT CHANGE UP (ref 0–0.2)
BASOPHILS # BLD AUTO: 0.04 K/UL — SIGNIFICANT CHANGE UP (ref 0–0.2)
BASOPHILS NFR BLD AUTO: 0.5 % — SIGNIFICANT CHANGE UP (ref 0–2)
BASOPHILS NFR BLD AUTO: 0.5 % — SIGNIFICANT CHANGE UP (ref 0–2)
BILIRUB SERPL-MCNC: 0.3 MG/DL — SIGNIFICANT CHANGE UP (ref 0.2–1.2)
BILIRUB SERPL-MCNC: 0.3 MG/DL — SIGNIFICANT CHANGE UP (ref 0.2–1.2)
BUN SERPL-MCNC: 65 MG/DL — HIGH (ref 7–18)
BUN SERPL-MCNC: 65 MG/DL — HIGH (ref 7–18)
CALCIUM SERPL-MCNC: 7.8 MG/DL — LOW (ref 8.4–10.5)
CALCIUM SERPL-MCNC: 7.8 MG/DL — LOW (ref 8.4–10.5)
CHLORIDE SERPL-SCNC: 104 MMOL/L — SIGNIFICANT CHANGE UP (ref 96–108)
CHLORIDE SERPL-SCNC: 104 MMOL/L — SIGNIFICANT CHANGE UP (ref 96–108)
CO2 SERPL-SCNC: 23 MMOL/L — SIGNIFICANT CHANGE UP (ref 22–31)
CO2 SERPL-SCNC: 23 MMOL/L — SIGNIFICANT CHANGE UP (ref 22–31)
CREAT SERPL-MCNC: 12.9 MG/DL — HIGH (ref 0.5–1.3)
CREAT SERPL-MCNC: 12.9 MG/DL — HIGH (ref 0.5–1.3)
EGFR: 3 ML/MIN/1.73M2 — LOW
EGFR: 3 ML/MIN/1.73M2 — LOW
EOSINOPHIL # BLD AUTO: 0.33 K/UL — SIGNIFICANT CHANGE UP (ref 0–0.5)
EOSINOPHIL # BLD AUTO: 0.33 K/UL — SIGNIFICANT CHANGE UP (ref 0–0.5)
EOSINOPHIL NFR BLD AUTO: 4 % — SIGNIFICANT CHANGE UP (ref 0–6)
EOSINOPHIL NFR BLD AUTO: 4 % — SIGNIFICANT CHANGE UP (ref 0–6)
GLUCOSE BLDC GLUCOMTR-MCNC: 117 MG/DL — HIGH (ref 70–99)
GLUCOSE BLDC GLUCOMTR-MCNC: 117 MG/DL — HIGH (ref 70–99)
GLUCOSE BLDC GLUCOMTR-MCNC: 136 MG/DL — HIGH (ref 70–99)
GLUCOSE BLDC GLUCOMTR-MCNC: 136 MG/DL — HIGH (ref 70–99)
GLUCOSE BLDC GLUCOMTR-MCNC: 146 MG/DL — HIGH (ref 70–99)
GLUCOSE BLDC GLUCOMTR-MCNC: 146 MG/DL — HIGH (ref 70–99)
GLUCOSE BLDC GLUCOMTR-MCNC: 64 MG/DL — LOW (ref 70–99)
GLUCOSE BLDC GLUCOMTR-MCNC: 64 MG/DL — LOW (ref 70–99)
GLUCOSE BLDC GLUCOMTR-MCNC: 95 MG/DL — SIGNIFICANT CHANGE UP (ref 70–99)
GLUCOSE SERPL-MCNC: 67 MG/DL — LOW (ref 70–99)
GLUCOSE SERPL-MCNC: 67 MG/DL — LOW (ref 70–99)
HCT VFR BLD CALC: 32.1 % — LOW (ref 34.5–45)
HCT VFR BLD CALC: 32.1 % — LOW (ref 34.5–45)
HGB BLD-MCNC: 10.3 G/DL — LOW (ref 11.5–15.5)
HGB BLD-MCNC: 10.3 G/DL — LOW (ref 11.5–15.5)
IMM GRANULOCYTES NFR BLD AUTO: 0.6 % — SIGNIFICANT CHANGE UP (ref 0–0.9)
IMM GRANULOCYTES NFR BLD AUTO: 0.6 % — SIGNIFICANT CHANGE UP (ref 0–0.9)
LYMPHOCYTES # BLD AUTO: 2.14 K/UL — SIGNIFICANT CHANGE UP (ref 1–3.3)
LYMPHOCYTES # BLD AUTO: 2.14 K/UL — SIGNIFICANT CHANGE UP (ref 1–3.3)
LYMPHOCYTES # BLD AUTO: 25.9 % — SIGNIFICANT CHANGE UP (ref 13–44)
LYMPHOCYTES # BLD AUTO: 25.9 % — SIGNIFICANT CHANGE UP (ref 13–44)
MAGNESIUM SERPL-MCNC: 2.8 MG/DL — HIGH (ref 1.6–2.6)
MAGNESIUM SERPL-MCNC: 2.8 MG/DL — HIGH (ref 1.6–2.6)
MCHC RBC-ENTMCNC: 27.8 PG — SIGNIFICANT CHANGE UP (ref 27–34)
MCHC RBC-ENTMCNC: 27.8 PG — SIGNIFICANT CHANGE UP (ref 27–34)
MCHC RBC-ENTMCNC: 32.1 GM/DL — SIGNIFICANT CHANGE UP (ref 32–36)
MCHC RBC-ENTMCNC: 32.1 GM/DL — SIGNIFICANT CHANGE UP (ref 32–36)
MCV RBC AUTO: 86.8 FL — SIGNIFICANT CHANGE UP (ref 80–100)
MCV RBC AUTO: 86.8 FL — SIGNIFICANT CHANGE UP (ref 80–100)
MONOCYTES # BLD AUTO: 0.64 K/UL — SIGNIFICANT CHANGE UP (ref 0–0.9)
MONOCYTES # BLD AUTO: 0.64 K/UL — SIGNIFICANT CHANGE UP (ref 0–0.9)
MONOCYTES NFR BLD AUTO: 7.8 % — SIGNIFICANT CHANGE UP (ref 2–14)
MONOCYTES NFR BLD AUTO: 7.8 % — SIGNIFICANT CHANGE UP (ref 2–14)
NEUTROPHILS # BLD AUTO: 5.05 K/UL — SIGNIFICANT CHANGE UP (ref 1.8–7.4)
NEUTROPHILS # BLD AUTO: 5.05 K/UL — SIGNIFICANT CHANGE UP (ref 1.8–7.4)
NEUTROPHILS NFR BLD AUTO: 61.2 % — SIGNIFICANT CHANGE UP (ref 43–77)
NEUTROPHILS NFR BLD AUTO: 61.2 % — SIGNIFICANT CHANGE UP (ref 43–77)
NRBC # BLD: 0 /100 WBCS — SIGNIFICANT CHANGE UP (ref 0–0)
NRBC # BLD: 0 /100 WBCS — SIGNIFICANT CHANGE UP (ref 0–0)
PHOSPHATE SERPL-MCNC: 10.4 MG/DL — SIGNIFICANT CHANGE UP (ref 2.5–4.5)
PHOSPHATE SERPL-MCNC: 10.4 MG/DL — SIGNIFICANT CHANGE UP (ref 2.5–4.5)
PLATELET # BLD AUTO: 247 K/UL — SIGNIFICANT CHANGE UP (ref 150–400)
PLATELET # BLD AUTO: 247 K/UL — SIGNIFICANT CHANGE UP (ref 150–400)
POTASSIUM SERPL-MCNC: 4.4 MMOL/L — SIGNIFICANT CHANGE UP (ref 3.5–5.3)
POTASSIUM SERPL-MCNC: 4.4 MMOL/L — SIGNIFICANT CHANGE UP (ref 3.5–5.3)
POTASSIUM SERPL-SCNC: 4.4 MMOL/L — SIGNIFICANT CHANGE UP (ref 3.5–5.3)
POTASSIUM SERPL-SCNC: 4.4 MMOL/L — SIGNIFICANT CHANGE UP (ref 3.5–5.3)
PROT SERPL-MCNC: 6 G/DL — SIGNIFICANT CHANGE UP (ref 6–8.3)
PROT SERPL-MCNC: 6 G/DL — SIGNIFICANT CHANGE UP (ref 6–8.3)
RBC # BLD: 3.7 M/UL — LOW (ref 3.8–5.2)
RBC # BLD: 3.7 M/UL — LOW (ref 3.8–5.2)
RBC # FLD: 14.3 % — SIGNIFICANT CHANGE UP (ref 10.3–14.5)
RBC # FLD: 14.3 % — SIGNIFICANT CHANGE UP (ref 10.3–14.5)
SODIUM SERPL-SCNC: 138 MMOL/L — SIGNIFICANT CHANGE UP (ref 135–145)
SODIUM SERPL-SCNC: 138 MMOL/L — SIGNIFICANT CHANGE UP (ref 135–145)
WBC # BLD: 8.25 K/UL — SIGNIFICANT CHANGE UP (ref 3.8–10.5)
WBC # BLD: 8.25 K/UL — SIGNIFICANT CHANGE UP (ref 3.8–10.5)
WBC # FLD AUTO: 8.25 K/UL — SIGNIFICANT CHANGE UP (ref 3.8–10.5)
WBC # FLD AUTO: 8.25 K/UL — SIGNIFICANT CHANGE UP (ref 3.8–10.5)

## 2023-12-09 PROCEDURE — 99233 SBSQ HOSP IP/OBS HIGH 50: CPT

## 2023-12-09 RX ORDER — METOLAZONE 5 MG/1
0 TABLET ORAL
Qty: 0 | Refills: 0 | DISCHARGE

## 2023-12-09 RX ORDER — HYDRALAZINE HCL 50 MG
50 TABLET ORAL
Refills: 0 | Status: DISCONTINUED | OUTPATIENT
Start: 2023-12-09 | End: 2023-12-10

## 2023-12-09 RX ORDER — METOPROLOL TARTRATE 50 MG
0 TABLET ORAL
Qty: 0 | Refills: 0 | DISCHARGE

## 2023-12-09 RX ORDER — TAMSULOSIN HYDROCHLORIDE 0.4 MG/1
0 CAPSULE ORAL
Qty: 0 | Refills: 0 | DISCHARGE

## 2023-12-09 RX ORDER — ROSUVASTATIN CALCIUM 5 MG/1
0 TABLET ORAL
Qty: 0 | Refills: 0 | DISCHARGE

## 2023-12-09 RX ORDER — HYDRALAZINE HCL 50 MG
50 TABLET ORAL THREE TIMES A DAY
Refills: 0 | Status: DISCONTINUED | OUTPATIENT
Start: 2023-12-09 | End: 2023-12-09

## 2023-12-09 RX ORDER — ERYTHROPOIETIN 10000 [IU]/ML
4000 INJECTION, SOLUTION INTRAVENOUS; SUBCUTANEOUS ONCE
Refills: 0 | Status: COMPLETED | OUTPATIENT
Start: 2023-12-09 | End: 2023-12-09

## 2023-12-09 RX ORDER — LABETALOL HCL 100 MG
10 TABLET ORAL ONCE
Refills: 0 | Status: COMPLETED | OUTPATIENT
Start: 2023-12-09 | End: 2023-12-09

## 2023-12-09 RX ORDER — GABAPENTIN 400 MG/1
0 CAPSULE ORAL
Qty: 0 | Refills: 0 | DISCHARGE

## 2023-12-09 RX ORDER — DEXTROSE 50 % IN WATER 50 %
50 SYRINGE (ML) INTRAVENOUS ONCE
Refills: 0 | Status: DISCONTINUED | OUTPATIENT
Start: 2023-12-09 | End: 2023-12-10

## 2023-12-09 RX ORDER — ACETAMINOPHEN 500 MG
650 TABLET ORAL EVERY 6 HOURS
Refills: 0 | Status: DISCONTINUED | OUTPATIENT
Start: 2023-12-09 | End: 2023-12-10

## 2023-12-09 RX ORDER — AMLODIPINE BESYLATE 2.5 MG/1
10 TABLET ORAL EVERY 24 HOURS
Refills: 0 | Status: DISCONTINUED | OUTPATIENT
Start: 2023-12-09 | End: 2023-12-09

## 2023-12-09 RX ORDER — TRAZODONE HCL 50 MG
0 TABLET ORAL
Qty: 0 | Refills: 0 | DISCHARGE

## 2023-12-09 RX ORDER — INFLUENZA VIRUS VACCINE 15; 15; 15; 15 UG/.5ML; UG/.5ML; UG/.5ML; UG/.5ML
0.5 SUSPENSION INTRAMUSCULAR ONCE
Refills: 0 | Status: DISCONTINUED | OUTPATIENT
Start: 2023-12-09 | End: 2023-12-10

## 2023-12-09 RX ADMIN — Medication 1 TABLET(S): at 12:35

## 2023-12-09 RX ADMIN — Medication 325 MILLIGRAM(S): at 12:35

## 2023-12-09 RX ADMIN — Medication 650 MILLIGRAM(S): at 06:05

## 2023-12-09 RX ADMIN — HEPARIN SODIUM 5000 UNIT(S): 5000 INJECTION INTRAVENOUS; SUBCUTANEOUS at 06:06

## 2023-12-09 RX ADMIN — Medication 10 MILLIGRAM(S): at 00:51

## 2023-12-09 RX ADMIN — ATORVASTATIN CALCIUM 80 MILLIGRAM(S): 80 TABLET, FILM COATED ORAL at 21:26

## 2023-12-09 RX ADMIN — AMLODIPINE BESYLATE 10 MILLIGRAM(S): 2.5 TABLET ORAL at 04:04

## 2023-12-09 RX ADMIN — Medication 25 MILLIGRAM(S): at 06:06

## 2023-12-09 RX ADMIN — Medication 50 MILLIGRAM(S): at 18:57

## 2023-12-09 RX ADMIN — Medication 10 MILLIGRAM(S): at 06:06

## 2023-12-09 RX ADMIN — INSULIN GLARGINE 17 UNIT(S): 100 INJECTION, SOLUTION SUBCUTANEOUS at 21:26

## 2023-12-09 RX ADMIN — Medication 50 MILLIGRAM(S): at 06:06

## 2023-12-09 RX ADMIN — Medication 650 MILLIGRAM(S): at 22:40

## 2023-12-09 RX ADMIN — Medication 650 MILLIGRAM(S): at 20:17

## 2023-12-09 RX ADMIN — SEVELAMER CARBONATE 1600 MILLIGRAM(S): 2400 POWDER, FOR SUSPENSION ORAL at 21:26

## 2023-12-09 RX ADMIN — GABAPENTIN 300 MILLIGRAM(S): 400 CAPSULE ORAL at 12:37

## 2023-12-09 RX ADMIN — ERYTHROPOIETIN 4000 UNIT(S): 10000 INJECTION, SOLUTION INTRAVENOUS; SUBCUTANEOUS at 16:42

## 2023-12-09 RX ADMIN — SEVELAMER CARBONATE 1600 MILLIGRAM(S): 2400 POWDER, FOR SUSPENSION ORAL at 06:06

## 2023-12-09 RX ADMIN — SEVELAMER CARBONATE 1600 MILLIGRAM(S): 2400 POWDER, FOR SUSPENSION ORAL at 14:12

## 2023-12-09 NOTE — PROGRESS NOTE ADULT - PROBLEM SELECTOR PLAN 4
- hx of HTN on metoprolol and amlodipine  - now in hypertensive emergency 217/120  - s/p hydral 50mg po in the ED, did not improve   - s/p labetalol 10 x1   - pt stated that her BP meds changed recently,  - start hydral 50 TID   - will continue to monitor and restart home meds amlodipine and metoprolol  - TO CONFIRM MED REC - hx of HTN on metoprolol and amlodipine  - now in hypertensive emergency 217/120  - s/p hydral 50mg po in the ED, did not improve   - s/p labetalol 10 x1   - pt stated that her BP meds changed recently,  - DC amlodipine, MED REC:  on hydralazine 50mg BID, Metoprolol succinate 25, Metolazone 5 BID   - Monitor BP

## 2023-12-09 NOTE — PROGRESS NOTE ADULT - SUBJECTIVE AND OBJECTIVE BOX
PGY-1 Progress Note discussed with attending    PAGER #: [[phone]] TILL 5:00 PM  PLEASE CONTACT ON CALL TEAM:  - On Call Team (Please refer to Aline) FROM 5:00 PM - 8:30PM  - Nightfloat Team FROM 8:30 -7:30 AM      INTERVAL HPI/OVERNIGHT EVENTS: patient seen and examined. Patient feels better denies any acute complaints. Diarrhea has resolved. Denies any headaches, abdominal pain, N/V. Patient has a right permcath placed 2 years ago.      REVIEW OF SYSTEMS:  same as HPI      Vital Signs Last 24 Hrs  T(C): 37.5 (09 Dec 2023 03:35), Max: 37.7 (09 Dec 2023 02:18)  T(F): 99.5 (09 Dec 2023 03:35), Max: 99.8 (09 Dec 2023 02:18)  HR: 90 (09 Dec 2023 08:14) (90 - 116)  BP: 119/73 (09 Dec 2023 08:14) (119/73 - 217/120)  BP(mean): --  RR: 16 (09 Dec 2023 03:35) (16 - 20)  SpO2: 98% (09 Dec 2023 03:35) (97% - 99%)    Parameters below as of 09 Dec 2023 03:35  Patient On (Oxygen Delivery Method): nasal cannula  O2 Flow (L/min): 2      PHYSICAL EXAMINATION:  GENERAL: NAD  HEAD:  Atraumatic, Normocephalic  EYES:  conjunctiva and sclera clear  NECK: Supple, No JVD, Normal thyroid  CHEST/LUNG: Decreased breath sounds ; No rales, rhonchi, wheezing, or rubs  HEART: Regular rate and rhythm; No murmurs, rubs, or gallops  ABDOMEN: Soft, Nontender, Nondistended; Bowel sounds present  NERVOUS SYSTEM:  Alert & Oriented X3,    EXTREMITIES:  2+ Peripheral Pulses, No clubbing, cyanosis, or edema  SKIN: warm dry, Right permcath                           10.3   8.25  )-----------( 247      ( 09 Dec 2023 06:29 )             32.1     12-09    138  |  104  |  65<H>  ----------------------------<  67<L>  4.4   |  23  |  12.90<H>    Ca    7.8<L>      09 Dec 2023 06:29  Phos  10.4     12-09  Mg     2.8     12-09    TPro  6.0  /  Alb  2.2<L>  /  TBili  0.3  /  DBili  x   /  AST  14  /  ALT  12  /  AlkPhos  47  12-09    LIVER FUNCTIONS - ( 09 Dec 2023 06:29 )  Alb: 2.2 g/dL / Pro: 6.0 g/dL / ALK PHOS: 47 U/L / ALT: 12 U/L DA / AST: 14 U/L / GGT: x               PT/INR - ( 08 Dec 2023 17:00 )   PT: 10.6 sec;   INR: 0.93 ratio         PTT - ( 08 Dec 2023 17:00 )  PTT:36.0 sec    CAPILLARY BLOOD GLUCOSE      RADIOLOGY & ADDITIONAL TESTS:                   PGY-1 Progress Note discussed with attending    PAGER #: [[phone]] TILL 5:00 PM  PLEASE CONTACT ON CALL TEAM:  - On Call Team (Please refer to Aline) FROM 5:00 PM - 8:30PM  - Nightfloat Team FROM 8:30 -7:30 AM      INTERVAL HPI/OVERNIGHT EVENTS: patient seen and examined. Patient feels better denies any acute complaints. Diarrhea has resolved. Denies any headaches, abdominal pain, N/V. Patient has a right permcath placed 2 years ago.      REVIEW OF SYSTEMS:  same as HPI      Vital Signs Last 24 Hrs  T(C): 37.5 (09 Dec 2023 03:35), Max: 37.7 (09 Dec 2023 02:18)  T(F): 99.5 (09 Dec 2023 03:35), Max: 99.8 (09 Dec 2023 02:18)  HR: 90 (09 Dec 2023 08:14) (90 - 116)  BP: 119/73 (09 Dec 2023 08:14) (119/73 - 217/120)  BP(mean): --  RR: 16 (09 Dec 2023 03:35) (16 - 20)  SpO2: 98% (09 Dec 2023 03:35) (97% - 99%)    Parameters below as of 09 Dec 2023 03:35  Patient On (Oxygen Delivery Method): nasal cannula  O2 Flow (L/min): 2      PHYSICAL EXAMINATION:  GENERAL: NAD  HEAD:  Atraumatic, Normocephalic  EYES:  conjunctiva and sclera clear  NECK: Supple, No JVD, Normal thyroid  CHEST/LUNG: Decreased breath sounds ; No rales, rhonchi, wheezing, or rubs  HEART: Regular rate and rhythm; No murmurs, rubs, or gallops  ABDOMEN: Soft, Nontender, Nondistended; Bowel sounds present  NERVOUS SYSTEM:  Alert & Oriented X3,    EXTREMITIES:  2+ Peripheral Pulses, No clubbing, cyanosis, or edema  SKIN: warm dry, Right permcath                           10.3   8.25  )-----------( 247      ( 09 Dec 2023 06:29 )             32.1     12-09    138  |  104  |  65<H>  ----------------------------<  67<L>  4.4   |  23  |  12.90<H>    Ca    7.8<L>      09 Dec 2023 06:29  Phos  10.4     12-09  Mg     2.8     12-09    TPro  6.0  /  Alb  2.2<L>  /  TBili  0.3  /  DBili  x   /  AST  14  /  ALT  12  /  AlkPhos  47  12-09    LIVER FUNCTIONS - ( 09 Dec 2023 06:29 )  Alb: 2.2 g/dL / Pro: 6.0 g/dL / ALK PHOS: 47 U/L / ALT: 12 U/L DA / AST: 14 U/L / GGT: x               PT/INR - ( 08 Dec 2023 17:00 )   PT: 10.6 sec;   INR: 0.93 ratio         PTT - ( 08 Dec 2023 17:00 )  PTT:36.0 sec    CAPILLARY BLOOD GLUCOSE      RADIOLOGY & ADDITIONAL TESTS:

## 2023-12-09 NOTE — PATIENT PROFILE ADULT - FALL HARM RISK - HARM RISK INTERVENTIONS
Assistance with ambulation/Assistance OOB with selected safe patient handling equipment/Communicate Risk of Fall with Harm to all staff/Discuss with provider need for PT consult/Monitor gait and stability/Provide patient with walking aids - walker, cane, crutches/Reinforce activity limits and safety measures with patient and family/Sit up slowly, dangle for a short time, stand at bedside before walking/Tailored Fall Risk Interventions/Visual Cue: Yellow wristband and red socks/Bed in lowest position, wheels locked, appropriate side rails in place/Call bell, personal items and telephone in reach/Instruct patient to call for assistance before getting out of bed or chair/Non-slip footwear when patient is out of bed/[place] to call system/Physically safe environment - no spills, clutter or unnecessary equipment/Purposeful Proactive Rounding/Room/bathroom lighting operational, light cord in reach Assistance with ambulation/Assistance OOB with selected safe patient handling equipment/Communicate Risk of Fall with Harm to all staff/Discuss with provider need for PT consult/Monitor gait and stability/Provide patient with walking aids - walker, cane, crutches/Reinforce activity limits and safety measures with patient and family/Sit up slowly, dangle for a short time, stand at bedside before walking/Tailored Fall Risk Interventions/Visual Cue: Yellow wristband and red socks/Bed in lowest position, wheels locked, appropriate side rails in place/Call bell, personal items and telephone in reach/Instruct patient to call for assistance before getting out of bed or chair/Non-slip footwear when patient is out of bed/[place] to call system/Physically safe environment - no spills, clutter or unnecessary equipment/Purposeful Proactive Rounding/Room/bathroom lighting operational, light cord in reach

## 2023-12-09 NOTE — PROGRESS NOTE ADULT - PROBLEM SELECTOR PLAN 3
- Hx of DM on lantus 17 at bedtime novolog 10u TID  - c/w home regimen as last POCT 326  - ISS fs, qhs and before meals - Hx of DM on Lantus 17 at bedtime Novolog 10u TID  - c/w home regimen as last POCT 326  - ISS fs, qhs and before meals  - risk of hypoglycemia, needs dialysis  - will check q1 for hypoglycemia

## 2023-12-09 NOTE — PATIENT PROFILE ADULT - FUNCTIONAL ASSESSMENT - BASIC MOBILITY 6.
3-calculated by average/Not able to assess (calculate score using [facility] averaging method) 3-calculated by average/Not able to assess (calculate score using [facility] averaging method)

## 2023-12-09 NOTE — CONSULT NOTE ADULT - SUBJECTIVE AND OBJECTIVE BOX
Patient is a 53y Female whom presented to the hospital with  MISSING   HD  SESSIONS     RECENTLY  STARTED  HD  AT  [facility]  HD  CENTER,  MISSED  2  SESSIONS,   SAYS  SHE  HAD  A FALL  AT HOME  AND  WAS  NOT FEELING  WELL   THEN  SHE  SAYS  SHE  WAS HAVING  DIARRHEA  SO  SHE  COULD  NOT  COME     WAS ADVISED  TO  COME  HERE AS HER  LAST HD  WAS ON  FRIDAY    SEEN  TODAY     FEELS BETTER,       HAS NO  COMPLAINTS  AT THE TIME OF  EXAM    PAST MEDICAL & SURGICAL HISTORY:  Sarcoidosis      Diabetes      Hypertension      Hyperlipidemia      Chronic kidney disease (CKD), stage III (moderate)      Seizure disorder      No significant past surgical history        No Known Allergies    Home Medications Reviewed  Hospital Medications:   MEDICATIONS  (STANDING):  atorvastatin 80 milliGRAM(s) Oral at bedtime  dextrose 50% Injectable 50 milliLiter(s) IV Push once  epoetin suzette (EPOGEN) Injectable 4000 Unit(s) IV Push <User Schedule>  ergocalciferol 95165 Unit(s) Oral every week  ferrous    sulfate 325 milliGRAM(s) Oral daily  gabapentin 300 milliGRAM(s) Oral daily  heparin   Injectable 5000 Unit(s) SubCutaneous every 8 hours  hydrALAZINE 50 milliGRAM(s) Oral two times a day  influenza   Vaccine 0.5 milliLiter(s) IntraMuscular once  insulin glargine Injectable (LANTUS) 17 Unit(s) SubCutaneous at bedtime  insulin lispro (ADMELOG) corrective regimen sliding scale   SubCutaneous at bedtime  insulin lispro (ADMELOG) corrective regimen sliding scale   SubCutaneous three times a day before meals  insulin lispro Injectable (ADMELOG) 10 Unit(s) SubCutaneous three times a day before meals  lactobacillus acidophilus 1 Tablet(s) Oral daily  metolazone 5 milliGRAM(s) Oral <User Schedule>  metoprolol succinate ER 25 milliGRAM(s) Oral daily  predniSONE   Tablet 10 milliGRAM(s) Oral daily  sevelamer carbonate 1600 milliGRAM(s) Oral three times a day    SOCIAL HISTORY:  Denies ETOh,Smoking,   FAMILY HISTORY:  Family history of diabetes mellitus in first degree relative      REVIEW OF SYSTEMS:  HAS NO   FEVER  CHILLS   NO   SOB  OR  COUGH   NO CH  PAIN  / PALPITATIONS   APPETITE IS OK  NO  N/V  OR  ABD  PAIN  NO  DIARRHEA  VOIDING  WELL   NO LEG  SWELLING    VITALS:  T(F): 98 (12-09-23 @ 12:51), Max: 99.8 (12-09-23 @ 02:18)  HR: 88 (12-09-23 @ 12:51)  BP: 135/82 (12-09-23 @ 12:51)  RR: 18 (12-09-23 @ 12:51)  SpO2: 100% (12-09-23 @ 12:51)  Wt(kg): --      Weight (kg): 47.7 (12-08 @ 15:45)  PHYSICAL EXAM:  Constitutional: NAD  HEENT: CONJ  PINK  Neck: No JVD  Respiratory: CTAB, NO  CRACKLES  Cardiovascular: S1, S2, RRR  Gastrointestinal: BS+, soft, NT/ND  Extremities:  No peripheral edema  Neurological: A/O x 3,   : . No rai.     Vascular Access:  R  IJ PC    LABS:  12-09    138  |  104  |  65<H>  ----------------------------<  67<L>  4.4   |  23  |  12.90<H>    Ca    7.8<L>      09 Dec 2023 06:29  Phos  10.4     12-09  Mg     2.8     12-09    TPro  6.0  /  Alb  2.2<L>  /  TBili  0.3  /  DBili      /  AST  14  /  ALT  12  /  AlkPhos  47  12-09    Creatinine Trend: 12.90 <--, 12.80 <--                        10.3   8.25  )-----------( 247      ( 09 Dec 2023 06:29 )             32.1     Urine Studies:  Urinalysis Basic - ( 09 Dec 2023 06:29 )    Color:  / Appearance:  / SG:  / pH:   Gluc: 67 mg/dL / Ketone:   / Bili:  / Urobili:    Blood:  / Protein:  / Nitrite:    Leuk Esterase:  / RBC:  / WBC    Sq Epi:  / Non Sq Epi:  / Bacteria:         RADIOLOGY & ADDITIONAL STUDIES:                   Patient is a 53y Female whom presented to the hospital with  MISSING   HD  SESSIONS     RECENTLY  STARTED  HD  AT  [facility]  HD  CENTER,  MISSED  2  SESSIONS,   SAYS  SHE  HAD  A FALL  AT HOME  AND  WAS  NOT FEELING  WELL   THEN  SHE  SAYS  SHE  WAS HAVING  DIARRHEA  SO  SHE  COULD  NOT  COME     WAS ADVISED  TO  COME  HERE AS HER  LAST HD  WAS ON  FRIDAY    SEEN  TODAY     FEELS BETTER,       HAS NO  COMPLAINTS  AT THE TIME OF  EXAM    PAST MEDICAL & SURGICAL HISTORY:  Sarcoidosis      Diabetes      Hypertension      Hyperlipidemia      Chronic kidney disease (CKD), stage III (moderate)      Seizure disorder      No significant past surgical history        No Known Allergies    Home Medications Reviewed  Hospital Medications:   MEDICATIONS  (STANDING):  atorvastatin 80 milliGRAM(s) Oral at bedtime  dextrose 50% Injectable 50 milliLiter(s) IV Push once  epoetin suzette (EPOGEN) Injectable 4000 Unit(s) IV Push <User Schedule>  ergocalciferol 56035 Unit(s) Oral every week  ferrous    sulfate 325 milliGRAM(s) Oral daily  gabapentin 300 milliGRAM(s) Oral daily  heparin   Injectable 5000 Unit(s) SubCutaneous every 8 hours  hydrALAZINE 50 milliGRAM(s) Oral two times a day  influenza   Vaccine 0.5 milliLiter(s) IntraMuscular once  insulin glargine Injectable (LANTUS) 17 Unit(s) SubCutaneous at bedtime  insulin lispro (ADMELOG) corrective regimen sliding scale   SubCutaneous at bedtime  insulin lispro (ADMELOG) corrective regimen sliding scale   SubCutaneous three times a day before meals  insulin lispro Injectable (ADMELOG) 10 Unit(s) SubCutaneous three times a day before meals  lactobacillus acidophilus 1 Tablet(s) Oral daily  metolazone 5 milliGRAM(s) Oral <User Schedule>  metoprolol succinate ER 25 milliGRAM(s) Oral daily  predniSONE   Tablet 10 milliGRAM(s) Oral daily  sevelamer carbonate 1600 milliGRAM(s) Oral three times a day    SOCIAL HISTORY:  Denies ETOh,Smoking,   FAMILY HISTORY:  Family history of diabetes mellitus in first degree relative      REVIEW OF SYSTEMS:  HAS NO   FEVER  CHILLS   NO   SOB  OR  COUGH   NO CH  PAIN  / PALPITATIONS   APPETITE IS OK  NO  N/V  OR  ABD  PAIN  NO  DIARRHEA  VOIDING  WELL   NO LEG  SWELLING    VITALS:  T(F): 98 (12-09-23 @ 12:51), Max: 99.8 (12-09-23 @ 02:18)  HR: 88 (12-09-23 @ 12:51)  BP: 135/82 (12-09-23 @ 12:51)  RR: 18 (12-09-23 @ 12:51)  SpO2: 100% (12-09-23 @ 12:51)  Wt(kg): --      Weight (kg): 47.7 (12-08 @ 15:45)  PHYSICAL EXAM:  Constitutional: NAD  HEENT: CONJ  PINK  Neck: No JVD  Respiratory: CTAB, NO  CRACKLES  Cardiovascular: S1, S2, RRR  Gastrointestinal: BS+, soft, NT/ND  Extremities:  No peripheral edema  Neurological: A/O x 3,   : . No rai.     Vascular Access:  R  IJ PC    LABS:  12-09    138  |  104  |  65<H>  ----------------------------<  67<L>  4.4   |  23  |  12.90<H>    Ca    7.8<L>      09 Dec 2023 06:29  Phos  10.4     12-09  Mg     2.8     12-09    TPro  6.0  /  Alb  2.2<L>  /  TBili  0.3  /  DBili      /  AST  14  /  ALT  12  /  AlkPhos  47  12-09    Creatinine Trend: 12.90 <--, 12.80 <--                        10.3   8.25  )-----------( 247      ( 09 Dec 2023 06:29 )             32.1     Urine Studies:  Urinalysis Basic - ( 09 Dec 2023 06:29 )    Color:  / Appearance:  / SG:  / pH:   Gluc: 67 mg/dL / Ketone:   / Bili:  / Urobili:    Blood:  / Protein:  / Nitrite:    Leuk Esterase:  / RBC:  / WBC    Sq Epi:  / Non Sq Epi:  / Bacteria:         RADIOLOGY & ADDITIONAL STUDIES:

## 2023-12-09 NOTE — PROGRESS NOTE ADULT - PROBLEM SELECTOR PLAN 1
-  presents to the ED after missing Monday and Wednesday dialysis.   - She was directed to come here for inpatient HD.   - Vitals significant for 217/120.  - Labs sig for K 5.5, BUN/Cr 65/12.8, POCT 187 > 326.   Admitted for in-patient HD  - Dr. Estrada consulted, will go for HD today

## 2023-12-09 NOTE — PROGRESS NOTE ADULT - ASSESSMENT
54 y/o F w/ PMHx of ESRD on HD M/W/F, T2DM, HTN, HLD, sarcoidosis, anemia, seizure disorder (no longer on meds) presents to the ED after missing Monday and Wednesday dialysis. She was directed to come here for inpatient HD. Vitals significant for 217/120. Labs sig for K 5.5, BUN/Cr 65/12.8, POCT 187 > 326. s/p hydral 100mg in the ED. Admitted for in-patient HD.    52 y/o F w/ PMHx of ESRD on HD M/W/F, T2DM, HTN, HLD, sarcoidosis, anemia, seizure disorder (no longer on meds) presents to the ED after missing Monday and Wednesday dialysis. She was directed to come here for inpatient HD. Vitals significant for 217/120. Labs sig for K 5.5, BUN/Cr 65/12.8, POCT 187 > 326. s/p hydral 100mg in the ED. Admitted for in-patient HD.

## 2023-12-09 NOTE — CONSULT NOTE ADULT - ASSESSMENT
A/P   ESRD  ON  HD   MISSED  2  HD  SESSION,  REASON ??    FOR HD TODAY     CONSENT IN  CHART    WILL   DO  HER  3  HRS  TODAY ,  2K  BATH     UF GOAL 1.5  LIT    PRESTON  WITH HD     CAN   BE  D/C HOME  POST  HD     EDUCATED  THE PT ON  THE  RISKS  OF MISSING  HD    ADV  TO GO REGULARLY FOR  HER TREATMENTS

## 2023-12-09 NOTE — PROGRESS NOTE ADULT - ATTENDING COMMENTS
53F PMH ESRD on HD MWF, DM2, sarcoidosis, anemia, seizure disorder not on meds, p/w cc missed HD found with HTN emergency which resolved with IV hydralazine.    AP:  HTN emergency  Missed HD  ESRD on HD  hyperkalemia  medication non-compliance  DM2  HTN  HLD    -discussed with nephro: HD this morning  -c/w EPO, sodium bicarb, sevelamer  -HTN resolved with IV hydral in ED  -resume home meds: hydralazine, toprol, metolazone  -on lantus 17 and novolog 10 tid at home, had episode of hypoglycemia today  -holding home novolog ... c/w ISS  -c/w prednison for sarcoidosis  -resume home statin  -dvt ppx

## 2023-12-10 ENCOUNTER — TRANSCRIPTION ENCOUNTER (OUTPATIENT)
Age: 53
End: 2023-12-10

## 2023-12-10 VITALS
TEMPERATURE: 98 F | OXYGEN SATURATION: 99 % | SYSTOLIC BLOOD PRESSURE: 156 MMHG | RESPIRATION RATE: 18 BRPM | HEART RATE: 88 BPM | DIASTOLIC BLOOD PRESSURE: 75 MMHG

## 2023-12-10 LAB
ANION GAP SERPL CALC-SCNC: 8 MMOL/L — SIGNIFICANT CHANGE UP (ref 5–17)
ANION GAP SERPL CALC-SCNC: 8 MMOL/L — SIGNIFICANT CHANGE UP (ref 5–17)
BASOPHILS # BLD AUTO: 0.03 K/UL — SIGNIFICANT CHANGE UP (ref 0–0.2)
BASOPHILS # BLD AUTO: 0.03 K/UL — SIGNIFICANT CHANGE UP (ref 0–0.2)
BASOPHILS NFR BLD AUTO: 0.5 % — SIGNIFICANT CHANGE UP (ref 0–2)
BASOPHILS NFR BLD AUTO: 0.5 % — SIGNIFICANT CHANGE UP (ref 0–2)
BUN SERPL-MCNC: 25 MG/DL — HIGH (ref 7–18)
BUN SERPL-MCNC: 25 MG/DL — HIGH (ref 7–18)
CALCIUM SERPL-MCNC: 7.7 MG/DL — LOW (ref 8.4–10.5)
CALCIUM SERPL-MCNC: 7.7 MG/DL — LOW (ref 8.4–10.5)
CHLORIDE SERPL-SCNC: 102 MMOL/L — SIGNIFICANT CHANGE UP (ref 96–108)
CHLORIDE SERPL-SCNC: 102 MMOL/L — SIGNIFICANT CHANGE UP (ref 96–108)
CO2 SERPL-SCNC: 27 MMOL/L — SIGNIFICANT CHANGE UP (ref 22–31)
CO2 SERPL-SCNC: 27 MMOL/L — SIGNIFICANT CHANGE UP (ref 22–31)
CREAT SERPL-MCNC: 6.37 MG/DL — HIGH (ref 0.5–1.3)
CREAT SERPL-MCNC: 6.37 MG/DL — HIGH (ref 0.5–1.3)
EGFR: 7 ML/MIN/1.73M2 — LOW
EGFR: 7 ML/MIN/1.73M2 — LOW
EOSINOPHIL # BLD AUTO: 0.27 K/UL — SIGNIFICANT CHANGE UP (ref 0–0.5)
EOSINOPHIL # BLD AUTO: 0.27 K/UL — SIGNIFICANT CHANGE UP (ref 0–0.5)
EOSINOPHIL NFR BLD AUTO: 4.8 % — SIGNIFICANT CHANGE UP (ref 0–6)
EOSINOPHIL NFR BLD AUTO: 4.8 % — SIGNIFICANT CHANGE UP (ref 0–6)
GLUCOSE BLDC GLUCOMTR-MCNC: 132 MG/DL — HIGH (ref 70–99)
GLUCOSE BLDC GLUCOMTR-MCNC: 132 MG/DL — HIGH (ref 70–99)
GLUCOSE BLDC GLUCOMTR-MCNC: 82 MG/DL — SIGNIFICANT CHANGE UP (ref 70–99)
GLUCOSE BLDC GLUCOMTR-MCNC: 82 MG/DL — SIGNIFICANT CHANGE UP (ref 70–99)
GLUCOSE SERPL-MCNC: 90 MG/DL — SIGNIFICANT CHANGE UP (ref 70–99)
GLUCOSE SERPL-MCNC: 90 MG/DL — SIGNIFICANT CHANGE UP (ref 70–99)
HBV SURFACE AG SER-ACNC: SIGNIFICANT CHANGE UP
HBV SURFACE AG SER-ACNC: SIGNIFICANT CHANGE UP
HCT VFR BLD CALC: 29.3 % — LOW (ref 34.5–45)
HCT VFR BLD CALC: 29.3 % — LOW (ref 34.5–45)
HGB BLD-MCNC: 9.3 G/DL — LOW (ref 11.5–15.5)
HGB BLD-MCNC: 9.3 G/DL — LOW (ref 11.5–15.5)
IMM GRANULOCYTES NFR BLD AUTO: 0.7 % — SIGNIFICANT CHANGE UP (ref 0–0.9)
IMM GRANULOCYTES NFR BLD AUTO: 0.7 % — SIGNIFICANT CHANGE UP (ref 0–0.9)
LYMPHOCYTES # BLD AUTO: 2.05 K/UL — SIGNIFICANT CHANGE UP (ref 1–3.3)
LYMPHOCYTES # BLD AUTO: 2.05 K/UL — SIGNIFICANT CHANGE UP (ref 1–3.3)
LYMPHOCYTES # BLD AUTO: 36.2 % — SIGNIFICANT CHANGE UP (ref 13–44)
LYMPHOCYTES # BLD AUTO: 36.2 % — SIGNIFICANT CHANGE UP (ref 13–44)
MAGNESIUM SERPL-MCNC: 2.4 MG/DL — SIGNIFICANT CHANGE UP (ref 1.6–2.6)
MAGNESIUM SERPL-MCNC: 2.4 MG/DL — SIGNIFICANT CHANGE UP (ref 1.6–2.6)
MCHC RBC-ENTMCNC: 27.6 PG — SIGNIFICANT CHANGE UP (ref 27–34)
MCHC RBC-ENTMCNC: 27.6 PG — SIGNIFICANT CHANGE UP (ref 27–34)
MCHC RBC-ENTMCNC: 31.7 GM/DL — LOW (ref 32–36)
MCHC RBC-ENTMCNC: 31.7 GM/DL — LOW (ref 32–36)
MCV RBC AUTO: 86.9 FL — SIGNIFICANT CHANGE UP (ref 80–100)
MCV RBC AUTO: 86.9 FL — SIGNIFICANT CHANGE UP (ref 80–100)
MONOCYTES # BLD AUTO: 0.56 K/UL — SIGNIFICANT CHANGE UP (ref 0–0.9)
MONOCYTES # BLD AUTO: 0.56 K/UL — SIGNIFICANT CHANGE UP (ref 0–0.9)
MONOCYTES NFR BLD AUTO: 9.9 % — SIGNIFICANT CHANGE UP (ref 2–14)
MONOCYTES NFR BLD AUTO: 9.9 % — SIGNIFICANT CHANGE UP (ref 2–14)
NEUTROPHILS # BLD AUTO: 2.71 K/UL — SIGNIFICANT CHANGE UP (ref 1.8–7.4)
NEUTROPHILS # BLD AUTO: 2.71 K/UL — SIGNIFICANT CHANGE UP (ref 1.8–7.4)
NEUTROPHILS NFR BLD AUTO: 47.9 % — SIGNIFICANT CHANGE UP (ref 43–77)
NEUTROPHILS NFR BLD AUTO: 47.9 % — SIGNIFICANT CHANGE UP (ref 43–77)
NRBC # BLD: 0 /100 WBCS — SIGNIFICANT CHANGE UP (ref 0–0)
NRBC # BLD: 0 /100 WBCS — SIGNIFICANT CHANGE UP (ref 0–0)
PHOSPHATE SERPL-MCNC: 6 MG/DL — HIGH (ref 2.5–4.5)
PHOSPHATE SERPL-MCNC: 6 MG/DL — HIGH (ref 2.5–4.5)
PLATELET # BLD AUTO: 213 K/UL — SIGNIFICANT CHANGE UP (ref 150–400)
PLATELET # BLD AUTO: 213 K/UL — SIGNIFICANT CHANGE UP (ref 150–400)
POTASSIUM SERPL-MCNC: 3.5 MMOL/L — SIGNIFICANT CHANGE UP (ref 3.5–5.3)
POTASSIUM SERPL-MCNC: 3.5 MMOL/L — SIGNIFICANT CHANGE UP (ref 3.5–5.3)
POTASSIUM SERPL-SCNC: 3.5 MMOL/L — SIGNIFICANT CHANGE UP (ref 3.5–5.3)
POTASSIUM SERPL-SCNC: 3.5 MMOL/L — SIGNIFICANT CHANGE UP (ref 3.5–5.3)
RBC # BLD: 3.37 M/UL — LOW (ref 3.8–5.2)
RBC # BLD: 3.37 M/UL — LOW (ref 3.8–5.2)
RBC # FLD: 14.1 % — SIGNIFICANT CHANGE UP (ref 10.3–14.5)
RBC # FLD: 14.1 % — SIGNIFICANT CHANGE UP (ref 10.3–14.5)
SODIUM SERPL-SCNC: 137 MMOL/L — SIGNIFICANT CHANGE UP (ref 135–145)
SODIUM SERPL-SCNC: 137 MMOL/L — SIGNIFICANT CHANGE UP (ref 135–145)
WBC # BLD: 5.66 K/UL — SIGNIFICANT CHANGE UP (ref 3.8–10.5)
WBC # BLD: 5.66 K/UL — SIGNIFICANT CHANGE UP (ref 3.8–10.5)
WBC # FLD AUTO: 5.66 K/UL — SIGNIFICANT CHANGE UP (ref 3.8–10.5)
WBC # FLD AUTO: 5.66 K/UL — SIGNIFICANT CHANGE UP (ref 3.8–10.5)

## 2023-12-10 PROCEDURE — 85610 PROTHROMBIN TIME: CPT

## 2023-12-10 PROCEDURE — 80053 COMPREHEN METABOLIC PANEL: CPT

## 2023-12-10 PROCEDURE — 36415 COLL VENOUS BLD VENIPUNCTURE: CPT

## 2023-12-10 PROCEDURE — 82962 GLUCOSE BLOOD TEST: CPT

## 2023-12-10 PROCEDURE — 85025 COMPLETE CBC W/AUTO DIFF WBC: CPT

## 2023-12-10 PROCEDURE — 93005 ELECTROCARDIOGRAM TRACING: CPT

## 2023-12-10 PROCEDURE — 99285 EMERGENCY DEPT VISIT HI MDM: CPT | Mod: 25

## 2023-12-10 PROCEDURE — 99239 HOSP IP/OBS DSCHRG MGMT >30: CPT

## 2023-12-10 PROCEDURE — 83735 ASSAY OF MAGNESIUM: CPT

## 2023-12-10 PROCEDURE — 99261: CPT

## 2023-12-10 PROCEDURE — 84100 ASSAY OF PHOSPHORUS: CPT

## 2023-12-10 PROCEDURE — 85730 THROMBOPLASTIN TIME PARTIAL: CPT

## 2023-12-10 PROCEDURE — 80048 BASIC METABOLIC PNL TOTAL CA: CPT

## 2023-12-10 PROCEDURE — 87340 HEPATITIS B SURFACE AG IA: CPT

## 2023-12-10 PROCEDURE — 71045 X-RAY EXAM CHEST 1 VIEW: CPT

## 2023-12-10 RX ORDER — OXYCODONE HYDROCHLORIDE 5 MG/1
2.5 TABLET ORAL ONCE
Refills: 0 | Status: DISCONTINUED | OUTPATIENT
Start: 2023-12-10 | End: 2023-12-10

## 2023-12-10 RX ORDER — AMLODIPINE BESYLATE 2.5 MG/1
1 TABLET ORAL
Qty: 0 | Refills: 0 | DISCHARGE

## 2023-12-10 RX ADMIN — Medication 25 MILLIGRAM(S): at 07:14

## 2023-12-10 RX ADMIN — Medication 650 MILLIGRAM(S): at 09:42

## 2023-12-10 RX ADMIN — Medication 325 MILLIGRAM(S): at 11:58

## 2023-12-10 RX ADMIN — SEVELAMER CARBONATE 1600 MILLIGRAM(S): 2400 POWDER, FOR SUSPENSION ORAL at 07:14

## 2023-12-10 RX ADMIN — OXYCODONE HYDROCHLORIDE 2.5 MILLIGRAM(S): 5 TABLET ORAL at 14:00

## 2023-12-10 RX ADMIN — Medication 50 MILLIGRAM(S): at 07:14

## 2023-12-10 RX ADMIN — GABAPENTIN 300 MILLIGRAM(S): 400 CAPSULE ORAL at 11:57

## 2023-12-10 RX ADMIN — Medication 10 MILLIGRAM(S): at 07:14

## 2023-12-10 RX ADMIN — Medication 650 MILLIGRAM(S): at 08:42

## 2023-12-10 RX ADMIN — Medication 1 TABLET(S): at 11:57

## 2023-12-10 RX ADMIN — SEVELAMER CARBONATE 1600 MILLIGRAM(S): 2400 POWDER, FOR SUSPENSION ORAL at 14:00

## 2023-12-10 NOTE — DISCHARGE NOTE PROVIDER - NSDCCPCAREPLAN_GEN_ALL_CORE_FT
PRINCIPAL DISCHARGE DIAGNOSIS  Diagnosis: Hypertensive urgency  Assessment and Plan of Treatment: You presented after you missed your dialysis session. You were found to have high blood pressure and you had an HD session that helped control your symptoms. . Please continue to taking your medications as prescribed. Please measure your blood pressure at least once daily. Maintain a healthy diet which includes incorporating more vegetables and decreasing the amount of salt (low sodium) and sugar in your diet such as rice, bread, and pasta low sugar, low fat, low sodium diet. Exercise frequently if possible.  Please follow up with your primary care provider within one week of your discharge.        SECONDARY DISCHARGE DIAGNOSES  Diagnosis: DM (diabetes mellitus)  Assessment and Plan of Treatment: WE HELD YOUR NOVOLOG/INSULIN BEFORE MEALS BECAUSE YOUR BLOOD SUGAR WAS LOW. CONTINUE TO TAKE YOUR TRULICITY AS PRESCRIBED. IF YOUR BLOOD SUGAR REMIANS HIGH BEFORE MEALS YOU CAN THEN RESTART YOUR NOVOLOG  Please check your blood sugar levels twice daily - Morning/Afternoon, and Lunch/Bedtime the following day. Keep a record of your blood sugar readings  You must maintain a healthy diet that consists of low sugar and low fat. Your diet must consist of mostly vegetables. Please limit your intake of high sugar and high carbohydrate foods such as pasta, rice, and bread. Exercise frequently if possible. Follow up with primary care physician within one week of your discharge to further manage your diabetes and monitor your Hemoglobin A1c levels after 3 months to evaluate your diabetes control.      Diagnosis: ESRD on dialysis  Assessment and Plan of Treatment: You will need to continue your dialysis sessions as scheduled. YOU ARE SCHEDULED TOMORROW FOR ANOTHER SESSION.    Diagnosis: Sarcoidosis  Assessment and Plan of Treatment: Continue to take your steriods as prescribed. Please follow up with your primary care physician in one week to inform them of your recent hospitalization and further management of your medical conditions.

## 2023-12-10 NOTE — DISCHARGE NOTE NURSING/CASE MANAGEMENT/SOCIAL WORK - PATIENT PORTAL LINK FT
You can access the FollowMyHealth Patient Portal offered by [facility] by registering at the following website: [email or web address]. By joining [employer]’s FollowMyHealth portal, you will also be able to view your health information using other applications (apps) compatible with our system. You can access the FollowMyHealth Patient Portal offered by [facility] by registering at the following website: [email or web address]. By joining [employer]’s FollowMyHealth portal, you will also be able to view your health information using other applications (apps) compatible with our system.

## 2023-12-10 NOTE — DISCHARGE NOTE PROVIDER - ATTENDING DISCHARGE PHYSICAL EXAMINATION:
Gen: NAD  Neuro: alert, answering qs appropriately, moves all extremities  HEENT: anicteric, moist oral mucosa  Neck: supple  Cards: no murmurs appreicated  Pulm: good inspiratory effort, breathing comfortably  Abd: soft, NT/ND, BS+  Ext: no edema  Skin: warm, dry

## 2023-12-10 NOTE — DISCHARGE NOTE NURSING/CASE MANAGEMENT/SOCIAL WORK - NSDCPEFALRISK_GEN_ALL_CORE
For information on Fall & Injury Prevention, visit: [email or web address] OR  [email or web address] OR  https://www.cdc.gov/steadi/patient.html For information on Fall & Injury Prevention, visit: [email or web address] OR  [email or web address] OR  https://www.cdc.gov/steadi/patient.html

## 2023-12-10 NOTE — DISCHARGE NOTE PROVIDER - CARE PROVIDERS DIRECT ADDRESSES
[email or web address]

## 2023-12-10 NOTE — DISCHARGE NOTE PROVIDER - HOSPITAL COURSE
54 y/o F w/ PMHx of ESRD on HD M/W/F, T2DM, HTN, HLD, sarcoidosis, anemia, seizure disorder (no longer on meds) presents to the ED after missing Monday and Wednesday dialysis. She was directed to come here for inpatient HD. Patient was found to have HTN emergency with /120. Patient was dialyzed and started on home blood pressure medications. For DM, Patient lipro was held due to hypoglycemia and will be held on d/c.   Pt is stable for discharge. Pt has been advised to follow up as outpatient. Case has been discussed with the attending. This is just a summary of the case. For further information please refer to pt. chart document.           54 y/o F w/ PMHx of ESRD on HD M/W/F, T2DM, HTN, HLD, sarcoidosis, anemia, seizure disorder (no longer on meds) presents to the ED after missing Monday and Wednesday dialysis. She was directed to come here for inpatient HD. Patient was found to have HTN emergency with /120. Patient was dialyzed and started on home blood pressure medications. For DM, Patient lipro was held due to hypoglycemia and will be held on d/c.   Pt is stable for discharge. Pt has been advised to follow up as outpatient. Case has been discussed with the attending. This is just a summary of the case. For further information please refer to pt. chart document.    Discussed with Nephro Dr. Das who says patient can be discharged today. She can report for her outpatient HD tomorrow which is already set up. 52 y/o F w/ PMHx of ESRD on HD M/W/F, T2DM, HTN, HLD, sarcoidosis, anemia, seizure disorder (no longer on meds) presents to the ED after missing Monday and Wednesday dialysis. She was directed to come here for inpatient HD. Patient was found to have HTN emergency with /120. Patient was dialyzed and started on home blood pressure medications. For DM, Patient lipro was held due to hypoglycemia and will be held on d/c.   Pt is stable for discharge. Pt has been advised to follow up as outpatient. Case has been discussed with the attending. This is just a summary of the case. For further information please refer to pt. chart document.    Discussed with Nephro Dr. Das who says patient can be discharged today. She can report for her outpatient HD tomorrow which is already set up.

## 2023-12-10 NOTE — DISCHARGE NOTE PROVIDER - CARE PROVIDER_API CALL
Balbir Das  Nephrology  [address] [place] [id number]  Phone: [phone]  Fax: [phone]  Follow Up Time: 1 week   Balbir Das  Nephrology  [address] [place] [id number]  Phone: [phone]  Fax: [phone]  Follow Up Time: 1 week

## 2023-12-10 NOTE — DISCHARGE NOTE PROVIDER - NSDCMRMEDTOKEN_GEN_ALL_CORE_FT
albuterol 90 mcg/inh inhalation aerosol: 2 puff(s) inhaled every 4 hours, As Needed  epoetin suzette: 4,000 unit(s) injectable 3 times a week Administer with HD  ferrous sulfate 325 mg (65 mg elemental iron) oral delayed release tablet: 1 tab(s) orally once a day  gabapentin 300 mg oral capsule: 1 cap(s) orally once a day  hydrALAZINE 50 mg oral tablet: 1 tab(s) orally 2 times a day  lactobacillus acidophilus oral capsule: 1 tab(s) orally once a day  metOLazone 5 mg oral tablet: 1 tab(s) orally 2 times a day  metoprolol succinate 25 mg oral tablet, extended release: 1 tab(s) orally once a day  predniSONE 10 mg oral tablet: 1 tab(s) orally once a day  Renvela 800 mg oral tablet: 2 tab(s) orally 3 times a day  rosuvastatin 40 mg oral tablet: 1 tab(s) orally once a day  SEVELAMER CARBONATE  800 MG TABS: 2 tab(s) orally 3 times a day  traZODone 50 mg oral tablet: 1 tab(s) orally once a day (at bedtime)  Trulicity Pen 0.75 mg/0.5 mL subcutaneous solution: 0.75 milligram(s) subcutaneously once a week (Patient reports last dose ~1.5 weeks ago - unsure which day).  Vitamin D2 1.25 mg (50,000 intl units) oral capsule: 1 cap(s) orally once a week   albuterol 90 mcg/inh inhalation aerosol: 2 puff(s) inhaled every 4 hours, As Needed  epoetin suzette: 4,000 unit(s) injectable 3 times a week Administer with HD  ferrous sulfate 325 mg (65 mg elemental iron) oral delayed release tablet: 1 tab(s) orally once a day  gabapentin 300 mg oral capsule: 1 cap(s) orally once a day  hydrALAZINE 50 mg oral tablet: 1 tab(s) orally 2 times a day  lactobacillus acidophilus oral capsule: 1 tab(s) orally once a day  metOLazone 5 mg oral tablet: 1 tab(s) orally 2 times a day  metoprolol succinate 25 mg oral tablet, extended release: 1 tab(s) orally once a day  predniSONE 10 mg oral tablet: 1 tab(s) orally once a day  Renvela 800 mg oral tablet: 2 tab(s) orally 3 times a day  rosuvastatin 40 mg oral tablet: 1 tab(s) orally once a day  SEVELAMER CARBONATE  800 MG TABS: 2 tab(s) orally 3 times a day  traZODone 50 mg oral tablet: 1 tab(s) orally once a day (at bedtime)  Trulicity Pen 0.75 mg/0.5 mL subcutaneous solution: 0.75 milligram(s) subcutaneously once a week  Vitamin D2 1.25 mg (50,000 intl units) oral capsule: 1 cap(s) orally once a week

## 2023-12-10 NOTE — DISCHARGE NOTE PROVIDER - NSDCFUSCHEDAPPT_GEN_ALL_CORE_FT
[facility]  INTMED  [facility]   Scheduled Appointment: 12/13/2023     [facility]  INTMED  [facility]   Scheduled Appointment: 12/13/2023

## 2023-12-10 NOTE — PROGRESS NOTE ADULT - ASSESSMENT
A/P  ESRD  ON  HD      WAS  DIALYZED  YESTERDAY     HAD  MISSED  2  HD  SESSIONS  AS OUTPT ,  REASON  UNCLEAR      EDUCATED  THE  PT  ON  THE  RISKS  OF  MISSING  HD     ADV  TO GO  ON  HER  SCHEDULE    BP  IS   A LITTLE  HIGH   WILL CONT HER  HOME  MEDS    D/C  HOME TODAY     PT  KNOWS  THAT  SHE  WILL GO  FOR  HER  DIALYSIS  ON  HER ASSIGNED  TIME  TOMORROW  AT  [facility]  DIALYSIS  UNIT    SPOKE  WITH  THE   HOUSE  STAFF A/P  ESRD  ON  HD      WAS  DIALYZED  YESTERDAY     HAD  MISSED  2  HD  SESSIONS  AS OUTPT ,  REASON  UNCLEAR      EDUCATED  THE  PT  ON  THE  RISKS  OF  MISSING  HD     ADV  TO GO  ON  HER  SCHEDULE    BP  IS   A LITTLE  HIGH   WILL CONT HER  HOME  MEDS    D/C  HOME TODAY     PT  KNOWS  THAT  SHE  WILL GO  FOR  HER  DIALYSIS  ON  HER ASSIGNED  TIME  TOMORROW  AT  [facility]  DIALYSIS  UNIT    SPOKE  WITH  THE   HOUSE  STAFF

## 2023-12-10 NOTE — PROGRESS NOTE ADULT - SUBJECTIVE AND OBJECTIVE BOX
Patient is a 53y Female with  ESRD ON  HD    ADMITTED  AFTER  MISSING  MULTIPLE  HD  SESSIONS    WAS  DIALYZED  YESTERDAY    HAS NO  COMPLAINTS AT THE TIME OF  EXAM    WANTS  TO GO  HOME    No Known Allergies    Hospital Medications:   MEDICATIONS  (STANDING):  atorvastatin 80 milliGRAM(s) Oral at bedtime  dextrose 50% Injectable 50 milliLiter(s) IV Push once  epoetin suzette (EPOGEN) Injectable 4000 Unit(s) IV Push <User Schedule>  ergocalciferol 44844 Unit(s) Oral every week  ferrous    sulfate 325 milliGRAM(s) Oral daily  gabapentin 300 milliGRAM(s) Oral daily  heparin   Injectable 5000 Unit(s) SubCutaneous every 8 hours  hydrALAZINE 50 milliGRAM(s) Oral two times a day  influenza   Vaccine 0.5 milliLiter(s) IntraMuscular once  insulin glargine Injectable (LANTUS) 17 Unit(s) SubCutaneous at bedtime  insulin lispro (ADMELOG) corrective regimen sliding scale   SubCutaneous three times a day before meals  insulin lispro (ADMELOG) corrective regimen sliding scale   SubCutaneous at bedtime  insulin lispro Injectable (ADMELOG) 10 Unit(s) SubCutaneous three times a day before meals  lactobacillus acidophilus 1 Tablet(s) Oral daily  metolazone 5 milliGRAM(s) Oral <User Schedule>  metoprolol succinate ER 25 milliGRAM(s) Oral daily  predniSONE   Tablet 10 milliGRAM(s) Oral daily  sevelamer carbonate 1600 milliGRAM(s) Oral three times a day    REVIEW OF SYSTEMS:  HAS NO   FEVER  CHILLS   NO   SOB  OR  COUGH   NO CH  PAIN  / PALPITATIONS   APPETITE IS  OK, NO  N/V  OR  ABD  PAIN  VOIDING  WELL   NO LEG  SWELLING    VITALS:  T(F): 97.9 (12-10-23 @ 05:15), Max: 98.5 (12-09-23 @ 18:57)  HR: 88 (12-10-23 @ 05:15)  BP: 169/86 (12-10-23 @ 05:15)  RR: 18 (12-10-23 @ 05:15)  SpO2: 100% (12-10-23 @ 05:15)  Wt(kg): --    12-09 @ 07:01  -  12-10 @ 07:00  --------------------------------------------------------  IN: 500 mL / OUT: 1651 mL / NET: -1151 mL        PHYSICAL EXAM:  Constitutional: NAD  HEENT: CONJ  PINK  Neck: No JVD  Respiratory: CTAB, no wheezes, rales or rhonchi  Cardiovascular: S1, S2, RRR  Gastrointestinal: BS+, soft, NT/ND  Extremities: . No peripheral edema  Neurological: A/O x 3  :  No rai.     Vascular Access: R  IJ  PC    LABS:  12-10    137  |  102  |  25<H>  ----------------------------<  90  3.5   |  27  |  6.37<H>    Ca    7.7<L>      10 Dec 2023 06:45  Phos  6.0     12-10  Mg     2.4     12-10    TPro  6.0  /  Alb  2.2<L>  /  TBili  0.3  /  DBili      /  AST  14  /  ALT  12  /  AlkPhos  47  12-09    Creatinine Trend: 6.37 <--, 12.90 <--, 12.80 <--                        9.3    5.66  )-----------( 213      ( 10 Dec 2023 06:45 )             29.3     Urine Studies:  Urinalysis Basic - ( 10 Dec 2023 06:45 )    Color:  / Appearance:  / SG:  / pH:   Gluc: 90 mg/dL / Ketone:   / Bili:  / Urobili:    Blood:  / Protein:  / Nitrite:    Leuk Esterase:  / RBC:  / WBC    Sq Epi:  / Non Sq Epi:  / Bacteria:         RADIOLOGY & ADDITIONAL STUDIES:     Patient is a 53y Female with  ESRD ON  HD    ADMITTED  AFTER  MISSING  MULTIPLE  HD  SESSIONS    WAS  DIALYZED  YESTERDAY    HAS NO  COMPLAINTS AT THE TIME OF  EXAM    WANTS  TO GO  HOME    No Known Allergies    Hospital Medications:   MEDICATIONS  (STANDING):  atorvastatin 80 milliGRAM(s) Oral at bedtime  dextrose 50% Injectable 50 milliLiter(s) IV Push once  epoetin suzette (EPOGEN) Injectable 4000 Unit(s) IV Push <User Schedule>  ergocalciferol 39199 Unit(s) Oral every week  ferrous    sulfate 325 milliGRAM(s) Oral daily  gabapentin 300 milliGRAM(s) Oral daily  heparin   Injectable 5000 Unit(s) SubCutaneous every 8 hours  hydrALAZINE 50 milliGRAM(s) Oral two times a day  influenza   Vaccine 0.5 milliLiter(s) IntraMuscular once  insulin glargine Injectable (LANTUS) 17 Unit(s) SubCutaneous at bedtime  insulin lispro (ADMELOG) corrective regimen sliding scale   SubCutaneous three times a day before meals  insulin lispro (ADMELOG) corrective regimen sliding scale   SubCutaneous at bedtime  insulin lispro Injectable (ADMELOG) 10 Unit(s) SubCutaneous three times a day before meals  lactobacillus acidophilus 1 Tablet(s) Oral daily  metolazone 5 milliGRAM(s) Oral <User Schedule>  metoprolol succinate ER 25 milliGRAM(s) Oral daily  predniSONE   Tablet 10 milliGRAM(s) Oral daily  sevelamer carbonate 1600 milliGRAM(s) Oral three times a day    REVIEW OF SYSTEMS:  HAS NO   FEVER  CHILLS   NO   SOB  OR  COUGH   NO CH  PAIN  / PALPITATIONS   APPETITE IS  OK, NO  N/V  OR  ABD  PAIN  VOIDING  WELL   NO LEG  SWELLING    VITALS:  T(F): 97.9 (12-10-23 @ 05:15), Max: 98.5 (12-09-23 @ 18:57)  HR: 88 (12-10-23 @ 05:15)  BP: 169/86 (12-10-23 @ 05:15)  RR: 18 (12-10-23 @ 05:15)  SpO2: 100% (12-10-23 @ 05:15)  Wt(kg): --    12-09 @ 07:01  -  12-10 @ 07:00  --------------------------------------------------------  IN: 500 mL / OUT: 1651 mL / NET: -1151 mL        PHYSICAL EXAM:  Constitutional: NAD  HEENT: CONJ  PINK  Neck: No JVD  Respiratory: CTAB, no wheezes, rales or rhonchi  Cardiovascular: S1, S2, RRR  Gastrointestinal: BS+, soft, NT/ND  Extremities: . No peripheral edema  Neurological: A/O x 3  :  No rai.     Vascular Access: R  IJ  PC    LABS:  12-10    137  |  102  |  25<H>  ----------------------------<  90  3.5   |  27  |  6.37<H>    Ca    7.7<L>      10 Dec 2023 06:45  Phos  6.0     12-10  Mg     2.4     12-10    TPro  6.0  /  Alb  2.2<L>  /  TBili  0.3  /  DBili      /  AST  14  /  ALT  12  /  AlkPhos  47  12-09    Creatinine Trend: 6.37 <--, 12.90 <--, 12.80 <--                        9.3    5.66  )-----------( 213      ( 10 Dec 2023 06:45 )             29.3     Urine Studies:  Urinalysis Basic - ( 10 Dec 2023 06:45 )    Color:  / Appearance:  / SG:  / pH:   Gluc: 90 mg/dL / Ketone:   / Bili:  / Urobili:    Blood:  / Protein:  / Nitrite:    Leuk Esterase:  / RBC:  / WBC    Sq Epi:  / Non Sq Epi:  / Bacteria:         RADIOLOGY & ADDITIONAL STUDIES:

## 2023-12-13 ENCOUNTER — APPOINTMENT (OUTPATIENT)
Dept: INTERNAL MEDICINE | Facility: CLINIC | Age: 53
End: 2023-12-13

## 2023-12-22 ENCOUNTER — NON-APPOINTMENT (OUTPATIENT)
Age: 53
End: 2023-12-22

## 2023-12-22 ENCOUNTER — OUTPATIENT (OUTPATIENT)
Dept: OUTPATIENT SERVICES | Facility: HOSPITAL | Age: 53
LOS: 1 days | End: 2023-12-22
Payer: MEDICARE

## 2023-12-22 ENCOUNTER — APPOINTMENT (OUTPATIENT)
Dept: INTERNAL MEDICINE | Facility: CLINIC | Age: 53
End: 2023-12-22
Payer: MEDICARE

## 2023-12-22 DIAGNOSIS — M79.605 LOW BACK PAIN, UNSPECIFIED: ICD-10-CM

## 2023-12-22 DIAGNOSIS — I10 ESSENTIAL (PRIMARY) HYPERTENSION: ICD-10-CM

## 2023-12-22 DIAGNOSIS — M54.50 LOW BACK PAIN, UNSPECIFIED: ICD-10-CM

## 2023-12-22 PROCEDURE — 99213 OFFICE O/P EST LOW 20 MIN: CPT | Mod: GE

## 2023-12-22 PROCEDURE — G0463: CPT

## 2023-12-27 ENCOUNTER — INPATIENT (INPATIENT)
Facility: HOSPITAL | Age: 53
LOS: 1 days | Discharge: HOME | DRG: 640 | End: 2023-12-29
Attending: INTERNAL MEDICINE | Admitting: INTERNAL MEDICINE
Payer: MEDICARE

## 2023-12-27 VITALS
OXYGEN SATURATION: 94 % | SYSTOLIC BLOOD PRESSURE: 205 MMHG | TEMPERATURE: 98 F | RESPIRATION RATE: 26 BRPM | WEIGHT: 103.62 LBS | HEART RATE: 94 BPM | HEIGHT: 61.02 IN | DIASTOLIC BLOOD PRESSURE: 111 MMHG

## 2023-12-27 DIAGNOSIS — N18.6 END STAGE RENAL DISEASE: ICD-10-CM

## 2023-12-27 DIAGNOSIS — D86.9 SARCOIDOSIS, UNSPECIFIED: ICD-10-CM

## 2023-12-27 DIAGNOSIS — Z29.9 ENCOUNTER FOR PROPHYLACTIC MEASURES, UNSPECIFIED: ICD-10-CM

## 2023-12-27 DIAGNOSIS — E11.9 TYPE 2 DIABETES MELLITUS WITHOUT COMPLICATIONS: ICD-10-CM

## 2023-12-27 DIAGNOSIS — Z83.3 FAMILY HISTORY OF DIABETES MELLITUS: ICD-10-CM

## 2023-12-27 DIAGNOSIS — I10 ESSENTIAL (PRIMARY) HYPERTENSION: ICD-10-CM

## 2023-12-27 DIAGNOSIS — I16.0 HYPERTENSIVE URGENCY: ICD-10-CM

## 2023-12-27 LAB
ANION GAP SERPL CALC-SCNC: 12 MMOL/L — SIGNIFICANT CHANGE UP (ref 5–17)
ANION GAP SERPL CALC-SCNC: 12 MMOL/L — SIGNIFICANT CHANGE UP (ref 5–17)
BASOPHILS # BLD AUTO: 0.03 K/UL — SIGNIFICANT CHANGE UP (ref 0–0.2)
BASOPHILS # BLD AUTO: 0.03 K/UL — SIGNIFICANT CHANGE UP (ref 0–0.2)
BASOPHILS NFR BLD AUTO: 0.3 % — SIGNIFICANT CHANGE UP (ref 0–2)
BASOPHILS NFR BLD AUTO: 0.3 % — SIGNIFICANT CHANGE UP (ref 0–2)
BUN SERPL-MCNC: 45 MG/DL — HIGH (ref 7–18)
BUN SERPL-MCNC: 45 MG/DL — HIGH (ref 7–18)
CALCIUM SERPL-MCNC: 8.7 MG/DL — SIGNIFICANT CHANGE UP (ref 8.4–10.5)
CALCIUM SERPL-MCNC: 8.7 MG/DL — SIGNIFICANT CHANGE UP (ref 8.4–10.5)
CHLORIDE SERPL-SCNC: 103 MMOL/L — SIGNIFICANT CHANGE UP (ref 96–108)
CHLORIDE SERPL-SCNC: 103 MMOL/L — SIGNIFICANT CHANGE UP (ref 96–108)
CO2 SERPL-SCNC: 24 MMOL/L — SIGNIFICANT CHANGE UP (ref 22–31)
CO2 SERPL-SCNC: 24 MMOL/L — SIGNIFICANT CHANGE UP (ref 22–31)
CREAT SERPL-MCNC: 9.74 MG/DL — HIGH (ref 0.5–1.3)
CREAT SERPL-MCNC: 9.74 MG/DL — HIGH (ref 0.5–1.3)
EGFR: 4 ML/MIN/1.73M2 — LOW
EGFR: 4 ML/MIN/1.73M2 — LOW
EOSINOPHIL # BLD AUTO: 0.28 K/UL — SIGNIFICANT CHANGE UP (ref 0–0.5)
EOSINOPHIL # BLD AUTO: 0.28 K/UL — SIGNIFICANT CHANGE UP (ref 0–0.5)
EOSINOPHIL NFR BLD AUTO: 2.7 % — SIGNIFICANT CHANGE UP (ref 0–6)
EOSINOPHIL NFR BLD AUTO: 2.7 % — SIGNIFICANT CHANGE UP (ref 0–6)
FLUAV AG NPH QL: SIGNIFICANT CHANGE UP
FLUAV AG NPH QL: SIGNIFICANT CHANGE UP
FLUBV AG NPH QL: SIGNIFICANT CHANGE UP
FLUBV AG NPH QL: SIGNIFICANT CHANGE UP
GLUCOSE BLDC GLUCOMTR-MCNC: 131 MG/DL — HIGH (ref 70–99)
GLUCOSE BLDC GLUCOMTR-MCNC: 131 MG/DL — HIGH (ref 70–99)
GLUCOSE BLDC GLUCOMTR-MCNC: 146 MG/DL — HIGH (ref 70–99)
GLUCOSE SERPL-MCNC: 166 MG/DL — HIGH (ref 70–99)
GLUCOSE SERPL-MCNC: 166 MG/DL — HIGH (ref 70–99)
HBV SURFACE AB SER-ACNC: SIGNIFICANT CHANGE UP
HBV SURFACE AB SER-ACNC: SIGNIFICANT CHANGE UP
HBV SURFACE AG SER-ACNC: SIGNIFICANT CHANGE UP
HBV SURFACE AG SER-ACNC: SIGNIFICANT CHANGE UP
HCT VFR BLD CALC: 36.1 % — SIGNIFICANT CHANGE UP (ref 34.5–45)
HCT VFR BLD CALC: 36.1 % — SIGNIFICANT CHANGE UP (ref 34.5–45)
HGB BLD-MCNC: 11.4 G/DL — LOW (ref 11.5–15.5)
HGB BLD-MCNC: 11.4 G/DL — LOW (ref 11.5–15.5)
IMM GRANULOCYTES NFR BLD AUTO: 0.8 % — SIGNIFICANT CHANGE UP (ref 0–0.9)
IMM GRANULOCYTES NFR BLD AUTO: 0.8 % — SIGNIFICANT CHANGE UP (ref 0–0.9)
LYMPHOCYTES # BLD AUTO: 2.12 K/UL — SIGNIFICANT CHANGE UP (ref 1–3.3)
LYMPHOCYTES # BLD AUTO: 2.12 K/UL — SIGNIFICANT CHANGE UP (ref 1–3.3)
LYMPHOCYTES # BLD AUTO: 20.7 % — SIGNIFICANT CHANGE UP (ref 13–44)
LYMPHOCYTES # BLD AUTO: 20.7 % — SIGNIFICANT CHANGE UP (ref 13–44)
MCHC RBC-ENTMCNC: 28.4 PG — SIGNIFICANT CHANGE UP (ref 27–34)
MCHC RBC-ENTMCNC: 28.4 PG — SIGNIFICANT CHANGE UP (ref 27–34)
MCHC RBC-ENTMCNC: 31.6 GM/DL — LOW (ref 32–36)
MCHC RBC-ENTMCNC: 31.6 GM/DL — LOW (ref 32–36)
MCV RBC AUTO: 89.8 FL — SIGNIFICANT CHANGE UP (ref 80–100)
MCV RBC AUTO: 89.8 FL — SIGNIFICANT CHANGE UP (ref 80–100)
MONOCYTES # BLD AUTO: 0.79 K/UL — SIGNIFICANT CHANGE UP (ref 0–0.9)
MONOCYTES # BLD AUTO: 0.79 K/UL — SIGNIFICANT CHANGE UP (ref 0–0.9)
MONOCYTES NFR BLD AUTO: 7.7 % — SIGNIFICANT CHANGE UP (ref 2–14)
MONOCYTES NFR BLD AUTO: 7.7 % — SIGNIFICANT CHANGE UP (ref 2–14)
NEUTROPHILS # BLD AUTO: 6.92 K/UL — SIGNIFICANT CHANGE UP (ref 1.8–7.4)
NEUTROPHILS # BLD AUTO: 6.92 K/UL — SIGNIFICANT CHANGE UP (ref 1.8–7.4)
NEUTROPHILS NFR BLD AUTO: 67.8 % — SIGNIFICANT CHANGE UP (ref 43–77)
NEUTROPHILS NFR BLD AUTO: 67.8 % — SIGNIFICANT CHANGE UP (ref 43–77)
NRBC # BLD: 0 /100 WBCS — SIGNIFICANT CHANGE UP (ref 0–0)
NRBC # BLD: 0 /100 WBCS — SIGNIFICANT CHANGE UP (ref 0–0)
NT-PROBNP SERPL-SCNC: HIGH PG/ML (ref 0–125)
NT-PROBNP SERPL-SCNC: HIGH PG/ML (ref 0–125)
PHOSPHATE SERPL-MCNC: 5.7 MG/DL — HIGH (ref 2.5–4.5)
PHOSPHATE SERPL-MCNC: 5.7 MG/DL — HIGH (ref 2.5–4.5)
PLATELET # BLD AUTO: 307 K/UL — SIGNIFICANT CHANGE UP (ref 150–400)
PLATELET # BLD AUTO: 307 K/UL — SIGNIFICANT CHANGE UP (ref 150–400)
POTASSIUM SERPL-MCNC: 4.2 MMOL/L — SIGNIFICANT CHANGE UP (ref 3.5–5.3)
POTASSIUM SERPL-MCNC: 4.2 MMOL/L — SIGNIFICANT CHANGE UP (ref 3.5–5.3)
POTASSIUM SERPL-SCNC: 4.2 MMOL/L — SIGNIFICANT CHANGE UP (ref 3.5–5.3)
POTASSIUM SERPL-SCNC: 4.2 MMOL/L — SIGNIFICANT CHANGE UP (ref 3.5–5.3)
RBC # BLD: 4.02 M/UL — SIGNIFICANT CHANGE UP (ref 3.8–5.2)
RBC # BLD: 4.02 M/UL — SIGNIFICANT CHANGE UP (ref 3.8–5.2)
RBC # FLD: 16.8 % — HIGH (ref 10.3–14.5)
RBC # FLD: 16.8 % — HIGH (ref 10.3–14.5)
SARS-COV-2 RNA SPEC QL NAA+PROBE: SIGNIFICANT CHANGE UP
SARS-COV-2 RNA SPEC QL NAA+PROBE: SIGNIFICANT CHANGE UP
SODIUM SERPL-SCNC: 139 MMOL/L — SIGNIFICANT CHANGE UP (ref 135–145)
SODIUM SERPL-SCNC: 139 MMOL/L — SIGNIFICANT CHANGE UP (ref 135–145)
TROPONIN I, HIGH SENSITIVITY RESULT: 52.4 NG/L — SIGNIFICANT CHANGE UP
TROPONIN I, HIGH SENSITIVITY RESULT: 52.4 NG/L — SIGNIFICANT CHANGE UP
WBC # BLD: 10.22 K/UL — SIGNIFICANT CHANGE UP (ref 3.8–10.5)
WBC # BLD: 10.22 K/UL — SIGNIFICANT CHANGE UP (ref 3.8–10.5)
WBC # FLD AUTO: 10.22 K/UL — SIGNIFICANT CHANGE UP (ref 3.8–10.5)
WBC # FLD AUTO: 10.22 K/UL — SIGNIFICANT CHANGE UP (ref 3.8–10.5)

## 2023-12-27 PROCEDURE — 71045 X-RAY EXAM CHEST 1 VIEW: CPT | Mod: 26

## 2023-12-27 PROCEDURE — 99285 EMERGENCY DEPT VISIT HI MDM: CPT

## 2023-12-27 RX ORDER — LABETALOL HCL 100 MG
5 TABLET ORAL ONCE
Refills: 0 | Status: COMPLETED | OUTPATIENT
Start: 2023-12-27 | End: 2023-12-27

## 2023-12-27 RX ORDER — ACETAMINOPHEN 500 MG
650 TABLET ORAL EVERY 6 HOURS
Refills: 0 | Status: DISCONTINUED | OUTPATIENT
Start: 2023-12-27 | End: 2023-12-29

## 2023-12-27 RX ORDER — ALBUTEROL 90 UG/1
2 AEROSOL, METERED ORAL EVERY 6 HOURS
Refills: 0 | Status: DISCONTINUED | OUTPATIENT
Start: 2023-12-27 | End: 2023-12-29

## 2023-12-27 RX ORDER — METOPROLOL TARTRATE 50 MG
12.5 TABLET ORAL
Refills: 0 | Status: DISCONTINUED | OUTPATIENT
Start: 2023-12-27 | End: 2023-12-29

## 2023-12-27 RX ORDER — INSULIN LISPRO 100/ML
VIAL (ML) SUBCUTANEOUS AT BEDTIME
Refills: 0 | Status: DISCONTINUED | OUTPATIENT
Start: 2023-12-27 | End: 2023-12-29

## 2023-12-27 RX ORDER — HEPARIN SODIUM 5000 [USP'U]/ML
5000 INJECTION INTRAVENOUS; SUBCUTANEOUS EVERY 8 HOURS
Refills: 0 | Status: DISCONTINUED | OUTPATIENT
Start: 2023-12-27 | End: 2023-12-29

## 2023-12-27 RX ORDER — HYDRALAZINE HCL 50 MG
50 TABLET ORAL ONCE
Refills: 0 | Status: COMPLETED | OUTPATIENT
Start: 2023-12-27 | End: 2023-12-27

## 2023-12-27 RX ORDER — INSULIN GLARGINE 100 [IU]/ML
14 INJECTION, SOLUTION SUBCUTANEOUS AT BEDTIME
Refills: 0 | Status: DISCONTINUED | OUTPATIENT
Start: 2023-12-27 | End: 2023-12-27

## 2023-12-27 RX ORDER — INSULIN LISPRO 100/ML
VIAL (ML) SUBCUTANEOUS
Refills: 0 | Status: DISCONTINUED | OUTPATIENT
Start: 2023-12-27 | End: 2023-12-29

## 2023-12-27 RX ORDER — TRAZODONE HCL 50 MG
50 TABLET ORAL AT BEDTIME
Refills: 0 | Status: DISCONTINUED | OUTPATIENT
Start: 2023-12-27 | End: 2023-12-29

## 2023-12-27 RX ORDER — HYDRALAZINE HCL 50 MG
50 TABLET ORAL THREE TIMES A DAY
Refills: 0 | Status: DISCONTINUED | OUTPATIENT
Start: 2023-12-27 | End: 2023-12-29

## 2023-12-27 RX ORDER — INSULIN LISPRO 100/ML
7 VIAL (ML) SUBCUTANEOUS
Refills: 0 | Status: DISCONTINUED | OUTPATIENT
Start: 2023-12-27 | End: 2023-12-27

## 2023-12-27 RX ORDER — SEVELAMER CARBONATE 2400 MG/1
800 POWDER, FOR SUSPENSION ORAL
Refills: 0 | Status: DISCONTINUED | OUTPATIENT
Start: 2023-12-27 | End: 2023-12-29

## 2023-12-27 RX ORDER — HYDROMORPHONE HYDROCHLORIDE 2 MG/ML
0.5 INJECTION INTRAMUSCULAR; INTRAVENOUS; SUBCUTANEOUS ONCE
Refills: 0 | Status: DISCONTINUED | OUTPATIENT
Start: 2023-12-27 | End: 2023-12-27

## 2023-12-27 RX ORDER — FERROUS SULFATE 325(65) MG
325 TABLET ORAL DAILY
Refills: 0 | Status: DISCONTINUED | OUTPATIENT
Start: 2023-12-27 | End: 2023-12-29

## 2023-12-27 RX ORDER — ERYTHROPOIETIN 10000 [IU]/ML
4000 INJECTION, SOLUTION INTRAVENOUS; SUBCUTANEOUS
Refills: 0 | Status: DISCONTINUED | OUTPATIENT
Start: 2023-12-27 | End: 2023-12-29

## 2023-12-27 RX ORDER — HYDRALAZINE HCL 50 MG
50 TABLET ORAL
Refills: 0 | Status: DISCONTINUED | OUTPATIENT
Start: 2023-12-27 | End: 2023-12-27

## 2023-12-27 RX ORDER — ATORVASTATIN CALCIUM 80 MG/1
80 TABLET, FILM COATED ORAL AT BEDTIME
Refills: 0 | Status: DISCONTINUED | OUTPATIENT
Start: 2023-12-27 | End: 2023-12-29

## 2023-12-27 RX ORDER — GABAPENTIN 400 MG/1
300 CAPSULE ORAL DAILY
Refills: 0 | Status: DISCONTINUED | OUTPATIENT
Start: 2023-12-27 | End: 2023-12-28

## 2023-12-27 RX ADMIN — Medication 50 MILLIGRAM(S): at 21:30

## 2023-12-27 RX ADMIN — Medication 650 MILLIGRAM(S): at 18:55

## 2023-12-27 RX ADMIN — Medication 50 MILLIGRAM(S): at 16:51

## 2023-12-27 RX ADMIN — Medication 12.5 MILLIGRAM(S): at 17:56

## 2023-12-27 RX ADMIN — SEVELAMER CARBONATE 800 MILLIGRAM(S): 2400 POWDER, FOR SUSPENSION ORAL at 14:23

## 2023-12-27 RX ADMIN — HYDROMORPHONE HYDROCHLORIDE 0.5 MILLIGRAM(S): 2 INJECTION INTRAMUSCULAR; INTRAVENOUS; SUBCUTANEOUS at 18:47

## 2023-12-27 RX ADMIN — HEPARIN SODIUM 5000 UNIT(S): 5000 INJECTION INTRAVENOUS; SUBCUTANEOUS at 14:23

## 2023-12-27 RX ADMIN — Medication 50 MILLIGRAM(S): at 21:31

## 2023-12-27 RX ADMIN — Medication 50 MILLIGRAM(S): at 08:37

## 2023-12-27 RX ADMIN — HEPARIN SODIUM 5000 UNIT(S): 5000 INJECTION INTRAVENOUS; SUBCUTANEOUS at 21:31

## 2023-12-27 RX ADMIN — Medication 325 MILLIGRAM(S): at 14:22

## 2023-12-27 RX ADMIN — ERYTHROPOIETIN 4000 UNIT(S): 10000 INJECTION, SOLUTION INTRAVENOUS; SUBCUTANEOUS at 11:34

## 2023-12-27 RX ADMIN — ATORVASTATIN CALCIUM 80 MILLIGRAM(S): 80 TABLET, FILM COATED ORAL at 21:31

## 2023-12-27 RX ADMIN — HYDROMORPHONE HYDROCHLORIDE 0.5 MILLIGRAM(S): 2 INJECTION INTRAMUSCULAR; INTRAVENOUS; SUBCUTANEOUS at 19:47

## 2023-12-27 RX ADMIN — SEVELAMER CARBONATE 800 MILLIGRAM(S): 2400 POWDER, FOR SUSPENSION ORAL at 20:22

## 2023-12-27 RX ADMIN — Medication 650 MILLIGRAM(S): at 17:55

## 2023-12-27 RX ADMIN — Medication 5 MILLIGRAM(S): at 09:43

## 2023-12-27 RX ADMIN — GABAPENTIN 300 MILLIGRAM(S): 400 CAPSULE ORAL at 14:22

## 2023-12-27 NOTE — PROGRESS NOTE ADULT - ASSESSMENT
53 yr old female from home , H/O HTN/DM/ESRD on HD/sarcoidosis/anemia, admit hospital for hypertensive urgency/hypoxia/fluid overload/missed HD, S/P renal consult urgent HD , monitor vitals, monitor respiratory status. Advance care discussed , remain full code.

## 2023-12-27 NOTE — ED ADULT TRIAGE NOTE - BP NONINVASIVE SYSTOLIC (MM HG)
Anesthesia Pre-Procedure Evaluation    Patient: Michelle Luis   MRN: [id number] : 1957        Procedure : Procedure(s):  RIGHT BREAST LOCALIZED BIOPSY, RIGHT SENTINEL NODE BIOPSY          Past Medical History:   Diagnosis Date     Gestational diabetes       Past Surgical History:   Procedure Laterality Date     TONSILLECTOMY        No Known Allergies   Social History     Tobacco Use     Smoking status: Never     Smokeless tobacco: Never   Vaping Use     Vaping status: Never Used   Substance Use Topics     Alcohol use: Yes     Alcohol/week: 1.0 standard drink of alcohol     Comment: occasional      Wt Readings from Last 1 Encounters:   23 50.4 kg (111 lb 1.6 oz)        Anesthesia Evaluation   Pt has had prior anesthetic. Type: General.    No history of anesthetic complications       ROS/MED HX  ENT/Pulmonary:  - neg pulmonary ROS     Neurologic:  - neg neurologic ROS     Cardiovascular:     (+) Dyslipidemia -----    METS/Exercise Tolerance:     Hematologic:     (+) anemia,     Musculoskeletal:   (+) arthritis,     GI/Hepatic:  - neg GI/hepatic ROS     Renal/Genitourinary:  - neg Renal ROS     Endo:     (+) type II DM, Not using insulin, - not using insulin pump.     Psychiatric/Substance Use:  - neg psychiatric ROS     Infectious Disease:  - neg infectious disease ROS     Malignancy:   (+) Malignancy, History of Breast.Breast CA Active status post.        Other:  - neg other ROS          Physical Exam    Airway        Mallampati: I   TM distance: > 3 FB   Neck ROM: full   Mouth opening: > 3 cm    Respiratory Devices and Support         Dental       (+) Minor Abnormalities - some fillings, tiny chips      Cardiovascular   cardiovascular exam normal       Rhythm and rate: regular and normal     Pulmonary   pulmonary exam normal        breath sounds clear to auscultation       Other findings: Lab Test        23                       0901          0730          WBC          4.7           4.7           HGB          13.4         14.1          MCV          89           92            PLT          222          222            Lab Test        05/23/23 05/22/23 12/13/22 06/24/22                       1145          0901          0730          0732          NA            --          139          143          139           POTASSIUM     --          4.5          4.5          4.4           CHLORIDE      --          103          103          107           CO2           --          26           27           25            BUN           --          16.8         14.9         12            CR            --          0.85         0.78         0.80          ANIONGAP      --          10           13           7             CORDELL           --          9.4          9.8          9.2           GLC          128*         151*         145*         105*              OUTSIDE LABS:  CBC:   Lab Results   Component Value Date    WBC 4.7 05/22/2023    WBC 4.7 12/13/2022    HGB 13.4 05/22/2023    HGB 14.1 12/13/2022    HCT 40.0 05/22/2023    HCT 43.5 12/13/2022     05/22/2023     12/13/2022     BMP:   Lab Results   Component Value Date     05/22/2023     12/13/2022    POTASSIUM 4.5 05/22/2023    POTASSIUM 4.5 12/13/2022    CHLORIDE 103 05/22/2023    CHLORIDE 103 12/13/2022    CO2 26 05/22/2023    CO2 27 12/13/2022    BUN 16.8 05/22/2023    BUN 14.9 12/13/2022    CR 0.85 05/22/2023    CR 0.78 12/13/2022     (H) 05/23/2023     (H) 05/22/2023     COAGS: No results found for: PTT, INR, FIBR  POC: No results found for: BGM, HCG, HCGS  HEPATIC:   Lab Results   Component Value Date    ALBUMIN 4.4 05/22/2023    PROTTOTAL 6.7 05/22/2023    ALT 21 05/22/2023    AST 21 05/22/2023    ALKPHOS 59 05/22/2023    BILITOTAL 0.4 05/22/2023     OTHER:   Lab Results   Component Value Date    A1C 6.4 (H) 05/22/2023    CORDELL 9.4 05/22/2023    TSH 1.87 06/24/2022       Anesthesia Plan    ASA Status:  2       Anesthesia Type: General.     - Airway: LMA   Induction: Intravenous.   Maintenance: Balanced.        Consents    Anesthesia Plan(s) and associated risks, benefits, and realistic alternatives discussed. Questions answered and patient/representative(s) expressed understanding.     - Discussed: Risks, Benefits and Alternatives for BOTH SEDATION and the PROCEDURE were discussed     - Discussed with:  Patient      - Extended Intubation/Ventilatory Support Discussed: No.      - Patient is DNR/DNI Status: No    Use of blood products discussed: No .     Postoperative Care    Pain management: IV analgesics, Oral pain medications, Multi-modal analgesia.   PONV prophylaxis: Ondansetron (or other 5HT-3), Dexamethasone or Solumedrol, Background Propofol Infusion     Comments:                Osmany Bruno MD   205

## 2023-12-27 NOTE — ED ADULT NURSE REASSESSMENT NOTE - NS ED NURSE REASSESS COMMENT FT1
Patient is currently at Dialysis unit. Unable to administer medications scheduled at 12:00 PM at this time. Will give medications once patient is back in ED Holding.

## 2023-12-27 NOTE — ED ADULT NURSE NOTE - NSFALLRISKINTERV_ED_ALL_ED
Assistance OOB with selected safe patient handling equipment if applicable/Assistance with ambulation/Communicate fall risk and risk factors to all staff, patient, and family/Provide visual cue: yellow wristband, yellow gown, etc/Reinforce activity limits and safety measures with patient and family/Call bell, personal items and telephone in reach/Instruct patient to call for assistance before getting out of bed/chair/stretcher/Non-slip footwear applied when patient is off stretcher/[place] to call system/Physically safe environment - no spills, clutter or unnecessary equipment/Purposeful Proactive Rounding/Room/bathroom lighting operational, light cord in reach Assistance OOB with selected safe patient handling equipment if applicable/Assistance with ambulation/Communicate fall risk and risk factors to all staff, patient, and family/Provide visual cue: yellow wristband, yellow gown, etc/Reinforce activity limits and safety measures with patient and family/Call bell, personal items and telephone in reach/Instruct patient to call for assistance before getting out of bed/chair/stretcher/Non-slip footwear applied when patient is off stretcher/[place] to call system/Physically safe environment - no spills, clutter or unnecessary equipment/Purposeful Proactive Rounding/Room/bathroom lighting operational, light cord in reach

## 2023-12-27 NOTE — CONSULT NOTE ADULT - SUBJECTIVE AND OBJECTIVE BOX
[facility] : Progress Note :: [phone], (office [phone]),   Dr Estrada / Dr Das / Dr Gale / Dr Vega / Dr Debbie FORD / Dr Garcia / Dr Chandra / Dr Jona pal  _____________________________________________________________________________________________  Patient is a 53y Female whom presented to the hospital with shortness of breath and a sense that she is drowning in her lungs. She is relatively new to HD at [facility] [place]. She is on HD MWF, missed HD yesterday . In ED, with hypertensive emergency with pulmonary edema.  Renal consulted for emergent HD became hypotensive at end of HD, hemodialysis stopped 20 minutes before scheduled end of HD. . Seen post HD. complains  of worsening  diabetic neuropathic pain.  She feels  much better with resolution  of SOB .    PAST MEDICAL & SURGICAL HISTORY:  Sarcoidosis      Diabetes      Hypertension      Hyperlipidemia      Chronic kidney disease (CKD), stage III (moderate)      Seizure disorder      No significant past surgical history        No Known Allergies    Home Medications Reviewed  Hospital Medications:   MEDICATIONS  (STANDING):  atorvastatin 80 milliGRAM(s) Oral at bedtime  epoetin suzette (PROCRIT) Injectable 4000 Unit(s) IV Push <User Schedule>  ferrous    sulfate 325 milliGRAM(s) Oral daily  gabapentin 300 milliGRAM(s) Oral daily  heparin   Injectable 5000 Unit(s) SubCutaneous every 8 hours  hydrALAZINE 50 milliGRAM(s) Oral three times a day  insulin lispro (ADMELOG) corrective regimen sliding scale   SubCutaneous at bedtime  insulin lispro (ADMELOG) corrective regimen sliding scale   SubCutaneous three times a day before meals  metolazone 5 milliGRAM(s) Oral <User Schedule>  metoprolol tartrate 12.5 milliGRAM(s) Oral two times a day  predniSONE   Tablet 10 milliGRAM(s) Oral daily  sevelamer carbonate 800 milliGRAM(s) Oral three times a day with meals  traZODone 50 milliGRAM(s) Oral at bedtime    SOCIAL HISTORY:  Denies ETOh,Smoking,   FAMILY HISTORY:  Family history of diabetes mellitus in first degree relative        VITALS:  T(F): 98 (12-27-23 @ 16:45), Max: 98.3 (12-27-23 @ 10:32)  HR: 92 (12-27-23 @ 16:50)  BP: 197/99 (12-27-23 @ 16:50)  RR: 20 (12-27-23 @ 16:45)  SpO2: 100% (12-27-23 @ 16:45)  Wt(kg): --    Height (cm): 155 (12-27 @ 04:23)  Weight (kg): 47 (12-27 @ 04:23)  BMI (kg/m2): 19.6 (12-27 @ 04:23)  BSA (m2): 1.43 (12-27 @ 04:23)    PHYSICAL EXAM:  Constitutional: NAD  HEENT: anicteric sclera, oropharynx clear, MMM  Neck: No JVD  Respiratory: CTAB, no wheezes, rales or rhonchi  Cardiovascular: S1, S2, RRR  Gastrointestinal: BS+, soft, NT/ND  Extremities: No peripheral edema  Neurological: A/O x 3, no focal deficits  : No CVA tenderness. No rai.   Vascular Access: RT IJ permacath.    LABS:  12-27    139  |  103  |  45<H>  ----------------------------<  166<H>  4.2   |  24  |  9.74<H>    Ca    8.7      27 Dec 2023 05:10  Phos  5.7     12-27      Creatinine Trend: 9.74 <--                        11.4   10.22 )-----------( 307      ( 27 Dec 2023 05:10 )             36.1     Urine Studies:  Urinalysis Basic - ( 27 Dec 2023 05:10 )    Color:  / Appearance:  / SG:  / pH:   Gluc: 166 mg/dL / Ketone:   / Bili:  / Urobili:    Blood:  / Protein:  / Nitrite:    Leuk Esterase:  / RBC:  / WBC    Sq Epi:  / Non Sq Epi:  / Bacteria:         RADIOLOGY & ADDITIONAL STUDIES:                 [facility] : Progress Note :: [phone], (office [phone]),   Dr Estrada / Dr Das / Dr Gale / Dr Vega / Dr Debbie FORD / Dr Garcia / Dr Chandra / Dr Jona pal  _____________________________________________________________________________________________  Patient is a 53y Female whom presented to the hospital with shortness of breath and a sense that she is drowning in her lungs. She is relatively new to HD at [facility] [place]. She is on HD MWF, missed HD yesterday . In ED, with hypertensive emergency with pulmonary edema.  Renal consulted for emergent HD became hypotensive at end of HD, hemodialysis stopped 20 minutes before scheduled end of HD. . Seen post HD. complains  of worsening  diabetic neuropathic pain.  She feels  much better with resolution  of SOB .    PAST MEDICAL & SURGICAL HISTORY:  Sarcoidosis      Diabetes      Hypertension      Hyperlipidemia      Chronic kidney disease (CKD), stage III (moderate)      Seizure disorder      No significant past surgical history        No Known Allergies    Home Medications Reviewed  Hospital Medications:   MEDICATIONS  (STANDING):  atorvastatin 80 milliGRAM(s) Oral at bedtime  epoetin suzette (PROCRIT) Injectable 4000 Unit(s) IV Push <User Schedule>  ferrous    sulfate 325 milliGRAM(s) Oral daily  gabapentin 300 milliGRAM(s) Oral daily  heparin   Injectable 5000 Unit(s) SubCutaneous every 8 hours  hydrALAZINE 50 milliGRAM(s) Oral three times a day  insulin lispro (ADMELOG) corrective regimen sliding scale   SubCutaneous at bedtime  insulin lispro (ADMELOG) corrective regimen sliding scale   SubCutaneous three times a day before meals  metolazone 5 milliGRAM(s) Oral <User Schedule>  metoprolol tartrate 12.5 milliGRAM(s) Oral two times a day  predniSONE   Tablet 10 milliGRAM(s) Oral daily  sevelamer carbonate 800 milliGRAM(s) Oral three times a day with meals  traZODone 50 milliGRAM(s) Oral at bedtime    SOCIAL HISTORY:  Denies ETOh,Smoking,   FAMILY HISTORY:  Family history of diabetes mellitus in first degree relative        VITALS:  T(F): 98 (12-27-23 @ 16:45), Max: 98.3 (12-27-23 @ 10:32)  HR: 92 (12-27-23 @ 16:50)  BP: 197/99 (12-27-23 @ 16:50)  RR: 20 (12-27-23 @ 16:45)  SpO2: 100% (12-27-23 @ 16:45)  Wt(kg): --    Height (cm): 155 (12-27 @ 04:23)  Weight (kg): 47 (12-27 @ 04:23)  BMI (kg/m2): 19.6 (12-27 @ 04:23)  BSA (m2): 1.43 (12-27 @ 04:23)    PHYSICAL EXAM:  Constitutional: NAD  HEENT: anicteric sclera, oropharynx clear, MMM  Neck: No JVD  Respiratory: CTAB, no wheezes, rales or rhonchi  Cardiovascular: S1, S2, RRR  Gastrointestinal: BS+, soft, NT/ND  Extremities: No peripheral edema  Neurological: A/O x 3, no focal deficits  : No CVA tenderness. No rai.   Vascular Access: RT IJ permacath.    LABS:  12-27    139  |  103  |  45<H>  ----------------------------<  166<H>  4.2   |  24  |  9.74<H>    Ca    8.7      27 Dec 2023 05:10  Phos  5.7     12-27      Creatinine Trend: 9.74 <--                        11.4   10.22 )-----------( 307      ( 27 Dec 2023 05:10 )             36.1     Urine Studies:  Urinalysis Basic - ( 27 Dec 2023 05:10 )    Color:  / Appearance:  / SG:  / pH:   Gluc: 166 mg/dL / Ketone:   / Bili:  / Urobili:    Blood:  / Protein:  / Nitrite:    Leuk Esterase:  / RBC:  / WBC    Sq Epi:  / Non Sq Epi:  / Bacteria:         RADIOLOGY & ADDITIONAL STUDIES:

## 2023-12-27 NOTE — PHARMACOTHERAPY INTERVENTION NOTE - COMMENTS
[facility]  Progress Note        Quang Reed Patient Status:  Inpatient    3/5/1935 MRN [id number]   [facility] 6NE-A Attending Sheryl Erickson MD   Hosp Day # 2 PCP Ketty Etienne MD       Subjective:   Intubated, sedated    Objecti (05/26/21 0902)   • Nitroglycerin in D5W 60 mcg/min (05/26/21 9140)   • norepinephrine     • nitroprusside (NIPRIDE) 50 mg in D5W infusion     • Dextrose-NaCl 70 mL/hr (05/26/21 1961)       Assessment:    · Cad s/p cabg x 3 (lima to lad, svg to om1, svg to Scheduled with meals

## 2023-12-27 NOTE — ED ADULT NURSE REASSESSMENT NOTE - NS ED NURSE REASSESS COMMENT FT1
Late note due to pt care. Pt received form previous RN. Pt A&Ox4, respirations even and unlabored, skin warm and dry. Pt to ED for SOB, SpO2 96% on 2L NC, pt denies home O2 use. Pt denies SOB, chest pain or headache at this time. Pt's bp 221/113, MD Mccabe aware, medication administered per MAR. Pt received dialysis M/W/F, last full session was last week Friday. Pt w/ right chest port. Pt updated on plan of care, pt verbalized understanding.

## 2023-12-27 NOTE — H&P ADULT - HISTORY OF PRESENT ILLNESS
Patient is a 53 year old female from home, ambulates independently, with PMH of HTN, DM2, ESRD on HD M/W/F, sarcoidosis, anemia, seizure disorder (no longer on meds) who presented to the ED with shortness of breath.  Patient states she had her HD session scheduled for yesterday however she was out late and missed her HD session.  Patient states she was in communication with her HD center and was planning on possibly coming into the center today for HD however when she informed them she was short of breath they told her to come to the hospital  Patient was recently admitted to [facility] for similar reason about 1-2 weeks ago and was discharged after 2 days.  Patient denies nausea, vomiting, headache, blurry vision, dizziness, chest pain, abdominal pain, constipation, diarrhea, leg swelling.  Patient is a 53 year old female from home, ambulates independently, with PMH of HTN, DM2, ESRD on HD M/W/F, sarcoidosis, anemia, seizure disorder (no longer on meds) who presented to the ED with shortness of breath.  Patient states she had her HD session scheduled for yesterday however she was out late and missed her HD session.  Patient states she was in communication with her HD center and was planning on possibly coming into the center today for HD however when she informed them she was short of breath they told her to come to the hospital  Patient was recently admitted to [facility] for similar reason about 1-2 weeks ago and was discharged after 2 days.  Patient denies nausea, vomiting, headache, blurry vision, dizziness, chest pain, abdominal pain, constipation, diarrhea, leg swelling.

## 2023-12-27 NOTE — H&P ADULT - PROBLEM SELECTOR PLAN 3
- Patient with history of DM  - Patient on Lantus 17U qhs and Novolg 10u with meals at home  - Start patient on Insulin sliding scale ACHS  - Start patient on Finger sticks ACHS  - Continue with Lantus 14U and Admelog 7U with meals  - Start Diabetic Diet - Patient with history of DM  - Patient was on Lantus 17U qhs and Novolg 10u with meals at home however this was recently held and patient is now on just trulicity at home  - Start patient on Insulin sliding scale ACHS  - Start patient on Finger sticks ACHS  - Resume prior insulin dosing as indicated based on patients BG  - Start Diabetic Diet

## 2023-12-27 NOTE — ED ADULT NURSE NOTE - OBJECTIVE STATEMENT
53-year-old female chief complaint of shortness of breath progressively worsening for past 3 days.  Patient receives hemodialysis Monday Wednesday Friday and states last dialysis was on 12/22/2023 and admits to missing dialysis on 12/25/2023.

## 2023-12-27 NOTE — H&P ADULT - ASSESSMENT
Patient is a 53 year old female from home, ambulates independently, with PMH of HTN, DM2, ESRD on HD M/W/F, sarcoidosis, anemia, seizure disorder (no longer on meds) who presented to the ED with shortness of breath.  In the ED patient was Hypertensive with SBP > 200.  Patients lab work showing normal electorlytes and SCr of 9.74.  patient is being admitted for hypertensive urgency and missed HD sessions.

## 2023-12-27 NOTE — H&P ADULT - PROBLEM SELECTOR PLAN 2
- History of HTN   - On metoprolol, Hydralazine, and amlodipine at home  - now in hypertensive urgency with SBP > 200  - will given labetalol 5mg now (nephro recommending lower dose correction due to HD session coming in AM)  - will continue to monitor and restart home meds

## 2023-12-27 NOTE — CONSULT NOTE ADULT - ASSESSMENT
Patient is a 53y Female whom presented to the hospital with shortness of breath and a sense that she is drowning in her lungs. She is relatively new to HD at [facility]. She is on HD MWF, missed HD yesterday . In ED, with hypertensive emergency with pulmonary edema.  Renal consulted for emergent HD became hypotensive at end of HD, hemodialysis stopped 20 minutes before scheduled end of HD. . Seen post HD. complains  of worsening  diabetic neuropathic pain.  She feels  much better with resolution  of SOB .  # ESRD. admitted with pulmonary edema, high BP sec to missed HD.  s/p HD earlier with improvement of BP and SOB.  discussed compliance with HD   will do extra HD in AM as well as is her outpt schedule.  # peripheral neuropathy. resumed gabapentin.  # HTN. blood pressure better after HD. BP meds resumed.   Patient is a 53y Female whom presented to the hospital with shortness of breath and a sense that she is drowning in her lungs. She is relatively new to HD at [facility]. She is on HD MWF, missed HD yesterday . In ED, with hypertensive emergency with pulmonary edema.  Renal consulted for emergent HD became hypotensive at end of HD, hemodialysis stopped 20 minutes before scheduled end of HD. . Seen post HD. complains  of worsening  diabetic neuropathic pain.  She feels  much better with resolution  of SOB .  # ESRD. admitted with pulmonary edema, high BP sec to missed HD.  s/p HD earlier with improvement of BP and SOB.  discussed compliance with HD   will do extra HD in AM as well as is her outpt schedule.  # peripheral neuropathy. resumed gabapentin.  # HTN. blood pressure better after HD. BP meds resumed.

## 2023-12-27 NOTE — ED PROVIDER NOTE - OBJECTIVE STATEMENT
53-year-old female chief complaint of shortness of breath progressively worsening for past 3 days.  Patient receives hemodialysis Monday Wednesday Friday and states last dialysis was on 12/22/2023 and admits to missing dialysis on 12/25/2023.  Patient denies fever, no reported chest pain, no vomiting.  7:20 AM nephrologist Dr. Das endorsed and will arrange inpatient hemodialysis.    Instructed by Dr. Das to admit pt to Dr. Meyer's service.

## 2023-12-27 NOTE — PROGRESS NOTE ADULT - SUBJECTIVE AND OBJECTIVE BOX
Patient is a 53y old  Female who presents with a chief complaint of Hypertensive urgency 2/2 Missed HD sessions (27 Dec 2023 08:46)/hypoxia      INTERVAL HPI/OVERNIGHT EVENTS:  T(C): 36.8 (12-27-23 @ 10:32), Max: 36.8 (12-27-23 @ 10:32)  HR: 88 (12-27-23 @ 10:32) (88 - 96)  BP: 195/97 (12-27-23 @ 10:32) (195/97 - 221/113)  RR: 26 (12-27-23 @ 10:32) (26 - 26)  SpO2: 97% (12-27-23 @ 10:32) (94% - 97%)  Wt(kg): --    LABS:                        11.4   10.22 )-----------( 307      ( 27 Dec 2023 05:10 )             36.1     12-27    139  |  103  |  45<H>  ----------------------------<  166<H>  4.2   |  24  |  9.74<H>    Ca    8.7      27 Dec 2023 05:10        Urinalysis Basic - ( 27 Dec 2023 05:10 )    Color: x / Appearance: x / SG: x / pH: x  Gluc: 166 mg/dL / Ketone: x  / Bili: x / Urobili: x   Blood: x / Protein: x / Nitrite: x   Leuk Esterase: x / RBC: x / WBC x   Sq Epi: x / Non Sq Epi: x / Bacteria: x      CAPILLARY BLOOD GLUCOSE      POCT Blood Glucose.: 131 mg/dL (27 Dec 2023 10:54)  POCT Blood Glucose.: 183 mg/dL (27 Dec 2023 04:45)        RADIOLOGY & ADDITIONAL TESTS:    Consultant(s) Notes Reviewed:  [x ] YES  [ ] NO    PHYSICAL EXAM:  GENERAL: well built, well nourished  HEAD:  Atraumatic, Normocephalic  EYES: EOMI, PERRLA, conjunctiva and sclera clear  ENT: No tonsillar erythema, exudates, or enlargement; Moist mucous membranes, Good dentition, No lesions  NECK: Supple, No JVD, Normal thyroid, no enlarged nodes  NERVOUS SYSTEM:  Alert & Oriented X3, Good concentration; Motor Strength 5/5 B/L upper and lower extremities; DTRs 2+ intact and symmetric, sensory intact  CHEST/LUNG: B/L good air entry; No rales, rhonchi, or wheezing  HEART: S1S2 normal, no S3, Regular rate and rhythm; No murmurs, rubs, or gallops  ABDOMEN: Soft, Nontender, Nondistended; Bowel sounds present  EXTREMITIES:  2+ Peripheral Pulses, No clubbing, cyanosis, or edema  LYMPH: No lymphadenopathy noted  SKIN: No rashes or lesions    Care Discussed with Consultants/Other Providers [ x] YES  [ ] NO

## 2023-12-27 NOTE — H&P ADULT - PROBLEM SELECTOR PLAN 1
- Patient missed HD session yesterday   - Patient Short of breath, worse when lying down  - CXR pending official read  - Hypertensive urgency with SBP >200  - Labs with normal Na, K  - Patient will have HD session this AM  - Nephrology consulted Dr. Estrada

## 2023-12-27 NOTE — ED PROVIDER NOTE - CLINICAL SUMMARY MEDICAL DECISION MAKING FREE TEXT BOX
JADA Ryder and Dr. Das (nephro) endorsed, pt admitted for in pt HD.  I had a detailed discussion with the patient and/or guardian regarding the historical points, exam findings, and any diagnostic results supporting the admit diagnosis.

## 2023-12-27 NOTE — ED ADULT TRIAGE NOTE - CCCP TRG CHIEF CMPLNT
C/O SOB '' I FEEL LIKE I'M DROWNING ,I'M DUE FOR DIALYSIS  TODAY '' AS PER PT STATED/shortness of breath

## 2023-12-27 NOTE — H&P ADULT - NSHPPHYSICALEXAM_GEN_ALL_CORE
T(C): 36.5 (12-27-23 @ 07:34), Max: 36.6 (12-27-23 @ 04:23)  T(F): 97.7 (12-27-23 @ 07:34), Max: 97.8 (12-27-23 @ 04:23)  HR: 96 (12-27-23 @ 07:34) (94 - 96)  BP: 221/113 (12-27-23 @ 07:34) (205/111 - 221/113)  RR: 26 (12-27-23 @ 07:34) (26 - 26)  SpO2: 94% (12-27-23 @ 07:34) (94% - 94%)    GENERAL: NAD; lying in bed  HEAD:  Atraumatic, Normocephalic  EYES: EOMI, PERRLA, conjunctiva and sclera clear  ENMT: No tonsillar erythema, exudates, or enlargement; Poor dentin   NECK: Supple, normal appearance, No JVD; Normal thyroid;   NERVOUS SYSTEM:  Alert & Oriented X3,  Motor Strength 5/5 B/L upper and lower extremities, sensation intact  CHEST/LUNG: Bibasilar rails  HEART: Regular rate and rhythm; No murmurs, rubs, or gallops  ABDOMEN: Soft, Nontender, Nondistended; Bowel sounds present  EXTREMITIES:  2+ Peripheral Pulses, No clubbing, cyanosis, or edema  SKIN: No rashes or lesions; T(C): 36.5 (12-27-23 @ 07:34), Max: 36.6 (12-27-23 @ 04:23)  T(F): 97.7 (12-27-23 @ 07:34), Max: 97.8 (12-27-23 @ 04:23)  HR: 96 (12-27-23 @ 07:34) (94 - 96)  BP: 221/113 (12-27-23 @ 07:34) (205/111 - 221/113)  RR: 26 (12-27-23 @ 07:34) (26 - 26)  SpO2: 94% (12-27-23 @ 07:34) (94% - 94%)    GENERAL: NAD; lying in bed; + Anterior chest wall perma cath for HD (~2 months old)  HEAD:  Atraumatic, Normocephalic  EYES: EOMI, PERRLA, conjunctiva and sclera clear  ENMT: No tonsillar erythema, exudates, or enlargement; Poor dentin   NECK: Supple, normal appearance, No JVD; Normal thyroid;   NERVOUS SYSTEM:  Alert & Oriented X3,  Motor Strength 5/5 B/L upper and lower extremities, sensation intact  CHEST/LUNG: Bibasilar rails  HEART: Regular rate and rhythm; No murmurs, rubs, or gallops  ABDOMEN: Soft, Nontender, Nondistended; Bowel sounds present  EXTREMITIES:  2+ Peripheral Pulses, No clubbing, cyanosis, or edema  SKIN: No rashes or lesions;

## 2023-12-28 ENCOUNTER — APPOINTMENT (OUTPATIENT)
Dept: INTERNAL MEDICINE | Facility: CLINIC | Age: 53
End: 2023-12-28

## 2023-12-28 ENCOUNTER — TRANSCRIPTION ENCOUNTER (OUTPATIENT)
Age: 53
End: 2023-12-28

## 2023-12-28 LAB
A1C WITH ESTIMATED AVERAGE GLUCOSE RESULT: 6.6 % — HIGH (ref 4–5.6)
A1C WITH ESTIMATED AVERAGE GLUCOSE RESULT: 6.6 % — HIGH (ref 4–5.6)
ANION GAP SERPL CALC-SCNC: 7 MMOL/L — SIGNIFICANT CHANGE UP (ref 5–17)
ANION GAP SERPL CALC-SCNC: 7 MMOL/L — SIGNIFICANT CHANGE UP (ref 5–17)
BUN SERPL-MCNC: 24 MG/DL — HIGH (ref 7–18)
BUN SERPL-MCNC: 24 MG/DL — HIGH (ref 7–18)
CALCIUM SERPL-MCNC: 7.9 MG/DL — LOW (ref 8.4–10.5)
CALCIUM SERPL-MCNC: 7.9 MG/DL — LOW (ref 8.4–10.5)
CHLORIDE SERPL-SCNC: 99 MMOL/L — SIGNIFICANT CHANGE UP (ref 96–108)
CHLORIDE SERPL-SCNC: 99 MMOL/L — SIGNIFICANT CHANGE UP (ref 96–108)
CO2 SERPL-SCNC: 28 MMOL/L — SIGNIFICANT CHANGE UP (ref 22–31)
CO2 SERPL-SCNC: 28 MMOL/L — SIGNIFICANT CHANGE UP (ref 22–31)
CREAT SERPL-MCNC: 6 MG/DL — HIGH (ref 0.5–1.3)
CREAT SERPL-MCNC: 6 MG/DL — HIGH (ref 0.5–1.3)
EGFR: 8 ML/MIN/1.73M2 — LOW
EGFR: 8 ML/MIN/1.73M2 — LOW
ESTIMATED AVERAGE GLUCOSE: 143 MG/DL — HIGH (ref 68–114)
ESTIMATED AVERAGE GLUCOSE: 143 MG/DL — HIGH (ref 68–114)
GLUCOSE BLDC GLUCOMTR-MCNC: 120 MG/DL — HIGH (ref 70–99)
GLUCOSE BLDC GLUCOMTR-MCNC: 120 MG/DL — HIGH (ref 70–99)
GLUCOSE BLDC GLUCOMTR-MCNC: 150 MG/DL — HIGH (ref 70–99)
GLUCOSE BLDC GLUCOMTR-MCNC: 150 MG/DL — HIGH (ref 70–99)
GLUCOSE BLDC GLUCOMTR-MCNC: 171 MG/DL — HIGH (ref 70–99)
GLUCOSE BLDC GLUCOMTR-MCNC: 171 MG/DL — HIGH (ref 70–99)
GLUCOSE BLDC GLUCOMTR-MCNC: 225 MG/DL — HIGH (ref 70–99)
GLUCOSE BLDC GLUCOMTR-MCNC: 225 MG/DL — HIGH (ref 70–99)
GLUCOSE BLDC GLUCOMTR-MCNC: 275 MG/DL — HIGH (ref 70–99)
GLUCOSE BLDC GLUCOMTR-MCNC: 275 MG/DL — HIGH (ref 70–99)
GLUCOSE SERPL-MCNC: 120 MG/DL — HIGH (ref 70–99)
GLUCOSE SERPL-MCNC: 120 MG/DL — HIGH (ref 70–99)
HCT VFR BLD CALC: 34.5 % — SIGNIFICANT CHANGE UP (ref 34.5–45)
HCT VFR BLD CALC: 34.5 % — SIGNIFICANT CHANGE UP (ref 34.5–45)
HGB BLD-MCNC: 10.8 G/DL — LOW (ref 11.5–15.5)
HGB BLD-MCNC: 10.8 G/DL — LOW (ref 11.5–15.5)
MAGNESIUM SERPL-MCNC: 2.4 MG/DL — SIGNIFICANT CHANGE UP (ref 1.6–2.6)
MAGNESIUM SERPL-MCNC: 2.4 MG/DL — SIGNIFICANT CHANGE UP (ref 1.6–2.6)
MCHC RBC-ENTMCNC: 27.8 PG — SIGNIFICANT CHANGE UP (ref 27–34)
MCHC RBC-ENTMCNC: 27.8 PG — SIGNIFICANT CHANGE UP (ref 27–34)
MCHC RBC-ENTMCNC: 31.3 GM/DL — LOW (ref 32–36)
MCHC RBC-ENTMCNC: 31.3 GM/DL — LOW (ref 32–36)
MCV RBC AUTO: 88.7 FL — SIGNIFICANT CHANGE UP (ref 80–100)
MCV RBC AUTO: 88.7 FL — SIGNIFICANT CHANGE UP (ref 80–100)
NRBC # BLD: 0 /100 WBCS — SIGNIFICANT CHANGE UP (ref 0–0)
NRBC # BLD: 0 /100 WBCS — SIGNIFICANT CHANGE UP (ref 0–0)
PHOSPHATE SERPL-MCNC: 4.7 MG/DL — HIGH (ref 2.5–4.5)
PHOSPHATE SERPL-MCNC: 4.7 MG/DL — HIGH (ref 2.5–4.5)
PLATELET # BLD AUTO: 247 K/UL — SIGNIFICANT CHANGE UP (ref 150–400)
PLATELET # BLD AUTO: 247 K/UL — SIGNIFICANT CHANGE UP (ref 150–400)
POTASSIUM SERPL-MCNC: 3.8 MMOL/L — SIGNIFICANT CHANGE UP (ref 3.5–5.3)
POTASSIUM SERPL-MCNC: 3.8 MMOL/L — SIGNIFICANT CHANGE UP (ref 3.5–5.3)
POTASSIUM SERPL-SCNC: 3.8 MMOL/L — SIGNIFICANT CHANGE UP (ref 3.5–5.3)
POTASSIUM SERPL-SCNC: 3.8 MMOL/L — SIGNIFICANT CHANGE UP (ref 3.5–5.3)
RBC # BLD: 3.89 M/UL — SIGNIFICANT CHANGE UP (ref 3.8–5.2)
RBC # BLD: 3.89 M/UL — SIGNIFICANT CHANGE UP (ref 3.8–5.2)
RBC # FLD: 16.8 % — HIGH (ref 10.3–14.5)
RBC # FLD: 16.8 % — HIGH (ref 10.3–14.5)
SODIUM SERPL-SCNC: 134 MMOL/L — LOW (ref 135–145)
SODIUM SERPL-SCNC: 134 MMOL/L — LOW (ref 135–145)
WBC # BLD: 6.54 K/UL — SIGNIFICANT CHANGE UP (ref 3.8–10.5)
WBC # BLD: 6.54 K/UL — SIGNIFICANT CHANGE UP (ref 3.8–10.5)
WBC # FLD AUTO: 6.54 K/UL — SIGNIFICANT CHANGE UP (ref 3.8–10.5)
WBC # FLD AUTO: 6.54 K/UL — SIGNIFICANT CHANGE UP (ref 3.8–10.5)

## 2023-12-28 RX ORDER — GABAPENTIN 400 MG/1
300 CAPSULE ORAL
Refills: 0 | Status: DISCONTINUED | OUTPATIENT
Start: 2023-12-28 | End: 2023-12-29

## 2023-12-28 RX ORDER — CHLORHEXIDINE GLUCONATE 213 G/1000ML
1 SOLUTION TOPICAL
Refills: 0 | Status: DISCONTINUED | OUTPATIENT
Start: 2023-12-28 | End: 2023-12-29

## 2023-12-28 RX ORDER — HYDROMORPHONE HYDROCHLORIDE 2 MG/ML
0.5 INJECTION INTRAMUSCULAR; INTRAVENOUS; SUBCUTANEOUS ONCE
Refills: 0 | Status: DISCONTINUED | OUTPATIENT
Start: 2023-12-28 | End: 2023-12-28

## 2023-12-28 RX ORDER — GABAPENTIN 400 MG/1
300 CAPSULE ORAL
Refills: 0 | Status: DISCONTINUED | OUTPATIENT
Start: 2023-12-28 | End: 2023-12-28

## 2023-12-28 RX ORDER — MUPIROCIN 20 MG/G
1 OINTMENT TOPICAL
Refills: 0 | Status: DISCONTINUED | OUTPATIENT
Start: 2023-12-28 | End: 2023-12-29

## 2023-12-28 RX ORDER — NIFEDIPINE 30 MG
60 TABLET, EXTENDED RELEASE 24 HR ORAL DAILY
Refills: 0 | Status: DISCONTINUED | OUTPATIENT
Start: 2023-12-28 | End: 2023-12-29

## 2023-12-28 RX ORDER — ACETAMINOPHEN 500 MG
700 TABLET ORAL ONCE
Refills: 0 | Status: COMPLETED | OUTPATIENT
Start: 2023-12-28 | End: 2023-12-28

## 2023-12-28 RX ORDER — LABETALOL HCL 100 MG
10 TABLET ORAL ONCE
Refills: 0 | Status: COMPLETED | OUTPATIENT
Start: 2023-12-28 | End: 2023-12-28

## 2023-12-28 RX ORDER — INFLUENZA VIRUS VACCINE 15; 15; 15; 15 UG/.5ML; UG/.5ML; UG/.5ML; UG/.5ML
0.5 SUSPENSION INTRAMUSCULAR ONCE
Refills: 0 | Status: DISCONTINUED | OUTPATIENT
Start: 2023-12-28 | End: 2023-12-29

## 2023-12-28 RX ADMIN — HYDROMORPHONE HYDROCHLORIDE 0.5 MILLIGRAM(S): 2 INJECTION INTRAMUSCULAR; INTRAVENOUS; SUBCUTANEOUS at 03:30

## 2023-12-28 RX ADMIN — SEVELAMER CARBONATE 800 MILLIGRAM(S): 2400 POWDER, FOR SUSPENSION ORAL at 18:27

## 2023-12-28 RX ADMIN — GABAPENTIN 300 MILLIGRAM(S): 400 CAPSULE ORAL at 11:05

## 2023-12-28 RX ADMIN — ATORVASTATIN CALCIUM 80 MILLIGRAM(S): 80 TABLET, FILM COATED ORAL at 21:47

## 2023-12-28 RX ADMIN — Medication 12.5 MILLIGRAM(S): at 18:28

## 2023-12-28 RX ADMIN — Medication 280 MILLIGRAM(S): at 01:02

## 2023-12-28 RX ADMIN — GABAPENTIN 300 MILLIGRAM(S): 400 CAPSULE ORAL at 23:24

## 2023-12-28 RX ADMIN — Medication 10 MILLIGRAM(S): at 06:54

## 2023-12-28 RX ADMIN — HYDROMORPHONE HYDROCHLORIDE 0.5 MILLIGRAM(S): 2 INJECTION INTRAMUSCULAR; INTRAVENOUS; SUBCUTANEOUS at 15:03

## 2023-12-28 RX ADMIN — Medication 50 MILLIGRAM(S): at 21:48

## 2023-12-28 RX ADMIN — Medication 700 MILLIGRAM(S): at 01:30

## 2023-12-28 RX ADMIN — Medication 60 MILLIGRAM(S): at 11:05

## 2023-12-28 RX ADMIN — Medication 10 MILLIGRAM(S): at 06:47

## 2023-12-28 RX ADMIN — Medication 325 MILLIGRAM(S): at 11:05

## 2023-12-28 RX ADMIN — HYDROMORPHONE HYDROCHLORIDE 0.5 MILLIGRAM(S): 2 INJECTION INTRAMUSCULAR; INTRAVENOUS; SUBCUTANEOUS at 14:06

## 2023-12-28 RX ADMIN — HEPARIN SODIUM 5000 UNIT(S): 5000 INJECTION INTRAVENOUS; SUBCUTANEOUS at 05:38

## 2023-12-28 RX ADMIN — Medication 50 MILLIGRAM(S): at 05:38

## 2023-12-28 RX ADMIN — HEPARIN SODIUM 5000 UNIT(S): 5000 INJECTION INTRAVENOUS; SUBCUTANEOUS at 21:49

## 2023-12-28 RX ADMIN — MUPIROCIN 1 APPLICATION(S): 20 OINTMENT TOPICAL at 18:27

## 2023-12-28 RX ADMIN — Medication 12.5 MILLIGRAM(S): at 05:38

## 2023-12-28 RX ADMIN — Medication 50 MILLIGRAM(S): at 21:47

## 2023-12-28 RX ADMIN — HYDROMORPHONE HYDROCHLORIDE 0.5 MILLIGRAM(S): 2 INJECTION INTRAMUSCULAR; INTRAVENOUS; SUBCUTANEOUS at 02:44

## 2023-12-28 RX ADMIN — Medication 50 MILLIGRAM(S): at 13:16

## 2023-12-28 RX ADMIN — SEVELAMER CARBONATE 800 MILLIGRAM(S): 2400 POWDER, FOR SUSPENSION ORAL at 08:06

## 2023-12-28 NOTE — PROGRESS NOTE ADULT - PROBLEM SELECTOR PLAN 4
- History of sarcoidosis on prednisone 10mg qd  - Continue home meds Pt w History of sarcoidosis on prednisone 10mg qd  - Continue home meds

## 2023-12-28 NOTE — PROGRESS NOTE ADULT - SUBJECTIVE AND OBJECTIVE BOX
Patient is a 53y old  Female who presents with a chief complaint of Hypertensive urgency 2/2 Missed HD sessions (27 Dec 2023 18:48)/hypoxia/fluid overload      INTERVAL HPI/OVERNIGHT EVENTS:  T(C): 36.9 (12-28-23 @ 07:36), Max: 37 (12-28-23 @ 00:17)  HR: 79 (12-28-23 @ 07:36) (79 - 97)  BP: 146/88 (12-28-23 @ 07:36) (146/88 - 197/99)  RR: 18 (12-28-23 @ 08:05) (15 - 26)  SpO2: 97% (12-28-23 @ 08:05) (97% - 100%)  Wt(kg): --    LABS:                        10.8   6.54  )-----------( 247      ( 28 Dec 2023 06:34 )             34.5     12-28    134<L>  |  99  |  24<H>  ----------------------------<  120<H>  3.8   |  28  |  6.00<H>    Ca    7.9<L>      28 Dec 2023 06:34  Phos  4.7     12-28  Mg     2.4     12-28        Urinalysis Basic - ( 28 Dec 2023 06:34 )    Color: x / Appearance: x / SG: x / pH: x  Gluc: 120 mg/dL / Ketone: x  / Bili: x / Urobili: x   Blood: x / Protein: x / Nitrite: x   Leuk Esterase: x / RBC: x / WBC x   Sq Epi: x / Non Sq Epi: x / Bacteria: x      CAPILLARY BLOOD GLUCOSE      POCT Blood Glucose.: 120 mg/dL (28 Dec 2023 07:49)  POCT Blood Glucose.: 146 mg/dL (27 Dec 2023 21:00)  POCT Blood Glucose.: 146 mg/dL (27 Dec 2023 16:54)  POCT Blood Glucose.: 131 mg/dL (27 Dec 2023 10:54)        RADIOLOGY & ADDITIONAL TESTS:    Consultant(s) Notes Reviewed:  [x ] YES  [ ] NO    PHYSICAL EXAM:  GENERAL: well built, well nourished  HEAD:  Atraumatic, Normocephalic  EYES: EOMI, PERRLA, conjunctiva and sclera clear  ENT: No tonsillar erythema, exudates, or enlargement; Moist mucous membranes, Good dentition, No lesions  NECK: Supple, No JVD, Normal thyroid, no enlarged nodes  NERVOUS SYSTEM:  Alert & Oriented X3, Good concentration; Motor Strength 5/5 B/L upper and lower extremities; DTRs 2+ intact and symmetric, sensory intact  CHEST/LUNG: B/L good air entry; No rales, rhonchi, or wheezing  HEART: S1S2 normal, no S3, Regular rate and rhythm; No murmurs, rubs, or gallops  ABDOMEN: Soft, Nontender, Nondistended; Bowel sounds present  EXTREMITIES:  2+ Peripheral Pulses, No clubbing, cyanosis, or edema  LYMPH: No lymphadenopathy noted  SKIN: No rashes or lesions    Care Discussed with Consultants/Other Providers [ x] YES  [ ] NO

## 2023-12-28 NOTE — PATIENT PROFILE ADULT - NSTRANSFERBELONGINGSRESP_GEN_A_NUR
CONSULTATION NOTE  Patient: Nancy Romo Date: 2018   : 1957 Attending: Adrian Putnam MD   60 year old female Author: Jenna Fonseca NP     Admission to Inpatient Unit Date: 2018 at 11:42 AM    Consultation performed for Dr. Rich Vance, hospitalist physician.       This patient was admitted for depression and suicidal ideation per Adrian Putnam MD, who will be managing this patient's inpatient behavioral health treatment.  This consultation is at the request of Adrian Putnam MD, for the purpose of evaluating the patient's medical management needs.      Chief Complaint: Depression and Suicidal Ideation     History of Present Illness: Nancy Romo is a 60 year old  female presenting with depression and suicidal ideation.  She was hospitalized -2018 on a medical unit here at [facility] due to altered mental status (possible intentional medication overdose).  After discharge she started the Partial Hospital Program.  This morning she presented to the Partial Hospital Program accompanied by her  and daughter who voiced concerns that patient attempted to overdose on her medications this morning.  It was decided that patient would be admitted inpatient.  PMH is positive for bipolar 1 disorder, COPD, and chronic back and bilateral shoulder pain.       Active Hospital Problems    Diagnosis Date Noted   • Depression with suicidal ideation 2012     Priority: High   • Bilateral shoulder pain 2014     Priority: Medium   • Low back pain 2013     Priority: Medium   • COPD (chronic obstructive pulmonary disease) (CMS/[facility]) 2012     Priority: Medium     Past Medical History:   Diagnosis Date   • Anxiety    • Bipolar 1 disorder (CMS/[facility])    • COPD (chronic obstructive pulmonary disease) (CMS/[facility])    • Depression    • Gastroesophageal reflux disease    • Gout    • Influenza A    • Left ankle pain    • Other chronic pain     sees pain management (Dr. Aasen)  back and shoulders   • Pneumonia    • Shoulder pain, bilateral     long history since      Past Surgical History:   Procedure Laterality Date   •  section, classic     •  section, low transverse     • Dilation and curettage of uterus      T.O.P.   • Rotator cuff repair      left; x2, Dr. Mejía   • Shoulder arthr w/sup labral ant post lesion repair  2010    right, Dr. Gomes     Social History     Social History   • Marital status:      Spouse name: N/A   • Number of children: N/A   • Years of education: N/A     Social History Main Topics   • Smoking status: Former Smoker     Packs/day: 3.00     Years: 40.00     Types: Cigarettes   • Smokeless tobacco: Never Used   • Alcohol use No   • Drug use: No      Comment: patient denies   • Sexual activity: Not Currently     Partners: Male     Birth control/ protection: Post-menopausal     Other Topics Concern   • Seat Belt Yes   • Self-Exams Yes     Social History Narrative   • None     Family History   Problem Relation Age of Onset   • Diabetes Sister    • Genitourinary Sister      kidney failure,  at 40   • Neurological Disorder Father      parkinson   • Other Other      nephew, kidney and liver transplant needed   • Cancer Maternal Aunt      breast     Prior to Admission medications    Medication Sig Start Date End Date Taking? Authorizing Provider   traZODone (DESYREL) 100 MG tablet Take 1 tablet by mouth nightly. 18  Yes Adrian Putnam MD   escitalopram (LEXAPRO) 20 MG tablet Take 1 tablet by mouth daily. 18  Yes Polo Santiago MD   gabapentin (NEURONTIN) 100 MG capsule Take 1 capsule by mouth 3 times daily as needed (anxiety). 18  Yes Polo Santiago MD   albuterol (VENTOLIN HFA) 108 (90 Base) MCG/ACT inhaler Inhale 2 puffs into the lungs every 4 hours as needed for Shortness of Breath or Wheezing. 18  Yes Stef Valentin,    cycloSPORINE (RESTASIS) 0.05 % ophthalmic emulsion Place 1 drop into  both eyes 2 times daily.   Yes Outside Provider   polyethylene glycol-propylene glycol (SYSTANE) 0.4-0.3 % Solution Apply to eye daily.   Yes Outside Provider   sumatriptan (IMITREX) 100 MG tablet TAKE 1 TABLET BY MOUTH AT ONSET OF MIGRAINE. MAY REPEAT AFTER 2 HOURS IF NEEDED. 7/21/17  Yes Stef Valentin DO   gabapentin (NEURONTIN) 100 MG capsule Take 1 capsule by mouth 3 times daily. 2/23/18   Adrian Putnam MD   traZODone (DESYREL) 50 MG tablet Take 1 tablet by mouth nightly as needed for Sleep. 2/17/18   Polo Santiago MD   dicyclomine (BENTYL) 10 MG capsule Take 1 capsule by mouth 4 times daily (before meals and nightly). 1/23/18   Stef Valentin DO   acyclovir (ZOVIRAX) 5 % ointment Use 4 or 5 times a day to affected area when necessary for cold sore 12/13/17   Stef Valentin DO   diphenoxylate-atropine (LOMOTIL) 2.5-0.025 MG tablet TAKE ONE TABLET BY MOUTH THREE TIMES DAILY AS NEEDED 7/6/17   Stef Valentin DO     ALLERGIES:  Augmentin [amoclan]; Omnicef [cefdinir]; and Risperdal    Review of Systems  Eye Problem(s):no blurred vision, double vision, discharge or pain  ENT Problem(s):no hearing impairment, nasal congestion or sore throat  Cardiovascular problem(s):no chest pain or palpitations  Respiratory problem(s):no shortness of breath or cough, history of COPD, past smoker  Gastro-intestinal problem(s):no nausea, vomiting, constipation or diarrhea  Genito-urinary problem(s):no dysuria, hematuria or frequency  Musculoskeletal problem(s):chronic back and bilateral shoulder pain, requesting Tylenol  Integumentary problem(s):no rashes or wounds  Neurological problem(s):no headache or dizziness  Psychiatric problem(s):depression and suicidal ideation  Endocrine problem(s):stable weight, no signs/symptoms of diabetes  Hematologic and/or Lymphatic problem(s):no bruising or enlarged lymph nodes     Vital Last Value 24 Hour Range   Temperature 97.9 °F (36.6 °C) (02/27/18 1153) Temp  Min: 97.5 °F (36.4 °C)   Max: 97.9 °F (36.6 °C)   Pulse 106 (02/27/18 1153) Pulse  Min: 101  Max: 106   Respiratory 18 (02/27/18 1153) Resp  Min: 16  Max: 18   Non-Invasive  Blood Pressure (!) 148/95 (02/27/18 1153) BP  Min: 131/84  Max: 148/95   O2 Sat   NA   Pulse Oximetry   No Data Recorded     Vital Today Admitted   Weight 74.4 kg (02/27/18 1153) Weight: 74.4 kg (02/27/18 1153)   Height N/A Height: 5' 6\" (167.6 cm) (02/27/18 1153)   BMI N/A BMI (Calculated): 26.53 (02/27/18 1153)     General Appearance:    Anxious, irritable, guarded   Head:    Normocephalic, without obvious abnormality, atraumatic   Eyes:    PER, conjunctiva/corneas clear, EOM's intact, both eyes   Ears:    Normal external ear canals, both ears, no noted hearing loss   Nose:   Nares normal, septum midline, mucosa normal, no drainage or sinus tenderness   Throat:   Lips, mucosa, and tongue normal; teeth and gums normal   Neck:   Supple, symmetrical, trachea midline, no thyroid enlargement, no lymphadenopathy of cervical or supraclavicular areas   Lungs:     Clear to auscultation bilaterally, respirations unlabored   Heart:    Regular rate and rhythm, S1 and S2 normal, no murmur, rub or gallop   Abdomen:     Soft, non-tender, bowel sounds active all four quadrants,     no masses, no organomegaly   Extremities:   Extremities normal, atraumatic, no cyanosis, edema or varicosities   Pulses:   2+ and symmetric radial and pedal pulses    Skin:   Skin color, texture, turgor normal, no rashes or lesions   Neurologic:   Alert and oriented times 4, short and long term memory intact, normal sensation to hands and feet        Lab Results   Component Value Date    SODIUM 143 02/17/2018    POTASSIUM 3.8 02/17/2018    BUN 12 02/17/2018    CREATININE 0.75 02/17/2018    WBC 9.4 02/17/2018    HCT 43.4 02/17/2018    HGB 14.7 02/17/2018     02/17/2018    INR 1.1 02/13/2018    GLUCOSE 97 02/17/2018    TSH 1.221 09/18/2015    BNP <5 02/13/2018     Assessment/Plan:  1. This patient is  medically appropriate for inpatient behavioral health treatment for depression and suicidal ideation.  Psychiatry to follow.  Encouraged participation in group therapy.    2. COPD.  Stable.  Prn Albuterol inhaler.    3. Chronic back and bilateral shoulder pain.  Prn Tylenol.      DVT VTE prophylaxis addressed.     yes

## 2023-12-28 NOTE — PATIENT PROFILE ADULT - FALL HARM RISK - HARM RISK INTERVENTIONS
Assistance with ambulation/Assistance OOB with selected safe patient handling equipment/Communicate Risk of Fall with Harm to all staff/Discuss with provider need for PT consult/Monitor gait and stability/Provide patient with walking aids - walker, cane, crutches/Reinforce activity limits and safety measures with patient and family/Tailored Fall Risk Interventions/Visual Cue: Yellow wristband and red socks/Bed in lowest position, wheels locked, appropriate side rails in place/Call bell, personal items and telephone in reach/Instruct patient to call for assistance before getting out of bed or chair/Non-slip footwear when patient is out of bed/[place] to call system/Physically safe environment - no spills, clutter or unnecessary equipment/Purposeful Proactive Rounding/Room/bathroom lighting operational, light cord in reach Assistance with ambulation/Assistance OOB with selected safe patient handling equipment/Communicate Risk of Fall with Harm to all staff/Discuss with provider need for PT consult/Monitor gait and stability/Provide patient with walking aids - walker, cane, crutches/Reinforce activity limits and safety measures with patient and family/Tailored Fall Risk Interventions/Visual Cue: Yellow wristband and red socks/Bed in lowest position, wheels locked, appropriate side rails in place/Call bell, personal items and telephone in reach/Instruct patient to call for assistance before getting out of bed or chair/Non-slip footwear when patient is out of bed/[place] to call system/Physically safe environment - no spills, clutter or unnecessary equipment/Purposeful Proactive Rounding/Room/bathroom lighting operational, light cord in reach

## 2023-12-28 NOTE — PROGRESS NOTE ADULT - PROBLEM SELECTOR PLAN 1
- Patient missed HD session yesterday   - Patient Short of breath, worse when lying down  - CXR pending official read  - Hypertensive urgency with SBP >200  - Labs with normal Na, K  - Patient will have HD session this AM  - Nephrology consulted Dr. Estrada Patient w PMH of ESRD on HD MWF  - Pt p/w SOB after missing HD session  - Patient Short of breath, worse when lying down  - CXR showing worsening congestion from prior imaging  - Labs with normal Na, K  - Patient s/p HD session 12/27  - HD was scheduled again for 12/28, pt refusing  - Will resume pt on her MWF schedule w HD tomorrow    - Nephrology consulted Dr. Estrada

## 2023-12-28 NOTE — PATIENT PROFILE ADULT - CAREGIVER ADDRESS
[id number] [place] [id number] qihuck [id number] [place] [id number] [id number]

## 2023-12-28 NOTE — PROGRESS NOTE ADULT - SUBJECTIVE AND OBJECTIVE BOX
[facility] : Progress Note :: [phone], (office [phone]),   Dr Estrada / Dr Das / Dr Gale / Dr Vega / Dr Debbie FORD / Dr Garcia / Dr Chandra / Dr Jona pal  _____________________________________________________________________________________________   hypertensive  HD was arranged today but PT declined     No Known Allergies    Hospital Medications:   MEDICATIONS  (STANDING):  atorvastatin 80 milliGRAM(s) Oral at bedtime  chlorhexidine 2% Cloths 1 Application(s) Topical <User Schedule>  epoetin suzette (PROCRIT) Injectable 4000 Unit(s) IV Push <User Schedule>  ferrous    sulfate 325 milliGRAM(s) Oral daily  gabapentin 300 milliGRAM(s) Oral two times a day  heparin   Injectable 5000 Unit(s) SubCutaneous every 8 hours  hydrALAZINE 50 milliGRAM(s) Oral three times a day  influenza   Vaccine 0.5 milliLiter(s) IntraMuscular once  insulin lispro (ADMELOG) corrective regimen sliding scale   SubCutaneous at bedtime  insulin lispro (ADMELOG) corrective regimen sliding scale   SubCutaneous three times a day before meals  metolazone 5 milliGRAM(s) Oral <User Schedule>  metoprolol tartrate 12.5 milliGRAM(s) Oral two times a day  mupirocin 2% Ointment 1 Application(s) Both Nostrils two times a day  NIFEdipine XL 60 milliGRAM(s) Oral daily  predniSONE   Tablet 10 milliGRAM(s) Oral daily  sevelamer carbonate 800 milliGRAM(s) Oral three times a day with meals  traZODone 50 milliGRAM(s) Oral at bedtime        VITALS:  T(F): 98.4 (12-28-23 @ 11:17), Max: 98.6 (12-28-23 @ 00:17)  HR: 104 (12-28-23 @ 11:17)  BP: 172/96 (12-28-23 @ 11:17)  RR: 18 (12-28-23 @ 11:17)  SpO2: 96% (12-28-23 @ 11:17)  Wt(kg): --    12-27 @ 07:01  -  12-28 @ 07:00  --------------------------------------------------------  IN: 300 mL / OUT: 0 mL / NET: 300 mL        PHYSICAL EXAM:  Constitutional: NAD  HEENT: anicteric sclera, oropharynx clear.  Neck: No JVD  Respiratory: CTAB, no wheezes, rales or rhonchi  Cardiovascular: S1, S2, RRR  Gastrointestinal: BS+, soft, NT/ND  Extremities:  No peripheral edema  Neurological: A/O x 3, no focal deficits.  Vascular Access: IJ permacath     LABS:  12-28    134<L>  |  99  |  24<H>  ----------------------------<  120<H>  3.8   |  28  |  6.00<H>    Ca    7.9<L>      28 Dec 2023 06:34  Phos  4.7     12-28  Mg     2.4     12-28      Creatinine Trend: 6.00 <--, 9.74 <--                        10.8   6.54  )-----------( 247      ( 28 Dec 2023 06:34 )             34.5     Urine Studies:  Urinalysis Basic - ( 28 Dec 2023 06:34 )    Color:  / Appearance:  / SG:  / pH:   Gluc: 120 mg/dL / Ketone:   / Bili:  / Urobili:    Blood:  / Protein:  / Nitrite:    Leuk Esterase:  / RBC:  / WBC    Sq Epi:  / Non Sq Epi:  / Bacteria:         RADIOLOGY & ADDITIONAL STUDIES:   [facility] : Progress Note :: [phone], (office [phone]),   Dr Estrada / Dr Das / Dr Gale / Dr Vega / Dr Debbie FORD / Dr Garcia / Dr Chandra / Dr Jona pal  _____________________________________________________________________________________________   hypertensive  HD was arranged today but PT declined     No Known Allergies    Hospital Medications:   MEDICATIONS  (STANDING):  atorvastatin 80 milliGRAM(s) Oral at bedtime  chlorhexidine 2% Cloths 1 Application(s) Topical <User Schedule>  epoetin suzette (PROCRIT) Injectable 4000 Unit(s) IV Push <User Schedule>  ferrous    sulfate 325 milliGRAM(s) Oral daily  gabapentin 300 milliGRAM(s) Oral two times a day  heparin   Injectable 5000 Unit(s) SubCutaneous every 8 hours  hydrALAZINE 50 milliGRAM(s) Oral three times a day  influenza   Vaccine 0.5 milliLiter(s) IntraMuscular once  insulin lispro (ADMELOG) corrective regimen sliding scale   SubCutaneous at bedtime  insulin lispro (ADMELOG) corrective regimen sliding scale   SubCutaneous three times a day before meals  metolazone 5 milliGRAM(s) Oral <User Schedule>  metoprolol tartrate 12.5 milliGRAM(s) Oral two times a day  mupirocin 2% Ointment 1 Application(s) Both Nostrils two times a day  NIFEdipine XL 60 milliGRAM(s) Oral daily  predniSONE   Tablet 10 milliGRAM(s) Oral daily  sevelamer carbonate 800 milliGRAM(s) Oral three times a day with meals  traZODone 50 milliGRAM(s) Oral at bedtime        VITALS:  T(F): 98.4 (12-28-23 @ 11:17), Max: 98.6 (12-28-23 @ 00:17)  HR: 104 (12-28-23 @ 11:17)  BP: 172/96 (12-28-23 @ 11:17)  RR: 18 (12-28-23 @ 11:17)  SpO2: 96% (12-28-23 @ 11:17)  Wt(kg): --    12-27 @ 07:01  -  12-28 @ 07:00  --------------------------------------------------------  IN: 300 mL / OUT: 0 mL / NET: 300 mL        PHYSICAL EXAM:  Constitutional: NAD  HEENT: anicteric sclera, oropharynx clear.  Neck: No JVD  Respiratory: CTAB, no wheezes, rales or rhonchi  Cardiovascular: S1, S2, RRR  Gastrointestinal: BS+, soft, NT/ND  Extremities:  No peripheral edema  Neurological: A/O x 3, no focal deficits.  Vascular Access: IJ permacath     LABS:  12-28    134<L>  |  99  |  24<H>  ----------------------------<  120<H>  3.8   |  28  |  6.00<H>    Ca    7.9<L>      28 Dec 2023 06:34  Phos  4.7     12-28  Mg     2.4     12-28      Creatinine Trend: 6.00 <--, 9.74 <--                        10.8   6.54  )-----------( 247      ( 28 Dec 2023 06:34 )             34.5     Urine Studies:  Urinalysis Basic - ( 28 Dec 2023 06:34 )    Color:  / Appearance:  / SG:  / pH:   Gluc: 120 mg/dL / Ketone:   / Bili:  / Urobili:    Blood:  / Protein:  / Nitrite:    Leuk Esterase:  / RBC:  / WBC    Sq Epi:  / Non Sq Epi:  / Bacteria:         RADIOLOGY & ADDITIONAL STUDIES:

## 2023-12-28 NOTE — DISCHARGE NOTE PROVIDER - NSDCCPCAREPLAN_GEN_ALL_CORE_FT
PRINCIPAL DISCHARGE DIAGNOSIS  Diagnosis: Hypertensive urgency  Assessment and Plan of Treatment: You presented to the ED with shortness of breath after missing a dialysis session.  In the ED patient you had extremely elevated blood pressure (205/111).  You were admitted for hypertensive urgency and missed dialysis session. Nephrology was consulted, Dr. Estrada. You were given medication to lower your blood pressure and underwent dialysis. You were subsequently brought to the inpatient medicine floor however, your blood pressure remained very elevated. You were placed on additional medications and some of your home meds were adjusted. You stabilized on this regimen and were approved for discharge after an additional dialysis session. PLEASE CONTINUE TAKING NIFEDIPINE 60MG ONCE DAILY, HYDRALAZINE 50MG THREE TIMES DAILY, AND METOPROLOL 25MG ONCE A DAY. Please make sure to see your primary care doctor and nephrologist within 1-2 weeks of your of discharge to ensure this issue remains well controlled.      SECONDARY DISCHARGE DIAGNOSES  Diagnosis: ESRD on hemodialysis  Assessment and Plan of Treatment: You have previously been diagnosed with end stage renal disease for which you take medications and do dialysis three times a week. While in the hospital, we continued you on your home medication which adequately managed this condition and resumed you on your regular dialysis schedule. Please make sure to see your primary care doctor within 1-2 weeks of discharge to ensure this issue remains well controlled.    Diagnosis: DM (diabetes mellitus)  Assessment and Plan of Treatment: You have previously been diagnosed with diabetes. While in the hospital, we discontinued your home diabetes medications and managed your blood sugars with insulin. After your discharge, please continue taking your home oral diabetes medications and make sure to see your primary care doctor soon after your discharge to ensure this issue remains well controlled.    Diagnosis: Sarcoidosis  Assessment and Plan of Treatment: You have previously been diagnosed with Sarcoidosis. While in the hospital, we continued you on your home medication which adequately managed this condition. Please make sure to see your primary care doctor within 1-2 weeks of discharge to ensure this issue remains well controlled.     PRINCIPAL DISCHARGE DIAGNOSIS  Diagnosis: Hypertensive urgency  Assessment and Plan of Treatment: You presented to the ED with shortness of breath after missing a dialysis session.  In the ED patient you had extremely elevated blood pressure (205/111).  You were admitted for hypertensive urgency and missed dialysis session. Nephrology was consulted, Dr. Estrada. You were given medication to lower your blood pressure and underwent dialysis. You were subsequently brought to the inpatient medicine floor however, your blood pressure remained very elevated. You were placed on additional medications and some of your home meds were adjusted. You stabilized on this regimen and were approved for discharge after an additional dialysis session. PLEASE CONTINUE TAKING NIFEDIPINE 60MG ONCE DAILY, HYDRALAZINE 50MG THREE TIMES DAILY, AND METOPROLOL 25MG ONCE A DAY. Please make sure to see your primary care doctor and nephrologist within 1-2 weeks of your of discharge to ensure this issue remains well controlled.      SECONDARY DISCHARGE DIAGNOSES  Diagnosis: ESRD on hemodialysis  Assessment and Plan of Treatment: You have previously been diagnosed with end stage renal disease for which you take medications and do dialysis three times a week. While in the hospital, we continued you on your home medication which adequately managed this condition and resumed you on your regular dialysis schedule. Please make sure to see your primary care doctor within 1-2 weeks of discharge to ensure this issue remains well controlled.    Diagnosis: DM (diabetes mellitus)  Assessment and Plan of Treatment: You have previously been diagnosed with diabetes. While in the hospital, we discontinued your home diabetes medications and managed your blood sugars with insulin. After your discharge, please continue taking your home diabetes medications and make sure to see your primary care doctor soon after your discharge to ensure this issue remains well controlled.    Diagnosis: Sarcoidosis  Assessment and Plan of Treatment: You have previously been diagnosed with Sarcoidosis. While in the hospital, we continued you on your home medication which adequately managed this condition. Please make sure to see your primary care doctor within 1-2 weeks of discharge to ensure this issue remains well controlled.    Diagnosis: Neuropathic pain  Assessment and Plan of Treatment: You have previously been diagnosed with neuropathic pain. While in the hospital, we increased your home medication which led to some improvement in your symptoms. PLEASE CONTINUE TAKING GABAPENTINE 300MG TWICE A DAY. Please also make sure to see your primary care doctor within 1-2 weeks of discharge to ensure this issue remains well controlled.

## 2023-12-28 NOTE — PROGRESS NOTE ADULT - PROBLEM SELECTOR PLAN 2
- History of HTN   - On metoprolol, Hydralazine, and amlodipine at home  - now in hypertensive urgency with SBP > 200  - will given labetalol 5mg now (nephro recommending lower dose correction due to HD session coming in AM)  - will continue to monitor and restart home meds Pt w pmh of HTN p/w BP of 205/111  - On metoprolol ER 25mg qd and Hydralazine 50mg bid at home  - s/p Labetolol pushed in ED and again early am on 12/28  - Pressure improved but still elevated  - c/w home metoprolol  - Home hydralazine resumed with tid dosing  - Pt starting of Nifedipine 60mg qd

## 2023-12-28 NOTE — PROGRESS NOTE ADULT - ASSESSMENT
Patient is a 53y Female whom presented to the hospital with shortness of breath and a sense that she is drowning in her lungs. She is relatively new to HD at [facility]. She is on HD MWF, missed HD yesterday . In ED, with hypertensive emergency with pulmonary edema.  Renal consulted for emergent HD became hypotensive at end of HD, hemodialysis stopped 20 minutes before scheduled end of HD. . Seen post HD. complains  of worsening  diabetic neuropathic pain.  She feels  much better with resolution  of SOB .  # ESRD. Admitted with pulmonary edema, high BP sec to missed HD.  s/p HD yesterday  with improvement of BP and SOB.  discussed compliance with HD   Extra HD was planned fro today but declined  # peripheral neuropathy, noted gabapentin dose increased  # HTN. Nifedipine added to the regimen.   Patient is a 53y Female whom presented to the hospital with shortness of breath and a sense that she is drowning in her lungs. She is relatively new to HD at [facility]. She is on HD MWF, missed HD yesterday . In ED, with hypertensive emergency with pulmonary edema.  Renal consulted for emergent HD became hypotensive at end of HD, hemodialysis stopped 20 minutes before scheduled end of HD. . Seen post HD. complains  of worsening  diabetic neuropathic pain.  She feels  much better with resolution  of SOB .  # ESRD. Admitted with pulmonary edema, high BP sec to missed HD.  s/p HD yesterday  with improvement of BP and SOB.  discussed compliance with HD   Extra HD was planned fro today but declined  # peripheral neuropathy, noted gabapentin dose increased  # HTN. Nifedipine added to the regimen.

## 2023-12-28 NOTE — PATIENT PROFILE ADULT - NSPROPTRIGHTNOTIFY_GEN_A_NUR
I left the family a voicemail to call back and reschedule their appointment with Dr. Leary.   
declines

## 2023-12-28 NOTE — DISCHARGE NOTE PROVIDER - NSTOBACCOUSAGEY/N_GEN_A_CS
Initiate Treatment: Sunscreen (SPF 30 or greater) should be applied every 1.5-2 hours, during peak UV exposure (between 10am and 2pm) and reapplied after exercise or swimming. Plan: The ABCDEs of melanoma were reviewed with the patient, and the importance of monthly self-examination of moles was emphasized. Should any moles change in shape or color, or itch, bleed or burn, patient will contact our office for evaluation sooner than their interval appointment.\\nSun protective clothing is also effective at protecting against UV rays that cause skin cancer.\\n Detail Level: Zone Action 3: Continue Increase Regimen: Moisturize body daily No

## 2023-12-28 NOTE — DISCHARGE NOTE PROVIDER - NSDCCAREPROVSEEN_GEN_ALL_CORE_FT
Simeon, Cheikh Sutton, Giselle Hubbard, [facility] Simeon, Cheikh Sutton, Giselle Hubbard, [facility]

## 2023-12-28 NOTE — DISCHARGE NOTE PROVIDER - NSDCMRMEDTOKEN_GEN_ALL_CORE_FT
albuterol 90 mcg/inh inhalation aerosol: 2 puff(s) inhaled every 4 hours, As Needed  epoetin suzette: 4,000 unit(s) injectable 3 times a week Administer with HD  ferrous sulfate 325 mg (65 mg elemental iron) oral delayed release tablet: 1 tab(s) orally once a day  gabapentin 300 mg oral capsule: 1 cap(s) orally once a day  hydrALAZINE 50 mg oral tablet: 1 tab(s) orally 2 times a day  lactobacillus acidophilus oral capsule: 1 tab(s) orally once a day  metOLazone 5 mg oral tablet: 1 tab(s) orally 2 times a day  metoprolol succinate 25 mg oral tablet, extended release: 1 tab(s) orally once a day  predniSONE 10 mg oral tablet: 1 tab(s) orally once a day  Renvela 800 mg oral tablet: 2 tab(s) orally 3 times a day  rosuvastatin 40 mg oral tablet: 1 tab(s) orally once a day  SEVELAMER CARBONATE  800 MG TABS: 2 tab(s) orally 3 times a day  traZODone 50 mg oral tablet: 1 tab(s) orally once a day (at bedtime)  Trulicity Pen 0.75 mg/0.5 mL subcutaneous solution: 0.75 milligram(s) subcutaneously once a week  Vitamin D2 1.25 mg (50,000 intl units) oral capsule: 1 cap(s) orally once a week   albuterol 90 mcg/inh inhalation aerosol: 2 puff(s) inhaled every 4 hours, As Needed  epoetin suzette: 4,000 unit(s) injectable 3 times a week Administer with HD  ferrous sulfate 325 mg (65 mg elemental iron) oral delayed release tablet: 1 tab(s) orally once a day  hydrALAZINE 50 mg oral tablet: 1 tab(s) orally 3 times a day  lactobacillus acidophilus oral capsule: 1 tab(s) orally once a day  metOLazone 5 mg oral tablet: 1 tab(s) orally 2 times a day  metoprolol succinate 25 mg oral tablet, extended release: 1 tab(s) orally once a day  Neurontin 300 mg oral capsule: 1 cap(s) orally 2 times a day  NIFEdipine 60 mg oral tablet, extended release: 1 tab(s) orally once a day  predniSONE 10 mg oral tablet: 1 tab(s) orally once a day  Renvela 800 mg oral tablet: 2 tab(s) orally 3 times a day  rosuvastatin 40 mg oral tablet: 1 tab(s) orally once a day  SEVELAMER CARBONATE  800 MG TABS: 2 tab(s) orally 3 times a day  traZODone 50 mg oral tablet: 1 tab(s) orally once a day (at bedtime)  Trulicity Pen 0.75 mg/0.5 mL subcutaneous solution: 0.75 milligram(s) subcutaneously once a week  Vitamin D2 1.25 mg (50,000 intl units) oral capsule: 1 cap(s) orally once a week

## 2023-12-28 NOTE — PROGRESS NOTE ADULT - PROBLEM SELECTOR PLAN 3
- Patient with history of DM  - Patient was on Lantus 17U qhs and Novolg 10u with meals at home however this was recently held and patient is now on just trulicity at home  - Start patient on Insulin sliding scale ACHS  - Start patient on Finger sticks ACHS  - Resume prior insulin dosing as indicated based on patients BG  - Start Diabetic Diet Patient with history of DM  - Patient was on Lantus 17U qhs and Novolg 10u with meals at home however this was recently held and patient is now on just trulicity at home  - c/w SSI  - Resume prior insulin dosing as indicated based on patients BG  - c/w home med gabapentin 300mg for neuropathic pain, increased to bid dosing   - Start Diabetic Diet

## 2023-12-28 NOTE — CHART NOTE - NSCHARTNOTEFT_GEN_A_CORE
Patient b/p high 187/96, patient asymptomatic but in 8-9/10 pain. IV tylenol given. Reassessed, patient still in pain, /96 given .5 dilaudid. Reassessed again, patient still hypertensive, 181 systolic. Given labetalol 20 IV push Patient b/p high 187/96, patient asymptomatic but in 8-9/10 pain. IV tylenol given. Reassessed, patient still in pain, /96 given .5 dilaudid. Reassessed again, pain subsided, patient still hypertensive, 181 systolic. Given labetalol 10 IV push.

## 2023-12-28 NOTE — DISCHARGE NOTE PROVIDER - NSDCFUSCHEDAPPT_GEN_ALL_CORE_FT
[facility]  INTMED  [facility]   Scheduled Appointment: 01/04/2024     [facility]  INTMED  [facility]   Scheduled Appointment: 01/04/2024

## 2023-12-28 NOTE — PROGRESS NOTE ADULT - ASSESSMENT
53 year old female from home, ambulates independently, with PMH of HTN, DM2, ESRD on HD M/W/F, sarcoidosis, anemia, seizure disorder (no longer on meds) who presented to the ED with shortness of breath.  In the ED patient was Hypertensive with SBP > 200.  Patients lab work showing normal electorlytes and SCr of 9.74.  patient is being admitted for hypertensive urgency and missed HD sessions. 53 year old female from home with PMH of HTN, DM2, ESRD on HD M/W/F, sarcoidosis, anemia, seizure disorder (no longer on meds) presented to the ED with shortness of breath.  In the ED patient was Hypertensive with SBP > 200.  Patients lab work showing normal electorlytes and SCr of 9.74.  patient is being admitted for hypertensive urgency and missed HD sessions. 53 year old female from home with PMH of HTN, DM2, ESRD on HD M/W/F, sarcoidosis, anemia, seizure disorder (no longer on meds) presented to the ED with shortness of breath after missing a dialysis session.  In the ED patient was Hypertensive (205/111).  Patients lab work showing normal electrolytes and SCr of 9.74.  Patient admitted for hypertensive urgency and missed HD sessions.

## 2023-12-28 NOTE — PROGRESS NOTE ADULT - SUBJECTIVE AND OBJECTIVE BOX
PGY-1 Progress Note discussed with attending      PLEASE CONTACT ON CALL TEAM:  - On Call Team (Please refer to Aline) FROM 5:00 PM - 8:30PM  - Nightfloat Team FROM 8:30 -7:30 AM    INTERVAL HPI/OVERNIGHT EVENTS:   Overnight pt was again very hypertensive, 187/96, IV labetolol given.  Patient examined at bedside this AM.  Patient denies acute complaints     REVIEW OF SYSTEMS:  CONSTITUTIONAL: No fever, weight loss, or fatigue  RESPIRATORY: No cough, wheezing,  or hemoptysis; No shortness of breath  CARDIOVASCULAR: No chest pain, palpitations, dizziness, or leg swelling  GASTROINTESTINAL: No abdominal pain. No nausea, vomiting, or hematemesis; No diarrhea or constipation. No melena or hematochezia.  GENITOURINARY: No dysuria, hematuria, or urinary frequency  NEUROLOGICAL: No headaches, memory loss. No focal weakness, numbness, or tremors  MSK: Left leg pain  SKIN: No itching, burning, or rashes    MEDICATIONS  (STANDING):  atorvastatin 80 milliGRAM(s) Oral at bedtime  chlorhexidine 2% Cloths 1 Application(s) Topical <User Schedule>  epoetin suzette (PROCRIT) Injectable 4000 Unit(s) IV Push <User Schedule>  ferrous    sulfate 325 milliGRAM(s) Oral daily  gabapentin 300 milliGRAM(s) Oral two times a day  heparin   Injectable 5000 Unit(s) SubCutaneous every 8 hours  hydrALAZINE 50 milliGRAM(s) Oral three times a day  influenza   Vaccine 0.5 milliLiter(s) IntraMuscular once  insulin lispro (ADMELOG) corrective regimen sliding scale   SubCutaneous three times a day before meals  insulin lispro (ADMELOG) corrective regimen sliding scale   SubCutaneous at bedtime  metolazone 5 milliGRAM(s) Oral <User Schedule>  metoprolol tartrate 12.5 milliGRAM(s) Oral two times a day  mupirocin 2% Ointment 1 Application(s) Both Nostrils two times a day  NIFEdipine XL 60 milliGRAM(s) Oral daily  predniSONE   Tablet 10 milliGRAM(s) Oral daily  sevelamer carbonate 800 milliGRAM(s) Oral three times a day with meals  traZODone 50 milliGRAM(s) Oral at bedtime    MEDICATIONS  (PRN):  acetaminophen     Tablet .. 650 milliGRAM(s) Oral every 6 hours PRN Mild Pain (1 - 3)  albuterol    90 MICROgram(s) HFA Inhaler 2 Puff(s) Inhalation every 6 hours PRN Shortness of Breath and/or Wheezing      Vital Signs Last 24 Hrs  T(C): 36.9 (28 Dec 2023 07:36), Max: 37 (28 Dec 2023 00:17)  T(F): 98.4 (28 Dec 2023 07:36), Max: 98.6 (28 Dec 2023 00:17)  HR: 79 (28 Dec 2023 07:36) (79 - 97)  BP: 146/88 (28 Dec 2023 07:36) (146/88 - 197/99)  BP(mean): 126 (27 Dec 2023 16:50) (126 - 132)  RR: 18 (28 Dec 2023 08:05) (15 - 26)  SpO2: 97% (28 Dec 2023 08:05) (97% - 100%)    Parameters below as of 28 Dec 2023 08:05  Patient On (Oxygen Delivery Method): room air        PHYSICAL EXAMINATION:  GENERAL: well developed, NAD, lying in bed  HEAD:  Atraumatic, Normocephalic  EYES:  Conjunctiva and sclera clear  NECK: Supple. No JVD. Normal thyroid  CHEST/LUNG: Clear to auscultation. No rales or wheezing  HEART: Regular rate and rhythm. No abnormal heart sounds  ABDOMEN: Soft, nontender, and nondistended. Bowel sounds present. No pain or masses on palpation  NERVOUS SYSTEM:  Alert & Oriented X3  EXTREMITIES:  2+ Peripheral Pulses. No appreciable edema  SKIN: warm dry                          10.8   6.54  )-----------( 247      ( 28 Dec 2023 06:34 )             34.5     12-28    134<L>  |  99  |  24<H>  ----------------------------<  120<H>  3.8   |  28  |  6.00<H>    Ca    7.9<L>      28 Dec 2023 06:34  Phos  4.7     12-28  Mg     2.4     12-28                I&O's Summary    27 Dec 2023 07:01  -  28 Dec 2023 07:00  --------------------------------------------------------  IN: 300 mL / OUT: 0 mL / NET: 300 mL            CAPILLARY BLOOD GLUCOSE      RADIOLOGY & ADDITIONAL TESTS:

## 2023-12-28 NOTE — PROGRESS NOTE ADULT - ASSESSMENT
53 yr old female from home , H/O HTN/DM/ESRD on HD/sarcoidosis/anemia, admit hospital for hypertensive urgency/hypoxia/fluid overload/missed HD, S/P renal consult urgent HD , monitor vitals, monitor respiratory status. Advance care discussed , remain full code. Resume BP meds, refused HD today, HD AM then discharge. Improved

## 2023-12-29 ENCOUNTER — TRANSCRIPTION ENCOUNTER (OUTPATIENT)
Age: 53
End: 2023-12-29

## 2023-12-29 VITALS
OXYGEN SATURATION: 100 % | RESPIRATION RATE: 18 BRPM | SYSTOLIC BLOOD PRESSURE: 139 MMHG | TEMPERATURE: 97 F | HEART RATE: 98 BPM | DIASTOLIC BLOOD PRESSURE: 80 MMHG

## 2023-12-29 LAB
ALBUMIN SERPL ELPH-MCNC: 2.5 G/DL — LOW (ref 3.5–5)
ALBUMIN SERPL ELPH-MCNC: 2.5 G/DL — LOW (ref 3.5–5)
ALP SERPL-CCNC: 51 U/L — SIGNIFICANT CHANGE UP (ref 40–120)
ALP SERPL-CCNC: 51 U/L — SIGNIFICANT CHANGE UP (ref 40–120)
ALT FLD-CCNC: 11 U/L DA — SIGNIFICANT CHANGE UP (ref 10–60)
ALT FLD-CCNC: 11 U/L DA — SIGNIFICANT CHANGE UP (ref 10–60)
ANION GAP SERPL CALC-SCNC: 9 MMOL/L — SIGNIFICANT CHANGE UP (ref 5–17)
ANION GAP SERPL CALC-SCNC: 9 MMOL/L — SIGNIFICANT CHANGE UP (ref 5–17)
AST SERPL-CCNC: 15 U/L — SIGNIFICANT CHANGE UP (ref 10–40)
AST SERPL-CCNC: 15 U/L — SIGNIFICANT CHANGE UP (ref 10–40)
BASOPHILS # BLD AUTO: 0.02 K/UL — SIGNIFICANT CHANGE UP (ref 0–0.2)
BASOPHILS # BLD AUTO: 0.02 K/UL — SIGNIFICANT CHANGE UP (ref 0–0.2)
BASOPHILS NFR BLD AUTO: 0.3 % — SIGNIFICANT CHANGE UP (ref 0–2)
BASOPHILS NFR BLD AUTO: 0.3 % — SIGNIFICANT CHANGE UP (ref 0–2)
BILIRUB SERPL-MCNC: 0.2 MG/DL — SIGNIFICANT CHANGE UP (ref 0.2–1.2)
BILIRUB SERPL-MCNC: 0.2 MG/DL — SIGNIFICANT CHANGE UP (ref 0.2–1.2)
BUN SERPL-MCNC: 34 MG/DL — HIGH (ref 7–18)
BUN SERPL-MCNC: 34 MG/DL — HIGH (ref 7–18)
CALCIUM SERPL-MCNC: 8 MG/DL — LOW (ref 8.4–10.5)
CALCIUM SERPL-MCNC: 8 MG/DL — LOW (ref 8.4–10.5)
CHLORIDE SERPL-SCNC: 99 MMOL/L — SIGNIFICANT CHANGE UP (ref 96–108)
CHLORIDE SERPL-SCNC: 99 MMOL/L — SIGNIFICANT CHANGE UP (ref 96–108)
CO2 SERPL-SCNC: 28 MMOL/L — SIGNIFICANT CHANGE UP (ref 22–31)
CO2 SERPL-SCNC: 28 MMOL/L — SIGNIFICANT CHANGE UP (ref 22–31)
CREAT SERPL-MCNC: 7.9 MG/DL — HIGH (ref 0.5–1.3)
CREAT SERPL-MCNC: 7.9 MG/DL — HIGH (ref 0.5–1.3)
EGFR: 6 ML/MIN/1.73M2 — LOW
EGFR: 6 ML/MIN/1.73M2 — LOW
EOSINOPHIL # BLD AUTO: 0.41 K/UL — SIGNIFICANT CHANGE UP (ref 0–0.5)
EOSINOPHIL # BLD AUTO: 0.41 K/UL — SIGNIFICANT CHANGE UP (ref 0–0.5)
EOSINOPHIL NFR BLD AUTO: 6.2 % — HIGH (ref 0–6)
EOSINOPHIL NFR BLD AUTO: 6.2 % — HIGH (ref 0–6)
GLUCOSE BLDC GLUCOMTR-MCNC: 160 MG/DL — HIGH (ref 70–99)
GLUCOSE BLDC GLUCOMTR-MCNC: 160 MG/DL — HIGH (ref 70–99)
GLUCOSE BLDC GLUCOMTR-MCNC: 162 MG/DL — HIGH (ref 70–99)
GLUCOSE BLDC GLUCOMTR-MCNC: 162 MG/DL — HIGH (ref 70–99)
GLUCOSE SERPL-MCNC: 222 MG/DL — HIGH (ref 70–99)
GLUCOSE SERPL-MCNC: 222 MG/DL — HIGH (ref 70–99)
HCT VFR BLD CALC: 31.3 % — LOW (ref 34.5–45)
HCT VFR BLD CALC: 31.3 % — LOW (ref 34.5–45)
HGB BLD-MCNC: 9.7 G/DL — LOW (ref 11.5–15.5)
HGB BLD-MCNC: 9.7 G/DL — LOW (ref 11.5–15.5)
IMM GRANULOCYTES NFR BLD AUTO: 0.5 % — SIGNIFICANT CHANGE UP (ref 0–0.9)
IMM GRANULOCYTES NFR BLD AUTO: 0.5 % — SIGNIFICANT CHANGE UP (ref 0–0.9)
LYMPHOCYTES # BLD AUTO: 1.06 K/UL — SIGNIFICANT CHANGE UP (ref 1–3.3)
LYMPHOCYTES # BLD AUTO: 1.06 K/UL — SIGNIFICANT CHANGE UP (ref 1–3.3)
LYMPHOCYTES # BLD AUTO: 16.1 % — SIGNIFICANT CHANGE UP (ref 13–44)
LYMPHOCYTES # BLD AUTO: 16.1 % — SIGNIFICANT CHANGE UP (ref 13–44)
MAGNESIUM SERPL-MCNC: 2.7 MG/DL — HIGH (ref 1.6–2.6)
MAGNESIUM SERPL-MCNC: 2.7 MG/DL — HIGH (ref 1.6–2.6)
MCHC RBC-ENTMCNC: 28.1 PG — SIGNIFICANT CHANGE UP (ref 27–34)
MCHC RBC-ENTMCNC: 28.1 PG — SIGNIFICANT CHANGE UP (ref 27–34)
MCHC RBC-ENTMCNC: 31 GM/DL — LOW (ref 32–36)
MCHC RBC-ENTMCNC: 31 GM/DL — LOW (ref 32–36)
MCV RBC AUTO: 90.7 FL — SIGNIFICANT CHANGE UP (ref 80–100)
MCV RBC AUTO: 90.7 FL — SIGNIFICANT CHANGE UP (ref 80–100)
MONOCYTES # BLD AUTO: 0.26 K/UL — SIGNIFICANT CHANGE UP (ref 0–0.9)
MONOCYTES # BLD AUTO: 0.26 K/UL — SIGNIFICANT CHANGE UP (ref 0–0.9)
MONOCYTES NFR BLD AUTO: 3.9 % — SIGNIFICANT CHANGE UP (ref 2–14)
MONOCYTES NFR BLD AUTO: 3.9 % — SIGNIFICANT CHANGE UP (ref 2–14)
NEUTROPHILS # BLD AUTO: 4.81 K/UL — SIGNIFICANT CHANGE UP (ref 1.8–7.4)
NEUTROPHILS # BLD AUTO: 4.81 K/UL — SIGNIFICANT CHANGE UP (ref 1.8–7.4)
NEUTROPHILS NFR BLD AUTO: 73 % — SIGNIFICANT CHANGE UP (ref 43–77)
NEUTROPHILS NFR BLD AUTO: 73 % — SIGNIFICANT CHANGE UP (ref 43–77)
NRBC # BLD: 0 /100 WBCS — SIGNIFICANT CHANGE UP (ref 0–0)
NRBC # BLD: 0 /100 WBCS — SIGNIFICANT CHANGE UP (ref 0–0)
PHOSPHATE SERPL-MCNC: 5.8 MG/DL — HIGH (ref 2.5–4.5)
PHOSPHATE SERPL-MCNC: 5.8 MG/DL — HIGH (ref 2.5–4.5)
PLATELET # BLD AUTO: 211 K/UL — SIGNIFICANT CHANGE UP (ref 150–400)
PLATELET # BLD AUTO: 211 K/UL — SIGNIFICANT CHANGE UP (ref 150–400)
POTASSIUM SERPL-MCNC: 4 MMOL/L — SIGNIFICANT CHANGE UP (ref 3.5–5.3)
POTASSIUM SERPL-MCNC: 4 MMOL/L — SIGNIFICANT CHANGE UP (ref 3.5–5.3)
POTASSIUM SERPL-SCNC: 4 MMOL/L — SIGNIFICANT CHANGE UP (ref 3.5–5.3)
POTASSIUM SERPL-SCNC: 4 MMOL/L — SIGNIFICANT CHANGE UP (ref 3.5–5.3)
PROT SERPL-MCNC: 6 G/DL — SIGNIFICANT CHANGE UP (ref 6–8.3)
PROT SERPL-MCNC: 6 G/DL — SIGNIFICANT CHANGE UP (ref 6–8.3)
RBC # BLD: 3.45 M/UL — LOW (ref 3.8–5.2)
RBC # BLD: 3.45 M/UL — LOW (ref 3.8–5.2)
RBC # FLD: 16.9 % — HIGH (ref 10.3–14.5)
RBC # FLD: 16.9 % — HIGH (ref 10.3–14.5)
SODIUM SERPL-SCNC: 136 MMOL/L — SIGNIFICANT CHANGE UP (ref 135–145)
SODIUM SERPL-SCNC: 136 MMOL/L — SIGNIFICANT CHANGE UP (ref 135–145)
WBC # BLD: 6.59 K/UL — SIGNIFICANT CHANGE UP (ref 3.8–10.5)
WBC # BLD: 6.59 K/UL — SIGNIFICANT CHANGE UP (ref 3.8–10.5)
WBC # FLD AUTO: 6.59 K/UL — SIGNIFICANT CHANGE UP (ref 3.8–10.5)
WBC # FLD AUTO: 6.59 K/UL — SIGNIFICANT CHANGE UP (ref 3.8–10.5)

## 2023-12-29 PROCEDURE — 87340 HEPATITIS B SURFACE AG IA: CPT

## 2023-12-29 PROCEDURE — 86706 HEP B SURFACE ANTIBODY: CPT

## 2023-12-29 PROCEDURE — 85027 COMPLETE CBC AUTOMATED: CPT

## 2023-12-29 PROCEDURE — 85025 COMPLETE CBC W/AUTO DIFF WBC: CPT

## 2023-12-29 PROCEDURE — 84100 ASSAY OF PHOSPHORUS: CPT

## 2023-12-29 PROCEDURE — 99285 EMERGENCY DEPT VISIT HI MDM: CPT | Mod: 25

## 2023-12-29 PROCEDURE — 80053 COMPREHEN METABOLIC PANEL: CPT

## 2023-12-29 PROCEDURE — 83735 ASSAY OF MAGNESIUM: CPT

## 2023-12-29 PROCEDURE — 82962 GLUCOSE BLOOD TEST: CPT

## 2023-12-29 PROCEDURE — 83036 HEMOGLOBIN GLYCOSYLATED A1C: CPT

## 2023-12-29 PROCEDURE — 87637 SARSCOV2&INF A&B&RSV AMP PRB: CPT

## 2023-12-29 PROCEDURE — 93005 ELECTROCARDIOGRAM TRACING: CPT

## 2023-12-29 PROCEDURE — 83880 ASSAY OF NATRIURETIC PEPTIDE: CPT

## 2023-12-29 PROCEDURE — 99261: CPT

## 2023-12-29 PROCEDURE — 71045 X-RAY EXAM CHEST 1 VIEW: CPT

## 2023-12-29 PROCEDURE — 84484 ASSAY OF TROPONIN QUANT: CPT

## 2023-12-29 PROCEDURE — 80048 BASIC METABOLIC PNL TOTAL CA: CPT

## 2023-12-29 PROCEDURE — 36415 COLL VENOUS BLD VENIPUNCTURE: CPT

## 2023-12-29 RX ORDER — NIFEDIPINE 30 MG
1 TABLET, EXTENDED RELEASE 24 HR ORAL
Qty: 30 | Refills: 0
Start: 2023-12-29 | End: 2024-01-27

## 2023-12-29 RX ORDER — HYDRALAZINE HCL 50 MG
1 TABLET ORAL
Refills: 0 | DISCHARGE

## 2023-12-29 RX ORDER — HYDROMORPHONE HYDROCHLORIDE 2 MG/ML
0.2 INJECTION INTRAMUSCULAR; INTRAVENOUS; SUBCUTANEOUS ONCE
Refills: 0 | Status: DISCONTINUED | OUTPATIENT
Start: 2023-12-29 | End: 2023-12-29

## 2023-12-29 RX ORDER — GABAPENTIN 400 MG/1
1 CAPSULE ORAL
Qty: 60 | Refills: 0
Start: 2023-12-29 | End: 2024-01-27

## 2023-12-29 RX ORDER — HYDRALAZINE HCL 50 MG
1 TABLET ORAL
Qty: 90 | Refills: 0
Start: 2023-12-29 | End: 2024-01-27

## 2023-12-29 RX ADMIN — Medication 12.5 MILLIGRAM(S): at 05:56

## 2023-12-29 RX ADMIN — Medication 1: at 08:10

## 2023-12-29 RX ADMIN — Medication 650 MILLIGRAM(S): at 12:12

## 2023-12-29 RX ADMIN — Medication 10 MILLIGRAM(S): at 05:55

## 2023-12-29 RX ADMIN — Medication 60 MILLIGRAM(S): at 05:56

## 2023-12-29 RX ADMIN — Medication 650 MILLIGRAM(S): at 13:18

## 2023-12-29 RX ADMIN — Medication 50 MILLIGRAM(S): at 07:02

## 2023-12-29 RX ADMIN — SEVELAMER CARBONATE 800 MILLIGRAM(S): 2400 POWDER, FOR SUSPENSION ORAL at 08:11

## 2023-12-29 RX ADMIN — MUPIROCIN 1 APPLICATION(S): 20 OINTMENT TOPICAL at 05:57

## 2023-12-29 RX ADMIN — HYDROMORPHONE HYDROCHLORIDE 0.2 MILLIGRAM(S): 2 INJECTION INTRAMUSCULAR; INTRAVENOUS; SUBCUTANEOUS at 13:16

## 2023-12-29 RX ADMIN — HEPARIN SODIUM 5000 UNIT(S): 5000 INJECTION INTRAVENOUS; SUBCUTANEOUS at 05:55

## 2023-12-29 RX ADMIN — Medication 325 MILLIGRAM(S): at 13:17

## 2023-12-29 RX ADMIN — CHLORHEXIDINE GLUCONATE 1 APPLICATION(S): 213 SOLUTION TOPICAL at 05:54

## 2023-12-29 RX ADMIN — GABAPENTIN 300 MILLIGRAM(S): 400 CAPSULE ORAL at 13:16

## 2023-12-29 RX ADMIN — ERYTHROPOIETIN 4000 UNIT(S): 10000 INJECTION, SOLUTION INTRAVENOUS; SUBCUTANEOUS at 11:25

## 2023-12-29 RX ADMIN — Medication 50 MILLIGRAM(S): at 13:16

## 2023-12-29 RX ADMIN — SEVELAMER CARBONATE 800 MILLIGRAM(S): 2400 POWDER, FOR SUSPENSION ORAL at 13:17

## 2023-12-29 RX ADMIN — HYDROMORPHONE HYDROCHLORIDE 0.2 MILLIGRAM(S): 2 INJECTION INTRAMUSCULAR; INTRAVENOUS; SUBCUTANEOUS at 13:35

## 2023-12-29 NOTE — DISCHARGE NOTE NURSING/CASE MANAGEMENT/SOCIAL WORK - PATIENT PORTAL LINK FT
You can access the FollowMyHealth Patient Portal offered by [facility] by registering at the following website: [email or web address]. By joining [employer]’s FollowMyHealth portal, you will also be able to view your health information using other applications (apps) compatible with our system. You can access the FollowMyHealth Patient Portal offered by [facility] by registering at the following website: [email or web address]. By joining [employer]’s FollowMyHealth portal, you will also be able to view your health information using other applications (apps) compatible with our system.

## 2023-12-29 NOTE — PROGRESS NOTE ADULT - REASON FOR ADMISSION
Hypertensive urgency 2/2 Missed HD sessions
Hypertensive urgency 2/2 Missed HD sessions
Hypertensive urgency 2/2 Missed HD sessions/hypoxia
Hypertensive urgency 2/2 Missed HD sessions/hypoxia/fluid overload
Hypertensive urgency 2/2 Missed HD sessions

## 2023-12-29 NOTE — DISCHARGE NOTE NURSING/CASE MANAGEMENT/SOCIAL WORK - NSDCPEFALRISK_GEN_ALL_CORE
For information on Fall & Injury Prevention, visit: [email or web address] OR  [email or web address] OR  https://www.cdc.gov/steadi/patient.html For information on Fall & Injury Prevention, visit: [email or web address] OR  [email or web address] OR  https://www.cdc.gov/steadi/patient.html

## 2023-12-29 NOTE — DISCHARGE NOTE NURSING/CASE MANAGEMENT/SOCIAL WORK - CAREGIVER ADDRESS
[id number] [place] [id number] axcmsy [id number] [place] [id number] [email or web address]

## 2023-12-29 NOTE — PROGRESS NOTE ADULT - SUBJECTIVE AND OBJECTIVE BOX
Patient is a 53y old  Female who presents with a chief complaint of Hypertensive urgency 2/2 Missed HD sessions (28 Dec 2023 15:00)/hypoxia/fluid overload      INTERVAL HPI/OVERNIGHT EVENTS:  T(C): 36.8 (12-29-23 @ 08:10), Max: 37.3 (12-28-23 @ 23:43)  HR: 89 (12-29-23 @ 09:23) (81 - 110)  BP: 128/79 (12-29-23 @ 09:23) (118/79 - 172/96)  RR: 18 (12-29-23 @ 09:23) (17 - 18)  SpO2: 98% (12-29-23 @ 09:23) (96% - 100%)  Wt(kg): --    LABS:                        9.7    6.59  )-----------( 211      ( 29 Dec 2023 09:30 )             31.3     12-29    136  |  99  |  34<H>  ----------------------------<  222<H>  4.0   |  28  |  7.90<H>    Ca    8.0<L>      29 Dec 2023 09:30  Phos  5.8     12-29  Mg     2.7     12-29    TPro  6.0  /  Alb  2.5<L>  /  TBili  0.2  /  DBili  x   /  AST  15  /  ALT  11  /  AlkPhos  51  12-29      Urinalysis Basic - ( 29 Dec 2023 09:30 )    Color: x / Appearance: x / SG: x / pH: x  Gluc: 222 mg/dL / Ketone: x  / Bili: x / Urobili: x   Blood: x / Protein: x / Nitrite: x   Leuk Esterase: x / RBC: x / WBC x   Sq Epi: x / Non Sq Epi: x / Bacteria: x      CAPILLARY BLOOD GLUCOSE      POCT Blood Glucose.: 160 mg/dL (29 Dec 2023 07:57)  POCT Blood Glucose.: 171 mg/dL (28 Dec 2023 21:44)  POCT Blood Glucose.: 150 mg/dL (28 Dec 2023 17:29)  POCT Blood Glucose.: 275 mg/dL (28 Dec 2023 12:57)  POCT Blood Glucose.: 225 mg/dL (28 Dec 2023 11:36)        RADIOLOGY & ADDITIONAL TESTS:    Consultant(s) Notes Reviewed:  [x ] YES  [ ] NO    PHYSICAL EXAM:  GENERAL: well built, well nourished  HEAD:  Atraumatic, Normocephalic  EYES: EOMI, PERRLA, conjunctiva and sclera clear  ENT: No tonsillar erythema, exudates, or enlargement; Moist mucous membranes, Good dentition, No lesions  NECK: Supple, No JVD, Normal thyroid, no enlarged nodes  NERVOUS SYSTEM:  Alert & Oriented X3, Good concentration; Motor Strength 5/5 B/L upper and lower extremities; DTRs 2+ intact and symmetric, sensory intact  CHEST/LUNG: B/L good air entry; No rales, rhonchi, or wheezing  HEART: S1S2 normal, no S3, Regular rate and rhythm; No murmurs, rubs, or gallops  ABDOMEN: Soft, Nontender, Nondistended; Bowel sounds present  EXTREMITIES:  2+ Peripheral Pulses, No clubbing, cyanosis, or edema  LYMPH: No lymphadenopathy noted  SKIN: No rashes or lesions    Care Discussed with Consultants/Other Providers [ x] YES  [ ] NO

## 2023-12-29 NOTE — PROGRESS NOTE ADULT - ASSESSMENT
53 yr old female from home , H/O HTN/DM/ESRD on HD/sarcoidosis/anemia, admit hospital for hypertensive urgency/hypoxia/fluid overload/missed HD, S/P renal consult urgent HD , monitor vitals, monitor respiratory status. Advance care discussed , remain full code. Resume BP meds, BP controlled ,  HD today,   discharge after HD, F/U renal HD outpatient.

## 2024-01-04 ENCOUNTER — APPOINTMENT (OUTPATIENT)
Dept: INTERNAL MEDICINE | Facility: CLINIC | Age: 54
End: 2024-01-04
Payer: MEDICARE

## 2024-01-04 ENCOUNTER — TRANSCRIPTION ENCOUNTER (OUTPATIENT)
Age: 54
End: 2024-01-04

## 2024-01-04 VITALS
HEART RATE: 92 BPM | SYSTOLIC BLOOD PRESSURE: 120 MMHG | BODY MASS INDEX: 21.2 KG/M2 | DIASTOLIC BLOOD PRESSURE: 70 MMHG | OXYGEN SATURATION: 98 % | WEIGHT: 108 LBS | HEIGHT: 60 IN

## 2024-01-04 DIAGNOSIS — R20.0 ANESTHESIA OF SKIN: ICD-10-CM

## 2024-01-04 DIAGNOSIS — M54.50 LOW BACK PAIN, UNSPECIFIED: ICD-10-CM

## 2024-01-04 DIAGNOSIS — M79.605 PAIN IN LEFT LEG: ICD-10-CM

## 2024-01-04 PROCEDURE — 99213 OFFICE O/P EST LOW 20 MIN: CPT | Mod: GE

## 2024-01-05 ENCOUNTER — INPATIENT (INPATIENT)
Facility: HOSPITAL | Age: 54
LOS: 1 days | Discharge: HOME | DRG: 640 | End: 2024-01-07
Attending: INTERNAL MEDICINE | Admitting: INTERNAL MEDICINE
Payer: MEDICARE

## 2024-01-05 VITALS
HEART RATE: 104 BPM | WEIGHT: 102.07 LBS | DIASTOLIC BLOOD PRESSURE: 85 MMHG | TEMPERATURE: 100 F | HEIGHT: 61.02 IN | SYSTOLIC BLOOD PRESSURE: 157 MMHG | RESPIRATION RATE: 18 BRPM | OXYGEN SATURATION: 96 %

## 2024-01-05 DIAGNOSIS — E11.9 TYPE 2 DIABETES MELLITUS WITHOUT COMPLICATIONS: ICD-10-CM

## 2024-01-05 DIAGNOSIS — N18.3 CHRONIC KIDNEY DISEASE, STAGE 3 (MODERATE): ICD-10-CM

## 2024-01-05 DIAGNOSIS — D64.9 ANEMIA, UNSPECIFIED: ICD-10-CM

## 2024-01-05 DIAGNOSIS — I10 ESSENTIAL (PRIMARY) HYPERTENSION: ICD-10-CM

## 2024-01-05 DIAGNOSIS — E78.5 HYPERLIPIDEMIA, UNSPECIFIED: ICD-10-CM

## 2024-01-05 DIAGNOSIS — R05.9 COUGH, UNSPECIFIED: ICD-10-CM

## 2024-01-05 DIAGNOSIS — G40.909 EPILEPSY, UNSPECIFIED, NOT INTRACTABLE, WITHOUT STATUS EPILEPTICUS: ICD-10-CM

## 2024-01-05 DIAGNOSIS — E87.5 HYPERKALEMIA: ICD-10-CM

## 2024-01-05 DIAGNOSIS — Z29.9 ENCOUNTER FOR PROPHYLACTIC MEASURES, UNSPECIFIED: ICD-10-CM

## 2024-01-05 DIAGNOSIS — N18.6 END STAGE RENAL DISEASE: ICD-10-CM

## 2024-01-05 DIAGNOSIS — D86.9 SARCOIDOSIS, UNSPECIFIED: ICD-10-CM

## 2024-01-05 LAB
ALBUMIN SERPL ELPH-MCNC: 2.8 G/DL — LOW (ref 3.5–5)
ALBUMIN SERPL ELPH-MCNC: 2.8 G/DL — LOW (ref 3.5–5)
ALP SERPL-CCNC: 55 U/L — SIGNIFICANT CHANGE UP (ref 40–120)
ALP SERPL-CCNC: 55 U/L — SIGNIFICANT CHANGE UP (ref 40–120)
ALT FLD-CCNC: 14 U/L DA — SIGNIFICANT CHANGE UP (ref 10–60)
ALT FLD-CCNC: 14 U/L DA — SIGNIFICANT CHANGE UP (ref 10–60)
ANION GAP SERPL CALC-SCNC: 9 MMOL/L — SIGNIFICANT CHANGE UP (ref 5–17)
ANION GAP SERPL CALC-SCNC: 9 MMOL/L — SIGNIFICANT CHANGE UP (ref 5–17)
AST SERPL-CCNC: 24 U/L — SIGNIFICANT CHANGE UP (ref 10–40)
AST SERPL-CCNC: 24 U/L — SIGNIFICANT CHANGE UP (ref 10–40)
BASOPHILS # BLD AUTO: 0.03 K/UL — SIGNIFICANT CHANGE UP (ref 0–0.2)
BASOPHILS # BLD AUTO: 0.03 K/UL — SIGNIFICANT CHANGE UP (ref 0–0.2)
BASOPHILS NFR BLD AUTO: 0.4 % — SIGNIFICANT CHANGE UP (ref 0–2)
BASOPHILS NFR BLD AUTO: 0.4 % — SIGNIFICANT CHANGE UP (ref 0–2)
BILIRUB SERPL-MCNC: 0.4 MG/DL — SIGNIFICANT CHANGE UP (ref 0.2–1.2)
BILIRUB SERPL-MCNC: 0.4 MG/DL — SIGNIFICANT CHANGE UP (ref 0.2–1.2)
BUN SERPL-MCNC: 60 MG/DL — HIGH (ref 7–18)
BUN SERPL-MCNC: 60 MG/DL — HIGH (ref 7–18)
CALCIUM SERPL-MCNC: 8.8 MG/DL — SIGNIFICANT CHANGE UP (ref 8.4–10.5)
CALCIUM SERPL-MCNC: 8.8 MG/DL — SIGNIFICANT CHANGE UP (ref 8.4–10.5)
CHLORIDE SERPL-SCNC: 103 MMOL/L — SIGNIFICANT CHANGE UP (ref 96–108)
CHLORIDE SERPL-SCNC: 103 MMOL/L — SIGNIFICANT CHANGE UP (ref 96–108)
CO2 SERPL-SCNC: 22 MMOL/L — SIGNIFICANT CHANGE UP (ref 22–31)
CO2 SERPL-SCNC: 22 MMOL/L — SIGNIFICANT CHANGE UP (ref 22–31)
CREAT SERPL-MCNC: 9.85 MG/DL — HIGH (ref 0.5–1.3)
CREAT SERPL-MCNC: 9.85 MG/DL — HIGH (ref 0.5–1.3)
EGFR: 4 ML/MIN/1.73M2 — LOW
EGFR: 4 ML/MIN/1.73M2 — LOW
EOSINOPHIL # BLD AUTO: 0.33 K/UL — SIGNIFICANT CHANGE UP (ref 0–0.5)
EOSINOPHIL # BLD AUTO: 0.33 K/UL — SIGNIFICANT CHANGE UP (ref 0–0.5)
EOSINOPHIL NFR BLD AUTO: 4.3 % — SIGNIFICANT CHANGE UP (ref 0–6)
EOSINOPHIL NFR BLD AUTO: 4.3 % — SIGNIFICANT CHANGE UP (ref 0–6)
FLUAV AG NPH QL: SIGNIFICANT CHANGE UP
FLUAV AG NPH QL: SIGNIFICANT CHANGE UP
FLUBV AG NPH QL: SIGNIFICANT CHANGE UP
FLUBV AG NPH QL: SIGNIFICANT CHANGE UP
GLUCOSE SERPL-MCNC: 172 MG/DL — HIGH (ref 70–99)
GLUCOSE SERPL-MCNC: 172 MG/DL — HIGH (ref 70–99)
HCT VFR BLD CALC: 36.7 % — SIGNIFICANT CHANGE UP (ref 34.5–45)
HCT VFR BLD CALC: 36.7 % — SIGNIFICANT CHANGE UP (ref 34.5–45)
HGB BLD-MCNC: 11.3 G/DL — LOW (ref 11.5–15.5)
HGB BLD-MCNC: 11.3 G/DL — LOW (ref 11.5–15.5)
IMM GRANULOCYTES NFR BLD AUTO: 0.5 % — SIGNIFICANT CHANGE UP (ref 0–0.9)
IMM GRANULOCYTES NFR BLD AUTO: 0.5 % — SIGNIFICANT CHANGE UP (ref 0–0.9)
LACTATE SERPL-SCNC: 1 MMOL/L — SIGNIFICANT CHANGE UP (ref 0.7–2)
LACTATE SERPL-SCNC: 1 MMOL/L — SIGNIFICANT CHANGE UP (ref 0.7–2)
LYMPHOCYTES # BLD AUTO: 1.03 K/UL — SIGNIFICANT CHANGE UP (ref 1–3.3)
LYMPHOCYTES # BLD AUTO: 1.03 K/UL — SIGNIFICANT CHANGE UP (ref 1–3.3)
LYMPHOCYTES # BLD AUTO: 13.4 % — SIGNIFICANT CHANGE UP (ref 13–44)
LYMPHOCYTES # BLD AUTO: 13.4 % — SIGNIFICANT CHANGE UP (ref 13–44)
MCHC RBC-ENTMCNC: 28.3 PG — SIGNIFICANT CHANGE UP (ref 27–34)
MCHC RBC-ENTMCNC: 28.3 PG — SIGNIFICANT CHANGE UP (ref 27–34)
MCHC RBC-ENTMCNC: 30.8 GM/DL — LOW (ref 32–36)
MCHC RBC-ENTMCNC: 30.8 GM/DL — LOW (ref 32–36)
MCV RBC AUTO: 91.8 FL — SIGNIFICANT CHANGE UP (ref 80–100)
MCV RBC AUTO: 91.8 FL — SIGNIFICANT CHANGE UP (ref 80–100)
MONOCYTES # BLD AUTO: 0.65 K/UL — SIGNIFICANT CHANGE UP (ref 0–0.9)
MONOCYTES # BLD AUTO: 0.65 K/UL — SIGNIFICANT CHANGE UP (ref 0–0.9)
MONOCYTES NFR BLD AUTO: 8.5 % — SIGNIFICANT CHANGE UP (ref 2–14)
MONOCYTES NFR BLD AUTO: 8.5 % — SIGNIFICANT CHANGE UP (ref 2–14)
NEUTROPHILS # BLD AUTO: 5.6 K/UL — SIGNIFICANT CHANGE UP (ref 1.8–7.4)
NEUTROPHILS # BLD AUTO: 5.6 K/UL — SIGNIFICANT CHANGE UP (ref 1.8–7.4)
NEUTROPHILS NFR BLD AUTO: 72.9 % — SIGNIFICANT CHANGE UP (ref 43–77)
NEUTROPHILS NFR BLD AUTO: 72.9 % — SIGNIFICANT CHANGE UP (ref 43–77)
NRBC # BLD: 0 /100 WBCS — SIGNIFICANT CHANGE UP (ref 0–0)
NRBC # BLD: 0 /100 WBCS — SIGNIFICANT CHANGE UP (ref 0–0)
PLATELET # BLD AUTO: 263 K/UL — SIGNIFICANT CHANGE UP (ref 150–400)
PLATELET # BLD AUTO: 263 K/UL — SIGNIFICANT CHANGE UP (ref 150–400)
POTASSIUM SERPL-MCNC: 6.2 MMOL/L — CRITICAL HIGH (ref 3.5–5.3)
POTASSIUM SERPL-MCNC: 6.2 MMOL/L — CRITICAL HIGH (ref 3.5–5.3)
POTASSIUM SERPL-SCNC: 6.2 MMOL/L — CRITICAL HIGH (ref 3.5–5.3)
POTASSIUM SERPL-SCNC: 6.2 MMOL/L — CRITICAL HIGH (ref 3.5–5.3)
PROT SERPL-MCNC: 7 G/DL — SIGNIFICANT CHANGE UP (ref 6–8.3)
PROT SERPL-MCNC: 7 G/DL — SIGNIFICANT CHANGE UP (ref 6–8.3)
RAPID RVP RESULT: DETECTED
RAPID RVP RESULT: DETECTED
RBC # BLD: 4 M/UL — SIGNIFICANT CHANGE UP (ref 3.8–5.2)
RBC # BLD: 4 M/UL — SIGNIFICANT CHANGE UP (ref 3.8–5.2)
RBC # FLD: 16.7 % — HIGH (ref 10.3–14.5)
RBC # FLD: 16.7 % — HIGH (ref 10.3–14.5)
RV+EV RNA SPEC QL NAA+PROBE: DETECTED
RV+EV RNA SPEC QL NAA+PROBE: DETECTED
SARS-COV-2 RNA SPEC QL NAA+PROBE: SIGNIFICANT CHANGE UP
SODIUM SERPL-SCNC: 134 MMOL/L — LOW (ref 135–145)
SODIUM SERPL-SCNC: 134 MMOL/L — LOW (ref 135–145)
WBC # BLD: 7.68 K/UL — SIGNIFICANT CHANGE UP (ref 3.8–10.5)
WBC # BLD: 7.68 K/UL — SIGNIFICANT CHANGE UP (ref 3.8–10.5)
WBC # FLD AUTO: 7.68 K/UL — SIGNIFICANT CHANGE UP (ref 3.8–10.5)
WBC # FLD AUTO: 7.68 K/UL — SIGNIFICANT CHANGE UP (ref 3.8–10.5)

## 2024-01-05 PROCEDURE — 93010 ELECTROCARDIOGRAM REPORT: CPT

## 2024-01-05 PROCEDURE — 99285 EMERGENCY DEPT VISIT HI MDM: CPT

## 2024-01-05 PROCEDURE — 71045 X-RAY EXAM CHEST 1 VIEW: CPT | Mod: 26

## 2024-01-05 RX ORDER — HYDRALAZINE HCL 50 MG
50 TABLET ORAL THREE TIMES A DAY
Refills: 0 | Status: DISCONTINUED | OUTPATIENT
Start: 2024-01-05 | End: 2024-01-07

## 2024-01-05 RX ORDER — SEVELAMER CARBONATE 2400 MG/1
1600 POWDER, FOR SUSPENSION ORAL
Refills: 0 | Status: DISCONTINUED | OUTPATIENT
Start: 2024-01-05 | End: 2024-01-07

## 2024-01-05 RX ORDER — FERROUS SULFATE 325(65) MG
325 TABLET ORAL DAILY
Refills: 0 | Status: DISCONTINUED | OUTPATIENT
Start: 2024-01-05 | End: 2024-01-07

## 2024-01-05 RX ORDER — SODIUM ZIRCONIUM CYCLOSILICATE 10 G/10G
10 POWDER, FOR SUSPENSION ORAL ONCE
Refills: 0 | Status: COMPLETED | OUTPATIENT
Start: 2024-01-05 | End: 2024-01-05

## 2024-01-05 RX ORDER — ALBUTEROL 90 UG/1
2 AEROSOL, METERED ORAL EVERY 4 HOURS
Refills: 0 | Status: DISCONTINUED | OUTPATIENT
Start: 2024-01-05 | End: 2024-01-07

## 2024-01-05 RX ORDER — SEVELAMER CARBONATE 2400 MG/1
2 POWDER, FOR SUSPENSION ORAL
Refills: 0 | DISCHARGE

## 2024-01-05 RX ORDER — INSULIN LISPRO 100/ML
VIAL (ML) SUBCUTANEOUS
Refills: 0 | Status: DISCONTINUED | OUTPATIENT
Start: 2024-01-05 | End: 2024-01-07

## 2024-01-05 RX ORDER — NIFEDIPINE 30 MG
60 TABLET, EXTENDED RELEASE 24 HR ORAL DAILY
Refills: 0 | Status: DISCONTINUED | OUTPATIENT
Start: 2024-01-05 | End: 2024-01-07

## 2024-01-05 RX ORDER — ATORVASTATIN CALCIUM 80 MG/1
80 TABLET, FILM COATED ORAL AT BEDTIME
Refills: 0 | Status: DISCONTINUED | OUTPATIENT
Start: 2024-01-05 | End: 2024-01-07

## 2024-01-05 RX ORDER — LACTOBACILLUS ACIDOPHILUS 100MM CELL
1 CAPSULE ORAL DAILY
Refills: 0 | Status: DISCONTINUED | OUTPATIENT
Start: 2024-01-05 | End: 2024-01-07

## 2024-01-05 RX ORDER — ERYTHROPOIETIN 10000 [IU]/ML
4000 INJECTION, SOLUTION INTRAVENOUS; SUBCUTANEOUS
Refills: 0 | Status: DISCONTINUED | OUTPATIENT
Start: 2024-01-05 | End: 2024-01-05

## 2024-01-05 RX ORDER — ERGOCALCIFEROL 1.25 MG/1
50000 CAPSULE ORAL
Refills: 0 | Status: DISCONTINUED | OUTPATIENT
Start: 2024-01-05 | End: 2024-01-07

## 2024-01-05 RX ORDER — HEPARIN SODIUM 5000 [USP'U]/ML
5000 INJECTION INTRAVENOUS; SUBCUTANEOUS EVERY 8 HOURS
Refills: 0 | Status: DISCONTINUED | OUTPATIENT
Start: 2024-01-05 | End: 2024-01-07

## 2024-01-05 RX ORDER — CALCIUM GLUCONATE 100 MG/ML
2 VIAL (ML) INTRAVENOUS ONCE
Refills: 0 | Status: COMPLETED | OUTPATIENT
Start: 2024-01-05 | End: 2024-01-05

## 2024-01-05 RX ORDER — TRAZODONE HCL 50 MG
50 TABLET ORAL AT BEDTIME
Refills: 0 | Status: DISCONTINUED | OUTPATIENT
Start: 2024-01-05 | End: 2024-01-07

## 2024-01-05 RX ORDER — GABAPENTIN 400 MG/1
300 CAPSULE ORAL
Refills: 0 | Status: DISCONTINUED | OUTPATIENT
Start: 2024-01-05 | End: 2024-01-07

## 2024-01-05 RX ORDER — INSULIN GLARGINE 100 [IU]/ML
10 INJECTION, SOLUTION SUBCUTANEOUS AT BEDTIME
Refills: 0 | Status: DISCONTINUED | OUTPATIENT
Start: 2024-01-05 | End: 2024-01-06

## 2024-01-05 RX ORDER — INSULIN LISPRO 100/ML
5 VIAL (ML) SUBCUTANEOUS
Refills: 0 | Status: DISCONTINUED | OUTPATIENT
Start: 2024-01-05 | End: 2024-01-07

## 2024-01-05 RX ORDER — ACETAMINOPHEN 500 MG
650 TABLET ORAL ONCE
Refills: 0 | Status: COMPLETED | OUTPATIENT
Start: 2024-01-05 | End: 2024-01-05

## 2024-01-05 RX ORDER — METOPROLOL TARTRATE 50 MG
25 TABLET ORAL DAILY
Refills: 0 | Status: DISCONTINUED | OUTPATIENT
Start: 2024-01-05 | End: 2024-01-07

## 2024-01-05 RX ADMIN — Medication 200 GRAM(S): at 15:25

## 2024-01-05 RX ADMIN — Medication 650 MILLIGRAM(S): at 14:07

## 2024-01-05 RX ADMIN — SODIUM ZIRCONIUM CYCLOSILICATE 10 GRAM(S): 10 POWDER, FOR SUSPENSION ORAL at 15:25

## 2024-01-05 RX ADMIN — Medication 50 MILLIGRAM(S): at 22:51

## 2024-01-05 RX ADMIN — Medication 650 MILLIGRAM(S): at 14:37

## 2024-01-05 RX ADMIN — HEPARIN SODIUM 5000 UNIT(S): 5000 INJECTION INTRAVENOUS; SUBCUTANEOUS at 22:51

## 2024-01-05 NOTE — REVIEW OF SYSTEMS
[Fatigue] : fatigue [Muscle Weakness] : muscle weakness [Unsteady Walking] : ataxia [Negative] : Genitourinary [Fever] : no fever [Chills] : no chills [Joint Pain] : no joint pain [Joint Stiffness] : no joint stiffness [Joint Swelling] : no joint swelling [Muscle Pain] : no muscle pain [Back Pain] : no back pain [Itching] : no itching [Skin Rash] : no skin rash [Headache] : no headache [Dizziness] : no dizziness [Fainting] : no fainting [Anxiety] : no anxiety [Depression] : no depression [Easy Bleeding] : no easy bleeding [Easy Bruising] : no easy bruising [de-identified] : +b/l LE allodynia

## 2024-01-05 NOTE — ASSESSMENT
[FreeTextEntry1] : HTN: continue current regimen w/ hydralazine 50mg TID, metoprolol succinate 25mg qd, nifedipine 60mg qd, iHD MWF. BP today at goal.   B/l lower extremity pain: describing allodynia of b/l LEs, worse during HD, improved w/ compression stockings; no effect w/ tylenol or gabapentin, reported good relief w/ IV dilaudid.  Concern for peripheral neuropathy, pain related to LE edema. Diagnosis of exclusion: complex regional pain syndrome - happens most often in distal extremities. Less likely myositis, rhabdo given time course, fibromyalgia as there is no diffuse pain.  - trial of cyclobenzaprine 5mg TID PRN - neurology referral - advised patient to bring this up during HD - possible that she needs more fluid removed to minimize LE edema as much as possible  RTC 2 weeks for televist, in-person visit in 5 weeks - to assess lower extremity pain, BP control.   Case d/w Dr. Mitchell.

## 2024-01-05 NOTE — INTERPRETER SERVICES
[Patient Declined  Services] : - None: Patient declined  services
+L thumb: normal ROM. no deformities. no marva tenderness.

## 2024-01-05 NOTE — ED PROVIDER NOTE - CLINICAL SUMMARY MEDICAL DECISION MAKING FREE TEXT BOX
54-year-old female sent in for fever and what sounds like viral illness versus pneumonia  Unable to get dialysis today, but no increased work of breathing, appears euvolemic on exam  Plan: Swab, chest x-ray, labs, supportive care, admit versus discharge

## 2024-01-05 NOTE — PHYSICAL EXAM
[No Acute Distress] : no acute distress [Well-Appearing] : well-appearing [Normal] : normal rate, regular rhythm, normal S1 and S2 and no murmur heard [Soft] : abdomen soft [Non Tender] : non-tender [Non-distended] : non-distended [No Spinal Tenderness] : no spinal tenderness [No Joint Swelling] : no joint swelling [No Rash] : no rash [No Focal Deficits] : no focal deficits [Normal Affect] : the affect was normal [de-identified] : LLE edema b/l and symmetric 1-2+ pitting [de-identified] : b/l LE extremely tender to light palpation from posterior knee to toes [de-identified] : w/ decreased sensation to monofilament testing on plantar surfaces of feet b/l

## 2024-01-05 NOTE — H&P ADULT - PROBLEM SELECTOR PLAN 1
-  sent in from dialysis center after not being allowed to get dialysis secondary to a fever, cough, sob x1d consistent w/ flu like symptoms, showing signs of fluid overload on exam.    - Vitals sig for /85.   - EKG- NS, non-peaked T waves.   - Labs sig for K 6.2, BUN/Cr 60/9.85 (baseline 6-7).   - s/p Lokelma and calcium gluconate.   - For HD w/ hyperfiltration today.   - f/u labs s/p HD    - Dr. Estrada for Nephro - sent in from dialysis center after not being allowed to get dialysis secondary to a fever, cough, sob x1d consistent w/ flu like symptoms, showing signs of fluid overload on exam.    - Vitals sig for /85.   - EKG- NS, non-peaked T waves.   - Labs sig for K 6.2, BUN/Cr 60/9.85 (baseline 6-7).   - s/p Lokelma and calcium gluconate.   - For HD w/ hyperfiltration today.   - f/u labs s/p HD    - Dr. Estrada for Nephro

## 2024-01-05 NOTE — H&P ADULT - NSHPREVIEWOFSYSTEMS_GEN_ALL_CORE
REVIEW OF SYSTEMS:    CONSTITUTIONAL: No weakness, (+) fevers or chills  EYES/ENT: No visual changes;  No vertigo or throat pain   NECK: No pain or stiffness  RESPIRATORY: (+) cough, wheezing, hemoptysis; (+) shortness of breath  CARDIOVASCULAR: No chest pain or palpitations  GASTROINTESTINAL: No abdominal or epigastric pain. No nausea, vomiting, or hematemesis; No diarrhea or constipation. No melena or hematochezia.  GENITOURINARY: No dysuria, frequency or hematuria  NEUROLOGICAL: No numbness or weakness  SKIN: No itching, burning, rashes, or lesions   All other review of systems is negative unless indicated above.

## 2024-01-05 NOTE — H&P ADULT - HISTORY OF PRESENT ILLNESS
Patient is a 53 year old female from home, ambulates independently, with PMH of HTN, DM2, ESRD on HD M/W/F, sarcoidosis, anemia, seizure disorder (no longer on meds) who presented to the ED because she was sent in from dialysis center after not being allowed to get dialysis secondary to a fever.  Associated with nonproductive cough and bodyaches x 1 day.  Denies other acute complaints. Sick contacts ____. Recent travel _____.  Patient is a 53 year old female from home, ambulates independently, with PMH of HTN, DM2, ESRD on HD M/W/F, sarcoidosis, anemia, seizure disorder (no longer on meds) who presented to the ED because she was sent in from dialysis center after not being allowed to get dialysis secondary to a fever and elevated BP 190s and elevated HR at HD. Pt started experiencing non-productive cough, body aches and chills x 1 day, and she also noticed SOB when lying flat. Denies chest pain, palpitations, n/v/d/constipation. Pt stated that her son had some cold symptoms. No recent travel .

## 2024-01-05 NOTE — ED PROVIDER NOTE - PROGRESS NOTE DETAILS
Flu/COVID-negative  Labs with potassium 6.2  Spoke with nephrology Dr. Estrada who plans for admission and dialysis today  Accepted for readmission by Dr. Meyer  Endorse MAR EKG on my interpretation normal sinus rhythm, rate 95, QTc 449, lateral T wave inversion in V6 but no other ischemic changes, no ectopy

## 2024-01-05 NOTE — H&P ADULT - PROBLEM SELECTOR PLAN 2
- ESRD on HD MWF  - plan as above - ESRD on HD MWF  - CXR w/ blunting of the left costophrenic angle, increased lower extremity edema    - plan as above

## 2024-01-05 NOTE — ED PROVIDER NOTE - OBJECTIVE STATEMENT
54-year-old female past medical history of hypertension, diabetes, end-stage renal disease on hemodialysis M/W/F, sarcoidosis, seizure disorder, sent in from dialysis center after not being allowed to get dialysis secondary to fever.  Associated with nonproductive cough and bodyaches x 1 day.  Denies other acute complaints

## 2024-01-05 NOTE — CONSULT NOTE ADULT - SUBJECTIVE AND OBJECTIVE BOX
[facility] : Progress Note :: [phone], (office [phone]),   Dr Estrada / Dr Das / Dr Gale / Dr Vega / Dr Debbie FORD / Dr Garcia / Dr Chandra / Dr Jona pal  _____________________________________________________________________________________________  Patient is a 53y Female with ESRD on HD MWF, HTN, seizure, sarcoidosis,  whom was sent from hemodialysis unit (SQ) with shortness of breath and elevated BP. In  ED fluid overloaded and with hyperkalemia. COVID 19 negative.  renal consulted for ESRD and hyperkalemia.    PAST MEDICAL & SURGICAL HISTORY:  Sarcoidosis      Diabetes      Hypertension      Hyperlipidemia      Chronic kidney disease (CKD), stage III (moderate)      Seizure disorder      No significant past surgical history        No Known Allergies    Home Medications Reviewed  Hospital Medications:   MEDICATIONS  (STANDING):    SOCIAL HISTORY:  Denies ETOh,Smoking,   FAMILY HISTORY:  Family history of diabetes mellitus in first degree relative          VITALS:  T(F): 99.6 (01-05-24 @ 13:25), Max: 99.6 (01-05-24 @ 13:25)  HR: 93 (01-05-24 @ 15:30)  BP: 157/85 (01-05-24 @ 13:25)  RR: 18 (01-05-24 @ 13:25)  SpO2: 96% (01-05-24 @ 13:25)  Wt(kg): --    Height (cm): 155 (01-05 @ 13:25)  Weight (kg): 46.3 (01-05 @ 13:25)  BMI (kg/m2): 19.3 (01-05 @ 13:25)  BSA (m2): 1.42 (01-05 @ 13:25)  PHYSICAL EXAM:  Constitutional: NAD  HEENT: anicteric sclera, oropharynx clear,  Neck: No JVD  Respiratory: CTAB, no wheezes, rales or rhonchi  Cardiovascular: S1, S2, RRR  Gastrointestinal: BS+, soft, NT/ND  Extremities:  peripheral edema++  Neurological: A/O x 3, no focal deficits  : No CVA tenderness. No rai.   Vascular Access: IJ permacath     LABS:  01-05    134<L>  |  103  |  60<H>  ----------------------------<  172<H>  6.2<HH>   |  22  |  9.85<H>    Ca    8.8      05 Jan 2024 14:09    TPro  7.0  /  Alb  2.8<L>  /  TBili  0.4  /  DBili      /  AST  24  /  ALT  14  /  AlkPhos  55  01-05    Creatinine Trend: 9.85 <--                        11.3   7.68  )-----------( 263      ( 05 Jan 2024 14:09 )             36.7     Urine Studies:  Urinalysis Basic - ( 05 Jan 2024 14:09 )    Color:  / Appearance:  / SG:  / pH:   Gluc: 172 mg/dL / Ketone:   / Bili:  / Urobili:    Blood:  / Protein:  / Nitrite:    Leuk Esterase:  / RBC:  / WBC    Sq Epi:  / Non Sq Epi:  / Bacteria:         RADIOLOGY & ADDITIONAL STUDIES:                 [facility] : Progress Note :: [phone], (office [phone]),   Dr Estrada / Dr Das / Dr Gale / Dr Vega / Dr Debbie FORD / Dr Garcia / Dr Chandra / Dr Jona pal  _____________________________________________________________________________________________  Patient is a 53y Female with ESRD on HD MWF, HTN, seizure, sarcoidosis,  whom was sent from hemodialysis unit (SQ) with shortness of breath and elevated BP. In  ED fluid overloaded and with hyperkalemia. COVID 19 negative.  renal consulted for ESRD and hyperkalemia.    PAST MEDICAL & SURGICAL HISTORY:  Sarcoidosis      Diabetes      Hypertension      Hyperlipidemia      Chronic kidney disease (CKD), stage III (moderate)      Seizure disorder      No significant past surgical history        No Known Allergies    Home Medications Reviewed  Hospital Medications:   MEDICATIONS  (STANDING):    SOCIAL HISTORY:  Denies ETOh,Smoking,   FAMILY HISTORY:  Family history of diabetes mellitus in first degree relative          VITALS:  T(F): 99.6 (01-05-24 @ 13:25), Max: 99.6 (01-05-24 @ 13:25)  HR: 93 (01-05-24 @ 15:30)  BP: 157/85 (01-05-24 @ 13:25)  RR: 18 (01-05-24 @ 13:25)  SpO2: 96% (01-05-24 @ 13:25)  Wt(kg): --    Height (cm): 155 (01-05 @ 13:25)  Weight (kg): 46.3 (01-05 @ 13:25)  BMI (kg/m2): 19.3 (01-05 @ 13:25)  BSA (m2): 1.42 (01-05 @ 13:25)  PHYSICAL EXAM:  Constitutional: NAD  HEENT: anicteric sclera, oropharynx clear,  Neck: No JVD  Respiratory: CTAB, no wheezes, rales or rhonchi  Cardiovascular: S1, S2, RRR  Gastrointestinal: BS+, soft, NT/ND  Extremities:  peripheral edema++  Neurological: A/O x 3, no focal deficits  : No CVA tenderness. No rai.   Vascular Access: IJ permacath     LABS:  01-05    134<L>  |  103  |  60<H>  ----------------------------<  172<H>  6.2<HH>   |  22  |  9.85<H>    Ca    8.8      05 Jan 2024 14:09    TPro  7.0  /  Alb  2.8<L>  /  TBili  0.4  /  DBili      /  AST  24  /  ALT  14  /  AlkPhos  55  01-05    Creatinine Trend: 9.85 <--                        11.3   7.68  )-----------( 263      ( 05 Jan 2024 14:09 )             36.7     Urine Studies:  Urinalysis Basic - ( 05 Jan 2024 14:09 )    Color:  / Appearance:  / SG:  / pH:   Gluc: 172 mg/dL / Ketone:   / Bili:  / Urobili:    Blood:  / Protein:  / Nitrite:    Leuk Esterase:  / RBC:  / WBC    Sq Epi:  / Non Sq Epi:  / Bacteria:         RADIOLOGY & ADDITIONAL STUDIES:

## 2024-01-05 NOTE — H&P ADULT - NSHPPHYSICALEXAM_GEN_ALL_CORE
T(C): 37.2 (01-05-24 @ 17:05), Max: 37.6 (01-05-24 @ 13:25)  HR: 91 (01-05-24 @ 17:05) (91 - 104)  BP: 143/80 (01-05-24 @ 17:05) (143/80 - 157/85)  RR: 15 (01-05-24 @ 17:05) (15 - 18)  SpO2: 97% (01-05-24 @ 17:05) (91% - 97%)    CONSTITUTIONAL: Well groomed, no apparent distress  EYES: PERRLA and symmetric, EOMI, No conjunctival or scleral injection, non-icteric  ENMT: Oral mucosa with moist membranes.  RESP: No respiratory distress, no use of accessory muscles; CTA b/l, no WRR, (+) permacach noted on the right side of the chest   CV: RRR, +S1S2, no MRG; no JVD; (+) +2 pitting edema in b/l ankles and shins   GI: Soft, NT, ND, no rebound, no guarding; no palpable masses; no hepatosplenomegaly; no hernia palpated  MSK: Normal ROM without pain, no spinal tenderness, normal muscle strength/tone  SKIN: No rashes or ulcers noted; no subcutaneous nodules or induration palpable  NEURO: CN II-XII intact; normal reflexes in upper and lower extremities, sensation intact in upper and lower extremities b/l to light touch   PSYCH: Appropriate insight/judgment; A+O x 3, mood and affect appropriate, recent/remote memory intact

## 2024-01-05 NOTE — ED ADULT NURSE NOTE - NSFALLUNIVINTERV_ED_ALL_ED
Bed/Stretcher in lowest position, wheels locked, appropriate side rails in place/Call bell, personal items and telephone in reach/Instruct patient to call for assistance before getting out of bed/chair/stretcher/Non-slip footwear applied when patient is off stretcher/[place] to call system/Physically safe environment - no spills, clutter or unnecessary equipment/Purposeful proactive rounding/Room/bathroom lighting operational, light cord in reach Bed/Stretcher in lowest position, wheels locked, appropriate side rails in place/Call bell, personal items and telephone in reach/Instruct patient to call for assistance before getting out of bed/chair/stretcher/Non-slip footwear applied when patient is off stretcher/[place] to call system/Physically safe environment - no spills, clutter or unnecessary equipment/Purposeful proactive rounding/Room/bathroom lighting operational, light cord in reach

## 2024-01-05 NOTE — H&P ADULT - PROBLEM SELECTOR PLAN 5
- Patient on trulicity at home  - Start patient on Insulin sliding scale ACHS  - Start patient on Finger sticks ACHS  - Resume prior insulin dosing as indicated based on patients BG  - Start Diabetic Diet. - Patient on trulicity at home  - Start patient on Insulin sliding scale ACHS  - Start patient on Finger sticks ACHS  - Start lantus 10u, lispro 5u, can gradually increase to prior insulin dosing (lanuts 14, lispro 7 TID)   - Start Diabetic Diet.

## 2024-01-05 NOTE — ED ADULT NURSE NOTE - ED STAT RN HANDOFF DETAILS 2
pt endorsed to HOOD Velez pt mental status at baseline, no acute s/s of distress noted, safety maintained, awaiting Bed assignment.

## 2024-01-05 NOTE — ED ADULT NURSE NOTE - OBJECTIVE STATEMENT
pt reports she was going to dialysis today when they told her she was febrile, hypertensive and tachycardic. Pt denies recent falls, nausea or vomiting.

## 2024-01-05 NOTE — H&P ADULT - ASSESSMENT
Patient is a 53 year old female from home, ambulates independently, with PMH of HTN, DM2, ESRD on HD M/W/F, sarcoidosis, anemia, seizure disorder (no longer on meds) who presented to the ED because she was sent in from dialysis center after not being allowed to get dialysis secondary to a fever, cough, sob x1d consistent w/ flu like symptoms, showing signs of fluid overload on exam.  Vitals sig for /85. EKG- NS, non-peaked T waves. Labs sig for K 6.2, BUN/Cr 60/9.85 (baseline 6-7). s/p Lokelma and calcium gluconate. For HD w/ hyperfiltration today.  Patient is a 53 year old female from home, ambulates independently, with PMH of HTN, DM2, ESRD on HD M/W/F, sarcoidosis, anemia, seizure disorder (no longer on meds) who presented to the ED because she was sent in from dialysis center after not being allowed to get dialysis secondary to a fever, cough, sob x1d consistent w/ flu like symptoms, showing signs of fluid overload on exam.  Vitals sig for /85. EKG- NS, non-peaked T waves. Labs sig for K 6.2, BUN/Cr 60/9.85 (baseline 6-7). CXR blunting of left costophrenic angle. s/p Lokelma and calcium gluconate. Admitted for hyperkalemia, and fluid overload in the setting of ESRD undergoing HD w/ hyperfiltration today.

## 2024-01-05 NOTE — CONSULT NOTE ADULT - ASSESSMENT
# ESRD. Admitted with FLU-like symptoms, fluid overloaded and with hyperkalemia.   HD today with low K+ bath  # peripheral neuropathy, noted gabapentin dose increased  # HTN ultrafiltration on HD ,  Nifedipine was added to the regimen during last hospitalization  # Anemia of CKD. HGB at goal

## 2024-01-05 NOTE — HISTORY OF PRESENT ILLNESS
[FreeTextEntry1] : 53F presenting for hospital follow-up.  [de-identified] : 53 year old female from home with PMH of HTN, DM2, ESRD on HD M/W/F, sarcoidosis, anemia, seizure disorder (no longer on meds) presented to the ED with shortness of breath after missing a dialysis session.  In the ED patient was Hypertensive (205/111).  Patients lab work showing normal electrolytes and SCr of 9.74.  Patient admitted for hypertensive urgency and missed HD sessions. Nephrology was consulted, Dr. Estrada. She was given IV Labetalol and underwent HD. She was subsequently brought to the inpatient medicine floor where she received additional dose of Labetalol. However, her BP remained very elevated and so the pt was placed on Nifedipine 60mg and her home hydralazine was increased from two to three times daily. The pt stabilized on this regimen and was approved for discharge after an additional dialysis session. A1c in hospital was 6.6.   Meds on discharge:  inhaled every 4 hours, As Needed epoetin suzette: 4,000 unit(s) injectable 3 times a week Administer with HD hydrALAZINE 50 mg oral tablet: 1 tab(s) orally 3 times a day metOLazone 5 mg oral tablet: 1 tab(s) orally 2 times a day metoprolol succinate 25 mg oral tablet, extended release: 1 tab(s) orally once a day Neurontin 300 mg oral capsule: 1 cap(s) orally 2 times a day NIFEdipine 60 mg oral tablet, extended release: 1 tab(s) orally once a day predniSONE 10 mg oral tablet: 1 tab(s) orally once a day rosuvastatin 40 mg oral tablet: 1 tab(s) orally once a day SEVELAMER CARBONATE  800 MG TABS: 2 tab(s) orally 3 times a day traZODone 50 mg oral tablet: 1 tab(s) orally once a day (at bedtime)  Recent tele visit for severe posterior knee pain b/l - worsened during HD. Advised tylenol, lidocaine patch, warm compress. Radiculopathy vs muscle cramp vs electrolyte derangement.  Patient w/ same complaint at this visit. Notes severe pain beginning posterior knees, felt symmetrically throughout b/l lower extremities. First started 2-3 months ago and has been progressively worsening. Exacerbated during HD and experiences severe muscle cramping, sometimes requiring that she pause HD or stop completely unable to resume. Has tried taking tylenol, lidocaine patches, and increasingly higher doses of gabapentin w/o relief. Was on IV dilaudid while hospitalized end of December, which she reports worked well. Compression stockings have helped intermittently as well. Describes exquisite tenderness to light palpation, even having a blanket on her legs is painful. Pain has limited her ability to walk - currently using cane/walker; no falls. Denies fevers, chills, skin changes, back pain, joint pain, or discomfort in any other muscle groups. No trauma.

## 2024-01-05 NOTE — END OF VISIT
[] : Resident [FreeTextEntry3] : neuropathic pain/allodynia rec diurese to dry weight in hd as compressino has helped; elevate legs, avoid salt, trial of cyclobenzaprine, fu neurology

## 2024-01-05 NOTE — ED ADULT NURSE NOTE - ED STAT RN HANDOFF DETAILS
Pt lethergic but fully oriented to self place and time. no acute respiratory distress noted. Report given to Marci Toure. awaiting for transport.

## 2024-01-06 LAB
ALBUMIN SERPL ELPH-MCNC: 2.4 G/DL — LOW (ref 3.5–5)
ALBUMIN SERPL ELPH-MCNC: 2.4 G/DL — LOW (ref 3.5–5)
ALP SERPL-CCNC: 57 U/L — SIGNIFICANT CHANGE UP (ref 40–120)
ALP SERPL-CCNC: 57 U/L — SIGNIFICANT CHANGE UP (ref 40–120)
ALT FLD-CCNC: 12 U/L DA — SIGNIFICANT CHANGE UP (ref 10–60)
ALT FLD-CCNC: 12 U/L DA — SIGNIFICANT CHANGE UP (ref 10–60)
ANION GAP SERPL CALC-SCNC: 7 MMOL/L — SIGNIFICANT CHANGE UP (ref 5–17)
ANION GAP SERPL CALC-SCNC: 7 MMOL/L — SIGNIFICANT CHANGE UP (ref 5–17)
AST SERPL-CCNC: 14 U/L — SIGNIFICANT CHANGE UP (ref 10–40)
AST SERPL-CCNC: 14 U/L — SIGNIFICANT CHANGE UP (ref 10–40)
BASOPHILS # BLD AUTO: 0.02 K/UL — SIGNIFICANT CHANGE UP (ref 0–0.2)
BASOPHILS # BLD AUTO: 0.02 K/UL — SIGNIFICANT CHANGE UP (ref 0–0.2)
BASOPHILS NFR BLD AUTO: 0.4 % — SIGNIFICANT CHANGE UP (ref 0–2)
BASOPHILS NFR BLD AUTO: 0.4 % — SIGNIFICANT CHANGE UP (ref 0–2)
BILIRUB SERPL-MCNC: 0.5 MG/DL — SIGNIFICANT CHANGE UP (ref 0.2–1.2)
BILIRUB SERPL-MCNC: 0.5 MG/DL — SIGNIFICANT CHANGE UP (ref 0.2–1.2)
BUN SERPL-MCNC: 23 MG/DL — HIGH (ref 7–18)
BUN SERPL-MCNC: 23 MG/DL — HIGH (ref 7–18)
CALCIUM SERPL-MCNC: 8.9 MG/DL — SIGNIFICANT CHANGE UP (ref 8.4–10.5)
CALCIUM SERPL-MCNC: 8.9 MG/DL — SIGNIFICANT CHANGE UP (ref 8.4–10.5)
CHLORIDE SERPL-SCNC: 100 MMOL/L — SIGNIFICANT CHANGE UP (ref 96–108)
CHLORIDE SERPL-SCNC: 100 MMOL/L — SIGNIFICANT CHANGE UP (ref 96–108)
CO2 SERPL-SCNC: 28 MMOL/L — SIGNIFICANT CHANGE UP (ref 22–31)
CO2 SERPL-SCNC: 28 MMOL/L — SIGNIFICANT CHANGE UP (ref 22–31)
CREAT SERPL-MCNC: 5.19 MG/DL — HIGH (ref 0.5–1.3)
CREAT SERPL-MCNC: 5.19 MG/DL — HIGH (ref 0.5–1.3)
EGFR: 9 ML/MIN/1.73M2 — LOW
EGFR: 9 ML/MIN/1.73M2 — LOW
EOSINOPHIL # BLD AUTO: 0.23 K/UL — SIGNIFICANT CHANGE UP (ref 0–0.5)
EOSINOPHIL # BLD AUTO: 0.23 K/UL — SIGNIFICANT CHANGE UP (ref 0–0.5)
EOSINOPHIL NFR BLD AUTO: 4.7 % — SIGNIFICANT CHANGE UP (ref 0–6)
EOSINOPHIL NFR BLD AUTO: 4.7 % — SIGNIFICANT CHANGE UP (ref 0–6)
GLUCOSE BLDC GLUCOMTR-MCNC: 107 MG/DL — HIGH (ref 70–99)
GLUCOSE BLDC GLUCOMTR-MCNC: 107 MG/DL — HIGH (ref 70–99)
GLUCOSE BLDC GLUCOMTR-MCNC: 111 MG/DL — HIGH (ref 70–99)
GLUCOSE BLDC GLUCOMTR-MCNC: 111 MG/DL — HIGH (ref 70–99)
GLUCOSE BLDC GLUCOMTR-MCNC: 192 MG/DL — HIGH (ref 70–99)
GLUCOSE BLDC GLUCOMTR-MCNC: 192 MG/DL — HIGH (ref 70–99)
GLUCOSE BLDC GLUCOMTR-MCNC: 225 MG/DL — HIGH (ref 70–99)
GLUCOSE BLDC GLUCOMTR-MCNC: 225 MG/DL — HIGH (ref 70–99)
GLUCOSE SERPL-MCNC: 91 MG/DL — SIGNIFICANT CHANGE UP (ref 70–99)
GLUCOSE SERPL-MCNC: 91 MG/DL — SIGNIFICANT CHANGE UP (ref 70–99)
HCT VFR BLD CALC: 32.8 % — LOW (ref 34.5–45)
HCT VFR BLD CALC: 32.8 % — LOW (ref 34.5–45)
HGB BLD-MCNC: 10.4 G/DL — LOW (ref 11.5–15.5)
HGB BLD-MCNC: 10.4 G/DL — LOW (ref 11.5–15.5)
IMM GRANULOCYTES NFR BLD AUTO: 0.4 % — SIGNIFICANT CHANGE UP (ref 0–0.9)
IMM GRANULOCYTES NFR BLD AUTO: 0.4 % — SIGNIFICANT CHANGE UP (ref 0–0.9)
LYMPHOCYTES # BLD AUTO: 1.25 K/UL — SIGNIFICANT CHANGE UP (ref 1–3.3)
LYMPHOCYTES # BLD AUTO: 1.25 K/UL — SIGNIFICANT CHANGE UP (ref 1–3.3)
LYMPHOCYTES # BLD AUTO: 25.7 % — SIGNIFICANT CHANGE UP (ref 13–44)
LYMPHOCYTES # BLD AUTO: 25.7 % — SIGNIFICANT CHANGE UP (ref 13–44)
MAGNESIUM SERPL-MCNC: 2.3 MG/DL — SIGNIFICANT CHANGE UP (ref 1.6–2.6)
MAGNESIUM SERPL-MCNC: 2.3 MG/DL — SIGNIFICANT CHANGE UP (ref 1.6–2.6)
MCHC RBC-ENTMCNC: 28.3 PG — SIGNIFICANT CHANGE UP (ref 27–34)
MCHC RBC-ENTMCNC: 28.3 PG — SIGNIFICANT CHANGE UP (ref 27–34)
MCHC RBC-ENTMCNC: 31.7 GM/DL — LOW (ref 32–36)
MCHC RBC-ENTMCNC: 31.7 GM/DL — LOW (ref 32–36)
MCV RBC AUTO: 89.1 FL — SIGNIFICANT CHANGE UP (ref 80–100)
MCV RBC AUTO: 89.1 FL — SIGNIFICANT CHANGE UP (ref 80–100)
MONOCYTES # BLD AUTO: 0.67 K/UL — SIGNIFICANT CHANGE UP (ref 0–0.9)
MONOCYTES # BLD AUTO: 0.67 K/UL — SIGNIFICANT CHANGE UP (ref 0–0.9)
MONOCYTES NFR BLD AUTO: 13.8 % — SIGNIFICANT CHANGE UP (ref 2–14)
MONOCYTES NFR BLD AUTO: 13.8 % — SIGNIFICANT CHANGE UP (ref 2–14)
NEUTROPHILS # BLD AUTO: 2.67 K/UL — SIGNIFICANT CHANGE UP (ref 1.8–7.4)
NEUTROPHILS # BLD AUTO: 2.67 K/UL — SIGNIFICANT CHANGE UP (ref 1.8–7.4)
NEUTROPHILS NFR BLD AUTO: 55 % — SIGNIFICANT CHANGE UP (ref 43–77)
NEUTROPHILS NFR BLD AUTO: 55 % — SIGNIFICANT CHANGE UP (ref 43–77)
NRBC # BLD: 0 /100 WBCS — SIGNIFICANT CHANGE UP (ref 0–0)
NRBC # BLD: 0 /100 WBCS — SIGNIFICANT CHANGE UP (ref 0–0)
PHOSPHATE SERPL-MCNC: 4.4 MG/DL — SIGNIFICANT CHANGE UP (ref 2.5–4.5)
PHOSPHATE SERPL-MCNC: 4.4 MG/DL — SIGNIFICANT CHANGE UP (ref 2.5–4.5)
PLATELET # BLD AUTO: 197 K/UL — SIGNIFICANT CHANGE UP (ref 150–400)
PLATELET # BLD AUTO: 197 K/UL — SIGNIFICANT CHANGE UP (ref 150–400)
POTASSIUM SERPL-MCNC: 3.8 MMOL/L — SIGNIFICANT CHANGE UP (ref 3.5–5.3)
POTASSIUM SERPL-MCNC: 3.8 MMOL/L — SIGNIFICANT CHANGE UP (ref 3.5–5.3)
POTASSIUM SERPL-SCNC: 3.8 MMOL/L — SIGNIFICANT CHANGE UP (ref 3.5–5.3)
POTASSIUM SERPL-SCNC: 3.8 MMOL/L — SIGNIFICANT CHANGE UP (ref 3.5–5.3)
PROT SERPL-MCNC: 6.3 G/DL — SIGNIFICANT CHANGE UP (ref 6–8.3)
PROT SERPL-MCNC: 6.3 G/DL — SIGNIFICANT CHANGE UP (ref 6–8.3)
RBC # BLD: 3.68 M/UL — LOW (ref 3.8–5.2)
RBC # BLD: 3.68 M/UL — LOW (ref 3.8–5.2)
RBC # FLD: 16.2 % — HIGH (ref 10.3–14.5)
RBC # FLD: 16.2 % — HIGH (ref 10.3–14.5)
SODIUM SERPL-SCNC: 135 MMOL/L — SIGNIFICANT CHANGE UP (ref 135–145)
SODIUM SERPL-SCNC: 135 MMOL/L — SIGNIFICANT CHANGE UP (ref 135–145)
WBC # BLD: 4.86 K/UL — SIGNIFICANT CHANGE UP (ref 3.8–10.5)
WBC # BLD: 4.86 K/UL — SIGNIFICANT CHANGE UP (ref 3.8–10.5)
WBC # FLD AUTO: 4.86 K/UL — SIGNIFICANT CHANGE UP (ref 3.8–10.5)
WBC # FLD AUTO: 4.86 K/UL — SIGNIFICANT CHANGE UP (ref 3.8–10.5)

## 2024-01-06 PROCEDURE — 93010 ELECTROCARDIOGRAM REPORT: CPT

## 2024-01-06 RX ORDER — METOPROLOL TARTRATE 50 MG
12.5 TABLET ORAL ONCE
Refills: 0 | Status: COMPLETED | OUTPATIENT
Start: 2024-01-06 | End: 2024-01-06

## 2024-01-06 RX ORDER — INSULIN GLARGINE 100 [IU]/ML
8 INJECTION, SOLUTION SUBCUTANEOUS AT BEDTIME
Refills: 0 | Status: DISCONTINUED | OUTPATIENT
Start: 2024-01-06 | End: 2024-01-07

## 2024-01-06 RX ORDER — ACETAMINOPHEN 500 MG
650 TABLET ORAL ONCE
Refills: 0 | Status: COMPLETED | OUTPATIENT
Start: 2024-01-06 | End: 2024-01-06

## 2024-01-06 RX ORDER — INFLUENZA VIRUS VACCINE 15; 15; 15; 15 UG/.5ML; UG/.5ML; UG/.5ML; UG/.5ML
0.5 SUSPENSION INTRAMUSCULAR ONCE
Refills: 0 | Status: COMPLETED | OUTPATIENT
Start: 2024-01-06 | End: 2024-01-06

## 2024-01-06 RX ADMIN — ATORVASTATIN CALCIUM 80 MILLIGRAM(S): 80 TABLET, FILM COATED ORAL at 22:18

## 2024-01-06 RX ADMIN — Medication 5 UNIT(S): at 17:45

## 2024-01-06 RX ADMIN — Medication 12.5 MILLIGRAM(S): at 23:44

## 2024-01-06 RX ADMIN — Medication 650 MILLIGRAM(S): at 00:30

## 2024-01-06 RX ADMIN — GABAPENTIN 300 MILLIGRAM(S): 400 CAPSULE ORAL at 19:19

## 2024-01-06 RX ADMIN — GABAPENTIN 300 MILLIGRAM(S): 400 CAPSULE ORAL at 06:30

## 2024-01-06 RX ADMIN — Medication 25 MILLIGRAM(S): at 06:30

## 2024-01-06 RX ADMIN — SEVELAMER CARBONATE 1600 MILLIGRAM(S): 2400 POWDER, FOR SUSPENSION ORAL at 14:03

## 2024-01-06 RX ADMIN — Medication 50 MILLIGRAM(S): at 06:30

## 2024-01-06 RX ADMIN — HEPARIN SODIUM 5000 UNIT(S): 5000 INJECTION INTRAVENOUS; SUBCUTANEOUS at 14:03

## 2024-01-06 RX ADMIN — Medication 1: at 17:45

## 2024-01-06 RX ADMIN — Medication 10 MILLIGRAM(S): at 06:31

## 2024-01-06 RX ADMIN — ATORVASTATIN CALCIUM 80 MILLIGRAM(S): 80 TABLET, FILM COATED ORAL at 03:13

## 2024-01-06 RX ADMIN — Medication 60 MILLIGRAM(S): at 06:31

## 2024-01-06 RX ADMIN — Medication 50 MILLIGRAM(S): at 14:03

## 2024-01-06 RX ADMIN — Medication 50 MILLIGRAM(S): at 22:18

## 2024-01-06 RX ADMIN — INSULIN GLARGINE 8 UNIT(S): 100 INJECTION, SOLUTION SUBCUTANEOUS at 22:14

## 2024-01-06 RX ADMIN — Medication 1 TABLET(S): at 19:18

## 2024-01-06 RX ADMIN — ERGOCALCIFEROL 50000 UNIT(S): 1.25 CAPSULE ORAL at 19:19

## 2024-01-06 RX ADMIN — Medication 650 MILLIGRAM(S): at 00:28

## 2024-01-06 RX ADMIN — Medication 50 MILLIGRAM(S): at 22:20

## 2024-01-06 RX ADMIN — SEVELAMER CARBONATE 1600 MILLIGRAM(S): 2400 POWDER, FOR SUSPENSION ORAL at 22:21

## 2024-01-06 RX ADMIN — HEPARIN SODIUM 5000 UNIT(S): 5000 INJECTION INTRAVENOUS; SUBCUTANEOUS at 06:30

## 2024-01-06 RX ADMIN — Medication 50 MILLIGRAM(S): at 03:13

## 2024-01-06 RX ADMIN — Medication 325 MILLIGRAM(S): at 14:03

## 2024-01-06 NOTE — PROGRESS NOTE ADULT - SUBJECTIVE AND OBJECTIVE BOX
[facility] - JOSE Chandra / JOSE Vega / LARA Garcia/ JOSE Lewis/ JOSE Gale/ BHARATI Das / DAWSON Estrada / JOSE RAFAEL Hirsch  ---------------------------------------------------------------------------------------------------------------  seen and examined today for ESRD  Interval : NAD  VITALS:  T(F): 98.8 (01-06-24 @ 11:13), Max: 102.8 (01-06-24 @ 00:28)  HR: 93 (01-06-24 @ 11:13)  BP: 154/80 (01-06-24 @ 11:13)  RR: 18 (01-06-24 @ 11:13)  SpO2: 99% (01-06-24 @ 11:13)  Wt(kg): --    Physical Exam :-  Constitutional: NAD  Neck: Supple.  Respiratory: Bilateral equal breath sounds,  Cardiovascular: S1, S2 normal,  Gastrointestinal: Bowel Sounds present, soft, non tender.  Extremities: No edema  Neurological: Alert and Oriented x 3, no focal deficits  Psychiatric: Normal mood, normal affect  Data:-  Allergies :   No Known Allergies    Hospital Medications:   MEDICATIONS  (STANDING):  atorvastatin 80 milliGRAM(s) Oral at bedtime  ergocalciferol 23298 Unit(s) Oral every week  ferrous    sulfate 325 milliGRAM(s) Oral daily  gabapentin 300 milliGRAM(s) Oral two times a day  heparin   Injectable 5000 Unit(s) SubCutaneous every 8 hours  hydrALAZINE 50 milliGRAM(s) Oral three times a day  insulin glargine Injectable (LANTUS) 8 Unit(s) SubCutaneous at bedtime  insulin lispro (ADMELOG) corrective regimen sliding scale   SubCutaneous Before meals and at bedtime  insulin lispro Injectable (ADMELOG) 5 Unit(s) SubCutaneous three times a day before meals  lactobacillus acidophilus 1 Tablet(s) Oral daily  metolazone 5 milliGRAM(s) Oral <User Schedule>  metoprolol succinate ER 25 milliGRAM(s) Oral daily  NIFEdipine XL 60 milliGRAM(s) Oral daily  predniSONE   Tablet 10 milliGRAM(s) Oral daily  sevelamer carbonate 1600 milliGRAM(s) Oral three times a day with meals  traZODone 50 milliGRAM(s) Oral at bedtime    01-06    135  |  100  |  23<H>  ----------------------------<  91  3.8   |  28  |  5.19<H>    Ca    8.9      06 Jan 2024 06:15  Phos  4.4     01-06  Mg     2.3     01-06    TPro  6.3  /  Alb  2.4<L>  /  TBili  0.5  /  DBili      /  AST  14  /  ALT  12  /  AlkPhos  57  01-06    Creatinine Trend: 5.19 <--, 9.85 <--                        10.4   4.86  )-----------( 197      ( 06 Jan 2024 06:15 )             32.8    [facility] - JOSE Chandra / JOSE Vega / LARA Garcia/ JOSE Lewis/ JOSE Gale/ BHARATI Das / DAWSON Estrada / JOSE RAFAEL Hirsch  ---------------------------------------------------------------------------------------------------------------  seen and examined today for ESRD  Interval : NAD  VITALS:  T(F): 98.8 (01-06-24 @ 11:13), Max: 102.8 (01-06-24 @ 00:28)  HR: 93 (01-06-24 @ 11:13)  BP: 154/80 (01-06-24 @ 11:13)  RR: 18 (01-06-24 @ 11:13)  SpO2: 99% (01-06-24 @ 11:13)  Wt(kg): --    Physical Exam :-  Constitutional: NAD  Neck: Supple.  Respiratory: Bilateral equal breath sounds,  Cardiovascular: S1, S2 normal,  Gastrointestinal: Bowel Sounds present, soft, non tender.  Extremities: No edema  Neurological: Alert and Oriented x 3, no focal deficits  Psychiatric: Normal mood, normal affect  Data:-  Allergies :   No Known Allergies    Hospital Medications:   MEDICATIONS  (STANDING):  atorvastatin 80 milliGRAM(s) Oral at bedtime  ergocalciferol 11454 Unit(s) Oral every week  ferrous    sulfate 325 milliGRAM(s) Oral daily  gabapentin 300 milliGRAM(s) Oral two times a day  heparin   Injectable 5000 Unit(s) SubCutaneous every 8 hours  hydrALAZINE 50 milliGRAM(s) Oral three times a day  insulin glargine Injectable (LANTUS) 8 Unit(s) SubCutaneous at bedtime  insulin lispro (ADMELOG) corrective regimen sliding scale   SubCutaneous Before meals and at bedtime  insulin lispro Injectable (ADMELOG) 5 Unit(s) SubCutaneous three times a day before meals  lactobacillus acidophilus 1 Tablet(s) Oral daily  metolazone 5 milliGRAM(s) Oral <User Schedule>  metoprolol succinate ER 25 milliGRAM(s) Oral daily  NIFEdipine XL 60 milliGRAM(s) Oral daily  predniSONE   Tablet 10 milliGRAM(s) Oral daily  sevelamer carbonate 1600 milliGRAM(s) Oral three times a day with meals  traZODone 50 milliGRAM(s) Oral at bedtime    01-06    135  |  100  |  23<H>  ----------------------------<  91  3.8   |  28  |  5.19<H>    Ca    8.9      06 Jan 2024 06:15  Phos  4.4     01-06  Mg     2.3     01-06    TPro  6.3  /  Alb  2.4<L>  /  TBili  0.5  /  DBili      /  AST  14  /  ALT  12  /  AlkPhos  57  01-06    Creatinine Trend: 5.19 <--, 9.85 <--                        10.4   4.86  )-----------( 197      ( 06 Jan 2024 06:15 )             32.8

## 2024-01-06 NOTE — PROGRESS NOTE ADULT - SUBJECTIVE AND OBJECTIVE BOX
Patient is a 53y old  Female who presents with a chief complaint of hyperkalemia/ESRD (05 Jan 2024 15:34)      INTERVAL HPI/OVERNIGHT EVENTS:  T(C): 37.1 (01-06-24 @ 11:13), Max: 39.3 (01-06-24 @ 00:28)  HR: 93 (01-06-24 @ 11:13) (89 - 111)  BP: 154/80 (01-06-24 @ 11:13) (143/80 - 177/95)  RR: 18 (01-06-24 @ 11:13) (15 - 18)  SpO2: 99% (01-06-24 @ 11:13) (94% - 99%)  Wt(kg): --  I&O's Summary      LABS:                        10.4   4.86  )-----------( 197      ( 06 Jan 2024 06:15 )             32.8     01-06    135  |  100  |  23<H>  ----------------------------<  91  3.8   |  28  |  5.19<H>    Ca    8.9      06 Jan 2024 06:15  Phos  4.4     01-06  Mg     2.3     01-06    TPro  6.3  /  Alb  2.4<L>  /  TBili  0.5  /  DBili  x   /  AST  14  /  ALT  12  /  AlkPhos  57  01-06      Urinalysis Basic - ( 06 Jan 2024 06:15 )    Color: x / Appearance: x / SG: x / pH: x  Gluc: 91 mg/dL / Ketone: x  / Bili: x / Urobili: x   Blood: x / Protein: x / Nitrite: x   Leuk Esterase: x / RBC: x / WBC x   Sq Epi: x / Non Sq Epi: x / Bacteria: x      CAPILLARY BLOOD GLUCOSE      POCT Blood Glucose.: 225 mg/dL (06 Jan 2024 11:48)  POCT Blood Glucose.: 107 mg/dL (06 Jan 2024 07:48)        RADIOLOGY & ADDITIONAL TESTS:    Consultant(s) Notes Reviewed:  [x ] YES  [ ] NO    MEDICATIONS  (STANDING):  atorvastatin 80 milliGRAM(s) Oral at bedtime  ergocalciferol 61918 Unit(s) Oral every week  ferrous    sulfate 325 milliGRAM(s) Oral daily  gabapentin 300 milliGRAM(s) Oral two times a day  heparin   Injectable 5000 Unit(s) SubCutaneous every 8 hours  hydrALAZINE 50 milliGRAM(s) Oral three times a day  insulin glargine Injectable (LANTUS) 8 Unit(s) SubCutaneous at bedtime  insulin lispro (ADMELOG) corrective regimen sliding scale   SubCutaneous Before meals and at bedtime  insulin lispro Injectable (ADMELOG) 5 Unit(s) SubCutaneous three times a day before meals  lactobacillus acidophilus 1 Tablet(s) Oral daily  metolazone 5 milliGRAM(s) Oral <User Schedule>  metoprolol succinate ER 25 milliGRAM(s) Oral daily  NIFEdipine XL 60 milliGRAM(s) Oral daily  predniSONE   Tablet 10 milliGRAM(s) Oral daily  sevelamer carbonate 1600 milliGRAM(s) Oral three times a day with meals  traZODone 50 milliGRAM(s) Oral at bedtime    MEDICATIONS  (PRN):  albuterol    90 MICROgram(s) HFA Inhaler 2 Puff(s) Inhalation every 4 hours PRN Shortness of Breath and/or Wheezing      PHYSICAL EXAM:  GENERAL: well built, well nourished  HEAD:  Atraumatic, Normocephalic  EYES: EOMI, PERRLA, conjunctiva and sclera clear  ENT: No tonsillar erythema, exudates, or enlargement; Moist mucous membranes, Good dentition, No lesions  NECK: Supple, No JVD, Normal thyroid, no enlarged nodes  NERVOUS SYSTEM:  Alert & Oriented X2, Good concentration; Motor Strength 5/5 B/L upper and lower extremities; DTRs 2+ intact and symmetric, sensory intact  CHEST/LUNG: B/L fair air entry; No rales, rhonchi, or wheezing  HEART: S1S2 normal, no S3, Regular rate and rhythm; No murmurs, rubs, or gallops  ABDOMEN: Soft, Nontender, Nondistended; Bowel sounds present  EXTREMITIES:  2+ Peripheral Pulses, No clubbing, cyanosis, or edema  LYMPH: No lymphadenopathy noted  SKIN: No rashes or lesions    Care Discussed with Consultants/Other Providers [ x] YES  [ ] NO Patient is a 53y old  Female who presents with a chief complaint of hyperkalemia/ESRD (05 Jan 2024 15:34)      INTERVAL HPI/OVERNIGHT EVENTS:  T(C): 37.1 (01-06-24 @ 11:13), Max: 39.3 (01-06-24 @ 00:28)  HR: 93 (01-06-24 @ 11:13) (89 - 111)  BP: 154/80 (01-06-24 @ 11:13) (143/80 - 177/95)  RR: 18 (01-06-24 @ 11:13) (15 - 18)  SpO2: 99% (01-06-24 @ 11:13) (94% - 99%)  Wt(kg): --  I&O's Summary      LABS:                        10.4   4.86  )-----------( 197      ( 06 Jan 2024 06:15 )             32.8     01-06    135  |  100  |  23<H>  ----------------------------<  91  3.8   |  28  |  5.19<H>    Ca    8.9      06 Jan 2024 06:15  Phos  4.4     01-06  Mg     2.3     01-06    TPro  6.3  /  Alb  2.4<L>  /  TBili  0.5  /  DBili  x   /  AST  14  /  ALT  12  /  AlkPhos  57  01-06      Urinalysis Basic - ( 06 Jan 2024 06:15 )    Color: x / Appearance: x / SG: x / pH: x  Gluc: 91 mg/dL / Ketone: x  / Bili: x / Urobili: x   Blood: x / Protein: x / Nitrite: x   Leuk Esterase: x / RBC: x / WBC x   Sq Epi: x / Non Sq Epi: x / Bacteria: x      CAPILLARY BLOOD GLUCOSE      POCT Blood Glucose.: 225 mg/dL (06 Jan 2024 11:48)  POCT Blood Glucose.: 107 mg/dL (06 Jan 2024 07:48)        RADIOLOGY & ADDITIONAL TESTS:    Consultant(s) Notes Reviewed:  [x ] YES  [ ] NO    MEDICATIONS  (STANDING):  atorvastatin 80 milliGRAM(s) Oral at bedtime  ergocalciferol 90119 Unit(s) Oral every week  ferrous    sulfate 325 milliGRAM(s) Oral daily  gabapentin 300 milliGRAM(s) Oral two times a day  heparin   Injectable 5000 Unit(s) SubCutaneous every 8 hours  hydrALAZINE 50 milliGRAM(s) Oral three times a day  insulin glargine Injectable (LANTUS) 8 Unit(s) SubCutaneous at bedtime  insulin lispro (ADMELOG) corrective regimen sliding scale   SubCutaneous Before meals and at bedtime  insulin lispro Injectable (ADMELOG) 5 Unit(s) SubCutaneous three times a day before meals  lactobacillus acidophilus 1 Tablet(s) Oral daily  metolazone 5 milliGRAM(s) Oral <User Schedule>  metoprolol succinate ER 25 milliGRAM(s) Oral daily  NIFEdipine XL 60 milliGRAM(s) Oral daily  predniSONE   Tablet 10 milliGRAM(s) Oral daily  sevelamer carbonate 1600 milliGRAM(s) Oral three times a day with meals  traZODone 50 milliGRAM(s) Oral at bedtime    MEDICATIONS  (PRN):  albuterol    90 MICROgram(s) HFA Inhaler 2 Puff(s) Inhalation every 4 hours PRN Shortness of Breath and/or Wheezing      PHYSICAL EXAM:  GENERAL: well built, well nourished  HEAD:  Atraumatic, Normocephalic  EYES: EOMI, PERRLA, conjunctiva and sclera clear  ENT: No tonsillar erythema, exudates, or enlargement; Moist mucous membranes, Good dentition, No lesions  NECK: Supple, No JVD, Normal thyroid, no enlarged nodes  NERVOUS SYSTEM:  Alert & Oriented X2, Good concentration; Motor Strength 5/5 B/L upper and lower extremities; DTRs 2+ intact and symmetric, sensory intact  CHEST/LUNG: B/L fair air entry; No rales, rhonchi, or wheezing  HEART: S1S2 normal, no S3, Regular rate and rhythm; No murmurs, rubs, or gallops  ABDOMEN: Soft, Nontender, Nondistended; Bowel sounds present  EXTREMITIES:  2+ Peripheral Pulses, No clubbing, cyanosis, or edema  LYMPH: No lymphadenopathy noted  SKIN: No rashes or lesions    Care Discussed with Consultants/Other Providers [ x] YES  [ ] NO

## 2024-01-06 NOTE — PROGRESS NOTE ADULT - ASSESSMENT
# ESRD. Admitted with FLU-like symptoms, fluid overloaded and with hyperkalemia.   tolerated HD well yesterday  next HD session for Monday  # peripheral neuropathy, noted gabapentin dose increased  # HTN ultrafiltration on HD ,  Nifedipine was added to the regimen during last hospitalization  # Anemia of CKD. HGB at goal       For any question, call:  Cell # [phone]  Pager # [phone]  Callback # [phone] # ESRD. Admitted with FLU-like symptoms, fluid overloaded and with hyperkalemia.   tolerated HD well yesterday  next HD session for Monday  # peripheral neuropathy, noted gabapentin dose increased  # HTN ultrafiltration on HD ,  Nifedipine was added to the regimen during last hospitalization  # Anemia of CKD. HGB at goal       For any question, call:  Cell # [phone]  Pager # [phone]  Callback # [phone]

## 2024-01-06 NOTE — PATIENT PROFILE ADULT - FALL HARM RISK - HARM RISK INTERVENTIONS
Assistance with ambulation/Assistance OOB with selected safe patient handling equipment/Communicate Risk of Fall with Harm to all staff/Reinforce activity limits and safety measures with patient and family/Tailored Fall Risk Interventions/Visual Cue: Yellow wristband and red socks/Bed in lowest position, wheels locked, appropriate side rails in place/Call bell, personal items and telephone in reach/Instruct patient to call for assistance before getting out of bed or chair/Non-slip footwear when patient is out of bed/[place] to call system/Physically safe environment - no spills, clutter or unnecessary equipment/Purposeful Proactive Rounding/Room/bathroom lighting operational, light cord in reach Assistance with ambulation/Assistance OOB with selected safe patient handling equipment/Communicate Risk of Fall with Harm to all staff/Reinforce activity limits and safety measures with patient and family/Tailored Fall Risk Interventions/Visual Cue: Yellow wristband and red socks/Bed in lowest position, wheels locked, appropriate side rails in place/Call bell, personal items and telephone in reach/Instruct patient to call for assistance before getting out of bed or chair/Non-slip footwear when patient is out of bed/[place] to call system/Physically safe environment - no spills, clutter or unnecessary equipment/Purposeful Proactive Rounding/Room/bathroom lighting operational, light cord in reach

## 2024-01-06 NOTE — PROGRESS NOTE ADULT - PROBLEM SELECTOR PLAN 2
- ESRD on HD MWF  - CXR w/ blunting of the left costophrenic angle, increased lower extremity edema    - plan as above

## 2024-01-06 NOTE — PROGRESS NOTE ADULT - ASSESSMENT
Patient is a 53 year old female from home, ambulates independently, with PMH of HTN, DM2, ESRD on HD M/W/F, sarcoidosis, anemia, seizure disorder (no longer on meds) who presented to the ED because she was sent in from dialysis center after not being allowed to get dialysis secondary to a fever, cough, sob x1d consistent w/ flu like symptoms, showing signs of fluid overload on exam.  Vitals sig for /85. EKG- NS, non-peaked T waves. Labs sig for K 6.2, BUN/Cr 60/9.85 (baseline 6-7). CXR blunting of left costophrenic angle. s/p Lokelma and calcium gluconate. Admitted for hyperkalemia, and fluid overload in the setting of ESRD undergoing HD w/ hyperfiltration today.   Patient is positive for Rhinovirus and rapid test.  If remained stable and asymptomatic, discharge planning home.

## 2024-01-07 ENCOUNTER — TRANSCRIPTION ENCOUNTER (OUTPATIENT)
Age: 54
End: 2024-01-07

## 2024-01-07 VITALS
SYSTOLIC BLOOD PRESSURE: 145 MMHG | HEART RATE: 81 BPM | TEMPERATURE: 99 F | OXYGEN SATURATION: 95 % | RESPIRATION RATE: 17 BRPM | DIASTOLIC BLOOD PRESSURE: 69 MMHG

## 2024-01-07 LAB
ALBUMIN SERPL ELPH-MCNC: 2.4 G/DL — LOW (ref 3.5–5)
ALBUMIN SERPL ELPH-MCNC: 2.4 G/DL — LOW (ref 3.5–5)
ALP SERPL-CCNC: 60 U/L — SIGNIFICANT CHANGE UP (ref 40–120)
ALP SERPL-CCNC: 60 U/L — SIGNIFICANT CHANGE UP (ref 40–120)
ALT FLD-CCNC: 13 U/L DA — SIGNIFICANT CHANGE UP (ref 10–60)
ALT FLD-CCNC: 13 U/L DA — SIGNIFICANT CHANGE UP (ref 10–60)
ANION GAP SERPL CALC-SCNC: 10 MMOL/L — SIGNIFICANT CHANGE UP (ref 5–17)
ANION GAP SERPL CALC-SCNC: 10 MMOL/L — SIGNIFICANT CHANGE UP (ref 5–17)
AST SERPL-CCNC: 18 U/L — SIGNIFICANT CHANGE UP (ref 10–40)
AST SERPL-CCNC: 18 U/L — SIGNIFICANT CHANGE UP (ref 10–40)
BASOPHILS # BLD AUTO: 0.01 K/UL — SIGNIFICANT CHANGE UP (ref 0–0.2)
BASOPHILS # BLD AUTO: 0.01 K/UL — SIGNIFICANT CHANGE UP (ref 0–0.2)
BASOPHILS NFR BLD AUTO: 0.2 % — SIGNIFICANT CHANGE UP (ref 0–2)
BASOPHILS NFR BLD AUTO: 0.2 % — SIGNIFICANT CHANGE UP (ref 0–2)
BILIRUB SERPL-MCNC: 0.2 MG/DL — SIGNIFICANT CHANGE UP (ref 0.2–1.2)
BILIRUB SERPL-MCNC: 0.2 MG/DL — SIGNIFICANT CHANGE UP (ref 0.2–1.2)
BUN SERPL-MCNC: 42 MG/DL — HIGH (ref 7–18)
BUN SERPL-MCNC: 42 MG/DL — HIGH (ref 7–18)
CALCIUM SERPL-MCNC: 8.8 MG/DL — SIGNIFICANT CHANGE UP (ref 8.4–10.5)
CALCIUM SERPL-MCNC: 8.8 MG/DL — SIGNIFICANT CHANGE UP (ref 8.4–10.5)
CHLORIDE SERPL-SCNC: 100 MMOL/L — SIGNIFICANT CHANGE UP (ref 96–108)
CHLORIDE SERPL-SCNC: 100 MMOL/L — SIGNIFICANT CHANGE UP (ref 96–108)
CK MB BLD-MCNC: <3.7 % — HIGH (ref 0–3.5)
CK MB BLD-MCNC: <3.7 % — HIGH (ref 0–3.5)
CK MB CFR SERPL CALC: <1 NG/ML — SIGNIFICANT CHANGE UP (ref 0–3.6)
CK MB CFR SERPL CALC: <1 NG/ML — SIGNIFICANT CHANGE UP (ref 0–3.6)
CK SERPL-CCNC: 27 U/L — SIGNIFICANT CHANGE UP (ref 21–215)
CK SERPL-CCNC: 27 U/L — SIGNIFICANT CHANGE UP (ref 21–215)
CO2 SERPL-SCNC: 26 MMOL/L — SIGNIFICANT CHANGE UP (ref 22–31)
CO2 SERPL-SCNC: 26 MMOL/L — SIGNIFICANT CHANGE UP (ref 22–31)
CREAT SERPL-MCNC: 7.23 MG/DL — HIGH (ref 0.5–1.3)
CREAT SERPL-MCNC: 7.23 MG/DL — HIGH (ref 0.5–1.3)
EGFR: 6 ML/MIN/1.73M2 — LOW
EGFR: 6 ML/MIN/1.73M2 — LOW
EOSINOPHIL # BLD AUTO: 0.19 K/UL — SIGNIFICANT CHANGE UP (ref 0–0.5)
EOSINOPHIL # BLD AUTO: 0.19 K/UL — SIGNIFICANT CHANGE UP (ref 0–0.5)
EOSINOPHIL NFR BLD AUTO: 3.5 % — SIGNIFICANT CHANGE UP (ref 0–6)
EOSINOPHIL NFR BLD AUTO: 3.5 % — SIGNIFICANT CHANGE UP (ref 0–6)
GLUCOSE BLDC GLUCOMTR-MCNC: 138 MG/DL — HIGH (ref 70–99)
GLUCOSE BLDC GLUCOMTR-MCNC: 138 MG/DL — HIGH (ref 70–99)
GLUCOSE BLDC GLUCOMTR-MCNC: 186 MG/DL — HIGH (ref 70–99)
GLUCOSE BLDC GLUCOMTR-MCNC: 186 MG/DL — HIGH (ref 70–99)
GLUCOSE SERPL-MCNC: 117 MG/DL — HIGH (ref 70–99)
GLUCOSE SERPL-MCNC: 117 MG/DL — HIGH (ref 70–99)
HCT VFR BLD CALC: 34.8 % — SIGNIFICANT CHANGE UP (ref 34.5–45)
HCT VFR BLD CALC: 34.8 % — SIGNIFICANT CHANGE UP (ref 34.5–45)
HGB BLD-MCNC: 10.9 G/DL — LOW (ref 11.5–15.5)
HGB BLD-MCNC: 10.9 G/DL — LOW (ref 11.5–15.5)
IMM GRANULOCYTES NFR BLD AUTO: 0.6 % — SIGNIFICANT CHANGE UP (ref 0–0.9)
IMM GRANULOCYTES NFR BLD AUTO: 0.6 % — SIGNIFICANT CHANGE UP (ref 0–0.9)
LYMPHOCYTES # BLD AUTO: 1.05 K/UL — SIGNIFICANT CHANGE UP (ref 1–3.3)
LYMPHOCYTES # BLD AUTO: 1.05 K/UL — SIGNIFICANT CHANGE UP (ref 1–3.3)
LYMPHOCYTES # BLD AUTO: 19.3 % — SIGNIFICANT CHANGE UP (ref 13–44)
LYMPHOCYTES # BLD AUTO: 19.3 % — SIGNIFICANT CHANGE UP (ref 13–44)
MAGNESIUM SERPL-MCNC: 2.8 MG/DL — HIGH (ref 1.6–2.6)
MAGNESIUM SERPL-MCNC: 2.8 MG/DL — HIGH (ref 1.6–2.6)
MCHC RBC-ENTMCNC: 27.9 PG — SIGNIFICANT CHANGE UP (ref 27–34)
MCHC RBC-ENTMCNC: 27.9 PG — SIGNIFICANT CHANGE UP (ref 27–34)
MCHC RBC-ENTMCNC: 31.3 GM/DL — LOW (ref 32–36)
MCHC RBC-ENTMCNC: 31.3 GM/DL — LOW (ref 32–36)
MCV RBC AUTO: 89 FL — SIGNIFICANT CHANGE UP (ref 80–100)
MCV RBC AUTO: 89 FL — SIGNIFICANT CHANGE UP (ref 80–100)
MONOCYTES # BLD AUTO: 0.33 K/UL — SIGNIFICANT CHANGE UP (ref 0–0.9)
MONOCYTES # BLD AUTO: 0.33 K/UL — SIGNIFICANT CHANGE UP (ref 0–0.9)
MONOCYTES NFR BLD AUTO: 6.1 % — SIGNIFICANT CHANGE UP (ref 2–14)
MONOCYTES NFR BLD AUTO: 6.1 % — SIGNIFICANT CHANGE UP (ref 2–14)
NEUTROPHILS # BLD AUTO: 3.84 K/UL — SIGNIFICANT CHANGE UP (ref 1.8–7.4)
NEUTROPHILS # BLD AUTO: 3.84 K/UL — SIGNIFICANT CHANGE UP (ref 1.8–7.4)
NEUTROPHILS NFR BLD AUTO: 70.3 % — SIGNIFICANT CHANGE UP (ref 43–77)
NEUTROPHILS NFR BLD AUTO: 70.3 % — SIGNIFICANT CHANGE UP (ref 43–77)
NRBC # BLD: 0 /100 WBCS — SIGNIFICANT CHANGE UP (ref 0–0)
NRBC # BLD: 0 /100 WBCS — SIGNIFICANT CHANGE UP (ref 0–0)
NT-PROBNP SERPL-SCNC: HIGH PG/ML (ref 0–125)
NT-PROBNP SERPL-SCNC: HIGH PG/ML (ref 0–125)
PHOSPHATE SERPL-MCNC: 6.1 MG/DL — HIGH (ref 2.5–4.5)
PHOSPHATE SERPL-MCNC: 6.1 MG/DL — HIGH (ref 2.5–4.5)
PLATELET # BLD AUTO: 226 K/UL — SIGNIFICANT CHANGE UP (ref 150–400)
PLATELET # BLD AUTO: 226 K/UL — SIGNIFICANT CHANGE UP (ref 150–400)
POTASSIUM SERPL-MCNC: 3.8 MMOL/L — SIGNIFICANT CHANGE UP (ref 3.5–5.3)
POTASSIUM SERPL-MCNC: 3.8 MMOL/L — SIGNIFICANT CHANGE UP (ref 3.5–5.3)
POTASSIUM SERPL-SCNC: 3.8 MMOL/L — SIGNIFICANT CHANGE UP (ref 3.5–5.3)
POTASSIUM SERPL-SCNC: 3.8 MMOL/L — SIGNIFICANT CHANGE UP (ref 3.5–5.3)
PROT SERPL-MCNC: 6.3 G/DL — SIGNIFICANT CHANGE UP (ref 6–8.3)
PROT SERPL-MCNC: 6.3 G/DL — SIGNIFICANT CHANGE UP (ref 6–8.3)
RBC # BLD: 3.91 M/UL — SIGNIFICANT CHANGE UP (ref 3.8–5.2)
RBC # BLD: 3.91 M/UL — SIGNIFICANT CHANGE UP (ref 3.8–5.2)
RBC # FLD: 16.3 % — HIGH (ref 10.3–14.5)
RBC # FLD: 16.3 % — HIGH (ref 10.3–14.5)
SODIUM SERPL-SCNC: 136 MMOL/L — SIGNIFICANT CHANGE UP (ref 135–145)
SODIUM SERPL-SCNC: 136 MMOL/L — SIGNIFICANT CHANGE UP (ref 135–145)
TROPONIN I, HIGH SENSITIVITY RESULT: 32.9 NG/L — SIGNIFICANT CHANGE UP
TROPONIN I, HIGH SENSITIVITY RESULT: 32.9 NG/L — SIGNIFICANT CHANGE UP
WBC # BLD: 5.45 K/UL — SIGNIFICANT CHANGE UP (ref 3.8–10.5)
WBC # BLD: 5.45 K/UL — SIGNIFICANT CHANGE UP (ref 3.8–10.5)
WBC # FLD AUTO: 5.45 K/UL — SIGNIFICANT CHANGE UP (ref 3.8–10.5)
WBC # FLD AUTO: 5.45 K/UL — SIGNIFICANT CHANGE UP (ref 3.8–10.5)

## 2024-01-07 PROCEDURE — 96374 THER/PROPH/DIAG INJ IV PUSH: CPT

## 2024-01-07 PROCEDURE — 99261: CPT

## 2024-01-07 PROCEDURE — 83880 ASSAY OF NATRIURETIC PEPTIDE: CPT

## 2024-01-07 PROCEDURE — 99285 EMERGENCY DEPT VISIT HI MDM: CPT | Mod: 25

## 2024-01-07 PROCEDURE — 80053 COMPREHEN METABOLIC PANEL: CPT

## 2024-01-07 PROCEDURE — 96372 THER/PROPH/DIAG INJ SC/IM: CPT

## 2024-01-07 PROCEDURE — 83605 ASSAY OF LACTIC ACID: CPT

## 2024-01-07 PROCEDURE — 82553 CREATINE MB FRACTION: CPT

## 2024-01-07 PROCEDURE — G0378: CPT

## 2024-01-07 PROCEDURE — 0241U: CPT

## 2024-01-07 PROCEDURE — 84484 ASSAY OF TROPONIN QUANT: CPT

## 2024-01-07 PROCEDURE — 83735 ASSAY OF MAGNESIUM: CPT

## 2024-01-07 PROCEDURE — 87637 SARSCOV2&INF A&B&RSV AMP PRB: CPT

## 2024-01-07 PROCEDURE — 71045 X-RAY EXAM CHEST 1 VIEW: CPT

## 2024-01-07 PROCEDURE — 82550 ASSAY OF CK (CPK): CPT

## 2024-01-07 PROCEDURE — 84100 ASSAY OF PHOSPHORUS: CPT

## 2024-01-07 PROCEDURE — 82962 GLUCOSE BLOOD TEST: CPT

## 2024-01-07 PROCEDURE — 0225U NFCT DS DNA&RNA 21 SARSCOV2: CPT

## 2024-01-07 PROCEDURE — 93005 ELECTROCARDIOGRAM TRACING: CPT

## 2024-01-07 PROCEDURE — 85025 COMPLETE CBC W/AUTO DIFF WBC: CPT

## 2024-01-07 PROCEDURE — 87040 BLOOD CULTURE FOR BACTERIA: CPT

## 2024-01-07 PROCEDURE — 36415 COLL VENOUS BLD VENIPUNCTURE: CPT

## 2024-01-07 RX ORDER — ERGOCALCIFEROL 1.25 MG/1
1 CAPSULE ORAL
Qty: 0 | Refills: 0 | DISCHARGE
Start: 2024-01-07

## 2024-01-07 RX ORDER — LACTOBACILLUS ACIDOPHILUS 100MM CELL
1 CAPSULE ORAL
Qty: 0 | Refills: 0 | DISCHARGE
Start: 2024-01-07

## 2024-01-07 RX ORDER — HYDRALAZINE HCL 50 MG
1 TABLET ORAL
Qty: 0 | Refills: 0 | DISCHARGE
Start: 2024-01-07

## 2024-01-07 RX ORDER — GABAPENTIN 400 MG/1
1 CAPSULE ORAL
Qty: 0 | Refills: 0 | DISCHARGE
Start: 2024-01-07

## 2024-01-07 RX ADMIN — SEVELAMER CARBONATE 1600 MILLIGRAM(S): 2400 POWDER, FOR SUSPENSION ORAL at 08:34

## 2024-01-07 RX ADMIN — Medication 60 MILLIGRAM(S): at 06:06

## 2024-01-07 RX ADMIN — Medication 50 MILLIGRAM(S): at 05:51

## 2024-01-07 RX ADMIN — Medication 5 UNIT(S): at 12:40

## 2024-01-07 RX ADMIN — GABAPENTIN 300 MILLIGRAM(S): 400 CAPSULE ORAL at 05:51

## 2024-01-07 RX ADMIN — Medication 1: at 12:40

## 2024-01-07 RX ADMIN — Medication 25 MILLIGRAM(S): at 05:51

## 2024-01-07 RX ADMIN — Medication 50 MILLIGRAM(S): at 15:10

## 2024-01-07 RX ADMIN — Medication 5 UNIT(S): at 08:32

## 2024-01-07 RX ADMIN — SEVELAMER CARBONATE 1600 MILLIGRAM(S): 2400 POWDER, FOR SUSPENSION ORAL at 12:41

## 2024-01-07 RX ADMIN — Medication 1 TABLET(S): at 15:11

## 2024-01-07 RX ADMIN — HEPARIN SODIUM 5000 UNIT(S): 5000 INJECTION INTRAVENOUS; SUBCUTANEOUS at 05:51

## 2024-01-07 RX ADMIN — Medication 325 MILLIGRAM(S): at 12:41

## 2024-01-07 RX ADMIN — Medication 10 MILLIGRAM(S): at 05:51

## 2024-01-07 NOTE — DISCHARGE NOTE PROVIDER - NSDCMRMEDTOKEN_GEN_ALL_CORE_FT
albuterol 90 mcg/inh inhalation aerosol: 2 puff(s) inhaled every 4 hours, As Needed  epoetin suzette: 4,000 unit(s) injectable 3 times a week Administer with HD  ergocalciferol 50 mcg (2000 intl units) oral capsule: 1 cap(s) orally every 7 days  ferrous sulfate 325 mg (65 mg elemental iron) oral delayed release tablet: 1 tab(s) orally once a day  gabapentin 300 mg oral capsule: 1 cap(s) orally 2 times a day  hydrALAZINE 50 mg oral tablet: 1 tab(s) orally 3 times a day  lactobacillus acidophilus oral capsule: 1 cap(s) orally once a day  metOLazone 5 mg oral tablet: 1 tab(s) orally 2 times a day  metoprolol succinate 25 mg oral tablet, extended release: 1 tab(s) orally once a day  NIFEdipine 60 mg oral tablet, extended release: 1 tab(s) orally once a day  predniSONE 10 mg oral tablet: 1 tab(s) orally once a day  Renvela 800 mg oral tablet: 2 tab(s) orally 3 times a day  rosuvastatin 40 mg oral tablet: 1 tab(s) orally once a day  traZODone 50 mg oral tablet: 1 tab(s) orally once a day (at bedtime)  Trulicity Pen 0.75 mg/0.5 mL subcutaneous solution: 0.75 milligram(s) subcutaneously once a week

## 2024-01-07 NOTE — DISCHARGE NOTE NURSING/CASE MANAGEMENT/SOCIAL WORK - NSDCPEFALRISK_GEN_ALL_CORE
For information on Fall & Injury Prevention, visit: [email or web address] OR  [email or web address] OR  https://www.cdc.gov/steadi/patient.html For information on Fall & Injury Prevention, visit: [email or web address] OR  [email or web address] OR  https://www.cdc.gov/steadi/patient.html

## 2024-01-07 NOTE — PROGRESS NOTE ADULT - PROBLEM SELECTOR PLAN 5
- Patient on trulicity at home  - Start patient on Insulin sliding scale ACHS  - Start patient on Finger sticks ACHS  - Start lantus 10u, lispro 5u, can gradually increase to prior insulin dosing (lanuts 14, lispro 7 TID)   - Start Diabetic Diet.
- Patient on trulicity at home  - Start patient on Insulin sliding scale ACHS  - Start patient on Finger sticks ACHS  - Start lantus 10u, lispro 5u, can gradually increase to prior insulin dosing (lanuts 14, lispro 7 TID)   - Start Diabetic Diet.

## 2024-01-07 NOTE — DISCHARGE NOTE PROVIDER - NSDCCPCAREPLAN_GEN_ALL_CORE_FT
PRINCIPAL DISCHARGE DIAGNOSIS  Diagnosis: Hyperkalemia  Assessment and Plan of Treatment: You presented after missed dialysis due to fever , cough, shortness of breath due to flu like symptoms.   You were found to have elevated potassium level.   Normalized after urgent dialysis .   Avoid concentrated potassium .   Continue with dialysis as per your nephrologist.   Follow-up with your primary care physician within 1 week. Call for appointment.        SECONDARY DISCHARGE DIAGNOSES  Diagnosis: Enterovirus infection  Assessment and Plan of Treatment: You presented after missed dialysis due to fever , cough, shortness of breath due to flu like symptoms.   You were found to have Rhinovirus.   Continue supportive measures.   Maintain adequate hydration, nutrition.   Activity as tolerated.   Cover your cough.   Maintain good hand hygiene.   Follow-up with your primary care physician within 1 week. Call for appointment.      Diagnosis: ESRD on dialysis  Assessment and Plan of Treatment: Continue with your dialysis treatments. Follow with your nephrologist (kidney physician). Continue your medications as prescribed.      Diagnosis: Sarcoidosis  Assessment and Plan of Treatment: Continue prednisone as prescribed by your doctor.   Follow-up with your primary care physician within 1 week. Call for appointment.      Diagnosis: Hypertension with fluid overload  Assessment and Plan of Treatment:     Diagnosis: Hypertension  Assessment and Plan of Treatment: Low salt diet  Activity as tolerated.  Take all medication as prescribed.  Follow up with your medical doctor for routine blood pressure monitoring at your next visit.  Notify your doctor if you have any of the following symptoms:   Dizziness, Lightheadedness, Blurry vision, Headache, Chest pain, Shortness of breath      Diagnosis: Diabetes  Assessment and Plan of Treatment: Continue your medications as prescribed by your doctor.   Continue glucose monitoring as instructed by your doctor.   Follow-up with your primary care physician within 1 week. Call for appointment.      Diagnosis: Hyperlipidemia  Assessment and Plan of Treatment: Continue with your cholesterol medications. Eat a heart healthy diet that is low in saturated fats and salt, and includes whole grains, fruits, vegetables and lean protein; exercise regularly (consult with your physician or cardiologist first); maintain a heart healthy weight; if you smoke - quit (A resource to help you stop smoking is the [facility] Tobacco Control – phone number [phone].). Continue to follow with your primary physician or cardiologist.      Diagnosis: Fluid overload  Assessment and Plan of Treatment:      PRINCIPAL DISCHARGE DIAGNOSIS  Diagnosis: Hyperkalemia  Assessment and Plan of Treatment: You presented after missed dialysis due to fever , cough, shortness of breath due to flu like symptoms.   You were found to have elevated potassium level.   Normalized after urgent dialysis .   Avoid concentrated potassium .   Continue with dialysis as per your nephrologist.   Follow-up with your primary care physician within 1 week. Call for appointment.        SECONDARY DISCHARGE DIAGNOSES  Diagnosis: Enterovirus infection  Assessment and Plan of Treatment: You presented after missed dialysis due to fever , cough, shortness of breath due to flu like symptoms.   You were found to have Rhinovirus.   Continue supportive measures.   Maintain adequate hydration, nutrition.   Activity as tolerated.   Cover your cough.   Maintain good hand hygiene.   Follow-up with your primary care physician within 1 week. Call for appointment.      Diagnosis: ESRD on dialysis  Assessment and Plan of Treatment: Continue with your dialysis treatments. Follow with your nephrologist (kidney physician). Continue your medications as prescribed.      Diagnosis: Sarcoidosis  Assessment and Plan of Treatment: Continue prednisone as prescribed by your doctor.   Follow-up with your primary care physician within 1 week. Call for appointment.      Diagnosis: Hypertension with fluid overload  Assessment and Plan of Treatment:     Diagnosis: Hypertension  Assessment and Plan of Treatment: Low salt diet  Activity as tolerated.  Take all medication as prescribed.  Follow up with your medical doctor for routine blood pressure monitoring at your next visit.  Notify your doctor if you have any of the following symptoms:   Dizziness, Lightheadedness, Blurry vision, Headache, Chest pain, Shortness of breath      Diagnosis: Diabetes  Assessment and Plan of Treatment: Continue your medications as prescribed by your doctor.   Continue glucose monitoring as instructed by your doctor.   Follow-up with your primary care physician within 1 week. Call for appointment.      Diagnosis: Hyperlipidemia  Assessment and Plan of Treatment: Continue with your cholesterol medications. Eat a heart healthy diet that is low in saturated fats and salt, and includes whole grains, fruits, vegetables and lean protein; exercise regularly (consult with your physician or cardiologist first); maintain a heart healthy weight; if you smoke - quit (A resource to help you stop smoking is the [facility] Tobacco Control – phone number [phone].). Continue to follow with your primary physician or cardiologist.      Diagnosis: Fluid overload  Assessment and Plan of Treatment:

## 2024-01-07 NOTE — PROGRESS NOTE ADULT - PROBLEM SELECTOR PLAN 3
- pt on Nifedipine 60mg and  hydralazine 50mg TID as per last admission   - c/w home regimen
- pt on Nifedipine 60mg and  hydralazine 50mg TID as per last admission   - c/w home regimen

## 2024-01-07 NOTE — DISCHARGE NOTE PROVIDER - HOSPITAL COURSE
Patient is a 53 year old female from home, ambulates independently, with PMH of HTN, DM2, ESRD on HD M/W/F, sarcoidosis, anemia, seizure disorder (no longer on meds) who presented to the ED because she was sent in from dialysis center after not being allowed to get dialysis secondary to a fever, cough, sob x1d consistent w/ flu like symptoms, showing signs of fluid overload on exam.  Vitals sig for /85. EKG- NS, non-peaked T waves. Labs sig for K 6.2, BUN/Cr 60/9.85 (baseline 6-7). CXR blunting of left costophrenic angle. s/p Lokelma and calcium gluconate. Admitted for hyperkalemia, and fluid overload in the setting of ESRD undergoing HD w/ hyperfiltration today. S/P HD, hypoxia improved,   Patient is positive for Rhinovirus and rapid test. Stable afebrile, HD AM then discharge home.         Problem/Plan - 1:  ·  Problem: Hyperkalemia.   ·  Plan: - sent in from dialysis center after not being allowed to get dialysis secondary to a fever, cough, sob x1d consistent w/ flu like symptoms, showing signs of fluid overload on exam.    - Vitals sig for /85.   - EKG- NS, non-peaked T waves.   - Labs sig for K 6.2, BUN/Cr 60/9.85 (baseline 6-7).   - s/p Lokelma and calcium gluconate.   - For HD w/ hyperfiltration today.   - s/p HD labs noted    - Dr. Estrada for Nephro.    Problem/Plan - 2:  ·  Problem: ESRD on dialysis.   ·  Plan: - ESRD on HD MWF  - CXR w/ blunting of the left costophrenic angle, increased lower extremity edema    - plan as above.    Problem/Plan - 3:  ·  Problem: Hypertension.   ·  Plan: - pt on Nifedipine 60mg and  hydralazine 50mg TID as per last admission   - c/w home regimen.    Problem/Plan - 4:  ·  Problem: Hyperlipidemia.   ·  Plan: - c/w statin.    Problem/Plan - 5:  ·  Problem: Diabetes.   ·  Plan: - Patient on trulicity at home  - Start patient on Insulin sliding scale ACHS  - Start patient on Finger sticks ACHS  - Start lantus 10u, lispro 5u, can gradually increase to prior insulin dosing (lanuts 14, lispro 7 TID)   - Start Diabetic Diet.    Problem/Plan - 6:  ·  Problem: Sarcoidosis.   ·  Plan: - History of sarcoidosis on prednisone 10mg qd  - Continue home meds.    Problem/Plan - 7:  ·  Problem: Prophylactic measure.   ·  Plan: - heparin.      Pt is improved. Afebrile > 24 hrs. Potassium normalized. 3.8 today.     Pt is medically optimized for discharge. Discussed with primary attending Dr. Hubbard and Nephrologist Dr. Hirsch who will inform pt's outpt dialysis center to resume her routine session starting tomorrow morning. Continue medications as instructed. Follow-up with PCP.   This is brief summary of patient's hospitalization. Refer to medical record for complete details of hospital course.

## 2024-01-07 NOTE — PROGRESS NOTE ADULT - PROBLEM SELECTOR PLAN 6
- History of sarcoidosis on prednisone 10mg qd  - Continue home meds.
- History of sarcoidosis on prednisone 10mg qd  - Continue home meds.

## 2024-01-07 NOTE — DISCHARGE NOTE PROVIDER - PROVIDER TOKENS
PROVIDER:[TOKEN:[[id number]],FOLLOWUP:[1 week]],FREE:[LAST:[[facility]],PHONE:[(   )    -],FAX:[(   )    -],FOLLOWUP:[1 week],ESTABLISHEDPATIENT:[T]] PROVIDER:[TOKEN:[[id number]],FOLLOWUP:[1 week]],FREE:[LAST:[[facility]],PHONE:[(   )    -],FAX:[(   )    -],FOLLOWUP:[1 week],ESTABLISHEDPATIENT:[T]]

## 2024-01-07 NOTE — PROGRESS NOTE ADULT - PROBLEM SELECTOR PLAN 1
- sent in from dialysis center after not being allowed to get dialysis secondary to a fever, cough, sob x1d consistent w/ flu like symptoms, showing signs of fluid overload on exam.    - Vitals sig for /85.   - EKG- NS, non-peaked T waves.   - Labs sig for K 6.2, BUN/Cr 60/9.85 (baseline 6-7).   - s/p Lokelma and calcium gluconate.   - For HD w/ hyperfiltration today.   - s/p HD labs noted    - Dr. Estrada for Nephro
- sent in from dialysis center after not being allowed to get dialysis secondary to a fever, cough, sob x1d consistent w/ flu like symptoms, showing signs of fluid overload on exam.    - Vitals sig for /85.   - EKG- NS, non-peaked T waves.   - Labs sig for K 6.2, BUN/Cr 60/9.85 (baseline 6-7).   - s/p Lokelma and calcium gluconate.   - For HD w/ hyperfiltration today.   - s/p HD labs noted    - Dr. Estrada for Nephro

## 2024-01-07 NOTE — CHART NOTE - NSCHARTNOTEFT_GEN_A_CORE
met with patient at the bedside.  Patient shared that she was ready to go home and did not need assistance with transportation as both her daughter and son were home and willing to take her.  Patient shared that she has standing dialysis appointments and had spoke to the dialysis center herself to confirm that she would be seen.  Patient is aware that her discharge papers are being prepared and that the nurse will be removing the IV prior to discharge.  She informed that her son was made aware of her readiness for discharge and was on his way to pick her up,  Patient is socially cleared for discharge when medically cleared

## 2024-01-07 NOTE — DISCHARGE NOTE PROVIDER - NSDCFUSCHEDAPPT_GEN_ALL_CORE_FT
[facility]  INTMED  [facility]   Scheduled Appointment: 01/18/2024     [facility]  INTMED  [facility]   Scheduled Appointment: 01/18/2024

## 2024-01-07 NOTE — DISCHARGE NOTE NURSING/CASE MANAGEMENT/SOCIAL WORK - PATIENT PORTAL LINK FT
You can access the FollowMyHealth Patient Portal offered by [facility] by registering at the following website: [email or web address]. By joining [employer]’s FollowMyHealth portal, you will also be able to view your health information using other applications (apps) compatible with our system. You can access the FollowMyHealth Patient Portal offered by [facility] by registering at the following website: [email or web address]. By joining [employer]’s FollowMyHealth portal, you will also be able to view your health information using other applications (apps) compatible with our system.

## 2024-01-07 NOTE — DISCHARGE NOTE PROVIDER - CARE PROVIDER_API CALL
Jona Hirsch  Nephrology  [address] [place] [id number]  Phone: [phone]  Fax: [phone]  Follow Up Time: 1 week    [facility] includes the [facility],   Phone: (   )    -  Fax: (   )    -  Established Patient  Follow Up Time: 1 week   Jona Hirsch  Nephrology  [address] [place] [id number]  Phone: [phone]  Fax: [phone]  Follow Up Time: 1 week    [facility],   Phone: (   )    -  Fax: (   )    -  Established Patient  Follow Up Time: 1 week

## 2024-01-07 NOTE — PROGRESS NOTE ADULT - SUBJECTIVE AND OBJECTIVE BOX
Patient is a 53y old  Female who presents with a chief complaint of hyperkalemia/fever/hypoxia/virus      INTERVAL HPI/OVERNIGHT EVENTS:  T(C): 37 (01-07-24 @ 08:02), Max: 37.5 (01-06-24 @ 15:51)  HR: 77 (01-07-24 @ 08:02) (77 - 99)  BP: 136/96 (01-07-24 @ 08:02) (131/82 - 163/85)  RR: 17 (01-07-24 @ 08:02) (17 - 18)  SpO2: 100% (01-07-24 @ 08:02) (93% - 100%)  Wt(kg): --    LABS:                        10.9   5.45  )-----------( 226      ( 07 Jan 2024 07:40 )             34.8     01-07    136  |  100  |  42<H>  ----------------------------<  117<H>  3.8   |  26  |  7.23<H>    Ca    8.8      07 Jan 2024 07:40  Phos  6.1     01-07  Mg     2.8     01-07    TPro  6.3  /  Alb  2.4<L>  /  TBili  0.2  /  DBili  x   /  AST  18  /  ALT  13  /  AlkPhos  60  01-07      Urinalysis Basic - ( 07 Jan 2024 07:40 )    Color: x / Appearance: x / SG: x / pH: x  Gluc: 117 mg/dL / Ketone: x  / Bili: x / Urobili: x   Blood: x / Protein: x / Nitrite: x   Leuk Esterase: x / RBC: x / WBC x   Sq Epi: x / Non Sq Epi: x / Bacteria: x      CAPILLARY BLOOD GLUCOSE      POCT Blood Glucose.: 138 mg/dL (07 Jan 2024 07:49)  POCT Blood Glucose.: 111 mg/dL (06 Jan 2024 21:15)  POCT Blood Glucose.: 192 mg/dL (06 Jan 2024 16:49)  POCT Blood Glucose.: 225 mg/dL (06 Jan 2024 11:48)        RADIOLOGY & ADDITIONAL TESTS:    Consultant(s) Notes Reviewed:  [x ] YES  [ ] NO    PHYSICAL EXAM:  GENERAL: well built, well nourished  HEAD:  Atraumatic, Normocephalic  EYES: EOMI, PERRLA, conjunctiva and sclera clear  ENT: No tonsillar erythema, exudates, or enlargement; Moist mucous membranes, Good dentition, No lesions  NECK: Supple, No JVD, Normal thyroid, no enlarged nodes  NERVOUS SYSTEM:  Alert & Oriented X3, Good concentration; Motor Strength 5/5 B/L upper and lower extremities; DTRs 2+ intact and symmetric, sensory intact  CHEST/LUNG: B/L good air entry; No rales, rhonchi, or wheezing  HEART: S1S2 normal, no S3, Regular rate and rhythm; No murmurs, rubs, or gallops  ABDOMEN: Soft, Nontender, Nondistended; Bowel sounds present  EXTREMITIES:  2+ Peripheral Pulses, No clubbing, cyanosis, or edema  LYMPH: No lymphadenopathy noted  SKIN: No rashes or lesions    Care Discussed with Consultants/Other Providers [ x] YES  [ ] NO

## 2024-01-07 NOTE — PROGRESS NOTE ADULT - ASSESSMENT
Patient is a 53 year old female from home, ambulates independently, with PMH of HTN, DM2, ESRD on HD M/W/F, sarcoidosis, anemia, seizure disorder (no longer on meds) who presented to the ED because she was sent in from dialysis center after not being allowed to get dialysis secondary to a fever, cough, sob x1d consistent w/ flu like symptoms, showing signs of fluid overload on exam.  Vitals sig for /85. EKG- NS, non-peaked T waves. Labs sig for K 6.2, BUN/Cr 60/9.85 (baseline 6-7). CXR blunting of left costophrenic angle. s/p Lokelma and calcium gluconate. Admitted for hyperkalemia, and fluid overload in the setting of ESRD undergoing HD w/ hyperfiltration today. S/P HD, hypoxia improved,   Patient is positive for Rhinovirus and rapid test. Stable afebrile, HD AM then discharge home.

## 2024-01-10 LAB
CULTURE RESULTS: SIGNIFICANT CHANGE UP
SPECIMEN SOURCE: SIGNIFICANT CHANGE UP

## 2024-01-14 ENCOUNTER — EMERGENCY (EMERGENCY)
Facility: HOSPITAL | Age: 54
LOS: 1 days | Discharge: LEFT BEFORE DISCHARGE | End: 2024-01-14
Attending: STUDENT IN AN ORGANIZED HEALTH CARE EDUCATION/TRAINING PROGRAM
Payer: MEDICARE

## 2024-01-14 VITALS
DIASTOLIC BLOOD PRESSURE: 140 MMHG | SYSTOLIC BLOOD PRESSURE: 233 MMHG | OXYGEN SATURATION: 95 % | HEIGHT: 60 IN | HEART RATE: 111 BPM | TEMPERATURE: 98 F | WEIGHT: 104.94 LBS | RESPIRATION RATE: 18 BRPM

## 2024-01-14 PROCEDURE — L9991: CPT

## 2024-01-14 NOTE — ED ADULT NURSE NOTE - ED_CALLED_NO_RESPONSE_NOTE 3
Spoke to patient. Patient states "I could not wait, will go to another hospital", patient refused to come back. MD Lazarus made aware. Charge nurse Frances dean as well. Instructed patient to return to be seen by provider and risk factors. Patient verbalized understanding of teaching.

## 2024-01-17 ENCOUNTER — NON-APPOINTMENT (OUTPATIENT)
Age: 54
End: 2024-01-17

## 2024-01-17 LAB
ALBUMIN SERPL ELPH-MCNC: 3.8 G/DL
ALBUMIN SERPL ELPH-MCNC: 4 G/DL
ANION GAP SERPL CALC-SCNC: 18 MMOL/L
ANION GAP SERPL CALC-SCNC: 19 MMOL/L
ANION GAP SERPL CALC-SCNC: 20 MMOL/L
ANION GAP SERPL CALC-SCNC: 21 MMOL/L
BASOPHILS # BLD AUTO: 0.04 K/UL
BASOPHILS NFR BLD AUTO: 0.4 %
BUN SERPL-MCNC: 105 MG/DL
BUN SERPL-MCNC: 113 MG/DL
BUN SERPL-MCNC: 117 MG/DL
BUN SERPL-MCNC: 98 MG/DL
CALCIUM SERPL-MCNC: 7.7 MG/DL
CALCIUM SERPL-MCNC: 7.9 MG/DL
CALCIUM SERPL-MCNC: 8 MG/DL
CALCIUM SERPL-MCNC: 8.3 MG/DL
CHLORIDE SERPL-SCNC: 88 MMOL/L
CHLORIDE SERPL-SCNC: 89 MMOL/L
CHLORIDE SERPL-SCNC: 90 MMOL/L
CHLORIDE SERPL-SCNC: 92 MMOL/L
CO2 SERPL-SCNC: 22 MMOL/L
CO2 SERPL-SCNC: 25 MMOL/L
CO2 SERPL-SCNC: 27 MMOL/L
CO2 SERPL-SCNC: 28 MMOL/L
CREAT SERPL-MCNC: 6.87 MG/DL
CREAT SERPL-MCNC: 7.02 MG/DL
CREAT SERPL-MCNC: 7.28 MG/DL
CREAT SERPL-MCNC: 8.84 MG/DL
EGFR: 5 ML/MIN/1.73M2
EGFR: 6 ML/MIN/1.73M2
EGFR: 7 ML/MIN/1.73M2
EGFR: 7 ML/MIN/1.73M2
EOSINOPHIL # BLD AUTO: 0.24 K/UL
EOSINOPHIL NFR BLD AUTO: 2.3 %
GLUCOSE SERPL-MCNC: 247 MG/DL
GLUCOSE SERPL-MCNC: 263 MG/DL
GLUCOSE SERPL-MCNC: 320 MG/DL
GLUCOSE SERPL-MCNC: 394 MG/DL
HCT VFR BLD CALC: 25.5 %
HGB BLD-MCNC: 8.6 G/DL
IMM GRANULOCYTES NFR BLD AUTO: 0.6 %
LYMPHOCYTES # BLD AUTO: 1.27 K/UL
LYMPHOCYTES NFR BLD AUTO: 12 %
MAN DIFF?: NORMAL
MCHC RBC-ENTMCNC: 27.7 PG
MCHC RBC-ENTMCNC: 33.7 GM/DL
MCV RBC AUTO: 82.3 FL
MONOCYTES # BLD AUTO: 0.39 K/UL
MONOCYTES NFR BLD AUTO: 3.7 %
NEUTROPHILS # BLD AUTO: 8.6 K/UL
NEUTROPHILS NFR BLD AUTO: 81 %
PHOSPHATE SERPL-MCNC: 4.1 MG/DL
PHOSPHATE SERPL-MCNC: 5.1 MG/DL
PHOSPHATE SERPL-MCNC: 7.2 MG/DL
PHOSPHATE SERPL-MCNC: 9.8 MG/DL
PLATELET # BLD AUTO: 283 K/UL
POTASSIUM SERPL-SCNC: 4.6 MMOL/L
POTASSIUM SERPL-SCNC: 4.9 MMOL/L
POTASSIUM SERPL-SCNC: 4.9 MMOL/L
POTASSIUM SERPL-SCNC: 5.4 MMOL/L
RBC # BLD: 3.1 M/UL
RBC # FLD: 12.1 %
SODIUM SERPL-SCNC: 131 MMOL/L
SODIUM SERPL-SCNC: 136 MMOL/L
SODIUM SERPL-SCNC: 136 MMOL/L
SODIUM SERPL-SCNC: 139 MMOL/L
WBC # FLD AUTO: 10.6 K/UL

## 2024-01-18 ENCOUNTER — OUTPATIENT (OUTPATIENT)
Dept: OUTPATIENT SERVICES | Facility: HOSPITAL | Age: 54
LOS: 1 days | End: 2024-01-18
Payer: MEDICARE

## 2024-01-18 ENCOUNTER — APPOINTMENT (OUTPATIENT)
Dept: INTERNAL MEDICINE | Facility: CLINIC | Age: 54
End: 2024-01-18
Payer: MEDICARE

## 2024-01-18 DIAGNOSIS — I10 ESSENTIAL (PRIMARY) HYPERTENSION: ICD-10-CM

## 2024-01-18 DIAGNOSIS — R20.0 ANESTHESIA OF SKIN: ICD-10-CM

## 2024-01-18 DIAGNOSIS — M79.661 PAIN IN RIGHT LOWER LEG: ICD-10-CM

## 2024-01-18 DIAGNOSIS — M79.662 PAIN IN RIGHT LOWER LEG: ICD-10-CM

## 2024-01-18 DIAGNOSIS — M79.662 PAIN IN LEFT LOWER LEG: ICD-10-CM

## 2024-01-18 PROCEDURE — G0463: CPT

## 2024-01-18 PROCEDURE — 99442: CPT

## 2024-01-24 ENCOUNTER — NON-APPOINTMENT (OUTPATIENT)
Age: 54
End: 2024-01-24

## 2024-01-29 NOTE — HISTORY OF PRESENT ILLNESS
[Home] : at home, [unfilled] , at the time of the visit. [Medical Office: ([facility])___] : at the medical office located in  [Verbal consent obtained from patient] : the patient, [unfilled] [FreeTextEntry1] : f/u [de-identified] : 53 year old female from home with PMH of HTN, DM2, ESRD on HD M/W/F, sarcoidosis, anemia, seizure disorder (no longer on meds) presented for V-TEB for f/u of leg pains.  #B/l lower extremity pain:  Has b/l LEs (achy, cramping pain - from knee down) worse during HD, no improvement w/ compression stockings; no effect w/ Tylenol or gabapentin. Previously on dilaudid in hospital with relief. With now associated weakness in both legs - needs assistance to walk to the bathroom, unclear if it's from pain or jsut weakness. No incontinence, no perianal numbness/tingling. No trauma to the legs. Per pt, no associated swelling. Hadn't reached out to neurology since last visit. Numbness pain (2/2 diabetic neuropathy) well controlled with gabapentin 600mg BID.

## 2024-01-29 NOTE — REVIEW OF SYSTEMS
[Cough] : cough [Muscle Pain] : muscle pain [Fever] : no fever [Chest Pain] : no chest pain [Shortness Of Breath] : no shortness of breath [Abdominal Pain] : no abdominal pain

## 2024-01-29 NOTE — ASSESSMENT
[FreeTextEntry1] : 53 year old female from home with PMH of HTN, DM2, ESRD on HD M/W/F, sarcoidosis, anemia, seizure disorder (no longer on meds) presented for V-TEB for f/u.  #B/l lower extremity pain: ddx: organic (electrolytes) vs. clot vs. fluid overload vs. neurologic vs. complex pain syndrome - pain control with tramadol 25mg BID  - recommended pt to d/c statin given concern for myopathy for muscle pain  - ultrasound of b/l LE to rule out DVTs given extreme pain - neurology, PT referral given again - f/u CMP,  Mg, Phos, CK, TSH - RTC for physical exam in 1-2 weeks to determine if neurologic deficits  - will reach out to nephrology about fluid removal (pt reticient about contacting them so I will reach out to them: per inpt notes: follows with [facility] - JOSE Chandra / JOSE Vega / LARA Garcia/ JOSE Lewis/ JOSE Gale/ BHARATI Das / DAWSON Estrada / JOSE RAFAEL Hirsch)

## 2024-01-29 NOTE — END OF VISIT
[] : Resident [FreeTextEntry3] : needs in-person visit to eval further [Time Spent: ___ minutes] : I have spent [unfilled] minutes of time on the encounter.

## 2024-02-08 ENCOUNTER — INPATIENT (INPATIENT)
Facility: HOSPITAL | Age: 54
LOS: 1 days | Discharge: HOME | DRG: 871 | End: 2024-02-10
Attending: INTERNAL MEDICINE | Admitting: INTERNAL MEDICINE
Payer: MEDICARE

## 2024-02-08 VITALS
OXYGEN SATURATION: 91 % | TEMPERATURE: 102 F | DIASTOLIC BLOOD PRESSURE: 84 MMHG | WEIGHT: 97 LBS | RESPIRATION RATE: 24 BRPM | SYSTOLIC BLOOD PRESSURE: 138 MMHG | HEART RATE: 115 BPM | HEIGHT: 60 IN

## 2024-02-08 LAB
ALBUMIN SERPL ELPH-MCNC: 2.8 G/DL — LOW (ref 3.5–5)
ALP SERPL-CCNC: 78 U/L — SIGNIFICANT CHANGE UP (ref 40–120)
ALT FLD-CCNC: 55 U/L DA — SIGNIFICANT CHANGE UP (ref 10–60)
ANION GAP SERPL CALC-SCNC: 7 MMOL/L — SIGNIFICANT CHANGE UP (ref 5–17)
APTT BLD: 37.8 SEC — HIGH (ref 24.5–35.6)
AST SERPL-CCNC: 105 U/L — HIGH (ref 10–40)
BASOPHILS # BLD AUTO: 0.02 K/UL — SIGNIFICANT CHANGE UP (ref 0–0.2)
BASOPHILS NFR BLD AUTO: 0.5 % — SIGNIFICANT CHANGE UP (ref 0–2)
BILIRUB SERPL-MCNC: 0.5 MG/DL — SIGNIFICANT CHANGE UP (ref 0.2–1.2)
BUN SERPL-MCNC: 12 MG/DL — SIGNIFICANT CHANGE UP (ref 7–18)
CALCIUM SERPL-MCNC: 8.8 MG/DL — SIGNIFICANT CHANGE UP (ref 8.4–10.5)
CHLORIDE SERPL-SCNC: 97 MMOL/L — SIGNIFICANT CHANGE UP (ref 96–108)
CO2 SERPL-SCNC: 31 MMOL/L — SIGNIFICANT CHANGE UP (ref 22–31)
CREAT SERPL-MCNC: 3.38 MG/DL — HIGH (ref 0.5–1.3)
EGFR: 16 ML/MIN/1.73M2 — LOW
EOSINOPHIL # BLD AUTO: 0.24 K/UL — SIGNIFICANT CHANGE UP (ref 0–0.5)
EOSINOPHIL NFR BLD AUTO: 6.2 % — HIGH (ref 0–6)
GLUCOSE SERPL-MCNC: 94 MG/DL — SIGNIFICANT CHANGE UP (ref 70–99)
HCT VFR BLD CALC: 27.9 % — LOW (ref 34.5–45)
HGB BLD-MCNC: 9.2 G/DL — LOW (ref 11.5–15.5)
IMM GRANULOCYTES NFR BLD AUTO: 0.3 % — SIGNIFICANT CHANGE UP (ref 0–0.9)
INR BLD: 0.95 RATIO — SIGNIFICANT CHANGE UP (ref 0.85–1.18)
LACTATE SERPL-SCNC: 2.1 MMOL/L — HIGH (ref 0.7–2)
LYMPHOCYTES # BLD AUTO: 0.97 K/UL — LOW (ref 1–3.3)
LYMPHOCYTES # BLD AUTO: 24.9 % — SIGNIFICANT CHANGE UP (ref 13–44)
MCHC RBC-ENTMCNC: 28.6 PG — SIGNIFICANT CHANGE UP (ref 27–34)
MCHC RBC-ENTMCNC: 33 GM/DL — SIGNIFICANT CHANGE UP (ref 32–36)
MCV RBC AUTO: 86.6 FL — SIGNIFICANT CHANGE UP (ref 80–100)
MONOCYTES # BLD AUTO: 0.4 K/UL — SIGNIFICANT CHANGE UP (ref 0–0.9)
MONOCYTES NFR BLD AUTO: 10.3 % — SIGNIFICANT CHANGE UP (ref 2–14)
NEUTROPHILS # BLD AUTO: 2.25 K/UL — SIGNIFICANT CHANGE UP (ref 1.8–7.4)
NEUTROPHILS NFR BLD AUTO: 57.8 % — SIGNIFICANT CHANGE UP (ref 43–77)
NRBC # BLD: 0 /100 WBCS — SIGNIFICANT CHANGE UP (ref 0–0)
PLATELET # BLD AUTO: 177 K/UL — SIGNIFICANT CHANGE UP (ref 150–400)
POTASSIUM SERPL-MCNC: 4.5 MMOL/L — SIGNIFICANT CHANGE UP (ref 3.5–5.3)
POTASSIUM SERPL-SCNC: 4.5 MMOL/L — SIGNIFICANT CHANGE UP (ref 3.5–5.3)
PROT SERPL-MCNC: 7.6 G/DL — SIGNIFICANT CHANGE UP (ref 6–8.3)
PROTHROM AB SERPL-ACNC: 10.9 SEC — SIGNIFICANT CHANGE UP (ref 9.5–13)
RBC # BLD: 3.22 M/UL — LOW (ref 3.8–5.2)
RBC # FLD: 15.4 % — HIGH (ref 10.3–14.5)
SODIUM SERPL-SCNC: 135 MMOL/L — SIGNIFICANT CHANGE UP (ref 135–145)
WBC # BLD: 3.89 K/UL — SIGNIFICANT CHANGE UP (ref 3.8–10.5)
WBC # FLD AUTO: 3.89 K/UL — SIGNIFICANT CHANGE UP (ref 3.8–10.5)

## 2024-02-08 PROCEDURE — 71250 CT THORAX DX C-: CPT | Mod: 26,MA

## 2024-02-08 PROCEDURE — 99285 EMERGENCY DEPT VISIT HI MDM: CPT

## 2024-02-08 PROCEDURE — 74176 CT ABD & PELVIS W/O CONTRAST: CPT | Mod: 26,MA

## 2024-02-08 RX ORDER — ACETAMINOPHEN 500 MG
650 TABLET ORAL ONCE
Refills: 0 | Status: COMPLETED | OUTPATIENT
Start: 2024-02-08 | End: 2024-02-08

## 2024-02-08 RX ADMIN — Medication 650 MILLIGRAM(S): at 22:41

## 2024-02-08 NOTE — ED ADULT TRIAGE NOTE - CHIEF COMPLAINT QUOTE
I have diarrhea, feeling weak, body ache x 5 days.  I was admitted in another hospital and signed AMA today because I am not getting better.

## 2024-02-08 NOTE — ED ADULT NURSE NOTE - NS ED NURSE RECORD ANOTHER HT AND WT
Called patient and left  with results.    ----- Message from Gisele Posey DNP, APRN sent at 10/2/2023  1:32 PM EDT -----  Please notify pt EEG showed no signs of epilepsy.   Yes

## 2024-02-09 ENCOUNTER — RX RENEWAL (OUTPATIENT)
Age: 54
End: 2024-02-09

## 2024-02-09 DIAGNOSIS — A41.9 SEPSIS, UNSPECIFIED ORGANISM: ICD-10-CM

## 2024-02-09 DIAGNOSIS — I10 ESSENTIAL (PRIMARY) HYPERTENSION: ICD-10-CM

## 2024-02-09 DIAGNOSIS — E11.9 TYPE 2 DIABETES MELLITUS WITHOUT COMPLICATIONS: ICD-10-CM

## 2024-02-09 DIAGNOSIS — A41.3 SEPSIS DUE TO HEMOPHILUS INFLUENZAE: ICD-10-CM

## 2024-02-09 DIAGNOSIS — J11.1 INFLUENZA DUE TO UNIDENTIFIED INFLUENZA VIRUS WITH OTHER RESPIRATORY MANIFESTATIONS: ICD-10-CM

## 2024-02-09 DIAGNOSIS — R19.7 DIARRHEA, UNSPECIFIED: ICD-10-CM

## 2024-02-09 DIAGNOSIS — Z29.9 ENCOUNTER FOR PROPHYLACTIC MEASURES, UNSPECIFIED: ICD-10-CM

## 2024-02-09 DIAGNOSIS — N18.6 END STAGE RENAL DISEASE: ICD-10-CM

## 2024-02-09 DIAGNOSIS — D86.9 SARCOIDOSIS, UNSPECIFIED: ICD-10-CM

## 2024-02-09 LAB
ALBUMIN SERPL ELPH-MCNC: 2.3 G/DL — LOW (ref 3.5–5)
ALP SERPL-CCNC: 67 U/L — SIGNIFICANT CHANGE UP (ref 40–120)
ALT FLD-CCNC: 36 U/L DA — SIGNIFICANT CHANGE UP (ref 10–60)
ANION GAP SERPL CALC-SCNC: 6 MMOL/L — SIGNIFICANT CHANGE UP (ref 5–17)
AST SERPL-CCNC: 49 U/L — HIGH (ref 10–40)
B PERT DNA SPEC QL NAA+PROBE: SIGNIFICANT CHANGE UP
BASOPHILS # BLD AUTO: 0.01 K/UL — SIGNIFICANT CHANGE UP (ref 0–0.2)
BASOPHILS NFR BLD AUTO: 0.4 % — SIGNIFICANT CHANGE UP (ref 0–2)
BILIRUB SERPL-MCNC: 0.4 MG/DL — SIGNIFICANT CHANGE UP (ref 0.2–1.2)
BUN SERPL-MCNC: 13 MG/DL — SIGNIFICANT CHANGE UP (ref 7–18)
C PNEUM DNA SPEC QL NAA+PROBE: SIGNIFICANT CHANGE UP
CALCIUM SERPL-MCNC: 8 MG/DL — LOW (ref 8.4–10.5)
CALCIUM SERPL-MCNC: 8.4 MG/DL — SIGNIFICANT CHANGE UP (ref 8.4–10.5)
CHLORIDE SERPL-SCNC: 99 MMOL/L — SIGNIFICANT CHANGE UP (ref 96–108)
CO2 SERPL-SCNC: 32 MMOL/L — HIGH (ref 22–31)
CREAT SERPL-MCNC: 4.05 MG/DL — HIGH (ref 0.5–1.3)
EGFR: 13 ML/MIN/1.73M2 — LOW
EOSINOPHIL # BLD AUTO: 0.17 K/UL — SIGNIFICANT CHANGE UP (ref 0–0.5)
EOSINOPHIL NFR BLD AUTO: 6 % — SIGNIFICANT CHANGE UP (ref 0–6)
FLUAV H1 2009 PAND RNA SPEC QL NAA+PROBE: DETECTED
FLUAV H1 RNA SPEC QL NAA+PROBE: SIGNIFICANT CHANGE UP
FLUAV H3 RNA SPEC QL NAA+PROBE: SIGNIFICANT CHANGE UP
FLUAV SUBTYP SPEC NAA+PROBE: DETECTED
FLUBV RNA SPEC QL NAA+PROBE: SIGNIFICANT CHANGE UP
GLUCOSE BLDC GLUCOMTR-MCNC: 104 MG/DL — HIGH (ref 70–99)
GLUCOSE BLDC GLUCOMTR-MCNC: 144 MG/DL — HIGH (ref 70–99)
GLUCOSE BLDC GLUCOMTR-MCNC: 216 MG/DL — HIGH (ref 70–99)
GLUCOSE BLDC GLUCOMTR-MCNC: 255 MG/DL — HIGH (ref 70–99)
GLUCOSE SERPL-MCNC: 154 MG/DL — HIGH (ref 70–99)
HADV DNA SPEC QL NAA+PROBE: SIGNIFICANT CHANGE UP
HCOV PNL SPEC NAA+PROBE: SIGNIFICANT CHANGE UP
HCT VFR BLD CALC: 24.7 % — LOW (ref 34.5–45)
HGB BLD-MCNC: 7.7 G/DL — LOW (ref 11.5–15.5)
HMPV RNA SPEC QL NAA+PROBE: SIGNIFICANT CHANGE UP
HPIV1 RNA SPEC QL NAA+PROBE: SIGNIFICANT CHANGE UP
HPIV2 RNA SPEC QL NAA+PROBE: SIGNIFICANT CHANGE UP
HPIV3 RNA SPEC QL NAA+PROBE: SIGNIFICANT CHANGE UP
HPIV4 RNA SPEC QL NAA+PROBE: SIGNIFICANT CHANGE UP
IMM GRANULOCYTES NFR BLD AUTO: 0.4 % — SIGNIFICANT CHANGE UP (ref 0–0.9)
LACTATE SERPL-SCNC: 0.8 MMOL/L — SIGNIFICANT CHANGE UP (ref 0.7–2)
LYMPHOCYTES # BLD AUTO: 0.85 K/UL — LOW (ref 1–3.3)
LYMPHOCYTES # BLD AUTO: 29.8 % — SIGNIFICANT CHANGE UP (ref 13–44)
MAGNESIUM SERPL-MCNC: 2.1 MG/DL — SIGNIFICANT CHANGE UP (ref 1.6–2.6)
MCHC RBC-ENTMCNC: 28.2 PG — SIGNIFICANT CHANGE UP (ref 27–34)
MCHC RBC-ENTMCNC: 31.2 GM/DL — LOW (ref 32–36)
MCV RBC AUTO: 90.5 FL — SIGNIFICANT CHANGE UP (ref 80–100)
MONOCYTES # BLD AUTO: 0.4 K/UL — SIGNIFICANT CHANGE UP (ref 0–0.9)
MONOCYTES NFR BLD AUTO: 14 % — SIGNIFICANT CHANGE UP (ref 2–14)
NEUTROPHILS # BLD AUTO: 1.41 K/UL — LOW (ref 1.8–7.4)
NEUTROPHILS NFR BLD AUTO: 49.4 % — SIGNIFICANT CHANGE UP (ref 43–77)
NRBC # BLD: 0 /100 WBCS — SIGNIFICANT CHANGE UP (ref 0–0)
PHOSPHATE SERPL-MCNC: 3 MG/DL — SIGNIFICANT CHANGE UP (ref 2.5–4.5)
PHOSPHATE SERPL-MCNC: 3.9 MG/DL — SIGNIFICANT CHANGE UP (ref 2.5–4.5)
PLATELET # BLD AUTO: 118 K/UL — LOW (ref 150–400)
POTASSIUM SERPL-MCNC: 3 MMOL/L — LOW (ref 3.5–5.3)
POTASSIUM SERPL-SCNC: 3 MMOL/L — LOW (ref 3.5–5.3)
PROT SERPL-MCNC: 6.2 G/DL — SIGNIFICANT CHANGE UP (ref 6–8.3)
PTH-INTACT FLD-MCNC: 71 PG/ML — HIGH (ref 15–65)
RAPID RVP RESULT: DETECTED
RBC # BLD: 2.73 M/UL — LOW (ref 3.8–5.2)
RBC # FLD: 15.1 % — HIGH (ref 10.3–14.5)
RV+EV RNA SPEC QL NAA+PROBE: SIGNIFICANT CHANGE UP
SARS-COV-2 RNA SPEC QL NAA+PROBE: SIGNIFICANT CHANGE UP
SODIUM SERPL-SCNC: 137 MMOL/L — SIGNIFICANT CHANGE UP (ref 135–145)
WBC # BLD: 2.85 K/UL — LOW (ref 3.8–10.5)
WBC # FLD AUTO: 2.85 K/UL — LOW (ref 3.8–10.5)

## 2024-02-09 RX ORDER — LOPERAMIDE HCL 2 MG
4 TABLET ORAL ONCE
Refills: 0 | Status: COMPLETED | OUTPATIENT
Start: 2024-02-09 | End: 2024-02-09

## 2024-02-09 RX ORDER — ATORVASTATIN CALCIUM 80 MG/1
80 TABLET, FILM COATED ORAL AT BEDTIME
Refills: 0 | Status: DISCONTINUED | OUTPATIENT
Start: 2024-02-09 | End: 2024-02-10

## 2024-02-09 RX ORDER — GABAPENTIN 400 MG/1
300 CAPSULE ORAL
Refills: 0 | Status: DISCONTINUED | OUTPATIENT
Start: 2024-02-09 | End: 2024-02-10

## 2024-02-09 RX ORDER — ALBUTEROL 90 UG/1
1 AEROSOL, METERED ORAL EVERY 6 HOURS
Refills: 0 | Status: DISCONTINUED | OUTPATIENT
Start: 2024-02-09 | End: 2024-02-10

## 2024-02-09 RX ORDER — ACETAMINOPHEN 500 MG
650 TABLET ORAL ONCE
Refills: 0 | Status: DISCONTINUED | OUTPATIENT
Start: 2024-02-09 | End: 2024-02-09

## 2024-02-09 RX ORDER — HEPARIN SODIUM 5000 [USP'U]/ML
5000 INJECTION INTRAVENOUS; SUBCUTANEOUS EVERY 8 HOURS
Refills: 0 | Status: DISCONTINUED | OUTPATIENT
Start: 2024-02-09 | End: 2024-02-10

## 2024-02-09 RX ORDER — ACETAMINOPHEN 500 MG
650 TABLET ORAL EVERY 6 HOURS
Refills: 0 | Status: DISCONTINUED | OUTPATIENT
Start: 2024-02-09 | End: 2024-02-10

## 2024-02-09 RX ORDER — TRAZODONE HCL 50 MG
50 TABLET ORAL AT BEDTIME
Refills: 0 | Status: DISCONTINUED | OUTPATIENT
Start: 2024-02-09 | End: 2024-02-10

## 2024-02-09 RX ORDER — FERROUS SULFATE 325(65) MG
325 TABLET ORAL DAILY
Refills: 0 | Status: DISCONTINUED | OUTPATIENT
Start: 2024-02-09 | End: 2024-02-10

## 2024-02-09 RX ORDER — POTASSIUM CHLORIDE 20 MEQ
40 PACKET (EA) ORAL ONCE
Refills: 0 | Status: COMPLETED | OUTPATIENT
Start: 2024-02-09 | End: 2024-02-09

## 2024-02-09 RX ORDER — LOPERAMIDE HCL 2 MG
2 TABLET ORAL EVERY 6 HOURS
Refills: 0 | Status: DISCONTINUED | OUTPATIENT
Start: 2024-02-09 | End: 2024-02-10

## 2024-02-09 RX ORDER — SODIUM CHLORIDE 9 MG/ML
250 INJECTION INTRAMUSCULAR; INTRAVENOUS; SUBCUTANEOUS ONCE
Refills: 0 | Status: COMPLETED | OUTPATIENT
Start: 2024-02-09 | End: 2024-02-09

## 2024-02-09 RX ORDER — INSULIN LISPRO 100/ML
VIAL (ML) SUBCUTANEOUS
Refills: 0 | Status: DISCONTINUED | OUTPATIENT
Start: 2024-02-09 | End: 2024-02-10

## 2024-02-09 RX ORDER — INFLUENZA VIRUS VACCINE 15; 15; 15; 15 UG/.5ML; UG/.5ML; UG/.5ML; UG/.5ML
0.5 SUSPENSION INTRAMUSCULAR ONCE
Refills: 0 | Status: DISCONTINUED | OUTPATIENT
Start: 2024-02-09 | End: 2024-02-10

## 2024-02-09 RX ORDER — SEVELAMER CARBONATE 2400 MG/1
1600 POWDER, FOR SUSPENSION ORAL THREE TIMES A DAY
Refills: 0 | Status: DISCONTINUED | OUTPATIENT
Start: 2024-02-09 | End: 2024-02-10

## 2024-02-09 RX ADMIN — Medication 650 MILLIGRAM(S): at 05:25

## 2024-02-09 RX ADMIN — GABAPENTIN 300 MILLIGRAM(S): 400 CAPSULE ORAL at 17:23

## 2024-02-09 RX ADMIN — SEVELAMER CARBONATE 1600 MILLIGRAM(S): 2400 POWDER, FOR SUSPENSION ORAL at 13:41

## 2024-02-09 RX ADMIN — Medication 650 MILLIGRAM(S): at 18:53

## 2024-02-09 RX ADMIN — Medication 40 MILLIEQUIVALENT(S): at 08:52

## 2024-02-09 RX ADMIN — Medication 2: at 17:20

## 2024-02-09 RX ADMIN — ATORVASTATIN CALCIUM 80 MILLIGRAM(S): 80 TABLET, FILM COATED ORAL at 21:51

## 2024-02-09 RX ADMIN — Medication 3: at 12:01

## 2024-02-09 RX ADMIN — ALBUTEROL 1 PUFF(S): 90 AEROSOL, METERED ORAL at 07:00

## 2024-02-09 RX ADMIN — Medication 650 MILLIGRAM(S): at 02:07

## 2024-02-09 RX ADMIN — Medication 4 MILLIGRAM(S): at 18:53

## 2024-02-09 RX ADMIN — Medication 30 MILLIGRAM(S): at 13:41

## 2024-02-09 RX ADMIN — Medication 650 MILLIGRAM(S): at 07:30

## 2024-02-09 RX ADMIN — SEVELAMER CARBONATE 1600 MILLIGRAM(S): 2400 POWDER, FOR SUSPENSION ORAL at 05:25

## 2024-02-09 RX ADMIN — SEVELAMER CARBONATE 1600 MILLIGRAM(S): 2400 POWDER, FOR SUSPENSION ORAL at 21:51

## 2024-02-09 RX ADMIN — SODIUM CHLORIDE 250 MILLILITER(S): 9 INJECTION INTRAMUSCULAR; INTRAVENOUS; SUBCUTANEOUS at 05:24

## 2024-02-09 RX ADMIN — Medication 325 MILLIGRAM(S): at 13:41

## 2024-02-09 RX ADMIN — Medication 650 MILLIGRAM(S): at 20:00

## 2024-02-09 RX ADMIN — Medication 10 MILLIGRAM(S): at 05:26

## 2024-02-09 RX ADMIN — GABAPENTIN 300 MILLIGRAM(S): 400 CAPSULE ORAL at 05:26

## 2024-02-09 RX ADMIN — Medication 50 MILLIGRAM(S): at 21:52

## 2024-02-09 NOTE — H&P ADULT - PROBLEM SELECTOR PLAN 1
p/w fevers, cough, SOB  influenza A (+)   start Tamiflu   lactate 2.1, trend   give 250cc bolus ivf  f/u bcux

## 2024-02-09 NOTE — ED PROVIDER NOTE - OBJECTIVE STATEMENT
53-year-old woman presenting with 1 week of fevers cough diarrhea.  She reports that she has been admitted to [facility] over the last 5 days.  She felt she was not being treated appropriately and no one was telling her why she was having the symptoms or why she was having such severe diarrhea so she signed out AMA and came here.  She was last dialyzed today but normally gets dialysis Monday Wednesday and Friday.  She denies any sick contacts.

## 2024-02-09 NOTE — H&P ADULT - HISTORY OF PRESENT ILLNESS
This is a 53 year old female from home, ambulates independently, with PMH of HTN, DM2, ESRD on HD M/W/F, sarcoidosis, anemia, seizure disorder (no longer on meds) who presented to the ED for shortness of breath, fevers, cough, and  diarrhea. Patient state she has been having shortness of breath, fevers, and a dry cough for one week when she went to [facility] and was admitted  over the last 5 days.  She felt she was not being treated appropriately and no one was telling her why she was having the symptoms or why she was having such severe diarrhea so she signed out AMA and came here.  She states she was on antibiotics there and developed diarrhea on day 3 of admission. She endorses diarrhea to be watery non bloody stools, more than 8 episodes per day. She was last dialyzed today.  She denies any sick contacts, CP, palpitations, orthopnea PND. Patient has a right permacath placed 3 months ago.     In ED  vitals: BP: 138/54, HR: 115, T: 102.3   CT A/P/C: no acute pathology, lactate 2.1

## 2024-02-09 NOTE — CONSULT NOTE ADULT - SUBJECTIVE AND OBJECTIVE BOX
[facility] : Progress Note :: [phone], (office [phone]),   Dr Estrada / Dr Das / Dr Gale / Dr Vega / Dr Debbie FORD / Dr Garcia / Dr Chandra / Dr Jona pal  _____________________________________________________________________________________________  Patient is a 53y Female whom presented to the hospital with shortness of breath and diarrhoea fro a week.  She was admitted in [facility] but signed out AMA yesterday, presented to ED. Admitted for sepsis sec to influenza  and diarrhea.  She has ESRD on HD at [facility] dialysis unit, last HD yesterday at [facility].       PAST MEDICAL & SURGICAL HISTORY:  Sarcoidosis      Diabetes      Hypertension      Hyperlipidemia      Chronic kidney disease (CKD), stage III (moderate)      Seizure disorder      No significant past surgical history        No Known Allergies    Home Medications Reviewed  Hospital Medications:   MEDICATIONS  (STANDING):  atorvastatin 80 milliGRAM(s) Oral at bedtime  ferrous    sulfate 325 milliGRAM(s) Oral daily  gabapentin 300 milliGRAM(s) Oral two times a day  heparin   Injectable 5000 Unit(s) SubCutaneous every 8 hours  influenza   Vaccine 0.5 milliLiter(s) IntraMuscular once  insulin lispro (ADMELOG) corrective regimen sliding scale   SubCutaneous three times a day before meals  NIFEdipine XL 60 milliGRAM(s) Oral daily  oseltamivir 30 milliGRAM(s) Oral daily  predniSONE   Tablet 10 milliGRAM(s) Oral daily  sevelamer carbonate 1600 milliGRAM(s) Oral three times a day  traZODone 50 milliGRAM(s) Oral at bedtime    SOCIAL HISTORY:  Denies ETOh,Smoking,   FAMILY HISTORY:  Family history of diabetes mellitus in first degree relative        VITALS:  T(F): 97.6 (02-10-24 @ 12:42), Max: 101.8 (02-09-24 @ 18:41)  HR: 78 (02-10-24 @ 12:42)  BP: 153/79 (02-10-24 @ 12:42)  RR: 17 (02-10-24 @ 12:42)  SpO2: 100% (02-10-24 @ 12:42)  Wt(kg): --      PHYSICAL EXAM:  Constitutional: NAD  HEENT: anicteric sclera, oropharynx clear.  Neck: No JVD  Respiratory: CTAB, no wheezes, rales or rhonchi  Cardiovascular: S1, S2, RRR  Gastrointestinal: BS+, soft, NT/ND  Extremities: No peripheral edema  Neurological: A/O x 3, no focal deficits  Vascular Access: IJ permacath     LABS:  02-10    138  |  98  |  22<H>  ----------------------------<  162<H>  3.9   |  31  |  5.87<H>    Ca    8.5      10 Feb 2024 09:10  Phos  3.3     02-10  Mg     2.1     02-09    TPro  6.2  /  Alb  2.3<L>  /  TBili  0.4  /  DBili      /  AST  49<H>  /  ALT  36  /  AlkPhos  67  02-09    Creatinine Trend: 5.87 <--, 4.05 <--, 3.38 <--                        8.5    2.80  )-----------( 146      ( 10 Feb 2024 09:10 )             27.2     Urine Studies:  Urinalysis Basic - ( 10 Feb 2024 09:10 )    Color:  / Appearance:  / SG:  / pH:   Gluc: 162 mg/dL / Ketone:   / Bili:  / Urobili:    Blood:  / Protein:  / Nitrite:    Leuk Esterase:  / RBC:  / WBC    Sq Epi:  / Non Sq Epi:  / Bacteria:         RADIOLOGY & ADDITIONAL STUDIES:

## 2024-02-09 NOTE — ED ADULT NURSE REASSESSMENT NOTE - NS ED NURSE REASSESS COMMENT FT1
Received patient at 0700. awake. alert and oriented x3. SPo2: 89% on room air. started on 2L o2 with NC. No diarrhea since last night. Patient had HD yesterday at [facility]. Permacath to [facility]. No distress. No chest pain. Patient admitted and awaiting Bed.

## 2024-02-09 NOTE — H&P ADULT - PROBLEM SELECTOR PLAN 5
on hydralazine 50 mg TID, Metolazone , metoprolol 25mg, nifedipine 60mg  hold in setting of sepsis   resume as needed

## 2024-02-09 NOTE — H&P ADULT - PROBLEM SELECTOR PLAN 3
MWF with Right permacath   no signs of infection   consult nephro Dr. Hirsch JAYDEN with Right permacath   no signs of infection   consult nephro Dr. Estrdaa

## 2024-02-09 NOTE — ED PROVIDER NOTE - CLINICAL SUMMARY MEDICAL DECISION MAKING FREE TEXT BOX
Patient presenting with infectious symptoms and diarrhea for last 5 days and just left the inpatient setting at another hospital.  She does not have the documentation from the prior hospitalization or working diagnosis with her.  Given her history of end-stage renal disease on dialysis I do not believe that IV fluid boluses are necessary at this time.  Will obtain pancultures labs CT chest abdomen pelvis, RVP, GI PCR and C. difficile testing.  Will admit.

## 2024-02-09 NOTE — H&P ADULT - PROBLEM SELECTOR PLAN 2
3 Days after antibiotics   GI PCR, stool culture, ova and parasite, CDiff PCR   isolation precaution

## 2024-02-09 NOTE — CHART NOTE - NSCHARTNOTEFT_GEN_A_CORE
pt is aox3, tolerating po, comfortable appearing, initially found with 2L NC, now weened to room air and tolerating 100% oxygen saturation.   Pt still found to mild fever 100.4 this am due to flu A. Lactate downtrended 0.8.     Pt reports that she was at [facility] and Last received HD on Thursday 2/8.   Discussed with Nephro Dr. Estrada will reschedule HD on Saturday 2/10.     #hypokalemia   K 3.0  will give potassium 40 meq x1 dose for now  caution potassium use in ESRD   monitor BMP, mag serum pt is aox3, tolerating po, comfortable appearing, initially found with 2L NC, now weened to room air and tolerating 100% oxygen saturation.   Pt still found to mild fever 100.4 this am due to flu A. Lactate downtrended 0.8.     Pt reports that she was at [facility] and Last received HD on Thursday 2/8.   Discussed with Nephro Dr. Estrada will reschedule HD on Saturday 2/10.     #hypokalemia   K 3.0  will give potassium 40 meq x1 dose for now  caution potassium use in ESRD   monitor BMP, mag serum    #diarrhea  trial of imodium x2 days

## 2024-02-09 NOTE — H&P ADULT - NSHPPHYSICALEXAM_GEN_ALL_CORE
GENERAL: NAD  HEAD:  Atraumatic, Normocephalic  EYES:  conjunctiva and sclera clear  NECK: Supple, No JVD, Normal thyroid  CHEST/LUNG: Clear to auscultation. No rales, rhonchi, wheezing, or rubs. R permacath(+) no signs of infection   HEART: Regular rate and rhythm; No murmurs, rubs, or gallops  ABDOMEN: Soft, Nontender, Nondistended; Bowel sounds present  NERVOUS SYSTEM:  Alert & Oriented X3,    EXTREMITIES:  2+ Peripheral Pulses, No clubbing, cyanosis, or edema  SKIN: warm dry

## 2024-02-09 NOTE — PATIENT PROFILE ADULT - FALL HARM RISK - HARM RISK INTERVENTIONS

## 2024-02-09 NOTE — H&P ADULT - ASSESSMENT
This is a 53 year old female from home, ambulates independently, with PMH of HTN, DM2, ESRD on HD M/W/F, sarcoidosis, anemia, seizure disorder (no longer on meds) who presented to the ED for shortness of breath, fevers, cough, and  diarrhea. Patient state she has been having shortness of breath, fevers, and a dry cough for one week. Admitted for sepsis 2/2 influenza and diarrhea     In ED  vitals: BP: 138/54, HR: 115, T: 102.3   CT A/P/C: no acute pathology, lactate 2.1

## 2024-02-09 NOTE — ED PROVIDER NOTE - PHYSICAL EXAMINATION
Exam:  General: Patient ill appearing, febrile, tachycardic otherwise vital signs within normal limits  HEENT: airway patent with moist mucous membranes  Cardiac: regular tachycardia S1/S2 with strong peripheral pulses  Respiratory: faint rales at bases  GI: abdomen soft, mild diffuse tenderness, non distended  Neuro: no gross neurologic deficits  Skin: warm, well perfused

## 2024-02-09 NOTE — H&P ADULT - NSHPADDITIONALINFOADULT_GEN_ALL_CORE
Patient was examined and evaluated at bedside. Continue on present treatments.  Discharge planning home if stable.  Dr. Meyer

## 2024-02-09 NOTE — ED ADULT NURSE REASSESSMENT NOTE - NS ED NURSE REASSESS COMMENT FT1
pt assisted with diaper change, no stool to collect at this time. pt provided meal tray. no new complaints/concerns at this time.

## 2024-02-09 NOTE — PATIENT PROFILE ADULT - FALL HARM RISK - CONCLUSION
PROGRESS NOTE



DATE OF SERVICE:

04/14/2020



This is a 62-year-old gentleman who was admitted with fever is being evaluated 
for

possible cholangitis.  The patient also had elevated LFTs, which are improving. 
Liver

ultrasound was done, which showed hepatomegaly and  and abdomen and pelvis CT 
scan

was also done which showed hepatomegaly and 1 cm nonobstructing kidney stone.  
The

patient was closely monitored.  No chest pain or palpitations.  Urine cultures 
show

presumptive Staphylococcus aureus.



PHYSICAL EXAMINATION:

Alert and oriented x3.  Pulse 77, blood pressure 122/70, respirations 18, 
temperature

98.1, pulse ox 96% on room air.

HEENT: Conjunctivae normal.

NECK: No jugular venous distention.

CARDIOVASCULAR SYSTEM:  S1, S2 muffled.

RESPIRATORY SYSTEM: Breath sounds diminished at the bases.  No rhonchi.  No 
crackles.

ABDOMEN: Soft, nontender.  No mass palpable.

LEGS: No edema. No swelling.

NERVOUS SYSTEM: No focal deficit.



LABS:

CBC within normal limits.  Sodium 134, glucose 200, AST is 90, and ALT is 683, 
and

alkaline phosphatase 254, clinical improvement.  Microbiology:  Urine culture 
with

presumptive Staphylococcus aureus.



ASSESSMENT:

1. Fever for evaluation, rule out sepsis, possible cholangitis.

2. Possible urinary tract infection with presumptive Staphylococcus aureus.

3. Increased AST, ALT, and alkaline phosphatase with possible hepatitis, rule 
out

    extrahepatic biliary obstruction.

4. Elevated alkaline phosphatase and LDH.

5. Elevated plasma lactic acid.

6. Hypomagnesemia.

7. Diabetes type 2, uncontrolled, possibly aggravated by steroids.

8. Hyponatremia.

9. Elevated D-dimer without any evidence of pulmonary embolism.

10.History of recently diagnosed CNS lymphoma.

11.Gastroesophageal reflux disease.

12.Hyperlipidemia.

13.History of nicotine dependence.

14.COVID-19 negative.



RECOMMENDATIONS AND DISCUSSION:

I recommend to continue current medications, management, and continue 
symptomatic

treatment.  Continue empiric antibiotics. Blood tests are as mentioned earlier. 
We

will obtain the final ID of the culture from the urine.  Otherwise, repeat labs

including LFTs.  We will closely follow with Hematology/Oncology.  Further

recommendations to follow.  Prognosis guarded due to patient's multiple complex 
medical

issues.





MMODL / IJN: [id number] / Job#: [id number]

MADIE Fall with Harm Risk

## 2024-02-09 NOTE — CONSULT NOTE ADULT - ASSESSMENT
Patient is a 53y Female whom presented to the hospital with shortness of breath and diarrhoea fro a week.  She was admitted in [facility] but signed out AMA yesterday, presented to ED. Admitted for sepsis sec to influenza  and diarrhea.  She has ESRD on HD at [facility] dialysis unit, last HD yesterday at [facility].     # ESRD s/p HD yesterday. plan for HD in am  # ANEMIA OF Chronic Kidney Disease Stage. epogen on HD   # sepsis sec to influenza and diarrhoea per primary team   # CKDMBD. phos at goal

## 2024-02-10 ENCOUNTER — TRANSCRIPTION ENCOUNTER (OUTPATIENT)
Age: 54
End: 2024-02-10

## 2024-02-10 VITALS
TEMPERATURE: 98 F | SYSTOLIC BLOOD PRESSURE: 153 MMHG | OXYGEN SATURATION: 100 % | RESPIRATION RATE: 17 BRPM | HEART RATE: 78 BPM | DIASTOLIC BLOOD PRESSURE: 79 MMHG

## 2024-02-10 LAB
ANION GAP SERPL CALC-SCNC: 9 MMOL/L — SIGNIFICANT CHANGE UP (ref 5–17)
BLD GP AB SCN SERPL QL: SIGNIFICANT CHANGE UP
BUN SERPL-MCNC: 22 MG/DL — HIGH (ref 7–18)
CALCIUM SERPL-MCNC: 8.5 MG/DL — SIGNIFICANT CHANGE UP (ref 8.4–10.5)
CHLORIDE SERPL-SCNC: 98 MMOL/L — SIGNIFICANT CHANGE UP (ref 96–108)
CO2 SERPL-SCNC: 31 MMOL/L — SIGNIFICANT CHANGE UP (ref 22–31)
CREAT SERPL-MCNC: 5.87 MG/DL — HIGH (ref 0.5–1.3)
EGFR: 8 ML/MIN/1.73M2 — LOW
GI PCR PANEL: SIGNIFICANT CHANGE UP
GLUCOSE BLDC GLUCOMTR-MCNC: 120 MG/DL — HIGH (ref 70–99)
GLUCOSE BLDC GLUCOMTR-MCNC: 130 MG/DL — HIGH (ref 70–99)
GLUCOSE BLDC GLUCOMTR-MCNC: 172 MG/DL — HIGH (ref 70–99)
GLUCOSE SERPL-MCNC: 162 MG/DL — HIGH (ref 70–99)
HBV SURFACE AB SER-ACNC: SIGNIFICANT CHANGE UP
HBV SURFACE AG SER-ACNC: SIGNIFICANT CHANGE UP
HCT VFR BLD CALC: 27.2 % — LOW (ref 34.5–45)
HGB BLD-MCNC: 8.5 G/DL — LOW (ref 11.5–15.5)
MCHC RBC-ENTMCNC: 28.1 PG — SIGNIFICANT CHANGE UP (ref 27–34)
MCHC RBC-ENTMCNC: 31.3 GM/DL — LOW (ref 32–36)
MCV RBC AUTO: 90.1 FL — SIGNIFICANT CHANGE UP (ref 80–100)
NRBC # BLD: 0 /100 WBCS — SIGNIFICANT CHANGE UP (ref 0–0)
PHOSPHATE SERPL-MCNC: 3.3 MG/DL — SIGNIFICANT CHANGE UP (ref 2.5–4.5)
PLATELET # BLD AUTO: 146 K/UL — LOW (ref 150–400)
POTASSIUM SERPL-MCNC: 3.9 MMOL/L — SIGNIFICANT CHANGE UP (ref 3.5–5.3)
POTASSIUM SERPL-SCNC: 3.9 MMOL/L — SIGNIFICANT CHANGE UP (ref 3.5–5.3)
RBC # BLD: 3.02 M/UL — LOW (ref 3.8–5.2)
RBC # FLD: 15.1 % — HIGH (ref 10.3–14.5)
SODIUM SERPL-SCNC: 138 MMOL/L — SIGNIFICANT CHANGE UP (ref 135–145)
WBC # BLD: 2.8 K/UL — LOW (ref 3.8–10.5)
WBC # FLD AUTO: 2.8 K/UL — LOW (ref 3.8–10.5)

## 2024-02-10 PROCEDURE — 87046 STOOL CULTR AEROBIC BACT EA: CPT

## 2024-02-10 PROCEDURE — 80053 COMPREHEN METABOLIC PANEL: CPT

## 2024-02-10 PROCEDURE — 87640 STAPH A DNA AMP PROBE: CPT

## 2024-02-10 PROCEDURE — 82962 GLUCOSE BLOOD TEST: CPT

## 2024-02-10 PROCEDURE — 0225U NFCT DS DNA&RNA 21 SARSCOV2: CPT

## 2024-02-10 PROCEDURE — 87507 IADNA-DNA/RNA PROBE TQ 12-25: CPT

## 2024-02-10 PROCEDURE — 82310 ASSAY OF CALCIUM: CPT

## 2024-02-10 PROCEDURE — 85027 COMPLETE CBC AUTOMATED: CPT

## 2024-02-10 PROCEDURE — 85610 PROTHROMBIN TIME: CPT

## 2024-02-10 PROCEDURE — 83605 ASSAY OF LACTIC ACID: CPT

## 2024-02-10 PROCEDURE — 87340 HEPATITIS B SURFACE AG IA: CPT

## 2024-02-10 PROCEDURE — 36415 COLL VENOUS BLD VENIPUNCTURE: CPT

## 2024-02-10 PROCEDURE — 87177 OVA AND PARASITES SMEARS: CPT

## 2024-02-10 PROCEDURE — 86900 BLOOD TYPING SEROLOGIC ABO: CPT

## 2024-02-10 PROCEDURE — 87045 FECES CULTURE AEROBIC BACT: CPT

## 2024-02-10 PROCEDURE — 86706 HEP B SURFACE ANTIBODY: CPT

## 2024-02-10 PROCEDURE — 74176 CT ABD & PELVIS W/O CONTRAST: CPT | Mod: MA

## 2024-02-10 PROCEDURE — 85730 THROMBOPLASTIN TIME PARTIAL: CPT

## 2024-02-10 PROCEDURE — 83735 ASSAY OF MAGNESIUM: CPT

## 2024-02-10 PROCEDURE — 99285 EMERGENCY DEPT VISIT HI MDM: CPT

## 2024-02-10 PROCEDURE — 99261: CPT

## 2024-02-10 PROCEDURE — 86901 BLOOD TYPING SEROLOGIC RH(D): CPT

## 2024-02-10 PROCEDURE — 85025 COMPLETE CBC W/AUTO DIFF WBC: CPT

## 2024-02-10 PROCEDURE — 86850 RBC ANTIBODY SCREEN: CPT

## 2024-02-10 PROCEDURE — 93005 ELECTROCARDIOGRAM TRACING: CPT

## 2024-02-10 PROCEDURE — 83970 ASSAY OF PARATHORMONE: CPT

## 2024-02-10 PROCEDURE — 71250 CT THORAX DX C-: CPT | Mod: MA

## 2024-02-10 PROCEDURE — 84100 ASSAY OF PHOSPHORUS: CPT

## 2024-02-10 PROCEDURE — 94640 AIRWAY INHALATION TREATMENT: CPT

## 2024-02-10 PROCEDURE — 87641 MR-STAPH DNA AMP PROBE: CPT

## 2024-02-10 PROCEDURE — 87040 BLOOD CULTURE FOR BACTERIA: CPT

## 2024-02-10 PROCEDURE — 80048 BASIC METABOLIC PNL TOTAL CA: CPT

## 2024-02-10 RX ORDER — METOLAZONE 5 MG/1
1 TABLET ORAL
Refills: 0 | DISCHARGE

## 2024-02-10 RX ORDER — ALBUTEROL 90 UG/1
1 AEROSOL, METERED ORAL
Qty: 0 | Refills: 0 | DISCHARGE
Start: 2024-02-10

## 2024-02-10 RX ORDER — TRAZODONE HCL 50 MG
1 TABLET ORAL
Refills: 0 | DISCHARGE

## 2024-02-10 RX ORDER — SEVELAMER CARBONATE 2400 MG/1
2 POWDER, FOR SUSPENSION ORAL
Refills: 0 | DISCHARGE

## 2024-02-10 RX ORDER — FERROUS SULFATE 325(65) MG
1 TABLET ORAL
Refills: 0 | DISCHARGE

## 2024-02-10 RX ORDER — ALBUTEROL 90 UG/1
2 AEROSOL, METERED ORAL
Qty: 0 | Refills: 0 | DISCHARGE

## 2024-02-10 RX ORDER — NIFEDIPINE 30 MG
60 TABLET, EXTENDED RELEASE 24 HR ORAL DAILY
Refills: 0 | Status: DISCONTINUED | OUTPATIENT
Start: 2024-02-10 | End: 2024-02-10

## 2024-02-10 RX ORDER — SEVELAMER CARBONATE 2400 MG/1
2 POWDER, FOR SUSPENSION ORAL
Qty: 0 | Refills: 0 | DISCHARGE
Start: 2024-02-10

## 2024-02-10 RX ORDER — AMLODIPINE BESYLATE 2.5 MG/1
10 TABLET ORAL DAILY
Refills: 0 | Status: DISCONTINUED | OUTPATIENT
Start: 2024-02-10 | End: 2024-02-10

## 2024-02-10 RX ORDER — GABAPENTIN 400 MG/1
1 CAPSULE ORAL
Qty: 0 | Refills: 0 | DISCHARGE
Start: 2024-02-10

## 2024-02-10 RX ORDER — FERROUS SULFATE 325(65) MG
1 TABLET ORAL
Qty: 0 | Refills: 0 | DISCHARGE
Start: 2024-02-10

## 2024-02-10 RX ORDER — AMLODIPINE BESYLATE 2.5 MG/1
2.5 TABLET ORAL ONCE
Refills: 0 | Status: COMPLETED | OUTPATIENT
Start: 2024-02-10 | End: 2024-02-10

## 2024-02-10 RX ORDER — NIFEDIPINE 30 MG
1 TABLET, EXTENDED RELEASE 24 HR ORAL
Qty: 0 | Refills: 0 | DISCHARGE
Start: 2024-02-10

## 2024-02-10 RX ORDER — TRAZODONE HCL 50 MG
1 TABLET ORAL
Qty: 0 | Refills: 0 | DISCHARGE
Start: 2024-02-10

## 2024-02-10 RX ADMIN — SEVELAMER CARBONATE 1600 MILLIGRAM(S): 2400 POWDER, FOR SUSPENSION ORAL at 13:00

## 2024-02-10 RX ADMIN — AMLODIPINE BESYLATE 2.5 MILLIGRAM(S): 2.5 TABLET ORAL at 05:55

## 2024-02-10 RX ADMIN — Medication 10 MILLIGRAM(S): at 05:25

## 2024-02-10 RX ADMIN — Medication 1: at 08:10

## 2024-02-10 RX ADMIN — SEVELAMER CARBONATE 1600 MILLIGRAM(S): 2400 POWDER, FOR SUSPENSION ORAL at 05:25

## 2024-02-10 RX ADMIN — Medication 325 MILLIGRAM(S): at 12:54

## 2024-02-10 RX ADMIN — Medication 30 MILLIGRAM(S): at 13:22

## 2024-02-10 RX ADMIN — Medication 60 MILLIGRAM(S): at 15:29

## 2024-02-10 RX ADMIN — GABAPENTIN 300 MILLIGRAM(S): 400 CAPSULE ORAL at 17:16

## 2024-02-10 RX ADMIN — GABAPENTIN 300 MILLIGRAM(S): 400 CAPSULE ORAL at 05:25

## 2024-02-10 NOTE — DISCHARGE NOTE PROVIDER - HOSPITAL COURSE
This is a 53 year old female from home, ambulates independently, with PMH of HTN, DM2, ESRD on HD M/W/F, sarcoidosis, anemia, seizure disorder (no longer on meds) who presented to the ED for shortness of breath, fevers, cough, and  diarrhea. Patient state she has been having shortness of breath, fevers, and a dry cough for one week. Admitted for sepsis 2/2 influenza and diarrhea. Started on Tamiflu and Imodium. Stool cx and GI PCR negative for any infectious processes. Diarrhea is now resolved. Patient is saturating well on room air. No supplemental oxygen is needed at this time. Patient to receive tamiflu x 3 days. Now optimized for discharge.     Given the clinical course, decision was made to discharge pt with outpatient follow up.   Discharge plan discussed with attending   This is only a brief summary for the whole hospital course, please follow up with EMR

## 2024-02-10 NOTE — DISCHARGE NOTE PROVIDER - NSDCCPCAREPLAN_GEN_ALL_CORE_FT
PRINCIPAL DISCHARGE DIAGNOSIS  Diagnosis: Sepsis due to hemophilus influenzae  Assessment and Plan of Treatment: You were found to have the flu during your hospital admission. Influenza is a highly contagious viral infection. It is caused by influenza viruses which are transmitted through respiratory droplets when an infected person coughs, sneezes, or talks. Some of the influenza symptoms include fever, cough, sore throat, body aches, fatigue, headache, and in some casews respiratory distress. Continue with your tamiflu daily for three more days starting on 2/11. Please follow up with your primary care and your nephrologist within one week      SECONDARY DISCHARGE DIAGNOSES  Diagnosis: Gastritis  Assessment and Plan of Treatment: You were found to have acute diaarhea during your hospital stay. Some causes include stress, alcohol infection ot medications such as long term use of NSAIDS( ASPIRIN, IBUPRFEN, AND NAPROXEN). You were treated with Immodium during your hospital stay. GI PCR, stool culture, and cdiff culture negative for any findings. Please follow up with a GI doctor if you continue to have diaarhea or go to your nearest hospital    Diagnosis: ESRD on hemodialysis  Assessment and Plan of Treatment: Continue with your dialysis treatments. Follow with your nephrologist (kidney physician). Continue your medications as prescribed.      Diagnosis: DM (diabetes mellitus)  Assessment and Plan of Treatment: Continue your medications as prescribed by your doctor.   Continue glucose monitoring as instructed by your doctor.   Follow-up with your primary care physician within 1 week. Call for appointment.      Diagnosis: HTN (hypertension)  Assessment and Plan of Treatment: Low salt diet  Activity as tolerated.  Take all medication as prescribed.  Follow up with your medical doctor for routine blood pressure monitoring at your next visit.  Notify your doctor if you have any of the following symptoms:   Dizziness, Lightheadedness, Blurry vision, Headache, Chest pain, Shortness of breath      Diagnosis: Sarcoidosis  Assessment and Plan of Treatment: Please continue with your prednisone medication daily

## 2024-02-10 NOTE — DISCHARGE NOTE PROVIDER - NSDCCAREPROVSEEN_GEN_ALL_CORE_FT
Salud Meyer Continue Regimen: Locoid lotion: apply to affected areas around the eyes only as needed for flares Otc Regimen: Neutrogena hydroboost: apply to face as needed for hydration Samples Given: Epiceram: apply to affected areas around eyes daily \\nFree and clear shampoo and conditioner for daily hair washing Discontinue Regimen: Current hair gel Detail Level: Zone

## 2024-02-10 NOTE — PROGRESS NOTE ADULT - ASSESSMENT
A/P   ESRD ON  HD   WAS  DIALYZED TODAY   AS  SHE   REFUSED  HD  YESTERDAY    HAS NO  VOL  EXCESS    K  OK,  WAS LOW  EARLIER  AND WAS REPLACED  LOW  HB  ON PRESTON       CHR  DIARRHEA   C DIFF  NEGATIVE   STOOL CULTURES PENDING     TREATED  FOR  FLU    WILL  FOLLOW  
This is a 53 year old female from home, ambulates independently, with PMH of HTN, DM2, ESRD on HD M/W/F, sarcoidosis, anemia, seizure disorder (no longer on meds) who presented to the ED for shortness of breath, fevers, cough, and  diarrhea. Patient state she has been having shortness of breath, fevers, and a dry cough for one week. Admitted for sepsis 2/2 influenza and diarrhea

## 2024-02-10 NOTE — DISCHARGE NOTE PROVIDER - CARE PROVIDER_API CALL
[employer]  Phone: (   )    -  Fax: (   )    -  Follow Up Time: 1 week    Balbir Marshall  [address] [facility] [address]  Phone: [phone]  Fax: (   )    -  Follow Up Time: 1 week

## 2024-02-10 NOTE — CHART NOTE - NSCHARTNOTEFT_GEN_A_CORE
BP meds that were taken at [facility] for bp control     #HTN urgency   - hx of missing dialysis   - nifedipine 60 mg daily   - metoprolol succinate ER 25 mg once daily   - hydralazine 50 mg twice daily   - metolazone 5 mg twice daily       #diaarhea   - f/u stool culture   - imodium 2 mg q 6 hours prn for diaarhea

## 2024-02-10 NOTE — DISCHARGE NOTE NURSING/CASE MANAGEMENT/SOCIAL WORK - NSDCPEFALRISK_GEN_ALL_CORE
For information on Fall & Injury Prevention, visit: [email or web address] OR  [email or web address] OR  https://www.cdc.gov/steadi/patient.html

## 2024-02-10 NOTE — DISCHARGE NOTE PROVIDER - NSDCMRMEDTOKEN_GEN_ALL_CORE_FT
albuterol 90 mcg/inh inhalation aerosol: 1 puff(s) inhaled every 6 hours As needed Bronchospasm  ergocalciferol 50 mcg (2000 intl units) oral capsule: 1 cap(s) orally every 7 days  ferrous sulfate 325 mg (65 mg elemental iron) oral tablet: 1 tab(s) orally once a day  gabapentin 300 mg oral capsule: 1 cap(s) orally 2 times a day  hydrALAZINE 50 mg oral tablet: 1 tab(s) orally 3 times a day  lactobacillus acidophilus oral capsule: 1 cap(s) orally once a day  metoprolol succinate 25 mg oral tablet, extended release: 1 tab(s) orally once a day  NIFEdipine 60 mg oral tablet, extended release: 1 tab(s) orally once a day  oseltamivir 30 mg oral capsule: 1 cap(s) orally once a day  predniSONE 10 mg oral tablet: 1 tab(s) orally once a day  rosuvastatin 40 mg oral tablet: 1 tab(s) orally once a day  sevelamer carbonate 800 mg oral tablet: 2 tab(s) orally 3 times a day  traZODone 50 mg oral tablet: 1 tab(s) orally once a day (at bedtime)  Trulicity Pen 0.75 mg/0.5 mL subcutaneous solution: 0.75 milligram(s) subcutaneously once a week

## 2024-02-10 NOTE — DISCHARGE NOTE NURSING/CASE MANAGEMENT/SOCIAL WORK - PATIENT PORTAL LINK FT
You can access the FollowMyHealth Patient Portal offered by [facility] by registering at the following website: [email or web address]. By joining [facility]’s FollowMyHealth portal, you will also be able to view your health information using other applications (apps) compatible with our system.

## 2024-02-10 NOTE — CHART NOTE - NSCHARTNOTEFT_GEN_A_CORE
EVENT:   2/10/24, 5:35am, patient with hx. HTN had BP - 172/82, HR - 86.   HPI:      53 year old female from home, ambulates independently, with PMH of HTN, DM2, ESRD on HD M/W/F, sarcoidosis, anemia, seizure disorder (no longer on meds) who presented to the ED for shortness of breath, fevers, cough, and  diarrhea. Patient state she has been having shortness of breath, fevers, and a dry cough for one week when she went to [facility] and was admitted  over the last 5 days.  She felt she was not being treated appropriately and no one was telling her why she was having the symptoms or why she was having such severe diarrhea so she signed out AMA and came here.  She states she was on antibiotics there and developed diarrhea on day 3 of admission. She endorses diarrhea to be watery non bloody stools, more than 8 episodes per day. She was last dialyzed today.  She denies any sick contacts, CP, palpitations, orthopnea PND. Patient has a right permacath placed 3 months ago.     OBJECTIVE:  Vital Signs Last 24 Hrs  T(C): 37.3 (10 Feb 2024 05:04), Max: 38.8 (09 Feb 2024 18:41)  T(F): 99.1 (10 Feb 2024 05:04), Max: 101.8 (09 Feb 2024 18:41)  HR: 86 (10 Feb 2024 05:04) (86 - 109)  BP: 172/82 (10 Feb 2024 05:04) (130/70 - 172/82)  BP(mean): 105 (09 Feb 2024 21:20) (105 - 106)  RR: 18 (10 Feb 2024 05:04) (18 - 20)  SpO2: 99% (10 Feb 2024 05:04) (89% - 100%)    Parameters below as of 10 Feb 2024 05:04  Patient On (Oxygen Delivery Method): room air        FOCUSED PHYSICAL EXAM:    LABS:                        7.7    2.85  )-----------( 118      ( 09 Feb 2024 05:53 )             24.7     02-09    137  |  99  |  13  ----------------------------<  154<H>  3.0<L>   |  32<H>  |  4.05<H>    Ca    8.0<L>      09 Feb 2024 05:53  Phos  3.9     02-09  Mg     2.1     02-09    TPro  6.2  /  Alb  2.3<L>  /  TBili  0.4  /  DBili  x   /  AST  49<H>  /  ALT  36  /  AlkPhos  67  02-09      PLAN:   - Norvasc 2.5 mg tab. po x 1 dose .     FOLLOW UP / RESULT:   - Reassess patient VS,   - Continue present care an treatment.

## 2024-02-10 NOTE — DISCHARGE NOTE PROVIDER - PROVIDER TOKENS
FREE:[LAST:[Eusebio],FIRST:[Carmen Turner],PHONE:[(   )    -],FAX:[(   )    -],FOLLOWUP:[1 week]],FREE:[LAST:[Rolando],FIRST:[Balbir],PHONE:[phone]:[(   )    -],ADDRESS:[[address] [place] [address]],FOLLOWUP:[1 week]]

## 2024-02-10 NOTE — DISCHARGE NOTE PROVIDER - NSDCFUSCHEDAPPT_GEN_ALL_CORE_FT
Melany Blount  [facility]  [facility]  Scheduled Appointment: 02/29/2024    [facility]  [facility]   Scheduled Appointment: 03/11/2024    Rosie Fagan  [facility]  ENDOCRIN [address] [place] [address]  Scheduled Appointment: 04/30/2024

## 2024-02-11 LAB
CULTURE RESULTS: ABNORMAL
CULTURE RESULTS: SIGNIFICANT CHANGE UP
MRSA PCR RESULT.: DETECTED
S AUREUS DNA NOSE QL NAA+PROBE: DETECTED
SPECIMEN SOURCE: SIGNIFICANT CHANGE UP
SPECIMEN SOURCE: SIGNIFICANT CHANGE UP

## 2024-02-12 ENCOUNTER — NON-APPOINTMENT (OUTPATIENT)
Age: 54
End: 2024-02-12

## 2024-02-27 NOTE — PHYSICAL EXAM
[de-identified] : WDWN in NAD HEENT:  unremarkable Neck:  supple, no JVD, no LN Lungs:  CTA B/L, no W/R/R Heart:  Reg rate, +S1S2, no M/R/G Abdomen:  soft, NT, ND, +BS, no masses, no HS-megaly Genital: No pubic or genital lesions noted. Ext:  no C/C/E Neuro:  no focal deficits

## 2024-02-27 NOTE — HISTORY OF PRESENT ILLNESS
[Post-hospitalization from ___ Hospital] : Post-hospitalization from [unfilled] Hospital [Patient Contacted By: ____] : and contacted by [unfilled] [FreeTextEntry2] : Ms. LEONEL BEVERLY is a 53 year old   female  with history of  presented today for f/u hospitalization.  [Brief Hospital course] This is a 53 year old female from home, ambulates independently, with PMH of HTN, DM2, ESRD on HD M/W/F, sarcoidosis, anemia, seizure disorder (no longer on meds) who presented to the ED for shortness of breath, fevers, cough, and diarrhea. Patient state she has been having shortness of breath, fevers, and a dry cough for one week. Admitted for sepsis 2/2 influenza and diarrhea. Started on Tamiflu and Imodium. Stool cx and GI PCR negative for any infectious processes. Diarrhea is now resolved. Patient is saturating well on room air. No supplemental oxygen is needed at this time. Patient to receive tamiflu x 3 days. Now optimized for discharge.

## 2024-02-29 ENCOUNTER — APPOINTMENT (OUTPATIENT)
Dept: INTERNAL MEDICINE | Facility: CLINIC | Age: 54
End: 2024-02-29

## 2024-03-04 ENCOUNTER — INPATIENT (INPATIENT)
Facility: HOSPITAL | Age: 54
LOS: 0 days | Discharge: HOME | DRG: 640 | End: 2024-03-05
Attending: INTERNAL MEDICINE | Admitting: INTERNAL MEDICINE
Payer: MEDICARE

## 2024-03-04 VITALS
HEART RATE: 105 BPM | WEIGHT: 105.16 LBS | HEIGHT: 60 IN | DIASTOLIC BLOOD PRESSURE: 121 MMHG | OXYGEN SATURATION: 88 % | RESPIRATION RATE: 20 BRPM | SYSTOLIC BLOOD PRESSURE: 212 MMHG

## 2024-03-04 DIAGNOSIS — R06.02 SHORTNESS OF BREATH: ICD-10-CM

## 2024-03-04 DIAGNOSIS — M54.89 OTHER DORSALGIA: ICD-10-CM

## 2024-03-04 DIAGNOSIS — E11.9 TYPE 2 DIABETES MELLITUS WITHOUT COMPLICATIONS: ICD-10-CM

## 2024-03-04 DIAGNOSIS — D86.9 SARCOIDOSIS, UNSPECIFIED: ICD-10-CM

## 2024-03-04 DIAGNOSIS — I10 ESSENTIAL (PRIMARY) HYPERTENSION: ICD-10-CM

## 2024-03-04 DIAGNOSIS — J81.0 ACUTE PULMONARY EDEMA: ICD-10-CM

## 2024-03-04 DIAGNOSIS — J96.01 ACUTE RESPIRATORY FAILURE WITH HYPOXIA: ICD-10-CM

## 2024-03-04 DIAGNOSIS — Z29.9 ENCOUNTER FOR PROPHYLACTIC MEASURES, UNSPECIFIED: ICD-10-CM

## 2024-03-04 DIAGNOSIS — N18.6 END STAGE RENAL DISEASE: ICD-10-CM

## 2024-03-04 LAB
ALBUMIN SERPL ELPH-MCNC: 2.4 G/DL — LOW (ref 3.5–5)
ALBUMIN SERPL ELPH-MCNC: 3 G/DL — LOW (ref 3.5–5)
ALP SERPL-CCNC: 48 U/L — SIGNIFICANT CHANGE UP (ref 40–120)
ALP SERPL-CCNC: 59 U/L — SIGNIFICANT CHANGE UP (ref 40–120)
ALT FLD-CCNC: 13 U/L DA — SIGNIFICANT CHANGE UP (ref 10–60)
ALT FLD-CCNC: 21 U/L DA — SIGNIFICANT CHANGE UP (ref 10–60)
ANION GAP SERPL CALC-SCNC: 11 MMOL/L — SIGNIFICANT CHANGE UP (ref 5–17)
ANION GAP SERPL CALC-SCNC: 9 MMOL/L — SIGNIFICANT CHANGE UP (ref 5–17)
AST SERPL-CCNC: 13 U/L — SIGNIFICANT CHANGE UP (ref 10–40)
AST SERPL-CCNC: 21 U/L — SIGNIFICANT CHANGE UP (ref 10–40)
BASE EXCESS BLDV CALC-SCNC: 1.2 MMOL/L — SIGNIFICANT CHANGE UP
BASOPHILS # BLD AUTO: 0.03 K/UL — SIGNIFICANT CHANGE UP (ref 0–0.2)
BASOPHILS NFR BLD AUTO: 0.3 % — SIGNIFICANT CHANGE UP (ref 0–2)
BILIRUB DIRECT SERPL-MCNC: <0.1 MG/DL — SIGNIFICANT CHANGE UP (ref 0–0.3)
BILIRUB INDIRECT FLD-MCNC: >0.3 MG/DL — SIGNIFICANT CHANGE UP (ref 0.2–1)
BILIRUB SERPL-MCNC: 0.4 MG/DL — SIGNIFICANT CHANGE UP (ref 0.2–1.2)
BILIRUB SERPL-MCNC: 0.4 MG/DL — SIGNIFICANT CHANGE UP (ref 0.2–1.2)
BUN SERPL-MCNC: 65 MG/DL — HIGH (ref 7–18)
BUN SERPL-MCNC: 71 MG/DL — HIGH (ref 7–18)
CALCIUM SERPL-MCNC: 8.9 MG/DL — SIGNIFICANT CHANGE UP (ref 8.4–10.5)
CALCIUM SERPL-MCNC: 9 MG/DL — SIGNIFICANT CHANGE UP (ref 8.4–10.5)
CHLORIDE SERPL-SCNC: 104 MMOL/L — SIGNIFICANT CHANGE UP (ref 96–108)
CHLORIDE SERPL-SCNC: 105 MMOL/L — SIGNIFICANT CHANGE UP (ref 96–108)
CO2 SERPL-SCNC: 25 MMOL/L — SIGNIFICANT CHANGE UP (ref 22–31)
CO2 SERPL-SCNC: 25 MMOL/L — SIGNIFICANT CHANGE UP (ref 22–31)
CREAT SERPL-MCNC: 6.93 MG/DL — HIGH (ref 0.5–1.3)
CREAT SERPL-MCNC: 7.8 MG/DL — HIGH (ref 0.5–1.3)
EGFR: 6 ML/MIN/1.73M2 — LOW
EGFR: 7 ML/MIN/1.73M2 — LOW
EOSINOPHIL # BLD AUTO: 0.53 K/UL — HIGH (ref 0–0.5)
EOSINOPHIL NFR BLD AUTO: 5.9 % — SIGNIFICANT CHANGE UP (ref 0–6)
GAS PNL BLDV: 138 MMOL/L — SIGNIFICANT CHANGE UP (ref 136–145)
GAS PNL BLDV: SIGNIFICANT CHANGE UP
GLUCOSE BLDC GLUCOMTR-MCNC: 162 MG/DL — HIGH (ref 70–99)
GLUCOSE BLDC GLUCOMTR-MCNC: 167 MG/DL — HIGH (ref 70–99)
GLUCOSE BLDC GLUCOMTR-MCNC: 174 MG/DL — HIGH (ref 70–99)
GLUCOSE BLDC GLUCOMTR-MCNC: 187 MG/DL — HIGH (ref 70–99)
GLUCOSE SERPL-MCNC: 146 MG/DL — HIGH (ref 70–99)
GLUCOSE SERPL-MCNC: 256 MG/DL — HIGH (ref 70–99)
HCG SERPL-ACNC: <1 MIU/ML — SIGNIFICANT CHANGE UP
HCO3 BLDV-SCNC: 28 MMOL/L — SIGNIFICANT CHANGE UP (ref 22–29)
HCT VFR BLD CALC: 28.8 % — LOW (ref 34.5–45)
HCT VFR BLD CALC: 35.2 % — SIGNIFICANT CHANGE UP (ref 34.5–45)
HGB BLD-MCNC: 11.2 G/DL — LOW (ref 11.5–15.5)
HGB BLD-MCNC: 9.1 G/DL — LOW (ref 11.5–15.5)
HOROWITZ INDEX BLDV+IHG-RTO: 21 — SIGNIFICANT CHANGE UP
IMM GRANULOCYTES NFR BLD AUTO: 0.3 % — SIGNIFICANT CHANGE UP (ref 0–0.9)
LACTATE BLDV-MCNC: 1.3 MMOL/L — SIGNIFICANT CHANGE UP (ref 0.5–2)
LYMPHOCYTES # BLD AUTO: 1.78 K/UL — SIGNIFICANT CHANGE UP (ref 1–3.3)
LYMPHOCYTES # BLD AUTO: 19.9 % — SIGNIFICANT CHANGE UP (ref 13–44)
MAGNESIUM SERPL-MCNC: 2.6 MG/DL — SIGNIFICANT CHANGE UP (ref 1.6–2.6)
MCHC RBC-ENTMCNC: 29.6 PG — SIGNIFICANT CHANGE UP (ref 27–34)
MCHC RBC-ENTMCNC: 29.9 PG — SIGNIFICANT CHANGE UP (ref 27–34)
MCHC RBC-ENTMCNC: 31.6 GM/DL — LOW (ref 32–36)
MCHC RBC-ENTMCNC: 31.8 GM/DL — LOW (ref 32–36)
MCV RBC AUTO: 93.8 FL — SIGNIFICANT CHANGE UP (ref 80–100)
MCV RBC AUTO: 94.1 FL — SIGNIFICANT CHANGE UP (ref 80–100)
MONOCYTES # BLD AUTO: 0.49 K/UL — SIGNIFICANT CHANGE UP (ref 0–0.9)
MONOCYTES NFR BLD AUTO: 5.5 % — SIGNIFICANT CHANGE UP (ref 2–14)
NEUTROPHILS # BLD AUTO: 6.09 K/UL — SIGNIFICANT CHANGE UP (ref 1.8–7.4)
NEUTROPHILS NFR BLD AUTO: 68.1 % — SIGNIFICANT CHANGE UP (ref 43–77)
NRBC # BLD: 0 /100 WBCS — SIGNIFICANT CHANGE UP (ref 0–0)
NRBC # BLD: 0 /100 WBCS — SIGNIFICANT CHANGE UP (ref 0–0)
NT-PROBNP SERPL-SCNC: HIGH PG/ML (ref 0–125)
PCO2 BLDV: 50 MMHG — HIGH (ref 39–42)
PH BLDV: 7.35 — SIGNIFICANT CHANGE UP (ref 7.32–7.43)
PHOSPHATE SERPL-MCNC: 4.9 MG/DL — HIGH (ref 2.5–4.5)
PLATELET # BLD AUTO: 170 K/UL — SIGNIFICANT CHANGE UP (ref 150–400)
PLATELET # BLD AUTO: 200 K/UL — SIGNIFICANT CHANGE UP (ref 150–400)
PO2 BLDV: 31 MMHG — SIGNIFICANT CHANGE UP
POTASSIUM BLDV-SCNC: 4.2 MMOL/L — SIGNIFICANT CHANGE UP (ref 3.5–5.1)
POTASSIUM SERPL-MCNC: 4.1 MMOL/L — SIGNIFICANT CHANGE UP (ref 3.5–5.3)
POTASSIUM SERPL-MCNC: 4.8 MMOL/L — SIGNIFICANT CHANGE UP (ref 3.5–5.3)
POTASSIUM SERPL-SCNC: 4.1 MMOL/L — SIGNIFICANT CHANGE UP (ref 3.5–5.3)
POTASSIUM SERPL-SCNC: 4.8 MMOL/L — SIGNIFICANT CHANGE UP (ref 3.5–5.3)
PROT SERPL-MCNC: 5.9 G/DL — LOW (ref 6–8.3)
PROT SERPL-MCNC: 7.1 G/DL — SIGNIFICANT CHANGE UP (ref 6–8.3)
RBC # BLD: 3.07 M/UL — LOW (ref 3.8–5.2)
RBC # BLD: 3.74 M/UL — LOW (ref 3.8–5.2)
RBC # FLD: 16.3 % — HIGH (ref 10.3–14.5)
RBC # FLD: 16.4 % — HIGH (ref 10.3–14.5)
SAO2 % BLDV: 47.6 % — SIGNIFICANT CHANGE UP
SODIUM SERPL-SCNC: 139 MMOL/L — SIGNIFICANT CHANGE UP (ref 135–145)
SODIUM SERPL-SCNC: 140 MMOL/L — SIGNIFICANT CHANGE UP (ref 135–145)
TROPONIN I, HIGH SENSITIVITY RESULT: 103.3 NG/L — HIGH
TROPONIN I, HIGH SENSITIVITY RESULT: 68.5 NG/L — HIGH
WBC # BLD: 6.43 K/UL — SIGNIFICANT CHANGE UP (ref 3.8–10.5)
WBC # BLD: 8.95 K/UL — SIGNIFICANT CHANGE UP (ref 3.8–10.5)
WBC # FLD AUTO: 6.43 K/UL — SIGNIFICANT CHANGE UP (ref 3.8–10.5)
WBC # FLD AUTO: 8.95 K/UL — SIGNIFICANT CHANGE UP (ref 3.8–10.5)

## 2024-03-04 PROCEDURE — 99223 1ST HOSP IP/OBS HIGH 75: CPT | Mod: GC

## 2024-03-04 PROCEDURE — 99285 EMERGENCY DEPT VISIT HI MDM: CPT

## 2024-03-04 PROCEDURE — 71045 X-RAY EXAM CHEST 1 VIEW: CPT | Mod: 26

## 2024-03-04 RX ORDER — SENNA PLUS 8.6 MG/1
2 TABLET ORAL AT BEDTIME
Refills: 0 | Status: DISCONTINUED | OUTPATIENT
Start: 2024-03-04 | End: 2024-03-05

## 2024-03-04 RX ORDER — INFLUENZA VIRUS VACCINE 15; 15; 15; 15 UG/.5ML; UG/.5ML; UG/.5ML; UG/.5ML
0.5 SUSPENSION INTRAMUSCULAR ONCE
Refills: 0 | Status: DISCONTINUED | OUTPATIENT
Start: 2024-03-04 | End: 2024-03-05

## 2024-03-04 RX ORDER — LABETALOL HCL 100 MG
10 TABLET ORAL ONCE
Refills: 0 | Status: COMPLETED | OUTPATIENT
Start: 2024-03-04 | End: 2024-03-04

## 2024-03-04 RX ORDER — TRAZODONE HCL 50 MG
50 TABLET ORAL AT BEDTIME
Refills: 0 | Status: DISCONTINUED | OUTPATIENT
Start: 2024-03-04 | End: 2024-03-05

## 2024-03-04 RX ORDER — LIDOCAINE 4 G/100G
1 CREAM TOPICAL DAILY
Refills: 0 | Status: DISCONTINUED | OUTPATIENT
Start: 2024-03-04 | End: 2024-03-05

## 2024-03-04 RX ORDER — HEPARIN SODIUM 5000 [USP'U]/ML
5000 INJECTION INTRAVENOUS; SUBCUTANEOUS EVERY 12 HOURS
Refills: 0 | Status: DISCONTINUED | OUTPATIENT
Start: 2024-03-04 | End: 2024-03-04

## 2024-03-04 RX ORDER — HEPARIN SODIUM 5000 [USP'U]/ML
5000 INJECTION INTRAVENOUS; SUBCUTANEOUS EVERY 8 HOURS
Refills: 0 | Status: DISCONTINUED | OUTPATIENT
Start: 2024-03-04 | End: 2024-03-05

## 2024-03-04 RX ORDER — ATORVASTATIN CALCIUM 80 MG/1
80 TABLET, FILM COATED ORAL AT BEDTIME
Refills: 0 | Status: DISCONTINUED | OUTPATIENT
Start: 2024-03-04 | End: 2024-03-05

## 2024-03-04 RX ORDER — HYDROMORPHONE HYDROCHLORIDE 2 MG/ML
0.2 INJECTION INTRAMUSCULAR; INTRAVENOUS; SUBCUTANEOUS EVERY 4 HOURS
Refills: 0 | Status: DISCONTINUED | OUTPATIENT
Start: 2024-03-04 | End: 2024-03-05

## 2024-03-04 RX ORDER — ACETAMINOPHEN 500 MG
650 TABLET ORAL EVERY 6 HOURS
Refills: 0 | Status: DISCONTINUED | OUTPATIENT
Start: 2024-03-04 | End: 2024-03-05

## 2024-03-04 RX ORDER — FERROUS SULFATE 325(65) MG
325 TABLET ORAL DAILY
Refills: 0 | Status: DISCONTINUED | OUTPATIENT
Start: 2024-03-04 | End: 2024-03-05

## 2024-03-04 RX ORDER — LACTOBACILLUS ACIDOPHILUS 100MM CELL
1 CAPSULE ORAL DAILY
Refills: 0 | Status: DISCONTINUED | OUTPATIENT
Start: 2024-03-04 | End: 2024-03-05

## 2024-03-04 RX ORDER — HYDROMORPHONE HYDROCHLORIDE 2 MG/ML
0.5 INJECTION INTRAMUSCULAR; INTRAVENOUS; SUBCUTANEOUS EVERY 4 HOURS
Refills: 0 | Status: DISCONTINUED | OUTPATIENT
Start: 2024-03-04 | End: 2024-03-05

## 2024-03-04 RX ORDER — INSULIN GLARGINE 100 [IU]/ML
10 INJECTION, SOLUTION SUBCUTANEOUS EVERY MORNING
Refills: 0 | Status: DISCONTINUED | OUTPATIENT
Start: 2024-03-04 | End: 2024-03-04

## 2024-03-04 RX ORDER — SEVELAMER CARBONATE 2400 MG/1
1600 POWDER, FOR SUSPENSION ORAL THREE TIMES A DAY
Refills: 0 | Status: DISCONTINUED | OUTPATIENT
Start: 2024-03-04 | End: 2024-03-05

## 2024-03-04 RX ORDER — ONDANSETRON 8 MG/1
4 TABLET, FILM COATED ORAL EVERY 8 HOURS
Refills: 0 | Status: DISCONTINUED | OUTPATIENT
Start: 2024-03-04 | End: 2024-03-05

## 2024-03-04 RX ORDER — FUROSEMIDE 40 MG
80 TABLET ORAL ONCE
Refills: 0 | Status: COMPLETED | OUTPATIENT
Start: 2024-03-04 | End: 2024-03-04

## 2024-03-04 RX ORDER — POLYETHYLENE GLYCOL 3350 17 G/17G
17 POWDER, FOR SOLUTION ORAL DAILY
Refills: 0 | Status: DISCONTINUED | OUTPATIENT
Start: 2024-03-04 | End: 2024-03-05

## 2024-03-04 RX ORDER — NIFEDIPINE 30 MG
60 TABLET, EXTENDED RELEASE 24 HR ORAL DAILY
Refills: 0 | Status: DISCONTINUED | OUTPATIENT
Start: 2024-03-04 | End: 2024-03-05

## 2024-03-04 RX ORDER — NALOXONE HYDROCHLORIDE 4 MG/.1ML
0.4 SPRAY NASAL ONCE
Refills: 0 | Status: DISCONTINUED | OUTPATIENT
Start: 2024-03-04 | End: 2024-03-05

## 2024-03-04 RX ORDER — METOPROLOL TARTRATE 50 MG
12.5 TABLET ORAL
Refills: 0 | Status: DISCONTINUED | OUTPATIENT
Start: 2024-03-04 | End: 2024-03-05

## 2024-03-04 RX ORDER — INSULIN LISPRO 100/ML
VIAL (ML) SUBCUTANEOUS AT BEDTIME
Refills: 0 | Status: DISCONTINUED | OUTPATIENT
Start: 2024-03-04 | End: 2024-03-05

## 2024-03-04 RX ORDER — HYDRALAZINE HCL 50 MG
50 TABLET ORAL THREE TIMES A DAY
Refills: 0 | Status: DISCONTINUED | OUTPATIENT
Start: 2024-03-04 | End: 2024-03-05

## 2024-03-04 RX ORDER — LANOLIN ALCOHOL/MO/W.PET/CERES
3 CREAM (GRAM) TOPICAL AT BEDTIME
Refills: 0 | Status: DISCONTINUED | OUTPATIENT
Start: 2024-03-04 | End: 2024-03-05

## 2024-03-04 RX ORDER — INSULIN LISPRO 100/ML
VIAL (ML) SUBCUTANEOUS
Refills: 0 | Status: DISCONTINUED | OUTPATIENT
Start: 2024-03-04 | End: 2024-03-05

## 2024-03-04 RX ORDER — ALBUTEROL 90 UG/1
1 AEROSOL, METERED ORAL EVERY 6 HOURS
Refills: 0 | Status: DISCONTINUED | OUTPATIENT
Start: 2024-03-04 | End: 2024-03-05

## 2024-03-04 RX ORDER — CYCLOBENZAPRINE HYDROCHLORIDE 10 MG/1
5 TABLET, FILM COATED ORAL THREE TIMES A DAY
Refills: 0 | Status: DISCONTINUED | OUTPATIENT
Start: 2024-03-04 | End: 2024-03-05

## 2024-03-04 RX ORDER — GABAPENTIN 400 MG/1
300 CAPSULE ORAL
Refills: 0 | Status: DISCONTINUED | OUTPATIENT
Start: 2024-03-04 | End: 2024-03-05

## 2024-03-04 RX ADMIN — Medication 1: at 12:21

## 2024-03-04 RX ADMIN — Medication 80 MILLIGRAM(S): at 01:50

## 2024-03-04 RX ADMIN — Medication 50 MILLIGRAM(S): at 05:30

## 2024-03-04 RX ADMIN — HEPARIN SODIUM 5000 UNIT(S): 5000 INJECTION INTRAVENOUS; SUBCUTANEOUS at 14:21

## 2024-03-04 RX ADMIN — Medication 650 MILLIGRAM(S): at 16:56

## 2024-03-04 RX ADMIN — Medication 12.5 MILLIGRAM(S): at 18:25

## 2024-03-04 RX ADMIN — GABAPENTIN 300 MILLIGRAM(S): 400 CAPSULE ORAL at 05:30

## 2024-03-04 RX ADMIN — Medication 50 MILLIGRAM(S): at 22:50

## 2024-03-04 RX ADMIN — HEPARIN SODIUM 5000 UNIT(S): 5000 INJECTION INTRAVENOUS; SUBCUTANEOUS at 05:31

## 2024-03-04 RX ADMIN — ATORVASTATIN CALCIUM 80 MILLIGRAM(S): 80 TABLET, FILM COATED ORAL at 22:38

## 2024-03-04 RX ADMIN — SEVELAMER CARBONATE 1600 MILLIGRAM(S): 2400 POWDER, FOR SUSPENSION ORAL at 05:30

## 2024-03-04 RX ADMIN — Medication 60 MILLIGRAM(S): at 05:30

## 2024-03-04 RX ADMIN — Medication 12.5 MILLIGRAM(S): at 05:30

## 2024-03-04 RX ADMIN — Medication 10 MILLIGRAM(S): at 05:30

## 2024-03-04 RX ADMIN — HYDROMORPHONE HYDROCHLORIDE 0.5 MILLIGRAM(S): 2 INJECTION INTRAMUSCULAR; INTRAVENOUS; SUBCUTANEOUS at 04:24

## 2024-03-04 RX ADMIN — SENNA PLUS 2 TABLET(S): 8.6 TABLET ORAL at 22:39

## 2024-03-04 RX ADMIN — SEVELAMER CARBONATE 1600 MILLIGRAM(S): 2400 POWDER, FOR SUSPENSION ORAL at 14:22

## 2024-03-04 RX ADMIN — Medication 1: at 08:59

## 2024-03-04 RX ADMIN — Medication 325 MILLIGRAM(S): at 12:21

## 2024-03-04 RX ADMIN — Medication 10 MILLIGRAM(S): at 01:49

## 2024-03-04 RX ADMIN — Medication 50 MILLIGRAM(S): at 22:39

## 2024-03-04 RX ADMIN — Medication 50 MILLIGRAM(S): at 14:21

## 2024-03-04 RX ADMIN — HEPARIN SODIUM 5000 UNIT(S): 5000 INJECTION INTRAVENOUS; SUBCUTANEOUS at 22:39

## 2024-03-04 RX ADMIN — HYDROMORPHONE HYDROCHLORIDE 0.5 MILLIGRAM(S): 2 INJECTION INTRAMUSCULAR; INTRAVENOUS; SUBCUTANEOUS at 04:20

## 2024-03-04 RX ADMIN — SEVELAMER CARBONATE 1600 MILLIGRAM(S): 2400 POWDER, FOR SUSPENSION ORAL at 22:38

## 2024-03-04 RX ADMIN — Medication 1 TABLET(S): at 12:22

## 2024-03-04 RX ADMIN — Medication 10 MILLIGRAM(S): at 04:21

## 2024-03-04 RX ADMIN — Medication 650 MILLIGRAM(S): at 17:30

## 2024-03-04 RX ADMIN — LIDOCAINE 1 PATCH: 4 CREAM TOPICAL at 12:22

## 2024-03-04 RX ADMIN — GABAPENTIN 300 MILLIGRAM(S): 400 CAPSULE ORAL at 18:25

## 2024-03-04 NOTE — H&P ADULT - PROBLEM SELECTOR PROBLEM 1
Patient seen at bedside in NAD.  VSS.  Patient resting comfortably without complaints. Patient reports that he is feeling much better.  I&D performed by ENT.  ENT recommending DC home on Augmentin, medrol dosepak and close outpatient follow up.  STrict return precautions provided.  -Carlos Lucero PA-C Acute hypoxic respiratory failure

## 2024-03-04 NOTE — PROVIDER CONTACT NOTE (CRITICAL VALUE NOTIFICATION) - BACKGROUND
Patient came to ED with worsening sob today. Patient hx with CKD- on HD three times a day. last HD friday

## 2024-03-04 NOTE — H&P ADULT - PROBLEM SELECTOR PLAN 5
On Januvia outpaitent  Lantus 10U in AM for conservative; in the setting of ESRD  Sliding Scale Consistently in 230's systolic and in the setting of increased exertion may have lead to increased afterload on heart  Cont Amlodipine  Losartan  Hydralazine Consistently in 230's systolic and in the setting of increased exertion may have lead to increased afterload on heart  Cont Amlodipine  Losartan  Hydralazine  Goal BP reduction 25% ( end of 3/4)

## 2024-03-04 NOTE — ED PROVIDER NOTE - PHYSICAL EXAMINATION
Gen: well appearing  Neck: neck supple  Resp: +crackles bilaterally   CV: mild tachycardia , RRR, +S1/S2, no M/R/G  GI: Abdomen soft non-distended, NTTP, no masses  Ext: no edema, no deformity, warm and well-perfused  Skin: no rash or bruising

## 2024-03-04 NOTE — H&P ADULT - PROBLEM SELECTOR PLAN 7
Heparin Q8 Lidocaine  Dilaudid for breakthrough Lidocaine  Dilaudid for breakthrough  Flexeril as needed

## 2024-03-04 NOTE — H&P ADULT - NSHPREVIEWOFSYSTEMS_GEN_ALL_CORE
CONSTITUTIONAL: No fever  RESPIRATORY:  +Shortness of breath  CARDIOVASCULAR: No chest pain  GASTROINTESTINAL: No abdominal pain.  GENITOURINARY: No dysuria   NEUROLOGICAL: No headaches,  ENDOCRINE: No polyuria  musculoskeletal Left lower back muscle aches  HEME: no easy bruisability  SKIN: No itching, burning, rashes, or lesions .

## 2024-03-04 NOTE — H&P ADULT - ASSESSMENT
53F from home ambulates independently, with PMH of HTN, DM2, ESRD on HD M/W/F (last session 3/1 makes minimal urine), sarcoidosis on prednisone, anemia, seizure disorder (no longer on meds) who presented to the ED for shortness of breath. Patient state she has been having shortness of breath, since [place] from the mall today. Patient was ambulating, at the mall, came home, and when she took a nap, she woke up feeling like she couldn't breathe. BP 230s systolic which is normal for patient at dialysis, and Cr >6 where her baseline post HD is 3-4, Pro BNP :>088124 with CXR showing increased effusions bilaterally since Jan all concerning for AHRF secondary to fluid overload     53F from home ambulates independently, with PMH of HTN, DM2, ESRD on HD M/W/F (last session 3/1 makes minimal urine), sarcoidosis on prednisone, anemia, seizure disorder (no longer on meds) who presented to the ED for shortness of breath. Patient state she has been having shortness of breath, since [place] from the mall today. Patient was ambulating, at the mall, came home, and when she took a nap, she woke up feeling like she couldn't breathe. BP 230s systolic which is normal for patient at dialysis, and Cr >6 where her baseline post HD is 3-4, Pro BNP :>212442 with CXR showing increased effusions bilaterally since Jan all concerning for AHRF secondary to fluid overload in the setting of flash pulmonary edema

## 2024-03-04 NOTE — ED PROVIDER NOTE - CLINICAL SUMMARY MEDICAL DECISION MAKING FREE TEXT BOX
53 year old female from home, ambulates independently, with PMH of HTN, DM2, ESRD on HD M/W/F, sarcoidosis, anemia, seizure disorder (no longer on meds) presenting to the ED with chief complaint of shortness of breath,  which started a couple of hours ago  after returning from the mall. Pt sating 88 on 4L NC improved to 100 on 6L.   Patient also hypertensive sbp 200s,   Denying any visual disturbances, chest pain, abdominal pain.   patient otherwise well-appearing, speaking full sentences.  Exam significant for crackles bilaterally in the upper and lower lung fields,   Clinical picture consistent with fluid overload.  Will admit patient for dialysis.

## 2024-03-04 NOTE — H&P ADULT - ATTENDING COMMENTS
IMAGING  Chest X-Ray  Pending official read; on my read there is bilateral increased vascular markings suggestive of fluid overload, left costophrenic angle blunting.    EKG  Normal Sinus Rhythm, 86 bpm, QTc 440ms, DC 148ms, on my read no ST segment elevation or depression, TWI in leads II, III, aVF    HPI  53 year old female patient with pmhx HTN, DM2, ESRD on HD M/W/F (last session 3/1 makes a little urine occasionally), sarcoidosis on prednisone, anemia, seizure disorder (no longer on meds) who presented to the ER due to shortness of breath that started a couple hours prior to arrival to ER.  The patient was at the mall and came home for a nap. When she woke up she couldn't breath.    Review Of Systems included: + shortness of breath, + lightheadedness, + headache, + back pain,   no cough, no sore throat, no runny nose, no dysuria    Physical Exam  General: Awake, Alert, Oriented  Cardiac: RRR  Pulmonary: Crackles at bases b/l  Abdominal: Soft, ND, NT  Back: Tenderness to palpation of low back spinal processes with tight paraspinal muscles on left side    A/P  # Fluid Overload  # HTN Crisis  # Hx of ESRD on HD M/W/F  s/p 80mg IV Lasix  last HD on Friday; completed full 3 hours  BNP > 158k  Blood pressure 233/126 likely causing pulmonary edema due to high afterload  Goal of 25% reduction in blood pressure in first 24 hours with gradual reduction thereafter  Systolic blood pressure goal of 175 in first 24 hours   - Consult Nephrology  - Continue home antihypertensive medications Amlodipine, Losartan, Hydralazine  - IV Labetalol prn  - Next Hemodialysis would be for today    # Elevated Troponin 2/2 High Afterload and ESRD  Troponin 103.3 in the ER  - Follow up repeat Troponin    # Low Back Pain  - Lidocaine Patch  - Tylenol prn    # Hx of DM  Blood Glucose of 146 in the ER  - Insulin Sliding Scale  - Blood Glucose Monitoring  - Can monitor blood glucose for 24 hours before starting long acting insulin if needed    # Hx of Sarcoidosis  - Continue home medication Prednisone    # DVT PPx  - Heparin    # FEN  - Diabetic Renal Diet  - Monitor and replete electrolytes as needed    Previous Admissions Included  2/9/2024 - 2/10/2024: Sepsis 2/2 Influenza and Diarrhea.  1/5/2024 - 1/7/2024: Hyperkalemia, Fluid Overloaded, and positive for Rhinovirus.  12/27/2023 - 12/29/2023: HTN Urgency and shortness of breath after missed dialysis session.  12/8/2023 - 12/10/2023: HTN Emergency after missing Monday and Wednesday dialysis.  10/25/2023 - 10/27/2023: Diarrhea. Positive Orthostatic Vital Signs.  9/20/2023 - 9/28/2023: Sepsis. Positive for Enterorhinovirus. R IJ Shiley was placed. HD initiated. UTI. Endocrinology also adjusted the patient's insulin regimen while in the  hospital. Patient will be discharged on 16U lantus and 10U premeal.  9/6/2023 - 9/7/2023: sent by nephrologist for  initiation of HD. Left AMA prior to shiley placement.  2/20/2023 - 2/25/2023: SOB and LE edema 2/2 missing torsemide  1/26/2023 - 1/31/2023: HTN Urgency, MIKALA  4/18/2020 - 4/27/2020: COVID-19. Hyperglycemia.    Patient case and management was discussed with ER Attending  I did examine all labs and imaging    Time-based billing (NON-critical care).  76 minutes spent on total encounter excluding any time spent teaching residents. The necessity of the time spent during the encounter on this date of service was due to: Review of records, vital signs, labs, microbiology, and imaging, Ordering labs and/or tests, General physical examination performed, Generation of treatment plan, Counseling of the patient

## 2024-03-04 NOTE — H&P ADULT - PROBLEM SELECTOR PLAN 1
p/w SOB after exertion, lying in bed BNP :>[id number] with CXR showing increased effusions bilaterally since Jan all concerning for AHRF secondary to fluid overload  HD Monday 3/4  Cesar Estrada consulted p/w SOB after exertion, lying in bed BNP :>203917 with CXR showing increased effusions bilaterally since Jan all concerning for AHRF secondary to fluid overload  HD Monday 3/4  Dulcegisselle Estrada consulted  Social Work Consult  Continue O2 PRN, Titrate down with diuresis p/w SOB after exertion, SBP Consistently in 230's systolic and in the setting of increased exertion may have lead to increased afterload on heart lying in bed BNP :>712951  CXR showing increased effusions bilaterally since Jan all concerning for AHRF secondary to fluid overload/flash pulmonary edema   HD Monday 3/4  Dulcegisselle Estrada consulted  Social Work Consult  Continue O2 PRN, Titrate down with diuresis

## 2024-03-04 NOTE — ED PROVIDER NOTE - OBJECTIVE STATEMENT
53 year old female from home, ambulates independently, with PMH of HTN, DM2, ESRD on HD M/W/F, sarcoidosis, anemia, seizure disorder (no longer on meds) presenting to the ED with chief complaint of shortness of breath,  which started a couple of hours ago  after [place] from the mall.  Patient states that she still makes some urine,   Denies any past history of CHF.    Denying any past history of blood clots, leg pain or leg edema, recent travel or surgeries. She is denying any chest pain, nausea, vomiting, cough, fever or chills.  Patient  has a right-sided permacath  for dialysis access.  She follows with 3 nephrologist but is unable to recall their names.  Patient also unable to recall her PMD.

## 2024-03-04 NOTE — PATIENT PROFILE ADULT - FALL HARM RISK - HARM RISK INTERVENTIONS

## 2024-03-04 NOTE — H&P ADULT - HISTORY OF PRESENT ILLNESS
53F from home ambulates independently, with PMH of HTN, DM2, ESRD on HD M/W/F (last session 3/1 makes minimal urine), sarcoidosis on prednisone, anemia, seizure disorder (no longer on meds) who presented to the ED for shortness of breath. Patient state she has been having shortness of breath, since [place] from the mall today. Patient was ambulating, at the mall, came home, and when she took a nap, she woke up feeling like she couldn't breathe. She also endorses leg cramps and paraspinal tenderness in her left lower back.    Denies CP, HA, Fevers, Chills, NVD    In the ED, BP 230s systolic which is normal for patient at dialysis, and Cr >6 where her baseline post HD is 3-4, Pro BNP :>514089 with CXR showing increased effusions bilaterally since Tim   53F from home ambulates independently, with PMH of HTN, DM2, ESRD on HD M/W/F (last session 3/1 makes minimal urine), sarcoidosis on prednisone, anemia, seizure disorder (no longer on meds) who presented to the ED for shortness of breath. Patient state she has been having shortness of breath, since [place] from the mall today. Patient was ambulating, at the mall, came home, and when she took a nap, she woke up feeling like she couldn't breathe. She also endorses leg cramps and paraspinal tenderness in her left lower back.    Denies CP, HA, Fevers, Chills, NVD    In the ED, BP 230s systolic which is normal for patient at dialysis, and Cr >6 where her baseline post HD is 3-4, Pro BNP >[id number] with CXR showing increased effusions bilaterally since Tim

## 2024-03-04 NOTE — PATIENT PROFILE ADULT - NSPROHMSYMPCOND_GEN_A_NUR
Abnormal uterine bleeding    Ms. Israel Zabala 42 year old P2 (SVDx2) presents for complaints of abnormal uterine bleeding.   Specifically she reports irregular periods.   She reports skipping her periods. At times, it does not return until 2 weeks after her expected period. The longest she has gone without a period has been 2 months.   Her last menstrual period was 2/26.   This has been the pattern of her periods for about a year.   Denies dyspareunia, denies postcoital bleeding.     She also reports recently (since Thursday), low left sided back pain that radiates around to her left lower abdomen. She had to call of work when it was really intense on the day of onset. She reports today that it is more of a dull ache.     She reports that she has sought care for her obesity at [facility]. She was recently started on phenetermine 8 mg daily dosing. She has been on this since 2/2019. Overall, she is not pleased with how much weight she has been losing.     Of note, she had been previously seen for abnormal uterine bleeding with Dr. Willie Atkinson. At that time, following review of treatment options, she elected to proceed with a hysterectomy.   Proposed hysterectomy was vaginal hysterectomy and bilateral salpingectomy +/- right oophorectomy given hx of recurrent ovarian cysts.   She had declined office endometrial sampling at that time to rule out hyperplasia/malignancy and was counseled on the potential need for future surgeries of final pathology of her hysterectomy returned malignant.       Past Medical History:   Diagnosis Date     Abnormal glandular Papanicolaou smear of cervix 2002    Negative colpo per patient     Chickenpox      Chronic maxillary sinusitis 3/31/2006     CTS (carpal tunnel syndrome) 3/6/2014     Situational anxiety 12/13/2012    Related to her cancer diagnosis     Past Surgical History:   Procedure Laterality Date     BRONCHOSCOPY FLEXIBLE  11/1/2013    related to carcinoid;  Flexible  Bronchoscopy, Endobronchial Flexible Ultrasound;  Surgeon: Chris Sal MD;  Location: [facility] OR     C REMOVAL OF LUNG,BILOBECTOMY Right 11/22/2013    Carcinoid: Right Middle and Lower Lobe     ENDOBRONCHIAL ULTRASOUND FLEXIBLE  11/1/2013    Procedure: ENDOBRONCHIAL ULTRASOUND FLEXIBLE;;  Surgeon: Chris Sal MD;  Location: [facility] OR     GASTRIC BYPASS  2003     HERNIORRHAPHY INCISIONAL (LOCATION)  8/19/2011    Procedure:HERNIORRHAPHY INCISIONAL (LOCATION); Incisional Hernia Repair upper abdomen midline       TONSILLECTOMY  age 11     Current Outpatient Medications   Medication     Cholecalciferol (VITAMIN D) 2000 UNITS tablet     ferrous sulfate (IRON) 325 (65 FE) MG tablet     Multiple Vitamins-Minerals (MULTIVITAMIN PO)     VITAMIN B-12 PO     No current facility-administered medications for this visit.       No Known Allergies    Social History     Tobacco Use     Smoking status: Former Smoker     Packs/day: 0.10     Years: 8.00     Pack years: 0.80     Types: Cigarettes     Last attempt to quit: 9/26/2008     Years since quitting: 10.5     Smokeless tobacco: Never Used   Substance Use Topics     Alcohol use: Yes     Comment: occasional     Drug use: No     Family History   Problem Relation Age of Onset     Breast Cancer Maternal Grandmother 65     Cancer Maternal Grandmother         skin     Diabetes Maternal Grandfather      Heart Disease Paternal Grandmother         MI     Cerebrovascular Disease Paternal Grandmother      Heart Disease Paternal Grandfather         MI     Gastrointestinal Disease Brother         Colitis     Prostate Cancer Father      Cancer Mother         skin     Congenital Anomalies Daughter         imperforate anus     C: NEGATIVE for fever, chills, change in weight  HEENT: NEGATIVE for visual changes, runny nose, epistaxis, ear pain, tinnitus, tooth ache, sore throat, difficulty with swallowing, sinus pain  R: NEGATIVE for significant cough or SOB  CV: NEGATIVE for  chest pain, palpitations or peripheral edema  BREAST: NEGATIVE For soreness, pain, lumps or nipple discharge  GI: NEGATIVE for nausea, abdominal pain, heartburn, or change in bowel habits  : NEGATIVE for frequency, dysuria, hematuria, vaginal discharge  Neuro: NEGATIVE for numbness, tingling, focal weakness, or headache  INT: NEGATIVE for rashes, lesions or pruritis   PSYCH: NEGATIVE for anxiety, depression, or halluciinations    Exam:   BP (!) 140/91 (BP Location: Left arm, Patient Position: Chair, Cuff Size: Adult Large)   Temp 98.1  F (36.7  C) (Tympanic)   Wt 123.8 kg (273 lb)   LMP 02/13/2019 (Exact Date)   Breastfeeding? No   BMI 49.53 kg/m    General Appearance: Well nourished, well developed female, NAD, AOx3  Neurological: Mental Status Normal and Station and Gait Normal   Skin: Normal skin turgor  HEENT: Atraumatic, normocephalic, EOMI  Lungs: Good respiratory effort  Abdomen: Soft, NT, ND, no masses  Pelvic: Normal external female genitalia.  No external lesions, normal hair distribution, no adenopathy. Speculum exam reveals vaginal epithelium well rugated with no abnormal discharge. Cervix appears smooth, pink, with no visible lesions. Bimanual exam reveals normal size uterus, anteverted, non-tender, and mobile. No adnexal masses or tenderness. No cervical motion tenderness.   Extremities: No clubbing, no cyanosis and no edema    A/P: Abnormal uterine bleeding  -- pattern of bleeding is consistent with anovulatory bleeding  -- reviewed treatment options including medical vs surgical options; she is averse to birth control therapy due to concern for hormonal side effects; she will consider Mirena IUD after discussion on limited systemic side effects of medication due to its localized affects; discussed hysterectomy but that with her underlying obesity (particulary extensive truncal obesity), that hysterectomy would pose high surgical risk  -- encouraged continued efforts at weight loss with the  help of the associated clinic at [facility], and that this would improve her bleeding irregularity  -- endometrial sampling encouraged given long standing issue; but will defer this for future visit following pelvic ultrasound (do not want biopsy to affect the ultrasound results in terms of lining assessment)   -- pelvic US ordered to assess for structural etiology for bleeding irregularity as well as evaluate cause of left sided discomfort ie ovarian cysts  -- TSH, free T4 ordered to rule out endocrinopathy that could cause her bleeding irregularity   -- bleeding precautions reviewed  -- instructed to return to clinic following pelvic ultrasound to obtain endometrial biopsy; permitted to be double booked for this future appointment.      Devante Painting MD  [facility]       none

## 2024-03-04 NOTE — H&P ADULT - NSHPPHYSICALEXAM_GEN_ALL_CORE
GENERAL: NAD, thin, frail appearing  HEAD:  Atraumatic, Normocephalic  EYES: EOMI, PERRLA, conjunctiva and sclera clear  ENMT: Poor dentition, No lesions  NECK: Supple, normal appearance,   NERVOUS SYSTEM:  Alert & Oriented X3,  Moves all extremities spontaneously  CHEST/LUNG: Crackles lower fields  HEART: Regular rate and rhythm; No murmurs, rubs, or gallops  ABDOMEN: Soft, Nontender, Nondistended; Bowel sounds present  EXTREMITIES:  2+ Peripheral Pulses, No clubbing, cyanosis, or edema  SKIN: No rashes or lesions;  Good capillary refill

## 2024-03-04 NOTE — CONSULT NOTE ADULT - ASSESSMENT
Patient is a 53y Female whom presented to the hospital with shortness of breath. She has ESRD on HD at [facility] MWF. H/O noncompliance with HD medications, salt and fluid restrictions.  In ED blood pressure elevated, CXR with pulmonary edema and pleural effusion as well as elevated OK-BNP.  renal consulted for ESRD    # ESRD,presenting with flash pulmonary edema and hypertensive emergency. plan for HD today. with [facility] as tolerated.  Discussed compliance with HD, fluid and salt intake   # ANEMIA OF Chronic Kidney Disease Stage. epogen on HD   # CKDMBD. phos at goal

## 2024-03-04 NOTE — ED ADULT NURSE NOTE - NSFALLHARMRISKINTERV_ED_ALL_ED

## 2024-03-04 NOTE — CONSULT NOTE ADULT - SUBJECTIVE AND OBJECTIVE BOX
[facility] : Progress Note :: [phone], (office [phone]),   Dr Estrada / Dr Das / Dr Gale / Dr Vega / Dr Debbie FORD / Dr Garcia / Dr Chandra / Dr Jona pal  _____________________________________________________________________________________________  Patient is a 53y Female whom presented to the hospital with shortness of breath. She has ESRD on HD at [facility] [place] [facility]. H/O noncompliance with HD medications, salt and fluid restrictions.  In ED blood pressure elevated, CXR with pulmonary edema and pleural effusion as well as elevated ME-BNP.  renal consulted for ESRD    PAST MEDICAL & SURGICAL HISTORY:  Sarcoidosis      Diabetes      Hypertension      Hyperlipidemia      Chronic kidney disease (CKD), stage III (moderate)      Seizure disorder      No significant past surgical history        No Known Allergies    Home Medications Reviewed  Hospital Medications:   MEDICATIONS  (STANDING):  atorvastatin 80 milliGRAM(s) Oral at bedtime  ferrous    sulfate 325 milliGRAM(s) Oral daily  gabapentin 300 milliGRAM(s) Oral two times a day  heparin   Injectable 5000 Unit(s) SubCutaneous every 8 hours  hydrALAZINE 50 milliGRAM(s) Oral three times a day  influenza   Vaccine 0.5 milliLiter(s) IntraMuscular once  insulin lispro (ADMELOG) corrective regimen sliding scale   SubCutaneous at bedtime  insulin lispro (ADMELOG) corrective regimen sliding scale   SubCutaneous three times a day before meals  lactobacillus acidophilus 1 Tablet(s) Oral daily  lidocaine   4% Patch 1 Patch Transdermal daily  metoprolol tartrate 12.5 milliGRAM(s) Oral two times a day  naloxone Injectable 0.4 milliGRAM(s) IV Push once  NIFEdipine XL 60 milliGRAM(s) Oral daily  polyethylene glycol 3350 17 Gram(s) Oral daily  predniSONE   Tablet 10 milliGRAM(s) Oral daily  senna 2 Tablet(s) Oral at bedtime  sevelamer carbonate 1600 milliGRAM(s) Oral three times a day  traZODone 50 milliGRAM(s) Oral at bedtime    SOCIAL HISTORY:  Denies ETOh,Smoking,   FAMILY HISTORY:  Family history of diabetes mellitus in first degree relative        VITALS:  T(F): 97.4 (03-04-24 @ 17:45), Max: 99.4 (03-04-24 @ 07:00)  HR: 93 (03-04-24 @ 17:45)  BP: 145/91 (03-04-24 @ 17:45)  RR: 17 (03-04-24 @ 17:45)  SpO2: 99% (03-04-24 @ 17:45)  Wt(kg): --    03-04 @ 07:01  -  03-04 @ 19:17  --------------------------------------------------------  IN: 600 mL / OUT: 3600 mL / NET: -3000 mL      Height (cm): 152.4 (03-04 @ 00:32)  Weight (kg): 47.7 (03-04 @ 00:32)  BMI (kg/m2): 20.5 (03-04 @ 00:32)  BSA (m2): 1.42 (03-04 @ 00:32)  PHYSICAL EXAM:  Constitutional: NAD  HEENT: anicteric sclera, oropharynx clear.  Neck: No JVD  Respiratory: CTAB, no wheezes, rales or rhonchi  Cardiovascular: S1, S2, RRR  Gastrointestinal: BS+, soft, NT/ND  Extremities: No peripheral edema  Neurological: A/O x 3, no focal deficits  Vascular Access: IJ permacath.    LABS:  03-04    140  |  104  |  71<H>  ----------------------------<  256<H>  4.8   |  25  |  7.80<H>    Ca    8.9      04 Mar 2024 15:15  Phos  4.9     03-04  Mg     2.6     03-04    TPro  5.9<L>  /  Alb  2.4<L>  /  TBili  0.4  /  DBili  <0.1  /  AST  13  /  ALT  13  /  AlkPhos  48  03-04    Creatinine Trend: 7.80 <--, 6.93 <--                        9.1    6.43  )-----------( 170      ( 04 Mar 2024 15:15 )             28.8     Urine Studies:  Urinalysis Basic - ( 04 Mar 2024 15:15 )    Color:  / Appearance:  / SG:  / pH:   Gluc: 256 mg/dL / Ketone:   / Bili:  / Urobili:    Blood:  / Protein:  / Nitrite:    Leuk Esterase:  / RBC:  / WBC    Sq Epi:  / Non Sq Epi:  / Bacteria:         RADIOLOGY & ADDITIONAL STUDIES:

## 2024-03-04 NOTE — PATIENT PROFILE ADULT - BILL PAYMENT
yes
Partially impaired: cannot see medication labels or newsprint, but can see obstacles in path, and the surrounding layout; can count fingers at arm's length

## 2024-03-04 NOTE — PATIENT PROFILE ADULT - FUNCTIONAL ASSESSMENT - BASIC MOBILITY 6.
3-calculated by average/Not able to assess (calculate score using [facility] averaging method)

## 2024-03-04 NOTE — CHART NOTE - NSCHARTNOTEFT_GEN_A_CORE
Brief HPI: pt seen at bedside and c/o mild SOB improving from admission      Objective: Vital signs last  24hrs  Vital Signs Last 24 Hrs  T(C): 36.4 (04 Mar 2024 15:14), Max: 37.4 (04 Mar 2024 07:00)  T(F): 97.6 (04 Mar 2024 15:14), Max: 99.4 (04 Mar 2024 07:00)  HR: 101 (04 Mar 2024 15:14) (76 - 105)  BP: 170/91 (04 Mar 2024 15:14) (143/86 - 233/126)  BP(mean): --  RR: 16 (04 Mar 2024 15:14) (16 - 26)  SpO2: 100% (04 Mar 2024 15:14) (88% - 100%)    Parameters below as of 04 Mar 2024 15:14  Patient On (Oxygen Delivery Method): nasal cannula  O2 Flow (L/min): 3    Focus PE:  Constitutional: in bed resting comfortably w/o signs of distress  Neuro- alert and oriented x 4, speaking w/ clear articulate speech  ENT-no rhinorrhea   Neck- supple, NT, no cervical LAD  CV-RRR, nml S1S2, no mmr, rubs or gallops, right chest wall permacath  Resp- fine crackles throughout  GI- Abd sft, nt, nd, nr or guarding, +BS   Extremities- all extremities w/ FROM, +1 edema to BLE    Labs:                         9.1    6.43  )-----------( 170      ( 04 Mar 2024 15:15 )             28.8   03-04    140  |  104  |  71<H>  ----------------------------<  256<H>  4.8   |  25  |  7.80<H>    Ca    8.9      04 Mar 2024 15:15  Phos  4.9     03-04  Mg     2.6     03-04    TPro  5.9<L>  /  Alb  2.4<L>  /  TBili  0.4  /  DBili  <0.1  /  AST  13  /  ALT  13  /  AlkPhos  48  03-04        Imaging:  < from: Xray Chest 1 View- PORTABLE-Urgent (03.04.24 @ 01:22) >    PROCEDURE DATE:  03/04/2024          INTERPRETATION:  EXAM: XR CHEST URGENT    INDICATION: Chest Pain HXR    COMPARISON: January 5, 2024    IMPRESSION: Increasing perihilar interstitial markings bilaterally in a   patient with a double lumen dialysis catheter the tip of which is in the   right atrium. Findings compatible with progressive congestive changes. An   underlying inflammatory infectious process is not absolutely excluded.   Borderline cardiomegaly. Regional osseous structures appropriate for age    --- End of Report ---    A/P:  Pt is a 53F from home ambulates independently, with PMH of HTN, DM2, ESRD on HD M/W/F (last session 3/1 makes minimal urine), sarcoidosis on prednisone, anemia, seizure disorder (no longer on meds) who presented to the ED for shortness of breath. Patient state she has been having shortness of breath, since [place] from the mall today. Patient was ambulating, at the mall, came home, and when she took a nap, she woke up feeling like she couldn't breathe. BP 230s systolic which is normal for patient at dialysis, and Cr >6 where her baseline post HD is 3-4, Pro BNP :>581587 with CXR showing increased effusions bilaterally since Jan all concerning for AHRF secondary to fluid overload in the setting of flash pulmonary edema       Plan:    1. S/P Lasix 80mg IV Push for fluid overload---Ashely Estrada following for HD today (home schedule is MWF)             2. Nutrition consult              3. S/P Labetalol 10mg IV push in ED, C/W hydralazine 50 TID, Metoprolol 12.5mg BID

## 2024-03-04 NOTE — ED ADULT NURSE NOTE - OBJECTIVE STATEMENT
coffee pt came to the ED for c/o  difficulty breathing started today, due for dialysis tomorrow Monday.  pt stated that goes to HD MWF

## 2024-03-04 NOTE — H&P ADULT - PROBLEM SELECTOR PLAN 6
Lidocaine  Dilaudid for breakthrough On Januvia outpaitent  Lantus 10U in AM for conservative; in the setting of ESRD  Sliding Scale

## 2024-03-05 ENCOUNTER — TRANSCRIPTION ENCOUNTER (OUTPATIENT)
Age: 54
End: 2024-03-05

## 2024-03-05 VITALS
DIASTOLIC BLOOD PRESSURE: 91 MMHG | TEMPERATURE: 98 F | HEART RATE: 96 BPM | SYSTOLIC BLOOD PRESSURE: 116 MMHG | OXYGEN SATURATION: 99 % | RESPIRATION RATE: 18 BRPM

## 2024-03-05 LAB
ANION GAP SERPL CALC-SCNC: 6 MMOL/L — SIGNIFICANT CHANGE UP (ref 5–17)
BUN SERPL-MCNC: 33 MG/DL — HIGH (ref 7–18)
CALCIUM SERPL-MCNC: 8.6 MG/DL — SIGNIFICANT CHANGE UP (ref 8.4–10.5)
CHLORIDE SERPL-SCNC: 102 MMOL/L — SIGNIFICANT CHANGE UP (ref 96–108)
CK SERPL-CCNC: 21 U/L — SIGNIFICANT CHANGE UP (ref 21–215)
CO2 SERPL-SCNC: 28 MMOL/L — SIGNIFICANT CHANGE UP (ref 22–31)
CREAT SERPL-MCNC: 4.58 MG/DL — HIGH (ref 0.5–1.3)
EGFR: 11 ML/MIN/1.73M2 — LOW
GLUCOSE BLDC GLUCOMTR-MCNC: 194 MG/DL — HIGH (ref 70–99)
GLUCOSE BLDC GLUCOMTR-MCNC: 265 MG/DL — HIGH (ref 70–99)
GLUCOSE SERPL-MCNC: 173 MG/DL — HIGH (ref 70–99)
HBV SURFACE AB SER-ACNC: SIGNIFICANT CHANGE UP
HBV SURFACE AG SER-ACNC: SIGNIFICANT CHANGE UP
HCT VFR BLD CALC: 29.7 % — LOW (ref 34.5–45)
HGB BLD-MCNC: 9.6 G/DL — LOW (ref 11.5–15.5)
MAGNESIUM SERPL-MCNC: 2.4 MG/DL — SIGNIFICANT CHANGE UP (ref 1.6–2.6)
MCHC RBC-ENTMCNC: 29.7 PG — SIGNIFICANT CHANGE UP (ref 27–34)
MCHC RBC-ENTMCNC: 32.3 GM/DL — SIGNIFICANT CHANGE UP (ref 32–36)
MCV RBC AUTO: 92 FL — SIGNIFICANT CHANGE UP (ref 80–100)
NRBC # BLD: 0 /100 WBCS — SIGNIFICANT CHANGE UP (ref 0–0)
PHOSPHATE SERPL-MCNC: 5.4 MG/DL — HIGH (ref 2.5–4.5)
PLATELET # BLD AUTO: 184 K/UL — SIGNIFICANT CHANGE UP (ref 150–400)
POTASSIUM SERPL-MCNC: 3.7 MMOL/L — SIGNIFICANT CHANGE UP (ref 3.5–5.3)
POTASSIUM SERPL-SCNC: 3.7 MMOL/L — SIGNIFICANT CHANGE UP (ref 3.5–5.3)
RBC # BLD: 3.23 M/UL — LOW (ref 3.8–5.2)
RBC # FLD: 16.2 % — HIGH (ref 10.3–14.5)
SODIUM SERPL-SCNC: 136 MMOL/L — SIGNIFICANT CHANGE UP (ref 135–145)
WBC # BLD: 4.98 K/UL — SIGNIFICANT CHANGE UP (ref 3.8–10.5)
WBC # FLD AUTO: 4.98 K/UL — SIGNIFICANT CHANGE UP (ref 3.8–10.5)

## 2024-03-05 PROCEDURE — 86706 HEP B SURFACE ANTIBODY: CPT

## 2024-03-05 PROCEDURE — 82803 BLOOD GASES ANY COMBINATION: CPT

## 2024-03-05 PROCEDURE — 83605 ASSAY OF LACTIC ACID: CPT

## 2024-03-05 PROCEDURE — 84295 ASSAY OF SERUM SODIUM: CPT

## 2024-03-05 PROCEDURE — 96374 THER/PROPH/DIAG INJ IV PUSH: CPT

## 2024-03-05 PROCEDURE — 71045 X-RAY EXAM CHEST 1 VIEW: CPT

## 2024-03-05 PROCEDURE — 80048 BASIC METABOLIC PNL TOTAL CA: CPT

## 2024-03-05 PROCEDURE — 84132 ASSAY OF SERUM POTASSIUM: CPT

## 2024-03-05 PROCEDURE — 80076 HEPATIC FUNCTION PANEL: CPT

## 2024-03-05 PROCEDURE — 82550 ASSAY OF CK (CPK): CPT

## 2024-03-05 PROCEDURE — 84100 ASSAY OF PHOSPHORUS: CPT

## 2024-03-05 PROCEDURE — 84702 CHORIONIC GONADOTROPIN TEST: CPT

## 2024-03-05 PROCEDURE — 85025 COMPLETE CBC W/AUTO DIFF WBC: CPT

## 2024-03-05 PROCEDURE — 82962 GLUCOSE BLOOD TEST: CPT

## 2024-03-05 PROCEDURE — 80053 COMPREHEN METABOLIC PANEL: CPT

## 2024-03-05 PROCEDURE — 96375 TX/PRO/DX INJ NEW DRUG ADDON: CPT

## 2024-03-05 PROCEDURE — 99261: CPT

## 2024-03-05 PROCEDURE — 36415 COLL VENOUS BLD VENIPUNCTURE: CPT

## 2024-03-05 PROCEDURE — 83735 ASSAY OF MAGNESIUM: CPT

## 2024-03-05 PROCEDURE — 85027 COMPLETE CBC AUTOMATED: CPT

## 2024-03-05 PROCEDURE — 93005 ELECTROCARDIOGRAM TRACING: CPT

## 2024-03-05 PROCEDURE — 84484 ASSAY OF TROPONIN QUANT: CPT

## 2024-03-05 PROCEDURE — 99285 EMERGENCY DEPT VISIT HI MDM: CPT

## 2024-03-05 PROCEDURE — 99239 HOSP IP/OBS DSCHRG MGMT >30: CPT | Mod: GC

## 2024-03-05 PROCEDURE — 87340 HEPATITIS B SURFACE AG IA: CPT

## 2024-03-05 PROCEDURE — 83880 ASSAY OF NATRIURETIC PEPTIDE: CPT

## 2024-03-05 RX ORDER — HYDRALAZINE HCL 50 MG
1 TABLET ORAL
Qty: 90 | Refills: 0
Start: 2024-03-05 | End: 2024-04-03

## 2024-03-05 RX ORDER — METOPROLOL TARTRATE 50 MG
1 TABLET ORAL
Refills: 0 | DISCHARGE

## 2024-03-05 RX ORDER — CHLORHEXIDINE GLUCONATE 213 G/1000ML
1 SOLUTION TOPICAL
Refills: 0 | Status: DISCONTINUED | OUTPATIENT
Start: 2024-03-05 | End: 2024-03-05

## 2024-03-05 RX ORDER — ROSUVASTATIN CALCIUM 5 MG/1
1 TABLET ORAL
Qty: 0 | Refills: 0 | DISCHARGE

## 2024-03-05 RX ORDER — ROSUVASTATIN CALCIUM 5 MG/1
1 TABLET ORAL
Qty: 30 | Refills: 0
Start: 2024-03-05 | End: 2024-04-03

## 2024-03-05 RX ORDER — MUPIROCIN 20 MG/G
1 OINTMENT TOPICAL
Refills: 0 | Status: DISCONTINUED | OUTPATIENT
Start: 2024-03-05 | End: 2024-03-05

## 2024-03-05 RX ORDER — NIFEDIPINE 30 MG
1 TABLET, EXTENDED RELEASE 24 HR ORAL
Qty: 30 | Refills: 0
Start: 2024-03-05 | End: 2024-04-03

## 2024-03-05 RX ORDER — ATORVASTATIN CALCIUM 80 MG/1
1 TABLET, FILM COATED ORAL
Qty: 30 | Refills: 0
Start: 2024-03-05 | End: 2024-04-03

## 2024-03-05 RX ORDER — METOPROLOL TARTRATE 50 MG
1 TABLET ORAL
Qty: 30 | Refills: 0
Start: 2024-03-05 | End: 2024-04-03

## 2024-03-05 RX ORDER — DULAGLUTIDE 4.5 MG/.5ML
0.75 INJECTION, SOLUTION SUBCUTANEOUS
Qty: 0 | Refills: 0 | DISCHARGE

## 2024-03-05 RX ADMIN — Medication 10 MILLIGRAM(S): at 06:18

## 2024-03-05 RX ADMIN — Medication 650 MILLIGRAM(S): at 06:56

## 2024-03-05 RX ADMIN — Medication 3: at 11:23

## 2024-03-05 RX ADMIN — GABAPENTIN 300 MILLIGRAM(S): 400 CAPSULE ORAL at 06:16

## 2024-03-05 RX ADMIN — Medication 325 MILLIGRAM(S): at 11:25

## 2024-03-05 RX ADMIN — HEPARIN SODIUM 5000 UNIT(S): 5000 INJECTION INTRAVENOUS; SUBCUTANEOUS at 06:19

## 2024-03-05 RX ADMIN — Medication 12.5 MILLIGRAM(S): at 06:16

## 2024-03-05 RX ADMIN — Medication 1: at 08:05

## 2024-03-05 RX ADMIN — Medication 50 MILLIGRAM(S): at 06:18

## 2024-03-05 RX ADMIN — Medication 60 MILLIGRAM(S): at 06:17

## 2024-03-05 RX ADMIN — SEVELAMER CARBONATE 1600 MILLIGRAM(S): 2400 POWDER, FOR SUSPENSION ORAL at 06:17

## 2024-03-05 RX ADMIN — Medication 650 MILLIGRAM(S): at 06:26

## 2024-03-05 NOTE — PROGRESS NOTE ADULT - SUBJECTIVE AND OBJECTIVE BOX
PGY-1 Progress Note discussed with attending      PLEASE CONTACT ON CALL TEAM:  - On Call Team (Please refer to Aline) FROM 5:00 PM - 8:30PM  - Nightfloat Team FROM 8:30 -7:30 AM    INTERVAL HPI/OVERNIGHT EVENTS:       REVIEW OF SYSTEMS:  CONSTITUTIONAL: No fever, weight loss, or fatigue  RESPIRATORY: No cough, wheezing,  or hemoptysis; No shortness of breath  CARDIOVASCULAR: No chest pain, palpitations, dizziness, or leg swelling  GASTROINTESTINAL: No abdominal pain. No nausea, vomiting, or hematemesis; No diarrhea or constipation. No melena or hematochezia.  GENITOURINARY: No dysuria, hematuria, or urinary frequency  NEUROLOGICAL: No headaches, memory loss. No focal weakness, numbness, or tremors  SKIN: No itching, burning, or rashes    MEDICATIONS  (STANDING):  atorvastatin 80 milliGRAM(s) Oral at bedtime  chlorhexidine 2% Cloths 1 Application(s) Topical <User Schedule>  ferrous    sulfate 325 milliGRAM(s) Oral daily  gabapentin 300 milliGRAM(s) Oral two times a day  heparin   Injectable 5000 Unit(s) SubCutaneous every 8 hours  hydrALAZINE 50 milliGRAM(s) Oral three times a day  influenza   Vaccine 0.5 milliLiter(s) IntraMuscular once  insulin lispro (ADMELOG) corrective regimen sliding scale   SubCutaneous at bedtime  insulin lispro (ADMELOG) corrective regimen sliding scale   SubCutaneous three times a day before meals  lactobacillus acidophilus 1 Tablet(s) Oral daily  lidocaine   4% Patch 1 Patch Transdermal daily  metoprolol tartrate 12.5 milliGRAM(s) Oral two times a day  mupirocin 2% Ointment 1 Application(s) Both Nostrils two times a day  naloxone Injectable 0.4 milliGRAM(s) IV Push once  NIFEdipine XL 60 milliGRAM(s) Oral daily  polyethylene glycol 3350 17 Gram(s) Oral daily  predniSONE   Tablet 10 milliGRAM(s) Oral daily  senna 2 Tablet(s) Oral at bedtime  sevelamer carbonate 1600 milliGRAM(s) Oral three times a day  traZODone 50 milliGRAM(s) Oral at bedtime    MEDICATIONS  (PRN):  acetaminophen     Tablet .. 650 milliGRAM(s) Oral every 6 hours PRN Temp greater or equal to 38C (100.4F), Mild Pain (1 - 3)  albuterol    90 MICROgram(s) HFA Inhaler 1 Puff(s) Inhalation every 6 hours PRN Bronchospasm  aluminum hydroxide/magnesium hydroxide/simethicone Suspension 30 milliLiter(s) Oral every 4 hours PRN Dyspepsia  bisacodyl 5 milliGRAM(s) Oral daily PRN Constipation  cyclobenzaprine 5 milliGRAM(s) Oral three times a day PRN Muscle Spasm  HYDROmorphone  Injectable 0.5 milliGRAM(s) IV Push every 4 hours PRN Severe Pain (7 - 10)  HYDROmorphone  Injectable 0.2 milliGRAM(s) IV Push every 4 hours PRN Moderate Pain (4 - 6)  melatonin 3 milliGRAM(s) Oral at bedtime PRN Insomnia  ondansetron Injectable 4 milliGRAM(s) IV Push every 8 hours PRN Nausea and/or Vomiting      Vital Signs Last 24 Hrs  T(C): 37.3 (05 Mar 2024 05:17), Max: 37.3 (05 Mar 2024 05:17)  T(F): 99.1 (05 Mar 2024 05:17), Max: 99.1 (05 Mar 2024 05:17)  HR: 90 (05 Mar 2024 05:17) (77 - 101)  BP: 142/75 (05 Mar 2024 05:17) (142/75 - 170/91)  BP(mean): --  RR: 18 (05 Mar 2024 05:17) (16 - 18)  SpO2: 100% (05 Mar 2024 05:17) (97% - 100%)    Parameters below as of 05 Mar 2024 05:17  Patient On (Oxygen Delivery Method): nasal cannula  O2 Flow (L/min): 3      PHYSICAL EXAMINATION:  GENERAL: NAD, lying in bed  HEAD: Atraumatic, Normocephalic  EYES: Conjunctiva and sclera clear  NECK: Supple. No JVD. Normal thyroid  CHEST/LUNG: Clear to auscultation. No rales or wheezing  HEART: Regular rate and rhythm. No abnormal heart sounds  ABDOMEN: Soft, nontender, and nondistended. Bowel sounds present. No pain or masses on palpation  NERVOUS SYSTEM: Alert & Oriented X3  EXTREMITIES:  2+ Peripheral Pulses. No appreciable edema  SKIN: Warm, dry                          9.6    4.98  )-----------( 184      ( 05 Mar 2024 05:33 )             29.7     03-05    136  |  102  |  33<H>  ----------------------------<  173<H>  3.7   |  28  |  4.58<H>    Ca    8.6      05 Mar 2024 05:33  Phos  5.4     03-05  Mg     2.4     03-05    TPro  5.9<L>  /  Alb  2.4<L>  /  TBili  0.4  /  DBili  <0.1  /  AST  13  /  ALT  13  /  AlkPhos  48  03-04    LIVER FUNCTIONS - ( 04 Mar 2024 15:15 )  Alb: 2.4 g/dL / Pro: 5.9 g/dL / ALK PHOS: 48 U/L / ALT: 13 U/L DA / AST: 13 U/L / GGT: x                   I&O's Summary    04 Mar 2024 07:01  -  05 Mar 2024 07:00  --------------------------------------------------------  IN: 600 mL / OUT: 3600 mL / NET: -3000 mL            CAPILLARY BLOOD GLUCOSE      RADIOLOGY & ADDITIONAL TESTS:

## 2024-03-05 NOTE — DISCHARGE NOTE NURSING/CASE MANAGEMENT/SOCIAL WORK - PATIENT PORTAL LINK FT
You can access the FollowMyHealth Patient Portal offered by [facility] by registering at the following website: [email or web address]. By joining [employer]’s FollowMyHealth portal, you will also be able to view your health information using other applications (apps) compatible with our system.

## 2024-03-05 NOTE — DISCHARGE NOTE PROVIDER - NSDCMRMEDTOKEN_GEN_ALL_CORE_FT
albuterol 90 mcg/inh inhalation aerosol: 1 puff(s) inhaled every 6 hours As needed Bronchospasm  ergocalciferol 50 mcg (2000 intl units) oral capsule: 1 cap(s) orally every 7 days  ferrous sulfate 325 mg (65 mg elemental iron) oral tablet: 1 tab(s) orally once a day  gabapentin 300 mg oral capsule: 1 cap(s) orally 2 times a day  hydrALAZINE 50 mg oral tablet: 1 tab(s) orally 3 times a day  lactobacillus acidophilus oral capsule: 1 cap(s) orally once a day  metoprolol succinate 25 mg oral tablet, extended release: 1 tab(s) orally once a day  NIFEdipine 60 mg oral tablet, extended release: 1 tab(s) orally once a day  predniSONE 10 mg oral tablet: 1 tab(s) orally once a day  rosuvastatin 40 mg oral tablet: 1 tab(s) orally once a day  sevelamer carbonate 800 mg oral tablet: 2 tab(s) orally 3 times a day  traZODone 50 mg oral tablet: 1 tab(s) orally once a day (at bedtime)  Trulicity Pen 0.75 mg/0.5 mL subcutaneous solution: 0.75 milligram(s) subcutaneously once a week   ferrous sulfate 325 mg (65 mg elemental iron) oral tablet: 1 tab(s) orally once a day  gabapentin 300 mg oral capsule: 1 cap(s) orally 2 times a day  hydrALAZINE 50 mg oral tablet: 1 tab(s) orally 3 times a day  metoprolol succinate 25 mg oral tablet, extended release: 1 tab(s) orally once a day  NIFEdipine 60 mg oral tablet, extended release: 1 tab(s) orally once a day  predniSONE 10 mg oral tablet: 1 tab(s) orally once a day  rosuvastatin 40 mg oral tablet: 1 tab(s) orally once a day  sevelamer carbonate 800 mg oral tablet: 2 tab(s) orally 3 times a day   ergocalciferol 50 mcg (2000 intl units) oral capsule: 1 cap(s) orally every 7 days  ferrous sulfate 325 mg (65 mg elemental iron) oral tablet: 1 tab(s) orally once a day  gabapentin 300 mg oral capsule: 1 cap(s) orally 2 times a day  hydrALAZINE 50 mg oral tablet: 1 tab(s) orally 3 times a day  metoprolol succinate 25 mg oral tablet, extended release: 1 tab(s) orally once a day  NIFEdipine 60 mg oral tablet, extended release: 1 tab(s) orally once a day  predniSONE 10 mg oral tablet: 1 tab(s) orally once a day  rosuvastatin 10 mg oral capsule: 1 cap(s) orally once a day (at bedtime)  sevelamer carbonate 800 mg oral tablet: 2 tab(s) orally 3 times a day   atorvastatin 10 mg oral tablet: 1 tab(s) orally once a day (at bedtime)  ergocalciferol 50 mcg (2000 intl units) oral capsule: 1 cap(s) orally every 7 days  ferrous sulfate 325 mg (65 mg elemental iron) oral tablet: 1 tab(s) orally once a day  gabapentin 300 mg oral capsule: 1 cap(s) orally 2 times a day  hydrALAZINE 50 mg oral tablet: 1 tab(s) orally 3 times a day  metoprolol succinate 25 mg oral tablet, extended release: 1 tab(s) orally once a day  NIFEdipine 60 mg oral tablet, extended release: 1 tab(s) orally once a day  predniSONE 10 mg oral tablet: 1 tab(s) orally once a day  sevelamer carbonate 800 mg oral tablet: 2 tab(s) orally 3 times a day

## 2024-03-05 NOTE — PROGRESS NOTE ADULT - ASSESSMENT
52 y/o F from home, ambulates independently, w pmh of HTN, DM2, ESRD on HD M/W/F (last session 3/1 makes minimal urine), sarcoidosis on prednisone, anemia, seizure disorder (no longer on meds) presented to the ED for SOB starting earlier the day of admission. Initial workup was significant for SBP 230s, pBNP >[id number], with CXR showing increased effusions bilaterally since Jan. Pt admitted for all AHRF secondary to fluid overload in the setting of flash pulmonary edema and hypertensive emergency.

## 2024-03-05 NOTE — DISCHARGE NOTE PROVIDER - NSDCCPCAREPLAN_GEN_ALL_CORE_FT
PRINCIPAL DISCHARGE DIAGNOSIS  Diagnosis: Acute respiratory failure with hypoxia  Assessment and Plan of Treatment: You came in with shortness of breath. Your chest x ray was showing signs of fluid in the lungs. You were requiring oxygen. Nephrology was emergently consulted and you underwent dialysis on 3/4/24. Your oxygenation improved and you no longer were short of breath. PLEASE MAINTAIN YOUR HEMODIALYSIS SESSIONS AS INSTRUCTED (MONDAYS, WEDNESDAYS AND FRIDAYS). PLEASE DO NOT MISS ANY HEMODIALYSIS SESSIONS. PLEASE IF YOU DEVELOP SHORTNESS OF BREATH, CHEST PAIN PLEASE RETURN TO THE HOSPITAL. PLEASE FOLLOW WITH YOUR PRIMARY CARE PROVIDER.      SECONDARY DISCHARGE DIAGNOSES  Diagnosis: HTN (hypertension)  Assessment and Plan of Treatment: When you came into the hospital your blood pressure were severly elevated. This is due to multiple things because you were not dialyzed on your schedule day as an outpatient. You were urgently dialyzed on 3/4/24. PLEASE CONSUME A LOW SALT DIET. PLEASE CHECK YOUR BLOOD PRESSURE EVERYDAY. PLEASE CONTINUE TO TAKE HYDRALAZINE 50MG ONE TABLET EVERY EIGHT HOURS. PLEASE TAKE METOPROLOL 25MG ONE TABLET PER DAY AND NIFEDIPINE 60MG ONE TABLET PER DAY FOR YOUR BLOOD PRESSURES. THESE MEDICATIONS WERE SENT TO YOUR PHARMACY AS REFILLS. PLEASE RETURN TO THE HOSPITAL IF YOU DEVELOP WORSENING BLOOD PRESSURE OVER 200's. PLEASE RETURN TO THE HOSPITAL IF YOU DEVELOP CHEST PAIN, SHORTNESS OF BREATH OR VISUAL CHANGES.    Diagnosis: DM (diabetes mellitus)  Assessment and Plan of Treatment: PLEASE CONTINE TO TAKE TRULICITY EVERY WEEK AS PRESCRIBED. PLEASE CHECK YOUR BLOOD SUGAR EVERY DAY. PLEASE CONSUME A HEALTHY AND WELL BALANCED DIET. PLEASE FOLLOW WITH YOUR PRIMARY CARE PROVIDER.    Diagnosis: ESRD on dialysis  Assessment and Plan of Treatment: PLEASE DO NOT MISS YOUR HEMODILAYSIS SESSIONS. PLEASE CONTINUE YOUR HEMODIALYSIS ON MONDAYS, WEDNESDAYS AND FRIDAYS. PLEASE FOLLOW WITH YOUR PRIMARY CARE PROVIDER.    Diagnosis: Sarcoidosis  Assessment and Plan of Treatment: PLEASE CONTINUE TO TAKE YOUR PREDNISONE 10MG ONE TABLET PER DAY. PLEASE FOLLOW WITH YOUR PRIMARY CARE PROVIDER.     PRINCIPAL DISCHARGE DIAGNOSIS  Diagnosis: Acute respiratory failure with hypoxia  Assessment and Plan of Treatment: You came in with worsening shortness of breath. Your chest x ray was showing signs of excess fluid in the lungs. You were initially requiring oxygen. Nephrology was emergently consulted and you underwent dialysis on 3/4/24. Your oxygenation improved and you no longer were short of breath.You are saturating more than 95% on room air with ambulation.   Please ensure herrera t you get full session    PLEASE MAINTAIN YOUR HEMODIALYSIS SESSIONS AS INSTRUCTED (MONDAYS, WEDNESDAYS AND FRIDAYS). PLEASE DO NOT MISS ANY HEMODIALYSIS SESSIONS. PLEASE IF YOU DEVELOP SHORTNESS OF BREATH, CHEST PAIN PLEASE RETURN TO THE HOSPITAL. PLEASE FOLLOW WITH YOUR PRIMARY CARE PROVIDER.      SECONDARY DISCHARGE DIAGNOSES  Diagnosis: HTN (hypertension)  Assessment and Plan of Treatment: When you came into the hospital your blood pressure were severly elevated. This is due to multiple things because you were not dialyzed on your schedule day as an outpatient. You were urgently dialyzed on 3/4/24. PLEASE CONSUME A LOW SALT DIET. PLEASE CHECK YOUR BLOOD PRESSURE EVERYDAY. PLEASE CONTINUE TO TAKE HYDRALAZINE 50MG ONE TABLET EVERY EIGHT HOURS. PLEASE TAKE METOPROLOL 25MG ONE TABLET PER DAY AND NIFEDIPINE 60MG ONE TABLET PER DAY FOR YOUR BLOOD PRESSURES. THESE MEDICATIONS WERE SENT TO YOUR PHARMACY AS REFILLS. PLEASE RETURN TO THE HOSPITAL IF YOU DEVELOP WORSENING BLOOD PRESSURE OVER 200's. PLEASE RETURN TO THE HOSPITAL IF YOU DEVELOP CHEST PAIN, SHORTNESS OF BREATH OR VISUAL CHANGES.    Diagnosis: DM (diabetes mellitus)  Assessment and Plan of Treatment: PLEASE CONTINE TO TAKE TRULICITY EVERY WEEK AS PRESCRIBED. PLEASE CHECK YOUR BLOOD SUGAR EVERY DAY. PLEASE CONSUME A HEALTHY AND WELL BALANCED DIET. PLEASE FOLLOW WITH YOUR PRIMARY CARE PROVIDER.    Diagnosis: ESRD on dialysis  Assessment and Plan of Treatment: PLEASE DO NOT MISS YOUR HEMODILAYSIS SESSIONS. PLEASE CONTINUE YOUR HEMODIALYSIS ON MONDAYS, WEDNESDAYS AND FRIDAYS. PLEASE FOLLOW WITH YOUR PRIMARY CARE PROVIDER.    Diagnosis: Sarcoidosis  Assessment and Plan of Treatment: PLEASE CONTINUE TO TAKE YOUR PREDNISONE 10MG ONE TABLET PER DAY. PLEASE FOLLOW WITH YOUR PRIMARY CARE PROVIDER.     PRINCIPAL DISCHARGE DIAGNOSIS  Diagnosis: Acute respiratory failure with hypoxia  Assessment and Plan of Treatment: You came in with worsening shortness of breath. Your chest x ray was showing signs of excess fluid in the lungs. You were initially requiring oxygen. Nephrology was emergently consulted and you underwent dialysis on 3/4/24. Your oxygenation improved and you no longer were short of breath.You are saturating more than 95% on room air with ambulation.   Please ensure that you get full session of Dialysis and adequate fluid removed as eess fluid build up also raises your Blood pressure which in turn can further worsen fluid build up.   PLEASE MAINTAIN YOUR HEMODIALYSIS SESSIONS AS INSTRUCTED (MONDAYS, WEDNESDAYS AND FRIDAYS). PLEASE DO NOT MISS ANY HEMODIALYSIS SESSIONS. PLEASE IF YOU DEVELOP SHORTNESS OF BREATH, CHEST PAIN PLEASE RETURN TO THE HOSPITAL. PLEASE FOLLOW WITH YOUR PRIMARY CARE PROVIDER.      SECONDARY DISCHARGE DIAGNOSES  Diagnosis: HTN (hypertension)  Assessment and Plan of Treatment: When you came into the hospital your blood pressure were severly elevated. This is due to multiple things because you were not dialyzed on your schedule day as an outpatient. You were urgently dialyzed on 3/4/24. PLEASE CONSUME A LOW SALT DIET. PLEASE CHECK YOUR BLOOD PRESSURE EVERYDAY. PLEASE CONTINUE TO TAKE HYDRALAZINE 50MG ONE TABLET THREE TIMES A DAY. PLEASE TAKE METOPROLOL ER 25MG ONE TABLET PER DAY AND NIFEDIPINE 60 MG ONE TABLET DAILY FOR YOUR BLOOD PRESSURE CONTORL.   YOUR BP IS WELL CONTROLLED WITH CURRENT REGIMEN AS WELL AS ADEQUATE DIALYSIS.   THESE MEDICATIONS WERE SENT TO YOUR PHARMACY AS REFILLS. PLEASE RETURN TO THE HOSPITAL IF YOU DEVELOP WORSENING BLOOD PRESSURE OVER 200's. PLEASE RETURN TO THE HOSPITAL IF YOU DEVELOP CHEST PAIN, SHORTNESS OF BREATH OR VISUAL CHANGES.    Diagnosis: DM (diabetes mellitus)  Assessment and Plan of Treatment: PLEASE CONTINE TO TAKE TRULICITY EVERY WEEK AS PRESCRIBED. PLEASE CHECK YOUR BLOOD SUGAR EVERY DAY. PLEASE CONSUME A HEALTHY AND WELL BALANCED DIET. PLEASE FOLLOW WITH YOUR PRIMARY CARE PROVIDER.    Diagnosis: ESRD on dialysis  Assessment and Plan of Treatment: PLEASE DO NOT MISS YOUR HEMODILAYSIS SESSIONS. PLEASE CONTINUE YOUR HEMODIALYSIS ON MONDAYS, WEDNESDAYS AND FRIDAYS. PLEASE FOLLOW WITH YOUR PRIMARY CARE PROVIDER.    Diagnosis: Sarcoidosis  Assessment and Plan of Treatment: PLEASE CONTINUE TO TAKE YOUR PREDNISONE 10 MG ONE TABLET PER DAY. PLEASE FOLLOW WITH YOUR PRIMARY CARE PROVIDER.    Diagnosis: Diabetic neuropathy  Assessment and Plan of Treatment:      PRINCIPAL DISCHARGE DIAGNOSIS  Diagnosis: Acute respiratory failure with hypoxia  Assessment and Plan of Treatment: You came in with worsening shortness of breath. Your chest x ray was showing signs of excess fluid in the lungs. You were initially requiring oxygen. Nephrology was emergently consulted and you underwent dialysis on 3/4/24. Your oxygenation improved and you no longer were short of breath.You are saturating more than 95% on room air with ambulation.   Please ensure that you get full session of Dialysis and adequate fluid removed as eess fluid build up also raises your Blood pressure which in turn can further worsen fluid build up.   PLEASE MAINTAIN YOUR HEMODIALYSIS SESSIONS AS INSTRUCTED (MONDAYS, WEDNESDAYS AND FRIDAYS). PLEASE DO NOT MISS ANY HEMODIALYSIS SESSIONS. PLEASE IF YOU DEVELOP SHORTNESS OF BREATH, CHEST PAIN PLEASE RETURN TO THE HOSPITAL. PLEASE FOLLOW WITH YOUR PRIMARY CARE PROVIDER.      SECONDARY DISCHARGE DIAGNOSES  Diagnosis: HTN (hypertension)  Assessment and Plan of Treatment: When you came into the hospital your blood pressure were severly elevated. This is due to multiple things because you were not dialyzed on your schedule day as an outpatient. You were urgently dialyzed on 3/4/24. PLEASE CONSUME A LOW SALT DIET. PLEASE CHECK YOUR BLOOD PRESSURE EVERYDAY. PLEASE CONTINUE TO TAKE HYDRALAZINE 50MG ONE TABLET THREE TIMES A DAY. PLEASE TAKE METOPROLOL ER 25MG ONE TABLET PER DAY AND NIFEDIPINE 60 MG ONE TABLET DAILY FOR YOUR BLOOD PRESSURE CONTORL.   YOUR BP IS WELL CONTROLLED WITH CURRENT REGIMEN AS WELL AS ADEQUATE DIALYSIS.   THESE MEDICATIONS WERE SENT TO YOUR PHARMACY AS REFILLS. PLEASE RETURN TO THE HOSPITAL IF YOU DEVELOP WORSENING BLOOD PRESSURE OVER 200's. PLEASE RETURN TO THE HOSPITAL IF YOU DEVELOP CHEST PAIN, SHORTNESS OF BREATH OR VISUAL CHANGES.    Diagnosis: ESRD on dialysis  Assessment and Plan of Treatment: PLEASE DO NOT MISS YOUR HEMODILAYSIS SESSIONS. PLEASE CONTINUE YOUR HEMODIALYSIS ON MONDAYS, WEDNESDAYS AND FRIDAYS. PLEASE FOLLOW WITH YOUR PRIMARY CARE PROVIDER.    Diagnosis: Sarcoidosis  Assessment and Plan of Treatment: PLEASE CONTINUE TO TAKE YOUR PREDNISONE 10 MG ONE TABLET PER DAY. PLEASE FOLLOW WITH YOUR PRIMARY CARE PROVIDER.    Diagnosis: Diabetic neuropathy  Assessment and Plan of Treatment:      PRINCIPAL DISCHARGE DIAGNOSIS  Diagnosis: Acute respiratory failure with hypoxia  Assessment and Plan of Treatment: You came in with worsening shortness of breath. Your chest x ray was showing signs of excess fluid in the lungs. You were initially requiring oxygen. Nephrology was emergently consulted and you underwent dialysis on 3/4/24. Your oxygenation improved and you no longer were short of breath.You are saturating more than 95% on room air with ambulation.   Please ensure that you get full session of Dialysis and adequate fluid removed as eess fluid build up also raises your Blood pressure which in turn can further worsen fluid build up.   PLEASE MAINTAIN YOUR HEMODIALYSIS SESSIONS AS INSTRUCTED (MONDAYS, WEDNESDAYS AND FRIDAYS). PLEASE DO NOT MISS ANY HEMODIALYSIS SESSIONS. PLEASE IF YOU DEVELOP SHORTNESS OF BREATH, CHEST PAIN PLEASE RETURN TO THE HOSPITAL. PLEASE FOLLOW WITH YOUR PRIMARY CARE PROVIDER.      SECONDARY DISCHARGE DIAGNOSES  Diagnosis: HTN (hypertension)  Assessment and Plan of Treatment: When you came into the hospital your blood pressure were severly elevated. This is due to multiple things because you were not dialyzed on your schedule day as an outpatient. You were urgently dialyzed on 3/4/24. PLEASE CONSUME A LOW SALT DIET. PLEASE CHECK YOUR BLOOD PRESSURE EVERYDAY. PLEASE CONTINUE TO TAKE HYDRALAZINE 50MG ONE TABLET THREE TIMES A DAY. PLEASE TAKE METOPROLOL ER 25MG ONE TABLET PER DAY AND NIFEDIPINE 60 MG ONE TABLET DAILY FOR YOUR BLOOD PRESSURE CONTORL.   YOUR BP IS WELL CONTROLLED WITH CURRENT REGIMEN AS WELL AS ADEQUATE DIALYSIS.   THESE MEDICATIONS WERE SENT TO YOUR PHARMACY AS REFILLS. PLEASE RETURN TO THE HOSPITAL IF YOU DEVELOP WORSENING BLOOD PRESSURE OVER 200's. PLEASE RETURN TO THE HOSPITAL IF YOU DEVELOP CHEST PAIN, SHORTNESS OF BREATH OR VISUAL CHANGES.    Diagnosis: ESRD on dialysis  Assessment and Plan of Treatment: PLEASE DO NOT MISS YOUR HEMODILAYSIS SESSIONS. PLEASE CONTINUE YOUR HEMODIALYSIS ON MONDAYS, WEDNESDAYS AND FRIDAYS. PLEASE FOLLOW WITH YOUR PRIMARY CARE PROVIDER.    Diagnosis: Sarcoidosis  Assessment and Plan of Treatment: PLEASE CONTINUE TO TAKE YOUR PREDNISONE 10 MG ONE TABLET PER DAY. PLEASE FOLLOW WITH YOUR PRIMARY CARE PROVIDER.  DO NOT STOP PREDNISONE WITHOUT YOUR PROVIDER KNOWLEDGE. ALSO PLEASE INFORM ANY OF YOUR PROVIDERS THAT YOU ARE TAKING PREDNICONE DAILY.    Diagnosis: History of diabetes mellitus  Assessment and Plan of Treatment: You had prior history of Diabetes and was Trulicity in the past. You reported not on any Diabetic meeications since being on Dilaysis. You are at risk of elevated sugars from being on prednisone for Sarcoidosis. Monitor sugars closely and A1c with PCP every 3 to 6 months.    Diagnosis: Diabetic neuropathy  Assessment and Plan of Treatment: Please continue with Gabapentin twice daily as before.     PRINCIPAL DISCHARGE DIAGNOSIS  Diagnosis: Acute respiratory failure with hypoxia  Assessment and Plan of Treatment: You came in with worsening shortness of breath. Your chest x ray was showing signs of excess fluid in the lungs. You were initially requiring oxygen. Nephrology was emergently consulted and you underwent dialysis on 3/4/24. Your oxygenation improved and you no longer were short of breath.You are saturating more than 95% on room air with ambulation.   Please ensure that you get full session of Dialysis and adequate fluid removed as eess fluid build up also raises your Blood pressure which in turn can further worsen fluid build up.   PLEASE MAINTAIN YOUR HEMODIALYSIS SESSIONS AS INSTRUCTED (MONDAYS, WEDNESDAYS AND FRIDAYS). PLEASE DO NOT MISS ANY HEMODIALYSIS SESSIONS. PLEASE IF YOU DEVELOP SHORTNESS OF BREATH, CHEST PAIN PLEASE RETURN TO THE HOSPITAL. PLEASE FOLLOW WITH YOUR PRIMARY CARE PROVIDER.      SECONDARY DISCHARGE DIAGNOSES  Diagnosis: HTN (hypertension)  Assessment and Plan of Treatment: When you came into the hospital your blood pressure were severly elevated. This is due to multiple things because you were not dialyzed on your schedule day as an outpatient. You were urgently dialyzed on 3/4/24. PLEASE CONSUME A LOW SALT DIET. PLEASE CHECK YOUR BLOOD PRESSURE EVERYDAY. PLEASE CONTINUE TO TAKE HYDRALAZINE 50MG ONE TABLET THREE TIMES A DAY. PLEASE TAKE METOPROLOL ER 25MG ONE TABLET PER DAY AND NIFEDIPINE 60 MG ONE TABLET DAILY FOR YOUR BLOOD PRESSURE CONTORL.   YOUR BP IS WELL CONTROLLED WITH CURRENT REGIMEN AS WELL AS ADEQUATE DIALYSIS.   THESE MEDICATIONS WERE SENT TO YOUR PHARMACY AS REFILLS. PLEASE RETURN TO THE HOSPITAL IF YOU DEVELOP WORSENING BLOOD PRESSURE OVER 200's. PLEASE RETURN TO THE HOSPITAL IF YOU DEVELOP CHEST PAIN, SHORTNESS OF BREATH OR VISUAL CHANGES.    Diagnosis: ESRD on dialysis  Assessment and Plan of Treatment: PLEASE DO NOT MISS YOUR HEMODILAYSIS SESSIONS. PLEASE CONTINUE YOUR HEMODIALYSIS ON MONDAYS, WEDNESDAYS AND FRIDAYS. PLEASE FOLLOW WITH YOUR PRIMARY CARE PROVIDER.    Diagnosis: Sarcoidosis  Assessment and Plan of Treatment: PLEASE CONTINUE TO TAKE YOUR PREDNISONE 10 MG ONE TABLET PER DAY. PLEASE FOLLOW WITH YOUR PRIMARY CARE PROVIDER.  DO NOT STOP PREDNISONE WITHOUT YOUR PROVIDER KNOWLEDGE. ALSO PLEASE INFORM ANY OF YOUR PROVIDERS THAT YOU ARE TAKING PREDNICONE DAILY.    Diagnosis: History of diabetes mellitus  Assessment and Plan of Treatment: You had prior history of Diabetes and was Trulicity in the past. You reported not on any Diabetic meeications since being on Dilaysis. You are at risk of elevated sugars from being on prednisone for Sarcoidosis.   Your sugars were reunning 120 to 160s during hospital stay.   If sugars persistently above 150s you may need medications for Diabetes. Monitor sugars closely and A1c with PCP every 3 to 6 months.    Diagnosis: Diabetic neuropathy  Assessment and Plan of Treatment: Please continue with Gabapentin twice daily as before.     PRINCIPAL DISCHARGE DIAGNOSIS  Diagnosis: Acute respiratory failure with hypoxia  Assessment and Plan of Treatment: You came in with worsening shortness of breath. Your chest x ray was showing signs of excess fluid in the lungs. You were initially requiring oxygen. Nephrology was emergently consulted and you underwent dialysis on 3/4/24. Your oxygenation improved and you no longer were short of breath.You are saturating more than 95% on room air with ambulation.   Please ensure that you get full session of Dialysis and adequate fluid removed as  fluid build up also raises your Blood pressure which in turn can further worsen fluid build up.   PLEASE MAINTAIN YOUR HEMODIALYSIS SESSIONS AS INSTRUCTED (MONDAYS, WEDNESDAYS AND FRIDAYS). PLEASE DO NOT MISS ANY HEMODIALYSIS SESSIONS. PLEASE IF YOU DEVELOP SHORTNESS OF BREATH, CHEST PAIN PLEASE RETURN TO THE HOSPITAL. PLEASE FOLLOW WITH YOUR PRIMARY CARE PROVIDER.      SECONDARY DISCHARGE DIAGNOSES  Diagnosis: HTN (hypertension)  Assessment and Plan of Treatment: When you came into the hospital your blood pressure were severly elevated. This is due to multiple things because you were not dialyzed on your schedule day as an outpatient. You were urgently dialyzed on 3/4/24. PLEASE CONSUME A LOW SALT DIET. PLEASE CHECK YOUR BLOOD PRESSURE EVERYDAY. PLEASE CONTINUE TO TAKE HYDRALAZINE 50MG ONE TABLET THREE TIMES A DAY. PLEASE TAKE METOPROLOL ER 25MG ONE TABLET PER DAY AND NIFEDIPINE 60 MG ONE TABLET DAILY FOR YOUR BLOOD PRESSURE CONTORL.   YOUR BP IS WELL CONTROLLED WITH CURRENT REGIMEN AS WELL AS ADEQUATE DIALYSIS.   THESE MEDICATIONS WERE SENT TO YOUR PHARMACY AS REFILLS. PLEASE RETURN TO THE HOSPITAL IF YOU DEVELOP WORSENING BLOOD PRESSURE OVER 200's. PLEASE RETURN TO THE HOSPITAL IF YOU DEVELOP CHEST PAIN, SHORTNESS OF BREATH OR VISUAL CHANGES.    Diagnosis: ESRD on dialysis  Assessment and Plan of Treatment: PLEASE DO NOT MISS YOUR HEMODILAYSIS SESSIONS. PLEASE CONTINUE YOUR HEMODIALYSIS ON MONDAYS, WEDNESDAYS AND FRIDAYS. PLEASE FOLLOW WITH YOUR PRIMARY CARE PROVIDER.    Diagnosis: Sarcoidosis  Assessment and Plan of Treatment: PLEASE CONTINUE TO TAKE YOUR PREDNISONE 10 MG ONE TABLET PER DAY. PLEASE FOLLOW WITH YOUR PRIMARY CARE PROVIDER.  DO NOT STOP PREDNISONE WITHOUT YOUR PROVIDER KNOWLEDGE. ALSO PLEASE INFORM ANY OF YOUR PROVIDERS THAT YOU ARE TAKING PREDNICONE DAILY.    Diagnosis: Diabetic neuropathy  Assessment and Plan of Treatment: Please continue with Gabapentin twice daily as before.    Diagnosis: History of diabetes mellitus  Assessment and Plan of Treatment: You had prior history of Diabetes and was Trulicity in the past. You reported not on any Diabetic meeications since being on Dilaysis. You are at risk of elevated sugars from being on prednisone for Sarcoidosis.   Your sugars were reunning 120 to 160s during hospital stay.   If sugars persistently above 150s you may need medications for Diabetes. Monitor sugars closely and A1c with PCP every 3 to 6 months.    Diagnosis: Vitamin D deficiency  Assessment and Plan of Treatment: You have to continue to take your Vitamin D supplement 50mcg one tablet every 2 weeks.    Diagnosis: Statin intolerance  Assessment and Plan of Treatment: You have risk factors for cardiovascular events. You were taken off your statin therapy due to muscle aches in the legs. However, due to your increased risk factors you were prescribed Rosuvastatin 10mg one tablet before bedtime. PLEASE CONTINUE WITH THIS MEDICATION IF YOU DEVELOP LEG PAINS PLEASE FOLLOW WITH YOUR PRIMARY CARE PROVIDER.     PRINCIPAL DISCHARGE DIAGNOSIS  Diagnosis: Acute respiratory failure with hypoxia  Assessment and Plan of Treatment: You came in with worsening shortness of breath. Your chest x ray was showing signs of excess fluid in the lungs. You were initially requiring oxygen. Nephrology was emergently consulted and you underwent dialysis on 3/4/24. Your oxygenation improved and you no longer were short of breath.You are saturating more than 95% on room air with ambulation.   Please ensure that you get full session of Dialysis and adequate fluid removed as  fluid build up also raises your Blood pressure which in turn can further worsen fluid build up.   PLEASE MAINTAIN YOUR HEMODIALYSIS SESSIONS AS INSTRUCTED (MONDAYS, WEDNESDAYS AND FRIDAYS). PLEASE DO NOT MISS ANY HEMODIALYSIS SESSIONS. PLEASE IF YOU DEVELOP SHORTNESS OF BREATH, CHEST PAIN PLEASE RETURN TO THE HOSPITAL. PLEASE FOLLOW WITH YOUR PRIMARY CARE PROVIDER.      SECONDARY DISCHARGE DIAGNOSES  Diagnosis: HTN (hypertension)  Assessment and Plan of Treatment: When you came into the hospital your blood pressure were severly elevated. This is due to multiple things because you were not dialyzed on your schedule day as an outpatient. You were urgently dialyzed on 3/4/24. PLEASE CONSUME A LOW SALT DIET. PLEASE CHECK YOUR BLOOD PRESSURE EVERYDAY. PLEASE CONTINUE TO TAKE HYDRALAZINE 50MG ONE TABLET THREE TIMES A DAY. PLEASE TAKE METOPROLOL ER 25MG ONE TABLET PER DAY AND NIFEDIPINE 60 MG ONE TABLET DAILY FOR YOUR BLOOD PRESSURE CONTORL.   YOUR BP IS WELL CONTROLLED WITH CURRENT REGIMEN AS WELL AS ADEQUATE DIALYSIS.   THESE MEDICATIONS WERE SENT TO YOUR PHARMACY AS REFILLS. PLEASE RETURN TO THE HOSPITAL IF YOU DEVELOP WORSENING BLOOD PRESSURE OVER 200's. PLEASE RETURN TO THE HOSPITAL IF YOU DEVELOP CHEST PAIN, SHORTNESS OF BREATH OR VISUAL CHANGES.    Diagnosis: ESRD on dialysis  Assessment and Plan of Treatment: PLEASE DO NOT MISS YOUR HEMODILAYSIS SESSIONS. PLEASE CONTINUE YOUR HEMODIALYSIS ON MONDAYS, WEDNESDAYS AND FRIDAYS. PLEASE FOLLOW WITH YOUR PRIMARY CARE PROVIDER.    Diagnosis: Sarcoidosis  Assessment and Plan of Treatment: PLEASE CONTINUE TO TAKE YOUR PREDNISONE 10 MG ONE TABLET PER DAY. PLEASE FOLLOW WITH YOUR PRIMARY CARE PROVIDER.  DO NOT STOP PREDNISONE WITHOUT YOUR PROVIDER KNOWLEDGE. ALSO PLEASE INFORM ANY OF YOUR PROVIDERS THAT YOU ARE TAKING PREDNICONE DAILY.    Diagnosis: Diabetic neuropathy  Assessment and Plan of Treatment: Please continue with Gabapentin twice daily as before.    Diagnosis: History of diabetes mellitus  Assessment and Plan of Treatment: You had prior history of Diabetes and was Trulicity in the past. You reported not on any Diabetic meeications since being on Dilaysis. You are at risk of elevated sugars from being on prednisone for Sarcoidosis.   Your sugars were reunning 120 to 160s during hospital stay.   If sugars persistently above 150s you may need medications for Diabetes. Monitor sugars closely and A1c with PCP every 3 to 6 months.    Diagnosis: Vitamin D deficiency  Assessment and Plan of Treatment: You have to continue to take your Vitamin D supplement 50mcg one tablet every 2 weeks.    Diagnosis: Statin intolerance  Assessment and Plan of Treatment: You have risk factors for cardiovascular events. You were taken off your statin therapy due to muscle aches in the legs. However, due to your increased risk factors you were prescribed Rosuvastatin 10mg one tablet before bedtime, however this medication was not covered so you were prescribed Lipitor 10mg one tablet per day. bPLEASE CONTINUE WITH THIS MEDICATION IF YOU DEVELOP LEG PAINS PLEASE FOLLOW WITH YOUR PRIMARY CARE PROVIDER.

## 2024-03-05 NOTE — DISCHARGE NOTE PROVIDER - NSDCFUSCHEDAPPT_GEN_ALL_CORE_FT
[facility] Physician [facility]  INTMED  [facility]   Scheduled Appointment: 03/11/2024    Rosie Fagan  [facility] Physician [facility]  [place]CR[address] [facility]  Scheduled Appointment: 04/30/2024

## 2024-03-05 NOTE — PROGRESS NOTE ADULT - PROBLEM SELECTOR PLAN 1
Pt p/w SOB   - SBP in 230's   - pBNP :>[id number]  - CXR showing increased effusions bilaterally since Jan all concerning for AHRF secondary to fluid overload/flash pulmonary edema   - s/p HD Monday 3/4  - c/w supplemental O2, wean as tolerated  - Nephro consulted, Dr. Estrada   - Social Work Consult

## 2024-03-05 NOTE — PROGRESS NOTE ADULT - PROBLEM SELECTOR PLAN 5
Pt w pmh of HTN p/w SBP in 230's  - c/w home meds Hydralazine 50mg tid, Lopressor 12.5 bid (pt on home toprol 25mg qd), and Nifedipine 60mg qd  - Monitor BP to ensure improvement at acceptable pace

## 2024-03-05 NOTE — PROGRESS NOTE ADULT - PROBLEM SELECTOR PLAN 3
Pt w pmh of ESRD on HD MWF  - s/p HD Monday 3/4  - c/w [id number] tid  - Nephro consulted, Dr. Estrada

## 2024-03-05 NOTE — DISCHARGE NOTE PROVIDER - ATTENDING ATTESTATION STATEMENT
Explained to patient that Dr Snyder believes the arm discoloration could be cause by the steroid use also.   I have personally seen and examined the patient. I have collaborated with and supervised the

## 2024-03-05 NOTE — DISCHARGE NOTE PROVIDER - CARE PROVIDER_API CALL
Balbir Das  Nephrology  [address] [place] [id number]  Phone: [phone]  Fax: [phone]  Follow Up Time:

## 2024-03-05 NOTE — DISCHARGE NOTE PROVIDER - HOSPITAL COURSE
53F from home ambulates independently, with PMH of HTN, DM2, ESRD on HD M/W/F (last session 3/1 makes minimal urine), sarcoidosis on prednisone, anemia, seizure disorder (no longer on meds) who presented to the ED for shortness of breath. CXR showing increased effusions bilaterally since Jan all concerning for AHRF secondary to fluid overload in the setting of flash pulmonary edema        Acute hypoxic respiratory failure:  p/w SOB after exertion, SBP Consistently in 230's systolic and in the setting of increased exertion may have lead to increased afterload on heart lying in bed BNP :>[id number].CXR showing increased effusions bilaterally since Jan all concerning for AHRF secondary to fluid overload/flash pulmonary edema   Patient dialyzed on 3/4. Oxygen titrated down to room air. Patient improved with HD. Patient needs to f/u with nephrology as outpatient for HD.      ESRD on dialysis: Nephrology Dr. Estrada consulted and HD was done 3/4/24.     Sarcoidosis: Cont Prednisone 10mg daily.     HTN (hypertension): patient managed with hydralazine, metoprolol and nifedipine.     DM (diabetes mellitus): Managed with Lantus and SSI.     Left paraspinal back pain: Managed with lidocaine and acetaminophen for break through pain.      This is just a brief medial summary. For further details please see medical chart. Case discussed with medical attending.

## 2024-03-10 ENCOUNTER — INPATIENT (INPATIENT)
Facility: HOSPITAL | Age: 54
LOS: 2 days | Discharge: HOME | DRG: 640 | End: 2024-03-13
Attending: STUDENT IN AN ORGANIZED HEALTH CARE EDUCATION/TRAINING PROGRAM | Admitting: STUDENT IN AN ORGANIZED HEALTH CARE EDUCATION/TRAINING PROGRAM
Payer: MEDICARE

## 2024-03-10 VITALS
TEMPERATURE: 99 F | RESPIRATION RATE: 22 BRPM | HEIGHT: 60 IN | SYSTOLIC BLOOD PRESSURE: 231 MMHG | OXYGEN SATURATION: 80 % | DIASTOLIC BLOOD PRESSURE: 124 MMHG | HEART RATE: 118 BPM | WEIGHT: 102.96 LBS

## 2024-03-10 DIAGNOSIS — R06.02 SHORTNESS OF BREATH: ICD-10-CM

## 2024-03-10 LAB
ALBUMIN SERPL ELPH-MCNC: 2.9 G/DL — LOW (ref 3.5–5)
ALP SERPL-CCNC: 88 U/L — SIGNIFICANT CHANGE UP (ref 40–120)
ALT FLD-CCNC: 31 U/L DA — SIGNIFICANT CHANGE UP (ref 10–60)
ANION GAP SERPL CALC-SCNC: 6 MMOL/L — SIGNIFICANT CHANGE UP (ref 5–17)
APTT BLD: 32.2 SEC — SIGNIFICANT CHANGE UP (ref 24.5–35.6)
AST SERPL-CCNC: 33 U/L — SIGNIFICANT CHANGE UP (ref 10–40)
BASOPHILS # BLD AUTO: 0.05 K/UL — SIGNIFICANT CHANGE UP (ref 0–0.2)
BASOPHILS NFR BLD AUTO: 0.6 % — SIGNIFICANT CHANGE UP (ref 0–2)
BILIRUB SERPL-MCNC: 0.4 MG/DL — SIGNIFICANT CHANGE UP (ref 0.2–1.2)
BUN SERPL-MCNC: 43 MG/DL — HIGH (ref 7–18)
CALCIUM SERPL-MCNC: 9.2 MG/DL — SIGNIFICANT CHANGE UP (ref 8.4–10.5)
CHLORIDE SERPL-SCNC: 105 MMOL/L — SIGNIFICANT CHANGE UP (ref 96–108)
CO2 SERPL-SCNC: 26 MMOL/L — SIGNIFICANT CHANGE UP (ref 22–31)
CREAT SERPL-MCNC: 6.42 MG/DL — HIGH (ref 0.5–1.3)
EGFR: 7 ML/MIN/1.73M2 — LOW
EOSINOPHIL # BLD AUTO: 0.41 K/UL — SIGNIFICANT CHANGE UP (ref 0–0.5)
EOSINOPHIL NFR BLD AUTO: 4.8 % — SIGNIFICANT CHANGE UP (ref 0–6)
FLUAV AG NPH QL: SIGNIFICANT CHANGE UP
FLUBV AG NPH QL: SIGNIFICANT CHANGE UP
GLUCOSE SERPL-MCNC: 162 MG/DL — HIGH (ref 70–99)
HCG SERPL-ACNC: <1 MIU/ML — SIGNIFICANT CHANGE UP
HCT VFR BLD CALC: 36.1 % — SIGNIFICANT CHANGE UP (ref 34.5–45)
HGB BLD-MCNC: 11.8 G/DL — SIGNIFICANT CHANGE UP (ref 11.5–15.5)
IMM GRANULOCYTES NFR BLD AUTO: 0.5 % — SIGNIFICANT CHANGE UP (ref 0–0.9)
INR BLD: 0.89 RATIO — SIGNIFICANT CHANGE UP (ref 0.85–1.18)
LIDOCAIN IGE QN: 115 U/L — HIGH (ref 13–75)
LYMPHOCYTES # BLD AUTO: 1.53 K/UL — SIGNIFICANT CHANGE UP (ref 1–3.3)
LYMPHOCYTES # BLD AUTO: 17.7 % — SIGNIFICANT CHANGE UP (ref 13–44)
MAGNESIUM SERPL-MCNC: 2.6 MG/DL — SIGNIFICANT CHANGE UP (ref 1.6–2.6)
MCHC RBC-ENTMCNC: 30.5 PG — SIGNIFICANT CHANGE UP (ref 27–34)
MCHC RBC-ENTMCNC: 32.7 GM/DL — SIGNIFICANT CHANGE UP (ref 32–36)
MCV RBC AUTO: 93.3 FL — SIGNIFICANT CHANGE UP (ref 80–100)
MONOCYTES # BLD AUTO: 0.53 K/UL — SIGNIFICANT CHANGE UP (ref 0–0.9)
MONOCYTES NFR BLD AUTO: 6.1 % — SIGNIFICANT CHANGE UP (ref 2–14)
NEUTROPHILS # BLD AUTO: 6.07 K/UL — SIGNIFICANT CHANGE UP (ref 1.8–7.4)
NEUTROPHILS NFR BLD AUTO: 70.3 % — SIGNIFICANT CHANGE UP (ref 43–77)
NRBC # BLD: 0 /100 WBCS — SIGNIFICANT CHANGE UP (ref 0–0)
NT-PROBNP SERPL-SCNC: HIGH PG/ML (ref 0–125)
PLATELET # BLD AUTO: 300 K/UL — SIGNIFICANT CHANGE UP (ref 150–400)
POTASSIUM SERPL-MCNC: 5.2 MMOL/L — SIGNIFICANT CHANGE UP (ref 3.5–5.3)
POTASSIUM SERPL-SCNC: 5.2 MMOL/L — SIGNIFICANT CHANGE UP (ref 3.5–5.3)
PROT SERPL-MCNC: 7.2 G/DL — SIGNIFICANT CHANGE UP (ref 6–8.3)
PROTHROM AB SERPL-ACNC: 10.2 SEC — SIGNIFICANT CHANGE UP (ref 9.5–13)
RBC # BLD: 3.87 M/UL — SIGNIFICANT CHANGE UP (ref 3.8–5.2)
RBC # FLD: 15 % — HIGH (ref 10.3–14.5)
SARS-COV-2 RNA SPEC QL NAA+PROBE: SIGNIFICANT CHANGE UP
SODIUM SERPL-SCNC: 137 MMOL/L — SIGNIFICANT CHANGE UP (ref 135–145)
TROPONIN I, HIGH SENSITIVITY RESULT: 102.9 NG/L — HIGH
WBC # BLD: 8.63 K/UL — SIGNIFICANT CHANGE UP (ref 3.8–10.5)
WBC # FLD AUTO: 8.63 K/UL — SIGNIFICANT CHANGE UP (ref 3.8–10.5)

## 2024-03-10 PROCEDURE — 99285 EMERGENCY DEPT VISIT HI MDM: CPT

## 2024-03-10 PROCEDURE — 93010 ELECTROCARDIOGRAM REPORT: CPT

## 2024-03-10 PROCEDURE — 99223 1ST HOSP IP/OBS HIGH 75: CPT | Mod: GC

## 2024-03-10 PROCEDURE — 71045 X-RAY EXAM CHEST 1 VIEW: CPT | Mod: 26

## 2024-03-10 RX ORDER — HYDRALAZINE HCL 50 MG
10 TABLET ORAL ONCE
Refills: 0 | Status: COMPLETED | OUTPATIENT
Start: 2024-03-10 | End: 2024-03-10

## 2024-03-10 RX ORDER — ACETAMINOPHEN 500 MG
975 TABLET ORAL ONCE
Refills: 0 | Status: COMPLETED | OUTPATIENT
Start: 2024-03-10 | End: 2024-03-10

## 2024-03-10 RX ORDER — METOPROLOL TARTRATE 50 MG
25 TABLET ORAL ONCE
Refills: 0 | Status: COMPLETED | OUTPATIENT
Start: 2024-03-10 | End: 2024-03-10

## 2024-03-10 RX ORDER — NIFEDIPINE 30 MG
60 TABLET, EXTENDED RELEASE 24 HR ORAL ONCE
Refills: 0 | Status: COMPLETED | OUTPATIENT
Start: 2024-03-10 | End: 2024-03-10

## 2024-03-10 RX ADMIN — Medication 10 MILLIGRAM(S): at 18:44

## 2024-03-10 RX ADMIN — Medication 25 MILLIGRAM(S): at 21:06

## 2024-03-10 RX ADMIN — Medication 60 MILLIGRAM(S): at 21:06

## 2024-03-10 RX ADMIN — Medication 975 MILLIGRAM(S): at 22:56

## 2024-03-10 NOTE — H&P ADULT - ATTENDING COMMENTS
IMAGING  Chest X-Ray  Pending official read; on my read there is bilateral increased vascular markings/congestive changes slightly improved when compared to prior chest x-ray from 3/4/2024. No effusions b/l.    EKG  Sinus Tachycardia, 108bpm, QTc 447ms, LA 178ms, on my read no ST segment elevation or depression, TWI in leads I, aVL, V1 are new when compared to prior EKG from 3/4/2024.    HPI  53 year old female patient with pmhx HTN, DM2, ESRD on HD M/W/F (last session 3/1 makes minimal urine), sarcoidosis on prednisone, anemia, seizure disorder (no longer on meds) who presented to the ER due to shortness of breath that started at 3am and found to have elevated blood pressure 231/124 in the ER.  Patient was recently discharged from [facility] for the same symptoms. Her last dialysis session was Friday which she was able to complete the full 3 hours. She is compliant with her blood pressure medications but missed her dose on Sunday as she did not want to drink any water with her shortness of breath, and she takes her medications with water. She does not check her blood pressure at home; but when she came to the ER her blood pressure was 231/124. The patient normally has systolic blood pressures in the 230s while at dialysis. When examined at bedside she feels her shortness of breath has improved.    Review Of Systems included: + shortness of breath, + cough, + low back pain, + headache,   no chest pain, no fever, no chills, no diarrhea, no constipation, no numbness, no weakness, no palpitations, no abdominal pain, no lightheadedness    Physical Exam  General: Awake, Alert, Oriented  Cardiac: RRR  Pulmonary: Crackles at bases b/l  Abdominal: Soft, ND, NT  Extremities: No edema b/l    A/P  # Fluid Overload  # HTN Crisis  # Acute Hypoxia  # Hx of ESRD on HD M/W/F  80% O2 Saturation on room air improved to 99% on 2 lpm nasal cannula  last HD on Friday completed fully (3 hours)  BNP > 104k  Blood pressure 231/124 likely causing pulmonary edema due to high afterload  Goal of 25% reduction in blood pressure in first 24 hours with gradual reduction thereafter  Systolic blood pressure goal of 175 in first 24 hours  - Consult Nephrology  - Continue home antihypertensive medications Amlodipine, Losartan, Hydralazine  - Next Hemodialysis would be for Monday    # Elevated Troponin 2/2 High Afterload and ESRD  Troponin 102.9 in the ER  - Follow up repeat Troponin    # Hx of DM  Blood Glucose of 162 in the ER  - Insulin Sliding Scale  - Blood Glucose Monitoring  - Can monitor blood glucose for 24 hours before starting long acting insulin if needed    # Hx of Sarcoidosis  - Continue home medication Prednisone    # DVT PPx  - Heparin    # FEN  - Diabetic Renal Diet  - Monitor and replete electrolytes as needed    Previous Admissions Included  3/4/2024 - 3/5/2024: AHRF 2/2 Flash Pulmonary Edema. BP 230s systolic.  2/9/2024 - 2/10/2024: Sepsis 2/2 Influenza and Diarrhea  1/5/2024 - 1/7/2024: Hyperkalemia, Fluid Overload, Positive for rhinovirus  12/27/2023 - 12/29/2023: Hypertensive 205/111 with missed dialysis sessions.    Patient case and management was discussed with ER Attending  I did examine all labs, imaging, prior notes IMAGING  Chest X-Ray  Pending official read; on my read there is bilateral increased vascular markings/congestive changes slightly improved when compared to prior chest x-ray from 3/4/2024. No effusions b/l.    EKG  Sinus Tachycardia, 108bpm, QTc 447ms, NH 178ms, on my read no ST segment elevation or depression, TWI in leads I, aVL, V1 are new when compared to prior EKG from 3/4/2024.    HPI  53 year old female patient with pmhx HTN, DM2, ESRD on HD M/W/F (last session 3/1 makes minimal urine), sarcoidosis on prednisone, anemia, seizure disorder (no longer on meds) who presented to the ER due to shortness of breath that started at 3am and found to have elevated blood pressure 231/124 in the ER.  Patient was recently discharged from [facility] for the same symptoms. Her last dialysis session was Friday which she was able to complete the full 3 hours. She is compliant with her blood pressure medications but missed her dose on Sunday as she did not want to drink any water with her shortness of breath, and she takes her medications with water. She does not check her blood pressure at home; but when she came to the ER her blood pressure was 231/124. The patient normally has systolic blood pressures in the 230s while at dialysis. When examined at bedside she feels her shortness of breath has improved.    Review Of Systems included: + shortness of breath, + cough, + low back pain, + headache,   no chest pain, no fever, no chills, no diarrhea, no constipation, no numbness, no weakness, no palpitations, no abdominal pain, no lightheadedness    Physical Exam  General: Awake, Alert, Oriented  Cardiac: RRR  Pulmonary: Crackles at bases b/l  Abdominal: Soft, ND, NT  Extremities: No edema b/l    A/P  # Fluid Overload  # HTN Crisis  # Acute Hypoxia  # Hx of ESRD on HD M/W/F  80% O2 Saturation on room air improved to 99% on 2 lpm nasal cannula  last HD on Friday completed fully (3 hours)  BNP > 104k  Blood pressure 231/124 likely causing pulmonary edema due to high afterload  Goal of 25% reduction in blood pressure in first 24 hours with gradual reduction thereafter  Systolic blood pressure goal of 175 in first 24 hours  - Lasix 80mg IV  - Consult Nephrology  - Continue home antihypertensive medications Amlodipine, Losartan, Hydralazine  - Next Hemodialysis would be for Monday    # Elevated Troponin 2/2 High Afterload and ESRD  Troponin 102.9 in the ER  - Follow up repeat Troponin    # Hx of DM  Blood Glucose of 162 in the ER  - Insulin Sliding Scale  - Blood Glucose Monitoring  - Can monitor blood glucose for 24 hours before starting long acting insulin if needed    # Hx of Sarcoidosis  - Continue home medication Prednisone    # DVT PPx  - Heparin    # FEN  - Diabetic Renal Diet  - Monitor and replete electrolytes as needed    Previous Admissions Included  3/4/2024 - 3/5/2024: AHRF 2/2 Flash Pulmonary Edema. BP 230s systolic.  2/9/2024 - 2/10/2024: Sepsis 2/2 Influenza and Diarrhea  1/5/2024 - 1/7/2024: Hyperkalemia, Fluid Overload, Positive for rhinovirus  12/27/2023 - 12/29/2023: Hypertensive 205/111 with missed dialysis sessions.    Patient case and management was discussed with ER Attending  I did examine all labs, imaging, prior notes

## 2024-03-10 NOTE — ED PROVIDER NOTE - CLINICAL SUMMARY MEDICAL DECISION MAKING FREE TEXT BOX
53-year-old female presenting with shortness of breath.  Patient not normally on oxygen but dropped to 89% on room air and is now on 4 L nasal cannula.  Patient hypertensive on ED arrival.  Concern for flash pulmonary edema.  Chart review shows patient was recently admitted to the hospital for similar symptoms.  Will likely require admission for fluid reduction, blood pressure management and dialysis.

## 2024-03-10 NOTE — H&P ADULT - PROBLEM/PLAN-4
Last OV 2/8/2021   Next OV Visit date not found  Last filled 5/27/2021    Requested Prescriptions     Pending Prescriptions Disp Refills    atorvastatin (LIPITOR) 20 MG tablet [Pharmacy Med Name: ATORVASTATIN CALCIUM 20MG TABS] 90 tablet 0     Sig: TAKE 1 TABLET BY MOUTH ONE TIME A DAY DISPLAY PLAN FREE TEXT

## 2024-03-10 NOTE — H&P ADULT - NSHPREVIEWOFSYSTEMS_GEN_ALL_CORE
REVIEW OF SYSTEMS:    CONSTITUTIONAL: No weakness, fevers or chills  EYES/ENT: No visual changes;  No vertigo or throat pain   NECK: No pain or stiffness  RESPIRATORY: No cough, wheezing, hemoptysis; (+) shortness of breath  CARDIOVASCULAR: No chest pain or palpitations  GASTROINTESTINAL: No abdominal or epigastric pain. No nausea, vomiting, or hematemesis; No diarrhea or constipation. No melena or hematochezia.  GENITOURINARY: No dysuria, frequency or hematuria  NEUROLOGICAL: No numbness or weakness  SKIN: No itching, burning, rashes, or lesions   All other review of systems is negative unless indicated above.

## 2024-03-10 NOTE — H&P ADULT - HISTORY OF PRESENT ILLNESS
Pt is a 53F from home ambulates independently, with PMH of HTN, DM2, ESRD on HD M/W/F (last session 3/1 makes minimal urine), sarcoidosis on prednisone, anemia, seizure disorder (no longer on meds) who presented to the ED for shortness of breath. Patient was recently admitted last week on 3/04, admitted for the same complaint and improved with 1 HD session and discharged. Patient states she has been having shortness of breath today, feels like there is "fluid in her lungs" with ENGILSH on minor exertion and orthopnea. Patient stated that she does not miss any of her HD sessions, and that she has been watching her diet/salt intake/fluid intake. Pt feels hopeless, since she has been admitted multiple times prior for the same reason and does not understand the reason why. Pt wants to have home O2, was had an appointment tomorrow with her PCP. Denies cough, fevers/chills, sick contacts, chest pain, palpitations, n/v/d/constipation, abdominal pain, dysuria, hematuria. Endorses mild ankle swelling.

## 2024-03-10 NOTE — H&P ADULT - ASSESSMENT
Pt is a 53F from home ambulates independently, with PMH of HTN, DM2, ESRD on HD M/W/F (last session 3/1 makes minimal urine), sarcoidosis on prednisone, anemia, seizure disorder (no longer on meds) who presented to the ED for shortness of breath. Vitals sig for hypoxia to 80% on RA > % on 3 L NC,/124 > 160s systolic s/p medications. Labs sig for BUN/Cr 43/6.42, trop 102.9,pro-BNP 104k, cov/flu negative. CXR shows interstitial infiltrates b/l, improved from prior CXR 3/04. s/p hydralazine 10 x1 ivp, lopressor 25 PO, and procardia 60mg PO. Admitted for AHRF secondary to fluid overload in the setting of flash pulmonary edema.

## 2024-03-10 NOTE — H&P ADULT - PROBLEM SELECTOR PLAN 2
- pt had multiple re-admissions in the setting of hypertensive emergency, flash-pulmonary edema, fluid overload in the setting of ESRD req. HD   - s/p hydralazine 10 x1 ivp, lopressor 25 PO, and procardia 60mg PO.   - HD by Nephro Dr. Estrada, will re-instate for 3/11 HD   - c/w home dose hypertensive mediations  - Goal BP reduction 25%

## 2024-03-10 NOTE — H&P ADULT - NSHPPHYSICALEXAM_GEN_ALL_CORE
T(C): 37.3 (03-10-24 @ 23:54), Max: 37.3 (03-10-24 @ 23:54)  HR: 94 (03-10-24 @ 23:54) (87 - 118)  BP: 179/95 (03-10-24 @ 23:54) (166/99 - 231/124)  RR: 18 (03-10-24 @ 23:54) (18 - 22)  SpO2: 97% (03-10-24 @ 23:54) (80% - 99%)    CONSTITUTIONAL: Well groomed, no apparent distress on 3 L NC   EYES: PERRLA and symmetric, EOMI, No conjunctival or scleral injection, non-icteric  ENMT: Oral mucosa with moist membranes. poor oral dentition  RESP: No respiratory distress, no use of accessory muscles; (+) inspiratory crackles auscultated posteriorly bilaterally   CV: RRR, +S1S2, no MRG; no JVD; (+) trace peripheral pitting edema , right subclavian catheter in place, site intact   GI: Soft, NT, ND, no rebound, no guarding; no palpable masses; no hepatosplenomegaly; no hernia palpated  MSK: Normal ROM without pain, no spinal tenderness, normal muscle strength/tone  SKIN: No rashes or ulcers noted; no subcutaneous nodules or induration palpable  NEURO: CN II-XII intact; normal reflexes in upper and lower extremities, sensation intact in upper and lower extremities b/l to light touch   PSYCH: Appropriate insight/judgment; A+O x 3, mood and affect appropriate, recent/remote memory intact

## 2024-03-10 NOTE — H&P ADULT - PROBLEM SELECTOR PLAN 1
-  presented to the ED for shortness of breath. w/ multiple re-admissions in the setting of hypertensive emergency, flash-pulmonary edema, fluid overload in the setting of ESRD req. HD   -  Vitals sig for hypoxia to 80% on RA > % on 3 L NC,/124 > 160s systolic s/p medications.   - Labs sig for BUN/Cr 43/6.42, trop 102.9,pro-BNP 104k, cov/flu negative.   - EKG- sinus tachycardia to 108 bpm   - CXR shows interstitial infiltrates b/l, improved from prior CXR 3/04.   - s/p hydralazine 10 x1 ivp, lopressor 25 PO, and procardia 60mg PO.   - Admitted for AHRF secondary to fluid overload in the setting of flash pulmonary edema.   - HD by Nephro Dr. Estrada, will re-instate for 3/11 HD   - consider increase dose hypertensive mediations - procardia 60mg > 90mg if HD does not improve BP to goal (avoid re-admission)  - might potentially need a 4th day of HD (since pt usually have issues on Sunday prior to HD session on Monday)  - for now c.w home regimen for BP   - O2 PRN

## 2024-03-10 NOTE — ED PROVIDER NOTE - OBJECTIVE STATEMENT
53F, pmh of HTN, DM2, ESRD on HD M/W/F (last dialysis two days ago), Presenting with shortness of breath.  Patient reports symptoms began last night.  Denies any headache, chest pain, fever, nausea or vomiting.  Feels like she again has fluid on her lungs.  Has not yet followed up with any of her doctors since her last discharge.  Had an appointment with her PCP tomorrow.  Is not on oxygen at baseline.

## 2024-03-10 NOTE — ED ADULT NURSE NOTE - NSFALLRISKINTERV_ED_ALL_ED

## 2024-03-10 NOTE — ED PROVIDER NOTE - PHYSICAL EXAMINATION
General: uncomfortable appearing female, no acute distress   HEENT: normocephalic, atraumatic   Respiratory: increased work of breathing, crackles in bilateral bases   Cardiac: tachycardic   Abdomen: soft, non-tender, no guarding or rebound   MSK: no swelling or tenderness of lower extremities, moving all extremities spontaneously   Skin: warm, dry   Neuro: A&Ox3  Psych: appropriate affect

## 2024-03-10 NOTE — ED ADULT NURSE NOTE - OBJECTIVE STATEMENT
Pt c/o shortness of breath since 3am today. Denies any chest pain. On Dialysis M-W-F. Right side chest perma cath noted.

## 2024-03-10 NOTE — H&P ADULT - PROBLEM SELECTOR PLAN 7
RN break coverage: Pt alert and active, no obvious pain or discomfort noted. Pt given 0.5mg of Risperdal. Mother states patient takes 1mg, MD Bynum aware, MD clarifying with private physician to confirm dose. Mother aware. Awaiting disposition/ plan of care. - c/w prednisone 10mg qd

## 2024-03-11 ENCOUNTER — TRANSCRIPTION ENCOUNTER (OUTPATIENT)
Age: 54
End: 2024-03-11

## 2024-03-11 ENCOUNTER — APPOINTMENT (OUTPATIENT)
Dept: INTERNAL MEDICINE | Facility: CLINIC | Age: 54
End: 2024-03-11

## 2024-03-11 DIAGNOSIS — Z29.9 ENCOUNTER FOR PROPHYLACTIC MEASURES, UNSPECIFIED: ICD-10-CM

## 2024-03-11 DIAGNOSIS — I16.1 HYPERTENSIVE EMERGENCY: ICD-10-CM

## 2024-03-11 DIAGNOSIS — D86.9 SARCOIDOSIS, UNSPECIFIED: ICD-10-CM

## 2024-03-11 DIAGNOSIS — N18.6 END STAGE RENAL DISEASE: ICD-10-CM

## 2024-03-11 DIAGNOSIS — I10 ESSENTIAL (PRIMARY) HYPERTENSION: ICD-10-CM

## 2024-03-11 DIAGNOSIS — E11.9 TYPE 2 DIABETES MELLITUS WITHOUT COMPLICATIONS: ICD-10-CM

## 2024-03-11 DIAGNOSIS — E78.5 HYPERLIPIDEMIA, UNSPECIFIED: ICD-10-CM

## 2024-03-11 DIAGNOSIS — J96.01 ACUTE RESPIRATORY FAILURE WITH HYPOXIA: ICD-10-CM

## 2024-03-11 LAB
ALBUMIN SERPL ELPH-MCNC: 2.7 G/DL — LOW (ref 3.5–5)
ALP SERPL-CCNC: 79 U/L — SIGNIFICANT CHANGE UP (ref 40–120)
ALT FLD-CCNC: 29 U/L DA — SIGNIFICANT CHANGE UP (ref 10–60)
ANION GAP SERPL CALC-SCNC: 6 MMOL/L — SIGNIFICANT CHANGE UP (ref 5–17)
AST SERPL-CCNC: 24 U/L — SIGNIFICANT CHANGE UP (ref 10–40)
BASOPHILS # BLD AUTO: 0.03 K/UL — SIGNIFICANT CHANGE UP (ref 0–0.2)
BASOPHILS NFR BLD AUTO: 0.4 % — SIGNIFICANT CHANGE UP (ref 0–2)
BILIRUB SERPL-MCNC: 0.5 MG/DL — SIGNIFICANT CHANGE UP (ref 0.2–1.2)
BUN SERPL-MCNC: 48 MG/DL — HIGH (ref 7–18)
CALCIUM SERPL-MCNC: 9.1 MG/DL — SIGNIFICANT CHANGE UP (ref 8.4–10.5)
CHLORIDE SERPL-SCNC: 104 MMOL/L — SIGNIFICANT CHANGE UP (ref 96–108)
CO2 SERPL-SCNC: 27 MMOL/L — SIGNIFICANT CHANGE UP (ref 22–31)
CREAT SERPL-MCNC: 7.11 MG/DL — HIGH (ref 0.5–1.3)
EGFR: 6 ML/MIN/1.73M2 — LOW
EOSINOPHIL # BLD AUTO: 0.36 K/UL — SIGNIFICANT CHANGE UP (ref 0–0.5)
EOSINOPHIL NFR BLD AUTO: 5.3 % — SIGNIFICANT CHANGE UP (ref 0–6)
GLUCOSE BLDC GLUCOMTR-MCNC: 152 MG/DL — HIGH (ref 70–99)
GLUCOSE BLDC GLUCOMTR-MCNC: 179 MG/DL — HIGH (ref 70–99)
GLUCOSE BLDC GLUCOMTR-MCNC: 208 MG/DL — HIGH (ref 70–99)
GLUCOSE BLDC GLUCOMTR-MCNC: 248 MG/DL — HIGH (ref 70–99)
GLUCOSE BLDC GLUCOMTR-MCNC: 98 MG/DL — SIGNIFICANT CHANGE UP (ref 70–99)
GLUCOSE SERPL-MCNC: 154 MG/DL — HIGH (ref 70–99)
HCT VFR BLD CALC: 31.1 % — LOW (ref 34.5–45)
HGB BLD-MCNC: 10.2 G/DL — LOW (ref 11.5–15.5)
IMM GRANULOCYTES NFR BLD AUTO: 0.4 % — SIGNIFICANT CHANGE UP (ref 0–0.9)
LYMPHOCYTES # BLD AUTO: 1.46 K/UL — SIGNIFICANT CHANGE UP (ref 1–3.3)
LYMPHOCYTES # BLD AUTO: 21.5 % — SIGNIFICANT CHANGE UP (ref 13–44)
MAGNESIUM SERPL-MCNC: 2.6 MG/DL — SIGNIFICANT CHANGE UP (ref 1.6–2.6)
MCHC RBC-ENTMCNC: 30.1 PG — SIGNIFICANT CHANGE UP (ref 27–34)
MCHC RBC-ENTMCNC: 32.8 GM/DL — SIGNIFICANT CHANGE UP (ref 32–36)
MCV RBC AUTO: 91.7 FL — SIGNIFICANT CHANGE UP (ref 80–100)
MONOCYTES # BLD AUTO: 0.49 K/UL — SIGNIFICANT CHANGE UP (ref 0–0.9)
MONOCYTES NFR BLD AUTO: 7.2 % — SIGNIFICANT CHANGE UP (ref 2–14)
NEUTROPHILS # BLD AUTO: 4.41 K/UL — SIGNIFICANT CHANGE UP (ref 1.8–7.4)
NEUTROPHILS NFR BLD AUTO: 65.2 % — SIGNIFICANT CHANGE UP (ref 43–77)
NRBC # BLD: 0 /100 WBCS — SIGNIFICANT CHANGE UP (ref 0–0)
PHOSPHATE SERPL-MCNC: 6.3 MG/DL — HIGH (ref 2.5–4.5)
PLATELET # BLD AUTO: 240 K/UL — SIGNIFICANT CHANGE UP (ref 150–400)
POTASSIUM SERPL-MCNC: 5.3 MMOL/L — SIGNIFICANT CHANGE UP (ref 3.5–5.3)
POTASSIUM SERPL-SCNC: 5.3 MMOL/L — SIGNIFICANT CHANGE UP (ref 3.5–5.3)
PROT SERPL-MCNC: 6.3 G/DL — SIGNIFICANT CHANGE UP (ref 6–8.3)
RBC # BLD: 3.39 M/UL — LOW (ref 3.8–5.2)
RBC # FLD: 15.1 % — HIGH (ref 10.3–14.5)
SODIUM SERPL-SCNC: 137 MMOL/L — SIGNIFICANT CHANGE UP (ref 135–145)
TROPONIN I, HIGH SENSITIVITY RESULT: 90.2 NG/L — HIGH
WBC # BLD: 6.78 K/UL — SIGNIFICANT CHANGE UP (ref 3.8–10.5)
WBC # FLD AUTO: 6.78 K/UL — SIGNIFICANT CHANGE UP (ref 3.8–10.5)

## 2024-03-11 PROCEDURE — 99233 SBSQ HOSP IP/OBS HIGH 50: CPT | Mod: GC

## 2024-03-11 RX ORDER — ERYTHROPOIETIN 10000 [IU]/ML
4000 INJECTION, SOLUTION INTRAVENOUS; SUBCUTANEOUS
Refills: 0 | Status: DISCONTINUED | OUTPATIENT
Start: 2024-03-11 | End: 2024-03-13

## 2024-03-11 RX ORDER — METOPROLOL TARTRATE 50 MG
25 TABLET ORAL DAILY
Refills: 0 | Status: DISCONTINUED | OUTPATIENT
Start: 2024-03-11 | End: 2024-03-13

## 2024-03-11 RX ORDER — NIFEDIPINE 30 MG
60 TABLET, EXTENDED RELEASE 24 HR ORAL DAILY
Refills: 0 | Status: DISCONTINUED | OUTPATIENT
Start: 2024-03-11 | End: 2024-03-11

## 2024-03-11 RX ORDER — FERROUS SULFATE 325(65) MG
325 TABLET ORAL DAILY
Refills: 0 | Status: DISCONTINUED | OUTPATIENT
Start: 2024-03-11 | End: 2024-03-13

## 2024-03-11 RX ORDER — LANOLIN ALCOHOL/MO/W.PET/CERES
5 CREAM (GRAM) TOPICAL AT BEDTIME
Refills: 0 | Status: DISCONTINUED | OUTPATIENT
Start: 2024-03-11 | End: 2024-03-13

## 2024-03-11 RX ORDER — HEPARIN SODIUM 5000 [USP'U]/ML
5000 INJECTION INTRAVENOUS; SUBCUTANEOUS EVERY 12 HOURS
Refills: 0 | Status: DISCONTINUED | OUTPATIENT
Start: 2024-03-11 | End: 2024-03-13

## 2024-03-11 RX ORDER — GABAPENTIN 400 MG/1
300 CAPSULE ORAL
Refills: 0 | Status: DISCONTINUED | OUTPATIENT
Start: 2024-03-11 | End: 2024-03-13

## 2024-03-11 RX ORDER — LIDOCAINE 4 G/100G
1 CREAM TOPICAL ONCE
Refills: 0 | Status: COMPLETED | OUTPATIENT
Start: 2024-03-11 | End: 2024-03-11

## 2024-03-11 RX ORDER — HYDRALAZINE HCL 50 MG
50 TABLET ORAL THREE TIMES A DAY
Refills: 0 | Status: DISCONTINUED | OUTPATIENT
Start: 2024-03-11 | End: 2024-03-13

## 2024-03-11 RX ORDER — INSULIN LISPRO 100/ML
VIAL (ML) SUBCUTANEOUS
Refills: 0 | Status: DISCONTINUED | OUTPATIENT
Start: 2024-03-11 | End: 2024-03-13

## 2024-03-11 RX ORDER — ATORVASTATIN CALCIUM 80 MG/1
10 TABLET, FILM COATED ORAL AT BEDTIME
Refills: 0 | Status: DISCONTINUED | OUTPATIENT
Start: 2024-03-11 | End: 2024-03-13

## 2024-03-11 RX ORDER — SEVELAMER CARBONATE 2400 MG/1
1600 POWDER, FOR SUSPENSION ORAL THREE TIMES A DAY
Refills: 0 | Status: DISCONTINUED | OUTPATIENT
Start: 2024-03-11 | End: 2024-03-13

## 2024-03-11 RX ORDER — NIFEDIPINE 30 MG
60 TABLET, EXTENDED RELEASE 24 HR ORAL DAILY
Refills: 0 | Status: DISCONTINUED | OUTPATIENT
Start: 2024-03-11 | End: 2024-03-13

## 2024-03-11 RX ORDER — FUROSEMIDE 40 MG
80 TABLET ORAL ONCE
Refills: 0 | Status: COMPLETED | OUTPATIENT
Start: 2024-03-11 | End: 2024-03-11

## 2024-03-11 RX ADMIN — SEVELAMER CARBONATE 1600 MILLIGRAM(S): 2400 POWDER, FOR SUSPENSION ORAL at 05:41

## 2024-03-11 RX ADMIN — SEVELAMER CARBONATE 1600 MILLIGRAM(S): 2400 POWDER, FOR SUSPENSION ORAL at 22:19

## 2024-03-11 RX ADMIN — Medication 80 MILLIGRAM(S): at 05:41

## 2024-03-11 RX ADMIN — Medication 2: at 17:36

## 2024-03-11 RX ADMIN — Medication 60 MILLIGRAM(S): at 05:40

## 2024-03-11 RX ADMIN — LIDOCAINE 1 PATCH: 4 CREAM TOPICAL at 13:57

## 2024-03-11 RX ADMIN — LIDOCAINE 1 PATCH: 4 CREAM TOPICAL at 01:19

## 2024-03-11 RX ADMIN — LIDOCAINE 1 PATCH: 4 CREAM TOPICAL at 07:39

## 2024-03-11 RX ADMIN — HEPARIN SODIUM 5000 UNIT(S): 5000 INJECTION INTRAVENOUS; SUBCUTANEOUS at 05:41

## 2024-03-11 RX ADMIN — Medication 325 MILLIGRAM(S): at 14:32

## 2024-03-11 RX ADMIN — GABAPENTIN 300 MILLIGRAM(S): 400 CAPSULE ORAL at 05:41

## 2024-03-11 RX ADMIN — Medication 50 MILLIGRAM(S): at 22:19

## 2024-03-11 RX ADMIN — Medication 1: at 08:41

## 2024-03-11 RX ADMIN — HEPARIN SODIUM 5000 UNIT(S): 5000 INJECTION INTRAVENOUS; SUBCUTANEOUS at 17:40

## 2024-03-11 RX ADMIN — Medication 50 MILLIGRAM(S): at 05:40

## 2024-03-11 RX ADMIN — ERYTHROPOIETIN 4000 UNIT(S): 10000 INJECTION, SOLUTION INTRAVENOUS; SUBCUTANEOUS at 12:23

## 2024-03-11 RX ADMIN — Medication 975 MILLIGRAM(S): at 00:19

## 2024-03-11 RX ADMIN — SEVELAMER CARBONATE 1600 MILLIGRAM(S): 2400 POWDER, FOR SUSPENSION ORAL at 14:38

## 2024-03-11 RX ADMIN — GABAPENTIN 300 MILLIGRAM(S): 400 CAPSULE ORAL at 17:39

## 2024-03-11 RX ADMIN — Medication 50 MILLIGRAM(S): at 14:38

## 2024-03-11 RX ADMIN — Medication 25 MILLIGRAM(S): at 05:44

## 2024-03-11 RX ADMIN — ATORVASTATIN CALCIUM 10 MILLIGRAM(S): 80 TABLET, FILM COATED ORAL at 22:19

## 2024-03-11 RX ADMIN — Medication 10 MILLIGRAM(S): at 05:40

## 2024-03-11 NOTE — PROGRESS NOTE ADULT - PROBLEM SELECTOR PLAN 1
-  presented to the ED for shortness of breath. w/ multiple re-admissions in the setting of hypertensive emergency, flash-pulmonary edema, fluid overload in the setting of ESRD req. HD   -  Vitals sig for hypoxia to 80% on RA > % on 3 L NC,/124 > 160s systolic s/p medications.   - EKG- sinus tachycardia to 108 bpm   - trops 102-> 90.2 ( trended down)  - CXR shows interstitial infiltrates b/l, improved from prior CXR 3/04.   - s/p hydralazine 10 x1 ivp, lopressor 25 PO, and procardia 60mg PO.   - HD by Nephro Dr. Estrada, will re-instate for 3/11 HD   - consider increase dose hypertensive mediations - procardia 60mg > 90mg if HD does not improve BP to goal (avoid re-admission)  - might potentially need a 4th day of HD (since pt usually have issues on Sunday prior to HD session on Monday)  - for now c.w home regimen for BP   - O2 PRN -  presented to the ED for shortness of breath. w/ multiple re-admissions in the setting of hypertensive emergency, flash-pulmonary edema, fluid overload in the setting of ESRD req. HD   -  Vitals sig for hypoxia to 80% on RA > % on 3 L NC,/124 > 160s systolic s/p medications.   - EKG- sinus tachycardia to 108 bpm   - trops 102-> 90.2 ( trended down)  - CXR shows interstitial infiltrates b/l, improved from prior CXR 3/04.   - s/p hydralazine 10 x1 ivp, lopressor 25 PO, and procardia 60mg PO.   - HD by Nephro Dr. Estrada, will re-instate for 3/11 HD   -might consider adding lasix for Saturday to help with further dialysis.    - titrate oxygen   - O2 PRN

## 2024-03-11 NOTE — DISCHARGE NOTE PROVIDER - HOSPITAL COURSE
Pt is a 53F from home ambulates independently, with PMH of HTN, DM2, ESRD on HD M/W/F (last session 3/1 makes minimal urine), sarcoidosis on prednisone, anemia, seizure disorder (no longer on meds) who presented to the ED for shortness of breath. Vitals sig for hypoxia to 80% on RA > % on 3 L NC,/124 > 160s systolic s/p medications. Labs sig for BUN/Cr 43/6.42, trop 102.9,pro-BNP 104k, cov/flu negative. CXR shows interstitial infiltrates b/l, improved from prior CXR 3/04. s/p hydralazine 10 x1 ivp, lopressor 25 PO, and procardia 60mg PO. Admitted for AHRF secondary to fluid overload in the setting of flash pulmonary edema.  EKG- sinus tachycardia to 108 bpm. Trops  trended down. Underwent HD and patient was transitioned  off oxygen onto room air. BP went down to 125/78. Patient was discharged with PO lasix 80mg on the days she is not being dialyzed.      For her Diabetes, she was put on sliding and upon discharge she was continued on her home medication.     For her Hyperlipidemia, she was continued on statin.    For her Sarcoidosiss she was continued on prednisone 10mg qd.   Pt is a 53F from home ambulates independently, with PMH of HTN, DM2, ESRD on HD M/W/F (last session 3/1 makes minimal urine), sarcoidosis on prednisone, anemia, seizure disorder (no longer on meds) who presented to the ED for shortness of breath. Vitals sig for hypoxia to 80% on RA > % on 3 L NC,/124 > 160s systolic s/p medications. Labs sig for BUN/Cr 43/6.42, trop 102.9,pro-BNP 104k, cov/flu negative. CXR shows interstitial infiltrates b/l, improved from prior CXR 3/04. s/p hydralazine 10 x1 ivp, lopressor 25 PO, and procardia 60mg PO. Admitted for AHRF secondary to fluid overload in the setting of flash pulmonary edema.  EKG- sinus tachycardia to 108 bpm. Trops  trended down. Underwent HD and patient was transitioned  off oxygen onto room air. BP went down to 125/78. Patient was discharged with PO lasix 80mg on the days she is not being dialyzed. Her nifedipine drip was also increased to 90 mg.      For her Diabetes, she was put on sliding and upon discharge she was continued on her home medication.     For her Hyperlipidemia, she was continued on statin.    For her Sarcoidoses she was continued on prednisone 10mg qd.

## 2024-03-11 NOTE — PROGRESS NOTE ADULT - PROBLEM SELECTOR PLAN 2
- pt had multiple re-admissions in the setting of hypertensive emergency, flash-pulmonary edema, fluid overload in the setting of ESRD req. HD   - s/p hydralazine 10 x1 ivp, lopressor 25 PO, and procardia 60mg PO.   - HD by Nephro Dr. Estrada, will re-instate for 3/11 HD   - c/w home dose hypertensive mediations  - Current BP is 168/91  continue to monitor - pt had multiple re-admissions in the setting of hypertensive emergency, flash-pulmonary edema, fluid overload in the setting of ESRD req. HD   - s/p hydralazine 10 x1 ivp, lopressor 25 PO, and procardia 60mg PO.   - HD by Nephro Dr. Estrada, will re-instate for 3/11 HD   - c/w home dose hypertensive mediations  - Current BP is 125/78  continue to monitor

## 2024-03-11 NOTE — CONSULT NOTE ADULT - ASSESSMENT
Patient is a 53y Female whom presented to the hospital with shortness of breath. She has ESRD on HD at [facility]. as per her has not missed any HD. Also compliant with diet and fluid per her. In ED found with pulmonary edema.  s/p HD earlier this morning with improvement with SOB and BP.    # ESRD. s/p hemodialysis earlier today with improvement of symptoms.  Agree with lasix 80mg over the weekend on non-HD days. Also will conisder 4 days of HD as outpt.  # HTN. blood pressure improved with UF and resumption of BP meds.  # procrit on HD. cont epogen.

## 2024-03-11 NOTE — DISCHARGE NOTE PROVIDER - NSDCMRMEDTOKEN_GEN_ALL_CORE_FT
atorvastatin 10 mg oral tablet: 1 tab(s) orally once a day (at bedtime)  ergocalciferol 50 mcg (2000 intl units) oral capsule: 1 cap(s) orally every 7 days  ferrous sulfate 325 mg (65 mg elemental iron) oral tablet: 1 tab(s) orally once a day  gabapentin 300 mg oral capsule: 1 cap(s) orally 2 times a day  hydrALAZINE 50 mg oral tablet: 1 tab(s) orally 3 times a day  metoprolol succinate 25 mg oral tablet, extended release: 1 tab(s) orally once a day  NIFEdipine 60 mg oral tablet, extended release: 1 tab(s) orally once a day  predniSONE 10 mg oral tablet: 1 tab(s) orally once a day  sevelamer carbonate 800 mg oral tablet: 2 tab(s) orally 3 times a day   atorvastatin 10 mg oral tablet: 1 tab(s) orally once a day (at bedtime)  ergocalciferol 50 mcg (2000 intl units) oral capsule: 1 cap(s) orally every 7 days  ferrous sulfate 325 mg (65 mg elemental iron) oral tablet: 1 tab(s) orally once a day  gabapentin 300 mg oral capsule: 1 cap(s) orally 2 times a day  hydrALAZINE 50 mg oral tablet: 1 tab(s) orally 3 times a day  Lasix 80 mg oral tablet: 1 tab(s) orally 3 to 4 times a week Please take this medication Tuesday, Thursday, Saturday on the days you are not going for dialysis.  metoprolol succinate 25 mg oral tablet, extended release: 1 tab(s) orally once a day  NIFEdipine 90 mg oral tablet, extended release: 1 tab(s) orally once a day  predniSONE 10 mg oral tablet: 1 tab(s) orally once a day  sevelamer carbonate 800 mg oral tablet: 2 tab(s) orally 3 times a day

## 2024-03-11 NOTE — PATIENT PROFILE ADULT - FALL HARM RISK - HARM RISK INTERVENTIONS
Assistance with ambulation/Assistance OOB with selected safe patient handling equipment/Communicate Risk of Fall with Harm to all staff/Discuss with provider need for PT consult/Monitor gait and stability/Reinforce activity limits and safety measures with patient and family/Tailored Fall Risk Interventions/Visual Cue: Yellow wristband and red socks/Bed in lowest position, wheels locked, appropriate side rails in place/Call bell, personal items and telephone in reach/Instruct patient to call for assistance before getting out of bed or chair/Non-slip footwear when patient is out of bed/[place] to call system/Physically safe environment - no spills, clutter or unnecessary equipment/Purposeful Proactive Rounding/Room/bathroom lighting operational, light cord in reach

## 2024-03-11 NOTE — PATIENT PROFILE ADULT - PATIENT REPRESENTATIVE: ( YOU CAN CHOOSE ANY PERSON THAT CAN ASSIST YOU WITH YOUR HEALTH CARE PREFERENCES, DOES NOT HAVE TO BE A SPOUSE, IMMEDIATE FAMILY OR SIGNIFICANT OTHER/PARTNER)
Spoke with dad. Instructed to arrive for 11:30 to [facility] on 2nd floor of [facility] on 8/3.  Nothing to eat or drink after midnight the night before.    Also confirmed again for US the day before at 11am as planned.      Information sent to zane in mail at address confirmed on file.    declines

## 2024-03-11 NOTE — DISCHARGE NOTE PROVIDER - ATTENDING DISCHARGE PHYSICAL EXAMINATION:
Patient was seen and examined at bedside. Denies any complains     ICU Vital Signs Last 24 Hrs  T(C): 37.3 (13 Mar 2024 13:57), Max: 37.4 (12 Mar 2024 20:09)  T(F): 99.1 (13 Mar 2024 13:57), Max: 99.4 (12 Mar 2024 20:09)  HR: 96 (13 Mar 2024 13:57) (88 - 96)  BP: 179/87 (13 Mar 2024 13:57) (146/75 - 179/87)  BP(mean): --  ABP: --  ABP(mean): --  RR: 16 (13 Mar 2024 13:57) (16 - 18)  SpO2: 98% (13 Mar 2024 13:57) (94% - 100%)    O2 Parameters below as of 13 Mar 2024 13:57  Patient On (Oxygen Delivery Method): room air    P/E:  NAD  AAOx3, no focal deficit   CTABL  S1S2 WNL  Abd soft, non tender, BS present   BLLE no edema or calf tenderness     Labs noted     A/P:  BP stable. Agree with Lasix on non HD days. Discussed with Dr. Estrada. Will discharge on Nifedipine 90mg daily.   Stable to be discharged after HD.      Patient was seen and examined at bedside. Denies any complains     ICU Vital Signs Last 24 Hrs  T(C): 37.3 (13 Mar 2024 13:57), Max: 37.4 (12 Mar 2024 20:09)  T(F): 99.1 (13 Mar 2024 13:57), Max: 99.4 (12 Mar 2024 20:09)  HR: 96 (13 Mar 2024 13:57) (88 - 96)  BP: 179/87 (13 Mar 2024 13:57) (146/75 - 179/87)  BP(mean): --  ABP: --  ABP(mean): --  RR: 16 (13 Mar 2024 13:57) (16 - 18)  SpO2: 98% (13 Mar 2024 13:57) (94% - 100%)    O2 Parameters below as of 13 Mar 2024 13:57  Patient On (Oxygen Delivery Method): room air    P/E:  NAD  AAOx3, no focal deficit   CTABL  S1S2 WNL  Abd soft, non tender, BS present   BLLE no edema or calf tenderness     Labs noted     A/P:  BP stable. Agree with Lasix on non HD days. Discussed with Dr. Estrada. Will discharge on Nifedipine 90mg daily. May increase HD days afterwards.   Stable to be discharged after HD.   Discussed above in detail with her daughter.

## 2024-03-11 NOTE — DISCHARGE NOTE PROVIDER - NSDCCPCAREPLAN_GEN_ALL_CORE_FT
PRINCIPAL DISCHARGE DIAGNOSIS  Diagnosis: Acute hypoxic respiratory failure  Assessment and Plan of Treatment: You came into the hospital due to shortness of breath. You were put on oxygen due to poor saturation. Your blood pressure was 231/124 and your labs and exam indicated that you were in a state of acute hypoxic respiratory failure due to fluid overload. We also did a chest xray that showed interstitial infiltrates. Your EKG was normal sinus rythm but tachycardia. You were given hydralazine, lopressor, lasix and procardia. Your vitals started to trend down. You also underwent HD and you started to feel a lot better. We took you off the oxygen and you were able to walk around on room air saturating we. We are sending you home on lasix 80mg that you should take on the days you are not being dialyzed (TThSa). Please continue to follow up with nephrologist and primary care doctor.      SECONDARY DISCHARGE DIAGNOSES  Diagnosis: ESRD on dialysis  Assessment and Plan of Treatment: You have a history of End stage renal disease and recieve dialysis three times a week. We are adding a medication called lasix to your regimen on the days you do not go to dialysis to help keep the fluid off. Please take this medication as perscribed and follow up with your nephrologist and primary care doctor.    Diagnosis: Hypertensive emergency  Assessment and Plan of Treatment: Your blood pressure was very high at  231/124 when you came into the hospital. You were given hydralazine, lopressor, and procardia. Your blood pressure started to normalize. We believe this is most likely due to your fluid overload.  We advise you take the lasix as perscribed above.    Diagnosis: Sarcoidosis  Assessment and Plan of Treatment: You have a history of Sarcoidosiss, Please continue to take prednisone 10mg daily. Please follow up with your primary care provider and rhuematologist.    Diagnosis: Hyperlipidemia  Assessment and Plan of Treatment: You have hyperlipidemia. Please continue to take your cholesterol medication Atorvastatin. Maintain a healthy diet that consist of low sugar, low fat, low sodium. Decrease the amount of fried foods that you consume. Exercise frequently if possible. Please follow up with  your primary care provider within 1 week of your discharge.  You are recommended a DASH Diet that emphasizes mostly vegetables, fruits, and fat-free or low-fat dairy products. Please limit intake of sodium, sweets, beverages w/ high sugar/carbohydrate content.      Diagnosis: Diabetes mellitus  Assessment and Plan of Treatment: You have a history of diabetes mellitus. Please contineu to take your Januvia at home. You must maintain a healthy diet that consists of low sugar and low fat. Your diet must consist of mostly vegetables. Please limit your intake of high sugar and high carbohydrate foods such as pasta, rice, and bread. Exercise frequently if possible. Follow up with primary care physician within one week of your discharge to further manage your diabetes and monitor your Hemoglobin A1c levels after 3 months to evaluate your diabetes control.       PRINCIPAL DISCHARGE DIAGNOSIS  Diagnosis: Acute hypoxic respiratory failure  Assessment and Plan of Treatment: You came into the hospital due to shortness of breath. You were put on oxygen due to poor saturation. Your blood pressure was 231/124 and your labs and exam indicated that you were in a state of acute hypoxic respiratory failure due to fluid overload. We also did a chest xray that showed interstitial infiltrates. Your EKG was normal sinus rythm but tachycardia. You were given hydralazine, lopressor, lasix and procardia. Your vitals started to trend down. You also underwent HD and you started to feel a lot better. We took you off the oxygen and you were able to walk around on room air saturating we. We are sending you home on lasix 80mg that you should take on the days you are not being dialyzed (TThSa). Please continue to follow up with nephrologist and primary care doctor.      SECONDARY DISCHARGE DIAGNOSES  Diagnosis: ESRD on dialysis  Assessment and Plan of Treatment: You have a history of End stage renal disease and recieve dialysis three times a week. We are adding a medication called lasix to your regimen on the days you do not go to dialysis to help keep the fluid off. Please take this medication as perscribed and follow up with your nephrologist and primary care doctor.    Diagnosis: Hypertensive emergency  Assessment and Plan of Treatment: Your blood pressure was very high at  231/124 when you came into the hospital. You were given hydralazine, lopressor, and procardia. Your blood pressure started to normalize. We believe this is most likely due to your fluid overload.  We advise you take the lasix as perscribed above. We are also increasing you nifedipine to 90 mg .Please take this medication as perscribed.    Diagnosis: Sarcoidosis  Assessment and Plan of Treatment: You have a history of Sarcoidosiss, Please continue to take prednisone 10mg daily. Please follow up with your primary care provider and rhuematologist.    Diagnosis: Hyperlipidemia  Assessment and Plan of Treatment: You have hyperlipidemia. Please continue to take your cholesterol medication Atorvastatin. Maintain a healthy diet that consist of low sugar, low fat, low sodium. Decrease the amount of fried foods that you consume. Exercise frequently if possible. Please follow up with  your primary care provider within 1 week of your discharge.  You are recommended a DASH Diet that emphasizes mostly vegetables, fruits, and fat-free or low-fat dairy products. Please limit intake of sodium, sweets, beverages w/ high sugar/carbohydrate content.      Diagnosis: Diabetes mellitus  Assessment and Plan of Treatment: You have a history of diabetes mellitus. Please contineu to take your Januvia at home. You must maintain a healthy diet that consists of low sugar and low fat. Your diet must consist of mostly vegetables. Please limit your intake of high sugar and high carbohydrate foods such as pasta, rice, and bread. Exercise frequently if possible. Follow up with primary care physician within one week of your discharge to further manage your diabetes and monitor your Hemoglobin A1c levels after 3 months to evaluate your diabetes control.       PRINCIPAL DISCHARGE DIAGNOSIS  Diagnosis: Acute hypoxic respiratory failure  Assessment and Plan of Treatment: You came into the hospital due to shortness of breath. You were put on oxygen due to poor saturation. Your blood pressure was 231/124 and your labs and exam indicated that you were in a state of acute hypoxic respiratory failure due to fluid overload. We also did a chest xray that showed interstitial infiltrates. Your EKG was normal sinus rythm but tachycardia. You were given hydralazine, lopressor, lasix and procardia. Your vitals started to trend down. You also underwent HD and you started to feel a lot better. We took you off the oxygen and you were able to walk around on room air saturating well. We are sending you home on lasix 80mg that you should take on the days you are not being dialyzed (TThSa). Please continue to follow up with nephrologist and primary care doctor.      SECONDARY DISCHARGE DIAGNOSES  Diagnosis: Hypertensive emergency  Assessment and Plan of Treatment: Your blood pressure was very high at  231/124 when you came into the hospital. You were given hydralazine, lopressor, and procardia. Your blood pressure started to normalize. We believe this is most likely due to your fluid overload.  We advise you take the lasix as perscribed above. We are also increasing you nifedipine to 90 mg .Please take this medication as perscribed.    Diagnosis: Sarcoidosis  Assessment and Plan of Treatment: You have a history of Sarcoidosiss, Please continue to take prednisone 10mg daily. Please follow up with your primary care provider and rhuematologist.    Diagnosis: Hyperlipidemia  Assessment and Plan of Treatment: You have hyperlipidemia. Please continue to take your cholesterol medication Atorvastatin. Maintain a healthy diet that consist of low sugar, low fat, low sodium. Decrease the amount of fried foods that you consume. Exercise frequently if possible. Please follow up with  your primary care provider within 1 week of your discharge.  You are recommended a DASH Diet that emphasizes mostly vegetables, fruits, and fat-free or low-fat dairy products. Please limit intake of sodium, sweets, beverages w/ high sugar/carbohydrate content.      Diagnosis: Diabetes mellitus  Assessment and Plan of Treatment: You have a history of diabetes mellitus. Please contineu to take your Januvia at home. You must maintain a healthy diet that consists of low sugar and low fat. Your diet must consist of mostly vegetables. Please limit your intake of high sugar and high carbohydrate foods such as pasta, rice, and bread. Exercise frequently if possible. Follow up with primary care physician within one week of your discharge to further manage your diabetes and monitor your Hemoglobin A1c levels after 3 months to evaluate your diabetes control.      Diagnosis: ESRD on dialysis  Assessment and Plan of Treatment: You have a history of End stage renal disease and recieve dialysis three times a week. We are adding a medication called lasix to your regimen on the days you do not go to dialysis to help keep the fluid off. Please take this medication as perscribed and follow up with your nephrologist and primary care doctor.

## 2024-03-11 NOTE — CONSULT NOTE ADULT - SUBJECTIVE AND OBJECTIVE BOX
[facility] : Progress Note :: [phone], (office [phone]),   Dr Estrada / Dr Das / Dr Gale / Dr Vega / Dr Debbie FORD / Dr Garcia / Dr Chandra / Dr Jona pal  _____________________________________________________________________________________________  Patient is a 53y Female whom presented to the hospital with shortness of breath. She has ESRD on HD at [facility]. as per her has not missed any HD. Also compliant with diet and fluid per her. In ED found with pulmonary edema.  s/p HD earlier this morning with improvement with SOB and BP.    PAST MEDICAL & SURGICAL HISTORY:  Sarcoidosis      Diabetes      Hypertension      Hyperlipidemia      Chronic kidney disease (CKD), stage III (moderate)      Seizure disorder      No significant past surgical history        No Known Allergies    Home Medications Reviewed  Hospital Medications:   MEDICATIONS  (STANDING):  atorvastatin 10 milliGRAM(s) Oral at bedtime  epoetin suzette (PROCRIT) Injectable 4000 Unit(s) IV Push <User Schedule>  ferrous    sulfate 325 milliGRAM(s) Oral daily  gabapentin 300 milliGRAM(s) Oral two times a day  heparin   Injectable 5000 Unit(s) SubCutaneous every 12 hours  hydrALAZINE 50 milliGRAM(s) Oral three times a day  insulin lispro (ADMELOG) corrective regimen sliding scale   SubCutaneous Before meals and at bedtime  metoprolol succinate ER 25 milliGRAM(s) Oral daily  NIFEdipine XL 60 milliGRAM(s) Oral daily  predniSONE   Tablet 10 milliGRAM(s) Oral daily  sevelamer carbonate 1600 milliGRAM(s) Oral three times a day    SOCIAL HISTORY:  Denies ETOh,Smoking,   FAMILY HISTORY:  Family history of diabetes mellitus in first degree relative        VITALS:  T(F): 99.3 (03-11-24 @ 16:18), Max: 99.3 (03-11-24 @ 16:18)  HR: 113 (03-11-24 @ 16:18)  BP: 132/76 (03-11-24 @ 16:18)  RR: 18 (03-11-24 @ 16:18)  SpO2: 92% (03-11-24 @ 16:18)  Wt(kg): --    03-11 @ 07:01  -  03-11 @ 20:05  --------------------------------------------------------  IN: 500 mL / OUT: 3500 mL / NET: -3000 mL        PHYSICAL EXAM:  Constitutional: NAD  HEENT: anicteric sclera, oropharynx clear,  Neck: No JVD  Respiratory: CTAB, no wheezes, rales or rhonchi  Cardiovascular: S1, S2, RRR  Gastrointestinal: BS+, soft, NT/ND  Extremities: No peripheral edema  Neurological: A/O x 3, no focal deficits  Psychiatric: Normal mood, normal affect  : No CVA tenderness. No rai.   Skin: No rashes  Vascular Access: IJ permacat     LABS:  03-11    137  |  104  |  48<H>  ----------------------------<  154<H>  5.3   |  27  |  7.11<H>    Ca    9.1      11 Mar 2024 05:36  Phos  6.3     03-11  Mg     2.6     03-11    TPro  6.3  /  Alb  2.7<L>  /  TBili  0.5  /  DBili      /  AST  24  /  ALT  29  /  AlkPhos  79  03-11    Creatinine Trend: 7.11 <--, 6.42 <--, 4.58 <--                        10.2   6.78  )-----------( 240      ( 11 Mar 2024 05:36 )             31.1     Urine Studies:  Urinalysis Basic - ( 11 Mar 2024 05:36 )    Color:  / Appearance:  / SG:  / pH:   Gluc: 154 mg/dL / Ketone:   / Bili:  / Urobili:    Blood:  / Protein:  / Nitrite:    Leuk Esterase:  / RBC:  / WBC    Sq Epi:  / Non Sq Epi:  / Bacteria:         RADIOLOGY & ADDITIONAL STUDIES:

## 2024-03-11 NOTE — DISCHARGE NOTE PROVIDER - NSDCFUSCHEDAPPT_GEN_ALL_CORE_FT
Rosie Fagan  [facility]  [address]  Scheduled Appointment: 04/30/2024

## 2024-03-12 LAB
ALBUMIN SERPL ELPH-MCNC: 2.7 G/DL — LOW (ref 3.5–5)
ALP SERPL-CCNC: 73 U/L — SIGNIFICANT CHANGE UP (ref 40–120)
ALT FLD-CCNC: 24 U/L DA — SIGNIFICANT CHANGE UP (ref 10–60)
ANION GAP SERPL CALC-SCNC: 2 MMOL/L — LOW (ref 5–17)
AST SERPL-CCNC: 16 U/L — SIGNIFICANT CHANGE UP (ref 10–40)
BILIRUB SERPL-MCNC: 0.3 MG/DL — SIGNIFICANT CHANGE UP (ref 0.2–1.2)
BUN SERPL-MCNC: 23 MG/DL — HIGH (ref 7–18)
CALCIUM SERPL-MCNC: 9.1 MG/DL — SIGNIFICANT CHANGE UP (ref 8.4–10.5)
CHLORIDE SERPL-SCNC: 105 MMOL/L — SIGNIFICANT CHANGE UP (ref 96–108)
CO2 SERPL-SCNC: 30 MMOL/L — SIGNIFICANT CHANGE UP (ref 22–31)
CREAT SERPL-MCNC: 4.2 MG/DL — HIGH (ref 0.5–1.3)
EGFR: 12 ML/MIN/1.73M2 — LOW
GLUCOSE BLDC GLUCOMTR-MCNC: 158 MG/DL — HIGH (ref 70–99)
GLUCOSE BLDC GLUCOMTR-MCNC: 219 MG/DL — HIGH (ref 70–99)
GLUCOSE BLDC GLUCOMTR-MCNC: 232 MG/DL — HIGH (ref 70–99)
GLUCOSE SERPL-MCNC: 168 MG/DL — HIGH (ref 70–99)
HCT VFR BLD CALC: 31 % — LOW (ref 34.5–45)
HGB BLD-MCNC: 9.9 G/DL — LOW (ref 11.5–15.5)
MAGNESIUM SERPL-MCNC: 2.4 MG/DL — SIGNIFICANT CHANGE UP (ref 1.6–2.6)
MCHC RBC-ENTMCNC: 30 PG — SIGNIFICANT CHANGE UP (ref 27–34)
MCHC RBC-ENTMCNC: 31.9 GM/DL — LOW (ref 32–36)
MCV RBC AUTO: 93.9 FL — SIGNIFICANT CHANGE UP (ref 80–100)
NRBC # BLD: 0 /100 WBCS — SIGNIFICANT CHANGE UP (ref 0–0)
PHOSPHATE SERPL-MCNC: 3.6 MG/DL — SIGNIFICANT CHANGE UP (ref 2.5–4.5)
PLATELET # BLD AUTO: 245 K/UL — SIGNIFICANT CHANGE UP (ref 150–400)
POTASSIUM SERPL-MCNC: 4.4 MMOL/L — SIGNIFICANT CHANGE UP (ref 3.5–5.3)
POTASSIUM SERPL-SCNC: 4.4 MMOL/L — SIGNIFICANT CHANGE UP (ref 3.5–5.3)
PROT SERPL-MCNC: 6.7 G/DL — SIGNIFICANT CHANGE UP (ref 6–8.3)
RBC # BLD: 3.3 M/UL — LOW (ref 3.8–5.2)
RBC # FLD: 14.7 % — HIGH (ref 10.3–14.5)
SODIUM SERPL-SCNC: 137 MMOL/L — SIGNIFICANT CHANGE UP (ref 135–145)
WBC # BLD: 5.4 K/UL — SIGNIFICANT CHANGE UP (ref 3.8–10.5)
WBC # FLD AUTO: 5.4 K/UL — SIGNIFICANT CHANGE UP (ref 3.8–10.5)

## 2024-03-12 PROCEDURE — 99233 SBSQ HOSP IP/OBS HIGH 50: CPT | Mod: GC

## 2024-03-12 RX ORDER — FUROSEMIDE 40 MG
80 TABLET ORAL ONCE
Refills: 0 | Status: DISCONTINUED | OUTPATIENT
Start: 2024-03-12 | End: 2024-03-12

## 2024-03-12 RX ORDER — NIFEDIPINE 30 MG
30 TABLET, EXTENDED RELEASE 24 HR ORAL AT BEDTIME
Refills: 0 | Status: DISCONTINUED | OUTPATIENT
Start: 2024-03-12 | End: 2024-03-13

## 2024-03-12 RX ORDER — FUROSEMIDE 40 MG
80 TABLET ORAL ONCE
Refills: 0 | Status: COMPLETED | OUTPATIENT
Start: 2024-03-12 | End: 2024-03-12

## 2024-03-12 RX ADMIN — SEVELAMER CARBONATE 1600 MILLIGRAM(S): 2400 POWDER, FOR SUSPENSION ORAL at 05:04

## 2024-03-12 RX ADMIN — Medication 25 MILLIGRAM(S): at 05:02

## 2024-03-12 RX ADMIN — ATORVASTATIN CALCIUM 10 MILLIGRAM(S): 80 TABLET, FILM COATED ORAL at 21:26

## 2024-03-12 RX ADMIN — SEVELAMER CARBONATE 1600 MILLIGRAM(S): 2400 POWDER, FOR SUSPENSION ORAL at 14:57

## 2024-03-12 RX ADMIN — Medication 2: at 12:26

## 2024-03-12 RX ADMIN — GABAPENTIN 300 MILLIGRAM(S): 400 CAPSULE ORAL at 18:05

## 2024-03-12 RX ADMIN — Medication 80 MILLIGRAM(S): at 12:27

## 2024-03-12 RX ADMIN — Medication 60 MILLIGRAM(S): at 05:02

## 2024-03-12 RX ADMIN — Medication 2: at 08:49

## 2024-03-12 RX ADMIN — SEVELAMER CARBONATE 1600 MILLIGRAM(S): 2400 POWDER, FOR SUSPENSION ORAL at 21:27

## 2024-03-12 RX ADMIN — Medication 5 MILLIGRAM(S): at 00:03

## 2024-03-12 RX ADMIN — Medication 1: at 17:24

## 2024-03-12 RX ADMIN — Medication 50 MILLIGRAM(S): at 21:26

## 2024-03-12 RX ADMIN — GABAPENTIN 300 MILLIGRAM(S): 400 CAPSULE ORAL at 05:04

## 2024-03-12 RX ADMIN — Medication 30 MILLIGRAM(S): at 21:26

## 2024-03-12 RX ADMIN — Medication 50 MILLIGRAM(S): at 14:57

## 2024-03-12 RX ADMIN — Medication 2: at 21:27

## 2024-03-12 RX ADMIN — Medication 50 MILLIGRAM(S): at 05:02

## 2024-03-12 RX ADMIN — Medication 10 MILLIGRAM(S): at 05:03

## 2024-03-12 RX ADMIN — Medication 325 MILLIGRAM(S): at 12:27

## 2024-03-12 RX ADMIN — Medication 5 MILLIGRAM(S): at 21:27

## 2024-03-12 NOTE — PROGRESS NOTE ADULT - PROBLEM SELECTOR PLAN 5
On [facility] outpatient  Sliding Scale, fs achs
On [facility] outpatient  Sliding Scale, fs achs

## 2024-03-12 NOTE — PROGRESS NOTE ADULT - PROBLEM SELECTOR PLAN 1
-  presented to the ED for shortness of breath. w/ multiple re-admissions in the setting of hypertensive emergency, flash-pulmonary edema, fluid overload in the setting of ESRD req. HD   -  Vitals sig for hypoxia to 80% on RA > % on 3 L NC,/124 > 160s systolic s/p medications.   - EKG- sinus tachycardia to 108 bpm   - trops 102-> 90.2 ( trended down)  - CXR shows interstitial infiltrates b/l, improved from prior CXR 3/04.   - s/p hydralazine 10 x1 ivp, lopressor 25 PO, and procardia 60mg PO.   - HD by Nephro Dr. Estrada, will re-instate for 3/11 HD   - will add lasix for days she does not receive dialysis.      - O2 PRN -  presented to the ED for shortness of breath. w/ multiple re-admissions in the setting of hypertensive emergency, flash-pulmonary edema, fluid overload in the setting of ESRD req. HD   -  Vitals sig for hypoxia to 80% on RA > % on 3 L NC,/124 > 160s systolic s/p medications.   - EKG- sinus tachycardia to 108 bpm   - trops 102-> 90.2 ( trended down)  - CXR shows interstitial infiltrates b/l, improved from prior CXR 3/04.   - s/p hydralazine 10 x1 ivp, lopressor 25 PO, and procardia 60mg PO.   - HD by Nephro Dr. Estrada, will re-instate for 3/11 HD   - will consider to add lasix for days she does not receive dialysis.      - O2 PRN

## 2024-03-12 NOTE — PROGRESS NOTE ADULT - REASON FOR ADMISSION
AHRF secondary to fluid overload in the setting of flash pulmonary edema

## 2024-03-12 NOTE — PROGRESS NOTE ADULT - PROBLEM SELECTOR PLAN 4
- plan as above - on hydralazine 50 mg , metoprolol 25 mg and nifedipine 60  -continue to monitor   - can consider raising nifedipine as described above.

## 2024-03-12 NOTE — PROGRESS NOTE ADULT - NS ATTEST RISK PROBLEM GEN_ALL_CORE FT
Complexity of active medical problems, bedside assessment, formulating treatment plan after discussion with medical team and consultants; as well as discussion of treatment plan, prognosis, care coordination, and GOC with family

## 2024-03-12 NOTE — PROGRESS NOTE ADULT - PROBLEM SELECTOR PLAN 2
- pt had multiple re-admissions in the setting of hypertensive emergency, flash-pulmonary edema, fluid overload in the setting of ESRD req. HD   - s/p hydralazine 10 x1 ivp, lopressor 25 PO, and procardia 60mg PO.   - HD by Nephro Dr. Estrada, will re-instate for 3/11 HD   - c/w home dose hypertensive mediations  - Current BP is 165/80  continue to monitor - pt had multiple re-admissions in the setting of hypertensive emergency, flash-pulmonary edema, fluid overload in the setting of ESRD req. HD   - s/p hydralazine 10 x1 ivp, lopressor 25 PO, and procardia 60mg PO.   - HD by Nephro Dr. Estrada, will re-instate for 3/11 HD   - c/w home dose hypertensive mediations  - Current BP is 165/80  - s/p one dose of lasix 80 PO  continue to monitor - pt had multiple re-admissions in the setting of hypertensive emergency, flash-pulmonary edema, fluid overload in the setting of ESRD req. HD   - s/p hydralazine 10 x1 ivp, lopressor 25 PO, and procardia 60mg PO.   - HD by Nephro Dr. Estrada, will re-instate for 3/11 HD   - c/w home dose hypertensive mediations  - Current BP is 165/80  - s/p one dose of lasix 80 PO  - continue to monitor, if bp is still high can raise nifedipine.

## 2024-03-12 NOTE — PROGRESS NOTE ADULT - ASSESSMENT
Patient is a 53y Female whom presented to the hospital with shortness of breath. She has ESRD on HD at [facility]. as per her has not missed any HD. Also compliant with diet and fluid per her. In ED found with pulmonary edema.  s/p HD earlier this morning with improvement with SOB and BP.    # ESRD. s/p hemodialysis earlier today with improvement of symptoms.  Agree with lasix 80mg over the weekend on non-HD days. Also will conisder 4 days of HD as outpt if its established she is compliant with HD   # HTN. blood pressure improved with UF and resumption of BP meds. UF on HD   # anemia of CKD procrit on HD. 
Pt is a 53F from home ambulates independently, with PMH of HTN, DM2, ESRD on HD M/W/F (last session 3/1 makes minimal urine), sarcoidosis on prednisone, anemia, seizure disorder (no longer on meds) who presented to the ED for shortness of breath. Vitals sig for hypoxia to 80% on RA > % on 3 L NC,/124 > 160s systolic s/p medications. Labs sig for BUN/Cr 43/6.42, trop 102.9,pro-BNP 104k, cov/flu negative. CXR shows interstitial infiltrates b/l, improved from prior CXR 3/04. s/p hydralazine 10 x1 ivp, lopressor 25 PO, and procardia 60mg PO. Admitted for AHRF secondary to fluid overload in the setting of flash pulmonary edema.   
Pt is a 53F from home ambulates independently, with PMH of HTN, DM2, ESRD on HD M/W/F (last session 3/1 makes minimal urine), sarcoidosis on prednisone, anemia, seizure disorder (no longer on meds) who presented to the ED for shortness of breath. Vitals sig for hypoxia to 80% on RA > % on 3 L NC,/124 > 160s systolic s/p medications. Labs sig for BUN/Cr 43/6.42, trop 102.9,pro-BNP 104k, cov/flu negative. CXR shows interstitial infiltrates b/l, improved from prior CXR 3/04. s/p hydralazine 10 x1 ivp, lopressor 25 PO, and procardia 60mg PO. Admitted for AHRF secondary to fluid overload in the setting of flash pulmonary edema.

## 2024-03-12 NOTE — PROGRESS NOTE ADULT - SUBJECTIVE AND OBJECTIVE BOX
[facility] : Progress Note :: [phone], (office [phone]),   Dr Estrada / Dr Das / Dr Gale / Dr Vega / Dr Debbie FORD / Dr Garcia / Dr Chandra / Dr Jona pal  _____________________________________________________________________________________________    hypertensive.    No Known Allergies    Hospital Medications:   MEDICATIONS  (STANDING):  atorvastatin 10 milliGRAM(s) Oral at bedtime  epoetin suzette (PROCRIT) Injectable 4000 Unit(s) IV Push <User Schedule>  ferrous    sulfate 325 milliGRAM(s) Oral daily  gabapentin 300 milliGRAM(s) Oral two times a day  heparin   Injectable 5000 Unit(s) SubCutaneous every 12 hours  hydrALAZINE 50 milliGRAM(s) Oral three times a day  insulin lispro (ADMELOG) corrective regimen sliding scale   SubCutaneous Before meals and at bedtime  melatonin 5 milliGRAM(s) Oral at bedtime  metoprolol succinate ER 25 milliGRAM(s) Oral daily  NIFEdipine XL 60 milliGRAM(s) Oral daily  NIFEdipine XL 30 milliGRAM(s) Oral at bedtime  predniSONE   Tablet 10 milliGRAM(s) Oral daily  sevelamer carbonate 1600 milliGRAM(s) Oral three times a day        VITALS:  T(F): 98.2 (03-12-24 @ 15:39), Max: 99.7 (03-11-24 @ 20:25)  HR: 93 (03-12-24 @ 15:39)  BP: 167/91 (03-12-24 @ 15:39)  RR: 18 (03-12-24 @ 15:39)  SpO2: 94% (03-12-24 @ 15:39)  Wt(kg): --    03-11 @ 07:01  -  03-12 @ 07:00  --------------------------------------------------------  IN: 500 mL / OUT: 3500 mL / NET: -3000 mL        PHYSICAL EXAM:  Constitutional: NAD  HEENT: anicteric sclera, oropharynx clear,   Neck: No JVD  Respiratory: CTAB, no wheezes, rales or rhonchi  Cardiovascular: S1, S2, RRR  Gastrointestinal: BS+, soft, NT/ND  Extremities:  No peripheral edema  Neurological: A/O x 3, no focal deficits  Vascular Access: IJ permacath     LABS:  03-12    137  |  105  |  23<H>  ----------------------------<  168<H>  4.4   |  30  |  4.20<H>    Ca    9.1      12 Mar 2024 05:16  Phos  3.6     03-12  Mg     2.4     03-12    TPro  6.7  /  Alb  2.7<L>  /  TBili  0.3  /  DBili      /  AST  16  /  ALT  24  /  AlkPhos  73  03-12    Creatinine Trend: 4.20 <--, 7.11 <--, 6.42 <--                        9.9    5.40  )-----------( 245      ( 12 Mar 2024 05:16 )             31.0     Urine Studies:  Urinalysis Basic - ( 12 Mar 2024 05:16 )    Color:  / Appearance:  / SG:  / pH:   Gluc: 168 mg/dL / Ketone:   / Bili:  / Urobili:    Blood:  / Protein:  / Nitrite:    Leuk Esterase:  / RBC:  / WBC    Sq Epi:  / Non Sq Epi:  / Bacteria:         RADIOLOGY & ADDITIONAL STUDIES:  
PGY-1 Progress Note discussed with attending    PAGER #: [[phone]] TILL 5:00 PM  PLEASE CONTACT ON CALL TEAM:  - On Call Team (Please refer to Aline) FROM 5:00 PM - 8:30PM  - Nightfloat Team FROM 8:30 -7:30 AM    CHIEF COMPLAINT & BRIEF HOSPITAL COURSE: No acute overnight events. Patient bp is elevated but saturating well on room air.     INTERVAL HPI/OVERNIGHT EVENTS:   MEDICATIONS  (STANDING):  atorvastatin 10 milliGRAM(s) Oral at bedtime  epoetin suzette (PROCRIT) Injectable 4000 Unit(s) IV Push <User Schedule>  ferrous    sulfate 325 milliGRAM(s) Oral daily  gabapentin 300 milliGRAM(s) Oral two times a day  heparin   Injectable 5000 Unit(s) SubCutaneous every 12 hours  hydrALAZINE 50 milliGRAM(s) Oral three times a day  insulin lispro (ADMELOG) corrective regimen sliding scale   SubCutaneous Before meals and at bedtime  melatonin 5 milliGRAM(s) Oral at bedtime  metoprolol succinate ER 25 milliGRAM(s) Oral daily  NIFEdipine XL 60 milliGRAM(s) Oral daily  predniSONE   Tablet 10 milliGRAM(s) Oral daily  sevelamer carbonate 1600 milliGRAM(s) Oral three times a day    MEDICATIONS  (PRN):      REVIEW OF SYSTEMS:  CONSTITUTIONAL: No fever, weight loss, or fatigue  RESPIRATORY: No shortness of breath  CARDIOVASCULAR: No chest pain  GASTROINTESTINAL: No abdominal pain.  GENITOURINARY: No dysuria  NEUROLOGICAL: No headaches. + back pain   SKIN: No itching, burning, rashes    Vital Signs Last 24 Hrs  T(C): 36.7 (12 Mar 2024 08:16), Max: 37.6 (11 Mar 2024 20:25)  T(F): 98.1 (12 Mar 2024 08:16), Max: 99.7 (11 Mar 2024 20:25)  HR: 101 (12 Mar 2024 08:16) (94 - 113)  BP: 187/100 (12 Mar 2024 08:16) (124/77 - 187/100)  BP(mean): 125 (12 Mar 2024 08:16) (100 - 125)  RR: 18 (12 Mar 2024 08:16) (18 - 20)  SpO2: 91% (12 Mar 2024 08:16) (91% - 99%)    Parameters below as of 12 Mar 2024 08:16  Patient On (Oxygen Delivery Method): room air        PHYSICAL EXAMINATION:  GENERAL: NAD, well built  HEAD:  Atraumatic, Normocephalic  EYES:  conjunctiva and sclera clear  CHEST/LUNG: Clear to auscultation. No rales, rhonchi, wheezing, or rubs  HEART: Regular rate and rhythm; No murmurs, rubs, or gallops  ABDOMEN: Soft, Nontender, Nondistended; Bowel sounds present  NERVOUS SYSTEM:  Alert & Oriented X3,    EXTREMITIES:  2+ Peripheral Pulses, No clubbing, cyanosis, or edema  SKIN: warm dry                          9.9    5.40  )-----------( 245      ( 12 Mar 2024 05:16 )             31.0     03-12    137  |  105  |  23<H>  ----------------------------<  168<H>  4.4   |  30  |  4.20<H>    Ca    9.1      12 Mar 2024 05:16  Phos  3.6     03-12  Mg     2.4     03-12    TPro  6.7  /  Alb  2.7<L>  /  TBili  0.3  /  DBili  x   /  AST  16  /  ALT  24  /  AlkPhos  73  03-12    LIVER FUNCTIONS - ( 12 Mar 2024 05:16 )  Alb: 2.7 g/dL / Pro: 6.7 g/dL / ALK PHOS: 73 U/L / ALT: 24 U/L DA / AST: 16 U/L / GGT: x               PT/INR - ( 10 Mar 2024 18:40 )   PT: 10.2 sec;   INR: 0.89 ratio         PTT - ( 10 Mar 2024 18:40 )  PTT:32.2 sec    CAPILLARY BLOOD GLUCOSE      RADIOLOGY & ADDITIONAL TESTS:                  
PGY-1 Progress Note discussed with attending    PAGER #: [[phone]] TILL 5:00 PM  PLEASE CONTACT ON CALL TEAM:  - On Call Team (Please refer to Aline) FROM 5:00 PM - 8:30PM  - Nightfloat Team FROM 8:30 -7:30 AM    CHIEF COMPLAINT & BRIEF HOSPITAL COURSE: No acute overnight events. Seen patient at bedside. She reports she is breathing better. Also reports some back pain that is not unusual for her.      INTERVAL HPI/OVERNIGHT EVENTS:   MEDICATIONS  (STANDING):  atorvastatin 10 milliGRAM(s) Oral at bedtime  epoetin suzette (PROCRIT) Injectable 4000 Unit(s) IV Push <User Schedule>  ferrous    sulfate 325 milliGRAM(s) Oral daily  gabapentin 300 milliGRAM(s) Oral two times a day  heparin   Injectable 5000 Unit(s) SubCutaneous every 12 hours  hydrALAZINE 50 milliGRAM(s) Oral three times a day  insulin lispro (ADMELOG) corrective regimen sliding scale   SubCutaneous Before meals and at bedtime  metoprolol succinate ER 25 milliGRAM(s) Oral daily  NIFEdipine XL 60 milliGRAM(s) Oral daily  predniSONE   Tablet 10 milliGRAM(s) Oral daily  sevelamer carbonate 1600 milliGRAM(s) Oral three times a day    MEDICATIONS  (PRN):      REVIEW OF SYSTEMS:  CONSTITUTIONAL: No fever, weight loss, or fatigue  RESPIRATORY: Mild SOB  CARDIOVASCULAR: No chest pain  GASTROINTESTINAL: No abdominal pain.  GENITOURINARY: No dysuria  NEUROLOGICAL: No headaches. + back pain   SKIN: No itching, burning, rashes    Vital Signs Last 24 Hrs  T(C): 37 (11 Mar 2024 08:21), Max: 37.3 (10 Mar 2024 23:54)  T(F): 98.6 (11 Mar 2024 08:21), Max: 99.1 (10 Mar 2024 23:54)  HR: 95 (11 Mar 2024 08:21) (87 - 118)  BP: 125/78 (11 Mar 2024 08:21) (125/78 - 231/124)  BP(mean): 92 (11 Mar 2024 08:21) (92 - 110)  RR: 18 (11 Mar 2024 08:21) (18 - 22)  SpO2: 96% (11 Mar 2024 08:21) (80% - 100%)    Parameters below as of 11 Mar 2024 04:20  Patient On (Oxygen Delivery Method): room air  O2 Flow (L/min): 2      PHYSICAL EXAMINATION:  GENERAL: NAD, well built  HEAD:  Atraumatic, Normocephalic  EYES:  conjunctiva and sclera clear  CHEST/LUNG: + crackles   HEART: Regular rate and rhythm; No murmurs, rubs, or gallops  ABDOMEN: Soft, Nontender, Nondistended; Bowel sounds present  NERVOUS SYSTEM:  Alert & Oriented X3,    EXTREMITIES:  2+ Peripheral Pulses, No clubbing, cyanosis, or edema  SKIN: warm dry                          10.2   6.78  )-----------( 240      ( 11 Mar 2024 05:36 )             31.1     03-11    137  |  104  |  48<H>  ----------------------------<  154<H>  5.3   |  27  |  7.11<H>    Ca    9.1      11 Mar 2024 05:36  Phos  6.3     03-11  Mg     2.6     03-11    TPro  6.3  /  Alb  2.7<L>  /  TBili  0.5  /  DBili  x   /  AST  24  /  ALT  29  /  AlkPhos  79  03-11    LIVER FUNCTIONS - ( 11 Mar 2024 05:36 )  Alb: 2.7 g/dL / Pro: 6.3 g/dL / ALK PHOS: 79 U/L / ALT: 29 U/L DA / AST: 24 U/L / GGT: x               PT/INR - ( 10 Mar 2024 18:40 )   PT: 10.2 sec;   INR: 0.89 ratio         PTT - ( 10 Mar 2024 18:40 )  PTT:32.2 sec    CAPILLARY BLOOD GLUCOSE      RADIOLOGY & ADDITIONAL TESTS:

## 2024-03-12 NOTE — PROGRESS NOTE ADULT - ATTENDING COMMENTS
Patient seen and examined this afternoon after HD this morning; Patient well known to me prom prior admission last week with similar complaints    53F from home ambulates independently, with PMH of HTN, DM2, ESRD on HD M/W/F (last session 3/1 makes minimal urine), sarcoidosis on prednisone, anemia, seizure disorder (no longer on meds) who presented to the ED for shortness of breath.     Patient noted with HTN urgency and Acute Pulmonary edema improved with Diuretics and anti HTN meds parenterally; She reports compliance with HD since discharge last week but does admit to missing some doses of anti HTN meds; Patient was able to ambulate independently with saturation 95% RA with ambulation after BP control and HD last week. She is following diet and limiting her fluid intake    I did a saturation at rest 96% RA after returning from HD; She still reports making some urine. It appears that she reports to ED with Pulmonary edema after the weekend    Vital Signs Last 24 Hrs  T(C): 37.5 (11 Mar 2024 23:25), Max: 37.6 (11 Mar 2024 20:25)  T(F): 99.5 (11 Mar 2024 23:25), Max: 99.7 (11 Mar 2024 20:25)  HR: 103 (11 Mar 2024 23:54) (91 - 113)  BP: 158/89 (11 Mar 2024 23:54) (124/77 - 169/87)  BP(mean): 115 (11 Mar 2024 23:54) (92 - 115)  RR: 18 (11 Mar 2024 23:54) (18 - 20)  SpO2: 94% (11 Mar 2024 23:54) (91% - 100%) room air    P/E:  elderly thin built female, comfortable in bed NAD  Psych: AAO x3  Neuro: No gross focal deficits; Power and sensation intact  CVS: S1S2 present, regular, no edema  Resp: BLAE+, No wheeze or Rhonchi  GI: Soft, BS+, Non tender, non distended  Extr: No  calf tenderness B/L Lower extremities; no significant edema noted (had edema previously)  Skin: Warm and moist without any rashes    Labs:                     10.2   6.78  )-----------( 240      ( 11 Mar 2024 05:36 )             31.1   03-11    137  |  104  |  48<H>  ----------------------------<  154<H>  5.3   |  27  |  7.11<H>  Ca    9.1      11 Mar 2024 05:36  Phos  6.3     03-11  Mg     2.6     03-11  TPro  6.3  /  Alb  2.7<L>  /  TBili  0.5  /  DBili  x   /  AST  24  /  ALT  29  /  AlkPhos  79  03-11     X-ray Chest 1 View- PORTABLE-Urgent (03.10.24 @ 19:03)   Impression: Moderate pulmonary venous congestion.    D/D:  Acute Hypoxic R.F with Acute Pulmonary edema due to fluid overload  ppt by   HTN Urgency due to Non compliance with meds with subsequent   Hx Sarcoidosis on chronic Prednisone  ESRD on HD    Plan:  Continue Tele x 24 hrs more  s/p HD and with BP control clinically doing well with resolved hypoxia and beter BP control  In my opinion patient in addition to missing meds, potentially an extra day off with HD potentially adding to excess fluid overload leading to HTN urgency with further fluid overload could be addressed with adding Lasix 80 mg daily over the weekend or non HD days.   I spoke with Dr. Estrada who agrees with the plan as long as she is able to produce urine; if not alternative is HD 4 days a week   Continue home anti HTN regimen as on discharge last admission  Please get an ambulatory saturation on room air tomorrow  Insulin sliding scale; may resume oral regimen on discharge;  Continue Statin   f/u for HD reinstate once stable for discharge  DVT ppx  Patient planned to f/u with Cardio Dr. Richard as outpt who saw her last admission    Discussed with patient at length all the above  Discussed with Housestaff PGY1 Dr. Terry and PGy3 Dr. Browning and RN Zainab  D/C Plan if remain stable, BP controlled and no hypoxia with ambulation  I will be away 3/12/24 onwards; Hospitalist Colleague to assume care AM
Patient was seen and examined at bedside. Denies any complains, wants to go home.     ICU Vital Signs Last 24 Hrs  T(C): 36.9 (12 Mar 2024 11:45), Max: 37.6 (11 Mar 2024 20:25)  T(F): 98.4 (12 Mar 2024 11:45), Max: 99.7 (11 Mar 2024 20:25)  HR: 94 (12 Mar 2024 11:45) (94 - 113)  BP: 157/88 (12 Mar 2024 11:45) (132/76 - 187/100)  BP(mean): 107 (12 Mar 2024 11:45) (107 - 125)  ABP: --  ABP(mean): --  RR: 18 (12 Mar 2024 11:45) (18 - 18)  SpO2: 98% (12 Mar 2024 11:45) (91% - 99%)    O2 Parameters below as of 12 Mar 2024 11:45  Patient On (Oxygen Delivery Method): room air      P/E:  NAD  AAOx3, no focal deficit   Decreased breath sound in left lower zone, clear to auscultation rest of the lung   S1S2 WNL  Abd soft, non tender, BS present   BLLE no edema or calf tenderness     I reviewed CBC, BMP, Mg, Phos, LFTs, Blood sugar   I ordered repeat CBC, BMP and formulated the plan as below  personally reviewed EKG- Sinus tachycardia, early repol in V2, V3, LVH, TWI in lateral leads, changes related to LVH  personally reviewed CXR- BL pulm edema, HD catheter in right chest    A/P:  Hypertensive urgency, pulmonary edema   ESRD on HD  Anemia of chronic disease  HTN  DM  HLD  Sarcoidosis    Plan:   Cont HD as scheduled, s/p one dose of Lasix 80mg PO today, monitor BP during evening, if stable then can be discharged tomorrow. Add Nifedipine 30mg in the evening   Cont rest of the BP meds  SW for HD  BS stable, can be discharged on Januvia  Cont home dose steroid   Hb stable   Rest of the management as above

## 2024-03-13 ENCOUNTER — TRANSCRIPTION ENCOUNTER (OUTPATIENT)
Age: 54
End: 2024-03-13

## 2024-03-13 VITALS
OXYGEN SATURATION: 98 % | HEART RATE: 96 BPM | DIASTOLIC BLOOD PRESSURE: 87 MMHG | RESPIRATION RATE: 16 BRPM | TEMPERATURE: 99 F | SYSTOLIC BLOOD PRESSURE: 179 MMHG

## 2024-03-13 LAB
ALBUMIN SERPL ELPH-MCNC: 2.9 G/DL — LOW (ref 3.5–5)
ALP SERPL-CCNC: 76 U/L — SIGNIFICANT CHANGE UP (ref 40–120)
ALT FLD-CCNC: 23 U/L DA — SIGNIFICANT CHANGE UP (ref 10–60)
ANION GAP SERPL CALC-SCNC: 9 MMOL/L — SIGNIFICANT CHANGE UP (ref 5–17)
AST SERPL-CCNC: 15 U/L — SIGNIFICANT CHANGE UP (ref 10–40)
BILIRUB SERPL-MCNC: 0.4 MG/DL — SIGNIFICANT CHANGE UP (ref 0.2–1.2)
BUN SERPL-MCNC: 39 MG/DL — HIGH (ref 7–18)
CALCIUM SERPL-MCNC: 9.1 MG/DL — SIGNIFICANT CHANGE UP (ref 8.4–10.5)
CHLORIDE SERPL-SCNC: 102 MMOL/L — SIGNIFICANT CHANGE UP (ref 96–108)
CO2 SERPL-SCNC: 28 MMOL/L — SIGNIFICANT CHANGE UP (ref 22–31)
CREAT SERPL-MCNC: 6.05 MG/DL — HIGH (ref 0.5–1.3)
EGFR: 8 ML/MIN/1.73M2 — LOW
GLUCOSE BLDC GLUCOMTR-MCNC: 148 MG/DL — HIGH (ref 70–99)
GLUCOSE BLDC GLUCOMTR-MCNC: 198 MG/DL — HIGH (ref 70–99)
GLUCOSE SERPL-MCNC: 189 MG/DL — HIGH (ref 70–99)
HCT VFR BLD CALC: 31 % — LOW (ref 34.5–45)
HGB BLD-MCNC: 10 G/DL — LOW (ref 11.5–15.5)
MAGNESIUM SERPL-MCNC: 2.7 MG/DL — HIGH (ref 1.6–2.6)
MCHC RBC-ENTMCNC: 29.9 PG — SIGNIFICANT CHANGE UP (ref 27–34)
MCHC RBC-ENTMCNC: 32.3 GM/DL — SIGNIFICANT CHANGE UP (ref 32–36)
MCV RBC AUTO: 92.8 FL — SIGNIFICANT CHANGE UP (ref 80–100)
NRBC # BLD: 0 /100 WBCS — SIGNIFICANT CHANGE UP (ref 0–0)
PHOSPHATE SERPL-MCNC: 4.7 MG/DL — HIGH (ref 2.5–4.5)
PLATELET # BLD AUTO: 276 K/UL — SIGNIFICANT CHANGE UP (ref 150–400)
POTASSIUM SERPL-MCNC: 4.6 MMOL/L — SIGNIFICANT CHANGE UP (ref 3.5–5.3)
POTASSIUM SERPL-SCNC: 4.6 MMOL/L — SIGNIFICANT CHANGE UP (ref 3.5–5.3)
PROT SERPL-MCNC: 6.7 G/DL — SIGNIFICANT CHANGE UP (ref 6–8.3)
RBC # BLD: 3.34 M/UL — LOW (ref 3.8–5.2)
RBC # FLD: 14.2 % — SIGNIFICANT CHANGE UP (ref 10.3–14.5)
SODIUM SERPL-SCNC: 139 MMOL/L — SIGNIFICANT CHANGE UP (ref 135–145)
WBC # BLD: 5.55 K/UL — SIGNIFICANT CHANGE UP (ref 3.8–10.5)
WBC # FLD AUTO: 5.55 K/UL — SIGNIFICANT CHANGE UP (ref 3.8–10.5)

## 2024-03-13 PROCEDURE — 87637 SARSCOV2&INF A&B&RSV AMP PRB: CPT

## 2024-03-13 PROCEDURE — 80053 COMPREHEN METABOLIC PANEL: CPT

## 2024-03-13 PROCEDURE — 85025 COMPLETE CBC W/AUTO DIFF WBC: CPT

## 2024-03-13 PROCEDURE — 83735 ASSAY OF MAGNESIUM: CPT

## 2024-03-13 PROCEDURE — 85610 PROTHROMBIN TIME: CPT

## 2024-03-13 PROCEDURE — 82962 GLUCOSE BLOOD TEST: CPT

## 2024-03-13 PROCEDURE — 83690 ASSAY OF LIPASE: CPT

## 2024-03-13 PROCEDURE — 84702 CHORIONIC GONADOTROPIN TEST: CPT

## 2024-03-13 PROCEDURE — 83880 ASSAY OF NATRIURETIC PEPTIDE: CPT

## 2024-03-13 PROCEDURE — G0378: CPT

## 2024-03-13 PROCEDURE — 71045 X-RAY EXAM CHEST 1 VIEW: CPT

## 2024-03-13 PROCEDURE — 85730 THROMBOPLASTIN TIME PARTIAL: CPT

## 2024-03-13 PROCEDURE — 93005 ELECTROCARDIOGRAM TRACING: CPT

## 2024-03-13 PROCEDURE — 36415 COLL VENOUS BLD VENIPUNCTURE: CPT

## 2024-03-13 PROCEDURE — 99261: CPT

## 2024-03-13 PROCEDURE — 96374 THER/PROPH/DIAG INJ IV PUSH: CPT

## 2024-03-13 PROCEDURE — 85027 COMPLETE CBC AUTOMATED: CPT

## 2024-03-13 PROCEDURE — 84100 ASSAY OF PHOSPHORUS: CPT

## 2024-03-13 PROCEDURE — 99285 EMERGENCY DEPT VISIT HI MDM: CPT | Mod: 25

## 2024-03-13 PROCEDURE — 0241U: CPT

## 2024-03-13 PROCEDURE — 84484 ASSAY OF TROPONIN QUANT: CPT

## 2024-03-13 PROCEDURE — 99239 HOSP IP/OBS DSCHRG MGMT >30: CPT | Mod: GC

## 2024-03-13 RX ORDER — NIFEDIPINE 30 MG
90 TABLET, EXTENDED RELEASE 24 HR ORAL DAILY
Refills: 0 | Status: DISCONTINUED | OUTPATIENT
Start: 2024-03-14 | End: 2024-03-13

## 2024-03-13 RX ORDER — FUROSEMIDE 40 MG
1 TABLET ORAL
Qty: 13 | Refills: 0
Start: 2024-03-13 | End: 2024-04-11

## 2024-03-13 RX ORDER — FUROSEMIDE 10 MG/ML
1 INJECTION INTRAMUSCULAR; INTRAVENOUS
Qty: 13 | Refills: 0 | DISCHARGE
Start: 2024-03-13 | End: 2024-04-11

## 2024-03-13 RX ORDER — NIFEDIPINE 30 MG
1 TABLET, EXTENDED RELEASE 24 HR ORAL
Qty: 30 | Refills: 0
Start: 2024-03-13 | End: 2024-04-11

## 2024-03-13 RX ADMIN — SEVELAMER CARBONATE 1600 MILLIGRAM(S): 2400 POWDER, FOR SUSPENSION ORAL at 13:59

## 2024-03-13 RX ADMIN — ERYTHROPOIETIN 4000 UNIT(S): 10000 INJECTION, SOLUTION INTRAVENOUS; SUBCUTANEOUS at 10:50

## 2024-03-13 RX ADMIN — SEVELAMER CARBONATE 1600 MILLIGRAM(S): 2400 POWDER, FOR SUSPENSION ORAL at 05:33

## 2024-03-13 RX ADMIN — Medication 50 MILLIGRAM(S): at 13:59

## 2024-03-13 RX ADMIN — Medication 325 MILLIGRAM(S): at 13:59

## 2024-03-13 RX ADMIN — Medication 25 MILLIGRAM(S): at 05:32

## 2024-03-13 RX ADMIN — Medication 60 MILLIGRAM(S): at 05:32

## 2024-03-13 RX ADMIN — Medication 1: at 08:23

## 2024-03-13 RX ADMIN — Medication 50 MILLIGRAM(S): at 05:33

## 2024-03-13 RX ADMIN — Medication 10 MILLIGRAM(S): at 05:32

## 2024-03-13 RX ADMIN — GABAPENTIN 300 MILLIGRAM(S): 400 CAPSULE ORAL at 05:33

## 2024-03-13 NOTE — DISCHARGE NOTE NURSING/CASE MANAGEMENT/SOCIAL WORK - PATIENT PORTAL LINK FT
You can access the FollowMyHealth Patient Portal offered by [facility] by registering at the following website: [email or web address]. By joining [facility]’s FollowMyHealth portal, you will also be able to view your health information using other applications (apps) compatible with our system.

## 2024-03-13 NOTE — CHART NOTE - NSCHARTNOTEFT_GEN_A_CORE
Patient reports her PCP is at [address] [facility] physician [facility], follows Dr. Virk. Called, and waited to leave a VM. Could not find Dr. Virk in provider list to leave a teams message.

## 2024-03-21 ENCOUNTER — TRANSCRIPTION ENCOUNTER (OUTPATIENT)
Age: 54
End: 2024-03-21

## 2024-03-21 ENCOUNTER — OUTPATIENT (OUTPATIENT)
Dept: OUTPATIENT SERVICES | Facility: HOSPITAL | Age: 54
LOS: 1 days | End: 2024-03-21

## 2024-03-21 ENCOUNTER — APPOINTMENT (OUTPATIENT)
Dept: INTERNAL MEDICINE | Facility: CLINIC | Age: 54
End: 2024-03-21
Payer: MEDICARE

## 2024-03-21 VITALS
WEIGHT: 103 LBS | HEIGHT: 60 IN | SYSTOLIC BLOOD PRESSURE: 138 MMHG | DIASTOLIC BLOOD PRESSURE: 72 MMHG | OXYGEN SATURATION: 97 % | HEART RATE: 92 BPM | BODY MASS INDEX: 20.22 KG/M2

## 2024-03-21 DIAGNOSIS — R01.1 CARDIAC MURMUR, UNSPECIFIED: ICD-10-CM

## 2024-03-21 DIAGNOSIS — L65.9 NONSCARRING HAIR LOSS, UNSPECIFIED: ICD-10-CM

## 2024-03-21 DIAGNOSIS — G40.909 EPILEPSY, UNSPECIFIED, NOT INTRACTABLE, W/OUT STATUS EPILEPTICUS: ICD-10-CM

## 2024-03-21 DIAGNOSIS — E78.5 HYPERLIPIDEMIA, UNSPECIFIED: ICD-10-CM

## 2024-03-21 DIAGNOSIS — K21.9 GASTRO-ESOPHAGEAL REFLUX DISEASE WITHOUT ESOPHAGITIS: ICD-10-CM

## 2024-03-21 DIAGNOSIS — I10 ESSENTIAL (PRIMARY) HYPERTENSION: ICD-10-CM

## 2024-03-21 DIAGNOSIS — D86.9 SARCOIDOSIS, UNSPECIFIED: ICD-10-CM

## 2024-03-21 DIAGNOSIS — E55.9 VITAMIN D DEFICIENCY, UNSPECIFIED: ICD-10-CM

## 2024-03-21 DIAGNOSIS — Z12.39 ENCOUNTER FOR OTHER SCREENING FOR MALIGNANT NEOPLASM OF BREAST: ICD-10-CM

## 2024-03-21 DIAGNOSIS — N18.5 CHRONIC KIDNEY DISEASE, STAGE 5: ICD-10-CM

## 2024-03-21 DIAGNOSIS — E11.9 TYPE 2 DIABETES MELLITUS WITHOUT COMPLICATIONS: ICD-10-CM

## 2024-03-21 DIAGNOSIS — Z86.19 PERSONAL HISTORY OF OTHER INFECTIOUS AND PARASITIC DISEASES: ICD-10-CM

## 2024-03-21 DIAGNOSIS — K21.9 GASTRO-ESOPHAGEAL REFLUX DISEASE W/OUT ESOPHAGITIS: ICD-10-CM

## 2024-03-21 DIAGNOSIS — R06.00 DYSPNEA, UNSPECIFIED: ICD-10-CM

## 2024-03-21 DIAGNOSIS — M25.562 PAIN IN LEFT KNEE: ICD-10-CM

## 2024-03-21 DIAGNOSIS — G40.909 EPILEPSY, UNSPECIFIED, NOT INTRACTABLE, WITHOUT STATUS EPILEPTICUS: ICD-10-CM

## 2024-03-21 DIAGNOSIS — Z87.39 PERSONAL HISTORY OF OTHER DISEASES OF THE MUSCULOSKELETAL SYSTEM AND CONNECTIVE TISSUE: ICD-10-CM

## 2024-03-21 DIAGNOSIS — Z00.00 ENCOUNTER FOR GENERAL ADULT MEDICAL EXAMINATION W/OUT ABNORMAL FINDINGS: ICD-10-CM

## 2024-03-21 DIAGNOSIS — L85.3 XEROSIS CUTIS: ICD-10-CM

## 2024-03-21 DIAGNOSIS — E11.9 TYPE 2 DIABETES MELLITUS W/OUT COMPLICATIONS: ICD-10-CM

## 2024-03-21 PROCEDURE — 99496 TRANSJ CARE MGMT HIGH F2F 7D: CPT | Mod: GC

## 2024-03-21 RX ORDER — SODIUM BICARBONATE 650 MG/1
650 TABLET ORAL 3 TIMES DAILY
Qty: 90 | Refills: 2 | Status: DISCONTINUED | COMMUNITY
Start: 2023-03-06 | End: 2024-03-21

## 2024-03-21 RX ORDER — NIFEDIPINE 90 MG/1
90 TABLET, EXTENDED RELEASE ORAL DAILY
Qty: 90 | Refills: 3 | Status: ACTIVE | COMMUNITY
Start: 2024-03-21 | End: 1900-01-01

## 2024-03-21 RX ORDER — METOPROLOL SUCCINATE 25 MG/1
25 TABLET, EXTENDED RELEASE ORAL
Qty: 90 | Refills: 3 | Status: ACTIVE | COMMUNITY
Start: 2024-03-21 | End: 1900-01-01

## 2024-03-21 RX ORDER — METOPROLOL SUCCINATE 25 MG/1
25 TABLET, EXTENDED RELEASE ORAL DAILY
Qty: 30 | Refills: 4 | Status: DISCONTINUED | COMMUNITY
Start: 2023-08-23 | End: 2024-03-21

## 2024-03-21 RX ORDER — FLUTICASONE PROPIONATE 50 UG/1
50 SPRAY, METERED NASAL TWICE DAILY
Qty: 1 | Refills: 3 | Status: DISCONTINUED | COMMUNITY
Start: 2018-06-13 | End: 2024-03-21

## 2024-03-21 RX ORDER — FLASH GLUCOSE SENSOR
KIT MISCELLANEOUS
Qty: 6 | Refills: 6 | Status: DISCONTINUED | COMMUNITY
Start: 2020-05-12 | End: 2024-03-21

## 2024-03-21 RX ORDER — FLASH GLUCOSE SCANNING READER
EACH MISCELLANEOUS
Qty: 1 | Refills: 6 | Status: DISCONTINUED | COMMUNITY
Start: 2020-05-15 | End: 2024-03-21

## 2024-03-21 RX ORDER — HYDROCORTISONE 1 %
12 CREAM (GRAM) TOPICAL TWICE DAILY
Qty: 2 | Refills: 1 | Status: DISCONTINUED | COMMUNITY
Start: 2023-10-31 | End: 2024-03-21

## 2024-03-21 RX ORDER — BLOOD-GLUCOSE METER
70 EACH MISCELLANEOUS
Qty: 3 | Refills: 6 | Status: DISCONTINUED | COMMUNITY
Start: 2018-04-12 | End: 2024-03-21

## 2024-03-21 RX ORDER — ERGOCALCIFEROL (VITAMIN D2) 50 MCG
50 MCG CAPSULE ORAL DAILY
Qty: 90 | Refills: 3 | Status: ACTIVE | COMMUNITY
Start: 2024-03-21 | End: 1900-01-01

## 2024-03-21 RX ORDER — ATORVASTATIN CALCIUM 10 MG/1
10 TABLET, FILM COATED ORAL DAILY
Qty: 90 | Refills: 3 | Status: ACTIVE | COMMUNITY
Start: 2024-03-21 | End: 1900-01-01

## 2024-03-21 RX ORDER — PHENOL 1.4 %
600 AEROSOL, SPRAY (ML) MUCOUS MEMBRANE TWICE DAILY
Qty: 60 | Refills: 3 | Status: DISCONTINUED | COMMUNITY
Start: 2023-05-11 | End: 2024-03-21

## 2024-03-21 RX ORDER — INSULIN GLARGINE 100 [IU]/ML
100 INJECTION, SOLUTION SUBCUTANEOUS
Qty: 1 | Refills: 1 | Status: DISCONTINUED | COMMUNITY
Start: 2024-03-06 | End: 2024-03-21

## 2024-03-21 RX ORDER — DULAGLUTIDE 1.5 MG/.5ML
1.5 INJECTION, SOLUTION SUBCUTANEOUS
Qty: 4 | Refills: 3 | Status: DISCONTINUED | COMMUNITY
Start: 2022-09-12 | End: 2024-03-21

## 2024-03-21 RX ORDER — PEN NEEDLE, DIABETIC 29 G X1/2"
32G X 4 MM NEEDLE, DISPOSABLE MISCELLANEOUS
Qty: 1 | Refills: 3 | Status: DISCONTINUED | COMMUNITY
Start: 2019-09-13 | End: 2024-03-21

## 2024-03-21 RX ORDER — GABAPENTIN 300 MG/1
300 CAPSULE ORAL TWICE DAILY
Qty: 180 | Refills: 3 | Status: ACTIVE | COMMUNITY
Start: 2022-09-13 | End: 1900-01-01

## 2024-03-21 RX ORDER — DICLOFENAC SODIUM 1% 10 MG/G
1 GEL TOPICAL
Qty: 1 | Refills: 2 | Status: DISCONTINUED | COMMUNITY
Start: 2022-09-23 | End: 2024-03-21

## 2024-03-21 RX ORDER — INSULIN GLARGINE 100 [IU]/ML
100 INJECTION, SOLUTION SUBCUTANEOUS
Qty: 9 | Refills: 5 | Status: DISCONTINUED | COMMUNITY
Start: 2019-09-13 | End: 2024-03-21

## 2024-03-21 RX ORDER — TRAMADOL HYDROCHLORIDE 25 MG/1
25 TABLET, COATED ORAL TWICE DAILY
Qty: 30 | Refills: 0 | Status: DISCONTINUED | COMMUNITY
Start: 2024-01-18 | End: 2024-03-21

## 2024-03-21 RX ORDER — CHLORHEXIDINE GLUCONATE 4 %
325 (65 FE) LIQUID (ML) TOPICAL DAILY
Qty: 30 | Refills: 3 | Status: DISCONTINUED | COMMUNITY
Start: 2023-03-06 | End: 2024-03-21

## 2024-03-21 RX ORDER — SEVELAMER CARBONATE 800 MG/1
800 TABLET, FILM COATED ORAL
Qty: 270 | Refills: 3 | Status: ACTIVE | COMMUNITY
Start: 2023-03-06 | End: 1900-01-01

## 2024-03-21 RX ORDER — HYDRALAZINE HYDROCHLORIDE 50 MG/1
50 TABLET ORAL 3 TIMES DAILY
Qty: 270 | Refills: 3 | Status: ACTIVE | COMMUNITY
Start: 2024-03-21 | End: 1900-01-01

## 2024-03-21 RX ORDER — TRAZODONE HYDROCHLORIDE 50 MG/1
50 TABLET ORAL
Qty: 90 | Refills: 1 | Status: DISCONTINUED | COMMUNITY
Start: 2023-10-31 | End: 2024-03-21

## 2024-03-21 RX ORDER — PREDNISONE 10 MG/1
10 TABLET ORAL
Qty: 90 | Refills: 3 | Status: ACTIVE | COMMUNITY
Start: 2017-10-06 | End: 1900-01-01

## 2024-03-21 RX ORDER — ROSUVASTATIN CALCIUM 40 MG/1
40 TABLET, FILM COATED ORAL
Qty: 90 | Refills: 3 | Status: DISCONTINUED | COMMUNITY
Start: 2021-04-28 | End: 2024-03-21

## 2024-03-21 RX ORDER — AMLODIPINE BESYLATE 10 MG/1
10 TABLET ORAL
Qty: 90 | Refills: 3 | Status: DISCONTINUED | COMMUNITY
Start: 2022-09-13 | End: 2024-03-21

## 2024-03-21 RX ORDER — INSULIN ASPART 100 [IU]/ML
100 INJECTION, SOLUTION INTRAVENOUS; SUBCUTANEOUS
Qty: 1 | Refills: 2 | Status: DISCONTINUED | COMMUNITY
Start: 2021-07-30 | End: 2024-03-21

## 2024-03-24 ENCOUNTER — INPATIENT (INPATIENT)
Facility: HOSPITAL | Age: 54
LOS: 2 days | Discharge: HOME | DRG: 640 | End: 2024-03-27
Attending: STUDENT IN AN ORGANIZED HEALTH CARE EDUCATION/TRAINING PROGRAM | Admitting: STUDENT IN AN ORGANIZED HEALTH CARE EDUCATION/TRAINING PROGRAM
Payer: MEDICARE

## 2024-03-24 VITALS
RESPIRATION RATE: 37 BRPM | OXYGEN SATURATION: 69 % | DIASTOLIC BLOOD PRESSURE: 111 MMHG | SYSTOLIC BLOOD PRESSURE: 232 MMHG | HEART RATE: 112 BPM | WEIGHT: 105.82 LBS | HEIGHT: 60 IN

## 2024-03-24 DIAGNOSIS — J81.0 ACUTE PULMONARY EDEMA: ICD-10-CM

## 2024-03-24 LAB
ALBUMIN SERPL ELPH-MCNC: 2.9 G/DL — LOW (ref 3.5–5)
ALBUMIN SERPL ELPH-MCNC: 3.7 G/DL — SIGNIFICANT CHANGE UP (ref 3.5–5)
ALP SERPL-CCNC: 118 U/L — SIGNIFICANT CHANGE UP (ref 40–120)
ALP SERPL-CCNC: 76 U/L — SIGNIFICANT CHANGE UP (ref 40–120)
ALT FLD-CCNC: 41 U/L DA — SIGNIFICANT CHANGE UP (ref 10–60)
ALT FLD-CCNC: 49 U/L DA — SIGNIFICANT CHANGE UP (ref 10–60)
ANION GAP SERPL CALC-SCNC: 11 MMOL/L — SIGNIFICANT CHANGE UP (ref 5–17)
ANION GAP SERPL CALC-SCNC: 8 MMOL/L — SIGNIFICANT CHANGE UP (ref 5–17)
AST SERPL-CCNC: 30 U/L — SIGNIFICANT CHANGE UP (ref 10–40)
AST SERPL-CCNC: 40 U/L — SIGNIFICANT CHANGE UP (ref 10–40)
BASE EXCESS BLDV CALC-SCNC: 5.6 MMOL/L — SIGNIFICANT CHANGE UP
BASOPHILS # BLD AUTO: 0.05 K/UL — SIGNIFICANT CHANGE UP (ref 0–0.2)
BASOPHILS NFR BLD AUTO: 0.4 % — SIGNIFICANT CHANGE UP (ref 0–2)
BILIRUB SERPL-MCNC: 0.4 MG/DL — SIGNIFICANT CHANGE UP (ref 0.2–1.2)
BILIRUB SERPL-MCNC: 0.4 MG/DL — SIGNIFICANT CHANGE UP (ref 0.2–1.2)
BLOOD GAS COMMENTS, VENOUS: SIGNIFICANT CHANGE UP
BUN SERPL-MCNC: 14 MG/DL — SIGNIFICANT CHANGE UP (ref 7–18)
BUN SERPL-MCNC: 32 MG/DL — HIGH (ref 7–18)
CA-I SERPL-SCNC: SIGNIFICANT CHANGE UP MMOL/L (ref 1.15–1.33)
CALCIUM SERPL-MCNC: 8.5 MG/DL — SIGNIFICANT CHANGE UP (ref 8.4–10.5)
CALCIUM SERPL-MCNC: 9 MG/DL — SIGNIFICANT CHANGE UP (ref 8.4–10.5)
CHLORIDE SERPL-SCNC: 98 MMOL/L — SIGNIFICANT CHANGE UP (ref 96–108)
CHLORIDE SERPL-SCNC: 99 MMOL/L — SIGNIFICANT CHANGE UP (ref 96–108)
CO2 SERPL-SCNC: 28 MMOL/L — SIGNIFICANT CHANGE UP (ref 22–31)
CO2 SERPL-SCNC: 29 MMOL/L — SIGNIFICANT CHANGE UP (ref 22–31)
CREAT SERPL-MCNC: 2.62 MG/DL — HIGH (ref 0.5–1.3)
CREAT SERPL-MCNC: 4.73 MG/DL — HIGH (ref 0.5–1.3)
EGFR: 11 ML/MIN/1.73M2 — LOW
EGFR: 21 ML/MIN/1.73M2 — LOW
EOSINOPHIL # BLD AUTO: 0.33 K/UL — SIGNIFICANT CHANGE UP (ref 0–0.5)
EOSINOPHIL NFR BLD AUTO: 2.7 % — SIGNIFICANT CHANGE UP (ref 0–6)
FLUAV AG NPH QL: DETECTED
FLUAV H1 2009 PAND RNA SPEC QL NAA+PROBE: DETECTED
FLUBV AG NPH QL: SIGNIFICANT CHANGE UP
GAS PNL BLDV: 137 MMOL/L — SIGNIFICANT CHANGE UP (ref 136–145)
GAS PNL BLDV: SIGNIFICANT CHANGE UP
GAS PNL BLDV: SIGNIFICANT CHANGE UP
GLUCOSE SERPL-MCNC: 154 MG/DL — HIGH (ref 70–99)
GLUCOSE SERPL-MCNC: 260 MG/DL — HIGH (ref 70–99)
HCO3 BLDV-SCNC: 31 MMOL/L — HIGH (ref 22–29)
HCT VFR BLD CALC: 30.6 % — LOW (ref 34.5–45)
HCT VFR BLD CALC: 38.2 % — SIGNIFICANT CHANGE UP (ref 34.5–45)
HGB BLD-MCNC: 12.6 G/DL — SIGNIFICANT CHANGE UP (ref 11.5–15.5)
HGB BLD-MCNC: 9.8 G/DL — LOW (ref 11.5–15.5)
HIV 1 & 2 AB SERPL IA.RAPID: SIGNIFICANT CHANGE UP
HOROWITZ INDEX BLDV+IHG-RTO: 80 — SIGNIFICANT CHANGE UP
IMM GRANULOCYTES NFR BLD AUTO: 1.9 % — HIGH (ref 0–0.9)
LACTATE BLDV-MCNC: 2.2 MMOL/L — HIGH (ref 0.5–2)
LACTATE SERPL-SCNC: 2.5 MMOL/L — HIGH (ref 0.7–2)
LACTATE SERPL-SCNC: 2.6 MMOL/L — HIGH (ref 0.7–2)
LACTATE SERPL-SCNC: 3.4 MMOL/L — HIGH (ref 0.7–2)
LYMPHOCYTES # BLD AUTO: 2.48 K/UL — SIGNIFICANT CHANGE UP (ref 1–3.3)
LYMPHOCYTES # BLD AUTO: 20.5 % — SIGNIFICANT CHANGE UP (ref 13–44)
MAGNESIUM SERPL-MCNC: 1.9 MG/DL — SIGNIFICANT CHANGE UP (ref 1.6–2.6)
MCHC RBC-ENTMCNC: 30.4 PG — SIGNIFICANT CHANGE UP (ref 27–34)
MCHC RBC-ENTMCNC: 30.9 PG — SIGNIFICANT CHANGE UP (ref 27–34)
MCHC RBC-ENTMCNC: 32 GM/DL — SIGNIFICANT CHANGE UP (ref 32–36)
MCHC RBC-ENTMCNC: 33 GM/DL — SIGNIFICANT CHANGE UP (ref 32–36)
MCV RBC AUTO: 93.6 FL — SIGNIFICANT CHANGE UP (ref 80–100)
MCV RBC AUTO: 95 FL — SIGNIFICANT CHANGE UP (ref 80–100)
MONOCYTES # BLD AUTO: 0.59 K/UL — SIGNIFICANT CHANGE UP (ref 0–0.9)
MONOCYTES NFR BLD AUTO: 4.9 % — SIGNIFICANT CHANGE UP (ref 2–14)
NEUTROPHILS # BLD AUTO: 8.41 K/UL — HIGH (ref 1.8–7.4)
NEUTROPHILS NFR BLD AUTO: 69.6 % — SIGNIFICANT CHANGE UP (ref 43–77)
NRBC # BLD: 0 /100 WBCS — SIGNIFICANT CHANGE UP (ref 0–0)
NRBC # BLD: 0 /100 WBCS — SIGNIFICANT CHANGE UP (ref 0–0)
PCO2 BLDV: 48 MMHG — HIGH (ref 39–42)
PH BLDV: 7.42 — SIGNIFICANT CHANGE UP (ref 7.32–7.43)
PHOSPHATE SERPL-MCNC: 2.4 MG/DL — LOW (ref 2.5–4.5)
PLATELET # BLD AUTO: 239 K/UL — SIGNIFICANT CHANGE UP (ref 150–400)
PLATELET # BLD AUTO: 364 K/UL — SIGNIFICANT CHANGE UP (ref 150–400)
PO2 BLDV: 38 MMHG — SIGNIFICANT CHANGE UP
POTASSIUM BLDV-SCNC: 4.2 MMOL/L — SIGNIFICANT CHANGE UP (ref 3.5–5.1)
POTASSIUM SERPL-MCNC: 4.2 MMOL/L — SIGNIFICANT CHANGE UP (ref 3.5–5.3)
POTASSIUM SERPL-MCNC: 4.2 MMOL/L — SIGNIFICANT CHANGE UP (ref 3.5–5.3)
POTASSIUM SERPL-SCNC: 4.2 MMOL/L — SIGNIFICANT CHANGE UP (ref 3.5–5.3)
POTASSIUM SERPL-SCNC: 4.2 MMOL/L — SIGNIFICANT CHANGE UP (ref 3.5–5.3)
PROT SERPL-MCNC: 6.5 G/DL — SIGNIFICANT CHANGE UP (ref 6–8.3)
PROT SERPL-MCNC: 8.2 G/DL — SIGNIFICANT CHANGE UP (ref 6–8.3)
RAPID RVP RESULT: DETECTED
RBC # BLD: 3.22 M/UL — LOW (ref 3.8–5.2)
RBC # BLD: 4.08 M/UL — SIGNIFICANT CHANGE UP (ref 3.8–5.2)
RBC # FLD: 14.3 % — SIGNIFICANT CHANGE UP (ref 10.3–14.5)
RBC # FLD: 14.6 % — HIGH (ref 10.3–14.5)
SAO2 % BLDV: 65.8 % — SIGNIFICANT CHANGE UP
SARS-COV-2 RNA SPEC QL NAA+PROBE: SIGNIFICANT CHANGE UP
SARS-COV-2 RNA SPEC QL NAA+PROBE: SIGNIFICANT CHANGE UP
SODIUM SERPL-SCNC: 136 MMOL/L — SIGNIFICANT CHANGE UP (ref 135–145)
SODIUM SERPL-SCNC: 137 MMOL/L — SIGNIFICANT CHANGE UP (ref 135–145)
WBC # BLD: 11.92 K/UL — HIGH (ref 3.8–10.5)
WBC # BLD: 12.09 K/UL — HIGH (ref 3.8–10.5)
WBC # FLD AUTO: 11.92 K/UL — HIGH (ref 3.8–10.5)
WBC # FLD AUTO: 12.09 K/UL — HIGH (ref 3.8–10.5)

## 2024-03-24 PROCEDURE — 99291 CRITICAL CARE FIRST HOUR: CPT | Mod: GC

## 2024-03-24 PROCEDURE — 99291 CRITICAL CARE FIRST HOUR: CPT

## 2024-03-24 PROCEDURE — 71045 X-RAY EXAM CHEST 1 VIEW: CPT | Mod: 26

## 2024-03-24 PROCEDURE — 71045 X-RAY EXAM CHEST 1 VIEW: CPT | Mod: 26,77

## 2024-03-24 RX ORDER — DIAZEPAM 5 MG
5 TABLET ORAL ONCE
Refills: 0 | Status: DISCONTINUED | OUTPATIENT
Start: 2024-03-24 | End: 2024-03-24

## 2024-03-24 RX ORDER — GABAPENTIN 400 MG/1
300 CAPSULE ORAL
Refills: 0 | Status: DISCONTINUED | OUTPATIENT
Start: 2024-03-24 | End: 2024-03-27

## 2024-03-24 RX ORDER — OXYCODONE HYDROCHLORIDE 5 MG/1
5 TABLET ORAL ONCE
Refills: 0 | Status: DISCONTINUED | OUTPATIENT
Start: 2024-03-24 | End: 2024-03-24

## 2024-03-24 RX ORDER — ONDANSETRON 8 MG/1
4 TABLET, FILM COATED ORAL ONCE
Refills: 0 | Status: COMPLETED | OUTPATIENT
Start: 2024-03-24 | End: 2024-03-24

## 2024-03-24 RX ORDER — HYDRALAZINE HCL 50 MG
50 TABLET ORAL THREE TIMES A DAY
Refills: 0 | Status: DISCONTINUED | OUTPATIENT
Start: 2024-03-24 | End: 2024-03-27

## 2024-03-24 RX ORDER — NIFEDIPINE 30 MG
60 TABLET, EXTENDED RELEASE 24 HR ORAL DAILY
Refills: 0 | Status: DISCONTINUED | OUTPATIENT
Start: 2024-03-24 | End: 2024-03-26

## 2024-03-24 RX ORDER — INSULIN LISPRO 100/ML
VIAL (ML) SUBCUTANEOUS
Refills: 0 | Status: DISCONTINUED | OUTPATIENT
Start: 2024-03-24 | End: 2024-03-27

## 2024-03-24 RX ORDER — FERROUS SULFATE 325(65) MG
325 TABLET ORAL DAILY
Refills: 0 | Status: DISCONTINUED | OUTPATIENT
Start: 2024-03-24 | End: 2024-03-27

## 2024-03-24 RX ORDER — ALBUTEROL 90 UG/1
10 AEROSOL, METERED ORAL ONCE
Refills: 0 | Status: COMPLETED | OUTPATIENT
Start: 2024-03-24 | End: 2024-03-24

## 2024-03-24 RX ORDER — DEXTROSE 50 % IN WATER 50 %
25 SYRINGE (ML) INTRAVENOUS ONCE
Refills: 0 | Status: DISCONTINUED | OUTPATIENT
Start: 2024-03-24 | End: 2024-03-25

## 2024-03-24 RX ORDER — SODIUM CHLORIDE 9 MG/ML
1000 INJECTION, SOLUTION INTRAVENOUS
Refills: 0 | Status: DISCONTINUED | OUTPATIENT
Start: 2024-03-24 | End: 2024-03-25

## 2024-03-24 RX ORDER — INSULIN LISPRO 100/ML
VIAL (ML) SUBCUTANEOUS EVERY 6 HOURS
Refills: 0 | Status: DISCONTINUED | OUTPATIENT
Start: 2024-03-24 | End: 2024-03-24

## 2024-03-24 RX ORDER — CALCIUM CHLORIDE
2000 POWDER (GRAM) MISCELLANEOUS ONCE
Refills: 0 | Status: COMPLETED | OUTPATIENT
Start: 2024-03-24 | End: 2024-03-24

## 2024-03-24 RX ORDER — DEXTROSE 50 % IN WATER 50 %
50 SYRINGE (ML) INTRAVENOUS ONCE
Refills: 0 | Status: COMPLETED | OUTPATIENT
Start: 2024-03-24 | End: 2024-03-24

## 2024-03-24 RX ORDER — METOPROLOL TARTRATE 50 MG
25 TABLET ORAL DAILY
Refills: 0 | Status: DISCONTINUED | OUTPATIENT
Start: 2024-03-24 | End: 2024-03-27

## 2024-03-24 RX ORDER — HEPARIN SODIUM 5000 [USP'U]/ML
5000 INJECTION INTRAVENOUS; SUBCUTANEOUS EVERY 8 HOURS
Refills: 0 | Status: DISCONTINUED | OUTPATIENT
Start: 2024-03-24 | End: 2024-03-27

## 2024-03-24 RX ORDER — PANTOPRAZOLE SODIUM 20 MG/1
40 TABLET, DELAYED RELEASE ORAL DAILY
Refills: 0 | Status: DISCONTINUED | OUTPATIENT
Start: 2024-03-24 | End: 2024-03-24

## 2024-03-24 RX ORDER — NITROGLYCERIN 6.5 MG
5 CAPSULE, EXTENDED RELEASE ORAL
Qty: 50 | Refills: 0 | Status: DISCONTINUED | OUTPATIENT
Start: 2024-03-24 | End: 2024-03-24

## 2024-03-24 RX ORDER — SODIUM CHLORIDE 9 MG/ML
50 INJECTION, SOLUTION INTRAVENOUS ONCE
Refills: 0 | Status: COMPLETED | OUTPATIENT
Start: 2024-03-24 | End: 2024-03-24

## 2024-03-24 RX ORDER — FUROSEMIDE 40 MG
40 TABLET ORAL ONCE
Refills: 0 | Status: COMPLETED | OUTPATIENT
Start: 2024-03-24 | End: 2024-03-24

## 2024-03-24 RX ORDER — SODIUM BICARBONATE 1 MEQ/ML
0.78 SYRINGE (ML) INTRAVENOUS
Qty: 150 | Refills: 0 | Status: DISCONTINUED | OUTPATIENT
Start: 2024-03-24 | End: 2024-03-24

## 2024-03-24 RX ORDER — SODIUM POLYSTYRENE SULFONATE 4.1 MEQ/G
15 POWDER, FOR SUSPENSION ORAL EVERY 6 HOURS
Refills: 0 | Status: DISCONTINUED | OUTPATIENT
Start: 2024-03-24 | End: 2024-03-24

## 2024-03-24 RX ORDER — ACETAMINOPHEN 500 MG
725 TABLET ORAL ONCE
Refills: 0 | Status: DISCONTINUED | OUTPATIENT
Start: 2024-03-24 | End: 2024-03-24

## 2024-03-24 RX ORDER — DEXTROSE 50 % IN WATER 50 %
12.5 SYRINGE (ML) INTRAVENOUS ONCE
Refills: 0 | Status: DISCONTINUED | OUTPATIENT
Start: 2024-03-24 | End: 2024-03-25

## 2024-03-24 RX ORDER — GLUCAGON INJECTION, SOLUTION 0.5 MG/.1ML
1 INJECTION, SOLUTION SUBCUTANEOUS ONCE
Refills: 0 | Status: DISCONTINUED | OUTPATIENT
Start: 2024-03-24 | End: 2024-03-25

## 2024-03-24 RX ORDER — ACETAMINOPHEN 500 MG
650 TABLET ORAL EVERY 6 HOURS
Refills: 0 | Status: DISCONTINUED | OUTPATIENT
Start: 2024-03-24 | End: 2024-03-27

## 2024-03-24 RX ORDER — MORPHINE SULFATE 50 MG/1
2 CAPSULE, EXTENDED RELEASE ORAL ONCE
Refills: 0 | Status: DISCONTINUED | OUTPATIENT
Start: 2024-03-24 | End: 2024-03-24

## 2024-03-24 RX ORDER — DEXTROSE 50 % IN WATER 50 %
15 SYRINGE (ML) INTRAVENOUS ONCE
Refills: 0 | Status: DISCONTINUED | OUTPATIENT
Start: 2024-03-24 | End: 2024-03-25

## 2024-03-24 RX ORDER — SEVELAMER CARBONATE 2400 MG/1
1600 POWDER, FOR SUSPENSION ORAL THREE TIMES A DAY
Refills: 0 | Status: DISCONTINUED | OUTPATIENT
Start: 2024-03-24 | End: 2024-03-27

## 2024-03-24 RX ORDER — LIDOCAINE 4 G/100G
1 CREAM TOPICAL DAILY
Refills: 0 | Status: DISCONTINUED | OUTPATIENT
Start: 2024-03-25 | End: 2024-03-27

## 2024-03-24 RX ORDER — INSULIN HUMAN 100 [IU]/ML
10 INJECTION, SOLUTION SUBCUTANEOUS ONCE
Refills: 0 | Status: COMPLETED | OUTPATIENT
Start: 2024-03-24 | End: 2024-03-24

## 2024-03-24 RX ORDER — HYDROMORPHONE HYDROCHLORIDE 2 MG/ML
0.5 INJECTION INTRAMUSCULAR; INTRAVENOUS; SUBCUTANEOUS ONCE
Refills: 0 | Status: DISCONTINUED | OUTPATIENT
Start: 2024-03-24 | End: 2024-03-24

## 2024-03-24 RX ORDER — LIDOCAINE 4 G/100G
1 CREAM TOPICAL ONCE
Refills: 0 | Status: COMPLETED | OUTPATIENT
Start: 2024-03-24 | End: 2024-03-24

## 2024-03-24 RX ORDER — ATORVASTATIN CALCIUM 80 MG/1
10 TABLET, FILM COATED ORAL AT BEDTIME
Refills: 0 | Status: DISCONTINUED | OUTPATIENT
Start: 2024-03-24 | End: 2024-03-27

## 2024-03-24 RX ADMIN — Medication 650 MILLIGRAM(S): at 17:46

## 2024-03-24 RX ADMIN — Medication 50 MILLIGRAM(S): at 13:39

## 2024-03-24 RX ADMIN — Medication 650 MILLIGRAM(S): at 18:12

## 2024-03-24 RX ADMIN — Medication 60 MILLIGRAM(S): at 14:21

## 2024-03-24 RX ADMIN — Medication 4: at 21:57

## 2024-03-24 RX ADMIN — MORPHINE SULFATE 2 MILLIGRAM(S): 50 CAPSULE, EXTENDED RELEASE ORAL at 06:34

## 2024-03-24 RX ADMIN — INSULIN HUMAN 10 UNIT(S): 100 INJECTION, SOLUTION SUBCUTANEOUS at 05:35

## 2024-03-24 RX ADMIN — LIDOCAINE 1 PATCH: 4 CREAM TOPICAL at 18:13

## 2024-03-24 RX ADMIN — ALBUTEROL 10 MILLIGRAM(S): 90 AEROSOL, METERED ORAL at 05:51

## 2024-03-24 RX ADMIN — Medication 0.5 MILLIGRAM(S): at 05:12

## 2024-03-24 RX ADMIN — ONDANSETRON 4 MILLIGRAM(S): 8 TABLET, FILM COATED ORAL at 05:35

## 2024-03-24 RX ADMIN — Medication 50 MILLIGRAM(S): at 08:18

## 2024-03-24 RX ADMIN — Medication 5 MILLIGRAM(S): at 06:35

## 2024-03-24 RX ADMIN — Medication 325 MILLIGRAM(S): at 13:40

## 2024-03-24 RX ADMIN — GABAPENTIN 300 MILLIGRAM(S): 400 CAPSULE ORAL at 17:46

## 2024-03-24 RX ADMIN — HEPARIN SODIUM 5000 UNIT(S): 5000 INJECTION INTRAVENOUS; SUBCUTANEOUS at 21:53

## 2024-03-24 RX ADMIN — HEPARIN SODIUM 5000 UNIT(S): 5000 INJECTION INTRAVENOUS; SUBCUTANEOUS at 13:40

## 2024-03-24 RX ADMIN — Medication 40 MILLIGRAM(S): at 05:35

## 2024-03-24 RX ADMIN — Medication 50 MILLILITER(S): at 05:36

## 2024-03-24 RX ADMIN — Medication 25 MILLIGRAM(S): at 09:15

## 2024-03-24 RX ADMIN — HYDROMORPHONE HYDROCHLORIDE 0.5 MILLIGRAM(S): 2 INJECTION INTRAMUSCULAR; INTRAVENOUS; SUBCUTANEOUS at 09:41

## 2024-03-24 RX ADMIN — Medication 30 MILLIGRAM(S): at 13:40

## 2024-03-24 RX ADMIN — OXYCODONE HYDROCHLORIDE 5 MILLIGRAM(S): 5 TABLET ORAL at 21:03

## 2024-03-24 RX ADMIN — Medication 1.5 MICROGRAM(S)/MIN: at 06:03

## 2024-03-24 RX ADMIN — OXYCODONE HYDROCHLORIDE 5 MILLIGRAM(S): 5 TABLET ORAL at 20:05

## 2024-03-24 RX ADMIN — Medication 50 MILLIGRAM(S): at 21:53

## 2024-03-24 RX ADMIN — Medication 250 MEQ/KG/HR: at 05:45

## 2024-03-24 RX ADMIN — LIDOCAINE 1 PATCH: 4 CREAM TOPICAL at 06:34

## 2024-03-24 RX ADMIN — SEVELAMER CARBONATE 1600 MILLIGRAM(S): 2400 POWDER, FOR SUSPENSION ORAL at 13:40

## 2024-03-24 RX ADMIN — SEVELAMER CARBONATE 1600 MILLIGRAM(S): 2400 POWDER, FOR SUSPENSION ORAL at 21:53

## 2024-03-24 RX ADMIN — HYDROMORPHONE HYDROCHLORIDE 0.5 MILLIGRAM(S): 2 INJECTION INTRAMUSCULAR; INTRAVENOUS; SUBCUTANEOUS at 10:14

## 2024-03-24 RX ADMIN — Medication 1: at 17:47

## 2024-03-24 RX ADMIN — ATORVASTATIN CALCIUM 10 MILLIGRAM(S): 80 TABLET, FILM COATED ORAL at 21:53

## 2024-03-24 NOTE — H&P ADULT - ASSESSMENT
ASSESSMENT  53 year old female from home ambulates independently, with PMH of HTN, DM2, ESRD on HD M/W/F (last session 3/22 )sarcoidosis on prednisone, anemia, seizure disorder (no longer on meds) who presented to the ED for shortness of breath admitted for AHRF secondary to pulmonary edema due to fluid overload in the setting of ESRD requiring urgent hemodialysis           _________CNS___________  #No active issues    #Seizure Disorder  -not on any meds   _________CVS___________  #Hypertensive Emergency  -started on Nitroglycerin drip in ED. MAP should be reduced gradually by approximately 25% in the next 24 hours. Target blood pressure of <180/<120 mmHg for the first hour and <160/<110 mmHg for the next 23 hours.  -hold home BP meds     _________RESP__________  #AHRF  -On Non rebreather saturating 100%  -Urgent HD Dr. Estrada consulted     #Sarcoidosis  -c/w 10mg prednisone   ___________GI____________  #No active issues    ________ RENAL__________  #ESRD MWF  -last HD was on Friday  -Dr. Estrada consulted  -Pt states she no longer makes any urine      __________MSK___________  #No active issues     ___________ID____________  #No active issues     _________ENDO__________  #DM  -SSI     ______HEME/ONC_______  #Leukocytosis  likely reactive, afebrile denies any recent illness     _________SKIN____________  #No active issues     ________PROPHY_______  #DVT heparin sub q  #GI PPI      ______GOC/DISPO___________  ICU          ASSESSMENT  53 year old female from home ambulates independently, with PMH of HTN, DM2, ESRD on HD M/W/F (last session 3/22 )sarcoidosis on prednisone, anemia, seizure disorder (no longer on meds) who presented to the ED for shortness of breath admitted for AHRF secondary to pulmonary edema due to fluid overload in the setting of ESRD requiring urgent hemodialysis           _________CNS___________  #No active issues    #Seizure Disorder  -not on any meds   _________CVS___________  #Hypertensive Emergency  -started on Nitroglycerin drip in ED. MAP should be reduced gradually by approximately 25% in the next 24 hours. Target blood pressure of <180/<120 mmHg for the first hour and <160/<110 mmHg for the next 23 hours.  -hold home BP meds   -EKG NSR   _________RESP__________  #AHRF  -On Non rebreather saturating 100%  -Urgent HD Dr. Estrada consulted     #Sarcoidosis  -c/w 10mg prednisone   ___________GI____________  #No active issues    ________ RENAL__________  #ESRD MWF  -last HD was on Friday  -Dr. Estrada consulted  -Pt states she no longer makes any urine      __________MSK___________  #No active issues     ___________ID____________  #No active issues     _________ENDO__________  #DM  -SSI     ______HEME/ONC_______  #Leukocytosis  likely reactive, afebrile denies any recent illness     _________SKIN____________  #No active issues     ________PROPHY_______  #DVT heparin sub q  #GI PPI      ______GOC/DISPO___________  ICU          ASSESSMENT  53 year old female from home ambulates independently, with PMH of HTN, DM2, ESRD on HD M/W/F (last session 3/22 )sarcoidosis on prednisone, anemia, seizure disorder (no longer on meds) who presented to the ED for shortness of breath admitted for AHRF secondary to pulmonary edema due to fluid overload in the setting of ESRD requiring urgent hemodialysis           _________CNS___________  #No active issues    #Seizure Disorder  -not on any meds   _________CVS___________  #Hypertensive Emergency  -started on Nitroglycerin drip in ED. MAP should be reduced gradually by approximately 25% in the next 24 hours. Target blood pressure of <180/<120 mmHg for the first hour and <160/<110 mmHg for the next 23 hours.  -hold home BP meds   -EKG NSR   _________RESP__________  #AHRF  -On Non rebreather saturating 100%  -Urgent HD Dr. Estrada consulted   -lactate 2.6. F/u repeat post HD     #Sarcoidosis  -c/w 10mg prednisone   ___________GI____________  #No active issues    ________ RENAL__________  #ESRD MWF  -last HD was on Friday  -Dr. Estrada consulted, planned for urgent HD via Right shiley catheter   -Pt states she no longer makes any urine      __________MSK___________  #No active issues     ___________ID____________  #No active issues     _________ENDO__________  #DM  -SSI     ______HEME/ONC_______  #Leukocytosis  likely reactive, afebrile denies any recent illness     _________SKIN____________  #No active issues     ________PROPHY_______  #DVT heparin sub q  #GI PPI      ______GOC/DISPO___________  ICU          ASSESSMENT  53 year old female from home ambulates independently, with PMH of HTN, DM2, ESRD on HD M/W/F (last session 3/22 )sarcoidosis on prednisone, anemia, seizure disorder (no longer on meds) who presented to the ED for shortness of breath admitted for AHRF secondary to pulmonary edema due to fluid overload in the setting of ESRD requiring urgent hemodialysis           _________CNS___________  #No active issues    #Seizure Disorder  -not on any meds   _________CVS___________  #Hypertensive Emergency  -started on Nitroglycerin drip in ED. MAP should be reduced gradually by approximately 25% in the next 24 hours. Target blood pressure of <180/<120 mmHg for the first hour and <160/<110 mmHg for the next 23 hours.  -hold home BP meds   -EKG NSR   _________RESP__________  #AHRF  -On Non rebreather saturating 100%  -Patient refusing non invasive ventilatory support   -Urgent HD Dr. Estrada consulted   -lactate 2.6. F/u repeat post HD     #Sarcoidosis  -c/w 10mg prednisone   ___________GI____________  #No active issues    ________ RENAL__________  #ESRD MWF  -last HD was on Friday  -Dr. Estrada consulted, planned for urgent HD via Right shiley catheter   -Pt states she no longer makes any urine      __________MSK___________  #No active issues     ___________ID____________  #No active issues     _________ENDO__________  #DM  -SSI     ______HEME/ONC_______  #Leukocytosis  likely reactive, afebrile denies any recent illness     _________SKIN____________  #No active issues     ________PROPHY_______  #DVT heparin sub q  #GI PPI      ______GOC/DISPO___________  ICU      No restrictions

## 2024-03-24 NOTE — PROVIDER CONTACT NOTE (CRITICAL VALUE NOTIFICATION) - TEST AND RESULT REPORTED:
Patient notified of results below. Patient verbalizes understanding and has no further questions.      Lactate 2.7

## 2024-03-24 NOTE — H&P ADULT - HISTORY OF PRESENT ILLNESS
HPI: 53 year old female from home ambulates independently, with PMH of HTN, DM2, ESRD on HD M/W/F (last session 3/22 )sarcoidosis on prednisone, anemia, seizure disorder (no longer on meds) who presented to the ED for shortness of breath. Patient states she started feeling short of breath this afternoon and was having difficulty speaking full sentences. She reports this happening to her often on Sundays with her last admission on 3/10/2024 for similar presentation. She denies making any urine but is still taking lasix. She denies any fevers, chills, headaches, dizziness, chest pain, cough, abd pain, n/v, diarrhea, constipation, hematuria, dysuria.    In ED VS: , /111, RR 37, Spo2 69%RA  Chest xray: b/l Pulm edema  s/p .5 Ativan, 10 U insulin, 15g Kayexalate, 4mg Zofran, 40mg IV lasix, Nitroglycerin drip       PAST MEDICAL & SURGICAL HISTORY:  Sarcoidosis      Diabetes      Hypertension      Hyperlipidemia      Chronic kidney disease (CKD), stage III (moderate)      Seizure disorder      No significant past surgical history          SOCIAL HX: Denies any smoking, etoh, or drug use.     FAMILY HISTORY:  Family history of diabetes mellitus in first degree relative    ROS:  See HPI     Allergies    No Known Allergies    Intolerances          PHYSICAL EXAM    ICU Vital Signs Last 24 Hrs  T(C): --  T(F): --  HR: 112 (24 Mar 2024 04:53) (112 - 112)  BP: 232/111 (24 Mar 2024 04:53) (232/111 - 232/111)  BP(mean): --  ABP: --  ABP(mean): --  RR: 37 (24 Mar 2024 04:53) (37 - 37)  SpO2: 69% (24 Mar 2024 04:53) (69% - 69%)    O2 Parameters below as of 24 Mar 2024 04:53  Patient On (Oxygen Delivery Method): room air            General: Not in distress  HEENT:  MALKA              Lymphatic system: No LN  Lungs: Bilateral BS  Cardiovascular: Regular  Gastrointestinal: Soft, Positive BS  Musculoskeletal: No clubbing.  Moves all extremities.    Skin: Warm.  Intact  Neurological: No motor or sensory deficit         LABS:                          12.6   12.09 )-----------( 364      ( 24 Mar 2024 05:15 )             38.2                                               03-24    137  |  98  |  x   ----------------------------<  260<H>  4.2   |  28  |  x     Ca    9.0      24 Mar 2024 05:15    TPro  x   /  Alb  3.7  /  TBili  x   /  DBili  x   /  AST  x   /  ALT  x   /  AlkPhos  x   03-24                                             Urinalysis Basic - ( 24 Mar 2024 05:15 )    Color: x / Appearance: x / SG: x / pH: x  Gluc: 260 mg/dL / Ketone: x  / Bili: x / Urobili: x   Blood: x / Protein: x / Nitrite: x   Leuk Esterase: x / RBC: x / WBC x   Sq Epi: x / Non Sq Epi: x / Bacteria: x                                                  LIVER FUNCTIONS - ( 24 Mar 2024 05:15 )  Alb: 3.7 g/dL / Pro: x     / ALK PHOS: x     / ALT: x     / AST: x     / GGT: x                                                                                                                                       CXR:    ECHO:    MEDICATIONS  (STANDING):  calcium chloride Injectable 2000 milliGRAM(s) IV Push once  dextrose 5% Bolus 50 milliLiter(s) IV Bolus Once  nitroglycerin  Infusion 5 MICROgram(s)/Min (1.5 mL/Hr) IV Continuous <Continuous>  sodium bicarbonate  Infusion 0.781 mEq/kG/Hr (250 mL/Hr) IV Continuous <Continuous>  sodium polystyrene sulfonate Suspension 15 Gram(s) Oral every 6 hours    MEDICATIONS  (PRN):         HPI: 53 year old female from home ambulates independently, with PMH of HTN, DM2, ESRD on HD M/W/F (last session 3/22 )sarcoidosis on prednisone, anemia, seizure disorder (no longer on meds) who presented to the ED for shortness of breath. Patient states she started feeling short of breath this afternoon and was having difficulty speaking full sentences. She reports this happening to her often on Sundays with her last admission on 3/10/2024 for similar presentation. She denies making any urine but is still taking lasix. She denies any fevers, chills, headaches, dizziness, chest pain, cough, abd pain, n/v, diarrhea, constipation, hematuria, dysuria.    In ED VS: , /111, RR 37, Spo2 69%RA  Chest xray: b/l Pulm edema  s/p .5 Ativan, 10 U insulin, 15g Kayexalate, 4mg Zofran, 40mg IV lasix, Nitroglycerin drip       PAST MEDICAL & SURGICAL HISTORY:  Sarcoidosis      Diabetes      Hypertension      Hyperlipidemia      Chronic kidney disease (CKD), stage III (moderate)      Seizure disorder      No significant past surgical history          SOCIAL HX: Denies any smoking, etoh, or drug use.     FAMILY HISTORY:  Family history of diabetes mellitus in first degree relative    ROS:  See HPI     Allergies    No Known Allergies    Intolerances          PHYSICAL EXAM    ICU Vital Signs Last 24 Hrs  T(C): --  T(F): --  HR: 112 (24 Mar 2024 04:53) (112 - 112)  BP: 232/111 (24 Mar 2024 04:53) (232/111 - 232/111)  BP(mean): --  ABP: --  ABP(mean): --  RR: 37 (24 Mar 2024 04:53) (37 - 37)  SpO2: 69% (24 Mar 2024 04:53) (69% - 69%)    O2 Parameters below as of 24 Mar 2024 04:53  Patient On (Oxygen Delivery Method): room air            General: elderly female lying in bed with Nonrebreather in moderate respiratory distress   HEENT:  MALKA              Lymphatic system: No LN  Lungs: crackles in both lung fields bilaterally   Cardiovascular: tachycardic   Gastrointestinal: Soft, Positive BS  Musculoskeletal: No clubbing.  Moves all extremities.    Skin: Warm.  Intact  Neurological: No motor or sensory deficit         LABS:                          12.6   12.09 )-----------( 364      ( 24 Mar 2024 05:15 )             38.2                                               03-24    137  |  98  |  x   ----------------------------<  260<H>  4.2   |  28  |  x     Ca    9.0      24 Mar 2024 05:15    TPro  x   /  Alb  3.7  /  TBili  x   /  DBili  x   /  AST  x   /  ALT  x   /  AlkPhos  x   03-24                                             Urinalysis Basic - ( 24 Mar 2024 05:15 )    Color: x / Appearance: x / SG: x / pH: x  Gluc: 260 mg/dL / Ketone: x  / Bili: x / Urobili: x   Blood: x / Protein: x / Nitrite: x   Leuk Esterase: x / RBC: x / WBC x   Sq Epi: x / Non Sq Epi: x / Bacteria: x                                                  LIVER FUNCTIONS - ( 24 Mar 2024 05:15 )  Alb: 3.7 g/dL / Pro: x     / ALK PHOS: x     / ALT: x     / AST: x     / GGT: x                                                                                                                                       CXR:    ECHO:    MEDICATIONS  (STANDING):  calcium chloride Injectable 2000 milliGRAM(s) IV Push once  dextrose 5% Bolus 50 milliLiter(s) IV Bolus Once  nitroglycerin  Infusion 5 MICROgram(s)/Min (1.5 mL/Hr) IV Continuous <Continuous>  sodium bicarbonate  Infusion 0.781 mEq/kG/Hr (250 mL/Hr) IV Continuous <Continuous>  sodium polystyrene sulfonate Suspension 15 Gram(s) Oral every 6 hours    MEDICATIONS  (PRN):

## 2024-03-24 NOTE — ED ADULT NURSE NOTE - NSFALLRISKINTERV_ED_ALL_ED

## 2024-03-24 NOTE — ED PROVIDER NOTE - OBJECTIVE STATEMENT
52-year-old female with hypertension diabetes end-stage renal disease on dialysis Monday Wednesday Friday presents with acute shortness of breath.  Patient's oxygen when she arrived here was 69%.  Her daughter at the bedside states she gets cutely short of breath on Sundays often.  She denies fevers chills sputum production.  She has been coughing.  She did not have any change in her dialysis course on Friday.

## 2024-03-24 NOTE — ED PROVIDER NOTE - PHYSICAL EXAMINATION
General: severe distress, speaking 1-2 works  HEENT: normocephalic, atraumatic   Respiratory: +++ work of breathing  MSK: no swelling or tenderness of lower extremities, moving all extremities spontaneously   Skin: warm, dry  Neuro: A&Ox3, cranial nerves II-XII intact, 5/5 strength in all extremities, no sensory deficits, normal gait   Psych: appropriate affect

## 2024-03-24 NOTE — PATIENT PROFILE ADULT - FALL HARM RISK - HARM RISK INTERVENTIONS
Assistance with ambulation/Assistance OOB with selected safe patient handling equipment/Communicate Risk of Fall with Harm to all staff/Discuss with provider need for PT consult/Monitor gait and stability/Reinforce activity limits and safety measures with patient and family/Tailored Fall Risk Interventions/Visual Cue: Yellow wristband and red socks/Bed in lowest position, wheels locked, appropriate side rails in place/Call bell, personal items and telephone in reach/Instruct patient to call for assistance before getting out of bed or chair/Non-slip footwear when patient is out of bed/[place] to call system/Physically safe environment - no spills, clutter or unnecessary equipment/Purposeful Proactive Rounding/Room/bathroom lighting operational, light cord in reach

## 2024-03-24 NOTE — CONSULT NOTE ADULT - SUBJECTIVE AND OBJECTIVE BOX
Patient is a 53y Female whom presented to the hospital with SOB   HAS BEEN  ADMITTED  HERE MULTIPLE  TIMES  RECENTLY  WITH  SOB  OR  HIGH K,    HAS  ISSUES  WITH  COMPLIANT  WITH  HER  FLUID  AND K  RESTRICTIONS  AND   AS  SHE IS NOT  DIALYZED FOR  2  DAYS   OVER THE WEEKEND,  SHE  ENDS  UP  IN  HOSP  53 year old female from home ambulates independently, with PMH of HTN, DM2, ESRD on HD M/W/F (last session 3/22 )sarcoidosis on prednisone, anemia, seizure disorder (no longer on meds) who presented to the ED for shortness of breath. Patient states she started feeling short of breath this afternoon and was having difficulty speaking full sentences. She reports this happening to her often on Sundays with her last admission on 3/10/2024 for similar presentation. She denies making any urine but is still taking lasix.  SEEN DURING HD    TOLERATING IT  WELL,  BP  WAS  HIGH  INITIALLY , IMPROVING   SATURATING 100%  ON  NASAL  CANULA  PAST MEDICAL & SURGICAL HISTORY:  Sarcoidosis      Diabetes      Hypertension      Hyperlipidemia      Chronic kidney disease (CKD), stage III (moderate)      Seizure disorder      No significant past surgical history        No Known Allergies    Home Medications Reviewed  Hospital Medications:   MEDICATIONS  (STANDING):  atorvastatin 10 milliGRAM(s) Oral at bedtime  dextrose 5%. 1000 milliLiter(s) (50 mL/Hr) IV Continuous <Continuous>  dextrose 5%. 1000 milliLiter(s) (100 mL/Hr) IV Continuous <Continuous>  dextrose 50% Injectable 25 Gram(s) IV Push once  dextrose 50% Injectable 12.5 Gram(s) IV Push once  dextrose 50% Injectable 25 Gram(s) IV Push once  ferrous    sulfate 325 milliGRAM(s) Oral daily  gabapentin 300 milliGRAM(s) Oral two times a day  glucagon  Injectable 1 milliGRAM(s) IntraMuscular once  heparin   Injectable 5000 Unit(s) SubCutaneous every 8 hours  hydrALAZINE 50 milliGRAM(s) Oral three times a day  insulin lispro (ADMELOG) corrective regimen sliding scale   SubCutaneous every 6 hours  metoprolol succinate ER 25 milliGRAM(s) Oral daily  nitroglycerin  Infusion 5 MICROgram(s)/Min (1.5 mL/Hr) IV Continuous <Continuous>  predniSONE   Tablet 10 milliGRAM(s) Oral daily  sevelamer carbonate 1600 milliGRAM(s) Oral three times a day    SOCIAL HISTORY:  Denies ETOh,Smoking,   FAMILY HISTORY:  Family history of diabetes mellitus in first degree relative      REVIEW OF SYSTEMS:  FEELS   BETTER    HAS NO FEVER/CHILLS    NO  COUGH   SOB  IS  BETTER     APPETITE IS OK     NO  N/V    MAKES LITTLE URINE    HAS  LEG  SWELLING    VITALS:  T(F): 97.9 (03-24-24 @ 09:45), Max: 98.9 (03-24-24 @ 06:47)  HR: 104 (03-24-24 @ 09:45)  BP: 155/78 (03-24-24 @ 09:45)  RR: 18 (03-24-24 @ 09:45)  SpO2: 100% (03-24-24 @ 09:45)  Wt(kg): --    Height (cm): 152.4 (03-24 @ 04:53)  Weight (kg): 46.8 (03-24 @ 08:41)  BMI (kg/m2): 20.2 (03-24 @ 08:41)  BSA (m2): 1.41 (03-24 @ 08:41)  PHYSICAL EXAM:  Constitutional: NAD  HEENT: CONJ  PINK  Neck: No JVD  Respiratory: HAS  DECREASED  BREATH  SOUNDS AT BASES  POST   NO  RONCHI  Cardiovascular: S1, S2, RRR  Gastrointestinal: BS+, soft, NT/ND  Extremities:  1 +peripheral edema  Neurological: A/O x 3,   :  No rai.     Vascular Access: R IJ   PC    LABS:  03-24    137  |  98  |  32<H>  ----------------------------<  260<H>  4.2   |  28  |  4.73<H>    Ca    9.0      24 Mar 2024 05:15    TPro  8.2  /  Alb  3.7  /  TBili  0.4  /  DBili      /  AST  40  /  ALT  49  /  AlkPhos  118  03-24    Creatinine Trend: 4.73 <--                        12.6   12.09 )-----------( 364      ( 24 Mar 2024 05:15 )             38.2     Urine Studies:  Urinalysis Basic - ( 24 Mar 2024 05:15 )    Color:  / Appearance:  / SG:  / pH:   Gluc: 260 mg/dL / Ketone:   / Bili:  / Urobili:    Blood:  / Protein:  / Nitrite:    Leuk Esterase:  / RBC:  / WBC    Sq Epi:  / Non Sq Epi:  / Bacteria:         RADIOLOGY & ADDITIONAL STUDIES:

## 2024-03-24 NOTE — ED ADULT NURSE REASSESSMENT NOTE - NS ED NURSE REASSESS COMMENT FT1
pt received upon changing shift awake alert oriented x3. breathing via non rebreather 15L noted mild SOB on exertion. Nitroglycerin infusing as order. Pt awaiting for ICU bed assignment.

## 2024-03-24 NOTE — CONSULT NOTE ADULT - ASSESSMENT
A/P   ADMITTED  WITH   VOL  OVERLOAD  AND  SOB      SEEN DURING  HD   TOLERATING  IT  WELL  AND  FEELS  BETTER     UF  GOAL  3600CC    OFF  PRESTON-  HB GOOD    K  AND  BICARB  OK   BP  IMPROVING    ADV  TO RESTRICT  FLUID  OVER  THE WEEKENDS     WILL  CHANGE  HER  HD  SCHEDULE TO  TTS  LATE  AS  SHE  FREQ  GETS ADMITTED ON  SUNDAYS  WITH  ABOVE  PROBLEMS    CONT   HOME  MEDS

## 2024-03-24 NOTE — H&P ADULT - ATTENDING COMMENTS
Patient is a 54 y/o female with PMH of HTN, DM, ESRD on HD, sarcoidosis on prednisone, Seizure disorder. She gets HD MWF and had it Friday but states that she is often SOB Sunday after Friday HD. She now presents with SOB and O2 sat of 69%. The ED attending coordinated emergent HD for this morning. The patient is hypertensive with bilateral perihilar/basilar opacities on CXR consistent with pulmonary edema. She is refusing noninvasive ventilation but is able to converse and rr 30-32. Dx- acute hypoxemic respiratory failure due to ESRD. plan HD. COVID PCR is negative.   I reviewed all laboratory and roentgenographic data and any previous medical record from [facility] that existed. I also discussed the case with the ED attending or referring physician.

## 2024-03-25 LAB
ALBUMIN SERPL ELPH-MCNC: 2.7 G/DL — LOW (ref 3.5–5)
ALP SERPL-CCNC: 71 U/L — SIGNIFICANT CHANGE UP (ref 40–120)
ALT FLD-CCNC: 33 U/L DA — SIGNIFICANT CHANGE UP (ref 10–60)
ANION GAP SERPL CALC-SCNC: 9 MMOL/L — SIGNIFICANT CHANGE UP (ref 5–17)
AST SERPL-CCNC: 20 U/L — SIGNIFICANT CHANGE UP (ref 10–40)
BILIRUB SERPL-MCNC: 0.3 MG/DL — SIGNIFICANT CHANGE UP (ref 0.2–1.2)
BUN SERPL-MCNC: 20 MG/DL — HIGH (ref 7–18)
CALCIUM SERPL-MCNC: 8.3 MG/DL — LOW (ref 8.4–10.5)
CHLORIDE SERPL-SCNC: 100 MMOL/L — SIGNIFICANT CHANGE UP (ref 96–108)
CO2 SERPL-SCNC: 29 MMOL/L — SIGNIFICANT CHANGE UP (ref 22–31)
CREAT SERPL-MCNC: 3.53 MG/DL — HIGH (ref 0.5–1.3)
EGFR: 15 ML/MIN/1.73M2 — LOW
GAS PNL BLDA: SIGNIFICANT CHANGE UP
GLUCOSE SERPL-MCNC: 190 MG/DL — HIGH (ref 70–99)
HCT VFR BLD CALC: 30.7 % — LOW (ref 34.5–45)
HGB BLD-MCNC: 9.9 G/DL — LOW (ref 11.5–15.5)
MAGNESIUM SERPL-MCNC: 2.3 MG/DL — SIGNIFICANT CHANGE UP (ref 1.6–2.6)
MCHC RBC-ENTMCNC: 30.9 PG — SIGNIFICANT CHANGE UP (ref 27–34)
MCHC RBC-ENTMCNC: 32.2 GM/DL — SIGNIFICANT CHANGE UP (ref 32–36)
MCV RBC AUTO: 95.9 FL — SIGNIFICANT CHANGE UP (ref 80–100)
NRBC # BLD: 0 /100 WBCS — SIGNIFICANT CHANGE UP (ref 0–0)
PHOSPHATE SERPL-MCNC: 3.6 MG/DL — SIGNIFICANT CHANGE UP (ref 2.5–4.5)
PLATELET # BLD AUTO: 240 K/UL — SIGNIFICANT CHANGE UP (ref 150–400)
POTASSIUM SERPL-MCNC: 4.2 MMOL/L — SIGNIFICANT CHANGE UP (ref 3.5–5.3)
POTASSIUM SERPL-SCNC: 4.2 MMOL/L — SIGNIFICANT CHANGE UP (ref 3.5–5.3)
PROT SERPL-MCNC: 6.2 G/DL — SIGNIFICANT CHANGE UP (ref 6–8.3)
RBC # BLD: 3.2 M/UL — LOW (ref 3.8–5.2)
RBC # FLD: 14.6 % — HIGH (ref 10.3–14.5)
SODIUM SERPL-SCNC: 138 MMOL/L — SIGNIFICANT CHANGE UP (ref 135–145)
WBC # BLD: 8 K/UL — SIGNIFICANT CHANGE UP (ref 3.8–10.5)
WBC # FLD AUTO: 8 K/UL — SIGNIFICANT CHANGE UP (ref 3.8–10.5)

## 2024-03-25 PROCEDURE — 76775 US EXAM ABDO BACK WALL LIM: CPT | Mod: 26

## 2024-03-25 RX ORDER — LANOLIN ALCOHOL/MO/W.PET/CERES
3 CREAM (GRAM) TOPICAL AT BEDTIME
Refills: 0 | Status: DISCONTINUED | OUTPATIENT
Start: 2024-03-25 | End: 2024-03-25

## 2024-03-25 RX ORDER — TRAMADOL HYDROCHLORIDE 50 MG/1
25 TABLET ORAL ONCE
Refills: 0 | Status: DISCONTINUED | OUTPATIENT
Start: 2024-03-25 | End: 2024-03-25

## 2024-03-25 RX ORDER — LANOLIN ALCOHOL/MO/W.PET/CERES
5 CREAM (GRAM) TOPICAL AT BEDTIME
Refills: 0 | Status: DISCONTINUED | OUTPATIENT
Start: 2024-03-25 | End: 2024-03-27

## 2024-03-25 RX ORDER — ERYTHROPOIETIN 10000 [IU]/ML
4000 INJECTION, SOLUTION INTRAVENOUS; SUBCUTANEOUS
Refills: 0 | Status: DISCONTINUED | OUTPATIENT
Start: 2024-03-26 | End: 2024-03-27

## 2024-03-25 RX ADMIN — GABAPENTIN 300 MILLIGRAM(S): 400 CAPSULE ORAL at 17:28

## 2024-03-25 RX ADMIN — Medication 10 MILLIGRAM(S): at 06:35

## 2024-03-25 RX ADMIN — LIDOCAINE 1 PATCH: 4 CREAM TOPICAL at 23:48

## 2024-03-25 RX ADMIN — LIDOCAINE 1 PATCH: 4 CREAM TOPICAL at 18:26

## 2024-03-25 RX ADMIN — TRAMADOL HYDROCHLORIDE 25 MILLIGRAM(S): 50 TABLET ORAL at 22:45

## 2024-03-25 RX ADMIN — HEPARIN SODIUM 5000 UNIT(S): 5000 INJECTION INTRAVENOUS; SUBCUTANEOUS at 13:37

## 2024-03-25 RX ADMIN — SEVELAMER CARBONATE 1600 MILLIGRAM(S): 2400 POWDER, FOR SUSPENSION ORAL at 21:42

## 2024-03-25 RX ADMIN — ATORVASTATIN CALCIUM 10 MILLIGRAM(S): 80 TABLET, FILM COATED ORAL at 21:42

## 2024-03-25 RX ADMIN — Medication 50 MILLIGRAM(S): at 06:35

## 2024-03-25 RX ADMIN — TRAMADOL HYDROCHLORIDE 25 MILLIGRAM(S): 50 TABLET ORAL at 21:43

## 2024-03-25 RX ADMIN — Medication 50 MILLIGRAM(S): at 13:37

## 2024-03-25 RX ADMIN — Medication 2: at 21:41

## 2024-03-25 RX ADMIN — SEVELAMER CARBONATE 1600 MILLIGRAM(S): 2400 POWDER, FOR SUSPENSION ORAL at 06:34

## 2024-03-25 RX ADMIN — SEVELAMER CARBONATE 1600 MILLIGRAM(S): 2400 POWDER, FOR SUSPENSION ORAL at 13:37

## 2024-03-25 RX ADMIN — LIDOCAINE 1 PATCH: 4 CREAM TOPICAL at 11:57

## 2024-03-25 RX ADMIN — Medication 25 MILLIGRAM(S): at 06:34

## 2024-03-25 RX ADMIN — HEPARIN SODIUM 5000 UNIT(S): 5000 INJECTION INTRAVENOUS; SUBCUTANEOUS at 06:36

## 2024-03-25 RX ADMIN — Medication 5: at 11:59

## 2024-03-25 RX ADMIN — Medication 325 MILLIGRAM(S): at 11:57

## 2024-03-25 RX ADMIN — HEPARIN SODIUM 5000 UNIT(S): 5000 INJECTION INTRAVENOUS; SUBCUTANEOUS at 21:42

## 2024-03-25 RX ADMIN — GABAPENTIN 300 MILLIGRAM(S): 400 CAPSULE ORAL at 06:34

## 2024-03-25 RX ADMIN — Medication 2: at 16:34

## 2024-03-25 RX ADMIN — Medication 50 MILLIGRAM(S): at 21:42

## 2024-03-25 RX ADMIN — Medication 60 MILLIGRAM(S): at 06:35

## 2024-03-25 NOTE — PROGRESS NOTE ADULT - ASSESSMENT
ASSESSMENT  53 year old female from home ambulates independently, with PMH of HTN, DM2, ESRD on HD M/W/F (last session 3/22 )sarcoidosis on prednisone, anemia, seizure disorder (no longer on meds) who presented to the ED for shortness of breath admitted for AHRF secondary to pulmonary edema due to fluid overload in the setting of ESRD requiring urgent hemodialysis           _________CNS___________  #No active issues    #Seizure Disorder  -not on any meds   _________CVS___________  #Hypertensive Emergency  -started on Nitroglycerin drip in ED. MAP should be reduced gradually by approximately 25% in the next 24 hours. Target blood pressure of <180/<120 mmHg for the first hour and <160/<110 mmHg for the next 23 hours.  -hold home BP meds   -EKG NSR   _________RESP__________  #AHRF  -On Non rebreather saturating 100%  -Patient refusing non invasive ventilatory support   -Urgent HD Dr. Estrada consulted   -lactate 2.6. F/u repeat post HD     #Sarcoidosis  -c/w 10mg prednisone   ___________GI____________  #No active issues    ________ RENAL__________  #ESRD MWF  -last HD was on Friday  -Dr. Estrada consulted, planned for urgent HD via Right shiley catheter   -Pt states she no longer makes any urine      __________MSK___________  #No active issues     ___________ID____________  #No active issues     _________ENDO__________  #DM  -SSI     ______HEME/ONC_______  #Leukocytosis  likely reactive, afebrile denies any recent illness     _________SKIN____________  #No active issues     ________PROPHY_______  #DVT heparin sub q  #GI PPI      ______GOC/DISPO___________  ICU          ASSESSMENT  53 year old female from home ambulates independently, with PMH of HTN, DM2, ESRD on HD M/W/F (last session 3/22 )sarcoidosis on prednisone, anemia, seizure disorder (no longer on meds) who presented to the ED for shortness of breath admitted for AHRF secondary to pulmonary edema due to fluid overload in the setting of ESRD requiring urgent hemodialysis     _________CNS___________  #No active issues    #Seizure Disorder  -not on any meds     _________CVS___________  #Hypertensive Emergency  -started on Nitroglycerin drip in ED. MAP should be reduced gradually by approximately 25% in the next 24 hours. Target blood pressure of <180/<120 mmHg for the first hour and <160/<110 mmHg for the next 23 hours.  -hold home BP meds   -EKG NSR     _________RESP__________  #AHRF  -resolving; patient received HD 3/24; net negative 3.1L  -no on 2L NC  -Lungs clear    #Sarcoidosis  -c/w 10mg prednisone   ___________GI____________  #No active issues    ________ RENAL__________  #ESRD MWF  -last HD was on Friday  -Dr. Estrada consulted, planned for urgent HD via Right shiley catheter   -Pt states she no longer makes any urine      __________MSK___________  #No active issues     ___________ID____________  #No active issues     _________ENDO__________  #DM  -SSI     ______HEME/ONC_______  #Leukocytosis  likely reactive, afebrile denies any recent illness     _________SKIN____________  #No active issues     ________PROPHY_______  #DVT heparin sub q  #GI PPI      ______GOC/DISPO___________  ICU          ASSESSMENT  53 year old female from home ambulates independently, with PMH of HTN, DM2, ESRD on HD M/W/F (last session 3/22 )sarcoidosis on prednisone, anemia, seizure disorder (no longer on meds) who presented to the ED for shortness of breath admitted for AHRF secondary to pulmonary edema due to fluid overload in the setting of ESRD requiring urgent hemodialysis     _________CNS___________  #No active issues    #Seizure Disorder  -not on any meds     _________CVS___________  #Hypertensive Emergency  -started on Nitroglycerin drip in ED. MAP should be reduced gradually by approximately 25% in the next 24 hours. Target blood pressure of <180/<120 mmHg for the first hour and <160/<110 mmHg for the next 23 hours.  -hold home BP meds   -EKG NSR     _________RESP__________  #AHRF  -resolving; patient received HD 3/24; net negative 3.1L  -no on 2L NC  -Lungs clear    #Sarcoidosis of the Lungs  -c/w 10mg prednisone   will follow up with outpatient pulmonologist Dr. Leslie April 2024  ___________GI____________  #No active issues    ________ RENAL__________  #ESRD MWF change to TThSat makes no urine  -last HD was on [place] 3/24; 3.1L removed  -Dr. Estrada consulted, planned for urgent HD via Right shiley catheter   -Pt states she no longer makes any urine      __________MSK___________  #No active issues     ___________ID____________  #No active issues     _________ENDO__________  #DM  -SSI     ______HEME/ONC_______  #Leukocytosis  likely reactive, afebrile denies any recent illness     _________SKIN____________  #No active issues     ________PROPHY_______  #DVT heparin sub q  #GI PPI      ______GOC/DISPO___________  Medicine         ASSESSMENT  53 year old female from home ambulates independently, with PMH of HTN, DM2, ESRD on HD M/W/F (last session 3/22 )sarcoidosis on prednisone, anemia, seizure disorder (no longer on meds) who presented to the ED for shortness of breath admitted for AHRF secondary to pulmonary edema due to fluid overload in the setting of ESRD requiring urgent hemodialysis     _________CNS___________  #No active issues    #Seizure Disorder  -not on any meds     _________CVS___________  #Hypertensive Emergency  -started on Nitroglycerin drip in ED. MAP should be reduced gradually by approximately 25% in the next 24 hours. Target blood pressure of <180/<120 mmHg for the first hour and <160/<110 mmHg for the next 23 hours.  -hold home BP meds   -EKG NSR     _________RESP__________  #AHRF  -resolving; patient received HD 3/24; net negative 3.1L  -no on 2L NC  -Lungs clear    #Sarcoidosis of the Lung  -c/w 10mg prednisone   will follow up with outpatient pulmonologist Dr. Leslie April 2024  ___________GI____________  #No active issues    ________ RENAL__________  #ESRD MWF change to TThSat makes no urine  -last HD was on [place] 3/24; 3.1L removed  -Dr. Estrada consulted, planned for urgent HD via Right shiley catheter   -Pt states she no longer makes any urine  Renal Ultrasound:No evidence of hydronephrosis. Renal parenchymal disease.      __________MSK___________  #No active issues     ___________ID____________  #No active issues     _________ENDO__________  #DM  -SSI     ______HEME/ONC_______  #Leukocytosis  likely reactive, afebrile denies any recent illness     _________SKIN____________  #No active issues     ________PROPHY_______  #DVT heparin sub q  #GI PPI      ______GOC/DISPO___________  DG to Medicine

## 2024-03-25 NOTE — PROGRESS NOTE ADULT - SUBJECTIVE AND OBJECTIVE BOX
[facility] : Progress Note :: [phone], (office [phone]),   Dr Estrada / Dr Das / Dr Gale / Dr Vega / Dr Debbie FORD / Dr Garcia / Dr Chandra / Dr Jona pal  _____________________________________________________________________________________________  s/p emergent HD yesterday for flash pulmonary edema.    No Known Allergies    Hospital Medications:   MEDICATIONS  (STANDING):  atorvastatin 10 milliGRAM(s) Oral at bedtime  ferrous    sulfate 325 milliGRAM(s) Oral daily  gabapentin 300 milliGRAM(s) Oral two times a day  heparin   Injectable 5000 Unit(s) SubCutaneous every 8 hours  hydrALAZINE 50 milliGRAM(s) Oral three times a day  insulin lispro (ADMELOG) corrective regimen sliding scale   SubCutaneous Before meals and at bedtime  lidocaine   4% Patch 1 Patch Transdermal daily  metoprolol succinate ER 25 milliGRAM(s) Oral daily  NIFEdipine XL 60 milliGRAM(s) Oral daily  predniSONE   Tablet 10 milliGRAM(s) Oral daily  sevelamer carbonate 1600 milliGRAM(s) Oral three times a day        VITALS:  T(F): 99 (03-25-24 @ 13:00), Max: 100.4 (03-24-24 @ 17:00)  HR: 93 (03-25-24 @ 14:00)  BP: 142/78 (03-25-24 @ 14:00)  RR: 17 (03-25-24 @ 14:00)  SpO2: 98% (03-25-24 @ 14:00)  Wt(kg): --    03-24 @ 07:01  -  03-25 @ 07:00  --------------------------------------------------------  IN: 500 mL / OUT: 3600 mL / NET: -3100 mL    03-25 @ 07:01  -  03-25 @ 15:37  --------------------------------------------------------  IN: 200 mL / OUT: 0 mL / NET: 200 mL        PHYSICAL EXAM:  Constitutional: NAD  HEENT: anicteric sclera, oropharynx clear.  Neck: No JVD  Respiratory: CTAB, no wheezes, rales or rhonchi  Cardiovascular: S1, S2, RRR  Gastrointestinal: BS+, soft, NT/ND  Extremities: No peripheral edema  Neurological: A/O x 3, no focal deficits.  Vascular Access: IJ permacath     LABS:  03-25    138  |  100  |  20<H>  ----------------------------<  190<H>  4.2   |  29  |  3.53<H>    Ca    8.3<L>      25 Mar 2024 04:12  Phos  3.6     03-25  Mg     2.3     03-25    TPro  6.2  /  Alb  2.7<L>  /  TBili  0.3  /  DBili      /  AST  20  /  ALT  33  /  AlkPhos  71  03-25    Creatinine Trend: 3.53 <--, 2.62 <--, 4.73 <--                        9.9    8.00  )-----------( 240      ( 25 Mar 2024 04:12 )             30.7     Urine Studies:  Urinalysis Basic - ( 25 Mar 2024 04:12 )    Color:  / Appearance:  / SG:  / pH:   Gluc: 190 mg/dL / Ketone:   / Bili:  / Urobili:    Blood:  / Protein:  / Nitrite:    Leuk Esterase:  / RBC:  / WBC    Sq Epi:  / Non Sq Epi:  / Bacteria:         RADIOLOGY & ADDITIONAL STUDIES:

## 2024-03-25 NOTE — PROGRESS NOTE ADULT - SUBJECTIVE AND OBJECTIVE BOX
INTERVAL HPI/OVERNIGHT EVENTS:     PRESSORS: [ ] YES [ ] NO  WHICH:    ANTIBIOTICS:                  DATE STARTED:  ANTIBIOTICS:                  DATE STARTED:  ANTIBIOTICS:                  DATE STARTED:    Antimicrobial:    Cardiovascular:  hydrALAZINE 50 milliGRAM(s) Oral three times a day  metoprolol succinate ER 25 milliGRAM(s) Oral daily  NIFEdipine XL 60 milliGRAM(s) Oral daily    Pulmonary:    Hematalogic:  heparin   Injectable 5000 Unit(s) SubCutaneous every 8 hours    Other:  acetaminophen     Tablet .. 650 milliGRAM(s) Oral every 6 hours PRN  atorvastatin 10 milliGRAM(s) Oral at bedtime  dextrose 5%. 1000 milliLiter(s) IV Continuous <Continuous>  dextrose 5%. 1000 milliLiter(s) IV Continuous <Continuous>  dextrose 50% Injectable 25 Gram(s) IV Push once  dextrose 50% Injectable 12.5 Gram(s) IV Push once  dextrose 50% Injectable 25 Gram(s) IV Push once  dextrose Oral Gel 15 Gram(s) Oral once PRN  ferrous    sulfate 325 milliGRAM(s) Oral daily  gabapentin 300 milliGRAM(s) Oral two times a day  glucagon  Injectable 1 milliGRAM(s) IntraMuscular once  insulin lispro (ADMELOG) corrective regimen sliding scale   SubCutaneous Before meals and at bedtime  lidocaine   4% Patch 1 Patch Transdermal daily  predniSONE   Tablet 10 milliGRAM(s) Oral daily  sevelamer carbonate 1600 milliGRAM(s) Oral three times a day      Drug Dosing Weight  Height (cm): 152.4 (24 Mar 2024 04:53)  Weight (kg): 46.8 (24 Mar 2024 08:41)  BMI (kg/m2): 20.2 (24 Mar 2024 08:41)  BSA (m2): 1.41 (24 Mar 2024 08:41)    CENTRAL LINE: [ ] YES [ ] NO  LOCATION:   DATE INSERTED:  REMOVE: [ ] YES [ ] NO  EXPLAIN:    MCINTOSH: [ ] YES [ ] NO    DATE INSERTED:  REMOVE:  [ ] YES [ ] NO  EXPLAIN:    A-LINE:  [ ] YES [ ] NO  LOCATION:   DATE INSERTED:  REMOVE:  [ ] YES [ ] NO  EXPLAIN:    PMH/Social Hx/Fam Hx -reviewed admission note, no change since admission  PAST MEDICAL & SURGICAL HISTORY:  Sarcoidosis      Diabetes      Hypertension      Hyperlipidemia      Chronic kidney disease (CKD), stage III (moderate)      Seizure disorder      No significant past surgical history        Heart faliure: acute [ ] chronic [ ] acute or chronic [ ] diastolic [ ] systolic [ ] combied systolic and diastolic[ ]  MIKALA: ATN[ ] renal medullary necrosis [ ] CKD I [ ]CKDII [ ]CKD III [ ]CKD IV [ ]CKD V [ ]Other pathological lesions [ ]  Abdominal Nutrition Status: malnutrition [ ] cachexia [ ] morbid obesity/BMI=40 [ ] Supplement ordered [___________]     T(C): 37.1 (03-25-24 @ 05:00), Max: 38.2 (03-24-24 @ 15:30)  HR: 83 (03-25-24 @ 06:00)  BP: 143/74 (03-25-24 @ 06:00)  BP(mean): 92 (03-25-24 @ 06:00)  ABP: --  ABP(mean): --  RR: 16 (03-25-24 @ 06:00)  SpO2: 99% (03-25-24 @ 06:00)  Wt(kg): --          03-24 @ 07:01  -  03-25 @ 07:00  --------------------------------------------------------  IN: 500 mL / OUT: 3600 mL / NET: -3100 mL            PHYSICAL EXAM:    GENERAL: [ ]NAD, [ ]well-groomed, [ ]well-developed  HEAD:  [ ]Atraumatic, [ ]Normocephalic  EYES: [ ]EOMI, [ ]PERRLA, [ ]conjunctiva and sclera clear  ENMT: [ ]No tonsillar erythema, exudates, or enlargement; [ ]Moist mucous membranes, [ ]Good dentition, [ ]No lesions  NECK: [ ]Supple, normal appearance, [ ]No JVD; [ ]Normal thyroid; [ ]Trachea midline  NERVOUS SYSTEM:  [ ]Alert & Oriented X3, [ ]Good concentration; [ ]Motor Strength 5/5 B/L upper and lower extremities; [ ]DTRs 2+ intact and symmetric  CHEST/LUNG: [ ]No chest deformity; [ ]Normal percussion bilaterally; [ ]No rales, rhonchi, wheezing; [ ]Crackles at bases  HEART: [ ]Regular rate and rhythm; [ ]No murmurs, rubs, or gallops  ABDOMEN: [ ]Soft, Nontender, Nondistended; [ ]Bowel sounds present  EXTREMITIES:  [ ]2+ Peripheral Pulses, [ ]No clubbing, cyanosis, or edema [ ]Bilat lower extremity edema  LYMPH: [ ]No lymphadenopathy noted  SKIN: [ ]No rashes or lesions; [ ]Good capillary refill      LABS:  CBC Full  -  ( 25 Mar 2024 04:12 )  WBC Count : 8.00 K/uL  RBC Count : 3.20 M/uL  Hemoglobin : 9.9 g/dL  Hematocrit : 30.7 %  Platelet Count - Automated : 240 K/uL  Mean Cell Volume : 95.9 fl  Mean Cell Hemoglobin : 30.9 pg  Mean Cell Hemoglobin Concentration : 32.2 gm/dL  Auto Neutrophil # : x  Auto Lymphocyte # : x  Auto Monocyte # : x  Auto Eosinophil # : x  Auto Basophil # : x  Auto Neutrophil % : x  Auto Lymphocyte % : x  Auto Monocyte % : x  Auto Eosinophil % : x  Auto Basophil % : x    03-25    138  |  100  |  20<H>  ----------------------------<  190<H>  4.2   |  29  |  3.53<H>    Ca    8.3<L>      25 Mar 2024 04:12  Phos  3.6     03-25  Mg     2.3     03-25    TPro  6.2  /  Alb  2.7<L>  /  TBili  0.3  /  DBili  x   /  AST  20  /  ALT  33  /  AlkPhos  71  03-25      Urinalysis Basic - ( 25 Mar 2024 04:12 )    Color: x / Appearance: x / SG: x / pH: x  Gluc: 190 mg/dL / Ketone: x  / Bili: x / Urobili: x   Blood: x / Protein: x / Nitrite: x   Leuk Esterase: x / RBC: x / WBC x   Sq Epi: x / Non Sq Epi: x / Bacteria: x          RADIOLOGY & ADDITIONAL STUDIES REVIEWED:      [ ]GOALS OF CARE DISCUSSION WITH PATIENT/FAMILY/PROXY:    CRITICAL CARE TIME SPENT: 35 minutes INTERVAL HPI/OVERNIGHT EVENTS: No acute events overnight; back pain resolved with oxy      Cardiovascular:  hydrALAZINE 50 milliGRAM(s) Oral three times a day  metoprolol succinate ER 25 milliGRAM(s) Oral daily  NIFEdipine XL 60 milliGRAM(s) Oral daily    Pulmonary:    Hematalogic:  heparin   Injectable 5000 Unit(s) SubCutaneous every 8 hours    Other:  acetaminophen     Tablet .. 650 milliGRAM(s) Oral every 6 hours PRN  atorvastatin 10 milliGRAM(s) Oral at bedtime  dextrose 5%. 1000 milliLiter(s) IV Continuous <Continuous>  dextrose 5%. 1000 milliLiter(s) IV Continuous <Continuous>  dextrose 50% Injectable 25 Gram(s) IV Push once  dextrose 50% Injectable 12.5 Gram(s) IV Push once  dextrose 50% Injectable 25 Gram(s) IV Push once  dextrose Oral Gel 15 Gram(s) Oral once PRN  ferrous    sulfate 325 milliGRAM(s) Oral daily  gabapentin 300 milliGRAM(s) Oral two times a day  glucagon  Injectable 1 milliGRAM(s) IntraMuscular once  insulin lispro (ADMELOG) corrective regimen sliding scale   SubCutaneous Before meals and at bedtime  lidocaine   4% Patch 1 Patch Transdermal daily  predniSONE   Tablet 10 milliGRAM(s) Oral daily  sevelamer carbonate 1600 milliGRAM(s) Oral three times a day      Drug Dosing Weight  Height (cm): 152.4 (24 Mar 2024 04:53)  Weight (kg): 46.8 (24 Mar 2024 08:41)  BMI (kg/m2): 20.2 (24 Mar 2024 08:41)  BSA (m2): 1.41 (24 Mar 2024 08:41)      PMH/Social Hx/Fam Hx -reviewed admission note, no change since admission  PAST MEDICAL & SURGICAL HISTORY:  Sarcoidosis      Diabetes      Hypertension      Hyperlipidemia      Chronic kidney disease (CKD), stage III (moderate)      Seizure disorder      No significant past surgical history          T(C): 37.1 (03-25-24 @ 05:00), Max: 38.2 (03-24-24 @ 15:30)  HR: 83 (03-25-24 @ 06:00)  BP: 143/74 (03-25-24 @ 06:00)  BP(mean): 92 (03-25-24 @ 06:00)  ABP: --  ABP(mean): --  RR: 16 (03-25-24 @ 06:00)  SpO2: 99% (03-25-24 @ 06:00)  Wt(kg): --          03-24 @ 07:01  -  03-25 @ 07:00  --------------------------------------------------------  IN: 500 mL / OUT: 3600 mL / NET: -3100 mL            PHYSICAL EXAM:  GENERAL: middle aged; no acute distress  HEAD:  Atraumatic, Normocephalic  EYES: EOMI, PERRLA, conjunctiva and sclera clear  NERVOUS SYSTEM:  Alert & Oriented X3,  Moves all extremities spontaneously  CHEST/LUNG: Lungs clear to auscultation bilaterally, No rales, rhonchi, wheezing   HEART: Regular rate and rhythm; No murmurs, rubs, or gallops  ABDOMEN: Soft, Nontender, Nondistended; Bowel sounds present  EXTREMITIES:  2+ Peripheral Pulses, No clubbing, cyanosis, or edema  SKIN: No rashes or lesions;  Good capillary refill     LABS:  CBC Full  -  ( 25 Mar 2024 04:12 )  WBC Count : 8.00 K/uL  RBC Count : 3.20 M/uL  Hemoglobin : 9.9 g/dL  Hematocrit : 30.7 %  Platelet Count - Automated : 240 K/uL  Mean Cell Volume : 95.9 fl  Mean Cell Hemoglobin : 30.9 pg  Mean Cell Hemoglobin Concentration : 32.2 gm/dL  Auto Neutrophil # : x  Auto Lymphocyte # : x  Auto Monocyte # : x  Auto Eosinophil # : x  Auto Basophil # : x  Auto Neutrophil % : x  Auto Lymphocyte % : x  Auto Monocyte % : x  Auto Eosinophil % : x  Auto Basophil % : x    03-25    138  |  100  |  20<H>  ----------------------------<  190<H>  4.2   |  29  |  3.53<H>    Ca    8.3<L>      25 Mar 2024 04:12  Phos  3.6     03-25  Mg     2.3     03-25    TPro  6.2  /  Alb  2.7<L>  /  TBili  0.3  /  DBili  x   /  AST  20  /  ALT  33  /  AlkPhos  71  03-25      Urinalysis Basic - ( 25 Mar 2024 04:12 )    Color: x / Appearance: x / SG: x / pH: x  Gluc: 190 mg/dL / Ketone: x  / Bili: x / Urobili: x   Blood: x / Protein: x / Nitrite: x   Leuk Esterase: x / RBC: x / WBC x   Sq Epi: x / Non Sq Epi: x / Bacteria: x          RADIOLOGY & ADDITIONAL STUDIES REVIEWED:      [ ]GOALS OF CARE DISCUSSION WITH PATIENT/FAMILY/PROXY:    CRITICAL CARE TIME SPENT: 35 minutes

## 2024-03-25 NOTE — PROGRESS NOTE ADULT - ASSESSMENT
# ESRD. admitted with pulmonary edema. S/P emergent HD yesterday. SOB resolved.  HD in AM.  as gets frequent admissions over the weekend, her new outpatient dialysis schedule is [facility] 4th shift. Will consider HD on Mondays as well.   no objection to D/C home after HD in AM.  # Hypertensive emergency, BP ok after emergent HD and resumption of BP meds  # anemia of CKD. epogen with HD as BP better  # renal osteodystrophy. cont sevelamer

## 2024-03-26 LAB
ANION GAP SERPL CALC-SCNC: 9 MMOL/L — SIGNIFICANT CHANGE UP (ref 5–17)
APPEARANCE UR: CLEAR — SIGNIFICANT CHANGE UP
BACTERIA # UR AUTO: NEGATIVE /HPF — SIGNIFICANT CHANGE UP
BILIRUB UR-MCNC: NEGATIVE — SIGNIFICANT CHANGE UP
BUN SERPL-MCNC: 38 MG/DL — HIGH (ref 7–18)
CALCIUM SERPL-MCNC: 8.9 MG/DL — SIGNIFICANT CHANGE UP (ref 8.4–10.5)
CHLORIDE SERPL-SCNC: 100 MMOL/L — SIGNIFICANT CHANGE UP (ref 96–108)
CO2 SERPL-SCNC: 29 MMOL/L — SIGNIFICANT CHANGE UP (ref 22–31)
COLOR SPEC: YELLOW — SIGNIFICANT CHANGE UP
COMMENT - URINE: SIGNIFICANT CHANGE UP
CREAT SERPL-MCNC: 5.56 MG/DL — HIGH (ref 0.5–1.3)
DIFF PNL FLD: NEGATIVE — SIGNIFICANT CHANGE UP
EGFR: 9 ML/MIN/1.73M2 — LOW
EPI CELLS # UR: PRESENT
GLUCOSE SERPL-MCNC: 194 MG/DL — HIGH (ref 70–99)
GLUCOSE UR QL: 250 MG/DL
HCT VFR BLD CALC: 30.4 % — LOW (ref 34.5–45)
HGB BLD-MCNC: 9.6 G/DL — LOW (ref 11.5–15.5)
KETONES UR-MCNC: NEGATIVE MG/DL — SIGNIFICANT CHANGE UP
LEUKOCYTE ESTERASE UR-ACNC: NEGATIVE — SIGNIFICANT CHANGE UP
MAGNESIUM SERPL-MCNC: 2.5 MG/DL — SIGNIFICANT CHANGE UP (ref 1.6–2.6)
MCHC RBC-ENTMCNC: 30.3 PG — SIGNIFICANT CHANGE UP (ref 27–34)
MCHC RBC-ENTMCNC: 31.6 GM/DL — LOW (ref 32–36)
MCV RBC AUTO: 95.9 FL — SIGNIFICANT CHANGE UP (ref 80–100)
NITRITE UR-MCNC: NEGATIVE — SIGNIFICANT CHANGE UP
NRBC # BLD: 0 /100 WBCS — SIGNIFICANT CHANGE UP (ref 0–0)
PH UR: 7.5 — SIGNIFICANT CHANGE UP (ref 5–8)
PHOSPHATE SERPL-MCNC: 4.4 MG/DL — SIGNIFICANT CHANGE UP (ref 2.5–4.5)
PLATELET # BLD AUTO: 244 K/UL — SIGNIFICANT CHANGE UP (ref 150–400)
POTASSIUM SERPL-MCNC: 4.8 MMOL/L — SIGNIFICANT CHANGE UP (ref 3.5–5.3)
POTASSIUM SERPL-SCNC: 4.8 MMOL/L — SIGNIFICANT CHANGE UP (ref 3.5–5.3)
PROT UR-MCNC: >=1000 MG/DL
RBC # BLD: 3.17 M/UL — LOW (ref 3.8–5.2)
RBC # FLD: 14.9 % — HIGH (ref 10.3–14.5)
RBC CASTS # UR COMP ASSIST: 1 /HPF — SIGNIFICANT CHANGE UP (ref 0–4)
SODIUM SERPL-SCNC: 138 MMOL/L — SIGNIFICANT CHANGE UP (ref 135–145)
SP GR SPEC: 1.01 — SIGNIFICANT CHANGE UP (ref 1–1.03)
UROBILINOGEN FLD QL: 0.2 MG/DL — SIGNIFICANT CHANGE UP (ref 0.2–1)
WBC # BLD: 7.69 K/UL — SIGNIFICANT CHANGE UP (ref 3.8–10.5)
WBC # FLD AUTO: 7.69 K/UL — SIGNIFICANT CHANGE UP (ref 3.8–10.5)
WBC UR QL: 3 /HPF — SIGNIFICANT CHANGE UP (ref 0–5)

## 2024-03-26 RX ORDER — NIFEDIPINE 30 MG
90 TABLET, EXTENDED RELEASE 24 HR ORAL DAILY
Refills: 0 | Status: DISCONTINUED | OUTPATIENT
Start: 2024-03-26 | End: 2024-03-27

## 2024-03-26 RX ORDER — CHLORHEXIDINE GLUCONATE 213 G/1000ML
1 SOLUTION TOPICAL DAILY
Refills: 0 | Status: DISCONTINUED | OUTPATIENT
Start: 2024-03-26 | End: 2024-03-27

## 2024-03-26 RX ORDER — IPRATROPIUM/ALBUTEROL SULFATE 18-103MCG
3 AEROSOL WITH ADAPTER (GRAM) INHALATION ONCE
Refills: 0 | Status: COMPLETED | OUTPATIENT
Start: 2024-03-26 | End: 2024-03-26

## 2024-03-26 RX ORDER — ALBUTEROL 90 UG/1
2 AEROSOL, METERED ORAL EVERY 6 HOURS
Refills: 0 | Status: DISCONTINUED | OUTPATIENT
Start: 2024-03-26 | End: 2024-03-27

## 2024-03-26 RX ADMIN — Medication 50 MILLIGRAM(S): at 06:37

## 2024-03-26 RX ADMIN — LIDOCAINE 1 PATCH: 4 CREAM TOPICAL at 13:12

## 2024-03-26 RX ADMIN — Medication 5 MILLIGRAM(S): at 00:24

## 2024-03-26 RX ADMIN — Medication 325 MILLIGRAM(S): at 13:12

## 2024-03-26 RX ADMIN — HEPARIN SODIUM 5000 UNIT(S): 5000 INJECTION INTRAVENOUS; SUBCUTANEOUS at 06:37

## 2024-03-26 RX ADMIN — SEVELAMER CARBONATE 1600 MILLIGRAM(S): 2400 POWDER, FOR SUSPENSION ORAL at 21:47

## 2024-03-26 RX ADMIN — Medication 60 MILLIGRAM(S): at 06:37

## 2024-03-26 RX ADMIN — ATORVASTATIN CALCIUM 10 MILLIGRAM(S): 80 TABLET, FILM COATED ORAL at 21:46

## 2024-03-26 RX ADMIN — GABAPENTIN 300 MILLIGRAM(S): 400 CAPSULE ORAL at 20:45

## 2024-03-26 RX ADMIN — SEVELAMER CARBONATE 1600 MILLIGRAM(S): 2400 POWDER, FOR SUSPENSION ORAL at 13:12

## 2024-03-26 RX ADMIN — GABAPENTIN 300 MILLIGRAM(S): 400 CAPSULE ORAL at 06:36

## 2024-03-26 RX ADMIN — Medication 25 MILLIGRAM(S): at 06:36

## 2024-03-26 RX ADMIN — Medication 50 MILLIGRAM(S): at 21:46

## 2024-03-26 RX ADMIN — Medication 3 MILLILITER(S): at 11:33

## 2024-03-26 RX ADMIN — Medication 1: at 21:48

## 2024-03-26 RX ADMIN — Medication 10 MILLIGRAM(S): at 06:37

## 2024-03-26 RX ADMIN — HEPARIN SODIUM 5000 UNIT(S): 5000 INJECTION INTRAVENOUS; SUBCUTANEOUS at 21:46

## 2024-03-26 RX ADMIN — Medication 1: at 07:37

## 2024-03-26 RX ADMIN — Medication 50 MILLIGRAM(S): at 13:11

## 2024-03-26 RX ADMIN — HEPARIN SODIUM 5000 UNIT(S): 5000 INJECTION INTRAVENOUS; SUBCUTANEOUS at 13:13

## 2024-03-26 RX ADMIN — Medication 3: at 12:15

## 2024-03-26 RX ADMIN — ERYTHROPOIETIN 4000 UNIT(S): 10000 INJECTION, SOLUTION INTRAVENOUS; SUBCUTANEOUS at 18:03

## 2024-03-26 RX ADMIN — SEVELAMER CARBONATE 1600 MILLIGRAM(S): 2400 POWDER, FOR SUSPENSION ORAL at 06:36

## 2024-03-26 RX ADMIN — Medication 30 MILLIGRAM(S): at 07:39

## 2024-03-26 RX ADMIN — LIDOCAINE 1 PATCH: 4 CREAM TOPICAL at 19:49

## 2024-03-26 NOTE — PROGRESS NOTE ADULT - ATTENDING COMMENTS
ASSESSMENT  This is a 53 year old woman  from home ambulates independently, with  HTN, uncontrol DM2, ESRD on HD M/W/F (last session 3/22 )sarcoidosis on prednisone, anemia, seizure disorder (no longer on meds) who presented to the ED for shortness of breath admitted for AHRF secondary to pulmonary edema due to fluid overload in the setting of ESRD requiring urgent hemodialysis     _________CNS___________  #No active issues    #Seizure Disorder  -not on any meds     _________CVS___________  #Hypertensive Emergency  -started on Nitroglycerin drip in ED. MAP should be reduced gradually by approximately 25% in the next 24 hours. Target blood pressure of <180/<120 mmHg for the first hour and <160/<110 mmHg for the next 23 hours.  -hold home BP meds   -EKG NSR     _________RESP__________  #AHRF  -resolving; patient received HD 3/24; net negative 3.1L  -no on 2L NC  -Lungs clear    #Sarcoidosis of the Lung  -c/w 10mg prednisone   will follow up with outpatient pulmonologist Dr. Leslie April 2024  ___________GI____________  #No active issues    ________ RENAL__________  #ESRD MWF change to TThSat makes no urine  -last HD was on [facility] 3/24; 3.1L removed  -Dr. Estrada consulted, planned for urgent HD via Right shiley catheter   -Pt states she no longer makes any urine  Renal Ultrasound:No evidence of hydronephrosis. Renal parenchymal disease.    ______HEME/ONC_______  #Leukocytosis  likely reactive, afebrile denies any recent illness     _________SKIN____________  #No active issues
53 year old female from home ambulates independently, with PMH of HTN, DM2, ESRD on HD M/W/F (last session 3/22 )sarcoidosis on prednisone, anemia, seizure disorder (no longer on meds) who presented to the ED for shortness of breath admitted for AHRF secondary to pulmonary edema due to fluid overload in the setting of ESRD requiring urgent hemodialysis     _________CNS___________  #No active issues  #Seizure Disorder  -not on any meds     _________CVS___________  #Hypertension  Continue home nifedipine,; add on home medications as blood pressure stabilizes    _________RESP__________  #AHRF  -resolving; patient received HD 3/24; net negative 3.1L  -now on 2L NC; patient has had recurrent issues with oxygenation given fluid overload  -lungs now with crackles  - wean O2 if possible; however if unable to;may DC with Home O2    #Sarcoidosis of the Lung  -c/w 10mg prednisone   will follow up with outpatient pulmonologist Dr. Leslie April 2024    #Bronchiectasis  Given long history of sarcoid and bronchiectasis; patient will benefit from Airway clearance therapy;  [ ] Acapella    ___________GI____________  #No active issues    ________ RENAL__________  #ESRD MWF change to TThSat makes no urine  -last HD was on [facility] 3/24; 3.1L removed  -Dr. Estrada consulted, planned for urgent HD via Right shiley catheter   -Pt states she no longer makes any urine  Renal Ultrasound:No evidence of hydronephrosis. Renal parenchymal disease.

## 2024-03-26 NOTE — CHART NOTE - NSCHARTNOTEFT_GEN_A_CORE
53 year old female from home ambulates independently, with PMH of HTN, DM2, ESRD on HD M/W/F (last session 3/22 )sarcoidosis on prednisone, anemia, seizure disorder (no longer on meds) who presented to the ED for shortness of breath admitted for AHRF secondary to pulmonary edema due to fluid overload in the setting of ESRD requiring urgent hemodialysis.   She had AHRF. She refused bipap and was put on NRB. After emergent dialysis on 3/24, she was placed on 2L NC. She was positive for Influenza A. Tamiflu was started. It is to be given at 30mg after each dialysis for at least 5 days.  She was started on Nitroglycerin drip in ED. She was later transitioned to home meds. Lactate was 2.6 -> 3.4 -> 2.6  Home sarcoidosis of lung med Prednisone 10mg was continued. To follow up with outpatient pulmonologist Dr. Leslie in [place] April 2024 Patient is stable for downgrade to medical floors.    [Things to follow]  - Order Tamiflu 30mg after each dialysis for 5 days at least (starting 3/24~) with dialysis  - Nephro recs: Appreciate Dr. Estrada; Tu/Thur/Sat possible Monday as well given recurrent admissions for fluid overload 53 year old female from home ambulates independently, with PMH of HTN, DM2, ESRD on HD M/W/F (last session 3/22 )sarcoidosis on prednisone, anemia, seizure disorder (no longer on meds) who presented to the ED for shortness of breath admitted for AHRF secondary to pulmonary edema due to fluid overload in the setting of ESRD requiring urgent hemodialysis.   She had AHRF. She refused bipap and was put on NRB. After emergent dialysis on 3/24, she was placed on 2L NC. She was positive for Influenza A. Tamiflu was started. It is to be given at 30mg after each dialysis for at least 5 days.  She was started on Nitroglycerin drip in ED. She was later transitioned to home meds. Lactate was 2.6 -> 3.4 -> 2.6  Home sarcoidosis of lung med Prednisone 10mg was continued. To follow up with outpatient pulmonologist Dr. Leslie in [place] April 2024 Patient is stable for downgrade to medical floors.    [Things to follow]  - Order Tamiflu 30mg after each dialysis for 5 days at least (starting 3/24~) with dialysis, Received 2 doses on 3/24 and 3/26 will need to receive 1-3 doses with dialysis  - Nephro recs: Appreciate Dr. Estrada; Tu/Thur/Sat possible Monday as well given recurrent admissions for fluid overload  - Home O2; patient continues to be hypoxic; very dependent on O2; send home with O2 53 year old female from home ambulates independently, with PMH of HTN, DM2, ESRD on HD M/W/F (last session 3/22 )sarcoidosis on prednisone, anemia, seizure disorder (no longer on meds) who presented to the ED for shortness of breath admitted for AHRF secondary to pulmonary edema due to fluid overload in the setting of ESRD requiring urgent hemodialysis.   She had AHRF. She refused bipap and was put on NRB. After emergent dialysis on 3/24, she was placed on 2L NC. She was positive for Influenza A. Tamiflu was started. It is to be given at 30mg after each dialysis for at least 5 days.  She was started on Nitroglycerin drip in ED. She was later transitioned to home meds. Lactate was 2.6 -> 3.4 -> 2.6  Home sarcoidosis of lung med Prednisone 10mg was continued. To follow up with outpatient pulmonologist Dr. Leslie in [place] April 2024 Patient is stable for downgrade to medical floors.    Patient signed out to NP Ro Del Angel, and attending Dr. Rodriguez.     [Things to follow]  - Order Tamiflu 30mg after each dialysis for 5 days at least (starting 3/24~) with dialysis, Received 2 doses on 3/24 and 3/26 will need to receive 1-3 doses with dialysis  - Nephro recs: Appreciate Dr. Estrada; Tu/Thur/Sat possible Monday as well given recurrent admissions for fluid overload  - Home O2; patient continues to be hypoxic; very dependent on O2; send home with O2

## 2024-03-26 NOTE — PROGRESS NOTE ADULT - REASON FOR ADMISSION
AHRF requiring Urgent Hemodialysis

## 2024-03-26 NOTE — PROGRESS NOTE ADULT - ASSESSMENT
# ESRD. admitted with pulmonary edema. S/P emergent HD on Sunday. HD today. SOB improved.  as gets frequent admissions over the weekend, her new outpatient dialysis schedule is TTS 4th shift. Will consider HD on Mondays as well.   # Hypertensive emergency, BP ok after emergent HD and resumption of BP meds  # anemia of CKD. epogen with HD as BP better  # renal osteodystrophy. cont sevelamer

## 2024-03-26 NOTE — PROGRESS NOTE ADULT - SUBJECTIVE AND OBJECTIVE BOX
[facility] : Progress Note :: [phone], (office [phone]),   Dr Estrada / Dr Das / Dr Gale / Dr Vega / Dr Debbie FORD / Dr Garcia / Dr Chandra / Dr Jona pal  _____________________________________________________________________________________________    seen on HD. SOB improved with UF     No Known Allergies    Hospital Medications:   MEDICATIONS  (STANDING):  atorvastatin 10 milliGRAM(s) Oral at bedtime  chlorhexidine 2% Cloths 1 Application(s) Topical daily  epoetin suzette (PROCRIT) Injectable 4000 Unit(s) IV Push <User Schedule>  ferrous    sulfate 325 milliGRAM(s) Oral daily  gabapentin 300 milliGRAM(s) Oral two times a day  heparin   Injectable 5000 Unit(s) SubCutaneous every 8 hours  hydrALAZINE 50 milliGRAM(s) Oral three times a day  insulin lispro (ADMELOG) corrective regimen sliding scale   SubCutaneous Before meals and at bedtime  lidocaine   4% Patch 1 Patch Transdermal daily  metoprolol succinate ER 25 milliGRAM(s) Oral daily  NIFEdipine XL 90 milliGRAM(s) Oral daily  predniSONE   Tablet 10 milliGRAM(s) Oral daily  sevelamer carbonate 1600 milliGRAM(s) Oral three times a day        VITALS:  T(F): 97.9 (24 @ 12:00), Max: 99.3 (24 @ 23:43)  HR: 87 (24 @ 15:44)  BP: 137/74 (24 @ 15:44)  RR: 20 (24 @ 15:44)  SpO2: 96% (24 @ 15:44)  Wt(kg): --     @ 07:01  -   @ 07:00  --------------------------------------------------------  IN: 670 mL / OUT: 0 mL / NET: 670 mL     @ 07:01  -   @ 16:41  --------------------------------------------------------  IN: 300 mL / OUT: 90 mL / NET: 210 mL        PHYSICAL EXAM:  Constitutional: NAD  HEENT: anicteric sclera, oropharynx clear.  Neck: No JVD  Respiratory: CTAB, no wheezes, rales or rhonchi  Cardiovascular: S1, S2, RRR  Gastrointestinal: BS+, soft, NT/ND  Extremities: No peripheral edema  Neurological: A/O x 3, no focal deficits.  Vascular Access: IJ permacath     LABS:      138  |  100  |  38<H>  ----------------------------<  194<H>  4.8   |  29  |  5.56<H>    Ca    8.9      26 Mar 2024 03:45  Phos  4.4       Mg     2.5         TPro  6.2  /  Alb  2.7<L>  /  TBili  0.3  /  DBili      /  AST  20  /  ALT  33  /  AlkPhos  71      Creatinine Trend: 5.56 <--, 3.53 <--, 2.62 <--, 4.73 <--                        9.6    7.69  )-----------( 244      ( 26 Mar 2024 03:45 )             30.4     Urine Studies:  Urinalysis Basic - ( 26 Mar 2024 13:00 )    Color: Yellow / Appearance: Clear / S.012 / pH:   Gluc:  / Ketone: Negative mg/dL  / Bili: Negative / Urobili: 0.2 mg/dL   Blood:  / Protein: >=1000 mg/dL / Nitrite: Negative   Leuk Esterase: Negative / RBC: 1 /HPF / WBC 3 /HPF   Sq Epi:  / Non Sq Epi:  / Bacteria: Negative /HPF        RADIOLOGY & ADDITIONAL STUDIES:

## 2024-03-26 NOTE — PROGRESS NOTE ADULT - SUBJECTIVE AND OBJECTIVE BOX
INTERVAL HPI/OVERNIGHT EVENTS:     PRESSORS: [ ] YES [ ] NO  WHICH:    ANTIBIOTICS:                  DATE STARTED:  ANTIBIOTICS:                  DATE STARTED:  ANTIBIOTICS:                  DATE STARTED:    Antimicrobial:    Cardiovascular:  hydrALAZINE 50 milliGRAM(s) Oral three times a day  metoprolol succinate ER 25 milliGRAM(s) Oral daily  NIFEdipine XL 60 milliGRAM(s) Oral daily    Pulmonary:    Hematalogic:  heparin   Injectable 5000 Unit(s) SubCutaneous every 8 hours    Other:  acetaminophen     Tablet .. 650 milliGRAM(s) Oral every 6 hours PRN  atorvastatin 10 milliGRAM(s) Oral at bedtime  epoetin suzette (PROCRIT) Injectable 4000 Unit(s) IV Push <User Schedule>  ferrous    sulfate 325 milliGRAM(s) Oral daily  gabapentin 300 milliGRAM(s) Oral two times a day  insulin lispro (ADMELOG) corrective regimen sliding scale   SubCutaneous Before meals and at bedtime  lidocaine   4% Patch 1 Patch Transdermal daily  melatonin 5 milliGRAM(s) Oral at bedtime PRN  predniSONE   Tablet 10 milliGRAM(s) Oral daily  sevelamer carbonate 1600 milliGRAM(s) Oral three times a day      Drug Dosing Weight  Height (cm): 152.4 (24 Mar 2024 04:53)  Weight (kg): 46.8 (24 Mar 2024 08:41)  BMI (kg/m2): 20.2 (24 Mar 2024 08:41)  BSA (m2): 1.41 (24 Mar 2024 08:41)    CENTRAL LINE: [ ] YES [ ] NO  LOCATION:   DATE INSERTED:  REMOVE: [ ] YES [ ] NO  EXPLAIN:    MCINTOSH: [ ] YES [ ] NO    DATE INSERTED:  REMOVE:  [ ] YES [ ] NO  EXPLAIN:    A-LINE:  [ ] YES [ ] NO  LOCATION:   DATE INSERTED:  REMOVE:  [ ] YES [ ] NO  EXPLAIN:    PMH/Social Hx/Fam Hx -reviewed admission note, no change since admission  PAST MEDICAL & SURGICAL HISTORY:  Sarcoidosis      Diabetes      Hypertension      Hyperlipidemia      Chronic kidney disease (CKD), stage III (moderate)      Seizure disorder      No significant past surgical history        Heart faliure: acute [ ] chronic [ ] acute or chronic [ ] diastolic [ ] systolic [ ] combied systolic and diastolic[ ]  MIKALA: ATN[ ] renal medullary necrosis [ ] CKD I [ ]CKDII [ ]CKD III [ ]CKD IV [ ]CKD V [ ]Other pathological lesions [ ]  Abdominal Nutrition Status: malnutrition [ ] cachexia [ ] morbid obesity/BMI=40 [ ] Supplement ordered [___________]     T(C): 37.2 (03-26-24 @ 05:00), Max: 37.4 (03-25-24 @ 09:00)  HR: 87 (03-26-24 @ 07:00)  BP: 171/90 (03-26-24 @ 07:00)  BP(mean): 115 (03-26-24 @ 07:00)  ABP: --  ABP(mean): --  RR: 16 (03-26-24 @ 07:00)  SpO2: 96% (03-26-24 @ 07:00)  Wt(kg): --          03-25 @ 07:01  -  03-26 @ 07:00  --------------------------------------------------------  IN: 670 mL / OUT: 0 mL / NET: 670 mL            PHYSICAL EXAM:    GENERAL: [ ]NAD, [ ]well-groomed, [ ]well-developed  HEAD:  [ ]Atraumatic, [ ]Normocephalic  EYES: [ ]EOMI, [ ]PERRLA, [ ]conjunctiva and sclera clear  ENMT: [ ]No tonsillar erythema, exudates, or enlargement; [ ]Moist mucous membranes, [ ]Good dentition, [ ]No lesions  NECK: [ ]Supple, normal appearance, [ ]No JVD; [ ]Normal thyroid; [ ]Trachea midline  NERVOUS SYSTEM:  [ ]Alert & Oriented X3, [ ]Good concentration; [ ]Motor Strength 5/5 B/L upper and lower extremities; [ ]DTRs 2+ intact and symmetric  CHEST/LUNG: [ ]No chest deformity; [ ]Normal percussion bilaterally; [ ]No rales, rhonchi, wheezing; [ ]Crackles at bases  HEART: [ ]Regular rate and rhythm; [ ]No murmurs, rubs, or gallops  ABDOMEN: [ ]Soft, Nontender, Nondistended; [ ]Bowel sounds present  EXTREMITIES:  [ ]2+ Peripheral Pulses, [ ]No clubbing, cyanosis, or edema [ ]Bilat lower extremity edema  LYMPH: [ ]No lymphadenopathy noted  SKIN: [ ]No rashes or lesions; [ ]Good capillary refill      LABS:  CBC Full  -  ( 26 Mar 2024 03:45 )  WBC Count : 7.69 K/uL  RBC Count : 3.17 M/uL  Hemoglobin : 9.6 g/dL  Hematocrit : 30.4 %  Platelet Count - Automated : 244 K/uL  Mean Cell Volume : 95.9 fl  Mean Cell Hemoglobin : 30.3 pg  Mean Cell Hemoglobin Concentration : 31.6 gm/dL  Auto Neutrophil # : x  Auto Lymphocyte # : x  Auto Monocyte # : x  Auto Eosinophil # : x  Auto Basophil # : x  Auto Neutrophil % : x  Auto Lymphocyte % : x  Auto Monocyte % : x  Auto Eosinophil % : x  Auto Basophil % : x    03-26    138  |  100  |  38<H>  ----------------------------<  194<H>  4.8   |  29  |  5.56<H>    Ca    8.9      26 Mar 2024 03:45  Phos  4.4     03-26  Mg     2.5     03-26    TPro  6.2  /  Alb  2.7<L>  /  TBili  0.3  /  DBili  x   /  AST  20  /  ALT  33  /  AlkPhos  71  03-25      Urinalysis Basic - ( 26 Mar 2024 03:45 )    Color: x / Appearance: x / SG: x / pH: x  Gluc: 194 mg/dL / Ketone: x  / Bili: x / Urobili: x   Blood: x / Protein: x / Nitrite: x   Leuk Esterase: x / RBC: x / WBC x   Sq Epi: x / Non Sq Epi: x / Bacteria: x          RADIOLOGY & ADDITIONAL STUDIES REVIEWED:      [ ]GOALS OF CARE DISCUSSION WITH PATIENT/FAMILY/PROXY:    CRITICAL CARE TIME SPENT: 35 minutes INTERVAL HPI/OVERNIGHT EVENTS: Patient desaturated to 80s; improved with O2    Cardiovascular:  hydrALAZINE 50 milliGRAM(s) Oral three times a day  metoprolol succinate ER 25 milliGRAM(s) Oral daily  NIFEdipine XL 60 milliGRAM(s) Oral daily    Pulmonary:    Hematalogic:  heparin   Injectable 5000 Unit(s) SubCutaneous every 8 hours    Other:  acetaminophen     Tablet .. 650 milliGRAM(s) Oral every 6 hours PRN  atorvastatin 10 milliGRAM(s) Oral at bedtime  epoetin suzette (PROCRIT) Injectable 4000 Unit(s) IV Push <User Schedule>  ferrous    sulfate 325 milliGRAM(s) Oral daily  gabapentin 300 milliGRAM(s) Oral two times a day  insulin lispro (ADMELOG) corrective regimen sliding scale   SubCutaneous Before meals and at bedtime  lidocaine   4% Patch 1 Patch Transdermal daily  melatonin 5 milliGRAM(s) Oral at bedtime PRN  predniSONE   Tablet 10 milliGRAM(s) Oral daily  sevelamer carbonate 1600 milliGRAM(s) Oral three times a day      Drug Dosing Weight  Height (cm): 152.4 (24 Mar 2024 04:53)  Weight (kg): 46.8 (24 Mar 2024 08:41)  BMI (kg/m2): 20.2 (24 Mar 2024 08:41)  BSA (m2): 1.41 (24 Mar 2024 08:41)      PMH/Social Hx/Fam Hx -reviewed admission note, no change since admission  PAST MEDICAL & SURGICAL HISTORY:  Sarcoidosis      Diabetes      Hypertension      Hyperlipidemia      Chronic kidney disease (CKD), stage III (moderate)      Seizure disorder      No significant past surgical history        Heart faliure: acute [ ] chronic [ ] acute or chronic [ ] diastolic [ ] systolic [ ] combied systolic and diastolic[ ]  MIKALA: ATN[ ] renal medullary necrosis [ ] CKD I [ ]CKDII [ ]CKD III [ ]CKD IV [ ]CKD V [ ]Other pathological lesions [ ]  Abdominal Nutrition Status: malnutrition [ ] cachexia [ ] morbid obesity/BMI=40 [ ] Supplement ordered [___________]     T(C): 37.2 (03-26-24 @ 05:00), Max: 37.4 (03-25-24 @ 09:00)  HR: 87 (03-26-24 @ 07:00)  BP: 171/90 (03-26-24 @ 07:00)  BP(mean): 115 (03-26-24 @ 07:00)  ABP: --  ABP(mean): --  RR: 16 (03-26-24 @ 07:00)  SpO2: 96% (03-26-24 @ 07:00)  Wt(kg): --          03-25 @ 07:01  -  03-26 @ 07:00  --------------------------------------------------------  IN: 670 mL / OUT: 0 mL / NET: 670 mL      PHYSICAL EXAM:    GENERAL: NAD  HEAD:  Atraumatic, Normocephalic  EYES: EOMI, PERRLA, conjunctiva and sclera clear  NECK: Supple, normal appearance  NERVOUS SYSTEM:  Alert & Oriented X3  CHEST/LUNG: Crackles bilaterally  HEART: Regular rate and rhythm  ABDOMEN: Soft, Nontender, Nondistended; Bowel sounds present  EXTREMITIES:  2+ Peripheral Pulses  SKIN: No rashes or lesions;  Good capillary refill; permacath stable no erythema      LABS:  CBC Full  -  ( 26 Mar 2024 03:45 )  WBC Count : 7.69 K/uL  RBC Count : 3.17 M/uL  Hemoglobin : 9.6 g/dL  Hematocrit : 30.4 %  Platelet Count - Automated : 244 K/uL  Mean Cell Volume : 95.9 fl  Mean Cell Hemoglobin : 30.3 pg  Mean Cell Hemoglobin Concentration : 31.6 gm/dL  Auto Neutrophil # : x  Auto Lymphocyte # : x  Auto Monocyte # : x  Auto Eosinophil # : x  Auto Basophil # : x  Auto Neutrophil % : x  Auto Lymphocyte % : x  Auto Monocyte % : x  Auto Eosinophil % : x  Auto Basophil % : x    03-26    138  |  100  |  38<H>  ----------------------------<  194<H>  4.8   |  29  |  5.56<H>    Ca    8.9      26 Mar 2024 03:45  Phos  4.4     03-26  Mg     2.5     03-26    TPro  6.2  /  Alb  2.7<L>  /  TBili  0.3  /  DBili  x   /  AST  20  /  ALT  33  /  AlkPhos  71  03-25      Urinalysis Basic - ( 26 Mar 2024 03:45 )    Color: x / Appearance: x / SG: x / pH: x  Gluc: 194 mg/dL / Ketone: x  / Bili: x / Urobili: x   Blood: x / Protein: x / Nitrite: x   Leuk Esterase: x / RBC: x / WBC x   Sq Epi: x / Non Sq Epi: x / Bacteria: x

## 2024-03-26 NOTE — PROGRESS NOTE ADULT - ASSESSMENT
ASSESSMENT  53 year old female from home ambulates independently, with PMH of HTN, DM2, ESRD on HD M/W/F (last session 3/22 )sarcoidosis on prednisone, anemia, seizure disorder (no longer on meds) who presented to the ED for shortness of breath admitted for AHRF secondary to pulmonary edema due to fluid overload in the setting of ESRD requiring urgent hemodialysis     _________CNS___________  #No active issues    #Seizure Disorder  -not on any meds     _________CVS___________  #Hypertensive Emergency  -started on Nitroglycerin drip in ED. MAP should be reduced gradually by approximately 25% in the next 24 hours. Target blood pressure of <180/<120 mmHg for the first hour and <160/<110 mmHg for the next 23 hours.  -hold home BP meds   -EKG NSR     _________RESP__________  #AHRF  -resolving; patient received HD 3/24; net negative 3.1L  -no on 2L NC  -Lungs clear    #Sarcoidosis of the Lung  -c/w 10mg prednisone   will follow up with outpatient pulmonologist Dr. Leslie April 2024  ___________GI____________  #No active issues    ________ RENAL__________  #ESRD MWF change to TThSat makes no urine  -last HD was on [place] 3/24; 3.1L removed  -Dr. Estrada consulted, planned for urgent HD via Right shiley catheter   -Pt states she no longer makes any urine  Renal Ultrasound:No evidence of hydronephrosis. Renal parenchymal disease.      __________MSK___________  #No active issues     ___________ID____________  #No active issues     _________ENDO__________  #DM  -SSI     ______HEME/ONC_______  #Leukocytosis  likely reactive, afebrile denies any recent illness     _________SKIN____________  #No active issues     ________PROPHY_______  #DVT heparin sub q  #GI PPI      ______GOC/DISPO___________  DG to Medicine     ASSESSMENT  53 year old female from home ambulates independently, with PMH of HTN, DM2, ESRD on HD M/W/F (last session 3/22 )sarcoidosis on prednisone, anemia, seizure disorder (no longer on meds) who presented to the ED for shortness of breath admitted for AHRF secondary to pulmonary edema due to fluid overload in the setting of ESRD requiring urgent hemodialysis     _________CNS___________  #No active issues  #Seizure Disorder  -not on any meds     _________CVS___________  #Hypertension  Continue     _________RESP__________  #AHRF  -resolving; patient received HD 3/24; net negative 3.1L  -no on 2L NC  -Lungs clear    #Sarcoidosis of the Lung  -c/w 10mg prednisone   will follow up with outpatient pulmonologist Dr. Leslie April 2024  ___________GI____________  #No active issues    ________ RENAL__________  #ESRD MWF change to TThSat makes no urine  -last HD was on [place] 3/24; 3.1L removed  -Dr. Estrada consulted, planned for urgent HD via Right shiley catheter   -Pt states she no longer makes any urine  Renal Ultrasound:No evidence of hydronephrosis. Renal parenchymal disease.      __________MSK___________  #No active issues     ___________ID____________  #No active issues     _________ENDO__________  #DM  -SSI     ______HEME/ONC_______  #Leukocytosis  likely reactive, afebrile denies any recent illness     _________SKIN____________  #No active issues     ________PROPHY_______  #DVT heparin sub q  #GI PPI      ______GOC/DISPO___________  DG to Medicine     ASSESSMENT  53 year old female from home ambulates independently, with PMH of HTN, DM2, ESRD on HD M/W/F (last session 3/22 )sarcoidosis on prednisone, anemia, seizure disorder (no longer on meds) who presented to the ED for shortness of breath admitted for AHRF secondary to pulmonary edema due to fluid overload in the setting of ESRD requiring urgent hemodialysis     _________CNS___________  #No active issues  #Seizure Disorder  -not on any meds     _________CVS___________  #Hypertension  Continue     _________RESP__________  #AHRF  -resolving; patient received HD 3/24; net negative 3.1L  -now on 2L NC; patient has had recurrent issues with oxygenation given fluid overload  -lungs now with crackles    #Sarcoidosis of the Lung  -c/w 10mg prednisone   will follow up with outpatient pulmonologist Dr. Leslie April 2024    #Bronchiectasis  Given long history of sarcoid and bronchiectasis; patient will benefit from Airway clearance therapy;  [ ] Acapella    ___________GI____________  #No active issues    ________ RENAL__________  #ESRD MWF change to TThSat makes no urine  -last HD was on [place] 3/24; 3.1L removed  -Dr. Estrada consulted, planned for urgent HD via Right shiley catheter   -Pt states she no longer makes any urine  Renal Ultrasound:No evidence of hydronephrosis. Renal parenchymal disease.      __________MSK___________  #No active issues     ___________ID____________  #No active issues     _________ENDO__________  #DM  -SSI     ______HEME/ONC_______  #Leukocytosis  likely reactive, afebrile denies any recent illness     _________SKIN____________  #No active issues     ________PROPHY_______  #DVT heparin sub q  #GI PPI      ______GOC/DISPO___________  DG to Medicine     ASSESSMENT  53 year old female from home ambulates independently, with PMH of HTN, DM2, ESRD on HD M/W/F (last session 3/22 )sarcoidosis on prednisone, anemia, seizure disorder (no longer on meds) who presented to the ED for shortness of breath admitted for AHRF secondary to pulmonary edema due to fluid overload in the setting of ESRD requiring urgent hemodialysis     _________CNS___________  #No active issues  #Seizure Disorder  -not on any meds     _________CVS___________  #Hypertension  Continue home nifedipine,; add on home medications as blood pressure stabilizes    _________RESP__________  #AHRF  -resolving; patient received HD 3/24; net negative 3.1L  -now on 2L NC; patient has had recurrent issues with oxygenation given fluid overload  -lungs now with crackles  - wean O2 if possible; however if unable to;may DC with Home O2    #Sarcoidosis of the Lung  -c/w 10mg prednisone   will follow up with outpatient pulmonologist Dr. Leslie April 2024    #Bronchiectasis  Given long history of sarcoid and bronchiectasis; patient will benefit from Airway clearance therapy;  [ ] Acapella    ___________GI____________  #No active issues    ________ RENAL__________  #ESRD MWF change to TThSat makes no urine  -last HD was on [place] 3/24; 3.1L removed  -Dr. Estrada consulted, planned for urgent HD via Right shiley catheter   -Pt states she no longer makes any urine  Renal Ultrasound:No evidence of hydronephrosis. Renal parenchymal disease.      __________MSK___________  #No active issues     ___________ID____________  #No active issues     _________ENDO__________  #DM  -SSI     ______HEME/ONC_______  #Leukocytosis  likely reactive, afebrile denies any recent illness     _________SKIN____________  #No active issues     ________PROPHY_______  #DVT heparin sub q  #GI PPI      ______GOC/DISPO___________  DG to Medicine

## 2024-03-27 ENCOUNTER — TRANSCRIPTION ENCOUNTER (OUTPATIENT)
Age: 54
End: 2024-03-27

## 2024-03-27 VITALS
SYSTOLIC BLOOD PRESSURE: 152 MMHG | TEMPERATURE: 98 F | OXYGEN SATURATION: 98 % | HEART RATE: 86 BPM | RESPIRATION RATE: 16 BRPM | DIASTOLIC BLOOD PRESSURE: 77 MMHG

## 2024-03-27 LAB
ALBUMIN SERPL ELPH-MCNC: 3 G/DL — LOW (ref 3.5–5)
ALP SERPL-CCNC: 87 U/L — SIGNIFICANT CHANGE UP (ref 40–120)
ALT FLD-CCNC: 28 U/L DA — SIGNIFICANT CHANGE UP (ref 10–60)
ANION GAP SERPL CALC-SCNC: 5 MMOL/L — SIGNIFICANT CHANGE UP (ref 5–17)
AST SERPL-CCNC: 16 U/L — SIGNIFICANT CHANGE UP (ref 10–40)
BILIRUB DIRECT SERPL-MCNC: 0.2 MG/DL — SIGNIFICANT CHANGE UP (ref 0–0.3)
BILIRUB INDIRECT FLD-MCNC: 0.2 MG/DL — SIGNIFICANT CHANGE UP (ref 0.2–1)
BILIRUB SERPL-MCNC: 0.4 MG/DL — SIGNIFICANT CHANGE UP (ref 0.2–1.2)
BUN SERPL-MCNC: 17 MG/DL — SIGNIFICANT CHANGE UP (ref 7–18)
CALCIUM SERPL-MCNC: 8.9 MG/DL — SIGNIFICANT CHANGE UP (ref 8.4–10.5)
CHLORIDE SERPL-SCNC: 98 MMOL/L — SIGNIFICANT CHANGE UP (ref 96–108)
CO2 SERPL-SCNC: 30 MMOL/L — SIGNIFICANT CHANGE UP (ref 22–31)
CREAT SERPL-MCNC: 3.57 MG/DL — HIGH (ref 0.5–1.3)
EGFR: 15 ML/MIN/1.73M2 — LOW
GLUCOSE BLDC GLUCOMTR-MCNC: 311 MG/DL — HIGH (ref 70–99)
GLUCOSE SERPL-MCNC: 161 MG/DL — HIGH (ref 70–99)
HCT VFR BLD CALC: 31.7 % — LOW (ref 34.5–45)
HGB BLD-MCNC: 10.4 G/DL — LOW (ref 11.5–15.5)
MAGNESIUM SERPL-MCNC: 2.4 MG/DL — SIGNIFICANT CHANGE UP (ref 1.6–2.6)
MCHC RBC-ENTMCNC: 30.3 PG — SIGNIFICANT CHANGE UP (ref 27–34)
MCHC RBC-ENTMCNC: 32.8 GM/DL — SIGNIFICANT CHANGE UP (ref 32–36)
MCV RBC AUTO: 92.4 FL — SIGNIFICANT CHANGE UP (ref 80–100)
MRSA PCR RESULT.: DETECTED
NRBC # BLD: 0 /100 WBCS — SIGNIFICANT CHANGE UP (ref 0–0)
PHOSPHATE SERPL-MCNC: 4 MG/DL — SIGNIFICANT CHANGE UP (ref 2.5–4.5)
PLATELET # BLD AUTO: 262 K/UL — SIGNIFICANT CHANGE UP (ref 150–400)
POTASSIUM SERPL-MCNC: 4.1 MMOL/L — SIGNIFICANT CHANGE UP (ref 3.5–5.3)
POTASSIUM SERPL-SCNC: 4.1 MMOL/L — SIGNIFICANT CHANGE UP (ref 3.5–5.3)
PROT SERPL-MCNC: 6.9 G/DL — SIGNIFICANT CHANGE UP (ref 6–8.3)
RBC # BLD: 3.43 M/UL — LOW (ref 3.8–5.2)
RBC # FLD: 15.5 % — HIGH (ref 10.3–14.5)
S AUREUS DNA NOSE QL NAA+PROBE: DETECTED
SODIUM SERPL-SCNC: 133 MMOL/L — LOW (ref 135–145)
WBC # BLD: 7.83 K/UL — SIGNIFICANT CHANGE UP (ref 3.8–10.5)
WBC # FLD AUTO: 7.83 K/UL — SIGNIFICANT CHANGE UP (ref 3.8–10.5)

## 2024-03-27 PROCEDURE — 84100 ASSAY OF PHOSPHORUS: CPT

## 2024-03-27 PROCEDURE — 85025 COMPLETE CBC W/AUTO DIFF WBC: CPT

## 2024-03-27 PROCEDURE — 84295 ASSAY OF SERUM SODIUM: CPT

## 2024-03-27 PROCEDURE — 81001 URINALYSIS AUTO W/SCOPE: CPT

## 2024-03-27 PROCEDURE — 87637 SARSCOV2&INF A&B&RSV AMP PRB: CPT

## 2024-03-27 PROCEDURE — 83605 ASSAY OF LACTIC ACID: CPT

## 2024-03-27 PROCEDURE — 82330 ASSAY OF CALCIUM: CPT

## 2024-03-27 PROCEDURE — 86703 HIV-1/HIV-2 1 RESULT ANTBDY: CPT

## 2024-03-27 PROCEDURE — 0225U NFCT DS DNA&RNA 21 SARSCOV2: CPT

## 2024-03-27 PROCEDURE — 36415 COLL VENOUS BLD VENIPUNCTURE: CPT

## 2024-03-27 PROCEDURE — 82803 BLOOD GASES ANY COMBINATION: CPT

## 2024-03-27 PROCEDURE — 87640 STAPH A DNA AMP PROBE: CPT

## 2024-03-27 PROCEDURE — 93005 ELECTROCARDIOGRAM TRACING: CPT

## 2024-03-27 PROCEDURE — 83735 ASSAY OF MAGNESIUM: CPT

## 2024-03-27 PROCEDURE — 87641 MR-STAPH DNA AMP PROBE: CPT

## 2024-03-27 PROCEDURE — 84132 ASSAY OF SERUM POTASSIUM: CPT

## 2024-03-27 PROCEDURE — 96375 TX/PRO/DX INJ NEW DRUG ADDON: CPT

## 2024-03-27 PROCEDURE — 82962 GLUCOSE BLOOD TEST: CPT

## 2024-03-27 PROCEDURE — 94640 AIRWAY INHALATION TREATMENT: CPT

## 2024-03-27 PROCEDURE — 96374 THER/PROPH/DIAG INJ IV PUSH: CPT

## 2024-03-27 PROCEDURE — 71045 X-RAY EXAM CHEST 1 VIEW: CPT

## 2024-03-27 PROCEDURE — 80076 HEPATIC FUNCTION PANEL: CPT

## 2024-03-27 PROCEDURE — 99291 CRITICAL CARE FIRST HOUR: CPT | Mod: 25

## 2024-03-27 PROCEDURE — 85027 COMPLETE CBC AUTOMATED: CPT

## 2024-03-27 PROCEDURE — 80048 BASIC METABOLIC PNL TOTAL CA: CPT

## 2024-03-27 PROCEDURE — 99239 HOSP IP/OBS DSCHRG MGMT >30: CPT

## 2024-03-27 PROCEDURE — 99261: CPT

## 2024-03-27 PROCEDURE — 80053 COMPREHEN METABOLIC PANEL: CPT

## 2024-03-27 PROCEDURE — 76775 US EXAM ABDO BACK WALL LIM: CPT

## 2024-03-27 RX ADMIN — SEVELAMER CARBONATE 1600 MILLIGRAM(S): 2400 POWDER, FOR SUSPENSION ORAL at 05:59

## 2024-03-27 RX ADMIN — CHLORHEXIDINE GLUCONATE 1 APPLICATION(S): 213 SOLUTION TOPICAL at 11:55

## 2024-03-27 RX ADMIN — GABAPENTIN 300 MILLIGRAM(S): 400 CAPSULE ORAL at 05:58

## 2024-03-27 RX ADMIN — Medication 10 MILLIGRAM(S): at 05:59

## 2024-03-27 RX ADMIN — HEPARIN SODIUM 5000 UNIT(S): 5000 INJECTION INTRAVENOUS; SUBCUTANEOUS at 05:58

## 2024-03-27 RX ADMIN — Medication 90 MILLIGRAM(S): at 06:21

## 2024-03-27 RX ADMIN — LIDOCAINE 1 PATCH: 4 CREAM TOPICAL at 11:56

## 2024-03-27 RX ADMIN — SEVELAMER CARBONATE 1600 MILLIGRAM(S): 2400 POWDER, FOR SUSPENSION ORAL at 13:53

## 2024-03-27 RX ADMIN — Medication 50 MILLIGRAM(S): at 05:59

## 2024-03-27 RX ADMIN — Medication 325 MILLIGRAM(S): at 11:56

## 2024-03-27 RX ADMIN — LIDOCAINE 1 PATCH: 4 CREAM TOPICAL at 00:48

## 2024-03-27 RX ADMIN — Medication 4: at 11:55

## 2024-03-27 RX ADMIN — Medication 25 MILLIGRAM(S): at 05:59

## 2024-03-27 RX ADMIN — Medication 50 MILLIGRAM(S): at 13:53

## 2024-03-27 RX ADMIN — Medication 2: at 08:29

## 2024-03-27 NOTE — DISCHARGE NOTE NURSING/CASE MANAGEMENT/SOCIAL WORK - PATIENT PORTAL LINK FT
You can access the FollowMyHealth Patient Portal offered by [facility] by registering at the following website: [email or web address]. By joining [employer]’s FollowMyHealth portal, you will also be able to view your health information using other applications (apps) compatible with our system.

## 2024-03-27 NOTE — CHART NOTE - NSCHARTNOTEFT_GEN_A_CORE
awaits D/C home.  resume HD tommorrow in the evening shift- PT aware  compliance with fluids and salt restriction discussed

## 2024-03-27 NOTE — DISCHARGE NOTE PROVIDER - NSDCMRMEDTOKEN_GEN_ALL_CORE_FT
atorvastatin 10 mg oral tablet: 1 tab(s) orally once a day (at bedtime)  ergocalciferol 50 mcg (2000 intl units) oral capsule: 1 cap(s) orally every 7 days  ferrous sulfate 325 mg (65 mg elemental iron) oral tablet: 1 tab(s) orally once a day  gabapentin 300 mg oral capsule: 1 cap(s) orally 2 times a day  hydrALAZINE 50 mg oral tablet: 1 tab(s) orally 3 times a day  Lasix 80 mg oral tablet: 1 tab(s) orally 3 to 4 times a week Please take this medication Tuesday, Thursday, Saturday on the days you are not going for dialysis.  metoprolol succinate 25 mg oral tablet, extended release: 1 tab(s) orally once a day  NIFEdipine 90 mg oral tablet, extended release: 1 tab(s) orally once a day  predniSONE 10 mg oral tablet: 1 tab(s) orally once a day  sevelamer carbonate 800 mg oral tablet: 2 tab(s) orally 3 times a day  Tamiflu 30 mg oral capsule: 1 cap(s) orally once a day Last dose tomorrow after dialysis

## 2024-03-27 NOTE — DISCHARGE NOTE PROVIDER - DISCHARGE DIET
Regular Diet - No restrictions/Low Sodium Diet/Consistent Carbohydrate Diabetic Diets/Renal Diets (for dialysis)/Other Diet Instructions

## 2024-03-27 NOTE — DISCHARGE NOTE PROVIDER - ATTENDING DISCHARGE PHYSICAL EXAMINATION:
HEAD:  Atraumatic, Normocephalic  EYES: EOMI, PERRLA, conjunctiva and sclera clear  NECK: Supple, normal appearance  NERVOUS SYSTEM:  Alert & Oriented X3  CHEST/LUNG: Crackles bilaterally  HEART: Regular rate and rhythm  ABDOMEN: Soft, Nontender, Nondistended; Bowel sounds present  EXTREMITIES:  2+ Peripheral Pulses  SKIN: No rashes or lesions;  Good capillary refill;

## 2024-03-27 NOTE — DISCHARGE NOTE PROVIDER - NSDCCPCAREPLAN_GEN_ALL_CORE_FT
PRINCIPAL DISCHARGE DIAGNOSIS  Diagnosis: Acute respiratory failure with hypoxia  Assessment and Plan of Treatment: You were treated for Acute Hypoxic Respiratory Failure from ESRD fluid overload and Influenza.  Please do not miss dialysis sessions and reduce your fluid intake.  Please follow up with your Nephrologist tomorrow as you are scheduled for dialysis session  Please take your TamiFlu 30mg once tomorrow after your dialysis session.  Your saturation has improved after dialysis.  Please continue taking your medication as prescribed. If you have any questions or concerns about your medication please direct them to your prescribing Healthcare Provider.       PRINCIPAL DISCHARGE DIAGNOSIS  Diagnosis: Acute respiratory failure with hypoxia  Assessment and Plan of Treatment: You were treated for Acute Hypoxic Respiratory Failure from ESRD fluid overload and Influenza.  Please do not miss dialysis sessions and reduce your fluid intake.  Please follow up with your Nephrologist tomorrow as you are scheduled for dialysis session  Please take your TamiFlu 30mg once tomorrow after your dialysis session.  Your saturation has improved after dialysis.  Please continue taking your medication as prescribed. If you have any questions or concerns about your medication please direct them to your prescribing Healthcare Provider.        SECONDARY DISCHARGE DIAGNOSES  Diagnosis: ESRD on dialysis  Assessment and Plan of Treatment: You were treated for Acute Hypoxic Respiratory Failure from ESRD fluid overload and Influenza.  Please do not miss dialysis sessions and reduce your fluid intake.  Please follow up with your Nephrologist tomorrow as you are scheduled for dialysis session  Please take your TamiFlu 30mg once tomorrow after your dialysis session.  Your saturation has improved after dialysis.  Please continue taking your medication as prescribed. If you have any questions or concerns about your medication please direct them to your prescribing Healthcare Provider.      Diagnosis: Influenza  Assessment and Plan of Treatment: You were treated for Acute Hypoxic Respiratory Failure from ESRD fluid overload and Influenza.  Please do not miss dialysis sessions and reduce your fluid intake.  Please follow up with your Nephrologist tomorrow as you are scheduled for dialysis session  Please take your TamiFlu 30mg once tomorrow after your dialysis session.  Your saturation has improved after dialysis.  Please continue taking your medication as prescribed. If you have any questions or concerns about your medication please direct them to your prescribing Healthcare Provider.    Diagnosis: Hypertensive emergency  Assessment and Plan of Treatment: You were treated for Hypertensive emergency from ESRD fluid overload. Your blood pressure improved with dialysis and blood pressure medication.  Please do not miss dialysis sessions and reduce your fluid intake.  Please follow up with your Nephrologist tomorrow as you are scheduled for dialysis session  Please continue taking your medication as prescribed. If you have any questions or concerns about your medication please direct them to your prescribing Healthcare Provider.

## 2024-03-27 NOTE — DISCHARGE NOTE PROVIDER - NSDCFUSCHEDAPPT_GEN_ALL_CORE_FT
[facility] [address]  Scheduled Appointment: 04/23/2024    Valentin Leslie  [facility] [address] [facility]  Scheduled Appointment: 04/23/2024    Rosie Fagan  [address]  Scheduled Appointment: 04/30/2024

## 2024-03-30 ENCOUNTER — INPATIENT (INPATIENT)
Facility: HOSPITAL | Age: 54
LOS: 1 days | Discharge: HOME | DRG: 640 | End: 2024-04-01
Attending: STUDENT IN AN ORGANIZED HEALTH CARE EDUCATION/TRAINING PROGRAM | Admitting: STUDENT IN AN ORGANIZED HEALTH CARE EDUCATION/TRAINING PROGRAM
Payer: MEDICARE

## 2024-03-30 VITALS
DIASTOLIC BLOOD PRESSURE: 108 MMHG | HEART RATE: 109 BPM | WEIGHT: 105.82 LBS | OXYGEN SATURATION: 77 % | RESPIRATION RATE: 22 BRPM | HEIGHT: 60 IN | SYSTOLIC BLOOD PRESSURE: 234 MMHG | TEMPERATURE: 99 F

## 2024-03-30 DIAGNOSIS — J81.0 ACUTE PULMONARY EDEMA: ICD-10-CM

## 2024-03-30 LAB
ALBUMIN SERPL ELPH-MCNC: 3.3 G/DL — LOW (ref 3.5–5)
ALP SERPL-CCNC: 101 U/L — SIGNIFICANT CHANGE UP (ref 40–120)
ALT FLD-CCNC: 50 U/L DA — SIGNIFICANT CHANGE UP (ref 10–60)
ANION GAP SERPL CALC-SCNC: 6 MMOL/L — SIGNIFICANT CHANGE UP (ref 5–17)
ANION GAP SERPL CALC-SCNC: 9 MMOL/L — SIGNIFICANT CHANGE UP (ref 5–17)
AST SERPL-CCNC: 120 U/L — HIGH (ref 10–40)
BASOPHILS # BLD AUTO: 0.05 K/UL — SIGNIFICANT CHANGE UP (ref 0–0.2)
BASOPHILS # BLD AUTO: 0.06 K/UL — SIGNIFICANT CHANGE UP (ref 0–0.2)
BASOPHILS NFR BLD AUTO: 0.4 % — SIGNIFICANT CHANGE UP (ref 0–2)
BASOPHILS NFR BLD AUTO: 0.5 % — SIGNIFICANT CHANGE UP (ref 0–2)
BILIRUB SERPL-MCNC: 0.4 MG/DL — SIGNIFICANT CHANGE UP (ref 0.2–1.2)
BUN SERPL-MCNC: 44 MG/DL — HIGH (ref 7–18)
BUN SERPL-MCNC: 45 MG/DL — HIGH (ref 7–18)
CALCIUM SERPL-MCNC: 8.9 MG/DL — SIGNIFICANT CHANGE UP (ref 8.4–10.5)
CALCIUM SERPL-MCNC: 9.2 MG/DL — SIGNIFICANT CHANGE UP (ref 8.4–10.5)
CHLORIDE SERPL-SCNC: 103 MMOL/L — SIGNIFICANT CHANGE UP (ref 96–108)
CHLORIDE SERPL-SCNC: 105 MMOL/L — SIGNIFICANT CHANGE UP (ref 96–108)
CO2 SERPL-SCNC: 26 MMOL/L — SIGNIFICANT CHANGE UP (ref 22–31)
CO2 SERPL-SCNC: 28 MMOL/L — SIGNIFICANT CHANGE UP (ref 22–31)
CREAT SERPL-MCNC: 4.96 MG/DL — HIGH (ref 0.5–1.3)
CREAT SERPL-MCNC: 5.15 MG/DL — HIGH (ref 0.5–1.3)
EGFR: 10 ML/MIN/1.73M2 — LOW
EGFR: 10 ML/MIN/1.73M2 — LOW
EOSINOPHIL # BLD AUTO: 0.55 K/UL — HIGH (ref 0–0.5)
EOSINOPHIL # BLD AUTO: 0.62 K/UL — HIGH (ref 0–0.5)
EOSINOPHIL NFR BLD AUTO: 4.4 % — SIGNIFICANT CHANGE UP (ref 0–6)
EOSINOPHIL NFR BLD AUTO: 5.2 % — SIGNIFICANT CHANGE UP (ref 0–6)
FLUAV AG NPH QL: DETECTED
FLUBV AG NPH QL: SIGNIFICANT CHANGE UP
GLUCOSE BLDC GLUCOMTR-MCNC: 255 MG/DL — HIGH (ref 70–99)
GLUCOSE BLDC GLUCOMTR-MCNC: 260 MG/DL — HIGH (ref 70–99)
GLUCOSE SERPL-MCNC: 161 MG/DL — HIGH (ref 70–99)
GLUCOSE SERPL-MCNC: 220 MG/DL — HIGH (ref 70–99)
HCT VFR BLD CALC: 33.1 % — LOW (ref 34.5–45)
HCT VFR BLD CALC: 39.1 % — SIGNIFICANT CHANGE UP (ref 34.5–45)
HGB BLD-MCNC: 10.3 G/DL — LOW (ref 11.5–15.5)
HGB BLD-MCNC: 12.4 G/DL — SIGNIFICANT CHANGE UP (ref 11.5–15.5)
IMM GRANULOCYTES NFR BLD AUTO: 1 % — HIGH (ref 0–0.9)
IMM GRANULOCYTES NFR BLD AUTO: 1.2 % — HIGH (ref 0–0.9)
LYMPHOCYTES # BLD AUTO: 1.57 K/UL — SIGNIFICANT CHANGE UP (ref 1–3.3)
LYMPHOCYTES # BLD AUTO: 1.77 K/UL — SIGNIFICANT CHANGE UP (ref 1–3.3)
LYMPHOCYTES # BLD AUTO: 12.7 % — LOW (ref 13–44)
LYMPHOCYTES # BLD AUTO: 14.8 % — SIGNIFICANT CHANGE UP (ref 13–44)
MAGNESIUM SERPL-MCNC: 2.9 MG/DL — HIGH (ref 1.6–2.6)
MCHC RBC-ENTMCNC: 30.2 PG — SIGNIFICANT CHANGE UP (ref 27–34)
MCHC RBC-ENTMCNC: 30.6 PG — SIGNIFICANT CHANGE UP (ref 27–34)
MCHC RBC-ENTMCNC: 31.1 GM/DL — LOW (ref 32–36)
MCHC RBC-ENTMCNC: 31.7 GM/DL — LOW (ref 32–36)
MCV RBC AUTO: 96.5 FL — SIGNIFICANT CHANGE UP (ref 80–100)
MCV RBC AUTO: 97.1 FL — SIGNIFICANT CHANGE UP (ref 80–100)
MONOCYTES # BLD AUTO: 0.75 K/UL — SIGNIFICANT CHANGE UP (ref 0–0.9)
MONOCYTES # BLD AUTO: 0.83 K/UL — SIGNIFICANT CHANGE UP (ref 0–0.9)
MONOCYTES NFR BLD AUTO: 6.3 % — SIGNIFICANT CHANGE UP (ref 2–14)
MONOCYTES NFR BLD AUTO: 6.7 % — SIGNIFICANT CHANGE UP (ref 2–14)
NEUTROPHILS # BLD AUTO: 8.61 K/UL — HIGH (ref 1.8–7.4)
NEUTROPHILS # BLD AUTO: 9.27 K/UL — HIGH (ref 1.8–7.4)
NEUTROPHILS NFR BLD AUTO: 72 % — SIGNIFICANT CHANGE UP (ref 43–77)
NEUTROPHILS NFR BLD AUTO: 74.8 % — SIGNIFICANT CHANGE UP (ref 43–77)
NRBC # BLD: 0 /100 WBCS — SIGNIFICANT CHANGE UP (ref 0–0)
NRBC # BLD: 0 /100 WBCS — SIGNIFICANT CHANGE UP (ref 0–0)
PLATELET # BLD AUTO: 258 K/UL — SIGNIFICANT CHANGE UP (ref 150–400)
PLATELET # BLD AUTO: 383 K/UL — SIGNIFICANT CHANGE UP (ref 150–400)
POTASSIUM SERPL-MCNC: 3.8 MMOL/L — SIGNIFICANT CHANGE UP (ref 3.5–5.3)
POTASSIUM SERPL-MCNC: 6.2 MMOL/L — CRITICAL HIGH (ref 3.5–5.3)
POTASSIUM SERPL-SCNC: 3.8 MMOL/L — SIGNIFICANT CHANGE UP (ref 3.5–5.3)
POTASSIUM SERPL-SCNC: 6.2 MMOL/L — CRITICAL HIGH (ref 3.5–5.3)
PROT SERPL-MCNC: 7.9 G/DL — SIGNIFICANT CHANGE UP (ref 6–8.3)
RBC # BLD: 3.41 M/UL — LOW (ref 3.8–5.2)
RBC # BLD: 4.05 M/UL — SIGNIFICANT CHANGE UP (ref 3.8–5.2)
RBC # FLD: 15.9 % — HIGH (ref 10.3–14.5)
RBC # FLD: 15.9 % — HIGH (ref 10.3–14.5)
SARS-COV-2 RNA SPEC QL NAA+PROBE: SIGNIFICANT CHANGE UP
SODIUM SERPL-SCNC: 137 MMOL/L — SIGNIFICANT CHANGE UP (ref 135–145)
SODIUM SERPL-SCNC: 140 MMOL/L — SIGNIFICANT CHANGE UP (ref 135–145)
WBC # BLD: 11.95 K/UL — HIGH (ref 3.8–10.5)
WBC # BLD: 12.39 K/UL — HIGH (ref 3.8–10.5)
WBC # FLD AUTO: 11.95 K/UL — HIGH (ref 3.8–10.5)
WBC # FLD AUTO: 12.39 K/UL — HIGH (ref 3.8–10.5)

## 2024-03-30 PROCEDURE — 99291 CRITICAL CARE FIRST HOUR: CPT

## 2024-03-30 PROCEDURE — 99291 CRITICAL CARE FIRST HOUR: CPT | Mod: GC

## 2024-03-30 PROCEDURE — 71045 X-RAY EXAM CHEST 1 VIEW: CPT | Mod: 26

## 2024-03-30 RX ORDER — NITROGLYCERIN 6.5 MG
10 CAPSULE, EXTENDED RELEASE ORAL
Qty: 50 | Refills: 0 | Status: DISCONTINUED | OUTPATIENT
Start: 2024-03-30 | End: 2024-03-30

## 2024-03-30 RX ORDER — DEXTROSE 50 % IN WATER 50 %
25 SYRINGE (ML) INTRAVENOUS ONCE
Refills: 0 | Status: DISCONTINUED | OUTPATIENT
Start: 2024-03-30 | End: 2024-03-31

## 2024-03-30 RX ORDER — HEPARIN SODIUM 5000 [USP'U]/ML
5000 INJECTION INTRAVENOUS; SUBCUTANEOUS EVERY 12 HOURS
Refills: 0 | Status: DISCONTINUED | OUTPATIENT
Start: 2024-03-30 | End: 2024-04-01

## 2024-03-30 RX ORDER — ACETAMINOPHEN 500 MG
1000 TABLET ORAL ONCE
Refills: 0 | Status: DISCONTINUED | OUTPATIENT
Start: 2024-03-30 | End: 2024-03-30

## 2024-03-30 RX ORDER — INSULIN LISPRO 100/ML
VIAL (ML) SUBCUTANEOUS AT BEDTIME
Refills: 0 | Status: DISCONTINUED | OUTPATIENT
Start: 2024-03-30 | End: 2024-04-01

## 2024-03-30 RX ORDER — ACETAMINOPHEN 500 MG
650 TABLET ORAL EVERY 6 HOURS
Refills: 0 | Status: DISCONTINUED | OUTPATIENT
Start: 2024-03-30 | End: 2024-04-01

## 2024-03-30 RX ORDER — HYDRALAZINE HCL 50 MG
50 TABLET ORAL THREE TIMES A DAY
Refills: 0 | Status: DISCONTINUED | OUTPATIENT
Start: 2024-03-30 | End: 2024-03-31

## 2024-03-30 RX ORDER — FERROUS SULFATE 325(65) MG
325 TABLET ORAL DAILY
Refills: 0 | Status: DISCONTINUED | OUTPATIENT
Start: 2024-03-30 | End: 2024-04-01

## 2024-03-30 RX ORDER — INFLUENZA VIRUS VACCINE 15; 15; 15; 15 UG/.5ML; UG/.5ML; UG/.5ML; UG/.5ML
0.5 SUSPENSION INTRAMUSCULAR ONCE
Refills: 0 | Status: DISCONTINUED | OUTPATIENT
Start: 2024-03-30 | End: 2024-04-01

## 2024-03-30 RX ORDER — INSULIN LISPRO 100/ML
VIAL (ML) SUBCUTANEOUS
Refills: 0 | Status: DISCONTINUED | OUTPATIENT
Start: 2024-03-30 | End: 2024-04-01

## 2024-03-30 RX ORDER — SODIUM CHLORIDE 9 MG/ML
1000 INJECTION, SOLUTION INTRAVENOUS
Refills: 0 | Status: DISCONTINUED | OUTPATIENT
Start: 2024-03-30 | End: 2024-03-31

## 2024-03-30 RX ORDER — ERGOCALCIFEROL 1.25 MG/1
1 CAPSULE ORAL
Qty: 0 | Refills: 0 | DISCHARGE

## 2024-03-30 RX ORDER — GABAPENTIN 400 MG/1
300 CAPSULE ORAL
Refills: 0 | Status: DISCONTINUED | OUTPATIENT
Start: 2024-03-30 | End: 2024-04-01

## 2024-03-30 RX ORDER — SODIUM ZIRCONIUM CYCLOSILICATE 10 G/10G
5 POWDER, FOR SUSPENSION ORAL ONCE
Refills: 0 | Status: COMPLETED | OUTPATIENT
Start: 2024-03-30 | End: 2024-03-30

## 2024-03-30 RX ORDER — FUROSEMIDE 40 MG
80 TABLET ORAL
Refills: 0 | Status: DISCONTINUED | OUTPATIENT
Start: 2024-03-30 | End: 2024-04-01

## 2024-03-30 RX ORDER — ATORVASTATIN CALCIUM 80 MG/1
10 TABLET, FILM COATED ORAL AT BEDTIME
Refills: 0 | Status: DISCONTINUED | OUTPATIENT
Start: 2024-03-30 | End: 2024-04-01

## 2024-03-30 RX ORDER — CALCIUM GLUCONATE 100 MG/ML
1 VIAL (ML) INTRAVENOUS ONCE
Refills: 0 | Status: COMPLETED | OUTPATIENT
Start: 2024-03-30 | End: 2024-03-30

## 2024-03-30 RX ORDER — FUROSEMIDE 40 MG
40 TABLET ORAL ONCE
Refills: 0 | Status: COMPLETED | OUTPATIENT
Start: 2024-03-30 | End: 2024-03-30

## 2024-03-30 RX ORDER — SEVELAMER CARBONATE 2400 MG/1
1600 POWDER, FOR SUSPENSION ORAL THREE TIMES A DAY
Refills: 0 | Status: DISCONTINUED | OUTPATIENT
Start: 2024-03-30 | End: 2024-03-30

## 2024-03-30 RX ORDER — GLUCAGON INJECTION, SOLUTION 0.5 MG/.1ML
1 INJECTION, SOLUTION SUBCUTANEOUS ONCE
Refills: 0 | Status: DISCONTINUED | OUTPATIENT
Start: 2024-03-30 | End: 2024-03-31

## 2024-03-30 RX ORDER — SEVELAMER CARBONATE 2400 MG/1
800 POWDER, FOR SUSPENSION ORAL EVERY 8 HOURS
Refills: 0 | Status: DISCONTINUED | OUTPATIENT
Start: 2024-03-30 | End: 2024-04-01

## 2024-03-30 RX ORDER — NIFEDIPINE 30 MG
90 TABLET, EXTENDED RELEASE 24 HR ORAL DAILY
Refills: 0 | Status: DISCONTINUED | OUTPATIENT
Start: 2024-03-30 | End: 2024-03-31

## 2024-03-30 RX ORDER — NITROGLYCERIN 6.5 MG
0.4 CAPSULE, EXTENDED RELEASE ORAL ONCE
Refills: 0 | Status: COMPLETED | OUTPATIENT
Start: 2024-03-30 | End: 2024-03-30

## 2024-03-30 RX ORDER — METOPROLOL TARTRATE 50 MG
12.5 TABLET ORAL
Refills: 0 | Status: DISCONTINUED | OUTPATIENT
Start: 2024-03-30 | End: 2024-03-31

## 2024-03-30 RX ORDER — SENNA PLUS 8.6 MG/1
2 TABLET ORAL AT BEDTIME
Refills: 0 | Status: DISCONTINUED | OUTPATIENT
Start: 2024-03-30 | End: 2024-04-01

## 2024-03-30 RX ORDER — DEXTROSE 50 % IN WATER 50 %
50 SYRINGE (ML) INTRAVENOUS ONCE
Refills: 0 | Status: DISCONTINUED | OUTPATIENT
Start: 2024-03-30 | End: 2024-03-30

## 2024-03-30 RX ORDER — INSULIN HUMAN 100 [IU]/ML
5 INJECTION, SOLUTION SUBCUTANEOUS ONCE
Refills: 0 | Status: COMPLETED | OUTPATIENT
Start: 2024-03-30 | End: 2024-03-30

## 2024-03-30 RX ORDER — DEXTROSE 50 % IN WATER 50 %
12.5 SYRINGE (ML) INTRAVENOUS ONCE
Refills: 0 | Status: DISCONTINUED | OUTPATIENT
Start: 2024-03-30 | End: 2024-03-31

## 2024-03-30 RX ORDER — DEXTROSE 50 % IN WATER 50 %
50 SYRINGE (ML) INTRAVENOUS ONCE
Refills: 0 | Status: COMPLETED | OUTPATIENT
Start: 2024-03-30 | End: 2024-03-30

## 2024-03-30 RX ORDER — POLYETHYLENE GLYCOL 3350 17 G/17G
17 POWDER, FOR SOLUTION ORAL DAILY
Refills: 0 | Status: DISCONTINUED | OUTPATIENT
Start: 2024-03-30 | End: 2024-04-01

## 2024-03-30 RX ORDER — DEXTROSE 50 % IN WATER 50 %
15 SYRINGE (ML) INTRAVENOUS ONCE
Refills: 0 | Status: DISCONTINUED | OUTPATIENT
Start: 2024-03-30 | End: 2024-03-31

## 2024-03-30 RX ORDER — ACETAMINOPHEN 500 MG
675 TABLET ORAL ONCE
Refills: 0 | Status: COMPLETED | OUTPATIENT
Start: 2024-03-30 | End: 2024-03-30

## 2024-03-30 RX ADMIN — Medication 90 MILLIGRAM(S): at 15:48

## 2024-03-30 RX ADMIN — Medication 10 MILLIGRAM(S): at 14:50

## 2024-03-30 RX ADMIN — POLYETHYLENE GLYCOL 3350 17 GRAM(S): 17 POWDER, FOR SOLUTION ORAL at 17:28

## 2024-03-30 RX ADMIN — Medication 1: at 21:40

## 2024-03-30 RX ADMIN — Medication 3: at 18:19

## 2024-03-30 RX ADMIN — Medication 12.5 MILLIGRAM(S): at 17:28

## 2024-03-30 RX ADMIN — Medication 650 MILLIGRAM(S): at 21:43

## 2024-03-30 RX ADMIN — Medication 325 MILLIGRAM(S): at 12:10

## 2024-03-30 RX ADMIN — Medication 3 MICROGRAM(S)/MIN: at 06:55

## 2024-03-30 RX ADMIN — Medication 650 MILLIGRAM(S): at 22:40

## 2024-03-30 RX ADMIN — ATORVASTATIN CALCIUM 10 MILLIGRAM(S): 80 TABLET, FILM COATED ORAL at 21:40

## 2024-03-30 RX ADMIN — INSULIN HUMAN 5 UNIT(S): 100 INJECTION, SOLUTION SUBCUTANEOUS at 07:39

## 2024-03-30 RX ADMIN — SEVELAMER CARBONATE 800 MILLIGRAM(S): 2400 POWDER, FOR SUSPENSION ORAL at 21:41

## 2024-03-30 RX ADMIN — Medication 50 MILLILITER(S): at 07:38

## 2024-03-30 RX ADMIN — Medication 100 GRAM(S): at 07:39

## 2024-03-30 RX ADMIN — SODIUM ZIRCONIUM CYCLOSILICATE 5 GRAM(S): 10 POWDER, FOR SUSPENSION ORAL at 07:41

## 2024-03-30 RX ADMIN — Medication 270 MILLIGRAM(S): at 16:10

## 2024-03-30 RX ADMIN — SEVELAMER CARBONATE 1600 MILLIGRAM(S): 2400 POWDER, FOR SUSPENSION ORAL at 14:50

## 2024-03-30 RX ADMIN — Medication 675 MILLIGRAM(S): at 16:10

## 2024-03-30 RX ADMIN — Medication 50 MILLIGRAM(S): at 14:50

## 2024-03-30 RX ADMIN — Medication 40 MILLIGRAM(S): at 06:26

## 2024-03-30 RX ADMIN — Medication 0.4 MILLIGRAM(S): at 06:28

## 2024-03-30 RX ADMIN — HEPARIN SODIUM 5000 UNIT(S): 5000 INJECTION INTRAVENOUS; SUBCUTANEOUS at 17:28

## 2024-03-30 RX ADMIN — GABAPENTIN 300 MILLIGRAM(S): 400 CAPSULE ORAL at 17:28

## 2024-03-30 NOTE — ED PROVIDER NOTE - NSTIMEPROVIDERCAREINITIATE_GEN_ER
Our mutual patient is scheduled for procedure: colonoscopy    On: December 29 , 2023     With: Dr. Ashly Reyez MD    He/She is taking the following blood thinner: Plavix (Clopidogrel)    Can this be stopped  5 days prior to the procedure    Physician Approving clearance:   Carlton Feng MD   
Patient's last ECG on 10/29/23. Does patient need to be seen prior to procedure or would you like a letter generated for clearance?
Patient's wife contacted and she reports the patient has had no issues with chest pain or shortness of breath. Letter sent to you.
30-Mar-2024 05:45

## 2024-03-30 NOTE — ED PROVIDER NOTE - OBJECTIVE STATEMENT
53-year-old female hx of HTN, DM2, ESRD on HD M/W/F (last session 3/28), sarcoidosis on prednisone, anemia, seizure disorder (no longer on meds), admission to ICU last week for acute pulmonary edema requiring emergent dialysis, BIBEMS for shortness of breath similar to last week's presentation. Last HD was Thursday, due for it this evening but developed acute shortness of breath today. Hypertensive, tachypneic, hypoxic to 77% on room air, 98% on NRB, refusing Bipap. Renal immediately paged, recommending emergent dialysis. ICU paged at ~6am, waiting for them to see patient. Nitro drip initiated. Calcium/insulin/glucose/lokelma ordered for K 6.2. Will admit to ICU.

## 2024-03-30 NOTE — CONSULT NOTE ADULT - REASON FOR ADMISSION
AHRF secondary to fluid overload in the setting of ESRD
AHRF secondary to fluid overload in the setting of ESRD

## 2024-03-30 NOTE — ED ADULT NURSE NOTE - NSFALLASSESSNEED_ED_ALL_ED
Billing Type: [employer] Bill For Surgical Tray: no Expected Date Of Service: 03/30/2021 Performing Laboratory: [id number] no

## 2024-03-30 NOTE — ED PROVIDER NOTE - WR ORDER DATE AND TIME 1
You can access the FollowMyHealth Patient Portal offered by [facility] by registering at the following website: [email or web address]. By joining [employer]’s FollowMyHealth portal, you will also be able to view your health information using other applications (apps) compatible with our system.
30-Mar-2024 05:46

## 2024-03-30 NOTE — ED PROVIDER NOTE - CLINICAL SUMMARY MEDICAL DECISION MAKING FREE TEXT BOX
53-year-old female hx of HTN, DM2, ESRD on HD M/W/F (last session 3/28), sarcoidosis on prednisone, anemia, seizure disorder (no longer on meds), admission to ICU last week for acute pulmonary edema requiring emergent dialysis, BIBEMS for shortness of breath similar to last week's presentation. Last HD was Thursday, due for it this evening but developed acute shortness of breath today. Hypertensive, tachypneic, hypoxic to 77% on room air, 98% on NRB, refusing Bipap. Renal immediately paged, recommending emergent dialysis. ICU paged at ~6am, waiting for them to see patient. Nitro drip initiated. Calcium/insulin/glucose/lokelma ordered for K 6.2. Will admit to ICU pending approval from ICU

## 2024-03-30 NOTE — H&P ADULT - ATTENDING COMMENTS
52 y/o with PMH of HTN, DM, ESRD, last HD 3/28/24 brought to ER with SOB 2 to fluid overload and hypertensive emergency. Refused  BIPAp in ER. Hypoxemia improved with supplemental oxygen. Started on nitro drip.  Pt was recenly admitted to [facility] with similar presentation and influenza.      A/P Hypertensive emergency   Pulmonary edema   ESRD  Hyperk   DM  sarcoidosis  MICU  Emergency HD  Correct hyper K  Cont with Nitro drip   Avoid rapid Bp correction   F/U FS, Riss   Cont with Prednisone  DVT/Gi prophylaxis

## 2024-03-30 NOTE — ED ADULT NURSE NOTE - ED STAT RN HANDOFF DETAILS
Pt awake alert oriented x3. breathing via non rebreather 15L, noted SOB on exertion. Report given to Rachel. Pt at dialysis and will transfer to ICU.

## 2024-03-30 NOTE — CONSULT NOTE ADULT - ASSESSMENT
53 year old female from home ambulates independently, with PMH of HTN, DM2, ESRD on HD Tue/Thur/Sat (last session 3/28 )sarcoidosis on prednisone, anemia, seizure disorder (no longer on meds) presenting to ED for shortness of breath that started early last night.    ESRD on HD  Schedule TTS  access; Permacath  HD center: [facility]  Consent signed  patient poorly compliant with fluid restriction  plan:  scheduled for HD today with UF as tolerated  Hyperkalemia resolved  renal diet with fluid restriction 1.2L QD  BMP QD    HTN  due to vol overload  UF on HD      Thank you for allowing me to participate in the care of your patient    For any question, call:  Cell # [phone]  Pager # [phone]  Callback # [phone]

## 2024-03-30 NOTE — ED ADULT TRIAGE NOTE - CHIEF COMPLAINT QUOTE
patient reports  difficulty of breathing x today @1am. due for dialysis today @4pm.  rt. upper chest perma cath. dialysis tues-thurs-sat. O2 sat 77% in RA

## 2024-03-30 NOTE — H&P ADULT - ASSESSMENT
ASSESSMENT  53 year old female from home ambulates independently, with PMH of HTN, DM2, ESRD on HD Tue/Thur/Sat (last session 3/28 )sarcoidosis on prednisone, anemia, seizure disorder (no longer on meds) presenting to ED for shortness of breath that started early last night  admitted for AHRF secondary to pulmonary edema due to fluid overload in the setting of ESRD requiring urgent hemodialysis         _________CNS___________  #No active issues    #Seizure Disorder  -not on any meds   _________CVS___________  #Hypertensive Emergency  -started on Nitroglycerin drip in ED. MAP should be reduced gradually by approximately 25% in the next 24 hours. Target blood pressure of <180/<120 mmHg for the first hour and <160/<110 mmHg for the next 23 hours.  -hold home BP meds   -EKG Sinus tachycardia     _________RESP__________  #AHRF  -On Non rebreather saturating 100%  -Emergency HD Dr. Estrada consulted   -Patient in HD now    #Sarcoidosis  -c/w 10mg prednisone   ___________GI____________  #No active issues    ________ RENAL__________  #ESRD MWF  -last HD was on Thursday 3/28   -Dr. Estrada consulted, at emergency HD now   -Pt states she no longer makes any urine    #Hyperkalemia  -s/p 1g Ca, 40  lasix, 5 Insulin, .4 Nitro, and 5 Lokelma   -HD   -f/u post HD labs     __________MSK___________  #No active issues     ___________ID____________  #Influenza A  -tested positive on prior admission 3/24 and again today 3/20. patient received 2 doses of tamiflu while in patient.   -will obtain outpatient records to see if patient received 3rd dose after HD     _________ENDO__________  #DM  -SSI     ______HEME/ONC_______  #Leukocytosis  likely reactive vs positive Influenza A   _________SKIN____________  #No active issues     ________PROPHY_______  #DVT heparin sub q  #GI PPI      ______GOC/DISPO___________  ICU          ASSESSMENT  53 year old female from home ambulates independently, with PMH of HTN, DM2, ESRD on HD Tue/Thur/Sat (last session 3/28 )sarcoidosis on prednisone, anemia, seizure disorder (no longer on meds) presenting to ED for shortness of breath that started early last night  admitted for AHRF secondary to pulmonary edema due to fluid overload in the setting of ESRD requiring urgent hemodialysis         _________CNS___________  #No active issues    #Seizure Disorder  -not on any meds   _________CVS___________  #Hypertensive Emergency  -started on Nitroglycerin drip in ED. MAP should be reduced gradually by approximately 25% in the next 24 hours. Target blood pressure of <180/<120 mmHg for the first hour and <160/<110 mmHg for the next 23 hours.  -hold home BP meds   -EKG Sinus tachycardia     _________RESP__________  #AHRF  -On Non rebreather saturating 100%  -Emergency HD Dr. Estrada consulted   -Patient in HD now    #Sarcoidosis  -c/w 10mg prednisone   ___________GI____________  #No active issues    ________ RENAL__________  #ESRD MWF  -last HD was on Thursday 3/28   -Dr. Estrada consulted, at emergency HD now   -Pt states she no longer makes any urine    #Hyperkalemia  -s/p 1g Ca, 40  lasix, 5 Insulin, .4 Nitro, and 5 Lokelma   -HD   -f/u post HD labs     __________MSK___________  #No active issues     ___________ID____________  #Influenza A  -tested positive on prior admission 3/24 and again today 3/20. patient received 3 total doses of Tamiflu with HD, now completed.   #DM  -SSI     ______HEME/ONC_______  #Leukocytosis  likely reactive vs positive Influenza A   _________SKIN____________  #No active issues     ________PROPHY_______  #DVT heparin sub q  #GI PPI      ______GOC/DISPO___________  ICU

## 2024-03-30 NOTE — H&P ADULT - HISTORY OF PRESENT ILLNESS
HPI:53 year old female from home ambulates independently, with PMH of HTN, DM2, ESRD on HD Tue/Thur/Sat (last session 3/28 )sarcoidosis on prednisone, anemia, seizure disorder (no longer on meds) presenting to ED for shortness of breath that started early last night. Patient was last on HD on Thursday She reports taking her medication and adhering to renal restriction diet. Patient presented to ED last week for similar symptoms and was admitted for emergency HD. At that time, she was found to be Influenza A positive and was started on Tamiflu and given two doses while in the hospital. She was discharged on 3/27. Patient denies any fevers, chills, chest pain, cough, abd pain, n/v, diarrhea, constipation, hematuria, dysuria.    In ED VS T 98.6, , / 108, RR 22, Spo2 77%RA  WBC 12K, K 6.2, Cr 4.96  S/p 2g Ca gluconate, 40 IV lasix, 5 Insulin, .4 Nitroglycerin, 5 Lokelma         PAST MEDICAL & SURGICAL HISTORY:  Sarcoidosis      Diabetes      Hypertension      Hyperlipidemia      Chronic kidney disease (CKD), stage III (moderate)      Seizure disorder      No significant past surgical history          SOCIAL HX:   Denies any smoking, etoh, or drug use.     FAMILY HISTORY:  Family history of diabetes mellitus in first degree relative    ROS:  See HPI     Allergies    No Known Allergies    Intolerances          PHYSICAL EXAM    ICU Vital Signs Last 24 Hrs  T(C): 37 (30 Mar 2024 05:38), Max: 37 (30 Mar 2024 05:38)  T(F): 98.6 (30 Mar 2024 05:38), Max: 98.6 (30 Mar 2024 05:38)  HR: 109 (30 Mar 2024 05:38) (109 - 109)  BP: 234/108 (30 Mar 2024 05:38) (234/108 - 234/108)   RR: 22 (30 Mar 2024 05:38) (22 - 22)  SpO2: 77% (30 Mar 2024 05:38) (77% - 77%)    O2 Parameters below as of 30 Mar 2024 05:38  Patient On (Oxygen Delivery Method): room air            General: moderate distress    HEENT:  MALKA              Lymphatic system: No LN  Lungs: bilateral crackles   Cardiovascular: Regular  Gastrointestinal: Soft, Positive BS  Musculoskeletal: No clubbing.  Moves all extremities.    Skin: Warm.  Intact  Neurological: No motor or sensory deficit         LABS:                          12.4   11.95 )-----------( 383      ( 30 Mar 2024 06:00 )             39.1                                               03-30    137  |  103  |  44<H>  ----------------------------<  161<H>  6.2<HH>   |  28  |  4.96<H>    Ca    9.2      30 Mar 2024 06:00  Mg     2.9     03-30    TPro  7.9  /  Alb  3.3<L>  /  TBili  0.4  /  DBili  x   /  AST  120<H>  /  ALT  50  /  AlkPhos  101  03-30                                             Urinalysis Basic - ( 30 Mar 2024 06:00 )    Color: x / Appearance: x / SG: x / pH: x  Gluc: 161 mg/dL / Ketone: x  / Bili: x / Urobili: x   Blood: x / Protein: x / Nitrite: x   Leuk Esterase: x / RBC: x / WBC x   Sq Epi: x / Non Sq Epi: x / Bacteria: x                                                  LIVER FUNCTIONS - ( 30 Mar 2024 06:00 )  Alb: 3.3 g/dL / Pro: 7.9 g/dL / ALK PHOS: 101 U/L / ALT: 50 U/L DA / AST: 120 U/L / GGT: x                                                                                                                                       CXR:    ECHO:    MEDICATIONS  (STANDING):  nitroglycerin  Infusion 10 MICROgram(s)/Min (3 mL/Hr) IV Continuous <Continuous>    MEDICATIONS  (PRN):

## 2024-03-30 NOTE — PATIENT PROFILE ADULT - FALL HARM RISK - HARM RISK INTERVENTIONS

## 2024-03-30 NOTE — CONSULT NOTE ADULT - SUBJECTIVE AND OBJECTIVE BOX
Time of visit:    CHIEF COMPLAINT: Patient is a 53y old  Female who presents with a chief complaint of AHRF secondary to fluid overload in the setting of ESRD (30 Mar 2024 08:02)      HPI: This is a 53  year old woman  from home ambulates independently, with  HTN, DM2, ESRD on HD Tue/Thur/Sat (last session 3/28 )sarcoidosis on prednisone, anemia, seizure disorder (no longer on meds) presenting to ED for shortness of breath that started early last night. Patient was last on HD on Thursday She reports taking her medication and adhering to renal restriction diet. Patient presented to ED last week for similar symptoms and was admitted for emergency HD. At that time, she was found to be Influenza A positive and was started on Tamiflu and given two doses while in the hospital. She was discharged on 3/27. Patient denies any fevers, chills, chest pain, cough, abd pain, n/v, diarrhea, constipation, hematuria, dysuria.    In ED VS T 98.6, , / 108, RR 22, Spo2 77%RA  WBC 12K, K 6.2, Cr 4.96  S/p 2g Ca gluconate, 40 IV lasix, 5 Insulin, .4 Nitroglycerin, 5 Lokelma                               PAST MEDICAL & SURGICAL HISTORY:  Sarcoidosis      Diabetes      Hypertension      Hyperlipidemia      Chronic kidney disease (CKD), stage III (moderate)      Seizure disorder      No significant past surgical history          Allergies    No Known Allergies    Intolerances        MEDICATIONS  (STANDING):  atorvastatin 10 milliGRAM(s) Oral at bedtime  dextrose 5%. 1000 milliLiter(s) (50 mL/Hr) IV Continuous <Continuous>  dextrose 5%. 1000 milliLiter(s) (100 mL/Hr) IV Continuous <Continuous>  dextrose 50% Injectable 12.5 Gram(s) IV Push once  dextrose 50% Injectable 25 Gram(s) IV Push once  dextrose 50% Injectable 25 Gram(s) IV Push once  ferrous    sulfate 325 milliGRAM(s) Oral daily  gabapentin 300 milliGRAM(s) Oral two times a day  glucagon  Injectable 1 milliGRAM(s) IntraMuscular once  heparin   Injectable 5000 Unit(s) SubCutaneous every 12 hours  hydrALAZINE 50 milliGRAM(s) Oral three times a day  influenza   Vaccine 0.5 milliLiter(s) IntraMuscular once  insulin lispro (ADMELOG) corrective regimen sliding scale   SubCutaneous three times a day before meals  insulin lispro (ADMELOG) corrective regimen sliding scale   SubCutaneous at bedtime  metoprolol tartrate 12.5 milliGRAM(s) Oral two times a day  NIFEdipine XL 90 milliGRAM(s) Oral daily  predniSONE   Tablet 10 milliGRAM(s) Oral daily  sevelamer carbonate 1600 milliGRAM(s) Oral three times a day      MEDICATIONS  (PRN):  dextrose Oral Gel 15 Gram(s) Oral once PRN Blood Glucose LESS THAN 70 milliGRAM(s)/deciliter   Medications up to date at time of exam.    Medications up to date at time of exam.    FAMILY HISTORY:  Family history of diabetes mellitus in first degree relative        SOCIAL HISTORY  Smoking History: [x   ]  none smoking/smoke exposure, [   ] former smoker  Living Condition: [   ] apartment, [   ] private house  Work History:   Travel History: denies recent travel  Illicit Substance Use: denies  Alcohol Use: denies    REVIEW OF SYSTEMS:    CONSTITUTIONAL:  denies fevers, chills, sweats, weight loss    HEENT:  denies diplopia or blurred vision, sore throat or runny nose.    CARDIOVASCULAR:  denies pressure, squeezing, tightness, or heaviness about the chest; no palpitations.    RESPIRATORY:  denies SOB, cough, ENGLISH, wheezing.    GASTROINTESTINAL:  denies abdominal pain, nausea, vomiting or diarrhea.    GENITOURINARY: denies dysuria, frequency or urgency.    NEUROLOGIC:  denies numbness, tingling, seizures or weakness.    PSYCHIATRIC:  denies disorder of thought or mood.    MSK: denies swelling, redness      PHYSICAL EXAMINATION:    GENERAL: The patient is a well-developed, well-nourished, in no apparent distress.     Vital Signs Last 24 Hrs  T(C): 37.3 (30 Mar 2024 12:00), Max: 37.3 (30 Mar 2024 12:00)  T(F): 99.1 (30 Mar 2024 12:00), Max: 99.1 (30 Mar 2024 12:00)  HR: 97 (30 Mar 2024 14:15) (94 - 109)  BP: 151/85 (30 Mar 2024 14:15) (128/76 - 234/108)  BP(mean): 104 (30 Mar 2024 14:15) (92 - 114)  RR: 23 (30 Mar 2024 14:15) (20 - 24)  SpO2: 100% (30 Mar 2024 14:15) (77% - 100%)    Parameters below as of 30 Mar 2024 12:00  Patient On (Oxygen Delivery Method): nasal cannula  O2 Flow (L/min): 2     (if applicable)    Chest Tube (if applicable)    HEENT: Head is normocephalic and atraumatic. Extraocular muscles are intact. Mucous membranes are moist.     NECK: Supple, no palpable adenopathy.    LUNGS: Clear to auscultation, no wheezing, rales, or rhonchi.    HEART: Regular rate and rhythm without murmur.    ABDOMEN: Soft, nontender, and nondistended.  No hepatosplenomegaly is noted.    RENAL: No difficulty voiding, no pelvic pain    EXTREMITIES: Without any cyanosis, clubbing, rash, lesions or edema.    NEUROLOGIC: Awake, alert, oriented, grossly intact    SKIN: Warm, dry, good turgor.      LABS:                        10.3   12.39 )-----------( 258      ( 30 Mar 2024 08:30 )             33.1     03-30    140  |  105  |  45<H>  ----------------------------<  220<H>  3.8   |  26  |  5.15<H>    Ca    8.9      30 Mar 2024 08:30  Mg     2.9     03-30    TPro  7.9  /  Alb  3.3<L>  /  TBili  0.4  /  DBili  x   /  AST  120<H>  /  ALT  50  /  AlkPhos  101  03-30      Urinalysis Basic - ( 30 Mar 2024 08:30 )    Color: x / Appearance: x / SG: x / pH: x  Gluc: 220 mg/dL / Ketone: x  / Bili: x / Urobili: x   Blood: x / Protein: x / Nitrite: x   Leuk Esterase: x / RBC: x / WBC x   Sq Epi: x / Non Sq Epi: x / Bacteria: x        MICROBIOLOGY: (if applicable)    RADIOLOGY & ADDITIONAL STUDIES:  EKG:   CXR:< from: Xray Chest 1 View- PORTABLE-Urgent (03.30.24 @ 06:10) >    ACC: [id number] EXAM:  XR CHEST PORTABLE URGENT 1V   ORDERED BY: LORENA SPEARS     PROCEDURE DATE:  03/30/2024          INTERPRETATION:  AP chest on March 30, 2024 at 6:03 AM. Patient is short   of breath.    Heart magnified by technique. Large right jugular line again noted.    There is persistent mild to moderate CHF somewhat increased from March 24.    IMPRESSION: CHF somewhat increased from prior.    --- End of Report ---            MARIXA SANTA MD; Attending Radiologist  This document has been electronically signed. Mar 30 2024 12:52PM    < end of copied text >    ECHO:    IMPRESSION: 53y Female PAST MEDICAL & SURGICAL HISTORY:  Sarcoidosis      Diabetes      Hypertension      Hyperlipidemia      Chronic kidney disease (CKD), stage III (moderate)      Seizure disorder      No significant past surgical history       p/w       IMP: This is a 53 year old woman  from home ambulates independently, with  HTN, DM2, ESRD on HD Tue/Thur/Sat (last session 3/28 )sarcoidosis on prednisone, anemia, seizure disorder (no longer on meds) presenting to ED for shortness of breath that started early last night  admitted for Acute hypoxic resp failure  secondary to pulmonary edema due to fluid overload in the setting of ESRD requiring urgent hemodialysis and hypertensive emergency       Sugg    - Continue o2 supp   - Hemodynamic monitoring   - BP control   - Dialysis as per neph   - Monitor blood glucose with coverage   - Pat is non compliant with  meds and fluid intake ,advise compliance   - Continue prednisone     discussed with icu team   time spent 37   
[facility] - JOSE Chandra / JOSE Stewart / LARA Garcia/ JOSE Lewis/ JOSE Gale/ BHARATI Das / DAWSON Estrada / JOSE RAFAEL Hirsch / MICHEAL Up  -------------------------------------------------------------------------------------------------------  The patient seen and examined today.  HPI:  53 year old female from home ambulates independently, with PMH of HTN, DM2, ESRD on HD Tue/Thur/Sat (last session 3/28 )sarcoidosis on prednisone, anemia, seizure disorder (no longer on meds) presenting to ED for shortness of breath that started early last night. Patient was last on HD on Thursday She reports taking her medication and adhering to renal restriction diet. Patient presented to ED last week for similar symptoms and was admitted for emergency HD. At that time, she was found to be Influenza A positive and was started on Tamiflu and given two doses while in the hospital. She was discharged on 3/27. Patient denies any fevers, chills, chest pain, cough, abd pain, n/v, diarrhea, constipation, hematuria, dysuria.    In ED VS T 98.6, , / 108, RR 22, Spo2 77%RA  WBC 12K, K 6.2, Cr 4.96  S/p 2g Ca gluconate, 40 IV lasix, 5 Insulin, .4 Nitroglycerin, 5 Lokelma       PAST MEDICAL & SURGICAL HISTORY:  Sarcoidosis      Diabetes      Hypertension      Hyperlipidemia      Chronic kidney disease (CKD), stage III (moderate)      Seizure disorder      No significant past surgical history        Allergies :- No Known Allergies    Home Medications Reviewed  Hospital Medications:   MEDICATIONS  (STANDING):  atorvastatin 10 milliGRAM(s) Oral at bedtime  dextrose 5%. 1000 milliLiter(s) (50 mL/Hr) IV Continuous <Continuous>  dextrose 5%. 1000 milliLiter(s) (100 mL/Hr) IV Continuous <Continuous>  dextrose 50% Injectable 12.5 Gram(s) IV Push once  dextrose 50% Injectable 25 Gram(s) IV Push once  dextrose 50% Injectable 25 Gram(s) IV Push once  ferrous    sulfate 325 milliGRAM(s) Oral daily  gabapentin 300 milliGRAM(s) Oral two times a day  glucagon  Injectable 1 milliGRAM(s) IntraMuscular once  heparin   Injectable 5000 Unit(s) SubCutaneous every 12 hours  hydrALAZINE 50 milliGRAM(s) Oral three times a day  influenza   Vaccine 0.5 milliLiter(s) IntraMuscular once  insulin lispro (ADMELOG) corrective regimen sliding scale   SubCutaneous at bedtime  insulin lispro (ADMELOG) corrective regimen sliding scale   SubCutaneous three times a day before meals  metoprolol tartrate 12.5 milliGRAM(s) Oral two times a day  NIFEdipine XL 90 milliGRAM(s) Oral daily  predniSONE   Tablet 10 milliGRAM(s) Oral daily  sevelamer carbonate 1600 milliGRAM(s) Oral three times a day    SOCIAL HISTORY:  Denies ETOh,Smoking,   FAMILY HISTORY:  Family history of diabetes mellitus in first degree relative        REVIEW OF SYSTEMS:  CONSTITUTIONAL: No weakness, fevers or chills  EYES/ENT: No visual changes;  No vertigo or throat pain   NECK: No pain or stiffness  RESPIRATORY: SOB +  CARDIOVASCULAR: ENGLISH +  GASTROINTESTINAL: No abdominal or epigastric pain. No nausea, vomiting, or hematemesis; No diarrhea or constipation. No melena or hematochezia.  GENITOURINARY: No dysuria, frequency, foamy urine, urinary urgency, incontinence or hematuria  NEUROLOGICAL: No numbness or weakness  SKIN: No itching, burning, rashes, or lesions   VASCULAR: No bilateral lower extremity edema.   All other review of systems is negative unless indicated above.    VITALS:  T(F): 99.1 (03-30-24 @ 12:00), Max: 99.1 (03-30-24 @ 12:00)  HR: 97 (03-30-24 @ 15:00)  BP: 154/84 (03-30-24 @ 14:45)  RR: 22 (03-30-24 @ 15:00)  SpO2: 96% (03-30-24 @ 15:00)  Wt(kg): --    03-30 @ 07:01  -  03-30 @ 15:05  --------------------------------------------------------  IN: 1040 mL / OUT: 3600 mL / NET: -2560 mL      Height (cm): 152.4 (03-30 @ 05:38)  Weight (kg): 44.9 (03-30 @ 12:00)  BMI (kg/m2): 19.3 (03-30 @ 12:00)  BSA (m2): 1.38 (03-30 @ 12:00)    PHYSICAL EXAM:  Constitutional: NAD  HEENT: anicteric sclera, oropharynx clear, MMM  Neck: supple.   Respiratory: crackles B/L bases  Cardiovascular: S1, S2, Regular, Murmur present.  Gastrointestinal: Bowel Sound present, soft, NT/ND  Extremities: No cyanosis or clubbing. No peripheral edema  Neurological: Alert and oriented x 3, no focal deficits  Psychiatric: Normal mood, normal affect  : No CVA tenderness. No rai.   Skin: No rashes    Data:  03-30    140  |  105  |  45<H>  ----------------------------<  220<H>  3.8   |  26  |  5.15<H>    Ca    8.9      30 Mar 2024 08:30  Mg     2.9     03-30    TPro  7.9  /  Alb  3.3<L>  /  TBili  0.4  /  DBili      /  AST  120<H>  /  ALT  50  /  AlkPhos  101  03-30    Creatinine Trend: 5.15 <--, 4.96 <--, 3.57 <--, 5.56 <--, 3.53 <--, 2.62 <--, 4.73 <--                        10.3   12.39 )-----------( 258      ( 30 Mar 2024 08:30 )             33.1     Urine Studies:  Urinalysis Basic - ( 30 Mar 2024 08:30 )    Color:  / Appearance:  / SG:  / pH:   Gluc: 220 mg/dL / Ketone:   / Bili:  / Urobili:    Blood:  / Protein:  / Nitrite:    Leuk Esterase:  / RBC:  / WBC    Sq Epi:  / Non Sq Epi:  / Bacteria:

## 2024-03-31 LAB
ALBUMIN SERPL ELPH-MCNC: 3 G/DL — LOW (ref 3.5–5)
ALP SERPL-CCNC: 84 U/L — SIGNIFICANT CHANGE UP (ref 40–120)
ALT FLD-CCNC: 32 U/L DA — SIGNIFICANT CHANGE UP (ref 10–60)
ANION GAP SERPL CALC-SCNC: 6 MMOL/L — SIGNIFICANT CHANGE UP (ref 5–17)
AST SERPL-CCNC: 25 U/L — SIGNIFICANT CHANGE UP (ref 10–40)
BILIRUB SERPL-MCNC: 0.4 MG/DL — SIGNIFICANT CHANGE UP (ref 0.2–1.2)
BUN SERPL-MCNC: 38 MG/DL — HIGH (ref 7–18)
CALCIUM SERPL-MCNC: 9 MG/DL — SIGNIFICANT CHANGE UP (ref 8.4–10.5)
CHLORIDE SERPL-SCNC: 101 MMOL/L — SIGNIFICANT CHANGE UP (ref 96–108)
CO2 SERPL-SCNC: 27 MMOL/L — SIGNIFICANT CHANGE UP (ref 22–31)
CREAT SERPL-MCNC: 4.17 MG/DL — HIGH (ref 0.5–1.3)
EGFR: 12 ML/MIN/1.73M2 — LOW
GLUCOSE BLDC GLUCOMTR-MCNC: 166 MG/DL — HIGH (ref 70–99)
GLUCOSE BLDC GLUCOMTR-MCNC: 210 MG/DL — HIGH (ref 70–99)
GLUCOSE BLDC GLUCOMTR-MCNC: 301 MG/DL — HIGH (ref 70–99)
GLUCOSE BLDC GLUCOMTR-MCNC: 314 MG/DL — HIGH (ref 70–99)
GLUCOSE SERPL-MCNC: 210 MG/DL — HIGH (ref 70–99)
HCT VFR BLD CALC: 33.4 % — LOW (ref 34.5–45)
HGB BLD-MCNC: 10.8 G/DL — LOW (ref 11.5–15.5)
MAGNESIUM SERPL-MCNC: 2.6 MG/DL — SIGNIFICANT CHANGE UP (ref 1.6–2.6)
MCHC RBC-ENTMCNC: 29.8 PG — SIGNIFICANT CHANGE UP (ref 27–34)
MCHC RBC-ENTMCNC: 32.3 GM/DL — SIGNIFICANT CHANGE UP (ref 32–36)
MCV RBC AUTO: 92.3 FL — SIGNIFICANT CHANGE UP (ref 80–100)
MRSA PCR RESULT.: DETECTED
NRBC # BLD: 0 /100 WBCS — SIGNIFICANT CHANGE UP (ref 0–0)
PHOSPHATE SERPL-MCNC: 4.2 MG/DL — SIGNIFICANT CHANGE UP (ref 2.5–4.5)
PLATELET # BLD AUTO: 229 K/UL — SIGNIFICANT CHANGE UP (ref 150–400)
POTASSIUM SERPL-MCNC: 4.7 MMOL/L — SIGNIFICANT CHANGE UP (ref 3.5–5.3)
POTASSIUM SERPL-SCNC: 4.7 MMOL/L — SIGNIFICANT CHANGE UP (ref 3.5–5.3)
PROT SERPL-MCNC: 6.8 G/DL — SIGNIFICANT CHANGE UP (ref 6–8.3)
RBC # BLD: 3.62 M/UL — LOW (ref 3.8–5.2)
RBC # FLD: 15.5 % — HIGH (ref 10.3–14.5)
S AUREUS DNA NOSE QL NAA+PROBE: DETECTED
SODIUM SERPL-SCNC: 134 MMOL/L — LOW (ref 135–145)
WBC # BLD: 8.61 K/UL — SIGNIFICANT CHANGE UP (ref 3.8–10.5)
WBC # FLD AUTO: 8.61 K/UL — SIGNIFICANT CHANGE UP (ref 3.8–10.5)

## 2024-03-31 RX ORDER — INSULIN GLARGINE 100 [IU]/ML
5 INJECTION, SOLUTION SUBCUTANEOUS AT BEDTIME
Refills: 0 | Status: DISCONTINUED | OUTPATIENT
Start: 2024-03-31 | End: 2024-04-01

## 2024-03-31 RX ORDER — NIFEDIPINE 30 MG
90 TABLET, EXTENDED RELEASE 24 HR ORAL DAILY
Refills: 0 | Status: DISCONTINUED | OUTPATIENT
Start: 2024-03-31 | End: 2024-04-01

## 2024-03-31 RX ORDER — METOPROLOL TARTRATE 50 MG
12.5 TABLET ORAL
Refills: 0 | Status: DISCONTINUED | OUTPATIENT
Start: 2024-03-31 | End: 2024-04-01

## 2024-03-31 RX ORDER — HYDRALAZINE HCL 50 MG
50 TABLET ORAL THREE TIMES A DAY
Refills: 0 | Status: DISCONTINUED | OUTPATIENT
Start: 2024-03-31 | End: 2024-04-01

## 2024-03-31 RX ORDER — HUMAN INSULIN 100 [IU]/ML
5 INJECTION, SUSPENSION SUBCUTANEOUS ONCE
Refills: 0 | Status: COMPLETED | OUTPATIENT
Start: 2024-03-31 | End: 2024-03-31

## 2024-03-31 RX ADMIN — Medication 1: at 07:42

## 2024-03-31 RX ADMIN — Medication 50 MILLIGRAM(S): at 22:13

## 2024-03-31 RX ADMIN — Medication 12.5 MILLIGRAM(S): at 17:17

## 2024-03-31 RX ADMIN — SEVELAMER CARBONATE 800 MILLIGRAM(S): 2400 POWDER, FOR SUSPENSION ORAL at 22:13

## 2024-03-31 RX ADMIN — Medication 325 MILLIGRAM(S): at 11:14

## 2024-03-31 RX ADMIN — SEVELAMER CARBONATE 800 MILLIGRAM(S): 2400 POWDER, FOR SUSPENSION ORAL at 14:24

## 2024-03-31 RX ADMIN — Medication 10 MILLIGRAM(S): at 06:30

## 2024-03-31 RX ADMIN — HEPARIN SODIUM 5000 UNIT(S): 5000 INJECTION INTRAVENOUS; SUBCUTANEOUS at 06:30

## 2024-03-31 RX ADMIN — Medication 12.5 MILLIGRAM(S): at 06:33

## 2024-03-31 RX ADMIN — HEPARIN SODIUM 5000 UNIT(S): 5000 INJECTION INTRAVENOUS; SUBCUTANEOUS at 17:18

## 2024-03-31 RX ADMIN — Medication 80 MILLIGRAM(S): at 06:33

## 2024-03-31 RX ADMIN — INSULIN GLARGINE 5 UNIT(S): 100 INJECTION, SOLUTION SUBCUTANEOUS at 22:14

## 2024-03-31 RX ADMIN — SEVELAMER CARBONATE 800 MILLIGRAM(S): 2400 POWDER, FOR SUSPENSION ORAL at 06:30

## 2024-03-31 RX ADMIN — GABAPENTIN 300 MILLIGRAM(S): 400 CAPSULE ORAL at 06:30

## 2024-03-31 RX ADMIN — Medication 4: at 16:29

## 2024-03-31 RX ADMIN — Medication 50 MILLIGRAM(S): at 14:24

## 2024-03-31 RX ADMIN — Medication 50 MILLIGRAM(S): at 06:30

## 2024-03-31 RX ADMIN — GABAPENTIN 300 MILLIGRAM(S): 400 CAPSULE ORAL at 17:17

## 2024-03-31 RX ADMIN — Medication 4: at 11:15

## 2024-03-31 RX ADMIN — Medication 90 MILLIGRAM(S): at 06:30

## 2024-03-31 RX ADMIN — HUMAN INSULIN 5 UNIT(S): 100 INJECTION, SUSPENSION SUBCUTANEOUS at 07:42

## 2024-03-31 RX ADMIN — ATORVASTATIN CALCIUM 10 MILLIGRAM(S): 80 TABLET, FILM COATED ORAL at 22:20

## 2024-03-31 NOTE — PROGRESS NOTE ADULT - ASSESSMENT
53 year old female from home ambulates independently, with PMH of HTN, DM2, ESRD on HD Tue/Thur/Sat (last session 3/28 )sarcoidosis on prednisone, anemia, seizure disorder (no longer on meds) presenting to ED for shortness of breath that started early last night.    ESRD on HD  Schedule TTS  access; Permacath  HD center: [facility]  Consent signed  patient poorly compliant with fluid restriction  plan:  scheduled for UF session tomorrow  Hyperkalemia resolved  renal diet with fluid restriction 1.2L QD  BMP QD    HTN  due to vol overload  UF on HD      For any question, call:  Cell # [phone]  Pager # [phone]  Callback # [phone]

## 2024-03-31 NOTE — CHART NOTE - NSCHARTNOTEFT_GEN_A_CORE
53 year old female from home ambulates independently, with PMH of HTN, DM2, ESRD on HD Tue/Thur/Sat (last session 3/28 )sarcoidosis on prednisone, anemia, seizure disorder (no longer on meds) presenting to ED for shortness of breath that started early last night  admitted for AHRF secondary to pulmonary edema due to fluid overload in the setting of ESRD requiring urgent hemodialysis. Admitted to ICU for hypertensive emergency requiring nirtoglycerin drip and emergent HD.     In ICU pt had her HD sesssion and was weaned from nitro drip onto her home antihypertensives.  She is on a 1.2L fluid restriction and requires strict adherence to this restriction.      She is stable for transfer and was signed out to [     ] & [    ].    For medicine team follow-up:  [ ] SW for resumption of out-patient HD services 53 year old female from home ambulates independently, with PMH of HTN, DM2, ESRD on HD Tue/Thur/Sat (last session 3/28 )sarcoidosis on prednisone, anemia, seizure disorder (no longer on meds) presenting to ED for shortness of breath that started early last night  admitted for AHRF secondary to pulmonary edema due to fluid overload in the setting of ESRD requiring urgent hemodialysis. Admitted to ICU for hypertensive emergency requiring nitroglycerin drip and emergent HD.       In ICU pt had her HD session and was weaned from nitro drip back onto her home antihypertensives.  She is on a 1.2L fluid restriction and requires strict adherence to this restriction.      She is stable for transfer and was signed out to Dr Bedoya & Dr Morin PGY-3.    For medicine team follow-up:  [ ] SW for resumption of out-patient HD services  [ ] monitor glucose  [ ] monitor BP and fluid intake for compliance

## 2024-03-31 NOTE — PROGRESS NOTE ADULT - ASSESSMENT
ASSESSMENT  53 year old female from home ambulates independently, with PMH of HTN, DM2, ESRD on HD Tue/Thur/Sat (last session 3/28 )sarcoidosis on prednisone, anemia, seizure disorder (no longer on meds) presenting to ED for shortness of breath that started early last night  admitted for AHRF secondary to pulmonary edema due to fluid overload in the setting of ESRD requiring urgent hemodialysis         _________CNS___________  #No active issues    #Seizure Disorder  -not on any meds   _________CVS___________  #Hypertensive Emergency  -started on Nitroglycerin drip in ED. MAP should be reduced gradually by approximately 25% in the next 24 hours. Target blood pressure of <180/<120 mmHg for the first hour and <160/<110 mmHg for the next 23 hours.  -hold home BP meds   -EKG Sinus tachycardia     _________RESP__________  #AHRF  -On Non rebreather saturating 100%  -Emergency HD Dr. Estrada consulted   -Patient in HD now    #Sarcoidosis  -c/w 10mg prednisone   ___________GI____________  #No active issues    ________ RENAL__________  #ESRD MWF  -last HD was on Thursday 3/28   -Dr. Estrada consulted, at emergency HD now   -Pt states she no longer makes any urine    #Hyperkalemia  -s/p 1g Ca, 40  lasix, 5 Insulin, .4 Nitro, and 5 Lokelma   -HD   -f/u post HD labs     __________MSK___________  #No active issues     ___________ID____________  #Influenza A  -tested positive on prior admission 3/24 and again today 3/20. patient received 3 total doses of Tamiflu with HD, now completed.   #DM  -SSI     ______HEME/ONC_______  #Leukocytosis  likely reactive vs positive Influenza A   _________SKIN____________  #No active issues     ________PROPHY_______  #DVT heparin sub q  #GI PPI      ______GOC/DISPO___________  ICU      ASSESSMENT  53 year old female from home ambulates independently, with PMH of HTN, DM2, ESRD on HD Tue/Thur/Sat (last session 3/28 )sarcoidosis on prednisone, anemia, seizure disorder (no longer on meds) presenting to ED for shortness of breath that started early last night  admitted for AHRF secondary to pulmonary edema due to fluid overload in the setting of ESRD requiring urgent hemodialysis     _________CNS___________  #No active issues  - A7Ox4, at baseline mental status    #Seizure Disorder  -not on any meds     _________CVS___________  #Hypertensive Emergency  -s/p Nitroglycerin drip in ED, BP reduction of 25% maintained x 24 hrs  -EKG Sinus tachycardia   - restarted home BP meds       - nifedipine XL 90mg PO daily       - hydralazine 50mg PO TID       - metoprolol 12.5mg PO BID    _________RESP__________  #AHRF in the setting of volume overload 2/2 ESRD  - status post emergent HD  - 1.2L fluid restriction   - Dr. Estrada consulted     #Sarcoidosis  -continue prednisone 10mg PO daily    ___________GI____________  #No active issues  #Nutrition  - tolerating PO diet with fluid restriction as above,     ________ RENAL__________  #ESRD MWF  - patient with recurrent volume overload in between scheduled sessions in the setting of uncertain fluid restriction compliance  -Pt states she no longer makes any urine  -last HD was on Thursday 3/28   -Dr. Estrada following      #Hyperkalemia  -s/p 1g Ca, 40  lasix, 5 Insulin, .4 Nitro, and 5 Lokelma   - RESOLVED    __________MSK___________  #No active issues     ___________ID____________  #Influenza A  -tested positive on prior admission 3/24 and again today 3/20. patient received 3 total doses of Tamiflu with HD, now completed.    _______ENDO________  #DM  - FS ACHS  - continue lantus 5 units qHS  - continue ISS     ______HEME/ONC_______  #Leukocytosis  -likely reactive vs positive Influenza A     _________SKIN____________  #No active issues     ________PROPHY_______  #DVT heparin sub q  #GI PPI      ______GOC/DISPO___________  Downgrade to medicine

## 2024-03-31 NOTE — PROGRESS NOTE ADULT - SUBJECTIVE AND OBJECTIVE BOX
[facility] - JOSE Chandra / JOSE Stewart / LARA Garcia/ JOSE Lewis/ JOSE Gale/ BHARATI Das / DAWSON Estrada / JOSE RAFAEL Hirsch / MICHEAL Up  ---------------------------------------------------------------------------------------------------------------  seen and examined today for ESRD  Interval : NAD  VITALS:  T(F): 98.1 (03-31-24 @ 03:00), Max: 100.7 (03-30-24 @ 15:30)  HR: 87 (03-31-24 @ 11:00)  BP: 130/78 (03-31-24 @ 11:00)  RR: 15 (03-31-24 @ 11:00)  SpO2: 100% (03-31-24 @ 11:00)  Wt(kg): --    03-30 @ 07:01  -  03-31 @ 07:00  --------------------------------------------------------  IN: 1540 mL / OUT: 3601 mL / NET: -2061 mL    03-31 @ 07:01  -  03-31 @ 11:58  --------------------------------------------------------  IN: 240 mL / OUT: 1 mL / NET: 239 mL      Physical Exam :-  Constitutional: NAD  Neck: Supple.  Respiratory: Bilateral equal breath sounds,  Cardiovascular: S1, S2 normal,  Gastrointestinal: Bowel Sounds present, soft, non tender.  Extremities: No edema  Neurological: Alert and Oriented x 3, no focal deficits  Psychiatric: Normal mood, normal affect  Data:-  Allergies :   No Known Allergies    Hospital Medications:   MEDICATIONS  (STANDING):  atorvastatin 10 milliGRAM(s) Oral at bedtime  dextrose 50% Injectable 25 Gram(s) IV Push once  ferrous    sulfate 325 milliGRAM(s) Oral daily  furosemide    Tablet 80 milliGRAM(s) Oral <User Schedule>  gabapentin 300 milliGRAM(s) Oral two times a day  heparin   Injectable 5000 Unit(s) SubCutaneous every 12 hours  hydrALAZINE 50 milliGRAM(s) Oral three times a day  influenza   Vaccine 0.5 milliLiter(s) IntraMuscular once  insulin glargine Injectable (LANTUS) 5 Unit(s) SubCutaneous at bedtime  insulin lispro (ADMELOG) corrective regimen sliding scale   SubCutaneous at bedtime  insulin lispro (ADMELOG) corrective regimen sliding scale   SubCutaneous three times a day before meals  metoprolol tartrate 12.5 milliGRAM(s) Oral two times a day  NIFEdipine XL 90 milliGRAM(s) Oral daily  polyethylene glycol 3350 17 Gram(s) Oral daily  predniSONE   Tablet 10 milliGRAM(s) Oral daily  senna 2 Tablet(s) Oral at bedtime  sevelamer carbonate 800 milliGRAM(s) Oral every 8 hours    03-31    134<L>  |  101  |  38<H>  ----------------------------<  210<H>  4.7   |  27  |  4.17<H>    Ca    9.0      31 Mar 2024 03:47  Phos  4.2     03-31  Mg     2.6     03-31    TPro  6.8  /  Alb  3.0<L>  /  TBili  0.4  /  DBili      /  AST  25  /  ALT  32  /  AlkPhos  84  03-31    Creatinine Trend: 4.17 <--, 5.15 <--, 4.96 <--, 3.57 <--, 5.56 <--, 3.53 <--, 2.62 <--                        10.8   8.61  )-----------( 229      ( 31 Mar 2024 03:47 )             33.4

## 2024-03-31 NOTE — PROGRESS NOTE ADULT - SUBJECTIVE AND OBJECTIVE BOX
INTERVAL HPI/OVERNIGHT EVENTS:     PRESSORS: [ ] YES [ ] NO  WHICH:    ANTIBIOTICS:                  DATE STARTED:  ANTIBIOTICS:                  DATE STARTED:  ANTIBIOTICS:                  DATE STARTED:    Antimicrobial:    Cardiovascular:  furosemide    Tablet 80 milliGRAM(s) Oral <User Schedule>  hydrALAZINE 50 milliGRAM(s) Oral three times a day  metoprolol tartrate 12.5 milliGRAM(s) Oral two times a day  NIFEdipine XL 90 milliGRAM(s) Oral daily    Pulmonary:    Hematalogic:  heparin   Injectable 5000 Unit(s) SubCutaneous every 12 hours    Other:  acetaminophen     Tablet .. 650 milliGRAM(s) Oral every 6 hours PRN  atorvastatin 10 milliGRAM(s) Oral at bedtime  dextrose 5%. 1000 milliLiter(s) IV Continuous <Continuous>  dextrose 5%. 1000 milliLiter(s) IV Continuous <Continuous>  dextrose 50% Injectable 25 Gram(s) IV Push once  dextrose 50% Injectable 25 Gram(s) IV Push once  dextrose 50% Injectable 12.5 Gram(s) IV Push once  dextrose Oral Gel 15 Gram(s) Oral once PRN  ferrous    sulfate 325 milliGRAM(s) Oral daily  gabapentin 300 milliGRAM(s) Oral two times a day  glucagon  Injectable 1 milliGRAM(s) IntraMuscular once  influenza   Vaccine 0.5 milliLiter(s) IntraMuscular once  insulin lispro (ADMELOG) corrective regimen sliding scale   SubCutaneous at bedtime  insulin lispro (ADMELOG) corrective regimen sliding scale   SubCutaneous three times a day before meals  polyethylene glycol 3350 17 Gram(s) Oral daily  predniSONE   Tablet 10 milliGRAM(s) Oral daily  senna 2 Tablet(s) Oral at bedtime  sevelamer carbonate 800 milliGRAM(s) Oral every 8 hours      Drug Dosing Weight  Height (cm): 152.4 (30 Mar 2024 05:38)  Weight (kg): 44.9 (30 Mar 2024 12:00)  BMI (kg/m2): 19.3 (30 Mar 2024 12:00)  BSA (m2): 1.38 (30 Mar 2024 12:00)    CENTRAL LINE: [ ] YES [ ] NO  LOCATION:   DATE INSERTED:  REMOVE: [ ] YES [ ] NO  EXPLAIN:    MCINTOSH: [ ] YES [ ] NO    DATE INSERTED:  REMOVE:  [ ] YES [ ] NO  EXPLAIN:    A-LINE:  [ ] YES [ ] NO  LOCATION:   DATE INSERTED:  REMOVE:  [ ] YES [ ] NO  EXPLAIN:    PMH/Social Hx/Fam Hx -reviewed admission note, no change since admission  PAST MEDICAL & SURGICAL HISTORY:  Sarcoidosis      Diabetes      Hypertension      Hyperlipidemia      Chronic kidney disease (CKD), stage III (moderate)      Seizure disorder      No significant past surgical history        Heart faliure: acute [ ] chronic [ ] acute or chronic [ ] diastolic [ ] systolic [ ] combied systolic and diastolic[ ]  MIKALA: ATN[ ] renal medullary necrosis [ ] CKD I [ ]CKDII [ ]CKD III [ ]CKD IV [ ]CKD V [ ]Other pathological lesions [ ]  Abdominal Nutrition Status: malnutrition [ ] cachexia [ ] morbid obesity/BMI=40 [ ] Supplement ordered [___________]     T(C): 37.3 (03-30-24 @ 21:00), Max: 38.2 (03-30-24 @ 15:30)  HR: 87 (03-30-24 @ 23:00)  BP: 132/78 (03-30-24 @ 23:00)  BP(mean): 95 (03-30-24 @ 23:00)  ABP: --  ABP(mean): --  RR: 20 (03-30-24 @ 23:00)  SpO2: 97% (03-30-24 @ 23:00)  Wt(kg): --                  PHYSICAL EXAM:    GENERAL: [ ]NAD, [ ]well-groomed, [ ]well-developed  HEAD:  [ ]Atraumatic, [ ]Normocephalic  EYES: [ ]EOMI, [ ]PERRLA, [ ]conjunctiva and sclera clear  ENMT: [ ]No tonsillar erythema, exudates, or enlargement; [ ]Moist mucous membranes, [ ]Good dentition, [ ]No lesions  NECK: [ ]Supple, normal appearance, [ ]No JVD; [ ]Normal thyroid; [ ]Trachea midline  NERVOUS SYSTEM:  [ ]Alert & Oriented X3, [ ]Good concentration; [ ]Motor Strength 5/5 B/L upper and lower extremities; [ ]DTRs 2+ intact and symmetric  CHEST/LUNG: [ ]No chest deformity; [ ]Normal percussion bilaterally; [ ]No rales, rhonchi, wheezing; [ ]Crackles at bases  HEART: [ ]Regular rate and rhythm; [ ]No murmurs, rubs, or gallops  ABDOMEN: [ ]Soft, Nontender, Nondistended; [ ]Bowel sounds present  EXTREMITIES:  [ ]2+ Peripheral Pulses, [ ]No clubbing, cyanosis, or edema [ ]Bilat lower extremity edema  LYMPH: [ ]No lymphadenopathy noted  SKIN: [ ]No rashes or lesions; [ ]Good capillary refill      LABS:  CBC Full  -  ( 30 Mar 2024 08:30 )  WBC Count : 12.39 K/uL  RBC Count : 3.41 M/uL  Hemoglobin : 10.3 g/dL  Hematocrit : 33.1 %  Platelet Count - Automated : 258 K/uL  Mean Cell Volume : 97.1 fl  Mean Cell Hemoglobin : 30.2 pg  Mean Cell Hemoglobin Concentration : 31.1 gm/dL  Auto Neutrophil # : 9.27 K/uL  Auto Lymphocyte # : 1.57 K/uL  Auto Monocyte # : 0.83 K/uL  Auto Eosinophil # : 0.55 K/uL  Auto Basophil # : 0.05 K/uL  Auto Neutrophil % : 74.8 %  Auto Lymphocyte % : 12.7 %  Auto Monocyte % : 6.7 %  Auto Eosinophil % : 4.4 %  Auto Basophil % : 0.4 %    03-30    140  |  105  |  45<H>  ----------------------------<  220<H>  3.8   |  26  |  5.15<H>    Ca    8.9      30 Mar 2024 08:30  Mg     2.9     03-30    TPro  7.9  /  Alb  3.3<L>  /  TBili  0.4  /  DBili  x   /  AST  120<H>  /  ALT  50  /  AlkPhos  101  03-30      Urinalysis Basic - ( 30 Mar 2024 08:30 )    Color: x / Appearance: x / SG: x / pH: x  Gluc: 220 mg/dL / Ketone: x  / Bili: x / Urobili: x   Blood: x / Protein: x / Nitrite: x   Leuk Esterase: x / RBC: x / WBC x   Sq Epi: x / Non Sq Epi: x / Bacteria: x          RADIOLOGY & ADDITIONAL STUDIES REVIEWED:      [ ]GOALS OF CARE DISCUSSION WITH PATIENT/FAMILY/PROXY:    CRITICAL CARE TIME SPENT: 35 minutes INTERVAL HPI/OVERNIGHT EVENTS:     PRESSORS: [ ] YES [ ] NO  WHICH:    ANTIBIOTICS:                  DATE STARTED:  ANTIBIOTICS:                  DATE STARTED:  ANTIBIOTICS:                  DATE STARTED:    Antimicrobial:    Cardiovascular:  furosemide    Tablet 80 milliGRAM(s) Oral <User Schedule>  hydrALAZINE 50 milliGRAM(s) Oral three times a day  metoprolol tartrate 12.5 milliGRAM(s) Oral two times a day  NIFEdipine XL 90 milliGRAM(s) Oral daily    Pulmonary:    Hematalogic:  heparin   Injectable 5000 Unit(s) SubCutaneous every 12 hours    Other:  acetaminophen     Tablet .. 650 milliGRAM(s) Oral every 6 hours PRN  atorvastatin 10 milliGRAM(s) Oral at bedtime  dextrose 5%. 1000 milliLiter(s) IV Continuous <Continuous>  dextrose 5%. 1000 milliLiter(s) IV Continuous <Continuous>  dextrose 50% Injectable 25 Gram(s) IV Push once  dextrose 50% Injectable 25 Gram(s) IV Push once  dextrose 50% Injectable 12.5 Gram(s) IV Push once  dextrose Oral Gel 15 Gram(s) Oral once PRN  ferrous    sulfate 325 milliGRAM(s) Oral daily  gabapentin 300 milliGRAM(s) Oral two times a day  glucagon  Injectable 1 milliGRAM(s) IntraMuscular once  influenza   Vaccine 0.5 milliLiter(s) IntraMuscular once  insulin lispro (ADMELOG) corrective regimen sliding scale   SubCutaneous at bedtime  insulin lispro (ADMELOG) corrective regimen sliding scale   SubCutaneous three times a day before meals  polyethylene glycol 3350 17 Gram(s) Oral daily  predniSONE   Tablet 10 milliGRAM(s) Oral daily  senna 2 Tablet(s) Oral at bedtime  sevelamer carbonate 800 milliGRAM(s) Oral every 8 hours      Drug Dosing Weight  Height (cm): 152.4 (30 Mar 2024 05:38)  Weight (kg): 44.9 (30 Mar 2024 12:00)  BMI (kg/m2): 19.3 (30 Mar 2024 12:00)  BSA (m2): 1.38 (30 Mar 2024 12:00)    CENTRAL LINE: [ ] YES [ ] NO  LOCATION:   DATE INSERTED:  REMOVE: [ ] YES [ ] NO  EXPLAIN:    MCINTOSH: [ ] YES [ ] NO    DATE INSERTED:  REMOVE:  [ ] YES [ ] NO  EXPLAIN:    A-LINE:  [ ] YES [ ] NO  LOCATION:   DATE INSERTED:  REMOVE:  [ ] YES [ ] NO  EXPLAIN:    PMH/Social Hx/Fam Hx -reviewed admission note, no change since admission  PAST MEDICAL & SURGICAL HISTORY:  Sarcoidosis      Diabetes      Hypertension      Hyperlipidemia      Chronic kidney disease (CKD), stage III (moderate)      Seizure disorder      No significant past surgical history        Heart faliure: acute [ ] chronic [ ] acute or chronic [ ] diastolic [ ] systolic [ ] combied systolic and diastolic[ ]  MIKALA: ATN[ ] renal medullary necrosis [ ] CKD I [ ]CKDII [ ]CKD III [ ]CKD IV [ ]CKD V [ ]Other pathological lesions [ ]  Abdominal Nutrition Status: malnutrition [ ] cachexia [ ] morbid obesity/BMI=40 [ ] Supplement ordered [___________]     T(C): 37.3 (03-30-24 @ 21:00), Max: 38.2 (03-30-24 @ 15:30)  HR: 87 (03-30-24 @ 23:00)  BP: 132/78 (03-30-24 @ 23:00)  BP(mean): 95 (03-30-24 @ 23:00)  ABP: --  ABP(mean): --  RR: 20 (03-30-24 @ 23:00)  SpO2: 97% (03-30-24 @ 23:00)  Wt(kg): --                  PHYSICAL EXAM:  GENERAL: well-appearing female seated in recliner in no apparent distress  HEAD:  Atraumatic, Normocephalic  NECK: Supple, normal appearance, trachea midline, no JVD  NERVOUS SYSTEM: A&Ox4, moving all extremities with equal strength bilaterally  CHEST/LUNG: Lungs clear to auscultation bilaterally, fine rales at bases, no rhonchi or wheezing   HEART: Regular rate and rhythm; No murmurs, rubs, or gallops  ABDOMEN: Soft, Nontender, Nondistended; Bowel sounds present  EXTREMITIES:  2+ Peripheral Pulses, No clubbing, cyanosis, or edema  SKIN: No rashes or lesions;  brisk capillary refill       LABS:  CBC Full  -  ( 30 Mar 2024 08:30 )  WBC Count : 12.39 K/uL  RBC Count : 3.41 M/uL  Hemoglobin : 10.3 g/dL  Hematocrit : 33.1 %  Platelet Count - Automated : 258 K/uL  Mean Cell Volume : 97.1 fl  Mean Cell Hemoglobin : 30.2 pg  Mean Cell Hemoglobin Concentration : 31.1 gm/dL  Auto Neutrophil # : 9.27 K/uL  Auto Lymphocyte # : 1.57 K/uL  Auto Monocyte # : 0.83 K/uL  Auto Eosinophil # : 0.55 K/uL  Auto Basophil # : 0.05 K/uL  Auto Neutrophil % : 74.8 %  Auto Lymphocyte % : 12.7 %  Auto Monocyte % : 6.7 %  Auto Eosinophil % : 4.4 %  Auto Basophil % : 0.4 %    03-30    140  |  105  |  45<H>  ----------------------------<  220<H>  3.8   |  26  |  5.15<H>    Ca    8.9      30 Mar 2024 08:30  Mg     2.9     03-30    TPro  7.9  /  Alb  3.3<L>  /  TBili  0.4  /  DBili  x   /  AST  120<H>  /  ALT  50  /  AlkPhos  101  03-30      Urinalysis Basic - ( 30 Mar 2024 08:30 )    Color: x / Appearance: x / SG: x / pH: x  Gluc: 220 mg/dL / Ketone: x  / Bili: x / Urobili: x   Blood: x / Protein: x / Nitrite: x   Leuk Esterase: x / RBC: x / WBC x   Sq Epi: x / Non Sq Epi: x / Bacteria: x          RADIOLOGY & ADDITIONAL STUDIES REVIEWED:      [ ]GOALS OF CARE DISCUSSION WITH PATIENT/FAMILY/PROXY:    CRITICAL CARE TIME SPENT: 35 minutes

## 2024-04-01 ENCOUNTER — TRANSCRIPTION ENCOUNTER (OUTPATIENT)
Age: 54
End: 2024-04-01

## 2024-04-01 VITALS
TEMPERATURE: 98 F | OXYGEN SATURATION: 100 % | DIASTOLIC BLOOD PRESSURE: 85 MMHG | SYSTOLIC BLOOD PRESSURE: 145 MMHG | HEART RATE: 79 BPM | RESPIRATION RATE: 18 BRPM

## 2024-04-01 DIAGNOSIS — J96.01 ACUTE RESPIRATORY FAILURE WITH HYPOXIA: ICD-10-CM

## 2024-04-01 DIAGNOSIS — I10 ESSENTIAL (PRIMARY) HYPERTENSION: ICD-10-CM

## 2024-04-01 DIAGNOSIS — D86.9 SARCOIDOSIS, UNSPECIFIED: ICD-10-CM

## 2024-04-01 DIAGNOSIS — Z29.9 ENCOUNTER FOR PROPHYLACTIC MEASURES, UNSPECIFIED: ICD-10-CM

## 2024-04-01 DIAGNOSIS — N18.6 END STAGE RENAL DISEASE: ICD-10-CM

## 2024-04-01 DIAGNOSIS — J81.0 ACUTE PULMONARY EDEMA: ICD-10-CM

## 2024-04-01 LAB
A1C WITH ESTIMATED AVERAGE GLUCOSE RESULT: 6.1 % — HIGH (ref 4–5.6)
ALBUMIN SERPL ELPH-MCNC: 3 G/DL — LOW (ref 3.5–5)
ALP SERPL-CCNC: 79 U/L — SIGNIFICANT CHANGE UP (ref 40–120)
ALT FLD-CCNC: 24 U/L DA — SIGNIFICANT CHANGE UP (ref 10–60)
ANION GAP SERPL CALC-SCNC: 9 MMOL/L — SIGNIFICANT CHANGE UP (ref 5–17)
AST SERPL-CCNC: 10 U/L — SIGNIFICANT CHANGE UP (ref 10–40)
BILIRUB SERPL-MCNC: 0.3 MG/DL — SIGNIFICANT CHANGE UP (ref 0.2–1.2)
BUN SERPL-MCNC: 66 MG/DL — HIGH (ref 7–18)
CALCIUM SERPL-MCNC: 8.5 MG/DL — SIGNIFICANT CHANGE UP (ref 8.4–10.5)
CHLORIDE SERPL-SCNC: 98 MMOL/L — SIGNIFICANT CHANGE UP (ref 96–108)
CO2 SERPL-SCNC: 26 MMOL/L — SIGNIFICANT CHANGE UP (ref 22–31)
CREAT SERPL-MCNC: 6.3 MG/DL — HIGH (ref 0.5–1.3)
EGFR: 7 ML/MIN/1.73M2 — LOW
ESTIMATED AVERAGE GLUCOSE: 128 MG/DL — HIGH (ref 68–114)
GLUCOSE BLDC GLUCOMTR-MCNC: 176 MG/DL — HIGH (ref 70–99)
GLUCOSE BLDC GLUCOMTR-MCNC: 191 MG/DL — HIGH (ref 70–99)
GLUCOSE BLDC GLUCOMTR-MCNC: 353 MG/DL — HIGH (ref 70–99)
GLUCOSE SERPL-MCNC: 231 MG/DL — HIGH (ref 70–99)
HCT VFR BLD CALC: 32.4 % — LOW (ref 34.5–45)
HGB BLD-MCNC: 10.3 G/DL — LOW (ref 11.5–15.5)
MAGNESIUM SERPL-MCNC: 2.9 MG/DL — HIGH (ref 1.6–2.6)
MCHC RBC-ENTMCNC: 30.3 PG — SIGNIFICANT CHANGE UP (ref 27–34)
MCHC RBC-ENTMCNC: 31.8 GM/DL — LOW (ref 32–36)
MCV RBC AUTO: 95.3 FL — SIGNIFICANT CHANGE UP (ref 80–100)
NRBC # BLD: 0 /100 WBCS — SIGNIFICANT CHANGE UP (ref 0–0)
PHOSPHATE SERPL-MCNC: 6.6 MG/DL — HIGH (ref 2.5–4.5)
PLATELET # BLD AUTO: 209 K/UL — SIGNIFICANT CHANGE UP (ref 150–400)
POTASSIUM SERPL-MCNC: 4.7 MMOL/L — SIGNIFICANT CHANGE UP (ref 3.5–5.3)
POTASSIUM SERPL-SCNC: 4.7 MMOL/L — SIGNIFICANT CHANGE UP (ref 3.5–5.3)
PROT SERPL-MCNC: 6.6 G/DL — SIGNIFICANT CHANGE UP (ref 6–8.3)
RBC # BLD: 3.4 M/UL — LOW (ref 3.8–5.2)
RBC # FLD: 15 % — HIGH (ref 10.3–14.5)
SODIUM SERPL-SCNC: 133 MMOL/L — LOW (ref 135–145)
WBC # BLD: 5.74 K/UL — SIGNIFICANT CHANGE UP (ref 3.8–10.5)
WBC # FLD AUTO: 5.74 K/UL — SIGNIFICANT CHANGE UP (ref 3.8–10.5)

## 2024-04-01 PROCEDURE — 85027 COMPLETE CBC AUTOMATED: CPT

## 2024-04-01 PROCEDURE — 82962 GLUCOSE BLOOD TEST: CPT

## 2024-04-01 PROCEDURE — 99239 HOSP IP/OBS DSCHRG MGMT >30: CPT | Mod: GC

## 2024-04-01 PROCEDURE — 80048 BASIC METABOLIC PNL TOTAL CA: CPT

## 2024-04-01 PROCEDURE — 71045 X-RAY EXAM CHEST 1 VIEW: CPT

## 2024-04-01 PROCEDURE — 87637 SARSCOV2&INF A&B&RSV AMP PRB: CPT

## 2024-04-01 PROCEDURE — 99285 EMERGENCY DEPT VISIT HI MDM: CPT

## 2024-04-01 PROCEDURE — 87640 STAPH A DNA AMP PROBE: CPT

## 2024-04-01 PROCEDURE — 99261: CPT

## 2024-04-01 PROCEDURE — 93005 ELECTROCARDIOGRAM TRACING: CPT

## 2024-04-01 PROCEDURE — 85025 COMPLETE CBC W/AUTO DIFF WBC: CPT

## 2024-04-01 PROCEDURE — 83036 HEMOGLOBIN GLYCOSYLATED A1C: CPT

## 2024-04-01 PROCEDURE — 96374 THER/PROPH/DIAG INJ IV PUSH: CPT

## 2024-04-01 PROCEDURE — 36415 COLL VENOUS BLD VENIPUNCTURE: CPT

## 2024-04-01 PROCEDURE — 83735 ASSAY OF MAGNESIUM: CPT

## 2024-04-01 PROCEDURE — 87641 MR-STAPH DNA AMP PROBE: CPT

## 2024-04-01 PROCEDURE — 84100 ASSAY OF PHOSPHORUS: CPT

## 2024-04-01 PROCEDURE — 80053 COMPREHEN METABOLIC PANEL: CPT

## 2024-04-01 PROCEDURE — 96375 TX/PRO/DX INJ NEW DRUG ADDON: CPT

## 2024-04-01 RX ORDER — MUPIROCIN 20 MG/G
1 OINTMENT TOPICAL
Refills: 0 | Status: DISCONTINUED | OUTPATIENT
Start: 2024-04-01 | End: 2024-04-01

## 2024-04-01 RX ORDER — CHLORHEXIDINE GLUCONATE 213 G/1000ML
1 SOLUTION TOPICAL
Refills: 0 | Status: DISCONTINUED | OUTPATIENT
Start: 2024-04-01 | End: 2024-04-01

## 2024-04-01 RX ADMIN — Medication 325 MILLIGRAM(S): at 12:16

## 2024-04-01 RX ADMIN — Medication 12.5 MILLIGRAM(S): at 06:11

## 2024-04-01 RX ADMIN — GABAPENTIN 300 MILLIGRAM(S): 400 CAPSULE ORAL at 05:55

## 2024-04-01 RX ADMIN — Medication 50 MILLIGRAM(S): at 05:56

## 2024-04-01 RX ADMIN — Medication 10 MILLIGRAM(S): at 05:55

## 2024-04-01 RX ADMIN — SEVELAMER CARBONATE 800 MILLIGRAM(S): 2400 POWDER, FOR SUSPENSION ORAL at 05:56

## 2024-04-01 RX ADMIN — Medication 90 MILLIGRAM(S): at 05:56

## 2024-04-01 RX ADMIN — Medication 50 MILLIGRAM(S): at 16:29

## 2024-04-01 RX ADMIN — Medication 12.5 MILLIGRAM(S): at 21:00

## 2024-04-01 RX ADMIN — GABAPENTIN 300 MILLIGRAM(S): 400 CAPSULE ORAL at 21:00

## 2024-04-01 RX ADMIN — Medication 80 MILLIGRAM(S): at 06:00

## 2024-04-01 RX ADMIN — CHLORHEXIDINE GLUCONATE 1 APPLICATION(S): 213 SOLUTION TOPICAL at 10:24

## 2024-04-01 RX ADMIN — Medication 5: at 12:16

## 2024-04-01 RX ADMIN — SEVELAMER CARBONATE 800 MILLIGRAM(S): 2400 POWDER, FOR SUSPENSION ORAL at 14:56

## 2024-04-01 RX ADMIN — Medication 1: at 08:17

## 2024-04-01 NOTE — DISCHARGE NOTE PROVIDER - ATTENDING DISCHARGE PHYSICAL EXAMINATION:
GENERAL: thin woman sitting upright in bed in no acute distress  HEAD:  Atraumatic, Normocephalic  EYES: EOMI, PERRLA  NECK:  No JVD appreciated  CHEST/LUNG: Clear to auscultation bilaterally; No wheeze  HEART: Regular rate and rhythm; No murmurs, rubs, or gallops  ABDOMEN: Soft, Nontender, Nondistended; Bowel sounds present  EXTREMITIES:  2+ Peripheral Pulses, No clubbing, cyanosis, or edema  PSYCH: AAOx3  NEUROLOGY: non-focal  SKIN: No rashes or lesions GENERAL: thin woman sitting upright in bed in no acute distress  HEAD:  Atraumatic, Normocephalic  EYES: EOMI, PERRLA  NECK:  No JVD appreciated  CHEST/LUNG: Clear to auscultation bilaterally; No wheeze; there is a right chest catheter for HD access, clean appearing, no erythema or tenderness   HEART: Regular rate and rhythm; No murmurs, rubs, or gallops  ABDOMEN: Soft, Nontender, Nondistended; Bowel sounds present  EXTREMITIES:  2+ Peripheral Pulses, No clubbing, cyanosis, or edema  PSYCH: AAOx3  NEUROLOGY: non-focal  SKIN: No rashes or lesions

## 2024-04-01 NOTE — CHART NOTE - NSCHARTNOTEFT_GEN_A_CORE
Patient was ultimately able to complete her HD session late in the afternoon/evening and requests discharge was originally planned this morning. DC order placed.

## 2024-04-01 NOTE — PROGRESS NOTE ADULT - SUBJECTIVE AND OBJECTIVE BOX
[facility] : Progress Note :: [phone], (office [phone]),   Dr Estrada / Dr Das / Dr Gale / Dr Vega / Dr Debbie FORD / Dr Garcia / Dr Chandra / Dr Jona pal  _____________________________________________________________________________________________  no more SOB.  For UF today.      No Known Allergies    Hospital Medications:   MEDICATIONS  (STANDING):  atorvastatin 10 milliGRAM(s) Oral at bedtime  chlorhexidine 2% Cloths 1 Application(s) Topical <User Schedule>  ferrous    sulfate 325 milliGRAM(s) Oral daily  furosemide    Tablet 80 milliGRAM(s) Oral <User Schedule>  gabapentin 300 milliGRAM(s) Oral two times a day  heparin   Injectable 5000 Unit(s) SubCutaneous every 12 hours  hydrALAZINE 50 milliGRAM(s) Oral three times a day  influenza   Vaccine 0.5 milliLiter(s) IntraMuscular once  insulin glargine Injectable (LANTUS) 5 Unit(s) SubCutaneous at bedtime  insulin lispro (ADMELOG) corrective regimen sliding scale   SubCutaneous three times a day before meals  insulin lispro (ADMELOG) corrective regimen sliding scale   SubCutaneous at bedtime  metoprolol tartrate 12.5 milliGRAM(s) Oral two times a day  mupirocin 2% Ointment 1 Application(s) Both Nostrils two times a day  NIFEdipine XL 90 milliGRAM(s) Oral daily  polyethylene glycol 3350 17 Gram(s) Oral daily  predniSONE   Tablet 10 milliGRAM(s) Oral daily  senna 2 Tablet(s) Oral at bedtime  sevelamer carbonate 800 milliGRAM(s) Oral every 8 hours      VITALS:  T(F): 97.7 (04-01-24 @ 17:37), Max: 98.2 (03-31-24 @ 21:53)  HR: 82 (04-01-24 @ 17:37)  BP: 144/80 (04-01-24 @ 17:37)  RR: 18 (04-01-24 @ 17:37)  SpO2: 99% (04-01-24 @ 17:37)  Wt(kg): --    03-31 @ 07:01  -  04-01 @ 07:00  --------------------------------------------------------  IN: 680 mL / OUT: 2 mL / NET: 678 mL    04-01 @ 07:01  -  04-01 @ 18:04  --------------------------------------------------------  IN: 840 mL / OUT: 0 mL / NET: 840 mL        PHYSICAL EXAM:  Constitutional: NAD  HEENT: anicteric sclera, oropharynx clear.  Neck: No JVD  Respiratory: CTAB, no wheezes, rales or rhonchi  Cardiovascular: S1, S2, RRR  Gastrointestinal: BS+, soft, NT/ND  Extremities:  No peripheral edema  Neurological: A/O x 3, no focal deficits  Vascular Access: IJ permacat     LABS:  04-01    133<L>  |  98  |  66<H>  ----------------------------<  231<H>  4.7   |  26  |  6.30<H>    Ca    8.5      01 Apr 2024 05:05  Phos  6.6     04-01  Mg     2.9     04-01    TPro  6.6  /  Alb  3.0<L>  /  TBili  0.3  /  DBili      /  AST  10  /  ALT  24  /  AlkPhos  79  04-01    Creatinine Trend: 6.30 <--, 4.17 <--, 5.15 <--, 4.96 <--, 3.57 <--, 5.56 <--                        10.3   5.74  )-----------( 209      ( 01 Apr 2024 05:05 )             32.4     Urine Studies:  Urinalysis Basic - ( 01 Apr 2024 05:05 )    Color:  / Appearance:  / SG:  / pH:   Gluc: 231 mg/dL / Ketone:   / Bili:  / Urobili:    Blood:  / Protein:  / Nitrite:    Leuk Esterase:  / RBC:  / WBC    Sq Epi:  / Non Sq Epi:  / Bacteria:         RADIOLOGY & ADDITIONAL STUDIES:

## 2024-04-01 NOTE — DISCHARGE NOTE NURSING/CASE MANAGEMENT/SOCIAL WORK - PATIENT PORTAL LINK FT
You can access the FollowMyHealth Patient Portal offered by [facility] by registering at the following website: [email or web address]. By joining [facility]’s FollowMyHealth portal, you will also be able to view your health information using other applications (apps) compatible with our system.

## 2024-04-01 NOTE — DISCHARGE NOTE PROVIDER - NSDCMRMEDTOKEN_GEN_ALL_CORE_FT
atorvastatin 10 mg oral tablet: 1 tab(s) orally once a day (at bedtime)  ergocalciferol 50 mcg (2000 intl units) oral capsule: 1 cap(s) orally every 7 days  ferrous sulfate 325 mg (65 mg elemental iron) oral tablet: 1 tab(s) orally once a day  gabapentin 300 mg oral capsule: 1 cap(s) orally 2 times a day  hydrALAZINE 50 mg oral tablet: 1 tab(s) orally 3 times a day  Lasix 80 mg oral tablet: 1 tab(s) orally 3 to 4 times a week Please take this medication Tuesday, Thursday, Saturday on the days you are not going for dialysis.  metoprolol succinate 25 mg oral tablet, extended release: 1 tab(s) orally once a day  NIFEdipine 90 mg oral tablet, extended release: 1 tab(s) orally once a day  predniSONE 10 mg oral tablet: 1 tab(s) orally once a day  sevelamer carbonate 800 mg oral tablet: 2 tab(s) orally 3 times a day

## 2024-04-01 NOTE — PROGRESS NOTE ADULT - SUBJECTIVE AND OBJECTIVE BOX
Time of Visit:  Patient seen and examined.     MEDICATIONS  (STANDING):  atorvastatin 10 milliGRAM(s) Oral at bedtime  chlorhexidine 2% Cloths 1 Application(s) Topical <User Schedule>  ferrous    sulfate 325 milliGRAM(s) Oral daily  furosemide    Tablet 80 milliGRAM(s) Oral <User Schedule>  gabapentin 300 milliGRAM(s) Oral two times a day  heparin   Injectable 5000 Unit(s) SubCutaneous every 12 hours  hydrALAZINE 50 milliGRAM(s) Oral three times a day  influenza   Vaccine 0.5 milliLiter(s) IntraMuscular once  insulin glargine Injectable (LANTUS) 5 Unit(s) SubCutaneous at bedtime  insulin lispro (ADMELOG) corrective regimen sliding scale   SubCutaneous three times a day before meals  insulin lispro (ADMELOG) corrective regimen sliding scale   SubCutaneous at bedtime  metoprolol tartrate 12.5 milliGRAM(s) Oral two times a day  mupirocin 2% Ointment 1 Application(s) Both Nostrils two times a day  NIFEdipine XL 90 milliGRAM(s) Oral daily  polyethylene glycol 3350 17 Gram(s) Oral daily  predniSONE   Tablet 10 milliGRAM(s) Oral daily  senna 2 Tablet(s) Oral at bedtime  sevelamer carbonate 800 milliGRAM(s) Oral every 8 hours      MEDICATIONS  (PRN):  acetaminophen     Tablet .. 650 milliGRAM(s) Oral every 6 hours PRN Temp greater or equal to 38C (100.4F), Mild Pain (1 - 3)       Medications up to date at time of exam.    ROS; No fever, chills, cough , congestion on exam. Denies SOB.    PHYSICAL EXAMINATION:    Vital Signs Last 24 Hrs  T(C): 36.6 (01 Apr 2024 13:55), Max: 36.8 (31 Mar 2024 21:53)  T(F): 97.9 (01 Apr 2024 13:55), Max: 98.2 (31 Mar 2024 21:53)  HR: 78 (01 Apr 2024 13:55) (78 - 90)  BP: 118/79 (01 Apr 2024 13:55) (118/79 - 142/79)  BP(mean): 87 (31 Mar 2024 21:53) (87 - 92)  RR: 18 (01 Apr 2024 13:55) (18 - 23)  SpO2: 99% (01 Apr 2024 13:55) (97% - 100%)    Parameters below as of 01 Apr 2024 13:55  Patient On (Oxygen Delivery Method): room air       (if applicable)    General: Alert and oriented. Able to answer question with no SOB. No acute distress.     HEENT: Head is normocephalic and atraumatic. Extraocular muscles are intact. Mucous membranes are moist.     NECK: Supple, no palpable adenopathy.    LUNGS: Clear to auscultation bilaterally with no wheezing, rales, or rhonchi. No use of accessory muscle.     HEART: S1 S2 Regular rate and no click / rub.     ABDOMEN: Soft, nontender, and nondistended. Active bowel sounds.     EXTREMITIES: Without any cyanosis, clubbing, rash, lesions or edema.    NEUROLOGIC: Awake, alert, oriented.     SKIN: Warm and moist . Non diaphoretic.       LABS:                        10.3   5.74  )-----------( 209      ( 01 Apr 2024 05:05 )             32.4     04-01    133<L>  |  98  |  66<H>  ----------------------------<  231<H>  4.7   |  26  |  6.30<H>    Ca    8.5      01 Apr 2024 05:05  Phos  6.6     04-01  Mg     2.9     04-01    TPro  6.6  /  Alb  3.0<L>  /  TBili  0.3  /  DBili  x   /  AST  10  /  ALT  24  /  AlkPhos  79  04-01      Urinalysis Basic - ( 01 Apr 2024 05:05 )    Color: x / Appearance: x / SG: x / pH: x  Gluc: 231 mg/dL / Ketone: x  / Bili: x / Urobili: x   Blood: x / Protein: x / Nitrite: x   Leuk Esterase: x / RBC: x / WBC x   Sq Epi: x / Non Sq Epi: x / Bacteria: x      MICROBIOLOGY: (if applicable)    RADIOLOGY & ADDITIONAL STUDIES:  EKG:   CXR: < from: Xray Chest 1 View- PORTABLE-Urgent (03.30.24 @ 06:10) >    ACC: [id number] EXAM:  XR CHEST PORTABLE URGENT 1V   ORDERED BY: LORENA SPEARS     PROCEDURE DATE:  03/30/2024          INTERPRETATION:  AP chest on March 30, 2024 at 6:03 AM. Patient is short   of breath.    Heart magnified by technique. Large right jugular line again noted.    There is persistent mild to moderate CHF somewhat increased from March 24.    IMPRESSION: CHF somewhat increased from prior.    --- End of Report ---            MARIXA SANTA MD; Attending Radiologist  This document has been electronically signed. Mar 30 2024 12:52PM    < end of copied text >    ECHO:    IMPRESSION: 53y Female PAST MEDICAL & SURGICAL HISTORY:  Sarcoidosis      Diabetes      Hypertension      Hyperlipidemia      Chronic kidney disease (CKD), stage III (moderate)      Seizure disorder      No significant past surgical history       Impression: This is a 54 Y/O Female with ESRD on HD 3x  a week , Sarcoidosis on Prednisone. Presented to ED for shortness of breath that started early a night prior to going to ED . Admitted for Acute hypoxic resp failure  secondary to pulmonary edema due to fluid overload in the setting of ESRD requiring urgent hemodialysis and hypertensive emergency .    Suggestion:  O2 saturation 96% room air. So far saturating good room air.   Dialysis per Nephro .  On Lasix 80 mg Q M-W-F.  Continue Prednisone 10 mg Oral Daily.   DVT / GI prophylactic. On Heparin 5,000 Units SQ Q 12 Hours.   Reinforce the importance to compliance to daily medications , Dialysis , 1200 ml fluid restriction .

## 2024-04-01 NOTE — PROGRESS NOTE ADULT - NS ATTEND AMEND GEN_ALL_CORE FT
Impression: This is a 52 Y/O Female with ESRD on HD 3x  a week , Sarcoidosis on Prednisone. Presented to ED for shortness of breath that started early a night prior to going to ED . Admitted for Acute hypoxic resp failure  secondary to pulmonary edema due to fluid overload in the setting of ESRD requiring urgent hemodialysis and hypertensive emergency .    Suggestion:  O2 saturation 96% room air. So far saturating good room air.   Dialysis per Nephro .  On Lasix 80 mg Q M-W-F.  Continue Prednisone 10 mg Oral Daily.   DVT / GI prophylactic. On Heparin 5,000 Units SQ Q 12 Hours.   Reinforce the importance to compliance to daily medications , Dialysis , 1200 ml fluid restriction .

## 2024-04-01 NOTE — PROGRESS NOTE ADULT - TIME BILLING
-chart review, including review of H&P, ICU transfer note, Nephrology documentation  -lab review  -imaging review  -history and exam  -clinical decision making  -DC planning  -documenting encounter, including progress note and discharge note documentation   -counseling patient on importance of fluid restriction

## 2024-04-01 NOTE — PROGRESS NOTE ADULT - PROBLEM SELECTOR PLAN 1
#AHRF in the setting of volume overload 2/2 ESRD  - status post emergent HD  - 1.2L fluid restriction   - Dr. Estrada consulted   -pending additional HD today 4/1  -symptoms resolved with HD

## 2024-04-01 NOTE — DISCHARGE NOTE PROVIDER - NSDCCPCAREPLAN_GEN_ALL_CORE_FT
PRINCIPAL DISCHARGE DIAGNOSIS  Diagnosis: Acute hypoxic respiratory failure  Assessment and Plan of Treatment: You came to the hospital with difficulty breathing. Your imaging showed a collection of fluid in your lungs, known as pulmonary edema and fluid overload in your body. This can happen if you consume too much fluid, as you have end stage renal disease of your kidneys and have trouble removing fluid from your body. In the hospital, you     PRINCIPAL DISCHARGE DIAGNOSIS  Diagnosis: Acute hypoxic respiratory failure  Assessment and Plan of Treatment: You came to the hospital with difficulty breathing. Your imaging showed a collection of fluid in your lungs, known as pulmonary edema and fluid overload in your body. This can happen if you consume too much fluid, as you have end stage renal disease of your kidneys and have trouble removing fluid from your body. In the hospital, you underwent emergent hemodialysis and were admitted to the ICU for blood pressure management with pressor medications. After dialysis, your symptoms improved and you were downgraded to the medical floor. You received another round of dialysis and you returned to baseline.      SECONDARY DISCHARGE DIAGNOSES  Diagnosis: ESRD on dialysis  Assessment and Plan of Treatment: End-stage renal (or kidney) disease happens when your kidneys can no longer do their jobs. They can't remove waste from your blood. And they aren't able to balance your body's fluids and chemicals. This stage of the disease usually occurs after you have chronic kidney disease for years. Now the kidneys work so poorly that you need dialysis or a kidney transplant to live. Dialysis is a treatment to help filter waste from your blood.  In the hospital, you underwent emergency hemodialysis twice and you were advised to limit your fluid intake to 1.2 L daily, by Nephrology Dr. Estrada. After discharge, PLEASE CONTINUE TO GO TO DIALYSIS AND FOLLOW UP WITH THE INSTRUCTIONS OF YOUR NEPHROLOGIST. Do not drink large amounts of fluid as this can be dangerous to your health and cause fluid to build up in your body again, which can cause difficulty breathing.    Diagnosis: Hypertension  Assessment and Plan of Treatment: You have previously been diagnosed with hypertension (high blood pressure). We managed your blood pressure during your hospitalization as required. You originally required IV pressor medications for blood pressure management. However, after dialysis your pressure improved and you were transitioned to your home BP medications. Please take your medications on time and as prescribed. Monitor your blood pressure at home, keep a log of your home readings and follow up with your Primary Care Physician. As per AHA/ACC guidelines, it is important to adhere to a DASH Diet of fresh fruits, vegetables, lean meats such as poultry and fish, and whole wheat carbs. 30 minutes of aerobic exercise per day 3-4 times a week, reducing salt intake <1.5g/day, and cutting down on highly processed foods are also shown to reduce high blood pressure. Uncontrolled BP may result in organ damage to your eyes, brain, heart, and kidneys by causing strokes, heart attacks, kidney failure, and bleeds in your eye. Please follow up with your primary care physician within 1-2 weeks after discharge and routinely after.      Diagnosis: Sarcoidosis  Assessment and Plan of Treatment: You have sarcoidosis. Sarcoidosis is a rare disease that causes tiny lumps of cells throughout the body called granulomas. They can form anywhere on the inside or outside of the body and can cause permanent scar tissue. They often form in the lungs, lymph nodes, liver, skin, or eyes. Sarcoidosis may affect how an organ works. For instance, if it is in the lungs, you may be short of breath. Please continue to take your medication, Prednisone 10 mg daily for management of this condition. Follow up with your primary care provider in the next 1-2 weeks for further care.       Diagnosis: DM (diabetes mellitus)  Assessment and Plan of Treatment: You have previously been diagnosed with diabetes mellitus. We managed you on sliding scale insulin while you were hospitalized with good glycemic control. Continue with your blood sugar medication. Please check your blood sugar levels twice daily - Morning/Afternoon, and Lunch/Bedtime the following day. It is important to keep a record of your blood sugar readings. We recommend you maintain a healthy diet that is low in sugar, carbohydrates and fat. Please limit your intake of high sugar and high carbohydrate foods such as pasta, rice, and bread. Exercise frequently as tolerated. Please follow up with you primary care physician within one week of your discharge to further manage your diabetes and monitor your Hemoglobin A1c levels after 3 months to evaluate your diabetes control.      Diagnosis: Hyperlipidemia  Assessment and Plan of Treatment: You have previously been diagnosed with high cholesterol. We continued your home medications during your hospitalization. Please continue to take your medications as prescribed and follow up with your primary doctor within 1 week of discharge and routinely after for further management.

## 2024-04-01 NOTE — PROGRESS NOTE ADULT - PROBLEM SELECTOR PLAN 4
-s/p Nitroglycerin drip in ED, BP reduction of 25% maintained x 24 hrs  -EKG Sinus tachycardia   - restarted home BP meds       - nifedipine XL 90mg PO daily       - hydralazine 50mg PO TID       - metoprolol 12.5mg PO BID  BP improved post HD

## 2024-04-01 NOTE — PROGRESS NOTE ADULT - REASON FOR ADMISSION
AHRF secondary to fluid overload in the setting of ESRD

## 2024-04-01 NOTE — PROGRESS NOTE ADULT - ASSESSMENT
53 year old female from home ambulates independently, with PMH of HTN, DM2, ESRD on HD Tue/Thur/Sat (last session 3/28 )sarcoidosis on prednisone, anemia, seizure disorder (no longer on meds) presenting to ED for shortness of breath that started early last night  admitted for AHRF secondary to pulmonary edema due to fluid overload in the setting of ESRD requiring urgent hemodialysis. Admitted to ICU for hypertensive emergency requiring nitroglycerin drip and emergent HD. In ICU pt had her HD session and was weaned from nitro drip back onto her home antihypertensives.  She is on a 1.2L fluid restriction and requires strict adherence to this restriction. Downgraded to medicine floor for additional HD.

## 2024-04-01 NOTE — DISCHARGE NOTE PROVIDER - HOSPITAL COURSE
Pt is a 53 year old female from home ambulates independently, with PMH of HTN, DM2, ESRD on HD Tue/Thur/Sat (last session 3/28) sarcoidosis on prednisone, anemia, seizure disorder (no longer on meds) presenting to ED for shortness of breath that started early last night  admitted for AHRF secondary to pulmonary edema due to fluid overload in the setting of ESRD requiring urgent hemodialysis. Admitted to ICU for hypertensive emergency requiring nitroglycerin drip and emergent HD.       In ICU pt had her HD session and was weaned from nitro drip back onto her home antihypertensives.  She is on a 1.2L fluid restriction and requires strict adherence to this restriction.      She is stable for transfer and was signed out to Dr Bedoya & Dr Morin PGY-3.    For medicine team follow-up:  [ ] SW for resumption of out-patient HD services  [ ] monitor glucose  [ ] monitor BP and fluid intake for compliance.   Pt is a 53 year old female from home ambulates independently, with PMH of HTN, DM2, ESRD on HD Tue/Thur/Sat (last session 3/28) sarcoidosis on prednisone, anemia, seizure disorder (no longer on meds) who presented to ED for shortness of breath that started early the night prior. She was admitted for AHRF secondary to pulmonary edema due to fluid overload in the setting of ESRD requiring urgent hemodialysis. Admitted to ICU for hypertensive emergency requiring nitroglycerin drip and emergent HD. In ICU pt underwent HD session and was weaned from nitro drip back onto her home antihypertensives.  She is on a 1.2L fluid restriction and requires strict adherence to this restriction.  Pt underwent additional HD on 4/1 and HD was resumed on regular schedule. In the hospital, Nephrology Dr. Estrada followed the patient. Pt has a history of sarcoidosis for which her homemed Prednisone 10 mg daily was continued. Patient had Hyperkalemia, given 1g Ca, 40  lasix, 5 Insulin, .4 Nitro, and 5 Lokelma. K+ normalized. Patient tested positive for Influenza A on prior admission 3/24 and again this admission 3/20. Patient received 3 total doses of Tamiflu with HD, now completed. For history of DM, patient continued on lantus 5 units qHS and ISS. Her symptoms improved and she was medically stable for discharge. Pt is a 53 year old female from home ambulates independently, with PMH of HTN, DM2, ESRD on HD Tue/Thur/Sat (last session 3/28) sarcoidosis on prednisone, anemia, seizure disorder (no longer on meds) who presented to ED for shortness of breath that started early the night prior. She was admitted for AHRF secondary to pulmonary edema due to fluid overload in the setting of ESRD requiring urgent hemodialysis. Admitted to ICU for hypertensive emergency requiring nitroglycerin drip and emergent HD. In ICU pt underwent HD session and was weaned from nitro drip back onto her home antihypertensives.  She is on a 1.2L fluid restriction and requires strict adherence to this restriction.  Pt underwent additional HD on 4/1 and HD was resumed on regular schedule. In the hospital, Nephrology Dr. Estrada followed the patient. Pt has a history of sarcoidosis for which her homemed Prednisone 10 mg daily was continued. Patient had Hyperkalemia, given 1g Ca, 40 Lasix, 5 Insulin, .4 Nitro, and 5 Lokelma. K+ normalized. Patient tested positive for Influenza A on prior admission 3/24 and again this admission 3/20. Patient received 3 total doses of Tamiflu with HD, now completed. For history of DM, patient continued on lantus 5 units qHS and ISS. Her symptoms improved and she was medically stable for discharge. Pt is a 53 year old female from home ambulates independently, with PMH of HTN, DM2, ESRD on HD Tue/Thur/Sat (last session 3/28) sarcoidosis on prednisone, anemia, seizure disorder (no longer on meds) who presented to ED for shortness of breath that started early the night prior. She was admitted for AHRF secondary to pulmonary edema due to fluid overload in the setting of ESRD requiring urgent hemodialysis. Admitted to ICU for hypertensive emergency requiring nitroglycerin drip and emergent HD. In ICU pt underwent HD session and was weaned from nitro drip back onto her home antihypertensives.  She is on a 1.2L fluid restriction and requires strict adherence to this restriction.  Pt underwent additional HD on 4/1 and HD was resumed on regular schedule. In the hospital, Nephrology Dr. Estrada followed the patient. Pt has a history of sarcoidosis for which her home med Prednisone 10 mg daily was continued. Patient had Hyperkalemia, given 1g Ca, 40 Lasix, 5 Insulin, .4 Nitro, and 5 Lokelma. K+ normalized. Patient tested positive for Influenza A on prior admission 3/24 and again this admission 3/20. Patient received 3 total doses of Tamiflu with HD, now completed. For history of DM, patient continued on lantus 5 units qHS and ISS. Her symptoms improved and she was medically stable for discharge.     Attending Attestation:    Agree with the above. Patient was admitted for acute hypoxic respiratory failure secondary to pulmonary edema as well as hypertensive urgency, each requiring urgent HD. She was dialyzed in the ICU as well as an additional session on the floor on 4/1 with improvement in her clinical status and resolution of her hypoxia. She was counseled on importance of fluid restriction and she expressed understanding. She was asymptomatic on day of DC 4/1 and requesting DC. She will be dialyzed again the following day 4/2 at her outpatient center [facility] in [facility]. She is also in the process for being worked up and listed for kidney transplantation. She says she no longer urinates, and this more recent change seems to coincide with these past several admissions for volume overload. Perhaps her dry weight should be reset/more fluid taken off with each session at [facility].     She was counseled to return to the ER for any new or worsening symptoms. Pt is a 53 year old female from home ambulates independently, with PMH of HTN, DM2, ESRD on HD Tue/Thur/Sat (last session 3/28) sarcoidosis on prednisone, anemia, seizure disorder (no longer on meds) who presented to ED for shortness of breath that started early the night prior. She was admitted for AHRF secondary to pulmonary edema due to fluid overload in the setting of ESRD requiring urgent hemodialysis. Admitted to ICU for hypertensive emergency requiring nitroglycerin drip and emergent HD. In ICU pt underwent HD session and was weaned from nitro drip back onto her home antihypertensives.  She is on a 1.2L fluid restriction and requires strict adherence to this restriction.  Pt underwent additional HD on 4/1 and HD was resumed on regular schedule. In the hospital, Nephrology Dr. Estrada followed the patient. Pt has a history of sarcoidosis for which her home med Prednisone 10 mg daily was continued. Patient had Hyperkalemia, given 1g Ca, 40 Lasix, 5 Insulin, .4 Nitro, and 5 Lokelma. K+ normalized. Patient tested positive for Influenza A on prior admission 3/24 and again this admission. Patient received 3 total doses of Tamiflu with HD, now completed. For history of DM, patient continued on lantus 5 units qHS and ISS. Her symptoms improved and she was medically stable for discharge.     Attending Attestation:    Agree with the above. Patient was admitted for acute hypoxic respiratory failure secondary to pulmonary edema as well as hypertensive urgency, each requiring urgent HD. She was dialyzed in the ICU as well as an additional session on the floor on 4/1 with improvement in her clinical status and resolution of her hypoxia. She was counseled on importance of fluid restriction and she expressed understanding. She was asymptomatic on day of DC 4/1 and requesting DC. She will be dialyzed again the following day 4/2 at her outpatient center [facility] in [facility]. She is also in the process for being worked up and listed for kidney transplantation. She says she no longer urinates, and this more recent change seems to coincide with these past several admissions for volume overload. Perhaps her dry weight should be reset/more fluid taken off with each session at [facility].     She was counseled to return to the ER for any new or worsening symptoms. Pt is a 53 year old female from home ambulates independently, with PMH of HTN, DM2, ESRD on HD Tue/Thur/Sat (last session 3/28) sarcoidosis on prednisone, anemia, seizure disorder (no longer on meds) who presented to ED for shortness of breath that started early the night prior. She was admitted for AHRF secondary to pulmonary edema due to fluid overload in the setting of ESRD requiring urgent hemodialysis. Admitted to ICU for hypertensive emergency requiring nitroglycerin drip and emergent HD. In ICU pt underwent HD session and was weaned from nitro drip back onto her home antihypertensives.  She is on a 1.2L fluid restriction and requires strict adherence to this restriction.  Pt underwent additional HD on 4/1 and HD was resumed on regular schedule. In the hospital, Nephrology Dr. Estrada followed the patient. Pt has a history of sarcoidosis for which her home med Prednisone 10 mg daily was continued. Patient had Hyperkalemia, given 1g Ca, 40 Lasix, 5 Insulin, .4 Nitro, and 5 Lokelma. K+ normalized. Patient tested positive for Influenza A on prior admission 3/24 and again this admission. Patient received 3 total doses of Tamiflu with HD, now completed. For history of DM, patient continued on lantus 5 units qHS and ISS. Her symptoms improved and she was medically stable for discharge.     Attending Attestation:    Agree with the above. Patient was admitted for acute hypoxic respiratory failure secondary to pulmonary edema as well as hypertensive urgency, each requiring urgent HD. She was dialyzed in the ICU as well as an additional session on the floor on 4/1 with improvement in her clinical status and resolution of her hypoxia. She was counseled on importance of fluid restriction and she expressed understanding. She was asymptomatic on day of DC 4/1 and requesting DC. She will be dialyzed again the following day 4/2 at her outpatient center [facility] in [facility]. She is also in the process for being worked up and listed for kidney transplantation. She says she no longer urinates, and this more recent change seems to coincide with these past several admissions for volume overload. Perhaps her dry weight should be reset/more fluid taken off with each session at [facility].     She was counseled to return to the ER for any new or worsening symptoms.     Patient completed HD session late in the evening on 4/1 and requesting DC home with family the same day.

## 2024-04-01 NOTE — DISCHARGE NOTE PROVIDER - NSDCFUSCHEDAPPT_GEN_ALL_CORE_FT
[facility] [address]  Scheduled Appointment: 04/23/2024    Valentin Leslie  [facility] [address]  Scheduled Appointment: 04/23/2024    Rosie Fagan  [address]  Scheduled Appointment: 04/30/2024

## 2024-04-01 NOTE — DISCHARGE NOTE PROVIDER - PROVIDER TOKENS
FREE:[LAST:[[place]],FIRST:[[place]],PHONE:[(   )    -],FAX:[(   )    -],ADDRESS:[[address] [place] [address]]]

## 2024-04-01 NOTE — PROGRESS NOTE ADULT - SUBJECTIVE AND OBJECTIVE BOX
PGY-1 Progress Note discussed with attending    PAGER #: [] TILL 5:00 PM  PLEASE CONTACT ON CALL TEAM:  - On Call Team (Please refer to Aline) FROM 5:00 PM - 8:30PM  - Nightfloat Team FROM 8:30 -7:30 AM      INTERVAL HPI/OVERNIGHT EVENTS: Pt downgraded from ICU after being weaned off of IV pressor medication for BP management. S/p 1 session HD. WIll undergo additional session today      REVIEW OF SYSTEMS:  CONSTITUTIONAL: No fever, weight loss, or fatigue  RESPIRATORY: No cough, wheezing, chills or hemoptysis; No shortness of breath  CARDIOVASCULAR: No chest pain, palpitations, dizziness, or leg swelling  GASTROINTESTINAL: No abdominal pain. No nausea, vomiting, or hematemesis; No diarrhea or constipation. No melena or hematochezia.  GENITOURINARY: No dysuria or hematuria, urinary frequency  NEUROLOGICAL: No headaches, memory loss, loss of strength, numbness, or tremors  SKIN: No itching, burning, rashes, or lesions     MEDICATIONS  (STANDING):  atorvastatin 10 milliGRAM(s) Oral at bedtime  chlorhexidine 2% Cloths 1 Application(s) Topical <User Schedule>  ferrous    sulfate 325 milliGRAM(s) Oral daily  furosemide    Tablet 80 milliGRAM(s) Oral <User Schedule>  gabapentin 300 milliGRAM(s) Oral two times a day  heparin   Injectable 5000 Unit(s) SubCutaneous every 12 hours  hydrALAZINE 50 milliGRAM(s) Oral three times a day  influenza   Vaccine 0.5 milliLiter(s) IntraMuscular once  insulin glargine Injectable (LANTUS) 5 Unit(s) SubCutaneous at bedtime  insulin lispro (ADMELOG) corrective regimen sliding scale   SubCutaneous at bedtime  insulin lispro (ADMELOG) corrective regimen sliding scale   SubCutaneous three times a day before meals  metoprolol tartrate 12.5 milliGRAM(s) Oral two times a day  mupirocin 2% Ointment 1 Application(s) Both Nostrils two times a day  NIFEdipine XL 90 milliGRAM(s) Oral daily  polyethylene glycol 3350 17 Gram(s) Oral daily  predniSONE   Tablet 10 milliGRAM(s) Oral daily  senna 2 Tablet(s) Oral at bedtime  sevelamer carbonate 800 milliGRAM(s) Oral every 8 hours    MEDICATIONS  (PRN):  acetaminophen     Tablet .. 650 milliGRAM(s) Oral every 6 hours PRN Temp greater or equal to 38C (100.4F), Mild Pain (1 - 3)      Vital Signs Last 24 Hrs  T(C): 36.6 (01 Apr 2024 13:55), Max: 36.8 (31 Mar 2024 21:53)  T(F): 97.9 (01 Apr 2024 13:55), Max: 98.2 (31 Mar 2024 21:53)  HR: 76 (01 Apr 2024 16:30) (76 - 87)  BP: 136/69 (01 Apr 2024 16:30) (118/79 - 142/79)  BP(mean): 87 (31 Mar 2024 21:53) (87 - 87)  RR: 18 (01 Apr 2024 13:55) (18 - 18)  SpO2: 99% (01 Apr 2024 13:55) (97% - 100%)    Parameters below as of 01 Apr 2024 13:55  Patient On (Oxygen Delivery Method): room air        PHYSICAL EXAMINATION:  GENERAL: NAD, well built  HEAD:  Atraumatic, Normocephalic  EYES:  conjunctiva and sclera clear  NECK: Supple, No JVD, Normal thyroid  CHEST/LUNG: Clear to auscultation. Clear to percussion bilaterally; No rales, rhonchi, wheezing, or rubs  HEART: Regular rate and rhythm; No murmurs, rubs, or gallops  ABDOMEN: Soft, Nontender, Nondistended; Bowel sounds present, no pain or masses on palpation  NERVOUS SYSTEM:  Alert & Oriented X3  : voiding well  EXTREMITIES:  2+ Peripheral Pulses, No clubbing, cyanosis, or edema  SKIN: warm dry                          10.3   5.74  )-----------( 209      ( 01 Apr 2024 05:05 )             32.4     04-01    133<L>  |  98  |  66<H>  ----------------------------<  231<H>  4.7   |  26  |  6.30<H>    Ca    8.5      01 Apr 2024 05:05  Phos  6.6     04-01  Mg     2.9     04-01    TPro  6.6  /  Alb  3.0<L>  /  TBili  0.3  /  DBili  x   /  AST  10  /  ALT  24  /  AlkPhos  79  04-01    LIVER FUNCTIONS - ( 01 Apr 2024 05:05 )  Alb: 3.0 g/dL / Pro: 6.6 g/dL / ALK PHOS: 79 U/L / ALT: 24 U/L DA / AST: 10 U/L / GGT: x                   I&O's Summary    31 Mar 2024 07:01  -  01 Apr 2024 07:00  --------------------------------------------------------  IN: 680 mL / OUT: 2 mL / NET: 678 mL    01 Apr 2024 07:01  -  01 Apr 2024 17:39  --------------------------------------------------------  IN: 840 mL / OUT: 0 mL / NET: 840 mL            CAPILLARY BLOOD GLUCOSE      RADIOLOGY & ADDITIONAL TESTS:

## 2024-04-01 NOTE — PROGRESS NOTE ADULT - PROBLEM SELECTOR PLAN 3
hx of ESRD on HD (T, R, Sat)  Nephro Dr. Estrada consulted  s/p HD x2  1.2 L fluid restriction advised

## 2024-04-01 NOTE — PROGRESS NOTE ADULT - ASSESSMENT
# ESRD. admitted with flash pulmonary edema. S/p emergent HD on Saturday with improvement of symptoms.   UF today to optimize volume status.   # Anemia of CKD. resume PRESTON with HD upon D/C   # HTN. blood pressure controlled.  # hyponatremia- dilutional in light of hypervolemia. fluid restriction  # renal osteodystrophy. cont sevelamer TIW.  #

## 2024-04-01 NOTE — DISCHARGE NOTE NURSING/CASE MANAGEMENT/SOCIAL WORK - NURSING SECTION COMPLETE
Patient/Caregiver provided printed discharge information.
<-- Click to add NO pertinent Past Medical History

## 2024-04-18 ENCOUNTER — OUTPATIENT (OUTPATIENT)
Dept: OUTPATIENT SERVICES | Facility: HOSPITAL | Age: 54
LOS: 1 days | End: 2024-04-18
Payer: MEDICARE

## 2024-04-18 ENCOUNTER — APPOINTMENT (OUTPATIENT)
Dept: INTERNAL MEDICINE | Facility: CLINIC | Age: 54
End: 2024-04-18
Payer: MEDICARE

## 2024-04-18 VITALS
DIASTOLIC BLOOD PRESSURE: 80 MMHG | HEIGHT: 60 IN | HEART RATE: 85 BPM | WEIGHT: 105 LBS | BODY MASS INDEX: 20.62 KG/M2 | SYSTOLIC BLOOD PRESSURE: 162 MMHG | OXYGEN SATURATION: 98 %

## 2024-04-18 DIAGNOSIS — I10 ESSENTIAL (PRIMARY) HYPERTENSION: ICD-10-CM

## 2024-04-18 DIAGNOSIS — Z86.79 PERSONAL HISTORY OF OTHER DISEASES OF THE CIRCULATORY SYSTEM: ICD-10-CM

## 2024-04-18 DIAGNOSIS — Z99.2 DEPENDENCE ON RENAL DIALYSIS: ICD-10-CM

## 2024-04-18 DIAGNOSIS — L65.9 NONSCARRING HAIR LOSS, UNSPECIFIED: ICD-10-CM

## 2024-04-18 DIAGNOSIS — Z09 ENCOUNTER FOR FOLLOW-UP EXAMINATION AFTER COMPLETED TREATMENT FOR CONDITIONS OTHER THAN MALIGNANT NEOPLASM: ICD-10-CM

## 2024-04-18 DIAGNOSIS — R60.0 LOCALIZED EDEMA: ICD-10-CM

## 2024-04-18 PROCEDURE — 99214 OFFICE O/P EST MOD 30 MIN: CPT | Mod: GC

## 2024-04-18 PROCEDURE — G0463: CPT

## 2024-04-18 NOTE — REVIEW OF SYSTEMS
[Abdominal Pain] : abdominal pain [Muscle Pain] : muscle pain [Back Pain] : back pain [Dizziness] : dizziness [Unsteady Walking] : ataxia [Fever] : no fever [Chills] : no chills [Night Sweats] : no night sweats [Chest Pain] : no chest pain [Palpitations] : no palpitations [Orthopnea] : no orthopnea [Paroxysmal Nocturnal Dyspnea] : no paroxysmal nocturnal dyspnea [Shortness Of Breath] : no shortness of breath [Wheezing] : no wheezing [Cough] : no cough [Nausea] : no nausea [Vomiting] : no vomiting [Heartburn] : no heartburn [Dysuria] : no dysuria [Hematuria] : no hematuria [Joint Pain] : no joint pain [Joint Stiffness] : no joint stiffness [Fainting] : no fainting [FreeTextEntry8] : oliguric

## 2024-04-18 NOTE — HISTORY OF PRESENT ILLNESS
[Family Member] : family member [FreeTextEntry1] : 53F presenting for follow-up.  [de-identified] : Last hospitalized 3 weeks ago for flash pulmonary edema in s/o hypertensive emergency. Overall feeling well since then. Measures BP at home, SBP ranges . When hypertensive, patient is asymptomatic. When BP is low, she will eat a salty snack and BP improves, but feels neck pain and lightheadedness at that time. Takes furosemide 80mg on non-HD days. Usually has 2.5L removed during HD sessions.

## 2024-04-18 NOTE — PHYSICAL EXAM
[No Acute Distress] : no acute distress [Well-Appearing] : well-appearing [Normal] : normal rate, regular rhythm, normal S1 and S2 and no murmur heard [Soft] : abdomen soft [Non Tender] : non-tender [No CVA Tenderness] : no CVA  tenderness [No Spinal Tenderness] : no spinal tenderness [No Joint Swelling] : no joint swelling [Grossly Normal Strength/Tone] : grossly normal strength/tone [Normal Gait] : normal gait [Normal Affect] : the affect was normal [de-identified] : no paraspinal muscle tenderness

## 2024-04-19 RX ORDER — FUROSEMIDE 80 MG/1
80 TABLET ORAL
Qty: 60 | Refills: 3 | Status: ACTIVE | COMMUNITY
Start: 2024-03-21 | End: 1900-01-01

## 2024-04-19 RX ORDER — CYCLOBENZAPRINE HYDROCHLORIDE 5 MG/1
5 TABLET, FILM COATED ORAL
Qty: 60 | Refills: 1 | Status: ACTIVE | COMMUNITY
Start: 2024-01-04 | End: 1900-01-01

## 2024-04-19 RX ORDER — NYSTATIN 100MM UNIT
POWDER (EA) MISCELLANEOUS
Qty: 1 | Refills: 0 | Status: ACTIVE | COMMUNITY
Start: 2021-08-18 | End: 1900-01-01

## 2024-04-21 NOTE — REVIEW OF SYSTEMS
[Chest Pain] : chest pain [Lower Ext Edema] : lower extremity edema [Orthopnea] : orthopnea [Paroxysmal Nocturnal Dyspnea] : paroxysmal nocturnal dyspnea [Shortness Of Breath] : shortness of breath [Dyspnea on Exertion] : dyspnea on exertion [Hair Changes] : hair changes [Negative] : Heme/Lymph [Palpitations] : no palpitations [Leg Claudication] : no leg claudication [Wheezing] : no wheezing [Cough] : no cough [de-identified] : dry skin

## 2024-04-21 NOTE — HISTORY OF PRESENT ILLNESS
[Post-hospitalization from ___ Hospital] : Post-hospitalization from [unfilled] Hospital [Admitted on: ___] : The patient was admitted on [unfilled] [Discharged on ___] : discharged on [unfilled] [Discharge Summary] : discharge summary [Pertinent Labs] : pertinent labs [Radiology Findings] : radiology findings [Discharge Med List] : discharge medication list [Med Reconciliation] : medication reconciliation has been completed [Patient Contacted By: ____] : and contacted by [unfilled] [FreeTextEntry2] : 53F w HTN, DM2, ESRD on HD M/W/F c/b , sarcoidosis, anemia, seizure disorder (no longer on meds) presents for post-discharge follow up. Patient was recently admitted from 3/4-5 at [facility] for flash pulmonary edema and HTN emergency with SBP 230s, elevated proBNP, b/l pleffs; pt treated w HD and was weaned down to room air. Pt then readmitted 3/10-13 with similar presentation but also with elevated troponin w/o ECG changes; troponins downtrended and pt discharged with additional PO lasix 80mg qd on non HD days.  Discharge Labs: Hgb 10.0 stable, MCV 92.8, RDW 14, BUN/SCr 39/6.05, K 4.6, Mg 2.7, Ph 4.7, TropI-HS (3/4) 103 -> 68.5, (3/10) 102.9, (3/11) 90.2, proBNP (1/7) 84k -> (3/4) 158k -> (3/10) 104k ECG: Sinus Tachycardia 108bpm, QTc 447ms, HI 178ms, no ST segment elevation or depression, TWI in leads I, aVL, V1 are new when compared to prior EKG from 3/4/2024  Recent Hospitalizations: 3/10/2024 - 3/13/2024: AHRF 2/2 Flash Pulmonary Edema, elevated troponins 3/4/2024 - 3/5/2024: AHRF 2/2 Flash Pulmonary Edema. BP 230s systolic. 2/9/2024 - 2/10/2024: Sepsis 2/2 Influenza and Diarrhea 1/5/2024 - 1/7/2024: Hyperkalemia, Fluid Overload, Positive for rhinovirus 12/27/2023 - 12/29/2023: Hypertensive 205/111 with missed dialysis sessions.  Daughter present at visit. Today, pt states she still has episodes of SOB on exertion and states she frequently wakes up at night with SOB. She states she uses 4-5 pillows at night, has orthopnea and PND that resolves upon waking up and walking around for 30-60 minutes. She also complains of midsternal/epigastric pain that occurs with these SOB episodes, denies heartburn, nausea/vomiting. She also notes increased hairloss and produced a full gallon ziplock bag of hair that she collected from brushing her hair -- she denies any patchy areas, scalp irritiations, no new hair products, shampoos. She also notes worsening diffuse scaly dry skin, denies any lesions, bruising, new skin products, body wash, laundry detergent. She thinks she is perimenopausal with LMP > 6 months ago.  Pt otherwise denies any other interval hospital / UC / ED visits, fever, chills, n/v/c/d, palpitations, abd discomfort, dysuria, urine/fecal incontinence, rashes, sick contacts, recent travel.   Pt presented with labs drawn from HD 3/15 -- A1c 5.8%, Hgb 9.9, Fe 59, TIBC 161, , LDL 48, Tg 297, Tot chol 167.   atorvastatin 10 mg oral tablet: 1 tab(s) orally once a day (at bedtime) ergocalciferol 50 mcg (2000 intl units) oral capsule: 1 cap(s) orally every 7 days ferrous sulfate 325 mg (65 mg elemental iron) oral tablet: 1 tab(s) orally once a day gabapentin 300 mg oral capsule: 1 cap(s) orally 2 times a day hydrALAZINE 50 mg oral tablet: 1 tab(s) orally 3 times a day Lasix 80 mg oral tablet: 1 tab(s) orally 3 to 4 times a week Please take this medication Tuesday, Thursday, Saturday on the days you are not going for dialysis. metoprolol succinate 25 mg oral tablet, extended release: 1 tab(s) orally once a day NIFEdipine 90 mg oral tablet, extended release: 1 tab(s) orally once a day predniSONE 10 mg oral tablet: 1 tab(s) orally once a day sevelamer carbonate 800 mg oral tablet: 2 tab(s) orally 3 times a day

## 2024-04-21 NOTE — HEALTH RISK ASSESSMENT
[Intercurrent hospitalizations] : was admitted to the hospital  [1 or 2 (0 pts)] : 1 or 2 (0 points) [Never (0 pts)] : Never (0 points) [No] : In the past 12 months have you used drugs other than those required for medical reasons? No [No falls in past year] : Patient reported no falls in the past year [Never] : Never [0-4] : 0-4 [de-identified] : 3/10/2024 - 3/13/2024: AHRF 2/2 Flash Pulmonary Edema, elevated troponins 3/4/2024 - 3/5/2024: AHRF 2/2 Flash Pulmonary Edema. BP 230s systolic. [Audit-CScore] : 0

## 2024-04-21 NOTE — ASSESSMENT
[FreeTextEntry1] : 53F w HTN, DM2, ESRD on HD M/W/F c/b , sarcoidosis, anemia, seizure disorder (no longer on meds) presents for post-discharge follow up.   # ESRD on HD MWF - c/w sevelamer 800x2 tid - obtain vaccine records - labs to be obtained prior to next visit at HD  # HTN - obtain home BP monitor - c/w hydralazine 50 tid, nifedipine 60 qd, lasix 80mg qd on nonHD days  # Pulmonary edema - under consideration for home O2 if needed - f/u TTE, cardiology referral  # T2DM; POCT A1c 5.8%, on chronic prednisone - now off of insulin, trulicity, oral antiglycemic agents on advice of nephrologist @ HD center given normal A1c - ctm q3mo  # Sarcoidosis - c/w prednisone 10mg qd - pt to follow up with Dr. Leslie  # Dry Skin, Hairloss - advised pt to review symptoms with her rheumatologist Dr. Leslie at her appt next week - will ask HD center to draw TSH, fT4 to r/o hypothyroidism  # Anemia, BASIM - f/u labs, repeat anemia w/u - H/H stable, no active bleed - iron level wnl, can discontinue iron supplementation  # HM - mammo, pap, colonoscopy referrals, FIT given - missing flu, PCV20, shingrix; pt will bring vaccine documentation from HD center to next visit  Return to clinic in 1 month for CPE or PRN. Case discussed w/ Dr. Red Gallardo MD PGY-2 Internal [facility]

## 2024-04-21 NOTE — PHYSICAL EXAM
[No Acute Distress] : no acute distress [Well Developed] : well developed [Well Nourished] : well nourished [Well-Appearing] : well-appearing [Normal Sclera/Conjunctiva] : normal sclera/conjunctiva [Normal Outer Ear/Nose] : the outer ears and nose were normal in appearance [EOMI] : extraocular movements intact [Normal Oropharynx] : the oropharynx was normal [No JVD] : no jugular venous distention [Thyroid Normal, No Nodules] : the thyroid was normal and there were no nodules present [No Respiratory Distress] : no respiratory distress  [No Accessory Muscle Use] : no accessory muscle use [Clear to Auscultation] : lungs were clear to auscultation bilaterally [Regular Rhythm] : with a regular rhythm [Normal Rate] : normal rate  [Normal S1, S2] : normal S1 and S2 [No Carotid Bruits] : no carotid bruits [No Abdominal Bruit] : a ~M bruit was not heard ~T in the abdomen [No Varicosities] : no varicosities [Pedal Pulses Present] : the pedal pulses are present [No Edema] : there was no peripheral edema [No Extremity Clubbing/Cyanosis] : no extremity clubbing/cyanosis [No Palpable Aorta] : no palpable aorta [Non Tender] : non-tender [Soft] : abdomen soft [Non-distended] : non-distended [No HSM] : no HSM [No Masses] : no abdominal mass palpated [Normal Bowel Sounds] : normal bowel sounds [Normal Posterior Cervical Nodes] : no posterior cervical lymphadenopathy [No CVA Tenderness] : no CVA  tenderness [Normal Anterior Cervical Nodes] : no anterior cervical lymphadenopathy [No Spinal Tenderness] : no spinal tenderness [Grossly Normal Strength/Tone] : grossly normal strength/tone [No Joint Swelling] : no joint swelling [No Rash] : no rash [Coordination Grossly Intact] : coordination grossly intact [No Focal Deficits] : no focal deficits [Normal Gait] : normal gait [Deep Tendon Reflexes (DTR)] : deep tendon reflexes were 2+ and symmetric [Normal Affect] : the affect was normal [Normal Insight/Judgement] : insight and judgment were intact [81454 - High Complexity requires an extensive number of possible diagnoses and/or the management options, extensive complexity of the medical data (tests, etc.) to be reviewed, and a high risk of significant complications, morbidity, and/or mortality as w] : High Complexity  [de-identified] : +orthopnea [de-identified] : +midsystolic murmur, no reproducible pain on palpation

## 2024-04-22 ENCOUNTER — APPOINTMENT (OUTPATIENT)
Dept: CARE COORDINATION | Facility: CLINIC | Age: 54
End: 2024-04-22

## 2024-04-22 DIAGNOSIS — Z09 ENCOUNTER FOR FOLLOW-UP EXAMINATION AFTER COMPLETED TREATMENT FOR CONDITIONS OTHER THAN MALIGNANT NEOPLASM: ICD-10-CM

## 2024-04-22 DIAGNOSIS — Z86.79 PERSONAL HISTORY OF OTHER DISEASES OF THE CIRCULATORY SYSTEM: ICD-10-CM

## 2024-04-22 DIAGNOSIS — Z99.2 DEPENDENCE ON RENAL DIALYSIS: ICD-10-CM

## 2024-04-22 DIAGNOSIS — R60.0 LOCALIZED EDEMA: ICD-10-CM

## 2024-04-22 DIAGNOSIS — L65.9 NONSCARRING HAIR LOSS, UNSPECIFIED: ICD-10-CM

## 2024-04-23 ENCOUNTER — APPOINTMENT (OUTPATIENT)
Dept: PULMONOLOGY | Facility: CLINIC | Age: 54
End: 2024-04-23

## 2024-04-26 RX ORDER — ALBUTEROL SULFATE 90 UG/1
108 (90 BASE) INHALANT RESPIRATORY (INHALATION)
Qty: 1 | Refills: 3 | Status: ACTIVE | COMMUNITY
Start: 2019-02-21 | End: 1900-01-01

## 2024-04-26 NOTE — ED ADULT NURSE NOTE - PRO INTERPRETER NEED 2
[FreeTextEntry1] : Mr Rios is a 53 years old male patient with history of chronic pancreatitis s/p multiple CBD AND PD stents ( from stones )  who present or evaluation of newly diagnosed diabetes   #newly diagnosed diabetes  - over past 2 years patient was diagnosed with acute on Chronic Pancreatitis with PD stones with chronic abdominal pain Enlarged GB with slow motility requiring multiple ERCP and stents  - recently admitted to the hospital with same and Bg were found to be elevated  - 4/2024 A1c 6.5% , used to follow prior regularly with PCP and no DM diagnosis  - since diagnosis patient on metformin 500 mg daily , he did lose a lot of weight due to pancreas illness and did major modification to his diet  - now FS acceptable log reviewed  English

## 2024-04-30 ENCOUNTER — APPOINTMENT (OUTPATIENT)
Dept: ENDOCRINOLOGY | Facility: CLINIC | Age: 54
End: 2024-04-30

## 2024-05-08 ENCOUNTER — APPOINTMENT (OUTPATIENT)
Dept: OBGYN | Facility: CLINIC | Age: 54
End: 2024-05-08

## 2024-05-16 ENCOUNTER — APPOINTMENT (OUTPATIENT)
Dept: CARDIOLOGY | Facility: CLINIC | Age: 54
End: 2024-05-16

## 2024-06-03 NOTE — PATIENT PROFILE ADULT - FUNCTIONAL ASSESSMENT - DAILY ACTIVITY 6.
Pamela Tejeda is a 21 year old female presenting with bilateral upper back pain by the shoulders and midline lower back pain and bilateral hip pain.      ALLERGIES:   Allergen Reactions    Cat Dander HIVES     Patient did not wish to have testing     Penicillin G Other (See Comments)     Patient has never been tested,but dad is allergic, and is afraid to test her    Pollen Other (See Comments)     Patient suspects tree pollen in spring and weed pollen in fall cause sneezing runny and stuffy nose. Patient did not wish to have testing.        Denies Latex allergy or sensitivity.     Patient notes that she currently is not a smoker.    There were no vitals taken for this visit.      No changes to Patient's medical history or social history since their last visit.           4 = No assist / stand by assistance

## 2024-06-12 ENCOUNTER — APPOINTMENT (OUTPATIENT)
Dept: INTERNAL MEDICINE | Facility: CLINIC | Age: 54
End: 2024-06-12

## 2024-06-25 NOTE — ED ADULT NURSE NOTE - NS ED NURSE PATIENT LEFT UNIT TIME
Goal Outcome Evaluation:  Plan of Care Reviewed With: patient  Patient Agreement with Plan of Care: agrees   Patient alert and oriented and calm and cooperative with staff. Patient compliant with medications. Patient denies S/I, H/I or hallucinations. Patient has been out and about interacting appropriately with peers . No inappropriate or aggressive behavior noted. Will continue to monitor for any changes in  mood or behavior.                                       16:54

## 2024-09-04 NOTE — PATIENT PROFILE ADULT - CAREGIVER NAME
Inpatient consult to Nephrology  Consult performed by: Enid Mora MD  Consult ordered by: Bindu Miranda MD      Reason for consult:  Kidney injury      History Of Present Illness  Juan is a 76 year old male with PMH sig for CAD s/p CABG, HFrEF, HTN, HLD, s/p pacemaker placement and ICD. Recent hospitalization for MELINDA and is s/p renal biopsy which showed acute tubular injury, hypertensive nephrosclerosis, reflux uropathy. 33% global sclerosis was noted.  Patient was initiated on dialysis during this last hospitalization, but was discharged off dialysis with RIJ tunneled HD catheter in place in case dialysis needed to be reinitiated. Last hemodialysis session was 8/28.  Patient presents back to the hospital after he was noted to have increase in creatinine to 3.23 on outpatient labs. Repeat creatinine in the ED was 3.01. Creatinine was 3.02 at time of last hospital discharge.  Patient currently denies SOB, CP, n/v or abd pain. He is making urine. He reports lower extremity swelling. He is currently not taking any diuretics.       Past Medical History  Past Medical History:   Diagnosis Date    Acute hemodialysis patient (CMD) 08/2024    HD 1 week ago 8/28/ has had approx 3-4 times over last 14 days(/took 1-2 pts off 1 week ago over 2  3hr sessions    Cardiac pacemaker in situ     pacemaker/AICD    Essential (primary) hypertension     pt states htn was a result of a bp pill  (lisinopril) // now off of meds for bp and bp is good    Gout     High cholesterol         Surgical History  Past Surgical History:   Procedure Laterality Date    Ankle surgery Right     Coronary artery bypass graft      x 3    Hip surgery Bilateral     Ir kidney biopsy Right 08/29/2024    Permacath Right 08/28/2024    subclavian permacath    Tonsillectomy      when 14 yrs        Social History  Social History     Tobacco Use    Smoking status: Never    Smokeless tobacco: Never   Vaping Use    Vaping status: never used   Substance  Use Topics    Alcohol use: Yes     Comment: social  )1-2 beers on occasion (twice a week at  [employer] or a vodka tonic)    Drug use: Never       Family History    Family History   Problem Relation Age of Onset    Cancer Mother         breast/ mother was in an orphanage in [place] (came from [place])    Diabetes Father     Natural Causes Paternal Grandmother     Natural Causes Paternal Grandfather         came from [place]        Allergies  ALLERGIES:  Seasonal    Medications  Current Facility-Administered Medications   Medication Dose Route Frequency Provider Last Rate Last Admin    calcitRIOL (ROCALTROL) capsule 0.25 mcg  0.25 mcg Oral Daily Renee Smith MD        metoPROLOL succinate (TOPROL-XL) ER tablet 25 mg  25 mg Oral Daily Renee Smith MD        rosuvastatin (CRESTOR) tablet 10 mg  10 mg Oral Daily Renee Smith MD        traZODone (DESYREL) tablet 50 mg  50 mg Oral Nightly Renee Smith MD        acetaminophen (TYLENOL) tablet 650 mg  650 mg Oral Q4H PRN Renee Smith MD        Or    acetaminophen (TYLENOL) suppository 650 mg  650 mg Rectal Q4H PRN Renee Smith MD        ondansetron (ZOFRAN ODT) disintegrating tablet 4 mg  4 mg Oral Q6H PRN Renee Smith MD        Or    ondansetron (ZOFRAN) injection 4 mg  4 mg Intravenous Q12H PRN Renee Smith MD        polyethylene glycol (MIRALAX) packet 17 g  17 g Oral Daily PRN Renee Smith MD        Potassium Standard Replacement Protocol (Levels 3.5 and lower)   Does not apply See Admin Instructions Renee Smith MD        Potassium Replacement (Levels 3.6 - 4)   Does not apply See Admin Instructions Renee Smith MD        Magnesium Standard Replacement Protocol   Does not apply See Admin Instructions Renee Smith MD        cyclobenzaprine (FLEXERIL) tablet 5 mg  5 mg Oral TID PRN Renee Smith MD        calcium carbonate (TUMS) chewable tablet 500 mg  500 mg Oral Q4H PRN Renee Smith MD         famotidine (PEPCID) tablet 20 mg  20 mg Oral Daily Renee Smith MD   20 mg at 09/04/24 1006    sodium chloride 0.9 % flush bag 25 mL  25 mL Intravenous PRN Renee Smith MD        sodium chloride 0.9 % injection 2 mL  2 mL Intracatheter 2 times per day Renee Smith MD   2 mL at 09/04/24 1007    potassium CHLORIDE (KLOR-CON M) zane ER tablet 40 mEq  40 mEq Oral Once Renee Smith MD            REVIEW OF SYSTEMS:  A comprehensive review of systems was conducted and is negative except as per HPI      PHYSICAL EXAM:  Visit Vitals  /78 (BP Location: LFA - Left forearm, Patient Position: Sitting)   Pulse 88   Temp 97.5 °F (36.4 °C) (Oral)   Resp 16   Ht 5' 11\" (1.803 m)   Wt 102.8 kg (226 lb 10.1 oz)   SpO2 97%   BMI 31.61 kg/m²     GEN: NAD  HEENT: NCAT    CHEST: faint crackles at the bases   CARDIAC: S1S2 normal  ABD: soft, NT/ND  EXT: + lower ext edema  NEURO: awake, alert  SKIN: warm, dry   DIALYSIS ACCESS: RIJ tunneled HD catheter     Intake and Output    Intake/Output Summary (Last 24 hours) at 9/4/2024 1037  Last data filed at 9/4/2024 0800  Gross per 24 hour   Intake 480 ml   Output 150 ml   Net 330 ml       Labs  Sodium (mmol/L)   Date Value   09/04/2024 138     Potassium (mmol/L)   Date Value   09/04/2024 4.1     Chloride (mmol/L)   Date Value   09/04/2024 108     Glucose (mg/dL)   Date Value   09/04/2024 90     Calcium (mg/dL)   Date Value   09/04/2024 9.6     Carbon Dioxide (mmol/L)   Date Value   09/04/2024 24     BUN (mg/dL)   Date Value   09/04/2024 53 (H)     Creatinine (mg/dL)   Date Value   09/04/2024 2.76 (H)     WBC (K/mcL)   Date Value   09/04/2024 6.0     RBC (mil/mcL)   Date Value   09/04/2024 3.23 (L)     HCT (%)   Date Value   09/04/2024 31.8 (L)     HGB (g/dL)   Date Value   09/04/2024 10.3 (L)     PLT (K/mcL)   Date Value   09/04/2024 126 (L)       Imaging   No orders to display          Assessment and Plan:  This is a 76 year old male with PMH sig for CAD s/p CABG,  HFrEF, HTN, HLD, s/p pacemaker placement and ICD. Recent hospitalization for MELINDA requiring dialysis. Patient was discharged from the hospital off dialysis. He presents back to the hospital due to increase in creatinine.     MELINDA on CKD stage 3    - baseline sCr ~1.4-1.8 earlier this year   - MELINDA secondary to biopsy proven acute tubular injury, hypertensive nephrosclerosis and reflux uropathy also noted. 33% global sclerosis was noted.   - Patient previously requiring iHD, last session 8/28, still has RIJ tunneled HD cath in place   - sCr 3.02 at time of hospital discharge on  8/31  - sCr 3.01 this admission --> 2.76 today    - no acute indication for RRT.  If patient remains off dialysis can likely remove tunneled HD catheter soon    - start torsemide 20 mg daily given LE edema  - repeat labs in AM   - follow renal fxn and I/Os     HTN  - metoprolol     Dispo: potential discharge tomorrow pending labs. Will need close outpatient monitoring of patient's renal fxn.     Dw Dr. Kovacs and RN     Thank you for the consult. We will continue to follow.      Enid Mora MD  [facility] Nephrology      [place] [place]

## 2024-10-08 ENCOUNTER — EMERGENCY (EMERGENCY)
Facility: HOSPITAL | Age: 54
LOS: 1 days | Discharge: LEFT BEFORE DISCHARGE | End: 2024-10-08
Payer: MEDICARE

## 2024-10-08 VITALS
TEMPERATURE: 98 F | DIASTOLIC BLOOD PRESSURE: 83 MMHG | WEIGHT: 139.99 LBS | HEART RATE: 90 BPM | RESPIRATION RATE: 16 BRPM | SYSTOLIC BLOOD PRESSURE: 142 MMHG | OXYGEN SATURATION: 98 %

## 2024-10-08 PROCEDURE — L9991: CPT

## 2024-10-08 NOTE — ED ADULT NURSE NOTE - PATIENT'S PREFERRED PRONOUN
PT IRP Treatment    Primary Rehabilitation Diagnosis: R TKR  Expected Discharge Date: 09/14/18 (PM; no reconference needed)  Planned Discharge Destination: Home    SUBJECTIVE: Subjective: pts son present during part of session  (09/09/18 1400)  Subjective/Objective Comments: pt asked for pain medication and was given to pt during session  (09/09/18 1400)    OBJECTIVE:  Precautions  Weight Bearing Status: Weight bearing as tolerated left lower extremity (09/09/18 1400)  Precautions Comments: s/p R TKA (09/06/18 0915)    See below for current functional status overview.  See PT flowsheet for full details regarding the PT therapy provided.    ASSESSMENT: assist of 2 for CPM placement increased to 70 from 65 ; ROM 10-80 lacking extension and facial grimacing with flexion. Pt completed gait with walker longer distance in hallway. Tolerated manual therapy for pain reduction ; pain pills given as well as ice for pain relief     EDUCATION:   On this date, education was provided to patient regarding  ambulation  The response to education was/were: Demonstrates understanding    PLAN:   Continue skilled PT, including the following Treatment/Interventions: Functional transfer training;Strengthening;Bed mobility;Gait training;Neuromuscular re-education (09/09/18 1400)   PT Frequency: 7 days/week (09/09/18 1400), Frequency Comments: 90 mins at least 5 days/ week (09/09/18 1400)    Treatment Plan for Next Session: progress transfers, ambulation tolerance, stairs, LE exercises and right knee STM as appropriate, standing balance  Additional Plan Considerations: check use of CPM for right knee       RECOMMENDATIONS FOR DISCHARGE:  Recommendation for Discharge: PT: Intensive therapy program    PT/OT Mobility Equipment for Discharge: has standard walker, continue to assess (09/09/18 1101)  PT/OT ADL Equipment for Discharge: has reacher, chair in walk in shower, toilet riser (09/08/18 1400)      FUNCTIONAL DATA OVERVIEW LAST 24  HOURS  Bed Mobility   Bed Mobility  Rolling to the Right: Supervision (Supv) (09/09/18 1400)  Rolling to the Left: Supervision (Supv) (09/09/18 1400)  Boosting: Total Assist - Dependent (09/09/18 1400)  Sit to Supine: Minimal Assist (Min) (09/09/18 1400)  Bed Mobility Comments: assist to bring LE's up to bed surface  (09/09/18 1400)    Transfers  Transfers  Sit to Stand: Supervision (Supv) (09/09/18 1400)  Stand to Sit: Supervision (Supv) (09/09/18 1400)  Stand Pivot Transfers: Supervision (Supv) (09/09/18 1101)  Assistive Device/: 1 Person (09/09/18 1400)  Transfer Comments 1: cues for hand sequencing  (09/09/18 1400)  Transfer Comments 2: see am comments (09/08/18 1604)    Gait  Gait  Gait Assistance: Supervision (Supv) (09/09/18 1400)  Assistive Device/: 2-wheeled walker (09/09/18 1400)  Ambulation Distance (Feet): 150 Feet (09/09/18 1400)  Pattern: Decreased stance time R (09/09/18 1400)  Ambulation Surface: Tile (09/08/18 1604)  Gait Comments 1: decreased full knee flexion during swing phase of gait  (09/09/18 1400)  Gait Comments 2: see am comments (09/08/18 1604),      Stairs  Stairs Mobility  Number of Stairs: 3 (09/09/18 1101)  Stair Management Assistance: Minimal Assist (Min) (09/09/18 1101)  Stair Management Technique: Two rails;Step to pattern (09/09/18 1101)  Stairs Mobility Comments: patient ascends up with left LE and descends down with right LE (09/09/18 1101)    Wheelchair Mobility       Balance       Neuromuscular Re-education      Her/She

## 2024-10-09 ENCOUNTER — INPATIENT (INPATIENT)
Facility: HOSPITAL | Age: 54
LOS: 2 days | Discharge: HOME | DRG: 640 | End: 2024-10-12
Attending: INTERNAL MEDICINE | Admitting: INTERNAL MEDICINE
Payer: MEDICARE

## 2024-10-09 VITALS
SYSTOLIC BLOOD PRESSURE: 234 MMHG | HEART RATE: 115 BPM | TEMPERATURE: 98 F | WEIGHT: 114.64 LBS | OXYGEN SATURATION: 88 % | DIASTOLIC BLOOD PRESSURE: 128 MMHG | RESPIRATION RATE: 16 BRPM

## 2024-10-09 DIAGNOSIS — R06.02 SHORTNESS OF BREATH: ICD-10-CM

## 2024-10-09 LAB
ALBUMIN SERPL ELPH-MCNC: 2.8 G/DL — LOW (ref 3.5–5)
ALBUMIN SERPL ELPH-MCNC: 3.4 G/DL — LOW (ref 3.5–5)
ALP SERPL-CCNC: 48 U/L — SIGNIFICANT CHANGE UP (ref 40–120)
ALP SERPL-CCNC: 61 U/L — SIGNIFICANT CHANGE UP (ref 40–120)
ALT FLD-CCNC: 20 U/L DA — SIGNIFICANT CHANGE UP (ref 10–60)
ALT FLD-CCNC: 30 U/L DA — SIGNIFICANT CHANGE UP (ref 10–60)
ANION GAP SERPL CALC-SCNC: 11 MMOL/L — SIGNIFICANT CHANGE UP (ref 5–17)
ANION GAP SERPL CALC-SCNC: 11 MMOL/L — SIGNIFICANT CHANGE UP (ref 5–17)
ANION GAP SERPL CALC-SCNC: 12 MMOL/L — SIGNIFICANT CHANGE UP (ref 5–17)
AST SERPL-CCNC: 12 U/L — SIGNIFICANT CHANGE UP (ref 10–40)
AST SERPL-CCNC: 46 U/L — HIGH (ref 10–40)
BASOPHILS # BLD AUTO: 0.04 K/UL — SIGNIFICANT CHANGE UP (ref 0–0.2)
BASOPHILS NFR BLD AUTO: 0.3 % — SIGNIFICANT CHANGE UP (ref 0–2)
BILIRUB SERPL-MCNC: 0.4 MG/DL — SIGNIFICANT CHANGE UP (ref 0.2–1.2)
BILIRUB SERPL-MCNC: 0.4 MG/DL — SIGNIFICANT CHANGE UP (ref 0.2–1.2)
BUN SERPL-MCNC: 76 MG/DL — HIGH (ref 7–18)
BUN SERPL-MCNC: 79 MG/DL — HIGH (ref 7–18)
BUN SERPL-MCNC: 82 MG/DL — HIGH (ref 7–18)
CALCIUM SERPL-MCNC: 7.1 MG/DL — LOW (ref 8.4–10.5)
CALCIUM SERPL-MCNC: 8.2 MG/DL — LOW (ref 8.4–10.5)
CALCIUM SERPL-MCNC: 8.7 MG/DL — SIGNIFICANT CHANGE UP (ref 8.4–10.5)
CHLORIDE SERPL-SCNC: 100 MMOL/L — SIGNIFICANT CHANGE UP (ref 96–108)
CHLORIDE SERPL-SCNC: 103 MMOL/L — SIGNIFICANT CHANGE UP (ref 96–108)
CHLORIDE SERPL-SCNC: 98 MMOL/L — SIGNIFICANT CHANGE UP (ref 96–108)
CO2 SERPL-SCNC: 20 MMOL/L — LOW (ref 22–31)
CO2 SERPL-SCNC: 21 MMOL/L — LOW (ref 22–31)
CO2 SERPL-SCNC: 25 MMOL/L — SIGNIFICANT CHANGE UP (ref 22–31)
CREAT SERPL-MCNC: 12.3 MG/DL — HIGH (ref 0.5–1.3)
CREAT SERPL-MCNC: 13.3 MG/DL — HIGH (ref 0.5–1.3)
CREAT SERPL-MCNC: 13.5 MG/DL — HIGH (ref 0.5–1.3)
EGFR: 3 ML/MIN/1.73M2 — LOW
EOSINOPHIL # BLD AUTO: 0.32 K/UL — SIGNIFICANT CHANGE UP (ref 0–0.5)
EOSINOPHIL NFR BLD AUTO: 2.7 % — SIGNIFICANT CHANGE UP (ref 0–6)
FLUAV AG NPH QL: SIGNIFICANT CHANGE UP
FLUBV AG NPH QL: SIGNIFICANT CHANGE UP
GLUCOSE SERPL-MCNC: 159 MG/DL — HIGH (ref 70–99)
GLUCOSE SERPL-MCNC: 172 MG/DL — HIGH (ref 70–99)
GLUCOSE SERPL-MCNC: 193 MG/DL — HIGH (ref 70–99)
HCT VFR BLD CALC: 31.1 % — LOW (ref 34.5–45)
HGB BLD-MCNC: 10.5 G/DL — LOW (ref 11.5–15.5)
IMM GRANULOCYTES NFR BLD AUTO: 0.4 % — SIGNIFICANT CHANGE UP (ref 0–0.9)
LYMPHOCYTES # BLD AUTO: 1.87 K/UL — SIGNIFICANT CHANGE UP (ref 1–3.3)
LYMPHOCYTES # BLD AUTO: 15.7 % — SIGNIFICANT CHANGE UP (ref 13–44)
MCHC RBC-ENTMCNC: 30.7 PG — SIGNIFICANT CHANGE UP (ref 27–34)
MCHC RBC-ENTMCNC: 33.8 GM/DL — SIGNIFICANT CHANGE UP (ref 32–36)
MCV RBC AUTO: 90.9 FL — SIGNIFICANT CHANGE UP (ref 80–100)
MONOCYTES # BLD AUTO: 0.55 K/UL — SIGNIFICANT CHANGE UP (ref 0–0.9)
MONOCYTES NFR BLD AUTO: 4.6 % — SIGNIFICANT CHANGE UP (ref 2–14)
NEUTROPHILS # BLD AUTO: 9.1 K/UL — HIGH (ref 1.8–7.4)
NEUTROPHILS NFR BLD AUTO: 76.3 % — SIGNIFICANT CHANGE UP (ref 43–77)
NRBC # BLD: 0 /100 WBCS — SIGNIFICANT CHANGE UP (ref 0–0)
NRBC BLD-RTO: 0 /100 WBCS — SIGNIFICANT CHANGE UP (ref 0–0)
PLATELET # BLD AUTO: 242 K/UL — SIGNIFICANT CHANGE UP (ref 150–400)
POTASSIUM SERPL-MCNC: 4.9 MMOL/L — SIGNIFICANT CHANGE UP (ref 3.5–5.3)
POTASSIUM SERPL-MCNC: 5.6 MMOL/L — HIGH (ref 3.5–5.3)
POTASSIUM SERPL-MCNC: 6.8 MMOL/L — CRITICAL HIGH (ref 3.5–5.3)
POTASSIUM SERPL-SCNC: 4.9 MMOL/L — SIGNIFICANT CHANGE UP (ref 3.5–5.3)
POTASSIUM SERPL-SCNC: 5.6 MMOL/L — HIGH (ref 3.5–5.3)
POTASSIUM SERPL-SCNC: 6.8 MMOL/L — CRITICAL HIGH (ref 3.5–5.3)
PROT SERPL-MCNC: 5.9 G/DL — LOW (ref 6–8.3)
PROT SERPL-MCNC: 7.6 G/DL — SIGNIFICANT CHANGE UP (ref 6–8.3)
RBC # BLD: 3.42 M/UL — LOW (ref 3.8–5.2)
RBC # FLD: 13.2 % — SIGNIFICANT CHANGE UP (ref 10.3–14.5)
SARS-COV-2 RNA SPEC QL NAA+PROBE: SIGNIFICANT CHANGE UP
SODIUM SERPL-SCNC: 133 MMOL/L — LOW (ref 135–145)
SODIUM SERPL-SCNC: 134 MMOL/L — LOW (ref 135–145)
SODIUM SERPL-SCNC: 134 MMOL/L — LOW (ref 135–145)
WBC # BLD: 11.93 K/UL — HIGH (ref 3.8–10.5)
WBC # FLD AUTO: 11.93 K/UL — HIGH (ref 3.8–10.5)

## 2024-10-09 PROCEDURE — 71045 X-RAY EXAM CHEST 1 VIEW: CPT | Mod: 26

## 2024-10-09 PROCEDURE — 71275 CT ANGIOGRAPHY CHEST: CPT | Mod: 26,MC

## 2024-10-09 PROCEDURE — 99223 1ST HOSP IP/OBS HIGH 75: CPT

## 2024-10-09 PROCEDURE — 74174 CTA ABD&PLVS W/CONTRAST: CPT | Mod: 26,MC

## 2024-10-09 PROCEDURE — 99285 EMERGENCY DEPT VISIT HI MDM: CPT

## 2024-10-09 RX ORDER — NITROGLYCERIN 20 MG/G
1 OINTMENT TOPICAL ONCE
Refills: 0 | Status: COMPLETED | OUTPATIENT
Start: 2024-10-09 | End: 2024-10-09

## 2024-10-09 RX ORDER — FUROSEMIDE 10 MG/ML
40 INJECTION INTRAMUSCULAR; INTRAVENOUS ONCE
Refills: 0 | Status: DISCONTINUED | OUTPATIENT
Start: 2024-10-09 | End: 2024-10-09

## 2024-10-09 RX ORDER — LABETALOL HYDROCHLORIDE 200 MG/1
10 TABLET, FILM COATED ORAL ONCE
Refills: 0 | Status: COMPLETED | OUTPATIENT
Start: 2024-10-09 | End: 2024-10-09

## 2024-10-09 RX ORDER — FUROSEMIDE 10 MG/ML
60 INJECTION INTRAMUSCULAR; INTRAVENOUS ONCE
Refills: 0 | Status: COMPLETED | OUTPATIENT
Start: 2024-10-09 | End: 2024-10-09

## 2024-10-09 RX ORDER — ACETAMINOPHEN 500 MG/5ML
1000 LIQUID (ML) ORAL ONCE
Refills: 0 | Status: COMPLETED | OUTPATIENT
Start: 2024-10-09 | End: 2024-10-09

## 2024-10-09 RX ORDER — MIDAZOLAM IN 0.9 % SOD.CHLORID 1 MG/ML
1 PLASTIC BAG, INJECTION (ML) INTRAVENOUS ONCE
Refills: 0 | Status: DISCONTINUED | OUTPATIENT
Start: 2024-10-09 | End: 2024-10-09

## 2024-10-09 RX ADMIN — FUROSEMIDE 60 MILLIGRAM(S): 10 INJECTION INTRAMUSCULAR; INTRAVENOUS at 17:18

## 2024-10-09 RX ADMIN — LABETALOL HYDROCHLORIDE 10 MILLIGRAM(S): 200 TABLET, FILM COATED ORAL at 22:58

## 2024-10-09 RX ADMIN — Medication 4 MILLIGRAM(S): at 17:04

## 2024-10-09 RX ADMIN — Medication 4 MILLIGRAM(S): at 16:34

## 2024-10-09 RX ADMIN — NITROGLYCERIN 1 INCH(S): 20 OINTMENT TOPICAL at 16:34

## 2024-10-09 RX ADMIN — Medication 1 MILLIGRAM(S): at 17:19

## 2024-10-10 DIAGNOSIS — E87.5 HYPERKALEMIA: ICD-10-CM

## 2024-10-10 DIAGNOSIS — N93.9 ABNORMAL UTERINE AND VAGINAL BLEEDING, UNSPECIFIED: ICD-10-CM

## 2024-10-10 DIAGNOSIS — I16.0 HYPERTENSIVE URGENCY: ICD-10-CM

## 2024-10-10 DIAGNOSIS — E78.5 HYPERLIPIDEMIA, UNSPECIFIED: ICD-10-CM

## 2024-10-10 DIAGNOSIS — N18.6 END STAGE RENAL DISEASE: ICD-10-CM

## 2024-10-10 DIAGNOSIS — D86.9 SARCOIDOSIS, UNSPECIFIED: ICD-10-CM

## 2024-10-10 DIAGNOSIS — Z29.9 ENCOUNTER FOR PROPHYLACTIC MEASURES, UNSPECIFIED: ICD-10-CM

## 2024-10-10 DIAGNOSIS — J96.01 ACUTE RESPIRATORY FAILURE WITH HYPOXIA: ICD-10-CM

## 2024-10-10 LAB
A1C WITH ESTIMATED AVERAGE GLUCOSE RESULT: 6.8 % — HIGH (ref 4–5.6)
ALBUMIN SERPL ELPH-MCNC: 3.4 G/DL — LOW (ref 3.5–5)
ALP SERPL-CCNC: 59 U/L — SIGNIFICANT CHANGE UP (ref 40–120)
ALT FLD-CCNC: 27 U/L DA — SIGNIFICANT CHANGE UP (ref 10–60)
ANION GAP SERPL CALC-SCNC: 13 MMOL/L — SIGNIFICANT CHANGE UP (ref 5–17)
ANION GAP SERPL CALC-SCNC: 9 MMOL/L — SIGNIFICANT CHANGE UP (ref 5–17)
AST SERPL-CCNC: 21 U/L — SIGNIFICANT CHANGE UP (ref 10–40)
BASOPHILS # BLD AUTO: 0.05 K/UL — SIGNIFICANT CHANGE UP (ref 0–0.2)
BASOPHILS NFR BLD AUTO: 0.5 % — SIGNIFICANT CHANGE UP (ref 0–2)
BILIRUB SERPL-MCNC: 0.5 MG/DL — SIGNIFICANT CHANGE UP (ref 0.2–1.2)
BUN SERPL-MCNC: 18 MG/DL — SIGNIFICANT CHANGE UP (ref 7–18)
BUN SERPL-MCNC: 43 MG/DL — HIGH (ref 7–18)
CALCIUM SERPL-MCNC: 8.5 MG/DL — SIGNIFICANT CHANGE UP (ref 8.4–10.5)
CALCIUM SERPL-MCNC: 8.5 MG/DL — SIGNIFICANT CHANGE UP (ref 8.4–10.5)
CANCER AG125 SERPL-ACNC: 44 U/ML — HIGH
CEA SERPL-MCNC: 3.4 NG/ML — SIGNIFICANT CHANGE UP (ref 0–3.8)
CHLORIDE SERPL-SCNC: 94 MMOL/L — LOW (ref 96–108)
CHLORIDE SERPL-SCNC: 96 MMOL/L — SIGNIFICANT CHANGE UP (ref 96–108)
CO2 SERPL-SCNC: 27 MMOL/L — SIGNIFICANT CHANGE UP (ref 22–31)
CO2 SERPL-SCNC: 28 MMOL/L — SIGNIFICANT CHANGE UP (ref 22–31)
CREAT SERPL-MCNC: 4.82 MG/DL — HIGH (ref 0.5–1.3)
CREAT SERPL-MCNC: 9.03 MG/DL — HIGH (ref 0.5–1.3)
EGFR: 10 ML/MIN/1.73M2 — LOW
EGFR: 10 ML/MIN/1.73M2 — LOW
EGFR: 5 ML/MIN/1.73M2 — LOW
EGFR: 5 ML/MIN/1.73M2 — LOW
EOSINOPHIL # BLD AUTO: 0.3 K/UL — SIGNIFICANT CHANGE UP (ref 0–0.5)
EOSINOPHIL NFR BLD AUTO: 2.9 % — SIGNIFICANT CHANGE UP (ref 0–6)
ESTIMATED AVERAGE GLUCOSE: 148 MG/DL — HIGH (ref 68–114)
GLUCOSE BLDC GLUCOMTR-MCNC: 181 MG/DL — HIGH (ref 70–99)
GLUCOSE BLDC GLUCOMTR-MCNC: 213 MG/DL — HIGH (ref 70–99)
GLUCOSE BLDC GLUCOMTR-MCNC: 219 MG/DL — HIGH (ref 70–99)
GLUCOSE BLDC GLUCOMTR-MCNC: 220 MG/DL — HIGH (ref 70–99)
GLUCOSE BLDC GLUCOMTR-MCNC: 252 MG/DL — HIGH (ref 70–99)
GLUCOSE SERPL-MCNC: 167 MG/DL — HIGH (ref 70–99)
GLUCOSE SERPL-MCNC: 180 MG/DL — HIGH (ref 70–99)
HBV SURFACE AG SER-ACNC: SIGNIFICANT CHANGE UP
HCT VFR BLD CALC: 29.5 % — LOW (ref 34.5–45)
HGB BLD-MCNC: 9.9 G/DL — LOW (ref 11.5–15.5)
IMM GRANULOCYTES NFR BLD AUTO: 0.6 % — SIGNIFICANT CHANGE UP (ref 0–0.9)
LYMPHOCYTES # BLD AUTO: 1.87 K/UL — SIGNIFICANT CHANGE UP (ref 1–3.3)
LYMPHOCYTES # BLD AUTO: 18.4 % — SIGNIFICANT CHANGE UP (ref 13–44)
MAGNESIUM SERPL-MCNC: 2.6 MG/DL — SIGNIFICANT CHANGE UP (ref 1.6–2.6)
MCHC RBC-ENTMCNC: 30.5 PG — SIGNIFICANT CHANGE UP (ref 27–34)
MCHC RBC-ENTMCNC: 33.6 GM/DL — SIGNIFICANT CHANGE UP (ref 32–36)
MCV RBC AUTO: 90.8 FL — SIGNIFICANT CHANGE UP (ref 80–100)
MONOCYTES # BLD AUTO: 0.6 K/UL — SIGNIFICANT CHANGE UP (ref 0–0.9)
MONOCYTES NFR BLD AUTO: 5.9 % — SIGNIFICANT CHANGE UP (ref 2–14)
NEUTROPHILS # BLD AUTO: 7.29 K/UL — SIGNIFICANT CHANGE UP (ref 1.8–7.4)
NEUTROPHILS NFR BLD AUTO: 71.7 % — SIGNIFICANT CHANGE UP (ref 43–77)
NRBC # BLD: 0 /100 WBCS — SIGNIFICANT CHANGE UP (ref 0–0)
NRBC BLD-RTO: 0 /100 WBCS — SIGNIFICANT CHANGE UP (ref 0–0)
PHOSPHATE SERPL-MCNC: 6.4 MG/DL — HIGH (ref 2.5–4.5)
PLATELET # BLD AUTO: 225 K/UL — SIGNIFICANT CHANGE UP (ref 150–400)
POTASSIUM SERPL-MCNC: 4.2 MMOL/L — SIGNIFICANT CHANGE UP (ref 3.5–5.3)
POTASSIUM SERPL-MCNC: 6.3 MMOL/L — CRITICAL HIGH (ref 3.5–5.3)
POTASSIUM SERPL-SCNC: 4.2 MMOL/L — SIGNIFICANT CHANGE UP (ref 3.5–5.3)
POTASSIUM SERPL-SCNC: 6.3 MMOL/L — CRITICAL HIGH (ref 3.5–5.3)
PROT SERPL-MCNC: 7.2 G/DL — SIGNIFICANT CHANGE UP (ref 6–8.3)
RBC # BLD: 3.25 M/UL — LOW (ref 3.8–5.2)
RBC # FLD: 13.1 % — SIGNIFICANT CHANGE UP (ref 10.3–14.5)
SODIUM SERPL-SCNC: 133 MMOL/L — LOW (ref 135–145)
SODIUM SERPL-SCNC: 134 MMOL/L — LOW (ref 135–145)
WBC # BLD: 10.17 K/UL — SIGNIFICANT CHANGE UP (ref 3.8–10.5)
WBC # FLD AUTO: 10.17 K/UL — SIGNIFICANT CHANGE UP (ref 3.8–10.5)

## 2024-10-10 PROCEDURE — 99232 SBSQ HOSP IP/OBS MODERATE 35: CPT | Mod: GC

## 2024-10-10 RX ORDER — SEVELAMER HYDROCHLORIDE 800 MG/1
1600 TABLET ORAL THREE TIMES A DAY
Refills: 0 | Status: DISCONTINUED | OUTPATIENT
Start: 2024-10-10 | End: 2024-10-12

## 2024-10-10 RX ORDER — CEFTRIAXONE 500 MG/1
1000 INJECTION, POWDER, FOR SOLUTION INTRAMUSCULAR; INTRAVENOUS ONCE
Refills: 0 | Status: COMPLETED | OUTPATIENT
Start: 2024-10-10 | End: 2024-10-10

## 2024-10-10 RX ORDER — METOCLOPRAMIDE HCL 10 MG
10 TABLET ORAL ONCE
Refills: 0 | Status: COMPLETED | OUTPATIENT
Start: 2024-10-10 | End: 2024-10-10

## 2024-10-10 RX ORDER — LABETALOL HYDROCHLORIDE 200 MG/1
10 TABLET, FILM COATED ORAL ONCE
Refills: 0 | Status: COMPLETED | OUTPATIENT
Start: 2024-10-10 | End: 2024-10-10

## 2024-10-10 RX ORDER — FERROUS SULFATE 137(45) MG
325 TABLET, EXTENDED RELEASE ORAL DAILY
Refills: 0 | Status: DISCONTINUED | OUTPATIENT
Start: 2024-10-10 | End: 2024-10-12

## 2024-10-10 RX ORDER — DEXTROSE 50 % IN WATER 50 %
50 SYRINGE (ML) INTRAVENOUS ONCE
Refills: 0 | Status: COMPLETED | OUTPATIENT
Start: 2024-10-10 | End: 2024-10-10

## 2024-10-10 RX ORDER — NIFEDIPINE 30 MG
60 TABLET, EXTENDED RELEASE 24 HR ORAL DAILY
Refills: 0 | Status: DISCONTINUED | OUTPATIENT
Start: 2024-10-10 | End: 2024-10-12

## 2024-10-10 RX ORDER — ACETAMINOPHEN 500 MG/5ML
650 LIQUID (ML) ORAL EVERY 6 HOURS
Refills: 0 | Status: DISCONTINUED | OUTPATIENT
Start: 2024-10-10 | End: 2024-10-12

## 2024-10-10 RX ORDER — AZITHROMYCIN 250 MG
500 CAPSULE ORAL ONCE
Refills: 0 | Status: COMPLETED | OUTPATIENT
Start: 2024-10-10 | End: 2024-10-10

## 2024-10-10 RX ORDER — AZITHROMYCIN 250 MG
CAPSULE ORAL
Refills: 0 | Status: DISCONTINUED | OUTPATIENT
Start: 2024-10-10 | End: 2024-10-11

## 2024-10-10 RX ORDER — ONDANSETRON HCL/PF 4 MG/2 ML
4 VIAL (ML) INJECTION EVERY 8 HOURS
Refills: 0 | Status: DISCONTINUED | OUTPATIENT
Start: 2024-10-10 | End: 2024-10-12

## 2024-10-10 RX ORDER — HEPARIN SODIUM 1000 [USP'U]/ML
5000 INJECTION INTRAVENOUS; SUBCUTANEOUS EVERY 8 HOURS
Refills: 0 | Status: DISCONTINUED | OUTPATIENT
Start: 2024-10-10 | End: 2024-10-10

## 2024-10-10 RX ORDER — CEFTRIAXONE 500 MG/1
1000 INJECTION, POWDER, FOR SOLUTION INTRAMUSCULAR; INTRAVENOUS EVERY 24 HOURS
Refills: 0 | Status: DISCONTINUED | OUTPATIENT
Start: 2024-10-11 | End: 2024-10-11

## 2024-10-10 RX ORDER — PREDNISONE 20 MG/1
10 TABLET ORAL DAILY
Refills: 0 | Status: DISCONTINUED | OUTPATIENT
Start: 2024-10-10 | End: 2024-10-12

## 2024-10-10 RX ORDER — AZITHROMYCIN 250 MG
500 CAPSULE ORAL EVERY 24 HOURS
Refills: 0 | Status: DISCONTINUED | OUTPATIENT
Start: 2024-10-11 | End: 2024-10-11

## 2024-10-10 RX ORDER — CEFTRIAXONE 500 MG/1
INJECTION, POWDER, FOR SOLUTION INTRAMUSCULAR; INTRAVENOUS
Refills: 0 | Status: DISCONTINUED | OUTPATIENT
Start: 2024-10-10 | End: 2024-10-11

## 2024-10-10 RX ORDER — GABAPENTIN 400 MG/1
300 CAPSULE ORAL
Refills: 0 | Status: DISCONTINUED | OUTPATIENT
Start: 2024-10-10 | End: 2024-10-12

## 2024-10-10 RX ORDER — INSULIN LISPRO 100 U/ML
INJECTION, SOLUTION INTRAVENOUS; SUBCUTANEOUS AT BEDTIME
Refills: 0 | Status: DISCONTINUED | OUTPATIENT
Start: 2024-10-10 | End: 2024-10-11

## 2024-10-10 RX ORDER — INSULIN LISPRO 100 U/ML
INJECTION, SOLUTION INTRAVENOUS; SUBCUTANEOUS
Refills: 0 | Status: DISCONTINUED | OUTPATIENT
Start: 2024-10-10 | End: 2024-10-11

## 2024-10-10 RX ORDER — METOPROLOL SUCCINATE 50 MG/1
25 TABLET, EXTENDED RELEASE ORAL DAILY
Refills: 0 | Status: DISCONTINUED | OUTPATIENT
Start: 2024-10-10 | End: 2024-10-12

## 2024-10-10 RX ORDER — NIFEDIPINE 30 MG
30 TABLET, EXTENDED RELEASE 24 HR ORAL DAILY
Refills: 0 | Status: DISCONTINUED | OUTPATIENT
Start: 2024-10-10 | End: 2024-10-10

## 2024-10-10 RX ORDER — ATORVASTATIN CALCIUM 80 MG/1
10 TABLET, FILM COATED ORAL AT BEDTIME
Refills: 0 | Status: DISCONTINUED | OUTPATIENT
Start: 2024-10-10 | End: 2024-10-12

## 2024-10-10 RX ADMIN — Medication 5 UNIT(S): at 09:14

## 2024-10-10 RX ADMIN — Medication 60 MILLIGRAM(S): at 03:27

## 2024-10-10 RX ADMIN — Medication 10 MILLIGRAM(S): at 00:10

## 2024-10-10 RX ADMIN — Medication 1000 MILLIGRAM(S): at 00:41

## 2024-10-10 RX ADMIN — Medication 400 MILLIGRAM(S): at 00:11

## 2024-10-10 RX ADMIN — SEVELAMER HYDROCHLORIDE 1600 MILLIGRAM(S): 800 TABLET ORAL at 07:01

## 2024-10-10 RX ADMIN — NITROGLYCERIN 1 INCH(S): 20 OINTMENT TOPICAL at 05:00

## 2024-10-10 RX ADMIN — SEVELAMER HYDROCHLORIDE 1600 MILLIGRAM(S): 800 TABLET ORAL at 17:36

## 2024-10-10 RX ADMIN — LABETALOL HYDROCHLORIDE 10 MILLIGRAM(S): 200 TABLET, FILM COATED ORAL at 02:32

## 2024-10-10 RX ADMIN — LABETALOL HYDROCHLORIDE 10 MILLIGRAM(S): 200 TABLET, FILM COATED ORAL at 04:17

## 2024-10-10 RX ADMIN — Medication 10 MILLIGRAM(S): at 04:31

## 2024-10-10 RX ADMIN — LABETALOL HYDROCHLORIDE 10 MILLIGRAM(S): 200 TABLET, FILM COATED ORAL at 07:00

## 2024-10-10 RX ADMIN — ATORVASTATIN CALCIUM 10 MILLIGRAM(S): 80 TABLET, FILM COATED ORAL at 21:43

## 2024-10-10 RX ADMIN — Medication 4 MILLIGRAM(S): at 17:36

## 2024-10-10 RX ADMIN — Medication 650 MILLIGRAM(S): at 09:14

## 2024-10-10 RX ADMIN — Medication 325 MILLIGRAM(S): at 17:35

## 2024-10-10 RX ADMIN — HEPARIN SODIUM 5000 UNIT(S): 1000 INJECTION INTRAVENOUS; SUBCUTANEOUS at 07:01

## 2024-10-10 RX ADMIN — GABAPENTIN 300 MILLIGRAM(S): 400 CAPSULE ORAL at 17:35

## 2024-10-10 RX ADMIN — PREDNISONE 10 MILLIGRAM(S): 20 TABLET ORAL at 07:01

## 2024-10-10 RX ADMIN — Medication 50 MILLILITER(S): at 09:07

## 2024-10-10 RX ADMIN — SEVELAMER HYDROCHLORIDE 1600 MILLIGRAM(S): 800 TABLET ORAL at 21:43

## 2024-10-10 RX ADMIN — Medication 255 MILLIGRAM(S): at 07:00

## 2024-10-10 RX ADMIN — GABAPENTIN 300 MILLIGRAM(S): 400 CAPSULE ORAL at 07:02

## 2024-10-10 RX ADMIN — CEFTRIAXONE 100 MILLIGRAM(S): 500 INJECTION, POWDER, FOR SOLUTION INTRAMUSCULAR; INTRAVENOUS at 05:37

## 2024-10-10 RX ADMIN — Medication 4 MILLIGRAM(S): at 09:14

## 2024-10-10 RX ADMIN — Medication 650 MILLIGRAM(S): at 16:23

## 2024-10-11 ENCOUNTER — TRANSCRIPTION ENCOUNTER (OUTPATIENT)
Age: 54
End: 2024-10-11

## 2024-10-11 DIAGNOSIS — N83.209 UNSPECIFIED OVARIAN CYST, UNSPECIFIED SIDE: ICD-10-CM

## 2024-10-11 LAB
A1C WITH ESTIMATED AVERAGE GLUCOSE RESULT: 6.9 % — HIGH (ref 4–5.6)
ALBUMIN SERPL ELPH-MCNC: 3.3 G/DL — LOW (ref 3.5–5)
ALP SERPL-CCNC: 56 U/L — SIGNIFICANT CHANGE UP (ref 40–120)
ALT FLD-CCNC: 23 U/L DA — SIGNIFICANT CHANGE UP (ref 10–60)
ANION GAP SERPL CALC-SCNC: 7 MMOL/L — SIGNIFICANT CHANGE UP (ref 5–17)
AST SERPL-CCNC: 17 U/L — SIGNIFICANT CHANGE UP (ref 10–40)
BASOPHILS # BLD AUTO: 0.03 K/UL — SIGNIFICANT CHANGE UP (ref 0–0.2)
BASOPHILS NFR BLD AUTO: 0.5 % — SIGNIFICANT CHANGE UP (ref 0–2)
BILIRUB SERPL-MCNC: 0.4 MG/DL — SIGNIFICANT CHANGE UP (ref 0.2–1.2)
BLD GP AB SCN SERPL QL: SIGNIFICANT CHANGE UP
BUN SERPL-MCNC: 28 MG/DL — HIGH (ref 7–18)
CALCIUM SERPL-MCNC: 8.9 MG/DL — SIGNIFICANT CHANGE UP (ref 8.4–10.5)
CHLORIDE SERPL-SCNC: 99 MMOL/L — SIGNIFICANT CHANGE UP (ref 96–108)
CO2 SERPL-SCNC: 29 MMOL/L — SIGNIFICANT CHANGE UP (ref 22–31)
CREAT SERPL-MCNC: 6.76 MG/DL — HIGH (ref 0.5–1.3)
EGFR: 7 ML/MIN/1.73M2 — LOW
EGFR: 7 ML/MIN/1.73M2 — LOW
EOSINOPHIL # BLD AUTO: 0.52 K/UL — HIGH (ref 0–0.5)
EOSINOPHIL NFR BLD AUTO: 7.8 % — HIGH (ref 0–6)
ESTIMATED AVERAGE GLUCOSE: 151 MG/DL — HIGH (ref 68–114)
GLUCOSE BLDC GLUCOMTR-MCNC: 199 MG/DL — HIGH (ref 70–99)
GLUCOSE BLDC GLUCOMTR-MCNC: 239 MG/DL — HIGH (ref 70–99)
GLUCOSE BLDC GLUCOMTR-MCNC: 239 MG/DL — HIGH (ref 70–99)
GLUCOSE BLDC GLUCOMTR-MCNC: 372 MG/DL — HIGH (ref 70–99)
GLUCOSE SERPL-MCNC: 188 MG/DL — HIGH (ref 70–99)
HCT VFR BLD CALC: 29.2 % — LOW (ref 34.5–45)
HGB BLD-MCNC: 9.6 G/DL — LOW (ref 11.5–15.5)
IMM GRANULOCYTES NFR BLD AUTO: 0.8 % — SIGNIFICANT CHANGE UP (ref 0–0.9)
LEGIONELLA AG UR QL: NEGATIVE — SIGNIFICANT CHANGE UP
LYMPHOCYTES # BLD AUTO: 2.05 K/UL — SIGNIFICANT CHANGE UP (ref 1–3.3)
LYMPHOCYTES # BLD AUTO: 30.9 % — SIGNIFICANT CHANGE UP (ref 13–44)
M PNEUMO IGM SER-ACNC: 0.71 INDEX — SIGNIFICANT CHANGE UP (ref 0–0.9)
MAGNESIUM SERPL-MCNC: 2.5 MG/DL — SIGNIFICANT CHANGE UP (ref 1.6–2.6)
MCHC RBC-ENTMCNC: 30.4 PG — SIGNIFICANT CHANGE UP (ref 27–34)
MCHC RBC-ENTMCNC: 32.9 GM/DL — SIGNIFICANT CHANGE UP (ref 32–36)
MCV RBC AUTO: 92.4 FL — SIGNIFICANT CHANGE UP (ref 80–100)
MONOCYTES # BLD AUTO: 0.66 K/UL — SIGNIFICANT CHANGE UP (ref 0–0.9)
MONOCYTES NFR BLD AUTO: 10 % — SIGNIFICANT CHANGE UP (ref 2–14)
MYCOPLASMA AG SPEC QL: NEGATIVE — SIGNIFICANT CHANGE UP
NEUTROPHILS # BLD AUTO: 3.32 K/UL — SIGNIFICANT CHANGE UP (ref 1.8–7.4)
NEUTROPHILS NFR BLD AUTO: 50 % — SIGNIFICANT CHANGE UP (ref 43–77)
NRBC # BLD: 0 /100 WBCS — SIGNIFICANT CHANGE UP (ref 0–0)
NRBC BLD-RTO: 0 /100 WBCS — SIGNIFICANT CHANGE UP (ref 0–0)
PHOSPHATE SERPL-MCNC: 6 MG/DL — HIGH (ref 2.5–4.5)
PLATELET # BLD AUTO: 225 K/UL — SIGNIFICANT CHANGE UP (ref 150–400)
POTASSIUM SERPL-MCNC: 4.8 MMOL/L — SIGNIFICANT CHANGE UP (ref 3.5–5.3)
POTASSIUM SERPL-SCNC: 4.8 MMOL/L — SIGNIFICANT CHANGE UP (ref 3.5–5.3)
PROT SERPL-MCNC: 7.1 G/DL — SIGNIFICANT CHANGE UP (ref 6–8.3)
RBC # BLD: 3.16 M/UL — LOW (ref 3.8–5.2)
RBC # FLD: 13 % — SIGNIFICANT CHANGE UP (ref 10.3–14.5)
SODIUM SERPL-SCNC: 135 MMOL/L — SIGNIFICANT CHANGE UP (ref 135–145)
TSH SERPL-MCNC: 2.08 UU/ML — SIGNIFICANT CHANGE UP (ref 0.34–4.82)
WBC # BLD: 6.63 K/UL — SIGNIFICANT CHANGE UP (ref 3.8–10.5)
WBC # FLD AUTO: 6.63 K/UL — SIGNIFICANT CHANGE UP (ref 3.8–10.5)

## 2024-10-11 PROCEDURE — 76830 TRANSVAGINAL US NON-OB: CPT | Mod: 26

## 2024-10-11 PROCEDURE — 99232 SBSQ HOSP IP/OBS MODERATE 35: CPT | Mod: GC

## 2024-10-11 PROCEDURE — 99222 1ST HOSP IP/OBS MODERATE 55: CPT

## 2024-10-11 PROCEDURE — 76856 US EXAM PELVIC COMPLETE: CPT | Mod: 26

## 2024-10-11 RX ORDER — INSULIN LISPRO 100 U/ML
INJECTION, SOLUTION INTRAVENOUS; SUBCUTANEOUS
Refills: 0 | Status: DISCONTINUED | OUTPATIENT
Start: 2024-10-11 | End: 2024-10-12

## 2024-10-11 RX ORDER — INSULIN GLARGINE-YFGN 100 [IU]/ML
5 INJECTION, SOLUTION SUBCUTANEOUS AT BEDTIME
Refills: 0 | Status: DISCONTINUED | OUTPATIENT
Start: 2024-10-11 | End: 2024-10-12

## 2024-10-11 RX ORDER — GABAPENTIN 400 MG/1
1 CAPSULE ORAL
Qty: 0 | Refills: 0 | DISCHARGE
Start: 2024-10-11

## 2024-10-11 RX ADMIN — INSULIN LISPRO 5: 100 INJECTION, SOLUTION INTRAVENOUS; SUBCUTANEOUS at 08:08

## 2024-10-11 RX ADMIN — INSULIN GLARGINE-YFGN 5 UNIT(S): 100 INJECTION, SOLUTION SUBCUTANEOUS at 21:29

## 2024-10-11 RX ADMIN — PREDNISONE 10 MILLIGRAM(S): 20 TABLET ORAL at 05:55

## 2024-10-11 RX ADMIN — Medication 255 MILLIGRAM(S): at 06:52

## 2024-10-11 RX ADMIN — SEVELAMER HYDROCHLORIDE 1600 MILLIGRAM(S): 800 TABLET ORAL at 05:55

## 2024-10-11 RX ADMIN — SEVELAMER HYDROCHLORIDE 1600 MILLIGRAM(S): 800 TABLET ORAL at 21:19

## 2024-10-11 RX ADMIN — Medication 60 MILLIGRAM(S): at 05:54

## 2024-10-11 RX ADMIN — ATORVASTATIN CALCIUM 10 MILLIGRAM(S): 80 TABLET, FILM COATED ORAL at 21:19

## 2024-10-11 RX ADMIN — GABAPENTIN 300 MILLIGRAM(S): 400 CAPSULE ORAL at 05:54

## 2024-10-11 RX ADMIN — METOPROLOL SUCCINATE 25 MILLIGRAM(S): 50 TABLET, EXTENDED RELEASE ORAL at 05:55

## 2024-10-11 RX ADMIN — CEFTRIAXONE 100 MILLIGRAM(S): 500 INJECTION, POWDER, FOR SOLUTION INTRAMUSCULAR; INTRAVENOUS at 05:54

## 2024-10-11 RX ADMIN — INSULIN LISPRO 4: 100 INJECTION, SOLUTION INTRAVENOUS; SUBCUTANEOUS at 21:28

## 2024-10-11 RX ADMIN — INSULIN LISPRO 2: 100 INJECTION, SOLUTION INTRAVENOUS; SUBCUTANEOUS at 17:21

## 2024-10-11 RX ADMIN — Medication 325 MILLIGRAM(S): at 11:35

## 2024-10-11 RX ADMIN — INSULIN LISPRO 1: 100 INJECTION, SOLUTION INTRAVENOUS; SUBCUTANEOUS at 11:45

## 2024-10-11 RX ADMIN — GABAPENTIN 300 MILLIGRAM(S): 400 CAPSULE ORAL at 17:22

## 2024-10-11 RX ADMIN — SEVELAMER HYDROCHLORIDE 1600 MILLIGRAM(S): 800 TABLET ORAL at 13:21

## 2024-10-12 ENCOUNTER — TRANSCRIPTION ENCOUNTER (OUTPATIENT)
Age: 54
End: 2024-10-12

## 2024-10-12 VITALS
TEMPERATURE: 98 F | RESPIRATION RATE: 18 BRPM | SYSTOLIC BLOOD PRESSURE: 132 MMHG | OXYGEN SATURATION: 100 % | WEIGHT: 108.25 LBS | DIASTOLIC BLOOD PRESSURE: 72 MMHG

## 2024-10-12 LAB
ALBUMIN SERPL ELPH-MCNC: 3.1 G/DL — LOW (ref 3.5–5)
ALP SERPL-CCNC: 60 U/L — SIGNIFICANT CHANGE UP (ref 40–120)
ALT FLD-CCNC: 21 U/L DA — SIGNIFICANT CHANGE UP (ref 10–60)
ANION GAP SERPL CALC-SCNC: 11 MMOL/L — SIGNIFICANT CHANGE UP (ref 5–17)
AST SERPL-CCNC: 16 U/L — SIGNIFICANT CHANGE UP (ref 10–40)
BILIRUB SERPL-MCNC: 0.3 MG/DL — SIGNIFICANT CHANGE UP (ref 0.2–1.2)
BUN SERPL-MCNC: 57 MG/DL — HIGH (ref 7–18)
CALCIUM SERPL-MCNC: 8 MG/DL — LOW (ref 8.4–10.5)
CHLORIDE SERPL-SCNC: 97 MMOL/L — SIGNIFICANT CHANGE UP (ref 96–108)
CO2 SERPL-SCNC: 29 MMOL/L — SIGNIFICANT CHANGE UP (ref 22–31)
CREAT SERPL-MCNC: 9.22 MG/DL — HIGH (ref 0.5–1.3)
EGFR: 5 ML/MIN/1.73M2 — LOW
EGFR: 5 ML/MIN/1.73M2 — LOW
GLUCOSE BLDC GLUCOMTR-MCNC: 117 MG/DL — HIGH (ref 70–99)
GLUCOSE BLDC GLUCOMTR-MCNC: 203 MG/DL — HIGH (ref 70–99)
GLUCOSE SERPL-MCNC: 157 MG/DL — HIGH (ref 70–99)
HBV CORE AB SER-ACNC: SIGNIFICANT CHANGE UP
HBV SURFACE AB SER-ACNC: SIGNIFICANT CHANGE UP
HCT VFR BLD CALC: 26 % — LOW (ref 34.5–45)
HGB BLD-MCNC: 8.7 G/DL — LOW (ref 11.5–15.5)
MAGNESIUM SERPL-MCNC: 2.9 MG/DL — HIGH (ref 1.6–2.6)
MCHC RBC-ENTMCNC: 30.5 PG — SIGNIFICANT CHANGE UP (ref 27–34)
MCHC RBC-ENTMCNC: 33.5 GM/DL — SIGNIFICANT CHANGE UP (ref 32–36)
MCV RBC AUTO: 91.2 FL — SIGNIFICANT CHANGE UP (ref 80–100)
MRSA PCR RESULT.: DETECTED
NRBC # BLD: 0 /100 WBCS — SIGNIFICANT CHANGE UP (ref 0–0)
NRBC BLD-RTO: 0 /100 WBCS — SIGNIFICANT CHANGE UP (ref 0–0)
PHOSPHATE SERPL-MCNC: 6.6 MG/DL — HIGH (ref 2.5–4.5)
PLATELET # BLD AUTO: 247 K/UL — SIGNIFICANT CHANGE UP (ref 150–400)
POTASSIUM SERPL-MCNC: 5.2 MMOL/L — SIGNIFICANT CHANGE UP (ref 3.5–5.3)
POTASSIUM SERPL-SCNC: 5.2 MMOL/L — SIGNIFICANT CHANGE UP (ref 3.5–5.3)
PROT SERPL-MCNC: 6.8 G/DL — SIGNIFICANT CHANGE UP (ref 6–8.3)
RBC # BLD: 2.85 M/UL — LOW (ref 3.8–5.2)
RBC # FLD: 12.7 % — SIGNIFICANT CHANGE UP (ref 10.3–14.5)
S AUREUS DNA NOSE QL NAA+PROBE: DETECTED
S PNEUM AG UR QL: NEGATIVE — SIGNIFICANT CHANGE UP
SODIUM SERPL-SCNC: 137 MMOL/L — SIGNIFICANT CHANGE UP (ref 135–145)
WBC # BLD: 9.05 K/UL — SIGNIFICANT CHANGE UP (ref 3.8–10.5)
WBC # FLD AUTO: 9.05 K/UL — SIGNIFICANT CHANGE UP (ref 3.8–10.5)

## 2024-10-12 PROCEDURE — 99239 HOSP IP/OBS DSCHRG MGMT >30: CPT | Mod: GC

## 2024-10-12 RX ORDER — EPOETIN ALFA 10000 [IU]/ML
4000 SOLUTION INTRAVENOUS; SUBCUTANEOUS
Refills: 0 | Status: DISCONTINUED | OUTPATIENT
Start: 2024-10-12 | End: 2024-10-12

## 2024-10-12 RX ADMIN — Medication 1 APPLICATION(S): at 06:25

## 2024-10-12 RX ADMIN — SEVELAMER HYDROCHLORIDE 1600 MILLIGRAM(S): 800 TABLET ORAL at 06:24

## 2024-10-12 RX ADMIN — GABAPENTIN 300 MILLIGRAM(S): 400 CAPSULE ORAL at 06:24

## 2024-10-12 RX ADMIN — Medication 60 MILLIGRAM(S): at 06:24

## 2024-10-12 RX ADMIN — PREDNISONE 10 MILLIGRAM(S): 20 TABLET ORAL at 06:24

## 2024-10-12 RX ADMIN — SEVELAMER HYDROCHLORIDE 1600 MILLIGRAM(S): 800 TABLET ORAL at 14:14

## 2024-10-12 RX ADMIN — INSULIN LISPRO 4: 100 INJECTION, SOLUTION INTRAVENOUS; SUBCUTANEOUS at 07:55

## 2024-10-12 RX ADMIN — EPOETIN ALFA 4000 UNIT(S): 10000 SOLUTION INTRAVENOUS; SUBCUTANEOUS at 13:15

## 2024-10-12 RX ADMIN — Medication 325 MILLIGRAM(S): at 14:14

## 2024-10-12 RX ADMIN — METOPROLOL SUCCINATE 25 MILLIGRAM(S): 50 TABLET, EXTENDED RELEASE ORAL at 06:24

## 2024-10-13 LAB
HBV SURFACE AG SER-ACNC: SIGNIFICANT CHANGE UP
HCV AB S/CO SERPL IA: 0.18 S/CO — SIGNIFICANT CHANGE UP (ref 0–0.99)
HCV AB SERPL-IMP: SIGNIFICANT CHANGE UP

## 2024-10-25 ENCOUNTER — APPOINTMENT (OUTPATIENT)
Dept: OBGYN | Facility: CLINIC | Age: 54
End: 2024-10-25
Payer: MEDICARE

## 2024-10-25 DIAGNOSIS — R92.30 DENSE BREASTS, UNSPECIFIED: ICD-10-CM

## 2024-10-25 DIAGNOSIS — Z01.419 ENCOUNTER FOR GYNECOLOGICAL EXAMINATION (GENERAL) (ROUTINE) W/OUT ABNORMAL FINDINGS: ICD-10-CM

## 2024-10-25 DIAGNOSIS — N95.0 POSTMENOPAUSAL BLEEDING: ICD-10-CM

## 2024-10-25 PROCEDURE — 99386 PREV VISIT NEW AGE 40-64: CPT

## 2024-10-28 ENCOUNTER — APPOINTMENT (OUTPATIENT)
Dept: PULMONOLOGY | Facility: CLINIC | Age: 54
End: 2024-10-28

## 2024-10-28 LAB — HPV HIGH+LOW RISK DNA PNL CVX: NOT DETECTED

## 2024-10-29 ENCOUNTER — NON-APPOINTMENT (OUTPATIENT)
Age: 54
End: 2024-10-29

## 2024-10-30 ENCOUNTER — INPATIENT (INPATIENT)
Facility: HOSPITAL | Age: 54
LOS: 2 days | Discharge: HOME | DRG: 682 | End: 2024-11-02
Attending: STUDENT IN AN ORGANIZED HEALTH CARE EDUCATION/TRAINING PROGRAM | Admitting: STUDENT IN AN ORGANIZED HEALTH CARE EDUCATION/TRAINING PROGRAM
Payer: MEDICARE

## 2024-10-30 VITALS
WEIGHT: 113.1 LBS | HEART RATE: 76 BPM | OXYGEN SATURATION: 100 % | RESPIRATION RATE: 17 BRPM | SYSTOLIC BLOOD PRESSURE: 214 MMHG | TEMPERATURE: 98 F | DIASTOLIC BLOOD PRESSURE: 131 MMHG

## 2024-10-30 DIAGNOSIS — E78.5 HYPERLIPIDEMIA, UNSPECIFIED: ICD-10-CM

## 2024-10-30 DIAGNOSIS — N18.6 END STAGE RENAL DISEASE: ICD-10-CM

## 2024-10-30 DIAGNOSIS — R10.9 UNSPECIFIED ABDOMINAL PAIN: ICD-10-CM

## 2024-10-30 DIAGNOSIS — E11.9 TYPE 2 DIABETES MELLITUS WITHOUT COMPLICATIONS: ICD-10-CM

## 2024-10-30 DIAGNOSIS — D86.9 SARCOIDOSIS, UNSPECIFIED: ICD-10-CM

## 2024-10-30 DIAGNOSIS — I16.1 HYPERTENSIVE EMERGENCY: ICD-10-CM

## 2024-10-30 DIAGNOSIS — Z29.9 ENCOUNTER FOR PROPHYLACTIC MEASURES, UNSPECIFIED: ICD-10-CM

## 2024-10-30 DIAGNOSIS — G40.909 EPILEPSY, UNSPECIFIED, NOT INTRACTABLE, WITHOUT STATUS EPILEPTICUS: ICD-10-CM

## 2024-10-30 LAB
ALBUMIN SERPL ELPH-MCNC: 2.5 G/DL — LOW (ref 3.5–5)
ALBUMIN SERPL ELPH-MCNC: 3.3 G/DL — LOW (ref 3.5–5)
ALP SERPL-CCNC: 48 U/L — SIGNIFICANT CHANGE UP (ref 40–120)
ALP SERPL-CCNC: 64 U/L — SIGNIFICANT CHANGE UP (ref 40–120)
ALT FLD-CCNC: 27 U/L DA — SIGNIFICANT CHANGE UP (ref 10–60)
ALT FLD-CCNC: 34 U/L DA — SIGNIFICANT CHANGE UP (ref 10–60)
ANION GAP SERPL CALC-SCNC: 10 MMOL/L — SIGNIFICANT CHANGE UP (ref 5–17)
ANION GAP SERPL CALC-SCNC: 17 MMOL/L — SIGNIFICANT CHANGE UP (ref 5–17)
APTT BLD: 23.9 SEC — LOW (ref 24.5–35.6)
AST SERPL-CCNC: 31 U/L — SIGNIFICANT CHANGE UP (ref 10–40)
AST SERPL-CCNC: 52 U/L — HIGH (ref 10–40)
BASOPHILS # BLD AUTO: 0 K/UL — SIGNIFICANT CHANGE UP (ref 0–0.2)
BASOPHILS NFR BLD AUTO: 0 % — SIGNIFICANT CHANGE UP (ref 0–2)
BILIRUB SERPL-MCNC: 0.8 MG/DL — SIGNIFICANT CHANGE UP (ref 0.2–1.2)
BILIRUB SERPL-MCNC: 1 MG/DL — SIGNIFICANT CHANGE UP (ref 0.2–1.2)
BUN SERPL-MCNC: 112 MG/DL — HIGH (ref 7–18)
BUN SERPL-MCNC: 51 MG/DL — HIGH (ref 7–18)
CALCIUM SERPL-MCNC: 7 MG/DL — LOW (ref 8.4–10.5)
CALCIUM SERPL-MCNC: 7.6 MG/DL — LOW (ref 8.4–10.5)
CHLORIDE SERPL-SCNC: 100 MMOL/L — SIGNIFICANT CHANGE UP (ref 96–108)
CHLORIDE SERPL-SCNC: 105 MMOL/L — SIGNIFICANT CHANGE UP (ref 96–108)
CO2 SERPL-SCNC: 18 MMOL/L — LOW (ref 22–31)
CO2 SERPL-SCNC: 26 MMOL/L — SIGNIFICANT CHANGE UP (ref 22–31)
CREAT SERPL-MCNC: 10.5 MG/DL — HIGH (ref 0.5–1.3)
CREAT SERPL-MCNC: 6.54 MG/DL — HIGH (ref 0.5–1.3)
EGFR: 4 ML/MIN/1.73M2 — LOW
EGFR: 4 ML/MIN/1.73M2 — LOW
EGFR: 7 ML/MIN/1.73M2 — LOW
EGFR: 7 ML/MIN/1.73M2 — LOW
EOSINOPHIL # BLD AUTO: 0.65 K/UL — HIGH (ref 0–0.5)
EOSINOPHIL NFR BLD AUTO: 3 % — SIGNIFICANT CHANGE UP (ref 0–6)
GLUCOSE BLDC GLUCOMTR-MCNC: 246 MG/DL — HIGH (ref 70–99)
GLUCOSE SERPL-MCNC: 261 MG/DL — HIGH (ref 70–99)
GLUCOSE SERPL-MCNC: 369 MG/DL — HIGH (ref 70–99)
HCT VFR BLD CALC: 42.7 % — SIGNIFICANT CHANGE UP (ref 34.5–45)
HCT VFR BLD CALC: 47.7 % — HIGH (ref 34.5–45)
HGB BLD-MCNC: 14.6 G/DL — SIGNIFICANT CHANGE UP (ref 11.5–15.5)
HGB BLD-MCNC: 16.3 G/DL — HIGH (ref 11.5–15.5)
INR BLD: 0.77 RATIO — LOW (ref 0.85–1.16)
LIDOCAIN IGE QN: 188 U/L — HIGH (ref 13–75)
LYMPHOCYTES # BLD AUTO: 1.52 K/UL — SIGNIFICANT CHANGE UP (ref 1–3.3)
LYMPHOCYTES # BLD AUTO: 7 % — LOW (ref 13–44)
MAGNESIUM SERPL-MCNC: 2.2 MG/DL — SIGNIFICANT CHANGE UP (ref 1.6–2.6)
MAGNESIUM SERPL-MCNC: 3.1 MG/DL — HIGH (ref 1.6–2.6)
MANUAL SMEAR VERIFICATION: SIGNIFICANT CHANGE UP
MCHC RBC-ENTMCNC: 30.8 PG — SIGNIFICANT CHANGE UP (ref 27–34)
MCHC RBC-ENTMCNC: 31.7 PG — SIGNIFICANT CHANGE UP (ref 27–34)
MCHC RBC-ENTMCNC: 34.2 G/DL — SIGNIFICANT CHANGE UP (ref 32–36)
MCHC RBC-ENTMCNC: 34.2 G/DL — SIGNIFICANT CHANGE UP (ref 32–36)
MCV RBC AUTO: 90 FL — SIGNIFICANT CHANGE UP (ref 80–100)
MCV RBC AUTO: 92.8 FL — SIGNIFICANT CHANGE UP (ref 80–100)
MONOCYTES # BLD AUTO: 2.83 K/UL — HIGH (ref 0–0.9)
MONOCYTES NFR BLD AUTO: 13 % — SIGNIFICANT CHANGE UP (ref 2–14)
NEUTROPHILS # BLD AUTO: 15 K/UL — HIGH (ref 1.8–7.4)
NEUTROPHILS NFR BLD AUTO: 69 % — SIGNIFICANT CHANGE UP (ref 43–77)
NRBC # BLD: 0 /100 WBCS — SIGNIFICANT CHANGE UP (ref 0–0)
NRBC # BLD: 0 /100 WBCS — SIGNIFICANT CHANGE UP (ref 0–0)
NRBC BLD-RTO: 0 /100 WBCS — SIGNIFICANT CHANGE UP (ref 0–0)
NRBC BLD-RTO: 0 /100 WBCS — SIGNIFICANT CHANGE UP (ref 0–0)
NT-PROBNP SERPL-SCNC: HIGH PG/ML (ref 0–125)
PHOSPHATE SERPL-MCNC: 4.9 MG/DL — HIGH (ref 2.5–4.5)
PLAT MORPH BLD: NORMAL — SIGNIFICANT CHANGE UP
PLATELET # BLD AUTO: 195 K/UL — SIGNIFICANT CHANGE UP (ref 150–400)
PLATELET # BLD AUTO: SIGNIFICANT CHANGE UP K/UL (ref 150–400)
PLATELET COUNT - ESTIMATE: NORMAL — SIGNIFICANT CHANGE UP
POTASSIUM SERPL-MCNC: 5.4 MMOL/L — HIGH (ref 3.5–5.3)
POTASSIUM SERPL-MCNC: 5.6 MMOL/L — HIGH (ref 3.5–5.3)
POTASSIUM SERPL-SCNC: 5.4 MMOL/L — HIGH (ref 3.5–5.3)
POTASSIUM SERPL-SCNC: 5.6 MMOL/L — HIGH (ref 3.5–5.3)
PROT SERPL-MCNC: 5.1 G/DL — LOW (ref 6–8.3)
PROT SERPL-MCNC: 6.4 G/DL — SIGNIFICANT CHANGE UP (ref 6–8.3)
PROTHROM AB SERPL-ACNC: 9 SEC — LOW (ref 9.9–13.4)
RBC # BLD: 4.6 M/UL — SIGNIFICANT CHANGE UP (ref 3.8–5.2)
RBC # BLD: 5.3 M/UL — HIGH (ref 3.8–5.2)
RBC # FLD: 14 % — SIGNIFICANT CHANGE UP (ref 10.3–14.5)
RBC # FLD: 14.1 % — SIGNIFICANT CHANGE UP (ref 10.3–14.5)
RBC BLD AUTO: NORMAL — SIGNIFICANT CHANGE UP
SODIUM SERPL-SCNC: 135 MMOL/L — SIGNIFICANT CHANGE UP (ref 135–145)
SODIUM SERPL-SCNC: 141 MMOL/L — SIGNIFICANT CHANGE UP (ref 135–145)
TROPONIN I, HIGH SENSITIVITY RESULT: 22.9 NG/L — SIGNIFICANT CHANGE UP
VARIANT LYMPHS # BLD: 8 % — HIGH (ref 0–6)
VARIANT LYMPHS NFR BLD MANUAL: 8 % — HIGH (ref 0–6)
WBC # BLD: 16.03 K/UL — HIGH (ref 3.8–10.5)
WBC # BLD: 21.74 K/UL — HIGH (ref 3.8–10.5)
WBC # FLD AUTO: 16.03 K/UL — HIGH (ref 3.8–10.5)
WBC # FLD AUTO: 21.74 K/UL — HIGH (ref 3.8–10.5)

## 2024-10-30 PROCEDURE — 71250 CT THORAX DX C-: CPT | Mod: 26,MC

## 2024-10-30 PROCEDURE — 76856 US EXAM PELVIC COMPLETE: CPT | Mod: 26

## 2024-10-30 PROCEDURE — 99223 1ST HOSP IP/OBS HIGH 75: CPT | Mod: GC

## 2024-10-30 PROCEDURE — 74176 CT ABD & PELVIS W/O CONTRAST: CPT | Mod: 26,MC

## 2024-10-30 PROCEDURE — 71045 X-RAY EXAM CHEST 1 VIEW: CPT | Mod: 26

## 2024-10-30 PROCEDURE — 99285 EMERGENCY DEPT VISIT HI MDM: CPT

## 2024-10-30 PROCEDURE — 76830 TRANSVAGINAL US NON-OB: CPT | Mod: 26

## 2024-10-30 RX ORDER — MAGNESIUM, ALUMINUM HYDROXIDE 200-200 MG
30 TABLET,CHEWABLE ORAL ONCE
Refills: 0 | Status: COMPLETED | OUTPATIENT
Start: 2024-10-30 | End: 2024-10-30

## 2024-10-30 RX ORDER — NIFEDIPINE 30 MG
90 TABLET, EXTENDED RELEASE 24 HR ORAL DAILY
Refills: 0 | Status: DISCONTINUED | OUTPATIENT
Start: 2024-10-30 | End: 2024-11-02

## 2024-10-30 RX ORDER — DEXTROSE 50 % IN WATER 50 %
15 SYRINGE (ML) INTRAVENOUS ONCE
Refills: 0 | Status: DISCONTINUED | OUTPATIENT
Start: 2024-10-30 | End: 2024-11-02

## 2024-10-30 RX ORDER — GABAPENTIN 400 MG/1
300 CAPSULE ORAL
Refills: 0 | Status: DISCONTINUED | OUTPATIENT
Start: 2024-10-30 | End: 2024-11-02

## 2024-10-30 RX ORDER — DEXTROSE 50 % IN WATER 50 %
50 SYRINGE (ML) INTRAVENOUS ONCE
Refills: 0 | Status: DISCONTINUED | OUTPATIENT
Start: 2024-10-30 | End: 2024-10-30

## 2024-10-30 RX ORDER — DEXTROSE 50 % IN WATER 50 %
25 SYRINGE (ML) INTRAVENOUS ONCE
Refills: 0 | Status: DISCONTINUED | OUTPATIENT
Start: 2024-10-30 | End: 2024-11-02

## 2024-10-30 RX ORDER — MAGNESIUM, ALUMINUM HYDROXIDE 200-200 MG
30 TABLET,CHEWABLE ORAL EVERY 4 HOURS
Refills: 0 | Status: DISCONTINUED | OUTPATIENT
Start: 2024-10-30 | End: 2024-11-02

## 2024-10-30 RX ORDER — SEVELAMER HYDROCHLORIDE 800 MG/1
1600 TABLET ORAL
Refills: 0 | Status: DISCONTINUED | OUTPATIENT
Start: 2024-10-30 | End: 2024-11-02

## 2024-10-30 RX ORDER — SODIUM ZIRCONIUM CYCLOSILICATE 5 G/5G
10 POWDER, FOR SUSPENSION ORAL ONCE
Refills: 0 | Status: COMPLETED | OUTPATIENT
Start: 2024-10-30 | End: 2024-10-30

## 2024-10-30 RX ORDER — METOPROLOL SUCCINATE 50 MG/1
25 TABLET, EXTENDED RELEASE ORAL DAILY
Refills: 0 | Status: DISCONTINUED | OUTPATIENT
Start: 2024-10-30 | End: 2024-11-02

## 2024-10-30 RX ORDER — SODIUM ZIRCONIUM CYCLOSILICATE 5 G/5G
10 POWDER, FOR SUSPENSION ORAL ONCE
Refills: 0 | Status: DISCONTINUED | OUTPATIENT
Start: 2024-10-30 | End: 2024-10-30

## 2024-10-30 RX ORDER — ATORVASTATIN CALCIUM 80 MG/1
10 TABLET, FILM COATED ORAL AT BEDTIME
Refills: 0 | Status: DISCONTINUED | OUTPATIENT
Start: 2024-10-30 | End: 2024-11-02

## 2024-10-30 RX ORDER — SEVELAMER HYDROCHLORIDE 800 MG/1
800 TABLET ORAL THREE TIMES A DAY
Refills: 0 | Status: DISCONTINUED | OUTPATIENT
Start: 2024-10-30 | End: 2024-10-30

## 2024-10-30 RX ORDER — ONDANSETRON HCL/PF 4 MG/2 ML
4 VIAL (ML) INJECTION EVERY 8 HOURS
Refills: 0 | Status: DISCONTINUED | OUTPATIENT
Start: 2024-10-30 | End: 2024-11-02

## 2024-10-30 RX ORDER — HYDROMORPHONE/SOD CHLOR,ISO/PF 2 MG/10 ML
0.2 SYRINGE (ML) INJECTION ONCE
Refills: 0 | Status: DISCONTINUED | OUTPATIENT
Start: 2024-10-30 | End: 2024-10-30

## 2024-10-30 RX ORDER — FERROUS SULFATE 137(45) MG
325 TABLET, EXTENDED RELEASE ORAL DAILY
Refills: 0 | Status: DISCONTINUED | OUTPATIENT
Start: 2024-10-30 | End: 2024-11-02

## 2024-10-30 RX ORDER — PREDNISONE 20 MG/1
10 TABLET ORAL DAILY
Refills: 0 | Status: DISCONTINUED | OUTPATIENT
Start: 2024-10-30 | End: 2024-11-02

## 2024-10-30 RX ORDER — ERGOCALCIFEROL 1.25 MG/1
50000 CAPSULE ORAL
Refills: 0 | Status: DISCONTINUED | OUTPATIENT
Start: 2024-10-30 | End: 2024-11-02

## 2024-10-30 RX ORDER — SODIUM CHLORIDE 9 G/1000ML
1000 INJECTION, SOLUTION INTRAVENOUS
Refills: 0 | Status: DISCONTINUED | OUTPATIENT
Start: 2024-10-30 | End: 2024-11-02

## 2024-10-30 RX ORDER — HEPARIN SODIUM 1000 [USP'U]/ML
5000 INJECTION INTRAVENOUS; SUBCUTANEOUS EVERY 8 HOURS
Refills: 0 | Status: DISCONTINUED | OUTPATIENT
Start: 2024-10-30 | End: 2024-10-31

## 2024-10-30 RX ORDER — INSULIN LISPRO 100 U/ML
INJECTION, SOLUTION INTRAVENOUS; SUBCUTANEOUS
Refills: 0 | Status: DISCONTINUED | OUTPATIENT
Start: 2024-10-30 | End: 2024-11-02

## 2024-10-30 RX ORDER — MELATONIN 5 MG
3 TABLET ORAL AT BEDTIME
Refills: 0 | Status: DISCONTINUED | OUTPATIENT
Start: 2024-10-30 | End: 2024-11-02

## 2024-10-30 RX ORDER — ACETAMINOPHEN 500 MG/5ML
650 LIQUID (ML) ORAL EVERY 6 HOURS
Refills: 0 | Status: DISCONTINUED | OUTPATIENT
Start: 2024-10-30 | End: 2024-11-02

## 2024-10-30 RX ORDER — GLUCAGON 3 MG/1
1 POWDER NASAL ONCE
Refills: 0 | Status: DISCONTINUED | OUTPATIENT
Start: 2024-10-30 | End: 2024-11-02

## 2024-10-30 RX ORDER — ONDANSETRON HCL/PF 4 MG/2 ML
4 VIAL (ML) INJECTION ONCE
Refills: 0 | Status: COMPLETED | OUTPATIENT
Start: 2024-10-30 | End: 2024-10-30

## 2024-10-30 RX ADMIN — HEPARIN SODIUM 5000 UNIT(S): 1000 INJECTION INTRAVENOUS; SUBCUTANEOUS at 22:45

## 2024-10-30 RX ADMIN — GABAPENTIN 300 MILLIGRAM(S): 400 CAPSULE ORAL at 22:44

## 2024-10-30 RX ADMIN — Medication 4 MILLIGRAM(S): at 13:52

## 2024-10-30 RX ADMIN — Medication 20 MILLIGRAM(S): at 13:22

## 2024-10-30 RX ADMIN — Medication 0.2 MILLIGRAM(S): at 23:05

## 2024-10-30 RX ADMIN — Medication 4 MILLIGRAM(S): at 13:22

## 2024-10-30 RX ADMIN — Medication 30 MILLILITER(S): at 13:22

## 2024-10-30 RX ADMIN — SODIUM ZIRCONIUM CYCLOSILICATE 10 GRAM(S): 5 POWDER, FOR SUSPENSION ORAL at 22:45

## 2024-10-30 RX ADMIN — SEVELAMER HYDROCHLORIDE 1600 MILLIGRAM(S): 800 TABLET ORAL at 22:46

## 2024-10-30 RX ADMIN — ATORVASTATIN CALCIUM 10 MILLIGRAM(S): 80 TABLET, FILM COATED ORAL at 22:44

## 2024-10-30 RX ADMIN — INSULIN LISPRO 2: 100 INJECTION, SOLUTION INTRAVENOUS; SUBCUTANEOUS at 22:46

## 2024-10-31 DIAGNOSIS — D69.6 THROMBOCYTOPENIA, UNSPECIFIED: ICD-10-CM

## 2024-10-31 LAB
ALBUMIN SERPL ELPH-MCNC: 2.3 G/DL — LOW (ref 3.5–5)
ALP SERPL-CCNC: 34 U/L — LOW (ref 40–120)
ALT FLD-CCNC: 23 U/L DA — SIGNIFICANT CHANGE UP (ref 10–60)
ANION GAP SERPL CALC-SCNC: 12 MMOL/L — SIGNIFICANT CHANGE UP (ref 5–17)
AST SERPL-CCNC: 22 U/L — SIGNIFICANT CHANGE UP (ref 10–40)
BASOPHILS # BLD AUTO: 0.03 K/UL — SIGNIFICANT CHANGE UP (ref 0–0.2)
BASOPHILS NFR BLD AUTO: 0.3 % — SIGNIFICANT CHANGE UP (ref 0–2)
BILIRUB SERPL-MCNC: 0.4 MG/DL — SIGNIFICANT CHANGE UP (ref 0.2–1.2)
BUN SERPL-MCNC: 63 MG/DL — HIGH (ref 7–18)
C DIFF GDH STL QL: NEGATIVE — SIGNIFICANT CHANGE UP
C DIFF GDH STL QL: SIGNIFICANT CHANGE UP
CALCIUM SERPL-MCNC: 7.2 MG/DL — LOW (ref 8.4–10.5)
CHLORIDE SERPL-SCNC: 103 MMOL/L — SIGNIFICANT CHANGE UP (ref 96–108)
CLOSURE TME COLL+EPINEP BLD: 90 K/UL — LOW (ref 150–400)
CO2 SERPL-SCNC: 26 MMOL/L — SIGNIFICANT CHANGE UP (ref 22–31)
CREAT SERPL-MCNC: 7.58 MG/DL — HIGH (ref 0.5–1.3)
CYTOLOGY CVX/VAG DOC THIN PREP: NORMAL
EGFR: 6 ML/MIN/1.73M2 — LOW
EGFR: 6 ML/MIN/1.73M2 — LOW
EOSINOPHIL # BLD AUTO: 0.07 K/UL — SIGNIFICANT CHANGE UP (ref 0–0.5)
EOSINOPHIL NFR BLD AUTO: 0.6 % — SIGNIFICANT CHANGE UP (ref 0–6)
GLUCOSE BLDC GLUCOMTR-MCNC: 165 MG/DL — HIGH (ref 70–99)
GLUCOSE BLDC GLUCOMTR-MCNC: 189 MG/DL — HIGH (ref 70–99)
GLUCOSE BLDC GLUCOMTR-MCNC: 273 MG/DL — HIGH (ref 70–99)
GLUCOSE BLDC GLUCOMTR-MCNC: 339 MG/DL — HIGH (ref 70–99)
GLUCOSE SERPL-MCNC: 258 MG/DL — HIGH (ref 70–99)
HCT VFR BLD CALC: 33.9 % — LOW (ref 34.5–45)
HEPARIN-PF4 AB RESULT: <0.6 U/ML — SIGNIFICANT CHANGE UP (ref 0–0.9)
HGB BLD-MCNC: 11.5 G/DL — SIGNIFICANT CHANGE UP (ref 11.5–15.5)
IMM GRANULOCYTES NFR BLD AUTO: 0.5 % — SIGNIFICANT CHANGE UP (ref 0–0.9)
LYMPHOCYTES # BLD AUTO: 0.99 K/UL — LOW (ref 1–3.3)
LYMPHOCYTES # BLD AUTO: 8.9 % — LOW (ref 13–44)
MAGNESIUM SERPL-MCNC: 2.6 MG/DL — SIGNIFICANT CHANGE UP (ref 1.6–2.6)
MCHC RBC-ENTMCNC: 31.2 PG — SIGNIFICANT CHANGE UP (ref 27–34)
MCHC RBC-ENTMCNC: 33.9 G/DL — SIGNIFICANT CHANGE UP (ref 32–36)
MCV RBC AUTO: 91.9 FL — SIGNIFICANT CHANGE UP (ref 80–100)
MONOCYTES # BLD AUTO: 0.59 K/UL — SIGNIFICANT CHANGE UP (ref 0–0.9)
MONOCYTES NFR BLD AUTO: 5.3 % — SIGNIFICANT CHANGE UP (ref 2–14)
NEUTROPHILS # BLD AUTO: 9.36 K/UL — HIGH (ref 1.8–7.4)
NEUTROPHILS NFR BLD AUTO: 84.4 % — HIGH (ref 43–77)
NRBC # BLD: 0 /100 WBCS — SIGNIFICANT CHANGE UP (ref 0–0)
NRBC BLD-RTO: 0 /100 WBCS — SIGNIFICANT CHANGE UP (ref 0–0)
PF4 HEPARIN CMPLX AB SER-ACNC: NEGATIVE — SIGNIFICANT CHANGE UP
PHOSPHATE SERPL-MCNC: 7 MG/DL — HIGH (ref 2.5–4.5)
PLATELET # BLD AUTO: 83 K/UL — LOW (ref 150–400)
POTASSIUM SERPL-MCNC: 5.5 MMOL/L — HIGH (ref 3.5–5.3)
POTASSIUM SERPL-SCNC: 5.5 MMOL/L — HIGH (ref 3.5–5.3)
PROT SERPL-MCNC: 4.8 G/DL — LOW (ref 6–8.3)
RBC # BLD: 3.69 M/UL — LOW (ref 3.8–5.2)
RBC # FLD: 14.4 % — SIGNIFICANT CHANGE UP (ref 10.3–14.5)
SODIUM SERPL-SCNC: 141 MMOL/L — SIGNIFICANT CHANGE UP (ref 135–145)
WBC # BLD: 11.1 K/UL — HIGH (ref 3.8–10.5)
WBC # FLD AUTO: 11.1 K/UL — HIGH (ref 3.8–10.5)

## 2024-10-31 PROCEDURE — 99233 SBSQ HOSP IP/OBS HIGH 50: CPT | Mod: GC

## 2024-10-31 RX ORDER — SENNA 187 MG
2 TABLET ORAL AT BEDTIME
Refills: 0 | Status: DISCONTINUED | OUTPATIENT
Start: 2024-10-31 | End: 2024-10-31

## 2024-10-31 RX ORDER — ACETAMINOPHEN 500 MG/5ML
1000 LIQUID (ML) ORAL EVERY 8 HOURS
Refills: 0 | Status: DISCONTINUED | OUTPATIENT
Start: 2024-10-31 | End: 2024-11-02

## 2024-10-31 RX ORDER — HYDROMORPHONE/SOD CHLOR,ISO/PF 2 MG/10 ML
0.2 SYRINGE (ML) INJECTION ONCE
Refills: 0 | Status: DISCONTINUED | OUTPATIENT
Start: 2024-10-31 | End: 2024-10-31

## 2024-10-31 RX ORDER — SODIUM ZIRCONIUM CYCLOSILICATE 5 G/5G
10 POWDER, FOR SUSPENSION ORAL EVERY 8 HOURS
Refills: 0 | Status: COMPLETED | OUTPATIENT
Start: 2024-10-31 | End: 2024-11-02

## 2024-10-31 RX ORDER — POLYETHYLENE GLYCOL 3350 17 G/17G
17 POWDER, FOR SOLUTION ORAL DAILY
Refills: 0 | Status: DISCONTINUED | OUTPATIENT
Start: 2024-10-31 | End: 2024-10-31

## 2024-10-31 RX ORDER — HYDROMORPHONE/SOD CHLOR,ISO/PF 2 MG/10 ML
0.2 SYRINGE (ML) INJECTION EVERY 4 HOURS
Refills: 0 | Status: DISCONTINUED | OUTPATIENT
Start: 2024-10-31 | End: 2024-11-02

## 2024-10-31 RX ORDER — NALOXONE HYDROCHLORIDE 0.4 MG/ML
0.4 INJECTION, SOLUTION INTRAMUSCULAR; INTRAVENOUS; SUBCUTANEOUS ONCE
Refills: 0 | Status: DISCONTINUED | OUTPATIENT
Start: 2024-10-31 | End: 2024-11-02

## 2024-10-31 RX ORDER — HYDROMORPHONE/SOD CHLOR,ISO/PF 2 MG/10 ML
0.5 SYRINGE (ML) INJECTION EVERY 4 HOURS
Refills: 0 | Status: DISCONTINUED | OUTPATIENT
Start: 2024-10-31 | End: 2024-11-02

## 2024-10-31 RX ORDER — BISACODYL 5 MG
5 TABLET, DELAYED RELEASE (ENTERIC COATED) ORAL DAILY
Refills: 0 | Status: DISCONTINUED | OUTPATIENT
Start: 2024-10-31 | End: 2024-11-02

## 2024-10-31 RX ADMIN — ERGOCALCIFEROL 50000 UNIT(S): 1.25 CAPSULE ORAL at 12:19

## 2024-10-31 RX ADMIN — Medication 0.5 MILLIGRAM(S): at 10:23

## 2024-10-31 RX ADMIN — Medication 0.2 MILLIGRAM(S): at 00:05

## 2024-10-31 RX ADMIN — GABAPENTIN 300 MILLIGRAM(S): 400 CAPSULE ORAL at 05:35

## 2024-10-31 RX ADMIN — SODIUM ZIRCONIUM CYCLOSILICATE 10 GRAM(S): 5 POWDER, FOR SUSPENSION ORAL at 21:55

## 2024-10-31 RX ADMIN — SEVELAMER HYDROCHLORIDE 1600 MILLIGRAM(S): 800 TABLET ORAL at 17:20

## 2024-10-31 RX ADMIN — SODIUM ZIRCONIUM CYCLOSILICATE 10 GRAM(S): 5 POWDER, FOR SUSPENSION ORAL at 14:15

## 2024-10-31 RX ADMIN — Medication 1000 MILLIGRAM(S): at 15:55

## 2024-10-31 RX ADMIN — SEVELAMER HYDROCHLORIDE 1600 MILLIGRAM(S): 800 TABLET ORAL at 12:05

## 2024-10-31 RX ADMIN — SEVELAMER HYDROCHLORIDE 1600 MILLIGRAM(S): 800 TABLET ORAL at 05:35

## 2024-10-31 RX ADMIN — INSULIN LISPRO 1: 100 INJECTION, SOLUTION INTRAVENOUS; SUBCUTANEOUS at 08:24

## 2024-10-31 RX ADMIN — Medication 1000 MILLIGRAM(S): at 14:17

## 2024-10-31 RX ADMIN — INSULIN LISPRO 4: 100 INJECTION, SOLUTION INTRAVENOUS; SUBCUTANEOUS at 17:21

## 2024-10-31 RX ADMIN — INSULIN LISPRO 1: 100 INJECTION, SOLUTION INTRAVENOUS; SUBCUTANEOUS at 12:04

## 2024-10-31 RX ADMIN — Medication 0.2 MILLIGRAM(S): at 05:27

## 2024-10-31 RX ADMIN — PREDNISONE 10 MILLIGRAM(S): 20 TABLET ORAL at 05:37

## 2024-10-31 RX ADMIN — GABAPENTIN 300 MILLIGRAM(S): 400 CAPSULE ORAL at 17:20

## 2024-10-31 RX ADMIN — Medication 1000 MILLIGRAM(S): at 21:55

## 2024-10-31 RX ADMIN — ATORVASTATIN CALCIUM 10 MILLIGRAM(S): 80 TABLET, FILM COATED ORAL at 21:55

## 2024-10-31 RX ADMIN — Medication 90 MILLIGRAM(S): at 05:37

## 2024-10-31 RX ADMIN — INSULIN LISPRO 3: 100 INJECTION, SOLUTION INTRAVENOUS; SUBCUTANEOUS at 21:55

## 2024-10-31 RX ADMIN — Medication 1000 MILLIGRAM(S): at 22:06

## 2024-10-31 RX ADMIN — Medication 325 MILLIGRAM(S): at 12:05

## 2024-10-31 RX ADMIN — Medication 0.2 MILLIGRAM(S): at 05:57

## 2024-10-31 RX ADMIN — METOPROLOL SUCCINATE 25 MILLIGRAM(S): 50 TABLET, EXTENDED RELEASE ORAL at 05:37

## 2024-10-31 RX ADMIN — Medication 0.5 MILLIGRAM(S): at 11:23

## 2024-10-31 RX ADMIN — HEPARIN SODIUM 5000 UNIT(S): 1000 INJECTION INTRAVENOUS; SUBCUTANEOUS at 05:35

## 2024-11-01 DIAGNOSIS — N93.9 ABNORMAL UTERINE AND VAGINAL BLEEDING, UNSPECIFIED: ICD-10-CM

## 2024-11-01 DIAGNOSIS — N95.0 POSTMENOPAUSAL BLEEDING: ICD-10-CM

## 2024-11-01 LAB
ALBUMIN SERPL ELPH-MCNC: 2.6 G/DL — LOW (ref 3.5–5)
ALP SERPL-CCNC: 43 U/L — SIGNIFICANT CHANGE UP (ref 40–120)
ALT FLD-CCNC: 24 U/L DA — SIGNIFICANT CHANGE UP (ref 10–60)
ANION GAP SERPL CALC-SCNC: 15 MMOL/L — SIGNIFICANT CHANGE UP (ref 5–17)
AST SERPL-CCNC: 17 U/L — SIGNIFICANT CHANGE UP (ref 10–40)
BILIRUB SERPL-MCNC: 0.3 MG/DL — SIGNIFICANT CHANGE UP (ref 0.2–1.2)
BLD GP AB SCN SERPL QL: SIGNIFICANT CHANGE UP
BUN SERPL-MCNC: 80 MG/DL — HIGH (ref 7–18)
CALCIUM SERPL-MCNC: 7.4 MG/DL — LOW (ref 8.4–10.5)
CHLORIDE SERPL-SCNC: 98 MMOL/L — SIGNIFICANT CHANGE UP (ref 96–108)
CO2 SERPL-SCNC: 24 MMOL/L — SIGNIFICANT CHANGE UP (ref 22–31)
CREAT SERPL-MCNC: 8.78 MG/DL — HIGH (ref 0.5–1.3)
EGFR: 5 ML/MIN/1.73M2 — LOW
EGFR: 5 ML/MIN/1.73M2 — LOW
GI PCR PANEL: SIGNIFICANT CHANGE UP
GLUCOSE BLDC GLUCOMTR-MCNC: 141 MG/DL — HIGH (ref 70–99)
GLUCOSE BLDC GLUCOMTR-MCNC: 168 MG/DL — HIGH (ref 70–99)
GLUCOSE BLDC GLUCOMTR-MCNC: 181 MG/DL — HIGH (ref 70–99)
GLUCOSE BLDC GLUCOMTR-MCNC: 216 MG/DL — HIGH (ref 70–99)
GLUCOSE BLDC GLUCOMTR-MCNC: 274 MG/DL — HIGH (ref 70–99)
GLUCOSE BLDC GLUCOMTR-MCNC: 283 MG/DL — HIGH (ref 70–99)
GLUCOSE SERPL-MCNC: 177 MG/DL — HIGH (ref 70–99)
HCT VFR BLD CALC: 31.1 % — LOW (ref 34.5–45)
HGB BLD-MCNC: 10.5 G/DL — LOW (ref 11.5–15.5)
MAGNESIUM SERPL-MCNC: 3 MG/DL — HIGH (ref 1.6–2.6)
MCHC RBC-ENTMCNC: 31.4 PG — SIGNIFICANT CHANGE UP (ref 27–34)
MCHC RBC-ENTMCNC: 33.8 G/DL — SIGNIFICANT CHANGE UP (ref 32–36)
MCV RBC AUTO: 93.1 FL — SIGNIFICANT CHANGE UP (ref 80–100)
NRBC # BLD: 0 /100 WBCS — SIGNIFICANT CHANGE UP (ref 0–0)
NRBC BLD-RTO: 0 /100 WBCS — SIGNIFICANT CHANGE UP (ref 0–0)
PHOSPHATE SERPL-MCNC: 7.9 MG/DL — HIGH (ref 2.5–4.5)
PLATELET # BLD AUTO: 116 K/UL — LOW (ref 150–400)
POTASSIUM SERPL-MCNC: 5.1 MMOL/L — SIGNIFICANT CHANGE UP (ref 3.5–5.3)
POTASSIUM SERPL-SCNC: 5.1 MMOL/L — SIGNIFICANT CHANGE UP (ref 3.5–5.3)
PROT SERPL-MCNC: 5.5 G/DL — LOW (ref 6–8.3)
RBC # BLD: 3.34 M/UL — LOW (ref 3.8–5.2)
RBC # FLD: 13.6 % — SIGNIFICANT CHANGE UP (ref 10.3–14.5)
SODIUM SERPL-SCNC: 137 MMOL/L — SIGNIFICANT CHANGE UP (ref 135–145)
WBC # BLD: 7.24 K/UL — SIGNIFICANT CHANGE UP (ref 3.8–10.5)
WBC # FLD AUTO: 7.24 K/UL — SIGNIFICANT CHANGE UP (ref 3.8–10.5)

## 2024-11-01 PROCEDURE — 99233 SBSQ HOSP IP/OBS HIGH 50: CPT | Mod: GC

## 2024-11-01 RX ORDER — INSULIN GLARGINE-YFGN 100 [IU]/ML
5 INJECTION, SOLUTION SUBCUTANEOUS AT BEDTIME
Refills: 0 | Status: DISCONTINUED | OUTPATIENT
Start: 2024-11-01 | End: 2024-11-02

## 2024-11-01 RX ADMIN — Medication 1000 MILLIGRAM(S): at 13:32

## 2024-11-01 RX ADMIN — SODIUM ZIRCONIUM CYCLOSILICATE 10 GRAM(S): 5 POWDER, FOR SUSPENSION ORAL at 05:37

## 2024-11-01 RX ADMIN — Medication 0.5 MILLIGRAM(S): at 09:40

## 2024-11-01 RX ADMIN — GABAPENTIN 300 MILLIGRAM(S): 400 CAPSULE ORAL at 17:07

## 2024-11-01 RX ADMIN — SEVELAMER HYDROCHLORIDE 1600 MILLIGRAM(S): 800 TABLET ORAL at 13:32

## 2024-11-01 RX ADMIN — SEVELAMER HYDROCHLORIDE 1600 MILLIGRAM(S): 800 TABLET ORAL at 17:07

## 2024-11-01 RX ADMIN — INSULIN LISPRO 2: 100 INJECTION, SOLUTION INTRAVENOUS; SUBCUTANEOUS at 08:31

## 2024-11-01 RX ADMIN — Medication 1000 MILLIGRAM(S): at 21:59

## 2024-11-01 RX ADMIN — Medication 20 MILLIGRAM(S): at 17:04

## 2024-11-01 RX ADMIN — SODIUM ZIRCONIUM CYCLOSILICATE 10 GRAM(S): 5 POWDER, FOR SUSPENSION ORAL at 21:58

## 2024-11-01 RX ADMIN — Medication 4 MILLIGRAM(S): at 21:57

## 2024-11-01 RX ADMIN — Medication 1000 MILLIGRAM(S): at 22:29

## 2024-11-01 RX ADMIN — SEVELAMER HYDROCHLORIDE 1600 MILLIGRAM(S): 800 TABLET ORAL at 08:30

## 2024-11-01 RX ADMIN — SODIUM ZIRCONIUM CYCLOSILICATE 10 GRAM(S): 5 POWDER, FOR SUSPENSION ORAL at 13:32

## 2024-11-01 RX ADMIN — Medication 1000 MILLIGRAM(S): at 14:41

## 2024-11-01 RX ADMIN — Medication 1000 MILLIGRAM(S): at 06:13

## 2024-11-01 RX ADMIN — INSULIN LISPRO 3: 100 INJECTION, SOLUTION INTRAVENOUS; SUBCUTANEOUS at 17:04

## 2024-11-01 RX ADMIN — METOPROLOL SUCCINATE 25 MILLIGRAM(S): 50 TABLET, EXTENDED RELEASE ORAL at 05:37

## 2024-11-01 RX ADMIN — Medication 325 MILLIGRAM(S): at 13:47

## 2024-11-01 RX ADMIN — Medication 0.5 MILLIGRAM(S): at 09:22

## 2024-11-01 RX ADMIN — Medication 90 MILLIGRAM(S): at 05:36

## 2024-11-01 RX ADMIN — GABAPENTIN 300 MILLIGRAM(S): 400 CAPSULE ORAL at 05:37

## 2024-11-01 RX ADMIN — Medication 1000 MILLIGRAM(S): at 05:36

## 2024-11-01 RX ADMIN — Medication 30 MILLILITER(S): at 18:55

## 2024-11-01 RX ADMIN — PREDNISONE 10 MILLIGRAM(S): 20 TABLET ORAL at 05:36

## 2024-11-01 RX ADMIN — ATORVASTATIN CALCIUM 10 MILLIGRAM(S): 80 TABLET, FILM COATED ORAL at 21:58

## 2024-11-01 RX ADMIN — INSULIN LISPRO 3: 100 INJECTION, SOLUTION INTRAVENOUS; SUBCUTANEOUS at 13:46

## 2024-11-01 RX ADMIN — INSULIN GLARGINE-YFGN 5 UNIT(S): 100 INJECTION, SOLUTION SUBCUTANEOUS at 21:57

## 2024-11-02 ENCOUNTER — TRANSCRIPTION ENCOUNTER (OUTPATIENT)
Age: 54
End: 2024-11-02

## 2024-11-02 VITALS
DIASTOLIC BLOOD PRESSURE: 79 MMHG | OXYGEN SATURATION: 100 % | HEART RATE: 79 BPM | WEIGHT: 111.11 LBS | TEMPERATURE: 97 F | RESPIRATION RATE: 16 BRPM | SYSTOLIC BLOOD PRESSURE: 156 MMHG

## 2024-11-02 LAB
ALBUMIN SERPL ELPH-MCNC: 2.7 G/DL — LOW (ref 3.5–5)
ALP SERPL-CCNC: 46 U/L — SIGNIFICANT CHANGE UP (ref 40–120)
ALT FLD-CCNC: 23 U/L DA — SIGNIFICANT CHANGE UP (ref 10–60)
ANION GAP SERPL CALC-SCNC: 10 MMOL/L — SIGNIFICANT CHANGE UP (ref 5–17)
AST SERPL-CCNC: 14 U/L — SIGNIFICANT CHANGE UP (ref 10–40)
BILIRUB SERPL-MCNC: 0.2 MG/DL — SIGNIFICANT CHANGE UP (ref 0.2–1.2)
BUN SERPL-MCNC: 42 MG/DL — HIGH (ref 7–18)
CALCIUM SERPL-MCNC: 7.3 MG/DL — LOW (ref 8.4–10.5)
CHLORIDE SERPL-SCNC: 98 MMOL/L — SIGNIFICANT CHANGE UP (ref 96–108)
CO2 SERPL-SCNC: 28 MMOL/L — SIGNIFICANT CHANGE UP (ref 22–31)
CREAT SERPL-MCNC: 5.65 MG/DL — HIGH (ref 0.5–1.3)
CULTURE RESULTS: SIGNIFICANT CHANGE UP
EGFR: 8 ML/MIN/1.73M2 — LOW
EGFR: 8 ML/MIN/1.73M2 — LOW
GLUCOSE BLDC GLUCOMTR-MCNC: 201 MG/DL — HIGH (ref 70–99)
GLUCOSE BLDC GLUCOMTR-MCNC: 208 MG/DL — HIGH (ref 70–99)
GLUCOSE SERPL-MCNC: 209 MG/DL — HIGH (ref 70–99)
HCT VFR BLD CALC: 30.9 % — LOW (ref 34.5–45)
HGB BLD-MCNC: 10.5 G/DL — LOW (ref 11.5–15.5)
MAGNESIUM SERPL-MCNC: 2.8 MG/DL — HIGH (ref 1.6–2.6)
MCHC RBC-ENTMCNC: 31.2 PG — SIGNIFICANT CHANGE UP (ref 27–34)
MCHC RBC-ENTMCNC: 34 G/DL — SIGNIFICANT CHANGE UP (ref 32–36)
MCV RBC AUTO: 91.7 FL — SIGNIFICANT CHANGE UP (ref 80–100)
NRBC # BLD: 0 /100 WBCS — SIGNIFICANT CHANGE UP (ref 0–0)
NRBC BLD-RTO: 0 /100 WBCS — SIGNIFICANT CHANGE UP (ref 0–0)
PHOSPHATE SERPL-MCNC: 5.4 MG/DL — HIGH (ref 2.5–4.5)
PLATELET # BLD AUTO: 142 K/UL — LOW (ref 150–400)
POTASSIUM SERPL-MCNC: 4.1 MMOL/L — SIGNIFICANT CHANGE UP (ref 3.5–5.3)
POTASSIUM SERPL-SCNC: 4.1 MMOL/L — SIGNIFICANT CHANGE UP (ref 3.5–5.3)
PROT SERPL-MCNC: 5.6 G/DL — LOW (ref 6–8.3)
RBC # BLD: 3.37 M/UL — LOW (ref 3.8–5.2)
RBC # FLD: 13.3 % — SIGNIFICANT CHANGE UP (ref 10.3–14.5)
SODIUM SERPL-SCNC: 136 MMOL/L — SIGNIFICANT CHANGE UP (ref 135–145)
SPECIMEN SOURCE: SIGNIFICANT CHANGE UP
WBC # BLD: 7.31 K/UL — SIGNIFICANT CHANGE UP (ref 3.8–10.5)
WBC # FLD AUTO: 7.31 K/UL — SIGNIFICANT CHANGE UP (ref 3.8–10.5)

## 2024-11-02 PROCEDURE — 99239 HOSP IP/OBS DSCHRG MGMT >30: CPT | Mod: GC

## 2024-11-02 RX ORDER — EPOETIN ALFA 10000 [IU]/ML
4000 SOLUTION INTRAVENOUS; SUBCUTANEOUS
Refills: 0 | Status: DISCONTINUED | OUTPATIENT
Start: 2024-11-02 | End: 2024-11-02

## 2024-11-02 RX ADMIN — Medication 1000 MILLIGRAM(S): at 07:00

## 2024-11-02 RX ADMIN — PREDNISONE 10 MILLIGRAM(S): 20 TABLET ORAL at 05:54

## 2024-11-02 RX ADMIN — INSULIN LISPRO 2: 100 INJECTION, SOLUTION INTRAVENOUS; SUBCUTANEOUS at 08:07

## 2024-11-02 RX ADMIN — Medication 0.2 MILLIGRAM(S): at 08:19

## 2024-11-02 RX ADMIN — SODIUM ZIRCONIUM CYCLOSILICATE 10 GRAM(S): 5 POWDER, FOR SUSPENSION ORAL at 05:56

## 2024-11-02 RX ADMIN — SEVELAMER HYDROCHLORIDE 1600 MILLIGRAM(S): 800 TABLET ORAL at 12:02

## 2024-11-02 RX ADMIN — GABAPENTIN 300 MILLIGRAM(S): 400 CAPSULE ORAL at 05:54

## 2024-11-02 RX ADMIN — Medication 325 MILLIGRAM(S): at 12:02

## 2024-11-02 RX ADMIN — Medication 1000 MILLIGRAM(S): at 05:54

## 2024-11-02 RX ADMIN — METOPROLOL SUCCINATE 25 MILLIGRAM(S): 50 TABLET, EXTENDED RELEASE ORAL at 05:54

## 2024-11-02 RX ADMIN — Medication 0.2 MILLIGRAM(S): at 09:20

## 2024-11-02 RX ADMIN — Medication 4 MILLIGRAM(S): at 05:59

## 2024-11-02 RX ADMIN — Medication 20 MILLIGRAM(S): at 12:02

## 2024-11-02 RX ADMIN — EPOETIN ALFA 4000 UNIT(S): 10000 SOLUTION INTRAVENOUS; SUBCUTANEOUS at 13:06

## 2024-11-02 RX ADMIN — INSULIN LISPRO 2: 100 INJECTION, SOLUTION INTRAVENOUS; SUBCUTANEOUS at 12:03

## 2024-11-02 RX ADMIN — Medication 90 MILLIGRAM(S): at 05:54

## 2024-11-02 RX ADMIN — SEVELAMER HYDROCHLORIDE 1600 MILLIGRAM(S): 800 TABLET ORAL at 08:07

## 2024-11-04 ENCOUNTER — APPOINTMENT (OUTPATIENT)
Dept: OBGYN | Facility: CLINIC | Age: 54
End: 2024-11-04

## 2024-11-22 NOTE — DISCHARGE NOTE PROVIDER - NSDCHOSPICE_GEN_A_CORE
Call/come to [facility], [phone],  for decreased or no fetal movement, vaginal bleeding, leaking fluid contractions more than 4 in one hour or painful contractions, or unusual vaginal discharge.  Continue to keep your Blood Sugar Log and be sure to log when you do calibrations on your home meter. You can  control solutions from [facility], [phone] in 4-7 days. Follow up appointment in 1 week with .   No 22-Nov-2024 10:50

## 2024-11-25 ENCOUNTER — APPOINTMENT (OUTPATIENT)
Dept: INTERNAL MEDICINE | Facility: CLINIC | Age: 54
End: 2024-11-25

## 2024-11-26 PROCEDURE — 87449 NOS EACH ORGANISM AG IA: CPT

## 2024-11-26 PROCEDURE — 86803 HEPATITIS C AB TEST: CPT

## 2024-11-26 PROCEDURE — 71250 CT THORAX DX C-: CPT | Mod: MC

## 2024-11-26 PROCEDURE — 86304 IMMUNOASSAY TUMOR CA 125: CPT

## 2024-11-26 PROCEDURE — 83735 ASSAY OF MAGNESIUM: CPT

## 2024-11-26 PROCEDURE — 87340 HEPATITIS B SURFACE AG IA: CPT

## 2024-11-26 PROCEDURE — 71275 CT ANGIOGRAPHY CHEST: CPT | Mod: MC

## 2024-11-26 PROCEDURE — 87899 AGENT NOS ASSAY W/OPTIC: CPT

## 2024-11-26 PROCEDURE — 99261: CPT

## 2024-11-26 PROCEDURE — 80053 COMPREHEN METABOLIC PANEL: CPT

## 2024-11-26 PROCEDURE — 93005 ELECTROCARDIOGRAM TRACING: CPT

## 2024-11-26 PROCEDURE — 96375 TX/PRO/DX INJ NEW DRUG ADDON: CPT

## 2024-11-26 PROCEDURE — 85027 COMPLETE CBC AUTOMATED: CPT

## 2024-11-26 PROCEDURE — 83880 ASSAY OF NATRIURETIC PEPTIDE: CPT

## 2024-11-26 PROCEDURE — 96374 THER/PROPH/DIAG INJ IV PUSH: CPT

## 2024-11-26 PROCEDURE — 84443 ASSAY THYROID STIM HORMONE: CPT

## 2024-11-26 PROCEDURE — 82962 GLUCOSE BLOOD TEST: CPT

## 2024-11-26 PROCEDURE — 86706 HEP B SURFACE ANTIBODY: CPT

## 2024-11-26 PROCEDURE — 99285 EMERGENCY DEPT VISIT HI MDM: CPT | Mod: 25

## 2024-11-26 PROCEDURE — 71045 X-RAY EXAM CHEST 1 VIEW: CPT

## 2024-11-26 PROCEDURE — 86900 BLOOD TYPING SEROLOGIC ABO: CPT

## 2024-11-26 PROCEDURE — 86901 BLOOD TYPING SEROLOGIC RH(D): CPT

## 2024-11-26 PROCEDURE — 74174 CTA ABD&PLVS W/CONTRAST: CPT | Mod: MC

## 2024-11-26 PROCEDURE — 83690 ASSAY OF LIPASE: CPT

## 2024-11-26 PROCEDURE — 76830 TRANSVAGINAL US NON-OB: CPT

## 2024-11-26 PROCEDURE — 86704 HEP B CORE ANTIBODY TOTAL: CPT

## 2024-11-26 PROCEDURE — 87640 STAPH A DNA AMP PROBE: CPT

## 2024-11-26 PROCEDURE — 74176 CT ABD & PELVIS W/O CONTRAST: CPT | Mod: MC

## 2024-11-26 PROCEDURE — 87046 STOOL CULTR AEROBIC BACT EA: CPT

## 2024-11-26 PROCEDURE — 99285 EMERGENCY DEPT VISIT HI MDM: CPT

## 2024-11-26 PROCEDURE — 86850 RBC ANTIBODY SCREEN: CPT

## 2024-11-26 PROCEDURE — 87324 CLOSTRIDIUM AG IA: CPT

## 2024-11-26 PROCEDURE — 84100 ASSAY OF PHOSPHORUS: CPT

## 2024-11-26 PROCEDURE — 87507 IADNA-DNA/RNA PROBE TQ 12-25: CPT

## 2024-11-26 PROCEDURE — 85025 COMPLETE CBC W/AUTO DIFF WBC: CPT

## 2024-11-26 PROCEDURE — 87641 MR-STAPH DNA AMP PROBE: CPT

## 2024-11-26 PROCEDURE — 82378 CARCINOEMBRYONIC ANTIGEN: CPT

## 2024-11-26 PROCEDURE — 85610 PROTHROMBIN TIME: CPT

## 2024-11-26 PROCEDURE — 87045 FECES CULTURE AEROBIC BACT: CPT

## 2024-11-26 PROCEDURE — 84484 ASSAY OF TROPONIN QUANT: CPT

## 2024-11-26 PROCEDURE — 76856 US EXAM PELVIC COMPLETE: CPT

## 2024-11-26 PROCEDURE — 87077 CULTURE AEROBIC IDENTIFY: CPT

## 2024-11-26 PROCEDURE — 85049 AUTOMATED PLATELET COUNT: CPT

## 2024-11-26 PROCEDURE — 36415 COLL VENOUS BLD VENIPUNCTURE: CPT

## 2024-11-26 PROCEDURE — 83036 HEMOGLOBIN GLYCOSYLATED A1C: CPT

## 2024-11-26 PROCEDURE — 86022 PLATELET ANTIBODIES: CPT

## 2024-11-26 PROCEDURE — 85730 THROMBOPLASTIN TIME PARTIAL: CPT

## 2024-11-26 PROCEDURE — 87636 SARSCOV2 & INF A&B AMP PRB: CPT

## 2024-11-26 PROCEDURE — 80048 BASIC METABOLIC PNL TOTAL CA: CPT

## 2024-11-26 PROCEDURE — 86738 MYCOPLASMA ANTIBODY: CPT
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
## 2024-12-02 ENCOUNTER — APPOINTMENT (OUTPATIENT)
Dept: OBGYN | Facility: CLINIC | Age: 54
End: 2024-12-02

## 2024-12-05 NOTE — ED PROVIDER NOTE - HOW PATIENT ADDRESSED, PROFILE
Treatment Goal Explanation (Does Not Render In The Note): Stable for the purposes of categorizing medical decision making is defined by the specific treatment goals for an individual patient. A patient that is not at their treatment goal is not stable, even if the condition has not changed and there is no short- term threat to life or function. Treatment Goal Met?: no Oscar

## 2025-01-19 NOTE — ED PROVIDER NOTE - NS_EDPROVIDERDISPOUSERTYPE_ED_A_ED
Pt asleep at: ~2115    Pt slept for 8+ hours.    PRNs given:   Benadryl with successful results.    Appeared to sleep soundly throughout the night. Unable to complete C-SSRS at this time. Maintain current plan of care.      Attending Attestation (For Attendings USE Only)...

## 2025-01-20 NOTE — DISCHARGE NOTE NURSING/CASE MANAGEMENT/SOCIAL WORK - NSFLUVACAGEDISCH_IMM_ALL_CORE
Adult Detail Level: Generalized Quality 226: Preventive Care And Screening: Tobacco Use: Screening And Cessation Intervention: Patient screened for tobacco use and is an ex/non-smoker

## 2025-01-27 NOTE — DISCHARGE NOTE PROVIDER - COLLABORATE WITH
[facility] Hospitalist Progress Note    Subjective:    Patient seen and examined.   Patient got paracentesis today and stated that she still feels bloated and would like to do another paracentesis  Denies any new complaint no nausea vomiting or diarrhea.  Reporting itching with no rash  II/O's    Intake/Output Summary (Last 24 hours) at 1/27/2025 1601  Last data filed at 1/27/2025 1400  Gross per 24 hour   Intake 3874.92 ml   Output --   Net 3874.92 ml       Hospital Meds  Current Facility-Administered Medications   Medication Dose Route Frequency Provider Last Rate Last Admin    parenteral nutrition adult custom cyclic central   Intravenous Continuous PN Johanna Blanco MD        magnesium sulfate 2 g in 50 mL premix IVPB  2 g Intravenous Once Johanna Blanco MD        hydroCORTisone-pramoxine (ANALPRAM-HC) 2.5-1 % rectal cream   Rectal TID PRN Jose Neville MD        lidocaine (LIDOCARE) 4 % patch 1 patch  1 patch Transdermal Daily Sandy Meza MD   1 patch at 01/27/25 1337    PHENYLephrine-mineral oil-petrolatum (PREPARATION H) rectal ointment   Rectal BID PRN Sandy Meza MD        allopurinol (ZYLOPRIM) tablet 300 mg  300 mg Oral Daily Raheem Wood MD   300 mg at 01/27/25 1040    lidocaine viscous 2 % oral solution 10 mL  10 mL Oral BID PRN Raheem Wood MD        butalbital-APAP-caffeine (FIORICET) -40 MG tablet 1 tablet  1 tablet Oral Q4H PRN Raheem Wood MD        fat emul fish oil/plant based 20% (SMOFLIPID) 250 mL infusion  250 mL Intravenous Q24H Kathy Desai MD 20.8 mL/hr at 01/27/25 0827 Rate Verify at 01/27/25 0827    sodium chloride 0.9 % injection 10 mL  10 mL Injection PRN Kandi Sotomayor MD        sodium chloride 0.9 % injection 10 mL  10 mL Injection 2 times per day Kandi Sotomayor MD   10 mL at 01/26/25 2200    sodium chloride 0.9 % injection 10 mL  10 mL Injection 2 times per day Kandi Sotomayor MD   10 mL at 01/26/25 2200    sodium chloride 0.9 % injection 20  mL  20 mL Injection PRN Kandi Sotomayor MD        loperamide (IMODIUM) capsule 2 mg  2 mg Oral BID PRN Sandy Meza MD   2 mg at 01/24/25 1551    loperamide (IMODIUM) capsule 2 mg  2 mg Oral PRN Bassam Staley MD   2 mg at 01/21/25 1145    simethicone (MYLICON) 40 MG/0.6ML drops 160 mg  160 mg Oral Once Bassam Staley MD        polyethylene glycol (MIRALAX) packet 119 g  119 g Oral Once Bassam Staley MD        ondansetron (ZOFRAN) injection 4 mg  4 mg Intravenous Q4H PRN Blanca Weinberg MD   4 mg at 01/27/25 0632    prochlorperazine (COMPAZINE) injection 10 mg  10 mg Intravenous Q6H PRN Kayley Colunga MD   10 mg at 01/16/25 1105    ALPRAZolam (XANAX) tablet 0.5 mg  0.5 mg Oral Once Johanna Blanco MD        [Held by provider] ferrous sulfate (65 mg Fe per 325 mg) tablet 325 mg  325 mg Oral Daily with breakfast Johanna Blanco MD   325 mg at 01/03/25 1145    hydrOXYzine (ATARAX) tablet 25 mg  25 mg Oral QHS PRN Johanna Blanco MD   25 mg at 01/26/25 2250    lidocaine (LIDOCARE) 4 % patch 1 patch  1 patch Transdermal Daily Filiberto Beard MD   1 patch at 01/26/25 2256    hydroCORTisone (CORTIZONE) 1 % cream   Topical 4x Daily PRN Meeta Medina MD   Given at 01/06/25 2136    diphenhydrAMINE (BENADRYL) injection 25 mg  25 mg Intravenous Q4H PRN Meeta Medina MD   25 mg at 01/27/25 0632    albumin human (SPA) 25 % injection 12.5 g  12.5 g Intravenous Q8H PRN Johanna Blanco MD        cetirizine (ZyrTEC) tablet 10 mg  10 mg Oral Daily PRN Johanna Blanco MD   10 mg at 01/05/25 1145    sodium chloride 0.9 % injection 10 mL  10 mL Injection PRN Fab Fu MD        sodium chloride 0.9 % injection 20 mL  20 mL Injection PRN Fab Fu MD   20 mL at 12/21/24 0952    cyclobenzaprine (FLEXERIL) tablet 5 mg  5 mg Oral TID PRN Alessio Arellano MD   5 mg at 01/22/25 0609    sodium chloride 0.9% infusion   Intravenous Continuous PRN Michele Merino MD        simethicone (MYLICON) tablet 125 mg  125 mg  Oral 4x Daily PRN Michele Merino MD   125 mg at 01/26/25 2250    HYDROmorphone (DILAUDID) injection 0.2 mg  0.2 mg Intravenous Q4H PRN Michele Merino MD   0.2 mg at 01/27/25 0632    dextrose 50 % injection 25 g  25 g Intravenous PRN Michele Merino MD        dextrose 50 % injection 12.5 g  12.5 g Intravenous PRN Michele Merino MD   12.5 g at 01/11/25 0005    glucagon (GLUCAGEN) injection 1 mg  1 mg Intramuscular PRN Michele Merino MD        dextrose (GLUTOSE) 40 % gel 15 g  15 g Oral PRN Michele Merino MD   15 g at 12/17/24 1229    dextrose (GLUTOSE) 40 % gel 30 g  30 g Oral PRN Michele Merino MD        midodrine (PROAMATINE) tablet 20 mg  20 mg Oral 4 times per day Mustapha Barros MD   20 mg at 01/27/25 1338    hydrOXYzine (ATARAX) tablet 25 mg  25 mg Oral Q6H PRN Fab Fu MD   25 mg at 01/26/25 1111    diclofenac (VOLTAREN) 1 % gel 4 g  4 g Topical 4x Daily Johanna Blanco MD   4 g at 01/26/25 0832    PARENTERAL NUTRITION - DIETITIAN/PHARMACIST MANAGED   Does not apply See Admin Instructions Johanna Blanco MD        CARBOXYMethylcellulose (REFRESH TEARS) 0.5 % ophthalmic solution 1 drop  1 drop Both Eyes PRN Filiberto Beard MD        pantoprazole (PROTONIX INJECT) injection 40 mg  40 mg Intravenous 2 times per day Gregorio Cline MD   40 mg at 01/27/25 1039    acetaminophen (TYLENOL) tablet 650 mg  650 mg Oral Q4H PRN Meeta Medina MD        Or    acetaminophen (TYLENOL) suppository 650 mg  650 mg Rectal Q4H PRN Meeta Medina MD        sodium chloride 0.9 % injection 10 mL  10 mL Intravenous PRN Meeta Medina MD   10 mL at 12/11/24 0855    aluminum-magnesium hydroxide-simethicone (MAALOX) 200-200-20 MG/5ML suspension 10 mL  10 mL Oral Q4H PRN Johanna Blanco MD        cyanocobalamin (Vitamin B-12) tablet 2,000 mcg  2,000 mcg Oral Daily Sandy Meza MD   2,000 mcg at 01/25/25 0936    hydroCORTisone (ANUSOL-HC) 2.5 % rectal cream 1 application.  1 application. Rectal TID  PRN Johanna Blanco MD        lipase-protease-amylase 24,000-76,000-120,000 units (CREON) per capsule 3 capsule  3 capsule Oral TID  Bassam Staley MD   3 capsule at 01/25/25 0935    rizatriptan (MAXALT-MLT) disintegrating tablet 10 mg  10 mg Oral Q8H PRN Johanna Blanco MD   10 mg at 01/16/25 1225    [Held by provider] vitamin A capsule 3 mg  3 mg Oral Daily Sandy Meza MD        zinc oxide 20 % ointment   Topical PRN Johanna Blanco MD        [Held by provider] zinc sulfate (ZINCATE) capsule 220 mg  220 mg Oral Daily with breakfast Johanna Blanco MD        [Held by provider] cholecalciferol (VITAMIN D) tablet 100 mcg  100 mcg Oral Daily Sandy Meza MD        alteplase (CATHFLO ACTIVASE) injection 2 mg  2 mg Intracatheter PRN Johanna Blanco MD   2 mg at 01/24/25 0556    HYDROcodone-acetaminophen (NORCO) 5-325 MG per tablet 1 tablet  1 tablet Oral Q4H PRN Johanna Blanco MD   1 tablet at 01/24/25 1550    clonazePAM (KlonoPIN) tablet 0.5 mg  0.5 mg Oral BID PRN Johanna Blanco MD   0.5 mg at 01/26/25 2250        Vitals with min/max:      Vital Last Value 24 Hour Range   Temperature 98.1 °F (36.7 °C) (01/27/25 1233) Temp  Min: 98.1 °F (36.7 °C)  Max: 98.2 °F (36.8 °C)   Pulse 92 (01/27/25 1336) Pulse  Min: 88  Max: 96   Respiratory 18 (01/27/25 1233) Resp  Min: 16  Max: 18   Non-Invasive  Blood Pressure 101/66 (01/27/25 1336) BP  Min: 93/63  Max: 116/80   Pulse Oximetry 98 % (01/27/25 1336) SpO2  Min: 95 %  Max: 100 %   Arterial   Blood Pressure   No data recorded           SpO2 Readings from Last 3 Encounters:   01/27/25 98%   12/03/24 99%   11/27/24 100%        Physical Exam    General: Alert in no distress, cachetic & ill appearing   Head and Neck: Normocephalic, atraumatic. Conjunctivae clear,  no erythema, tenderness, or drainage  Respiratory: Coarse on L same , decrease BS on middle/lower lobes LL  Cardiovascular: regular rate and rhythm, no MRG  Gastrointestinal: increased abdominal distention, tenderness in  the left flank area.  Neuro: Cranial nerves grossly intact,  no focal deficit. Speech is normal. Moves all extremities.   Skin: No rash, no jaundice.  Brownish discoloration patches on trunk most notibly (due to chemotherapy). LUE post PICC site with trace erythema, no drainage.   Extremities: mild edema noted in the bilateral elbows  Psych: appropriate mood and affect    Labs     Recent Results (from the past 24 hour(s))   GLUCOSE, BEDSIDE - POINT OF CARE    Collection Time: 01/26/25  5:52 PM    Specimen: Blood   Result Value Ref Range    GLUCOSE, BEDSIDE - POINT OF CARE 108 (H) 70 - 99 mg/dL   GLUCOSE, BEDSIDE - POINT OF CARE    Collection Time: 01/26/25 11:23 PM    Specimen: Blood   Result Value Ref Range    GLUCOSE, BEDSIDE - POINT OF CARE 87 70 - 99 mg/dL   Triglyceride    Collection Time: 01/27/25  4:11 AM    Specimen: Blood, Central Line   Result Value Ref Range    Triglycerides 59 <=149 mg/dL   Magnesium    Collection Time: 01/27/25  4:11 AM    Specimen: Blood, Central Line   Result Value Ref Range    Magnesium 1.9 1.7 - 2.4 mg/dL   Phosphorus    Collection Time: 01/27/25  4:11 AM    Specimen: Blood, Central Line   Result Value Ref Range    Phosphorus 3.8 2.4 - 4.7 mg/dL   Basic Metabolic Panel    Collection Time: 01/27/25  4:11 AM    Specimen: Blood, Central Line   Result Value Ref Range    Fasting Status      Sodium 145 135 - 145 mmol/L    Potassium 4.1 3.4 - 5.1 mmol/L    Chloride 110 97 - 110 mmol/L    Carbon Dioxide 27 21 - 32 mmol/L    Anion Gap 12 7 - 19 mmol/L    Glucose 107 (H) 70 - 99 mg/dL    BUN 34 (H) 6 - 20 mg/dL    Creatinine 0.59 0.51 - 0.95 mg/dL    Glomerular Filtration Rate >90 >=60    BUN/Cr 58 (H) 7 - 25    Calcium 7.8 (L) 8.4 - 10.2 mg/dL   CBC with Automated Differential (performable only)    Collection Time: 01/27/25  4:11 AM    Specimen: Blood, Central Line   Result Value Ref Range    WBC 4.2 4.2 - 11.0 K/mcL    RBC 2.53 (L) 4.00 - 5.20 mil/mcL    HGB 7.8 (L) 12.0 - 15.5 g/dL    HCT  25.0 (L) 36.0 - 46.5 %    MCV 98.8 78.0 - 100.0 fl    MCH 30.8 26.0 - 34.0 pg    MCHC 31.2 (L) 32.0 - 36.5 g/dL    RDW-CV 14.5 11.0 - 15.0 %    RDW-SD 52.0 (H) 39.0 - 50.0 fL     140 - 450 K/mcL    NRBC 0 <=0 /100 WBC    Neutrophil, Percent 76 %    Lymphocytes, Percent 10 %    Mono, Percent 10 %    Eosinophils, Percent 2 %    Basophils, Percent 1 %    Immature Granulocytes 1 %    Absolute Neutrophils 3.2 1.8 - 7.7 K/mcL    Absolute Lymphocytes 0.4 (L) 1.0 - 4.0 K/mcL    Absolute Monocytes 0.4 0.3 - 0.9 K/mcL    Absolute Eosinophils  0.1 0.0 - 0.5 K/mcL    Absolute Basophils 0.0 0.0 - 0.3 K/mcL    Absolute Immature Granulocytes 0.1 0.0 - 0.2 K/mcL   GLUCOSE, BEDSIDE - POINT OF CARE    Collection Time: 01/27/25  5:57 AM    Specimen: Blood   Result Value Ref Range    GLUCOSE, BEDSIDE - POINT OF CARE 124 (H) 70 - 99 mg/dL   GLUCOSE, BEDSIDE - POINT OF CARE    Collection Time: 01/27/25 12:31 PM    Specimen: Blood   Result Value Ref Range    GLUCOSE, BEDSIDE - POINT OF CARE 86 70 - 99 mg/dL       Imaging    US PARACENTESIS   Final Result   Technically successful ultrasound-guided paracentesis.   ___________________________________________________________________________   ______________________________________      PROCEDURES PERFORMED:   Ultrasound Guided therapeutic paracentesis      ANESTHESIA/SEDATION: Conscious sedation was not used for the procedure.      PROPHYLACTIC ANTIBIOTIC: None.      PREPARATION: The site was prepared and draped and all elements of maximal   sterile barrier technique including sterile gloves, sterile gown, mask,   large sterile sheet, hand hygiene and cutaneous antisepsis with 2%   chlorhexidine +70% isopropyl alcohol were used. The benefits, potential   risk, and alternatives of the procedure were explained to the patient,   including but not limited to bleeding, infection and even failure of   intended outcome.  In addition, the patient was informed of the right to   refused the  procedure.  Informed consent was signed and documented in the   medical record.      PROCEDURE/FINDINGS:   ULTRASOUND GUIDED PARACENTESIS: A catheter/needle was introduced into the   largest pocket of fluid in the abdominal cavity under real ultrasound   guidance.  The image demonstrates the tip of the needle within the target   area.  An image was sent to the PACS for documentation purposes.  Using   this access, a paracentesis was performed. The catheter was then removed,   and manual pressure was applied to the puncture site, followed by   application of a sterile dressing.        Location: Right lower quadrant        Needle: Jessica        Description of the fluid: Cloudy white        Albumin: 25 grams intravenously        Amount drained: 3000 cc       SPECIMENS: Samples were sent for laboratory analysis      COMPLICATIONS: None      ESTIMATED BLOOD LOSS: Minimal      RADIATION/CONTRAST DOSE: None.         Electronically Signed by: ANNE MARIE BARNES MD    Signed on: 1/27/2025 2:57 PM    Workstation ID: [id number]      XR CHEST AP OR PA   Final Result   No appreciable pneumothorax.   Small, increased right pleural effusion.   Stable left pleural effusion and basilar density.         Electronically Signed by: SHARMILA CHOI MD    Signed on: 1/24/2025 3:18 PM    Workstation ID: [id number]      XR CHEST AP OR PA   Final Result      Stable heart, lungs, tubes and catheters.      Tiny left apical pneumothorax is unchanged.               Electronically Signed by: VICTOR MANUEL JONES M.D.    Signed on: 1/23/2025 2:29 PM    Workstation ID: [id number]      XR CHEST AP OR PA   Final Result      1.   Small left-sided pneumothorax possibly ex vacuo in nature. Follow-up   recommended      2.   Slightly increasing left base effusion with underlying atelectasis               Electronically Signed by: HEATHER BAHENA M.D.    Signed on: 1/22/2025 1:42 PM    Workstation ID: [id number]      XR CHEST AP OR PA   Final Result       No pneumothorax post left thoracentesis.               Electronically Signed by: MARCOS SUH M.D.    Signed on: 1/20/2025 1:58 PM    Workstation ID: [id number]      US THORACENTESIS   Final Result   Technically successful ultrasound-guided left thoracentesis.      PLAN: Chest xray has been ordered.   ___________________________________________________________________________   ______________________________________      PROCEDURES PERFORMED:   Ultrasound-guided access into the pleural space.   Diagnostic and Therapeutic  Thoracentesis      ANESTHESIA/SEDATION: Conscious sedation was not used for the procedure.      PROPHYLACTIC ANTIBIOTIC: None.      PREPARATION: The site was prepared and draped and all elements of maximal   sterile barrier technique including sterile gloves, sterile gown, mask,   large sterile sheet, hand hygiene and cutaneous antisepsis with 2%   chlorhexidine +70% isopropyl alcohol were used. Discussion of Risks,   Benefits and Alternatives completed with patient/authorized decision maker.   Additionally, potential problems related to recuperation, likelihood of   achieving care, treatment and service goals were discussed. Relevant risks,   benefits and side effects related to alternatives, including the possible   results of not receiving care, treatment and services discussed. When   indicated, any limitations on the confidentiality of information learned   from or about the patient were explained. All patient/authorized decision   maker questions answered and consent for procedure was provided. The   patient's identification, correct procedure and position were verified. The   appropriate site was verified and marked as appropriate. Equipment/Implants   were checked for functionality and availability.      PROCEDURE/FINDINGS:   Ultrasound-guided Access:   A catheter/needle was introduced into the largest pocket of fluid in the   pleural space under real ultrasound guidance. Image  demonstrated the tip of   the needle within the target area. An image was sent to the PACS for   documentation purposes.      Needle: 18-gauge Yueh      Thoracentesis: Using this access, a thoracentesis was performed. The   catheter was then removed, and manual pressure was applied to the puncture   site, followed by application of a sterile dressing.      Total amount of fluid removed: 1400 ml   Description of the fluid: Cloudy yellow   Additional description of the procedure: none      SPECIMENS: None      COMPLICATIONS: No immediate complications      IMPLANTS: None      ESTIMATED BLOOD LOSS: Minimal      RADIATION/CONTRAST DOSE: None         Electronically Signed by: MARCOS SUH M.D.    Signed on: 1/20/2025 1:54 PM    Workstation ID: [id number]      XR CHEST AP OR PA   Final Result   Moderate left-sided pleural effusion and left base   atelectasis/consolidation.            Electronically Signed by: JOEL WALDRON M.D.    Signed on: 1/19/2025 7:12 AM    Workstation ID: [id number]      US PARACENTESIS   Final Result   Technically successful ultrasound-guided paracentesis.   ___________________________________________________________________________   ______________________________________      PROCEDURES PERFORMED:   Ultrasound Guided Therapeutic paracentesis      ANESTHESIA/SEDATION: Conscious sedation was not used for the procedure.      PROPHYLACTIC ANTIBIOTIC: None.      PREPARATION: The site was prepared and draped and all elements of maximal   sterile barrier technique including sterile gloves, sterile gown, mask,   large sterile sheet, hand hygiene and cutaneous antisepsis with 2%   chlorhexidine +70% isopropyl alcohol were used. The benefits, potential   risk, and alternatives of the procedure were explained to the patient,   including but not limited to bleeding, infection and even failure of   intended outcome.  In addition, the patient was informed of the right to   refused the procedure.   Informed consent was signed and documented in the   medical record.      PROCEDURE/FINDINGS:   ULTRASOUND GUIDED PARACENTESIS: A catheter/needle was introduced into the   largest pocket of fluid in the abdominal cavity under real ultrasound   guidance.  The image demonstrates the tip of the needle within the target   area.  An image was sent to the PACS for documentation purposes.  Using   this access, a paracentesis was performed. The catheter was then removed,   and manual pressure was applied to the puncture site, followed by   application of a sterile dressing.        Location: Right lower quadrant        Needle: Jessica        Description of the fluid: Cloudy white        Albumin: 25 grams intravenously        Amount drained: 5000 cc       SPECIMENS: Samples were sent for laboratory analysis      COMPLICATIONS: None      ESTIMATED BLOOD LOSS: Minimal      RADIATION/CONTRAST DOSE: None.         Electronically Signed by: ANNE MARIE BARNES MD    Signed on: 1/16/2025 4:27 PM    Workstation ID: [id number]      XR CHEST AP OR PA   Final Result      Small to moderate left pleural effusion with associated atelectasis,   slightly decreased since 12/26/2024.               Electronically Signed by: ANNE MARIE BARNES MD    Signed on: 1/13/2025 12:36 PM    Workstation ID: [id number]      US PARACENTESIS   Final Result   Technically successful ultrasound-guided paracentesis.      PLANS: none   ___________________________________________________________________________   ______________________________________      PROCEDURES PERFORMED:   Ultrasound Guided Diagnostic and Therapeutic Paracentesis      ANESTHESIA/SEDATION: Conscious sedation was not used for the procedure.      PROPHYLACTIC ANTIBIOTIC: None.      PREPARATION: The site was prepared and draped and all elements of maximal   sterile barrier technique including sterile gloves, sterile gown, mask,   large sterile sheet, hand hygiene and cutaneous antisepsis with 2%    chlorhexidine +70% isopropyl alcohol were used. Discussion of Risks,   Benefits and Alternatives completed with patient/authorized decision maker.   Additionally, potential problems related to recuperation, likelihood of   achieving care, treatment and service goals were discussed. Relevant risks,   benefits and side effects related to alternatives, including the possible   results of not receiving care, treatment and services discussed. When   indicated, any limitations on the confidentiality of information learned   from or about the patient were explained. All patient/authorized decision   maker questions answered and consent for procedure was provided. The   patient's identification, correct procedure and position were verified. The   appropriate site was verified and marked as appropriate. Equipment/Implants   were checked for functionality and availability.      PROCEDURE/FINDINGS:   ULTRASOUND GUIDED PARACENTESIS:   A catheter/needle was introduced into the largest pocket of fluid in the   abdominal cavity under real ultrasound guidance.  The image demonstrates   the tip of the needle within the target area.  An image was sent to the   PACS for documentation purposes.  Using this access, a paracentesis was   performed. The catheter was then removed, and manual pressure was applied   to the puncture site, followed by application of a sterile dressing.      Location: Right lower quadrant   Needle: Jessica Needle   Description of the fluid: Cloudy yellow   Albumin: 25 grams intravenously   Amount drained: 5000 ml       SPECIMENS: None      COMPLICATIONS: None      IMPLANTS: None      ESTIMATED BLOOD LOSS: Minimal      RADIATION/CONTRAST DOSE: None.      Electronically Signed by: MARCOS SUH M.D.    Signed on: 1/9/2025 11:05 AM    Workstation ID: [id number]      Esophagogastroduodenoscopy (EGD) w Video Capsule Endoscopy   Final Result      CT ABDOMEN PELVIS WO CONTRAST   Final Result         1. Moderate  abdominal and moderate to large amount of pelvic ascites which   has shown some mild to moderate improvement from prior examination. No   definite abscess noted however this would be limited due to lack of   contrast and presence of ascites      2. Stable mild retroperitoneal lymphadenopathy      3. Large left and small right-sided pleural effusions with underlying   atelectasis at the bases            Electronically Signed by: HEATHER BAHENA M.D.    Signed on: 1/3/2025 11:39 AM    Workstation ID: [id number]      US PARACENTESIS   Final Result   Impression: Successful ultrasound-guided paracentesis with removal of 6.2   liters.      Electronically Signed by: SHARMILA CHOI MD    Signed on: 1/2/2025 4:42 PM    Workstation ID: [id number]      US VASC UPPER EXTREMITY VENOUS DUPLEX RIGHT   Final Result   1.  Normal exam.  Specifically, there is no evidence of DVT.         Electronically Signed by: GRICELDA LOTT M.D.    Signed on: 12/31/2024 7:30 AM    Workstation ID: [id number]      US PARACENTESIS   Final Result   Technically successful ultrasound-guided paracentesis.   ___________________________________________________________________________   ______________________________________      PROCEDURES PERFORMED:   Ultrasound Guided Diagnostic and Therapeutic Paracentesis      ANESTHESIA/SEDATION: Conscious sedation was not used for the procedure.      PROPHYLACTIC ANTIBIOTIC: None.      PREPARATION: The site was prepared and draped and all elements of maximal   sterile barrier technique including sterile gloves, sterile gown, mask,   large sterile sheet, hand hygiene and cutaneous antisepsis with 2%   chlorhexidine +70% isopropyl alcohol were used. The benefits, potential   risk, and alternatives of the procedure were explained to the patient,   including but not limited to bleeding, infection and even failure of   intended outcome.  In addition, the patient was informed of the right to   refused the  procedure.  Informed consent was signed and documented in the   medical record.      PROCEDURE/FINDINGS:   ULTRASOUND GUIDED PARACENTESIS: A catheter/needle was introduced into the   largest pocket of fluid in the abdominal cavity under real ultrasound   guidance.  The image demonstrates the tip of the needle within the target   area.  An image was sent to the PACS for documentation purposes.  Using   this access, a paracentesis was performed. The catheter was then removed,   and manual pressure was applied to the puncture site, followed by   application of a sterile dressing.        Location: Right lower quadrant        Needle: Jessica        Description of the fluid: Milky white        Albumin:  Given on floor        Amount drained: 5000 cc       SPECIMENS: Samples were sent for laboratory analysis      COMPLICATIONS: None      ESTIMATED BLOOD LOSS: Minimal      RADIATION/CONTRAST DOSE: None.         Electronically Signed by: ANNE MARIE BARNES MD    Signed on: 12/27/2024 3:34 PM    Workstation ID: [id number]      XR CHEST AP OR PA   Final Result      1.   Stable appearance to right-sided Port-A-Cath and left-sided PICC line   with both tips overlying the superior vena cava      2.   Redemonstration of bilateral pleural effusions with underlying   atelectasis (left greater than right)               Electronically Signed by: HEATHER BAHENA M.D.    Signed on: 12/26/2024 2:32 PM    Workstation ID: [id number]      XR CHEST AP OR PA   Final Result      1.   There has been placement of a left-sided PICC line which has its tip   overlying the superior vena cava in good position      2.   Bilateral effusions with underlying infiltrates and/or atelectasis   (left greater than right). Findings have increased from prior exam               Electronically Signed by: HEATHER BAHENA M.D.    Signed on: 12/26/2024 8:10 AM    Workstation ID: [id number]      US PARACENTESIS   Final Result   Technically successful  ultrasound-guided paracentesis.      PLANS: none   ___________________________________________________________________________   ______________________________________      PROCEDURES PERFORMED:   Ultrasound Guided Diagnostic and Therapeutic Paracentesis      ANESTHESIA/SEDATION: Conscious sedation was not used for the procedure.      PROPHYLACTIC ANTIBIOTIC: None.      PREPARATION: The site was prepared and draped and all elements of maximal   sterile barrier technique including sterile gloves, sterile gown, mask,   large sterile sheet, hand hygiene and cutaneous antisepsis with 2%   chlorhexidine +70% isopropyl alcohol were used. Discussion of Risks,   Benefits and Alternatives completed with patient/authorized decision maker.   Additionally, potential problems related to recuperation, likelihood of   achieving care, treatment and service goals were discussed. Relevant risks,   benefits and side effects related to alternatives, including the possible   results of not receiving care, treatment and services discussed. When   indicated, any limitations on the confidentiality of information learned   from or about the patient were explained. All patient/authorized decision   maker questions answered and consent for procedure was provided. The   patient's identification, correct procedure and position were verified. The   appropriate site was verified and marked as appropriate. Equipment/Implants   were checked for functionality and availability.      PROCEDURE/FINDINGS:   ULTRASOUND GUIDED PARACENTESIS:   A catheter/needle was introduced into the largest pocket of fluid in the   abdominal cavity under real ultrasound guidance.  The image demonstrates   the tip of the needle within the target area.  An image was sent to the   PACS for documentation purposes.  Using this access, a paracentesis was   performed. The catheter was then removed, and manual pressure was applied   to the puncture site, followed by application  of a sterile dressing.      Location: Right lower quadrant   Needle: Yueh Needle   Description of the fluid: White milky   Albumin: 50 grams intravenously   Amount drained: 4900 ml       SPECIMENS: None      COMPLICATIONS: None      IMPLANTS: None      ESTIMATED BLOOD LOSS: Minimal      RADIATION/CONTRAST DOSE: None.      Electronically Signed by: MARCOS SUH M.D.    Signed on: 1/3/2025 8:26 AM    Workstation ID: [id number]      US VASC UPPER EXTREMITY VENOUS DUPLEX BILATERAL   Final Result   No evidence of acute DVT in the bilateral upper extremities.   Superficial venous thrombosis around the PICC line in the left basilic vein   in proximal upper arm.      Electronically Signed by: SHOBHA SLOAN M.D.    Signed on: 12/20/2024 7:26 AM    Workstation ID: [id number]      US VASC LOWER EXTREMITY VENOUS DUPLEX BILATERAL   Final Result   Normal lower extremity venous duplex study. No evidence of   acute DVT.         Electronically Signed by: SHOBHA SLOAN M.D.    Signed on: 12/20/2024 7:24 AM    Workstation ID: [id number]      CT ABDOMEN PELVIS WO CONTRAST   Final Result   No evidence of bleeding.      Moderate to large left pleural effusion and small right pleural effusion.      Large volume ascites within the abdomen/pelvis.      Retroperitoneal lymphadenopathy.         Electronically Signed by: MARCOS SUH M.D.    Signed on: 12/15/2024 7:26 PM    Workstation ID: [id number]      US PARACENTESIS   Final Result   Impression: Successful ultrasound-guided paracentesis with removal of 5   liters.      Electronically Signed by: SHARMILA CHOI MD    Signed on: 12/13/2024 2:10 PM    Workstation ID: [id number]      XR CHEST AP OR PA   Final Result      Persistent left lower lobe infiltrate and left pleural effusion.               Electronically Signed by: VICTOR MANUEL JONES M.D.    Signed on: 12/12/2024 5:28 PM    Workstation ID: [id number]      XR CHEST AP OR PA   Final Result   No  pneumothorax status post left thoracentesis..               Electronically Signed by: MARCOS SUH M.D.    Signed on: 12/11/2024 2:57 PM    Workstation ID: [id number]      US THORACENTESIS   Final Result   Technically successful ultrasound-guided left thoracentesis.      PLAN: Chest xray has been ordered.   ___________________________________________________________________________   ______________________________________      PROCEDURES PERFORMED:   Ultrasound-guided access into the pleural space.   Diagnostic and Therapeutic  Thoracentesis      ANESTHESIA/SEDATION: Conscious sedation was not used for the procedure.      PROPHYLACTIC ANTIBIOTIC: None.      PREPARATION: The site was prepared and draped and all elements of maximal   sterile barrier technique including sterile gloves, sterile gown, mask,   large sterile sheet, hand hygiene and cutaneous antisepsis with 2%   chlorhexidine +70% isopropyl alcohol were used. Discussion of Risks,   Benefits and Alternatives completed with patient/authorized decision maker.   Additionally, potential problems related to recuperation, likelihood of   achieving care, treatment and service goals were discussed. Relevant risks,   benefits and side effects related to alternatives, including the possible   results of not receiving care, treatment and services discussed. When   indicated, any limitations on the confidentiality of information learned   from or about the patient were explained. All patient/authorized decision   maker questions answered and consent for procedure was provided. The   patient's identification, correct procedure and position were verified. The   appropriate site was verified and marked as appropriate. Equipment/Implants   were checked for functionality and availability.      PROCEDURE/FINDINGS:   Ultrasound-guided Access:   A catheter/needle was introduced into the largest pocket of fluid in the   pleural space under real ultrasound guidance. Image  demonstrated the tip of   the needle within the target area. An image was sent to the PACS for   documentation purposes.      Needle: 18-gauge Jessica      Thoracentesis: Using this access, a thoracentesis was performed. The   catheter was then removed, and manual pressure was applied to the puncture   site, followed by application of a sterile dressing.      Total amount of fluid removed: 1200 ml   Description of the fluid: Milky yellow   Additional description of the procedure: none      SPECIMENS: Samples were sent for laboratory analysis      COMPLICATIONS: No immediate complications      IMPLANTS: None      ESTIMATED BLOOD LOSS: Minimal      RADIATION/CONTRAST DOSE: None         Electronically Signed by: MARCOS SUH M.D.    Signed on: 12/11/2024 10:35 AM    Workstation ID: [id number]      Esophagogastroduodenoscopy (EGD)   Final Result      XR CHEST AP OR PA   Final Result   1.   Enlarging moderate, loculated left pleural effusion, with increased   left mid to lower lung airspace opacities and left basilar consolidation.   The right lung is relatively clear.            Electronically Signed by: WALKER CODY M.D.    Signed on: 12/8/2024 7:16 AM    Workstation ID: [id number]          Assessment & Plan  56 year old female, with past medical history of stage III follicular lymphoma, recurrent ascites, anxiety, depression, and migraines who presented to the ED with chief complaints of abdominal pain     #Left flank/abd pain  #anascarca  #Recurrent chylous ascites-s/p paracentesis on 1/16  S/p on albumin/lasix  Post paracentesis again 1/2/2025, 6 liters removed.   Repeated paracentesis 1/9/25 with 5L removed  S/p paracentesis on 1/16, 5 L removed   New PICC place in the RUE on 1/10.   -Discussed with hematology, recommending Pleurx catheter due to recurrent ascites.  Patient wants to discuss with hematology before placing pleurx catheter.  Currently wants to be getting paracentesis as needed  S/p  paracentesis for 1/27.  Albumin given.  3 L removed    # left pleural effusion with atelectasis  Anascarca  #Atelectasis   #LARIOS likely 2/2 ascites   #left pleuritic pain at left lateral side of the chest post thoracentesis  removed, labs showing transudative fluid.  Last session was on Friday/20.  -Repeat chest x-ray with moderate left-sided pleural effusion  -Chest x-ray post thoracentesis, without pneumothorax.  -follow up with CXR   Planning IR thoracentesis pleural catheter placement  S/p multiple thoracentesis  -pulmonology consulted, recs appreciated     #Protein losing enteropathy  Small bowel biopsy is unremarkable in June    Per oncology, Her end of treatment PET-CT shows some residual lymphadenopathy, but SUV is low and we favor reactive etiology. Any aggressive lymphoma has now resolved. Multiple scopes, biopsies, and cytology analyses of her fluid have all been NEGATIVE for lymphoma, therefore protein losing enteropathy appears to be unrelated to history of lymphoma   MR enteropathy cannot be done,  TPN as tolerated (has itching so trials of stopping mvi did not help, today will stop lipids and see if that helps)  S/p EGD with video capsule endoscopy on 1/6  GI signed off and cleared patient for discharge, recommended subsequent eval at quaternary care center such as [facility], [facility], or [facility] for evaluation    Unable to transfer to [facility] due to her insurance.     #Itching  Antihistamine, supportive care    #dysphagia  -egd showing Candida esophagitis  -S/p fluconazole for 14 days 12/10-12/23.  -Continue TPN as tolerated  Can consider repeat EGD in 8 weeks to assess healing of esophagitis, however unlikely to change clinical course.      Abscess on hard palate  -post treatment, abx ended 12/19  -Monitor follow-up as outpatient.    #Shock: resolved    #Left upper arm swelling, resolved  Neg doppler 12/30    #Grade 3A Follicular Lymphoma   -R-CHOP therapy last received 10/2  - Oncology  consulted-No chemotherapy indicated at this time. Recommend to continue monitoring lymphoma off therapy.   -Oncology feels lymphoma is controlled and not the driving process in the overall clinical picture     #Thrombocytopenia  Resolved    Chronic hypotension   On Midodrine 20mg q 6hrs   Monitor     #Low vit A: replacement ordered, Vit A level low     #Anxiety/Major depression   Not taking remeron or duloxetine, have stopped them 12/29  Xanax prn     #Hx of Migraine Headache  - Tylenol 650mg q4h PRN  - Continue PTA Rizatriptan 10mg QID PRN      #Generalized weakness  - Deconditioning  - PT/OT     # Hypocalcemia-replace  #Hypomagnesemia: replete prn  #Chronic normocytic anemia-  Hgb 8.5, monitor  #Vit D deficiency -vitamin D-13.3  patient wants to use her own gummy supplements as she is not able to tolerate the pills.    UE edema; Dopplers of both UE and LE neg for DVT on 12/19  Feet edema: Uric acid wnl   Urine overflow : Improved, likely 2/2 large ascites pushing, clear UA.       FEN    Electrolytes: stable, CTM  Nutrition: regular  Nutrition status: Malnutrition chronic; severe: Weight loss of > 5%/1 month, Weight loss of >10%/6 months, Weight loss of >20%/1 year, Severe depletion of body fat, Severe depletion of muscle mass. Intervention:Coordination of Nutrition care by a nutritional Professional, Nutritional Supplemental therapy. See RD Note for Current recommendations.   DVT Ppx: heparin on hold   Dispo: Pt is cleared by GI for dc.  Hematology recommending Pleurx cath, patient does not want it placed at this time. Paracentesis Monday today and plan for discharge tomorrow.  Patient no longer requiring oxygen    Plan to transfer to [facility] when medically cleared    Code Status    Code Status: Full Resuscitation        All patient's labs and studies have been reviewed.  All patient's questions and concerns were addressed.    Raheem Wood MD   Internal Medicine   Best Practices Inpatient  Care  [phone]      This note was created using the Dragon voice recognition system. Errors in content may be related to improper recognition of the system. Effort to review and correct the note has been made but irregularities may still be present.    Note to Patient: The 21st Century Cures Act makes medical notes like these available to patients in the interest of transparency. However, be advised this is a medical document. It is intended as dryd-cm-njkk communication. It is written in medical language and may contain abbreviations or verbiage that are unfamiliar. It may appear blunt or direct. Medical documents are intended to carry relevant information, facts as evident, and the clinical opinion of the practitioner.  This note may have been transcribed using a voice dictation system. Voice recognition errors may occur. This should not be taken to alter the content or meaning of this note.      ACP

## 2025-02-04 ENCOUNTER — APPOINTMENT (OUTPATIENT)
Dept: TRANSPLANT | Facility: CLINIC | Age: 55
End: 2025-02-04

## 2025-02-04 ENCOUNTER — APPOINTMENT (OUTPATIENT)
Dept: NEPHROLOGY | Facility: CLINIC | Age: 55
End: 2025-02-04

## 2025-02-13 NOTE — PROGRESS NOTE ADULT - NSPROGADDITIONALINFOA_GEN_ALL_CORE
-Plan discussed with pt/team.  Contact info: [phone] (24/7). pager [phone]  Amion on [employer]  Teams on M-T-W-F. Unavailable Thu/Weekends/Holidays  Assessed pt/labs/meds and discussed plan of care with primary team  Adjusting insulin  Discharge plan  Follow up care
-Plan discussed with pt/team.  Contact info: [phone] (24/7). pager [phone]  Amion on [employer]  Teams on M-T-W-F. Unavailable Thu/Weekends/Holidays  Assessed pt/labs/meds and discussed plan of care with primary team  Adjusting insulin  Discharge plan  Follow up care
Hypothyroidism

## 2025-03-10 NOTE — DISCHARGE NOTE PROVIDER - NSDCCPGOAL_GEN_ALL_CORE_FT
What Type Of Note Output Would You Prefer (Optional)?: Standard Output
What Is The Reason For Today's Visit?: Full Body Skin Examination
What Is The Reason For Today's Visit? (Being Monitored For X): the development of a new lesion
How Severe Are Your Spot(S)?: mild
To get better and follow your care plan as instructed.

## 2025-03-20 ENCOUNTER — APPOINTMENT (OUTPATIENT)
Dept: NEPHROLOGY | Facility: CLINIC | Age: 55
End: 2025-03-20
Payer: COMMERCIAL

## 2025-03-20 ENCOUNTER — APPOINTMENT (OUTPATIENT)
Dept: TRANSPLANT | Facility: CLINIC | Age: 55
End: 2025-03-20
Payer: COMMERCIAL

## 2025-03-20 ENCOUNTER — NON-APPOINTMENT (OUTPATIENT)
Age: 55
End: 2025-03-20

## 2025-03-20 VITALS
SYSTOLIC BLOOD PRESSURE: 161 MMHG | HEART RATE: 89 BPM | HEIGHT: 60 IN | TEMPERATURE: 97.3 F | DIASTOLIC BLOOD PRESSURE: 84 MMHG | OXYGEN SATURATION: 98 % | BODY MASS INDEX: 21.2 KG/M2 | WEIGHT: 108 LBS

## 2025-03-20 DIAGNOSIS — N18.6 END STAGE RENAL DISEASE: ICD-10-CM

## 2025-03-20 DIAGNOSIS — E11.9 TYPE 2 DIABETES MELLITUS W/OUT COMPLICATIONS: ICD-10-CM

## 2025-03-20 DIAGNOSIS — Z01.818 ENCOUNTER FOR OTHER PREPROCEDURAL EXAMINATION: ICD-10-CM

## 2025-03-20 DIAGNOSIS — I10 ESSENTIAL (PRIMARY) HYPERTENSION: ICD-10-CM

## 2025-03-20 PROCEDURE — 99203 OFFICE O/P NEW LOW 30 MIN: CPT

## 2025-03-20 PROCEDURE — 99215 OFFICE O/P EST HI 40 MIN: CPT

## 2025-03-21 LAB
ABORH: NORMAL
ALBUMIN SERPL ELPH-MCNC: 4.7 G/DL
ALP BLD-CCNC: 104 U/L
ALT SERPL-CCNC: 19 U/L
ANION GAP SERPL CALC-SCNC: 18 MMOL/L
APPEARANCE: ABNORMAL
AST SERPL-CCNC: 26 U/L
BACTERIA: ABNORMAL /HPF
BASOPHILS # BLD AUTO: 0.06 K/UL
BASOPHILS NFR BLD AUTO: 1 %
BILIRUB SERPL-MCNC: 0.4 MG/DL
BILIRUBIN URINE: NEGATIVE
BLOOD URINE: ABNORMAL
BUN SERPL-MCNC: 22 MG/DL
C PEPTIDE SERPL-MCNC: 8.4 NG/ML
CALCIUM SERPL-MCNC: 10 MG/DL
CALCIUM SERPL-MCNC: 10 MG/DL
CAST: 9 /LPF
CHLORIDE SERPL-SCNC: 99 MMOL/L
CHOLEST SERPL-MCNC: 202 MG/DL
CMV IGG SERPL QL: >10 U/ML
CMV IGG SERPL-IMP: POSITIVE
CO2 SERPL-SCNC: 25 MMOL/L
COARSE GRANULAR CASTS: PRESENT
COLOR: YELLOW
COVID-19 NUCLEOCAPSID  GAM ANTIBODY INTERPRETATION: POSITIVE
COVID-19 SPIKE DOMAIN ANTIBODY INTERPRETATION: POSITIVE
CREAT SERPL-MCNC: 4.33 MG/DL
CREAT SPEC-SCNC: 25 MG/DL
CREAT/PROT UR: 41.1 RATIO
EBV EA AB SER IA-ACNC: 36 U/ML
EBV EA AB TITR SER IF: POSITIVE
EBV EA IGG SER QL IA: 143 U/ML
EBV EA IGG SER-ACNC: POSITIVE
EBV EA IGM SER IA-ACNC: NEGATIVE
EBV PATRN SPEC IB-IMP: NORMAL
EBV VCA IGG SER IA-ACNC: 82.8 U/ML
EBV VCA IGM SER QL IA: 10.5 U/ML
EGFRCR SERPLBLD CKD-EPI 2021: 12 ML/MIN/1.73M2
EOSINOPHIL # BLD AUTO: 0.58 K/UL
EOSINOPHIL NFR BLD AUTO: 9.8 %
EPITHELIAL CELLS: >36 /HPF
EPSTEIN-BARR VIRUS CAPSID ANTIGEN IGG: POSITIVE
ESTIMATED AVERAGE GLUCOSE: 137 MG/DL
GLUCOSE QUALITATIVE U: 250 MG/DL
GLUCOSE SERPL-MCNC: 201 MG/DL
HBA1C MFR BLD HPLC: 6.4 %
HBV CORE IGG+IGM SER QL: NONREACTIVE
HBV SURFACE AB SER QL: REACTIVE
HBV SURFACE AB SERPL IA-ACNC: 696.8 MIU/ML
HBV SURFACE AG SER QL: NONREACTIVE
HCG SERPL-MCNC: 5 MIU/ML
HCT VFR BLD CALC: 38.1 %
HCV AB SER QL: NONREACTIVE
HCV S/CO RATIO: 0.17 S/CO
HDLC SERPL-MCNC: 56 MG/DL
HEPATITIS A IGG ANTIBODY: REACTIVE
HGB BLD-MCNC: 12.2 G/DL
HIV1+2 AB SPEC QL IA.RAPID: NONREACTIVE
HSV 1+2 IGG SER IA-IMP: NEGATIVE
HSV 1+2 IGG SER IA-IMP: POSITIVE
HSV1 IGG SER QL: >62.2 INDEX
HSV2 IGG SER QL: 0.07 INDEX
IMM GRANULOCYTES NFR BLD AUTO: 0.3 %
KETONES URINE: NEGATIVE MG/DL
LDLC SERPL-MCNC: 104 MG/DL
LEUKOCYTE ESTERASE URINE: ABNORMAL
LYMPHOCYTES # BLD AUTO: 1.7 K/UL
LYMPHOCYTES NFR BLD AUTO: 28.8 %
MAGNESIUM SERPL-MCNC: 2.7 MG/DL
MAN DIFF?: NORMAL
MCHC RBC-ENTMCNC: 28.9 PG
MCHC RBC-ENTMCNC: 32 G/DL
MCV RBC AUTO: 90.3 FL
MEV IGG FLD QL IA: >300 AU/ML
MEV IGG FLD QL IA: >300 AU/ML
MEV IGG+IGM SER-IMP: POSITIVE
MEV IGG+IGM SER-IMP: POSITIVE
MICROSCOPIC-UA: NORMAL
MONOCYTES # BLD AUTO: 0.4 K/UL
MONOCYTES NFR BLD AUTO: 6.8 %
MUV AB SER-ACNC: POSITIVE
MUV AB SER-ACNC: POSITIVE
MUV IGG SER QL IA: 74.5 AU/ML
MUV IGG SER QL IA: 74.5 AU/ML
NEUTROPHILS # BLD AUTO: 3.14 K/UL
NEUTROPHILS NFR BLD AUTO: 53.3 %
NITRITE URINE: NEGATIVE
NONHDLC SERPL-MCNC: 146 MG/DL
PARATHYROID HORMONE INTACT: 104 PG/ML
PH URINE: 8
PHOSPHATE SERPL-MCNC: 4 MG/DL
PLATELET # BLD AUTO: 264 K/UL
POTASSIUM SERPL-SCNC: 4.2 MMOL/L
PROT SERPL-MCNC: 8 G/DL
PROT UR-MCNC: 1047 MG/DL
PROTEIN URINE: 300 MG/DL
RBC # BLD: 4.22 M/UL
RBC # FLD: 14 %
RED BLOOD CELLS URINE: 4 /HPF
REVIEW: NORMAL
RUBV IGG FLD-ACNC: 1.02 INDEX
RUBV IGG SER-IMP: POSITIVE
SARS-COV-2 AB SERPL IA-ACNC: >250 U/ML
SARS-COV-2 AB SERPL QL IA: 245 INDEX
SODIUM SERPL-SCNC: 142 MMOL/L
SPECIFIC GRAVITY URINE: 1.02
T GONDII AB SER-IMP: NEGATIVE
T GONDII IGG SER QL: <3 IU/ML
T PALLIDUM AB SER QL IA: NEGATIVE
TRIGL SERPL-MCNC: 247 MG/DL
UROBILINOGEN URINE: 0.2 MG/DL
VZV AB TITR SER: POSITIVE
VZV IGG SER IF-ACNC: 7.75 S/CO
WBC # FLD AUTO: 5.9 K/UL
WHITE BLOOD CELLS URINE: 74 /HPF
YEAST-LIKE CELLS: PRESENT

## 2025-03-23 ENCOUNTER — INPATIENT (INPATIENT)
Facility: HOSPITAL | Age: 55
LOS: 3 days | Discharge: HOME | DRG: 195 | End: 2025-03-27
Attending: HOSPITALIST | Admitting: HOSPITALIST
Payer: MEDICARE

## 2025-03-23 VITALS
WEIGHT: 110.89 LBS | HEART RATE: 105 BPM | HEIGHT: 60 IN | RESPIRATION RATE: 24 BRPM | OXYGEN SATURATION: 95 % | SYSTOLIC BLOOD PRESSURE: 163 MMHG | DIASTOLIC BLOOD PRESSURE: 85 MMHG | TEMPERATURE: 98 F

## 2025-03-23 DIAGNOSIS — Z29.9 ENCOUNTER FOR PROPHYLACTIC MEASURES, UNSPECIFIED: ICD-10-CM

## 2025-03-23 DIAGNOSIS — E78.5 HYPERLIPIDEMIA, UNSPECIFIED: ICD-10-CM

## 2025-03-23 DIAGNOSIS — E83.52 HYPERCALCEMIA: ICD-10-CM

## 2025-03-23 DIAGNOSIS — E11.9 TYPE 2 DIABETES MELLITUS WITHOUT COMPLICATIONS: ICD-10-CM

## 2025-03-23 DIAGNOSIS — I10 ESSENTIAL (PRIMARY) HYPERTENSION: ICD-10-CM

## 2025-03-23 DIAGNOSIS — G62.9 POLYNEUROPATHY, UNSPECIFIED: ICD-10-CM

## 2025-03-23 DIAGNOSIS — J18.9 PNEUMONIA, UNSPECIFIED ORGANISM: ICD-10-CM

## 2025-03-23 DIAGNOSIS — N18.6 END STAGE RENAL DISEASE: ICD-10-CM

## 2025-03-23 DIAGNOSIS — D86.9 SARCOIDOSIS, UNSPECIFIED: ICD-10-CM

## 2025-03-23 DIAGNOSIS — R94.31 ABNORMAL ELECTROCARDIOGRAM [ECG] [EKG]: ICD-10-CM

## 2025-03-23 LAB
ALBUMIN SERPL ELPH-MCNC: 3.6 G/DL — SIGNIFICANT CHANGE UP (ref 3.5–5)
ALP SERPL-CCNC: 88 U/L — SIGNIFICANT CHANGE UP (ref 40–120)
ALT FLD-CCNC: 24 U/L DA — SIGNIFICANT CHANGE UP (ref 10–60)
ANION GAP SERPL CALC-SCNC: 7 MMOL/L — SIGNIFICANT CHANGE UP (ref 5–17)
AST SERPL-CCNC: 19 U/L — SIGNIFICANT CHANGE UP (ref 10–40)
BASE EXCESS BLDV CALC-SCNC: 5.6 MMOL/L — SIGNIFICANT CHANGE UP
BASOPHILS # BLD AUTO: 0.04 K/UL — SIGNIFICANT CHANGE UP (ref 0–0.2)
BASOPHILS NFR BLD AUTO: 0.4 % — SIGNIFICANT CHANGE UP (ref 0–2)
BILIRUB SERPL-MCNC: 0.7 MG/DL — SIGNIFICANT CHANGE UP (ref 0.2–1.2)
BUN SERPL-MCNC: 38 MG/DL — HIGH (ref 7–18)
CA-I SERPL-SCNC: SIGNIFICANT CHANGE UP MMOL/L (ref 1.15–1.33)
CALCIUM SERPL-MCNC: 9.2 MG/DL — SIGNIFICANT CHANGE UP (ref 8.4–10.5)
CHLORIDE SERPL-SCNC: 103 MMOL/L — SIGNIFICANT CHANGE UP (ref 96–108)
CO2 SERPL-SCNC: 27 MMOL/L — SIGNIFICANT CHANGE UP (ref 22–31)
CREAT SERPL-MCNC: 7.05 MG/DL — HIGH (ref 0.5–1.3)
EGFR: 7 ML/MIN/1.73M2 — LOW
EGFR: 7 ML/MIN/1.73M2 — LOW
EOSINOPHIL # BLD AUTO: 0.48 K/UL — SIGNIFICANT CHANGE UP (ref 0–0.5)
EOSINOPHIL NFR BLD AUTO: 5.2 % — SIGNIFICANT CHANGE UP (ref 0–6)
FLUAV AG NPH QL: SIGNIFICANT CHANGE UP
FLUBV AG NPH QL: SIGNIFICANT CHANGE UP
GAS PNL BLDA: SIGNIFICANT CHANGE UP
GAS PNL BLDV: 137 MMOL/L — SIGNIFICANT CHANGE UP (ref 136–145)
GAS PNL BLDV: SIGNIFICANT CHANGE UP
GLUCOSE BLDC GLUCOMTR-MCNC: 224 MG/DL — HIGH (ref 70–99)
GLUCOSE BLDV-MCNC: 214 MG/DL — HIGH (ref 70–99)
GLUCOSE SERPL-MCNC: 218 MG/DL — HIGH (ref 70–99)
HCG SERPL-ACNC: 4 MIU/ML — SIGNIFICANT CHANGE UP
HCO3 BLDV-SCNC: 31 MMOL/L — HIGH (ref 22–29)
HCT VFR BLD CALC: 35.6 % — SIGNIFICANT CHANGE UP (ref 34.5–45)
HGB BLD-MCNC: 11.7 G/DL — SIGNIFICANT CHANGE UP (ref 11.5–15.5)
HIV 1 & 2 AB SERPL IA.RAPID: SIGNIFICANT CHANGE UP
IMM GRANULOCYTES NFR BLD AUTO: 0.3 % — SIGNIFICANT CHANGE UP (ref 0–0.9)
LACTATE BLDV-MCNC: 0.6 MMOL/L — SIGNIFICANT CHANGE UP (ref 0.5–2)
LYMPHOCYTES # BLD AUTO: 1.49 K/UL — SIGNIFICANT CHANGE UP (ref 1–3.3)
LYMPHOCYTES # BLD AUTO: 16.2 % — SIGNIFICANT CHANGE UP (ref 13–44)
MAGNESIUM SERPL-MCNC: 2.6 MG/DL — SIGNIFICANT CHANGE UP (ref 1.6–2.6)
MCHC RBC-ENTMCNC: 29 PG — SIGNIFICANT CHANGE UP (ref 27–34)
MCHC RBC-ENTMCNC: 32.9 G/DL — SIGNIFICANT CHANGE UP (ref 32–36)
MCV RBC AUTO: 88.3 FL — SIGNIFICANT CHANGE UP (ref 80–100)
MONOCYTES # BLD AUTO: 0.47 K/UL — SIGNIFICANT CHANGE UP (ref 0–0.9)
MONOCYTES NFR BLD AUTO: 5.1 % — SIGNIFICANT CHANGE UP (ref 2–14)
NEUTROPHILS # BLD AUTO: 6.68 K/UL — SIGNIFICANT CHANGE UP (ref 1.8–7.4)
NEUTROPHILS NFR BLD AUTO: 72.8 % — SIGNIFICANT CHANGE UP (ref 43–77)
NRBC BLD AUTO-RTO: 0 /100 WBCS — SIGNIFICANT CHANGE UP (ref 0–0)
NT-PROBNP SERPL-SCNC: HIGH PG/ML (ref 0–125)
PCO2 BLDV: 45 MMHG — HIGH (ref 39–42)
PH BLDV: 7.44 — HIGH (ref 7.32–7.43)
PHOSPHATE SERPL-MCNC: 4.5 MG/DL — SIGNIFICANT CHANGE UP (ref 2.5–4.5)
PLATELET # BLD AUTO: 234 K/UL — SIGNIFICANT CHANGE UP (ref 150–400)
PO2 BLDV: 49 MMHG — SIGNIFICANT CHANGE UP
POTASSIUM BLDV-SCNC: 4.8 MMOL/L — SIGNIFICANT CHANGE UP (ref 3.5–5.1)
POTASSIUM SERPL-MCNC: 4.5 MMOL/L — SIGNIFICANT CHANGE UP (ref 3.5–5.3)
POTASSIUM SERPL-SCNC: 4.5 MMOL/L — SIGNIFICANT CHANGE UP (ref 3.5–5.3)
PROT SERPL-MCNC: 7.8 G/DL — SIGNIFICANT CHANGE UP (ref 6–8.3)
RBC # BLD: 4.03 M/UL — SIGNIFICANT CHANGE UP (ref 3.8–5.2)
RBC # FLD: 13.4 % — SIGNIFICANT CHANGE UP (ref 10.3–14.5)
RSV RNA NPH QL NAA+NON-PROBE: SIGNIFICANT CHANGE UP
SAO2 % BLDV: 79.8 % — SIGNIFICANT CHANGE UP
SARS-COV-2 RNA SPEC QL NAA+PROBE: SIGNIFICANT CHANGE UP
SODIUM SERPL-SCNC: 137 MMOL/L — SIGNIFICANT CHANGE UP (ref 135–145)
SOURCE RESPIRATORY: SIGNIFICANT CHANGE UP
TROPONIN I, HIGH SENSITIVITY RESULT: 49.5 NG/L — SIGNIFICANT CHANGE UP
WBC # BLD: 9.19 K/UL — SIGNIFICANT CHANGE UP (ref 3.8–10.5)
WBC # FLD AUTO: 9.19 K/UL — SIGNIFICANT CHANGE UP (ref 3.8–10.5)

## 2025-03-23 PROCEDURE — 99223 1ST HOSP IP/OBS HIGH 75: CPT | Mod: GC

## 2025-03-23 PROCEDURE — 71046 X-RAY EXAM CHEST 2 VIEWS: CPT | Mod: 26

## 2025-03-23 PROCEDURE — 99285 EMERGENCY DEPT VISIT HI MDM: CPT

## 2025-03-23 RX ORDER — HEPARIN SODIUM 1000 [USP'U]/ML
5000 INJECTION INTRAVENOUS; SUBCUTANEOUS EVERY 12 HOURS
Refills: 0 | Status: DISCONTINUED | OUTPATIENT
Start: 2025-03-23 | End: 2025-03-27

## 2025-03-23 RX ORDER — ACETAMINOPHEN 500 MG/5ML
650 LIQUID (ML) ORAL EVERY 6 HOURS
Refills: 0 | Status: DISCONTINUED | OUTPATIENT
Start: 2025-03-23 | End: 2025-03-27

## 2025-03-23 RX ORDER — AZITHROMYCIN 250 MG
500 CAPSULE ORAL EVERY 24 HOURS
Refills: 0 | Status: DISCONTINUED | OUTPATIENT
Start: 2025-03-24 | End: 2025-03-25

## 2025-03-23 RX ORDER — MELATONIN 5 MG
3 TABLET ORAL AT BEDTIME
Refills: 0 | Status: DISCONTINUED | OUTPATIENT
Start: 2025-03-23 | End: 2025-03-27

## 2025-03-23 RX ORDER — SEVELAMER HYDROCHLORIDE 800 MG/1
1600 TABLET ORAL THREE TIMES A DAY
Refills: 0 | Status: DISCONTINUED | OUTPATIENT
Start: 2025-03-23 | End: 2025-03-27

## 2025-03-23 RX ORDER — INSULIN LISPRO 100 U/ML
INJECTION, SOLUTION INTRAVENOUS; SUBCUTANEOUS AT BEDTIME
Refills: 0 | Status: DISCONTINUED | OUTPATIENT
Start: 2025-03-23 | End: 2025-03-25

## 2025-03-23 RX ORDER — NIFEDIPINE 30 MG
60 TABLET, EXTENDED RELEASE 24 HR ORAL DAILY
Refills: 0 | Status: DISCONTINUED | OUTPATIENT
Start: 2025-03-23 | End: 2025-03-25

## 2025-03-23 RX ORDER — GABAPENTIN 400 MG/1
300 CAPSULE ORAL
Refills: 0 | Status: DISCONTINUED | OUTPATIENT
Start: 2025-03-23 | End: 2025-03-27

## 2025-03-23 RX ORDER — AZITHROMYCIN 250 MG
500 CAPSULE ORAL ONCE
Refills: 0 | Status: COMPLETED | OUTPATIENT
Start: 2025-03-23 | End: 2025-03-23

## 2025-03-23 RX ORDER — PHENYLEPHRINE HCL 1 %
1 SPRAY, NON-AEROSOL (ML) NASAL ONCE
Refills: 0 | Status: DISCONTINUED | OUTPATIENT
Start: 2025-03-23 | End: 2025-03-23

## 2025-03-23 RX ORDER — VANCOMYCIN HCL IN 5 % DEXTROSE 1.5G/250ML
1000 PLASTIC BAG, INJECTION (ML) INTRAVENOUS ONCE
Refills: 0 | Status: DISCONTINUED | OUTPATIENT
Start: 2025-03-23 | End: 2025-03-23

## 2025-03-23 RX ORDER — CEFTRIAXONE 500 MG/1
1000 INJECTION, POWDER, FOR SOLUTION INTRAMUSCULAR; INTRAVENOUS EVERY 24 HOURS
Refills: 0 | Status: DISCONTINUED | OUTPATIENT
Start: 2025-03-23 | End: 2025-03-26

## 2025-03-23 RX ORDER — DEXTROMETHORPHAN HBR, GUAIFENESIN 200 MG/10ML
100 LIQUID ORAL EVERY 6 HOURS
Refills: 0 | Status: DISCONTINUED | OUTPATIENT
Start: 2025-03-23 | End: 2025-03-27

## 2025-03-23 RX ORDER — MAGNESIUM, ALUMINUM HYDROXIDE 200-200 MG
30 TABLET,CHEWABLE ORAL EVERY 4 HOURS
Refills: 0 | Status: DISCONTINUED | OUTPATIENT
Start: 2025-03-23 | End: 2025-03-27

## 2025-03-23 RX ORDER — METOPROLOL SUCCINATE 50 MG/1
25 TABLET, EXTENDED RELEASE ORAL DAILY
Refills: 0 | Status: DISCONTINUED | OUTPATIENT
Start: 2025-03-23 | End: 2025-03-27

## 2025-03-23 RX ORDER — PREDNISONE 20 MG/1
10 TABLET ORAL DAILY
Refills: 0 | Status: DISCONTINUED | OUTPATIENT
Start: 2025-03-23 | End: 2025-03-27

## 2025-03-23 RX ORDER — FUROSEMIDE 10 MG/ML
80 INJECTION INTRAMUSCULAR; INTRAVENOUS ONCE
Refills: 0 | Status: COMPLETED | OUTPATIENT
Start: 2025-03-23 | End: 2025-03-23

## 2025-03-23 RX ORDER — FERROUS SULFATE 137(45) MG
325 TABLET, EXTENDED RELEASE ORAL DAILY
Refills: 0 | Status: DISCONTINUED | OUTPATIENT
Start: 2025-03-23 | End: 2025-03-27

## 2025-03-23 RX ORDER — CEFEPIME 2 G/20ML
2000 INJECTION, POWDER, FOR SOLUTION INTRAVENOUS ONCE
Refills: 0 | Status: DISCONTINUED | OUTPATIENT
Start: 2025-03-23 | End: 2025-03-23

## 2025-03-23 RX ORDER — ATORVASTATIN CALCIUM 80 MG/1
10 TABLET, FILM COATED ORAL AT BEDTIME
Refills: 0 | Status: DISCONTINUED | OUTPATIENT
Start: 2025-03-23 | End: 2025-03-23

## 2025-03-23 RX ORDER — ONDANSETRON HCL/PF 4 MG/2 ML
4 VIAL (ML) INJECTION EVERY 8 HOURS
Refills: 0 | Status: DISCONTINUED | OUTPATIENT
Start: 2025-03-23 | End: 2025-03-27

## 2025-03-23 RX ORDER — INSULIN LISPRO 100 U/ML
INJECTION, SOLUTION INTRAVENOUS; SUBCUTANEOUS
Refills: 0 | Status: DISCONTINUED | OUTPATIENT
Start: 2025-03-23 | End: 2025-03-25

## 2025-03-23 RX ORDER — ROSUVASTATIN CALCIUM 5 MG/1
1 TABLET, FILM COATED ORAL
Refills: 0 | DISCHARGE

## 2025-03-23 RX ORDER — KETOROLAC TROMETHAMINE 30 MG/ML
15 INJECTION, SOLUTION INTRAMUSCULAR; INTRAVENOUS ONCE
Refills: 0 | Status: DISCONTINUED | OUTPATIENT
Start: 2025-03-23 | End: 2025-03-23

## 2025-03-23 RX ORDER — ACETAMINOPHEN 500 MG/5ML
750 LIQUID (ML) ORAL ONCE
Refills: 0 | Status: COMPLETED | OUTPATIENT
Start: 2025-03-23 | End: 2025-03-23

## 2025-03-23 RX ORDER — FUROSEMIDE 10 MG/ML
80 INJECTION INTRAMUSCULAR; INTRAVENOUS
Refills: 0 | Status: DISCONTINUED | OUTPATIENT
Start: 2025-03-23 | End: 2025-03-27

## 2025-03-23 RX ORDER — DOXYCYCLINE HYCLATE 100 MG
100 TABLET ORAL
Refills: 0 | Status: DISCONTINUED | OUTPATIENT
Start: 2025-03-23 | End: 2025-03-23

## 2025-03-23 RX ORDER — ROSUVASTATIN CALCIUM 5 MG/1
20 TABLET, FILM COATED ORAL AT BEDTIME
Refills: 0 | Status: DISCONTINUED | OUTPATIENT
Start: 2025-03-23 | End: 2025-03-27

## 2025-03-23 RX ADMIN — KETOROLAC TROMETHAMINE 15 MILLIGRAM(S): 30 INJECTION, SOLUTION INTRAMUSCULAR; INTRAVENOUS at 18:57

## 2025-03-23 RX ADMIN — FUROSEMIDE 80 MILLIGRAM(S): 10 INJECTION INTRAMUSCULAR; INTRAVENOUS at 20:24

## 2025-03-23 RX ADMIN — SEVELAMER HYDROCHLORIDE 1600 MILLIGRAM(S): 800 TABLET ORAL at 22:58

## 2025-03-23 RX ADMIN — Medication 250 MILLIGRAM(S): at 18:21

## 2025-03-23 RX ADMIN — ROSUVASTATIN CALCIUM 20 MILLIGRAM(S): 5 TABLET, FILM COATED ORAL at 22:58

## 2025-03-23 RX ADMIN — Medication 750 MILLIGRAM(S): at 18:00

## 2025-03-23 RX ADMIN — CEFTRIAXONE 100 MILLIGRAM(S): 500 INJECTION, POWDER, FOR SOLUTION INTRAMUSCULAR; INTRAVENOUS at 20:11

## 2025-03-23 RX ADMIN — Medication 300 MILLIGRAM(S): at 17:30

## 2025-03-23 NOTE — ED ADULT TRIAGE NOTE - CHIEF COMPLAINT QUOTE
presents with c/o shortness of breath x last night. Patient on hemodialysis last  Tx on Friday 03/21/25.

## 2025-03-23 NOTE — H&P ADULT - TIME BILLING
High risk given hypoxia, pneumonia. Review of external records and labs. Discussion with patient, collateral from patient's daughter, medication reconcillation,r eview of labs and imaging interpretation of imaging, formulation of plan, medical and medication management

## 2025-03-23 NOTE — H&P ADULT - PROBLEM SELECTOR PLAN 1
Nonseptic on admission  Received ceftriaxone, azithromycin in the ER  Chest x-ray with RIGHT perihilar and RIGHT middle lobe lung multifocal airspace ill-defined opacities/infiltrates    Continue with ceftriaxone, doxy (long qtc)  Follow-up blood cultures 3/23  Follow-up procalcitonin  Follow-up urine Legionella, strep Nonseptic on admission  Received ceftriaxone, azithromycin in the ER  Chest x-ray with RIGHT perihilar and RIGHT middle lobe lung multifocal airspace ill-defined opacities/infiltrates    Continue with ceftriaxone, doxy (long qtc)  Follow-up blood cultures 3/23  Follow-up procalcitonin  Follow-up urine Legionella, strep  supplemental o2 prn Nonseptic on admission  Received ceftriaxone, azithromycin in the ER  Chest x-ray with RIGHT perihilar and RIGHT middle lobe lung multifocal airspace ill-defined opacities/infiltrates    Continue with ceftriaxone, azithro (monitor qtc on tele)   Follow-up blood cultures 3/23  Follow-up procalcitonin  Follow-up urine Legionella, strep  supplemental o2 prn

## 2025-03-23 NOTE — CONSULT NOTE ADULT - SUBJECTIVE AND OBJECTIVE BOX
Patient is a 54y old  Female who presents with a chief complaint of pneumonia (23 Mar 2025 17:44)    HPI:  54-year-old woman from home, history of sarcoidosis on prednisone, diabetes off insulin, hypertension, hyperlipidemia, ESRD on dialysis Monday Wednesday Friday with right permacath placed in September, last dialysis on Friday, coming for worsening shortness of breath since day prior to admission. No recent hospitalization, no recent antibiotics.  No known sick contact.  She had pneumococcal vaccine. On the list for kidney transplant.  Chest x-ray is positive for pneumonia. She will be admitted for treatment of pneumonia.     ED Course    Vitals: BPmax 163/85  Labs: glu 214; bnp 75k; BUN/Cr 38/7.05  Intervention: ceftriaxone, azithro, tylenol  Consults: nephro Njeru, Cardio Plitt  Images: CXR RIGHT perihilar and RIGHT middle lobe lung multifocal airspace ill-defined opacities/infiltrates.   (23 Mar 2025 17:44)      PAST MEDICAL & SURGICAL HISTORY:  Sarcoidosis  Diabetes  Hypertension  Hyperlipidemia  Chronic kidney disease (CKD), stage III (moderate)  Seizure disorder    Hysterectomy      SOCIAL HX:   Smoking                         ETOH                            Other    FAMILY HISTORY:  Family history of diabetes mellitus in first degree relative    :  No known cardiovacular family hisotry     ROS:  See HPI     Allergies    No Known Allergies  Intolerances      PHYSICAL EXAM    ICU Vital Signs Last 24 Hrs  T(C): 36.9 (23 Mar 2025 19:03), Max: 37 (23 Mar 2025 16:11)  T(F): 98.4 (23 Mar 2025 19:03), Max: 98.6 (23 Mar 2025 16:11)  HR: 89 (23 Mar 2025 19:29) (89 - 105)  BP: 148/85 (23 Mar 2025 19:03) (148/85 - 163/85)  BP(mean): --  ABP: --  ABP(mean): --  RR: 18 (23 Mar 2025 19:03) (18 - 24)  SpO2: 100% (23 Mar 2025 19:29) (95% - 100%)    O2 Parameters below as of 23 Mar 2025 19:29  Patient On (Oxygen Delivery Method): mask, nonrebreather      General: comfortable on NRB mask   HEENT:  MALKA              Lymphatic system: No LN  Lungs: +bilateral basilar crackles; saturating 100% on NRB  Cardiovascular: Regular  Gastrointestinal: Soft, Positive BS  Musculoskeletal: No clubbing.  Moves all extremities.    Skin: Warm.  Intact  Neurological: No motor or sensory deficit       LABS:                          11.7   9.19  )-----------( 234      ( 23 Mar 2025 15:50 )             35.6                                               03-23    137  |  103  |  38[H]  ----------------------------<  218[H]  4.5   |  27  |  7.05[H]    Ca    9.2      23 Mar 2025 15:50  Phos  4.5     03-23  Mg     2.6     03-23    TPro  7.8  /  Alb  3.6  /  TBili  0.7  /  DBili  x   /  AST  19  /  ALT  24  /  AlkPhos  88  03-23                                             Urinalysis Basic - ( 23 Mar 2025 15:50 )    Color: x / Appearance: x / SG: x / pH: x  Gluc: 218 mg/dL / Ketone: x  / Bili: x / Urobili: x   Blood: x / Protein: x / Nitrite: x   Leuk Esterase: x / RBC: x / WBC x   Sq Epi: x / Non Sq Epi: x / Bacteria: x                                                LIVER FUNCTIONS - ( 23 Mar 2025 15:50 )  Alb: 3.6 g/dL / Pro: 7.8 g/dL / ALK PHOS: 88 U/L / ALT: 24 U/L DA / AST: 19 U/L / GGT: x                                                                        CXR: RIGHT perihilar and RIGHT middle lobe lung multifocal airspace ill-defined opacities/infiltrates.    ECHO: unofficial in ER: bilateral B lines     MEDICATIONS  (STANDING):  cefTRIAXone   IVPB 1000 milliGRAM(s) IV Intermittent every 24 hours  famotidine    Tablet 20 milliGRAM(s) Oral daily  ferrous    sulfate 325 milliGRAM(s) Oral daily  furosemide    Tablet 80 milliGRAM(s) Oral <User Schedule>  gabapentin 300 milliGRAM(s) Oral two times a day  heparin   Injectable 5000 Unit(s) SubCutaneous every 12 hours  insulin lispro (ADMELOG) corrective regimen sliding scale   SubCutaneous three times a day before meals  insulin lispro (ADMELOG) corrective regimen sliding scale   SubCutaneous at bedtime  metoprolol succinate ER 25 milliGRAM(s) Oral daily  NIFEdipine XL 60 milliGRAM(s) Oral daily  predniSONE   Tablet 10 milliGRAM(s) Oral daily  rosuvastatin 20 milliGRAM(s) Oral at bedtime  sevelamer carbonate 1600 milliGRAM(s) Oral three times a day    MEDICATIONS  (PRN):  acetaminophen     Tablet .. 650 milliGRAM(s) Oral every 6 hours PRN Mild Pain (1 - 3)  aluminum hydroxide/magnesium hydroxide/simethicone Suspension 30 milliLiter(s) Oral every 4 hours PRN Dyspepsia  guaiFENesin Oral Liquid (Sugar-Free) 100 milliGRAM(s) Oral every 6 hours PRN Cough  melatonin 3 milliGRAM(s) Oral at bedtime PRN Insomnia  ondansetron Injectable 4 milliGRAM(s) IV Push every 8 hours PRN Nausea and/or Vomiting

## 2025-03-23 NOTE — ED PROVIDER NOTE - PHYSICAL EXAMINATION
Gen:  Awake, alert, NAD, WDWN, NCAT, non-toxic appearing.  Eyes:  PERRL, EOMI, no icterus, normal lids/lashes, normal conjunctivae.  ENT:  External inspection normal, pink/dry membranes.   CV:  S1S2, regular rate and rhythm, no murmur/gallops/rubs, no JVD, 2+ pulses b/l, no edema/cords/homans, good capillary refill, warm/well-perfused. R subclavian HD catheter CDI  Resp:  Normal respiratory rate/effort, no respiratory distress, normal voice, speaking full sentences, lungs w mild rales to auscultation b/l bases, no wheezing, no retractions, no stridor.  Abd:  Soft abdomen, no tender/distended/guarding/rebound, no pulsatile mass, no CVA tender.   Musculoskeletal:  N/V intact, FROM all 4 extremities, normal motor tone  Neck:  FROM neck, supple, trachea midline, no meningismus.   Skin:  Color normal for race, warm and dry, no rash.  Neuro:  Oriented x3, CN 2-12 intact (grossly), normal motor (grossly), normal sensory (grossly), GCS 15  Psych:  Attention normal. Affect normal. Behavior normal. Judgment normal.

## 2025-03-23 NOTE — H&P ADULT - PROBLEM SELECTOR PLAN 2
Lateral T wave inversions new since prior  Prolonged QTc  Admit to telemetry for monitoring  Will obtain TTE and cardio consult now, and she needs cardio evaluation pretransplant anyway Lateral T wave inversions new since prior  Prolonged QTc  Admit to telemetry for monitoring  Will obtain TTE and cardio consult now, and she needs cardio evaluation pretransplant anyway  Cardio Dr. Chapa consulted

## 2025-03-23 NOTE — ED ADULT NURSE NOTE - OBJECTIVE STATEMENT
Pt presents to the ED c/o SOB x today, Pt denies any N/V, dizziness, and chest pain. Dialysis days are MWF, last dialysis was on Friday as per Pt.  Right chest dialysis access present on arrival, dressing clean, dry and intact. Pt is speaking in full sentences, no distress noted.

## 2025-03-23 NOTE — ED PROVIDER NOTE - OBJECTIVE STATEMENT
54 female with hx of sarcoidosis DM, HTN, HLD, ESRD on HD MWF with last full HD on Friday.   Patient presenting to the ED reporting gradual onset, progressive dyspnea since yesterday.  No recent travel, hospitalization, or immobilization.  Patient reports dry weight today unchanged from after dialysis on Friday.

## 2025-03-23 NOTE — ED PROVIDER NOTE - NS ED ROS FT
Constitutional: (-) fever (-) chills  HENT: (-) congestion (-) rhinorrhea (-) sore throat  Eyes: (-) pain (-) redness  Respiratory: (-) cough (+) shortness of breath (-) wheezing (-) stridor  Cardiovascular: (-) chest pain (-) palpitations (-) leg swelling  Gastrointestinal: (-) abdominal pain (-) blood in stool (no melena/hematochezia) (-) diarrhea (-) vomiting  Genitourinary: (-) dysuria (-) hematuria  Musculoskeletal: (-) gait problem (-) joint swelling (-) myalgias  Skin: (-) color change (-) rash  Neurological: (-) weakness (-) numbness (-) headaches  Psychiatric/Behavioral: (-) confusion

## 2025-03-23 NOTE — H&P ADULT - ASSESSMENT
54-year-old woman from home, history of sarcoidosis on prednisone, diabetes off insulin, hypertension, hyperlipidemia, ESRD on dialysis Monday Wednesday Friday with right permacath placed in September, last dialysis on Friday, coming for worsening shortness of breath since day prior to admission. Admitted for treatment of CAP.

## 2025-03-23 NOTE — CHART NOTE - NSCHARTNOTEFT_GEN_A_CORE
EVENT:     HPI:  54F from home, history of sarcoidosis on prednisone, diabetes off insulin, hypertension, hyperlipidemia, ESRD on dialysis Monday Wednesday Friday with right permacath placed in September, last dialysis on Friday, coming for worsening shortness of breath since day prior to admission. Admitted for treatment of CAP.       SUBJECTIVE:    OBJECTIVE:  Vital Signs Last 24 Hrs  T(C): 36.9 (23 Mar 2025 19:03), Max: 37 (23 Mar 2025 16:11)  T(F): 98.4 (23 Mar 2025 19:03), Max: 98.6 (23 Mar 2025 16:11)  HR: 92 (23 Mar 2025 21:44) (89 - 105)  BP: 157/89 (23 Mar 2025 20:26) (148/85 - 163/85)  BP(mean): --  RR: 16 (23 Mar 2025 21:44) (16 - 24)  SpO2: 95% (23 Mar 2025 21:44) (95% - 100%)    Parameters below as of 23 Mar 2025 21:44  Patient On (Oxygen Delivery Method): nasal cannula  O2 Flow (L/min): 4      PHYSICAL EXAM:  Neuro: Awake and alert, oriented to person, place, and time  Cardiovascular: + S1, S2, no murmurs, rubs, or bruits  Respiratory: clear to auscultation bilaterally with good air entry   GI: Abdomen soft, non-tender, bowel sounds present   : Non distended;   Skin: warm and dry; no rash      LABS:                        11.7   9.19  )-----------( 234      ( 23 Mar 2025 15:50 )             35.6     03-23    137  |  103  |  38[H]  ----------------------------<  218[H]  4.5   |  27  |  7.05[H]    Ca    9.2      23 Mar 2025 15:50  Phos  4.5     03-23  Mg     2.6     03-23    TPro  7.8  /  Alb  3.6  /  TBili  0.7  /  DBili  x   /  AST  19  /  ALT  24  /  AlkPhos  88  03-23        EKG:   IMAGING:    ASSESSMENT/PROBLEM:     PLAN:     FOLLOW UP / RESULT: EVENT:     HPI:  54F from home, history of sarcoidosis on prednisone, diabetes off insulin, hypertension, hyperlipidemia, ESRD on dialysis Monday Wednesday Friday with right permacath placed in September, last dialysis on Friday, coming for worsening shortness of breath since day prior to admission. Admitted for treatment of CAP. Hospital course was complicated by hypoxia- saturating 88% on 6L NC. ICU was consulted for acute hypoxic respiratory failure, US of the chest with bilateral anterior B lines. Transitioned to NRB with improvement ot 100%. On Lasix 80 mg daily. Recommended for no ICU at this time, for telemetry instead.     SUBJECTIVE:    OBJECTIVE:  Vital Signs Last 24 Hrs  T(C): 36.9 (23 Mar 2025 19:03), Max: 37 (23 Mar 2025 16:11)  T(F): 98.4 (23 Mar 2025 19:03), Max: 98.6 (23 Mar 2025 16:11)  HR: 92 (23 Mar 2025 21:44) (89 - 105)  BP: 157/89 (23 Mar 2025 20:26) (148/85 - 163/85)  BP(mean): --  RR: 16 (23 Mar 2025 21:44) (16 - 24)  SpO2: 95% (23 Mar 2025 21:44) (95% - 100%)    Parameters below as of 23 Mar 2025 21:44  Patient On (Oxygen Delivery Method): nasal cannula  O2 Flow (L/min): 4      PHYSICAL EXAM:  Neuro: Awake and alert, oriented to person, place, and time  Cardiovascular: + S1, S2, no murmurs, rubs, or bruits  Respiratory: clear to auscultation bilaterally with good air entry   GI: Abdomen soft, non-tender, bowel sounds present   : Non distended;   Skin: warm and dry; no rash      LABS:                        11.7   9.19  )-----------( 234      ( 23 Mar 2025 15:50 )             35.6     03-23    137  |  103  |  38[H]  ----------------------------<  218[H]  4.5   |  27  |  7.05[H]    Ca    9.2      23 Mar 2025 15:50  Phos  4.5     03-23  Mg     2.6     03-23    TPro  7.8  /  Alb  3.6  /  TBili  0.7  /  DBili  x   /  AST  19  /  ALT  24  /  AlkPhos  88  03-23        EKG:   IMAGING:    ASSESSMENT/PROBLEM:     PLAN:     FOLLOW UP / RESULT: EVENT: ABG reviewed (see results)    HPI:  54F from home, history of sarcoidosis on prednisone, diabetes off insulin, hypertension, hyperlipidemia, ESRD on dialysis Monday Wednesday Friday with right permacath placed in September, last dialysis on Friday, coming for worsening shortness of breath since day prior to admission. Admitted for treatment of CAP. Hospital course was complicated by hypoxia- saturating 88% on 6L NC. ICU was consulted for acute hypoxic respiratory failure, US of the chest with bilateral anterior B lines. Transitioned to NRB with improvement ot 100%. On Lasix 80 mg daily. Recommended for no ICU at this time, for telemetry instead.     SUBJECTIVE:    OBJECTIVE:  Vital Signs Last 24 Hrs  T(C): 36.9 (23 Mar 2025 19:03), Max: 37 (23 Mar 2025 16:11)  T(F): 98.4 (23 Mar 2025 19:03), Max: 98.6 (23 Mar 2025 16:11)  HR: 92 (23 Mar 2025 21:44) (89 - 105)  BP: 157/89 (23 Mar 2025 20:26) (148/85 - 163/85)  BP(mean): --  RR: 16 (23 Mar 2025 21:44) (16 - 24)  SpO2: 95% (23 Mar 2025 21:44) (95% - 100%)    Parameters below as of 23 Mar 2025 21:44  Patient On (Oxygen Delivery Method): nasal cannula  O2 Flow (L/min): 4      PHYSICAL EXAM:  Neuro: Awake and alert, oriented to person, place, and time  Cardiovascular: + S1, S2, no murmurs, rubs, or bruits  Respiratory: clear to auscultation bilaterally with good air entry   GI: Abdomen soft, non-tender, bowel sounds present   : Non distended;   Skin: warm and dry; no rash      LABS:                        11.7   9.19  )-----------( 234      ( 23 Mar 2025 15:50 )             35.6     03-23    137  |  103  |  38[H]  ----------------------------<  218[H]  4.5   |  27  |  7.05[H]    Ca    9.2      23 Mar 2025 15:50  Phos  4.5     03-23  Mg     2.6     03-23    TPro  7.8  /  Alb  3.6  /  TBili  0.7  /  DBili  x   /  AST  19  /  ALT  24  /  AlkPhos  88  03-23        EKG:   IMAGING:    ASSESSMENT/PROBLEM:     PLAN:     FOLLOW UP / RESULT: EVENT: ABG reviewed (see results)    HPI:  54F from home, history of sarcoidosis on prednisone, diabetes off insulin, hypertension, hyperlipidemia, ESRD on dialysis Monday Wednesday Friday with right permacath placed in September, last dialysis on Friday, coming for worsening shortness of breath since day prior to admission. Admitted for treatment of CAP. Hospital course was complicated by hypoxia- saturating 88% on 6L NC. ICU was consulted for acute hypoxic respiratory failure, US of the chest with bilateral anterior B lines. Transitioned to NRB mask with improvement ot 100%. On Lasix 80 mg daily. Recommended for no ICU at this time, for telemetry instead.     SUBJECTIVE: Pt seen and examined at bedside, A&Ox3-non toxic appearing on NRB mask, no use of accessory muscle, able to speak complete sentences.     OBJECTIVE:  Vital Signs Last 24 Hrs  T(C): 36.9 (23 Mar 2025 19:03), Max: 37 (23 Mar 2025 16:11)  T(F): 98.4 (23 Mar 2025 19:03), Max: 98.6 (23 Mar 2025 16:11)  HR: 92 (23 Mar 2025 21:44) (89 - 105)  BP: 157/89 (23 Mar 2025 20:26) (148/85 - 163/85)  BP(mean): --  RR: 16 (23 Mar 2025 21:44) (16 - 24)  SpO2: 95% (23 Mar 2025 21:44) (95% - 100%)    Parameters below as of 23 Mar 2025 21:44  Patient On (Oxygen Delivery Method): nasal cannula  O2 Flow (L/min): 4      PHYSICAL EXAM:  Neuro: Awake and alert, oriented to person, place, and time  Cardiovascular: + S1, S2, no murmurs, rubs, or bruits  Respiratory: bibasilar crackles b/l on NRB mask  GI: Abdomen soft, non-tender, bowel sounds present   : Non distended;   Skin: warm and dry; no rash      LABS:                        11.7   9.19  )-----------( 234      ( 23 Mar 2025 15:50 )             35.6     03-23    137  |  103  |  38[H]  ----------------------------<  218[H]  4.5   |  27  |  7.05[H]    Ca    9.2      23 Mar 2025 15:50  Phos  4.5     03-23  Mg     2.6     03-23    TPro  7.8  /  Alb  3.6  /  TBili  0.7  /  DBili  x   /  AST  19  /  ALT  24  /  Alk Phos  88  03-23        EKG:   IMAGING:    ASSESSMENT/PROBLEM: 1. Acute hypoxic respiratory failure likely attributed to CAP/ acute pulmonary edema 2. Metabolic Alkalosis on ABG    PLAN:     -Telemetry monitoring  -Repeat ABG in 2-3 hours per ICU recommendations  -Nephrology Dr. Estrada consulted-no urgent need for HD tonight. Monitor respiratory/volume status.   -If needed, will dialyze first thing in AM  -Continue current/supportive measures     FOLLOW UP / RESULT:    -Repeat ABG at 0000  -F/u with Nephro in AM regarding HD EVENT: ABG reviewed (see results)    HPI:  54F from home, history of sarcoidosis on prednisone, diabetes off insulin, hypertension, hyperlipidemia, ESRD on dialysis Monday Wednesday Friday with right permacath placed in September, last dialysis on Friday, coming for worsening shortness of breath since day prior to admission. Admitted for treatment of CAP. Hospital course was complicated by hypoxia- saturating 88% on 6L NC. ICU was consulted for acute hypoxic respiratory failure, US of the chest with bilateral anterior B lines. Transitioned to NRB mask with improvement ot 100%. On Lasix 80 mg daily. Recommended for no ICU at this time, for telemetry instead.     SUBJECTIVE: Pt seen and examined at bedside, A&Ox3-non toxic appearing on NRB mask, no use of accessory muscle, able to speak complete sentences.     OBJECTIVE:  Vital Signs Last 24 Hrs  T(C): 36.9 (23 Mar 2025 19:03), Max: 37 (23 Mar 2025 16:11)  T(F): 98.4 (23 Mar 2025 19:03), Max: 98.6 (23 Mar 2025 16:11)  HR: 92 (23 Mar 2025 21:44) (89 - 105)  BP: 157/89 (23 Mar 2025 20:26) (148/85 - 163/85)  BP(mean): --  RR: 16 (23 Mar 2025 21:44) (16 - 24)  SpO2: 95% (23 Mar 2025 21:44) (95% - 100%)    Parameters below as of 23 Mar 2025 21:44  Patient On (Oxygen Delivery Method): nasal cannula  O2 Flow (L/min): 4      PHYSICAL EXAM:  Neuro: Awake and alert, oriented to person, place, and time  Cardiovascular: + S1, S2, no murmurs, rubs, or bruits  Respiratory: bibasilar crackles b/l on NRB mask  GI: Abdomen soft, non-tender, bowel sounds present   : Non distended;   Skin: warm and dry; no rash      LABS:                        11.7   9.19  )-----------( 234      ( 23 Mar 2025 15:50 )             35.6     03-23    137  |  103  |  38[H]  ----------------------------<  218[H]  4.5   |  27  |  7.05[H]    Ca    9.2      23 Mar 2025 15:50  Phos  4.5     03-23  Mg     2.6     03-23    TPro  7.8  /  Alb  3.6  /  TBili  0.7  /  DBili  x   /  AST  19  /  ALT  24  /  Alk Phos  88  03-23        EKG:   IMAGING:    ASSESSMENT/PROBLEM: 1. Acute hypoxic respiratory failure likely attributed to CAP/ acute pulmonary edema 2. Metabolic Alkalosis on ABG    PLAN:     -Telemetry monitoring  -Repeat ABG in 2-3 hours per ICU recommendations  -Nephrology Dr. Estrada consulted-no urgent need for HD tonight unless worsening hypoxia. Monitor respiratory/volume status.   -If needed, will dialyze first thing in AM  -Continue current/supportive measures     FOLLOW UP / RESULT:    -Repeat ABG at 0000-signed out to resident  -F/u with Nephro in AM regarding HD

## 2025-03-23 NOTE — ED PROVIDER NOTE - CLINICAL SUMMARY MEDICAL DECISION MAKING FREE TEXT BOX
54 female with hx of sarcoidosis DM, HTN, HLD, ESRD on HD MWF with last full HD on Friday.   Patient presenting to the ED reporting gradual onset, progressive dyspnea since yesterday.  No recent travel, hospitalization, or immobilization.  Patient reports dry weight today unchanged from after dialysis on Friday.    Vitals with mild elevated BP.  Nontoxic appearing, n/v intact.  Airway intact, no respiratory distress, no hypoxia.  Mild Rales at bases B/L.  No abdominal or CVA tenderness.  No peripheral edema.    Discharge no 11/2/2024 reviewed; patient admitted for abdominal pain in the setting of missed HD with elevated BP    Plan to obtain:  -Labs, EKG, CXR, respiratory support as needed, observe/reassess    ED Course:  Lab values demonstrate no acute/emergent pathology.  My independent interpretation of the EKG: Sinus @99, normal axis, normal intervals, normal ST, T WI V4–6 (new since prior 10/30/2024), limited by motion artifact  My independent interpretation of XR: [No consolidation/effusion/pntx.]    [***]    [Patient advised regarding need for close outpatient follow up.  Patient stable for further care in outpatient setting. No indication for inpatient admission at this time. Patient advised regarding symptomatic & supportive care and symptoms to prompt ED return. Strict return precautions provided.]    [Patient requires inpatient admission for further care & stabilization. Care signed out to inpatient team.] 54 female with hx of sarcoidosis DM, HTN, HLD, ESRD on HD MWF with last full HD on Friday.   Patient presenting to the ED reporting gradual onset, progressive dyspnea since yesterday.  No recent travel, hospitalization, or immobilization.  Patient reports dry weight today unchanged from after dialysis on Friday.    Vitals with mild elevated BP.  Nontoxic appearing, n/v intact.  Airway intact, no respiratory distress, no hypoxia.  Mild Rales at bases B/L.  No abdominal or CVA tenderness.  No peripheral edema.    Discharge no 11/2/2024 reviewed; patient admitted for abdominal pain in the setting of missed HD with elevated BP    Plan to obtain:  -Labs, EKG, CXR, respiratory support as needed, observe/reassess    ED Course:  Lab values demonstrate no acute/emergent pathology.  My independent interpretation of the EKG: Sinus @99, normal axis, normal intervals, normal ST, T WI V4–6 (new since prior 10/30/2024), limited by motion artifact  My independent interpretation of XR: RLL consolidation, no effusion/pntx.    [***]    [Patient advised regarding need for close outpatient follow up.  Patient stable for further care in outpatient setting. No indication for inpatient admission at this time. Patient advised regarding symptomatic & supportive care and symptoms to prompt ED return. Strict return precautions provided.]    [Patient requires inpatient admission for further care & stabilization. Care signed out to inpatient team.] 54 female with hx of sarcoidosis DM, HTN, HLD, ESRD on HD MWF with last full HD on Friday.   Patient presenting to the ED reporting gradual onset, progressive dyspnea since yesterday.  No recent travel, hospitalization, or immobilization.  Patient reports dry weight today unchanged from after dialysis on Friday.    Vitals with mild elevated BP.  Nontoxic appearing, n/v intact.  Airway intact, no respiratory distress, no hypoxia.  Mild Rales at bases B/L.  No abdominal or CVA tenderness.  No peripheral edema.    Discharge no 11/2/2024 reviewed; patient admitted for abdominal pain in the setting of missed HD with elevated BP    Plan to obtain:  -Labs, EKG, CXR, respiratory support as needed, observe/reassess    ED Course:  Lab values w normal WBC, lactate, Hb, electrolytes, trop, & LFT. ESRD w elevated Cr. BNP elevated.  My independent interpretation of the EKG: Sinus @99, normal axis, normal intervals, normal ST, T WI V4–6 (new since prior 10/30/2024), limited by motion artifact  My independent interpretation of XR: RLL consolidation, no effusion/pntx.  Supplemental O2 given for comfort.  Abx given.  Patient requires inpatient admission for further care & stabilization. Care signed out to inpatient team.

## 2025-03-23 NOTE — H&P ADULT - ATTENDING COMMENTS
54 female from home, hx of esrd on HD via right chest permacath, sarcoidosis on prednisone, diabetes not on treatment, HTN, HLD, also undergoing renal transplant evaluation who presents with 1 week hx of SOB, fatigue and malaise. No fevers, has cough and sob.  In the ED, xr chest PA and lateral showing a right sided infiltrate. She was noted hypoxia stable on 2liter nasal cannula.  COncerns for ECG changes in the setting of chest pressure pt was admitted to telemetry.    Vital Signs Last 24 Hrs  T(C): 36.9 (23 Mar 2025 19:03), Max: 37 (23 Mar 2025 16:11)  T(F): 98.4 (23 Mar 2025 19:03), Max: 98.6 (23 Mar 2025 16:11)  HR: 96 (23 Mar 2025 19:03) (96 - 105)  BP: 148/85 (23 Mar 2025 19:03) (148/85 - 163/85)  BP(mean): --  RR: 18 (23 Mar 2025 19:03) (18 - 24)  SpO2: 100% (23 Mar 2025 19:03) (95% - 100%)    Parameters below as of 23 Mar 2025 19:03  Patient On (Oxygen Delivery Method): mask, nonrebreather  O2 Flow (L/min): 15    Physical: middle aged female, no distress, speaking compelte sentences, lungs with crackle, heart regular rate, no lower ext edema    54 female ESRD on HD who presents with SOB and cough, found with right sided ppneumonia    #Acute hypoxic respiratory failure  #Right lobe pneumonia  #ESRD on HD  #HTN  #sarcoidosis    At time of exam patient was stable on nasal cannula. Informed that she was desaturating and required NRB- needs ICU screen  Admit to telemetry given findings of new ECG changes, cardio cons/ Patient about to go through cardiac evaluation in preparation for her transplant.  Can check echocardiogram now  Start ceftriaxone, qtc prolongation on ecg, given azithromycin in ED. Continue azithromycin tomorrow, monitor on telemetry  check procal, strept pneumo, legionella, mcoplasma, sputum and blood cultures  Nephro consult- Dr. Estrada to continue HD on schedule. No marked fluid overload on exam nor imaging- HD as scheduled  Can consult cardio given new ecg changes, check echocardiogram.  continue steroid for hx of sarcoidosis

## 2025-03-23 NOTE — H&P ADULT - HISTORY OF PRESENT ILLNESS
54-year-old woman from home, history of sarcoidosis on prednisone, diabetes off insulin, hypertension, hyperlipidemia, ESRD on dialysis Monday Wednesday Friday with right permacath placed in September, last dialysis on Friday, coming for worsening shortness of breath since day prior to admission. No recent hospitalization, no recent antibiotics.  No known sick contact.  She had pneumococcal vaccine. On the list for kidney transplant.  Chest x-ray is positive for pneumonia.  She will be admitted for treatment of pneumonia.     ED Course    Vitals: BPmax 163/85  Labs: glu 214; bnp 75k; BUN/Cr 38/7.05  Intervention: ceftriaxone, azithro, tylenol  Consults: nephro Njeru   Images: CXR RIGHT perihilar and RIGHT middle lobe lung multifocal airspace ill-defined opacities/infiltrates.   39 y/o M pt with hx of diverticulitis and appendectomy presents to ED c/o epigastric pain that radiates to his back since this morning. Pt reports he has had a few beers over the past few days. Pt denies fever, diarrhea, nausea, and vomiting. No further complaints at this time. NKDA. 54-year-old woman from home, history of sarcoidosis on prednisone, diabetes off insulin, hypertension, hyperlipidemia, ESRD on dialysis Monday Wednesday Friday with right permacath placed in September, last dialysis on Friday, coming for worsening shortness of breath since day prior to admission. No recent hospitalization, no recent antibiotics.  No known sick contact.  She had pneumococcal vaccine. On the list for kidney transplant.  Chest x-ray is positive for pneumonia. She will be admitted for treatment of pneumonia.     ED Course    Vitals: BPmax 163/85  Labs: glu 214; bnp 75k; BUN/Cr 38/7.05  Intervention: ceftriaxone, azithro, tylenol  Consults: nephro Njeru   Images: CXR RIGHT perihilar and RIGHT middle lobe lung multifocal airspace ill-defined opacities/infiltrates.   54-year-old woman from home, history of sarcoidosis on prednisone, diabetes off insulin, hypertension, hyperlipidemia, ESRD on dialysis Monday Wednesday Friday with right permacath placed in September, last dialysis on Friday, coming for worsening shortness of breath since day prior to admission. No recent hospitalization, no recent antibiotics.  No known sick contact.  She had pneumococcal vaccine. On the list for kidney transplant.  Chest x-ray is positive for pneumonia. She will be admitted for treatment of pneumonia.     ED Course    Vitals: BPmax 163/85  Labs: glu 214; bnp 75k; BUN/Cr 38/7.05  Intervention: ceftriaxone, doxy, tylenol  Consults: nephro Njeru   Images: CXR RIGHT perihilar and RIGHT middle lobe lung multifocal airspace ill-defined opacities/infiltrates.   54-year-old woman from home, history of sarcoidosis on prednisone, diabetes off insulin, hypertension, hyperlipidemia, ESRD on dialysis Monday Wednesday Friday with right permacath placed in September, last dialysis on Friday, coming for worsening shortness of breath since day prior to admission. No recent hospitalization, no recent antibiotics.  No known sick contact.  She had pneumococcal vaccine. On the list for kidney transplant.  Chest x-ray is positive for pneumonia. She will be admitted for treatment of pneumonia.     ED Course    Vitals: BPmax 163/85  Labs: glu 214; bnp 75k; BUN/Cr 38/7.05  Intervention: ceftriaxone, doxy, tylenol  Consults: nephro Njeru, Cardio Plitt  Images: CXR RIGHT perihilar and RIGHT middle lobe lung multifocal airspace ill-defined opacities/infiltrates.   54-year-old woman from home, history of sarcoidosis on prednisone, diabetes off insulin, hypertension, hyperlipidemia, ESRD on dialysis Monday Wednesday Friday with right permacath placed in September, last dialysis on Friday, coming for worsening shortness of breath since day prior to admission. No recent hospitalization, no recent antibiotics.  No known sick contact.  She had pneumococcal vaccine. On the list for kidney transplant.  Chest x-ray is positive for pneumonia. She will be admitted for treatment of pneumonia.     ED Course    Vitals: BPmax 163/85  Labs: glu 214; bnp 75k; BUN/Cr 38/7.05  Intervention: ceftriaxone, azithro, tylenol  Consults: nephro Njeru, Cardio Plitt  Images: CXR RIGHT perihilar and RIGHT middle lobe lung multifocal airspace ill-defined opacities/infiltrates.

## 2025-03-23 NOTE — H&P ADULT - NSHPREVIEWOFSYSTEMS_GEN_ALL_CORE
CONSTITUTIONAL:  No fevers or chills, good appetite, good general state  EYES/ENT:  No visual changes;  No vertigo or throat pain   NECK:  No neck pain or stiffness  RESPIRATORY:  No cough, wheezing, hemoptysis; No shortness of breath  CARDIOVASCULAR:  No chest pain or palpitations  GASTROINTESTINAL:  No abdominal pain. No nausea, vomiting, or hematemesis; No diarrhea or constipation. No melena or hematochezia.  GENITOURINARY:  No dysuria, frequency or hematuria  NEUROLOGICAL:  No HA, no numbness or LE weakness  MSK: no back pain, no joint pain  SKIN:  No itching, no skin rash CONSTITUTIONAL:  +decreased PO +cough +fatigue  EYES/ENT:  No visual changes;  No vertigo or throat pain   NECK:  No neck pain or stiffness  RESPIRATORY:  +wet cough +sob  CARDIOVASCULAR:  No chest pain or palpitations  GASTROINTESTINAL:  No abdominal pain. No nausea, vomiting, or hematemesis; No diarrhea or constipation. No melena or hematochezia.  GENITOURINARY:  No dysuria, frequency or hematuria  NEUROLOGICAL:  +ha  MSK: no back pain, no joint pain  SKIN:  No itching, no skin rash

## 2025-03-23 NOTE — CHART NOTE - NSCHARTNOTEFT_GEN_A_CORE
PT known to renal service with ESRD MWF, last HD friday  presented with SOB cough  CXR suggesting perihilar opacity sec to pneumonia  US revealed B lines.  hypoxia improved with IV ABX and IV lasix  on 4 L  NC.  will plan for HD first shift in the  morning unless with worsening hypoxia overnight-  D/W primary team

## 2025-03-23 NOTE — H&P ADULT - NSHPPHYSICALEXAM_GEN_ALL_CORE
Gen: AOx3, NAD, non-toxic, pleasant  HEAD:  Atraumatic, Normocephalic  EYES: PERRLA, conjunctiva and sclera clear  ENT: Moist mucous membranes  NECK: Supple, No JVD  CV: +tachycardic  Resp: +bilateral crackles posterior lungs   Abd: Soft, nontender, +BS  Ext: No LE edema, no cyanosis, LE pulses present  IV/Skin: No skin rash  Msk: No joint swelling  Neuro: AAOx3. No focal deficits  Psych: no anxiety, no delusional ideas, no suicidal ideation

## 2025-03-23 NOTE — H&P ADULT - PROBLEM SELECTOR PLAN 6
Has stopped using insulin because her primary care doctor said her A1c was in good range  Not currently taking any medication  A1c 6.9 in Nov 2024  Follow-up A1c  ISS

## 2025-03-23 NOTE — CONSULT NOTE ADULT - ATTENDING COMMENTS
acute respiratory failure with hypoxia  acute pulmonary edema  esrd on hd  ?pna  hypertension      pt seen examined and chart reviewed; above plan of care updated with resident    Pt on NRB, with stable hemodynamics, saturating 100%, breathing comfortably and completing sentences.    trial of IV diuresis (as pt is on chronic lasix and claims to still void)  obtain ABG  c/w NRB; wean as tolerates and pending ABG acute respiratory failure with hypoxia  acute pulmonary edema  esrd on hd  ?pna  hypertension      pt seen examined and chart reviewed; above plan of care updated with resident    Pt was on NRB, with stable hemodynamics, saturating 100%, breathing comfortably and completing sentences.    trial of IV diuresis (as pt is on chronic lasix and claims to still void)    Later- ABG obtained on RA per gas results (fio2 21%); thereafter, pt placed on 4L NC and remained comfortable saturating %     Pt stable for med-tele; c/w diuresis as tolerates; HD in AM; remainder of care per primary team acute respiratory failure with hypoxia  acute pulmonary edema  esrd on hd  ?pna  hypertension      pt seen examined and chart reviewed; above plan of care updated with resident    Pt was on NRB, with stable hemodynamics, saturating 100%, breathing comfortably and completing sentences.    trial of IV diuresis (as pt is on chronic lasix and claims to still void)    Later- ABG obtained on RA per gas results (fio2 21%); thereafter, pt placed on 4L NC and remained comfortable saturating %     Pt stable for med-tele; c/w diuresis as tolerates; HD in AM; remainder of care per primary team    Reconsult as needed

## 2025-03-23 NOTE — H&P ADULT - PROBLEM SELECTOR PLAN 4
Monday Wednesday Friday  Has right permacath  Last full dialysis Friday 3/21  On renal transplant list  Follows with Dr. Duke    Renal diet, fluid restriction  Continue sevelamer  Continue Lasix on nondialysis days  Dr. Estrada consulted

## 2025-03-23 NOTE — ED PROVIDER NOTE - INTERPRETATION
Sinus @99, normal axis, normal intervals, normal ST, T WI V4–6 (new since prior 10/30/2024), limited by motion artifact

## 2025-03-24 ENCOUNTER — RESULT REVIEW (OUTPATIENT)
Age: 55
End: 2025-03-24

## 2025-03-24 LAB
A1C WITH ESTIMATED AVERAGE GLUCOSE RESULT: 6.2 % — HIGH (ref 4–5.6)
ANION GAP SERPL CALC-SCNC: 6 MMOL/L — SIGNIFICANT CHANGE UP (ref 5–17)
BASOPHILS # BLD AUTO: 0.03 K/UL — SIGNIFICANT CHANGE UP (ref 0–0.2)
BASOPHILS NFR BLD AUTO: 0.4 % — SIGNIFICANT CHANGE UP (ref 0–2)
BUN SERPL-MCNC: 46 MG/DL — HIGH (ref 7–18)
CALCIUM SERPL-MCNC: 9.1 MG/DL — SIGNIFICANT CHANGE UP (ref 8.4–10.5)
CHLORIDE SERPL-SCNC: 101 MMOL/L — SIGNIFICANT CHANGE UP (ref 96–108)
CO2 SERPL-SCNC: 29 MMOL/L — SIGNIFICANT CHANGE UP (ref 22–31)
CREAT SERPL-MCNC: 7.86 MG/DL — HIGH (ref 0.5–1.3)
EBV DNA SERPL NAA+PROBE-ACNC: NOT DETECTED IU/ML
EBVPCR LOG: NOT DETECTED LOG10IU/ML
EGFR: 6 ML/MIN/1.73M2 — LOW
EGFR: 6 ML/MIN/1.73M2 — LOW
EOSINOPHIL # BLD AUTO: 0.54 K/UL — HIGH (ref 0–0.5)
EOSINOPHIL NFR BLD AUTO: 7.3 % — HIGH (ref 0–6)
ESTIMATED AVERAGE GLUCOSE: 131 MG/DL — HIGH (ref 68–114)
GAS PNL BLDA: SIGNIFICANT CHANGE UP
GLUCOSE BLDC GLUCOMTR-MCNC: 143 MG/DL — HIGH (ref 70–99)
GLUCOSE BLDC GLUCOMTR-MCNC: 156 MG/DL — HIGH (ref 70–99)
GLUCOSE BLDC GLUCOMTR-MCNC: 264 MG/DL — HIGH (ref 70–99)
GLUCOSE BLDC GLUCOMTR-MCNC: 294 MG/DL — HIGH (ref 70–99)
GLUCOSE SERPL-MCNC: 183 MG/DL — HIGH (ref 70–99)
HBV SURFACE AB SER-ACNC: REACTIVE — SIGNIFICANT CHANGE UP
HBV SURFACE AG SER-ACNC: SIGNIFICANT CHANGE UP
HCT VFR BLD CALC: 32.6 % — LOW (ref 34.5–45)
HCV AB S/CO SERPL IA: 0.15 S/CO — SIGNIFICANT CHANGE UP (ref 0–0.79)
HCV AB SERPL-IMP: SIGNIFICANT CHANGE UP
HGB BLD-MCNC: 10.6 G/DL — LOW (ref 11.5–15.5)
IMM GRANULOCYTES NFR BLD AUTO: 0.4 % — SIGNIFICANT CHANGE UP (ref 0–0.9)
LEGIONELLA AG UR QL: NEGATIVE — SIGNIFICANT CHANGE UP
LYMPHOCYTES # BLD AUTO: 1.59 K/UL — SIGNIFICANT CHANGE UP (ref 1–3.3)
LYMPHOCYTES # BLD AUTO: 21.5 % — SIGNIFICANT CHANGE UP (ref 13–44)
M TB IFN-G BLD-IMP: NEGATIVE
MCHC RBC-ENTMCNC: 28.6 PG — SIGNIFICANT CHANGE UP (ref 27–34)
MCHC RBC-ENTMCNC: 32.5 G/DL — SIGNIFICANT CHANGE UP (ref 32–36)
MCV RBC AUTO: 87.9 FL — SIGNIFICANT CHANGE UP (ref 80–100)
MONOCYTES # BLD AUTO: 0.48 K/UL — SIGNIFICANT CHANGE UP (ref 0–0.9)
MONOCYTES NFR BLD AUTO: 6.5 % — SIGNIFICANT CHANGE UP (ref 2–14)
MRSA PCR RESULT.: DETECTED
NEUTROPHILS # BLD AUTO: 4.71 K/UL — SIGNIFICANT CHANGE UP (ref 1.8–7.4)
NEUTROPHILS NFR BLD AUTO: 63.9 % — SIGNIFICANT CHANGE UP (ref 43–77)
NRBC BLD AUTO-RTO: 0 /100 WBCS — SIGNIFICANT CHANGE UP (ref 0–0)
PLATELET # BLD AUTO: 203 K/UL — SIGNIFICANT CHANGE UP (ref 150–400)
POTASSIUM SERPL-MCNC: 4.5 MMOL/L — SIGNIFICANT CHANGE UP (ref 3.5–5.3)
POTASSIUM SERPL-SCNC: 4.5 MMOL/L — SIGNIFICANT CHANGE UP (ref 3.5–5.3)
PROCALCITONIN SERPL-MCNC: 0.67 NG/ML — HIGH (ref 0.02–0.1)
PROCALCITONIN SERPL-MCNC: 0.81 NG/ML — HIGH (ref 0.02–0.1)
QUANTIFERON TB PLUS MITOGEN MINUS NIL: 3.36 IU/ML
QUANTIFERON TB PLUS NIL: 0.04 IU/ML
QUANTIFERON TB PLUS TB1 MINUS NIL: 0 IU/ML
QUANTIFERON TB PLUS TB2 MINUS NIL: 0 IU/ML
RBC # BLD: 3.71 M/UL — LOW (ref 3.8–5.2)
RBC # FLD: 13.2 % — SIGNIFICANT CHANGE UP (ref 10.3–14.5)
S AUREUS DNA NOSE QL NAA+PROBE: DETECTED
SODIUM SERPL-SCNC: 136 MMOL/L — SIGNIFICANT CHANGE UP (ref 135–145)
STRONGYLOIDES AB SER IA-ACNC: NEGATIVE
WBC # BLD: 7.38 K/UL — SIGNIFICANT CHANGE UP (ref 3.8–10.5)
WBC # FLD AUTO: 7.38 K/UL — SIGNIFICANT CHANGE UP (ref 3.8–10.5)

## 2025-03-24 PROCEDURE — 99222 1ST HOSP IP/OBS MODERATE 55: CPT

## 2025-03-24 PROCEDURE — 99233 SBSQ HOSP IP/OBS HIGH 50: CPT | Mod: GC

## 2025-03-24 PROCEDURE — 93306 TTE W/DOPPLER COMPLETE: CPT | Mod: 26

## 2025-03-24 RX ORDER — ACETAMINOPHEN 500 MG/5ML
750 LIQUID (ML) ORAL ONCE
Refills: 0 | Status: COMPLETED | OUTPATIENT
Start: 2025-03-24 | End: 2025-03-24

## 2025-03-24 RX ADMIN — GABAPENTIN 300 MILLIGRAM(S): 400 CAPSULE ORAL at 17:41

## 2025-03-24 RX ADMIN — SEVELAMER HYDROCHLORIDE 1600 MILLIGRAM(S): 800 TABLET ORAL at 05:18

## 2025-03-24 RX ADMIN — SEVELAMER HYDROCHLORIDE 1600 MILLIGRAM(S): 800 TABLET ORAL at 14:40

## 2025-03-24 RX ADMIN — METOPROLOL SUCCINATE 25 MILLIGRAM(S): 50 TABLET, EXTENDED RELEASE ORAL at 05:18

## 2025-03-24 RX ADMIN — PREDNISONE 10 MILLIGRAM(S): 20 TABLET ORAL at 05:18

## 2025-03-24 RX ADMIN — INSULIN LISPRO 3: 100 INJECTION, SOLUTION INTRAVENOUS; SUBCUTANEOUS at 17:41

## 2025-03-24 RX ADMIN — Medication 250 MILLIGRAM(S): at 05:19

## 2025-03-24 RX ADMIN — Medication 650 MILLIGRAM(S): at 00:52

## 2025-03-24 RX ADMIN — Medication 650 MILLIGRAM(S): at 06:52

## 2025-03-24 RX ADMIN — Medication 20 MILLIGRAM(S): at 13:56

## 2025-03-24 RX ADMIN — ROSUVASTATIN CALCIUM 20 MILLIGRAM(S): 5 TABLET, FILM COATED ORAL at 21:12

## 2025-03-24 RX ADMIN — Medication 650 MILLIGRAM(S): at 06:22

## 2025-03-24 RX ADMIN — DEXTROMETHORPHAN HBR, GUAIFENESIN 100 MILLIGRAM(S): 200 LIQUID ORAL at 01:09

## 2025-03-24 RX ADMIN — INSULIN LISPRO 3: 100 INJECTION, SOLUTION INTRAVENOUS; SUBCUTANEOUS at 08:00

## 2025-03-24 RX ADMIN — GABAPENTIN 300 MILLIGRAM(S): 400 CAPSULE ORAL at 05:18

## 2025-03-24 RX ADMIN — Medication 325 MILLIGRAM(S): at 13:56

## 2025-03-24 RX ADMIN — DEXTROMETHORPHAN HBR, GUAIFENESIN 100 MILLIGRAM(S): 200 LIQUID ORAL at 07:10

## 2025-03-24 RX ADMIN — CEFTRIAXONE 100 MILLIGRAM(S): 500 INJECTION, POWDER, FOR SOLUTION INTRAMUSCULAR; INTRAVENOUS at 18:16

## 2025-03-24 RX ADMIN — Medication 300 MILLIGRAM(S): at 17:54

## 2025-03-24 RX ADMIN — Medication 60 MILLIGRAM(S): at 05:18

## 2025-03-24 RX ADMIN — Medication 650 MILLIGRAM(S): at 00:22

## 2025-03-24 RX ADMIN — SEVELAMER HYDROCHLORIDE 1600 MILLIGRAM(S): 800 TABLET ORAL at 21:12

## 2025-03-24 RX ADMIN — HEPARIN SODIUM 5000 UNIT(S): 1000 INJECTION INTRAVENOUS; SUBCUTANEOUS at 05:29

## 2025-03-24 RX ADMIN — HEPARIN SODIUM 5000 UNIT(S): 1000 INJECTION INTRAVENOUS; SUBCUTANEOUS at 17:41

## 2025-03-24 NOTE — PROGRESS NOTE ADULT - SUBJECTIVE AND OBJECTIVE BOX
PGY-1 Progress Note discussed with attending    PAGER #: [phone]] TILL 5:00 PM  PLEASE CONTACT ON CALL TEAM:  - On Call Team (Please refer to Aline) FROM 5:00 PM - 8:30PM  - Nightfloat Team FROM 8:30 -7:30 AM    INTERVAL HPI/OVERNIGHT EVENTS:   - No acute events overnight. Patient examined at bedside this AM.  Patient complains of SOB, in mild respiratory distress, on 4L NC.     REVIEW OF SYSTEMS:  CONSTITUTIONAL: No fever, weight loss, or fatigue  RESPIRATORY: SOB; No cough, wheezing, chills  CARDIOVASCULAR: No chest pain, palpitations, dizziness, or leg swelling  GASTROINTESTINAL: No abdominal pain. No nausea, vomiting; No diarrhea or constipation.  GENITOURINARY: No dysuria or hematuria, urinary frequency  NEUROLOGICAL: No headaches, memory loss, loss of strength, numbness, or tremors  SKIN: No itching, burning, rashes, or lesions     MEDICATIONS  (STANDING):  azithromycin  IVPB 500 milliGRAM(s) IV Intermittent every 24 hours  cefTRIAXone   IVPB 1000 milliGRAM(s) IV Intermittent every 24 hours  famotidine    Tablet 20 milliGRAM(s) Oral daily  ferrous    sulfate 325 milliGRAM(s) Oral daily  furosemide    Tablet 80 milliGRAM(s) Oral <User Schedule>  gabapentin 300 milliGRAM(s) Oral two times a day  heparin   Injectable 5000 Unit(s) SubCutaneous every 12 hours  insulin lispro (ADMELOG) corrective regimen sliding scale   SubCutaneous three times a day before meals  insulin lispro (ADMELOG) corrective regimen sliding scale   SubCutaneous at bedtime  metoprolol succinate ER 25 milliGRAM(s) Oral daily  NIFEdipine XL 60 milliGRAM(s) Oral daily  predniSONE   Tablet 10 milliGRAM(s) Oral daily  rosuvastatin 20 milliGRAM(s) Oral at bedtime  sevelamer carbonate 1600 milliGRAM(s) Oral three times a day    MEDICATIONS  (PRN):  acetaminophen     Tablet .. 650 milliGRAM(s) Oral every 6 hours PRN Mild Pain (1 - 3)  aluminum hydroxide/magnesium hydroxide/simethicone Suspension 30 milliLiter(s) Oral every 4 hours PRN Dyspepsia  guaiFENesin Oral Liquid (Sugar-Free) 100 milliGRAM(s) Oral every 6 hours PRN Cough  melatonin 3 milliGRAM(s) Oral at bedtime PRN Insomnia  ondansetron Injectable 4 milliGRAM(s) IV Push every 8 hours PRN Nausea and/or Vomiting      Vital Signs Last 24 Hrs  T(C): 36.5 (24 Mar 2025 10:00), Max: 37.1 (23 Mar 2025 23:32)  T(F): 97.7 (24 Mar 2025 10:00), Max: 98.7 (23 Mar 2025 23:32)  HR: 87 (24 Mar 2025 10:00) (86 - 105)  BP: 164/93 (24 Mar 2025 10:00) (148/85 - 168/91)  BP(mean): --  RR: 20 (24 Mar 2025 10:00) (16 - 24)  SpO2: 98% (24 Mar 2025 10:00) (95% - 100%)    Parameters below as of 24 Mar 2025 10:00  Patient On (Oxygen Delivery Method): nasal cannula  O2 Flow (L/min): 4      PHYSICAL EXAMINATION:  GENERAL: NAD, well-groomed, well-developed  HEAD:  Atraumatic, normocephalic  EYES: Conjunctiva and sclera clear  ENT: Moist mucous membranes  NECK: Supple, normal appearance  CHEST/LUNG: B/L crackles, mild respiratory distress, on 4L NC   HEART: Regular rate and rhythm; no murmurs, rubs, or gallops  ABDOMEN: Soft, nontender, nondistended; bowel sounds present  GENITOURINARY: No suprapubic tenderness  EXTREMITIES/MSK: 2+ peripheral pulses, no LE edema   NERVOUS SYSTEM:  Alert & Oriented X3, speech clear, no deficits  SKIN: Warm, dry, no rashes or lesions                        10.6   7.38  )-----------( 203      ( 24 Mar 2025 05:20 )             32.6     03-24    136  |  101  |  46[H]  ----------------------------<  183[H]  4.5   |  29  |  7.86[H]    Ca    9.1      24 Mar 2025 05:20  Phos  4.5     03-23  Mg     2.6     03-23    TPro  7.8  /  Alb  3.6  /  TBili  0.7  /  DBili  x   /  AST  19  /  ALT  24  /  AlkPhos  88  03-23    LIVER FUNCTIONS - ( 23 Mar 2025 15:50 )  Alb: 3.6 g/dL / Pro: 7.8 g/dL / ALK PHOS: 88 U/L / ALT: 24 U/L DA / AST: 19 U/L / GGT: x           CAPILLARY BLOOD GLUCOSE  POCT Blood Glucose.: 143 mg/dL (24 Mar 2025 11:06)  POCT Blood Glucose.: 264 mg/dL (24 Mar 2025 07:38)  POCT Blood Glucose.: 224 mg/dL (23 Mar 2025 22:46)

## 2025-03-24 NOTE — PROGRESS NOTE ADULT - ASSESSMENT
54 F, from home, PMHx  sarcoidosis [on prednisone], DM, HTN, HLD, ESRD on HD [ MWF s/p R permacath placed in 09/2024], p/w worsening SOB x 1 day. CXR R perihilar and middle lobe lung multifocal PNA. Admitted for treatment of CAP.

## 2025-03-24 NOTE — PROGRESS NOTE ADULT - PROBLEM SELECTOR PLAN 4
hx of ESRD on HD MWF   s/p right permacath in 09/2024  last HD Friday 3/21  on renal transplant list  follows with Dr. Duke  - scheduled for HD today, wean off O2 as tolerated  - renal diet, fluid restriction  - c/w sevelamer  - c/w Lasix on nondialysis days  - nephro Dr. Estrada following

## 2025-03-24 NOTE — PROGRESS NOTE ADULT - PROBLEM SELECTOR PLAN 6
hx of DM, stopped insulin because her primary care doctor said her A1c was in good range  not currently taking any medication  hba1c 6.9 in Nov 2024  - f/u hba1c   - ISS, adjust as indicated  - monitor fingersticks   - diabetic diet

## 2025-03-24 NOTE — CONSULT NOTE ADULT - SUBJECTIVE AND OBJECTIVE BOX
[facility] : Progress Note :: [phone], (office [phone]),   Dr Estrada / Dr Das / Dr Gale / Dr Vega / Dr Debbie FORD / Dr Garcia / Dr Chandra / Dr Jona pal  _____________________________________________________________________________________________  Patient is a 54y Female whom presented to the hospital last evening with SOB and cough. Has H/O sarcoidosis, ESRD on HD at [facility].  in ED last night noted to be hypoxic. CXR revealed RT middle lobe multi-focal infiltrates suggesting PNA . US reveal B lines, improved hypoxia with IV ABX and IV lasix.  renal consulted for ESRD and resumption of HD.  She is S/P HD this morning with ultrafiltration. SOB improved, but remains on oxygen via NC. AS per did not miss dialysis sessions      PAST MEDICAL & SURGICAL HISTORY:  Sarcoidosis      Diabetes      Hypertension      Hyperlipidemia      Chronic kidney disease (CKD), stage III (moderate)      Seizure disorder      No significant past surgical history        No Known Allergies    Home Medications Reviewed  Hospital Medications:   MEDICATIONS  (STANDING):  azithromycin  IVPB 500 milliGRAM(s) IV Intermittent every 24 hours  cefTRIAXone   IVPB 1000 milliGRAM(s) IV Intermittent every 24 hours  famotidine    Tablet 20 milliGRAM(s) Oral daily  ferrous    sulfate 325 milliGRAM(s) Oral daily  furosemide    Tablet 80 milliGRAM(s) Oral <User Schedule>  gabapentin 300 milliGRAM(s) Oral two times a day  heparin   Injectable 5000 Unit(s) SubCutaneous every 12 hours  insulin lispro (ADMELOG) corrective regimen sliding scale   SubCutaneous three times a day before meals  insulin lispro (ADMELOG) corrective regimen sliding scale   SubCutaneous at bedtime  metoprolol succinate ER 25 milliGRAM(s) Oral daily  NIFEdipine XL 60 milliGRAM(s) Oral daily  predniSONE   Tablet 10 milliGRAM(s) Oral daily  rosuvastatin 20 milliGRAM(s) Oral at bedtime  sevelamer carbonate 1600 milliGRAM(s) Oral three times a day    SOCIAL HISTORY:  Denies ETOh,Smoking,   FAMILY HISTORY:  Family history of diabetes mellitus in first degree relative          VITALS:  T(F): 97.5 (03-24-25 @ 13:00), Max: 98.7 (03-23-25 @ 23:32)  HR: 90 (03-24-25 @ 13:00)  BP: 188/96 (03-24-25 @ 13:00)  RR: 19 (03-24-25 @ 13:00)  SpO2: 99% (03-24-25 @ 13:00)  Wt(kg): --    Height (cm): 152.4 (03-23 @ 14:33)  Weight (kg): 50.3 (03-23 @ 14:33)  BMI (kg/m2): 21.7 (03-23 @ 14:33)  BSA (m2): 1.45 (03-23 @ 14:33)  PHYSICAL EXAM:  Constitutional: NAD  HEENT: anicteric sclera, oropharynx clear.  Neck: No JVD  Respiratory: CTAB, no wheezes, rales or rhonchi  Cardiovascular: S1, S2, RRR  Gastrointestinal: BS+, soft, NT/ND  Extremities:  No peripheral edema  Neurological: A/O x 3, no focal deficits  : No CVA tenderness. No rai.   RT IJ permacath       LABS:  03-24    136  |  101  |  46[H]  ----------------------------<  183[H]  4.5   |  29  |  7.86[H]    Ca    9.1      24 Mar 2025 05:20  Phos  4.5     03-23  Mg     2.6     03-23    TPro  7.8  /  Alb  3.6  /  TBili  0.7  /  DBili      /  AST  19  /  ALT  24  /  AlkPhos  88  03-23    Creatinine Trend: 7.86 <--, 7.05 <--                        10.6   7.38  )-----------( 203      ( 24 Mar 2025 05:20 )             32.6     Urine Studies:  Urinalysis Basic - ( 24 Mar 2025 05:20 )    Color:  / Appearance:  / SG:  / pH:   Gluc: 183 mg/dL / Ketone:   / Bili:  / Urobili:    Blood:  / Protein:  / Nitrite:    Leuk Esterase:  / RBC:  / WBC    Sq Epi:  / Non Sq Epi:  / Bacteria:         RADIOLOGY & ADDITIONAL STUDIES:

## 2025-03-24 NOTE — PROGRESS NOTE ADULT - PROBLEM SELECTOR PLAN 2
lateral T wave inversions new since prior  prolonged QTc  - admit to telemetry for monitoring  - obtain TTE  - needs cardio evaluation pretransplant   - Cardio Dr. Chapa following

## 2025-03-24 NOTE — PROGRESS NOTE ADULT - PROBLEM SELECTOR PLAN 1
nonseptic on admission  s/p ceftriaxone, azithromycin in the ER  CXR  with RIGHT perihilar and RIGHT middle lobe lung multifocal airspace ill-defined opacities/infiltrates  procalcitonin elevated 0.67  - c/w ceftriaxone x 5 days (last day 3/27)  - c/w azithro x 3 days (last day 3/25), monitor QTc  - f/u BCx from 3/23  - f/u atypical PNA work-up: legionella, strep, mycoplasma  - O2 supplementation, wean as tolerated

## 2025-03-24 NOTE — PROGRESS NOTE ADULT - ATTENDING COMMENTS
#Acute hypoxic respiratory failure likely 2/2 pneumonia vs fluid overload   #Right lobe pneumonia  #TWI on EKG   #ESRD on HD  #HTN  #Sarcoidosis  #DVT ppx     54yF with PMH of ESRD on HD (MWF), sarcoid on prednisone, type 2 DM, HTN, HLD, who presented from home with SOB x1 day. Admitted for AHRF 2/2 right sided pneumonia. Evaluated by ICU for AHRF initially requiring BiPAP, however, stable for tele floor. Patient seen at bedside     PE: as above; vitals reviewed, NAD, abdomen soft/nontender, A+Ox3, no le edema  Labs reviewed: CBC, BMP, Mg, Phos; monitor daily     -tele monitoring   -EKG showing TWI, prior on EKG in March 2024   -c/w ceftriaxone, azithromycin   -atypical workup pending; procalcitonin 0.81   -RVP negative  -CXR showing right upper/middle lobe infiltrates   -no leukocytosis, afebrile  -currently on 4L NC  -titrate to keep saturation >92%   -nephro Dr. Estrada following, planned for HD today per regular schedule   -ACHS FS, ISS; A1c 6.2%   -c/w home meds for chronic conditions, including prednisone 10mg daily for sarcoid   -cardio Dr. Chapa following for TWI seen on EKG, f/u TTE and will need outpatient F/U   -DVT ppx #Acute hypoxic respiratory failure likely 2/2 pneumonia vs fluid overload   #Right lobe pneumonia  #TWI on EKG   #ESRD on HD  #HTN  #Sarcoidosis  #DVT ppx     54yF with PMH of ESRD on HD (MWF), sarcoid on prednisone, type 2 DM, HTN, HLD, who presented from home with SOB x1 day. Admitted for AHRF 2/2 right sided pneumonia. Evaluated by ICU for AHRF initially requiring BiPAP, however, stable for tele floor. Patient seen at bedside, appears short of breath. Denies any other complaints.     PE: as above; vitals reviewed, NAD, abdomen soft/nontender, A+Ox3, no le edema  Labs reviewed: CBC, BMP, Mg, Phos; monitor daily     -tele monitoring   -EKG showing TWI, prior on EKG in March 2024   -c/w ceftriaxone, azithromycin   -atypical workup pending; procalcitonin 0.81   -RVP negative  -CXR showing right upper/middle lobe infiltrates   -no leukocytosis, afebrile  -currently on 4L NC  -titrate to keep saturation >92%   -nephro Dr. Estrada following, planned for HD today per regular schedule   -ACHS FS, ISS; A1c 6.2%   -c/w home meds for chronic conditions, including prednisone 10mg daily for sarcoid   -cardio Dr. Chapa following for TWI seen on EKG, f/u TTE and will need outpatient F/U   -DVT ppx #Acute hypoxic respiratory failure likely 2/2 fluid overload with underlying pneumonia  #Right lobe pneumonia  #TWI on EKG   #ESRD on HD (MWF)  #HTN  #Sarcoidosis  #DVT ppx     54yF with PMH of ESRD on HD (MWF), sarcoid on prednisone, type 2 DM, HTN, HLD, who presented from home with SOB x1 day. Admitted for AHRF 2/2 right sided pneumonia. Evaluated by ICU for AHRF initially requiring BiPAP, however, stable for tele floor. Patient seen at bedside, appears short of breath. Denies any other complaints.     PE: as above; vitals reviewed, NAD, abdomen soft/nontender, A+Ox3, no le edema  Labs reviewed: CBC, BMP, Mg, Phos; monitor daily     -tele monitoring   -EKG showing TWI, prior on EKG in March 2024   -c/w ceftriaxone, azithromycin   -atypical workup pending; procalcitonin 0.81   -RVP negative  -CXR showing right upper/middle lobe infiltrates   -no leukocytosis, afebrile  -currently on 4L NC  -titrate to keep saturation >92%   -nephro Dr. Estrada following, planned for HD today per regular schedule   -ACHS FS, ISS; A1c 6.2%   -c/w home meds for chronic conditions, including prednisone 10mg daily for sarcoid   -cardio Dr. Chapa following for TWI seen on EKG, f/u TTE and will need outpatient F/U   -DVT ppx

## 2025-03-24 NOTE — PROGRESS NOTE ADULT - PROBLEM SELECTOR PLAN 5
hx of HTN on nifedipine 60 qd, metoprolol 25 qd  - c/w home meds w/ holding parameters  - TLC/DASH diet

## 2025-03-24 NOTE — PROGRESS NOTE ADULT - SUBJECTIVE AND OBJECTIVE BOX
CHIEF COMPLAINT: sob     HPI:  54F w sarcoid, DM, HLD, ESRD on HD presents with 1 day of sob. Pt denies cp, LE swelling, calf pain. Reports sleeping on 3 pillows - unchanged. CXR was notable for infiltrates and pt was admitted for tx of PNA. Labs also notable for elevated proBNP and pro calcitonin.  On interview pt reports feeling improved.     PAST MEDICAL & SURGICAL HISTORY:  Sarcoidosis  Diabetes  Hypertension  Hyperlipidemia  Chronic kidney disease (CKD), stage III (moderate)  Seizure disorder    Allergies  No Known Allergies    MEDICATIONS  (STANDING):  azithromycin  IVPB 500 milliGRAM(s) IV Intermittent every 24 hours  cefTRIAXone   IVPB 1000 milliGRAM(s) IV Intermittent every 24 hours  famotidine    Tablet 20 milliGRAM(s) Oral daily  ferrous    sulfate 325 milliGRAM(s) Oral daily  furosemide    Tablet 80 milliGRAM(s) Oral <User Schedule>  gabapentin 300 milliGRAM(s) Oral two times a day  heparin   Injectable 5000 Unit(s) SubCutaneous every 12 hours  insulin lispro (ADMELOG) corrective regimen sliding scale   SubCutaneous three times a day before meals  insulin lispro (ADMELOG) corrective regimen sliding scale   SubCutaneous at bedtime  metoprolol succinate ER 25 milliGRAM(s) Oral daily  NIFEdipine XL 60 milliGRAM(s) Oral daily  predniSONE   Tablet 10 milliGRAM(s) Oral daily  rosuvastatin 20 milliGRAM(s) Oral at bedtime  sevelamer carbonate 1600 milliGRAM(s) Oral three times a day    MEDICATIONS  (PRN):  acetaminophen     Tablet .. 650 milliGRAM(s) Oral every 6 hours PRN Mild Pain (1 - 3)  aluminum hydroxide/magnesium hydroxide/simethicone Suspension 30 milliLiter(s) Oral every 4 hours PRN Dyspepsia  guaiFENesin Oral Liquid (Sugar-Free) 100 milliGRAM(s) Oral every 6 hours PRN Cough  melatonin 3 milliGRAM(s) Oral at bedtime PRN Insomnia  ondansetron Injectable 4 milliGRAM(s) IV Push every 8 hours PRN Nausea and/or Vomiting    FAMILY HISTORY:  Family history of diabetes mellitus in first degree relative  ***No family history of premature coronary artery disease or sudden cardiac death    SOCIAL HISTORY:  Smoking-denies  Alcohol-denies  Illicit Drug use- denies     REVIEW OF SYSTEMS:  Constitutional: [ ] fever, [ ]weight loss,  [ ]fatigue  Eyes: [ ] visual changes  Respiratory: [x ]shortness of breath;  [ ] cough, [ ]wheezing, [ ]chills, [ ]hemoptysis  Cardiovascular: [ ] chest pain, [ ]palpitations, [ ]dizziness,  [ ]leg swelling [ ]syncope  Gastrointestinal: [ ] abdominal pain, [ ]nausea, [ ]vomiting,  [ ]diarrhea   Genitourinary: [ ] dysuria, [ ] hematuria  Neurologic: [ ] headaches [ ] tremors  [ ] weakness [ ] lightheadedness  Skin: [ ] itching, [ ]burning, [ ] rashes  Endocrine: [ ] heat or cold intolerance  Musculoskeletal: [ ] joint pain or swelling; [ ] muscle, back, or extremity pain  Psychiatric: [ ] depression, [ ]anxiety, [ ]mood swings, or [ ]difficulty sleeping  Hematologic: [ ] easy bruising, [ ] bleeding gums     [ x] All others negative	  [ ] Unable to obtain    Vital Signs Last 24 Hrs  T(C): 36.5 (24 Mar 2025 10:00), Max: 37.1 (23 Mar 2025 23:32)  T(F): 97.7 (24 Mar 2025 10:00), Max: 98.7 (23 Mar 2025 23:32)  HR: 87 (24 Mar 2025 10:00) (86 - 105)  BP: 164/93 (24 Mar 2025 10:00) (148/85 - 168/91)  BP(mean): --  RR: 20 (24 Mar 2025 10:00) (16 - 24)  SpO2: 98% (24 Mar 2025 10:00) (95% - 100%)    Parameters below as of 24 Mar 2025 10:00  Patient On (Oxygen Delivery Method): nasal cannula  O2 Flow (L/min): 4    PHYSICAL EXAM:  General: No acute distress  HEENT: EOMI  Neck:  No JVD  Lungs: crackles b/l   Heart: Regular rate and rhythm; No murmurs, rubs, or gallops  Abdomen: soft, non tender, non distended   Extremities: warm, no edema   Nervous system:  Alert & Oriented X3    LABS:  03-24    136  |  101  |  46[H]  ----------------------------<  183[H]  4.5   |  29  |  7.86[H]    Ca    9.1      24 Mar 2025 05:20  Phos  4.5     03-23  Mg     2.6     03-23    TPro  7.8  /  Alb  3.6  /  TBili  0.7  /  DBili  x   /  AST  19  /  ALT  24  /  AlkPhos  88  03-23    Creatinine Trend: 7.86<--, 7.05<--                        10.6   7.38  )-----------( 203      ( 24 Mar 2025 05:20 )             32.6     RADIOLOGY:   CXR   "IMPRESSION:  RIGHT perihilar and RIGHT middle lobe lung multifocal airspace   ill-defined opacities/infiltrates."    ECG [my interpretation]:  3/23/NSR TWI lateral leads      TELEMETRY: no events     ECHO: pending

## 2025-03-24 NOTE — PROGRESS NOTE ADULT - ASSESSMENT
54F w sarcoid, DM, HLD, ESRD on HD, CAD (coronary calcium on CT chest) presents with 1 day of sob found to have PNA on CXR, cardiology c/s for abn ecg     #Abnormal ECG - ECG shows NSR with lateral TWI. Pt denies cp. Of note, prior ECGs reviewed and pt had TWI back in March 2024. Pt has active PNA on imaging and on exam has crackles. She feels improved since Abx have been started. Has no cp.   - f/u TTE  - PNA mgmt per primary team   - pt needs outpt cardiology f/u upon d/c - may need cMRI given sarcoid hx if no contraindications. Please ensure pt has appt scheduled prior to d/c.     #CAD - has coronary calcium on CT chest.  -start statin

## 2025-03-24 NOTE — PATIENT PROFILE ADULT - FALL HARM RISK - HARM RISK INTERVENTIONS

## 2025-03-25 LAB
ALBUMIN SERPL ELPH-MCNC: 3.2 G/DL — LOW (ref 3.5–5)
ALP SERPL-CCNC: 80 U/L — SIGNIFICANT CHANGE UP (ref 40–120)
ALT FLD-CCNC: 20 U/L DA — SIGNIFICANT CHANGE UP (ref 10–60)
ANION GAP SERPL CALC-SCNC: 10 MMOL/L — SIGNIFICANT CHANGE UP (ref 5–17)
APTT BLD: 29.4 SEC — SIGNIFICANT CHANGE UP (ref 24.5–35.6)
AST SERPL-CCNC: 15 U/L — SIGNIFICANT CHANGE UP (ref 10–40)
BILIRUB SERPL-MCNC: 0.4 MG/DL — SIGNIFICANT CHANGE UP (ref 0.2–1.2)
BLD GP AB SCN SERPL QL: SIGNIFICANT CHANGE UP
BUN SERPL-MCNC: 38 MG/DL — HIGH (ref 7–18)
CALCIUM SERPL-MCNC: 9.2 MG/DL — SIGNIFICANT CHANGE UP (ref 8.4–10.5)
CHLORIDE SERPL-SCNC: 97 MMOL/L — SIGNIFICANT CHANGE UP (ref 96–108)
CO2 SERPL-SCNC: 27 MMOL/L — SIGNIFICANT CHANGE UP (ref 22–31)
CREAT SERPL-MCNC: 5.83 MG/DL — HIGH (ref 0.5–1.3)
EGFR: 8 ML/MIN/1.73M2 — LOW
EGFR: 8 ML/MIN/1.73M2 — LOW
FLUAV AG NPH QL: SIGNIFICANT CHANGE UP
FLUBV AG NPH QL: SIGNIFICANT CHANGE UP
GLUCOSE BLDC GLUCOMTR-MCNC: 210 MG/DL — HIGH (ref 70–99)
GLUCOSE BLDC GLUCOMTR-MCNC: 231 MG/DL — HIGH (ref 70–99)
GLUCOSE BLDC GLUCOMTR-MCNC: 268 MG/DL — HIGH (ref 70–99)
GLUCOSE BLDC GLUCOMTR-MCNC: 429 MG/DL — HIGH (ref 70–99)
GLUCOSE SERPL-MCNC: 220 MG/DL — HIGH (ref 70–99)
HCT VFR BLD CALC: 31.3 % — LOW (ref 34.5–45)
HGB BLD-MCNC: 10.6 G/DL — LOW (ref 11.5–15.5)
INR BLD: 0.88 RATIO — SIGNIFICANT CHANGE UP (ref 0.85–1.16)
MAGNESIUM SERPL-MCNC: 2.5 MG/DL — SIGNIFICANT CHANGE UP (ref 1.6–2.6)
MCHC RBC-ENTMCNC: 29.3 PG — SIGNIFICANT CHANGE UP (ref 27–34)
MCHC RBC-ENTMCNC: 33.9 G/DL — SIGNIFICANT CHANGE UP (ref 32–36)
MCV RBC AUTO: 86.5 FL — SIGNIFICANT CHANGE UP (ref 80–100)
NRBC BLD AUTO-RTO: 0 /100 WBCS — SIGNIFICANT CHANGE UP (ref 0–0)
PHOSPHATE SERPL-MCNC: 4.8 MG/DL — HIGH (ref 2.5–4.5)
PLATELET # BLD AUTO: 220 K/UL — SIGNIFICANT CHANGE UP (ref 150–400)
POTASSIUM SERPL-MCNC: 3.9 MMOL/L — SIGNIFICANT CHANGE UP (ref 3.5–5.3)
POTASSIUM SERPL-SCNC: 3.9 MMOL/L — SIGNIFICANT CHANGE UP (ref 3.5–5.3)
PROT SERPL-MCNC: 7.1 G/DL — SIGNIFICANT CHANGE UP (ref 6–8.3)
PROTHROM AB SERPL-ACNC: 10.2 SEC — SIGNIFICANT CHANGE UP (ref 9.9–13.4)
RBC # BLD: 3.62 M/UL — LOW (ref 3.8–5.2)
RBC # FLD: 13 % — SIGNIFICANT CHANGE UP (ref 10.3–14.5)
RSV RNA NPH QL NAA+NON-PROBE: SIGNIFICANT CHANGE UP
S PNEUM AG UR QL: NEGATIVE — SIGNIFICANT CHANGE UP
SARS-COV-2 RNA SPEC QL NAA+PROBE: SIGNIFICANT CHANGE UP
SARS-COV-2 RNA SPEC QL NAA+PROBE: SIGNIFICANT CHANGE UP
SODIUM SERPL-SCNC: 134 MMOL/L — LOW (ref 135–145)
SOURCE RESPIRATORY: SIGNIFICANT CHANGE UP
WBC # BLD: 6.25 K/UL — SIGNIFICANT CHANGE UP (ref 3.8–10.5)
WBC # FLD AUTO: 6.25 K/UL — SIGNIFICANT CHANGE UP (ref 3.8–10.5)

## 2025-03-25 PROCEDURE — 99233 SBSQ HOSP IP/OBS HIGH 50: CPT

## 2025-03-25 PROCEDURE — 99233 SBSQ HOSP IP/OBS HIGH 50: CPT | Mod: GC

## 2025-03-25 RX ORDER — INSULIN LISPRO 100 U/ML
INJECTION, SOLUTION INTRAVENOUS; SUBCUTANEOUS
Refills: 0 | Status: DISCONTINUED | OUTPATIENT
Start: 2025-03-25 | End: 2025-03-25

## 2025-03-25 RX ORDER — INSULIN LISPRO 100 U/ML
INJECTION, SOLUTION INTRAVENOUS; SUBCUTANEOUS
Refills: 0 | Status: DISCONTINUED | OUTPATIENT
Start: 2025-03-25 | End: 2025-03-27

## 2025-03-25 RX ORDER — NIFEDIPINE 30 MG
30 TABLET, EXTENDED RELEASE 24 HR ORAL ONCE
Refills: 0 | Status: COMPLETED | OUTPATIENT
Start: 2025-03-25 | End: 2025-03-25

## 2025-03-25 RX ORDER — SODIUM CHLORIDE 0.65 %
1 AEROSOL, SPRAY (ML) NASAL ONCE
Refills: 0 | Status: COMPLETED | OUTPATIENT
Start: 2025-03-25 | End: 2025-03-25

## 2025-03-25 RX ORDER — NIFEDIPINE 30 MG
90 TABLET, EXTENDED RELEASE 24 HR ORAL DAILY
Refills: 0 | Status: DISCONTINUED | OUTPATIENT
Start: 2025-03-25 | End: 2025-03-27

## 2025-03-25 RX ORDER — INSULIN LISPRO 100 U/ML
INJECTION, SOLUTION INTRAVENOUS; SUBCUTANEOUS AT BEDTIME
Refills: 0 | Status: DISCONTINUED | OUTPATIENT
Start: 2025-03-25 | End: 2025-03-27

## 2025-03-25 RX ADMIN — INSULIN LISPRO 4: 100 INJECTION, SOLUTION INTRAVENOUS; SUBCUTANEOUS at 17:28

## 2025-03-25 RX ADMIN — DEXTROMETHORPHAN HBR, GUAIFENESIN 100 MILLIGRAM(S): 200 LIQUID ORAL at 23:22

## 2025-03-25 RX ADMIN — GABAPENTIN 300 MILLIGRAM(S): 400 CAPSULE ORAL at 06:47

## 2025-03-25 RX ADMIN — SEVELAMER HYDROCHLORIDE 1600 MILLIGRAM(S): 800 TABLET ORAL at 06:47

## 2025-03-25 RX ADMIN — Medication 20 MILLIGRAM(S): at 12:04

## 2025-03-25 RX ADMIN — FUROSEMIDE 80 MILLIGRAM(S): 10 INJECTION INTRAMUSCULAR; INTRAVENOUS at 09:18

## 2025-03-25 RX ADMIN — HEPARIN SODIUM 5000 UNIT(S): 1000 INJECTION INTRAVENOUS; SUBCUTANEOUS at 17:28

## 2025-03-25 RX ADMIN — SEVELAMER HYDROCHLORIDE 1600 MILLIGRAM(S): 800 TABLET ORAL at 13:10

## 2025-03-25 RX ADMIN — Medication 3 MILLIGRAM(S): at 22:01

## 2025-03-25 RX ADMIN — ROSUVASTATIN CALCIUM 20 MILLIGRAM(S): 5 TABLET, FILM COATED ORAL at 22:01

## 2025-03-25 RX ADMIN — Medication 60 MILLIGRAM(S): at 06:09

## 2025-03-25 RX ADMIN — GABAPENTIN 300 MILLIGRAM(S): 400 CAPSULE ORAL at 17:29

## 2025-03-25 RX ADMIN — Medication 250 MILLIGRAM(S): at 06:09

## 2025-03-25 RX ADMIN — PREDNISONE 10 MILLIGRAM(S): 20 TABLET ORAL at 06:10

## 2025-03-25 RX ADMIN — Medication 90 MILLIGRAM(S): at 22:01

## 2025-03-25 RX ADMIN — INSULIN LISPRO 12: 100 INJECTION, SOLUTION INTRAVENOUS; SUBCUTANEOUS at 12:02

## 2025-03-25 RX ADMIN — Medication 1 SPRAY(S): at 23:22

## 2025-03-25 RX ADMIN — SEVELAMER HYDROCHLORIDE 1600 MILLIGRAM(S): 800 TABLET ORAL at 22:00

## 2025-03-25 RX ADMIN — METOPROLOL SUCCINATE 25 MILLIGRAM(S): 50 TABLET, EXTENDED RELEASE ORAL at 06:09

## 2025-03-25 RX ADMIN — HEPARIN SODIUM 5000 UNIT(S): 1000 INJECTION INTRAVENOUS; SUBCUTANEOUS at 06:09

## 2025-03-25 RX ADMIN — Medication 30 MILLIGRAM(S): at 12:03

## 2025-03-25 RX ADMIN — CEFTRIAXONE 100 MILLIGRAM(S): 500 INJECTION, POWDER, FOR SOLUTION INTRAMUSCULAR; INTRAVENOUS at 18:45

## 2025-03-25 NOTE — PROGRESS NOTE ADULT - PROBLEM SELECTOR PLAN 6
hx of DM, stopped insulin because her primary care doctor said her A1c was in good range  not currently taking any medication  hba1c 6.9 in Nov 2024  - f/u hba1c   - ISS, adjust as indicated  - monitor fingersticks   - diabetic diet hx of DM, stopped insulin because her primary care doctor said her A1c was in good range  not currently taking any medication  hba1c 6.9 in Nov 2024  hba1c 6.2 on this admission  - adjust ISS to moderate sliding scale (on steroids)  - monitor fingersticks   - diabetic diet

## 2025-03-25 NOTE — PROGRESS NOTE ADULT - PROBLEM SELECTOR PLAN 2
lateral T wave inversions new since prior  prolonged QTc  - admit to telemetry for monitoring  - obtain TTE  - needs cardio evaluation pretransplant   - Cardio Dr. Chapa following lateral T wave inversions new since prior  prolonged QTc  TTE LVEF 55-60%, M2DD, thickened anterior MV leaflet, with oval shaped echodensity ~1.1cm x 0.51cm on the left ventricular aspect  - scheduled for LINDA 3/26 r/o endocarditis, NPO after midnight   - telemetry for monitoring  - cards Dr. Chapa following

## 2025-03-25 NOTE — PROGRESS NOTE ADULT - PROBLEM SELECTOR PLAN 5
hx of HTN on nifedipine 60 qd, metoprolol 25 qd  - c/w home meds w/ holding parameters  - TLC/DASH diet hx of HTN on nifedipine 60 qd, metoprolol 25 qd  -160s  - increase to nifedipine 90 qd for adequate BP control  - c/w metoprolol 25 qd  - TLC/DASH diet

## 2025-03-25 NOTE — PROGRESS NOTE ADULT - SUBJECTIVE AND OBJECTIVE BOX
NEPHROLOGY MEDICAL CARE, [facility] - Dr. Giorgi Romero/ Dr. Delroy Gutierrez/ Dr. Shahram Monaco/ Dr. Alfonso Rosario    Date of Service: 03-25-25    Patient was seen and examined at bedside.    CC: patient has no sob  pt goes to [facility] under Dr. Gutierrez care      Vital Signs Last 24 Hrs  T(C): 36.5 (25 Mar 2025 15:33), Max: 37.3 (24 Mar 2025 23:27)  T(F): 97.7 (25 Mar 2025 15:33), Max: 99.1 (24 Mar 2025 23:27)  HR: 88 (25 Mar 2025 15:33) (84 - 90)  BP: 153/88 (25 Mar 2025 15:33) (146/79 - 161/79)  BP(mean): --  RR: 19 (25 Mar 2025 15:33) (18 - 19)  SpO2: 95% (25 Mar 2025 15:33) (95% - 100%)    Parameters below as of 25 Mar 2025 15:33  Patient On (Oxygen Delivery Method): room air        03-24 @ 07:01  -  03-25 @ 07:00  --------------------------------------------------------  IN: 0 mL / OUT: 200 mL / NET: -200 mL        PHYSICAL EXAM:  GENERAL: No acute respiratory distress.  HEAD:  Atraumatic, Normocephalic  EYES: conjunctiva and sclera clear  ENMT: Moist mucous membranes  NECK: Supple, No JVD  NERVOUS SYSTEM:  Awake, Alert & Oriented X3,   CHEST/LUNG: Clear to percussion bilaterally; No rales, rhonchi, wheezing  HEART: Regular rate and rhythm; No murmurs, rubs, or gallops  ABDOMEN: Soft, Non-tender, Nondistended; Bowel sounds present  EXTREMITIES:  No cyanosis, or edema  SKIN: No rashes or lesions  DIALYSIS ACCESS:  Right PERMACATH        MEDICATIONS:  MEDICATIONS  (STANDING):  cefTRIAXone   IVPB 1000 milliGRAM(s) IV Intermittent every 24 hours  famotidine    Tablet 20 milliGRAM(s) Oral daily  ferrous    sulfate 325 milliGRAM(s) Oral daily  furosemide    Tablet 80 milliGRAM(s) Oral <User Schedule>  gabapentin 300 milliGRAM(s) Oral two times a day  heparin   Injectable 5000 Unit(s) SubCutaneous every 12 hours  insulin lispro (ADMELOG) corrective regimen sliding scale   SubCutaneous three times a day before meals  insulin lispro (ADMELOG) corrective regimen sliding scale   SubCutaneous at bedtime  metoprolol succinate ER 25 milliGRAM(s) Oral daily  NIFEdipine XL 90 milliGRAM(s) Oral daily  predniSONE   Tablet 10 milliGRAM(s) Oral daily  rosuvastatin 20 milliGRAM(s) Oral at bedtime  sevelamer carbonate 1600 milliGRAM(s) Oral three times a day    MEDICATIONS  (PRN):  acetaminophen     Tablet .. 650 milliGRAM(s) Oral every 6 hours PRN Mild Pain (1 - 3)  aluminum hydroxide/magnesium hydroxide/simethicone Suspension 30 milliLiter(s) Oral every 4 hours PRN Dyspepsia  guaiFENesin Oral Liquid (Sugar-Free) 100 milliGRAM(s) Oral every 6 hours PRN Cough  melatonin 3 milliGRAM(s) Oral at bedtime PRN Insomnia  ondansetron Injectable 4 milliGRAM(s) IV Push every 8 hours PRN Nausea and/or Vomiting          LABS:                        10.6   6.25  )-----------( 220      ( 25 Mar 2025 06:15 )             31.3     03-25    134[L]  |  97  |  38[H]  ----------------------------<  220[H]  3.9   |  27  |  5.83[H]    Ca    9.2      25 Mar 2025 06:15  Phos  4.8     03-25  Mg     2.5     03-25    TPro  7.1  /  Alb  3.2[L]  /  TBili  0.4  /  DBili  x   /  AST  15  /  ALT  20  /  AlkPhos  80  03-25    PT/INR - ( 25 Mar 2025 06:15 )   PT: 10.2 sec;   INR: 0.88 ratio         PTT - ( 25 Mar 2025 06:15 )  PTT:29.4 sec  Urinalysis Basic - ( 25 Mar 2025 06:15 )    Color: x / Appearance: x / SG: x / pH: x  Gluc: 220 mg/dL / Ketone: x  / Bili: x / Urobili: x   Blood: x / Protein: x / Nitrite: x   Leuk Esterase: x / RBC: x / WBC x   Sq Epi: x / Non Sq Epi: x / Bacteria: x      Magnesium: 2.5 mg/dL (03-25 @ 06:15)  Phosphorus: 4.8 mg/dL (03-25 @ 06:15)    Urine studies    PTH and Vit D:

## 2025-03-25 NOTE — PROGRESS NOTE ADULT - PROBLEM SELECTOR PLAN 4
hx of ESRD on HD MWF   s/p right permacath in 09/2024  last HD Friday 3/21  on renal transplant list  follows with Dr. Duke  - scheduled for HD today, wean off O2 as tolerated  - renal diet, fluid restriction  - c/w sevelamer  - c/w Lasix on nondialysis days  - nephro Dr. Estrada following hx of ESRD on HD MWF   s/p right permacath in 09/2024  last HD Friday 3/21  on renal transplant list  follows with Dr. Duke  s/p HD 3/24, now off O2, sat well on RA   - renal diet, fluid restriction 1.2 L  - c/w sevelamer  - c/w PO lasix on nondialysis days  - increase nifedipine to 90 mg qd for adequate BP control  - nephro Dr. Estrada following

## 2025-03-25 NOTE — PROGRESS NOTE ADULT - ATTENDING COMMENTS
Patient seen and examined.  Case discussed with house staff.  Agree with above as edited.   Patient is a 54yoF with history of sarcoidosis, on prednisone, DM, HTN, HLD, ESRD on HD [[facility] s/p R [facility] 09/2024], p/w worsening SOB x 1 day. Found to have PNA.   PNA - c/w Ceftriaxone x 5 day course  Echodensity - seen on TTE - plan for LINDA in am  HTN - will increase nifedipine. c/w toprol  d/w cardiology  Sarcoidosis - c/w predniosone  ESRD - HD per nephrology. c/w sevelamer Patient seen and examined.  Case discussed with house staff.  Agree with above as edited.   Patient is a 54yoF with history of sarcoidosis, on prednisone, DM, HTN, HLD, ESRD on HD [[facility] s/p R [facility] 09/2024], p/w worsening SOB x 1 day. Found to have PNA.   PNA - c/w Ceftriaxone x 5 day course  Echodensity - seen on TTE - plan for LINDA in am  HTN - will increase nifedipine. c/w toprol  d/w cardiology  Sarcoidosis - c/w predniosone  ESRD - HD per nephrology. c/w sevelamer  Team d/w family  Case d/w Case management, social work and nursing on IDRs

## 2025-03-25 NOTE — PROGRESS NOTE ADULT - ASSESSMENT
54F w sarcoid, DM, HLD, ESRD on HD, CAD (coronary calcium on CT chest) presents with 1 day of sob found to have PNA on CXR, cardiology c/s for abn ecg     #Abnormal ECG - ECG shows NSR with lateral TWI. Pt denies cp. Of note, prior ECGs reviewed and pt had TWI back in March 2024. Pt has active PNA on imaging and on exam has crackles. She feels improved since Abx have been started. Has no cp.   - TTE with EF 55-60%. No wall motion abnormalities. Thickened anterior mitral valve leaflet, with oval shaped echodensity ~1.1cm x 0.51cm on the left ventricular aspect.  -LINDA recommended for further evaluation  - PNA mgmt per primary team   - pt needs outpt cardiology f/u upon d/c - may need cMRI given sarcoid hx if no contraindications. Please ensure pt has appt scheduled prior to d/c.     #CAD - has coronary calcium on CT chest.  -start statin

## 2025-03-25 NOTE — PROGRESS NOTE ADULT - SUBJECTIVE AND OBJECTIVE BOX
PRESENTING CC: SOB    OVERNIGHT EVENTS: No new events overnight.  Patient denies sob, chest pain, palpitations      PMH:   PAST MEDICAL & SURGICAL HISTORY:  Sarcoidosis    Diabetes    Hypertension    Hyperlipidemia    Chronic kidney disease (CKD), stage III (moderate)    Seizure disorder    No significant past surgical history        Allergies    No Known Allergies    Intolerances        MEDICATIONS  (STANDING):  cefTRIAXone   IVPB 1000 milliGRAM(s) IV Intermittent every 24 hours  famotidine    Tablet 20 milliGRAM(s) Oral daily  ferrous    sulfate 325 milliGRAM(s) Oral daily  furosemide    Tablet 80 milliGRAM(s) Oral <User Schedule>  gabapentin 300 milliGRAM(s) Oral two times a day  heparin   Injectable 5000 Unit(s) SubCutaneous every 12 hours  insulin lispro (ADMELOG) corrective regimen sliding scale   SubCutaneous three times a day before meals  insulin lispro (ADMELOG) corrective regimen sliding scale   SubCutaneous at bedtime  metoprolol succinate ER 25 milliGRAM(s) Oral daily  NIFEdipine XL 90 milliGRAM(s) Oral daily  predniSONE   Tablet 10 milliGRAM(s) Oral daily  rosuvastatin 20 milliGRAM(s) Oral at bedtime  sevelamer carbonate 1600 milliGRAM(s) Oral three times a day    MEDICATIONS  (PRN):  acetaminophen     Tablet .. 650 milliGRAM(s) Oral every 6 hours PRN Mild Pain (1 - 3)  aluminum hydroxide/magnesium hydroxide/simethicone Suspension 30 milliLiter(s) Oral every 4 hours PRN Dyspepsia  guaiFENesin Oral Liquid (Sugar-Free) 100 milliGRAM(s) Oral every 6 hours PRN Cough  melatonin 3 milliGRAM(s) Oral at bedtime PRN Insomnia  ondansetron Injectable 4 milliGRAM(s) IV Push every 8 hours PRN Nausea and/or Vomiting      FAMILY HISTORY:  Family history of diabetes mellitus in first degree relative      Reviewed; no change from my prior note    SOCIAL HISTORY:  Reviewed, no change from my prior note    REVIEW OF SYSTEMS:  Constitutional: [ ] fever, [ ]weight loss,  [ ]fatigue  Eyes: [ ] visual changes  Respiratory: [ ]shortness of breath;  [ ] cough, [ ]wheezing, [ ]chills, [ ]hemoptysis  Cardiovascular: [ ] chest pain, [ ]palpitations, [ ]dizziness,  [ ]leg swelling [ ]syncope  Gastrointestinal: [ ] abdominal pain, [ ]nausea, [ ]vomiting,  [ ]diarrhea   Genitourinary: [ ] dysuria, [ ] hematuria  Neurologic: [ ] headaches [ ] tremors [ ] weakness [ ] lightheadedness  Skin: [ ] itching, [ ]burning, [ ] rashes  Endocrine: [ ] heat or cold intolerance  Musculoskeletal: [ ] joint pain or swelling; [ ] muscle, back, or extremity pain  Psychiatric: [ ] depression, [ ]anxiety, [ ]mood swings, or [ ]difficulty sleeping  Hematologic: [ ] easy bruising, [ ] bleeding gums    [x] All remaining systems negative except as per above.   [  ] Unable to obtain    Vital Signs Last 24 Hrs  T(C): 36.9 (25 Mar 2025 11:02), Max: 37.3 (24 Mar 2025 23:27)  T(F): 98.4 (25 Mar 2025 11:02), Max: 99.1 (24 Mar 2025 23:27)  HR: 84 (25 Mar 2025 11:02) (84 - 91)  BP: 147/79 (25 Mar 2025 11:02) (146/79 - 167/87)  BP(mean): --  RR: 18 (25 Mar 2025 11:02) (18 - 19)  SpO2: 99% (25 Mar 2025 11:02) (90% - 100%)    Parameters below as of 25 Mar 2025 11:02  Patient On (Oxygen Delivery Method): room air  O2 Flow (L/min): 2    I&O's Summary    24 Mar 2025 07:01  -  25 Mar 2025 07:00  --------------------------------------------------------  IN: 0 mL / OUT: 200 mL / NET: -200 mL        PHYSICAL EXAM:  General: No acute distress  HEENT: EOMI  Neck: No JVD  Lungs: + crackles bilateral >right side. No rales or wheezing  Heart: Regular rate and rhythm; No murmurs, rubs, or gallops  Abdomen: Soft, non tender, non distended   Extremities: Warm, no edema   Nervous system:  Alert & Oriented X3  Psychiatric: Normal affect  Skin: No rashes or lesions    LABS:  03-25    134[L]  |  97  |  38[H]  ----------------------------<  220[H]  3.9   |  27  |  5.83[H]    Ca    9.2      25 Mar 2025 06:15  Phos  4.8     03-25  Mg     2.5     03-25    TPro  7.1  /  Alb  3.2[L]  /  TBili  0.4  /  DBili  x   /  AST  15  /  ALT  20  /  AlkPhos  80  03-25    Creatinine Trend: 5.83<--, 7.86<--, 7.05<--                        10.6   6.25  )-----------( 220      ( 25 Mar 2025 06:15 )             31.3     PT/INR - ( 25 Mar 2025 06:15 )   PT: 10.2 sec;   INR: 0.88 ratio         PTT - ( 25 Mar 2025 06:15 )  PTT:29.4 sec  Lipid Panel:   Cardiac Enzymes:           RADIOLOGY:   CXR   "IMPRESSION:  RIGHT perihilar and RIGHT middle lobe lung multifocal airspace   ill-defined opacities/infiltrates."    ECG [my interpretation]:  3/23/NSR TWI lateral leads      TELEMETRY: no events     ECHO: < from: TTE W or WO Ultrasound Enhancing Agent (03.24.25 @ 16:00) >  CONCLUSIONS:      1. Left ventricular cavity is normal in size. Left ventricular wall thickness is mildly increased. Left ventricular systolic function is normal with an ejection fraction visually estimated at 55 to 60 %. There are no regional wall motion abnormalities seen.   2. Basal septal hypertrophy (measures 1.2 cm) without evidence of left ventricular outflow tract obstruction.   3. There is moderate (grade 2) left ventricular diastolic dysfunction.   4. Normal right ventricular cavity size and normal right ventricular systolic function.   5. Trileaflet aortic valve. There is focal calcification of the aortic valve leaflets. No aortic valve stenosis.   6. Trace aortic regurgitation.   7. There is mild calcification of the mitral valve annulus.   8. Thickened anterior mitral valve leaflet, with oval shaped echodensity ~1.1cm x 0.51cm on the left ventricular aspect.   9. Trace mitral regurgitation.  10. Pulmonary artery systolic pressure could not be estimated.  11. Small right pleural effusion noted.  12. Trace pericardial effusion noted adjacent to the right atrium.  13. Findings were discussed with Dr Willis on 3/24/2025. No prior echocardiogram is available for comparison.    ____________________________________________________________________  Recommendations:  Recommend transesophageal echocardiogram.    < end of copied text >

## 2025-03-25 NOTE — PROGRESS NOTE ADULT - ASSESSMENT
1. ESRD on HD (M/W/F)  -s/p HD yesterday with UF 2.3kg and plan for HD tomorrow and HD orders were written and discussed with dialysis nurse. Her edw 49.5kg.  -Hemodialysis Renal Diet and Fluid restriction to 1L/day  -Adjust meds to eGFR and avoid IV Gadolinium contrast,NSAIDs, and phosphate enema.  -Monitor Electrolytes daily.  2. Anemia of ESRD:  -order Epogen if hb <10  -F/u CBC daily  -transfuse if HB < 7.0.  3. HTN:   -bp is acceptable. nifedipine was increased.  -continue bp meds  -titrate bp meds to keep sbp >110 and < 130  4. Mineral Bone Disease:  -phos is okay  -continue renvela.   5. Sob possible combination of pneumonia with volume overload  -continue azithromycin and rocephin  -f/u cultures.    d/w patient.

## 2025-03-25 NOTE — PROGRESS NOTE ADULT - SUBJECTIVE AND OBJECTIVE BOX
PGY-1 Progress Note discussed with attending    PAGER #: [[phone]] TILL 5:00 PM  PLEASE CONTACT ON CALL TEAM:  - On Call Team (Please refer to Aline) FROM 5:00 PM - 8:30PM  - Nightfloat Team FROM 8:30 -7:30 AM    INTERVAL HPI/OVERNIGHT EVENTS:   -     REVIEW OF SYSTEMS:  CONSTITUTIONAL: No fever, weight loss, or fatigue  RESPIRATORY: No cough, wheezing, chills or hemoptysis; No shortness of breath  CARDIOVASCULAR: No chest pain, palpitations, dizziness, or leg swelling  GASTROINTESTINAL: No abdominal pain. No nausea, vomiting, or hematemesis; No diarrhea or constipation. No melena or hematochezia.  GENITOURINARY: No dysuria or hematuria, urinary frequency  NEUROLOGICAL: No headaches, memory loss, loss of strength, numbness, or tremors  SKIN: No itching, burning, rashes, or lesions     MEDICATIONS  (STANDING):  azithromycin  IVPB 500 milliGRAM(s) IV Intermittent every 24 hours  cefTRIAXone   IVPB 1000 milliGRAM(s) IV Intermittent every 24 hours  famotidine    Tablet 20 milliGRAM(s) Oral daily  ferrous    sulfate 325 milliGRAM(s) Oral daily  furosemide    Tablet 80 milliGRAM(s) Oral <User Schedule>  gabapentin 300 milliGRAM(s) Oral two times a day  heparin   Injectable 5000 Unit(s) SubCutaneous every 12 hours  insulin lispro (ADMELOG) corrective regimen sliding scale   SubCutaneous three times a day before meals  insulin lispro (ADMELOG) corrective regimen sliding scale   SubCutaneous at bedtime  metoprolol succinate ER 25 milliGRAM(s) Oral daily  NIFEdipine XL 60 milliGRAM(s) Oral daily  predniSONE   Tablet 10 milliGRAM(s) Oral daily  rosuvastatin 20 milliGRAM(s) Oral at bedtime  sevelamer carbonate 1600 milliGRAM(s) Oral three times a day    MEDICATIONS  (PRN):  acetaminophen     Tablet .. 650 milliGRAM(s) Oral every 6 hours PRN Mild Pain (1 - 3)  aluminum hydroxide/magnesium hydroxide/simethicone Suspension 30 milliLiter(s) Oral every 4 hours PRN Dyspepsia  guaiFENesin Oral Liquid (Sugar-Free) 100 milliGRAM(s) Oral every 6 hours PRN Cough  melatonin 3 milliGRAM(s) Oral at bedtime PRN Insomnia  ondansetron Injectable 4 milliGRAM(s) IV Push every 8 hours PRN Nausea and/or Vomiting      Vital Signs Last 24 Hrs  T(C): 36.9 (25 Mar 2025 07:10), Max: 37.3 (24 Mar 2025 23:27)  T(F): 98.4 (25 Mar 2025 07:10), Max: 99.1 (24 Mar 2025 23:27)  HR: 87 (25 Mar 2025 07:10) (85 - 91)  BP: 158/83 (25 Mar 2025 07:10) (146/79 - 188/96)  BP(mean): --  RR: 19 (25 Mar 2025 07:10) (18 - 20)  SpO2: 100% (25 Mar 2025 07:10) (90% - 100%)    Parameters below as of 25 Mar 2025 07:10  Patient On (Oxygen Delivery Method): nasal cannula  O2 Flow (L/min): 3      PHYSICAL EXAMINATION:  GENERAL: NAD, well-groomed, well-developed, lying in bed comfortably  HEAD:  Atraumatic, normocephalic  EYES: EOMI, PERRLA, conjunctiva and sclera clear  ENT: Moist mucous membranes, good dentition, no lesions  NECK: Supple, normal appearance, no JVD, normal thyroid, trachea midline  CHEST/LUNG: Clear to auscultation bilaterally; no rales, rhonchi, wheezing, or rubs; no respiratory distress, no use of accessory muscles  HEART: Regular rate and rhythm; no murmurs, rubs, or gallops  ABDOMEN: Soft, nontender, nondistended; no rebound, no guarding, no palpable masses; bowel sounds present  GENITOURINARY: No suprapubic tenderness, no CVA tenderness  EXTREMITIES/MSK: 2+ peripheral pulses, brisk capillary refill; no clubbing, cyanosis, or edema  NERVOUS SYSTEM:  Alert & Oriented X3, speech clear, no deficits; all 4 extremities, full and equal 5/5 strength; DTRs 2+ intact and symmetric   LYMPH: No lymphadenopathy noted  SKIN: Warm, dry, no rashes or lesions                            10.6   6.25  )-----------( 220      ( 25 Mar 2025 06:15 )             31.3     03-25    134[L]  |  97  |  38[H]  ----------------------------<  220[H]  3.9   |  27  |  5.83[H]    Ca    9.2      25 Mar 2025 06:15  Phos  4.8     03-25  Mg     2.5     03-25    TPro  7.1  /  Alb  3.2[L]  /  TBili  0.4  /  DBili  x   /  AST  15  /  ALT  20  /  AlkPhos  80  03-25    LIVER FUNCTIONS - ( 25 Mar 2025 06:15 )  Alb: 3.2 g/dL / Pro: 7.1 g/dL / ALK PHOS: 80 U/L / ALT: 20 U/L DA / AST: 15 U/L / GGT: x               PT/INR - ( 25 Mar 2025 06:15 )   PT: 10.2 sec;   INR: 0.88 ratio         PTT - ( 25 Mar 2025 06:15 )  PTT:29.4 sec      CAPILLARY BLOOD GLUCOSE      POCT Blood Glucose.: 268 mg/dL (25 Mar 2025 07:30)  POCT Blood Glucose.: 156 mg/dL (24 Mar 2025 20:54)  POCT Blood Glucose.: 294 mg/dL (24 Mar 2025 16:57)  POCT Blood Glucose.: 143 mg/dL (24 Mar 2025 11:06)      RADIOLOGY & ADDITIONAL TESTS:       PGY-1 Progress Note discussed with attending    PAGER #: [[phone]] TILL 5:00 PM  PLEASE CONTACT ON CALL TEAM:  - On Call Team (Please refer to Aline) FROM 5:00 PM - 8:30PM  - Nightfloat Team FROM 8:30 -7:30 AM    INTERVAL HPI/OVERNIGHT EVENTS:   - No acute events overnight. Patient was NPO after midnight for planned LINDA today, however, patient ate at 7 AM. Patient examined at bedside this AM. Patient denies acute complaints, denies orthopnea, remains on 3L NC. Patient was able to ambulate to the bathroom by herself.     REVIEW OF SYSTEMS:  CONSTITUTIONAL: No fever, weight loss, or fatigue  RESPIRATORY: No cough, chills or; No shortness of breath  CARDIOVASCULAR: No chest pain, palpitations, dizziness, or leg swelling  GASTROINTESTINAL: No abdominal pain. No nausea, vomiting; No diarrhea or constipation.   GENITOURINARY: No dysuria or hematuria, urinary frequency  NEUROLOGICAL: No headaches, loss of strength, numbness, or tremors  SKIN: No itching, burning, rashes, or lesions     MEDICATIONS  (STANDING):  azithromycin  IVPB 500 milliGRAM(s) IV Intermittent every 24 hours  cefTRIAXone   IVPB 1000 milliGRAM(s) IV Intermittent every 24 hours  famotidine    Tablet 20 milliGRAM(s) Oral daily  ferrous    sulfate 325 milliGRAM(s) Oral daily  furosemide    Tablet 80 milliGRAM(s) Oral <User Schedule>  gabapentin 300 milliGRAM(s) Oral two times a day  heparin   Injectable 5000 Unit(s) SubCutaneous every 12 hours  insulin lispro (ADMELOG) corrective regimen sliding scale   SubCutaneous three times a day before meals  insulin lispro (ADMELOG) corrective regimen sliding scale   SubCutaneous at bedtime  metoprolol succinate ER 25 milliGRAM(s) Oral daily  NIFEdipine XL 60 milliGRAM(s) Oral daily  predniSONE   Tablet 10 milliGRAM(s) Oral daily  rosuvastatin 20 milliGRAM(s) Oral at bedtime  sevelamer carbonate 1600 milliGRAM(s) Oral three times a day    MEDICATIONS  (PRN):  acetaminophen     Tablet .. 650 milliGRAM(s) Oral every 6 hours PRN Mild Pain (1 - 3)  aluminum hydroxide/magnesium hydroxide/simethicone Suspension 30 milliLiter(s) Oral every 4 hours PRN Dyspepsia  guaiFENesin Oral Liquid (Sugar-Free) 100 milliGRAM(s) Oral every 6 hours PRN Cough  melatonin 3 milliGRAM(s) Oral at bedtime PRN Insomnia  ondansetron Injectable 4 milliGRAM(s) IV Push every 8 hours PRN Nausea and/or Vomiting      Vital Signs Last 24 Hrs  T(C): 36.9 (25 Mar 2025 07:10), Max: 37.3 (24 Mar 2025 23:27)  T(F): 98.4 (25 Mar 2025 07:10), Max: 99.1 (24 Mar 2025 23:27)  HR: 87 (25 Mar 2025 07:10) (85 - 91)  BP: 158/83 (25 Mar 2025 07:10) (146/79 - 188/96)  BP(mean): --  RR: 19 (25 Mar 2025 07:10) (18 - 20)  SpO2: 100% (25 Mar 2025 07:10) (90% - 100%)    Parameters below as of 25 Mar 2025 07:10  Patient On (Oxygen Delivery Method): nasal cannula  O2 Flow (L/min): 3      PHYSICAL EXAMINATION:  GENERAL: NAD, well-groomed, well-developed  HEAD:  Atraumatic, normocephalic  EYES: Conjunctiva and sclera clear  ENT: Moist mucous membranes  NECK: Supple, normal appearance  CHEST/LUNG: B/L crackles, no respiratory distress, on 3L NC   HEART: Regular rate and rhythm; no murmurs, rubs, or gallops  ABDOMEN: Soft, nontender, nondistended; bowel sounds present  GENITOURINARY: No suprapubic tenderness  EXTREMITIES/MSK: 2+ peripheral pulses, no LE edema   NERVOUS SYSTEM:  Alert & Oriented X3, speech clear, no deficits  SKIN: Warm, dry, no rashes or lesions                          10.6   6.25  )-----------( 220      ( 25 Mar 2025 06:15 )             31.3     03-25    134[L]  |  97  |  38[H]  ----------------------------<  220[H]  3.9   |  27  |  5.83[H]    Ca    9.2      25 Mar 2025 06:15  Phos  4.8     03-25  Mg     2.5     03-25    TPro  7.1  /  Alb  3.2[L]  /  TBili  0.4  /  DBili  x   /  AST  15  /  ALT  20  /  AlkPhos  80  03-25    LIVER FUNCTIONS - ( 25 Mar 2025 06:15 )  Alb: 3.2 g/dL / Pro: 7.1 g/dL / ALK PHOS: 80 U/L / ALT: 20 U/L DA / AST: 15 U/L / GGT: x             PT/INR - ( 25 Mar 2025 06:15 )   PT: 10.2 sec;   INR: 0.88 ratio    PTT - ( 25 Mar 2025 06:15 )  PTT:29.4 sec      CAPILLARY BLOOD GLUCOSE  POCT Blood Glucose.: 268 mg/dL (25 Mar 2025 07:30)  POCT Blood Glucose.: 156 mg/dL (24 Mar 2025 20:54)  POCT Blood Glucose.: 294 mg/dL (24 Mar 2025 16:57)  POCT Blood Glucose.: 143 mg/dL (24 Mar 2025 11:06)

## 2025-03-25 NOTE — PROGRESS NOTE ADULT - ASSESSMENT
54 F, from home, PMHx  sarcoidosis [on prednisone], DM, HTN, HLD, ESRD on HD [MWF s/p R permacath 09/2024], p/w worsening SOB x 1 day. CXR R perihilar and middle lobe lung multifocal PNA. Admitted for CAP.

## 2025-03-25 NOTE — PROGRESS NOTE ADULT - PROBLEM SELECTOR PLAN 1
nonseptic on admission  s/p ceftriaxone, azithromycin in the ER  CXR  with RIGHT perihilar and RIGHT middle lobe lung multifocal airspace ill-defined opacities/infiltrates  procalcitonin elevated 0.67  - c/w ceftriaxone x 5 days (last day 3/27)  - c/w azithro x 3 days (last day 3/25), monitor QTc  - f/u BCx from 3/23  - f/u atypical PNA work-up: legionella, strep, mycoplasma  - O2 supplementation, wean as tolerated nonseptic on admission  s/p ceftriaxone, azithromycin in the ER  CXR  with RIGHT perihilar and RIGHT middle lobe lung multifocal airspace ill-defined opacities/infiltrates  procalcitonin elevated 0.67  s/p azithro x 3 days (last day 3/25)  BCx neg NGTD @ 24 hrs   legionella neg  weaned patient off O2, sat 94 on RA   - c/w ceftriaxone x 5 days (last day 3/27)  - f/u atypical PNA work-up: strep, mycoplasma

## 2025-03-26 ENCOUNTER — RESULT REVIEW (OUTPATIENT)
Age: 55
End: 2025-03-26

## 2025-03-26 DIAGNOSIS — Q24.8 OTHER SPECIFIED CONGENITAL MALFORMATIONS OF HEART: ICD-10-CM

## 2025-03-26 LAB
ANION GAP SERPL CALC-SCNC: 10 MMOL/L — SIGNIFICANT CHANGE UP (ref 5–17)
BUN SERPL-MCNC: 70 MG/DL — HIGH (ref 7–18)
CALCIUM SERPL-MCNC: 8.7 MG/DL — SIGNIFICANT CHANGE UP (ref 8.4–10.5)
CHLORIDE SERPL-SCNC: 98 MMOL/L — SIGNIFICANT CHANGE UP (ref 96–108)
CO2 SERPL-SCNC: 26 MMOL/L — SIGNIFICANT CHANGE UP (ref 22–31)
CREAT SERPL-MCNC: 8.5 MG/DL — HIGH (ref 0.5–1.3)
EGFR: 5 ML/MIN/1.73M2 — LOW
EGFR: 5 ML/MIN/1.73M2 — LOW
GLUCOSE BLDC GLUCOMTR-MCNC: 113 MG/DL — HIGH (ref 70–99)
GLUCOSE BLDC GLUCOMTR-MCNC: 274 MG/DL — HIGH (ref 70–99)
GLUCOSE BLDC GLUCOMTR-MCNC: 304 MG/DL — HIGH (ref 70–99)
GLUCOSE BLDC GLUCOMTR-MCNC: 337 MG/DL — HIGH (ref 70–99)
GLUCOSE BLDC GLUCOMTR-MCNC: 347 MG/DL — HIGH (ref 70–99)
GLUCOSE SERPL-MCNC: 263 MG/DL — HIGH (ref 70–99)
HCT VFR BLD CALC: 30.1 % — LOW (ref 34.5–45)
HGB BLD-MCNC: 10.1 G/DL — LOW (ref 11.5–15.5)
M PNEUMO IGM SER-ACNC: 0.99 INDEX — HIGH (ref 0–0.9)
MAGNESIUM SERPL-MCNC: 2.8 MG/DL — HIGH (ref 1.6–2.6)
MCHC RBC-ENTMCNC: 28.9 PG — SIGNIFICANT CHANGE UP (ref 27–34)
MCHC RBC-ENTMCNC: 33.6 G/DL — SIGNIFICANT CHANGE UP (ref 32–36)
MCV RBC AUTO: 86 FL — SIGNIFICANT CHANGE UP (ref 80–100)
MYCOPLASMA AG SPEC QL: ABNORMAL
NRBC BLD AUTO-RTO: 0 /100 WBCS — SIGNIFICANT CHANGE UP (ref 0–0)
PHOSPHATE SERPL-MCNC: 5.8 MG/DL — HIGH (ref 2.5–4.5)
PLATELET # BLD AUTO: 237 K/UL — SIGNIFICANT CHANGE UP (ref 150–400)
POTASSIUM SERPL-MCNC: 4.3 MMOL/L — SIGNIFICANT CHANGE UP (ref 3.5–5.3)
POTASSIUM SERPL-SCNC: 4.3 MMOL/L — SIGNIFICANT CHANGE UP (ref 3.5–5.3)
RBC # BLD: 3.5 M/UL — LOW (ref 3.8–5.2)
RBC # FLD: 12.8 % — SIGNIFICANT CHANGE UP (ref 10.3–14.5)
SODIUM SERPL-SCNC: 134 MMOL/L — LOW (ref 135–145)
WBC # BLD: 4.77 K/UL — SIGNIFICANT CHANGE UP (ref 3.8–10.5)
WBC # FLD AUTO: 4.77 K/UL — SIGNIFICANT CHANGE UP (ref 3.8–10.5)

## 2025-03-26 PROCEDURE — G0545: CPT

## 2025-03-26 PROCEDURE — 99232 SBSQ HOSP IP/OBS MODERATE 35: CPT

## 2025-03-26 PROCEDURE — 93325 DOPPLER ECHO COLOR FLOW MAPG: CPT | Mod: 26

## 2025-03-26 PROCEDURE — 93312 ECHO TRANSESOPHAGEAL: CPT | Mod: 26

## 2025-03-26 PROCEDURE — 99232 SBSQ HOSP IP/OBS MODERATE 35: CPT | Mod: GC

## 2025-03-26 PROCEDURE — 93320 DOPPLER ECHO COMPLETE: CPT | Mod: 26

## 2025-03-26 PROCEDURE — 99223 1ST HOSP IP/OBS HIGH 75: CPT

## 2025-03-26 RX ORDER — CEFPODOXIME PROXETIL 200 MG/1
200 TABLET, FILM COATED ORAL EVERY 12 HOURS
Refills: 0 | Status: DISCONTINUED | OUTPATIENT
Start: 2025-03-26 | End: 2025-03-26

## 2025-03-26 RX ORDER — CEFPODOXIME PROXETIL 200 MG/1
200 TABLET, FILM COATED ORAL EVERY 24 HOURS
Refills: 0 | Status: DISCONTINUED | OUTPATIENT
Start: 2025-03-26 | End: 2025-03-27

## 2025-03-26 RX ORDER — MUPIROCIN CALCIUM 20 MG/G
1 CREAM TOPICAL EVERY 12 HOURS
Refills: 0 | Status: DISCONTINUED | OUTPATIENT
Start: 2025-03-26 | End: 2025-03-26

## 2025-03-26 RX ORDER — MUPIROCIN CALCIUM 20 MG/G
1 CREAM TOPICAL
Refills: 0 | Status: DISCONTINUED | OUTPATIENT
Start: 2025-03-26 | End: 2025-03-27

## 2025-03-26 RX ORDER — INSULIN GLARGINE-YFGN 100 [IU]/ML
7 INJECTION, SOLUTION SUBCUTANEOUS AT BEDTIME
Refills: 0 | Status: DISCONTINUED | OUTPATIENT
Start: 2025-03-26 | End: 2025-03-27

## 2025-03-26 RX ADMIN — PREDNISONE 10 MILLIGRAM(S): 20 TABLET ORAL at 06:11

## 2025-03-26 RX ADMIN — CEFPODOXIME PROXETIL 200 MILLIGRAM(S): 200 TABLET, FILM COATED ORAL at 21:43

## 2025-03-26 RX ADMIN — Medication 3 MILLIGRAM(S): at 23:40

## 2025-03-26 RX ADMIN — Medication 20 MILLIGRAM(S): at 12:13

## 2025-03-26 RX ADMIN — SEVELAMER HYDROCHLORIDE 1600 MILLIGRAM(S): 800 TABLET ORAL at 21:35

## 2025-03-26 RX ADMIN — GABAPENTIN 300 MILLIGRAM(S): 400 CAPSULE ORAL at 06:11

## 2025-03-26 RX ADMIN — INSULIN LISPRO 8: 100 INJECTION, SOLUTION INTRAVENOUS; SUBCUTANEOUS at 11:33

## 2025-03-26 RX ADMIN — METOPROLOL SUCCINATE 25 MILLIGRAM(S): 50 TABLET, EXTENDED RELEASE ORAL at 06:11

## 2025-03-26 RX ADMIN — Medication 90 MILLIGRAM(S): at 06:53

## 2025-03-26 RX ADMIN — HEPARIN SODIUM 5000 UNIT(S): 1000 INJECTION INTRAVENOUS; SUBCUTANEOUS at 06:12

## 2025-03-26 RX ADMIN — INSULIN LISPRO 4: 100 INJECTION, SOLUTION INTRAVENOUS; SUBCUTANEOUS at 21:34

## 2025-03-26 RX ADMIN — SEVELAMER HYDROCHLORIDE 1600 MILLIGRAM(S): 800 TABLET ORAL at 06:11

## 2025-03-26 RX ADMIN — ROSUVASTATIN CALCIUM 20 MILLIGRAM(S): 5 TABLET, FILM COATED ORAL at 21:35

## 2025-03-26 RX ADMIN — INSULIN GLARGINE-YFGN 7 UNIT(S): 100 INJECTION, SOLUTION SUBCUTANEOUS at 21:32

## 2025-03-26 NOTE — DIETITIAN INITIAL EVALUATION ADULT - PROBLEM SELECTOR PLAN 2
Lateral T wave inversions new since prior  Prolonged QTc  Admit to telemetry for monitoring  Will obtain TTE and cardio consult now, and she needs cardio evaluation pretransplant anyway  Cardio Dr. Chapa consulted

## 2025-03-26 NOTE — DIETITIAN INITIAL EVALUATION ADULT - PROBLEM SELECTOR PROBLEM 1
Pneumonia Chonodrocutaneous Helical Advancement Flap Text: The defect edges were debeveled with a #15 scalpel blade.  Given the location of the defect and the proximity to free margins a chondrocutaneous helical advancement flap was deemed most appropriate.  Using a sterile surgical marker, the appropriate advancement flap was drawn incorporating the defect and placing the expected incisions within the relaxed skin tension lines where possible.    The area thus outlined was incised deep to adipose tissue with a #15 scalpel blade.  The skin margins were undermined to an appropriate distance in all directions utilizing iris scissors.
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
## 2025-03-26 NOTE — PROGRESS NOTE ADULT - PROBLEM SELECTOR PLAN 4
hx of ESRD on HD MWF   s/p right permacath in 09/2024  last HD Friday 3/21  on renal transplant list  follows with Dr. Duke  s/p HD 3/24, now off O2, sat well on RA   - renal diet, fluid restriction 1.2 L  - c/w sevelamer  - c/w metoprolol and nifedipine for BP control  - c/w PO lasix on nondialysis days  - nephro Dr. Romero following

## 2025-03-26 NOTE — PROGRESS NOTE ADULT - PROBLEM SELECTOR PLAN 8
hx of HLD on Crestor 20 qd   - c/w home med  - TLC/DASH diet

## 2025-03-26 NOTE — CONSULT NOTE ADULT - SUBJECTIVE AND OBJECTIVE BOX
HPI:  54-year-old woman from home, history of sarcoidosis on prednisone, diabetes off insulin, hypertension, hyperlipidemia, ESRD on dialysis Monday Wednesday Friday with right permacath placed in September, last dialysis on Friday, coming for worsening shortness of breath since day prior to admission. No recent hospitalization, no recent antibiotics.  No known sick contact.  She had pneumococcal vaccine. On the list for kidney transplant.  Chest x-ray with R perihilar and middle lobe multifocal airspace opacities/infiltrates. Patient was Dx with pneumonia and has been treated with CTX and azithro. ID consult requested b/o LINDA finding of small, linear 0.2 x 0.3 cm echodensity on the ventricular side of the anterior mitral valve leaflet with concern for endocarditis.     ED Course    Vitals: BPmax 163/85  Labs: glu 214; bnp 75k; BUN/Cr 38/7.05  Intervention: ceftriaxone, azithro, tylenol  Consults: nephro Njeru, Cardio Plitt  Images: CXR RIGHT perihilar and RIGHT middle lobe lung multifocal airspace ill-defined opacities/infiltrates.   (23 Mar 2025 17:44)      PAST MEDICAL & SURGICAL HISTORY:  Sarcoidosis      Diabetes      Hypertension      Hyperlipidemia      Chronic kidney disease (CKD), stage III (moderate)      Seizure disorder      No significant past surgical history          MEDS:  acetaminophen     Tablet .. 650 milliGRAM(s) Oral every 6 hours PRN  aluminum hydroxide/magnesium hydroxide/simethicone Suspension 30 milliLiter(s) Oral every 4 hours PRN  cefTRIAXone   IVPB 1000 milliGRAM(s) IV Intermittent every 24 hours  chlorhexidine 2% Cloths 1 Application(s) Topical <User Schedule>  famotidine    Tablet 20 milliGRAM(s) Oral daily  ferrous    sulfate 325 milliGRAM(s) Oral daily  furosemide    Tablet 80 milliGRAM(s) Oral <User Schedule>  gabapentin 300 milliGRAM(s) Oral two times a day  guaiFENesin Oral Liquid (Sugar-Free) 100 milliGRAM(s) Oral every 6 hours PRN  heparin   Injectable 5000 Unit(s) SubCutaneous every 12 hours  insulin glargine Injectable (LANTUS) 7 Unit(s) SubCutaneous at bedtime  insulin lispro (ADMELOG) corrective regimen sliding scale   SubCutaneous three times a day before meals  insulin lispro (ADMELOG) corrective regimen sliding scale   SubCutaneous at bedtime  melatonin 3 milliGRAM(s) Oral at bedtime PRN  metoprolol succinate ER 25 milliGRAM(s) Oral daily  mupirocin 2% Ointment 1 Application(s) Both Nostrils two times a day  NIFEdipine XL 90 milliGRAM(s) Oral daily  ondansetron Injectable 4 milliGRAM(s) IV Push every 8 hours PRN  predniSONE   Tablet 10 milliGRAM(s) Oral daily  rosuvastatin 20 milliGRAM(s) Oral at bedtime  sevelamer carbonate 1600 milliGRAM(s) Oral three times a day      ALLERGIES  No Known Allergies      SOCIAL HISTORY:    FAMILY HISTORY:  Family history of diabetes mellitus in first degree relative        ROS:    General:	    Skin:  	  HEENT:    Respiratory and Thorax:  	  Cardiovascular:	    Abdomen:	    Genitourinary:	    Musculoskeletal:	    Neurological:	    Psychiatric:	    Hematology/Lymphatics:	    Endocrine:	    PHYSICAL EXAM:    Vital Signs Last 24 Hrs  T(C): 36.2 (26 Mar 2025 11:36), Max: 36.8 (25 Mar 2025 23:53)  T(F): 97.2 (26 Mar 2025 11:36), Max: 98.2 (25 Mar 2025 23:53)  HR: 82 (26 Mar 2025 11:36) (81 - 93)  BP: 126/76 (26 Mar 2025 11:36) (126/76 - 167/84)  BP(mean): 92 (26 Mar 2025 11:36) (92 - 100)  RR: 18 (26 Mar 2025 11:36) (18 - 19)  SpO2: 95% (26 Mar 2025 11:36) (92% - 100%)    Parameters below as of 26 Mar 2025 11:36  Patient On (Oxygen Delivery Method): room air          Gen:    HEENT:    Neck:    Lymph Nodes:    Breasts:    Back:    Chest/Thorax:    Cardiovascular:    Gastrointestinal:    Genitourinary:    Rectal:    Extremities:    Vascular:    Neurological:    Skin:    Psychiatric:    LABS/DIAGNOSTIC TESTS:                          10.1   4.77  )-----------( 237      ( 26 Mar 2025 07:10 )             30.1     WBC Count: 4.77 K/uL (03-26 @ 07:10)  WBC Count: 6.25 K/uL (03-25 @ 06:15)  WBC Count: 7.38 K/uL (03-24 @ 05:20)  WBC Count: 9.19 K/uL (03-23 @ 15:50)      03-26    134[L]  |  98  |  70[H]  ----------------------------<  263[H]  4.3   |  26  |  8.50[H]    Ca    8.7      26 Mar 2025 07:10  Phos  5.8     03-26  Mg     2.8     03-26    TPro  7.1  /  Alb  3.2[L]  /  TBili  0.4  /  DBili  x   /  AST  15  /  ALT  20  /  AlkPhos  80  03-25      LIVER FUNCTIONS - ( 25 Mar 2025 06:15 )  Alb: 3.2 g/dL / Pro: 7.1 g/dL / ALK PHOS: 80 U/L / ALT: 20 U/L DA / AST: 15 U/L / GGT: x             PT/INR - ( 25 Mar 2025 06:15 )   PT: 10.2 sec;   INR: 0.88 ratio         PTT - ( 25 Mar 2025 06:15 )  PTT:29.4 sec    Legionella pneumophila Antigen, Urine (03.24.25 @ 05:47)   Legionella Antigen, Urine: Negative:      CULTURES:     Culture - Blood (collected 03-23-25 @ 17:50)  Source: Blood Blood  Preliminary Report (03-26-25 @ 01:01):    No growth at 48 Hours    Culture - Blood (collected 03-23-25 @ 17:45)  Source: Blood Blood  Preliminary Report (03-26-25 @ 01:01):    No growth at 48 Hours        RADIOLOGY  < from: Xray Chest 2 Views PA/Lat (03.23.25 @ 16:13) >  ACC: [id number] EXAM:  XR CHEST PA LAT 2V   ORDERED BY: AYO MUÑOZ     PROCEDURE DATE:  03/23/2025          INTERPRETATION:  PA and lateral chest radiographs    COMPARISON: 10/30/2024 chest x-ray.    CLINICAL INFORMATION: Chest Pain.    FINDINGS:  CATHETERS AND TUBES: Tunneled double lumen catheter tip within SVC.    PULMONARY: RIGHT perihilar and RIGHT middle lobe lung multifocal airspace   ill-defined opacities/infiltrates. LEFT lung parenchyma clear...  No pleural effusion or pneumothorax.    HEART/VASCULAR: The heart size and mediastinum configuration are within   the limits of normal.    BONES: The visualized osseous thorax is intact.    IMPRESSION:  RIGHT perihilar and RIGHT middle lobe lung multifocal airspace   ill-defined opacities/infiltrates.    ___________________________________________________________________    < from: TTE W or WO Ultrasound Enhancing Agent (03.24.25 @ 16:00) >    TRANSTHORACIC ECHOCARDIOGRAM REPORT  ________________________________________________________________________________                                      _______       Pt. Name:       LEONEL BEVERLY Study Date:    3/24/2025  MRN:            [id number]         YOB: 1970  Accession #:    [id number]         Age:           54 years  Account#:       [id number]        Gender:        F  Heart Rate:                       Height:        59.84 in (152.00 cm)  Rhythm:     Weight:        111.00 lb (50.35 kg)  Blood Pressure: 166/85 mmHg       BSA/BMI:       1.45 m² / 21.79 kg/m²  ________________________________________________________________________________________  Referring Physician:    [id number] Pavel Narayanan  Interpreting Physician: Teresa Chapa MD  Primary Sonographer:    Rosa Brito [facility]    CPT:               ECHO TTE WO CON COMP W DOPP - 55898.m  Indication(s):     Dyspnea, unspecified - R06.00  Procedure:         Transthoracic echocardiogram with 2-D, M-mode and complete                     spectral and color flow Doppler.  Ordering Location: [facility]  Admission Status:  Inpatient  Study Information: Image quality for this study is fair.    _______________________________________________________________________________________     CONCLUSIONS:      1. Left ventricular cavity is normal in size. Left ventricular wall thickness is mildly increased. Left ventricular systolic function is normal with an ejection fraction visually estimated at 55 to 60 %. There are no regional wall motion abnormalities seen.   2. Basal septal hypertrophy (measures 1.2 cm) without evidence of left ventricular outflow tract obstruction.   3. There is moderate (grade 2) left ventricular diastolic dysfunction.   4. Normal right ventricular cavity size and normal right ventricular systolic function.   5. Trileaflet aortic valve. There is focal calcification of the aortic valve leaflets. No aortic valve stenosis.   6. Trace aortic regurgitation.   7. There is mild calcification of the mitral valve annulus.   8. Thickened anterior mitral valve leaflet, with oval shaped echodensity ~1.1cm x 0.51cm on the left ventricular aspect.   9. Trace mitral regurgitation.  10. Pulmonary artery systolic pressure could not be estimated.  11. Small right pleural effusion noted.  12. Trace pericardial effusion noted adjacent to the right atrium.  13. Findings were discussed with Dr Willis on 3/24/2025. No prior echocardiogram is available for comparison.    ______________________________________________________________________________________    < from: LINDA W or WO Ultrasound Enhancing Agent (03.26.25 @ 09:45) >  TRANSESOPHAGEAL ECHOCARDIOGRAM REPORT  ________________________________________________________________________________                                      _______       Pt. Name:       LEONEL BEVERLY Study Date:    3/26/2025  MRN:            [id number]         YOB: 1970  Accession #:    [id number]         Age:           54 years  Account#:       [id number]        Gender:        F  Heart Rate:     75 bpm            Height:        60.00 in (152.40 cm)  Rhythm:         sinus rhythm      Weight:        111.00 lb (50.35 kg)  Blood Pressure: 143/78 mmHg       BSA/BMI:       1.45 m² / 21.68 kg/m²  ________________________________________________________________________________________  Referring Physician:    [id number] Twin Trevizo  Interpreting Physician: Tra Barajas  Primary Sonographer:    LINA    CPT:               ECHO TRANSESOPH W/O CON - 44707.m;DOPPLER ECHO COMP W SPECT -                     34253.m;DOPPL ECHO COLOR FLOW - 51285.m  Indication(s):     Nonrheumatic mitral [valve] insufficiency - I34.0  Procedure:         Transesophageal echocardiogram performed with 2D, M-mode and                     complete spectral and color flow Doppler.  Ordering Location: [facility]  Admission Status:  Inpatient    _______________________________________________________________________________________     CONCLUSIONS:      1. Left ventricular systolic function is normal with an ejection fraction visually estimated at 55 to 60 %.   2. Mild mitral regurgitation.   3. There is moderate calcification of the mitral valve annulus.   4. There is a small, linear 0.2 x 0.3 cm echodensity on the ventricular side of the anterior mitral valve leaflet. Differential includes torn chordae vs. sequelae of prior vegetation, among others. Clinical correlation is advised.   5. Catheter noted in the right atrium.   6. Trace aortic regurgitation.   7. Trace pulmonic regurgitation.   8. Trace pericardial effusion.   9. Mild atheroma in the visualized portions of the transverse aortic arch. Mild atheroma in the visualized portions of the descending aorta.  10. Compared to the transthoracic echocardiogram performed on 3/24/2025, a LINDA was performed and additional information provided. Findings were discussed with Dr. Chapa on 3/26/2025 at 1045.      < end of copied text >                     HPI:  54-year-old woman from home, history of sarcoidosis on prednisone, diabetes off insulin, hypertension, hyperlipidemia, ESRD on dialysis Monday Wednesday Friday with right permacath placed in September, last dialysis on Friday, coming for worsening shortness of breath since day prior to admission. No recent hospitalization, no recent antibiotics.  No known sick contacts.  She had pneumococcal vaccine. On the list for kidney transplant.  Chest x-ray with R perihilar and middle lobe multifocal airspace opacities/infiltrates. Patient was Dx with pneumonia and has been treated with CTX and azithro. ID consult requested to evaluate pt b/o LINDA finding of small, linear 0.2 x 0.3 cm echodensity on the ventricular side of the anterior mitral valve leaflet with concern for endocarditis.     ED Course    Vitals: BPmax 163/85  Labs: glu 214; bnp 75k; BUN/Cr 38/7.05  Intervention: ceftriaxone, azithro, tylenol  Consults: nephro Njeru, Cardio Plitt  Images: CXR RIGHT perihilar and RIGHT middle lobe lung multifocal airspace ill-defined opacities/infiltrates.   (23 Mar 2025 17:44)    At the time of my consult, pt said her R upper chest permacath has been in place for about 1yr with no drainage, redness or tenderness. Pt said she had chills, palpitattions, SOB but no fever PTA. + c/o intermittent H/a and visual issues R eye. She says she is scheduled for intravitreal injections as an outpt for diabetic retinopathy. Urinates very little. No N, V, diarrhea or musculoskel complaints.       PAST MEDICAL & SURGICAL HISTORY:  Sarcoidosis      Diabetes      Hypertension      Hyperlipidemia      Chronic kidney disease (CKD), stage III (moderate)      Seizure disorder      No significant past surgical history          MEDS:  acetaminophen     Tablet .. 650 milliGRAM(s) Oral every 6 hours PRN  aluminum hydroxide/magnesium hydroxide/simethicone Suspension 30 milliLiter(s) Oral every 4 hours PRN  cefTRIAXone   IVPB 1000 milliGRAM(s) IV Intermittent every 24 hours  chlorhexidine 2% Cloths 1 Application(s) Topical <User Schedule>  famotidine    Tablet 20 milliGRAM(s) Oral daily  ferrous    sulfate 325 milliGRAM(s) Oral daily  furosemide    Tablet 80 milliGRAM(s) Oral <User Schedule>  gabapentin 300 milliGRAM(s) Oral two times a day  guaiFENesin Oral Liquid (Sugar-Free) 100 milliGRAM(s) Oral every 6 hours PRN  heparin   Injectable 5000 Unit(s) SubCutaneous every 12 hours  insulin glargine Injectable (LANTUS) 7 Unit(s) SubCutaneous at bedtime  insulin lispro (ADMELOG) corrective regimen sliding scale   SubCutaneous three times a day before meals  insulin lispro (ADMELOG) corrective regimen sliding scale   SubCutaneous at bedtime  melatonin 3 milliGRAM(s) Oral at bedtime PRN  metoprolol succinate ER 25 milliGRAM(s) Oral daily  mupirocin 2% Ointment 1 Application(s) Both Nostrils two times a day  NIFEdipine XL 90 milliGRAM(s) Oral daily  ondansetron Injectable 4 milliGRAM(s) IV Push every 8 hours PRN  predniSONE   Tablet 10 milliGRAM(s) Oral daily  rosuvastatin 20 milliGRAM(s) Oral at bedtime  sevelamer carbonate 1600 milliGRAM(s) Oral three times a day      ALLERGIES  No Known Allergies      SOCIAL HISTORY: emigrated from [place] many yrs ago; no cigs or ETOH; lives with 4 of her 6 children    FAMILY HISTORY:  Family history of diabetes mellitus in first degree relative        ROS:    General:	no fever or chills at this time    Skin: denies rashes  	  HEENT: + vision complaints R eye (see HPI)    Respiratory and Thorax: no further SOB, no CP  	  Cardiovascular:	no CP or palpitations    GI: no N, V, diarrhea	    Genitourinary:	urinates very little; says she's on a kidney Tx list    Musculoskeletal:	 no complaints of joint pain or swelling    Neurological:	offers no complaints        PHYSICAL EXAM:    Vital Signs Last 24 Hrs  T(C): 36.2 (26 Mar 2025 11:36), Max: 36.8 (25 Mar 2025 23:53)  T(F): 97.2 (26 Mar 2025 11:36), Max: 98.2 (25 Mar 2025 23:53)  HR: 82 (26 Mar 2025 11:36) (81 - 93)  BP: 126/76 (26 Mar 2025 11:36) (126/76 - 167/84)  BP(mean): 92 (26 Mar 2025 11:36) (92 - 100)  RR: 18 (26 Mar 2025 11:36) (18 - 19)  SpO2: 95% (26 Mar 2025 11:36) (92% - 100%)    Parameters below as of 26 Mar 2025 11:36  Patient On (Oxygen Delivery Method): room air          Gen: alert, responsive and in NAD    HEENT: NC/AT; conj. "muddy," mouth--upper dentures    Neck: supple    Lymph Nodes: no enlarged submand, cervical or supraclav LNs    Back: no vert or CVA tenderness    Chest/Thorax: clear to auscultation; + R upper chest permacath with no drainage or surrounding erythema    Cardiovascular: S1S2 reg. Cd NOT hear any murmurs, gallops, rugs    Gastrointestinal: soft and non-tender with active BS    Genitourinary: no catheter present    Extremities: no LE swelling; no splinter hemorrh, Janeway lesions, Osler's nodes    Neurological: A+Ox3; toes downgoing; no focal findings    Skin: no rashes or other lesions    Psychiatric: appropriate affect      LABS/DIAGNOSTIC TESTS:                          10.1   4.77  )-----------( 237      ( 26 Mar 2025 07:10 )             30.1     WBC Count: 4.77 K/uL (03-26 @ 07:10)  WBC Count: 6.25 K/uL (03-25 @ 06:15)  WBC Count: 7.38 K/uL (03-24 @ 05:20)  WBC Count: 9.19 K/uL (03-23 @ 15:50)      03-26    134[L]  |  98  |  70[H]  ----------------------------<  263[H]  4.3   |  26  |  8.50[H]    Ca    8.7      26 Mar 2025 07:10  Phos  5.8     03-26  Mg     2.8     03-26    TPro  7.1  /  Alb  3.2[L]  /  TBili  0.4  /  DBili  x   /  AST  15  /  ALT  20  /  AlkPhos  80  03-25      LIVER FUNCTIONS - ( 25 Mar 2025 06:15 )  Alb: 3.2 g/dL / Pro: 7.1 g/dL / ALK PHOS: 80 U/L / ALT: 20 U/L DA / AST: 15 U/L / GGT: x             PT/INR - ( 25 Mar 2025 06:15 )   PT: 10.2 sec;   INR: 0.88 ratio         PTT - ( 25 Mar 2025 06:15 )  PTT:29.4 sec    Legionella pneumophila Antigen, Urine (03.24.25 @ 05:47)   Legionella Antigen, Urine: Negative:      CULTURES:     Culture - Blood (collected 03-23-25 @ 17:50)  Source: Blood Blood  Preliminary Report (03-26-25 @ 01:01):    No growth at 48 Hours    Culture - Blood (collected 03-23-25 @ 17:45)  Source: Blood Blood  Preliminary Report (03-26-25 @ 01:01):    No growth at 48 Hours        RADIOLOGY  < from: Xray Chest 2 Views PA/Lat (03.23.25 @ 16:13) >  ACC: [id number] EXAM:  XR CHEST PA LAT 2V   ORDERED BY: AYO MUÑOZ     PROCEDURE DATE:  03/23/2025          INTERPRETATION:  PA and lateral chest radiographs    COMPARISON: 10/30/2024 chest x-ray.    CLINICAL INFORMATION: Chest Pain.    FINDINGS:  CATHETERS AND TUBES: Tunneled double lumen catheter tip within SVC.    PULMONARY: RIGHT perihilar and RIGHT middle lobe lung multifocal airspace   ill-defined opacities/infiltrates. LEFT lung parenchyma clear...  No pleural effusion or pneumothorax.    HEART/VASCULAR: The heart size and mediastinum configuration are within   the limits of normal.    BONES: The visualized osseous thorax is intact.    IMPRESSION:  RIGHT perihilar and RIGHT middle lobe lung multifocal airspace   ill-defined opacities/infiltrates.    ___________________________________________________________________    < from: TTE W or WO Ultrasound Enhancing Agent (03.24.25 @ 16:00) >    TRANSTHORACIC ECHOCARDIOGRAM REPORT  ________________________________________________________________________________                                      _______       Pt. Name:       LEONEL Escalera Date:    3/24/2025  MRN:            [id number]         YOB: 1970  Accession #:    [id number]         Age:           54 years  Account#:       [id number]        Gender:        F  Heart Rate:                       Height:        59.84 in (152.00 cm)  Rhythm:     Weight:        111.00 lb (50.35 kg)  Blood Pressure: 166/85 mmHg       BSA/BMI:       1.45 m² / 21.79 kg/m²  ________________________________________________________________________________________  Referring Physician:    [id number] Pavel Narayanan  Interpreting Physician: Teresa Chapa MD  Primary Sonographer:    Rosa Brito [facility]    CPT:               ECHO TTE WO CON COMP W DOPP - 47567.m  Indication(s):     Dyspnea, unspecified - R06.00  Procedure:         Transthoracic echocardiogram with 2-D, M-mode and complete                     spectral and color flow Doppler.  Ordering Location: [facility]  Admission Status:  Inpatient  Study Information: Image quality for this study is fair.    _______________________________________________________________________________________     CONCLUSIONS:      1. Left ventricular cavity is normal in size. Left ventricular wall thickness is mildly increased. Left ventricular systolic function is normal with an ejection fraction visually estimated at 55 to 60 %. There are no regional wall motion abnormalities seen.   2. Basal septal hypertrophy (measures 1.2 cm) without evidence of left ventricular outflow tract obstruction.   3. There is moderate (grade 2) left ventricular diastolic dysfunction.   4. Normal right ventricular cavity size and normal right ventricular systolic function.   5. Trileaflet aortic valve. There is focal calcification of the aortic valve leaflets. No aortic valve stenosis.   6. Trace aortic regurgitation.   7. There is mild calcification of the mitral valve annulus.   8. Thickened anterior mitral valve leaflet, with oval shaped echodensity ~1.1cm x 0.51cm on the left ventricular aspect.   9. Trace mitral regurgitation.  10. Pulmonary artery systolic pressure could not be estimated.  11. Small right pleural effusion noted.  12. Trace pericardial effusion noted adjacent to the right atrium.  13. Findings were discussed with Dr Willis on 3/24/2025. No prior echocardiogram is available for comparison.    ______________________________________________________________________________________    < from: LINDA W or WO Ultrasound Enhancing Agent (03.26.25 @ 09:45) >  TRANSESOPHAGEAL ECHOCARDIOGRAM REPORT  ________________________________________________________________________________                                      _______       Pt. Name:       LEONEL BEVERLY Study Date:    3/26/2025  MRN:            [id number]         YOB: 1970  Accession #:    [id number]         Age:           54 years  Account#:       [id number]        Gender:        F  Heart Rate:     75 bpm            Height:        60.00 in (152.40 cm)  Rhythm:         sinus rhythm      Weight:        111.00 lb (50.35 kg)  Blood Pressure: 143/78 mmHg       BSA/BMI:       1.45 m² / 21.68 kg/m²  ________________________________________________________________________________________  Referring Physician:    [id number] Twin Trevizo  Interpreting Physician: Tra Barajas  Primary Sonographer:    LINA    CPT:               ECHO TRANSESOPH W/O CON - 09954.m;DOPPLER ECHO COMP W SPECT -                     09289.m;DOPPL ECHO COLOR FLOW - 12102.m  Indication(s):     Nonrheumatic mitral [valve] insufficiency - I34.0  Procedure:         Transesophageal echocardiogram performed with 2D, M-mode and                     complete spectral and color flow Doppler.  Ordering Location: [facility]  Admission Status:  Inpatient    _______________________________________________________________________________________     CONCLUSIONS:      1. Left ventricular systolic function is normal with an ejection fraction visually estimated at 55 to 60 %.   2. Mild mitral regurgitation.   3. There is moderate calcification of the mitral valve annulus.   4. There is a small, linear 0.2 x 0.3 cm echodensity on the ventricular side of the anterior mitral valve leaflet. Differential includes torn chordae vs. sequelae of prior vegetation, among others. Clinical correlation is advised.   5. Catheter noted in the right atrium.   6. Trace aortic regurgitation.   7. Trace pulmonic regurgitation.   8. Trace pericardial effusion.   9. Mild atheroma in the visualized portions of the transverse aortic arch. Mild atheroma in the visualized portions of the descending aorta.  10. Compared to the transthoracic echocardiogram performed on 3/24/2025, a LINDA was performed and additional information provided. Findings were discussed with Dr. Chapa on 3/26/2025 at 1045.      < end of copied text >

## 2025-03-26 NOTE — PROGRESS NOTE ADULT - SUBJECTIVE AND OBJECTIVE BOX
PGY-1 Progress Note discussed with attending    PAGER #: [[phone]] TILL 5:00 PM  PLEASE CONTACT ON CALL TEAM:  - On Call Team (Please refer to Aline) FROM 5:00 PM - 8:30PM  - Nightfloat Team FROM 8:30 -7:30 AM    INTERVAL HPI/OVERNIGHT EVENTS:   -     REVIEW OF SYSTEMS:  CONSTITUTIONAL: No fever, weight loss, or fatigue  RESPIRATORY: No cough, wheezing, chills or hemoptysis; No shortness of breath  CARDIOVASCULAR: No chest pain, palpitations, dizziness, or leg swelling  GASTROINTESTINAL: No abdominal pain. No nausea, vomiting, or hematemesis; No diarrhea or constipation. No melena or hematochezia.  GENITOURINARY: No dysuria or hematuria, urinary frequency  NEUROLOGICAL: No headaches, memory loss, loss of strength, numbness, or tremors  SKIN: No itching, burning, rashes, or lesions     MEDICATIONS  (STANDING):  cefTRIAXone   IVPB 1000 milliGRAM(s) IV Intermittent every 24 hours  chlorhexidine 2% Cloths 1 Application(s) Topical <User Schedule>  famotidine    Tablet 20 milliGRAM(s) Oral daily  ferrous    sulfate 325 milliGRAM(s) Oral daily  furosemide    Tablet 80 milliGRAM(s) Oral <User Schedule>  gabapentin 300 milliGRAM(s) Oral two times a day  heparin   Injectable 5000 Unit(s) SubCutaneous every 12 hours  insulin glargine Injectable (LANTUS) 7 Unit(s) SubCutaneous at bedtime  insulin lispro (ADMELOG) corrective regimen sliding scale   SubCutaneous three times a day before meals  insulin lispro (ADMELOG) corrective regimen sliding scale   SubCutaneous at bedtime  metoprolol succinate ER 25 milliGRAM(s) Oral daily  mupirocin 2% Ointment 1 Application(s) Both Nostrils two times a day  NIFEdipine XL 90 milliGRAM(s) Oral daily  predniSONE   Tablet 10 milliGRAM(s) Oral daily  rosuvastatin 20 milliGRAM(s) Oral at bedtime  sevelamer carbonate 1600 milliGRAM(s) Oral three times a day    MEDICATIONS  (PRN):  acetaminophen     Tablet .. 650 milliGRAM(s) Oral every 6 hours PRN Mild Pain (1 - 3)  aluminum hydroxide/magnesium hydroxide/simethicone Suspension 30 milliLiter(s) Oral every 4 hours PRN Dyspepsia  guaiFENesin Oral Liquid (Sugar-Free) 100 milliGRAM(s) Oral every 6 hours PRN Cough  melatonin 3 milliGRAM(s) Oral at bedtime PRN Insomnia  ondansetron Injectable 4 milliGRAM(s) IV Push every 8 hours PRN Nausea and/or Vomiting      Vital Signs Last 24 Hrs  T(C): 36.5 (26 Mar 2025 07:36), Max: 36.9 (25 Mar 2025 11:02)  T(F): 97.7 (26 Mar 2025 07:36), Max: 98.4 (25 Mar 2025 11:02)  HR: 81 (26 Mar 2025 07:36) (81 - 93)  BP: 143/80 (26 Mar 2025 07:36) (143/80 - 167/84)  BP(mean): 100 (26 Mar 2025 07:36) (94 - 100)  RR: 18 (26 Mar 2025 07:36) (18 - 19)  SpO2: 97% (26 Mar 2025 07:36) (92% - 100%)    Parameters below as of 26 Mar 2025 07:36  Patient On (Oxygen Delivery Method): room air        PHYSICAL EXAMINATION:  GENERAL: NAD, well-groomed, well-developed, lying in bed comfortably  HEAD:  Atraumatic, normocephalic  EYES: EOMI, PERRLA, conjunctiva and sclera clear  ENT: Moist mucous membranes, good dentition, no lesions  NECK: Supple, normal appearance, no JVD, normal thyroid, trachea midline  CHEST/LUNG: Clear to auscultation bilaterally; no rales, rhonchi, wheezing, or rubs; no respiratory distress, no use of accessory muscles  HEART: Regular rate and rhythm; no murmurs, rubs, or gallops  ABDOMEN: Soft, nontender, nondistended; no rebound, no guarding, no palpable masses; bowel sounds present  GENITOURINARY: No suprapubic tenderness, no CVA tenderness  EXTREMITIES/MSK: 2+ peripheral pulses, brisk capillary refill; no clubbing, cyanosis, or edema  NERVOUS SYSTEM:  Alert & Oriented X3, speech clear, no deficits; all 4 extremities, full and equal 5/5 strength; DTRs 2+ intact and symmetric   LYMPH: No lymphadenopathy noted  SKIN: Warm, dry, no rashes or lesions                            10.1   4.77  )-----------( 237      ( 26 Mar 2025 07:10 )             30.1     03-26    134[L]  |  98  |  70[H]  ----------------------------<  263[H]  4.3   |  26  |  8.50[H]    Ca    8.7      26 Mar 2025 07:10  Phos  5.8     03-26  Mg     2.8     03-26    TPro  7.1  /  Alb  3.2[L]  /  TBili  0.4  /  DBili  x   /  AST  15  /  ALT  20  /  AlkPhos  80  03-25    LIVER FUNCTIONS - ( 25 Mar 2025 06:15 )  Alb: 3.2 g/dL / Pro: 7.1 g/dL / ALK PHOS: 80 U/L / ALT: 20 U/L DA / AST: 15 U/L / GGT: x               PT/INR - ( 25 Mar 2025 06:15 )   PT: 10.2 sec;   INR: 0.88 ratio         PTT - ( 25 Mar 2025 06:15 )  PTT:29.4 sec  COVID-19 PCR: NotDetec (25 Mar 2025 16:10)      CAPILLARY BLOOD GLUCOSE      POCT Blood Glucose.: 274 mg/dL (26 Mar 2025 07:55)  POCT Blood Glucose.: 337 mg/dL (26 Mar 2025 00:48)  POCT Blood Glucose.: 210 mg/dL (25 Mar 2025 21:11)  POCT Blood Glucose.: 231 mg/dL (25 Mar 2025 16:51)  POCT Blood Glucose.: 429 mg/dL (25 Mar 2025 11:40)      RADIOLOGY & ADDITIONAL TESTS:       PGY-1 Progress Note discussed with attending    PAGER #: [[phone]] TILL 5:00 PM  PLEASE CONTACT ON CALL TEAM:  - On Call Team (Please refer to Aline) FROM 5:00 PM - 8:30PM  - Nightfloat Team FROM 8:30 -7:30 AM    INTERVAL HPI/OVERNIGHT EVENTS:   - No acute events overnight. Patient kept NPO for LINDA. Patient examined at bedside this AM.  Patient denies acute complaints. Patient is frustrated as she is in isolation due to COVID exposure.     REVIEW OF SYSTEMS:  CONSTITUTIONAL: No fever, weight loss, or fatigue  RESPIRATORY: No cough, chills or; No shortness of breath  CARDIOVASCULAR: No chest pain, palpitations, dizziness, or leg swelling  GASTROINTESTINAL: No abdominal pain. No nausea, vomiting; No diarrhea or constipation.   GENITOURINARY: No dysuria or hematuria, urinary frequency  NEUROLOGICAL: No headaches, loss of strength, numbness, or tremors  SKIN: No itching, burning, rashes, or lesions      MEDICATIONS  (STANDING):  cefTRIAXone   IVPB 1000 milliGRAM(s) IV Intermittent every 24 hours  chlorhexidine 2% Cloths 1 Application(s) Topical <User Schedule>  famotidine    Tablet 20 milliGRAM(s) Oral daily  ferrous    sulfate 325 milliGRAM(s) Oral daily  furosemide    Tablet 80 milliGRAM(s) Oral <User Schedule>  gabapentin 300 milliGRAM(s) Oral two times a day  heparin   Injectable 5000 Unit(s) SubCutaneous every 12 hours  insulin glargine Injectable (LANTUS) 7 Unit(s) SubCutaneous at bedtime  insulin lispro (ADMELOG) corrective regimen sliding scale   SubCutaneous three times a day before meals  insulin lispro (ADMELOG) corrective regimen sliding scale   SubCutaneous at bedtime  metoprolol succinate ER 25 milliGRAM(s) Oral daily  mupirocin 2% Ointment 1 Application(s) Both Nostrils two times a day  NIFEdipine XL 90 milliGRAM(s) Oral daily  predniSONE   Tablet 10 milliGRAM(s) Oral daily  rosuvastatin 20 milliGRAM(s) Oral at bedtime  sevelamer carbonate 1600 milliGRAM(s) Oral three times a day    MEDICATIONS  (PRN):  acetaminophen     Tablet .. 650 milliGRAM(s) Oral every 6 hours PRN Mild Pain (1 - 3)  aluminum hydroxide/magnesium hydroxide/simethicone Suspension 30 milliLiter(s) Oral every 4 hours PRN Dyspepsia  guaiFENesin Oral Liquid (Sugar-Free) 100 milliGRAM(s) Oral every 6 hours PRN Cough  melatonin 3 milliGRAM(s) Oral at bedtime PRN Insomnia  ondansetron Injectable 4 milliGRAM(s) IV Push every 8 hours PRN Nausea and/or Vomiting      Vital Signs Last 24 Hrs  T(C): 36.5 (26 Mar 2025 07:36), Max: 36.9 (25 Mar 2025 11:02)  T(F): 97.7 (26 Mar 2025 07:36), Max: 98.4 (25 Mar 2025 11:02)  HR: 81 (26 Mar 2025 07:36) (81 - 93)  BP: 143/80 (26 Mar 2025 07:36) (143/80 - 167/84)  BP(mean): 100 (26 Mar 2025 07:36) (94 - 100)  RR: 18 (26 Mar 2025 07:36) (18 - 19)  SpO2: 97% (26 Mar 2025 07:36) (92% - 100%)    Parameters below as of 26 Mar 2025 07:36  Patient On (Oxygen Delivery Method): room air      PHYSICAL EXAMINATION:  GENERAL: NAD, well-groomed, well-developed  HEAD:  Atraumatic, normocephalic  EYES: Conjunctiva and sclera clear  ENT: Moist mucous membranes  NECK: Supple, normal appearance  CHEST/LUNG: B/L crackles, no respiratory distress  HEART: Regular rate and rhythm; no murmurs, rubs, or gallops  ABDOMEN: Soft, nontender, nondistended; bowel sounds present  GENITOURINARY: No suprapubic tenderness  EXTREMITIES/MSK: 2+ peripheral pulses, no LE edema   NERVOUS SYSTEM:  Alert & Oriented X3, speech clear, no deficits  SKIN: Warm, dry, no rashes or lesions                          10.1   4.77  )-----------( 237      ( 26 Mar 2025 07:10 )             30.1     03-26    134[L]  |  98  |  70[H]  ----------------------------<  263[H]  4.3   |  26  |  8.50[H]    Ca    8.7      26 Mar 2025 07:10  Phos  5.8     03-26  Mg     2.8     03-26    TPro  7.1  /  Alb  3.2[L]  /  TBili  0.4  /  DBili  x   /  AST  15  /  ALT  20  /  AlkPhos  80  03-25    LIVER FUNCTIONS - ( 25 Mar 2025 06:15 )  Alb: 3.2 g/dL / Pro: 7.1 g/dL / ALK PHOS: 80 U/L / ALT: 20 U/L DA / AST: 15 U/L / GGT: x             PT/INR - ( 25 Mar 2025 06:15 )   PT: 10.2 sec;   INR: 0.88 ratio    PTT - ( 25 Mar 2025 06:15 )  PTT:29.4 sec  COVID-19 PCR: NotDetec (25 Mar 2025 16:10)      CAPILLARY BLOOD GLUCOSE  POCT Blood Glucose.: 274 mg/dL (26 Mar 2025 07:55)  POCT Blood Glucose.: 337 mg/dL (26 Mar 2025 00:48)  POCT Blood Glucose.: 210 mg/dL (25 Mar 2025 21:11)  POCT Blood Glucose.: 231 mg/dL (25 Mar 2025 16:51)  POCT Blood Glucose.: 429 mg/dL (25 Mar 2025 11:40)

## 2025-03-26 NOTE — PROGRESS NOTE ADULT - SUBJECTIVE AND OBJECTIVE BOX
NEPHROLOGY MEDICAL CARE, [facility] - Dr. Giorgi Romero/ Dr. Delroy Gutierrez/ Dr. Shahram Monaco/ Dr. Alfonso Rosario    Date of Service: 03-26-25    Patient was seen and examined at bedside.    CC: patient is okay while during HD    Vital Signs Last 24 Hrs  T(C): 36.4 (26 Mar 2025 15:43), Max: 36.8 (25 Mar 2025 23:53)  T(F): 97.6 (26 Mar 2025 15:43), Max: 98.2 (25 Mar 2025 23:53)  HR: 86 (26 Mar 2025 15:43) (81 - 93)  BP: 117/65 (26 Mar 2025 15:43) (108/68 - 167/84)  BP(mean): 92 (26 Mar 2025 11:36) (92 - 100)  RR: 16 (26 Mar 2025 15:43) (16 - 18)  SpO2: 97% (26 Mar 2025 15:35) (92% - 100%)    Parameters below as of 26 Mar 2025 15:43  Patient On (Oxygen Delivery Method): room air        03-25 @ 07:01  -  03-26 @ 07:00  --------------------------------------------------------  IN: 100 mL / OUT: 0 mL / NET: 100 mL        PHYSICAL EXAM:  GENERAL: No acute respiratory distress.  HEAD:  Atraumatic, Normocephalic  EYES: conjunctiva and sclera clear  ENMT: Moist mucous membranes  NECK: Supple, No JVD  NERVOUS SYSTEM:  Awake, Alert & Oriented X3,   CHEST/LUNG: Clear to percussion bilaterally; No rales, rhonchi, wheezing  HEART: Regular rate and rhythm; No murmurs, rubs, or gallops  ABDOMEN: Soft, Non-tender, Nondistended; Bowel sounds present  EXTREMITIES:  No cyanosis, or edema  SKIN: No rashes or lesions  DIALYSIS ACCESS:  Right PERMACATH    MEDICATIONS:  MEDICATIONS  (STANDING):  cefpodoxime 200 milliGRAM(s) Oral every 24 hours  chlorhexidine 2% Cloths 1 Application(s) Topical <User Schedule>  famotidine    Tablet 20 milliGRAM(s) Oral daily  ferrous    sulfate 325 milliGRAM(s) Oral daily  furosemide    Tablet 80 milliGRAM(s) Oral <User Schedule>  gabapentin 300 milliGRAM(s) Oral two times a day  heparin   Injectable 5000 Unit(s) SubCutaneous every 12 hours  insulin glargine Injectable (LANTUS) 7 Unit(s) SubCutaneous at bedtime  insulin lispro (ADMELOG) corrective regimen sliding scale   SubCutaneous three times a day before meals  insulin lispro (ADMELOG) corrective regimen sliding scale   SubCutaneous at bedtime  metoprolol succinate ER 25 milliGRAM(s) Oral daily  mupirocin 2% Ointment 1 Application(s) Both Nostrils two times a day  NIFEdipine XL 90 milliGRAM(s) Oral daily  predniSONE   Tablet 10 milliGRAM(s) Oral daily  rosuvastatin 20 milliGRAM(s) Oral at bedtime  sevelamer carbonate 1600 milliGRAM(s) Oral three times a day    MEDICATIONS  (PRN):  acetaminophen     Tablet .. 650 milliGRAM(s) Oral every 6 hours PRN Mild Pain (1 - 3)  aluminum hydroxide/magnesium hydroxide/simethicone Suspension 30 milliLiter(s) Oral every 4 hours PRN Dyspepsia  guaiFENesin Oral Liquid (Sugar-Free) 100 milliGRAM(s) Oral every 6 hours PRN Cough  melatonin 3 milliGRAM(s) Oral at bedtime PRN Insomnia  ondansetron Injectable 4 milliGRAM(s) IV Push every 8 hours PRN Nausea and/or Vomiting          LABS:                        10.1   4.77  )-----------( 237      ( 26 Mar 2025 07:10 )             30.1     03-26    134[L]  |  98  |  70[H]  ----------------------------<  263[H]  4.3   |  26  |  8.50[H]    Ca    8.7      26 Mar 2025 07:10  Phos  5.8     03-26  Mg     2.8     03-26    TPro  7.1  /  Alb  3.2[L]  /  TBili  0.4  /  DBili  x   /  AST  15  /  ALT  20  /  AlkPhos  80  03-25    PT/INR - ( 25 Mar 2025 06:15 )   PT: 10.2 sec;   INR: 0.88 ratio         PTT - ( 25 Mar 2025 06:15 )  PTT:29.4 sec  Urinalysis Basic - ( 26 Mar 2025 07:10 )    Color: x / Appearance: x / SG: x / pH: x  Gluc: 263 mg/dL / Ketone: x  / Bili: x / Urobili: x   Blood: x / Protein: x / Nitrite: x   Leuk Esterase: x / RBC: x / WBC x   Sq Epi: x / Non Sq Epi: x / Bacteria: x      Magnesium: 2.8 mg/dL (03-26 @ 07:10)  Phosphorus: 5.8 mg/dL (03-26 @ 07:10)    Urine studies    PTH and Vit D:

## 2025-03-26 NOTE — PROGRESS NOTE ADULT - PROBLEM SELECTOR PLAN 1
nonseptic on admission  s/p ceftriaxone, azithromycin in the ER  CXR  with RIGHT perihilar and RIGHT middle lobe lung multifocal airspace ill-defined opacities/infiltrates  procalcitonin elevated 0.67  s/p azithro x 3 days (last day 3/25)  BCx neg NGTD @ 48 hrs   legionella, strep, mycoplasma neg  weaned patient off O2, sat 94 on RA   - c/w ceftriaxone x 5 days (last day 3/27)

## 2025-03-26 NOTE — PROGRESS NOTE ADULT - PROBLEM SELECTOR PROBLEM 2
Yes this dose is okay.  Can we please update her chart with that dose?   Abnormal EKG Abnormality of heart valve

## 2025-03-26 NOTE — PROGRESS NOTE ADULT - ATTENDING COMMENTS
Patient seen and examined.  Case discussed with house staff.  Agree with above as edited.   Patient is a 54yoF with history of sarcoidosis, on prednisone, DM, HTN, HLD, ESRD on HD [MW s/p R permacat 09/2024], p/w worsening SOB x 1 day. Found to have PNA.   PNA - on Ceftriaxone - can change to cefpodoxime  Echodensity - seen on TTE - Now s/p LINDA - reviewed and d/w Cardiology - small echodensity remains - case d/w ID - appreciate ID consult. Unlikely to be endocarditis but will plan on surveillance cultures post d/c per ID recs  DM - although A1C is 6.2 patient with episodes of hyperglycemia requiring correctional sliding scale. Will give lantus 7u tonights.  HTN - Just increased nifedipine. c/w toprol  Sarcoidosis - c/w prednisone  ESRD - HD per nephrology. c/w sevelamer  Team d/w family  Case d/w Case management, social work and nursing on IDRs

## 2025-03-26 NOTE — DIETITIAN INITIAL EVALUATION ADULT - NS FNS DIET ORDER
Diet, NPO after Midnight:      NPO Start Date: 25-Mar-2025,   NPO Start Time: 23:59  Except Medications (03-25-25 @ 09:11)

## 2025-03-26 NOTE — PROGRESS NOTE ADULT - ASSESSMENT
54F w sarcoid, DM, HLD, ESRD on HD, CAD (coronary calcium on CT chest) presents with 1 day of sob found to have PNA on CXR, cardiology c/s for abn ecg     #Abnormal ECG - ECG shows NSR with lateral TWI. Pt denies cp. Of note, prior ECGs reviewed and pt had TWI back in March 2024. Pt has active PNA on imaging and on exam has crackles. She feels improved since Abx have been started. Has no cp.   - TTE with EF 55-60%. No wall motion abnormalities. Thickened anterior mitral valve leaflet, with oval shaped echodensity ~1.1cm x 0.51cm on the left ventricular aspect.  -s/p LINDA today-There is a small, linear 0.2 x 0.3 cm echodensity on the ventricular side of the anterior mitral valve leaflet. Differential includes torn chordae vs. sequelae of prior vegetation, among others. Clinical correlation is advised.  -Recommend ID eval  - PNA mgmt per primary team   - pt needs outpt cardiology f/u upon d/c - may need cMRI given sarcoid hx if no contraindications. Please ensure pt has appt scheduled prior to d/c.     #CAD - has coronary calcium on CT chest.  -start statin

## 2025-03-26 NOTE — DIETITIAN INITIAL EVALUATION ADULT - NSFNSGIIOFT_GEN_A_CORE
EVO=406 lb   Wt data in EMR reviewed: a bit fluctuated, may due to fluid shift from HD and/or scale variance, currently 116.8 lb 3/26/25

## 2025-03-26 NOTE — PROGRESS NOTE ADULT - PROBLEM SELECTOR PLAN 2
lateral T wave inversions new since prior  prolonged QTc  TTE LVEF 55-60%, M2DD, thickened anterior MV leaflet, with oval shaped echodensity ~1.1cm x 0.51cm on the left ventricular aspect  LINDA 0.2 x 0.3 cm echodensity on ventricular side of anterior MV leaflet  differential includes torn chordae vs. sequelae of prior vegetation  no indication for empiric Abx for endocarditis, does not meet [facility]'s criteria  - will need repeat serial BCx q3-4 days for 2-3 weeks as per ID  - cards Dr. Chapa following  - ID Dr Virk consulted

## 2025-03-26 NOTE — PROGRESS NOTE ADULT - PROBLEM SELECTOR PLAN 3
hx of sarcoidosis on prednisone 10 mg daily  - c/w home med

## 2025-03-26 NOTE — DIETITIAN INITIAL EVALUATION ADULT - PERTINENT MEDS FT
MEDICATIONS  (STANDING):  cefTRIAXone   IVPB 1000 milliGRAM(s) IV Intermittent every 24 hours  famotidine    Tablet 20 milliGRAM(s) Oral daily  ferrous    sulfate 325 milliGRAM(s) Oral daily  furosemide    Tablet 80 milliGRAM(s) Oral <User Schedule>  gabapentin 300 milliGRAM(s) Oral two times a day  heparin   Injectable 5000 Unit(s) SubCutaneous every 12 hours  insulin lispro (ADMELOG) corrective regimen sliding scale   SubCutaneous three times a day before meals  insulin lispro (ADMELOG) corrective regimen sliding scale   SubCutaneous at bedtime  metoprolol succinate ER 25 milliGRAM(s) Oral daily  NIFEdipine XL 90 milliGRAM(s) Oral daily  predniSONE   Tablet 10 milliGRAM(s) Oral daily  rosuvastatin 20 milliGRAM(s) Oral at bedtime  sevelamer carbonate 1600 milliGRAM(s) Oral three times a day    MEDICATIONS  (PRN):  acetaminophen     Tablet .. 650 milliGRAM(s) Oral every 6 hours PRN Mild Pain (1 - 3)  aluminum hydroxide/magnesium hydroxide/simethicone Suspension 30 milliLiter(s) Oral every 4 hours PRN Dyspepsia  guaiFENesin Oral Liquid (Sugar-Free) 100 milliGRAM(s) Oral every 6 hours PRN Cough  melatonin 3 milliGRAM(s) Oral at bedtime PRN Insomnia  ondansetron Injectable 4 milliGRAM(s) IV Push every 8 hours PRN Nausea and/or Vomiting

## 2025-03-26 NOTE — PROGRESS NOTE ADULT - PROBLEM SELECTOR PLAN 6
hx of DM, stopped insulin because her primary care doctor said her A1c was in good range  not currently taking any medication  hba1c 6.9 in Nov 2024  hba1c 6.2 on this admission  - adjust ISS to moderate sliding scale (on steroids)  - monitor fingersticks   - diabetic diet

## 2025-03-26 NOTE — DIETITIAN INITIAL EVALUATION ADULT - FACTORS AFF FOOD INTAKE
Jew/ethnic/cultural/personal food preferences acute on chronic comorbidities/Evangelical/ethnic/cultural/personal food preferences

## 2025-03-26 NOTE — DIETITIAN INITIAL EVALUATION ADULT - DIET TYPE
advanced diet as medically feasible, add Nepro 1can daily when diet advanced ( 425 kcal, 19 g protein)

## 2025-03-26 NOTE — DIETITIAN INITIAL EVALUATION ADULT - PROBLEM SELECTOR PLAN 1
Nonseptic on admission  Received ceftriaxone, azithromycin in the ER  Chest x-ray with RIGHT perihilar and RIGHT middle lobe lung multifocal airspace ill-defined opacities/infiltrates    Continue with ceftriaxone, azithro (monitor qtc on tele)   Follow-up blood cultures 3/23  Follow-up procalcitonin  Follow-up urine Legionella, strep  supplemental o2 prn

## 2025-03-26 NOTE — PROGRESS NOTE ADULT - PROBLEM SELECTOR PLAN 5
hx of HTN on nifedipine 60 qd, metoprolol 25 qd  -160s  - c/w metoprolol 25 qd and nifedipine 90 qd   - TLC/DASH diet

## 2025-03-26 NOTE — PROGRESS NOTE ADULT - PROBLEM SELECTOR PLAN 7
hx of neuropathy on gabapentin 300 BID   - c/w home med

## 2025-03-26 NOTE — DIETITIAN INITIAL EVALUATION ADULT - NAME AND PHONE
RD business card/Food&Nutrition # given for contact as needed; Pt also to be followed by dietitian at out-pt dialysis center when medically ready to be discharged

## 2025-03-26 NOTE — DIETITIAN INITIAL EVALUATION ADULT - OTHER INFO
Pt lives home with family PTA, alert, oriented, well-communicated; Reported varied/low appetite for long time, but denied recent wt changes, denied GI distress, chewing or swallowing problem; Unknown food allergy, food choices obtained and forwarded to Dietary; ESRD on HD x 1y, followed by dietitian at out-pt dialysis center; s/p  consult; NPO today for procedure per team, not on any oral nutritional supplement before, willing to try in-house

## 2025-03-26 NOTE — CONSULT NOTE ADULT - ASSESSMENT
ASSESSMENT    54-year-old woman from home, history of sarcoidosis on prednisone, diabetes off insulin, hypertension, hyperlipidemia, ESRD on dialysis Monday Wednesday Friday with right permacath placed in September, last dialysis on Friday, coming for worsening shortness of breath since day prior to admission. No recent hospitalization, no recent antibiotics.  No known sick contact.  She had pneumococcal vaccine. On the list for kidney transplant.  Chest x-ray is positive for pneumonia. She will be admitted for treatment of pneumonia.     Interval history: about an hour after initial examination, notified by RN that she was saturating 88% on 6L NC. Transitioned to NRB with improvement ot 100%. She appears comfortable and is not in distress. US of the chest with bilateral anterior B lines. ICU consulted for acute hypoxic respiratory failure. Will give IV lasix 80mg and obtain ABG and reassess.       _________CNS___________   none    _________CVS___________  #hypertension  #abnormal EKG  #long QTc  c/w home metoprolol, nifedipine w holding parameters   EKG with new lateral T wave inversions -- monitor on tele   TTE ordered -- Dr. Chapa consulted     _________RESP__________  #CAP  #acute hypoxic respiratory failure   #sarcoidosis   Chest x-ray with RIGHT perihilar and RIGHT middle lobe lung multifocal airspace ill-defined opacitie  - c/w ceftriaxone, azithro (monitor qtc on tele)   - f/u urine legionella, strep  - f/u procalcitonin   - c/w nonrebreather -- wean to NC as able   - B lines seen on US -- will give 80mg IV lasix now and reassess  - f/u repeat ABG   - c/w home prednisone 10 qd   - f/u bcx 3/23     ___________GI____________  none    ________ RENAL__________  #ESRD on HD   - MWF, last was Friday 3/21  - R permacath access  - renal diet, fluid restriction   - c/w sevelamer  - lasix on non HD days     __________MSK___________  none    ___________ID____________  #CAP  as above     _________ENDO__________  #DM  - Has stopped using insulin because her primary care doctor said her A1c was in good rangeNot currently taking any medication  - A1c 6.9 in Nov 2024  - Follow-up A1c  - ISS    ______HEME/ONC_______  none     _________SKIN____________  none     ________PROPHY_______  #DVT  heparin     ______GOC/DISPO___________    tele
Patient is a 54y Female whom presented to the hospital last evening with SOB and cough. Has H/O sarcoidosis, ESRD on HD at [facility].  in ED last night noted to be hypoxic. CXR revealed RT middle lobe multi-focal infiltrates suggesting PNA . US reveal B lines, improved hypoxia with IV ABX and IV lasix.  renal consulted for ESRD and resumption of HD.  She is S/P HD this morning with ultrafiltration. SOB improved, but remains on oxygen via NC. AS per did not miss dialysis sessions    # ESRD. presented with SOB, hypoxia sec to PNA and fluid overload. S/P with HD today with UF. SOB somewhat improved. cont ABX  cont lasix on non-HD days  fluid restriction 1.2 L/day, renal diet.  re-evaluated volume status in AM   #PNA. cont IV ABX.  # HTN s/p UF. cont nifedipine and metoprolol- may need to incr dosage if BP not well-controlled  # CKDMBD. phos at goal cont sevelamer.    D/W Dr Romero who will follow pt from 03/25/25.
54-year-old woman with history of sarcoidosis on prednisone, diabetes off insulin, hypertension, hyperlipidemia, ESRD on dialysis thru R upper chest permacath adm for worsening shortness of breath since day prior to admission. No recent hospitalization, no recent antibiotics.  Denied fever but did have chills. Chest x-ray with R perihilar and middle lobe multifocal airspace opacities/infiltrates. Patient was Dx with pneumonia and has been treated with CTX and azithro. ID consult requested to evaluate pt b/o LINDA finding of small, linear 0.2 x 0.3 cm echodensity on the ventricular side of the anterior mitral valve leaflet with concern for endocarditis. BC's 3/23 on admission negative. [facility] at [address] [place] [address]. Advised pt to return to ER for any worsening of clinical status after discharge from the hospital.     #Echodensity on MV-- pt does not meet [facility]'s criteria for endocarditis but concerned about abn finding on MV. BCs prior to Ab Rx on this adm were negative. Pt has been on Abs for pneumonia since admission which wd likely suppress org growth in subsequent BCs. Wd, therefore:  --d/c ceftriaxone  --give pt cefpodoxime 200 mg p.o. qd (adjusted for renal dysfn) to complete 3 more days of Ab treatment for pneumonia  --obtain ESR  --obtain serial BCs after completion of Ab Rx--q3d for 2 wks at either [address] [place] [address] clinic or at HD center    #CAP-- see above    #DM-- with ESRD and retinopathy  --cont. HD as prescribed  --f/u with Ophthal after hospital discharge    #Sarcoidosis-- steroids as recommended by medical team

## 2025-03-26 NOTE — PROGRESS NOTE ADULT - SUBJECTIVE AND OBJECTIVE BOX
PRESENTING CC: SOB    OVERNIGHT EVENTS: Patient with cough today. s/p LINDA.  Patient denies sob, chest pain, palpitations    PMH:   PAST MEDICAL & SURGICAL HISTORY:  Sarcoidosis    Diabetes    Hypertension    Hyperlipidemia    Chronic kidney disease (CKD), stage III (moderate)    Seizure disorder    No significant past surgical history        Allergies    No Known Allergies    Intolerances        MEDICATIONS  (STANDING):  cefTRIAXone   IVPB 1000 milliGRAM(s) IV Intermittent every 24 hours  chlorhexidine 2% Cloths 1 Application(s) Topical <User Schedule>  famotidine    Tablet 20 milliGRAM(s) Oral daily  ferrous    sulfate 325 milliGRAM(s) Oral daily  furosemide    Tablet 80 milliGRAM(s) Oral <User Schedule>  gabapentin 300 milliGRAM(s) Oral two times a day  heparin   Injectable 5000 Unit(s) SubCutaneous every 12 hours  insulin glargine Injectable (LANTUS) 7 Unit(s) SubCutaneous at bedtime  insulin lispro (ADMELOG) corrective regimen sliding scale   SubCutaneous three times a day before meals  insulin lispro (ADMELOG) corrective regimen sliding scale   SubCutaneous at bedtime  metoprolol succinate ER 25 milliGRAM(s) Oral daily  mupirocin 2% Ointment 1 Application(s) Both Nostrils two times a day  NIFEdipine XL 90 milliGRAM(s) Oral daily  predniSONE   Tablet 10 milliGRAM(s) Oral daily  rosuvastatin 20 milliGRAM(s) Oral at bedtime  sevelamer carbonate 1600 milliGRAM(s) Oral three times a day    MEDICATIONS  (PRN):  acetaminophen     Tablet .. 650 milliGRAM(s) Oral every 6 hours PRN Mild Pain (1 - 3)  aluminum hydroxide/magnesium hydroxide/simethicone Suspension 30 milliLiter(s) Oral every 4 hours PRN Dyspepsia  guaiFENesin Oral Liquid (Sugar-Free) 100 milliGRAM(s) Oral every 6 hours PRN Cough  melatonin 3 milliGRAM(s) Oral at bedtime PRN Insomnia  ondansetron Injectable 4 milliGRAM(s) IV Push every 8 hours PRN Nausea and/or Vomiting      FAMILY HISTORY:  Family history of diabetes mellitus in first degree relative      Reviewed; no change from my prior note    SOCIAL HISTORY:  Reviewed, no change from my prior note    REVIEW OF SYSTEMS:  Constitutional: [ ] fever, [ ]weight loss,  [ ]fatigue  Eyes: [ ] visual changes  Respiratory: [ ]shortness of breath;  [X ] cough, [ ]wheezing, [ ]chills, [ ]hemoptysis  Cardiovascular: [ ] chest pain, [ ]palpitations, [ ]dizziness,  [ ]leg swelling [ ]syncope  Gastrointestinal: [ ] abdominal pain, [ ]nausea, [ ]vomiting,  [ ]diarrhea   Genitourinary: [ ] dysuria, [ ] hematuria  Neurologic: [ ] headaches [ ] tremors [ ] weakness [ ] lightheadedness  Skin: [ ] itching, [ ]burning, [ ] rashes  Endocrine: [ ] heat or cold intolerance  Musculoskeletal: [ ] joint pain or swelling; [ ] muscle, back, or extremity pain  Psychiatric: [ ] depression, [ ]anxiety, [ ]mood swings, or [ ]difficulty sleeping  Hematologic: [ ] easy bruising, [ ] bleeding gums    [x] All remaining systems negative except as per above.   [  ] Unable to obtain    Vital Signs Last 24 Hrs  T(C): 36.2 (26 Mar 2025 11:36), Max: 36.8 (25 Mar 2025 23:53)  T(F): 97.2 (26 Mar 2025 11:36), Max: 98.2 (25 Mar 2025 23:53)  HR: 82 (26 Mar 2025 11:36) (81 - 93)  BP: 126/76 (26 Mar 2025 11:36) (126/76 - 167/84)  BP(mean): 92 (26 Mar 2025 11:36) (92 - 100)  RR: 18 (26 Mar 2025 11:36) (18 - 19)  SpO2: 95% (26 Mar 2025 11:36) (92% - 100%)    Parameters below as of 26 Mar 2025 11:36  Patient On (Oxygen Delivery Method): room air      I&O's Summary    25 Mar 2025 07:01  -  26 Mar 2025 07:00  --------------------------------------------------------  IN: 100 mL / OUT: 0 mL / NET: 100 mL        PHYSICAL EXAM:  General: No acute distress  HEENT: EOMI  Neck: No JVD  Lungs: bilateral crackles bilaterally; No rales or wheezing  Heart: Regular rate and rhythm; No murmurs, rubs, or gallops  Abdomen: Soft, non tender, non distended   Extremities: Warm, no edema   Nervous system:  Alert & Oriented X3  Psychiatric: Normal affect  Skin: No rashes or lesions    LABS:  03-26    134[L]  |  98  |  70[H]  ----------------------------<  263[H]  4.3   |  26  |  8.50[H]    Ca    8.7      26 Mar 2025 07:10  Phos  5.8     03-26  Mg     2.8     03-26    TPro  7.1  /  Alb  3.2[L]  /  TBili  0.4  /  DBili  x   /  AST  15  /  ALT  20  /  AlkPhos  80  03-25    Creatinine Trend: 8.50<--, 5.83<--, 7.86<--, 7.05<--                        10.1   4.77  )-----------( 237      ( 26 Mar 2025 07:10 )             30.1     PT/INR - ( 25 Mar 2025 06:15 )   PT: 10.2 sec;   INR: 0.88 ratio         PTT - ( 25 Mar 2025 06:15 )  PTT:29.4 sec  Lipid Panel:   Cardiac Enzymes:         RADIOLOGY:   CXR   "IMPRESSION:  RIGHT perihilar and RIGHT middle lobe lung multifocal airspace   ill-defined opacities/infiltrates."    ECG :  < from: 12 Lead ECG (03.23.25 @ 16:55) >    Diagnosis Line Normal sinus rhythm HR 93  ST & T wave abnormality, consider lateral ischemia  Prolonged QT  Abnormal ECG    Confirmed by RINA LOPEZ, PK ([id number]) on 3/25/2025 10:17:56 AM    < end of copied text >       TELEMETRY: NSR HR 80s    ECHO: < from: TTE W or WO Ultrasound Enhancing Agent (03.24.25 @ 16:00) >  CONCLUSIONS:      1. Left ventricular cavity is normal in size. Left ventricular wall thickness is mildly increased. Left ventricular systolic function is normal with an ejection fraction visually estimated at 55 to 60 %. There are no regional wall motion abnormalities seen.   2. Basal septal hypertrophy (measures 1.2 cm) without evidence of left ventricular outflow tract obstruction.   3. There is moderate (grade 2) left ventricular diastolic dysfunction.   4. Normal right ventricular cavity size and normal right ventricular systolic function.   5. Trileaflet aortic valve. There is focal calcification of the aortic valve leaflets. No aortic valve stenosis.   6. Trace aortic regurgitation.   7. There is mild calcification of the mitral valve annulus.   8. Thickened anterior mitral valve leaflet, with oval shaped echodensity ~1.1cm x 0.51cm on the left ventricular aspect.   9. Trace mitral regurgitation.  10. Pulmonary artery systolic pressure could not be estimated.  11. Small right pleural effusion noted.  12. Trace pericardial effusion noted adjacent to the right atrium.  13. Findings were discussed with Dr Willis on 3/24/2025. No prior echocardiogram is available for comparison.    ____________________________________________________________________  Recommendations:  Recommend transesophageal echocardiogram.    < end of copied text >      < from: LINDA W or WO Ultrasound Enhancing Agent (03.26.25 @ 09:45) >  _______________________________________________________________________________________     CONCLUSIONS:      1. Left ventricular systolic function is normal with an ejection fraction visually estimated at 55 to 60 %.   2. Mild mitral regurgitation.   3. There is moderate calcification of the mitral valve annulus.   4. There is a small, linear 0.2 x 0.3 cm echodensity on the ventricular side of the anterior mitral valve leaflet. Differential includes torn chordae vs. sequelae of prior vegetation, among others. Clinical correlation is advised.   5. Catheter noted in the right atrium.   6. Trace aortic regurgitation.   7. Trace pulmonic regurgitation.   8. Trace pericardial effusion.   9. Mild atheroma in the visualized portions of the transverse aortic arch. Mild atheroma in the visualized portions of the descending aorta.  10. Compared to the transthoracic echocardiogram performed on 3/24/2025, a LINDA was performed and additional information provided. Findings were discussed with Dr. Chapa on 3/26/2025 at 1045.    < end of copied text >

## 2025-03-26 NOTE — DIETITIAN INITIAL EVALUATION ADULT - PROBLEM SELECTOR PROBLEM 4
[Dear  ___] : Dear  [unfilled], [Consult Letter:] : I had the pleasure of evaluating your patient, [unfilled]. [Please see my note below.] : Please see my note below. [Consult Closing:] : Thank you very much for allowing me to participate in the care of this patient.  If you have any questions, please do not hesitate to contact me. [Sincerely,] : Sincerely, [FreeTextEntry3] : Anuj Marquez MD [facility] Associated Professor, Julissa ESRD on dialysis

## 2025-03-26 NOTE — DIETITIAN INITIAL EVALUATION ADULT - PERTINENT LABORATORY DATA
03-26    134[L]  |  98  |  70[H]  ----------------------------<  263[H]  4.3   |  26  |  8.50[H]    Ca    8.7      26 Mar 2025 07:10  Phos  5.8     03-26  Mg     2.8     03-26    TPro  7.1  /  Alb  3.2[L]  /  TBili  0.4  /  DBili  x   /  AST  15  /  ALT  20  /  AlkPhos  80  03-25  POCT Blood Glucose.: 274 mg/dL (03-26-25 @ 07:55)  A1C with Estimated Average Glucose Result: 6.2 % (03-24-25 @ 05:20)  A1C with Estimated Average Glucose Result: 6.9 % (10-11-24 @ 06:35)  A1C with Estimated Average Glucose Result: 6.8 % (10-10-24 @ 05:00)

## 2025-03-26 NOTE — PROGRESS NOTE ADULT - ASSESSMENT
1. ESRD on HD (M/W/F)  -plan for HD today with UF 3.2kg and HD orders were written and discussed with dialysis nurse. Her edw 49.5kg.  -Hemodialysis Renal Diet and Fluid restriction to 1L/day  -Adjust meds to eGFR and avoid IV Gadolinium contrast,NSAIDs, and phosphate enema.  -Monitor Electrolytes daily.  2. Anemia of ESRD:  -order Epogen if hb <10  -F/u CBC daily  -transfuse if HB < 7.0.  3. HTN:   -bp is acceptable. nifedipine  -continue bp meds  -titrate bp meds to keep sbp >110 and < 130  4. Mineral Bone Disease:  -phos is okay  -continue renvela.   5. Sob possible combination of pneumonia with volume overload  -continue azithromycin and rocephin  -f/u cultures.   -s/p LINDA on 3/26 shows small, linear 0.2 x 0.3 cm echodensity on the ventricular side of the anterior mitral valve leaflet with concern for endocarditis. BC's 3/23 on admission negative.  -ID recs shows obtain serial BCs after completion of Ab Rx--q3d for 2 wks at either [address] [place] [address] or at HD center   Creatinine is 1,2  he needs to drink more fluids,  also lDL is high 110, needs to be under 70, will increase Crestor to 20 mg

## 2025-03-27 ENCOUNTER — TRANSCRIPTION ENCOUNTER (OUTPATIENT)
Age: 55
End: 2025-03-27

## 2025-03-27 ENCOUNTER — NON-APPOINTMENT (OUTPATIENT)
Age: 55
End: 2025-03-27

## 2025-03-27 VITALS
SYSTOLIC BLOOD PRESSURE: 132 MMHG | RESPIRATION RATE: 17 BRPM | OXYGEN SATURATION: 100 % | TEMPERATURE: 98 F | HEART RATE: 82 BPM | DIASTOLIC BLOOD PRESSURE: 75 MMHG

## 2025-03-27 DIAGNOSIS — R93.1 ABNORMAL FINDINGS ON DIAGNOSTIC IMAGING OF HEART AND CORONARY CIRCULATION: ICD-10-CM

## 2025-03-27 LAB
ANION GAP SERPL CALC-SCNC: 10 MMOL/L — SIGNIFICANT CHANGE UP (ref 5–17)
BUN SERPL-MCNC: 41 MG/DL — HIGH (ref 7–18)
CALCIUM SERPL-MCNC: 9.3 MG/DL — SIGNIFICANT CHANGE UP (ref 8.4–10.5)
CHLORIDE SERPL-SCNC: 99 MMOL/L — SIGNIFICANT CHANGE UP (ref 96–108)
CO2 SERPL-SCNC: 27 MMOL/L — SIGNIFICANT CHANGE UP (ref 22–31)
CREAT SERPL-MCNC: 5.32 MG/DL — HIGH (ref 0.5–1.3)
EGFR: 9 ML/MIN/1.73M2 — LOW
EGFR: 9 ML/MIN/1.73M2 — LOW
ERYTHROCYTE [SEDIMENTATION RATE] IN BLOOD: 44 MM/HR — HIGH (ref 0–20)
GLUCOSE BLDC GLUCOMTR-MCNC: 185 MG/DL — HIGH (ref 70–99)
GLUCOSE BLDC GLUCOMTR-MCNC: 325 MG/DL — HIGH (ref 70–99)
GLUCOSE SERPL-MCNC: 160 MG/DL — HIGH (ref 70–99)
HCT VFR BLD CALC: 32.7 % — LOW (ref 34.5–45)
HGB BLD-MCNC: 10.9 G/DL — LOW (ref 11.5–15.5)
MAGNESIUM SERPL-MCNC: 2.5 MG/DL — SIGNIFICANT CHANGE UP (ref 1.6–2.6)
MCHC RBC-ENTMCNC: 29.1 PG — SIGNIFICANT CHANGE UP (ref 27–34)
MCHC RBC-ENTMCNC: 33.3 G/DL — SIGNIFICANT CHANGE UP (ref 32–36)
MCV RBC AUTO: 87.4 FL — SIGNIFICANT CHANGE UP (ref 80–100)
NRBC BLD AUTO-RTO: 0 /100 WBCS — SIGNIFICANT CHANGE UP (ref 0–0)
PHOSPHATE SERPL-MCNC: 4.9 MG/DL — HIGH (ref 2.5–4.5)
PLATELET # BLD AUTO: 255 K/UL — SIGNIFICANT CHANGE UP (ref 150–400)
POTASSIUM SERPL-MCNC: 4.3 MMOL/L — SIGNIFICANT CHANGE UP (ref 3.5–5.3)
POTASSIUM SERPL-SCNC: 4.3 MMOL/L — SIGNIFICANT CHANGE UP (ref 3.5–5.3)
RBC # BLD: 3.74 M/UL — LOW (ref 3.8–5.2)
RBC # FLD: 12.8 % — SIGNIFICANT CHANGE UP (ref 10.3–14.5)
SODIUM SERPL-SCNC: 136 MMOL/L — SIGNIFICANT CHANGE UP (ref 135–145)
WBC # BLD: 6.15 K/UL — SIGNIFICANT CHANGE UP (ref 3.8–10.5)
WBC # FLD AUTO: 6.15 K/UL — SIGNIFICANT CHANGE UP (ref 3.8–10.5)

## 2025-03-27 PROCEDURE — 93312 ECHO TRANSESOPHAGEAL: CPT

## 2025-03-27 PROCEDURE — 82330 ASSAY OF CALCIUM: CPT

## 2025-03-27 PROCEDURE — 99239 HOSP IP/OBS DSCHRG MGMT >30: CPT

## 2025-03-27 PROCEDURE — 86703 HIV-1/HIV-2 1 RESULT ANTBDY: CPT

## 2025-03-27 PROCEDURE — 86803 HEPATITIS C AB TEST: CPT

## 2025-03-27 PROCEDURE — 87340 HEPATITIS B SURFACE AG IA: CPT

## 2025-03-27 PROCEDURE — 84132 ASSAY OF SERUM POTASSIUM: CPT

## 2025-03-27 PROCEDURE — 85652 RBC SED RATE AUTOMATED: CPT

## 2025-03-27 PROCEDURE — 99232 SBSQ HOSP IP/OBS MODERATE 35: CPT

## 2025-03-27 PROCEDURE — 93320 DOPPLER ECHO COMPLETE: CPT

## 2025-03-27 PROCEDURE — 86738 MYCOPLASMA ANTIBODY: CPT

## 2025-03-27 PROCEDURE — 36415 COLL VENOUS BLD VENIPUNCTURE: CPT

## 2025-03-27 PROCEDURE — 83036 HEMOGLOBIN GLYCOSYLATED A1C: CPT

## 2025-03-27 PROCEDURE — 84100 ASSAY OF PHOSPHORUS: CPT

## 2025-03-27 PROCEDURE — 82947 ASSAY GLUCOSE BLOOD QUANT: CPT

## 2025-03-27 PROCEDURE — 87449 NOS EACH ORGANISM AG IA: CPT

## 2025-03-27 PROCEDURE — 83880 ASSAY OF NATRIURETIC PEPTIDE: CPT

## 2025-03-27 PROCEDURE — 87641 MR-STAPH DNA AMP PROBE: CPT

## 2025-03-27 PROCEDURE — 84145 PROCALCITONIN (PCT): CPT

## 2025-03-27 PROCEDURE — 86850 RBC ANTIBODY SCREEN: CPT

## 2025-03-27 PROCEDURE — 86706 HEP B SURFACE ANTIBODY: CPT

## 2025-03-27 PROCEDURE — 96374 THER/PROPH/DIAG INJ IV PUSH: CPT

## 2025-03-27 PROCEDURE — 99261: CPT

## 2025-03-27 PROCEDURE — 87637 SARSCOV2&INF A&B&RSV AMP PRB: CPT

## 2025-03-27 PROCEDURE — 83735 ASSAY OF MAGNESIUM: CPT

## 2025-03-27 PROCEDURE — 85025 COMPLETE CBC W/AUTO DIFF WBC: CPT

## 2025-03-27 PROCEDURE — 80048 BASIC METABOLIC PNL TOTAL CA: CPT

## 2025-03-27 PROCEDURE — 80053 COMPREHEN METABOLIC PANEL: CPT

## 2025-03-27 PROCEDURE — 84484 ASSAY OF TROPONIN QUANT: CPT

## 2025-03-27 PROCEDURE — 85730 THROMBOPLASTIN TIME PARTIAL: CPT

## 2025-03-27 PROCEDURE — 82803 BLOOD GASES ANY COMBINATION: CPT

## 2025-03-27 PROCEDURE — 83605 ASSAY OF LACTIC ACID: CPT

## 2025-03-27 PROCEDURE — 99285 EMERGENCY DEPT VISIT HI MDM: CPT

## 2025-03-27 PROCEDURE — 84295 ASSAY OF SERUM SODIUM: CPT

## 2025-03-27 PROCEDURE — 85610 PROTHROMBIN TIME: CPT

## 2025-03-27 PROCEDURE — 93306 TTE W/DOPPLER COMPLETE: CPT

## 2025-03-27 PROCEDURE — 85027 COMPLETE CBC AUTOMATED: CPT

## 2025-03-27 PROCEDURE — 86900 BLOOD TYPING SEROLOGIC ABO: CPT

## 2025-03-27 PROCEDURE — 87899 AGENT NOS ASSAY W/OPTIC: CPT

## 2025-03-27 PROCEDURE — 87635 SARS-COV-2 COVID-19 AMP PRB: CPT

## 2025-03-27 PROCEDURE — 93005 ELECTROCARDIOGRAM TRACING: CPT

## 2025-03-27 PROCEDURE — 87640 STAPH A DNA AMP PROBE: CPT

## 2025-03-27 PROCEDURE — 71046 X-RAY EXAM CHEST 2 VIEWS: CPT

## 2025-03-27 PROCEDURE — 86901 BLOOD TYPING SEROLOGIC RH(D): CPT

## 2025-03-27 PROCEDURE — 84702 CHORIONIC GONADOTROPIN TEST: CPT

## 2025-03-27 PROCEDURE — 82962 GLUCOSE BLOOD TEST: CPT

## 2025-03-27 PROCEDURE — 93325 DOPPLER ECHO COLOR FLOW MAPG: CPT

## 2025-03-27 PROCEDURE — 87040 BLOOD CULTURE FOR BACTERIA: CPT

## 2025-03-27 RX ORDER — NIFEDIPINE 30 MG
1 TABLET, EXTENDED RELEASE 24 HR ORAL
Refills: 0 | DISCHARGE

## 2025-03-27 RX ORDER — NIFEDIPINE 30 MG
1 TABLET, EXTENDED RELEASE 24 HR ORAL
Qty: 30 | Refills: 0
Start: 2025-03-27

## 2025-03-27 RX ORDER — CEFPODOXIME PROXETIL 200 MG/1
1 TABLET, FILM COATED ORAL
Qty: 2 | Refills: 0
Start: 2025-03-27 | End: 2025-03-28

## 2025-03-27 RX ADMIN — INSULIN LISPRO 2: 100 INJECTION, SOLUTION INTRAVENOUS; SUBCUTANEOUS at 08:32

## 2025-03-27 RX ADMIN — Medication 90 MILLIGRAM(S): at 06:07

## 2025-03-27 RX ADMIN — GABAPENTIN 300 MILLIGRAM(S): 400 CAPSULE ORAL at 06:10

## 2025-03-27 RX ADMIN — HEPARIN SODIUM 5000 UNIT(S): 1000 INJECTION INTRAVENOUS; SUBCUTANEOUS at 06:06

## 2025-03-27 RX ADMIN — PREDNISONE 10 MILLIGRAM(S): 20 TABLET ORAL at 06:07

## 2025-03-27 RX ADMIN — SEVELAMER HYDROCHLORIDE 1600 MILLIGRAM(S): 800 TABLET ORAL at 06:07

## 2025-03-27 RX ADMIN — Medication 1 APPLICATION(S): at 06:08

## 2025-03-27 RX ADMIN — Medication 20 MILLIGRAM(S): at 12:18

## 2025-03-27 RX ADMIN — INSULIN LISPRO 8: 100 INJECTION, SOLUTION INTRAVENOUS; SUBCUTANEOUS at 12:17

## 2025-03-27 RX ADMIN — FUROSEMIDE 80 MILLIGRAM(S): 10 INJECTION INTRAMUSCULAR; INTRAVENOUS at 10:02

## 2025-03-27 RX ADMIN — METOPROLOL SUCCINATE 25 MILLIGRAM(S): 50 TABLET, EXTENDED RELEASE ORAL at 06:06

## 2025-03-27 NOTE — DISCHARGE NOTE PROVIDER - NSDCCPCAREPLAN_GEN_ALL_CORE_FT
PRINCIPAL DISCHARGE DIAGNOSIS  Diagnosis: Pneumonia  Assessment and Plan of Treatment: You were admitted to the hospital because you had pneumonia, which is an infection of the lungs. This caused you to feel short of breath. During the hospitalization, you were given two antibiotics, ceftriaxone and azithromycin, to help fight the infection. A chest X-ray showed areas of infection in the right side of your lungs.A blood test indicated there was a bacterial infection,.Your oxygen levels improved, and you no longer need supplemental oxygen. You are now breathing well on your own. Continue taking CEFPODOXIME 200 MG DAILY, an antibiotic, for 2 more days. You are being discharged with antibiotics. YOU MUST COMPLETE YOUR FULL COURSE OF ANTIBIOTICS, EVEN IF YOU FEEL BETTER. THIS IS BECAUSE SOME BACTERIA CAN STILL BE THERE, AND THE INFECTION CAN COME BACK. Complete the full course of antibiotics as prescribed. Please follow up with your primary care doctor on 4/1/2025.      SECONDARY DISCHARGE DIAGNOSES  Diagnosis: Abnormality of heart valve  Assessment and Plan of Treatment: An ultrasound of your heart (TTE) showed that your heart is pumping well, with a function between 55-60%. However, there is some thickening of the front part of your mitral valve, with a small, oval-shaped area of concern. A more detailed ultrasound (LINDA) confirmed a small area on your mitral valve, which might be due to a torn part of the valve or a past infection. There is no current evidence of an active infection, so you do not need antibiotics for endocarditis at this time. You will need follow-up blood tests every 3-4 days off antibiotics for the next 2-3 weeks to monitor for any signs of infection, as advised by the Infectious Disease team. Continue to see Dr. Chapa from the Cardiology team for ongoing heart care. Please follow up with your PCP on TUESDAY, APRIL 1, 2025.    Diagnosis: ESRD on dialysis  Assessment and Plan of Treatment: ou have history of ESRD on Hemodialysis MWF. You were dialyzed during your hospital stay and your BMP was closely monitored. Please continue following your established dialysis schedule and follow up with your PCP and Nephrologist in a week from discharge.    Diagnosis: Sarcoidosis  Assessment and Plan of Treatment: You have hisotory of sarcoidosis. Sarcoidosis is a condition where small clusters of inflammatory cells, called granulomas, can form in various organs, most commonly affecting the lungs. We focused on managing your symptoms and ensuring that your breathing and lung function were stable. You were evaluated by a pulmonologist and were continued on your HOME medication prednisone. Please continue to take your home medication as prescribed by your doctor. Please follow up with your PCP/pulmnologist within a week from discharge for further management.    Diagnosis: Hypertension  Assessment and Plan of Treatment: You have a history of Hypertension. On this admission, your Blood Pressure was adequately controlled with metoprolol and nifedpine. Check your blood pressure regularly at home, your blood pressure target is 120-140/80-90. If your BP is elevated over 180/110, please seek urgent medical attention. To care for your blood pressure at home maintain a healthy lifestyle, eat a low salt diet and added sugars, avoid fatty food, try to lose weight, and exercise regularly or stay active as tolerated 30 mins X 3 times per week. Notify your doctor if you have any of the following symptoms: (dizziness, lightheadedness, blurry vision, headache, chest pain, shortness of breath.) Please continue taking your home medications as prescribed and follow-up with your PCP in 1 week from discharge to adjust medications as needed.    Diagnosis: Diabetes  Assessment and Plan of Treatment: You have history of diabetes. Your HbA1c was 6.2 during this admission. You currently do not take any medications at home. Eat a diet that is low in added starches and sugars. Diabetes is associated with increased risk of many health conditions, including heart attacks, stroke, infections, kidney failure, and blindness. If you are physically able to, exercise at least 3 times a week. Please follow up with your PCP/endocrinologist for further recommednations.

## 2025-03-27 NOTE — DISCHARGE NOTE PROVIDER - CARE PROVIDER_API CALL
Teresa Chapa  Cardiology  [address] [facility]  [place] [address]  Phone: [phone]  Fax: [phone]  Follow Up Time: 2 weeks

## 2025-03-27 NOTE — DISCHARGE NOTE PROVIDER - HOSPITAL COURSE
54 F, from home, PMHx  sarcoidosis [on prednisone], DM, HTN, HLD, ESRD on HD [MWF s/p R permacath 09/2024], p/w worsening SOB x 1 day. In ED, afebrile, ,  /85, O2 95 on RA and RR 24. Labs remarkable for glu 214, proBNP 75k, and BUN/Cr 38/7.05. EKG showing lateral T wave inversions, new since prior. CXR R perihilar and middle lobe lung multifocal PNA. Patient was admitted for CAP, treated with rocephin x 5 days and azithro x 3 days. Procal elevated 0.67. BCx remained neg NGTD and atypical PNA work-up neg. Patient was weaned off O2, saturating well on RA.     Patient received HD while inpatient on scheduled regimen, was continued on sevelamer and PO lasix on nonHD days. BP was controlled with nifedipine and metoprolol. Patient was continued on prednisone for sarcoidosis. Hba1c 6.2. Patient was on ISS while continued on gabapentin for neuropathy and crestor for HLD.     Patient was on a transplant list for renal transplant. TTE LVEF 55-60%, M2DD, thickened anterior MV leaflet, with oval shaped echodensity ~1.1cm x 0.51cm on the left ventricular aspect. Cardiology reccomended LINDA for further eval which showed small, linear 0.2 x 0.3 cm echodensity on the ventricular side of the anterior mitral valve leaflet. Differential includes torn chordae vs. sequelae of prior vegetation. ID was consulted and recommended repeat serial BCx q3-4 days off Abx for 2-3 weeks, no indication for empiric Abx for endocarditis, patient did not meet [facility]'s criteria.     Given patient's improved clinical status and current hemodynamic stability, decision was made to discharge the patient. Patient is stable for discharge per attending and is advised to follow up with PCP as outpatient.   Patient is a 54yoF, from home, with a PMHx of sarcoidosis, on prednisone, DM, HTN, HLD, ESRD on HD [MWF s/p R permacath 09/2024], p/w worsening SOB x 1 day. In ED, afebrile, ,  /85, O2 95 on RA and RR 24. Labs remarkable for glu 214, proBNP 75k, and BUN/Cr 38/7.05. EKG showing lateral T wave inversions, new since prior. CXR R perihilar and middle lobe lung multifocal PNA. Patient was admitted for CAP, treated with rocephin x 5 days and azithro x 3 days. Procal elevated 0.67. BCx remained neg NGTD and atypical PNA work-up neg. Patient was weaned off O2, saturating well on RA.     Patient received HD while inpatient on scheduled regimen, was continued on sevelamer and PO lasix on nonHD days. BP was controlled with nifedipine and metoprolol. Patient was continued on prednisone for sarcoidosis. Hba1c 6.2. Patient was on ISS while continued on gabapentin for neuropathy and crestor for HLD.     Patient was on a transplant list for renal transplant. TTE LVEF 55-60%, M2DD, thickened anterior MV leaflet, with oval shaped echodensity ~1.1cm x 0.51cm on the left ventricular aspect. Cardiology reccomended LINDA for further eval which showed small, linear 0.2 x 0.3 cm echodensity on the ventricular side of the anterior mitral valve leaflet. Differential includes torn chordae vs. sequelae of prior vegetation. ID was consulted and recommended repeat serial BCx q3-4 days off Abx for 2-3 weeks, no indication for empiric Abx for endocarditis, patient did not meet Diagnostic criteria. Warm handoff given to medical director for the medicine clinic which the patient follows with.     Given patient's improved clinical status and current hemodynamic stability, decision was made to discharge the patient. Patient is stable for discharge per attending and is advised to follow up with PCP as outpatient.   none

## 2025-03-27 NOTE — DISCHARGE NOTE NURSING/CASE MANAGEMENT/SOCIAL WORK - FINANCIAL ASSISTANCE
[facility] provides services at a reduced cost to those who are determined to be eligible through [facility]’s financial assistance program. Information regarding [facility]’s financial assistance program can be found by going to [email or web address] or by calling [phone].

## 2025-03-27 NOTE — CHART NOTE - NSCHARTNOTEFT_GEN_A_CORE
Patient seen and examined.  No current complaints.  No acute events overnight.  PE is unchanged. Vitals reviewed. Labs reviewed.  Patient is a 54yoF with history of sarcoidosis, on prednisone, DMII, HTN, HLD, ESRD on HD who was admitted with PNA.   PNA - Started on Ceftriaxone - changed to cefpodoxime to finish course  Echodensity - seen on TTE and LINDA - Appreciated cardiology and ID recs. Plan for patient to have repeat BCx outpatient every 3-4 days for 2 weeks to ensure no bacteremia. Plan d/w outpatient physician who is aware of plan.  DM - although A1C is 6.2 patient with episodes of hyperglycemia here. Will not change home regimen given A1C. D/W outpatient physican.  HTN - Increased nifedipine. c/w toprol  Sarcoidosis - c/w prednisone  ESRD - HD per nephrology. c/w sevelamer  Case d/w Case management, social work and nursing on IDRs.  d/c home today  See d/c summary  D/C time: 40 minutes

## 2025-03-27 NOTE — PROGRESS NOTE ADULT - SUBJECTIVE AND OBJECTIVE BOX
PRESENTING CC: SOB    OVERNIGHT EVENTS: No new events overnight.  Reports improvement of cough.  Denies SOB, chest pain, palpitations    PMH:   PAST MEDICAL & SURGICAL HISTORY:  Sarcoidosis    Diabetes    Hypertension    Hyperlipidemia    Chronic kidney disease (CKD), stage III (moderate)    Seizure disorder    No significant past surgical history        Allergies    No Known Allergies    Intolerances        MEDICATIONS  (STANDING):  cefpodoxime 200 milliGRAM(s) Oral every 24 hours  chlorhexidine 2% Cloths 1 Application(s) Topical <User Schedule>  famotidine    Tablet 20 milliGRAM(s) Oral daily  ferrous    sulfate 325 milliGRAM(s) Oral daily  furosemide    Tablet 80 milliGRAM(s) Oral <User Schedule>  gabapentin 300 milliGRAM(s) Oral two times a day  heparin   Injectable 5000 Unit(s) SubCutaneous every 12 hours  insulin glargine Injectable (LANTUS) 7 Unit(s) SubCutaneous at bedtime  insulin lispro (ADMELOG) corrective regimen sliding scale   SubCutaneous three times a day before meals  insulin lispro (ADMELOG) corrective regimen sliding scale   SubCutaneous at bedtime  metoprolol succinate ER 25 milliGRAM(s) Oral daily  mupirocin 2% Ointment 1 Application(s) Both Nostrils two times a day  NIFEdipine XL 90 milliGRAM(s) Oral daily  predniSONE   Tablet 10 milliGRAM(s) Oral daily  rosuvastatin 20 milliGRAM(s) Oral at bedtime  sevelamer carbonate 1600 milliGRAM(s) Oral three times a day    MEDICATIONS  (PRN):  acetaminophen     Tablet .. 650 milliGRAM(s) Oral every 6 hours PRN Mild Pain (1 - 3)  aluminum hydroxide/magnesium hydroxide/simethicone Suspension 30 milliLiter(s) Oral every 4 hours PRN Dyspepsia  guaiFENesin Oral Liquid (Sugar-Free) 100 milliGRAM(s) Oral every 6 hours PRN Cough  melatonin 3 milliGRAM(s) Oral at bedtime PRN Insomnia  ondansetron Injectable 4 milliGRAM(s) IV Push every 8 hours PRN Nausea and/or Vomiting      FAMILY HISTORY:  Family history of diabetes mellitus in first degree relative      Reviewed; no change from my prior note    SOCIAL HISTORY:  Reviewed, no change from my prior note    REVIEW OF SYSTEMS:  Constitutional: [ ] fever, [ ]weight loss,  [ ]fatigue  Eyes: [ ] visual changes  Respiratory: [ ]shortness of breath;  [ ] cough, [ ]wheezing, [ ]chills, [ ]hemoptysis  Cardiovascular: [ ] chest pain, [ ]palpitations, [ ]dizziness,  [ ]leg swelling [ ]syncope  Gastrointestinal: [ ] abdominal pain, [ ]nausea, [ ]vomiting,  [ ]diarrhea   Genitourinary: [ ] dysuria, [ ] hematuria  Neurologic: [ ] headaches [ ] tremors [ ] weakness [ ] lightheadedness  Skin: [ ] itching, [ ]burning, [ ] rashes  Endocrine: [ ] heat or cold intolerance  Musculoskeletal: [ ] joint pain or swelling; [ ] muscle, back, or extremity pain  Psychiatric: [ ] depression, [ ]anxiety, [ ]mood swings, or [ ]difficulty sleeping  Hematologic: [ ] easy bruising, [ ] bleeding gums    [x] All remaining systems negative except as per above.   [  ] Unable to obtain    Vital Signs Last 24 Hrs  T(C): 36.6 (27 Mar 2025 11:08), Max: 36.9 (27 Mar 2025 08:32)  T(F): 97.9 (27 Mar 2025 11:08), Max: 98.4 (27 Mar 2025 08:32)  HR: 82 (27 Mar 2025 11:08) (75 - 88)  BP: 132/75 (27 Mar 2025 11:08) (108/68 - 164/83)  BP(mean): --  RR: 17 (27 Mar 2025 11:08) (16 - 18)  SpO2: 100% (27 Mar 2025 11:08) (97% - 100%)    Parameters below as of 27 Mar 2025 11:08  Patient On (Oxygen Delivery Method): room air      I&O's Summary    26 Mar 2025 07:01  -  27 Mar 2025 07:00  --------------------------------------------------------  IN: 0 mL / OUT: 3000 mL / NET: -3000 mL        PHYSICAL EXAM:  General: No acute distress  HEENT: EOMI  Neck: No JVD  Lungs: Clear to auscultation bilaterally; No rales or wheezing  Heart: Regular rate and rhythm; No murmurs, rubs, or gallops  Abdomen: Soft, non tender, non distended   Extremities: Warm, no edema   Nervous system:  Alert & Oriented X3  Psychiatric: Normal affect  Skin: No rashes or lesions    LABS:  03-27    136  |  99  |  41[H]  ----------------------------<  160[H]  4.3   |  27  |  5.32[H]    Ca    9.3      27 Mar 2025 06:02  Phos  4.9     03-27  Mg     2.5     03-27      Creatinine Trend: 5.32<--, 8.50<--, 5.83<--, 7.86<--, 7.05<--                        10.9   6.15  )-----------( 255      ( 27 Mar 2025 06:02 )             32.7       Lipid Panel:   Cardiac Enzymes:           RADIOLOGY:   CXR   "IMPRESSION:  RIGHT perihilar and RIGHT middle lobe lung multifocal airspace   ill-defined opacities/infiltrates."    ECG :  < from: 12 Lead ECG (03.23.25 @ 16:55) >    Diagnosis Line Normal sinus rhythm HR 93  ST & T wave abnormality, consider lateral ischemia  Prolonged QT  Abnormal ECG    Confirmed by PK FLYNN MD ([id number]) on 3/25/2025 10:17:56 AM    < end of copied text >       TELEMETRY: NSR HR 80s    ECHO: < from: TTE W or WO Ultrasound Enhancing Agent (03.24.25 @ 16:00) >  CONCLUSIONS:      1. Left ventricular cavity is normal in size. Left ventricular wall thickness is mildly increased. Left ventricular systolic function is normal with an ejection fraction visually estimated at 55 to 60 %. There are no regional wall motion abnormalities seen.   2. Basal septal hypertrophy (measures 1.2 cm) without evidence of left ventricular outflow tract obstruction.   3. There is moderate (grade 2) left ventricular diastolic dysfunction.   4. Normal right ventricular cavity size and normal right ventricular systolic function.   5. Trileaflet aortic valve. There is focal calcification of the aortic valve leaflets. No aortic valve stenosis.   6. Trace aortic regurgitation.   7. There is mild calcification of the mitral valve annulus.   8. Thickened anterior mitral valve leaflet, with oval shaped echodensity ~1.1cm x 0.51cm on the left ventricular aspect.   9. Trace mitral regurgitation.  10. Pulmonary artery systolic pressure could not be estimated.  11. Small right pleural effusion noted.  12. Trace pericardial effusion noted adjacent to the right atrium.  13. Findings were discussed with Dr Willis on 3/24/2025. No prior echocardiogram is available for comparison.    ____________________________________________________________________  Recommendations:  Recommend transesophageal echocardiogram.    < end of copied text >      < from: LINDA W or WO Ultrasound Enhancing Agent (03.26.25 @ 09:45) >  _______________________________________________________________________________________     CONCLUSIONS:      1. Left ventricular systolic function is normal with an ejection fraction visually estimated at 55 to 60 %.   2. Mild mitral regurgitation.   3. There is moderate calcification of the mitral valve annulus.   4. There is a small, linear 0.2 x 0.3 cm echodensity on the ventricular side of the anterior mitral valve leaflet. Differential includes torn chordae vs. sequelae of prior vegetation, among others. Clinical correlation is advised.   5. Catheter noted in the right atrium.   6. Trace aortic regurgitation.   7. Trace pulmonic regurgitation.   8. Trace pericardial effusion.   9. Mild atheroma in the visualized portions of the transverse aortic arch. Mild atheroma in the visualized portions of the descending aorta.  10. Compared to the transthoracic echocardiogram performed on 3/24/2025, a LINDA was performed and additional information provided. Findings were discussed with Dr. Chapa on 3/26/2025 at 1045.    < end of copied text >

## 2025-03-27 NOTE — DISCHARGE NOTE PROVIDER - NSDCMRMEDTOKEN_GEN_ALL_CORE_FT
Crestor 20 mg oral tablet: 1 tab(s) orally once a day  ergocalciferol 50 mcg (2000 intl units) oral capsule: 1 cap(s) orally every 7 days  famotidine 20 mg oral tablet: 1 tab(s) orally once a day  ferrous sulfate 325 mg (65 mg elemental iron) oral tablet: 1 tab(s) orally once a day  gabapentin 300 mg oral capsule: 1 cap(s) orally 2 times a day  Lasix 80 mg oral tablet: 1 tab(s) orally 3 to 4 times a week Please take this medication on the days you are not going for dialysis.  metoprolol succinate 25 mg oral tablet, extended release: 1 tab(s) orally once a day  predniSONE 10 mg oral tablet: 1 tab(s) orally once a day  sevelamer carbonate 800 mg oral tablet: 2 tab(s) orally 3 times a day   cefpodoxime 200 mg oral tablet: 1 tab(s) orally every 24 hours  Crestor 20 mg oral tablet: 1 tab(s) orally once a day  ergocalciferol 50 mcg (2000 intl units) oral capsule: 1 cap(s) orally every 7 days  famotidine 20 mg oral tablet: 1 tab(s) orally once a day  ferrous sulfate 325 mg (65 mg elemental iron) oral tablet: 1 tab(s) orally once a day  gabapentin 300 mg oral capsule: 1 cap(s) orally 2 times a day  Lasix 80 mg oral tablet: 1 tab(s) orally 3 to 4 times a week Please take this medication on the days you are not going for dialysis.  metoprolol succinate 25 mg oral tablet, extended release: 1 tab(s) orally once a day  NIFEdipine (Eqv-Adalat CC) 90 mg oral tablet, extended release: 1 tab(s) orally once a day  predniSONE 10 mg oral tablet: 1 tab(s) orally once a day  sevelamer carbonate 800 mg oral tablet: 2 tab(s) orally 3 times a day

## 2025-03-27 NOTE — DISCHARGE NOTE NURSING/CASE MANAGEMENT/SOCIAL WORK - PATIENT PORTAL LINK FT
You can access the FollowMyHealth Patient Portal offered by [facility] by registering at the following website: [email or web address]. By joining [employer]’s FollowMyHealth portal, you will also be able to view your health information using other applications (apps) compatible with our system.

## 2025-03-27 NOTE — PROGRESS NOTE ADULT - PROVIDER SPECIALTY LIST ADULT
Cardiology
Internal Medicine
Nephrology
Cardiology
Internal Medicine
Nephrology
Internal Medicine

## 2025-03-27 NOTE — PROGRESS NOTE ADULT - NS ATTEND AMEND GEN_ALL_CORE FT
Vital noted  labs noted  No events on tele  LINDA images reviewed     54F w sarcoid, DM, HLD, ESRD on HD, CAD (coronary calcium on CT chest) presented with 1 day of sob found to have PNA on CXR, cardiology c/s for abn ecg. Pt never had cp and TTE w no WMA. Pt has been treated for PNA. Her CV exam is wnl; no more rhonchi. Is euvolemic on exam. TTE with possible vegetation, ID recs noted.   Pt instructed to f/u w cardiologist upon d/c given sarcoid and need to monitor for cardiac involvement and also to manage CAD.
Agree with above  For LINDA to eval for MV leaflet mass. D/w pt risks/benefits/alternatives, agrees to proceed.
Discussed LINDA findings w pt. Pt euvolemic on exam.   Recommend ID consult for guidance on LINDA findings.

## 2025-03-27 NOTE — DISCHARGE NOTE PROVIDER - NSDCFUSCHEDAPPT_GEN_ALL_CORE_FT
DEEJAY RAMIREZ  [facility]  CARDIOLOGY [address] [facility]   Scheduled Appointment: 04/03/2025    [facility]  INTMED  [facility]   Scheduled Appointment: 04/17/2025    Valentin Leslie  [facility]  PULMMED [address] [facility] [place]  Scheduled Appointment: 04/23/2025    [facility]  PSYCHIATRY [address] [facility]   Scheduled Appointment: 06/18/2025     [facility]  INTMED  [facility]   Scheduled Appointment: 04/03/2025    Tosin Madsen  [facility]  [address]  Scheduled Appointment: 04/04/2025    DEEJAY RAMIREZ  [facility]  CARDIOLOGY [address] [facility]   Scheduled Appointment: 04/04/2025    [facility]  INTMED  [facility]   Scheduled Appointment: 04/17/2025    Valentin Leslie  [facility]  PULMMED [address] [place] [address]  Scheduled Appointment: 04/23/2025    [facility]  PSYCHIATRY [address] [facility]   Scheduled Appointment: 06/18/2025

## 2025-03-27 NOTE — PROGRESS NOTE ADULT - ASSESSMENT
54F w sarcoid, DM, HLD, ESRD on HD, CAD (coronary calcium on CT chest) presents with 1 day of sob found to have PNA on CXR, cardiology c/s for abn ecg     #Abnormal ECG - ECG shows NSR with lateral TWI. Pt denies cp. Of note, prior ECGs reviewed and pt had TWI back in March 2024. Pt has active PNA on imaging and on exam has crackles. She feels improved since Abx have been started. Has no cp.   - TTE with EF 55-60%. No wall motion abnormalities. Thickened anterior mitral valve leaflet, with oval shaped echodensity ~1.1cm x 0.51cm on the left ventricular aspect.  -s/p LINDA today-There is a small, linear 0.2 x 0.3 cm echodensity on the ventricular side of the anterior mitral valve leaflet. Differential includes torn chordae vs. sequelae of prior vegetation, among others. Clinical correlation is advised.  - ID recs appreciated  - PNA mgmt per primary team   - pt needs outpt cardiology f/u upon d/c - may need cMRI given sarcoid hx if no contraindications. Please ensure pt has appt scheduled prior to d/c.     #CAD - has coronary calcium on CT chest.  - continue rosuvastatin    54F w sarcoid, DM, HLD, ESRD on HD, CAD (coronary calcium on CT chest) presents with 1 day of sob found to have PNA on CXR, cardiology c/s for abn ecg     #Abnormal ECG - ECG shows NSR with lateral TWI. Pt denies cp. Of note, prior ECGs reviewed and pt had TWI back in March 2024. Pt has active PNA on imaging and on exam has crackles. She feels improved since Abx have been started. Has no cp.   - TTE with EF 55-60%. No wall motion abnormalities. Thickened anterior mitral valve leaflet, with oval shaped echodensity ~1.1cm x 0.51cm on the left ventricular aspect.  -s/p LINDA -There is a small, linear 0.2 x 0.3 cm echodensity on the ventricular side of the anterior mitral valve leaflet. Differential includes torn chordae vs. sequelae of prior vegetation, among others. Clinical correlation is advised.  - ID recs appreciated  - PNA mgmt per primary team   - pt needs outpt cardiology f/u upon d/c - may need cMRI given sarcoid hx if no contraindications. Please ensure pt has appt scheduled prior to d/c.     #CAD - has coronary calcium on CT chest.  - continue rosuvastatin

## 2025-04-03 ENCOUNTER — APPOINTMENT (OUTPATIENT)
Dept: CARDIOLOGY | Facility: CLINIC | Age: 55
End: 2025-04-03

## 2025-04-03 ENCOUNTER — APPOINTMENT (OUTPATIENT)
Dept: INTERNAL MEDICINE | Facility: CLINIC | Age: 55
End: 2025-04-03

## 2025-04-04 ENCOUNTER — OUTPATIENT (OUTPATIENT)
Dept: OUTPATIENT SERVICES | Facility: HOSPITAL | Age: 55
LOS: 1 days | End: 2025-04-04

## 2025-04-04 ENCOUNTER — APPOINTMENT (OUTPATIENT)
Dept: INTERNAL MEDICINE | Facility: CLINIC | Age: 55
End: 2025-04-04
Payer: MEDICARE

## 2025-04-04 ENCOUNTER — APPOINTMENT (OUTPATIENT)
Dept: CARDIOLOGY | Facility: CLINIC | Age: 55
End: 2025-04-04

## 2025-04-04 ENCOUNTER — NON-APPOINTMENT (OUTPATIENT)
Age: 55
End: 2025-04-04

## 2025-04-04 VITALS
WEIGHT: 110 LBS | DIASTOLIC BLOOD PRESSURE: 86 MMHG | SYSTOLIC BLOOD PRESSURE: 166 MMHG | BODY MASS INDEX: 21.6 KG/M2 | HEIGHT: 60 IN | HEART RATE: 70 BPM

## 2025-04-04 VITALS
SYSTOLIC BLOOD PRESSURE: 152 MMHG | OXYGEN SATURATION: 97 % | HEART RATE: 76 BPM | BODY MASS INDEX: 21.6 KG/M2 | HEIGHT: 60 IN | DIASTOLIC BLOOD PRESSURE: 74 MMHG | WEIGHT: 110 LBS

## 2025-04-04 VITALS — DIASTOLIC BLOOD PRESSURE: 82 MMHG | SYSTOLIC BLOOD PRESSURE: 142 MMHG

## 2025-04-04 DIAGNOSIS — D86.9 SARCOIDOSIS, UNSPECIFIED: ICD-10-CM

## 2025-04-04 DIAGNOSIS — N18.6 END STAGE RENAL DISEASE: ICD-10-CM

## 2025-04-04 DIAGNOSIS — E11.9 TYPE 2 DIABETES MELLITUS W/OUT COMPLICATIONS: ICD-10-CM

## 2025-04-04 DIAGNOSIS — J18.9 PNEUMONIA, UNSPECIFIED ORGANISM: ICD-10-CM

## 2025-04-04 DIAGNOSIS — I10 ESSENTIAL (PRIMARY) HYPERTENSION: ICD-10-CM

## 2025-04-04 DIAGNOSIS — D50.9 IRON DEFICIENCY ANEMIA, UNSPECIFIED: ICD-10-CM

## 2025-04-04 DIAGNOSIS — R93.1 ABNORMAL FINDINGS ON DIAGNOSTIC IMAGING OF HEART AND CORONARY CIRCULATION: ICD-10-CM

## 2025-04-04 DIAGNOSIS — Z99.2 DEPENDENCE ON RENAL DIALYSIS: ICD-10-CM

## 2025-04-04 DIAGNOSIS — Z01.818 ENCOUNTER FOR OTHER PREPROCEDURAL EXAMINATION: ICD-10-CM

## 2025-04-04 PROCEDURE — 99215 OFFICE O/P EST HI 40 MIN: CPT

## 2025-04-04 PROCEDURE — 99495 TRANSJ CARE MGMT MOD F2F 14D: CPT

## 2025-04-04 PROCEDURE — G2211 COMPLEX E/M VISIT ADD ON: CPT

## 2025-04-04 PROCEDURE — 93000 ELECTROCARDIOGRAM COMPLETE: CPT | Mod: NC

## 2025-04-07 PROBLEM — J18.9 PNEUMONIA: Status: ACTIVE | Noted: 2025-04-07

## 2025-04-14 NOTE — CONSULT NOTE ADULT - PROBLEM SELECTOR RECOMMENDATION 9
Diabetes Education and Nutrition Eval  Insulin gtt requirements remain high and higher than her typical in-patient doses. Expect her insulin requirements to come down considerably over the next 24 hours. To transition off the insulin gtt recommend NPH 15 units x 1 now and then start Lantus 30 units qhs. If eating can start Humalog 8 units with meals with moderate correction scale with meals and low bedtime scale.  Goal glucose 100-180  Outpt. endo follow-up  Outpt. optho, podiatry, nephrology  Plan to d/c on basal bolus English

## 2025-04-16 DIAGNOSIS — D86.9 SARCOIDOSIS, UNSPECIFIED: ICD-10-CM

## 2025-04-16 DIAGNOSIS — N18.6 END STAGE RENAL DISEASE: ICD-10-CM

## 2025-04-16 DIAGNOSIS — J18.9 PNEUMONIA, UNSPECIFIED ORGANISM: ICD-10-CM

## 2025-04-16 DIAGNOSIS — E11.9 TYPE 2 DIABETES MELLITUS WITHOUT COMPLICATIONS: ICD-10-CM

## 2025-04-16 DIAGNOSIS — Z99.2 DEPENDENCE ON RENAL DIALYSIS: ICD-10-CM

## 2025-04-16 DIAGNOSIS — D50.9 IRON DEFICIENCY ANEMIA, UNSPECIFIED: ICD-10-CM

## 2025-04-16 DIAGNOSIS — R93.1 ABNORMAL FINDINGS ON DIAGNOSTIC IMAGING OF HEART AND CORONARY CIRCULATION: ICD-10-CM

## 2025-04-17 ENCOUNTER — APPOINTMENT (OUTPATIENT)
Dept: INTERNAL MEDICINE | Facility: CLINIC | Age: 55
End: 2025-04-17

## 2025-04-23 ENCOUNTER — APPOINTMENT (OUTPATIENT)
Dept: PULMONOLOGY | Facility: CLINIC | Age: 55
End: 2025-04-23

## 2025-04-24 ENCOUNTER — APPOINTMENT (OUTPATIENT)
Dept: GASTROENTEROLOGY | Facility: CLINIC | Age: 55
End: 2025-04-24

## 2025-04-29 ENCOUNTER — APPOINTMENT (OUTPATIENT)
Dept: CARDIOLOGY | Facility: CLINIC | Age: 55
End: 2025-04-29

## 2025-05-01 ENCOUNTER — APPOINTMENT (OUTPATIENT)
Dept: VASCULAR SURGERY | Facility: CLINIC | Age: 55
End: 2025-05-01
Payer: MEDICARE

## 2025-05-01 PROCEDURE — 93986 DUP-SCAN HEMO COMPL UNI STD: CPT

## 2025-05-01 PROCEDURE — 99204 OFFICE O/P NEW MOD 45 MIN: CPT

## 2025-05-06 ENCOUNTER — APPOINTMENT (OUTPATIENT)
Dept: OBGYN | Facility: CLINIC | Age: 55
End: 2025-05-06

## 2025-05-15 ENCOUNTER — APPOINTMENT (OUTPATIENT)
Dept: MAMMOGRAPHY | Facility: IMAGING CENTER | Age: 55
End: 2025-05-15

## 2025-05-15 ENCOUNTER — APPOINTMENT (OUTPATIENT)
Dept: ULTRASOUND IMAGING | Facility: IMAGING CENTER | Age: 55
End: 2025-05-15

## 2025-05-15 ENCOUNTER — OUTPATIENT (OUTPATIENT)
Dept: OUTPATIENT SERVICES | Facility: HOSPITAL | Age: 55
LOS: 1 days | End: 2025-05-15

## 2025-05-15 DIAGNOSIS — Z01.419 ENCOUNTER FOR GYNECOLOGICAL EXAMINATION (GENERAL) (ROUTINE) WITHOUT ABNORMAL FINDINGS: ICD-10-CM

## 2025-05-15 DIAGNOSIS — R92.30 DENSE BREASTS, UNSPECIFIED: ICD-10-CM

## 2025-05-15 DIAGNOSIS — Z00.8 ENCOUNTER FOR OTHER GENERAL EXAMINATION: ICD-10-CM

## 2025-05-22 ENCOUNTER — APPOINTMENT (OUTPATIENT)
Dept: CARDIOLOGY | Facility: CLINIC | Age: 55
End: 2025-05-22

## 2025-06-02 NOTE — H&P ADULT - PROBLEM/PLAN-1
Patient: Saskia Koehler    Procedure Summary       Date: 06/02/25 Room / Location: [facility] OR 04 / Virtual [facility]    Anesthesia Start: 0920 Anesthesia Stop: 1129    Procedure: Vitrectomy, SOR , MEMBRANE PEEL. endolaser (Right: Eye) Diagnosis:       Posterior dislocation of right lens      Silicone oil in eye after vitrectomy      Proliferative vitreoretinopathy, right      Vitreous hemorrhage of right eye (Multi)      (Posterior dislocation of right lens [H27.131])      (Silicone oil in eye after vitrectomy [Z98.890])    Surgeons: Umer Hidalgo MD PhD Responsible Provider: Odalis Persaud DO    Anesthesia Type: MAC ASA Status: 2 - Emergent            Anesthesia Type: MAC    Vitals Value Taken Time   /65 06/02/25 11:40   Temp 36 °C (96.8 °F) 06/02/25 11:40   Pulse 69 06/02/25 11:40   Resp 16 06/02/25 11:40   SpO2 98 % 06/02/25 11:40       Anesthesia Post Evaluation    Patient location during evaluation: PACU  Patient participation: complete - patient participated  Level of consciousness: awake  Pain management: satisfactory to patient  Multimodal analgesia pain management approach  Airway patency: patent  Cardiovascular status: acceptable  Respiratory status: acceptable  Hydration status: acceptable  Postoperative Nausea and Vomiting: none        There were no known notable events for this encounter.    
DISPLAY PLAN FREE TEXT

## 2025-06-03 NOTE — PROGRESS NOTE ADULT - PROBLEM SELECTOR PLAN 4
Per Dr Singleton:  - Patient denies any personal or family history of calcium disorders. No hx neck surgery, diuretic use, or the use of bisphosphonates or any other medications for low bone density.   - Hypocalcemia likely multifactorial i/s/o ESRD w/ elevated phos, severe vitamin D deficiency, and possible hypoparathyroidism (versus low PTH i/s/o electrolyte abnormalities)  -Repeat PTH elevated, more consistent with secondary hyperparathyroidism in the setting of ESRD and severe vitamin D deficiency   c/w calcium acetate as ordered  c/w HD as per nephro Awake/Alert/Cooperative

## 2025-06-18 ENCOUNTER — APPOINTMENT (OUTPATIENT)
Dept: PSYCHIATRY | Facility: CLINIC | Age: 55
End: 2025-06-18

## 2025-06-18 ENCOUNTER — NON-APPOINTMENT (OUTPATIENT)
Age: 55
End: 2025-06-18

## 2025-07-11 NOTE — PROGRESS NOTE ADULT - PROBLEM SELECTOR PROBLEM 1
Anticoagulation: Return Visit    Pt here for routine f/u. Pt is on warfarin for Afib and MVR, with INR goal of 2.5 - 3.5.    Current warfarin regimen: 4mg Mon/Wed/Fri and 3mg daily ROW                                            Patient taking 4mg Mon/Fri and 3mg ROW    Assessment  (+) missed or extra doses: Patient accidentally took a lower dose of warfarin since her last visit.  (-) change in medications  (-) change in vitamin K intake  (-) EtOH use  (-) signs/sx of VTE or stroke  (-) new bleeding/bruising  (-) recent falls  (+) upcoming surgeries/procedures: Patient reports she will have teeth pulled, but no appointment has been scheduled. Patient to alert clinic when scheduled.    Plan   Pt counseled to seek emergent care in case of excessive bleeding/bruising, or if sx of VTE/stroke occur. Patient's INR = 2.9 today, which is within the desired therapeutic range of 2.5 - 3.5. Plan is to continue with the current warfarin regimen. RTC in 3 weeks (7/31/25).    Patient misunderstood the directions to take 4mg on Mon/Wed/Fri and 4mg daily all other days. Patient took 4mg on Mon and Fri only and took 3mg the rest of the week. Plan to continue with patient's dose of 4mg on Mon/Fri and 3mg daily ROW.    Given Rx this visit: warfarin 3mg #90 w/0 refills and                                 Warfarin 4mg #90 w/0 refills sent to [facility]    15 minutes were spent with the patient in direct face-to-face contact reviewing PT/INR results and anticoagulation management    Dyspnea

## 2025-07-22 ENCOUNTER — APPOINTMENT (OUTPATIENT)
Dept: NEUROLOGY | Facility: CLINIC | Age: 55
End: 2025-07-22

## 2025-07-22 ENCOUNTER — NON-APPOINTMENT (OUTPATIENT)
Age: 55
End: 2025-07-22

## 2025-07-24 ENCOUNTER — APPOINTMENT (OUTPATIENT)
Dept: CARDIOLOGY | Facility: CLINIC | Age: 55
End: 2025-07-24

## 2025-07-29 DIAGNOSIS — Z12.11 ENCOUNTER FOR SCREENING FOR MALIGNANT NEOPLASM OF COLON: ICD-10-CM

## 2025-07-30 ENCOUNTER — APPOINTMENT (OUTPATIENT)
Facility: CLINIC | Age: 55
End: 2025-07-30

## 2025-08-06 ENCOUNTER — APPOINTMENT (OUTPATIENT)
Dept: VASCULAR SURGERY | Facility: HOSPITAL | Age: 55
End: 2025-08-06

## 2025-08-19 ENCOUNTER — APPOINTMENT (OUTPATIENT)
Dept: VASCULAR SURGERY | Facility: CLINIC | Age: 55
End: 2025-08-19

## 2025-09-18 ENCOUNTER — NON-APPOINTMENT (OUTPATIENT)
Age: 55
End: 2025-09-18